# Patient Record
Sex: MALE | Race: WHITE | NOT HISPANIC OR LATINO | Employment: OTHER | URBAN - METROPOLITAN AREA
[De-identification: names, ages, dates, MRNs, and addresses within clinical notes are randomized per-mention and may not be internally consistent; named-entity substitution may affect disease eponyms.]

---

## 2017-01-05 ENCOUNTER — TRANSCRIBE ORDERS (OUTPATIENT)
Dept: ADMINISTRATIVE | Facility: HOSPITAL | Age: 62
End: 2017-01-05

## 2017-01-05 ENCOUNTER — ALLSCRIPTS OFFICE VISIT (OUTPATIENT)
Dept: OTHER | Facility: OTHER | Age: 62
End: 2017-01-05

## 2017-01-05 DIAGNOSIS — J44.9 OBSTRUCTIVE CHRONIC BRONCHITIS WITHOUT EXACERBATION (HCC): Primary | ICD-10-CM

## 2017-01-17 ENCOUNTER — ALLSCRIPTS OFFICE VISIT (OUTPATIENT)
Dept: OTHER | Facility: OTHER | Age: 62
End: 2017-01-17

## 2017-02-01 ENCOUNTER — ALLSCRIPTS OFFICE VISIT (OUTPATIENT)
Dept: OTHER | Facility: OTHER | Age: 62
End: 2017-02-01

## 2017-02-02 ENCOUNTER — ALLSCRIPTS OFFICE VISIT (OUTPATIENT)
Dept: OTHER | Facility: OTHER | Age: 62
End: 2017-02-02

## 2017-02-02 DIAGNOSIS — R73.01 IMPAIRED FASTING GLUCOSE: ICD-10-CM

## 2017-02-02 DIAGNOSIS — Z12.5 ENCOUNTER FOR SCREENING FOR MALIGNANT NEOPLASM OF PROSTATE: ICD-10-CM

## 2017-02-02 DIAGNOSIS — J44.1 CHRONIC OBSTRUCTIVE PULMONARY DISEASE WITH ACUTE EXACERBATION (HCC): ICD-10-CM

## 2017-02-02 DIAGNOSIS — E55.9 VITAMIN D DEFICIENCY: ICD-10-CM

## 2017-02-02 DIAGNOSIS — M81.0 AGE-RELATED OSTEOPOROSIS WITHOUT CURRENT PATHOLOGICAL FRACTURE: ICD-10-CM

## 2017-03-08 ENCOUNTER — HOSPITAL ENCOUNTER (OUTPATIENT)
Dept: RADIOLOGY | Facility: HOSPITAL | Age: 62
Discharge: HOME | End: 2017-03-08
Attending: INTERNAL MEDICINE
Payer: COMMERCIAL

## 2017-03-08 DIAGNOSIS — J44.9 OBSTRUCTIVE CHRONIC BRONCHITIS WITHOUT EXACERBATION (HCC): ICD-10-CM

## 2017-03-08 DIAGNOSIS — J44.9 CHRONIC OBSTRUCTIVE PULMONARY DISEASE (HCC): ICD-10-CM

## 2017-05-03 ENCOUNTER — ALLSCRIPTS OFFICE VISIT (OUTPATIENT)
Dept: OTHER | Facility: OTHER | Age: 62
End: 2017-05-03

## 2017-08-15 ENCOUNTER — ALLSCRIPTS OFFICE VISIT (OUTPATIENT)
Dept: OTHER | Facility: OTHER | Age: 62
End: 2017-08-15

## 2017-08-15 ENCOUNTER — TRANSCRIBE ORDERS (OUTPATIENT)
Dept: ADMINISTRATIVE | Facility: HOSPITAL | Age: 62
End: 2017-08-15

## 2017-08-15 DIAGNOSIS — I42.0 CONGESTIVE CARDIOMYOPATHY (HCC): Primary | ICD-10-CM

## 2017-08-15 DIAGNOSIS — I50.32 CHRONIC DIASTOLIC HEART FAILURE (HCC): ICD-10-CM

## 2017-08-21 ENCOUNTER — APPOINTMENT (OUTPATIENT)
Dept: LAB | Facility: HOSPITAL | Age: 62
End: 2017-08-21
Payer: COMMERCIAL

## 2017-08-21 ENCOUNTER — TRANSCRIBE ORDERS (OUTPATIENT)
Dept: LAB | Facility: HOSPITAL | Age: 62
End: 2017-08-21

## 2017-08-21 DIAGNOSIS — E78.00 PURE HYPERCHOLESTEROLEMIA: ICD-10-CM

## 2017-08-21 DIAGNOSIS — E78.00 PURE HYPERCHOLESTEROLEMIA: Primary | ICD-10-CM

## 2017-08-21 DIAGNOSIS — J44.1 CHRONIC OBSTRUCTIVE PULMONARY DISEASE WITH ACUTE EXACERBATION (HCC): ICD-10-CM

## 2017-08-21 DIAGNOSIS — Z12.5 ENCOUNTER FOR SCREENING FOR MALIGNANT NEOPLASM OF PROSTATE: ICD-10-CM

## 2017-08-21 DIAGNOSIS — E55.9 VITAMIN D DEFICIENCY: ICD-10-CM

## 2017-08-21 DIAGNOSIS — R73.01 IMPAIRED FASTING GLUCOSE: ICD-10-CM

## 2017-08-21 LAB
25(OH)D3 SERPL-MCNC: 16 NG/ML (ref 30–100)
ALBUMIN SERPL BCP-MCNC: 3.7 G/DL (ref 3.5–5)
ALP SERPL-CCNC: 85 U/L (ref 46–116)
ALT SERPL W P-5'-P-CCNC: 17 U/L (ref 12–78)
ANION GAP SERPL CALCULATED.3IONS-SCNC: 7 MMOL/L (ref 4–13)
AST SERPL W P-5'-P-CCNC: 16 U/L (ref 5–45)
BILIRUB SERPL-MCNC: 0.5 MG/DL (ref 0.2–1)
BUN SERPL-MCNC: 17 MG/DL (ref 5–25)
CALCIUM SERPL-MCNC: 8.8 MG/DL (ref 8.3–10.1)
CHLORIDE SERPL-SCNC: 105 MMOL/L (ref 100–108)
CHOLEST SERPL-MCNC: 197 MG/DL (ref 50–200)
CO2 SERPL-SCNC: 29 MMOL/L (ref 21–32)
CREAT SERPL-MCNC: 1.09 MG/DL (ref 0.6–1.3)
ERYTHROCYTE [DISTWIDTH] IN BLOOD BY AUTOMATED COUNT: 13.7 % (ref 11.6–15.1)
EST. AVERAGE GLUCOSE BLD GHB EST-MCNC: 108 MG/DL
GFR SERPL CREATININE-BSD FRML MDRD: 73 ML/MIN/1.73SQ M
GLUCOSE P FAST SERPL-MCNC: 96 MG/DL (ref 65–99)
HBA1C MFR BLD: 5.4 % (ref 4.2–6.3)
HCT VFR BLD AUTO: 42 % (ref 36.5–49.3)
HDLC SERPL-MCNC: 43 MG/DL (ref 40–60)
HGB BLD-MCNC: 14.1 G/DL (ref 12–17)
LDLC SERPL CALC-MCNC: 138 MG/DL (ref 0–100)
MCH RBC QN AUTO: 32.9 PG (ref 26.8–34.3)
MCHC RBC AUTO-ENTMCNC: 33.6 G/DL (ref 31.4–37.4)
MCV RBC AUTO: 98 FL (ref 82–98)
PLATELET # BLD AUTO: 235 THOUSANDS/UL (ref 149–390)
PMV BLD AUTO: 9.9 FL (ref 8.9–12.7)
POTASSIUM SERPL-SCNC: 4.2 MMOL/L (ref 3.5–5.3)
PROT SERPL-MCNC: 7.1 G/DL (ref 6.4–8.2)
PSA SERPL-MCNC: 1.8 NG/ML (ref 0–4)
RBC # BLD AUTO: 4.29 MILLION/UL (ref 3.88–5.62)
SODIUM SERPL-SCNC: 141 MMOL/L (ref 136–145)
TRIGL SERPL-MCNC: 80 MG/DL
WBC # BLD AUTO: 6.87 THOUSAND/UL (ref 4.31–10.16)

## 2017-08-21 PROCEDURE — 80061 LIPID PANEL: CPT

## 2017-08-21 PROCEDURE — 80053 COMPREHEN METABOLIC PANEL: CPT

## 2017-08-21 PROCEDURE — 83036 HEMOGLOBIN GLYCOSYLATED A1C: CPT

## 2017-08-21 PROCEDURE — G0103 PSA SCREENING: HCPCS

## 2017-08-21 PROCEDURE — 36415 COLL VENOUS BLD VENIPUNCTURE: CPT

## 2017-08-21 PROCEDURE — 85027 COMPLETE CBC AUTOMATED: CPT

## 2017-08-21 PROCEDURE — 82306 VITAMIN D 25 HYDROXY: CPT

## 2017-08-22 ENCOUNTER — HOSPITAL ENCOUNTER (OUTPATIENT)
Dept: NON INVASIVE DIAGNOSTICS | Facility: CLINIC | Age: 62
Discharge: HOME | End: 2017-08-22
Payer: COMMERCIAL

## 2017-08-22 ENCOUNTER — ALLSCRIPTS OFFICE VISIT (OUTPATIENT)
Dept: OTHER | Facility: OTHER | Age: 62
End: 2017-08-22

## 2017-08-22 DIAGNOSIS — I42.0 CONGESTIVE CARDIOMYOPATHY (HCC): ICD-10-CM

## 2017-08-22 DIAGNOSIS — E78.00 PURE HYPERCHOLESTEROLEMIA: ICD-10-CM

## 2017-08-22 DIAGNOSIS — I50.32 CHRONIC DIASTOLIC HEART FAILURE (HCC): ICD-10-CM

## 2017-08-22 PROCEDURE — 93306 TTE W/DOPPLER COMPLETE: CPT

## 2017-09-11 ENCOUNTER — ALLSCRIPTS OFFICE VISIT (OUTPATIENT)
Dept: OTHER | Facility: OTHER | Age: 62
End: 2017-09-11

## 2017-09-15 ENCOUNTER — GENERIC CONVERSION - ENCOUNTER (OUTPATIENT)
Dept: OTHER | Facility: OTHER | Age: 62
End: 2017-09-15

## 2017-10-26 NOTE — PROGRESS NOTES
Assessment  1  Chronic obstructive pulmonary disease (496) (J44 9)   2  Obstructive sleep apnea (327 23) (G47 33)    Plan  Chronic obstructive pulmonary disease    · Ventolin  (90 Base) MCG/ACT Inhalation Aerosol Solution; Inhale two  puffs every 4 hours as needed   Rx By: Pilar Mcconnell; Dispense: 30 Days ; #:2 X 18 GM Inhaler; Refill: 11; For: Chronic obstructive pulmonary disease; VANESSA = N; [facility]: [facility]  · Pulse Ox Exercise - POC; Status:Complete;   Done: 00NDV1526   Perform: In Office; Due:11Sep2018; Ordered; For:Chronic obstructive pulmonary disease; Ordered By:Malia Luque;   · Fluzone High-Dose 0 5 ML Intramuscular Suspension Prefilled Syringe;  INJECT 0 5  ML Intramuscular; To Be Done: [id number]   For: Chronic obstructive pulmonary disease; Ordered By:Malia Luque; Effective Date:11Sep2017    Results/Data  Results Free Text Form [facility]:   Results   Other The patient performed a 6 minute walk in the office today  Starting pulse 75 with also 97% on room air  He walked a total of 351 m in 6 minutes without stopping  At completion pulse was 104 and oxygen saturation was 92% on room air  Discussion/Summary  Discussion Summary: At this point I do not believe the patient would benefit from the addition of Zithromax as he does not have recurrent exacerbations  I think his shortness of breath is slightly worse than his pulmonary function tests because of his underlying cardiomyopathy  He does appear to be euvolemic on today's exam  He should remain on Breo, Spiriva and prednisone  He does not qualify for supplemental oxygen  will give him a flu shot todayencouraged him to remain as active as possible  will see him back in 6 months or sooner if pulmonary complications arise  Goals and Barriers: The patient has the current Goals: Improved exercise tolerance  The patent has the current Barriers: None  Patient's Capacity to Self-Care: Patient is able to Self-Care     Medication SE Review and Pt Understands Tx: The treatment plan was reviewed with the patient/guardian  The patient/guardian understands and agrees with the treatment plan      Active Problems  1  Atherosclerotic heart disease of native coronary artery without angina pectoris (414 01)   (I25 10)   2  Cardiomyopathy, dilated (425 4) (I42 0)   3  Chronic obstructive pulmonary disease (496) (J44 9)   4  Chronic systolic congestive heart failure (428 22,428 0) (I50 22)   5  Coronary artery disease (414 00) (I25 10)   6  Gastroesophageal reflux disease (530 81) (K21 9)   7  Hypercholesterolemia (272 0) (E78 00)   8  Impaired fasting glucose (790 21) (R73 01)   9  Left bundle-branch block (426 3) (I44 7)   10  Mood disorder (296 90) (F39)   11  Obstructive sleep apnea (327 23) (G47 33)   12  Osteoarthritis (715 90) (M19 90)   13  Osteoporosis (733 00) (M81 0)   14  Urinary frequency (788 41) (R35 0)   15  Vitamin D deficiency (268 9) (E55 9)    Chief Complaint  Chief Complaint Chronic Condition [facility]: Patient is here today for follow up of chronic conditions described in HPI  History of Present Illness  HPI: Overall the patient states there is no significant change in his symptoms since her last visit  He continues to have shortness of breath with exertion  He had to stop attempting to push the lawnmower yesterday because of the shortness of breath  He denies any associated chest pain or wheezing  Every once in a while he'll have a cough productive of phlegm  He denies any lower extremity edema  He remains on Spiriva once daily, Breo once daily, prednisone 20 mg daily and a Ventolin rescue inhaler  Past Medical History  1  History of Cardiomyopathy (425 4) (I42 9)   2  History of COPD exacerbation (491 21) (J44 1)   3  History of acute bronchitis (V12 69) (Z87 09)   4  History of chest pain (V13 89) (Z87 898)   5  History of left bundle branch block (V12 59) (Z86 79)   6  History of multiple pulmonary nodules (V12 69) (Z87 898)   7  History of nicotine dependence (V15 82) (Z87 891)   8  History of Hypoxia (799 02) (R09 02)   9  History of Nonspecific abnormal findings on radiological and examination of lung field   (793 19) (R91 8)   10  History of PPD screening test (V74 1) (Z11 1)   11  History of Pulmonary nodule seen on imaging study (793 11) (R91 1)    Surgical History  1  History of Cardiac Cath Procedure Summary   2  Denied: History of Surgery  Surgical History Reviewed: The surgical history was reviewed and updated today  Family History  Mother    1  Family history of Diabetes Mellitus (V18 0)  Family History Reviewed: The family history was reviewed and updated today  Social History   · Daily Coffee Consumption - Eight Or More Cups A Day   · Former smoker (Q52 02) (R53 844)   · He smokes one pack per day of cigarettes for 37 years  Since July 2010 he has been      down to 5 cigarettes a day  Quit smoking last year 2012   is around second hand smoke   · Marital History - Currently    · Rarely consumes alcohol (V49 89) (Z78 9)  Social History Reviewed: The social history was reviewed and updated today  The social history was reviewed and is unchanged  Current Meds   1  Albuterol Sulfate (2 5 MG/3ML) 0 083% Inhalation Nebulization Solution; USE 1 UNIT   DOSE IN NEBULIZER 4 TIMES DAILY; Therapy: 84YVY1603 to (Evaluate:09Jun2017)  Requested for: 62DWB8071; Last   Rx:14Jun2016 Ordered   2  Aspirin 81 MG TABS; TAKE 1 TABLET DAILY; Therapy: 92FBH0480 to (Evaluate:10Jan2015) Recorded   3  Breo Ellipta 200-25 MCG/INH Inhalation Aerosol Powder Breath Activated; INHALE 1   PUFFS Daily; Therapy: 45CLS4912 to (Evaluate:08Ylb4683)  Requested for: 18QGT4070; Last   Rx:05Jan2017 Ordered   4  Pantoprazole Sodium 40 MG Oral Tablet Delayed Release; TAKE 1 TABLET Daily 30   minutes before dinner; Therapy: 02KAU0038 to (Evaluate:68Bht7582)  Requested for: 61VZA6762; Last   Rx:05Jan2017 Ordered   5   PredniSONE 10 MG Oral Tablet; take 2 tablet daily; Therapy: 88IEY5508 to (Evaluate:78Jnh8486)  Requested for: 41Dly2381; Last   Rx:57Kzd6366 Ordered   6  Rosuvastatin Calcium 10 MG Oral Tablet; take 1 tablet every day; Therapy: 34Wzk4588 to (Evaluate:79Iex5726)  Requested for: 35Hgs4824; Last   Rx:58Mdi1409 Ordered   7  Spiriva Respimat 2 5 MCG/ACT Inhalation Aerosol Solution; INHALE 2 PUFFS ONCE   DAILY; Therapy: 39Lol7865 to (Evaluate:60Qut4136)  Requested for: 25CFO0424; Last   Rx:05Jan2017 Ordered   8  Ventolin  (90 Base) MCG/ACT Inhalation Aerosol Solution; Inhale two puffs every   4 hours as needed; Therapy: 87CXT9644 to (Evaluate:25Jun2018)  Requested for: 72CDG4320; Last   Rx:30Jun2017 Ordered   9  Vitamin D (Ergocalciferol) 51354 UNIT Oral Capsule; take one capsule twice weekly for 6   weeks; Therapy: 16Pvc3220 to ([address])  Requested for: 15Cvo8080; Last   Rx:71Ssk9294 Ordered   10  Vitamin D CAPS; Therapy: ([place]) to Recorded  Medication List Reviewed: The medication list was reviewed and updated today  Allergies  1  No Known Drug Allergies    Vitals  Vital Signs    Recorded: 11Sep2017 09:49AM   Temperature 98 2 F   Heart Rate 78   Respiration 14   Systolic 767   Diastolic 76   Height 5 ft 2 in   Weight 142 lb    BMI Calculated 25 97   BSA Calculated 1 65   O2 Saturation 96, RA     Physical Exam    Constitutional   General appearance: No acute distress, well appearing and well nourished  Ears, Nose, Mouth, and Throat   Nasal mucosa, septum, and turbinates: Normal without edema or erythema  Lips, teeth, and gums: Normal, good dentition  Oropharynx: Normal with no erythema, edema, exudate or lesions  Neck   Neck: Supple, symmetric, trachea midline, no masses  Jugular veins: Normal     Pulmonary   Chest: Normal     Respiratory effort: No increased work of breathing or signs of respiratory distress      Auscultation of lungs: Abnormal  -- Decreased breath sounds bilateral without wheeze  Cardiovascular   Auscultation of heart: Normal rate and rhythm, normal S1 and S2, no murmurs  Examination of extremities for edema and/or varicosities: Normal     Abdomen   Abdomen: Soft, non-tender  Lymphatic   Palpation of lymph nodes in neck: No lymphadenopathy  Musculoskeletal   Gait and station: Normal     Digits and nails: Normal without clubbing or cyanosis  Neurologic   Mental Status: Normal  Not confused, no evidence of dementia, good comprehension, good concentration  Skin   Skin and subcutaneous tissue: Limited exam shows no rash      Psychiatric   Orientation to person, place and time: Normal     Mood and affect: Normal        Future Appointments    Date/Time Provider Specialty Site   02/22/2018 10:45 AM Alex Pompa DO Internal Medicine [facility] INTERNAL MED     Signatures   Electronically signed by : Jonathan Goodman DO; Sep 11 2017 10:48AM EST                       (Author)    Electronically signed by : Jonathan Goodman DO; Sep 11 2017 10:49AM EST                       (Author)

## 2018-01-11 NOTE — PROGRESS NOTES
Chief Complaint  Patient visit today for PPD left forearm 1 ml lot #[id number] exp 12/5/17      Active Problems    1  Atherosclerotic heart disease of native coronary artery without angina pectoris (414 01)   (I25 10)   2  Cardiomyopathy (425 4) (I42 9)   3  Chronic obstructive pulmonary disease (496) (J44 9)   4  Chronic respiratory failure (518 83) (J96 10)   5  Chronic systolic congestive heart failure (428 22,428 0) (I50 22)   6  Colonoscopy (Fiberoptic) Screening   7  Dilated cardiomyopathy (425 4) (I42 0)   8  Encounter for prostate cancer screening (V76 44) (Z12 5)   9  Encounter for screening for osteoporosis (V82 81) (Z13 820)   10  Hypercholesterolemia (272 0) (E78 0)   11  Impaired fasting glucose (790 21) (R73 01)   12  Left bundle branch block (426 3) (I44 7)   13  Mood disorder (296 90) (F39)   14  Multiple lung nodules (793 19) (R91 8)   15  Need for immunization against influenza (V04 81) (Z23)   16  Obstructive sleep apnea (327 23) (G47 33)   17  Osteoarthritis (715 90) (M19 90)   18  Osteoporosis (733 00) (M81 0)   19  PPD screening test (V74 1) (Z11 1)   20  Urinary frequency (788 41) (R35 0)   21  Vitamin D deficiency (268 9) (E55 9)    Current Meds   1  Aspirin 81 MG Oral Tablet; TAKE 1 TABLET DAILY; Therapy: 63VBV3124 to (Evaluate:10Jan2015) Recorded   2  Azithromycin 250 MG Oral Tablet; TAKE 1 TABLET DAILY AS DIRECTED; Therapy: 72TQD7474 to (Evaluate:14Jun2016)  Requested for: 70XND5777; Last   Rx:51Dtt8408 Ordered   3  Breo Ellipta 100-25 MCG/INH Inhalation Aerosol Powder Breath Activated; USE 1   INHALATION ONCE DAILY; Therapy: 09IEH9588 to ([employer])  Requested for: 62HKO2130; Last   Rx:94Jfm7564 Ordered   4  PredniSONE 10 MG Oral Tablet; take 2 tablet daily; Therapy: 02LKU2107 to (Evaluate:15Ebg1721)  Requested for: 70Xia7379; Last   Rx:70Gqu6949 Ordered   5  Simvastatin 20 MG Oral Tablet; take 2 tablet daily;    Therapy: 20ZQS3418 to (Evaluate:52Xfr7425)  Requested for: 00TVI8995; Last   Rx:41Aev0326 Ordered   6  Spiriva Respimat 2 5 MCG/ACT Inhalation Aerosol Solution; INHALE 2 PUFFS ONCE   DAILY; Therapy: 11Aug2015 to (Evaluate:74Lwg4427)  Requested for: 11Aug2015; Last   Rx:11Aug2015 Ordered   7  Symbicort 160-4 5 MCG/ACT Inhalation Aerosol; INHALE 2 PUFFS TWICE DAILY  RINSE MOUTH AFTER USE; Therapy: 34PGS4247 to (Evaluate:02Bxn6755)  Requested for: 33Cdq5875; Last   Rx:22Pzi6598 Ordered   8  Ventolin  (90 Base) MCG/ACT Inhalation Aerosol Solution; Inhale two puffs every   4 hours as needed; Therapy: 01CFH3018 to (Evaluate:32Zrr3149)  Requested for: 10Gow7961; Last   Rx:15Htg8587 Ordered   9  Vitamin D (Ergocalciferol) 77339 UNIT Oral Capsule; TAKE 1 CAPSULE Weekly; Therapy: (Recorded:34Xkm5992) to Recorded    Allergies    1  No Known Drug Allergies    Assessment    1   PPD screening test (V74 1) (Z11 1)    Plan  PPD screening test    · PPD    Future Appointments    Date/Time Provider Specialty Site   02/18/2016 08:00 AM Benjamín Lopez DO Internal Medicine [facility] INTERNAL MED     Signatures   Electronically signed by : Nigel Madden DO; Jan 12 2016  9:25AM EST

## 2018-01-12 VITALS
OXYGEN SATURATION: 97 % | WEIGHT: 138.5 LBS | TEMPERATURE: 97.8 F | HEIGHT: 62 IN | RESPIRATION RATE: 16 BRPM | BODY MASS INDEX: 25.49 KG/M2 | DIASTOLIC BLOOD PRESSURE: 64 MMHG | SYSTOLIC BLOOD PRESSURE: 102 MMHG | HEART RATE: 88 BPM

## 2018-01-12 VITALS
RESPIRATION RATE: 16 BRPM | HEIGHT: 62 IN | TEMPERATURE: 98.3 F | DIASTOLIC BLOOD PRESSURE: 70 MMHG | BODY MASS INDEX: 25.95 KG/M2 | WEIGHT: 141 LBS | OXYGEN SATURATION: 92 % | HEART RATE: 85 BPM | SYSTOLIC BLOOD PRESSURE: 110 MMHG

## 2018-01-12 VITALS
DIASTOLIC BLOOD PRESSURE: 60 MMHG | RESPIRATION RATE: 18 BRPM | TEMPERATURE: 97.5 F | BODY MASS INDEX: 24.84 KG/M2 | WEIGHT: 135 LBS | OXYGEN SATURATION: 91 % | SYSTOLIC BLOOD PRESSURE: 112 MMHG | HEART RATE: 87 BPM | HEIGHT: 62 IN

## 2018-01-13 VITALS
SYSTOLIC BLOOD PRESSURE: 104 MMHG | HEIGHT: 62 IN | RESPIRATION RATE: 16 BRPM | BODY MASS INDEX: 25.98 KG/M2 | DIASTOLIC BLOOD PRESSURE: 60 MMHG | WEIGHT: 141.19 LBS | HEART RATE: 72 BPM

## 2018-01-13 VITALS
TEMPERATURE: 98.2 F | HEART RATE: 78 BPM | OXYGEN SATURATION: 96 % | RESPIRATION RATE: 14 BRPM | SYSTOLIC BLOOD PRESSURE: 108 MMHG | BODY MASS INDEX: 26.13 KG/M2 | HEIGHT: 62 IN | DIASTOLIC BLOOD PRESSURE: 76 MMHG | WEIGHT: 142 LBS

## 2018-01-13 VITALS
OXYGEN SATURATION: 94 % | DIASTOLIC BLOOD PRESSURE: 60 MMHG | WEIGHT: 135.13 LBS | SYSTOLIC BLOOD PRESSURE: 110 MMHG | BODY MASS INDEX: 24.87 KG/M2 | TEMPERATURE: 97.3 F | RESPIRATION RATE: 16 BRPM | HEART RATE: 83 BPM | HEIGHT: 62 IN

## 2018-01-14 VITALS
SYSTOLIC BLOOD PRESSURE: 110 MMHG | WEIGHT: 133 LBS | HEIGHT: 62 IN | BODY MASS INDEX: 24.48 KG/M2 | OXYGEN SATURATION: 91 % | HEART RATE: 81 BPM | DIASTOLIC BLOOD PRESSURE: 64 MMHG

## 2018-01-16 NOTE — PROGRESS NOTES
Chief Complaint  Patient seen in office for PPD for work  Scheduled to return in 3 days to be read  Active Problems    1  Atherosclerotic heart disease of native coronary artery without angina pectoris (414 01)   (I25 10)   2  Cardiomyopathy (425 4) (I42 9)   3  Chronic obstructive pulmonary disease (496) (J44 9)   4  Chronic systolic congestive heart failure (428 22,428 0) (I50 22)   5  Colonoscopy (Fiberoptic) Screening   6  Coronary artery disease (414 00) (I25 10)   7  Dilated cardiomyopathy (425 4) (I42 0)   8  Encounter for prostate cancer screening (V76 44) (Z12 5)   9  Encounter for screening for osteoporosis (V82 81) (Z13 820)   10  Gastroesophageal reflux disease (530 81) (K21 9)   11  Hypercholesterolemia (272 0) (E78 00)   12  Impaired fasting glucose (790 21) (R73 01)   13  Left bundle-branch block (426 3) (I44 7)   14  Mood disorder (296 90) (F39)   15  Need for immunization against influenza (V04 81) (Z23)   16  Obstructive sleep apnea (327 23) (G47 33)   17  Osteoarthritis (715 90) (M19 90)   18  Osteoporosis (733 00) (M81 0)   19  PPD screening test (V74 1) (Z11 1)   20  Urinary frequency (788 41) (R35 0)   21  Vitamin D deficiency (268 9) (E55 9)    Current Meds   1  Albuterol Sulfate (2 5 MG/3ML) 0 083% Inhalation Nebulization Solution; USE 1 UNIT   DOSE IN NEBULIZER 4 TIMES DAILY; Therapy: 35GNN9193 to (Evaluate:09Jun2017)  Requested for: 23GRE3718; Last   Rx:14Jun2016 Ordered   2  Aspirin 81 MG TABS; TAKE 1 TABLET DAILY; Therapy: 57QAQ3521 to (Evaluate:10Jan2015) Recorded   3  Breo Ellipta 200-25 MCG/INH Inhalation Aerosol Powder Breath Activated; INHALE 1   PUFFS Daily; Therapy: 69RMV2736 to (Evaluate:68Nrw4872)  Requested for: 58FAK1541; Last   Rx:05Jan2017 Ordered   4  Pantoprazole Sodium 40 MG Oral Tablet Delayed Release; TAKE 1 TABLET Daily 30   minutes before dinner; Therapy: 85UPM8272 to (Evaluate:90Hkq3292)  Requested for: 85GYB3091; Last   Rx:05Jan2017 Ordered   5  PredniSONE 10 MG Oral Tablet; take 2 tablet daily; Therapy: 87IEA2933 to (Evaluate:49Euc2059)  Requested for: 29Iba7712; Last   Rx:30Pjo3594 Ordered   6  Simvastatin 20 MG Oral Tablet; take 2 tablet daily; Therapy: 12JNU4344 to (Evaluate:63Tsf0163)  Requested for: 16MEA9956; Last   Rx:04Hgg3308 Ordered   7  Spiriva Respimat 2 5 MCG/ACT Inhalation Aerosol Solution; INHALE 2 PUFFS ONCE   DAILY; Therapy: 76Qbm8170 to (Evaluate:07Svv4662)  Requested for: 80SAP1728; Last   Rx:05Jan2017 Ordered   8  Ventolin  (90 Base) MCG/ACT Inhalation Aerosol Solution; Inhale two puffs every   4 hours as needed; Therapy: 10OMH0979 to (Evaluate:32Tgz0568)  Requested for: 00Fkk5733; Last   Rx:62Nyf7795 Ordered    Allergies    1  No Known Drug Allergies    Assessment    1  PPD screening test (V74 1) (Z11 1)    Plan  PPD screening test    · Tubersol 5 UNIT/0 1ML Intradermal Solution    Future Appointments    Date/Time Provider Specialty Site   02/01/2017 04:40 PM KRIS Medina   Cardiology [facility] CARDIOLOGY [place]   02/02/2017 11:15 AM Mae Spann DO Internal Medicine [facility] INTERNAL MED   05/03/2017 09:20 AM Leodan Moreno DO Pulmonary Medicine [place] [address]     Signatures   Electronically signed by : Magaly Angelo DO; Jan 17 2017 10:16AM EST

## 2018-02-07 ENCOUNTER — OFFICE VISIT (OUTPATIENT)
Dept: CARDIOLOGY CLINIC | Facility: CLINIC | Age: 63
End: 2018-02-07
Payer: COMMERCIAL

## 2018-02-07 VITALS — HEART RATE: 80 BPM | SYSTOLIC BLOOD PRESSURE: 130 MMHG | DIASTOLIC BLOOD PRESSURE: 70 MMHG | OXYGEN SATURATION: 95 %

## 2018-02-07 DIAGNOSIS — J96.11 CHRONIC RESPIRATORY FAILURE WITH HYPOXIA (HCC): ICD-10-CM

## 2018-02-07 DIAGNOSIS — I50.22 CHRONIC SYSTOLIC CONGESTIVE HEART FAILURE (HCC): ICD-10-CM

## 2018-02-07 DIAGNOSIS — I25.10 CORONARY ARTERY DISEASE INVOLVING NATIVE CORONARY ARTERY OF NATIVE HEART WITHOUT ANGINA PECTORIS: ICD-10-CM

## 2018-02-07 DIAGNOSIS — J44.9 CHRONIC OBSTRUCTIVE PULMONARY DISEASE, UNSPECIFIED COPD TYPE (HCC): ICD-10-CM

## 2018-02-07 DIAGNOSIS — I44.7 LEFT BUNDLE-BRANCH BLOCK: ICD-10-CM

## 2018-02-07 DIAGNOSIS — E78.00 HYPERCHOLESTEROLEMIA: ICD-10-CM

## 2018-02-07 DIAGNOSIS — G47.33 OBSTRUCTIVE SLEEP APNEA: ICD-10-CM

## 2018-02-07 DIAGNOSIS — I42.0 CARDIOMYOPATHY, DILATED (HCC): Primary | ICD-10-CM

## 2018-02-07 PROBLEM — K21.9 GASTROESOPHAGEAL REFLUX DISEASE: Status: ACTIVE | Noted: 2017-01-05

## 2018-02-07 PROBLEM — J96.10 CHRONIC RESPIRATORY FAILURE (HCC): Status: ACTIVE | Noted: 2018-02-07

## 2018-02-07 PROCEDURE — 99214 OFFICE O/P EST MOD 30 MIN: CPT | Performed by: INTERNAL MEDICINE

## 2018-02-07 RX ORDER — PREDNISONE 10 MG/1
2 TABLET ORAL DAILY
COMMUNITY
Start: 2012-02-11 | End: 2021-04-09

## 2018-02-07 RX ORDER — ROSUVASTATIN CALCIUM 10 MG/1
1 TABLET, COATED ORAL DAILY
COMMUNITY
Start: 2017-08-22 | End: 2018-02-26 | Stop reason: SDUPTHER

## 2018-02-07 RX ORDER — ALBUTEROL SULFATE 2.5 MG/3ML
1 SOLUTION RESPIRATORY (INHALATION) 4 TIMES DAILY
COMMUNITY
Start: 2012-07-18 | End: 2018-10-19 | Stop reason: SDUPTHER

## 2018-02-07 RX ORDER — PANTOPRAZOLE SODIUM 40 MG/1
TABLET, DELAYED RELEASE ORAL DAILY
COMMUNITY
Start: 2017-01-05 | End: 2021-05-24

## 2018-02-07 RX ORDER — FLUTICASONE FUROATE AND VILANTEROL 200; 25 UG/1; UG/1
1 POWDER RESPIRATORY (INHALATION) DAILY
COMMUNITY
Start: 2016-10-12 | End: 2018-03-15

## 2018-02-07 NOTE — PATIENT INSTRUCTIONS
Low-Sodium Diet   AMBULATORY CARE:   A low-sodium diet  limits foods that are high in sodium (salt)  You will need to follow a low-sodium diet if you have high blood pressure, kidney disease, or heart failure  You may also need to follow this diet if you have a condition that is causing your body to retain (hold) extra fluid  You may need to limit the amount of sodium you eat to 1,500 mg  Ask your healthcare provider how much sodium you can have each day  How to use food labels to choose foods that are low in sodium:  Read food labels to find the amount of sodium they contain  The amount of sodium is listed in milligrams (mg)  The % Daily Value (DV) column tells you how much of your daily needs are met by 1 serving of the food for each nutrient listed  Choose foods that have less than 5% of the DV of sodium  These foods are considered low in sodium  Foods that have 20% or more of the DV of sodium are considered high in sodium  Some food labels may also list any of the following terms that tell you about the sodium content in the food:  · Sodium-free:  Less than 5 mg in each serving    · Very low sodium:  35 mg of sodium or less in each serving    · Low sodium:  140 mg of sodium or less in each serving    · Reduced sodium: At least 25% less sodium in each serving than the regular type    · Light in sodium:  50% less sodium in each serving    · Unsalted or no added salt:  No extra salt is added during processing (the food may still contain sodium)  Foods to avoid:  Salty foods are high in sodium   You should avoid the following:  · Processed foods:      ¨ Mixes for cornbread, biscuits, cake, and pudding     ¨ Instant foods, such as potatoes, cereals, noodles, and rice     ¨ Packaged foods, such as bread stuffing, rice and pasta mixes, snack dip mixes, and macaroni and cheese     ¨ Canned foods, such as canned vegetables, soups, broths, sauces, and vegetable or tomato juice    ¨ Snack foods, such as salted chips, popcorn, pretzels, pork rinds, salted crackers, and salted nuts    ¨ Frozen foods, such as dinners, entrees, vegetables with sauces, and breaded meats    ¨ Sauerkraut, pickled vegetables, and other foods prepared in brine    · Meats and cheeses:      ¨ Smoked or cured meat, such as corned beef, jean, ham, hot dogs, and sausage    ¨ Canned meats or spreads, such as potted meats, sardines, anchovies, and imitation seafood    ¨ Deli or lunch meats, such as bologna, ham, turkey, and roast beef    ¨ Processed cheese, such as American cheese and cheese spreads    · Condiments, sauces, and seasonings:      ¨ Salt (¼ teaspoon of salt contains 575 mg of sodium)    ¨ Seasonings made with salt, such as garlic salt, celery salt, onion salt, and seasoned salt    ¨ Regular soy sauce, barbecue sauce, teriyaki sauce, steak sauce, Worcestershire sauce, and most flavored vinegars    ¨ Canned gravy and mixes     ¨ Regular condiments, such as mustard, ketchup, and salad dressings    ¨ Pickles and olives    ¨ Meat tenderizers and monosodium glutamate (MSG)  Foods to include:  Read the food label to find the exact amount of sodium in each serving  · Bread and cereal:  Try to choose breads with less than 80 mg of sodium per serving  ¨ Bread, roll, mario, tortilla, or unsalted crackers  ¨ Ready-to-eat cereals with less than 5% DV of sodium (examples include shredded wheat and puffed rice)    ¨ Pasta    · Vegetables and fruits:      ¨ Unsalted fresh, frozen, or canned vegetables    ¨ Fresh, frozen, or canned fruits    ¨ Fruit juice    · Dairy:  One serving has about 150 mg of sodium  ¨ Milk, all types    ¨ Yogurt    ¨ Hard cheese, such as cheddar, Swiss, [employer], or mozzarella    · Meat and other protein foods:  Some raw meats may have added sodium       ¨ Plain meats, fish, and poultry     ¨ Eggs    · Other foods:      ¨ Homemade pudding    ¨ Unsalted nuts, popcorn, or pretzels    ¨ Unsalted butter or margarine  Ways to decrease sodium:   · Add spices and herbs to foods instead of salt during cooking  Use salt-free seasonings to add flavor to foods  Examples include onion powder, garlic powder, basil, goodrich powder, paprika, and parsley  Try lemon or lime juice or vinegar to give foods a tart flavor  Use hot peppers, pepper, or cayenne pepper to add a spicy flavor to foods  · Do not keep a salt shaker at your kitchen table  This may help keep you from adding salt to food at the table  It may take time to get used to enjoying the natural flavor of food instead of adding salt  Talk to your healthcare provider before you use salt substitutes  Some salt substitutes have a high amount of potassium and need to be avoided if you have kidney disease  · Choose low-sodium foods at restaurants  Meals from restaurants are often high in sodium  Some restaurants have nutrition information on the menu that tells you the amount of sodium in their foods  If possible, ask for your food to be prepared with less, or no salt  · Shop for unsalted or low-sodium foods and snacks at the grocery store  Examples include unsalted or low-sodium broths, soups, and canned vegetables  Choose fresh or frozen vegetables instead  Choose unsalted nuts or seeds or fresh fruits or vegetables as snacks  Read food labels and choose salt-free, very low-sodium, or low-sodium foods  © 2017 [address] [employer] Information is for End User's use only and may not be sold, redistributed or otherwise used for commercial purposes  All illustrations and images included in CareNotes® are the copyrighted property of [employer]  or [employer]avan[employer]  The above information is an  only  It is not intended as medical advice for individual conditions or treatments  Talk to your doctor, nurse or pharmacist before following any medical regimen to see if it is safe and effective for you

## 2018-02-07 NOTE — PROGRESS NOTES
Cardiology Follow Up    Rosamaria Waters   4/30/1954  [id number]  [address] [facility]  [address] [place] [address] [address]    1  Cardiomyopathy, dilated ([facility] )     2  Chronic systolic congestive heart failure ([facility] )     3  Chronic obstructive pulmonary disease, unspecified COPD type ([facility] )     4  Chronic respiratory failure with hypoxia (HCC)     5  Coronary artery disease involving native coronary artery of native heart without angina pectoris     6  Left bundle-branch block     7  Hypercholesterolemia     8  Obstructive sleep apnea         Interval History:     Edward Mccoy returns for a 6 month followup given his history of a dilated cardiomyopathy and chronic systolic congestive heart failure, and chronic left bundle branch block  Over the few years, I've been seeing Edward Mccoy closely given his significant shortness of breath, along with his ejection fraction hovering around 40%  He has had, however, significant lung disease, and follows with Dr Jane Khalil of pulmonary  Most of his symptoms appeared to be pulmonary related, but given his cardiomyopathy and the symptoms we entertained the idea of a biventricular ICD, understanding that it may not provide any clinical benefit  I repeated an echocardiogram and it revealed an ejection fraction more in the 40-45% range and therefore, he did not qualify for this  He follows with pulmonary closely who has been titrating steroid therapy for his lung disease  He is on minimal medications from a cardiac standpoint  His cardiomyopathy medications he did not tolerate, as he became quite hypotensive  His last few echocardiograms have demonstrated ejection fraction of around 35 - 40%  Edward Mccoy overall feels about the same  He continues to have shortness of breath, but this has not changed  He denies any chest pain or any symptoms suggestive of angina  He denies any orthopnea or PND   He denies any changes in weight or any increasing lower extremity edema  He denies palpitations, or any syncopal events  From a volume standpoint he has always been compensated  He does ramirez with intermittent gastroesophageal reflux  Patient Active Problem List   Diagnosis    Coronary artery disease    Cardiomyopathy, dilated ([place] [address] )    Chronic obstructive pulmonary disease (HCC)    Chronic systolic congestive heart failure (HCC)    Left bundle-branch block    Obstructive sleep apnea    Hypercholesterolemia    Gastroesophageal reflux disease    Chronic respiratory failure ([facility])     Past Medical History:   Diagnosis Date    COPD (chronic obstructive pulmonary disease) ([facility])     Emphysema of lung ([place] [address] )     Smoker      Social History     Social History    Marital status: /Civil Union     Spouse name: N/A    Number of children: N/A    Years of education: N/A     Occupational History    Not on file  Social History Main Topics    Smoking status: Former Smoker    Smokeless tobacco: Former User    Alcohol use Not on file    Drug use: Unknown    Sexual activity: Not on file     Other Topics Concern    Not on file     Social History Narrative    No narrative on file      No family history on file  No past surgical history on file      Current Outpatient Prescriptions:     albuterol (2 5 mg/3 mL) 0 083 % nebulizer solution, Inhale 1 each 4 (four) times a day, Disp: , Rfl:     aspirin 81 MG tablet, Take 1 tablet by mouth daily, Disp: , Rfl:     fluticasone furoate-vilanterol (BREO ELLIPTA) 200-25 MCG/INH inhaler, Inhale 1 puff daily, Disp: , Rfl:     pantoprazole (PROTONIX) 40 mg tablet, Take by mouth Daily, Disp: , Rfl:     predniSONE 10 mg tablet, Take 2 tablets by mouth daily, Disp: , Rfl:     rosuvastatin (CRESTOR) 10 MG tablet, Take 1 tablet by mouth daily, Disp: , Rfl:     Tiotropium Bromide Monohydrate (SPIRIVA RESPIMAT) 2 5 MCG/ACT AERS, Inhale 2 puffs daily, Disp: , Rfl:   No Known Allergies    Labs:  Lab Results   Component Value Date     08/21/2017     08/17/2015    K 4 2 08/21/2017    K 3 9 08/17/2015     08/21/2017     (H) 08/17/2015    CO2 29 08/21/2017    CO2 30 08/17/2015    BUN 17 08/21/2017    BUN 13 08/17/2015    CREATININE 1 09 08/21/2017    CREATININE 1 04 08/17/2015    GLUCOSE 81 08/15/2016    GLUCOSE 90 08/17/2015    CALCIUM 8 8 08/21/2017    CALCIUM 8 8 08/17/2015     Imaging:    ECHO (8/17):    SUMMARY     LEFT VENTRICLE:  Systolic function was moderately reduced  Ejection fraction was estimated to be 35 %  There was moderate diffuse hypokinesis with regional variations  Doppler parameters were consistent with abnormal left ventricular relaxation (grade 1 diastolic dysfunction)      VENTRICULAR SEPTUM:  There was dyssynergic motion  These changes are consistent with LBBB  Review of Systems:  Review of Systems   Constitutional: Positive for fatigue  HENT: Negative  Eyes: Negative  Respiratory: Positive for shortness of breath  Cardiovascular: Negative  Gastrointestinal: Negative  Musculoskeletal: Negative  Skin: Negative  Allergic/Immunologic: Negative  Neurological: Negative  Hematological: Negative  Psychiatric/Behavioral: Negative  All other systems reviewed and are negative  Physical Exam:  Physical Exam   Constitutional: He is oriented to person, place, and time  He appears well-developed and well-nourished  HENT:   Head: Normocephalic and atraumatic  Eyes: EOM are normal  Pupils are equal, round, and reactive to light  Right eye exhibits no discharge  Left eye exhibits no discharge  No scleral icterus  Neck: Normal range of motion  Neck supple  No JVD present  No thyromegaly present  Cardiovascular: Normal rate, regular rhythm, S1 normal, S2 normal, intact distal pulses and normal pulses  No extrasystoles are present  Exam reveals distant heart sounds   Exam reveals no gallop and no friction rub     No murmur heard  Pulmonary/Chest: Effort normal  No respiratory distress  He has decreased breath sounds  He has no wheezes  He has no rales  Abdominal: Soft  Bowel sounds are normal  He exhibits no distension  There is no tenderness  Musculoskeletal: Normal range of motion  He exhibits no edema, tenderness or deformity  Neurological: He is alert and oriented to person, place, and time  No cranial nerve deficit  Skin: Skin is warm and dry  No rash noted  Psychiatric: He has a normal mood and affect  Judgment and thought content normal    Nursing note and vitals reviewed  Discussion/Summary:    1  Chronic respiratory failure -  This appears to be more pulmonary related, but at least has a combination of both COPD and systolic CHF  He  Is currently not requiring oxygen, but does wear CPAP at night for sleep apnea  He will continue to follow with Pulmonary closely  2    Chronic systolic CHF -  This is secondary to a dilated cardiomyopathy and a left bundle branch block  Ejection fractions appear to range between 35 and 40%  We have had the discussion of a biventricular ICD, which I do feel would provide little if any benefit  Also his ejection fractions have been over 35% on a few occasions  No changes were made to his medical therapy  He should continue to watch his sodium intake and weight, for any signs of volume overload  We will see him back in 6 months  3  CAD -   Nonobstructive by cardiac catheterization  Continue medical management  Patient on simvastatin for dyslipidemia

## 2018-02-19 ENCOUNTER — APPOINTMENT (OUTPATIENT)
Dept: LAB | Facility: HOSPITAL | Age: 63
End: 2018-02-19
Payer: COMMERCIAL

## 2018-02-19 DIAGNOSIS — E78.00 PURE HYPERCHOLESTEROLEMIA: ICD-10-CM

## 2018-02-19 LAB
CHOLEST SERPL-MCNC: 122 MG/DL (ref 50–200)
HDLC SERPL-MCNC: 44 MG/DL (ref 40–60)
LDLC SERPL CALC-MCNC: 64 MG/DL (ref 0–100)
TRIGL SERPL-MCNC: 72 MG/DL

## 2018-02-19 PROCEDURE — 80061 LIPID PANEL: CPT

## 2018-02-19 PROCEDURE — 36415 COLL VENOUS BLD VENIPUNCTURE: CPT

## 2018-02-22 ENCOUNTER — OFFICE VISIT (OUTPATIENT)
Dept: INTERNAL MEDICINE CLINIC | Facility: CLINIC | Age: 63
End: 2018-02-22
Payer: COMMERCIAL

## 2018-02-22 VITALS
OXYGEN SATURATION: 90 % | TEMPERATURE: 96.1 F | BODY MASS INDEX: 23.99 KG/M2 | HEART RATE: 79 BPM | SYSTOLIC BLOOD PRESSURE: 110 MMHG | DIASTOLIC BLOOD PRESSURE: 70 MMHG | WEIGHT: 144 LBS | RESPIRATION RATE: 16 BRPM | HEIGHT: 65 IN

## 2018-02-22 DIAGNOSIS — K21.9 GASTROESOPHAGEAL REFLUX DISEASE WITHOUT ESOPHAGITIS: ICD-10-CM

## 2018-02-22 DIAGNOSIS — G47.33 OBSTRUCTIVE SLEEP APNEA: ICD-10-CM

## 2018-02-22 DIAGNOSIS — J44.9 CHRONIC OBSTRUCTIVE PULMONARY DISEASE, UNSPECIFIED COPD TYPE (HCC): Primary | ICD-10-CM

## 2018-02-22 DIAGNOSIS — E78.00 HYPERCHOLESTEROLEMIA: ICD-10-CM

## 2018-02-22 PROCEDURE — 99214 OFFICE O/P EST MOD 30 MIN: CPT | Performed by: INTERNAL MEDICINE

## 2018-02-22 RX ORDER — ALBUTEROL SULFATE 90 UG/1
AEROSOL, METERED RESPIRATORY (INHALATION)
COMMUNITY
Start: 2013-02-13 | End: 2018-03-15

## 2018-02-22 RX ORDER — MULTIVIT-MIN/IRON/FOLIC ACID/K 18-600-40
CAPSULE ORAL
Status: ON HOLD | COMMUNITY
End: 2020-01-03

## 2018-02-22 NOTE — ASSESSMENT & PLAN NOTE
Advised avoiding ulcergenic foods  [facility], can also use H2B for breakthrough symptoms and instructed to use pantoprazole for short courses daily then stop

## 2018-02-22 NOTE — ASSESSMENT & PLAN NOTE
Limited by physical activity  He is steroid dependent on prednisone 10mg daily  Continue on Ventolin, BREO and Spiriva  He is up to date with influenza vaccine this season

## 2018-02-22 NOTE — PROGRESS NOTES
Assessment/Plan:    Hypercholesterolemia  Stable on rosuvastatin 10mg daily  Monitor    Obstructive sleep apnea  Compliant with CPAP  Chronic obstructive pulmonary disease (HCC)  Limited by physical activity  He is steroid dependent on prednisone 10mg daily  Continue on Ventolin, BREO and Spiriva  He is up to date with influenza vaccine this season  Gastroesophageal reflux disease  Advised avoiding ulcergenic foods  [facility], can also use H2B for breakthrough symptoms and instructed to use pantoprazole for short courses daily then stop  1  Chronic obstructive pulmonary disease, unspecified COPD type ([address] )     2  Hypercholesterolemia     3  Obstructive sleep apnea     4  Gastroesophageal reflux disease without esophagitis           Subjective:      Patient ID: Russ Wu  is a 66 y o  male  Hyperlipidemia   This is a chronic problem  The current episode started more than 1 year ago  The problem is controlled  Associated symptoms include shortness of breath  Current antihyperlipidemic treatment includes diet change and statins (has made dietary modifications recently)  The current treatment provides significant improvement of lipids  67yo male with COPD, vitamin D deficiency, CAD, sys CHF and dilated CM and osteoporosis here for for follow up care  Gets heartburn occasionally, has been taking protonix for several years when he remembers but otherwise takes [place]  Reports COPD is controlled as long as he does not do anything too active  He remains on prednisone 10mg daily  Denies cough  Reports compliance with CPAP, denies morning HA though occasionally get daytime sleepiness      The following portions of the patient's history were reviewed and updated as appropriate: allergies, current medications, past family history, past medical history, past social history, past surgical history and problem list     Current Outpatient Prescriptions:     albuterol (2 5 mg/3 mL) 0 083 % nebulizer solution, Inhale 1 each 4 (four) times a day, Disp: , Rfl:     albuterol (VENTOLIN HFA) 90 mcg/act inhaler, Inhale, Disp: , Rfl:     aspirin 81 MG tablet, Take 1 tablet by mouth daily, Disp: , Rfl:     Cholecalciferol (VITAMIN D) 2000 units CAPS, Take by mouth, Disp: , Rfl:     fluticasone furoate-vilanterol (BREO ELLIPTA) 200-25 MCG/INH inhaler, Inhale 1 puff daily, Disp: , Rfl:     pantoprazole (PROTONIX) 40 mg tablet, Take by mouth Daily, Disp: , Rfl:     predniSONE 10 mg tablet, Take 2 tablets by mouth daily, Disp: , Rfl:     rosuvastatin (CRESTOR) 10 MG tablet, Take 1 tablet by mouth daily, Disp: , Rfl:     Tiotropium Bromide Monohydrate (SPIRIVA RESPIMAT) 2 5 MCG/ACT AERS, Inhale 2 puffs daily, Disp: , Rfl:     Review of Systems   Constitutional: Positive for activity change  HENT: Negative  Respiratory: Positive for shortness of breath  Cardiovascular: Negative  Gastrointestinal:        Heartburn   Musculoskeletal:        Calf spasms   Neurological: Negative  Psychiatric/Behavioral: Negative  Objective:    /70 (BP Location: Left arm, Patient Position: Sitting, Cuff Size: Standard)   Pulse 79   Temp (!) 96 1 °F (35 6 °C)   Resp 16   Ht 5' 5" (1 651 m)   Wt 65 3 kg (144 lb)   SpO2 90%   BMI 23 96 kg/m²      Physical Exam   Constitutional: He is oriented to person, place, and time  No distress  cachectic   HENT:   Mouth/Throat: No oropharyngeal exudate  Poor dentition   Eyes:   Wears glasses   Neck: Neck supple  Cardiovascular: Normal rate, regular rhythm, normal heart sounds and intact distal pulses  Pulmonary/Chest: Effort normal    Occasional rhonchi   Lymphadenopathy:     He has no cervical adenopathy  Neurological: He is oriented to person, place, and time  Psychiatric: He has a normal mood and affect  Vitals reviewed        Recent Results (from the past 168 hour(s))   Lipid Panel with Direct LDL reflex    Collection Time: 02/19/18  8:07 AM Result Value Ref Range    Cholesterol 122 50 - 200 mg/dL    Triglycerides 72 <=150 mg/dL    HDL, Direct 44 40 - 60 mg/dL    LDL Calculated 64 0 - 100 mg/dL

## 2018-02-26 DIAGNOSIS — E78.2 MIXED HYPERLIPIDEMIA: Primary | ICD-10-CM

## 2018-02-26 RX ORDER — ROSUVASTATIN CALCIUM 10 MG/1
TABLET, COATED ORAL
Qty: 90 TABLET | Refills: 1 | Status: SHIPPED | OUTPATIENT
Start: 2018-02-26 | End: 2018-03-06 | Stop reason: SDUPTHER

## 2018-03-06 DIAGNOSIS — E78.2 MIXED HYPERLIPIDEMIA: ICD-10-CM

## 2018-03-06 RX ORDER — ROSUVASTATIN CALCIUM 10 MG/1
TABLET, COATED ORAL
Qty: 90 TABLET | Refills: 1 | Status: SHIPPED | OUTPATIENT
Start: 2018-03-06 | End: 2020-02-05

## 2018-03-15 ENCOUNTER — OFFICE VISIT (OUTPATIENT)
Dept: PULMONOLOGY | Facility: CLINIC | Age: 63
End: 2018-03-15
Payer: MEDICARE

## 2018-03-15 VITALS
BODY MASS INDEX: 26.91 KG/M2 | DIASTOLIC BLOOD PRESSURE: 76 MMHG | TEMPERATURE: 98.4 F | OXYGEN SATURATION: 93 % | HEART RATE: 71 BPM | HEIGHT: 62 IN | SYSTOLIC BLOOD PRESSURE: 124 MMHG | WEIGHT: 146.2 LBS

## 2018-03-15 DIAGNOSIS — J96.11 CHRONIC RESPIRATORY FAILURE WITH HYPOXIA (HCC): ICD-10-CM

## 2018-03-15 DIAGNOSIS — G47.33 OBSTRUCTIVE SLEEP APNEA: ICD-10-CM

## 2018-03-15 DIAGNOSIS — J44.9 CHRONIC OBSTRUCTIVE PULMONARY DISEASE, UNSPECIFIED COPD TYPE (HCC): Primary | ICD-10-CM

## 2018-03-15 PROCEDURE — 99213 OFFICE O/P EST LOW 20 MIN: CPT | Performed by: INTERNAL MEDICINE

## 2018-03-15 RX ORDER — ALBUTEROL SULFATE 90 UG/1
2 AEROSOL, METERED RESPIRATORY (INHALATION) EVERY 6 HOURS PRN
Qty: 1 INHALER | Refills: 11 | Status: SHIPPED | OUTPATIENT
Start: 2018-03-15 | End: 2019-08-27

## 2018-03-15 NOTE — ASSESSMENT & PLAN NOTE
Overall the patient remains stable  His chronic dyspnea is combination of his severe COPD/emphysema as well as some underlying heart disease  He does appear euvolemic today  His insurance is not covering Breo and Ventolin as it previously was  I have switched him to [facility] , ProAir p r n  based off of insurance web site/formulary  I provided him with a coupon for [facility] to help with cost   After he has transition to these new inhalers for 1-2 weeks, I have asked him to decrease his prednisone from 20 mg a day to 15 mg a day  He will continue on albuterol nebulizer 4 times daily  He is unable to do pulmonary rehabilitation because of cost     He is due for lung cancer screening CT which we ordered today

## 2018-03-15 NOTE — PROGRESS NOTES
Assessment:    Chronic obstructive pulmonary disease (HCC)  Overall the patient remains stable  His chronic dyspnea is combination of his severe COPD/emphysema as well as some underlying heart disease  He does appear euvolemic today  His insurance is not covering Breo and Ventolin as it previously was  I have switched him to [employer] [facility] [employer] [facility] , ProAir p r n  based off of insurance web site/formulary  I provided him with a coupon for [employer] to help with cost   After he has transition to these new inhalers for 1-2 weeks, I have asked him to decrease his prednisone from 20 mg a day to 15 mg a day  He will continue on albuterol nebulizer 4 times daily  He is unable to do pulmonary rehabilitation because of cost     He is due for lung cancer screening CT which we ordered today  Obstructive sleep apnea  He will continue to use CPAP on a nightly basis      Plan:    Diagnoses and all orders for this visit:    Chronic obstructive pulmonary disease, unspecified COPD type ([facility] [address] )  -     CT lung screening program; Future  -     Mometasone Furo-Formoterol Fum (DULERA) 200-5 MCG/ACT AERO; Inhale 2 puffs 2 (two) times a day  -     albuterol (PROAIR HFA) 90 mcg/act inhaler; Inhale 2 puffs every 6 (six) hours as needed for wheezing    Obstructive sleep apnea    Chronic respiratory failure with hypoxia (HCC)        Follow-up:   4 monthsFollow-up:      HPI:  Overall the patient is about the same  STill with dyspnea on almost all exertion  He denies any associated cough, wheezing or chest pain  He was taking Breo once daily - the price is too high now because his insurance will no longer cover this medication  He is also having similar issues with Ventolin  He remains on prednisone 20 mg daily, albuterol nebulizer q i d , Spiriva once daily        Review of Systems    The following portions of the patient's history were reviewed and updated as appropriate: allergies, current medications, past family history, past medical history, past social history, past surgical history and problem list     VITALS:  Vitals:    03/15/18 1135   BP: 124/76   Pulse: 71   Temp: 98 4 °F (36 9 °C)   TempSrc: Tympanic   SpO2: 93%   Weight: 66 3 kg (146 lb 3 2 oz)   Height: 5' 2" (1 575 m)       Physical Exam  General:  Patient is awake, alert, non-toxic and in no acute respiratory distress  Neck: No JVD  CV:  Regular, +S1 and S2, No murmurs, gallops or rubs appreciated  Lungs: Decreased breath sounds bilateral without wheeze, rales or rhonchi  Abdomen: Soft, +BS, Non-tender, non-distended  Extremities: No clubbing, cyanosis or edema  Neuro: No focal deficits        Diagnostic Testing:    CBC:  Lab Results   Component Value Date    WBC 6 87 08/21/2017    HGB 14 1 08/21/2017    HCT 42 0 08/21/2017    MCV 98 08/21/2017     08/21/2017         BMP:   Lab Results   Component Value Date     08/21/2017    K 4 2 08/21/2017     08/21/2017    CO2 29 08/21/2017    ANIONGAP 7 08/21/2017    BUN 17 08/21/2017    CREATININE 1 09 08/21/2017    GLUCOSE 81 08/15/2016    GLUF 96 08/21/2017    CALCIUM 8 8 08/21/2017    AST 16 08/21/2017    ALT 17 08/21/2017    ALKPHOS 85 08/21/2017    PROT 7 1 08/21/2017    BILITOT 0 50 08/21/2017    EGFR 73 08/21/2017         Imaging studies:  I have personally reviewed pertinent films in PACS  [facility] CT March 2017 IMPRESSION:     Emphysema with 2 right-sided lung nodules that have been previously characterized as benign  No suspicious nodule        Suhail Bridgette, DO

## 2018-04-26 ENCOUNTER — HOSPITAL ENCOUNTER (OUTPATIENT)
Dept: RADIOLOGY | Facility: HOSPITAL | Age: 63
Discharge: HOME | End: 2018-04-26
Attending: INTERNAL MEDICINE
Payer: COMMERCIAL

## 2018-04-26 DIAGNOSIS — J44.9 CHRONIC OBSTRUCTIVE PULMONARY DISEASE, UNSPECIFIED COPD TYPE (HCC): ICD-10-CM

## 2018-05-03 ENCOUNTER — TELEPHONE (OUTPATIENT)
Dept: PULMONOLOGY | Facility: CLINIC | Age: 63
End: 2018-05-03

## 2018-06-20 ENCOUNTER — OFFICE VISIT (OUTPATIENT)
Dept: PULMONOLOGY | Facility: CLINIC | Age: 63
End: 2018-06-20
Payer: COMMERCIAL

## 2018-06-20 VITALS
HEART RATE: 73 BPM | SYSTOLIC BLOOD PRESSURE: 112 MMHG | WEIGHT: 141 LBS | OXYGEN SATURATION: 95 % | HEIGHT: 67 IN | BODY MASS INDEX: 22.13 KG/M2 | DIASTOLIC BLOOD PRESSURE: 60 MMHG | TEMPERATURE: 97.1 F

## 2018-06-20 DIAGNOSIS — J44.9 CHRONIC OBSTRUCTIVE PULMONARY DISEASE, UNSPECIFIED COPD TYPE (HCC): ICD-10-CM

## 2018-06-20 DIAGNOSIS — R91.1 INCIDENTAL LUNG NODULE, > 3MM AND < 8MM: Primary | ICD-10-CM

## 2018-06-20 DIAGNOSIS — J44.9 COPD, VERY SEVERE (HCC): ICD-10-CM

## 2018-06-20 DIAGNOSIS — G47.33 OBSTRUCTIVE SLEEP APNEA: ICD-10-CM

## 2018-06-20 PROCEDURE — 99214 OFFICE O/P EST MOD 30 MIN: CPT | Performed by: INTERNAL MEDICINE

## 2018-06-20 RX ORDER — AZITHROMYCIN 250 MG/1
250 TABLET, FILM COATED ORAL EVERY 24 HOURS
Qty: 30 TABLET | Refills: 11 | Status: SHIPPED | OUTPATIENT
Start: 2018-06-20 | End: 2018-08-23 | Stop reason: ALTCHOICE

## 2018-06-20 NOTE — PROGRESS NOTES
Assessment:    7mm Left Lower Lobe Nodule  The patient has a new 7 mm lung nodule on lung cancer screening CT from almost 2 months ago  We discussed that repeat imaging at 3 months is the most acceptable form of follow-up as the lesion is borderline for PET scanning  If there is any increase in lesion size, we will have him presented at chest tumor Board  I believe that the severity of his lung disease would likely preclude him from resection however he would be a candidate for SBRT  He will obtain CT scan of the chest next month  Very severe COPD with emphysema ([address] )  At this point the patient is quite stable from a respiratory standpoint despite being quite symptomatic  His dyspnea is multifactorial including COPD and underlying heart disease  He will remain on Dulera twice daily, albuterol nebs and MDI 4-5 times daily as well as prednisone 20 mg daily  Unfortunately there is a fair amount of cost to these inhalers and he is asking if we can change any medications to help  He will start ipratropium nebulizers with albuterol 4 times daily  When using ipratropium stop Spiriva  If ipratropium nebulizers are more expensive than Spiriva but then do not fill the prescription and remain on Spiriva 2 sprays once daily  He does not require supplemental oxygen at this time  He is unable to do pulmonary rehabilitation because of cost     Obstructive sleep apnea  He will continue to use CPAP on a nightly basis  Plan:    Diagnoses and all orders for this visit:    7mm RUL lung nodule  -     CT chest wo contrast; Future    Chronic obstructive pulmonary disease, unspecified COPD type (HCC)  -     azithromycin (ZITHROMAX) 250 mg tablet; Take 1 tablet (250 mg total) by mouth every 24 hours for 360 days  -     ipratropium (ATROVENT) 0 02 % nebulizer solution;  Take 1 vial (0 5 mg total) by nebulization 4 (four) times a day    Obstructive sleep apnea    COPD, very severe ([facility] [address] )        Follow-up:  6 weeks      HPI:  The patient reports feeling tired today    Increased dyspnea this week because of the humidity  Occasional coughing at night  He has some wheezing  Occasionally he has some chest pain on the left side    Still taking Spiriva, Dulera, Albuterol nebs QID, albuterol MDI prn (4-5 times per day), prednisone 20mg daily    He has limited a lot of his activities because of shortness of Breath  For example he no longer cuts the grass  Review of Systems   Constitutional: Negative for appetite change and unexpected weight change  Respiratory: Positive for cough, shortness of breath and wheezing  Cardiovascular: Positive for chest pain  Negative for leg swelling  Gastrointestinal: Negative for nausea         The following portions of the patient's history were reviewed and updated as appropriate: allergies, current medications, past family history, past medical history, past social history, past surgical history and problem list     VITALS:  Vitals:    06/20/18 0926   BP: 112/60   BP Location: Left arm   Patient Position: Sitting   Pulse: 73   Temp: (!) 97 1 °F (36 2 °C)   TempSrc: Tympanic   SpO2: 95%   Weight: 64 kg (141 lb)   Height: 5' 7" (1 702 m)       Physical Exam  General:  Patient is awake, alert, non-toxic and in no acute respiratory distress  Neck: No JVD  CV:  Regular, +S1 and S2, No murmurs, gallops or rubs appreciated  Lungs:  Decreased breath sounds bilateral without wheeze, rales or rhonchi  Abdomen: Soft, +BS, Non-tender, non-distended  Extremities: No clubbing, cyanosis or edema  Neuro: No focal deficits        Diagnostic Testing:    PFT August 2013: FEV1 0 86 (27% predicted), residual volume 23% predicted, DLCO 47% predicted    CBC:  Lab Results   Component Value Date    WBC 6 87 08/21/2017    HGB 14 1 08/21/2017    HCT 42 0 08/21/2017    MCV 98 08/21/2017     08/21/2017         BMP:   Lab Results   Component Value Date     08/21/2017    K 4 2 08/21/2017     08/21/2017    CO2 29 08/21/2017    ANIONGAP 7 08/21/2017    BUN 17 08/21/2017    CREATININE 1 09 08/21/2017    GLUCOSE 81 08/15/2016    GLUF 96 08/21/2017    CALCIUM 8 8 08/21/2017    AST 16 08/21/2017    ALT 17 08/21/2017    ALKPHOS 85 08/21/2017    PROT 7 1 08/21/2017    BILITOT 0 50 08/21/2017    EGFR 73 08/21/2017         Imaging studies:  I have personally reviewed pertinent films in PACS  April 2018:  CT chest, lung cancer screening  Emphysema, new 7 mm left lower lobe nodule      Jeremy Marley DO

## 2018-06-20 NOTE — ASSESSMENT & PLAN NOTE
The patient has a new 7 mm lung nodule on lung cancer screening CT from almost 2 months ago  We discussed that repeat imaging at 3 months is the most acceptable form of follow-up as the lesion is borderline for PET scanning  If there is any increase in lesion size, we will have him presented at chest tumor Board  I believe that the severity of his lung disease would likely preclude him from resection however he would be a candidate for SBRT  He will obtain CT scan of the chest next month

## 2018-06-20 NOTE — PATIENT INSTRUCTIONS
Start ipratropium nebulizers with albuterol 4 times daily  When using ipratropium stop Spiriva  If ipratropium nebulizers are more expensive than Spiriva but then do not fill the prescription and remain on Spiriva 2 sprays once daily        Continue Dulera twice daily     Obtain CT scan in July

## 2018-06-20 NOTE — ASSESSMENT & PLAN NOTE
At this point the patient is quite stable from a respiratory standpoint despite being quite symptomatic  His dyspnea is multifactorial including COPD and underlying heart disease  He will remain on Dulera twice daily, albuterol nebs and MDI 4-5 times daily as well as prednisone 20 mg daily  Unfortunately there is a fair amount of cost to these inhalers and he is asking if we can change any medications to help  He will start ipratropium nebulizers with albuterol 4 times daily  When using ipratropium stop Spiriva  If ipratropium nebulizers are more expensive than Spiriva but then do not fill the prescription and remain on Spiriva 2 sprays once daily  He does not require supplemental oxygen at this time    He is unable to do pulmonary rehabilitation because of cost

## 2018-08-06 ENCOUNTER — HOSPITAL ENCOUNTER (OUTPATIENT)
Dept: RADIOLOGY | Facility: HOSPITAL | Age: 63
Discharge: HOME | End: 2018-08-06
Attending: INTERNAL MEDICINE
Payer: COMMERCIAL

## 2018-08-06 DIAGNOSIS — R91.1 INCIDENTAL LUNG NODULE, > 3MM AND < 8MM: ICD-10-CM

## 2018-08-06 PROCEDURE — 71250 CT THORAX DX C-: CPT

## 2018-08-08 ENCOUNTER — OFFICE VISIT (OUTPATIENT)
Dept: PULMONOLOGY | Facility: CLINIC | Age: 63
End: 2018-08-08
Payer: COMMERCIAL

## 2018-08-08 VITALS
HEART RATE: 87 BPM | OXYGEN SATURATION: 89 % | HEIGHT: 62 IN | DIASTOLIC BLOOD PRESSURE: 60 MMHG | SYSTOLIC BLOOD PRESSURE: 118 MMHG | BODY MASS INDEX: 25.51 KG/M2 | TEMPERATURE: 98.5 F | RESPIRATION RATE: 18 BRPM | WEIGHT: 138.6 LBS

## 2018-08-08 DIAGNOSIS — J44.9 COPD, VERY SEVERE (HCC): ICD-10-CM

## 2018-08-08 DIAGNOSIS — R91.1 INCIDENTAL LUNG NODULE, > 3MM AND < 8MM: Primary | ICD-10-CM

## 2018-08-08 DIAGNOSIS — G47.33 OBSTRUCTIVE SLEEP APNEA: ICD-10-CM

## 2018-08-08 PROCEDURE — 99214 OFFICE O/P EST MOD 30 MIN: CPT | Performed by: INTERNAL MEDICINE

## 2018-08-08 NOTE — PROGRESS NOTES
Assessment:    Very severe COPD with emphysema ([address] )  He will continue on current inhaler regimen with daily prednisone and scheduled albuterol nebulizer treatments  Continue to increase activity as tolerated    Obstructive sleep apnea  Continue CPAP on a nightly basis  If he has problems with ongoing fatigue, will run compliance report and assess AHI  7mm Left Lower Lobe Nodule  Fortunately on repeat imaging 2 days ago, there is resolution of 7 mm pulmonary nodule  I see no further new nodules on this imaging either  Therefore no scheduled follow-up needed other than ongoing lung cancer screening CT which would be due in August of 2019  Plan:    Diagnoses and all orders for this visit:    7mm Left Lower Lobe Nodule    Very severe COPD with emphysema ([address] )    Obstructive sleep apnea        Follow-up:  6 months      HPI:  Overall the patient remains stable from a pulmonary standpoint  He continues with ongoing dyspnea on exertion particularly on hot humid days  He denies any significant cough, wheezing or chest pain  He denies any significant lower extremity edema  He has had some increased fatigue this summer  He is using his CPAP on a nightly basis without issues  He feels that he is sleeping well  His wife notes that he has had some weight loss over the past couple of months  She does feel that he is eating appropriately      Review of Systems    The following portions of the patient's history were reviewed and updated as appropriate: allergies, current medications, past family history, past medical history, past social history, past surgical history and problem list     VITALS:  Vitals:    08/08/18 1259   BP: 118/60   BP Location: Left arm   Patient Position: Sitting   Cuff Size: Adult   Pulse: 87   Resp: 18   Temp: 98 5 °F (36 9 °C)   TempSrc: Tympanic   SpO2: (!) 89%   Weight: 62 9 kg (138 lb 9 6 oz)   Height: 5' 2" (1 575 m)       Physical Exam  General:  Patient is awake, alert, non-toxic and in no acute respiratory distress  Neck: No JVD  CV:  Regular, +S1 and S2, No murmurs, gallops or rubs appreciated  Lungs:  Decreased breath sounds in all lung fields without wheeze, rales or rhonchi  Abdomen: Soft, +BS, Non-tender, non-distended  Extremities: No clubbing, cyanosis or edema  Neuro: No focal deficits        Diagnostic Testing:      CBC:  Lab Results   Component Value Date    WBC 6 87 08/21/2017    HGB 14 1 08/21/2017    HCT 42 0 08/21/2017    MCV 98 08/21/2017     08/21/2017         BMP:   Lab Results   Component Value Date     08/21/2017    K 4 2 08/21/2017     08/21/2017    CO2 29 08/21/2017    ANIONGAP 7 08/21/2017    BUN 17 08/21/2017    CREATININE 1 09 08/21/2017    GLUCOSE 81 08/15/2016    GLUF 96 08/21/2017    CALCIUM 8 8 08/21/2017    AST 16 08/21/2017    ALT 17 08/21/2017    ALKPHOS 85 08/21/2017    PROT 7 1 08/21/2017    BILITOT 0 50 08/21/2017    EGFR 73 08/21/2017         Imaging studies:  I have personally reviewed pertinent films in PACS  CT chest 08/06/2018:  Resolution of some 7 mm left lower lobe pulmonary nodule  Emphysema  No new nodules visualized        Dian Nearing, DO

## 2018-08-09 NOTE — ASSESSMENT & PLAN NOTE
Continue CPAP on a nightly basis  If he has problems with ongoing fatigue, will run compliance report and assess AHI

## 2018-08-09 NOTE — ASSESSMENT & PLAN NOTE
He will continue on current inhaler regimen with daily prednisone and scheduled albuterol nebulizer treatments  Continue to increase activity as tolerated

## 2018-08-09 NOTE — ASSESSMENT & PLAN NOTE
Fortunately on repeat imaging 2 days ago, there is resolution of 7 mm pulmonary nodule  I see no further new nodules on this imaging either  Therefore no scheduled follow-up needed other than ongoing lung cancer screening CT which would be due in August of 2019

## 2018-08-22 NOTE — PROGRESS NOTES
Assessment/Plan:    1  Routine adult health maintenance     2  Vitamin D deficiency  Vitamin D 25 hydroxy   3  Need for hepatitis C screening test  Hepatitis C antibody   4  Screening for prostate cancer  PSA, Total Screen   5  Localized osteoporosis without current pathological fracture  Vitamin D 25 hydroxy    DXA bone density spine hip and pelvis   6  Hypercholesterolemia  Lipid panel   7  Coronary artery disease involving native coronary artery of native heart without angina pectoris  Comprehensive metabolic panel    CBC    TSH, 3rd generation with Free T4 reflex        Subjective:      Patient ID: Smooth Roque  is a 66 y o  male  HPI  63yo male with HLD, KEVIN, COPD, GERD, CAD, systolic CHF, dilated CM, vitamin D def and osteoporosis here for yearly physical     Last dental visit >6months ago  Last eye visit 2 years ago, wears glasses    Immunization History   Administered Date(s) Administered    Influenza Quadrivalent Preservative Free 3 years and older IM 10/14/2014    Influenza Quadrivalent Preservative Free ID 12/18/2015, 10/12/2016    Influenza Split High Dose Preservative Free IM 09/11/2017    Influenza TIV (IM) 04/30/1954    Pneumococcal Polysaccharide PPV23 12/01/2013    Td (adult), adsorbed 01/01/2012    Tuberculin Skin Test-PPD Intradermal 01/11/2016, 01/17/2017   discussed shingrix vaccine, pt to obtain when available    Lifestyle:    Diet avoiding fast foods, takes nutritional supplements              Physical activity does housework, very seldom yardwork    reports that he drinks alcohol  rare    reports that he quit smoking about 6 years ago  He has a 92 50 pack-year smoking history  He has quit using smokeless tobacco     reports that he does not use drugs  Reproductive:     has no sexual activity history on file  Erectile dysfunction No    Cancer Screenings:   Testicular n/a   Last PSA 8/21/17   Colonoscopy 11/14/14 followed by Dr Elma Flores, next due 2024      DXA 2014    The following portions of the patient's history were reviewed and updated as appropriate: allergies, current medications, past family history, past medical history, past social history, past surgical history and problem list     Current Outpatient Prescriptions:     albuterol (2 5 mg/3 mL) 0 083 % nebulizer solution, Inhale 1 each 4 (four) times a day, Disp: , Rfl:     albuterol (PROAIR HFA) 90 mcg/act inhaler, Inhale 2 puffs every 6 (six) hours as needed for wheezing, Disp: 1 Inhaler, Rfl: 11    aspirin 81 MG tablet, Take 1 tablet by mouth daily, Disp: , Rfl:     Cholecalciferol (VITAMIN D) 2000 units CAPS, Take by mouth, Disp: , Rfl:     ipratropium (ATROVENT) 0 02 % nebulizer solution, Take 1 vial (0 5 mg total) by nebulization 4 (four) times a day, Disp: 120 vial, Rfl: 11    Mometasone Furo-Formoterol Fum (DULERA) 200-5 MCG/ACT AERO, Inhale 2 puffs 2 (two) times a day, Disp: 1 Inhaler, Rfl: 11    pantoprazole (PROTONIX) 40 mg tablet, Take by mouth Daily, Disp: , Rfl:     predniSONE 10 mg tablet, Take 2 tablets by mouth daily, Disp: , Rfl:     rosuvastatin (CRESTOR) 10 MG tablet, TAKE 1 TABLET EVERY DAY, Disp: 90 tablet, Rfl: 1    Tiotropium Bromide Monohydrate (SPIRIVA RESPIMAT) 2 5 MCG/ACT AERS, Inhale 2 puffs daily, Disp: , Rfl:     Review of Systems   Constitutional: Positive for unexpected weight change  Negative for activity change, appetite change and fatigue  HENT: Negative  Respiratory: Negative for cough and wheezing  SHARIF   Cardiovascular: Negative  Gastrointestinal: Negative  Genitourinary: Negative  Neurological: Positive for headaches  Negative for dizziness  Objective:    /70 (BP Location: Left arm, Patient Position: Sitting)   Pulse 84   Temp (!) 97 4 °F (36 3 °C)   Resp 16   Ht 5' 2" (1 575 m)   Wt 62 9 kg (138 lb 9 6 oz)   SpO2 91%   BMI 25 35 kg/m²      Physical Exam   Constitutional: He is oriented to person, place, and time  No distress  cachectic   HENT:   Right Ear: External ear normal    Mouth/Throat: No oropharyngeal exudate  L ear cerumen  Poor dentition   Eyes: Conjunctivae and EOM are normal    Wears glasses   Neck: Neck supple  No thyromegaly present  Cardiovascular: Normal rate, regular rhythm, normal heart sounds and intact distal pulses  Pulmonary/Chest: Effort normal    Occasional rhonchi, decreased BS   Abdominal: Soft  Bowel sounds are normal  He exhibits no distension  There is no tenderness  Lymphadenopathy:     He has no cervical adenopathy  Neurological: He is alert and oriented to person, place, and time  Psychiatric: He has a normal mood and affect  His behavior is normal    Vitals reviewed        PHQ-9 Depression Screening    PHQ-9:    Frequency of the following problems over the past two weeks:       Little interest or pleasure in doing things:  0 - not at all  Feeling down, depressed, or hopeless:  0 - not at all  PHQ-2 Score:  0

## 2018-08-23 ENCOUNTER — OFFICE VISIT (OUTPATIENT)
Dept: INTERNAL MEDICINE CLINIC | Facility: CLINIC | Age: 63
End: 2018-08-23
Payer: MEDICARE

## 2018-08-23 VITALS
TEMPERATURE: 97.4 F | RESPIRATION RATE: 16 BRPM | BODY MASS INDEX: 25.51 KG/M2 | HEART RATE: 84 BPM | OXYGEN SATURATION: 91 % | HEIGHT: 62 IN | DIASTOLIC BLOOD PRESSURE: 70 MMHG | WEIGHT: 138.6 LBS | SYSTOLIC BLOOD PRESSURE: 110 MMHG

## 2018-08-23 DIAGNOSIS — Z11.59 NEED FOR HEPATITIS C SCREENING TEST: ICD-10-CM

## 2018-08-23 DIAGNOSIS — E78.00 HYPERCHOLESTEROLEMIA: ICD-10-CM

## 2018-08-23 DIAGNOSIS — E55.9 VITAMIN D DEFICIENCY: ICD-10-CM

## 2018-08-23 DIAGNOSIS — Z12.5 SCREENING FOR PROSTATE CANCER: ICD-10-CM

## 2018-08-23 DIAGNOSIS — I25.10 CORONARY ARTERY DISEASE INVOLVING NATIVE CORONARY ARTERY OF NATIVE HEART WITHOUT ANGINA PECTORIS: ICD-10-CM

## 2018-08-23 DIAGNOSIS — M81.6 LOCALIZED OSTEOPOROSIS WITHOUT CURRENT PATHOLOGICAL FRACTURE: ICD-10-CM

## 2018-08-23 DIAGNOSIS — Z00.00 ROUTINE ADULT HEALTH MAINTENANCE: Primary | ICD-10-CM

## 2018-08-23 PROCEDURE — 99396 PREV VISIT EST AGE 40-64: CPT | Performed by: INTERNAL MEDICINE

## 2018-10-19 DIAGNOSIS — J44.9 CHRONIC OBSTRUCTIVE PULMONARY DISEASE, UNSPECIFIED COPD TYPE (HCC): ICD-10-CM

## 2018-10-19 RX ORDER — ALBUTEROL SULFATE 2.5 MG/3ML
2.5 SOLUTION RESPIRATORY (INHALATION) 4 TIMES DAILY
Qty: 120 VIAL | Refills: 5 | Status: SHIPPED | OUTPATIENT
Start: 2018-10-19 | End: 2020-02-03 | Stop reason: SDUPTHER

## 2018-12-23 DIAGNOSIS — J44.9 CHRONIC OBSTRUCTIVE PULMONARY DISEASE, UNSPECIFIED COPD TYPE (HCC): ICD-10-CM

## 2018-12-24 RX ORDER — MOMETASONE FUROATE AND FORMOTEROL FUMARATE DIHYDRATE 200; 5 UG/1; UG/1
2 AEROSOL RESPIRATORY (INHALATION)
Qty: 13 INHALER | Refills: 9 | Status: SHIPPED | OUTPATIENT
Start: 2018-12-24 | End: 2019-03-26

## 2019-02-11 ENCOUNTER — HOSPITAL ENCOUNTER (OUTPATIENT)
Dept: RADIOLOGY | Age: 64
Discharge: HOME | End: 2019-02-11
Payer: COMMERCIAL

## 2019-02-11 ENCOUNTER — APPOINTMENT (OUTPATIENT)
Dept: LAB | Age: 64
End: 2019-02-11
Payer: COMMERCIAL

## 2019-02-11 ENCOUNTER — TRANSCRIBE ORDERS (OUTPATIENT)
Dept: ADMINISTRATIVE | Age: 64
End: 2019-02-11

## 2019-02-11 DIAGNOSIS — I25.10 CORONARY ARTERY DISEASE INVOLVING NATIVE CORONARY ARTERY OF NATIVE HEART WITHOUT ANGINA PECTORIS: ICD-10-CM

## 2019-02-11 DIAGNOSIS — E55.9 VITAMIN D DEFICIENCY: ICD-10-CM

## 2019-02-11 DIAGNOSIS — M81.6 LOCALIZED OSTEOPOROSIS WITHOUT CURRENT PATHOLOGICAL FRACTURE: ICD-10-CM

## 2019-02-11 DIAGNOSIS — E78.00 HYPERCHOLESTEROLEMIA: ICD-10-CM

## 2019-02-11 LAB
25(OH)D3 SERPL-MCNC: 22.3 NG/ML (ref 30–100)
ALBUMIN SERPL BCP-MCNC: 4.1 G/DL (ref 3.5–5)
ALP SERPL-CCNC: 78 U/L (ref 46–116)
ALT SERPL W P-5'-P-CCNC: 22 U/L (ref 12–78)
ANION GAP SERPL CALCULATED.3IONS-SCNC: 4 MMOL/L (ref 4–13)
AST SERPL W P-5'-P-CCNC: 20 U/L (ref 5–45)
BILIRUB SERPL-MCNC: 0.6 MG/DL (ref 0.2–1)
BUN SERPL-MCNC: 14 MG/DL (ref 5–25)
CALCIUM SERPL-MCNC: 8.6 MG/DL (ref 8.3–10.1)
CHLORIDE SERPL-SCNC: 107 MMOL/L (ref 100–108)
CHOLEST SERPL-MCNC: 120 MG/DL (ref 50–200)
CO2 SERPL-SCNC: 28 MMOL/L (ref 21–32)
CREAT SERPL-MCNC: 1.09 MG/DL (ref 0.6–1.3)
ERYTHROCYTE [DISTWIDTH] IN BLOOD BY AUTOMATED COUNT: 12.6 % (ref 11.6–15.1)
GFR SERPL CREATININE-BSD FRML MDRD: 73 ML/MIN/1.73SQ M
GLUCOSE P FAST SERPL-MCNC: 81 MG/DL (ref 65–99)
HCT VFR BLD AUTO: 40.7 % (ref 36.5–49.3)
HDLC SERPL-MCNC: 32 MG/DL (ref 40–60)
HGB BLD-MCNC: 12.9 G/DL (ref 12–17)
LDLC SERPL CALC-MCNC: 75 MG/DL (ref 0–100)
MCH RBC QN AUTO: 32 PG (ref 26.8–34.3)
MCHC RBC AUTO-ENTMCNC: 31.7 G/DL (ref 31.4–37.4)
MCV RBC AUTO: 101 FL (ref 82–98)
NONHDLC SERPL-MCNC: 88 MG/DL
PLATELET # BLD AUTO: 233 THOUSANDS/UL (ref 149–390)
PMV BLD AUTO: 10.2 FL (ref 8.9–12.7)
POTASSIUM SERPL-SCNC: 4.2 MMOL/L (ref 3.5–5.3)
PROT SERPL-MCNC: 7.2 G/DL (ref 6.4–8.2)
RBC # BLD AUTO: 4.03 MILLION/UL (ref 3.88–5.62)
SODIUM SERPL-SCNC: 139 MMOL/L (ref 136–145)
TRIGL SERPL-MCNC: 65 MG/DL
TSH SERPL DL<=0.05 MIU/L-ACNC: 1.97 UIU/ML (ref 0.36–3.74)
WBC # BLD AUTO: 6.92 THOUSAND/UL (ref 4.31–10.16)

## 2019-02-11 PROCEDURE — 80061 LIPID PANEL: CPT

## 2019-02-11 PROCEDURE — 85027 COMPLETE CBC AUTOMATED: CPT

## 2019-02-11 PROCEDURE — 80053 COMPREHEN METABOLIC PANEL: CPT

## 2019-02-11 PROCEDURE — 84443 ASSAY THYROID STIM HORMONE: CPT

## 2019-02-11 PROCEDURE — 82306 VITAMIN D 25 HYDROXY: CPT

## 2019-02-11 PROCEDURE — 36415 COLL VENOUS BLD VENIPUNCTURE: CPT

## 2019-02-11 PROCEDURE — 77080 DXA BONE DENSITY AXIAL: CPT

## 2019-02-17 NOTE — PROGRESS NOTES
Assessment/Plan:    Very severe COPD with emphysema ([address] )  Exacerbated by cold weather  Overall, breathing is stable  Continue on nebs q i d , Dulera and prednisone 20 mg daily  He is followed by Pulmonary  Obstructive sleep apnea  Patient is compliant with CPAP  Osteoporosis  Left femoral neck with T-score of -2 6 with underlying vitamin-D insufficiency, previously deficient  He has severe COPD on chronic steroids  Discussed treatment with bisphosphonates and patient is agreeable to starting alendronate 70 milligrams once weekly with vitamin-D supplementation  Side effects reviewed and discussed  Plan to repeat DEXA 2/2021  Hypercholesterolemia  With CAD  Lipid panel is stable on rosuvastatin 10 mg daily  Vitamin D deficiency  Current weight insufficient despite vitamin-D 2000 units daily  Was previously deficient  Recommend starting vitamin-D 90102 units once weekly to keep his levels within normal limits  Other insomnia  He has difficulty falling asleep and maintaining sleep  Recommend melatonin titrate up to 10 mg at bedtime  If not effective, then will consider trazodone  1  Localized osteoporosis without current pathological fracture  ergocalciferol (VITAMIN D2) 50,000 units    alendronate (FOSAMAX) 70 mg tablet   2  Obstructive sleep apnea     3  Very severe COPD with emphysema ([address] )     4  Hypercholesterolemia     5  Vitamin D deficiency  ergocalciferol (VITAMIN D2) 50,000 units   6  Other insomnia         Subjective:      Patient ID: Carlus Dancer  is a 66 y o  male  65yo male with vitamin D def, osteoporosis, HLD, CAD, COPD, KEVIN, reflux, systolic CHF, dilation CM here for follow up care  He feels fatigued, he is awake frequent overnight  Takes him 1-2 hrs to fall asleep then wakes up several hours later  He has not taken anything to help him sleep  Reports he is compliant CPAP mask  Hyperlipidemia   This is a chronic problem   The current episode started more than 1 year ago  The problem is controlled  Recent lipid tests were reviewed and are normal  Associated symptoms include shortness of breath  Current antihyperlipidemic treatment includes statins  Reports COPD is the same, has to work on his own pace  Cold weather triggers SOB  He uses nebs QID, dulera and prednisone  He had surveillance DXA 2/2019 that showed LS T-score1 2, left total hip T-score -1 7 and left femoral neck T-score-2 6  He is otherwise inactive due to severity of his lung disease      The following portions of the patient's history were reviewed and updated as appropriate: allergies, current medications, past family history, past medical history, past social history, past surgical history and problem list     Current Outpatient Medications:     albuterol (2 5 mg/3 mL) 0 083 % nebulizer solution, Take 1 vial (2 5 mg total) by nebulization 4 (four) times a day, Disp: 120 vial, Rfl: 5    albuterol (PROAIR HFA) 90 mcg/act inhaler, Inhale 2 puffs every 6 (six) hours as needed for wheezing, Disp: 1 Inhaler, Rfl: 11    aspirin 81 MG tablet, Take 1 tablet by mouth daily, Disp: , Rfl:     Cholecalciferol (VITAMIN D) 2000 units CAPS, Take by mouth, Disp: , Rfl:     DULERA 200-5 MCG/ACT inhaler, INHALE 2 PUFFS 2 (TWO) TIMES A DAY, Disp: 13 Inhaler, Rfl: 9    ipratropium (ATROVENT) 0 02 % nebulizer solution, Take 1 vial (0 5 mg total) by nebulization 4 (four) times a day, Disp: 120 vial, Rfl: 5    pantoprazole (PROTONIX) 40 mg tablet, Take by mouth Daily, Disp: , Rfl:     predniSONE 10 mg tablet, Take 2 tablets by mouth daily, Disp: , Rfl:     rosuvastatin (CRESTOR) 10 MG tablet, TAKE 1 TABLET EVERY DAY, Disp: 90 tablet, Rfl: 1    Tiotropium Bromide Monohydrate (SPIRIVA RESPIMAT) 2 5 MCG/ACT AERS, Inhale 2 puffs daily, Disp: , Rfl:     alendronate (FOSAMAX) 70 mg tablet, Take 1 tablet (70 mg total) by mouth every 7 days, Disp: 12 tablet, Rfl: 3    ergocalciferol (VITAMIN D2) 50,000 units, Take 1 capsule (50,000 Units total) by mouth every 28 days, Disp: 3 capsule, Rfl: 3    Review of Systems   Constitutional: Positive for fatigue  HENT: Negative  Respiratory: Positive for shortness of breath and wheezing  SHARIF   Cardiovascular: Negative  Gastrointestinal: Negative  Musculoskeletal: Negative  Neurological: Negative  Psychiatric/Behavioral: Positive for sleep disturbance  Objective:    /70   Pulse 91   Temp 98 2 °F (36 8 °C)   Ht 5' 2" (1 575 m)   Wt 61 7 kg (136 lb)   SpO2 94%   BMI 24 87 kg/m²      Physical Exam   Constitutional: He is oriented to person, place, and time  HENT:   Mouth/Throat: Oropharynx is clear and moist    Eyes:   Wears glasses   Neck: Neck supple  Cardiovascular: Normal rate, regular rhythm and normal heart sounds  Pulmonary/Chest: Effort normal    Faint wheezing   Neurological: He is alert and oriented to person, place, and time  Psychiatric: He has a normal mood and affect  His behavior is normal    Vitals reviewed        Recent Results (from the past 504 hour(s))   Vitamin D 25 hydroxy    Collection Time: 02/11/19  9:36 AM   Result Value Ref Range    Vit D, 25-Hydroxy 22 3 (L) 30 0 - 100 0 ng/mL   Comprehensive metabolic panel    Collection Time: 02/11/19  9:36 AM   Result Value Ref Range    Sodium 139 136 - 145 mmol/L    Potassium 4 2 3 5 - 5 3 mmol/L    Chloride 107 100 - 108 mmol/L    CO2 28 21 - 32 mmol/L    ANION GAP 4 4 - 13 mmol/L    BUN 14 5 - 25 mg/dL    Creatinine 1 09 0 60 - 1 30 mg/dL    Glucose, Fasting 81 65 - 99 mg/dL    Calcium 8 6 8 3 - 10 1 mg/dL    AST 20 5 - 45 U/L    ALT 22 12 - 78 U/L    Alkaline Phosphatase 78 46 - 116 U/L    Total Protein 7 2 6 4 - 8 2 g/dL    Albumin 4 1 3 5 - 5 0 g/dL    Total Bilirubin 0 60 0 20 - 1 00 mg/dL    eGFR 73 ml/min/1 73sq m   CBC    Collection Time: 02/11/19  9:36 AM   Result Value Ref Range    WBC 6 92 4 31 - 10 16 Thousand/uL    RBC 4 03 3 88 - 5 62 Million/uL    Hemoglobin 12 9 12 0 - 17 0 g/dL    Hematocrit 40 7 36 5 - 49 3 %     (H) 82 - 98 fL    MCH 32 0 26 8 - 34 3 pg    MCHC 31 7 31 4 - 37 4 g/dL    RDW 12 6 11 6 - 15 1 %    Platelets 914 306 - 856 Thousands/uL    MPV 10 2 8 9 - 12 7 fL   Lipid panel    Collection Time: 02/11/19  9:36 AM   Result Value Ref Range    Cholesterol 120 50 - 200 mg/dL    Triglycerides 65 <=150 mg/dL    HDL, Direct 32 (L) 40 - 60 mg/dL    LDL Calculated 75 0 - 100 mg/dL    Non-HDL-Chol (CHOL-HDL) 88 mg/dl   TSH, 3rd generation with Free T4 reflex    Collection Time: 02/11/19  9:36 AM   Result Value Ref Range    TSH 3RD GENERATON 1 970 0 358 - 3 740 uIU/mL

## 2019-02-18 ENCOUNTER — OFFICE VISIT (OUTPATIENT)
Dept: INTERNAL MEDICINE CLINIC | Facility: CLINIC | Age: 64
End: 2019-02-18
Payer: COMMERCIAL

## 2019-02-18 VITALS
HEIGHT: 62 IN | TEMPERATURE: 98.2 F | BODY MASS INDEX: 25.03 KG/M2 | WEIGHT: 136 LBS | OXYGEN SATURATION: 94 % | SYSTOLIC BLOOD PRESSURE: 110 MMHG | HEART RATE: 91 BPM | DIASTOLIC BLOOD PRESSURE: 70 MMHG

## 2019-02-18 DIAGNOSIS — E78.2 MIXED HYPERLIPIDEMIA: ICD-10-CM

## 2019-02-18 DIAGNOSIS — G47.09 OTHER INSOMNIA: ICD-10-CM

## 2019-02-18 DIAGNOSIS — E55.9 VITAMIN D DEFICIENCY: ICD-10-CM

## 2019-02-18 DIAGNOSIS — E78.00 HYPERCHOLESTEROLEMIA: ICD-10-CM

## 2019-02-18 DIAGNOSIS — M81.6 LOCALIZED OSTEOPOROSIS WITHOUT CURRENT PATHOLOGICAL FRACTURE: Primary | ICD-10-CM

## 2019-02-18 DIAGNOSIS — G47.33 OBSTRUCTIVE SLEEP APNEA: ICD-10-CM

## 2019-02-18 DIAGNOSIS — J44.9 COPD, VERY SEVERE (HCC): ICD-10-CM

## 2019-02-18 PROCEDURE — 99214 OFFICE O/P EST MOD 30 MIN: CPT | Performed by: INTERNAL MEDICINE

## 2019-02-18 RX ORDER — ALENDRONATE SODIUM 70 MG/1
70 TABLET ORAL
Qty: 12 TABLET | Refills: 3 | Status: SHIPPED | OUTPATIENT
Start: 2019-02-18 | End: 2020-01-14

## 2019-02-18 RX ORDER — ERGOCALCIFEROL 1.25 MG/1
50000 CAPSULE ORAL
Qty: 3 CAPSULE | Refills: 3 | Status: SHIPPED | OUTPATIENT
Start: 2019-02-18 | End: 2019-03-06 | Stop reason: SDUPTHER

## 2019-02-18 NOTE — ASSESSMENT & PLAN NOTE
Left femoral neck with T-score of -2 6 with underlying vitamin-D insufficiency, previously deficient  He has severe COPD on chronic steroids  Discussed treatment with bisphosphonates and patient is agreeable to starting alendronate 70 milligrams once weekly with vitamin-D supplementation  Side effects reviewed and discussed  Plan to repeat DEXA 2/2021

## 2019-02-18 NOTE — ASSESSMENT & PLAN NOTE
Current weight insufficient despite vitamin-D 2000 units daily  Was previously deficient  Recommend starting vitamin-D 48729 units once weekly to keep his levels within normal limits

## 2019-02-18 NOTE — ASSESSMENT & PLAN NOTE
He has difficulty falling asleep and maintaining sleep  Recommend melatonin titrate up to 10 mg at bedtime  If not effective, then will consider trazodone

## 2019-02-18 NOTE — ASSESSMENT & PLAN NOTE
Exacerbated by cold weather  Overall, breathing is stable  Continue on nebs q i d , Dulera and prednisone 20 mg daily  He is followed by Pulmonary

## 2019-02-19 RX ORDER — ROSUVASTATIN CALCIUM 10 MG/1
TABLET, COATED ORAL
Qty: 90 TABLET | Refills: 1 | Status: SHIPPED | OUTPATIENT
Start: 2019-02-19 | End: 2019-03-06 | Stop reason: SDUPTHER

## 2019-03-06 ENCOUNTER — OFFICE VISIT (OUTPATIENT)
Dept: CARDIOLOGY CLINIC | Facility: CLINIC | Age: 64
End: 2019-03-06
Payer: COMMERCIAL

## 2019-03-06 VITALS
DIASTOLIC BLOOD PRESSURE: 58 MMHG | SYSTOLIC BLOOD PRESSURE: 100 MMHG | HEART RATE: 83 BPM | HEIGHT: 62 IN | BODY MASS INDEX: 25.58 KG/M2 | WEIGHT: 139 LBS

## 2019-03-06 DIAGNOSIS — I50.22 CHRONIC SYSTOLIC CONGESTIVE HEART FAILURE (HCC): ICD-10-CM

## 2019-03-06 DIAGNOSIS — I25.10 CORONARY ARTERY DISEASE INVOLVING NATIVE CORONARY ARTERY OF NATIVE HEART WITHOUT ANGINA PECTORIS: ICD-10-CM

## 2019-03-06 DIAGNOSIS — I42.0 CARDIOMYOPATHY, DILATED (HCC): Primary | ICD-10-CM

## 2019-03-06 DIAGNOSIS — I44.7 LEFT BUNDLE-BRANCH BLOCK: ICD-10-CM

## 2019-03-06 DIAGNOSIS — G47.33 OBSTRUCTIVE SLEEP APNEA: ICD-10-CM

## 2019-03-06 DIAGNOSIS — J44.9 COPD, VERY SEVERE (HCC): ICD-10-CM

## 2019-03-06 DIAGNOSIS — E78.00 HYPERCHOLESTEROLEMIA: ICD-10-CM

## 2019-03-06 PROCEDURE — 99214 OFFICE O/P EST MOD 30 MIN: CPT | Performed by: INTERNAL MEDICINE

## 2019-03-06 PROCEDURE — 93000 ELECTROCARDIOGRAM COMPLETE: CPT | Performed by: INTERNAL MEDICINE

## 2019-03-06 NOTE — PROGRESS NOTES
Cardiology Follow Up    Saad Staton   1955  [id number]  [address] [facility]  [address] [place] [address] [address]    1  Cardiomyopathy, dilated ([facility] )     2  Chronic systolic congestive heart failure ([facility] )     3  Coronary artery disease involving native coronary artery of native heart without angina pectoris  POCT ECG   4  Left bundle-branch block     5  Obstructive sleep apnea     6  Very severe COPD with emphysema ([facility] )     7  Hypercholesterolemia         Interval History:     Kenna Dong returns for followup given his history of a dilated cardiomyopathy and chronic systolic congestive heart failure, at least partially associated with a chronic left bundle branch block  Over the few years his ejection fraction has been hovering around 40%  He has had, however, significant lung disease, and follows with Dr Ghazal Ireland of pulmonary  Most of his symptoms appeared to be pulmonary related, but given his cardiomyopathy and the symptoms we entertained the idea of a biventricular ICD  He declined, but I also felt it would provide little clinical benefit  He follows with pulmonary closely who has been titrating steroid therapy for his lung disease  He is on minimal medications from a cardiac standpoint  His cardiomyopathy medications he did not tolerate, as he became quite hypotensive  His last few echocardiograms have demonstrated ejection fraction of around 35 - 40%  Kenna Dong overall feels about the same  He continues to have shortness of breath, but this has not changed  He denies any chest pain or any symptoms suggestive of angina  He denies any orthopnea or PND  He denies any changes in weight or any increasing lower extremity edema  He denies palpitations, or any syncopal events  From a volume standpoint he has always been compensated        Patient Active Problem List   Diagnosis    Coronary artery disease    Cardiomyopathy, dilated ([facility] )    Very severe COPD with emphysema ([facility] )    Chronic systolic congestive heart failure (HCC)    Left bundle-branch block    Obstructive sleep apnea    Hypercholesterolemia    Gastroesophageal reflux disease    Impaired fasting glucose    Osteoarthritis    Osteoporosis    Increased frequency of urination    Vitamin D deficiency    Mood disorder (HCC)    7mm Left Lower Lobe Nodule    Other insomnia     Past Medical History:   Diagnosis Date    Cardiomyopathy ([facility] )     Chest pain     COPD (chronic obstructive pulmonary disease) (HCC)     Emphysema of lung (HCC)     Hypoxia     nocturnal    Left bundle branch block     Multiple pulmonary nodules     last assessed: 10/12/16    Smoker      Social History     Socioeconomic History    Marital status: /Civil Union     Spouse name: Not on file    Number of children: Not on file    Years of education: Not on file    Highest education level: Not on file   Occupational History    Not on file   Social Needs    Financial resource strain: Not on file    Food insecurity:     Worry: Not on file     Inability: Not on file    Transportation needs:     Medical: Not on file     Non-medical: Not on file   Tobacco Use    Smoking status: Former Smoker     Packs/day: 2 50     Years: 37 00     Pack years: 92 50     Last attempt to quit:      Years since quittin 1    Smokeless tobacco: Former User    Tobacco comment: 1 ppd for 37 years, 2010 down to 5 cigs a day, is around second hand smoke   Substance and Sexual Activity    Alcohol use: Yes     Comment: rarely    Drug use: No    Sexual activity: Not on file   Lifestyle    Physical activity:     Days per week: Not on file     Minutes per session: Not on file    Stress: Not on file   Relationships    Social connections:     Talks on phone: Not on file     Gets together: Not on file     Attends Nondenominational service: Not on file     Active member of club or organization: Not on file     Attends meetings of clubs or organizations: Not on file     Relationship status: Not on file    Intimate partner violence:     Fear of current or ex partner: Not on file     Emotionally abused: Not on file     Physically abused: Not on file     Forced sexual activity: Not on file   Other Topics Concern    Not on file   Social History Narrative    Daily coffee consumption: 8 or more cups a day          Family History   Problem Relation Age of Onset    Diabetes Mother      Past Surgical History:   Procedure Laterality Date    CARDIAC CATHETERIZATION         Current Outpatient Medications:     albuterol (2 5 mg/3 mL) 0 083 % nebulizer solution, Take 1 vial (2 5 mg total) by nebulization 4 (four) times a day, Disp: 120 vial, Rfl: 5    albuterol (PROAIR HFA) 90 mcg/act inhaler, Inhale 2 puffs every 6 (six) hours as needed for wheezing, Disp: 1 Inhaler, Rfl: 11    alendronate (FOSAMAX) 70 mg tablet, Take 1 tablet (70 mg total) by mouth every 7 days, Disp: 12 tablet, Rfl: 3    aspirin 81 MG tablet, Take 1 tablet by mouth daily, Disp: , Rfl:     Cholecalciferol (VITAMIN D) 2000 units CAPS, Take by mouth, Disp: , Rfl:     ipratropium (ATROVENT) 0 02 % nebulizer solution, Take 1 vial (0 5 mg total) by nebulization 4 (four) times a day, Disp: 120 vial, Rfl: 5    pantoprazole (PROTONIX) 40 mg tablet, Take by mouth Daily, Disp: , Rfl:     predniSONE 10 mg tablet, Take 2 tablets by mouth daily, Disp: , Rfl:     rosuvastatin (CRESTOR) 10 MG tablet, TAKE 1 TABLET EVERY DAY, Disp: 90 tablet, Rfl: 1    Tiotropium Bromide Monohydrate (SPIRIVA RESPIMAT) 2 5 MCG/ACT AERS, Inhale 2 puffs daily, Disp: , Rfl:     DULERA 200-5 MCG/ACT inhaler, INHALE 2 PUFFS 2 (TWO) TIMES A DAY (Patient not taking: Reported on 3/6/2019), Disp: 13 Inhaler, Rfl: 9  No Known Allergies    Labs:  Lab Results   Component Value Date     08/17/2015    K 4 2 02/11/2019    K 3 9 08/17/2015     02/11/2019  (H) 08/17/2015    CO2 28 02/11/2019    CO2 30 08/17/2015    BUN 14 02/11/2019    BUN 13 08/17/2015    CREATININE 1 09 02/11/2019    CREATININE 1 04 08/17/2015    GLUCOSE 90 08/17/2015    CALCIUM 8 6 02/11/2019    CALCIUM 8 8 08/17/2015     Imaging:  ECG today reveals sinus rhythm with a left bundle branch block  ECHO (8/17):  LEFT VENTRICLE:  Systolic function was moderately reduced  Ejection fraction was estimated to be 35 %  There was moderate diffuse hypokinesis with regional variations  Doppler parameters were consistent with abnormal left ventricular relaxation (grade 1 diastolic dysfunction)      VENTRICULAR SEPTUM:  There was dyssynergic motion  These changes are consistent with LBBB  Review of Systems:  Review of Systems   Constitutional: Positive for fatigue  HENT: Negative  Eyes: Negative  Respiratory: Positive for shortness of breath  Cardiovascular: Negative  Gastrointestinal: Negative  Musculoskeletal: Negative  Skin: Negative  Allergic/Immunologic: Negative  Neurological: Negative  Hematological: Negative  Psychiatric/Behavioral: Negative  All other systems reviewed and are negative  Physical Exam:  Physical Exam   Constitutional: He is oriented to person, place, and time  He appears well-developed and well-nourished  HENT:   Head: Normocephalic and atraumatic  Eyes: Pupils are equal, round, and reactive to light  EOM are normal  Right eye exhibits no discharge  Left eye exhibits no discharge  No scleral icterus  Neck: Normal range of motion  Neck supple  No JVD present  No thyromegaly present  Cardiovascular: Normal rate, regular rhythm, S1 normal, S2 normal, intact distal pulses and normal pulses  No extrasystoles are present  Exam reveals distant heart sounds  Exam reveals no gallop and no friction rub  No murmur heard  Pulmonary/Chest: Effort normal  No respiratory distress  He has decreased breath sounds  He has no wheezes   He has no rales  Abdominal: Soft  Bowel sounds are normal  He exhibits no distension  There is no tenderness  Musculoskeletal: Normal range of motion  He exhibits no edema, tenderness or deformity  Neurological: He is alert and oriented to person, place, and time  No cranial nerve deficit  Skin: Skin is warm and dry  No rash noted  Psychiatric: He has a normal mood and affect  Judgment and thought content normal    Nursing note and vitals reviewed  Discussion/Summary:    1  Chronic respiratory failure - This appears to be more pulmonary related, but at least has a combination of both COPD and systolic CHF  He  Is currently not requiring oxygen, but does wear CPAP at night for sleep apnea  He will continue to follow with Pulmonary closely  2   Chronic systolic CHF - This is secondary to a dilated cardiomyopathy and a left bundle branch block  Ejection fractions appear to range between 35 and 40%  We have had the discussion of a biventricular ICD, which I do feel would provide little if any benefit  Also his ejection fractions have been over 35% on a few occasions  No changes were made to his medical therapy  He should continue to watch his sodium intake and weight, for any signs of volume overload  We will see him back in 6 months  3   Dilated cardiomyopathy - Ejection fraction around 35-40%  He has been unable to tolerate goal-directed medical therapy due to hypotension  After our next visit we will check an updated Echo  4   CAD - Nonobstructive by cardiac catheterization  Continue medical management  Patient on simvastatin for dyslipidemia  Counseling / Coordination of Care  Total office time spent today 25 minutes  Greater than 50% of total time was spent with the patient and / or family counseling and / or coordination of care

## 2019-03-06 NOTE — PATIENT INSTRUCTIONS
Low-Sodium Diet   AMBULATORY CARE:   A low-sodium diet  limits foods that are high in sodium (salt)  You will need to follow a low-sodium diet if you have high blood pressure, kidney disease, or heart failure  You may also need to follow this diet if you have a condition that is causing your body to retain (hold) extra fluid  You may need to limit the amount of sodium you eat to 1,500 mg  Ask your healthcare provider how much sodium you can have each day  How to use food labels to choose foods that are low in sodium:  Read food labels to find the amount of sodium they contain  The amount of sodium is listed in milligrams (mg)  The % Daily Value (DV) column tells you how much of your daily needs are met by 1 serving of the food for each nutrient listed  Choose foods that have less than 5% of the DV of sodium  These foods are considered low in sodium  Foods that have 20% or more of the DV of sodium are considered high in sodium  Some food labels may also list any of the following terms that tell you about the sodium content in the food:  · Sodium-free:  Less than 5 mg in each serving    · Very low sodium:  35 mg of sodium or less in each serving    · Low sodium:  140 mg of sodium or less in each serving    · Reduced sodium: At least 25% less sodium in each serving than the regular type    · Light in sodium:  50% less sodium in each serving    · Unsalted or no added salt:  No extra salt is added during processing (the food may still contain sodium)  Foods to avoid:  Salty foods are high in sodium   You should avoid the following:  · Processed foods:      ¨ Mixes for cornbread, biscuits, cake, and pudding     ¨ Instant foods, such as potatoes, cereals, noodles, and rice     ¨ Packaged foods, such as bread stuffing, rice and pasta mixes, snack dip mixes, and macaroni and cheese     ¨ Canned foods, such as canned vegetables, soups, broths, sauces, and vegetable or tomato juice    ¨ Snack foods, such as salted chips, popcorn, pretzels, pork rinds, salted crackers, and salted nuts    ¨ Frozen foods, such as dinners, entrees, vegetables with sauces, and breaded meats    ¨ Sauerkraut, pickled vegetables, and other foods prepared in brine    · Meats and cheeses:      ¨ Smoked or cured meat, such as corned beef, jean, ham, hot dogs, and sausage    ¨ Canned meats or spreads, such as potted meats, sardines, anchovies, and imitation seafood    ¨ Deli or lunch meats, such as bologna, ham, turkey, and roast beef    ¨ Processed cheese, such as American cheese and cheese spreads    · Condiments, sauces, and seasonings:      ¨ Salt (¼ teaspoon of salt contains 575 mg of sodium)    ¨ Seasonings made with salt, such as garlic salt, celery salt, onion salt, and seasoned salt    ¨ Regular soy sauce, barbecue sauce, teriyaki sauce, steak sauce, Worcestershire sauce, and most flavored vinegars    ¨ Canned gravy and mixes     ¨ Regular condiments, such as mustard, ketchup, and salad dressings    ¨ Pickles and olives    ¨ Meat tenderizers and monosodium glutamate (MSG)  Foods to include:  Read the food label to find the exact amount of sodium in each serving  · Bread and cereal:  Try to choose breads with less than 80 mg of sodium per serving  ¨ Bread, roll, mario, tortilla, or unsalted crackers  ¨ Ready-to-eat cereals with less than 5% DV of sodium (examples include shredded wheat and puffed rice)    ¨ Pasta    · Vegetables and fruits:      ¨ Unsalted fresh, frozen, or canned vegetables    ¨ Fresh, frozen, or canned fruits    ¨ Fruit juice    · Dairy:  One serving has about 150 mg of sodium  ¨ Milk, all types    ¨ Yogurt    ¨ Hard cheese, such as cheddar, Swiss, [employer], or mozzarella    · Meat and other protein foods:  Some raw meats may have added sodium       ¨ Plain meats, fish, and poultry     ¨ Eggs    · Other foods:      ¨ Homemade pudding    ¨ Unsalted nuts, popcorn, or pretzels    ¨ Unsalted butter or margarine  Ways to decrease sodium:   · Add spices and herbs to foods instead of salt during cooking  Use salt-free seasonings to add flavor to foods  Examples include onion powder, garlic powder, basil, goodrich powder, paprika, and parsley  Try lemon or lime juice or vinegar to give foods a tart flavor  Use hot peppers, pepper, or cayenne pepper to add a spicy flavor to foods  · Do not keep a salt shaker at your kitchen table  This may help keep you from adding salt to food at the table  It may take time to get used to enjoying the natural flavor of food instead of adding salt  Talk to your healthcare provider before you use salt substitutes  Some salt substitutes have a high amount of potassium and need to be avoided if you have kidney disease  · Choose low-sodium foods at restaurants  Meals from restaurants are often high in sodium  Some restaurants have nutrition information on the menu that tells you the amount of sodium in their foods  If possible, ask for your food to be prepared with less, or no salt  · Shop for unsalted or low-sodium foods and snacks at the grocery store  Examples include unsalted or low-sodium broths, soups, and canned vegetables  Choose fresh or frozen vegetables instead  Choose unsalted nuts or seeds or fresh fruits or vegetables as snacks  Read food labels and choose salt-free, very low-sodium, or low-sodium foods  © 2017 [address] [employer] Information is for End User's use only and may not be sold, redistributed or otherwise used for commercial purposes  All illustrations and images included in CareNotes® are the copyrighted property of [employer]  or [employer]  The above information is an  only  It is not intended as medical advice for individual conditions or treatments  Talk to your doctor, nurse or pharmacist before following any medical regimen to see if it is safe and effective for you

## 2019-03-26 ENCOUNTER — OFFICE VISIT (OUTPATIENT)
Dept: PULMONOLOGY | Facility: CLINIC | Age: 64
End: 2019-03-26
Payer: COMMERCIAL

## 2019-03-26 VITALS
SYSTOLIC BLOOD PRESSURE: 118 MMHG | BODY MASS INDEX: 24.99 KG/M2 | DIASTOLIC BLOOD PRESSURE: 68 MMHG | WEIGHT: 135.8 LBS | TEMPERATURE: 97.9 F | HEIGHT: 62 IN | OXYGEN SATURATION: 96 % | HEART RATE: 80 BPM

## 2019-03-26 DIAGNOSIS — J44.9 COPD, VERY SEVERE (HCC): Primary | ICD-10-CM

## 2019-03-26 DIAGNOSIS — G47.33 OBSTRUCTIVE SLEEP APNEA: ICD-10-CM

## 2019-03-26 DIAGNOSIS — R91.1 INCIDENTAL LUNG NODULE, > 3MM AND < 8MM: ICD-10-CM

## 2019-03-26 PROCEDURE — 99213 OFFICE O/P EST LOW 20 MIN: CPT | Performed by: INTERNAL MEDICINE

## 2019-03-26 NOTE — ASSESSMENT & PLAN NOTE
Overall the patient remains stable  He continues to have significant respiratory symptoms  I think the noise that he is making is increased wheezing because he is not on any chronic daily inhalers other than DuoNeb  He remains on prednisone 20 milligrams daily  Medications:   Most importantly, we need to get him back on a chronic daily inhaler  I have given him samples of Trelegy today, and coupon to get trilogy 1 puff once daily  He will otherwise remain on DuoNeb 4 times daily  Pulmonary rehab:   He is unable to complete pulmonary rehab because of the cost   He is staying active at home as best he can  Immunizations:         Flu:  He did not get a flu shot this year      PPV23:  Up-to-date   Lung cancer screening CT:   Due August 2019   Other testing:   None needed

## 2019-03-26 NOTE — PROGRESS NOTES
Assessment:    Very severe COPD with emphysema [facility]  Overall the patient remains stable  He continues to have significant respiratory symptoms  I think the noise that he is making is increased wheezing because he is not on any chronic daily inhalers other than DuoNeb  He remains on prednisone 20 milligrams daily  Medications:   Most importantly, we need to get him back on a chronic daily inhaler  I have given him samples of Trelegy today, and coupon to get trilogy 1 puff once daily  He will otherwise remain on DuoNeb 4 times daily  Pulmonary rehab:   He is unable to complete pulmonary rehab because of the cost   He is staying active at home as best he can  Immunizations:         Flu:  He did not get a flu shot this year  PPV23:  Up-to-date   Lung cancer screening CT:   Due August 2019   Other testing:   None needed      Obstructive sleep apnea  He will continue on CPAP on a nightly basis      Plan:    Diagnoses and all orders for this visit:    Very severe COPD with emphysema ([facility]  -     fluticasone-umeclidinium-vilanterol (TRELEGY ELLIPTA) 100-62 5-25 MCG/INH inhaler; Inhale 1 puff daily Rinse mouth after use    -     CT lung screening program; Future    Obstructive sleep apnea    7mm Left Lower Lobe Nodule        Follow-up:  6 months      HPI:  He continues to get frustrated because he can't do as much as he wants to do    He started having a "gurgling" or rattling when he gets shorts of breath with exertion  No cough or phelgm production  No chest pain  No LE swelling  He lost 11 pounds over the past year    He is not taking Dulera or spiriva because of the cost  He is only taking albuterol/ipratoprium  He has been off for 2 months    Uses Cpap at night      Review of Systems    The following portions of the patient's history were reviewed and updated as appropriate: allergies, current medications, past family history, past medical history, past social history, past surgical history and problem list     VITALS:  Vitals:    03/26/19 0916   BP: 118/68   BP Location: Left arm   Patient Position: Sitting   Pulse: 80   Temp: 97 9 °F (36 6 °C)   TempSrc: Tympanic   SpO2: 96%   Weight: 61 6 kg (135 lb 12 8 oz)   Height: 5' 2" (1 575 m)       Physical Exam  General:  Patient is awake, alert, non-toxic and in no acute respiratory distress  CV:  Regular, +S1 and S2, No murmurs, gallops or rubs appreciated  Lungs:  Decreased breath sounds in all lung fields with scattered expiratory wheeze, no rales or rhonchi appreciated  Abdomen: Soft, +BS, Non-tender, non-distended  Extremities: No clubbing, cyanosis or edema  Neuro: No focal deficits        Diagnostic Testing:    CBC:  Lab Results   Component Value Date    WBC 6 92 02/11/2019    HGB 12 9 02/11/2019    HCT 40 7 02/11/2019     (H) 02/11/2019     02/11/2019         BMP:   Lab Results   Component Value Date     08/17/2015    K 4 2 02/11/2019     02/11/2019    CO2 28 02/11/2019    ANIONGAP 6 08/17/2015    BUN 14 02/11/2019    CREATININE 1 09 02/11/2019    GLUCOSE 90 08/17/2015    GLUF 81 02/11/2019    CALCIUM 8 6 02/11/2019    AST 20 02/11/2019    ALT 22 02/11/2019    ALKPHOS 78 02/11/2019    PROT 6 8 08/17/2015    BILITOT 0 60 08/17/2015    EGFR 73 02/11/2019       Cliff Campoverde,

## 2019-03-27 DIAGNOSIS — J44.9 CHRONIC OBSTRUCTIVE PULMONARY DISEASE, UNSPECIFIED COPD TYPE (HCC): ICD-10-CM

## 2019-05-14 ENCOUNTER — OFFICE VISIT (OUTPATIENT)
Dept: PULMONOLOGY | Facility: CLINIC | Age: 64
End: 2019-05-14
Payer: COMMERCIAL

## 2019-05-14 ENCOUNTER — DOCUMENTATION (OUTPATIENT)
Dept: PULMONOLOGY | Facility: CLINIC | Age: 64
End: 2019-05-14

## 2019-05-14 VITALS
TEMPERATURE: 97.8 F | BODY MASS INDEX: 21.16 KG/M2 | HEIGHT: 67 IN | WEIGHT: 134.8 LBS | RESPIRATION RATE: 16 BRPM | HEART RATE: 77 BPM | OXYGEN SATURATION: 98 % | DIASTOLIC BLOOD PRESSURE: 60 MMHG | SYSTOLIC BLOOD PRESSURE: 100 MMHG

## 2019-05-14 DIAGNOSIS — J44.9 CHRONIC OBSTRUCTIVE PULMONARY DISEASE, UNSPECIFIED COPD TYPE (HCC): Primary | ICD-10-CM

## 2019-05-14 DIAGNOSIS — R91.1 INCIDENTAL LUNG NODULE, > 3MM AND < 8MM: ICD-10-CM

## 2019-05-14 DIAGNOSIS — J44.9 COPD, VERY SEVERE (HCC): ICD-10-CM

## 2019-05-14 DIAGNOSIS — G47.33 OBSTRUCTIVE SLEEP APNEA: ICD-10-CM

## 2019-05-14 PROCEDURE — 94618 PULMONARY STRESS TESTING: CPT | Performed by: PHYSICIAN ASSISTANT

## 2019-05-14 PROCEDURE — 94010 BREATHING CAPACITY TEST: CPT | Performed by: PHYSICIAN ASSISTANT

## 2019-05-14 PROCEDURE — 99213 OFFICE O/P EST LOW 20 MIN: CPT | Performed by: PHYSICIAN ASSISTANT

## 2019-05-15 DIAGNOSIS — J44.9 COPD, VERY SEVERE (HCC): Primary | ICD-10-CM

## 2019-05-23 ENCOUNTER — OFFICE VISIT (OUTPATIENT)
Dept: INTERNAL MEDICINE CLINIC | Facility: CLINIC | Age: 64
End: 2019-05-23
Payer: COMMERCIAL

## 2019-05-23 VITALS
HEART RATE: 72 BPM | BODY MASS INDEX: 20.97 KG/M2 | TEMPERATURE: 98.1 F | WEIGHT: 133.6 LBS | OXYGEN SATURATION: 92 % | DIASTOLIC BLOOD PRESSURE: 68 MMHG | RESPIRATION RATE: 16 BRPM | SYSTOLIC BLOOD PRESSURE: 112 MMHG | HEIGHT: 67 IN

## 2019-05-23 DIAGNOSIS — E55.9 VITAMIN D DEFICIENCY: ICD-10-CM

## 2019-05-23 DIAGNOSIS — J44.9 COPD, VERY SEVERE (HCC): Primary | ICD-10-CM

## 2019-05-23 DIAGNOSIS — D75.89 MACROCYTOSIS WITHOUT ANEMIA: ICD-10-CM

## 2019-05-23 DIAGNOSIS — E78.00 HYPERCHOLESTEROLEMIA: ICD-10-CM

## 2019-05-23 PROCEDURE — 99214 OFFICE O/P EST MOD 30 MIN: CPT | Performed by: INTERNAL MEDICINE

## 2019-05-23 PROCEDURE — 3008F BODY MASS INDEX DOCD: CPT | Performed by: INTERNAL MEDICINE

## 2019-05-23 PROCEDURE — 1036F TOBACCO NON-USER: CPT | Performed by: INTERNAL MEDICINE

## 2019-08-01 ENCOUNTER — HOSPITAL ENCOUNTER (OUTPATIENT)
Dept: RADIOLOGY | Facility: HOSPITAL | Age: 64
Discharge: HOME | End: 2019-08-01
Attending: INTERNAL MEDICINE
Payer: COMMERCIAL

## 2019-08-01 ENCOUNTER — TRANSCRIBE ORDERS (OUTPATIENT)
Dept: RADIOLOGY | Facility: HOSPITAL | Age: 64
End: 2019-08-01

## 2019-08-01 DIAGNOSIS — J44.9 COPD, VERY SEVERE (HCC): ICD-10-CM

## 2019-08-01 PROCEDURE — 93922 UPR/L XTREMITY ART 2 LEVELS: CPT

## 2019-08-05 ENCOUNTER — APPOINTMENT (OUTPATIENT)
Dept: LAB | Age: 64
End: 2019-08-05
Payer: COMMERCIAL

## 2019-08-05 DIAGNOSIS — Z11.59 NEED FOR HEPATITIS C SCREENING TEST: ICD-10-CM

## 2019-08-05 DIAGNOSIS — Z12.5 SCREENING FOR PROSTATE CANCER: ICD-10-CM

## 2019-08-05 DIAGNOSIS — D75.89 MACROCYTOSIS WITHOUT ANEMIA: ICD-10-CM

## 2019-08-05 LAB
BASOPHILS # BLD AUTO: 0.07 THOUSANDS/ΜL (ref 0–0.1)
BASOPHILS NFR BLD AUTO: 1 % (ref 0–1)
EOSINOPHIL # BLD AUTO: 0.23 THOUSAND/ΜL (ref 0–0.61)
EOSINOPHIL NFR BLD AUTO: 4 % (ref 0–6)
ERYTHROCYTE [DISTWIDTH] IN BLOOD BY AUTOMATED COUNT: 12.8 % (ref 11.6–15.1)
HCT VFR BLD AUTO: 44.8 % (ref 36.5–49.3)
HCV AB SER QL: NORMAL
HGB BLD-MCNC: 14 G/DL (ref 12–17)
IMM GRANULOCYTES # BLD AUTO: 0.01 THOUSAND/UL (ref 0–0.2)
IMM GRANULOCYTES NFR BLD AUTO: 0 % (ref 0–2)
LYMPHOCYTES # BLD AUTO: 1.45 THOUSANDS/ΜL (ref 0.6–4.47)
LYMPHOCYTES NFR BLD AUTO: 27 % (ref 14–44)
MCH RBC QN AUTO: 31.5 PG (ref 26.8–34.3)
MCHC RBC AUTO-ENTMCNC: 31.3 G/DL (ref 31.4–37.4)
MCV RBC AUTO: 101 FL (ref 82–98)
MONOCYTES # BLD AUTO: 0.69 THOUSAND/ΜL (ref 0.17–1.22)
MONOCYTES NFR BLD AUTO: 13 % (ref 4–12)
NEUTROPHILS # BLD AUTO: 2.93 THOUSANDS/ΜL (ref 1.85–7.62)
NEUTS SEG NFR BLD AUTO: 55 % (ref 43–75)
NRBC BLD AUTO-RTO: 0 /100 WBCS
PLATELET # BLD AUTO: 241 THOUSANDS/UL (ref 149–390)
PMV BLD AUTO: 10.2 FL (ref 8.9–12.7)
PSA SERPL-MCNC: 1.6 NG/ML (ref 0–4)
RBC # BLD AUTO: 4.45 MILLION/UL (ref 3.88–5.62)
WBC # BLD AUTO: 5.38 THOUSAND/UL (ref 4.31–10.16)

## 2019-08-05 PROCEDURE — 36415 COLL VENOUS BLD VENIPUNCTURE: CPT

## 2019-08-05 PROCEDURE — 86803 HEPATITIS C AB TEST: CPT

## 2019-08-05 PROCEDURE — 85025 COMPLETE CBC W/AUTO DIFF WBC: CPT

## 2019-08-05 PROCEDURE — G0103 PSA SCREENING: HCPCS

## 2019-08-11 DIAGNOSIS — E78.2 MIXED HYPERLIPIDEMIA: ICD-10-CM

## 2019-08-12 RX ORDER — ROSUVASTATIN CALCIUM 10 MG/1
TABLET, COATED ORAL
Qty: 90 TABLET | Refills: 1 | Status: SHIPPED | OUTPATIENT
Start: 2019-08-12 | End: 2019-08-27 | Stop reason: SDUPTHER

## 2019-08-21 DIAGNOSIS — J44.9 CHRONIC OBSTRUCTIVE PULMONARY DISEASE, UNSPECIFIED COPD TYPE (HCC): ICD-10-CM

## 2019-08-27 ENCOUNTER — OFFICE VISIT (OUTPATIENT)
Dept: PULMONOLOGY | Facility: CLINIC | Age: 64
End: 2019-08-27
Payer: COMMERCIAL

## 2019-08-27 VITALS
BODY MASS INDEX: 24.11 KG/M2 | OXYGEN SATURATION: 94 % | HEIGHT: 62 IN | DIASTOLIC BLOOD PRESSURE: 70 MMHG | WEIGHT: 131 LBS | SYSTOLIC BLOOD PRESSURE: 100 MMHG | HEART RATE: 73 BPM | TEMPERATURE: 97.3 F

## 2019-08-27 DIAGNOSIS — J44.9 STAGE 3 SEVERE COPD BY GOLD CLASSIFICATION (HCC): ICD-10-CM

## 2019-08-27 DIAGNOSIS — J44.9 COPD, VERY SEVERE (HCC): Primary | ICD-10-CM

## 2019-08-27 DIAGNOSIS — G47.33 OBSTRUCTIVE SLEEP APNEA: ICD-10-CM

## 2019-08-27 PROCEDURE — 99214 OFFICE O/P EST MOD 30 MIN: CPT | Performed by: INTERNAL MEDICINE

## 2019-08-27 RX ORDER — ALBUTEROL SULFATE 90 UG/1
2 AEROSOL, METERED RESPIRATORY (INHALATION) EVERY 6 HOURS PRN
Qty: 1 INHALER | Refills: 6 | Status: SHIPPED | OUTPATIENT
Start: 2019-08-27 | End: 2020-02-25

## 2019-08-27 RX ORDER — AZITHROMYCIN 250 MG/1
250 TABLET, FILM COATED ORAL EVERY 24 HOURS
Qty: 30 TABLET | Refills: 6 | Status: ON HOLD | OUTPATIENT
Start: 2019-08-27 | End: 2020-01-03

## 2019-08-27 NOTE — ASSESSMENT & PLAN NOTE
He has a new 2 mm pulmonary nodule on lung cancer screening CT  Because of his extensive smoking history, he needs a repeat CT in 12 months  I ordered this today for CT August 2020

## 2019-08-27 NOTE — PROGRESS NOTES
Assessment:    Stage 3 severe COPD by GOLD classification ([address] )  The patient continues to remain quite symptomatic from a pulmonary standpoint  I think it is the fact that he has severe obstructive lung disease as well as some cardiac impairment that worsens his symptoms  I encouraged him to continue aerobic activity as tolerated-he is unable to do pulmonary rehab because of cost of copay  Will remain on Trelegy, DuoNeb  I have asked him to restart Zithromax 250 mg daily  I am hoping to try and decrease prednisone  I asked him to go from 20 mg a day to 15 mg a day for the next 2 weeks  If he continues to do well, then he should decrease to 10 mg a day  While I do not believe he is currently a lung transplant candidate, I do believe he would benefit from an evaluation from our transplant team to discussed options  Obstructive sleep apnea  Unfortunately when he started on supplemental oxygen, he stopped his CPAP  I think that is what is contributing to his fatigue  I have asked him to go back on the CPAP mask but to add oxygen to the CPAP with 3 L nasal cannula  Incidental lung nodule, less than or equal to 3mm  He has a new 2 mm pulmonary nodule on lung cancer screening CT  Because of his extensive smoking history, he needs a repeat CT in 12 months  I ordered this today for CT August 2020  Plan:    Diagnoses and all orders for this visit:    Very severe COPD with emphysema ([address] )  -     albuterol (VENTOLIN HFA) 90 mcg/act inhaler; Inhale 2 puffs every 6 (six) hours as needed for wheezing  -     azithromycin (ZITHROMAX) 250 mg tablet; Take 1 tablet (250 mg total) by mouth every 24 hours for 210 days  -     CT chest wo contrast; Future    Obstructive sleep apnea    Stage 3 severe COPD by GOLD classification ([address] )        Follow-up:  6 months      HPI:  Increased day time fatigue over the past several months  He is wanting to take a nap throughout the day    He was started on supplemental oxygen in May, and at that time  CPAP because he thought he needed to be wearing the oxygen  He is also using the oxygen when he is active - he has an oximeter with goal to keep saturations greater than 90%  He would desaturate even on the oxygen particularly on hot humid days this summer  He continues to have significant dyspnea on exertion  He has a mild chronic cough  No chest pain or wheezing  He remains on Trelegy 1 puff daily, Spiriva daily, prednisone 20 mg daily, DuoNeb 4 times daily  Review of Systems   Constitutional: Positive for unexpected weight change  Negative for activity change  Respiratory: Positive for shortness of breath  Negative for wheezing  Cardiovascular: Negative for chest pain and leg swelling  Musculoskeletal: Negative for arthralgias and gait problem  Skin: Negative for rash  The following portions of the patient's history were reviewed and updated as appropriate: allergies, current medications, past family history, past medical history, past social history, past surgical history and problem list     VITALS:  Vitals:    08/27/19 1251   BP: 100/70   BP Location: Left arm   Patient Position: Sitting   Pulse: 73   Temp: (!) 97 3 °F (36 3 °C)   TempSrc: Tympanic   SpO2: 94%   Weight: 59 4 kg (131 lb)   Height: 5' 2" (1 575 m)       Physical Exam  General:  Patient is awake, alert, non-toxic and in no acute respiratory distress  Neck: No JVD  CV:  Regular, +S1 and S2, No murmurs, gallops or rubs appreciated  Lungs:  Decreased breath sounds in all lung fields with scattered expiratory wheezes  Abdomen: Soft, +BS, Non-tender, non-distended  Extremities: No clubbing, cyanosis or edema  Neuro: No focal deficits        Diagnostic Testing:    Office Spirometry Results:     May 2019:  FEV1 31% predicted  In 2013 was 27% predicted      CBC:  Lab Results   Component Value Date    WBC 5 38 08/05/2019    HGB 14 0 08/05/2019    HCT 44 8 08/05/2019     (H) 08/05/2019  08/05/2019         BMP:   Lab Results   Component Value Date     08/17/2015    K 4 2 02/11/2019     02/11/2019    CO2 28 02/11/2019    ANIONGAP 6 08/17/2015    BUN 14 02/11/2019    CREATININE 1 09 02/11/2019    GLUCOSE 90 08/17/2015    GLUF 81 02/11/2019    CALCIUM 8 6 02/11/2019    AST 20 02/11/2019    ALT 22 02/11/2019    ALKPHOS 78 02/11/2019    PROT 6 8 08/17/2015    BILITOT 0 60 08/17/2015    EGFR 73 02/11/2019         Imaging studies:  I have personally reviewed pertinent films in PACS  CT August 2019:  Emphysema, incidental 2 mm pulmonary nodule      Sean Garcia, DO

## 2019-08-27 NOTE — PATIENT INSTRUCTIONS
- take prednisone 1 and half tablets daily (15mg) for 2 weeks, then decrease to 1 tablet (10mg) daily    -start Zithromax 1 tablet daily

## 2019-08-27 NOTE — ASSESSMENT & PLAN NOTE
The patient continues to remain quite symptomatic from a pulmonary standpoint  I think it is the fact that he has severe obstructive lung disease as well as some cardiac impairment that worsens his symptoms  I encouraged him to continue aerobic activity as tolerated-he is unable to do pulmonary rehab because of cost of copay  Will remain on Trelegy, DuoNeb  I have asked him to restart Zithromax 250 mg daily  I am hoping to try and decrease prednisone  I asked him to go from 20 mg a day to 15 mg a day for the next 2 weeks  If he continues to do well, then he should decrease to 10 mg a day  While I do not believe he is currently a lung transplant candidate, I do believe he would benefit from an evaluation from our transplant team to discussed options

## 2019-08-27 NOTE — ASSESSMENT & PLAN NOTE
Unfortunately when he started on supplemental oxygen, he stopped his CPAP  I think that is what is contributing to his fatigue  I have asked him to go back on the CPAP mask but to add oxygen to the CPAP with 3 L nasal cannula

## 2019-08-29 ENCOUNTER — OFFICE VISIT (OUTPATIENT)
Dept: INTERNAL MEDICINE CLINIC | Facility: CLINIC | Age: 64
End: 2019-08-29
Payer: COMMERCIAL

## 2019-08-29 VITALS
HEART RATE: 86 BPM | BODY MASS INDEX: 24.14 KG/M2 | TEMPERATURE: 97.8 F | RESPIRATION RATE: 16 BRPM | SYSTOLIC BLOOD PRESSURE: 100 MMHG | DIASTOLIC BLOOD PRESSURE: 70 MMHG | WEIGHT: 131.2 LBS | HEIGHT: 62 IN | OXYGEN SATURATION: 91 %

## 2019-08-29 DIAGNOSIS — D75.89 MACROCYTOSIS WITHOUT ANEMIA: ICD-10-CM

## 2019-08-29 DIAGNOSIS — R79.89 ABNORMAL CBC: ICD-10-CM

## 2019-08-29 DIAGNOSIS — R63.4 WEIGHT LOSS: Primary | ICD-10-CM

## 2019-08-29 PROCEDURE — 99214 OFFICE O/P EST MOD 30 MIN: CPT | Performed by: INTERNAL MEDICINE

## 2019-08-29 NOTE — ASSESSMENT & PLAN NOTE
Elevated relative monocytes with normal absolute counts, will repeat CBD    Macrocytosis without anemia

## 2019-08-29 NOTE — PROGRESS NOTES
Assessment/Plan:    Problem List Items Addressed This Visit        Other    Macrocytosis without anemia     Mild, persistent  Check vitamin B12/folate, SPEP/UPEP  Relevant Orders    CBC and differential    Protein electrophoresis, serum    Protein electrophoresis, urine    Vitamin B12    Folate    Abnormal CBC     Elevated relative monocytes with normal absolute counts, will repeat CBD  Macrocytosis without anemia          Relevant Orders    CBC and differential    CT abdomen pelvis w wo contrast    Vitamin B12    Folate    Weight loss - Primary     13pound weight loss over 1 5yrs despite good appetite  Has severe COPD that may be the cause of his cachexia and weight loss  Had recent CT chest showing new 2mm nodule that is now on surveillance  Will obtain CT a/p for additional information, r/o mass  He is up to date with his cancer screening for his age group, PSA normal and colonoscopy next due 2024  TSH WNL  CBC abnormal and will work up  Relevant Orders    CBC and differential    CT abdomen pelvis w wo contrast    Basic metabolic panel          Subjective:      Patient ID: Michele Ramsey  is a 60 y o  male  HPI  22-year-old male with CAD, HLD, vitamin-D deficiency, macrocytosis KEVIN, COPD CHF osteoporosis with vitamin-D deficiency, CM for evaluation of weight loss  His weight in 2016 was noted to be 138pounds, remained stable in 2017 and in 2018 was 144pounds  In 2019 his weight decreased to 136 and over the past few months have continued to do so and is currently at 131 pounds  His appetite is robust and remains physically active doing housework and yardwork  He constantly feels fatigued over the past several years despite adequate sleep overnight  Reports he has been missing the adapter that attaches to his CPAP mask over the past several months but is compliant with supplemental oxygen      The following portions of the patient's history were reviewed and updated as appropriate: allergies, current medications, past family history, past medical history, past social history, past surgical history and problem list     Current Outpatient Medications:     albuterol (2 5 mg/3 mL) 0 083 % nebulizer solution, Take 1 vial (2 5 mg total) by nebulization 4 (four) times a day, Disp: 120 vial, Rfl: 5    albuterol (VENTOLIN HFA) 90 mcg/act inhaler, Inhale 2 puffs every 6 (six) hours as needed for wheezing, Disp: 1 Inhaler, Rfl: 6    alendronate (FOSAMAX) 70 mg tablet, Take 1 tablet (70 mg total) by mouth every 7 days, Disp: 12 tablet, Rfl: 3    aspirin 81 MG tablet, Take 1 tablet by mouth daily, Disp: , Rfl:     azithromycin (ZITHROMAX) 250 mg tablet, Take 1 tablet (250 mg total) by mouth every 24 hours for 210 days, Disp: 30 tablet, Rfl: 6    Cholecalciferol (VITAMIN D) 2000 units CAPS, Take by mouth, Disp: , Rfl:     fluticasone-umeclidinium-vilanterol (TRELEGY ELLIPTA) 100-62 5-25 MCG/INH inhaler, Inhale 1 puff daily Rinse mouth after use , Disp: 1 Inhaler, Rfl: 11    ipratropium (ATROVENT) 0 02 % nebulizer solution, Take 1 vial (0 5 mg total) by nebulization 4 (four) times a day, Disp: 120 vial, Rfl: 5    pantoprazole (PROTONIX) 40 mg tablet, Take by mouth Daily, Disp: , Rfl:     predniSONE 10 mg tablet, Take 2 tablets by mouth daily, Disp: , Rfl:     rosuvastatin (CRESTOR) 10 MG tablet, TAKE 1 TABLET EVERY DAY, Disp: 90 tablet, Rfl: 1    Review of Systems   Constitutional: Positive for fatigue and unexpected weight change  Negative for appetite change  Respiratory:        SHARIF   Cardiovascular: Negative for chest pain, palpitations and leg swelling  Gastrointestinal: Negative  Genitourinary: Negative  Neurological: Negative for dizziness, numbness and headaches  Psychiatric/Behavioral: Negative for sleep disturbance         Objective:    /70 (BP Location: Left arm, Patient Position: Sitting)   Pulse 86   Temp 97 8 °F (36 6 °C)   Resp 16   Ht 5' 2" (1 575 m)   Wt 59 5 kg (131 lb 3 2 oz)   SpO2 91%   BMI 24 00 kg/m²      Physical Exam   Constitutional: He is oriented to person, place, and time  Vital signs are normal  He appears cachectic  No distress  HENT:   Right Ear: External ear normal    Left Ear: External ear normal    Nose: Nose normal    Mouth/Throat: Oropharynx is clear and moist  No oropharyngeal exudate  Poor dentition   Eyes:   Wears glasses   Neck: No thyromegaly present  Cardiovascular: Normal rate, regular rhythm, normal heart sounds and intact distal pulses  Pulmonary/Chest: He has wheezes  He has rhonchi  Abdominal: Soft  Bowel sounds are normal  He exhibits no distension  There is no tenderness  Musculoskeletal:   Thoracic kyphosis   Lymphadenopathy:     He has no cervical adenopathy  Neurological: He is alert and oriented to person, place, and time  Psychiatric: His mood appears anxious  Vitals reviewed        Recent Results (from the past 840 hour(s))   Hepatitis C antibody    Collection Time: 08/05/19  8:38 AM   Result Value Ref Range    Hepatitis C Ab Non-reactive Non-reactive   PSA, Total Screen    Collection Time: 08/05/19  8:38 AM   Result Value Ref Range    PSA 1 6 0 0 - 4 0 ng/mL   CBC and differential    Collection Time: 08/05/19  8:38 AM   Result Value Ref Range    WBC 5 38 4 31 - 10 16 Thousand/uL    RBC 4 45 3 88 - 5 62 Million/uL    Hemoglobin 14 0 12 0 - 17 0 g/dL    Hematocrit 44 8 36 5 - 49 3 %     (H) 82 - 98 fL    MCH 31 5 26 8 - 34 3 pg    MCHC 31 3 (L) 31 4 - 37 4 g/dL    RDW 12 8 11 6 - 15 1 %    MPV 10 2 8 9 - 12 7 fL    Platelets 918 511 - 039 Thousands/uL    nRBC 0 /100 WBCs    Neutrophils Relative 55 43 - 75 %    Immat GRANS % 0 0 - 2 %    Lymphocytes Relative 27 14 - 44 %    Monocytes Relative 13 (H) 4 - 12 %    Eosinophils Relative 4 0 - 6 %    Basophils Relative 1 0 - 1 %    Neutrophils Absolute 2 93 1 85 - 7 62 Thousands/µL    Immature Grans Absolute 0 01 0 00 - 0 20 Thousand/uL    Lymphocytes Absolute 1 45 0 60 - 4 47 Thousands/µL    Monocytes Absolute 0 69 0 17 - 1 22 Thousand/µL    Eosinophils Absolute 0 23 0 00 - 0 61 Thousand/µL    Basophils Absolute 0 07 0 00 - 0 10 Thousands/µL

## 2019-09-03 ENCOUNTER — APPOINTMENT (OUTPATIENT)
Dept: LAB | Facility: HOSPITAL | Age: 64
End: 2019-09-03
Payer: COMMERCIAL

## 2019-09-03 DIAGNOSIS — R79.89 ABNORMAL CBC: ICD-10-CM

## 2019-09-03 DIAGNOSIS — R63.4 WEIGHT LOSS: ICD-10-CM

## 2019-09-03 DIAGNOSIS — D75.89 MACROCYTOSIS WITHOUT ANEMIA: ICD-10-CM

## 2019-09-03 LAB
ANION GAP SERPL CALCULATED.3IONS-SCNC: 4 MMOL/L (ref 4–13)
BASOPHILS # BLD AUTO: 0.06 THOUSANDS/ΜL (ref 0–0.1)
BASOPHILS NFR BLD AUTO: 1 % (ref 0–1)
BUN SERPL-MCNC: 15 MG/DL (ref 5–25)
CALCIUM SERPL-MCNC: 9.3 MG/DL (ref 8.3–10.1)
CHLORIDE SERPL-SCNC: 106 MMOL/L (ref 100–108)
CO2 SERPL-SCNC: 29 MMOL/L (ref 21–32)
CREAT SERPL-MCNC: 1.09 MG/DL (ref 0.6–1.3)
EOSINOPHIL # BLD AUTO: 0.14 THOUSAND/ΜL (ref 0–0.61)
EOSINOPHIL NFR BLD AUTO: 2 % (ref 0–6)
ERYTHROCYTE [DISTWIDTH] IN BLOOD BY AUTOMATED COUNT: 12.9 % (ref 11.6–15.1)
FOLATE SERPL-MCNC: 19.6 NG/ML (ref 3.1–17.5)
GFR SERPL CREATININE-BSD FRML MDRD: 72 ML/MIN/1.73SQ M
GLUCOSE P FAST SERPL-MCNC: 82 MG/DL (ref 65–99)
HCT VFR BLD AUTO: 40.6 % (ref 36.5–49.3)
HGB BLD-MCNC: 12.7 G/DL (ref 12–17)
IMM GRANULOCYTES # BLD AUTO: 0.01 THOUSAND/UL (ref 0–0.2)
IMM GRANULOCYTES NFR BLD AUTO: 0 % (ref 0–2)
LYMPHOCYTES # BLD AUTO: 1.87 THOUSANDS/ΜL (ref 0.6–4.47)
LYMPHOCYTES NFR BLD AUTO: 29 % (ref 14–44)
MCH RBC QN AUTO: 31.7 PG (ref 26.8–34.3)
MCHC RBC AUTO-ENTMCNC: 31.3 G/DL (ref 31.4–37.4)
MCV RBC AUTO: 101 FL (ref 82–98)
MONOCYTES # BLD AUTO: 0.72 THOUSAND/ΜL (ref 0.17–1.22)
MONOCYTES NFR BLD AUTO: 11 % (ref 4–12)
NEUTROPHILS # BLD AUTO: 3.66 THOUSANDS/ΜL (ref 1.85–7.62)
NEUTS SEG NFR BLD AUTO: 57 % (ref 43–75)
NRBC BLD AUTO-RTO: 0 /100 WBCS
PLATELET # BLD AUTO: 209 THOUSANDS/UL (ref 149–390)
PMV BLD AUTO: 9.8 FL (ref 8.9–12.7)
POTASSIUM SERPL-SCNC: 4.3 MMOL/L (ref 3.5–5.3)
RBC # BLD AUTO: 4.01 MILLION/UL (ref 3.88–5.62)
SODIUM SERPL-SCNC: 139 MMOL/L (ref 136–145)
VIT B12 SERPL-MCNC: 358 PG/ML (ref 100–900)
WBC # BLD AUTO: 6.46 THOUSAND/UL (ref 4.31–10.16)

## 2019-09-03 PROCEDURE — 82607 VITAMIN B-12: CPT

## 2019-09-03 PROCEDURE — 84165 PROTEIN E-PHORESIS SERUM: CPT | Performed by: PATHOLOGY

## 2019-09-03 PROCEDURE — 36415 COLL VENOUS BLD VENIPUNCTURE: CPT

## 2019-09-03 PROCEDURE — 84166 PROTEIN E-PHORESIS/URINE/CSF: CPT

## 2019-09-03 PROCEDURE — 80048 BASIC METABOLIC PNL TOTAL CA: CPT

## 2019-09-03 PROCEDURE — 84166 PROTEIN E-PHORESIS/URINE/CSF: CPT | Performed by: PATHOLOGY

## 2019-09-03 PROCEDURE — 84165 PROTEIN E-PHORESIS SERUM: CPT

## 2019-09-03 PROCEDURE — 85025 COMPLETE CBC W/AUTO DIFF WBC: CPT

## 2019-09-03 PROCEDURE — 82746 ASSAY OF FOLIC ACID SERUM: CPT

## 2019-09-04 ENCOUNTER — HOSPITAL ENCOUNTER (OUTPATIENT)
Dept: RADIOLOGY | Age: 64
Discharge: HOME | End: 2019-09-04
Payer: COMMERCIAL

## 2019-09-04 DIAGNOSIS — R63.4 WEIGHT LOSS: ICD-10-CM

## 2019-09-04 DIAGNOSIS — R79.89 ABNORMAL CBC: ICD-10-CM

## 2019-09-04 PROCEDURE — 74177 CT ABD & PELVIS W/CONTRAST: CPT

## 2019-09-04 RX ADMIN — IOHEXOL 100 ML: 350 INJECTION, SOLUTION INTRAVENOUS at 10:58

## 2019-09-05 LAB
ALBUMIN SERPL ELPH-MCNC: 4.49 G/DL (ref 3.5–5)
ALBUMIN SERPL ELPH-MCNC: 63.3 % (ref 52–65)
ALBUMIN UR ELPH-MCNC: 100 %
ALPHA1 GLOB MFR UR ELPH: 0 %
ALPHA1 GLOB SERPL ELPH-MCNC: 0.33 G/DL (ref 0.1–0.4)
ALPHA1 GLOB SERPL ELPH-MCNC: 4.6 % (ref 2.5–5)
ALPHA2 GLOB MFR UR ELPH: 0 %
ALPHA2 GLOB SERPL ELPH-MCNC: 0.7 G/DL (ref 0.4–1.2)
ALPHA2 GLOB SERPL ELPH-MCNC: 9.8 % (ref 7–13)
B-GLOBULIN MFR UR ELPH: 0 %
BETA GLOB ABNORMAL SERPL ELPH-MCNC: 0.45 G/DL (ref 0.4–0.8)
BETA1 GLOB SERPL ELPH-MCNC: 6.4 % (ref 5–13)
BETA2 GLOB SERPL ELPH-MCNC: 4.6 % (ref 2–8)
BETA2+GAMMA GLOB SERPL ELPH-MCNC: 0.33 G/DL (ref 0.2–0.5)
GAMMA GLOB ABNORMAL SERPL ELPH-MCNC: 0.8 G/DL (ref 0.5–1.6)
GAMMA GLOB MFR UR ELPH: 0 %
GAMMA GLOB SERPL ELPH-MCNC: 11.3 % (ref 12–22)
IGG/ALB SER: 1.72 {RATIO} (ref 1.1–1.8)
PROT PATTERN SERPL ELPH-IMP: ABNORMAL
PROT PATTERN UR ELPH-IMP: NORMAL
PROT SERPL-MCNC: 7.1 G/DL (ref 6.4–8.2)
PROT UR-MCNC: 6 MG/DL

## 2019-09-11 DIAGNOSIS — N32.89 BLADDER MASS: Primary | ICD-10-CM

## 2019-09-19 ENCOUNTER — OFFICE VISIT (OUTPATIENT)
Dept: PULMONOLOGY | Facility: CLINIC | Age: 64
End: 2019-09-19
Payer: COMMERCIAL

## 2019-09-19 VITALS
HEART RATE: 80 BPM | HEIGHT: 62 IN | DIASTOLIC BLOOD PRESSURE: 80 MMHG | BODY MASS INDEX: 24.03 KG/M2 | TEMPERATURE: 97.5 F | OXYGEN SATURATION: 94 % | WEIGHT: 130.6 LBS | SYSTOLIC BLOOD PRESSURE: 112 MMHG

## 2019-09-19 DIAGNOSIS — Z01.818 ENCOUNTER FOR PRE-TRANSPLANT EVALUATION FOR LUNG TRANSPLANT: Primary | ICD-10-CM

## 2019-09-19 PROCEDURE — 99214 OFFICE O/P EST MOD 30 MIN: CPT | Performed by: INTERNAL MEDICINE

## 2019-09-19 NOTE — LETTER
September 19, 2019     Tanja Wynne DO  [address]  [address], [address] [facility],[address]    Patient: Luis Abreu  YOB: 1955   Date of Visit: 9/19/2019       Dear Dr Alden Cruz: Thank you for referring Samantha Gaffney to me for evaluation  Below are my notes for this consultation  If you have questions, please do not hesitate to call me  I look forward to following your patient along with you  Sincerely,        Patricia Machuca MD        CC: DO Patricia King MD  9/19/2019 11:37 AM  Sign at close encounter  Lung transplant evaluation  Luis Abreu  60 y o  male MRN: [id number]  9/19/2019      Assessment and plan:  Luis Abreu  has gold stage C/D COPD  He is on maximal therapy with trilogy, nebulizers 4 times per day, azithromycin, prednisone 10 mg per day, nocturnal oxygen and as needed during the day  We discussed today the option of lung transplant  I explained to the patient lengthy the process, listing, surgery and postop management of patients with lung transplant  I explained the risk which includes but not limited to high morbidity and mortality in the evaluation procedures before the transplant, especially during procedures that require anesthesia such as colonoscopy, mortality risk during the surgery and short and long-term postop complications with 5 year survival approximately 60% in patients after lung transplant  I explained the immunosuppressive state after transplant due to usually triple therapy with immunomodulators medications  He understood and not interested at this point to continue with lung transplant evaluation  Also discussed the option of endobronchial valves and the patient is also not interested to continue with this  I told him that if he gets sicker in the future these options are available for him and will be happy to see him if clinically indicated      Regarding his COPD, I offered pulmonary rehab but is not interested at this point   I also told him that he should try to decrease the prednisone dose as much as possible due to the side effects  He told me that his pulmonologist, Dr Kaleigh Caraballo, is trying to decrease the dose as much as possible without causing flares of the COPD  He will continue follow-up with Dr Kaleigh Caraballo for his COPD  Bren Bautista MD/PhD,  [facility]      Return if symptoms worsen or fail to improve  History of Present Illness  This is 60y o  year old male with previous medical history of C/D COPD, congestive systolic heart failure and heavy smoking history  For his COPD takes trilogy once daily, nebulizers 4 times per day, oxygen at night, and during the day as needed  He is not using oxygen at rest   He is reporting shortness of breath in mild physical activity such as mowing the yd  He smoked 2-3 packs per day and stopped approximately 12 years ago  He can walk less than a block but needs to stop due to acute exacerbation of COPD  He has multiple flares per year but without hospitalizations  He takes prednisone chronic 10 mg per day  She came today to talk about advanced options for COPD including lung transplant and endobronchial valves  Social history:   Smoked 2-3 packs per day for 40 years  Stop smoking 12 years ago  Does not drink alcohol  Occupational history:   Unemployed  Review of Systems   Constitutional: Negative  HENT: Negative  Eyes: Negative  Respiratory: Positive for cough, chest tightness and shortness of breath  Cardiovascular: Negative  Gastrointestinal: Negative  Endocrine: Negative  Genitourinary: Negative  Musculoskeletal: Negative  Skin: Negative  Allergic/Immunologic: Negative  Neurological: Negative  Hematological: Negative  Psychiatric/Behavioral: Negative          Historical Information   Past Medical History:   Diagnosis Date    Cardiomyopathy [facility])     Chest pain     COPD (chronic obstructive pulmonary disease) (HCC)     Emphysema of lung (HCC)     Hypoxia     nocturnal    Left bundle branch block     Multiple pulmonary nodules     last assessed: 10/12/16    Smoker      Past Surgical History:   Procedure Laterality Date    CARDIAC CATHETERIZATION       Family History   Problem Relation Age of Onset    Diabetes Mother        Meds/Allergies     Current Outpatient Medications:     albuterol (2 5 mg/3 mL) 0 083 % nebulizer solution, Take 1 vial (2 5 mg total) by nebulization 4 (four) times a day, Disp: 120 vial, Rfl: 5    albuterol (VENTOLIN HFA) 90 mcg/act inhaler, Inhale 2 puffs every 6 (six) hours as needed for wheezing, Disp: 1 Inhaler, Rfl: 6    alendronate (FOSAMAX) 70 mg tablet, Take 1 tablet (70 mg total) by mouth every 7 days, Disp: 12 tablet, Rfl: 3    aspirin 81 MG tablet, Take 1 tablet by mouth daily, Disp: , Rfl:     azithromycin (ZITHROMAX) 250 mg tablet, Take 1 tablet (250 mg total) by mouth every 24 hours for 210 days, Disp: 30 tablet, Rfl: 6    Cholecalciferol (VITAMIN D) 2000 units CAPS, Take by mouth, Disp: , Rfl:     fluticasone-umeclidinium-vilanterol (TRELEGY ELLIPTA) 100-62 5-25 MCG/INH inhaler, Inhale 1 puff daily Rinse mouth after use , Disp: 1 Inhaler, Rfl: 11    ipratropium (ATROVENT) 0 02 % nebulizer solution, Take 1 vial (0 5 mg total) by nebulization 4 (four) times a day, Disp: 120 vial, Rfl: 5    pantoprazole (PROTONIX) 40 mg tablet, Take by mouth Daily, Disp: , Rfl:     predniSONE 10 mg tablet, Take 2 tablets by mouth daily, Disp: , Rfl:     rosuvastatin (CRESTOR) 10 MG tablet, TAKE 1 TABLET EVERY DAY, Disp: 90 tablet, Rfl: 1  No Known Allergies    Vitals: Blood pressure 112/80, pulse 80, temperature 97 5 °F (36 4 °C), temperature source Tympanic, height 5' 2" (1 575 m), weight 59 2 kg (130 lb 9 6 oz), SpO2 94 %  Body mass index is 23 89 kg/m²   Oxygen Therapy  SpO2: 94 %  Oxygen Therapy: None (Room air)    Physical Exam  Physical Exam Constitutional: He is oriented to person, place, and time  He appears well-developed and well-nourished  No distress  HENT:   Head: Normocephalic and atraumatic  Eyes: Pupils are equal, round, and reactive to light  EOM are normal    Neck: Normal range of motion  Neck supple  No JVD present  Cardiovascular: Normal rate, regular rhythm and normal heart sounds  Exam reveals no friction rub  No murmur heard  Pulmonary/Chest: Effort normal  No stridor  No respiratory distress  He has no wheezes  He has rales  Barrel chest   Abdominal: Soft  He exhibits no distension  Musculoskeletal: Normal range of motion  He exhibits no edema or deformity  Neurological: He is alert and oriented to person, place, and time  Skin: Skin is warm  He is not diaphoretic  Psychiatric: He has a normal mood and affect  Labs: I have personally reviewed pertinent lab results  Lab Results   Component Value Date    WBC 6 46 09/03/2019    HGB 12 7 09/03/2019    HCT 40 6 09/03/2019     (H) 09/03/2019     09/03/2019     Lab Results   Component Value Date    GLUCOSE 90 08/17/2015    CALCIUM 9 3 09/03/2019     08/17/2015    K 4 3 09/03/2019    CO2 29 09/03/2019     09/03/2019    BUN 15 09/03/2019    CREATININE 1 09 09/03/2019     No results found for: IGE  Lab Results   Component Value Date    ALT 22 02/11/2019    AST 20 02/11/2019    ALKPHOS 78 02/11/2019    BILITOT 0 60 08/17/2015       Imaging and other studies:   I have personally reviewed pertinent imaging studies in PACS  6MW test 05/2019  Patient perform 6 minutes walk study  Prem Beltran had started heart rate of 100 with SpO2 of 93%   He ambulated 126 meters and SpO2 fell to 87% with heart rate of 102   Patient was placed on 3 liters/minute nasal cannula and his oxygen saturations improved to 97%   Patient was able to ambulate 441 meters   Final heart rate of 104 beats per minute, and SPO2 97% on 3 LPM NC      Echo 08/2017  LEFT VENTRICLE: Size was normal  Systolic function was moderately reduced  Ejection fraction was estimated to be 35 %  There was moderate diffuse hypokinesis with regional variations  Wall thickness was normal  DOPPLER: There was an increased relative contribution of atrial contraction to ventricular filling  Doppler parameters were consistent with abnormal left ventricular relaxation (grade 1 diastolic dysfunction)  VENTRICULAR SEPTUM: There was dyssynergic motion  These changes are consistent with LBBB  RIGHT VENTRICLE: The size was normal  Systolic function was normal  Wall thickness was normal   LEFT ATRIUM: Size was normal   RIGHT ATRIUM: Size was normal   MITRAL VALVE: Valve structure was normal  There was normal leaflet separation  DOPPLER: The transmitral velocity was within the normal range  There was no evidence for stenosis  There was trace regurgitation  AORTIC VALVE: The valve was trileaflet  Leaflets exhibited normal thickness and normal cuspal separation  DOPPLER: Transaortic velocity was within the normal range  There was no evidence for stenosis  There was trace regurgitation  TRICUSPID VALVE: The valve structure was normal  There was normal leaflet separation  DOPPLER: The transtricuspid velocity was within the normal range  There was no evidence for stenosis  There was trace regurgitation  Pulmonary artery  systolic pressure was within the normal range  Estimated peak PA pressure was 20 mmHg  PULMONIC VALVE: Leaflets exhibited normal thickness, no calcification, and normal cuspal separation  DOPPLER: The transpulmonic velocity was within the normal range  There was trace regurgitation  PERICARDIUM: There was no pericardial effusion  The pericardium was normal in appearance  AORTA: The root exhibited normal size      SYSTEMIC VEINS: IVC: The inferior vena cava was normal in size   Respirophasic changes were normal

## 2019-09-19 NOTE — PROGRESS NOTES
Lung transplant evaluation  Donna Guido  60 y o  male MRN: [id number]  9/19/2019      Assessment and plan:  Donna Guido  has gold stage C/D COPD  He is on maximal therapy with trilogy, nebulizers 4 times per day, azithromycin, prednisone 10 mg per day, nocturnal oxygen and as needed during the day  We discussed today the option of lung transplant  I explained to the patient lengthy the process, listing, surgery and postop management of patients with lung transplant  I explained the risk which includes but not limited to high morbidity and mortality in the evaluation procedures before the transplant, especially during procedures that require anesthesia such as colonoscopy, mortality risk during the surgery and short and long-term postop complications with 5 year survival approximately 60% in patients after lung transplant  I explained the immunosuppressive state after transplant due to usually triple therapy with immunomodulators medications  He understood and not interested at this point to continue with lung transplant evaluation  Also discussed the option of endobronchial valves and the patient is also not interested to continue with this  I told him that if he gets sicker in the future these options are available for him and will be happy to see him if clinically indicated  Regarding his COPD, I offered pulmonary rehab but is not interested at this point  I also told him that he should try to decrease the prednisone dose as much as possible due to the side effects  He told me that his pulmonologist, Dr Yajaira Medellin, is trying to decrease the dose as much as possible without causing flares of the COPD  He will continue follow-up with Dr Yajaira Medellin for his COPD  Yue eNss MD/PhD,  [facility]      Return if symptoms worsen or fail to improve      History of Present Illness  This is 60y o  year old male with previous medical history of C/D COPD, congestive systolic heart failure and heavy smoking history  For his COPD takes trilogy once daily, nebulizers 4 times per day, oxygen at night, and during the day as needed  He is not using oxygen at rest   He is reporting shortness of breath in mild physical activity such as mowing the yd  He smoked 2-3 packs per day and stopped approximately 12 years ago  He can walk less than a block but needs to stop due to acute exacerbation of COPD  He has multiple flares per year but without hospitalizations  He takes prednisone chronic 10 mg per day  She came today to talk about advanced options for COPD including lung transplant and endobronchial valves  Social history:   Smoked 2-3 packs per day for 40 years  Stop smoking 12 years ago  Does not drink alcohol  Occupational history:   Unemployed  Review of Systems   Constitutional: Negative  HENT: Negative  Eyes: Negative  Respiratory: Positive for cough, chest tightness and shortness of breath  Cardiovascular: Negative  Gastrointestinal: Negative  Endocrine: Negative  Genitourinary: Negative  Musculoskeletal: Negative  Skin: Negative  Allergic/Immunologic: Negative  Neurological: Negative  Hematological: Negative  Psychiatric/Behavioral: Negative          Historical Information   Past Medical History:   Diagnosis Date    Cardiomyopathy [facility])     Chest pain     COPD (chronic obstructive pulmonary disease) (HCC)     Emphysema of lung (HCC)     Hypoxia     nocturnal    Left bundle branch block     Multiple pulmonary nodules     last assessed: 10/12/16    Smoker      Past Surgical History:   Procedure Laterality Date    CARDIAC CATHETERIZATION       Family History   Problem Relation Age of Onset    Diabetes Mother        Meds/Allergies     Current Outpatient Medications:     albuterol (2 5 mg/3 mL) 0 083 % nebulizer solution, Take 1 vial (2 5 mg total) by nebulization 4 (four) times a day, Disp: 120 vial, Rfl: 5   albuterol (VENTOLIN HFA) 90 mcg/act inhaler, Inhale 2 puffs every 6 (six) hours as needed for wheezing, Disp: 1 Inhaler, Rfl: 6    alendronate (FOSAMAX) 70 mg tablet, Take 1 tablet (70 mg total) by mouth every 7 days, Disp: 12 tablet, Rfl: 3    aspirin 81 MG tablet, Take 1 tablet by mouth daily, Disp: , Rfl:     azithromycin (ZITHROMAX) 250 mg tablet, Take 1 tablet (250 mg total) by mouth every 24 hours for 210 days, Disp: 30 tablet, Rfl: 6    Cholecalciferol (VITAMIN D) 2000 units CAPS, Take by mouth, Disp: , Rfl:     fluticasone-umeclidinium-vilanterol (TRELEGY ELLIPTA) 100-62 5-25 MCG/INH inhaler, Inhale 1 puff daily Rinse mouth after use , Disp: 1 Inhaler, Rfl: 11    ipratropium (ATROVENT) 0 02 % nebulizer solution, Take 1 vial (0 5 mg total) by nebulization 4 (four) times a day, Disp: 120 vial, Rfl: 5    pantoprazole (PROTONIX) 40 mg tablet, Take by mouth Daily, Disp: , Rfl:     predniSONE 10 mg tablet, Take 2 tablets by mouth daily, Disp: , Rfl:     rosuvastatin (CRESTOR) 10 MG tablet, TAKE 1 TABLET EVERY DAY, Disp: 90 tablet, Rfl: 1  No Known Allergies    Vitals: Blood pressure 112/80, pulse 80, temperature 97 5 °F (36 4 °C), temperature source Tympanic, height 5' 2" (1 575 m), weight 59 2 kg (130 lb 9 6 oz), SpO2 94 %  Body mass index is 23 89 kg/m²  Oxygen Therapy  SpO2: 94 %  Oxygen Therapy: None (Room air)    Physical Exam  Physical Exam   Constitutional: He is oriented to person, place, and time  He appears well-developed and well-nourished  No distress  HENT:   Head: Normocephalic and atraumatic  Eyes: Pupils are equal, round, and reactive to light  EOM are normal    Neck: Normal range of motion  Neck supple  No JVD present  Cardiovascular: Normal rate, regular rhythm and normal heart sounds  Exam reveals no friction rub  No murmur heard  Pulmonary/Chest: Effort normal  No stridor  No respiratory distress  He has no wheezes  He has rales  Barrel chest   Abdominal: Soft   He exhibits no distension  Musculoskeletal: Normal range of motion  He exhibits no edema or deformity  Neurological: He is alert and oriented to person, place, and time  Skin: Skin is warm  He is not diaphoretic  Psychiatric: He has a normal mood and affect  Labs: I have personally reviewed pertinent lab results  Lab Results   Component Value Date    WBC 6 46 09/03/2019    HGB 12 7 09/03/2019    HCT 40 6 09/03/2019     (H) 09/03/2019     09/03/2019     Lab Results   Component Value Date    GLUCOSE 90 08/17/2015    CALCIUM 9 3 09/03/2019     08/17/2015    K 4 3 09/03/2019    CO2 29 09/03/2019     09/03/2019    BUN 15 09/03/2019    CREATININE 1 09 09/03/2019     No results found for: IGE  Lab Results   Component Value Date    ALT 22 02/11/2019    AST 20 02/11/2019    ALKPHOS 78 02/11/2019    BILITOT 0 60 08/17/2015       Imaging and other studies:   I have personally reviewed pertinent imaging studies in PACS  6MW test 05/2019  Patient perform 6 minutes walk study  Crow Tucker had started heart rate of 100 with SpO2 of 93%   He ambulated 126 meters and SpO2 fell to 87% with heart rate of 102   Patient was placed on 3 liters/minute nasal cannula and his oxygen saturations improved to 97%   Patient was able to ambulate 441 meters   Final heart rate of 104 beats per minute, and SPO2 97% on 3 LPM NC  Echo 08/2017  LEFT VENTRICLE: Size was normal  Systolic function was moderately reduced  Ejection fraction was estimated to be 35 %  There was moderate diffuse hypokinesis with regional variations  Wall thickness was normal  DOPPLER: There was an increased relative contribution of atrial contraction to ventricular filling  Doppler parameters were consistent with abnormal left ventricular relaxation (grade 1 diastolic dysfunction)  VENTRICULAR SEPTUM: There was dyssynergic motion  These changes are consistent with LBBB    RIGHT VENTRICLE: The size was normal  Systolic function was normal  Wall thickness was normal   LEFT ATRIUM: Size was normal   RIGHT ATRIUM: Size was normal   MITRAL VALVE: Valve structure was normal  There was normal leaflet separation  DOPPLER: The transmitral velocity was within the normal range  There was no evidence for stenosis  There was trace regurgitation  AORTIC VALVE: The valve was trileaflet  Leaflets exhibited normal thickness and normal cuspal separation  DOPPLER: Transaortic velocity was within the normal range  There was no evidence for stenosis  There was trace regurgitation  TRICUSPID VALVE: The valve structure was normal  There was normal leaflet separation  DOPPLER: The transtricuspid velocity was within the normal range  There was no evidence for stenosis  There was trace regurgitation  Pulmonary artery  systolic pressure was within the normal range  Estimated peak PA pressure was 20 mmHg  PULMONIC VALVE: Leaflets exhibited normal thickness, no calcification, and normal cuspal separation  DOPPLER: The transpulmonic velocity was within the normal range  There was trace regurgitation  PERICARDIUM: There was no pericardial effusion  The pericardium was normal in appearance  AORTA: The root exhibited normal size      SYSTEMIC VEINS: IVC: The inferior vena cava was normal in size   Respirophasic changes were normal

## 2019-09-24 ENCOUNTER — TELEPHONE (OUTPATIENT)
Dept: UROLOGY | Facility: MEDICAL CENTER | Age: 64
End: 2019-09-24

## 2019-09-24 NOTE — TELEPHONE ENCOUNTER
Spoke with patient and scheduled for tomorrow with Dr Costello Minus  There was a cancellation  Gave patient the office address

## 2019-09-24 NOTE — TELEPHONE ENCOUNTER
Please Triage - Renal Cysts -CT  New Patient - Thien    What is the reason for the patients appointment? KIDNEYS/URETERS:  No hydronephrosis  One or more sharply circumscribed subcentimeter renal hypodensities are noted  These lesions are too small to accurately characterize, but are statistically most likely to represent benign cortical renal cyst(s)  According to the guidelines published in   the [facility] Paper of the ACR Incidental Findings Committee (Radiology 2010), no further workup of these lesions is recommended  Do we accept the patient's insurance or is the patient Self-Pay? Medicare   [employer]   ID# [id number]   Group # [id number]     Has the patient had any previous urologist(s)? No     Has the patients records been requested? Records available in Epic     Has the patient had any outside testing done? CT results in Epic under imaging     What is the patients location preference for an office visit? [place]     Does the patient have a personal history of cancer?   No     Patient can be reached at [phone]

## 2019-09-24 NOTE — TELEPHONE ENCOUNTER
Renal Cysts are non urgent  Please schedule patient first available based on location preference, ok to schedule with advanced practitioner

## 2019-09-25 ENCOUNTER — CONSULT (OUTPATIENT)
Dept: UROLOGY | Facility: AMBULATORY SURGERY CENTER | Age: 64
End: 2019-09-25
Payer: COMMERCIAL

## 2019-09-25 VITALS
BODY MASS INDEX: 23.7 KG/M2 | HEIGHT: 62 IN | DIASTOLIC BLOOD PRESSURE: 70 MMHG | HEART RATE: 74 BPM | WEIGHT: 128.8 LBS | SYSTOLIC BLOOD PRESSURE: 104 MMHG

## 2019-09-25 DIAGNOSIS — N32.89 BLADDER MASS: Primary | ICD-10-CM

## 2019-09-25 PROCEDURE — 99204 OFFICE O/P NEW MOD 45 MIN: CPT | Performed by: UROLOGY

## 2019-09-25 NOTE — PROGRESS NOTES
9/25/2019    Felicia Griggs Jr   1955  [id number]        Assessment  Abnormal papillary lesion of the bladder    Plan  We discussed the findings and his history  He is certainly at risk for urothelial malignancy  We reviewed his CT scan images in detail in the office today  Small area on the floor is concerning for malignancy  I cannot rule this out without direct visualization with cystoscopy  The papillary area in the central portion of the bladder neck is most likely a median lobe of the prostate  This also can be confirmed with direct visualization  He is somewhat reluctant, but agrees to proceed  Will set up office cystoscopy  All questions answered to his satisfaction  History of Present Illness  Delia Pate is a 60 y o  male referred for evaluation of abnormal CT finding  Patient has severe COPD as well as congestive heart failure due to extensive smoking history  He had reported recent weight loss and was sent for CT scan for further evaluation  He denies any urinary symptoms  He has never had weak urinary stream, difficulty emptying, urgency, frequency  He denies any prior urinary tract infection or kidney stones  He has not had any hematuria  CT scan reveals a 10 mm papillary area on the floor the bladder as well as a 14 mm papillary area that emanates from the central portion of the bladder neck and prostate  PSA 1 6      AUA SYMPTOM SCORE      Most Recent Value   AUA SYMPTOM SCORE   How often have you had a sensation of not emptying your bladder completely after you finished urinating? 0   How often have you had to urinate again less than two hours after you finished urinating? 1   How often have you found you stopped and started again several times when you urinate?  0   How often have you found it difficult to postpone urination? 0   How often have you had a weak urinary stream?  0   How often have you had to push or strain to begin urination?   0   How many times did you most typically get up to urinate from the time you went to bed at night until the time you got up in the morning? 4   Quality of Life: If you were to spend the rest of your life with your urinary condition just the way it is now, how would you feel about that?  2   AUA SYMPTOM SCORE  5          Review of Systems  Review of Systems   Constitutional: Negative  HENT: Negative  Respiratory: Negative  Cardiovascular: Negative  Gastrointestinal: Negative  Genitourinary:        As per HPI   Musculoskeletal: Negative  Skin: Negative  Neurological: Negative  Hematological: Negative            Past Medical History  Past Medical History:   Diagnosis Date    [facility]omyopathy [facility])     Chest pain     [facility])     Emphysema of lung (HCC)     Hypoxia     nocturnal    Left bundle branch block     Multiple pulmonary nodules     last assessed: 10/12/16    Smoker        Past Social History  Past Surgical History:   Procedure Laterality Date    CARDIAC CATHETERIZATION         Past Family History  Family History   Problem Relation Age of Onset    Diabetes Mother        Past Social history  Social History     Socioeconomic History    Marital status: /Civil Union     Spouse name: Not on file    Number of children: Not on file    Years of education: Not on file    Highest education level: Not on file   Occupational History    Not on file   Social Needs    Financial resource strain: Not on file    Food insecurity:     Worry: Not on file     Inability: Not on file    Transportation needs:     Medical: Not on file     Non-medical: Not on file   Tobacco Use    Smoking status: Former Smoker     Packs/day: 2 50     Years: 37 00     Pack years: 92 50     Types: Cigarettes     Start date:      Last attempt to quit: 2012     Years since quittin 7    Smokeless tobacco: Former User    Tobacco comment: 1 ppd for 37 years, 2010 down to 5 cigs a day, is around second hand smoke   Substance and Sexual Activity    Alcohol use: Not Currently     Comment: rarely    Drug use: No    Sexual activity: Not on file   Lifestyle    Physical activity:     Days per week: Not on file     Minutes per session: Not on file    Stress: Not on file   Relationships    Social connections:     Talks on phone: Not on file     Gets together: Not on file     Attends [employer] service: Not on file     Active member of club or organization: Not on file     Attends meetings of clubs or organizations: Not on file     Relationship status: Not on file    Intimate partner violence:     Fear of current or ex partner: Not on file     Emotionally abused: Not on file     Physically abused: Not on file     Forced sexual activity: Not on file   Other Topics Concern    Not on file   Social History Narrative    Daily coffee consumption: 8 or more cups a day         Social History     Tobacco Use   Smoking Status Former Smoker    Packs/day: 2 50    Years: 37 00    Pack years: 92 50    Types: Cigarettes    Start date:     Last attempt to quit:     Years since quittin 7   Smokeless Tobacco Former User   Tobacco Comment    1 ppd for 37 years,  down to 5 cigs a day, is around second hand smoke       Current Medications  Current Outpatient Medications   Medication Sig Dispense Refill    albuterol (2 5 mg/3 mL) 0 083 % nebulizer solution Take 1 vial (2 5 mg total) by nebulization 4 (four) times a day 120 vial 5    albuterol (VENTOLIN HFA) 90 mcg/act inhaler Inhale 2 puffs every 6 (six) hours as needed for wheezing 1 Inhaler 6    alendronate (FOSAMAX) 70 mg tablet Take 1 tablet (70 mg total) by mouth every 7 days 12 tablet 3    aspirin 81 MG tablet Take 1 tablet by mouth daily      Cholecalciferol (VITAMIN D) 2000 units CAPS Take by mouth      fluticasone-umeclidinium-vilanterol (TRELEGY ELLIPTA) 100-62 5-25 MCG/INH inhaler Inhale 1 puff daily Rinse mouth after use   1 Inhaler 11    ipratropium (ATROVENT) 0 02 % nebulizer solution Take 1 vial (0 5 mg total) by nebulization 4 (four) times a day 120 vial 5    pantoprazole (PROTONIX) 40 mg tablet Take by mouth Daily      predniSONE 10 mg tablet Take 2 tablets by mouth daily      rosuvastatin (CRESTOR) 10 MG tablet TAKE 1 TABLET EVERY DAY 90 tablet 1    azithromycin (ZITHROMAX) 250 mg tablet Take 1 tablet (250 mg total) by mouth every 24 hours for 210 days (Patient not taking: Reported on 9/25/2019) 30 tablet 6     No current facility-administered medications for this visit  Allergies  No Known Allergies    Past Medical History, Social History, Family History, medications and allergies were reviewed  Vitals  Vitals:    09/25/19 1518   BP: 104/70   BP Location: Left arm   Patient Position: Sitting   Cuff Size: Adult   Pulse: 74   Weight: 58 4 kg (128 lb 12 8 oz)   Height: 5' 2" (1 575 m)       Physical Exam  Physical Exam   Constitutional: He is oriented to person, place, and time  He appears well-developed and well-nourished  Cardiovascular: Normal rate  Pulmonary/Chest: Effort normal    Abdominal: Soft  Genitourinary:   Genitourinary Comments: No CVA tenderness  Patient declined prostate examination  Musculoskeletal: Normal range of motion  Neurological: He is alert and oriented to person, place, and time  Skin: Skin is warm, dry and intact  Psychiatric: He has a normal mood and affect  Vitals reviewed          Results  Lab Results   Component Value Date    PSA 1 6 08/05/2019    PSA 1 8 08/21/2017    PSA 2 3 08/15/2016     Lab Results   Component Value Date    GLUCOSE 90 08/17/2015    CALCIUM 9 3 09/03/2019     08/17/2015    K 4 3 09/03/2019    CO2 29 09/03/2019     09/03/2019    BUN 15 09/03/2019    CREATININE 1 09 09/03/2019     Lab Results   Component Value Date    WBC 6 46 09/03/2019    HGB 12 7 09/03/2019    HCT 40 6 09/03/2019     (H) 09/03/2019     09/03/2019

## 2019-11-07 ENCOUNTER — OFFICE VISIT (OUTPATIENT)
Dept: CARDIOLOGY CLINIC | Facility: CLINIC | Age: 64
End: 2019-11-07
Payer: COMMERCIAL

## 2019-11-07 VITALS
WEIGHT: 133 LBS | HEIGHT: 62 IN | SYSTOLIC BLOOD PRESSURE: 100 MMHG | BODY MASS INDEX: 24.48 KG/M2 | DIASTOLIC BLOOD PRESSURE: 62 MMHG | OXYGEN SATURATION: 94 % | HEART RATE: 86 BPM

## 2019-11-07 DIAGNOSIS — I42.0 CARDIOMYOPATHY, DILATED (HCC): Primary | ICD-10-CM

## 2019-11-07 DIAGNOSIS — E78.00 HYPERCHOLESTEROLEMIA: ICD-10-CM

## 2019-11-07 DIAGNOSIS — I25.10 CORONARY ARTERY DISEASE INVOLVING NATIVE CORONARY ARTERY OF NATIVE HEART WITHOUT ANGINA PECTORIS: ICD-10-CM

## 2019-11-07 DIAGNOSIS — R00.2 PALPITATIONS: ICD-10-CM

## 2019-11-07 DIAGNOSIS — I44.7 LEFT BUNDLE-BRANCH BLOCK: ICD-10-CM

## 2019-11-07 DIAGNOSIS — I50.22 CHRONIC SYSTOLIC CONGESTIVE HEART FAILURE (HCC): ICD-10-CM

## 2019-11-07 PROCEDURE — 99214 OFFICE O/P EST MOD 30 MIN: CPT | Performed by: INTERNAL MEDICINE

## 2019-11-07 NOTE — PROGRESS NOTES
Cardiology Follow Up    Silvia Sykes   1955  [id number]  [address] [facility]  [address] [place] [address] [address]    1  Cardiomyopathy, dilated ([address] )  Echo complete with contrast if indicated   2  Chronic systolic congestive heart failure (HCC)  Echo complete with contrast if indicated   3  Left bundle-branch block     4  Coronary artery disease involving native coronary artery of native heart without angina pectoris     5  Palpitations  Holter monitor - 24 hour   6  Hypercholesterolemia         Interval History:     Lawrence Luther returns for followup given his history of a dilated cardiomyopathy and chronic systolic congestive heart failure, at least partially associated with a chronic left bundle branch block  Over the few years his ejection fraction has been hovering around 40%  He has had, however, significant lung disease, and follows with Dr Dariana Nolan of pulmonary  Most of his symptoms appeared to be pulmonary related, but given his cardiomyopathy and the symptoms we entertained the idea of a biventricular ICD  He declined, but I also felt it would provide little clinical benefit  He follows with pulmonary closely who has been titrating steroid therapy for his lung disease  He is on minimal medications from a cardiac standpoint  His cardiomyopathy medications he did not tolerate, as he became quite hypotensive  His last few echocardiograms have demonstrated ejection fraction of around 35 - 40%  Lawrence Luther overall feels about the same  He continues to have shortness of breath, but this has not changed  He denies any chest pain or any symptoms suggestive of angina  He denies any orthopnea or PND  He denies any changes in weight or any increasing lower extremity edema  Lawrence Luther has noticed some intermittent palpitations, particularly later in the day when at rest or lying down    He states it is an intermittent flutter, and this seems to be describing symptomatic PVCs or PACs        Patient Active Problem List   Diagnosis    Coronary artery disease    Cardiomyopathy, dilated ([place] [address] )    Stage 3 severe COPD by GOLD classification ([place] [address] )    Chronic systolic congestive heart failure ([place] [address] )    Left bundle-branch block    Obstructive sleep apnea    Hypercholesterolemia    Gastroesophageal reflux disease    Impaired fasting glucose    Osteoarthritis    Osteoporosis    Vitamin D deficiency    Mood disorder ([place] [address] )    Incidental lung nodule, less than or equal to 3mm    Other insomnia    Macrocytosis without anemia    Abnormal CBC    Weight loss    Bladder mass     Past Medical History:   Diagnosis Date    Cardiomyopathy ([place] [address] )     Chest pain     COPD (chronic obstructive pulmonary disease) ([facility])     Emphysema of lung ([facility])     Hypoxia     nocturnal    Left bundle branch block     Multiple pulmonary nodules     last assessed: 10/12/16    Smoker      Social History     Socioeconomic History    Marital status: /Civil Union     Spouse name: Not on file    Number of children: Not on file    Years of education: Not on file    Highest education level: Not on file   Occupational History    Not on file   Social Needs    Financial resource strain: Not on file    Food insecurity:     Worry: Not on file     Inability: Not on file    Transportation needs:     Medical: Not on file     Non-medical: Not on file   Tobacco Use    Smoking status: Former Smoker     Packs/day: 2 50     Years: 37 00     Pack years: 92 50     Types: Cigarettes     Start date:      Last attempt to quit:      Years since quittin 8    Smokeless tobacco: Former User    Tobacco comment: 1 ppd for 37 years, 2010 down to 5 cigs a day, is around second hand smoke   Substance and Sexual Activity    Alcohol use: Not Currently     Comment: rarely    Drug use: No    Sexual activity: Not on file   Lifestyle    Physical activity:     Days per week: Not on file     Minutes per session: Not on file    Stress: Not on file   Relationships    Social connections:     Talks on phone: Not on file     Gets together: Not on file     Attends [employer] service: Not on file     Active member of club or organization: Not on file     Attends meetings of clubs or organizations: Not on file     Relationship status: Not on file    Intimate partner violence:     Fear of current or ex partner: Not on file     Emotionally abused: Not on file     Physically abused: Not on file     Forced sexual activity: Not on file   Other Topics Concern    Not on file   Social History Narrative    Daily coffee consumption: 8 or more cups a day          Family History   Problem Relation Age of Onset    Diabetes Mother      Past Surgical History:   Procedure Laterality Date    CARDIAC CATHETERIZATION         Current Outpatient Medications:     albuterol (2 5 mg/3 mL) 0 083 % nebulizer solution, Take 1 vial (2 5 mg total) by nebulization 4 (four) times a day, Disp: 120 vial, Rfl: 5    albuterol (VENTOLIN HFA) 90 mcg/act inhaler, Inhale 2 puffs every 6 (six) hours as needed for wheezing, Disp: 1 Inhaler, Rfl: 6    alendronate (FOSAMAX) 70 mg tablet, Take 1 tablet (70 mg total) by mouth every 7 days, Disp: 12 tablet, Rfl: 3    aspirin 81 MG tablet, Take 1 tablet by mouth daily, Disp: , Rfl:     Cholecalciferol (VITAMIN D) 2000 units CAPS, Take by mouth, Disp: , Rfl:     fluticasone-umeclidinium-vilanterol (TRELEGY ELLIPTA) 100-62 5-25 MCG/INH inhaler, Inhale 1 puff daily Rinse mouth after use , Disp: 1 Inhaler, Rfl: 11    ipratropium (ATROVENT) 0 02 % nebulizer solution, Take 1 vial (0 5 mg total) by nebulization 4 (four) times a day, Disp: 120 vial, Rfl: 5    pantoprazole (PROTONIX) 40 mg tablet, Take by mouth Daily, Disp: , Rfl:     predniSONE 10 mg tablet, Take 2 tablets by mouth daily, Disp: , Rfl:     rosuvastatin (CRESTOR) 10 MG tablet, TAKE 1 TABLET EVERY DAY, Disp: 90 tablet, Rfl: 1    azithromycin (ZITHROMAX) 250 mg tablet, Take 1 tablet (250 mg total) by mouth every 24 hours for 210 days (Patient not taking: Reported on 9/25/2019), Disp: 30 tablet, Rfl: 6  No Known Allergies    Labs:  Lab Results   Component Value Date     08/17/2015    K 4 3 09/03/2019    K 3 9 08/17/2015     09/03/2019     (H) 08/17/2015    CO2 29 09/03/2019    CO2 30 08/17/2015    BUN 15 09/03/2019    BUN 13 08/17/2015    CREATININE 1 09 09/03/2019    CREATININE 1 04 08/17/2015    GLUCOSE 90 08/17/2015    CALCIUM 9 3 09/03/2019    CALCIUM 8 8 08/17/2015     Imaging:  ECG today reveals sinus rhythm with a left bundle branch block  ECHO (8/17):  LEFT VENTRICLE:  Systolic function was moderately reduced  Ejection fraction was estimated to be 35 %  There was moderate diffuse hypokinesis with regional variations  Doppler parameters were consistent with abnormal left ventricular relaxation (grade 1 diastolic dysfunction)      VENTRICULAR SEPTUM:  There was dyssynergic motion  These changes are consistent with LBBB  Review of Systems:  Review of Systems   Constitutional: Positive for fatigue  HENT: Negative  Eyes: Negative  Respiratory: Positive for shortness of breath  Cardiovascular: Positive for palpitations  Gastrointestinal: Negative  Musculoskeletal: Negative  Skin: Negative  Allergic/Immunologic: Negative  Neurological: Negative  Hematological: Negative  Psychiatric/Behavioral: Negative  All other systems reviewed and are negative  Vitals:    11/07/19 1226 11/07/19 1230   BP: 100/62    BP Location: Right arm    Patient Position: Sitting    Cuff Size: Standard    Pulse: 86    SpO2: (!) 89% 94%   Weight: 60 3 kg (133 lb)    Height: 5' 2" (1 575 m)      Physical Exam:  Physical Exam   Constitutional: He is oriented to person, place, and time  He appears well-developed and well-nourished  HENT:   Head: Normocephalic and atraumatic  Eyes: Pupils are equal, round, and reactive to light  EOM are normal  Right eye exhibits no discharge  Left eye exhibits no discharge  No scleral icterus  Neck: Normal range of motion  Neck supple  No JVD present  No thyromegaly present  Cardiovascular: Normal rate, regular rhythm, S1 normal, S2 normal, intact distal pulses and normal pulses  No extrasystoles are present  Exam reveals distant heart sounds  Exam reveals no gallop and no friction rub  No murmur heard  Pulmonary/Chest: Effort normal  No respiratory distress  He has decreased breath sounds  He has no wheezes  He has no rales  Abdominal: Soft  Bowel sounds are normal  He exhibits no distension  There is no tenderness  Musculoskeletal: Normal range of motion  He exhibits no edema, tenderness or deformity  Neurological: He is alert and oriented to person, place, and time  No cranial nerve deficit  Skin: Skin is warm and dry  No rash noted  Psychiatric: He has a normal mood and affect  Judgment and thought content normal    Nursing note and vitals reviewed  Discussion/Summary:    1  Chronic respiratory failure - This appears to be more pulmonary related, but at least has a combination of both COPD and systolic CHF  Orterri Cole was started on home oxygen since I last saw him, and he does wear CPAP at night for sleep apnea  He will continue to follow with Pulmonary closely  2   Chronic systolic CHF - This is secondary to a dilated cardiomyopathy and a left bundle branch block  Ejection fractions appear to range between 35 and 40%  We have had the discussion of a biventricular ICD, which I do feel would provide little if any benefit  Also his ejection fractions have been over 35% on a few occasions  No changes were made to his medical therapy  He should continue to watch his sodium intake and weight, for any signs of volume overload  We did order an updated echocardiogram today  We will see him back in 6 months      3   Dilated cardiomyopathy - Ejection fraction around 35-40%  He has been unable to tolerate goal-directed medical therapy due to hypotension  We did order an updated echocardiogram as stated  4  CAD - Nonobstructive by cardiac catheterization  Continue medical management  Patient is on simvastatin for dyslipidemia  5   Palpitations - Daysi Car appears to be describing symptomatic PVCs or PACs  He gets some on a regular basis, specifically later in the day with lying down and at rest   We also ordered a 24 hour Holter monitor  Counseling / Coordination of Care  Total office time spent today 25 minutes  Greater than 50% of total time was spent with the patient and / or family counseling and / or coordination of care

## 2019-11-07 NOTE — PATIENT INSTRUCTIONS
Low-Sodium Diet   AMBULATORY CARE:   A low-sodium diet  limits foods that are high in sodium (salt)  You will need to follow a low-sodium diet if you have high blood pressure, kidney disease, or heart failure  You may also need to follow this diet if you have a condition that is causing your body to retain (hold) extra fluid  You may need to limit the amount of sodium you eat to 1,500 mg  Ask your healthcare provider how much sodium you can have each day  How to use food labels to choose foods that are low in sodium:  Read food labels to find the amount of sodium they contain  The amount of sodium is listed in milligrams (mg)  The % Daily Value (DV) column tells you how much of your daily needs are met by 1 serving of the food for each nutrient listed  Choose foods that have less than 5% of the DV of sodium  These foods are considered low in sodium  Foods that have 20% or more of the DV of sodium are considered high in sodium  Some food labels may also list any of the following terms that tell you about the sodium content in the food:  · Sodium-free:  Less than 5 mg in each serving    · Very low sodium:  35 mg of sodium or less in each serving    · Low sodium:  140 mg of sodium or less in each serving    · Reduced sodium: At least 25% less sodium in each serving than the regular type    · Light in sodium:  50% less sodium in each serving    · Unsalted or no added salt:  No extra salt is added during processing (the food may still contain sodium)  Foods to avoid:  Salty foods are high in sodium   You should avoid the following:  · Processed foods:      ¨ Mixes for cornbread, biscuits, cake, and pudding     ¨ Instant foods, such as potatoes, cereals, noodles, and rice     ¨ Packaged foods, such as bread stuffing, rice and pasta mixes, snack dip mixes, and macaroni and cheese     ¨ Canned foods, such as canned vegetables, soups, broths, sauces, and vegetable or tomato juice    ¨ Snack foods, such as salted chips, popcorn, pretzels, pork rinds, salted crackers, and salted nuts    ¨ Frozen foods, such as dinners, entrees, vegetables with sauces, and breaded meats    ¨ Sauerkraut, pickled vegetables, and other foods prepared in brine    · Meats and cheeses:      ¨ Smoked or cured meat, such as corned beef, jean, ham, hot dogs, and sausage    ¨ Canned meats or spreads, such as potted meats, sardines, anchovies, and imitation seafood    ¨ Deli or lunch meats, such as bologna, ham, turkey, and roast beef    ¨ Processed cheese, such as American cheese and cheese spreads    · Condiments, sauces, and seasonings:      ¨ Salt (¼ teaspoon of salt contains 575 mg of sodium)    ¨ Seasonings made with salt, such as garlic salt, celery salt, onion salt, and seasoned salt    ¨ Regular soy sauce, barbecue sauce, teriyaki sauce, steak sauce, Worcestershire sauce, and most flavored vinegars    ¨ Canned gravy and mixes     ¨ Regular condiments, such as mustard, ketchup, and salad dressings    ¨ Pickles and olives    ¨ Meat tenderizers and monosodium glutamate (MSG)  Foods to include:  Read the food label to find the exact amount of sodium in each serving  · Bread and cereal:  Try to choose breads with less than 80 mg of sodium per serving  ¨ Bread, roll, mario, tortilla, or unsalted crackers  ¨ Ready-to-eat cereals with less than 5% DV of sodium (examples include shredded wheat and puffed rice)    ¨ Pasta    · Vegetables and fruits:      ¨ Unsalted fresh, frozen, or canned vegetables    ¨ Fresh, frozen, or canned fruits    ¨ Fruit juice    · Dairy:  One serving has about 150 mg of sodium  ¨ Milk, all types    ¨ Yogurt    ¨ Hard cheese, such as cheddar, Swiss, [employer], or mozzarella    · Meat and other protein foods:  Some raw meats may have added sodium       ¨ Plain meats, fish, and poultry     ¨ Eggs    · Other foods:      ¨ Homemade pudding    ¨ Unsalted nuts, popcorn, or pretzels    ¨ Unsalted butter or margarine  Ways to decrease sodium:   · Add spices and herbs to foods instead of salt during cooking  Use salt-free seasonings to add flavor to foods  Examples include onion powder, garlic powder, basil, goodrich powder, paprika, and parsley  Try lemon or lime juice or vinegar to give foods a tart flavor  Use hot peppers, pepper, or cayenne pepper to add a spicy flavor to foods  · Do not keep a salt shaker at your kitchen table  This may help keep you from adding salt to food at the table  It may take time to get used to enjoying the natural flavor of food instead of adding salt  Talk to your healthcare provider before you use salt substitutes  Some salt substitutes have a high amount of potassium and need to be avoided if you have kidney disease  · Choose low-sodium foods at restaurants  Meals from restaurants are often high in sodium  Some restaurants have nutrition information on the menu that tells you the amount of sodium in their foods  If possible, ask for your food to be prepared with less, or no salt  · Shop for unsalted or low-sodium foods and snacks at the grocery store  Examples include unsalted or low-sodium broths, soups, and canned vegetables  Choose fresh or frozen vegetables instead  Choose unsalted nuts or seeds or fresh fruits or vegetables as snacks  Read food labels and choose salt-free, very low-sodium, or low-sodium foods  © 2017 [address] Information is for End User's use only and may not be sold, redistributed or otherwise used for commercial purposes  All illustrations and images included in CareNotes® are the copyrighted property of [employer]  or [employer]avan[employer]  The above information is an  only  It is not intended as medical advice for individual conditions or treatments  Talk to your doctor, nurse or pharmacist before following any medical regimen to see if it is safe and effective for you

## 2019-11-15 ENCOUNTER — PROCEDURE VISIT (OUTPATIENT)
Dept: UROLOGY | Facility: AMBULATORY SURGERY CENTER | Age: 64
End: 2019-11-15
Payer: COMMERCIAL

## 2019-11-15 ENCOUNTER — TELEPHONE (OUTPATIENT)
Dept: UROLOGY | Facility: AMBULATORY SURGERY CENTER | Age: 64
End: 2019-11-15

## 2019-11-15 VITALS
WEIGHT: 135 LBS | HEART RATE: 85 BPM | BODY MASS INDEX: 24.84 KG/M2 | SYSTOLIC BLOOD PRESSURE: 110 MMHG | HEIGHT: 62 IN | DIASTOLIC BLOOD PRESSURE: 82 MMHG

## 2019-11-15 DIAGNOSIS — IMO0001: ICD-10-CM

## 2019-11-15 DIAGNOSIS — N32.89 BLADDER MASS: Primary | ICD-10-CM

## 2019-11-15 LAB
SL AMB  POCT GLUCOSE, UA: NORMAL
SL AMB LEUKOCYTE ESTERASE,UA: NORMAL
SL AMB POCT BILIRUBIN,UA: NORMAL
SL AMB POCT BLOOD,UA: NORMAL
SL AMB POCT CLARITY,UA: CLEAR
SL AMB POCT COLOR,UA: YELLOW
SL AMB POCT KETONES,UA: NORMAL
SL AMB POCT NITRITE,UA: NORMAL
SL AMB POCT PH,UA: 6
SL AMB POCT SPECIFIC GRAVITY,UA: 1.01
SL AMB POCT URINE PROTEIN: NORMAL
SL AMB POCT UROBILINOGEN: 0.2

## 2019-11-15 PROCEDURE — 88112 CYTOPATH CELL ENHANCE TECH: CPT | Performed by: PATHOLOGY

## 2019-11-15 PROCEDURE — 81002 URINALYSIS NONAUTO W/O SCOPE: CPT | Performed by: UROLOGY

## 2019-11-15 PROCEDURE — 52000 CYSTOURETHROSCOPY: CPT | Performed by: UROLOGY

## 2019-11-15 PROCEDURE — 99214 OFFICE O/P EST MOD 30 MIN: CPT | Performed by: UROLOGY

## 2019-11-15 RX ORDER — CEFAZOLIN SODIUM 1 G/50ML
1000 SOLUTION INTRAVENOUS ONCE
Status: CANCELLED | OUTPATIENT
Start: 2019-11-15 | End: 2019-11-15

## 2019-11-15 RX ORDER — ERGOCALCIFEROL 1.25 MG/1
1.25 CAPSULE ORAL DAILY
Refills: 3 | Status: ON HOLD | COMMUNITY
Start: 2019-11-09 | End: 2020-01-03

## 2019-11-15 NOTE — LETTER
November 15, 2019     Chin Castro DO  [address]  [address] Floor, [address] [facility],6[address]    Patient: aKrsten Wright  YOB: 1955   Date of Visit: 11/15/2019       Dear Dr Stephen Parham: Thank you for referring Kaitlin Blair to me for evaluation  Below are my notes for this consultation  If you have questions, please do not hesitate to call me  I look forward to following your patient along with you  Sincerely,        Aden Jean Baptiste MD        CC: DO Aden Omer MD  11/15/2019  8:41 AM  Sign at close encounter  11/15/2019    Karsten Wright   1955  [id number]        Assessment  Urothelial carcinoma of the bladder    Plan  We discussed findings today  He will require trans urethral resection of bladder tumor in the operating room  Technique, risks, benefits were discussed  We also discussed mitomycin instillation after the procedure to prevent recurrence as recurrence is extremely common  All questions answered to her satisfaction  Consent was obtained for cystoscopy, transurethral resection bladder tumor, mitomycin instillation  Total visit time was 25 minutes of which over 50% was spent on counseling  History of Present Illness  Jim is a 60 y o  male with significant smoking history and severe COPD and congestive heart failure  He had a CT scan performed because of recent weight loss  Papillary lesions of the bladder were identified  He presents today for further evaluation  He denies any significant urinary complaints or symptoms  He has not had hematuria  Cystoscopy  Date/Time: 11/15/2019 8:39 AM  Performed by: Aden Jean Baptiste MD  Authorized by: Aden Jean Baptiste MD     Additional Procedure Details: Flexible cystoscopy note     The patient was prepped and draped in sterile fashion  2% lidocaine jelly was instilled per urethra   The 16 British flexible cystoscope was placed per urethra    Evaluation of the bladder reveals bilateral normal ureteral orifices   There is a 2 cm papillary lesion that appears superficial, posterior to the left ureteral orifice  There is trabeculation as well as a pseudodiverticulum at the dome  There is an intravesical component of the prostate with a median lobe portion  Dustin Fellers scope was removed and the procedure was terminated  The patient tolerated the procedure well  Review of Systems  Review of Systems   Constitutional: Negative  HENT: Negative  Respiratory: Negative  Cardiovascular: Negative  Gastrointestinal: Negative  Genitourinary:        As per HPI   Musculoskeletal: Negative  Skin: Negative  Neurological: Negative  Hematological: Negative            Past Medical History  Past Medical History:   Diagnosis Date    Cardiomyopathy [facility])     Chest pain     [facility]     Emphysema of lung (HCC)     Hypoxia     nocturnal    Left bundle branch block     Multiple pulmonary nodules     last assessed: 10/12/16    Smoker        Past Social History  Past Surgical History:   Procedure Laterality Date    CARDIAC CATHETERIZATION      CYSTOSCOPY         Past Family History  Family History   Problem Relation Age of Onset    Diabetes Mother        Past Social history  Social History     Socioeconomic History    Marital status: /Civil Union     Spouse name: Not on file    Number of children: Not on file    Years of education: Not on file    Highest education level: Not on file   Occupational History    Not on file   Social Needs    Financial resource strain: Not on file    Food insecurity:     Worry: Not on file     Inability: Not on file    Transportation needs:     Medical: Not on file     Non-medical: Not on file   Tobacco Use    Smoking status: Former Smoker     Packs/day: 2 50     Years: 37 00     Pack years: 92 50     Types: Cigarettes     Start date:      Last attempt to quit:      Years since quittin 8  Smokeless tobacco: Former User    Tobacco comment: 1 ppd for 37 years,  down to 5 cigs a day, is around second hand smoke   Substance and Sexual Activity    Alcohol use: Not Currently     Comment: rarely    Drug use: No    Sexual activity: Not on file   Lifestyle    Physical activity:     Days per week: Not on file     Minutes per session: Not on file    Stress: Not on file   Relationships    Social connections:     Talks on phone: Not on file     Gets together: Not on file     Attends Confucianism service: Not on file     Active member of club or organization: Not on file     Attends meetings of clubs or organizations: Not on file     Relationship status: Not on file    Intimate partner violence:     Fear of current or ex partner: Not on file     Emotionally abused: Not on file     Physically abused: Not on file     Forced sexual activity: Not on file   Other Topics Concern    Not on file   Social History Narrative    Daily coffee consumption: 8 or more cups a day         Social History     Tobacco Use   Smoking Status Former Smoker    Packs/day: 2 50    Years: 37 00    Pack years: 92 50    Types: Cigarettes    Start date:     Last attempt to quit:     Years since quittin 8   Smokeless Tobacco Former User   Tobacco Comment    1 ppd for 37 years,  down to 5 cigs a day, is around second hand smoke       Current Medications  Current Outpatient Medications   Medication Sig Dispense Refill    albuterol (2 5 mg/3 mL) 0 083 % nebulizer solution Take 1 vial (2 5 mg total) by nebulization 4 (four) times a day 120 vial 5    albuterol (VENTOLIN HFA) 90 mcg/act inhaler Inhale 2 puffs every 6 (six) hours as needed for wheezing 1 Inhaler 6    alendronate (FOSAMAX) 70 mg tablet Take 1 tablet (70 mg total) by mouth every 7 days 12 tablet 3    aspirin 81 MG tablet Take 1 tablet by mouth daily      azithromycin (ZITHROMAX) 250 mg tablet Take 1 tablet (250 mg total) by mouth every 24 hours for 210 days 30 tablet 6    Cholecalciferol (VITAMIN D) 2000 units CAPS Take by mouth      ergocalciferol (VITAMIN D2) 50,000 units Take 1 25 Units by mouth daily  3    fluticasone-umeclidinium-vilanterol (TRELEGY ELLIPTA) 100-62 5-25 MCG/INH inhaler Inhale 1 puff daily Rinse mouth after use  1 Inhaler 11    ipratropium (ATROVENT) 0 02 % nebulizer solution Take 1 vial (0 5 mg total) by nebulization 4 (four) times a day 120 vial 5    pantoprazole (PROTONIX) 40 mg tablet Take by mouth Daily      predniSONE 10 mg tablet Take 2 tablets by mouth daily      rosuvastatin (CRESTOR) 10 MG tablet TAKE 1 TABLET EVERY DAY 90 tablet 1     No current facility-administered medications for this visit  Allergies  No Known Allergies    Past Medical History, Social History, Family History, medications and allergies were reviewed  Vitals  Vitals:    11/15/19 0749   BP: 110/82   BP Location: Left arm   Patient Position: Sitting   Cuff Size: Adult   Pulse: 85   Weight: 61 2 kg (135 lb)   Height: 5' 2" (1 575 m)       Physical Exam  Physical Exam   Constitutional: He is oriented to person, place, and time  He appears well-developed and well-nourished  Cardiovascular: Normal rate and regular rhythm  Pulmonary/Chest: Effort normal and breath sounds normal    Abdominal: Soft  Genitourinary:   Genitourinary Comments: Meatal stenosis   Musculoskeletal: Normal range of motion  Neurological: He is alert and oriented to person, place, and time  Skin: Skin is warm, dry and intact  Psychiatric: He has a normal mood and affect  Vitals reviewed          Results  Lab Results   Component Value Date    PSA 1 6 08/05/2019    PSA 1 8 08/21/2017    PSA 2 3 08/15/2016     Lab Results   Component Value Date    GLUCOSE 90 08/17/2015    CALCIUM 9 3 09/03/2019     08/17/2015    K 4 3 09/03/2019    CO2 29 09/03/2019     09/03/2019    BUN 15 09/03/2019    CREATININE 1 09 09/03/2019     Lab Results   Component Value Date    WBC 6 46 09/03/2019    HGB 12 7 09/03/2019    HCT 40 6 09/03/2019     (H) 09/03/2019     09/03/2019

## 2019-11-15 NOTE — TELEPHONE ENCOUNTER
Spoke with Patient's wife Jez Nina (on CC) will look for an alternative date due to Patient unavailable for date given  Looking @ possibly 1/3/20  I will discuss with Dr Amy Roberts to see if date is within reasonable limit  Wife is aware I will call back next week once I have checked with   She agreed to plan going forward

## 2019-11-15 NOTE — PROGRESS NOTES
11/15/2019    Gela Carter Jr   1955  [id number]        Assessment  Urothelial carcinoma of the bladder    Plan  We discussed findings today  He will require trans urethral resection of bladder tumor in the operating room  Technique, risks, benefits were discussed  We also discussed mitomycin instillation after the procedure to prevent recurrence as recurrence is extremely common  All questions answered to her satisfaction  Consent was obtained for cystoscopy, transurethral resection bladder tumor, mitomycin instillation  He will require clearance for this elective procedure  Total visit time was 25 minutes of which over 50% was spent on counseling  History of Present Illness  Jami Guadalupe is a 60 y o  male with significant smoking history and severe COPD and congestive heart failure  He had a CT scan performed because of recent weight loss  Papillary lesions of the bladder were identified  He presents today for further evaluation  He denies any significant urinary complaints or symptoms  He has not had hematuria  Cystoscopy  Date/Time: 11/15/2019 8:39 AM  Performed by: Kimberly Blankenship MD  Authorized by: Kimberly Blankenship MD     Additional Procedure Details: Flexible cystoscopy note     The patient was prepped and draped in sterile fashion  2% lidocaine jelly was instilled per urethra   The 16 [place] flexible cystoscope was placed per urethra  Evaluation of the bladder reveals bilateral normal ureteral orifices   There is a 2 cm papillary lesion that appears superficial, posterior to the left ureteral orifice  There is trabeculation as well as a pseudodiverticulum at the dome  There is an intravesical component of the prostate with a median lobe portion  Linell Big Indian scope was removed and the procedure was terminated  The patient tolerated the procedure well  Review of Systems  Review of Systems   Constitutional: Negative  HENT: Negative  Respiratory: Negative      Cardiovascular: Negative  Gastrointestinal: Negative  Genitourinary:        As per HPI   Musculoskeletal: Negative  Skin: Negative  Neurological: Negative  Hematological: Negative            Past Medical History  Past Medical History:   Diagnosis Date    Cardiomyopathy [facility])     Chest pain     COPD [facility]ch[facility] disease) ([facility])     Emphysema of lung ([facility])     Hypoxia     nocturnal    Left bundle branch block     Multiple pulmonary nodules     last assessed: 10/12/16    Smoker        Past Social History  Past Surgical History:   Procedure Laterality Date    CARDIAC CATHETERIZATION      CYSTOSCOPY         Past Family History  Family History   Problem Relation Age of Onset    Diabetes Mother        Past Social history  Social History     Socioeconomic History    Marital status: /Civil Union     Spouse name: Not on file    Number of children: Not on file    Years of education: Not on file    Highest education level: Not on file   Occupational History    Not on file   Social Needs    Financial resource strain: Not on file    Food insecurity:     Worry: Not on file     Inability: Not on file    Transportation needs:     Medical: Not on file     Non-medical: Not on file   Tobacco Use    Smoking status: Former Smoker     Packs/day: 2 50     Years: 37 00     Pack years: 92 50     Types: Cigarettes     Start date:      Last attempt to quit:      Years since quittin 8    Smokeless tobacco: Former User    Tobacco comment: 1 ppd for 37 years, 2010 down to 5 cigs a day, is around second hand smoke   Substance and Sexual Activity    Alcohol use: Not Currently     Comment: rarely    Drug use: No    Sexual activity: Not on file   Lifestyle    Physical activity:     Days per week: Not on file     Minutes per session: Not on file    Stress: Not on file   Relationships    Social connections:     Talks on phone: Not on file     Gets together: Not on file     Attends Islam service: Not on file     Active member of club or organization: Not on file     Attends meetings of clubs or organizations: Not on file     Relationship status: Not on file    Intimate partner violence:     Fear of current or ex partner: Not on file     Emotionally abused: Not on file     Physically abused: Not on file     Forced sexual activity: Not on file   Other Topics Concern    Not on file   Social History Narrative    Daily coffee consumption: 8 or more cups a day         Social History     Tobacco Use   Smoking Status Former Smoker    Packs/day: 2 50    Years: 37 00    Pack years: 92 50    Types: Cigarettes    Start date:     Last attempt to quit: 2012    Years since quittin 8   Smokeless Tobacco Former User   Tobacco Comment    1 ppd for 37 years, 2010 down to 5 cigs a day, is around second hand smoke       Current Medications  Current Outpatient Medications   Medication Sig Dispense Refill    albuterol (2 5 mg/3 mL) 0 083 % nebulizer solution Take 1 vial (2 5 mg total) by nebulization 4 (four) times a day 120 vial 5    albuterol (VENTOLIN HFA) 90 mcg/act inhaler Inhale 2 puffs every 6 (six) hours as needed for wheezing 1 Inhaler 6    alendronate (FOSAMAX) 70 mg tablet Take 1 tablet (70 mg total) by mouth every 7 days 12 tablet 3    aspirin 81 MG tablet Take 1 tablet by mouth daily      azithromycin (ZITHROMAX) 250 mg tablet Take 1 tablet (250 mg total) by mouth every 24 hours for 210 days 30 tablet 6    Cholecalciferol (VITAMIN D) 2000 units CAPS Take by mouth      ergocalciferol (VITAMIN D2) 50,000 units Take 1 25 Units by mouth daily  3    fluticasone-umeclidinium-vilanterol (TRELEGY ELLIPTA) 100-62 5-25 MCG/INH inhaler Inhale 1 puff daily Rinse mouth after use   1 Inhaler 11    ipratropium (ATROVENT) 0 02 % nebulizer solution Take 1 vial (0 5 mg total) by nebulization 4 (four) times a day 120 vial 5    pantoprazole (PROTONIX) 40 mg tablet Take by mouth Daily      predniSONE 10 mg tablet Take 2 tablets by mouth daily      rosuvastatin (CRESTOR) 10 MG tablet TAKE 1 TABLET EVERY DAY 90 tablet 1     No current facility-administered medications for this visit  Allergies  No Known Allergies    Past Medical History, Social History, Family History, medications and allergies were reviewed  Vitals  Vitals:    11/15/19 0749   BP: 110/82   BP Location: Left arm   Patient Position: Sitting   Cuff Size: Adult   Pulse: 85   Weight: 61 2 kg (135 lb)   Height: 5' 2" (1 575 m)       Physical Exam  Physical Exam   Constitutional: He is oriented to person, place, and time  He appears well-developed and well-nourished  Cardiovascular: Normal rate and regular rhythm  Pulmonary/Chest: Effort normal and breath sounds normal    Abdominal: Soft  Genitourinary:   Genitourinary Comments: Meatal stenosis   Musculoskeletal: Normal range of motion  Neurological: He is alert and oriented to person, place, and time  Skin: Skin is warm, dry and intact  Psychiatric: He has a normal mood and affect  Vitals reviewed          Results  Lab Results   Component Value Date    PSA 1 6 08/05/2019    PSA 1 8 08/21/2017    PSA 2 3 08/15/2016     Lab Results   Component Value Date    GLUCOSE 90 08/17/2015    CALCIUM 9 3 09/03/2019     08/17/2015    K 4 3 09/03/2019    CO2 29 09/03/2019     09/03/2019    BUN 15 09/03/2019    CREATININE 1 09 09/03/2019     Lab Results   Component Value Date    WBC 6 46 09/03/2019    HGB 12 7 09/03/2019    HCT 40 6 09/03/2019     (H) 09/03/2019     09/03/2019

## 2019-11-19 ENCOUNTER — TELEPHONE (OUTPATIENT)
Dept: UROLOGY | Facility: AMBULATORY SURGERY CENTER | Age: 64
End: 2019-11-19

## 2019-11-19 ENCOUNTER — TELEPHONE (OUTPATIENT)
Dept: PULMONOLOGY | Facility: CLINIC | Age: 64
End: 2019-11-19

## 2019-11-19 NOTE — TELEPHONE ENCOUNTER
Established patient needing pre-op clearance  Surgery: TRANSURETHRAL RESECTION OF BLADDER TUMOR   Surgery date: 1/3/2020  Last seen by us: 8/27/19  On oxygen: Yes    Would you like to clear patient via a phone call from last visit or would like us to schedule them with you or an AP?

## 2019-11-19 NOTE — TELEPHONE ENCOUNTER
Confirmed TURBT with MITOMYCIN 1/3/20 with Dr Tawana Garrison @ [id number]  Instructions and testimg needed discussed  Patient aware I am waiting to hear from Pulmonary and Cardiology for PRE-OP Clearance office visits  I will send paperwork after those office visits have been obtained  Patient agreeable with plan  POST OP made

## 2019-11-19 NOTE — TELEPHONE ENCOUNTER
Patient seen by Dr Bere Novoa (last seen 11/7/19) scheduled for TURBT with Mitomycin 1/3/20 with Dr Marcel Sharif   Requesting Cardiac clearance or risk assessment  Please schedule if office visit will be needed  Thank you!

## 2019-11-19 NOTE — TELEPHONE ENCOUNTER
Dr Rufus Anthony,  Echo and holter scheduled for 12/23/19  Do you prefer to wait for these results, or is it ok to complete a risk assessment now?

## 2019-11-20 NOTE — PROGRESS NOTES
Assessment/Plan:    Problem List Items Addressed This Visit        Respiratory    Stage 3 severe COPD by GOLD classification ([address] )     Symptomatic with activity though encouraged to do light activity  Continue on trelegy, duoneb, ventolin prn  Due for influenza vaccine today  He is followed by Pulm  Relevant Orders    CBC and differential    Obstructive sleep apnea     Pt reports compliance to CPAP with oxygen            Cardiovascular and Mediastinum    Cardiomyopathy, dilated ([address] )     Being evaluated for arrhythmia with echo due to complaint of a chest "jolt" in the evenings  Other    Hypercholesterolemia    Relevant Orders    Lipid panel    TSH, 3rd generation with Free T4 reflex    Comprehensive metabolic panel    Vitamin D deficiency    Relevant Orders    Vitamin D 25 hydroxy    Macrocytosis without anemia     SPEP/UPEP, vitamin B12 and folate acceptable  Will continue to monitor         Relevant Orders    CBC and differential    Bladder mass - Primary     Found on CT for w/u of weight loss  He is a former smoker  Anticipates TURBT early next year  Other Visit Diagnoses     Medicare annual wellness visit, subsequent        Screening for HIV (human immunodeficiency virus)        Relevant Orders    Human Immunodeficiency Virus 1/2 Antigen / Antibody ( Fourth Generation) with Reflex Testing    Encounter for immunization        Relevant Orders    influenza vaccine, 3688-3534, quadrivalent, recombinant, PF, 0 5 mL, for patients 18 yr+ (FLUBLOK) (Completed)          Subjective:      Patient ID: Karin Nash  is a 60 y o  male  HPI  67yo male with COPD, CHF, CAD, CM, osteoporosis with vitamin D def, HLD, KEVIN, macrocytosis and new diagnosis of urothelial carcinoma of the bladder here for follow up care  He is scheduled for TURBT with mitomycin instillation due to bladder cancer  Denies hematuria, has nocturia  He is a former smoker      Reports COPD is stable as long as he takes his inhalers  If it is too cold or too hot gets increased SOB  He has used his ventolin sometimes and therefore he has not overexerted himself  He is scheduled for holter due to complaint of a "jolt" in his chest  in the evening that is transient    He is compliant with use of CPAP    The following portions of the patient's history were reviewed and updated as appropriate: allergies, current medications, past family history, past medical history, past social history, past surgical history and problem list       Current Outpatient Medications:     albuterol (2 5 mg/3 mL) 0 083 % nebulizer solution, Take 1 vial (2 5 mg total) by nebulization 4 (four) times a day, Disp: 120 vial, Rfl: 5    albuterol (VENTOLIN HFA) 90 mcg/act inhaler, Inhale 2 puffs every 6 (six) hours as needed for wheezing, Disp: 1 Inhaler, Rfl: 6    alendronate (FOSAMAX) 70 mg tablet, Take 1 tablet (70 mg total) by mouth every 7 days, Disp: 12 tablet, Rfl: 3    aspirin 81 MG tablet, Take 1 tablet by mouth daily, Disp: , Rfl:     azithromycin (ZITHROMAX) 250 mg tablet, Take 1 tablet (250 mg total) by mouth every 24 hours for 210 days, Disp: 30 tablet, Rfl: 6    ergocalciferol (VITAMIN D2) 50,000 units, Take 1 25 Units by mouth daily, Disp: , Rfl: 3    fluticasone-umeclidinium-vilanterol (TRELEGY ELLIPTA) 100-62 5-25 MCG/INH inhaler, Inhale 1 puff daily Rinse mouth after use , Disp: 1 Inhaler, Rfl: 11    ipratropium (ATROVENT) 0 02 % nebulizer solution, Take 1 vial (0 5 mg total) by nebulization 4 (four) times a day, Disp: 120 vial, Rfl: 5    pantoprazole (PROTONIX) 40 mg tablet, Take by mouth Daily, Disp: , Rfl:     predniSONE 10 mg tablet, Take 2 tablets by mouth daily, Disp: , Rfl:     rosuvastatin (CRESTOR) 10 MG tablet, TAKE 1 TABLET EVERY DAY, Disp: 90 tablet, Rfl: 1    Cholecalciferol (VITAMIN D) 2000 units CAPS, Take by mouth, Disp: , Rfl:     Review of Systems   Constitutional: Negative for fever         +gradual weight gain   HENT: Negative  Respiratory: Positive for wheezing  Negative for cough  SHARIF   Cardiovascular: Positive for palpitations  Negative for chest pain and leg swelling  Gastrointestinal: Negative  Genitourinary: Positive for frequency  Negative for hematuria  Neurological: Negative for dizziness and headaches  Psychiatric/Behavioral: Negative  Objective:    /80 (BP Location: Left arm, Patient Position: Sitting)   Pulse 81   Temp 97 7 °F (36 5 °C)   Resp 16   Ht 5' 4" (1 626 m)   Wt 61 4 kg (135 lb 6 4 oz)   SpO2 91%   BMI 23 24 kg/m²      Physical Exam   Constitutional: He is oriented to person, place, and time  No distress  Smells of smoke   HENT:   Right Ear: External ear normal    Left Ear: External ear normal    Nose: Nose normal    Mouth/Throat: Oropharynx is clear and moist  No oropharyngeal exudate  Neck: Neck supple  Cardiovascular: Normal rate, regular rhythm and normal heart sounds  Decreased pedal pulses   Pulmonary/Chest: Effort normal  No stridor  No respiratory distress  Decreased BS, occasional faint rhonchi   Abdominal: Soft  Bowel sounds are normal  He exhibits no distension  There is no tenderness  Neurological: He is alert and oriented to person, place, and time  Skin: Skin is dry  Psychiatric: He has a normal mood and affect  His behavior is normal    Vitals reviewed

## 2019-11-21 ENCOUNTER — OFFICE VISIT (OUTPATIENT)
Dept: INTERNAL MEDICINE CLINIC | Facility: CLINIC | Age: 64
End: 2019-11-21
Payer: COMMERCIAL

## 2019-11-21 VITALS
SYSTOLIC BLOOD PRESSURE: 122 MMHG | TEMPERATURE: 97.7 F | BODY MASS INDEX: 23.12 KG/M2 | OXYGEN SATURATION: 91 % | HEART RATE: 81 BPM | HEIGHT: 64 IN | WEIGHT: 135.4 LBS | RESPIRATION RATE: 16 BRPM | DIASTOLIC BLOOD PRESSURE: 80 MMHG

## 2019-11-21 DIAGNOSIS — Z11.4 SCREENING FOR HIV (HUMAN IMMUNODEFICIENCY VIRUS): ICD-10-CM

## 2019-11-21 DIAGNOSIS — Z00.00 MEDICARE ANNUAL WELLNESS VISIT, SUBSEQUENT: ICD-10-CM

## 2019-11-21 DIAGNOSIS — E55.9 VITAMIN D DEFICIENCY: ICD-10-CM

## 2019-11-21 DIAGNOSIS — E78.00 HYPERCHOLESTEROLEMIA: ICD-10-CM

## 2019-11-21 DIAGNOSIS — D75.89 MACROCYTOSIS WITHOUT ANEMIA: ICD-10-CM

## 2019-11-21 DIAGNOSIS — G47.33 OBSTRUCTIVE SLEEP APNEA: ICD-10-CM

## 2019-11-21 DIAGNOSIS — Z23 ENCOUNTER FOR IMMUNIZATION: ICD-10-CM

## 2019-11-21 DIAGNOSIS — N32.89 BLADDER MASS: Primary | ICD-10-CM

## 2019-11-21 DIAGNOSIS — I42.0 CARDIOMYOPATHY, DILATED (HCC): ICD-10-CM

## 2019-11-21 DIAGNOSIS — J44.9 STAGE 3 SEVERE COPD BY GOLD CLASSIFICATION (HCC): ICD-10-CM

## 2019-11-21 PROCEDURE — 99214 OFFICE O/P EST MOD 30 MIN: CPT | Performed by: INTERNAL MEDICINE

## 2019-11-21 PROCEDURE — G0008 ADMIN INFLUENZA VIRUS VAC: HCPCS

## 2019-11-21 PROCEDURE — G0439 PPPS, SUBSEQ VISIT: HCPCS | Performed by: INTERNAL MEDICINE

## 2019-11-21 PROCEDURE — 90682 RIV4 VACC RECOMBINANT DNA IM: CPT

## 2019-11-21 NOTE — ASSESSMENT & PLAN NOTE
Symptomatic with activity though encouraged to do light activity  Continue on trelegy, duoneb, ventolin prn  Due for influenza vaccine today  He is followed by Pulm

## 2019-11-21 NOTE — PROGRESS NOTES
Assessment and Plan:     Problem List Items Addressed This Visit     None      Visit Diagnoses     Screening for HIV (human immunodeficiency virus)    -  Primary    Relevant Orders    Human Immunodeficiency Virus 1/2 Antigen / Antibody ( Fourth Generation) with Reflex Testing    Medicare annual wellness visit, subsequent               Preventive health issues were discussed with patient, and age appropriate screening tests were ordered as noted in patient's After Visit Summary  Personalized health advice and appropriate referrals for health education or preventive services given if needed, as noted in patient's After Visit Summary       History of Present Illness:     Patient presents for Medicare Annual Wellness visit    Patient Care Team:  Jonel Galloway DO as PCP - General  DO Uyen Olivo MD Ebony Moder, MD (Urology)     Problem List:     Patient Active Problem List   Diagnosis    Coronary artery disease    Cardiomyopathy, dilated ([facility] )    Stage 3 severe COPD by GOLD classification ([facility] )    Chronic systolic congestive heart failure ([facility] )    Left bundle-branch block    Obstructive sleep apnea    Hypercholesterolemia    Gastroesophageal reflux disease    Impaired fasting glucose    Osteoarthritis    Osteoporosis    Vitamin D deficiency    Mood disorder ([facility] )    Incidental lung nodule, less than or equal to 3mm    Other insomnia    Macrocytosis without anemia    Abnormal CBC    Weight loss    Bladder mass      Past Medical and Surgical History:     Past Medical History:   Diagnosis Date    Cardiomyopathy [facility])     Chest pain     COPD (chronic obstructive pulmonary disease) ([facility] )     Emphysema of lung ([address] )     Hypoxia     nocturnal    Left bundle branch block     Multiple pulmonary nodules     last assessed: 10/12/16    Smoker      Past Surgical History:   Procedure Laterality Date    CARDIAC CATHETERIZATION      CYSTOSCOPY        Family History:     Family History   Problem Relation Age of Onset    Diabetes Mother       Social History:     Social History     Socioeconomic History    Marital status: /Civil Union     Spouse name: None    Number of children: None    Years of education: None    Highest education level: None   Occupational History    None   Social Needs    Financial resource strain: None    Food insecurity:     Worry: None     Inability: None    Transportation needs:     Medical: None     Non-medical: None   Tobacco Use    Smoking status: Former Smoker     Packs/day: 2 50     Years: 37 00     Pack years: 92 50     Types: Cigarettes     Start date:      Last attempt to quit:      Years since quittin 8    Smokeless tobacco: Former User    Tobacco comment: 1 ppd for 37 years, 2010 down to 5 cigs a day, is around second hand smoke   Substance and Sexual Activity    Alcohol use: Not Currently     Comment: rarely    Drug use: No    Sexual activity: None   Lifestyle    Physical activity:     Days per week: None     Minutes per session: None    Stress: None   Relationships    Social connections:     Talks on phone: None     Gets together: None     Attends Che[employer] service: None     Active member of club or organization: None     Attends meetings of clubs or organizations: None     Relationship status: None    Intimate partner violence:     Fear of current or ex partner: None     Emotionally abused: None     Physically abused: None     Forced sexual activity: None   Other Topics Concern    None   Social History Narrative    Daily coffee consumption: 8 or more cups a day           Medications and Allergies:     Current Outpatient Medications   Medication Sig Dispense Refill    albuterol (2 5 mg/3 mL) 0 083 % nebulizer solution Take 1 vial (2 5 mg total) by nebulization 4 (four) times a day 120 vial 5    albuterol (VENTOLIN HFA) 90 mcg/act inhaler Inhale 2 puffs every 6 (six) hours as needed for wheezing 1 Inhaler 6  alendronate (FOSAMAX) 70 mg tablet Take 1 tablet (70 mg total) by mouth every 7 days 12 tablet 3    aspirin 81 MG tablet Take 1 tablet by mouth daily      azithromycin (ZITHROMAX) 250 mg tablet Take 1 tablet (250 mg total) by mouth every 24 hours for 210 days 30 tablet 6    ergocalciferol (VITAMIN D2) 50,000 units Take 1 25 Units by mouth daily  3    fluticasone-umeclidinium-vilanterol (TRELEGY ELLIPTA) 100-62 5-25 MCG/INH inhaler Inhale 1 puff daily Rinse mouth after use  1 Inhaler 11    ipratropium (ATROVENT) 0 02 % nebulizer solution Take 1 vial (0 5 mg total) by nebulization 4 (four) times a day 120 vial 5    pantoprazole (PROTONIX) 40 mg tablet Take by mouth Daily      predniSONE 10 mg tablet Take 2 tablets by mouth daily      rosuvastatin (CRESTOR) 10 MG tablet TAKE 1 TABLET EVERY DAY 90 tablet 1    Cholecalciferol (VITAMIN D) 2000 units CAPS Take by mouth       No current facility-administered medications for this visit        No Known Allergies   Immunizations:     Immunization History   Administered Date(s) Administered    Influenza Quadrivalent Preservative Free 3 years and older IM 10/14/2014    Influenza Quadrivalent Preservative Free ID 12/18/2015, 10/12/2016    Influenza Split High Dose Preservative Free IM 09/11/2017    Influenza TIV (IM) 04[birth year]1956    Pneumococcal Polysaccharide PPV23 12/01/2013    Td (adult), adsorbed 01/01/2012    Tuberculin Skin Test-PPD Intradermal 01/11/2016, 01/17/2017      Health Maintenance:         Topic Date Due    CRC Screening: Colonoscopy  11/10/2024    Hepatitis C Screening  Completed         Topic Date Due    DTaP,Tdap,and Td Vaccines (1 - Tdap) 04/30/1968    Pneumococcal Vaccine: Pediatrics (0 to 5 Years) and At-Risk Patients (6 to 59 Years) (2 of 3 - PCV13) 12/01/2014    Influenza Vaccine  07/01/2019      Medicare Health Risk Assessment:     /80 (BP Location: Left arm, Patient Position: Sitting)   Pulse 81   Temp 97 7 °F (36 5 °C) Resp 16   Ht 5' 4" (1 626 m)   Wt 61 4 kg (135 lb 6 4 oz)   SpO2 91%   BMI 23 24 kg/m²      Diego Bermeo is here for his Subsequent Wellness visit  Last Medicare Wellness visit information reviewed, patient interviewed and updates made to the record today  Health Risk Assessment:   Patient rates overall health as good  Patient feels that their physical health rating is same  Eyesight was rated as same  Hearing was rated as same  Patient feels that their emotional and mental health rating is same  Pain experienced in the last 7 days has been none  Patient states that he has experienced weight loss or gain in last 6 months  Depression Screening:   PHQ-2 Score: 0      Fall Risk Screening: In the past year, patient has experienced: no history of falling in past year      Home Safety:  Patient does not have trouble with stairs inside or outside of their home  Patient has working smoke alarms and has working carbon monoxide detector  Home safety hazards include: none  Nutrition:   Current diet is No Added Salt, Low Cholesterol and Low Saturated Fat  Medications:   Patient is not currently taking any over-the-counter supplements  Patient is able to manage medications  Activities of Daily Living (ADLs)/Instrumental Activities of Daily Living (IADLs):   Walk and transfer into and out of bed and chair?: Yes  Dress and groom yourself?: Yes    Bathe or shower yourself?: Yes    Feed yourself? Yes  Do your laundry/housekeeping?: Yes  Manage your money, pay your bills and track your expenses?: Yes  Make your own meals?: Yes    Do your own shopping?: Yes    Durable Medical Equipment Suppliers  [employer] [place]     Previous Hospitalizations:   Any hospitalizations or ED visits within the last 12 months?: No      Advance Care Planning:   Living will: No    Durable POA for healthcare:  Yes    Advanced directive: No    Advanced directive counseling given: Yes      Cognitive Screening:   Provider or family/friend/caregiver concerned regarding cognition?: No    PREVENTIVE SCREENINGS      Cardiovascular Screening:    General: Screening Not Indicated and History Lipid Disorder      Diabetes Screening:     General: Screening Current      Colorectal Cancer Screening:     General: Screening Current      Prostate Cancer Screening:    General: Screening Current      Osteoporosis Screening:    General: Screening Not Indicated and History Osteoporosis      Abdominal Aortic Aneurysm (AAA) Screening:    Risk factors include: tobacco use        Lung Cancer Screening:     General: Screening Current      Hepatitis C Screening:    General: Screening Current    Other Counseling Topics:   Car/seat belt/driving safety, sunscreen and calcium and vitamin D intake and regular weightbearing exercise   Immunizations discussed      Chin Castro DO

## 2019-11-21 NOTE — PATIENT INSTRUCTIONS
Medicare Preventive Visit Patient Instructions  Thank you for completing your Welcome to Medicare Visit or Medicare Annual Wellness Visit today  Your next wellness visit will be due in one year (11/21/2020)  The screening/preventive services that you may require over the next 5-10 years are detailed below  Some tests may not apply to you based off risk factors and/or age  Screening tests ordered at today's visit but not completed yet may show as past due  Also, please note that scanned in results may not display below  Preventive Screenings:  Service Recommendations Previous Testing/Comments   Colorectal Cancer Screening  · Colonoscopy    · Fecal Occult Blood Test (FOBT)/Fecal Immunochemical Test (FIT)  · Fecal DNA/Cologuard Test  · Flexible Sigmoidoscopy Age: 54-65 years old   Colonoscopy: every 10 years (May be performed more frequently if at higher risk)  OR  FOBT/FIT: every 1 year  OR  Cologuard: every 3 years  OR  Sigmoidoscopy: every 5 years  Screening may be recommended earlier than age 48 if at higher risk for colorectal cancer  Also, an individualized decision between you and your healthcare provider will decide whether screening between the ages of 74-80 would be appropriate   Colonoscopy: 11/10/2014  FOBT/FIT: Not on file  Cologuard: Not on file  Sigmoidoscopy: Not on file    Screening Current     Prostate Cancer Screening Individualized decision between patient and health care provider in men between ages of 53-78   Medicare will cover every 12 months beginning on the day after your 50th birthday PSA: 1 6 ng/mL     Screening Current     Hepatitis C Screening Once for adults born between 1945 and 1965  More frequently in patients at high risk for Hepatitis C Hep C Antibody: 08/05/2019    Screening Current   Diabetes Screening 1-2 times per year if you're at risk for diabetes or have pre-diabetes Fasting glucose: 82 mg/dL   A1C: 5 4 %    Screening Current   Cholesterol Screening Once every 5 years if you don't have a lipid disorder  May order more often based on risk factors  Lipid panel: 02/11/2019    Screening Not Indicated  History Lipid Disorder      Other Preventive Screenings Covered by Medicare:  1  Abdominal Aortic Aneurysm (AAA) Screening: covered once if your at risk  You're considered to be at risk if you have a family history of AAA or a male between the age of 73-68 who smoking at least 100 cigarettes in your lifetime  2  Lung Cancer Screening: covers low dose CT scan once per year if you meet all of the following conditions: (1) Age 50-69; (2) No signs or symptoms of lung cancer; (3) Current smoker or have quit smoking within the last 15 years; (4) You have a tobacco smoking history of at least 30 pack years (packs per day x number of years you smoked); (5) You get a written order from a healthcare provider  3  Glaucoma Screening: covered annually if you're considered high risk: (1) You have diabetes OR (2) Family history of glaucoma OR (3)  aged 48 and older OR (3)  American aged 72 and older  3  Osteoporosis Screening: covered every 2 years if you meet one of the following conditions: (1) Have a vertebral abnormality; (2) On glucocorticoid therapy for more than 3 months; (3) Have primary hyperparathyroidism; (4) On osteoporosis medications and need to assess response to drug therapy  5  HIV Screening: covered annually if you're between the age of 12-76  Also covered annually if you are younger than 13 and older than 72 with risk factors for HIV infection  For pregnant patients, it is covered up to 3 times per pregnancy      Immunizations:  Immunization Recommendations   Influenza Vaccine Annual influenza vaccination during flu season is recommended for all persons aged >= 6 months who do not have contraindications   Pneumococcal Vaccine (Prevnar and Pneumovax)  * Prevnar = PCV13  * Pneumovax = PPSV23 Adults 25-60 years old: 1-3 doses may be recommended based on certain risk factors  Adults 72 years old: Prevnar (PCV13) vaccine recommended followed by Pneumovax (PPSV23) vaccine  If already received PPSV23 since turning 65, then PCV13 recommended at least one year after PPSV23 dose  Hepatitis B Vaccine 3 dose series if at intermediate or high risk (ex: diabetes, end stage renal disease, liver disease)   Tetanus (Td) Vaccine - COST NOT COVERED BY MEDICARE PART B Following completion of primary series, a booster dose should be given every 10 years to maintain immunity against tetanus  Td may also be given as tetanus wound prophylaxis  Tdap Vaccine - COST NOT COVERED BY MEDICARE PART B Recommended at least once for all adults  For pregnant patients, recommended with each pregnancy  Shingles Vaccine (Shingrix) - COST NOT COVERED BY MEDICARE PART B  2 shot series recommended in those aged 48 and above     Health Maintenance Due:      Topic Date Due    CRC Screening: Colonoscopy  11/10/2024    Hepatitis C Screening  Completed     Immunizations Due:      Topic Date Due    DTaP,Tdap,and Td Vaccines (1 - Tdap) 04/30/1968    Pneumococcal Vaccine: Pediatrics (0 to 5 Years) and At-Risk Patients (6 to 59 Years) (2 of 3 - PCV13) 12/01/2014    Influenza Vaccine  07/01/2019     Advance Directives   What are advance directives? Advance directives are legal documents that state your wishes and plans for medical care  These plans are made ahead of time in case you lose your ability to make decisions for yourself  Advance directives can apply to any medical decision, such as the treatments you want, and if you want to donate organs  What are the types of advance directives? There are many types of advance directives, and each state has rules about how to use them  You may choose a combination of any of the following:  · Living will: This is a written record of the treatment you want   You can also choose which treatments you do not want, which to limit, and which to stop at a certain time  This includes surgery, medicine, IV fluid, and tube feedings  · Durable power of  for healthcare [place] [facility]): This is a written record that states who you want to make healthcare choices for you when you are unable to make them for yourself  This person, called a proxy, is usually a family member or a friend  You may choose more than 1 proxy  · Do not resuscitate (DNR) order:  A DNR order is used in case your heart stops beating or you stop breathing  It is a request not to have certain forms of treatment, such as CPR  A DNR order may be included in other types of advance directives  · Medical directive: This covers the care that you want if you are in a coma, near death, or unable to make decisions for yourself  You can list the treatments you want for each condition  Treatment may include pain medicine, surgery, blood transfusions, dialysis, IV or tube feedings, and a ventilator (breathing machine)  · Values history: This document has questions about your views, beliefs, and how you feel and think about life  This information can help others choose the care that you would choose  Why are advance directives important? An advance directive helps you control your care  Although spoken wishes may be used, it is better to have your wishes written down  Spoken wishes can be misunderstood, or not followed  Treatments may be given even if you do not want them  An advance directive may make it easier for your family to make difficult choices about your care  © Copyright [employer] 2018 Information is for End User's use only and may not be sold, redistributed or otherwise used for commercial purposes  All illustrations and images included in CareNotes® are the copyrighted property of [employer]  or [facility] Preventive Visit Patient Instructions  Thank you for completing your Welcome to Medicare Visit or Medicare Annual Wellness Visit today   Your next wellness visit will be due in one year (11/21/2020)  The screening/preventive services that you may require over the next 5-10 years are detailed below  Some tests may not apply to you based off risk factors and/or age  Screening tests ordered at today's visit but not completed yet may show as past due  Also, please note that scanned in results may not display below  Preventive Screenings:  Service Recommendations Previous Testing/Comments   Colorectal Cancer Screening  · Colonoscopy    · Fecal Occult Blood Test (FOBT)/Fecal Immunochemical Test (FIT)  · Fecal DNA/Cologuard Test  · Flexible Sigmoidoscopy Age: 54-65 years old   Colonoscopy: every 10 years (May be performed more frequently if at higher risk)  OR  FOBT/FIT: every 1 year  OR  Cologuard: every 3 years  OR  Sigmoidoscopy: every 5 years  Screening may be recommended earlier than age 48 if at higher risk for colorectal cancer  Also, an individualized decision between you and your healthcare provider will decide whether screening between the ages of 74-80 would be appropriate  Colonoscopy: 11/10/2014  FOBT/FIT: Not on file  Cologuard: Not on file  Sigmoidoscopy: Not on file    Screening Current     Prostate Cancer Screening Individualized decision between patient and health care provider in men between ages of 53-78   Medicare will cover every 12 months beginning on the day after your 50th birthday PSA: 1 6 ng/mL     Screening Current     Hepatitis C Screening Once for adults born between 1945 and 1965  More frequently in patients at high risk for Hepatitis C Hep C Antibody: 08/05/2019    Screening Current   Diabetes Screening 1-2 times per year if you're at risk for diabetes or have pre-diabetes Fasting glucose: 82 mg/dL   A1C: 5 4 %    Screening Current   Cholesterol Screening Once every 5 years if you don't have a lipid disorder  May order more often based on risk factors   Lipid panel: 02/11/2019    Screening Not Indicated  History Lipid Disorder      Other Preventive Screenings Covered by Medicare:  6  Abdominal Aortic Aneurysm (AAA) Screening: covered once if your at risk  You're considered to be at risk if you have a family history of AAA or a male between the age of 73-68 who smoking at least 100 cigarettes in your lifetime  7  Lung Cancer Screening: covers low dose CT scan once per year if you meet all of the following conditions: (1) Age 50-69; (2) No signs or symptoms of lung cancer; (3) Current smoker or have quit smoking within the last 15 years; (4) You have a tobacco smoking history of at least 30 pack years (packs per day x number of years you smoked); (5) You get a written order from a healthcare provider  8  Glaucoma Screening: covered annually if you're considered high risk: (1) You have diabetes OR (2) Family history of glaucoma OR (3)  aged 48 and older OR (3)  American aged 72 and older  5  Osteoporosis Screening: covered every 2 years if you meet one of the following conditions: (1) Have a vertebral abnormality; (2) On glucocorticoid therapy for more than 3 months; (3) Have primary hyperparathyroidism; (4) On osteoporosis medications and need to assess response to drug therapy  10  HIV Screening: covered annually if you're between the age of 12-76  Also covered annually if you are younger than 13 and older than 72 with risk factors for HIV infection  For pregnant patients, it is covered up to 3 times per pregnancy  Immunizations:  Immunization Recommendations   Influenza Vaccine Annual influenza vaccination during flu season is recommended for all persons aged >= 6 months who do not have contraindications   Pneumococcal Vaccine (Prevnar and Pneumovax)  * Prevnar = PCV13  * Pneumovax = PPSV23 Adults 25-60 years old: 1-3 doses may be recommended based on certain risk factors  Adults 72 years old: Prevnar (PCV13) vaccine recommended followed by Pneumovax (PPSV23) vaccine   If already received PPSV23 since turning 65, then PCV13 recommended at least one year after PPSV23 dose  Hepatitis B Vaccine 3 dose series if at intermediate or high risk (ex: diabetes, end stage renal disease, liver disease)   Tetanus (Td) Vaccine - COST NOT COVERED BY MEDICARE PART B Following completion of primary series, a booster dose should be given every 10 years to maintain immunity against tetanus  Td may also be given as tetanus wound prophylaxis  Tdap Vaccine - COST NOT COVERED BY MEDICARE PART B Recommended at least once for all adults  For pregnant patients, recommended with each pregnancy  Shingles Vaccine (Shingrix) - COST NOT COVERED BY MEDICARE PART B  2 shot series recommended in those aged 48 and above     Health Maintenance Due:      Topic Date Due    CRC Screening: Colonoscopy  11/10/2024    Hepatitis C Screening  Completed     Immunizations Due:      Topic Date Due    DTaP,Tdap,and Td Vaccines (1 - Tdap) 04/30/1968    Pneumococcal Vaccine: Pediatrics (0 to 5 Years) and At-Risk Patients (6 to 59 Years) (2 of 3 - PCV13) 12/01/2014    Influenza Vaccine  07/01/2019     Advance Directives   What are advance directives? Advance directives are legal documents that state your wishes and plans for medical care  These plans are made ahead of time in case you lose your ability to make decisions for yourself  Advance directives can apply to any medical decision, such as the treatments you want, and if you want to donate organs  What are the types of advance directives? There are many types of advance directives, and each state has rules about how to use them  You may choose a combination of any of the following:  · Living will: This is a written record of the treatment you want  You can also choose which treatments you do not want, which to limit, and which to stop at a certain time  This includes surgery, medicine, IV fluid, and tube feedings  · Durable power of  for healthcare [place] [facility]):   This is a written record that states who you want to make healthcare choices for you when you are unable to make them for yourself  This person, called a proxy, is usually a family member or a friend  You may choose more than 1 proxy  · Do not resuscitate (DNR) order:  A DNR order is used in case your heart stops beating or you stop breathing  It is a request not to have certain forms of treatment, such as CPR  A DNR order may be included in other types of advance directives  · Medical directive: This covers the care that you want if you are in a coma, near death, or unable to make decisions for yourself  You can list the treatments you want for each condition  Treatment may include pain medicine, surgery, blood transfusions, dialysis, IV or tube feedings, and a ventilator (breathing machine)  · Values history: This document has questions about your views, beliefs, and how you feel and think about life  This information can help others choose the care that you would choose  Why are advance directives important? An advance directive helps you control your care  Although spoken wishes may be used, it is better to have your wishes written down  Spoken wishes can be misunderstood, or not followed  Treatments may be given even if you do not want them  An advance directive may make it easier for your family to make difficult choices about your care  © Copyright [employer] 2018 Information is for End User's use only and may not be sold, redistributed or otherwise used for commercial purposes   All illustrations and images included in CareNotes® are the copyrighted property of [employer]  or [address] [place] [address] [employer]

## 2019-12-10 NOTE — H&P (VIEW-ONLY)
Pulmonary Follow Up Note   Estefani Chaidez  60 y o  male MRN: [id number]  12/12/2019      Assessment:  Mr Leopold Bilberry presents to the office today for preoperative clearance for a trans urethral resection of a bladder tumor and instillation of mitomycin  Patient does have severe stage III COPD and chronically needs oxygen with ambulation and uses CPAP for hours asleep  He is optimized from pulmonary standpoint on inhalers, nebulizers, daily azithromycin, and daily prednisone  Based on their risk stratifications noted below he is at low risk for possible pulmonary perioperative or postoperative complications  I discussed this with the patient and his wife today  Given that the patient needs to pursue removal of this tumor I have cleared him from pulmonary perspective to pursue this procedure  They do understand the possible and potential pulmonary risks/complications  All other questions were answered today  He should continue with his current regimen    He will need follow-up with Dr Kevin Bhandari in 3 months    They know to call our office with any questions or concerns and follow up sooner if needed  Obstructive sleep apnea  1  Continue CPAP for hours asleep with oxygen titrated in line  2  Follow-up in 3 months for compliance check    Stage 3 severe COPD by GOLD classification ([address] )  3  Continue Trelegy Inhaler   4  Continue albuterol and Atrovent nebulizers q i d   5  Continue azithromycin q day  6  Continue prednisone 10 mg p o  Q day  7  Continue oxygen at 2 liters/minute with ambulation  8  Continue p r n  Ventolin inhaler  9  Follow-up with Dr Deric Cormier in 3 months    Incidental lung nodule, less than or equal to 3mm  10  Repeat CT scan of the chest in August of 2020     6 minutes walk test:  Patient underwent a 6 minutes walk test   At rest his heart rate was 96 beats per minute with SpO2 of 94%  Ambulated approximately 189 eager is a  His SpO2 fell to 86%    He required 2 liters/minute to maintain adequate oxygen saturations to complete the test   Patient ambulated a total of 462 meters  His ending heart rate was 102 beats per minute and he was 93% SpO2 on 2 liters/minute nasal cannula  Plan:    Diagnoses and all orders for this visit:    Chronic obstructive pulmonary disease, unspecified COPD type ([facility] )  -     POCT 6 minute walk  -     POCT spirometry    Stage 3 severe COPD by GOLD classification ([facility] )    Incidental lung nodule, less than or equal to 3mm    Obstructive sleep apnea        No follow-ups on file  History of Present Illness   HPI:  Dacia Shaffer  is a 60 y o  male who presents to the office for preop clearance  Patient has a pulmonary past medical history significant for very severe COPD, KEVIN, chronic hypoxic respiratory failure, and pulmonary nodules  Patient is scheduled for a transurethral bladder resection of a bladder tumor  He presents to the office today for preoperative clearance  Patient typically follows with Dr Luque in the pulmonary office for his severe COPD  He was recently evaluated by our lung transplant team and he deferred any further surgical management for his underlying lung disease  He is still having exertional dyspnea  He reports that minimal movement causes him to become SOB and he has to use the neblizer  He does then have relief  He denies any current cough, wheezing, or sputum production above baseline  He denies any fevers or chills  He denies any sick contacts  From pulmonary standpoint he is at his baseline  He is taking his inhalers and nebulizers as prescribed  At baseline patient takes trelegy, DuoNeb q i d , and Proventil p r n  He takes prednisone 10 mg p  o  q day, he takes azithromycin q day  ARISCAT preoperative pulmonary risk index:  Patient scored 11 points  This puts him at low risk of about 7 7% pulmonary complication rate  Mario Edwards postoperative respiratory failure risk calculator:  3 2%    This puts him in the low risk of respiratory failure  [facility] respiratory failure index:  Score of 24 putting him in class 3 with 4 2% respiratory failure risk  Patient is clear from pulmonary perspective to proceed with his urological procedure scheduled for January  Review of Systems   Constitutional: Negative for activity change, appetite change, chills, diaphoresis, fatigue, fever and unexpected weight change  HENT: Negative for congestion, hearing loss, mouth sores, nosebleeds, postnasal drip, rhinorrhea, sinus pressure and trouble swallowing  Respiratory: Positive for cough, chest tightness, shortness of breath and wheezing  Negative for apnea, choking and stridor  Cardiovascular: Negative for chest pain, palpitations and leg swelling  Gastrointestinal: Negative for abdominal distention, abdominal pain, constipation, diarrhea, nausea and vomiting  Genitourinary: Negative for difficulty urinating  Musculoskeletal: Negative for arthralgias  Skin: Negative for rash  Neurological: Negative for dizziness  Hematological: Negative for adenopathy  Psychiatric/Behavioral: Negative for agitation  All other systems reviewed and are negative        Historical Information   Past Medical History:   Diagnosis Date    Cardiomyopathy [facility])     Chest pain     [facility]     Emphysema of lung (HCC)     Hypoxia     nocturnal    Left bundle branch block     Multiple pulmonary nodules     last assessed: 10/12/16    Smoker      Past Surgical History:   Procedure Laterality Date    CARDIAC CATHETERIZATION      CYSTOSCOPY       Family History   Problem Relation Age of Onset    Diabetes Mother          Meds/Allergies     Current Outpatient Medications:     albuterol (2 5 mg/3 mL) 0 083 % nebulizer solution, Take 1 vial (2 5 mg total) by nebulization 4 (four) times a day, Disp: 120 vial, Rfl: 5    albuterol (VENTOLIN HFA) 90 mcg/act inhaler, Inhale 2 puffs every 6 (six) hours as needed for wheezing, Disp: 1 Inhaler, Rfl: 6    alendronate (FOSAMAX) 70 mg tablet, Take 1 tablet (70 mg total) by mouth every 7 days, Disp: 12 tablet, Rfl: 3    aspirin 81 MG tablet, Take 1 tablet by mouth daily, Disp: , Rfl:     azithromycin (ZITHROMAX) 250 mg tablet, Take 1 tablet (250 mg total) by mouth every 24 hours for 210 days, Disp: 30 tablet, Rfl: 6    Cholecalciferol (VITAMIN D) 2000 units CAPS, Take by mouth, Disp: , Rfl:     ergocalciferol (VITAMIN D2) 50,000 units, Take 1 25 Units by mouth daily, Disp: , Rfl: 3    fluticasone-umeclidinium-vilanterol (TRELEGY ELLIPTA) 100-62 5-25 MCG/INH inhaler, Inhale 1 puff daily Rinse mouth after use , Disp: 1 Inhaler, Rfl: 11    ipratropium (ATROVENT) 0 02 % nebulizer solution, Take 1 vial (0 5 mg total) by nebulization 4 (four) times a day, Disp: 120 vial, Rfl: 5    pantoprazole (PROTONIX) 40 mg tablet, Take by mouth Daily, Disp: , Rfl:     predniSONE 10 mg tablet, Take 2 tablets by mouth daily, Disp: , Rfl:     rosuvastatin (CRESTOR) 10 MG tablet, TAKE 1 TABLET EVERY DAY, Disp: 90 tablet, Rfl: 1  No Known Allergies    Vitals: Blood pressure 118/70, pulse 88, temperature (!) 97 4 °F (36 3 °C), resp  rate 14, height 5' 2" (1 575 m), weight 60 4 kg (133 lb 3 2 oz), SpO2 94 %  Body mass index is 24 36 kg/m²  Physical Exam  Physical Exam   Constitutional: He is oriented to person, place, and time  No distress  Thin frail cachectic male  HENT:   Head: Normocephalic and atraumatic  Nose: Nose normal    Mouth/Throat: Oropharynx is clear and moist  No oropharyngeal exudate  Eyes: Pupils are equal, round, and reactive to light  EOM are normal  No scleral icterus  Neck: Normal range of motion  Neck supple  No JVD present  No tracheal deviation present  No thyromegaly present  Cardiovascular: Normal rate and regular rhythm  Exam reveals no gallop and no friction rub  No murmur heard    Pulmonary/Chest: Effort normal  No respiratory distress  He exhibits no tenderness  Breath sounds decreased throughout all lung fields without any wheezes, rales, rhonchi  Abdominal: Soft  Bowel sounds are normal  He exhibits no distension  There is no tenderness  Musculoskeletal: He exhibits no edema  Lymphadenopathy:     He has no cervical adenopathy  Neurological: He is alert and oriented to person, place, and time  Skin: Skin is warm and dry  Capillary refill takes less than 2 seconds  He is not diaphoretic  Psychiatric: He has a normal mood and affect  Nursing note and vitals reviewed  Labs: I have personally reviewed pertinent lab results  , ABG: No results found for: PHART, DPA6EVM, PO2ART, QTQ1RYU, E5KDWWPN, BEART, SOURCE, BNP: No results found for: BNP, CBC: No results found for: WBC, HGB, HCT, MCV, PLT, ADJUSTEDWBC, MCH, MCHC, RDW, MPV, NRBC, CMP: No results found for: SODIUM, K, CL, CO2, ANIONGAP, BUN, CREATININE, GLUCOSE, CALCIUM, AST, ALT, ALKPHOS, PROT, BILITOT, EGFR, PT/INR: No results found for: PT, INR, Troponin: No results found for: TROPONINI  Lab Results   Component Value Date    WBC 6 46 09/03/2019    HGB 12 7 09/03/2019    HCT 40 6 09/03/2019     (H) 09/03/2019     09/03/2019     Lab Results   Component Value Date    GLUCOSE 90 08/17/2015    CALCIUM 9 3 09/03/2019     08/17/2015    K 4 3 09/03/2019    CO2 29 09/03/2019     09/03/2019    BUN 15 09/03/2019    CREATININE 1 09 09/03/2019     No results found for: IGE  Lab Results   Component Value Date    ALT 22 02/11/2019    AST 20 02/11/2019    ALKPHOS 78 02/11/2019    BILITOT 0 60 08/17/2015         Imaging and other studies: I have personally reviewed pertinent reports  CT scan from 08/01/2019  FINDINGS:     LUNGS:  Moderate centrilobular emphysema  New 2 mm nodule in the left lower lobe (series 3, image 68)  Increased conspicuity of a tiny cluster of sub-2 mm nodules in the anterior left upper lobe (series 3, image 57)    3 mm calcified granuloma in the   right lower lobe  Scattered areas of subsegmental atelectasis and parenchymal scarring  Stable ill-defined groundglass area in the anterior right upper lobe likely from scarring  There is no tracheal or endobronchial lesion      PLEURA:  Unremarkable      HEART/GREAT VESSELS:  Unremarkable for patient's age      MEDIASTINUM AND JES:  Unremarkable      CHEST WALL AND LOWER NECK:   Unremarkable      VISUALIZED STRUCTURES IN THE UPPER ABDOMEN:  Unremarkable      OSSEOUS STRUCTURES:  No acute fracture or destructive osseous lesion  Chronic compression deformity of T8      IMPRESSION:     New 2 mm nodule in the left lower lobe      Lung-RADS:  Lung-RADS2, benign appearance or behavior  Continue annual screening with LDCT in 12 months      Emphysema  Pulmonary function testing:   Spirometry Results:  May 2019:  FEV1 31% predicted  In 2013 was 27% predicted  In office spirometry performed today 12/12/2019:  FEV1 predicted 44%  EKG, Pathology, and Other Studies: I have personally reviewed pertinent reports  6 minutes walk test  Patient underwent a 6 minutes walk test   At rest his heart rate was 96 beats per minute with SpO2 of 94%  Ambulated approximately 189 eager is a  His SpO2 fell to 86%  He required 2 liters/minute to maintain adequate oxygen saturations to complete the test   Patient ambulated a total of 462 meters  His ending heart rate was 102 beats per minute and he was 93% SpO2 on 2 liters/minute nasal cannula        Christine Ayoub PA-C

## 2019-12-10 NOTE — PROGRESS NOTES
Pulmonary Follow Up Note   Jessica Son  60 y o  male MRN: [id number]  12/12/2019      Assessment:  Mr Анна Grace presents to the office today for preoperative clearance for a trans urethral resection of a bladder tumor and instillation of mitomycin  Patient does have severe stage III COPD and chronically needs oxygen with ambulation and uses CPAP for hours asleep  He is optimized from pulmonary standpoint on inhalers, nebulizers, daily azithromycin, and daily prednisone  Based on their risk stratifications noted below he is at low risk for possible pulmonary perioperative or postoperative complications  I discussed this with the patient and his wife today  Given that the patient needs to pursue removal of this tumor I have cleared him from pulmonary perspective to pursue this procedure  They do understand the possible and potential pulmonary risks/complications  All other questions were answered today  He should continue with his current regimen    He will need follow-up with Dr Duyen Booker in 3 months    They know to call our office with any questions or concerns and follow up sooner if needed  Obstructive sleep apnea  1  Continue CPAP for hours asleep with oxygen titrated in line  2  Follow-up in 3 months for compliance check    Stage 3 severe COPD by GOLD classification ([address] )  3  Continue Trelegy Inhaler   4  Continue albuterol and Atrovent nebulizers q i d   5  Continue azithromycin q day  6  Continue prednisone 10 mg p o  Q day  7  Continue oxygen at 2 liters/minute with ambulation  8  Continue p r n  Ventolin inhaler  9  Follow-up with Dr Jeffery Medina in 3 months    Incidental lung nodule, less than or equal to 3mm  10  Repeat CT scan of the chest in August of 2020     6 minutes walk test:  Patient underwent a 6 minutes walk test   At rest his heart rate was 96 beats per minute with SpO2 of 94%  Ambulated approximately 189 eager is a  His SpO2 fell to 86%    He required 2 liters/minute to maintain adequate oxygen saturations to complete the test   Patient ambulated a total of 462 meters  His ending heart rate was 102 beats per minute and he was 93% SpO2 on 2 liters/minute nasal cannula  Plan:    Diagnoses and all orders for this visit:    Chronic obstructive pulmonary disease, unspecified COPD type ([facility] )  -     POCT 6 minute walk  -     POCT spirometry    Stage 3 severe COPD by GOLD classification ([facility] )    Incidental lung nodule, less than or equal to 3mm    Obstructive sleep apnea        No follow-ups on file  History of Present Illness   HPI:  Sabino Aguilar  is a 60 y o  male who presents to the office for preop clearance  Patient has a pulmonary past medical history significant for very severe COPD, KEVIN, chronic hypoxic respiratory failure, and pulmonary nodules  Patient is scheduled for a transurethral bladder resection of a bladder tumor  He presents to the office today for preoperative clearance  Patient typically follows with Dr Luque in the pulmonary office for his severe COPD  He was recently evaluated by our lung transplant team and he deferred any further surgical management for his underlying lung disease  He is still having exertional dyspnea  He reports that minimal movement causes him to become SOB and he has to use the neblizer  He does then have relief  He denies any current cough, wheezing, or sputum production above baseline  He denies any fevers or chills  He denies any sick contacts  From pulmonary standpoint he is at his baseline  He is taking his inhalers and nebulizers as prescribed  At baseline patient takes trelegy, DuoNeb q i d , and Proventil p r n  He takes prednisone 10 mg p  o  q day, he takes azithromycin q day  ARISCAT preoperative pulmonary risk index:  Patient scored 11 points  This puts him at low risk of about 9 3% pulmonary complication rate  Earney Smoke postoperative respiratory failure risk calculator:  3 2%    This puts him in the low risk of respiratory failure  [facility] respiratory failure index:  Score of 24 putting him in class 3 with 4 2% respiratory failure risk  Patient is clear from pulmonary perspective to proceed with his urological procedure scheduled for January  Review of Systems   Constitutional: Negative for activity change, appetite change, chills, diaphoresis, fatigue, fever and unexpected weight change  HENT: Negative for congestion, hearing loss, mouth sores, nosebleeds, postnasal drip, rhinorrhea, sinus pressure and trouble swallowing  Respiratory: Positive for cough, chest tightness, shortness of breath and wheezing  Negative for apnea, choking and stridor  Cardiovascular: Negative for chest pain, palpitations and leg swelling  Gastrointestinal: Negative for abdominal distention, abdominal pain, constipation, diarrhea, nausea and vomiting  Genitourinary: Negative for difficulty urinating  Musculoskeletal: Negative for arthralgias  Skin: Negative for rash  Neurological: Negative for dizziness  Hematological: Negative for adenopathy  Psychiatric/Behavioral: Negative for agitation  All other systems reviewed and are negative        Historical Information   Past Medical History:   Diagnosis Date    [facility]omyopathy [facility])     Chest pain     [facility])     Emphysema of lung (HCC)     Hypoxia     nocturnal    Left bundle branch block     Multiple pulmonary nodules     last assessed: 10/12/16    Smoker      Past Surgical History:   Procedure Laterality Date    CARDIAC CATHETERIZATION      CYSTOSCOPY       Family History   Problem Relation Age of Onset    Diabetes Mother          Meds/Allergies     Current Outpatient Medications:     albuterol (2 5 mg/3 mL) 0 083 % nebulizer solution, Take 1 vial (2 5 mg total) by nebulization 4 (four) times a day, Disp: 120 vial, Rfl: 5    albuterol (VENTOLIN HFA) 90 mcg/act inhaler, Inhale 2 puffs every 6 (six) hours as needed for wheezing, Disp: 1 Inhaler, Rfl: 6    alendronate (FOSAMAX) 70 mg tablet, Take 1 tablet (70 mg total) by mouth every 7 days, Disp: 12 tablet, Rfl: 3    aspirin 81 MG tablet, Take 1 tablet by mouth daily, Disp: , Rfl:     azithromycin (ZITHROMAX) 250 mg tablet, Take 1 tablet (250 mg total) by mouth every 24 hours for 210 days, Disp: 30 tablet, Rfl: 6    Cholecalciferol (VITAMIN D) 2000 units CAPS, Take by mouth, Disp: , Rfl:     ergocalciferol (VITAMIN D2) 50,000 units, Take 1 25 Units by mouth daily, Disp: , Rfl: 3    fluticasone-umeclidinium-vilanterol (TRELEGY ELLIPTA) 100-62 5-25 MCG/INH inhaler, Inhale 1 puff daily Rinse mouth after use , Disp: 1 Inhaler, Rfl: 11    ipratropium (ATROVENT) 0 02 % nebulizer solution, Take 1 vial (0 5 mg total) by nebulization 4 (four) times a day, Disp: 120 vial, Rfl: 5    pantoprazole (PROTONIX) 40 mg tablet, Take by mouth Daily, Disp: , Rfl:     predniSONE 10 mg tablet, Take 2 tablets by mouth daily, Disp: , Rfl:     rosuvastatin (CRESTOR) 10 MG tablet, TAKE 1 TABLET EVERY DAY, Disp: 90 tablet, Rfl: 1  No Known Allergies    Vitals: Blood pressure 118/70, pulse 88, temperature (!) 97 4 °F (36 3 °C), resp  rate 14, height 5' 2" (1 575 m), weight 60 4 kg (133 lb 3 2 oz), SpO2 94 %  Body mass index is 24 36 kg/m²  Physical Exam  Physical Exam   Constitutional: He is oriented to person, place, and time  No distress  Thin frail cachectic male  HENT:   Head: Normocephalic and atraumatic  Nose: Nose normal    Mouth/Throat: Oropharynx is clear and moist  No oropharyngeal exudate  Eyes: Pupils are equal, round, and reactive to light  EOM are normal  No scleral icterus  Neck: Normal range of motion  Neck supple  No JVD present  No tracheal deviation present  No thyromegaly present  Cardiovascular: Normal rate and regular rhythm  Exam reveals no gallop and no friction rub  No murmur heard    Pulmonary/Chest: Effort normal  No respiratory distress  He exhibits no tenderness  Breath sounds decreased throughout all lung fields without any wheezes, rales, rhonchi  Abdominal: Soft  Bowel sounds are normal  He exhibits no distension  There is no tenderness  Musculoskeletal: He exhibits no edema  Lymphadenopathy:     He has no cervical adenopathy  Neurological: He is alert and oriented to person, place, and time  Skin: Skin is warm and dry  Capillary refill takes less than 2 seconds  He is not diaphoretic  Psychiatric: He has a normal mood and affect  Nursing note and vitals reviewed  Labs: I have personally reviewed pertinent lab results  , ABG: No results found for: PHART, UKT3NMO, PO2ART, FWV5YMD, U9UVKPEP, BEART, SOURCE, BNP: No results found for: BNP, CBC: No results found for: WBC, HGB, HCT, MCV, PLT, ADJUSTEDWBC, MCH, MCHC, RDW, MPV, NRBC, CMP: No results found for: SODIUM, K, CL, CO2, ANIONGAP, BUN, CREATININE, GLUCOSE, CALCIUM, AST, ALT, ALKPHOS, PROT, BILITOT, EGFR, PT/INR: No results found for: PT, INR, Troponin: No results found for: TROPONINI  Lab Results   Component Value Date    WBC 6 46 09/03/2019    HGB 12 7 09/03/2019    HCT 40 6 09/03/2019     (H) 09/03/2019     09/03/2019     Lab Results   Component Value Date    GLUCOSE 90 08/17/2015    CALCIUM 9 3 09/03/2019     08/17/2015    K 4 3 09/03/2019    CO2 29 09/03/2019     09/03/2019    BUN 15 09/03/2019    CREATININE 1 09 09/03/2019     No results found for: IGE  Lab Results   Component Value Date    ALT 22 02/11/2019    AST 20 02/11/2019    ALKPHOS 78 02/11/2019    BILITOT 0 60 08/17/2015         Imaging and other studies: I have personally reviewed pertinent reports  CT scan from 08/01/2019  FINDINGS:     LUNGS:  Moderate centrilobular emphysema  New 2 mm nodule in the left lower lobe (series 3, image 68)  Increased conspicuity of a tiny cluster of sub-2 mm nodules in the anterior left upper lobe (series 3, image 57)    3 mm calcified granuloma in the   right lower lobe  Scattered areas of subsegmental atelectasis and parenchymal scarring  Stable ill-defined groundglass area in the anterior right upper lobe likely from scarring  There is no tracheal or endobronchial lesion      PLEURA:  Unremarkable      HEART/GREAT VESSELS:  Unremarkable for patient's age      MEDIASTINUM AND JES:  Unremarkable      CHEST WALL AND LOWER NECK:   Unremarkable      VISUALIZED STRUCTURES IN THE UPPER ABDOMEN:  Unremarkable      OSSEOUS STRUCTURES:  No acute fracture or destructive osseous lesion  Chronic compression deformity of T8      IMPRESSION:     New 2 mm nodule in the left lower lobe      Lung-RADS:  Lung-RADS2, benign appearance or behavior  Continue annual screening with LDCT in 12 months      Emphysema  Pulmonary function testing:   Spirometry Results:  May 2019:  FEV1 31% predicted  In 2013 was 27% predicted  In office spirometry performed today 12/12/2019:  FEV1 predicted 44%  EKG, Pathology, and Other Studies: I have personally reviewed pertinent reports  6 minutes walk test  Patient underwent a 6 minutes walk test   At rest his heart rate was 96 beats per minute with SpO2 of 94%  Ambulated approximately 189 eager is a  His SpO2 fell to 86%  He required 2 liters/minute to maintain adequate oxygen saturations to complete the test   Patient ambulated a total of 462 meters  His ending heart rate was 102 beats per minute and he was 93% SpO2 on 2 liters/minute nasal cannula        Travis Tarango PA-C

## 2019-12-12 ENCOUNTER — OFFICE VISIT (OUTPATIENT)
Dept: PULMONOLOGY | Facility: CLINIC | Age: 64
End: 2019-12-12
Payer: COMMERCIAL

## 2019-12-12 VITALS
TEMPERATURE: 97.4 F | DIASTOLIC BLOOD PRESSURE: 70 MMHG | HEART RATE: 88 BPM | HEIGHT: 62 IN | RESPIRATION RATE: 14 BRPM | BODY MASS INDEX: 24.51 KG/M2 | OXYGEN SATURATION: 94 % | SYSTOLIC BLOOD PRESSURE: 118 MMHG | WEIGHT: 133.2 LBS

## 2019-12-12 DIAGNOSIS — R91.1 INCIDENTAL LUNG NODULE, LESS THAN OR EQUAL TO 3MM: ICD-10-CM

## 2019-12-12 DIAGNOSIS — J44.9 STAGE 3 SEVERE COPD BY GOLD CLASSIFICATION (HCC): ICD-10-CM

## 2019-12-12 DIAGNOSIS — G47.33 OBSTRUCTIVE SLEEP APNEA: ICD-10-CM

## 2019-12-12 DIAGNOSIS — J44.9 CHRONIC OBSTRUCTIVE PULMONARY DISEASE, UNSPECIFIED COPD TYPE (HCC): Primary | ICD-10-CM

## 2019-12-12 PROCEDURE — 94618 PULMONARY STRESS TESTING: CPT | Performed by: PHYSICIAN ASSISTANT

## 2019-12-12 PROCEDURE — 94010 BREATHING CAPACITY TEST: CPT | Performed by: PHYSICIAN ASSISTANT

## 2019-12-12 PROCEDURE — 99213 OFFICE O/P EST LOW 20 MIN: CPT | Performed by: PHYSICIAN ASSISTANT

## 2019-12-12 NOTE — ASSESSMENT & PLAN NOTE
1  Continue Trelegy Inhaler   2  Continue albuterol and Atrovent nebulizers q i d   3  Continue azithromycin q day  4  Continue prednisone 10 mg p o  Q day  5  Continue oxygen at 2 liters/minute with ambulation  6  Continue p r n  Ventolin inhaler  7   Follow-up with Dr Sandoval Needs in 3 months

## 2019-12-12 NOTE — PATIENT INSTRUCTIONS
1  Continue your pulmonary regimen as previously prescribed  We have made no changes    2  Follow-up with Dr Luque in the pulmonary office in 3 months

## 2019-12-12 NOTE — ASSESSMENT & PLAN NOTE
1  Continue CPAP for hours asleep with oxygen titrated in line    2  Follow-up in 3 months for compliance check

## 2019-12-16 ENCOUNTER — APPOINTMENT (OUTPATIENT)
Dept: LAB | Facility: HOSPITAL | Age: 64
End: 2019-12-16
Attending: UROLOGY
Payer: COMMERCIAL

## 2019-12-16 DIAGNOSIS — N32.89 BLADDER MASS: ICD-10-CM

## 2019-12-16 DIAGNOSIS — Z11.4 SCREENING FOR HIV (HUMAN IMMUNODEFICIENCY VIRUS): Primary | ICD-10-CM

## 2019-12-16 LAB
ANION GAP SERPL CALCULATED.3IONS-SCNC: 6 MMOL/L (ref 4–13)
BASOPHILS # BLD AUTO: 0.06 THOUSANDS/ΜL (ref 0–0.1)
BASOPHILS NFR BLD AUTO: 1 % (ref 0–1)
BUN SERPL-MCNC: 23 MG/DL (ref 5–25)
CALCIUM SERPL-MCNC: 9.5 MG/DL (ref 8.3–10.1)
CHLORIDE SERPL-SCNC: 105 MMOL/L (ref 100–108)
CO2 SERPL-SCNC: 29 MMOL/L (ref 21–32)
CREAT SERPL-MCNC: 1.16 MG/DL (ref 0.6–1.3)
EOSINOPHIL # BLD AUTO: 0.19 THOUSAND/ΜL (ref 0–0.61)
EOSINOPHIL NFR BLD AUTO: 3 % (ref 0–6)
ERYTHROCYTE [DISTWIDTH] IN BLOOD BY AUTOMATED COUNT: 12.8 % (ref 11.6–15.1)
GFR SERPL CREATININE-BSD FRML MDRD: 67 ML/MIN/1.73SQ M
GLUCOSE P FAST SERPL-MCNC: 72 MG/DL (ref 65–99)
HCT VFR BLD AUTO: 42.4 % (ref 36.5–49.3)
HGB BLD-MCNC: 13.6 G/DL (ref 12–17)
IMM GRANULOCYTES # BLD AUTO: 0.01 THOUSAND/UL (ref 0–0.2)
IMM GRANULOCYTES NFR BLD AUTO: 0 % (ref 0–2)
LYMPHOCYTES # BLD AUTO: 1.84 THOUSANDS/ΜL (ref 0.6–4.47)
LYMPHOCYTES NFR BLD AUTO: 30 % (ref 14–44)
MCH RBC QN AUTO: 32.5 PG (ref 26.8–34.3)
MCHC RBC AUTO-ENTMCNC: 32.1 G/DL (ref 31.4–37.4)
MCV RBC AUTO: 101 FL (ref 82–98)
MONOCYTES # BLD AUTO: 0.6 THOUSAND/ΜL (ref 0.17–1.22)
MONOCYTES NFR BLD AUTO: 10 % (ref 4–12)
NEUTROPHILS # BLD AUTO: 3.44 THOUSANDS/ΜL (ref 1.85–7.62)
NEUTS SEG NFR BLD AUTO: 56 % (ref 43–75)
NRBC BLD AUTO-RTO: 0 /100 WBCS
PLATELET # BLD AUTO: 223 THOUSANDS/UL (ref 149–390)
PMV BLD AUTO: 9.9 FL (ref 8.9–12.7)
POTASSIUM SERPL-SCNC: 4.2 MMOL/L (ref 3.5–5.3)
RBC # BLD AUTO: 4.19 MILLION/UL (ref 3.88–5.62)
SODIUM SERPL-SCNC: 140 MMOL/L (ref 136–145)
WBC # BLD AUTO: 6.14 THOUSAND/UL (ref 4.31–10.16)

## 2019-12-16 PROCEDURE — 87389 HIV-1 AG W/HIV-1&-2 AB AG IA: CPT

## 2019-12-16 PROCEDURE — 36415 COLL VENOUS BLD VENIPUNCTURE: CPT

## 2019-12-16 PROCEDURE — 85025 COMPLETE CBC W/AUTO DIFF WBC: CPT

## 2019-12-16 PROCEDURE — 80048 BASIC METABOLIC PNL TOTAL CA: CPT

## 2019-12-16 PROCEDURE — 87086 URINE CULTURE/COLONY COUNT: CPT

## 2019-12-17 ENCOUNTER — TRANSCRIBE ORDERS (OUTPATIENT)
Dept: LAB | Facility: HOSPITAL | Age: 64
End: 2019-12-17

## 2019-12-17 LAB
BACTERIA UR CULT: NORMAL
HIV 1+2 AB+HIV1 P24 AG SERPL QL IA: NORMAL

## 2019-12-23 ENCOUNTER — HOSPITAL ENCOUNTER (OUTPATIENT)
Dept: NON INVASIVE DIAGNOSTICS | Facility: CLINIC | Age: 64
Discharge: HOME | End: 2019-12-23
Payer: COMMERCIAL

## 2019-12-23 DIAGNOSIS — R00.2 PALPITATIONS: ICD-10-CM

## 2019-12-23 DIAGNOSIS — I42.0 CARDIOMYOPATHY, DILATED (HCC): ICD-10-CM

## 2019-12-23 DIAGNOSIS — I50.22 CHRONIC SYSTOLIC CONGESTIVE HEART FAILURE (HCC): ICD-10-CM

## 2019-12-23 PROCEDURE — 93308 TTE F-UP OR LMTD: CPT | Performed by: INTERNAL MEDICINE

## 2019-12-23 PROCEDURE — 93321 DOPPLER ECHO F-UP/LMTD STD: CPT | Performed by: INTERNAL MEDICINE

## 2019-12-23 PROCEDURE — 93226 XTRNL ECG REC<48 HR SCAN A/R: CPT

## 2019-12-23 PROCEDURE — 93225 XTRNL ECG REC<48 HRS REC: CPT

## 2019-12-23 PROCEDURE — 93306 TTE W/DOPPLER COMPLETE: CPT

## 2019-12-23 PROCEDURE — 93325 DOPPLER ECHO COLOR FLOW MAPG: CPT | Performed by: INTERNAL MEDICINE

## 2019-12-26 ENCOUNTER — TELEPHONE (OUTPATIENT)
Dept: CARDIOLOGY CLINIC | Facility: CLINIC | Age: 64
End: 2019-12-26

## 2019-12-26 NOTE — TELEPHONE ENCOUNTER
Call received from [facility]  Please addend OV for clearance  Specifying you reviewed echo and holter results  Pt was cleared for surgery scheduled prior to having echo and holter done   Pt scheduled for TURBT on 1/3/20

## 2019-12-27 PROCEDURE — 93227 XTRNL ECG REC<48 HR R&I: CPT | Performed by: INTERNAL MEDICINE

## 2019-12-30 ENCOUNTER — ANESTHESIA EVENT (OUTPATIENT)
Dept: PERIOP | Facility: HOSPITAL | Age: 64
End: 2019-12-30
Payer: COMMERCIAL

## 2019-12-30 ENCOUNTER — TELEPHONE (OUTPATIENT)
Dept: UROLOGY | Facility: MEDICAL CENTER | Age: 64
End: 2019-12-30

## 2019-12-30 NOTE — TELEPHONE ENCOUNTER
I returned Mellisa's phone call and informed her that Gela Roblero from the [facility] put a call into Dr Sigrid Encinas office to confirm that he reviewed pt 's Holter monitor and Echo results on Friday 12/26/19  The note still has not been updated so we are putting another note in for the office  Jennynnamdi Lewis stated that she was also going to put a call into the office as well to try and help move this along since her husbands surgery in on 1/3/20  She will call me back when she hears back from the office

## 2019-12-30 NOTE — TELEPHONE ENCOUNTER
PAT calling, they need update post Echo and Holter for CC for TURBT to be done on 1/3/20  You did clear him on 11/20 /19, but since he had the testing they need addendum to the [address] [place] [address] note or CC letter          Please advise

## 2019-12-30 NOTE — TELEPHONE ENCOUNTER
His echocardiogram and Holter look fine  His echoes actually showing that his ejection fraction has improved up to the low-normal range  However, I do not understand why I would need to alter my preoperative risk assessment  This testing was not dependent on that and I already filled out    Thank you

## 2019-12-30 NOTE — TELEPHONE ENCOUNTER
Patient of Dr Miley Fine seen in the [place] office  Patient wife called to check on status of test results for clearances  Please advise

## 2020-01-02 NOTE — ANESTHESIA PREPROCEDURE EVALUATION
Review of Systems/Medical History  Patient summary reviewed  Chart reviewed      Cardiovascular  Negative cardio ROS Exercise tolerance (METS): <4,  CAD , CHF ,    Pulmonary  Negative pulmonary ROS COPD moderate- medication dependent , Sleep apnea CPAP,        GI/Hepatic  Negative GI/hepatic ROS   GERD ,        Negative  ROS        Endo/Other  Negative endo/other ROS      GYN  Negative gynecology ROS          Hematology  Negative hematology ROS      Musculoskeletal  Negative musculoskeletal ROS        Neurology  Negative neurology ROS      Psychology   Negative psychology ROS              Physical Exam    Airway    Mallampati score: III  TM Distance: >3 FB  Neck ROM: full     Dental   No notable dental hx     Cardiovascular  Comment: Negative ROS, Rhythm: regular, Rate: normal, Cardiovascular exam normal    Pulmonary  Pulmonary exam normal Wheezes,     Other Findings  Very poor dentition with multiple missing teeth  Remaining teeth very discolored with exposed roots  Anesthesia Plan  ASA Score- 3     Anesthesia Type- spinal and general with ASA Monitors  Additional Monitors:   Airway Plan: ETT  Comment: Plan to maintain native airway with EtCO2 monitoring under spinal anesthesia, general anesthesia with OETT discussed as backup        Plan Factors- Patient instructed to abstain from smoking on day of procedure  Patient did not smoke on day of surgery  Induction-     Postoperative Plan- Plan for postoperative opioid use  Informed Consent- Anesthetic plan and risks discussed with patient  I personally reviewed this patient with the CRNA  Discussed and agreed on the Anesthesia Plan with the CRNA  Sanjana Guallpa

## 2020-01-03 ENCOUNTER — HOSPITAL ENCOUNTER (OUTPATIENT)
Facility: HOSPITAL | Age: 65
Setting detail: OUTPATIENT SURGERY
Discharge: HOME | End: 2020-01-03
Attending: UROLOGY | Admitting: UROLOGY
Payer: COMMERCIAL

## 2020-01-03 ENCOUNTER — TELEPHONE (OUTPATIENT)
Dept: UROLOGY | Facility: AMBULATORY SURGERY CENTER | Age: 65
End: 2020-01-03

## 2020-01-03 ENCOUNTER — ANESTHESIA (OUTPATIENT)
Dept: PERIOP | Facility: HOSPITAL | Age: 65
End: 2020-01-03
Payer: COMMERCIAL

## 2020-01-03 VITALS
TEMPERATURE: 97.6 F | SYSTOLIC BLOOD PRESSURE: 118 MMHG | BODY MASS INDEX: 24.84 KG/M2 | HEIGHT: 62 IN | OXYGEN SATURATION: 95 % | RESPIRATION RATE: 16 BRPM | DIASTOLIC BLOOD PRESSURE: 73 MMHG | HEART RATE: 80 BPM | WEIGHT: 135 LBS

## 2020-01-03 DIAGNOSIS — N32.89 BLADDER MASS: Primary | ICD-10-CM

## 2020-01-03 PROCEDURE — 88307 TISSUE EXAM BY PATHOLOGIST: CPT | Performed by: PATHOLOGY

## 2020-01-03 PROCEDURE — 52234 CYSTOSCOPY AND TREATMENT: CPT | Performed by: UROLOGY

## 2020-01-03 RX ORDER — PROPOFOL 10 MG/ML
INJECTION, EMULSION INTRAVENOUS CONTINUOUS PRN
Status: DISCONTINUED | OUTPATIENT
Start: 2020-01-03 | End: 2020-01-03 | Stop reason: SURG

## 2020-01-03 RX ORDER — MEPERIDINE HYDROCHLORIDE 25 MG/ML
12.5 INJECTION INTRAMUSCULAR; INTRAVENOUS; SUBCUTANEOUS AS NEEDED
Status: DISCONTINUED | OUTPATIENT
Start: 2020-01-03 | End: 2020-01-03 | Stop reason: HOSPADM

## 2020-01-03 RX ORDER — PROMETHAZINE HYDROCHLORIDE 25 MG/ML
12.5 INJECTION, SOLUTION INTRAMUSCULAR; INTRAVENOUS ONCE AS NEEDED
Status: DISCONTINUED | OUTPATIENT
Start: 2020-01-03 | End: 2020-01-03 | Stop reason: HOSPADM

## 2020-01-03 RX ORDER — FENTANYL CITRATE/PF 50 MCG/ML
25 SYRINGE (ML) INJECTION
Status: DISCONTINUED | OUTPATIENT
Start: 2020-01-03 | End: 2020-01-03 | Stop reason: HOSPADM

## 2020-01-03 RX ORDER — HYDROCODONE BITARTRATE AND ACETAMINOPHEN 5; 325 MG/1; MG/1
1 TABLET ORAL EVERY 4 HOURS PRN
Qty: 5 TABLET | Refills: 0 | Status: SHIPPED | OUTPATIENT
Start: 2020-01-03 | End: 2020-01-05

## 2020-01-03 RX ORDER — ONDANSETRON 2 MG/ML
4 INJECTION INTRAMUSCULAR; INTRAVENOUS ONCE AS NEEDED
Status: DISCONTINUED | OUTPATIENT
Start: 2020-01-03 | End: 2020-01-03 | Stop reason: HOSPADM

## 2020-01-03 RX ORDER — SODIUM CHLORIDE, SODIUM LACTATE, POTASSIUM CHLORIDE, CALCIUM CHLORIDE 600; 310; 30; 20 MG/100ML; MG/100ML; MG/100ML; MG/100ML
50 INJECTION, SOLUTION INTRAVENOUS CONTINUOUS
Status: DISCONTINUED | OUTPATIENT
Start: 2020-01-03 | End: 2020-01-03 | Stop reason: HOSPADM

## 2020-01-03 RX ORDER — PHENYLEPHRINE HYDROCHLORIDE 10 MG/ML
0.05 INJECTION INTRAVENOUS
Status: DISCONTINUED | OUTPATIENT
Start: 2020-01-03 | End: 2020-01-03 | Stop reason: HOSPADM

## 2020-01-03 RX ORDER — PROMETHAZINE HYDROCHLORIDE 25 MG/ML
12.5 INJECTION, SOLUTION INTRAMUSCULAR; INTRAVENOUS ONCE AS NEEDED
Status: DISCONTINUED | OUTPATIENT
Start: 2020-01-03 | End: 2020-01-03

## 2020-01-03 RX ORDER — CEFAZOLIN SODIUM 1 G/3ML
INJECTION, POWDER, FOR SOLUTION INTRAMUSCULAR; INTRAVENOUS AS NEEDED
Status: DISCONTINUED | OUTPATIENT
Start: 2020-01-03 | End: 2020-01-03 | Stop reason: SURG

## 2020-01-03 RX ORDER — ALBUTEROL SULFATE 2.5 MG/3ML
2.5 SOLUTION RESPIRATORY (INHALATION) ONCE
Status: COMPLETED | OUTPATIENT
Start: 2020-01-03 | End: 2020-01-03

## 2020-01-03 RX ORDER — BUPIVACAINE HYDROCHLORIDE 7.5 MG/ML
INJECTION, SOLUTION INTRASPINAL AS NEEDED
Status: DISCONTINUED | OUTPATIENT
Start: 2020-01-03 | End: 2020-01-03 | Stop reason: SURG

## 2020-01-03 RX ORDER — LIDOCAINE HYDROCHLORIDE 20 MG/ML
INJECTION, SOLUTION EPIDURAL; INFILTRATION; INTRACAUDAL; PERINEURAL AS NEEDED
Status: DISCONTINUED | OUTPATIENT
Start: 2020-01-03 | End: 2020-01-03 | Stop reason: SURG

## 2020-01-03 RX ORDER — LABETALOL 20 MG/4 ML (5 MG/ML) INTRAVENOUS SYRINGE
5
Status: DISCONTINUED | OUTPATIENT
Start: 2020-01-03 | End: 2020-01-03 | Stop reason: HOSPADM

## 2020-01-03 RX ORDER — CEFAZOLIN SODIUM 1 G/50ML
1000 SOLUTION INTRAVENOUS ONCE
Status: DISCONTINUED | OUTPATIENT
Start: 2020-01-03 | End: 2020-01-03 | Stop reason: HOSPADM

## 2020-01-03 RX ORDER — ALBUTEROL SULFATE 2.5 MG/3ML
2.5 SOLUTION RESPIRATORY (INHALATION) ONCE AS NEEDED
Status: DISCONTINUED | OUTPATIENT
Start: 2020-01-03 | End: 2020-01-03 | Stop reason: HOSPADM

## 2020-01-03 RX ORDER — SODIUM CHLORIDE FOR INHALATION 0.9 %
VIAL, NEBULIZER (ML) INHALATION
Status: DISCONTINUED
Start: 2020-01-03 | End: 2020-01-03 | Stop reason: HOSPADM

## 2020-01-03 RX ORDER — MAGNESIUM HYDROXIDE 1200 MG/15ML
LIQUID ORAL AS NEEDED
Status: DISCONTINUED | OUTPATIENT
Start: 2020-01-03 | End: 2020-01-03 | Stop reason: HOSPADM

## 2020-01-03 RX ORDER — SODIUM CHLORIDE, SODIUM LACTATE, POTASSIUM CHLORIDE, CALCIUM CHLORIDE 600; 310; 30; 20 MG/100ML; MG/100ML; MG/100ML; MG/100ML
125 INJECTION, SOLUTION INTRAVENOUS CONTINUOUS
Status: DISCONTINUED | OUTPATIENT
Start: 2020-01-03 | End: 2020-01-03 | Stop reason: HOSPADM

## 2020-01-03 RX ORDER — CEPHALEXIN 500 MG/1
500 CAPSULE ORAL EVERY 12 HOURS SCHEDULED
Qty: 6 CAPSULE | Refills: 0 | Status: SHIPPED | OUTPATIENT
Start: 2020-01-03 | End: 2020-01-06

## 2020-01-03 RX ORDER — HYDROCODONE BITARTRATE AND ACETAMINOPHEN 5; 325 MG/1; MG/1
1 TABLET ORAL EVERY 6 HOURS PRN
Status: DISCONTINUED | OUTPATIENT
Start: 2020-01-03 | End: 2020-01-03 | Stop reason: HOSPADM

## 2020-01-03 RX ORDER — HYDROMORPHONE HCL/PF 1 MG/ML
0.5 SYRINGE (ML) INJECTION
Status: DISCONTINUED | OUTPATIENT
Start: 2020-01-03 | End: 2020-01-03 | Stop reason: HOSPADM

## 2020-01-03 RX ORDER — LIDOCAINE HYDROCHLORIDE 10 MG/ML
0.5 INJECTION, SOLUTION EPIDURAL; INFILTRATION; INTRACAUDAL; PERINEURAL ONCE AS NEEDED
Status: COMPLETED | OUTPATIENT
Start: 2020-01-03 | End: 2020-01-03

## 2020-01-03 RX ORDER — LIDOCAINE HYDROCHLORIDE 10 MG/ML
0.5 INJECTION, SOLUTION EPIDURAL; INFILTRATION; INTRACAUDAL; PERINEURAL ONCE AS NEEDED
Status: DISCONTINUED | OUTPATIENT
Start: 2020-01-03 | End: 2020-01-03 | Stop reason: HOSPADM

## 2020-01-03 RX ORDER — MIDAZOLAM HYDROCHLORIDE 2 MG/2ML
INJECTION, SOLUTION INTRAMUSCULAR; INTRAVENOUS AS NEEDED
Status: DISCONTINUED | OUTPATIENT
Start: 2020-01-03 | End: 2020-01-03 | Stop reason: SURG

## 2020-01-03 RX ADMIN — HYDROCODONE BITARTRATE AND ACETAMINOPHEN 1 TABLET: 5; 325 TABLET ORAL at 11:57

## 2020-01-03 RX ADMIN — CEFAZOLIN 1000 MG: 1 INJECTION, POWDER, FOR SOLUTION INTRAVENOUS at 09:44

## 2020-01-03 RX ADMIN — LIDOCAINE HYDROCHLORIDE 4 ML: 20 INJECTION, SOLUTION EPIDURAL; INFILTRATION; INTRACAUDAL; PERINEURAL at 09:42

## 2020-01-03 RX ADMIN — SODIUM CHLORIDE, SODIUM LACTATE, POTASSIUM CHLORIDE, AND CALCIUM CHLORIDE: .6; .31; .03; .02 INJECTION, SOLUTION INTRAVENOUS at 09:32

## 2020-01-03 RX ADMIN — PHENYLEPHRINE HYDROCHLORIDE 0.05 MG: 10 INJECTION INTRAVENOUS at 10:34

## 2020-01-03 RX ADMIN — ALBUTEROL SULFATE 2.5 MG: 2.5 SOLUTION RESPIRATORY (INHALATION) at 09:06

## 2020-01-03 RX ADMIN — MIDAZOLAM 2 MG: 1 INJECTION INTRAMUSCULAR; INTRAVENOUS at 09:30

## 2020-01-03 RX ADMIN — LIDOCAINE HYDROCHLORIDE 0.5 ML: 10 INJECTION, SOLUTION EPIDURAL; INFILTRATION; INTRACAUDAL; PERINEURAL at 07:58

## 2020-01-03 RX ADMIN — PHENYLEPHRINE HYDROCHLORIDE 0.05 MG: 10 INJECTION INTRAVENOUS at 10:48

## 2020-01-03 RX ADMIN — SODIUM CHLORIDE, SODIUM LACTATE, POTASSIUM CHLORIDE, AND CALCIUM CHLORIDE 125 ML/HR: .6; .31; .03; .02 INJECTION, SOLUTION INTRAVENOUS at 07:58

## 2020-01-03 RX ADMIN — BUPIVACAINE HYDROCHLORIDE IN DEXTROSE 1.4 ML: 7.5 INJECTION, SOLUTION SUBARACHNOID at 09:42

## 2020-01-03 RX ADMIN — PHENYLEPHRINE HYDROCHLORIDE 100 MCG: 10 INJECTION INTRAVENOUS at 10:22

## 2020-01-03 RX ADMIN — PROPOFOL 20 MCG/KG/MIN: 10 INJECTION, EMULSION INTRAVENOUS at 09:51

## 2020-01-03 RX ADMIN — PHENYLEPHRINE HYDROCHLORIDE 40 MCG/MIN: 10 INJECTION INTRAVENOUS at 09:45

## 2020-01-03 RX ADMIN — SODIUM CHLORIDE, SODIUM LACTATE, POTASSIUM CHLORIDE, AND CALCIUM CHLORIDE: .6; .31; .03; .02 INJECTION, SOLUTION INTRAVENOUS at 10:12

## 2020-01-03 NOTE — OP NOTE
OPERATIVE REPORT  PATIENT NAME: Sabino Aguilar  :  1955  MRN: [id number]  Pt Location: [facility] 01    SURGERY DATE: 1/3/2020    Surgeon(s) and Role:     * Ana Rosa Chisholm MD - Primary    Preop Diagnosis:  Bladder mass [N32 89]    Post-Op Diagnosis Codes: * Bladder mass [N32 89]    Procedure(s) (LRB):  TRANSURETHRAL RESECTION OF BLADDER TUMOR (TURBT) (N/A)  INSTILLATION MITOMYCIN (N/A)   URETHRAL DILATION    Specimen(s):  ID Type Source Tests Collected by Time Destination   1 : bladder tumor Tissue Urinary Bladder TISSUE EXAM Ana Rosa Chisholm MD 1/3/2020 1001        Estimated Blood Loss:   Minimal    Drains:  Urethral Catheter Double-lumen 18 Fr  (Active)   Number of days: 0       Anesthesia Type:   General    Operative Indications:  Bladder mass [N32 89]      Operative Findings:  Bladder tumor left floor, 1 cm, completely resected  Mitomycin 40 mg instilled  Complications:   None    Procedure and Technique:  The patient was identified, brought to the operating room  He was administered a spinal anesthetic and then placed on the table in supine position  After confirming adequate anesthesia, he is placed in the dorsal lithotomy position and prepped and draped in the usual sterile fashion  A formal time-out was performed  The [address] [place] rigid cystoscope would not pass due to meatal stricture  Serial dilation was performed from [address] to [address]  The cystoscope was then placed and the tumor confirmed as previously identified  There was no evidence of additional synchronous lesions  He does have a median lobe of the prostate noted  The scope was removed and further dilation to [address] was necessary  At this point the resectoscope was placed  Transurethral resection of the bladder tumor was performed to completion  There was no perforation noted  All specimen was retrieved and confirmed  Hemostasis was achieved and confirmed with electrocautery      At this point an 25 [facility] Stanley catheter was placed  There was mild bleeding from the meatus at the site of dilation however no bleeding from the catheter  The bladder was further irrigated and completely clear  At this point mitomycin 40 mg/40 mL was instilled through the Stanley catheter and the catheter was plugged  The patient tolerated this procedure well and transferred to the recovery room awake alert and stable condition     I was present for the entire procedure    Patient Disposition:  PACU     SIGNATURE: Aden Jean Baptiste MD  DATE: January 3, 2020  TIME: 10:22 AM

## 2020-01-03 NOTE — TELEPHONE ENCOUNTER
Post Op Note    Silvia Sykes  is a 60 y o  male s/pTRANSURETHRAL RESECTION OF BLADDER TUMOR (TURBT) (N/A)  INSTILLATION MITOMYCIN URETHRAL DILATION  performed 1/3/2020  Silvia Sykes  is a patient of Dr Penny Diggs and is seen at the [place] office  Will call patient Monday 1/6/2020 to check on patient   Patient is scheduled for post op 1/15/2020

## 2020-01-03 NOTE — INTERVAL H&P NOTE
H&P reviewed  After examining the patient I find no changes in the patients condition since the H&P had been written  Vitals:    01/03/20 0711   BP: 103/62   Pulse: 88   Resp: 16   Temp: (!) 97 2 °F (36 2 °C)   SpO2: 95%     See also my note from 11/15/2019  Will plan to proceed with TURBT and mitomycin instillation as long as no perforation of the bladder wall

## 2020-01-03 NOTE — ANESTHESIA POSTPROCEDURE EVALUATION
Post-Op Assessment Note    CV Status:  Stable  Pain Score: 0    Pain management: adequate     Mental Status:  Alert   Hydration Status:  Stable   PONV Controlled:  None   Airway Patency:  Patent   Post Op Vitals Reviewed: Yes      Staff: CRNA           BP   108/61   Temp 98 3 °F (36 8 °C) (01/03/20 1020)    Pulse 68 (01/03/20 1020)   Resp 18 (01/03/20 1020)    SpO2 97 % (01/03/20 1020)

## 2020-01-03 NOTE — ANESTHESIA PROCEDURE NOTES
Spinal Block    Patient location during procedure: OR  Start time: 1/3/2020 9:42 AM  Reason for block: procedure for pain and at surgeon's request  Preanesthetic Checklist  Completed: patient identified, site marked, surgical consent, pre-op evaluation, timeout performed, IV checked, risks and benefits discussed and monitors and equipment checked  Spinal Block  Patient position: sitting  Prep: ChloraPrep  Patient monitoring: cardiac monitor and frequent blood pressure checks  Approach: midline  Location: L3-4  Injection technique: single-shot  Needle  Needle type: pencil-tip   Needle gauge: 25 G  Needle length: 10 cm  Assessment  Sensory level: T4  Injection Assessment:  negative aspiration for heme, no paresthesia on injection and positive aspiration for clear CSF    Post-procedure:  adhesive bandage applied, pressure dressing applied, secured with tape, site cleaned and sterile dressing applied

## 2020-01-06 NOTE — TELEPHONE ENCOUNTER
Post Op Note    Rosa Naik  is a 60 y o  male s/pTRANSURETHRAL RESECTION OF BLADDER TUMOR (TURBT) (N/A)  INSTILLATION MITOMYCIN URETHRAL DILATION  performed 1/3/2020  Rosa Naik  is a patient of Dr Codi Francis and is seen at the [place] office  How would you rate your pain on a scale from 1 to 10, 10 being the worst pain ever? 2  Patient states he continues to have some burning with urination  Advised patient to continue to hydrate well with water to flush his system after Mitomycin  Have you had a fever? No  Have your bowel movements been regular? Yes  Do you have any difficulty urinating? No    Do you have any other questions or concerns that I can address at this time? No      Confirmed post op appointment and encouraged patient to call office in meantime with any concerns

## 2020-01-09 DIAGNOSIS — J44.9 CHRONIC OBSTRUCTIVE PULMONARY DISEASE, UNSPECIFIED COPD TYPE (HCC): ICD-10-CM

## 2020-01-14 DIAGNOSIS — M81.6 LOCALIZED OSTEOPOROSIS WITHOUT CURRENT PATHOLOGICAL FRACTURE: ICD-10-CM

## 2020-01-14 RX ORDER — ALENDRONATE SODIUM 70 MG/1
TABLET ORAL
Qty: 12 TABLET | Refills: 1 | Status: SHIPPED | OUTPATIENT
Start: 2020-01-14

## 2020-01-15 ENCOUNTER — OFFICE VISIT (OUTPATIENT)
Dept: UROLOGY | Facility: AMBULATORY SURGERY CENTER | Age: 65
End: 2020-01-15
Payer: COMMERCIAL

## 2020-01-15 ENCOUNTER — TELEPHONE (OUTPATIENT)
Dept: UROLOGY | Facility: AMBULATORY SURGERY CENTER | Age: 65
End: 2020-01-15

## 2020-01-15 VITALS
HEART RATE: 75 BPM | WEIGHT: 131 LBS | BODY MASS INDEX: 24.11 KG/M2 | HEIGHT: 62 IN | DIASTOLIC BLOOD PRESSURE: 70 MMHG | SYSTOLIC BLOOD PRESSURE: 122 MMHG

## 2020-01-15 DIAGNOSIS — C67.0 MALIGNANT NEOPLASM OF TRIGONE OF URINARY BLADDER (HCC): Primary | ICD-10-CM

## 2020-01-15 PROCEDURE — 99213 OFFICE O/P EST LOW 20 MIN: CPT | Performed by: UROLOGY

## 2020-01-15 NOTE — PROGRESS NOTES
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    Plan  Reviewed the pathology  Fortunately, this reveals low-grade, superficial urothelial carcinoma the bladder  I explained that there is still a relatively high rate of recurrence despite mitomycin therapy and resection  Additionally, recurrence may be more aggressive and potentially even invasive  Adherence to the surveillance protocol was stressed  They understand the follow-up cystoscopy in 3 months is appropriate, and every 3 months for at least 1 year  Without increase the interval   Will also recommend CT scan imaging within 1 year as well and in the future  All questions answered to their satisfaction and they agree with the plan  Total visit time was 15 minutes of which over 50% was spent on counseling  History of Present Illness  Olga Nelson is a 60 y o  male recently status post TURBT for 1 cm urothelial carcinoma the bladder  He received mitomycin postoperatively  He complained of burning with urination as well as hematuria for about 2 days afterwards  Now he is doing well without complaint  We reviewed the pathology report together in the office today  Review of Systems  Review of Systems   Constitutional: Negative  HENT: Negative  Respiratory: Negative  Cardiovascular: Negative  Gastrointestinal: Negative  Genitourinary:        As per HPI   Musculoskeletal: Negative  Skin: Negative  Neurological: Negative  Hematological: Negative            Past Medical History  Past Medical History:   Diagnosis Date    Bladder mass     Cardiomyopathy [facility])     Chest pain     COPD (chronic obstructive pulmonary disease) (HCC)     Emphysema of lung (HCC)     Hypoxia     nocturnal    Left bundle branch block     Multiple pulmonary nodules     last assessed: 10/12/16    Smoker        Past Social History  Past Surgical History:   Procedure Laterality Date    CARDIAC CATHETERIZATION      CYSTOSCOPY      AL CYSTOURETHROSCOPY,FULGUR <0 5 CM LESN N/A 1/3/2020    Procedure: TRANSURETHRAL RESECTION OF BLADDER TUMOR (TURBT);   Surgeon: Pelon Mascorro MD;  Location: [facility];  Service: Urology    AL INSTILL ANTICANCER AGENT IN BLADDER N/A 1/3/2020    Procedure: Kaleb April;  Surgeon: Pelon Mascorro MD;  Location: [facility];  Service: Urology       Past Family History  Family History   Problem Relation Age of Onset    Diabetes Mother        Past Social history  Social History     Socioeconomic History    Marital status: /Civil Union     Spouse name: Not on file    Number of children: Not on file    Years of education: Not on file    Highest education level: Not on file   Occupational History    Not on file   Social Needs    Financial resource strain: Not on file    Food insecurity:     Worry: Not on file     Inability: Not on file    Transportation needs:     Medical: Not on file     Non-medical: Not on file   Tobacco Use    Smoking status: Former Smoker     Packs/day: 2 50     Years: 37 00     Pack years: 92 50     Types: Cigarettes     Start date:      Last attempt to quit:      Years since quittin 0    Smokeless tobacco: Former User    Tobacco comment: 1 ppd for 37 years, 2010 down to 5 cigs a day, is around second hand smoke   Substance and Sexual Activity    Alcohol use: Not Currently     Comment: rarely    Drug use: No    Sexual activity: Not on file   Lifestyle    Physical activity:     Days per week: Not on file     Minutes per session: Not on file    Stress: Not on file   Relationships    Social connections:     Talks on phone: Not on file     Gets together: Not on file     Attends Bahai service: Not on file     Active member of club or organization: Not on file     Attends meetings of clubs or organizations: Not on file     Relationship status: Not on file    Intimate partner violence: Fear of current or ex partner: Not on file     Emotionally abused: Not on file     Physically abused: Not on file     Forced sexual activity: Not on file   Other Topics Concern    Not on file   Social History Narrative    Daily coffee consumption: 8 or more cups a day         Social History     Tobacco Use   Smoking Status Former Smoker    Packs/day: 2 50    Years: 37 00    Pack years: 92 50    Types: Cigarettes    Start date:     Last attempt to quit: 2012    Years since quittin 0   Smokeless Tobacco Former User   Tobacco Comment    1 ppd for 37 years, 2010 down to 5 cigs a day, is around second hand smoke       Current Medications  Current Outpatient Medications   Medication Sig Dispense Refill    albuterol (2 5 mg/3 mL) 0 083 % nebulizer solution Take 1 vial (2 5 mg total) by nebulization 4 (four) times a day 120 vial 5    albuterol (VENTOLIN HFA) 90 mcg/act inhaler Inhale 2 puffs every 6 (six) hours as needed for wheezing 1 Inhaler 6    alendronate (FOSAMAX) 70 mg tablet TAKE 1 TABLET BY MOUTH ONE TIME PER WEEK 12 tablet 1    aspirin 81 MG tablet Take 1 tablet by mouth daily      fluticasone-umeclidinium-vilanterol (TRELEGY ELLIPTA) 100-62 5-25 MCG/INH inhaler Inhale 1 puff daily Rinse mouth after use  1 Inhaler 11    ipratropium (ATROVENT) 0 02 % nebulizer solution TAKE 1 VIAL (0 5 MG TOTAL) BY NEBULIZATION 4 (FOUR) TIMES A  5 mL 0    pantoprazole (PROTONIX) 40 mg tablet Take by mouth Daily      predniSONE 10 mg tablet Take 2 tablets by mouth daily      rosuvastatin (CRESTOR) 10 MG tablet TAKE 1 TABLET EVERY DAY 90 tablet 1     No current facility-administered medications for this visit  Allergies  No Known Allergies    Past Medical History, Social History, Family History, medications and allergies were reviewed      Vitals  Vitals:    01/15/20 1128   BP: 122/70   Pulse: 75   Weight: 59 4 kg (131 lb)   Height: 5' 2" (1 575 m)       Physical Exam  Physical Exam Constitutional: He is oriented to person, place, and time  He appears well-developed and well-nourished  Cardiovascular: Normal rate  Pulmonary/Chest: Effort normal    Abdominal: Soft  Musculoskeletal: Normal range of motion  Neurological: He is alert and oriented to person, place, and time  Skin: Skin is warm, dry and intact  Psychiatric: He has a normal mood and affect  Vitals reviewed          Results  Lab Results   Component Value Date    PSA 1 6 08/05/2019    PSA 1 8 08/21/2017    PSA 2 3 08/15/2016     Lab Results   Component Value Date    GLUCOSE 90 08/17/2015    CALCIUM 9 5 12/16/2019     08/17/2015    K 4 2 12/16/2019    CO2 29 12/16/2019     12/16/2019    BUN 23 12/16/2019    CREATININE 1 16 12/16/2019     Lab Results   Component Value Date    WBC 6 14 12/16/2019    HGB 13 6 12/16/2019    HCT 42 4 12/16/2019     (H) 12/16/2019     12/16/2019

## 2020-02-03 ENCOUNTER — OFFICE VISIT (OUTPATIENT)
Dept: PULMONOLOGY | Facility: CLINIC | Age: 65
End: 2020-02-03
Payer: COMMERCIAL

## 2020-02-03 VITALS
SYSTOLIC BLOOD PRESSURE: 110 MMHG | OXYGEN SATURATION: 93 % | WEIGHT: 126 LBS | HEART RATE: 103 BPM | BODY MASS INDEX: 23.19 KG/M2 | TEMPERATURE: 98.3 F | HEIGHT: 62 IN | DIASTOLIC BLOOD PRESSURE: 60 MMHG

## 2020-02-03 DIAGNOSIS — F17.201 TOBACCO USE DISORDER, MODERATE, IN SUSTAINED REMISSION, DEPENDENCE: Primary | ICD-10-CM

## 2020-02-03 DIAGNOSIS — J44.9 CHRONIC OBSTRUCTIVE PULMONARY DISEASE, UNSPECIFIED COPD TYPE (HCC): ICD-10-CM

## 2020-02-03 DIAGNOSIS — J44.9 STAGE 3 SEVERE COPD BY GOLD CLASSIFICATION (HCC): ICD-10-CM

## 2020-02-03 DIAGNOSIS — M81.6 LOCALIZED OSTEOPOROSIS WITHOUT CURRENT PATHOLOGICAL FRACTURE: ICD-10-CM

## 2020-02-03 DIAGNOSIS — E55.9 VITAMIN D DEFICIENCY: ICD-10-CM

## 2020-02-03 DIAGNOSIS — G47.33 OBSTRUCTIVE SLEEP APNEA: ICD-10-CM

## 2020-02-03 PROCEDURE — 99213 OFFICE O/P EST LOW 20 MIN: CPT | Performed by: INTERNAL MEDICINE

## 2020-02-03 PROCEDURE — 1036F TOBACCO NON-USER: CPT | Performed by: INTERNAL MEDICINE

## 2020-02-03 PROCEDURE — 3008F BODY MASS INDEX DOCD: CPT | Performed by: INTERNAL MEDICINE

## 2020-02-03 RX ORDER — ALBUTEROL SULFATE 90 UG/1
2 AEROSOL, METERED RESPIRATORY (INHALATION) EVERY 6 HOURS PRN
Qty: 1 INHALER | Refills: 0 | Status: SHIPPED | OUTPATIENT
Start: 2020-02-03 | End: 2021-02-25 | Stop reason: SDUPTHER

## 2020-02-03 RX ORDER — ALBUTEROL SULFATE 2.5 MG/3ML
2.5 SOLUTION RESPIRATORY (INHALATION) 4 TIMES DAILY
Qty: 120 VIAL | Refills: 5 | Status: SHIPPED | OUTPATIENT
Start: 2020-02-03 | End: 2020-07-16

## 2020-02-03 RX ORDER — AZITHROMYCIN 250 MG/1
250 TABLET, FILM COATED ORAL EVERY 24 HOURS
Qty: 30 TABLET | Refills: 6 | Status: SHIPPED | OUTPATIENT
Start: 2020-02-03 | End: 2020-03-04

## 2020-02-03 RX ORDER — ERGOCALCIFEROL 1.25 MG/1
50000 CAPSULE ORAL
Qty: 3 CAPSULE | Refills: 0 | Status: SHIPPED | OUTPATIENT
Start: 2020-02-03 | End: 2020-04-20

## 2020-02-03 NOTE — PROGRESS NOTES
Assessment:    Obstructive sleep apnea  Will continue on CPAP with supplemental oxygen every night while sleeping  He seems to be doing well with this, no issues,   Continue current settings    Stage 3 severe COPD by GOLD classification ([address] )   He remains stable from a respiratory standpoint  He was evaluated by transplant and we all agree that at this time he is not appropriate for lung transplant  He is not interested in evaluating for endobronchial valves  I think mostly right now he is concentrating on treatment of urologic issues  Medications:   -continue Trelegy 1 puff once daily-rinse mouth out after use  -nebulize albuterol and ipratropium 4 times daily  -continue Zithromax daily    -continue prednisone 10 mg daily   Pulmonary rehab:     Unable to afford because of co-pay issues   Immunizations:     Up-to-date on influenza and pneumonia vaccines   Lung cancer screening CT:    I ordered for August 2020   Other testing:    Not needed at this time        Plan:    Diagnoses and all orders for this visit:    Tobacco use disorder, moderate, in sustained remission, dependence  -     CT lung screening program; Future    Chronic obstructive pulmonary disease, unspecified COPD type (HCC)  -     albuterol (2 5 mg/3 mL) 0 083 % nebulizer solution; Take 1 vial (2 5 mg total) by nebulization 4 (four) times a day  -     ipratropium (ATROVENT) 0 02 % nebulizer solution; Take 1 vial (0 5 mg total) by nebulization 4 (four) times a day  -     azithromycin (ZITHROMAX) 250 mg tablet; Take 1 tablet (250 mg total) by mouth every 24 hours  -     albuterol (VENTOLIN HFA) 90 mcg/act inhaler;  Inhale 2 puffs every 6 (six) hours as needed for wheezing    Stage 3 severe COPD by GOLD classification ([address] )    Obstructive sleep apnea        Follow-up:  6 months      HPI:  He is s/p TURBT in early [place]  No post-operative pulmonary complications    No change in respiratory symptoms - chronic dyspnea on exertion, cough, wheeze and chest tightness  He remains on Trelegy on puff once daily  He is doing ipatroprium nebulizer 4 times daily  He lost his rescue inhaler last night  He is taking prednisone 10mg daily - no change in symptoms with decrease  Taking zithromax daily    No pulmonary rehab because of copay/financial issues    He continues to use CPAP at night      Review of Systems    The following portions of the patient's history were reviewed and updated as appropriate: allergies, current medications, past family history, past medical history, past social history, past surgical history and problem list     VITALS:  Vitals:    02/03/20 0816   BP: 110/60   BP Location: Left arm   Patient Position: Sitting   Pulse: 103   Temp: 98 3 °F (36 8 °C)   TempSrc: Tympanic   SpO2: 93%   Weight: 57 2 kg (126 lb)   Height: 5' 2" (1 575 m)       Physical Exam  General:  Patient is awake, alert, non-toxic and in no acute respiratory distress  Neck: No JVD  CV:  Regular, +S1 and S2, No murmurs, gallops or rubs appreciated  Lungs:  Decreased breath sounds in all lung fields with diffuse expiratory wheeze bilateral  Abdomen: Soft, +BS, Non-tender, non-distended  Extremities: No clubbing, cyanosis or edema  Neuro: No focal deficits        Diagnostic Testing:    CBC:  Lab Results   Component Value Date    WBC 6 14 12/16/2019    HGB 13 6 12/16/2019    HCT 42 4 12/16/2019     (H) 12/16/2019     12/16/2019         BMP:   Lab Results   Component Value Date     08/17/2015    K 4 2 12/16/2019     12/16/2019    CO2 29 12/16/2019    ANIONGAP 6 08/17/2015    BUN 23 12/16/2019    CREATININE 1 16 12/16/2019    GLUCOSE 90 08/17/2015    GLUF 72 12/16/2019    CALCIUM 9 5 12/16/2019    AST 20 02/11/2019    ALT 22 02/11/2019    ALKPHOS 78 02/11/2019    PROT 6 8 08/17/2015    BILITOT 0 60 08/17/2015    EGFR 67 12/16/2019     Kaden HooderDO

## 2020-02-03 NOTE — ASSESSMENT & PLAN NOTE
He remains stable from a respiratory standpoint  He was evaluated by transplant and we all agree that at this time he is not appropriate for lung transplant  He is not interested in evaluating for endobronchial valves  I think mostly right now he is concentrating on treatment of urologic issues       Medications:   -continue Trelegy 1 puff once daily-rinse mouth out after use  -nebulize albuterol and ipratropium 4 times daily  -continue Zithromax daily    -continue prednisone 10 mg daily   Pulmonary rehab:     Unable to afford because of co-pay issues   Immunizations:     Up-to-date on influenza and pneumonia vaccines   Lung cancer screening CT:    I ordered for August 2020   Other testing:    Not needed at this time

## 2020-02-03 NOTE — PATIENT INSTRUCTIONS
-continue Trelegy 1 puff once daily-rinse mouth out after use  -nebulize albuterol and ipratropium 4 times daily  -continue Zithromax daily    -continue prednisone 10 mg daily

## 2020-02-03 NOTE — ASSESSMENT & PLAN NOTE
Will continue on CPAP with supplemental oxygen every night while sleeping    He seems to be doing well with this, no issues,   Continue current settings

## 2020-02-05 DIAGNOSIS — E78.2 MIXED HYPERLIPIDEMIA: ICD-10-CM

## 2020-02-05 RX ORDER — ROSUVASTATIN CALCIUM 10 MG/1
TABLET, COATED ORAL
Qty: 90 TABLET | Refills: 1 | Status: SHIPPED | OUTPATIENT
Start: 2020-02-05 | End: 2020-07-27

## 2020-02-25 DIAGNOSIS — J44.9 COPD, VERY SEVERE (HCC): ICD-10-CM

## 2020-02-25 RX ORDER — ALBUTEROL SULFATE 90 UG/1
AEROSOL, METERED RESPIRATORY (INHALATION)
Qty: 6.7 INHALER | Refills: 6 | Status: SHIPPED | OUTPATIENT
Start: 2020-02-25 | End: 2020-11-23

## 2020-04-06 ENCOUNTER — TELEPHONE (OUTPATIENT)
Dept: UROLOGY | Facility: CLINIC | Age: 65
End: 2020-04-06

## 2020-04-20 DIAGNOSIS — E55.9 VITAMIN D DEFICIENCY: ICD-10-CM

## 2020-04-20 DIAGNOSIS — M81.6 LOCALIZED OSTEOPOROSIS WITHOUT CURRENT PATHOLOGICAL FRACTURE: ICD-10-CM

## 2020-04-20 RX ORDER — ERGOCALCIFEROL 1.25 MG/1
50000 CAPSULE ORAL
Qty: 3 CAPSULE | Refills: 1 | Status: SHIPPED | OUTPATIENT
Start: 2020-04-20 | End: 2020-09-30

## 2020-05-05 ENCOUNTER — TELEMEDICINE (OUTPATIENT)
Dept: CARDIOLOGY CLINIC | Facility: CLINIC | Age: 65
End: 2020-05-05
Payer: COMMERCIAL

## 2020-05-05 DIAGNOSIS — I44.7 LEFT BUNDLE-BRANCH BLOCK: ICD-10-CM

## 2020-05-05 DIAGNOSIS — E78.00 HYPERCHOLESTEROLEMIA: ICD-10-CM

## 2020-05-05 DIAGNOSIS — G47.33 OBSTRUCTIVE SLEEP APNEA: ICD-10-CM

## 2020-05-05 DIAGNOSIS — J44.9 STAGE 3 SEVERE COPD BY GOLD CLASSIFICATION (HCC): ICD-10-CM

## 2020-05-05 DIAGNOSIS — I25.10 CORONARY ARTERY DISEASE INVOLVING NATIVE CORONARY ARTERY OF NATIVE HEART WITHOUT ANGINA PECTORIS: ICD-10-CM

## 2020-05-05 DIAGNOSIS — I50.22 CHRONIC SYSTOLIC CONGESTIVE HEART FAILURE (HCC): ICD-10-CM

## 2020-05-05 DIAGNOSIS — I42.0 CARDIOMYOPATHY, DILATED (HCC): Primary | ICD-10-CM

## 2020-05-05 PROCEDURE — 99214 OFFICE O/P EST MOD 30 MIN: CPT | Performed by: INTERNAL MEDICINE

## 2020-05-18 ENCOUNTER — TRANSCRIBE ORDERS (OUTPATIENT)
Dept: ADMINISTRATIVE | Age: 65
End: 2020-05-18

## 2020-05-18 ENCOUNTER — APPOINTMENT (OUTPATIENT)
Dept: LAB | Age: 65
End: 2020-05-18
Payer: COMMERCIAL

## 2020-05-18 DIAGNOSIS — E55.9 VITAMIN D DEFICIENCY: ICD-10-CM

## 2020-05-18 DIAGNOSIS — E78.00 HYPERCHOLESTEROLEMIA: ICD-10-CM

## 2020-05-18 DIAGNOSIS — D75.89 MACROCYTOSIS WITHOUT ANEMIA: ICD-10-CM

## 2020-05-18 DIAGNOSIS — J44.9 STAGE 3 SEVERE COPD BY GOLD CLASSIFICATION (HCC): ICD-10-CM

## 2020-05-18 DIAGNOSIS — Z11.4 SCREENING FOR HUMAN IMMUNODEFICIENCY VIRUS: Primary | ICD-10-CM

## 2020-05-18 DIAGNOSIS — Z11.4 SCREENING FOR HUMAN IMMUNODEFICIENCY VIRUS: ICD-10-CM

## 2020-05-18 LAB
25(OH)D3 SERPL-MCNC: 82.5 NG/ML (ref 30–100)
ALBUMIN SERPL BCP-MCNC: 4.1 G/DL (ref 3.5–5)
ALP SERPL-CCNC: 64 U/L (ref 46–116)
ALT SERPL W P-5'-P-CCNC: 24 U/L (ref 12–78)
ANION GAP SERPL CALCULATED.3IONS-SCNC: 3 MMOL/L (ref 4–13)
AST SERPL W P-5'-P-CCNC: 18 U/L (ref 5–45)
BASOPHILS # BLD AUTO: 0.08 THOUSANDS/ΜL (ref 0–0.1)
BASOPHILS NFR BLD AUTO: 1 % (ref 0–1)
BILIRUB SERPL-MCNC: 0.69 MG/DL (ref 0.2–1)
BUN SERPL-MCNC: 19 MG/DL (ref 5–25)
CALCIUM SERPL-MCNC: 9.3 MG/DL (ref 8.3–10.1)
CHLORIDE SERPL-SCNC: 104 MMOL/L (ref 100–108)
CHOLEST SERPL-MCNC: 132 MG/DL (ref 50–200)
CO2 SERPL-SCNC: 30 MMOL/L (ref 21–32)
CREAT SERPL-MCNC: 1.18 MG/DL (ref 0.6–1.3)
EOSINOPHIL # BLD AUTO: 0.24 THOUSAND/ΜL (ref 0–0.61)
EOSINOPHIL NFR BLD AUTO: 3 % (ref 0–6)
ERYTHROCYTE [DISTWIDTH] IN BLOOD BY AUTOMATED COUNT: 12.3 % (ref 11.6–15.1)
GFR SERPL CREATININE-BSD FRML MDRD: 65 ML/MIN/1.73SQ M
GLUCOSE P FAST SERPL-MCNC: 82 MG/DL (ref 65–99)
HCT VFR BLD AUTO: 45.2 % (ref 36.5–49.3)
HDLC SERPL-MCNC: 55 MG/DL
HGB BLD-MCNC: 14.1 G/DL (ref 12–17)
IMM GRANULOCYTES # BLD AUTO: 0.02 THOUSAND/UL (ref 0–0.2)
IMM GRANULOCYTES NFR BLD AUTO: 0 % (ref 0–2)
LDLC SERPL CALC-MCNC: 64 MG/DL (ref 0–100)
LYMPHOCYTES # BLD AUTO: 2.13 THOUSANDS/ΜL (ref 0.6–4.47)
LYMPHOCYTES NFR BLD AUTO: 29 % (ref 14–44)
MCH RBC QN AUTO: 31.5 PG (ref 26.8–34.3)
MCHC RBC AUTO-ENTMCNC: 31.2 G/DL (ref 31.4–37.4)
MCV RBC AUTO: 101 FL (ref 82–98)
MONOCYTES # BLD AUTO: 0.79 THOUSAND/ΜL (ref 0.17–1.22)
MONOCYTES NFR BLD AUTO: 11 % (ref 4–12)
NEUTROPHILS # BLD AUTO: 4.08 THOUSANDS/ΜL (ref 1.85–7.62)
NEUTS SEG NFR BLD AUTO: 56 % (ref 43–75)
NONHDLC SERPL-MCNC: 77 MG/DL
NRBC BLD AUTO-RTO: 0 /100 WBCS
PLATELET # BLD AUTO: 218 THOUSANDS/UL (ref 149–390)
PMV BLD AUTO: 9.8 FL (ref 8.9–12.7)
POTASSIUM SERPL-SCNC: 4.4 MMOL/L (ref 3.5–5.3)
PROT SERPL-MCNC: 7.5 G/DL (ref 6.4–8.2)
RBC # BLD AUTO: 4.47 MILLION/UL (ref 3.88–5.62)
SODIUM SERPL-SCNC: 137 MMOL/L (ref 136–145)
TRIGL SERPL-MCNC: 67 MG/DL
TSH SERPL DL<=0.05 MIU/L-ACNC: 3.11 UIU/ML (ref 0.36–3.74)
WBC # BLD AUTO: 7.34 THOUSAND/UL (ref 4.31–10.16)

## 2020-05-18 PROCEDURE — 84443 ASSAY THYROID STIM HORMONE: CPT

## 2020-05-18 PROCEDURE — 80061 LIPID PANEL: CPT

## 2020-05-18 PROCEDURE — 82306 VITAMIN D 25 HYDROXY: CPT

## 2020-05-18 PROCEDURE — 85025 COMPLETE CBC W/AUTO DIFF WBC: CPT

## 2020-05-18 PROCEDURE — 87389 HIV-1 AG W/HIV-1&-2 AB AG IA: CPT

## 2020-05-18 PROCEDURE — 80053 COMPREHEN METABOLIC PANEL: CPT

## 2020-05-18 PROCEDURE — 36415 COLL VENOUS BLD VENIPUNCTURE: CPT

## 2020-05-19 LAB — HIV 1+2 AB+HIV1 P24 AG SERPL QL IA: NORMAL

## 2020-05-28 ENCOUNTER — OFFICE VISIT (OUTPATIENT)
Dept: INTERNAL MEDICINE CLINIC | Facility: CLINIC | Age: 65
End: 2020-05-28
Payer: COMMERCIAL

## 2020-05-28 VITALS
BODY MASS INDEX: 24.48 KG/M2 | TEMPERATURE: 97.4 F | DIASTOLIC BLOOD PRESSURE: 80 MMHG | SYSTOLIC BLOOD PRESSURE: 140 MMHG | HEIGHT: 62 IN | WEIGHT: 133 LBS | HEART RATE: 81 BPM | OXYGEN SATURATION: 90 %

## 2020-05-28 DIAGNOSIS — G47.9 SLEEP DISORDER: ICD-10-CM

## 2020-05-28 DIAGNOSIS — I50.22 CHRONIC SYSTOLIC CONGESTIVE HEART FAILURE (HCC): ICD-10-CM

## 2020-05-28 DIAGNOSIS — Z00.00 MEDICARE ANNUAL WELLNESS VISIT, INITIAL: Primary | ICD-10-CM

## 2020-05-28 DIAGNOSIS — D75.89 MACROCYTOSIS WITHOUT ANEMIA: ICD-10-CM

## 2020-05-28 PROCEDURE — 3008F BODY MASS INDEX DOCD: CPT | Performed by: INTERNAL MEDICINE

## 2020-05-28 PROCEDURE — 1036F TOBACCO NON-USER: CPT | Performed by: INTERNAL MEDICINE

## 2020-05-28 PROCEDURE — G0438 PPPS, INITIAL VISIT: HCPCS | Performed by: INTERNAL MEDICINE

## 2020-07-15 DIAGNOSIS — J44.9 CHRONIC OBSTRUCTIVE PULMONARY DISEASE, UNSPECIFIED COPD TYPE (HCC): ICD-10-CM

## 2020-07-16 RX ORDER — ALBUTEROL SULFATE 2.5 MG/3ML
2.5 SOLUTION RESPIRATORY (INHALATION) 4 TIMES DAILY
Qty: 375 ML | Refills: 5 | Status: SHIPPED | OUTPATIENT
Start: 2020-07-16 | End: 2021-02-08 | Stop reason: SDUPTHER

## 2020-07-26 DIAGNOSIS — E78.2 MIXED HYPERLIPIDEMIA: ICD-10-CM

## 2020-07-27 RX ORDER — ROSUVASTATIN CALCIUM 10 MG/1
TABLET, COATED ORAL
Qty: 90 TABLET | Refills: 1 | Status: SHIPPED | OUTPATIENT
Start: 2020-07-27 | End: 2021-01-22

## 2020-08-03 ENCOUNTER — TRANSCRIBE ORDERS (OUTPATIENT)
Dept: RADIOLOGY | Facility: HOSPITAL | Age: 65
End: 2020-08-03

## 2020-08-03 ENCOUNTER — HOSPITAL ENCOUNTER (OUTPATIENT)
Dept: RADIOLOGY | Facility: HOSPITAL | Age: 65
Discharge: HOME | End: 2020-08-03
Attending: INTERNAL MEDICINE
Payer: COMMERCIAL

## 2020-08-03 DIAGNOSIS — F17.201 TOBACCO USE DISORDER, MODERATE, IN SUSTAINED REMISSION, DEPENDENCE: ICD-10-CM

## 2020-08-03 PROCEDURE — G0297 LDCT FOR LUNG CA SCREEN: HCPCS

## 2020-08-10 DIAGNOSIS — J44.9 CHRONIC OBSTRUCTIVE PULMONARY DISEASE, UNSPECIFIED COPD TYPE (HCC): ICD-10-CM

## 2020-08-31 ENCOUNTER — OFFICE VISIT (OUTPATIENT)
Dept: PULMONOLOGY | Facility: CLINIC | Age: 65
End: 2020-08-31
Payer: COMMERCIAL

## 2020-08-31 VITALS
RESPIRATION RATE: 16 BRPM | WEIGHT: 130 LBS | OXYGEN SATURATION: 97 % | BODY MASS INDEX: 23.78 KG/M2 | DIASTOLIC BLOOD PRESSURE: 80 MMHG | SYSTOLIC BLOOD PRESSURE: 122 MMHG | TEMPERATURE: 96.7 F

## 2020-08-31 DIAGNOSIS — J44.9 CHRONIC OBSTRUCTIVE PULMONARY DISEASE, UNSPECIFIED COPD TYPE (HCC): Primary | ICD-10-CM

## 2020-08-31 DIAGNOSIS — G47.33 OBSTRUCTIVE SLEEP APNEA: ICD-10-CM

## 2020-08-31 DIAGNOSIS — J44.9 STAGE 3 SEVERE COPD BY GOLD CLASSIFICATION (HCC): ICD-10-CM

## 2020-08-31 DIAGNOSIS — G47.9 SLEEP DISORDER: ICD-10-CM

## 2020-08-31 PROCEDURE — G0008 ADMIN INFLUENZA VIRUS VAC: HCPCS

## 2020-08-31 PROCEDURE — 90662 IIV NO PRSV INCREASED AG IM: CPT

## 2020-08-31 PROCEDURE — 99214 OFFICE O/P EST MOD 30 MIN: CPT | Performed by: INTERNAL MEDICINE

## 2020-08-31 PROCEDURE — 1036F TOBACCO NON-USER: CPT | Performed by: INTERNAL MEDICINE

## 2020-08-31 NOTE — LETTER
August 31, 2020     Dena Wu DO  [address]  [address], [address] [facility],6Th [address]    Patient: Adelaide Younger  YOB: 1955   Date of Visit: 8/31/2020       Dear Dr Ken Bahena: Thank you for referring Claire Cervantes to me for evaluation  Below are my notes for this consultation  If you have questions, please do not hesitate to call me  I look forward to following your patient along with you  Sincerely,        Mariela Martin MD        CC: No Recipients  Mariela Martin MD  8/31/2020  9:42 AM  Signed  Pulmonary Follow Up Note   Adelaide Younger  63 y o  male MRN: [id number]  8/31/2020      Assessment:    Obstructive sleep apnea  Using the CPAP pressure if tolerated, we think it helps is not sure of the problems with his daytime sleepiness, but overall believes it is making his breathing better    Sleep disorder  A detailed above with obstructive sleep apnea      Plan:    Diagnoses and all orders for this visit:    Chronic obstructive pulmonary disease, unspecified COPD type ([facility] )  -     Complete PFT without post bronchodilator; Future  -     Six minute walk; Future  -     Flu Vaccine High Dose Split Preservative Free IM    Stage 3 severe COPD by GOLD classification ([facility] )    Obstructive sleep apnea    Sleep disorder    Will order new full pulmonary function test, 6 minutes walk test  Patient will have high-dose flu shot today in the clinic    Return in about 6 months (around 2/28/2021)      History of Present Illness   HPI:  Adelaide Younger  is a 63 y o  male who has past medical history of COPD, obstructive sleep apnea, hypertension, has been following up with Dr Luque, he also is on long-term oxygen therapy (on ambulation, and with sleep) he presents today for follow-up and no change in his respiratory symptoms, chronic dyspnea on exertion, cough, wheeze, and chest tightness, he has been adherent to the inhalers, he has been using his DuoNeb nebulizer every 6 hours, he remains on Trelegy, and he is on chronic steroid therapy he takes prednisone 10 mg daily comment medially and using his CPAP every night   Review of Systems    Historical Information   Past Medical History:   Diagnosis Date    Bladder mass     Cardiomyopathy [facility])     Chest pain     COPD (chronic obstructive pulmonary disease) (HCC)     Emphysema of lung (HCC)     Hypoxia     nocturnal    Left bundle branch block     Multiple pulmonary nodules     last assessed: 10/12/16    Smoker      Past Surgical History:   Procedure Laterality Date    CARDIAC CATHETERIZATION      CYSTOSCOPY      MA CYSTOURETHROSCOPY,FULGUR <0 5 CM LESN N/A 1/3/2020    Procedure: TRANSURETHRAL RESECTION OF BLADDER TUMOR (TURBT);   Surgeon: Nadege Mercdao MD;  [facility];  Service: Urology    MA INSTILL ANTICANCER AGENT IN BLADDER N/A 1/3/2020    Procedure: INSTILLATION MITOMYCIN;  Surgeon: Nadege Mercado MD;  [facility];  Service: Urology     Family History   Problem Relation Age of Onset    Diabetes Mother          Meds/Allergies     Current Outpatient Medications:     albuterol (2 5 mg/3 mL) 0 083 % nebulizer solution, TAKE 1 VIAL (2 5 MG TOTAL) BY NEBULIZATION 4 (FOUR) TIMES A DAY, Disp: 375 mL, Rfl: 5    albuterol (PROVENTIL HFA,VENTOLIN HFA) 90 mcg/act inhaler, TAKE 2 PUFFS BY MOUTH EVERY 6 HOURS AS NEEDED FOR WHEEZE, Disp: 6 7 Inhaler, Rfl: 6    albuterol (VENTOLIN HFA) 90 mcg/act inhaler, Inhale 2 puffs every 6 (six) hours as needed for wheezing, Disp: 1 Inhaler, Rfl: 0    alendronate (FOSAMAX) 70 mg tablet, TAKE 1 TABLET BY MOUTH ONE TIME PER WEEK, Disp: 12 tablet, Rfl: 1    aspirin 81 MG tablet, Take 1 tablet by mouth daily, Disp: , Rfl:     ergocalciferol (VITAMIN D2) 50,000 units, TAKE 1 CAPSULE (50,000 UNITS TOTAL) BY MOUTH EVERY 28 DAYS, Disp: 3 capsule, Rfl: 1    fluticasone-umeclidinium-vilanterol (TRELEGY ELLIPTA) 100-62 5-25 MCG/INH inhaler, Inhale 1 puff daily Rinse mouth after use , Disp: 1 Inhaler, Rfl: 11    ipratropium (ATROVENT) 0 02 % nebulizer solution, Take 1 vial (0 5 mg total) by nebulization 4 (four) times a day, Disp: 120 vial, Rfl: 6    pantoprazole (PROTONIX) 40 mg tablet, Take by mouth Daily, Disp: , Rfl:     predniSONE 10 mg tablet, Take 2 tablets by mouth daily, Disp: , Rfl:     rosuvastatin (CRESTOR) 10 MG tablet, TAKE 1 TABLET BY MOUTH EVERY DAY, Disp: 90 tablet, Rfl: 1  No Known Allergies    Vitals: Blood pressure 122/80, temperature (!) 96 7 °F (35 9 °C), resp  rate 16, weight 59 kg (130 lb), SpO2 97 %  Body mass index is 23 78 kg/m²  Oxygen Therapy  SpO2: 97 %  Oxygen Therapy: Supplemental oxygen  O2 Delivery Method: Nasal cannula  O2 Flow Rate (L/min): 3 L/min      Physical Exam  Physical Exam    Labs: I have personally reviewed pertinent lab results  , ABG: No results found for: PHART, ESJ5TPR, PO2ART, BPC3JLS, N1ENQXEY, BEART, SOURCE, BNP: No results found for: BNP, CBC: No results found for: WBC, HGB, HCT, MCV, PLT, ADJUSTEDWBC, MCH, MCHC, RDW, MPV, NRBC, CMP: No results found for: SODIUM, K, CL, CO2, ANIONGAP, BUN, CREATININE, GLUCOSE, CALCIUM, AST, ALT, ALKPHOS, PROT, BILITOT, EGFR, PT/INR: No results found for: PT, INR  Lab Results   Component Value Date    WBC 7 34 05/18/2020    HGB 14 1 05/18/2020    HCT 45 2 05/18/2020     (H) 05/18/2020     05/18/2020     Lab Results   Component Value Date    GLUCOSE 90 08/17/2015    CALCIUM 9 3 05/18/2020     08/17/2015    K 4 4 05/18/2020    CO2 30 05/18/2020     05/18/2020    BUN 19 05/18/2020    CREATININE 1 18 05/18/2020     No results found for: IGE  Lab Results   Component Value Date    ALT 24 05/18/2020    AST 18 05/18/2020    ALKPHOS 64 05/18/2020    BILITOT 0 60 08/17/2015         Imaging and other studies: I have personally reviewed pertinent films in PACS  CT 8/3/2020  LUNGS:  There has been interval resolution of the nodular densities in the left upper and left lower lobes seen on the most recent study  3 mm right upper lobe nodule medially on image 3/35, and 5 mm right lower lobe nodule laterally on image 3/68   remains stable since 3/8/2017, therefore benign  No new nodules or consolidation  Moderate emphysematous changes again identified  There is no tracheal or endobronchial lesion  Pulmonary function testing:           Renuka Bella MD  [facility] [place][note 2] [facility]       Portions of the record may have been created with voice recognition software  Occasional wrong word or "sound a like" substitutions may have occurred due to the inherent limitations of voice recognition software  Read the chart carefully and recognize, using context, where substitutions have occurred

## 2020-08-31 NOTE — PROGRESS NOTES
Pulmonary Follow Up Note   Carlus Dancer  63 y o  male MRN: [id number]  8/31/2020      Assessment:    Obstructive sleep apnea  Using the CPAP pressure if tolerated, we think it helps is not sure of the problems with his daytime sleepiness, but overall believes it is making his breathing better    Sleep disorder  A detailed above with obstructive sleep apnea      Plan:    Diagnoses and all orders for this visit:    Chronic obstructive pulmonary disease, unspecified COPD type ([place] [address] )  -     Complete PFT without post bronchodilator; Future  -     Six minute walk; Future  -     Flu Vaccine High Dose Split Preservative Free IM    Stage 3 severe COPD by GOLD classification ([place] [address] )    Obstructive sleep apnea    Sleep disorder    Will order new full pulmonary function test, 6 minutes walk test  Patient will have high-dose flu shot today in the clinic    Return in about 6 months (around 2/28/2021)  History of Present Illness   HPI:  Carlus Dancer  is a 63 y o  male who has past medical history of COPD, obstructive sleep apnea, hypertension, has been following up with Dr Luque, he also is on long-term oxygen therapy (on ambulation, and with sleep) he presents today for follow-up and no change in his respiratory symptoms, chronic dyspnea on exertion, cough, wheeze, and chest tightness, he has been adherent to the inhalers, he has been using his DuoNeb nebulizer every 6 hours, he remains on Trelegy, and he is on chronic steroid therapy he takes prednisone 10 mg daily comment medially and using his CPAP every night   Review of Systems   Constitutional: Negative for chills, diaphoresis, fatigue and fever  HENT: Negative for congestion, postnasal drip, rhinorrhea, sinus pressure and sore throat  Eyes: Negative for redness and visual disturbance  Respiratory: Positive for cough, chest tightness and shortness of breath  Negative for wheezing and stridor  Cardiovascular: Negative for chest pain and leg swelling  Gastrointestinal: Negative for abdominal distention, abdominal pain, diarrhea and vomiting  Endocrine: Negative for polydipsia and polyphagia  Genitourinary: Negative for dysuria and hematuria  Musculoskeletal: Negative for back pain and myalgias  Skin: Negative for pallor and rash  Neurological: Negative for dizziness, weakness and headaches  Psychiatric/Behavioral: Negative for sleep disturbance  The patient is not nervous/anxious  Historical Information   Past Medical History:   Diagnosis Date    Bladder mass     Cardiomyopathy [facility])     Chest pain     COPD (chronic obstructive pulmonary disease) (HCC)     Emphysema of lung (HCC)     Hypoxia     nocturnal    Left bundle branch block     Multiple pulmonary nodules     last assessed: 10/12/16    Smoker      Past Surgical History:   Procedure Laterality Date    CARDIAC CATHETERIZATION      CYSTOSCOPY      NV CYSTOURETHROSCOPY,FULGUR <0 5 CM LESN N/A 1/3/2020    Procedure: TRANSURETHRAL RESECTION OF BLADDER TUMOR (TURBT);   Surgeon: Ion Novak MD;  [facility];  Service: Urology    NV INSTILL ANTICANCER AGENT IN BLADDER N/A 1/3/2020    Procedure: INSTILLATION MITOMYCIN;  Surgeon: Ion Novak MD;  [facility];  Service: Urology     Family History   Problem Relation Age of Onset    Diabetes Mother          Meds/Allergies     Current Outpatient Medications:     albuterol (2 5 mg/3 mL) 0 083 % nebulizer solution, TAKE 1 VIAL (2 5 MG TOTAL) BY NEBULIZATION 4 (FOUR) TIMES A DAY, Disp: 375 mL, Rfl: 5    albuterol (PROVENTIL HFA,VENTOLIN HFA) 90 mcg/act inhaler, TAKE 2 PUFFS BY MOUTH EVERY 6 HOURS AS NEEDED FOR WHEEZE, Disp: 6 7 Inhaler, Rfl: 6    albuterol (VENTOLIN HFA) 90 mcg/act inhaler, Inhale 2 puffs every 6 (six) hours as needed for wheezing, Disp: 1 Inhaler, Rfl: 0    alendronate (FOSAMAX) 70 mg tablet, TAKE 1 TABLET BY MOUTH ONE TIME PER WEEK, Disp: 12 tablet, Rfl: 1    aspirin 81 MG tablet, Take 1 tablet by mouth daily, Disp: , Rfl:     ergocalciferol (VITAMIN D2) 50,000 units, TAKE 1 CAPSULE (50,000 UNITS TOTAL) BY MOUTH EVERY 28 DAYS, Disp: 3 capsule, Rfl: 1    fluticasone-umeclidinium-vilanterol (TRELEGY ELLIPTA) 100-62 5-25 MCG/INH inhaler, Inhale 1 puff daily Rinse mouth after use , Disp: 1 Inhaler, Rfl: 11    ipratropium (ATROVENT) 0 02 % nebulizer solution, Take 1 vial (0 5 mg total) by nebulization 4 (four) times a day, Disp: 120 vial, Rfl: 6    pantoprazole (PROTONIX) 40 mg tablet, Take by mouth Daily, Disp: , Rfl:     predniSONE 10 mg tablet, Take 2 tablets by mouth daily, Disp: , Rfl:     rosuvastatin (CRESTOR) 10 MG tablet, TAKE 1 TABLET BY MOUTH EVERY DAY, Disp: 90 tablet, Rfl: 1  No Known Allergies    Vitals: Blood pressure 122/80, temperature (!) 96 7 °F (35 9 °C), resp  rate 16, weight 59 kg (130 lb), SpO2 97 %  Body mass index is 23 78 kg/m²  Oxygen Therapy  SpO2: 97 %  Oxygen Therapy: Supplemental oxygen  O2 Delivery Method: Nasal cannula  O2 Flow Rate (L/min): 3 L/min      Physical Exam  Physical Exam  Constitutional:       Appearance: Normal appearance  HENT:      Head: Normocephalic and atraumatic  Nose: No congestion or rhinorrhea  Mouth/Throat:      Mouth: Mucous membranes are moist       Pharynx: Oropharynx is clear  Eyes:      General: No scleral icterus  Right eye: No discharge  Left eye: No discharge  Pupils: Pupils are equal, round, and reactive to light  Neck:      Musculoskeletal: No neck rigidity  Cardiovascular:      Rate and Rhythm: Normal rate and regular rhythm  Pulses: Normal pulses  Heart sounds: Normal heart sounds  No murmur  No gallop  Pulmonary:      Effort: Pulmonary effort is normal  No respiratory distress  Breath sounds: No wheezing, rhonchi or rales  Comments: Bilateral diminished, equal air entry no wheeze no crackles  Abdominal:      General: Abdomen is flat   Bowel sounds are normal  There is no distension  Palpations: Abdomen is soft  Tenderness: There is no abdominal tenderness  Musculoskeletal:         General: No swelling, tenderness or deformity  Skin:     General: Skin is warm and dry  Neurological:      General: No focal deficit present  Mental Status: He is alert and oriented to person, place, and time  Mental status is at baseline  Psychiatric:         Mood and Affect: Mood normal          Behavior: Behavior normal          Thought Content: Thought content normal          Judgment: Judgment normal          Labs: I have personally reviewed pertinent lab results  , ABG: No results found for: PHART, [id number], PO2ART, VCH2KQV, E0CTFSQB, BEART, SOURCE, BNP: No results found for: BNP, CBC: No results found for: WBC, HGB, HCT, MCV, PLT, ADJUSTEDWBC, MCH, MCHC, RDW, MPV, NRBC, CMP: No results found for: SODIUM, K, CL, CO2, ANIONGAP, BUN, CREATININE, GLUCOSE, CALCIUM, AST, ALT, ALKPHOS, PROT, BILITOT, EGFR, PT/INR: No results found for: PT, INR  Lab Results   Component Value Date    WBC 7 34 05/18/2020    HGB 14 1 05/18/2020    HCT 45 2 05/18/2020     (H) 05/18/2020     05/18/2020     Lab Results   Component Value Date    GLUCOSE 90 08/17/2015    CALCIUM 9 3 05/18/2020     08/17/2015    K 4 4 05/18/2020    CO2 30 05/18/2020     05/18/2020    BUN 19 05/18/2020    CREATININE 1 18 05/18/2020     No results found for: IGE  Lab Results   Component Value Date    ALT 24 05/18/2020    AST 18 05/18/2020    ALKPHOS 64 05/18/2020    BILITOT 0 60 08/17/2015         Imaging and other studies: I have personally reviewed pertinent films in PACS  CT 8/3/2020  LUNGS:  There has been interval resolution of the nodular densities in the left upper and left lower lobes seen on the most recent study  3 mm right upper lobe nodule medially on image 3/35, and 5 mm right lower lobe nodule laterally on image 3/68   remains stable since 3/8/2017, therefore benign    No new nodules or consolidation  Moderate emphysematous changes again identified  There is no tracheal or endobronchial lesion  Pulmonary function testing:           Heather Carr MD  [address] [facility]       Portions of the record may have been created with voice recognition software  Occasional wrong word or "sound a like" substitutions may have occurred due to the inherent limitations of voice recognition software  Read the chart carefully and recognize, using context, where substitutions have occurred

## 2020-08-31 NOTE — PATIENT INSTRUCTIONS
Patient education: Chronic obstructive pulmonary disease (COPD) (Beyond the Basics)   Author:  Nikki Valdez MD, MS  Section :  Tahir Walker MD, MS  [place] :  Santana Zarate MD  Contributor Disclosures  All topics are updated as new evidence becomes available and our peer review process is complete  Literature review current through: Jul 2020  This topic last updated: Oct 03, 2019  COPD OVERVIEW  Chronic obstructive pulmonary disease (often called "COPD") is a condition in which the airways in the lungs become inflamed and narrowed and the air sacs become damaged  Smoking cigarettes is the most common cause of COPD  As the lungs become more damaged over time, it becomes increasingly difficult to breathe  When the damage is extensive, it may also become harder for the lungs to get enough oxygen into the blood and to get rid of excess carbon dioxide  These changes all lead to shortness of breath and other symptoms  The term COPD also includes chronic bronchitis (inflammation of the bronchial tubes, which causes a persistent cough) and emphysema (damage of the air sacs)  This article discusses the causes, symptoms, and diagnosis of COPD  The treatment of COPD is discussed separately  (See "Patient education: Chronic obstructive pulmonary disease (COPD) treatments (Beyond the Basics)" )  COPD CAUSES  To understand how COPD develops, it is important to understand how the lungs work  Normally, air that we breathe passes from the nose and mouth through the airways to the tiny air sacs of the lung, called "alveoli " In the air sacs, oxygen passes through the walls of air sacs into the bloodstream (figure 1)  Carbon dioxide passes in the reverse direction, out of the bloodstream and back into the alveoli, and is then eliminated by breathing out  COPD is caused by inhaling irritating gases and particles over many years   The most common cause is tobacco smoking, although passive exposure to smoke (ie, secondhand smoke) or breathing in other fumes (eg, indoor unvented fires, air pollution) can also contribute  Genetic susceptibility also likely plays a role, meaning that some people are more likely to suffer lung damage when exposed to irritants such as cigarette smoke  Over time, inflammation in the airways becomes chronic, and scarring of the airways and lung tissue develops (figure 2)  This lung damage makes it more difficult to breathe in and out and makes it harder for oxygen and carbon dioxide to pass across the walls of the air sacs  Reasons for airflow blockage -- Any disease that interferes with airflow out of the lungs can lead to COPD  Most people with COPD have chronic bronchitis and emphysema, and some also have asthma  Chronic bronchitis -- Chronic bronchitis is the term used to describe people who have a chronic (long-term) cough that produces sputum, which is a result of bronchial inflammation  This condition is frequently seen in people who smoke cigarettes  Chronic bronchitis can scar the airways and reduce airflow  Emphysema -- Emphysema is the term used to describe damage to the air sacs in the lung  This damage can also restrict airflow  Asthma -- Asthma is also a chronic inflammatory disorder of the airways  The triggers for this inflammation include exposure to inhaled allergens, respiratory irritants, and viral infections  The inflammation leads to intermittent episodes of wheezing, breathlessness, chest tightness, and coughing, particularly at night or in the early morning  (See "Patient education: Asthma treatment in adolescents and adults (Beyond the Basics)"  )  For people with asthma, treatment is usually successful in reversing inflammation and airway narrowing  In a minority of people with asthma, the chronic inflammation permanently restricts airflow  When this airway narrowing cannot be completely reversed with treatment, the person is said to have COPD    COPD RISK FACTORS  Smoking cigarettes significantly increases the risk of developing COPD  The majority ([de-identified] percent) of people who develop COPD have a history of smoking  Other risk factors for COPD include an abnormal sensitivity and exaggerated response to inhaled substances (called "airway responsiveness") and environmental exposure (eg, to secondhand smoke, dust or organic materials in the workplace, or air pollution)  COPD can run in families  Genetic risk factors for COPD include severe deficiency of alpha-1 antitrypsin, a protein that protects the lungs  (See 'Testing for alpha-1 antitrypsin deficiency' below )  COPD SYMPTOMS  At first, COPD usually causes no symptoms or only mild symptoms  As the disease progresses, symptoms usually worsen  The most common symptoms include:  ? Coughing and spitting up phlegm (mucus)  ? Wheezing (a whistling or squeaking noise as you breathe)  ? Shortness of breath, at first with activity and eventually (as the disease progresses) at rest  ?Fatigue  Although symptoms get worse gradually as COPD progresses, some people also experience "exacerbations " This is when symptoms flare up more than usual, and may require additional treatment for a few days or weeks  (See "Patient education: Chronic obstructive pulmonary disease (COPD) treatments (Beyond the Basics)" )  COPD DIAGNOSIS  If your healthcare provider suspects that you might have COPD based on your symptoms and history, he or she can order breathing tests, called pulmonary function tests  Pulmonary function tests (PFTs) -- The test used to diagnose COPD is a type of a pulmonary (lung) function test called spirometry  It assesses how well your lungs are working  During spirometry, you take a deep breath in and then blow out as hard and as fast as you can into a tube connected to a machine called a spirometer  The spirometer measures how fast and how much air you can blow out of your lungs   People with COPD blow air out more slowly because of inflammation and narrowing of the airways  If the measurement is abnormal, the next step is to repeat the test after you use a bronchodilator  This is a medication you breathe in through an inhaler; it helps to widen the airways  In people with asthma, the test measurements usually return to normal after using a bronchodilator, but in people with COPD, the test measurements may only partially improve  In some cases, your doctor might have you repeat the test at another time to confirm the diagnosis  Then, over time, spirometry is repeated regularly to monitor your COPD and evaluate how well treatments are working  In some cases, a health care provider will recommend additional pulmonary function tests  This might be done to check for other lung diseases that may be causing your symptoms, or to guide treatment of COPD in specific cases  Additional tests may include:  ?Measurements of lung volume - This usually involves sitting in a special adam and breathing through a mouthpiece  The test measures how much air your lungs can hold  People with more advanced COPD may have an increase in lung volume  This is because it is hard to exhale all the air when the airways are narrowed  Also, COPD can cause a type of lung scarring that traps excess air in the lungs  ? Diffusing capacity test - This test uses a machine to measure the ability of the lungs to transfer gases like oxygen  This machine usually uses a very small amount of carbon monoxide, which is not enough to cause harm, but which reflects how oxygen is absorbed  In people with emphysema, the ability to transfer gases is typically reduced  ?Pulse oximetry - This test measures the amount of oxygen in the blood using a device called a finger oximeter, which clips onto the finger  Oximetry is typically measured at rest, and can also be checked during exercise, such as while you are walking on level ground or climbing stairs   When the oxygen saturation is 88 percent or below at rest, supplemental oxygen is usually prescribed  ? Arterial blood gas - An arterial blood gas sample may be obtained to determine if you have problems clearing carbon dioxide from the blood  Arterial blood gas is usually obtained by taking a blood sample from the artery in your wrist   ? Exercise tests - There are several types of exercise tests  The simplest is to determine how far you can walk in 6 minutes  In this test, your pulse, difficulty breathing, and blood oximetry are usually measured in addition to the distance walked  Other tests involve monitoring your lung and heart function during more strenuous exercise on a bicycle or treadmill, but these are done less commonly  Testing for alpha-1 antitrypsin deficiency -- Alpha-1 antitrypsin deficiency is a genetic disorder which causes 2 to 3 percent of the cases of emphysema in the [place]  All adults who have symptoms of COPD should get blood tests to check for alpha-1 antitrypsin deficiency  Imaging tests -- In certain cases, a health care provider might order an imaging test such as a chest X-ray or computed tomography (CT) scan  In general, a chest X-ray is not needed to diagnose COPD  However, a chest X-ray may be done to exclude other lung diseases  A CT scan can detect the presence of emphysema  In people with advanced disease, this test may be done to help guide treatment  A special type of CT scan can be used to screen for lung cancer in people who are at increased risk  COPD TREATMENT  The first and most important part of any treatment plan for COPD is to stop smoking, if you smoke  This is true regardless of how advanced your disease is  Stopping smoking can help slow progression of COPD, no matter how long you have had it  Quitting smoking can be challenging, but your health care provider can help  (See "Patient education: Quitting smoking (Beyond the Basics)"  )  The treatment of COPD is discussed in detail separately  (See "Patient education: Chronic obstructive pulmonary disease (COPD) treatments (Beyond the Basics)"  )  WHERE TO GET MORE INFORMATION  Your healthcare provider is the best source of information for questions and concerns related to your medical problem  This article will be updated as needed on our web site ([email or web address])  Related topics for patients, as well as selected articles written for healthcare professionals, are also available  Some of the most relevant are listed below  Patient level information -- [employer] offers two types of patient education materials  The Basics -- The Basics patient education pieces answer the four or five key questions a patient might have about a given condition  These articles are best for patients who want a general overview and who prefer short, easy-to-read materials    Patient education: Chronic obstructive pulmonary disease (COPD) (The Basics)  Patient education: Shortness of breath (dyspnea) (The Basics)  Patient education: Pneumothorax (collapsed lung) (The Basics)  Patient education: Cough in adults (The Basics)  Patient education: Chronic bronchitis (The Basics)  Patient education: Atelectasis (The Basics)  Patient education: Chronic pulmonary aspergillosis (The Basics)  Patient education: Breathing tests (The Basics)  Patient education: Medicines for chronic obstructive pulmonary disease (COPD) (The Basics)  Patient education: Lung transplant (The Basics)

## 2020-08-31 NOTE — ASSESSMENT & PLAN NOTE
Using the CPAP pressure if tolerated, we think it helps is not sure of the problems with his daytime sleepiness, but overall believes it is making his breathing better

## 2020-09-16 ENCOUNTER — HOSPITAL ENCOUNTER (OUTPATIENT)
Dept: PULMONOLOGY | Facility: HOSPITAL | Age: 65
Discharge: HOME | End: 2020-09-16
Attending: INTERNAL MEDICINE
Payer: COMMERCIAL

## 2020-09-16 DIAGNOSIS — J44.9 CHRONIC OBSTRUCTIVE PULMONARY DISEASE, UNSPECIFIED COPD TYPE (HCC): ICD-10-CM

## 2020-09-16 PROCEDURE — 94060 EVALUATION OF WHEEZING: CPT

## 2020-09-16 PROCEDURE — 94060 EVALUATION OF WHEEZING: CPT | Performed by: INTERNAL MEDICINE

## 2020-09-16 PROCEDURE — 94761 N-INVAS EAR/PLS OXIMETRY MLT: CPT

## 2020-09-16 PROCEDURE — 94729 DIFFUSING CAPACITY: CPT

## 2020-09-16 PROCEDURE — 94726 PLETHYSMOGRAPHY LUNG VOLUMES: CPT | Performed by: INTERNAL MEDICINE

## 2020-09-16 PROCEDURE — 94726 PLETHYSMOGRAPHY LUNG VOLUMES: CPT

## 2020-09-16 PROCEDURE — 94729 DIFFUSING CAPACITY: CPT | Performed by: INTERNAL MEDICINE

## 2020-09-16 PROCEDURE — 94618 PULMONARY STRESS TESTING: CPT | Performed by: INTERNAL MEDICINE

## 2020-09-30 DIAGNOSIS — M81.6 LOCALIZED OSTEOPOROSIS WITHOUT CURRENT PATHOLOGICAL FRACTURE: ICD-10-CM

## 2020-09-30 DIAGNOSIS — E55.9 VITAMIN D DEFICIENCY: ICD-10-CM

## 2020-09-30 RX ORDER — ERGOCALCIFEROL 1.25 MG/1
50000 CAPSULE ORAL
Qty: 3 CAPSULE | Refills: 1 | Status: SHIPPED | OUTPATIENT
Start: 2020-09-30 | End: 2021-03-15

## 2020-11-11 ENCOUNTER — OFFICE VISIT (OUTPATIENT)
Dept: CARDIOLOGY CLINIC | Facility: CLINIC | Age: 65
End: 2020-11-11
Payer: COMMERCIAL

## 2020-11-11 VITALS
HEIGHT: 62 IN | DIASTOLIC BLOOD PRESSURE: 64 MMHG | SYSTOLIC BLOOD PRESSURE: 112 MMHG | TEMPERATURE: 98.4 F | BODY MASS INDEX: 23.61 KG/M2 | HEART RATE: 96 BPM | WEIGHT: 128.3 LBS

## 2020-11-11 DIAGNOSIS — I44.7 LEFT BUNDLE-BRANCH BLOCK: ICD-10-CM

## 2020-11-11 DIAGNOSIS — E78.00 HYPERCHOLESTEROLEMIA: ICD-10-CM

## 2020-11-11 DIAGNOSIS — I42.0 CARDIOMYOPATHY, DILATED (HCC): Primary | ICD-10-CM

## 2020-11-11 DIAGNOSIS — G47.33 OBSTRUCTIVE SLEEP APNEA: ICD-10-CM

## 2020-11-11 DIAGNOSIS — I50.22 CHRONIC SYSTOLIC CONGESTIVE HEART FAILURE (HCC): ICD-10-CM

## 2020-11-11 DIAGNOSIS — I25.10 CORONARY ARTERY DISEASE INVOLVING NATIVE CORONARY ARTERY OF NATIVE HEART WITHOUT ANGINA PECTORIS: ICD-10-CM

## 2020-11-11 PROCEDURE — 1036F TOBACCO NON-USER: CPT | Performed by: INTERNAL MEDICINE

## 2020-11-11 PROCEDURE — 99214 OFFICE O/P EST MOD 30 MIN: CPT | Performed by: INTERNAL MEDICINE

## 2020-11-11 PROCEDURE — 93000 ELECTROCARDIOGRAM COMPLETE: CPT | Performed by: INTERNAL MEDICINE

## 2020-11-12 ENCOUNTER — TELEPHONE (OUTPATIENT)
Dept: PULMONOLOGY | Facility: CLINIC | Age: 65
End: 2020-11-12

## 2020-11-12 DIAGNOSIS — J44.9 CHRONIC OBSTRUCTIVE PULMONARY DISEASE, UNSPECIFIED COPD TYPE (HCC): Primary | ICD-10-CM

## 2020-11-12 RX ORDER — PREDNISONE 10 MG/1
10 TABLET ORAL DAILY
Qty: 5 TABLET | Refills: 0 | Status: SHIPPED | OUTPATIENT
Start: 2020-11-12 | End: 2021-02-25 | Stop reason: SDUPTHER

## 2020-11-12 RX ORDER — HYDROCODONE POLISTIREX AND CHLORPHENIRAMINE POLISTIREX 10; 8 MG/5ML; MG/5ML
5 SUSPENSION, EXTENDED RELEASE ORAL EVERY 12 HOURS PRN
Qty: 120 ML | Refills: 0 | Status: SHIPPED | OUTPATIENT
Start: 2020-11-12

## 2020-11-13 ENCOUNTER — TRANSCRIBE ORDERS (OUTPATIENT)
Dept: RADIOLOGY | Facility: HOSPITAL | Age: 65
End: 2020-11-13

## 2020-11-13 ENCOUNTER — HOSPITAL ENCOUNTER (OUTPATIENT)
Dept: RADIOLOGY | Facility: HOSPITAL | Age: 65
Discharge: HOME | End: 2020-11-13
Payer: COMMERCIAL

## 2020-11-13 DIAGNOSIS — J44.9 CHRONIC OBSTRUCTIVE PULMONARY DISEASE, UNSPECIFIED COPD TYPE (HCC): ICD-10-CM

## 2020-11-13 PROCEDURE — 71046 X-RAY EXAM CHEST 2 VIEWS: CPT

## 2020-11-15 ENCOUNTER — TELEPHONE (OUTPATIENT)
Dept: PULMONOLOGY | Facility: HOSPITAL | Age: 65
End: 2020-11-15

## 2020-11-19 ENCOUNTER — OFFICE VISIT (OUTPATIENT)
Dept: PULMONOLOGY | Facility: HOSPITAL | Age: 65
End: 2020-11-19
Payer: MEDICARE

## 2020-11-19 VITALS
OXYGEN SATURATION: 82 % | HEIGHT: 62 IN | WEIGHT: 128 LBS | SYSTOLIC BLOOD PRESSURE: 100 MMHG | HEART RATE: 94 BPM | TEMPERATURE: 97.8 F | DIASTOLIC BLOOD PRESSURE: 70 MMHG | BODY MASS INDEX: 23.55 KG/M2

## 2020-11-19 DIAGNOSIS — G47.33 OBSTRUCTIVE SLEEP APNEA: ICD-10-CM

## 2020-11-19 DIAGNOSIS — J44.1 CHRONIC OBSTRUCTIVE PULMONARY DISEASE WITH ACUTE EXACERBATION (HCC): Primary | ICD-10-CM

## 2020-11-19 DIAGNOSIS — J96.11 CHRONIC HYPOXEMIC RESPIRATORY FAILURE (HCC): ICD-10-CM

## 2020-11-19 PROCEDURE — 99214 OFFICE O/P EST MOD 30 MIN: CPT | Performed by: INTERNAL MEDICINE

## 2020-11-19 RX ORDER — AZITHROMYCIN 250 MG/1
TABLET, FILM COATED ORAL
Qty: 6 TABLET | Refills: 0 | Status: SHIPPED | OUTPATIENT
Start: 2020-11-19 | End: 2020-11-23

## 2020-11-19 RX ORDER — PREDNISONE 20 MG/1
40 TABLET ORAL DAILY
Qty: 10 TABLET | Refills: 0 | Status: SHIPPED | OUTPATIENT
Start: 2020-11-19 | End: 2021-04-09

## 2020-11-20 ENCOUNTER — LAB (OUTPATIENT)
Dept: LAB | Age: 65
End: 2020-11-20
Payer: COMMERCIAL

## 2020-11-20 DIAGNOSIS — D75.89 MACROCYTOSIS WITHOUT ANEMIA: ICD-10-CM

## 2020-11-20 DIAGNOSIS — I50.22 CHRONIC SYSTOLIC CONGESTIVE HEART FAILURE (HCC): ICD-10-CM

## 2020-11-20 LAB
ALBUMIN SERPL BCP-MCNC: 4 G/DL (ref 3.5–5)
ALP SERPL-CCNC: 74 U/L (ref 46–116)
ALT SERPL W P-5'-P-CCNC: 27 U/L (ref 12–78)
ANION GAP SERPL CALCULATED.3IONS-SCNC: 1 MMOL/L (ref 4–13)
AST SERPL W P-5'-P-CCNC: 19 U/L (ref 5–45)
BILIRUB SERPL-MCNC: 0.55 MG/DL (ref 0.2–1)
BUN SERPL-MCNC: 18 MG/DL (ref 5–25)
CALCIUM SERPL-MCNC: 9.3 MG/DL (ref 8.3–10.1)
CHLORIDE SERPL-SCNC: 106 MMOL/L (ref 100–108)
CO2 SERPL-SCNC: 33 MMOL/L (ref 21–32)
CREAT SERPL-MCNC: 1.03 MG/DL (ref 0.6–1.3)
ERYTHROCYTE [DISTWIDTH] IN BLOOD BY AUTOMATED COUNT: 12.4 % (ref 11.6–15.1)
GFR SERPL CREATININE-BSD FRML MDRD: 77 ML/MIN/1.73SQ M
GLUCOSE P FAST SERPL-MCNC: 95 MG/DL (ref 65–99)
HCT VFR BLD AUTO: 45.8 % (ref 36.5–49.3)
HGB BLD-MCNC: 14.3 G/DL (ref 12–17)
MCH RBC QN AUTO: 31.8 PG (ref 26.8–34.3)
MCHC RBC AUTO-ENTMCNC: 31.2 G/DL (ref 31.4–37.4)
MCV RBC AUTO: 102 FL (ref 82–98)
PLATELET # BLD AUTO: 246 THOUSANDS/UL (ref 149–390)
PMV BLD AUTO: 9.7 FL (ref 8.9–12.7)
POTASSIUM SERPL-SCNC: 4.6 MMOL/L (ref 3.5–5.3)
PROT SERPL-MCNC: 7.2 G/DL (ref 6.4–8.2)
RBC # BLD AUTO: 4.49 MILLION/UL (ref 3.88–5.62)
SODIUM SERPL-SCNC: 140 MMOL/L (ref 136–145)
WBC # BLD AUTO: 4.95 THOUSAND/UL (ref 4.31–10.16)

## 2020-11-20 PROCEDURE — 80053 COMPREHEN METABOLIC PANEL: CPT

## 2020-11-20 PROCEDURE — 85027 COMPLETE CBC AUTOMATED: CPT

## 2020-11-20 PROCEDURE — 36415 COLL VENOUS BLD VENIPUNCTURE: CPT

## 2020-11-23 ENCOUNTER — OFFICE VISIT (OUTPATIENT)
Dept: INTERNAL MEDICINE CLINIC | Facility: CLINIC | Age: 65
End: 2020-11-23
Payer: COMMERCIAL

## 2020-11-23 VITALS
TEMPERATURE: 97.4 F | HEIGHT: 62 IN | BODY MASS INDEX: 25.06 KG/M2 | OXYGEN SATURATION: 97 % | WEIGHT: 136.2 LBS | HEART RATE: 92 BPM | SYSTOLIC BLOOD PRESSURE: 122 MMHG | RESPIRATION RATE: 16 BRPM | DIASTOLIC BLOOD PRESSURE: 68 MMHG

## 2020-11-23 DIAGNOSIS — E55.9 VITAMIN D DEFICIENCY: ICD-10-CM

## 2020-11-23 DIAGNOSIS — J44.1 CHRONIC OBSTRUCTIVE PULMONARY DISEASE WITH ACUTE EXACERBATION (HCC): Primary | ICD-10-CM

## 2020-11-23 DIAGNOSIS — D75.89 MACROCYTOSIS WITHOUT ANEMIA: ICD-10-CM

## 2020-11-23 DIAGNOSIS — C67.0 MALIGNANT NEOPLASM OF TRIGONE OF URINARY BLADDER (HCC): ICD-10-CM

## 2020-11-23 DIAGNOSIS — E78.00 HYPERCHOLESTEROLEMIA: ICD-10-CM

## 2020-11-23 DIAGNOSIS — G47.33 OBSTRUCTIVE SLEEP APNEA: ICD-10-CM

## 2020-11-23 DIAGNOSIS — J96.11 CHRONIC HYPOXEMIC RESPIRATORY FAILURE (HCC): ICD-10-CM

## 2020-11-23 PROCEDURE — 99214 OFFICE O/P EST MOD 30 MIN: CPT | Performed by: INTERNAL MEDICINE

## 2020-11-23 PROCEDURE — 3008F BODY MASS INDEX DOCD: CPT | Performed by: INTERNAL MEDICINE

## 2021-01-22 DIAGNOSIS — E78.2 MIXED HYPERLIPIDEMIA: ICD-10-CM

## 2021-01-22 RX ORDER — ROSUVASTATIN CALCIUM 10 MG/1
TABLET, COATED ORAL
Qty: 90 TABLET | Refills: 1 | Status: SHIPPED | OUTPATIENT
Start: 2021-01-22 | End: 2021-07-19

## 2021-02-08 DIAGNOSIS — J44.9 CHRONIC OBSTRUCTIVE PULMONARY DISEASE, UNSPECIFIED COPD TYPE (HCC): ICD-10-CM

## 2021-02-08 RX ORDER — ALBUTEROL SULFATE 2.5 MG/3ML
2.5 SOLUTION RESPIRATORY (INHALATION) 4 TIMES DAILY
Qty: 360 ML | Refills: 5 | Status: SHIPPED | OUTPATIENT
Start: 2021-02-08 | End: 2021-08-10

## 2021-02-17 ENCOUNTER — PROCEDURE VISIT (OUTPATIENT)
Dept: UROLOGY | Facility: AMBULATORY SURGERY CENTER | Age: 66
End: 2021-02-17
Payer: COMMERCIAL

## 2021-02-17 VITALS
SYSTOLIC BLOOD PRESSURE: 116 MMHG | HEIGHT: 62 IN | DIASTOLIC BLOOD PRESSURE: 74 MMHG | WEIGHT: 123 LBS | HEART RATE: 76 BPM | BODY MASS INDEX: 22.63 KG/M2

## 2021-02-17 DIAGNOSIS — N13.8 BENIGN PROSTATIC HYPERPLASIA WITH URINARY OBSTRUCTION: ICD-10-CM

## 2021-02-17 DIAGNOSIS — N40.1 BENIGN PROSTATIC HYPERPLASIA WITH URINARY OBSTRUCTION: ICD-10-CM

## 2021-02-17 DIAGNOSIS — C67.0 MALIGNANT NEOPLASM OF TRIGONE OF URINARY BLADDER (HCC): Primary | ICD-10-CM

## 2021-02-17 LAB
SL AMB  POCT GLUCOSE, UA: ABNORMAL
SL AMB LEUKOCYTE ESTERASE,UA: ABNORMAL
SL AMB POCT BILIRUBIN,UA: ABNORMAL
SL AMB POCT BLOOD,UA: + 2
SL AMB POCT CLARITY,UA: CLEAR
SL AMB POCT COLOR,UA: YELLOW
SL AMB POCT KETONES,UA: ABNORMAL
SL AMB POCT NITRITE,UA: ABNORMAL
SL AMB POCT PH,UA: 6.5
SL AMB POCT SPECIFIC GRAVITY,UA: 1
SL AMB POCT URINE PROTEIN: ABNORMAL
SL AMB POCT UROBILINOGEN: ABNORMAL

## 2021-02-17 PROCEDURE — 52000 CYSTOURETHROSCOPY: CPT | Performed by: UROLOGY

## 2021-02-17 PROCEDURE — 88112 CYTOPATH CELL ENHANCE TECH: CPT | Performed by: PATHOLOGY

## 2021-02-17 PROCEDURE — 81002 URINALYSIS NONAUTO W/O SCOPE: CPT | Performed by: UROLOGY

## 2021-02-17 PROCEDURE — 99214 OFFICE O/P EST MOD 30 MIN: CPT | Performed by: UROLOGY

## 2021-02-17 RX ORDER — CEFAZOLIN SODIUM 1 G/50ML
1000 SOLUTION INTRAVENOUS ONCE
Status: CANCELLED | OUTPATIENT
Start: 2021-02-17 | End: 2021-02-17

## 2021-02-17 NOTE — LETTER
February 17, 2021     Milena Fraser DO  [address]  [address], [address] [facility],[address]    Patient: Estefani Chaidez  YOB: 1954   Date of Visit: 2/17/2021       Dear Dr Stanton Due: Thank you for referring Benson Torres to me for evaluation  Below are my notes for this consultation  If you have questions, please do not hesitate to call me  I look forward to following your patient along with you  Sincerely,        Chely Lewis MD        CC: No Recipients  Chely Lewis MD  2/17/2021  2:02 PM  Sign when Signing Visit  2/17/2021    Estefani Chaidez   4/30/1954  [id number]        Assessment  Urothelial carcinoma of the bladder   Bladder outlet obstruction    Plan    We discussed findings on cystoscopy today  He is clearly retaining urine  His bladder was full on entry after voiding  We also discussed the abnormality noted near the left ureteral orifice  This requires biopsy and fulguration  Additionally, we discussed TURP for his outlet obstruction  After much reluctant, he seems to agree to proceed  We discussed the technique, risks, benefits in detail  He and his wife understand  Recommend TURP, bladder biopsy, fulguration  Consent was obtained for this procedure  Total visit time was 25 minutes of which over 50% was spent on counseling  History of Present Illness  Silva Sampson is a 61 y o  male  With history of noninvasive low-grade urothelial carcinoma of the bladder  He returns today for follow-up cystoscopy  His only complaint is urinary frequency and urgency, as well as nocturia  He does drink coffee at night  Cystoscopy     Date/Time 2/17/2021 1:59 PM     Performed by  Chely Lewis MD     Authorized by Chely Lewis MD          Procedure Details:  Procedure type: cystoscopy    Additional Procedure Details:   Patient was prepped with Betadine  2% lidocaine jelly was instilled per urethra  The 16 Khmer flexible cystoscope was placed    There is no urethral abnormality  Prostate is noted to be quite short, however there is a median lobe component  This is invaginated into the bladder and produces near-complete obstruction  This is best seen on retroflexion of the scope  Bladder is full on entry, even though patient just voided  There was also significant trabeculation and cellule formation within the bladder throughout  Of note, there is small area adjacent to the left ureteral orifice postero-medially of abnormal looking tissue, likely recurrent superficial carcinoma  AUA SYMPTOM SCORE      Most Recent Value   AUA SYMPTOM SCORE   How often have you had a sensation of not emptying your bladder completely after you finished urinating? 3   How often have you had to urinate again less than two hours after you finished urinating? 3   How often have you found you stopped and started again several times when you urinate?  0   How often have you found it difficult to postpone urination? 3   How often have you had a weak urinary stream?  0   How often have you had to push or strain to begin urination? 0   How many times did you most typically get up to urinate from the time you went to bed at night until the time you got up in the morning? 5   Quality of Life: If you were to spend the rest of your life with your urinary condition just the way it is now, how would you feel about that?  5   AUA SYMPTOM SCORE  14          Review of Systems  Review of Systems   Constitutional: Negative  HENT: Negative  Respiratory: Negative  Cardiovascular: Negative  Gastrointestinal: Negative  Genitourinary:        As per HPI   Musculoskeletal: Negative  Skin: Negative  Neurological: Negative  Hematological: Negative            Past Medical History  Past Medical History:   Diagnosis Date    Bladder mass     [facility]omyopathy [facility])     Chest pain     COPD (chronic obstructive pulmonary disease) (HCC)     Emphysema of lung (HCC)     Hypoxia nocturnal    Left bundle branch block     Multiple pulmonary nodules     last assessed: 10/12/16    Smoker        Past Social History  Past Surgical History:   Procedure Laterality Date    CARDIAC CATHETERIZATION      CYSTOSCOPY      CYSTOSCOPY  2021    NH CYSTOURETHROSCOPY,FULGUR <0 5 CM LESN N/A 1/3/2020    Procedure: TRANSURETHRAL RESECTION OF BLADDER TUMOR (TURBT);   Surgeon: Nedra Friend MD;  [facility]: [facility];  Service: Urology    NH INSTILL ANTICANCER AGENT IN BLADDER N/A 1/3/2020    Procedure: Jaden Otero;  Surgeon: Nedra Friend MD;  [facility];  Service: Urology       Past Family History  Family History   Problem Relation Age of Onset    Diabetes Mother        Past Social history  Social History     Socioeconomic History    Marital status: /Civil Union     Spouse name: Not on file    Number of children: Not on file    Years of education: Not on file    Highest education level: Not on file   Occupational History    Not on file   Social Needs    Financial resource strain: Not on file    Food insecurity     Worry: Not on file     Inability: Not on file   [employer] needs     Medical: Not on file     Non-medical: Not on file   Tobacco Use    Smoking status: Former Smoker     Packs/day: 2 50     Years: 37 00     Pack years: 92 50     Types: Cigarettes     Start date:      Quit date:      Years since quittin 1    Smokeless tobacco: Former User    Tobacco comment: 1 ppd for 37 years, 2010 down to 5 cigs a day, is around second hand smoke   Substance and Sexual Activity    Alcohol use: Not Currently     Comment: rarely    Drug use: No    Sexual activity: Not on file   Lifestyle    Physical activity     Days per week: Not on file     Minutes per session: Not on file    Stress: Not on file   Relationships    Social connections     Talks on phone: Not on file     Gets together: Not on file     Attends Faith service: Not on file     Active member of club or organization: Not on file     Attends meetings of clubs or organizations: Not on file     Relationship status: Not on file    Intimate partner violence     Fear of current or ex partner: Not on file     Emotionally abused: Not on file     Physically abused: Not on file     Forced sexual activity: Not on file   Other Topics Concern    Not on file   Social History Narrative    Daily coffee consumption: 8 or more cups a day         Social History     Tobacco Use   Smoking Status Former Smoker    Packs/day: 2 50    Years: 37 00    Pack years: 92 50    Types: Cigarettes    Start date:     Quit date:     Years since quittin 1   Smokeless Tobacco Former User   Tobacco Comment    1 ppd for 37 years, 2010 down to 5 cigs a day, is around second hand smoke       Current Medications  Current Outpatient Medications   Medication Sig Dispense Refill    albuterol (2 5 mg/3 mL) 0 083 % nebulizer solution Take 1 vial (2 5 mg total) by nebulization 4 (four) times a day 360 mL 5    albuterol (VENTOLIN HFA) 90 mcg/act inhaler Inhale 2 puffs every 6 (six) hours as needed for wheezing 1 Inhaler 0    alendronate (FOSAMAX) 70 mg tablet TAKE 1 TABLET BY MOUTH ONE TIME PER WEEK 12 tablet 1    aspirin 81 MG tablet Take 1 tablet by mouth daily      ergocalciferol (VITAMIN D2) 50,000 units TAKE 1 CAPSULE (50,000 UNITS TOTAL) BY MOUTH EVERY 28 DAYS 3 capsule 1    fluticasone-umeclidinium-vilanterol (TRELEGY ELLIPTA) 100-62 5-25 MCG/INH inhaler Inhale 1 puff daily Rinse mouth after use   1 Inhaler 11    guaiFENesin (ROBITUSSIN) 100 MG/5ML oral liquid Take 200 mg by mouth 3 (three) times a day as needed for cough      hydrocodone-chlorpheniramine polistirex (TUSSIONEX) 10-8 mg/5 mL ER suspension Take 5 mL by mouth every 12 (twelve) hours as needed for coughMax Daily Amount: 10 mL 120 mL 0    ipratropium (ATROVENT) 0 02 % nebulizer solution Take 1 vial (0 5 mg total) by nebulization 4 (four) times a day 120 vial 6    pantoprazole (PROTONIX) 40 mg tablet Take by mouth Daily      predniSONE 10 mg tablet Take 2 tablets by mouth daily      predniSONE 20 mg tablet Take 2 tablets (40 mg total) by mouth daily 10 tablet 0    rosuvastatin (CRESTOR) 10 MG tablet TAKE 1 TABLET BY MOUTH EVERY DAY 90 tablet 1    predniSONE 10 mg tablet Take 1 tablet (10 mg total) by mouth daily (Patient not taking: Reported on 11/23/2020) 5 tablet 0     No current facility-administered medications for this visit  Allergies  No Known Allergies    Past Medical History, Social History, Family History, medications and allergies were reviewed  Vitals  Vitals:    02/17/21 1330   BP: 116/74   BP Location: Left arm   Patient Position: Sitting   Cuff Size: Adult   Pulse: 76   Weight: 55 8 kg (123 lb)   Height: 5' 2" (1 575 m)       Physical Exam  Physical Exam  Vitals signs reviewed  Constitutional:       Appearance: He is well-developed  HENT:      Head: Normocephalic and atraumatic  Eyes:      Conjunctiva/sclera: Conjunctivae normal    Cardiovascular:      Rate and Rhythm: Normal rate and regular rhythm  Pulmonary:      Effort: Pulmonary effort is normal       Breath sounds: Normal breath sounds  Abdominal:      Palpations: Abdomen is soft  Genitourinary:     Penis: Normal     Musculoskeletal: Normal range of motion  Skin:     General: Skin is warm and dry  Neurological:      Mental Status: He is alert and oriented to person, place, and time     Psychiatric:         Mood and Affect: Mood normal            Results  Lab Results   Component Value Date    PSA 1 6 08/05/2019    PSA 1 8 08/21/2017    PSA 2 3 08/15/2016     Lab Results   Component Value Date    GLUCOSE 90 08/17/2015    CALCIUM 9 3 11/20/2020     08/17/2015    K 4 6 11/20/2020    CO2 33 (H) 11/20/2020     11/20/2020    BUN 18 11/20/2020    CREATININE 1 03 11/20/2020     Lab Results   Component Value Date    WBC 4 95 11/20/2020    HGB 14 3 11/20/2020    HCT 45 8 11/20/2020     (H) 11/20/2020     11/20/2020

## 2021-02-17 NOTE — PROGRESS NOTES
2/17/2021    [address] [place] [address]   4/30/1954  [id number]        Assessment  Urothelial carcinoma of the bladder   Bladder outlet obstruction    Plan    We discussed findings on cystoscopy today  He is clearly retaining urine  His bladder was full on entry after voiding  We also discussed the abnormality noted near the left ureteral orifice  This requires biopsy and fulguration  Additionally, we discussed TURP for his outlet obstruction  After much reluctant, he seems to agree to proceed  We discussed the technique, risks, benefits in detail  He and his wife understand  Recommend TURP, bladder biopsy, fulguration  Consent was obtained for this procedure  Total visit time was 25 minutes of which over 50% was spent on counseling  History of Present Illness  José Miguel Jewell is a 61 y o  male  With history of noninvasive low-grade urothelial carcinoma of the bladder  He returns today for follow-up cystoscopy  His only complaint is urinary frequency and urgency, as well as nocturia  He does drink coffee at night  Cystoscopy     Date/Time 2/17/2021 1:59 PM     Performed by  Deedee Magaña MD     Authorized by Deedee Magaña MD          Procedure Details:  Procedure type: cystoscopy    Additional Procedure Details:   Patient was prepped with Betadine  2% lidocaine jelly was instilled per urethra  The 16 Frisian flexible cystoscope was placed  There is no urethral abnormality  Prostate is noted to be quite short, however there is a median lobe component  This is invaginated into the bladder and produces near-complete obstruction  This is best seen on retroflexion of the scope  Bladder is full on entry, even though patient just voided  There was also significant trabeculation and cellule formation within the bladder throughout  Of note, there is small area adjacent to the left ureteral orifice postero-medially of abnormal looking tissue, likely recurrent superficial carcinoma            AUA SYMPTOM SCORE      Most Recent Value   AUA SYMPTOM SCORE   How often have you had a sensation of not emptying your bladder completely after you finished urinating? 3   How often have you had to urinate again less than two hours after you finished urinating? 3   How often have you found you stopped and started again several times when you urinate?  0   How often have you found it difficult to postpone urination? 3   How often have you had a weak urinary stream?  0   How often have you had to push or strain to begin urination? 0   How many times did you most typically get up to urinate from the time you went to bed at night until the time you got up in the morning? 5   Quality of Life: If you were to spend the rest of your life with your urinary condition just the way it is now, how would you feel about that?  5   AUA SYMPTOM SCORE  14          Review of Systems  Review of Systems   Constitutional: Negative  HENT: Negative  Respiratory: Negative  Cardiovascular: Negative  Gastrointestinal: Negative  Genitourinary:        As per HPI   Musculoskeletal: Negative  Skin: Negative  Neurological: Negative  Hematological: Negative  Past Medical History  Past Medical History:   Diagnosis Date    Bladder mass     Cardiomyopathy [facility])     Chest pain     COPD (chronic obstructive pulmonary disease) (HCC)     Emphysema of lung (HCC)     Hypoxia     nocturnal    Left bundle branch block     Multiple pulmonary nodules     last assessed: 10/12/16    Smoker        Past Social History  Past Surgical History:   Procedure Laterality Date    CARDIAC CATHETERIZATION      CYSTOSCOPY      CYSTOSCOPY  02/17/2021    NM CYSTOURETHROSCOPY,FULGUR <0 5 CM LESN N/A 1/3/2020    Procedure: TRANSURETHRAL RESECTION OF BLADDER TUMOR (TURBT);   Surgeon: Marie Floyd MD;  [facility];  Service: Urology    NM INSTILL ANTICANCER AGENT IN BLADDER N/A 1/3/2020    Procedure: INSTILLATION MITOMYCIN; Surgeon: Nilay Yang MD;  [facility];  Service: Urology       Past Family History  Family History   Problem Relation Age of Onset    Diabetes Mother        Past Social history  Social History     Socioeconomic History    Marital status: /Civil Union     Spouse name: Not on file    Number of children: Not on file    Years of education: Not on file    Highest education level: Not on file   Occupational History    Not on file   Social Needs    Financial resource strain: Not on file    Food insecurity     Worry: Not on file     Inability: Not on file   [employer] needs     Medical: Not on file     Non-medical: Not on file   Tobacco Use    Smoking status: Former Smoker     Packs/day: 2 50     Years: 37 00     Pack years: 92 50     Types: Cigarettes     Start date:      Quit date:      Years since quittin 1    Smokeless tobacco: Former User    Tobacco comment: 1 ppd for 37 years, 2010 down to 5 cigs a day, is around second hand smoke   Substance and Sexual Activity    Alcohol use: Not Currently     Comment: rarely    Drug use: No    Sexual activity: Not on file   Lifestyle    Physical activity     Days per week: Not on file     Minutes per session: Not on file    Stress: Not on file   Relationships    Social connections     Talks on phone: Not on file     Gets together: Not on file     Attends Sabianist service: Not on file     Active member of club or organization: Not on file     Attends meetings of clubs or organizations: Not on file     Relationship status: Not on file    Intimate partner violence     Fear of current or ex partner: Not on file     Emotionally abused: Not on file     Physically abused: Not on file     Forced sexual activity: Not on file   Other Topics Concern    Not on file   Social History Narrative    Daily coffee consumption: 8 or more cups a day         Social History     Tobacco Use   Smoking Status Former Smoker    Packs/day: 2 50    Years: 37 00    Pack years: 92 50    Types: Cigarettes    Start date: 5    Quit date: 2012    Years since quittin 1   Smokeless Tobacco Former User   Tobacco Comment    1 ppd for 37 years,  down to 5 cigs a day, is around second hand smoke       Current Medications  Current Outpatient Medications   Medication Sig Dispense Refill    albuterol (2 5 mg/3 mL) 0 083 % nebulizer solution Take 1 vial (2 5 mg total) by nebulization 4 (four) times a day 360 mL 5    albuterol (VENTOLIN HFA) 90 mcg/act inhaler Inhale 2 puffs every 6 (six) hours as needed for wheezing 1 Inhaler 0    alendronate (FOSAMAX) 70 mg tablet TAKE 1 TABLET BY MOUTH ONE TIME PER WEEK 12 tablet 1    aspirin 81 MG tablet Take 1 tablet by mouth daily      ergocalciferol (VITAMIN D2) 50,000 units TAKE 1 CAPSULE (50,000 UNITS TOTAL) BY MOUTH EVERY 28 DAYS 3 capsule 1    fluticasone-umeclidinium-vilanterol (TRELEGY ELLIPTA) 100-62 5-25 MCG/INH inhaler Inhale 1 puff daily Rinse mouth after use  1 Inhaler 11    guaiFENesin (ROBITUSSIN) 100 MG/5ML oral liquid Take 200 mg by mouth 3 (three) times a day as needed for cough      hydrocodone-chlorpheniramine polistirex (TUSSIONEX) 10-8 mg/5 mL ER suspension Take 5 mL by mouth every 12 (twelve) hours as needed for coughMax Daily Amount: 10 mL 120 mL 0    ipratropium (ATROVENT) 0 02 % nebulizer solution Take 1 vial (0 5 mg total) by nebulization 4 (four) times a day 120 vial 6    pantoprazole (PROTONIX) 40 mg tablet Take by mouth Daily      predniSONE 10 mg tablet Take 2 tablets by mouth daily      predniSONE 20 mg tablet Take 2 tablets (40 mg total) by mouth daily 10 tablet 0    rosuvastatin (CRESTOR) 10 MG tablet TAKE 1 TABLET BY MOUTH EVERY DAY 90 tablet 1    predniSONE 10 mg tablet Take 1 tablet (10 mg total) by mouth daily (Patient not taking: Reported on 2020) 5 tablet 0     No current facility-administered medications for this visit          Allergies  No Known Allergies    Past Medical History, Social History, Family History, medications and allergies were reviewed  Vitals  Vitals:    02/17/21 1330   BP: 116/74   BP Location: Left arm   Patient Position: Sitting   Cuff Size: Adult   Pulse: 76   Weight: 55 8 kg (123 lb)   Height: 5' 2" (1 575 m)       Physical Exam  Physical Exam  Vitals signs reviewed  Constitutional:       Appearance: He is well-developed  HENT:      Head: Normocephalic and atraumatic  Eyes:      Conjunctiva/sclera: Conjunctivae normal    Cardiovascular:      Rate and Rhythm: Normal rate and regular rhythm  Pulmonary:      Effort: Pulmonary effort is normal       Breath sounds: Normal breath sounds  Abdominal:      Palpations: Abdomen is soft  Genitourinary:     Penis: Normal     Musculoskeletal: Normal range of motion  Skin:     General: Skin is warm and dry  Neurological:      Mental Status: He is alert and oriented to person, place, and time     Psychiatric:         Mood and Affect: Mood normal            Results  Lab Results   Component Value Date    PSA 1 6 08/05/2019    PSA 1 8 08/21/2017    PSA 2 3 08/15/2016     Lab Results   Component Value Date    GLUCOSE 90 08/17/2015    CALCIUM 9 3 11/20/2020     08/17/2015    K 4 6 11/20/2020    CO2 33 (H) 11/20/2020     11/20/2020    BUN 18 11/20/2020    CREATININE 1 03 11/20/2020     Lab Results   Component Value Date    WBC 4 95 11/20/2020    HGB 14 3 11/20/2020    HCT 45 8 11/20/2020     (H) 11/20/2020     11/20/2020

## 2021-02-22 ENCOUNTER — TELEPHONE (OUTPATIENT)
Dept: UROLOGY | Facility: AMBULATORY SURGERY CENTER | Age: 66
End: 2021-02-22

## 2021-02-22 ENCOUNTER — PREP FOR PROCEDURE (OUTPATIENT)
Dept: UROLOGY | Facility: AMBULATORY SURGERY CENTER | Age: 66
End: 2021-02-22

## 2021-02-22 DIAGNOSIS — N40.1 BENIGN PROSTATIC HYPERPLASIA WITH URINARY OBSTRUCTION: ICD-10-CM

## 2021-02-22 DIAGNOSIS — N13.8 BENIGN PROSTATIC HYPERPLASIA WITH URINARY OBSTRUCTION: ICD-10-CM

## 2021-02-22 DIAGNOSIS — C67.0 MALIGNANT NEOPLASM OF TRIGONE OF URINARY BLADDER (HCC): Primary | ICD-10-CM

## 2021-02-22 DIAGNOSIS — J44.1 CHRONIC OBSTRUCTIVE PULMONARY DISEASE WITH ACUTE EXACERBATION (HCC): ICD-10-CM

## 2021-02-22 DIAGNOSIS — R39.89 SUSPECTED UTI: ICD-10-CM

## 2021-02-22 DIAGNOSIS — Z01.818 PRE-OPERATIVE CLEARANCE: ICD-10-CM

## 2021-02-22 DIAGNOSIS — Z11.59 SCREENING FOR VIRAL DISEASE: ICD-10-CM

## 2021-02-22 NOTE — TELEPHONE ENCOUNTER
I called pt to discuss scheduling sx with Dr Miley Fine  There was no answer so I did leave a voicemail asking pt to call me back to discuss

## 2021-02-22 NOTE — TELEPHONE ENCOUNTER
Pt  Returned my call and I was able to speak with him  I offered to schedule him for his cysto/TURP/bladder bx at the [facility] on 3/11/21 with Dr, Guss Galeazzi  Pt declined that date and stated that he only want to have this done at the [facility]  I did explain that the next available time I have with Dr Canchola at the [facility] is not until 4/13/21  Pt stated that 4/13 will work for him, I verbally went over all of pt 's pre op instructions and prep information with him  Pt is aware that he will need pre op testing done and most likely a clearance prior to surgery  Per pt 's request I will check with Dr Guss Galeazzi to confirm what clearance is needed and then get back to them  Pt also requested that I mail him a copy of his surgical packet and instructed them to call me with any questions or concerns regarding this procedure

## 2021-02-23 ENCOUNTER — TELEPHONE (OUTPATIENT)
Dept: PULMONOLOGY | Facility: CLINIC | Age: 66
End: 2021-02-23

## 2021-02-23 NOTE — TELEPHONE ENCOUNTER
Established patient needing pre-op clearance  Surgery: TRANSURETHRAL RESECTION OF PROSTATE (TURP) BLADDER BIOPSY, FULGURATION (N/A Abdomen)       CYSTOSCOPY WITH BIOPSIES (N/A Bladder)  Surgery date: 4/13/21  Last seen by us: 11/19/20  On oxygen: Yes  Kind of Sedation: General  Length of surgery: 75 minutes  Surgeon: Dr Lawson Horne  Office contact: Betty Nicholas  Form to complete: No, Document in chart    Patient is already scheduled for 2/25

## 2021-02-23 NOTE — TELEPHONE ENCOUNTER
I spoke with pt this afternoon and confirmed that Dr Irina Bang would like him to have a pulmonary clearance prior to sx on 4/13/21 along with a cardiac clearance  Pt verbalized understanding, I also went over pt 's pre op instructions and prep information with him again  Pt requested that I mail him a copy of his surgical packet

## 2021-02-25 ENCOUNTER — OFFICE VISIT (OUTPATIENT)
Dept: PULMONOLOGY | Facility: CLINIC | Age: 66
End: 2021-02-25
Payer: COMMERCIAL

## 2021-02-25 VITALS
SYSTOLIC BLOOD PRESSURE: 110 MMHG | TEMPERATURE: 98.7 F | WEIGHT: 126 LBS | DIASTOLIC BLOOD PRESSURE: 70 MMHG | BODY MASS INDEX: 23.19 KG/M2 | HEIGHT: 62 IN | HEART RATE: 91 BPM | OXYGEN SATURATION: 91 %

## 2021-02-25 DIAGNOSIS — J44.9 OSA AND COPD OVERLAP SYNDROME (HCC): ICD-10-CM

## 2021-02-25 DIAGNOSIS — G47.33 OSA AND COPD OVERLAP SYNDROME (HCC): ICD-10-CM

## 2021-02-25 DIAGNOSIS — Z01.818 PREOPERATIVE CLEARANCE: ICD-10-CM

## 2021-02-25 DIAGNOSIS — J96.11 CHRONIC HYPOXEMIC RESPIRATORY FAILURE (HCC): ICD-10-CM

## 2021-02-25 DIAGNOSIS — J44.9 CHRONIC OBSTRUCTIVE PULMONARY DISEASE, UNSPECIFIED COPD TYPE (HCC): Primary | ICD-10-CM

## 2021-02-25 DIAGNOSIS — Z23 ENCOUNTER FOR IMMUNIZATION: ICD-10-CM

## 2021-02-25 PROCEDURE — 99215 OFFICE O/P EST HI 40 MIN: CPT | Performed by: INTERNAL MEDICINE

## 2021-02-25 PROCEDURE — 1036F TOBACCO NON-USER: CPT | Performed by: INTERNAL MEDICINE

## 2021-02-25 PROCEDURE — 3008F BODY MASS INDEX DOCD: CPT | Performed by: INTERNAL MEDICINE

## 2021-02-25 PROCEDURE — 90670 PCV13 VACCINE IM: CPT

## 2021-02-25 PROCEDURE — G0009 ADMIN PNEUMOCOCCAL VACCINE: HCPCS

## 2021-02-25 RX ORDER — ALBUTEROL SULFATE 90 UG/1
2 AEROSOL, METERED RESPIRATORY (INHALATION) EVERY 6 HOURS PRN
Qty: 1 INHALER | Refills: 11 | Status: SHIPPED | OUTPATIENT
Start: 2021-02-25 | End: 2021-05-20 | Stop reason: SDUPTHER

## 2021-02-25 RX ORDER — PREDNISONE 10 MG/1
10 TABLET ORAL DAILY
Qty: 90 TABLET | Refills: 3 | Status: SHIPPED | OUTPATIENT
Start: 2021-02-25 | End: 2021-09-22 | Stop reason: SDUPTHER

## 2021-02-25 NOTE — ASSESSMENT & PLAN NOTE
· PFT August 2013: FEV1 0 86 (27% predicted), residual volume 23% predicted, DLCO 47% predicted  · LCS CT April 2018: Stable emphysema  New 7 mm ill-defined nodule left lower lobe  · Unable to do pulmonary rehab because of cost  · 8/31/2020:  No change in symptoms, mild to moderate dyspnea on exertion, uses long-term oxygen therapy upon ambulation, and sleep    · He is adherent to the use Trelegy every day, DuoNeb nebulizer q 6 hours and daily prednisone 10 mg

## 2021-02-25 NOTE — PATIENT INSTRUCTIONS
Prednisone (By mouth)   Prednisone (PRED-ni-sone)  Treats many diseases and conditions, especially problems related to inflammation  This medicine is a corticosteroid  Brand Name(s): Hong, predniSONE Intensol   There may be other brand names for this medicine  When This Medicine Should Not Be Used: This medicine is not right for everyone  Do not use if you had an allergic reaction to prednisone or if you are pregnant  How to Use This Medicine:   Liquid, Tablet, Delayed Release Tablet  · Take your medicine as directed  Your dose may need to be changed several times to find what works best for you  · It is best to take this medicine with food or milk  · Swallow the delayed-release tablet whole  Do not crush, break, or chew it  · Measure the oral liquid medicine with a marked measuring spoon, oral syringe, or medicine cup  · Missed dose: Take a dose as soon as you remember  If it is almost time for your next dose, wait until then and take a regular dose  Do not take extra medicine to make up for a missed dose  · Store the medicine in a closed container at room temperature, away from heat, moisture, and direct light  Do not freeze the oral liquid  Drugs and Foods to Avoid:   Ask your doctor or pharmacist before using any other medicine, including over-the-counter medicines, vitamins, and herbal products  · Tell your doctor if you use any of the following:  ? Aminoglutethimide, amphotericin B, carbamazepine, cholestyramine, cyclosporine, digoxin, isoniazid, ketoconazole, phenobarbital, phenytoin, or rifampin  ? Blood thinner, such as warfarin  ? NSAID pain or arthritis medicine, such as aspirin, diclofenac, ibuprofen, naproxen, celecoxib  ? Diuretic (water pill)  ? Diabetes medicine  ? Macrolide antibiotic, such as azithromycin, clarithromycin, erythromycin  ? Estrogen, including birth control pills or hormone replacement therapy  · This medicine may interfere with vaccines   Ask your doctor before you get a flu shot or any other vaccines  Warnings While Using This Medicine:   · It is not safe to take this medicine during pregnancy  It could harm an unborn baby  Tell your doctor right away if you become pregnant  · Tell your doctor if you are breastfeeding or if you have kidney problems, heart failure, high blood pressure, a recent heart attack, diabetes, glaucoma, osteoporosis, or thyroid problems  Tell your doctor about any infection you have  Also tell your doctor if you have had mental or emotional problems (such as depression) or stomach or bowel problems (such as an ulcer or diverticulitis)  · This medicine may cause the following problems:  ? Mood or behavior changes  ? Higher blood pressure, retaining water, changes in salt or potassium levels in your body  ? Cataracts or glaucoma (with long-term use)  ? Weak bones or osteoporosis (with long-term use)  ? Slow growth in children (with long-term use)  ? Muscle problems (with high doses, especially if you have myasthenia gravis or similar nerve and muscle problems)  · Do not stop using this medicine suddenly  Your doctor will need to slowly decrease your dose before you stop it completely  · This medicine could cause you to get infections more easily  Tell your doctor right away if you are exposed to chicken pox, measles, or other serious infection  Tell your doctor if you had a serious infection in the past, such as tuberculosis or herpes  · Tell your doctor about any extra stress or anxiety in your life  Your dose might need to be changed for a short time  · Tell any doctor or dentist who treats you that you are using this medicine  This medicine may affect certain medical test results  · Keep all medicine out of the reach of children  Never share your medicine with anyone    Possible Side Effects While Using This Medicine:   Call your doctor right away if you notice any of these side effects:  · Allergic reaction: Itching or hives, swelling in your face or hands, swelling or tingling in your mouth or throat, chest tightness, trouble breathing  · Dark freckles, skin color changes, coldness, weakness, tiredness, nausea, vomiting, weight loss  · Depression, unusual thoughts, feelings, or behaviors, trouble sleeping  · Fever, chills, cough, sore throat, and body aches  · Muscle pain or weakness  · Rapid weight gain, swelling in your hands, ankles, or feet  · Severe stomach pain, nausea, vomiting, or red or black stools  · Skin changes or growths  · Trouble seeing, eye pain, headache  If you notice these less serious side effects, talk with your doctor:   · Increased appetite  · Round, puffy face  · Weight gain around your neck, upper back, breast, face, or waist  If you notice other side effects that you think are caused by this medicine, tell your doctor  Call your doctor for medical advice about side effects  You may report side effects to FDA at 9-794-FDA-2123  © Copyright [address] Information is for End User's use only and may not be sold, redistributed or otherwise used for commercial purposes  The above information is an  only  It is not intended as medical advice for individual conditions or treatments  Talk to your doctor, nurse or pharmacist before following any medical regimen to see if it is safe and effective for you  COPD, Ambulatory Care   GENERAL INFORMATION:   COPD (chronic obstructive pulmonary disease)  is a lung disease that makes it hard for you to breathe  COPD is usually a result of lung damage caused by years of irritation and inflammation  COPD limits air flow in your lungs  Smoking, pollution, genetics, or a history of lung infections can increase your risk for COPD     Common symptoms include the following:   · Shortness of breath     · A dry cough     · Coughing fits that bring up mucus from your lungs     · Wheezing and chest tightness  Seek immediate care for the following symptoms:   · Confusion, dizziness, or lightheadedness    · Red, swollen, warm arm or leg    · Shortness of breath or chest pain    · Coughing up blood  Treatment for COPD  may include medicines to help decrease swelling and inflammation in your lungs  Medicines may also help open your airways or treat and infection  You may need pulmonary rehabilitation to help you manage your symptoms and improve your quality of life  You may need extra oxygen to help you breathe easier  Manage COPD and prevent an exacerbation:   · Do not smoke, and avoid others who smoke  If you smoke, it is never too late to quit  You may have fewer exacerbations  Ask for information about medicines and support programs that can help you quit  · Avoid triggers that make your symptoms worse  Cold weather and sudden temperature changes can trigger an exacerbation  Fumes from cars and chemicals, air pollution, and perfume can also increase your symptoms  · Use pursed-lip breathing when you feel short of breath  Take a deep breath in through your nose  Slowly breathe out through your mouth with your lips pursed for twice as long as you inhaled  You can also practice this breathing pattern while you bend, lift, climb stairs, or exercise  Pursed-lip breathing slows down your breathing and helps move more air in and out of your lungs  · Exercise for at least 20 minutes each day  Exercise can help increase your energy and decrease shortness of breath  Ask about the best exercise plan for you  · Prevent infections that can be dangerous when you have COPD  Get a flu vaccine every year as soon as it becomes available  Ask if you should also get other vaccines, such as those given to prevent pneumonia and tetanus  Avoid people who are sick, and wash your hands often  Follow up with your healthcare provider as directed:  Write down your questions so you remember to ask them during your visits  CARE AGREEMENT:   You have the right to help plan your care   Learn about your health condition and how it may be treated  Discuss treatment options with your caregivers to decide what care you want to receive  You always have the right to refuse treatment  The above information is an  only  It is not intended as medical advice for individual conditions or treatments  Talk to your doctor, nurse or pharmacist before following any medical regimen to see if it is safe and effective for you  © 2014 [address] is for End User's use only and may not be sold, redistributed or otherwise used for commercial purposes  All illustrations and images included in CareNotes® are the copyrighted property of A D A [facility]  or Saravanan Crawford

## 2021-02-25 NOTE — ASSESSMENT & PLAN NOTE
·  Patient is scheduled to have transurethral prostate resection in April with general anesthesia  ·  the patient's overall considered high risk for perioperative pulmonary and respiratory complications including but not limited to vent dependent respiratory failure,  Pneumonia, atelectasis and pneumothorax  ·  to mitigate the risk for the perioperative complications recommend aggressive pulmonary toilet with bronchodilator therapy nebulizers perioperatively, use BiPAP as needed, minimize general anesthesia, DVT prophylaxis, a early ambulation, and incentive spirometer

## 2021-02-25 NOTE — ASSESSMENT & PLAN NOTE
·  Patient would qualify for Prevnar 13 today based on his chronic respiratory failure and end-stage lung disease

## 2021-02-25 NOTE — ASSESSMENT & PLAN NOTE
·  Using CPAP adherent using it every night encouraged him and counseled for the importance of adherence to CPAP

## 2021-02-25 NOTE — PROGRESS NOTES
Pulmonary Follow Up Note   Luis Paris  61 y o  male MRN: [id number]  2/25/2021      Assessment and Plan:    1  Chronic obstructive pulmonary disease, unspecified COPD type [facility])  Assessment & Plan:  · PFT August 2013: FEV1 0 86 (27% predicted), residual volume 23% predicted, DLCO 47% predicted  · LCS CT April 2018: Stable emphysema  New 7 mm ill-defined nodule left lower lobe  · Unable to do pulmonary rehab because of cost  · 8/31/2020:  No change in symptoms, mild to moderate dyspnea on exertion, uses long-term oxygen therapy upon ambulation, and sleep  · He is adherent to the use Trelegy every day, DuoNeb nebulizer q 6 hours and daily prednisone 10 mg      Orders:  -     albuterol (Ventolin HFA) 90 mcg/act inhaler; Inhale 2 puffs every 6 (six) hours as needed for wheezing  -     ipratropium (ATROVENT) 0 02 % nebulizer solution; Take 1 vial (0 5 mg total) by nebulization 4 (four) times a day  -     predniSONE 10 mg tablet; Take 1 tablet (10 mg total) by mouth daily  -     PNEUMOCOCCAL CONJUGATE VACCINE 13-VALENT GREATER THAN 6 MONTHS    2  Encounter for immunization   Assessment & Plan:  ·  Patient would qualify for Prevnar 13 today based on his chronic respiratory failure and end-stage lung disease    Orders:  -     PNEUMOCOCCAL CONJUGATE VACCINE 13-VALENT GREATER THAN 6 MONTHS    3  KEVIN and COPD overlap syndrome (HCC)  Assessment & Plan:  ·  Using CPAP adherent using it every night encouraged him and counseled for the importance of adherence to CPAP      4  Chronic hypoxemic respiratory failure (HCC)  Assessment & Plan:  ·  On long-term oxygen therapy 2 liters/minute 24/7      5   Preoperative clearance  Assessment & Plan:  ·  Patient is scheduled to have transurethral prostate resection in April with general anesthesia  ·  the patient's overall considered high risk for perioperative pulmonary and respiratory complications including but not limited to vent dependent respiratory failure,  Pneumonia, atelectasis and pneumothorax  ·  to mitigate the risk for the perioperative complications recommend aggressive pulmonary toilet with bronchodilator therapy nebulizers perioperatively, use BiPAP as needed, minimize general anesthesia, DVT prophylaxis, a early ambulation, and incentive spirometer          Return in about 1 year (around 2/25/2022)  History of Present Illness   HPI:  Rosa Naik  is a 61 y o  male who  Has complicated past medical history and end-stage lung disease as detailed below here today for medication refills and follow-up visit and requiring a preoperative clearance for transurethral prostate resection in April of 2021  Patient has no change of symptoms he is still dependent on his nebulizer 4 to 5 times a day he is adherent using his Trelegy inhaler daily and he wears CPAP every night  He has chronic daily cough with no new sputum production or hemoptysis  Review of Systems   All other systems reviewed and are negative  Historical Information   Past Medical History:   Diagnosis Date    Bladder mass     Cardiomyopathy [facility])     Chest pain     COPD (chronic obstructive pulmonary disease) (HCC)     Emphysema of lung (HCC)     Hypoxia     nocturnal    Left bundle branch block     Multiple pulmonary nodules     last assessed: 10/12/16    Smoker      Past Surgical History:   Procedure Laterality Date    CARDIAC CATHETERIZATION      CYSTOSCOPY      CYSTOSCOPY  02/17/2021    WI CYSTOURETHROSCOPY,FULGUR <0 5 CM LESN N/A 1/3/2020    Procedure: TRANSURETHRAL RESECTION OF BLADDER TUMOR (TURBT);   Surgeon: Kathrine Marshall MD;  [facility];  Service: Urology    WI INSTILL ANTICANCER AGENT IN BLADDER N/A 1/3/2020    Procedure: Dali Harper;  Surgeon: Kathrine Marshall MD;  [facility];  Service: Urology     Family History   Problem Relation Age of Onset    Diabetes Mother          Meds/Allergies     Current Outpatient Medications:     albuterol (2 5 mg/3 mL) 0 083 % nebulizer solution, Take 1 vial (2 5 mg total) by nebulization 4 (four) times a day, Disp: 360 mL, Rfl: 5    albuterol (Ventolin HFA) 90 mcg/act inhaler, Inhale 2 puffs every 6 (six) hours as needed for wheezing, Disp: 1 Inhaler, Rfl: 11    alendronate (FOSAMAX) 70 mg tablet, TAKE 1 TABLET BY MOUTH ONE TIME PER WEEK, Disp: 12 tablet, Rfl: 1    aspirin 81 MG tablet, Take 1 tablet by mouth daily, Disp: , Rfl:     ergocalciferol (VITAMIN D2) 50,000 units, TAKE 1 CAPSULE (50,000 UNITS TOTAL) BY MOUTH EVERY 28 DAYS, Disp: 3 capsule, Rfl: 1    fluticasone-umeclidinium-vilanterol (TRELEGY ELLIPTA) 100-62 5-25 MCG/INH inhaler, Inhale 1 puff daily Rinse mouth after use , Disp: 1 Inhaler, Rfl: 11    guaiFENesin (ROBITUSSIN) 100 MG/5ML oral liquid, Take 200 mg by mouth 3 (three) times a day as needed for cough, Disp: , Rfl:     hydrocodone-chlorpheniramine polistirex (TUSSIONEX) 10-8 mg/5 mL ER suspension, Take 5 mL by mouth every 12 (twelve) hours as needed for coughMax Daily Amount: 10 mL, Disp: 120 mL, Rfl: 0    ipratropium (ATROVENT) 0 02 % nebulizer solution, Take 1 vial (0 5 mg total) by nebulization 4 (four) times a day, Disp: 120 vial, Rfl: 6    pantoprazole (PROTONIX) 40 mg tablet, Take by mouth Daily, Disp: , Rfl:     predniSONE 10 mg tablet, Take 2 tablets by mouth daily, Disp: , Rfl:     rosuvastatin (CRESTOR) 10 MG tablet, TAKE 1 TABLET BY MOUTH EVERY DAY, Disp: 90 tablet, Rfl: 1    predniSONE 10 mg tablet, Take 1 tablet (10 mg total) by mouth daily, Disp: 90 tablet, Rfl: 3    predniSONE 20 mg tablet, Take 2 tablets (40 mg total) by mouth daily (Patient not taking: Reported on 2/25/2021), Disp: 10 tablet, Rfl: 0  No Known Allergies    Vitals: Blood pressure 110/70, pulse 91, temperature 98 7 °F (37 1 °C), temperature source Tympanic, height 5' 2" (1 575 m), weight 57 2 kg (126 lb), SpO2 91 %  Body mass index is 23 05 kg/m²   Oxygen Therapy  SpO2: 91 %  Oxygen Therapy: Supplemental oxygen  O2 Delivery Method: Nasal cannula  O2 Flow Rate (L/min): 2 L/min      Physical Exam  Physical Exam  Constitutional:       General: He is not in acute distress  Appearance: Normal appearance  He is not toxic-appearing or diaphoretic  HENT:      Head: Normocephalic and atraumatic  Nose: Nose normal  No congestion or rhinorrhea  Mouth/Throat:      Mouth: Mucous membranes are moist       Pharynx: Oropharynx is clear  No oropharyngeal exudate or posterior oropharyngeal erythema  Eyes:      General: No scleral icterus  Right eye: No discharge  Left eye: No discharge  Extraocular Movements: Extraocular movements intact  Conjunctiva/sclera: Conjunctivae normal       Pupils: Pupils are equal, round, and reactive to light  Neck:      Musculoskeletal: No neck rigidity  Cardiovascular:      Rate and Rhythm: Normal rate and regular rhythm  Pulses: Normal pulses  Heart sounds: Normal heart sounds  No murmur  No gallop  Pulmonary:      Effort: Pulmonary effort is normal  No respiratory distress  Breath sounds: No rhonchi or rales  Comments: Bilateral diminished air entry diffuse crackles and occasional wheezes  Abdominal:      General: Abdomen is flat  Bowel sounds are normal  There is no distension  Palpations: Abdomen is soft  Tenderness: There is no abdominal tenderness  Musculoskeletal:         General: No swelling, tenderness or deformity  Skin:     General: Skin is warm and dry  Coloration: Skin is not jaundiced or pale  Findings: No bruising or erythema  Neurological:      General: No focal deficit present  Mental Status: He is alert and oriented to person, place, and time  Mental status is at baseline  Cranial Nerves: No cranial nerve deficit  Motor: No weakness  Psychiatric:         Mood and Affect: Mood normal          Behavior: Behavior normal          Thought Content:  Thought content normal  Judgment: Judgment normal          Labs: I have personally reviewed pertinent lab results  , ABG: No results found for: PHART, CCH8ZWZ, PO2ART, DHR6VDY, Q1UQMWJJ, BEART, SOURCE, BNP: No results found for: BNP, CBC: No results found for: WBC, HGB, HCT, MCV, PLT, ADJUSTEDWBC, MCH, MCHC, RDW, MPV, NRBC, CMP: No results found for: SODIUM, K, CL, CO2, ANIONGAP, BUN, CREATININE, GLUCOSE, CALCIUM, AST, ALT, ALKPHOS, PROT, BILITOT, EGFR, PT/INR: No results found for: PT, INR, Troponin: No results found for: TROPONINI  Lab Results   Component Value Date    WBC 4 95 11/20/2020    HGB 14 3 11/20/2020    HCT 45 8 11/20/2020     (H) 11/20/2020     11/20/2020     Lab Results   Component Value Date    GLUCOSE 90 08/17/2015    CALCIUM 9 3 11/20/2020     08/17/2015    K 4 6 11/20/2020    CO2 33 (H) 11/20/2020     11/20/2020    BUN 18 11/20/2020    CREATININE 1 03 11/20/2020     No results found for: IGE  Lab Results   Component Value Date    ALT 27 11/20/2020    AST 19 11/20/2020    ALKPHOS 74 11/20/2020    BILITOT 0 60 08/17/2015         Imaging and other studies: I have personally reviewed pertinent films in PACS  CT chest 8/2020  FINDINGS:     LUNGS:  There has been interval resolution of the nodular densities in the left upper and left lower lobes seen on the most recent study  3 mm right upper lobe nodule medially on image 3/35, and 5 mm right lower lobe nodule laterally on image 3/68   remains stable since 3/8/2017, therefore benign  No new nodules or consolidation  Moderate emphysematous changes again identified    There is no tracheal or endobronchial lesion      PLEURA:  Unremarkable      HEART/GREAT VESSELS:  Unremarkable for patient's age      MEDIASTINUM AND JES:  Unremarkable      CHEST WALL AND LOWER NECK:   Unremarkable      VISUALIZED STRUCTURES IN THE UPPER ABDOMEN:  Unremarkable      OSSEOUS STRUCTURES:  No acute fracture or destructive osseous lesion      IMPRESSION:     No nodules and/or definitely benign nodules  Moderate emphysema  Pulmonary function testing 8/2020:   Results:  FEV1/FVC Ratio: 30 %  Forced Vital Capacity: 2 00 L    59 % predicted  FEV1: 0 60 L     22 % predicted        After administration of bronchodilator   FVC: 2 43 L, 72 % predicted, 21 % change  FEV1: 0 66 L, 25 % predicted, 11 % change     Lung volumes by body plethysmography:   Total Lung Capacity 124 % predicted   Residual volume 243 % predicted     DLCO corrected for patients hemoglobin level: 45 %     Interpretation:     · Very severe obstructive airflow defect on spirometry     · Significant improvement in airflow or forced vital capacity in response to the administration of bronchodilator per ATS standards      · Air trapping and hyperinflation as indicated by the lung volumes     · Moderate decrease in diffusion capacity       EKG, Pathology, and Other Studies: I have personally reviewed pertinent reports  TTE 12/2019    SUMMARY:  lv in 50% range; very limitedstudy  poor penetration  no significant vaive issues  lbbb with septal bounce     LEFT VENTRICLE:  There were no regional wall motion abnormalities      VENTRICULAR SEPTUM:  There was dyssynergic motion  These changes are consistent with a conduction abnormality or paced rhythm      HISTORY: PRIOR HISTORY: Hyperlipidemia, CAD, LBBB, Cardiomyopathy, CHF, Former Smoker, COPD, GERD     PROCEDURE: The study was performed in the [address] [facility] [place]  This was a routine study  The transthoracic approach was used  The study included complete 2D imaging, M-mode, complete spectral Doppler, and color Doppler  The  heart rate was 76 bpm, at the start of the study  Images were obtained from the parasternal, apical, subcostal, and suprasternal notch acoustic windows  Echocardiographic views were limited due to lung interference   This was a technically  difficult study      LEFT VENTRICLE: Size was normal  Ejection fraction was estimated in the range of 45 % to 55 % to be 50 %  There were no regional wall motion abnormalities  DOPPLER: The study was not technically sufficient to allow evaluation of LV  diastolic function      VENTRICULAR SEPTUM: There was dyssynergic motion  These changes are consistent with a conduction abnormality or paced rhythm      RIGHT VENTRICLE: The size was normal  Systolic function was normal  Wall thickness was normal      LEFT ATRIUM: Size was normal      RIGHT ATRIUM: Size was normal      MITRAL VALVE: Not well visualized      TRICUSPID VALVE: Not well visualized      PULMONIC VALVE: Not well visualized      PERICARDIUM: There was no pericardial effusion  The pericardium was normal in appearance      AORTA: The root exhibited normal size      SYSTEMIC VEINS: IVC: Not assessed      SYSTEM MEASUREMENT TABLES       Michael Boswell MD  [facility]       Portions of the record may have been created with voice recognition software  Occasional wrong word or "sound a like" substitutions may have occurred due to the inherent limitations of voice recognition software  Read the chart carefully and recognize, using context, where substitutions have occurred

## 2021-03-15 DIAGNOSIS — E55.9 VITAMIN D DEFICIENCY: ICD-10-CM

## 2021-03-15 DIAGNOSIS — M81.6 LOCALIZED OSTEOPOROSIS WITHOUT CURRENT PATHOLOGICAL FRACTURE: ICD-10-CM

## 2021-03-15 RX ORDER — ERGOCALCIFEROL 1.25 MG/1
50000 CAPSULE ORAL
Qty: 3 CAPSULE | Refills: 1 | Status: SHIPPED | OUTPATIENT
Start: 2021-03-15 | End: 2021-08-30

## 2021-03-29 ENCOUNTER — APPOINTMENT (OUTPATIENT)
Dept: LAB | Age: 66
End: 2021-03-29
Payer: COMMERCIAL

## 2021-03-29 ENCOUNTER — LAB REQUISITION (OUTPATIENT)
Dept: LAB | Facility: HOSPITAL | Age: 66
End: 2021-03-29
Payer: COMMERCIAL

## 2021-03-29 DIAGNOSIS — N40.1 BENIGN PROSTATIC HYPERPLASIA WITH URINARY OBSTRUCTION: ICD-10-CM

## 2021-03-29 DIAGNOSIS — Z11.59 SCREENING FOR VIRAL DISEASE: ICD-10-CM

## 2021-03-29 DIAGNOSIS — R39.89 SUSPECTED UTI: ICD-10-CM

## 2021-03-29 DIAGNOSIS — C67.0 MALIGNANT NEOPLASM OF TRIGONE OF URINARY BLADDER (HCC): ICD-10-CM

## 2021-03-29 DIAGNOSIS — N13.8 OTHER OBSTRUCTIVE AND REFLUX UROPATHY: ICD-10-CM

## 2021-03-29 DIAGNOSIS — N40.1 BENIGN PROSTATIC HYPERPLASIA WITH LOWER URINARY TRACT SYMPTOMS: ICD-10-CM

## 2021-03-29 DIAGNOSIS — J44.1 CHRONIC OBSTRUCTIVE PULMONARY DISEASE WITH ACUTE EXACERBATION (HCC): ICD-10-CM

## 2021-03-29 DIAGNOSIS — Z01.818 PRE-OPERATIVE CLEARANCE: ICD-10-CM

## 2021-03-29 DIAGNOSIS — N13.8 BENIGN PROSTATIC HYPERPLASIA WITH URINARY OBSTRUCTION: ICD-10-CM

## 2021-03-29 LAB
ABO GROUP BLD: NORMAL
ANION GAP SERPL CALCULATED.3IONS-SCNC: 5 MMOL/L (ref 4–13)
BASOPHILS # BLD AUTO: 0.03 THOUSANDS/ΜL (ref 0–0.1)
BASOPHILS NFR BLD AUTO: 0 % (ref 0–1)
BLD GP AB SCN SERPL QL: NEGATIVE
BUN SERPL-MCNC: 13 MG/DL (ref 5–25)
CALCIUM SERPL-MCNC: 9.1 MG/DL (ref 8.3–10.1)
CHLORIDE SERPL-SCNC: 98 MMOL/L (ref 100–108)
CO2 SERPL-SCNC: 30 MMOL/L (ref 21–32)
CREAT SERPL-MCNC: 1.02 MG/DL (ref 0.6–1.3)
EOSINOPHIL # BLD AUTO: 0.07 THOUSAND/ΜL (ref 0–0.61)
EOSINOPHIL NFR BLD AUTO: 1 % (ref 0–6)
ERYTHROCYTE [DISTWIDTH] IN BLOOD BY AUTOMATED COUNT: 12.9 % (ref 11.6–15.1)
GFR SERPL CREATININE-BSD FRML MDRD: 77 ML/MIN/1.73SQ M
GLUCOSE P FAST SERPL-MCNC: 95 MG/DL (ref 65–99)
HCT VFR BLD AUTO: 42.1 % (ref 36.5–49.3)
HGB BLD-MCNC: 13.6 G/DL (ref 12–17)
IMM GRANULOCYTES # BLD AUTO: 0.03 THOUSAND/UL (ref 0–0.2)
IMM GRANULOCYTES NFR BLD AUTO: 0 % (ref 0–2)
LYMPHOCYTES # BLD AUTO: 0.91 THOUSANDS/ΜL (ref 0.6–4.47)
LYMPHOCYTES NFR BLD AUTO: 10 % (ref 14–44)
MCH RBC QN AUTO: 32.3 PG (ref 26.8–34.3)
MCHC RBC AUTO-ENTMCNC: 32.3 G/DL (ref 31.4–37.4)
MCV RBC AUTO: 100 FL (ref 82–98)
MONOCYTES # BLD AUTO: 0.45 THOUSAND/ΜL (ref 0.17–1.22)
MONOCYTES NFR BLD AUTO: 5 % (ref 4–12)
NEUTROPHILS # BLD AUTO: 7.59 THOUSANDS/ΜL (ref 1.85–7.62)
NEUTS SEG NFR BLD AUTO: 84 % (ref 43–75)
NRBC BLD AUTO-RTO: 0 /100 WBCS
PLATELET # BLD AUTO: 224 THOUSANDS/UL (ref 149–390)
PMV BLD AUTO: 10 FL (ref 8.9–12.7)
POTASSIUM SERPL-SCNC: 4.1 MMOL/L (ref 3.5–5.3)
RBC # BLD AUTO: 4.21 MILLION/UL (ref 3.88–5.62)
RH BLD: POSITIVE
SODIUM SERPL-SCNC: 133 MMOL/L (ref 136–145)
SPECIMEN EXPIRATION DATE: NORMAL
WBC # BLD AUTO: 9.08 THOUSAND/UL (ref 4.31–10.16)

## 2021-03-29 PROCEDURE — 86850 RBC ANTIBODY SCREEN: CPT | Performed by: UROLOGY

## 2021-03-29 PROCEDURE — 87086 URINE CULTURE/COLONY COUNT: CPT

## 2021-03-29 PROCEDURE — 85025 COMPLETE CBC W/AUTO DIFF WBC: CPT

## 2021-03-29 PROCEDURE — 36415 COLL VENOUS BLD VENIPUNCTURE: CPT

## 2021-03-29 PROCEDURE — 86900 BLOOD TYPING SEROLOGIC ABO: CPT | Performed by: UROLOGY

## 2021-03-29 PROCEDURE — 80048 BASIC METABOLIC PNL TOTAL CA: CPT

## 2021-03-29 PROCEDURE — 86901 BLOOD TYPING SEROLOGIC RH(D): CPT | Performed by: UROLOGY

## 2021-03-30 LAB — BACTERIA UR CULT: NORMAL

## 2021-04-05 ENCOUNTER — OFFICE VISIT (OUTPATIENT)
Dept: UROLOGY | Facility: AMBULATORY SURGERY CENTER | Age: 66
End: 2021-04-05
Payer: COMMERCIAL

## 2021-04-05 VITALS
HEIGHT: 62 IN | SYSTOLIC BLOOD PRESSURE: 110 MMHG | WEIGHT: 125.4 LBS | BODY MASS INDEX: 23.08 KG/M2 | DIASTOLIC BLOOD PRESSURE: 58 MMHG | HEART RATE: 92 BPM

## 2021-04-05 DIAGNOSIS — Z01.818 PREOP EXAMINATION: ICD-10-CM

## 2021-04-05 DIAGNOSIS — C67.0 MALIGNANT NEOPLASM OF TRIGONE OF URINARY BLADDER (HCC): Primary | ICD-10-CM

## 2021-04-05 LAB
BACTERIA UR QL AUTO: NORMAL /HPF
BILIRUB UR QL STRIP: NEGATIVE
CLARITY UR: CLEAR
COLOR UR: YELLOW
GLUCOSE UR STRIP-MCNC: NEGATIVE MG/DL
HGB UR QL STRIP.AUTO: NEGATIVE
HYALINE CASTS #/AREA URNS LPF: NORMAL /LPF
KETONES UR STRIP-MCNC: NEGATIVE MG/DL
LEUKOCYTE ESTERASE UR QL STRIP: NEGATIVE
NITRITE UR QL STRIP: NEGATIVE
NON-SQ EPI CELLS URNS QL MICRO: NORMAL /HPF
PH UR STRIP.AUTO: 6.5 [PH]
PROT UR STRIP-MCNC: NEGATIVE MG/DL
RBC #/AREA URNS AUTO: NORMAL /HPF
SL AMB  POCT GLUCOSE, UA: NORMAL
SL AMB LEUKOCYTE ESTERASE,UA: NORMAL
SL AMB POCT BILIRUBIN,UA: NORMAL
SL AMB POCT BLOOD,UA: NORMAL
SL AMB POCT CLARITY,UA: CLEAR
SL AMB POCT COLOR,UA: YELLOW
SL AMB POCT KETONES,UA: NORMAL
SL AMB POCT NITRITE,UA: NORMAL
SL AMB POCT PH,UA: 6
SL AMB POCT SPECIFIC GRAVITY,UA: 1.01
SL AMB POCT URINE PROTEIN: NORMAL
SL AMB POCT UROBILINOGEN: 0.2
SP GR UR STRIP.AUTO: 1.01 (ref 1–1.03)
UROBILINOGEN UR QL STRIP.AUTO: 0.2 E.U./DL
WBC #/AREA URNS AUTO: NORMAL /HPF

## 2021-04-05 PROCEDURE — 1036F TOBACCO NON-USER: CPT | Performed by: NURSE PRACTITIONER

## 2021-04-05 PROCEDURE — 81002 URINALYSIS NONAUTO W/O SCOPE: CPT | Performed by: NURSE PRACTITIONER

## 2021-04-05 PROCEDURE — 81001 URINALYSIS AUTO W/SCOPE: CPT | Performed by: NURSE PRACTITIONER

## 2021-04-05 PROCEDURE — 99214 OFFICE O/P EST MOD 30 MIN: CPT | Performed by: NURSE PRACTITIONER

## 2021-04-05 NOTE — PROGRESS NOTES
Pre-op visit  4/5/2021      Chief Complaint   Patient presents with    Follow-up     PRE-OP     Assessment and Plan     61 y o  male managed by Dr Jamia Hawk    1  Urothelial carcinoma of bladder  ·  status post cystoscopy 02/17/2021 with abnormality noted urine left ureteral orifice  ·  urinalysis in office unremarkable  ·  Urine culture and blood pending  · COVID testing pending    History and physical was performed for the patients upcoming   Transurethral resection of prostate scheduled for  04/13/2021 with Dr Jamia Hawk  All questions and concerns regarding surgery have been addressed and answered  Will proceed with surgery as planned  Preoperative clearance note from Pulmonology (Dr Katherine Hudson)  Assessment & Plan:  ·  Patient is scheduled to have transurethral prostate resection in April with general anesthesia  ·  the patient's overall considered high risk for perioperative pulmonary and respiratory complications including but not limited to vent dependent respiratory failure,  Pneumonia, atelectasis and pneumothorax  ·  to mitigate the risk for the perioperative complications recommend aggressive pulmonary toilet with bronchodilator therapy nebulizers perioperatively, use BiPAP as needed, minimize general anesthesia, DVT prophylaxis, a early ambulation, and incentive spirometer    History of Present Illness  Hilda Lorenzo  is a 61 y o  male here for history and physical prior to their upcoming  Transurethral resection of prostate for  Benign prostatic hyperplasia , he is also scheduled for fulguration of bladder mass, bladder biopsy scheduled 04/13/2021  The patient has had no overall changes in their health since their last visit and denies any prior complications with anesthesia  Patient with a history of noninvasive low-grade urothelial carcinoma of bladder status post cystoscopy 02/17/2021 with prostate enlargement with a median lobe component    Prostate is noted to invaginating into the bladder and produces near complete obstruction  He was also noted to have a small area adjacent to the left ureteral orifice postero-medially of abnormal looking tissue, likely recurrent superficial carcinoma  Pulmonary clearance as above  Review of Systems   Constitutional: Negative for chills and fever  Respiratory: Negative for cough and shortness of breath  Cardiovascular: Negative for chest pain  Gastrointestinal: Negative for abdominal distention, abdominal pain, blood in stool, nausea and vomiting  Genitourinary: Positive for difficulty urinating  Negative for dysuria, enuresis, flank pain, frequency, hematuria and urgency  Skin: Negative for rash  Past Medical History  Past Medical History:   Diagnosis Date    Bladder mass     Cardiomyopathy [facility])     Chest pain     COPD (chronic obstructive pulmonary disease) (HCC)     Emphysema of lung (HCC)     Hypoxia     nocturnal    Left bundle branch block     Multiple pulmonary nodules     last assessed: 10/12/16    Smoker        Past Social History  Past Surgical History:   Procedure Laterality Date    CARDIAC CATHETERIZATION      CYSTOSCOPY      CYSTOSCOPY  02/17/2021    WI CYSTOURETHROSCOPY,FULGUR <0 5 CM LESN N/A 1/3/2020    Procedure: TRANSURETHRAL RESECTION OF BLADDER TUMOR (TURBT);   Surgeon: Lex Vilchis MD;  [facility];  Service: Urology    WI INSTILL ANTICANCER AGENT IN BLADDER N/A 1/3/2020    Procedure: Yoon Lamas;  Surgeon: Lex Vilchis MD;  [facility];  Service: Urology       Past Family History  Family History   Problem Relation Age of Onset    Diabetes Mother        Past Social history  Social History     Socioeconomic History    Marital status: /Civil Union     Spouse name: Not on file    Number of children: Not on file    Years of education: Not on file    Highest education level: Not on file   Occupational History    Not on file   Social Needs    Financial resource strain: Not on file    Food insecurity     Worry: Not on file     Inability: Not on file    Transportation needs     Medical: Not on file     Non-medical: Not on file   Tobacco Use    Smoking status: Former Smoker     Packs/day: 2 50     Years: 37 00     Pack years: 92 50     Types: Cigarettes     Start date: 5     Quit date: 2012     Years since quittin 2    Smokeless tobacco: Former User    Tobacco comment: 1 ppd for 37 years, 2010 down to 5 cigs a day, is around second hand smoke   Substance and Sexual Activity    Alcohol use: Not Currently     Comment: rarely    Drug use: No    Sexual activity: Not on file   Lifestyle    Physical activity     Days per week: Not on file     Minutes per session: Not on file    Stress: Not on file   Relationships    Social connections     Talks on phone: Not on file     Gets together: Not on file     Attends [employer] service: Not on file     Active member of club or organization: Not on file     Attends meetings of clubs or organizations: Not on file     Relationship status: Not on file    Intimate partner violence     Fear of current or ex partner: Not on file     Emotionally abused: Not on file     Physically abused: Not on file     Forced sexual activity: Not on file   Other Topics Concern    Not on file   Social History Narrative    Daily coffee consumption: 8 or more cups a day           Current Medications  Current Outpatient Medications   Medication Sig Dispense Refill    albuterol (2 5 mg/3 mL) 0 083 % nebulizer solution Take 1 vial (2 5 mg total) by nebulization 4 (four) times a day 360 mL 5    albuterol (Ventolin HFA) 90 mcg/act inhaler Inhale 2 puffs every 6 (six) hours as needed for wheezing 1 Inhaler 11    alendronate (FOSAMAX) 70 mg tablet TAKE 1 TABLET BY MOUTH ONE TIME PER WEEK 12 tablet 1    aspirin 81 MG tablet Take 1 tablet by mouth daily      ergocalciferol (VITAMIN D2) 50,000 units TAKE 1 CAPSULE (50,000 UNITS TOTAL) BY MOUTH EVERY 28 DAYS 3 capsule 1    fluticasone-umeclidinium-vilanterol (TRELEGY ELLIPTA) 100-62 5-25 MCG/INH inhaler Inhale 1 puff daily Rinse mouth after use  1 Inhaler 11    guaiFENesin (ROBITUSSIN) 100 MG/5ML oral liquid Take 200 mg by mouth 3 (three) times a day as needed for cough      ipratropium (ATROVENT) 0 02 % nebulizer solution Take 1 vial (0 5 mg total) by nebulization 4 (four) times a day 120 vial 6    pantoprazole (PROTONIX) 40 mg tablet Take by mouth Daily      predniSONE 10 mg tablet Take 2 tablets by mouth daily      predniSONE 10 mg tablet Take 1 tablet (10 mg total) by mouth daily 90 tablet 3    predniSONE 20 mg tablet Take 2 tablets (40 mg total) by mouth daily 10 tablet 0    rosuvastatin (CRESTOR) 10 MG tablet TAKE 1 TABLET BY MOUTH EVERY DAY 90 tablet 1    hydrocodone-chlorpheniramine polistirex (TUSSIONEX) 10-8 mg/5 mL ER suspension Take 5 mL by mouth every 12 (twelve) hours as needed for coughMax Daily Amount: 10 mL (Patient not taking: Reported on 4/5/2021) 120 mL 0     No current facility-administered medications for this visit  Allergies  No Known Allergies      Past Medical History, Social History, Family History, medications and allergies were reviewed        Vitals  Vitals:    04/05/21 1117   BP: 110/58   BP Location: Left arm   Patient Position: Sitting   Cuff Size: Adult   Pulse: 92   Weight: 56 9 kg (125 lb 6 4 oz)   Height: 5' 2" (1 575 m)       Physical Exam  /58 (BP Location: Left arm, Patient Position: Sitting, Cuff Size: Adult)   Pulse 92   Ht 5' 2" (1 575 m)   Wt 56 9 kg (125 lb 6 4 oz)   BMI 22 94 kg/m²   General appearance: alert and oriented, in no acute distress  Head: Normocephalic, without obvious abnormality, atraumatic  Lungs:   Wheezing auscultated throughout all posterior field  Heart: regular rate and rhythm, S1, S2 normal, no murmur, click, rub or gallop  Abdomen:  soft, nontender  Extremities:  no edema noted  Skin:  warm, dry and intact  Neurologic: Grossly normal      DELIA Feliciano

## 2021-04-05 NOTE — H&P
Pre-op visit  4/5/2021      Chief Complaint   Patient presents with    Follow-up     PRE-OP     Assessment and Plan     61 y o  male managed by Dr Saniya Bynum    1  Urothelial carcinoma of bladder  ·  status post cystoscopy 02/17/2021 with abnormality noted urine left ureteral orifice  ·  urinalysis in office unremarkable  ·  Urine culture and blood pending  · COVID testing pending    History and physical was performed for the patients upcoming   Transurethral resection of prostate scheduled for  04/13/2021 with Dr Saniya Bynum  All questions and concerns regarding surgery have been addressed and answered  Will proceed with surgery as planned  Preoperative clearance note from Pulmonology (Dr Dayna Farmer)  Assessment & Plan:  ·  Patient is scheduled to have transurethral prostate resection in April with general anesthesia  ·  the patient's overall considered high risk for perioperative pulmonary and respiratory complications including but not limited to vent dependent respiratory failure,  Pneumonia, atelectasis and pneumothorax  ·  to mitigate the risk for the perioperative complications recommend aggressive pulmonary toilet with bronchodilator therapy nebulizers perioperatively, use BiPAP as needed, minimize general anesthesia, DVT prophylaxis, a early ambulation, and incentive spirometer    History of Present Illness  River Beltran  is a 61 y o  male here for history and physical prior to their upcoming  Transurethral resection of prostate for  Benign prostatic hyperplasia , he is also scheduled for fulguration of bladder mass, bladder biopsy scheduled 04/13/2021  The patient has had no overall changes in their health since their last visit and denies any prior complications with anesthesia  Patient with a history of noninvasive low-grade urothelial carcinoma of bladder status post cystoscopy 02/17/2021 with prostate enlargement with a median lobe component    Prostate is noted to invaginating into the bladder and produces near complete obstruction  He was also noted to have a small area adjacent to the left ureteral orifice postero-medially of abnormal looking tissue, likely recurrent superficial carcinoma  Pulmonary clearance as above  Review of Systems   Constitutional: Negative for chills and fever  Respiratory: Negative for cough and shortness of breath  Cardiovascular: Negative for chest pain  Gastrointestinal: Negative for abdominal distention, abdominal pain, blood in stool, nausea and vomiting  Genitourinary: Positive for difficulty urinating  Negative for dysuria, enuresis, flank pain, frequency, hematuria and urgency  Skin: Negative for rash  Past Medical History  Past Medical History:   Diagnosis Date    Bladder mass     Cardiomyopathy [facility])     Chest pain     COPD (chronic obstructive pulmonary disease) (HCC)     Emphysema of lung (HCC)     Hypoxia     nocturnal    Left bundle branch block     Multiple pulmonary nodules     last assessed: 10/12/16    Smoker        Past Social History  Past Surgical History:   Procedure Laterality Date    CARDIAC CATHETERIZATION      CYSTOSCOPY      CYSTOSCOPY  02/17/2021    TX CYSTOURETHROSCOPY,FULGUR <0 5 CM LESN N/A 1/3/2020    Procedure: TRANSURETHRAL RESECTION OF BLADDER TUMOR (TURBT);   Surgeon: Tonie Pedraza MD;  [facility];  Service: Urology    TX INSTILL ANTICANCER AGENT IN BLADDER N/A 1/3/2020    Procedure: Niya Yeboah;  Surgeon: Tonie Pedraza MD;  [facility];  Service: Urology       Past Family History  Family History   Problem Relation Age of Onset    Diabetes Mother        Past Social history  Social History     Socioeconomic History    Marital status: /Civil Union     Spouse name: Not on file    Number of children: Not on file    Years of education: Not on file    Highest education level: Not on file   Occupational History    Not on file   Social Needs    Financial resource strain: Not on file    Food insecurity     Worry: Not on file     Inability: Not on file    Transportation needs     Medical: Not on file     Non-medical: Not on file   Tobacco Use    Smoking status: Former Smoker     Packs/day: 2 50     Years: 37 00     Pack years: 92 50     Types: Cigarettes     Start date: 5     Quit date: 2012     Years since quittin 2    Smokeless tobacco: Former User    Tobacco comment: 1 ppd for 37 years, 2010 down to 5 cigs a day, is around second hand smoke   Substance and Sexual Activity    Alcohol use: Not Currently     Comment: rarely    Drug use: No    Sexual activity: Not on file   Lifestyle    Physical activity     Days per week: Not on file     Minutes per session: Not on file    Stress: Not on file   Relationships    Social connections     Talks on phone: Not on file     Gets together: Not on file     Attends [employer] service: Not on file     Active member of club or organization: Not on file     Attends meetings of clubs or organizations: Not on file     Relationship status: Not on file    Intimate partner violence     Fear of current or ex partner: Not on file     Emotionally abused: Not on file     Physically abused: Not on file     Forced sexual activity: Not on file   Other Topics Concern    Not on file   Social History Narrative    Daily coffee consumption: 8 or more cups a day           Current Medications  Current Outpatient Medications   Medication Sig Dispense Refill    albuterol (2 5 mg/3 mL) 0 083 % nebulizer solution Take 1 vial (2 5 mg total) by nebulization 4 (four) times a day 360 mL 5    albuterol (Ventolin HFA) 90 mcg/act inhaler Inhale 2 puffs every 6 (six) hours as needed for wheezing 1 Inhaler 11    alendronate (FOSAMAX) 70 mg tablet TAKE 1 TABLET BY MOUTH ONE TIME PER WEEK 12 tablet 1    aspirin 81 MG tablet Take 1 tablet by mouth daily      ergocalciferol (VITAMIN D2) 50,000 units TAKE 1 CAPSULE (50,000 UNITS TOTAL) BY MOUTH EVERY 28 DAYS 3 capsule 1    fluticasone-umeclidinium-vilanterol (TRELEGY ELLIPTA) 100-62 5-25 MCG/INH inhaler Inhale 1 puff daily Rinse mouth after use  1 Inhaler 11    guaiFENesin (ROBITUSSIN) 100 MG/5ML oral liquid Take 200 mg by mouth 3 (three) times a day as needed for cough      ipratropium (ATROVENT) 0 02 % nebulizer solution Take 1 vial (0 5 mg total) by nebulization 4 (four) times a day 120 vial 6    pantoprazole (PROTONIX) 40 mg tablet Take by mouth Daily      predniSONE 10 mg tablet Take 2 tablets by mouth daily      predniSONE 10 mg tablet Take 1 tablet (10 mg total) by mouth daily 90 tablet 3    predniSONE 20 mg tablet Take 2 tablets (40 mg total) by mouth daily 10 tablet 0    rosuvastatin (CRESTOR) 10 MG tablet TAKE 1 TABLET BY MOUTH EVERY DAY 90 tablet 1    hydrocodone-chlorpheniramine polistirex (TUSSIONEX) 10-8 mg/5 mL ER suspension Take 5 mL by mouth every 12 (twelve) hours as needed for coughMax Daily Amount: 10 mL (Patient not taking: Reported on 4/5/2021) 120 mL 0     No current facility-administered medications for this visit  Allergies  No Known Allergies      Past Medical History, Social History, Family History, medications and allergies were reviewed        Vitals  Vitals:    04/05/21 1117   BP: 110/58   BP Location: Left arm   Patient Position: Sitting   Cuff Size: Adult   Pulse: 92   Weight: 56 9 kg (125 lb 6 4 oz)   Height: 5' 2" (1 575 m)       Physical Exam  /58 (BP Location: Left arm, Patient Position: Sitting, Cuff Size: Adult)   Pulse 92   Ht 5' 2" (1 575 m)   Wt 56 9 kg (125 lb 6 4 oz)   BMI 22 94 kg/m²   General appearance: alert and oriented, in no acute distress  Head: Normocephalic, without obvious abnormality, atraumatic  Lungs:   Wheezing auscultated throughout all posterior field  Heart: regular rate and rhythm, S1, S2 normal, no murmur, click, rub or gallop  Abdomen:  soft, nontender  Extremities:  no edema noted  Skin:  warm, dry and intact  Neurologic: Grossly normal      Balinda Holstein, CRNP

## 2021-04-05 NOTE — H&P (VIEW-ONLY)
Pre-op visit  4/5/2021      Chief Complaint   Patient presents with    Follow-up     PRE-OP     Assessment and Plan     61 y o  male managed by Dr Zaida Singer    1  Urothelial carcinoma of bladder  ·  status post cystoscopy 02/17/2021 with abnormality noted urine left ureteral orifice  ·  urinalysis in office unremarkable  ·  Urine culture and blood pending  · COVID testing pending    History and physical was performed for the patients upcoming   Transurethral resection of prostate scheduled for  04/13/2021 with Dr Zaida Singer  All questions and concerns regarding surgery have been addressed and answered  Will proceed with surgery as planned  Preoperative clearance note from Pulmonology (Dr Edgard Pinto)  Assessment & Plan:  ·  Patient is scheduled to have transurethral prostate resection in April with general anesthesia  ·  the patient's overall considered high risk for perioperative pulmonary and respiratory complications including but not limited to vent dependent respiratory failure,  Pneumonia, atelectasis and pneumothorax  ·  to mitigate the risk for the perioperative complications recommend aggressive pulmonary toilet with bronchodilator therapy nebulizers perioperatively, use BiPAP as needed, minimize general anesthesia, DVT prophylaxis, a early ambulation, and incentive spirometer    History of Present Illness  Maylin Ramachandran  is a 61 y o  male here for history and physical prior to their upcoming  Transurethral resection of prostate for  Benign prostatic hyperplasia , he is also scheduled for fulguration of bladder mass, bladder biopsy scheduled 04/13/2021  The patient has had no overall changes in their health since their last visit and denies any prior complications with anesthesia  Patient with a history of noninvasive low-grade urothelial carcinoma of bladder status post cystoscopy 02/17/2021 with prostate enlargement with a median lobe component    Prostate is noted to invaginating into the bladder and produces near complete obstruction  He was also noted to have a small area adjacent to the left ureteral orifice postero-medially of abnormal looking tissue, likely recurrent superficial carcinoma  Pulmonary clearance as above  Review of Systems   Constitutional: Negative for chills and fever  Respiratory: Negative for cough and shortness of breath  Cardiovascular: Negative for chest pain  Gastrointestinal: Negative for abdominal distention, abdominal pain, blood in stool, nausea and vomiting  Genitourinary: Positive for difficulty urinating  Negative for dysuria, enuresis, flank pain, frequency, hematuria and urgency  Skin: Negative for rash  Past Medical History  Past Medical History:   Diagnosis Date    Bladder mass     Cardiomyopathy [facility])     Chest pain     COPD (chronic obstructive pulmonary disease) (HCC)     Emphysema of lung (HCC)     Hypoxia     nocturnal    Left bundle branch block     Multiple pulmonary nodules     last assessed: 10/12/16    Smoker        Past Social History  Past Surgical History:   Procedure Laterality Date    CARDIAC CATHETERIZATION      CYSTOSCOPY      CYSTOSCOPY  02/17/2021    ID CYSTOURETHROSCOPY,FULGUR <0 5 CM LESN N/A 1/3/2020    Procedure: TRANSURETHRAL RESECTION OF BLADDER TUMOR (TURBT);   Surgeon: Malinda Boston MD;  [facility];  Service: Urology    ID INSTILL ANTICANCER AGENT IN BLADDER N/A 1/3/2020    Procedure: Christine Schaller;  Surgeon: Malinda Boston MD;  [facility];  Service: Urology       Past Family History  Family History   Problem Relation Age of Onset    Diabetes Mother        Past Social history  Social History     Socioeconomic History    Marital status: /Civil Union     Spouse name: Not on file    Number of children: Not on file    Years of education: Not on file    Highest education level: Not on file   Occupational History    Not on file   Social Needs    Financial resource strain: Not on file    Food insecurity     Worry: Not on file     Inability: Not on file    Transportation needs     Medical: Not on file     Non-medical: Not on file   Tobacco Use    Smoking status: Former Smoker     Packs/day: 2 50     Years: 37 00     Pack years: 92 50     Types: Cigarettes     Start date: 5     Quit date: 2012     Years since quittin 2    Smokeless tobacco: Former User    Tobacco comment: 1 ppd for 37 years, 2010 down to 5 cigs a day, is around second hand smoke   Substance and Sexual Activity    Alcohol use: Not Currently     Comment: rarely    Drug use: No    Sexual activity: Not on file   Lifestyle    Physical activity     Days per week: Not on file     Minutes per session: Not on file    Stress: Not on file   Relationships    Social connections     Talks on phone: Not on file     Gets together: Not on file     Attends [employer] service: Not on file     Active member of club or organization: Not on file     Attends meetings of clubs or organizations: Not on file     Relationship status: Not on file    Intimate partner violence     Fear of current or ex partner: Not on file     Emotionally abused: Not on file     Physically abused: Not on file     Forced sexual activity: Not on file   Other Topics Concern    Not on file   Social History Narrative    Daily coffee consumption: 8 or more cups a day           Current Medications  Current Outpatient Medications   Medication Sig Dispense Refill    albuterol (2 5 mg/3 mL) 0 083 % nebulizer solution Take 1 vial (2 5 mg total) by nebulization 4 (four) times a day 360 mL 5    albuterol (Ventolin HFA) 90 mcg/act inhaler Inhale 2 puffs every 6 (six) hours as needed for wheezing 1 Inhaler 11    alendronate (FOSAMAX) 70 mg tablet TAKE 1 TABLET BY MOUTH ONE TIME PER WEEK 12 tablet 1    aspirin 81 MG tablet Take 1 tablet by mouth daily      ergocalciferol (VITAMIN D2) 50,000 units TAKE 1 CAPSULE (50,000 UNITS TOTAL) BY MOUTH EVERY 28 DAYS 3 capsule 1    fluticasone-umeclidinium-vilanterol (TRELEGY ELLIPTA) 100-62 5-25 MCG/INH inhaler Inhale 1 puff daily Rinse mouth after use  1 Inhaler 11    guaiFENesin (ROBITUSSIN) 100 MG/5ML oral liquid Take 200 mg by mouth 3 (three) times a day as needed for cough      ipratropium (ATROVENT) 0 02 % nebulizer solution Take 1 vial (0 5 mg total) by nebulization 4 (four) times a day 120 vial 6    pantoprazole (PROTONIX) 40 mg tablet Take by mouth Daily      predniSONE 10 mg tablet Take 2 tablets by mouth daily      predniSONE 10 mg tablet Take 1 tablet (10 mg total) by mouth daily 90 tablet 3    predniSONE 20 mg tablet Take 2 tablets (40 mg total) by mouth daily 10 tablet 0    rosuvastatin (CRESTOR) 10 MG tablet TAKE 1 TABLET BY MOUTH EVERY DAY 90 tablet 1    hydrocodone-chlorpheniramine polistirex (TUSSIONEX) 10-8 mg/5 mL ER suspension Take 5 mL by mouth every 12 (twelve) hours as needed for coughMax Daily Amount: 10 mL (Patient not taking: Reported on 4/5/2021) 120 mL 0     No current facility-administered medications for this visit  Allergies  No Known Allergies      Past Medical History, Social History, Family History, medications and allergies were reviewed        Vitals  Vitals:    04/05/21 1117   BP: 110/58   BP Location: Left arm   Patient Position: Sitting   Cuff Size: Adult   Pulse: 92   Weight: 56 9 kg (125 lb 6 4 oz)   Height: 5' 2" (1 575 m)       Physical Exam  /58 (BP Location: Left arm, Patient Position: Sitting, Cuff Size: Adult)   Pulse 92   Ht 5' 2" (1 575 m)   Wt 56 9 kg (125 lb 6 4 oz)   BMI 22 94 kg/m²   General appearance: alert and oriented, in no acute distress  Head: Normocephalic, without obvious abnormality, atraumatic  Lungs:   Wheezing auscultated throughout all posterior field  Heart: regular rate and rhythm, S1, S2 normal, no murmur, click, rub or gallop  Abdomen:  soft, nontender  Extremities:  no edema noted  Skin:  warm, dry and intact  Neurologic: Grossly normal      Kobe Subha, ALNP

## 2021-04-06 ENCOUNTER — APPOINTMENT (OUTPATIENT)
Dept: LAB | Facility: HOSPITAL | Age: 66
End: 2021-04-06
Payer: COMMERCIAL

## 2021-04-06 ENCOUNTER — TELEPHONE (OUTPATIENT)
Dept: URGENT CARE | Facility: CLINIC | Age: 66
End: 2021-04-06

## 2021-04-06 LAB — SARS-COV-2 RNA RESP QL NAA+PROBE: NEGATIVE

## 2021-04-06 PROCEDURE — U0003 INFECTIOUS AGENT DETECTION BY NUCLEIC ACID (DNA OR RNA); SEVERE ACUTE RESPIRATORY SYNDROME CORONAVIRUS 2 (SARS-COV-2) (CORONAVIRUS DISEASE [COVID-19]), AMPLIFIED PROBE TECHNIQUE, MAKING USE OF HIGH THROUGHPUT TECHNOLOGIES AS DESCRIBED BY CMS-2020-01-R: HCPCS

## 2021-04-06 PROCEDURE — U0005 INFEC AGEN DETEC AMPLI PROBE: HCPCS

## 2021-04-07 ENCOUNTER — ANESTHESIA EVENT (OUTPATIENT)
Dept: PERIOP | Facility: HOSPITAL | Age: 66
End: 2021-04-07
Payer: COMMERCIAL

## 2021-04-09 NOTE — PRE-PROCEDURE INSTRUCTIONS
Pre-Surgery Instructions:   Medication Instructions    albuterol (2 5 mg/3 mL) 0 083 % nebulizer solution use morning day of surgery    albuterol (Ventolin HFA) 90 mcg/act inhaler PRN    alendronate (FOSAMAX) 70 mg tablet don't take morning day of surgery    aspirin 81 MG tablet Patient was instructed by Physician and understands   ergocalciferol (VITAMIN D2) 50,000 units stop pre op    fluticasone-umeclidinium-vilanterol (TRELEGY ELLIPTA) 100-62 5-25 MCG/INH inhaler use morning day of surgery    guaiFENesin (ROBITUSSIN) 100 MG/5ML oral liquid PRN    hydrocodone-chlorpheniramine polistirex (TUSSIONEX) 10-8 mg/5 mL ER suspension Patient was instructed to contact Physician for medication instruction   ipratropium (ATROVENT) 0 02 % nebulizer solution use morning day of surgery    pantoprazole (PROTONIX) 40 mg tablet take morning day of surgery with sips of water    predniSONE 10 mg tablet take morning day of surgery with sips of water    rosuvastatin (CRESTOR) 10 MG tablet take morning day of surgery with sips of water   Have you had / have a sore throat?  have you had / have a cough less than 1 week? Have you had / have a fever greater than 100 0 - 100  4? Are you experiencing any shortness of breath? Review with patient via phone medications and showering instructions  Advised don't take NSAID's ok tylenol products  Advised ASC call with surgery schedule time, nothing eat or drink after midnight except meds with sips of water  Verbalized understanding  ASA Med Class: Aspirin  Should be discontinued at least one week prior to planned operation, unless specifically stated otherwise by surgical service  Your Surgeon may have patient stop taking aspirin up to a week before surgery if having intracranial, middle ear, posterior eye, spine surgery or prostate surgery  [Patients taking aspirin for coronary stents should be reviewed by an anesthesiologist in the optimization clinic    Please do not discontinue aspirin in patients with coronary stents unless given specific permission to do so by the cardiologist who prescribed medication ]   If your surgeon approves please continue to take this medication on your normal schedule  You may take this medication on the morning of your surgery with a sip of water  Inhalational Med Class  Continue to take these inhaler medications on your normal schedule up to and including the day of surgery  Statin Med Class  Continue to take this medication on your normal schedule  If this is an oral medication and you take it in the morning, then you may take this medicine with a sip of water  Steroids Med Class  Continue to take this medication on your normal schedule  If this is an oral medication and you take it in the morning, then you may take this medicine with a sip of water  Vitamin Med Class  You may continue to take any vitamin that your surgeon has prescribed to you up to the day before surgery  If your surgeon has not specifically prescribed this vitamin or instructed you to continue then stop taking 7 days prior to surgery

## 2021-04-13 ENCOUNTER — HOSPITAL ENCOUNTER (OUTPATIENT)
Facility: HOSPITAL | Age: 66
Setting detail: OUTPATIENT SURGERY
Discharge: HOME | End: 2021-04-14
Attending: UROLOGY | Admitting: UROLOGY
Payer: COMMERCIAL

## 2021-04-13 ENCOUNTER — ANESTHESIA (OUTPATIENT)
Dept: PERIOP | Facility: HOSPITAL | Age: 66
End: 2021-04-13
Payer: COMMERCIAL

## 2021-04-13 DIAGNOSIS — N40.1 BENIGN PROSTATIC HYPERPLASIA WITH URINARY OBSTRUCTION: ICD-10-CM

## 2021-04-13 DIAGNOSIS — N40.1 BPH WITH OBSTRUCTION/LOWER URINARY TRACT SYMPTOMS: Primary | ICD-10-CM

## 2021-04-13 DIAGNOSIS — N13.8 BENIGN PROSTATIC HYPERPLASIA WITH URINARY OBSTRUCTION: ICD-10-CM

## 2021-04-13 DIAGNOSIS — N13.8 BPH WITH OBSTRUCTION/LOWER URINARY TRACT SYMPTOMS: Primary | ICD-10-CM

## 2021-04-13 LAB
ABO GROUP BLD: NORMAL
ANION GAP SERPL CALCULATED.3IONS-SCNC: 1 MMOL/L (ref 4–13)
BUN SERPL-MCNC: 14 MG/DL (ref 5–25)
CALCIUM SERPL-MCNC: 8.8 MG/DL (ref 8.3–10.1)
CHLORIDE SERPL-SCNC: 100 MMOL/L (ref 100–108)
CO2 SERPL-SCNC: 33 MMOL/L (ref 21–32)
CREAT SERPL-MCNC: 1.03 MG/DL (ref 0.6–1.3)
ERYTHROCYTE [DISTWIDTH] IN BLOOD BY AUTOMATED COUNT: 13.1 % (ref 11.6–15.1)
GFR SERPL CREATININE-BSD FRML MDRD: 76 ML/MIN/1.73SQ M
GLUCOSE P FAST SERPL-MCNC: 84 MG/DL (ref 65–99)
GLUCOSE SERPL-MCNC: 84 MG/DL (ref 65–140)
HCT VFR BLD AUTO: 39.7 % (ref 36.5–49.3)
HGB BLD-MCNC: 12.7 G/DL (ref 12–17)
MCH RBC QN AUTO: 31.8 PG (ref 26.8–34.3)
MCHC RBC AUTO-ENTMCNC: 32 G/DL (ref 31.4–37.4)
MCV RBC AUTO: 99 FL (ref 82–98)
PLATELET # BLD AUTO: 206 THOUSANDS/UL (ref 149–390)
PMV BLD AUTO: 9 FL (ref 8.9–12.7)
POTASSIUM SERPL-SCNC: 4.9 MMOL/L (ref 3.5–5.3)
RBC # BLD AUTO: 4 MILLION/UL (ref 3.88–5.62)
RH BLD: POSITIVE
SODIUM SERPL-SCNC: 134 MMOL/L (ref 136–145)
WBC # BLD AUTO: 6.89 THOUSAND/UL (ref 4.31–10.16)

## 2021-04-13 PROCEDURE — 88342 IMHCHEM/IMCYTCHM 1ST ANTB: CPT | Performed by: PATHOLOGY

## 2021-04-13 PROCEDURE — 52204 CYSTOSCOPY W/BIOPSY(S): CPT | Performed by: UROLOGY

## 2021-04-13 PROCEDURE — 94664 DEMO&/EVAL PT USE INHALER: CPT

## 2021-04-13 PROCEDURE — 52601 PROSTATECTOMY (TURP): CPT | Performed by: UROLOGY

## 2021-04-13 PROCEDURE — 94760 N-INVAS EAR/PLS OXIMETRY 1: CPT

## 2021-04-13 PROCEDURE — 88305 TISSUE EXAM BY PATHOLOGIST: CPT | Performed by: PATHOLOGY

## 2021-04-13 PROCEDURE — 88344 IMHCHEM/IMCYTCHM EA MLT ANTB: CPT | Performed by: PATHOLOGY

## 2021-04-13 PROCEDURE — 80048 BASIC METABOLIC PNL TOTAL CA: CPT | Performed by: UROLOGY

## 2021-04-13 PROCEDURE — 85027 COMPLETE CBC AUTOMATED: CPT | Performed by: UROLOGY

## 2021-04-13 PROCEDURE — 94640 AIRWAY INHALATION TREATMENT: CPT

## 2021-04-13 RX ORDER — MAGNESIUM HYDROXIDE 1200 MG/15ML
LIQUID ORAL AS NEEDED
Status: DISCONTINUED | OUTPATIENT
Start: 2021-04-13 | End: 2021-04-13 | Stop reason: HOSPADM

## 2021-04-13 RX ORDER — PANTOPRAZOLE SODIUM 40 MG/1
40 TABLET, DELAYED RELEASE ORAL
Status: DISCONTINUED | OUTPATIENT
Start: 2021-04-14 | End: 2021-04-14 | Stop reason: HOSPADM

## 2021-04-13 RX ORDER — HYDROMORPHONE HCL/PF 1 MG/ML
0.5 SYRINGE (ML) INJECTION EVERY 2 HOUR PRN
Status: DISCONTINUED | OUTPATIENT
Start: 2021-04-13 | End: 2021-04-14 | Stop reason: HOSPADM

## 2021-04-13 RX ORDER — ONDANSETRON 2 MG/ML
4 INJECTION INTRAMUSCULAR; INTRAVENOUS EVERY 6 HOURS PRN
Status: DISCONTINUED | OUTPATIENT
Start: 2021-04-13 | End: 2021-04-14 | Stop reason: HOSPADM

## 2021-04-13 RX ORDER — ALBUTEROL SULFATE 90 UG/1
2 AEROSOL, METERED RESPIRATORY (INHALATION) EVERY 4 HOURS PRN
Status: DISCONTINUED | OUTPATIENT
Start: 2021-04-13 | End: 2021-04-13 | Stop reason: HOSPADM

## 2021-04-13 RX ORDER — PREDNISONE 20 MG/1
20 TABLET ORAL DAILY
Status: DISCONTINUED | OUTPATIENT
Start: 2021-04-13 | End: 2021-04-14 | Stop reason: HOSPADM

## 2021-04-13 RX ORDER — ALBUTEROL SULFATE 2.5 MG/3ML
2.5 SOLUTION RESPIRATORY (INHALATION) 4 TIMES DAILY
Status: DISCONTINUED | OUTPATIENT
Start: 2021-04-13 | End: 2021-04-13

## 2021-04-13 RX ORDER — MAGNESIUM HYDROXIDE 1200 MG/15ML
3000 LIQUID ORAL CONTINUOUS
Status: DISCONTINUED | OUTPATIENT
Start: 2021-04-13 | End: 2021-04-14 | Stop reason: HOSPADM

## 2021-04-13 RX ORDER — LEVALBUTEROL 1.25 MG/.5ML
1.25 SOLUTION, CONCENTRATE RESPIRATORY (INHALATION)
Status: DISCONTINUED | OUTPATIENT
Start: 2021-04-13 | End: 2021-04-14 | Stop reason: HOSPADM

## 2021-04-13 RX ORDER — HEPARIN SODIUM 5000 [USP'U]/ML
5000 INJECTION, SOLUTION INTRAVENOUS; SUBCUTANEOUS EVERY 12 HOURS SCHEDULED
Status: DISCONTINUED | OUTPATIENT
Start: 2021-04-13 | End: 2021-04-14 | Stop reason: HOSPADM

## 2021-04-13 RX ORDER — OXYBUTYNIN CHLORIDE 5 MG/1
5 TABLET ORAL 2 TIMES DAILY
Status: DISCONTINUED | OUTPATIENT
Start: 2021-04-13 | End: 2021-04-14 | Stop reason: HOSPADM

## 2021-04-13 RX ORDER — ONDANSETRON 2 MG/ML
INJECTION INTRAMUSCULAR; INTRAVENOUS AS NEEDED
Status: DISCONTINUED | OUTPATIENT
Start: 2021-04-13 | End: 2021-04-13

## 2021-04-13 RX ORDER — ALBUTEROL SULFATE 90 UG/1
2 AEROSOL, METERED RESPIRATORY (INHALATION) EVERY 6 HOURS PRN
Status: DISCONTINUED | OUTPATIENT
Start: 2021-04-13 | End: 2021-04-14 | Stop reason: HOSPADM

## 2021-04-13 RX ORDER — PROPOFOL 10 MG/ML
INJECTION, EMULSION INTRAVENOUS AS NEEDED
Status: DISCONTINUED | OUTPATIENT
Start: 2021-04-13 | End: 2021-04-13

## 2021-04-13 RX ORDER — CEFAZOLIN SODIUM 1 G/50ML
1000 SOLUTION INTRAVENOUS ONCE
Status: DISCONTINUED | OUTPATIENT
Start: 2021-04-13 | End: 2021-04-13 | Stop reason: HOSPADM

## 2021-04-13 RX ORDER — CALCIUM CARBONATE 200(500)MG
1000 TABLET,CHEWABLE ORAL DAILY PRN
Status: DISCONTINUED | OUTPATIENT
Start: 2021-04-13 | End: 2021-04-14 | Stop reason: HOSPADM

## 2021-04-13 RX ORDER — BACITRACIN, NEOMYCIN, POLYMYXIN B 400; 3.5; 5 [USP'U]/G; MG/G; [USP'U]/G
1 OINTMENT TOPICAL 2 TIMES DAILY
Status: DISCONTINUED | OUTPATIENT
Start: 2021-04-13 | End: 2021-04-14 | Stop reason: HOSPADM

## 2021-04-13 RX ORDER — DOCUSATE SODIUM 100 MG/1
100 CAPSULE, LIQUID FILLED ORAL 2 TIMES DAILY
Status: DISCONTINUED | OUTPATIENT
Start: 2021-04-13 | End: 2021-04-14 | Stop reason: HOSPADM

## 2021-04-13 RX ORDER — LIDOCAINE HYDROCHLORIDE 10 MG/ML
0.5 INJECTION, SOLUTION EPIDURAL; INFILTRATION; INTRACAUDAL; PERINEURAL ONCE AS NEEDED
Status: COMPLETED | OUTPATIENT
Start: 2021-04-13 | End: 2021-04-13

## 2021-04-13 RX ORDER — FINASTERIDE 5 MG/1
5 TABLET, FILM COATED ORAL DAILY
Status: DISCONTINUED | OUTPATIENT
Start: 2021-04-13 | End: 2021-04-14 | Stop reason: HOSPADM

## 2021-04-13 RX ORDER — MIDAZOLAM HYDROCHLORIDE 2 MG/2ML
INJECTION, SOLUTION INTRAMUSCULAR; INTRAVENOUS AS NEEDED
Status: DISCONTINUED | OUTPATIENT
Start: 2021-04-13 | End: 2021-04-13

## 2021-04-13 RX ORDER — ATROPA BELLADONNA AND OPIUM 16.2; 3 MG/1; MG/1
30 SUPPOSITORY RECTAL EVERY 8 HOURS PRN
Status: DISCONTINUED | OUTPATIENT
Start: 2021-04-13 | End: 2021-04-14 | Stop reason: HOSPADM

## 2021-04-13 RX ORDER — ONDANSETRON 2 MG/ML
4 INJECTION INTRAMUSCULAR; INTRAVENOUS ONCE AS NEEDED
Status: DISCONTINUED | OUTPATIENT
Start: 2021-04-13 | End: 2021-04-13 | Stop reason: HOSPADM

## 2021-04-13 RX ORDER — FENTANYL CITRATE/PF 50 MCG/ML
50 SYRINGE (ML) INJECTION
Status: DISCONTINUED | OUTPATIENT
Start: 2021-04-13 | End: 2021-04-13 | Stop reason: HOSPADM

## 2021-04-13 RX ORDER — SODIUM CHLORIDE, SODIUM LACTATE, POTASSIUM CHLORIDE, CALCIUM CHLORIDE 600; 310; 30; 20 MG/100ML; MG/100ML; MG/100ML; MG/100ML
50 INJECTION, SOLUTION INTRAVENOUS CONTINUOUS
Status: DISCONTINUED | OUTPATIENT
Start: 2021-04-13 | End: 2021-04-13

## 2021-04-13 RX ORDER — HYDROCODONE BITARTRATE AND ACETAMINOPHEN 5; 325 MG/1; MG/1
1 TABLET ORAL EVERY 4 HOURS PRN
Status: DISCONTINUED | OUTPATIENT
Start: 2021-04-13 | End: 2021-04-14 | Stop reason: HOSPADM

## 2021-04-13 RX ORDER — GLYCINE 1.5 G/100ML
SOLUTION IRRIGATION AS NEEDED
Status: DISCONTINUED | OUTPATIENT
Start: 2021-04-13 | End: 2021-04-13 | Stop reason: HOSPADM

## 2021-04-13 RX ORDER — CEFAZOLIN SODIUM 1 G/50ML
SOLUTION INTRAVENOUS AS NEEDED
Status: DISCONTINUED | OUTPATIENT
Start: 2021-04-13 | End: 2021-04-13

## 2021-04-13 RX ADMIN — PROPOFOL 20 MG: 10 INJECTION, EMULSION INTRAVENOUS at 10:58

## 2021-04-13 RX ADMIN — LEVALBUTEROL HYDROCHLORIDE 1.25 MG: 1.25 SOLUTION, CONCENTRATE RESPIRATORY (INHALATION) at 20:11

## 2021-04-13 RX ADMIN — LIDOCAINE HYDROCHLORIDE 0.5 ML: 10 INJECTION, SOLUTION EPIDURAL; INFILTRATION; INTRACAUDAL; PERINEURAL at 09:57

## 2021-04-13 RX ADMIN — SODIUM CHLORIDE, SODIUM LACTATE, POTASSIUM CHLORIDE, AND CALCIUM CHLORIDE 50 ML/HR: .6; .31; .03; .02 INJECTION, SOLUTION INTRAVENOUS at 09:57

## 2021-04-13 RX ADMIN — IPRATROPIUM BROMIDE 0.5 MG: 0.5 SOLUTION RESPIRATORY (INHALATION) at 20:11

## 2021-04-13 RX ADMIN — ONDANSETRON 4 MG: 2 INJECTION INTRAMUSCULAR; INTRAVENOUS at 11:05

## 2021-04-13 RX ADMIN — MIDAZOLAM 2 MG: 1 INJECTION INTRAMUSCULAR; INTRAVENOUS at 10:21

## 2021-04-13 RX ADMIN — DOCUSATE SODIUM 100 MG: 100 CAPSULE, LIQUID FILLED ORAL at 17:25

## 2021-04-13 RX ADMIN — HEPARIN SODIUM 5000 UNITS: 5000 INJECTION INTRAVENOUS; SUBCUTANEOUS at 20:30

## 2021-04-13 RX ADMIN — FINASTERIDE 5 MG: 5 TABLET, FILM COATED ORAL at 17:26

## 2021-04-13 RX ADMIN — PREDNISONE 20 MG: 20 TABLET ORAL at 17:25

## 2021-04-13 RX ADMIN — CEFAZOLIN SODIUM 1000 MG: 1 SOLUTION INTRAVENOUS at 10:30

## 2021-04-13 RX ADMIN — IPRATROPIUM BROMIDE 0.5 MG: 0.5 SOLUTION RESPIRATORY (INHALATION) at 12:46

## 2021-04-13 RX ADMIN — PROPOFOL 30 MG: 10 INJECTION, EMULSION INTRAVENOUS at 10:33

## 2021-04-13 RX ADMIN — OXYBUTYNIN CHLORIDE 5 MG: 5 TABLET ORAL at 17:26

## 2021-04-13 NOTE — INTERVAL H&P NOTE
H&P reviewed  After examining the patient I find no changes in the patients condition since the H&P had been written  Vitals:    04/13/21 0942   BP: 102/66   Pulse: 95   Resp: 20   Temp: 99 3 °F (37 4 °C)   SpO2: 97%     Will proceed with planned bladder biopsy and TURP  Procedure reviewed and all questions answered  Will plan observation overnight and home with tyler catheter

## 2021-04-13 NOTE — ANESTHESIA POSTPROCEDURE EVALUATION
Post-Op Assessment Note    CV Status:  Stable  Pain Score: 0    Pain management: adequate     Mental Status:  Alert and awake   Hydration Status:  Euvolemic   PONV Controlled:  Controlled   Airway Patency:  Patent      Post Op Vitals Reviewed: Yes      Staff: CRNA         No complications documented      BP   91/64   Temp   97 7   Pulse  80   Resp   12   SpO2   99

## 2021-04-13 NOTE — RESPIRATORY THERAPY NOTE
RT Protocol Note  Aria Fails  61 y o  male MRN: [id number]  Unit/Bed#: [facility] 833-01 Encounter: [id number]    Assessment    Principal Problem:    BPH with obstruction/lower urinary tract symptoms      Home Pulmonary Medications:  UDN QID  Home Devices/Therapy: Home O2    Past Medical History:   Diagnosis Date    Arthritis     Bladder mass     Cardiomyopathy ([address] )     Chest pain     [facility]     CPAP (continuous positive airway pressure) dependence     [facility]physema of lung ([facility])     Hypoxia     nocturnal    Left bundle branch block     Multiple pulmonary nodules     last assessed: 10/12/16    Pneumonia     Sleep apnea     Smoker      Social History     Socioeconomic History    Marital status: /Civil Union     Spouse name: None    Number of children: None    Years of education: None    Highest education level: None   Occupational History    None   Social Needs    Financial resource strain: None    Food insecurity     Worry: None     Inability: None    Transportation needs     Medical: None     Non-medical: None   Tobacco Use    Smoking status: Former Smoker     Packs/day: 2 50     Years: 37 00     Pack years: 92 50     Types: Cigarettes     Start date:      Quit date:      Years since quittin 2    Smokeless tobacco: Former User    Tobacco comment: 1 ppd for 37 years, 2010 down to 5 cigs a day, is around second hand smoke   Substance and Sexual Activity    Alcohol use: Not Currently     Comment: rarely    Drug use: No    Sexual activity: Not Currently     Partners: Female   Lifestyle    Physical activity     Days per week: None     Minutes per session: None    Stress: None   Relationships    Social connections     Talks on phone: None     Gets together: None     Attends Buddhism service: None     Active member of club or organization: None     Attends meetings of clubs or organizations: None     Relationship status: None    Intimate partner violence     Fear of current or ex partner: None     Emotionally abused: None     Physically abused: None     Forced sexual activity: None   Other Topics Concern    None   Social History Narrative    Daily coffee consumption: 8 or more cups a day           Subjective         Objective    Physical Exam:   Assessment Type: Assess only  Bilateral Breath Sounds: Diminished, Coarse  O2 Device: (P) 3L    Vitals:  Blood pressure 110/67, pulse 85, temperature 97 6 °F (36 4 °C), resp  rate 20, height 5' 2" (1 575 m), weight 56 7 kg (125 lb), SpO2 96 %  Imaging and other studies: I have personally reviewed pertinent reports  O2 Device: (P) 3L     Plan    Respiratory Plan: Home Bronchodilator Patient pathway        Resp Comments: (P) Pt  s/p cystoscopy  Pt  with hx of COPD  Pt states he uses duoneb QID at home  Pt states he also wears O2  Currently in no resp  distress   Will order UDN tID with xopenex/atrovent

## 2021-04-13 NOTE — OP NOTE
OPERATIVE REPORT  PATIENT NAME: Adelaide Younger  :  1954  MRN: [id number]  Pt Location: [facility] 01    SURGERY DATE: 2021    Surgeon(s) and Role:     Liban Orellana MD - Primary    Preop Diagnosis:  Benign prostatic hyperplasia with urinary obstruction [N40 1, N13 8]    Post-Op Diagnosis Codes: * Benign prostatic hyperplasia with urinary obstruction [N40 1, N13 8]    Procedure(s) (LRB):  TRANSURETHRAL RESECTION OF PROSTATE (TURP) BLADDER BIOPSY, FULGURATION (N/A)  CYSTOSCOPY WITH BIOPSIES (N/A)    Specimen(s):  ID Type Source Tests Collected by Time Destination   1 : bladder biopsy Tissue Urinary Bladder TISSUE EXAM Devante Bermudez MD 2021 1041    2 : prostate chips Tissue Prostate TISSUE EXAM Devante Bermudez MD 2021 1042        Estimated Blood Loss:   Minimal    Drains:  Urethral Catheter Three way 24 Fr  (Active)   Number of days: 0       Anesthesia Type:   General    Operative Indications:  Benign prostatic hyperplasia with urinary obstruction [N40 1, N13 8]  Abnormal left humaira ureteral bladder lesion    Operative Findings:  Abnormal left humaira ureteral bladder lesion  No synchronous lesions  Bladder outlet obstruction    Complications:   None    Procedure and Technique:  The patient was identified, brought to the operating room, and placed on the table in supine position  After induction of general anesthesia, the patient was placed in dorsal lithotomy position and prepped and draped in the usual sterile fashion  A complete formal timeout was performed  The 25 [place] rigid cystoscope was placed per urethra and cystoscopy was performed  Mild trabeculation noted  Both ureteral orifices in normal position  Adjacent to the left ureter, there is abnormal mucosal change, potentially consistent with malignancy or inflammation  The cold cup biopsy forceps were placed and this entire area was resected  The resectoscope was then placed after calibrating the urethra    I carefully cauterized the area of biopsy with the loop cautery  Transurethral resection of the prostate was performed using the loop electrocautery from bladder neck to verumontanum  The prostate was not overly enlarged, however there was a significant median lobe component that obstructed the bladder outlet almost completely  First the median lobe was taken down  Secondly the lateral lobes were taken down to the level of the circular fibers  Finally apical tissue and anterior tissue were resected  All chips were irrigated out with the Ellik evacuators  Hemostasis was achieved with electrocautery  At the conclusion a 24 Western Peg three-way catheter was placed with continuous bladder irrigation  Mild traction was applied  The efflux ran clear  The patient tolerated the procedure well and was transferred to the recovery room awake alert and in stable condition         I was present for the entire procedure    Patient Disposition:  PACU     SIGNATURE: Julee Peabody, MD  DATE: April 13, 2021  TIME: 11:36 AM

## 2021-04-13 NOTE — ANESTHESIA PREPROCEDURE EVALUATION
Procedure:  TRANSURETHRAL RESECTION OF PROSTATE (TURP) BLADDER BIOPSY, FULGURATION (N/A Abdomen)  CYSTOSCOPY WITH BIOPSIES (N/A Bladder)    Relevant Problems   CARDIO   (+) Chronic systolic congestive heart failure (HCC)   (+) Coronary artery disease   (+) Hypercholesterolemia   (+) Left bundle-branch block      GI/HEPATIC   (+) Gastroesophageal reflux disease      MUSCULOSKELETAL   (+) Osteoarthritis      PULMONARY   (+) Chronic hypoxemic respiratory failure (HCC)   (+) Chronic obstructive pulmonary disease (HCC)   (+) KEVIN and COPD overlap syndrome (HCC)        Physical Exam    Airway    Mallampati score: II  TM Distance: >3 FB  Neck ROM: full     Dental   lower dentures and upper dentures,     Cardiovascular  Rhythm: regular, Rate: normal, Cardiovascular exam normal    Pulmonary  Decreased breath sounds, No wheezes,     Other Findings        Anesthesia Plan  ASA Score- 3     Anesthesia Type- spinal with ASA Monitors  Additional Monitors:   Airway Plan: NTT  Plan Factors-Exercise tolerance (METS): <4 METS  Chart reviewed  EKG reviewed  Imaging results reviewed  Existing labs reviewed  Patient summary reviewed  Patient did not smoke on day of surgery  Induction-     Postoperative Plan-     Informed Consent- Anesthetic plan and risks discussed with patient and spouse  I personally reviewed this patient with the CRNA  Discussed and agreed on the Anesthesia Plan with the CRNA  Karrie Nissen

## 2021-04-13 NOTE — ANESTHESIA PROCEDURE NOTES
Spinal Block    Patient location during procedure: OR  Start time: 4/13/2021 10:28 AM  Reason for block: primary anesthetic  Staffing  Performed: anesthesiologist   Preanesthetic Checklist  Completed: patient identified, site marked, surgical consent, pre-op evaluation, timeout performed, IV checked, risks and benefits discussed and monitors and equipment checked  Spinal Block  Patient position: sitting  Prep: ChloraPrep  Patient monitoring: cardiac monitor and frequent blood pressure checks  Approach: midline  Location: L4-5  Injection technique: single-shot  Needle  Needle type: pencil-tip   Needle gauge: 25 G  Needle length: 10 cm  Assessment  Sensory level: T4  Injection Assessment:  negative aspiration for heme, no paresthesia on injection and positive aspiration for clear CSF    Post-procedure:  adhesive bandage applied, pressure dressing applied, secured with tape, site cleaned and sterile dressing applied

## 2021-04-13 NOTE — PLAN OF CARE
Problem: PAIN - ADULT  Goal: Verbalizes/displays adequate comfort level or baseline comfort level  Description: Interventions:  - Encourage patient to monitor pain and request assistance  - Assess pain using appropriate pain scale  - Administer analgesics based on type and severity of pain and evaluate response  - Implement non-pharmacological measures as appropriate and evaluate response  - Consider cultural and social influences on pain and pain management  - Notify physician/advanced practitioner if interventions unsuccessful or patient reports new pain  Outcome: Progressing     Problem: INFECTION - ADULT  Goal: Absence or prevention of progression during hospitalization  Description: INTERVENTIONS:  - Assess and monitor for signs and symptoms of infection  - Monitor lab/diagnostic results  - Monitor all insertion sites, i e  indwelling lines, tubes, and drains  - Monitor endotracheal if appropriate and nasal secretions for changes in amount and color  - [place] appropriate cooling/warming therapies per order  - Administer medications as ordered  - Instruct and encourage patient and family to use good hand hygiene technique  - Identify and instruct in appropriate isolation precautions for identified infection/condition  Outcome: Progressing  Goal: Absence of fever/infection during neutropenic period  Description: INTERVENTIONS:  - Monitor WBC    Outcome: Progressing     Problem: SAFETY ADULT  Goal: Patient will remain free of falls  Description: INTERVENTIONS:  - Assess patient frequently for physical needs  -  Identify cognitive and physical deficits and behaviors that affect risk of falls    -  [place] fall precautions as indicated by assessment   - Educate patient/family on patient safety including physical limitations  - Instruct patient to call for assistance with activity based on assessment  - Modify environment to reduce risk of injury  - Consider OT/PT consult to assist with strengthening/mobility  Outcome: Progressing  Goal: Maintain or return to baseline ADL function  Description: INTERVENTIONS:  -  Assess patient's ability to carry out ADLs; assess patient's baseline for ADL function and identify physical deficits which impact ability to perform ADLs (bathing, care of mouth/teeth, toileting, grooming, dressing, etc )  - Assess/evaluate cause of self-care deficits   - Assess range of motion  - Assess patient's mobility; develop plan if impaired  - Assess patient's need for assistive devices and provide as appropriate  - Encourage maximum independence but intervene and supervise when necessary  - Involve family in performance of ADLs  - Assess for home care needs following discharge   - Consider OT consult to assist with ADL evaluation and planning for discharge  - Provide patient education as appropriate  Outcome: Progressing  Goal: Maintain or return mobility status to optimal level  Description: INTERVENTIONS:  - Assess patient's baseline mobility status (ambulation, transfers, stairs, etc )    - Identify cognitive and physical deficits and behaviors that affect mobility  - Identify mobility aids required to assist with transfers and/or ambulation (gait belt, sit-to-stand, lift, walker, cane, etc )  - [place] fall precautions as indicated by assessment  - Record patient progress and toleration of activity level on Mobility SBAR; progress patient to next Phase/Stage  - Instruct patient to call for assistance with activity based on assessment  - Consider rehabilitation consult to assist with strengthening/weightbearing, etc   Outcome: Progressing     Problem: DISCHARGE PLANNING  Goal: Discharge to home or other facility with appropriate resources  Description: INTERVENTIONS:  - Identify barriers to discharge w/patient and caregiver  - Arrange for needed discharge resources and transportation as appropriate  - Identify discharge learning needs (meds, wound care, etc )  - Arrange for interpretive services to assist at discharge as needed  - Refer to Case Management Department for coordinating discharge planning if the patient needs post-hospital services based on physician/advanced practitioner order or complex needs related to functional status, cognitive ability, or social support system  Outcome: Progressing     Problem: Knowledge Deficit  Goal: Patient/family/caregiver demonstrates understanding of disease process, treatment plan, medications, and discharge instructions  Description: Complete learning assessment and assess knowledge base    Interventions:  - Provide teaching at level of understanding  - Provide teaching via preferred learning methods  Outcome: Progressing

## 2021-04-14 ENCOUNTER — TELEPHONE (OUTPATIENT)
Dept: OTHER | Facility: HOSPITAL | Age: 66
End: 2021-04-14

## 2021-04-14 ENCOUNTER — TELEPHONE (OUTPATIENT)
Dept: UROLOGY | Facility: CLINIC | Age: 66
End: 2021-04-14

## 2021-04-14 VITALS
HEART RATE: 78 BPM | WEIGHT: 125 LBS | BODY MASS INDEX: 23 KG/M2 | OXYGEN SATURATION: 97 % | HEIGHT: 62 IN | DIASTOLIC BLOOD PRESSURE: 63 MMHG | RESPIRATION RATE: 16 BRPM | SYSTOLIC BLOOD PRESSURE: 106 MMHG | TEMPERATURE: 97.8 F

## 2021-04-14 LAB
ANION GAP SERPL CALCULATED.3IONS-SCNC: 5 MMOL/L (ref 4–13)
BUN SERPL-MCNC: 15 MG/DL (ref 5–25)
CALCIUM SERPL-MCNC: 9 MG/DL (ref 8.3–10.1)
CHLORIDE SERPL-SCNC: 103 MMOL/L (ref 100–108)
CO2 SERPL-SCNC: 29 MMOL/L (ref 21–32)
CREAT SERPL-MCNC: 0.81 MG/DL (ref 0.6–1.3)
ERYTHROCYTE [DISTWIDTH] IN BLOOD BY AUTOMATED COUNT: 12.9 % (ref 11.6–15.1)
GFR SERPL CREATININE-BSD FRML MDRD: 94 ML/MIN/1.73SQ M
GLUCOSE P FAST SERPL-MCNC: 104 MG/DL (ref 65–99)
GLUCOSE SERPL-MCNC: 104 MG/DL (ref 65–140)
HCT VFR BLD AUTO: 41 % (ref 36.5–49.3)
HGB BLD-MCNC: 12.9 G/DL (ref 12–17)
MCH RBC QN AUTO: 31.4 PG (ref 26.8–34.3)
MCHC RBC AUTO-ENTMCNC: 31.5 G/DL (ref 31.4–37.4)
MCV RBC AUTO: 100 FL (ref 82–98)
PLATELET # BLD AUTO: 225 THOUSANDS/UL (ref 149–390)
PMV BLD AUTO: 9.6 FL (ref 8.9–12.7)
POTASSIUM SERPL-SCNC: 4.5 MMOL/L (ref 3.5–5.3)
RBC # BLD AUTO: 4.11 MILLION/UL (ref 3.88–5.62)
SODIUM SERPL-SCNC: 137 MMOL/L (ref 136–145)
WBC # BLD AUTO: 8.69 THOUSAND/UL (ref 4.31–10.16)

## 2021-04-14 PROCEDURE — 85027 COMPLETE CBC AUTOMATED: CPT | Performed by: UROLOGY

## 2021-04-14 PROCEDURE — 94640 AIRWAY INHALATION TREATMENT: CPT

## 2021-04-14 PROCEDURE — 99024 POSTOP FOLLOW-UP VISIT: CPT | Performed by: PHYSICIAN ASSISTANT

## 2021-04-14 PROCEDURE — 80048 BASIC METABOLIC PNL TOTAL CA: CPT | Performed by: UROLOGY

## 2021-04-14 PROCEDURE — NC001 PR NO CHARGE: Performed by: PHYSICIAN ASSISTANT

## 2021-04-14 PROCEDURE — 94760 N-INVAS EAR/PLS OXIMETRY 1: CPT

## 2021-04-14 RX ORDER — DOCUSATE SODIUM 100 MG/1
100 CAPSULE, LIQUID FILLED ORAL 2 TIMES DAILY
Qty: 14 CAPSULE | Refills: 0 | Status: SHIPPED | OUTPATIENT
Start: 2021-04-14 | End: 2022-04-11

## 2021-04-14 RX ORDER — FINASTERIDE 5 MG/1
5 TABLET, FILM COATED ORAL DAILY
Qty: 7 TABLET | Refills: 0 | Status: SHIPPED | OUTPATIENT
Start: 2021-04-14 | End: 2022-03-27

## 2021-04-14 RX ORDER — HYDROCODONE BITARTRATE AND ACETAMINOPHEN 5; 325 MG/1; MG/1
1 TABLET ORAL EVERY 6 HOURS PRN
Qty: 10 TABLET | Refills: 0 | Status: SHIPPED | OUTPATIENT
Start: 2021-04-14 | End: 2021-04-21

## 2021-04-14 RX ORDER — OXYBUTYNIN CHLORIDE 5 MG/1
5 TABLET ORAL 2 TIMES DAILY
Qty: 8 TABLET | Refills: 0 | Status: SHIPPED | OUTPATIENT
Start: 2021-04-14 | End: 2022-01-13

## 2021-04-14 RX ORDER — BACITRACIN, NEOMYCIN, POLYMYXIN B 400; 3.5; 5 [USP'U]/G; MG/G; [USP'U]/G
OINTMENT TOPICAL 2 TIMES DAILY
Qty: 15 G | Refills: 0 | Status: SHIPPED | OUTPATIENT
Start: 2021-04-14 | End: 2022-01-13

## 2021-04-14 RX ADMIN — HEPARIN SODIUM 5000 UNITS: 5000 INJECTION INTRAVENOUS; SUBCUTANEOUS at 09:25

## 2021-04-14 RX ADMIN — BACITRACIN ZINC NEOMYCIN SULFATE POLYMYXIN B SULFATE 1 LARGE APPLICATION: 400; 3.5; 5 OINTMENT TOPICAL at 09:26

## 2021-04-14 RX ADMIN — LEVALBUTEROL HYDROCHLORIDE 1.25 MG: 1.25 SOLUTION, CONCENTRATE RESPIRATORY (INHALATION) at 07:10

## 2021-04-14 RX ADMIN — DOCUSATE SODIUM 100 MG: 100 CAPSULE, LIQUID FILLED ORAL at 09:25

## 2021-04-14 RX ADMIN — PREDNISONE 20 MG: 20 TABLET ORAL at 09:25

## 2021-04-14 RX ADMIN — LEVALBUTEROL HYDROCHLORIDE 1.25 MG: 1.25 SOLUTION, CONCENTRATE RESPIRATORY (INHALATION) at 13:37

## 2021-04-14 RX ADMIN — IPRATROPIUM BROMIDE 0.5 MG: 0.5 SOLUTION RESPIRATORY (INHALATION) at 07:11

## 2021-04-14 RX ADMIN — IPRATROPIUM BROMIDE 0.5 MG: 0.5 SOLUTION RESPIRATORY (INHALATION) at 13:37

## 2021-04-14 RX ADMIN — FINASTERIDE 5 MG: 5 TABLET, FILM COATED ORAL at 09:25

## 2021-04-14 RX ADMIN — OXYBUTYNIN CHLORIDE 5 MG: 5 TABLET ORAL at 09:25

## 2021-04-14 RX ADMIN — PANTOPRAZOLE SODIUM 40 MG: 40 TABLET, DELAYED RELEASE ORAL at 05:10

## 2021-04-14 NOTE — ANESTHESIA POSTPROCEDURE EVALUATION
Post-Op Assessment Note    CV Status:  Stable  Pain Score: 0    Pain management: adequate     Mental Status:  Alert and awake   PONV Controlled:  None      Post Op Vitals Reviewed: Yes      Staff: Anesthesiologist         No complications documented      BP      Temp      Pulse     Resp      SpO2

## 2021-04-14 NOTE — PLAN OF CARE
Problem: PAIN - ADULT  Goal: Verbalizes/displays adequate comfort level or baseline comfort level  Description: Interventions:  - Encourage patient to monitor pain and request assistance  - Assess pain using appropriate pain scale  - Administer analgesics based on type and severity of pain and evaluate response  - Implement non-pharmacological measures as appropriate and evaluate response  - Consider cultural and social influences on pain and pain management  - Notify physician/advanced practitioner if interventions unsuccessful or patient reports new pain  Outcome: Progressing     Problem: INFECTION - ADULT  Goal: Absence or prevention of progression during hospitalization  Description: INTERVENTIONS:  - Assess and monitor for signs and symptoms of infection  - Monitor lab/diagnostic results  - Monitor all insertion sites, i e  indwelling lines, tubes, and drains  - Monitor endotracheal if appropriate and nasal secretions for changes in amount and color  - [place] appropriate cooling/warming therapies per order  - Administer medications as ordered  - Instruct and encourage patient and family to use good hand hygiene technique  - Identify and instruct in appropriate isolation precautions for identified infection/condition  Outcome: Progressing  Goal: Absence of fever/infection during neutropenic period  Description: INTERVENTIONS:  - Monitor WBC    Outcome: Progressing     Problem: SAFETY ADULT  Goal: Patient will remain free of falls  Description: INTERVENTIONS:  - Assess patient frequently for physical needs  -  Identify cognitive and physical deficits and behaviors that affect risk of falls    -  [place] fall precautions as indicated by assessment   - Educate patient/family on patient safety including physical limitations  - Instruct patient to call for assistance with activity based on assessment  - Modify environment to reduce risk of injury  - Consider OT/PT consult to assist with strengthening/mobility  Outcome: Progressing  Goal: Maintain or return to baseline ADL function  Description: INTERVENTIONS:  -  Assess patient's ability to carry out ADLs; assess patient's baseline for ADL function and identify physical deficits which impact ability to perform ADLs (bathing, care of mouth/teeth, toileting, grooming, dressing, etc )  - Assess/evaluate cause of self-care deficits   - Assess range of motion  - Assess patient's mobility; develop plan if impaired  - Assess patient's need for assistive devices and provide as appropriate  - Encourage maximum independence but intervene and supervise when necessary  - Involve family in performance of ADLs  - Assess for home care needs following discharge   - Consider OT consult to assist with ADL evaluation and planning for discharge  - Provide patient education as appropriate  Outcome: Progressing  Goal: Maintain or return mobility status to optimal level  Description: INTERVENTIONS:  - Assess patient's baseline mobility status (ambulation, transfers, stairs, etc )    - Identify cognitive and physical deficits and behaviors that affect mobility  - Identify mobility aids required to assist with transfers and/or ambulation (gait belt, sit-to-stand, lift, walker, cane, etc )  - [place] fall precautions as indicated by assessment  - Record patient progress and toleration of activity level on Mobility SBAR; progress patient to next Phase/Stage  - Instruct patient to call for assistance with activity based on assessment  - Consider rehabilitation consult to assist with strengthening/weightbearing, etc   Outcome: Progressing     Problem: DISCHARGE PLANNING  Goal: Discharge to home or other facility with appropriate resources  Description: INTERVENTIONS:  - Identify barriers to discharge w/patient and caregiver  - Arrange for needed discharge resources and transportation as appropriate  - Identify discharge learning needs (meds, wound care, etc )  - Arrange for interpretive services to assist at discharge as needed  - Refer to Case Management Department for coordinating discharge planning if the patient needs post-hospital services based on physician/advanced practitioner order or complex needs related to functional status, cognitive ability, or social support system  Outcome: Progressing     Problem: Knowledge Deficit  Goal: Patient/family/caregiver demonstrates understanding of disease process, treatment plan, medications, and discharge instructions  Description: Complete learning assessment and assess knowledge base  Interventions:  - Provide teaching at level of understanding  - Provide teaching via preferred learning methods  Outcome: Progressing     Problem: Potential for Falls  Goal: Patient will remain free of falls  Description: INTERVENTIONS:  - Assess patient frequently for physical needs  -  Identify cognitive and physical deficits and behaviors that affect risk of falls    -  [place] fall precautions as indicated by assessment   - Educate patient/family on patient safety including physical limitations  - Instruct patient to call for assistance with activity based on assessment  - Modify environment to reduce risk of injury  - Consider OT/PT consult to assist with strengthening/mobility  Outcome: Progressing

## 2021-04-14 NOTE — PROGRESS NOTES
Progress Note - Urology  Olga Washington  61 y o  male MRN: [id number]  Unit/Bed#: [facility] 833-01 Encounter: [id number]    Assessment & Plan:    Benign prostatic hypertrophy:  -postop day 1 transurethral resection of the prostate progressing well  -patient's urine is currently clear to light pink color on CBI  CBI was clamped will re-evaluate this evening for discharge   -patient's vital signs and labs are stable  -patient currently on baseline O2 nasal cannula oxygen at 3 L   -hemoglobin is 12 9  -creatinine 0 81  -no postoperative leukocytosis  -task sent to office to schedule for voiding trial outpatient on Friday, patient to follow-up with pathology in 2 weeks with MD      Subjective/Objective   Chief Complaint: none    Subjective:   Patient currently reporting that he is doing well  He is tolerating a soft diet, denies any nausea, vomiting, fevers, chills, flank pain  Patient denies any current pain he reports his pain is relatively well controlled if he has any  Patient currently on 0 2 oxygen he reports that he is usually on 3 L at home  His wife to call his portable oxygen and will be bringing it with her when she comes to pick him up for discharge  Objective:     Blood pressure 106/63, pulse 78, temperature 97 8 °F (36 6 °C), temperature source Oral, resp  rate 16, height 5' 2" (1 575 m), weight 56 7 kg (125 lb), SpO2 90 %  ,Body mass index is 22 86 kg/m²  Intake/Output Summary (Last 24 hours) at 4/14/2021 1154  Last data filed at 4/14/2021 0930  Gross per 24 hour   Intake 700 ml   Output 35082 ml   Net -42973 ml       Invasive Devices     Peripheral Intravenous Line            Peripheral IV 04/13/21 Left Antecubital 1 day          Drain            Continuous Bladder Irrigation 1 day    Urethral Catheter Three way 24 Fr  1 day                Physical Exam  Constitutional:       General: He is not in acute distress  Appearance: Normal appearance  He is normal weight   He is not ill-appearing or toxic-appearing  HENT:      Head: Normocephalic and atraumatic  Right Ear: External ear normal       Left Ear: External ear normal       Nose: Nose normal    Eyes:      General: No scleral icterus  Conjunctiva/sclera: Conjunctivae normal    Neck:      Musculoskeletal: Normal range of motion  Cardiovascular:      Rate and Rhythm: Normal rate and regular rhythm  Pulses: Normal pulses  Heart sounds: Normal heart sounds  No murmur  No friction rub  No gallop  Pulmonary:      Effort: Pulmonary effort is normal  No respiratory distress  Breath sounds: No wheezing, rhonchi or rales  Abdominal:      General: Bowel sounds are normal  There is no distension  Tenderness: There is no abdominal tenderness  There is no right CVA tenderness or left CVA tenderness  Genitourinary:     Comments: Urethral Stanley catheter attached to CBI draining clear yellow to light pink urine  Musculoskeletal: Normal range of motion  Skin:     General: Skin is warm and dry  Neurological:      General: No focal deficit present  Mental Status: He is alert and oriented to person, place, and time  Psychiatric:         Mood and Affect: Mood normal          Behavior: Behavior normal          Thought Content: Thought content normal          Judgment: Judgment normal        Lab, Imaging and other studies:I have personally reviewed pertinent lab results      Lab Results   Component Value Date    WBC 8 69 04/14/2021    HGB 12 9 04/14/2021    HCT 41 0 04/14/2021     (H) 04/14/2021     04/14/2021     Lab Results   Component Value Date    SODIUM 137 04/14/2021    K 4 5 04/14/2021     04/14/2021    CO2 29 04/14/2021    BUN 15 04/14/2021    CREATININE 0 81 04/14/2021    GLUC 104 04/14/2021    CALCIUM 9 0 04/14/2021       VTE Pharmacologic Prophylaxis: Heparin  VTE Mechanical Prophylaxis: sequential compression device     Carey Kraft PA-C

## 2021-04-14 NOTE — TELEPHONE ENCOUNTER
Post Op Note    Dony Rodriguez  is a 61 y o  male s/pTRANSURETHRAL RESECTION OF PROSTATE (TURP) BLADDER BIOPSY, FULGURATION CYSTOSCOPY WITH BIOPSIES   performed 4/13/2021  Dony Rodriguez  is a patient of Dr Roni Harman and is seen at the [facility] office

## 2021-04-14 NOTE — DISCHARGE SUMMARY
Discharge Summary - Rodriguez Gastelum  61 y o  male MRN: [id number]    [facility] Encounter: [id number]    Admission Date: 04/13/2021    Admitting Diagnosis: Benign prostatic hyperplasia with urinary obstruction [N40 1, N13 8]    HPI:  Patient is a 53-year-old male with a past urologic history of benign prostatic hypertrophy with lower urinary tract symptoms  Patient underwent evaluation for surgical intervention given patient's urinary symptoms which were bothersome  Patient underwent cystoscopy and transrectal ultrasound which revealed a enlarged prostate with a median lobe component along with invagination into the bladder and produces near to complete obstruction  Patient was cleared by pulmonology for surgical intervention  Patient plan for transurethral resection of the prostate but Dr Chivo Webb on 04/13/2021  Procedures Performed: No orders of the defined types were placed in this encounter  Summary of Hospital Course:   Patient presented to the hospital on 04/13/2021 and was admitted to preop holding  Patient underwent trans urethral resection of the prostate bladder biopsy and fulguration  Postoperatively patient was stable and he remained  overnight for observation  Postop day 1 patient was tolerating a diet, mild pain, urine was clear on CBI and clamped  Patient re-evaluated later in the afternoon patient was continuing to tolerate advancement of diet, urine continued to be clear, pain well tolerated and patient ambulating with no desaturation in his baseline O2 stat  Patient was then deemed stable from a urologic standpoint for discharge  Significant Findings, Care, Treatment and Services Provided:  Abnormal left care ureteral bladder lesion, bladder outlet obstruction      Complications:  None    Discharge Diagnosis:  Benign prostatic hypertrophy    Resolved Problems  Date Reviewed: 4/13/2021    None          Condition at Discharge: stable         Discharge instructions/Information to patient and family:   See after visit summary for information provided to patient and family  Provisions for Follow-Up Care:  See after visit summary for information related to follow-up care and any pertinent home health orders  PCP: Bernice Robbins DO    Disposition: Home    Planned Readmission: No      Discharge Statement   I spent 20 minutes discharging the patient  This time was spent on the day of discharge  I had direct contact with the patient on the day of discharge  Additional documentation is required if more than 30 minutes were spent on discharge  Discharge Medications:  See after visit summary for reconciled discharge medications provided to patient and family         Demetrius Cordova PA-C

## 2021-04-14 NOTE — TELEPHONE ENCOUNTER
Patient may have Stanley catheter removed at nurse's visit on Friday an additional appointment with pathology may be performed in 2 weeks  Discussed with Dr Donaldson Record this a m      Thank you

## 2021-04-14 NOTE — DISCHARGE INSTR - AVS FIRST PAGE
Oskar Rothman discharged with a couple of medications  This was sent to the [facility] on [address]  These medications include Colace, Proscar, Norco, Neosporin and Ditropan  Britton Shires is a narcotic and is for pain  Colace as a stool softener and used to help you have bowel movements with narcotics  Proscar as a medication to help decrease bleeding from the prostate  Neosporin is to be used as needed for catheter irritation at the tip of the penis  And Ditropan is used as needed for spasms while Stanley catheter is in place  If you have any additional questions or concerns please feel free to call our office  The number is attached to this discharge  Our office will call you to schedule your voiding trial Friday morning      Thank you,  Gisela Reyna PA-C

## 2021-04-14 NOTE — TELEPHONE ENCOUNTER
Post Op Note     Zahida Arthur  is a 61 y o  male s/pTRANSURETHRAL RESECTION OF PROSTATE (TURP) BLADDER BIOPSY, FULGURATION CYSTOSCOPY WITH BIOPSIES   performed 4/13/2021  Zahida Arthur  is a patient of Dr Ramon Doe and is seen at the [place] office  Patient remains inpatient at this time  Will monitor for discharge and follow up at that time  Patient is scheduled for tyler removal 4/16 at [place] office and post op appointment with Dr Ramon Doe on 4/28

## 2021-04-16 ENCOUNTER — PROCEDURE VISIT (OUTPATIENT)
Dept: UROLOGY | Facility: AMBULATORY SURGERY CENTER | Age: 66
End: 2021-04-16
Payer: COMMERCIAL

## 2021-04-16 VITALS
DIASTOLIC BLOOD PRESSURE: 62 MMHG | SYSTOLIC BLOOD PRESSURE: 110 MMHG | BODY MASS INDEX: 23 KG/M2 | WEIGHT: 125 LBS | HEART RATE: 76 BPM | HEIGHT: 62 IN

## 2021-04-16 DIAGNOSIS — N32.89 BLADDER MASS: Primary | ICD-10-CM

## 2021-04-16 LAB — POST-VOID RESIDUAL VOLUME, ML POC: 254 ML

## 2021-04-16 PROCEDURE — 51798 US URINE CAPACITY MEASURE: CPT | Performed by: UROLOGY

## 2021-04-16 PROCEDURE — 99024 POSTOP FOLLOW-UP VISIT: CPT | Performed by: UROLOGY

## 2021-04-16 NOTE — PROGRESS NOTES
4/16/2021    Rosamaria Waters   4/30/1954  [id number]    Diagnosis  Chief Complaint     Post-op          Patient presents for void trial s/p TURP managed by Dr Bartolome Linares      Procedure Tyler removal/voiding trial    Tyler catheter removed after deflation of an intact balloon  Patient tolerated well  Encouraged patient to hydrate well and return this afternoon for post void residual  He knows he may return early if uncomfortable and unable to urinate  Patient agrees to this plan  Patient returned this afternoon  Patient states able to void  Patient voided 200 ml while in office  Bladder ultrasound performed and PVR measured 254 ml  Had patient attempt to void again, he voided approximately 50 mL, bladder scan for 199 mL  Advised Dr Jose L Herr of results, per MD ok for patient to go home without tyler  Encouraged increased water intake and avoiding bladder irritants  ER precautions reviewed       Recent Results (from the past 1 hour(s))   POCT Measure PVR    Collection Time: 04/16/21  3:01 PM   Result Value Ref Range    POST-VOID RESIDUAL VOLUME, ML  mL         Vitals:    04/16/21 0911   BP: 110/62   BP Location: Right arm   Patient Position: Sitting   Cuff Size: Adult   Pulse: 76   Weight: 56 7 kg (125 lb)   Height: 5' 2" (1 575 m)           Gissel Valentino RN

## 2021-04-28 ENCOUNTER — OFFICE VISIT (OUTPATIENT)
Dept: UROLOGY | Facility: AMBULATORY SURGERY CENTER | Age: 66
End: 2021-04-28
Payer: COMMERCIAL

## 2021-04-28 VITALS
WEIGHT: 121 LBS | HEART RATE: 63 BPM | DIASTOLIC BLOOD PRESSURE: 70 MMHG | SYSTOLIC BLOOD PRESSURE: 122 MMHG | HEIGHT: 62 IN | BODY MASS INDEX: 22.26 KG/M2

## 2021-04-28 DIAGNOSIS — C61 PROSTATE CANCER (HCC): Primary | ICD-10-CM

## 2021-04-28 DIAGNOSIS — C67.0 MALIGNANT NEOPLASM OF TRIGONE OF URINARY BLADDER (HCC): ICD-10-CM

## 2021-04-28 LAB — POST-VOID RESIDUAL VOLUME, ML POC: 0 ML

## 2021-04-28 PROCEDURE — 99214 OFFICE O/P EST MOD 30 MIN: CPT | Performed by: UROLOGY

## 2021-04-28 PROCEDURE — 3008F BODY MASS INDEX DOCD: CPT | Performed by: NURSE PRACTITIONER

## 2021-04-28 PROCEDURE — 51798 US URINE CAPACITY MEASURE: CPT | Performed by: UROLOGY

## 2021-04-28 PROCEDURE — 1124F ACP DISCUSS-NO DSCNMKR DOCD: CPT | Performed by: UROLOGY

## 2021-04-28 NOTE — PROGRESS NOTES
4/28/2021    Isak Fernandez Jr   4/30/1954  [id number]        Assessment    Urothelial carcinoma of the bladder   T1a small volume prostate cancer status post TURP    Plan    We reviewed the pathology results  Bladder biopsy revealed hyperplasia / inflammation rather than malignancy  Review of the prostate specimen did reveal small volume Minh 6 prostate cancer  We had discussion today regarding grading staging of prostate cancer and recommendation for active surveillance based on this finding  His most recent PSA is more than 3year-old  Will recommend another PSA however would wait 3 months to allow for resolution of any residual inflammation from TURP  We discussed the importance of routine PSA as well as digital prostate examination  As long as PSA remains low, I recommend checking every 6 months initially  If there is any concern, would then proceed with standard prostate biopsy, but otherwise may opt to proceed with surveillance alone  He will also require periodic cystoscopy, would start in the 6 to 12 month range  All questions answered and they agree with the plan          History of Present Illness  Chela Rapp is a 61 y o  male  With the above history  He was found on recent cystoscopy to have abnormality concerning for malignancy on the bladder mucosa as well as obstruction due to median lobe  He is recently status post bladder biopsy and TURP  Returns today to discuss pathology  He reports excellent urinary stream and significant improvement in symptoms  He no longer has nocturia  No hematuria  Bladder scan PVR 0 mL  Review of Systems  Review of Systems   Constitutional: Negative  HENT: Negative  Respiratory: Negative  Cardiovascular: Negative  Gastrointestinal: Negative  Genitourinary:        As per HPI   Musculoskeletal: Negative  Skin: Negative  Neurological: Negative  Hematological: Negative            Past Medical History  Past Medical History: Diagnosis Date    Arthritis     Bladder mass     Cardiomyopathy [facility])     Chest pain     COPD (chronic obstructive pulmonary disease) (HCC)     CPAP (continuous positive airway pressure) dependence     Emphysema of lung (HCC)     Hypoxia     nocturnal    Left bundle branch block     Multiple pulmonary nodules     last assessed: 10/12/16    Pneumonia     Sleep apnea     Smoker        Past Social History  Past Surgical History:   Procedure Laterality Date    COLONOSCOPY      CYSTOSCOPY      CYSTOSCOPY  02/17/2021    ND CYSTOURETHROSCOPY,BIOPSY N/A 4/13/2021    Procedure: CYSTOSCOPY WITH BIOPSIES;  Surgeon: Floyd David MD;  [facility];  Service: Urology    ND CYSTOURETHROSCOPY,FULGUR <0 5 CM LESN N/A 1/3/2020    Procedure: TRANSURETHRAL RESECTION OF BLADDER TUMOR (TURBT);   Surgeon: Floyd David MD;  [facility];  Service: Urology    ND INSTILL ANTICANCER AGENT IN BLADDER N/A 1/3/2020    Procedure: [place];  Surgeon: Floyd David MD;  [facility];  Service: Urology    ND TRANSURETHRAL ELEC-SURG PROSTATECTOM N/A 4/13/2021    Procedure: TRANSURETHRAL RESECTION OF PROSTATE (TURP) BLADDER BIOPSY, FULGURATION;  Surgeon: Floyd David MD;  [facility];  Service: Urology       Past Family History  Family History   Problem Relation Age of Onset    Diabetes Mother        Past Social history  Social History     Socioeconomic History    Marital status: /Civil Union     Spouse name: Not on file    Number of children: Not on file    Years of education: Not on file    Highest education level: Not on file   Occupational History    Not on file   Social Needs    Financial resource strain: Not on file    Food insecurity     Worry: Not on file     Inability: Not on file   [employer] needs     Medical: Not on file     Non-medical: Not on file   Tobacco Use    Smoking status: Former Smoker     Packs/day: 2 50     Years: 37 00 Pack years: 92 50     Types: Cigarettes     Start date: 5     Quit date:      Years since quittin 3    Smokeless tobacco: Former User    Tobacco comment: 1 ppd for 37 years,  down to 5 cigs a day, is around second hand smoke   Substance and Sexual Activity    Alcohol use: Not Currently     Comment: rarely    Drug use: No    Sexual activity: Not Currently     Partners: Female   Lifestyle    Physical activity     Days per week: Not on file     Minutes per session: Not on file    Stress: Not on file   Relationships    Social connections     Talks on phone: Not on file     Gets together: Not on file     Attends [employer] service: Not on file     Active member of club or organization: Not on file     Attends meetings of clubs or organizations: Not on file     Relationship status: Not on file    Intimate partner violence     Fear of current or ex partner: Not on file     Emotionally abused: Not on file     Physically abused: Not on file     Forced sexual activity: Not on file   Other Topics Concern    Not on file   Social History Narrative    Daily coffee consumption: 8 or more cups a day         Social History     Tobacco Use   Smoking Status Former Smoker    Packs/day: 2 50    Years: 37 00    Pack years: 92 50    Types: Cigarettes    Start date: 1970    Quit date:     Years since quittin 3   Smokeless Tobacco Former User   Tobacco Comment    1 ppd for 37 years,  down to 5 cigs a day, is around second hand smoke       Current Medications  Current Outpatient Medications   Medication Sig Dispense Refill    albuterol (2 5 mg/3 mL) 0 083 % nebulizer solution Take 1 vial (2 5 mg total) by nebulization 4 (four) times a day 360 mL 5    albuterol (Ventolin HFA) 90 mcg/act inhaler Inhale 2 puffs every 6 (six) hours as needed for wheezing 1 Inhaler 11    alendronate (FOSAMAX) 70 mg tablet TAKE 1 TABLET BY MOUTH ONE TIME PER WEEK 12 tablet 1    aspirin 81 MG tablet Take 1 tablet by mouth daily      ergocalciferol (VITAMIN D2) 50,000 units TAKE 1 CAPSULE (50,000 UNITS TOTAL) BY MOUTH EVERY 28 DAYS 3 capsule 1    fluticasone-umeclidinium-vilanterol (TRELEGY ELLIPTA) 100-62 5-25 MCG/INH inhaler Inhale 1 puff daily Rinse mouth after use  1 Inhaler 11    guaiFENesin (ROBITUSSIN) 100 MG/5ML oral liquid Take 200 mg by mouth 3 (three) times a day as needed for cough      hydrocodone-chlorpheniramine polistirex (TUSSIONEX) 10-8 mg/5 mL ER suspension Take 5 mL by mouth every 12 (twelve) hours as needed for coughMax Daily Amount: 10 mL 120 mL 0    ipratropium (ATROVENT) 0 02 % nebulizer solution Take 1 vial (0 5 mg total) by nebulization 4 (four) times a day 120 vial 6    pantoprazole (PROTONIX) 40 mg tablet Take by mouth Daily      predniSONE 10 mg tablet Take 1 tablet (10 mg total) by mouth daily (Patient taking differently: Take 20 mg by mouth daily ) 90 tablet 3    rosuvastatin (CRESTOR) 10 MG tablet TAKE 1 TABLET BY MOUTH EVERY DAY 90 tablet 1    docusate sodium (COLACE) 100 mg capsule Take 1 capsule (100 mg total) by mouth 2 (two) times a day for 7 days 14 capsule 0    finasteride (PROSCAR) 5 mg tablet Take 1 tablet (5 mg total) by mouth daily for 7 days 7 tablet 0    neomycin-bacitracin-polymyxin b (NEOSPORIN) ointment Apply topically 2 (two) times a day for 5 days Apply topically twice daily for 5 days to the tip of the penis or catheter as needed for catheter irritation  15 g 0    oxybutynin (DITROPAN) 5 mg tablet Take 1 tablet (5 mg total) by mouth 2 (two) times a day for 4 days 8 tablet 0     No current facility-administered medications for this visit  Allergies  No Known Allergies    Past Medical History, Social History, Family History, medications and allergies were reviewed  Vitals  Vitals:    04/28/21 1556   BP: 122/70   Pulse: 63   Weight: 54 9 kg (121 lb)   Height: 5' 2" (1 575 m)       Physical Exam  Physical Exam  Vitals signs reviewed     Constitutional: Appearance: He is well-developed  HENT:      Head: Normocephalic and atraumatic  Eyes:      Conjunctiva/sclera: Conjunctivae normal    Cardiovascular:      Rate and Rhythm: Normal rate  Pulmonary:      Effort: Pulmonary effort is normal    Skin:     General: Skin is warm and dry  Neurological:      Mental Status: He is alert and oriented to person, place, and time             Results  Lab Results   Component Value Date    PSA 1 6 08/05/2019    PSA 1 8 08/21/2017    PSA 2 3 08/15/2016     Lab Results   Component Value Date    GLUCOSE 90 08/17/2015    CALCIUM 9 0 04/14/2021     08/17/2015    K 4 5 04/14/2021    CO2 29 04/14/2021     04/14/2021    BUN 15 04/14/2021    CREATININE 0 81 04/14/2021     Lab Results   Component Value Date    WBC 8 69 04/14/2021    HGB 12 9 04/14/2021    HCT 41 0 04/14/2021     (H) 04/14/2021     04/14/2021

## 2021-05-20 ENCOUNTER — OFFICE VISIT (OUTPATIENT)
Dept: PULMONOLOGY | Facility: CLINIC | Age: 66
End: 2021-05-20
Payer: COMMERCIAL

## 2021-05-20 VITALS
HEART RATE: 96 BPM | WEIGHT: 120.8 LBS | SYSTOLIC BLOOD PRESSURE: 108 MMHG | TEMPERATURE: 98.2 F | DIASTOLIC BLOOD PRESSURE: 68 MMHG | BODY MASS INDEX: 22.23 KG/M2 | HEIGHT: 62 IN | OXYGEN SATURATION: 95 %

## 2021-05-20 DIAGNOSIS — F17.211 CIGARETTE NICOTINE DEPENDENCE IN REMISSION: ICD-10-CM

## 2021-05-20 DIAGNOSIS — K21.9 GASTROESOPHAGEAL REFLUX DISEASE WITHOUT ESOPHAGITIS: Primary | ICD-10-CM

## 2021-05-20 DIAGNOSIS — J96.11 CHRONIC HYPOXEMIC RESPIRATORY FAILURE (HCC): ICD-10-CM

## 2021-05-20 DIAGNOSIS — J44.9 OSA AND COPD OVERLAP SYNDROME (HCC): ICD-10-CM

## 2021-05-20 DIAGNOSIS — J44.9 CHRONIC OBSTRUCTIVE PULMONARY DISEASE, UNSPECIFIED COPD TYPE (HCC): ICD-10-CM

## 2021-05-20 DIAGNOSIS — G47.33 OSA AND COPD OVERLAP SYNDROME (HCC): ICD-10-CM

## 2021-05-20 PROBLEM — Z23 ENCOUNTER FOR IMMUNIZATION: Status: RESOLVED | Noted: 2021-02-25 | Resolved: 2021-05-20

## 2021-05-20 PROBLEM — Z01.818 PREOPERATIVE CLEARANCE: Status: RESOLVED | Noted: 2021-02-25 | Resolved: 2021-05-20

## 2021-05-20 PROBLEM — G47.9 SLEEP DISORDER: Status: RESOLVED | Noted: 2020-05-28 | Resolved: 2021-05-20

## 2021-05-20 PROBLEM — R91.1 INCIDENTAL LUNG NODULE, LESS THAN OR EQUAL TO 3MM: Status: RESOLVED | Noted: 2018-06-20 | Resolved: 2021-05-20

## 2021-05-20 PROCEDURE — 99214 OFFICE O/P EST MOD 30 MIN: CPT | Performed by: INTERNAL MEDICINE

## 2021-05-20 RX ORDER — ALBUTEROL SULFATE 90 UG/1
2 AEROSOL, METERED RESPIRATORY (INHALATION) EVERY 6 HOURS PRN
Qty: 8.5 G | Refills: 11 | Status: ON HOLD | OUTPATIENT
Start: 2021-05-20 | End: 2022-03-31 | Stop reason: SDUPTHER

## 2021-05-20 NOTE — ASSESSMENT & PLAN NOTE
We briefly discussed endobronchial valves on this visit  They will think about it, at this time will hold on any procedures as he has been through so many procedures recently  He will continue Trelegy 1 puff daily and rinse mouth out after use  Continue DuoNeb 4 times daily  I refilled ProAir rescue inhaler today  He is up-to-date on influenza and pneumonia vaccine  We discussed about COVID vaccine, and I recommend that he receive this  He will think about it, and call back if he would like to schedule  He will be due for lung cancer screening CT August 2021

## 2021-05-20 NOTE — ASSESSMENT & PLAN NOTE
Has been having some increased dyspepsia as well as nocturnal cough and dyspnea  I am concerned this is related to reflux  Will start by taking 2 times prior to bedtime    If this helps, he will talk with Dr Sahra Lozada on Monday about starting a PPI

## 2021-05-20 NOTE — ASSESSMENT & PLAN NOTE
Continue oxygen 2-3 L nasal cannula at rest and with exertion    Goal oxygen saturation greater than 88%

## 2021-05-20 NOTE — PROGRESS NOTES
Assessment:    Gastroesophageal reflux disease  Has been having some increased dyspepsia as well as nocturnal cough and dyspnea  I am concerned this is related to reflux  Will start by taking 2 times prior to bedtime  If this helps, he will talk with Dr Kaleigh Sorensen on Monday about starting a PPI    Chronic obstructive pulmonary disease ([facility] [address] )  We briefly discussed endobronchial valves on this visit  They will think about it, at this time will hold on any procedures as he has been through so many procedures recently  He will continue Trelegy 1 puff daily and rinse mouth out after use  Continue DuoNeb 4 times daily  I refilled ProAir rescue inhaler today  He is up-to-date on influenza and pneumonia vaccine  We discussed about COVID vaccine, and I recommend that he receive this  He will think about it, and call back if he would like to schedule  He will be due for lung cancer screening CT August 2021  KEVIN and COPD overlap syndrome (HCC)  Continue CPAP on a nightly basis    Chronic hypoxemic respiratory failure (HCC)  Continue oxygen 2-3 L nasal cannula at rest and with exertion  Goal oxygen saturation greater than 88%      Plan:    Diagnoses and all orders for this visit:    Gastroesophageal reflux disease without esophagitis    Chronic obstructive pulmonary disease, unspecified COPD type (HCC)  -     albuterol (Ventolin HFA) 90 mcg/act inhaler; Inhale 2 puffs every 6 (six) hours as needed for wheezing    KEVIN and COPD overlap syndrome (HCC)    Chronic hypoxemic respiratory failure (HCC)    Cigarette nicotine dependence in remission  -     CT lung screening program; Future        Follow-up: 3-4 months      HPI:  He has had 2 recent surgeries - bladder inflammation/hyperplasia and grade 1 prostate cancer  Not on any systemic chemotherapy      He continues with significant dyspnea on exertion  No significant cough, wheeze or chest pain during the day  Wakes up at night with dyspnea and cough  Using CPAP at night  Has had some increased dyspepsia    He remains on oxygen at 2-3 LNC    He hasn't gotten the covid vaccine      Review of Systems   Constitutional: Positive for activity change and fatigue  Respiratory: Positive for shortness of breath  Negative for cough and wheezing  Cardiovascular: Negative for chest pain and leg swelling  Gastrointestinal: Negative for nausea and vomiting  Genitourinary: Negative for difficulty urinating  Musculoskeletal: Negative for gait problem         The following portions of the patient's history were reviewed and updated as appropriate: allergies, current medications, past family history, past medical history, past social history, past surgical history and problem list     VITALS:  Vitals:    05/20/21 0929 05/20/21 0932   BP: 108/68    BP Location: Left arm    Patient Position: Sitting    Cuff Size: Standard    Pulse: 96    Temp: 98 2 °F (36 8 °C)    TempSrc: Tympanic    SpO2: 95% 95%   Weight: 54 8 kg (120 lb 12 8 oz)    Height: 5' 2" (1 575 m)        Physical Exam  General:  Patient is awake, alert, non-toxic and in no acute respiratory distress  Neck: No JVD  CV:  Regular, +S1 and S2, No murmurs, gallops or rubs appreciated  Lungs: Greatly diminished breath sounds bilateral without wheeze  Abdomen: Soft, +BS, Non-tender, non-distended  Extremities: No clubbing, cyanosis or edema  Neuro: No focal deficits        Diagnostic Testing:    PFTs: 8/2020 - very severe COPD with air trapping and diffusion impairment  Results:  FEV1/FVC Ratio: 30 %  Forced Vital Capacity: 2 00 L    59 % predicted  FEV1: 0 60 L     22 % predicted        After administration of bronchodilator   FVC: 2 43 L, 72 % predicted, 21 % change  FEV1: 0 66 L, 25 % predicted, 11 % change     Lung volumes by body plethysmography:   Total Lung Capacity 124 % predicted   Residual volume 243 % predicted     DLCO corrected for patients hemoglobin level: 45 %    CBC:  Lab Results   Component Value Date    WBC 8 69 04/14/2021    HGB 12 9 04/14/2021    HCT 41 0 04/14/2021     (H) 04/14/2021     04/14/2021         BMP:   Lab Results   Component Value Date     08/17/2015    K 4 5 04/14/2021     04/14/2021    CO2 29 04/14/2021    ANIONGAP 6 08/17/2015    BUN 15 04/14/2021    CREATININE 0 81 04/14/2021    GLUCOSE 90 08/17/2015    GLUF 104 (H) 04/14/2021    CALCIUM 9 0 04/14/2021    AST 19 11/20/2020    ALT 27 11/20/2020    ALKPHOS 74 11/20/2020    PROT 6 8 08/17/2015    BILITOT 0 60 08/17/2015    EGFR 94 04/14/2021         Joseph Phelps DO

## 2021-05-24 ENCOUNTER — OFFICE VISIT (OUTPATIENT)
Dept: INTERNAL MEDICINE CLINIC | Facility: CLINIC | Age: 66
End: 2021-05-24
Payer: COMMERCIAL

## 2021-05-24 VITALS
SYSTOLIC BLOOD PRESSURE: 122 MMHG | TEMPERATURE: 97.7 F | HEIGHT: 62 IN | BODY MASS INDEX: 23.37 KG/M2 | DIASTOLIC BLOOD PRESSURE: 74 MMHG | OXYGEN SATURATION: 95 % | WEIGHT: 127 LBS | HEART RATE: 96 BPM

## 2021-05-24 DIAGNOSIS — K21.9 GASTROESOPHAGEAL REFLUX DISEASE WITHOUT ESOPHAGITIS: Primary | ICD-10-CM

## 2021-05-24 DIAGNOSIS — N13.8 BPH WITH OBSTRUCTION/LOWER URINARY TRACT SYMPTOMS: ICD-10-CM

## 2021-05-24 DIAGNOSIS — J43.9 PULMONARY EMPHYSEMA, UNSPECIFIED EMPHYSEMA TYPE (HCC): ICD-10-CM

## 2021-05-24 DIAGNOSIS — J96.11 CHRONIC HYPOXEMIC RESPIRATORY FAILURE (HCC): ICD-10-CM

## 2021-05-24 DIAGNOSIS — N40.1 BPH WITH OBSTRUCTION/LOWER URINARY TRACT SYMPTOMS: ICD-10-CM

## 2021-05-24 DIAGNOSIS — C61 PROSTATE CANCER (HCC): ICD-10-CM

## 2021-05-24 PROBLEM — C67.0 MALIGNANT NEOPLASM OF TRIGONE OF URINARY BLADDER (HCC): Status: RESOLVED | Noted: 2019-09-11 | Resolved: 2021-05-24

## 2021-05-24 PROCEDURE — 3725F SCREEN DEPRESSION PERFORMED: CPT | Performed by: INTERNAL MEDICINE

## 2021-05-24 PROCEDURE — 1036F TOBACCO NON-USER: CPT | Performed by: INTERNAL MEDICINE

## 2021-05-24 PROCEDURE — 99214 OFFICE O/P EST MOD 30 MIN: CPT | Performed by: INTERNAL MEDICINE

## 2021-05-24 PROCEDURE — 3008F BODY MASS INDEX DOCD: CPT | Performed by: INTERNAL MEDICINE

## 2021-05-24 RX ORDER — OMEPRAZOLE 20 MG/1
20 CAPSULE, DELAYED RELEASE ORAL DAILY
Qty: 30 CAPSULE | Refills: 0 | Status: SHIPPED | OUTPATIENT
Start: 2021-05-24 | End: 2021-06-16

## 2021-05-24 NOTE — ASSESSMENT & PLAN NOTE
-continue on Trelegy, duonebs q6h, oxygen, prednisone and ventolin prn  -agreeable to getting his COVID vaccination  Advised yearly influenza    -continue follow up with Pulm

## 2021-05-24 NOTE — PROGRESS NOTES
Assessment/Plan:    Problem List Items Addressed This Visit        Digestive    Gastroesophageal reflux disease - Primary     -dyspepsia x 1 week without change in diet, actually has been eliminating acidic and fatty foods  He is on chronic prednisone  -trial of omeprazole x 2 weeks then use prn  Can also continue TUMS for breakthrough  -call if not improved         Relevant Medications    omeprazole (PriLOSEC) 20 mg delayed release capsule       Respiratory    Chronic obstructive pulmonary disease ([facility] [address] )     -continue on Trelegy, duonebs q6h, oxygen, prednisone and ventolin prn  -agreeable to getting his COVID vaccination  Advised yearly influenza  -continue follow up with Pulm         Chronic hypoxemic respiratory failure (HCC)     -continuous 02 at 2-3L via NC            Genitourinary    BPH with obstruction/lower urinary tract symptoms     -s/p TURP  -urine flow has improved         Prostate cancer ([facility] [address] )     -s/p TURP  -cysto q6mos per Urology               Subjective:      Patient ID: Hilda Lorenzo  is a 61 y o  male  HPI  58yo male with COPD, KEVIN, HLD, vitamin D def, macrocytosis without anemia, CM, pulm nodules, LBBB, osteoporosis, BPH and prostate cancer here for follow up care  He has not gone for labs  He is consuming boost   Weight is fluctuating  He has been doing a lot of burping over the past week  Has been taking TUMS which has helped a little bit  He does not chew gum and does not eat fast   He denies heartburn, dysphagia, abdominal pain, n/v/d, melena  He consumes a lot of salad, pasta and admits he has stopped eating a lot of junk food  Had TURP 4/2021 for urinary obstruction  Pathology showed prostate cancer  He denies hematuria    He feels that his breathing is getting worse  He is on continuous oxygen at 2-3L    He has occasional wheezing and has nocturnal nonproductive cough    The following portions of the patient's history were reviewed and updated as appropriate: allergies, current medications, past family history, past medical history, past social history, past surgical history and problem list       Current Outpatient Medications:     albuterol (2 5 mg/3 mL) 0 083 % nebulizer solution, Take 1 vial (2 5 mg total) by nebulization 4 (four) times a day, Disp: 360 mL, Rfl: 5    albuterol (Ventolin HFA) 90 mcg/act inhaler, Inhale 2 puffs every 6 (six) hours as needed for wheezing, Disp: 8 5 g, Rfl: 11    alendronate (FOSAMAX) 70 mg tablet, TAKE 1 TABLET BY MOUTH ONE TIME PER WEEK, Disp: 12 tablet, Rfl: 1    aspirin 81 MG tablet, Take 1 tablet by mouth daily, Disp: , Rfl:     ergocalciferol (VITAMIN D2) 50,000 units, TAKE 1 CAPSULE (50,000 UNITS TOTAL) BY MOUTH EVERY 28 DAYS, Disp: 3 capsule, Rfl: 1    fluticasone-umeclidinium-vilanterol (TRELEGY ELLIPTA) 100-62 5-25 MCG/INH inhaler, Inhale 1 puff daily Rinse mouth after use , Disp: 1 Inhaler, Rfl: 11    guaiFENesin (ROBITUSSIN) 100 MG/5ML oral liquid, Take 200 mg by mouth 3 (three) times a day as needed for cough, Disp: , Rfl:     hydrocodone-chlorpheniramine polistirex (TUSSIONEX) 10-8 mg/5 mL ER suspension, Take 5 mL by mouth every 12 (twelve) hours as needed for coughMax Daily Amount: 10 mL, Disp: 120 mL, Rfl: 0    ipratropium (ATROVENT) 0 02 % nebulizer solution, Take 1 vial (0 5 mg total) by nebulization 4 (four) times a day, Disp: 120 vial, Rfl: 6    predniSONE 10 mg tablet, Take 1 tablet (10 mg total) by mouth daily (Patient taking differently: Take 20 mg by mouth daily ), Disp: 90 tablet, Rfl: 3    rosuvastatin (CRESTOR) 10 MG tablet, TAKE 1 TABLET BY MOUTH EVERY DAY, Disp: 90 tablet, Rfl: 1    docusate sodium (COLACE) 100 mg capsule, Take 1 capsule (100 mg total) by mouth 2 (two) times a day for 7 days, Disp: 14 capsule, Rfl: 0    finasteride (PROSCAR) 5 mg tablet, Take 1 tablet (5 mg total) by mouth daily for 7 days, Disp: 7 tablet, Rfl: 0    neomycin-bacitracin-polymyxin b (NEOSPORIN) ointment, Apply topically 2 (two) times a day for 5 days Apply topically twice daily for 5 days to the tip of the penis or catheter as needed for catheter irritation  , Disp: 15 g, Rfl: 0    omeprazole (PriLOSEC) 20 mg delayed release capsule, Take 1 capsule (20 mg total) by mouth daily, Disp: 30 capsule, Rfl: 0    oxybutynin (DITROPAN) 5 mg tablet, Take 1 tablet (5 mg total) by mouth 2 (two) times a day for 4 days, Disp: 8 tablet, Rfl: 0    Review of Systems   Constitutional: Positive for appetite change and unexpected weight change (fluctuates)  Negative for activity change and fatigue  HENT: Negative for dental problem, postnasal drip, rhinorrhea and trouble swallowing  Respiratory: Positive for cough, chest tightness, shortness of breath and wheezing  Cardiovascular: Negative for chest pain, palpitations and leg swelling  Gastrointestinal: Negative for abdominal pain, constipation, diarrhea, nausea and vomiting  Burping   Genitourinary: Negative for decreased urine volume, difficulty urinating, dysuria and hematuria  Neurological: Negative for dizziness and headaches  Objective:    /74   Pulse 96   Temp 97 7 °F (36 5 °C) (Skin)   Ht 5' 2" (1 575 m)   Wt 57 6 kg (127 lb)   SpO2 95%   BMI 23 23 kg/m²      Physical Exam  Vitals signs reviewed  Constitutional:       Appearance: He is ill-appearing  He is not diaphoretic  HENT:      Nose:      Comments: Wears nasal cannula  Neck:      Musculoskeletal: Neck supple  Cardiovascular:      Rate and Rhythm: Regular rhythm  Tachycardia present  Pulses: Normal pulses  Heart sounds: Normal heart sounds  Pulmonary:      Effort: No respiratory distress  Breath sounds: No wheezing, rhonchi or rales  Abdominal:      General: Abdomen is flat  Bowel sounds are normal  There is no distension  Tenderness: There is no abdominal tenderness  Musculoskeletal:      Right lower leg: No edema  Left lower leg: No edema  Lymphadenopathy:      Cervical: No cervical adenopathy  Skin:     General: Skin is dry  Coloration: Skin is not jaundiced or pale  Neurological:      General: No focal deficit present  Mental Status: He is alert and oriented to person, place, and time  Psychiatric:         Attention and Perception: Attention normal          Mood and Affect: Mood normal          Speech: Speech normal          Behavior: Behavior is cooperative

## 2021-05-24 NOTE — ASSESSMENT & PLAN NOTE
-dyspepsia x 1 week without change in diet, actually has been eliminating acidic and fatty foods  He is on chronic prednisone  -trial of omeprazole x 2 weeks then use prn    Can also continue TUMS for breakthrough  -call if not improved

## 2021-05-25 ENCOUNTER — IMMUNIZATIONS (OUTPATIENT)
Dept: FAMILY MEDICINE CLINIC | Facility: HOSPITAL | Age: 66
End: 2021-05-25

## 2021-05-25 DIAGNOSIS — Z23 ENCOUNTER FOR IMMUNIZATION: Primary | ICD-10-CM

## 2021-05-25 PROCEDURE — 91300 SARS-COV-2 / COVID-19 MRNA VACCINE (PFIZER-BIONTECH) 30 MCG: CPT

## 2021-05-25 PROCEDURE — 0001A SARS-COV-2 / COVID-19 MRNA VACCINE (PFIZER-BIONTECH) 30 MCG: CPT

## 2021-06-15 ENCOUNTER — IMMUNIZATIONS (OUTPATIENT)
Dept: FAMILY MEDICINE CLINIC | Facility: HOSPITAL | Age: 66
End: 2021-06-15

## 2021-06-15 DIAGNOSIS — Z23 ENCOUNTER FOR IMMUNIZATION: Primary | ICD-10-CM

## 2021-06-15 PROCEDURE — 91300 SARS-COV-2 / COVID-19 MRNA VACCINE (PFIZER-BIONTECH) 30 MCG: CPT

## 2021-06-15 PROCEDURE — 0002A SARS-COV-2 / COVID-19 MRNA VACCINE (PFIZER-BIONTECH) 30 MCG: CPT

## 2021-07-17 DIAGNOSIS — E78.2 MIXED HYPERLIPIDEMIA: ICD-10-CM

## 2021-07-19 RX ORDER — ROSUVASTATIN CALCIUM 10 MG/1
TABLET, COATED ORAL
Qty: 90 TABLET | Refills: 1 | Status: SHIPPED | OUTPATIENT
Start: 2021-07-19 | End: 2022-01-07

## 2021-08-10 DIAGNOSIS — J44.9 CHRONIC OBSTRUCTIVE PULMONARY DISEASE, UNSPECIFIED COPD TYPE (HCC): ICD-10-CM

## 2021-08-10 RX ORDER — ALBUTEROL SULFATE 2.5 MG/3ML
2.5 SOLUTION RESPIRATORY (INHALATION) 4 TIMES DAILY
Qty: 360 ML | Refills: 5 | Status: SHIPPED | OUTPATIENT
Start: 2021-08-10 | End: 2021-09-22 | Stop reason: SDUPTHER

## 2021-08-12 ENCOUNTER — HOSPITAL ENCOUNTER (OUTPATIENT)
Dept: RADIOLOGY | Facility: HOSPITAL | Age: 66
Discharge: HOME | End: 2021-08-12
Attending: INTERNAL MEDICINE
Payer: COMMERCIAL

## 2021-08-12 DIAGNOSIS — F17.211 CIGARETTE NICOTINE DEPENDENCE IN REMISSION: ICD-10-CM

## 2021-08-12 PROCEDURE — 71271 CT THORAX LUNG CANCER SCR C-: CPT

## 2021-08-19 ENCOUNTER — APPOINTMENT (OUTPATIENT)
Dept: LAB | Age: 66
End: 2021-08-19
Payer: COMMERCIAL

## 2021-08-19 DIAGNOSIS — E78.00 HYPERCHOLESTEROLEMIA: ICD-10-CM

## 2021-08-19 DIAGNOSIS — E55.9 VITAMIN D DEFICIENCY: ICD-10-CM

## 2021-08-19 DIAGNOSIS — C61 PROSTATE CANCER (HCC): ICD-10-CM

## 2021-08-19 LAB
25(OH)D3 SERPL-MCNC: 49.2 NG/ML (ref 30–100)
ALBUMIN SERPL BCP-MCNC: 3.8 G/DL (ref 3.5–5)
ALP SERPL-CCNC: 76 U/L (ref 46–116)
ALT SERPL W P-5'-P-CCNC: 23 U/L (ref 12–78)
ANION GAP SERPL CALCULATED.3IONS-SCNC: 6 MMOL/L (ref 4–13)
AST SERPL W P-5'-P-CCNC: 24 U/L (ref 5–45)
BILIRUB SERPL-MCNC: 0.74 MG/DL (ref 0.2–1)
BUN SERPL-MCNC: 7 MG/DL (ref 5–25)
CALCIUM SERPL-MCNC: 9 MG/DL (ref 8.3–10.1)
CHLORIDE SERPL-SCNC: 97 MMOL/L (ref 100–108)
CHOLEST SERPL-MCNC: 145 MG/DL (ref 50–200)
CO2 SERPL-SCNC: 28 MMOL/L (ref 21–32)
CREAT SERPL-MCNC: 1.03 MG/DL (ref 0.6–1.3)
GFR SERPL CREATININE-BSD FRML MDRD: 76 ML/MIN/1.73SQ M
GLUCOSE P FAST SERPL-MCNC: 88 MG/DL (ref 65–99)
HDLC SERPL-MCNC: 53 MG/DL
LDLC SERPL CALC-MCNC: 75 MG/DL (ref 0–100)
NONHDLC SERPL-MCNC: 92 MG/DL
POTASSIUM SERPL-SCNC: 4.4 MMOL/L (ref 3.5–5.3)
PROT SERPL-MCNC: 7.4 G/DL (ref 6.4–8.2)
PSA SERPL-MCNC: 1 NG/ML (ref 0–4)
SODIUM SERPL-SCNC: 131 MMOL/L (ref 136–145)
TRIGL SERPL-MCNC: 86 MG/DL

## 2021-08-19 PROCEDURE — 84153 ASSAY OF PSA TOTAL: CPT

## 2021-08-19 PROCEDURE — 80061 LIPID PANEL: CPT

## 2021-08-19 PROCEDURE — 82306 VITAMIN D 25 HYDROXY: CPT

## 2021-08-19 PROCEDURE — 80053 COMPREHEN METABOLIC PANEL: CPT

## 2021-08-19 PROCEDURE — 36415 COLL VENOUS BLD VENIPUNCTURE: CPT

## 2021-08-28 DIAGNOSIS — M81.6 LOCALIZED OSTEOPOROSIS WITHOUT CURRENT PATHOLOGICAL FRACTURE: ICD-10-CM

## 2021-08-28 DIAGNOSIS — E55.9 VITAMIN D DEFICIENCY: ICD-10-CM

## 2021-08-30 ENCOUNTER — TELEPHONE (OUTPATIENT)
Dept: UROLOGY | Facility: MEDICAL CENTER | Age: 66
End: 2021-08-30

## 2021-08-30 RX ORDER — ERGOCALCIFEROL 1.25 MG/1
50000 CAPSULE ORAL
Qty: 3 CAPSULE | Refills: 1 | Status: SHIPPED | OUTPATIENT
Start: 2021-08-30 | End: 2022-02-08

## 2021-08-30 NOTE — TELEPHONE ENCOUNTER
Patient of Dr Kary Sahni in [place]    Patient called stating his appointment was rescheduled for 09/01 but patient is not able to make this appointment  Please review and see how soon patient can be seen  No appointments available within the appropriate time frame

## 2021-08-31 NOTE — TELEPHONE ENCOUNTER
Please advise  Patient was seen by Dr Deny Holly 4/28 s/p TURP  He was to follow up in 3 months with PSA which is 1 0 - which is in system however his note also states periodic Cysto  6-12 months    He was canceled twice in the last month    Would it be appropriate to scheduled for Cysto in October instead of just a follow up

## 2021-09-01 NOTE — TELEPHONE ENCOUNTER
Please notify patient that he is scheduled for a Cysto with Dr Howard Lopez on 10/26 @ 11:00 for 6 month full up surbveilance

## 2021-09-07 DIAGNOSIS — K21.9 GASTROESOPHAGEAL REFLUX DISEASE WITHOUT ESOPHAGITIS: ICD-10-CM

## 2021-09-07 RX ORDER — OMEPRAZOLE 20 MG/1
CAPSULE, DELAYED RELEASE ORAL
Qty: 90 CAPSULE | Refills: 0 | Status: SHIPPED | OUTPATIENT
Start: 2021-09-07 | End: 2021-11-30

## 2021-09-13 DIAGNOSIS — J44.9 CHRONIC OBSTRUCTIVE PULMONARY DISEASE, UNSPECIFIED COPD TYPE (HCC): ICD-10-CM

## 2021-09-22 ENCOUNTER — OFFICE VISIT (OUTPATIENT)
Dept: PULMONOLOGY | Facility: CLINIC | Age: 66
End: 2021-09-22
Payer: COMMERCIAL

## 2021-09-22 VITALS
SYSTOLIC BLOOD PRESSURE: 130 MMHG | HEIGHT: 62 IN | WEIGHT: 127 LBS | HEART RATE: 94 BPM | BODY MASS INDEX: 23.37 KG/M2 | TEMPERATURE: 98.4 F | OXYGEN SATURATION: 94 % | DIASTOLIC BLOOD PRESSURE: 80 MMHG

## 2021-09-22 DIAGNOSIS — J44.9 OSA AND COPD OVERLAP SYNDROME (HCC): Primary | ICD-10-CM

## 2021-09-22 DIAGNOSIS — J96.11 CHRONIC HYPOXEMIC RESPIRATORY FAILURE (HCC): ICD-10-CM

## 2021-09-22 DIAGNOSIS — J44.9 CHRONIC OBSTRUCTIVE PULMONARY DISEASE, UNSPECIFIED COPD TYPE (HCC): ICD-10-CM

## 2021-09-22 DIAGNOSIS — G47.33 OSA AND COPD OVERLAP SYNDROME (HCC): Primary | ICD-10-CM

## 2021-09-22 PROCEDURE — 99214 OFFICE O/P EST MOD 30 MIN: CPT | Performed by: INTERNAL MEDICINE

## 2021-09-22 RX ORDER — ALBUTEROL SULFATE 2.5 MG/3ML
2.5 SOLUTION RESPIRATORY (INHALATION) 4 TIMES DAILY
Qty: 360 ML | Refills: 5 | Status: ON HOLD | OUTPATIENT
Start: 2021-09-22 | End: 2022-03-31 | Stop reason: SDUPTHER

## 2021-09-22 RX ORDER — PREDNISONE 10 MG/1
TABLET ORAL
Qty: 90 TABLET | Refills: 3 | Status: SHIPPED | OUTPATIENT
Start: 2021-09-22 | End: 2022-02-02

## 2021-09-22 NOTE — PROGRESS NOTES
Assessment:    Chronic obstructive pulmonary disease ([facility] )  We talked about the severity of his lung disease, and that I think he may be having a mild exacerbation  Will do prednisone 40 mg daily for 4 days, 30 mg daily for 4 days then go back to prednisone 20 mg daily  He will continue Trelegy daily with albuterol nebulizer treatments 4 times daily  He will continue to remain as active as possible  Will review his case with Dr Eric Novak to discuss endobronchial valves, however with his multiple comorbidities I do not believe he is a great candidate for anesthesia  Chronic hypoxemic respiratory failure ([place] [address] )  He continues to use supplemental oxygen 2-3 L nasal cannula 24/7    KEVIN and COPD overlap syndrome ([place] [address] )  Unfortunately the patient's CPAP machine broke 2 months ago  He has been using oxygen to sleep at night  Will order new machine  He is up to date on covid, pneumonia and flu vaccines    Plan:    Diagnoses and all orders for this visit:    KEVIN and COPD overlap syndrome ([place] [address] )    Chronic obstructive pulmonary disease, unspecified COPD type ([place] [address] )  -     predniSONE 10 mg tablet; Take 4 tablets daily for 4 days, then 3 tablets daily for 4 days, then go back to 2 tablets daily    Chronic hypoxemic respiratory failure (HCC)        Follow-up:  3-4 months      HPI:  He feels like 1 year ago when he did PFTs he started with worsening dyspnea and coughing  His wife notices some increased wheezing at the dinner table as well as today  They report that at night he sometimes wake up short of breath, coughing  He is taking prednisone 20mg daily    He uses Trelegy daily  Albuterol 4 times daily  He is under ongoing surveillance with urology for 2 spots in the bladder, and 1 spot on the prostate  He is scheduled to see them next month  Review of Systems   Constitutional: Positive for activity change  Negative for fever and unexpected weight change  HENT: Positive for congestion and postnasal drip  Respiratory: Positive for cough, shortness of breath and wheezing  Cardiovascular: Positive for chest pain  Gastrointestinal: Negative for nausea and vomiting  Genitourinary: Negative for difficulty urinating and hematuria  Musculoskeletal: Positive for arthralgias  Negative for gait problem  Psychiatric/Behavioral: Positive for sleep disturbance         The following portions of the patient's history were reviewed and updated as appropriate: allergies, current medications, past family history, past medical history, past social history, past surgical history and problem list     VITALS:  Vitals:    09/22/21 0832   BP: 130/80   BP Location: Left arm   Patient Position: Sitting   Pulse: 94   Temp: 98 4 °F (36 9 °C)   SpO2: 94%   Weight: 57 6 kg (127 lb)   Height: 5' 2" (1 575 m)       Physical Exam  General:  Patient is awake, alert, non-toxic and in no acute respiratory distress  Neck: No JVD  CV:  Regular, +S1 and S2, No murmurs, gallops or rubs appreciated  Lungs:  Diminished breath sounds in all lung fields with end expiratory wheeze bilateral  Abdomen: Soft, +BS, Non-tender, non-distended  Extremities: No clubbing, cyanosis or edema  Neuro: No focal deficits        Diagnostic Testing:    PFTs: 9/16/2020  Results:  FEV1/FVC Ratio: 30 %  Forced Vital Capacity: 2 00 L    59 % predicted  FEV1: 0 60 L     22 % predicted        After administration of bronchodilator   FVC: 2 43 L, 72 % predicted, 21 % change  FEV1: 0 66 L, 25 % predicted, 11 % change     Lung volumes by body plethysmography:   Total Lung Capacity 124 % predicted   Residual volume 243 % predicted     DLCO corrected for patients hemoglobin level: 45 %    CBC:  Lab Results   Component Value Date    WBC 8 69 04/14/2021    HGB 12 9 04/14/2021    HCT 41 0 04/14/2021     (H) 04/14/2021     04/14/2021         BMP:   Lab Results   Component Value Date     08/17/2015    K 4 4 08/19/2021    CL 97 (L) 08/19/2021    CO2 28 08/19/2021 ANIONGAP 6 08/17/2015    BUN 7 08/19/2021    CREATININE 1 03 08/19/2021    GLUCOSE 90 08/17/2015    GLUF 88 08/19/2021    CALCIUM 9 0 08/19/2021    AST 24 08/19/2021    ALT 23 08/19/2021    ALKPHOS 76 08/19/2021    PROT 6 8 08/17/2015    BILITOT 0 60 08/17/2015    EGFR 76 08/19/2021         Imaging studies:  I have personally reviewed pertinent films in PACS  CT chest 8/12/21  IMPRESSION:     1   7 mm slight focal patchy density in the left lower lobe posteriorly  This is more nodular from prior exams where there were minimal linear densities  Question inflammatory given the appearance but this can be reassessed on follow up  2   New 3 mm left lower lobe nodule          Ruthy Zaragoza, DO

## 2021-09-22 NOTE — ASSESSMENT & PLAN NOTE
We talked about the severity of his lung disease, and that I think he may be having a mild exacerbation  Will do prednisone 40 mg daily for 4 days, 30 mg daily for 4 days then go back to prednisone 20 mg daily  He will continue Trelegy daily with albuterol nebulizer treatments 4 times daily  He will continue to remain as active as possible  Will review his case with Dr Susie Mccullough to discuss endobronchial valves, however with his multiple comorbidities I do not believe he is a great candidate for anesthesia

## 2021-09-22 NOTE — ASSESSMENT & PLAN NOTE
Unfortunately the patient's CPAP machine broke 2 months ago  He has been using oxygen to sleep at night  Will order new machine

## 2021-10-25 ENCOUNTER — TELEPHONE (OUTPATIENT)
Dept: PULMONOLOGY | Facility: CLINIC | Age: 66
End: 2021-10-25

## 2021-10-25 DIAGNOSIS — J44.1 COPD WITH ACUTE EXACERBATION (HCC): Primary | ICD-10-CM

## 2021-10-25 PROCEDURE — U0003 INFECTIOUS AGENT DETECTION BY NUCLEIC ACID (DNA OR RNA); SEVERE ACUTE RESPIRATORY SYNDROME CORONAVIRUS 2 (SARS-COV-2) (CORONAVIRUS DISEASE [COVID-19]), AMPLIFIED PROBE TECHNIQUE, MAKING USE OF HIGH THROUGHPUT TECHNOLOGIES AS DESCRIBED BY CMS-2020-01-R: HCPCS | Performed by: INTERNAL MEDICINE

## 2021-10-25 PROCEDURE — U0005 INFEC AGEN DETEC AMPLI PROBE: HCPCS | Performed by: INTERNAL MEDICINE

## 2021-10-25 RX ORDER — AZITHROMYCIN 250 MG/1
TABLET, FILM COATED ORAL
Qty: 6 TABLET | Refills: 0 | Status: SHIPPED | OUTPATIENT
Start: 2021-10-25 | End: 2021-10-29

## 2021-10-26 ENCOUNTER — PROCEDURE VISIT (OUTPATIENT)
Dept: UROLOGY | Facility: AMBULATORY SURGERY CENTER | Age: 66
End: 2021-10-26
Payer: COMMERCIAL

## 2021-10-26 VITALS
BODY MASS INDEX: 24.84 KG/M2 | HEIGHT: 62 IN | DIASTOLIC BLOOD PRESSURE: 68 MMHG | SYSTOLIC BLOOD PRESSURE: 132 MMHG | WEIGHT: 135 LBS | HEART RATE: 80 BPM

## 2021-10-26 DIAGNOSIS — C61 PROSTATE CANCER (HCC): Primary | ICD-10-CM

## 2021-10-26 LAB
SL AMB  POCT GLUCOSE, UA: NORMAL
SL AMB LEUKOCYTE ESTERASE,UA: NORMAL
SL AMB POCT BILIRUBIN,UA: NORMAL
SL AMB POCT BLOOD,UA: NORMAL
SL AMB POCT CLARITY,UA: CLEAR
SL AMB POCT COLOR,UA: YELLOW
SL AMB POCT KETONES,UA: NORMAL
SL AMB POCT NITRITE,UA: NORMAL
SL AMB POCT PH,UA: 5
SL AMB POCT SPECIFIC GRAVITY,UA: 1.01
SL AMB POCT URINE PROTEIN: NORMAL
SL AMB POCT UROBILINOGEN: NORMAL

## 2021-10-26 PROCEDURE — 81002 URINALYSIS NONAUTO W/O SCOPE: CPT | Performed by: UROLOGY

## 2021-10-27 ENCOUNTER — HOSPITAL ENCOUNTER (OUTPATIENT)
Dept: RADIOLOGY | Facility: HOSPITAL | Age: 66
Discharge: HOME | End: 2021-10-27
Attending: INTERNAL MEDICINE
Payer: COMMERCIAL

## 2021-10-27 DIAGNOSIS — J44.1 COPD WITH ACUTE EXACERBATION (HCC): ICD-10-CM

## 2021-10-27 PROCEDURE — 71046 X-RAY EXAM CHEST 2 VIEWS: CPT

## 2021-11-01 ENCOUNTER — TELEPHONE (OUTPATIENT)
Dept: PULMONOLOGY | Facility: CLINIC | Age: 66
End: 2021-11-01

## 2021-11-04 ENCOUNTER — TELEPHONE (OUTPATIENT)
Dept: PULMONOLOGY | Facility: CLINIC | Age: 66
End: 2021-11-04

## 2021-11-29 ENCOUNTER — DOCUMENTATION (OUTPATIENT)
Dept: INTERNAL MEDICINE CLINIC | Facility: CLINIC | Age: 66
End: 2021-11-29

## 2021-11-30 DIAGNOSIS — K21.9 GASTROESOPHAGEAL REFLUX DISEASE WITHOUT ESOPHAGITIS: ICD-10-CM

## 2021-11-30 RX ORDER — OMEPRAZOLE 20 MG/1
CAPSULE, DELAYED RELEASE ORAL
Qty: 90 CAPSULE | Refills: 0 | Status: SHIPPED | OUTPATIENT
Start: 2021-11-30 | End: 2022-02-22

## 2021-12-08 ENCOUNTER — OFFICE VISIT (OUTPATIENT)
Dept: CARDIOLOGY CLINIC | Facility: CLINIC | Age: 66
End: 2021-12-08
Payer: MEDICARE

## 2021-12-08 VITALS
DIASTOLIC BLOOD PRESSURE: 76 MMHG | OXYGEN SATURATION: 96 % | HEART RATE: 84 BPM | WEIGHT: 129 LBS | BODY MASS INDEX: 23.74 KG/M2 | SYSTOLIC BLOOD PRESSURE: 132 MMHG | HEIGHT: 62 IN

## 2021-12-08 DIAGNOSIS — I25.10 CORONARY ARTERY DISEASE INVOLVING NATIVE CORONARY ARTERY OF NATIVE HEART WITHOUT ANGINA PECTORIS: ICD-10-CM

## 2021-12-08 DIAGNOSIS — G47.33 OSA AND COPD OVERLAP SYNDROME (HCC): ICD-10-CM

## 2021-12-08 DIAGNOSIS — J44.9 OSA AND COPD OVERLAP SYNDROME (HCC): ICD-10-CM

## 2021-12-08 DIAGNOSIS — I44.7 LEFT BUNDLE-BRANCH BLOCK: ICD-10-CM

## 2021-12-08 DIAGNOSIS — I50.22 CHRONIC SYSTOLIC CONGESTIVE HEART FAILURE (HCC): ICD-10-CM

## 2021-12-08 DIAGNOSIS — J96.11 CHRONIC HYPOXEMIC RESPIRATORY FAILURE (HCC): ICD-10-CM

## 2021-12-08 DIAGNOSIS — I42.0 CARDIOMYOPATHY, DILATED (HCC): Primary | ICD-10-CM

## 2021-12-08 PROCEDURE — 99214 OFFICE O/P EST MOD 30 MIN: CPT | Performed by: INTERNAL MEDICINE

## 2021-12-08 RX ORDER — METOPROLOL SUCCINATE 25 MG/1
25 TABLET, EXTENDED RELEASE ORAL DAILY
Qty: 90 TABLET | Refills: 3 | Status: ON HOLD | OUTPATIENT
Start: 2021-12-08 | End: 2022-03-31 | Stop reason: SDUPTHER

## 2021-12-16 ENCOUNTER — PROCEDURE VISIT (OUTPATIENT)
Dept: UROLOGY | Facility: AMBULATORY SURGERY CENTER | Age: 66
End: 2021-12-16
Payer: COMMERCIAL

## 2021-12-16 VITALS
DIASTOLIC BLOOD PRESSURE: 72 MMHG | WEIGHT: 129 LBS | HEIGHT: 62 IN | BODY MASS INDEX: 23.74 KG/M2 | SYSTOLIC BLOOD PRESSURE: 130 MMHG | HEART RATE: 110 BPM

## 2021-12-16 DIAGNOSIS — C67.0 MALIGNANT NEOPLASM OF TRIGONE OF URINARY BLADDER (HCC): Primary | ICD-10-CM

## 2021-12-16 DIAGNOSIS — C61 PROSTATE CANCER (HCC): ICD-10-CM

## 2021-12-16 DIAGNOSIS — N13.8 BENIGN PROSTATIC HYPERPLASIA WITH URINARY OBSTRUCTION: ICD-10-CM

## 2021-12-16 DIAGNOSIS — N40.1 BENIGN PROSTATIC HYPERPLASIA WITH URINARY OBSTRUCTION: ICD-10-CM

## 2021-12-16 LAB
SL AMB  POCT GLUCOSE, UA: NORMAL
SL AMB LEUKOCYTE ESTERASE,UA: NORMAL
SL AMB POCT BILIRUBIN,UA: NORMAL
SL AMB POCT BLOOD,UA: NORMAL
SL AMB POCT CLARITY,UA: CLEAR
SL AMB POCT COLOR,UA: YELLOW
SL AMB POCT KETONES,UA: NORMAL
SL AMB POCT NITRITE,UA: NORMAL
SL AMB POCT PH,UA: 5
SL AMB POCT SPECIFIC GRAVITY,UA: 1.01
SL AMB POCT URINE PROTEIN: NORMAL
SL AMB POCT UROBILINOGEN: 0.2

## 2021-12-16 PROCEDURE — 52000 CYSTOURETHROSCOPY: CPT | Performed by: UROLOGY

## 2021-12-16 PROCEDURE — 81002 URINALYSIS NONAUTO W/O SCOPE: CPT | Performed by: UROLOGY

## 2022-01-07 DIAGNOSIS — E78.2 MIXED HYPERLIPIDEMIA: ICD-10-CM

## 2022-01-07 RX ORDER — ROSUVASTATIN CALCIUM 10 MG/1
TABLET, COATED ORAL
Qty: 90 TABLET | Refills: 1 | Status: SHIPPED | OUTPATIENT
Start: 2022-01-07 | End: 2022-07-02

## 2022-01-13 ENCOUNTER — OFFICE VISIT (OUTPATIENT)
Dept: PULMONOLOGY | Facility: CLINIC | Age: 67
End: 2022-01-13
Payer: COMMERCIAL

## 2022-01-13 VITALS
HEIGHT: 62 IN | HEART RATE: 104 BPM | SYSTOLIC BLOOD PRESSURE: 128 MMHG | OXYGEN SATURATION: 94 % | BODY MASS INDEX: 22.71 KG/M2 | WEIGHT: 123.4 LBS | DIASTOLIC BLOOD PRESSURE: 90 MMHG | TEMPERATURE: 97.5 F

## 2022-01-13 DIAGNOSIS — J44.9 CHRONIC OBSTRUCTIVE PULMONARY DISEASE, UNSPECIFIED COPD TYPE (HCC): ICD-10-CM

## 2022-01-13 DIAGNOSIS — J44.9 OSA AND COPD OVERLAP SYNDROME (HCC): ICD-10-CM

## 2022-01-13 DIAGNOSIS — G47.33 OSA AND COPD OVERLAP SYNDROME (HCC): ICD-10-CM

## 2022-01-13 DIAGNOSIS — R93.89 ABNORMAL CT OF THE CHEST: ICD-10-CM

## 2022-01-13 DIAGNOSIS — I42.0 CARDIOMYOPATHY, DILATED (HCC): ICD-10-CM

## 2022-01-13 DIAGNOSIS — J96.11 CHRONIC HYPOXEMIC RESPIRATORY FAILURE (HCC): Primary | ICD-10-CM

## 2022-01-13 PROCEDURE — 3008F BODY MASS INDEX DOCD: CPT | Performed by: INTERNAL MEDICINE

## 2022-01-13 PROCEDURE — 1036F TOBACCO NON-USER: CPT | Performed by: INTERNAL MEDICINE

## 2022-01-13 PROCEDURE — 99215 OFFICE O/P EST HI 40 MIN: CPT | Performed by: INTERNAL MEDICINE

## 2022-01-13 NOTE — ASSESSMENT & PLAN NOTE
While he appears euvolemic, and the majority of his symptoms are related to severity of underlying lung disease, I am sure that there is a component of heart disease that contributes in that he cannot compensate as well for his lung disease  He is currently being managed by Dr Nneka Vinson

## 2022-01-13 NOTE — PROGRESS NOTES
Assessment:    Chronic obstructive pulmonary disease ([facility] )  Overall he remains stable  He has end-stage lung disease  I think his increase in nocturnal symptoms is related to the fact that he has not been using a CPAP machine since it is broken  We will work with Bernice Tong to have this replaced asap  Otherwise he will continue on prednisone 20 mg daily, Trelegy inhalation daily DuoNebs q i d  He is up-to-date on pneumonia vaccines  He needs a flu shot and a COVID booster  I have asked him to prioritize the COVID booster today, he can come get a flu shot within 2 weeks  Chronic hypoxemic respiratory failure ([facility] )  He continues on supplemental oxygen to maintain sats greater than 88%  Cardiomyopathy, dilated ([facility] )  While he appears euvolemic, and the majority of his symptoms are related to severity of underlying lung disease, I am sure that there is a component of heart disease that contributes in that he cannot compensate as well for his lung disease  He is currently being managed by Dr Joceline Lockhart  Abnormal CT of the chest  Over the past several years he has had waxing and waning pulmonary nodules  On most recent lung cancer screening CT he had a 7 mm patchy left lower lobe density, unlikely malignant  We discussed the risks and benefits of 6 months versus 12 month repeat imaging  Based off the number of scans he has had, the likelihood that this is malignant, will defer imaging to 12 months  KEVIN and COPD overlap syndrome ([facility] )  He has been without his CPAP machine  Will attempt to find previous sleep study, and reorder CPAP  Plan:    Diagnoses and all orders for this visit:    Chronic hypoxemic respiratory failure ([facility] )    Chronic obstructive pulmonary disease, unspecified COPD type ([facility] )    Cardiomyopathy, dilated ([facility] )    Abnormal CT of the chest    KEVIN and COPD overlap syndrome ([facility] )        Follow-up:  3-4 months      HPI:  He reports that he is not doing well   He has had some days that have been really bad  Despite us treating his exacerbation in November he has not returned to his baseline  His wife continues to report a "growling sound" while he is sleeping  He is unable to lie flat at night because he feels like he has an elephant on his chest  He is wearing his oxygen  His CPAP machine had broken over 6 months ago and he hasn't received a replacement yet  He continues with significant dyspnea with all activity  He has a cough at night  Some phlegm production  No hemoptysis  He is down to prednisone 20mg daily       Review of Systems   Constitutional: Positive for activity change  Negative for fever  HENT: Positive for congestion  Respiratory: Positive for cough and shortness of breath  Cardiovascular: Positive for chest pain  Gastrointestinal: Negative for nausea and vomiting  Musculoskeletal: Negative for gait problem  Skin: Negative for rash  Psychiatric/Behavioral: Positive for dysphoric mood  All other systems reviewed and are negative        The following portions of the patient's history were reviewed and updated as appropriate: allergies, current medications, past family history, past medical history, past social history, past surgical history and problem list     VITALS:  Vitals:    01/13/22 0955   BP: 128/90   BP Location: Left arm   Patient Position: Sitting   Pulse: 104   Temp: 97 5 °F (36 4 °C)   SpO2: 94%   Weight: 56 kg (123 lb 6 4 oz)   Height: 5' 2" (1 575 m)       Physical Exam  General:  Patient is awake, non-toxic and in no acute respiratory distress  Neck: No JVD  CV:  Regular, +S1 and S2, No murmurs, gallops or rubs appreciated  Lungs: Diminished breath sounds bilateral with few scattered wheezes  Abdomen: Soft, +BS, Non-tender, non-distended  Extremities: No clubbing, cyanosis or edema  Neuro: No focal deficits        Diagnostic Testing:      CBC:  Lab Results   Component Value Date    WBC 8 69 04/14/2021    HGB 12 9 04/14/2021    HCT 41 0 04/14/2021     (H) 04/14/2021     04/14/2021         BMP:   Lab Results   Component Value Date     08/17/2015    K 4 4 08/19/2021    CL 97 (L) 08/19/2021    CO2 28 08/19/2021    ANIONGAP 6 08/17/2015    BUN 7 08/19/2021    CREATININE 1 03 08/19/2021    GLUCOSE 90 08/17/2015    GLUF 88 08/19/2021    CALCIUM 9 0 08/19/2021    AST 24 08/19/2021    ALT 23 08/19/2021    ALKPHOS 76 08/19/2021    PROT 6 8 08/17/2015    BILITOT 0 60 08/17/2015    EGFR 76 08/19/2021         Imaging studies:  I have personally reviewed pertinent films in PACS  CT Chest Aug 2021: emphysema, new 7mm patchy LLL density, New 3mm LLL nodule      Geoff Back DO

## 2022-01-13 NOTE — ASSESSMENT & PLAN NOTE
Over the past several years he has had waxing and waning pulmonary nodules  On most recent lung cancer screening CT he had a 7 mm patchy left lower lobe density, unlikely malignant  We discussed the risks and benefits of 6 months versus 12 month repeat imaging  Based off the number of scans he has had, the likelihood that this is malignant, will defer imaging to 12 months

## 2022-01-13 NOTE — ASSESSMENT & PLAN NOTE
Overall he remains stable  He has end-stage lung disease  I think his increase in nocturnal symptoms is related to the fact that he has not been using a CPAP machine since it is broken  We will work with [facility] to have this replaced asap  Otherwise he will continue on prednisone 20 mg daily, Trelegy inhalation daily DuoNebs q i d  He is up-to-date on pneumonia vaccines  He needs a flu shot and a COVID booster  I have asked him to prioritize the COVID booster today, he can come get a flu shot within 2 weeks

## 2022-01-15 ENCOUNTER — IMMUNIZATIONS (OUTPATIENT)
Dept: FAMILY MEDICINE CLINIC | Facility: HOSPITAL | Age: 67
End: 2022-01-15

## 2022-01-15 DIAGNOSIS — Z23 ENCOUNTER FOR IMMUNIZATION: Primary | ICD-10-CM

## 2022-01-15 PROCEDURE — 91300 COVID-19 PFIZER VACC 0.3 ML: CPT

## 2022-01-15 PROCEDURE — 0001A COVID-19 PFIZER VACC 0.3 ML: CPT

## 2022-01-29 DIAGNOSIS — J44.9 CHRONIC OBSTRUCTIVE PULMONARY DISEASE, UNSPECIFIED COPD TYPE (HCC): ICD-10-CM

## 2022-02-01 ENCOUNTER — IMMUNIZATIONS (OUTPATIENT)
Dept: PULMONOLOGY | Facility: CLINIC | Age: 67
End: 2022-02-01
Payer: COMMERCIAL

## 2022-02-01 DIAGNOSIS — Z23 NEED FOR 23-POLYVALENT PNEUMOCOCCAL POLYSACCHARIDE VACCINE: Primary | ICD-10-CM

## 2022-02-01 PROCEDURE — 90673 RIV3 VACCINE NO PRESERV IM: CPT

## 2022-02-01 PROCEDURE — 90471 IMMUNIZATION ADMIN: CPT

## 2022-02-02 RX ORDER — PREDNISONE 10 MG/1
TABLET ORAL
Qty: 90 TABLET | Refills: 3 | Status: SHIPPED | OUTPATIENT
Start: 2022-02-02 | End: 2022-03-31 | Stop reason: HOSPADM

## 2022-02-08 DIAGNOSIS — M81.6 LOCALIZED OSTEOPOROSIS WITHOUT CURRENT PATHOLOGICAL FRACTURE: ICD-10-CM

## 2022-02-08 DIAGNOSIS — E55.9 VITAMIN D DEFICIENCY: ICD-10-CM

## 2022-02-08 RX ORDER — ERGOCALCIFEROL 1.25 MG/1
50000 CAPSULE ORAL
Qty: 3 CAPSULE | Refills: 1 | Status: SHIPPED | OUTPATIENT
Start: 2022-02-08 | End: 2022-07-02

## 2022-02-21 ENCOUNTER — TELEPHONE (OUTPATIENT)
Dept: PULMONOLOGY | Facility: CLINIC | Age: 67
End: 2022-02-21

## 2022-02-21 NOTE — TELEPHONE ENCOUNTER
Called wife and she notified me that [facility] has not contacted them in regards to his POC not working correctly  I asked her what was going on with it and she said that it reads "O2 blocked"  I called [facility] and spoke with Erika Pascaling  She informed me that they will be reaching out to the patient  I called the patient's wife again and told her if they do not hear from [facility] and a few hours to call me again I will reach out again

## 2022-02-22 DIAGNOSIS — K21.9 GASTROESOPHAGEAL REFLUX DISEASE WITHOUT ESOPHAGITIS: ICD-10-CM

## 2022-02-22 RX ORDER — OMEPRAZOLE 20 MG/1
CAPSULE, DELAYED RELEASE ORAL
Qty: 90 CAPSULE | Refills: 0 | Status: SHIPPED | OUTPATIENT
Start: 2022-02-22 | End: 2022-05-19

## 2022-03-22 ENCOUNTER — TELEPHONE (OUTPATIENT)
Dept: PULMONOLOGY | Facility: CLINIC | Age: 67
End: 2022-03-22

## 2022-03-22 DIAGNOSIS — J96.11 CHRONIC HYPOXEMIC RESPIRATORY FAILURE (HCC): Primary | ICD-10-CM

## 2022-03-22 DIAGNOSIS — J43.9 PULMONARY EMPHYSEMA, UNSPECIFIED EMPHYSEMA TYPE (HCC): ICD-10-CM

## 2022-03-22 RX ORDER — AZITHROMYCIN 250 MG/1
TABLET, FILM COATED ORAL
Qty: 6 TABLET | Refills: 0 | Status: SHIPPED | OUTPATIENT
Start: 2022-03-22 | End: 2022-03-31 | Stop reason: HOSPADM

## 2022-03-22 NOTE — TELEPHONE ENCOUNTER
Can you please let patient know  Will order Z-Pardeep- please make sure patient is taking his nebulizer, if mucus continues we can try saline nebulized

## 2022-03-22 NOTE — TELEPHONE ENCOUNTER
Called and spoke with Bernice Salgado  He said 2 days ago he started coughing up green mucous  He said he is having a hard time getting the mucous up so it is hard for him to breathe  He has some chest tightness, wheezing and SOB on exertion  Denies fevers and chills  He is using his nebulizer  He is on prednisone 20 mg daily  He asked if he can take Mucinex and I advised him he can  Please advise if you want to prescribe anything else

## 2022-03-22 NOTE — TELEPHONE ENCOUNTER
Patients wife called, Yari Wright has been coughing up a lot of green and brown phlegm  She isn't sure what to do for him   Please advise

## 2022-03-27 ENCOUNTER — HOSPITAL ENCOUNTER (INPATIENT)
Facility: HOSPITAL | Age: 67
LOS: 4 days | Discharge: HOME HEALTH | DRG: 189 | End: 2022-03-31
Attending: EMERGENCY MEDICINE | Admitting: INTERNAL MEDICINE
Payer: COMMERCIAL

## 2022-03-27 ENCOUNTER — APPOINTMENT (EMERGENCY)
Dept: RADIOLOGY | Facility: HOSPITAL | Age: 67
DRG: 189 | End: 2022-03-27
Payer: COMMERCIAL

## 2022-03-27 DIAGNOSIS — I42.0 CARDIOMYOPATHY, DILATED (HCC): ICD-10-CM

## 2022-03-27 DIAGNOSIS — J44.1 COPD WITH ACUTE EXACERBATION (HCC): ICD-10-CM

## 2022-03-27 DIAGNOSIS — J96.01 ACUTE RESPIRATORY FAILURE WITH HYPOXIA AND HYPERCAPNIA (HCC): Primary | ICD-10-CM

## 2022-03-27 DIAGNOSIS — D75.89 MACROCYTOSIS WITHOUT ANEMIA: ICD-10-CM

## 2022-03-27 DIAGNOSIS — J44.9 COPD, VERY SEVERE (HCC): ICD-10-CM

## 2022-03-27 DIAGNOSIS — I50.22 CHRONIC SYSTOLIC CONGESTIVE HEART FAILURE (HCC): ICD-10-CM

## 2022-03-27 DIAGNOSIS — J44.9 CHRONIC OBSTRUCTIVE PULMONARY DISEASE, UNSPECIFIED COPD TYPE (HCC): ICD-10-CM

## 2022-03-27 DIAGNOSIS — J96.02 ACUTE RESPIRATORY FAILURE WITH HYPOXIA AND HYPERCAPNIA (HCC): Primary | ICD-10-CM

## 2022-03-27 PROBLEM — N40.1 BPH WITH OBSTRUCTION/LOWER URINARY TRACT SYMPTOMS: Chronic | Status: ACTIVE | Noted: 2021-04-13

## 2022-03-27 PROBLEM — C61 PROSTATE CANCER (HCC): Chronic | Status: ACTIVE | Noted: 2021-05-24

## 2022-03-27 PROBLEM — R79.89 ABNORMAL CBC: Status: RESOLVED | Noted: 2019-08-29 | Resolved: 2022-03-27

## 2022-03-27 PROBLEM — Z87.891 FORMER TOBACCO USE: Chronic | Status: ACTIVE | Noted: 2022-03-27

## 2022-03-27 PROBLEM — N13.8 BPH WITH OBSTRUCTION/LOWER URINARY TRACT SYMPTOMS: Chronic | Status: ACTIVE | Noted: 2021-04-13

## 2022-03-27 PROBLEM — K21.9 GASTROESOPHAGEAL REFLUX DISEASE: Chronic | Status: ACTIVE | Noted: 2017-01-05

## 2022-03-27 PROBLEM — J96.22 ACUTE ON CHRONIC RESPIRATORY FAILURE WITH HYPOXIA AND HYPERCAPNIA (HCC): Status: ACTIVE | Noted: 2022-03-27

## 2022-03-27 PROBLEM — J96.21 ACUTE ON CHRONIC RESPIRATORY FAILURE WITH HYPOXIA AND HYPERCAPNIA (HCC): Status: ACTIVE | Noted: 2022-03-27

## 2022-03-27 PROBLEM — R91.8 PULMONARY NODULES/LESIONS, MULTIPLE: Status: ACTIVE | Noted: 2022-03-27

## 2022-03-27 PROBLEM — J96.11 CHRONIC HYPOXEMIC RESPIRATORY FAILURE (HCC): Chronic | Status: ACTIVE | Noted: 2020-11-19

## 2022-03-27 LAB
2HR DELTA HS TROPONIN: -5 NG/L
4HR DELTA HS TROPONIN: -9 NG/L
ALBUMIN SERPL BCP-MCNC: 3.9 G/DL (ref 3.5–5)
ALP SERPL-CCNC: 87 U/L (ref 46–116)
ALT SERPL W P-5'-P-CCNC: 27 U/L (ref 12–78)
ANION GAP SERPL CALCULATED.3IONS-SCNC: 2 MMOL/L (ref 4–13)
APTT PPP: 25 SECONDS (ref 23–37)
AST SERPL W P-5'-P-CCNC: 16 U/L (ref 5–45)
ATRIAL RATE: 101 BPM
BASE EX.OXY STD BLDV CALC-SCNC: 38.5 % (ref 60–80)
BASE EXCESS BLDV CALC-SCNC: 5.1 MMOL/L
BASOPHILS # BLD AUTO: 0.04 THOUSANDS/ΜL (ref 0–0.1)
BASOPHILS NFR BLD AUTO: 0 % (ref 0–1)
BILIRUB SERPL-MCNC: 0.93 MG/DL (ref 0.2–1)
BUN SERPL-MCNC: 19 MG/DL (ref 5–25)
CALCIUM SERPL-MCNC: 9.7 MG/DL (ref 8.3–10.1)
CARDIAC TROPONIN I PNL SERPL HS: 30 NG/L
CARDIAC TROPONIN I PNL SERPL HS: 34 NG/L
CARDIAC TROPONIN I PNL SERPL HS: 39 NG/L
CHLORIDE SERPL-SCNC: 98 MMOL/L (ref 100–108)
CO2 SERPL-SCNC: 36 MMOL/L (ref 21–32)
CREAT SERPL-MCNC: 1.05 MG/DL (ref 0.6–1.3)
EOSINOPHIL # BLD AUTO: 0.13 THOUSAND/ΜL (ref 0–0.61)
EOSINOPHIL NFR BLD AUTO: 1 % (ref 0–6)
ERYTHROCYTE [DISTWIDTH] IN BLOOD BY AUTOMATED COUNT: 13.1 % (ref 11.6–15.1)
FLUAV RNA RESP QL NAA+PROBE: NEGATIVE
FLUBV RNA RESP QL NAA+PROBE: NEGATIVE
GFR SERPL CREATININE-BSD FRML MDRD: 74 ML/MIN/1.73SQ M
GLUCOSE SERPL-MCNC: 147 MG/DL (ref 65–140)
HCO3 BLDV-SCNC: 36.1 MMOL/L (ref 24–30)
HCT VFR BLD AUTO: 48.7 % (ref 36.5–49.3)
HGB BLD-MCNC: 14.8 G/DL (ref 12–17)
IMM GRANULOCYTES # BLD AUTO: 0.05 THOUSAND/UL (ref 0–0.2)
IMM GRANULOCYTES NFR BLD AUTO: 0 % (ref 0–2)
INR PPP: 0.95 (ref 0.84–1.19)
LACTATE SERPL-SCNC: 2 MMOL/L (ref 0.5–2)
LACTATE SERPL-SCNC: 2 MMOL/L (ref 0.5–2)
LYMPHOCYTES # BLD AUTO: 1.93 THOUSANDS/ΜL (ref 0.6–4.47)
LYMPHOCYTES NFR BLD AUTO: 17 % (ref 14–44)
MCH RBC QN AUTO: 31.2 PG (ref 26.8–34.3)
MCHC RBC AUTO-ENTMCNC: 30.4 G/DL (ref 31.4–37.4)
MCV RBC AUTO: 103 FL (ref 82–98)
MONOCYTES # BLD AUTO: 1.24 THOUSAND/ΜL (ref 0.17–1.22)
MONOCYTES NFR BLD AUTO: 11 % (ref 4–12)
NEUTROPHILS # BLD AUTO: 8.17 THOUSANDS/ΜL (ref 1.85–7.62)
NEUTS SEG NFR BLD AUTO: 71 % (ref 43–75)
NRBC BLD AUTO-RTO: 0 /100 WBCS
O2 CT BLDV-SCNC: 8.4 ML/DL
P AXIS: 88 DEGREES
PCO2 BLDV: 85.5 MM HG (ref 42–50)
PH BLDV: 7.24 [PH] (ref 7.3–7.4)
PLATELET # BLD AUTO: 264 THOUSANDS/UL (ref 149–390)
PMV BLD AUTO: 9.9 FL (ref 8.9–12.7)
PO2 BLDV: 25 MM HG (ref 35–45)
POTASSIUM SERPL-SCNC: 4.3 MMOL/L (ref 3.5–5.3)
PR INTERVAL: 148 MS
PROCALCITONIN SERPL-MCNC: 0.05 NG/ML
PROT SERPL-MCNC: 7.5 G/DL (ref 6.4–8.2)
PROTHROMBIN TIME: 12.3 SECONDS (ref 11.6–14.5)
QRS AXIS: 82 DEGREES
QRSD INTERVAL: 114 MS
QT INTERVAL: 338 MS
QTC INTERVAL: 438 MS
RBC # BLD AUTO: 4.75 MILLION/UL (ref 3.88–5.62)
RSV RNA RESP QL NAA+PROBE: NEGATIVE
SARS-COV-2 RNA RESP QL NAA+PROBE: NEGATIVE
SODIUM SERPL-SCNC: 136 MMOL/L (ref 136–145)
T WAVE AXIS: 36 DEGREES
VENTRICULAR RATE: 101 BPM
WBC # BLD AUTO: 11.56 THOUSAND/UL (ref 4.31–10.16)

## 2022-03-27 PROCEDURE — 99285 EMERGENCY DEPT VISIT HI MDM: CPT | Performed by: EMERGENCY MEDICINE

## 2022-03-27 PROCEDURE — 84145 PROCALCITONIN (PCT): CPT | Performed by: EMERGENCY MEDICINE

## 2022-03-27 PROCEDURE — 84484 ASSAY OF TROPONIN QUANT: CPT | Performed by: EMERGENCY MEDICINE

## 2022-03-27 PROCEDURE — 96375 TX/PRO/DX INJ NEW DRUG ADDON: CPT

## 2022-03-27 PROCEDURE — 82805 BLOOD GASES W/O2 SATURATION: CPT | Performed by: EMERGENCY MEDICINE

## 2022-03-27 PROCEDURE — 36415 COLL VENOUS BLD VENIPUNCTURE: CPT | Performed by: EMERGENCY MEDICINE

## 2022-03-27 PROCEDURE — 99291 CRITICAL CARE FIRST HOUR: CPT | Performed by: INTERNAL MEDICINE

## 2022-03-27 PROCEDURE — 80053 COMPREHEN METABOLIC PANEL: CPT | Performed by: EMERGENCY MEDICINE

## 2022-03-27 PROCEDURE — 83605 ASSAY OF LACTIC ACID: CPT | Performed by: EMERGENCY MEDICINE

## 2022-03-27 PROCEDURE — 93010 ELECTROCARDIOGRAM REPORT: CPT | Performed by: INTERNAL MEDICINE

## 2022-03-27 PROCEDURE — 94760 N-INVAS EAR/PLS OXIMETRY 1: CPT

## 2022-03-27 PROCEDURE — 94640 AIRWAY INHALATION TREATMENT: CPT

## 2022-03-27 PROCEDURE — 87040 BLOOD CULTURE FOR BACTERIA: CPT | Performed by: EMERGENCY MEDICINE

## 2022-03-27 PROCEDURE — 99285 EMERGENCY DEPT VISIT HI MDM: CPT

## 2022-03-27 PROCEDURE — 93005 ELECTROCARDIOGRAM TRACING: CPT

## 2022-03-27 PROCEDURE — 85730 THROMBOPLASTIN TIME PARTIAL: CPT | Performed by: EMERGENCY MEDICINE

## 2022-03-27 PROCEDURE — 96365 THER/PROPH/DIAG IV INF INIT: CPT

## 2022-03-27 PROCEDURE — 85610 PROTHROMBIN TIME: CPT | Performed by: EMERGENCY MEDICINE

## 2022-03-27 PROCEDURE — 71045 X-RAY EXAM CHEST 1 VIEW: CPT

## 2022-03-27 PROCEDURE — 94002 VENT MGMT INPAT INIT DAY: CPT

## 2022-03-27 PROCEDURE — 85025 COMPLETE CBC W/AUTO DIFF WBC: CPT | Performed by: EMERGENCY MEDICINE

## 2022-03-27 PROCEDURE — 0241U HB NFCT DS VIR RESP RNA 4 TRGT: CPT | Performed by: EMERGENCY MEDICINE

## 2022-03-27 RX ORDER — ASPIRIN 81 MG/1
81 TABLET, CHEWABLE ORAL ONCE
Status: COMPLETED | OUTPATIENT
Start: 2022-03-27 | End: 2022-03-27

## 2022-03-27 RX ORDER — METHYLPREDNISOLONE SODIUM SUCCINATE 125 MG/2ML
125 INJECTION, POWDER, LYOPHILIZED, FOR SOLUTION INTRAMUSCULAR; INTRAVENOUS ONCE
Status: COMPLETED | OUTPATIENT
Start: 2022-03-27 | End: 2022-03-27

## 2022-03-27 RX ORDER — AZITHROMYCIN 500 MG/1
500 TABLET, FILM COATED ORAL EVERY 24 HOURS
Status: DISCONTINUED | OUTPATIENT
Start: 2022-03-27 | End: 2022-03-29

## 2022-03-27 RX ORDER — METOPROLOL SUCCINATE 25 MG/1
25 TABLET, EXTENDED RELEASE ORAL DAILY
Status: DISCONTINUED | OUTPATIENT
Start: 2022-03-27 | End: 2022-03-31 | Stop reason: HOSPADM

## 2022-03-27 RX ORDER — ERGOCALCIFEROL 1.25 MG/1
50000 CAPSULE ORAL
Status: DISCONTINUED | OUTPATIENT
Start: 2022-03-28 | End: 2022-03-31 | Stop reason: HOSPADM

## 2022-03-27 RX ORDER — BUDESONIDE 0.5 MG/2ML
0.5 INHALANT ORAL
Status: DISCONTINUED | OUTPATIENT
Start: 2022-03-27 | End: 2022-03-31 | Stop reason: HOSPADM

## 2022-03-27 RX ORDER — LEVALBUTEROL 1.25 MG/.5ML
1.25 SOLUTION, CONCENTRATE RESPIRATORY (INHALATION)
Status: DISCONTINUED | OUTPATIENT
Start: 2022-03-27 | End: 2022-03-30 | Stop reason: ALTCHOICE

## 2022-03-27 RX ORDER — SODIUM CHLORIDE FOR INHALATION 0.9 %
3 VIAL, NEBULIZER (ML) INHALATION ONCE
Status: COMPLETED | OUTPATIENT
Start: 2022-03-27 | End: 2022-03-27

## 2022-03-27 RX ORDER — PRAVASTATIN SODIUM 80 MG/1
80 TABLET ORAL
Status: DISCONTINUED | OUTPATIENT
Start: 2022-03-27 | End: 2022-03-31 | Stop reason: HOSPADM

## 2022-03-27 RX ORDER — METHYLPREDNISOLONE SODIUM SUCCINATE 40 MG/ML
40 INJECTION, POWDER, LYOPHILIZED, FOR SOLUTION INTRAMUSCULAR; INTRAVENOUS EVERY 6 HOURS SCHEDULED
Status: DISCONTINUED | OUTPATIENT
Start: 2022-03-27 | End: 2022-03-28

## 2022-03-27 RX ORDER — PANTOPRAZOLE SODIUM 20 MG/1
20 TABLET, DELAYED RELEASE ORAL
Status: DISCONTINUED | OUTPATIENT
Start: 2022-03-27 | End: 2022-03-31 | Stop reason: HOSPADM

## 2022-03-27 RX ORDER — LEVALBUTEROL 1.25 MG/.5ML
1.25 SOLUTION, CONCENTRATE RESPIRATORY (INHALATION)
Status: DISCONTINUED | OUTPATIENT
Start: 2022-03-27 | End: 2022-03-27

## 2022-03-27 RX ADMIN — ISODIUM CHLORIDE 3 ML: 0.03 SOLUTION RESPIRATORY (INHALATION) at 07:47

## 2022-03-27 RX ADMIN — AZITHROMYCIN 500 MG: 500 TABLET, FILM COATED ORAL at 14:42

## 2022-03-27 RX ADMIN — CEFTRIAXONE SODIUM 1000 MG: 10 INJECTION, POWDER, FOR SOLUTION INTRAVENOUS at 08:00

## 2022-03-27 RX ADMIN — METHYLPREDNISOLONE SODIUM SUCCINATE 40 MG: 40 INJECTION, POWDER, FOR SOLUTION INTRAMUSCULAR; INTRAVENOUS at 17:37

## 2022-03-27 RX ADMIN — IPRATROPIUM BROMIDE 1 MG: 0.5 SOLUTION RESPIRATORY (INHALATION) at 07:47

## 2022-03-27 RX ADMIN — BUDESONIDE 0.5 MG: 0.5 INHALANT ORAL at 20:23

## 2022-03-27 RX ADMIN — BUDESONIDE 0.5 MG: 0.5 INHALANT ORAL at 14:20

## 2022-03-27 RX ADMIN — PRAVASTATIN SODIUM 80 MG: 20 TABLET ORAL at 14:42

## 2022-03-27 RX ADMIN — LEVALBUTEROL 1.25 MG: 1.25 SOLUTION, CONCENTRATE RESPIRATORY (INHALATION) at 20:23

## 2022-03-27 RX ADMIN — ENOXAPARIN SODIUM 40 MG: 40 INJECTION SUBCUTANEOUS at 13:36

## 2022-03-27 RX ADMIN — ASPIRIN 81 MG CHEWABLE TABLET 81 MG: 81 TABLET CHEWABLE at 14:42

## 2022-03-27 RX ADMIN — ALBUTEROL SULFATE 10 MG: 2.5 SOLUTION RESPIRATORY (INHALATION) at 07:47

## 2022-03-27 RX ADMIN — IPRATROPIUM BROMIDE 0.5 MG: 0.5 SOLUTION RESPIRATORY (INHALATION) at 20:23

## 2022-03-27 RX ADMIN — LEVALBUTEROL 1.25 MG: 1.25 SOLUTION, CONCENTRATE RESPIRATORY (INHALATION) at 14:20

## 2022-03-27 RX ADMIN — PANTOPRAZOLE SODIUM 20 MG: 20 TABLET, DELAYED RELEASE ORAL at 14:42

## 2022-03-27 RX ADMIN — METHYLPREDNISOLONE SODIUM SUCCINATE 40 MG: 40 INJECTION, POWDER, FOR SOLUTION INTRAMUSCULAR; INTRAVENOUS at 13:36

## 2022-03-27 RX ADMIN — IPRATROPIUM BROMIDE 0.5 MG: 0.5 SOLUTION RESPIRATORY (INHALATION) at 14:20

## 2022-03-27 RX ADMIN — METHYLPREDNISOLONE SODIUM SUCCINATE 125 MG: 125 INJECTION, POWDER, FOR SOLUTION INTRAMUSCULAR; INTRAVENOUS at 07:48

## 2022-03-27 NOTE — ED ATTENDING ATTESTATION
3/27/2022  I, Guanako Tavares DO, saw and evaluated the patient  I have discussed the patient with the resident/non-physician practitioner and agree with the resident's/non-physician practitioner's findings, Plan of Care, and MDM as documented in the resident's/non-physician practitioner's note, except where noted  All available labs and Radiology studies were reviewed  I was present for key portions of any procedure(s) performed by the resident/non-physician practitioner and I was immediately available to provide assistance  At this point I agree with the current assessment done in the Emergency Department  I have conducted an independent evaluation of this patient a history and physical is as follows:    61 yom with worsening productive cough and SOB, hypoxic in 70s on arrival  No improvement w 5 day course of z-tamia from pulm office  Past Medical History:   Diagnosis Date    Arthritis     Bladder mass     Cardiomyopathy [facility])     Chest pain     [facility]     CPAP (continuous positive airway pressure) dependence     Emphysema of lung (HCC)     Hypoxia     nocturnal    Left bundle branch block     Multiple pulmonary nodules     last assessed: 10/12/16    Pneumonia     Sleep apnea     Sleep apnea, obstructive     Smoker      /76   Pulse (!) 114   Resp (!) 32   SpO2 97%   Mild resp distress, A&Ox4, tachy/regular, diffuse wheeze w limited airflow, NROM    cardiopulm eval, supplemental O2, bipap, bcx, antibiotics, admit         ED Course         Critical Care Time  CriticalCare Time  Performed by: Guanako Tavares DO  Authorized by: Guanako Tavares DO     Critical care provider statement:     Critical care time (minutes):  42    Critical care time was exclusive of:  Separately billable procedures and treating other patients and teaching time    Critical care was necessary to treat or prevent imminent or life-threatening deterioration of the following conditions:  Respiratory failure    Critical care was time spent personally by me on the following activities:  Obtaining history from patient or surrogate, development of treatment plan with patient or surrogate, evaluation of patient's response to treatment, examination of patient, review of old charts, re-evaluation of patient's condition, ordering and review of radiographic studies, ordering and review of laboratory studies and ordering and performing treatments and interventions

## 2022-03-27 NOTE — ED PROVIDER NOTES
Final Diagnosis:  1  Acute respiratory failure with hypoxia and hypercapnia (HCC)    2  COPD with acute exacerbation ([facility] [address] )    3  Chronic systolic congestive heart failure ([facility] [address] )    4  Macrocytosis without anemia      ED Course as of 03/27/22 1110   Sun Mar 27, 2022   0732 Starting patient on bipap given his respiratory distress despite oxygen therapy and also giving a Jeralene Alaina   8217 Procedure Note: EKG  Date/Time: 03/27/22 7:37 AM   Interpreted by: Michelle Booker  Indications / Diagnosis: SOB  ECG reviewed by me, the ED Provider: yes   The EKG demonstrates:  Rhythm: sinus tachycardia   Intervals: normal intervals  Axis: normal axis  QRS/Blocks: incomplete LBBB  ST Changes: No acute ST Changes given LBBB morphology, no STD/CARLO        6153 Patient on reassessment looks more comfortable on BiPAP, does have some inspiratory and expiratory wheezes that are heard on auscultation  0810 WBC(!): 11 56   0810 Pulse(!): 114  Meets SIRS criteria, started on ceftriaxone for presumed pneumonia   0852 MICU to evaluate for SD   0919 Patient remains resting comfortably   0923 Procalcitonin: 0 05  Negative, pointing less to infection     Chief Complaint   Patient presents with    Decreased Oxygen Level     pulm hx, wears 3LPM at home arrived not on O2  75%RA  currently 93% on 6LPM  arrived with c/o SOB and increased WOB since yesterday  AOx3    Shortness of Breath       ASSESSMENT + PLAN:   - Nursing note reviewed      1  SOB  -patient with respiratory distress, initially hypoxic placed on 6 L, remained tachypneic with increased work work of breathing, starting albuterol, steroids, ceftriaxone given presumed pneumonia and will place patient on BiPAP to temporize breathing  -VBG to assess CO2, cardiac workup  -patient also to have blood cultures, septic workup given failure of azithromycin, likely needs IV antibiotics  -negative procalcitonin, white blood cell count may be from prednisone use for the past 4-5 days, patient does appear to be much more comfortable  -believe that this is more likely a COPD exacerbation than severe sepsis secondary to pneumonia      Procedures       Final Dispo   Patient was reassessed  Patient was updated with information regarding the tests/imaging done today  I answered all of the patient's and/or family's questions  Patient and/or family vocalizes understanding  After discussion and given the test results, the patient will be admitted to the hospital  Patient and/or family are agreeable to the plan   They will be admitted to [facility] with ICU who will further manage their care in the hospital       Medications   enoxaparin (LOVENOX) subcutaneous injection 40 mg (has no administration in time range)   aspirin chewable tablet 81 mg (has no administration in time range)   ergocalciferol (VITAMIN D2) capsule 50,000 Units (has no administration in time range)   metoprolol succinate (TOPROL-XL) 24 hr tablet 25 mg (has no administration in time range)   pantoprazole (PROTONIX) EC tablet 20 mg (has no administration in time range)   pravastatin (PRAVACHOL) tablet 80 mg (has no administration in time range)   levalbuterol (XOPENEX) inhalation solution 1 25 mg (has no administration in time range)   ipratropium (ATROVENT) 0 02 % inhalation solution 0 5 mg (has no administration in time range)   methylPREDNISolone sodium succinate (Solu-MEDROL) injection 40 mg (has no administration in time range)   azithromycin (ZITHROMAX) tablet 500 mg (has no administration in time range)   budesonide (PULMICORT) inhalation solution 0 5 mg (has no administration in time range)   albuterol inhalation solution 10 mg (10 mg Nebulization Given by Other 3/27/22 0747)     And   ipratropium (ATROVENT) 0 02 % inhalation solution 1 mg (1 mg Nebulization Given by Other 3/27/22 0747)     And   sodium chloride 0 9 % inhalation solution 3 mL (3 mL Nebulization Given by Other 3/27/22 0747)   methylPREDNISolone sodium succinate (Solu-MEDROL) injection 125 mg (125 mg Intravenous Given 3/27/22 0773)   ceftriaxone (ROCEPHIN) 1 g/50 mL in dextrose IVPB (0 mg Intravenous Stopped 3/27/22 0500)     Time reflects when diagnosis was documented in both MDM as applicable and the Disposition within this note     Time User Action Codes Description Comment    3/27/2022  9:32 AM Jose Calhoun Add [J96 01,  J96 02] Acute respiratory failure with hypoxia and hypercapnia ([facility] [address] )     3/27/2022  9:32 AM Kaushik Johnson Add [J44 1] COPD with acute exacerbation ([facility] [address] )     3/27/2022  9:32 AM Jose Calhoun Add [M70 26] Chronic systolic congestive heart failure ([facility] )     3/27/2022  9:32 AM Jose Calhoun Add [D75 89] Macrocytosis without anemia       ED Disposition     ED Disposition Condition Date/Time Comment    Admit Stable Sun Mar 27, 2022 10:55 AM Case was discussed with ICU and the patient's admission status was agreed to be Admission Status: inpatient status to the service of Dr Ni Bradford  Follow-up Information    None       Patient's Medications   Discharge Prescriptions    No medications on file     No discharge procedures on file  Prior to Admission Medications   Prescriptions Last Dose Informant Patient Reported? Taking?    albuterol (2 5 mg/3 mL) 0 083 % nebulizer solution  Self No No   Sig: Take 3 mL (2 5 mg total) by nebulization 4 (four) times a day   albuterol (Ventolin HFA) 90 mcg/act inhaler  Self No No   Sig: Inhale 2 puffs every 6 (six) hours as needed for wheezing   alendronate (FOSAMAX) 70 mg tablet  Self No No   Sig: TAKE 1 TABLET BY MOUTH ONE TIME PER WEEK   aspirin 81 MG tablet  Self Yes No   Sig: Take 1 tablet by mouth daily   azithromycin (ZITHROMAX) 250 mg tablet   No No   Sig: Take 2 tablets today then 1 tablet daily x 4 days   docusate sodium (COLACE) 100 mg capsule  Self No No   Sig: Take 1 capsule (100 mg total) by mouth 2 (two) times a day for 7 days   ergocalciferol (VITAMIN D2) 50,000 units   No No   Sig: TAKE 1 CAPSULE (50,000 UNITS TOTAL) BY MOUTH EVERY 28 DAYS   finasteride (PROSCAR) 5 mg tablet  Self No No   Sig: Take 1 tablet (5 mg total) by mouth daily for 7 days   fluticasone-umeclidinium-vilanterol (TRELEGY ELLIPTA) 100-62 5-25 MCG/INH inhaler  Self No No   Sig: Inhale 1 puff daily Rinse mouth after use  guaiFENesin (ROBITUSSIN) 100 MG/5ML oral liquid  Self Yes No   Sig: Take 200 mg by mouth 3 (three) times a day as needed for cough   hydrocodone-chlorpheniramine polistirex (TUSSIONEX) 10-8 mg/5 mL ER suspension  Self No No   Sig: Take 5 mL by mouth every 12 (twelve) hours as needed for coughMax Daily Amount: 10 mL   ipratropium (ATROVENT) 0 02 % nebulizer solution  Self No No   Sig: Take 2 5 mL (0 5 mg total) by nebulization 4 (four) times a day   metoprolol succinate (TOPROL-XL) 25 mg 24 hr tablet  Self No No   Sig: Take 1 tablet (25 mg total) by mouth daily   neomycin-bacitracin-polymyxin b (NEOSPORIN) ointment  Self No No   Sig: Apply topically 2 (two) times a day for 5 days Apply topically twice daily for 5 days to the tip of the penis or catheter as needed for catheter irritation  omeprazole (PriLOSEC) 20 mg delayed release capsule   No No   Sig: TAKE 1 CAPSULE BY MOUTH EVERY DAY   oxybutynin (DITROPAN) 5 mg tablet  Self No No   Sig: Take 1 tablet (5 mg total) by mouth 2 (two) times a day for 4 days   predniSONE 10 mg tablet   No No   Sig: TAKE 4 TABLETS DAILY FOR 4 DAYS, THEN 3 TABLETS DAILY FOR 4 DAYS, THEN GO BACK TO 2 TABLETS DAILY   rosuvastatin (CRESTOR) 10 MG tablet  Self No No   Sig: TAKE 1 TABLET BY MOUTH EVERY DAY      Facility-Administered Medications: None       History of Present Illness: This is a 61 y o  male PMH significant for cardiomyopathy, emphysema on 3 L at home coming in today with complaint of shortness of breath      Onset:  Acute  Location:  No associated chest pain  Duration:  A week, worse yesterday and today  Radiation: None  Associated Symptoms:  Associated cough that is productive, no fevers  Severity:  Moderate to severe  Attempted Treatments:  Inhalers, azithromycin the past 4-5 days, p o  Steroids for 4-5 days without relief, CPAP at home (night time)  Historical Elements:  Patient has known emphysema, is not a nonsmoker at this time  Also history of cardiomyopathy    Relevant Social History  His wife smokes in the house    - No language barrier    - History obtained from patient and chart   - Reviewed and documented relevant past medical/family/social history  - There are no limitations to the history obtained  - Previous charting was reviewed  Some data reviewed included below for ease of access whether or not it is relevant to this patient encounter  Past Medical, Past Surgical History:    has a past medical history of Arthritis, Bladder mass, Cardiomyopathy ([address] ), Chest pain, COPD (chronic obstructive pulmonary disease) ([address] ), CPAP (continuous positive airway pressure) dependence, Emphysema of lung ([address] ), Hypoxia, Left bundle branch block, Multiple pulmonary nodules, Pneumonia, Sleep apnea, Sleep apnea, obstructive, and Smoker  has a past surgical history that includes pr cystourethroscopy,fulgur <0 5 cm lesn (N/A, 1/3/2020); pr instill anticancer agent in bladder (N/A, 1/3/2020); Cystoscopy; Cystoscopy (02/17/2021); Colonoscopy; pr transurethral elec-surg prostatectom (N/A, 4/13/2021); and pr cystourethroscopy,biopsy (N/A, 4/13/2021)       Allergies:   No Known Allergies    Social and Family History:     Social History     Substance and Sexual Activity   Alcohol Use Not Currently    Comment: rarely     Social History     Tobacco Use   Smoking Status Former Smoker    Packs/day: 2 50    Years: 37 00    Pack years: 92 50    Types: Cigarettes    Start date: 1970    Quit date: 2012    Years since quitting: 10 2   Smokeless Tobacco Former User   Tobacco Comment    1 ppd for 37 years, 2010 down to 5 cigs a day, is around second hand smoke     Social History     Substance and Sexual Activity   Drug Use No Review of Systems:   Review of Systems   Constitutional: Negative for chills, diaphoresis and fever  HENT: Negative  Eyes: Negative  Negative for visual disturbance  Respiratory: Positive for cough, chest tightness and shortness of breath  Cardiovascular: Negative  Negative for chest pain  Gastrointestinal: Negative  Negative for abdominal pain, nausea and vomiting  Endocrine: Negative  Genitourinary: Negative  Musculoskeletal: Negative  Negative for myalgias  Skin: Negative  Negative for rash  Allergic/Immunologic: Negative  Neurological: Negative  Negative for weakness, light-headedness, numbness and headaches  Hematological: Negative  Psychiatric/Behavioral: Negative  All other systems reviewed and are negative  Physical Examination     Vitals:    03/27/22 0900 03/27/22 0930 03/27/22 1000 03/27/22 1030   BP: 109/62 111/67 111/69 105/64   BP Location: Right arm Right arm Right arm Right arm   Pulse: 82 88 82 78   Resp: (!) 32 (!) 32 (!) 34 (!) 34   Temp:       SpO2: 100% 99% 99% 99%     Vitals reviewed by me  Physical Exam  Vitals and nursing note reviewed  Constitutional:       General: He is in acute distress  Comments: Thin, patient in respiratory distress     HENT:      Head: Normocephalic and atraumatic  Eyes:      General: No scleral icterus  Cardiovascular:      Rate and Rhythm: Regular rhythm  Tachycardia present  Pulmonary:      Effort: Tachypnea and respiratory distress present  Comments: Poor air movement  Abdominal:      General: There is no distension  Palpations: Abdomen is soft  Tenderness: There is no abdominal tenderness  Musculoskeletal:         General: Normal range of motion  Cervical back: Normal range of motion  Right lower leg: No edema  Left lower leg: No edema  Skin:     General: Skin is warm and dry  Neurological:      General: No focal deficit present        Mental Status: He is oriented to person, place, and time  Comments: Grossly neuro intact; Able to move all extremities   Psychiatric:      Comments: Anxious            Risk Stratification Tools                ED Course as of 03/27/22 1110   Sun Mar 27, 2022   0732 Starting patient on bipap given his respiratory distress despite oxygen therapy and also giving a Mauro Kin   9894 Procedure Note: EKG  Date/Time: 03/27/22 7:37 AM   Interpreted by: Edgard Rick  Indications / Diagnosis: SOB  ECG reviewed by me, the ED Provider: yes   The EKG demonstrates:  Rhythm: sinus tachycardia   Intervals: normal intervals  Axis: normal axis  QRS/Blocks: incomplete LBBB  ST Changes: No acute ST Changes given LBBB morphology, no STD/CARLO        5133 Patient on reassessment looks more comfortable on BiPAP, does have some inspiratory and expiratory wheezes that are heard on auscultation  0810 WBC(!): 11 56   0810 Pulse(!): 114  Meets SIRS criteria, started on ceftriaxone for presumed pneumonia   0852 MICU to evaluate for SD   0919 Patient remains resting comfortably   0923 Procalcitonin: 0 05  Negative, pointing less to infection     XR chest 1 view portable   ED Interpretation   Abnormal   COPD with Interstitial disease, possible whole left sided pneumonia      Final Result      COPD without acute acute cardiopulmonary disease                    Workstation performed: [id number]           Orders Placed This Encounter   Procedures    Critical Care    COVID/FLU/RSV    Blood culture #1    Blood culture #2    XR chest 1 view portable    CBC and differential    Comprehensive metabolic panel    Blood gas, venous    HS Troponin 0hr (reflex protocol)    Lactic acid    Procalcitonin    Protime-INR    APTT    HS Troponin I 2hr    HS Troponin I 4hr    CBC and differential    Comprehensive metabolic panel    Magnesium    Phosphorus    Procalcitonin    Platelet count    Blood gas, arterial    Diet NPO    Continuous cardiac monitoring    Continuous pulse oximetry    Nasal cannula oxygen    Cardio-Pulmonary Monitoring (Critical Care & Step Down Only)    Vital Signs    Up with assistance    Turn patient    Daily weights    I/O    CAM (ICU) Assessment    Nursing dysphagia assessment    Neuro checks    Insert peripheral IV    Maintain IV access    Apply SCD or Foot pumps    Level 1-Full Code: all life saving measures are indicated    Consult to Case Management    Apply BIPAP    ECG 12 lead    ECG 12 lead    Inpatient Admission    Fall precautions       Labs:     Labs Reviewed   CBC AND DIFFERENTIAL - Abnormal       Result Value Ref Range Status    WBC 11 56 (*) 4 31 - 10 16 Thousand/uL Final    RBC 4 75  3 88 - 5 62 Million/uL Final    Hemoglobin 14 8  12 0 - 17 0 g/dL Final    Hematocrit 48 7  36 5 - 49 3 % Final     (*) 82 - 98 fL Final    MCH 31 2  26 8 - 34 3 pg Final    MCHC 30 4 (*) 31 4 - 37 4 g/dL Final    RDW 13 1  11 6 - 15 1 % Final    MPV 9 9  8 9 - 12 7 fL Final    Platelets 718  928 - 390 Thousands/uL Final    nRBC 0  /100 WBCs Final    Neutrophils Relative 71  43 - 75 % Final    Immat GRANS % 0  0 - 2 % Final    Lymphocytes Relative 17  14 - 44 % Final    Monocytes Relative 11  4 - 12 % Final    Eosinophils Relative 1  0 - 6 % Final    Basophils Relative 0  0 - 1 % Final    Neutrophils Absolute 8 17 (*) 1 85 - 7 62 Thousands/µL Final    Immature Grans Absolute 0 05  0 00 - 0 20 Thousand/uL Final    Lymphocytes Absolute 1 93  0 60 - 4 47 Thousands/µL Final    Monocytes Absolute 1 24 (*) 0 17 - 1 22 Thousand/µL Final    Eosinophils Absolute 0 13  0 00 - 0 61 Thousand/µL Final    Basophils Absolute 0 04  0 00 - 0 10 Thousands/µL Final   COMPREHENSIVE METABOLIC PANEL - Abnormal    Sodium 136  136 - 145 mmol/L Final    Potassium 4 3  3 5 - 5 3 mmol/L Final    Chloride 98 (*) 100 - 108 mmol/L Final    CO2 36 (*) 21 - 32 mmol/L Final    ANION GAP 2 (*) 4 - 13 mmol/L Final    BUN 19  5 - 25 mg/dL Final    Creatinine 1 05  0 60 - 1 30 mg/dL Final    Comment: Standardized to IDMS reference method    Glucose 147 (*) 65 - 140 mg/dL Final    Comment: If the patient is fasting, the ADA then defines impaired fasting glucose as > 100 mg/dL and diabetes as > or equal to 123 mg/dL  Specimen collection should occur prior to Sulfasalazine administration due to the potential for falsely depressed results  Specimen collection should occur prior to Sulfapyridine administration due to the potential for falsely elevated results  Calcium 9 7  8 3 - 10 1 mg/dL Final    AST 16  5 - 45 U/L Final    Comment: Specimen collection should occur prior to Sulfasalazine administration due to the potential for falsely depressed results  ALT 27  12 - 78 U/L Final    Comment: Specimen collection should occur prior to Sulfasalazine and/or Sulfapyridine administration due to the potential for falsely depressed results  Alkaline Phosphatase 87  46 - 116 U/L Final    Total Protein 7 5  6 4 - 8 2 g/dL Final    Albumin 3 9  3 5 - 5 0 g/dL Final    Total Bilirubin 0 93  0 20 - 1 00 mg/dL Final    Comment: Use of this assay is not recommended for patients undergoing treatment with eltrombopag due to the potential for falsely elevated results      eGFR 74  ml/min/1 73sq m Final    Narrative:     [place] guidelines for Chronic Kidney Disease (CKD):     Stage 1 with normal or high GFR (GFR > 90 mL/min/1 73 square meters)    Stage 2 Mild CKD (GFR = 60-89 mL/min/1 73 square meters)    Stage 3A Moderate CKD (GFR = 45-59 mL/min/1 73 square meters)    Stage 3B Moderate CKD (GFR = 30-44 mL/min/1 73 square meters)    Stage 4 Severe CKD (GFR = 15-29 mL/min/1 73 square meters)    Stage 5 End Stage CKD (GFR <15 mL/min/1 73 square meters)  Note: GFR calculation is accurate only with a steady state creatinine   BLOOD GAS, VENOUS - Abnormal    pH, Oscar 7 243 (*) 7 300 - 7 400 Final    pCO2, Oscar 85 5 (*) 42 0 - 50 0 mm Hg Final    pO2, Oscar 25 0 (*) 35 0 - 45 0 mm Hg Final    HCO3, Oscar 36 1 (*) 24 - 30 mmol/L Final    Base Excess, Oscar 5 1  mmol/L Final    O2 Content, Oscar 8 4  ml/dL Final    O2 HGB, VENOUS 38 5 (*) 60 0 - 80 0 % Final   COVID19, INFLUENZA A/B, RSV PCR, [facility] - Normal    SARS-CoV-2 Negative  Negative Final    Comment:      INFLUENZA A PCR Negative  Negative Final    Comment:      INFLUENZA B PCR Negative  Negative Final    Comment:      RSV PCR Negative  Negative Final    Comment:      Narrative:     FOR PEDIATRIC PATIENTS - copy/paste COVID Guidelines URL to browser: [email or web address]  [email or web address]    SARS-CoV-2 assay is a Nucleic Acid Amplification assay intended for the  qualitative detection of nucleic acid from SARS-CoV-2 in nasopharyngeal  swabs  Results are for the presumptive identification of SARS-CoV-2 RNA  Positive results are indicative of infection with SARS-CoV-2, the virus  causing COVID-19, but do not rule out bacterial infection or co-infection  with other viruses  Laboratories within the [place] and its  territories are required to report all positive results to the appropriate  public health authorities  Negative results do not preclude SARS-CoV-2  infection and should not be used as the sole basis for treatment or other  patient management decisions  Negative results must be combined with  clinical observations, patient history, and epidemiological information  This test has not been FDA cleared or approved  This test has been authorized by FDA under an Emergency Use Authorization  (EUA)  This test is only authorized for the duration of time the  declaration that circumstances exist justifying the authorization of the  emergency use of an in vitro diagnostic tests for detection of SARS-CoV-2  virus and/or diagnosis of COVID-19 infection under section 564(b)(1) of  the Act, 21 U  S C  814VTW-4(R)(0), unless the authorization is terminated  or revoked sooner   The test has been validated but independent review by FDA  and CLIA is pending  Test performed using [employer] GeneXpert: This RT-PCR assay targets N2,  a region unique to SARS-CoV-2  A conserved region in the E-gene was chosen  for pan-Sarbecovirus detection which includes SARS-CoV-2  HS TROPONIN I 0HR - Normal    hs TnI 0hr 39  "Refer to ACS Flowchart"- see link ng/L Final    Comment:                                              Initial (time 0) result  If >=50 ng/L, Myocardial injury suggested ;  Type of myocardial injury and treatment strategy  to be determined  If 5-49 ng/L, a delta result at 2 hours and or 4 hours will be needed to further evaluate  If <4 ng/L, and chest pain has been >3 hours since onset, patient may qualify for discharge based on the HEART score in the ED  If <5 ng/L and <3hours since onset of chest pain, a delta result at 2 hours will be needed to further evaluate  HS Troponin 99th Percentile URL of a Health Population=12 ng/L with a 95% Confidence Interval of 8-18 ng/L  Second Troponin (time 2 hours)  If calculated delta >= 20 ng/L,  Myocardial injury suggested ; Type of myocardial injury and treatment strategy to be determined  If 5-49 ng/L and the calculated delta is 5-19 ng/L, consult medical service for evaluation  Continue evaluation for ischemia on ecg and other possible etiology and repeat hs troponin at 4 hours  If delta is <5 ng/L at 2 hours, consider discharge based on risk stratification via the HEART score (if in ED), or MARK risk score in IP/Observation  HS Troponin 99th Percentile URL of a Health Population=12 ng/L with a 95% Confidence Interval of 8-18 ng/L  LACTIC ACID, PLASMA - Normal    LACTIC ACID 2 0  0 5 - 2 0 mmol/L Final    Narrative:     Result may be elevated if tourniquet was used during collection     PROCALCITONIN TEST - Normal    Procalcitonin 0 05  <=0 25 ng/ml Final    Comment: Suspected Lower Respiratory Tract Infection (LRTI):  - LESS than or EQUAL to 0 25 ng/mL:   low likelihood for bacterial LRTI; antibiotics DISCOURAGED   - GREATER than 0 25 ng/mL:   increased likelihood for bacterial LRTI; antibiotics ENCOURAGED  Suspected Sepsis:  - Strongly consider initiating antibiotics in ALL UNSTABLE patients  - LESS than or EQUAL to 0 5 ng/mL:   low likelihood for bacterial sepsis; antibiotics DISCOURAGED   - GREATER than 0 5 ng/mL:   increased likelihood for bacterial sepsis; antibiotics ENCOURAGED   - GREATER than 2 ng/mL:   high risk for severe sepsis / septic shock; antibiotics strongly ENCOURAGED  Decisions on antibiotic use should not be based solely on Procalcitonin (PCT) levels  If PCT is low but uncertainty exists with stopping antibiotics, repeat PCT in 6-24 hours to confirm the low level  If antibiotics are administered (regardless if initial PCT was high or low), repeat PCT every 1-2 days to consider early antibiotic cessation (when GREATER than 80% decrease from the peak OR when PCT drops below designated cutoffs, whichever comes first), so long as the infection is NOT one that typically requires prolonged treatment durations (e g , bone/joint infections, endocarditis, Staph  aureus bacteremia)      Situations of FALSE-POSITIVE Procalcitonin values:  1) Newborns < 67 hours old  2) Massive stress from severe trauma / burns, major surgery, acute pancreatitis, cardiogenic / hemorrhagic shock, sickle cell crisis, or other organ perfusion abnormalities  3) Malaria and some Candidal infections  4) Treatment with agents that stimulate cytokines (e g , OKT3, anti-lymphocyte globulins, alemtuzumab, IL-2, granulocyte transfusion [NOT GCSFs])  5) Chronic renal disease causes elevated baseline levels (consider GREATER than 0 75 ng/mL as an abnormal cut-off); initiating HD/CRRT may cause transient decreases  6) Paraneoplastic syndromes from medullary thyroid or SCLC, some forms of vasculitis, and acute afwsf-pn-cyne disease    Situations of FALSE-NEGATIVE Procalcitonin values:  1) Too early in clinical course for PCT to have reached its peak (may repeat in 6-24 hours to confirm low level)  2) Localized infection WITHOUT systemic (SIRS / sepsis) response (e g , an abscess, osteomyelitis, cystitis)  3) Mycobacteria (e g , Tuberculosis, MAC)  4) Cystic fibrosis exacerbations     PROTIME-INR - Normal    Protime 12 3  11 6 - 14 5 seconds Final    INR 0 95  0 84 - 1 19 Final   APTT - Normal    PTT 25  23 - 37 seconds Final    Comment: Therapeutic Heparin Range =  60-90 seconds   HS TROPONIN I 2HR - Normal    hs TnI 2hr 34  "Refer to ACS Flowchart"- see link ng/L Final    Comment:                                              Initial (time 0) result  If >=50 ng/L, Myocardial injury suggested ;  Type of myocardial injury and treatment strategy  to be determined  If 5-49 ng/L, a delta result at 2 hours and or 4 hours will be needed to further evaluate  If <4 ng/L, and chest pain has been >3 hours since onset, patient may qualify for discharge based on the HEART score in the ED  If <5 ng/L and <3hours since onset of chest pain, a delta result at 2 hours will be needed to further evaluate  HS Troponin 99th Percentile URL of a Health Population=12 ng/L with a 95% Confidence Interval of 8-18 ng/L  Second Troponin (time 2 hours)  If calculated delta >= 20 ng/L,  Myocardial injury suggested ; Type of myocardial injury and treatment strategy to be determined  If 5-49 ng/L and the calculated delta is 5-19 ng/L, consult medical service for evaluation  Continue evaluation for ischemia on ecg and other possible etiology and repeat hs troponin at 4 hours  If delta is <5 ng/L at 2 hours, consider discharge based on risk stratification via the HEART score (if in ED), or MARK risk score in IP/Observation  HS Troponin 99th Percentile URL of a Health Population=12 ng/L with a 95% Confidence Interval of 8-18 ng/L      Delta 2hr hsTnI -5  <20 ng/L Final   BLOOD CULTURE    Blood Culture Received in Microbiology Lab  Culture in Progress  Preliminary   BLOOD CULTURE    Blood Culture Received in Microbiology Lab  Culture in Progress  Preliminary   HS TROPONIN I 4HR   PLATELET COUNT   BLOOD GAS, ARTERIAL       Imaging:   No results found  Final Diagnosis:  1  Acute respiratory failure with hypoxia and hypercapnia (HCC)    2  COPD with acute exacerbation ([facility] )    3  Chronic systolic congestive heart failure ([facility] )    4  Macrocytosis without anemia        Code Status: Level 1 - Full Code    Portions of the record may have been created with voice recognition software  Occasional wrong word or "sound a like" substitutions may have occurred due to the inherent limitations of voice recognition software  Read the chart carefully and recognize, using context, where substitutions have occurred      Electronically signed by:  Keo Finney, PGY 3, MD Braulio Skinner MD  03/27/22 9021

## 2022-03-27 NOTE — SEPSIS NOTE
Believe that this is more likely COPD exacerbation than sepsis 2/2 pneumonia given no consolidation and normal procalcitonin  Elevated WBC may be 2/2 to steroid use the previous couple days  Sepsis Note   Rodrigo Adams  61 y o  male MRN: [id number]  Unit/Bed#: ED 24 Encounter: [id number]       qSOFA     [address] [facility] [address] Name 03/27/22 0800 03/27/22 0732 03/27/22 0722          Altered mental status GCS < 15 -- 0 --      Respiratory Rate > / =22 1 -- 1      Systolic BP < / =426 0 -- 0      Q Sofa Score 1 1 1                 Initial Sepsis Screening     Row Name 03/27/22 4250                Is the patient's history suggestive of a new or worsening infection? Yes (Proceed)  LIFESCAPE        Suspected source of infection pneumonia  LIFESCAPE        Are two or more of the following signs & symptoms of infection both present and new to the patient? Yes (Proceed)  -JH        Indicate SIRS criteria Tachycardia > 90 bpm;Tachypnea > 20 resp per min;Leukocytosis (WBC > 33614 IJL)  LIFESCAPE        If the answer is yes to both questions, suspicion of sepsis is present --        If severe sepsis is present AND tissue hypoperfusion perists in the hour after fluid resuscitation or lactate > 4, the patient meets criteria for SEPTIC SHOCK --        Are any of the following organ dysfunction criteria present within 6 hours of suspected infection and SIRS criteria that are NOT considered to be chronic conditions?  Yes  LIFESCAPE        Organ dysfunction Acute respiratory failure (new need for invasive or non-invasive mechanical ventilation)  -JH        Date of presentation of severe sepsis 03/27/22  LIFESCAPE        Time of presentation of severe sepsis 0923  LIFESCAPE        Tissue hypoperfusion persists in the hour after crystalloid fluid administration, evidenced, by either: --        Was hypotension present within one hour of the conclusion of crystalloid fluid administration? --        Date of presentation of septic shock --        Time of presentation of septic shock -- User Key  (r) = Recorded By, (t) = Taken By, (c) = Cosigned By    [address] Name Provider Type    Daphne Delatorre MD Physician

## 2022-03-27 NOTE — PLAN OF CARE
Problem: PAIN - ADULT  Goal: Verbalizes/displays adequate comfort level or baseline comfort level  Description: Interventions:  - Encourage patient to monitor pain and request assistance  - Assess pain using appropriate pain scale  - Administer analgesics based on type and severity of pain and evaluate response  - Implement non-pharmacological measures as appropriate and evaluate response  - Consider cultural and social influences on pain and pain management  - Notify physician/advanced practitioner if interventions unsuccessful or patient reports new pain  Outcome: Progressing     Problem: INFECTION - ADULT  Goal: Absence or prevention of progression during hospitalization  Description: INTERVENTIONS:  - Assess and monitor for signs and symptoms of infection  - Monitor lab/diagnostic results  - Monitor all insertion sites, i e  indwelling lines, tubes, and drains  - Monitor endotracheal if appropriate and nasal secretions for changes in amount and color  - [place] appropriate cooling/warming therapies per order  - Administer medications as ordered  - Instruct and encourage patient and family to use good hand hygiene technique  - Identify and instruct in appropriate isolation precautions for identified infection/condition  Outcome: Progressing  Goal: Absence of fever/infection during neutropenic period  Description: INTERVENTIONS:  - Monitor WBC    Outcome: Progressing     Problem: SAFETY ADULT  Goal: Patient will remain free of falls  Description: INTERVENTIONS:  - Educate patient/family on patient safety including physical limitations  - Instruct patient to call for assistance with activity   - Consult OT/PT to assist with strengthening/mobility   - Keep Call bell within reach  - Keep bed low and locked with side rails adjusted as appropriate  - Keep care items and personal belongings within reach  - Initiate and maintain comfort rounds  - Make Fall Risk Sign visible to staff  - Offer Toileting every  Hours, in advance of need  - Initiate/Maintain alarm  - Obtain necessary fall risk management equipment:   - Apply yellow socks and bracelet for high fall risk patients  - Consider moving patient to room near nurses station  Outcome: Progressing  Goal: Maintain or return to baseline ADL function  Description: INTERVENTIONS:  -  Assess patient's ability to carry out ADLs; assess patient's baseline for ADL function and identify physical deficits which impact ability to perform ADLs (bathing, care of mouth/teeth, toileting, grooming, dressing, etc )  - Assess/evaluate cause of self-care deficits   - Assess range of motion  - Assess patient's mobility; develop plan if impaired  - Assess patient's need for assistive devices and provide as appropriate  - Encourage maximum independence but intervene and supervise when necessary  - Involve family in performance of ADLs  - Assess for home care needs following discharge   - Consider OT consult to assist with ADL evaluation and planning for discharge  - Provide patient education as appropriate  Outcome: Progressing  Goal: Maintains/Returns to pre admission functional level  Description: INTERVENTIONS:  - Perform BMAT or MOVE assessment daily    - Set and communicate daily mobility goal to care team and patient/family/caregiver  - Collaborate with rehabilitation services on mobility goals if consulted  - Perform Range of Motion 3 times a day  - Reposition patient every 2 hours    - Dangle patient 3 times a day  - Stand patient 2 times a day  - Ambulate patient 3 times a day  - Out of bed to chair 3 times a day   - Out of bed for meals 3 times a day  - Out of bed for toileting  - Record patient progress and toleration of activity level   Outcome: Progressing     Problem: DISCHARGE PLANNING  Goal: Discharge to home or other facility with appropriate resources  Description: INTERVENTIONS:  - Identify barriers to discharge w/patient and caregiver  - Arrange for needed discharge resources and transportation as appropriate  - Identify discharge learning needs (meds, wound care, etc )  - Arrange for interpretive services to assist at discharge as needed  - Refer to Case Management Department for coordinating discharge planning if the patient needs post-hospital services based on physician/advanced practitioner order or complex needs related to functional status, cognitive ability, or social support system  Outcome: Progressing     Problem: Knowledge Deficit  Goal: Patient/family/caregiver demonstrates understanding of disease process, treatment plan, medications, and discharge instructions  Description: Complete learning assessment and assess knowledge base    Interventions:  - Provide teaching at level of understanding  - Provide teaching via preferred learning methods  Outcome: Progressing     Problem: RESPIRATORY - ADULT  Goal: Achieves optimal ventilation and oxygenation  Description: INTERVENTIONS:  - Assess for changes in respiratory status  - Assess for changes in mentation and behavior  - Position to facilitate oxygenation and minimize respiratory effort  - Oxygen administered by appropriate delivery if ordered  - Initiate smoking cessation education as indicated  - Encourage broncho-pulmonary hygiene including cough, deep breathe, Incentive Spirometry  - Assess the need for suctioning and aspirate as needed  - Assess and instruct to report SOB or any respiratory difficulty  - Respiratory Therapy support as indicated  Outcome: Progressing

## 2022-03-27 NOTE — H&P
H&P Exam - [address]  61 y o  male MRN: [id number]  Unit/Bed#: ED 24 Encounter: [id number]      -------------------------------------------------------------------------------------------------------------  Chief Complaint: SOB    History of Present Illness   HX and PE limited by: Patient lethargy, respiratory distress    Ilda Vance  is a 61 y o  male who presents with past medical history of chronic hypoxic respiratory failure (on 3-4 L supplemental oxygen at home), severe COPD (FEV1 0 86, 27% predicted), pulmonary nodules noted on CT in August of 2021, heart failure reduced ejection fraction (EF 45-50%), KEVIN, hyperlipidemia, GERD,  Fasting glucose, osteoarthritis, osteoporosis, vitamin-D deficiency, 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, a history of prostate cancer presented to [address] [place] [address] on 03/27/2022 with chief complaint of shortness of breath  History is obtained mostly from his the patient's wife who was at bedside  Patient was arousable but was tired and did not feel a speaking  She states over the past week or so patient has been experiencing increasing shortness of breath, wheezing and sputum production  States that he called his outpatient pulmonologist Dr Luis Fernando Walker, who's PA prescribed him short course of azithromycin starting on 3/27  She states despite use of his nebulizer, steroids and azithromycin he progressively continued to deteriorate  States that he woke this morning was feeling significantly short of breath with increased work of breathing  He informed his wife that he needed to come to the emergency department which greatly concerned her and brought him to the emergency department  In the emergency department he was found to be significant respiratory distress and saturating 75% on room air  He is placed on 6 L nasal cannula with improvement in his oxygen saturation of 93%    He remained tachypneic with increased work of breathing and was started on BiPAP therapy with albuterol, steroids and ceftriaxone  Blood work was obtained which revealed pH 7 24, pCO2 of 85 5, serum bicarbonate of 36, negative troponins, lactic acid 2 0, procalcitonin 0 05, WBC count 11 56, negative influenza A/B, negative RSV, negative SARS-CoV-2  He was afebrile, tachycardic to 114 on admission, tachypneic to 34, on admission normotensive, saturating near 100% on BiPAP  Critical Care has been asked to evaluate regarding acute on chronic respiratory failure due to hypoxia and hypercapnia requiring continuous BiPAP therapy in the setting of acute exacerbation of COPD  History obtained from wife at bedside  -------------------------------------------------------------------------------------------------------------  Assessment:    Acute on chronic respiratory failure with hypercapnia and hypoxia  Acute exacerbation of COPD  KEVIN and COPD overlap not on therapy due to device malfunction  Pulmonary nodules, multiple  Former tobacco use  Chronic heart failure with preserved ejection fraction  Cardiomyopathy  Coronary artery disease  Hyperlipidemia  GERD  Osteoarthritis  Osteoporosis  Vitamin-D deficiency  History of prostate cancer        Plan:    Neuro:   Patient was lethargic on examination  He was able to be woken and answer questions but he preferred to rest and keep his eyes closed  Likely was having significant difficulties with respiration for quite some time and is exhausted from his work of breathing  He seems to be calm and comfortable and reportedly AO x3 in the ED  The this point not think that he is acutely encephalopathic  There is a high risk for delirium and we will add delirium precautions  CV:   Hemodynamically appropriate this time  Tachycardia is likely secondary to his increased work of breathing and respiratory failure  Will resume outpatient regimen  Does follow with Cardiology as an outpatient    Will resume his antiplatelet and statin regimen  He does not have a history of hypertension and blood pressures have been the low side of normal   He is only on metoprolol succinate 25 mg       Pulm:  He has severe COPD with acute on chronic hypercapnic and hypoxic respiratory failure  He was previously diagnosed with KEVIN and was placed on CPAP  However his CPAP device had malfunctioned he has been off of it for the better part of the last 2 months  Therefore, he likely has acute on chronic hypercapnic hypoxic respiratory failure due to his how untreated KEVIN and chronic severe COPD  He has appropriately been initiated on BiPAP therapy  Will increase the settings to 16 IPAP/ 5 EPAP with 50% FiO2  Will repeat an ABG  Firs acute COPD exacerbation mm she him on around the clock bronchodilators  He was started on Xopenex and Atrovent q 6 hours  Additionally will add budesonide b i d  As he is on Trelegy at home  Will additionally add IV Solu-Medrol 40 mg Q 8 hours  Will additionally continue him on 500 mg of azithromycin q day for 3 days for his acute COPD exacerbation  Will attempt to wean off BiPAP later in the day  Can be placed on high-flow nasal cannula or mid flow nasal cannula  Goal SpO2 of 88-92%  GI:   He takes omeprazole 20 mg at home  Will start him on 20 mg of pantoprazole while admitted  Otherwise no active issues  :   Past history of prostate cancer  The wife did tell me that he does have a history of bladder cancer    I believe she was mistaken  No document history of bladder cancer  Otherwise there are no active issues  Continue to monitor ins and outs and urine output  F/E/N:   Given his chronic systolic heart failure will judiciously use IV fluids  Monitor his electrolytes and supplement as needed  Goal potassium greater than 4, phosphorus greater than 3, magnesium greater than 2  Will make him NPO well placed on continues BiPAP  Will attempt to wean down BiPAP later and resume diet at that time  Heme/Onc:   Will initiate DVT prophylaxis Lovenox 40 mg subcutaneously  Past history of prostate cancer noted  Pulmonary nodules noted as well  Looks like in discussion with his outpatient pulmonologist it was recommended that he receive a repeat CT scan in 1 year from his last low-dose CT scan which was within in August 2021  At this point not see a clear indication for repeating a chest CT scan and will hold off and defer to outpatient pulmonology  Endo:   Appears he has history of vitamin-D deficiency and is on vitamin-D supplementation  Will resume that  He also has a history of osteoporosis likely secondary to chronic steroid use from frequent COPD exacerbations  We will hold his alendronate while he is acutely ill  Consider resuming once he is no longer critically ill  Monitor blood sugars with a goal blood glucose between 140-180  ID:   Procalcitonin less than 0 05  No obvious consolidation on chest x-ray image  There is an area of some consolidation in the right lower lung field however in review of past imaging it appears that this is been there for quite some time  Doubt the acutely has a pneumonia  He does have an elevation in his white blood cell count likely this is due to chronic steroid use  He is afebrile and has had minimal signs of symptoms of an acute infection  For now will monitor off antibiotics  He has been placed on azithromycin to treat any tracheobronchitis and may be associated with his COPD exacerbation  Repeat procalcitonin tomorrow morning  MSK/Skin:   Bed rest on continues BiPAP  Once able to be tolerated off BiPAP we will advance activity as tolerated  He will require pressure ulcer prophylaxis with frequent repositioning      Disposition: Admit to Stepdown Level 1  Code Status: Level 1 - Full Code  --------------------------------------------------------------------------------------------------------------  Review of Systems   Unable to perform ROS: Acuity of condition   Constitutional: Negative for chills and fever  HENT: Negative for postnasal drip, rhinorrhea and sore throat  Eyes: Negative for visual disturbance  Respiratory: Positive for cough, chest tightness, shortness of breath and wheezing  Cardiovascular: Negative for chest pain and palpitations  Gastrointestinal: Negative for abdominal pain, diarrhea, nausea and vomiting  Genitourinary: Negative for flank pain, frequency and urgency  Musculoskeletal: Negative for arthralgias, back pain and myalgias  Neurological: Negative for dizziness, seizures, syncope, light-headedness, numbness and headaches  Psychiatric/Behavioral: Positive for confusion  Review of systems was unable to be performed secondary to patient lethargy and respiratory distress    Physical Exam  Constitutional:       General: He is not in acute distress  Appearance: He is ill-appearing  He is not toxic-appearing  Interventions: He is not intubated  HENT:      Head: Normocephalic and atraumatic  Mouth/Throat:      Mouth: Mucous membranes are moist       Pharynx: Oropharynx is clear  Neck:      Vascular: No JVD  Trachea: No tracheal deviation  Cardiovascular:      Rate and Rhythm: Normal rate and regular rhythm  Heart sounds: No murmur heard  No friction rub  No gallop  Pulmonary:      Effort: No tachypnea, accessory muscle usage or respiratory distress  He is not intubated  Breath sounds: Decreased breath sounds and wheezing present  No rhonchi or rales  Chest:      Chest wall: No tenderness  Abdominal:      Palpations: Abdomen is soft  There is no hepatomegaly  Tenderness: There is no abdominal tenderness  There is no guarding  Musculoskeletal:      Cervical back: Neck supple  Right lower leg: No tenderness  No edema  Left lower leg: No tenderness  No edema  Skin:     General: Skin is warm and dry        Capillary Refill: Capillary refill takes less than 2 seconds  Neurological:      General: No focal deficit present  Mental Status: He is disoriented  Motor: No weakness  --------------------------------------------------------------------------------------------------------------  Vitals:   Vitals:    03/27/22 0900 03/27/22 0930 03/27/22 1000 03/27/22 1030   BP: 109/62 111/67 111/69 105/64   BP Location: Right arm Right arm Right arm Right arm   Pulse: 82 88 82 78   Resp: (!) 32 (!) 32 (!) 34 (!) 34   Temp:       SpO2: 100% 99% 99% 99%     Temp  Min: 97 5 °F (36 4 °C)  Max: 97 5 °F (36 4 °C)        There is no height or weight on file to calculate BMI  N/A    Laboratory and Diagnostics:  Results from last 7 days   Lab Units 03/27/22  0733   WBC Thousand/uL 11 56*   HEMOGLOBIN g/dL 14 8   HEMATOCRIT % 48 7   PLATELETS Thousands/uL 264   NEUTROS PCT % 71   MONOS PCT % 11     Results from last 7 days   Lab Units 03/27/22  0733   SODIUM mmol/L 136   POTASSIUM mmol/L 4 3   CHLORIDE mmol/L 98*   CO2 mmol/L 36*   ANION GAP mmol/L 2*   BUN mg/dL 19   CREATININE mg/dL 1 05   CALCIUM mg/dL 9 7   GLUCOSE RANDOM mg/dL 147*   ALT U/L 27   AST U/L 16   ALK PHOS U/L 87   ALBUMIN g/dL 3 9   TOTAL BILIRUBIN mg/dL 0 93          Results from last 7 days   Lab Units 03/27/22  0825   INR  0 95   PTT seconds 25          Results from last 7 days   Lab Units 03/27/22  0733   LACTIC ACID mmol/L 2 0     ABG:    VBG:  Results from last 7 days   Lab Units 03/27/22  0733   PH JHONATHAN  7 243*   PCO2 JHONATHAN mm Hg 85 5*   PO2 JHONATHAN mm Hg 25 0*   HCO3 JHONATHAN mmol/L 36 1*   BASE EXC JHONATHAN mmol/L 5 1     Results from last 7 days   Lab Units 03/27/22  0733   PROCALCITONIN ng/ml 0 05       Micro:        EKG: sinus tachycardia  Imaging: I have personally reviewed pertinent reports     and I have personally reviewed pertinent films in PACS    Historical Information   Past Medical History:   Diagnosis Date    Arthritis     Bladder mass     Cardiomyopathy [facility])     Chest pain     COPD (chronic obstructive pulmonary disease) (HCC)     CPAP (continuous positive airway pressure) dependence     Emphysema of lung (HCC)     Hypoxia     nocturnal    Left bundle branch block     Multiple pulmonary nodules     last assessed: 10/12/16    Pneumonia     Sleep apnea     Sleep apnea, obstructive     Smoker      Past Surgical History:   Procedure Laterality Date    COLONOSCOPY      CYSTOSCOPY      CYSTOSCOPY  02/17/2021    PA CYSTOURETHROSCOPY,BIOPSY N/A 4/13/2021    Procedure: CYSTOSCOPY WITH BIOPSIES;  Surgeon: Horace Madrigal MD;  [facility];  Service: Urology    PA CYSTOURETHROSCOPY,FULGUR <0 5 CM LESN N/A 1/3/2020    Procedure: TRANSURETHRAL RESECTION OF BLADDER TUMOR (TURBT);   Surgeon: Horace Madrigal MD;  [facility];  Service: Urology    PA INSTILL ANTICANCER AGENT IN BLADDER N/A 1/3/2020    Procedure: Kathrene Copper;  Surgeon: Horace Madrigal MD;  [facility];  Service: Urology    PA TRANSURETHRAL ELEC-SURG PROSTATECTOM N/A 4/13/2021    Procedure: TRANSURETHRAL RESECTION OF PROSTATE (TURP) BLADDER BIOPSY, FULGURATION;  Surgeon: Horace Madrigal MD;  [facility];  Service: Urology     Social History   Social History     Substance and Sexual Activity   Alcohol Use Not Currently    Comment: rarely     Social History     Substance and Sexual Activity   Drug Use No     Social History     Tobacco Use   Smoking Status Former Smoker    Packs/day: 2 50    Years: 37 00    Pack years: 92 50    Types: Cigarettes    Start date: 1970    Quit date: 2012    Years since quitting: 10 2   Smokeless Tobacco Former [place]    1 ppd for 37 years, 2010 down to 5 cigs a day, is around second hand smoke       Family History:   Family History   Problem Relation Age of Onset    Diabetes Mother      I have reviewed this patient's family history and commented on sigificant items within the HPI      Medications:  Current Facility-Administered Medications   Medication Dose Route Frequency    aspirin chewable tablet 81 mg  81 mg Oral Once    azithromycin (ZITHROMAX) tablet 500 mg  500 mg Oral Q24H    budesonide (PULMICORT) inhalation solution 0 5 mg  0 5 mg Nebulization Q12H    enoxaparin (LOVENOX) subcutaneous injection 40 mg  40 mg Subcutaneous Daily    ergocalciferol (VITAMIN D2) capsule 50,000 Units  50,000 Units Oral Q28 Days    ipratropium (ATROVENT) 0 02 % inhalation solution 0 5 mg  0 5 mg Nebulization 4x Daily    levalbuterol (XOPENEX) inhalation solution 1 25 mg  1 25 mg Nebulization 4x Daily    methylPREDNISolone sodium succinate (Solu-MEDROL) injection 40 mg  40 mg Intravenous Q6H [address]    metoprolol succinate (TOPROL-XL) 24 hr tablet 25 mg  25 mg Oral Daily    pantoprazole (PROTONIX) EC tablet 20 mg  20 mg Oral Early Morning    pravastatin (PRAVACHOL) tablet 80 mg  80 mg Oral Daily With Dinner     Home medications:  Prior to Admission Medications   Prescriptions Last Dose Informant Patient Reported? Taking?    albuterol (2 5 mg/3 mL) 0 083 % nebulizer solution  Self No No   Sig: Take 3 mL (2 5 mg total) by nebulization 4 (four) times a day   albuterol (Ventolin HFA) 90 mcg/act inhaler  Self No No   Sig: Inhale 2 puffs every 6 (six) hours as needed for wheezing   alendronate (FOSAMAX) 70 mg tablet  Self No No   Sig: TAKE 1 TABLET BY MOUTH ONE TIME PER WEEK   aspirin 81 MG tablet  Self Yes No   Sig: Take 1 tablet by mouth daily   azithromycin (ZITHROMAX) 250 mg tablet   No No   Sig: Take 2 tablets today then 1 tablet daily x 4 days   docusate sodium (COLACE) 100 mg capsule  Self No No   Sig: Take 1 capsule (100 mg total) by mouth 2 (two) times a day for 7 days   ergocalciferol (VITAMIN D2) 50,000 units   No No   Sig: TAKE 1 CAPSULE (50,000 UNITS TOTAL) BY MOUTH EVERY 28 DAYS   finasteride (PROSCAR) 5 mg tablet  Self No No   Sig: Take 1 tablet (5 mg total) by mouth daily for 7 days   fluticasone-umeclidinium-vilanterol (TRELEGY ELLIPTA) 100-62 5-25 MCG/INH inhaler  Self No No   Sig: Inhale 1 puff daily Rinse mouth after use  guaiFENesin (ROBITUSSIN) 100 MG/5ML oral liquid  Self Yes No   Sig: Take 200 mg by mouth 3 (three) times a day as needed for cough   hydrocodone-chlorpheniramine polistirex (TUSSIONEX) 10-8 mg/5 mL ER suspension  Self No No   Sig: Take 5 mL by mouth every 12 (twelve) hours as needed for coughMax Daily Amount: 10 mL   ipratropium (ATROVENT) 0 02 % nebulizer solution  Self No No   Sig: Take 2 5 mL (0 5 mg total) by nebulization 4 (four) times a day   metoprolol succinate (TOPROL-XL) 25 mg 24 hr tablet  Self No No   Sig: Take 1 tablet (25 mg total) by mouth daily   neomycin-bacitracin-polymyxin b (NEOSPORIN) ointment  Self No No   Sig: Apply topically 2 (two) times a day for 5 days Apply topically twice daily for 5 days to the tip of the penis or catheter as needed for catheter irritation     omeprazole (PriLOSEC) 20 mg delayed release capsule   No No   Sig: TAKE 1 CAPSULE BY MOUTH EVERY DAY   oxybutynin (DITROPAN) 5 mg tablet  Self No No   Sig: Take 1 tablet (5 mg total) by mouth 2 (two) times a day for 4 days   predniSONE 10 mg tablet   No No   Sig: TAKE 4 TABLETS DAILY FOR 4 DAYS, THEN 3 TABLETS DAILY FOR 4 DAYS, THEN GO BACK TO 2 TABLETS DAILY   rosuvastatin (CRESTOR) 10 MG tablet  Self No No   Sig: TAKE 1 TABLET BY MOUTH EVERY DAY      Facility-Administered Medications: None     Allergies:  No Known Allergies  ------------------------------------------------------------------------------------------------------------  Advance Directive and Living Will:      Power of :    POLST:    ------------------------------------------------------------------------------------------------------------  Anticipated Length of Stay is > 2 midnights    Care Time Delivered:   Upon my evaluation, this patient had a high probability of imminent or life-threatening deterioration due to Acute hypoxic and hypercapnic respiratory failure, which required my direct attention, intervention, and personal management  I have personally provided Sixty-three minutes (10:18 to 11:28 ) of critical care time, exclusive of procedures, teaching, family meetings, and any prior time recorded by providers other than myself  Nadeem Baker DO        Portions of the record may have been created with voice recognition software  Occasional wrong word or "sound a like" substitutions may have occurred due to the inherent limitations of voice recognition software    Read the chart carefully and recognize, using context, where substitutions have occurred

## 2022-03-28 LAB
ALBUMIN SERPL BCP-MCNC: 2.9 G/DL (ref 3.5–5)
ALP SERPL-CCNC: 66 U/L (ref 46–116)
ALT SERPL W P-5'-P-CCNC: 21 U/L (ref 12–78)
ANION GAP SERPL CALCULATED.3IONS-SCNC: -1 MMOL/L (ref 4–13)
ARTERIAL PATENCY WRIST A: YES
AST SERPL W P-5'-P-CCNC: 12 U/L (ref 5–45)
BACTERIA UR QL AUTO: ABNORMAL /HPF
BASE EXCESS BLDA CALC-SCNC: 9 MMOL/L
BASOPHILS # BLD AUTO: 0.01 THOUSANDS/ΜL (ref 0–0.1)
BASOPHILS NFR BLD AUTO: 0 % (ref 0–1)
BILIRUB SERPL-MCNC: 0.7 MG/DL (ref 0.2–1)
BILIRUB UR QL STRIP: NEGATIVE
BUN SERPL-MCNC: 26 MG/DL (ref 5–25)
CALCIUM ALBUM COR SERPL-MCNC: 9.6 MG/DL (ref 8.3–10.1)
CALCIUM SERPL-MCNC: 8.7 MG/DL (ref 8.3–10.1)
CHLORIDE SERPL-SCNC: 99 MMOL/L (ref 100–108)
CLARITY UR: CLEAR
CO2 SERPL-SCNC: 37 MMOL/L (ref 21–32)
COLOR UR: ABNORMAL
CREAT SERPL-MCNC: 0.84 MG/DL (ref 0.6–1.3)
EOSINOPHIL # BLD AUTO: 0 THOUSAND/ΜL (ref 0–0.61)
EOSINOPHIL NFR BLD AUTO: 0 % (ref 0–6)
ERYTHROCYTE [DISTWIDTH] IN BLOOD BY AUTOMATED COUNT: 12.7 % (ref 11.6–15.1)
GFR SERPL CREATININE-BSD FRML MDRD: 92 ML/MIN/1.73SQ M
GLUCOSE SERPL-MCNC: 169 MG/DL (ref 65–140)
GLUCOSE UR STRIP-MCNC: ABNORMAL MG/DL
HCO3 BLDA-SCNC: 35.7 MMOL/L (ref 22–28)
HCT VFR BLD AUTO: 39 % (ref 36.5–49.3)
HGB BLD-MCNC: 12.3 G/DL (ref 12–17)
HGB UR QL STRIP.AUTO: NEGATIVE
IMM GRANULOCYTES # BLD AUTO: 0.03 THOUSAND/UL (ref 0–0.2)
IMM GRANULOCYTES NFR BLD AUTO: 0 % (ref 0–2)
IPAP: 12
KETONES UR STRIP-MCNC: NEGATIVE MG/DL
LEUKOCYTE ESTERASE UR QL STRIP: NEGATIVE
LYMPHOCYTES # BLD AUTO: 0.5 THOUSANDS/ΜL (ref 0.6–4.47)
LYMPHOCYTES NFR BLD AUTO: 6 % (ref 14–44)
MAGNESIUM SERPL-MCNC: 2.2 MG/DL (ref 1.6–2.6)
MCH RBC QN AUTO: 31.5 PG (ref 26.8–34.3)
MCHC RBC AUTO-ENTMCNC: 31.5 G/DL (ref 31.4–37.4)
MCV RBC AUTO: 100 FL (ref 82–98)
MONOCYTES # BLD AUTO: 0.35 THOUSAND/ΜL (ref 0.17–1.22)
MONOCYTES NFR BLD AUTO: 4 % (ref 4–12)
NEUTROPHILS # BLD AUTO: 7.99 THOUSANDS/ΜL (ref 1.85–7.62)
NEUTS SEG NFR BLD AUTO: 90 % (ref 43–75)
NITRITE UR QL STRIP: NEGATIVE
NON VENT- BIPAP: ABNORMAL
NON-SQ EPI CELLS URNS QL MICRO: ABNORMAL /HPF
NRBC BLD AUTO-RTO: 0 /100 WBCS
O2 CT BLDA-SCNC: 17.8 ML/DL (ref 16–23)
OXYHGB MFR BLDA: 97.3 % (ref 94–97)
PCO2 BLDA: 58.7 MM HG (ref 36–44)
PEEP MAX SETTING VENT: 5 CM[H2O]
PH BLDA: 7.4 [PH] (ref 7.35–7.45)
PH UR STRIP.AUTO: 6 [PH]
PHOSPHATE SERPL-MCNC: 4.2 MG/DL (ref 2.3–4.1)
PLATELET # BLD AUTO: 195 THOUSANDS/UL (ref 149–390)
PMV BLD AUTO: 9.8 FL (ref 8.9–12.7)
PO2 BLDA: 111.6 MM HG (ref 75–129)
POTASSIUM SERPL-SCNC: 4.2 MMOL/L (ref 3.5–5.3)
PROCALCITONIN SERPL-MCNC: <0.05 NG/ML
PROT SERPL-MCNC: 5.9 G/DL (ref 6.4–8.2)
PROT UR STRIP-MCNC: NEGATIVE MG/DL
RBC # BLD AUTO: 3.91 MILLION/UL (ref 3.88–5.62)
RBC #/AREA URNS AUTO: ABNORMAL /HPF
SODIUM SERPL-SCNC: 135 MMOL/L (ref 136–145)
SP GR UR STRIP.AUTO: 1.02 (ref 1–1.03)
SPECIMEN SOURCE: ABNORMAL
UROBILINOGEN UR STRIP-ACNC: <2 MG/DL
VENT BIPAP FIO2: 35 %
WBC # BLD AUTO: 8.88 THOUSAND/UL (ref 4.31–10.16)
WBC #/AREA URNS AUTO: ABNORMAL /HPF

## 2022-03-28 PROCEDURE — 84100 ASSAY OF PHOSPHORUS: CPT | Performed by: STUDENT IN AN ORGANIZED HEALTH CARE EDUCATION/TRAINING PROGRAM

## 2022-03-28 PROCEDURE — NC001 PR NO CHARGE: Performed by: INTERNAL MEDICINE

## 2022-03-28 PROCEDURE — 84145 PROCALCITONIN (PCT): CPT | Performed by: STUDENT IN AN ORGANIZED HEALTH CARE EDUCATION/TRAINING PROGRAM

## 2022-03-28 PROCEDURE — 94760 N-INVAS EAR/PLS OXIMETRY 1: CPT

## 2022-03-28 PROCEDURE — 94640 AIRWAY INHALATION TREATMENT: CPT

## 2022-03-28 PROCEDURE — 85025 COMPLETE CBC W/AUTO DIFF WBC: CPT | Performed by: STUDENT IN AN ORGANIZED HEALTH CARE EDUCATION/TRAINING PROGRAM

## 2022-03-28 PROCEDURE — 82805 BLOOD GASES W/O2 SATURATION: CPT | Performed by: STUDENT IN AN ORGANIZED HEALTH CARE EDUCATION/TRAINING PROGRAM

## 2022-03-28 PROCEDURE — 81001 URINALYSIS AUTO W/SCOPE: CPT | Performed by: EMERGENCY MEDICINE

## 2022-03-28 PROCEDURE — 80053 COMPREHEN METABOLIC PANEL: CPT | Performed by: STUDENT IN AN ORGANIZED HEALTH CARE EDUCATION/TRAINING PROGRAM

## 2022-03-28 PROCEDURE — 36600 WITHDRAWAL OF ARTERIAL BLOOD: CPT

## 2022-03-28 PROCEDURE — 94660 CPAP INITIATION&MGMT: CPT

## 2022-03-28 PROCEDURE — 83735 ASSAY OF MAGNESIUM: CPT | Performed by: STUDENT IN AN ORGANIZED HEALTH CARE EDUCATION/TRAINING PROGRAM

## 2022-03-28 PROCEDURE — 99222 1ST HOSP IP/OBS MODERATE 55: CPT | Performed by: PHYSICIAN ASSISTANT

## 2022-03-28 PROCEDURE — 99233 SBSQ HOSP IP/OBS HIGH 50: CPT | Performed by: INTERNAL MEDICINE

## 2022-03-28 RX ORDER — METHYLPREDNISOLONE SODIUM SUCCINATE 40 MG/ML
40 INJECTION, POWDER, LYOPHILIZED, FOR SOLUTION INTRAMUSCULAR; INTRAVENOUS EVERY 12 HOURS SCHEDULED
Status: DISCONTINUED | OUTPATIENT
Start: 2022-03-28 | End: 2022-03-30

## 2022-03-28 RX ADMIN — IPRATROPIUM BROMIDE 0.5 MG: 0.5 SOLUTION RESPIRATORY (INHALATION) at 14:28

## 2022-03-28 RX ADMIN — METHYLPREDNISOLONE SODIUM SUCCINATE 40 MG: 40 INJECTION, POWDER, FOR SOLUTION INTRAMUSCULAR; INTRAVENOUS at 00:24

## 2022-03-28 RX ADMIN — METHYLPREDNISOLONE SODIUM SUCCINATE 40 MG: 40 INJECTION, POWDER, FOR SOLUTION INTRAMUSCULAR; INTRAVENOUS at 05:14

## 2022-03-28 RX ADMIN — METHYLPREDNISOLONE SODIUM SUCCINATE 40 MG: 40 INJECTION, POWDER, FOR SOLUTION INTRAMUSCULAR; INTRAVENOUS at 21:32

## 2022-03-28 RX ADMIN — IPRATROPIUM BROMIDE 0.5 MG: 0.5 SOLUTION RESPIRATORY (INHALATION) at 20:12

## 2022-03-28 RX ADMIN — ENOXAPARIN SODIUM 40 MG: 40 INJECTION SUBCUTANEOUS at 09:02

## 2022-03-28 RX ADMIN — LEVALBUTEROL 1.25 MG: 1.25 SOLUTION, CONCENTRATE RESPIRATORY (INHALATION) at 20:12

## 2022-03-28 RX ADMIN — PRAVASTATIN SODIUM 80 MG: 20 TABLET ORAL at 18:42

## 2022-03-28 RX ADMIN — LEVALBUTEROL 1.25 MG: 1.25 SOLUTION, CONCENTRATE RESPIRATORY (INHALATION) at 14:28

## 2022-03-28 RX ADMIN — PANTOPRAZOLE SODIUM 20 MG: 20 TABLET, DELAYED RELEASE ORAL at 05:14

## 2022-03-28 RX ADMIN — IPRATROPIUM BROMIDE 0.5 MG: 0.5 SOLUTION RESPIRATORY (INHALATION) at 08:01

## 2022-03-28 RX ADMIN — BUDESONIDE 0.5 MG: 0.5 INHALANT ORAL at 20:12

## 2022-03-28 RX ADMIN — LEVALBUTEROL 1.25 MG: 1.25 SOLUTION, CONCENTRATE RESPIRATORY (INHALATION) at 08:01

## 2022-03-28 RX ADMIN — BUDESONIDE 0.5 MG: 0.5 INHALANT ORAL at 08:01

## 2022-03-28 RX ADMIN — ERGOCALCIFEROL 50000 UNITS: 1.25 CAPSULE ORAL at 09:02

## 2022-03-28 RX ADMIN — AZITHROMYCIN 500 MG: 500 TABLET, FILM COATED ORAL at 12:46

## 2022-03-28 NOTE — PROGRESS NOTES
Daily Progress Note - Critical Care   Georgina Tejedai  61 y o  male MRN: [id number]  Unit/Bed#: [facility] 207-01 Encounter: [id number]        ----------------------------------------------------------------------------------------  HPI/24hr events: 62 yo M with PMH of severe COPD, chronic hypoxemic and hypercapnic respiratory failure ( on O2 at home) , HFrEF, pulmonary nodules, KEVIN, GERD who presented with worsening SOB and AECOPD with acute on chronic hypoxemia and hypercapnia  In the ED, significant respiratory distress saturating at 75% on RA  Started on BiPAP due to continued tachypnea and increased WOB on nasal cannula  Overnight, patient maintained saturation around %, alternating on BiPAP and full face mask  Afebrile, no tachycardia or tachypnea  ABG noted ph of 7 402 with pCO2 58 7 and HCO3 of 35 7  I/O +240 within last 24 hours  ---------------------------------------------------------------------------------------  SUBJECTIVE  Patient seen and examined at bedside  Does not complain of SOB or chest pain  Reports feeling tired due to lack of sleep  Otherwise, denies fatigue due to WOB  Review of Systems   Constitutional: Negative for activity change and fatigue  HENT: Negative for congestion  Respiratory: Negative for cough and shortness of breath  Cardiovascular: Negative for chest pain  Musculoskeletal: Negative for arthralgias  Neurological: Negative for speech difficulty and weakness  Review of systems was reviewed and negative unless stated above in HPI/24-hour events   ---------------------------------------------------------------------------------------  Assessment and Plan:    Neuro:  - Lethargic due to hypercapnia  - Continue BiPAP  - On delirium precautions      CV:    Diagnosis: HFrEF  - EF 45-50%  - Secondary to dilated cardiomyopathy and LBBB  Plan:  - Continue home Metoprolol 25 mg daily    - Watch sodium intake and signs of volume overload     Diagnosis: History of CAD - nonobstructive by cardiac catheterization  - Continue high intensity statin and ASA  -       Pulm:   Diagnosis: Acute on chronic hypoxemic and hypercapnic respiratory failure, in the setting of COPD with acute exacerbation  - Started initially on BiPAP given respiratory distress despite O2 therapy  - Requires O2 ( to maintain sat > 88%) and CPAP at home    Plan:  - continue to trend ABG  - Continue Solumedrol IV TID  - Continue Xopenex and Atrovent  - IV Azithromycin daily for 5 days (day 2/5)    · Diagnosis: SIRS with presumed PNM  - Elevated WBC (reactive due to outpatient steroid use? and tachycardia noted while in the ED  Negative procalcitonin and normal lactic acid  - given IV Ceftriaxone once with concerns of PNM  - Switched to IV Azithromycin due to COPD exacerbation  - CBC on 3/28 shows normalized WBC level  Tachycardia and tachypnea resolved  - BC x2 pending  GI:   - No cute concerns  - Continue Protonix 20 mg daily      :   - no acute concerns  - No signs of ARAM  - Continue to monitor BUN/Cr   - Monitor I/O      F/E/N:   - F: not on mIVF  - E: replete as needed  - N:CV diet, fluid and sodium restriction      Heme/Onc:   - no acute issues  - Continue to monitor WBC trend and fever curve  - On Lovenox for DVT ppx      Endo:   - no acute concerns  - BG goal 140-180      ID:   - Negative Flu/Covid/RSV panel  - see Pulm assessment and plan  - On Azithromycin      MSK/Skin:   - no acute issues  - Continue frequent turning and up with assistance      Disposition: Continue Critical Care   Code Status: Level 1 - Full Code  ---------------------------------------------------------------------------------------  ICU CORE MEASURES    Prophylaxis   VTE Pharmacologic Prophylaxis: Enoxaparin (Lovenox)  VTE Mechanical Prophylaxis: sequential compression device  Stress Ulcer Prophylaxis: Pantoprazole PO    ABCDE Protocol (if indicated)  Plan to perform spontaneous awakening trial today?  Not applicable  Plan to perform spontaneous breathing trial today? Not applicable  Obvious barriers to extubation? Not applicable  CAM-ICU: per nursing    Invasive Devices Review  Invasive Devices  Report    Peripheral Intravenous Line            Peripheral IV 03/27/22 Left Antecubital 1 day    Peripheral IV 03/27/22 Right Antecubital 1 day              Can any invasive devices be discontinued today? Not applicable  ---------------------------------------------------------------------------------------  OBJECTIVE    Physical Exam  Constitutional:       Appearance: Normal appearance  HENT:      Head: Normocephalic and atraumatic  Mouth/Throat:      Mouth: Mucous membranes are moist    Eyes:      General: No scleral icterus  Right eye: No discharge  Left eye: No discharge  Extraocular Movements: Extraocular movements intact  Cardiovascular:      Rate and Rhythm: Normal rate and regular rhythm  Pulses: Normal pulses  Heart sounds: No murmur heard  Pulmonary:      Effort: Pulmonary effort is normal  No respiratory distress  Breath sounds: Wheezing present  Comments: Inspiratory and expiratory wheezes  On Nasal cannula  No nasal flare noted  No diminished breath sounds  Abdominal:      General: Bowel sounds are normal       Palpations: Abdomen is soft  Tenderness: There is no abdominal tenderness  There is no guarding  Musculoskeletal:         General: Normal range of motion  Right lower leg: No edema  Left lower leg: No edema  Neurological:      General: No focal deficit present  Mental Status: He is alert and oriented to person, place, and time  Comments: AAO x 3  No focal deficits  Speech fluent  Moves all extremities spontaneously  Psychiatric:         Mood and Affect: Mood normal          Behavior: Behavior normal          Thought Content:  Thought content normal          Judgment: Judgment normal          Vitals   Vitals: 22 0500 22 0520 22 0600 22 0803   BP:  97/72     BP Location:       Pulse:  80     Resp:       Temp: 97 7 °F (36 5 °C)      TempSrc: Oral      SpO2:  98%  97%   Weight:   55 5 kg (122 lb 5 7 oz)    Height:         Temp (24hrs), Av 9 °F (36 6 °C), Min:97 5 °F (36 4 °C), Max:98 4 °F (36 9 °C)  Current: Temperature: 97 7 °F (36 5 °C)  HR: 80  BP: 92/72  RR: 19  SpO2: 98%    Respiratory:  SpO2: SpO2: 97 %       Invasive/non-invasive ventilation settings   Respiratory  Report   Lab Data (Last 4 hours)       0452            pH, Arterial       7 402             pCO2, Arterial       58 7             pO2, Arterial       111 6             HCO3, Arterial       35 7             Base Excess, Arterial       9 0                  O2/Vent Data        0319   Most Recent        Non-Invasive Ventilation Mode BiPAP                    Height and Weights   Height: 5' 2" (157 5 cm)  IBW (Ideal Body Weight): 54 6 kg  Body mass index is 22 38 kg/m²  Weight (last 2 days)     Date/Time Weight    22 0600 55 5 (122 36)    22 1138 55 5 (122 36)    22 1022 55 5 (122 36)          Intake and Output  I/O        0701   0700  0701   0700    P  O   840    Total Intake(mL/kg)  840 (15 1)    Urine (mL/kg/hr)  600    Stool  0    Total Output  600    Net  +240          Unmeasured Stool Occurrence  1 x            Nutrition       Diet Orders   (From admission, onward)             Start     Ordered    22 1420  Diet Cardiovascular; Cardiac; Fluid Restriction 2000 ML, Fluid Restriction 1800 ML, Sodium 2 GM  Diet effective now        References:    Nutrtion Support Algorithm Enteral vs  Parenteral   Question Answer Comment   Diet Type Cardiovascular    Cardiac Cardiac    Other Restriction(s): Fluid Restriction 2000 ML    Other Restriction(s): Fluid Restriction 1800 ML    Other Restriction(s): Sodium 2 GM    RD to adjust diet per protocol?  No        22 1420 Laboratory and Diagnostics:  Results from last 7 days   Lab Units 03/28/22  0606 03/27/22  0733   WBC Thousand/uL 8 88 11 56*   HEMOGLOBIN g/dL 12 3 14 8   HEMATOCRIT % 39 0 48 7   PLATELETS Thousands/uL 195 264   NEUTROS PCT %  --  71   MONOS PCT %  --  11     Results from last 7 days   Lab Units 03/28/22  0527 03/27/22  0733   SODIUM mmol/L 135* 136   POTASSIUM mmol/L 4 2 4 3   CHLORIDE mmol/L 99* 98*   CO2 mmol/L 37* 36*   ANION GAP mmol/L -1* 2*   BUN mg/dL 26* 19   CREATININE mg/dL 0 84 1 05   CALCIUM mg/dL 8 7 9 7   GLUCOSE RANDOM mg/dL 169* 147*   ALT U/L 21 27   AST U/L 12 16   ALK PHOS U/L 66 87   ALBUMIN g/dL 2 9* 3 9   TOTAL BILIRUBIN mg/dL 0 70 0 93     Results from last 7 days   Lab Units 03/28/22  0527   MAGNESIUM mg/dL 2 2   PHOSPHORUS mg/dL 4 2*      Results from last 7 days   Lab Units 03/27/22  0825   INR  0 95   PTT seconds 25          Results from last 7 days   Lab Units 03/27/22  2246 03/27/22  0733   LACTIC ACID mmol/L 2 0 2 0     ABG:  Results from last 7 days   Lab Units 03/28/22  0452   PH ART  7 402   PCO2 ART mm Hg 58 7*   PO2 ART mm Hg 111 6   HCO3 ART mmol/L 35 7*   BASE EXC ART mmol/L 9 0   ABG SOURCE  Radial, Right     VBG:  Results from last 7 days   Lab Units 03/28/22  0452 03/27/22  0733   PH JHONATHAN   --  7 243*   PCO2 JHONATHAN mm Hg  --  85 5*   PO2 JHONATHAN mm Hg  --  25 0*   HCO3 JHONATHAN mmol/L  --  36 1*   BASE EXC JHONATHAN mmol/L  --  5 1   ABG SOURCE  Radial, Right  --      Results from last 7 days   Lab Units 03/28/22  0527 03/27/22  0733   PROCALCITONIN ng/ml <0 05 0 05       Micro  Results from last 7 days   Lab Units 03/27/22  0733   BLOOD CULTURE  Received in Microbiology Lab  Culture in Progress  Received in Microbiology Lab  Culture in Progress  EKG: On 3/27:  Sinus tachycardia  Incomplete left bundle branch block  Imaging:  I have personally reviewed pertinent reports        Active Medications  Scheduled Meds:  Current Facility-Administered Medications   Medication Dose Route Frequency Provider Last Rate    azithromycin  500 mg Oral Q24H Venus Fass, DO      budesonide  0 5 mg Nebulization Q12H Venus Fass, DO      enoxaparin  40 mg Subcutaneous Daily Venus Fass, DO      ergocalciferol  50,000 Units Oral Q28 Days Venus Fass, DO      ipratropium  0 5 mg Nebulization TID Richard June MD      levalbuterol  1 25 mg Nebulization TID Richard June MD      methylPREDNISolone sodium succinate  40 mg Intravenous Q6H [facility] Venus Fass, DO      metoprolol succinate  25 mg Oral Daily Venus Fass, DO      pantoprazole  20 mg Oral Early Morning Venus Fass, DO      pravastatin  80 mg Oral Daily With FunnelFire, DO       Continuous Infusions:     PRN Meds:        Allergies   No Known Allergies    Advance Directive and Living Will:      Power of :    POLST:      Counseling / Coordination of Care  Total Critical Care time spent 30 minutes excluding procedures, teaching and family updates  Taty Norman MD      Portions of the record may have been created with voice recognition software  Occasional wrong word or "sound a like" substitutions may have occurred due to the inherent limitations of voice recognition software    Read the chart carefully and recognize, using context, where substitutions have occurred

## 2022-03-28 NOTE — PROGRESS NOTES
[address] [facility]  Transfer Note - Sabino Aguilar  1955, 61 y o  male MRN: [id number]  [facility]: [id number]  Primary Care Provider: Gael Miller DO   Date and time admitted to hospital: 3/27/2022  7:14 AM    * [facility] on chronic [facility] with [facility]  Assessment & Plan  - Sat of 75 % on admission; placed on 6L via NC, but still presenting tachycardia and tachypnea  Placed on BiPAP  - Admitted to SD1,   - Currently saturating at 99% on 3 L via NC, with recommendations to use BiPAP HS  Pulmonary nodules/lesions, multiple  Assessment & Plan  - On most recent lung cancer screening CT he had a 7 mm patchy left lower lobe density, unlikely malignant    - Planned scan in 12 months, as outpatient  Acute exacerbation of chronic obstructive pulmonary disease (COPD) ([facility] )  Assessment & Plan  - Patient improved signs and symptoms of exacerbation; no tachycardia or tachypnea present this morning   - ABG this morning noted ph of 7 402 with pCO2 58 7 and HCO3 of 35 7    - Continue Solumedrol IV BID for one or two more days  - Continue Xopenex and Atrovent  - PO Azithromycin daily for 5 days (day 2/5)  - With concerns of SIRS in the setting of PNM, Ceftriaxone was given once while in the ED and BC x 2 drawn (results pending)  Chronic hypoxemic respiratory failure (HCC)  Assessment & Plan  - On supplemental oxygen to maintain sats greater than 88%         Impaired fasting glucose  Assessment & Plan  - Not on pharmacologic therapy  - Maintain BG at goal (140-180)    Gastroesophageal reflux disease  Assessment & Plan  - Continue Protonix 20 mg    Hypercholesterolemia  Assessment & Plan  - Continue high-intensity statin    KEVIN and COPD overlap syndrome ([facility] )  Assessment & Plan  - Follows up with Pulmonary as outpatient   - Not using CPAP at home, although recommended as per Pulmonology (Dr Chun Level)  - Baseline: 3 L O2 via NC  - Continue BiPAP HS and O2 supplementation  Left bundle-branch block  Assessment & Plan  - stable  - Pt follows up as outpatient with Cardiology    Chronic systolic congestive heart failure [facility])  Assessment & Plan  Wt Readings from Last 3 Encounters:   03/28/22 55 5 kg (122 lb 5 7 oz)   01/13/22 56 kg (123 lb 6 4 oz)   12/16/21 58 5 kg (129 lb)       - HFrEF  - EF 45-50%  - Secondary to dilated cardiomyopathy and LBBB  - Continue home Metoprolol 25 mg daily   - Not on acute exacerbation  - Watch sodium intake and signs of volume overload      Chronic obstructive pulmonary disease (HCC)  Assessment & Plan  - End-stage lung disease   - Home meds: prednisone 20 mg daily, Trelegy inhalation daily DuoNebs q i d   - He is up-to-date on pneumonia vaccines  - Placed consult for palliative care  Cardiomyopathy, dilated ([facility] )  Assessment & Plan  - Continue management as per HFrEF assessment and plan    Coronary artery disease  Assessment & Plan  - Hx of CAD- nonobstructive by cardiac catheterization  - Continue high intensity statin and ASA          Subjective/Objective     Subjective:    60 yo M with PMH of severe COPD, chronic hypoxemic and hypercapnic respiratory failure ( on O2 at home) , HFrEF, pulmonary nodules, KEVIN, GERD who presented with worsening SOB and AECOPD with acute on chronic hypoxemia and hypercapnia  In the ED, significant respiratory distress saturating at 75% on RA  Started on BiPAP due to continued tachypnea and increased WOB on nasal cannula  With concerns of SIRS (WBC elevation, tachycardia and tachypnea) in the setting of possible PNM, received 1 dose of Ceftriaxone and had blood culture drawn, which are still pending  While in [facility], patient was started in acute COPD exacerbation treatment with Xopenex, Atrovent, IV solumedrol and PO Azithromycin, in addition to BiPAP at bedtime   On the next day, patient had improvement of respiratory parameters, as evidenced by ABG and clinical symptoms, and was downgraded to Med Surg to continue proper level of care  Since patient has end-stage COPD, a palliative consult was p        Objective:  Vitals: Blood pressure 111/71, pulse 90, temperature 97 8 °F (36 6 °C), resp  rate 20, height 5' 2" (1 575 m), weight 55 5 kg (122 lb 5 7 oz), SpO2 95 %  ,Body mass index is 22 38 kg/m²  Intake/Output Summary (Last 24 hours) at 3/28/2022 1417  Last data filed at 3/28/2022 1201  Gross per 24 hour   Intake 1200 ml   Output 1275 ml   Net -75 ml       Invasive Devices  Report    Peripheral Intravenous Line            Peripheral IV 03/27/22 Left Antecubital 1 day    Peripheral IV 03/27/22 Right Antecubital 1 day                Physical Exam: General appearance: alert and cooperative  Head: Normocephalic, without obvious abnormality, atraumatic  Lungs: wheezes  Chest wall: no tenderness  Heart: regular rate and rhythm, S1, S2 normal, no murmur, click, rub or gallop  Abdomen: soft, non-tender; bowel sounds normal; no masses,  no organomegaly  Extremities: extremities normal, warm and well-perfused; no cyanosis, clubbing, or edema and no edema, redness or tenderness in the calves or thighs  Neurologic: Alert and oriented X 3, normal strength and tone  Normal symmetric reflexes  Normal coordination and gait  Mental status: Alert, oriented, thought content appropriate  Cranial nerves: normal    Lab, Imaging and other studies: I have personally reviewed pertinent reports      VTE Pharmacologic Prophylaxis: Enoxaparin (Lovenox)  VTE Mechanical Prophylaxis: sequential compression device

## 2022-03-28 NOTE — ASSESSMENT & PLAN NOTE
- Patient improved signs and symptoms of exacerbation; no tachycardia or tachypnea present this morning   - ABG this morning noted ph of 7 402 with pCO2 58 7 and HCO3 of 35 7    - Continue Solumedrol IV BID for one or two more days  - Continue Xopenex and Atrovent  - PO Azithromycin daily for 5 days (day 2/5)  - With concerns of SIRS in the setting of PNM, Ceftriaxone was given once while in the ED and BC x 2 drawn (results pending)

## 2022-03-28 NOTE — ASSESSMENT & PLAN NOTE
Wt Readings from Last 3 Encounters:   03/28/22 55 5 kg (122 lb 5 7 oz)   01/13/22 56 kg (123 lb 6 4 oz)   12/16/21 58 5 kg (129 lb)       - HFrEF  - EF 45-50%  - Secondary to dilated cardiomyopathy and LBBB    - Continue home Metoprolol 25 mg daily   - Not on acute exacerbation  - Watch sodium intake and signs of volume overload

## 2022-03-28 NOTE — CONSULTS
Consultation - Palliative and Supportive Care   Farooq Pour  61 y o  male [id number]    Patient Active Problem List   Diagnosis    Coronary artery disease    Cardiomyopathy, dilated ([facility] )    Chronic obstructive pulmonary disease (HCC)    Chronic systolic congestive heart failure (HCC)    Left bundle-branch block    KEVIN and COPD overlap syndrome (HCC)    Hypercholesterolemia    Gastroesophageal reflux disease    Impaired fasting glucose    Osteoarthritis    Osteoporosis    Vitamin D deficiency    Mood disorder (HCC)    Other insomnia    Macrocytosis without anemia    Weight loss    Chronic hypoxemic respiratory failure (HCC)    BPH with obstruction/lower urinary tract symptoms    Prostate cancer (HCC)    Abnormal CT of the chest    Acute exacerbation of chronic obstructive pulmonary disease (COPD) ([facility] )    Acute on chronic respiratory failure with hypoxia and hypercapnia (HCC)    Pulmonary nodules/lesions, multiple    Former tobacco use     Active issues specifically addressed today include:   Goals of care  COPD  HFrEF    Plan:  1  Symptom management -    - consider low dose oxyIR or morphine for increased WOB  Will discuss with pulmonology    2  Goals - level 3 DNR   - Ongoing medical management with limits of DNR/DNI   - Patient wants to "be around for his family" as long as possible, but would not want any form of resuscitation     - He would love to receive a lung transplant if a candidate but is realistic that this is unlikely  - Patient agreeable to ACP with his wife     Code Status: DNR - Level 3   Decisional apparatus:  Patient is competent on my exam today  If competence is lost, patient's substitute decision maker would default to spouse by [place]  Advance Directive / Living Will / POLST:  None, but interested in completing    3  Social support   - Time spent providing supportive listening   - Patient supported by his wife   He also has a son and daughter (12 and 17)     I have reviewed the patient's controlled substance dispensing history in the Prescription Drug Monitoring Program in compliance with the [facility] regulations before prescribing any controlled substances  We appreciate the invitation to be involved in this patient's care  We will continue to follow  Please do not hesitate to reach our on call provider through our clinic answering service at  should you have acute symptom control concerns  April D DELIA Arvizu  Palliative and Supportive Care  Clinic/Answering Service: [phone]  You can find me on [employer]! IDENTIFICATION:  Inpatient consult to Palliative Care  Consult performed by: Malu Gotti PA-C  Consult ordered by: Destinee Viclhis MD        Physician Requesting Consult: Duyen Montenegro DO  Reason for Consult / Principal Problem: goals of care   Hx and PE limited by:  n/a      HISTORY OF PRESENT ILLNESS:       Luis Paris  is a 61 y o  male with chronic hypoxic respiratory failure in the setting of severe COPD, KEVIN, GERD, Heart failure with reduced EF, t1 a prostate cancer, low grade bladder cancer who presented to [facility] on 3/28 with shortness of breath, wheezing and sputum production  He was hypoxic in the ED to 75%  He was placed on BIPAP and admitted to [facility]  He has since improved and has transitioned to 3 L nasal cannula  He is followed closely by Dr Martha Carmen of pulmonary and Dr Christine Leigh of cardiology  Previously, he remained fairly stable despite having end-stage lung disease  He utilizes BIPAP at night at home and is oxygen dependent during the day  The patient's last hospitalization was in April 2021  He lives independently with his wife Esther Dang  Review of Systems   Constitutional: Positive for decreased appetite  Cardiovascular: Positive for dyspnea on exertion  Respiratory: Positive for shortness of breath      Musculoskeletal:        Denies pain Gastrointestinal: Negative for nausea  Neurological: Negative for difficulty with concentration  Psychiatric/Behavioral: The patient has insomnia  The patient is not nervous/anxious  All other systems reviewed and are negative  Past Medical History:   Diagnosis Date    Arthritis     Bladder mass     Cardiomyopathy [facility])     Chest pain     COPD (chronic obstructive pulmonary disease) (HCC)     CPAP (continuous positive airway pressure) dependence     Emphysema of lung (HCC)     Hypoxia     nocturnal    Left bundle branch block     Multiple pulmonary nodules     last assessed: 10/12/16    Pneumonia     Sleep apnea     Sleep apnea, obstructive     Smoker      Past Surgical History:   Procedure Laterality Date    COLONOSCOPY      CYSTOSCOPY      CYSTOSCOPY  02/17/2021    MS CYSTOURETHROSCOPY,BIOPSY N/A 4/13/2021    Procedure: CYSTOSCOPY WITH BIOPSIES;  Surgeon: Mary Pennington MD;  [facility];  Service: Urology    MS CYSTOURETHROSCOPY,FULGUR <0 5 CM LESN N/A 1/3/2020    Procedure: TRANSURETHRAL RESECTION OF BLADDER TUMOR (TURBT);   Surgeon: Mary Pennington MD;  [facility];  Service: Urology    MS INSTILL ANTICANCER AGENT IN BLADDER N/A 1/3/2020    Procedure: Bartolo De La Torre;  Surgeon: Mary Pennington MD;  [facility];  Service: Urology    MS TRANSURETHRAL ELEC-SURG PROSTATECTOM N/A 4/13/2021    Procedure: TRANSURETHRAL RESECTION OF PROSTATE (TURP) BLADDER BIOPSY, FULGURATION;  Surgeon: Mary Pennington MD;  [facility];  Service: Urology     Social History     Socioeconomic History    Marital status: /Civil Union     Spouse name: Not on file    Number of children: Not on file    Years of education: Not on file    Highest education level: Not on file   Occupational History    Not on file   Tobacco Use    Smoking status: Former Smoker     Packs/day: 2 50     Years: 37 00     Pack years: 92 50     Types: Cigarettes     Start date: 5     Quit date: 2012     Years since quitting: 10 2    Smokeless tobacco: Former User    Tobacco comment: 1 ppd for 37 years, 2010 down to 5 cigs a day, is around second hand smoke   Vaping Use    Vaping Use: Never used   Substance and Sexual Activity    Alcohol use: Not Currently     Comment: rarely    Drug use: No    Sexual activity: Not Currently     Partners: Female   Other Topics Concern    Not on file   Social History Narrative    Daily coffee consumption: 8 or more cups a day         Social Determinants of Health     Financial Resource Strain: Not on file   Food Insecurity: Not on file   Transportation Needs: Not on file   Physical Activity: Not on file   Stress: Not on file   Social Connections: Not on file   Intimate Partner Violence: Not on file   Housing Stability: Not on file     Family History   Problem Relation Age of Onset    Diabetes Mother        MEDICATIONS / ALLERGIES:    current meds:   Current Facility-Administered Medications   Medication Dose Route Frequency    azithromycin (ZITHROMAX) tablet 500 mg  500 mg Oral Q24H    budesonide (PULMICORT) inhalation solution 0 5 mg  0 5 mg Nebulization Q12H    enoxaparin (LOVENOX) subcutaneous injection 40 mg  40 mg Subcutaneous Daily    ergocalciferol (VITAMIN D2) capsule 50,000 Units  50,000 Units Oral Q28 Days    ipratropium (ATROVENT) 0 02 % inhalation solution 0 5 mg  0 5 mg Nebulization TID    levalbuterol (XOPENEX) inhalation solution 1 25 mg  1 25 mg Nebulization TID    methylPREDNISolone sodium succinate (Solu-MEDROL) injection 40 mg  40 mg Intravenous Q12H [facility]    metoprolol succinate (TOPROL-XL) 24 hr tablet 25 mg  25 mg Oral Daily    pantoprazole (PROTONIX) EC tablet 20 mg  20 mg Oral Early Morning    pravastatin (PRAVACHOL) tablet 80 mg  80 mg Oral Daily With Dinner       No Known Allergies    OBJECTIVE:    Physical Exam  Physical Exam  Constitutional:       General: He is not in acute distress  Appearance: He is not ill-appearing  HENT:      Head: Normocephalic and atraumatic  Eyes:      Conjunctiva/sclera: Conjunctivae normal    Cardiovascular:      Rate and Rhythm: Normal rate  Pulmonary:      Effort: Pulmonary effort is normal  No respiratory distress  Comments: supplenental O2  Abdominal:      General: There is no distension  Tenderness: There is no guarding  Musculoskeletal:         General: No swelling  Skin:     General: Skin is warm and dry  Neurological:      Mental Status: He is alert  Psychiatric:         Mood and Affect: Mood normal          Behavior: Behavior normal          Thought Content: Thought content normal          Judgment: Judgment normal          Lab Results:   CBC:   Lab Results   Component Value Date    WBC 8 88 03/28/2022    HGB 12 3 03/28/2022    HCT 39 0 03/28/2022     (H) 03/28/2022     03/28/2022    MCH 31 5 03/28/2022    MCHC 31 5 03/28/2022    RDW 12 7 03/28/2022    MPV 9 8 03/28/2022    NRBC 0 03/28/2022   , CMP:   Lab Results   Component Value Date    SODIUM 135 (L) 03/28/2022    K 4 2 03/28/2022    CL 99 (L) 03/28/2022    CO2 37 (H) 03/28/2022    BUN 26 (H) 03/28/2022    CREATININE 0 84 03/28/2022    CALCIUM 8 7 03/28/2022    AST 12 03/28/2022    ALT 21 03/28/2022    ALKPHOS 66 03/28/2022    EGFR 92 03/28/2022   , BMP:  Lab Results   Component Value Date    SODIUM 135 (L) 03/28/2022    K 4 2 03/28/2022    CL 99 (L) 03/28/2022    CO2 37 (H) 03/28/2022    BUN 26 (H) 03/28/2022    CREATININE 0 84 03/28/2022    GLUC 169 (H) 03/28/2022    CALCIUM 8 7 03/28/2022    AGAP -1 (L) 03/28/2022    EGFR 92 03/28/2022     Imaging Studies: reviewed  EKG, Pathology, and Other Studies: reviewed     Counseling / Coordination of Care    Total floor / unit time spent today 45+ minutes  Greater than 50% of total time was spent with the patient and / or family counseling and / or coordination of care   A description of the counseling / coordination of care: discussing Roberta Tirado coordinating with RN

## 2022-03-28 NOTE — ASSESSMENT & PLAN NOTE
- On most recent lung cancer screening CT he had a 7 mm patchy left lower lobe density, unlikely malignant    - Planned scan in 12 months, as outpatient

## 2022-03-28 NOTE — ASSESSMENT & PLAN NOTE
- Sat of 75 % on admission; placed on 6L via NC, but still presenting tachycardia and tachypnea  Placed on BiPAP  - Admitted to SD1,   - Currently saturating at 99% on 3 L via NC, with recommendations to use BiPAP HS

## 2022-03-28 NOTE — ASSESSMENT & PLAN NOTE
- Follows up with Pulmonary as outpatient   - Not using CPAP at home, although recommended as per Pulmonology (Dr Jim Malone)  - Baseline: 3 L O2 via NC  - Continue BiPAP HS and O2 supplementation

## 2022-03-28 NOTE — SEPSIS NOTE
Sepsis Note   Orin Bragg  61 y o  male MRN: [id number]  Unit/Bed#: [facility] 207-01 Encounter: [id number]  SIRS due to COPD exacerbation  Already on azithromycin  qSOFA     Row Name 03/27/22 2100 03/27/22 2023 03/27/22 2000 03/27/22 1736 03/27/22 1600    Altered mental status GCS < 15 -- 0 -- -- 0    Respiratory Rate > / =22 1 -- -- -- --    Systolic BP < / =610 -- -- 0 1 1    Q Sofa Score 1 -- -- -- --    Row Name 03/27/22 1446 03/27/22 1150 03/27/22 1138 03/27/22 1030 03/27/22 1000    Altered mental status GCS < 15 -- 0 -- -- --    Respiratory Rate > / =22 -- -- 1 1 1    Systolic BP < / =624 0 -- 0 0 0    Q Sofa Score 1 1 1 1 1    Row Name 03/27/22 0930 03/27/22 0900 03/27/22 0800 03/27/22 0732 03/27/22 0722    Altered mental status GCS < 15 -- -- -- 0 --    Respiratory Rate > / =22 1 1 1 -- 1    Systolic BP < / =005 0 0 0 -- 0    Q Sofa Score 1 1 1 1 1               Initial Sepsis Screening     Row Name 03/27/22 2153 03/27/22 0923             Is the patient's history suggestive of a new or worsening infection? No  -KM Yes (Proceed)  -JH       Suspected source of infection -- pneumonia  LIFESCAPE       Are two or more of the following signs & symptoms of infection both present and new to the patient? -- Yes (Proceed)  LIFESCAPE       Indicate SIRS criteria Leukocytosis (WBC > 55923 IJL); Tachycardia > 90 bpm;Tachypnea > 20 resp per min  -KM Tachycardia > 90 bpm;Tachypnea > 20 resp per min;Leukocytosis (WBC > 08207 IJL)  LIFESCAPE       If the answer is yes to both questions, suspicion of sepsis is present -- --       If severe sepsis is present AND tissue hypoperfusion perists in the hour after fluid resuscitation or lactate > 4, the patient meets criteria for SEPTIC SHOCK -- --       Are any of the following organ dysfunction criteria present within 6 hours of suspected infection and SIRS criteria that are NOT considered to be chronic conditions? -- Yes  LIFESCAPE       Organ dysfunction -- Acute respiratory failure (new need for invasive or non-invasive mechanical ventilation)  -JH       Date of presentation of severe sepsis -- 03/27/22  LIFESCAPE       Time of presentation of severe sepsis -- 0923  LIFESCAPE       Tissue hypoperfusion persists in the hour after crystalloid fluid administration, evidenced, by either: -- --       Was hypotension present within one hour of the conclusion of crystalloid fluid administration? -- --       Date of presentation of septic shock -- --       Time of presentation of septic shock -- --             User Key  (r) = Recorded By, (t) = Taken By, (c) = Cosigned By    Initials Name Provider Type    Tyler Hernandez MD Physician    One Michael Sinha DO Physician

## 2022-03-28 NOTE — ASSESSMENT & PLAN NOTE
- End-stage lung disease   - Home meds: prednisone 20 mg daily, Trelegy inhalation daily DuoNebs q i d   - He is up-to-date on pneumonia vaccines  - Placed consult for palliative care

## 2022-03-29 PROBLEM — G93.41 ACUTE METABOLIC ENCEPHALOPATHY: Status: ACTIVE | Noted: 2022-03-29

## 2022-03-29 PROCEDURE — 94640 AIRWAY INHALATION TREATMENT: CPT

## 2022-03-29 PROCEDURE — 94760 N-INVAS EAR/PLS OXIMETRY 1: CPT

## 2022-03-29 PROCEDURE — 99233 SBSQ HOSP IP/OBS HIGH 50: CPT | Performed by: STUDENT IN AN ORGANIZED HEALTH CARE EDUCATION/TRAINING PROGRAM

## 2022-03-29 PROCEDURE — 94660 CPAP INITIATION&MGMT: CPT

## 2022-03-29 PROCEDURE — 99232 SBSQ HOSP IP/OBS MODERATE 35: CPT | Performed by: PHYSICIAN ASSISTANT

## 2022-03-29 RX ORDER — AZITHROMYCIN 500 MG/1
500 TABLET, FILM COATED ORAL EVERY 24 HOURS
Status: COMPLETED | OUTPATIENT
Start: 2022-03-29 | End: 2022-03-29

## 2022-03-29 RX ADMIN — METHYLPREDNISOLONE SODIUM SUCCINATE 40 MG: 40 INJECTION, POWDER, FOR SOLUTION INTRAMUSCULAR; INTRAVENOUS at 08:53

## 2022-03-29 RX ADMIN — METHYLPREDNISOLONE SODIUM SUCCINATE 40 MG: 40 INJECTION, POWDER, FOR SOLUTION INTRAMUSCULAR; INTRAVENOUS at 21:38

## 2022-03-29 RX ADMIN — IPRATROPIUM BROMIDE 0.5 MG: 0.5 SOLUTION RESPIRATORY (INHALATION) at 07:11

## 2022-03-29 RX ADMIN — IPRATROPIUM BROMIDE 0.5 MG: 0.5 SOLUTION RESPIRATORY (INHALATION) at 13:55

## 2022-03-29 RX ADMIN — BUDESONIDE 0.5 MG: 0.5 INHALANT ORAL at 19:30

## 2022-03-29 RX ADMIN — BUDESONIDE 0.5 MG: 0.5 INHALANT ORAL at 07:11

## 2022-03-29 RX ADMIN — PRAVASTATIN SODIUM 80 MG: 20 TABLET ORAL at 17:39

## 2022-03-29 RX ADMIN — LEVALBUTEROL 1.25 MG: 1.25 SOLUTION, CONCENTRATE RESPIRATORY (INHALATION) at 07:11

## 2022-03-29 RX ADMIN — PANTOPRAZOLE SODIUM 20 MG: 20 TABLET, DELAYED RELEASE ORAL at 05:28

## 2022-03-29 RX ADMIN — LEVALBUTEROL 1.25 MG: 1.25 SOLUTION, CONCENTRATE RESPIRATORY (INHALATION) at 19:30

## 2022-03-29 RX ADMIN — ENOXAPARIN SODIUM 40 MG: 40 INJECTION SUBCUTANEOUS at 08:58

## 2022-03-29 RX ADMIN — LEVALBUTEROL 1.25 MG: 1.25 SOLUTION, CONCENTRATE RESPIRATORY (INHALATION) at 13:55

## 2022-03-29 RX ADMIN — AZITHROMYCIN 500 MG: 500 TABLET, FILM COATED ORAL at 08:56

## 2022-03-29 RX ADMIN — IPRATROPIUM BROMIDE 0.5 MG: 0.5 SOLUTION RESPIRATORY (INHALATION) at 19:30

## 2022-03-29 NOTE — CASE MANAGEMENT
Case Management Assessment & Discharge Planning Note    Patient name Violet Sanabria    Location CW2 213/CW2 213-02 MRN [id number]  : 1955 Date 3/29/2022       Current Admission Date: 3/27/2022  Current Admission Diagnosis:Acute on chronic respiratory failure with hypoxia and hypercapnia [facility])   Patient Active Problem List    Diagnosis Date Noted    Acute exacerbation of chronic obstructive pulmonary disease (COPD) ([facility] ) 2022    Acute on chronic respiratory failure with hypoxia and hypercapnia ([facility] ) 2022    Pulmonary nodules/lesions, multiple 2022    Former tobacco use 2022    Abnormal CT of the chest 2022    Prostate cancer ([place] [address] ) 2021    BPH with obstruction/lower urinary tract symptoms 2021    Chronic hypoxemic respiratory failure ([place] [address] ) 2020    Weight loss 2019    Macrocytosis without anemia 2019    Other insomnia 2019    Gastroesophageal reflux disease 2017    Coronary artery disease 2016    Mood disorder ([facility] ) 2015    Impaired fasting glucose 2015    Osteoporosis 10/14/2014    Vitamin D deficiency 10/14/2014    Cardiomyopathy, dilated ([place] [address] ) 2014    Hypercholesterolemia 2014    Chronic systolic congestive heart failure ([facility] ) 2013    Left bundle-branch block 2013    Osteoarthritis 2013    KEVIN and COPD overlap syndrome ([place] [address] ) 2013    Chronic obstructive pulmonary disease ([place] [address] ) 2012      LOS (days): 2  Geometric Mean LOS (GMLOS) (days): 3 60  Days to GMLOS:1 6     OBJECTIVE:    Risk of Unplanned Readmission Score: 15      Current admission status: Inpatient     Preferred Pharmacy:   [id number]  [facility] [address]  Phone: [phone] Fax: [phone]    Primary Care Provider: Gregor Her DO    Primary Insurance: MEDICARE  Secondary Insurance: BLUE CROSS    ASSESSMENT:  [address] Proxies     [facility] Representative - Spouse   Primary Phone: [phone] [facility]  Home Phone: [phone]               Advance Directives  Does patient have a [address]?: No  Was patient offered paperwork?: Yes (Declined)  Does patient currently have a Health Care decision maker?: Yes, please see Health Care Proxy section  Does patient have Advance Directives?: No  Was patient offered paperwork?: Yes  Primary Contact: Spouse Guy Rollins     Patient Information  Admitted from[de-identified] Home  Mental Status: Alert  During Assessment patient was accompanied by: Spouse,Daughter,Son  Assessment information provided by[de-identified] Patient  Primary Caregiver: Self  Support Systems: Spouse/significant [address] of Residence: [address] do you live in?: [place], [address] [place] [address] entry access options   Select all that apply : Stairs  Number of steps to enter home : 3  Do the steps have railings?: Yes  Type of Current Residence: [place]  In the last 12 months, was there a time when you were not able to pay the mortgage or rent on time?: No  In the last 12 months, was there a time when you did not have a steady place to sleep or slept in a shelter (including now)?: No  Homeless/housing insecurity resource given?: N/A  Living Arrangements: Lives w/ Son,Lives w/ Daughter,Lives w/ Spouse/significant other  Is patient a ?: No    Activities of Daily Living Prior to Admission  Functional Status: Independent  Completes ADLs independently?: Yes  Ambulates independently?: Yes  Does patient use assisted devices?: Yes  Assisted Devices (DME) used: Walker,Straight Cane,Portable Oxygen concentrator,Portable Oxygen tanks (Pt states he is currently in the process of obtaining a CPap )  DME Company Name (respiratory supplies): [employer]  O2 Rate(s): 3 L/min  Does patient currently own DME?: Yes  What DME does the patient currently own?: Walker,Straight Cane,Portable Oxygen concentrator,Portable Oxygen tanks  Does patient have a history of Outpatient Therapy (PT/OT)?: Yes  Does the patient have a history of Short-Term Rehab?: No  Does patient have a history of HHC?: No  Does patient currently have [facility]?: No    Patient Information Continued  Income Source: Pension/longterm  Does patient have prescription coverage?: Yes  Within the past 12 months, you worried that your food would run out before you got the money to buy more : Never true  Within the past 12 months, the food you bought just didnt last and you didnt have money to get more : Never true  Food insecurity resource given?: N/A  Does patient receive dialysis treatments?: No  Does patient have a history of substance abuse?: No  Does patient have a history of Mental Health Diagnosis?: No    Means of Transportation  Means of Transport to Appts[de-identified] Drives Self  In the past 12 months, has lack of transportation kept you from medical appointments or from getting medications?: No  In the past 12 months, has lack of transportation kept you from meetings, work, or from getting things needed for daily living?: No  Was application for public transport provided?: N/A    DISCHARGE DETAILS:    Discharge planning discussed with[de-identified] Patient at bedside  Freedom of Choice: Yes     CM contacted family/caregiver?: No- see comments (Declined)  Were Treatment Team discharge recommendations reviewed with patient/caregiver?: Yes  Did patient/caregiver verbalize understanding of patient care needs?: Yes  Were patient/caregiver advised of the risks associated with not following Treatment Team discharge recommendations?: Yes    Contacts  Patient Contacts: Michelle Barbosa  Contact Method:  In Person  Reason/Outcome: Discharge [address]         Is the patient interested in [facility] at discharge?: No    DME Referral Provided  Referral made for DME?: No    Treatment Team Recommendation: Home  Discharge Destination Plan[de-identified] Home  Transport at Discharge : Family (Spouse will transport )

## 2022-03-29 NOTE — ASSESSMENT & PLAN NOTE
· Admitted for COPD exacerbation  · Continue IV methylprednisolone 40mg Q12H for today   Anticipate transitioning to PO prednisone tomorrow  · Completed 3 day course of PO azithromcyin  · Continue nebs  · Pulmonology following  · Palliative care was consulted for goals of care discussion

## 2022-03-29 NOTE — ASSESSMENT & PLAN NOTE
· On most recent lung cancer screening CT he had a 7 mm patchy left lower lobe density, unlikely malignant  · Planned scan in 12 months, as outpatient

## 2022-03-29 NOTE — ASSESSMENT & PLAN NOTE
· Presented with worsening SOB and lethargy for one week prior to admission  Did not have improvement at home with nebs, steroids, azithromycin  · At baseline, on 3-4L NC  · On admission, required bipap  Admitted to critical care  · COVID/flu/RSV negative  · Started on nebs, IV steroids, ceftriaxone and azithromycin    · Now off standing bipap, still using QHS  · Currently on 3L NC

## 2022-03-29 NOTE — UTILIZATION REVIEW
Initial Clinical Review    Admission: Date/Time/Statement:   Admission Orders (From admission, onward)     Ordered        03/27/22 1021  Inpatient Admission  Once                      Orders Placed This Encounter   Procedures    Inpatient Admission     Standing Status:   Standing     Number of Occurrences:   1     Order Specific Question:   Level of Care     Answer:   Level 1 Stepdown [13]     Order Specific Question:   Estimated length of stay     Answer:   More than 2 Midnights     Order Specific Question:   Certification     Answer:   I certify that inpatient services are medically necessary for this patient for a duration of greater than two midnights  See H&P and MD Progress Notes for additional information about the patient's course of treatment  ED Arrival Information     Expected Arrival Acuity    - 3/27/2022 07:12 Emergent         Means of arrival Escorted by Service Admission type    [facility] Emergency         Arrival complaint    Shortness of Breath        Chief Complaint   Patient presents with    Decreased Oxygen Level     pulm hx, wears 3LPM at home arrived not on O2  75%RA  currently 93% on 6LPM  arrived with c/o SOB and increased WOB since yesterday  AOx3    Shortness of Breath       Initial Presentation: 61 y o  male , presented to the ED @ [facility] from home via walk in  Admitted as Inpatient due to Acute on Chronic respiratory failure due to hypoxia and hypercapnia requiting continuous BiPAP therapy in the setting of acute exacerbation of COPD  PMH of severe COPD, chronic hypoxemic and hypercapnic respiratory failure ( on O2 at home) , HFrEF, pulmonary nodules, KEVIN, GERD  Date: 03/27/2022  States that he woke this morning was feeling significantly short of breath with increased work of breathing  In the emergency department he was found to be significant respiratory distress and saturating 75% on room air    He is placed on 6 L nasal cannula with improvement in his oxygen saturation of 93%  He remained tachypneic with increased work of breathing and was started on BiPAP therapy with albuterol, steroids and ceftriaxone  Blood work was obtained which revealed pH 7 24, pCO2 of 85 5, serum bicarbonate of 36, negative troponins, lactic acid 2 0, procalcitonin 0 05, WBC count 11 56, negative influenza A/B, negative RSV, negative SARS-CoV-2  He was afebrile, tachycardic to 114 on admission, tachypneic to 34, on admission normotensive, saturating near 100% on BiPAP    Overnight, patient maintained saturation around %, alternating on BiPAP and full face mask  Afebrile, no tachycardia or tachypnea  ABG noted ph of 7 402 with pCO2 58 7 and HCO3 of 35 7  I/O +240 within last 24 hours  Day 2: 03/28/2022  While in ICCU, patient was started in acute COPD exacerbation treatment with Xopenex, Atrovent, IV solumedrol and PO Azithromycin, in addition to BiPAP at bedtime  03/29/2022    Continue Nebs, IV steroids, Ceftriaxone and azithromycin  Currently O2 @ 3-4 L, BiPAP HS  VTE Pharmacologic Prophylaxis:   Moderate Risk (Score 3-4) - Pharmacological DVT Prophylaxis Ordered: enoxaparin (Lovenox)      ED Triage Vitals   Temperature Pulse Respirations Blood Pressure SpO2   03/27/22 0732 03/27/22 0722 03/27/22 0722 03/27/22 0722 03/27/22 0722   97 5 °F (36 4 °C) (!) 114 (!) 32 152/76 97 %      Temp Source Heart Rate Source Patient Position - Orthostatic VS BP Location FiO2 (%)   03/27/22 1138 03/27/22 0800 03/27/22 0800 03/27/22 0800 --   Oral Monitor Lying Right arm       Pain Score       03/27/22 0732       No Pain          Wt Readings from Last 1 Encounters:   03/29/22 57 3 kg (126 lb 5 oz)     Additional Vital Signs:   Date/Time Temp Pulse Resp BP MAP (mmHg) SpO2 Calculated FIO2 (%) - Nasal Cannula Nasal Cannula O2 Flow Rate (L/min) O2 Device O2 Interface Device Patient Position - Orthostatic VS   03/29/22 15:30:56 98 1 °F (36 7 °C) 86 14 104/60 75 96 % -- -- -- -- --   03/29/22 08:57:47 -- 85 -- 104/60 75 94 % -- -- -- -- --   03/29/22 0856 -- 85 -- 104/60 -- -- -- -- -- -- --   03/29/22 08:24:27 -- 85 18 103/60 74 96 % -- -- -- -- --   03/29/22 0713 -- -- -- -- -- 94 % 32 3 L/min Nasal cannula -- --   03/29/22 0300 -- -- -- -- -- 96 % -- -- -- Full face mask --   03/28/22 2345 -- -- -- -- -- 95 % -- -- -- Full face mask --   03/28/22 23:15:56 98 6 °F (37 °C) 88 18 107/68 81 95 % -- -- -- -- --   03/28/22 22:27:25 99 1 °F (37 3 °C) 84 19 105/68 80 95 % -- -- -- -- --   03/28/22 1428 -- -- -- -- -- 92 % 32 3 L/min Nasal cannula -- --   03/28/22 1300 --  90 -- 111/71 91 95 % -- -- -- -- --   03/28/22 1200 97 8 °F (36 6 °C) -- -- -- -- -- -- -- -- -- --   03/28/22 1000 -- -- -- -- -- 90 % -- -- -- -- --   03/28/22 0816 97 7 °F (36 5 °C) 82 20 101/67 81 99 % 32 3 L/min Nasal cannula -- --   03/28/22 0803 -- -- -- -- -- 97 % -- -- -- -- --   03/28/22 0520 -- 80 -- 97/72 84 98 % -- -- -- -- --   03/28/22 0500 97 7 °F (36 5 °C) -- -- -- -- -- -- -- -- -- --   03/28/22 0319 -- -- -- -- -- 98 % -- -- -- Full face mask --   03/28/22 0030 -- 70 19 105/72 83 99 % -- -- BiPAP -- Lying   03/27/22 2301 97 9 °F (36 6 °C) -- -- -- -- -- -- -- -- -- --   03/27/22 2300 -- 70 -- 87/60 Abnormal  67 98 % -- -- -- -- --   03/27/22 2234 -- -- -- -- -- 100 % -- -- -- Full face mask --   03/27/22 2100 -- -- 24 Abnormal  -- -- -- -- -- -- -- --   03/27/22 2023 97 5 °F (36 4 °C) -- -- -- -- 99 % -- -- -- -- --   03/27/22 2000 -- 92 -- 110/72 95 98 % -- -- -- -- --   03/27/22 1736 -- 78 -- 93/60 70 99 % -- -- -- -- --   03/27/22 1600 98 4 °F (36 9 °C) 84 -- 89/50 Abnormal  67 97 % 32 3 L/min Nasal cannula -- --   03/27/22 1446 -- 72 -- 101/60 73 98 % -- -- -- -- --   03/27/22 1208 -- -- -- -- -- 99 % -- -- -- Full face mask --   03/27/22 1200 -- 78 -- -- -- -- -- -- -- -- --   03/27/22 1138 98 1 °F (36 7 °C) 88 22 101/60 71 98 % 32 3 L/min Nasal cannula -- Lying   03/27/22 1030 -- 78 34 Abnormal  105/64 78 99 % -- -- None (Room air) -- Lying   03/27/22 1000 -- 82 34 Abnormal  111/69 85 99 % -- -- BiPAP -- --   03/27/22 0930 -- 88 32 Abnormal  111/67 84 99 % -- -- BiPAP -- Lying   03/27/22 0900 -- 82 32 Abnormal  109/62 81 100 % -- -- BiPAP -- Lying   03/27/22 0815 -- -- -- -- -- 97 % -- -- -- Full face mask --   03/27/22 0800 -- 96 32 Abnormal  113/72 87 100 % -- -- BiPAP -- Lying   03/27/22 0732 97 5 °F (36 4 °C) -- -- -- -- -- -- -- Nasal cannula -- --   03/27/22 0722 -- 114 Abnormal  32 Abnormal  152/76 -- 97 % 44 6 L/min  Nasal cannula -- --     Date and Time Eye Opening Best Verbal Response Best Motor Response [place] Coma Scale Score   03/28/22 1200 4 5 6 15   03/28/22 0800 4 5 6 15   03/28/22 0030 4 5 6 15   03/27/22 2023 4 5 6 15   03/27/22 1600 4 5 6 15   03/27/22 1150 4 5 6 15   03/27/22 0732 4 5 6 15         Pertinent Labs/Diagnostic Test Results:   XR chest 1 view portable   ED Interpretation by Thuy Barron MD (03/27 0237)   Abnormal   COPD with Interstitial disease, possible whole left sided pneumonia   Final Result by Lidya Lunsford MD (03/27 1102)   COPD without acute acute cardiopulmonary disease          Results from last 7 days   Lab Units 03/27/22  0733   SARS-COV-2  Negative     Results from last 7 days   Lab Units 03/28/22  0606 03/27/22  0733   WBC Thousand/uL 8 88 11 56*   HEMOGLOBIN g/dL 12 3 14 8   HEMATOCRIT % 39 0 48 7   PLATELETS Thousands/uL 195 264   NEUTROS ABS Thousands/µL 7 99* 8 17*     Results from last 7 days   Lab Units 03/28/22  0527 03/27/22  0733   SODIUM mmol/L 135* 136   POTASSIUM mmol/L 4 2 4 3   CHLORIDE mmol/L 99* 98*   CO2 mmol/L 37* 36*   ANION GAP mmol/L -1* 2*   BUN mg/dL 26* 19   CREATININE mg/dL 0 84 1 05   EGFR ml/min/1 73sq m 92 74   CALCIUM mg/dL 8 7 9 7   MAGNESIUM mg/dL 2 2  --    PHOSPHORUS mg/dL 4 2*  --      Results from last 7 days   Lab Units 03/28/22  0527 03/27/22  0733   AST U/L 12 16   ALT U/L 21 27   ALK PHOS U/L 66 87   TOTAL PROTEIN g/dL 5 9* 7 5   ALBUMIN g/dL 2 9* 3 9   TOTAL BILIRUBIN mg/dL 0 70 0 93     Results from last 7 days   Lab Units 03/28/22  0527 03/27/22  0733   GLUCOSE RANDOM mg/dL 169* 147*      Results from last 7 days   Lab Units 03/28/22  0452   PH ART  7 402   PCO2 ART mm Hg 58 7*   PO2 ART mm Hg 111 6   HCO3 ART mmol/L 35 7*   BASE EXC ART mmol/L 9 0   O2 CONTENT ART mL/dL 17 8   O2 HGB, ARTERIAL % 97 3*   ABG SOURCE  Radial, Right     Results from last 7 days   Lab Units 03/27/22  0733   PH JHONATHAN  7 243*   PCO2 JHONATHAN mm Hg 85 5*   PO2 JHONATHAN mm Hg 25 0*   HCO3 JHONATHAN mmol/L 36 1*   BASE EXC JHONATHAN mmol/L 5 1   O2 CONTENT JHONATHAN ml/dL 8 4   O2 HGB, VENOUS % 38 5*     Results from last 7 days   Lab Units 03/27/22  1337 03/27/22  0930 03/27/22  0733   HS TNI 0HR ng/L  --   --  39   HS TNI 2HR ng/L  --  34  --    HSTNI D2 ng/L  --  -5  --    HS TNI 4HR ng/L 30  --   --    HSTNI D4 ng/L -9  --   --      Results from last 7 days   Lab Units 03/27/22  0825   PROTIME seconds 12 3   INR  0 95   PTT seconds 25     Results from last 7 days   Lab Units 03/28/22  0527 03/27/22  0733   PROCALCITONIN ng/ml <0 05 0 05     Results from last 7 days   Lab Units 03/27/22  2246 03/27/22  0733   LACTIC ACID mmol/L 2 0 2 0     Results from last 7 days   Lab Units 03/28/22  0146   CLARITY UA  Clear   COLOR UA  Light Yellow   SPEC GRAV UA  1 017   PH UA  6 0   GLUCOSE UA mg/dl 200 (1/5%)*   KETONES UA mg/dl Negative   BLOOD UA  Negative   PROTEIN UA mg/dl Negative   NITRITE UA  Negative   BILIRUBIN UA  Negative   UROBILINOGEN UA (BE) mg/dl <2 0   LEUKOCYTES UA  Negative   WBC UA /hpf None Seen   RBC UA /hpf None Seen   BACTERIA UA /hpf None Seen   EPITHELIAL CELLS WET PREP /hpf Occasional     Results from last 7 days   Lab Units 03/27/22  0733   INFLUENZA A PCR  Negative   INFLUENZA B PCR  Negative   RSV PCR  Negative     Results from last 7 days   Lab Units 03/27/22  0733   BLOOD CULTURE  No Growth at 48 hrs  No Growth at 48 hrs       ED Treatment:   Medication Administration from 03/27/2022 0712 to 03/27/2022 1124       Date/Time Order Dose Route Action     03/27/2022 0747 albuterol inhalation solution 10 mg 10 mg Nebulization Given by Other     03/27/2022 0747 ipratropium (ATROVENT) 0 02 % inhalation solution 1 mg 1 mg Nebulization Given by Other     03/27/2022 0747 sodium chloride 0 9 % inhalation solution 3 mL 3 mL Nebulization Given by Other     03/27/2022 0748 methylPREDNISolone sodium succinate (Solu-MEDROL) injection 125 mg 125 mg Intravenous Given     03/27/2022 0800 ceftriaxone (ROCEPHIN) 1 g/50 mL in dextrose IVPB 1,000 mg Intravenous New Bag        Past Medical History:   Diagnosis Date    Arthritis     Bladder mass     Cardiomyopathy ([facility] )     Chest pain     [facility]     CPAP (continuous positive airway pressure) dependence     Emphysema of lung ([facility]     Hypoxia     nocturnal    Left bundle branch block     Multiple pulmonary nodules     last assessed: 10/12/16    Pneumonia     Sleep apnea     Sleep apnea, obstructive     Smoker      Present on Admission:   KEVIN and COPD overlap syndrome ([facility] )   Coronary artery disease   Cardiomyopathy, dilated ([facility]   Chronic obstructive pulmonary disease ([facility]   Chronic systolic congestive heart failure ([facility]   Left bundle-branch block   Hypercholesterolemia   Gastroesophageal reflux disease   Impaired fasting glucose   Chronic hypoxemic respiratory failure ([facility]   Acute exacerbation of chronic obstructive pulmonary disease (COPD) ([facility]   Acute on chronic respiratory failure with hypoxia and hypercapnia ([facility])   Pulmonary nodules/lesions, multiple      Admitting Diagnosis: Shortness of breath [R06 02]  Macrocytosis without anemia [J84 66]  Chronic systolic congestive heart failure ([facility]) [I50 22]  COPD with acute exacerbation ([facility]) [J44 1]  Acute respiratory failure with hypoxia and hypercapnia ([facility]) [J96 01, J96 02]  Age/Sex: 61 y o  male  Admission Orders:  Dysphagia assessment > CV Diet with fld restriction 2000ml  Fall precautions  Up with assistance  Turn patient [id number]  Daily weight / I&O  Neuro checks q8h  Dg SCDs    Scheduled Medications:  budesonide, 0 5 mg, Nebulization, Q12H  enoxaparin, 40 mg, Subcutaneous, Daily  ergocalciferol, 50,000 Units, Oral, Q28 Days  ipratropium, 0 5 mg, Nebulization, TID  levalbuterol, 1 25 mg, Nebulization, TID  methylPREDNISolone sodium succinate, 40 mg, Intravenous, Q12H GABE  metoprolol succinate, 25 mg, Oral, Daily  pantoprazole, 20 mg, Oral, Early Morning  pravastatin, 80 mg, Oral, Daily With Dinner      Continuous IV Infusions:     PRN Meds:       IP CONSULT TO CASE MANAGEMENT  IP CONSULT TO PALLIATIVE CARE    Network Utilization Review Department  ATTENTION: Please call with any questions or concerns to [phone] and carefully listen to the prompts so that you are directed to the right person  All voicemails are confidential   Sheela Corey all requests for admission clinical reviews, approved or denied determinations and any other requests to dedicated fax number below belonging to the campus where the patient is receiving treatment   List of dedicated fax numbers for the Facilities:  [address] DENIALS (Administrative/Medical Necessity) [phone]   [address] (Maternity/NICU/Pediatrics) [phone] [address] [place] [address] [address] [place] [address] [address] [address]   [address] [phone]   [facility] [place] [address] [phone]

## 2022-03-29 NOTE — PROGRESS NOTES
[facility]  Progress Note - Vernida Leaks  1955, 61 y o  male MRN: [id number]  Unit/Bed#: CW2 213-02 Encounter: [id number]  Primary Care Provider: rGegor Her DO   Date and time admitted to hospital: 3/27/2022  7:14 AM    * Acute on chronic respiratory failure with hypoxia and hypercapnia ([facility] )  Assessment & Plan  · Presented with worsening SOB and lethargy for one week prior to admission  Did not have improvement at home with nebs, steroids, azithromycin  · At baseline, on 3-4L NC  · On admission, required bipap  Admitted to critical care  · COVID/flu/RSV negative  · Started on nebs, IV steroids, ceftriaxone and azithromycin  · Now off standing bipap, still using QHS  · Currently on 3L NC    Acute exacerbation of chronic obstructive pulmonary disease (COPD) ([facility] )  Assessment & Plan  · Admitted for COPD exacerbation  · Continue IV methylprednisolone 40mg Q12H for today  Anticipate transitioning to PO prednisone tomorrow  · Completed 3 day course of PO azithromcyin  · Continue nebs  · Pulmonology following  · Palliative care was consulted for goals of care discussion    Acute metabolic encephalopathy  Assessment & Plan  · Lethargy 2/2 hypercapnia, present on admission  · Now resolved  Mental status at baseline  Pulmonary nodules/lesions, multiple  Assessment & Plan  · On most recent lung cancer screening CT he had a 7 mm patchy left lower lobe density, unlikely malignant  · Planned scan in 12 months, as outpatient  KEVIN and COPD overlap syndrome ([facility] )  Assessment & Plan  · Supposed to be on CPAP QHS but does not have machine at home due to recall    Chronic systolic congestive heart failure [facility])  Assessment & Plan  Wt Readings from Last 3 Encounters:   03/29/22 57 3 kg (126 lb 5 oz)   01/13/22 56 kg (123 lb 6 4 oz)   12/16/21 58 5 kg (129 lb)       · EF 45-50%  · Secondary to dilated cardiomyopathy and LBBB  · Continue home Metoprolol 25 mg daily    · Not in acute exacerbation    Left bundle-branch block  Assessment & Plan  · Outpatient cardiology follow up    Hypercholesterolemia  Assessment & Plan  · Continue statin    Gastroesophageal reflux disease  Assessment & Plan  · Continue PPI    Impaired fasting glucose  Assessment & Plan  · Not on medications  · Continue to monitor, especially while on steroids    Chronic hypoxemic respiratory failure (HCC)  Assessment & Plan  · On 3-4L NC at baseline        VTE Pharmacologic Prophylaxis:   Moderate Risk (Score 3-4) - Pharmacological DVT Prophylaxis Ordered: enoxaparin (Lovenox)  Patient Centered Rounds: I performed bedside rounds with nursing staff today  Discussions with Specialists or Other Care Team Provider: none    Education and Discussions with Family / Patient: Updated  (wife) at bedside  Time Spent for Care: 45 minutes  More than 50% of total time spent on counseling and coordination of care as described above  Current Length of Stay: 2 day(s)  Current Patient Status: Inpatient   Certification Statement: The patient will continue to require additional inpatient hospital stay due to COPD exacerbation  Discharge Plan: Anticipate discharge tomorrow to home  Code Status: Level 3 - DNAR and DNI    Subjective:   States that he's feeling improved  Was able to walk to the bathroom without any shortness of breath  Thinks he's back to his baseline  Objective:     Vitals:   Temp (24hrs), Av 9 °F (37 2 °C), Min:98 6 °F (37 °C), Max:99 1 °F (37 3 °C)    Temp:  [98 6 °F (37 °C)-99 1 °F (37 3 °C)] 98 6 °F (37 °C)  HR:  [84-88] 85  Resp:  [18-19] 18  BP: (103-107)/(60-68) 104/60  SpO2:  [92 %-96 %] 94 %  Body mass index is 23 1 kg/m²  Input and Output Summary (last 24 hours):      Intake/Output Summary (Last 24 hours) at 3/29/2022 1425  Last data filed at 3/29/2022 0824  Gross per 24 hour   Intake 660 ml   Output 1350 ml   Net -690 ml       Physical Exam:   Physical Exam  Vitals and nursing note reviewed  Constitutional:       General: He is not in acute distress  Appearance: Normal appearance  Comments: On NC  HENT:      Head: Normocephalic  Mouth/Throat:      Mouth: Mucous membranes are moist    Eyes:      Extraocular Movements: Extraocular movements intact  Pupils: Pupils are equal, round, and reactive to light  Cardiovascular:      Rate and Rhythm: Normal rate and regular rhythm  Pulses: Normal pulses  Heart sounds: No murmur heard  Pulmonary:      Effort: Pulmonary effort is normal  No respiratory distress  Breath sounds: No wheezing  Comments: Poor air movement  Abdominal:      General: Bowel sounds are normal       Palpations: Abdomen is soft  Tenderness: There is no abdominal tenderness  Musculoskeletal:      Right lower leg: No edema  Left lower leg: No edema  Skin:     General: Skin is warm and dry  Neurological:      General: No focal deficit present  Mental Status: He is alert and oriented to person, place, and time  Mental status is at baseline     Psychiatric:         Mood and Affect: Mood normal          Behavior: Behavior normal           Additional Data:     Labs:  Results from last 7 days   Lab Units 03/28/22  0606   WBC Thousand/uL 8 88   HEMOGLOBIN g/dL 12 3   HEMATOCRIT % 39 0   PLATELETS Thousands/uL 195   NEUTROS PCT % 90*   LYMPHS PCT % 6*   MONOS PCT % 4   EOS PCT % 0     Results from last 7 days   Lab Units 03/28/22  0527   SODIUM mmol/L 135*   POTASSIUM mmol/L 4 2   CHLORIDE mmol/L 99*   CO2 mmol/L 37*   BUN mg/dL 26*   CREATININE mg/dL 0 84   ANION GAP mmol/L -1*   CALCIUM mg/dL 8 7   ALBUMIN g/dL 2 9*   TOTAL BILIRUBIN mg/dL 0 70   ALK PHOS U/L 66   ALT U/L 21   AST U/L 12   GLUCOSE RANDOM mg/dL 169*     Results from last 7 days   Lab Units 03/27/22  0825   INR  0 95             Results from last 7 days   Lab Units 03/28/22  0527 03/27/22  2246 03/27/22  0733   LACTIC ACID mmol/L  --  2 0 2 0 PROCALCITONIN ng/ml <0 05  --  0 05       Lines/Drains:  Invasive Devices  Report    Peripheral Intravenous Line            Peripheral IV 03/27/22 Left Antecubital 2 days    Peripheral IV 03/27/22 Right Antecubital 2 days                      Imaging: Reviewed radiology reports from this admission including: chest xray    Recent Cultures (last 7 days):   Results from last 7 days   Lab Units 03/27/22  0733   BLOOD CULTURE  No Growth at 48 hrs  No Growth at 48 hrs  Last 24 Hours Medication List:   Current Facility-Administered Medications   Medication Dose Route Frequency Provider Last Rate    budesonide  0 5 mg Nebulization Q12H Gabriela de Tobie Cockayne, MD      enoxaparin  40 mg Subcutaneous Daily Gabriela de Tobie Cockayne, MD      ergocalciferol  50,000 Units Oral Q28 Days Brady Christiansen MD      ipratropium  0 5 mg Nebulization TID Brady Christiansen MD      levalbuterol  1 25 mg Nebulization TID Brady Christiansen MD      methylPREDNISolone sodium succinate  40 mg Intravenous Q12H [facility] Tobie Cockayne, MD      metoprolol succinate  25 mg Oral Daily Gabriela de Tobie Cockayne, MD      pantoprazole  20 mg Oral Early Morning Gabriela de Tobie Cockayne, MD      pravastatin  80 mg Oral Daily With Layo Yost MD          Today, Patient Was Seen By: Paradise Jay MD    **Please Note: This note may have been constructed using a voice recognition system  **

## 2022-03-29 NOTE — ASSESSMENT & PLAN NOTE
Wt Readings from Last 3 Encounters:   03/29/22 57 3 kg (126 lb 5 oz)   01/13/22 56 kg (123 lb 6 4 oz)   12/16/21 58 5 kg (129 lb)       · EF 45-50%  · Secondary to dilated cardiomyopathy and LBBB  · Continue home Metoprolol 25 mg daily    · Not in acute exacerbation

## 2022-03-29 NOTE — SOCIAL WORK
LSW joined [facility]) to discuss advanced care planning documents    Wife is very busy today and requests that LSW return on Wednesday at 9:30 AM

## 2022-03-29 NOTE — PROGRESS NOTES
Progress note - Palliative and Supportive Care   Sabino Aguilar  61 y o  male [id number]    Patient Active Problem List   Diagnosis    Coronary artery disease    Cardiomyopathy, dilated ([facility] [address] )    Chronic obstructive pulmonary disease (HCC)    Chronic systolic congestive heart failure (HCC)    Left bundle-branch block    KEVIN and COPD overlap syndrome (HCC)    Hypercholesterolemia    Gastroesophageal reflux disease    Impaired fasting glucose    Osteoarthritis    Osteoporosis    Vitamin D deficiency    Mood disorder (HCC)    Other insomnia    Macrocytosis without anemia    Weight loss    Chronic hypoxemic respiratory failure (HCC)    BPH with obstruction/lower urinary tract symptoms    Prostate cancer (HCC)    Abnormal CT of the chest    Acute exacerbation of chronic obstructive pulmonary disease (COPD) ([facility] [address] )    Acute on chronic respiratory failure with hypoxia and hypercapnia (HCC)    Pulmonary nodules/lesions, multiple    Former tobacco use    Acute metabolic encephalopathy     Active issues specifically addressed today include:   Goals of care  COPD  HFrEF  dyspnea    Plan:  1  Symptom management -    - opioids and benzodiazepines deferred at this time as patient's work of breathing is improved both at rest and with ambulation   - could consider addition of low dose benzodiazepine or sleep aid HS if persistent poor tolerance of BiPAP/CPAP HS  2  Goals - level 3 DNR   - Ongoing medical optimization with goal to "stay around for his family as long as possible" with limit of DNR/DNI    - BRADFORD Gates to assist patient with 5 wishes  Patient and his wife ask to complete this tomorrow morning rather than today  Code Status: DNR - Level 3   Decisional apparatus:  Patient is competent on my exam today  If competence is lost, patient's substitute decision maker would default to spouse [place]  Advance Directive / Living Will / POLST:  5 wishes to be completed tomorrow    3  Social support   - Patient is supported by his spouse, Dung Khalil  They have 2 children (16 and 17)  - Patient enjoys talking about his dog  Interval history:       No events overnight  Patient was transferred out of [facility]  Struggled with sleep on BiPAP last night, but reports it was better with adjustments of the settings  Otherwise feels his breathing is better both at rest and on ambulation  No other complaints, is eager for medical stability and discharge  MEDICATIONS / ALLERGIES:     all current active meds have been reviewed    No Known Allergies    OBJECTIVE:    Physical Exam  Physical Exam  HENT:      Head: Normocephalic and atraumatic  Eyes:      Conjunctiva/sclera: Conjunctivae normal    Cardiovascular:      Rate and Rhythm: Normal rate  Pulmonary:      Effort: Pulmonary effort is normal  No respiratory distress  Comments: Supplemental O2  Abdominal:      General: There is no distension  Tenderness: There is no guarding  Skin:     General: Skin is warm and dry  Neurological:      General: No focal deficit present  Mental Status: He is alert  Mental status is at baseline  Psychiatric:         Mood and Affect: Mood normal          Behavior: Behavior normal          Thought Content: Thought content normal          Judgment: Judgment normal          Lab Results: I have personally reviewed pertinent labs  , CBC: No results found for: WBC, HGB, HCT, MCV, PLT, ADJUSTEDWBC, MCH, MCHC, RDW, MPV, NRBC, CMP: No results found for: SODIUM, K, CL, CO2, ANIONGAP, BUN, CREATININE, GLUCOSE, CALCIUM, AST, ALT, ALKPHOS, PROT, BILITOT, EGFR  Imaging Studies: reviewed pertinent studies  EKG, Pathology, and Other Studies: reviewed pertinent studies    Counseling / Coordination of Care    Total floor / unit time spent today 25+ minutes  Greater than 50% of total time was spent with the patient and / or family counseling and / or coordination of care   A description of the counseling / coordination of care: time spent assessing patient, communicating with RN, discussing ACP

## 2022-03-30 LAB
ANION GAP SERPL CALCULATED.3IONS-SCNC: 0 MMOL/L (ref 4–13)
BASOPHILS # BLD AUTO: 0 THOUSANDS/ΜL (ref 0–0.1)
BASOPHILS NFR BLD AUTO: 0 % (ref 0–1)
BUN SERPL-MCNC: 23 MG/DL (ref 5–25)
CALCIUM SERPL-MCNC: 8.9 MG/DL (ref 8.3–10.1)
CHLORIDE SERPL-SCNC: 100 MMOL/L (ref 100–108)
CO2 SERPL-SCNC: 35 MMOL/L (ref 21–32)
CREAT SERPL-MCNC: 0.83 MG/DL (ref 0.6–1.3)
EOSINOPHIL # BLD AUTO: 0 THOUSAND/ΜL (ref 0–0.61)
EOSINOPHIL NFR BLD AUTO: 0 % (ref 0–6)
ERYTHROCYTE [DISTWIDTH] IN BLOOD BY AUTOMATED COUNT: 12.8 % (ref 11.6–15.1)
GFR SERPL CREATININE-BSD FRML MDRD: 92 ML/MIN/1.73SQ M
GLUCOSE SERPL-MCNC: 184 MG/DL (ref 65–140)
HCT VFR BLD AUTO: 38.4 % (ref 36.5–49.3)
HGB BLD-MCNC: 12.3 G/DL (ref 12–17)
IMM GRANULOCYTES # BLD AUTO: 0.03 THOUSAND/UL (ref 0–0.2)
IMM GRANULOCYTES NFR BLD AUTO: 0 % (ref 0–2)
LYMPHOCYTES # BLD AUTO: 0.34 THOUSANDS/ΜL (ref 0.6–4.47)
LYMPHOCYTES NFR BLD AUTO: 5 % (ref 14–44)
MAGNESIUM SERPL-MCNC: 2.5 MG/DL (ref 1.6–2.6)
MCH RBC QN AUTO: 32 PG (ref 26.8–34.3)
MCHC RBC AUTO-ENTMCNC: 32 G/DL (ref 31.4–37.4)
MCV RBC AUTO: 100 FL (ref 82–98)
MONOCYTES # BLD AUTO: 0.48 THOUSAND/ΜL (ref 0.17–1.22)
MONOCYTES NFR BLD AUTO: 6 % (ref 4–12)
NEUTROPHILS # BLD AUTO: 6.72 THOUSANDS/ΜL (ref 1.85–7.62)
NEUTS SEG NFR BLD AUTO: 89 % (ref 43–75)
NRBC BLD AUTO-RTO: 0 /100 WBCS
PLATELET # BLD AUTO: 193 THOUSANDS/UL (ref 149–390)
PMV BLD AUTO: 9.9 FL (ref 8.9–12.7)
POTASSIUM SERPL-SCNC: 4.2 MMOL/L (ref 3.5–5.3)
RBC # BLD AUTO: 3.84 MILLION/UL (ref 3.88–5.62)
SODIUM SERPL-SCNC: 135 MMOL/L (ref 136–145)
WBC # BLD AUTO: 7.57 THOUSAND/UL (ref 4.31–10.16)

## 2022-03-30 PROCEDURE — 85025 COMPLETE CBC W/AUTO DIFF WBC: CPT | Performed by: STUDENT IN AN ORGANIZED HEALTH CARE EDUCATION/TRAINING PROGRAM

## 2022-03-30 PROCEDURE — 99232 SBSQ HOSP IP/OBS MODERATE 35: CPT

## 2022-03-30 PROCEDURE — 94760 N-INVAS EAR/PLS OXIMETRY 1: CPT

## 2022-03-30 PROCEDURE — 80048 BASIC METABOLIC PNL TOTAL CA: CPT | Performed by: STUDENT IN AN ORGANIZED HEALTH CARE EDUCATION/TRAINING PROGRAM

## 2022-03-30 PROCEDURE — 83735 ASSAY OF MAGNESIUM: CPT | Performed by: STUDENT IN AN ORGANIZED HEALTH CARE EDUCATION/TRAINING PROGRAM

## 2022-03-30 PROCEDURE — 94660 CPAP INITIATION&MGMT: CPT

## 2022-03-30 PROCEDURE — 94640 AIRWAY INHALATION TREATMENT: CPT

## 2022-03-30 PROCEDURE — 99233 SBSQ HOSP IP/OBS HIGH 50: CPT | Performed by: STUDENT IN AN ORGANIZED HEALTH CARE EDUCATION/TRAINING PROGRAM

## 2022-03-30 RX ORDER — ACETAMINOPHEN 325 MG/1
650 TABLET ORAL EVERY 6 HOURS PRN
Status: DISCONTINUED | OUTPATIENT
Start: 2022-03-30 | End: 2022-03-31 | Stop reason: HOSPADM

## 2022-03-30 RX ORDER — PREDNISONE 20 MG/1
40 TABLET ORAL DAILY
Status: DISCONTINUED | OUTPATIENT
Start: 2022-03-30 | End: 2022-03-31 | Stop reason: HOSPADM

## 2022-03-30 RX ORDER — LEVALBUTEROL INHALATION SOLUTION 1.25 MG/3ML
1.25 SOLUTION RESPIRATORY (INHALATION)
Status: DISCONTINUED | OUTPATIENT
Start: 2022-03-30 | End: 2022-03-31 | Stop reason: HOSPADM

## 2022-03-30 RX ORDER — IPRATROPIUM BROMIDE AND ALBUTEROL SULFATE 2.5; .5 MG/3ML; MG/3ML
3 SOLUTION RESPIRATORY (INHALATION) EVERY 6 HOURS PRN
Status: DISCONTINUED | OUTPATIENT
Start: 2022-03-30 | End: 2022-03-31 | Stop reason: HOSPADM

## 2022-03-30 RX ADMIN — IPRATROPIUM BROMIDE 0.5 MG: 0.5 SOLUTION RESPIRATORY (INHALATION) at 07:49

## 2022-03-30 RX ADMIN — BUDESONIDE 0.5 MG: 0.5 INHALANT ORAL at 07:49

## 2022-03-30 RX ADMIN — IPRATROPIUM BROMIDE AND ALBUTEROL SULFATE 3 ML: 2.5; .5 SOLUTION RESPIRATORY (INHALATION) at 20:19

## 2022-03-30 RX ADMIN — PRAVASTATIN SODIUM 80 MG: 20 TABLET ORAL at 17:06

## 2022-03-30 RX ADMIN — IPRATROPIUM BROMIDE 0.5 MG: 0.5 SOLUTION RESPIRATORY (INHALATION) at 15:29

## 2022-03-30 RX ADMIN — BUDESONIDE 0.5 MG: 0.5 INHALANT ORAL at 20:19

## 2022-03-30 RX ADMIN — LEVALBUTEROL 1.25 MG: 1.25 SOLUTION, CONCENTRATE RESPIRATORY (INHALATION) at 07:49

## 2022-03-30 RX ADMIN — ENOXAPARIN SODIUM 40 MG: 40 INJECTION SUBCUTANEOUS at 11:36

## 2022-03-30 RX ADMIN — PREDNISONE 40 MG: 20 TABLET ORAL at 11:36

## 2022-03-30 RX ADMIN — PANTOPRAZOLE SODIUM 20 MG: 20 TABLET, DELAYED RELEASE ORAL at 05:50

## 2022-03-30 RX ADMIN — LEVALBUTEROL HYDROCHLORIDE 1.25 MG: 1.25 SOLUTION RESPIRATORY (INHALATION) at 15:29

## 2022-03-30 RX ADMIN — IPRATROPIUM BROMIDE 0.5 MG: 0.5 SOLUTION RESPIRATORY (INHALATION) at 20:19

## 2022-03-30 NOTE — SOCIAL WORK
LSW joined Dr Naheed Rowan, Lyndon Massey (DELIA), and Hiren Cleaning ([facility]I) to discuss patient's current medical status, next steps prior to discharge, and follow-up following hospitalization        Patient and wife are agreeable to outpatient Palliative follow-up with Practitioner and     Patient and wife request not to complete 5 wishes at this time

## 2022-03-30 NOTE — PROGRESS NOTES
Progress note - Palliative and Supportive Care   Karsten Wright  61 y o  male [id number]    Patient Active Problem List   Diagnosis    Coronary artery disease    Cardiomyopathy, dilated ([facility] [address] )    Chronic obstructive pulmonary disease (HCC)    Chronic systolic congestive heart failure (HCC)    Left bundle-branch block    KEVIN and COPD overlap syndrome (HCC)    Hypercholesterolemia    Gastroesophageal reflux disease    Impaired fasting glucose    Osteoarthritis    Osteoporosis    Vitamin D deficiency    Mood disorder (HCC)    Other insomnia    Macrocytosis without anemia    Weight loss    Chronic hypoxemic respiratory failure (HCC)    BPH with obstruction/lower urinary tract symptoms    Prostate cancer (HCC)    Abnormal CT of the chest    Acute exacerbation of chronic obstructive pulmonary disease (COPD) ([facility] [address] )    Acute on chronic respiratory failure with hypoxia and hypercapnia (HCC)    Pulmonary nodules/lesions, multiple    Former tobacco use    Acute metabolic encephalopathy     Active issues specifically addressed today include:   COPD  Goals of Care  HFrEF  Dyspnea    Plan:  1  Symptom management -  Continue to defer benzodiazepines due to sleep study pending tonight  2  Goals -     Pt anxious to go home and return to former activities  He is distressed by having to be in bed and unable to walk around at will  Plan for d/c 3/31 after sleep study, PT/OT evaluations and VNA referral    Plan outpatient f/u with Palliative Care  Plan to continue to offer assistance with 5 Wishes  Code Status: DNAR/DNI - Level 3   Decisional apparatus:  Patient is competent on my exam today  If competence is lost, patient's substitute decision maker would default to wife Dung Khalil [place] 169  Advance Directive / Living Will / POLST:  Attempted to to 5 Wishes with [facility]  was deferred again due to more immediate concern of pt and wife for more information       Interval history:  Pt and wife seen at bedside with plan to do 5 Wishes, however, pt is frustrated about not receiving information about his medical situation  He is intent on returning home ASAP and welcomes PT/OT for strengthening and increasing function  Meeting convened with Attending, pt, wife, CM and [facility]  Plan for above goals were discussed with Olga Calzada and wife Arthur Ferrara  They both verbalize understanding and agreement  Patient hopes to not return to the hospital         MEDICATIONS / ALLERGIES:     all current active meds have been reviewed    No Known Allergies    OBJECTIVE:    Physical Exam  Physical Exam  Constitutional:       General: He is not in acute distress  Appearance: He is normal weight  HENT:      Head: Normocephalic  Mouth/Throat:      Mouth: Mucous membranes are moist    Eyes:      Extraocular Movements: Extraocular movements intact  Cardiovascular:      Rate and Rhythm: Normal rate and regular rhythm  Pulmonary:      Comments: O2 via NC   Abdominal:      Palpations: Abdomen is soft  Musculoskeletal:         General: Normal range of motion  Cervical back: Normal range of motion  Right lower leg: No edema  Left lower leg: No edema  Skin:     General: Skin is warm  Neurological:      General: No focal deficit present  Mental Status: He is alert and oriented to person, place, and time  Psychiatric:         Mood and Affect: Mood normal          Behavior: Behavior normal          Lab Results:   I have personally reviewed pertinent labs  , CBC:   Lab Results   Component Value Date    WBC 7 57 03/30/2022    HGB 12 3 03/30/2022    HCT 38 4 03/30/2022     (H) 03/30/2022     03/30/2022    MCH 32 0 03/30/2022    MCHC 32 0 03/30/2022    RDW 12 8 03/30/2022    MPV 9 9 03/30/2022    NRBC 0 03/30/2022   , BMP:  Lab Results   Component Value Date    SODIUM 135 (L) 03/30/2022    K 4 2 03/30/2022     03/30/2022    CO2 35 (H) 03/30/2022    BUN 23 03/30/2022    CREATININE 0 83 03/30/2022    GLUC 184 (H) 03/30/2022    CALCIUM 8 9 03/30/2022    AGAP 0 (L) 03/30/2022    EGFR 92 03/30/2022     Imaging Studies: I have reviewed all pertinent imaging  EKG, Pathology, and Other Studies: No new studies  Counseling / Coordination of Care    Total floor / unit time spent today 25  minutes  Greater than 50% of total time was spent with the patient and / or family counseling and / or coordination of care  A description of the counseling / coordination of care: Chart reviewed,  provided medical updates, determined goals of care, discussed palliative care and symptom management, determined competency and POA/HCA, determined social/family support, provided psychosocial support  Reviewed with attending, SW and CM      Matthew Weinberg, MSN, [address] [place] [address]

## 2022-03-30 NOTE — PROGRESS NOTES
[facility]  Progress Note - Vernida Leaks  1955, 61 y o  male MRN: [id number]  Unit/Bed#: CW2 213-02 Encounter: [id number]  Primary Care Provider: Gregor Her DO   Date and time admitted to hospital: 3/27/2022  7:14 AM    * Acute on chronic respiratory failure with hypoxia and hypercapnia ([place] [address] )  Assessment & Plan  · Presented with worsening SOB and lethargy for one week prior to admission  Did not have improvement at home with nebs, steroids, azithromycin  · At baseline, on 3-4L NC  · On admission, required bipap  Admitted to critical care  · COVID/flu/RSV negative  · Started on nebs, IV steroids, ceftriaxone and azithromycin  · Now off standing bipap, still using QHS  · Currently on 3L NC    Acute exacerbation of chronic obstructive pulmonary disease (COPD) ([place] [address] )  Assessment & Plan  · Admitted for COPD exacerbation  Known severe COPD  · S/p IV methylprednisolone 40mg Q12H for today  Start PO prednisone 40mg daily, may need prolonged taper at discharge  · Completed 3 day course of PO azithromcyin  · Continue nebs standing and PRN  · Palliative care was consulted for goals of care discussion, will plan to follow up with patient as an outpatient   · PT/OT consulted  · Overnight pulse oximetry test for bipap qualification with ABG in the AM    Acute metabolic encephalopathy  Assessment & Plan  · Lethargy 2/2 hypercapnia, present on admission  · Now resolved  Mental status at baseline  Pulmonary nodules/lesions, multiple  Assessment & Plan  · On most recent lung cancer screening CT he had a 7 mm patchy left lower lobe density, unlikely malignant  · Planned scan in 12 months, as outpatient  KEVIN and COPD overlap syndrome ([place] [address] )  Assessment & Plan  · Supposed to be on CPAP QHS but does not have machine at home due to recall  · Currently on Bipap QHS  · Plan for overnight pulse ox study and AM ABG to see if qualifies for home bipap  Case management aware  Chronic systolic congestive heart failure [facility])  Assessment & Plan  Wt Readings from Last 3 Encounters:   03/30/22 58 6 kg (129 lb 4 oz)   01/13/22 56 kg (123 lb 6 4 oz)   12/16/21 58 5 kg (129 lb)       · EF 45-50%  · Secondary to dilated cardiomyopathy and LBBB  · Continue home Metoprolol 25 mg daily  · Not in acute exacerbation  · Will remove dietary fluid restriction    Left bundle-branch block  Assessment & Plan  · Outpatient cardiology follow up    Hypercholesterolemia  Assessment & Plan  · Continue statin    Gastroesophageal reflux disease  Assessment & Plan  · Continue PPI    Impaired fasting glucose  Assessment & Plan  · Not on medications  · Continue to monitor, especially while on steroids    Chronic hypoxemic respiratory failure (HCC)  Assessment & Plan  · On 3-4L NC at baseline        VTE Pharmacologic Prophylaxis:   Moderate Risk (Score 3-4) - Pharmacological DVT Prophylaxis Ordered: enoxaparin (Lovenox)  Patient Centered Rounds: I performed bedside rounds with nursing staff today  Discussions with Specialists or Other Care Team Provider: palliative care, case management    Education and Discussions with Family / Patient: Updated  (wife) at bedside  Time Spent for Care: 45 minutes  More than 50% of total time spent on counseling and coordination of care as described above  Current Length of Stay: 3 day(s)  Current Patient Status: Inpatient   Certification Statement: The patient will continue to require additional inpatient hospital stay due to COPD management  Discharge Plan: Anticipate discharge tomorrow to home with home services  Code Status: Level 3 - DNAR and DNI    Subjective:   States that he feels he is at his baseline and asking when he can go home  Met with patient and his wife at bedside with palliative care team (Niki Gerber, and DavidCathy) and case management Baldwin Closs) to discuss plan of care   Plan for overnight sleep study tonight in order to get bipap ordered, as well as PT/OT evaluations  Hope for discharge home tomorrow  Discussed that palliative care will continue to follow up with patient as an outpatient  Objective:     Vitals:   Temp (24hrs), Av 9 °F (36 6 °C), Min:97 8 °F (36 6 °C), Max:98 1 °F (36 7 °C)    Temp:  [97 8 °F (36 6 °C)-98 1 °F (36 7 °C)] 97 9 °F (36 6 °C)  HR:  [77-86] 85  Resp:  [14-18] 18  BP: (102-109)/(60-71) 102/64  SpO2:  [96 %-98 %] 98 %  Body mass index is 23 64 kg/m²  Input and Output Summary (last 24 hours): Intake/Output Summary (Last 24 hours) at 3/30/2022 1402  Last data filed at 3/30/2022 0916  Gross per 24 hour   Intake 120 ml   Output 1400 ml   Net -1280 ml       Physical Exam:   Physical Exam  Vitals and nursing note reviewed  Constitutional:       General: He is not in acute distress  Appearance: Normal appearance  Comments: Thin elderly man  HENT:      Head: Normocephalic  Mouth/Throat:      Mouth: Mucous membranes are moist    Eyes:      Extraocular Movements: Extraocular movements intact  Pupils: Pupils are equal, round, and reactive to light  Cardiovascular:      Rate and Rhythm: Normal rate and regular rhythm  Pulses: Normal pulses  Heart sounds: No murmur heard  Pulmonary:      Effort: Pulmonary effort is normal       Breath sounds: Wheezing (Mild inspiratory wheezes) present  Abdominal:      General: Bowel sounds are normal       Palpations: Abdomen is soft  Tenderness: There is no abdominal tenderness  Musculoskeletal:      Right lower leg: No edema  Left lower leg: No edema  Skin:     General: Skin is warm and dry  Neurological:      General: No focal deficit present  Mental Status: He is alert and oriented to person, place, and time  Mental status is at baseline     Psychiatric:         Mood and Affect: Mood normal          Behavior: Behavior normal           Additional Data:     Labs:  Results from last 7 days   Lab Units 22  0562 WBC Thousand/uL 7 57   HEMOGLOBIN g/dL 12 3   HEMATOCRIT % 38 4   PLATELETS Thousands/uL 193   NEUTROS PCT % 89*   LYMPHS PCT % 5*   MONOS PCT % 6   EOS PCT % 0     Results from last 7 days   Lab Units 03/30/22  0511 03/28/22  0527 03/28/22  0527   SODIUM mmol/L 135*   < > 135*   POTASSIUM mmol/L 4 2   < > 4 2   CHLORIDE mmol/L 100   < > 99*   CO2 mmol/L 35*   < > 37*   BUN mg/dL 23   < > 26*   CREATININE mg/dL 0 83   < > 0 84   ANION GAP mmol/L 0*   < > -1*   CALCIUM mg/dL 8 9   < > 8 7   ALBUMIN g/dL  --   --  2 9*   TOTAL BILIRUBIN mg/dL  --   --  0 70   ALK PHOS U/L  --   --  66   ALT U/L  --   --  21   AST U/L  --   --  12   GLUCOSE RANDOM mg/dL 184*   < > 169*    < > = values in this interval not displayed  Results from last 7 days   Lab Units 03/27/22  0825   INR  0 95             Results from last 7 days   Lab Units 03/28/22  0527 03/27/22  2246 03/27/22  0733   LACTIC ACID mmol/L  --  2 0 2 0   PROCALCITONIN ng/ml <0 05  --  0 05       Lines/Drains:  Invasive Devices  Report    Peripheral Intravenous Line            Peripheral IV 03/27/22 Left Antecubital 3 days    Peripheral IV 03/27/22 Right Antecubital 3 days                      Imaging: No pertinent imaging reviewed  Recent Cultures (last 7 days):   Results from last 7 days   Lab Units 03/27/22  0733   BLOOD CULTURE  No Growth at 72 hrs  No Growth at 72 hrs         Last 24 Hours Medication List:   Current Facility-Administered Medications   Medication Dose Route Frequency Provider Last Rate    acetaminophen  650 mg Oral Q6H PRN Cornell Melendez MD      budesonide  0 5 mg Nebulization Q12H Acacia Reid MD      enoxaparin  40 mg Subcutaneous Daily Bg Drew MD      ergocalciferol  50,000 Units Oral Q28 Days Bg Drew MD      ipratropium  0 5 mg Nebulization TID Bg Drew MD      ipratropium-albuterol  3 mL Nebulization Q6H PRN Cornell Melendez MD  levalbuterol  1 25 mg Nebulization TID Brant Key MD      metoprolol succinate  25 mg Oral Daily Acacia Acuna MD      pantoprazole  20 mg Oral Early Morning Acacia Acuna MD      pravastatin  80 mg Oral Daily With [address]  May Capellan MD      predniSONE  40 mg Oral Daily Maritza Pineda MD          Today, Patient Was Seen By: Maritza Pineda MD    **Please Note: This note may have been constructed using a voice recognition system  **

## 2022-03-30 NOTE — ASSESSMENT & PLAN NOTE
· Supposed to be on CPAP QHS but does not have machine at home due to recall  · Currently on Bipap QHS  · Plan for overnight pulse ox study and AM ABG to see if qualifies for home bipap  Case management aware

## 2022-03-30 NOTE — ASSESSMENT & PLAN NOTE
· Admitted for COPD exacerbation  Known severe COPD  · S/p IV methylprednisolone 40mg Q12H for today   Start PO prednisone 40mg daily, may need prolonged taper at discharge  · Completed 3 day course of PO azithromcyin  · Continue nebs standing and PRN  · Palliative care was consulted for goals of care discussion, will plan to follow up with patient as an outpatient   · PT/OT consulted  · Overnight pulse oximetry test for bipap qualification with ABG in the AM

## 2022-03-30 NOTE — ASSESSMENT & PLAN NOTE
Wt Readings from Last 3 Encounters:   03/30/22 58 6 kg (129 lb 4 oz)   01/13/22 56 kg (123 lb 6 4 oz)   12/16/21 58 5 kg (129 lb)       · EF 45-50%  · Secondary to dilated cardiomyopathy and LBBB  · Continue home Metoprolol 25 mg daily    · Not in acute exacerbation  · Will remove dietary fluid restriction

## 2022-03-30 NOTE — PROGRESS NOTES
03/30/22 1121   Team Meeting   Meeting Type Tx Team Meeting   Initial Conference Date 03/30/22   Team Members Present   Team Members Present Physician;;; Other (Discipline and Name)   Physician Team Member Dr Yakov Vivas (Hospitalist)   Care Management Team Member Jordan Hooker [facility] (Inpatient )   Social Work Team Member Jerri Granda [facility] ([address])   Other (Discipline and Name) Batsheva Regalado MSN, NP-C (Palliative Care)   Patient/Family Present   Patient Present Yes   Patient's Family Present Yes   Family Relationship Spouse   Spouse Kane Armenta   OTHER   Team Meeting - Additional Comments Team meeting held to discuss patient's medical status and review discharge plan  Palliative team members were present to provide support and advocacy, and to also encourage OP follow-up for ongoing treatment team interventions  Per provider, pt is medically stable for discharge pending BiPap arrangements and PT/OT evaluations  Prior to discharge, pt must have an overnight pulse oximetry study to determine BiPap needs and place order  CM will anticipate d/c to home tomorrow  Patient and spouse in agreement  CM will update [facility] on discharge date, as well as any PT/OT needs pending evaluations

## 2022-03-31 VITALS
OXYGEN SATURATION: 96 % | WEIGHT: 129.06 LBS | SYSTOLIC BLOOD PRESSURE: 128 MMHG | RESPIRATION RATE: 19 BRPM | HEIGHT: 62 IN | HEART RATE: 94 BPM | BODY MASS INDEX: 23.75 KG/M2 | DIASTOLIC BLOOD PRESSURE: 79 MMHG | TEMPERATURE: 97.9 F

## 2022-03-31 LAB
ARTERIAL PATENCY WRIST A: NO
BASE EXCESS BLDA CALC-SCNC: 7.6 MMOL/L
DME PARACHUTE DELIVERY DATE ACTUAL: NORMAL
DME PARACHUTE DELIVERY DATE EXPECTED: NORMAL
DME PARACHUTE DELIVERY DATE REQUESTED: NORMAL
DME PARACHUTE ITEM DESCRIPTION: NORMAL
DME PARACHUTE ORDER STATUS: NORMAL
DME PARACHUTE SUPPLIER NAME: NORMAL
DME PARACHUTE SUPPLIER PHONE: NORMAL
HCO3 BLDA-SCNC: 33.4 MMOL/L (ref 22–28)
NASAL CANNULA: 3
O2 CT BLDA-SCNC: 17.7 ML/DL (ref 16–23)
OXYHGB MFR BLDA: 97.5 % (ref 94–97)
PCO2 BLDA: 52.5 MM HG (ref 36–44)
PH BLDA: 7.42 [PH] (ref 7.35–7.45)
PO2 BLDA: 118.9 MM HG (ref 75–129)
SPECIMEN SOURCE: ABNORMAL

## 2022-03-31 PROCEDURE — 97166 OT EVAL MOD COMPLEX 45 MIN: CPT

## 2022-03-31 PROCEDURE — 97162 PT EVAL MOD COMPLEX 30 MIN: CPT

## 2022-03-31 PROCEDURE — 82805 BLOOD GASES W/O2 SATURATION: CPT | Performed by: STUDENT IN AN ORGANIZED HEALTH CARE EDUCATION/TRAINING PROGRAM

## 2022-03-31 PROCEDURE — 94760 N-INVAS EAR/PLS OXIMETRY 1: CPT

## 2022-03-31 PROCEDURE — 94762 N-INVAS EAR/PLS OXIMTRY CONT: CPT

## 2022-03-31 PROCEDURE — 94640 AIRWAY INHALATION TREATMENT: CPT

## 2022-03-31 PROCEDURE — 36600 WITHDRAWAL OF ARTERIAL BLOOD: CPT

## 2022-03-31 PROCEDURE — 99239 HOSP IP/OBS DSCHRG MGMT >30: CPT | Performed by: STUDENT IN AN ORGANIZED HEALTH CARE EDUCATION/TRAINING PROGRAM

## 2022-03-31 RX ORDER — ASPIRIN 81 MG/1
81 TABLET, CHEWABLE ORAL DAILY
Qty: 30 TABLET | Refills: 0 | Status: SHIPPED | OUTPATIENT
Start: 2022-04-01 | End: 2023-04-01

## 2022-03-31 RX ORDER — ALBUTEROL SULFATE 90 UG/1
2 AEROSOL, METERED RESPIRATORY (INHALATION) EVERY 6 HOURS PRN
Qty: 8.5 G | Refills: 0 | Status: SHIPPED | OUTPATIENT
Start: 2022-03-31 | End: 2022-06-01

## 2022-03-31 RX ORDER — ALBUTEROL SULFATE 2.5 MG/3ML
2.5 SOLUTION RESPIRATORY (INHALATION) 4 TIMES DAILY
Qty: 360 ML | Refills: 0 | Status: SHIPPED | OUTPATIENT
Start: 2022-03-31 | End: 2022-04-06

## 2022-03-31 RX ORDER — METOPROLOL SUCCINATE 25 MG/1
25 TABLET, EXTENDED RELEASE ORAL DAILY
Qty: 30 TABLET | Refills: 0 | Status: SHIPPED | OUTPATIENT
Start: 2022-03-31 | End: 2023-03-31

## 2022-03-31 RX ORDER — PREDNISONE 10 MG/1
TABLET ORAL
Qty: 22 TABLET | Refills: 0 | Status: SHIPPED | OUTPATIENT
Start: 2022-04-01 | End: 2022-04-06

## 2022-03-31 RX ADMIN — IPRATROPIUM BROMIDE AND ALBUTEROL SULFATE 3 ML: 2.5; .5 SOLUTION RESPIRATORY (INHALATION) at 18:20

## 2022-03-31 RX ADMIN — ENOXAPARIN SODIUM 40 MG: 40 INJECTION SUBCUTANEOUS at 09:12

## 2022-03-31 RX ADMIN — BUDESONIDE 0.5 MG: 0.5 INHALANT ORAL at 08:27

## 2022-03-31 RX ADMIN — LEVALBUTEROL HYDROCHLORIDE 1.25 MG: 1.25 SOLUTION RESPIRATORY (INHALATION) at 14:54

## 2022-03-31 RX ADMIN — PREDNISONE 40 MG: 20 TABLET ORAL at 09:12

## 2022-03-31 RX ADMIN — IPRATROPIUM BROMIDE 0.5 MG: 0.5 SOLUTION RESPIRATORY (INHALATION) at 08:27

## 2022-03-31 RX ADMIN — LEVALBUTEROL HYDROCHLORIDE 1.25 MG: 1.25 SOLUTION RESPIRATORY (INHALATION) at 08:27

## 2022-03-31 RX ADMIN — METOPROLOL SUCCINATE 25 MG: 25 TABLET, FILM COATED, EXTENDED RELEASE ORAL at 09:12

## 2022-03-31 RX ADMIN — PRAVASTATIN SODIUM 80 MG: 20 TABLET ORAL at 17:51

## 2022-03-31 RX ADMIN — PANTOPRAZOLE SODIUM 20 MG: 20 TABLET, DELAYED RELEASE ORAL at 05:08

## 2022-03-31 RX ADMIN — IPRATROPIUM BROMIDE 0.5 MG: 0.5 SOLUTION RESPIRATORY (INHALATION) at 14:54

## 2022-03-31 NOTE — ASSESSMENT & PLAN NOTE
Wt Readings from Last 3 Encounters:   03/31/22 58 5 kg (129 lb 1 oz)   01/13/22 56 kg (123 lb 6 4 oz)   12/16/21 58 5 kg (129 lb)       · EF 45-50%  · Secondary to dilated cardiomyopathy and LBBB  · Continue home Metoprolol 25 mg daily    · Not in acute exacerbation

## 2022-03-31 NOTE — DISCHARGE INSTRUCTIONS
You were admitted for a COPD exacerbation  You will be discharged on a prolonged steroid taper  You will need to follow up with your pulmonologist and with palliative care as an outpatient  You have been setup for a bipap at night for home  COPD (Chronic Obstructive Pulmonary Disease)   WHAT YOU NEED TO KNOW:   COPD (chronic obstructive pulmonary disease) can get worse quickly  Your healthcare providers will help you create a care plan to use at home  The plan will give directions on how to prevent or manage shortness of breath  Your family members or anyone who cares for you will also get directions to help you  DISCHARGE INSTRUCTIONS:   Call your local emergency number (911 in the [address] [facility]) if:   · You feel lightheaded, short of breath, and have chest pain  Seek care immediately if:   · You cough up blood  · You are confused, dizzy, or feel faint  · Your arm or leg feels warm, tender, and painful  It may look swollen and red  Call your doctor if:   · You have increased shortness of breath  · You need more medicine than usual to control your symptoms  · You are coughing or wheezing more than usual     · You are coughing up more mucus, or it has a new color or odor  · You gain more than 3 pounds in a week  · You have a fever, a runny or stuffy nose, and a sore throat, or other cold or flu symptoms  · Your skin, lips, or nails start to turn blue  · You have swelling in your legs or ankles  · You are very tired or weak for more than a day  · You notice changes in your mood, or changes in your ability to think or concentrate  · You have questions or concerns about your condition or care  Medicines:   · Short-acting bronchodilators  may be called rescue inhalers or relievers  They relieve sudden, severe symptoms and start to work right away  · Long-acting bronchodilators  may be called controllers or maintenance medicine   This medicine helps open the airway over time, and is used to decrease and prevent breathing problems  Long-acting bronchodilators should not be used to treat sudden, severe symptoms, such as trouble breathing  · Antibiotics may be given for up to 5 days to treat a bacterial infection during an exacerbation  · Take your medicine as directed  Contact your healthcare provider if you think your medicine is not helping or if you have side effects  Tell him or her if you are allergic to any medicine  Keep a list of the medicines, vitamins, and herbs you take  Include the amounts, and when and why you take them  Bring the list or the pill bottles to follow-up visits  Carry your medicine list with you in case of an emergency  Help make breathing easier:   · Use pursed-lip breathing any time you feel short of breath  Take a deep breath in through your nose  Slowly breathe out through your mouth with your lips pursed  Try to take 2 times as long to breathe out as to breathe in  This helps you get rid of as much air from your lungs as possible  You can also practice this breathing pattern while you bend, lift, climb stairs, or exercise  It slows down your breathing and helps move more air in and out of your lungs  · Avoid anything that makes your symptoms worse  Stay out of high altitudes and places with high humidity  Stay inside, or cover your mouth and nose with a scarf when you are outside in cold weather  Stay inside on days when air pollution or pollen counts are high  Do not use aerosol sprays such as deodorant, bug spray, and hairspray  · Exercise as directed  Your healthcare provider may recommend at least 20 minutes of exercise each day to help increase your energy and decrease shortness of breath  Talk to your provider about the best exercise plan for you  Manage COPD and help prevent exacerbations:  COPD is a serious condition that gets worse over time  A COPD exacerbation means your symptoms suddenly get worse   It is important to prevent exacerbations  An exacerbation can cause more lung damage  COPD cannot be cured, but you can take action to feel better and prevent exacerbations:  · Do not smoke  Nicotine and other chemicals in cigarettes and cigars can cause lung damage and make your COPD worse  Ask your healthcare provider for information if you currently smoke and need help to quit  E-cigarettes or smokeless tobacco still contain nicotine  Talk to your healthcare provider before you use these products  · Avoid secondhand smoke  This is smoke another person exhales  Even if you have never smoked or have quit, it is important to avoid secondhand smoke  This smoke can also cause lung damage or trigger an exacerbation  · Go to pulmonary rehabilitation (rehab) if directed  Rehab is a program run by specialists who help you learn to manage COPD  Examples include a pulmonologist (lung specialist), dietitian, or exercise therapist  The specialists will help you make a plan to avoid triggers that cause an exacerbation  · Take your medicines as directed  Refill your medicines before you are out so that you do not miss a dose  Ask your healthcare provider if you have any questions on how to take your medicines  · Protect yourself from germs  Germs can get into your lungs and cause an infection  An infection in your lungs can create more mucus and make it harder to breathe  An infection can also create swelling in your airway and prevent air from getting in  You can decrease your risk for infection by doing the following:         ? Wash your hands often with soap and water  Carry germ-killing gel with you  You can use the gel to clean your hands when soap and water are not available  ? Do not touch your eyes, nose, or mouth unless you have washed your hands first     ? Always cover your mouth when you cough  Cough into a tissue or your shirtsleeve so you do not spread germs from your hands      ? Try to avoid people who have a cold or the flu  If you are sick, stay away from others as much as possible  ? Ask about vaccines you may need  Influenza (the flu), pneumonia, and COVID-19 can become life-threatening for a person who has COPD  Get a yearly flu vaccine as soon as recommended, usually in September or October  The pneumonia vaccine may be given every 5 years, or as directed  COVID-19 vaccines are available in shots given in 1 or 2 doses  Your healthcare provider can tell you if you should also get other vaccines, and when to get them  · Drink liquids as directed  You may need to drink more liquid than usual  Liquid will help to keep your air passages moist and help you cough up mucus  Ask how much liquid to drink each day and which liquids are best for you  Follow up with your doctor as directed: You may need more tests  Your doctor may refer you to a specialist, depending on your needs  Some specialist services may be available through your pulmonary rehab program  Write down your questions so you remember to ask them during your visits  © Copyright [employer] 2022 Information is for End User's use only and may not be sold, redistributed or otherwise used for commercial purposes  All illustrations and images included in CareNotes® are the copyrighted property of A D A M , Inc  or [place] [employer]   The above information is an  only  It is not intended as medical advice for individual conditions or treatments  Talk to your doctor, nurse or pharmacist before following any medical regimen to see if it is safe and effective for you

## 2022-03-31 NOTE — PHYSICAL THERAPY NOTE
Physical Therapy Evaluation     Patient's Name: Farooq Miranda      Admitting Diagnosis  Shortness of breath [R06 02]  Macrocytosis without anemia [U39 66]  Chronic systolic congestive heart failure (HCC) [I50 22]  COPD with acute exacerbation (HCC) [J44 1]  Acute respiratory failure with hypoxia and hypercapnia (HCC) [J96 01, J96 02]    Problem List  Patient Active Problem List   Diagnosis    Coronary artery disease    [facility] dilated [facility] )    Chronic obstructive pulmonary disease (HCC)    Chronic systolic congestive heart failure (HCC)    Left bundle-branch block    KEVIN and COPD overlap syndrome (HCC)    Hypercholesterolemia    Gastroesophageal reflux disease    Impaired fasting glucose    Osteoarthritis    Osteoporosis    Vitamin D deficiency    Mood disorder (HCC)    Other insomnia    Macrocytosis without anemia    Weight loss    Chronic hypoxemic respiratory failure (HCC)    BPH with obstruction/lower urinary tract symptoms    Prostate cancer ([facility] )    Abnormal CT of the chest    Acute exacerbation of chronic obstructive pulmonary disease (COPD) ([facility] )    Acute on chronic respiratory failure with hypoxia and hypercapnia (HCC)    Pulmonary nodules/lesions, multiple    Former tobacco use    Acute metabolic encephalopathy       Past Medical History  Past Medical History:   Diagnosis Date    Arthritis     Bladder mass     Cardiomyopathy ([address] )     Chest pain     COPD (chronic obstructive pulmonary disease) (HCC)     CPAP (continuous positive airway pressure) dependence     Emphysema of lung (HCC)     Hypoxia     nocturnal    Left bundle branch block     Multiple pulmonary nodules     last assessed: 10/12/16    Pneumonia     Sleep apnea     Sleep apnea, obstructive     Smoker        Past Surgical History  Past Surgical History:   Procedure Laterality Date    COLONOSCOPY      CYSTOSCOPY      CYSTOSCOPY  02/17/2021    [place] CYSTOURETHROSCOPY,BIOPSY N/A 4/13/2021 Procedure: CYSTOSCOPY WITH BIOPSIES;  Surgeon: Horace Madrigal MD;  [facility];  Service: Urology    [place] CYSTOURETHROSCOPY,FULGUR <0 5 CM LESN N/A 1/3/2020    Procedure: TRANSURETHRAL RESECTION OF BLADDER TUMOR (TURBT); Surgeon: Horace Madrigal MD;  [facility];  Service: Urology    W[place] INSTILL ANTICANCER AGENT IN BLADDER N/A 1/3/2020    Procedure: Kathrene Copper;  Surgeon: Horace Madrigal MD;  [facility];  Service: Urology    [place] TRANSURETHRAL ELEC-SURG PROSTATECTOM N/A 4/13/2021    Procedure: TRANSURETHRAL RESECTION OF PROSTATE (TURP) BLADDER BIOPSY, FULGURATION;  Surgeon: Horace Madrigal MD;  [facility];  Service: Urology            03/31/22 0905   PT Last Visit   PT Visit Date 03/31/22   Note Type   Note type Evaluation   Pain Assessment   Pain Assessment Tool 0-10   Pain Score No Pain   Restrictions/Precautions   Weight Bearing Precautions Per Order No   Other Precautions O2  (3 L O2)   Home Living   Type of Home House  (3 CARLO )   Home Layout One level; Able to live on main level with bedroom/bathroom   Bathroom Shower/Tub Tub/shower unit   Bathroom Toilet Standard   Home Equipment Walker;Cane  (portable O2; reports not using AD at baseline )   Additional Comments reports sleeping on couch in living room; reports driving PTA    Prior Function   Level of [place] Independent with ADLs and functional mobility   Lives With Spouse;Son;Daughter   Receives Help From Family   ADL Assistance Independent   IADLs Independent   Falls in the last 6 months 0   Vocational Retired   General   Family/Caregiver Present No   Cognition   Overall Cognitive Status WFL   Arousal/Participation Alert   Attention Within functional limits   Orientation Level Oriented X4   Memory Within functional limits   Following Commands Follows all commands and directions without difficulty   Comments pt pleasant and cooeprative t/o therapy   RLE Assessment   RLE Assessment WFL  (5/5 grossly)   LLE Assessment   LLE Assessment WFL  (5/5 grossly)   Light Touch   RLE Light Touch Grossly intact   LLE Light Touch Grossly intact   Bed Mobility   Additional Comments was sitting EOB upon arrival, returned to sitting EOB at end of therapy    Transfers   Sit to Stand 7  Independent   Stand to Sit 7  Independent   Additional Comments no AD used for transfers    Ambulation/Elevation   Gait pattern Foward flexed; Short stride  (no arm swing )   Gait Assistance 5  Supervision   Additional items   (O2 management )   Assistive Device None   Distance 200 ft    Stair Management Assistance   (pt declined stair trial, reports no quesitons/ concerns )   Balance   Static Sitting Normal   Dynamic Sitting Good   Static Standing Fair +   Dynamic Standing Fair +   Ambulatory Fair   Activity Tolerance   Activity Tolerance Patient tolerated treatment well   Medical Staff Made Aware co-eval with OT due to medical complexity    Nurse Made Aware RN cleared pt to participate in therapy session   Assessment   Prognosis Good   Assessment Pt seen for moderate complexity PT evaluation to assess functional status and discharge needs  Pt with active PT eval/treat orders as well as up with assistance orders  Pt is a 60 y/o male admitted on 3/27/2022 for SOB and acute exacerbation of COPD  Pt's active comorbidities include CAD, cardiomyopathy, CHF, LBBB, GERD, acute metabolic encephalopathy, and pulmonary nodules/lesions  Pt has a past medical history of Arthritis, Bladder mass, Cardiomyopathy ([address] ), Chest pain, COPD (chronic obstructive pulmonary disease) (HCC), CPAP (continuous positive airway pressure) dependence, Emphysema of lung ([address] ), Hypoxia, Left bundle branch block, Multiple pulmonary nodules, Pneumonia, Sleep apnea, Sleep apnea, obstructive, and Smoker  Pt resides with spouse, daughter, and son in [address] [place] [address] with 3 CARLO  PTA, pt was independent with ADLS/IADLS   Upon evaluation, pt was able to perform transfers independently, and ambulation with supervision for assistance with O2 tank  Pt is performing near/at baseline mobility levels, and reports no questions or concerns regarding d/c  Pt was educated on energy conservation techniques for at home, which he verbalized understanding  Given the results of the evaluation, pt with no acute needs for PT at this time  PT to sign off and recommends home with no rehab needs  Please re-consult if needed  The patient's AM-PAC Basic Mobility Inpatient Short Form Raw Score is 23  A Raw score of greater than 16 suggests the patient may benefit from discharge to home  Please also refer to the recommendation of the Physical Therapist for safe discharge planning  Goals   Patient Goals to go home    Plan   Treatment/Interventions Endurance training;Patient/family training; Compensatory technique education;Gait training;Functional transfer training;Equipment eval/education;Spoke to nursing;OT   Recommendation   PT Discharge Recommendation No rehabilitation needs   AM-PAC Basic Mobility Inpatient   Turning in Bed Without Bedrails 4   Lying on Back to Sitting on Edge of Flat Bed 4   Moving Bed to Chair 4   Standing Up From Chair 4   Walk in Room 4   Climb 3-5 Stairs 3   Basic Mobility Inpatient Raw Score 23   Basic Mobility Standardized Score 50 88   Highest Level Of Mobility   JH-HLM Goal 7: Walk 25 feet or more   JH-HLM Highest Level of Mobility 8: Walk 250 feet ot more   JH-HLM Goal Achieved Yes   End of Consult   Patient Position at End of Consult Seated edge of bed; All needs within reach       [facility], [facility]

## 2022-03-31 NOTE — ASSESSMENT & PLAN NOTE
· Admitted for COPD exacerbation  Known severe COPD  Follows with Dr Bolivar Styles as an outpatient  · S/p IV methylprednisolone 40mg Q12H  Started PO prednisone 40mg daily  Discharged on 10 day prednisone taper  · Completed 3 day course of PO azithromcyin  · Continue nebs standing and PRN  · Palliative care was consulted for goals of care discussion, will plan to follow up with patient as an outpatient   · PT/OT consulted  · Overnight pulse oximetry test for bipap qualification with ABG in the AM completed 3/30-3/31   Bipap setup by case management

## 2022-03-31 NOTE — CASE MANAGEMENT
Case Management Assessment & Discharge Planning Note    Patient name Silvia Holding    Location 2 213/2 213-02 MRN [id number]  : 1955 Date 3/31/2022       Current Admission Date: 3/27/2022  Current Admission Diagnosis:Acute on chronic respiratory failure with hypoxia and hypercapnia [facility])   Patient Active Problem List    Diagnosis Date Noted    Acute metabolic encephalopathy     Acute exacerbation of chronic obstructive pulmonary disease (COPD) ([facility] ) 2022    Acute on chronic respiratory failure with hypoxia and hypercapnia ([facility] ) 2022    Pulmonary nodules/lesions, multiple 2022    Former tobacco use 2022    Abnormal CT of the chest 2022    Prostate cancer ([facility] ) 2021    BPH with obstruction/lower urinary tract symptoms 2021    Chronic hypoxemic respiratory failure ([facility] ) 2020    Weight loss 2019    Macrocytosis without anemia 2019    Other insomnia 2019    Gastroesophageal reflux disease 2017    Coronary artery disease 2016    Mood disorder ([facility] ) 2015    Impaired fasting glucose 2015    Osteoporosis 10/14/2014    Vitamin D deficiency 10/14/2014    Cardiomyopathy, dilated ([facility] ) 2014    Hypercholesterolemia 2014    Chronic systolic congestive heart failure ([facility] ) 2013    Left bundle-branch block 2013    Osteoarthritis 2013    KEVIN and COPD overlap syndrome ([facility] ) 2013    Chronic obstructive pulmonary disease ([facility] ) 2012      LOS (days): 4  Geometric Mean LOS (GMLOS) (days): 3 60  Days to GMLOS:-0 6     OBJECTIVE:    Risk of Unplanned Readmission Score: 13      Current admission status: Inpatient     Preferred Pharmacy:   [facility]/pharmacy [id number]  [facility] [address]  Phone: [phone] Fax: [phone]    Primary Care Provider: Anthonette Kanner, DO    Primary Insurance: BLUE CROSS  Secondary Insurance: MEDICARE    ASSESSMENT:  [address] [facility], [address] [facility] Representative - Spouse   Primary Phone: [phone] (Mobile)  Home Phone: [phone]                DISCHARGE DETAILS:    Discharge planning discussed with[de-identified] Patient at bedside     [facility]         Is the patient interested in [facility] Carson at discharge?: Yes  [address] requested[de-identified] Nursing (BiPap Management)  [facility] Name[de-identified] [facility] Provider[de-identified] PCP  Home Health Services Needed[de-identified] Heart Failure Management  Homebound Criteria Met[de-identified] Uses an Assist Device (i e  cane, walker, etc)  Supporting Clincal Findings[de-identified] Fatigues Easliy in Short Distances    DME Referral Provided  Referral made for DME?: Yes  DME referral completed for the following items[de-identified] BiPAP  DME Supplier Name[de-identified] [employer]    Other Referral/Resources/Interventions Provided:  Interventions: DME  Referral Comments: BiPap order placed, per provider's request  All information (overnight pulse ox, morning ABG) provided and used to place the order appropriately  [facility] accepted for BiPap management post-discharge -- Patient and spouse in agreement  BiPap currently pending delivery      Treatment Team Recommendation: Home  Discharge Destination Plan[de-identified] Home

## 2022-03-31 NOTE — OCCUPATIONAL THERAPY NOTE
Occupational Therapy Evaluation      Karsten Wright     3/31/2022    Principal Problem:    Acute on chronic respiratory failure with hypoxia and hypercapnia (HCC)  Active Problems:    Coronary artery disease    Cardiomyopathy, dilated (HCC)    Chronic obstructive pulmonary disease (HCC)    Chronic systolic congestive heart failure (HCC)    Left bundle-branch block    KEVIN and COPD overlap syndrome (HCC)    Hypercholesterolemia    Gastroesophageal reflux disease    Impaired fasting glucose    Chronic hypoxemic respiratory failure (HCC)    Acute exacerbation of chronic obstructive pulmonary disease (COPD) ([facility] )    Pulmonary nodules/lesions, multiple    Acute metabolic encephalopathy      Past Medical History:   Diagnosis Date    Arthritis     Bladder mass     Cardiomyopathy ([facility] )     Chest pain     [facility]     CPAP (continuous positive airway pressure) dependence     Emphysema of lung (HCC)     Hypoxia     nocturnal    Left bundle branch block     Multiple pulmonary nodules     last assessed: 10/12/16    Pneumonia     Sleep apnea     Sleep apnea, obstructive     Smoker        Past Surgical History:   Procedure Laterality Date    COLONOSCOPY      CYSTOSCOPY      CYSTOSCOPY  02/17/2021    NM CYSTOURETHROSCOPY,BIOPSY N/A 4/13/2021    Procedure: CYSTOSCOPY WITH BIOPSIES;  Surgeon: Aden Jean Baptiste MD;  [facility];  Service: Urology    NM CYSTOURETHROSCOPY,FULGUR <0 5 CM LESN N/A 1/3/2020    Procedure: TRANSURETHRAL RESECTION OF BLADDER TUMOR (TURBT);   Surgeon: Aden Jean Baptiste MD;  [facility];  Service: Urology    NM INSTILL ANTICANCER AGENT IN BLADDER N/A 1/3/2020    Procedure: INSTILLATION MITOMYCIN;  Surgeon: Aden Jean Baptiste MD;  [facility];  Service: Urology    NM TRANSURETHRAL ELEC-SURG PROSTATECTOM N/A 4/13/2021    Procedure: TRANSURETHRAL RESECTION OF PROSTATE (TURP) BLADDER BIOPSY, FULGURATION;  Surgeon: Jacklynn Kawasaki Guss Galeazzi, MD;  [facility];  Service: Urology        03/31/22 0906   OT Last Visit   OT Visit Date 03/31/22   Note Type   Note type Evaluation   Restrictions/Precautions   Weight Bearing Precautions Per Order No   Other Precautions Multiple lines;O2   Pain Assessment   Pain Assessment Tool 0-10   Pain Score No Pain   Home Living   Type of [address] One level  (3 CARLO)   Bathroom Shower/Tub Tub/shower unit   Additional Comments pt reports sleeping on the couch in the family room   Prior Function   Level of [place] Independent with ADLs and functional mobility   Lives With Spouse;Son;Daughter  (ages 15,12)   ADL Assistance Independent   IADLs Independent   Falls in the last 6 months 0   Comments pt reports increased difficulties with home management such as yardwork etc   Lifestyle   Autonomy + , fully independent w self care   Reciprocal Relationships supportive family   Intrinsic Gratification his labrador dog Marcela, jarocho whitehead   Psychosocial   Psychosocial (WDL) WDL   Subjective   Subjective "I find myself so SOB when I shower"   ADL   Eating Assistance 7  Independent   Grooming Assistance 7  Independent   UB Bathing Assistance 7  [address] 7  Independent   Additional Comments increased time needed due to SOB/SHARIF   Bed Mobility   Supine to Sit 7  Independent   Sit to Supine 7  Independent   Transfers   Sit to Stand 7  Independent   Stand to Sit 7  Independent   Stand pivot 7  Independent   Functional Mobility   Functional Mobility 5  Supervision   Additional Comments pt tries to rush things slightly   Balance   Static Sitting Normal   Dynamic Sitting Good   Static Standing Fair +   Dynamic Standing Fair +   Activity Tolerance   Activity Tolerance Patient tolerated treatment well  (minimally SOB w mild activity   Sats 96-97% on 3L O2) Medical Staff Made Aware seen as co-eval with PT due to medical complexity   Nurse Made Aware OK to see   RUE Assessment   RUE Assessment WFL   LUE Assessment   LUE Assessment WFL   Hand Function   Gross Motor Coordination Functional   Fine Motor Coordination Functional   Sensation   Light Touch No apparent deficits   Vision - Complex Assessment   Tracking Able to track stimulus in all quads without difficulty   Acuity Able to read clock/calendar on wall without difficulty   Additional Comments no visual difficulties reported   Cognition   Overall Cognitive Status [facility]   Arousal/Participation Alert; Cooperative   Attention Within functional limits   Orientation Level Oriented X4   Memory Within functional limits   Following Commands Follows all commands and directions without difficulty   Comments pleasant and cooperative   Assessment   Limitation Decreased endurance;Decreased high-level ADLs   Assessment Pt is a 61 y o  male seen for OT evaluation s/p admit to [facility] on 3/27/2022 w/ Acute on chronic respiratory failure with hypoxia and hypercapnia ([facility] [address] )  Pt with acute metabolic encephalopathy on admission, now resolved  Comorbidities affecting pt's functional performance at time of assessment include: COPD and CAD, pulmonary nodules, mood disorder, prostate CA, OA, osteoporosis, GERD, on home O2 at 3 L NC, cardiomyopathy  Personal factors affecting pt at time of IE include:steps to enter environment and difficulty performing IADLS   Prior to admission, pt was living at home in a one story home with his wife and 2 teenage children  Pt reports being independent w self care, mobility, driving etc, admits to increased difficulty due to SOB/SHARIF  Upon evaluation: Pt demonstrates ability to complete self care on his own from sitting at EOB, functional mobility in his room, no loss of balance noted  Pt  Was educated on ECT/self pacing skills with good understanding   Educated on pursed lip breathing, "smell the flowers, blow the candle" breathing as well as positional changes (raising arms, leaning onto table top etc in order to expand chest)  Pt also educated on use of DME such as shower seat at home  Pt very receptive to education provided  Based on findings from OT evaluation and functional performance deficits, pt has been identified as a moderate complexity evaluation  The patient's raw score on the AM-PAC Daily Activity inpatient short form is 24, standardized score is 57 54, greater than 39 4  Patients at this level are likely to benefit from discharge to home  From OT standpoint, recommendation at time of d/c would be home w family support  No ongoing OT needs identified, dc OT at this time  Goals   Patient Goals to go home   Plan   OT Frequency Eval only   Recommendation   OT Discharge Recommendation No rehabilitation needs   Equipment Recommended Shower/Tub stool (no back) ($)   OT - OK to Discharge Yes   Additional Comments  discussed recommendation of a shower chair   pt very receptive and said he'd look into it on his own   AM-PAC Daily Activity Inpatient   Lower Body Dressing 4   Bathing 4   Toileting 4   Upper Body Dressing 4   Grooming 4   Eating 4   Daily Activity Raw Score 24   Daily Activity Standardized Score (Calc for Raw Score >=11) 57 54

## 2022-03-31 NOTE — ASSESSMENT & PLAN NOTE
· Supposed to be on CPAP QHS but does not have machine at home due to recall  · Currently on Bipap QHS  · Overnight pulse ox study and AM ABG completed on 3/30/22  · Home Bipap QHS setup prior to discharge

## 2022-03-31 NOTE — DISCHARGE SUMMARY
[address] [facility]  Discharge- Michelle Mak  1955, 61 y o  male MRN: [id number]  Unit/Bed#: [address] 213-02 Encounter: [id number]  Primary Care Provider: Jonel Galloway DO   Date and time admitted to hospital: 3/27/2022  7:14 AM    * Acute on chronic respiratory failure with hypoxia and hypercapnia ([place] [address] )  Assessment & Plan  · Presented with worsening SOB and lethargy for one week prior to admission  Did not have improvement at home with nebs, steroids, azithromycin  · At baseline, on 3-4L NC  · On admission, required bipap  Admitted to critical care  · COVID/flu/RSV negative  · Started on nebs, IV steroids, ceftriaxone and azithromycin  · Now off standing bipap, still using QHS  · Currently on 3L NC    Acute exacerbation of chronic obstructive pulmonary disease (COPD) ([place] [address] )  Assessment & Plan  · Admitted for COPD exacerbation  Known severe COPD  Follows with Dr Magda León as an outpatient  · S/p IV methylprednisolone 40mg Q12H  Started PO prednisone 40mg daily  Discharged on 10 day prednisone taper  · Completed 3 day course of PO azithromcyin  · Continue nebs standing and PRN  · Palliative care was consulted for goals of care discussion, will plan to follow up with patient as an outpatient   · PT/OT consulted  · Overnight pulse oximetry test for bipap qualification with ABG in the AM completed 3/30-3/31  Bipap setup by case management    Acute metabolic encephalopathy  Assessment & Plan  · Lethargy 2/2 hypercapnia, present on admission  · Now resolved  Mental status at baseline  Pulmonary nodules/lesions, multiple  Assessment & Plan  · On most recent lung cancer screening CT he had a 7 mm patchy left lower lobe density, unlikely malignant  · Planned scan in 12 months, as outpatient      KEVIN and COPD overlap syndrome ([place] [address] )  Assessment & Plan  · Supposed to be on CPAP QHS but does not have machine at home due to recall  · Currently on Bipap QHS  · Overnight pulse ox study and AM ABG completed on 3/30/22  · Home Bipap QHS setup prior to discharge    Chronic systolic congestive heart failure [facility])  Assessment & Plan  Wt Readings from Last 3 Encounters:   03/31/22 58 5 kg (129 lb 1 oz)   01/13/22 56 kg (123 lb 6 4 oz)   12/16/21 58 5 kg (129 lb)       · EF 45-50%  · Secondary to dilated cardiomyopathy and LBBB  · Continue home Metoprolol 25 mg daily  · Not in acute exacerbation    Left bundle-branch block  Assessment & Plan  · Outpatient cardiology follow up    Hypercholesterolemia  Assessment & Plan  · Continue statin    Gastroesophageal reflux disease  Assessment & Plan  · Continue PPI    Impaired fasting glucose  Assessment & Plan  · Not on medications  · Continue to monitor, especially while on steroids    Chronic hypoxemic respiratory failure [facility])  Assessment & Plan  · On 3-4L NC at baseline    Medical Problems             Resolved Problems  Date Reviewed: 3/31/2022    None              Discharging Physician / Practitioner: Nalini Teresa MD  PCP: Yasmani Alicia DO  Admission Date:   Admission Orders (From admission, onward)     Ordered        03/27/22 1021  Inpatient Admission  Once                      Discharge Date: 03/31/22    Consultations During Hospital Stay:  · Critical care  · Palliative care    Procedures Performed:   · None     Significant Findings / Test Results:   · COPD exacerbation  · Pulmonary nodules    Incidental Findings:   · None     Test Results Pending at Discharge (will require follow up):    · None     Outpatient Tests Requested:  · Formal sleep study for bipap mask fitting    Complications:  None     Reason for Admission: shortness of breath    Hospital Course:   Navarro Tsang  is a 61 y o  male patient with PMHx chronic hypoxic respiratory failure (on 3-4L NC at baseline), severe COPD (FEV1 0 86, 27% predicted), known pulmonary nodules, HFmEF (EF 45-50%), KEVIN, HLD, GERD, BPH, who originally presented to the hospital on 3/27/2022 due to worsening SOB over 1 week  Despite use of home nebs, steroids, and azithromycin from his outpt pulmonologist, he continued to deteriorate  Had increased work of breathing and lethargy  On admission, his pOx was 75%; he required bipap  Was admitted to critical care  Blood work was obtained which revealed pH 7 24, pCO2 of 85 5, serum bicarbonate of 36, negative troponins, lactic acid 2 0, procalcitonin 0 05, WBC count 11 56, negative influenza A/B, negative RSV, negative SARS-CoV-2  He was afebrile, tachycardic to 114 on admission, tachypneic to 34, on admission normotensive, saturating near 100% on BiPAP  He was started on IV steroids and nebs  His respiratory status slowly improved and was able to be weaned off bipap  He returned to his baseline 3L NC  He continued to require bipap QHS and overnight pulse ox study done for home bipap approval  His was transitioned from IV to PO steroids and will continue a prolonged taper on discharge  He will need to follow up with his outpt pulmonologist Dr Jim Malone  Palliative care was consulted this admission to review goals of care given patient's severe COPD  Palliative care will continue to follow the patient as an outpatient  Please see above list of diagnoses and related plan for additional information  Condition at Discharge: fair    Discharge Day Visit / Exam:   Subjective:  States that he feels he is at his baseline and wants to go home  Vitals: Blood Pressure: 128/79 (03/31/22 0912)  Pulse: 94 (03/31/22 0912)  Temperature: 97 9 °F (36 6 °C) (03/31/22 0640)  Temp Source: Oral (03/30/22 0916)  Respirations: 19 (03/31/22 0640)  Height: 5' 2" (157 5 cm) (03/27/22 1138)  Weight - Scale: 58 5 kg (129 lb 1 oz) (03/31/22 0546)  SpO2: 95 % (03/31/22 1455)  Exam:   Physical Exam  Vitals and nursing note reviewed  Constitutional:       General: He is not in acute distress  Appearance: Normal appearance  Comments: Cachectic male  Nasal cannula in place      HENT: Head: Normocephalic  Mouth/Throat:      Mouth: Mucous membranes are moist    Eyes:      Extraocular Movements: Extraocular movements intact  Pupils: Pupils are equal, round, and reactive to light  Cardiovascular:      Rate and Rhythm: Normal rate and regular rhythm  Pulses: Normal pulses  Heart sounds: No murmur heard  Pulmonary:      Effort: Pulmonary effort is normal       Comments: Trace wheeze  Abdominal:      General: Bowel sounds are normal       Palpations: Abdomen is soft  Tenderness: There is no abdominal tenderness  Musculoskeletal:      Right lower leg: No edema  Left lower leg: No edema  Skin:     General: Skin is warm and dry  Neurological:      General: No focal deficit present  Mental Status: He is alert and oriented to person, place, and time  Mental status is at baseline  Psychiatric:         Mood and Affect: Mood normal          Behavior: Behavior normal           Discussion with Family: Updated  (wife) at bedside  Discharge instructions/Information to patient and family:   See after visit summary for information provided to patient and family  Provisions for Follow-Up Care:  See after visit summary for information related to follow-up care and any pertinent home health orders  Disposition:   Home with VNA Services (Reminder: Complete face to face encounter)    Planned Readmission: No     Discharge Statement:  I spent 50 minutes discharging the patient  This time was spent on the day of discharge  I had direct contact with the patient on the day of discharge  Greater than 50% of the total time was spent examining patient, answering all patient questions, arranging and discussing plan of care with patient as well as directly providing post-discharge instructions  Additional time then spent on discharge activities      Discharge Medications:  See after visit summary for reconciled discharge medications provided to patient and/or family        **Please Note: This note may have been constructed using a voice recognition system**

## 2022-04-01 ENCOUNTER — TELEPHONE (OUTPATIENT)
Dept: PALLIATIVE MEDICINE | Facility: CLINIC | Age: 67
End: 2022-04-01

## 2022-04-01 LAB
BACTERIA BLD CULT: NORMAL
BACTERIA BLD CULT: NORMAL

## 2022-04-01 NOTE — UTILIZATION REVIEW
Notification of Discharge   This is a Notification of Discharge from our facility [address]  Please be advised that this patient has been discharge from our facility  Below you will find the admission and discharge date and time including the patients disposition  UTILIZATION REVIEW CONTACT:  [facility]  Utilization   Network Utilization Review Department  Phone: [phone] x carefully listen to the prompts  All voicemails are confidential   Email: [email or web address]     PHYSICIAN ADVISORY SERVICES:  FOR [id number] REVIEW - MEDICAL NECESSITY DENIAL  Phone: [phone]  Fax: [phone]  Email: [email or web address]     PRESENTATION DATE: 3/27/2022  7:14 AM  OBERVATION ADMISSION DATE:   INPATIENT ADMISSION DATE: 3/27/22 10:21 AM   DISCHARGE DATE: 3/31/2022  6:54 PM  DIS[facility]ITION[facility] [address] INFORMATION:  Send all requests for admission clinical reviews, approved or denied determinations and any other requests to dedicated fax number below belonging to the campus where the patient is receiving treatment   List of dedicated fax numbers:  [address] DENIALS (Administrative/Medical Necessity) [phone]   [address] [place] [address] (Maternity/NICU/Pediatrics) [phone]   Ulysses Mossthal [phone]   [address] [facility] [phone]   [address] [place] [address] [phone]   [address] [facility] [address] [place] [address],[address]Th Floor [address] [phone]   [facility]  [phone]   [address] [facility], [facility]  [address] [facility] [address] [facility] [phone]

## 2022-04-01 NOTE — TELEPHONE ENCOUNTER
This patient is in our work que under the hospital follow up and  appt  Needed  I called left a message to call office to schedule appt's

## 2022-04-04 ENCOUNTER — TRANSITIONAL CARE MANAGEMENT (OUTPATIENT)
Dept: INTERNAL MEDICINE CLINIC | Facility: CLINIC | Age: 67
End: 2022-04-04

## 2022-04-06 ENCOUNTER — OFFICE VISIT (OUTPATIENT)
Dept: PULMONOLOGY | Facility: CLINIC | Age: 67
End: 2022-04-06
Payer: COMMERCIAL

## 2022-04-06 ENCOUNTER — TELEPHONE (OUTPATIENT)
Dept: PULMONOLOGY | Facility: CLINIC | Age: 67
End: 2022-04-06

## 2022-04-06 VITALS
WEIGHT: 119.6 LBS | HEART RATE: 78 BPM | OXYGEN SATURATION: 95 % | BODY MASS INDEX: 22.01 KG/M2 | DIASTOLIC BLOOD PRESSURE: 70 MMHG | TEMPERATURE: 98.3 F | SYSTOLIC BLOOD PRESSURE: 90 MMHG | HEIGHT: 62 IN

## 2022-04-06 DIAGNOSIS — J44.9 OSA AND COPD OVERLAP SYNDROME (HCC): Chronic | ICD-10-CM

## 2022-04-06 DIAGNOSIS — J96.11 CHRONIC HYPOXEMIC RESPIRATORY FAILURE (HCC): Chronic | ICD-10-CM

## 2022-04-06 DIAGNOSIS — J43.9 PULMONARY EMPHYSEMA, UNSPECIFIED EMPHYSEMA TYPE (HCC): Primary | Chronic | ICD-10-CM

## 2022-04-06 DIAGNOSIS — J44.9 COPD, VERY SEVERE (HCC): ICD-10-CM

## 2022-04-06 DIAGNOSIS — G47.33 OSA AND COPD OVERLAP SYNDROME (HCC): Chronic | ICD-10-CM

## 2022-04-06 PROBLEM — J96.21 ACUTE ON CHRONIC RESPIRATORY FAILURE WITH HYPOXIA AND HYPERCAPNIA (HCC): Status: RESOLVED | Noted: 2022-03-27 | Resolved: 2022-04-06

## 2022-04-06 PROBLEM — J44.1 ACUTE EXACERBATION OF CHRONIC OBSTRUCTIVE PULMONARY DISEASE (COPD) (HCC): Status: RESOLVED | Noted: 2022-03-27 | Resolved: 2022-04-06

## 2022-04-06 PROBLEM — J96.22 ACUTE ON CHRONIC RESPIRATORY FAILURE WITH HYPOXIA AND HYPERCAPNIA (HCC): Status: RESOLVED | Noted: 2022-03-27 | Resolved: 2022-04-06

## 2022-04-06 LAB
DME PARACHUTE DELIVERY DATE REQUESTED: NORMAL
DME PARACHUTE DELIVERY NOTE: NORMAL
DME PARACHUTE ITEM DESCRIPTION: NORMAL
DME PARACHUTE ORDER STATUS: NORMAL
DME PARACHUTE SUPPLIER NAME: NORMAL
DME PARACHUTE SUPPLIER PHONE: NORMAL

## 2022-04-06 PROCEDURE — 99214 OFFICE O/P EST MOD 30 MIN: CPT | Performed by: INTERNAL MEDICINE

## 2022-04-06 PROCEDURE — 1036F TOBACCO NON-USER: CPT | Performed by: INTERNAL MEDICINE

## 2022-04-06 RX ORDER — PREDNISONE 10 MG/1
20 TABLET ORAL DAILY
Qty: 60 TABLET | Refills: 5 | Status: SHIPPED | OUTPATIENT
Start: 2022-04-06

## 2022-04-06 RX ORDER — IPRATROPIUM BROMIDE AND ALBUTEROL SULFATE 2.5; .5 MG/3ML; MG/3ML
3 SOLUTION RESPIRATORY (INHALATION) 4 TIMES DAILY
Qty: 360 ML | Refills: 3 | Status: SHIPPED | OUTPATIENT
Start: 2022-04-06 | End: 2022-05-19 | Stop reason: SDUPTHER

## 2022-04-06 NOTE — ASSESSMENT & PLAN NOTE
He was changed from CPAP to BiPAP during most recent hospitalization  He went from a CPAP of 8 to a BiPAP of 12/5  He feels like he is not getting enough air  I placed a pressure change to 12/8 to see if this resolves  If it does not, will reach out for transition from BiPAP to trilogy noninvasive ventilator

## 2022-04-06 NOTE — ASSESSMENT & PLAN NOTE
His he has had pretty significant exacerbation last month, and prior was already quite symptomatic  He started with palliative care in the hospital, and I strongly recommended continued follow-up to him and his wife  His medication regimen should include prednisone 20 mg daily without further taper, Trelegy 1 puff once daily, DuoNeb nebulizer q 6 hours  We were able to provide him with some samples and coupons for Trelegy  Continue with visiting nurse  Will have ongoing conversations about pulmonary rehab    We discussed transplant, a [place] valve as next possible treatment options  In the past he did not want to undergo evaluation for transplant because he is concerned about procedures and was told in the past with his heart disease he may not be a candidate  When we discussed possibility of is a for valves, he did not want any further PFTs to be completed, therefore likely not a candidate      Will re-discuss at our next office visit

## 2022-04-06 NOTE — PATIENT INSTRUCTIONS
Your pulmonary medication should include:    Trelegy 1 puff once daily  DuoNeb nebulizer 4 times a day (every 6 hours) -   Stop Individual albuterol/ipratropium   prednisone 20 mg daily

## 2022-04-06 NOTE — PROGRESS NOTES
Assessment:    Chronic obstructive pulmonary disease ([facility] )   His he has had pretty significant exacerbation last month, and prior was already quite symptomatic  He started with palliative care in the hospital, and I strongly recommended continued follow-up to him and his wife  His medication regimen should include prednisone 20 mg daily without further taper, Trelegy 1 puff once daily, DuoNeb nebulizer q 6 hours  We were able to provide him with some samples and coupons for Trelegy  Continue with visiting nurse  Will have ongoing conversations about pulmonary rehab    We discussed transplant, a [place] valve as next possible treatment options  In the past he did not want to undergo evaluation for transplant because he is concerned about procedures and was told in the past with his heart disease he may not be a candidate  When we discussed possibility of is a for valves, he did not want any further PFTs to be completed, therefore likely not a candidate  Will re-discuss at our next office visit    KEVIN and COPD overlap syndrome ([facility] )    He was changed from CPAP to BiPAP during most recent hospitalization  He went from a CPAP of 8 to a BiPAP of 12/5  He feels like he is not getting enough air  I placed a pressure change to 12/8 to see if this resolves  If it does not, will reach out for transition from BiPAP to trilogy noninvasive ventilator  Chronic hypoxemic respiratory failure (HCC)   He will continue supplemental oxygen 3-4 L to keep sats greater than 88%      Plan:    Diagnoses and all orders for this visit:    Pulmonary emphysema, unspecified emphysema type ([facility] )    Very severe COPD with emphysema ([place] [address] )  -     fluticasone-umeclidinium-vilanterol ([place] Morning) 100-62 5-25 MCG/INH inhaler; Inhale 1 puff daily Rinse mouth after use  -     predniSONE 10 mg tablet; Take 2 tablets (20 mg total) by mouth daily  -     ipratropium-albuterol (DUO-NEB) 0 5-2 5 mg/3 mL nebulizer solution;  Take 3 mL by nebulization 4 (four) times a day  -     Nebulizer Supplies  -     Nebulizer    Chronic hypoxemic respiratory failure (HCC)    KEVIN and COPD overlap syndrome (HCC)  -     PAP DME Pressure Change  -     PAP DME Resupply/Reorder        Follow-up:  Next month      HPI:  He was admitted to the hospital last month for COPD exacerbation  He was on a prednisone taper - now down to 20mg daily   he continues with severe dyspnea on exertion  He has chest tightness and wheezing  He denies any significant cough or fever  He was unable to afford Trelegy  Inhaler  He is currently using nebulizer only  He is in the middle of his prednisone taper currently at 20 mg daily  He was told to go to 10 mg daily, but was at lowest 20 mg prior to hospitalization  He was discharged home on Bipap at night  He can't sleep with it on because he feels more suffocated  Remains on oxygen at 3-4L - goal to keep oxygen sats >88% (monitors at home with activity)    Review of Systems   Constitutional: Positive for activity change and fatigue  Negative for fever  Respiratory: Positive for cough, shortness of breath and wheezing  Cardiovascular: Negative for chest pain and leg swelling  Musculoskeletal: Negative for gait problem  Psychiatric/Behavioral: Positive for sleep disturbance         The following portions of the patient's history were reviewed and updated as appropriate: allergies, current medications, past family history, past medical history, past social history, past surgical history and problem list     VITALS:  Vitals:    04/06/22 1020 04/06/22 1024   BP: 90/70    BP Location: Left arm    Patient Position: Sitting    Cuff Size: Standard    Pulse: 78    Temp: 98 3 °F (36 8 °C)    SpO2:  95%   Weight: 54 3 kg (119 lb 9 6 oz)    Height: 5' 2" (1 575 m)        Physical Exam  General:  Patient is awake, alert,  Chronically ill-appearing with mild conversational dyspnea  Neck: No JVD  CV:  Regular, +S1 and S2, No murmurs, gallops or rubs appreciated  Lungs:   Greatly diminished breath sounds in all lung fields without wheeze, rales or rhonchi  Abdomen: Soft, +BS, Non-tender, non-distended  Extremities: No clubbing, cyanosis or edema  Neuro: No focal deficits        Diagnostic Testing:    CBC:  Lab Results   Component Value Date    WBC 7 57 03/30/2022    HGB 12 3 03/30/2022    HCT 38 4 03/30/2022     (H) 03/30/2022     03/30/2022         BMP:   Lab Results   Component Value Date     08/17/2015    K 4 2 03/30/2022     03/30/2022    CO2 35 (H) 03/30/2022    ANIONGAP 6 08/17/2015    BUN 23 03/30/2022    CREATININE 0 83 03/30/2022    GLUCOSE 90 08/17/2015    GLUF 88 08/19/2021    CALCIUM 8 9 03/30/2022    CORRECTEDCA 9 6 03/28/2022    AST 12 03/28/2022    ALT 21 03/28/2022    ALKPHOS 66 03/28/2022    PROT 6 8 08/17/2015    BILITOT 0 60 08/17/2015    EGFR 92 03/30/2022         Imaging studies:  I have personally reviewed pertinent films in PACS   March 2022-hyperinflation, no focal infiltrate      Stan Yang DO

## 2022-04-06 NOTE — TELEPHONE ENCOUNTER
I called all three numbers and patient did not answer either phones  I did leave a message on Jasmyn Montgomeryradha' cell number to give me a call back regarding the Trelegy

## 2022-04-07 ENCOUNTER — OFFICE VISIT (OUTPATIENT)
Dept: INTERNAL MEDICINE CLINIC | Facility: CLINIC | Age: 67
End: 2022-04-07
Payer: MEDICARE

## 2022-04-07 VITALS
WEIGHT: 122 LBS | TEMPERATURE: 97.4 F | HEART RATE: 72 BPM | SYSTOLIC BLOOD PRESSURE: 130 MMHG | DIASTOLIC BLOOD PRESSURE: 72 MMHG | BODY MASS INDEX: 22.45 KG/M2 | OXYGEN SATURATION: 98 % | HEIGHT: 62 IN

## 2022-04-07 DIAGNOSIS — N13.8 BPH WITH OBSTRUCTION/LOWER URINARY TRACT SYMPTOMS: Chronic | ICD-10-CM

## 2022-04-07 DIAGNOSIS — J96.11 CHRONIC HYPOXEMIC RESPIRATORY FAILURE (HCC): Chronic | ICD-10-CM

## 2022-04-07 DIAGNOSIS — J43.9 PULMONARY EMPHYSEMA, UNSPECIFIED EMPHYSEMA TYPE (HCC): Primary | Chronic | ICD-10-CM

## 2022-04-07 DIAGNOSIS — G89.29 CHRONIC MIDLINE LOW BACK PAIN WITHOUT SCIATICA: ICD-10-CM

## 2022-04-07 DIAGNOSIS — M54.50 CHRONIC MIDLINE LOW BACK PAIN WITHOUT SCIATICA: ICD-10-CM

## 2022-04-07 DIAGNOSIS — N40.1 BPH WITH OBSTRUCTION/LOWER URINARY TRACT SYMPTOMS: Chronic | ICD-10-CM

## 2022-04-07 DIAGNOSIS — C61 PROSTATE CANCER (HCC): Chronic | ICD-10-CM

## 2022-04-07 PROBLEM — R63.4 WEIGHT LOSS: Status: RESOLVED | Noted: 2019-08-29 | Resolved: 2022-04-07

## 2022-04-07 PROCEDURE — 99495 TRANSJ CARE MGMT MOD F2F 14D: CPT | Performed by: INTERNAL MEDICINE

## 2022-04-07 PROCEDURE — 3008F BODY MASS INDEX DOCD: CPT | Performed by: INTERNAL MEDICINE

## 2022-04-07 NOTE — ASSESSMENT & PLAN NOTE
-severe COPD on chronic oxygen    -had recent exacerbation requiring hospitalization    Now on BIPAP qhs with plan to change to full face mask for better tolerance   -baseline prednisone increased from 10mg to 20mg daily  -on Trelegy and nebs  -initiated Palliative Care  -follow up with Pulm

## 2022-04-08 NOTE — TELEPHONE ENCOUNTER
Patient LVM asking to speak with Malgorzata Daley regarding [place] and his inhaler  Please advise   [phone]

## 2022-04-08 NOTE — TELEPHONE ENCOUNTER
Spoke with Carmela Archibald, she stated that she just needed confirmation to see if it was ok for her to activate the Trelegy discount card  I explained to her yes, and if there is an issue that it doesn't go through, the pharmacy will let them know  She is calling me back on Monday to see if they were able to use the Trelegy coupon instead of having to pay $300

## 2022-04-11 PROBLEM — G93.41 ACUTE METABOLIC ENCEPHALOPATHY: Status: RESOLVED | Noted: 2022-03-29 | Resolved: 2022-04-11

## 2022-04-12 ENCOUNTER — TELEPHONE (OUTPATIENT)
Dept: PALLIATIVE MEDICINE | Facility: HOSPITAL | Age: 67
End: 2022-04-12

## 2022-04-12 NOTE — TELEPHONE ENCOUNTER
As per Dr Patel Tong request, MSW called pts wife, Eric Lerner, to offer support  MSW received her VM  I left a detailed message and requested a return call  Our office number was provided

## 2022-04-14 NOTE — TELEPHONE ENCOUNTER
Aury Renteria called to let [facility] and Dr Felisa Haile know that everything is ok right now and they appreciate all you do    Also if needed they will call

## 2022-05-19 ENCOUNTER — OFFICE VISIT (OUTPATIENT)
Dept: PULMONOLOGY | Facility: CLINIC | Age: 67
End: 2022-05-19
Payer: COMMERCIAL

## 2022-05-19 VITALS
DIASTOLIC BLOOD PRESSURE: 50 MMHG | BODY MASS INDEX: 21.75 KG/M2 | WEIGHT: 118.2 LBS | OXYGEN SATURATION: 98 % | HEIGHT: 62 IN | SYSTOLIC BLOOD PRESSURE: 100 MMHG | HEART RATE: 78 BPM | TEMPERATURE: 97 F

## 2022-05-19 DIAGNOSIS — J44.9 OSA AND COPD OVERLAP SYNDROME (HCC): Chronic | ICD-10-CM

## 2022-05-19 DIAGNOSIS — K21.9 GASTROESOPHAGEAL REFLUX DISEASE WITHOUT ESOPHAGITIS: ICD-10-CM

## 2022-05-19 DIAGNOSIS — G47.33 OSA AND COPD OVERLAP SYNDROME (HCC): Chronic | ICD-10-CM

## 2022-05-19 DIAGNOSIS — R91.8 PULMONARY NODULES/LESIONS, MULTIPLE: ICD-10-CM

## 2022-05-19 DIAGNOSIS — J96.11 CHRONIC HYPOXEMIC RESPIRATORY FAILURE (HCC): Chronic | ICD-10-CM

## 2022-05-19 DIAGNOSIS — J44.9 COPD, VERY SEVERE (HCC): ICD-10-CM

## 2022-05-19 DIAGNOSIS — J43.9 PULMONARY EMPHYSEMA, UNSPECIFIED EMPHYSEMA TYPE (HCC): Primary | Chronic | ICD-10-CM

## 2022-05-19 PROCEDURE — 1036F TOBACCO NON-USER: CPT | Performed by: INTERNAL MEDICINE

## 2022-05-19 PROCEDURE — 3008F BODY MASS INDEX DOCD: CPT | Performed by: INTERNAL MEDICINE

## 2022-05-19 PROCEDURE — 99215 OFFICE O/P EST HI 40 MIN: CPT | Performed by: INTERNAL MEDICINE

## 2022-05-19 PROCEDURE — 94640 AIRWAY INHALATION TREATMENT: CPT

## 2022-05-19 RX ORDER — FLUTICASONE FUROATE, UMECLIDINIUM BROMIDE AND VILANTEROL TRIFENATATE 200; 62.5; 25 UG/1; UG/1; UG/1
1 POWDER RESPIRATORY (INHALATION) DAILY
Qty: 60 BLISTER | Refills: 11 | Status: SHIPPED | OUTPATIENT
Start: 2022-05-19 | End: 2022-05-19 | Stop reason: SDUPTHER

## 2022-05-19 RX ORDER — IPRATROPIUM BROMIDE AND ALBUTEROL SULFATE 2.5; .5 MG/3ML; MG/3ML
3 SOLUTION RESPIRATORY (INHALATION)
Status: DISCONTINUED | OUTPATIENT
Start: 2022-05-19 | End: 2022-05-19

## 2022-05-19 RX ORDER — FLUTICASONE FUROATE, UMECLIDINIUM BROMIDE AND VILANTEROL TRIFENATATE 200; 62.5; 25 UG/1; UG/1; UG/1
1 POWDER RESPIRATORY (INHALATION) DAILY
Qty: 60 BLISTER | Refills: 11 | Status: SHIPPED | OUTPATIENT
Start: 2022-05-19 | End: 2023-05-14

## 2022-05-19 RX ORDER — ALBUTEROL SULFATE 2.5 MG/3ML
2.5 SOLUTION RESPIRATORY (INHALATION) ONCE
Status: COMPLETED | OUTPATIENT
Start: 2022-05-19 | End: 2022-05-19

## 2022-05-19 RX ORDER — IPRATROPIUM BROMIDE AND ALBUTEROL SULFATE 2.5; .5 MG/3ML; MG/3ML
3 SOLUTION RESPIRATORY (INHALATION) 4 TIMES DAILY
Qty: 360 ML | Refills: 3 | Status: SHIPPED | OUTPATIENT
Start: 2022-05-19 | End: 2022-08-09 | Stop reason: SDUPTHER

## 2022-05-19 RX ORDER — OMEPRAZOLE 20 MG/1
CAPSULE, DELAYED RELEASE ORAL
Qty: 90 CAPSULE | Refills: 0 | Status: SHIPPED | OUTPATIENT
Start: 2022-05-19 | End: 2022-07-02

## 2022-05-19 RX ADMIN — ALBUTEROL SULFATE 2.5 MG: 2.5 SOLUTION RESPIRATORY (INHALATION) at 13:22

## 2022-05-19 NOTE — PROGRESS NOTES
Assessment:    Chronic hypoxemic respiratory failure (HCC)  The patient will remain on oxygen 3 L nasal cannula to maintain sats greater than 88%    Chronic obstructive pulmonary disease ([facility] )  Overall the patient remains with significant pulmonary symptoms despite maximal therapy  I recommended an evaluation by transplant or at least endobronchial valve therapy  At this point he is not interested, they will talk about it and get back to me if he changes his mind  He is currently out of nebulizer solution because he used more than 4 day over the past several weeks  He is concerned he will not be able to make it to the end of May without any nebulizer treatments  I reordered/prescribed additional nebulizer treatments  We will give him an albuterol treatment now to help until he is able to get more for at home  He will remain on Trelegy 1 puff once daily, prednisone 20 mg daily  We discussed that I agree that palliative care would be beneficial,however he can't afford the large copay  KEVIN and COPD overlap syndrome ([place] [address] )  Plan to continue with nocturnal BiPAP  Currently 12/8 seems to be most appropriate settings  Plan:    Diagnoses and all orders for this visit:    Pulmonary emphysema, unspecified emphysema type ([place] [address] )  -     Discontinue: fluticasone-umeclidinium-vilanterol (Trelegy Ellipta) 200-62 5-25 MCG/INH AEPB inhaler; Inhale 1 puff  in the morning  Rinse mouth after use     -     Discontinue: ipratropium-albuterol (DUO-NEB) 0 5-2 5 mg/3 mL inhalation solution 3 mL  -     fluticasone-umeclidinium-vilanterol (Trelegy Ellipta) 200-62 5-25 MCG/INH AEPB inhaler; Inhale 1 puff  in the morning  Rinse mouth after use       KEVIN and COPD overlap syndrome (HCC)  -     albuterol inhalation solution 2 5 mg    Chronic hypoxemic respiratory failure (HCC)    Pulmonary nodules/lesions, multiple    Very severe COPD with emphysema ([place] [address] )  -     ipratropium-albuterol (DUO-NEB) 0 5-2 5 mg/3 mL nebulizer solution;  Take 3 mL by nebulization in the morning and 3 mL at noon and 3 mL in the evening and 3 mL before bedtime  Follow-up:  4 months      HPI:  Increased cough at night - even with Bipap  Feels like new Bipap pressure is better  He sleeps sitting up in order to be able to breathe  Still struggling with some mask issues - he has leaks with movement    Still on oxygen at 3L  Taking Trelegy once daily, Prednisone 20mg daily  Unfortunately he is currently out of nebulizer treatments because he was requiring them more than 4 times a day  He states that even though he space them out every 6 hours, in between he still needs something to help his breathing  He is frustrated at the fact that he can not do anything active without having significant pulmonary symptoms  He feels like he has never completely gone back to baseline after his recent COPD exacerbation  His Trelegy prescription increased in cost, he has a large bill from the hospital and post hospitalization care  He worries about them being able to afford medications  His wife states that he got a lot of talking with Dr Bethany Ryan (palliative care, but is unable to have ongoing copays  Review of Systems   Constitutional: Positive for activity change and fatigue  HENT: Positive for congestion and voice change  Respiratory: Positive for cough, shortness of breath and wheezing  Cardiovascular: Negative for chest pain and leg swelling  Gastrointestinal: Negative for nausea  Musculoskeletal: Negative for gait problem  Skin: Negative for rash  Psychiatric/Behavioral: Positive for sleep disturbance  Angry about current condition   All other systems reviewed and are negative        The following portions of the patient's history were reviewed and updated as appropriate: allergies, current medications, past family history, past medical history, past social history, past surgical history and problem list     Javi Daughters:  Vitals:    05/19/22 6169 05/19/22 0927   BP: 100/50    BP Location: Right arm    Patient Position: Sitting    Cuff Size: Standard    Pulse: 78    Temp: (!) 97 °F (36 1 °C)    TempSrc: Tympanic    SpO2: 98% 98%   Weight: 53 6 kg (118 lb 3 2 oz)    Height: 5' 2" (1 575 m)        Physical Exam  General:  Patient is awake, alert, non-toxic and in no acute respiratory distress  Neck: No JVD  CV:  Regular, +S1 and S2, No murmurs, gallops or rubs appreciated  Lungs: Greatly diminished breath sounds in all lung fields - no wheeze  Abdomen: Soft, +BS, Non-tender, non-distended  Extremities: No clubbing, cyanosis or edema  Neuro: No focal deficits        Diagnostic Testing:    CBC:  Lab Results   Component Value Date    WBC 7 57 03/30/2022    HGB 12 3 03/30/2022    HCT 38 4 03/30/2022     (H) 03/30/2022     03/30/2022         BMP:   Lab Results   Component Value Date     08/17/2015    K 4 2 03/30/2022     03/30/2022    CO2 35 (H) 03/30/2022    ANIONGAP 6 08/17/2015    BUN 23 03/30/2022    CREATININE 0 83 03/30/2022    GLUCOSE 90 08/17/2015    GLUF 88 08/19/2021    CALCIUM 8 9 03/30/2022    CORRECTEDCA 9 6 03/28/2022    AST 12 03/28/2022    ALT 21 03/28/2022    ALKPHOS 66 03/28/2022    PROT 6 8 08/17/2015    BILITOT 0 60 08/17/2015    EGFR 92 03/30/2022     Yoselin Meza DO

## 2022-05-19 NOTE — ASSESSMENT & PLAN NOTE
Overall the patient remains with significant pulmonary symptoms despite maximal therapy  I recommended an evaluation by transplant or at least endobronchial valve therapy  At this point he is not interested, they will talk about it and get back to me if he changes his mind  He is currently out of nebulizer solution because he used more than 4 day over the past several weeks  He is concerned he will not be able to make it to the end of May without any nebulizer treatments  I reordered/prescribed additional nebulizer treatments  We will give him an albuterol treatment now to help until he is able to get more for at home  He will remain on Trelegy 1 puff once daily, prednisone 20 mg daily  We discussed that I agree that palliative care would be beneficial,however he can't afford the large copay

## 2022-05-28 DIAGNOSIS — J44.9 CHRONIC OBSTRUCTIVE PULMONARY DISEASE, UNSPECIFIED COPD TYPE (HCC): ICD-10-CM

## 2022-06-01 RX ORDER — ALBUTEROL SULFATE 90 UG/1
AEROSOL, METERED RESPIRATORY (INHALATION)
Qty: 8.5 G | Refills: 11 | Status: SHIPPED | OUTPATIENT
Start: 2022-06-01

## 2022-06-03 ENCOUNTER — TELEPHONE (OUTPATIENT)
Dept: PULMONOLOGY | Facility: CLINIC | Age: 67
End: 2022-06-03

## 2022-06-03 NOTE — TELEPHONE ENCOUNTER
Pt's wife, Irena Liner re: Tam[facility]alma Ward was supposed to call her about [facility] and setting up a CT scan for him    Please advise Karrie John): [phone]

## 2022-06-03 NOTE — TELEPHONE ENCOUNTER
I reached out to Dr Luis Fernando Walker because I do not see a order for CT in the pt' chart  I sent Dr Luis Fernando Walker a tiger text message and she said she is a work event now and will contact the pt next week   I called the pt and left him a VM informing him

## 2022-06-06 DIAGNOSIS — R91.8 PULMONARY NODULES/LESIONS, MULTIPLE: Primary | ICD-10-CM

## 2022-06-13 ENCOUNTER — HOSPITAL ENCOUNTER (OUTPATIENT)
Dept: CT IMAGING | Facility: HOSPITAL | Age: 67
Discharge: HOME | End: 2022-06-13
Attending: INTERNAL MEDICINE
Payer: COMMERCIAL

## 2022-06-13 DIAGNOSIS — R91.8 PULMONARY NODULES/LESIONS, MULTIPLE: ICD-10-CM

## 2022-06-13 PROCEDURE — G1004 CDSM NDSC: HCPCS

## 2022-06-13 PROCEDURE — 71250 CT THORAX DX C-: CPT

## 2022-06-17 DIAGNOSIS — J43.9 PULMONARY EMPHYSEMA, UNSPECIFIED EMPHYSEMA TYPE (HCC): Primary | ICD-10-CM

## 2022-06-17 DIAGNOSIS — R91.8 PULMONARY NODULES/LESIONS, MULTIPLE: ICD-10-CM

## 2022-06-17 RX ORDER — GUAIFENESIN 600 MG
1200 TABLET, EXTENDED RELEASE 12 HR ORAL EVERY 12 HOURS SCHEDULED
Qty: 120 TABLET | Refills: 3 | Status: SHIPPED | OUTPATIENT
Start: 2022-06-17

## 2022-06-17 NOTE — PROGRESS NOTES
I called and spoke to the patient about the results of his CT scan  We discussed that over a 1 year time  He had some increased size of an area of clusters/cystic lesions  While this may be malignancy, it is still too small for biopsy or other testing  It would not be appropriate for PET scanning as there is no nodular component  I told him that we just need to do close follow-up  As it was a year between scans previously, I think 3 months is too soon for imaging and therefore we agreed at a 6 month CT scan  He has also had a significant amount of increased mucus which is causing him to cough and have increased respiratory symptoms  I asked him to start taking Mucinex 1200 mg twice daily to see if that will help

## 2022-07-02 DIAGNOSIS — M81.6 LOCALIZED OSTEOPOROSIS WITHOUT CURRENT PATHOLOGICAL FRACTURE: ICD-10-CM

## 2022-07-02 DIAGNOSIS — E78.2 MIXED HYPERLIPIDEMIA: ICD-10-CM

## 2022-07-02 DIAGNOSIS — E55.9 VITAMIN D DEFICIENCY: ICD-10-CM

## 2022-07-02 DIAGNOSIS — K21.9 GASTROESOPHAGEAL REFLUX DISEASE WITHOUT ESOPHAGITIS: ICD-10-CM

## 2022-07-02 RX ORDER — OMEPRAZOLE 20 MG/1
CAPSULE, DELAYED RELEASE ORAL
Qty: 90 CAPSULE | Refills: 0 | Status: ON HOLD | OUTPATIENT
Start: 2022-07-02 | End: 2022-08-26 | Stop reason: CLARIF

## 2022-07-02 RX ORDER — ROSUVASTATIN CALCIUM 10 MG/1
TABLET, COATED ORAL
Qty: 90 TABLET | Refills: 1 | Status: ON HOLD | OUTPATIENT
Start: 2022-07-02 | End: 2022-08-26 | Stop reason: CLARIF

## 2022-07-02 RX ORDER — ERGOCALCIFEROL 1.25 MG/1
50000 CAPSULE ORAL
Qty: 3 CAPSULE | Refills: 1 | Status: SHIPPED | OUTPATIENT
Start: 2022-07-02

## 2022-07-13 ENCOUNTER — TELEPHONE (OUTPATIENT)
Dept: PULMONOLOGY | Facility: CLINIC | Age: 67
End: 2022-07-13

## 2022-07-13 NOTE — TELEPHONE ENCOUNTER
Patient's wife calling stating a biopsy was supposed to be set up but she hasn't heard anything   Please advise

## 2022-07-13 NOTE — TELEPHONE ENCOUNTER
Spoke with patients wife and informed a biopsy at the moment will not be performed  Per Dr Cynthia Bowen last conversation a repeat ct scan will be done in 6 months  Provided her with central scheduling number to schedule for December

## 2022-07-14 ENCOUNTER — TELEPHONE (OUTPATIENT)
Dept: PULMONOLOGY | Facility: CLINIC | Age: 67
End: 2022-07-14

## 2022-07-14 NOTE — TELEPHONE ENCOUNTER
LM for patient to give a call back to set up a follow up Appt with Dr Rita Patel in [facility] or [place]

## 2022-08-05 ENCOUNTER — RA CDI HCC (OUTPATIENT)
Dept: OTHER | Facility: HOSPITAL | Age: 67
End: 2022-08-05

## 2022-08-05 NOTE — PROGRESS NOTES
[facility]  coding opportunities       Chart reviewed, no opportunity found: CHART REVIEWED, NO OPPORTUNITY FOUND       No HTN    Patients Insurance     Medicare Insurance: [employer]

## 2022-08-09 ENCOUNTER — TELEPHONE (OUTPATIENT)
Dept: PULMONOLOGY | Facility: CLINIC | Age: 67
End: 2022-08-09

## 2022-08-09 DIAGNOSIS — J44.9 COPD, VERY SEVERE (HCC): ICD-10-CM

## 2022-08-09 RX ORDER — IPRATROPIUM BROMIDE AND ALBUTEROL SULFATE 2.5; .5 MG/3ML; MG/3ML
3 SOLUTION RESPIRATORY (INHALATION) 4 TIMES DAILY
Qty: 360 ML | Refills: 4 | Status: SHIPPED | OUTPATIENT
Start: 2022-08-09 | End: 2023-01-06

## 2022-08-26 ENCOUNTER — APPOINTMENT (INPATIENT)
Dept: NON INVASIVE DIAGNOSTICS | Facility: HOSPITAL | Age: 67
End: 2022-08-26
Payer: COMMERCIAL

## 2022-08-26 ENCOUNTER — OFFICE VISIT (OUTPATIENT)
Dept: PULMONOLOGY | Facility: CLINIC | Age: 67
End: 2022-08-26
Payer: MEDICARE

## 2022-08-26 ENCOUNTER — HOSPITAL ENCOUNTER (INPATIENT)
Facility: HOSPITAL | Age: 67
LOS: 3 days | Discharge: HOME | End: 2022-08-29
Attending: GENERAL PRACTICE | Admitting: INTERNAL MEDICINE
Payer: COMMERCIAL

## 2022-08-26 ENCOUNTER — APPOINTMENT (OUTPATIENT)
Dept: RADIOLOGY | Facility: HOSPITAL | Age: 67
End: 2022-08-26
Payer: COMMERCIAL

## 2022-08-26 VITALS
OXYGEN SATURATION: 90 % | SYSTOLIC BLOOD PRESSURE: 110 MMHG | DIASTOLIC BLOOD PRESSURE: 58 MMHG | RESPIRATION RATE: 20 BRPM | WEIGHT: 118 LBS | HEIGHT: 62 IN | BODY MASS INDEX: 21.71 KG/M2 | TEMPERATURE: 97.5 F | HEART RATE: 130 BPM

## 2022-08-26 DIAGNOSIS — J44.9 OSA AND COPD OVERLAP SYNDROME (HCC): Chronic | ICD-10-CM

## 2022-08-26 DIAGNOSIS — I42.0 CARDIOMYOPATHY, DILATED (HCC): Chronic | ICD-10-CM

## 2022-08-26 DIAGNOSIS — J44.9 COPD, VERY SEVERE (HCC): ICD-10-CM

## 2022-08-26 DIAGNOSIS — J96.11 CHRONIC HYPOXEMIC RESPIRATORY FAILURE (HCC): Chronic | ICD-10-CM

## 2022-08-26 DIAGNOSIS — R91.8 PULMONARY NODULES/LESIONS, MULTIPLE: ICD-10-CM

## 2022-08-26 DIAGNOSIS — G47.09 OTHER INSOMNIA: ICD-10-CM

## 2022-08-26 DIAGNOSIS — G47.33 OSA AND COPD OVERLAP SYNDROME (HCC): Chronic | ICD-10-CM

## 2022-08-26 DIAGNOSIS — J44.1 ACUTE EXACERBATION OF CHRONIC OBSTRUCTIVE PULMONARY DISEASE (COPD) (HCC): ICD-10-CM

## 2022-08-26 DIAGNOSIS — J43.9 PULMONARY EMPHYSEMA, UNSPECIFIED EMPHYSEMA TYPE (HCC): Primary | Chronic | ICD-10-CM

## 2022-08-26 DIAGNOSIS — J43.9 PULMONARY EMPHYSEMA, UNSPECIFIED EMPHYSEMA TYPE (HCC): Chronic | ICD-10-CM

## 2022-08-26 DIAGNOSIS — J44.1 ACUTE EXACERBATION OF CHRONIC OBSTRUCTIVE PULMONARY DISEASE (COPD) (HCC): Primary | ICD-10-CM

## 2022-08-26 DIAGNOSIS — I50.23 ACUTE ON CHRONIC SYSTOLIC (CONGESTIVE) HEART FAILURE (HCC): ICD-10-CM

## 2022-08-26 PROBLEM — R93.89 ABNORMAL CT OF THE CHEST: Status: RESOLVED | Noted: 2022-01-13 | Resolved: 2022-08-26

## 2022-08-26 LAB
4HR DELTA HS TROPONIN: 7 NG/L
ALBUMIN SERPL BCP-MCNC: 3.8 G/DL (ref 3.5–5)
ALP SERPL-CCNC: 94 U/L (ref 34–104)
ALT SERPL W P-5'-P-CCNC: 18 U/L (ref 7–52)
ANION GAP SERPL CALCULATED.3IONS-SCNC: 4 MMOL/L (ref 4–13)
AORTIC ROOT: 3.7 CM
APICAL FOUR CHAMBER EJECTION FRACTION: 35 %
ARTERIAL PATENCY WRIST A: YES
ASCENDING AORTA: 3.2 CM
AST SERPL W P-5'-P-CCNC: 14 U/L (ref 13–39)
AV LVOT PEAK GRADIENT: 2 MMHG
AV PEAK GRADIENT: 5 MMHG
BASE EXCESS BLDA CALC-SCNC: 7.2 MMOL/L
BASOPHILS # BLD MANUAL: 0 THOUSAND/UL (ref 0–0.1)
BASOPHILS NFR MAR MANUAL: 0 % (ref 0–1)
BILIRUB SERPL-MCNC: 0.64 MG/DL (ref 0.2–1)
BODY TEMPERATURE: 37 DEGREES FEHRENHEIT
BUN SERPL-MCNC: 12 MG/DL (ref 5–25)
CALCIUM SERPL-MCNC: 8.5 MG/DL (ref 8.4–10.2)
CARDIAC TROPONIN I PNL SERPL HS: 34 NG/L
CARDIAC TROPONIN I PNL SERPL HS: 41 NG/L
CHLORIDE SERPL-SCNC: 101 MMOL/L (ref 96–108)
CO2 SERPL-SCNC: 32 MMOL/L (ref 21–32)
CREAT SERPL-MCNC: 0.72 MG/DL (ref 0.6–1.3)
E WAVE DECELERATION TIME: 215 MS
EOSINOPHIL # BLD MANUAL: 0 THOUSAND/UL (ref 0–0.4)
EOSINOPHIL NFR BLD MANUAL: 0 % (ref 0–6)
ERYTHROCYTE [DISTWIDTH] IN BLOOD BY AUTOMATED COUNT: 12.6 % (ref 11.6–15.1)
FLUAV RNA RESP QL NAA+PROBE: NEGATIVE
FLUBV RNA RESP QL NAA+PROBE: NEGATIVE
FRACTIONAL SHORTENING: 18 (ref 28–44)
GFR SERPL CREATININE-BSD FRML MDRD: 97 ML/MIN/1.73SQ M
GLUCOSE SERPL-MCNC: 137 MG/DL (ref 65–140)
HCO3 BLDA-SCNC: 34 MMOL/L (ref 22–28)
HCT VFR BLD AUTO: 40.2 % (ref 36.5–49.3)
HGB BLD-MCNC: 12.5 G/DL (ref 12–17)
HYPERCHROMIA BLD QL SMEAR: PRESENT
INTERVENTRICULAR SEPTUM IN DIASTOLE (PARASTERNAL SHORT AXIS VIEW): 0.7 CM
INTERVENTRICULAR SEPTUM: 0.7 CM (ref 0.6–1.1)
LAAS-AP2: 14.4 CM2
LAAS-AP4: 14.8 CM2
LEFT ATRIUM SIZE: 3.3 CM
LEFT INTERNAL DIMENSION IN SYSTOLE: 4.5 CM (ref 2.1–4)
LEFT VENTRICLE DIASTOLIC VOLUME (MOD BIPLANE): 107 ML
LEFT VENTRICLE SYSTOLIC VOLUME (MOD BIPLANE): 65 ML
LEFT VENTRICULAR INTERNAL DIMENSION IN DIASTOLE: 5.5 CM (ref 3.5–6)
LEFT VENTRICULAR POSTERIOR WALL IN END DIASTOLE: 0.7 CM
LEFT VENTRICULAR STROKE VOLUME: 54 ML
LV EF: 40 %
LVSV (TEICH): 54 ML
LYMPHOCYTES # BLD AUTO: 0.09 THOUSAND/UL (ref 0.6–4.47)
LYMPHOCYTES # BLD AUTO: 1 % (ref 14–44)
MACROCYTES BLD QL AUTO: PRESENT
MCH RBC QN AUTO: 31.4 PG (ref 26.8–34.3)
MCHC RBC AUTO-ENTMCNC: 31.1 G/DL (ref 31.4–37.4)
MCV RBC AUTO: 101 FL (ref 82–98)
MONOCYTES # BLD AUTO: 0.26 THOUSAND/UL (ref 0–1.22)
MONOCYTES NFR BLD: 3 % (ref 4–12)
MV E'TISSUE VEL-SEP: 4 CM/S
MV PEAK A VEL: 0.86 M/S
MV PEAK E VEL: 72 CM/S
MV STENOSIS PRESSURE HALF TIME: 62 MS
MV VALVE AREA P 1/2 METHOD: 3.55
NASAL CANNULA: 3
NEUTROPHILS # BLD MANUAL: 8.21 THOUSAND/UL (ref 1.85–7.62)
NEUTS BAND NFR BLD MANUAL: 3 % (ref 0–8)
NEUTS SEG NFR BLD AUTO: 93 % (ref 43–75)
NT-PROBNP SERPL-MCNC: 1685 PG/ML
O2 CT BLDA-SCNC: 17.8 ML/DL (ref 16–23)
OXYHGB MFR BLDA: 92.9 % (ref 94–97)
PCO2 BLDA: 57.5 MM HG (ref 36–44)
PH BLDA: 7.39 [PH] (ref 7.35–7.45)
PLATELET # BLD AUTO: 234 THOUSANDS/UL (ref 149–390)
PLATELET BLD QL SMEAR: ADEQUATE
PMV BLD AUTO: 9.2 FL (ref 8.9–12.7)
PO2 BLDA: 85.1 MM HG (ref 75–129)
POTASSIUM SERPL-SCNC: 4.3 MMOL/L (ref 3.5–5.3)
PROCALCITONIN SERPL-MCNC: <0.05 NG/ML
PROT SERPL-MCNC: 5.9 G/DL (ref 6.4–8.4)
RA PRESSURE ESTIMATED: 8 MMHG
RBC # BLD AUTO: 3.98 MILLION/UL (ref 3.88–5.62)
RBC MORPH BLD: PRESENT
RIGHT ATRIUM AREA SYSTOLE A4C: 14.6 CM2
RIGHT VENTRICLE ID DIMENSION: 4.4 CM
RSV RNA RESP QL NAA+PROBE: NEGATIVE
RV PSP: 40 MMHG
SARS-COV-2 RNA RESP QL NAA+PROBE: NEGATIVE
SL CV LEFT ATRIUM LENGTH A2C: 4.9 CM
SL CV LV EF: 40
SL CV PED ECHO LEFT VENTRICLE DIASTOLIC VOLUME (MOD BIPLANE) 2D: 148 ML
SL CV PED ECHO LEFT VENTRICLE SYSTOLIC VOLUME (MOD BIPLANE) 2D: 95 ML
SODIUM SERPL-SCNC: 137 MMOL/L (ref 135–147)
SPECIMEN SOURCE: ABNORMAL
TR MAX PG: 32 MMHG
TR PEAK VELOCITY: 2.8 M/S
TRICUSPID VALVE PEAK REGURGITATION VELOCITY: 2.83 M/S
WBC # BLD AUTO: 8.55 THOUSAND/UL (ref 4.31–10.16)

## 2022-08-26 PROCEDURE — 94760 N-INVAS EAR/PLS OXIMETRY 1: CPT

## 2022-08-26 PROCEDURE — 94640 AIRWAY INHALATION TREATMENT: CPT

## 2022-08-26 PROCEDURE — 99223 1ST HOSP IP/OBS HIGH 75: CPT | Performed by: INTERNAL MEDICINE

## 2022-08-26 PROCEDURE — 93306 TTE W/DOPPLER COMPLETE: CPT | Performed by: INTERNAL MEDICINE

## 2022-08-26 PROCEDURE — 85007 BL SMEAR W/DIFF WBC COUNT: CPT | Performed by: INTERNAL MEDICINE

## 2022-08-26 PROCEDURE — 99222 1ST HOSP IP/OBS MODERATE 55: CPT | Performed by: INTERNAL MEDICINE

## 2022-08-26 PROCEDURE — 82805 BLOOD GASES W/O2 SATURATION: CPT | Performed by: INTERNAL MEDICINE

## 2022-08-26 PROCEDURE — 84484 ASSAY OF TROPONIN QUANT: CPT | Performed by: INTERNAL MEDICINE

## 2022-08-26 PROCEDURE — 94664 DEMO&/EVAL PT USE INHALER: CPT

## 2022-08-26 PROCEDURE — 94660 CPAP INITIATION&MGMT: CPT

## 2022-08-26 PROCEDURE — 99255 IP/OBS CONSLTJ NEW/EST HI 80: CPT | Performed by: INTERNAL MEDICINE

## 2022-08-26 PROCEDURE — 0241U HB NFCT DS VIR RESP RNA 4 TRGT: CPT | Performed by: INTERNAL MEDICINE

## 2022-08-26 PROCEDURE — 84145 PROCALCITONIN (PCT): CPT | Performed by: INTERNAL MEDICINE

## 2022-08-26 PROCEDURE — 99215 OFFICE O/P EST HI 40 MIN: CPT | Performed by: INTERNAL MEDICINE

## 2022-08-26 PROCEDURE — 83880 ASSAY OF NATRIURETIC PEPTIDE: CPT | Performed by: INTERNAL MEDICINE

## 2022-08-26 PROCEDURE — 71046 X-RAY EXAM CHEST 2 VIEWS: CPT

## 2022-08-26 PROCEDURE — 85027 COMPLETE CBC AUTOMATED: CPT | Performed by: INTERNAL MEDICINE

## 2022-08-26 PROCEDURE — 36600 WITHDRAWAL OF ARTERIAL BLOOD: CPT

## 2022-08-26 PROCEDURE — 93306 TTE W/DOPPLER COMPLETE: CPT

## 2022-08-26 PROCEDURE — 80053 COMPREHEN METABOLIC PANEL: CPT | Performed by: INTERNAL MEDICINE

## 2022-08-26 RX ORDER — ROSUVASTATIN CALCIUM 10 MG/1
10 TABLET, COATED ORAL DAILY
COMMUNITY
End: 2022-09-27 | Stop reason: SDUPTHER

## 2022-08-26 RX ORDER — IPRATROPIUM BROMIDE AND ALBUTEROL SULFATE 2.5; .5 MG/3ML; MG/3ML
3 SOLUTION RESPIRATORY (INHALATION) 4 TIMES DAILY
Status: DISCONTINUED | OUTPATIENT
Start: 2022-08-26 | End: 2022-08-26

## 2022-08-26 RX ORDER — METHYLPREDNISOLONE SODIUM SUCCINATE 40 MG/ML
40 INJECTION, POWDER, LYOPHILIZED, FOR SOLUTION INTRAMUSCULAR; INTRAVENOUS EVERY 12 HOURS SCHEDULED
Status: DISCONTINUED | OUTPATIENT
Start: 2022-08-26 | End: 2022-08-26

## 2022-08-26 RX ORDER — GUAIFENESIN 600 MG/1
1200 TABLET, EXTENDED RELEASE ORAL EVERY 12 HOURS SCHEDULED
Status: DISCONTINUED | OUTPATIENT
Start: 2022-08-26 | End: 2022-08-29 | Stop reason: HOSPADM

## 2022-08-26 RX ORDER — ALBUTEROL SULFATE 90 UG/1
2 AEROSOL, METERED RESPIRATORY (INHALATION) EVERY 4 HOURS PRN
Status: DISCONTINUED | OUTPATIENT
Start: 2022-08-26 | End: 2022-08-29 | Stop reason: HOSPADM

## 2022-08-26 RX ORDER — METHYLPREDNISOLONE SODIUM SUCCINATE 40 MG/ML
40 INJECTION, POWDER, LYOPHILIZED, FOR SOLUTION INTRAMUSCULAR; INTRAVENOUS EVERY 8 HOURS SCHEDULED
Status: DISCONTINUED | OUTPATIENT
Start: 2022-08-26 | End: 2022-08-28

## 2022-08-26 RX ORDER — AZITHROMYCIN 250 MG/1
250 TABLET, FILM COATED ORAL EVERY 24 HOURS
Status: DISCONTINUED | OUTPATIENT
Start: 2022-08-26 | End: 2022-08-29 | Stop reason: HOSPADM

## 2022-08-26 RX ORDER — LEVALBUTEROL 1.25 MG/.5ML
1.25 SOLUTION, CONCENTRATE RESPIRATORY (INHALATION)
Status: DISCONTINUED | OUTPATIENT
Start: 2022-08-26 | End: 2022-08-29 | Stop reason: HOSPADM

## 2022-08-26 RX ORDER — AZITHROMYCIN 250 MG/1
500 TABLET, FILM COATED ORAL EVERY 24 HOURS
COMMUNITY
End: 2022-08-29

## 2022-08-26 RX ORDER — ASPIRIN 81 MG/1
81 TABLET, CHEWABLE ORAL DAILY
Status: DISCONTINUED | OUTPATIENT
Start: 2022-08-26 | End: 2022-08-29 | Stop reason: HOSPADM

## 2022-08-26 RX ORDER — PRAVASTATIN SODIUM 80 MG/1
80 TABLET ORAL
Status: DISCONTINUED | OUTPATIENT
Start: 2022-08-26 | End: 2022-08-29 | Stop reason: HOSPADM

## 2022-08-26 RX ORDER — AZITHROMYCIN 250 MG/1
250 TABLET, FILM COATED ORAL EVERY 24 HOURS
Qty: 30 TABLET | Refills: 6 | Status: ON HOLD | OUTPATIENT
Start: 2022-08-26 | End: 2022-08-26 | Stop reason: CLARIF

## 2022-08-26 RX ORDER — PREDNISONE 20 MG/1
20 TABLET ORAL DAILY
Status: DISCONTINUED | OUTPATIENT
Start: 2022-08-26 | End: 2022-08-26

## 2022-08-26 RX ORDER — ENOXAPARIN SODIUM 100 MG/ML
40 INJECTION SUBCUTANEOUS DAILY
Status: DISCONTINUED | OUTPATIENT
Start: 2022-08-26 | End: 2022-08-29 | Stop reason: HOSPADM

## 2022-08-26 RX ORDER — FUROSEMIDE 10 MG/ML
40 INJECTION INTRAMUSCULAR; INTRAVENOUS 2 TIMES DAILY
Status: DISCONTINUED | OUTPATIENT
Start: 2022-08-26 | End: 2022-08-28

## 2022-08-26 RX ADMIN — GUAIFENESIN 1200 MG: 600 TABLET ORAL at 21:29

## 2022-08-26 RX ADMIN — ENOXAPARIN SODIUM 40 MG: 40 INJECTION SUBCUTANEOUS at 13:17

## 2022-08-26 RX ADMIN — ASPIRIN 81 MG CHEWABLE TABLET 81 MG: 81 TABLET CHEWABLE at 13:18

## 2022-08-26 RX ADMIN — IPRATROPIUM BROMIDE 0.5 MG: 0.5 SOLUTION RESPIRATORY (INHALATION) at 15:21

## 2022-08-26 RX ADMIN — IPRATROPIUM BROMIDE 0.5 MG: 0.5 SOLUTION RESPIRATORY (INHALATION) at 21:11

## 2022-08-26 RX ADMIN — FUROSEMIDE 40 MG: 10 INJECTION, SOLUTION INTRAMUSCULAR; INTRAVENOUS at 13:18

## 2022-08-26 RX ADMIN — GUAIFENESIN 1200 MG: 600 TABLET ORAL at 13:18

## 2022-08-26 RX ADMIN — FUROSEMIDE 40 MG: 10 INJECTION, SOLUTION INTRAMUSCULAR; INTRAVENOUS at 17:21

## 2022-08-26 RX ADMIN — METHYLPREDNISOLONE SODIUM SUCCINATE 40 MG: 40 INJECTION, POWDER, FOR SOLUTION INTRAMUSCULAR; INTRAVENOUS at 21:29

## 2022-08-26 RX ADMIN — AZITHROMYCIN MONOHYDRATE 250 MG: 250 TABLET ORAL at 13:18

## 2022-08-26 RX ADMIN — PRAVASTATIN SODIUM 80 MG: 80 TABLET ORAL at 17:20

## 2022-08-26 RX ADMIN — LEVALBUTEROL 1.25 MG: 1.25 SOLUTION, CONCENTRATE RESPIRATORY (INHALATION) at 21:11

## 2022-08-26 RX ADMIN — LEVALBUTEROL 1.25 MG: 1.25 SOLUTION, CONCENTRATE RESPIRATORY (INHALATION) at 15:24

## 2022-08-26 NOTE — PROGRESS NOTES
Assessment:    Pulmonary nodules/lesions, multiple  His CT scan has had waxing and waning pulmonary nodules, 1 cluster of nodules in the left lower lobe had showed some slight progression on repeat imaging  We had a long discussion about risks and benefits of recurrent imaging verses monitoring growth closely  We decided that a CT scan at 6 months was most appropriate  Scan has been ordered for December 2022  Chronic hypoxemic respiratory failure (HCC)  Baseline oxygen 3 L nasal cannula  Unfortunately today with simply walking from the waiting room into our office on his portable device, he desaturated to 75%  We now have him on continuous flow at 3 L at rest he is 90%  KEVIN and COPD overlap syndrome ([address] )  Continue nocturnal BiPAP at settings 12/8  Cardiomyopathy, dilated ([address] )  He has new bilateral lower extremity edema  I am not sure if this was related to pulmonary hypertension, or worsening heart failure  He has previously not had swelling  He needs to be admitted for acute workup and most likely IV Lasix  Chronic obstructive pulmonary disease ([address] )  Today he presents with respiratory distress, diffuse wheezing, increased mucous and bilateral lower extremity pitting edema  He appears to be having a COPD exacerbation - unsure of triggered by acute respiratory infection or acute heart failure  He needs to be admitted to the hospital being that he desaturated to 75% on his home 3 L oxygen  He needs echocardiogram, chest x-ray and workup for etiology of COPD exacerbation  He will need IV steroids, IV Lasix  Upon discharge would start Zithromax 250 mg daily to prevent recurrent exacerbations  Usual home regimen is Trelegy daily with DuoNeb q 6 hours    Based off current presentation and severity of illness, I discussed with PAC/Dr Nita Medina - direct admission to [facility]      Plan:    Diagnoses and all orders for this visit:    Pulmonary emphysema, unspecified emphysema type ([address] )  - azithromycin (ZITHROMAX) 250 mg tablet; Take 1 tablet (250 mg total) by mouth every 24 hours    Chronic hypoxemic respiratory failure (HCC)    KEVIN and COPD overlap syndrome (HCC)    Pulmonary nodules/lesions, multiple    Cardiomyopathy, dilated ([facility] )    Acute exacerbation of chronic obstructive pulmonary disease (COPD) ([facility] )  -     Transfer to other facility        Follow-up:  4-6 weeks after hospitalization      HPI:  73-year-old male with very severe COPD presents for routine follow-up  He uses 3 L oxygen around the clock  He monitors his oxygen level and nose goal is to maintain greater than 88% sats  He uses Trelegy 1 puff once daily, and prednisone 20 mg daily  He is feeling poorly  Increased mucous - thick and difficult expectorate  He has a really hard time breathing, made worse when he can't cough  Denies fever but has some chills  He has noticed increasing swelling of his legs - which is new  Review of Systems   Constitutional: Positive for activity change and chills  Negative for fever  HENT: Positive for congestion  Respiratory: Positive for cough, shortness of breath and wheezing  Cardiovascular: Positive for leg swelling  Musculoskeletal: Positive for back pain  Skin: Negative for rash  Psychiatric/Behavioral: Positive for sleep disturbance         The following portions of the patient's history were reviewed and updated as appropriate: allergies, current medications, past family history, past medical history, past social history, past surgical history and problem list     VITALS:  Vitals:    08/26/22 0841 08/26/22 0842   BP: 110/58    BP Location: Left arm    Patient Position: Sitting    Cuff Size: Adult    Pulse: (!) 130    Resp: 20    Temp: 97 5 °F (36 4 °C)    TempSrc: Tympanic    SpO2:  90%   Weight: 53 5 kg (118 lb)    Height: 5' 2" (1 575 m)        Physical Exam  General:  Patient is awake, in mild respiratory distress with tachypnea  Neck: No JVD  CV:  Regular, +S1 and S2, No murmurs, gallops or rubs appreciated  Lungs: Diminished breath sounds with diffuse expiratory wheezing  Abdomen: Soft, +BS, Non-tender, non-distended  Extremities: B/L LE pitting edema  Neuro: No focal deficits      Diagnostic Testing:    CBC:  Lab Results   Component Value Date    WBC 7 57 03/30/2022    HGB 12 3 03/30/2022    HCT 38 4 03/30/2022     (H) 03/30/2022     03/30/2022         BMP:   Lab Results   Component Value Date     08/17/2015    K 4 2 03/30/2022     03/30/2022    CO2 35 (H) 03/30/2022    ANIONGAP 6 08/17/2015    BUN 23 03/30/2022    CREATININE 0 83 03/30/2022    GLUCOSE 90 08/17/2015    GLUF 88 08/19/2021    CALCIUM 8 9 03/30/2022    CORRECTEDCA 9 6 03/28/2022    AST 12 03/28/2022    ALT 21 03/28/2022    ALKPHOS 66 03/28/2022    PROT 6 8 08/17/2015    BILITOT 0 60 08/17/2015    EGFR 92 03/30/2022         Imaging studies:  I have personally reviewed pertinent reports     and I have personally reviewed pertinent films in PACS  CT chest 06/13/2022:  Emphysema, patchy nodular infiltrates, compression deformities of T10, T7 and T8      Lawrence Bowers DO

## 2022-08-26 NOTE — ASSESSMENT & PLAN NOTE
His CT scan has had waxing and waning pulmonary nodules, 1 cluster of nodules in the left lower lobe had showed some slight progression on repeat imaging  We had a long discussion about risks and benefits of recurrent imaging verses monitoring growth closely  We decided that a CT scan at 6 months was most appropriate  Scan has been ordered for December 2022

## 2022-08-26 NOTE — ASSESSMENT & PLAN NOTE
Today he presents with respiratory distress, diffuse wheezing, increased mucous and bilateral lower extremity pitting edema  He appears to be having a COPD exacerbation - unsure of triggered by acute respiratory infection or acute heart failure  He needs to be admitted to the hospital being that he desaturated to 75% on his home 3 L oxygen  He needs echocardiogram, chest x-ray and workup for etiology of COPD exacerbation  He will need IV steroids, IV Lasix  Upon discharge would start Zithromax 250 mg daily to prevent recurrent exacerbations    Usual home regimen is Trelegy daily with DuoNeb q 6 hours

## 2022-08-26 NOTE — ASSESSMENT & PLAN NOTE
· POA, reported with hypoxia, wheezing, respiratory distress and increased mucus production  Currently on 3 L at rest with sats 90%  Decreased to 75% with exertion in the office  · Chest x-ray suggests emphysema with no evidence of pneumonia  continue IV steroids 40 mg t i d   · Per pulmonology office notes, he is on azithromycin 250mg daily chronically for pulmonary emphysema  Inpatient pulmonary support appreciated  · Procalcitonin negative, sputum culture and Gram stain pending, COVID/flu/RSV PCR negative  · Continue respiratory protocol, nebs q i d    · Was on Trelegy in the past but patient states no longer on it due to cost

## 2022-08-26 NOTE — CONSULTS
Pulmonary Consultation   Saad Staton  74 y o  male MRN: [id number]  Unit/Bed#: S -74 Encounter: [id number]      Reason for consultation:  Acute COPD exacerbation    Requesting physician: Levon Teresa MD    Impressions/Recommendations:  1  Acute on chronic obstructive pulmonary disease   - patient with significant wheezing and accessory muscle use on exam, reports increased cough and sputum production   - continue DuoNebs 3 times daily   - continue daily Zithromax   - continue Solu-Medrol 40 mg q 8 hours   - continue BiPAP at night with home settings 12/8   - currently on home O2 of 3 L nasal cannula   - ordered ABG, consider day time BiPAP based on results    2  Acute on chronic systolic heart failure   - patient was significant volume overload on exam, 3+ pitting edema FDC up tibia, JVD and hepatic jugular reflex present   - continue with Lasix 40 mg IV b i d    - echo from 2019 with an EF of 45-55% and unable to determine diastolic function   - 1/64 Echo with EF 40%, abnormal diastolic dysfunction, right ventricular systolic pressure 40 mmHg   - chest x-ray with vascular congestion on my read   - consulted cardiology    3  Volume overload   - patient denies any changes in diet   - likely secondary to acute on chronic systolic heart failure    History of Present Illness   HPI:  Saad Staton  is a 74 y o  male seen in consult for acute COPD exacerbation  Patient states he went to his pulmonologist's office today  He was noted to have increased edema in his legs which is new for him  He was also noted to desat while walking from the waiting room to the examination room  His pulmonologist sent him to the hospital to be admitted for COPD exacerbation and volume overload  He does endorse a cough with clear sputum, but feels as though there is a lot of phlegm stuck in his chest/throat  Denies fevers or chills  Reports he has never noticed swelling in his legs before, nor has his wife    Endorses orthopnea and paroxysmal nocturnal dyspnea  Patient states he does feel very wheezy and feels as though he is having a flare up  Patient states he does not smoke  States he quit smoking a couple years ago  Reports smoking for around 30 years 1-2 packs per day  States he wears 3 L nasal cannula at home  He uses DuoNebs 4 times daily as with albuterol inhaler as needed  He also faithfully wears his BiPAP every night with settings of 12/8  Review of systems:  12 point review of systems was completed and was otherwise negative except as listed in HPI  Historical Information   Past Medical History:   Diagnosis Date    Acute metabolic encephalopathy 3/34/0794    Arthritis     Bladder mass     Cardiomyopathy ([facility] [address] )     Chest pain     [facility]     CPAP (continuous positive airway pressure) dependence     Emphysema of lung ([facility])     Hypoxia     nocturnal    Left bundle branch block     Multiple pulmonary nodules     last assessed: 10/12/16    Pneumonia     Sleep apnea     Sleep apnea, obstructive     Smoker     Weight loss 8/29/2019     Past Surgical History:   Procedure Laterality Date    COLONOSCOPY      CYSTOSCOPY      CYSTOSCOPY  02/17/2021    DC CYSTOURETHROSCOPY,BIOPSY N/A 4/13/2021    Procedure: CYSTOSCOPY WITH BIOPSIES;  Surgeon: Elyse Gramajo MD;  [facility];  Service: Urology    DC CYSTOURETHROSCOPY,FULGUR <0 5 CM LESN N/A 1/3/2020    Procedure: TRANSURETHRAL RESECTION OF BLADDER TUMOR (TURBT);   Surgeon: Elyse Gramajo MD;  [facility];  Service: Urology    DC INSTILL ANTICANCER AGENT IN BLADDER N/A 1/3/2020    Procedure: [place];  Surgeon: Elyse Gramajo MD;  [facility];  Service: Urology    DC TRANSURETHRAL ELEC-SURG PROSTATECTOM N/A 4/13/2021    Procedure: TRANSURETHRAL RESECTION OF PROSTATE (TURP) BLADDER BIOPSY, FULGURATION;  Surgeon: Elyse Gramajo MD;  [facility];  Service: Urology     Family History   Problem Relation Age of Onset    Diabetes Mother        Occupational history:  Retired     Tobacco history:  60 pack year history    Meds/Allergies   Current Facility-Administered Medications   Medication Dose Route Frequency    albuterol (PROVENTIL HFA,VENTOLIN HFA) inhaler 2 puff  2 puff Inhalation Q4H PRN    aspirin chewable tablet 81 mg  81 mg Oral Daily    azithromycin (ZITHROMAX) tablet 250 mg  250 mg Oral Q24H    enoxaparin (LOVENOX) subcutaneous injection 40 mg  40 mg Subcutaneous Daily    furosemide (LASIX) injection 40 mg  40 mg Intravenous BID    guaiFENesin (MUCINEX) 12 hr tablet 1,200 mg  1,200 mg Oral Q12H [address]    ipratropium (ATROVENT) 0 02 % inhalation solution 0 5 mg  0 5 mg Nebulization TID    levalbuterol (XOPENEX) inhalation solution 1 25 mg  1 25 mg Nebulization TID    methylPREDNISolone sodium succinate (Solu-MEDROL) injection 40 mg  40 mg Intravenous Q8H [address]    pravastatin (PRAVACHOL) tablet 80 mg  80 mg Oral Daily With Dinner     Medications Prior to Admission   Medication    azithromycin (ZITHROMAX) 250 mg tablet    rosuvastatin (CRESTOR) 10 MG tablet    albuterol (PROVENTIL HFA,VENTOLIN HFA) 90 mcg/act inhaler    aspirin 81 mg chewable tablet    ergocalciferol (VITAMIN D2) 50,000 units    guaiFENesin (MUCINEX) 600 mg 12 hr tablet    ipratropium-albuterol (DUO-NEB) 0 5-2 5 mg/3 mL nebulizer solution    predniSONE 10 mg tablet     No Known Allergies    Vitals: Blood pressure 128/74, pulse (!) 108, temperature 97 9 °F (36 6 °C), resp  rate 18, height 5' 2" (1 575 m), weight 53 5 kg (118 lb), SpO2 95 %  , Body mass index is 21 58 kg/m²      No intake or output data in the 24 hours ending 08/26/22 1501    Physical exam:    General Appearance:    Alert, cooperative, positive conversational dyspnea positive accessory muscle use       Head/eyes:    Normocephalic, without obvious abnormality, atraumatic,         PERRL, extraocular muscles intact, no scleral icterus    Nose:   Nares normal, septum midline, mucosa normal, no drainage    or sinus tenderness   Throat:   Moist mucous membranes,    Neck:   Supple, trachea midline, no adenopathy; no carotid    bruit positive JVD   Lungs:     Rhonchi and wheezes throughout both lungs, accessory muscle use   Chest Wall:    No tenderness or deformity    Heart:    Regular rate and rhythm, S1 and S2 normal, no murmur, rub   or gallop   Abdomen:     Soft, non-tender, bowel sounds active all four quadrants,     no masses, no organomegaly   Extremities:   Extremities normal, atraumatic, no cyanosis 3+ edema   Skin:   Warm, dry, turgor normal, no rashes or lesions   Lymph nodes:   Cervical and supraclavicular nodes normal   Neurologic:   CNII-XII intact, normal strength, non-focal         Labs:   CBC:   Lab Results   Component Value Date    WBC 8 55 2022    HGB 12 5 2022    HCT 40 2 2022     (H) 2022     2022    MCH 31 4 2022    MCHC 31 1 (L) 2022    RDW 12 6 2022    MPV 9 2 2022   , CMP:   Lab Results   Component Value Date    SODIUM 137 2022    K 4 3 2022     2022    CO2 32 2022    BUN 12 2022    CREATININE 0 72 2022    CALCIUM 8 5 2022    AST 14 2022    ALT 18 2022    ALKPHOS 94 2022    EGFR 97 2022       Imaging and other studies: I have personally reviewed pertinent reports  Pulmonary function testin2020  FEV1/FVC 30%  FVC  2 0L 59% -- 2 43L 72%  FEV1 0 6L 22% -- 0 66L 25%  %  %  DLCO 45%    EKG, Pathology, and Other Studies: I have personally reviewed pertinent reports        Code Status: Level 1 - Full Code      Justin Hopper DO

## 2022-08-26 NOTE — ASSESSMENT & PLAN NOTE
Baseline oxygen 3 L nasal cannula  Unfortunately today with simply walking from the waiting room into our office on his portable device, he desaturated to 75%  We now have him on continuous flow at 3 L at rest he is 90%

## 2022-08-26 NOTE — ASSESSMENT & PLAN NOTE
Wt Readings from Last 3 Encounters:   08/26/22 53 5 kg (118 lb)   05/19/22 53 6 kg (118 lb 3 2 oz)   04/11/22 57 kg (125 lb 10 6 oz)     · POA, has history of chronic systolic CHF, not currently on diuretics  · Presented with progressively worsening shortness of breath, orthopnea, PND and bilateral lower extremity pitting edema  Denies chest pain  · Per patient was taken off metoprolol by his cardiologist in the past   History of LBBB  · Last echocardiogram on file was in December of 2019 which showed EF 45-55%  Diastolic function was unable to be evaluated    Will repeat echocardiogram this admission  · Start on Lasix 40 mg IV b i d , daily weights, I's and O's, 2 g salt diet  · Check EKG  · Follows outpatient with Dr Reece Castellon

## 2022-08-26 NOTE — H&P
[address]  1955, 74 y o  male MRN: [id number]  Unit/Bed#: S -59 Encounter: [id number]  Primary Care Provider: Lory Arnold DO   Date and time admitted to hospital: 8/26/2022 10:18 AM    * Acute on chronic respiratory failure with hypoxia [facility])  Assessment & Plan  · POA, sent in from his pulmonologist office after he presented respiratory distress diffuse wheezing increased mucus production extremity pitting edema  · Reported with desats to 75% with exertion while on his home rate of 3 L nasal O2  · Also admits to 3 months history progressively worsening shortness of breath with associated orthopnea and PND  · At the time of admission, he was on 3 L with sats 90%  Suspected multifactorial due to COPD and CHF exacerbation  · Admitted to Med surg, started on IV steroids for COPD exacerbation as well as IV Lasix for CHF exacerbation   · Respiratory protocol, nebs q i d , check chest x-ray Pulmonology consult  · Initial labs ordered, CBC with diff, CMP, BNP, TSH    Acute systolic congestive heart failure (HCC)  Assessment & Plan  Wt Readings from Last 3 Encounters:   08/26/22 53 5 kg (118 lb)   05/19/22 53 6 kg (118 lb 3 2 oz)   04/11/22 57 kg (125 lb 10 6 oz)     · POA, has history of chronic systolic CHF, not currently on diuretics  · Presented with progressively worsening shortness of breath, orthopnea, PND and bilateral lower extremity pitting edema  Denies chest pain  · Per patient was taken off metoprolol by his cardiologist in the past   History of LBBB  · Last echocardiogram on file was in December of 2019 which showed EF 45-55%  Diastolic function was unable to be evaluated    Will repeat echocardiogram this admission  · Start on Lasix 40 mg IV b i d , daily weights, I's and O's, 2 g salt diet  · Check EKG  · Follows outpatient with Dr Flor Mcdermott    Acute exacerbation of chronic obstructive pulmonary disease (COPD) ([facility] )  Assessment & Plan  · POA, reported with hypoxia, wheezing, respiratory distress and increased mucus production  · Currently on 3 L at rest with sats 90%  Decreased to 75% with exertion in the office  · Chest x-ray ordered  Started on IV steroids 40 mg t i d   · Per pulmonology office notes, he is on azithromycin 250mg daily chronically for pulmonary emphysema  · Check procalcitonin, sputum culture and Gram stain, COVID/flu/RSV PCR  · Continue respiratory protocol, nebs q i d  · Was on Trelegy in the past but patient states no longer on it due to cost    Pulmonary nodules/lesions, multiple  Assessment & Plan  · Follows outpatient with Dr Janay Azar  Her last CT of the chest was on 6/13/22  · Scheduled for repeat CT chest in 6 months      VTE Prophylaxis: Enoxaparin (Lovenox)  / sequential compression device     Code Status:  Full code    Anticipated Length of Stay:  Patient will be admitted on an Inpatient basis with an anticipated length of stay of  > 2 midnights  Justification for Hospital Stay:  Acute on chronic respiratory failure/CHF    Chief Complaint:     Worsening shortness of breath and lower extremity swelling    History of Present Illness:  Zahida Arthur  is a 74 y o  male who presents with progressively worsening shortness of breath over the past 3 months  Patient was seen at his pulmonologist office this morning and reported with significant desaturations to 75% on his baseline 3 L nasal cannula O2 after walking from the waiting room into the office  The patient reports associated orthopnea and PND  He denies any chest pain  No fever or chills  He also reports cough with increased mucus production which she has been having trouble bringing up  He was reported with new lower extremity edema  Patient's spouse reported weight loss of unknown amount    He denies any tobacco use although on evaluation patient did smell of tobacco    Review of Systems   Constitutional: Positive for activity change, chills and unexpected weight change  HENT: Positive for congestion  Respiratory: Positive for cough, shortness of breath and wheezing  Cardiovascular: Positive for leg swelling  Negative for chest pain and palpitations  Gastrointestinal: Negative  Genitourinary: Negative  Musculoskeletal: Negative  Neurological: Negative  Psychiatric/Behavioral: Negative  All other systems reviewed and are negative  Past Medical History:   Diagnosis Date    Acute metabolic encephalopathy 2/34/5914    Arthritis     Bladder mass     Cardiomyopathy ([address] )     Chest pain     [facility])     CPAP (continuous positive airway pressure) dependence     Emphysema of lung (HCC)     Hypoxia     nocturnal    Left bundle branch block     Multiple pulmonary nodules     last assessed: 10/12/16    Pneumonia     Sleep apnea     Sleep apnea, obstructive     Smoker     Weight loss 8/29/2019       Past Surgical History:   Procedure Laterality Date    COLONOSCOPY      CYSTOSCOPY      CYSTOSCOPY  02/17/2021    OH CYSTOURETHROSCOPY,BIOPSY N/A 4/13/2021    Procedure: CYSTOSCOPY WITH BIOPSIES;  Surgeon: Nadege Mercado MD;  [facility];  Service: Urology    OH CYSTOURETHROSCOPY,FULGUR <0 5 CM LESN N/A 1/3/2020    Procedure: TRANSURETHRAL RESECTION OF BLADDER TUMOR (TURBT);   Surgeon: Nadege Mercado MD;  [facility];  Service: Urology    OH INSTILL ANTICANCER AGENT IN BLADDER N/A 1/3/2020    Procedure: Daryle Moan;  Surgeon: Nadege Mercado MD;  [facility];  Service: Urology    OH TRANSURETHRAL ELEC-SURG PROSTATECTOM N/A 4/13/2021    Procedure: TRANSURETHRAL RESECTION OF PROSTATE (TURP) BLADDER BIOPSY, FULGURATION;  Surgeon: Nadege Mercado MD;  [facility];  Service: Urology       Family History:  Family History   Problem Relation Age of Onset    Diabetes Mother        Home Meds:  all medications and allergies reviewed    Allergies: No Known Allergies      Marital Status: /Civil Union     Social History     Substance and Sexual Activity   Alcohol Use Not Currently    Comment: rarely     Social History     Tobacco Use   Smoking Status Former Smoker    Packs/day: 2 50    Years: 37 00    Pack years: 92 50    Types: Cigarettes    Start date: 1970    Quit date: 2012    Years since quitting: 10 6   Smokeless Tobacco Former User   Tobacco Comment    1 ppd for 37 years, 2010 down to 5 cigs a day, is around second hand smoke     Social History     Substance and Sexual Activity   Drug Use No           Physical Exam:   Vitals:   Blood Pressure: 111/75 (08/26/22 1027)  Pulse: (!) 110 (08/26/22 1027)  Temperature: 98 2 °F (36 8 °C) (08/26/22 1027)  Respirations: 18 (08/26/22 1027)  SpO2: 96 % (08/26/22 1027)    Physical Exam  Constitutional:       General: He is not in acute distress  Appearance: He is ill-appearing  He is not diaphoretic  Comments: Chronically ill-appearing   HENT:      Head: Atraumatic  Mouth/Throat:      Mouth: Mucous membranes are moist    Eyes:      General: No scleral icterus  Right eye: No discharge  Left eye: No discharge  Cardiovascular:      Rate and Rhythm: Regular rhythm  Tachycardia present  Pulmonary:      Effort: Prolonged expiration and respiratory distress present  No tachypnea or accessory muscle usage  Breath sounds: Examination of the right-upper field reveals decreased breath sounds  Examination of the left-upper field reveals decreased breath sounds  Examination of the right-middle field reveals decreased breath sounds and wheezing  Examination of the left-middle field reveals decreased breath sounds and wheezing  Examination of the right-lower field reveals decreased breath sounds and wheezing  Examination of the left-lower field reveals decreased breath sounds and wheezing  Decreased breath sounds and wheezing present  No rales     Abdominal:      General: There is no distension  Palpations: Abdomen is soft  There is no mass  Tenderness: There is no abdominal tenderness  Musculoskeletal:      Cervical back: Neck supple  Right lower le+ Edema present  Left lower le+ Edema present  Neurological:      General: No focal deficit present  Mental Status: He is alert and oriented to person, place, and time  Mental status is at baseline  Lab Results: CBC, CMP, BNP, TSH pending    Imaging: Chest x-ray pending    EKG, Pathology, and Other Studies Reviewed on Admission:   · EKG pending    ** Please Note: Dragon 360 Dictation voice to text software may have been used in the creation of this document   **

## 2022-08-26 NOTE — ASSESSMENT & PLAN NOTE
· POA, sent in from his pulmonologist office after he presented respiratory distress diffuse wheezing increased mucus production extremity pitting edema  · Reported with desats to 75% with exertion while on his home rate of 3 L nasal O2  · Also admits to 3 months history progressively worsening shortness of breath with associated orthopnea and PND  · At the time of admission, he was on 3 L with sats 90%    Suspected multifactorial due to COPD and CHF exacerbation  · Admitted to Med surg, started on IV steroids for COPD exacerbation as well as IV Lasix for CHF exacerbation   · Respiratory protocol, nebs q i d , check chest x-ray Pulmonology consult  · Initial labs ordered, CBC with diff, CMP, BNP, TSH

## 2022-08-26 NOTE — ASSESSMENT & PLAN NOTE
He has new bilateral lower extremity edema  I am not sure if this was related to pulmonary hypertension, or worsening heart failure  He has previously not had swelling  He needs to be admitted for acute workup and most likely IV Lasix

## 2022-08-26 NOTE — ASSESSMENT & PLAN NOTE
· Follows outpatient with Dr Blanca Villa    Her last CT of the chest was on 6/13/22  · Scheduled for repeat CT chest in 6 months

## 2022-08-26 NOTE — CONSULTS
Consultation - Cardiology Team One  Gilda Dugan  74 y o  male MRN: [id number]  Unit/Bed#: S -26 Encounter: [id number]    Inpatient consult to Cardiology  Consult performed by: DELIA Ellis  Consult ordered by: Zander Aviles DO      Physician Requesting Consult: Delmy Mendez DO  Reason for Consult / Principal Problem:  Acute CHF exacerbation    Assessment:  1  Chronic hypoxic respiratory failure: In the setting of COPD exacerbation and volume overload  Maintained on 3 L NC chronically  2  Chronic HFrEF:  Presents with new lower extremity edema  CXR revealed emphysema with no acute finding  · EF 40% with RWMA and no significant valvular abnormality on echocardiogram today  EF was 45-50% on echocardiogram in 12/2019  Patient started on IV Lasix 40 mg BID  · Home diuretic regimen:  None  · Dry weight:  118 lb  · Weight on admission:  118 lb per standing scale  3  Acute COPD exacerbation:  IV steroids initiated with management per Pulmonary  4  Nonobstructive CAD  5  LBBB     Plan/Recommendations:  · Will check BNP  · Continue lasix 40 mg IV BID for leg edema  · Daily standing weights  · Strict I/Os  ______________________________________________________  CC:  Shortness of breath, lower extremity edema and increased sputum production    History of Present Illness   HPI: Gilda Dugan  is a 74y o  year old male who has chronic hypoxic respiratory failure on chronic 3L NC, chronic HFpEF, dilated cardiomyopathy EF 45-50%, nonobstructive CAD by cardiac catheterization, severe COPD  who follows with cardiologist Dr Ace Class  He was seen in the pulmonary office by Dr Dyana Venegas today and was feeling very poorly with increased mucus and shortness of breath  He also had new lower extremity edema    His found to be in respiratory distress with diffuse wheezing and pitting edema with concerns of COPD acute exacerbation and was referred for admission to [facility] for IV steroids and IV Lasix  On arrival to the hospital patient was tachycardic in the 1 teens with oxygen saturation 96%  CXR revealed emphysema with no acute abnormality  Echocardiogram revealed EF of 40% from previously 45-50% in 2019  Labs revealed stable CMP, HS troponin negative x1, negative procalcitonin, stable CBC  Respiratory panel was negative for influenza/RSV/COVID-19  Solu-Medrol for and IV Lasix 40 mg BID was initiated  Cardiology has been consulted for further evaluation and management of CHF  Upon examination, patient does not look well  He is sitting in a tripod position  Notes that he has been feeling short of breath on and off for "years " States that his pulmonologist today was concerned about his leg swelling and sent him to the ER for further evaluation  Notes that the leg swelling occurred over the last 1-2 days  He has never experienced leg swelling in the past  Patient complains of chronic orthopnea and has been sleeping upright for as long as he can remember  He wears 3L NC chronically and states that he has not had to increase his requirements in recent days at home  He does weigh himself at home  States a dry weight of 128-130lb, although he has been weighing in at 118 lb recently  He denies excessive salt or fluid intake  Denies any recent increase of mucous, although it is hard to clear his mucous at times  He denies any chest pain or pressure, dizziness, lightheadedness, or syncope  He denies any recent viral illness, fever, or diaphoresis  He does complain of chills  He follows with Dr Jossie Hopper outpatient  Home medication regimen includes aspirin 81 mg daily and rosuvastatin 10 mg daily  Echocardiogram 08/26/2022:  EF 40% with moderate global hypokinesis and regional variation, abnormal diastolic function, dilated RV with normal function, mild MR, mild TR  When compared to the echocardiogram from 12/2019 EF was 45-50%      EKG reviewed personally:  No EKG this admission    Telemetry reviewed personally: ST, HR 90s, no significant events    Review of Systems   Constitutional: Positive for chills  Negative for diaphoresis, fever, malaise/fatigue and night sweats  HENT: Negative  Eyes: Negative  Cardiovascular: Positive for dyspnea on exertion, leg swelling and orthopnea  Negative for chest pain, near-syncope, palpitations and syncope  Respiratory: Positive for shortness of breath, sputum production and wheezing  Negative for cough, hemoptysis and snoring  Endocrine: Negative  Hematologic/Lymphatic: Negative  Skin: Negative  Musculoskeletal: Negative  Gastrointestinal: Negative for bloating, abdominal pain and nausea  Genitourinary: Negative  Neurological: Negative  Psychiatric/Behavioral: Negative  Historical Information   Past Medical History:   Diagnosis Date    Acute metabolic encephalopathy 0/31/7079    Arthritis     Bladder mass     Cardiomyopathy ([facility] [address] )     Chest pain     [facility]     CPAP (continuous positive airway pressure) dependence     Emphysema of lung ([facility])     Hypoxia     nocturnal    Left bundle branch block     Multiple pulmonary nodules     last assessed: 10/12/16    Pneumonia     Sleep apnea     Sleep apnea, obstructive     Smoker     Weight loss 8/29/2019     Past Surgical History:   Procedure Laterality Date    COLONOSCOPY      CYSTOSCOPY      CYSTOSCOPY  02/17/2021    NM CYSTOURETHROSCOPY,BIOPSY N/A 4/13/2021    Procedure: CYSTOSCOPY WITH BIOPSIES;  Surgeon: Malinda Boston MD;  [facility];  Service: Urology    NM CYSTOURETHROSCOPY,FULGUR <0 5 CM LESN N/A 1/3/2020    Procedure: TRANSURETHRAL RESECTION OF BLADDER TUMOR (TURBT);   Surgeon: Malinda Boston MD;  [facility];  Service: Urology    NM INSTILL ANTICANCER AGENT IN BLADDER N/A 1/3/2020    Procedure: Christine Burt;  Surgeon: Malinda Boston MD;  [facility];  Service: Urology    DE TRANSURETHRAL ELEC-SURG PROSTATECTOM N/A 4/13/2021    Procedure: TRANSURETHRAL RESECTION OF PROSTATE (TURP) BLADDER BIOPSY, FULGURATION;  Surgeon: John Wilkerson MD;  [facility];  Service: Urology     Social History     Substance and Sexual Activity   Alcohol Use Not Currently    Comment: rarely     Social History     Substance and Sexual Activity   Drug Use No     Social History     Tobacco Use   Smoking Status Former Smoker    Packs/day: 2 50    Years: 37 00    Pack years: 92 50    Types: Cigarettes    Start date: 1970    Quit date: 2012    Years since quitting: 10 6   Smokeless Tobacco Former User   Tobacco Comment    1 ppd for 37 years, 2010 down to 5 cigs a day, is around second hand smoke     Family History:   Family History   Problem Relation Age of Onset    Diabetes Mother        Meds/Allergies   all current active meds have been reviewed, current meds:   Current Facility-Administered Medications   Medication Dose Route Frequency    albuterol (PROVENTIL HFA,VENTOLIN HFA) inhaler 2 puff  2 puff Inhalation Q4H PRN    aspirin chewable tablet 81 mg  81 mg Oral Daily    azithromycin (ZITHROMAX) tablet 250 mg  250 mg Oral Q24H    enoxaparin (LOVENOX) subcutaneous injection 40 mg  40 mg Subcutaneous Daily    furosemide (LASIX) injection 40 mg  40 mg Intravenous BID    guaiFENesin (MUCINEX) 12 hr tablet 1,200 mg  1,200 mg Oral Q12H GABE    ipratropium (ATROVENT) 0 02 % inhalation solution 0 5 mg  0 5 mg Nebulization TID    levalbuterol (XOPENEX) inhalation solution 1 25 mg  1 25 mg Nebulization TID    methylPREDNISolone sodium succinate (Solu-MEDROL) injection 40 mg  40 mg Intravenous Q8H [address]    pravastatin (PRAVACHOL) tablet 80 mg  80 mg Oral Daily With Dinner    and PTA meds:   Prior to Admission Medications   Prescriptions Last Dose Informant Patient Reported? Taking?    albuterol (PROVENTIL HFA,VENTOLIN HFA) 90 mcg/act inhaler  Self No No   Sig: TAKE 2 PUFFS BY MOUTH EVERY 6 HOURS AS NEEDED FOR WHEEZE OR FOR SHORTNESS OF BREATH   aspirin 81 mg chewable tablet  Self No No   Sig: Chew 1 tablet (81 mg total) daily   azithromycin (ZITHROMAX) 250 mg tablet   Yes Yes   Sig: Take 500 mg by mouth every 24 hours COPD maintainance   ergocalciferol (VITAMIN D2) 50,000 units  Self No No   Sig: TAKE 1 CAPSULE (50,000 UNITS TOTAL) BY MOUTH EVERY 28 DAYS   guaiFENesin (MUCINEX) 600 mg 12 hr tablet  Self No No   Sig: Take 2 tablets (1,200 mg total) by mouth every 12 (twelve) hours   ipratropium-albuterol (DUO-NEB) 0 5-2 5 mg/3 mL nebulizer solution  Self No No   Sig: Take 3 mL by nebulization 4 (four) times a day   predniSONE 10 mg tablet  Self No No   Sig: Take 2 tablets (20 mg total) by mouth daily   rosuvastatin (CRESTOR) 10 MG tablet   Yes Yes   Sig: Take 10 mg by mouth daily      Facility-Administered Medications: None        No Known Allergies    Objective   Vitals: Blood pressure 128/74, pulse (!) 108, temperature 97 9 °F (36 6 °C), resp  rate 18, height 5' 2" (1 575 m), weight 53 5 kg (118 lb), SpO2 95 %  ,     Body mass index is 21 58 kg/m²  ,     Systolic (27AYH), IYU:196 , Min:110 , TWA:321     Diastolic (79RDX), POF:58, Min:58, Max:75    Wt Readings from Last 3 Encounters:   08/26/22 53 5 kg (118 lb)   08/26/22 53 5 kg (118 lb)   05/19/22 53 6 kg (118 lb 3 2 oz)      Lab Results   Component Value Date    CREATININE 0 72 08/26/2022    CREATININE 0 83 03/30/2022    CREATININE 0 84 03/28/2022     No intake or output data in the 24 hours ending 08/26/22 1541  Weight (last 2 days)     Date/Time Weight    08/26/22 1315 53 5 (118)    08/26/22 1059 53 5 (118)        Invasive Devices  Report    Peripheral Intravenous Line  Duration           Peripheral IV 08/26/22 Right Antecubital <1 day              Physical Exam  Constitutional:       General: He is in acute distress  Appearance: He is ill-appearing  He is not diaphoretic        Comments: Frail   HENT:      Head: Normocephalic and atraumatic  Nose: Nose normal       Mouth/Throat:      Mouth: Mucous membranes are moist       Pharynx: Oropharynx is clear  Eyes:      Pupils: Pupils are equal, round, and reactive to light  Neck:      Comments: No JVD  Cardiovascular:      Rate and Rhythm: Regular rhythm  Tachycardia present  Pulses: Normal pulses  Heart sounds: Normal heart sounds  Pulmonary:      Breath sounds: Wheezing present  Comments: Tachypneic, on baseline 3L NC  Abdominal:      General: Bowel sounds are normal       Palpations: Abdomen is soft  Musculoskeletal:         General: Normal range of motion  Cervical back: Normal range of motion  Right lower leg: Edema present  Left lower leg: Edema present  Comments: +2 B/L LE edema   Skin:     General: Skin is warm and dry  Capillary Refill: Capillary refill takes less than 2 seconds  Neurological:      General: No focal deficit present  Mental Status: He is alert and oriented to person, place, and time     Psychiatric:         Mood and Affect: Mood normal        LABORATORY RESULTS:      CBC with diff:   Results from last 7 days   Lab Units 08/26/22  1230   WBC Thousand/uL 8 55   HEMOGLOBIN g/dL 12 5   HEMATOCRIT % 40 2   MCV fL 101*   PLATELETS Thousands/uL 234   MCH pg 31 4   MCHC g/dL 31 1*   RDW % 12 6   MPV fL 9 2       CMP:  Results from last 7 days   Lab Units 08/26/22  1230   POTASSIUM mmol/L 4 3   CHLORIDE mmol/L 101   CO2 mmol/L 32   BUN mg/dL 12   CREATININE mg/dL 0 72   CALCIUM mg/dL 8 5   AST U/L 14   ALT U/L 18   ALK PHOS U/L 94   EGFR ml/min/1 73sq m 97       BMP:  Results from last 7 days   Lab Units 08/26/22  1230   POTASSIUM mmol/L 4 3   CHLORIDE mmol/L 101   CO2 mmol/L 32   BUN mg/dL 12   CREATININE mg/dL 0 72   CALCIUM mg/dL 8 5          No results found for: NTBNP    Lipid Profile:   Lab Results   Component Value Date    CHOL 166 08/17/2015    CHOL 174 02/19/2015    CHOL 153 10/12/2014     Lab Results   Component Value Date    HDL 53 2021    HDL 55 2020    HDL 32 (L) 2019     Lab Results   Component Value Date    LDLCALC 75 2021    LDLCALC 64 2020    LDLCALC 75 2019     Lab Results   Component Value Date    TRIG 86 2021    TRIG 67 2020    TRIG 65 2019         Cardiac testing:   Results for orders placed during the hospital encounter of 19    Echo complete with contrast if indicated    Narrative  [address]  [address] [place] [address]  [phone]    Transthoracic Echocardiogram  2D, M-mode, Doppler, and Color Doppler    Study date:  23-Dec-2019    Patient: Ramon Mitchell  MR number: [id number]  Account number: [de-identified]  : 1955  Age: 60 years  Gender: Male  Status: Outpatient  Location: [address] [place] [address]  Height: 62 in  Weight: 132 7 lb  BP: 122/ 80 mmHg    Indications: Cardiomyopathy, Heart Failure    Diagnoses: I42 9 - Cardiomyopathy, unspecified, I50 9 - Heart failure, unspecified    Sonographer:  Jocelyn Sage RDCS  Primary Physician:  Nataliia Pickering DO  Referring Physician:  Ana Cristina Sarabia MD  Group:  [facility]  Interpreting Physician:  Justin Mckeon MD    SUMMARY    SUMMARY:  lv in 50% range; very limitedstudy  poor penetration  no significant vaive issues  lbbb with septal bounce    LEFT VENTRICLE:  There were no regional wall motion abnormalities  VENTRICULAR SEPTUM:  There was dyssynergic motion  These changes are consistent with a conduction abnormality or paced rhythm  HISTORY: PRIOR HISTORY: Hyperlipidemia, CAD, LBBB, Cardiomyopathy, CHF, Former Smoker, COPD, GERD    PROCEDURE: The study was performed in the [address]  This was a routine study  The transthoracic approach was used  The study included complete 2D imaging, M-mode, complete spectral Doppler, and color Doppler   The  heart rate was 76 bpm, at the start of the study  Images were obtained from the parasternal, apical, subcostal, and suprasternal notch acoustic windows  Echocardiographic views were limited due to lung interference  This was a technically  difficult study  LEFT VENTRICLE: Size was normal  Ejection fraction was estimated in the range of 45 % to 55 % to be 50 %  There were no regional wall motion abnormalities  DOPPLER: The study was not technically sufficient to allow evaluation of LV  diastolic function  VENTRICULAR SEPTUM: There was dyssynergic motion  These changes are consistent with a conduction abnormality or paced rhythm  RIGHT VENTRICLE: The size was normal  Systolic function was normal  Wall thickness was normal     LEFT ATRIUM: Size was normal     RIGHT ATRIUM: Size was normal     MITRAL VALVE: Not well visualized  TRICUSPID VALVE: Not well visualized  PULMONIC VALVE: Not well visualized  PERICARDIUM: There was no pericardial effusion  The pericardium was normal in appearance  AORTA: The root exhibited normal size  SYSTEMIC VEINS: IVC: Not assessed      SYSTEM MEASUREMENT TABLES    2D  %FS: 21 44 %  Ao Diam: 3 01 cm  EDV(Teich): 114 61 ml  EF Biplane: 46 59 %  EF(Cube): 51 51 %  EF(Teich): 43 34 %  ESV(Cube): 58 22 ml  ESV(Teich): 64 94 ml  IVSd: 1 03 cm  LA Area: 15 57 cm2  LA Diam: 2 95 cm  LVEDV MOD A2C: 105 95 ml  LVEDV MOD A4C: 80 23 ml  LVEDV MOD BP: 92 89 ml  LVEF MOD A2C: 47 66 %  LVEF MOD A4C: 48 6 %  LVESV MOD A2C: 55 45 ml  LVESV MOD A4C: 41 24 ml  LVESV MOD BP: 49 61 ml  LVIDd: 4 93 cm  LVIDs: 3 88 cm  LVLd A2C: 7 46 cm  LVLd A4C: 7 59 cm  LVLs A2C: 6 12 cm  LVLs A4C: 5 57 cm  LVPWd: 0 94 cm  RA Area: 14 54 cm2  RV Diam : 3 71 cm  SI(Cube): 38 42 ml/m2  SI(Teich): 30 85 ml/m2  SV MOD A2C: 50 5 ml  SV MOD A4C: 38 99 ml  SV(Cube): 61 85 ml  SV(Teich): 49 67 ml    MM  TAPSE: 1 28 cm    PW  E': 0 06 m/s  E/E': 7 78  MV A Gaurang: 0 66 m/s  MV Dec Gordon: 1 75 m/s2  MV DecT: 280 57 ms  MV E Gaurang: 0 49 m/s  MV E/A Ratio: 0 75    [facility] Accredited Echocardiography Laboratory    Prepared and electronically signed by    Mjogan Chris MD  Signed 23-Dec-2019 13:40:12    No results found for this or any previous visit  No valid procedures specified  No results found for this or any previous visit  Imaging: I have personally reviewed pertinent reports  XR chest pa & lateral    Result Date: 8/26/2022  Narrative: CHEST INDICATION:   Shortness of breath  71-year-old male  Recent CT of the chest demonstrated emphysema and several small nodules  COMPARISON:  March 27, 2022  EXAM PERFORMED/VIEWS:  XR CHEST PA & LATERAL  The frontal view was performed utilizing dual energy radiographic technique  FINDINGS: Cardiomediastinal silhouette appears unremarkable  Lungs clear and hyperinflated  No evidence of pleural effusion or pneumothorax  Diffuse demineralization of the imaged portions of the skeleton  Compression deformities of multiple mid thoracic vertebrae with resultant accentuated kyphosis  Bones otherwise unremarkable  Impression: Emphysema  No evidence of acute abnormality in the chest  Workstation performed: [id number]     Echo complete    Result Date: 8/26/2022  Narrative: Roselia Marte  Left Ventricle: Left ventricular cavity size is normal  Wall thickness is mildly increased  The left ventricular ejection fraction is 40%  Systolic function is moderately reduced  There is moderate global hypokinesis with regional variation  Diastolic function is abnormal    Right Ventricle: Right ventricular cavity size is mildly dilated  Systolic function is normal    Mitral Valve: There is mild regurgitation    Tricuspid Valve: There is mild regurgitation  The estimated right ventricular systolic pressure is 23 44 mmHg  Counseling / Coordination of Care  Total floor / unit time spent today 45 minutes    Greater than 50% of total time was spent with the patient and / or family counseling and / or coordination of care  A description of the counseling / coordination of care: Review of history, current assessment, development of a plan  Code Status: Level 1 - Full Code    ** Please Note: Dragon 360 Dictation voice to text software may have been used in the creation of this document   **

## 2022-08-27 PROBLEM — E44.1 MILD PROTEIN-CALORIE MALNUTRITION (HCC): Status: ACTIVE | Noted: 2022-08-27

## 2022-08-27 LAB
ANION GAP SERPL CALCULATED.3IONS-SCNC: 9 MMOL/L (ref 4–13)
BUN SERPL-MCNC: 18 MG/DL (ref 5–25)
CALCIUM SERPL-MCNC: 8.9 MG/DL (ref 8.4–10.2)
CHLORIDE SERPL-SCNC: 95 MMOL/L (ref 96–108)
CO2 SERPL-SCNC: 35 MMOL/L (ref 21–32)
CREAT SERPL-MCNC: 0.87 MG/DL (ref 0.6–1.3)
GFR SERPL CREATININE-BSD FRML MDRD: 90 ML/MIN/1.73SQ M
GLUCOSE SERPL-MCNC: 157 MG/DL (ref 65–140)
POTASSIUM SERPL-SCNC: 4.5 MMOL/L (ref 3.5–5.3)
SODIUM SERPL-SCNC: 139 MMOL/L (ref 135–147)
TSH SERPL DL<=0.05 MIU/L-ACNC: 0.59 UIU/ML (ref 0.45–4.5)

## 2022-08-27 PROCEDURE — 99232 SBSQ HOSP IP/OBS MODERATE 35: CPT | Performed by: INTERNAL MEDICINE

## 2022-08-27 PROCEDURE — 80048 BASIC METABOLIC PNL TOTAL CA: CPT | Performed by: INTERNAL MEDICINE

## 2022-08-27 PROCEDURE — 84443 ASSAY THYROID STIM HORMONE: CPT | Performed by: INTERNAL MEDICINE

## 2022-08-27 PROCEDURE — 94640 AIRWAY INHALATION TREATMENT: CPT

## 2022-08-27 PROCEDURE — 94760 N-INVAS EAR/PLS OXIMETRY 1: CPT

## 2022-08-27 RX ORDER — ACETYLCYSTEINE 200 MG/ML
3 SOLUTION ORAL; RESPIRATORY (INHALATION) 3 TIMES DAILY
Status: DISCONTINUED | OUTPATIENT
Start: 2022-08-27 | End: 2022-08-28

## 2022-08-27 RX ADMIN — IPRATROPIUM BROMIDE 0.5 MG: 0.5 SOLUTION RESPIRATORY (INHALATION) at 07:06

## 2022-08-27 RX ADMIN — FUROSEMIDE 40 MG: 10 INJECTION, SOLUTION INTRAMUSCULAR; INTRAVENOUS at 16:17

## 2022-08-27 RX ADMIN — GUAIFENESIN 1200 MG: 600 TABLET ORAL at 21:08

## 2022-08-27 RX ADMIN — METHYLPREDNISOLONE SODIUM SUCCINATE 40 MG: 40 INJECTION, POWDER, FOR SOLUTION INTRAMUSCULAR; INTRAVENOUS at 13:50

## 2022-08-27 RX ADMIN — ENOXAPARIN SODIUM 40 MG: 40 INJECTION SUBCUTANEOUS at 08:00

## 2022-08-27 RX ADMIN — METHYLPREDNISOLONE SODIUM SUCCINATE 40 MG: 40 INJECTION, POWDER, FOR SOLUTION INTRAMUSCULAR; INTRAVENOUS at 21:08

## 2022-08-27 RX ADMIN — ALBUTEROL SULFATE 2 PUFF: 90 AEROSOL, METERED RESPIRATORY (INHALATION) at 05:30

## 2022-08-27 RX ADMIN — ASPIRIN 81 MG CHEWABLE TABLET 81 MG: 81 TABLET CHEWABLE at 07:58

## 2022-08-27 RX ADMIN — IPRATROPIUM BROMIDE 0.5 MG: 0.5 SOLUTION RESPIRATORY (INHALATION) at 19:29

## 2022-08-27 RX ADMIN — LEVALBUTEROL 1.25 MG: 1.25 SOLUTION, CONCENTRATE RESPIRATORY (INHALATION) at 07:06

## 2022-08-27 RX ADMIN — GUAIFENESIN 1200 MG: 600 TABLET ORAL at 07:58

## 2022-08-27 RX ADMIN — FUROSEMIDE 40 MG: 10 INJECTION, SOLUTION INTRAMUSCULAR; INTRAVENOUS at 08:00

## 2022-08-27 RX ADMIN — METHYLPREDNISOLONE SODIUM SUCCINATE 40 MG: 40 INJECTION, POWDER, FOR SOLUTION INTRAMUSCULAR; INTRAVENOUS at 05:26

## 2022-08-27 RX ADMIN — IPRATROPIUM BROMIDE 0.5 MG: 0.5 SOLUTION RESPIRATORY (INHALATION) at 13:02

## 2022-08-27 RX ADMIN — ACETYLCYSTEINE 600 MG: 200 SOLUTION ORAL; RESPIRATORY (INHALATION) at 19:29

## 2022-08-27 RX ADMIN — LEVALBUTEROL 1.25 MG: 1.25 SOLUTION, CONCENTRATE RESPIRATORY (INHALATION) at 19:29

## 2022-08-27 RX ADMIN — PRAVASTATIN SODIUM 80 MG: 80 TABLET ORAL at 16:17

## 2022-08-27 RX ADMIN — LEVALBUTEROL 1.25 MG: 1.25 SOLUTION, CONCENTRATE RESPIRATORY (INHALATION) at 13:02

## 2022-08-27 RX ADMIN — AZITHROMYCIN MONOHYDRATE 250 MG: 250 TABLET ORAL at 11:03

## 2022-08-27 NOTE — ASSESSMENT & PLAN NOTE
Wt Readings from Last 3 Encounters:   08/27/22 53 3 kg (117 lb 8 oz)   08/26/22 53 5 kg (118 lb)   05/19/22 53 6 kg (118 lb 3 2 oz)     · POA, has history of chronic systolic CHF, not currently on diuretics  Presented with progressively worsening shortness of breath, orthopnea, PND and bilateral lower extremity pitting edema  Denies chest pain and trop delta was 7  · Per patient was taken off metoprolol by his cardiologist in the past   History of LBBB  · Last echocardiogram on file was in December of 2019 which showed EF 45-55%  Diastolic function was unable to be evaluated  Repeat echo pending    Cardiology consult appreciated  · Start on Lasix 40 mg IV b i d , daily weights, I's and O's, 2 g salt diet  · Follows outpatient with Dr Richa Frank

## 2022-08-27 NOTE — PROGRESS NOTES
[facility]  Progress Note - Dony Rodriguez  1955, 74 y o  male MRN: [id number]  Unit/Bed#: S -84 Encounter: [id number]  Primary Care Provider: Nilesh Stern DO   Date and time admitted to hospital: 8/26/2022 10:18 AM    Acute exacerbation of chronic obstructive pulmonary disease (COPD) ([address] )  Assessment & Plan  · POA, reported with hypoxia, wheezing, respiratory distress and increased mucus production  Currently on 3 L at rest with sats 90%  Decreased to 75% with exertion in the office  · Chest x-ray suggests emphysema with no evidence of pneumonia  continue IV steroids 40 mg t i d   · Per pulmonology office notes, he is on azithromycin 250mg daily chronically for pulmonary emphysema  Inpatient pulmonary support appreciated  · Procalcitonin negative, sputum culture and Gram stain pending, COVID/flu/RSV PCR negative  · Continue respiratory protocol, nebs q i d  · Was on Trelegy in the past but patient states no longer on it due to cost    Acute systolic congestive heart failure [facility])  Assessment & Plan  Wt Readings from Last 3 Encounters:   08/27/22 53 3 kg (117 lb 8 oz)   08/26/22 53 5 kg (118 lb)   05/19/22 53 6 kg (118 lb 3 2 oz)     · POA, has history of chronic systolic CHF, not currently on diuretics  Presented with progressively worsening shortness of breath, orthopnea, PND and bilateral lower extremity pitting edema  Denies chest pain and trop delta was 7  · Per patient was taken off metoprolol by his cardiologist in the past   History of LBBB  · Last echocardiogram on file was in December of 2019 which showed EF 45-55%  Diastolic function was unable to be evaluated  Repeat echo pending    Cardiology consult appreciated  · Start on Lasix 40 mg IV b i d , daily weights, I's and O's, 2 g salt diet  · Follows outpatient with Dr Paris Becker    Mild protein-calorie malnutrition [facility])  Assessment & Plan  Malnutrition Findings:   Adult Malnutrition type: Acute illness (in the setting of chronic illness)  Adult Degree of Malnutrition: Malnutrition of mild degree  Malnutrition Characteristics: Fat loss, Muscle loss                  360 Statement: Mild malnutrition related to catabolic illness as evidenced by lower BMI, loss of fat and muscle extremities, clavicle  Currently treated with nutrition consult, nutriiton education  BMI Findings: Body mass index is 21 49 kg/m²  Pulmonary nodules/lesions, multiple  Assessment & Plan  · Follows outpatient with Dr Anthony Olivares  Her last CT of the chest was on 6/13/22  · Scheduled for repeat CT chest in 6 months    * Acute on chronic respiratory failure with hypoxia (HCC)  Assessment & Plan  · POA, sent in from his pulmonologist office after he presented respiratory distress diffuse wheezing increased mucus production extremity pitting edema  · Reported with desats to 75% with exertion while on his home rate of 3 L nasal O2  · Also admits to 3 months history progressively worsening shortness of breath with associated orthopnea and PND  · At the time of admission, he was on 3 L with sats 90%  Suspected multifactorial due to COPD and CHF exacerbation  · Admitted to Med surg, started on IV steroids for COPD exacerbation as well as IV Lasix for CHF exacerbation   · Respiratory protocol, nebs q i d , check chest x-ray Pulmonology consult            VTE Pharmacologic Prophylaxis: VTE Score: 4 Moderate Risk (Score 3-4) - Pharmacological DVT Prophylaxis Ordered: enoxaparin (Lovenox)  Patient Centered Rounds: Nursing was not present for rounds  Discussions with Specialists or Other Care Team Provider:  Pulmonary and cardiology notes reviewed    Education and Discussions with Family / Patient: Updated  (wife) at bedside  His wife Bella Agudelo was updated    Time Spent for Care: 30 minutes  More than 50% of total time spent on counseling and coordination of care as described above      Current Length of Stay: 1 day(s)  Current Patient Status: Inpatient   Certification Statement: The patient will continue to require additional inpatient hospital stay due to Acute hypoxic respiratory failure  Discharge Plan: Anticipate discharge in 48-72 hrs to home  Code Status: Level 1 - Full Code    Subjective:   He reports feeling better  He notes he still remains short of breath  He denies any chest pain  His wife points out that he had increased edema yesterday and that they look improved today and commenting on his ankles  He denies any abdominal pain  He does report a productive cough  He denies any fevers or chills  He notes he is urinating frequently  Objective:     Vitals:   Temp (24hrs), Av °F (36 7 °C), Min:97 5 °F (36 4 °C), Max:98 2 °F (36 8 °C)    Temp:  [97 5 °F (36 4 °C)-98 2 °F (36 8 °C)] 98 2 °F (36 8 °C)  HR:  [] 109  Resp:  [16-20] 16  BP: (106-128)/(58-75) 106/73  SpO2:  [90 %-99 %] 93 %  Body mass index is 21 49 kg/m²  Input and Output Summary (last 24 hours): Intake/Output Summary (Last 24 hours) at 2022 0826  Last data filed at 2022 7696  Gross per 24 hour   Intake 360 ml   Output 2325 ml   Net -1965 ml       Physical Exam:   Physical Exam  Vitals and nursing note reviewed  Constitutional:       Appearance: Normal appearance  He is not ill-appearing or diaphoretic  HENT:      Head: Normocephalic  Nose: Nose normal       Comments: He is on 3 L nasal cannula     Mouth/Throat:      Mouth: Mucous membranes are moist       Pharynx: No oropharyngeal exudate  Eyes:      General: No scleral icterus  Conjunctiva/sclera: Conjunctivae normal    Cardiovascular:      Rate and Rhythm: Normal rate and regular rhythm  Pulmonary:      Effort: Pulmonary effort is normal  No respiratory distress  Breath sounds: Wheezing present  Abdominal:      General: Bowel sounds are normal  There is no distension  Palpations: Abdomen is soft  Tenderness: There is no abdominal tenderness  Musculoskeletal:      Cervical back: Normal range of motion and neck supple  No rigidity  Right lower leg: Edema present  Left lower leg: Edema present  Skin:     General: Skin is warm  Coloration: Skin is jaundiced  Neurological:      Mental Status: He is alert and oriented to person, place, and time  Psychiatric:         Mood and Affect: Mood normal          Behavior: Behavior normal          Additional Data:     Labs:  Results from last 7 days   Lab Units 08/26/22  1230   WBC Thousand/uL 8 55   HEMOGLOBIN g/dL 12 5   HEMATOCRIT % 40 2   PLATELETS Thousands/uL 234   BANDS PCT % 3   LYMPHO PCT % 1*   MONO PCT % 3*   EOS PCT % 0     Results from last 7 days   Lab Units 08/26/22  1230   SODIUM mmol/L 137   POTASSIUM mmol/L 4 3   CHLORIDE mmol/L 101   CO2 mmol/L 32   BUN mg/dL 12   CREATININE mg/dL 0 72   ANION GAP mmol/L 4   CALCIUM mg/dL 8 5   ALBUMIN g/dL 3 8   TOTAL BILIRUBIN mg/dL 0 64   ALK PHOS U/L 94   ALT U/L 18   AST U/L 14   GLUCOSE RANDOM mg/dL 137                 Results from last 7 days   Lab Units 08/26/22  1230   PROCALCITONIN ng/ml <0 05       Lines/Drains:  Invasive Devices  Report    Peripheral Intravenous Line  Duration           Peripheral IV 08/26/22 Right Antecubital <1 day                  Telemetry:  Telemetry Orders (From admission, onward)             48 Hour Telemetry Monitoring  Continuous x 48 hours        References:    Telemetry Guidelines   Question:  Reason for 48 Hour Telemetry  Answer:  Acute Decompensated CHF (continuous diuretic infusion or total diuretic dose > 200 mg daily, associated electrolyte derangement, ionotropic drip, history of ventricular arrhythmia, or new EF <35%)                 Telemetry Reviewed: Normal Sinus Rhythm  Indication for Continued Telemetry Use: Acute CHF on >200 mg lasix/day or equivalent dose or with new reduced EF                Imaging: Personally reviewed the following imaging: chest xray    Recent Cultures (last 7 days): Last 24 Hours Medication List:   Current Facility-Administered Medications   Medication Dose Route Frequency Provider Last Rate    albuterol  2 puff Inhalation Q4H PRN Angélica Borden MD      aspirin  81 mg Oral Daily Laila Sherman MD      azithromycin  250 mg Oral Q24H Laila Sherman MD      enoxaparin  40 mg Subcutaneous Daily Laila Sherman MD      furosemide  40 mg Intravenous BID Angélica Borden MD      guaiFENesin  1,200 mg Oral Q12H [address] Laila Sherman MD      ipratropium  0 5 mg Nebulization TID Angélica Borden MD      levalbuterol  1 25 mg Nebulization TID Angélica Borden MD      methylPREDNISolone sodium succinate  40 mg Intravenous Q8H Dwight Acevdeo MD      pravastatin  80 mg Oral Daily With Karla Jennings MD          Today, Patient Was Seen By: Keshia Templeton MD    **Please Note: This note may have been constructed using a voice recognition system  **

## 2022-08-27 NOTE — PLAN OF CARE
Problem: Nutrition/Hydration-ADULT  Goal: Nutrient/Hydration intake appropriate for improving, restoring or maintaining nutritional needs  Description: Monitor and assess patient's nutrition/hydration status for malnutrition  Collaborate with interdisciplinary team and initiate plan and interventions as ordered  Monitor patient's weight and dietary intake as ordered or per policy  Utilize nutrition screening tool and intervene as necessary  Determine patient's food preferences and provide high-protein, high-caloric foods as appropriate       INTERVENTIONS:  - Monitor oral intake, urinary output, labs, and treatment plans  - Assess nutrition and hydration status and recommend course of action  - Evaluate amount of meals eaten  - Assist patient with eating if necessary   - Allow adequate time for meals  - Recommend/ encourage appropriate diets, oral nutritional supplements, and vitamin/mineral supplements  - Order, calculate, and assess calorie counts as needed  - Recommend, monitor, and adjust tube feedings and TPN/PPN based on assessed needs  - Assess need for intravenous fluids  - Provide specific nutrition/hydration education as appropriate  - Include patient/family/caregiver in decisions related to nutrition  Outcome: Progressing     Problem: PAIN - ADULT  Goal: Verbalizes/displays adequate comfort level or baseline comfort level  Description: Interventions:  - Encourage patient to monitor pain and request assistance  - Assess pain using appropriate pain scale  - Administer analgesics based on type and severity of pain and evaluate response  - Implement non-pharmacological measures as appropriate and evaluate response  - Consider cultural and social influences on pain and pain management  - Notify physician/advanced practitioner if interventions unsuccessful or patient reports new pain  Outcome: Progressing     Problem: INFECTION - ADULT  Goal: Absence or prevention of progression during hospitalization  Description: INTERVENTIONS:  - Assess and monitor for signs and symptoms of infection  - Monitor lab/diagnostic results  - Monitor all insertion sites, i e  indwelling lines, tubes, and drains  - Monitor endotracheal if appropriate and nasal secretions for changes in amount and color  - [place] appropriate cooling/warming therapies per order  - Administer medications as ordered  - Instruct and encourage patient and family to use good hand hygiene technique  - Identify and instruct in appropriate isolation precautions for identified infection/condition  Outcome: Progressing  Goal: Absence of fever/infection during neutropenic period  Description: INTERVENTIONS:  - Monitor WBC    Outcome: Progressing     Problem: SAFETY ADULT  Goal: Patient will remain free of falls  Description: INTERVENTIONS:  - Educate patient/family on patient safety including physical limitations  - Instruct patient to call for assistance with activity   - Consult OT/PT to assist with strengthening/mobility   - Keep Call bell within reach  - Keep bed low and locked with side rails adjusted as appropriate  - Keep care items and personal belongings within reach  - Initiate and maintain comfort rounds  - Make Fall Risk Sign visible to staff  - Offer Toileting every  Hours, in advance of need  - Initiate/Maintain alarm  - Obtain necessary fall risk management equipment:   - Apply yellow socks and bracelet for high fall risk patients  - Consider moving patient to room near nurses station  Outcome: Progressing  Goal: Maintain or return to baseline ADL function  Description: INTERVENTIONS:  -  Assess patient's ability to carry out ADLs; assess patient's baseline for ADL function and identify physical deficits which impact ability to perform ADLs (bathing, care of mouth/teeth, toileting, grooming, dressing, etc )  - Assess/evaluate cause of self-care deficits   - Assess range of motion  - Assess patient's mobility; develop plan if impaired  - Assess patient's need for assistive devices and provide as appropriate  - Encourage maximum independence but intervene and supervise when necessary  - Involve family in performance of ADLs  - Assess for home care needs following discharge   - Consider OT consult to assist with ADL evaluation and planning for discharge  - Provide patient education as appropriate  Outcome: Progressing  Goal: Maintains/Returns to pre admission functional level  Description: INTERVENTIONS:  - Perform BMAT or MOVE assessment daily    - Set and communicate daily mobility goal to care team and patient/family/caregiver  - Collaborate with rehabilitation services on mobility goals if consulted  - Perform Range of Motion  times a day  - Reposition patient every  hours    - Dangle patient  times a day  - Stand patient  times a day  - Ambulate patient  times a day  - Out of bed to chair  times a day   - Out of bed for meals  times a day  - Out of bed for toileting  - Record patient progress and toleration of activity level   Outcome: Progressing     Problem: DISCHARGE PLANNING  Goal: Discharge to home or other facility with appropriate resources  Description: INTERVENTIONS:  - Identify barriers to discharge w/patient and caregiver  - Arrange for needed discharge resources and transportation as appropriate  - Identify discharge learning needs (meds, wound care, etc )  - Arrange for interpretive services to assist at discharge as needed  - Refer to Case Management Department for coordinating discharge planning if the patient needs post-hospital services based on physician/advanced practitioner order or complex needs related to functional status, cognitive ability, or social support system  Outcome: Progressing     Problem: Knowledge Deficit  Goal: Patient/family/caregiver demonstrates understanding of disease process, treatment plan, medications, and discharge instructions  Description: Complete learning assessment and assess knowledge base    Interventions:  - Provide teaching at level of understanding  - Provide teaching via preferred learning methods  Outcome: Progressing     Problem: Potential for Falls  Goal: Patient will remain free of falls  Description: INTERVENTIONS:  - Educate patient/family on patient safety including physical limitations  - Instruct patient to call for assistance with activity   - Consult OT/PT to assist with strengthening/mobility   - Keep Call bell within reach  - Keep bed low and locked with side rails adjusted as appropriate  - Keep care items and personal belongings within reach  - Initiate and maintain comfort rounds  - Make Fall Risk Sign visible to staff  - Offer Toileting every Hours, in advance of need  - Initiate/Maintain Alarm  - Obtain necessary fall risk management equipment:   - Apply yellow socks and bracelet for high fall risk patients  - Consider moving patient to room near nurses station  Outcome: Progressing

## 2022-08-27 NOTE — ASSESSMENT & PLAN NOTE
· Follows outpatient with Dr Enrique Vega    Her last CT of the chest was on 6/13/22  · Scheduled for repeat CT chest in 6 months

## 2022-08-27 NOTE — RESPIRATORY THERAPY NOTE
RT Protocol Note  Elena Masters  74 y o  male MRN: [id number]  Unit/Bed#: S -01 Encounter: [id number]    Assessment    Principal Problem:    Acute on chronic respiratory failure with hypoxia ([facility] )  Active Problems:    Acute systolic congestive heart failure (HCC)    Acute exacerbation of chronic obstructive pulmonary disease (COPD) ([facility] )    Pulmonary nodules/lesions, multiple    Mild protein-calorie malnutrition (HCC)      Home Pulmonary Medications:  Duo-neb nebulizer  Albuterol MDI  Home Devices/Therapy: Home O2, BiPAP/CPAP    Past Medical History:   Diagnosis Date    Acute metabolic encephalopathy 4/31/6205    Arthritis     Bladder mass     Cardiomyopathy (HCC)     Chest pain     COPD (chronic obstructive pulmonary disease) ([facility])     CPAP (continuous positive airway pressure) dependence     Emphysema of lung ([facility])     Hypoxia     nocturnal    Left bundle branch block     Multiple pulmonary nodules     last assessed: 10/12/16    Pneumonia     Sleep apnea     Sleep apnea, obstructive     Smoker     Weight loss 8/29/2019     Social History     Socioeconomic History    Marital status: /Civil Union     Spouse name: None    Number of children: None    Years of education: None    Highest education level: None   Occupational History    None   Tobacco Use    Smoking status: Former Smoker     Packs/day: 2 50     Years: 37 00     Pack years: 92 50     Types: Cigarettes     Start date: 1970     Quit date: 2012     Years since quitting: 10 6    Smokeless tobacco: Former User    Tobacco comment: 1 ppd for 37 years, 2010 down to 5 cigs a day, is around second hand smoke   Vaping Use    Vaping Use: Never used   Substance and Sexual Activity    Alcohol use: Not Currently     Comment: rarely    Drug use: No    Sexual activity: Not Currently     Partners: Female   Other Topics Concern    None   Social History Narrative    Daily coffee consumption: 8 or more cups a day         Social Determinants of Health Financial Resource Strain: Not on file   Food Insecurity: No Food Insecurity    Worried About [address] in the Last Year: Never true    Ran Out of Food in the Last Year: Never true   Transportation Needs: No Transportation Needs    Lack of Transportation (Medical): No    Lack of Transportation (Non-Medical): No   Physical Activity: Not on file   Stress: Not on file   Social Connections: Not on file   Intimate Partner Violence: Not on file   Housing Stability: Unknown    Unable to Pay for Housing in the Last Year: No    Number of Places Lived in the Last Year: Not on file    Unstable Housing in the Last Year: No       Subjective         Objective    Physical Exam:   Assessment Type: Pre-treatment  General Appearance: Alert, Awake  Respiratory Pattern: Normal  Chest Assessment: Chest expansion symmetrical  Bilateral Breath Sounds: Diminished, Expiratory wheezes  Cough: None    Vitals:  Blood pressure 98/63, pulse 84, temperature 98 °F (36 7 °C), resp  rate 18, height 5' 2" (1 575 m), weight 53 3 kg (117 lb 8 oz), SpO2 95 %  Results from last 7 days   Lab Units 08/26/22  1630   PH ART  7 390   PCO2 ART mm Hg 57 5*   PO2 ART mm Hg 85 1   HCO3 ART mmol/L 34 0*   BASE EXC ART mmol/L 7 2   O2 CONTENT ART mL/dL 17 8   O2 HGB, ARTERIAL % 92 9*   ABG SOURCE  Radial, Left   CANELO TEST  Yes       Imaging and other studies: I have personally reviewed pertinent reports              Plan       Airway Clearance Plan: Discontinue Protocol     Resp Comments: 3L NC

## 2022-08-27 NOTE — ASSESSMENT & PLAN NOTE
Malnutrition Findings:   Adult Malnutrition type: Acute illness (in the setting of chronic illness)  Adult Degree of Malnutrition: Malnutrition of mild degree  Malnutrition Characteristics: Fat loss, Muscle loss                  360 Statement: Mild malnutrition related to catabolic illness as evidenced by lower BMI, loss of fat and muscle extremities, clavicle  Currently treated with nutrition consult, nutriiton education  BMI Findings: Body mass index is 21 49 kg/m²

## 2022-08-27 NOTE — ASSESSMENT & PLAN NOTE
· POA, sent in from his pulmonologist office after he presented respiratory distress diffuse wheezing increased mucus production extremity pitting edema  · Reported with desats to 75% with exertion while on his home rate of 3 L nasal O2  · Also admits to 3 months history progressively worsening shortness of breath with associated orthopnea and PND  · At the time of admission, he was on 3 L with sats 90%    Suspected multifactorial due to COPD and CHF exacerbation  · Admitted to Med surg, started on IV steroids for COPD exacerbation as well as IV Lasix for CHF exacerbation   · Respiratory protocol, nebs q i d , check chest x-ray Pulmonology consult

## 2022-08-27 NOTE — PROGRESS NOTES
Cardiology Progress Note - Sergio Becker  74 y o  male MRN: [id number]    Unit/Bed#: S -01 Encounter: [id number]        Hospital Problems:  Principal Problem:    Acute on chronic respiratory failure with hypoxia ([facility] )  Active Problems:    Acute systolic congestive heart failure (HCC)    Acute exacerbation of chronic obstructive pulmonary disease (COPD) ([facility] )    Pulmonary nodules/lesions, multiple    Mild protein-calorie malnutrition (HCC)      Assessment/Recommendations:  Acute on chronic hypoxic respiratory failure related to a combination of COPD and heart failure exacerbation  Chronic heart failure with reduced EF, mild exacerbation, good response to IV diuretics  Edema resolved  Nonobstructive CAD:  No angina  Nonischemic left bundle branch block    Recommendations:  Continue IV diuretics for another day  Check BMP  Continue bronchodilators and steroids  Plan of care discussed with patient and family at bedside  Subjective:     Patient seen and examined  Family at bedside  Overnight events reviewed  He had a better night  Cough and wheezing have improved  He has no orthopnea  He has no residual swelling  He has diuresed fairly well  Review of Systems   Review of ten system was conducted and was negative except for findings stated  Reports wheezing  Reports generalized weakness  Still reports dyspnea  Reports cough          Current Facility-Administered Medications:     albuterol (PROVENTIL HFA,VENTOLIN HFA) inhaler 2 puff, 2 puff, Inhalation, Q4H PRN, Roderick Brown MD, 2 puff at 08/27/22 0530    aspirin chewable tablet 81 mg, 81 mg, Oral, Daily, Laila Islas MD, 81 mg at 08/27/22 0758    azithromycin (ZITHROMAX) tablet 250 mg, 250 mg, Oral, Q24H, Laila Islas MD, 250 mg at 08/26/22 1318    enoxaparin (LOVENOX) subcutaneous injection 40 mg, 40 mg, Subcutaneous, Daily, Laila Islas MD, 40 mg at 08/27/22 0800    furosemide (LASIX) injection 40 mg, 40 mg, Intravenous, BID, Laila Mendez MD, 40 mg at 08/27/22 0800    guaiFENesin (MUCINEX) 12 hr tablet 1,200 mg, 1,200 mg, Oral, Q12H [address], Laila Mendez MD, 1,200 mg at 08/27/22 0758    ipratropium (ATROVENT) 0 02 % inhalation solution 0 5 mg, 0 5 mg, Nebulization, TID, Maritza Soler MD, 0 5 mg at 08/27/22 0706    levalbuterol (XOPENEX) inhalation solution 1 25 mg, 1 25 mg, Nebulization, TID, Maritza Soler MD, 1 25 mg at 08/27/22 0706    methylPREDNISolone sodium succinate (Solu-MEDROL) injection 40 mg, 40 mg, Intravenous, Q8H [address], Laila Mendez MD, 40 mg at 08/27/22 0526    pravastatin (PRAVACHOL) tablet 80 mg, 80 mg, Oral, Daily With eLena Camacho MD, 80 mg at 08/26/22 1720     Objective:     Vitals:   Blood pressure 106/73, pulse (!) 109, temperature 98 2 °F (36 8 °C), resp  rate 16, height 5' 2" (1 575 m), weight 53 3 kg (117 lb 8 oz), SpO2 93 %  Body mass index is 21 49 kg/m²  Orthostatic Blood Pressures    Flowsheet Row Most Recent Value   Blood Pressure 106/73 filed at 08/27/2022 6598         Systolic (88UNK), EYJ:544 , Min:106 , HLA:963     Diastolic (30PRE), EEA:98, Min:67, Max:75      Intake/Output Summary (Last 24 hours) at 8/27/2022 0942  Last data filed at 8/27/2022 0647  Gross per 24 hour   Intake 360 ml   Output 2325 ml   Net -1965 ml     Weight (last 2 days)     Date/Time Weight    08/27/22 0600 53 3 (117 5)    08/26/22 1315 53 5 (118)    08/26/22 1059 53 5 (118)            Physical Exam  Constitutional:       General: He is not in acute distress  HENT:      Nose: No congestion  Mouth/Throat:      Mouth: Mucous membranes are dry  Eyes:      General: No scleral icterus  Cardiovascular:      Heart sounds: Heart sounds are distant  Comments: Trace LE edema  Pulmonary:      Effort: Tachypnea present  Breath sounds: Decreased air movement present  Decreased breath sounds and wheezing present     Abdominal: Tenderness: There is no abdominal tenderness  Musculoskeletal:         General: No deformity  Skin:     General: Skin is warm  Neurological:      Mental Status: He is alert  Mental status is at baseline  Psychiatric:         Mood and Affect: Mood normal            Labs & Results:        Results from last 7 days   Lab Units 08/26/22  1230   WBC Thousand/uL 8 55   HEMOGLOBIN g/dL 12 5   HEMATOCRIT % 40 2   PLATELETS Thousands/uL 234         Results from last 7 days   Lab Units 08/26/22  1230   POTASSIUM mmol/L 4 3   CHLORIDE mmol/L 101   CO2 mmol/L 32   BUN mg/dL 12   CREATININE mg/dL 0 72   CALCIUM mg/dL 8 5   ALK PHOS U/L 94   ALT U/L 18   AST U/L 14             Recent Labs     08/26/22  1230   NTBNP 1,685*          Imaging Studies:     XR chest pa & lateral    Result Date: 8/26/2022  Narrative: CHEST INDICATION:   Shortness of breath  80-year-old male  Recent CT of the chest demonstrated emphysema and several small nodules  COMPARISON:  March 27, 2022  EXAM PERFORMED/VIEWS:  XR CHEST PA & LATERAL  The frontal view was performed utilizing dual energy radiographic technique  FINDINGS: Cardiomediastinal silhouette appears unremarkable  Lungs clear and hyperinflated  No evidence of pleural effusion or pneumothorax  Diffuse demineralization of the imaged portions of the skeleton  Compression deformities of multiple mid thoracic vertebrae with resultant accentuated kyphosis  Bones otherwise unremarkable  Impression: Emphysema  No evidence of acute abnormality in the chest  Workstation performed: [id number]     Echo complete    Result Date: 8/26/2022  Narrative: Lily Morris  Left Ventricle: Left ventricular cavity size is normal  Wall thickness is mildly increased  The left ventricular ejection fraction is 40%  Systolic function is moderately reduced  There is moderate global hypokinesis with regional variation  Diastolic function is abnormal    Right Ventricle: Right ventricular cavity size is mildly dilated  Systolic function is normal    Mitral Valve: There is mild regurgitation    Tricuspid Valve: There is mild regurgitation  The estimated right ventricular systolic pressure is 48 04 mmHg  Available cardiology studies, imaging and lab results independently reviewed today  This note was completed in part utilizing m-modal fluency direct voice recognition software  Grammatical errors, random word insertion, spelling mistakes, occasional wrong word or "sound-alike" substitutions and incomplete sentences may be an occasional consequence of the system secondary to software limitations, ambient noise and hardware issues  At the time of dictation, efforts were made to edit, clarify and /or correct errors  Please read the chart carefully and recognize, using context, where substitutions have occurred  If you have any questions or concerns about the context, text or information contained within the body of this dictation, please contact myself, the provider, for further clarification

## 2022-08-27 NOTE — PROGRESS NOTES
Progress Note - Pulmonary   Stewart Seip  74 y o  male MRN: [id number]  Unit/Bed#: S -01 Encounter: [id number]    Assessment & Plan:  COPD with acute exacerbation  Acute on chronic systolic congestive heart failure  Chronic hypoxia and hypercapnia  Pulmonary nodules    · Patient currently stable on his baseline 3 L by nasal cannula  Reinforced the need for BiPAP compliance with him and his wife at bedside  He does have a machine at home  · Continue Solu-Medrol 40 mg q 8 hours, DuoNebs, azithromycin  · Would discharge patient on his usual regimen Trelegy 1 puff daily with DuoNebs q 6 hours, and albuterol inhaler as needed  · Diuretics per primary/cardiology  · Close outpatient pulmonary follow-up    Subjective:   Patient says his breathing is better today than it was yesterday  He would like to home soon  Objective:     Vitals: Blood pressure 106/73, pulse (!) 109, temperature 98 2 °F (36 8 °C), resp  rate 16, height 5' 2" (1 575 m), weight 53 3 kg (117 lb 8 oz), SpO2 93 %  ,Body mass index is 21 49 kg/m²  Intake/Output Summary (Last 24 hours) at 8/27/2022 1143  Last data filed at 8/27/2022 1104  Gross per 24 hour   Intake 840 ml   Output 3275 ml   Net -2435 ml       Invasive Devices  Report    Peripheral Intravenous Line  Duration           Peripheral IV 08/26/22 Right Antecubital <1 day                Physical Exam:   General appearance: Alert and oriented, in no acute distress  Head: Normocephalic, without obvious abnormality, atraumatic  Eyes: EOMI  No discharge bilaterally  No scleral icterus  Neck: Supple, symmetrical, trachea midline  Lungs:  Decreased air movement with bilateral expiratory wheezing  Heart: Regular rate and rhythm, S1, S2 normal, no murmur  Abdomen:  No appreciable distension or tenderness  Extremities:  Lower extremity edema present  Skin: Warm and dry  Neurologic: No acute focal deficits are noted    Labs: I have personally reviewed pertinent lab results    Imaging and other studies: I have personally reviewed pertinent reports

## 2022-08-28 PROCEDURE — 99232 SBSQ HOSP IP/OBS MODERATE 35: CPT | Performed by: INTERNAL MEDICINE

## 2022-08-28 PROCEDURE — 94760 N-INVAS EAR/PLS OXIMETRY 1: CPT

## 2022-08-28 PROCEDURE — 94640 AIRWAY INHALATION TREATMENT: CPT

## 2022-08-28 RX ORDER — METHYLPREDNISOLONE SODIUM SUCCINATE 40 MG/ML
40 INJECTION, POWDER, LYOPHILIZED, FOR SOLUTION INTRAMUSCULAR; INTRAVENOUS EVERY 12 HOURS SCHEDULED
Status: DISCONTINUED | OUTPATIENT
Start: 2022-08-28 | End: 2022-08-29

## 2022-08-28 RX ORDER — ACETYLCYSTEINE 200 MG/ML
3 SOLUTION ORAL; RESPIRATORY (INHALATION) 3 TIMES DAILY
Status: DISCONTINUED | OUTPATIENT
Start: 2022-08-28 | End: 2022-08-29 | Stop reason: HOSPADM

## 2022-08-28 RX ORDER — FUROSEMIDE 40 MG/1
40 TABLET ORAL DAILY
Status: DISCONTINUED | OUTPATIENT
Start: 2022-08-29 | End: 2022-08-29 | Stop reason: HOSPADM

## 2022-08-28 RX ORDER — BUDESONIDE 0.5 MG/2ML
0.5 INHALANT ORAL
Status: DISCONTINUED | OUTPATIENT
Start: 2022-08-28 | End: 2022-08-29 | Stop reason: HOSPADM

## 2022-08-28 RX ADMIN — LEVALBUTEROL 1.25 MG: 1.25 SOLUTION, CONCENTRATE RESPIRATORY (INHALATION) at 20:05

## 2022-08-28 RX ADMIN — BUDESONIDE 0.5 MG: 0.5 INHALANT ORAL at 13:13

## 2022-08-28 RX ADMIN — IPRATROPIUM BROMIDE 0.5 MG: 0.5 SOLUTION RESPIRATORY (INHALATION) at 20:05

## 2022-08-28 RX ADMIN — METHYLPREDNISOLONE SODIUM SUCCINATE 40 MG: 40 INJECTION, POWDER, FOR SOLUTION INTRAMUSCULAR; INTRAVENOUS at 20:43

## 2022-08-28 RX ADMIN — AZITHROMYCIN MONOHYDRATE 250 MG: 250 TABLET ORAL at 10:50

## 2022-08-28 RX ADMIN — METHYLPREDNISOLONE SODIUM SUCCINATE 40 MG: 40 INJECTION, POWDER, FOR SOLUTION INTRAMUSCULAR; INTRAVENOUS at 05:53

## 2022-08-28 RX ADMIN — ASPIRIN 81 MG CHEWABLE TABLET 81 MG: 81 TABLET CHEWABLE at 08:14

## 2022-08-28 RX ADMIN — ACETYLCYSTEINE 600 MG: 200 SOLUTION ORAL; RESPIRATORY (INHALATION) at 13:13

## 2022-08-28 RX ADMIN — ACETYLCYSTEINE 600 MG: 200 SOLUTION ORAL; RESPIRATORY (INHALATION) at 07:09

## 2022-08-28 RX ADMIN — IPRATROPIUM BROMIDE 0.5 MG: 0.5 SOLUTION RESPIRATORY (INHALATION) at 07:09

## 2022-08-28 RX ADMIN — PRAVASTATIN SODIUM 80 MG: 80 TABLET ORAL at 16:00

## 2022-08-28 RX ADMIN — LEVALBUTEROL 1.25 MG: 1.25 SOLUTION, CONCENTRATE RESPIRATORY (INHALATION) at 07:09

## 2022-08-28 RX ADMIN — ACETYLCYSTEINE 600 MG: 200 SOLUTION ORAL; RESPIRATORY (INHALATION) at 20:05

## 2022-08-28 RX ADMIN — IPRATROPIUM BROMIDE 0.5 MG: 0.5 SOLUTION RESPIRATORY (INHALATION) at 13:13

## 2022-08-28 RX ADMIN — ENOXAPARIN SODIUM 40 MG: 40 INJECTION SUBCUTANEOUS at 08:14

## 2022-08-28 RX ADMIN — GUAIFENESIN 1200 MG: 600 TABLET ORAL at 08:14

## 2022-08-28 RX ADMIN — BUDESONIDE 0.5 MG: 0.5 INHALANT ORAL at 20:05

## 2022-08-28 RX ADMIN — GUAIFENESIN 1200 MG: 600 TABLET ORAL at 20:43

## 2022-08-28 RX ADMIN — LEVALBUTEROL 1.25 MG: 1.25 SOLUTION, CONCENTRATE RESPIRATORY (INHALATION) at 13:13

## 2022-08-28 RX ADMIN — FUROSEMIDE 40 MG: 10 INJECTION, SOLUTION INTRAMUSCULAR; INTRAVENOUS at 08:14

## 2022-08-28 NOTE — PROGRESS NOTES
[facility]  Progress Note - Abril Wynne  1955, 74 y o  male MRN: [id number]  Unit/Bed#: S -25 Encounter: [id number]  Primary Care Provider: Alana Juares DO   Date and time admitted to hospital: 8/26/2022 10:18 AM    Acute exacerbation of chronic obstructive pulmonary disease (COPD) ([facility] )  Assessment & Plan  · POA, reported with hypoxia, wheezing, respiratory distress and increased mucus production  Currently on 3 L at rest with sats 90%  Decreased to 75% with exertion in the office  · Chest x-ray suggests emphysema with no evidence of pneumonia  continue IV steroids 40 mg twice daily today  Still appears very tight so not ready for oral transition  · Per pulmonology office notes, he is on azithromycin 250mg daily chronically for pulmonary emphysema  Inpatient pulmonary support appreciated  · Procalcitonin negative, sputum culture and Gram stain pending, COVID/flu/RSV PCR negative  · Continue respiratory protocol, nebs q i d  · Was on Trelegy in the past but patient states no longer on it due to cost    Acute systolic congestive heart failure [facility])  Assessment & Plan  Wt Readings from Last 3 Encounters:   08/27/22 53 3 kg (117 lb 8 oz)   08/26/22 53 5 kg (118 lb)   05/19/22 53 6 kg (118 lb 3 2 oz)     · POA, has history of chronic systolic CHF, not currently on diuretics  Presented with progressively worsening shortness of breath, orthopnea, PND and bilateral lower extremity pitting edema  Denies chest pain and trop delta was 7  · Per patient was taken off metoprolol by his cardiologist in the past   History of LBBB  · Last echocardiogram on file was in December of 2019 which showed EF 45-55%  Diastolic function was unable to be evaluated  Repeat echo showed ejection fraction 40%  Of note his heart rate was in the low 100s wears his echocardiogram in 2019 had a heart rate in the 70s  Under clear if this is the cause for the decreased ejection fraction  Cardiology consult appreciated  · Transition to oral Lasix today, diuresing well daily weights, I's and O's, 2 g salt diet  · Follows outpatient with Dr Tamera Leblanc    Mild protein-calorie malnutrition [facility])  Assessment & Plan  Malnutrition Findings:   Adult Malnutrition type: Acute illness (in the setting of chronic illness)  Adult Degree of Malnutrition: Malnutrition of mild degree  Malnutrition Characteristics: Fat loss, Muscle loss                  360 Statement: Mild malnutrition related to catabolic illness as evidenced by lower BMI, loss of fat and muscle extremities, clavicle  Currently treated with nutrition consult, nutriiton education  BMI Findings: Body mass index is 21 49 kg/m²  Pulmonary nodules/lesions, multiple  Assessment & Plan  · Follows outpatient with Dr Michal Barthel  Her last CT of the chest was on 6/13/22  · Scheduled for repeat CT chest in 6 months    * Acute on chronic respiratory failure with hypoxia (HCC)  Assessment & Plan  · POA, sent in from his pulmonologist office after he presented respiratory distress diffuse wheezing increased mucus production extremity pitting edema  · Reported with desats to 75% with exertion while on his home rate of 3 L nasal O2  · Also admits to 3 months history progressively worsening shortness of breath with associated orthopnea and PND  · At the time of admission, he was on 3 L with sats 90%  Suspected multifactorial due to COPD and CHF exacerbation  · Admitted to Med surg, started on IV steroids for COPD exacerbation as well as IV Lasix for CHF exacerbation   · Respiratory protocol, nebs q i d  · Improving          VTE Pharmacologic Prophylaxis: VTE Score: 4 Moderate Risk (Score 3-4) - Pharmacological DVT Prophylaxis Ordered: enoxaparin (Lovenox)      Patient Centered Rounds: Nursing was not present for my examination  Discussions with Specialists or Other Care Team Provider:  Discussed with Pulmonary and Cardiology    Education and Discussions with Family / Patient: Updated  (wife) at bedside  Time Spent for Care: 30 minutes  More than 50% of total time spent on counseling and coordination of care as described above  Current Length of Stay: 2 day(s)  Current Patient Status: Inpatient   Certification Statement: The patient will continue to require additional inpatient hospital stay due to Acute hypoxic respiratory failure, COPD and heart failure exacerbation  Discharge Plan: Anticipate discharge tomorrow to home  Code Status: Level 1 - Full Code    Subjective:   He reports improving shortness of breath though does feel still labored on a short trip to the bathroom  He feels his lower extremity edema has improved and is almost back to normal   He denies any chest pain  He denies any abdominal pain  He is urinating frequently  He is disappointed that he cannot go home today  He denies any fevers or chills  His wife is at the bedside during my visit    Objective:     Vitals:   Temp (24hrs), Av 2 °F (36 8 °C), Min:98 °F (36 7 °C), Max:98 3 °F (36 8 °C)    Temp:  [98 °F (36 7 °C)-98 3 °F (36 8 °C)] 98 2 °F (36 8 °C)  HR:  [83-96] 96  Resp:  [18] 18  BP: ()/(63-69) 122/69  SpO2:  [95 %-98 %] 96 %  Body mass index is 21 49 kg/m²  Input and Output Summary (last 24 hours): Intake/Output Summary (Last 24 hours) at 2022 1155  Last data filed at 2022 1051  Gross per 24 hour   Intake 476 ml   Output 2725 ml   Net -2249 ml       Physical Exam:   Physical Exam  Vitals and nursing note reviewed  Constitutional:       Appearance: Normal appearance  He is not ill-appearing or diaphoretic  HENT:      Head: Normocephalic  Nose: Nose normal  No congestion  Mouth/Throat:      Mouth: Mucous membranes are moist       Pharynx: No oropharyngeal exudate  Eyes:      General: No scleral icterus  Conjunctiva/sclera: Conjunctivae normal    Pulmonary:      Effort: Respiratory distress present        Breath sounds: Wheezing present  Comments: Mild respiratory distress on walking back to the bathroom  Abdominal:      General: Bowel sounds are normal  There is no distension  Palpations: Abdomen is soft  Tenderness: There is no abdominal tenderness  Musculoskeletal:      Cervical back: Neck supple  No rigidity  Skin:     General: Skin is warm  Coloration: Skin is not jaundiced  Neurological:      Mental Status: He is alert and oriented to person, place, and time  Psychiatric:         Mood and Affect: Mood normal          Behavior: Behavior normal           Additional Data:     Labs:  Results from last 7 days   Lab Units 08/26/22  1230   WBC Thousand/uL 8 55   HEMOGLOBIN g/dL 12 5   HEMATOCRIT % 40 2   PLATELETS Thousands/uL 234   BANDS PCT % 3   LYMPHO PCT % 1*   MONO PCT % 3*   EOS PCT % 0     Results from last 7 days   Lab Units 08/27/22  0440 08/26/22  1230   SODIUM mmol/L 139 137   POTASSIUM mmol/L 4 5 4 3   CHLORIDE mmol/L 95* 101   CO2 mmol/L 35* 32   BUN mg/dL 18 12   CREATININE mg/dL 0 87 0 72   ANION GAP mmol/L 9 4   CALCIUM mg/dL 8 9 8 5   ALBUMIN g/dL  --  3 8   TOTAL BILIRUBIN mg/dL  --  0 64   ALK PHOS U/L  --  94   ALT U/L  --  18   AST U/L  --  14   GLUCOSE RANDOM mg/dL 157* 137                 Results from last 7 days   Lab Units 08/26/22  1230   PROCALCITONIN ng/ml <0 05       Lines/Drains:  Invasive Devices  Report    Peripheral Intravenous Line  Duration           Peripheral IV 08/26/22 Right Antecubital 1 day                      Imaging: No pertinent imaging reviewed      Recent Cultures (last 7 days):         Last 24 Hours Medication List:   Current Facility-Administered Medications   Medication Dose Route Frequency Provider Last Rate    acetylcysteine  3 mL Nebulization TID Milton Ashley MD      albuterol  2 puff Inhalation Q4H PRN Ray Chisholm MD      aspirin  81 mg Oral Daily Laila Mueller MD      azithromycin  250 mg Oral Q24H Chad Shabazz MD      budesonide  0 5 mg Nebulization Q12H Matthew Mccrary MD      enoxaparin  40 mg Subcutaneous Daily MD Neelam Hannon ON 8/29/2022] furosemide  40 mg Oral Daily Aby Pena MD      guaiFENesin  1,200 mg Oral Q12H [address] Laila Lanier MD      ipratropium  0 5 mg Nebulization TID Chad Shabazz MD      levalbuterol  1 25 mg Nebulization TID Chad Shabazz MD      methylPREDNISolone sodium succinate  40 mg Intravenous Q12H [address] [facility]      pravastatin  80 mg Oral Daily With Jay Jay Mccrary MD          Today, Patient Was Seen By: Kriss León MD    **Please Note: This note may have been constructed using a voice recognition system  **

## 2022-08-28 NOTE — ASSESSMENT & PLAN NOTE
· POA, sent in from his pulmonologist office after he presented respiratory distress diffuse wheezing increased mucus production extremity pitting edema  · Reported with desats to 75% with exertion while on his home rate of 3 L nasal O2  · Also admits to 3 months history progressively worsening shortness of breath with associated orthopnea and PND  · At the time of admission, he was on 3 L with sats 90%  Suspected multifactorial due to COPD and CHF exacerbation  · Admitted to Med surg, started on IV steroids for COPD exacerbation as well as IV Lasix for CHF exacerbation   · Respiratory protocol, nebs q i d    · Improving

## 2022-08-28 NOTE — ASSESSMENT & PLAN NOTE
Wt Readings from Last 3 Encounters:   08/27/22 53 3 kg (117 lb 8 oz)   08/26/22 53 5 kg (118 lb)   05/19/22 53 6 kg (118 lb 3 2 oz)     · POA, has history of chronic systolic CHF, not currently on diuretics  Presented with progressively worsening shortness of breath, orthopnea, PND and bilateral lower extremity pitting edema  Denies chest pain and trop delta was 7  · Per patient was taken off metoprolol by his cardiologist in the past   History of LBBB  · Last echocardiogram on file was in December of 2019 which showed EF 45-55%  Diastolic function was unable to be evaluated  Repeat echo showed ejection fraction 40%  Of note his heart rate was in the low 100s wears his echocardiogram in 2019 had a heart rate in the 70s  Under clear if this is the cause for the decreased ejection fraction    Cardiology consult appreciated  · Transition to oral Lasix today, diuresing well daily weights, I's and O's, 2 g salt diet  · Follows outpatient with Dr Nneka Vinson

## 2022-08-28 NOTE — ASSESSMENT & PLAN NOTE
· Follows outpatient with Dr Marilou Luo    Her last CT of the chest was on 6/13/22  · Scheduled for repeat CT chest in 6 months

## 2022-08-28 NOTE — PROGRESS NOTES
Cardiology Progress Note - Zahida Arthur  74 y o  male MRN: [id number]    Unit/Bed#: S -01 Encounter: [id number]        Hospital Problems:  Principal Problem:    Acute on chronic respiratory failure with hypoxia ([facility] )  Active Problems:    Acute systolic congestive heart failure (HCC)    Acute exacerbation of chronic obstructive pulmonary disease (COPD) ([facility] )    Pulmonary nodules/lesions, multiple    Mild protein-calorie malnutrition (HCC)      Assessment:  Acute on chronic hypoxic respiratory failure related to a combination of COPD and heart failure exacerbation: diuresed very well  No dyspnea at rest   Chronic heart failure with reduced EF, mild exacerbation, still diuresing well  Nonobstructive CAD:  No angina  Nonischemic left bundle branch block  Non ischemic CMP    Recommendations:  Switch to oral lasix- continue this at discharge  Echo reviewed with family  Follow up with Dr Jorgito Hubbard at discharge  Subjective:     Patient seen and examined with family at bedside  Overnight events reviewed  Significant negative fluid balance noted  Creatinine is still stable  Clinically improved  Previously was not on diuretics  Review of Systems   Constitutional: Positive for fatigue  HENT: Negative  Respiratory: Positive for cough and wheezing  Negative for shortness of breath  Cardiovascular: Negative for chest pain  Gastrointestinal: Negative  Endocrine: Positive for polyuria  Genitourinary: Negative  Musculoskeletal: Positive for arthralgias  Neurological: Negative  Hematological: Negative  Review of ten system was conducted and was negative except for findings stated          Current Facility-Administered Medications:     acetylcysteine (MUCOMYST) 200 mg/mL inhalation solution 600 mg, 3 mL, Nebulization, TID, Ana Carrion MD, 600 mg at 08/28/22 0709    albuterol (PROVENTIL HFA,VENTOLIN HFA) inhaler 2 puff, 2 puff, Inhalation, Q4H PRN, Laila Montez, MD, 2 puff at 08/27/22 0530    aspirin chewable tablet 81 mg, 81 mg, Oral, Daily, Ulises Rodriguez MD, 81 mg at 08/28/22 0814    azithromycin (ZITHROMAX) tablet 250 mg, 250 mg, Oral, Q24H, Laila Scott MD, 250 mg at 08/27/22 1103    enoxaparin (LOVENOX) subcutaneous injection 40 mg, 40 mg, Subcutaneous, Daily, Ulises Rodriguez MD, 40 mg at 08/28/22 0814    furosemide (LASIX) injection 40 mg, 40 mg, Intravenous, BID, Ulises Rodriguez MD, 40 mg at 08/28/22 0814    guaiFENesin (MUCINEX) 12 hr tablet 1,200 mg, 1,200 mg, Oral, Q12H [facility], Laila Scott MD, 1,200 mg at 08/28/22 0814    ipratropium (ATROVENT) 0 02 % inhalation solution 0 5 mg, 0 5 mg, Nebulization, TID, Ulises Rodriguez MD, 0 5 mg at 08/28/22 0709    levalbuterol (XOPENEX) inhalation solution 1 25 mg, 1 25 mg, Nebulization, TID, Ulises Rodriguez MD, 1 25 mg at 08/28/22 0709    methylPREDNISolone sodium succinate (Solu-MEDROL) injection 40 mg, 40 mg, Intravenous, Q8H [facility], Laila Scott MD, 40 mg at 08/28/22 0553    pravastatin (PRAVACHOL) tablet 80 mg, 80 mg, Oral, Daily With Maria Elena Osborn MD, 80 mg at 08/27/22 1617     Objective:     Vitals:   Blood pressure 122/69, pulse 96, temperature 98 2 °F (36 8 °C), resp  rate 18, height 5' 2" (1 575 m), weight 53 3 kg (117 lb 8 oz), SpO2 96 %  Body mass index is 21 49 kg/m²    Orthostatic Blood Pressures    Flowsheet Row Most Recent Value   Blood Pressure 122/69 filed at 08/28/2022 1193         Systolic (79MMJ), UGQ:212 , Min:98 , FPZ:352     Diastolic (01SMT), JZN:93, Min:63, Max:69      Intake/Output Summary (Last 24 hours) at 8/28/2022 0826  Last data filed at 8/28/2022 0816  Gross per 24 hour   Intake 476 ml   Output 2775 ml   Net -2299 ml     Weight (last 2 days)     Date/Time Weight    08/27/22 0600 53 3 (117 5)    08/26/22 1315 53 5 (118)    08/26/22 1059 53 5 (118)            Physical Exam  Constitutional:       General: He is not in acute distress  HENT:      Mouth/Throat:      Mouth: Mucous membranes are dry  Eyes:      General: No scleral icterus  Cardiovascular:      Rate and Rhythm: Normal rate and regular rhythm  Heart sounds: Heart sounds are distant  No murmur heard  Pulmonary:      Breath sounds: Decreased air movement present  Wheezing present  No rhonchi  Abdominal:      Tenderness: There is no abdominal tenderness  Musculoskeletal:      Right lower leg: No edema  Left lower leg: No edema  Neurological:      Mental Status: Mental status is at baseline  Psychiatric:         Mood and Affect: Mood normal            Labs & Results:        Results from last 7 days   Lab Units 08/26/22  1230   WBC Thousand/uL 8 55   HEMOGLOBIN g/dL 12 5   HEMATOCRIT % 40 2   PLATELETS Thousands/uL 234         Results from last 7 days   Lab Units 08/27/22  0440 08/26/22  1230   POTASSIUM mmol/L 4 5 4 3   CHLORIDE mmol/L 95* 101   CO2 mmol/L 35* 32   BUN mg/dL 18 12   CREATININE mg/dL 0 87 0 72   CALCIUM mg/dL 8 9 8 5   ALK PHOS U/L  --  94   ALT U/L  --  18   AST U/L  --  14             Recent Labs     08/26/22  1230   NTBNP 1,685*          Imaging Studies:     XR chest pa & lateral    Result Date: 8/26/2022  Narrative: CHEST INDICATION:   Shortness of breath  21-year-old male  Recent CT of the chest demonstrated emphysema and several small nodules  COMPARISON:  March 27, 2022  EXAM PERFORMED/VIEWS:  XR CHEST PA & LATERAL  The frontal view was performed utilizing dual energy radiographic technique  FINDINGS: Cardiomediastinal silhouette appears unremarkable  Lungs clear and hyperinflated  No evidence of pleural effusion or pneumothorax  Diffuse demineralization of the imaged portions of the skeleton  Compression deformities of multiple mid thoracic vertebrae with resultant accentuated kyphosis  Bones otherwise unremarkable  Impression: Emphysema    No evidence of acute abnormality in the chest  Workstation performed: [id number]     Echo complete    Result Date: 8/26/2022  Narrative:   Left Ventricle: Left ventricular cavity size is normal  Wall thickness is mildly increased  The left ventricular ejection fraction is 40%  Systolic function is moderately reduced  There is moderate global hypokinesis with regional variation  Diastolic function is abnormal    Right Ventricle: Right ventricular cavity size is mildly dilated  Systolic function is normal    Mitral Valve: There is mild regurgitation    Tricuspid Valve: There is mild regurgitation  The estimated right ventricular systolic pressure is 92 98 mmHg  Available cardiology studies, imaging and lab results independently reviewed today  This note was completed in part utilizing [employer] direct voice recognition software  Grammatical errors, random word insertion, spelling mistakes, occasional wrong word or "sound-alike" substitutions and incomplete sentences may be an occasional consequence of the system secondary to software limitations, ambient noise and hardware issues  At the time of dictation, efforts were made to edit, clarify and /or correct errors  Please read the chart carefully and recognize, using context, where substitutions have occurred  If you have any questions or concerns about the context, text or information contained within the body of this dictation, please contact myself, the provider, for further clarification

## 2022-08-28 NOTE — PLAN OF CARE
Problem: Nutrition/Hydration-ADULT  Goal: Nutrient/Hydration intake appropriate for improving, restoring or maintaining nutritional needs  Description: Monitor and assess patient's nutrition/hydration status for malnutrition  Collaborate with interdisciplinary team and initiate plan and interventions as ordered  Monitor patient's weight and dietary intake as ordered or per policy  Utilize nutrition screening tool and intervene as necessary  Determine patient's food preferences and provide high-protein, high-caloric foods as appropriate       INTERVENTIONS:  - Monitor oral intake, urinary output, labs, and treatment plans  - Assess nutrition and hydration status and recommend course of action  - Evaluate amount of meals eaten  - Assist patient with eating if necessary   - Allow adequate time for meals  - Recommend/ encourage appropriate diets, oral nutritional supplements, and vitamin/mineral supplements  - Order, calculate, and assess calorie counts as needed  - Recommend, monitor, and adjust tube feedings and TPN/PPN based on assessed needs  - Assess need for intravenous fluids  - Provide specific nutrition/hydration education as appropriate  - Include patient/family/caregiver in decisions related to nutrition  Outcome: Progressing     Problem: PAIN - ADULT  Goal: Verbalizes/displays adequate comfort level or baseline comfort level  Description: Interventions:  - Encourage patient to monitor pain and request assistance  - Assess pain using appropriate pain scale  - Administer analgesics based on type and severity of pain and evaluate response  - Implement non-pharmacological measures as appropriate and evaluate response  - Consider cultural and social influences on pain and pain management  - Notify physician/advanced practitioner if interventions unsuccessful or patient reports new pain  Outcome: Progressing     Problem: INFECTION - ADULT  Goal: Absence or prevention of progression during hospitalization  Description: INTERVENTIONS:  - Assess and monitor for signs and symptoms of infection  - Monitor lab/diagnostic results  - Monitor all insertion sites, i e  indwelling lines, tubes, and drains  - Monitor endotracheal if appropriate and nasal secretions for changes in amount and color  - [place] appropriate cooling/warming therapies per order  - Administer medications as ordered  - Instruct and encourage patient and family to use good hand hygiene technique  - Identify and instruct in appropriate isolation precautions for identified infection/condition  Outcome: Progressing  Goal: Absence of fever/infection during neutropenic period  Description: INTERVENTIONS:  - Monitor WBC    Outcome: Progressing     Problem: SAFETY ADULT  Goal: Patient will remain free of falls  Description: INTERVENTIONS:  - Educate patient/family on patient safety including physical limitations  - Instruct patient to call for assistance with activity   - Consult OT/PT to assist with strengthening/mobility   - Keep Call bell within reach  - Keep bed low and locked with side rails adjusted as appropriate  - Keep care items and personal belongings within reach  - Initiate and maintain comfort rounds  - Make Fall Risk Sign visible to staff  - Offer Toileting every 2 Hours, in advance of need  - Obtain necessary fall risk management equipment  - Apply yellow socks and bracelet for high fall risk patients  - Consider moving patient to room near nurses station  Outcome: Progressing  Goal: Maintain or return to baseline ADL function  Description: INTERVENTIONS:  -  Assess patient's ability to carry out ADLs; assess patient's baseline for ADL function and identify physical deficits which impact ability to perform ADLs (bathing, care of mouth/teeth, toileting, grooming, dressing, etc )  - Assess/evaluate cause of self-care deficits   - Assess range of motion  - Assess patient's mobility; develop plan if impaired  - Assess patient's need for assistive devices and provide as appropriate  - Encourage maximum independence but intervene and supervise when necessary  - Involve family in performance of ADLs  - Assess for home care needs following discharge   - Consider OT consult to assist with ADL evaluation and planning for discharge  - Provide patient education as appropriate  Outcome: Progressing  Goal: Maintains/Returns to pre admission functional level  Description: INTERVENTIONS:  - Perform BMAT or MOVE assessment daily    - Set and communicate daily mobility goal to care team and patient/family/caregiver  - Collaborate with rehabilitation services on mobility goals if consulted  - Perform Range of Motion 3 times a day  - Reposition patient every 2 hours    - Dangle patient 3 times a day  - Stand patient 3 times a day  - Ambulate patient 3 times a day  - Out of bed to chair 3 times a day   - Out of bed for meals 3 times a day  - Out of bed for toileting  - Record patient progress and toleration of activity level   Outcome: Progressing     Problem: DISCHARGE PLANNING  Goal: Discharge to home or other facility with appropriate resources  Description: INTERVENTIONS:  - Identify barriers to discharge w/patient and caregiver  - Arrange for needed discharge resources and transportation as appropriate  - Identify discharge learning needs (meds, wound care, etc )  - Arrange for interpretive services to assist at discharge as needed  - Refer to Case Management Department for coordinating discharge planning if the patient needs post-hospital services based on physician/advanced practitioner order or complex needs related to functional status, cognitive ability, or social support system  Outcome: Progressing     Problem: Knowledge Deficit  Goal: Patient/family/caregiver demonstrates understanding of disease process, treatment plan, medications, and discharge instructions  Description: Complete learning assessment and assess knowledge base   Interventions:  - Provide teaching at level of understanding  - Provide teaching via preferred learning methods  Outcome: Progressing     Problem: Potential for Falls  Goal: Patient will remain free of falls  Description: INTERVENTIONS:  - Educate patient/family on patient safety including physical limitations  - Instruct patient to call for assistance with activity   - Consult OT/PT to assist with strengthening/mobility   - Keep Call bell within reach  - Keep bed low and locked with side rails adjusted as appropriate  - Keep care items and personal belongings within reach  - Initiate and maintain comfort rounds  - Make Fall Risk Sign visible to staff  - Obtain necessary fall risk management equipment  - Apply yellow socks and bracelet for high fall risk patients  - Consider moving patient to room near nurses station  Outcome: Progressing

## 2022-08-28 NOTE — UTILIZATION REVIEW
Initial Clinical Review    Admission: Date/Time/Statement:   Admission Orders (From admission, onward)     Ordered        08/26/22 1036  Inpatient Admission  Once                      Orders Placed This Encounter   Procedures    Inpatient Admission     Standing Status:   Standing     Number of Occurrences:   1     Order Specific Question:   Level of Care     Answer:   Med Surg [16]     Order Specific Question:   Estimated length of stay     Answer:   More than 2 Midnights     Order Specific Question:   Certification     Answer:   I certify that inpatient services are medically necessary for this patient for a duration of greater than two midnights  See H&P and MD Progress Notes for additional information about the patient's course of treatment  Initial Presentation: 74 y o  male , presented to  [facility], direct Admission from Office of Pulmonology  Admitted as Inpatient due to  Acute on Chronic Respiratory Failure with Hypoxia  Date: 08/26/2022  Reports progressively worsening SOB Associated with cough, and lower extremity edema, over the past 3 months  Patient was seen at his pulmonologist office this morning and reported with significant desaturations to 75% on his baseline 3 L nasal cannula O2 after walking from the waiting room into the office  The patient reports associated orthopnea and PND  Started on IV steroids for COPD  Exacerbation as well as IV Lasix for CHF exacerbation  Respiratory protocol, nebs q i d , Check chest x-ray  Pulmonology consult  Initial labs ordered, CBC with diff, CMP, BNP, TSH  Start on Lasix 40 mg IV b i d , daily weights, I's and O's, 2 g salt diet  Check EKG  Obtain ECHO       08/26/2022  Consult Pulmonary:  1   Acute on chronic obstructive pulmonary disease              - patient with significant wheezing and accessory muscle use on exam, reports increased cough and sputum production              - continue DuoNebs 3 times daily              - continue daily Zithromax              - continue Solu-Medrol 40 mg q 8 hours              - continue BiPAP at night with home settings 12/8              - currently on home O2 of 3 L nasal cannula              - ordered ABG, consider day time BiPAP based on results   2  Acute on chronic systolic heart failure              - patient was significant volume overload on exam, 3+ pitting edema snf up tibia, JVD and hepatic jugular reflex present              - continue with Lasix 40 mg IV b i d               - echo from 2019 with an EF of 45-55% and unable to determine diastolic function              - 8/26 Echo with EF 40%, abnormal diastolic dysfunction, right ventricular systolic pressure 40 mmHg              - chest x-ray with vascular congestion on my read              - consulted cardiology   3  Volume overload              - patient denies any changes in diet              - likely secondary to acute on chronic systolic heart failure    08/26/2022  Consult Cardio:  Acute on chronic hypoxic respiratory failure related to COPD exacerbation and some component of volume overload  Chronic heart failure with reduced EF, now with ongoing lower extremity edema, stable weight  Last echo with EF 40%  Prior EF was about 45-50%  Nonobstructive CAD:  No angina  Left bundle branch block:  Nonischemic     Recommendations:  Intravenous diuretics have been begun due to concerns of volume overload  Closely monitor renal function  Discussed leg elevation with the patient when feasible  Monitor intake and outputs and hopefully transition to oral diuretics in a couple of days  Day 2: 08/27/2022   Still remains SOB  Chest x-ray suggests emphysema with no evidence of pneumonia  continue IV steroids 40 mg t i d  Continue IV lasix  I&O  Monitor on telemetry  Continue cardio-Pulmonary monitoring          ED Triage Vitals   Temperature Pulse Respirations Blood Pressure SpO2   08/26/22 1027 08/26/22 1027 08/26/22 1027 08/26/22 1027 08/26/22 1027   98 2 °F (36 8 °C) (!) 110 18 111/75 96 %      Temp src Heart Rate Source Patient Position - Orthostatic VS BP Location FiO2 (%)   -- -- -- -- --             Pain Score       08/26/22 1100       No Pain          Wt Readings from Last 1 Encounters:   08/27/22 53 3 kg (117 lb 8 oz)     Additional Vital Signs:   Date/Time Temp Pulse Resp BP MAP (mmHg) SpO2 O2 Flow Rate (L/min) O2 Device O2 Interface Device   08/28/22 14:29:28 -- 88 18 126/76 -- 96 % -- -- --   08/28/22 07:35:28 98 2 °F (36 8 °C) 96 18 122/69 87 96 % -- -- --   08/28/22 0710 -- -- -- -- -- 97 % -- -- --   08/27/22 2322 -- -- -- -- -- -- 3 L/min Nasal cannula --   08/27/22 22:06:05 98 3 °F (36 8 °C) 84 18 108/65 79 97 % -- -- --   08/27/22 2003 -- 85 18 -- -- 96 % -- -- --   08/27/22 1929 -- 84 18 -- -- 95 % -- -- --   08/27/22 14:21:49 98 °F (36 7 °C) 83 -- 98/63 75 98 % -- -- --   08/27/22 08:11:06 98 2 °F (36 8 °C) 109 Abnormal  16 106/73 84 93 % -- -- --   08/27/22 0706 -- -- -- -- -- 99 % -- -- --   08/26/22 21:58:37 98 °F (36 7 °C) 86 16 127/67 -- 99 % -- -- --   08/26/22 2153 -- -- -- -- -- 92 % -- -- Full face mask   08/26/22 2113 -- -- -- -- -- 97 % -- -- --   08/26/22 2000 -- -- -- -- -- -- 3 L/min Nasal cannula --   08/26/22 1524 -- -- -- -- -- 95 % -- -- --   08/26/22 14:28:52 97 9 °F (36 6 °C) 108 Abnormal  18 128/74 92 95 % -- -- --   08/26/22 1315 -- 104 -- 111/75 -- -- -- -- --   08/26/22 1100 -- -- -- -- -- -- 3 L/min Nasal cannula --           Pertinent Labs/Diagnostic Test Results:   XR chest pa & lateral   Final Result by Brian Riggs MD (08/26 1526)      Emphysema    No evidence of acute abnormality in the chest         Results from last 7 days   Lab Units 08/26/22  1144   SARS-COV-2  Negative     Results from last 7 days   Lab Units 08/26/22  1230   WBC Thousand/uL 8 55   HEMOGLOBIN g/dL 12 5   HEMATOCRIT % 40 2   PLATELETS Thousands/uL 234   BANDS PCT % 3     Results from last 7 days   Lab Units 08/27/22  0440 08/26/22  1230   SODIUM mmol/L 139 137   POTASSIUM mmol/L 4 5 4 3   CHLORIDE mmol/L 95* 101   CO2 mmol/L 35* 32   ANION GAP mmol/L 9 4   BUN mg/dL 18 12   CREATININE mg/dL 0 87 0 72   EGFR ml/min/1 73sq m 90 97   CALCIUM mg/dL 8 9 8 5     Results from last 7 days   Lab Units 08/26/22  1230   AST U/L 14   ALT U/L 18   ALK PHOS U/L 94   TOTAL PROTEIN g/dL 5 9*   ALBUMIN g/dL 3 8   TOTAL BILIRUBIN mg/dL 0 64     Results from last 7 days   Lab Units 08/27/22  0440 08/26/22  1230   GLUCOSE RANDOM mg/dL 157* 137      Results from last 7 days   Lab Units 08/26/22  1630   PH ART  7 390   PCO2 ART mm Hg 57 5*   PO2 ART mm Hg 85 1   HCO3 ART mmol/L 34 0*   BASE EXC ART mmol/L 7 2   O2 CONTENT ART mL/dL 17 8   O2 HGB, ARTERIAL % 92 9*   ABG SOURCE  Radial, Left     Results from last 7 days   Lab Units 08/26/22  1846 08/26/22  1230   HS TNI 0HR ng/L  --  34   HS TNI 4HR ng/L 41  --    HSTNI D4 ng/L 7  --      Results from last 7 days   Lab Units 08/27/22  0440   TSH 3RD GENERATON uIU/mL 0 595     Results from last 7 days   Lab Units 08/26/22  1230   PROCALCITONIN ng/ml <0 05     Results from last 7 days   Lab Units 08/26/22  1230   NT-PRO BNP pg/mL 1,685*     Results from last 7 days   Lab Units 08/26/22  1144   INFLUENZA A PCR  Negative   INFLUENZA B PCR  Negative   RSV PCR  Negative     Past Medical History:   Diagnosis Date    Acute metabolic encephalopathy 4/76/2120    Arthritis     Bladder mass     Cardiomyopathy (HCC)     Chest pain     COPD (chronic obstructive pulmonary disease) (HCC)     CPAP (continuous positive airway pressure) dependence     Emphysema of lung (HCC)     Hypoxia     nocturnal    Left bundle branch block     Multiple pulmonary nodules     last assessed: 10/12/16    Pneumonia     Sleep apnea     Sleep apnea, obstructive     Smoker     Weight loss 8/29/2019     Present on Admission:   Pulmonary nodules/lesions, multiple   Acute systolic congestive heart failure ([address] )   Acute exacerbation of chronic obstructive pulmonary disease (COPD) (HCC)   Acute on chronic respiratory failure with hypoxia ([facility])      Admitting Diagnosis: Pulmonary emphysema, unspecified emphysema type ([facility] )  Age/Sex: 74 y o  male  Admission Orders:  CV diet with fld restriction 1500ml  Up as tolerated  Daily weight  / Strict I&O  BiPAP Rate 14, 12/8  FiO2 40%  Contact & Airborne Isolation    Scheduled Medications:  acetylcysteine, 3 mL, Nebulization, TID  aspirin, 81 mg, Oral, Daily  azithromycin, 250 mg, Oral, Q24H  budesonide, 0 5 mg, Nebulization, Q12H  enoxaparin, 40 mg, Subcutaneous, Daily  [START ON 8/29/2022] furosemide, 40 mg, Oral, Daily  guaiFENesin, 1,200 mg, Oral, Q12H GABE  ipratropium, 0 5 mg, Nebulization, TID  levalbuterol, 1 25 mg, Nebulization, TID  methylPREDNISolone sodium succinate, 40 mg, Intravenous, Q12H GABE  pravastatin, 80 mg, Oral, Daily With Dinner      Continuous IV Infusions:     PRN Meds:  albuterol, 2 puff, Inhalation, Q4H PRN        IP CONSULT TO NUTRITION SERVICES  IP CONSULT TO PULMONOLOGY  IP CONSULT TO CARDIOLOGY    Network Utilization Review Department  ATTENTION: Please call with any questions or concerns to [phone] and carefully listen to the prompts so that you are directed to the right person  All voicemails are confidential   Sudie Crigler all requests for admission clinical reviews, approved or denied determinations and any other requests to dedicated fax number below belonging to the campus where the patient is receiving treatment   List of dedicated fax numbers for the Facilities:  [address] DENIALS (Administrative/Medical Necessity) [phone]   [address] (Maternity/NICU/Pediatrics) [phone]   [address] [place] [address] 4341 [facility] Mackenzie Jones [phone]   501 [facility] [id number] [place] [address] Edward Ruth Perez [address] [phone]   [place] [address] [phone]

## 2022-08-28 NOTE — PROGRESS NOTES
Progress Note - Pulmonary   River Beltran  74 y o  male MRN: [id number]  Unit/Bed#: S -37 Encounter: [id number]    Assessment & Plan:  COPD with acute exacerbation  Acute on chronic systolic congestive heart failure  Chronic hypoxia and hypercapnia  Pulmonary nodules    · Patient stable on his baseline 3 L by nasal cannula  Reinforce the need for BiPAP compliance with him and his wife  He does have a machine at home and recently received a new mask and is agreeable to use  · Will decrease IV Solu-Medrol to 40 mg q 12 hours  · Continue DuoNebs & Mucomyst  · Azithromycin  · Would discharge patient on his usual regimen of Trelegy 1 puff daily with DuoNebs q 6 hours and albuterol inhaler as needed   · Diuretics per primary team/Cardiology  · Close outpatient pulmonary follow-up  · Discussed with SLIM    Subjective:   Patient feels his breathing is better and wants to go home  He is getting anxious  Objective:     Vitals: Blood pressure 122/69, pulse 96, temperature 98 2 °F (36 8 °C), resp  rate 18, height 5' 2" (1 575 m), weight 53 3 kg (117 lb 8 oz), SpO2 96 %  ,Body mass index is 21 49 kg/m²  Intake/Output Summary (Last 24 hours) at 8/28/2022 1107  Last data filed at 8/28/2022 1051  Gross per 24 hour   Intake 476 ml   Output 2725 ml   Net -2249 ml       Invasive Devices  Report    Peripheral Intravenous Line  Duration           Peripheral IV 08/26/22 Right Antecubital 1 day                Physical Exam:   General appearance: Alert and oriented, in no acute distress  Head: Normocephalic, without obvious abnormality, atraumatic  Eyes: EOMI  No discharge bilaterally  No scleral icterus     Neck: Supple, symmetrical, trachea midline  Lungs:  Diminished air entry bilaterally with expiratory wheezing  Heart: Regular rate and rhythm, S1, S2 normal, no murmur  Abdomen:  No appreciable distension or tenderness  Extremities:  Improving lower extremity edema  Skin: Warm and dry  Neurologic: No acute focal deficits are noted  Psych: agitated      Labs: I have personally reviewed pertinent lab results  Imaging and other studies: I have personally reviewed pertinent reports

## 2022-08-28 NOTE — PLAN OF CARE
Problem: Nutrition/Hydration-ADULT  Goal: Nutrient/Hydration intake appropriate for improving, restoring or maintaining nutritional needs  Description: Monitor and assess patient's nutrition/hydration status for malnutrition  Collaborate with interdisciplinary team and initiate plan and interventions as ordered  Monitor patient's weight and dietary intake as ordered or per policy  Utilize nutrition screening tool and intervene as necessary  Determine patient's food preferences and provide high-protein, high-caloric foods as appropriate       INTERVENTIONS:  - Monitor oral intake, urinary output, labs, and treatment plans  - Assess nutrition and hydration status and recommend course of action  - Evaluate amount of meals eaten  - Assist patient with eating if necessary   - Allow adequate time for meals  - Recommend/ encourage appropriate diets, oral nutritional supplements, and vitamin/mineral supplements  - Order, calculate, and assess calorie counts as needed  - Recommend, monitor, and adjust tube feedings and TPN/PPN based on assessed needs  - Assess need for intravenous fluids  - Provide specific nutrition/hydration education as appropriate  - Include patient/family/caregiver in decisions related to nutrition  Outcome: Progressing     Problem: PAIN - ADULT  Goal: Verbalizes/displays adequate comfort level or baseline comfort level  Description: Interventions:  - Encourage patient to monitor pain and request assistance  - Assess pain using appropriate pain scale  - Administer analgesics based on type and severity of pain and evaluate response  - Implement non-pharmacological measures as appropriate and evaluate response  - Consider cultural and social influences on pain and pain management  - Notify physician/advanced practitioner if interventions unsuccessful or patient reports new pain  Outcome: Progressing     Problem: INFECTION - ADULT  Goal: Absence or prevention of progression during hospitalization  Description: INTERVENTIONS:  - Assess and monitor for signs and symptoms of infection  - Monitor lab/diagnostic results  - Monitor all insertion sites, i e  indwelling lines, tubes, and drains  - Monitor endotracheal if appropriate and nasal secretions for changes in amount and color  - [place] appropriate cooling/warming therapies per order  - Administer medications as ordered  - Instruct and encourage patient and family to use good hand hygiene technique  - Identify and instruct in appropriate isolation precautions for identified infection/condition  Outcome: Progressing  Goal: Absence of fever/infection during neutropenic period  Description: INTERVENTIONS:  - Monitor WBC    Outcome: Progressing     Problem: SAFETY ADULT  Goal: Patient will remain free of falls  Description: INTERVENTIONS:  - Educate patient/family on patient safety including physical limitations  - Instruct patient to call for assistance with activity   - Consult OT/PT to assist with strengthening/mobility   - Keep Call bell within reach  - Keep bed low and locked with side rails adjusted as appropriate  - Keep care items and personal belongings within reach  - Initiate and maintain comfort rounds  - Make Fall Risk Sign visible to staff  - Offer Toileting every  Hours, in advance of need  - Initiate/Maintain alarm  - Obtain necessary fall risk management equipment:   - Apply yellow socks and bracelet for high fall risk patients  - Consider moving patient to room near nurses station  Outcome: Progressing  Goal: Maintain or return to baseline ADL function  Description: INTERVENTIONS:  -  Assess patient's ability to carry out ADLs; assess patient's baseline for ADL function and identify physical deficits which impact ability to perform ADLs (bathing, care of mouth/teeth, toileting, grooming, dressing, etc )  - Assess/evaluate cause of self-care deficits   - Assess range of motion  - Assess patient's mobility; develop plan if impaired  - Assess patient's need for assistive devices and provide as appropriate  - Encourage maximum independence but intervene and supervise when necessary  - Involve family in performance of ADLs  - Assess for home care needs following discharge   - Consider OT consult to assist with ADL evaluation and planning for discharge  - Provide patient education as appropriate  Outcome: Progressing  Goal: Maintains/Returns to pre admission functional level  Description: INTERVENTIONS:  - Perform BMAT or MOVE assessment daily    - Set and communicate daily mobility goal to care team and patient/family/caregiver  - Collaborate with rehabilitation services on mobility goals if consulted  - Perform Range of Motion  times a day  - Reposition patient every  hours    - Dangle patient  times a day  - Stand patient  times a day  - Ambulate patient  times a day  - Out of bed to chair  times a day   - Out of bed for meals  times a day  - Out of bed for toileting  - Record patient progress and toleration of activity level   Outcome: Progressing     Problem: DISCHARGE PLANNING  Goal: Discharge to home or other facility with appropriate resources  Description: INTERVENTIONS:  - Identify barriers to discharge w/patient and caregiver  - Arrange for needed discharge resources and transportation as appropriate  - Identify discharge learning needs (meds, wound care, etc )  - Arrange for interpretive services to assist at discharge as needed  - Refer to Case Management Department for coordinating discharge planning if the patient needs post-hospital services based on physician/advanced practitioner order or complex needs related to functional status, cognitive ability, or social support system  Outcome: Progressing     Problem: Knowledge Deficit  Goal: Patient/family/caregiver demonstrates understanding of disease process, treatment plan, medications, and discharge instructions  Description: Complete learning assessment and assess knowledge base    Interventions:  - Provide teaching at level of understanding  - Provide teaching via preferred learning methods  Outcome: Progressing     Problem: Potential for Falls  Goal: Patient will remain free of falls  Description: INTERVENTIONS:  - Educate patient/family on patient safety including physical limitations  - Instruct patient to call for assistance with activity   - Consult OT/PT to assist with strengthening/mobility   - Keep Call bell within reach  - Keep bed low and locked with side rails adjusted as appropriate  - Keep care items and personal belongings within reach  - Initiate and maintain comfort rounds  - Make Fall Risk Sign visible to staff  - Offer Toileting every  Hours, in advance of need  - Initiate/Maintain alarm  - Obtain necessary fall risk management equipment:   - Apply yellow socks and bracelet for high fall risk patients  - Consider moving patient to room near nurses station  Outcome: Progressing

## 2022-08-28 NOTE — ASSESSMENT & PLAN NOTE
· POA, reported with hypoxia, wheezing, respiratory distress and increased mucus production  Currently on 3 L at rest with sats 90%  Decreased to 75% with exertion in the office  · Chest x-ray suggests emphysema with no evidence of pneumonia  continue IV steroids 40 mg twice daily today  Still appears very tight so not ready for oral transition  · Per pulmonology office notes, he is on azithromycin 250mg daily chronically for pulmonary emphysema  Inpatient pulmonary support appreciated  · Procalcitonin negative, sputum culture and Gram stain pending, COVID/flu/RSV PCR negative  · Continue respiratory protocol, nebs q i d    · Was on Trelegy in the past but patient states no longer on it due to cost

## 2022-08-29 VITALS
HEIGHT: 62 IN | SYSTOLIC BLOOD PRESSURE: 113 MMHG | HEART RATE: 80 BPM | OXYGEN SATURATION: 99 % | RESPIRATION RATE: 18 BRPM | WEIGHT: 109.1 LBS | TEMPERATURE: 97.8 F | DIASTOLIC BLOOD PRESSURE: 68 MMHG | BODY MASS INDEX: 20.08 KG/M2

## 2022-08-29 PROCEDURE — 99232 SBSQ HOSP IP/OBS MODERATE 35: CPT | Performed by: INTERNAL MEDICINE

## 2022-08-29 PROCEDURE — 94640 AIRWAY INHALATION TREATMENT: CPT

## 2022-08-29 PROCEDURE — 94003 VENT MGMT INPAT SUBQ DAY: CPT

## 2022-08-29 PROCEDURE — 99239 HOSP IP/OBS DSCHRG MGMT >30: CPT | Performed by: PHYSICIAN ASSISTANT

## 2022-08-29 PROCEDURE — 94760 N-INVAS EAR/PLS OXIMETRY 1: CPT

## 2022-08-29 RX ORDER — MOMETASONE FUROATE AND FORMOTEROL FUMARATE DIHYDRATE 200; 5 UG/1; UG/1
2 AEROSOL RESPIRATORY (INHALATION) 2 TIMES DAILY
Qty: 13 G | Refills: 0 | Status: SHIPPED | OUTPATIENT
Start: 2022-08-29 | End: 2022-08-29

## 2022-08-29 RX ORDER — PREDNISONE 10 MG/1
10 TABLET ORAL DAILY
Qty: 60 TABLET | Refills: 0 | Status: SHIPPED | OUTPATIENT
Start: 2022-09-04 | End: 2022-09-14 | Stop reason: SDUPTHER

## 2022-08-29 RX ORDER — FUROSEMIDE 40 MG/1
40 TABLET ORAL DAILY
Qty: 30 TABLET | Refills: 0 | Status: SHIPPED | OUTPATIENT
Start: 2022-08-30 | End: 2022-09-14 | Stop reason: SDUPTHER

## 2022-08-29 RX ORDER — PREDNISONE 20 MG/1
40 TABLET ORAL DAILY
Status: DISCONTINUED | OUTPATIENT
Start: 2022-08-30 | End: 2022-08-29 | Stop reason: HOSPADM

## 2022-08-29 RX ORDER — BUDESONIDE AND FORMOTEROL FUMARATE DIHYDRATE 160; 4.5 UG/1; UG/1
2 AEROSOL RESPIRATORY (INHALATION) 2 TIMES DAILY
Qty: 10.2 G | Refills: 0 | Status: SHIPPED | OUTPATIENT
Start: 2022-08-29 | End: 2022-08-29

## 2022-08-29 RX ORDER — PREDNISONE 20 MG/1
40 TABLET ORAL DAILY
Qty: 10 TABLET | Refills: 0 | Status: SHIPPED | OUTPATIENT
Start: 2022-08-30 | End: 2022-09-04

## 2022-08-29 RX ORDER — AZITHROMYCIN 250 MG/1
250 TABLET, FILM COATED ORAL EVERY 24 HOURS
Qty: 30 TABLET | Refills: 0 | Status: SHIPPED | OUTPATIENT
Start: 2022-08-30 | End: 2022-09-29

## 2022-08-29 RX ORDER — GUAIFENESIN 1200 MG/1
1200 TABLET, EXTENDED RELEASE ORAL EVERY 12 HOURS SCHEDULED
Refills: 0
Start: 2022-08-29 | End: 2022-09-27

## 2022-08-29 RX ADMIN — GUAIFENESIN 1200 MG: 600 TABLET ORAL at 08:31

## 2022-08-29 RX ADMIN — IPRATROPIUM BROMIDE 0.5 MG: 0.5 SOLUTION RESPIRATORY (INHALATION) at 13:26

## 2022-08-29 RX ADMIN — BUDESONIDE 0.5 MG: 0.5 INHALANT ORAL at 07:38

## 2022-08-29 RX ADMIN — AZITHROMYCIN MONOHYDRATE 250 MG: 250 TABLET ORAL at 10:42

## 2022-08-29 RX ADMIN — PRAVASTATIN SODIUM 80 MG: 80 TABLET ORAL at 16:15

## 2022-08-29 RX ADMIN — METHYLPREDNISOLONE SODIUM SUCCINATE 40 MG: 40 INJECTION, POWDER, FOR SOLUTION INTRAMUSCULAR; INTRAVENOUS at 08:31

## 2022-08-29 RX ADMIN — ASPIRIN 81 MG CHEWABLE TABLET 81 MG: 81 TABLET CHEWABLE at 08:31

## 2022-08-29 RX ADMIN — IPRATROPIUM BROMIDE 0.5 MG: 0.5 SOLUTION RESPIRATORY (INHALATION) at 07:38

## 2022-08-29 RX ADMIN — ACETYLCYSTEINE 600 MG: 200 SOLUTION ORAL; RESPIRATORY (INHALATION) at 13:26

## 2022-08-29 RX ADMIN — FUROSEMIDE 40 MG: 40 TABLET ORAL at 08:31

## 2022-08-29 RX ADMIN — LEVALBUTEROL 1.25 MG: 1.25 SOLUTION, CONCENTRATE RESPIRATORY (INHALATION) at 13:26

## 2022-08-29 RX ADMIN — ENOXAPARIN SODIUM 40 MG: 40 INJECTION SUBCUTANEOUS at 08:31

## 2022-08-29 RX ADMIN — LEVALBUTEROL 1.25 MG: 1.25 SOLUTION, CONCENTRATE RESPIRATORY (INHALATION) at 07:38

## 2022-08-29 RX ADMIN — ACETYLCYSTEINE 600 MG: 200 SOLUTION ORAL; RESPIRATORY (INHALATION) at 07:38

## 2022-08-29 NOTE — ASSESSMENT & PLAN NOTE
Malnutrition Findings:   Adult Malnutrition type: Acute illness (in the setting of chronic illness)  Adult Degree of Malnutrition: Malnutrition of mild degree  Malnutrition Characteristics: Fat loss, Muscle loss                  360 Statement: Mild malnutrition related to catabolic illness as evidenced by lower BMI, loss of fat and muscle extremities, clavicle  Currently treated with nutrition consult, nutriiton education  BMI Findings: Body mass index is 19 95 kg/m²

## 2022-08-29 NOTE — ASSESSMENT & PLAN NOTE
· POA, sent in from his pulmonologist office after he presented with respiratory distress, diffuse wheezing increased mucus production, extremity pitting edema  · Reported with desats to 75% with exertion while on his home rate of 3 L nasal O2  · Also admits to 3 months history progressively worsening shortness of breath with associated orthopnea and PND  · At the time of admission, he was on 3 L with sats 90%  Suspected multifactorial due to COPD and CHF exacerbation  Currently on 4L  · Placed on IV steroids for COPD exacerbation as well as IV Lasix for CHF exacerbation   Now transitioned to PO

## 2022-08-29 NOTE — UTILIZATION REVIEW
Initial Clinical Review     Admission: Date/Time/Statement:       Admission Orders (From admission, onward)              Ordered          08/26/22 1036   Inpatient Admission  Once                                Orders Placed This Encounter   Procedures    Inpatient Admission       Standing Status:   Standing       Number of Occurrences:   1       Order Specific Question:   Level of Care       Answer:   Med Surg [16]       Order Specific Question:   Estimated length of stay       Answer:   More than 2 Midnights       Order Specific Question:   Certification       Answer:   I certify that inpatient services are medically necessary for this patient for a duration of greater than two midnights  See H&P and MD Progress Notes for additional information about the patient's course of treatment       Initial Presentation: 74 y o  male , presented to  [facility], direct Admission from Office of Pulmonology  Admitted as Inpatient due to  Acute on Chronic Respiratory Failure with Hypoxia        Date: 08/26/2022  Reports progressively worsening SOB Associated with cough, and lower extremity edema, over the past 3 months  Patient was seen at his pulmonologist office this morning and reported with significant desaturations to 75% on his baseline 3 L nasal cannula O2 after walking from the waiting room into the office   The patient reports associated orthopnea and PND  Started on IV steroids for COPD  Exacerbation as well as IV Lasix for CHF exacerbation  Respiratory protocol, nebs q i d , Check chest x-ray    Pulmonology consult  Initial labs ordered, CBC with diff, CMP, BNP, TSH  Start on Lasix 40 mg IV b i d , daily weights, I's and O's, 2 g salt diet  Check EKG    Obtain ECHO        08/26/2022  Consult Pulmonary:  1  Acute on chronic obstructive pulmonary disease              - patient with significant wheezing and accessory muscle use on exam, reports increased cough and sputum production              - continue DuoNebs 3 times daily              - continue daily Zithromax              - continue Solu-Medrol 40 mg q 8 hours              - continue BiPAP at night with home settings 12/8              - currently on home O2 of 3 L nasal cannula              - ordered ABG, consider day time BiPAP based on results   2  Acute on chronic systolic heart failure              - patient was significant volume overload on exam, 3+ pitting edema [facility] up tibia, JVD and hepatic jugular reflex present              - continue with Lasix 40 mg IV b i d               - [place] from 2019 with an EF of 45-55% and unable to determine diastolic function              - 7/55 Echo with EF 40%, abnormal diastolic dysfunction, right ventricular systolic pressure 40 mmHg              - chest x-ray with vascular congestion on my read              - consulted cardiology   3  Volume overload              - patient denies any changes in diet              - likely secondary to acute on chronic systolic heart failure     08/26/2022  Consult Cardio:  Acute on chronic hypoxic respiratory failure related to COPD exacerbation and some component of volume overload  Chronic heart failure with reduced EF, now with ongoing lower extremity edema, stable weight   Last echo with EF 40%   Prior EF was about 45-50%   Nonobstructive CAD:  No angina  Left bundle branch block:  Nonischemic     Recommendations:  Intravenous diuretics have been begun due to concerns of volume overload  Closely monitor renal function  Discussed leg elevation with the patient when feasible  Monitor intake and outputs and hopefully transition to oral diuretics in a couple of days       Day 2: 08/27/2022   Still remains SOB  Chest x-ray suggests emphysema with no evidence of pneumonia  continue IV steroids 40 mg t i d  Continue IV lasix  I&O  Monitor on telemetry    Continue cardio-Pulmonary monitoring                  ED Triage Vitals   Temperature Pulse Respirations Blood Pressure SpO2 08/26/22 1027 08/26/22 1027 08/26/22 1027 08/26/22 1027 08/26/22 1027   98 2 °F (36 8 °C) (!) 110 18 111/75 96 %       Temp src Heart Rate Source Patient Position - Orthostatic VS BP Location FiO2 (%)   -- -- -- -- --                   Pain Score           08/26/22 1100           No Pain                  Wt Readings from Last 1 Encounters:   08/27/22 53 3 kg (117 lb 8 oz)      Additional Vital Signs:   Date/Time Temp Pulse Resp BP MAP (mmHg) SpO2 O2 Flow Rate (L/min) O2 Device O2 Interface Device   08/28/22 14:29:28 -- 88 18 126/76 -- 96 % -- -- --   08/28/22 07:35:28 98 2 °F (36 8 °C) 96 18 122/69 87 96 % -- -- --   08/28/22 0710 -- -- -- -- -- 97 % -- -- --   08/27/22 2322 -- -- -- -- -- -- 3 L/min Nasal cannula --   08/27/22 22:06:05 98 3 °F (36 8 °C) 84 18 108/65 79 97 % -- -- --   08/27/22 2003 -- 85 18 -- -- 96 % -- -- --   08/27/22 1929 -- 84 18 -- -- 95 % -- -- --   08/27/22 14:21:49 98 °F (36 7 °C) 83 -- 98/63 75 98 % -- -- --   08/27/22 08:11:06 98 2 °F (36 8 °C) 109 Abnormal  16 106/73 84 93 % -- -- --   08/27/22 0706 -- -- -- -- -- 99 % -- -- --   08/26/22 21:58:37 98 °F (36 7 °C) 86 16 127/67 -- 99 % -- -- --   08/26/22 2153 -- -- -- -- -- 92 % -- -- Full face mask   08/26/22 2113 -- -- -- -- -- 97 % -- -- --   08/26/22 2000 -- -- -- -- -- -- 3 L/min Nasal cannula --   08/26/22 1524 -- -- -- -- -- 95 % -- -- --   08/26/22 14:28:52 97 9 °F (36 6 °C) 108 Abnormal  18 128/74 92 95 % -- -- --   08/26/22 1315 -- 104 -- 111/75 -- -- -- -- --   08/26/22 1100 -- -- -- -- -- -- 3 L/min Nasal cannula --               Pertinent Labs/Diagnostic Test Results:   XR chest pa & lateral   Final Result by Jen Isbell MD (08/26 1526)       Emphysema    No evidence of acute abnormality in the chest                Results from last 7 days   Lab Units 08/26/22  1144   SARS-COV-2   Negative           Results from last 7 days   Lab Units 08/26/22  1230   WBC Thousand/uL 8 55   HEMOGLOBIN g/dL 12 5   HEMATOCRIT % 40 2   PLATELETS Thousands/uL 234   BANDS PCT % 3            Results from last 7 days   Lab Units 08/27/22  0440 08/26/22  1230   SODIUM mmol/L 139 137   POTASSIUM mmol/L 4 5 4 3   CHLORIDE mmol/L 95* 101   CO2 mmol/L 35* 32   ANION GAP mmol/L 9 4   BUN mg/dL 18 12   CREATININE mg/dL 0 87 0 72   EGFR ml/min/1 73sq m 90 97   CALCIUM mg/dL 8 9 8 5           Results from last 7 days   Lab Units 08/26/22  1230   AST U/L 14   ALT U/L 18   ALK PHOS U/L 94   TOTAL PROTEIN g/dL 5 9*   ALBUMIN g/dL 3 8   TOTAL BILIRUBIN mg/dL 0 64            Results from last 7 days   Lab Units 08/27/22  0440 08/26/22  1230   GLUCOSE RANDOM mg/dL 157* 137           Results from last 7 days   Lab Units 08/26/22  1630   PH ART   7 390   PCO2 ART mm Hg 57 5*   PO2 ART mm Hg 85 1   HCO3 ART mmol/L 34 0*   BASE EXC ART mmol/L 7 2   O2 CONTENT ART mL/dL 17 8   O2 HGB, ARTERIAL % 92 9*   ABG SOURCE   Radial, Left            Results from last 7 days   Lab Units 08/26/22  1846 08/26/22  1230   HS TNI 0HR ng/L  --  34   HS TNI 4HR ng/L 41  --    HSTNI D4 ng/L 7  --            Results from last 7 days   Lab Units 08/27/22  0440   TSH 3RD GENERATON uIU/mL 0 595           Results from last 7 days   Lab Units 08/26/22  1230   PROCALCITONIN ng/ml <0 05           Results from last 7 days   Lab Units 08/26/22  1230   NT-PRO BNP pg/mL 1,685*           Results from last 7 days   Lab Units 08/26/22  1144   INFLUENZA A PCR   Negative   INFLUENZA B PCR   Negative   RSV PCR   Negative      Medical History        Past Medical History:   Diagnosis Date    Acute metabolic encephalopathy 0/59/5487    Arthritis      Bladder mass      Cardiomyopathy [facility])      Chest pain      COPD (chronic obstructive pulmonary disease) (HCC)      CPAP (continuous positive airway pressure) dependence      Emphysema of lung (HCC)      Hypoxia       nocturnal    Left bundle branch block      Multiple pulmonary nodules       last assessed: 10/12/16    Pneumonia      Sleep apnea      Sleep apnea, obstructive      Smoker      Weight loss 8/29/2019         Present on Admission:   Pulmonary nodules/lesions, multiple   Acute systolic congestive heart failure (HCC)   Acute exacerbation of chronic obstructive pulmonary disease (COPD) (HCC)   Acute on chronic respiratory failure with hypoxia (HCC)        Admitting Diagnosis: Pulmonary emphysema, unspecified emphysema type (HCC)  Age/Sex: 74 y o  male  Admission Orders:  CV diet with fld restriction 1500ml  Up as tolerated  Daily weight  / Strict I&O  BiPAP Rate 14, 12/8  FiO2 40%  Contact & Airborne Isolation     Scheduled Medications:  acetylcysteine, 3 mL, Nebulization, TID  aspirin, 81 mg, Oral, Daily  azithromycin, 250 mg, Oral, Q24H  budesonide, 0 5 mg, Nebulization, Q12H  enoxaparin, 40 mg, Subcutaneous, Daily  [START ON 8/29/2022] furosemide, 40 mg, Oral, Daily  guaiFENesin, 1,200 mg, Oral, Q12H GABE  ipratropium, 0 5 mg, Nebulization, TID  levalbuterol, 1 25 mg, Nebulization, TID  methylPREDNISolone sodium succinate, 40 mg, Intravenous, Q12H GABE  pravastatin, 80 mg, Oral, Daily With Dinner        Continuous IV Infusions:  PRN Meds:  albuterol, 2 puff, Inhalation, Q4H PRN           IP CONSULT TO NUTRITION SERVICES  IP CONSULT TO PULMONOLOGY  IP CONSULT TO CARDIOLOGY     Network Utilization Review Department  ATTENTION: Please call with any questions or concerns to [phone] and carefully listen to the prompts so that you are directed to the right person  All voicemails are confidential   Mina Herman all requests for admission clinical reviews, approved or denied determinations and any other requests to dedicated fax number below belonging to the campus where the patient is receiving treatment   List of dedicated fax numbers for the Facilities:  [address] DENIALS (Administrative/Medical Necessity) [phone]   [address] (Maternity/NICU/Pediatrics) [phone]   [address] [place] [address] [facility] 40 125 [facility]  [phone]   [facility] 50 [address] [address] [place] [address] [address] Perez [address] [place] [address] [phone]            Note completed by Remedios Alvarado RN 8/28/22 @5926

## 2022-08-29 NOTE — ASSESSMENT & PLAN NOTE
· Follows outpatient with Dr Tuan Paez    CT of the chest was on 6/13/22  · Scheduled for repeat CT chest in 6 months

## 2022-08-29 NOTE — DISCHARGE INSTR - AVS FIRST PAGE
Dear Abril Wynne ,     It was our pleasure to care for you here at [facility]  It is our hope that we were always able to exceed the expected standards for your care during your stay  You were hospitalized due to copd and heart failure exacerbation  You were cared for on the 4th floor by Essence Storm PA-C under the service of Denise Navarrete DO with the [facility] who covers for your primary care physician (PCP), Alana Juares DO, while you were hospitalized  If you have any questions or concerns related to this hospitalization, you may contact us at [phone]  For follow up as well as any medication refills, we recommend that you follow up with your primary care physician  A registered nurse will reach out to you by phone within a few days after your discharge to answer any additional questions that you may have after going home  However, at this time we provide for you here, the most important instructions / recommendations at discharge:     Notable Medication Adjustments -   Lasix 40 mg daily  Prednisone 40 mg daily x 5 days then resume prior dose of 10 mg daily  Trelegy 1 puff daily  DuoNeb as needed  Zithromax 250 mg daily  Testing Required after Discharge -   Family doctor to check potassium/kidney labs (bmp) 1 week  Important follow up information -   Cardiology  Family doctor  pulmonology  Other Instructions -   Low salt diet  Check your weight every day  Please review this entire after visit summary as additional general instructions including medication list, appointments, activity, diet, any pertinent wound care, and other additional recommendations from your care team that may be provided for you        Sincerely,     Essence Storm PA-C

## 2022-08-29 NOTE — RESPIRATORY THERAPY NOTE
RT Protocol Note  Hilda Lorenzo  74 y o  male MRN: [id number]  Unit/Bed#: S -01 Encounter: [id number]    Assessment    Principal Problem:    Acute on chronic respiratory failure with hypoxia ([facility] )  Active Problems:    Acute systolic congestive heart failure (HCC)    Acute exacerbation of chronic obstructive pulmonary disease (COPD) ([facility] )    Pulmonary nodules/lesions, multiple    Mild protein-calorie malnutrition (HCC)      Home Pulmonary Medications:    Home Devices/Therapy: Home O2, BiPAP/CPAP    Past Medical History:   Diagnosis Date    Acute metabolic encephalopathy 4/51/6439    Arthritis     Bladder mass     Cardiomyopathy (HCC)     Chest pain     COPD (chronic obstructive pulmonary disease) ([facility])     CPAP (continuous positive airway pressure) dependence     Emphysema of lung ([facility])     Hypoxia     nocturnal    Left bundle branch block     Multiple pulmonary nodules     last assessed: 10/12/16    Pneumonia     Sleep apnea     Sleep apnea, obstructive     Smoker     Weight loss 8/29/2019     Social History     Socioeconomic History    Marital status: /Civil Union     Spouse name: None    Number of children: None    Years of education: None    Highest education level: None   Occupational History    None   Tobacco Use    Smoking status: Former Smoker     Packs/day: 2 50     Years: 37 00     Pack years: 92 50     Types: Cigarettes     Start date: 1970     Quit date: 2012     Years since quitting: 10 6    Smokeless tobacco: Former User    Tobacco comment: 1 ppd for 37 years, 2010 down to 5 cigs a day, is around second hand smoke   Vaping Use    Vaping Use: Never used   Substance and Sexual Activity    Alcohol use: Not Currently     Comment: rarely    Drug use: No    Sexual activity: Not Currently     Partners: Female   Other Topics Concern    None   Social History Narrative    Daily coffee consumption: 8 or more cups a day         Social Determinants of Health     Financial Resource Strain: Not on file   Food Insecurity: No Food Insecurity    Worried About [employer] in the Last Year: Never true    Ran Out of Food in the Last Year: Never true   Transportation Needs: No Transportation Needs    Lack of Transportation (Medical): No    Lack of Transportation (Non-Medical): No   Physical Activity: Not on file   Stress: Not on file   Social Connections: Not on file   Intimate Partner Violence: Not on file   Housing Stability: Unknown    Unable to Pay for Housing in the Last Year: No    Number of Places Lived in the Last Year: Not on file    Unstable Housing in the Last Year: No       Subjective         Objective    Physical Exam:        Vitals:  Blood pressure 115/65, pulse 90, temperature 98 1 °F (36 7 °C), temperature source Axillary, resp  rate 18, height 5' 2" (1 575 m), weight 53 3 kg (117 lb 8 oz), SpO2 100 %  Results from last 7 days   Lab Units 08/26/22  1630   PH ART  7 390   PCO2 ART mm Hg 57 5*   PO2 ART mm Hg 85 1   HCO3 ART mmol/L 34 0*   BASE EXC ART mmol/L 7 2   O2 CONTENT ART mL/dL 17 8   O2 HGB, ARTERIAL % 92 9*   ABG SOURCE  Radial, Left   CANELO TEST  Yes       Imaging and other studies: I have personally reviewed pertinent reports  Plan       Airway Clearance Plan: Discontinue Protocol     Resp Comments: 3L NC     Continue therapy as ordered

## 2022-08-29 NOTE — PROGRESS NOTES
General Cardiology   Progress Note -  [facility]   Olga Washington  74 y o  male MRN: [id number]    Unit/Bed#: S -01 Encounter: [id number]    Assessment/ Plan:     1  Acute on chronic hypoxic resp failure: combination of COPD exacerbation and CHF; on home O2 at baseline  2  Acute congestive heart failure: mild exacerbation; responded well to IV diuresis; is euvolemic on exam today  Was started on oral lasix 40mg daily; not on diuretics prior to admission  Weight today 109 lb  Creatinine:  0 87  Okay from cardiac standpoint for discharge home today  Would continue Lasix 40 mg daily at discharge  Reviewed low-sodium diet, daily weights and signs and symptoms of volume overload with patient  Follow-up made at [facility] office on 9/14  3  COPD: uses home O2; pulmonary following for acute exacerbation  Currently getting IV steroids, nebs and antibiotics  3  Non-obstructive CAD:  On prior cardiac catheterization  4  LBBB: chronic  5  Non-ischemic cardiomyopathy: LVEF 40%  Repeat echo showing EF of 40%; consistent with range in past     Subjective: pt seen for follow up; no events overnight  Pt reports feeling much better today; feels like his breathing is close to his baseline  No chest pain or palpitations  Review of Systems   Constitutional: Negative for decreased appetite and fever  Cardiovascular: Positive for dyspnea on exertion  Negative for chest pain, leg swelling, orthopnea, palpitations and syncope  Respiratory: Positive for sputum production  Negative for cough and wheezing  Gastrointestinal: Negative for nausea and vomiting  Genitourinary: Negative for dysuria  Neurological: Negative for dizziness and light-headedness  Psychiatric/Behavioral: Negative for altered mental status  All other systems reviewed and are negative  Objective:   Vitals: Blood pressure 115/65, pulse 90, temperature 98 1 °F (36 7 °C), temperature source Axillary, resp   rate 18, height 5' 2" (1 575 m), weight 49 5 kg (109 lb 1 6 oz), SpO2 92 %  ,     Body mass index is 19 95 kg/m²  ,     Systolic (39QDG), CKN:750 , Min:100 , VLO:636     Diastolic (62ZSM), PQK:63, Min:65, Max:68      Intake/Output Summary (Last 24 hours) at 8/29/2022 1440  Last data filed at 8/29/2022 1044  Gross per 24 hour   Intake 680 ml   Output 1225 ml   Net -545 ml     Weight (last 2 days)     Date/Time Weight    08/29/22 1029 49 5 (109 1)    08/27/22 0600 53 3 (117 5)        Telemetry Review: No telemetry    Physical Exam  Vitals reviewed  Constitutional:       General: He is not in acute distress  Appearance: Normal appearance  Comments: Pt sitting up in bed in NAD; alert and cooperative with exam   HENT:      Head: Normocephalic  Mouth/Throat:      Mouth: Mucous membranes are moist    Cardiovascular:      Rate and Rhythm: Normal rate and regular rhythm  Heart sounds: S1 normal and S2 normal  No murmur heard  Pulmonary:      Breath sounds: Wheezing present  No rhonchi  Comments: On O2 via NC  Abdominal:      General: Abdomen is flat  Musculoskeletal:      Right lower leg: No edema  Left lower leg: No edema  Skin:     General: Skin is warm  Neurological:      Mental Status: He is alert and oriented to person, place, and time     Psychiatric:         Mood and Affect: Mood normal        LABORATORY RESULTS      CBC with diff:   Results from last 7 days   Lab Units 08/26/22  1230   WBC Thousand/uL 8 55   HEMOGLOBIN g/dL 12 5   HEMATOCRIT % 40 2   MCV fL 101*   PLATELETS Thousands/uL 234   MCH pg 31 4   MCHC g/dL 31 1*   RDW % 12 6   MPV fL 9 2     CMP:  Results from last 7 days   Lab Units 08/27/22  0440 08/26/22  1230   POTASSIUM mmol/L 4 5 4 3   CHLORIDE mmol/L 95* 101   CO2 mmol/L 35* 32   BUN mg/dL 18 12   CREATININE mg/dL 0 87 0 72   CALCIUM mg/dL 8 9 8 5   AST U/L  --  14   ALT U/L  --  18   ALK PHOS U/L  --  94   EGFR ml/min/1 73sq m 90 97     BMP:  Results from last 7 days   Lab Units 08/27/22 0440 22  1230   POTASSIUM mmol/L 4 5 4 3   CHLORIDE mmol/L 95* 101   CO2 mmol/L 35* 32   BUN mg/dL 18 12   CREATININE mg/dL 0 87 0 72   CALCIUM mg/dL 8 9 8 5     Lab Results   Component Value Date    NTBNP 1,685 (H) 2022      Results from last 7 days   Lab Units 22  0440   TSH 3RD GENERATON uIU/mL 0 595           Lipid Profile:   Lab Results   Component Value Date    CHOL 166 2015    CHOL 174 2015    CHOL 153 10/12/2014     Lab Results   Component Value Date    HDL 53 2021    HDL 55 2020    HDL 32 (L) 2019     Lab Results   Component Value Date    LDLCALC 75 2021    LDLCALC 64 2020    LDLCALC 75 2019     Lab Results   Component Value Date    TRIG 86 2021    TRIG 67 2020    TRIG 65 2019     Cardiac testing:   Results for orders placed during the hospital encounter of 19    Echo complete with contrast if indicated    Narrative  [address]  [address] [place] [address]  [phone]    Transthoracic Echocardiogram  2D, M-mode, Doppler, and Color Doppler    Study date:  23-Dec-2019    Patient: Nava Steiner  MR number: [id number]  Account number: [de-identified]  : 1955  Age: 60 years  Gender: Male  Status: Outpatient  Location: [address] [place] [address] [facility]  Height: 62 in  Weight: 132 7 lb  BP: 122/ 80 mmHg    Indications: Cardiomyopathy, Heart Failure    Diagnoses: I42 9 - Cardiomyopathy, unspecified, I50 9 - Heart failure, unspecified    Sonographer:  Robert Iniguez RDCS  Primary Physician:  Thierry Solis DO  Referring Physician:  Radhika Snow MD  Group:  [facility]  Interpreting Physician:  Mark Pineda MD    SUMMARY    SUMMARY:  lv in 50% range; very limitedstudy  poor penetration  no significant vaive issues  lbbb with septal bounce    LEFT VENTRICLE:  There were no regional wall motion abnormalities  VENTRICULAR SEPTUM:  There was dyssynergic motion  These changes are consistent with a conduction abnormality or paced rhythm  HISTORY: PRIOR HISTORY: Hyperlipidemia, CAD, LBBB, Cardiomyopathy, CHF, Former Smoker, COPD, GERD    PROCEDURE: The study was performed in the [address]  This was a routine study  The transthoracic approach was used  The study included complete 2D imaging, M-mode, complete spectral Doppler, and color Doppler  The  heart rate was 76 bpm, at the start of the study  Images were obtained from the parasternal, apical, subcostal, and suprasternal notch acoustic windows  Echocardiographic views were limited due to lung interference  This was a technically  difficult study  LEFT VENTRICLE: Size was normal  Ejection fraction was estimated in the range of 45 % to 55 % to be 50 %  There were no regional wall motion abnormalities  DOPPLER: The study was not technically sufficient to allow evaluation of LV  diastolic function  VENTRICULAR SEPTUM: There was dyssynergic motion  These changes are consistent with a conduction abnormality or paced rhythm  RIGHT VENTRICLE: The size was normal  Systolic function was normal  Wall thickness was normal     LEFT ATRIUM: Size was normal     RIGHT ATRIUM: Size was normal     MITRAL VALVE: Not well visualized  TRICUSPID VALVE: Not well visualized  PULMONIC VALVE: Not well visualized  PERICARDIUM: There was no pericardial effusion  The pericardium was normal in appearance  AORTA: The root exhibited normal size  SYSTEMIC VEINS: IVC: Not assessed      SYSTEM MEASUREMENT TABLES    2D  %FS: 21 44 %  Ao Diam: 3 01 cm  EDV(Teich): 114 61 ml  EF Biplane: 46 59 %  EF(Cube): 51 51 %  EF(Teich): 43 34 %  ESV(Cube): 58 22 ml  ESV(Teich): 64 94 ml  IVSd: 1 03 cm  LA Area: 15 57 cm2  LA Diam: 2 95 cm  LVEDV MOD A2C: 105 95 ml  LVEDV MOD A4C: 80 23 ml  LVEDV MOD BP: 92 89 ml  LVEF MOD A2C: 47 66 %  LVEF MOD A4C: 48 6 %  LVESV MOD A2C: 55 45 ml  LVESV MOD A4C: 41 24 ml  LVESV MOD BP: 49 61 ml  LVIDd: 4 93 cm  LVIDs: 3 88 cm  LVLd A2C: 7 46 cm  LVLd A4C: 7 59 cm  LVLs A2C: 6 12 cm  LVLs A4C: 5 57 cm  LVPWd: 0 94 cm  RA Area: 14 54 cm2  RV Diam : 3 71 cm  SI(Cube): 38 42 ml/m2  SI(Teich): 30 85 ml/m2  SV MOD A2C: 50 5 ml  SV MOD A4C: 38 99 ml  SV(Cube): 61 85 ml  SV(Teich): 49 67 ml    MM  TAPSE: 1 28 cm    PW  E': 0 06 m/s  E/E': 7 78  MV A Gaurang: 0 66 m/s  MV Dec Tompkins: 1 75 m/s2  MV DecT: 280 57 ms  MV E Gaurang: 0 49 m/s  MV E/A Ratio: 0 75    [employer] Accredited Echocardiography Laboratory    Prepared and electronically signed by    Boyd Valentin MD  Signed 23-Dec-2019 13:40:12    No results found for this or any previous visit  No results found for this or any previous visit  No valid procedures specified  No results found for this or any previous visit      Meds/Allergies   current meds:   Current Facility-Administered Medications   Medication Dose Route Frequency    acetylcysteine (MUCOMYST) 200 mg/mL inhalation solution 600 mg  3 mL Nebulization TID    albuterol (PROVENTIL HFA,VENTOLIN HFA) inhaler 2 puff  2 puff Inhalation Q4H PRN    aspirin chewable tablet 81 mg  81 mg Oral Daily    azithromycin (ZITHROMAX) tablet 250 mg  250 mg Oral Q24H    budesonide (PULMICORT) inhalation solution 0 5 mg  0 5 mg Nebulization Q12H    enoxaparin (LOVENOX) subcutaneous injection 40 mg  40 mg Subcutaneous Daily    furosemide (LASIX) tablet 40 mg  40 mg Oral Daily    guaiFENesin (MUCINEX) 12 hr tablet 1,200 mg  1,200 mg Oral Q12H GABE    ipratropium (ATROVENT) 0 02 % inhalation solution 0 5 mg  0 5 mg Nebulization TID    levalbuterol (XOPENEX) inhalation solution 1 25 mg  1 25 mg Nebulization TID    pravastatin (PRAVACHOL) tablet 80 mg  80 mg Oral Daily With Dinner    [START ON 8/30/2022] predniSONE tablet 40 mg  40 mg Oral Daily     Medications Prior to Admission   Medication    azithromycin (ZITHROMAX) 250 mg tablet    rosuvastatin (CRESTOR) 10 MG tablet    albuterol (PROVENTIL HFA,VENTOLIN HFA) 90 mcg/act inhaler    aspirin 81 mg chewable tablet    ergocalciferol (VITAMIN D2) 50,000 units    guaiFENesin (MUCINEX) 600 mg 12 hr tablet    ipratropium-albuterol (DUO-NEB) 0 5-2 5 mg/3 mL nebulizer solution    predniSONE 10 mg tablet      Assessment:  Principal Problem:    Acute on chronic respiratory failure with hypoxia (HCC)  Active Problems:    Acute systolic congestive heart failure (HCC)    Acute exacerbation of chronic obstructive pulmonary disease (COPD) (HCC)    Pulmonary nodules/lesions, multiple    Mild protein-calorie malnutrition (HCC)    Counseling / Coordination of Care  Total floor / unit time spent today 20 minutes  Greater than 50% of total time was spent with the patient and / or family counseling and / or coordination of care  ** Please Note: Dragon 360 Dictation voice to text software may have been used in the creation of this document   **

## 2022-08-29 NOTE — PROGRESS NOTES
Progress Note - Pulmonary   Dony Rodriguez  74 y o  male MRN: [id number]  Unit/Bed#: S -01 Encounter: [id number]    Assessment:  1  COPD with Acute exacerbation   - Back on baseline oxygen of 3-4 L   - Mucomyst, DuoNebs TID   - Budesonide nebs q12   - Solumedrol 40mg IV BID   - discharge on a total of 5 days 40mg Prednisone then back to home dose of 10mg daily   - Discharge on daily azithromycin 250mg   - Discharge with Trelegy 200mcg 1 puff daily - patient aware of cost of $55/month and ok with it   - Patient stable for discharge from pulmonology view, will sign off    2  Acute on Chronic HFrEF   - 8/26/22 Echo with EF 40%, abnormal diastolic function   - down 5 2L since admission   - management per primary team/cardiology    3  Pulmonary nodules   - Follows with Dr Alysia Hernández for CT chest in December 4  [facility]   - On BiPAP, home settings 12/8    Subjective:   Patient feeling well this morning  Denies any SOB, cough, fever, chills  Reports his legs are no longer swollen as they were on admission  States he feels back at baseline and is ready to go home  Objective:     Vitals: Blood pressure 115/65, pulse 90, temperature 98 1 °F (36 7 °C), temperature source Axillary, resp  rate 18, height 5' 2" (1 575 m), weight 49 5 kg (109 lb 1 6 oz), SpO2 100 %  ,Body mass index is 19 95 kg/m²        Intake/Output Summary (Last 24 hours) at 8/29/2022 1051  Last data filed at 8/29/2022 1044  Gross per 24 hour   Intake 680 ml   Output 1225 ml   Net -545 ml       Invasive Devices  Report    Peripheral Intravenous Line  Duration           Peripheral IV 08/26/22 Right Antecubital 2 days                Physical Exam: /65   Pulse 90   Temp 98 1 °F (36 7 °C) (Axillary)   Resp 18   Ht 5' 2" (1 575 m)   Wt 49 5 kg (109 lb 1 6 oz)   SpO2 100%   BMI 19 95 kg/m²   General appearance: alert and oriented, in no acute distress  Lungs: Wheezes on left  Heart: regular rate and rhythm, S1, S2 normal, no murmur, click, rub or gallop  Abdomen: soft, non-tender; bowel sounds normal; no masses,  no organomegaly  Extremities: extremities normal, warm and well-perfused; no cyanosis, clubbing, or edema     Labs: I have personally reviewed pertinent lab results  Imaging and other studies: I have personally reviewed pertinent reports

## 2022-08-29 NOTE — ASSESSMENT & PLAN NOTE
Wt Readings from Last 3 Encounters:   08/29/22 49 5 kg (109 lb 1 6 oz)   08/26/22 53 5 kg (118 lb)   05/19/22 53 6 kg (118 lb 3 2 oz)     · POA, has history of chronic systolic CHF but was not on diuretics  Presented with progressively worsening shortness of breath, orthopnea, PND and bilateral lower extremity pitting edema  · Per patient was taken off metoprolol by his cardiologist in the past   History of LBBB  · Last echocardiogram on file was in December of 2019 which showed EF 45-55%  Diastolic function was unable to be evaluated  Repeat echo showed ejection fraction 40%    Cardiology consult appreciated  · Transitioned to oral Lasix 40 mg daily to continue at HI, diuresing well daily weights, 2 g salt diet  · Follows outpatient with Dr Agueda Angelo

## 2022-08-29 NOTE — DISCHARGE SUMMARY
[facility]  1955, 74 y o  male MRN: [id number]  Unit/Bed#: S -56 Encounter: [id number]  Primary Care Provider: Yayo Gloria DO   Date and time admitted to hospital: 8/26/2022 10:18 AM    * Acute on chronic respiratory failure with hypoxia [facility])  Assessment & Plan  · POA, sent in from his pulmonologist office after he presented with respiratory distress, diffuse wheezing increased mucus production, extremity pitting edema  · Reported with desats to 75% with exertion while on his home rate of 3 L nasal O2  · Also admits to 3 months history progressively worsening shortness of breath with associated orthopnea and PND  · At the time of admission, he was on 3 L with sats 90%  Suspected multifactorial due to COPD and CHF exacerbation  Currently on 4L  · Placed on IV steroids for COPD exacerbation as well as IV Lasix for CHF exacerbation  Now transitioned to PO          Acute exacerbation of chronic obstructive pulmonary disease (COPD) ([facility] [address] )  Assessment & Plan  · POA, reported with hypoxia, wheezing, respiratory distress and increased mucus production  · Appreciate pulmonology, continue oral Prednisone at discharge and initiate Trelegy  Appreciate case management input for price checking    Acute systolic congestive heart failure (HCC)  Assessment & Plan  Wt Readings from Last 3 Encounters:   08/29/22 49 5 kg (109 lb 1 6 oz)   08/26/22 53 5 kg (118 lb)   05/19/22 53 6 kg (118 lb 3 2 oz)     · POA, has history of chronic systolic CHF but was not on diuretics  Presented with progressively worsening shortness of breath, orthopnea, PND and bilateral lower extremity pitting edema  · Per patient was taken off metoprolol by his cardiologist in the past   History of LBBB  · Last echocardiogram on file was in December of 2019 which showed EF 45-55%  Diastolic function was unable to be evaluated  Repeat echo showed ejection fraction 40%    Cardiology consult appreciated  · Transitioned to oral Lasix 40 mg daily to continue at CA, diuresing well daily weights, 2 g salt diet  · Follows outpatient with Dr Jerod Brown    Pulmonary nodules/lesions, multiple  Assessment & Plan  · Follows outpatient with Dr Hugh De La Rosa  CT of the chest was on 6/13/22  · Scheduled for repeat CT chest in 6 months    Mild protein-calorie malnutrition ([address] )  Assessment & Plan  Malnutrition Findings:   Adult Malnutrition type: Acute illness (in the setting of chronic illness)  Adult Degree of Malnutrition: Malnutrition of mild degree  Malnutrition Characteristics: Fat loss, Muscle loss                  360 Statement: Mild malnutrition related to catabolic illness as evidenced by lower BMI, loss of fat and muscle extremities, clavicle  Currently treated with nutrition consult, nutriiton education  BMI Findings: Body mass index is 19 95 kg/m²  Medical Problems             Resolved Problems  Date Reviewed: 8/29/2022   None               Discharging Physician / Practitioner: Jon Spear PA-C  PCP: Zee Fernandez DO  Admission Date:   Admission Orders (From admission, onward)     Ordered        08/26/22 1036  Inpatient Admission  Once                      Discharge Date: 08/29/22    Consultations During Hospital Stay:  · Pulmonology  · cardiology    Procedures Performed:   · 2d echo  · cxr    Significant Findings / Test Results:   · As above    Incidental Findings:   · none    Test Results Pending at Discharge (will require follow up):   · none     Outpatient Tests Requested:  · bmp    Complications:  none    Reason for Admission: shortness of breath and hypoxia    Hospital Course:   River Beltran  is a 74 y o  male patient who originally presented to the hospital on 8/26/2022 due to shortness of breath, cough, wheezing and leg swelling  He was found to be severely hypoxic  Upon admission he was seen by pulmonology in placed on IV Solu-Medrol for COPD exacerbation  Clinically he improved and was stable to transition to oral prednisone  However, there was also concern for decompensated heart failure  Repeat echocardiogram showed drop in EF to 40%  He was seen by Cardiology and was placed on IV Lasix  He was transition to oral Lasix at discharge  His oxygen saturations have stabilized and he is overall feeling much improved    Please see above list of diagnoses and related plan for additional information  Condition at Discharge: fair    Discharge Day Visit / Exam:   Subjective:  Patient feels better and would like to get home as soon as possible  Reports no leg swelling and improved breathing  He has been getting stir crazy from being stuck int he room and has been walking around  Vitals: Blood Pressure: 115/65 (08/29/22 0801)  Pulse: 90 (08/29/22 0801)  Temperature: 98 1 °F (36 7 °C) (08/28/22 2211)  Temp Source: Axillary (08/28/22 2211)  Respirations: 18 (08/29/22 0801)  Height: 5' 2" (157 5 cm) (08/26/22 1315)  Weight - Scale: 49 5 kg (109 lb 1 6 oz) (08/29/22 1029)  SpO2: 92 % (08/29/22 1328)  Exam:   Physical Exam  Vitals reviewed  Constitutional:       General: He is not in acute distress  Appearance: He is not ill-appearing, toxic-appearing or diaphoretic  Comments: Very thin frail appearing gentleman seen lying comfortably in bed   HENT:      Nose: No congestion or rhinorrhea  Eyes:      General: No scleral icterus  Right eye: No discharge  Left eye: No discharge  Conjunctiva/sclera: Conjunctivae normal    Cardiovascular:      Rate and Rhythm: Normal rate and regular rhythm  Heart sounds: No murmur heard  Pulmonary:      Effort: No respiratory distress  Breath sounds: No stridor  No wheezing or rhonchi  Comments: No dyspnea, tachypnea, cough, wheezing  Oxygen saturation 96% on 4 L  severely decreased breath sounds throughout all lung fields   Abdominal:      General: There is no distension  Tenderness:  There is no guarding  Musculoskeletal:      Right lower leg: No edema  Left lower leg: No edema  Skin:     General: Skin is warm and dry  Coloration: Skin is not jaundiced or pale  Findings: No bruising, erythema, lesion or rash  Neurological:      General: No focal deficit present  Mental Status: He is alert  Psychiatric:         Mood and Affect: Mood normal          Thought Content: Thought content normal           Discussion with Family: updated wife on phone    Discharge instructions/Information to patient and family:   See after visit summary for information provided to patient and family  Provisions for Follow-Up Care:  See after visit summary for information related to follow-up care and any pertinent home health orders  Disposition:   home    Planned Readmission:      Discharge Statement:  I spent 35 minutes discharging the patient  This time was spent on the day of discharge  I had direct contact with the patient on the day of discharge  Greater than 50% of the total time was spent examining patient, answering all patient questions, arranging and discussing plan of care with patient as well as directly providing post-discharge instructions  Additional time then spent on discharge activities  Case discussed with case management and pulmonology    Discharge Medications:  See after visit summary for reconciled discharge medications provided to patient and/or family        **Please Note: This note may have been constructed using a voice recognition system**

## 2022-08-29 NOTE — CASE MANAGEMENT
Case Management Progress Note    Patient name Gerri Belcher  Location S /S -01 MRN [id number]  : 1955 Date 2022       LOS (days): 3  Geometric Mean LOS (GMLOS) (days):   Days to GMLOS:        OBJECTIVE:        Current admission status: Inpatient  Preferred Pharmacy:   [address] [place] [address]  Phone: [phone] Fax: [phone]    Primary Care Provider: Isabell Crowell DO    Primary Insurance: BLUE CROSS  Secondary Insurance: MEDICARE    PROGRESS NOTE:    CM consulted by Pulmonology to check cost of:  Symbicort  Nila Spangler   Scripts sent to [facility] pharmacy  CM contacted [facility]  As per pharmacy:  Symbicort - $80  Dulera - $55  Trelegy - $55    CM notified pt will d/c home on Trelegy  CM provided information from Telegy website for a coupon - provided to pt and reviewed  · Will treat prn

## 2022-08-29 NOTE — ASSESSMENT & PLAN NOTE
· POA, reported with hypoxia, wheezing, respiratory distress and increased mucus production  · Appreciate pulmonology, continue oral Prednisone at discharge and initiate Trelegy    Appreciate case management input for price checking

## 2022-08-29 NOTE — DISCHARGE INSTRUCTIONS
Take your medications as directed, and keep your follow up appointments  Adhere to a heart healthy lifestyle, maintaining a low sodium diet  Daily weight and record    If your weight increases 2-3 lbs in one day, or 5 lbs in 2 days, you are short of breath or have lower extremity swelling, please call Nurse Lanre Norwood at  [place] [address] office  at [phone]

## 2022-08-30 ENCOUNTER — TRANSITIONAL CARE MANAGEMENT (OUTPATIENT)
Dept: INTERNAL MEDICINE CLINIC | Facility: CLINIC | Age: 67
End: 2022-08-30

## 2022-08-30 NOTE — UTILIZATION REVIEW
Notification of Discharge   This is a Notification of Discharge from our facility [address]  Please be advised that this patient has been discharge from our facility  Below you will find the admission and discharge date and time including the patients disposition  UTILIZATION REVIEW CONTACT:  Tuyet Beltran  Utilization   Network Utilization Review Department  Phone: [phone] x carefully listen to the prompts  All voicemails are confidential   Email: [email or web address]     PHYSICIAN ADVISORY SERVICES:  FOR [id number] REVIEW - MEDICAL NECESSITY DENIAL  Phone: [phone]  Fax: [phone]  Email: [email or web address]     PRESENTATION DATE: 8/26/2022 10:18 AM  OBERVATION ADMISSION DATE:   INPATIENT ADMISSION DATE: 8/26/22 10:18 AM   DISCHARGE DATE: 8/29/2022  5:04 PM  DISPOSITION: Home/Self Care Home/Self Care      IMPORTANT INFORMATION:  Send all requests for admission clinical reviews, approved or denied determinations and any other requests to dedicated fax number below belonging to the campus where the patient is receiving treatment   List of dedicated fax numbers:  [address] DENIALS (Administrative/Medical Necessity) [phone]   [address] (Maternity/NICU/Pediatrics) [phone]   Lorilee Baptise [phone]   [address] [facility] [phone]   [address] [place] [address] [phone]   [address] [phone]   [facility]  [phone]   [address] [place] [address], [address]  [address] [facility] [address] [facility] [phone]

## 2022-09-02 ENCOUNTER — OFFICE VISIT (OUTPATIENT)
Dept: INTERNAL MEDICINE CLINIC | Facility: CLINIC | Age: 67
End: 2022-09-02
Payer: MEDICARE

## 2022-09-02 VITALS
OXYGEN SATURATION: 94 % | WEIGHT: 108.2 LBS | HEIGHT: 62 IN | BODY MASS INDEX: 19.91 KG/M2 | TEMPERATURE: 98 F | SYSTOLIC BLOOD PRESSURE: 110 MMHG | HEART RATE: 91 BPM | DIASTOLIC BLOOD PRESSURE: 84 MMHG

## 2022-09-02 DIAGNOSIS — J96.11 CHRONIC HYPOXEMIC RESPIRATORY FAILURE (HCC): ICD-10-CM

## 2022-09-02 DIAGNOSIS — Z23 ENCOUNTER FOR IMMUNIZATION: ICD-10-CM

## 2022-09-02 DIAGNOSIS — I50.21 ACUTE SYSTOLIC CONGESTIVE HEART FAILURE (HCC): ICD-10-CM

## 2022-09-02 DIAGNOSIS — J44.9 OSA AND COPD OVERLAP SYNDROME (HCC): Chronic | ICD-10-CM

## 2022-09-02 DIAGNOSIS — J43.9 PULMONARY EMPHYSEMA, UNSPECIFIED EMPHYSEMA TYPE (HCC): Primary | Chronic | ICD-10-CM

## 2022-09-02 DIAGNOSIS — G47.33 OSA AND COPD OVERLAP SYNDROME (HCC): Chronic | ICD-10-CM

## 2022-09-02 PROCEDURE — 99495 TRANSJ CARE MGMT MOD F2F 14D: CPT | Performed by: INTERNAL MEDICINE

## 2022-09-02 PROCEDURE — G0009 ADMIN PNEUMOCOCCAL VACCINE: HCPCS | Performed by: INTERNAL MEDICINE

## 2022-09-02 PROCEDURE — 1111F DSCHRG MED/CURRENT MED MERGE: CPT | Performed by: INTERNAL MEDICINE

## 2022-09-02 PROCEDURE — 90677 PCV20 VACCINE IM: CPT | Performed by: INTERNAL MEDICINE

## 2022-09-02 NOTE — ASSESSMENT & PLAN NOTE
-reduced LVEF to 40% on updated echo during his hospitalization  -now on furosemide 40mg daily, BMP script provided  -discussed adherence to 2g Na diet    Limit canned tomato soup and chicken noodle soup  -has lost 10pounds since his hospitalization  -has close follow up with Cardio  -previously on BB but was discontinued by Frida

## 2022-09-02 NOTE — ASSESSMENT & PLAN NOTE
-hospitalized for COPD exac    Received IV steroids with transition back to home prednisone 20mg daily  -started on azithromycin 250mg for recurrent infections  -restarted on Trelegy  -on home 3L continuous 02  -prevnar 20 due today

## 2022-09-02 NOTE — PROGRESS NOTES
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)     -reports compliant with bipap 12/8         Chronic hypoxemic respiratory failure (HCC) (Chronic)       Cardiovascular and Mediastinum    Acute systolic congestive heart failure ([facility] [address] )     -reduced LVEF to 40% on updated echo during his hospitalization  -now on furosemide 40mg daily, BMP script provided  -discussed adherence to 2g Na diet  Limit canned tomato soup and chicken noodle soup  -has lost 10pounds since his hospitalization  -has close follow up with Cardio  -previously on BB but was discontinued by Cardio           Relevant Orders    Basic metabolic panel      Other Visit Diagnoses     Encounter for immunization        Relevant Orders    Pneumococcal Conjugate Vaccine 20-valent (Pcv20) (Completed)          Subjective:      Patient ID: Leroy Miller  is a 74 y o  male  HPI  TCM Call     Date and time call was made  8/30/2022  9:52 AM    Hospital care reviewed  Records reviewed    Patient was hospitialized at  [address] [place] [address]    Date of Admission  08/26/22    Date of discharge  08/29/22    Diagnosis  Acute on chronic respiratory failure with hypoxia    Disposition  Home    Were the patients medications reviewed and updated  Yes    Current Symptoms  None    Shortness of breath severity  Mild      TCM Call     Post hospital issues  None    Should patient be enrolled in anticoag monitoring? No    Scheduled for follow up?   Yes    Did you obtain your prescribed medications  Yes    Do you need help managing your prescriptions or medications  No    Is transportation to your appointment needed  No    I have advised the patient to call PCP with any new or worsening symptoms  [address] [employer] members    Support System  None    The type of support provided  None    Do you have social support  Yes, as much as I need    Are you recieving any outpatient services  No    Are you recieving home care services  No    Are you using any community resources  No    Current waiver services  No    Have you fallen in the last 12 months  No    Interperter language line needed  No    Counseling  Patient    Counseling topics  Diagnostic results    Comments  appointment scheduled for 4/7        He was hospitalized at [facility] 8/26-8/29/22 for COPD exac and CHF exac  He had presented with BLE edema and increased respiratory distress despite home oxygen use  He was started on IV steroids and at discharge was kept on home dose of prednisone 20mg daily  He has returned to 3L with occasional increase when he is out in the heat  Sp02 is 90-91% at rest   He is also now using Trelegy and placed on azithromycin  There was for CHF exacerbatioin due to LE edema  Echo showed lowered EF to 40%  He was diuresed and discharged on furosemide 40mg daily  He lost 10pounds since his discharge      The following portions of the patient's history were reviewed and updated as appropriate: allergies, current medications, past family history, past medical history, past social history, past surgical history and problem list       Current Outpatient Medications:     albuterol (PROVENTIL HFA,VENTOLIN HFA) 90 mcg/act inhaler, TAKE 2 PUFFS BY MOUTH EVERY 6 HOURS AS NEEDED FOR WHEEZE OR FOR SHORTNESS OF BREATH, Disp: 8 5 g, Rfl: 11    aspirin 81 mg chewable tablet, Chew 1 tablet (81 mg total) daily, Disp: 30 tablet, Rfl: 0    azithromycin (ZITHROMAX) 250 mg tablet, Take 1 tablet (250 mg total) by mouth every 24 hours, Disp: 30 tablet, Rfl: 0    ergocalciferol (VITAMIN D2) 50,000 units, TAKE 1 CAPSULE (50,000 UNITS TOTAL) BY MOUTH EVERY 28 DAYS, Disp: 3 capsule, Rfl: 1    fluticasone-umeclidinium-vilanterol (Trelegy Ellipta) 200-62 5-25 MCG/INH AEPB inhaler, Inhale 1 puff daily Rinse mouth after use , Disp: 60 blister, Rfl: 0    furosemide (LASIX) 40 mg tablet, Take 1 tablet (40 mg total) by mouth daily, Disp: 30 tablet, Rfl: 0    guaiFENesin 1200 MG TB12, Take 1 tablet (1,200 mg total) by mouth every 12 (twelve) hours, Disp: , Rfl: 0    ipratropium-albuterol (DUO-NEB) 0 5-2 5 mg/3 mL nebulizer solution, Take 3 mL by nebulization 4 (four) times a day, Disp: 360 mL, Rfl: 4    [START ON 9/4/2022] predniSONE 10 mg tablet, Take 1 tablet (10 mg total) by mouth daily, Disp: 60 tablet, Rfl: 0    predniSONE 20 mg tablet, Take 2 tablets (40 mg total) by mouth daily for 5 days, Disp: 10 tablet, Rfl: 0    rosuvastatin (CRESTOR) 10 MG tablet, Take 10 mg by mouth daily, Disp: , Rfl:     Review of Systems   Constitutional: Positive for activity change, appetite change, fatigue and unexpected weight change  Negative for fever  HENT: Negative for congestion and postnasal drip  Respiratory: Positive for apnea, cough, shortness of breath and wheezing  Negative for choking  Cardiovascular: Positive for leg swelling  Negative for chest pain and palpitations  Gastrointestinal: Negative for abdominal pain  Genitourinary: Positive for frequency  Neurological: Negative for dizziness and headaches  Psychiatric/Behavioral: Positive for sleep disturbance  Negative for decreased concentration  Objective:    /84 (BP Location: Left arm, Patient Position: Sitting)   Pulse 91   Temp 98 °F (36 7 °C) (Tympanic)   Ht 5' 2" (1 575 m)   Wt 49 1 kg (108 lb 3 2 oz)   SpO2 94%   BMI 19 79 kg/m²      Physical Exam  Vitals reviewed  Constitutional:       General: He is not in acute distress  Appearance: He is not diaphoretic  HENT:      Head: Normocephalic        Nose:      Comments: Wears nasal cannula     Mouth/Throat:      Mouth: Mucous membranes are moist       Pharynx: Oropharynx is clear  No oropharyngeal exudate or posterior oropharyngeal erythema  Cardiovascular:      Rate and Rhythm: Regular rhythm  Tachycardia present  Pulses: Normal pulses  Heart sounds: Normal heart sounds  Pulmonary:      Breath sounds: No stridor  Wheezing (faint) present  Comments: Decreased BS  Musculoskeletal:      Cervical back: Neck supple  Right lower leg: Edema (1+ pitting edema) present  Left lower leg: Edema (1+ pitting edema) present  Comments: Thoracic kyphosis   Skin:     General: Skin is dry  Neurological:      Mental Status: He is alert and oriented to person, place, and time  Psychiatric:         Attention and Perception: Attention normal          Mood and Affect: Mood is anxious  Speech: Speech normal          Behavior: Behavior is cooperative

## 2022-09-11 ENCOUNTER — APPOINTMENT (OUTPATIENT)
Dept: LAB | Age: 67
End: 2022-09-11
Payer: MEDICARE

## 2022-09-11 DIAGNOSIS — I50.21 ACUTE SYSTOLIC CONGESTIVE HEART FAILURE (HCC): ICD-10-CM

## 2022-09-11 PROCEDURE — 36415 COLL VENOUS BLD VENIPUNCTURE: CPT

## 2022-09-11 PROCEDURE — 80048 BASIC METABOLIC PNL TOTAL CA: CPT

## 2022-09-12 PROBLEM — I50.22 CHRONIC HFREF (HEART FAILURE WITH REDUCED EJECTION FRACTION) (HCC): Status: ACTIVE | Noted: 2022-09-12

## 2022-09-12 LAB
ANION GAP SERPL CALCULATED.3IONS-SCNC: 0 MMOL/L (ref 4–13)
BUN SERPL-MCNC: 12 MG/DL (ref 5–25)
CALCIUM SERPL-MCNC: 8.7 MG/DL (ref 8.3–10.1)
CHLORIDE SERPL-SCNC: 101 MMOL/L (ref 96–108)
CO2 SERPL-SCNC: 34 MMOL/L (ref 21–32)
CREAT SERPL-MCNC: 0.85 MG/DL (ref 0.6–1.3)
GFR SERPL CREATININE-BSD FRML MDRD: 91 ML/MIN/1.73SQ M
GLUCOSE P FAST SERPL-MCNC: 103 MG/DL (ref 65–99)
POTASSIUM SERPL-SCNC: 3.6 MMOL/L (ref 3.5–5.3)
SODIUM SERPL-SCNC: 135 MMOL/L (ref 135–147)

## 2022-09-12 NOTE — PROGRESS NOTES
Cardiology  Heart Failure   Follow Up Office Visit Note -     Cookie Greene    74 y o    male   MRN: [id number]  [address]  [place] [address] [facility] [address] [phone]    PCP: Quentin Hassan DO  Cardiologist : will be Dr Efren Hoskins                Summary of Recommendations  Low-sodium diet, Heart failure education as below  He has a scale  Add losartan 25 mg/d  Add spironolactone 12 5 mg/d  Nonfasting BMP 2 weeks  Follow up will be scheduled with Dr Efren Hoskins 1 mo, new pt Oct 6        Impression/plan  chronic hypoxic respiratory failure multifactorial secondary to acute exacerbation COPD, acute on chronic heart failure reduced ejection fraction  Recent admission for decompensation 8/26-8/29/22  Dilated cardiomyopathy EF 40%, previously 45-50%  Chronic HFrEF  Wt Readings from Last 3 Encounters:   09/14/22 51 7 kg (114 lb)   09/02/22 49 1 kg (108 lb 3 2 oz)   08/29/22 49 5 kg (109 lb 1 6 oz)     --beta-blocker:   none  Limited by BP  --Diuretic:   Lasix 40 mg daily  New  --ACE/ARB/ARNI:  none  Add Losartan 25 mg/d  --MRA:  None  Add spironolactone 12 5 mg daily  --SLGT2I  --ICD:  The patient has declined  --2 g sodium diet, 1800 cc fluid restriction  Daily weights, call weight gain 2-3 lb in 1 day or 5 lb in 5 days  LBBB, chronic  Chronic respiratory failure on chronic O2 -3 L nasal cannula   COPD, recent admission for acute exacerbation  KEVIN treated with CPAP  BP  129/79 on loop diuretic  Hyperlipidemia  On rosuvastatin 10 mg daily  8/19/21 LDL 75 non HDL 92  Former tobacco use  Cardiac testing   Cardiac catheterization 9/2011  Normal coronary arteries   TTE 12/2019  EF 45-50%  Poor study  No significant valve issues  Limited study  Poor penetration  No wall motion abnormalities dyssynergic motion consistent with conduction abnormality   24 Holter monitor 12/19 Sinus rhythm with bundle branch block morphology   Rare isolated PACs and PVCs  There was no evidence of atrial fibrillation or atrial flutter   TTE 08/26/2022:  EF 40% with moderate global hypokinesis and regional variation, abnormal diastolic function, dilated RV with normal function, mild MR, mild TR  PASP 40 mm Hg  HPI:   Zahida Arthur is a 75 yo male with chronic hypoxic respiratory failure on chronic O2, nonobstructive CAD, chronic HFrEF, DCM- EF 45-50%, chronic left bundle branch block severe COPD  He follows with Dr Madeleine Desai  He was last seen in December 2021  He has been chronically dyspneic   Most of his dyspnea has been felt to be pulmonary related  However, given his cardiac status, a Bi V ICD was entertained  The patient declined and his cardiologist did not feel that would be of much benefit  His weight was recorded as 129 lb 12/21  He was felt to be euvolemic  He has been maintained on aspirin 81, rosuvastatin 10 mg daily      Adm 8/26-8/29/22  CC: Worsening shortness of breath, PND, lower extremity edema  Sent in by his pulmonologist for respiratory distress  On exam he had diffuse wheezing, pitting edema  Treated for acute on chronic hypoxic respiratory failure, multifactorial due to AE COPD and acute decompensated HFrEF, not on outpatient diuretics  EKG : None  Echo:  EF 40%, previously 45-50% in 2019  RSV/flu/COVID-19 negative  Followed by Cardiology, Pulmonology  Treated with IV steroids, IV diuretics, IV antibiotics  Discharge weight :  109 lb  Discharge creatinine:0 87  Discharge diuretics:  Lasix 40 mg daily  Not previously on diuretics  Also discharged on a prednisone taper, p o  antibiotics    9/14/22  Hospital follow-up he is accompanied by his wife  He presents in a wheelchair, on his oxygen at 3 L   ROS: Chronic dyspnea he tells me it is no worse than his usual   He is sitting in a tripod position    He does have some lower extremity edema  Reviewed his cardiac history   I suspect he has had significant sodium dietary indiscretion in the past  He is trying to adhere to a salt restricted diet but finding it difficult to give up salt in the foods that he likes  Wt Readings from Last 3 Encounters:   09/14/22 51 7 kg (114 lb)   09/02/22 49 1 kg (108 lb 3 2 oz)   08/29/22 49 5 kg (109 lb 1 6 oz)   On exam he has diminished breath sounds bilaterally  He does have 1+ bilateral lower extremity edema   Blood pressure today in the office 129/over 79  EKG:  Sinus tachycardia 107 beats per minute, similar to prior starting  Today, we focused on the importance of a salt restricted diet and discussed alternative food choices  Last labs after discharge 9/11/22: Creatinine 0 85 BUN 12 potassium 3 6 sodium 135  Will start losartan 25 mg daily and spironolactone 12 5 mg daily   Nonfasting BMP in 2 weeks   Follow-up with a Heart failure specialist in 4-6 weeks        I have spent 40 minutes with Patient and family today in which greater than 50% of this time was spent in counseling/coordination of care regarding Intructions for management, Patient and family education, Importance of tx compliance and Risk factor reductions  Assessment  Diagnoses and all orders for this visit:    Hospital discharge follow-up    Chronic HFrEF (heart failure with reduced ejection fraction) (HCC)  -     POCT ECG  -     losartan (COZAAR) 25 mg tablet; Take 1 tablet (25 mg total) by mouth daily  -     spironolactone (ALDACTONE) 25 mg tablet; Take 0 5 tablets (12 5 mg total) by mouth daily  -     Basic metabolic panel; Future    Chronic hypoxemic respiratory failure (HCC)    Cardiomyopathy, dilated (HCC)    Coronary artery disease involving native coronary artery of native heart without angina pectoris    Left bundle-branch block    Hypercholesterolemia    Former tobacco use    Very severe COPD with emphysema (HCC)  -     predniSONE 10 mg tablet;  Take 2 tablets (20 mg total) by mouth daily    Acute on chronic systolic (congestive) heart failure ([facility] [address] )  - furosemide (LASIX) 40 mg tablet; Take 1 tablet (40 mg total) by mouth daily        Past Medical History:   Diagnosis Date    Acute metabolic encephalopathy 6/34/1841    Arthritis     Bladder mass     Cardiomyopathy (HCC)     Chest pain     COPD [facility]chronic obstructive pulmonary disease) ([facility])     CPAP (continuous positive airway pressure) dependence     Emphysema of lung ([facility])     Hypoxia     nocturnal    Left bundle branch block     Multiple pulmonary nodules     last assessed: 10/12/16    Pneumonia     Sleep apnea     Sleep apnea, obstructive     Smoker     Weight loss 8/29/2019       Review of Systems   Constitutional: Negative for chills  Cardiovascular: Positive for leg swelling  Negative for chest pain, claudication, cyanosis, dyspnea on exertion, irregular heartbeat, near-syncope, orthopnea, palpitations, paroxysmal nocturnal dyspnea and syncope  Respiratory: Positive for shortness of breath  Negative for cough  Gastrointestinal: Negative for heartburn and nausea  Neurological: Negative for dizziness, focal weakness, headaches, light-headedness and weakness  All other systems reviewed and are negative  No Known Allergies        Current Outpatient Medications:     albuterol (PROVENTIL HFA,VENTOLIN HFA) 90 mcg/act inhaler, TAKE 2 PUFFS BY MOUTH EVERY 6 HOURS AS NEEDED FOR WHEEZE OR FOR SHORTNESS OF BREATH, Disp: 8 5 g, Rfl: 11    aspirin 81 mg chewable tablet, Chew 1 tablet (81 mg total) daily, Disp: 30 tablet, Rfl: 0    azithromycin (ZITHROMAX) 250 mg tablet, Take 1 tablet (250 mg total) by mouth every 24 hours, Disp: 30 tablet, Rfl: 0    ergocalciferol (VITAMIN D2) 50,000 units, TAKE 1 CAPSULE (50,000 UNITS TOTAL) BY MOUTH EVERY 28 DAYS, Disp: 3 capsule, Rfl: 1    fluticasone-umeclidinium-vilanterol (Trelegy Ellipta) 200-62 5-25 MCG/INH AEPB inhaler, Inhale 1 puff daily Rinse mouth after use , Disp: 60 blister, Rfl: 0    furosemide (LASIX) 40 mg tablet, Take 1 tablet (40 mg total) by mouth daily, Disp: 30 tablet, Rfl: 3    ipratropium-albuterol (DUO-NEB) 0 5-2 5 mg/3 mL nebulizer solution, Take 3 mL by nebulization 4 (four) times a day, Disp: 360 mL, Rfl: 4    losartan (COZAAR) 25 mg tablet, Take 1 tablet (25 mg total) by mouth daily, Disp: 30 tablet, Rfl: 3    predniSONE 10 mg tablet, Take 2 tablets (20 mg total) by mouth daily, Disp: , Rfl:     spironolactone (ALDACTONE) 25 mg tablet, Take 0 5 tablets (12 5 mg total) by mouth daily, Disp: 15 tablet, Rfl: 3    guaiFENesin 1200 MG TB12, Take 1 tablet (1,200 mg total) by mouth every 12 (twelve) hours, Disp: , Rfl: 0    rosuvastatin (CRESTOR) 10 MG tablet, Take 10 mg by mouth daily, Disp: , Rfl:     Social History     Socioeconomic History    Marital status: /Civil Union     Spouse name: Not on file    Number of children: Not on file    Years of education: Not on file    Highest education level: Not on file   Occupational History    Not on file   Tobacco Use    Smoking status: Former Smoker     Packs/day: 2 50     Years: 37 00     Pack years: 92 50     Types: Cigarettes     Start date: 1970     Quit date: 2012     Years since quitting: 10 7    Smokeless tobacco: Former User    Tobacco comment: 1 ppd for 37 years, 2010 down to 5 cigs a day, is around second hand smoke   Vaping Use    Vaping Use: Never used   Substance and Sexual Activity    Alcohol use: Not Currently     Comment: rarely    Drug use: No    Sexual activity: Not Currently     Partners: Female   Other Topics Concern    Not on file   Social History Narrative    Daily coffee consumption: 8 or more cups a day         Social Determinants of Health     Financial Resource Strain: Not on file   Food Insecurity: No Food Insecurity    Worried About Running Out of Food in the Last Year: Never true    Frida of Food in the Last Year: Never true   Transportation Needs: No Transportation Needs    Lack of Transportation (Medical):  No    Lack of Transportation (Non-Medical): No   Physical Activity: Not on file   Stress: Not on file   Social Connections: Not on file   Intimate Partner Violence: Not on file   Housing Stability: Unknown    Unable to Pay for Housing in the Last Year: No    Number of Places Lived in the Last Year: Not on file    Unstable Housing in the Last Year: No       Family History   Problem Relation Age of Onset    Diabetes Mother        Physical Exam  Vitals and nursing note reviewed  Constitutional:       General: He is not in acute distress  Appearance: He is ill-appearing  Comments: Ill-appearing  Arrived in a wheelchair, on O2   HENT:      Head: Normocephalic and atraumatic  Eyes:      Conjunctiva/sclera: Conjunctivae normal    Cardiovascular:      Rate and Rhythm: Normal rate and regular rhythm  Pulses: Intact distal pulses  Heart sounds: Normal heart sounds  Pulmonary:      Effort: Pulmonary effort is normal       Breath sounds: Normal breath sounds  Abdominal:      General: Bowel sounds are normal       Palpations: Abdomen is soft  Musculoskeletal:         General: Normal range of motion  Cervical back: Normal range of motion and neck supple  Right lower leg: Edema present  Left lower leg: Edema present  Skin:     General: Skin is warm and dry  Neurological:      Mental Status: He is alert and oriented to person, place, and time  Vitals: Blood pressure 129/79, pulse (!) 106, resp  rate (!) 28, height 5' 2" (1 575 m), weight 51 7 kg (114 lb), SpO2 96 %     Wt Readings from Last 3 Encounters:   09/14/22 51 7 kg (114 lb)   09/02/22 49 1 kg (108 lb 3 2 oz)   08/29/22 49 5 kg (109 lb 1 6 oz)         Labs & Results:  Lab Results   Component Value Date    WBC 8 55 08/26/2022    HGB 12 5 08/26/2022    HCT 40 2 08/26/2022     (H) 08/26/2022     08/26/2022     No results found for: BNP  No components found for: CHEM    Results for orders placed during the hospital encounter of 19    Echo complete with contrast if indicated    Narrative  [address]  [facility], [address] [facility]  [phone]    Transthoracic Echocardiogram  2D, M-mode, Doppler, and Color Doppler    Study date:  23-Dec-2019    Patient: Carlie Younger  MR number: [id number]  Account number: [de-identified]  : 1955  Age: 60 years  Gender: Male  Status: Outpatient  Location: [address] [place] [address] [facility] [place]  Height: 62 in  Weight: 132 7 lb  BP: 122/ 80 mmHg    Indications: Cardiomyopathy, Heart Failure    Diagnoses: I42 9 - Cardiomyopathy, unspecified, I50 9 - Heart failure, unspecified    Sonographer:  Rogerio Rinne, RDCS  Primary Physician:  Oscar Arellano DO  Referring Physician:  Yaniv Harris MD  Group:  [facility]  Interpreting Physician:  Clari Ly MD    SUMMARY    SUMMARY:  lv in 50% range; very limitedstudy  poor penetration  no significant vaive issues  lbbb with septal bounce    LEFT VENTRICLE:  There were no regional wall motion abnormalities  VENTRICULAR SEPTUM:  There was dyssynergic motion  These changes are consistent with a conduction abnormality or paced rhythm  HISTORY: PRIOR HISTORY: Hyperlipidemia, CAD, LBBB, Cardiomyopathy, CHF, Former Smoker, COPD, GERD    PROCEDURE: The study was performed in the [address]  This was a routine study  The transthoracic approach was used  The study included complete 2D imaging, M-mode, complete spectral Doppler, and color Doppler  The  heart rate was 76 bpm, at the start of the study  Images were obtained from the parasternal, apical, subcostal, and suprasternal notch acoustic windows  Echocardiographic views were limited due to lung interference  This was a technically  difficult study  LEFT VENTRICLE: Size was normal  Ejection fraction was estimated in the range of 45 % to 55 % to be 50 %  There were no regional wall motion abnormalities   DOPPLER: The study was not technically sufficient to allow evaluation of LV  diastolic function  VENTRICULAR SEPTUM: There was dyssynergic motion  These changes are consistent with a conduction abnormality or paced rhythm  RIGHT VENTRICLE: The size was normal  Systolic function was normal  Wall thickness was normal     LEFT ATRIUM: Size was normal     RIGHT ATRIUM: Size was normal     MITRAL VALVE: Not well visualized  TRICUSPID VALVE: Not well visualized  PULMONIC VALVE: Not well visualized  PERICARDIUM: There was no pericardial effusion  The pericardium was normal in appearance  AORTA: The root exhibited normal size  SYSTEMIC VEINS: IVC: Not assessed  SYSTEM MEASUREMENT TABLES    2D  %FS: 21 44 %  Ao Diam: 3 01 cm  EDV(Teich): 114 61 ml  EF Biplane: 46 59 %  EF(Cube): 51 51 %  EF(Teich): 43 34 %  ESV(Cube): 58 22 ml  ESV(Teich): 64 94 ml  IVSd: 1 03 cm  LA Area: 15 57 cm2  LA Diam: 2 95 cm  LVEDV MOD A2C: 105 95 ml  LVEDV MOD A4C: 80 23 ml  LVEDV MOD BP: 92 89 ml  LVEF MOD A2C: 47 66 %  LVEF MOD A4C: 48 6 %  LVESV MOD A2C: 55 45 ml  LVESV MOD A4C: 41 24 ml  LVESV MOD BP: 49 61 ml  LVIDd: 4 93 cm  LVIDs: 3 88 cm  LVLd A2C: 7 46 cm  LVLd A4C: 7 59 cm  LVLs A2C: 6 12 cm  LVLs A4C: 5 57 cm  LVPWd: 0 94 cm  RA Area: 14 54 cm2  RV Diam : 3 71 cm  SI(Cube): 38 42 ml/m2  SI(Teich): 30 85 ml/m2  SV MOD A2C: 50 5 ml  SV MOD A4C: 38 99 ml  SV(Cube): 61 85 ml  SV(Teich): 49 67 ml    MM  TAPSE: 1 28 cm    PW  E': 0 06 m/s  E/E': 7 78  MV A Gaurang: 0 66 m/s  MV Dec [place]: 1 75 m/s2  MV DecT: 280 57 ms  MV E Gaurang: 0 49 m/s  MV E/A Ratio: 0 75    [employer] Accredited Echocardiography Laboratory    Prepared and electronically signed by    Pooja Galvan MD  Signed 23-Dec-2019 13:40:12    No results found for this or any previous visit  This note was completed in part utilizing m-modal fluency direct voice recognition software     Grammatical errors, random word insertion, spelling mistakes, and incomplete sentences may be an occasional consequence of the system secondary to software limitations, ambient noise and hardware issues  At the time of dictation, efforts were made to edit, clarify and /or correct errors  Please read the chart carefully and recognize, using context, where substitutions have occurred    If you have any questions or concerns about the context, text or information contained within the body of this dictation, please contact myself, the provider, for further clarification

## 2022-09-14 ENCOUNTER — OFFICE VISIT (OUTPATIENT)
Dept: CARDIOLOGY CLINIC | Facility: CLINIC | Age: 67
End: 2022-09-14
Payer: MEDICARE

## 2022-09-14 VITALS
HEART RATE: 106 BPM | HEIGHT: 62 IN | DIASTOLIC BLOOD PRESSURE: 79 MMHG | RESPIRATION RATE: 28 BRPM | SYSTOLIC BLOOD PRESSURE: 129 MMHG | BODY MASS INDEX: 20.98 KG/M2 | WEIGHT: 114 LBS | OXYGEN SATURATION: 96 %

## 2022-09-14 DIAGNOSIS — Z87.891 FORMER TOBACCO USE: Chronic | ICD-10-CM

## 2022-09-14 DIAGNOSIS — I25.10 CORONARY ARTERY DISEASE INVOLVING NATIVE CORONARY ARTERY OF NATIVE HEART WITHOUT ANGINA PECTORIS: Chronic | ICD-10-CM

## 2022-09-14 DIAGNOSIS — E78.00 HYPERCHOLESTEROLEMIA: Chronic | ICD-10-CM

## 2022-09-14 DIAGNOSIS — J44.9 COPD, VERY SEVERE (HCC): ICD-10-CM

## 2022-09-14 DIAGNOSIS — J96.11 CHRONIC HYPOXEMIC RESPIRATORY FAILURE (HCC): Chronic | ICD-10-CM

## 2022-09-14 DIAGNOSIS — I50.22 CHRONIC HFREF (HEART FAILURE WITH REDUCED EJECTION FRACTION) (HCC): ICD-10-CM

## 2022-09-14 DIAGNOSIS — I44.7 LEFT BUNDLE-BRANCH BLOCK: Chronic | ICD-10-CM

## 2022-09-14 DIAGNOSIS — I50.23 ACUTE ON CHRONIC SYSTOLIC (CONGESTIVE) HEART FAILURE (HCC): ICD-10-CM

## 2022-09-14 DIAGNOSIS — Z09 HOSPITAL DISCHARGE FOLLOW-UP: Primary | ICD-10-CM

## 2022-09-14 DIAGNOSIS — I42.0 CARDIOMYOPATHY, DILATED (HCC): Chronic | ICD-10-CM

## 2022-09-14 PROCEDURE — 1111F DSCHRG MED/CURRENT MED MERGE: CPT | Performed by: INTERNAL MEDICINE

## 2022-09-14 PROCEDURE — 93000 ELECTROCARDIOGRAM COMPLETE: CPT | Performed by: INTERNAL MEDICINE

## 2022-09-14 PROCEDURE — 99215 OFFICE O/P EST HI 40 MIN: CPT | Performed by: INTERNAL MEDICINE

## 2022-09-14 RX ORDER — LOSARTAN POTASSIUM 25 MG/1
25 TABLET ORAL DAILY
Qty: 30 TABLET | Refills: 3 | Status: SHIPPED | OUTPATIENT
Start: 2022-09-14 | End: 2022-10-06

## 2022-09-14 RX ORDER — SPIRONOLACTONE 25 MG/1
12.5 TABLET ORAL DAILY
Qty: 15 TABLET | Refills: 3 | Status: SHIPPED | OUTPATIENT
Start: 2022-09-14 | End: 2022-10-06

## 2022-09-14 RX ORDER — PREDNISONE 10 MG/1
20 TABLET ORAL DAILY
Start: 2022-09-14 | End: 2022-09-27

## 2022-09-14 RX ORDER — FUROSEMIDE 40 MG/1
40 TABLET ORAL DAILY
Qty: 30 TABLET | Refills: 3 | Status: SHIPPED | OUTPATIENT
Start: 2022-09-14 | End: 2022-10-06

## 2022-09-14 NOTE — LETTER
September 14, 2022     Sanam Warner DO  [address]  [address], [address] [facility],6[address]    Patient: Maylin Ramachandran  YOB: 1955   Date of Visit: 9/14/2022       Dear Dr Mame Whaley: Thank you for referring Cici Foremanchagapito to me for evaluation  Below are my notes for this consultation  If you have questions, please do not hesitate to call me  I look forward to following your patient along with you  Sincerely,        DELIA Adkins        CC: MD Paty Mccall, [address]  9/14/2022 11:55 AM  Sign when Signing Visit  Cardiology  Heart Failure   Follow Up Office Visit Note -     Maylin Ramachandran    74 y o    male   MRN: [id number]  [address] [facility] [address]  [address] [facility],[address]  [place] [id number] [phone]    PCP: Sanam Warner DO  Cardiologist : will be Dr Tammy Sims                Summary of Recommendations  Low-sodium diet, Heart failure education as below  He has a scale  Add losartan 25 mg/d  Add spironolactone 12 5 mg/d  Nonfasting BMP 2 weeks  Follow up will be scheduled with Dr Tammy Sims 1 mo, new pt Oct 6        Impression/plan  chronic hypoxic respiratory failure multifactorial secondary to acute exacerbation COPD, acute on chronic heart failure reduced ejection fraction  Recent admission for decompensation 8/26-8/29/22  Dilated cardiomyopathy EF 40%, previously 45-50%  Chronic HFrEF  Wt Readings from Last 3 Encounters:   09/14/22 51 7 kg (114 lb)   09/02/22 49 1 kg (108 lb 3 2 oz)   08/29/22 49 5 kg (109 lb 1 6 oz)     --beta-blocker:   none  Limited by BP  --Diuretic:   Lasix 40 mg daily  New  --ACE/ARB/ARNI:  none  Add Losartan 25 mg/d  --MRA:  None  Add spironolactone 12 5 mg daily  --SLGT2I  --ICD:  The patient has declined  --2 g sodium diet, 1800 cc fluid restriction  Daily weights, call weight gain 2-3 lb in 1 day or 5 lb in 5 days  LBBB, chronic      Chronic respiratory failure on chronic O2 -3 L nasal cannula   COPD, recent admission for acute exacerbation  KEVIN treated with CPAP  BP  129/79 on loop diuretic  Hyperlipidemia  On rosuvastatin 10 mg daily  8/19/21 LDL 75 non HDL 92  Former tobacco use  Cardiac testing   Cardiac catheterization 9/2011  Normal coronary arteries   TTE 12/2019  EF 45-50%  Poor study  No significant valve issues  Limited study  Poor penetration  No wall motion abnormalities dyssynergic motion consistent with conduction abnormality   24 Holter monitor 12/19 Sinus rhythm with bundle branch block morphology  Rare isolated PACs and PVCs  There was no evidence of atrial fibrillation or atrial flutter   TTE 08/26/2022:  EF 40% with moderate global hypokinesis and regional variation, abnormal diastolic function, dilated RV with normal function, mild MR, mild TR  PASP 40 mm Hg  HPI:   Rodriguez Gastelum is a 75 yo male with chronic hypoxic respiratory failure on chronic O2, nonobstructive CAD, chronic HFrEF, DCM- EF 45-50%, chronic left bundle branch block severe COPD  He follows with Dr Cuate Henson  He was last seen in December 2021  He has been chronically dyspneic   Most of his dyspnea has been felt to be pulmonary related  However, given his cardiac status, a Bi V ICD was entertained  The patient declined and his cardiologist did not feel that would be of much benefit  His weight was recorded as 129 lb 12/21  He was felt to be euvolemic  He has been maintained on aspirin 81, rosuvastatin 10 mg daily      Adm 8/26-8/29/22  CC:   Worsening shortness of breath, PND, lower extremity edema  Sent in by his pulmonologist for respiratory distress  On exam he had diffuse wheezing, pitting edema  Treated for acute on chronic hypoxic respiratory failure, multifactorial due to AE COPD and acute decompensated HFrEF, not on outpatient diuretics  EKG : None  Echo:  EF 40%, previously 45-50% in 2019  RSV/flu/COVID-19 negative  Followed by Cardiology, Pulmonology  Treated with IV steroids, IV diuretics, IV antibiotics  Discharge weight :  109 lb  Discharge creatinine:0 87  Discharge diuretics:  Lasix 40 mg daily  Not previously on diuretics  Also discharged on a prednisone taper, p o  antibiotics    9/14/22  Hospital follow-up he is accompanied by his wife  He presents in a wheelchair, on his oxygen at 3 L   ROS: Chronic dyspnea he tells me it is no worse than his usual   He is sitting in a tripod position  He does have some lower extremity edema  Reviewed his cardiac history   I suspect he has had significant sodium dietary indiscretion in the past  He is trying to adhere to a salt restricted diet but finding it difficult to give up salt in the foods that he likes  Wt Readings from Last 3 Encounters:   09/14/22 51 7 kg (114 lb)   09/02/22 49 1 kg (108 lb 3 2 oz)   08/29/22 49 5 kg (109 lb 1 6 oz)   On exam he has diminished breath sounds bilaterally  He does have 1+ bilateral lower extremity edema   Blood pressure today in the office 129/over 79  EKG:  Sinus tachycardia 107 beats per minute, similar to prior starting  Today, we focused on the importance of a salt restricted diet and discussed alternative food choices  Last labs after discharge 9/11/22: Creatinine 0 85 BUN 12 potassium 3 6 sodium 135  Will start losartan 25 mg daily and spironolactone 12 5 mg daily   Nonfasting BMP in 2 weeks   Follow-up with a Heart failure specialist in 4-6 weeks        I have spent 40 minutes with Patient and family today in which greater than 50% of this time was spent in counseling/coordination of care regarding Intructions for management, Patient and family education, Importance of tx compliance and Risk factor reductions       Assessment  Diagnoses and all orders for this visit:    Hospital discharge follow-up    Chronic HFrEF (heart failure with reduced ejection fraction) ([facility] [address] )    Chronic hypoxemic respiratory failure (HCC)    Cardiomyopathy, dilated (HCC)    Coronary artery disease involving native coronary artery of native heart without angina pectoris    Left bundle-branch block    Hypercholesterolemia    Former tobacco use        Past Medical History:   Diagnosis Date    Acute metabolic encephalopathy 8/85/5324    Arthritis     Bladder mass     Cardiomyopathy ([facility] [address] )     Chest pain     COPD [facility]chronic obstructive pulmonary disease) ([facility]     CPAP (continuous positive airway pressure) dependence     Emphysema of lung ([facility])     Hypoxia     nocturnal    Left bundle branch block     Multiple pulmonary nodules     last assessed: 10/12/16    Pneumonia     Sleep apnea     Sleep apnea, obstructive     Smoker     Weight loss 8/29/2019       Review of Systems   Constitutional: Negative for chills  Cardiovascular: Positive for leg swelling  Negative for chest pain, claudication, cyanosis, dyspnea on exertion, irregular heartbeat, near-syncope, orthopnea, palpitations, paroxysmal nocturnal dyspnea and syncope  Respiratory: Positive for shortness of breath  Negative for cough  Gastrointestinal: Negative for heartburn and nausea  Neurological: Negative for dizziness, focal weakness, headaches, light-headedness and weakness  All other systems reviewed and are negative  No Known Allergies        Current Outpatient Medications:     albuterol (PROVENTIL HFA,VENTOLIN HFA) 90 mcg/act inhaler, TAKE 2 PUFFS BY MOUTH EVERY 6 HOURS AS NEEDED FOR WHEEZE OR FOR SHORTNESS OF BREATH, Disp: 8 5 g, Rfl: 11    aspirin 81 mg chewable tablet, Chew 1 tablet (81 mg total) daily, Disp: 30 tablet, Rfl: 0    azithromycin (ZITHROMAX) 250 mg tablet, Take 1 tablet (250 mg total) by mouth every 24 hours, Disp: 30 tablet, Rfl: 0    ergocalciferol (VITAMIN D2) 50,000 units, TAKE 1 CAPSULE (50,000 UNITS TOTAL) BY MOUTH EVERY 28 DAYS, Disp: 3 capsule, Rfl: 1    fluticasone-umeclidinium-vilanterol (Trelegy Ellipta) 200-62 5-25 MCG/INH AEPB inhaler, Inhale 1 puff daily Rinse mouth after use , Disp: 60 blister, Rfl: 0    furosemide (LASIX) 40 mg tablet, Take 1 tablet (40 mg total) by mouth daily, Disp: 30 tablet, Rfl: 0    guaiFENesin 1200 MG TB12, Take 1 tablet (1,200 mg total) by mouth every 12 (twelve) hours, Disp: , Rfl: 0    ipratropium-albuterol (DUO-NEB) 0 5-2 5 mg/3 mL nebulizer solution, Take 3 mL by nebulization 4 (four) times a day, Disp: 360 mL, Rfl: 4    predniSONE 10 mg tablet, Take 1 tablet (10 mg total) by mouth daily, Disp: 60 tablet, Rfl: 0    rosuvastatin (CRESTOR) 10 MG tablet, Take 10 mg by mouth daily, Disp: , Rfl:     Social History     Socioeconomic History    Marital status: /Civil Union     Spouse name: Not on file    Number of children: Not on file    Years of education: Not on file    Highest education level: Not on file   Occupational History    Not on file   Tobacco Use    Smoking status: Former Smoker     Packs/day: 2 50     Years: 37 00     Pack years: 92 50     Types: Cigarettes     Start date: 1970     Quit date: 2012     Years since quitting: 10 7    Smokeless tobacco: Former User    Tobacco comment: 1 ppd for 37 years, 2010 down to 5 cigs a day, is around second hand smoke   Vaping Use    Vaping Use: Never used   Substance and Sexual Activity    Alcohol use: Not Currently     Comment: rarely    Drug use: No    Sexual activity: Not Currently     Partners: Female   Other Topics Concern    Not on file   Social History Narrative    Daily coffee consumption: 8 or more cups a day         Social Determinants of Health     Financial Resource Strain: Not on file   Food Insecurity: No Food Insecurity    Worried About Running Out of Food in the Last Year: Never true    Frida of Food in the Last Year: Never true   Transportation Needs: No Transportation Needs    Lack of Transportation (Medical): No    Lack of Transportation (Non-Medical):  No   Physical Activity: Not on file   Stress: Not on file   Social Connections: Not on file   Intimate Partner Violence: Not on file   Housing Stability: Unknown    Unable to Pay for Housing in the Last Year: No    Number of Places Lived in the Last Year: Not on file    Unstable Housing in the Last Year: No       Family History   Problem Relation Age of Onset    Diabetes Mother        Physical Exam  Vitals and nursing note reviewed  Constitutional:       General: He is not in acute distress  Appearance: He is ill-appearing  Comments: Ill-appearing  Arrived in a wheelchair, on O2   HENT:      Head: Normocephalic and atraumatic  Eyes:      Conjunctiva/sclera: Conjunctivae normal    Cardiovascular:      Rate and Rhythm: Normal rate and regular rhythm  Pulses: Intact distal pulses  Heart sounds: Normal heart sounds  Pulmonary:      Effort: Pulmonary effort is normal       Breath sounds: Normal breath sounds  Abdominal:      General: Bowel sounds are normal       Palpations: Abdomen is soft  Musculoskeletal:         General: Normal range of motion  Cervical back: Normal range of motion and neck supple  Skin:     General: Skin is warm and dry  Neurological:      Mental Status: He is alert and oriented to person, place, and time  Vitals: There were no vitals taken for this visit     Wt Readings from Last 3 Encounters:   09/02/22 49 1 kg (108 lb 3 2 oz)   08/29/22 49 5 kg (109 lb 1 6 oz)   08/26/22 53 5 kg (118 lb)         Labs & Results:  Lab Results   Component Value Date    WBC 8 55 08/26/2022    HGB 12 5 08/26/2022    HCT 40 2 08/26/2022     (H) 08/26/2022     08/26/2022     No results found for: BNP  No components found for: CHEM    Results for orders placed during the hospital encounter of 12/23/19    Echo complete with contrast if indicated    Narrative  [facility] [address] [place] [address]  [phone]    Transthoracic Echocardiogram  2D, M-mode, Doppler, and Color Doppler    Study date:  23-Dec-2019    Patient: Jannet Moser  MR number: [id number]  Account number: [de-identified]  : 1955  Age: 60 years  Gender: Male  Status: Outpatient  Location: [address] [place] [address] [facility]  Height: 62 in  Weight: 132 7 lb  BP: 122/ 80 mmHg    Indications: Cardiomyopathy, Heart Failure    Diagnoses: I42 9 - Cardiomyopathy, unspecified, I50 9 - Heart failure, unspecified    Sonographer:  Anil John RDCS  Primary Physician:  Patricia Albarran DO  Referring Physician:  Hernandez Lauren MD  Group:  [facility]  Interpreting Physician:  Katherine Litten , MD    SUMMARY    SUMMARY:  lv in 50% range; very limitedstudy  poor penetration  no significant vaive issues  lbbb with septal bounce    LEFT VENTRICLE:  There were no regional wall motion abnormalities  VENTRICULAR SEPTUM:  There was dyssynergic motion  These changes are consistent with a conduction abnormality or paced rhythm  HISTORY: PRIOR HISTORY: Hyperlipidemia, CAD, LBBB, Cardiomyopathy, CHF, Former Smoker, COPD, GERD    PROCEDURE: The study was performed in the [address] [facility]  This was a routine study  The transthoracic approach was used  The study included complete 2D imaging, M-mode, complete spectral Doppler, and color Doppler  The  heart rate was 76 bpm, at the start of the study  Images were obtained from the parasternal, apical, subcostal, and suprasternal notch acoustic windows  Echocardiographic views were limited due to lung interference  This was a technically  difficult study  LEFT VENTRICLE: Size was normal  Ejection fraction was estimated in the range of 45 % to 55 % to be 50 %  There were no regional wall motion abnormalities  DOPPLER: The study was not technically sufficient to allow evaluation of LV  diastolic function  VENTRICULAR SEPTUM: There was dyssynergic motion  These changes are consistent with a conduction abnormality or paced rhythm      RIGHT VENTRICLE: The size was normal  Systolic function was normal  Wall thickness was normal     LEFT ATRIUM: Size was normal     RIGHT ATRIUM: Size was normal     MITRAL VALVE: Not well visualized  TRICUSPID VALVE: Not well visualized  PULMONIC VALVE: Not well visualized  PERICARDIUM: There was no pericardial effusion  The pericardium was normal in appearance  AORTA: The root exhibited normal size  SYSTEMIC VEINS: IVC: Not assessed  SYSTEM MEASUREMENT TABLES    2D  %FS: 21 44 %  Ao Diam: 3 01 cm  EDV(Teich): 114 61 ml  EF Biplane: 46 59 %  EF(Cube): 51 51 %  EF(Teich): 43 34 %  ESV(Cube): 58 22 ml  ESV(Teich): 64 94 ml  IVSd: 1 03 cm  LA Area: 15 57 cm2  LA Diam: 2 95 cm  LVEDV MOD A2C: 105 95 ml  LVEDV MOD A4C: 80 23 ml  LVEDV MOD BP: 92 89 ml  LVEF MOD A2C: 47 66 %  LVEF MOD A4C: 48 6 %  LVESV MOD A2C: 55 45 ml  LVESV MOD A4C: 41 24 ml  LVESV MOD BP: 49 61 ml  LVIDd: 4 93 cm  LVIDs: 3 88 cm  LVLd A2C: 7 46 cm  LVLd A4C: 7 59 cm  LVLs A2C: 6 12 cm  LVLs A4C: 5 57 cm  LVPWd: 0 94 cm  RA Area: 14 54 cm2  RV Diam : 3 71 cm  SI(Cube): 38 42 ml/m2  SI(Teich): 30 85 ml/m2  SV MOD A2C: 50 5 ml  SV MOD A4C: 38 99 ml  SV(Cube): 61 85 ml  SV(Teich): 49 67 ml    MM  TAPSE: 1 28 cm    PW  E': 0 06 m/s  E/E': 7 78  MV A Gaurang: 0 66 m/s  MV Dec Caribou: 1 75 m/s2  MV DecT: 280 57 ms  MV E Gaurang: 0 49 m/s  MV E/A Ratio: 0 75    [employer] Accredited Echocardiography Laboratory    Prepared and electronically signed by    Agnes Liu MD  Signed 23-Dec-2019 13:40:12    No results found for this or any previous visit  This note was completed in part utilizing [employer] fluency direct voice recognition software  Grammatical errors, random word insertion, spelling mistakes, and incomplete sentences may be an occasional consequence of the system secondary to software limitations, ambient noise and hardware issues  At the time of dictation, efforts were made to edit, clarify and /or correct errors    Please read the chart carefully and recognize, using context, where substitutions have occurred    If you have any questions or concerns about the context, text or information contained within the body of this dictation, please contact myself, the provider, for further clarification

## 2022-09-14 NOTE — PATIENT INSTRUCTIONS

## 2022-09-14 NOTE — LETTER
September 14, 2022     Peyton Gaxiola, [address] [id number]    Patient: Rosamaria Waters  YOB: 1955   Date of Visit: 9/14/2022       Dear Dr Farnk Valencia: Thank you for referring Vela Query to me for evaluation  Below are my notes for this consultation  If you have questions, please do not hesitate to call me  I look forward to following your patient along with you  Sincerely,        DELIA Willams        CC: No Recipients  DELIA Willams  9/14/2022 11:55 AM  Sign when Signing Visit  Cardiology  Heart Failure   Follow Up Office Visit Note -     Rosamaria Waters    74 y o    male   MRN: [id number]  [address]  [address] [facility],[address]  [place] [id number] [place] [id number] [phone]    PCP: Karyle Brightly, DO  Cardiologist : will be Dr Syed Dia                Summary of Recommendations  Low-sodium diet, Heart failure education as below  He has a scale  Add losartan 25 mg/d  Add spironolactone 12 5 mg/d  Nonfasting BMP 2 weeks  Follow up will be scheduled with Dr Syed Dia 1 mo, new pt Oct 6        Impression/plan  chronic hypoxic respiratory failure multifactorial secondary to acute exacerbation COPD, acute on chronic heart failure reduced ejection fraction  Recent admission for decompensation 8/26-8/29/22  Dilated cardiomyopathy EF 40%, previously 45-50%  Chronic HFrEF  Wt Readings from Last 3 Encounters:   09/14/22 51 7 kg (114 lb)   09/02/22 49 1 kg (108 lb 3 2 oz)   08/29/22 49 5 kg (109 lb 1 6 oz)     --beta-blocker:   none  Limited by BP  --Diuretic:   Lasix 40 mg daily  New  --ACE/ARB/ARNI:  none  Add Losartan 25 mg/d  --MRA:  None  Add spironolactone 12 5 mg daily  --SLGT2I  --ICD:  The patient has declined  --2 g sodium diet, 1800 cc fluid restriction  Daily weights, call weight gain 2-3 lb in 1 day or 5 lb in 5 days  LBBB, chronic      Chronic respiratory failure on chronic O2 -3 L nasal cannula   COPD, recent admission for acute exacerbation  KEVIN treated with CPAP  BP  129/79 on loop diuretic  Hyperlipidemia  On rosuvastatin 10 mg daily  8/19/21 LDL 75 non HDL 92  Former tobacco use  Cardiac testing   Cardiac catheterization 9/2011  Normal coronary arteries   TTE 12/2019  EF 45-50%  Poor study  No significant valve issues  Limited study  Poor penetration  No wall motion abnormalities dyssynergic motion consistent with conduction abnormality   24 Holter monitor 12/19 Sinus rhythm with bundle branch block morphology  Rare isolated PACs and PVCs  There was no evidence of atrial fibrillation or atrial flutter   TTE 08/26/2022:  EF 40% with moderate global hypokinesis and regional variation, abnormal diastolic function, dilated RV with normal function, mild MR, mild TR  PASP 40 mm Hg  HPI:   Pardeep Welsh is a 73 yo male with chronic hypoxic respiratory failure on chronic O2, nonobstructive CAD, chronic HFrEF, DCM- EF 45-50%, chronic left bundle branch block severe COPD  He follows with Dr Vada Brunner  He was last seen in December 2021  He has been chronically dyspneic   Most of his dyspnea has been felt to be pulmonary related  However, given his cardiac status, a Bi V ICD was entertained  The patient declined and his cardiologist did not feel that would be of much benefit  His weight was recorded as 129 lb 12/21  He was felt to be euvolemic  He has been maintained on aspirin 81, rosuvastatin 10 mg daily      Adm 8/26-8/29/22  CC:   Worsening shortness of breath, PND, lower extremity edema  Sent in by his pulmonologist for respiratory distress  On exam he had diffuse wheezing, pitting edema  Treated for acute on chronic hypoxic respiratory failure, multifactorial due to AE COPD and acute decompensated HFrEF, not on outpatient diuretics  EKG : None  Echo:  EF 40%, previously 45-50% in 2019  RSV/flu/COVID-19 negative  Followed by Cardiology, Pulmonology  Treated with IV steroids, IV diuretics, IV antibiotics  Discharge weight :  109 lb  Discharge creatinine:0 87  Discharge diuretics:  Lasix 40 mg daily  Not previously on diuretics  Also discharged on a prednisone taper, p o  antibiotics    9/14/22  Hospital follow-up he is accompanied by his wife  He presents in a wheelchair, on his oxygen at 3 L   ROS: Chronic dyspnea he tells me it is no worse than his usual   He is sitting in a tripod position  He does have some lower extremity edema  Reviewed his cardiac history   I suspect he has had significant sodium dietary indiscretion in the past  He is trying to adhere to a salt restricted diet but finding it difficult to give up salt in the foods that he likes  Wt Readings from Last 3 Encounters:   09/14/22 51 7 kg (114 lb)   09/02/22 49 1 kg (108 lb 3 2 oz)   08/29/22 49 5 kg (109 lb 1 6 oz)   On exam he has diminished breath sounds bilaterally  He does have 1+ bilateral lower extremity edema   Blood pressure today in the office 129/over 79  EKG:  Sinus tachycardia 107 beats per minute, similar to prior starting  Today, we focused on the importance of a salt restricted diet and discussed alternative food choices  Last labs after discharge 9/11/22: Creatinine 0 85 BUN 12 potassium 3 6 sodium 135  Will start losartan 25 mg daily and spironolactone 12 5 mg daily   Nonfasting BMP in 2 weeks   Follow-up with a Heart failure specialist in 4-6 weeks        I have spent 40 minutes with Patient and family today in which greater than 50% of this time was spent in counseling/coordination of care regarding Intructions for management, Patient and family education, Importance of tx compliance and Risk factor reductions       Assessment  Diagnoses and all orders for this visit:    Hospital discharge follow-up    Chronic HFrEF (heart failure with reduced ejection fraction) ([facility] )    Chronic hypoxemic respiratory failure (HCC)    Cardiomyopathy, dilated (HCC)    Coronary artery disease involving native coronary artery of native heart without angina pectoris    Left bundle-branch block    Hypercholesterolemia    Former tobacco use        Past Medical History:   Diagnosis Date    Acute metabolic encephalopathy 4/93/8856    Arthritis     Bladder mass     Cardiomyopathy ([facility] )     Chest pain     COPD [facility]ch[facility] obstructive pulmonary disease) ([facility])     CPAP (continuous positive airway pressure) dependence     Emphysema of lung ([facility])     Hypoxia     nocturnal    Left bundle branch block     Multiple pulmonary nodules     last assessed: 10/12/16    Pneumonia     Sleep apnea     Sleep apnea, obstructive     Smoker     Weight loss 8/29/2019       Review of Systems   Constitutional: Negative for chills  Cardiovascular: Positive for leg swelling  Negative for chest pain, claudication, cyanosis, dyspnea on exertion, irregular heartbeat, near-syncope, orthopnea, palpitations, paroxysmal nocturnal dyspnea and syncope  Respiratory: Positive for shortness of breath  Negative for cough  Gastrointestinal: Negative for heartburn and nausea  Neurological: Negative for dizziness, focal weakness, headaches, light-headedness and weakness  All other systems reviewed and are negative  No Known Allergies        Current Outpatient Medications:     albuterol (PROVENTIL HFA,VENTOLIN HFA) 90 mcg/act inhaler, TAKE 2 PUFFS BY MOUTH EVERY 6 HOURS AS NEEDED FOR WHEEZE OR FOR SHORTNESS OF BREATH, Disp: 8 5 g, Rfl: 11    aspirin 81 mg chewable tablet, Chew 1 tablet (81 mg total) daily, Disp: 30 tablet, Rfl: 0    azithromycin (ZITHROMAX) 250 mg tablet, Take 1 tablet (250 mg total) by mouth every 24 hours, Disp: 30 tablet, Rfl: 0    ergocalciferol (VITAMIN D2) 50,000 units, TAKE 1 CAPSULE (50,000 UNITS TOTAL) BY MOUTH EVERY 28 DAYS, Disp: 3 capsule, Rfl: 1    fluticasone-umeclidinium-vilanterol (Trelegy Ellipta) 200-62 5-25 MCG/INH AEPB inhaler, Inhale 1 puff daily Rinse mouth after use , Disp: 60 blister, Rfl: 0    furosemide (LASIX) 40 mg tablet, Take 1 tablet (40 mg total) by mouth daily, Disp: 30 tablet, Rfl: 0    guaiFENesin 1200 MG TB12, Take 1 tablet (1,200 mg total) by mouth every 12 (twelve) hours, Disp: , Rfl: 0    ipratropium-albuterol (DUO-NEB) 0 5-2 5 mg/3 mL nebulizer solution, Take 3 mL by nebulization 4 (four) times a day, Disp: 360 mL, Rfl: 4    predniSONE 10 mg tablet, Take 1 tablet (10 mg total) by mouth daily, Disp: 60 tablet, Rfl: 0    rosuvastatin (CRESTOR) 10 MG tablet, Take 10 mg by mouth daily, Disp: , Rfl:     Social History     Socioeconomic History    Marital status: /Civil Union     Spouse name: Not on file    Number of children: Not on file    Years of education: Not on file    Highest education level: Not on file   Occupational History    Not on file   Tobacco Use    Smoking status: Former Smoker     Packs/day: 2 50     Years: 37 00     Pack years: 92 50     Types: Cigarettes     Start date: 1970     Quit date: 2012     Years since quitting: 10 7    Smokeless tobacco: Former User    Tobacco comment: 1 ppd for 37 years, 2010 down to 5 cigs a day, is around second hand smoke   Vaping Use    Vaping Use: Never used   Substance and Sexual Activity    Alcohol use: Not Currently     Comment: rarely    Drug use: No    Sexual activity: Not Currently     Partners: Female   Other Topics Concern    Not on file   Social History Narrative    Daily coffee consumption: 8 or more cups a day         Social Determinants of Health     Financial Resource Strain: Not on file   Food Insecurity: No Food Insecurity    Worried About Running Out of Food in the Last Year: Never true    Frida of Food in the Last Year: Never true   Transportation Needs: No Transportation Needs    Lack of Transportation (Medical): No    Lack of Transportation (Non-Medical):  No   Physical Activity: Not on file   Stress: Not on file   Social Connections: Not on file   Intimate Partner Violence: Not on file   Housing Stability: Unknown    Unable to Pay for Housing in the Last Year: No    Number of Places Lived in the Last Year: Not on file    Unstable Housing in the Last Year: No       Family History   Problem Relation Age of Onset    Diabetes Mother        Physical Exam  Vitals and nursing note reviewed  Constitutional:       General: He is not in acute distress  Appearance: He is ill-appearing  Comments: Ill-appearing  Arrived in a wheelchair, on O2   HENT:      Head: Normocephalic and atraumatic  Eyes:      Conjunctiva/sclera: Conjunctivae normal    Cardiovascular:      Rate and Rhythm: Normal rate and regular rhythm  Pulses: Intact distal pulses  Heart sounds: Normal heart sounds  Pulmonary:      Effort: Pulmonary effort is normal       Breath sounds: Normal breath sounds  Abdominal:      General: Bowel sounds are normal       Palpations: Abdomen is soft  Musculoskeletal:         General: Normal range of motion  Cervical back: Normal range of motion and neck supple  Skin:     General: Skin is warm and dry  Neurological:      Mental Status: He is alert and oriented to person, place, and time  Vitals: There were no vitals taken for this visit     Wt Readings from Last 3 Encounters:   09/02/22 49 1 kg (108 lb 3 2 oz)   08/29/22 49 5 kg (109 lb 1 6 oz)   08/26/22 53 5 kg (118 lb)         Labs & Results:  Lab Results   Component Value Date    WBC 8 55 08/26/2022    HGB 12 5 08/26/2022    HCT 40 2 08/26/2022     (H) 08/26/2022     08/26/2022     No results found for: BNP  No components found for: CHEM    Results for orders placed during the hospital encounter of 12/23/19    Echo complete with contrast if indicated    Narrative  [facility] [address] [place] [address]  [phone]    Transthoracic Echocardiogram  2D, M-mode, Doppler, and Color Doppler    Study date:  23-Dec-2019    Patient: Luisito Mathis  MR number: [id number]  Account number: [de-identified]  : 1955  Age: 60 years  Gender: Male  Status: Outpatient  Location: [address] [place] [address] [facility] [place]  Height: 62 in  Weight: 132 7 lb  BP: 122/ 80 mmHg    Indications: Cardiomyopathy, Heart Failure    Diagnoses: I42 9 - Cardiomyopathy, unspecified, I50 9 - Heart failure, unspecified    Sonographer:  Rogerio Rinne, RDCS  Primary Physician:  Oscar Arellano DO  Referring Physician:  Yaniv Harris MD  [facility]  [facility]  Interpreting Physician:  Clari Ly MD    SUMMARY    SUMMARY:  lv in 50% range; very limitedstudy  poor penetration  no significant vaive issues  lbbb with septal bounce    LEFT VENTRICLE:  There were no regional wall motion abnormalities  VENTRICULAR SEPTUM:  There was dyssynergic motion  These changes are consistent with a conduction abnormality or paced rhythm  HISTORY: PRIOR HISTORY: Hyperlipidemia, CAD, LBBB, Cardiomyopathy, CHF, Former Smoker, COPD, GERD    PROCEDURE: The study was performed in the [address]  This was a routine study  The transthoracic approach was used  The study included complete 2D imaging, M-mode, complete spectral Doppler, and color Doppler  The  heart rate was 76 bpm, at the start of the study  Images were obtained from the parasternal, apical, subcostal, and suprasternal notch acoustic windows  Echocardiographic views were limited due to lung interference  This was a technically  difficult study  LEFT VENTRICLE: Size was normal  Ejection fraction was estimated in the range of 45 % to 55 % to be 50 %  There were no regional wall motion abnormalities  DOPPLER: The study was not technically sufficient to allow evaluation of LV  diastolic function  VENTRICULAR SEPTUM: There was dyssynergic motion  These changes are consistent with a conduction abnormality or paced rhythm      RIGHT VENTRICLE: The size was normal  Systolic function was normal  Wall thickness was normal     LEFT ATRIUM: Size was normal     RIGHT ATRIUM: Size was normal     MITRAL VALVE: Not well visualized  TRICUSPID VALVE: Not well visualized  PULMONIC VALVE: Not well visualized  PERICARDIUM: There was no pericardial effusion  The pericardium was normal in appearance  AORTA: The root exhibited normal size  SYSTEMIC VEINS: IVC: Not assessed  SYSTEM MEASUREMENT TABLES    2D  %FS: 21 44 %  Ao Diam: 3 01 cm  EDV(Teich): 114 61 ml  EF Biplane: 46 59 %  EF(Cube): 51 51 %  EF(Teich): 43 34 %  ESV(Cube): 58 22 ml  ESV(Teich): 64 94 ml  IVSd: 1 03 cm  LA Area: 15 57 cm2  LA Diam: 2 95 cm  LVEDV MOD A2C: 105 95 ml  LVEDV MOD A4C: 80 23 ml  LVEDV MOD BP: 92 89 ml  LVEF MOD A2C: 47 66 %  LVEF MOD A4C: 48 6 %  LVESV MOD A2C: 55 45 ml  LVESV MOD A4C: 41 24 ml  LVESV MOD BP: 49 61 ml  LVIDd: 4 93 cm  LVIDs: 3 88 cm  LVLd A2C: 7 46 cm  LVLd A4C: 7 59 cm  LVLs A2C: 6 12 cm  LVLs A4C: 5 57 cm  LVPWd: 0 94 cm  RA Area: 14 54 cm2  RV Diam : 3 71 cm  SI(Cube): 38 42 ml/m2  SI(Teich): 30 85 ml/m2  SV MOD A2C: 50 5 ml  SV MOD A4C: 38 99 ml  SV(Cube): 61 85 ml  SV(Teich): 49 67 ml    MM  TAPSE: 1 28 cm    PW  E': 0 06 m/s  E/E': 7 78  MV A Gaurang: 0 66 m/s  MV Dec Chambers: 1 75 m/s2  MV DecT: 280 57 ms  MV E Gaurang: 0 49 m/s  MV E/A Ratio: 0 75    [employer] Accredited Echocardiography Laboratory    Prepared and electronically signed by    Cleopatra Jennings MD  Signed 23-Dec-2019 13:40:12    No results found for this or any previous visit  This note was completed in part utilizing [employer] fluency direct voice recognition software  Grammatical errors, random word insertion, spelling mistakes, and incomplete sentences may be an occasional consequence of the system secondary to software limitations, ambient noise and hardware issues  At the time of dictation, efforts were made to edit, clarify and /or correct errors    Please read the chart carefully and recognize, using context, where substitutions have occurred    If you have any questions or concerns about the context, text or information contained within the body of this dictation, please contact myself, the provider, for further clarification

## 2022-09-27 ENCOUNTER — OFFICE VISIT (OUTPATIENT)
Dept: PULMONOLOGY | Facility: CLINIC | Age: 67
End: 2022-09-27
Payer: COMMERCIAL

## 2022-09-27 ENCOUNTER — APPOINTMENT (OUTPATIENT)
Dept: LAB | Facility: CLINIC | Age: 67
End: 2022-09-27
Payer: MEDICARE

## 2022-09-27 VITALS
BODY MASS INDEX: 19.69 KG/M2 | HEIGHT: 62 IN | HEART RATE: 111 BPM | OXYGEN SATURATION: 94 % | WEIGHT: 107 LBS | SYSTOLIC BLOOD PRESSURE: 126 MMHG | TEMPERATURE: 97.2 F | RESPIRATION RATE: 20 BRPM | DIASTOLIC BLOOD PRESSURE: 64 MMHG

## 2022-09-27 DIAGNOSIS — G47.33 OSA AND COPD OVERLAP SYNDROME (HCC): Chronic | ICD-10-CM

## 2022-09-27 DIAGNOSIS — J43.9 PULMONARY EMPHYSEMA, UNSPECIFIED EMPHYSEMA TYPE (HCC): Chronic | ICD-10-CM

## 2022-09-27 DIAGNOSIS — Z23 NEEDS FLU SHOT: ICD-10-CM

## 2022-09-27 DIAGNOSIS — I50.22 CHRONIC HFREF (HEART FAILURE WITH REDUCED EJECTION FRACTION) (HCC): ICD-10-CM

## 2022-09-27 DIAGNOSIS — J44.9 COPD, VERY SEVERE (HCC): ICD-10-CM

## 2022-09-27 DIAGNOSIS — R91.8 PULMONARY NODULES/LESIONS, MULTIPLE: ICD-10-CM

## 2022-09-27 DIAGNOSIS — J44.9 OSA AND COPD OVERLAP SYNDROME (HCC): Chronic | ICD-10-CM

## 2022-09-27 DIAGNOSIS — J96.11 CHRONIC HYPOXEMIC RESPIRATORY FAILURE (HCC): Primary | Chronic | ICD-10-CM

## 2022-09-27 PROBLEM — J44.1 ACUTE EXACERBATION OF CHRONIC OBSTRUCTIVE PULMONARY DISEASE (COPD) (HCC): Status: RESOLVED | Noted: 2022-03-27 | Resolved: 2022-09-27

## 2022-09-27 PROBLEM — J96.21 ACUTE ON CHRONIC RESPIRATORY FAILURE WITH HYPOXIA (HCC): Status: RESOLVED | Noted: 2022-03-27 | Resolved: 2022-09-27

## 2022-09-27 LAB
ANION GAP SERPL CALCULATED.3IONS-SCNC: 6 MMOL/L (ref 4–13)
BUN SERPL-MCNC: 9 MG/DL (ref 5–25)
CALCIUM SERPL-MCNC: 9.5 MG/DL (ref 8.3–10.1)
CHLORIDE SERPL-SCNC: 93 MMOL/L (ref 96–108)
CO2 SERPL-SCNC: 35 MMOL/L (ref 21–32)
CREAT SERPL-MCNC: 0.85 MG/DL (ref 0.6–1.3)
GFR SERPL CREATININE-BSD FRML MDRD: 91 ML/MIN/1.73SQ M
GLUCOSE P FAST SERPL-MCNC: 100 MG/DL (ref 65–99)
POTASSIUM SERPL-SCNC: 4.2 MMOL/L (ref 3.5–5.3)
SODIUM SERPL-SCNC: 134 MMOL/L (ref 135–147)

## 2022-09-27 PROCEDURE — 99214 OFFICE O/P EST MOD 30 MIN: CPT | Performed by: INTERNAL MEDICINE

## 2022-09-27 PROCEDURE — 90662 IIV NO PRSV INCREASED AG IM: CPT

## 2022-09-27 PROCEDURE — 80048 BASIC METABOLIC PNL TOTAL CA: CPT

## 2022-09-27 PROCEDURE — 90471 IMMUNIZATION ADMIN: CPT

## 2022-09-27 PROCEDURE — 36415 COLL VENOUS BLD VENIPUNCTURE: CPT

## 2022-09-27 RX ORDER — ROSUVASTATIN CALCIUM 10 MG/1
10 TABLET, COATED ORAL DAILY
Qty: 30 TABLET | Refills: 6 | Status: SHIPPED | OUTPATIENT
Start: 2022-09-27 | End: 2022-10-19

## 2022-09-27 RX ORDER — GUAIFENESIN 600 MG/1
1200 TABLET, EXTENDED RELEASE ORAL EVERY 12 HOURS SCHEDULED
Qty: 60 TABLET | Refills: 6 | Status: SHIPPED | OUTPATIENT
Start: 2022-09-27

## 2022-09-27 RX ORDER — PREDNISONE 1 MG/1
TABLET ORAL
Qty: 150 TABLET | Refills: 6 | Status: SHIPPED | OUTPATIENT
Start: 2022-09-27

## 2022-09-27 NOTE — ASSESSMENT & PLAN NOTE
Wt Readings from Last 3 Encounters:   09/27/22 48 5 kg (107 lb)   09/14/22 51 7 kg (114 lb)   09/02/22 49 1 kg (108 lb 3 2 oz)       He has not been taking spironolactone or losartan because he was concerned that he should only be following what Dr Crissy Gonzalez recommended  I have sent Dr Crissy Gonzalez a message to reach out to the patient to talk about medications as well as follow-up appointment

## 2022-09-27 NOTE — PROGRESS NOTES
Assessment:    Chronic hypoxemic respiratory failure (HCC)  He will continue on 3 L nasal cannula oxygen  Goal is always to remain greater than 88%  Chronic obstructive pulmonary disease (HCC)  In the hospital his wife states he was able to decrease the prednisone to 10 mg daily  With goal of trying to wean prednisone to the lowest dose possible, we will decrease to 15 mg daily  If he has increased symptoms he will go back to 20 mg daily  He will maintain on Trelegy once daily with DuoNeb 3-4 times daily  He is agreeable to get flu shot today  KEVIN and COPD overlap syndrome ([facility] )  He will continue on BiPAP 12/8 on a nightly basis  Chronic HFrEF (heart failure with reduced ejection fraction) ([facility])  Wt Readings from Last 3 Encounters:   09/27/22 48 5 kg (107 lb)   09/14/22 51 7 kg (114 lb)   09/02/22 49 1 kg (108 lb 3 2 oz)       He has not been taking spironolactone or losartan because he was concerned that he should only be following what Dr Reece Castellon recommended  I have sent Dr eRece Castellon a message to reach out to the patient to talk about medications as well as follow-up appointment  Plan:    Diagnoses and all orders for this visit:    Chronic hypoxemic respiratory failure ([facility] )    Pulmonary emphysema, unspecified emphysema type ([facility])  -     guaiFENesin (MUCINEX) 600 mg 12 hr tablet; Take 2 tablets (1,200 mg total) by mouth every 12 (twelve) hours  -     rosuvastatin (CRESTOR) 10 MG tablet; Take 1 tablet (10 mg total) by mouth daily  -     predniSONE 5 mg tablet;  Take 3-4 tablets daily  -     influenza vaccine, high-dose, PF 0 7 mL (FLUZONE HIGH-DOSE)    KEVIN and COPD overlap syndrome (HCC)    Chronic HFrEF (heart failure with reduced ejection fraction) ([facility])    Pulmonary nodules/lesions, multiple    Very severe COPD with emphysema ([email or web address]    Needs flu shot  -     influenza vaccine, high-dose, PF 0 7 mL (FLUZONE HIGH-DOSE)        Follow-up: 3-4 months      HPI:  Was hospitalized 9/26-8/29 for heart failure/COPD  LE swelling has improved with diuretics   Increased cough - thick phlegm that is hard to expectorate    Still using bipap at night  On oxygen 3L daily with BiPAP at night  Dyspnea with minimal exertion  No chest pain or wheezing  He remains on Trelegy once daily with DuoNeb 3-4 times daily  He was seen in follow-up by Cardiology, and was recommended to use losartan and spironolactone  He did not start these medications because of concern for side effects  Review of Systems   Constitutional: Positive for activity change  HENT: Positive for congestion  Respiratory: Positive for cough and shortness of breath  Cardiovascular: Positive for leg swelling  Negative for chest pain  Gastrointestinal: Negative for abdominal distention  Musculoskeletal: Positive for arthralgias  Negative for gait problem  Psychiatric/Behavioral: Negative for sleep disturbance         The following portions of the patient's history were reviewed and updated as appropriate: allergies, current medications, past family history, past medical history, past social history, past surgical history and problem list     VITALS:  Vitals:    09/27/22 0925 09/27/22 0926   BP: 126/64    BP Location: Left arm    Patient Position: Sitting    Cuff Size: Adult    Pulse: (!) 111    Resp: 20    Temp: (!) 97 2 °F (36 2 °C)    TempSrc: Tympanic    SpO2:  94%   Weight: 48 5 kg (107 lb)    Height: 5' 2" (1 575 m)        Physical Exam  General:  Patient is awake, alert, non-toxic and in no acute respiratory distress  Neck: No JVD  CV:  Regular, +S1 and S2, No murmurs, gallops or rubs appreciated  Lungs: Greatly diminished breath sounds   Abdomen: Soft, +BS, Non-tender, non-distended  Extremities: No clubbing, cyanosis or edema  Neuro: No focal deficits        Diagnostic Testing:    CBC:  Lab Results   Component Value Date    WBC 8 55 08/26/2022    HGB 12 5 08/26/2022    HCT 40 2 08/26/2022     (H) 08/26/2022     08/26/2022         BMP:   Lab Results   Component Value Date     08/17/2015    K 4 2 09/27/2022    CL 93 (L) 09/27/2022    CO2 35 (H) 09/27/2022    ANIONGAP 6 08/17/2015    BUN 9 09/27/2022    CREATININE 0 85 09/27/2022    GLUCOSE 90 08/17/2015    GLUF 100 (H) 09/27/2022    CALCIUM 9 5 09/27/2022    CORRECTEDCA 9 6 03/28/2022    AST 14 08/26/2022    ALT 18 08/26/2022    ALKPHOS 94 08/26/2022    PROT 6 8 08/17/2015    BILITOT 0 60 08/17/2015    EGFR 91 09/27/2022       Imaging studies:  I have personally reviewed pertinent films in PACS  CXR 8/26 emphysema - no focal infiltrate      Regina Aldana, DO

## 2022-09-27 NOTE — ASSESSMENT & PLAN NOTE
In the hospital his wife states he was able to decrease the prednisone to 10 mg daily  With goal of trying to wean prednisone to the lowest dose possible, we will decrease to 15 mg daily  If he has increased symptoms he will go back to 20 mg daily  He will maintain on Trelegy once daily with DuoNeb 3-4 times daily  He is agreeable to get flu shot today

## 2022-10-04 ENCOUNTER — TELEPHONE (OUTPATIENT)
Dept: PULMONOLOGY | Facility: CLINIC | Age: 67
End: 2022-10-04

## 2022-10-04 NOTE — TELEPHONE ENCOUNTER
Patients wife is calling, she got Henry Garibay scheduled with the Cardiology office but it is with Jared Stanley  She is wondering if this is okay? She never heard anything from Dr Funez and was told to contact us if she didn't  She wants to know if she should keep this appointment on 10/6 with Jared Stanley   Please advise

## 2022-10-04 NOTE — TELEPHONE ENCOUNTER
Dr Delbra Eisenmenger, can we let patient know this cardiologist is acceptable or which would like us to try for Dr Zara Rocha

## 2022-10-05 NOTE — PROGRESS NOTES
Heart Failure Clinic Note    Vlad Grade  74 y o  male   MRN: [id number]  Encounter: [id number]        Assessment / Plan:  Patient Active Problem List    Diagnosis Date Noted    Chronic HFrEF (heart failure with reduced ejection fraction) ([place] [address] ) 09/12/2022    Mild protein-calorie malnutrition ([facility] ) 08/27/2022    Pulmonary nodules/lesions, multiple 03/27/2022    Former tobacco use 03/27/2022    Prostate cancer ([place] [address] ) 05/24/2021    BPH with obstruction/lower urinary tract symptoms 04/13/2021    Chronic hypoxemic respiratory failure ([place] [address] ) 11/19/2020    Macrocytosis without anemia 05/23/2019    Other insomnia 02/18/2019    Gastroesophageal reflux disease 01/05/2017    Coronary artery disease 02/25/2016    Mood disorder ([place] [address] ) 08/18/2015    Impaired fasting glucose 02/19/2015    Osteoporosis 10/14/2014    Vitamin D deficiency 10/14/2014    Cardiomyopathy, dilated ([place] [address] ) 09/26/2014    Hypercholesterolemia 95/44/7551    Acute systolic congestive heart failure ([facility] ) 08/03/2013    Left bundle-branch block 08/03/2013    Osteoarthritis 08/03/2013    KEVIN and COPD overlap syndrome ([place] [address] ) 07/29/2013    Chronic obstructive pulmonary disease ([place] [address] ) 07/18/2012     #  Non-ischemic Cardiomyopathy  Etiology:  Possible dysynchrony from the chronic LBBB  Despite QRS only being 120ms, the echo shows significant dysynchrony  Family screening (if appropriate): kids are adopted  Cardiac studies with imaging personally reviewed by me:  EKG 9-14-22  Sinus, LBBB, QRS 120ms     Holter or event monitor 2019 -  Sinus with LBBB  Rare PAC, PVC's  Echo 2015 - EF 35%  2017 - EF 35%  2019- EF 45-50%  8/2022 -  EF 40% with dysynchrony in setting of LBBB  LV not dilated  Mildly dilated RV with normal RV function  Mild MR   IVC not dilated       ELVIE      Cardiac MRI      Stress testing      Coronary CTA or LHC [facility] 2011 - no obstructive CAD     RHC      CPET        #  Chronic Systolic and diastololic Heart Failure  Class and Stage:  NYHA class: III   (pulm disease significantly contributing to SOB sx's)  ACC/AHA stage: C    Volume Management:  3 lbs less than last cardiology visit  Euvolemic on exam   BP a bit lower today which is limiting ability to start GDMT  Lasix 40mg daily (new RX after 8/2022 admit)  Trial reducing to every other day  K+ supplementation: has not been requiring  Recent labs reviewed, K 4 2, Cr 0 85  Neurohormonal Blockade / GDMT:  Beta blocker:   Has been limited by BP in past   Also possible concern with COPD  ACEi/ARB/ARNI:  Losartan RX'd by cardiology NP last visit  Never started  BP low today so will hold off on starting this  Aldosterone antagonist:  Robert Milligan by cardiology NP last visit  Never started  Again, BP low today so will hold off for now  SGLT2i:   Would like to avoid expensive meds  Other pharmacotherapy (isordil/hydral, digoxin, ivabradine, vericiguat, omecamtiv):  Not at this time  Sudden cardiac death risk reduction:  ICD candidacy:  Currently not indicated based on EF  Noted that patient has LBBB with QRS around 120ms  Other heart failure considerations:  Mitral clip candidate: No  IV iron considerations: No  Palliative care appropriateness:  No  Need to consider advanced therapies: Not a candidate with significant lung disease    # Non-obstructive CAD  Has coronary calcium on CT  On aspirin and statin    # HLD  crestor  Last lipids 8/2021  LDL 75  # COPD  Former smoker  Significant disease  On home O2  Follows closely with pulm  Hx of COPD exacerbation  Also contributing to SOB    # KEVIN  On BiPAP qhs    # Follows with Dr John Nurse of general cardiology  Needs to continue follow up with Dr Anju Castellon  Will need to get a follow up scheduled  Today's Plan Summary:  See above assessment/plan for full details of today's plan  Briefly,  Weight slightly down  BP a bit lower than last visit  No BP room to start any GDMT     He may be on the hypovolemic side with lasix started after recent hospitalization  Have patient reduce lasix from daily to QOD  If BP better next visit, cautiously add GDMT at that time  Reason For Visit / Chief Complaint:  Referred by Radha Arita NP (general cardiology) for a new patient visit for heart failure consultation  HPI:   Callie Munguia  is a 74 y o   male with history as noted in the problem list and further detailed in the above assessment and plan  Admit 8/2022 for ADHF and COPD exacerbation  EF was 40% (down from prior of 45-50% although further back EF had been 35% for years)  He was treated for COPD and HF   DC'd on lasix (new RX; had not previously been on diuretic)  Seen by Radha Arita NP on 9-14-22  This was a hosp f/u visit  Chronic SOb back to baseline  Started losartan and jose david at that visit  Did not start the ARB or MRA because he wanted to be sure Dr Soo Cheng is ok with it  And also was concerned about side effects  Not checking BP  But checking weights which are mostly stable  SOB is present but at baseline  No CP  No palpitations or syncope  No edema  Has orthopnea, sleeps in chair with CPAP  Does not measure fluid intake  "I don't use salt"  Does not get takeout food  Past Medical History:   Diagnosis Date    Acute metabolic encephalopathy 6/70/2502    Arthritis     Bladder mass     Cardiomyopathy (HCC)     Chest pain     COPD (chronic obstructive pulmonary disease) (HCC)     CPAP (continuous positive airway pressure) dependence     Emphysema of lung (HCC)     Hypoxia     nocturnal    Left bundle branch block     Multiple pulmonary nodules     last assessed: 10/12/16    Pneumonia     Sleep apnea     Sleep apnea, obstructive     Smoker     Weight loss 8/29/2019       Review of Systems   Constitutional: Negative for chills and fever  HENT: Negative for nosebleeds  Gastrointestinal: Negative for abdominal distention and blood in stool     Neurological: Negative for dizziness and syncope  Psychiatric/Behavioral: Negative for confusion  14-point ROS completed and negative except as stated above and/or in the HPI  No Known Allergies    Current Outpatient Medications   Medication Instructions    albuterol (PROVENTIL HFA,VENTOLIN HFA) 90 mcg/act inhaler TAKE 2 PUFFS BY MOUTH EVERY 6 HOURS AS NEEDED FOR WHEEZE OR FOR SHORTNESS OF BREATH    aspirin 81 mg, Oral, Daily    ergocalciferol (VITAMIN D2) 50,000 Units, Oral, Every 28 days    fluticasone-umeclidinium-vilanterol (Trelegy Ellipta) 200-62 5-25 MCG/INH AEPB inhaler 1 puff, Inhalation, Daily, Rinse mouth after use   furosemide (LASIX) 40 mg, Oral, Every other day    guaiFENesin (MUCINEX) 1,200 mg, Oral, Every 12 hours scheduled    ipratropium-albuterol (DUO-NEB) 0 5-2 5 mg/3 mL nebulizer solution 3 mL, Nebulization, 4 times daily    predniSONE 5 mg tablet Take 3-4 tablets daily    rosuvastatin (CRESTOR) 10 mg, Oral, Daily       Social History     Socioeconomic History    Marital status: /Civil Union     Spouse name: Not on file    Number of children: Not on file    Years of education: Not on file    Highest education level: Not on file   Occupational History    Not on file   Tobacco Use    Smoking status: Former Smoker     Packs/day: 2 50     Years: 42 00     Pack years: 105 00     Types: Cigarettes     Start date: 1970     Quit date: 2012     Years since quitting: 10 7    Smokeless tobacco: Former User    Tobacco comment: 1 ppd for 37 years, 2010 down to 5 cigs a day, is around second hand smoke   Vaping Use    Vaping Use: Never used   Substance and Sexual Activity    Alcohol use: Not Currently     Comment: rarely    Drug use: No    Sexual activity: Not Currently     Partners: Female   Other Topics Concern    Not on file   Social History Narrative    Daily coffee consumption: 8 or more cups a day        Used to work in [employer]  On disability       Social Determinants of Health     Financial Resource Strain: Not on file   Food Insecurity: No Food Insecurity    Worried About Running Out of Food in the Last Year: Never true    Ran Out of Food in the Last Year: Never true   Transportation Needs: No Transportation Needs    Lack of Transportation (Medical): No    Lack of Transportation (Non-Medical): No   Physical Activity: Not on file   Stress: Not on file   Social Connections: Not on file   Intimate Partner Violence: Not on file   Housing Stability: Unknown    Unable to Pay for Housing in the Last Year: No    Number of Places Lived in the Last Year: Not on file    Unstable Housing in the Last Year: No       Family History   Problem Relation Age of Onset    Diabetes Mother        Physical Exam:  Blood pressure 90/58, pulse 91, resp  rate 18, height 5' 2" (1 575 m), weight 50 3 kg (111 lb), SpO2 91 %  Body mass index is 20 3 kg/m²  Wt Readings from Last 3 Encounters:   10/06/22 50 3 kg (111 lb)   09/27/22 48 5 kg (107 lb)   09/14/22 51 7 kg (114 lb)     Physical Exam  Vitals reviewed  Constitutional:       General: He is not in acute distress  Appearance: He is not toxic-appearing  Comments: In wheelchair  On home O2 nasal canula  HENT:      Head: Normocephalic and atraumatic  Eyes:      General: No scleral icterus  Conjunctiva/sclera: Conjunctivae normal    Neck:      Vascular: No carotid bruit  Comments: JVP is not elevated  Cardiovascular:      Rate and Rhythm: Normal rate and regular rhythm  Heart sounds: No murmur heard  Comments: Extremely distant heart sounds  Pulmonary:      Comments: Very poor air movement bilaterally  Prolonted expiratory phase  Faint crackles at bases  Abdominal:      General: There is no distension  Palpations: Abdomen is soft  Tenderness: There is no abdominal tenderness  There is no guarding  Musculoskeletal:      Right lower leg: No edema  Left lower leg: No edema     Skin:     Coloration: Skin is not jaundiced or pale  Findings: No erythema  Neurological:      Mental Status: He is alert  Mental status is at baseline  Psychiatric:         Mood and Affect: Mood normal          Behavior: Behavior normal          Labs & Results:  Lab Results   Component Value Date    SODIUM 134 (L) 09/27/2022    K 4 2 09/27/2022    CL 93 (L) 09/27/2022    CO2 35 (H) 09/27/2022    BUN 9 09/27/2022    CREATININE 0 85 09/27/2022    GLUC 157 (H) 08/27/2022    CALCIUM 9 5 09/27/2022     Lab Results   Component Value Date    NTBNP 1,685 (H) 08/26/2022        Thank you for the opportunity to participate in the care of this patient      Carina Montejo MD, [facility]  Advanced Heart Failure and Transplant Cardiologist  Director of [facility] Jerilyn Mendoza

## 2022-10-06 ENCOUNTER — OFFICE VISIT (OUTPATIENT)
Dept: CARDIOLOGY CLINIC | Facility: CLINIC | Age: 67
End: 2022-10-06
Payer: MEDICARE

## 2022-10-06 VITALS
SYSTOLIC BLOOD PRESSURE: 90 MMHG | BODY MASS INDEX: 20.43 KG/M2 | OXYGEN SATURATION: 91 % | HEIGHT: 62 IN | RESPIRATION RATE: 18 BRPM | WEIGHT: 111 LBS | DIASTOLIC BLOOD PRESSURE: 58 MMHG | HEART RATE: 91 BPM

## 2022-10-06 DIAGNOSIS — E86.1 HYPOTENSION DUE TO HYPOVOLEMIA: ICD-10-CM

## 2022-10-06 DIAGNOSIS — I25.10 CORONARY ARTERY DISEASE INVOLVING NATIVE CORONARY ARTERY OF NATIVE HEART WITHOUT ANGINA PECTORIS: ICD-10-CM

## 2022-10-06 DIAGNOSIS — I95.89 HYPOTENSION DUE TO HYPOVOLEMIA: ICD-10-CM

## 2022-10-06 DIAGNOSIS — I50.22 CHRONIC SYSTOLIC HEART FAILURE (HCC): Primary | ICD-10-CM

## 2022-10-06 DIAGNOSIS — I50.23 ACUTE ON CHRONIC SYSTOLIC (CONGESTIVE) HEART FAILURE (HCC): ICD-10-CM

## 2022-10-06 PROCEDURE — 99204 OFFICE O/P NEW MOD 45 MIN: CPT | Performed by: INTERNAL MEDICINE

## 2022-10-06 PROCEDURE — 99214 OFFICE O/P EST MOD 30 MIN: CPT | Performed by: INTERNAL MEDICINE

## 2022-10-06 RX ORDER — FUROSEMIDE 40 MG/1
40 TABLET ORAL EVERY OTHER DAY
Qty: 30 TABLET | Refills: 3
Start: 2022-10-06 | End: 2022-10-19 | Stop reason: SDUPTHER

## 2022-10-06 NOTE — Clinical Note
Sejal Meneses, MIKE had referred this patient to me after she saw him for a hospital f/u (after admit for COPD and CHF exacerbation)  I'll see him a few times from the Heart Failure perspective to optimize diuretic dose and see if any GDMT is possible (has low BP) -- and also at the same time get him back to f/u with you as well    Thanks, Marlon Kaur

## 2022-10-06 NOTE — PATIENT INSTRUCTIONS
Try reducing lasix (furosemide) from every day -- down to every OTHER day  Continue to follow your weights daily  Start checking home BP's and bring the measurements to the next visit for review

## 2022-10-19 DIAGNOSIS — J43.9 PULMONARY EMPHYSEMA, UNSPECIFIED EMPHYSEMA TYPE (HCC): Chronic | ICD-10-CM

## 2022-10-19 DIAGNOSIS — I50.23 ACUTE ON CHRONIC SYSTOLIC (CONGESTIVE) HEART FAILURE (HCC): ICD-10-CM

## 2022-10-19 RX ORDER — ROSUVASTATIN CALCIUM 10 MG/1
TABLET, COATED ORAL
Qty: 90 TABLET | Refills: 3 | Status: SHIPPED | OUTPATIENT
Start: 2022-10-19

## 2022-10-19 RX ORDER — FUROSEMIDE 40 MG/1
40 TABLET ORAL EVERY OTHER DAY
Qty: 30 TABLET | Refills: 3 | Status: SHIPPED | OUTPATIENT
Start: 2022-10-19

## 2022-11-01 NOTE — PROGRESS NOTES
Heart Failure Clinic Note    Laine Nearing  74 y o  male   MRN: [id number]  Encounter: [id number]        Assessment / Plan:  Patient Active Problem List    Diagnosis Date Noted   • Chronic HFrEF (heart failure with reduced ejection fraction) ([place] [address] ) 09/12/2022   • Mild protein-calorie malnutrition ([place] [address] ) 08/27/2022   • Pulmonary nodules/lesions, multiple 03/27/2022   • Former tobacco use 03/27/2022   • Prostate cancer ([place] [address] ) 05/24/2021   • BPH with obstruction/lower urinary tract symptoms 04/13/2021   • Chronic hypoxemic respiratory failure ([place] [address] ) 11/19/2020   • Macrocytosis without anemia 05/23/2019   • Other insomnia 02/18/2019   • Gastroesophageal reflux disease 01/05/2017   • Coronary artery disease 02/25/2016   • Mood disorder ([place] [address] ) 08/18/2015   • Impaired fasting glucose 02/19/2015   • Osteoporosis 10/14/2014   • Vitamin D deficiency 10/14/2014   • Cardiomyopathy, dilated ([place] [address] ) 09/26/2014   • Hypercholesterolemia 36/48/9443   • Acute systolic congestive heart failure ([place] [address] ) 08/03/2013   • Left bundle-branch block 08/03/2013   • Osteoarthritis 08/03/2013   • KEVIN and COPD overlap syndrome ([place] [address] ) 07/29/2013   • Chronic obstructive pulmonary disease ([place] [address] ) 07/18/2012     #  Non-ischemic Cardiomyopathy  Etiology:  Possible dysynchrony from the chronic LBBB  Despite QRS only being 120ms, the echo shows significant dysynchrony  Family screening (if appropriate): kids are adopted  Cardiac studies with imaging personally reviewed by me:  EKG 9-14-22  Sinus, LBBB, QRS 120ms     Holter or event monitor 2019 -  Sinus with LBBB  Rare PAC, PVC's  Echo 2015 - EF 35%  2017 - EF 35%  2019- EF 45-50%  8/2022 -  EF 40% with dysynchrony in setting of LBBB  LV not dilated  Mildly dilated RV with normal RV function  Mild MR   IVC not dilated       ELVIE      Cardiac MRI      Stress testing      Coronary CTA or LHC [facility] 2011 - no obstructive CAD     RHC      CPET        #  Chronic Systolic and diastololic Heart Failure  Class and Stage:  NYHA class: III  (pulm disease significantly contributing to SOB sx's)  ACC/AHA stage: C    Volume Management:  Lasix 40mg QOD (reduced from daily last visit)  Euvolemic on exam today  K+ supplementation: has not been requiring potassium  Neurohormonal Blockade / GDMT:  Beta blocker:   Has been limited by BP in past   Also possible concern with COPD  ACEi/ARB/ARNI:  Losartan RX'd by cardiology NP last visit  Never started  He is really hesitant to start this  We will RX a BP cuff and check home BP's first and discuss starting next visit  Aldosterone antagonist:  None  SGLT2i:   Would like to avoid expensive meds  Other pharmacotherapy (isordil/hydral, digoxin, ivabradine, vericiguat, omecamtiv):  Not at this time  Sudden cardiac death risk reduction:  ICD candidacy:  Currently not indicated based on EF  Noted that patient has LBBB with QRS around 120ms  Other heart failure considerations:  Mitral clip candidate: No  IV iron considerations: No  Palliative care appropriateness:  No  Need to consider advanced therapies: Not a candidate with significant lung disease    # Non-obstructive CAD  Has coronary calcium on CT  On aspirin and statin    # HLD  crestor  Last lipids 8/2021  LDL 75  # COPD  Former smoker  Significant disease  On home O2  Follows closely with pulm  Hx of COPD exacerbation  Also contributing to SOB    # KEVIN  On BiPAP qhs    # Follows with Dr Estefany Pascal of general cardiology  Needs to continue follow up with Dr Linda Venegas  Will need to get a follow up scheduled  Today's Plan Summary:  See above assessment/plan for full details of today's plan  Briefly,    Lasix reduced last visit (concerned about developing hypovolemia)  Weight stable on lower dose of lasix and euvolemic on exam today  We spent a lot of time discussing the benefits of GDMT in setting of LV dysfunction  He is very hesitant to start a low dose ARB     We agreed to RX a home BP cuff and record daily blood pressures  Bring data to next visit to decide if we can start low dose ARB  Reason For Visit / Chief Complaint:  Follow up    HPI:   Gil Kwok  is a 74 y o   male with history as noted in the problem list and further detailed in the above assessment and plan  Initial 10-6-22:  Admit 8/2022 for ADHF and COPD exacerbation  EF was 40% (down from prior of 45-50% although further back EF had been 35% for years)  He was treated for COPD and HF   DC'd on lasix (new RX; had not previously been on diuretic)  Seen by Denice Greenwood NP on 9-14-22  This was a hosp f/u visit  Chronic SOb back to baseline  Started losartan and jose david at that visit  Did not start the ARB or MRA because he wanted to be sure Dr Rene Islas is ok with it  And also was concerned about side effects  Not checking BP  But checking weights which are mostly stable  SOB is present but at baseline  Has orthopnea, sleeps in chair with CPAP  Does not measure fluid intake  "I don't use salt"  At that visit - no BP room to start GDMT  Felt possibly on the hypovolemic side with recently being started on lasix  Rec reducing lasix to QOD  Interim:  No labs or imaging  Today, reports breathing is at baseline  Always has SOB on exertion  We reduced lasix to QOD last visit and reports tolerated that change  No edema  No CP  No syncope           Past Medical History:   Diagnosis Date   • Acute metabolic encephalopathy 7/59/0677   • Arthritis    • Bladder mass    • Cardiomyopathy [facility])    • Chest pain    • COPD (chronic obstructive pulmonary disease) ([facility])    • CPAP (continuous positive airway pressure) dependence    • Emphysema of lung ([facility])    • Hypoxia     nocturnal   • Left bundle branch block    • Multiple pulmonary nodules     last assessed: 10/12/16   • Pneumonia    • Sleep apnea    • Sleep apnea, obstructive    • Smoker    • Weight loss 8/29/2019         No Known Allergies    Current Outpatient Medications   Medication Instructions   • albuterol (PROVENTIL HFA,VENTOLIN HFA) 90 mcg/act inhaler TAKE 2 PUFFS BY MOUTH EVERY 6 HOURS AS NEEDED FOR WHEEZE OR FOR SHORTNESS OF BREATH   • aspirin 81 mg, Oral, Daily   • ergocalciferol (VITAMIN D2) 50,000 Units, Oral, Every 28 days   • fluticasone-umeclidinium-vilanterol (Trelegy Ellipta) 200-62 5-25 MCG/INH AEPB inhaler 1 puff, Inhalation, Daily, Rinse mouth after use  • furosemide (LASIX) 40 mg, Oral, Every other day   • guaiFENesin (MUCINEX) 1,200 mg, Oral, Every 12 hours scheduled   • ipratropium-albuterol (DUO-NEB) 0 5-2 5 mg/3 mL nebulizer solution 3 mL, Nebulization, 4 times daily   • predniSONE 5 mg tablet Take 3-4 tablets daily   • rosuvastatin (CRESTOR) 10 MG tablet TAKE 1 TABLET BY MOUTH EVERY DAY       Social History     Socioeconomic History   • Marital status: /Civil Union     Spouse name: Not on file   • Number of children: Not on file   • Years of education: Not on file   • Highest education level: Not on file   Occupational History   • Not on file   Tobacco Use   • Smoking status: Former Smoker     Packs/day: 2 50     Years: 42 00     Pack years: 105 00     Types: Cigarettes     Start date: 5     Quit date: 2012     Years since quitting: 10 8   • Smokeless tobacco: Former User   • Tobacco comment: 1 ppd for 37 years, 2010 down to 5 cigs a day, is around second hand smoke   Vaping Use   • Vaping Use: Never used   Substance and Sexual Activity   • Alcohol use: Not Currently     Comment: rarely   • Drug use: No   • Sexual activity: Not Currently     Partners: Female   Other Topics Concern   • Not on file   Social History Narrative    Daily coffee consumption: 8 or more cups a day        Used to work in [employer]  On disability       Social Determinants of Health     Financial Resource Strain: Not on file   Food Insecurity: No Food Insecurity   • Worried About [address] in the Last Year: Never true   • Ran Out of Food in the Last Year: Never true   Transportation Needs: No Transportation Needs   • Lack of Transportation (Medical): No   • Lack of Transportation (Non-Medical): No   Physical Activity: Not on file   Stress: Not on file   Social Connections: Not on file   Intimate Partner Violence: Not on file   Housing Stability: Unknown   • Unable to Pay for Housing in the Last Year: No   • Number of Places Lived in the Last Year: Not on file   • Unstable Housing in the Last Year: No       Family History   Problem Relation Age of Onset   • Diabetes Mother        Physical Exam:  Blood pressure 100/58, pulse 69, height 5' 2" (1 575 m), weight 49 8 kg (109 lb 12 8 oz), SpO2 97 %  Body mass index is 20 08 kg/m²  Wt Readings from Last 3 Encounters:   11/02/22 49 8 kg (109 lb 12 8 oz)   10/06/22 50 3 kg (111 lb)   09/27/22 48 5 kg (107 lb)     Physical Exam  Vitals reviewed  Constitutional:       General: He is not in acute distress  Appearance: He is not toxic-appearing  Comments: In wheelchair  On home O2 nasal canula  HENT:      Head: Normocephalic and atraumatic  Eyes:      General: No scleral icterus  Conjunctiva/sclera: Conjunctivae normal    Neck:      Comments: Reviewed upright and at 45 degrees, JVP not elevated  Cardiovascular:      Rate and Rhythm: Normal rate and regular rhythm  Heart sounds: No murmur heard  Comments: distant heart sounds  Pulmonary:      Comments: poor air movement bilateral lung fields  Prolonged expiratory phase  Faint crackles at bases  No wheezes  Abdominal:      General: There is no distension  Palpations: Abdomen is soft  Tenderness: There is no abdominal tenderness  There is no guarding  Musculoskeletal:      Right lower leg: No edema  Left lower leg: No edema  Skin:     Coloration: Skin is not jaundiced or pale  Findings: No erythema  Neurological:      Mental Status: He is alert  Mental status is at baseline     Psychiatric:         Mood and Affect: Mood normal          Behavior: Behavior normal          Labs & Results:  Lab Results   Component Value Date    SODIUM 134 (L) 09/27/2022    K 4 2 09/27/2022    CL 93 (L) 09/27/2022    CO2 35 (H) 09/27/2022    BUN 9 09/27/2022    CREATININE 0 85 09/27/2022    GLUC 157 (H) 08/27/2022    CALCIUM 9 5 09/27/2022     Lab Results   Component Value Date    NTBNP 1,685 (H) 08/26/2022        Thank you for the opportunity to participate in the care of this patient      Natalie Nolasco MD, [facility]  Advanced Heart Failure and Transplant Cardiologist  Director of [facility] Jerilyn Mendoza

## 2022-11-02 ENCOUNTER — OFFICE VISIT (OUTPATIENT)
Dept: CARDIOLOGY CLINIC | Facility: CLINIC | Age: 67
End: 2022-11-02

## 2022-11-02 ENCOUNTER — TELEPHONE (OUTPATIENT)
Dept: CARDIOLOGY CLINIC | Facility: CLINIC | Age: 67
End: 2022-11-02

## 2022-11-02 VITALS
BODY MASS INDEX: 20.2 KG/M2 | DIASTOLIC BLOOD PRESSURE: 58 MMHG | OXYGEN SATURATION: 97 % | HEIGHT: 62 IN | WEIGHT: 109.8 LBS | SYSTOLIC BLOOD PRESSURE: 100 MMHG | HEART RATE: 69 BPM

## 2022-11-02 DIAGNOSIS — I25.10 CORONARY ARTERY DISEASE INVOLVING NATIVE CORONARY ARTERY OF NATIVE HEART WITHOUT ANGINA PECTORIS: ICD-10-CM

## 2022-11-02 DIAGNOSIS — E86.1 HYPOTENSION DUE TO HYPOVOLEMIA: ICD-10-CM

## 2022-11-02 DIAGNOSIS — I50.22 CHRONIC SYSTOLIC HEART FAILURE (HCC): Primary | ICD-10-CM

## 2022-11-02 DIAGNOSIS — I95.89 HYPOTENSION DUE TO HYPOVOLEMIA: ICD-10-CM

## 2022-11-02 LAB
DME PARACHUTE DELIVERY DATE REQUESTED: NORMAL
DME PARACHUTE ITEM DESCRIPTION: NORMAL
DME PARACHUTE ORDER STATUS: NORMAL
DME PARACHUTE SUPPLIER NAME: NORMAL
DME PARACHUTE SUPPLIER PHONE: NORMAL

## 2022-11-11 DIAGNOSIS — I50.23 ACUTE ON CHRONIC SYSTOLIC (CONGESTIVE) HEART FAILURE (HCC): ICD-10-CM

## 2022-11-11 RX ORDER — FUROSEMIDE 40 MG/1
TABLET ORAL
Qty: 45 TABLET | Refills: 3 | Status: SHIPPED | OUTPATIENT
Start: 2022-11-11

## 2022-11-15 DIAGNOSIS — J44.9 COPD, VERY SEVERE (HCC): ICD-10-CM

## 2022-11-15 NOTE — TELEPHONE ENCOUNTER
Pt has called in requesting a refill for the Trelegy inhaler   Please advise     [id number]

## 2022-11-22 DIAGNOSIS — J44.9 COPD, VERY SEVERE (HCC): ICD-10-CM

## 2022-11-22 RX ORDER — IPRATROPIUM BROMIDE AND ALBUTEROL SULFATE 2.5; .5 MG/3ML; MG/3ML
3 SOLUTION RESPIRATORY (INHALATION) 4 TIMES DAILY
Qty: 360 ML | Refills: 5 | Status: SHIPPED | OUTPATIENT
Start: 2022-11-22

## 2022-12-06 ENCOUNTER — TELEPHONE (OUTPATIENT)
Dept: CARDIOLOGY CLINIC | Facility: CLINIC | Age: 67
End: 2022-12-06

## 2022-12-06 NOTE — TELEPHONE ENCOUNTER
Spoke with pt, Wt is stable at 106 lbs  107/48,  94  Has foot edema that started last night  He takes lasix QOD  Did take lasix 40 mg this am, urinating plenty  Advised to get compression socks to help with the circulation  Denies any other CHF symptoms  Had chicken for dinner last not and is following the low na and fluid restriction  Will call pt tomorrow to see if the lasix worked  Verbally understood

## 2022-12-06 NOTE — TELEPHONE ENCOUNTER
Wife called, pt having foot edema  Spoke with wife , but she could not answer my questions  Called pt , no answer, LMOM to call me back on [phone]

## 2022-12-07 NOTE — TELEPHONE ENCOUNTER
Spoke with pt, foot edema has improved  He will wear compression socks  He will call if edema gets worse of does not continue to improve  Will call with wt gain

## 2022-12-09 NOTE — PROGRESS NOTES
Heart Failure Clinic Note    Enrique Kan  74 y o  male   MRN: [id number]  Encounter: [id number]        Assessment / Plan:  Patient Active Problem List    Diagnosis Date Noted   • Chronic HFrEF (heart failure with reduced ejection fraction) ([place] [address] ) 09/12/2022   • Mild protein-calorie malnutrition ([place] [address] ) 08/27/2022   • Pulmonary nodules/lesions, multiple 03/27/2022   • Former tobacco use 03/27/2022   • Prostate cancer ([place] [address] ) 05/24/2021   • BPH with obstruction/lower urinary tract symptoms 04/13/2021   • Chronic hypoxemic respiratory failure ([place] [address] ) 11/19/2020   • Macrocytosis without anemia 05/23/2019   • Other insomnia 02/18/2019   • Gastroesophageal reflux disease 01/05/2017   • Coronary artery disease 02/25/2016   • Mood disorder ([place] [address] ) 08/18/2015   • Impaired fasting glucose 02/19/2015   • Osteoporosis 10/14/2014   • Vitamin D deficiency 10/14/2014   • Cardiomyopathy, dilated ([place] [address] ) 09/26/2014   • Hypercholesterolemia 55/65/1449   • Acute systolic congestive heart failure ([place] [address] ) 08/03/2013   • Left bundle-branch block 08/03/2013   • Osteoarthritis 08/03/2013   • KEVIN and COPD overlap syndrome ([place] [address] ) 07/29/2013   • Chronic obstructive pulmonary disease ([place] [address] ) 07/18/2012     #  Non-ischemic Cardiomyopathy  • Etiology:  o Possible dysynchrony from the chronic LBBB  Despite QRS only being 120ms, the echo shows significant dysynchrony  o Family screening (if appropriate): kids are adopted  #  Chronic Systolic and diastololic Heart Failure  • Class and Stage:  o NYHA class: III  (pulm disease significantly contributing to SOB sx's)  o ACC/AHA stage: C    • Volume Management:  o Currently on Lasix 40mg QOD   o Weight is trending down  He is not eating well  I got labs after the visit and this showed rising BUN, rising Cr, elevated bicarb  This all suggests hypovolemia      o Reduce lasix to PRN  o Work on better nutrition and hydration  o Repeat BMP 1 week    o Above plan discussed with his wife by phone    • Neurohormonal Blockade / GDMT:  o Beta blocker:   Has been limited by BP in past   Also possible concern with COPD   o ACEi/ARB/ARNI:  Holding off with soft BP and ARAM  o Aldosterone antagonist:  None  o SGLT2i:   Would like to avoid expensive meds  o Other pharmacotherapy:   Not at this time  • Sudden cardiac death risk reduction:  o ICD candidacy:  Currently not indicated based on EF  o Noted that patient has LBBB with QRS around 120ms  • Other heart failure considerations:  o Mitral clip candidate: No  o IV iron considerations: No  o Palliative care appropriateness:   I started to bring this up today  His wife was tearful during the conversation  He stated he was not ready to have a palliative care consult    o Need to consider advanced therapies: Not a candidate with significant lung disease    # Non-obstructive CAD  • Has coronary calcium on CT  • On aspirin and statin    # HLD  • crestor  • Last lipids 8/2021  LDL 75  # COPD  • Former smoker  • Significant disease  On home O2  Follows closely with pulm  • Hx of COPD exacerbation  • Also contributing significantly to his SOB    # KEVIN  • On BiPAP qhs    # Follows with Dr Hong Zapata of general cardiology  • Needs to continue follow up with Dr Waldemar Barry  We will make sure he has an appointment  Today's Plan Summary:  See above assessment/plan for full details of today's plan  Briefly,    Labs today showed ARAM  Weight trending down  Recommend stopping scheduled lasix, moving it to PRN  Also recommend better po intake and hydration  Repeat BMP 1 week  He has an appointment with Dr Waldemar Barry in January  I'll see him back from the HF perspective in February  Reason For Visit / Chief Complaint:  Follow up heart failure    HPI:   Ulysses Ayala  is a 74 y o   male with history as noted in the problem list and further detailed in the above assessment and plan      Initial 10-6-22:  Admit 8/2022 for ADHF and COPD exacerbation  EF was 40% (down from prior of 45-50% although further back EF had been 35% for years)  He was treated for COPD and HF   DC'd on lasix (new RX; had not previously been on diuretic)  Seen by Jesus Singh NP on 9-14-22  This was a hosp f/u visit  Chronic SOb back to baseline  Not checking BP  But checking weights which are mostly stable  SOB is present but at baseline  Has orthopnea, sleeps in chair with CPAP  Does not measure fluid intake  "I don't use salt"  At that visit - no BP room to start GDMT  Felt possibly on the hypovolemic side with recently being started on lasix  Rec reducing lasix to QOD  Interim:   At the last visit his weight was stable on the lower dose of Lasix and he was euvolemic on exam   We discussed the importance of GDMT, however, the patient was very hesitant to start low-dose ARB  We finally agreed to start checking home blood pressures and bring data to next visit to decide if we can start low-dose ARB  A few weeks ago, he called with foot edema  He was instructed to try compression stockings and the next day reported it had improved  Today,  Brings in home BP measurements  They fluctuate a lot  Some systolic BP as low as upper 80s  Others readings where systolic BP up to 131O  Reports he is loosing weight  Not eating much  His wife is tearful when talking about this  Thinks he might have a URI right now  No chest pain  No syncope  No current LE edema  Cardiac studies with imaging personally reviewed by me:  EKG • 9-14-22  Sinus, LBBB, QRS 120ms     Holter or event monitor • 2019 -  Sinus with LBBB  Rare PAC, PVC's  Echo • 2015 - EF 35%  • 2017 - EF 35%  • 2019- EF 45-50%  • 8/2022 -  EF 40% with dysynchrony in setting of LBBB  LV not dilated  Mildly dilated RV with normal RV function  Mild MR   IVC not dilated       ELVIE •      Cardiac MRI •      Stress testing •      Coronary CTA or LHC • LHC 2011 - no obstructive CAD     [facility] •      CPET •              Past Medical History:   Diagnosis Date   • Acute metabolic encephalopathy 5/26/3877   • Arthritis    • Bladder mass    • Cardiomyopathy [facility])    • Chest pain    • COPD (chronic obstructive pulmonary disease) ([facility])    • CPAP (continuous positive airway pressure) dependence    • Emphysema of lung ([facility])    • Hypoxia     nocturnal   • Left bundle branch block    • Multiple pulmonary nodules     last assessed: 10/12/16   • Pneumonia    • Sleep apnea    • Sleep apnea, obstructive    • Smoker    • Weight loss 8/29/2019         No Known Allergies    Current Outpatient Medications   Medication Instructions   • albuterol (PROVENTIL HFA,VENTOLIN HFA) 90 mcg/act inhaler TAKE 2 PUFFS BY MOUTH EVERY 6 HOURS AS NEEDED FOR WHEEZE OR FOR SHORTNESS OF BREATH   • aspirin 81 mg, Oral, Daily   • ergocalciferol (VITAMIN D2) 50,000 Units, Oral, Every 28 days   • fluticasone-umeclidinium-vilanterol 200-62 5-25 mcg/actuation AEPB inhaler 1 puff, Inhalation, Daily, Rinse mouth after use     • furosemide (LASIX) 20 mg, Oral, Every other day   • guaiFENesin (MUCINEX) 1,200 mg, Oral, Every 12 hours scheduled   • ipratropium-albuterol (DUO-NEB) 0 5-2 5 mg/3 mL nebulizer solution 3 mL, Nebulization, 4 times daily   • predniSONE 5 mg tablet Take 3-4 tablets daily   • rosuvastatin (CRESTOR) 10 MG tablet TAKE 1 TABLET BY MOUTH EVERY DAY       Social History     Socioeconomic History   • Marital status: /Civil Union     Spouse name: Not on file   • Number of children: Not on file   • Years of education: Not on file   • Highest education level: Not on file   Occupational History   • Not on file   Tobacco Use   • Smoking status: Former     Packs/day: 2 50     Years: 42 00     Pack years: 105 00     Types: Cigarettes     Start date: 5     Quit date: 2012     Years since quitting: 10 9   • Smokeless tobacco: Former   • Tobacco comments:     1 ppd for 37 years, 2010 down to 5 cigs a day, is around second hand smoke Vaping Use   • Vaping Use: Never used   Substance and Sexual Activity   • Alcohol use: Not Currently     Comment: rarely   • Drug use: No   • Sexual activity: Not Currently     Partners: Female   Other Topics Concern   • Not on file   Social History Narrative    Daily coffee consumption: 8 or more cups a day        Used to work in [employer]  On disability  Social Determinants of Health     Financial Resource Strain: Not on file   Food Insecurity: No Food Insecurity   • Worried About [employer] [employer] in the Last Year: Never true   • Ran Out of Food in the Last Year: Never true   Transportation Needs: No Transportation Needs   • Lack of Transportation (Medical): No   • Lack of Transportation (Non-Medical): No   Physical Activity: Not on file   Stress: Not on file   Social Connections: Not on file   Intimate Partner Violence: Not on file   Housing Stability: Unknown   • Unable to Pay for Housing in the Last Year: No   • Number of Places Lived in the Last Year: Not on file   • Unstable Housing in the Last Year: No       Family History   Problem Relation Age of Onset   • Diabetes Mother        Physical Exam:  Blood pressure 104/60, pulse 94, height 5' 2" (1 575 m), weight 44 3 kg (97 lb 9 6 oz), SpO2 (!) 85 %  Body mass index is 17 85 kg/m²  Wt Readings from Last 3 Encounters:   12/19/22 44 3 kg (97 lb 9 6 oz)   11/02/22 49 8 kg (109 lb 12 8 oz)   10/06/22 50 3 kg (111 lb)     Physical Exam  Vitals reviewed  Constitutional:       General: He is not in acute distress  Appearance: He is ill-appearing  Comments: On home O2 nasal canula  HENT:      Head: Normocephalic and atraumatic  Eyes:      General: No scleral icterus  Conjunctiva/sclera: Conjunctivae normal    Neck:      Comments: Reviewed upright and at 45 degrees, JVP not elevated  Cardiovascular:      Rate and Rhythm: Normal rate and regular rhythm  Heart sounds: No murmur heard       Comments: distant heart sounds  Pulmonary:      Comments: poor air movement bilateral lung fields  Prolonged expiratory phase  Faint crackles at bases  occasional wheezes  Abdominal:      General: There is no distension  Palpations: Abdomen is soft  Tenderness: There is no abdominal tenderness  There is no guarding  Musculoskeletal:      Right lower leg: No edema  Left lower leg: No edema  Skin:     Coloration: Skin is not jaundiced or pale  Findings: No erythema  Neurological:      Mental Status: He is alert  Mental status is at baseline  Psychiatric:         Mood and Affect: Mood normal          Behavior: Behavior normal          Labs & Results:  Lab Results   Component Value Date    SODIUM 134 (L) 09/27/2022    K 4 2 09/27/2022    CL 93 (L) 09/27/2022    CO2 35 (H) 09/27/2022    BUN 9 09/27/2022    CREATININE 0 85 09/27/2022    GLUC 157 (H) 08/27/2022    CALCIUM 9 5 09/27/2022     Lab Results   Component Value Date    NTBNP 1,685 (H) 08/26/2022        Thank you for the opportunity to participate in the care of this patient      Geovanna Leung MD, [facility]  Advanced Heart Failure and Transplant Cardiologist  Director of [facility] Jerilyn Mendoza

## 2022-12-11 DIAGNOSIS — M81.6 LOCALIZED OSTEOPOROSIS WITHOUT CURRENT PATHOLOGICAL FRACTURE: ICD-10-CM

## 2022-12-11 DIAGNOSIS — E55.9 VITAMIN D DEFICIENCY: ICD-10-CM

## 2022-12-11 RX ORDER — ERGOCALCIFEROL 1.25 MG/1
50000 CAPSULE ORAL
Qty: 3 CAPSULE | Refills: 1 | Status: SHIPPED | OUTPATIENT
Start: 2022-12-11

## 2022-12-19 ENCOUNTER — OFFICE VISIT (OUTPATIENT)
Dept: CARDIOLOGY CLINIC | Facility: CLINIC | Age: 67
End: 2022-12-19

## 2022-12-19 ENCOUNTER — APPOINTMENT (OUTPATIENT)
Dept: LAB | Facility: CLINIC | Age: 67
End: 2022-12-19

## 2022-12-19 VITALS
WEIGHT: 97.6 LBS | SYSTOLIC BLOOD PRESSURE: 104 MMHG | DIASTOLIC BLOOD PRESSURE: 60 MMHG | BODY MASS INDEX: 17.96 KG/M2 | HEART RATE: 94 BPM | OXYGEN SATURATION: 85 % | HEIGHT: 62 IN

## 2022-12-19 DIAGNOSIS — N17.9 AKI (ACUTE KIDNEY INJURY) (HCC): ICD-10-CM

## 2022-12-19 DIAGNOSIS — I50.23 ACUTE ON CHRONIC SYSTOLIC (CONGESTIVE) HEART FAILURE (HCC): ICD-10-CM

## 2022-12-19 DIAGNOSIS — C61 PROSTATE CANCER (HCC): ICD-10-CM

## 2022-12-19 DIAGNOSIS — J44.9 COPD, VERY SEVERE (HCC): Primary | ICD-10-CM

## 2022-12-19 DIAGNOSIS — E78.00 HYPERCHOLESTEROLEMIA: ICD-10-CM

## 2022-12-19 LAB
ANION GAP SERPL CALCULATED.3IONS-SCNC: 5 MMOL/L (ref 4–13)
BUN SERPL-MCNC: 39 MG/DL (ref 5–25)
CALCIUM SERPL-MCNC: 9.5 MG/DL (ref 8.3–10.1)
CHLORIDE SERPL-SCNC: 88 MMOL/L (ref 96–108)
CO2 SERPL-SCNC: 41 MMOL/L (ref 21–32)
CREAT SERPL-MCNC: 1.25 MG/DL (ref 0.6–1.3)
GFR SERPL CREATININE-BSD FRML MDRD: 60 ML/MIN/1.73SQ M
GLUCOSE P FAST SERPL-MCNC: 178 MG/DL (ref 65–99)
POTASSIUM SERPL-SCNC: 3.5 MMOL/L (ref 3.5–5.3)
SODIUM SERPL-SCNC: 134 MMOL/L (ref 135–147)

## 2022-12-19 RX ORDER — FUROSEMIDE 20 MG/1
20 TABLET ORAL DAILY PRN
Qty: 15 TABLET | Refills: 3
Start: 2022-12-19

## 2022-12-19 RX ORDER — FUROSEMIDE 20 MG/1
20 TABLET ORAL EVERY OTHER DAY
Qty: 15 TABLET | Refills: 3 | Status: SHIPPED | OUTPATIENT
Start: 2022-12-19 | End: 2022-12-19

## 2022-12-19 NOTE — PATIENT INSTRUCTIONS
Currently you are taking lasix 40mg every other day  Recommend REDUCE this  Start doing lasix 20mg every other day  Get your blood checked

## 2022-12-21 ENCOUNTER — HOSPITAL ENCOUNTER (OUTPATIENT)
Dept: RADIOLOGY | Facility: HOSPITAL | Age: 67
Discharge: HOME | End: 2022-12-21
Attending: INTERNAL MEDICINE

## 2022-12-21 DIAGNOSIS — R91.8 PULMONARY NODULES/LESIONS, MULTIPLE: ICD-10-CM

## 2022-12-21 LAB — PSA SERPL-MCNC: 0.7 NG/ML (ref 0–4)

## 2022-12-26 ENCOUNTER — LAB (OUTPATIENT)
Dept: LAB | Facility: CLINIC | Age: 67
End: 2022-12-26

## 2022-12-26 DIAGNOSIS — N17.9 AKI (ACUTE KIDNEY INJURY) (HCC): ICD-10-CM

## 2022-12-26 LAB
ANION GAP SERPL CALCULATED.3IONS-SCNC: 7 MMOL/L (ref 4–13)
BUN SERPL-MCNC: 19 MG/DL (ref 5–25)
CALCIUM SERPL-MCNC: 8.9 MG/DL (ref 8.3–10.1)
CHLORIDE SERPL-SCNC: 91 MMOL/L (ref 96–108)
CO2 SERPL-SCNC: 34 MMOL/L (ref 21–32)
CREAT SERPL-MCNC: 0.94 MG/DL (ref 0.6–1.3)
GFR SERPL CREATININE-BSD FRML MDRD: 84 ML/MIN/1.73SQ M
GLUCOSE P FAST SERPL-MCNC: 215 MG/DL (ref 65–99)
POTASSIUM SERPL-SCNC: 4.8 MMOL/L (ref 3.5–5.3)
SODIUM SERPL-SCNC: 132 MMOL/L (ref 135–147)

## 2022-12-27 NOTE — RESULT ENCOUNTER NOTE
BMP reviewed:  this was to f/u on mild ARAM  Cr has improved  [place] clinical:  Please call the patient to let them know I reviewed his labs from yesterday and kidney function is improved      Thanks,  Sulema Lopes

## 2023-01-10 ENCOUNTER — TELEPHONE (OUTPATIENT)
Dept: PULMONOLOGY | Facility: CLINIC | Age: 68
End: 2023-01-10

## 2023-01-10 DIAGNOSIS — J44.9 COPD, VERY SEVERE (HCC): ICD-10-CM

## 2023-01-17 ENCOUNTER — OFFICE VISIT (OUTPATIENT)
Dept: PULMONOLOGY | Facility: CLINIC | Age: 68
End: 2023-01-17

## 2023-01-17 ENCOUNTER — DOCUMENTATION (OUTPATIENT)
Dept: PULMONOLOGY | Facility: CLINIC | Age: 68
End: 2023-01-17

## 2023-01-17 VITALS
BODY MASS INDEX: 19.51 KG/M2 | RESPIRATION RATE: 18 BRPM | DIASTOLIC BLOOD PRESSURE: 64 MMHG | WEIGHT: 106 LBS | SYSTOLIC BLOOD PRESSURE: 108 MMHG | TEMPERATURE: 97 F | HEART RATE: 91 BPM | HEIGHT: 62 IN | OXYGEN SATURATION: 92 %

## 2023-01-17 DIAGNOSIS — J43.9 PULMONARY EMPHYSEMA, UNSPECIFIED EMPHYSEMA TYPE (HCC): Primary | Chronic | ICD-10-CM

## 2023-01-17 DIAGNOSIS — G47.33 OSA AND COPD OVERLAP SYNDROME (HCC): Chronic | ICD-10-CM

## 2023-01-17 DIAGNOSIS — J96.11 CHRONIC HYPOXEMIC RESPIRATORY FAILURE (HCC): Chronic | ICD-10-CM

## 2023-01-17 DIAGNOSIS — Z87.891 FORMER TOBACCO USE: Chronic | ICD-10-CM

## 2023-01-17 DIAGNOSIS — R91.8 PULMONARY NODULES/LESIONS, MULTIPLE: ICD-10-CM

## 2023-01-17 DIAGNOSIS — J44.9 OSA AND COPD OVERLAP SYNDROME (HCC): Chronic | ICD-10-CM

## 2023-01-17 RX ORDER — BUDESONIDE 0.5 MG/2ML
0.5 INHALANT ORAL 2 TIMES DAILY
Qty: 120 ML | Refills: 11 | Status: SHIPPED | OUTPATIENT
Start: 2023-01-17 | End: 2023-01-19 | Stop reason: SDUPTHER

## 2023-01-17 RX ORDER — FORMOTEROL FUMARATE 20 UG/2ML
20 SOLUTION RESPIRATORY (INHALATION) 2 TIMES DAILY
Qty: 120 ML | Refills: 11 | Status: SHIPPED | OUTPATIENT
Start: 2023-01-17 | End: 2023-01-19 | Stop reason: SDUPTHER

## 2023-01-17 NOTE — ASSESSMENT & PLAN NOTE
Most recent imaging shows stable pulmonary nodules  With his current physical health, I would hold off on any further imaging at this time

## 2023-01-17 NOTE — ASSESSMENT & PLAN NOTE
He has continued to have significant decline in his performance status  Today he appears fatigued, winded with simply sitting in a wheelchair  We will change Trelegy to an all nebulized regimen including Pulmicort/Perforomist twice daily, DuoNeb 4 times daily  He will maintain on prednisone 5 mg daily  He has completed pulmonary rehabilitation in the past without significant improvement  We have a long discussion today in which I discussed that he would be appropriate for home hospice  He does not feel that he is at that point, however states he would like to die at home  We briefly discussed palliative care, and the use of low-dose morphine to help with air hunger, at this time he is not interested  I provided him and his wife with a copy of 5 wishes booklet, will discuss at next office visit  In the meantime I have encouraged his wife to reach out to [address] team to see if there is any potential with their Medicare as well as [employer] to get back with palliative care without a significant co-pay

## 2023-01-17 NOTE — ASSESSMENT & PLAN NOTE
He has not been using his BiPAP as consistently as he was previously  At this point due to his poor compliance, they have expressed that he will need to pay for his machine  I have asked them to reach out to [facility] and explained that he will work on improved compliance  He does not feel that it gives him enough air, and at this point in his illness he would be appropriate for a trilogy machine  He would need a repeat ABG  He is not sure he wants to change machines, he will think about this and get back to me

## 2023-01-17 NOTE — PATIENT INSTRUCTIONS
Stop Trelegy    Nebulizer regimen:    -Pulmicort/budesonide twice daily AM/PM  -Formoterol/perforomist twice daily AM/PM   (You can mix above 2 medications in one nebulizer)  -Ipratropium/Albuterol 4 times daily    Morning: Ipratropium/Albuterol followed by Pulmicort/budesonide and Formoterol/perforomist mixed together    Daytime - Ipratropium/Albuterol 1-2 times spread out    Evening - Ipratropium/Albuterol followed by Pulmicort/budesonide and Formoterol/perforomist mixed together

## 2023-01-17 NOTE — PROGRESS NOTES
Assessment:    Chronic hypoxemic respiratory failure (HCC)  Continue supplemental oxygen to maintain sats greater than 88%  Currently using 3 L nasal cannula  Chronic obstructive pulmonary disease (HCC)  He has continued to have significant decline in his performance status  Today he appears fatigued, winded with simply sitting in a wheelchair  We will change Trelegy to an all nebulized regimen including Pulmicort/Perforomist twice daily, DuoNeb 4 times daily  He will maintain on prednisone 5 mg daily  He has completed pulmonary rehabilitation in the past without significant improvement  We have a long discussion today in which I discussed that he would be appropriate for home hospice  He does not feel that he is at that point, however states he would like to die at home  We briefly discussed palliative care, and the use of low-dose morphine to help with air hunger, at this time he is not interested  I provided him and his wife with a copy of 5 wishes booklet, will discuss at next office visit  In the meantime I have encouraged his wife to reach out to [address] [place] [address] team to see if there is any potential with their Medicare as well as [employer] to get back with palliative care without a significant co-pay  KEVIN and COPD overlap syndrome ([facility] [address] )  He has not been using his BiPAP as consistently as he was previously  At this point due to his poor compliance, they have expressed that he will need to pay for his machine  I have asked them to reach out to [facility] and explained that he will work on improved compliance  He does not feel that it gives him enough air, and at this point in his illness he would be appropriate for a trilogy machine  He would need a repeat ABG  He is not sure he wants to change machines, he will think about this and get back to me  Pulmonary nodules/lesions, multiple  Most recent imaging shows stable pulmonary nodules    With his current physical health, I would hold off on any further imaging at this time  Plan:    Diagnoses and all orders for this visit:    Pulmonary emphysema, unspecified emphysema type ([place] [address] )  -     budesonide (Pulmicort) 0 5 mg/2 mL nebulizer solution; Take 2 mL (0 5 mg total) by nebulization 2 (two) times a day Rinse mouth after use  -     formoterol (PERFOROMIST) 20 MCG/2ML nebulizer solution; Take 2 mL (20 mcg total) by nebulization 2 (two) times a day  -     Nebulizer Supplies    Chronic hypoxemic respiratory failure (HCC)    KEVIN and COPD overlap syndrome ([facility] )    Former tobacco use    Pulmonary nodules/lesions, multiple    Other orders  -     Nebulizer Set Disposable Package + 2 more items      Counseling / Coordination of Care  Time spent for care:   45 Minutes - More than 50% of the time spent on counseling and coordination of care as described above      Follow-up: 2-3 months      HPI:  Overall the patient has noticed a significant decline in his health over the past couple of months  His wife is present and states that he is sleeping a lot more, and has had an increase in his symptoms  He has severe shortness of breath with any movement  He does have minimal cough  He has only been using his BIpap about 4 hours per night - but is using it every night  He tells me that he wakes up at night to pee, it is such a hassle for him to take off the equipment, and then placed the equipment back on that he usually does not place it back on  Due to financial constraints, he has been unable to follow with palliative care because of co-pay  He is frustrated that Trelegy cost $300 a month and he is unable to afford it after this month  He is frustrated that he is feeling so poorly, feels like he cannot get better, but cannot afford some interventions per above   " I would not wish this on my worst enemy  This is a nightmare "      Review of Systems   Constitutional: Positive for activity change and fatigue     Respiratory: Positive for shortness of breath  Cardiovascular: Positive for leg swelling  Musculoskeletal: Positive for back pain and gait problem  Psychiatric/Behavioral: Positive for sleep disturbance  The following portions of the patient's history were reviewed and updated as appropriate: allergies, current medications, past family history, past medical history, past social history, past surgical history and problem list     VITALS:  Vitals:    01/17/23 0841 01/17/23 0844   BP: 108/64    BP Location: Left arm    Patient Position: Sitting    Cuff Size: Adult    Pulse: 91    Resp: 18    Temp: (!) 97 °F (36 1 °C)    TempSrc: Temporal    SpO2: 92% 92%   Weight: 48 1 kg (106 lb)    Height: 5' 2" (1 575 m)        Physical Exam  General:  Patient is awake, chronically ill-appearing with resting tachypnea  Neck: No JVD  CV:  Regular, +S1 and S2, No murmurs, gallops or rubs appreciated  Lungs: Greatly diminished breath sounds in all lung fields without wheeze, rales or rhonchi  Abdomen: Soft, +BS, Non-tender, non-distended  Extremities: No clubbing, cyanosis or edema  Neuro: No focal deficits        Diagnostic Testing:    CBC:  Lab Results   Component Value Date    WBC 8 55 08/26/2022    HGB 12 5 08/26/2022    HCT 40 2 08/26/2022     (H) 08/26/2022     08/26/2022         BMP:   Lab Results   Component Value Date     08/17/2015    K 4 8 12/26/2022    CL 91 (L) 12/26/2022    CO2 34 (H) 12/26/2022    ANIONGAP 6 08/17/2015    BUN 19 12/26/2022    CREATININE 0 94 12/26/2022    GLUCOSE 90 08/17/2015    GLUF 215 (H) 12/26/2022    CALCIUM 8 9 12/26/2022    CORRECTEDCA 9 6 03/28/2022    AST 14 08/26/2022    ALT 18 08/26/2022    ALKPHOS 94 08/26/2022    PROT 6 8 08/17/2015    BILITOT 0 60 08/17/2015    EGFR 84 12/26/2022         Imaging studies:  CT chest 12/21/22  I have personally reviewed pertinent films in PACS  IMPRESSION:     1  Noncalcified nodules in the right upper and lower lobe without significant change    2  Mild interval improvement of the ill-defined area of nodularity/reticulation in the left lower lobe as compared to the prior study  3   Centrilobular and paraseptal emphysema  4   Possible worsening fibrotic changes in the central aspect of the right middle lobe with bronchiectasis  5   Increased loss of height of multiple thoracic vertebral bodies primarily involving the T6-T8 vertebral bodies  If there is pain and/or tenderness in this region, consider further assessment with MRI        Taty Arnett, DO

## 2023-01-18 ENCOUNTER — TELEPHONE (OUTPATIENT)
Dept: PULMONOLOGY | Facility: CLINIC | Age: 68
End: 2023-01-18

## 2023-01-18 NOTE — TELEPHONE ENCOUNTER
Pts wife has called in stating she was advised to call in and leave a message for  to return their call if any issues with receiving the nebulizer machine. Pt states they are having an issue with a cpap bill that is not allowing them to retrieve the nebulizer machine. Requesting assistance.  Please advise

## 2023-01-19 DIAGNOSIS — J43.9 PULMONARY EMPHYSEMA, UNSPECIFIED EMPHYSEMA TYPE (HCC): Chronic | ICD-10-CM

## 2023-01-19 RX ORDER — FORMOTEROL FUMARATE 20 UG/2ML
20 SOLUTION RESPIRATORY (INHALATION) 2 TIMES DAILY
Qty: 120 ML | Refills: 5 | Status: SHIPPED | OUTPATIENT
Start: 2023-01-19 | End: 2023-01-20

## 2023-01-19 RX ORDER — BUDESONIDE 0.5 MG/2ML
0.5 INHALANT ORAL 2 TIMES DAILY
Qty: 120 ML | Refills: 5 | Status: SHIPPED | OUTPATIENT
Start: 2023-01-19

## 2023-01-19 NOTE — TELEPHONE ENCOUNTER
I called and spoke to the pt's wife. They stated that [facility]. is refusing to give them his nebulizer and supplies since he owes money for his CPAP machine. I have sent a request for Araceli Brooks to sign off on the nebulizer medication.  I also faxed over the order for the nebulizer to his local pharmacy to see if he can get it from them

## 2023-01-20 ENCOUNTER — TELEPHONE (OUTPATIENT)
Dept: PALLIATIVE MEDICINE | Facility: CLINIC | Age: 68
End: 2023-01-20

## 2023-01-20 RX ORDER — FORMOTEROL FUMARATE 20 UG/2ML
20 SOLUTION RESPIRATORY (INHALATION) 2 TIMES DAILY
Qty: 120 ML | Refills: 5 | Status: SHIPPED | OUTPATIENT
Start: 2023-01-20 | End: 2023-02-19

## 2023-01-23 DIAGNOSIS — Z78.9 NEED FOR FOLLOW-UP BY SOCIAL WORKER: Primary | ICD-10-CM

## 2023-01-23 NOTE — TELEPHONE ENCOUNTER
Spoke with [employer] @ [address] is using his machine but he is not using it for the full 4 hours, every night is different on usage, some nights are 2 hours, 3 1/2 hours , but never over the 4 hours nightly required to be in compliance. Madelin will reach out to the [address] [place] [address] if there is anything we can do to try to help keep his machine. Otherwise, he will need to start the process all over again. Madelin will also look into if he qualifies,  or what we need to do to qualify him for a Trilogy machine.

## 2023-01-25 NOTE — TELEPHONE ENCOUNTER
I will forward you the qualifications for a Trilogy machine. Patient will need to be contacted about having to return his bipap. Dr. London Ruiz is out of the office at this time, we will see how she wants to pursue this issue when she comes back.

## 2023-02-02 DIAGNOSIS — Z59.9 NEED FOR FINANCIAL SUPPORT: ICD-10-CM

## 2023-02-02 DIAGNOSIS — Z74.8 ASSISTANCE NEEDED WITH TRANSPORTATION: Primary | ICD-10-CM

## 2023-02-02 SDOH — ECONOMIC STABILITY - INCOME SECURITY: PROBLEM RELATED TO HOUSING AND ECONOMIC CIRCUMSTANCES, UNSPECIFIED: Z59.9

## 2023-02-03 ENCOUNTER — PATIENT OUTREACH (OUTPATIENT)
Dept: INTERNAL MEDICINE CLINIC | Facility: CLINIC | Age: 68
End: 2023-02-03

## 2023-02-03 NOTE — PROGRESS NOTES
OP SAMICM received referral message from MOHSEN Berry r/t f/u by  for financial assistance  Chart review complete  Per chart review, pt has difficulty affording co-pay costs  Per in-basket thread, pt and wife needing resources due to his cancer diagnosis and may be needing at home hospice services  At this time, pt reports not ready for hospice services  OP MELISSA will reach out to pt  OP MELISSA called pt introducing self, role and reason for calling  Pt at this time interested in getting financial assistance for co-pays for his appts  OP MELISSA provided pt with number for financial assistance office  OP MELISSA explained to pt how financial assistance could assist him and pt will reach out with any other needs  Pt declined needing any other assistance aside from needing help paying for co-pays

## 2023-02-12 ENCOUNTER — APPOINTMENT (EMERGENCY)
Dept: RADIOLOGY | Facility: HOSPITAL | Age: 68
End: 2023-02-12

## 2023-02-12 ENCOUNTER — APPOINTMENT (INPATIENT)
Dept: NON INVASIVE DIAGNOSTICS | Facility: HOSPITAL | Age: 68
End: 2023-02-12

## 2023-02-12 ENCOUNTER — APPOINTMENT (INPATIENT)
Dept: RADIOLOGY | Facility: HOSPITAL | Age: 68
End: 2023-02-12

## 2023-02-12 ENCOUNTER — HOSPITAL ENCOUNTER (INPATIENT)
Facility: HOSPITAL | Age: 68
LOS: 16 days | End: 2023-02-28
Attending: EMERGENCY MEDICINE | Admitting: ANESTHESIOLOGY

## 2023-02-12 DIAGNOSIS — D64.9 ANEMIA: ICD-10-CM

## 2023-02-12 DIAGNOSIS — I50.23 ACUTE ON CHRONIC HFREF (HEART FAILURE WITH REDUCED EJECTION FRACTION) (HCC): ICD-10-CM

## 2023-02-12 DIAGNOSIS — I50.23 ACUTE ON CHRONIC SYSTOLIC (CONGESTIVE) HEART FAILURE (HCC): ICD-10-CM

## 2023-02-12 DIAGNOSIS — J96.00 ACUTE RESPIRATORY FAILURE (HCC): ICD-10-CM

## 2023-02-12 DIAGNOSIS — K59.00 CONSTIPATION: ICD-10-CM

## 2023-02-12 DIAGNOSIS — J44.9 COPD (CHRONIC OBSTRUCTIVE PULMONARY DISEASE) (HCC): ICD-10-CM

## 2023-02-12 DIAGNOSIS — R57.9 SHOCK (HCC): ICD-10-CM

## 2023-02-12 DIAGNOSIS — J43.9 PULMONARY EMPHYSEMA, UNSPECIFIED EMPHYSEMA TYPE (HCC): Chronic | ICD-10-CM

## 2023-02-12 DIAGNOSIS — J96.22 ACUTE ON CHRONIC RESPIRATORY FAILURE WITH HYPOXIA AND HYPERCAPNIA (HCC): Primary | ICD-10-CM

## 2023-02-12 DIAGNOSIS — I50.9 CHF (CONGESTIVE HEART FAILURE) (HCC): ICD-10-CM

## 2023-02-12 DIAGNOSIS — R33.9 URINARY RETENTION: ICD-10-CM

## 2023-02-12 DIAGNOSIS — N13.8 BPH WITH OBSTRUCTION/LOWER URINARY TRACT SYMPTOMS: Chronic | ICD-10-CM

## 2023-02-12 DIAGNOSIS — N40.1 BPH WITH OBSTRUCTION/LOWER URINARY TRACT SYMPTOMS: Chronic | ICD-10-CM

## 2023-02-12 DIAGNOSIS — J96.21 ACUTE ON CHRONIC RESPIRATORY FAILURE WITH HYPOXIA AND HYPERCAPNIA (HCC): Primary | ICD-10-CM

## 2023-02-12 PROBLEM — I50.22 CHRONIC HFREF (HEART FAILURE WITH REDUCED EJECTION FRACTION) (HCC): Chronic | Status: ACTIVE | Noted: 2022-09-12

## 2023-02-12 LAB
2HR DELTA HS TROPONIN: 10 NG/L
4HR DELTA HS TROPONIN: 11 NG/L
ALBUMIN SERPL BCP-MCNC: 2.9 G/DL (ref 3.5–5)
ALP SERPL-CCNC: 64 U/L (ref 46–116)
ALT SERPL W P-5'-P-CCNC: 24 U/L (ref 12–78)
ANION GAP SERPL CALCULATED.3IONS-SCNC: 4 MMOL/L (ref 4–13)
APICAL FOUR CHAMBER EJECTION FRACTION: 23 %
APTT PPP: 27 SECONDS (ref 23–37)
AST SERPL W P-5'-P-CCNC: 39 U/L (ref 5–45)
ATRIAL RATE: 84 BPM
B PARAP IS1001 DNA NPH QL NAA+NON-PROBE: NOT DETECTED
B PERT.PT PRMT NPH QL NAA+NON-PROBE: NOT DETECTED
BASE EX.OXY STD BLDV CALC-SCNC: 72.4 % (ref 60–80)
BASE EX.OXY STD BLDV CALC-SCNC: 78.2 % (ref 60–80)
BASE EXCESS BLDA CALC-SCNC: 13.6 MMOL/L
BASE EXCESS BLDA CALC-SCNC: 9.3 MMOL/L
BASE EXCESS BLDV CALC-SCNC: 15.7 MMOL/L
BASE EXCESS BLDV CALC-SCNC: 16.1 MMOL/L
BASOPHILS # BLD AUTO: 0.01 THOUSANDS/ÂΜL (ref 0–0.1)
BASOPHILS NFR BLD AUTO: 0 % (ref 0–1)
BILIRUB SERPL-MCNC: 0.51 MG/DL (ref 0.2–1)
BUN SERPL-MCNC: 20 MG/DL (ref 5–25)
C PNEUM DNA NPH QL NAA+NON-PROBE: NOT DETECTED
CALCIUM ALBUM COR SERPL-MCNC: 9.1 MG/DL (ref 8.3–10.1)
CALCIUM SERPL-MCNC: 8.2 MG/DL (ref 8.3–10.1)
CARDIAC TROPONIN I PNL SERPL HS: 42 NG/L
CARDIAC TROPONIN I PNL SERPL HS: 52 NG/L
CARDIAC TROPONIN I PNL SERPL HS: 53 NG/L
CHLORIDE SERPL-SCNC: 86 MMOL/L (ref 96–108)
CHLORIDE UR-SCNC: <10 MMOL/L
CO2 SERPL-SCNC: 39 MMOL/L (ref 21–32)
CREAT SERPL-MCNC: 0.56 MG/DL (ref 0.6–1.3)
CREAT UR-MCNC: 114 MG/DL
EOSINOPHIL # BLD AUTO: 0.01 THOUSAND/ÂΜL (ref 0–0.61)
EOSINOPHIL NFR BLD AUTO: 0 % (ref 0–6)
ERYTHROCYTE [DISTWIDTH] IN BLOOD BY AUTOMATED COUNT: 12.2 % (ref 11.6–15.1)
FERRITIN SERPL-MCNC: 209 NG/ML (ref 8–388)
FLUAV RNA NPH QL NAA+NON-PROBE: NOT DETECTED
FLUBV RNA NPH QL NAA+NON-PROBE: NOT DETECTED
GFR SERPL CREATININE-BSD FRML MDRD: 108 ML/MIN/1.73SQ M
GLUCOSE SERPL-MCNC: 100 MG/DL (ref 65–140)
GLUCOSE SERPL-MCNC: 114 MG/DL (ref 65–140)
HADV DNA NPH QL NAA+NON-PROBE: NOT DETECTED
HCO3 BLDA-SCNC: 40.1 MMOL/L (ref 22–28)
HCO3 BLDA-SCNC: 42.4 MMOL/L (ref 22–28)
HCO3 BLDV-SCNC: 41.8 MMOL/L (ref 24–30)
HCO3 BLDV-SCNC: 42.3 MMOL/L (ref 24–30)
HCOV 229E RNA NPH QL NAA+NON-PROBE: NOT DETECTED
HCOV HKU1 RNA NPH QL NAA+NON-PROBE: NOT DETECTED
HCOV NL63 RNA NPH QL NAA+NON-PROBE: NOT DETECTED
HCOV OC43 RNA NPH QL NAA+NON-PROBE: NOT DETECTED
HCT VFR BLD AUTO: 28.5 % (ref 36.5–49.3)
HGB BLD-MCNC: 8.9 G/DL (ref 12–17)
HMPV RNA NPH QL NAA+NON-PROBE: NOT DETECTED
HOROWITZ INDEX BLDA+IHG-RTO: 40 MM[HG]
HPIV1 RNA NPH QL NAA+NON-PROBE: NOT DETECTED
HPIV2 RNA NPH QL NAA+NON-PROBE: NOT DETECTED
HPIV3 RNA NPH QL NAA+NON-PROBE: NOT DETECTED
HPIV4 RNA NPH QL NAA+NON-PROBE: NOT DETECTED
IMM GRANULOCYTES # BLD AUTO: 0.03 THOUSAND/UL (ref 0–0.2)
IMM GRANULOCYTES NFR BLD AUTO: 0 % (ref 0–2)
INR PPP: 0.93 (ref 0.84–1.19)
IRON SATN MFR SERPL: 12 % (ref 20–50)
IRON SERPL-MCNC: 32 UG/DL (ref 65–175)
L PNEUMO1 AG UR QL IA.RAPID: NEGATIVE
LAAS-AP4: 21.5 CM2
LACTATE SERPL-SCNC: 1.2 MMOL/L (ref 0.5–2)
LDH SERPL-CCNC: 226 U/L (ref 81–234)
LEFT VENTRICLE DIASTOLIC VOLUME (MOD BIPLANE): 222 ML
LEFT VENTRICLE SYSTOLIC VOLUME (MOD BIPLANE): 161 ML
LYMPHOCYTES # BLD AUTO: 0.71 THOUSANDS/ÂΜL (ref 0.6–4.47)
LYMPHOCYTES NFR BLD AUTO: 10 % (ref 14–44)
LYMPHOCYTES NFR BLD: 1 % (ref 14–44)
M PNEUMO DNA NPH QL NAA+NON-PROBE: NOT DETECTED
MCH RBC QN AUTO: 31.8 PG (ref 26.8–34.3)
MCHC RBC AUTO-ENTMCNC: 31.2 G/DL (ref 31.4–37.4)
MCV RBC AUTO: 102 FL (ref 82–98)
MONOCYTES # BLD AUTO: 1.21 THOUSAND/ÂΜL (ref 0.17–1.22)
MONOCYTES NFR BLD AUTO: 17 % (ref 4–12)
MONOCYTES NFR BLD AUTO: 5 % (ref 4–12)
NEUTROPHILS # BLD AUTO: 5.27 THOUSANDS/ÂΜL (ref 1.85–7.62)
NEUTS SEG NFR BLD AUTO: 73 % (ref 43–75)
NEUTS SEG NFR BLD AUTO: 94 % (ref 45–77)
NRBC BLD AUTO-RTO: 0 /100 WBCS
NT-PROBNP SERPL-MCNC: 6786 PG/ML
O2 CT BLDA-SCNC: 14.1 ML/DL (ref 16–23)
O2 CT BLDA-SCNC: 15.8 ML/DL (ref 16–23)
O2 CT BLDV-SCNC: 10 ML/DL
O2 CT BLDV-SCNC: 10.8 ML/DL
OSMOLALITY UR/SERPL-RTO: 276 MMOL/KG (ref 282–298)
OSMOLALITY UR: 502 MMOL/KG
OXYHGB MFR BLDA: 91.3 % (ref 94–97)
OXYHGB MFR BLDA: 97.6 % (ref 94–97)
P AXIS: 74 DEGREES
PCO2 BLDA: 103.1 MM HG (ref 36–44)
PCO2 BLDA: 81.9 MM HG (ref 36–44)
PCO2 BLDV: 60.2 MM HG (ref 42–50)
PCO2 BLDV: 61.7 MM HG (ref 42–50)
PEEP RESPIRATORY: 6 CM[H2O]
PH BLDA: 7.21 [PH] (ref 7.35–7.45)
PH BLDA: 7.33 [PH] (ref 7.35–7.45)
PH BLDV: 7.45 [PH] (ref 7.3–7.4)
PH BLDV: 7.46 [PH] (ref 7.3–7.4)
PLATELET # BLD AUTO: 204 THOUSANDS/UL (ref 149–390)
PLATELET # BLD AUTO: 220 THOUSANDS/UL (ref 149–390)
PLATELET BLD QL SMEAR: ADEQUATE
PMV BLD AUTO: 9.7 FL (ref 8.9–12.7)
PMV BLD AUTO: 9.8 FL (ref 8.9–12.7)
PO2 BLDA: 412 MM HG (ref 75–129)
PO2 BLDA: 70 MM HG (ref 75–129)
PO2 BLDV: 37.6 MM HG (ref 35–45)
PO2 BLDV: 41.5 MM HG (ref 35–45)
POIKILOCYTOSIS BLD QL SMEAR: PRESENT
POTASSIUM SERPL-SCNC: 4 MMOL/L (ref 3.5–5.3)
PR INTERVAL: 146 MS
PROCALCITONIN SERPL-MCNC: 0.05 NG/ML
PROT SERPL-MCNC: 5.3 G/DL (ref 6.4–8.4)
PROTHROMBIN TIME: 12.6 SECONDS (ref 11.6–14.5)
QRS AXIS: 85 DEGREES
QRSD INTERVAL: 132 MS
QT INTERVAL: 378 MS
QTC INTERVAL: 446 MS
RA PRESSURE ESTIMATED: 15 MMHG
RBC # BLD AUTO: 2.8 MILLION/UL (ref 3.88–5.62)
RETICS # AUTO: ABNORMAL 10*3/UL (ref 14356–105094)
RETICS # CALC: 1.92 % (ref 0.37–1.87)
RIGHT ATRIUM AREA SYSTOLE A4C: 17.3 CM2
RIGHT VENTRICLE ID DIMENSION: 4.7 CM
RSV RNA NPH QL NAA+NON-PROBE: NOT DETECTED
RV PSP: 38 MMHG
RV+EV RNA NPH QL NAA+NON-PROBE: NOT DETECTED
S PNEUM AG UR QL: NEGATIVE
SARS-COV-2 RNA NPH QL NAA+NON-PROBE: NOT DETECTED
SIMV VENT INSPIRED AIR FIO2: 40
SIMV VENT PEEP: 6
SIMV VENT TIDAL VOLUME: 350
SIMV VENT: ABNORMAL
SL CV LV EF: 20
SODIUM 24H UR-SCNC: <5 MOL/L
SODIUM SERPL-SCNC: 129 MMOL/L (ref 135–147)
SPECIMEN SOURCE: ABNORMAL
SPECIMEN SOURCE: ABNORMAL
T WAVE AXIS: -45 DEGREES
TIBC SERPL-MCNC: 269 UG/DL (ref 250–450)
TOTAL CELLS COUNTED SPEC: 100
TR MAX PG: 23 MMHG
TR PEAK VELOCITY: 2.4 M/S
TRICUSPID ANNULAR PLANE SYSTOLIC EXCURSION: 2.3 CM
TRICUSPID VALVE PEAK REGURGITATION VELOCITY: 2.4 M/S
VENT AC: 20
VENT- AC: AC
VENTRICULAR RATE: 84 BPM
VT SETTING VENT: 350 ML
WBC # BLD AUTO: 7.24 THOUSAND/UL (ref 4.31–10.16)

## 2023-02-12 PROCEDURE — 4A133B1 MONITORING OF ARTERIAL PRESSURE, PERIPHERAL, PERCUTANEOUS APPROACH: ICD-10-PCS | Performed by: EMERGENCY MEDICINE

## 2023-02-12 PROCEDURE — 5A1945Z RESPIRATORY VENTILATION, 24-96 CONSECUTIVE HOURS: ICD-10-PCS | Performed by: EMERGENCY MEDICINE

## 2023-02-12 PROCEDURE — 02HV33Z INSERTION OF INFUSION DEVICE INTO SUPERIOR VENA CAVA, PERCUTANEOUS APPROACH: ICD-10-PCS | Performed by: INTERNAL MEDICINE

## 2023-02-12 PROCEDURE — 4A133J1 MONITORING OF ARTERIAL PULSE, PERIPHERAL, PERCUTANEOUS APPROACH: ICD-10-PCS | Performed by: EMERGENCY MEDICINE

## 2023-02-12 PROCEDURE — 03HY32Z INSERTION OF MONITORING DEVICE INTO UPPER ARTERY, PERCUTANEOUS APPROACH: ICD-10-PCS | Performed by: EMERGENCY MEDICINE

## 2023-02-12 RX ORDER — FORMOTEROL FUMARATE 20 UG/2ML
20 SOLUTION RESPIRATORY (INHALATION)
Status: DISCONTINUED | OUTPATIENT
Start: 2023-02-12 | End: 2023-02-28 | Stop reason: HOSPADM

## 2023-02-12 RX ORDER — MIDAZOLAM HYDROCHLORIDE 1 MG/ML
1 INJECTION INTRAMUSCULAR; INTRAVENOUS ONCE
Status: COMPLETED | OUTPATIENT
Start: 2023-02-12 | End: 2023-02-12

## 2023-02-12 RX ORDER — FENTANYL CITRATE 50 UG/ML
50 INJECTION, SOLUTION INTRAMUSCULAR; INTRAVENOUS
Status: DISCONTINUED | OUTPATIENT
Start: 2023-02-12 | End: 2023-02-18

## 2023-02-12 RX ORDER — KETAMINE HYDROCHLORIDE 100 MG/ML
1 INJECTION, SOLUTION INTRAMUSCULAR; INTRAVENOUS ONCE
Status: COMPLETED | OUTPATIENT
Start: 2023-02-12 | End: 2023-02-12

## 2023-02-12 RX ORDER — METHYLPREDNISOLONE SODIUM SUCCINATE 40 MG/ML
40 INJECTION, POWDER, LYOPHILIZED, FOR SOLUTION INTRAMUSCULAR; INTRAVENOUS EVERY 8 HOURS SCHEDULED
Status: DISCONTINUED | OUTPATIENT
Start: 2023-02-12 | End: 2023-02-18

## 2023-02-12 RX ORDER — FENTANYL CITRATE 50 UG/ML
1 INJECTION, SOLUTION INTRAMUSCULAR; INTRAVENOUS ONCE
Status: COMPLETED | OUTPATIENT
Start: 2023-02-12 | End: 2023-02-12

## 2023-02-12 RX ORDER — KETAMINE HYDROCHLORIDE 50 MG/ML
100 INJECTION, SOLUTION, CONCENTRATE INTRAMUSCULAR; INTRAVENOUS ONCE
Status: COMPLETED | OUTPATIENT
Start: 2023-02-12 | End: 2023-02-12

## 2023-02-12 RX ORDER — ENOXAPARIN SODIUM 100 MG/ML
40 INJECTION SUBCUTANEOUS DAILY
Status: DISCONTINUED | OUTPATIENT
Start: 2023-02-12 | End: 2023-02-28 | Stop reason: HOSPADM

## 2023-02-12 RX ORDER — ALBUMIN (HUMAN) 12.5 G/50ML
25 SOLUTION INTRAVENOUS ONCE
Status: COMPLETED | OUTPATIENT
Start: 2023-02-12 | End: 2023-02-12

## 2023-02-12 RX ORDER — GUAIFENESIN 600 MG/1
1200 TABLET, EXTENDED RELEASE ORAL EVERY 12 HOURS SCHEDULED
Status: DISCONTINUED | OUTPATIENT
Start: 2023-02-12 | End: 2023-02-12

## 2023-02-12 RX ORDER — BUDESONIDE 0.5 MG/2ML
0.5 INHALANT ORAL
Status: DISCONTINUED | OUTPATIENT
Start: 2023-02-12 | End: 2023-02-28 | Stop reason: HOSPADM

## 2023-02-12 RX ORDER — ASPIRIN 81 MG/1
81 TABLET, CHEWABLE ORAL DAILY
Status: DISCONTINUED | OUTPATIENT
Start: 2023-02-12 | End: 2023-02-28 | Stop reason: HOSPADM

## 2023-02-12 RX ORDER — FENTANYL CITRATE 50 UG/ML
50 INJECTION, SOLUTION INTRAMUSCULAR; INTRAVENOUS ONCE
Status: DISCONTINUED | OUTPATIENT
Start: 2023-02-12 | End: 2023-02-18

## 2023-02-12 RX ORDER — AZITHROMYCIN 500 MG/1
500 TABLET, FILM COATED ORAL EVERY 24 HOURS
Status: DISCONTINUED | OUTPATIENT
Start: 2023-02-13 | End: 2023-02-12

## 2023-02-12 RX ORDER — EPINEPHRINE 1 MG/ML
INJECTION, SOLUTION, CONCENTRATE INTRAVENOUS
Status: DISPENSED
Start: 2023-02-12 | End: 2023-02-12

## 2023-02-12 RX ORDER — FENTANYL CITRATE 50 UG/ML
100 INJECTION, SOLUTION INTRAMUSCULAR; INTRAVENOUS ONCE
Status: COMPLETED | OUTPATIENT
Start: 2023-02-12 | End: 2023-02-12

## 2023-02-12 RX ORDER — PROPOFOL 10 MG/ML
5-50 INJECTION, EMULSION INTRAVENOUS
Status: DISCONTINUED | OUTPATIENT
Start: 2023-02-12 | End: 2023-02-16

## 2023-02-12 RX ORDER — CHLORHEXIDINE GLUCONATE 0.12 MG/ML
15 RINSE ORAL EVERY 12 HOURS SCHEDULED
Status: DISCONTINUED | OUTPATIENT
Start: 2023-02-12 | End: 2023-02-12

## 2023-02-12 RX ORDER — MIDAZOLAM HYDROCHLORIDE 2 MG/2ML
2 INJECTION, SOLUTION INTRAMUSCULAR; INTRAVENOUS ONCE
Status: COMPLETED | OUTPATIENT
Start: 2023-02-12 | End: 2023-02-12

## 2023-02-12 RX ORDER — AZITHROMYCIN 250 MG/1
500 TABLET, FILM COATED ORAL EVERY 24 HOURS
Status: COMPLETED | OUTPATIENT
Start: 2023-02-13 | End: 2023-02-14

## 2023-02-12 RX ORDER — LEVALBUTEROL 1.25 MG/.5ML
1.25 SOLUTION, CONCENTRATE RESPIRATORY (INHALATION)
Status: DISCONTINUED | OUTPATIENT
Start: 2023-02-12 | End: 2023-02-28 | Stop reason: HOSPADM

## 2023-02-12 RX ORDER — TERBUTALINE SULFATE 1 MG/ML
1 INJECTION, SOLUTION SUBCUTANEOUS ONCE
Status: COMPLETED | OUTPATIENT
Start: 2023-02-12 | End: 2023-02-12

## 2023-02-12 RX ORDER — ERGOCALCIFEROL 1.25 MG/1
50000 CAPSULE ORAL
Status: DISCONTINUED | OUTPATIENT
Start: 2023-02-12 | End: 2023-02-28 | Stop reason: HOSPADM

## 2023-02-12 RX ORDER — METHYLPREDNISOLONE SOD SUCC 125 MG
1 VIAL (EA) INJECTION ONCE
Status: COMPLETED | OUTPATIENT
Start: 2023-02-12 | End: 2023-02-12

## 2023-02-12 RX ORDER — SODIUM CHLORIDE FOR INHALATION 0.9 %
3 VIAL, NEBULIZER (ML) INHALATION ONCE
Status: COMPLETED | OUTPATIENT
Start: 2023-02-12 | End: 2023-02-12

## 2023-02-12 RX ORDER — MIDAZOLAM HYDROCHLORIDE 2 MG/2ML
INJECTION, SOLUTION INTRAMUSCULAR; INTRAVENOUS
Status: DISPENSED
Start: 2023-02-12 | End: 2023-02-13

## 2023-02-12 RX ORDER — EPINEPHRINE 0.1 MG/ML
1 SYRINGE (ML) INJECTION ONCE
Status: COMPLETED | OUTPATIENT
Start: 2023-02-12 | End: 2023-02-12

## 2023-02-12 RX ORDER — FUROSEMIDE 10 MG/ML
40 INJECTION INTRAMUSCULAR; INTRAVENOUS ONCE
Status: COMPLETED | OUTPATIENT
Start: 2023-02-12 | End: 2023-02-12

## 2023-02-12 RX ORDER — ERYTHROMYCIN 5 MG/G
0.5 OINTMENT OPHTHALMIC ONCE
Status: DISCONTINUED | OUTPATIENT
Start: 2023-02-12 | End: 2023-02-12

## 2023-02-12 RX ORDER — LEVALBUTEROL 1.25 MG/.5ML
1.25 SOLUTION, CONCENTRATE RESPIRATORY (INHALATION)
Status: DISCONTINUED | OUTPATIENT
Start: 2023-02-12 | End: 2023-02-12

## 2023-02-12 RX ORDER — SODIUM CHLORIDE FOR INHALATION 0.9 %
3 VIAL, NEBULIZER (ML) INHALATION
Status: DISCONTINUED | OUTPATIENT
Start: 2023-02-12 | End: 2023-02-12

## 2023-02-12 RX ORDER — MAGNESIUM SULFATE HEPTAHYDRATE 40 MG/ML
1 INJECTION, SOLUTION INTRAVENOUS ONCE
Status: COMPLETED | OUTPATIENT
Start: 2023-02-12 | End: 2023-02-12

## 2023-02-12 RX ORDER — PRAVASTATIN SODIUM 20 MG
80 TABLET ORAL
Status: DISCONTINUED | OUTPATIENT
Start: 2023-02-12 | End: 2023-02-28 | Stop reason: HOSPADM

## 2023-02-12 RX ORDER — CHLORHEXIDINE GLUCONATE 0.12 MG/ML
15 RINSE ORAL EVERY 12 HOURS SCHEDULED
Status: DISCONTINUED | OUTPATIENT
Start: 2023-02-12 | End: 2023-02-28 | Stop reason: HOSPADM

## 2023-02-12 RX ADMIN — METHYLPREDNISOLONE SODIUM SUCCINATE 40 MG: 40 INJECTION, POWDER, FOR SOLUTION INTRAMUSCULAR; INTRAVENOUS at 08:25

## 2023-02-12 RX ADMIN — MIDAZOLAM 2 MG: 1 INJECTION INTRAMUSCULAR; INTRAVENOUS at 16:00

## 2023-02-12 RX ADMIN — BUDESONIDE 0.5 MG: 0.5 INHALANT ORAL at 08:29

## 2023-02-12 RX ADMIN — FORMOTEROL FUMARATE DIHYDRATE 20 MCG: 20 SOLUTION RESPIRATORY (INHALATION) at 20:12

## 2023-02-12 RX ADMIN — FENTANYL CITRATE 50 MCG: 50 INJECTION INTRAMUSCULAR; INTRAVENOUS at 15:59

## 2023-02-12 RX ADMIN — CEFTRIAXONE SODIUM 2000 MG: 10 INJECTION, POWDER, FOR SOLUTION INTRAVENOUS at 06:40

## 2023-02-12 RX ADMIN — IPRATROPIUM BROMIDE 0.5 MG: 0.5 SOLUTION RESPIRATORY (INHALATION) at 13:12

## 2023-02-12 RX ADMIN — METHYLPREDNISOLONE SODIUM SUCCINATE 40 MG: 40 INJECTION, POWDER, FOR SOLUTION INTRAMUSCULAR; INTRAVENOUS at 14:14

## 2023-02-12 RX ADMIN — AZITHROMYCIN 500 MG: 500 INJECTION, POWDER, LYOPHILIZED, FOR SOLUTION INTRAVENOUS at 08:30

## 2023-02-12 RX ADMIN — ENOXAPARIN SODIUM 40 MG: 40 INJECTION SUBCUTANEOUS at 08:30

## 2023-02-12 RX ADMIN — PROPOFOL 20 MCG/KG/MIN: 10 INJECTION, EMULSION INTRAVENOUS at 05:16

## 2023-02-12 RX ADMIN — PRAVASTATIN SODIUM 80 MG: 20 TABLET ORAL at 16:24

## 2023-02-12 RX ADMIN — METHYLPREDNISOLONE SODIUM SUCCINATE 40 MG: 40 INJECTION, POWDER, FOR SOLUTION INTRAMUSCULAR; INTRAVENOUS at 21:16

## 2023-02-12 RX ADMIN — CHLORHEXIDINE GLUCONATE 0.12% ORAL RINSE 15 ML: 1.2 LIQUID ORAL at 08:25

## 2023-02-12 RX ADMIN — IPRATROPIUM BROMIDE 1 MG: 0.5 SOLUTION RESPIRATORY (INHALATION) at 06:32

## 2023-02-12 RX ADMIN — IPRATROPIUM BROMIDE 0.5 MG: 0.5 SOLUTION RESPIRATORY (INHALATION) at 08:29

## 2023-02-12 RX ADMIN — IPRATROPIUM BROMIDE 0.5 MG: 0.5 SOLUTION RESPIRATORY (INHALATION) at 20:13

## 2023-02-12 RX ADMIN — ASPIRIN 81 MG CHEWABLE TABLET 81 MG: 81 TABLET CHEWABLE at 08:30

## 2023-02-12 RX ADMIN — ALBUMIN (HUMAN) 25 G: 0.25 INJECTION, SOLUTION INTRAVENOUS at 17:14

## 2023-02-12 RX ADMIN — BUDESONIDE 0.5 MG: 0.5 INHALANT ORAL at 20:13

## 2023-02-12 RX ADMIN — ALBUTEROL SULFATE 10 MG: 2.5 SOLUTION RESPIRATORY (INHALATION) at 06:00

## 2023-02-12 RX ADMIN — FUROSEMIDE 40 MG: 10 INJECTION, SOLUTION INTRAMUSCULAR; INTRAVENOUS at 08:26

## 2023-02-12 RX ADMIN — PROPOFOL 20 MCG/KG/MIN: 10 INJECTION, EMULSION INTRAVENOUS at 17:21

## 2023-02-12 RX ADMIN — CHLORHEXIDINE GLUCONATE 0.12% ORAL RINSE 15 ML: 1.2 LIQUID ORAL at 20:56

## 2023-02-12 RX ADMIN — ISODIUM CHLORIDE 3 ML: 0.03 SOLUTION RESPIRATORY (INHALATION) at 06:00

## 2023-02-12 RX ADMIN — PROPOFOL 20 MCG/KG/MIN: 10 INJECTION, EMULSION INTRAVENOUS at 08:17

## 2023-02-12 RX ADMIN — NOREPINEPHRINE BITARTRATE 10 MCG/MIN: 1 INJECTION, SOLUTION, CONCENTRATE INTRAVENOUS at 17:21

## 2023-02-12 RX ADMIN — Medication 20 MG: at 08:30

## 2023-02-12 RX ADMIN — PERFLUTREN 0.8 ML/MIN: 6.52 INJECTION, SUSPENSION INTRAVENOUS at 09:20

## 2023-02-12 RX ADMIN — FENTANYL CITRATE 100 MCG: 50 INJECTION INTRAMUSCULAR; INTRAVENOUS at 05:16

## 2023-02-12 RX ADMIN — LEVALBUTEROL HYDROCHLORIDE 1.25 MG: 1.25 SOLUTION, CONCENTRATE RESPIRATORY (INHALATION) at 20:13

## 2023-02-12 RX ADMIN — NOREPINEPHRINE BITARTRATE 5 MCG/MIN: 1 INJECTION, SOLUTION, CONCENTRATE INTRAVENOUS at 08:13

## 2023-02-12 RX ADMIN — EPINEPHRINE 5 MCG/MIN: 1 INJECTION INTRAMUSCULAR; INTRAVENOUS; SUBCUTANEOUS at 08:14

## 2023-02-12 RX ADMIN — LEVALBUTEROL 1.25 MG: 1.25 SOLUTION, CONCENTRATE RESPIRATORY (INHALATION) at 08:29

## 2023-02-12 RX ADMIN — KETAMINE HYDROCHLORIDE 100 MG: 50 INJECTION, SOLUTION INTRAMUSCULAR; INTRAVENOUS at 05:15

## 2023-02-12 RX ADMIN — LEVALBUTEROL HYDROCHLORIDE 1.25 MG: 1.25 SOLUTION, CONCENTRATE RESPIRATORY (INHALATION) at 13:12

## 2023-02-12 NOTE — H&P
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%), heart failure with reduced ejection fraction (EF 40%), pulmonary nodules, protein-calorie malnutrition, hyperlipidemia, gastroesophageal reflux, impaired fasting glucose, osteoarthritis, osteoporosis, vitamin D deficiency, chronic hypoxemic and hypercapnic respiratory failure, benign prostate hyperplasia, prostate cancer, and former tobacco (105 PYH; quit 2012) abuse presents to [facility] due to shortness of breath  History obtained from ED documentation, chart review, and patient's spouse due to acute clinical situation  Per above the patient's symptoms have been starting over the past 1-2 days  He had been more drowsy than his baseline per wife  Falling asleep in his wheelchair  He was complaining of shortness of breath  The day prior to arrival he was hallucinating stating "something about a blue light" overnight he was in severe respiratory distress and was unable to speak  Wife became concerned and called 911  EMS arrival found the patient in severe respiratory distress and he was subsequently intubated in the field  Wife denies recent sick contacts, fever at home, no coughing, no sputum production  Wheezing at home  Short of breath  History obtained from spouse, chart review and unobtainable from patient due to respiratory failure and intubation/sedation   -------------------------------------------------------------------------------------------------------------  Assessment:    1   Acute on chronic respiratory failure with hypoxemic and hypercapnia  2  Distributive shock likely secondary to sedation  3  Ventilator Dependence  4  Acute exacerbation of COPD  5  Very severe COPD  6  Acute decompensated heart failure with reduced ejection fraction  7  Pulmonary hypertension likely group 2 and group 3 disease  8  Hyponatremia  9  KEVIN and COPD overlap syndrome  10  Protein-calorie Malnutrition  11  Macrocytic Anemia   12  Pulmonary nodules  13  Former smoker  15  GERD  15  BPH  16  Prostate cancer  17  Coronary arterial disease  18  Dilated Cardiomyopathy  19  Left-bundle branch block  20  Osteoporosis  21  Osteoarthritis    Plan:    Neuro:   · Sedated on propofol with fentanyl bolus  RASS goal 0 to -1  CPOT>2  · Minimally interacting  Wean sedatives and reassess mental status  CV:   · Hypotensive earlier  Likely following induction and loss of sympathetic drive  · Briefly on epinephrine  This has been weaned off and replaced with norepinephrine  · Shock likely secondary to sedation  Wean sedation and monitor response in BP   · MAP goal >65  · Continue with ASCVD prophylaxis with ASA and statin  · BNP elevated as well  Last echo reported with elevated RSVP  Will repeat echo and look for progressive heart failure and possible right heart failure  · Will give one time dose of 40 mg IV lasix  Monitor UOP  May have decompensated heart failure but feel COPD exacerbation is main  of current symptoms at the moment      Pulm:  · Appears to be in acute exacerbation  · Will start aggressive bronchodilators  Xopenx/Atrovent QID, perforomist/pulmicort BID  · Solu-medrol 40 mg TID  · Continue ventilatory support  Hypercapnic initially  Repeat ABG following intubation shows chronic hypercapnia but improved pH  Current ventilator setting 20/350/40/6  Saturation goal >88%  · He is end-stage COPD  Discussion without patient pulmonologist and was informed of hospice, morphine and end of life goals   From notation he was angry about this discussion but stated he did not want to die in the hospital  However, given recent events there is significant concern for respiratory failure that will lead to difficulty weaning and decision of tracheostomy place vs compassionate extubation if that were to come about  Wife states she is unsure what his wishes would have been  Due to the nature of his disease and co-morbidities palliative care has been consulted for support and further deliniation of goals of care  Previously had been DNR/DNI per documentation    GI:   · At home he has a diagnosis of GERD  However, given concern for prolonged intubation will initiate stress ulcer prophylaxis  · As vasopressor requirements decrease will consider initiation of diet and cessation of stress ulcer prophylaxis      :   · No active issues  · Stanley placement for accurate measurements of I&Os while attempting diuresis  Consider removal once cessation of attempted diuresis  F/E/N:   · NPO for now  Can consider initiation of tube feeding once vasopressor requirement improve  · Monitor electrolytes  Goal K>4, Phos>3, Mag>2  · No additional IVF given concern for decompensated heart failrue      Heme/Onc:   · Pulmonary nodules noted  Serial monitoring  · Documented history of prostate cancer  Nothing to do for it now  · DVT ppx  Endo:   · Monitor BG while on steroids  Can initiate insulin subq if needed  · Goal -180      ID:   · No signs of infection  Given dose of antibiotics in ED  Hold further     · Follow culture data  · Can continue azithromycin for 3 days for COPD exacerbation      MSK/Skin:   · Pressure ulcer ppx  · UOOB and early mobilization    Disposition: Admit to Critical Care   Code Status: Level 1 - Full Code  --------------------------------------------------------------------------------------------------------------  Review of Systems   Unable to perform ROS: Intubated       Review of systems was unable to be performed secondary to respiratory distress, intubation, mechanical ventilation    Physical Exam  Vitals and nursing note reviewed  Constitutional:       General: He is not in acute distress  Appearance: He is cachectic  He is ill-appearing and toxic-appearing  Interventions: He is sedated and intubated  HENT:      Head: Normocephalic and atraumatic  Right Ear: External ear normal       Left Ear: External ear normal       Nose: Nose normal       Mouth/Throat:      Mouth: Mucous membranes are moist       Pharynx: Oropharynx is clear  Eyes:      General: No scleral icterus  Conjunctiva/sclera: Conjunctivae normal    Cardiovascular:      Rate and Rhythm: Normal rate and regular rhythm  Pulses: Normal pulses  Heart sounds: Normal heart sounds  No murmur heard  No friction rub  No gallop  Pulmonary:      Effort: Respiratory distress present  No tachypnea or accessory muscle usage  He is intubated  Breath sounds: Normal air entry  No decreased air movement  Wheezing present  No decreased breath sounds, rhonchi or rales  Abdominal:      General: Abdomen is flat  Bowel sounds are normal       Palpations: Abdomen is soft  Musculoskeletal:         General: No swelling or tenderness  Cervical back: Neck supple  No tenderness  Skin:     General: Skin is warm and dry  Neurological:      Mental Status: He is disoriented  --------------------------------------------------------------------------------------------------------------  Vitals:   Vitals:    02/12/23 0637 02/12/23 0645 02/12/23 0651 02/12/23 0732   BP: (!) 120/49 (!) 110/42 (!) 112/49    Pulse: 82 88 82    Resp: 17 17 17    Temp:       TempSrc:       SpO2: 100% 99% 100% 100%   Weight:         Temp  Min: 95 °F (35 °C)  Max: 95 °F (35 °C)        Body mass index is 24 84 kg/m²    N/A    Laboratory and Diagnostics:  Results from last 7 days   Lab Units 02/12/23  0529   WBC Thousand/uL 7 24   HEMOGLOBIN g/dL 8 9*   HEMATOCRIT % 28 5* PLATELETS Thousands/uL 204   NEUTROS PCT % 73   MONOS PCT % 17*     Results from last 7 days   Lab Units 02/12/23  0529   SODIUM mmol/L 129*   POTASSIUM mmol/L 4 0   CHLORIDE mmol/L 86*   CO2 mmol/L 39*   ANION GAP mmol/L 4   BUN mg/dL 20   CREATININE mg/dL 0 56*   CALCIUM mg/dL 8 2*   GLUCOSE RANDOM mg/dL 114   ALT U/L 24   AST U/L 39   ALK PHOS U/L 64   ALBUMIN g/dL 2 9*   TOTAL BILIRUBIN mg/dL 0 51          Results from last 7 days   Lab Units 02/12/23  0529   INR  0 93   PTT seconds 27          Results from last 7 days   Lab Units 02/12/23  0529   LACTIC ACID mmol/L 1 2     ABG:  Results from last 7 days   Lab Units 02/12/23  0549   PH ART  7 208*   PCO2 ART mm Hg 103 1*   PO2 ART mm Hg 412 0*   HCO3 ART mmol/L 40 1*   BASE EXC ART mmol/L 9 3   ABG SOURCE  Line, Arterial     VBG:  Results from last 7 days   Lab Units 02/12/23  0549   ABG SOURCE  Line, Arterial     Results from last 7 days   Lab Units 02/12/23  0529   PROCALCITONIN ng/ml 0 05       Micro:        EKG: LBBB, PVC, NSR  Imaging: I have personally reviewed pertinent reports        Historical Information   Past Medical History:   Diagnosis Date   • Acute metabolic encephalopathy 3/24/9106   • Arthritis    • Bladder mass    • Cardiomyopathy [facility])    • Chest pain    • COPD (chronic obstructive pulmonary disease) (HCC)    • CPAP (continuous positive airway pressure) dependence    • Emphysema of lung (HCC)    • Hypoxia     nocturnal   • Left bundle branch block    • Multiple pulmonary nodules     last assessed: 10/12/16   • Pneumonia    • Sleep apnea    • Sleep apnea, obstructive    • Smoker    • Weight loss 8/29/2019     Past Surgical History:   Procedure Laterality Date   • COLONOSCOPY     • CYSTOSCOPY     • CYSTOSCOPY  02/17/2021   • LA BLADDER INSTILLATION ANTICARCINOGENIC AGENT N/A 1/3/2020    Procedure: INSTILLATION MITOMYCIN;  Surgeon: Sanjana Jones MD;  [facility]: [facility];  Service: Urology   • LA CYSTO W/REMOVAL OF LESIONS SMALL N/A 1/3/2020    Procedure: TRANSURETHRAL RESECTION OF BLADDER TUMOR (TURBT);   Surgeon: Bea Pallas, MD;  [facility];  Service: Urology   • ME CYSTOURETHROSCOPY WITH BIOPSY N/A 2021    Procedure: Ruth Light;  Surgeon: Bea Pallas, MD;  [facility];  Service: Urology   • ME TRURL ELECTROSURG [address] PROSTATE BLEED COMPLETE N/A 2021    Procedure: TRANSURETHRAL RESECTION OF PROSTATE (TURP) BLADDER BIOPSY, FULGURATION;  Surgeon: Bea Pallas, MD;  [facility];  Service: Urology     Social History   Social History     Substance and Sexual Activity   Alcohol Use Not Currently    Comment: rarely     Social History     Substance and Sexual Activity   Drug Use No     Social History     Tobacco Use   Smoking Status Former   • Packs/day: 2 50   • Years: 42 00   • Pack years: 105 00   • Types: Cigarettes   • Start date:    • Quit date:    • Years since quittin 1   Smokeless Tobacco Former   Tobacco Comments    1 ppd for 37 years, 2010 down to 5 cigs a day, is around second hand smoke     Exercise History: limited  Family History:   Family History   Problem Relation Age of Onset   • Diabetes Mother      I have reviewed this patient's family history and commented on sigificant items within the HPI      Medications:  Current Facility-Administered Medications   Medication Dose Route Frequency   • aspirin chewable tablet 81 mg  81 mg Oral Daily   • azithromycin (ZITHROMAX) 500 mg in sodium chloride 0 9 % 250 mL IVPB  500 mg Intravenous Once   • [START ON 2023] azithromycin (ZITHROMAX) tablet 500 mg  500 mg Oral Q24H   • budesonide (PULMICORT) inhalation solution 0 5 mg  0 5 mg Nebulization Q12H   • chlorhexidine (PERIDEX) 0 12 % oral rinse 15 mL  15 mL Mouth/Throat Q12H [address]   • enoxaparin (LOVENOX) subcutaneous injection 40 mg  40 mg Subcutaneous Daily   • EPINEPHrine 3000 mcg (STANDARD CONCENTRATION) IV in sodium chloride 0 9% 250 mL  1-10 mcg/min Intravenous Titrated   • EPINEPHrine PF (ADRENALIN) 1 mg/mL injection **ADS Override Pull**       • ergocalciferol (VITAMIN D2) capsule 50,000 Units  50,000 Units Oral Q28 Days   • fentanyl citrate (PF) 100 MCG/2ML 50 mcg  50 mcg Intravenous Q1H PRN   • formoterol (PERFOROMIST) nebulizer solution 20 mcg  20 mcg Nebulization Q12H   • furosemide (LASIX) injection 40 mg  40 mg Intravenous Once   • levalbuterol (XOPENEX) inhalation solution 1 25 mg  1 25 mg Nebulization 4x Daily    And   • ipratropium (ATROVENT) 0 02 % inhalation solution 0 5 mg  0 5 mg Nebulization 4x Daily    And   • sodium chloride 0 9 % inhalation solution 3 mL  3 mL Nebulization 4x Daily   • methylPREDNISolone sodium succinate (Solu-MEDROL) injection 40 mg  40 mg Intravenous Q8H [facility]   • norepinephrine (LEVOPHED) 4 mg (STANDARD CONCENTRATION) IV in sodium chloride 0 9% 250 mL  1-30 mcg/min Intravenous Titrated   • omeprazole (PRILOSEC) suspension 2 mg/mL  20 mg Oral Daily   • pravastatin (PRAVACHOL) tablet 80 mg  80 mg Oral Daily With Dinner   • propofol (DIPRIVAN) 1000 mg in 100 mL infusion (premix)  5-50 mcg/kg/min Intravenous Titrated     Home medications:  Prior to Admission Medications   Prescriptions Last Dose Informant Patient Reported? Taking? albuterol (PROVENTIL HFA,VENTOLIN HFA) 90 mcg/act inhaler   No No   Sig: TAKE 2 PUFFS BY MOUTH EVERY 6 HOURS AS NEEDED FOR WHEEZE OR FOR SHORTNESS OF BREATH   aspirin 81 mg chewable tablet   No No   Sig: Chew 1 tablet (81 mg total) daily   budesonide (Pulmicort) 0 5 mg/2 mL nebulizer solution   No No   Sig: Take 2 mL (0 5 mg total) by nebulization 2 (two) times a day Rinse mouth after use     ergocalciferol (VITAMIN D2) 50,000 units   No No   Sig: TAKE 1 CAPSULE (50,000 UNITS TOTAL) BY MOUTH EVERY 28 DAYS   formoterol (PERFOROMIST) 20 MCG/2ML nebulizer solution   No No   Sig: TAKE 2 ML (20 MCG TOTAL) BY NEBULIZATION 2 (TWO) TIMES A DAY   furosemide (LASIX) 20 mg tablet   No No   Sig: Take 1 tablet (20 mg total) by mouth daily as needed (edema and weight gain)   guaiFENesin (MUCINEX) 600 mg 12 hr tablet   No No   Sig: Take 2 tablets (1,200 mg total) by mouth every 12 (twelve) hours   ipratropium-albuterol (DUO-NEB) 0 5-2 5 mg/3 mL nebulizer solution   No No   Sig: Take 3 mL by nebulization 4 (four) times a day   predniSONE 5 mg tablet   No No   Sig: Take 3-4 tablets daily   rosuvastatin (CRESTOR) 10 MG tablet   No No   Sig: TAKE 1 TABLET BY MOUTH EVERY DAY      Facility-Administered Medications: None     Allergies:  No Known Allergies  ------------------------------------------------------------------------------------------------------------  Advance Directive and Living Will:      Power of :    POLST:    ------------------------------------------------------------------------------------------------------------  Anticipated Length of Stay is > 2 6 [facility],         Portions of the record may have been created with voice recognition software  Occasional wrong word or "sound a like" substitutions may have occurred due to the inherent limitations of voice recognition software    Read the chart carefully and recognize, using context, where substitutions have occurred

## 2023-02-12 NOTE — RESPIRATORY THERAPY NOTE
Resp    02/12/23 0836   Respiratory Protocol   Protocol Initiated? Yes   Protocol Selection Respiratory   Language Barrier? Yes   Medical & Social History Reviewed? Yes   Diagnostic Studies Reviewed? Yes   Physical Assessment Performed? Yes   Respiratory Plan Vent/NIV/HFNC   Respiratory Assessment   Assessment Type Assess only   General Appearance Sedated   Respiratory Pattern Assisted   Chest Assessment Chest expansion symmetrical   Bilateral Breath Sounds Diminished   Resp Comments Pt received in micu w/ coronary artery disease  Pt with hx of COPD  Pt currently on 18/350/40/6 pt tolerating current settings well  no issues noted at this time   UDNs given

## 2023-02-12 NOTE — CONSULTS
Consultation - [address]  74 y o  male MRN: [id number]  Unit/Bed#: MICU 10 Encounter: [id number]      Assessment/Plan   Patient Active Problem List   Diagnosis   • Coronary artery disease   • Cardiomyopathy, dilated ([facility] )   • Chronic obstructive pulmonary disease (HCC)   • Acute systolic congestive heart failure (HCC)   • Left bundle-branch block   • KEVIN and COPD overlap syndrome (HCC)   • Hypercholesterolemia   • Gastroesophageal reflux disease   • Impaired fasting glucose   • Osteoarthritis   • Osteoporosis   • Vitamin D deficiency   • Mood disorder (HCC)   • Other insomnia   • Macrocytosis without anemia   • Chronic hypoxemic respiratory failure (HCC)   • BPH with obstruction/lower urinary tract symptoms   • Prostate cancer ([facility] )   • Pulmonary nodules/lesions, multiple   • Former tobacco use   • Mild protein-calorie malnutrition (HCC)   • Chronic HFrEF (heart failure with reduced ejection fraction) ([facility] )     Active issues specifically addressed today include:   - acute on chronic hypoxemic respiratory failure, intubated [02/12/2023]   - end-stage COPD   - HFrEF [EF 40%, TTE 08/26/2022]   - goals of care support   - palliative care encounter    Plan:  #symptom management   - per CCM team    #goals of care   - previously met with Palliative and Supportive Care during hospitalization 03/2022   - recent discussion with OP Pulmonology regarding home hospice   - no family at bedside, left VM for spouse   - [facility] will continue to reach out to engage for further supportive cares    #psychosocial support   - emotional support provided   - Clifton Medina [phone]      We appreciate the opportunity to participate in this patient's care  We will continue to follow  Please do not hesitate to contact our on-call provider through our clinic answering service at [phone] should you have acute symptom control concerns        Controlled Substance Review    [place] or [place]  reviewed: No red flags were identified; safe to proceed with prescription  [place] PDMP Review       Value Time User    PDMP Reviewed  Yes 2/12/2023  7:53 AM Bere Falcon MD          History of Present Illness   Physician Requesting Consult: Dony Arcos MD  Reason for Consult / Principal Problem: goals of care support  Hx and PE limited by: intubation/sedation  HPI: Luis Alberto Lares  is a 74y o  year old male with a PMH of chronic hypoxemic respiratory failure on 3L home O2 in the setting of end-stage COPD, HFrEF [EF 40%] who presented in respiratory distress after being intubated in the field by EMS  Transferred to ICU for escalation of care with [facility] consulted for further goals of care support  Per EMS review,  present for support during ED evaluation/initial management  Unable to obtain history from patient given intubation/sedation  No family at bedside          Inpatient consult to Palliative Care  Consult performed by: Bere Falcon MD  Consult ordered by: Cally Price DO          Review of Systems   Unable to perform ROS: Intubated       Historical Information   Past Medical History:   Diagnosis Date   • Acute metabolic encephalopathy 2/64/9629   • Arthritis    • Bladder mass    • Cardiomyopathy [facility])    • Chest pain    • COPD (chronic obstructive pulmonary disease) (HCC)    • CPAP (continuous positive airway pressure) dependence    • Emphysema of lung (HCC)    • Hypoxia     nocturnal   • Left bundle branch block    • Multiple pulmonary nodules     last assessed: 10/12/16   • Pneumonia    • Sleep apnea    • Sleep apnea, obstructive    • Smoker    • Weight loss 8/29/2019     Past Surgical History:   Procedure Laterality Date   • COLONOSCOPY     • CYSTOSCOPY     • CYSTOSCOPY  02/17/2021   • CT BLADDER INSTILLATION ANTICARCINOGENIC AGENT N/A 1/3/2020    Procedure: INSTILLATION MITOMYCIN;  Surgeon: Nicholas Carmen MD;  [facility];  Service: Urology   • CT CYSTO W/REMOVAL OF LESIONS SMALL N/A 1/3/2020    Procedure: TRANSURETHRAL RESECTION OF BLADDER TUMOR (TURBT); Surgeon: Dianna Day MD;  [facility];  Service: Urology   • PA CYSTOURETHROSCOPY WITH BIOPSY N/A 2021    Procedure: Elver Needle;  Surgeon: Dianna Day MD;  [facility];  Service: Urology   • PA TRURL ELECTROSURG [facility] PROSTATE BLEED COMPLETE N/A 2021    Procedure: TRANSURETHRAL RESECTION OF PROSTATE (TURP) BLADDER BIOPSY, FULGURATION;  Surgeon: Dianna Day MD;  [facility];  Service: Urology     E-Cigarette/Vaping   • E-Cigarette Use Never User      E-Cigarette/Vaping Substances   • Nicotine No    • THC No    • CBD No    • Flavoring No    • Other No    • Unknown No      Social History     Socioeconomic History   • Marital status: /Civil Union     Spouse name: Not on file   • Number of children: Not on file   • Years of education: Not on file   • Highest education level: Not on file   Occupational History   • Not on file   Tobacco Use   • Smoking status: Former     Packs/day: 2 50     Years: 42 00     Pack years: 105 00     Types: Cigarettes     Start date: 5     Quit date:      Years since quittin 1   • Smokeless tobacco: Former   • Tobacco comments:     1 ppd for 37 years, 2010 down to 5 cigs a day, is around second hand smoke   Vaping Use   • Vaping Use: Never used   Substance and Sexual Activity   • Alcohol use: Not Currently     Comment: rarely   • Drug use: No   • Sexual activity: Not Currently     Partners: Female   Other Topics Concern   • Not on file   Social History Narrative    Daily coffee consumption: 8 or more cups a day        Used to work in [employer]  On disability       Social Determinants of Health     Financial Resource Strain: Not on file   Food Insecurity: No Food Insecurity   • Worried About Running Out of Food in the Last Year: Never true   • Ran Out of Food in the Last Year: Never true   Transportation Needs: No Transportation Needs   • Lack of Transportation (Medical): No   • Lack of Transportation (Non-Medical):  No   Physical Activity: Not on file   Stress: Not on file   Social Connections: Not on file   Intimate Partner Violence: Not on file   Housing Stability: Unknown   • Unable to Pay for Housing in the Last Year: No   • Number of Places Lived in the Last Year: Not on file   • Unstable Housing in the Last Year: No     Family History   Problem Relation Age of Onset   • Diabetes Mother        Meds/Allergies   current meds:   Current Facility-Administered Medications   Medication Dose Route Frequency   • aspirin chewable tablet 81 mg  81 mg Oral Daily   • azithromycin (ZITHROMAX) 500 mg in sodium chloride 0 9 % 250 mL IVPB  500 mg Intravenous Once   • [START ON 2/13/2023] azithromycin (ZITHROMAX) tablet 500 mg  500 mg Oral Q24H   • budesonide (PULMICORT) inhalation solution 0 5 mg  0 5 mg Nebulization Q12H   • chlorhexidine (PERIDEX) 0 12 % oral rinse 15 mL  15 mL Mouth/Throat Q12H [facility]   • enoxaparin (LOVENOX) subcutaneous injection 40 mg  40 mg Subcutaneous Daily   • EPINEPHrine 3000 mcg (STANDARD CONCENTRATION) IV in sodium chloride 0 9% 250 mL  1-10 mcg/min Intravenous Titrated   • EPINEPHrine PF (ADRENALIN) 1 mg/mL injection **ADS Override Pull**       • ergocalciferol (VITAMIN D2) capsule 50,000 Units  50,000 Units Oral Q28 Days   • fentanyl citrate (PF) 100 MCG/2ML 50 mcg  50 mcg Intravenous Q1H PRN   • formoterol (PERFOROMIST) nebulizer solution 20 mcg  20 mcg Nebulization Q12H   • furosemide (LASIX) injection 40 mg  40 mg Intravenous Once   • levalbuterol (XOPENEX) inhalation solution 1 25 mg  1 25 mg Nebulization 4x Daily    And   • ipratropium (ATROVENT) 0 02 % inhalation solution 0 5 mg  0 5 mg Nebulization 4x Daily    And   • sodium chloride 0 9 % inhalation solution 3 mL  3 mL Nebulization 4x Daily   • methylPREDNISolone sodium succinate (Solu-MEDROL) injection 40 mg  40 mg Intravenous Q8H [facility]   • norepinephrine (LEVOPHED) 4 mg (STANDARD CONCENTRATION) IV in sodium chloride 0 9% 250 mL  1-30 mcg/min Intravenous Titrated   • omeprazole (PRILOSEC) suspension 2 mg/mL  20 mg Oral Daily   • pravastatin (PRAVACHOL) tablet 80 mg  80 mg Oral Daily With Dinner   • propofol (DIPRIVAN) 1000 mg in 100 mL infusion (premix)  5-50 mcg/kg/min Intravenous Titrated         No Known Allergies    Objective     Physical Exam  Vitals and nursing note reviewed  Constitutional:       Appearance: He is not diaphoretic  Interventions: He is intubated  Comments: Critically ill appearing  BMI 24 8   HENT:      Head: Normocephalic and atraumatic  Right Ear: External ear normal       Left Ear: External ear normal    Eyes:      Comments: Eyes remained closed throughout examination   Cardiovascular:      Rate and Rhythm: Normal rate  Pulmonary:      Effort: He is intubated  Skin:     Coloration: Skin is pale  Neurological:      Comments: Sedated   Psychiatric:      Comments: Unable to assess         Lab Results:   I have personally reviewed pertinent labs  , CBC:   Lab Results   Component Value Date    WBC 7 24 02/12/2023    HGB 8 9 (L) 02/12/2023    HCT 28 5 (L) 02/12/2023     (H) 02/12/2023     02/12/2023    MCH 31 8 02/12/2023    MCHC 31 2 (L) 02/12/2023    RDW 12 2 02/12/2023    MPV 9 7 02/12/2023    NRBC 0 02/12/2023   , CMP:   Lab Results   Component Value Date    SODIUM 129 (L) 02/12/2023    K 4 0 02/12/2023    CL 86 (L) 02/12/2023    CO2 39 (H) 02/12/2023    BUN 20 02/12/2023    CREATININE 0 56 (L) 02/12/2023    CALCIUM 8 2 (L) 02/12/2023    AST 39 02/12/2023    ALT 24 02/12/2023    ALKPHOS 64 02/12/2023    EGFR 108 02/12/2023   , PT/PTT:  Lab Results   Component Value Date    PTT 27 02/12/2023     Imaging Studies: I have personally reviewed pertinent reports  EKG, Pathology, and Other Studies: I have personally reviewed pertinent reports        Code Status: Level 1 - Full Code  Advance Directive and Living Will:   Not on File, not previously completed  Power of :   n/a  POLST:   n/a    Counseling / Coordination of Care  Total floor / unit time spent today 25 minutes  Greater than 50% of total time was spent with the patient and / or family counseling and / or coordination of care  A description of the counseling / coordination of care: provided medical updates, discussed palliative care, determined competency, determined goals of care, determined POA, determined social/family support, discussed plans of care, discussed symptom management, provided psychosocial support  PDMP Reviewed   Coordinated plan of care with primary team

## 2023-02-12 NOTE — PROCEDURES
Central Line    Date/Time: 2/12/2023 5:05 PM  Performed by: Olvin Aviles MD  Authorized by: Olvin Aviles MD     Patient location:  ICU  Other Assisting Provider: Yes (comment) Carmelita Dumont    Consent:     Consent obtained:  Verbal    Consent given by:  Spouse    Risks discussed:  Arterial puncture, incorrect placement, pneumothorax, infection and bleeding    Alternatives discussed:  No treatment and delayed treatment  Universal protocol:     Patient identity confirmed:  Arm band  Pre-procedure details:     Hand hygiene: Hand hygiene performed prior to insertion      Sterile barrier technique: All elements of maximal sterile technique followed      Skin preparation:  2% chlorhexidine    Skin preparation agent: Skin preparation agent completely dried prior to procedure    Sedation:     Sedation type: Anxiolysis (2mg versed, 50mcg fentanyl)  Procedure details:     Location:  Right internal jugular    Vessel type: vein      Laterality:  Right    Approach: percutaneous technique used      Catheter type:  Triple lumen 16cm    Landmarks identified: yes      Ultrasound guidance: yes      Ultrasound image availability:  Still images obtained    Sterile ultrasound techniques: Sterile gel and sterile probe covers were used      Number of attempts:  1    Successful placement: yes    Post-procedure details:     Post-procedure:  Dressing applied and line sutured    Assessment:  Blood return through all ports and free fluid flow    Patient tolerance of procedure:   Tolerated well, no immediate complications 96

## 2023-02-12 NOTE — ED ATTENDING ATTESTATION
Final Diagnoses:     1  Acute on chronic respiratory failure with hypoxia and hypercapnia (HCC)    2  Acute respiratory failure ([facility] [address] )    3  COPD (chronic obstructive pulmonary disease) ([facility] [address] )    4  CHF (congestive heart failure) ([facility] [address] )    5  [facility])      ED Course as of 02/13/23 2201   Sun Feb 12, 2023   0705 pCO2, Arterial(!!): 103 1       I, Jamila Hester MD, saw and evaluated the patient  All available labs and X-rays were ordered by me or the resident and have been reviewed by myself  I discussed the patient with the resident / non-physician and agree with the resident's / non-physician practitioner's findings and plan as documented in the resident's / non-physician practicitioner's note, except where noted  At this point, I agree with the current assessment done in the ED  I was present during key portions of all procedures performed unless otherwise stated  Nursing Triage:     Chief Complaint   Patient presents with   • Respiratory Distress     From home  Sick for a few days per wife, was hallucinating yesterday  Gasping upom EMS arrival, unable to speak  Intubated in field       HPI:   This is a 74 y o  male presenting for evaluation of COPD vs  CHF  For a couple days the patient has been having hallucinations  Today (this morning) it seemed like he was extremely SOB even more than previously, so EMS called  His O2 saturation upon arrival was well <50%  With NRB it improved to 70%  He was intubated in the field with ketamine  Received steroids (solumedrol 125), magnesium (2g), terbutiline (0 5?), fluids (1 1L prehospital)  He was intubated with success  Initial pCO2 was   Patient then brought in  Seen immediately on arrival     PE:  - Airway intubated  - Breathsounds bilaterally present, wheezing, poor air movement, very tight  - C weak bilateral 1+ radials  - D GCS3  - E feels warm to the touch    Airway confirmed with capnography, saturating 100%    Access achieved (x3)     Patient had POCUS  - Images collected / performed by me  - EF ? HFrEF; global hypokinesis, dilated LV cavity  - TAPSE appeared normal    - Hepatic vein appears dilated but only forward flow, no police siren / retrograde flow  - no obvious effusions  - B-lines noted LEFT anterior > RIGHT  Also normal lung sliding anteriorly  Patient was hypotensive in the field (75/50) and so received EPI bolus (40mcg)  EPI drip continued here, initially 5mcg/min  Fluids initially continued until POCUS done, then held given reduced EF  Ketamine given 100mg for calming patient  Very difficult to bag  With OG tube insertion, 100-200mL of mucous output came out  Requirements of EPI drip decreased (15 ? 10 ? 5) despite increasing dose of propofol  CXR reviewed, no obvious PTX though decreased markings on the RIGHT (though all the way to periphery)  A-line placed  Resident went to update family  Resident placed A-line under my supervision using ultrasound guidance  It correlated with BP cuff  Will start empiric abx  Viral testing  Sepsis workup    I think this is likely both CHF (legs are very swollen) and COPD, though likely more COPD disease going on  Awaiting ABG    ASSESSMENT + PLAN:   As above     Vitals:    02/13/23 1600 02/13/23 1700 02/13/23 1800 02/13/23 1943   BP: 120/72 120/78 117/80    BP Location: Right arm Right arm     Pulse: 90 90 90    Resp: 20 20     Temp: 99 7 °F (37 6 °C) 100 °F (37 8 °C) 100 °F (37 8 °C)    TempSrc: Probe Probe     SpO2: 98% 99% 99% 98%   Weight:       Height:         - There are no obvious limitations to social determinants of care  - Nursing note reviewed  - Vitals reviewed     - Orders placed by myself and/or advanced practitioner / resident     - Previous chart was reviewed  - No language barrier    - History obtained from EMS crew   - There are limitations to the history obtained: intubated patient    Critical Care Time:   - Critical care time: 60 minutes  - Critical care time was exclusive of seperately bilable procedures and treating other patients as well as teaching time  - Critical care was necessary to treat or prevent imminent or life-threatening deterioration of the following condition: severe dyspnea   - Critical care time was spent personally by me on the following activities as well as the above as per the ED course and rest of chart: blood draw for specimens, obtaining history from patient / surrogate, developement of a treatment plan, discussions with consultants, evaluation of patient's response to the treatment, examination of the patient, ordering/performing treatements and interventions, re-evaluation of the patient's condition, review of old charts, ordering/reviewing laboratory studies and ordering/reviewing of radiographic studies    Past Medical:    has a past medical history of Acute metabolic encephalopathy (3/68/6067), Arthritis, Bladder mass, Cardiomyopathy ([facility] [address] ), Chest pain, COPD (chronic obstructive pulmonary disease) ([facility] [address] ), CPAP (continuous positive airway pressure) dependence, Emphysema of lung ([facility] [address] ), Hypoxia, Left bundle branch block, Multiple pulmonary nodules, Pneumonia, Sleep apnea, Sleep apnea, obstructive, Smoker, and Weight loss (2019)  Past Surgical:    has a past surgical history that includes pr cysto w/removal of lesions small (N/A, 1/3/2020); pr bladder instillation anticarcinogenic agent (N/A, 1/3/2020); Cystoscopy; Cystoscopy (2021); Colonoscopy; pr trurl electrosurg rescj prostate bleed complete (N/A, 2021); and pr cystourethroscopy with biopsy (N/A, 2021)      Social:     Social History     Substance and Sexual Activity   Alcohol Use Not Currently    Comment: rarely     Social History     Tobacco Use   Smoking Status Former   • Packs/day: 2 50   • Years: 42 00   • Pack years: 105 00   • Types: Cigarettes   • Start date:    • Quit date:    • Years since quittin  Smokeless Tobacco Former   Tobacco Comments    1 ppd for 37 years,  down to 5 cigs a day, is around second hand smoke     Social History     Substance and Sexual Activity   Drug Use No       Echo:   Results for orders placed during the hospital encounter of 19    Echo complete with contrast if indicated    Narrative  [address]  [phone]    Transthoracic Echocardiogram  2D, M-mode, Doppler, and Color Doppler    Study date:  23-Dec-2019    Patient: Kirk Hoang  MR number: [id number]  Account number: [de-identified]  : 1955  Age: 60 years  Gender: Male  Status: Outpatient  Location: [address] [place] [address] [facility] [place]  Height: 62 in  Weight: 132 7 lb  BP: 122/ 80 mmHg    Indications: Cardiomyopathy, Heart Failure    Diagnoses: I42 9 - Cardiomyopathy, unspecified, I50 9 - Heart failure, unspecified    Sonographer:  Mamie Pepe RDCS  Primary Physician:  Bogdan Huntley DO  Referring Physician:  Lito Sousa MD  Group:  [facility]  Interpreting Physician:  Evelia Plummer MD    SUMMARY    SUMMARY:  lv in 50% range; very limitedstudy  poor penetration  no significant vaive issues  lbbb with septal bounce    LEFT VENTRICLE:  There were no regional wall motion abnormalities  VENTRICULAR SEPTUM:  There was dyssynergic motion  These changes are consistent with a conduction abnormality or paced rhythm  HISTORY: PRIOR HISTORY: Hyperlipidemia, CAD, LBBB, Cardiomyopathy, CHF, Former Smoker, COPD, GERD    PROCEDURE: The study was performed in the [address]  This was a routine study  The transthoracic approach was used  The study included complete 2D imaging, M-mode, complete spectral Doppler, and color Doppler  The  heart rate was 76 bpm, at the start of the study  Images were obtained from the parasternal, apical, subcostal, and suprasternal notch acoustic windows   Echocardiographic views were limited due to lung interference  This was a technically  difficult study  LEFT VENTRICLE: Size was normal  Ejection fraction was estimated in the range of 45 % to 55 % to be 50 %  There were no regional wall motion abnormalities  DOPPLER: The study was not technically sufficient to allow evaluation of LV  diastolic function  VENTRICULAR SEPTUM: There was dyssynergic motion  These changes are consistent with a conduction abnormality or paced rhythm  RIGHT VENTRICLE: The size was normal  Systolic function was normal  Wall thickness was normal     LEFT ATRIUM: Size was normal     RIGHT ATRIUM: Size was normal     MITRAL VALVE: Not well visualized  TRICUSPID VALVE: Not well visualized  PULMONIC VALVE: Not well visualized  PERICARDIUM: There was no pericardial effusion  The pericardium was normal in appearance  AORTA: The root exhibited normal size  SYSTEMIC VEINS: IVC: Not assessed      SYSTEM MEASUREMENT TABLES    2D  %FS: 21 44 %  Ao Diam: 3 01 cm  EDV(Teich): 114 61 ml  EF Biplane: 46 59 %  EF(Cube): 51 51 %  EF(Teich): 43 34 %  ESV(Cube): 58 22 ml  ESV(Teich): 64 94 ml  IVSd: 1 03 cm  LA Area: 15 57 cm2  LA Diam: 2 95 cm  LVEDV MOD A2C: 105 95 ml  LVEDV MOD A4C: 80 23 ml  LVEDV MOD BP: 92 89 ml  LVEF MOD A2C: 47 66 %  LVEF MOD A4C: 48 6 %  LVESV MOD A2C: 55 45 ml  LVESV MOD A4C: 41 24 ml  LVESV MOD BP: 49 61 ml  LVIDd: 4 93 cm  LVIDs: 3 88 cm  LVLd A2C: 7 46 cm  LVLd A4C: 7 59 cm  LVLs A2C: 6 12 cm  LVLs A4C: 5 57 cm  LVPWd: 0 94 cm  RA Area: 14 54 cm2  RV Diam : 3 71 cm  SI(Cube): 38 42 ml/m2  SI(Teich): 30 85 ml/m2  SV MOD A2C: 50 5 ml  SV MOD A4C: 38 99 ml  SV(Cube): 61 85 ml  SV(Teich): 49 67 ml    MM  TAPSE: 1 28 cm    PW  E': 0 06 m/s  E/E': 7 78  MV A Gaurang: 0 66 m/s  MV Dec Sharkey: 1 75 m/s2  MV DecT: 280 57 ms  MV E Gaurang: 0 49 m/s  MV E/A Ratio: 0 75    [employer] Accredited Echocardiography Laboratory    Prepared and electronically signed by    Fortunato Ramos , MD  Signed 23-Dec-2019 13:40:12    No results found for this or any previous visit  Cath:    No results found for this or any previous visit        Code Status: Level 1 - Full Code  Advance Directive and Living Will:      Power of :    POLST:    Medications   EPINEPHrine PF (ADRENALIN) 1 mg/mL injection **ADS Override Pull** (  Canceled Entry 2/12/23 6702)   propofol (DIPRIVAN) 1000 mg in 100 mL infusion (premix) (25 mcg/kg/min × 61 6 kg Intravenous New Bag 2/13/23 1724)   norepinephrine (LEVOPHED) 4 mg (STANDARD CONCENTRATION) IV in sodium chloride 0 9% 250 mL (8 mcg/min Intravenous New Bag 2/13/23 1735)   omeprazole (PRILOSEC) suspension 2 mg/mL (20 mg Oral Given 2/13/23 0801)   enoxaparin (LOVENOX) subcutaneous injection 40 mg (40 mg Subcutaneous Given 2/13/23 0801)   chlorhexidine (PERIDEX) 0 12 % oral rinse 15 mL (15 mL Mouth/Throat Given 2/13/23 0801)   fentanyl citrate (PF) 100 MCG/2ML 50 mcg (50 mcg Intravenous Given 2/13/23 1949)   aspirin chewable tablet 81 mg (81 mg Oral Given 2/13/23 0801)   ergocalciferol (VITAMIN D2) capsule 50,000 Units (50,000 Units Oral Not Given 2/12/23 0951)   pravastatin (PRAVACHOL) tablet 80 mg (80 mg Oral Given 2/13/23 1608)   formoterol (PERFOROMIST) nebulizer solution 20 mcg (20 mcg Nebulization Given 2/13/23 1941)   budesonide (PULMICORT) inhalation solution 0 5 mg (0 5 mg Nebulization Given 2/13/23 1941)   methylPREDNISolone sodium succinate (Solu-MEDROL) injection 40 mg (40 mg Intravenous Given 2/13/23 1525)   azithromycin (ZITHROMAX) tablet 500 mg (500 mg Oral Given 2/13/23 0801)   levalbuterol (XOPENEX) inhalation solution 1 25 mg (1 25 mg Nebulization Given 2/13/23 1941)     And   ipratropium (ATROVENT) 0 02 % inhalation solution 0 5 mg (0 5 mg Nebulization Given 2/13/23 1941)   cefTRIAXone (ROCEPHIN) 1,000 mg in dextrose 5 % 50 mL IVPB (1,000 mg Intravenous New Bag 2/13/23 8510)   fentanyl citrate (PF) 100 MCG/2ML 50 mcg ( Intravenous Canceled Entry 2/12/23 1624)   midazolam (VERSED) 2 mg/2 mL injection **ADS Override Pull** (  Canceled Entry 2/12/23 1624)   dexmedeTOMIDine (Precedex) 400 mcg in sodium chloride 0 9% 100 mL (0 5 mcg/kg/hr × 54 2 kg Intravenous Rate/Dose Change 2/13/23 1320)   vasopressin (PITRESSIN) 20 Units in sodium chloride 0 9 % 100 mL infusion (0 04 Units/min Intravenous New Bag 2/13/23 1834)   fentanyl citrate (PF) (FOR EMS ONLY) 100 mcg/2 mL injection 100 mcg (0 mcg Does not apply Given to EMS 2/12/23 0524)   ketamine (FOR EMS ONLY) (KETALAR) 100 mg/mL 500 mg (0 mg Does not apply Given to EMS 2/12/23 0523)   magnesium sulfate (FOR EMS ONLY) 2 g/50 mL IVPB (premix) 2 g (0 g Does not apply Given to EMS 2/12/23 0522)   methylPREDNISolone sodium succinate (FOR EMS ONLY) (Solu-MEDROL) 125 MG injection 125 mg (0 mg Does not apply Given to EMS 2/12/23 0522)   midazolam (FOR EMS ONLY) (VERSED) 2 mg/2 mL injection 2 mg (0 mg Does not apply Given to EMS 2/12/23 0524)   terbutaline (FOR EMS ONLY) (BRETHINE) injection 1 mg (0 mg Does not apply Given to EMS 2/12/23 0522)   EPINEPHrine (FOR EMS ONLY) (ADRENALIN) injection 1 mg (0 mg Does not apply Given to EMS 2/12/23 0522)   albuterol inhalation solution 10 mg (10 mg Nebulization Given by Other 2/12/23 0600)     And   ipratropium (ATROVENT) 0 02 % inhalation solution 1 mg (1 mg Nebulization Given by Other 2/12/23 8405)     And   sodium chloride 0 9 % inhalation solution 3 mL (3 mL Nebulization Given by Other 2/12/23 0600)   cefTRIAXone (ROCEPHIN) 2,000 mg in dextrose 5 % 50 mL IVPB (0 mg Intravenous Stopped 2/12/23 0700)   azithromycin (ZITHROMAX) 500 mg in sodium chloride 0 9 % 250 mL IVPB (0 mg Intravenous Stopped 2/12/23 1030)   fentanyl citrate (PF) 100 MCG/2ML 100 mcg (100 mcg Intravenous Given by Other 2/12/23 1364)   ketamine (KETALAR) 100 mg (100 mg Intravenous Given by Other 2/12/23 2480)   furosemide (LASIX) injection 40 mg (40 mg Intravenous Given 2/12/23 4645)   perflutren lipid microsphere (DEFINITY) injection (0 8 mL/min Intravenous Given 2/12/23 0920)   albumin human (FLEXBUMIN) 25 % injection 25 g (0 g Intravenous Stopped 2/12/23 1800)   midazolam (VERSED) injection 2 mg (2 mg Intravenous Given 2/12/23 1600)   albumin human (FLEXBUMIN) 5 % injection 25 g (0 g Intravenous Stopped 2/13/23 1832)     XR chest portable ICU   Final Result   Tubes and lines as above without pneumothorax  Recommend advancement of nasogastric tube  No acute cardiopulmonary disease  [place] [facility] [place]  Workstation performed: [id number]         XR chest portable   Final Result      1  Persistent high positioning of the endotracheal tube  Advancement by at least 4 cm advised  2   Tip of right internal jugular central venous catheter projects over the lower SVC  No pneumothorax  3   Persistent pulmonary vascular congestion  [place] [facility]  Workstation performed: [id number]         CT head wo contrast   Final Result      No acute intracranial abnormality  Workstation performed: [id number]         XR chest 1 view portable   ED Interpretation   Increased cephalization on LEFT > RIGHT  ITubated       Final Result      Moderate pulmonary venous congestion  ET tube 6 cm above the ghada  Workstation performed: [id number]           Orders Placed This Encounter   Procedures   • Central Line   • Blood culture #1   • Blood culture #2   • Respiratory Panel 2  1(RP2)with COVID19   • Sputum culture and Gram stain   • Legionella antigen, urine   • Strep Pneumoniae, Urine   • Urine culture   • XR chest 1 view portable   • CT head wo contrast   • XR chest portable   • XR chest portable ICU   • APTT   • Protime-INR   • CBC and differential   • Comprehensive metabolic panel   • Lactic acid   • Procalcitonin   • UA w Reflex to Microscopic w Reflex to Culture   • Blood gas, arterial   • HS Troponin 0hr (reflex protocol)   • NT-BNP PRO-[facility] campuses only   • HS Troponin I 2hr   • HS Troponin I 4hr   • CBC and differential   • Comprehensive metabolic panel   • Magnesium   • Phosphorus   • Calcium, ionized   • Procalcitonin   • Blood gas, arterial   • Platelet count   • Sodium, urine, random   • Chloride, urine, random   • Creatinine, urine, random   • Osmolality-"If this is regarding a toxic alcohol, STOP  Test is not routinely indicated  Please consult medical  on call for further guidance "   • Osmolality, urine   • Blood gas, arterial   • Peripheral Smear   • Lactate dehydrogenase   • Haptoglobin   • Retic Count   • Iron Saturation %   • Ferritin   • Blood gas, venous   • Blood gas, venous   • Urine Microscopic   • CBC and differential   • Comprehensive metabolic panel   • Magnesium   • Phosphorus   • Calcium, ionized   • Blood gas, venous   • Blood gas, arterial   • Diet Enteral/Parenteral; Tube Feeding No Oral Diet; Vital AF 1 2; Cyclic; 50; 22 hours; 273;  Water; Every 6 hours   • Insert and maintain peripheral IV x 2 (18 gauge or >)   • Continuous cardiac monitoring   • Apply warming blanket   • Cardio-Pulmonary Monitoring (Critical Care & Step Down Only)   • Vital Signs   • Up with assistance   • Nasal Antiseptic Swab   • Turn patient   • Daily weights   • I/O   • CAM (ICU) Assessment   • Nursing dysphagia assessment   • Neuro checks   • Insert peripheral IV   • Maintain IV access   • Apply SCD or Foot pumps   • ET tube suction   • Early Mobilization   • Elevate HOB   • Orogastric tube insertion   • Oral care   • Suction deep laryngeal   • Maintain suction equipment   • Change oral suction equipment   • Assess sedation level - goal not achieved   • Assess sedation level - goal achieved   • Notify provider   • Daily Awakening Trial   • Document reasons for exclusion   • Care Document reasons for failure   • Insert urinary catheter   • Titrate O2 (oxygen) to keep saturation at   • Nursing Communication Please advance NG tube 2-4 cm into the stomach Thank you   • Level 1-Full Code: all life saving measures are indicated   • Consult to Case Management   • Inpatient consult to Palliative Care   • Inpatient Consult to Nutrition Services   • Contact and Airborne isolation status   • Respiratory Protocol   • Weaning and Extubation Protocol   • Mechanical ventilation   • Endotracheal Tube Position Adjustment   • ECG 12 lead   • ECG 12 lead   • ECG 12 lead   • Echo follow up/limited w/ contrast if indicated   • Insert arterial line   • Inpatient Admission   • Restraints non-violent   • Fall precautions     Labs Reviewed   CBC AND DIFFERENTIAL - Abnormal       Result Value Ref Range Status    WBC 7 24  4 31 - 10 16 Thousand/uL Final    RBC 2 80 (*) 3 88 - 5 62 Million/uL Final    Hemoglobin 8 9 (*) 12 0 - 17 0 g/dL Final    Hematocrit 28 5 (*) 36 5 - 49 3 % Final     (*) 82 - 98 fL Final    MCH 31 8  26 8 - 34 3 pg Final    MCHC 31 2 (*) 31 4 - 37 4 g/dL Final    RDW 12 2  11 6 - 15 1 % Final    MPV 9 7  8 9 - 12 7 fL Final    Platelets 538  484 - 390 Thousands/uL Final    nRBC 0  /100 WBCs Final    Neutrophils Relative 73  43 - 75 % Final    Immat GRANS % 0  0 - 2 % Final    Lymphocytes Relative 10 (*) 14 - 44 % Final    Monocytes Relative 17 (*) 4 - 12 % Final    Eosinophils Relative 0  0 - 6 % Final    Basophils Relative 0  0 - 1 % Final    Neutrophils Absolute 5 27  1 85 - 7 62 Thousands/µL Final    Immature Grans Absolute 0 03  0 00 - 0 20 Thousand/uL Final    Lymphocytes Absolute 0 71  0 60 - 4 47 Thousands/µL Final    Monocytes Absolute 1 21  0 17 - 1 22 Thousand/µL Final    Eosinophils Absolute 0 01  0 00 - 0 61 Thousand/µL Final    Basophils Absolute 0 01  0 00 - 0 10 Thousands/µL Final   COMPREHENSIVE METABOLIC PANEL - Abnormal    Sodium 129 (*) 135 - 147 mmol/L Final    Potassium 4 0  3 5 - 5 3 mmol/L Final    Chloride 86 (*) 96 - 108 mmol/L Final    CO2 39 (*) 21 - 32 mmol/L Final    ANION GAP 4  4 - 13 mmol/L Final    BUN 20  5 - 25 mg/dL Final    Creatinine 0 56 (*) 0 60 - 1 30 mg/dL Final    Comment: Standardized to IDMS reference method    Glucose 114  65 - 140 mg/dL Final    Comment: If the patient is fasting, the ADA then defines impaired fasting glucose as > 100 mg/dL and diabetes as > or equal to 123 mg/dL  Specimen collection should occur prior to Sulfasalazine administration due to the potential for falsely depressed results  Specimen collection should occur prior to Sulfapyridine administration due to the potential for falsely elevated results  Calcium 8 2 (*) 8 3 - 10 1 mg/dL Final    Corrected Calcium 9 1  8 3 - 10 1 mg/dL Final    AST 39  5 - 45 U/L Final    Comment: Specimen collection should occur prior to Sulfasalazine administration due to the potential for falsely depressed results  ALT 24  12 - 78 U/L Final    Comment: Specimen collection should occur prior to Sulfasalazine and/or Sulfapyridine administration due to the potential for falsely depressed results  Alkaline Phosphatase 64  46 - 116 U/L Final    Total Protein 5 3 (*) 6 4 - 8 4 g/dL Final    Albumin 2 9 (*) 3 5 - 5 0 g/dL Final    Total Bilirubin 0 51  0 20 - 1 00 mg/dL Final    Comment: Use of this assay is not recommended for patients undergoing treatment with eltrombopag due to the potential for falsely elevated results      eGFR 108  ml/min/1 73sq m Final    Narrative:     [place] guidelines for Chronic Kidney Disease (CKD):   •  Stage 1 with normal or high GFR (GFR > 90 mL/min/1 73 square meters)  •  Stage 2 Mild CKD (GFR = 60-89 mL/min/1 73 square meters)  •  Stage 3A Moderate CKD (GFR = 45-59 mL/min/1 73 square meters)  •  Stage 3B Moderate CKD (GFR = 30-44 mL/min/1 73 square meters)  •  Stage 4 Severe CKD (GFR = 15-29 mL/min/1 73 square meters)  •  Stage 5 End Stage CKD (GFR <15 mL/min/1 73 square meters)  Note: GFR calculation is accurate only with a steady state creatinine   BLOOD GAS, ARTERIAL - Abnormal    pH, Arterial 7 208 (*) 7 350 - 7 450 Final    pCO2, Arterial 103 1 (*) 36 0 - 44 0 mm Hg Final    pO2, Arterial 412 0 (*) 75 0 - 129 0 mm Hg Final    HCO3, Arterial 40 1 (*) 22 0 - 28 0 mmol/L Final    Base Excess, Arterial 9 3  mmol/L Final    O2 Content, Arterial 15 8 (*) 16 0 - 23 0 mL/dL Final    O2 HGB,Arterial  97 6 (*) 94 0 - 97 0 % Final    SOURCE Line, Arterial   Final   NT-BNP PRO-[facility],[place] C[facility]USES ONLY - Abnormal    NT-proBNP 6,786 (*) <125 pg/mL Final   APTT - Normal    PTT 27  23 - 37 seconds Final    Comment: Therapeutic Heparin Range =  60-90 seconds   PROTIME-INR - Normal    Protime 12 6  11 6 - 14 5 seconds Final    INR 0 93  0 84 - 1 19 Final   LACTIC ACID, PLASMA - Normal    LACTIC ACID 1 2  0 5 - 2 0 mmol/L Final    Narrative:     Result may be elevated if tourniquet was used during collection  HS TROPONIN I 0HR - Normal    hs TnI 0hr 42  "Refer to ACS Flowchart"- see link ng/L Final    Comment:                                              Initial (time 0) result  If >=50 ng/L, Myocardial injury suggested ;  Type of myocardial injury and treatment strategy  to be determined  If 5-49 ng/L, a delta result at 2 hours and or 4 hours will be needed to further evaluate  If <4 ng/L, and chest pain has been >3 hours since onset, patient may qualify for discharge based on the HEART score in the ED  If <5 ng/L and <3hours since onset of chest pain, a delta result at 2 hours will be needed to further evaluate  HS Troponin 99th Percentile URL of a Health Population=12 ng/L with a 95% Confidence Interval of 8-18 ng/L  Second Troponin (time 2 hours)  If calculated delta >= 20 ng/L,  Myocardial injury suggested ; Type of myocardial injury and treatment strategy to be determined  If 5-49 ng/L and the calculated delta is 5-19 ng/L, consult medical service for evaluation    Continue evaluation for ischemia on ecg and other possible etiology and repeat hs troponin at 4 hours  If delta is <5 ng/L at 2 hours, consider discharge based on risk stratification via the HEART score (if in ED), or MARK risk score in IP/Observation  HS Troponin 99th Percentile URL of a Health Population=12 ng/L with a 95% Confidence Interval of 8-18 ng/L  POCT GLUCOSE - Normal    POC Glucose 100  65 - 140 mg/dl Final     Time reflects when diagnosis was documented in both MDM as applicable and the Disposition within this note     Time User Action Codes Description Comment    2/12/2023  6:31 AM Daryl Maize Add [J96 21,  J96 22] Acute on chronic respiratory failure with hypoxia and hypercapnia ([place] [address] )     2/12/2023  6:31 AM Johnathon Bloom Add [J96 00] Acute respiratory failure ([place] [address] )     2/12/2023  6:32 AM Johnathon Bloom Add [J44 9] COPD (chronic obstructive pulmonary disease) ([place] [address] )     2/12/2023  6:32 AM Johnathon Bloom Add [I50 9] CHF (congestive heart failure) ([place] [address] )     2/12/2023  5:06 PM Alberto Quach Add [R57 9] [facility])       ED Disposition     ED Disposition   Admit    Condition   Stable    Date/Time   Sun Feb 12, 2023  6:31 AM    Comment   Case was discussed with Dr Sherren Commodore and the patient's admission status was agreed to be Admission Status: inpatient status to the service of Dr Sherren Commodore   Follow-up Information    None       Current Discharge Medication List      CONTINUE these medications which have NOT CHANGED    Details   albuterol (PROVENTIL HFA,VENTOLIN HFA) 90 mcg/act inhaler TAKE 2 PUFFS BY MOUTH EVERY 6 HOURS AS NEEDED FOR WHEEZE OR FOR SHORTNESS OF BREATH  Qty: 8 5 g, Refills: 11    Comments: DX Code Needed      Associated Diagnoses: Chronic obstructive pulmonary disease, unspecified COPD type ([place] [address] )      aspirin 81 mg chewable tablet Chew 1 tablet (81 mg total) daily  Qty: 30 tablet, Refills: 0    Associated Diagnoses: Acute respiratory failure with hypoxia and hypercapnia (HCC); COPD with acute exacerbation (HCC)      budesonide (Pulmicort) 0 5 mg/2 mL nebulizer solution Take 2 mL (0 5 mg total) by nebulization 2 (two) times a day Rinse mouth after use  Qty: 120 mL, Refills: 5    Associated Diagnoses: Pulmonary emphysema, unspecified emphysema type (HCC)      ergocalciferol (VITAMIN D2) 50,000 units TAKE 1 CAPSULE (50,000 UNITS TOTAL) BY MOUTH EVERY 28 DAYS  Qty: 3 capsule, Refills: 1    Associated Diagnoses: Localized osteoporosis without current pathological fracture; Vitamin D deficiency      formoterol (PERFOROMIST) 20 MCG/2ML nebulizer solution TAKE 2 ML (20 MCG TOTAL) BY NEBULIZATION 2 (TWO) TIMES A DAY  Qty: 120 mL, Refills: 5    Associated Diagnoses: Pulmonary emphysema, unspecified emphysema type (HCC)      furosemide (LASIX) 20 mg tablet Take 1 tablet (20 mg total) by mouth daily as needed (edema and weight gain)  Qty: 15 tablet, Refills: 3    Associated Diagnoses: Acute on chronic systolic (congestive) heart failure (HCC)      guaiFENesin (MUCINEX) 600 mg 12 hr tablet Take 2 tablets (1,200 mg total) by mouth every 12 (twelve) hours  Qty: 60 tablet, Refills: 6    Associated Diagnoses: Pulmonary emphysema, unspecified emphysema type (HCC)      ipratropium-albuterol (DUO-NEB) 0 5-2 5 mg/3 mL nebulizer solution Take 3 mL by nebulization 4 (four) times a day  Qty: 360 mL, Refills: 5    Associated Diagnoses: COPD, very severe (HCC)      predniSONE 5 mg tablet Take 3-4 tablets daily  Qty: 150 tablet, Refills: 6    Associated Diagnoses: Pulmonary emphysema, unspecified emphysema type (HCC)      rosuvastatin (CRESTOR) 10 MG tablet TAKE 1 TABLET BY MOUTH EVERY DAY  Qty: 90 tablet, Refills: 3    Associated Diagnoses: Pulmonary emphysema, unspecified emphysema type (HCC)           No discharge procedures on file  Prior to Admission Medications   Prescriptions Last Dose Informant Patient Reported? Taking?    albuterol (PROVENTIL HFA,VENTOLIN HFA) 90 mcg/act inhaler   No No   Sig: TAKE 2 PUFFS BY MOUTH EVERY 6 HOURS AS NEEDED FOR WHEEZE OR FOR SHORTNESS OF BREATH   aspirin 81 mg chewable tablet   No No   Sig: Chew 1 tablet (81 mg total) daily   budesonide (Pulmicort) 0 5 mg/2 mL nebulizer solution   No No   Sig: Take 2 mL (0 5 mg total) by nebulization 2 (two) times a day Rinse mouth after use  ergocalciferol (VITAMIN D2) 50,000 units   No No   Sig: TAKE 1 CAPSULE (50,000 UNITS TOTAL) BY MOUTH EVERY 28 DAYS   formoterol (PERFOROMIST) 20 MCG/2ML nebulizer solution   No No   Sig: TAKE 2 ML (20 MCG TOTAL) BY NEBULIZATION 2 (TWO) TIMES A DAY   furosemide (LASIX) 20 mg tablet   No No   Sig: Take 1 tablet (20 mg total) by mouth daily as needed (edema and weight gain)   guaiFENesin (MUCINEX) 600 mg 12 hr tablet   No No   Sig: Take 2 tablets (1,200 mg total) by mouth every 12 (twelve) hours   ipratropium-albuterol (DUO-NEB) 0 5-2 5 mg/3 mL nebulizer solution   No No   Sig: Take 3 mL by nebulization 4 (four) times a day   predniSONE 5 mg tablet   No No   Sig: Take 3-4 tablets daily   rosuvastatin (CRESTOR) 10 MG tablet   No No   Sig: TAKE 1 TABLET BY MOUTH EVERY DAY      Facility-Administered Medications: None                        Portions of the record may have been created with voice recognition software  Occasional wrong word or "sound a like" substitutions may have occurred due to the inherent limitations of voice recognition software  Read the chart carefully and recognize, using context, where substitutions have occurred      Electronically signed by:  Deanna Diaz

## 2023-02-12 NOTE — ED NOTES
Epi gtt titrated to 1 [facility] Kristy Bel, [facility]  02/12/23 017

## 2023-02-12 NOTE — PLAN OF CARE
Problem: SAFETY,RESTRAINT: NV/NON-SELF DESTRUCTIVE BEHAVIOR  Goal: Remains free of harm/injury (restraint for non violent/non self-detsructive behavior)  Description: INTERVENTIONS:  - Instruct patient/family regarding restraint use   - Assess and monitor physiologic and psychological status   - Provide interventions and comfort measures to meet assessed patient needs   - Identify and implement measures to help patient regain control  - Assess readiness for release of restraint   Outcome: Progressing  Goal: Returns to optimal restraint-free functioning  Description: INTERVENTIONS:  - Assess the patient's behavior and symptoms that indicate continued need for restraint  - Identify and implement measures to help patient regain control  - Assess readiness for release of restraint   Outcome: Progressing     Problem: PAIN - ADULT  Goal: Verbalizes/displays adequate comfort level or baseline comfort level  Description: Interventions:  - Encourage patient to monitor pain and request assistance  - Assess pain using appropriate pain scale  - Administer analgesics based on type and severity of pain and evaluate response  - Implement non-pharmacological measures as appropriate and evaluate response  - Consider cultural and social influences on pain and pain management  - Notify physician/advanced practitioner if interventions unsuccessful or patient reports new pain  Outcome: Progressing     Problem: Knowledge Deficit  Goal: Patient/family/caregiver demonstrates understanding of disease process, treatment plan, medications, and discharge instructions  Description: Complete learning assessment and assess knowledge base    Interventions:  - Provide teaching at level of understanding  - Provide teaching via preferred learning methods  Outcome: Progressing

## 2023-02-12 NOTE — RESPIRATORY THERAPY NOTE
RT Ventilator Management Note  Sana Mccrary  74 y o  male MRN: [id number]  Unit/Bed#: [facility] 10 Encounter: [id number]       02/12/23 0732   Respiratory Assessment   Resp Comments Pt and vent now in MICU 10  Vent check post transport completed  Vent alarms on and functional   #7 ET tube 25 at lips  Report given to Cynthia Talley RRT MICU Therapist    O2 Device C 3 Vent   Vent Information   Vent ID [id number]   Vent type Jauregui C3   Jauregui C3/G5 Vent Mode P-CMV/PCV+   $ Pulse Oximetry Spot Check Charge Completed   SpO2 100 %   P-CMV/PCV+ Settings   Resp Rate (BPM) 18 BPM   Pressure Control/Pinsp (cmH20) 24 cmH20   Insp Time (S) 1 sec   FiO2 (%) 50 %   PEEP (cmH2O) (S)  8 cmH2O   Heater Temp 95 °F (35 °C)   P-CMV/PCV+ Actuals   Resp Rate (BPM) 18 BPM   VT (mL) 442 mL   MV 7 8   Peak Pressure (cmH2O) 34 cmH2O   I:E Ratio (Obs) 1:2 3   Heater Temperature (Obs) 93 2 °F (34 °C)   P-CMV/PCV+ Alarms    High Peak Pressure (cmH2O) 45 cmH2O   Low Pressure (cmH2O) 5 cm H2O   High Resp Rate (BPM) 40 BPM   Low Resp Rate (BPM) 5 BPM   High MV (L/min) 20 L/min   Low MV (L/min) 2 L/min   High Spont VTE (mL) 1000 mL   Low Spont VTE (mL) 150 mL   ETT  Oral 7 mm   Placement Date: 02/12/23   Previously placed by?: EMS  Type: Oral  Tube Size: 7 mm  Secured at (cm): 23   Secured at (cm) 25   Measured from Lips   Secured Location Right   Secured by Commercial tube ibarra         Daily Screen    No data found in the last 10 encounters  Physical Exam:   Assessment Type: Assess only  General Appearance: Sedated  Respiratory Pattern: Assisted  Bilateral Breath Sounds: Expiratory wheezes  Suction: Trach, Oral  O2 Device: C 3 Vent      Resp Comments: Pt and vent now in MICU 10  Vent check post transport completed  Vent alarms on and functional   #7 ET tube 25 at lips    Report given to Cynthia Talley, [facility] MICU Therapist

## 2023-02-12 NOTE — ED PROVIDER NOTES
History  Chief Complaint   Patient presents with   • Respiratory Distress     From home  Sick for a few days per wife, was hallucinating yesterday  Gasping upom EMS arrival, unable to speak  Intubated in field     Patient is 28-year-old male presenting from home for respiratory distress, COPD versus CHF exacerbation  Past medical history significant for COPD, CHF (most recent echo shows EF of 40%), KEVIN  Per EMS, patient's wife stated that patient was hallucinating yesterday, began gasping for air and had difficulty speaking  Eventually was unable to speak/breathe, EMS services were called, and on arrival patient was intubated in the field by EMS  Patient had low O2 saturations less than 50% on arrival and improved to 70% SPO2 on nonrebreather  Patient received multiple medications in the field which included Solu-Medrol, magnesium, terbutaline and fluids  On arrival, patient was intubated bilateral breath sounds with wheezing noted  GCS 3 weak 1+ radial pulses  Prior to arrival patient was hypotensive into the 31L systolic and received epi bolus  Patient placed on epi drip when arrived  Patient was received fluids until echo of patient's heart showed decreased EF  Fluids stopped at that time  Patient received multiple boluses of ketamine for sedation  Prior to Admission Medications   Prescriptions Last Dose Informant Patient Reported? Taking? albuterol (PROVENTIL HFA,VENTOLIN HFA) 90 mcg/act inhaler   No No   Sig: TAKE 2 PUFFS BY MOUTH EVERY 6 HOURS AS NEEDED FOR WHEEZE OR FOR SHORTNESS OF BREATH   aspirin 81 mg chewable tablet   No No   Sig: Chew 1 tablet (81 mg total) daily   budesonide (Pulmicort) 0 5 mg/2 mL nebulizer solution   No No   Sig: Take 2 mL (0 5 mg total) by nebulization 2 (two) times a day Rinse mouth after use     ergocalciferol (VITAMIN D2) 50,000 units   No No   Sig: TAKE 1 CAPSULE (50,000 UNITS TOTAL) BY MOUTH EVERY 28 DAYS   formoterol (PERFOROMIST) 20 MCG/2ML nebulizer solution   No No   Sig: TAKE 2 ML (20 MCG TOTAL) BY NEBULIZATION 2 (TWO) TIMES A DAY   furosemide (LASIX) 20 mg tablet   No No   Sig: Take 1 tablet (20 mg total) by mouth daily as needed (edema and weight gain)   guaiFENesin (MUCINEX) 600 mg 12 hr tablet   No No   Sig: Take 2 tablets (1,200 mg total) by mouth every 12 (twelve) hours   ipratropium-albuterol (DUO-NEB) 0 5-2 5 mg/3 mL nebulizer solution   No No   Sig: Take 3 mL by nebulization 4 (four) times a day   predniSONE 5 mg tablet   No No   Sig: Take 3-4 tablets daily   rosuvastatin (CRESTOR) 10 MG tablet   No No   Sig: TAKE 1 TABLET BY MOUTH EVERY DAY      Facility-Administered Medications: None       Past Medical History:   Diagnosis Date   • Acute metabolic encephalopathy 4/60/2104   • Arthritis    • Bladder mass    • Cardiomyopathy [facility])    • Chest pain    • COPD (chronic obstructive pulmonary disease) ([facility])    • CPAP (continuous positive airway pressure) dependence    • Emphysema of lung (HCC)    • Hypoxia     nocturnal   • Left bundle branch block    • Multiple pulmonary nodules     last assessed: 10/12/16   • Pneumonia    • Sleep apnea    • Sleep apnea, obstructive    • Smoker    • Weight loss 8/29/2019       Past Surgical History:   Procedure Laterality Date   • COLONOSCOPY     • CYSTOSCOPY     • CYSTOSCOPY  02/17/2021   • NJ BLADDER INSTILLATION ANTICARCINOGENIC AGENT N/A 1/3/2020    Procedure: INSTILLATION MITOMYCIN;  Surgeon: Maryann Wiseman MD;  Location: [facility];  Service: Urology   • NJ CYSTO W/REMOVAL OF LESIONS SMALL N/A 1/3/2020    Procedure: TRANSURETHRAL RESECTION OF BLADDER TUMOR (TURBT);   Surgeon: Maryann Wiseman MD;  [facility];  Service: Urology   • NJ CYSTOURETHROSCOPY WITH BIOPSY N/A 4/13/2021    Procedure: Marga Guo;  Surgeon: Maryann Wiseman MD;  [facility];  Service: Urology   • NJ TRURL ELECTROSURG RESCJ PROSTATE BLEED COMPLETE N/A 4/13/2021    Procedure: TRANSURETHRAL RESECTION OF PROSTATE (TURP) BLADDER BIOPSY, FULGURATION;  Surgeon: Bea Pallas, MD;  Location[facility];  Service: Urology       Family History   Problem Relation Age of Onset   • Diabetes Mother      I have reviewed and agree with the history as documented  E-Cigarette/Vaping   • E-Cigarette Use Never User      E-Cigarette/Vaping Substances   • Nicotine No    • THC No    • CBD No    • Flavoring No    • Other No    • Unknown No      Social History     Tobacco Use   • Smoking status: Former     Packs/day: 2 50     Years: 42 00     Pack years: 105 00     Types: Cigarettes     Start date: 5     Quit date:      Years since quittin 1   • Smokeless tobacco: Former   • Tobacco comments:     1 ppd for 37 years, 2010 down to 5 cigs a day, is around second hand smoke   Vaping Use   • Vaping Use: Never used   Substance Use Topics   • Alcohol use: Not Currently     Comment: rarely   • Drug use: No        Review of Systems   Unable to perform ROS: Intubated       Physical Exam  ED Triage Vitals   Temperature Pulse Respirations Blood Pressure SpO2   23 0613 23 0511 23 0525 23 0511 23 0510   (!) 95 °F (35 °C) 80 (!) 11 90/51 100 %      Temp Source Heart Rate Source Patient Position - Orthostatic VS BP Location FiO2 (%)   23 0613 23 0900 23 1100 23 1100 23 0510   Rectal Monitor Lying Right arm 50      Pain Score       23 0516       Med Not Given for Pain - for MAR use only             Orthostatic Vital Signs  Vitals:    23 1900 23 2000 23 2100 23 2200   BP: 117/68 113/67 117/71 110/66   Pulse: 88 92 96 92   Patient Position - Orthostatic VS:           Physical Exam  Vitals and nursing note reviewed  Constitutional:       General: He is in acute distress  Comments: Patient is intubated  HENT:      Head: Normocephalic and atraumatic        Right Ear: Tympanic membrane, ear canal and external ear normal       Left Ear: Tympanic membrane, ear canal and external ear normal       Nose: Nose normal       Mouth/Throat:      Mouth: Mucous membranes are moist       Comments: Patient intubated  Eyes:      Conjunctiva/sclera: Conjunctivae normal       Pupils: Pupils are equal, round, and reactive to light  Cardiovascular:      Comments: 1+ radial pulses noted on arrival   Pulmonary:      Breath sounds: Wheezing present  Comments: Bilateral breath sounds with respiratory wheezes  Abdominal:      General: Abdomen is flat  Musculoskeletal:      Right lower leg: Edema present  Left lower leg: Edema present  Comments: Patient has bilateral lower extremity pitting edema 2+  Skin:     General: Skin is warm  Capillary Refill: Capillary refill takes 2 to 3 seconds  Neurological:      Comments: Patient sedated intubated           ED Medications  Medications   EPINEPHrine PF (ADRENALIN) 1 mg/mL injection **ADS Override Pull** (  Canceled Entry 2/12/23 6538)   propofol (DIPRIVAN) 1000 mg in 100 mL infusion (premix) (20 mcg/kg/min × 61 6 kg Intravenous New Bag 2/12/23 1721)   norepinephrine (LEVOPHED) 4 mg (STANDARD CONCENTRATION) IV in sodium chloride 0 9% 250 mL (8 mcg/min Intravenous Rate/Dose Change 2/12/23 2111)   omeprazole (PRILOSEC) suspension 2 mg/mL (20 mg Oral Given 2/12/23 0830)   enoxaparin (LOVENOX) subcutaneous injection 40 mg (40 mg Subcutaneous Given 2/12/23 0830)   chlorhexidine (PERIDEX) 0 12 % oral rinse 15 mL (15 mL Mouth/Throat Given 2/12/23 2056)   fentanyl citrate (PF) 100 MCG/2ML 50 mcg (50 mcg Intravenous Given 2/12/23 1559)   aspirin chewable tablet 81 mg (81 mg Oral Given 2/12/23 0830)   ergocalciferol (VITAMIN D2) capsule 50,000 Units (50,000 Units Oral Not Given 2/12/23 0951)   pravastatin (PRAVACHOL) tablet 80 mg (80 mg Oral Given 2/12/23 1624)   formoterol (PERFOROMIST) nebulizer solution 20 mcg (20 mcg Nebulization Given 2/12/23 2012)   budesonide (PULMICORT) inhalation solution 0 5 mg (0 5 mg Nebulization Given 2/12/23 2013)   methylPREDNISolone sodium succinate (Solu-MEDROL) injection 40 mg (40 mg Intravenous Given 2/12/23 2116)   azithromycin (ZITHROMAX) tablet 500 mg (has no administration in time range)   levalbuterol (XOPENEX) inhalation solution 1 25 mg (1 25 mg Nebulization Given 2/12/23 2013)     And   ipratropium (ATROVENT) 0 02 % inhalation solution 0 5 mg (0 5 mg Nebulization Given 2/12/23 2013)   cefTRIAXone (ROCEPHIN) 1,000 mg in dextrose 5 % 50 mL IVPB (has no administration in time range)   fentanyl citrate (PF) 100 MCG/2ML 50 mcg ( Intravenous Canceled Entry 2/12/23 1624)   midazolam (VERSED) 2 mg/2 mL injection **ADS Override Pull** (  Canceled Entry 2/12/23 1624)   fentanyl citrate (PF) (FOR EMS ONLY) 100 mcg/2 mL injection 100 mcg (0 mcg Does not apply Given to EMS 2/12/23 0524)   ketamine (FOR EMS ONLY) (KETALAR) 100 mg/mL 500 mg (0 mg Does not apply Given to EMS 2/12/23 0523)   magnesium sulfate (FOR EMS ONLY) 2 g/50 mL IVPB (premix) 2 g (0 g Does not apply Given to EMS 2/12/23 0522)   methylPREDNISolone sodium succinate (FOR EMS ONLY) (Solu-MEDROL) 125 MG injection 125 mg (0 mg Does not apply Given to EMS 2/12/23 0522)   midazolam (FOR EMS ONLY) (VERSED) 2 mg/2 mL injection 2 mg (0 mg Does not apply Given to EMS 2/12/23 0524)   terbutaline (FOR EMS ONLY) (BRETHINE) injection 1 mg (0 mg Does not apply Given to EMS 2/12/23 0522)   EPINEPHrine (FOR EMS ONLY) (ADRENALIN) injection 1 mg (0 mg Does not apply Given to EMS 2/12/23 0522)   albuterol inhalation solution 10 mg (10 mg Nebulization Given by Other 2/12/23 0600)     And   ipratropium (ATROVENT) 0 02 % inhalation solution 1 mg (1 mg Nebulization Given by Other 2/12/23 5987)     And   sodium chloride 0 9 % inhalation solution 3 mL (3 mL Nebulization Given by Other 2/12/23 0600)   cefTRIAXone (ROCEPHIN) 2,000 mg in dextrose 5 % 50 mL IVPB (0 mg Intravenous Stopped 2/12/23 0700)   azithromycin (ZITHROMAX) 500 mg in sodium chloride 0 9 % 250 mL IVPB (0 mg Intravenous Stopped 2/12/23 1030)   fentanyl citrate (PF) 100 MCG/2ML 100 mcg (100 mcg Intravenous Given by Other 2/12/23 0516)   ketamine (KETALAR) 100 mg (100 mg Intravenous Given by Other 2/12/23 0515)   furosemide (LASIX) injection 40 mg (40 mg Intravenous Given 2/12/23 0826)   perflutren lipid microsphere (DEFINITY) injection (0 8 mL/min Intravenous Given 2/12/23 0920)   albumin human (FLEXBUMIN) 25 % injection 25 g (25 g Intravenous New Bag 2/12/23 1714)   midazolam (VERSED) injection 2 mg (2 mg Intravenous Given 2/12/23 1600)       Diagnostic Studies  Results Reviewed     Procedure Component Value Units Date/Time    Strep Pneumoniae, Urine [[id number]]  (Normal) Collected: 02/12/23 0803    Lab Status: Final result Specimen: Urine, Catheter Updated: 02/12/23 1525     Strep pneumoniae antigen, urine Negative    Legionella antigen, urine [[id number]]  (Normal) Collected: 02/12/23 0803    Lab Status: Final result Specimen: Urine, Catheter Updated: 02/12/23 1524     Legionella Urinary Antigen Negative    Osmolality-"If this is regarding a toxic alcohol, STOP  Test is not routinely indicated  Please consult medical  on call for further guidance " [[id number]]  (Abnormal) Collected: 02/12/23 0533    Lab Status: Final result Specimen: Blood from Arm, Right Updated: 02/12/23 1508     Osmolality Serum 276 mmol/KG     Blood culture #1 [[id number]] Collected: 02/12/23 0529    Lab Status: Preliminary result Specimen: Blood from Arm, Left Updated: 02/12/23 1501     Blood Culture Received in Microbiology Lab  Culture in Progress  Blood culture #2 [[id number]] Collected: 02/12/23 0529    Lab Status: Preliminary result Specimen: Blood from Arm, Right Updated: 02/12/23 1501     Blood Culture Received in Microbiology Lab  Culture in Progress      HS Troponin I 4hr [[id number]]  (Abnormal) Collected: 02/12/23 0944    Lab Status: Final result Specimen: Blood from Line, Arterial Updated: 02/12/23 1033 hs TnI 4hr 53 ng/L      Delta 4hr hsTnI 11 ng/L     Lactate dehydrogenase [[id number]]  (Normal) Collected: 02/12/23 0529    Lab Status: Final result Specimen: Blood from Arm, Right Updated: 02/12/23 0945      U/L     Iron Saturation % [[id number]]  (Abnormal) Collected: 02/12/23 0529    Lab Status: Final result Specimen: Blood from Arm, Right Updated: 02/12/23 0945     Iron Saturation 12 %      TIBC 269 ug/dL      Iron 32 ug/dL     Ferritin [[id number]]  (Normal) Collected: 02/12/23 0529    Lab Status: Final result Specimen: Blood from Arm, Right Updated: 02/12/23 0945     Ferritin 209 ng/mL     Sputum culture and Gram stain [[id number]] Collected: 02/12/23 0834    Lab Status: In process Specimen: Induced Sputum Updated: 02/12/23 0846    HS Troponin I 2hr [[id number]]  (Abnormal) Collected: 02/12/23 0759    Lab Status: Final result Specimen: Blood from Line, Arterial Updated: 02/12/23 0837     hs TnI 2hr 52 ng/L      Delta 2hr hsTnI 10 ng/L     Respiratory Panel 2  1(RP2)with COVID19 [[id number]]  (Normal) Collected: 02/12/23 0627    Lab Status: Final result Specimen: Nasopharyngeal Swab Updated: 02/12/23 0827     Adenovirus Not Detected     Bordetella parapertussis Not Detected     Bordetella pertussis Not Detected     Chlamydia pneumoniae Not Detected     SARS-CoV-2 Not Detected     Coronavirus 229E Not Detected     Coronavirus HKU1 Not Detected     Coronavirus NL63 Not Detected     Coronavirus OC43 Not Detected     Human Metapneumovirus Not Detected     Rhino/Enterovirus Not Detected     Influenza A Not Detected     Influenza B Not Detected     Mycoplasma pneumoniae Not Detected     Parainfluenza 1 Not Detected     Parainfluenza 2 Not Detected     Parainfluenza 3 Not Detected     Parainfluenza 4 Not Detected     Respiratory Syncytial Virus Not Detected    Sodium, urine, random [[id number]] Collected: 02/12/23 0803    Lab Status: Final result Specimen: Urine, Catheter Updated: 02/12/23 0826     Sodium, Ur <5 Creatinine, urine, random [[id number]] Collected: 02/12/23 0803    Lab Status: Final result Specimen: Urine, Catheter Updated: 02/12/23 0826     Creatinine, Ur 114 0 mg/dL     Chloride, urine, random [[id number]] Collected: 02/12/23 0803    Lab Status: Final result Specimen: Urine, Catheter Updated: 02/12/23 0826     Chloride, Ur <10 mmol/L     Osmolality, urine [[id number]]  (Normal) Collected: 02/12/23 0802    Lab Status: Final result Specimen: Urine, Catheter Updated: 02/12/23 0816     Osmolality, Ur 502 mmol/KG     Platelet count [[id number]]  (Normal) Collected: 02/12/23 0759    Lab Status: Final result Specimen: Blood from Line, Arterial Updated: 02/12/23 0813     Platelets 923 Thousands/uL      MPV 9 8 fL     Procalcitonin [[id number]]  (Normal) Collected: 02/12/23 0529    Lab Status: Final result Specimen: Blood from Arm, Right Updated: 02/12/23 0736     Procalcitonin 0 05 ng/ml     NT-BNP PRO-BE,[facility],[place] campuses only [[id number]]  (Abnormal) Collected: 02/12/23 0529    Lab Status: Final result Specimen: Blood from Arm, Right Updated: 02/12/23 0629     NT-proBNP 6,786 pg/mL     Blood gas, arterial [[id number]]  (Abnormal) Collected: 02/12/23 0549    Lab Status: Final result Specimen: Blood, Arterial from Line, Arterial Updated: 02/12/23 0621     pH, Arterial 7 208     pCO2, Arterial 103 1 mm Hg      pO2, Arterial 412 0 mm Hg      HCO3, Arterial 40 1 mmol/L      Base Excess, Arterial 9 3 mmol/L      O2 Content, Arterial 15 8 mL/dL      O2 HGB,Arterial  97 6 %      SOURCE Line, Arterial    Comprehensive metabolic panel [[id number]]  (Abnormal) Collected: 02/12/23 0529    Lab Status: Final result Specimen: Blood from Arm, Right Updated: 02/12/23 2112     Sodium 129 mmol/L      Potassium 4 0 mmol/L      Chloride 86 mmol/L      CO2 39 mmol/L      ANION GAP 4 mmol/L      BUN 20 mg/dL      Creatinine 0 56 mg/dL      Glucose 114 mg/dL      Calcium 8 2 mg/dL      Corrected Calcium 9 1 mg/dL      AST 39 U/L      ALT 24 U/L      Alkaline Phosphatase 64 U/L      Total Protein 5 3 g/dL      Albumin 2 9 g/dL      Total Bilirubin 0 51 mg/dL      eGFR 108 ml/min/1 73sq m     Narrative:      [place] guidelines for Chronic Kidney Disease (CKD):   •  Stage 1 with normal or high GFR (GFR > 90 mL/min/1 73 square meters)  •  Stage 2 Mild CKD (GFR = 60-89 mL/min/1 73 square meters)  •  Stage 3A Moderate CKD (GFR = 45-59 mL/min/1 73 square meters)  •  Stage 3B Moderate CKD (GFR = 30-44 mL/min/1 73 square meters)  •  Stage 4 Severe CKD (GFR = 15-29 mL/min/1 73 square meters)  •  Stage 5 End Stage CKD (GFR <15 mL/min/1 73 square meters)  Note: GFR calculation is accurate only with a steady state creatinine    HS Troponin 0hr (reflex protocol) [[id number]]  (Normal) Collected: 02/12/23 0533    Lab Status: Final result Specimen: Blood from Arm, Right Updated: 02/12/23 0619     hs TnI 0hr 42 ng/L     Lactic acid [[id number]]  (Normal) Collected: 02/12/23 0529    Lab Status: Final result Specimen: Blood from Arm, Right Updated: 02/12/23 6235     LACTIC ACID 1 2 mmol/L     Narrative:      Result may be elevated if tourniquet was used during collection      Fingerstick Glucose (POCT) [[id number]]  (Normal) Collected: 02/12/23 0529    Lab Status: Final result Updated: 02/12/23 0612     POC Glucose 100 mg/dl     APTT [[id number]]  (Normal) Collected: 02/12/23 0529    Lab Status: Final result Specimen: Blood from Arm, Right Updated: 02/12/23 0559     PTT 27 seconds     Protime-INR [[id number]]  (Normal) Collected: 02/12/23 0529    Lab Status: Final result Specimen: Blood from Arm, Right Updated: 02/12/23 0559     Protime 12 6 seconds      INR 0 93    CBC and differential [[id number]]  (Abnormal) Collected: 02/12/23 0529    Lab Status: Final result Specimen: Blood from Arm, Right Updated: 02/12/23 0546     WBC 7 24 Thousand/uL      RBC 2 80 Million/uL      Hemoglobin 8 9 g/dL      Hematocrit 28 5 %       fL      MCH 31 8 pg MCHC 31 2 g/dL      RDW 12 2 %      MPV 9 7 fL      Platelets 773 Thousands/uL      nRBC 0 /100 WBCs      Neutrophils Relative 73 %      Immat GRANS % 0 %      Lymphocytes Relative 10 %      Monocytes Relative 17 %      Eosinophils Relative 0 %      Basophils Relative 0 %      Neutrophils Absolute 5 27 Thousands/µL      Immature Grans Absolute 0 03 Thousand/uL      Lymphocytes Absolute 0 71 Thousands/µL      Monocytes Absolute 1 21 Thousand/µL      Eosinophils Absolute 0 01 Thousand/µL      Basophils Absolute 0 01 Thousands/µL     UA w Reflex to Microscopic w Reflex to Culture [[id number]] Collected: 02/12/23 0544    Lab Status: No result Specimen: Urine, Indwelling Stanley Catheter                  CT head wo contrast   Final Result by Karyn Goncalves MD (02/12 2130)      No acute intracranial abnormality  Workstation performed: [id number]         XR chest 1 view portable   ED Interpretation by Deon Hardy MD (02/12 6009)   Increased cephalization on LEFT > RIGHT  ITubated       Final Result by Lee Ann Villagran MD (02/12 4038)      Moderate pulmonary venous congestion  ET tube 6 cm above the ghada  Workstation performed: [id number]         XR chest portable    (Results Pending)         Procedures  Arterial Line    Date/Time: 2/12/2023 5:56 AM  Performed by: Griselda Wheeler MD  Authorized by: Griselda Wheeler MD     Patient location:  ED  Procedure performed by consultant: Dr Kan Amos  Consent:     Consent obtained:  Emergent situation  Universal protocol:     Patient identity confirmed:  Arm band  Indications:     Indications: hemodynamic monitoring, multiple ABGs, continuous blood pressure monitoring and frequent labs / infusion    Pre-procedure details:     Skin preparation:  Chlorhexidine    Preparation: Patient was prepped and draped in sterile fashion    Sedation:     Sedation type: Patient sedated and intubated    Anesthesia (see MAR for exact dosages): Anesthesia method:  None  Procedure details:     Location / Tip of Catheter:  Radial    Laterality:  Left    Kelby's test performed: no      Placement technique:  Ultrasound guided    Ultrasound image availability:  Not saved    Sterile ultrasound techniques: Sterile gel and sterile probe covers were used      Number of attempts:  1    Successful placement: yes      Transducer: waveform confirmed    Post-procedure details:     Post-procedure:  Sterile dressing applied and sutured    CMS:  Normal    Patient tolerance of procedure: Tolerated well, no immediate complications          ED Course                                       Medical Decision Making  Patient 60-year-old male presenting for respiratory distress  DDx: COPD exacerbation, CHF exacerbation, aspiration/pneumonia, sepsis, ACS  Patient arrived intubated via EMS services after receiving Solu-Medrol, terbutaline, nebulizers, ketamine, epi in the field  Patient placed on epi drip, propofol for sedation, boluses of ketamine given as well, nebulizers started immediately on arrival   Full septic work-up ordered, cardiac work-up, chest x-ray, fluids stopped as concern for COPD versus CHF exacerbation following echo that showed decreased EF/hypokinesis  Empiric antibiotics ordered with ceftriaxone and azithromycin  Critical care immediately consulted, respiratory at bedside  Plan admit to ICU for further eval and management  Acute respiratory failure [facility]): acute illness or injury  CHF (congestive heart failure) ([facility] ): acute illness or injury  COPD (chronic obstructive pulmonary disease) ([facility] ): acute illness or injury  Amount and/or Complexity of Data Reviewed  Labs: ordered  Radiology: ordered and independent interpretation performed  Risk  Prescription drug management  Decision regarding hospitalization              Disposition  Final diagnoses:   Acute respiratory failure ([facility] )   COPD (chronic obstructive pulmonary disease) (HCC)   CHF (congestive heart failure) ([place] [address] )     Time reflects when diagnosis was documented in both MDM as applicable and the Disposition within this note     Time User Action Codes Description Comment    2/12/2023  6:31 AM Ana Foremancks Add [J96 21,  J96 22] Acute on chronic respiratory failure with hypoxia and hypercapnia ([place] [address] )     2/12/2023  6:31 AM Diaz Distel Add [J96 00] Acute respiratory failure ([place] [address] )     2/12/2023  6:32 AM Diaz Distel Add [J44 9] COPD (chronic obstructive pulmonary disease) ([place] [address] )     2/12/2023  6:32 AM Diaz Distel Add [I50 9] CHF (congestive heart failure) ([place] [address] )     2/12/2023  5:06 PM Sonja Redder Add [R57 9] [facility])       ED Disposition     ED Disposition   Admit    Condition   Stable    Date/Time   Sun Feb 12, 2023  6:31 AM    Comment   Case was discussed with Dr Fred Garcia and the patient's admission status was agreed to be Admission Status: inpatient status to the service of Dr Fred Garcia   Follow-up Information    None         Current Discharge Medication List      CONTINUE these medications which have NOT CHANGED    Details   albuterol (PROVENTIL HFA,VENTOLIN HFA) 90 mcg/act inhaler TAKE 2 PUFFS BY MOUTH EVERY 6 HOURS AS NEEDED FOR WHEEZE OR FOR SHORTNESS OF BREATH  Qty: 8 5 g, Refills: 11    Comments: DX Code Needed    Associated Diagnoses: Chronic obstructive pulmonary disease, unspecified COPD type (HCC)      aspirin 81 mg chewable tablet Chew 1 tablet (81 mg total) daily  Qty: 30 tablet, Refills: 0    Associated Diagnoses: Acute respiratory failure with hypoxia and hypercapnia (HCC); COPD with acute exacerbation (HCC)      budesonide (Pulmicort) 0 5 mg/2 mL nebulizer solution Take 2 mL (0 5 mg total) by nebulization 2 (two) times a day Rinse mouth after use    Qty: 120 mL, Refills: 5    Associated Diagnoses: Pulmonary emphysema, unspecified emphysema type (HCC)      ergocalciferol (VITAMIN D2) 50,000 units TAKE 1 CAPSULE (50,000 UNITS TOTAL) BY MOUTH EVERY 28 DAYS  Qty: 3 capsule, Refills: 1    Associated Diagnoses: Localized osteoporosis without current pathological fracture; Vitamin D deficiency      formoterol (PERFOROMIST) 20 MCG/2ML nebulizer solution TAKE 2 ML (20 MCG TOTAL) BY NEBULIZATION 2 (TWO) TIMES A DAY  Qty: 120 mL, Refills: 5    Associated Diagnoses: Pulmonary emphysema, unspecified emphysema type (HCC)      furosemide (LASIX) 20 mg tablet Take 1 tablet (20 mg total) by mouth daily as needed (edema and weight gain)  Qty: 15 tablet, Refills: 3    Associated Diagnoses: Acute on chronic systolic (congestive) heart failure (HCC)      guaiFENesin (MUCINEX) 600 mg 12 hr tablet Take 2 tablets (1,200 mg total) by mouth every 12 (twelve) hours  Qty: 60 tablet, Refills: 6    Associated Diagnoses: Pulmonary emphysema, unspecified emphysema type (HCC)      ipratropium-albuterol (DUO-NEB) 0 5-2 5 mg/3 mL nebulizer solution Take 3 mL by nebulization 4 (four) times a day  Qty: 360 mL, Refills: 5    Associated Diagnoses: COPD, very severe (HCC)      predniSONE 5 mg tablet Take 3-4 tablets daily  Qty: 150 tablet, Refills: 6    Associated Diagnoses: Pulmonary emphysema, unspecified emphysema type (HCC)      rosuvastatin (CRESTOR) 10 MG tablet TAKE 1 TABLET BY MOUTH EVERY DAY  Qty: 90 tablet, Refills: 3    Associated Diagnoses: Pulmonary emphysema, unspecified emphysema type (HCC)           No discharge procedures on file  PDMP Review       Value Time User    PDMP Reviewed  Yes 2/12/2023 10:49 AM Frances Perez MD           ED Provider  Attending physically available and evaluated Tayo Olivia SIFUENTES managed the patient along with the ED Attending      Electronically Signed by         Lalito Titus MD  02/12/23 8215

## 2023-02-12 NOTE — PROGRESS NOTES
Pastoral Care Progress Note    2023  Patient: Guanako Cuba  : 1955  Admission Date & Time: 2023 0507  MRN: [id number] [facility]: [id number]           23 0700   Clinical Encounter Type   Visited With Patient and family together;Patient not available   Routine Visit Introduction   Continue Visiting Yes   Crisis Visit ED   Referral From Nurse   Referral To Christiano Boston was referred to pt and pt's wife via TT  When I got down to the ED I knocked on the pt's door and introduced myself  The pt was intubated and getting care from the medical staff  The pt's wife, Kameron Brunson, was standing bedside and visibly upset  I stood next to her and held her hand and asked her what had been going on  She told me that everything happened unexpectedly and expressed great fear for her 's current condition  She told me that they had been together for over 15 years and was very scared about how his health had been deteriorating over the past week  I affirmed her fears and held space for her hopes for her 's condition  I got the pt's wife a cup of coffee as she had told me she hadn't slept since 6AM yesterday ()  I prayed with her per request and offered her words of comfort and encouragement  Medical staff notified her that her  would be moved to ICU within the next few hours  I assured her that someone from [facility] would be around to check back in with her once her  had been moved to a new room  I told her not to hesitate to ask the nurses for a  if she feels she could benefit from our services again  No further needs expressed at this time  Chaplains still remain available                  Chaplaincy Interventions Utilized:   Empowerment: Clarified, confirmed, or reviewed information from treatment team , Encouraged focus on present, Provided anticipatory guidance, Provided anxiety containment, and Provided grief counseling    Exploration: Explored hope, Explored emotional needs & resources, Explored relational needs & resources, Explored spiritual needs & resources, Facilitated expression of regret, Facilitated life review, and Facilitated story telling    Collaboration: Consulted with interdisciplinary team and Encouraged adherence to treatment plan    Relationship Building: Cultivated a relationship of care and support, Listened empathically, Hospitality, and Provided silent and supportive presence    Ritual: Provided prayer      Chaplaincy Outcomes Achieved:  Debriefed/defused experience, Emotional resources utilized, Expressed gratitude, Expressed intermediate hope, Identified meaningful connections, Identified priorities, Progressed toward balance of responsibility & trust , Progressed toward focus on present, Relational resources utilized, Spiritual resources utilized, Tearfully processed emotions, and Verbally processed emotions      Spiritual Coping Strategies Utilized:   Connectedness, Spiritual practices, Spiritual comfort, Spiritual empowerment, Surrender, Positive spiritual reframing, and Spiritual struggle

## 2023-02-13 ENCOUNTER — APPOINTMENT (INPATIENT)
Dept: RADIOLOGY | Facility: HOSPITAL | Age: 68
End: 2023-02-13

## 2023-02-13 LAB
ALBUMIN SERPL BCP-MCNC: 2.9 G/DL (ref 3.5–5)
ALP SERPL-CCNC: 53 U/L (ref 46–116)
ALT SERPL W P-5'-P-CCNC: 22 U/L (ref 12–78)
ANION GAP SERPL CALCULATED.3IONS-SCNC: 6 MMOL/L (ref 4–13)
AST SERPL W P-5'-P-CCNC: 44 U/L (ref 5–45)
BACTERIA UR QL AUTO: ABNORMAL /HPF
BASE EX.OXY STD BLDV CALC-SCNC: 72.1 % (ref 60–80)
BASE EXCESS BLDA CALC-SCNC: 15.1 MMOL/L
BASE EXCESS BLDA CALC-SCNC: 17.3 MMOL/L
BASE EXCESS BLDV CALC-SCNC: 14.1 MMOL/L
BASOPHILS # BLD AUTO: 0 THOUSANDS/ÂΜL (ref 0–0.1)
BASOPHILS NFR BLD AUTO: 0 % (ref 0–1)
BILIRUB SERPL-MCNC: 0.63 MG/DL (ref 0.2–1)
BILIRUB UR QL STRIP: NEGATIVE
BUN SERPL-MCNC: 20 MG/DL (ref 5–25)
CA-I BLD-SCNC: 1.02 MMOL/L (ref 1.12–1.32)
CALCIUM ALBUM COR SERPL-MCNC: 9.1 MG/DL (ref 8.3–10.1)
CALCIUM SERPL-MCNC: 8.2 MG/DL (ref 8.3–10.1)
CHLORIDE SERPL-SCNC: 85 MMOL/L (ref 96–108)
CLARITY UR: ABNORMAL
CO2 SERPL-SCNC: 38 MMOL/L (ref 21–32)
COLOR UR: ABNORMAL
CREAT SERPL-MCNC: 0.51 MG/DL (ref 0.6–1.3)
EOSINOPHIL # BLD AUTO: 0.01 THOUSAND/ÂΜL (ref 0–0.61)
EOSINOPHIL NFR BLD AUTO: 0 % (ref 0–6)
ERYTHROCYTE [DISTWIDTH] IN BLOOD BY AUTOMATED COUNT: 12.5 % (ref 11.6–15.1)
GFR SERPL CREATININE-BSD FRML MDRD: 112 ML/MIN/1.73SQ M
GLUCOSE SERPL-MCNC: 139 MG/DL (ref 65–140)
GLUCOSE UR STRIP-MCNC: NEGATIVE MG/DL
HCO3 BLDA-SCNC: 40.6 MMOL/L (ref 22–28)
HCO3 BLDA-SCNC: 42.2 MMOL/L (ref 22–28)
HCO3 BLDV-SCNC: 39.8 MMOL/L (ref 24–30)
HCT VFR BLD AUTO: 25.7 % (ref 36.5–49.3)
HGB BLD-MCNC: 8.1 G/DL (ref 12–17)
HGB UR QL STRIP.AUTO: ABNORMAL
HOROWITZ INDEX BLDA+IHG-RTO: 40 MM[HG]
IMM GRANULOCYTES # BLD AUTO: 0.03 THOUSAND/UL (ref 0–0.2)
IMM GRANULOCYTES NFR BLD AUTO: 0 % (ref 0–2)
KETONES UR STRIP-MCNC: NEGATIVE MG/DL
LEUKOCYTE ESTERASE UR QL STRIP: ABNORMAL
LYMPHOCYTES # BLD AUTO: 0.3 THOUSANDS/ÂΜL (ref 0.6–4.47)
LYMPHOCYTES NFR BLD AUTO: 3 % (ref 14–44)
MAGNESIUM SERPL-MCNC: 2 MG/DL (ref 1.6–2.6)
MCH RBC QN AUTO: 31 PG (ref 26.8–34.3)
MCHC RBC AUTO-ENTMCNC: 31.5 G/DL (ref 31.4–37.4)
MCV RBC AUTO: 99 FL (ref 82–98)
MONOCYTES # BLD AUTO: 1.14 THOUSAND/ÂΜL (ref 0.17–1.22)
MONOCYTES NFR BLD AUTO: 10 % (ref 4–12)
NEUTROPHILS # BLD AUTO: 9.43 THOUSANDS/ÂΜL (ref 1.85–7.62)
NEUTS SEG NFR BLD AUTO: 87 % (ref 43–75)
NITRITE UR QL STRIP: NEGATIVE
NON-SQ EPI CELLS URNS QL MICRO: ABNORMAL /HPF
NRBC BLD AUTO-RTO: 0 /100 WBCS
O2 CT BLDA-SCNC: 12.6 ML/DL (ref 16–23)
O2 CT BLDA-SCNC: 12.9 ML/DL (ref 16–23)
O2 CT BLDV-SCNC: 9.6 ML/DL
OXYHGB MFR BLDA: 95.4 % (ref 94–97)
OXYHGB MFR BLDA: 97 % (ref 94–97)
PCO2 BLDA: 52.6 MM HG (ref 36–44)
PCO2 BLDA: 57 MM HG (ref 36–44)
PCO2 BLDV: 57.5 MM HG (ref 42–50)
PEEP RESPIRATORY: 6 CM[H2O]
PH BLDA: 7.47 [PH] (ref 7.35–7.45)
PH BLDA: 7.52 [PH] (ref 7.35–7.45)
PH BLDV: 7.46 [PH] (ref 7.3–7.4)
PH UR STRIP.AUTO: 6 [PH]
PHOSPHATE SERPL-MCNC: 3.1 MG/DL (ref 2.3–4.1)
PLATELET # BLD AUTO: 208 THOUSANDS/UL (ref 149–390)
PMV BLD AUTO: 10.4 FL (ref 8.9–12.7)
PO2 BLDA: 100 MM HG (ref 75–129)
PO2 BLDA: 79.8 MM HG (ref 75–129)
PO2 BLDV: 39.7 MM HG (ref 35–45)
POTASSIUM SERPL-SCNC: 3.5 MMOL/L (ref 3.5–5.3)
PROCALCITONIN SERPL-MCNC: 0.87 NG/ML
PROT SERPL-MCNC: 5.2 G/DL (ref 6.4–8.4)
PROT UR STRIP-MCNC: ABNORMAL MG/DL
RBC # BLD AUTO: 2.61 MILLION/UL (ref 3.88–5.62)
RBC #/AREA URNS AUTO: ABNORMAL /HPF
SIMV VENT INSPIRED AIR FIO2: 40
SIMV VENT PEEP: 6
SIMV VENT TIDAL VOLUME: 350
SIMV VENT: ABNORMAL
SODIUM SERPL-SCNC: 129 MMOL/L (ref 135–147)
SP GR UR STRIP.AUTO: 1.02 (ref 1–1.03)
SPECIMEN SOURCE: ABNORMAL
SPECIMEN SOURCE: ABNORMAL
UROBILINOGEN UR STRIP-ACNC: <2 MG/DL
VENT AC: 16
VENT- AC: AC
VT SETTING VENT: 350 ML
WBC # BLD AUTO: 10.91 THOUSAND/UL (ref 4.31–10.16)
WBC #/AREA URNS AUTO: ABNORMAL /HPF

## 2023-02-13 RX ORDER — ALBUMIN, HUMAN INJ 5% 5 %
25 SOLUTION INTRAVENOUS ONCE
Status: COMPLETED | OUTPATIENT
Start: 2023-02-13 | End: 2023-02-13

## 2023-02-13 RX ORDER — FUROSEMIDE 10 MG/ML
20 INJECTION INTRAMUSCULAR; INTRAVENOUS ONCE
Status: DISCONTINUED | OUTPATIENT
Start: 2023-02-13 | End: 2023-02-13

## 2023-02-13 RX ORDER — DEXMEDETOMIDINE HYDROCHLORIDE 4 UG/ML
.1-.7 INJECTION, SOLUTION INTRAVENOUS
Status: DISCONTINUED | OUTPATIENT
Start: 2023-02-13 | End: 2023-02-16

## 2023-02-13 RX ADMIN — METHYLPREDNISOLONE SODIUM SUCCINATE 40 MG: 40 INJECTION, POWDER, FOR SOLUTION INTRAMUSCULAR; INTRAVENOUS at 15:25

## 2023-02-13 RX ADMIN — NOREPINEPHRINE BITARTRATE 10 MCG/MIN: 1 INJECTION, SOLUTION, CONCENTRATE INTRAVENOUS at 09:49

## 2023-02-13 RX ADMIN — ALBUMIN (HUMAN) 25 G: 12.5 INJECTION, SOLUTION INTRAVENOUS at 17:38

## 2023-02-13 RX ADMIN — PROPOFOL 30 MCG/KG/MIN: 10 INJECTION, EMULSION INTRAVENOUS at 10:15

## 2023-02-13 RX ADMIN — FORMOTEROL FUMARATE DIHYDRATE 20 MCG: 20 SOLUTION RESPIRATORY (INHALATION) at 08:08

## 2023-02-13 RX ADMIN — LEVALBUTEROL HYDROCHLORIDE 1.25 MG: 1.25 SOLUTION, CONCENTRATE RESPIRATORY (INHALATION) at 13:55

## 2023-02-13 RX ADMIN — BUDESONIDE 0.5 MG: 0.5 INHALANT ORAL at 19:41

## 2023-02-13 RX ADMIN — BUDESONIDE 0.5 MG: 0.5 INHALANT ORAL at 08:08

## 2023-02-13 RX ADMIN — NOREPINEPHRINE BITARTRATE 8 MCG/MIN: 1 INJECTION, SOLUTION, CONCENTRATE INTRAVENOUS at 17:35

## 2023-02-13 RX ADMIN — ASPIRIN 81 MG CHEWABLE TABLET 81 MG: 81 TABLET CHEWABLE at 08:01

## 2023-02-13 RX ADMIN — METHYLPREDNISOLONE SODIUM SUCCINATE 40 MG: 40 INJECTION, POWDER, FOR SOLUTION INTRAMUSCULAR; INTRAVENOUS at 05:00

## 2023-02-13 RX ADMIN — PRAVASTATIN SODIUM 80 MG: 20 TABLET ORAL at 16:08

## 2023-02-13 RX ADMIN — FENTANYL CITRATE 50 MCG: 50 INJECTION INTRAMUSCULAR; INTRAVENOUS at 07:39

## 2023-02-13 RX ADMIN — VASOPRESSIN 0.04 UNITS/MIN: 20 INJECTION INTRAVENOUS at 18:34

## 2023-02-13 RX ADMIN — LEVALBUTEROL HYDROCHLORIDE 1.25 MG: 1.25 SOLUTION, CONCENTRATE RESPIRATORY (INHALATION) at 08:08

## 2023-02-13 RX ADMIN — CEFTRIAXONE 1000 MG: 1 INJECTION, POWDER, FOR SOLUTION INTRAMUSCULAR; INTRAVENOUS at 07:10

## 2023-02-13 RX ADMIN — AZITHROMYCIN MONOHYDRATE 500 MG: 250 TABLET ORAL at 08:01

## 2023-02-13 RX ADMIN — LEVALBUTEROL HYDROCHLORIDE 1.25 MG: 1.25 SOLUTION, CONCENTRATE RESPIRATORY (INHALATION) at 19:41

## 2023-02-13 RX ADMIN — NOREPINEPHRINE BITARTRATE 8 MCG/MIN: 1 INJECTION, SOLUTION, CONCENTRATE INTRAVENOUS at 01:35

## 2023-02-13 RX ADMIN — METHYLPREDNISOLONE SODIUM SUCCINATE 40 MG: 40 INJECTION, POWDER, FOR SOLUTION INTRAMUSCULAR; INTRAVENOUS at 22:18

## 2023-02-13 RX ADMIN — PROPOFOL 20 MCG/KG/MIN: 10 INJECTION, EMULSION INTRAVENOUS at 02:32

## 2023-02-13 RX ADMIN — CHLORHEXIDINE GLUCONATE 0.12% ORAL RINSE 15 ML: 1.2 LIQUID ORAL at 22:12

## 2023-02-13 RX ADMIN — FENTANYL CITRATE 50 MCG: 50 INJECTION INTRAMUSCULAR; INTRAVENOUS at 19:49

## 2023-02-13 RX ADMIN — PROPOFOL 25 MCG/KG/MIN: 10 INJECTION, EMULSION INTRAVENOUS at 17:24

## 2023-02-13 RX ADMIN — IPRATROPIUM BROMIDE 0.5 MG: 0.5 SOLUTION RESPIRATORY (INHALATION) at 08:08

## 2023-02-13 RX ADMIN — FENTANYL CITRATE 50 MCG: 50 INJECTION INTRAMUSCULAR; INTRAVENOUS at 04:49

## 2023-02-13 RX ADMIN — ENOXAPARIN SODIUM 40 MG: 40 INJECTION SUBCUTANEOUS at 08:01

## 2023-02-13 RX ADMIN — IPRATROPIUM BROMIDE 0.5 MG: 0.5 SOLUTION RESPIRATORY (INHALATION) at 19:41

## 2023-02-13 RX ADMIN — CHLORHEXIDINE GLUCONATE 0.12% ORAL RINSE 15 ML: 1.2 LIQUID ORAL at 08:01

## 2023-02-13 RX ADMIN — FENTANYL CITRATE 50 MCG: 50 INJECTION INTRAMUSCULAR; INTRAVENOUS at 22:12

## 2023-02-13 RX ADMIN — Medication 20 MG: at 08:01

## 2023-02-13 RX ADMIN — IPRATROPIUM BROMIDE 0.5 MG: 0.5 SOLUTION RESPIRATORY (INHALATION) at 13:55

## 2023-02-13 RX ADMIN — VASOPRESSIN 0.03 UNITS/MIN: 20 INJECTION INTRAVENOUS at 12:25

## 2023-02-13 RX ADMIN — DEXMEDETOMIDINE HYDROCHLORIDE 0.1 MCG/KG/HR: 4 INJECTION, SOLUTION INTRAVENOUS at 12:07

## 2023-02-13 RX ADMIN — FORMOTEROL FUMARATE DIHYDRATE 20 MCG: 20 SOLUTION RESPIRATORY (INHALATION) at 19:41

## 2023-02-13 NOTE — PROGRESS NOTES
Progress note - Palliative and Supportive Care   Najma Mcneil  74 y o  male [id number]    Patient Active Problem List   Diagnosis   • Coronary artery disease   • Cardiomyopathy, dilated (HCC)   • Chronic obstructive pulmonary disease (HCC)   • Acute systolic congestive heart failure (HCC)   • Left bundle-branch block   • KEVIN and COPD overlap syndrome (HCC)   • Hypercholesterolemia   • Gastroesophageal reflux disease   • Impaired fasting glucose   • Osteoarthritis   • Osteoporosis   • Vitamin D deficiency   • Mood disorder (HCC)   • Other insomnia   • Macrocytosis without anemia   • Chronic hypoxemic respiratory failure (HCC)   • BPH with obstruction/lower urinary tract symptoms   • Prostate cancer ([facility] )   • Pulmonary nodules/lesions, multiple   • Former tobacco use   • Mild protein-calorie malnutrition (HCC)   • Chronic HFrEF (heart failure with reduced ejection fraction) ([facility] )     Active issues specifically addressed today include:   -acute on chronic hypoxemic respiratory failure, intubated [02/12/2023]  -end-stage COPD  -HFrEF [EF 40%, TTE 08/26/2022]  -goals of care support  -palliative care encounter    Plan:  1  Symptom management   -per primary team    2  Goals    Code Status: FULL - Level 1   Decisional apparatus:  Patient is not competent on my exam today  If competence is lost, patient's substitute decision maker would default to spouse [place]  Advance Directive / Living Will / POLST:  None on file  3  Psychosocial   • Emotional support provided   • Patient supported by spouse, Kat Burns ( 15 yrs), two adopted children (son-lives [facility], daughter-lives in [place]), niece Eulogio Ta who lives in [place]     Interval history:  Patient admitted yesterday (2/12/23) for acute on chronic hypoxemic respiratory failure, intubated in the field prior to arrival to the hospital and now in the ICU   In the field, patient was found to be hypoxic in the 50s and hypotensive, started on epi gtt in the ED and eventually weaned off  ABG upon arrival to ED 7 21/103  Patient remains intubated and sedated - concern for possible anoxic brain injury  Overnight, patient was able to follow simple commands while propofol was held temporarily, but he became increasingly agitated and increased sedation was thus required  Patient also now on norepi gtt, hypotension thought to be 2/2 to sedation  Per review of prior records, patient had started discussion of hospice w/ outpatient pulmonologist Dr Kermit Love on 1/17/23 due to end-stage COPD with worsening fatigue and decline at that point  He did not feel he was appropriate for hospice but did state he would like to die at home  Patient's wife Iva Llanes at bedside this morning  She is praying and hoping for the best, states multiple times "Saurabh Chelo is a fighter"  She reflects on the last few months being very difficult for Stephanie Martin, not only in increasing symptoms of his COPD, but he also unexpectedly lost his sister in December, whom he was very close to  She notes he was depressed and struggling with her loss greatly  She shared that Stephanie Martin enjoyed fishing and going out to dinner, but has been unable to do those things due to his COPD  Iva Llanes notes that getting Stephanie Martin out of the house has been very difficult due to his SOB and fatigue  Iva Llanes finds her support with their two children and Natalio's niece, Jared Young  She hopes that Coilaholden Young will be able to travel from [place] possibly this week to visit herself and Stephanie Martin in the hospital  Iva Llanes also found talking with  Shellie Vera helpful  She questions why "bad things happen to good people"  She reflected on both their anger over losing good people in their lives over the last few years  Iva Llanes noted that her and Samirarodney Martin never really talked about advanced care planning, though he was given a copy of the 5 wishes in January  Iva Llanes said Stephanie Martin didn't want to review it and wasn't ready for that   She knows that he did tell her that he does not like being in the hospital and that he would want to die at home  Hugo Kang shared that she has been having a hard time sleeping at night because she is used to hearing gamigos oxygen running, and finds the silence very difficult for her  MEDICATIONS / ALLERGIES:     all current active meds have been reviewed    No Known Allergies    OBJECTIVE:    Physical Exam  Physical Exam  Vitals and nursing note reviewed  Constitutional:       General: He is not in acute distress  Appearance: He is underweight  He is ill-appearing  Interventions: He is sedated and intubated  HENT:      Head: Normocephalic  Cardiovascular:      Rate and Rhythm: Normal rate  Pulmonary:      Effort: No respiratory distress  He is intubated  Comments: Intubated  Abdominal:      General: There is no distension  Musculoskeletal:      Cervical back: Neck supple  Skin:     Coloration: Skin is pale  Neurological:      Comments: sedated         Lab Results:   I have personally reviewed pertinent labs  , CBC:   Lab Results   Component Value Date    WBC 10 91 (H) 02/13/2023    HGB 8 1 (L) 02/13/2023    HCT 25 7 (L) 02/13/2023    MCV 99 (H) 02/13/2023     02/13/2023    MCH 31 0 02/13/2023    MCHC 31 5 02/13/2023    RDW 12 5 02/13/2023    MPV 10 4 02/13/2023    NRBC 0 02/13/2023   , CMP:   Lab Results   Component Value Date    SODIUM 129 (L) 02/13/2023    K 3 5 02/13/2023    CL 85 (L) 02/13/2023    CO2 38 (H) 02/13/2023    BUN 20 02/13/2023    CREATININE 0 51 (L) 02/13/2023    CALCIUM 8 2 (L) 02/13/2023    AST 44 02/13/2023    ALT 22 02/13/2023    ALKPHOS 53 02/13/2023    EGFR 112 02/13/2023     Imaging Studies:   CXR 2/12/23  IMPRESSION:     1   Persistent high positioning of the endotracheal tube  Advancement by at least 4 cm advised  2   Tip of right internal jugular central venous catheter projects over the lower SVC  No pneumothorax  3   Persistent pulmonary vascular congestion      14 [facility] 2/12/23  IMPRESSION:     No acute intracranial abnormality  EKG, Pathology, and Other Studies: n/a    Counseling / Coordination of Care    Total floor / unit time spent today 60 minutes  Greater than 50% of total time was spent with the patient and / or family counseling and / or coordination of care  A description of the counseling / coordination of care: psychosocial support, chart review, imaging review, lab review, goals of care, supportive listening and anticipatory guidance

## 2023-02-13 NOTE — RESPIRATORY THERAPY NOTE
RT Ventilator Management Note  Juan Jose Posey  74 y o  male MRN: [id number]  Unit/Bed#: [facility] Encounter: [id number]      Daily Screen         2/12/2023  0830 2/13/2023  0814          Patient safety screen outcome[de-identified] Failed Failed (P)       Not Ready for Weaning due to[de-identified] Underline problem not resolved Underline problem not resolved (P)                 Physical Exam:   Assessment Type: (P) Assess only  General Appearance: (P) Sedated  Respiratory Pattern: (P) Assisted  Chest Assessment: (P) Chest expansion symmetrical  Bilateral Breath Sounds: (P) Diminished  Cough: (P) Productive  Suction: (P) ET Tube      Resp Comments: (P) pt continues on sCMV no changes at this time will continue to monitor

## 2023-02-13 NOTE — PLAN OF CARE
Problem: SAFETY,RESTRAINT: NV/NON-SELF DESTRUCTIVE BEHAVIOR  Goal: Remains free of harm/injury (restraint for non violent/non self-detsructive behavior)  Description: INTERVENTIONS:  - Instruct patient/family regarding restraint use   - Assess and monitor physiologic and psychological status   - Provide interventions and comfort measures to meet assessed patient needs   - Identify and implement measures to help patient regain control  - Assess readiness for release of restraint   Outcome: Progressing  Goal: Returns to optimal restraint-free functioning  Description: INTERVENTIONS:  - Assess the patient's behavior and symptoms that indicate continued need for restraint  - Identify and implement measures to help patient regain control  - Assess readiness for release of restraint   Outcome: Progressing     Problem: MOBILITY - ADULT  Goal: Maintain or return to baseline ADL function  Description: INTERVENTIONS:  -  Assess patient's ability to carry out ADLs; assess patient's baseline for ADL function and identify physical deficits which impact ability to perform ADLs (bathing, care of mouth/teeth, toileting, grooming, dressing, etc )  - Assess/evaluate cause of self-care deficits   - Assess range of motion  - Assess patient's mobility; develop plan if impaired  - Assess patient's need for assistive devices and provide as appropriate  - Encourage maximum independence but intervene and supervise when necessary  - Involve family in performance of ADLs  - Assess for home care needs following discharge   - Consider OT consult to assist with ADL evaluation and planning for discharge  - Provide patient education as appropriate  Outcome: Progressing  Goal: Maintains/Returns to pre admission functional level  Description: INTERVENTIONS:  - Perform BMAT or MOVE assessment daily    - Set and communicate daily mobility goal to care team and patient/family/caregiver     - Collaborate with rehabilitation services on mobility goals if consulted  - Out of bed for toileting  - Record patient progress and toleration of activity level   Outcome: Progressing     Problem: PAIN - ADULT  Goal: Verbalizes/displays adequate comfort level or baseline comfort level  Description: Interventions:  - Encourage patient to monitor pain and request assistance  - Assess pain using appropriate pain scale  - Administer analgesics based on type and severity of pain and evaluate response  - Implement non-pharmacological measures as appropriate and evaluate response  - Consider cultural and social influences on pain and pain management  - Notify physician/advanced practitioner if interventions unsuccessful or patient reports new pain  Outcome: Progressing     Problem: INFECTION - ADULT  Goal: Absence or prevention of progression during hospitalization  Description: INTERVENTIONS:  - Assess and monitor for signs and symptoms of infection  - Monitor lab/diagnostic results  - Monitor all insertion sites, i e  indwelling lines, tubes, and drains  - Monitor endotracheal if appropriate and nasal secretions for changes in amount and color  - [place] appropriate cooling/warming therapies per order  - Administer medications as ordered  - Instruct and encourage patient and family to use good hand hygiene technique  - Identify and instruct in appropriate isolation precautions for identified infection/condition  Outcome: Progressing  Goal: Absence of fever/infection during neutropenic period  Description: INTERVENTIONS:  - Monitor WBC    Outcome: Progressing     Problem: SAFETY ADULT  Goal: Maintain or return to baseline ADL function  Description: INTERVENTIONS:  -  Assess patient's ability to carry out ADLs; assess patient's baseline for ADL function and identify physical deficits which impact ability to perform ADLs (bathing, care of mouth/teeth, toileting, grooming, dressing, etc )  - Assess/evaluate cause of self-care deficits   - Assess range of motion  - Assess patient's mobility; develop plan if impaired  - Assess patient's need for assistive devices and provide as appropriate  - Encourage maximum independence but intervene and supervise when necessary  - Involve family in performance of ADLs  - Assess for home care needs following discharge   - Consider OT consult to assist with ADL evaluation and planning for discharge  - Provide patient education as appropriate  Outcome: Progressing  Goal: Maintains/Returns to pre admission functional level  Description: INTERVENTIONS:  - Perform BMAT or MOVE assessment daily    - Set and communicate daily mobility goal to care team and patient/family/caregiver     - Collaborate with rehabilitation services on mobility goals if consulted  - Out of bed for toileting  - Record patient progress and toleration of activity level   Outcome: Progressing  Goal: Patient will remain free of falls  Description: INTERVENTIONS:  - Educate patient/family on patient safety including physical limitations  - Instruct patient to call for assistance with activity   - Consult OT/PT to assist with strengthening/mobility   - Keep Call bell within reach  - Keep bed low and locked with side rails adjusted as appropriate  - Keep care items and personal belongings within reach  - Initiate and maintain comfort rounds  - Make Fall Risk Sign visible to staff  - Apply yellow socks and bracelet for high fall risk patients  - Consider moving patient to room near nurses station  Outcome: Progressing     Problem: DISCHARGE PLANNING  Goal: Discharge to home or other facility with appropriate resources  Description: INTERVENTIONS:  - Identify barriers to discharge w/patient and caregiver  - Arrange for needed discharge resources and transportation as appropriate  - Identify discharge learning needs (meds, wound care, etc )  - Arrange for interpretive services to assist at discharge as needed  - Refer to Case Management Department for coordinating discharge planning if the patient needs post-hospital services based on physician/advanced practitioner order or complex needs related to functional status, cognitive ability, or social support system  Outcome: Progressing     Problem: Knowledge Deficit  Goal: Patient/family/caregiver demonstrates understanding of disease process, treatment plan, medications, and discharge instructions  Description: Complete learning assessment and assess knowledge base    Interventions:  - Provide teaching at level of understanding  - Provide teaching via preferred learning methods  Outcome: Progressing     Problem: Prexisting or High Potential for Compromised Skin Integrity  Goal: Skin integrity is maintained or improved  Description: INTERVENTIONS:  - Identify patients at risk for skin breakdown  - Assess and monitor skin integrity  - Assess and monitor nutrition and hydration status  - Monitor labs   - Assess for incontinence   - Turn and reposition patient  - Assist with mobility/ambulation  - Relieve pressure over bony prominences  - Avoid friction and shearing  - Provide appropriate hygiene as needed including keeping skin clean and dry  - Evaluate need for skin moisturizer/barrier cream  - Collaborate with interdisciplinary team   - Patient/family teaching  - Consider wound care consult   Outcome: Progressing

## 2023-02-13 NOTE — CASE MANAGEMENT
Case Management Assessment & Discharge Planning Note    Patient name Annel Steiner    Location MICU 10/MICU 10 MRN [id number]  : 1955 Date 2023       Current Admission Date: 2023  Current Admission Diagnosis:Chronic obstructive pulmonary disease [facility])   Patient Active Problem List    Diagnosis Date Noted   • Chronic HFrEF (heart failure with reduced ejection fraction) ([facility] ) 2022   • Mild protein-calorie malnutrition ([facility] ) 2022   • Pulmonary nodules/lesions, multiple 2022   • Former tobacco use 2022   • Prostate cancer ([place] [address] ) 2021   • BPH with obstruction/lower urinary tract symptoms 2021   • Chronic hypoxemic respiratory failure ([facility] ) 2020   • Macrocytosis without anemia 2019   • Other insomnia 2019   • Gastroesophageal reflux disease 2017   • Coronary artery disease 2016   • Mood disorder ([facility] ) 2015   • Impaired fasting glucose 2015   • Osteoporosis 10/14/2014   • Vitamin D deficiency 10/14/2014   • Cardiomyopathy, dilated ([facility] ) 2014   • Hypercholesterolemia    • Acute systolic congestive heart failure ([facility] ) 2013   • Left bundle-branch block 2013   • Osteoarthritis 2013   • KEVIN and COPD overlap syndrome ([facility] ) 2013   • Chronic obstructive pulmonary disease ([facility] ) 2012      LOS (days): 1  Geometric Mean LOS (GMLOS) (days):   Days to GMLOS:     OBJECTIVE:    Risk of Unplanned Readmission Score: 23 25         Current admission status: Inpatient       [facility]erred Pharmacy:   [id number]  [facility] [id number]  Phone: [phone] Fax: [phone]    Primary Care Provider: Pati Litten, DO    Primary Insurance: BLUE CROSS  Secondary Insurance: MEDICARE    ASSESSMENT:  [address] [facility], [address] [facility]   Primary Phone: [phone] (Mobile)  Home Phone: [phone]                         Readmission Root Cause  30 Day Readmission: No    Patient Information  Admitted from[de-identified] Home  Mental Status: Sedated, Intubated  During Assessment patient was accompanied by: Not accompanied during assessment  Assessment information provided by[de-identified] Spouse  Primary Caregiver: Self  Support Systems: Spouse/significant other  [place] of Residence: [address] [place] [address] do you live in?: [employer] entry access options   Select all that apply : Stairs  Number of steps to enter home : 3 (plus landing)  Type of Current Residence: [place]  In the last 12 months, was there a time when you did not have a steady place to sleep or slept in a shelter (including now)?: No  Homeless/housing insecurity resource given?: N/A  Living Arrangements: Lives w/ Spouse/significant other  Is patient a ?: No    Activities of Daily Living Prior to Admission  Functional Status: Independent  Completes ADLs independently?: Yes  Ambulates independently?: Yes  Does patient use assisted devices?: Yes  Assisted Devices (DME) used: Straight Cane, Home Oxygen concentrator, Portable Oxygen concentrator  DME Company Name (respiratory supplies): [employer]  O2 Rate(s): 3L  Does patient currently own DME?: Yes  What DME does the patient currently own?: Straight Cane, Home Oxygen concentrator, Portable Oxygen concentrator  Does patient have a history of Outpatient Therapy (PT/OT)?: No  Does the patient have a history of Short-Term Rehab?: No  Does patient have a history of HHC?: Yes ([facility] (brief))  Does patient currently have [address]?: No         Patient Information Continued  Income Source: Pension/USP  Does patient have prescription coverage?: Yes  Within the past 12 months, you worried that your food would run out before you got the money to buy more : Never true  Within the past 12 months, the food you bought just didn't last and you didn't have money to get more : Never true  Food insecurity resource given?: N/A  Does patient receive dialysis treatments?: No         Means of Transportation  In the past 12 months, has lack of transportation kept you from medical appointments or from getting medications?: No  In the past 12 months, has lack of transportation kept you from meetings, work, or from getting things needed for daily living?: No  Was application for public transport provided?: N/A        DISCHARGE DETAILS:    Discharge planning discussed with[de-identified] patient's wife Romeo Montenegro via phone  Freedom of Choice: Yes     CM contacted family/caregiver?: Yes  Were Treatment Team discharge recommendations reviewed with patient/caregiver?: Yes  Did patient/caregiver verbalize understanding of patient care needs?: Yes  Were patient/caregiver advised of the risks associated with not following Treatment Team discharge recommendations?: Yes    Contacts  Patient Contacts: Rodney Mackenzie, wife  Relationship to Patient[de-identified] Family  Contact Method: Phone  Phone Number: [phone]  Reason/Outcome: Continuity of Care, Emergency Contact, Discharge Planning                        Treatment Team Recommendation: Other (TBD)  Discharge Destination Plan[de-identified] Other (TBD -- awaiting recommendations)  Transport at Discharge : Other (Comment) (TBD)                Patient/caregiver received discharge checklist   Content reviewed  Patient/caregiver encouraged to participate in discharge plan of care prior to discharge home  CM reviewed d/c planning process including the following: identifying help at home, patient preference for d/c planning needs, Discharge Lounge, [facility] Meds to Bed program, availability of treatment team to discuss questions or concerns patient and/or family may have regarding understanding medications and recognizing signs and symptoms once discharged  CM also encouraged patient to follow up with all recommended appointments after discharge   Patient advised of importance for patient and family to participate in managing patient’s medical well being  Additional Comments: Introduced self and role to patient's wife Iva Llanes, via phone  Patient has been independent, but according to wife has "had a hard time walking around"  Has used a cane, wife stated she was looking into getting patient a wheelchair  Current with Steven, on 3LO2 continuously  CM will continue to follow for d/c needs

## 2023-02-13 NOTE — PROGRESS NOTES
Daily Progress Note - Critical Care   Lauren Villegas  74 y o  male MRN: [id number]  Unit/Bed#: MICU 10 Encounter: [id number]        ----------------------------------------------------------------------------------------  HPI/24hr events: HX and PE limited by: Respiratory distress, intubated, sedated  Lauren Villegas  is a 74 y o  male who presents with has a past medical history of end-stage COPD (FEV1 22%), heart failure with reduced ejection fraction (EF 40%), pulmonary nodules, protein-calorie malnutrition, hyperlipidemia, gastroesophageal reflux, impaired fasting glucose, osteoarthritis, osteoporosis, vitamin D deficiency, chronic hypoxemic and hypercapnic respiratory failure, benign prostate hyperplasia, prostate cancer, and former tobacco (105 PYH; quit 2012) abuse presents to [facility] due to shortness of breath       History obtained from ED documentation, chart review, and patient's spouse due to acute clinical situation  Per above the patient's symptoms have been starting over the past 1-2 days  He had been more drowsy than his baseline per wife  Falling asleep in his wheelchair  He was complaining of shortness of breath  The day prior to arrival he was hallucinating stating "something about a blue light" overnight he was in severe respiratory distress and was unable to speak  Wife became concerned and called 911  EMS arrival found the patient in severe respiratory distress and he was subsequently intubated in the field  Wife denies recent sick contacts, fever at home, no coughing, no sputum production  Wheezing at home  Short of breath       History obtained from spouse, chart review and unobtainable from patient due to respiratory failure and intubation/sedation     ---------------------------------------------------------------------------------------  SUBJECTIVE  Remains sedated   Overnight, patient able to follow simple commands while propofol was held but became increasingly agitated, requiring increased sedation  Review of Systems  Review of systems was unable to be performed secondary to intubated and sedated  ---------------------------------------------------------------------------------------  Assessment and Plan:  Plan:     Neuro:   · Sedated on propofol with fentanyl bolus  RASS goal 0 to -1  CPOT>2  · Minimally interacting on sedation  · Add precedex, and reassess mental status, plan for SBT tomorrow       CV:   · Hypotensive earlier  Likely following induction and loss of sympathetic drive  · Shock likely secondary to sedation vs distributive etiology in s/o possible PNA vs cardiogenic in setting of elevated BNP, elevated RSVP concerning for R heart failure likely 2/2 end stage COPD  · S/P one-time dose 40 mg IV lasix on 2/12  Monitor UOP   · Briefly on epinephrine, now weaned off, now on levo with propofol for sedation  · Hold further diuretics given he is on norepinephrine  · MAP goal >65  · Obtain VBG to further evaluate for cardiogenic versus septic shock   · Add vasopressin gtt for increased vascular tone  · Continue with ASCVD prophylaxis with ASA and statin       Pulm:  · Clinically appears to be in acute COPD exacerbation  · Continue aggressive bronchodilators-- Xopenx/Atrovent QID, perforomist/pulmicort BID  · Continue Solu-medrol 40 mg TID  · Continue ventilatory support  Repeat ABG showing chronic hypercapnia but improved pH  · Ventilator settings: 20/350/6/40, with goal sats 88%-92%  · Goals of care discussions with wife ongoing- wife states she is unsure of patient's vission re code status; previously, patient's outpatient pulmonologist discussed hospice care with patient     · Rate of care consulted, appreciate recommendations      GI:   · Known hx of GERD  · Continue stress ulcer prophylaxis in setting of concern for prolonged intubation  · As vasopressor requirements decrease will consider initiation of diet and cessation of stress ulcer prophylaxis        : · No active issues  · Continue tyler placement for accurate measurements of I&Os while attempting diuresis        F/E/N:   · Start tube feeding   · Monitor electrolytes; goal K>4, Phos>3, Mag>2  · Avoid additional IVF d/t  decompensated heart failrue       Heme/Onc:   · Pulmonary nodules noted  Serial monitoring  · Documented history of prostate cancer  · Continue DVT ppx        Endo:   · Monitor BG while on steroids  Can initiate insulin subq if needed  · Goal -180        ID:   · Meets SIRS criteria on admission, procalcitonin elevated  · Continue ceftriaxone and 3-day course of azithromycin  · Trend CBC and fever curve     MSK/Skin:   · Pressure ulcer ppx  · UOOB and early mobilization        Patient appropriate for transfer out of the ICU today?: No  Disposition: Admit to Critical Care   Code Status: Level 1 - Full Code  ---------------------------------------------------------------------------------------  ICU CORE MEASURES    Prophylaxis   VTE Pharmacologic Prophylaxis: Enoxaparin (Lovenox)  VTE Mechanical Prophylaxis: sequential compression device  Stress Ulcer Prophylaxis: Omeprazole PO    ABCDE Protocol (if indicated)  Plan to perform spontaneous awakening trial today? No  Plan to perform spontaneous breathing trial today? No  Obvious barriers to extubation?  Yes  CAM-ICU: Positive    Invasive Devices Review  Invasive Devices     Central Venous Catheter Line  Duration           CVC Central Lines 02/12/23 Triple 16cm <1 day          Peripheral Intravenous Line  Duration           Peripheral IV 02/12/23 Distal;Right;Ventral (anterior) Forearm 1 day    Peripheral IV 02/12/23 Left Antecubital 1 day    Peripheral IV 02/12/23 Left Forearm 1 day          Arterial Line  Duration           Arterial Line 02/12/23 Left Radial 1 day          Drain  Duration           NG/OG/Enteral Tube Orogastric 18 Fr Center mouth 1 day    Urethral Catheter <1 day          Airway  Duration           ETT  Oral 7 mm 1 day Can any invasive devices be discontinued today? No  ---------------------------------------------------------------------------------------  OBJECTIVE    Vitals   Vitals:    23 0500 23 0542 23 0600 23 0700   BP: 102/57  118/63 113/67   Pulse: 86  82 102   Resp:   (!) 23    Temp: 99 7 °F (37 6 °C)  99 3 °F (37 4 °C)    TempSrc:       SpO2: 94%  98%    Weight:  54 2 kg (119 lb 7 8 oz)     Height:         Temp (24hrs), Av 7 °F (37 6 °C), Min:97 2 °F (36 2 °C), Max:100 8 °F (38 2 °C)  Current: Temperature: 99 3 °F (37 4 °C)  HR: 86  BP: 113/67  RR: 23  SpO2: 98%    Respiratory:  SpO2 Device: O2 Device: Ventilator       Invasive/non-invasive ventilation settings   Respiratory    Lab Data (Last 4 hours)       0428            pH, Arterial       7 522             pCO2, Arterial       52 6             pO2, Arterial       79 8             HCO3, Arterial       42 2             Base Excess, Arterial       17 3                  O2/Vent Data     None                Physical Exam  Constitutional:       Appearance: He is ill-appearing  Comments: Intubated and sedated   HENT:      Head: Normocephalic and atraumatic  Mouth/Throat:      Mouth: Mucous membranes are moist    Cardiovascular:      Heart sounds: Normal heart sounds  Pulmonary:      Breath sounds: Normal breath sounds  No wheezing or rhonchi  Abdominal:      General: Bowel sounds are normal  There is no distension  Palpations: Abdomen is soft  Musculoskeletal:      Right lower leg: No edema  Left lower leg: No edema            Laboratory and Diagnostics:  Results from last 7 days   Lab Units 23  0429 23  0759 23  0529   WBC Thousand/uL 10 91*  --  7 24   HEMOGLOBIN g/dL 8 1*  --  8 9*   HEMATOCRIT % 25 7*  --  28 5*   PLATELETS Thousands/uL 208 220 204   NEUTROS PCT % 87*  --  73   MONOS PCT % 10  --  17*     Results from last 7 days   Lab Units 23  0429 23  0529   SODIUM mmol/L 129* 129*   POTASSIUM mmol/L 3 5 4 0   CHLORIDE mmol/L 85* 86*   CO2 mmol/L 38* 39*   ANION GAP mmol/L 6 4   BUN mg/dL 20 20   CREATININE mg/dL 0 51* 0 56*   CALCIUM mg/dL 8 2* 8 2*   GLUCOSE RANDOM mg/dL 139 114   ALT U/L 22 24   AST U/L 44 39   ALK PHOS U/L 53 64   ALBUMIN g/dL 2 9* 2 9*   TOTAL BILIRUBIN mg/dL 0 63 0 51     Results from last 7 days   Lab Units 02/13/23  0429   MAGNESIUM mg/dL 2 0   PHOSPHORUS mg/dL 3 1      Results from last 7 days   Lab Units 02/12/23  0529   INR  0 93   PTT seconds 27          Results from last 7 days   Lab Units 02/12/23  0529   LACTIC ACID mmol/L 1 2     ABG:  Results from last 7 days   Lab Units 02/13/23  0428   PH ART  7 522*   PCO2 ART mm Hg 52 6*   PO2 ART mm Hg 79 8   HCO3 ART mmol/L 42 2*   BASE EXC ART mmol/L 17 3   ABG SOURCE  Line, Arterial     VBG:  Results from last 7 days   Lab Units 02/13/23  0428 02/12/23  1937   PH JHONATHAN   --  7 454*   PCO2 JHONATHAN mm Hg  --  61 7*   PO2 JHONATHAN mm Hg  --  37 6   HCO3 JHONATHAN mmol/L  --  42 3*   BASE EXC JHONATHAN mmol/L  --  16 1   ABG SOURCE  Line, Arterial  --      Results from last 7 days   Lab Units 02/13/23  0429 02/12/23  0529   PROCALCITONIN ng/ml 0 87* 0 05       Micro  Results from last 7 days   Lab Units 02/12/23  0834 02/12/23  0803 02/12/23  0529   BLOOD CULTURE   --   --  Received in Microbiology Lab  Culture in Progress  Received in Microbiology Lab  Culture in Progress  GRAM STAIN RESULT  2+ Gram positive cocci in pairs*  2+ Gram negative rods*  Rare Polys*  --   --    LEGIONELLA URINARY ANTIGEN   --  Negative  --    STREP PNEUMONIAE ANTIGEN, URINE   --  Negative  --        EKG: NSR, PACs, PVCs rate 84  Imaging:   CT head wo contrast   Final Result by Gaetano Perez MD (02/12 1241)      No acute intracranial abnormality  Workstation performed: [id number]         XR chest 1 view portable   ED Interpretation by Albaro Reilly MD (02/12 0996)   Increased cephalization on LEFT > RIGHT    ITubated Final Result by Wanda Neves MD (02/12 0720)      Moderate pulmonary venous congestion  ET tube 6 cm above the ghada  Workstation performed: [id number]         XR chest portable    (Results Pending)      I have personally reviewed pertinent reports  Intake and Output  I/O       02/11 0701 02/12 0700 02/12 0701 02/13 0700 02/13 0701 02/14 0700    I V  (mL/kg)  727 6 (13 4)     IV Piggyback  475     Total Intake(mL/kg)  1202 6 (22 2)     Urine (mL/kg/hr)  2135 (1 6)     Emesis/NG output  50     Stool  0     Total Output  2185     Net  -982  4            Unmeasured Stool Occurrence  0 x         UOP: 2135 ml/hr     Height and Weights   Height: 5' (152 4 cm)  IBW (Ideal Body Weight): 50 kg  Body mass index is 23 34 kg/m²  Weight (last 2 days)     Date/Time Weight    02/13/23 0542 54 2 (119 49)    02/12/23 0900 61 6 (135 8)    02/12/23 0513 61 6 (135 8)            Nutrition       Diet Orders   (From admission, onward)             Start     Ordered    02/12/23 0629  Diet NPO  Diet effective now        References:    Nutrtion Support Algorithm Enteral vs  Parenteral   Question Answer Comment   Diet Type NPO    RD to adjust diet per protocol?  Yes        02/12/23 0631                  Active Medications  Scheduled Meds:  Current Facility-Administered Medications   Medication Dose Route Frequency Provider Last Rate   • aspirin  81 mg Oral Daily Mary Francisco, DO     • azithromycin  500 mg Oral Q24H Mary Francisco, DO     • budesonide  0 5 mg Nebulization Q12H Mary Singh, DO     • cefTRIAXone  1,000 mg Intravenous Q24H Kwadwo Rg MD 1,000 mg (02/13/23 0710)   • chlorhexidine  15 mL Mouth/Throat Q12H [address] Mary Francisco, DO     • enoxaparin  40 mg Subcutaneous Daily Mary Francisco, DO     • ergocalciferol  50,000 Units Oral Q28 Days Mary Francisco, DO     • fentanyl citrate (PF)  50 mcg Intravenous Q1H PRN Mary Francisco, DO     • fentanyl citrate (PF)  50 mcg Intravenous Once Ishan Tipton MD     • formoterol  20 mcg Nebulization Q12H Jessica Randolph, DO     • levalbuterol  1 25 mg Nebulization TID Ishan Tipton MD      And   • ipratropium  0 5 mg Nebulization TID Ishan Tipton MD     • methylPREDNISolone sodium succinate  40 mg Intravenous [facility] Jessica Alu, DO     • norepinephrine  1-30 mcg/min Intravenous Titrated Jessica Randolph DO 8 mcg/min (02/13/23 0135)   • omeprazole (PRILOSEC) suspension 2 mg/mL  20 mg Oral Daily Jessica Alu, DO     • pravastatin  80 mg Oral Daily With Mary Acuna, DO     • propofol  5-50 mcg/kg/min Intravenous Titrated Denis Farah MD 30 mcg/kg/min (02/13/23 8609)     Continuous Infusions:  norepinephrine, 1-30 mcg/min, Last Rate: 8 mcg/min (02/13/23 0135)  propofol, 5-50 mcg/kg/min, Last Rate: 30 mcg/kg/min (02/13/23 0712)      PRN Meds:   fentanyl citrate (PF), 50 mcg, Q1H PRN        Allergies   No Known Allergies  ---------------------------------------------------------------------------------------  Advance Directive and Living Will:      Power of :    POLST:    ---------------------------------------------------------------------------------------  Care Time Delivered:   Upon my evaluation, this patient had a high probability of imminent or life-threatening deterioration due to COPD exacerbation requirng mechanical ventillation, which required my direct attention, intervention, and personal management  I have personally provided 30 minute of critical care time, exclusive of procedures, teaching, family meetings, and any prior time recorded by providers other than myself  Nikolai Peters DO      Portions of the record may have been created with voice recognition software  Occasional wrong word or "sound a like" substitutions may have occurred due to the inherent limitations of voice recognition software    Read the chart carefully and recognize, using context, where substitutions have occurred

## 2023-02-13 NOTE — PLAN OF CARE
Problem: SAFETY,RESTRAINT: NV/NON-SELF DESTRUCTIVE BEHAVIOR  Goal: Remains free of harm/injury (restraint for non violent/non self-detsructive behavior)  Description: INTERVENTIONS:  - Instruct patient/family regarding restraint use   - Assess and monitor physiologic and psychological status   - Provide interventions and comfort measures to meet assessed patient needs   - Identify and implement measures to help patient regain control  - Assess readiness for release of restraint   Outcome: Progressing     Problem: MOBILITY - ADULT  Goal: Maintain or return to baseline ADL function  Description: INTERVENTIONS:  -  Assess patient's ability to carry out ADLs; assess patient's baseline for ADL function and identify physical deficits which impact ability to perform ADLs (bathing, care of mouth/teeth, toileting, grooming, dressing, etc )  - Assess/evaluate cause of self-care deficits   - Assess range of motion  - Assess patient's mobility; develop plan if impaired  - Assess patient's need for assistive devices and provide as appropriate  - Encourage maximum independence but intervene and supervise when necessary  - Involve family in performance of ADLs  - Assess for home care needs following discharge   - Consider OT consult to assist with ADL evaluation and planning for discharge  - Provide patient education as appropriate  Outcome: Progressing     Problem: PAIN - ADULT  Goal: Verbalizes/displays adequate comfort level or baseline comfort level  Description: Interventions:  - Encourage patient to monitor pain and request assistance  - Assess pain using appropriate pain scale  - Administer analgesics based on type and severity of pain and evaluate response  - Implement non-pharmacological measures as appropriate and evaluate response  - Consider cultural and social influences on pain and pain management  - Notify physician/advanced practitioner if interventions unsuccessful or patient reports new pain  Outcome: Progressing     Problem: INFECTION - ADULT  Goal: Absence or prevention of progression during hospitalization  Description: INTERVENTIONS:  - Assess and monitor for signs and symptoms of infection  - Monitor lab/diagnostic results  - Monitor all insertion sites, i e  indwelling lines, tubes, and drains  - Monitor endotracheal if appropriate and nasal secretions for changes in amount and color  - [place] appropriate cooling/warming therapies per order  - Administer medications as ordered  - Instruct and encourage patient and family to use good hand hygiene technique  - Identify and instruct in appropriate isolation precautions for identified infection/condition  Outcome: Progressing  Goal: Absence of fever/infection during neutropenic period  Description: INTERVENTIONS:  - Monitor WBC    Outcome: Progressing     Problem: SAFETY ADULT  Goal: Maintain or return to baseline ADL function  Description: INTERVENTIONS:  -  Assess patient's ability to carry out ADLs; assess patient's baseline for ADL function and identify physical deficits which impact ability to perform ADLs (bathing, care of mouth/teeth, toileting, grooming, dressing, etc )  - Assess/evaluate cause of self-care deficits   - Assess range of motion  - Assess patient's mobility; develop plan if impaired  - Assess patient's need for assistive devices and provide as appropriate  - Encourage maximum independence but intervene and supervise when necessary  - Involve family in performance of ADLs  - Assess for home care needs following discharge   - Consider OT consult to assist with ADL evaluation and planning for discharge  - Provide patient education as appropriate  Outcome: Progressing  Goal: Patient will remain free of falls  Description: INTERVENTIONS:  - Educate patient/family on patient safety including physical limitations  - Instruct patient to call for assistance with activity   - Consult OT/PT to assist with strengthening/mobility   - Keep Call bell within reach  - Keep bed low and locked with side rails adjusted as appropriate  - Keep care items and personal belongings within reach  - Initiate and maintain comfort rounds  - Make Fall Risk Sign visible to staff  - Initiate/Maintain bed alarm  - Apply yellow socks and bracelet for high fall risk patients  - Consider moving patient to room near nurses station  Outcome: Progressing     Problem: DISCHARGE PLANNING  Goal: Discharge to home or other facility with appropriate resources  Description: INTERVENTIONS:  - Identify barriers to discharge w/patient and caregiver  - Arrange for needed discharge resources and transportation as appropriate  - Identify discharge learning needs (meds, wound care, etc )  - Arrange for interpretive services to assist at discharge as needed  - Refer to Case Management Department for coordinating discharge planning if the patient needs post-hospital services based on physician/advanced practitioner order or complex needs related to functional status, cognitive ability, or social support system  Outcome: Progressing     Problem: Knowledge Deficit  Goal: Patient/family/caregiver demonstrates understanding of disease process, treatment plan, medications, and discharge instructions  Description: Complete learning assessment and assess knowledge base    Interventions:  - Provide teaching at level of understanding  - Provide teaching via preferred learning methods  Outcome: Progressing     Problem: Prexisting or High Potential for Compromised Skin Integrity  Goal: Skin integrity is maintained or improved  Description: INTERVENTIONS:  - Identify patients at risk for skin breakdown  - Assess and monitor skin integrity  - Assess and monitor nutrition and hydration status  - Monitor labs   - Assess for incontinence   - Turn and reposition patient  - Assist with mobility/ambulation  - Relieve pressure over bony prominences  - Avoid friction and shearing  - Provide appropriate hygiene as needed including keeping skin clean and dry  - Evaluate need for skin moisturizer/barrier cream  - Collaborate with interdisciplinary team   - Patient/family teaching  - Consider wound care consult   Outcome: Progressing

## 2023-02-13 NOTE — PROGRESS NOTES
Pastoral Care Progress Note    2023  Patient: Tori Machucaite  : 1955  Admission Date & Time: 2023 0507  MRN: [id number] [facility]: [id number]           23 1500   Clinical Encounter Type   Visited With Patient;Patient not available   Routine Visit Follow-up   Continue Visiting Yes   Referral From Physician   Referral To [address] reached out to me via TT to follow-up with the pt's wife after her 's admission and transfer to MICU  I entered the room and the pt was present but his wife was not  I offered silent prayer and supportive presence and then left  I will follow-up tomorrow and try to touch base with pt's wife, Radha Baer  Chaplains still remain available

## 2023-02-13 NOTE — UTILIZATION REVIEW
Initial Clinical Review    Admission: Date/Time/Statement:   Admission Orders (From admission, onward)     Ordered        02/12/23 0631  Inpatient Admission  Once                      Orders Placed This Encounter   Procedures   • Inpatient Admission     Standing Status:   Standing     Number of Occurrences:   1     Order Specific Question:   Level of Care     Answer:   Critical Care [15]     Order Specific Question:   Estimated length of stay     Answer:   More than 2 Midnights     Order Specific Question:   Certification     Answer:   I certify that inpatient services are medically necessary for this patient for a duration of greater than two midnights  See H&P and MD Progress Notes for additional information about the patient's course of treatment  ED Arrival Information     Expected   -    Arrival   2/12/2023 05:07    Acuity   Immediate            Means of arrival   Ambulance    Escorted by   [facility]    Service   Hospitalist    Admission type   Emergency            Arrival complaint   Respiratory Failure           Chief Complaint   Patient presents with   • Respiratory Distress     From home  Sick for a few days per wife, was hallucinating yesterday  Gasping upom EMS arrival, unable to speak  Intubated in field       Initial Presentation: 74 y o  male with PMH of COPD, HFrEF 40%, pulmonary nodules, HLD, GERD, osteoarthritis, chronic hypoxemic and hypercapnic respiratory failure, prostate ca presented to te ED form home via EMS w/ SOB x 1-2 days  Per wife, he had been drowst than baseline  Falling asllep in his wheelchair  C/o SOB  Having hallucinations yesterday and severe resp distress , unable to speak overnight  Wife called 911  EMS found pt in severe resp failure, intubated in the field  Received multiple medications in the field which included Solu-Medrol, magnesium, terbutaline and fluids  Received Epi bolus d/y hypotension w/ SBP into 70s    Pt arrived in the ED intubated w/ b/l breath sounds w/ wheezing noted  GCS 3, weak +1 radial pulses  Minimally interacting  Placed on Epi gtt  Echo showed decreased EF  Given Ketamine boluses for sedation  Admit as observation level of care for as Inpatient for Acute on chronic hypoxic and hypercapnic respiratory failure, End stage COPD in exacerbation, Acute encephalopathy, pulmonary edema:   Intubated, cont MV support- 20/350/40/6  Gwenette Carrier Sedated on propofol with fentanyl bolus  RASS goal 0 to -1  Wean sedatives and reassess mental status  Epi was weaned off, replaced w/ norepi  MAP goal>65  Cont ASA, statin  Rpt echo  Will give 1 dose IV Lasix  Mon UOP  Will start aggressive bronchodilators  Xopenx/Atrovent QID, perforomist/pulmicort BID  Solu-medrol 40 mg TID  PPI ppx  Palliative Care consult for [address]  NPO  Mon electrolytes  Cont Azithromycin  Date: 02/13   Day 2:   Critical Care Notes: Pt remains intubated, on MV, sedated  Overnight, he was able to follow commands while propofol was held but became increasingly agitated, requiring increased sedation  Add precedex, and reassess mental status, plan for SBT tomorrow  Hold further diuretics given he is on norepinephrine  Map goal >65  Obtain VBG  Cont ASCVD ppx  Xopenx/Atrovent QID, perforomist/pulmicort  Continue Solu-medrol  PPI ppx  Start TF  Mon electrolytes  Procal elevated, cont ceftriaxone, azithromycin  Trend fever curve and WBC       ED Triage Vitals   Temperature Pulse Respirations Blood Pressure SpO2   02/12/23 0613 02/12/23 0511 02/12/23 0525 02/12/23 0511 02/12/23 0510   (!) 95 °F (35 °C) 80 (!) 11 90/51 100 %      Temp Source Heart Rate Source Patient Position - Orthostatic VS BP Location FiO2 (%)   02/12/23 0613 02/12/23 0900 02/12/23 1100 02/12/23 1100 02/12/23 0510   Rectal Monitor Lying Right arm 50      Pain Score       02/12/23 0516       Med Not Given for Pain - for MAR use only          Wt Readings from Last 1 Encounters:   02/13/23 54 2 kg (119 lb 7 8 oz)     Additional Vital Signs: Date/Time Temp Pulse Resp BP MAP (mmHg) Arterial Line BP MAP SpO2 FiO2 (%) O2 Device Patient Position - Orthostatic VS   02/13/23 1400 99 3 °F (37 4 °C) 94 20 118/69 84 120/60 82 mmHg 99 % -- Ventilator Lying   02/13/23 1300 99 °F (37 2 °C) 90 20 114/72 87 126/60 84 mmHg 97 % -- -- Lying   02/13/23 1200 99 °F (37 2 °C) 86 20 104/63 77 120/52 72 mmHg 98 % -- Ventilator Lying   02/13/23 1100 98 6 °F (37 °C) 90 20 102/67 81 118/48 70 mmHg 97 % -- Ventilator Lying   02/13/23 1000 99 °F (37 2 °C) 88 20 111/64 79 124/50 72 mmHg 97 % -- Ventilator Lying   02/13/23 0900 99 °F (37 2 °C) 88 20 103/63 76 110/46 66 mmHg 98 % -- Ventilator Lying   02/13/23 0800 99 3 °F (37 4 °C) 80 20 103/61 75 110/46 64 mmHg Abnormal  98 % -- Ventilator Lying   02/13/23 0739 99 7 °F (37 6 °C) 102 -- -- -- 120/54 76 mmHg 96 % -- Ventilator --   02/13/23 0700 -- 102 -- 113/67 89 118/54 76 mmHg -- -- -- --   02/13/23 0600 99 3 °F (37 4 °C) 82 23 Abnormal  118/63 77 124/54 74 mmHg 98 % -- -- --   02/13/23 0500 99 7 °F (37 6 °C) 86 -- 102/57 74 124/56 76 mmHg 94 % -- -- --   02/13/23 0400 99 7 °F (37 6 °C) 90 22 115/70 85 120/48 70 mmHg 97 % -- -- --   02/13/23 0325 -- -- -- -- -- -- -- 97 % -- -- --   02/13/23 0300 99 7 °F (37 6 °C) 94 -- 114/71 86 122/52 76 mmHg 98 % -- -- --   02/13/23 0200 99 7 °F (37 6 °C) 104 -- 108/68 80 114/50 72 mmHg 97 % -- -- --   02/13/23 0100 100 °F (37 8 °C) 92 19 115/72 85 116/52 74 mmHg 97 % -- -- --   02/13/23 0011 -- -- -- -- -- -- -- 98 % -- -- --   02/13/23 0000 100 °F (37 8 °C) 90 19 115/69 86 122/52 74 mmHg 98 % -- -- --   02/12/23 2300 -- 92 -- 119/71 86 128/56 80 mmHg 98 % -- -- --   02/12/23 2200 99 7 °F (37 6 °C) 92 -- 110/66 78 126/54 76 mmHg 97 % -- -- --   02/12/23 2100 97 2 °F (36 2 °C) Abnormal  96 -- 117/71 85 128/56 78 mmHg 99 % -- -- --   02/12/23 2013 -- -- -- -- -- -- -- 98 % -- -- --   02/12/23 2000 99 7 °F (37 6 °C) 92 18 113/67 83 116/54 76 mmHg 98 % -- -- --   02/12/23 1900 99 3 °F (37 4 °C) 88 -- 117/68 90 122/56 78 mmHg 98 % -- -- --   02/12/23 1800 100 8 °F (38 2 °C) Abnormal   88  -- 112/70 85 116/54 74 mmHg 96 % -- Ventilator Lying   Temp: Simultaneous filing  User may not have seen previous data  at 02/12/23 1800   Pulse: Simultaneous filing  User may not have seen previous data   at 02/12/23 1800   02/12/23 1700 100 4 °F (38 °C) 92 -- 104/59 73 98/50 68 mmHg 96 % -- Ventilator Lying   02/12/23 1600 100 8 °F (38 2 °C) Abnormal  100 -- 116/73 91 108/52 70 mmHg 96 % -- Ventilator Lying   02/12/23 1500 100 4 °F (38 °C) 94 -- 110/67 82 104/50 68 mmHg 97 % -- Ventilator --   02/12/23 1407 100 °F (37 8 °C) 96 -- 113/66 76 106/48 68 mmHg 97 % -- -- --   02/12/23 1400 100 °F (37 8 °C) 94 -- 113/66 76 108/50 68 mmHg 98 % -- Ventilator --   02/12/23 1312 99 7 °F (37 6 °C) 98 18 102/60 78 110/52 72 mmHg 95 % -- -- --   02/12/23 1300 99 7 °F (37 6 °C) 88 18 102/60 78 114/52 72 mmHg 97 % -- Ventilator Lying   02/12/23 1200 99 7 °F (37 6 °C) 92 18 115/64 87 122/54 76 mmHg 97 % -- Ventilator Lying   02/12/23 1100 98 6 °F (37 °C) 92 -- 107/63 81 118/56 76 mmHg 98 % -- Ventilator Lying   02/12/23 1000 -- 94 -- 99/60 73 116/58 78 mmHg 97 % -- Ventilator --   02/12/23 0900 -- 88 -- 97/67 77 110/54 74 mmHg 95 % -- Ventilator --   02/12/23 0830 -- -- -- -- -- -- -- 91 % -- -- --   02/12/23 0800 -- 88 -- 109/58 76 124/54 76 mmHg 96 % -- -- --   02/12/23 0732 -- -- -- -- -- -- -- 100 % -- -- --   02/12/23 0700 -- 86 17 -- -- 118/48 68 mmHg 100 % -- -- --   02/12/23 0651 -- 82 17 112/49 Abnormal  69 -- -- 100 % -- -- --   02/12/23 0645 -- 88 17 110/42 Abnormal  64 Abnormal  -- -- 99 % -- -- --   02/12/23 0637 -- 82 17 120/49 Abnormal  70 -- -- 100 % -- Ventilator --   02/12/23 0623 -- 80 17 114/67 67 -- -- 100 % -- -- --   02/12/23 0613 95 °F (35 °C) Abnormal  -- -- -- -- -- -- -- -- -- --   02/12/23 0550 -- 82 18 120/46 Abnormal  68 -- -- 100 % -- -- --   02/12/23 0544 -- 80 17 136/94 109 -- -- 100 % -- -- --   02/12/23 0541 -- 82 18 131/74 96 -- -- -- -- Ventilator --   02/12/23 0538 -- 84 18 131/66 93 -- -- 100 % -- -- --   02/12/23 0535 -- 82 13 132/74 95 -- -- 100 % -- -- --   02/12/23 0532 -- 72 11 Abnormal  114/64 83 -- -- 100 % -- -- --   02/12/23 0529 -- 72 11 Abnormal  83/57 Abnormal  66 -- -- 100 % -- -- --   02/12/23 0526 -- 78 17 61/43 Abnormal  49 Abnormal  -- -- 100 % -- -- --   02/12/23 0525 -- 74 11 Abnormal  61/43 Abnormal  48 Abnormal  -- -- 100 % -- -- --   02/12/23 0520 -- 78 -- 70/44 Abnormal  51 Abnormal  -- -- 100 % -- -- --   02/12/23 0519 -- 74 -- 71/48 Abnormal  56 Abnormal  -- -- 100 % -- -- --   02/12/23 0515 -- 82 -- 75/51 Abnormal  58 Abnormal  -- -- 100 % -- -- --   02/12/23 0514 -- 80 -- 95/60 72 -- -- 100 % -- -- --   02/12/23 0513 -- 80 -- -- -- -- -- 100 % -- -- --   02/12/23 0512 -- 82 -- -- -- -- -- 91 % -- -- --   02/12/23 0511 -- 80 -- 90/51 64 Abnormal  -- -- 97 % -- -- --       Pertinent Labs/Diagnostic Test Results:   XR chest portable ICU   Final Result by Andi Hutton MD (02/13 4200)   Tubes and lines as above without pneumothorax  Recommend advancement of nasogastric tube  No acute cardiopulmonary disease  The study was marked in [facility] for immediate notification  Workstation performed: [id number]         XR chest portable   Final Result by Stan Leon MD (34/78 2653)      1  Persistent high positioning of the endotracheal tube  Advancement by at least 4 cm advised  2   Tip of right internal jugular central venous catheter projects over the lower SVC  No pneumothorax  3   Persistent pulmonary vascular congestion  The study was marked in [facility] for immediate notification  Workstation performed: [id number]         CT head wo contrast   Final Result by Sammie Mathis MD (02/12 1241)      No acute intracranial abnormality                    Workstation performed: [id number]         XR chest 1 view portable   [facility] Zah[facility] Agiuar MD (02/12 2115)   Increased cephalization on LEFT > RIGHT  ITubated       Final Result by Andressa Blackburn MD (02/12 0720)      Moderate pulmonary venous congestion  ET tube 6 cm above the ghada  Workstation performed: [id number]           12/12   EKG result:Sinus rhythm with Premature atrial complexes and Premature ventricular complexes or Fusion complexes  Non-specific intra-ventricular conduction block  Cannot rule out Anteroseptal infarct , age undetermined  T wave abnormality, consider inferior ischemia    ECHO:  Left Ventricle: Left ventricular cavity size is moderately dilated  Wall thickness is normal  The left ventricular ejection fraction is 20-25%  Systolic function is severely reduced  There is severe global hypokinesis with regional variation  Diastolic function is mildly abnormal, consistent with grade I (abnormal) relaxation  •  IVS: There is abnormal septal motion consistent with left bundle branch block  •  Right Ventricle: Right ventricular cavity size is mildly dilated  •  Left Atrium: The atrium is mildly dilated  •  Mitral Valve: There is annular calcification  There is mild regurgitation  •  Tricuspid Valve: There is mild regurgitation    •  Pulmonary Artery: The pulmonary artery systolic pressure is mildly increased          Results from last 7 days   Lab Units 02/12/23 0627   SARS-COV-2  Not Detected     Results from last 7 days   Lab Units 02/13/23 0429 02/12/23 0759 02/12/23  0529   WBC Thousand/uL 10 91*  --  7 24   HEMOGLOBIN g/dL 8 1*  --  8 9*   HEMATOCRIT % 25 7*  --  28 5*   PLATELETS Thousands/uL 208 220 204   NEUTROS ABS Thousands/µL 9 43*  --  5 27     Results from last 7 days   Lab Units 02/12/23  0759   RETIC CT ABS  57,500   RETIC CT PCT % 1 92*     Results from last 7 days   Lab Units 02/13/23 0429 02/12/23  0529   SODIUM mmol/L 129* 129*   POTASSIUM mmol/L 3 5 4 0   CHLORIDE mmol/L 85* 86*   CO2 mmol/L 38* 39*   ANION GAP mmol/L 6 4   BUN mg/dL 20 20   CREATININE mg/dL 0 51* 0 56*   EGFR ml/min/1 73sq m 112 108   CALCIUM mg/dL 8 2* 8 2*   CALCIUM, IONIZED mmol/L 1 02*  --    MAGNESIUM mg/dL 2 0  --    PHOSPHORUS mg/dL 3 1  --      Results from last 7 days   Lab Units 02/13/23  0429 02/12/23  0529   AST U/L 44 39   ALT U/L 22 24   ALK PHOS U/L 53 64   TOTAL PROTEIN g/dL 5 2* 5 3*   ALBUMIN g/dL 2 9* 2 9*   TOTAL BILIRUBIN mg/dL 0 63 0 51     Results from last 7 days   Lab Units 02/12/23  0529   POC GLUCOSE mg/dl 100     Results from last 7 days   Lab Units 02/13/23  0429 02/12/23  0529   GLUCOSE RANDOM mg/dL 139 114     Results from last 7 days   Lab Units 02/12/23  0533   OSMOLALITY, SERUM mmol/*         No results found for: BETA-HYDROXYBUTYRATE   Results from last 7 days   Lab Units 02/13/23  1304 02/13/23  0428 02/12/23  0818   PH ART  7 471* 7 522* 7 332*   PCO2 ART mm Hg 57 0* 52 6* 81 9*   PO2 ART mm Hg 100 0 79 8 70 0*   HCO3 ART mmol/L 40 6* 42 2* 42 4*   BASE EXC ART mmol/L 15 1 17 3 13 6   O2 CONTENT ART mL/dL 12 9* 12 6* 14 1*   O2 HGB, ARTERIAL % 97 0 95 4 91 3*   ABG SOURCE  Line, Arterial Line, Arterial Line, Arterial     Results from last 7 days   Lab Units 02/13/23  1204 02/12/23  1937 02/12/23  1809   PH JHONATHAN  7 458* 7 454* 7 459*   PCO2 JHONATHAN mm Hg 57 5* 61 7* 60 2*   PO2 JHONATHAN mm Hg 39 7 37 6 41 5   HCO3 JHONATHAN mmol/L 39 8* 42 3* 41 8*   BASE EXC JHONATHAN mmol/L 14 1 16 1 15 7   O2 CONTENT JHONATHAN ml/dL 9 6 10 0 10 8   O2 HGB, VENOUS % 72 1 72 4 78 2             Results from last 7 days   Lab Units 02/12/23  0944 02/12/23  0759 02/12/23  0533   HS TNI 0HR ng/L  --   --  42   HS TNI 2HR ng/L  --  52*  --    HSTNI D2 ng/L  --  10  --    HS TNI 4HR ng/L 53*  --   --    HSTNI D4 ng/L 11  --   --          Results from last 7 days   Lab Units 02/12/23  0529   PROTIME seconds 12 6   INR  0 93   PTT seconds 27         Results from last 7 days   Lab Units 02/13/23  0429 02/12/23  0529   PROCALCITONIN ng/ml 0 87* 0 05     Results from last 7 days   Lab Units 02/12/23  0529   LACTIC ACID mmol/L 1 2             Results from last 7 days   Lab Units 02/12/23  0529   NT-PRO BNP pg/mL 6,786*     Results from last 7 days   Lab Units 02/12/23  0529   FERRITIN ng/mL 209                     Results from last 7 days   Lab Units 02/12/23  0802 02/12/23  0533   OSMOLALITY, SERUM mmol/KG  --  276*   OSMO UR mmol/  --      Results from last 7 days   Lab Units 02/13/23  0859 02/12/23  0803   CLARITY UA  Extra Turbid  --    COLOR UA  Light Orange  --    SPEC GRAV UA  1 021  --    PH UA  6 0  --    GLUCOSE UA mg/dl Negative  --    KETONES UA mg/dl Negative  --    BLOOD UA  Moderate*  --    PROTEIN UA mg/dl 50 (1+)*  --    NITRITE UA  Negative  --    BILIRUBIN UA  Negative  --    UROBILINOGEN UA (BE) mg/dl <2 0  --    LEUKOCYTES UA  Large*  --    WBC UA /hpf 20-30*  --    RBC UA /hpf 4-10*  --    BACTERIA UA /hpf None Seen  --    EPITHELIAL CELLS WET PREP /hpf None Seen  --    SODIUM UR   --  <5   CREATININE UR mg/dL  --  114 0     Results from last 7 days   Lab Units 02/12/23  0803 02/12/23 0627   STREP PNEUMONIAE ANTIGEN, URINE  Negative  --    LEGIONELLA URINARY ANTIGEN  Negative  --    RESPIRATORY SYNCYTIAL VIRUS   --  Not Detected     Results from last 7 days   Lab Units 02/12/23 0627   ADENOVIRUS  Not Detected   BORDETELLA PARAPERTUSSIS  Not Detected   BORDETELLA PERTUSSIS  Not Detected   CHLAMYDIA PNEUMONIAE  Not Detected   CORONAVIRUS 229E  Not Detected   CORONAVIRUS HKU1  Not Detected   CORONAVIRUS NL63  Not Detected   CORONAVIRUS OC43  Not Detected   METAPNEUMOVIRUS  Not Detected   RHINOVIRUS  Not Detected   MYCOPLASMA PNEUMONIAE  Not Detected   PARAINFLUENZA 1  Not Detected   PARAINFLUENZA 2  Not Detected   PARAINFLUENZA 3  Not Detected   PARAINFLUENZA 4  Not Detected                         Results from last 7 days   Lab Units 02/12/23  0834 02/12/23  0529   BLOOD CULTURE   --  No Growth at 24 hrs  No Growth at 24 hrs     SPUTUM CULTURE Culture too young- will reincubate  --    GRAM STAIN RESULT  2+ Gram positive cocci in pairs*  2+ Gram negative rods*  Rare Polys*  --      Results from last 7 days   Lab Units 02/12/23  0759   TOTAL COUNTED  100           ED Treatment:   Medication Administration from 02/12/2023 0506 to 02/12/2023 7868       Date/Time Order Dose Route Action     02/12/2023 0524 EST fentanyl citrate (PF) (FOR EMS ONLY) 100 mcg/2 mL injection 100 mcg 0 mcg Does not apply Given to EMS     02/12/2023 0523 EST ketamine (FOR EMS ONLY) (KETALAR) 100 mg/mL 500 mg 0 mg Does not apply Given to EMS     02/12/2023 0522 EST magnesium sulfate (FOR EMS ONLY) 2 g/50 mL IVPB (premix) 2 g 0 g Does not apply Given to EMS     02/12/2023 0522 EST methylPREDNISolone sodium succinate (FOR EMS ONLY) (Solu-MEDROL) 125 MG injection 125 mg 0 mg Does not apply Given to EMS     02/12/2023 0524 EST midazolam (FOR EMS ONLY) (VERSED) 2 mg/2 mL injection 2 mg 0 mg Does not apply Given to EMS     02/12/2023 0522 EST terbutaline (FOR EMS ONLY) (BRETHINE) injection 1 mg 0 mg Does not apply Given to EMS     02/12/2023 0522 EST EPINEPHrine (FOR EMS ONLY) (ADRENALIN) injection 1 mg 0 mg Does not apply Given to EMS     02/12/2023 0600 EST albuterol inhalation solution 10 mg 10 mg Nebulization Given by Other     02/12/2023 0382 EST ipratropium (ATROVENT) 0 02 % inhalation solution 1 mg 1 mg Nebulization Given by Other     02/12/2023 0600 EST sodium chloride 0 9 % inhalation solution 3 mL 3 mL Nebulization Given by Other     02/12/2023 0700 EST cefTRIAXone (ROCEPHIN) 2,000 mg in dextrose 5 % 50 mL IVPB 0 mg Intravenous Stopped     02/12/2023 0640 EST cefTRIAXone (ROCEPHIN) 2,000 mg in dextrose 5 % 50 mL IVPB 2,000 mg Intravenous New Bag     02/12/2023 0516 EST fentanyl citrate (PF) 100 MCG/2ML 100 mcg 100 mcg Intravenous Given by Other     02/12/2023 0516 EST propofol (DIPRIVAN) 1000 mg in 100 mL infusion (premix) 20 mcg/kg/min Intravenous New Bag     02/12/2023 0515 EST ketamine (KETALAR) 100 mg 100 mg Intravenous Given by Other        Past Medical History:   Diagnosis Date   • Acute metabolic encephalopathy 2/12/2892   • Arthritis    • Bladder mass    • Cardiomyopathy [facility])    • Chest pain    • COPD (chronic obstructive pulmonary disease) ([facility])    • CPAP (continuous positive airway pressure) dependence    • Emphysema of lung (Formerly [facility])    • Hypoxia     nocturnal   • Left bundle branch block    • Multiple pulmonary nodules     last assessed: 10/12/16   • Pneumonia    • Sleep apnea    • Sleep apnea, obstructive    • Smoker    • Weight loss 8/29/2019     Present on Admission:  • Acute systolic congestive heart failure ([facility])  • Chronic HFrEF (heart failure with reduced ejection fraction) (Formerly [facility])  • Cardiomyopathy, dilated ([facility]ly [facility])  • Coronary artery disease  • Chronic obstructive pulmonary disease (Formerly [facility])  • KEVIN and COPD overlap syndrome ([facility]ly [facility])  • Left bundle-branch block  • Hypercholesterolemia  • Gastroesophageal reflux disease  • Chronic hypoxemic respiratory failure (Formerly [facility])      Admitting Diagnosis: Acute respiratory failure ([facility]ly [facility]) [J96 00]  COPD (chronic obstructive pulmonary disease) (Formerly [facility]) [J44 9]  Respiratory failure ([facility]ly [facility]) [J96 90]  CHF (congestive heart failure) (Formerly [facility]) [I50 9]  Acute on chronic respiratory failure with hypoxia and hypercapnia (Formerly [facility]) [J96 21, J96 22]  Age/Sex: 74 y o  male  Admission Orders:  SCDS  Cardio-Pulmonary Monitoring, Neuro Checks, Nursing dysphagia assesment, I/O, Daily weights, Vital signs  Restraints non violent    Scheduled Medications:  aspirin, 81 mg, Oral, Daily  azithromycin, 500 mg, Oral, Q24H  budesonide, 0 5 mg, Nebulization, Q12H  cefTRIAXone, 1,000 mg, Intravenous, Q24H  chlorhexidine, 15 mL, Mouth/Throat, Q12H GABE  enoxaparin, 40 mg, Subcutaneous, Daily  ergocalciferol, 50,000 Units, Oral, Q28 Days  fentanyl citrate (PF), 50 mcg, Intravenous, Once  formoterol, 20 mcg, Nebulization, Q12H  levalbuterol, 1 25 mg, Nebulization, TID   And  ipratropium, 0 5 mg, Nebulization, TID  methylPREDNISolone sodium succinate, 40 mg, Intravenous, Q8H GABE  omeprazole (PRILOSEC) suspension 2 mg/mL, 20 mg, Oral, Daily  pravastatin, 80 mg, Oral, Daily With Dinner      Continuous IV Infusions:  dexmedetomidine, 0 1-0 7 mcg/kg/hr, Intravenous, Titrated  norepinephrine, 1-30 mcg/min, Intravenous, Titrated  propofol, 5-50 mcg/kg/min, Intravenous, Titrated  vasopressin, 0 04 Units/min, Intravenous, Continuous  EPINEPHrine 3000 mcg (STANDARD CONCENTRATION) IV in sodium chloride 0 9% 250 mL  Rate: 5-50 mL/hr Dose: 1-10 mcg/min  Freq: Titrated Route: IV  Last Dose: Stopped (02/12/23 0815)  Start: 02/12/23 0630 End: 02/12/23 0912    PRN Meds:  fentanyl citrate (PF), 50 mcg, Intravenous, Q1H PRN 02/12 x 1, 02/13 x 2        IP CONSULT TO CASE MANAGEMENT  IP CONSULT TO PALLIATIVE CARE  IP CONSULT TO NUTRITION SERVICES    Network Utilization Review Department  ATTENTION: Please call with any questions or concerns to [phone] and carefully listen to the prompts so that you are directed to the right person  All voicemails are confidential   German Kilpatrick all requests for admission clinical reviews, approved or denied determinations and any other requests to dedicated fax number below belonging to the campus where the patient is receiving treatment   List of dedicated fax numbers for the Facilities:  [address] DENIALS (Administrative/Medical Necessity) [phone]   [address] [place] [address] (Maternity/NICU/Pediatrics) [phone]   [address] [phone]   [facility] [phone]   [facility] [phone]   [address] [address] [address] [place] [place] [id number] [place] [address] [address] [facility] [phone]   [address] [phone]

## 2023-02-13 NOTE — CONSULTS
Nutrition Recommendations:    RD has protocol so updated TF order with recommendations:   Vital 1 2 AF with goal rate of 50mL/hr x22 hrs (hold TF for 1 hour before and 1 hour after omeprazole administration to avoid drug-nutrient interaction)  Start at 10mL/hr and advance by 10mL/hr q4 hrs as tolerated to goal    At goal, provides 1320 kcals (1563 total kcals with current propofol), 82g protein, 892mL free water     Continue 100mL free water flushes 6 hrs per provider (in addition to TF, would provide 1292mL total free water)

## 2023-02-14 LAB
ALBUMIN SERPL BCP-MCNC: 3.2 G/DL (ref 3.5–5)
ALP SERPL-CCNC: 44 U/L (ref 46–116)
ALT SERPL W P-5'-P-CCNC: 23 U/L (ref 12–78)
ANION GAP SERPL CALCULATED.3IONS-SCNC: 6 MMOL/L (ref 4–13)
AST SERPL W P-5'-P-CCNC: 38 U/L (ref 5–45)
ATRIAL RATE: 71 BPM
BACTERIA SPT RESP CULT: ABNORMAL
BACTERIA UR CULT: NORMAL
BASOPHILS # BLD AUTO: 0 THOUSANDS/ÂΜL (ref 0–0.1)
BASOPHILS NFR BLD AUTO: 0 % (ref 0–1)
BILIRUB SERPL-MCNC: 0.53 MG/DL (ref 0.2–1)
BUN SERPL-MCNC: 31 MG/DL (ref 5–25)
CA-I BLD-SCNC: 1.06 MMOL/L (ref 1.12–1.32)
CALCIUM ALBUM COR SERPL-MCNC: 8.8 MG/DL (ref 8.3–10.1)
CALCIUM SERPL-MCNC: 8.2 MG/DL (ref 8.3–10.1)
CHLORIDE SERPL-SCNC: 88 MMOL/L (ref 96–108)
CO2 SERPL-SCNC: 38 MMOL/L (ref 21–32)
CREAT SERPL-MCNC: 0.51 MG/DL (ref 0.6–1.3)
EOSINOPHIL # BLD AUTO: 0 THOUSAND/ÂΜL (ref 0–0.61)
EOSINOPHIL NFR BLD AUTO: 0 % (ref 0–6)
ERYTHROCYTE [DISTWIDTH] IN BLOOD BY AUTOMATED COUNT: 12.7 % (ref 11.6–15.1)
EST. AVERAGE GLUCOSE BLD GHB EST-MCNC: 108 MG/DL
FERRITIN SERPL-MCNC: 201 NG/ML (ref 8–388)
GFR SERPL CREATININE-BSD FRML MDRD: 112 ML/MIN/1.73SQ M
GLUCOSE SERPL-MCNC: 191 MG/DL (ref 65–140)
GLUCOSE SERPL-MCNC: 204 MG/DL (ref 65–140)
GLUCOSE SERPL-MCNC: 263 MG/DL (ref 65–140)
GRAM STN SPEC: ABNORMAL
HAPTOGLOB SERPL-MCNC: 106 MG/DL (ref 32–363)
HBA1C MFR BLD: 5.4 %
HCT VFR BLD AUTO: 23 % (ref 36.5–49.3)
HGB BLD-MCNC: 7.4 G/DL (ref 12–17)
IMM GRANULOCYTES # BLD AUTO: 0.03 THOUSAND/UL (ref 0–0.2)
IMM GRANULOCYTES NFR BLD AUTO: 0 % (ref 0–2)
IRON SATN MFR SERPL: 14 % (ref 20–50)
IRON SERPL-MCNC: 35 UG/DL (ref 65–175)
LYMPHOCYTES # BLD AUTO: 0.19 THOUSANDS/ÂΜL (ref 0.6–4.47)
LYMPHOCYTES NFR BLD AUTO: 3 % (ref 14–44)
MAGNESIUM SERPL-MCNC: 2.2 MG/DL (ref 1.6–2.6)
MCH RBC QN AUTO: 31.6 PG (ref 26.8–34.3)
MCHC RBC AUTO-ENTMCNC: 32.2 G/DL (ref 31.4–37.4)
MCV RBC AUTO: 98 FL (ref 82–98)
MONOCYTES # BLD AUTO: 0.41 THOUSAND/ÂΜL (ref 0.17–1.22)
MONOCYTES NFR BLD AUTO: 6 % (ref 4–12)
NEUTROPHILS # BLD AUTO: 6.88 THOUSANDS/ÂΜL (ref 1.85–7.62)
NEUTS SEG NFR BLD AUTO: 91 % (ref 43–75)
NRBC BLD AUTO-RTO: 0 /100 WBCS
P AXIS: 87 DEGREES
PHOSPHATE SERPL-MCNC: 3.7 MG/DL (ref 2.3–4.1)
PLATELET # BLD AUTO: 179 THOUSANDS/UL (ref 149–390)
PMV BLD AUTO: 10 FL (ref 8.9–12.7)
POTASSIUM SERPL-SCNC: 3.7 MMOL/L (ref 3.5–5.3)
PR INTERVAL: 150 MS
PROT SERPL-MCNC: 5.3 G/DL (ref 6.4–8.4)
QRS AXIS: 84 DEGREES
QRSD INTERVAL: 133 MS
QT INTERVAL: 400 MS
QTC INTERVAL: 435 MS
RBC # BLD AUTO: 2.34 MILLION/UL (ref 3.88–5.62)
SODIUM SERPL-SCNC: 132 MMOL/L (ref 135–147)
T WAVE AXIS: 93 DEGREES
TIBC SERPL-MCNC: 242 UG/DL (ref 250–450)
VENTRICULAR RATE: 71 BPM
WBC # BLD AUTO: 7.51 THOUSAND/UL (ref 4.31–10.16)

## 2023-02-14 RX ORDER — FUROSEMIDE 10 MG/ML
20 INJECTION INTRAMUSCULAR; INTRAVENOUS ONCE
Status: COMPLETED | OUTPATIENT
Start: 2023-02-14 | End: 2023-02-14

## 2023-02-14 RX ORDER — CALCIUM GLUCONATE 20 MG/ML
2 INJECTION, SOLUTION INTRAVENOUS ONCE
Status: COMPLETED | OUTPATIENT
Start: 2023-02-14 | End: 2023-02-14

## 2023-02-14 RX ORDER — POLYETHYLENE GLYCOL 3350 17 G/17G
17 POWDER, FOR SOLUTION ORAL DAILY PRN
Status: DISCONTINUED | OUTPATIENT
Start: 2023-02-14 | End: 2023-02-28 | Stop reason: HOSPADM

## 2023-02-14 RX ORDER — INSULIN LISPRO 100 [IU]/ML
1-5 INJECTION, SOLUTION INTRAVENOUS; SUBCUTANEOUS EVERY 6 HOURS SCHEDULED
Status: DISCONTINUED | OUTPATIENT
Start: 2023-02-14 | End: 2023-02-15

## 2023-02-14 RX ORDER — AMOXICILLIN 250 MG
1 CAPSULE ORAL
Status: DISCONTINUED | OUTPATIENT
Start: 2023-02-14 | End: 2023-02-28 | Stop reason: HOSPADM

## 2023-02-14 RX ADMIN — NOREPINEPHRINE BITARTRATE 3 MCG/MIN: 1 INJECTION, SOLUTION, CONCENTRATE INTRAVENOUS at 12:38

## 2023-02-14 RX ADMIN — INSULIN LISPRO 1 UNITS: 100 INJECTION, SOLUTION INTRAVENOUS; SUBCUTANEOUS at 13:16

## 2023-02-14 RX ADMIN — DEXMEDETOMIDINE HYDROCHLORIDE 0.5 MCG/KG/HR: 4 INJECTION, SOLUTION INTRAVENOUS at 00:18

## 2023-02-14 RX ADMIN — METHYLPREDNISOLONE SODIUM SUCCINATE 40 MG: 40 INJECTION, POWDER, FOR SOLUTION INTRAMUSCULAR; INTRAVENOUS at 21:26

## 2023-02-14 RX ADMIN — CHLORHEXIDINE GLUCONATE 0.12% ORAL RINSE 15 ML: 1.2 LIQUID ORAL at 08:49

## 2023-02-14 RX ADMIN — DEXMEDETOMIDINE HYDROCHLORIDE 0.5 MCG/KG/HR: 4 INJECTION, SOLUTION INTRAVENOUS at 13:44

## 2023-02-14 RX ADMIN — AZITHROMYCIN MONOHYDRATE 500 MG: 250 TABLET ORAL at 08:08

## 2023-02-14 RX ADMIN — FUROSEMIDE 20 MG: 10 INJECTION, SOLUTION INTRAMUSCULAR; INTRAVENOUS at 12:36

## 2023-02-14 RX ADMIN — LEVALBUTEROL HYDROCHLORIDE 1.25 MG: 1.25 SOLUTION, CONCENTRATE RESPIRATORY (INHALATION) at 07:47

## 2023-02-14 RX ADMIN — FENTANYL CITRATE 50 MCG: 50 INJECTION INTRAMUSCULAR; INTRAVENOUS at 03:21

## 2023-02-14 RX ADMIN — VASOPRESSIN 0.04 UNITS/MIN: 20 INJECTION INTRAVENOUS at 19:11

## 2023-02-14 RX ADMIN — METHYLPREDNISOLONE SODIUM SUCCINATE 40 MG: 40 INJECTION, POWDER, FOR SOLUTION INTRAMUSCULAR; INTRAVENOUS at 05:07

## 2023-02-14 RX ADMIN — IPRATROPIUM BROMIDE 0.5 MG: 0.5 SOLUTION RESPIRATORY (INHALATION) at 07:46

## 2023-02-14 RX ADMIN — BUDESONIDE 0.5 MG: 0.5 INHALANT ORAL at 20:11

## 2023-02-14 RX ADMIN — BUDESONIDE 0.5 MG: 0.5 INHALANT ORAL at 07:47

## 2023-02-14 RX ADMIN — PROPOFOL 25 MCG/KG/MIN: 10 INJECTION, EMULSION INTRAVENOUS at 03:21

## 2023-02-14 RX ADMIN — INSULIN LISPRO 2 UNITS: 100 INJECTION, SOLUTION INTRAVENOUS; SUBCUTANEOUS at 19:09

## 2023-02-14 RX ADMIN — FORMOTEROL FUMARATE DIHYDRATE 20 MCG: 20 SOLUTION RESPIRATORY (INHALATION) at 07:47

## 2023-02-14 RX ADMIN — LEVALBUTEROL HYDROCHLORIDE 1.25 MG: 1.25 SOLUTION, CONCENTRATE RESPIRATORY (INHALATION) at 13:12

## 2023-02-14 RX ADMIN — FORMOTEROL FUMARATE DIHYDRATE 20 MCG: 20 SOLUTION RESPIRATORY (INHALATION) at 20:11

## 2023-02-14 RX ADMIN — VASOPRESSIN 0.04 UNITS/MIN: 20 INJECTION INTRAVENOUS at 04:20

## 2023-02-14 RX ADMIN — LEVALBUTEROL HYDROCHLORIDE 1.25 MG: 1.25 SOLUTION, CONCENTRATE RESPIRATORY (INHALATION) at 20:11

## 2023-02-14 RX ADMIN — FENTANYL CITRATE 50 MCG: 50 INJECTION INTRAMUSCULAR; INTRAVENOUS at 05:12

## 2023-02-14 RX ADMIN — PRAVASTATIN SODIUM 80 MG: 20 TABLET ORAL at 16:48

## 2023-02-14 RX ADMIN — Medication 20 MG: at 08:49

## 2023-02-14 RX ADMIN — SENNOSIDES AND DOCUSATE SODIUM 1 TABLET: 8.6; 5 TABLET ORAL at 21:26

## 2023-02-14 RX ADMIN — ASPIRIN 81 MG CHEWABLE TABLET 81 MG: 81 TABLET CHEWABLE at 08:49

## 2023-02-14 RX ADMIN — IPRATROPIUM BROMIDE 0.5 MG: 0.5 SOLUTION RESPIRATORY (INHALATION) at 13:12

## 2023-02-14 RX ADMIN — CALCIUM GLUCONATE 2 G: 20 INJECTION, SOLUTION INTRAVENOUS at 12:37

## 2023-02-14 RX ADMIN — IPRATROPIUM BROMIDE 0.5 MG: 0.5 SOLUTION RESPIRATORY (INHALATION) at 20:11

## 2023-02-14 RX ADMIN — ENOXAPARIN SODIUM 40 MG: 40 INJECTION SUBCUTANEOUS at 08:49

## 2023-02-14 RX ADMIN — CHLORHEXIDINE GLUCONATE 0.12% ORAL RINSE 15 ML: 1.2 LIQUID ORAL at 21:26

## 2023-02-14 RX ADMIN — CEFTRIAXONE 1000 MG: 1 INJECTION, POWDER, FOR SOLUTION INTRAMUSCULAR; INTRAVENOUS at 07:17

## 2023-02-14 RX ADMIN — METHYLPREDNISOLONE SODIUM SUCCINATE 40 MG: 40 INJECTION, POWDER, FOR SOLUTION INTRAMUSCULAR; INTRAVENOUS at 13:43

## 2023-02-14 NOTE — PROGRESS NOTES
Progress note - Palliative and Supportive Care   Елена Flores  74 y o  male [id number]    Patient Active Problem List   Diagnosis   • Coronary artery disease   • Cardiomyopathy, dilated (HCC)   • Chronic obstructive pulmonary disease (HCC)   • Acute systolic congestive heart failure (HCC)   • Left bundle-branch block   • KEVIN and COPD overlap syndrome (HCC)   • Hypercholesterolemia   • Gastroesophageal reflux disease   • Impaired fasting glucose   • Osteoarthritis   • Osteoporosis   • Vitamin D deficiency   • Mood disorder (HCC)   • Other insomnia   • Macrocytosis without anemia   • Chronic hypoxemic respiratory failure (HCC)   • BPH with obstruction/lower urinary tract symptoms   • Prostate cancer ([facility] )   • Pulmonary nodules/lesions, multiple   • Former tobacco use   • Mild protein-calorie malnutrition (HCC)   • Chronic HFrEF (heart failure with reduced ejection fraction) ([facility] )     Active issues specifically addressed today include:   -acute on chronic hypoxemic respiratory failure, intubated [02/12/2023]  -end-stage COPD  -HFrEF [EF 40%, TTE 08/26/2022]  -goals of care support  -palliative care encounter    Plan:  1  Symptom management   -per primary team    2  Goals    Code Status: FULL - Level 1   Decisional apparatus:  Patient is not competent on my exam today  If competence is lost, patient's substitute decision maker would default to spouse [place]  Advance Directive / Living Will / POLST:  None  3  Psychosocial   • Supportive listening provided  • Normalized experience of patient/family  • Provided anxiety containment  • Patient supported by spouse, Danie Clifford ( 15 yrs), two adopted children (son-lives [facility], daughter-lives in [facility]), ninichole Warner who lives in [place]     Interval history:  No acute events overnight  Patient remains intubated and sedated  MICU team added added additional sedation (precedex) and pressors (vasopressin) to regimen yesterday   Patient more awake and following commands  Per MICU team, attempt to extubate planned for today  Roya Bustillo at bedside, she is hopeful he gets extubated today and is hoping for the best      MEDICATIONS / ALLERGIES:     all current active meds have been reviewed    No Known Allergies    OBJECTIVE:    Physical Exam  Physical Exam  Vitals and nursing note reviewed  Constitutional:       General: He is not in acute distress  Appearance: He is cachectic  He is ill-appearing  Interventions: He is sedated and intubated  Cardiovascular:      Rate and Rhythm: Normal rate  Pulmonary:      Effort: No respiratory distress  He is intubated  Comments: Intubated and sedated  Abdominal:      General: There is no distension  Musculoskeletal:      Cervical back: Neck supple  Skin:     Coloration: Skin is pale  Neurological:      Comments: Sedated, intermittently opening eyes         Lab Results:   I have personally reviewed pertinent labs  , CBC:   Lab Results   Component Value Date    WBC 7 51 02/14/2023    HGB 7 4 (L) 02/14/2023    HCT 23 0 (L) 02/14/2023    MCV 98 02/14/2023     02/14/2023    MCH 31 6 02/14/2023    MCHC 32 2 02/14/2023    RDW 12 7 02/14/2023    MPV 10 0 02/14/2023    NRBC 0 02/14/2023   , CMP:   Lab Results   Component Value Date    SODIUM 132 (L) 02/14/2023    K 3 7 02/14/2023    CL 88 (L) 02/14/2023    CO2 38 (H) 02/14/2023    BUN 31 (H) 02/14/2023    CREATININE 0 51 (L) 02/14/2023    CALCIUM 8 2 (L) 02/14/2023    AST 38 02/14/2023    ALT 23 02/14/2023    ALKPHOS 44 (L) 02/14/2023    EGFR 112 02/14/2023     Imaging Studies:   CXR 2/13/23  IMPRESSION:  Tubes and lines as above without pneumothorax  Recommend advancement of nasogastric tube  No acute cardiopulmonary disease      The study was marked in EPIC for immediate notification  EKG, Pathology, and Other Studies: N/A     Counseling / Coordination of Care    Total floor / unit time spent today 45 minutes   Greater than 50% of total time was spent with the patient and / or family counseling and / or coordination of care  A description of the counseling / coordination of care: psychosocial support, chart review, imaging review, lab review, goals of care, supportive listening and anticipatory guidance

## 2023-02-14 NOTE — RESPIRATORY THERAPY NOTE
RT Ventilator Management Note  Smooth Fernandez  74 y o  male MRN: [id number]  Unit/Bed#: [facility] Encounter: [id number]      Daily Screen         2/12/2023 0830 2/13/2023  0814          Patient safety screen outcome[de-identified] Failed Failed      Not Ready for Weaning due to[de-identified] Underline problem not resolved Underline problem not resolved                Physical Exam:   Assessment Type: (P) Assess only  General Appearance: (P) Sedated  Respiratory Pattern: (P) Assisted  Chest Assessment: (P) Chest expansion symmetrical  Bilateral Breath Sounds: (P) Diminished  R Breath Sounds: Inspiratory wheezes, Diminished  L Breath Sounds: Inspiratory wheezes, Diminished  Cough: (P) Productive  Suction: (P) ET Tube  O2 Device: (P) vent      Resp Comments: (P) Pt stable and comfortable on current vent settings, no changes made at this time

## 2023-02-14 NOTE — PROGRESS NOTES
Daily Progress Note - Critical Care   Kevin Huynh  74 y o  male MRN: [id number]  Unit/Bed#: MICU 10 Encounter: [id number]        ----------------------------------------------------------------------------------------  HPI/24hr events: HX and PE limited by: Respiratory distress, intubated, sedated  Kevin Huynh  is a 74 y o  male who presents with has a past medical history of end-stage COPD (FEV1 22%), heart failure with reduced ejection fraction (EF 40%), pulmonary nodules, protein-calorie malnutrition, hyperlipidemia, gastroesophageal reflux, impaired fasting glucose, osteoarthritis, osteoporosis, vitamin D deficiency, chronic hypoxemic and hypercapnic respiratory failure, benign prostate hyperplasia, prostate cancer, and former tobacco (105 PYH; quit 2012) abuse presents to [facility] due to shortness of breath       History obtained from ED documentation, chart review, and patient's spouse due to acute clinical situation  Per above the patient's symptoms have been starting over the past 1-2 days  He had been more drowsy than his baseline per wife  Falling asleep in his wheelchair  He was complaining of shortness of breath  The day prior to arrival he was hallucinating stating "something about a blue light" overnight he was in severe respiratory distress and was unable to speak  Wife became concerned and called 911  EMS arrival found the patient in severe respiratory distress and he was subsequently intubated in the field  Wife denies recent sick contacts, fever at home, no coughing, no sputum production  Wheezing at home  Short of breath       History obtained from spouse, chart review and unobtainable from patient due to respiratory failure and intubation/sedation     ---------------------------------------------------------------------------------------  SUBJECTIVE  Remains sedated  Following simple commands, able to open eyes and move extremities on command  No acute overnight events   Noted to have decreased UOP of 20about 20cc over 4 hours yesterday, given albumin yesterday  Review of Systems  Review of systems was unable to be performed secondary to intubated and sedated  ---------------------------------------------------------------------------------------  Assessment and Plan:    #Endstage COPD, FEV1 22%, in acute exacerbation  #Acute on chronic HFrEF 40%  #Acute encephalopathy  #Hyponatremia  #Macrocytic anemia  #Pulmonary nodules    Plan:     Neuro:   · Sedated on propofol with fentanyl bolus  RASS goal 0 to -1  CPOT>2  · Minimally interacting on sedation  · Propofol decreased to 20mcg from 25mcg, on precedex   5 for sedation  · Will hold Propofol, reassess mental status, and plan for SBT today     CV:   · Hypotensive initially following induction and loss of sympathetic drive  · Shock likely secondary to distributive etiology in s/o possible PNA; SvO2 76, unlikely cardiogenic  · S/P one-time dose 40 mg IV lasix on 2/12  Monitor UOP   · Briefly on epinephrine, now weaned off  · now on levo 1mcg at 3 8cc/hr with propofol 20mcg at 7 4cc/hr for sedation  · Continue vasopressin gtt to augment levo for increased vascular tone  · MAP goal >65  · Will wean off levo  · Initiate IV lasix 20  · Continue with ASCVD prophylaxis with ASA and statin     Pulm:  · Clinically appears to be in acute COPD exacerbation  · Continue aggressive bronchodilators-- Xopenx/Atrovent QID, perforomist/pulmicort BID  · Continue Solu-medrol 40 mg TID  · Remains on ventilatory support  Repeat ABG showing chronic hypercapnia but improved pH  · Ventilator settings: AC/VC, seat at 20, patient rate 20, 350, 6PEEP, 40% FiO2, with goal sats 88%-92%  · Will hold Propofol, reassess mental status, and plan for SBT today  · Goals of care discussions with wife ongoing- wife states she is unsure of patient's vission re code status; previously, patient's outpatient pulmonologist discussed hospice care with patient     · Palliative care following, appreciate recommendations      GI:   · Known hx of GERD  · Continue stress ulcer prophylaxis in setting of concern for prolonged intubation  · Continue TF     :   · No active issues  · Continue tyler placement for accurate measurements of I&Os while attempting diuresis        F/E/N:   · Diagnosis: Hyponatremia  · Na <135, serum osm low at 276, patient hypervolemic on exam, urine sodium low <5, patient hypervolemic on exam, concerning hypervolemic hyponatremia  · Etiology likely HF exacerbation vs hypoalbuminemia   · S/P albumin 5% 25g yesterday, which improved sodium to 132 this AM, vs  129 on prior  · IV lasix 20mg for diuresis   · Continue tube feeding   · Monitor electrolytes; goal K>4, Phos>3, Mag>2  · Replete calcium with calcium gluconate 2g   · Avoid additional IVF d/t  decompensated heart failrue       Heme/Onc:   · Diagnosis: Pulmonary nodules  · Stable on imaging  · Serial monitoring outpatient  · Diagnosis: Documented history of prostate cancer  · Diagnosis: Anemia  · Hgb continues downward trend, now at 7 4 vs 8 1 from prior   No overt signs of GI bleed  · Obtain iron panel  · Obtain heme occult  · Continue DVT ppx        Endo:   · Fasting BS elevated at 204   · Likely in setting of steroid treatment  · Will start SSI for improved BS control, goal 140-180  · Obtain Hgb A1c        ID:   · Meets SIRS criteria on admission, procalcitonin elevated  · Continue ceftriaxone and 3-day course of azithromycin  · Trend CBC and fever curve     MSK/Skin:   · Pressure ulcer ppx  · UOOB and early mobilization        Patient appropriate for transfer out of the ICU today?: No  Disposition: Admit to Critical Care   Code Status: Level 1 - Full Code  ---------------------------------------------------------------------------------------  ICU CORE MEASURES    Prophylaxis   VTE Pharmacologic Prophylaxis: Enoxaparin (Lovenox)  VTE Mechanical Prophylaxis: sequential compression device  Stress Ulcer Prophylaxis: Omeprazole PO    ABCDE Protocol (if indicated)  Plan to perform spontaneous awakening trial today? No  Plan to perform spontaneous breathing trial today? No  Obvious barriers to extubation? Yes  CAM-ICU: Positive    Invasive Devices Review  Invasive Devices     Central Venous Catheter Line  Duration           CVC Central Lines 23 Triple 16cm 1 day          Peripheral Intravenous Line  Duration           Peripheral IV 23 Distal;Right;Ventral (anterior) Forearm 2 days    Peripheral IV 23 Left Antecubital 2 days    Peripheral IV 23 Left Forearm 2 days          Arterial Line  Duration           Arterial Line 23 Left Radial 2 days          Drain  Duration           NG/OG/Enteral Tube Orogastric 18 Fr Center mouth 2 days    Urethral Catheter 1 day          Airway  Duration           ETT  Oral 7 mm 2 days              Can any invasive devices be discontinued today? No  ---------------------------------------------------------------------------------------  OBJECTIVE    Vitals   Vitals:    23 0300 23 0400 23 0500 23 0600   BP: 117/76 111/71 101/61 111/67   Pulse: 72 64 66 64   Resp:  20     Temp: 98 2 °F (36 8 °C) 97 5 °F (36 4 °C) (!) 97 2 °F (36 2 °C) 97 5 °F (36 4 °C)   TempSrc:  Bladder     SpO2: 99% 100% 97% 99%   Weight:    56 4 kg (124 lb 5 4 oz)   Height:         Temp (24hrs), Av 8 °F (37 1 °C), Min:97 2 °F (36 2 °C), Max:100 °F (37 8 °C)  Current: Temperature: 97 5 °F (36 4 °C)  HR: 86  BP: 113/67  RR: 23  SpO2: 98%    Respiratory:  SpO2 Device: O2 Device: Ventilator       Invasive/non-invasive ventilation settings   Respiratory    Lab Data (Last 4 hours)    None         O2/Vent Data (Last 4 hours)    None                Physical Exam  Constitutional:       Appearance: He is ill-appearing  Comments: Intubated and sedated  Cachectic    HENT:      Head: Normocephalic and atraumatic        Mouth/Throat:      Mouth: Mucous membranes are moist  Cardiovascular:      Heart sounds: Normal heart sounds  Pulmonary:      Breath sounds: Normal breath sounds  No wheezing or rhonchi  Abdominal:      General: Bowel sounds are normal  There is no distension  Palpations: Abdomen is soft  Musculoskeletal:      Right lower leg: No edema  Left lower leg: No edema  Skin:     General: Skin is warm            Laboratory and Diagnostics:  Results from last 7 days   Lab Units 02/14/23  0517 02/13/23  0429 02/12/23  0759 02/12/23  0529   WBC Thousand/uL 7 51 10 91*  --  7 24   HEMOGLOBIN g/dL 7 4* 8 1*  --  8 9*   HEMATOCRIT % 23 0* 25 7*  --  28 5*   PLATELETS Thousands/uL 179 208 220 204   NEUTROS PCT % 91* 87*  --  73   MONOS PCT % 6 10  --  17*     Results from last 7 days   Lab Units 02/14/23  0517 02/13/23  0429 02/12/23  0529   SODIUM mmol/L 132* 129* 129*   POTASSIUM mmol/L 3 7 3 5 4 0   CHLORIDE mmol/L 88* 85* 86*   CO2 mmol/L 38* 38* 39*   ANION GAP mmol/L 6 6 4   BUN mg/dL 31* 20 20   CREATININE mg/dL 0 51* 0 51* 0 56*   CALCIUM mg/dL 8 2* 8 2* 8 2*   GLUCOSE RANDOM mg/dL 204* 139 114   ALT U/L 23 22 24   AST U/L 38 44 39   ALK PHOS U/L 44* 53 64   ALBUMIN g/dL 3 2* 2 9* 2 9*   TOTAL BILIRUBIN mg/dL 0 53 0 63 0 51     Results from last 7 days   Lab Units 02/14/23  0517 02/13/23  0429   MAGNESIUM mg/dL 2 2 2 0   PHOSPHORUS mg/dL 3 7 3 1      Results from last 7 days   Lab Units 02/12/23  0529   INR  0 93   PTT seconds 27          Results from last 7 days   Lab Units 02/12/23  0529   LACTIC ACID mmol/L 1 2     ABG:  Results from last 7 days   Lab Units 02/13/23  1304   PH ART  7 471*   PCO2 ART mm Hg 57 0*   PO2 ART mm Hg 100 0   HCO3 ART mmol/L 40 6*   BASE EXC ART mmol/L 15 1   ABG SOURCE  Line, Arterial     VBG:  Results from last 7 days   Lab Units 02/13/23  1304 02/13/23  1204   PH JHONATHAN   --  7 458*   PCO2 JHONATHAN mm Hg  --  57 5*   PO2 JHONATHAN mm Hg  --  39 7   HCO3 JHONATHAN mmol/L  --  39 8*   BASE EXC JHONATHAN mmol/L  --  14 1   ABG SOURCE  Line, Arterial  -- Results from last 7 days   Lab Units 02/13/23  0429 02/12/23  0529   PROCALCITONIN ng/ml 0 87* 0 05       Micro  Results from last 7 days   Lab Units 02/12/23  0834 02/12/23  0803 02/12/23  0529   BLOOD CULTURE   --   --  No Growth at 24 hrs  No Growth at 24 hrs  SPUTUM CULTURE  Culture too young- will reincubate  --   --    GRAM STAIN RESULT  2+ Gram positive cocci in pairs*  2+ Gram negative rods*  Rare Polys*  --   --    LEGIONELLA URINARY ANTIGEN   --  Negative  --    STREP PNEUMONIAE ANTIGEN, URINE   --  Negative  --        EKG: NSR, PACs, PVCs rate 84  Imaging:   XR chest portable ICU   Final Result by Isac Barba MD (02/13 1223)   Tubes and lines as above without pneumothorax  Recommend advancement of nasogastric tube  No acute cardiopulmonary disease  The study was marked in [facility] for immediate notification  Workstation performed: [id number]         XR chest portable   Final Result by Almita Becerra MD (84/10 5457)      1  Persistent high positioning of the endotracheal tube  Advancement by at least 4 cm advised  2   Tip of right internal jugular central venous catheter projects over the lower SVC  No pneumothorax  3   Persistent pulmonary vascular congestion  The study was marked in [facility] for immediate notification  Workstation performed: [id number]         CT head wo contrast   Final Result by Thiago Eller MD (02/12 1241)      No acute intracranial abnormality  Workstation performed: [id number]         XR chest 1 view portable   ED Interpretation by Ana María Quinn MD (02/12 3815)   Increased cephalization on LEFT > RIGHT  ITubated       Final Result by Rody Agosto MD (02/12 0720)      Moderate pulmonary venous congestion  ET tube 6 cm above the ghada  Workstation performed: [id number]            I have personally reviewed pertinent reports        Intake and Output  I/O       02/11 0701  02/12 0700 02/12 0701 02/13 0700 02/13 0701 02/14 0700    I V  (mL/kg)  727 6 (13 4)     IV Piggyback  475     Total Intake(mL/kg)  1202 6 (22 2)     Urine (mL/kg/hr)  2135 (1 6)     Emesis/NG output  50     Stool  0     Total Output  2185     Net  -982  4            Unmeasured Stool Occurrence  0 x         UOP: 2135 ml/hr     Height and Weights   Height: 5' (152 4 cm)  IBW (Ideal Body Weight): 50 kg  Body mass index is 24 28 kg/m²  Weight (last 2 days)     Date/Time Weight    02/14/23 0600 56 4 (124 34)    02/13/23 0542 54 2 (119 49)    02/12/23 0900 61 6 (135 8)    02/12/23 0513 61 6 (135 8)            Nutrition       Diet Orders   (From admission, onward)             Start     Ordered    02/13/23 1507  Diet Enteral/Parenteral; Tube Feeding No Oral Diet; Vital AF 1 2; Cyclic; 50; 22 hours; 374; Water; Every 6 hours  Diet effective now        Comments: Advance by 10mL/hr every 4 hours to goal  Hold TF for 1 hour before and 1 hour after omeprazole administration to avoid drug-nutrient interaction  References:    Nutrtion Support Algorithm Enteral vs  Parenteral   Question Answer Comment   Diet Type Enteral/Parenteral    Enteral/Parenteral Tube Feeding No Oral Diet    Tube Feeding Formula: Vital AF 1 2    Bolus/Cyclic/Continuous Cyclic    Tube Feeding Cyclic Rate (mL/hr): 50    Tube Feeding Cyclic: Administer over: 22 hours    Tube Feeding flush (mL): 100    Water Flush type: Water    Water flush frequency: Every 6 hours    RD to adjust diet per protocol?  Yes        02/13/23 1508                  Active Medications  Scheduled Meds:  Current Facility-Administered Medications   Medication Dose Route Frequency Provider Last Rate   • aspirin  81 mg Oral Daily Mary Singh DO     • azithromycin  500 mg Oral Q24H Mary Singh DO     • budesonide  0 5 mg Nebulization Q12H Mary Singh DO     • cefTRIAXone  1,000 mg Intravenous Q24H Kwadwo Rg MD 1,000 mg (02/14/23 0062)   • chlorhexidine  15 mL Mouth/Throat Q12H [address] Idalia [place], DO     • dexmedetomidine  0 1-0 7 mcg/kg/hr Intravenous Titrated Idetino Mcdaniels, DO 0 5 mcg/kg/hr (02/14/23 0018)   • enoxaparin  40 mg Subcutaneous Daily Idalia Jonas, DO     • ergocalciferol  50,000 Units Oral Q28 Days Idalia Jonas, DO     • fentanyl citrate (PF)  50 mcg Intravenous Q1H PRN Idalia [place], DO     • fentanyl citrate (PF)  50 mcg Intravenous Once Montse Zhong MD     • formoterol  20 mcg Nebulization Q12H Idalia Jonas, DO     • levalbuterol  1 25 mg Nebulization TID Montse Zhong MD      And   • ipratropium  0 5 mg Nebulization TID Montse Zhong MD     • methylPREDNISolone sodium succinate  40 mg Intravenous [facility] Idalia [place], DO     • norepinephrine  1-30 mcg/min Intravenous Titrated Idalia Jonas, DO 1 mcg/min (02/14/23 0520)   • omeprazole (PRILOSEC) suspension 2 mg/mL  20 mg Oral Daily Idalia [place], DO     • pravastatin  80 mg Oral Daily With Andrew Ríos DO     • propofol  5-50 mcg/kg/min Intravenous Titrated Zev De Los Santos MD 20 mcg/kg/min (02/14/23 0723)   • vasopressin  0 04 Units/min Intravenous Continuous Kamron Ye, DO 0 04 Units/min (02/14/23 0420)     Continuous Infusions:  dexmedetomidine, 0 1-0 7 mcg/kg/hr, Last Rate: 0 5 mcg/kg/hr (02/14/23 0018)  norepinephrine, 1-30 mcg/min, Last Rate: 1 mcg/min (02/14/23 0520)  propofol, 5-50 mcg/kg/min, Last Rate: 20 mcg/kg/min (02/14/23 0723)  vasopressin, 0 04 Units/min, Last Rate: 0 04 Units/min (02/14/23 0420)      PRN Meds:   fentanyl citrate (PF), 50 mcg, Q1H PRN        Allergies   No Known Allergies  ---------------------------------------------------------------------------------------  Advance Directive and Living Will:      Power of :    POLST:    ---------------------------------------------------------------------------------------  Care Time Delivered:   Upon my evaluation, this patient had a high probability of imminent or life-threatening deterioration due to COPD exacerbation requirng mechanical ventillation, which required my direct attention, intervention, and personal management  I have personally provided 30 minute of critical care time, exclusive of procedures, teaching, family meetings, and any prior time recorded by providers other than myself  Onita Hence, DO      Portions of the record may have been created with voice recognition software  Occasional wrong word or "sound a like" substitutions may have occurred due to the inherent limitations of voice recognition software    Read the chart carefully and recognize, using context, where substitutions have occurred

## 2023-02-14 NOTE — PROGRESS NOTES
Pastoral Care Progress Note    2023  Patient: Yoanna Tejada  : 1955  Admission Date & Time: 2023 0507  MRN: [id number] [facility]: [id number]         23 1400   Clinical Encounter Type   Visited With Patient and family together   Routine Visit Follow-up   Referral From Physician   Referral To [address] [place] [address] followed-up with pt and provided silent presence, support and prayer while pt was intubated and not alert  I have been trying to follow-up with the pt's wife, Donna Brody, but unfortunately keep missing her  I left her some supportive resources that I gathered on one of the chairs in her 's room  I will follow-up tomorrow  Chaplains still remain available

## 2023-02-14 NOTE — RESPIRATORY THERAPY NOTE
RT Ventilator Management Note  Maicol Chen  74 y o  male MRN: [id number]  Unit/Bed#: [facility] 10 Encounter: [id number]      Daily Screen         2/12/2023  0830 2/13/2023  0814          Patient safety screen outcome[de-identified] Failed Failed      Not Ready for Weaning due to[de-identified] Underline problem not resolved Underline problem not resolved                Physical Exam:   Assessment Type: (P) During-treatment  General Appearance: (P) Sedated  Respiratory Pattern: (P) Assisted  Chest Assessment: (P) Chest expansion symmetrical  Bilateral Breath Sounds: (P) Diminished  R Breath Sounds: Inspiratory wheezes, Diminished  L Breath Sounds: Inspiratory wheezes, Diminished  Cough: (P) Productive  Suction: (P) ET Tube  O2 Device: (P) vent      Resp Comments: (P) Pt doing well on current vent settings, breathing tx given through aerogen, no changes made to the vent at this time

## 2023-02-15 ENCOUNTER — APPOINTMENT (INPATIENT)
Dept: RADIOLOGY | Facility: HOSPITAL | Age: 68
End: 2023-02-15

## 2023-02-15 LAB
ALBUMIN SERPL BCP-MCNC: 3 G/DL (ref 3.5–5)
ALP SERPL-CCNC: 49 U/L (ref 46–116)
ALT SERPL W P-5'-P-CCNC: 24 U/L (ref 12–78)
ANION GAP SERPL CALCULATED.3IONS-SCNC: 2 MMOL/L (ref 4–13)
AST SERPL W P-5'-P-CCNC: 32 U/L (ref 5–45)
BASOPHILS # BLD AUTO: 0 THOUSANDS/ÂΜL (ref 0–0.1)
BASOPHILS NFR BLD AUTO: 0 % (ref 0–1)
BILIRUB SERPL-MCNC: 0.48 MG/DL (ref 0.2–1)
BUN SERPL-MCNC: 38 MG/DL (ref 5–25)
CA-I BLD-SCNC: 1.07 MMOL/L (ref 1.12–1.32)
CALCIUM ALBUM COR SERPL-MCNC: 9 MG/DL (ref 8.3–10.1)
CALCIUM SERPL-MCNC: 8.2 MG/DL (ref 8.3–10.1)
CHLORIDE SERPL-SCNC: 88 MMOL/L (ref 96–108)
CO2 SERPL-SCNC: 40 MMOL/L (ref 21–32)
CREAT SERPL-MCNC: 0.45 MG/DL (ref 0.6–1.3)
EOSINOPHIL # BLD AUTO: 0 THOUSAND/ÂΜL (ref 0–0.61)
EOSINOPHIL NFR BLD AUTO: 0 % (ref 0–6)
ERYTHROCYTE [DISTWIDTH] IN BLOOD BY AUTOMATED COUNT: 12.8 % (ref 11.6–15.1)
GFR SERPL CREATININE-BSD FRML MDRD: 118 ML/MIN/1.73SQ M
GLUCOSE SERPL-MCNC: 136 MG/DL (ref 65–140)
GLUCOSE SERPL-MCNC: 245 MG/DL (ref 65–140)
GLUCOSE SERPL-MCNC: 247 MG/DL (ref 65–140)
GLUCOSE SERPL-MCNC: 266 MG/DL (ref 65–140)
GLUCOSE SERPL-MCNC: 276 MG/DL (ref 65–140)
GLUCOSE SERPL-MCNC: 284 MG/DL (ref 65–140)
HCT VFR BLD AUTO: 22.5 % (ref 36.5–49.3)
HGB BLD-MCNC: 7.2 G/DL (ref 12–17)
IMM GRANULOCYTES # BLD AUTO: 0.04 THOUSAND/UL (ref 0–0.2)
IMM GRANULOCYTES NFR BLD AUTO: 0 % (ref 0–2)
LYMPHOCYTES # BLD AUTO: 0.17 THOUSANDS/ÂΜL (ref 0.6–4.47)
LYMPHOCYTES NFR BLD AUTO: 2 % (ref 14–44)
MAGNESIUM SERPL-MCNC: 2.2 MG/DL (ref 1.6–2.6)
MCH RBC QN AUTO: 31.7 PG (ref 26.8–34.3)
MCHC RBC AUTO-ENTMCNC: 32 G/DL (ref 31.4–37.4)
MCV RBC AUTO: 99 FL (ref 82–98)
MONOCYTES # BLD AUTO: 0.88 THOUSAND/ÂΜL (ref 0.17–1.22)
MONOCYTES NFR BLD AUTO: 9 % (ref 4–12)
NEUTROPHILS # BLD AUTO: 8.88 THOUSANDS/ÂΜL (ref 1.85–7.62)
NEUTS SEG NFR BLD AUTO: 89 % (ref 43–75)
NRBC BLD AUTO-RTO: 0 /100 WBCS
PHOSPHATE SERPL-MCNC: 2.5 MG/DL (ref 2.3–4.1)
PLATELET # BLD AUTO: 179 THOUSANDS/UL (ref 149–390)
PMV BLD AUTO: 10.1 FL (ref 8.9–12.7)
POTASSIUM SERPL-SCNC: 3.5 MMOL/L (ref 3.5–5.3)
PROT SERPL-MCNC: 5.2 G/DL (ref 6.4–8.4)
RBC # BLD AUTO: 2.27 MILLION/UL (ref 3.88–5.62)
SODIUM SERPL-SCNC: 130 MMOL/L (ref 135–147)
WBC # BLD AUTO: 9.97 THOUSAND/UL (ref 4.31–10.16)

## 2023-02-15 RX ORDER — CALCIUM GLUCONATE 20 MG/ML
1 INJECTION, SOLUTION INTRAVENOUS ONCE
Status: COMPLETED | OUTPATIENT
Start: 2023-02-15 | End: 2023-02-15

## 2023-02-15 RX ORDER — ALBUTEROL SULFATE 2.5 MG/3ML
10 SOLUTION RESPIRATORY (INHALATION) ONCE
Status: COMPLETED | OUTPATIENT
Start: 2023-02-15 | End: 2023-02-15

## 2023-02-15 RX ORDER — FUROSEMIDE 10 MG/ML
20 INJECTION INTRAMUSCULAR; INTRAVENOUS
Status: DISCONTINUED | OUTPATIENT
Start: 2023-02-15 | End: 2023-02-17

## 2023-02-15 RX ORDER — INSULIN LISPRO 100 [IU]/ML
2-12 INJECTION, SOLUTION INTRAVENOUS; SUBCUTANEOUS EVERY 6 HOURS SCHEDULED
Status: DISCONTINUED | OUTPATIENT
Start: 2023-02-15 | End: 2023-02-17

## 2023-02-15 RX ORDER — POTASSIUM CHLORIDE 20MEQ/15ML
40 LIQUID (ML) ORAL ONCE
Status: COMPLETED | OUTPATIENT
Start: 2023-02-15 | End: 2023-02-15

## 2023-02-15 RX ORDER — FERROUS SULFATE 325(65) MG
325 TABLET ORAL EVERY OTHER DAY
Status: DISCONTINUED | OUTPATIENT
Start: 2023-02-15 | End: 2023-02-28 | Stop reason: HOSPADM

## 2023-02-15 RX ADMIN — CEFTRIAXONE 1000 MG: 1 INJECTION, POWDER, FOR SOLUTION INTRAMUSCULAR; INTRAVENOUS at 06:25

## 2023-02-15 RX ADMIN — BUDESONIDE 0.5 MG: 0.5 INHALANT ORAL at 19:46

## 2023-02-15 RX ADMIN — ALBUTEROL SULFATE 10 MG: 2.5 SOLUTION RESPIRATORY (INHALATION) at 10:14

## 2023-02-15 RX ADMIN — CHLORHEXIDINE GLUCONATE 0.12% ORAL RINSE 15 ML: 1.2 LIQUID ORAL at 08:54

## 2023-02-15 RX ADMIN — METHYLPREDNISOLONE SODIUM SUCCINATE 40 MG: 40 INJECTION, POWDER, FOR SOLUTION INTRAMUSCULAR; INTRAVENOUS at 13:40

## 2023-02-15 RX ADMIN — IPRATROPIUM BROMIDE 0.5 MG: 0.5 SOLUTION RESPIRATORY (INHALATION) at 07:54

## 2023-02-15 RX ADMIN — INSULIN LISPRO 4 UNITS: 100 INJECTION, SOLUTION INTRAVENOUS; SUBCUTANEOUS at 11:54

## 2023-02-15 RX ADMIN — METHYLPREDNISOLONE SODIUM SUCCINATE 40 MG: 40 INJECTION, POWDER, FOR SOLUTION INTRAMUSCULAR; INTRAVENOUS at 22:05

## 2023-02-15 RX ADMIN — INSULIN LISPRO 4 UNITS: 100 INJECTION, SOLUTION INTRAVENOUS; SUBCUTANEOUS at 18:18

## 2023-02-15 RX ADMIN — DEXMEDETOMIDINE HYDROCHLORIDE 0.5 MCG/KG/HR: 4 INJECTION, SOLUTION INTRAVENOUS at 04:43

## 2023-02-15 RX ADMIN — INSULIN LISPRO 3 UNITS: 100 INJECTION, SOLUTION INTRAVENOUS; SUBCUTANEOUS at 05:26

## 2023-02-15 RX ADMIN — IPRATROPIUM BROMIDE 0.5 MG: 0.5 SOLUTION RESPIRATORY (INHALATION) at 19:46

## 2023-02-15 RX ADMIN — Medication 20 MG: at 08:54

## 2023-02-15 RX ADMIN — LEVALBUTEROL HYDROCHLORIDE 1.25 MG: 1.25 SOLUTION, CONCENTRATE RESPIRATORY (INHALATION) at 19:46

## 2023-02-15 RX ADMIN — FORMOTEROL FUMARATE DIHYDRATE 20 MCG: 20 SOLUTION RESPIRATORY (INHALATION) at 19:46

## 2023-02-15 RX ADMIN — CHLORHEXIDINE GLUCONATE 0.12% ORAL RINSE 15 ML: 1.2 LIQUID ORAL at 22:05

## 2023-02-15 RX ADMIN — INSULIN LISPRO 2 UNITS: 100 INJECTION, SOLUTION INTRAVENOUS; SUBCUTANEOUS at 00:40

## 2023-02-15 RX ADMIN — LEVALBUTEROL HYDROCHLORIDE 1.25 MG: 1.25 SOLUTION, CONCENTRATE RESPIRATORY (INHALATION) at 07:54

## 2023-02-15 RX ADMIN — FORMOTEROL FUMARATE DIHYDRATE 20 MCG: 20 SOLUTION RESPIRATORY (INHALATION) at 07:54

## 2023-02-15 RX ADMIN — VASOPRESSIN 0.04 UNITS/MIN: 20 INJECTION INTRAVENOUS at 13:40

## 2023-02-15 RX ADMIN — FENTANYL CITRATE 50 MCG: 50 INJECTION INTRAMUSCULAR; INTRAVENOUS at 04:46

## 2023-02-15 RX ADMIN — ENOXAPARIN SODIUM 40 MG: 40 INJECTION SUBCUTANEOUS at 08:54

## 2023-02-15 RX ADMIN — FERROUS SULFATE TAB 325 MG (65 MG ELEMENTAL FE) 325 MG: 325 (65 FE) TAB at 10:19

## 2023-02-15 RX ADMIN — FUROSEMIDE 20 MG: 10 INJECTION, SOLUTION INTRAMUSCULAR; INTRAVENOUS at 16:29

## 2023-02-15 RX ADMIN — VASOPRESSIN 0.04 UNITS/MIN: 20 INJECTION INTRAVENOUS at 04:45

## 2023-02-15 RX ADMIN — METHYLPREDNISOLONE SODIUM SUCCINATE 40 MG: 40 INJECTION, POWDER, FOR SOLUTION INTRAMUSCULAR; INTRAVENOUS at 05:26

## 2023-02-15 RX ADMIN — POTASSIUM CHLORIDE 40 MEQ: 1.5 SOLUTION ORAL at 10:19

## 2023-02-15 RX ADMIN — CALCIUM GLUCONATE 1 G: 20 INJECTION, SOLUTION INTRAVENOUS at 08:54

## 2023-02-15 RX ADMIN — IPRATROPIUM BROMIDE 0.5 MG: 0.5 SOLUTION RESPIRATORY (INHALATION) at 13:40

## 2023-02-15 RX ADMIN — LEVALBUTEROL HYDROCHLORIDE 1.25 MG: 1.25 SOLUTION, CONCENTRATE RESPIRATORY (INHALATION) at 13:40

## 2023-02-15 RX ADMIN — ASPIRIN 81 MG CHEWABLE TABLET 81 MG: 81 TABLET CHEWABLE at 08:54

## 2023-02-15 RX ADMIN — BUDESONIDE 0.5 MG: 0.5 INHALANT ORAL at 07:54

## 2023-02-15 RX ADMIN — FENTANYL CITRATE 50 MCG: 50 INJECTION INTRAMUSCULAR; INTRAVENOUS at 09:33

## 2023-02-15 NOTE — ACP (ADVANCE CARE PLANNING)
Record of Family Meeting - Palliative and [place]  74 y o  male [id number]      Recommendations and Plan:  Patient to remain full cares until extubation  After extubation plan will be to provide best supportive care without reintubation (level 3 code status)  Should he deteriorate to the point of needing reintubation patient/spouse are agreeable to proceeding with comfort measures  Otherwise all cares requested to that point  Ultimate goal would for Ayse Rivera to die at home and we discussed hospice services today  Wife is supported by  Shaka Block  Offered to engage with patient's children as well  PSC to follow  Time of Meeting: 10:00  Meeting Location: patient room  Participants: [address] [place] [address], spouse, patient  Ian Villaseñor MD     Patient Participation: limited  Nodding y/n  Unable to write or point  Patient Support System: family     Advanced Directive of POLST available: none on file    Topics of Discussion:  Reviewed patient's medical course including previous hospitalizations  Discussed patient's current difficulty liberating from ventilator  Other Content of Meeting:  Extensive time in support of spouse and attempting to allow Ayse Rivera to participate despite being intubated  Discussed code status and different post-extubation treatment options, including hospice cares  Time Involved in Meetin minutes, beginning at approximately  10 and ending at approximately 11:00  Ian Villaseñor MD   Palliative and Supportive Care  Clinic/Answering Service: [phone]  You can find me on Tracie!

## 2023-02-15 NOTE — QUICK NOTE
Explained all new results and on-going treatment plan to the patient's wife and son with  at bedside  All questions and concerns were answered and addressed appropriately         ----------------------------------------------------  Andreina Chen DO, MS, PGY-1  Internal Medicine Residency   [address] [place] [address]

## 2023-02-15 NOTE — PLAN OF CARE
Problem: SAFETY,RESTRAINT: NV/NON-SELF DESTRUCTIVE BEHAVIOR  Goal: Remains free of harm/injury (restraint for non violent/non self-detsructive behavior)  Description: INTERVENTIONS:  - Instruct patient/family regarding restraint use   - Assess and monitor physiologic and psychological status   - Provide interventions and comfort measures to meet assessed patient needs   - Identify and implement measures to help patient regain control  - Assess readiness for release of restraint   Outcome: Progressing  Goal: Returns to optimal restraint-free functioning  Description: INTERVENTIONS:  - Assess the patient's behavior and symptoms that indicate continued need for restraint  - Identify and implement measures to help patient regain control  - Assess readiness for release of restraint   Outcome: Progressing     Problem: MOBILITY - ADULT  Goal: Maintain or return to baseline ADL function  Description: INTERVENTIONS:  -  Assess patient's ability to carry out ADLs; assess patient's baseline for ADL function and identify physical deficits which impact ability to perform ADLs (bathing, care of mouth/teeth, toileting, grooming, dressing, etc )  - Assess/evaluate cause of self-care deficits   - Assess range of motion  - Assess patient's mobility; develop plan if impaired  - Assess patient's need for assistive devices and provide as appropriate  - Encourage maximum independence but intervene and supervise when necessary  - Involve family in performance of ADLs  - Assess for home care needs following discharge   - Consider OT consult to assist with ADL evaluation and planning for discharge  - Provide patient education as appropriate  Outcome: Progressing  Goal: Maintains/Returns to pre admission functional level  Description: INTERVENTIONS:  - Perform BMAT or MOVE assessment daily    - Set and communicate daily mobility goal to care team and patient/family/caregiver     - Collaborate with rehabilitation services on mobility goals if consulted  - Perform Range of Motion 3 times a day  - Reposition patient every 2 hours    - Record patient progress and toleration of activity level   Outcome: Progressing     Problem: PAIN - ADULT  Goal: Verbalizes/displays adequate comfort level or baseline comfort level  Description: Interventions:  - Encourage patient to monitor pain and request assistance  - Assess pain using appropriate pain scale  - Administer analgesics based on type and severity of pain and evaluate response  - Implement non-pharmacological measures as appropriate and evaluate response  - Consider cultural and social influences on pain and pain management  - Notify physician/advanced practitioner if interventions unsuccessful or patient reports new pain  Outcome: Progressing     Problem: INFECTION - ADULT  Goal: Absence or prevention of progression during hospitalization  Description: INTERVENTIONS:  - Assess and monitor for signs and symptoms of infection  - Monitor lab/diagnostic results  - Monitor all insertion sites, i e  indwelling lines, tubes, and drains  - Monitor endotracheal if appropriate and nasal secretions for changes in amount and color  - Snyder appropriate cooling/warming therapies per order  - Administer medications as ordered  - Instruct and encourage patient and family to use good hand hygiene technique  - Identify and instruct in appropriate isolation precautions for identified infection/condition  Outcome: Progressing  Goal: Absence of fever/infection during neutropenic period  Description: INTERVENTIONS:  - Monitor WBC    Outcome: Progressing     Problem: SAFETY ADULT  Goal: Maintain or return to baseline ADL function  Description: INTERVENTIONS:  -  Assess patient's ability to carry out ADLs; assess patient's baseline for ADL function and identify physical deficits which impact ability to perform ADLs (bathing, care of mouth/teeth, toileting, grooming, dressing, etc )  - Assess/evaluate cause of self-care deficits   - Assess range of motion  - Assess patient's mobility; develop plan if impaired  - Assess patient's need for assistive devices and provide as appropriate  - Encourage maximum independence but intervene and supervise when necessary  - Involve family in performance of ADLs  - Assess for home care needs following discharge   - Consider OT consult to assist with ADL evaluation and planning for discharge  - Provide patient education as appropriate  Outcome: Progressing  Goal: Maintains/Returns to pre admission functional level  Description: INTERVENTIONS:  - Perform BMAT or MOVE assessment daily    - Set and communicate daily mobility goal to care team and patient/family/caregiver  - Collaborate with rehabilitation services on mobility goals if consulted  - Perform Range of Motion 2 times a day  - Reposition patient every 2 hours    - Record patient progress and toleration of activity level   Outcome: Progressing  Goal: Patient will remain free of falls  Description: INTERVENTIONS:  - Educate patient/family on patient safety including physical limitations  - Instruct patient to call for assistance with activity   - Consult OT/PT to assist with strengthening/mobility   - Keep Call bell within reach  - Keep bed low and locked with side rails adjusted as appropriate  - Keep care items and personal belongings within reach  - Initiate and maintain comfort rounds  - Make Fall Risk Sign visible to staff  - Offer Toileting every 2 Hours, in advance of need  - Initiate/Maintain bed alarm  - Apply yellow socks and bracelet for high fall risk patients  - Consider moving patient to room near nurses station  Outcome: Progressing     Problem: DISCHARGE PLANNING  Goal: Discharge to home or other facility with appropriate resources  Description: INTERVENTIONS:  - Identify barriers to discharge w/patient and caregiver  - Arrange for needed discharge resources and transportation as appropriate  - Identify discharge learning needs (meds, wound care, etc )  - Arrange for interpretive services to assist at discharge as needed  - Refer to Case Management Department for coordinating discharge planning if the patient needs post-hospital services based on physician/advanced practitioner order or complex needs related to functional status, cognitive ability, or social support system  Outcome: Progressing     Problem: Knowledge Deficit  Goal: Patient/family/caregiver demonstrates understanding of disease process, treatment plan, medications, and discharge instructions  Description: Complete learning assessment and assess knowledge base  Interventions:  - Provide teaching at level of understanding  - Provide teaching via preferred learning methods  Outcome: Progressing     Problem: Prexisting or High Potential for Compromised Skin Integrity  Goal: Skin integrity is maintained or improved  Description: INTERVENTIONS:  - Identify patients at risk for skin breakdown  - Assess and monitor skin integrity  - Assess and monitor nutrition and hydration status  - Monitor labs   - Assess for incontinence   - Turn and reposition patient  - Assist with mobility/ambulation  - Relieve pressure over bony prominences  - Avoid friction and shearing  - Provide appropriate hygiene as needed including keeping skin clean and dry  - Evaluate need for skin moisturizer/barrier cream  - Collaborate with interdisciplinary team   - Patient/family teaching  - Consider wound care consult   Outcome: Progressing     Problem: Nutrition/Hydration-ADULT  Goal: Nutrient/Hydration intake appropriate for improving, restoring or maintaining nutritional needs  Description: Monitor and assess patient's nutrition/hydration status for malnutrition  Collaborate with interdisciplinary team and initiate plan and interventions as ordered  Monitor patient's weight and dietary intake as ordered or per policy  Utilize nutrition screening tool and intervene as necessary   Determine patient's food preferences and provide high-protein, high-caloric foods as appropriate       INTERVENTIONS:  - Monitor oral intake, urinary output, labs, and treatment plans  - Assess nutrition and hydration status and recommend course of action  - Evaluate amount of meals eaten  - Assist patient with eating if necessary   - Allow adequate time for meals  - Recommend/ encourage appropriate diets, oral nutritional supplements, and vitamin/mineral supplements  - Order, calculate, and assess calorie counts as needed  - Recommend, monitor, and adjust tube feedings and TPN/PPN based on assessed needs  - Assess need for intravenous fluids  - Provide specific nutrition/hydration education as appropriate  - Include patient/family/caregiver in decisions related to nutrition  Outcome: Progressing

## 2023-02-15 NOTE — RESPIRATORY THERAPY NOTE
RT Ventilator Management Note  Ulysses Ayala  74 y o  male MRN: [id number]  Unit/Bed#: [facility] 10 Encounter: [id number]      Daily Screen         2/14/2023  0752 2/15/2023  0754          Patient safety screen outcome[de-identified] Failed Failed      Not Ready for Weaning due to[de-identified] Underline problem not resolved Underline problem not resolved                Physical Exam:   Assessment Type: Assess only  Respiratory Pattern: Assisted  Chest Assessment: Chest expansion symmetrical  Bilateral Breath Sounds: Diminished  Cough: Productive  O2 Device: C3      Resp Comments: pt suctioned orally and down ett  cuff deflated and pt extubated at this time  pt placed on 6lpm humidified nc o2 no stridor noted

## 2023-02-15 NOTE — PROGRESS NOTES
Progress note - Palliative and Supportive Care   Jared Browning  74 y o  male [id number]    Patient Active Problem List   Diagnosis   • Coronary artery disease   • Cardiomyopathy, dilated ([facility] )   • Chronic obstructive pulmonary disease (HCC)   • Acute systolic congestive heart failure (HCC)   • Left bundle-branch block   • KEVIN and COPD overlap syndrome ([facility])   • Hypercholesterolemia   • Gastroesophageal reflux disease   • Impaired fasting glucose   • Osteoarthritis   • Osteoporosis   • Vitamin D deficiency   • Mood disorder ([facility])   • Other insomnia   • Macrocytosis without anemia   • Chronic hypoxemic respiratory failure (HCC)   • BPH with obstruction/lower urinary tract symptoms   • Prostate cancer ([facility] )   • Pulmonary nodules/lesions, multiple   • Former tobacco use   • Mild protein-calorie malnutrition (HCC)   • Chronic HFrEF (heart failure with reduced ejection fraction) ([facility] )     Active issues specifically addressed today include:   Acute on chronic respiratory failure  Goals of care  Palliative Care patient    Plan:  1  Symptom management -    - per ICU team    2  Goals - see ACP note  -         Interval history:       Patient alert and answering with y/n head shakes  Unable to write  Denies pain  Breathing tube uncomfortable but he is tolerating at this time  Family mtg as documented       MEDICATIONS / ALLERGIES:     all current active meds have been reviewed and current meds:   Current Facility-Administered Medications   Medication Dose Route Frequency   • aspirin chewable tablet 81 mg  81 mg Oral Daily   • budesonide (PULMICORT) inhalation solution 0 5 mg  0 5 mg Nebulization Q12H   • cefTRIAXone (ROCEPHIN) 1,000 mg in dextrose 5 % 50 mL IVPB  1,000 mg Intravenous Q24H   • chlorhexidine (PERIDEX) 0 12 % oral rinse 15 mL  15 mL Mouth/Throat Q12H [facility]   • dexmedeTOMIDine (Precedex) 400 mcg in sodium chloride 0 9% 100 mL  0 1-0 7 mcg/kg/hr Intravenous Titrated   • enoxaparin (LOVENOX) subcutaneous injection 40 mg  40 mg Subcutaneous Daily   • ergocalciferol (VITAMIN D2) capsule 50,000 Units  50,000 Units Oral Q28 Days   • fentanyl citrate (PF) 100 MCG/2ML 50 mcg  50 mcg Intravenous Q1H PRN   • fentanyl citrate (PF) 100 MCG/2ML 50 mcg  50 mcg Intravenous Once   • ferrous sulfate tablet 325 mg  325 mg Oral Every Other Day   • formoterol (PERFOROMIST) nebulizer solution 20 mcg  20 mcg Nebulization Q12H   • furosemide (LASIX) injection 20 mg  20 mg Intravenous BID (diuretic)   • insulin lispro (HumaLOG) 100 units/mL subcutaneous injection 2-12 Units  2-12 Units Subcutaneous Q6H [address]   • levalbuterol (XOPENEX) inhalation solution 1 25 mg  1 25 mg Nebulization TID    And   • ipratropium (ATROVENT) 0 02 % inhalation solution 0 5 mg  0 5 mg Nebulization TID   • methylPREDNISolone sodium succinate (Solu-MEDROL) injection 40 mg  40 mg Intravenous Q8H [address]   • norepinephrine (LEVOPHED) 4 mg (STANDARD CONCENTRATION) IV in sodium chloride 0 9% 250 mL  1-30 mcg/min Intravenous Titrated   • omeprazole (PRILOSEC) suspension 2 mg/mL  20 mg Oral Daily   • polyethylene glycol (MIRALAX) packet 17 g  17 g Oral Daily PRN   • pravastatin (PRAVACHOL) tablet 80 mg  80 mg Oral Daily With Dinner   • propofol (DIPRIVAN) 1000 mg in 100 mL infusion (premix)  5-50 mcg/kg/min Intravenous Titrated   • senna-docusate sodium (SENOKOT S) 8 6-50 mg per tablet 1 tablet  1 tablet Oral HS   • vasopressin (PITRESSIN) 20 Units in sodium chloride 0 9 % 100 mL infusion  0 04 Units/min Intravenous Continuous       No Known Allergies    OBJECTIVE:    Physical Exam  Physical Exam  Vitals and nursing note reviewed  Constitutional:       General: He is not in acute distress  Appearance: He is ill-appearing  He is not toxic-appearing  HENT:      Head: Atraumatic  Right Ear: External ear normal       Left Ear: External ear normal       Mouth/Throat:      Mouth: Mucous membranes are dry        Comments: ETT in place  Eyes:      General:         Right eye: No discharge  Left eye: No discharge  Cardiovascular:      Rate and Rhythm: Normal rate  Pulmonary:      Comments: Vented  Abdominal:      General: There is no distension  Palpations: Abdomen is soft  Skin:     General: Skin is warm and dry  Neurological:      Mental Status: He is alert  Comments: Patient is awake and alert, answers y/n questions  Unable to write  Appears to have reasonable comprehension though difficult to assess  Psychiatric:      Comments: Appropriately emotional during our conversation         Lab Results:   I have personally reviewed pertinent labs  , CBC:   Lab Results   Component Value Date    WBC 9 97 02/15/2023    HGB 7 2 (L) 02/15/2023    HCT 22 5 (L) 02/15/2023    MCV 99 (H) 02/15/2023     02/15/2023    MCH 31 7 02/15/2023    MCHC 32 0 02/15/2023    RDW 12 8 02/15/2023    MPV 10 1 02/15/2023    NRBC 0 02/15/2023   , CMP:   Lab Results   Component Value Date    SODIUM 130 (L) 02/15/2023    K 3 5 02/15/2023    CL 88 (L) 02/15/2023    CO2 40 (H) 02/15/2023    BUN 38 (H) 02/15/2023    CREATININE 0 45 (L) 02/15/2023    CALCIUM 8 2 (L) 02/15/2023    AST 32 02/15/2023    ALT 24 02/15/2023    ALKPHOS 49 02/15/2023    EGFR 118 02/15/2023   , BMP:  Lab Results   Component Value Date    SODIUM 130 (L) 02/15/2023    K 3 5 02/15/2023    CL 88 (L) 02/15/2023    CO2 40 (H) 02/15/2023    BUN 38 (H) 02/15/2023    CREATININE 0 45 (L) 02/15/2023    GLUC 276 (H) 02/15/2023    CALCIUM 8 2 (L) 02/15/2023    AGAP 2 (L) 02/15/2023    EGFR 118 02/15/2023       Counseling / Coordination of Care    Total floor / unit time spent today 60+ minutes  Greater than 50% of total time was spent with the patient and / or family counseling and / or coordination of care   A description of the counseling / coordination of care: goals of care, support, code status determination

## 2023-02-15 NOTE — PROGRESS NOTES
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%), heart failure with reduced ejection fraction (EF 40%), pulmonary nodules, protein-calorie malnutrition, hyperlipidemia, gastroesophageal reflux, impaired fasting glucose, osteoarthritis, osteoporosis, vitamin D deficiency, chronic hypoxemic and hypercapnic respiratory failure, benign prostate hyperplasia, prostate cancer, and former tobacco (105 PYH; quit 2012) abuse presents to [facility] due to shortness of breath       History obtained from ED documentation, chart review, and patient's spouse due to acute clinical situation  Per above the patient's symptoms have been starting over the past 1-2 days  He had been more drowsy than his baseline per wife  Falling asleep in his wheelchair  He was complaining of shortness of breath  The day prior to arrival he was hallucinating stating "something about a blue light" overnight he was in severe respiratory distress and was unable to speak  Wife became concerned and called 911  EMS arrival found the patient in severe respiratory distress and he was subsequently intubated in the field  Wife denies recent sick contacts, fever at home, no coughing, no sputum production  Wheezing at home  Short of breath       History obtained from spouse, chart review and unobtainable from patient due to respiratory failure and intubation/sedation     ---------------------------------------------------------------------------------------  SUBJECTIVE  Patient seen and examined  No acute overnight events  Remains sedated but alert but calm   Following simple commands, able to open eyes, moves extremities on command  Review of Systems  Review of systems was unable to be performed secondary to intubated and sedated  ---------------------------------------------------------------------------------------  Assessment and Plan:    #Endstage COPD, FEV1 22%, in acute exacerbation  #Acute on chronic HFrEF 40%  #Acute encephalopathy  #Hyponatremia  #Macrocytic anemia  #Pulmonary nodules    Plan:     Neuro:   · RASS goal 0 to -1  CPOT>2  · Minimally interacting on sedation  · Propofol held 2/14  · On precedex   5 for sedation  · Will reassess mental status, and plan for possible SBT today     CV:   · Diagnosis: HFrEF  · EF 40% PTA on lasix 20mg qd at home  · EF 20-25% on TTE 2/12 with abnormal septal motion also suggesting LBBB, RVSP 38, mildly dilated RV  · Hypotensive initially on admission following induction and loss of sympathetic drive  · Shock likely secondary to distributive etiology in s/o possible PNA; SvO2 76, unlikely cardiogenic  · S/P one-time dose 40 mg IV lasix on 2/12  · Briefly on epinephrine initially, now weaned off  · Remains on levo 1mcg at 3 8cc/hr augmented with vasopressin  04, with precedex   5 for sedation  · MAP goal >65  · Plan to wean off levo today  · S/P IV lasix 20mg 2/14, plan to transition to home dosed IV lasix 10mg today  · Continue with ASCVD prophylaxis with ASA and statin     Pulm:  · Diagnosis: Acute on chronic hypoxic respiratory failure   · Diagnosis: End stage COPD (FEV1 22%), in acute exacerbation  · 3L O2 at home  · Continue aggressive bronchodilators-- scheduled Xopenx/Atrovent QID, perforomist/pulmicort BID  · Continue Solu-medrol 40 mg TID  · Remains on ventilatory support  Repeat ABG showing chronic hypercapnia but improved pH     · Ventilator settings: AC/VC, seat at 20, patient rate 20, 350, 6PEEP, 40% FiO2, with goal sats 88%-92%  · Will reassess mental status, and plan for SBT today  · Goals of care discussions with wife ongoing- wife states she is unsure of patient's wishes re code status; previously, patient's outpatient pulmonologist discussed hospice care with patient  · Palliative care following, appreciate recommendations; patient deemed not competent at this time      GI:   · Known hx of GERD  · Continue stress ulcer prophylaxis in setting of concern for prolonged intubation  · Continue TF to goal     :   · No active issues  · Continue tyler placement for accurate measurements of I&Os while attempting diuresis        F/E/N:   · Diagnosis: Hyponatremia  · Na <135, serum osm low at 276, patient hypervolemic on exam, urine sodium low <5, patient hypervolemic on exam, concerning hypervolemic hyponatremia  · Etiology likely HF exacerbation vs hypoalbuminemia   · S/P albumin 5% 25g yesterday, which improved sodium to 132 this AM, vs  129 on prior  · S/P IV lasix 20mg for diuresis, plan to transition to home dosed diuretics todauy  · Continue tube feeding to goal  · Monitor electrolytes; goal K>4, Phos>3, Mag>2  · Replete calcium with calcium gluconate 2g   · Avoid additional IVF d/t decompensated heart failrue       Heme/Onc:   · Diagnosis: Pulmonary nodules  · Stable on imaging  · Serial monitoring outpatient  · Diagnosis: Documented history of prostate cancer  · Diagnosis: Anemia  · Hgb continues downward trend, now at 7 2 vs 8 1 from prior   No overt signs of GI bleed  · Iron panel suggesting iron deficiency and anemia of chronic disease  · Retic count pending  · Heme occult pending  · Continue DVT ppx        Endo:   · Fasting BS remains elevated 276  · Likely in setting of steroid treatment  · A1c 5 4  · On SSI for improved BS control  · Advance SSI algorithm for Goal 140-180       ID:   · Meets SIRS criteria on admission, procalcitonin elevated on admission  · S/P 3-day course of azithromycin   · Continue ceftriaxone  · Trend CBC and fever curve     MSK/Skin:   · Pressure ulcer ppx  · UOOB and early mobilization        Patient appropriate for transfer out of the ICU today?: No  Disposition: Admit to Critical Care   Code Status: Level 1 - Full Code  ---------------------------------------------------------------------------------------  ICU CORE MEASURES    Prophylaxis   VTE Pharmacologic Prophylaxis: Enoxaparin (Lovenox)  VTE Mechanical Prophylaxis: sequential compression device  Stress Ulcer Prophylaxis: Omeprazole PO    ABCDE Protocol (if indicated)  Plan to perform spontaneous awakening trial today? No  Plan to perform spontaneous breathing trial today? No  Obvious barriers to extubation? Yes  CAM-ICU: Positive    Invasive Devices Review  Invasive Devices     Central Venous Catheter Line  Duration           CVC Central Lines 23 Triple 16cm 2 days          Peripheral Intravenous Line  Duration           Peripheral IV 23 Distal;Right;Ventral (anterior) Forearm 3 days    Peripheral IV 23 Left Antecubital 3 days    Peripheral IV 23 Left Forearm 3 days          Arterial Line  Duration           Arterial Line 23 Left Radial 3 days          Drain  Duration           NG/OG/Enteral Tube Orogastric 18 Fr Center mouth 3 days    Urethral Catheter 2 days          Airway  Duration           ETT  Oral 7 mm 3 days              Can any invasive devices be discontinued today?  No  ---------------------------------------------------------------------------------------  OBJECTIVE    Vitals   Vitals:    02/15/23 0400 02/15/23 0500 02/15/23 0532 02/15/23 0600   BP: 99/65 98/66  94/62   Pulse: 82 74  72   Resp:   17   Temp: 99 7 °F (37 6 °C) 99 7 °F (37 6 °C)  99 7 °F (37 6 °C)   TempSrc: Bladder      SpO2: 99% 99%  100%   Weight:   59 8 kg (131 lb 13 4 oz)    Height:         Temp (24hrs), Av 8 °F (37 1 °C), Min:97 5 °F (36 4 °C), Max:100 °F (37 8 °C)  Current: Temperature: 99 7 °F (37 6 °C)  HR: 86  BP: 113/67  RR: 23  SpO2: 98%    Respiratory:  SpO2 Device: O2 Device: Ventilator       Invasive/non-invasive ventilation settings Respiratory    Lab Data (Last 4 hours)    None         O2/Vent Data (Last 4 hours)    None                Physical Exam  Constitutional:       Appearance: He is ill-appearing  Comments: Intubated and sedated  Cachectic    HENT:      Head: Normocephalic and atraumatic  Mouth/Throat:      Mouth: Mucous membranes are moist    Cardiovascular:      Heart sounds: Normal heart sounds  Pulmonary:      Breath sounds: Normal breath sounds  No wheezing or rhonchi  Abdominal:      General: Bowel sounds are normal  There is no distension  Palpations: Abdomen is soft  Musculoskeletal:      Right lower leg: No edema  Left lower leg: No edema  Skin:     General: Skin is warm            Laboratory and Diagnostics:  Results from last 7 days   Lab Units 02/15/23  0507 02/14/23  0517 02/13/23  0429 02/12/23  0759 02/12/23  0529   WBC Thousand/uL 9 97 7 51 10 91*  --  7 24   HEMOGLOBIN g/dL 7 2* 7 4* 8 1*  --  8 9*   HEMATOCRIT % 22 5* 23 0* 25 7*  --  28 5*   PLATELETS Thousands/uL 179 179 208 220 204   NEUTROS PCT % 89* 91* 87*  --  73   MONOS PCT % 9 6 10  --  17*     Results from last 7 days   Lab Units 02/15/23  0507 02/14/23  0517 02/13/23  0429 02/12/23  0529   SODIUM mmol/L 130* 132* 129* 129*   POTASSIUM mmol/L 3 5 3 7 3 5 4 0   CHLORIDE mmol/L 88* 88* 85* 86*   CO2 mmol/L 40* 38* 38* 39*   ANION GAP mmol/L 2* 6 6 4   BUN mg/dL 38* 31* 20 20   CREATININE mg/dL 0 45* 0 51* 0 51* 0 56*   CALCIUM mg/dL 8 2* 8 2* 8 2* 8 2*   GLUCOSE RANDOM mg/dL 276* 204* 139 114   ALT U/L 24 23 22 24   AST U/L 32 38 44 39   ALK PHOS U/L 49 44* 53 64   ALBUMIN g/dL 3 0* 3 2* 2 9* 2 9*   TOTAL BILIRUBIN mg/dL 0 48 0 53 0 63 0 51     Results from last 7 days   Lab Units 02/15/23  0507 02/14/23  0517 02/13/23  0429   MAGNESIUM mg/dL 2 2 2 2 2 0   PHOSPHORUS mg/dL 2 5 3 7 3 1      Results from last 7 days   Lab Units 02/12/23  0529   INR  0 93   PTT seconds 27          Results from last 7 days   Lab Units 02/12/23  0529 LACTIC ACID mmol/L 1 2     ABG:  Results from last 7 days   Lab Units 02/13/23  1304   PH ART  7 471*   PCO2 ART mm Hg 57 0*   PO2 ART mm Hg 100 0   HCO3 ART mmol/L 40 6*   BASE EXC ART mmol/L 15 1   ABG SOURCE  Line, Arterial     VBG:  Results from last 7 days   Lab Units 02/13/23  1304 02/13/23  1204   PH JHONATHAN   --  7 458*   PCO2 JHONATHAN mm Hg  --  57 5*   PO2 JHONATHAN mm Hg  --  39 7   HCO3 JHONATHAN mmol/L  --  39 8*   BASE EXC JHONATHAN mmol/L  --  14 1   ABG SOURCE  Line, Arterial  --      Results from last 7 days   Lab Units 02/13/23  0429 02/12/23  0529   PROCALCITONIN ng/ml 0 87* 0 05       Micro  Results from last 7 days   Lab Units 02/13/23  0859 02/12/23  0834 02/12/23  0803 02/12/23  0529   BLOOD CULTURE   --   --   --  No Growth at 48 hrs  No Growth at 48 hrs  SPUTUM CULTURE   --  3+ Growth of  --   --    GRAM STAIN RESULT   --  2+ Gram positive cocci in pairs*  2+ Gram negative rods*  Rare Polys*  --   --    URINE CULTURE  <10,000 cfu/ml  --   --   --    LEGIONELLA URINARY ANTIGEN   --   --  Negative  --    STREP PNEUMONIAE ANTIGEN, URINE   --   --  Negative  --        EKG: NSR, PACs, PVCs rate 84  Imaging:   XR chest portable ICU   Final Result by Billey Cowden, MD (02/13 1223)   Tubes and lines as above without pneumothorax  Recommend advancement of nasogastric tube  No acute cardiopulmonary disease  The study was marked in [facility] for immediate notification  Workstation performed: [id number]         XR chest portable   Final Result by Star Yo MD (24/31 5372)      1  Persistent high positioning of the endotracheal tube  Advancement by at least 4 cm advised  2   Tip of right internal jugular central venous catheter projects over the lower SVC  No pneumothorax  3   Persistent pulmonary vascular congestion  The study was marked in [facility] for immediate notification              Workstation performed: [id number]         CT head wo contrast   Final Result by Katelyn Washburn MD (02/12 1241)      No acute intracranial abnormality  Workstation performed: [id number]         XR chest 1 view portable   ED Interpretation by Cathi Jamison MD (02/12 9601)   Increased cephalization on LEFT > RIGHT  ITubated       Final Result by Mariela Maria MD (02/12 0720)      Moderate pulmonary venous congestion  ET tube 6 cm above the ghada  Workstation performed: [id number]            I have personally reviewed pertinent reports  Intake and Output  I/O       02/11 0701 02/12 0700 02/12 0701 02/13 0700 02/13 0701 02/14 0700    I V  (mL/kg)  727 6 (13 4)     IV Piggyback  475     Total Intake(mL/kg)  1202 6 (22 2)     Urine (mL/kg/hr)  2135 (1 6)     Emesis/NG output  50     Stool  0     Total Output  2185     Net  -982  4            Unmeasured Stool Occurrence  0 x         UOP: 2135 ml/hr     Height and Weights   Height: 5' (152 4 cm)  IBW (Ideal Body Weight): 50 kg  Body mass index is 25 75 kg/m²  Weight (last 2 days)     Date/Time Weight    02/15/23 0532 59 8 (131 84)    02/14/23 0600 56 4 (124 34)    02/13/23 0542 54 2 (119 49)            Nutrition       Diet Orders   (From admission, onward)             Start     Ordered    02/13/23 1507  Diet Enteral/Parenteral; Tube Feeding No Oral Diet; Vital AF 1 2; Cyclic; 50; 22 hours; 264; Water; Every 6 hours  Diet effective now        Comments: Advance by 10mL/hr every 4 hours to goal  Hold TF for 1 hour before and 1 hour after omeprazole administration to avoid drug-nutrient interaction  References:    Nutrtion Support Algorithm Enteral vs  Parenteral   Question Answer Comment   Diet Type Enteral/Parenteral    Enteral/Parenteral Tube Feeding No Oral Diet    Tube Feeding Formula: Vital AF 1 2    Bolus/Cyclic/Continuous Cyclic    Tube Feeding Cyclic Rate (mL/hr): 50    Tube Feeding Cyclic: Administer over: 22 hours    Tube Feeding flush (mL): 100    Water Flush type:  Water    Water flush frequency: Every 6 hours    RD to adjust diet per protocol?  Yes        02/13/23 1508                  Active Medications  Scheduled Meds:  Current Facility-Administered Medications   Medication Dose Route Frequency Provider Last Rate   • aspirin  81 mg Oral Daily Tammy Grow, DO     • budesonide  0 5 mg Nebulization Q12H Tammy Grow, DO     • cefTRIAXone  1,000 mg Intravenous Q24H Brinda Fish MD 1,000 mg (02/15/23 3108)   • chlorhexidine  15 mL Mouth/Throat Q12H [facility] Tammy Grow, DO     • dexmedetomidine  0 1-0 7 mcg/kg/hr Intravenous Titrated Lyndon Bunting, DO 0 5 mcg/kg/hr (02/15/23 1833)   • enoxaparin  40 mg Subcutaneous Daily Tammy Grow, DO     • ergocalciferol  50,000 Units Oral Q28 Days Tammy Grow, DO     • fentanyl citrate (PF)  50 mcg Intravenous Q1H PRN Tammy Grow, DO     • fentanyl citrate (PF)  50 mcg Intravenous Once Brinda Fish MD     • formoterol  20 mcg Nebulization Q12H Tammy Grow, DO     • insulin lispro  1-5 Units Subcutaneous Q6H GABE Lyndon Simon, DO     • levalbuterol  1 25 mg Nebulization TID Brinda Fish MD      And   • ipratropium  0 5 mg Nebulization TID Brinda Fish MD     • methylPREDNISolone sodium succinate  40 mg Intravenous [facility] Tammy Grow, DO     • norepinephrine  1-30 mcg/min Intravenous Titrated Tammy Grow, DO 1 mcg/min (02/14/23 1657)   • omeprazole (PRILOSEC) suspension 2 mg/mL  20 mg Oral Daily Tammy Grow, DO     • polyethylene glycol  17 g Oral Daily PRN Scott Lopez DO     • pravastatin  80 mg Oral Daily With Dinner Tammy Grow, DO     • propofol  5-50 mcg/kg/min Intravenous Titrated Wan German MD Stopped (02/14/23 1158)   • senna-docusate sodium  1 tablet Oral HS Scott Lopez DO     • vasopressin  0 04 Units/min Intravenous Continuous Kamron P Ye, DO 0 04 Units/min (02/15/23 2345)     Continuous Infusions:  dexmedetomidine, 0 1-0 7 mcg/kg/hr, Last Rate: 0 5 mcg/kg/hr (02/15/23 7573)  norepinephrine, 1-30 mcg/min, Last Rate: 1 mcg/min (02/14/23 0497)  propofol, 5-50 mcg/kg/min, Last Rate: Stopped (02/14/23 1158)  vasopressin, 0 04 Units/min, Last Rate: 0 04 Units/min (02/15/23 8905)      PRN Meds:   fentanyl citrate (PF), 50 mcg, Q1H PRN  polyethylene glycol, 17 g, Daily PRN        Allergies   No Known Allergies  ---------------------------------------------------------------------------------------  Advance Directive and Living Will:      Power of :    POLST:    ---------------------------------------------------------------------------------------  Care Time Delivered:   Upon my evaluation, this patient had a high probability of imminent or life-threatening deterioration due to COPD exacerbation requirng mechanical ventillation, which required my direct attention, intervention, and personal management  I have personally provided 30 minute of critical care time, exclusive of procedures, teaching, family meetings, and any prior time recorded by providers other than myself  Tam Jones DO      Portions of the record may have been created with voice recognition software  Occasional wrong word or "sound a like" substitutions may have occurred due to the inherent limitations of voice recognition software    Read the chart carefully and recognize, using context, where substitutions have occurred

## 2023-02-16 LAB
ALBUMIN SERPL BCP-MCNC: 3.1 G/DL (ref 3.5–5)
ALP SERPL-CCNC: 44 U/L (ref 46–116)
ALT SERPL W P-5'-P-CCNC: 29 U/L (ref 12–78)
ANION GAP SERPL CALCULATED.3IONS-SCNC: 1 MMOL/L (ref 4–13)
AST SERPL W P-5'-P-CCNC: 35 U/L (ref 5–45)
BASE EXCESS BLDA CALC-SCNC: 12.8 MMOL/L
BASOPHILS # BLD AUTO: 0 THOUSANDS/ÂΜL (ref 0–0.1)
BASOPHILS NFR BLD AUTO: 0 % (ref 0–1)
BILIRUB SERPL-MCNC: 0.59 MG/DL (ref 0.2–1)
BUN SERPL-MCNC: 30 MG/DL (ref 5–25)
CA-I BLD-SCNC: 1.09 MMOL/L (ref 1.12–1.32)
CALCIUM ALBUM COR SERPL-MCNC: 9.4 MG/DL (ref 8.3–10.1)
CALCIUM SERPL-MCNC: 8.7 MG/DL (ref 8.3–10.1)
CHLORIDE SERPL-SCNC: 92 MMOL/L (ref 96–108)
CO2 SERPL-SCNC: 42 MMOL/L (ref 21–32)
CREAT SERPL-MCNC: 0.37 MG/DL (ref 0.6–1.3)
EOSINOPHIL # BLD AUTO: 0 THOUSAND/ÂΜL (ref 0–0.61)
EOSINOPHIL NFR BLD AUTO: 0 % (ref 0–6)
ERYTHROCYTE [DISTWIDTH] IN BLOOD BY AUTOMATED COUNT: 13 % (ref 11.6–15.1)
GFR SERPL CREATININE-BSD FRML MDRD: 128 ML/MIN/1.73SQ M
GLUCOSE SERPL-MCNC: 136 MG/DL (ref 65–140)
GLUCOSE SERPL-MCNC: 141 MG/DL (ref 65–140)
GLUCOSE SERPL-MCNC: 171 MG/DL (ref 65–140)
GLUCOSE SERPL-MCNC: 185 MG/DL (ref 65–140)
HCO3 BLDA-SCNC: 39.4 MMOL/L (ref 22–28)
HCT VFR BLD AUTO: 23.5 % (ref 36.5–49.3)
HGB BLD-MCNC: 7.3 G/DL (ref 12–17)
IMM GRANULOCYTES # BLD AUTO: 0.06 THOUSAND/UL (ref 0–0.2)
IMM GRANULOCYTES NFR BLD AUTO: 1 % (ref 0–2)
IPAP: 12
LYMPHOCYTES # BLD AUTO: 0.17 THOUSANDS/ÂΜL (ref 0.6–4.47)
LYMPHOCYTES NFR BLD AUTO: 2 % (ref 14–44)
MAGNESIUM SERPL-MCNC: 2.2 MG/DL (ref 1.6–2.6)
MCH RBC QN AUTO: 31.7 PG (ref 26.8–34.3)
MCHC RBC AUTO-ENTMCNC: 31.1 G/DL (ref 31.4–37.4)
MCV RBC AUTO: 102 FL (ref 82–98)
MONOCYTES # BLD AUTO: 0.65 THOUSAND/ÂΜL (ref 0.17–1.22)
MONOCYTES NFR BLD AUTO: 6 % (ref 4–12)
NEUTROPHILS # BLD AUTO: 9.68 THOUSANDS/ÂΜL (ref 1.85–7.62)
NEUTS SEG NFR BLD AUTO: 91 % (ref 43–75)
NON VENT- BIPAP: ABNORMAL
NRBC BLD AUTO-RTO: 0 /100 WBCS
O2 CT BLDA-SCNC: 11.7 ML/DL (ref 16–23)
OXYHGB MFR BLDA: 97.7 % (ref 94–97)
PCO2 BLDA: 65.8 MM HG (ref 36–44)
PEEP MAX SETTING VENT: 6 CM[H2O]
PH BLDA: 7.39 [PH] (ref 7.35–7.45)
PHOSPHATE SERPL-MCNC: 2.8 MG/DL (ref 2.3–4.1)
PLATELET # BLD AUTO: 185 THOUSANDS/UL (ref 149–390)
PMV BLD AUTO: 10.1 FL (ref 8.9–12.7)
PO2 BLDA: 136 MM HG (ref 75–129)
POTASSIUM SERPL-SCNC: 3.8 MMOL/L (ref 3.5–5.3)
PROT SERPL-MCNC: 5.4 G/DL (ref 6.4–8.4)
RBC # BLD AUTO: 2.3 MILLION/UL (ref 3.88–5.62)
RETICS # AUTO: ABNORMAL 10*3/UL (ref 14356–105094)
RETICS # CALC: 2.64 % (ref 0.37–1.87)
SODIUM SERPL-SCNC: 135 MMOL/L (ref 135–147)
SPECIMEN SOURCE: ABNORMAL
VENT BIPAP FIO2: 40 %
WBC # BLD AUTO: 10.56 THOUSAND/UL (ref 4.31–10.16)

## 2023-02-16 RX ORDER — ESCITALOPRAM OXALATE 5 MG/1
5 TABLET ORAL DAILY
Status: DISCONTINUED | OUTPATIENT
Start: 2023-02-16 | End: 2023-02-28 | Stop reason: HOSPADM

## 2023-02-16 RX ORDER — ALBUTEROL SULFATE 2.5 MG/3ML
SOLUTION RESPIRATORY (INHALATION)
Status: COMPLETED
Start: 2023-02-16 | End: 2023-02-16

## 2023-02-16 RX ORDER — TAMSULOSIN HYDROCHLORIDE 0.4 MG/1
0.4 CAPSULE ORAL
Status: DISCONTINUED | OUTPATIENT
Start: 2023-02-16 | End: 2023-02-28 | Stop reason: HOSPADM

## 2023-02-16 RX ORDER — ALBUTEROL SULFATE 2.5 MG/3ML
2.5 SOLUTION RESPIRATORY (INHALATION) ONCE
Status: COMPLETED | OUTPATIENT
Start: 2023-02-16 | End: 2023-02-16

## 2023-02-16 RX ORDER — CALCIUM GLUCONATE 20 MG/ML
2 INJECTION, SOLUTION INTRAVENOUS ONCE
Status: COMPLETED | OUTPATIENT
Start: 2023-02-16 | End: 2023-02-16

## 2023-02-16 RX ADMIN — PRAVASTATIN SODIUM 80 MG: 20 TABLET ORAL at 16:08

## 2023-02-16 RX ADMIN — CALCIUM GLUCONATE 2 G: 20 INJECTION, SOLUTION INTRAVENOUS at 07:52

## 2023-02-16 RX ADMIN — ASPIRIN 81 MG CHEWABLE TABLET 81 MG: 81 TABLET CHEWABLE at 08:29

## 2023-02-16 RX ADMIN — FORMOTEROL FUMARATE DIHYDRATE 20 MCG: 20 SOLUTION RESPIRATORY (INHALATION) at 07:32

## 2023-02-16 RX ADMIN — LEVALBUTEROL HYDROCHLORIDE 1.25 MG: 1.25 SOLUTION, CONCENTRATE RESPIRATORY (INHALATION) at 13:38

## 2023-02-16 RX ADMIN — METHYLPREDNISOLONE SODIUM SUCCINATE 40 MG: 40 INJECTION, POWDER, FOR SOLUTION INTRAMUSCULAR; INTRAVENOUS at 16:08

## 2023-02-16 RX ADMIN — METHYLPREDNISOLONE SODIUM SUCCINATE 40 MG: 40 INJECTION, POWDER, FOR SOLUTION INTRAMUSCULAR; INTRAVENOUS at 22:18

## 2023-02-16 RX ADMIN — ALBUTEROL SULFATE 2.5 MG: 2.5 SOLUTION RESPIRATORY (INHALATION) at 08:44

## 2023-02-16 RX ADMIN — LEVALBUTEROL HYDROCHLORIDE 1.25 MG: 1.25 SOLUTION, CONCENTRATE RESPIRATORY (INHALATION) at 19:36

## 2023-02-16 RX ADMIN — ENOXAPARIN SODIUM 40 MG: 40 INJECTION SUBCUTANEOUS at 08:29

## 2023-02-16 RX ADMIN — FUROSEMIDE 20 MG: 10 INJECTION, SOLUTION INTRAMUSCULAR; INTRAVENOUS at 16:08

## 2023-02-16 RX ADMIN — FUROSEMIDE 20 MG: 10 INJECTION, SOLUTION INTRAMUSCULAR; INTRAVENOUS at 07:53

## 2023-02-16 RX ADMIN — ESCITALOPRAM OXALATE 5 MG: 5 TABLET, FILM COATED ORAL at 12:00

## 2023-02-16 RX ADMIN — ALBUTEROL SULFATE 7.5 MG: 2.5 SOLUTION RESPIRATORY (INHALATION) at 09:24

## 2023-02-16 RX ADMIN — ALBUTEROL SULFATE 7.5 MG: 2.5 SOLUTION RESPIRATORY (INHALATION) at 09:11

## 2023-02-16 RX ADMIN — BUDESONIDE 0.5 MG: 0.5 INHALANT ORAL at 19:36

## 2023-02-16 RX ADMIN — IPRATROPIUM BROMIDE 0.5 MG: 0.5 SOLUTION RESPIRATORY (INHALATION) at 07:30

## 2023-02-16 RX ADMIN — LEVALBUTEROL HYDROCHLORIDE 1.25 MG: 1.25 SOLUTION, CONCENTRATE RESPIRATORY (INHALATION) at 07:30

## 2023-02-16 RX ADMIN — TAMSULOSIN HYDROCHLORIDE 0.4 MG: 0.4 CAPSULE ORAL at 16:08

## 2023-02-16 RX ADMIN — IPRATROPIUM BROMIDE 0.5 MG: 0.5 SOLUTION RESPIRATORY (INHALATION) at 19:36

## 2023-02-16 RX ADMIN — Medication 20 MG: at 08:29

## 2023-02-16 RX ADMIN — CHLORHEXIDINE GLUCONATE 0.12% ORAL RINSE 15 ML: 1.2 LIQUID ORAL at 08:29

## 2023-02-16 RX ADMIN — METHYLPREDNISOLONE SODIUM SUCCINATE 40 MG: 40 INJECTION, POWDER, FOR SOLUTION INTRAMUSCULAR; INTRAVENOUS at 05:08

## 2023-02-16 RX ADMIN — FORMOTEROL FUMARATE DIHYDRATE 20 MCG: 20 SOLUTION RESPIRATORY (INHALATION) at 19:36

## 2023-02-16 RX ADMIN — IPRATROPIUM BROMIDE 0.5 MG: 0.5 SOLUTION RESPIRATORY (INHALATION) at 13:38

## 2023-02-16 RX ADMIN — INSULIN LISPRO 2 UNITS: 100 INJECTION, SOLUTION INTRAVENOUS; SUBCUTANEOUS at 12:20

## 2023-02-16 RX ADMIN — CEFTRIAXONE 1000 MG: 1 INJECTION, POWDER, FOR SOLUTION INTRAMUSCULAR; INTRAVENOUS at 07:08

## 2023-02-16 RX ADMIN — BUDESONIDE 0.5 MG: 0.5 INHALANT ORAL at 07:30

## 2023-02-16 RX ADMIN — CHLORHEXIDINE GLUCONATE 0.12% ORAL RINSE 15 ML: 1.2 LIQUID ORAL at 21:00

## 2023-02-16 RX ADMIN — INSULIN LISPRO 2 UNITS: 100 INJECTION, SOLUTION INTRAVENOUS; SUBCUTANEOUS at 18:21

## 2023-02-16 NOTE — PROGRESS NOTES
Pastoral Care Progress Note    2023  Patient: Chacorta Rai  : 1955  Admission Date & Time: 2023 0507  MRN: [id number] [facility]: [id number]         23 1000   Clinical Encounter Type   Visited With Patient and family together   Routine Visit Follow-up   Denominational Encounters   Denominational Needs Prayer   Patient Spiritual Encounters   Suffering Severity 2   Fear Level 2   Family Spiritual Encounters   Family Coping Fearful;Open/discussion; Sadness;Depression; Anxiety; Denial;Accepting  (Accepting yet still hoping "for the best")   Family Normalization 4   Family Participation in Care 5   Family Support During Treatment 5   Caregiver-Patient Relationship 5    followed-up with the pt and pt's family  When I entered the room the pt's wife Crystal Jaime and his niece Stoney Buchanan were in the room and at bedside  Jim was awake and alert and able to participate in our discussion  I introduced myself to the pt's niece and we talked about her drive in and her current feelings of exhaustion  She became emotional as she started to verbally process and recount her uncle's current situation  I also talked and provided emotional support for the pt's wife, Crystal Jaime  When I asked her how she was doing, she at first replied "I'm okay " Then Jim joined in the conversation and encouraged her to tell me "how she really is feeling " Crystal Jaime then became emotional and told me that even though she is trying her best to be accepting of her 's situation, that she is still not ready to "let him go " And that "he cant leave yet " I held her hand and let her process and speak her emotions aloud  We talked about her immense feelings of fear surrounding the situation and I affirmed her and validated her emotions  We talked more about how it is okay to be in the present moment during these difficult situation and hold hope - regardless of how that may look or how impossible it may seem   But in addition to this, it is also crucial to be aware of fear/anger/denial that may surface so that we can process these feelings and allow them to be released  Then we can appropriately allow a vulnerable and honest space to open up to place our "new version" of hope in  Delfina Woodard thanked me for my words and our conversation  She also told me that the resources I left for her the other day provided comfort for her  I then prayed with her and Janeth Marshall and Karena Reed, asking for patience, understanding, opal, courage, and transparency  I prayed for strength and courage and I thanked Janeth Marshall for being "a good fighter" (per his wife & niece) and his family for their continuous support  I asked if there were any other needs that I could fulfill at this time and none were expressed  I told Delfina Woodard that I would keep following-up  Chaplains still remain available                 Chaplaincy Interventions Utilized:   Empowerment: Encouraged focus on present, Encouraged self-care, Facilitated group experience, Normalized experience of patient/family, Provided anticipatory guidance, Provided anxiety containment, and Provided grief counseling    Exploration: Explored hope, Explored emotional needs & resources, Explored relational needs & resources, Explored spiritual needs & resources, Facilitated life review, Facilitated story telling, and Identified, evaluated & reinforced appropriate coping strategies    Collaboration: Advocated for patient/family    Relationship Building: Cultivated a relationship of care and support, Listened empathically, Hospitality, and Provided silent and supportive presence    Ritual: Provided prayer    Chaplaincy Outcomes Achieved:  Catharsis, Emotional resources utilized, Expressed gratitude, Expressed humor, Expressed intermediate hope, Expressed ultimate hope, Identified meaningful connections, Improved communication, Progressed toward adherence/compliance, Progressed toward balance of responsibility & trust , Progressed toward focus on present, Progressed toward meaning, Progressed toward purpose, Progressed toward understanding, Relational resources utilized, Spiritual resources utilized, Tearfully processed emotions, and Verbally processed emotions    Spiritual Coping Strategies Utilized:   Connectedness, Spiritual practices, Spiritual comfort, Spiritual empowerment, Spiritual gratitude, Spiritual meaning, Spiritual conflict, and Spiritual struggle

## 2023-02-16 NOTE — PROGRESS NOTES
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%), heart failure with reduced ejection fraction (EF 40%), pulmonary nodules, protein-calorie malnutrition, hyperlipidemia, gastroesophageal reflux, impaired fasting glucose, osteoarthritis, osteoporosis, vitamin D deficiency, chronic hypoxemic and hypercapnic respiratory failure, benign prostate hyperplasia, prostate cancer, and former tobacco (105 PYH; quit 2012) abuse presents to [facility] due to shortness of breath  History obtained from ED documentation, chart review, and patient's spouse due to acute clinical situation  Per above the patient's symptoms have been starting over the past 1-2 days  He had been more drowsy than his baseline per wife  Falling asleep in his wheelchair  He was complaining of shortness of breath  The day prior to arrival he was hallucinating stating "something about a blue light" overnight he was in severe respiratory distress and was unable to speak  Wife became concerned and called 911  EMS arrival found the patient in severe respiratory distress and he was subsequently intubated in the field  Wife denies recent sick contacts, fever at home, no coughing, no sputum production  Wheezing at home  Short of breath  24h events: Extubated 2/15  Now off vaso and levo  Off sedation  Tolerated home 3L O2 s/p intubation  Uses cPAP at home, hence placed on BiPAP overnight at 12/6      ---------------------------------------------------------------------------------------  SUBJECTIVE  Patient seen and examined  No acute overnight events   Endorses increasing SOB on 5L O2 NC after being off BiPAP this AM  Denies chest pain  No other concerns noted  Review of Systems  Review of systems was unable to be performed secondary to intubated and sedated  ---------------------------------------------------------------------------------------  Assessment and Plan:    #Endstage COPD, FEV1 22%, in acute exacerbation  #Acute on chronic HFrEF 40%  #Hyponatremia  #Anemia  #Pulmonary nodules    Plan:     Neuro:   · RASS 0; goal 0 to -1  CPOT>2  · No longer on sedation  · Successful extubation 2/15  · Delirium precautions, OOB to chair as able     CV:   · Diagnosis: HFrEF  · EF 40% PTA on lasix 20mg qd at home  · EF 20-25% on TTE 2/12 with abnormal septal motion also suggesting LBBB, RVSP 38, mildly dilated RV  · Hypotensive initially on admission following induction and loss of sympathetic drive  Initially on Epi, was weaned off and transitioned to levo and vasopressin   Shock likely secondary to distributive etiology in s/o possible PNA; SvO2 76, unlikely cardiogenic  · Off Levo and vasopressin 2/15  · S/P one-time dose 40 mg IV lasix on 2/12 --> IV lasix 20mg BID  · Plan to transition to home dosed PO lasix 20mg qd today  · MAP goal >65   · Continue with ASCVD prophylaxis with ASA and statin     Pulm:  · Diagnosis: Acute on chronic hypoxic respiratory failure   · Diagnosis: End stage COPD (FEV1 22%), in acute exacerbation  · 3L O2 at home  · Intubated 2/12, extubated 2/15  · Continue aggressive bronchodilators-- scheduled Xopenx/Atrovent QID, perforomist/pulmicort BID  · Continue Solu-medrol 40 mg TID  · Pulmonary hygiene- use of incentive spirometry, OOB to chair as able  · Palliative care following, appreciate recommendations  · Goals of care discussed with patient and wife at bedside, to remain level 3 DNR/DNI after the patient     GI:   · Known hx of GERD  · Continue stress ulcer prophylaxis in setting of concern for prolonged intubation     :   · No active issues  · Jaden phillips for accurate measurements of I&Os while attempting diuresis  · Plan for possible oley removal today        F/E/N:   · Diagnosis: Hyponatremia, improving  · Na <135 initially, serum osm low at 276, patient hypervolemic on exam, urine sodium low <5, patient hypervolemic on exam, concerning hypervolemic hyponatremia  · Etiology likely HF exacerbation vs hypoalbuminemia   · S/P albumin 5% 25g 2/14, which improved sodium to 132  · Suspect lack of improvement likely due to free water flushes from tube feeds  · Hold free water flushes at this time and continue diuresis for further improvement in serum sodium  · Off TF, can plan for oral diet, pending bedside swallow  · Monitor electrolytes; goal K>4, Phos>3, Mag>2  · Replete calcium with calcium gluconate 2g   · Avoid additional IVF d/t decompensated heart failrue       Heme/Onc:   · Diagnosis: Pulmonary nodules  · Stable on imaging  · Serial monitoring outpatient  · Diagnosis: Documented history of prostate cancer  · Diagnosis: Anemia  · Hgb continues downward trend, though stable in 7's  No overt signs of GI bleed  · Iron panel suggesting mixed iron deficiency vs  anemia of chronic disease  · Retic index   74 suggesting hypoproliferation  · Heme occult pending  · Continue DVT ppx        Endo:   · Fasting  this AM, at goal  · A1c 5 4  · On SSI for BS control  · BS Goal 140-180       ID:   · Meets SIRS criteria on admission, procalcitonin elevated on admission  · S/P 3-day course of azithromycin   · Continue ceftriaxone 1g, day 5  · Trend CBC and fever curve     MSK/Skin:   · Pressure ulcer ppx  · UOOB and early mobilization        Patient appropriate for transfer out of the ICU today?: No  Disposition: Admit to Critical Care   Code Status: Level 3 - DNAR and DNI  ---------------------------------------------------------------------------------------  ICU CORE MEASURES    Prophylaxis   VTE Pharmacologic Prophylaxis: Enoxaparin (Lovenox)  VTE Mechanical Prophylaxis: sequential compression device  Stress Ulcer Prophylaxis: Omeprazole PO    ABCDE Protocol (if indicated)  Plan to perform spontaneous awakening trial today? No  Plan to perform spontaneous breathing trial today? No  Obvious barriers to extubation? Yes  CAM-ICU: Positive    Invasive Devices Review  Invasive Devices     Central Venous Catheter Line  Duration           CVC Central Lines 23 Triple 16cm 3 days          Peripheral Intravenous Line  Duration           Peripheral IV 23 Distal;Right;Ventral (anterior) Forearm 4 days    Peripheral IV 23 Left Antecubital 4 days    Peripheral IV 23 Left Forearm 4 days          Arterial Line  Duration           Arterial Line 23 Left Radial 4 days          Drain  Duration           Urethral Catheter 3 days              Can any invasive devices be discontinued today? No  ---------------------------------------------------------------------------------------  OBJECTIVE    Vitals   Vitals:    23 0601 23 0700 23 0702 23 0730   BP:   96/61    Pulse: 90 86 90    Resp: 20 (!) 23 20    Temp: 99 °F (37 2 °C) 99 3 °F (37 4 °C) 99 3 °F (37 4 °C)    TempSrc:       SpO2: 99% 99% 99% 99%   Weight:       Height:         Temp (24hrs), Av 8 °F (37 1 °C), Min:98 2 °F (36 8 °C), Max:99 3 °F (37 4 °C)  Current: Temperature: 99 3 °F (37 4 °C)  HR: 86  BP: 113/67  RR: 23  SpO2: 98%    Respiratory:  SpO2 Device: O2 Device: BiPAP  Nasal Cannula O2 Flow Rate (L/min): 4 L/min    Invasive/non-invasive ventilation settings   Respiratory    Lab Data (Last 4 hours)    None         O2/Vent Data (Last 4 hours)    None                Physical Exam  Constitutional:       Appearance: He is ill-appearing  Comments: Intubated and sedated  Cachectic    HENT:      Head: Normocephalic and atraumatic  Mouth/Throat:      Mouth: Mucous membranes are moist    Cardiovascular:      Heart sounds: Normal heart sounds  Pulmonary:      Breath sounds: Wheezing (diffuze, L>R) present  No rhonchi  Abdominal:      General: Bowel sounds are normal  There is no distension  Palpations: Abdomen is soft  Musculoskeletal:      Right lower leg: No edema  Left lower leg: No edema  Skin:     General: Skin is warm            Laboratory and Diagnostics:  Results from last 7 days   Lab Units 02/16/23  0447 02/15/23  0507 02/14/23  0517 02/13/23  0429 02/12/23  0759 02/12/23  0529   WBC Thousand/uL 10 56* 9 97 7 51 10 91*  --  7 24   HEMOGLOBIN g/dL 7 3* 7 2* 7 4* 8 1*  --  8 9*   HEMATOCRIT % 23 5* 22 5* 23 0* 25 7*  --  28 5*   PLATELETS Thousands/uL 185 179 179 208 220 204   NEUTROS PCT %  --  89* 91* 87*  --  73   MONOS PCT %  --  9 6 10  --  17*     Results from last 7 days   Lab Units 02/16/23  0447 02/15/23  0507 02/14/23  0517 02/13/23  0429 02/12/23  0529   SODIUM mmol/L 135 130* 132* 129* 129*   POTASSIUM mmol/L 3 8 3 5 3 7 3 5 4 0   CHLORIDE mmol/L 92* 88* 88* 85* 86*   CO2 mmol/L 42* 40* 38* 38* 39*   ANION GAP mmol/L 1* 2* 6 6 4   BUN mg/dL 30* 38* 31* 20 20   CREATININE mg/dL 0 37* 0 45* 0 51* 0 51* 0 56*   CALCIUM mg/dL 8 7 8 2* 8 2* 8 2* 8 2*   GLUCOSE RANDOM mg/dL 141* 276* 204* 139 114   ALT U/L 29 24 23 22 24   AST U/L 35 32 38 44 39   ALK PHOS U/L 44* 49 44* 53 64   ALBUMIN g/dL 3 1* 3 0* 3 2* 2 9* 2 9*   TOTAL BILIRUBIN mg/dL 0 59 0 48 0 53 0 63 0 51     Results from last 7 days   Lab Units 02/16/23  0447 02/15/23  0507 02/14/23  0517 02/13/23  0429   MAGNESIUM mg/dL 2 2 2 2 2 2 2 0   PHOSPHORUS mg/dL 2 8 2 5 3 7 3 1      Results from last 7 days   Lab Units 02/12/23  0529   INR  0 93   PTT seconds 27          Results from last 7 days   Lab Units 02/12/23  0529   LACTIC ACID mmol/L 1 2     ABG:  Results from last 7 days   Lab Units 02/13/23  1304   PH ART  7 471*   PCO2 ART mm Hg 57 0*   PO2 ART mm Hg 100 0   HCO3 ART mmol/L 40 6*   BASE EXC ART mmol/L 15 1   ABG SOURCE  Line, Arterial     VBG:  Results from last 7 days   Lab Units 02/13/23  1304 02/13/23  1204   PH JHONATHAN   -- 7 458*   PCO2 JHONATHAN mm Hg  --  57 5*   PO2 JHONATHAN mm Hg  --  39 7   HCO3 JHONATHAN mmol/L  --  39 8*   BASE EXC JHONATHAN mmol/L  --  14 1   ABG SOURCE  Line, Arterial  --      Results from last 7 days   Lab Units 02/13/23  0429 02/12/23  0529   PROCALCITONIN ng/ml 0 87* 0 05       Micro  Results from last 7 days   Lab Units 02/13/23  0859 02/12/23  0834 02/12/23  0803 02/12/23  0529   BLOOD CULTURE   --   --   --  No Growth at 72 hrs  No Growth at 72 hrs  SPUTUM CULTURE   --  3+ Growth of  --   --    GRAM STAIN RESULT   --  2+ Gram positive cocci in pairs*  2+ Gram negative rods*  Rare Polys*  --   --    URINE CULTURE  <10,000 cfu/ml  --   --   --    LEGIONELLA URINARY ANTIGEN   --   --  Negative  --    STREP PNEUMONIAE ANTIGEN, URINE   --   --  Negative  --        EKG: NSR, PACs, PVCs rate 84  Imaging:   XR chest portable ICU   Final Result by Sangeeta Thomson MD (02/15 1446)      Endotracheal tube projects 2-3 cm above the ghada  Stable moderate pulmonary vascular congestion  Workstation performed: [id number]         XR chest portable ICU   Final Result by Armadn Banks MD (02/13 1223)   Tubes and lines as above without pneumothorax  Recommend advancement of nasogastric tube  No acute cardiopulmonary disease  The study was marked in [facility] for immediate notification  Workstation performed: [id number]         XR chest portable   Final Result by Elizabeth Krause MD (25/32 7920)      1  Persistent high positioning of the endotracheal tube  Advancement by at least 4 cm advised  2   Tip of right internal jugular central venous catheter projects over the lower SVC  No pneumothorax  3   Persistent pulmonary vascular congestion  The study was marked in [facility] for immediate notification  Workstation performed: [id number]         CT head wo contrast   Final Result by Adriel Thomas MD (02/12 1241)      No acute intracranial abnormality                    Workstation performed: [id number]         XR chest 1 view portable   ED Interpretation by Nisreen Stoner MD (02/12 7468)   Increased cephalization on LEFT > RIGHT  ITubated       Final Result by Milton Trujillo MD (02/12 0720)      Moderate pulmonary venous congestion  ET tube 6 cm above the ghada  Workstation performed: [id number]            I have personally reviewed pertinent reports  Intake and Output  I/O       02/11 0701 02/12 0700 02/12 0701 02/13 0700 02/13 0701 02/14 0700    I V  (mL/kg)  727 6 (13 4)     IV Piggyback  475     Total Intake(mL/kg)  1202 6 (22 2)     Urine (mL/kg/hr)  2135 (1 6)     Emesis/NG output  50     Stool  0     Total Output  2185     Net  -982  4            Unmeasured Stool Occurrence  0 x         UOP: 2135 ml/hr     Height and Weights   Height: 5' (152 4 cm)  IBW (Ideal Body Weight): 50 kg  Body mass index is 25 75 kg/m²  Weight (last 2 days)     Date/Time Weight    02/15/23 0532 59 8 (131 84)    02/14/23 0600 56 4 (124 34)            Nutrition       Diet Orders   (From admission, onward)             Start     Ordered    02/15/23 1000  Diet Enteral/Parenteral; Tube Feeding No Oral Diet; Vital AF 1 2; Cyclic; 50; 22 hours  Diet effective now        Comments: Advance by 10mL/hr every 4 hours to goal  Hold TF for 1 hour before and 1 hour after omeprazole administration to avoid drug-nutrient interaction  References:    Nutrtion Support Algorithm Enteral vs  Parenteral   Question Answer Comment   Diet Type Enteral/Parenteral    Enteral/Parenteral Tube Feeding No Oral Diet    Tube Feeding Formula: Vital AF 1 2    Bolus/Cyclic/Continuous Cyclic    Tube Feeding Cyclic Rate (mL/hr): 50    Tube Feeding Cyclic: Administer over: 22 hours    RD to adjust diet per protocol?  Yes        02/15/23 1002                  Active Medications  Scheduled Meds:  Current Facility-Administered Medications   Medication Dose Route Frequency Provider Last Rate   • aspirin  81 mg Oral Daily Kathryn Lowe, DO     • budesonide  0 5 mg Nebulization Q12H Kathryn Lowe, DO     • calcium gluconate  2 g Intravenous Once Justine Montoya DO 2 g (02/16/23 0752)   • cefTRIAXone  1,000 mg Intravenous Q24H Rickey Vaz MD 1,000 mg (02/16/23 0708)   • chlorhexidine  15 mL Mouth/Throat Q12H [address] Kathryn Lowe, DO     • dexmedetomidine  0 1-0 7 mcg/kg/hr Intravenous Titrated Aubree Jassi, DO Stopped (02/15/23 1633)   • enoxaparin  40 mg Subcutaneous Daily Kathryn Lowe, DO     • ergocalciferol  50,000 Units Oral Q28 Days Kathryn Lowe, DO     • fentanyl citrate (PF)  50 mcg Intravenous Q1H PRN Kathryn Lowe, DO     • fentanyl citrate (PF)  50 mcg Intravenous Once Rickey Vaz MD     • ferrous sulfate  325 mg Oral Every Other Day Alondra Alamo MD     • formoterol  20 mcg Nebulization Q12H Kathryn Lowe, DO     • furosemide  20 mg Intravenous BID (diuretic) Alondra Alamo MD     • insulin lispro  2-12 Units Subcutaneous Q6H [address] Alondra Alamo MD     • levalbuterol  1 25 mg Nebulization TID Rickey Vaz MD      And   • ipratropium  0 5 mg Nebulization TID Rickey Vaz MD     • methylPREDNISolone sodium succinate  40 mg Intravenous [facility] Kathryn Lowe, DO     • norepinephrine  1-30 mcg/min Intravenous Titrated Kathryn Lowe, DO Stopped (02/15/23 1633)   • omeprazole (PRILOSEC) suspension 2 mg/mL  20 mg Oral Daily Kathryn Lowe, DO     • polyethylene glycol  17 g Oral Daily PRN Daril Negus, DO     • pravastatin  80 mg Oral Daily With Dinner Kathryn Lowe, DO     • propofol  5-50 mcg/kg/min Intravenous Titrated Mery Campos MD Stopped (02/14/23 1158)   • senna-docusate sodium  1 tablet Oral HS Daril Negus, DO     • vasopressin  0 04 Units/min Intravenous Continuous Aubree Jassi, DO Stopped (02/15/23 2220)     Continuous Infusions:  dexmedetomidine, 0 1-0 7 mcg/kg/hr, Last Rate: Stopped (02/15/23 6823)  norepinephrine, 1-30 mcg/min, Last Rate: Stopped (02/15/23 1633)  propofol, 5-50 mcg/kg/min, Last Rate: Stopped (02/14/23 1158)  vasopressin, 0 04 Units/min, Last Rate: Stopped (02/15/23 2220)      PRN Meds:   fentanyl citrate (PF), 50 mcg, Q1H PRN  polyethylene glycol, 17 g, Daily PRN        Allergies   No Known Allergies  ---------------------------------------------------------------------------------------  Advance Directive and Living Will:      Power of :    POLST:    ---------------------------------------------------------------------------------------  Care Time Delivered:   Upon my evaluation, this patient had a high probability of imminent or life-threatening deterioration due to COPD exacerbation requirng mechanical ventillation, which required my direct attention, intervention, and personal management  I have personally provided 30 minute of critical care time, exclusive of procedures, teaching, family meetings, and any prior time recorded by providers other than myself  Abad Carvajal DO      Portions of the record may have been created with voice recognition software  Occasional wrong word or "sound a like" substitutions may have occurred due to the inherent limitations of voice recognition software    Read the chart carefully and recognize, using context, where substitutions have occurred

## 2023-02-16 NOTE — SOCIAL WORK
MSW stopped by the pt room and his wife and son were in the room along with his niece, Inder Vergara  The pt reportedly has done well since he was extubated last evening  The pt was verbal and joking with his family about food  The pt wife was relaxed at times and remains cautiously optimistic about the pt  The pt wife shared information about how she and the pt met when she was 15years old  She spoke about the pt being a hardworking man  The pt wife was less tearful to day and states she did eat yesterday  The pt was encouraged to eat more then once today  MSW stressed the importance of her keeping up her strength  MSW also encouraged the pt wife to celebrate the current situation  MSW spoke with the nurse and he states the pt will be able to come off the BiPap machine  He states the pt was doing well on 6 Liters of O2  If the pt continues doing well he may be able to leave the MICU  Palliative will continue following     I have spent 35 minutes with Patient /Family today in which greater than 50% of this time was spent in counseling/coordination of care regarding ongoing emotional support      Palliative  will follow-up as requested by patient, family, and primary team   Please contact with any specific requests

## 2023-02-16 NOTE — PLAN OF CARE
Problem: SAFETY,RESTRAINT: NV/NON-SELF DESTRUCTIVE BEHAVIOR  Goal: Remains free of harm/injury (restraint for non violent/non self-detsructive behavior)  Description: INTERVENTIONS:  - Instruct patient/family regarding restraint use   - Assess and monitor physiologic and psychological status   - Provide interventions and comfort measures to meet assessed patient needs   - Identify and implement measures to help patient regain control  - Assess readiness for release of restraint   Outcome: Progressing  Goal: Returns to optimal restraint-free functioning  Description: INTERVENTIONS:  - Assess the patient's behavior and symptoms that indicate continued need for restraint  - Identify and implement measures to help patient regain control  - Assess readiness for release of restraint   Outcome: Progressing     Problem: MOBILITY - ADULT  Goal: Maintain or return to baseline ADL function  Description: INTERVENTIONS:  -  Assess patient's ability to carry out ADLs; assess patient's baseline for ADL function and identify physical deficits which impact ability to perform ADLs (bathing, care of mouth/teeth, toileting, grooming, dressing, etc )  - Assess/evaluate cause of self-care deficits   - Assess range of motion  - Assess patient's mobility; develop plan if impaired  - Assess patient's need for assistive devices and provide as appropriate  - Encourage maximum independence but intervene and supervise when necessary  - Involve family in performance of ADLs  - Assess for home care needs following discharge   - Consider OT consult to assist with ADL evaluation and planning for discharge  - Provide patient education as appropriate  Outcome: Progressing  Goal: Maintains/Returns to pre admission functional level  Description: INTERVENTIONS:  - Perform BMAT or MOVE assessment daily    - Set and communicate daily mobility goal to care team and patient/family/caregiver     - Collaborate with rehabilitation services on mobility goals if consulted  - Perform Range of Motion 4 times a day  - Reposition patient every 2 hours    - Record patient progress and toleration of activity level   Outcome: Progressing     Problem: PAIN - ADULT  Goal: Verbalizes/displays adequate comfort level or baseline comfort level  Description: Interventions:  - Encourage patient to monitor pain and request assistance  - Assess pain using appropriate pain scale  - Administer analgesics based on type and severity of pain and evaluate response  - Implement non-pharmacological measures as appropriate and evaluate response  - Consider cultural and social influences on pain and pain management  - Notify physician/advanced practitioner if interventions unsuccessful or patient reports new pain  Outcome: Progressing     Problem: INFECTION - ADULT  Goal: Absence or prevention of progression during hospitalization  Description: INTERVENTIONS:  - Assess and monitor for signs and symptoms of infection  - Monitor lab/diagnostic results  - Monitor all insertion sites, i e  indwelling lines, tubes, and drains  - Monitor endotracheal if appropriate and nasal secretions for changes in amount and color  - [place] appropriate cooling/warming therapies per order  - Administer medications as ordered  - Instruct and encourage patient and family to use good hand hygiene technique  - Identify and instruct in appropriate isolation precautions for identified infection/condition  Outcome: Progressing  Goal: Absence of fever/infection during neutropenic period  Description: INTERVENTIONS:  - Monitor WBC    Outcome: Progressing     Problem: SAFETY ADULT  Goal: Maintain or return to baseline ADL function  Description: INTERVENTIONS:  -  Assess patient's ability to carry out ADLs; assess patient's baseline for ADL function and identify physical deficits which impact ability to perform ADLs (bathing, care of mouth/teeth, toileting, grooming, dressing, etc )  - Assess/evaluate cause of self-care deficits   - Assess range of motion  - Assess patient's mobility; develop plan if impaired  - Assess patient's need for assistive devices and provide as appropriate  - Encourage maximum independence but intervene and supervise when necessary  - Involve family in performance of ADLs  - Assess for home care needs following discharge   - Consider OT consult to assist with ADL evaluation and planning for discharge  - Provide patient education as appropriate  Outcome: Progressing  Goal: Maintains/Returns to pre admission functional level  Description: INTERVENTIONS:  - Perform BMAT or MOVE assessment daily    - Set and communicate daily mobility goal to care team and patient/family/caregiver  - Collaborate with rehabilitation services on mobility goals if consulted  - Perform Range of Motion 4 times a day  - Reposition patient every 2 hours    - Record patient progress and toleration of activity level   Outcome: Progressing  Goal: Patient will remain free of falls  Description: INTERVENTIONS:  - Educate patient/family on patient safety including physical limitations  - Instruct patient to call for assistance with activity   - Consult OT/PT to assist with strengthening/mobility   - Keep Call bell within reach  - Keep bed low and locked with side rails adjusted as appropriate  - Keep care items and personal belongings within reach  - Initiate and maintain comfort rounds  - Make Fall Risk Sign visible to staff  - Offer Toileting every 4 Hours, in advance of need  - Initiate/Maintain bed alarm  - Apply yellow socks and bracelet for high fall risk patients  - Consider moving patient to room near nurses station  Outcome: Progressing     Problem: DISCHARGE PLANNING  Goal: Discharge to home or other facility with appropriate resources  Description: INTERVENTIONS:  - Identify barriers to discharge w/patient and caregiver  - Arrange for needed discharge resources and transportation as appropriate  - Identify discharge learning needs (meds, wound care, etc )  - Arrange for interpretive services to assist at discharge as needed  - Refer to Case Management Department for coordinating discharge planning if the patient needs post-hospital services based on physician/advanced practitioner order or complex needs related to functional status, cognitive ability, or social support system  Outcome: Progressing     Problem: Knowledge Deficit  Goal: Patient/family/caregiver demonstrates understanding of disease process, treatment plan, medications, and discharge instructions  Description: Complete learning assessment and assess knowledge base  Interventions:  - Provide teaching at level of understanding  - Provide teaching via preferred learning methods  Outcome: Progressing     Problem: Prexisting or High Potential for Compromised Skin Integrity  Goal: Skin integrity is maintained or improved  Description: INTERVENTIONS:  - Identify patients at risk for skin breakdown  - Assess and monitor skin integrity  - Assess and monitor nutrition and hydration status  - Monitor labs   - Assess for incontinence   - Turn and reposition patient  - Assist with mobility/ambulation  - Relieve pressure over bony prominences  - Avoid friction and shearing  - Provide appropriate hygiene as needed including keeping skin clean and dry  - Evaluate need for skin moisturizer/barrier cream  - Collaborate with interdisciplinary team   - Patient/family teaching  - Consider wound care consult   Outcome: Progressing     Problem: Nutrition/Hydration-ADULT  Goal: Nutrient/Hydration intake appropriate for improving, restoring or maintaining nutritional needs  Description: Monitor and assess patient's nutrition/hydration status for malnutrition  Collaborate with interdisciplinary team and initiate plan and interventions as ordered  Monitor patient's weight and dietary intake as ordered or per policy  Utilize nutrition screening tool and intervene as necessary   Determine patient's food preferences and provide high-protein, high-caloric foods as appropriate       INTERVENTIONS:  - Monitor oral intake, urinary output, labs, and treatment plans  - Assess nutrition and hydration status and recommend course of action  - Evaluate amount of meals eaten  - Assist patient with eating if necessary   - Allow adequate time for meals  - Recommend/ encourage appropriate diets, oral nutritional supplements, and vitamin/mineral supplements  - Order, calculate, and assess calorie counts as needed  - Recommend, monitor, and adjust tube feedings and TPN/PPN based on assessed needs  - Assess need for intravenous fluids  - Provide specific nutrition/hydration education as appropriate  - Include patient/family/caregiver in decisions related to nutrition  Outcome: Progressing

## 2023-02-16 NOTE — QUICK NOTE
Explained all new results and on-going treatment plan to the patient's family at bedside  All questions and concerns were answered and addressed appropriately          ----------------------------------------------------  Celia Burris DO, MS, PGY-1  Internal Medicine Residency   [address] [place] [address]

## 2023-02-17 PROBLEM — E46 PROTEIN-CALORIE MALNUTRITION (HCC): Status: ACTIVE | Noted: 2022-08-27

## 2023-02-17 PROBLEM — E87.1 HYPONATREMIA: Status: RESOLVED | Noted: 2023-02-17 | Resolved: 2023-02-17

## 2023-02-17 PROBLEM — I50.23 ACUTE ON CHRONIC HFREF (HEART FAILURE WITH REDUCED EJECTION FRACTION) (HCC): Status: ACTIVE | Noted: 2022-09-12

## 2023-02-17 PROBLEM — J96.22 ACUTE ON CHRONIC RESPIRATORY FAILURE WITH HYPERCAPNIA (HCC): Status: ACTIVE | Noted: 2020-11-19

## 2023-02-17 PROBLEM — E87.1 HYPONATREMIA: Status: ACTIVE | Noted: 2023-02-17

## 2023-02-17 PROBLEM — R65.10 SIRS (SYSTEMIC INFLAMMATORY RESPONSE SYNDROME) (HCC): Status: ACTIVE | Noted: 2023-02-17

## 2023-02-17 PROBLEM — D64.9 ANEMIA: Status: ACTIVE | Noted: 2023-02-17

## 2023-02-17 LAB
ANION GAP SERPL CALCULATED.3IONS-SCNC: 0 MMOL/L (ref 4–13)
ATRIAL RATE: 115 BPM
BACTERIA BLD CULT: NORMAL
BACTERIA BLD CULT: NORMAL
BUN SERPL-MCNC: 26 MG/DL (ref 5–25)
CA-I BLD-SCNC: 1.1 MMOL/L (ref 1.12–1.32)
CALCIUM SERPL-MCNC: 8.5 MG/DL (ref 8.3–10.1)
CHLORIDE SERPL-SCNC: 91 MMOL/L (ref 96–108)
CO2 SERPL-SCNC: 44 MMOL/L (ref 21–32)
CREAT SERPL-MCNC: 0.37 MG/DL (ref 0.6–1.3)
ERYTHROCYTE [DISTWIDTH] IN BLOOD BY AUTOMATED COUNT: 12.7 % (ref 11.6–15.1)
GFR SERPL CREATININE-BSD FRML MDRD: 128 ML/MIN/1.73SQ M
GLUCOSE SERPL-MCNC: 132 MG/DL (ref 65–140)
GLUCOSE SERPL-MCNC: 136 MG/DL (ref 65–140)
GLUCOSE SERPL-MCNC: 148 MG/DL (ref 65–140)
GLUCOSE SERPL-MCNC: 152 MG/DL (ref 65–140)
GLUCOSE SERPL-MCNC: 167 MG/DL (ref 65–140)
GLUCOSE SERPL-MCNC: 193 MG/DL (ref 65–140)
GLUCOSE SERPL-MCNC: 292 MG/DL (ref 65–140)
HCT VFR BLD AUTO: 23.6 % (ref 36.5–49.3)
HGB BLD-MCNC: 7.2 G/DL (ref 12–17)
MAGNESIUM SERPL-MCNC: 2.1 MG/DL (ref 1.6–2.6)
MCH RBC QN AUTO: 31.4 PG (ref 26.8–34.3)
MCHC RBC AUTO-ENTMCNC: 30.5 G/DL (ref 31.4–37.4)
MCV RBC AUTO: 103 FL (ref 82–98)
P AXIS: 69 DEGREES
PHOSPHATE SERPL-MCNC: 3 MG/DL (ref 2.3–4.1)
PLATELET # BLD AUTO: 188 THOUSANDS/UL (ref 149–390)
PMV BLD AUTO: 9.6 FL (ref 8.9–12.7)
POTASSIUM SERPL-SCNC: 3.7 MMOL/L (ref 3.5–5.3)
PR INTERVAL: 158 MS
PROCALCITONIN SERPL-MCNC: 0.09 NG/ML
QRS AXIS: 42 DEGREES
QRSD INTERVAL: 121 MS
QT INTERVAL: 313 MS
QTC INTERVAL: 433 MS
RBC # BLD AUTO: 2.29 MILLION/UL (ref 3.88–5.62)
SODIUM SERPL-SCNC: 135 MMOL/L (ref 135–147)
T WAVE AXIS: 203 DEGREES
VENTRICULAR RATE: 115 BPM
WBC # BLD AUTO: 8.41 THOUSAND/UL (ref 4.31–10.16)

## 2023-02-17 RX ORDER — INSULIN LISPRO 100 [IU]/ML
1-5 INJECTION, SOLUTION INTRAVENOUS; SUBCUTANEOUS
Status: DISCONTINUED | OUTPATIENT
Start: 2023-02-18 | End: 2023-02-28 | Stop reason: HOSPADM

## 2023-02-17 RX ORDER — INSULIN LISPRO 100 [IU]/ML
1-5 INJECTION, SOLUTION INTRAVENOUS; SUBCUTANEOUS
Status: DISCONTINUED | OUTPATIENT
Start: 2023-02-17 | End: 2023-02-28 | Stop reason: HOSPADM

## 2023-02-17 RX ORDER — FUROSEMIDE 20 MG/1
20 TABLET ORAL DAILY
Status: DISCONTINUED | OUTPATIENT
Start: 2023-02-19 | End: 2023-02-27

## 2023-02-17 RX ORDER — ECHINACEA PURPUREA EXTRACT 125 MG
1 TABLET ORAL
Status: DISCONTINUED | OUTPATIENT
Start: 2023-02-17 | End: 2023-02-17 | Stop reason: SDUPTHER

## 2023-02-17 RX ORDER — INSULIN LISPRO 100 [IU]/ML
2-12 INJECTION, SOLUTION INTRAVENOUS; SUBCUTANEOUS EVERY 6 HOURS SCHEDULED
Status: DISCONTINUED | OUTPATIENT
Start: 2023-02-17 | End: 2023-02-17

## 2023-02-17 RX ORDER — ECHINACEA PURPUREA EXTRACT 125 MG
2 TABLET ORAL
Status: DISCONTINUED | OUTPATIENT
Start: 2023-02-17 | End: 2023-02-28 | Stop reason: HOSPADM

## 2023-02-17 RX ORDER — WATER 1000 ML/1000ML
INJECTION, SOLUTION INTRAVENOUS
Status: COMPLETED
Start: 2023-02-17 | End: 2023-02-17

## 2023-02-17 RX ORDER — ACETAZOLAMIDE 500 MG/5ML
500 INJECTION, POWDER, LYOPHILIZED, FOR SOLUTION INTRAVENOUS EVERY 12 HOURS SCHEDULED
Status: DISCONTINUED | OUTPATIENT
Start: 2023-02-17 | End: 2023-02-18

## 2023-02-17 RX ADMIN — PRAVASTATIN SODIUM 80 MG: 20 TABLET ORAL at 15:53

## 2023-02-17 RX ADMIN — FUROSEMIDE 20 MG: 10 INJECTION, SOLUTION INTRAMUSCULAR; INTRAVENOUS at 07:34

## 2023-02-17 RX ADMIN — BUDESONIDE 0.5 MG: 0.5 INHALANT ORAL at 07:46

## 2023-02-17 RX ADMIN — INSULIN LISPRO 1 UNITS: 100 INJECTION, SOLUTION INTRAVENOUS; SUBCUTANEOUS at 23:26

## 2023-02-17 RX ADMIN — BUDESONIDE 0.5 MG: 0.5 INHALANT ORAL at 20:27

## 2023-02-17 RX ADMIN — ASPIRIN 81 MG CHEWABLE TABLET 81 MG: 81 TABLET CHEWABLE at 08:34

## 2023-02-17 RX ADMIN — FORMOTEROL FUMARATE DIHYDRATE 20 MCG: 20 SOLUTION RESPIRATORY (INHALATION) at 07:46

## 2023-02-17 RX ADMIN — LEVALBUTEROL HYDROCHLORIDE 1.25 MG: 1.25 SOLUTION, CONCENTRATE RESPIRATORY (INHALATION) at 20:27

## 2023-02-17 RX ADMIN — CEFTRIAXONE 1000 MG: 1 INJECTION, POWDER, FOR SOLUTION INTRAMUSCULAR; INTRAVENOUS at 07:06

## 2023-02-17 RX ADMIN — CHLORHEXIDINE GLUCONATE 0.12% ORAL RINSE 15 ML: 1.2 LIQUID ORAL at 08:34

## 2023-02-17 RX ADMIN — IPRATROPIUM BROMIDE 0.5 MG: 0.5 SOLUTION RESPIRATORY (INHALATION) at 07:46

## 2023-02-17 RX ADMIN — TAMSULOSIN HYDROCHLORIDE 0.4 MG: 0.4 CAPSULE ORAL at 15:53

## 2023-02-17 RX ADMIN — DICLOFENAC SODIUM 2 G: 10 GEL TOPICAL at 15:53

## 2023-02-17 RX ADMIN — METHYLPREDNISOLONE SODIUM SUCCINATE 40 MG: 40 INJECTION, POWDER, FOR SOLUTION INTRAMUSCULAR; INTRAVENOUS at 15:53

## 2023-02-17 RX ADMIN — METHYLPREDNISOLONE SODIUM SUCCINATE 40 MG: 40 INJECTION, POWDER, FOR SOLUTION INTRAMUSCULAR; INTRAVENOUS at 21:21

## 2023-02-17 RX ADMIN — IPRATROPIUM BROMIDE 0.5 MG: 0.5 SOLUTION RESPIRATORY (INHALATION) at 20:26

## 2023-02-17 RX ADMIN — DICLOFENAC SODIUM 2 G: 10 GEL TOPICAL at 21:21

## 2023-02-17 RX ADMIN — INSULIN LISPRO 2 UNITS: 100 INJECTION, SOLUTION INTRAVENOUS; SUBCUTANEOUS at 00:04

## 2023-02-17 RX ADMIN — FERROUS SULFATE TAB 325 MG (65 MG ELEMENTAL FE) 325 MG: 325 (65 FE) TAB at 08:34

## 2023-02-17 RX ADMIN — METHYLPREDNISOLONE SODIUM SUCCINATE 40 MG: 40 INJECTION, POWDER, FOR SOLUTION INTRAMUSCULAR; INTRAVENOUS at 06:19

## 2023-02-17 RX ADMIN — ESCITALOPRAM OXALATE 5 MG: 5 TABLET, FILM COATED ORAL at 08:35

## 2023-02-17 RX ADMIN — IPRATROPIUM BROMIDE 0.5 MG: 0.5 SOLUTION RESPIRATORY (INHALATION) at 13:32

## 2023-02-17 RX ADMIN — CHLORHEXIDINE GLUCONATE 0.12% ORAL RINSE 15 ML: 1.2 LIQUID ORAL at 21:34

## 2023-02-17 RX ADMIN — FORMOTEROL FUMARATE DIHYDRATE 20 MCG: 20 SOLUTION RESPIRATORY (INHALATION) at 20:26

## 2023-02-17 RX ADMIN — ACETAZOLAMIDE SODIUM 500 MG: 500 INJECTION, POWDER, LYOPHILIZED, FOR SOLUTION INTRAVENOUS at 21:22

## 2023-02-17 RX ADMIN — LEVALBUTEROL HYDROCHLORIDE 1.25 MG: 1.25 SOLUTION, CONCENTRATE RESPIRATORY (INHALATION) at 13:32

## 2023-02-17 RX ADMIN — INSULIN LISPRO 6 UNITS: 100 INJECTION, SOLUTION INTRAVENOUS; SUBCUTANEOUS at 11:50

## 2023-02-17 RX ADMIN — DICLOFENAC SODIUM 2 G: 10 GEL TOPICAL at 17:23

## 2023-02-17 RX ADMIN — LEVALBUTEROL HYDROCHLORIDE 1.25 MG: 1.25 SOLUTION, CONCENTRATE RESPIRATORY (INHALATION) at 07:46

## 2023-02-17 RX ADMIN — Medication 20 MG: at 08:34

## 2023-02-17 RX ADMIN — ACETAZOLAMIDE SODIUM 500 MG: 500 INJECTION, POWDER, LYOPHILIZED, FOR SOLUTION INTRAVENOUS at 11:02

## 2023-02-17 RX ADMIN — WATER 10 ML: 1 INJECTION INTRAMUSCULAR; INTRAVENOUS; SUBCUTANEOUS at 21:21

## 2023-02-17 NOTE — CASE MANAGEMENT
Case Management Discharge Planning Note    Patient name Jean Daigle MICU 10/MICU 10 MRN [id number]  : 1955 Date 2023       Current Admission Date: 2023  Current Admission Diagnosis:Chronic obstructive pulmonary disease [facility])   Patient Active Problem List    Diagnosis Date Noted   • Chronic HFrEF (heart failure with reduced ejection fraction) ([place] [address] ) 2022   • Mild protein-calorie malnutrition ([place] [address] ) 2022   • Pulmonary nodules/lesions, multiple 2022   • Former tobacco use 2022   • Prostate cancer ([place] [address] ) 2021   • BPH with obstruction/lower urinary tract symptoms 2021   • Chronic hypoxemic respiratory failure ([place] [address] ) 2020   • Macrocytosis without anemia 2019   • Other insomnia 2019   • Gastroesophageal reflux disease 2017   • Coronary artery disease 2016   • Mood disorder ([place] [address] ) 2015   • Impaired fasting glucose 2015   • Osteoporosis 10/14/2014   • Vitamin D deficiency 10/14/2014   • Cardiomyopathy, dilated ([place] [address] ) 2014   • Hypercholesterolemia    • Acute systolic congestive heart failure ([place] [address] ) 2013   • Left bundle-branch block 2013   • Osteoarthritis 2013   • KEVIN and COPD overlap syndrome ([place] [address] ) 2013   • Chronic obstructive pulmonary disease ([place] [address] ) 2012      LOS (days): 5  Geometric Mean LOS (GMLOS) (days): 5 00  Days to GMLOS:-0 2     OBJECTIVE:  Risk of Unplanned Readmission Score: 25 27         Current admission status: Inpatient   Preferred Pharmacy:   [id number]  [facility] [address]  Phone: [phone] Fax: [phone]    Primary Care Provider: [facility]    Primary Insurance: BLUE CROSS  Secondary Insurance: MEDICARE    DISCHARGE DETAILS:               Additional Comments: Patient not medically stable for discharge at this time    Awaiting PT/OT evaluations for recommendation for appropriate level of care, CM will follow and make referrals as needed

## 2023-02-17 NOTE — PROGRESS NOTES
Code Status: Level 3 - DNAR and DNI  POA:    POLST:      Reason for ICU admission: Acute on chronic hypercapnic respiratory failure 2/2 acute exacerbation of end-stage COPD (FEV1 22%)    Active problems:   Principal Problem:    Chronic obstructive pulmonary disease (HCC)  Active Problems:    Acute on chronic respiratory failure with hypercapnia (HCC)    Acute on chronic HFrEF (heart failure with reduced ejection fraction) ([facility])    KEVIN and COPD overlap syndrome ([facility])    Left bundle-branch block    Nonobstructive atherosclerosis of coronary artery    Gastroesophageal reflux disease    Protein-calorie malnutrition ([facility])    SIRS (systemic inflammatory response syndrome) ([facility] [address] )    Anemia  Resolved Problems:    * No resolved hospital problems  *    #Acute on chronic hypoxic respiratory failure, on 3L  #End stage COPD (FEV1 22%), in acute exacerbation  #KEVIN on BiPAP  · Follows with pulm as an outpatient  · Last FEV1 22%, consistent with very-severe COPD  · Home regiment:  · All nebs- Duo-Neb/Pulmicort/Perforomist  · Albuterol rescue inhaler  · Daily prednisone 5mg  · Multiple prior exacerbations requiring admission  · Intubated 2/12, extubated 2/15  · Intermittently requiring MF NC   Currently on 5-6L O2   · Wean O2 as able  · Maintain O2 sats between 88-92%  · Continue bronchodilators-- scheduled Xopenx/Atrovent QID, perforomist/pulmicort BID  · Continue Solu-medrol 40 mg TID  · Aggressive pulmonary hygiene- use of incentive spirometry, VEST/airway clearance, OOB to chair as able  · Palliative consulted, appreciate recommendations  · Goals of care discussed with patient with wife at bedside, patient to remain level 3 DNR/DNI   · Continue BiPAP 12/6/40% qHs for hx of KEVIN     #HFrEF; in acute exacerbation on admission, now euvolemic  · NYHA class: III  / ACC/AHA stage: C; in setting of very severe COPD (FEV1 22%)  · Follows with Dr Bhaskar Holbrook as an outpatient  · EF 40% PTA on lasix 20mg qd at home  · TTE on admission showing EF 20-25% with abnormal septal motion in setting of chronic LBBB, RVSP 38, mildly dilated RV  · Clinical presentation on arrival consistent with biventricular heart failure in setting of AECOPD  · Diuretics:  · S/P one-time dose 40 mg IV lasix on 2/12 --> IV lasix 20mg BID with good urine output  · Diuretics being held in setting of contraction alkalosis and to prevent further compensatory respiratory acidosis    · Initiated Diamox 500mg BID given elevated bicarb on diuresis   · Plan to transition to home dosed PO lasix 20mg qd 2/19  · Neurohormonal Blockade / GDMT:  · Beta blocker: lmited d/t soft BP, also concern with end-stage COPD  · ACEi/ARB/ARNI: can initiate if BP within appropriate paramaters  · Aldosterone antagonist:  jnone  · SGLT2i: cannot afford expensive meds  · Sudden cardiac death risk reduction:  · ICD/Life Vest candidacy: can be a candidate if repeat TTE shows EF<35%  · Of note, patient has known hx of LBBB with QRS around 120ms  · Cardiac diet, sodium restriction <2g, fluid restriction <1 5L  · Daily I&Os  · Consider repeat TTE in future to assess for improvement in systolic function  · MAP goal >65     #Nonobstructive CAD  · Coronary calcification seen on prior CT  · Continue with ASCVD prophylaxis with ASA and statin     #Nonischemic Cardiomayopathy, known history  #Chronic LBBB  · Chronic LBBB likely contributing to nonischemic cardiomyopathy  · Follows cardiology as an outpatient    #Anemia  · Hgb continues downward trend, though stable in 7's  No overt signs of GI bleed  · Iron panel suggesting mixed iron deficiency vs  anemia of chronic disease  · Retic index   74 suggesting hypoproliferation  · Heme occult pending  · Continue ferrous sulfate supplementation     #Hyponatremia, resolved  ? Na <135 initially, serum osm low at 276, patient hypervolemic on exam, urine sodium low <5, patient hypervolemic on exam, concerning hypervolemic hyponatremia  ?  Etiology likely HF exacerbation vs hypoalbuminemia   ? S/P albumin 5% 25g 2/14, which improved sodium to 132  ? Suspect lack of improvement likely due to free water flushes from tube feeds  ? Na normalized after free water flushes held with IV diuresis    #Pulmonary nodules  · Stable on prior imaging  · Continue outpatient monitoring  #GERD  · Known hx  · Continue PPI  #Hx of prostate cancer  · Documented history    Consultants:   Pulmonary     History of Present Illness:   73 yo M w/ hx of  chronic hypercapnic respiratory failure on 3L O2 2/2 end-stage COPD (FEV1 22%, %, DLCO 45%), HFrEF 30-40%, heavy tobacco use hx in remission, pulmonary nodules stable on imaging, pulmonary cachexia, HLD on statin, GERD, who is presented to [facility] after having  dyspnea, drowsiness for 2 days  Hx primarily provided by wife  On day of arrival, wife noted patient to be in a drowsy state and was noted to be hallucinating, prompting her to call EMS  Patient was intubated in the field by EMS  Summary of clinical course:   72yo M admitted for acute on chronic hypercapnic respiratory failure 2/2 end-stage COPD (FEV1 48%) complicated by acute on chronic biventricular HFrEF (20-25 on TTE on admission with signs of volume overload) initialy requiring intubation on the field 2/2 CO2 narcosis/inability to maintain airway  ABG upon arrival to ED 7 21/103  He was placed on an epi gtt in the ED which was weaned off  In the ICU, he was not responsive to stimuli, nonpurposeful myoclonic-like movements of mouth and hands  Intubated 2/12, extubated 2/15  Procalc elevated on admission, putum Cx on admission grew mixed respiratory verónica, gram positive cocci in pairs, and gram neg rods  Was initiated on 3d course azithromycin, completed, and 7d course IV CTX (on day 6/7)  Required aggressive diuresis with IV lasix with good urine o/p  Upon extubation, placed on 5-6L O2 with saturations 88-90%   Continued on scheduled nebs/bronchdilators with aggressive pul hygiene including IS and vest therapy, and BiPAP 12/6/40% qHS  Remains HDS  Clinically stable to be transferred to [facility] for further care and management  Recent or scheduled procedures:   Intubated 2/12  Extubated 2/15    Outstanding/pending diagnostics:   None    Cultures:   · Urine Cx 2/13 <10,000 cfu/ml with mixed contaminants x3  · Sputum Cx 2/12 with 3+ mixed respiratory verónica, 2+ gram positive cocci in pairs, 2+ gram neg rods  · U legionella negative  · U strep pneumo negative   · Blood Cx x2 without growth        Mobilization Plan:   PT/OT, OOB to chair, aggressive pulmonary hygiene     Nutrition Plan:   Cardiac diet, <2g Na, <1 5L fluids  Ensure supplements with meals  Consider nutrition consult due to pulmonary cachexia    Invasive Devices Review  Invasive Devices     Peripheral Intravenous Line  Duration           Peripheral IV 02/17/23 Left;Ventral (anterior) Forearm <1 day    Peripheral IV 02/17/23 Right;Ventral (anterior) Forearm <1 day                Rationale for remaining devices: labs    VTE Pharmacologic Prophylaxis: Enoxaparin (Lovenox)  VTE Mechanical Prophylaxis: sequential compression device    Discharge Plan:   Patient should be ready for discharge to home pending clinical stability    Initial Physical Therapy Recommendations: as applicable  Initial Occupational Therapy Recommendations: as applicate  Initial /Plan: dispo planning    Home medications that are not reordered and reason why:  Home inhalers switched to nebs in patient      Spoke with Dr Lalit Gonzales MD  regarding transfer  Please contact critical care via [employer] with any questions or concerns  Portions of the record may have been created with voice recognition software  Occasional wrong word or "sound a like" substitutions may have occurred due to the inherent limitations of voice recognition software    Read the chart carefully and recognize, using context, where substitutions have occurred        ----------------------------------------------------  Ulises Piña DO, MS, PGY-1  Internal Medicine Residency   [address] [facility], [address]

## 2023-02-17 NOTE — PROGRESS NOTES
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%), heart failure with reduced ejection fraction (EF 40%), pulmonary nodules, protein-calorie malnutrition, hyperlipidemia, gastroesophageal reflux, impaired fasting glucose, osteoarthritis, osteoporosis, vitamin D deficiency, chronic hypoxemic and hypercapnic respiratory failure, benign prostate hyperplasia, prostate cancer, and former tobacco (105 PYH; quit 2012) abuse presents to [facility] due to shortness of breath  History obtained from ED documentation, chart review, and patient's spouse due to acute clinical situation  Per above the patient's symptoms have been starting over the past 1-2 days  He had been more drowsy than his baseline per wife  Falling asleep in his wheelchair  He was complaining of shortness of breath  The day prior to arrival he was hallucinating stating "something about a blue light" overnight he was in severe respiratory distress and was unable to speak  Wife became concerned and called 911  EMS arrival found the patient in severe respiratory distress and he was subsequently intubated in the field  Wife denies recent sick contacts, fever at home, no coughing, no sputum production  Wheezing at home  Short of breath  24h events: Desatted on 5L 2/16 AM, required albuterol nebs, subsequently placed on BiPAP 12/6/40%, continued aggressive pulmonary hygiene incluing VEST TID, which alleviated his symptoms  Was off BiPAP at 1600 and placed back on 5L O2 NC  No acute events overnights, placed on BiPAP overnight (12/6/40%); patient uses CPAP at home qHS  ---------------------------------------------------------------------------------------  SUBJECTIVE  Patient seen and examined  No acute overnight events  States SOB and productive cough improved with scheduled VEST therapy and bronchodilators/nebs  Currently on BiPAP  Denies CP  Tolerating PO  Review of Systems  Review of systems was unable to be performed secondary to intubated and sedated  ---------------------------------------------------------------------------------------  Assessment and Plan:    #Endstage COPD, FEV1 22%, in acute exacerbation  #Acute on chronic HFrEF 40%  #Anemia  #Pulmonary nodules    Plan:     Neuro:   · RASS 0; goal 0 to -1  CPOT>2  · No longer on sedation  · Successful extubation 2/15  · Delirium precautions, OOB to chair as able     CV:   · Diagnosis: HFrEF  · NYHA class: III  / ACC/AHA stage: C; in setting of very severe COPD (FEV1 22%)  · Follows with Dr Gloria Rosales as an outpatient  · EF 40% PTA on lasix 20mg qd at home  · TTE on admission showing EF 20-25% with abnormal septal motion in setting of chronic LBBB, RVSP 38, mildly dilated RV  · Clinical presentation on arrival consistent with biventricular heart failure in setting of AECOPD  · Diuretics:  · S/P one-time dose 40 mg IV lasix on 2/12 --> IV lasix 20mg BID with good urine output  · Diuretics being held in setting of contraction alkalosis and to prevent further compensatory respiratory acidosis    · Initiated Diamox 500mg BID given elevated bicarb on diuresis   · Plan to transition to home dosed PO lasix 20mg qd 2/19  · Neurohormonal Blockade / GDMT:  · Beta blocker: lmited d/t soft BP, also concern with end-stage COPD    · ACEi/ARB/ARNI: can initiate if BP within appropriate paramaters  · Aldosterone antagonist:  jnone  · SGLT2i: cannot afford expensive meds  · Sudden cardiac death risk reduction:  · ICD/Life Vest candidacy: can be a candidate if repeat TTE shows EF<35%  · Of note, patient has known hx of LBBB with QRS around 120ms  · Cardiac diet, sodium restriction <2g, fluid restriction <1 5L  · Daily I&Os  · Consider repeat TTE in future to assess for improvement in systolic function  · MAP goal >65   · Diagnosis: Nonobstructive CAD  · Coronary calcification seen on prior CT  · Continue with ASCVD prophylaxis with ASA and statin   · Diagnosis: Nonischemic Cardiomayopathy, known history  · Diagnosis: Chronic LBBB  · Chronic LBBB likely contributing to nonischemic cardiomyopathy  · Follows cardiology as an outpatient     Pulm:  · Diagnosis: Acute on chronic hypoxic respiratory failure   · Diagnosis: End stage COPD (FEV1 22%), in acute exacerbation  · Follows with pulm as an outpatient  · Last FEV1 22%, consistent with very-severe COPD  · Home regiment:  · 3L O2 at home  · All nebs- Duo-Neb/Pulmicort/Perforomist  · Albuterol rescue inhaler  · Daily prednisone 5mg  · Multiple prior exacerbations requiring admission  · Intubated 2/12, extubated 2/15  · Intermittently requiring MF NC   Currently on 5-6L O2   · Wean O2 as able  · Maintain O2 sats between 88-92%  · Continue bronchodilators-- scheduled Xopenx/Atrovent QID, perforomist/pulmicort BID  · Continue Solu-medrol 40 mg TID  · Aggressive pulmonary hygiene- use of incentive spirometry, VEST/airway clearance, OOB to chair as able  · Palliative consulted, appreciate recommendations  · Goals of care discussed with patient with wife at bedside, patient to remain level 3 DNR/DNI   · Diagnosis: KEVIN and COPD overlap  · Known hx, uses BiPAP at home  · Continue BiPAP 12/6/40% qHs  GI:   · Diagnosis: GERD, known history  · Continue stress ulcer prophylaxis in setting of concern for prolonged intubation     :   · No active issues  · Tyler placemed for accurate measurements of I&Os while attempting diuresis  · Plan for possible tyler removal today      F/E/N:   · Avoid additional IVF d/t decompensated heart failure  · Diagnosis: Hyponatremia, resolved  · Na <135 initially, serum osm low at 276, patient hypervolemic on exam, urine sodium low <5, patient hypervolemic on exam, concerning hypervolemic hyponatremia  · Etiology likely HF exacerbation vs hypoalbuminemia   · S/P albumin 5% 25g 2/14, which improved sodium to 132  · Suspect lack of improvement likely due to free water flushes from tube feeds  · Na normalized after free water flushes held and was continued on diuresis  · Monitor electrolytes; goal K>4, Phos>3, Mag>2  · Cardiac diet; sodium restriction <2g, fluid restriction <1 5L    Heme/Onc:   · Diagnosis: Pulmonary nodules  · Stable on imaging  · Serial monitoring outpatient  · Diagnosis: Documented history of prostate cancer  · Diagnosis: Anemia  · Hgb continues downward trend, though stable in 7's  No overt signs of GI bleed  · Iron panel suggesting mixed iron deficiency vs  anemia of chronic disease  · Retic index   74 suggesting hypoproliferation  · Heme occult pending  · Continue ferrous sulfate supplementation   · Continue DVT ppx     Endo:   · Fasting  this AM, receiving 2U subq SSI, at goal  · A1c 5 4  · On SSI for BS control  · BS Goal 140-180    ID:   · Meets SIRS criteria on admission 2/12, procalcitonin elevated on admission  · Procalc now normalized 2/16  · S/P 3-day course of azithromycin   · Continue ceftriaxone 1g, day 6/7  · Trend CBC and fever curve     MSK/Skin:   · Pressure ulcer ppx  · UOOB and early mobilization        Patient appropriate for transfer out of the ICU today?: No  Disposition: Admit to Critical Care   Code Status: Level 3 - DNAR and DNI  ---------------------------------------------------------------------------------------  ICU CORE MEASURES    Prophylaxis   VTE Pharmacologic Prophylaxis: Enoxaparin (Lovenox)  VTE Mechanical Prophylaxis: sequential compression device  Stress Ulcer Prophylaxis: Omeprazole PO    ABCDE Protocol (if indicated)  Plan to perform spontaneous awakening trial today? No  Plan to perform spontaneous breathing trial today? No  Obvious barriers to extubation? Yes  CAM-ICU: Positive    Invasive Devices Review  Invasive Devices     Central Venous Catheter Line  Duration           CVC Central Lines 23 Triple 16cm 4 days          Peripheral Intravenous Line  Duration           Peripheral IV 23 Distal;Right;Ventral (anterior) Forearm 5 days    Peripheral IV 23 Left Antecubital 5 days    Peripheral IV 23 Right;Ventral (anterior) Forearm <1 day          Drain  Duration           Urethral Catheter 4 days              Can any invasive devices be discontinued today? No  ---------------------------------------------------------------------------------------  OBJECTIVE    Vitals   Vitals:    23 0100 23 0130 23 0200 23 0600   BP: 96/61 97/61 95/60    BP Location:       Pulse: 86 86 84    Resp: 20 15 15    Temp:       TempSrc:       SpO2: 100% 100% 100%    Weight:    58 1 kg (128 lb 1 4 oz)   Height:         Temp (24hrs), Av °F (37 2 °C), Min:97 8 °F (36 6 °C), Max:99 3 °F (37 4 °C)  Current: Temperature: 97 8 °F (36 6 °C)  HR: 86  BP: 113/67  RR: 23  SpO2: 98%    Respiratory:  SpO2 Device: O2 Device: BiPAP  Nasal Cannula O2 Flow Rate (L/min): 15 L/min    Invasive/non-invasive ventilation settings   Respiratory    Lab Data (Last 4 hours)    None         O2/Vent Data (Last 4 hours)       0441          Non-Invasive Ventilation Mode BiPAP                   Physical Exam  Constitutional:       General: He is not in acute distress  Appearance: He is ill-appearing  Comments: Cachectic    HENT:      Head: Normocephalic and atraumatic  Mouth/Throat:      Mouth: Mucous membranes are moist    Cardiovascular:      Rate and Rhythm: Normal rate  Heart sounds: Normal heart sounds  No murmur heard  Pulmonary:      Breath sounds: Wheezing (minimal) present  No rhonchi  Comments: On BiPAP /40% FiO2  Abdominal:      General: Bowel sounds are normal  There is no distension  Palpations: Abdomen is soft  Tenderness: There is no abdominal tenderness  Musculoskeletal:      Right lower leg: Edema (minimal pedal) present  Left lower leg: Edema (minimal pedal) present  Skin:     General: Skin is warm  Neurological:      Mental Status: He is oriented to person, place, and time            Laboratory and Diagnostics:  Results from last 7 days   Lab Units 02/17/23  0436 02/16/23 0447 02/15/23  0507 02/14/23  0517 02/13/23  0429 02/12/23  0759 02/12/23  0529   WBC Thousand/uL 8 41 10 56* 9 97 7 51 10 91*  --  7 24   HEMOGLOBIN g/dL 7 2* 7 3* 7 2* 7 4* 8 1*  --  8 9*   HEMATOCRIT % 23 6* 23 5* 22 5* 23 0* 25 7*  --  28 5*   PLATELETS Thousands/uL 188 185 179 179 208 220 204   NEUTROS PCT %  --  91* 89* 91* 87*  --  73   MONOS PCT %  --  6 9 6 10  --  17*     Results from last 7 days   Lab Units 02/17/23  0436 02/16/23  0447 02/15/23  0507 02/14/23  0517 02/13/23  0429 02/12/23  0529   SODIUM mmol/L 135 135 130* 132* 129* 129*   POTASSIUM mmol/L 3 7 3 8 3 5 3 7 3 5 4 0   CHLORIDE mmol/L 91* 92* 88* 88* 85* 86*   CO2 mmol/L 44* 42* 40* 38* 38* 39*   ANION GAP mmol/L 0* 1* 2* 6 6 4   BUN mg/dL 26* 30* 38* 31* 20 20   CREATININE mg/dL 0 37* 0 37* 0 45* 0 51* 0 51* 0 56*   CALCIUM mg/dL 8 5 8 7 8 2* 8 2* 8 2* 8 2*   GLUCOSE RANDOM mg/dL 132 141* 276* 204* 139 114   ALT U/L  --  29 24 23 22 24   AST U/L  --  35 32 38 44 39   ALK PHOS U/L  --  44* 49 44* 53 64   ALBUMIN g/dL  --  3 1* 3 0* 3 2* 2 9* 2 9*   TOTAL BILIRUBIN mg/dL  --  0 59 0 48 0 53 0 63 0 51     Results from last 7 days   Lab Units 02/17/23 0436 02/16/23  0447 02/15/23  0507 02/14/23  0517 02/13/23  0429   MAGNESIUM mg/dL 2 1 2 2 2 2 2 2 2 0   PHOSPHORUS mg/dL 3 0 2 8 2 5 3 7 3 1      Results from last 7 days   Lab Units 02/12/23  0529   INR  0 93   PTT seconds 27          Results from last 7 days   Lab Units 02/12/23  0529   LACTIC ACID mmol/L 1 2     ABG:  Results from last 7 days   Lab Units 02/16/23  1014   PH ART 7 395   PCO2 ART mm Hg 65 8*   PO2 ART mm Hg 136 0*   HCO3 ART mmol/L 39 4*   BASE EXC ART mmol/L 12 8   ABG SOURCE  Line, Arterial     VBG:  Results from last 7 days   Lab Units 02/16/23  1014 02/13/23  1304 02/13/23  1204   PH JHONATHAN   --   --  7 458*   PCO2 JHONATHAN mm Hg  --   --  57 5*   PO2 JHONATHAN mm Hg  --   --  39 7   HCO3 JHONATHAN mmol/L  --   --  39 8*   BASE EXC JHONATHAN mmol/L  --   --  14 1   ABG SOURCE  Line, Arterial   < >  --     < > = values in this interval not displayed  Results from last 7 days   Lab Units 02/17/23  0436 02/13/23  0429 02/12/23  0529   PROCALCITONIN ng/ml 0 09 0 87* 0 05       Micro  Results from last 7 days   Lab Units 02/13/23  0859 02/12/23  0834 02/12/23  0803 02/12/23  0529   BLOOD CULTURE   --   --   --  No Growth After 4 Days  No Growth After 4 Days  SPUTUM CULTURE   --  3+ Growth of  --   --    GRAM STAIN RESULT   --  2+ Gram positive cocci in pairs*  2+ Gram negative rods*  Rare Polys*  --   --    URINE CULTURE  <10,000 cfu/ml  --   --   --    LEGIONELLA URINARY ANTIGEN   --   --  Negative  --    STREP PNEUMONIAE ANTIGEN, URINE   --   --  Negative  --        EKG: NSR, T-wave inversions in inferior leads, LBBB, unchanged from prior ECGs    Imaging:   XR chest portable ICU   Final Result by Randy Florez MD (02/15 1446)      Endotracheal tube projects 2-3 cm above the ghada  Stable moderate pulmonary vascular congestion  Workstation performed: [id number]         XR chest portable ICU   Final Result by Kermit Goncalves MD (02/13 1223)   Tubes and lines as above without pneumothorax  Recommend advancement of nasogastric tube  No acute cardiopulmonary disease  The study was marked in [facility] for immediate notification  Workstation performed: [id number]         XR chest portable   Final Result by Iggy Fatima MD (71/79 [id number])      1  Persistent high positioning of the endotracheal tube  Advancement by at least 4 cm advised     2   Tip of right internal jugular central venous catheter projects over the lower SVC  No pneumothorax  3   Persistent pulmonary vascular congestion  The study was marked in [facility] for immediate notification  Workstation performed: [id number]         CT head wo contrast   Final Result by Rachael Rollins MD (02/12 1241)      No acute intracranial abnormality  Workstation performed: [id number]         XR chest 1 view portable   ED Interpretation by Chepe Dunbar MD (02/12 1538)   Increased cephalization on LEFT > RIGHT  ITubated       Final Result by Aminta Levy MD (02/12 0717)      Moderate pulmonary venous congestion  ET tube 6 cm above the ghada  Workstation performed: [id number]            I have personally reviewed pertinent reports  Intake and Output  I/O       02/11 0701 02/12 0700 02/12 0701 02/13 0700 02/13 0701 02/14 0700    I V  (mL/kg)  727 6 (13 4)     IV Piggyback  475     Total Intake(mL/kg)  1202 6 (22 2)     Urine (mL/kg/hr)  2135 (1 6)     Emesis/NG output  50     Stool  0     Total Output  2185     Net  -982  4            Unmeasured Stool Occurrence  0 x         UOP: 2135 ml/hr     Height and Weights   Height: 5' (152 4 cm)  IBW (Ideal Body Weight): 50 kg  Body mass index is 25 02 kg/m²  Weight (last 2 days)     Date/Time Weight    02/17/23 0600 58 1 (128 09)    02/15/23 0532 59 8 (131 84)            Nutrition       Diet Orders   (From admission, onward)             Start     Ordered    02/16/23 1241  Diet Cardiovascular; Cardiac; Sodium 2 GM, Fluid Restriction 1800 ML  Diet effective now        References:    Nutrtion Support Algorithm Enteral vs  Parenteral   Question Answer Comment   Diet Type Cardiovascular    Cardiac Cardiac    Other Restriction(s): Sodium 2 GM    Other Restriction(s): Fluid Restriction 1800 ML    RD to adjust diet per protocol?  Yes        02/16/23 1242                  Active Medications  Scheduled Meds:  Current Facility-Administered Medications   Medication Dose Route Frequency Provider Last Rate   • aspirin  81 mg Oral Daily Wilder Aviles DO     • budesonide  0 5 mg Nebulization Q12H Wilder Aviles DO     • cefTRIAXone  1,000 mg Intravenous Q24H Sultana Meza MD 1,000 mg (02/16/23 0708)   • chlorhexidine  15 mL Mouth/Throat Q12H [address] Wilder Aviles DO     • enoxaparin  40 mg Subcutaneous Daily Wilder Aviles DO     • ergocalciferol  50,000 Units Oral Q28 Days Wilder Aviles DO     • escitalopram  5 mg Oral Daily Evon Mccormick DO     • fentanyl citrate (PF)  50 mcg Intravenous Q1H PRN Wilder Aviles DO     • fentanyl citrate (PF)  50 mcg Intravenous Once Sultana Meza MD     • ferrous sulfate  325 mg Oral Every Other Day Mega Galo MD     • formoterol  20 mcg Nebulization Q12H Wilder Aviles DO     • furosemide  20 mg Intravenous BID (diuretic) Mega Galo MD     • insulin lispro  2-12 Units Subcutaneous Q6H [address] [place], [place]     • levalbuterol  1 25 mg Nebulization TID Sultana Meza MD      And   • ipratropium  0 5 mg Nebulization TID Sultana Meza MD     • methylPREDNISolone sodium succinate  40 mg Intravenous [facility] Wilder Aviles DO     • omeprazole (PRILOSEC) suspension 2 mg/mL  20 mg Oral Daily Wilder Aviles DO     • polyethylene glycol  17 g Oral Daily PRN Brie Dang DO     • pravastatin  80 mg Oral Daily With Jerilyn Almeida DO     • senna-docusate sodium  1 tablet Oral HS Brie Dang, DO     • tamsulosin  0 4 mg Oral Daily With Dinner Evon Mccomrick DO       Continuous Infusions:     PRN Meds:   fentanyl citrate (PF), 50 mcg, Q1H PRN  polyethylene glycol, 17 g, Daily PRN        Allergies   No Known Allergies  ---------------------------------------------------------------------------------------  Advance Directive and Living Will:      Power of :    POLST: ---------------------------------------------------------------------------------------  Care Time Delivered:   Upon my evaluation, this patient had a high probability of imminent or life-threatening deterioration due to COPD exacerbation requirng mechanical ventillation, which required my direct attention, intervention, and personal management  I have personally provided 30 minute of critical care time, exclusive of procedures, teaching, family meetings, and any prior time recorded by providers other than myself  Ulises Piña DO      Portions of the record may have been created with voice recognition software  Occasional wrong word or "sound a like" substitutions may have occurred due to the inherent limitations of voice recognition software    Read the chart carefully and recognize, using context, where substitutions have occurred

## 2023-02-18 LAB
ANION GAP SERPL CALCULATED.3IONS-SCNC: 1 MMOL/L (ref 4–13)
BUN SERPL-MCNC: 28 MG/DL (ref 5–25)
CALCIUM SERPL-MCNC: 8.8 MG/DL (ref 8.3–10.1)
CHLORIDE SERPL-SCNC: 93 MMOL/L (ref 96–108)
CO2 SERPL-SCNC: 39 MMOL/L (ref 21–32)
CREAT SERPL-MCNC: 0.58 MG/DL (ref 0.6–1.3)
ERYTHROCYTE [DISTWIDTH] IN BLOOD BY AUTOMATED COUNT: 12.6 % (ref 11.6–15.1)
GFR SERPL CREATININE-BSD FRML MDRD: 106 ML/MIN/1.73SQ M
GLUCOSE SERPL-MCNC: 113 MG/DL (ref 65–140)
GLUCOSE SERPL-MCNC: 131 MG/DL (ref 65–140)
GLUCOSE SERPL-MCNC: 133 MG/DL (ref 65–140)
GLUCOSE SERPL-MCNC: 135 MG/DL (ref 65–140)
GLUCOSE SERPL-MCNC: 272 MG/DL (ref 65–140)
HCT VFR BLD AUTO: 27 % (ref 36.5–49.3)
HGB BLD-MCNC: 8 G/DL (ref 12–17)
MCH RBC QN AUTO: 30.8 PG (ref 26.8–34.3)
MCHC RBC AUTO-ENTMCNC: 29.6 G/DL (ref 31.4–37.4)
MCV RBC AUTO: 104 FL (ref 82–98)
PLATELET # BLD AUTO: 212 THOUSANDS/UL (ref 149–390)
PMV BLD AUTO: 10.5 FL (ref 8.9–12.7)
POTASSIUM SERPL-SCNC: 4 MMOL/L (ref 3.5–5.3)
RBC # BLD AUTO: 2.6 MILLION/UL (ref 3.88–5.62)
SODIUM SERPL-SCNC: 133 MMOL/L (ref 135–147)
WBC # BLD AUTO: 9.67 THOUSAND/UL (ref 4.31–10.16)

## 2023-02-18 RX ORDER — METOPROLOL SUCCINATE 25 MG/1
25 TABLET, EXTENDED RELEASE ORAL DAILY
Status: DISCONTINUED | OUTPATIENT
Start: 2023-02-18 | End: 2023-02-20

## 2023-02-18 RX ORDER — METHYLPREDNISOLONE SODIUM SUCCINATE 40 MG/ML
40 INJECTION, POWDER, LYOPHILIZED, FOR SOLUTION INTRAMUSCULAR; INTRAVENOUS EVERY 12 HOURS SCHEDULED
Status: COMPLETED | OUTPATIENT
Start: 2023-02-18 | End: 2023-02-20

## 2023-02-18 RX ADMIN — METHYLPREDNISOLONE SODIUM SUCCINATE 40 MG: 40 INJECTION, POWDER, FOR SOLUTION INTRAMUSCULAR; INTRAVENOUS at 05:54

## 2023-02-18 RX ADMIN — DICLOFENAC SODIUM 2 G: 10 GEL TOPICAL at 12:15

## 2023-02-18 RX ADMIN — METHYLPREDNISOLONE SODIUM SUCCINATE 40 MG: 40 INJECTION, POWDER, FOR SOLUTION INTRAMUSCULAR; INTRAVENOUS at 21:01

## 2023-02-18 RX ADMIN — CEFTRIAXONE 1000 MG: 1 INJECTION, POWDER, FOR SOLUTION INTRAMUSCULAR; INTRAVENOUS at 08:00

## 2023-02-18 RX ADMIN — DICLOFENAC SODIUM 2 G: 10 GEL TOPICAL at 21:01

## 2023-02-18 RX ADMIN — LEVALBUTEROL HYDROCHLORIDE 1.25 MG: 1.25 SOLUTION, CONCENTRATE RESPIRATORY (INHALATION) at 19:47

## 2023-02-18 RX ADMIN — LEVALBUTEROL HYDROCHLORIDE 1.25 MG: 1.25 SOLUTION, CONCENTRATE RESPIRATORY (INHALATION) at 08:25

## 2023-02-18 RX ADMIN — TAMSULOSIN HYDROCHLORIDE 0.4 MG: 0.4 CAPSULE ORAL at 17:07

## 2023-02-18 RX ADMIN — ACETAZOLAMIDE SODIUM 500 MG: 500 INJECTION, POWDER, LYOPHILIZED, FOR SOLUTION INTRAVENOUS at 08:00

## 2023-02-18 RX ADMIN — METOPROLOL SUCCINATE 25 MG: 25 TABLET, EXTENDED RELEASE ORAL at 12:23

## 2023-02-18 RX ADMIN — LEVALBUTEROL HYDROCHLORIDE 1.25 MG: 1.25 SOLUTION, CONCENTRATE RESPIRATORY (INHALATION) at 14:25

## 2023-02-18 RX ADMIN — Medication 20 MG: at 08:00

## 2023-02-18 RX ADMIN — DICLOFENAC SODIUM 2 G: 10 GEL TOPICAL at 08:02

## 2023-02-18 RX ADMIN — FORMOTEROL FUMARATE DIHYDRATE 20 MCG: 20 SOLUTION RESPIRATORY (INHALATION) at 08:25

## 2023-02-18 RX ADMIN — INSULIN LISPRO 3 UNITS: 100 INJECTION, SOLUTION INTRAVENOUS; SUBCUTANEOUS at 11:41

## 2023-02-18 RX ADMIN — SENNOSIDES AND DOCUSATE SODIUM 1 TABLET: 8.6; 5 TABLET ORAL at 21:01

## 2023-02-18 RX ADMIN — PRAVASTATIN SODIUM 80 MG: 20 TABLET ORAL at 17:07

## 2023-02-18 RX ADMIN — FORMOTEROL FUMARATE DIHYDRATE 20 MCG: 20 SOLUTION RESPIRATORY (INHALATION) at 19:47

## 2023-02-18 RX ADMIN — CHLORHEXIDINE GLUCONATE 0.12% ORAL RINSE 15 ML: 1.2 LIQUID ORAL at 21:01

## 2023-02-18 RX ADMIN — ASPIRIN 81 MG CHEWABLE TABLET 81 MG: 81 TABLET CHEWABLE at 08:01

## 2023-02-18 RX ADMIN — ENOXAPARIN SODIUM 40 MG: 40 INJECTION SUBCUTANEOUS at 08:01

## 2023-02-18 RX ADMIN — IPRATROPIUM BROMIDE 0.5 MG: 0.5 SOLUTION RESPIRATORY (INHALATION) at 08:25

## 2023-02-18 RX ADMIN — IPRATROPIUM BROMIDE 0.5 MG: 0.5 SOLUTION RESPIRATORY (INHALATION) at 14:25

## 2023-02-18 RX ADMIN — BUDESONIDE 0.5 MG: 0.5 INHALANT ORAL at 19:47

## 2023-02-18 RX ADMIN — IPRATROPIUM BROMIDE 0.5 MG: 0.5 SOLUTION RESPIRATORY (INHALATION) at 19:47

## 2023-02-18 RX ADMIN — BUDESONIDE 0.5 MG: 0.5 INHALANT ORAL at 08:25

## 2023-02-18 NOTE — PLAN OF CARE
Problem: SAFETY,RESTRAINT: NV/NON-SELF DESTRUCTIVE BEHAVIOR  Goal: Remains free of harm/injury (restraint for non violent/non self-detsructive behavior)  Description: INTERVENTIONS:  - Instruct patient/family regarding restraint use   - Assess and monitor physiologic and psychological status   - Provide interventions and comfort measures to meet assessed patient needs   - Identify and implement measures to help patient regain control  - Assess readiness for release of restraint   Outcome: Progressing  Goal: Returns to optimal restraint-free functioning  Description: INTERVENTIONS:  - Assess the patient's behavior and symptoms that indicate continued need for restraint  - Identify and implement measures to help patient regain control  - Assess readiness for release of restraint   Outcome: Progressing     Problem: MOBILITY - ADULT  Goal: Maintain or return to baseline ADL function  Description: INTERVENTIONS:  -  Assess patient's ability to carry out ADLs; assess patient's baseline for ADL function and identify physical deficits which impact ability to perform ADLs (bathing, care of mouth/teeth, toileting, grooming, dressing, etc )  - Assess/evaluate cause of self-care deficits   - Assess range of motion  - Assess patient's mobility; develop plan if impaired  - Assess patient's need for assistive devices and provide as appropriate  - Encourage maximum independence but intervene and supervise when necessary  - Involve family in performance of ADLs  - Assess for home care needs following discharge   - Consider OT consult to assist with ADL evaluation and planning for discharge  - Provide patient education as appropriate  Outcome: Progressing  Goal: Maintains/Returns to pre admission functional level  Description: INTERVENTIONS:  - Perform BMAT or MOVE assessment daily    - Set and communicate daily mobility goal to care team and patient/family/caregiver     - Collaborate with rehabilitation services on mobility goals if consulted  - Out of bed for toileting  - Record patient progress and toleration of activity level   Outcome: Progressing     Problem: PAIN - ADULT  Goal: Verbalizes/displays adequate comfort level or baseline comfort level  Description: Interventions:  - Encourage patient to monitor pain and request assistance  - Assess pain using appropriate pain scale  - Administer analgesics based on type and severity of pain and evaluate response  - Implement non-pharmacological measures as appropriate and evaluate response  - Consider cultural and social influences on pain and pain management  - Notify physician/advanced practitioner if interventions unsuccessful or patient reports new pain  Outcome: Progressing     Problem: INFECTION - ADULT  Goal: Absence or prevention of progression during hospitalization  Description: INTERVENTIONS:  - Assess and monitor for signs and symptoms of infection  - Monitor lab/diagnostic results  - Monitor all insertion sites, i e  indwelling lines, tubes, and drains  - Monitor endotracheal if appropriate and nasal secretions for changes in amount and color  - [place] appropriate cooling/warming therapies per order  - Administer medications as ordered  - Instruct and encourage patient and family to use good hand hygiene technique  - Identify and instruct in appropriate isolation precautions for identified infection/condition  Outcome: Progressing  Goal: Absence of fever/infection during neutropenic period  Description: INTERVENTIONS:  - Monitor WBC    Outcome: Progressing     Problem: SAFETY ADULT  Goal: Maintain or return to baseline ADL function  Description: INTERVENTIONS:  -  Assess patient's ability to carry out ADLs; assess patient's baseline for ADL function and identify physical deficits which impact ability to perform ADLs (bathing, care of mouth/teeth, toileting, grooming, dressing, etc )  - Assess/evaluate cause of self-care deficits   - Assess range of motion  - Assess patient's mobility; develop plan if impaired  - Assess patient's need for assistive devices and provide as appropriate  - Encourage maximum independence but intervene and supervise when necessary  - Involve family in performance of ADLs  - Assess for home care needs following discharge   - Consider OT consult to assist with ADL evaluation and planning for discharge  - Provide patient education as appropriate  Outcome: Progressing  Goal: Maintains/Returns to pre admission functional level  Description: INTERVENTIONS:  - Perform BMAT or MOVE assessment daily    - Set and communicate daily mobility goal to care team and patient/family/caregiver     - Collaborate with rehabilitation services on mobility goals if consulted  - Out of bed for toileting  - Record patient progress and toleration of activity level   Outcome: Progressing  Goal: Patient will remain free of falls  Description: INTERVENTIONS:  - Educate patient/family on patient safety including physical limitations  - Instruct patient to call for assistance with activity   - Consult OT/PT to assist with strengthening/mobility   - Keep Call bell within reach  - Keep bed low and locked with side rails adjusted as appropriate  - Keep care items and personal belongings within reach  - Initiate and maintain comfort rounds  - Make Fall Risk Sign visible to staff  - Apply yellow socks and bracelet for high fall risk patients  - Consider moving patient to room near nurses station  Outcome: Progressing     Problem: DISCHARGE PLANNING  Goal: Discharge to home or other facility with appropriate resources  Description: INTERVENTIONS:  - Identify barriers to discharge w/patient and caregiver  - Arrange for needed discharge resources and transportation as appropriate  - Identify discharge learning needs (meds, wound care, etc )  - Arrange for interpretive services to assist at discharge as needed  - Refer to Case Management Department for coordinating discharge planning if the patient needs post-hospital services based on physician/advanced practitioner order or complex needs related to functional status, cognitive ability, or social support system  Outcome: Progressing     Problem: Knowledge Deficit  Goal: Patient/family/caregiver demonstrates understanding of disease process, treatment plan, medications, and discharge instructions  Description: Complete learning assessment and assess knowledge base  Interventions:  - Provide teaching at level of understanding  - Provide teaching via preferred learning methods  Outcome: Progressing     Problem: Prexisting or High Potential for Compromised Skin Integrity  Goal: Skin integrity is maintained or improved  Description: INTERVENTIONS:  - Identify patients at risk for skin breakdown  - Assess and monitor skin integrity  - Assess and monitor nutrition and hydration status  - Monitor labs   - Assess for incontinence   - Turn and reposition patient  - Assist with mobility/ambulation  - Relieve pressure over bony prominences  - Avoid friction and shearing  - Provide appropriate hygiene as needed including keeping skin clean and dry  - Evaluate need for skin moisturizer/barrier cream  - Collaborate with interdisciplinary team   - Patient/family teaching  - Consider wound care consult   Outcome: Progressing     Problem: Nutrition/Hydration-ADULT  Goal: Nutrient/Hydration intake appropriate for improving, restoring or maintaining nutritional needs  Description: Monitor and assess patient's nutrition/hydration status for malnutrition  Collaborate with interdisciplinary team and initiate plan and interventions as ordered  Monitor patient's weight and dietary intake as ordered or per policy  Utilize nutrition screening tool and intervene as necessary  Determine patient's food preferences and provide high-protein, high-caloric foods as appropriate       INTERVENTIONS:  - Monitor oral intake, urinary output, labs, and treatment plans  - Assess nutrition and hydration status and recommend course of action  - Evaluate amount of meals eaten  - Assist patient with eating if necessary   - Allow adequate time for meals  - Recommend/ encourage appropriate diets, oral nutritional supplements, and vitamin/mineral supplements  - Order, calculate, and assess calorie counts as needed  - Recommend, monitor, and adjust tube feedings and TPN/PPN based on assessed needs  - Assess need for intravenous fluids  - Provide specific nutrition/hydration education as appropriate  - Include patient/family/caregiver in decisions related to nutrition  Outcome: Progressing

## 2023-02-18 NOTE — PROGRESS NOTES
Spiritual Care Progress Note    2023  Patient: Braeden Lopez  : 1955  Admission Date & Time: 2023 0507  MRN: [id number] [facility]: [id number]      [facility] visited with pt from a referral  Pt was resting comfortably in the chair with his wife, Karrie Easley, and son, Huber Ferreira, sitting at the beside  , pt, and family briefly chatted about how the pt was currently feeling  Pt seems to be in better spirits and feeling better on the whole  Pt was able to participate as was Karrie Easley and Huber Kidney  Pt and family seemed to "be ok"   provided an empathetic and listening ear   offered support and support was declined at this time   offered to hold pt and family in prayer and family expressed gratitude  Chaplains remain available                 Chaplaincy Interventions Utilized:   Empowerment: Clarified, confirmed, or reviewed information from treatment team  and Provided chaplaincy education    Exploration: Explored emotional needs & resources, Explored relational needs & resources, and Explored spiritual needs & resources    Collaboration: Facilitated respect for spiritual/cultural practice during hospitalization    Relationship Building: Cultivated a relationship of care and support    Ritual: Celebrated with patient/family    Chaplaincy Outcomes Achieved:  Declined  support    Spiritual Coping Strategies Utilized:   Connectedness and Spiritual comfort       [id number]   Clinical Encounter Type   Visited With Patient and family together   Routine Visit Follow-up   Referral From    Referral To    Patient Spiritual Encounters   Suffering Severity 3

## 2023-02-18 NOTE — PROGRESS NOTES
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%), heart failure with reduced ejection fraction of 40%, multiple pulmonary nodules, protein calorie malnutrition, hyperlipidemia, GERD, prediabetes, osteoarthritis, osteoporosis, vitamin D deficiency, chronic hypoxemic and hypercapnic respiratory failure, BPH, prostate cancer, and former tobacco abuse (105 pack years, quit in 2012) that initially presented to [facility] on 2/12 for shortness of breath  It has been worsening over the past 2 days prior to presentation, also becoming more drowsy than baseline and falling asleep in his wheelchair  Patiently, he had been hallucinating  At that point, his wife called 46, and when EMS arrived he was intubated in the field  No recent illnesses  Since admission, the patient was found to have elevated Pro-Jorge Alberto and started on 3-day course of azithromycin and 7-day course of ceftriaxone for community-acquired pneumonia  In addition, he required aggressive diuresis with IV Lasix with good urine output  He was able to be weaned off the ventilator and extubated on 2/15  Maintained on BiPAP overnight, along with 5 to 6 L of oxygen during the day entheses baseline 3 L of oxygen at home)  He has also been receiving scheduled nebulizers with aggressive pulmonary hygiene  Yesterday, the patient was signed out to the general medical floors, but has remained in the MICU due to bed holds  No acute events overnight  Patient remained stable for transfer  He tolerated BiPAP well      BiPAP @ 12/6/40%  Has 2 peripheral lines    ---------------------------------------------------------------------------------------  SUBJECTIVE  Patient was seen and examined this morning  No complaints, other than he would like to get off BiPAP  Review of systems negative  Review of Systems   Constitutional: Negative for chills and fatigue  HENT: Negative for congestion, sinus pressure, sinus pain and sneezing  Eyes: Negative for pain  Respiratory: Negative for cough and shortness of breath  Gastrointestinal: Negative for abdominal distention, abdominal pain and diarrhea  Genitourinary: Negative for dysuria  Musculoskeletal: Negative for back pain  Skin: Negative for rash  Neurological: Negative for dizziness and headaches  Psychiatric/Behavioral: Negative for agitation and confusion  Review of systems was reviewed and negative unless stated above in HPI/24-hour events   ---------------------------------------------------------------------------------------  Assessment and Plan:    #Endstage COPD, FEV1 22%, in acute exacerbation  #Acute on chronic HFrEF 40%  #Anemia  #Pulmonary nodules     Plan:     Neuro:   • RASS 0; goal 0 to -1  CPOT>2  • No longer on sedation  • Successful extubation 2/15  • Delirium precautions, OOB to chair as able     CV:   • Diagnosis: HFrEF  • NYHA class: III  / ACC/AHA stage: C; in setting of very severe COPD (FEV1 22%)  • Follows with Dr Jose Ramon Nelson as an outpatient  • EF 40% PTA on lasix 20mg qd at home  • TTE on admission showing EF 20-25% with abnormal septal motion in setting of chronic LBBB, RVSP 38, mildly dilated RV  • Clinical presentation on arrival consistent with biventricular heart failure in setting of AECOPD  • Diuretics:  ? S/P one-time dose 40 mg IV lasix on 2/12 --> IV lasix 20mg BID with good urine output  ? Diuretics being held in setting of contraction alkalosis and to prevent further compensatory respiratory acidosis    ? Initiated Diamox 500mg BID given elevated bicarb on diuresis   ?  Plan to transition to home dosed PO lasix 20mg qd 2/19  • Neurohormonal Blockade / GDMT:  - Beta blocker: lmited d/t soft BP, also concern with end-stage COPD   - ACEi/ARB/ARNI: can initiate if BP within appropriate paramaters  - Aldosterone antagonist:  jnone  - SGLT2i: cannot afford expensive meds  • Sudden cardiac death risk reduction:  ? ICD/Life Vest candidacy: can be a candidate if repeat TTE shows EF<35%  ? Of note, patient has known hx of LBBB with QRS around 120ms  • Cardiac diet, sodium restriction <2g, fluid restriction <1 5L  • Daily I&Os  • Consider repeat TTE in future to assess for improvement in systolic function  • MAP goal >65   • Diagnosis: Nonobstructive CAD  ? Coronary calcification seen on prior CT  ? Continue with ASCVD prophylaxis with ASA and statin   • Diagnosis: Nonischemic Cardiomayopathy, known history  • Diagnosis: Chronic LBBB  ? Chronic LBBB likely contributing to nonischemic cardiomyopathy  ? Clarissa josefina LeeDamir cardiology as an outpatient     Pulm:  • Diagnosis: Acute on chronic hypoxic respiratory failure   • Diagnosis: End stage COPD (FEV1 22%), in acute exacerbation  • Follows with pulm as an outpatient  • Last FEV1 22%, consistent with very-severe COPD  • Home regiment:  • 3L O2 at home  • All nebs- Duo-Neb/Pulmicort/Perforomist  • Albuterol rescue inhaler  • Daily prednisone 5mg  • Multiple prior exacerbations requiring admission  • Intubated 2/12, extubated 2/15  • Intermittently requiring MF NC  Currently on 5-6L O2   • Wean O2 as able  • Maintain O2 sats between 88-92%  • Continue bronchodilators-- scheduled Xopenx/Atrovent QID, perforomist/pulmicort BID  • Continue Solu-medrol 40 mg TID  • Aggressive pulmonary hygiene- use of incentive spirometry, VEST/airway clearance, OOB to chair as able  • Palliative consulted, appreciate recommendations  ? Goals of care discussed with patient with wife at bedside, patient to remain level 3 DNR/DNI   • Diagnosis: KEVIN and COPD overlap  ? Known hx, uses BiPAP at home  ?  Continue BiPAP 12/6/40% qHs  GI:   • Diagnosis: GERD, known history  • Continue stress ulcer prophylaxis in setting of concern for prolonged intubation     :   • No active issues  • Tyler placemed for accurate measurements of I&Os while attempting diuresis  • Plan for possible tyler removal today      F/E/N:   • Avoid additional IVF d/t decompensated heart failure  • Diagnosis: Hyponatremia, resolved  ? Na <135 initially, serum osm low at 276, patient hypervolemic on exam, urine sodium low <5, patient hypervolemic on exam, concerning hypervolemic hyponatremia  ? Etiology likely HF exacerbation vs hypoalbuminemia   ? S/P albumin 5% 25g 2/14, which improved sodium to 132  ? Suspect lack of improvement likely due to free water flushes from tube feeds  ? Na normalized after free water flushes held and was continued on diuresis  • Monitor electrolytes; goal K>4, Phos>3, Mag>2  • Cardiac diet; sodium restriction <2g, fluid restriction <1 5L     Heme/Onc:   • Diagnosis: Pulmonary nodules  ? Stable on imaging  ? Serial monitoring outpatient  • Diagnosis: Documented history of prostate cancer  • Diagnosis: Anemia  ? Hgb continues downward trend, though stable in 7's  No overt signs of GI bleed  ? Iron panel suggesting mixed iron deficiency vs  anemia of chronic disease  ? Retic index   74 suggesting hypoproliferation  ? Heme occult pending  ? Continue ferrous sulfate supplementation   • Continue DVT ppx     Endo:   • Fasting  this AM, receiving 2U subq SSI, at goal  • A1c 5 4  • On SSI for BS control  • BS Goal 140-180     ID:   • Meets SIRS criteria on admission 2/12, procalcitonin elevated on admission  • Procalc now normalized 2/16  • S/P 3-day course of azithromycin   • Continue ceftriaxone 1g, day 6/7  • Trend CBC and fever curve     MSK/Skin:   • Pressure ulcer ppx  • UOOB and early mobilization     Patient appropriate for transfer out of the ICU today?: Patient does not meet criteria for ICU Follow-up Clinic; referral has not been made     Disposition: Transfer to Med-Surg   Code Status: Level 3 - DNAR and DNI  ---------------------------------------------------------------------------------------  ICU CORE MEASURES    Prophylaxis   VTE Pharmacologic Prophylaxis: Enoxaparin (Lovenox)  VTE Mechanical Prophylaxis: sequential compression device  Stress Ulcer Prophylaxis: Omeprazole PO    ABCDE Protocol (if indicated)  Plan to perform spontaneous awakening trial today? Not applicable  Plan to perform spontaneous breathing trial today? Not applicable  Obvious barriers to extubation? Not applicable  CAM-ICU: Negative    Invasive Devices Review  Invasive Devices     Peripheral Intravenous Line  Duration           Peripheral IV 23 Right;Ventral (anterior) Forearm 1 day    Peripheral IV 23 Left;Ventral (anterior) Forearm <1 day              Can any invasive devices be discontinued today? No  ---------------------------------------------------------------------------------------  OBJECTIVE    Vitals   Vitals:    23 2115 23 2200 23 2300 23 0415   BP:    95/65   BP Location:       Pulse:  84 80 72   Resp:  19 19 18   Temp:    98 2 °F (36 8 °C)   TempSrc:    Axillary   SpO2: 92% 100% 100% 100%   Weight:       Height:         Temp (24hrs), Av 2 °F (36 8 °C), Min:98 1 °F (36 7 °C), Max:98 2 °F (36 8 °C)  Current: Temperature: 98 2 °F (36 8 °C)  HR: 72  BP: 95/65  RR: 18  SpO2: 96%    Respiratory:  SpO2: SpO2: 96 %, SpO2 Activity: SpO2 Activity: At Rest, SpO2 Device: O2 Device: Mid flow nasal cannula, Capnography:    Nasal Cannula O2 Flow Rate (L/min): 7 L/min    Invasive/non-invasive ventilation settings   Respiratory    Lab Data (Last 4 hours)    None         O2/Vent Data (Last 4 hours)    None                Physical Exam  Vitals reviewed  HENT:      Head: Normocephalic and atraumatic  Right Ear: External ear normal       Left Ear: External ear normal       Nose: Nose normal       Mouth/Throat:      Mouth: Mucous membranes are moist       Pharynx: Oropharynx is clear        Comments: BiPAP  Eyes:      Extraocular Movements: Extraocular movements intact  Pupils: Pupils are equal, round, and reactive to light  Cardiovascular:      Rate and Rhythm: Normal rate  Pulses: Normal pulses  Pulmonary:      Breath sounds: Rhonchi present  Comments: Decreased breath sounds throughout  Abdominal:      General: Abdomen is flat  Bowel sounds are normal  There is no distension  Tenderness: There is no abdominal tenderness  Musculoskeletal:      Cervical back: Normal range of motion  Right lower leg: No edema  Left lower leg: No edema  Skin:     General: Skin is warm  Capillary Refill: Capillary refill takes less than 2 seconds  Neurological:      General: No focal deficit present  Mental Status: He is alert and oriented to person, place, and time     Psychiatric:         Mood and Affect: Mood normal          Behavior: Behavior normal              Laboratory and Diagnostics:  Results from last 7 days   Lab Units 02/18/23  0551 02/17/23  0436 02/16/23  0447 02/15/23  0507 02/14/23  0517 02/13/23  0429 02/12/23  0759 02/12/23  0529   WBC Thousand/uL 9 67 8 41 10 56* 9 97 7 51 10 91*  --  7 24   HEMOGLOBIN g/dL 8 0* 7 2* 7 3* 7 2* 7 4* 8 1*  --  8 9*   HEMATOCRIT % 27 0* 23 6* 23 5* 22 5* 23 0* 25 7*  --  28 5*   PLATELETS Thousands/uL 212 188 185 179 179 208 220 204   NEUTROS PCT %  --   --  91* 89* 91* 87*  --  73   MONOS PCT %  --   --  6 9 6 10  --  17*     Results from last 7 days   Lab Units 02/18/23  0551 02/17/23  0436 02/16/23  0447 02/15/23  0507 02/14/23  0517 02/13/23  0429 02/12/23  0529   SODIUM mmol/L 133* 135 135 130* 132* 129* 129*   POTASSIUM mmol/L 4 0 3 7 3 8 3 5 3 7 3 5 4 0   CHLORIDE mmol/L 93* 91* 92* 88* 88* 85* 86*   CO2 mmol/L 39* 44* 42* 40* 38* 38* 39*   ANION GAP mmol/L 1* 0* 1* 2* 6 6 4   BUN mg/dL 28* 26* 30* 38* 31* 20 20   CREATININE mg/dL 0 58* 0 37* 0 37* 0 45* 0 51* 0 51* 0 56*   CALCIUM mg/dL 8 8 8 5 8 7 8 2* 8 2* 8 2* 8 2* GLUCOSE RANDOM mg/dL 135 132 141* 276* 204* 139 114   ALT U/L  --   --  29 24 23 22 24   AST U/L  --   --  35 32 38 44 39   ALK PHOS U/L  --   --  44* 49 44* 53 64   ALBUMIN g/dL  --   --  3 1* 3 0* 3 2* 2 9* 2 9*   TOTAL BILIRUBIN mg/dL  --   --  0 59 0 48 0 53 0 63 0 51     Results from last 7 days   Lab Units 02/17/23  0436 02/16/23  0447 02/15/23  0507 02/14/23  0517 02/13/23  0429   MAGNESIUM mg/dL 2 1 2 2 2 2 2 2 2 0   PHOSPHORUS mg/dL 3 0 2 8 2 5 3 7 3 1      Results from last 7 days   Lab Units 02/12/23  0529   INR  0 93   PTT seconds 27          Results from last 7 days   Lab Units 02/12/23  0529   LACTIC ACID mmol/L 1 2     ABG:  Results from last 7 days   Lab Units 02/16/23  1014   PH ART  7 395   PCO2 ART mm Hg 65 8*   PO2 ART mm Hg 136 0*   HCO3 ART mmol/L 39 4*   BASE EXC ART mmol/L 12 8   ABG SOURCE  Line, Arterial     VBG:  Results from last 7 days   Lab Units 02/16/23  1014 02/13/23  1304 02/13/23  1204   PH JHONATHAN   --   --  7 458*   PCO2 JHONATHAN mm Hg  --   --  57 5*   PO2 JHONATHAN mm Hg  --   --  39 7   HCO3 JHONATHAN mmol/L  --   --  39 8*   BASE EXC JHONATHAN mmol/L  --   --  14 1   ABG SOURCE  Line, Arterial   < >  --     < > = values in this interval not displayed  Results from last 7 days   Lab Units 02/17/23  0436 02/13/23  0429 02/12/23  0529   PROCALCITONIN ng/ml 0 09 0 87* 0 05       Micro  Results from last 7 days   Lab Units 02/13/23  0859 02/12/23  0834 02/12/23  0803 02/12/23  0529   BLOOD CULTURE   --   --   --  No Growth After 5 Days  No Growth After 5 Days  SPUTUM CULTURE   --  3+ Growth of  --   --    GRAM STAIN RESULT   --  2+ Gram positive cocci in pairs*  2+ Gram negative rods*  Rare Polys*  --   --    URINE CULTURE  <10,000 cfu/ml  --   --   --    LEGIONELLA URINARY ANTIGEN   --   --  Negative  --    STREP PNEUMONIAE ANTIGEN, URINE   --   --  Negative  --        EKG: NSR  Imaging: I have personally reviewed pertinent reports        Intake and Output  I/O       02/16 0701 02/17 0700 02/17 0701  02/18 0700 02/18 0701  02/19 0700    I V  (mL/kg) 50 (0 9)      NG/GT       IV Piggyback 100      Feedings       Total Intake(mL/kg) 150 (2 6)      Urine (mL/kg/hr) 2885 (2 1) 1550 (1 1)     Emesis/NG output       Stool 0      Total Output 2885 1550     Net -2735 -1550            Unmeasured Stool Occurrence 1 x          UOP: 1 1 ml/kg/hr     Height and Weights   Height: 5' (152 4 cm)  IBW (Ideal Body Weight): 50 kg  Body mass index is 25 02 kg/m²  Weight (last 2 days)     Date/Time Weight    02/17/23 0600 58 1 (128 09)            Nutrition       Diet Orders   (From admission, onward)             Start     Ordered    02/17/23 1044  Dietary nutrition supplements  Once        Question Answer Comment   Select Supplement: Ensure Max - Milk Chocolate    Frequency Breakfast, Lunch, Dinner        02/17/23 1043    02/16/23 1241  Diet Cardiovascular; Cardiac; Sodium 2 GM, Fluid Restriction 1800 ML  Diet effective now        References:    Nutrtion Support Algorithm Enteral vs  Parenteral   Question Answer Comment   Diet Type Cardiovascular    Cardiac Cardiac    Other Restriction(s): Sodium 2 GM    Other Restriction(s): Fluid Restriction 1800 ML    RD to adjust diet per protocol?  Yes        02/16/23 1242                    Active Medications  Scheduled Meds:  Current Facility-Administered Medications   Medication Dose Route Frequency Provider Last Rate   • acetaZOLAMIDE  500 mg Intravenous Q12H [address] Kamron Ye, DO     • aspirin  81 mg Oral Daily Jessica Randolph, DO     • budesonide  0 5 mg Nebulization Q12H Jessica Randolph, DO     • cefTRIAXone  1,000 mg Intravenous Q24H Essence Ye DO 1,000 mg (02/18/23 0800)   • chlorhexidine  15 mL Mouth/Throat Q12H [address] Jessica Alu, DO     • Diclofenac Sodium  2 g Topical 4x Daily Laly Hotelgin, DO     • enoxaparin  40 mg Subcutaneous Daily Jessica Alu, DO     • ergocalciferol  50,000 Units Oral Q28 Days Jessica Randolph, DO     • escitalopram 5 mg Oral Daily Valeta Ahsand, DO     • fentanyl citrate (PF)  50 mcg Intravenous Q1H PRN Alyssagabriela Pritchard, DO     • fentanyl citrate (PF)  50 mcg Intravenous Once Tracie Rush MD     • ferrous sulfate  325 mg Oral Every Other Day Maya Ceron MD     • formoterol  20 mcg Nebulization Q12H Alyssa Bame, DO     • [START ON 2/19/2023] furosemide  20 mg Oral Daily Valeta Brod, DO     • insulin lispro  1-5 Units Subcutaneous TID AC Jg Orf, DO     • insulin lispro  1-5 Units Subcutaneous HS Jg Orf, DO     • levalbuterol  1 25 mg Nebulization TID Tracie Rush MD      And   • ipratropium  0 5 mg Nebulization TID Tracie Rush MD     • methylPREDNISolone sodium succinate  40 mg Intravenous [facility] Alyssa Bame, DO     • omeprazole (PRILOSEC) suspension 2 mg/mL  20 mg Oral Daily Alyssa Maximilianoe, DO     • polyethylene glycol  17 g Oral Daily PRN Jg Orf, DO     • pravastatin  80 mg Oral Daily With Jerilyn [address], DO     • senna-docusate sodium  1 tablet Oral HS Jg Orf, DO     • sodium chloride  2 spray Each Nare Q1H PRN Kandis Batres, DO     • tamsulosin  0 4 mg Oral Daily With [address] [facility] [address] [facility], DO       Continuous Infusions:     PRN Meds:   fentanyl citrate (PF), 50 mcg, Q1H PRN  polyethylene glycol, 17 g, Daily PRN  sodium chloride, 2 spray, Q1H PRN        Allergies   No Known Allergies  ---------------------------------------------------------------------------------------  Advance Directive and Living Will:      Power of :    POLST:    ---------------------------------------------------------------------------------------  Care Time Delivered:   [facility],       Portions of the record may have been created with voice recognition software  Occasional wrong word or "sound a like" substitutions may have occurred due to the inherent limitations of voice recognition software    Read the chart carefully and recognize, using context, where substitutions have occurred

## 2023-02-18 NOTE — CONSULTS
Patient has been seen in MICU   Please see daily pulmonary progress notes starting 2/19/2023 once he is transferred out of MICU    Herrera Bryant DO

## 2023-02-19 PROBLEM — R65.10 SIRS (SYSTEMIC INFLAMMATORY RESPONSE SYNDROME) (HCC): Status: RESOLVED | Noted: 2023-02-17 | Resolved: 2023-02-19

## 2023-02-19 LAB
ANION GAP SERPL CALCULATED.3IONS-SCNC: 1 MMOL/L (ref 4–13)
ATRIAL RATE: 99 BPM
BUN SERPL-MCNC: 28 MG/DL (ref 5–25)
CALCIUM SERPL-MCNC: 9 MG/DL (ref 8.3–10.1)
CHLORIDE SERPL-SCNC: 94 MMOL/L (ref 96–108)
CO2 SERPL-SCNC: 39 MMOL/L (ref 21–32)
CREAT SERPL-MCNC: 0.53 MG/DL (ref 0.6–1.3)
ERYTHROCYTE [DISTWIDTH] IN BLOOD BY AUTOMATED COUNT: 12.6 % (ref 11.6–15.1)
GFR SERPL CREATININE-BSD FRML MDRD: 111 ML/MIN/1.73SQ M
GLUCOSE SERPL-MCNC: 145 MG/DL (ref 65–140)
GLUCOSE SERPL-MCNC: 158 MG/DL (ref 65–140)
GLUCOSE SERPL-MCNC: 169 MG/DL (ref 65–140)
GLUCOSE SERPL-MCNC: 171 MG/DL (ref 65–140)
GLUCOSE SERPL-MCNC: 204 MG/DL (ref 65–140)
HCT VFR BLD AUTO: 28.8 % (ref 36.5–49.3)
HGB BLD-MCNC: 8.4 G/DL (ref 12–17)
MCH RBC QN AUTO: 31 PG (ref 26.8–34.3)
MCHC RBC AUTO-ENTMCNC: 29.2 G/DL (ref 31.4–37.4)
MCV RBC AUTO: 106 FL (ref 82–98)
P AXIS: 74 DEGREES
PLATELET # BLD AUTO: 239 THOUSANDS/UL (ref 149–390)
PMV BLD AUTO: 9.8 FL (ref 8.9–12.7)
POTASSIUM SERPL-SCNC: 4.4 MMOL/L (ref 3.5–5.3)
PR INTERVAL: 158 MS
QRS AXIS: 53 DEGREES
QRSD INTERVAL: 129 MS
QT INTERVAL: 350 MS
QTC INTERVAL: 450 MS
RBC # BLD AUTO: 2.71 MILLION/UL (ref 3.88–5.62)
SODIUM SERPL-SCNC: 134 MMOL/L (ref 135–147)
T WAVE AXIS: 176 DEGREES
VENTRICULAR RATE: 99 BPM
WBC # BLD AUTO: 12.34 THOUSAND/UL (ref 4.31–10.16)

## 2023-02-19 RX ADMIN — INSULIN LISPRO 1 UNITS: 100 INJECTION, SOLUTION INTRAVENOUS; SUBCUTANEOUS at 11:40

## 2023-02-19 RX ADMIN — CHLORHEXIDINE GLUCONATE 0.12% ORAL RINSE 15 ML: 1.2 LIQUID ORAL at 21:55

## 2023-02-19 RX ADMIN — TAMSULOSIN HYDROCHLORIDE 0.4 MG: 0.4 CAPSULE ORAL at 17:44

## 2023-02-19 RX ADMIN — IPRATROPIUM BROMIDE 0.5 MG: 0.5 SOLUTION RESPIRATORY (INHALATION) at 13:48

## 2023-02-19 RX ADMIN — BUDESONIDE 0.5 MG: 0.5 INHALANT ORAL at 07:13

## 2023-02-19 RX ADMIN — ESCITALOPRAM OXALATE 5 MG: 5 TABLET, FILM COATED ORAL at 08:38

## 2023-02-19 RX ADMIN — FERROUS SULFATE TAB 325 MG (65 MG ELEMENTAL FE) 325 MG: 325 (65 FE) TAB at 08:38

## 2023-02-19 RX ADMIN — FUROSEMIDE 20 MG: 20 TABLET ORAL at 08:38

## 2023-02-19 RX ADMIN — BUDESONIDE 0.5 MG: 0.5 INHALANT ORAL at 19:10

## 2023-02-19 RX ADMIN — FORMOTEROL FUMARATE DIHYDRATE 20 MCG: 20 SOLUTION RESPIRATORY (INHALATION) at 07:13

## 2023-02-19 RX ADMIN — LEVALBUTEROL HYDROCHLORIDE 1.25 MG: 1.25 SOLUTION, CONCENTRATE RESPIRATORY (INHALATION) at 13:48

## 2023-02-19 RX ADMIN — INSULIN LISPRO 1 UNITS: 100 INJECTION, SOLUTION INTRAVENOUS; SUBCUTANEOUS at 07:18

## 2023-02-19 RX ADMIN — METHYLPREDNISOLONE SODIUM SUCCINATE 40 MG: 40 INJECTION, POWDER, FOR SOLUTION INTRAMUSCULAR; INTRAVENOUS at 08:38

## 2023-02-19 RX ADMIN — LEVALBUTEROL HYDROCHLORIDE 1.25 MG: 1.25 SOLUTION, CONCENTRATE RESPIRATORY (INHALATION) at 19:10

## 2023-02-19 RX ADMIN — ASPIRIN 81 MG CHEWABLE TABLET 81 MG: 81 TABLET CHEWABLE at 08:37

## 2023-02-19 RX ADMIN — INSULIN LISPRO 1 UNITS: 100 INJECTION, SOLUTION INTRAVENOUS; SUBCUTANEOUS at 21:55

## 2023-02-19 RX ADMIN — PRAVASTATIN SODIUM 80 MG: 20 TABLET ORAL at 17:44

## 2023-02-19 RX ADMIN — METHYLPREDNISOLONE SODIUM SUCCINATE 40 MG: 40 INJECTION, POWDER, FOR SOLUTION INTRAMUSCULAR; INTRAVENOUS at 21:55

## 2023-02-19 RX ADMIN — IPRATROPIUM BROMIDE 0.5 MG: 0.5 SOLUTION RESPIRATORY (INHALATION) at 07:13

## 2023-02-19 RX ADMIN — ENOXAPARIN SODIUM 40 MG: 40 INJECTION SUBCUTANEOUS at 08:38

## 2023-02-19 RX ADMIN — Medication 20 MG: at 08:38

## 2023-02-19 RX ADMIN — CHLORHEXIDINE GLUCONATE 0.12% ORAL RINSE 15 ML: 1.2 LIQUID ORAL at 08:37

## 2023-02-19 RX ADMIN — FORMOTEROL FUMARATE DIHYDRATE 20 MCG: 20 SOLUTION RESPIRATORY (INHALATION) at 19:10

## 2023-02-19 RX ADMIN — IPRATROPIUM BROMIDE 0.5 MG: 0.5 SOLUTION RESPIRATORY (INHALATION) at 19:10

## 2023-02-19 RX ADMIN — LEVALBUTEROL HYDROCHLORIDE 1.25 MG: 1.25 SOLUTION, CONCENTRATE RESPIRATORY (INHALATION) at 07:13

## 2023-02-19 RX ADMIN — DICLOFENAC SODIUM 2 G: 10 GEL TOPICAL at 08:51

## 2023-02-19 NOTE — PLAN OF CARE
Problem: PHYSICAL THERAPY ADULT  Goal: Performs mobility at highest level of function for planned discharge setting  See evaluation for individualized goals  Description: Treatment/Interventions: Functional transfer training, LE strengthening/ROM, Elevations, Therapeutic exercise, Endurance training, Equipment eval/education, Bed mobility, Gait training, Spoke to nursing, Family, Patient/family training  Equipment Recommended: Dorothy Smith (at this time)       See flowsheet documentation for full assessment, interventions and recommendations  Note: Prognosis: Fair  Problem List: Decreased strength, Decreased endurance, Impaired balance, Decreased mobility, Decreased cognition, Decreased safety awareness, Impaired hearing  Assessment: Pt is 74 y o  male admitted with hx of SOB and Dx of Acute/chronic hypoxic/hypercarbic respiratory failure, Severe COPD with exacerbation  Pt 's comorbidities affecting POC include: Acute metabolic encephalopathy, Arthritis, Cardiomyopathy ([facility] [address] ), COPD (chronic obstructive pulmonary disease) ([facility]), CPAP (continuous positive airway pressure) dependence, Left bundle branch block, Sleep apnea, and Smoker and personal factors of: CARLO  Pt's clinical presentation is currently  unstable/unpredictable which is evident in ongoing telem monitoring, abn lab values, and inability to progress further w/ mobilization due to fatigue requiring mod (A) for transfers and amb w/ rw  Pt presents w/ overall weakness, incl decreased LE strength, decreased functional endurance and activity tolerance, impaired balance, gait deviations and fall risk  Will cont to follow pt in PT for progressive mobilization to address above functional deficits and to max level of (I), endurance, and safety  Currently, overall observed mobility status is mod below premorbid level (also per family), recommend  rehab upon D/C  Will cont to follow until then    Barriers to Discharge: Inaccessible home environment     PT Discharge Recommendation: Post acute rehabilitation services    See flowsheet documentation for full assessment

## 2023-02-19 NOTE — PROGRESS NOTES
Progress Note - Pulmonary   Leartis Quicksburg  74 y o  male MRN: [id number]  Unit/Bed#: [facility] 428-01 Encounter: [id number]    Assessment:  Acute on Chronic hypoxic and hypercapniec respiratory failure  Severe COPD with exacerbation  Pulmonary cachexia  Heart failure reduced ejection fraction    Plan:  Patient seems to be back to his euvolemic state, agree with home Lasix dose  From a COPD standpoint patient seems to be improving we will continue IV Solu-Medrol today consider de-escalating dose over the week continue all nebulized solution with budesonide Perforomist Xopenex and Atrovent  Patient needs social support and physical therapy need to assure that he has a trilogy machine at home as his BiPAP was removed    Chief Complaint:   I feel better    Subjective:   Patient feels better looks better at the bedside less wheezing and shortness of breath  He expresses remorse and does not want to go back on the ventilator, he feels his ICU stay extended suffering and does not want invasive measures    Objective:     Vitals: Blood pressure 96/55, pulse 75, temperature 98 5 °F (36 9 °C), temperature source Oral, resp  rate 14, height 5' (1 524 m), weight 52 kg (114 lb 10 2 oz), SpO2 98 %  ,Body mass index is 22 39 kg/m²        Intake/Output Summary (Last 24 hours) at 2/19/2023 0754  Last data filed at 2/19/2023 0401  Gross per 24 hour   Intake --   Output 1025 ml   Net -1025 ml       Invasive Devices     Peripheral Intravenous Line  Duration           Peripheral IV 02/17/23 Right;Ventral (anterior) Forearm 2 days    Peripheral IV 02/17/23 Left;Ventral (anterior) Forearm 1 day                Physical Exam: BP 96/55 (BP Location: Left arm)   Pulse 75   Temp 98 5 °F (36 9 °C) (Oral)   Resp 14   Ht 5' (1 524 m)   Wt 52 kg (114 lb 10 2 oz)   SpO2 98%   BMI 22 39 kg/m²   General appearance: alert and oriented, in no acute distress  Neck: no adenopathy, no carotid bruit, no JVD, supple, symmetrical, trachea midline and thyroid not enlarged, symmetric, no tenderness/mass/nodules  Lungs: clear to auscultation bilaterally  Heart: regular rate and rhythm, S1, S2 normal, no murmur, click, rub or gallop  Abdomen: soft, non-tender; bowel sounds normal; no masses,  no organomegaly  Extremities: extremities normal, warm and well-perfused; no cyanosis, clubbing, or edema  Lymph nodes: Cervical, supraclavicular, and axillary nodes normal   Neurologic: Grossly normal     Labs:   BNP: No results found for: BNP, CBC:   Lab Results   Component Value Date    WBC 12 34 (H) 02/19/2023    HGB 8 4 (L) 02/19/2023    HCT 28 8 (L) 02/19/2023     (H) 02/19/2023     02/19/2023    MCH 31 0 02/19/2023    MCHC 29 2 (L) 02/19/2023    RDW 12 6 02/19/2023    MPV 9 8 02/19/2023   , CMP:   Lab Results   Component Value Date    SODIUM 134 (L) 02/19/2023    K 4 4 02/19/2023    CL 94 (L) 02/19/2023    CO2 39 (H) 02/19/2023    BUN 28 (H) 02/19/2023    CREATININE 0 53 (L) 02/19/2023    CALCIUM 9 0 02/19/2023    EGFR 111 02/19/2023     Imaging and other studies: I have personally reviewed pertinent films in PACS

## 2023-02-19 NOTE — PROGRESS NOTES
[address] [facility]  Progress Note - [place]  1955, 74 y o  male MRN: [id number]  Unit/Bed#: [facility] 428-01 Encounter: [id number]  Primary Care Provider: Radha Reyes DO   Date and time admitted to hospital: 2/12/2023  5:07 AM    * Chronic obstructive pulmonary disease ([address] )  Assessment & Plan  · Follows with pulm as an outpatient  · Last FEV1 22%, consistent with very-severe COPD  · Home regiment:  · 3L O2 at home  · All nebs- Duo-Neb/Pulmicort/Perforomist  · Albuterol rescue inhaler  · Daily prednisone 5mg  · Multiple prior exacerbations requiring admission  · Intubated 2/12, extubated 2/15    · Currently requiring 3 to 4 L supplemental O2  · Wean O2 as able  · Maintain O2 sats between 88-92%  · Continue bronchodilators-- scheduled Xopenx/Atrovent QID, perforomist/pulmicort BID  · Continue Solu-medrol 40 mg TID; will need to wean over the next week or so  · Aggressive pulmonary hygiene- use of incentive spirometry, VEST/airway clearance, OOB to chair as able  · Palliative consulted, appreciate recommendations  · Goals of care discussed with patient with wife at bedside, patient to remain level 3 DNR/DNI       Acute on chronic HFrEF (heart failure with reduced ejection fraction) (HCC)  Assessment & Plan  · NYHA class: III  / ACC/AHA stage: C; in setting of very severe COPD (FEV1 22%)  · Follows with Dr Haleigh Goncalves as an outpatient  · EF 40% PTA on lasix 20mg qd at home  · TTE on admission showing EF 20-25% with abnormal septal motion in setting of chronic LBBB, RVSP 38, mildly dilated RV  · Clinical presentation on arrival consistent with biventricular heart failure in setting of AECOPD    · Cont po lasix 40mg daily  · Goal-directed medical therapy: Beta-blocker concern due to blood pressure and end-stage COPD    · Sudden cardiac death risk reduction:  · ICD/Life Vest candidacy: can be a candidate if repeat TTE shows EF<35%  · Of note, patient has known hx of LBBB with QRS around 120ms  · Cardiac diet, sodium restriction <2g, fluid restriction <1 5L  · Daily I&Os  · Consider repeat TTE in future to assess for improvement in systolic function  · MAP goal >65     Acute on chronic respiratory failure with hypercapnia (HCC)  Assessment & Plan  · Chronically on 3 L at home  · Currently weaned down to 3 to 4 L supplemental O2  · Continue with treatment plan as above      Anemia  Assessment & Plan  · Hgb low but stable in 7's  No overt signs of GI bleed  · Iron panel suggesting mixed iron deficiency with likely anemia of chronic disease contribution  · Retic index   74 suggesting hypoproliferation  · Continue ferrous sulfate supplementation   · Trend CBC, transfuse if Hgb drops below 7  · On DVT ppx     Protein-calorie malnutrition ([address] )  Assessment & Plan  · Patient noted to be cachectic/ill-appearing  · 2/2 catabolic illness/COPD   · Continue Ensure supplementations with daily meals      Gastroesophageal reflux disease  Assessment & Plan  · Continue PPI    KEVIN and COPD overlap syndrome (HCC)  Assessment & Plan  · Uses BiPAP at home  · Continue BiPAP 12/6/40% qHs    Left bundle-branch block  Assessment & Plan  · Known history  · Possibly contributing to non-ischemic cardiomyopathy    Nonobstructive atherosclerosis of coronary artery  Assessment & Plan  · Coronary calcification seen on prior CT   · Follows with Dr Janeen Mercedes as an outpatient  · Continue with ASCVD prophylaxis with ASA and statin      SIRS (systemic inflammatory response syndrome) (HCC)-resolved as of 2/19/2023  Assessment & Plan  · Meet SIRS criteria on admission  · S/P 3-day course of azithromycin   · Continue ceftriaxone day 6/7; procalcitonin elevated on admission, now normalized  · Trend CBC and fever curve        VTE Pharmacologic Prophylaxis: VTE Score: 3 Moderate Risk (Score 3-4) - Pharmacological DVT Prophylaxis Ordered: enoxaparin (Lovenox)      Patient Centered Rounds: I performed bedside rounds with nursing staff today   Discussions with Specialists or Other Care Team Provider: CE     Education and Discussions with Family / Patient: Updated  (wife) at bedside  Total Time Spent on Date of Encounter in care of patient: 35 minutes This time was spent on one or more of the following: performing physical exam; counseling and coordination of care; obtaining or reviewing history; documenting in the medical record; reviewing/ordering tests, medications or procedures; communicating with other healthcare professionals and discussing with patient's family/caregivers  Current Length of Stay: 7 day(s)  Current Patient Status: Inpatient   Certification Statement: The patient will continue to require additional inpatient hospital stay due to Respiratory failure, generalized weakness, discharge planning, IV steroids  Discharge Plan: Anticipate discharge in >72 hrs to rehab facility  Code Status: Level 3 - DNAR and DNI    Subjective:   Patient seen and examined  Following up for multiple issues as above  Overall feeling better though still deconditioned and weak  Can only walk a few steps with PT this morning  Fortunately stable on 3 L  No obvious respiratory distress  Objective:     Vitals:   Temp (24hrs), Av °F (36 7 °C), Min:97 3 °F (36 3 °C), Max:98 5 °F (36 9 °C)    Temp:  [97 3 °F (36 3 °C)-98 5 °F (36 9 °C)] 97 3 °F (36 3 °C)  HR:  [75-96] 88  Resp:  [14-22] 20  BP: ()/(53-60) 100/55  SpO2:  [94 %-100 %] 100 %  Body mass index is 22 39 kg/m²  Input and Output Summary (last 24 hours): Intake/Output Summary (Last 24 hours) at 2023 1458  Last data filed at 2023 1145  Gross per 24 hour   Intake --   Output 1350 ml   Net -1350 ml       PHYSICAL EXAM:    Vitals signs reviewed  Constitutional  awake and cooperative  Chronically ill-appearing  Cachectic  Head/Neck   Normocephalic  Atraumatic  HEENT   No scleral icterus  EOMI  Heart   Regular rate and rhythm  No murmers  Lungs  clear bilaterally with very faint end expiratory wheezing  Decreased air movement globally  Prolonged expiration  Abdomen   Soft  Nontender  Nondistended  Skin   Skin color normal  No rashes  Extremities   No deformities  No peripheral edema  Neuro   Alert and oriented  No new deficits  Psych   Mood stable  Affect normal          Additional Data:     Labs:  Results from last 7 days   Lab Units 02/19/23 0436 02/17/23 0436 02/16/23 0447   WBC Thousand/uL 12 34*   < > 10 56*   HEMOGLOBIN g/dL 8 4*   < > 7 3*   HEMATOCRIT % 28 8*   < > 23 5*   PLATELETS Thousands/uL 239   < > 185   NEUTROS PCT %  --   --  91*   LYMPHS PCT %  --   --  2*   MONOS PCT %  --   --  6   EOS PCT %  --   --  0    < > = values in this interval not displayed  Results from last 7 days   Lab Units 02/19/23 0436 02/17/23 0436 02/16/23 0447   SODIUM mmol/L 134*   < > 135   POTASSIUM mmol/L 4 4   < > 3 8   CHLORIDE mmol/L 94*   < > 92*   CO2 mmol/L 39*   < > 42*   BUN mg/dL 28*   < > 30*   CREATININE mg/dL 0 53*   < > 0 37*   ANION GAP mmol/L 1*   < > 1*   CALCIUM mg/dL 9 0   < > 8 7   ALBUMIN g/dL  --   --  3 1*   TOTAL BILIRUBIN mg/dL  --   --  0 59   ALK PHOS U/L  --   --  44*   ALT U/L  --   --  29   AST U/L  --   --  35   GLUCOSE RANDOM mg/dL 158*   < > 141*    < > = values in this interval not displayed           Results from last 7 days   Lab Units 02/19/23  1119 02/19/23  0717 02/18/23  2033 02/18/23  1604 02/18/23  1049 02/18/23  0713 02/17/23  2212 02/17/23  1554 02/17/23  1149 02/17/23  0613 02/17/23  0211 02/16/23  2359   POC GLUCOSE mg/dl 171* 169* 113 131 272* 133 193* 148* 292* 136 152* 167*     Results from last 7 days   Lab Units 02/14/23  0517   HEMOGLOBIN A1C % 5 4     Results from last 7 days   Lab Units 02/17/23 0436 02/13/23  0429   PROCALCITONIN ng/ml 0 09 0 87*       Lines/Drains:  Invasive Devices     Peripheral Intravenous Line  Duration           Peripheral IV 02/17/23 Left;Ventral (anterior) Forearm 2 days    Peripheral IV 02/17/23 Right;Ventral (anterior) Forearm 2 days                      Imaging: No pertinent imaging reviewed      Recent Cultures (last 7 days):   Results from last 7 days   Lab Units 02/13/23  0859   URINE CULTURE  <10,000 cfu/ml       Last 24 Hours Medication List:   Current Facility-Administered Medications   Medication Dose Route Frequency Provider Last Rate   • aspirin  81 mg Oral Daily Kamron Ye, DO     • budesonide  0 5 mg Nebulization Q12H Luis Felix DO     • chlorhexidine  15 mL Mouth/Throat Q12H [address] Kamron Kaplan, DO     • Diclofenac Sodium  2 g Topical 4x Daily Luis Felix DO     • enoxaparin  40 mg Subcutaneous Daily Luis Felix DO     • ergocalciferol  50,000 Units Oral Q28 Days Luis Felix, DO     • escitalopram  5 mg Oral Daily Luis Felix, DO     • ferrous sulfate  325 mg Oral Every Other Day Luis Felix, DO     • formoterol  20 mcg Nebulization Q12H Luis Felix DO     • furosemide  20 mg Oral Daily Luis Felix DO     • insulin lispro  1-5 Units Subcutaneous TID AC Lali Spencer DO     • insulin lispro  1-5 Units Subcutaneous HS Lali Spencer DO     • levalbuterol  1 25 mg Nebulization TID Luis Felix DO      And   • ipratropium  0 5 mg Nebulization TID Luis Felix DO     • methylPREDNISolone sodium succinate  40 mg Intravenous Q12H [address] Lali Spencer DO     • metoprolol succinate  25 mg Oral Daily Lali Spencer DO     • omeprazole (PRILOSEC) suspension 2 mg/mL  20 mg Oral Daily Kamron Ye, DO     • polyethylene glycol  17 g Oral Daily PRN Luis Felix DO     • pravastatin  80 mg Oral Daily With [address] [facility] Ye, DO     • senna-docusate sodium  1 tablet Oral HS Luis Felix, DO     • sodium chloride  2 spray Each Nare Q1H PRN Lali Spencer DO     • tamsulosin  0 4 mg Oral Daily With [address], DO          Today, Patient Was Seen By: Lali Boyce Johanna, DO    **Please Note: This note may have been constructed using a voice recognition system  **

## 2023-02-19 NOTE — PHYSICAL THERAPY NOTE
Physical Therapy Evaluation     Patient's Name: Smooth Fernandez      Admitting Diagnosis  Acute respiratory failure (HCC) [J96 00]  COPD (chronic obstructive pulmonary disease) ([facility]) [J44 9]  Respiratory failure (HCC) [J96 90]  CHF (congestive heart failure) ([facility] ) [I50 9]  Acute on chronic respiratory failure with hypoxia and hypercapnia ([facility]) [J96 21, J96 22]    Problem List  Patient Active Problem List   Diagnosis    Nonobstructive atherosclerosis of coronary artery    Nonischemic cardiomyopathy (HCC)    Chronic obstructive pulmonary disease (HCC)    Left bundle-branch block    KEVIN and COPD overlap syndrome (HCC)    Gastroesophageal reflux disease    Impaired fasting glucose    Osteoarthritis    Osteoporosis    Vitamin D deficiency    Mood disorder ([place] [address] )    Other insomnia    Macrocytosis without anemia    Acute on chronic respiratory failure with hypercapnia ([facility])    BPH with obstruction/lower urinary tract symptoms    Prostate cancer ([facility] )    Pulmonary nodules/lesions, multiple    Protein-calorie malnutrition ([place] [address] )    Acute on chronic HFrEF (heart failure with reduced ejection fraction) ([facility])    Anemia    SIRS (systemic inflammatory response syndrome) ([place] [address] )       Past Medical History  Past Medical History:   Diagnosis Date    Acute metabolic encephalopathy 3/13/8425    Arthritis     Bladder mass     Cardiomyopathy (HCC)     Chest pain     COPD (chronic obstructive pulmonary disease) (HCC)     CPAP (continuous positive airway pressure) dependence     Emphysema of lung (HCC)     Hypoxia     nocturnal    Left bundle branch block     Multiple pulmonary nodules     last assessed: 10/12/16    Pneumonia     Sleep apnea     Sleep apnea, obstructive     Smoker     Weight loss 8/29/2019       Past Surgical History  Past Surgical History:   Procedure Laterality Date    COLONOSCOPY      CYSTOSCOPY      CYSTOSCOPY  02/17/2021    VT BLADDER INSTILLATION ANTICARCINOGENIC AGENT N/A 1/3/2020    Procedure: INSTILLATION MITOMYCIN; Surgeon: Gayathri Estrada MD;  [facility]: [facility];  Service: Urology    [place] CYSTO W/REMOVAL OF LESIONS SMALL N/A 1/3/2020    Procedure: TRANSURETHRAL RESECTION OF BLADDER TUMOR (TURBT); Surgeon: Gayathri Estrada MD;  [facility];  Service: Urology    WV CYSTOURETHROSCOPY WITH BIOPSY N/A 4/13/2021    Procedure: Juan M Jazzmine;  Surgeon: Gayathri Estrada MD;  [facility];  Service: Urology    [place] TRURL ELECTROSURG [address] COMPLETE N/A 4/13/2021    Procedure: TRANSURETHRAL RESECTION OF PROSTATE (TURP) BLADDER BIOPSY, FULGURATION;  Surgeon: Gayathri Estrada MD;  [facility];  Service: Urology          02/19/23 0958   PT Last Visit   PT Visit Date 02/19/23   Note Type   Note type Evaluation   Pain Assessment   Pain Assessment Tool 0-10   Pain Score No Pain   Restrictions/Precautions   Braces or Orthoses   (denies)   Other Precautions Cognitive;Telemetry;O2;Fall Risk;Hard of hearing   Home Living   Type of [address] One level  (3 CARLO w/ hand rail)   Prior Function   Level of [place] Independent with functional mobility  (amb w/o AD; on suppl O2 at home)   Lives With Spouse; Leonard Morgan [facility]   General   Additional Pertinent History cleared for assessment (spoke to nsg)   Family/Caregiver Present Yes   Cognition   Overall Cognitive Status [facility]   Arousal/Participation Alert   Orientation Level Oriented to person;Oriented to place;Oriented to situation   Memory Decreased recall of recent events;Decreased recall of precautions   Following Commands Follows one step commands with increased time or repetition  ([place])   Subjective   Subjective Alert; in the chair; agreeable to try mobilization   RUE Assessment   RUE Assessment WFL  (AROM)   LUE Assessment   LUE Assessment WFL  (AROM)   RLE Assessment   RLE Assessment WFL  (AROM)   Strength RLE   RLE Overall Strength   (fair + (grossly))   LLE Assessment   LLE Assessment WFL  (AROM)   Strength LLE   LLE Overall Strength   (fair + (grossly))   Transfers   Sit to Stand 3  Moderate assistance   Additional items Assist x 1; Increased time required;Verbal cues  (2 trials)   Stand to Sit 3  Moderate assistance   Additional items Assist x 1; Increased time required;Verbal cues  (2 trials)   Ambulation/Elevation   Gait pattern Excessively slow; Short stride; Foward flexed; Shuffling; Inconsistent jonathan   Gait Assistance 3  Moderate assist   Additional items Assist x 1;Verbal cues; Tactile cues  (chair follow)   Assistive Device Rolling walker   Distance 8 ft total distance; limited by fatigue; chair ride back to bedside   Balance   Static Sitting Fair   Dynamic Sitting Fair -   Static Standing Poor +   Dynamic Standing Poor   Ambulatory Poor   Activity Tolerance   Activity Tolerance Patient limited by fatigue   Nurse Made Aware spoke to Ale Taveras RN   Assessment   Prognosis Fair   Problem List Decreased strength;Decreased endurance; Impaired balance;Decreased mobility; Decreased cognition;Decreased safety awareness; Impaired hearing   Assessment Pt is 74 y o  male admitted with hx of SOB and Dx of Acute/chronic hypoxic/hypercarbic respiratory failure, Severe COPD with exacerbation  Pt 's comorbidities affecting POC include: Acute metabolic encephalopathy, Arthritis, Cardiomyopathy ([address] ), COPD (chronic obstructive pulmonary disease) (HCC), CPAP (continuous positive airway pressure) dependence, Left bundle branch block, Sleep apnea, and Smoker and personal factors of: CARLO  Pt's clinical presentation is currently  unstable/unpredictable which is evident in ongoing telem monitoring, abn lab values, and inability to progress further w/ mobilization due to fatigue requiring mod (A) for transfers and amb w/ rw  Pt presents w/ overall weakness, incl decreased LE strength, decreased functional endurance and activity tolerance, impaired balance, gait deviations and fall risk   Will cont to follow pt in PT for progressive mobilization to address above functional deficits and to max level of (I), endurance, and safety  Currently, overall observed mobility status is mod below premorbid level (also per family), recommend  rehab upon D/C  Will cont to follow until then  Barriers to Discharge Inaccessible home environment   Goals   Patient Goals to feel better   STG Expiration Date 03/01/23   Short Term Goal #1 7-10 days  Pt will amb 150 ft w/ rw <--> SPC, mod (I) in order to facilitate safe return to premorbid environment and to initiate return to community amb status  Pt will negotiate 3 steps w/ hand rail and SPC PRN, (S)x1 in order to assure safe navigation in and out of the premorbid living environment  Pt will achieve (I) level w/ bed mob in order to facilitate safety with OOB and back to bed transitions in own living environment  Pt will perform transfers w/ mod (I) to assure (I) and safety w/ functional mobility/transitions w/ all aspects of mobility/locomotion  Pt will participate in LE therex and balance activities to max progression w/ mobility skills  PT Treatment Day 0   Plan   Treatment/Interventions Functional transfer training;LE strengthening/ROM; Elevations; Therapeutic exercise; Endurance training;Equipment eval/education; Bed mobility;Gait training;Spoke to nursing;Family; Patient/family training   PT Frequency 3-5x/wk   Recommendation   PT Discharge Recommendation Post acute rehabilitation services   Equipment Recommended Walker  (at this time)   Charlie 8 in Flat Bed Without Bedrails 3   Lying on Back to Sitting on Edge of Flat Bed Without Bedrails 2   Moving Bed to Chair 2   Standing Up From Chair Using Arms 2   Walk in Room 2   Climb 3-5 Stairs With Railing 2   Basic Mobility Inpatient Raw Score 13   Basic Mobility Standardized Score 33 99   Highest Level Of Mobility   -HLM Goal 4: Move to chair/commode   -HLM Achieved 4: Move to chair/commode   Modified Rosio Scale   Modified Rosio Scale 4   End of Consult   Patient Position at End of Consult Bedside chair; All needs within reach         [facility], PT

## 2023-02-20 LAB
ANION GAP SERPL CALCULATED.3IONS-SCNC: 3 MMOL/L (ref 4–13)
BUN SERPL-MCNC: 29 MG/DL (ref 5–25)
CALCIUM SERPL-MCNC: 8.6 MG/DL (ref 8.3–10.1)
CHLORIDE SERPL-SCNC: 93 MMOL/L (ref 96–108)
CO2 SERPL-SCNC: 38 MMOL/L (ref 21–32)
CREAT SERPL-MCNC: 0.34 MG/DL (ref 0.6–1.3)
ERYTHROCYTE [DISTWIDTH] IN BLOOD BY AUTOMATED COUNT: 12.6 % (ref 11.6–15.1)
GFR SERPL CREATININE-BSD FRML MDRD: 133 ML/MIN/1.73SQ M
GLUCOSE SERPL-MCNC: 134 MG/DL (ref 65–140)
GLUCOSE SERPL-MCNC: 149 MG/DL (ref 65–140)
GLUCOSE SERPL-MCNC: 153 MG/DL (ref 65–140)
GLUCOSE SERPL-MCNC: 155 MG/DL (ref 65–140)
GLUCOSE SERPL-MCNC: 313 MG/DL (ref 65–140)
HCT VFR BLD AUTO: 25.9 % (ref 36.5–49.3)
HGB BLD-MCNC: 8.1 G/DL (ref 12–17)
MCH RBC QN AUTO: 31.6 PG (ref 26.8–34.3)
MCHC RBC AUTO-ENTMCNC: 31.3 G/DL (ref 31.4–37.4)
MCV RBC AUTO: 101 FL (ref 82–98)
PLATELET # BLD AUTO: 233 THOUSANDS/UL (ref 149–390)
PMV BLD AUTO: 9.8 FL (ref 8.9–12.7)
POTASSIUM SERPL-SCNC: 3.9 MMOL/L (ref 3.5–5.3)
RBC # BLD AUTO: 2.56 MILLION/UL (ref 3.88–5.62)
SODIUM SERPL-SCNC: 134 MMOL/L (ref 135–147)
WBC # BLD AUTO: 8.6 THOUSAND/UL (ref 4.31–10.16)

## 2023-02-20 RX ORDER — LOSARTAN POTASSIUM 25 MG/1
12.5 TABLET ORAL DAILY
Status: DISCONTINUED | OUTPATIENT
Start: 2023-02-20 | End: 2023-02-28 | Stop reason: HOSPADM

## 2023-02-20 RX ORDER — PREDNISONE 20 MG/1
40 TABLET ORAL DAILY
Status: COMPLETED | OUTPATIENT
Start: 2023-02-21 | End: 2023-02-23

## 2023-02-20 RX ORDER — PREDNISONE 20 MG/1
20 TABLET ORAL DAILY
Status: DISCONTINUED | OUTPATIENT
Start: 2023-02-27 | End: 2023-02-28 | Stop reason: HOSPADM

## 2023-02-20 RX ORDER — PREDNISONE 10 MG/1
10 TABLET ORAL DAILY
Status: DISCONTINUED | OUTPATIENT
Start: 2023-03-02 | End: 2023-02-28 | Stop reason: HOSPADM

## 2023-02-20 RX ADMIN — BUDESONIDE 0.5 MG: 0.5 INHALANT ORAL at 19:29

## 2023-02-20 RX ADMIN — DICLOFENAC SODIUM 2 G: 10 GEL TOPICAL at 17:43

## 2023-02-20 RX ADMIN — DICLOFENAC SODIUM 2 G: 10 GEL TOPICAL at 08:53

## 2023-02-20 RX ADMIN — ESCITALOPRAM OXALATE 5 MG: 5 TABLET, FILM COATED ORAL at 08:50

## 2023-02-20 RX ADMIN — TAMSULOSIN HYDROCHLORIDE 0.4 MG: 0.4 CAPSULE ORAL at 15:41

## 2023-02-20 RX ADMIN — LEVALBUTEROL HYDROCHLORIDE 1.25 MG: 1.25 SOLUTION, CONCENTRATE RESPIRATORY (INHALATION) at 13:21

## 2023-02-20 RX ADMIN — INSULIN LISPRO 1 UNITS: 100 INJECTION, SOLUTION INTRAVENOUS; SUBCUTANEOUS at 06:35

## 2023-02-20 RX ADMIN — INSULIN LISPRO 1 UNITS: 100 INJECTION, SOLUTION INTRAVENOUS; SUBCUTANEOUS at 16:22

## 2023-02-20 RX ADMIN — PRAVASTATIN SODIUM 80 MG: 20 TABLET ORAL at 15:41

## 2023-02-20 RX ADMIN — LEVALBUTEROL HYDROCHLORIDE 1.25 MG: 1.25 SOLUTION, CONCENTRATE RESPIRATORY (INHALATION) at 07:19

## 2023-02-20 RX ADMIN — BUDESONIDE 0.5 MG: 0.5 INHALANT ORAL at 07:19

## 2023-02-20 RX ADMIN — IPRATROPIUM BROMIDE 0.5 MG: 0.5 SOLUTION RESPIRATORY (INHALATION) at 07:19

## 2023-02-20 RX ADMIN — FORMOTEROL FUMARATE DIHYDRATE 20 MCG: 20 SOLUTION RESPIRATORY (INHALATION) at 07:32

## 2023-02-20 RX ADMIN — IPRATROPIUM BROMIDE 0.5 MG: 0.5 SOLUTION RESPIRATORY (INHALATION) at 13:22

## 2023-02-20 RX ADMIN — ASPIRIN 81 MG CHEWABLE TABLET 81 MG: 81 TABLET CHEWABLE at 08:50

## 2023-02-20 RX ADMIN — LOSARTAN POTASSIUM 12.5 MG: 25 TABLET, FILM COATED ORAL at 16:24

## 2023-02-20 RX ADMIN — METHYLPREDNISOLONE SODIUM SUCCINATE 40 MG: 40 INJECTION, POWDER, FOR SOLUTION INTRAMUSCULAR; INTRAVENOUS at 08:50

## 2023-02-20 RX ADMIN — IPRATROPIUM BROMIDE 0.5 MG: 0.5 SOLUTION RESPIRATORY (INHALATION) at 19:29

## 2023-02-20 RX ADMIN — CHLORHEXIDINE GLUCONATE 0.12% ORAL RINSE 15 ML: 1.2 LIQUID ORAL at 20:40

## 2023-02-20 RX ADMIN — ENOXAPARIN SODIUM 40 MG: 40 INJECTION SUBCUTANEOUS at 08:50

## 2023-02-20 RX ADMIN — LEVALBUTEROL HYDROCHLORIDE 1.25 MG: 1.25 SOLUTION, CONCENTRATE RESPIRATORY (INHALATION) at 19:29

## 2023-02-20 RX ADMIN — INSULIN LISPRO 3 UNITS: 100 INJECTION, SOLUTION INTRAVENOUS; SUBCUTANEOUS at 11:16

## 2023-02-20 RX ADMIN — METHYLPREDNISOLONE SODIUM SUCCINATE 40 MG: 40 INJECTION, POWDER, FOR SOLUTION INTRAMUSCULAR; INTRAVENOUS at 20:40

## 2023-02-20 RX ADMIN — FORMOTEROL FUMARATE DIHYDRATE 20 MCG: 20 SOLUTION RESPIRATORY (INHALATION) at 19:29

## 2023-02-20 RX ADMIN — Medication 20 MG: at 08:50

## 2023-02-20 RX ADMIN — CHLORHEXIDINE GLUCONATE 0.12% ORAL RINSE 15 ML: 1.2 LIQUID ORAL at 08:50

## 2023-02-20 NOTE — CASE MANAGEMENT
Case Management Discharge Planning Note    Patient name Tayo Bloom    Location [facility] 428-01 MRN [id number]  : 1955 Date 2023       Current Admission Date: 2023  Current Admission Diagnosis:Chronic obstructive pulmonary disease [facility])   Patient Active Problem List    Diagnosis Date Noted   • Anemia 2023   • Acute on chronic HFrEF (heart failure with reduced ejection fraction) ([facility] ) 2022   • Protein-calorie malnutrition ([facility] ) 2022   • Pulmonary nodules/lesions, multiple 2022   • Prostate cancer ([facility] ) 2021   • BPH with obstruction/lower urinary tract symptoms 2021   • Acute on chronic respiratory failure with hypercapnia ([facility] ) 2020   • Macrocytosis without anemia 2019   • Other insomnia 2019   • Gastroesophageal reflux disease 2017   • Nonobstructive atherosclerosis of coronary artery 2016   • Mood disorder ([facility] ) 2015   • Impaired fasting glucose 2015   • Osteoporosis 10/14/2014   • Vitamin D deficiency 10/14/2014   • Nonischemic cardiomyopathy ([facility] ) 2014   • Left bundle-branch block 2013   • Osteoarthritis 2013   • KEVIN and COPD overlap syndrome ([facility] ) 2013   • Chronic obstructive pulmonary disease ([facility] ) 2012      LOS (days): 8  Geometric Mean LOS (GMLOS) (days): 5 00  Days to GMLOS:-3 2     OBJECTIVE:  Risk of Unplanned Readmission Score: 26         Current admission status: Inpatient   [facility] Pharmacy:   [id number] [address]  [facility] [address]  Phone: [phone] Fax: [phone]    Primary Care Provider: Janak Locke DO    Primary Insurance: BLUE CROSS  Secondary Insurance: MEDICARE    DISCHARGE DETAILS:                                          Other Referral/Resources/Interventions Provided:  Interventions: Short Term Rehab  Referral Comments: Pt is recommended for rehab upon medical stability -- Pt and spouse in agreement with same, but would prefer acute rehab if pt qualifies  CM placed blanket referrals (SNF and IRF) this AM -- CM will follow for responses and present choices to pt/family accordingly           Treatment Team Recommendation: Short Term Rehab  Discharge Destination Plan[de-identified] Short Term Rehab

## 2023-02-20 NOTE — PROGRESS NOTES
PULMONOLOGY PROGRESS NOTE     Name: Annel Steiner  Age & Sex: 74 y o  male   MRN: [id number]  Unit/Bed#: [facility] 428-01   Encounter: [id number]    PATIENT INFORMATION     Name: Annel Steiner  Age & Sex: 74 y o  male   MRN: [id number]  Hospital Stay Days: 8    ASSESSMENT/PLAN     Assessment:   1  Acute on chronic respiratory failure with hypoxemic and hypercapnia; resolving  2  CAP; resolving  3  Acute exacerbation of COPD; resolving  4  Very severe COPD; end-stage  5  Acute decompensated heart failure with reduced ejection fraction; resolving  6  Pulmonary hypertension likely group 2 and group 3 disease  7  KEVIN and COPD overlap syndrome  8  Pulmonary nodules  9  Former smoker    Plan:  • COPD/CHF exacerbation with CAP requiring ICU admission and mechanical ventilation  • Completed course of antibiotics  • Wheezing is back to baseline  Will continue BID solu-medrol through today  Transition to oral prednisone starting tomorrow 40 mg x 3 days, 30 x 3, 20 x3, 10 x3  • Continue bronchodilators: formoterol, budesonide, levalbuterol, and ipratropium  Discharge back to home regimen: formoterol, budesonide, albuterol, and ipratropium  • Net ~ -7 L for admission  Appears euvolemic  Further diuresis per primary team    • GDMT for heart failure per primary team    • Continue with BPAP at night  Reportedly has trilogy at home  Will need to ensure this  Will need to ensure has some NIV at home  • Continue to supplement O2 to maintain an SpO2 of >88%  • All other care per primary team      SUBJECTIVE     Patient seen and examined  No acute events overnight  Feels somewhat improved though feels weak      OBJECTIVE     Vitals:    23 0235 23 0700 23 0730 23 0734   BP:    100/55   BP Location:       Pulse:    75   Resp:    17   Temp:    (!) 97 4 °F (36 3 °C)   TempSrc:    Oral   SpO2: 100%  98% 100%   Weight:  51 6 kg (113 lb 12 1 oz)     Height:          Temperature:   Temp (24hrs), Av 4 °F (36 3 °C), Min:97 3 °F (36 3 °C), Max:97 4 °F (36 3 °C)    Temperature: (!) 97 4 °F (36 3 °C)  Intake & Output:  I/O       02/18 0701  02/19 0700 02/19 0701  02/20 0700 02/20 0701 02/21 0700    Urine (mL/kg/hr) 1025 (0 8) 1375 (1 1)     Total Output 1025 1375     Net -1025 -1375            Unmeasured Urine Occurrence 1 x          Weights:   IBW (Ideal Body Weight): 50 kg    Body mass index is 22 22 kg/m²  Weight (last 2 days)     Date/Time Weight    02/20/23 0700 51 6 (113 76)    02/19/23 0600 52 (114 64)        Physical Exam  Vitals and nursing note reviewed  Constitutional:       General: He is not in acute distress  Appearance: Normal appearance  He is well-developed and normal weight  He is ill-appearing  He is not toxic-appearing  Interventions: He is not intubated  HENT:      Head: Normocephalic and atraumatic  Right Ear: External ear normal       Left Ear: External ear normal       Nose: Nose normal       Mouth/Throat:      Mouth: Mucous membranes are moist       Pharynx: Oropharynx is clear  Eyes:      General: No scleral icterus  Conjunctiva/sclera: Conjunctivae normal    Cardiovascular:      Rate and Rhythm: Normal rate and regular rhythm  Pulses: Normal pulses  Heart sounds: Normal heart sounds  No murmur heard  No friction rub  No gallop  Pulmonary:      Effort: Pulmonary effort is normal  No tachypnea, accessory muscle usage or respiratory distress  He is not intubated  Breath sounds: Decreased air movement present  Decreased breath sounds and wheezing present  No rhonchi or rales  Abdominal:      General: Abdomen is flat  Bowel sounds are normal       Palpations: Abdomen is soft  Musculoskeletal:         General: No swelling or tenderness  Cervical back: Neck supple  No tenderness  Skin:     General: Skin is warm and dry  Neurological:      General: No focal deficit present        Mental Status: He is alert and oriented to person, place, and time  Mental status is at baseline  LABORATORY DATA     Labs: I have personally reviewed pertinent reports  Results from last 7 days   Lab Units 02/20/23 0442 02/19/23 0436 02/18/23  0551 02/17/23 0436 02/16/23 0447 02/15/23  0507 02/14/23  0517   WBC Thousand/uL 8 60 12 34* 9 67   < > 10 56* 9 97 7 51   HEMOGLOBIN g/dL 8 1* 8 4* 8 0*   < > 7 3* 7 2* 7 4*   HEMATOCRIT % 25 9* 28 8* 27 0*   < > 23 5* 22 5* 23 0*   PLATELETS Thousands/uL 233 239 212   < > 185 179 179   NEUTROS PCT %  --   --   --   --  91* 89* 91*   MONOS PCT %  --   --   --   --  6 9 6    < > = values in this interval not displayed  Results from last 7 days   Lab Units 02/20/23 0442 02/19/23 0436 02/18/23  0551 02/17/23 0436 02/16/23 0447 02/15/23  0507 02/14/23  0517   POTASSIUM mmol/L 3 9 4 4 4 0   < > 3 8 3 5 3 7   CHLORIDE mmol/L 93* 94* 93*   < > 92* 88* 88*   CO2 mmol/L 38* 39* 39*   < > 42* 40* 38*   BUN mg/dL 29* 28* 28*   < > 30* 38* 31*   CREATININE mg/dL 0 34* 0 53* 0 58*   < > 0 37* 0 45* 0 51*   CALCIUM mg/dL 8 6 9 0 8 8   < > 8 7 8 2* 8 2*   ALK PHOS U/L  --   --   --   --  44* 49 44*   ALT U/L  --   --   --   --  29 24 23   AST U/L  --   --   --   --  35 32 38    < > = values in this interval not displayed  Results from last 7 days   Lab Units 02/17/23  0436 02/16/23  0447 02/15/23  0507   MAGNESIUM mg/dL 2 1 2 2 2 2     Results from last 7 days   Lab Units 02/17/23 0436 02/16/23 0447 02/15/23  0507   PHOSPHORUS mg/dL 3 0 2 8 2 5                      ABG:   Results from last 7 days   Lab Units 02/16/23  1014   PH ART  7 395   PCO2 ART mm Hg 65 8*   PO2 ART mm Hg 136 0*   HCO3 ART mmol/L 39 4*   BASE EXC ART mmol/L 12 8   ABG SOURCE  Line, Arterial       Micro:   Results from last 7 days   Lab Units 02/13/23  0859   URINE CULTURE  <10,000 cfu/ml         IMAGING & DIAGNOSTIC TESTING     Radiology Results: I have personally reviewed pertinent reports    XR chest portable    Result Date: 2/13/2023  Impression: 1   Persistent high positioning of the endotracheal tube  Advancement by at least 4 cm advised  2   Tip of right internal jugular central venous catheter projects over the lower SVC  No pneumothorax  3   Persistent pulmonary vascular congestion  The study was marked in [facility] for immediate notification  Workstation performed: [id number]     XR chest 1 view portable    Result Date: 2/12/2023  Impression: Moderate pulmonary venous congestion  ET tube 6 cm above the ghada  Workstation performed: [id number]     CT head wo contrast    Result Date: 2/12/2023  Impression: No acute intracranial abnormality  Workstation performed: [id number]     XR chest portable ICU    Result Date: 2/15/2023  Impression: Endotracheal tube projects 2-3 cm above the ghada  Stable moderate pulmonary vascular congestion  Workstation performed: [id number]     XR chest portable ICU    Result Date: 2/13/2023  Impression: Tubes and lines as above without pneumothorax  Recommend advancement of nasogastric tube  No acute cardiopulmonary disease  The study was marked in [facility] for immediate notification  Workstation performed: [id number]     Other Diagnostic Testing: I have personally reviewed pertinent reports      ACTIVE MEDICATIONS     Current Facility-Administered Medications   Medication Dose Route Frequency   • aspirin chewable tablet 81 mg  81 mg Oral Daily   • budesonide (PULMICORT) inhalation solution 0 5 mg  0 5 mg Nebulization Q12H   • chlorhexidine (PERIDEX) 0 12 % oral rinse 15 mL  15 mL Mouth/Throat Q12H GABE   • Diclofenac Sodium (VOLTAREN) 1 % topical gel 2 g  2 g Topical 4x Daily   • enoxaparin (LOVENOX) subcutaneous injection 40 mg  40 mg Subcutaneous Daily   • ergocalciferol (VITAMIN D2) capsule 50,000 Units  50,000 Units Oral Q28 Days   • escitalopram (LEXAPRO) tablet 5 mg  5 mg Oral Daily   • ferrous sulfate tablet 325 mg  325 mg Oral Every Other Day   • formoterol (PERFOROMIST) nebulizer solution 20 mcg  20 mcg Nebulization Q12H • furosemide (LASIX) tablet 20 mg  20 mg Oral Daily   • insulin lispro (HumaLOG) 100 units/mL subcutaneous injection 1-5 Units  1-5 Units Subcutaneous TID AC   • insulin lispro (HumaLOG) 100 units/mL subcutaneous injection 1-5 Units  1-5 Units Subcutaneous HS   • levalbuterol (XOPENEX) inhalation solution 1 25 mg  1 25 mg Nebulization TID    And   • ipratropium (ATROVENT) 0 02 % inhalation solution 0 5 mg  0 5 mg Nebulization TID   • methylPREDNISolone sodium succinate (Solu-MEDROL) injection 40 mg  40 mg Intravenous Q12H GABE   • metoprolol succinate (TOPROL-XL) 24 hr tablet 25 mg  25 mg Oral Daily   • omeprazole (PRILOSEC) suspension 2 mg/mL  20 mg Oral Daily   • polyethylene glycol (MIRALAX) packet 17 g  17 g Oral Daily PRN   • pravastatin (PRAVACHOL) tablet 80 mg  80 mg Oral Daily With Dinner   • senna-docusate sodium (SENOKOT S) 8 6-50 mg per tablet 1 tablet  1 tablet Oral HS   • sodium chloride (OCEAN) 0 65 % nasal spray 2 spray  2 spray Each Nare Q1H PRN   • tamsulosin (FLOMAX) capsule 0 4 mg  0 4 mg Oral Daily With Dinner         Disclaimer: Portions of the record may have been created with voice recognition software  Occasional wrong word or "sound a like" substitutions may have occurred due to the inherent limitations of voice recognition software  Careful consideration should be taken to recognize, using context, where substitutions have occurred      Kathryn Lowe DO   Pulmonary and Critical Care Fellow, PGY-V  [facility]

## 2023-02-20 NOTE — OCCUPATIONAL THERAPY NOTE
Occupational Therapy Evaluation     Patient Name: Sana Mccrary  Today's Date: 2/20/2023  Problem List  Principal Problem:    Chronic obstructive pulmonary disease ([facility] [address] )  Active Problems:    Nonobstructive atherosclerosis of coronary artery    KEVIN and COPD overlap syndrome (HCC)    Gastroesophageal reflux disease    Acute on chronic respiratory failure with hypercapnia (HCC)    Protein-calorie malnutrition (HCC)    Acute on chronic HFrEF (heart failure with reduced ejection fraction) ([facility] [address] )    Anemia    Past Medical History  Past Medical History:   Diagnosis Date    Acute metabolic encephalopathy 9/80/1320    Arthritis     Bladder mass     Cardiomyopathy (HCC)     Chest pain     COPD (chronic obstructive pulmonary disease) (HCC)     CPAP (continuous positive airway pressure) dependence     Emphysema of lung (HCC)     Hypoxia     nocturnal    Left bundle branch block     Multiple pulmonary nodules     last assessed: 10/12/16    Pneumonia     Sleep apnea     Sleep apnea, obstructive     Smoker     Weight loss 8/29/2019     Past Surgical History  Past Surgical History:   Procedure Laterality Date    COLONOSCOPY      CYSTOSCOPY      CYSTOSCOPY  02/17/2021    CT BLADDER INSTILLATION ANTICARCINOGENIC AGENT N/A 1/3/2020    Procedure: INSTILLATION MITOMYCIN;  Surgeon: Iwona Torres MD;  [facility];  Service: Urology    CT CYSTO W/REMOVAL OF LESIONS SMALL N/A 1/3/2020    Procedure: TRANSURETHRAL RESECTION OF BLADDER TUMOR (TURBT);   Surgeon: Iwona Torres MD;  [facility];  Service: Urology    CT CYSTOURETHROSCOPY WITH BIOPSY N/A 4/13/2021    Procedure: CYSTOSCOPY WITH BIOPSIES;  Surgeon: Iwona Torres MD;  [facility];  Service: Urology    CT UR[address] N/A 4/13/2021    Procedure: TRANSURETHRAL RESECTION OF PROSTATE (TURP) BLADDER BIOPSY, FULGURATION;  Surgeon: Iwona Torres MD;  [facility]: [facility];  Service: Urology             02/20/23 [facility] Visit   OT Visit Date 02/20/23   Note Type   Note type Evaluation   Pain Assessment   Pain Assessment Tool 0-10   Pain Score No Pain   Restrictions/Precautions   Weight Bearing Precautions Per Order No   Other Precautions Chair Alarm; Bed Alarm;Telemetry;O2;Fall Risk  (4L of O2)   Home Living   Type of [address] [place] [address] One level;Stairs to enter with rails; Performs ADLs on one level  (3STE)   Bathroom Shower/Tub Tub/shower unit   Bathroom Toilet Standard   Bathroom Equipment Grab bars in shower  (planning on installing grab bars)   [address] Walker;Cane  (does not use at baseline)   Additional Comments Pt lives in a [facility] with 3STE and does not use DME at baseline  Prior Function   Level of [place] Independent with functional mobility; Independent with ADLs; Independent with IADLS   Lives With Spouse; Son   [address] 70 Help From Family   IADLs Independent with meal prep; Independent with medication management; Independent with driving   Falls in the last 6 months 1 to 4  (1 fall)   Vocational Retired   Lifestyle   Autonomy Pt reports being I in [facility], IADLs, and does not use DME at baseline  (+)   Reciprocal Relationships Pt lives with his wife and son who can A if needed  Service to Others Pt is retired  Intrinsic Gratification Pt enjoys putting together tiny models     General   Family/Caregiver Present Yes   Subjective   Subjective "I am not in any pain right now when I am not moving"   ADL   Where Assessed Chair   Eating Assistance 5  Supervision/Setup   Grooming Assistance 5  Supervision/Setup   UB Bathing Assistance 4  Minimal Assistance   LB Bathing Assistance 3  Moderate Assistance   [address] 4  [address] 3  Moderate [address]  4  Minimal Assistance   Functional Assistance 3  Moderate Assistance   Bed Mobility   Additional Comments unable to assess, pt was OOB in chair upon arrival  Pt was left sitting in chair with all necessary items within reach and chair alarm on  Transfers   Sit to Stand 4  Minimal assistance   Additional items Assist x 1; Increased time required   Stand to Sit 4  Minimal assistance   Additional items Assist x 1; Increased time required   Additional Comments transfers with RW   Functional Mobility   Functional Mobility 3  Moderate assistance   Additional Comments Pt was able to demonstrate household mobility with 2 seated rest breaks and mod Ax1 with RW  Additional items Rolling walker   Balance   Static Sitting Fair +   Dynamic Sitting Fair   Static Standing Fair -   Dynamic Standing Poor +   Ambulatory Poor   Activity Tolerance   Activity Tolerance Patient limited by fatigue   Medical Staff Made Aware Restorative therapist Sofía   Nurse Made Aware RN made aware   RUE Assessment   RUE Assessment WFL   LUE Assessment   LUE Assessment WFL   Hand Function   Gross Motor Coordination Functional   Fine Motor Coordination Functional   Psychosocial   Psychosocial (WDL) WDL   Cognition   Overall Cognitive Status WFL   Arousal/Participation Responsive; Alert;Arousable; Cooperative   Attention Attends with cues to redirect   Orientation Level Oriented X4   Memory Decreased recall of recent events;Decreased recall of precautions   Following Commands Follows one step commands with increased time or repetition   Comments Pt was pleasant and cooperative t/o session  Assessment   Limitation Decreased ADL status; Decreased UE ROM; Decreased UE strength;Decreased cognition;Decreased self-care trans;Decreased high-level ADLs   Prognosis Fair   Assessment Pt is 74 y o  male admitted to [facility] on 2/12/2023 w/ chronic obstructive pulmonary disease   Pt  has a past medical history of Acute metabolic encephalopathy (8/69/2343), Arthritis, Bladder mass, Cardiomyopathy ([facility] [facility] ), Chest pain, COPD (chronic obstructive pulmonary disease) ([facility] [address] ), CPAP (continuous positive airway pressure) dependence, Emphysema of lung ([facility] [address] ), Hypoxia, Left bundle branch block, Multiple pulmonary nodules, Pneumonia, Sleep apnea, Sleep apnea, obstructive, Smoker, and Weight loss (2019)    Pt with active OT orders and activity orders  Pt resides in a one story Home, with 3 CARLO  Pt lives with spouse and children; who can A if needed  Pt was I w/ ADLs, IADLs,(+) drove  Pt is currently functioning at S for eating and grooming, min A for UB ADLs, and mod A for LB ADLs  Pt requires min Ax1 for transfers and mod Ax1 for functional mobility  Pt is limited 2* pain, endurance, activity tolerance, functional mobility, functional standing tolerance and decreased I w/ ADLS/IADLS  The Areas of Occupational Performance Areas to address w/ pt include: eating, grooming, bathing/shower, toilet hygiene, dressing, health maintenance and functional mobility  Based off of an OT evaulation, assessment, and performance functioning, pt is identified as a high complexity  The patient's raw score on the AM-PAC Daily Activity Inpatient Short Form is 17  A raw score of less than 19 suggests the patient may benefit from discharge to post-acute rehabilitation services  Please refer to the recommendation of the Occupational Therapist for safe discharge planning  OT recommendation for d/c includes post acute rehab  Pt would benefit from continued acute OT services for  3-5x/week to  w/in 10-14 days:  to address functional goals  Goals   Patient Goals to get better   LTG Time Frame 10-14   Long Term Goal see goals below   Plan   Treatment Interventions ADL retraining;UE strengthening/ROM; Endurance training;Patient/family training;Equipment evaluation/education; Compensatory technique education;Continued evaluation;Cardiac education; Activityengagement   Goal Expiration Date 23   OT Frequency 3-5x/wk   Recommendation   OT Discharge Recommendation Post acute rehabilitation services   AM-PAC Daily Activity Inpatient   Lower Body Dressing 2   Bathing 2   Toileting 2   Upper Body Dressing 3   Grooming 4   Eating 4   Daily Activity Raw Score 17   Daily Activity Standardized Score (Calc for Raw Score >=11) 37 26   AM-PAC Applied Cognition Inpatient   Following a Speech/Presentation 4   Understanding Ordinary Conversation 4   Taking Medications 4   Remembering Where Things Are Placed or Put Away 4   Remembering List of 4-5 Errands 3   Taking Care of Complicated Tasks 3   Applied Cognition Raw Score 22   Applied Cognition Standardized Score 47 83   End of Consult   Education Provided Yes;Family or social support of family present for education by provider   Patient Position at End of Consult Bedside chair;Bed/Chair alarm activated; All needs within reach   Nurse Communication Nurse aware of consult     Pt w/ mod I will complete daily grooming tasks w/ adaptive equipment as needed  Pt will increase standing tolerance to 10 mins w/ mod I w/ adaptive equipment as needed  Pt will complete functional transfers w/ mod I on and off all surfaces w/ equipment as needed  Pt will complete functional mobility w/ mod I on and off all surfaces w/ equipment as needed  Pt will complete tolieting w/ mod I while demonstrating safety techniques w/ DME  Pt will complete feeding tasks w/ mod I using adaptive devices  Pt will demonstrate G safety awareness w/ mod I during OT session  Pt will demonstrate LE body dressing w/ mod I w/ adaptive equipment as needed  Pt will demonstrate UE body dressing w/ mod I w/ adaptive equipment as needed  Pt will increase activity tolerance to G during a 30 min OT tx session  Pt will demonstrate bed mobility skills w/ mod I  Pt will complete IADLs tasks w/ mod I  Pt will participate in a formal/functional cognitive assessment as needed to assist with safe discharge planning       Keturah Renteria OTR/L

## 2023-02-20 NOTE — PLAN OF CARE
Problem: OCCUPATIONAL THERAPY ADULT  Goal: Performs self-care activities at highest level of function for planned discharge setting  See evaluation for individualized goals  Description: Treatment Interventions: ADL retraining, UE strengthening/ROM, Endurance training, Patient/family training, Equipment evaluation/education, Compensatory technique education, Continued evaluation, Cardiac education, Activityengagement          See flowsheet documentation for full assessment, interventions and recommendations  Note: Limitation: Decreased ADL status, Decreased UE ROM, Decreased UE strength, Decreased cognition, Decreased self-care trans, Decreased high-level ADLs  Prognosis: Fair  Assessment: Pt is 74 y o  male admitted to [facility] on 2/12/2023 w/ chronic obstructive pulmonary disease  Pt  has a past medical history of Acute metabolic encephalopathy (1/91/1894), Arthritis, Bladder mass, Cardiomyopathy ([facility] [address] ), Chest pain, COPD (chronic obstructive pulmonary disease) ([facility]), CPAP (continuous positive airway pressure) dependence, Emphysema of lung ([facility] [address] ), Hypoxia, Left bundle branch block, Multiple pulmonary nodules, Pneumonia, Sleep apnea, Sleep apnea, obstructive, Smoker, and Weight loss (8/29/2019)    Pt with active OT orders and activity orders  Pt resides in a one story Home, with 3 CARLO  Pt lives with spouse and children; who can A if needed  Pt was I w/ ADLs, IADLs,(+) drove  Pt is currently functioning at S for eating and grooming, min A for UB ADLs, and mod A for LB ADLs  Pt requires min Ax1 for transfers and mod Ax1 for functional mobility  Pt is limited 2* pain, endurance, activity tolerance, functional mobility, functional standing tolerance and decreased I w/ ADLS/IADLS  The Areas of Occupational Performance Areas to address w/ pt include: eating, grooming, bathing/shower, toilet hygiene, dressing, health maintenance and functional mobility   Based off of an OT evaulation, assessment, and performance functioning, pt is identified as a high complexity  The patient's raw score on the AM-PAC Daily Activity Inpatient Short Form is 17  A raw score of less than 19 suggests the patient may benefit from discharge to post-acute rehabilitation services  Please refer to the recommendation of the Occupational Therapist for safe discharge planning  OT recommendation for d/c includes post acute rehab  Pt would benefit from continued acute OT services for  3-5x/week to  w/in 10-14 days:  to address functional goals       OT Discharge Recommendation: Post acute rehabilitation services

## 2023-02-20 NOTE — PROGRESS NOTES
[address] [facility]  Progress Note - Marilu Crouch  1955, 74 y o  male MRN: [id number]  Unit/Bed#: [facility] 428-01 Encounter: [id number]  Primary Care Provider: Devon Valladares DO   Date and time admitted to hospital: 2/12/2023  5:07 AM    Anemia  Assessment & Plan  · Hgb low but stable in 7's  No overt signs of GI bleed  · Iron panel suggesting mixed iron deficiency with likely anemia of chronic disease contribution  · Retic index   74 suggesting hypoproliferation  · Continue ferrous sulfate supplementation   · Trend CBC, transfuse if Hgb drops below 7  · On DVT ppx     Acute on chronic HFrEF (heart failure with reduced ejection fraction) (HCC)  Assessment & Plan  · NYHA class: III  / ACC/AHA stage: C; in setting of very severe COPD (FEV1 22%)  · Follows with Dr Divya Lopez as an outpatient  · EF 40% PTA on lasix 20mg qd at home  · TTE on admission showing EF 20-25% with abnormal septal motion in setting of chronic LBBB, RVSP 38, mildly dilated RV  · Clinical presentation on arrival consistent with biventricular heart failure in setting of AECOPD    · Cont po lasix 40mg daily  · Goal-directed medical therapy: Beta-blocker concern due to blood pressure and end-stage COPD  · Sudden cardiac death risk reduction:  · ICD/Life Vest candidacy: can be a candidate if repeat TTE shows EF<35%  · Of note, patient has known hx of LBBB with QRS around 120ms  · Cardiac diet, sodium restriction <2g, fluid restriction <1 5L  · Daily I&Os  · Consider repeat TTE in future to assess for improvement in systolic function  · MAP goal >65   · Consulted heart failure service given newly decreased ejection fraction, appreciate recs    Protein-calorie malnutrition ([facility] [address] )  Assessment & Plan  · Patient noted to be cachectic/ill-appearing    · 2/2 catabolic illness/COPD   · Continue Ensure supplementations with daily meals      Acute on chronic respiratory failure with hypercapnia (HCC)  Assessment & Plan  · Chronically on 3 L at home  · Currently weaned down to 3 to 4 L supplemental O2  · Continue with treatment plan as above      Gastroesophageal reflux disease  Assessment & Plan  · Continue PPI    KEVIN and COPD overlap syndrome (HCC)  Assessment & Plan  · Uses BiPAP at home  · Continue BiPAP 12/6/40% qHs    Nonobstructive atherosclerosis of coronary artery  Assessment & Plan  · Coronary calcification seen on prior CT   · Follows with Dr Nikhil Khan as an outpatient  · Continue with ASCVD prophylaxis with ASA and statin      * Chronic obstructive pulmonary disease ([facility] [address] )  Assessment & Plan  · Follows with pulm as an outpatient  · Last FEV1 22%, consistent with very-severe COPD  · Home regiment:  · 3L O2 at home  · All nebs- Duo-Neb/Pulmicort/Perforomist  · Albuterol rescue inhaler  · Prednisone 5 daily  · Multiple prior exacerbations requiring admission  · Intubated 2/12, extubated 2/15    · Currently requiring 3 to 4 L supplemental O2  · Wean O2 as able  · Maintain O2 sats between 88-92%  · Continue bronchodilators-- scheduled Xopenx/Atrovent QID, perforomist/pulmicort BID  · Continue Solu-medrol 40 mg TID; will start prednisone for 40 mg daily tomorrow with taper  · Aggressive pulmonary hygiene- use of incentive spirometry, VEST/airway clearance, OOB to chair as able  · Palliative consulted, appreciate recommendations  · Goals of care discussed with patient with wife at bedside, patient to remain level 3 DNR/DNI             VTE Pharmacologic Prophylaxis: VTE Score: 3 Moderate Risk (Score 3-4) - Pharmacological DVT Prophylaxis Ordered: enoxaparin (Lovenox)  Patient Centered Rounds: I performed bedside rounds with nursing staff today  Discussions with Specialists or Other Care Team Provider: Pulmonology    Education and Discussions with Family / Patient: Updated  () at bedside      Total Time Spent on Date of Encounter in care of patient: 35 minutes This time was spent on one or more of the following: performing physical exam; counseling and coordination of care; obtaining or reviewing history; documenting in the medical record; reviewing/ordering tests, medications or procedures; communicating with other healthcare professionals and discussing with patient's family/caregivers  Current Length of Stay: 8 day(s)  Current Patient Status: Inpatient   Certification Statement: The patient will continue to require additional inpatient hospital stay due to Heart failure and COPD exacerbation  Discharge Plan: Anticipate discharge in 48 hrs to rehab facility  Code Status: Level 3 - DNAR and DNI    Subjective:   Patient reports feeling comfortable this morning  Denies chest pain, shortness of breath, fever, chills, or any other symptoms  Patient reports that has been walking around with physical therapy  Patient reports that he has an upcoming appointment with heart failure/cardiology but he will be able to make it given hospitalization  I explained to him that we will most probably get heart failure on board given his newly decreased ejection fraction  Wife and son in the room  All questions and concerns addressed  Objective:     Vitals:   Temp (24hrs), Av 9 °F (36 6 °C), Min:97 4 °F (36 3 °C), Max:98 3 °F (36 8 °C)    Temp:  [97 4 °F (36 3 °C)-98 3 °F (36 8 °C)] 98 3 °F (36 8 °C)  HR:  [] 87  Resp:  [17] 17  BP: ()/(55-56) 95/55  SpO2:  [94 %-100 %] 97 %  Body mass index is 22 22 kg/m²  Input and Output Summary (last 24 hours): Intake/Output Summary (Last 24 hours) at 2023 1610  Last data filed at 2023 1118  Gross per 24 hour   Intake --   Output 1050 ml   Net -1050 ml       Physical Exam:   Physical Exam  Vitals and nursing note reviewed  Constitutional:       General: He is not in acute distress  Appearance: He is well-developed  He is cachectic  He is ill-appearing  HENT:      Head: Normocephalic and atraumatic     Eyes:      Conjunctiva/sclera: Conjunctivae normal    Cardiovascular:      Rate and Rhythm: Normal rate and regular rhythm  Heart sounds: No murmur heard  Pulmonary:      Effort: Pulmonary effort is normal  No respiratory distress  Breath sounds: Decreased breath sounds present  Abdominal:      Palpations: Abdomen is soft  Tenderness: There is no abdominal tenderness  Musculoskeletal:         General: No swelling  Cervical back: Neck supple  Right lower leg: No edema  Left lower leg: No edema  Skin:     General: Skin is warm and dry  Capillary Refill: Capillary refill takes less than 2 seconds  Neurological:      Mental Status: He is alert  Psychiatric:         Mood and Affect: Mood normal          Additional Data:     Labs:  Results from last 7 days   Lab Units 02/20/23 0442 02/17/23 0436 02/16/23 0447   WBC Thousand/uL 8 60   < > 10 56*   HEMOGLOBIN g/dL 8 1*   < > 7 3*   HEMATOCRIT % 25 9*   < > 23 5*   PLATELETS Thousands/uL 233   < > 185   NEUTROS PCT %  --   --  91*   LYMPHS PCT %  --   --  2*   MONOS PCT %  --   --  6   EOS PCT %  --   --  0    < > = values in this interval not displayed  Results from last 7 days   Lab Units 02/20/23 0442 02/17/23 0436 02/16/23 0447   SODIUM mmol/L 134*   < > 135   POTASSIUM mmol/L 3 9   < > 3 8   CHLORIDE mmol/L 93*   < > 92*   CO2 mmol/L 38*   < > 42*   BUN mg/dL 29*   < > 30*   CREATININE mg/dL 0 34*   < > 0 37*   ANION GAP mmol/L 3*   < > 1*   CALCIUM mg/dL 8 6   < > 8 7   ALBUMIN g/dL  --   --  3 1*   TOTAL BILIRUBIN mg/dL  --   --  0 59   ALK PHOS U/L  --   --  44*   ALT U/L  --   --  29   AST U/L  --   --  35   GLUCOSE RANDOM mg/dL 149*   < > 141*    < > = values in this interval not displayed           Results from last 7 days   Lab Units 02/20/23  1553 02/20/23  1028 02/20/23  5486 02/19/23 2056 02/19/23  1639 02/19/23  1119 02/19/23  0717 02/18/23  2033 02/18/23  1604 02/18/23  1049 02/18/23  0713 02/17/23  2212   POC GLUCOSE mg/dl 155* 313* 153* 204* 145* 171* 169* 113 131 272* 133 193*     Results from last 7 days   Lab Units 02/14/23  0517   HEMOGLOBIN A1C % 5 4     Results from last 7 days   Lab Units 02/17/23  0436   PROCALCITONIN ng/ml 0 09       Lines/Drains:  Invasive Devices     Peripheral Intravenous Line  Duration           Peripheral IV 02/17/23 Left;Ventral (anterior) Forearm 3 days    Peripheral IV 02/17/23 Right;Ventral (anterior) Forearm 3 days                      Imaging: No pertinent imaging reviewed      Recent Cultures (last 7 days):         Last 24 Hours Medication List:   Current Facility-Administered Medications   Medication Dose Route Frequency Provider Last Rate   • aspirin  81 mg Oral Daily Kamron Ye, DO     • budesonide  0 5 mg Nebulization Q12H Meeta Ben, DO     • chlorhexidine  15 mL Mouth/Throat Q12H [address] Kamron Cadena, DO     • Diclofenac Sodium  2 g Topical 4x Daily Meeta Ben, DO     • enoxaparin  40 mg Subcutaneous Daily Meeta Ben, DO     • ergocalciferol  50,000 Units Oral Q28 Days Meeta Ben, DO     • escitalopram  5 mg Oral Daily Meeta Ben, DO     • ferrous sulfate  325 mg Oral Every Other Day Meeta Ben, DO     • formoterol  20 mcg Nebulization Q12H Meeta Ben, DO     • furosemide  20 mg Oral Daily Meeta Ben, DO     • insulin lispro  1-5 Units Subcutaneous TID [facility] [place] Gillian Spearing Starsinic, DO     • insulin lispro  1-5 Units Subcutaneous HS Gillian Spearing Starsinic, DO     • levalbuterol  1 25 mg Nebulization TID Meeta Ben, DO      And   • ipratropium  0 5 mg Nebulization TID Meeta Ben, DO     • methylPREDNISolone sodium succinate  40 mg Intravenous Q12H [address] Joe Ulloa, DO     • metoprolol succinate  25 mg Oral Daily Gillian Spearing Heatherinic, DO     • omeprazole (PRILOSEC) suspension 2 mg/mL  20 mg Oral Daily Meeta Ben, DO     • polyethylene glycol  17 g Oral Daily PRN Meeta Ben, DO     • pravastatin  80 mg Oral Daily With [address], DO     • [START ON 2/21/2023] predniSONE  40 mg Oral Daily Karen Nyhan, DO      Followed by   • [START ON 2/24/2023] predniSONE  30 mg Oral Daily Karen Nyhan, DO      Followed by   • [START ON 2/27/2023] predniSONE  20 mg Oral Daily Karen Nyhan, DO      Followed by   • [START ON 3/2/2023] predniSONE  10 mg Oral Daily Karen Nyhan, DO     • senna-docusate sodium  1 tablet Oral HS Good Acosta DO     • sodium chloride  2 spray Each Nare Q1H PRN Maykel Spencer DO     • tamsulosin  0 4 mg Oral Daily With [address], DO          Today, Patient Was Seen By: Sultana Moser MD    **Please Note: This note may have been constructed using a voice recognition system  **

## 2023-02-20 NOTE — CONSULTS
Advanced Heart Failure / Pulmonary Hypertension Service Consultation    Mikhail Pompa  74 y o  male  MRN: [id number]  Unit/Bed#: [facility] 428-01; Encounter: [id number]    Assessment:  Principal Problem:    Chronic obstructive pulmonary disease ([address] )  Active Problems:    Nonobstructive atherosclerosis of coronary artery    KEVIN and COPD overlap syndrome (HCC)    Gastroesophageal reflux disease    Acute on chronic respiratory failure with hypercapnia ([facility] [address] )    Protein-calorie malnutrition ([facility] )    Acute on chronic HFrEF (heart failure with reduced ejection fraction) ([facility])    Anemia      HPI:   Mikhail Pompa  is a 47-year-old male who presented to [facility] on 2/12/2023 with an acute COPD exacerbation requiring intubation  Extubated 2/15/23  PMH includes nonischemic cardiomyopathy, chronic systolic and diastolic heart failure, nonobstructive CAD, hyperlipidemia, severe COPD, KEVIN  Was previously admitted in 8/2022 for acute decompensated heart failure and COPD exacerbation  LVEF 40% at that time, down from prior of 45 to 50%, though previously EF had been 35% for years  Started on Lasix at that time  Has been euvolemic, though blood pressures have made it difficult to start/titrate up on GDMT  Recently saw Dr Neha Connolly in the office in December, at which time his Lasix was shifted to as needed and adequate oral intake and hydration were encouraged  He was reportedly short of breath for 2 days prior to presentation, with hallucinations  His wife called 46 and EMS intubated him in the field  He was found to have an elevated procalcitonin on admission and started on azithromycin and ceftriaxone  He was given IV Lasix with good urine output  He was weaned off the ventilator and extubated on 2/15  Maintained on BiPAP overnight and has been receiving scheduled nebulizers with aggressive pulmonary hygiene  TTE on admission with LVEF 20 to 25%, down from 40% in 8/2022       He is also followed by pulmonology, who recommended consideration for home hospice for severe COPD in January  He was seen by palliative care while inpatient here, and patient and family elected to make code status level 3 DNR/DNI  Pulmonology has been following him while inpatient, and has been managing steroids, bronchodilators, and nebulizer therapy  Patient seen and examined  Resting in armchair at bedside  Appears comfortable  Reports generalized fatigue and weakness from deconditioning  Denies chest pain, palpitations, lower extremity swelling  Does report occasional pedal edema  Reports that he was taking Lasix every other day prior to admission  Denies any acute events or illnesses that he can remember prior to presentation  Heart failure service was consulted for "acute drop in EF " Patient follows with Dr Keyla Gutierrez and Dr Jasmine Holly for outpatient cardiology  Objective: Intake/ Output: 0/1375  Weight: 113 lb    Today's Plan:  • Appreciate palliative care input  Recommend ongoing goals of care discussion  • Would not be a LifeVest candidate at this time, particularly given DNR/DNI status  • Discontinue metoprolol  Start losartan 12 5 mg  If tolerated, may be able to add MRA tomorrow  • Recommend repeating limited TTE, potentially prior to discharge  • Euvolemic on current diuretic dose  • Keep K>4, Mg>2   • Daily weights, strict I/Os, daily BMP  Plan:  Acute on Chronic HFrEF; LVEF 20-25%; NYHA III; ACC/AHA Stage C   Etiology: Nonischemic cardiomyopathy  Possible dyssynchrony from chronic LBBB  Despite QRS only being 120 ms, echo shows significant dyssynchrony  Recent drop in EF likely related to stress myopathy with acute exacerbation of COPD requiring intubation  TTE 2/12/2023: LVEF 20-25%  Severe global hypokinesis with regional variation  Grade 1 DD  Abnormal septal motion consistent with LBBB  RV cavity mildly dilated, normal systolic function  Mild MR, mild TR   RVSP 38    Dilated IVC   TTE 8/26/2022: LVEF 40%  RV cavity mildly dilated  Systolic function normal   Mild MR, TR  Normal IVC  Neurohormonal Blockade:  --Beta Blocker: metoprolol succinate 25 mg QD (stop at this time)  --ARNi / ACEi / ARB: losartan 12 5 mg QD  --Aldosterone Antagonist: no  --SGLT2 Inhibitor: no  --Home Diuretic: Lasix PRN  --Inpatient Diuretic: Lasix 20 mg QD    Sudden Cardiac Death Risk Reduction:  --ICD: LVEF newly reduced to 20-25%    Electrical Resynchronization:  --Candidacy for BiV device: QRSd 120ms, chronic LBBB with dyssynchrony on echocardiogram     Advanced Therapies (if appropriate): Not appropriate at this time, particularly considering advanced lung disease  Nonobstructive CAD  Has coronary calcium on CT  On aspirin and statin  Hyperlipidemia  Continue statin  LDL 75 8/2021  COPD (end stage)  Former smoker; 105 pack years, quit in 2012  Severe disease, follows closely with pulm  On home oxygen, 3-4L  Hospitalized with acute COPD exacerbation requiring intubation, now extubated  Management per pulm  KEVIN   On BiPAP nightly        Past Medical History:   Diagnosis Date   • Acute metabolic encephalopathy 3/78/2134   • Arthritis    • Bladder mass    • Cardiomyopathy [facility])    • Chest pain    • COPD (chronic obstructive pulmonary disease) ([facility])    • CPAP (continuous positive airway pressure) dependence    • Emphysema of lung (HCC)    • Hypoxia     nocturnal   • Left bundle branch block    • Multiple pulmonary nodules     last assessed: 10/12/16   • Pneumonia    • Sleep apnea    • Sleep apnea, obstructive    • Smoker    • Weight loss 8/29/2019       Review of Systems   Constitutional: Positive for activity change and fatigue  Negative for chills and fever  HENT: Negative for ear pain and sore throat  Eyes: Negative for pain and visual disturbance  Respiratory: Positive for shortness of breath  Negative for cough  Cardiovascular: Negative for chest pain and palpitations  Gastrointestinal: Negative for abdominal pain and vomiting  Genitourinary: Negative for dysuria and hematuria  Musculoskeletal: Negative for arthralgias and back pain  Skin: Negative for color change and rash  Neurological: Negative for seizures and syncope  All other systems reviewed and are negative  14-point ROS completed and negative except as stated above and/or in the HPI          Current Facility-Administered Medications:   •  aspirin chewable tablet 81 mg, 81 mg, Oral, Daily, Kamron P Ye, DO, 81 mg at 02/20/23 0850  •  budesonide (PULMICORT) inhalation solution 0 5 mg, 0 5 mg, Nebulization, Q12H, Kamron P Ye, DO, 0 5 mg at 02/20/23 0719  •  chlorhexidine (PERIDEX) 0 12 % oral rinse 15 mL, 15 mL, Mouth/Throat, Q12H [facility], Kamron P Ye, DO, 15 mL at 02/20/23 4462  •  Diclofenac Sodium (VOLTAREN) 1 % topical gel 2 g, 2 g, Topical, 4x Daily, Kamron P Ye, DO, 2 g at 02/20/23 4215  •  enoxaparin (LOVENOX) subcutaneous injection 40 mg, 40 mg, Subcutaneous, Daily, Kamron P Ye, DO, 40 mg at 02/20/23 9166  •  ergocalciferol (VITAMIN D2) capsule 50,000 Units, 50,000 Units, Oral, Q28 Days, Kamron P Ye, DO  •  escitalopram (LEXAPRO) tablet 5 mg, 5 mg, Oral, Daily, Kamron P Ye, DO, 5 mg at 02/20/23 9500  •  ferrous sulfate tablet 325 mg, 325 mg, Oral, Every Other Day, Tessa Smoker, DO, 325 mg at 02/19/23 2160  •  formoterol (PERFOROMIST) nebulizer solution 20 mcg, 20 mcg, Nebulization, Q12H, Kamron P Ye, DO, 20 mcg at 02/20/23 8930  •  furosemide (LASIX) tablet 20 mg, 20 mg, Oral, Daily, Kamron P Ye, DO, 20 mg at 02/19/23 2028  •  insulin lispro (HumaLOG) 100 units/mL subcutaneous injection 1-5 Units, 1-5 Units, Subcutaneous, TID AC, 3 Units at 02/20/23 1116 **AND** Fingerstick Glucose (POCT), , , TID AC, Selin Spencer DO  •  insulin lispro (HumaLOG) 100 units/mL subcutaneous injection 1-5 Units, 1-5 Units, Subcutaneous, HS, Selin Spencer DO, 1 Units at 02/19/23 2155  •  levalbuterol (XOPENEX) inhalation solution 1 25 mg, 1 25 mg, Nebulization, TID, 1 25 mg at 02/20/23 1321 **AND** ipratropium (ATROVENT) 0 02 % inhalation solution 0 5 mg, 0 5 mg, Nebulization, TID, 0 5 mg at 02/20/23 1322 **AND** [DISCONTINUED] sodium chloride 0 9 % inhalation solution 3 mL, 3 mL, Nebulization, 4x Daily, Karen Nyhan, DO  •  methylPREDNISolone sodium succinate (Solu-MEDROL) injection 40 mg, 40 mg, Intravenous, Q12H [facility], Karen Nyhan, DO, 40 mg at 02/20/23 8057  •  metoprolol succinate (TOPROL-XL) 24 hr tablet 25 mg, 25 mg, Oral, Daily, Maykel Spencer DO, 25 mg at 02/18/23 1223  •  omeprazole (PRILOSEC) suspension 2 mg/mL, 20 mg, Oral, Daily, Kamron Ye DO, 20 mg at 02/20/23 0191  •  polyethylene glycol (MIRALAX) packet 17 g, 17 g, Oral, Daily PRN, Good Acosta DO  •  pravastatin (PRAVACHOL) tablet 80 mg, 80 mg, Oral, Daily With Dinner, Good Acosta DO, 80 mg at 02/19/23 1744  •  [START ON 2/21/2023] predniSONE tablet 40 mg, 40 mg, Oral, Daily **FOLLOWED BY** [START ON 2/24/2023] predniSONE tablet 30 mg, 30 mg, Oral, Daily **FOLLOWED BY** [START ON 2/27/2023] predniSONE tablet 20 mg, 20 mg, Oral, Daily **FOLLOWED BY** [START ON 3/2/2023] predniSONE tablet 10 mg, 10 mg, Oral, Daily, Karen Nyhan, DO  •  senna-docusate sodium (SENOKOT S) 8 6-50 mg per tablet 1 tablet, 1 tablet, Oral, HS, Kamron Ye DO, 1 tablet at 02/18/23 2101  •  sodium chloride (OCEAN) 0 65 % nasal spray 2 spray, 2 spray, Each Nare, Q1H PRN, Maykel Spencer DO  •  tamsulosin (FLOMAX) capsule 0 4 mg, 0 4 mg, Oral, Daily With Dinner, Good Acosta DO, 0 4 mg at 02/19/23 1744    No Known Allergies  Social History     Socioeconomic History   • Marital status: /Civil Union     Spouse name: Not on file   • Number of children: Not on file   • Years of education: Not on file   • Highest education level: Not on file   Occupational History   • Not on file   Tobacco Use • Smoking status: Former     Packs/day: 2 50     Years: 42 00     Pack years: 105 00     Types: Cigarettes     Start date: 5     Quit date:      Years since quittin 1   • Smokeless tobacco: Former   • Tobacco comments:     1 ppd for 37 years, 2010 down to 5 cigs a day, is around second hand smoke   Vaping Use   • Vaping Use: Never used   Substance and Sexual Activity   • Alcohol use: Not Currently     Comment: rarely   • Drug use: No   • Sexual activity: Not Currently     Partners: Female   Other Topics Concern   • Not on file   Social History Narrative    Daily coffee consumption: 8 or more cups a day        Used to work in [employer]  On disability  Social Determinants of Health     Financial Resource Strain: Not on file   Food Insecurity: No Food Insecurity   • Worried About [place] in the Last Year: Never true   • Ran Out of Food in the Last Year: Never true   Transportation Needs: No Transportation Needs   • Lack of Transportation (Medical): No   • Lack of Transportation (Non-Medical): No   Physical Activity: Not on file   Stress: Not on file   Social Connections: Not on file   Intimate Partner Violence: Not on file   Housing Stability: Unknown   • Unable to Pay for Housing in the Last Year: No   • Number of Places Lived in the Last Year: Not on file   • Unstable Housing in the Last Year: No     Family History   Problem Relation Age of Onset   • Diabetes Mother        Vitals:  Blood pressure 95/55, pulse 87, temperature 98 3 °F (36 8 °C), resp  rate 17, height 5' (1 524 m), weight 51 6 kg (113 lb 12 1 oz), SpO2 97 %  Body mass index is 22 22 kg/m²      I/O last 3 completed shifts:  In: -   Out: 5 [Urine:2225]    Wt Readings from Last 10 Encounters:   23 51 6 kg (113 lb 12 1 oz)   23 48 1 kg (106 lb)   22 44 3 kg (97 lb 9 6 oz)   22 49 8 kg (109 lb 12 8 oz)   10/06/22 50 3 kg (111 lb)   22 48 5 kg (107 lb)   22 51 7 kg (114 lb) 09/02/22 49 1 kg (108 lb 3 2 oz)   08/29/22 49 5 kg (109 lb 1 6 oz)   08/26/22 53 5 kg (118 lb)       Vitals:    02/20/23 0734 02/20/23 1152 02/20/23 1322 02/20/23 1503   BP: 100/55 105/56  95/55   BP Location:       Pulse: 75 101  87   Resp: 17   17   Temp: (!) 97 4 °F (36 3 °C)   98 3 °F (36 8 °C)   TempSrc: Oral      SpO2: 100% 96% 94% 97%   Weight:       Height:           Physical Exam  Constitutional:       Appearance: Normal appearance  HENT:      Head: Normocephalic  Nose: Nose normal       Mouth/Throat:      Mouth: Mucous membranes are moist    Eyes:      Conjunctiva/sclera: Conjunctivae normal    Cardiovascular:      Rate and Rhythm: Normal rate and regular rhythm  Comments: 1+ pitting pedal edema BL   Pulmonary:      Effort: Pulmonary effort is normal       Breath sounds: Decreased air movement present  Wheezing present  Musculoskeletal:      Cervical back: Neck supple  Skin:     General: Skin is dry  Neurological:      General: No focal deficit present  Mental Status: He is alert and oriented to person, place, and time     Psychiatric:         Mood and Affect: Mood normal          Behavior: Behavior normal          Labs & Results:      Results from last 7 days   Lab Units 02/20/23 0442 02/19/23  0436 02/18/23  0551   WBC Thousand/uL 8 60 12 34* 9 67   HEMOGLOBIN g/dL 8 1* 8 4* 8 0*   HEMATOCRIT % 25 9* 28 8* 27 0*   PLATELETS Thousands/uL 233 239 212         Results from last 7 days   Lab Units 02/20/23  0442 02/19/23  0436 02/18/23  0551 02/17/23  0436 02/16/23  0447 02/15/23  0507 02/14/23  0517   POTASSIUM mmol/L 3 9 4 4 4 0   < > 3 8 3 5 3 7   CHLORIDE mmol/L 93* 94* 93*   < > 92* 88* 88*   CO2 mmol/L 38* 39* 39*   < > 42* 40* 38*   BUN mg/dL 29* 28* 28*   < > 30* 38* 31*   CREATININE mg/dL 0 34* 0 53* 0 58*   < > 0 37* 0 45* 0 51*   CALCIUM mg/dL 8 6 9 0 8 8   < > 8 7 8 2* 8 2*   ALK PHOS U/L  --   --   --   --  44* 49 44*   ALT U/L  --   --   --   --  29 24 23   AST U/L  -- --   --   --  35 32 38    < > = values in this interval not displayed  Thank you for the opportunity to participate in the care of this patient      Yelitza López PA-C  Advanced Heart Failure  [address]

## 2023-02-20 NOTE — PLAN OF CARE
Problem: MOBILITY - ADULT  Goal: Maintain or return to baseline ADL function  Description: INTERVENTIONS:  -  Assess patient's ability to carry out ADLs; assess patient's baseline for ADL function and identify physical deficits which impact ability to perform ADLs (bathing, care of mouth/teeth, toileting, grooming, dressing, etc )  - Assess/evaluate cause of self-care deficits   - Assess range of motion  - Assess patient's mobility; develop plan if impaired  - Assess patient's need for assistive devices and provide as appropriate  - Encourage maximum independence but intervene and supervise when necessary  - Involve family in performance of ADLs  - Assess for home care needs following discharge   - Consider OT consult to assist with ADL evaluation and planning for discharge  - Provide patient education as appropriate  Outcome: Progressing     Problem: SAFETY ADULT  Goal: Maintain or return to baseline ADL function  Description: INTERVENTIONS:  -  Assess patient's ability to carry out ADLs; assess patient's baseline for ADL function and identify physical deficits which impact ability to perform ADLs (bathing, care of mouth/teeth, toileting, grooming, dressing, etc )  - Assess/evaluate cause of self-care deficits   - Assess range of motion  - Assess patient's mobility; develop plan if impaired  - Assess patient's need for assistive devices and provide as appropriate  - Encourage maximum independence but intervene and supervise when necessary  - Involve family in performance of ADLs  - Assess for home care needs following discharge   - Consider OT consult to assist with ADL evaluation and planning for discharge  - Provide patient education as appropriate  Outcome: Progressing     Problem: Prexisting or High Potential for Compromised Skin Integrity  Goal: Skin integrity is maintained or improved  Description: INTERVENTIONS:  - Identify patients at risk for skin breakdown  - Assess and monitor skin integrity  - Assess and monitor nutrition and hydration status  - Monitor labs   - Assess for incontinence   - Turn and reposition patient  - Assist with mobility/ambulation  - Relieve pressure over bony prominences  - Avoid friction and shearing  - Provide appropriate hygiene as needed including keeping skin clean and dry  - Evaluate need for skin moisturizer/barrier cream  - Collaborate with interdisciplinary team   - Patient/family teaching  - Consider wound care consult   Outcome: Progressing

## 2023-02-21 ENCOUNTER — APPOINTMENT (INPATIENT)
Dept: NON INVASIVE DIAGNOSTICS | Facility: HOSPITAL | Age: 68
End: 2023-02-21

## 2023-02-21 PROBLEM — R53.81 PHYSICAL DECONDITIONING: Status: ACTIVE | Noted: 2023-02-21

## 2023-02-21 LAB
ANION GAP SERPL CALCULATED.3IONS-SCNC: 3 MMOL/L (ref 4–13)
AORTIC ROOT: 3.6 CM
APICAL FOUR CHAMBER EJECTION FRACTION: 30 %
BUN SERPL-MCNC: 30 MG/DL (ref 5–25)
CALCIUM SERPL-MCNC: 8.6 MG/DL (ref 8.3–10.1)
CHLORIDE SERPL-SCNC: 91 MMOL/L (ref 96–108)
CO2 SERPL-SCNC: 37 MMOL/L (ref 21–32)
CREAT SERPL-MCNC: 0.43 MG/DL (ref 0.6–1.3)
FRACTIONAL SHORTENING: 14 % (ref 28–44)
GFR SERPL CREATININE-BSD FRML MDRD: 120 ML/MIN/1.73SQ M
GLUCOSE SERPL-MCNC: 110 MG/DL (ref 65–140)
GLUCOSE SERPL-MCNC: 120 MG/DL (ref 65–140)
GLUCOSE SERPL-MCNC: 133 MG/DL (ref 65–140)
GLUCOSE SERPL-MCNC: 146 MG/DL (ref 65–140)
GLUCOSE SERPL-MCNC: 202 MG/DL (ref 65–140)
HEMOCCULT STL QL: NEGATIVE
HEMOCCULT STL QL: NORMAL
HEMOCCULT STL QL: NORMAL
INTERVENTRICULAR SEPTUM IN DIASTOLE (PARASTERNAL SHORT AXIS VIEW): 0.8 CM
INTERVENTRICULAR SEPTUM: 0.8 CM (ref 0.6–1.1)
LEFT ATRIUM SIZE: 3.4 CM
LEFT INTERNAL DIMENSION IN SYSTOLE: 5.7 CM (ref 2.1–4)
LEFT VENTRICLE DIASTOLIC VOLUME (MOD BIPLANE): 218 ML
LEFT VENTRICLE SYSTOLIC VOLUME (MOD BIPLANE): 123 ML
LEFT VENTRICULAR INTERNAL DIMENSION IN DIASTOLE: 6.6 CM (ref 3.5–6)
LEFT VENTRICULAR POSTERIOR WALL IN END DIASTOLE: 0.7 CM
LEFT VENTRICULAR STROKE VOLUME: 63 ML
LV EF: 44 %
LVSV (TEICH): 63 ML
POTASSIUM SERPL-SCNC: 4 MMOL/L (ref 3.5–5.3)
RA PRESSURE ESTIMATED: 5 MMHG
RV PSP: 32 MMHG
SL CV LV EF: 30
SL CV PED ECHO LEFT VENTRICLE DIASTOLIC VOLUME (MOD BIPLANE) 2D: 226 ML
SL CV PED ECHO LEFT VENTRICLE SYSTOLIC VOLUME (MOD BIPLANE) 2D: 163 ML
SODIUM SERPL-SCNC: 131 MMOL/L (ref 135–147)
TR MAX PG: 27 MMHG
TR PEAK VELOCITY: 2.6 M/S
TRICUSPID ANNULAR PLANE SYSTOLIC EXCURSION: 2.8 CM
TRICUSPID VALVE PEAK REGURGITATION VELOCITY: 2.58 M/S

## 2023-02-21 RX ORDER — PREDNISONE 1 MG/1
5 TABLET ORAL DAILY
Status: DISCONTINUED | OUTPATIENT
Start: 2023-03-05 | End: 2023-02-28 | Stop reason: HOSPADM

## 2023-02-21 RX ADMIN — ENOXAPARIN SODIUM 40 MG: 40 INJECTION SUBCUTANEOUS at 08:46

## 2023-02-21 RX ADMIN — ESCITALOPRAM OXALATE 5 MG: 5 TABLET, FILM COATED ORAL at 08:47

## 2023-02-21 RX ADMIN — IPRATROPIUM BROMIDE 0.5 MG: 0.5 SOLUTION RESPIRATORY (INHALATION) at 14:14

## 2023-02-21 RX ADMIN — PRAVASTATIN SODIUM 80 MG: 20 TABLET ORAL at 16:55

## 2023-02-21 RX ADMIN — BUDESONIDE 0.5 MG: 0.5 INHALANT ORAL at 07:49

## 2023-02-21 RX ADMIN — LEVALBUTEROL HYDROCHLORIDE 1.25 MG: 1.25 SOLUTION, CONCENTRATE RESPIRATORY (INHALATION) at 14:14

## 2023-02-21 RX ADMIN — CHLORHEXIDINE GLUCONATE 0.12% ORAL RINSE 15 ML: 1.2 LIQUID ORAL at 08:46

## 2023-02-21 RX ADMIN — BUDESONIDE 0.5 MG: 0.5 INHALANT ORAL at 20:42

## 2023-02-21 RX ADMIN — LEVALBUTEROL HYDROCHLORIDE 1.25 MG: 1.25 SOLUTION, CONCENTRATE RESPIRATORY (INHALATION) at 07:49

## 2023-02-21 RX ADMIN — ASPIRIN 81 MG CHEWABLE TABLET 81 MG: 81 TABLET CHEWABLE at 08:47

## 2023-02-21 RX ADMIN — FORMOTEROL FUMARATE DIHYDRATE 20 MCG: 20 SOLUTION RESPIRATORY (INHALATION) at 20:42

## 2023-02-21 RX ADMIN — SENNOSIDES AND DOCUSATE SODIUM 1 TABLET: 8.6; 5 TABLET ORAL at 21:59

## 2023-02-21 RX ADMIN — FERROUS SULFATE TAB 325 MG (65 MG ELEMENTAL FE) 325 MG: 325 (65 FE) TAB at 08:47

## 2023-02-21 RX ADMIN — PREDNISONE 40 MG: 20 TABLET ORAL at 08:47

## 2023-02-21 RX ADMIN — LEVALBUTEROL HYDROCHLORIDE 1.25 MG: 1.25 SOLUTION, CONCENTRATE RESPIRATORY (INHALATION) at 20:42

## 2023-02-21 RX ADMIN — LOSARTAN POTASSIUM 12.5 MG: 25 TABLET, FILM COATED ORAL at 08:47

## 2023-02-21 RX ADMIN — TAMSULOSIN HYDROCHLORIDE 0.4 MG: 0.4 CAPSULE ORAL at 16:55

## 2023-02-21 RX ADMIN — DICLOFENAC SODIUM 2 G: 10 GEL TOPICAL at 22:02

## 2023-02-21 RX ADMIN — IPRATROPIUM BROMIDE 0.5 MG: 0.5 SOLUTION RESPIRATORY (INHALATION) at 20:42

## 2023-02-21 RX ADMIN — FUROSEMIDE 20 MG: 20 TABLET ORAL at 08:47

## 2023-02-21 RX ADMIN — FORMOTEROL FUMARATE DIHYDRATE 20 MCG: 20 SOLUTION RESPIRATORY (INHALATION) at 07:49

## 2023-02-21 RX ADMIN — IPRATROPIUM BROMIDE 0.5 MG: 0.5 SOLUTION RESPIRATORY (INHALATION) at 07:49

## 2023-02-21 RX ADMIN — Medication 20 MG: at 09:00

## 2023-02-21 RX ADMIN — INSULIN LISPRO 1 UNITS: 100 INJECTION, SOLUTION INTRAVENOUS; SUBCUTANEOUS at 16:55

## 2023-02-21 RX ADMIN — CHLORHEXIDINE GLUCONATE 0.12% ORAL RINSE 15 ML: 1.2 LIQUID ORAL at 21:59

## 2023-02-21 NOTE — PROGRESS NOTES
[facility]  Progress Note - Darian Scruggs  1955, 74 y o  male MRN: [id number]  Unit/Bed#: [facility] 428-01 Encounter: [id number]  Primary Care Provider: Crissy Ness DO   Date and time admitted to hospital: 2/12/2023  5:07 AM    * Chronic obstructive pulmonary disease ([address] )  Assessment & Plan  · Follows with pulm as an outpatient  · Last FEV1 22%, consistent with very-severe COPD  · Home regiment:  · 3L O2 at home  · All nebs- Duo-Neb/Pulmicort/Perforomist  · Albuterol rescue inhaler  · Prednisone 5 daily  · Multiple prior exacerbations requiring admission  · Intubated 2/12, extubated 2/15    · Currently requiring 3 to 4 L supplemental O2  · Wean O2 as able  · Maintain O2 sats between 88-92%  · Continue bronchodilators-- scheduled Xopenx/Atrovent QID, perforomist/pulmicort BID  · Continue Solu-medrol 40 mg TID; will start prednisone for 40 mg daily tomorrow with taper  · Aggressive pulmonary hygiene- use of incentive spirometry, VEST/airway clearance, OOB to chair as able  · Palliative consulted, appreciate recommendations  · Goals of care discussed with patient with wife at bedside, patient to remain level 3 DNR/DNI       Acute on chronic HFrEF (heart failure with reduced ejection fraction) (HCC)  Assessment & Plan  · NYHA class: III  / ACC/AHA stage: C; in setting of very severe COPD (FEV1 22%)  · Follows with Dr Avis Schneider as an outpatient  · EF 40% PTA on lasix 20mg qd at home  · TTE on admission showing EF 20-25% with abnormal septal motion in setting of chronic LBBB, RVSP 38, mildly dilated RV  · Clinical presentation on arrival consistent with biventricular heart failure in setting of AECOPD    · Cont po lasix 40mg daily  · Goal-directed medical therapy: Beta-blocker concern due to blood pressure and end-stage COPD    · Sudden cardiac death risk reduction:  · Not candidate for ICD/Life Vest candidacy due to DNR/DNI  · Of note, patient has known hx of LBBB with QRS around 120ms  · Cardiac diet, sodium restriction <2g, fluid restriction <1 5L  · Daily I&Os  · MAP goal >65   · Consulted heart failure service given newly decreased ejection fraction, appreciate recs  -Discontinued metoprolol, started losartan 12 5 mg  -Plan to start MRA today  -Echocardiogram ordered to assess for improvement in systolic function    Physical deconditioning  Assessment & Plan  Evaluated by PT/OT with recommendation for postacute rehab  Case management working on rehab options with family  Anemia  Assessment & Plan  · Hgb low but stable in 7's  No overt signs of GI bleed  · Iron panel suggesting mixed iron deficiency with likely anemia of chronic disease contribution  · Retic index   74 suggesting hypoproliferation  · Continue ferrous sulfate supplementation   · Trend CBC, transfuse if Hgb drops below 7  · On DVT ppx     Protein-calorie malnutrition ([address] )  Assessment & Plan  · Patient noted to be cachectic/ill-appearing  · 2/2 catabolic illness/COPD   · Continue Ensure supplementations with daily meals      Acute on chronic respiratory failure with hypercapnia (HCC)  Assessment & Plan  · Chronically on 3 L at home  · Currently weaned down to 3 to 4 L supplemental O2  · Continue with treatment plan as above      Gastroesophageal reflux disease  Assessment & Plan  · Continue PPI    KEVIN and COPD overlap syndrome (HCC)  Assessment & Plan  · Uses BiPAP at home  · Continue BiPAP 12/6/40% qHs    Nonobstructive atherosclerosis of coronary artery  Assessment & Plan  · Coronary calcification seen on prior CT   · Follows with Dr Tam Andrade as an outpatient  · Continue with ASCVD prophylaxis with ASA and statin          VTE Pharmacologic Prophylaxis: VTE Score: 3 Moderate Risk (Score 3-4) - Pharmacological DVT Prophylaxis Ordered: enoxaparin (Lovenox)  Patient Centered Rounds: I performed bedside rounds with nursing staff today    Discussions with Specialists or Other Care Team Provider: Heart failure, pulmonology    Education and Discussions with Family / Patient: Updated  (wife) at bedside  Total Time Spent on Date of Encounter in care of patient: 35 minutes This time was spent on one or more of the following: performing physical exam; counseling and coordination of care; obtaining or reviewing history; documenting in the medical record; reviewing/ordering tests, medications or procedures; communicating with other healthcare professionals and discussing with patient's family/caregivers  Current Length of Stay: 9 day(s)  Current Patient Status: Inpatient   Certification Statement: The patient will continue to require additional inpatient hospital stay due to Optimization of medical treatment, pending placement  Discharge Plan: Anticipate discharge in 48 hrs to rehab facility  Code Status: Level 3 - DNAR and DNI    Subjective:   Patient reports doing well this morning  Denies chest pain, shortness of breath, fever, chills  Reports numbness and tingling sensation on the tip of the fingers which is new since admitted to the hospital   Wife at bedside  All updates given, questions and concerns addressed  Objective:     Vitals:   Temp (24hrs), Av 2 °F (36 8 °C), Min:97 8 °F (36 6 °C), Max:98 4 °F (36 9 °C)    Temp:  [97 8 °F (36 6 °C)-98 4 °F (36 9 °C)] 97 8 °F (36 6 °C)  HR:  [] 70  Resp:  [16-17] 16  BP: ()/(55-63) 105/62  SpO2:  [88 %-98 %] 95 %  Body mass index is 19 93 kg/m²  Input and Output Summary (last 24 hours): Intake/Output Summary (Last 24 hours) at 2023 [facility]  Last data filed at 2023 0900  Gross per 24 hour   Intake 510 ml   Output 1475 ml   Net -965 ml       Physical Exam:   Physical Exam  Vitals and nursing note reviewed  Constitutional:       General: He is not in acute distress  Appearance: He is well-developed  He is cachectic  He is ill-appearing  HENT:      Head: Normocephalic and atraumatic     Eyes:      Conjunctiva/sclera: Conjunctivae normal    Cardiovascular:      Rate and Rhythm: Normal rate and regular rhythm  Heart sounds: No murmur heard  Pulmonary:      Effort: Pulmonary effort is normal  No respiratory distress  Breath sounds: Normal breath sounds  Comments: On 3 L nasal cannula  Abdominal:      Palpations: Abdomen is soft  Tenderness: There is no abdominal tenderness  Musculoskeletal:         General: No swelling  Cervical back: Neck supple  Right lower leg: No edema  Left lower leg: No edema  Skin:     General: Skin is warm and dry  Capillary Refill: Capillary refill takes less than 2 seconds  Neurological:      Mental Status: He is alert  Psychiatric:         Mood and Affect: Mood normal          Additional Data:     Labs:  Results from last 7 days   Lab Units 02/20/23  0442 02/17/23  0436 02/16/23 0447   WBC Thousand/uL 8 60   < > 10 56*   HEMOGLOBIN g/dL 8 1*   < > 7 3*   HEMATOCRIT % 25 9*   < > 23 5*   PLATELETS Thousands/uL 233   < > 185   NEUTROS PCT %  --   --  91*   LYMPHS PCT %  --   --  2*   MONOS PCT %  --   --  6   EOS PCT %  --   --  0    < > = values in this interval not displayed  Results from last 7 days   Lab Units 02/21/23  0940 02/17/23 0436 02/16/23 0447   SODIUM mmol/L 131*   < > 135   POTASSIUM mmol/L 4 0   < > 3 8   CHLORIDE mmol/L 91*   < > 92*   CO2 mmol/L 37*   < > 42*   BUN mg/dL 30*   < > 30*   CREATININE mg/dL 0 43*   < > 0 37*   ANION GAP mmol/L 3*   < > 1*   CALCIUM mg/dL 8 6   < > 8 7   ALBUMIN g/dL  --   --  3 1*   TOTAL BILIRUBIN mg/dL  --   --  0 59   ALK PHOS U/L  --   --  44*   ALT U/L  --   --  29   AST U/L  --   --  35   GLUCOSE RANDOM mg/dL 120   < > 141*    < > = values in this interval not displayed           Results from last 7 days   Lab Units 02/21/23  0545 02/20/23  2043 02/20/23  1553 02/20/23  1028 02/20/23  5944 02/19/23 2056 02/19/23  1639 02/19/23  1119 02/19/23  0717 02/18/23  2033 02/18/23  1604 02/18/23  1049   POC GLUCOSE mg/dl 146* 134 155* 313* 153* 204* 145* 171* 169* 113 131 272*         Results from last 7 days   Lab Units 02/17/23  0436   PROCALCITONIN ng/ml 0 09       Lines/Drains:  Invasive Devices     Peripheral Intravenous Line  Duration           Peripheral IV 02/21/23 Left;Proximal;Ventral (anterior) Forearm <1 day                      Imaging: No pertinent imaging reviewed      Recent Cultures (last 7 days):         Last 24 Hours Medication List:   Current Facility-Administered Medications   Medication Dose Route Frequency Provider Last Rate   • aspirin  81 mg Oral Daily Kamron Ye, DO     • budesonide  0 5 mg Nebulization Q12H Everlene Smaller, DO     • chlorhexidine  15 mL Mouth/Throat Q12H [address] Kamron Corcoran, DO     • Diclofenac Sodium  2 g Topical 4x Daily Everlene Smaller, DO     • enoxaparin  40 mg Subcutaneous Daily Everlene Smaller, DO     • ergocalciferol  50,000 Units Oral Q28 Days Everlene Smaller, DO     • escitalopram  5 mg Oral Daily Everlene Smaller, DO     • ferrous sulfate  325 mg Oral Every Other Day Everlene Smaller, DO     • formoterol  20 mcg Nebulization Q12H Everlene Smaller, DO     • furosemide  20 mg Oral Daily Everlene Smaller, DO     • insulin lispro  1-5 Units Subcutaneous TID [facility], [facility]     • insulin lispro  1-5 Units Subcutaneous HS [facility], DO     • levalbuterol  1 25 mg Nebulization TID Everlene Smaller, DO      And   • ipratropium  0 5 mg Nebulization TID Everlene Smaller, DO     • losartan  12 5 mg Oral Daily Odalys Flores PA-C     • omeprazole (PRILOSEC) suspension 2 mg/mL  20 mg Oral Daily Everlene Smaller, DO     • polyethylene glycol  17 g Oral Daily PRN Everlene Smaller, DO     • pravastatin  80 mg Oral Daily With [address] [facility] Cassi, DO     • predniSONE  40 mg Oral Daily Jaqui Mcguire, DO      Followed by   • [START ON 2/24/2023] predniSONE  30 mg Oral Daily Jaqui Mcguire DO      Followed by   • [START ON 2/27/2023] predniSONE  20 mg Oral Daily Emiliano Samson DO      Followed by   • [START ON 3/2/2023] predniSONE  10 mg Oral Daily Emiliano Samson DO     • [START ON 3/5/2023] predniSONE  5 mg Oral Daily Emiliano Samson DO     • senna-docusate sodium  1 tablet Oral HS Anmol Diaz, DO     • sodium chloride  2 spray Each Nare Q1H PRN Whitney Spencer, DO     • tamsulosin  0 4 mg Oral Daily With [address], DO          Today, Patient Was Seen By: Leeann Moser MD    **Please Note: This note may have been constructed using a voice recognition system  **

## 2023-02-21 NOTE — CASE MANAGEMENT
Case Management Discharge Planning Note    Patient name Ava Marie    Location [facility] MRN [id number]  : 1955 Date 2023       Current Admission Date: 2023  Current Admission Diagnosis:Chronic obstructive pulmonary disease [facility])   Patient Active Problem List    Diagnosis Date Noted   • Physical deconditioning 2023   • Anemia 2023   • Acute on chronic HFrEF (heart failure with reduced ejection fraction) ([facility] ) 2022   • Protein-calorie malnutrition ([facility] ) 2022   • Pulmonary nodules/lesions, multiple 2022   • Prostate cancer ([facility] ) 2021   • BPH with obstruction/lower urinary tract symptoms 2021   • Acute on chronic respiratory failure with hypercapnia ([facility] ) 2020   • Macrocytosis without anemia 2019   • Other insomnia 2019   • Gastroesophageal reflux disease 2017   • Nonobstructive atherosclerosis of coronary artery 2016   • Mood disorder ([facility] ) 2015   • Impaired fasting glucose 2015   • Osteoporosis 10/14/2014   • Vitamin D deficiency 10/14/2014   • Nonischemic cardiomyopathy ([facility] ) 2014   • Left bundle-branch block 2013   • Osteoarthritis 2013   • KEVIN and COPD overlap syndrome ([facility] ) 2013   • Chronic obstructive pulmonary disease ([facility] ) 2012      LOS (days): 9  Geometric Mean LOS (GMLOS) (days): 5 00  Days to GMLOS:-4 4     OBJECTIVE:  Risk of Unplanned Readmission Score: 26 34         Current admission status: Inpatient   Preferred Pharmacy:   [id number]  [facility] [address]  Phone: [phone] Fax: [phone]    Primary Care Provider: Edu Sevilla DO    Primary Insurance: BLUE CROSS  Secondary Insurance: MEDICARE    DISCHARGE DETAILS:    Discharge planning discussed with[de-identified] Patient and spouse at bedside  Freedom of Choice: Yes  Comments - Freedom of Choice: List of accepting SNF's given at bedside (as generated via [employer]) -- [facility] is unble to accept pt for admission, therefore information for [facility] also given at bedside  Spouse states she will consider [facility] vs  local [facility] SNF's ([facility]) -- CM will follow up to inquire about final choice  Treatment Team Recommendation: Short Term Rehab, Acute Rehab  Discharge Destination Plan[de-identified] Short Term Rehab, Acute Rehab                                IMM Given (Date):: 02/21/23  IMM Given to[de-identified] Family        Addendum 4pm:     Spoke with pt/spouse -- They would like to reserve the bed at [facility]  Representative Lupe Overton will get started on the auth  [facility] is b/u if authorization does not approve -- Representative Ema (admissions) advised of same and will follow

## 2023-02-21 NOTE — PROGRESS NOTES
PULMONOLOGY PROGRESS NOTE     Name: Derek Lindsay  Age & Sex: 74 y o  male   MRN: [id number]  Unit/Bed#: [facility] 428-01   Encounter: [id number]    PATIENT INFORMATION     Name: Derek Lindsay  Age & Sex: 74 y o  male   MRN: [id number]  Hospital Stay Days: 9    ASSESSMENT/PLAN     Assessment:   1  Acute on chronic respiratory failure with hypoxemic and hypercapnia; resolving  2  CAP; resolving  3  Acute exacerbation of COPD; resolving  4  Very severe COPD; end-stage  5  Acute decompensated heart failure with reduced ejection fraction; resolving  6  Pulmonary hypertension likely group 2 and group 3 disease  7  KEVIN and COPD overlap syndrome  8  Pulmonary nodules  9  Former smoker    Plan:  • COPD/CHF exacerbation with CAP requiring ICU admission and mechanical ventilation  • Completed course of antibiotics  • Wheezing is back to baseline  Transitioned to oral prednisone starting 40 mg x 3 days, 30 x 3, 20 x3, 10 x3  Then resume home 5 mg prednisone daily  • Continue bronchodilators: formoterol, budesonide, levalbuterol, and ipratropium  Discharge back to home regimen: formoterol, budesonide, albuterol, and ipratropium  • Net ~ -7 5 L for admission  Appears euvolemic  Further diuresis per primary team    • GDMT for heart failure per primary team    • Continue with BPAP at night  Reportedly has trilogy at home  Will need to ensure this  Will need to ensure has some NIV at home  • Continue to supplement O2 to maintain an SpO2 of >88%  • All other care per primary team      SUBJECTIVE     Patient seen and examined  No acute events overnight  Feels short of breath but at baseline  Feels weak       OBJECTIVE     Vitals:    23 0245 23 0312 23 0732 23 0751   BP:  105/62     BP Location:  Right arm     Pulse:  70     Resp:  16     Temp:  97 8 °F (36 6 °C)     TempSrc:  Axillary     SpO2: 95% 97%  95%   Weight:   46 3 kg (102 lb 1 2 oz)    Height:          Temperature:   Temp (24hrs), Av 2 °F (36 8 °C), Min:97 8 °F (36 6 °C), Max:98 4 °F (36 9 °C)    Temperature: 97 8 °F (36 6 °C)  Intake & Output:  I/O       02/18 0701  02/19 0700 02/19 0701  02/20 0700 02/20 0701 02/21 0700    Urine (mL/kg/hr) 1025 (0 8) 1375 (1 1)     Total Output 1025 1375     Net -1025 -1375            Unmeasured Urine Occurrence 1 x          Weights:   IBW (Ideal Body Weight): 50 kg    Body mass index is 19 93 kg/m²  Weight (last 2 days)     Date/Time Weight    02/21/23 0732 46 3 (102 07)    02/20/23 0700 51 6 (113 76)    02/19/23 0600 52 (114 64)        Physical Exam  Vitals and nursing note reviewed  Constitutional:       General: He is not in acute distress  Appearance: Normal appearance  He is well-developed and normal weight  He is ill-appearing  He is not toxic-appearing  Interventions: He is not intubated  HENT:      Head: Normocephalic and atraumatic  Right Ear: External ear normal       Left Ear: External ear normal       Nose: Nose normal       Mouth/Throat:      Mouth: Mucous membranes are moist       Pharynx: Oropharynx is clear  Eyes:      General: No scleral icterus  Conjunctiva/sclera: Conjunctivae normal    Cardiovascular:      Rate and Rhythm: Normal rate and regular rhythm  Pulses: Normal pulses  Heart sounds: Normal heart sounds  No murmur heard  No friction rub  No gallop  Pulmonary:      Effort: Pulmonary effort is normal  No tachypnea, accessory muscle usage or respiratory distress  He is not intubated  Breath sounds: Normal air entry  No decreased air movement  Decreased breath sounds and wheezing present  No rhonchi or rales  Abdominal:      General: Abdomen is flat  Bowel sounds are normal       Palpations: Abdomen is soft  Musculoskeletal:         General: No swelling or tenderness  Cervical back: Neck supple  No tenderness  Skin:     General: Skin is warm and dry  Neurological:      General: No focal deficit present        Mental Status: He is alert and oriented to person, place, and time  Mental status is at baseline  LABORATORY DATA     Labs: I have personally reviewed pertinent reports  Results from last 7 days   Lab Units 02/20/23 0442 02/19/23 0436 02/18/23  0551 02/17/23 0436 02/16/23 0447 02/15/23  0507   WBC Thousand/uL 8 60 12 34* 9 67   < > 10 56* 9 97   HEMOGLOBIN g/dL 8 1* 8 4* 8 0*   < > 7 3* 7 2*   HEMATOCRIT % 25 9* 28 8* 27 0*   < > 23 5* 22 5*   PLATELETS Thousands/uL 233 239 212   < > 185 179   NEUTROS PCT %  --   --   --   --  91* 89*   MONOS PCT %  --   --   --   --  6 9    < > = values in this interval not displayed  Results from last 7 days   Lab Units 02/20/23 0442 02/19/23 0436 02/18/23  0551 02/17/23  0436 02/16/23 0447 02/15/23  0507   POTASSIUM mmol/L 3 9 4 4 4 0   < > 3 8 3 5   CHLORIDE mmol/L 93* 94* 93*   < > 92* 88*   CO2 mmol/L 38* 39* 39*   < > 42* 40*   BUN mg/dL 29* 28* 28*   < > 30* 38*   CREATININE mg/dL 0 34* 0 53* 0 58*   < > 0 37* 0 45*   CALCIUM mg/dL 8 6 9 0 8 8   < > 8 7 8 2*   ALK PHOS U/L  --   --   --   --  44* 49   ALT U/L  --   --   --   --  29 24   AST U/L  --   --   --   --  35 32    < > = values in this interval not displayed  Results from last 7 days   Lab Units 02/17/23 0436 02/16/23 0447 02/15/23  0507   MAGNESIUM mg/dL 2 1 2 2 2 2     Results from last 7 days   Lab Units 02/17/23 0436 02/16/23 0447 02/15/23  0507   PHOSPHORUS mg/dL 3 0 2 8 2 5                      ABG:   Results from last 7 days   Lab Units 02/16/23  1014   PH ART  7 395   PCO2 ART mm Hg 65 8*   PO2 ART mm Hg 136 0*   HCO3 ART mmol/L 39 4*   BASE EXC ART mmol/L 12 8   ABG SOURCE  Line, Arterial       Micro:           IMAGING & DIAGNOSTIC TESTING     Radiology Results: I have personally reviewed pertinent reports  XR chest portable    Result Date: 2/13/2023  Impression: 1  Persistent high positioning of the endotracheal tube  Advancement by at least 4 cm advised   2   Tip of right internal jugular central venous catheter projects over the lower SVC  No pneumothorax  3   Persistent pulmonary vascular congestion  The study was marked in [facility] for immediate notification  Workstation performed: [id number]     XR chest 1 view portable    Result Date: 2/12/2023  Impression: Moderate pulmonary venous congestion  ET tube 6 cm above the ghada  Workstation performed: [id number]     CT head wo contrast    Result Date: 2/12/2023  Impression: No acute intracranial abnormality  Workstation performed: [id number]     XR chest portable ICU    Result Date: 2/15/2023  Impression: Endotracheal tube projects 2-3 cm above the ghada  Stable moderate pulmonary vascular congestion  Workstation performed: [id number]     XR chest portable ICU    Result Date: 2/13/2023  Impression: Tubes and lines as above without pneumothorax  Recommend advancement of nasogastric tube  No acute cardiopulmonary disease  The study was marked in [facility] for immediate notification  Workstation performed: [id number]     Other Diagnostic Testing: I have personally reviewed pertinent reports      ACTIVE MEDICATIONS     Current Facility-Administered Medications   Medication Dose Route Frequency   • aspirin chewable tablet 81 mg  81 mg Oral Daily   • budesonide (PULMICORT) inhalation solution 0 5 mg  0 5 mg Nebulization Q12H   • chlorhexidine (PERIDEX) 0 12 % oral rinse 15 mL  15 mL Mouth/Throat Q12H GABE   • Diclofenac Sodium (VOLTAREN) 1 % topical gel 2 g  2 g Topical 4x Daily   • enoxaparin (LOVENOX) subcutaneous injection 40 mg  40 mg Subcutaneous Daily   • ergocalciferol (VITAMIN D2) capsule 50,000 Units  50,000 Units Oral Q28 Days   • escitalopram (LEXAPRO) tablet 5 mg  5 mg Oral Daily   • ferrous sulfate tablet 325 mg  325 mg Oral Every Other Day   • formoterol (PERFOROMIST) nebulizer solution 20 mcg  20 mcg Nebulization Q12H   • furosemide (LASIX) tablet 20 mg  20 mg Oral Daily   • insulin lispro (HumaLOG) 100 units/mL subcutaneous injection 1-5 Units 1-5 Units Subcutaneous TID AC   • insulin lispro (HumaLOG) 100 units/mL subcutaneous injection 1-5 Units  1-5 Units Subcutaneous HS   • levalbuterol (XOPENEX) inhalation solution 1 25 mg  1 25 mg Nebulization TID    And   • ipratropium (ATROVENT) 0 02 % inhalation solution 0 5 mg  0 5 mg Nebulization TID   • losartan (COZAAR) tablet 12 5 mg  12 5 mg Oral Daily   • omeprazole (PRILOSEC) suspension 2 mg/mL  20 mg Oral Daily   • polyethylene glycol (MIRALAX) packet 17 g  17 g Oral Daily PRN   • pravastatin (PRAVACHOL) tablet 80 mg  80 mg Oral Daily With Dinner   • predniSONE tablet 40 mg  40 mg Oral Daily    Followed by   • [START ON 2/24/2023] predniSONE tablet 30 mg  30 mg Oral Daily    Followed by   • [START ON 2/27/2023] predniSONE tablet 20 mg  20 mg Oral Daily    Followed by   • [START ON 3/2/2023] predniSONE tablet 10 mg  10 mg Oral Daily   • senna-docusate sodium (SENOKOT S) 8 6-50 mg per tablet 1 tablet  1 tablet Oral HS   • sodium chloride (OCEAN) 0 65 % nasal spray 2 spray  2 spray Each Nare Q1H PRN   • tamsulosin (FLOMAX) capsule 0 4 mg  0 4 mg Oral Daily With Dinner         Disclaimer: Portions of the record may have been created with voice recognition software  Occasional wrong word or "sound a like" substitutions may have occurred due to the inherent limitations of voice recognition software  Careful consideration should be taken to recognize, using context, where substitutions have occurred      Karissa Fajardo DO   Pulmonary and Critical Care Fellow, PGY-V  [facility]

## 2023-02-21 NOTE — PLAN OF CARE
Problem: PHYSICAL THERAPY ADULT  Goal: Performs mobility at highest level of function for planned discharge setting  See evaluation for individualized goals  Description: Treatment/Interventions: Functional transfer training, LE strengthening/ROM, Elevations, Therapeutic exercise, Endurance training, Equipment eval/education, Bed mobility, Gait training, Spoke to nursing, Family, Patient/family training  Equipment Recommended: Daisy Johnston (at this time)       See flowsheet documentation for full assessment, interventions and recommendations  Outcome: Progressing  Note: Prognosis: Fair  Problem List: Decreased strength, Decreased endurance, Impaired balance, Decreased mobility, Decreased safety awareness, Impaired hearing  Assessment: Pt was able to progress w/ functional mobility ambulating further distance w/ less (A) required; pt still demonstrates overall weakness and decreased functional endurance w/ extended rest periods required b/in all bouts of activity; overall, cont to recommend rehab upon D/C when medically cleared; will follow  Barriers to Discharge: Inaccessible home environment     PT Discharge Recommendation: Post acute rehabilitation services    See flowsheet documentation for full assessment

## 2023-02-21 NOTE — PHYSICAL THERAPY NOTE
PHYSICAL THERAPY NOTE          Patient Name: Smooth Fernandez  Today's Date: 2/21/2023 02/21/23 1105   PT Last Visit   PT Visit Date 02/21/23   Note Type   Note Type Treatment   Pain Assessment   Pain Assessment Tool 0-10   Pain Score No Pain   Restrictions/Precautions   Other Precautions Telemetry;O2;Hard of hearing   General   Chart Reviewed Yes   Additional Pertinent History cleared for Tx session by cindi   Response to Previous Treatment Patient with no complaints from previous session  Family/Caregiver Present Yes   Cognition   Overall Cognitive Status WFL   Arousal/Participation Alert; Cooperative   Attention Attends with cues to redirect   Orientation Level Oriented to person;Oriented to place;Oriented to situation   Memory Decreased recall of precautions;Decreased recall of recent events   Following Commands Follows one step commands with increased time or repetition   Subjective   Subjective Alert; in the chair; agreeable to perform therex and mobilize   Transfers   Sit to Stand 4  Minimal assistance   Additional items Assist x 1; Increased time required;Verbal cues  (4 trials)   Stand to Sit 4  Minimal assistance   Additional items Assist x 1; Increased time required;Verbal cues  (4 trials)   Ambulation/Elevation   Gait pattern Excessively slow; Short stride; Inconsistent jonathan   Gait Assistance 4  Minimal assist   Additional items Assist x 1;Verbal cues; Tactile cues   Assistive Device Rolling walker   Distance 35 ft; limited by fatigue; decline in O2 sat noted to 86-87 % (on 3 L); chair ride back to bedside   Balance   Static Sitting Fair +   Dynamic Sitting Fair   Static Standing Fair -   Dynamic Standing Poor +   Ambulatory Poor +   Activity Tolerance   Activity Tolerance Patient limited by fatigue;Treatment limited secondary to medical complications (Comment)   Nurse Made Aware spoke to JYOTI Ramirez   Exercises   Knee AROM Long Arc Quad Sitting;10 reps;15 reps;AROM; Bilateral  (15 reps (R), 10 reps (L))   Ankle Pumps Sitting;15 reps;AROM; Bilateral   Marching Sitting;5 reps;AROM; Bilateral  (2 sets)   Balance training  Standing balance/toleance training x 1 min for humaira care (after residual stool noted on the pad upon initial standing)   Assessment   Prognosis Fair   Problem List Decreased strength;Decreased endurance; Impaired balance;Decreased mobility; Decreased safety awareness; Impaired hearing   Assessment Pt was able to progress w/ functional mobility ambulating further distance w/ less (A) required; pt still demonstrates overall weakness and decreased functional endurance w/ extended rest periods required b/in all bouts of activity; overall, cont to recommend rehab upon D/C when medically cleared; will follow   Barriers to Discharge Inaccessible home environment   Goals   Patient Goals to breath better   STG Expiration Date 03/01/23   PT Treatment Day 1   Plan   Treatment/Interventions Functional transfer training;LE strengthening/ROM; Elevations; Therapeutic exercise; Endurance training;Bed mobility;Gait training;Spoke to nursing;Spoke to case management; Family   Progress Progressing toward goals   PT Frequency 3-5x/wk   Recommendation   PT Discharge Recommendation Post acute rehabilitation services   Equipment Recommended Walker   AM-PAC Basic Mobility Inpatient   Turning in Flat Bed Without Bedrails 3   Lying on Back to Sitting on Edge of Flat Bed Without Bedrails 3   Moving Bed to Chair 3   Standing Up From Chair Using Arms 3   Walk in Room 3   Climb 3-5 Stairs With Railing 2   Basic Mobility Inpatient Raw Score 17   Basic Mobility Standardized Score 39 67   Highest Level Of Mobility   JH-HLM Goal 5: Stand one or more mins   JH-HLM Achieved 7: Walk 25 feet or more   Education   Education Provided Mobility training;Assistive device   Patient Reinforcement needed   End of Consult   Patient Position at End of Consult Bedside chair; All needs within reach     [facility]

## 2023-02-21 NOTE — ARC ADMISSION
Received referral on patient for possible ARC placement  Upon review of patient’s case with [facility] physician, patient deemed not ARC appropriate at this time as this patient will most likely not be able to tolerate aggressive therapy  CM has been updated

## 2023-02-21 NOTE — UTILIZATION REVIEW
Continued Stay Review    Date: 02/21/2023                         Current Patient Class: Inpatient  Current Level of Care: Med/Surg    HPI:67 y o  male initially admitted on 02/12/2023    Assessment/Plan: Acute Resp[iratory Failure / COPD  Currently requiring O2 @ 3-4 L via NC, wean as able  Continue duonebs, pulmicort, perforomist   IV solumedrol  Aggressive pulmonary hygiene  Continue to optimize medical treatment        Vital Signs: /62   Pulse 77   Temp 97 8 °F (36 6 °C) (Axillary)   Resp 16   Ht 5' (1 524 m)   Wt 46 3 kg (102 lb 1 2 oz)   SpO2 98%   BMI 19 93 kg/m²     Pertinent Labs/Diagnostic Results:     Results from last 7 days   Lab Units 02/20/23  0442 02/19/23  0436 02/18/23  0551 02/17/23  0436 02/16/23  0447 02/15/23  0507   WBC Thousand/uL 8 60 12 34* 9 67 8 41 10 56* 9 97   HEMOGLOBIN g/dL 8 1* 8 4* 8 0* 7 2* 7 3* 7 2*   HEMATOCRIT % 25 9* 28 8* 27 0* 23 6* 23 5* 22 5*   PLATELETS Thousands/uL 233 239 212 188 185 179   NEUTROS ABS Thousands/µL  --   --   --   --  9 68* 8 88*     Results from last 7 days   Lab Units 02/16/23 0447   RETIC CT ABS  60,700   RETIC CT PCT % 2 64*     Results from last 7 days   Lab Units 02/21/23  0940 02/20/23  0442 02/19/23  0436 02/18/23  0551 02/17/23  0436 02/16/23  0447 02/15/23  0507   SODIUM mmol/L 131* 134* 134* 133* 135 135 130*   POTASSIUM mmol/L 4 0 3 9 4 4 4 0 3 7 3 8 3 5   CHLORIDE mmol/L 91* 93* 94* 93* 91* 92* 88*   CO2 mmol/L 37* 38* 39* 39* 44* 42* 40*   ANION GAP mmol/L 3* 3* 1* 1* 0* 1* 2*   BUN mg/dL 30* 29* 28* 28* 26* 30* 38*   CREATININE mg/dL 0 43* 0 34* 0 53* 0 58* 0 37* 0 37* 0 45*   EGFR ml/min/1 73sq m 120 133 111 106 128 128 118   CALCIUM mg/dL 8 6 8 6 9 0 8 8 8 5 8 7 8 2*   CALCIUM, IONIZED mmol/L  --   --   --   --  1 10* 1 09* 1 07*   MAGNESIUM mg/dL  --   --   --   --  2 1 2 2 2 2   PHOSPHORUS mg/dL  --   --   --   --  3 0 2 8 2 5     Results from last 7 days   Lab Units 02/16/23  0447 02/15/23  0507   AST U/L 35 32   ALT U/L 29 24   ALK PHOS U/L 44* 49   TOTAL PROTEIN g/dL 5 4* 5 2*   ALBUMIN g/dL 3 1* 3 0*   TOTAL BILIRUBIN mg/dL 0 59 0 48     Results from last 7 days   Lab Units 02/21/23  1036 02/21/23  0545 02/20/23  2043 02/20/23  1553 02/20/23  1028 02/20/23  0632 02/19/23  2056 02/19/23  1639 02/19/23  1119 02/19/23  0717 02/18/23  2033 02/18/23  1604   POC GLUCOSE mg/dl 110 146* 134 155* 313* 153* 204* 145* 171* 169* 113 131     Results from last 7 days   Lab Units 02/21/23  0940 02/20/23  0442 02/19/23  0436 02/18/23  0551 02/17/23  0436 02/16/23  0447 02/15/23  0507   GLUCOSE RANDOM mg/dL 120 149* 158* 135 132 141* 276*      Results from last 7 days   Lab Units 02/16/23  1014   PH ART  7 395   PCO2 ART mm Hg 65 8*   PO2 ART mm Hg 136 0*   HCO3 ART mmol/L 39 4*   BASE EXC ART mmol/L 12 8   O2 CONTENT ART mL/dL 11 7*   O2 HGB, ARTERIAL % 97 7*   ABG SOURCE  Line, Arterial     Results from last 7 days   Lab Units 02/17/23  0436   PROCALCITONIN ng/ml 0 09     Medications:   Scheduled Medications:  aspirin, 81 mg, Oral, Daily  budesonide, 0 5 mg, Nebulization, Q12H  chlorhexidine, 15 mL, Mouth/Throat, Q12H GABE  Diclofenac Sodium, 2 g, Topical, 4x Daily  enoxaparin, 40 mg, Subcutaneous, Daily  ergocalciferol, 50,000 Units, Oral, Q28 Days  escitalopram, 5 mg, Oral, Daily  ferrous sulfate, 325 mg, Oral, Every Other Day  formoterol, 20 mcg, Nebulization, Q12H  furosemide, 20 mg, Oral, Daily  insulin lispro, 1-5 Units, Subcutaneous, TID AC  insulin lispro, 1-5 Units, Subcutaneous, HS  levalbuterol, 1 25 mg, Nebulization, TID   And  ipratropium, 0 5 mg, Nebulization, TID  losartan, 12 5 mg, Oral, Daily  omeprazole (PRILOSEC) suspension 2 mg/mL, 20 mg, Oral, Daily  pravastatin, 80 mg, Oral, Daily With Dinner  predniSONE, 40 mg, Oral, Daily   Followed by  Gabo Curiel ON 2/24/2023] predniSONE, 30 mg, Oral, Daily   Followed by  Gabo Curiel ON 2/27/2023] predniSONE, 20 mg, Oral, Daily   Followed by  Gabo Curiel ON 3/2/2023] predniSONE, 10 mg, Oral, Daily  [START ON 3/5/2023] predniSONE, 5 mg, Oral, Daily  senna-docusate sodium, 1 tablet, Oral, HS  tamsulosin, 0 4 mg, Oral, Daily With Dinner      Continuous IV Infusions:     PRN Meds:  polyethylene glycol, 17 g, Oral, Daily PRN  sodium chloride, 2 spray, Each Nare, Q1H PRN        Discharge Plan: TBD    Network Utilization Review Department  ATTENTION: Please call with any questions or concerns to [phone] and carefully listen to the prompts so that you are directed to the right person  All voicemails are confidential   Neldon Brain all requests for admission clinical reviews, approved or denied determinations and any other requests to dedicated fax number below belonging to the campus where the patient is receiving treatment   List of dedicated fax numbers for the Facilities:  [address] DENIALS (Administrative/Medical Necessity) [phone]   [address] (Maternity/NICU/Pediatrics) [phone]   [address] [phone]   [facility] [address] [phone]   [address] [address] Medical [place] [place] [address] Elan Cesar [facility] [phone]   [address] 815 [facility] [phone]

## 2023-02-21 NOTE — PROGRESS NOTES
Advanced Heart Failure/Pulmonary Hypertension - Attending Note - Ava Marie  74 y o  male MRN: [id number]      Please see complete HF note documented by the resident/fellow/AP; this is attending attestation  Assessment:  74 y o  male Hx per chart p/w SOB, severe COPD exacerbation, intubated in the field, now stepped down from ICU      Follows Dr Janeen Mercedes general cardiology  Has seen Dr Josh Salmon for HF  COPD, severe, (FEV1 22%, %, DLCO 45%), recommended for home hospice by pulm 1/2023; former smoker and possible chemical exposures at work  NICM, abnl EF x years, remote 35%>45%>now 25% while acutely ill in ICU, stage C, NYHA III  Has LBBB,   Soft BP as outpt  Palliative care discussions initiated as outpt  CAC on CT scan  HLD  KEVIN on bipap, noncompliant and machine may have been returned  Weight loss  Cachexia, BMI 19, frail  Bladder cancer      Reviewed all pertinent labs/imaging/data including:    Temp:  [97 8 °F (36 6 °C)-98 4 °F (36 9 °C)] 97 8 °F (36 6 °C)  HR:  [70-96] 77  Resp:  [16-17] 16  BP: ()/(55-63) 102/62     Weight (last 2 days)     Date/Time Weight    02/21/23 0732 46 3 (102 07)    02/20/23 0700 51 6 (113 76)    02/19/23 0600 52 (114 64)           Intake/Output Summary (Last 24 hours) at 2/21/2023 1323  Last data filed at 2/21/2023 1240  Gross per 24 hour   Intake 732 ml   Output 1325 ml   Net -593 ml       Drips:        Plan:  Warm, euvolemic, cachectic  Mild tachypnea at rest    gdmt below  Limited by bronchospastic lung dz, soft BP, GOC    Repeat echo before DC    Fu palliative recs    Fu pulm and primary team recs for  Lung dz    Neurohormonal Blockade/GDMT:  --Beta-Blocker: avoid 2/2 wheezing  --ACEi, ARB, ARNi: losartan 12 5 qd  --MRA: future maybe  --SGLT2i:  future maybe  --Hydralazine/nitrates: no    --Diuretic: lasix to 20 daily    Other HF pharmaco-invasive therapy (if applicable):  PPM,  ICD , CRT (if applicable): has LBBB ; no plan for invasive Tx now  Interrogation:  Advanced Therapies (if applicable): --Inotrope:  --LVAD/Transplant Candidacy: not a candidiate    Studies:  I have reviewed all pertinent patient data/labs/imaging where available, including but not limited to:    ECG  Echo  Stress  Cath  Thank you for the opportunity to participate in the care of this patient      Neha Sethi MD  Attending Physician  Advanced Heart Failure and Transplant Cardiology  [address] [place] [address]

## 2023-02-22 PROBLEM — R71.8 ELEVATED MCV: Status: ACTIVE | Noted: 2023-02-22

## 2023-02-22 LAB
ANION GAP SERPL CALCULATED.3IONS-SCNC: 5 MMOL/L (ref 4–13)
BUN SERPL-MCNC: 26 MG/DL (ref 5–25)
CALCIUM SERPL-MCNC: 8.5 MG/DL (ref 8.3–10.1)
CHLORIDE SERPL-SCNC: 89 MMOL/L (ref 96–108)
CO2 SERPL-SCNC: 37 MMOL/L (ref 21–32)
CREAT SERPL-MCNC: 0.37 MG/DL (ref 0.6–1.3)
FLUAV RNA RESP QL NAA+PROBE: NEGATIVE
FLUBV RNA RESP QL NAA+PROBE: NEGATIVE
FOLATE SERPL-MCNC: 9.8 NG/ML (ref 3.1–17.5)
GFR SERPL CREATININE-BSD FRML MDRD: 128 ML/MIN/1.73SQ M
GLUCOSE SERPL-MCNC: 102 MG/DL (ref 65–140)
GLUCOSE SERPL-MCNC: 122 MG/DL (ref 65–140)
GLUCOSE SERPL-MCNC: 135 MG/DL (ref 65–140)
GLUCOSE SERPL-MCNC: 163 MG/DL (ref 65–140)
GLUCOSE SERPL-MCNC: 94 MG/DL (ref 65–140)
POTASSIUM SERPL-SCNC: 3.3 MMOL/L (ref 3.5–5.3)
RSV RNA RESP QL NAA+PROBE: NEGATIVE
SARS-COV-2 RNA RESP QL NAA+PROBE: POSITIVE
SODIUM SERPL-SCNC: 131 MMOL/L (ref 135–147)
VIT B12 SERPL-MCNC: 528 PG/ML (ref 100–900)

## 2023-02-22 RX ORDER — POTASSIUM CHLORIDE 20 MEQ/1
40 TABLET, EXTENDED RELEASE ORAL ONCE
Status: COMPLETED | OUTPATIENT
Start: 2023-02-22 | End: 2023-02-22

## 2023-02-22 RX ORDER — ACETAMINOPHEN 325 MG/1
975 TABLET ORAL EVERY 8 HOURS PRN
Status: DISCONTINUED | OUTPATIENT
Start: 2023-02-22 | End: 2023-02-28 | Stop reason: HOSPADM

## 2023-02-22 RX ADMIN — IPRATROPIUM BROMIDE 0.5 MG: 0.5 SOLUTION RESPIRATORY (INHALATION) at 07:24

## 2023-02-22 RX ADMIN — PRAVASTATIN SODIUM 80 MG: 20 TABLET ORAL at 18:37

## 2023-02-22 RX ADMIN — ENOXAPARIN SODIUM 40 MG: 40 INJECTION SUBCUTANEOUS at 09:18

## 2023-02-22 RX ADMIN — DICLOFENAC SODIUM 2 G: 10 GEL TOPICAL at 12:01

## 2023-02-22 RX ADMIN — DICLOFENAC SODIUM 2 G: 10 GEL TOPICAL at 22:34

## 2023-02-22 RX ADMIN — TAMSULOSIN HYDROCHLORIDE 0.4 MG: 0.4 CAPSULE ORAL at 18:36

## 2023-02-22 RX ADMIN — LEVALBUTEROL HYDROCHLORIDE 1.25 MG: 1.25 SOLUTION, CONCENTRATE RESPIRATORY (INHALATION) at 15:08

## 2023-02-22 RX ADMIN — LEVALBUTEROL HYDROCHLORIDE 1.25 MG: 1.25 SOLUTION, CONCENTRATE RESPIRATORY (INHALATION) at 07:24

## 2023-02-22 RX ADMIN — IPRATROPIUM BROMIDE 0.5 MG: 0.5 SOLUTION RESPIRATORY (INHALATION) at 20:13

## 2023-02-22 RX ADMIN — FORMOTEROL FUMARATE DIHYDRATE 20 MCG: 20 SOLUTION RESPIRATORY (INHALATION) at 20:13

## 2023-02-22 RX ADMIN — CHLORHEXIDINE GLUCONATE 0.12% ORAL RINSE 15 ML: 1.2 LIQUID ORAL at 22:17

## 2023-02-22 RX ADMIN — POTASSIUM CHLORIDE 40 MEQ: 1500 TABLET, EXTENDED RELEASE ORAL at 09:18

## 2023-02-22 RX ADMIN — PREDNISONE 40 MG: 20 TABLET ORAL at 09:18

## 2023-02-22 RX ADMIN — ESCITALOPRAM OXALATE 5 MG: 5 TABLET, FILM COATED ORAL at 09:18

## 2023-02-22 RX ADMIN — CYANOCOBALAMIN TAB 500 MCG 1000 MCG: 500 TAB at 12:01

## 2023-02-22 RX ADMIN — IPRATROPIUM BROMIDE 0.5 MG: 0.5 SOLUTION RESPIRATORY (INHALATION) at 15:08

## 2023-02-22 RX ADMIN — LOSARTAN POTASSIUM 12.5 MG: 25 TABLET, FILM COATED ORAL at 09:20

## 2023-02-22 RX ADMIN — BUDESONIDE 0.5 MG: 0.5 INHALANT ORAL at 07:24

## 2023-02-22 RX ADMIN — FUROSEMIDE 20 MG: 20 TABLET ORAL at 09:18

## 2023-02-22 RX ADMIN — LEVALBUTEROL HYDROCHLORIDE 1.25 MG: 1.25 SOLUTION, CONCENTRATE RESPIRATORY (INHALATION) at 20:13

## 2023-02-22 RX ADMIN — DICLOFENAC SODIUM 2 G: 10 GEL TOPICAL at 09:25

## 2023-02-22 RX ADMIN — CHLORHEXIDINE GLUCONATE 0.12% ORAL RINSE 15 ML: 1.2 LIQUID ORAL at 09:18

## 2023-02-22 RX ADMIN — ACETAMINOPHEN 975 MG: 325 TABLET ORAL at 09:38

## 2023-02-22 RX ADMIN — FORMOTEROL FUMARATE DIHYDRATE 20 MCG: 20 SOLUTION RESPIRATORY (INHALATION) at 07:25

## 2023-02-22 RX ADMIN — Medication 20 MG: at 09:18

## 2023-02-22 RX ADMIN — DICLOFENAC SODIUM 2 G: 10 GEL TOPICAL at 18:36

## 2023-02-22 RX ADMIN — INSULIN LISPRO 1 UNITS: 100 INJECTION, SOLUTION INTRAVENOUS; SUBCUTANEOUS at 12:02

## 2023-02-22 RX ADMIN — ASPIRIN 81 MG CHEWABLE TABLET 81 MG: 81 TABLET CHEWABLE at 09:18

## 2023-02-22 RX ADMIN — SENNOSIDES AND DOCUSATE SODIUM 1 TABLET: 8.6; 5 TABLET ORAL at 22:17

## 2023-02-22 RX ADMIN — BUDESONIDE 0.5 MG: 0.5 INHALANT ORAL at 20:13

## 2023-02-22 NOTE — PROGRESS NOTES
[facility]  Progress Note - Anastacia Townsendty  1955, 74 y o  male MRN: [id number]  Unit/Bed#: [facility] 428-01 Encounter: [id number]  Primary Care Provider: Jasson Pate DO   Date and time admitted to hospital: 2/12/2023  5:07 AM    * Chronic obstructive pulmonary disease ([address] )  Assessment & Plan  · Follows with pulm as an outpatient  · Last FEV1 22%, consistent with very-severe COPD  · Home regiment:  · 3L O2 at home  · All nebs- Duo-Neb/Pulmicort/Perforomist  · Albuterol rescue inhaler  · Prednisone 5 daily  · Multiple prior exacerbations requiring admission  · Intubated 2/12, extubated 2/15    · Currently requiring 3 to 4 L supplemental O2  · Wean O2 as able  · Maintain O2 sats between 88-92%  · Continue bronchodilators-- scheduled Xopenx/Atrovent QID, perforomist/pulmicort BID  · Status post Solu-medrol 40 mg TID; switched to prednisone for 40 mg daily with taper  · Aggressive pulmonary hygiene- use of incentive spirometry, VEST/airway clearance, OOB to chair as able  · Palliative consulted, appreciate recommendations  · Goals of care discussed with patient with wife at bedside, patient to remain level 3 DNR/DNI       Acute on chronic HFrEF (heart failure with reduced ejection fraction) (HCC)  Assessment & Plan  · NYHA class: III  / ACC/AHA stage: C; in setting of very severe COPD (FEV1 22%)  · Follows with Dr Juliano Yan as an outpatient  · EF 40% PTA on lasix 20mg qd at home  · TTE on admission showing EF 20-25% with abnormal septal motion in setting of chronic LBBB, RVSP 38, mildly dilated RV  · Clinical presentation on arrival consistent with biventricular heart failure in setting of AECOPD  · Repeated TTE on 2/21 to reassess EF showed EF 30%      · Continue PO Lasix 40mg daily  · Goal-directed medical therapy  · Sudden cardiac death risk reduction:  · Not candidate for ICD/Life Vest candidacy due to DNR/DNI  · Of note, patient has known hx of LBBB with QRS around 120ms   · Cardiac diet, sodium restriction <2g, fluid restriction <1 5L  · Daily I&Os  · MAP goal >65   · Consulted heart failure service given newly decreased ejection fraction, appreciate recs  -Discontinued metoprolol due to wheezing  -Continue losartan 12 5 mg  -Plan to start MRA in the future  -Outpatient cardiology/heart failure follow-up    Physical deconditioning  Assessment & Plan  Evaluated by PT/OT with recommendation for postacute rehab  Case management working on rehab options with family  Anemia  Assessment & Plan  · Hgb low but stable in 7's  No overt signs of GI bleed  · Iron panel suggesting mixed iron deficiency with likely anemia of chronic disease contribution  · Retic index   74 suggesting hypoproliferation  · Elevated MCV  · Continue ferrous sulfate supplementation   · Trend CBC, transfuse if Hgb drops below 7  · Given patient has signs of numbness/tingling on the tip of the fingers, will start patient on vitamin B12 supplementation  · On DVT ppx     Protein-calorie malnutrition ([address] )  Assessment & Plan  · Patient noted to be cachectic/ill-appearing  · 2/2 catabolic illness/COPD   · Continue Ensure supplementations with daily meals      Acute on chronic respiratory failure with hypercapnia (HCC)  Assessment & Plan  · Chronically on 3 L at home  · Currently weaned down to 3 to 4 L supplemental O2  · Continue with treatment plan as above      Gastroesophageal reflux disease  Assessment & Plan  · Continue PPI    KEVIN and COPD overlap syndrome (HCC)  Assessment & Plan  · Uses BiPAP at home  · Continue BiPAP 12/6/40% qHs    Nonobstructive atherosclerosis of coronary artery  Assessment & Plan  · Coronary calcification seen on prior CT   · Follows with Dr Elida Hagan as an outpatient  · Continue with ASCVD prophylaxis with ASA and statin          VTE Pharmacologic Prophylaxis: VTE Score: 3 Moderate Risk (Score 3-4) - Pharmacological DVT Prophylaxis Ordered: enoxaparin (Lovenox)      Patient Centered Rounds: I performed bedside rounds with nursing staff today  Education and Discussions with Family / Patient: Updated  (wife) at bedside  Total Time Spent on Date of Encounter in care of patient: 35 minutes This time was spent on one or more of the following: performing physical exam; counseling and coordination of care; obtaining or reviewing history; documenting in the medical record; reviewing/ordering tests, medications or procedures; communicating with other healthcare professionals and discussing with patient's family/caregivers  Current Length of Stay: 10 day(s)  Current Patient Status: Inpatient   Certification Statement: The patient will continue to require additional inpatient hospital stay due to pending placement  Discharge Plan: Anticipate discharge in 24-48 hrs to rehab facility  Code Status: Level 3 - DNAR and DNI    Subjective:   Patient reports doing well today with no complaints  Pain, shortness of breath, fever, chills  However, patient reports numbness at the tips of her fingers which has been new during this hospitalization  Mild discomfort of left hip  Objective:     Vitals:   Temp (24hrs), Av 9 °F (36 6 °C), Min:97 6 °F (36 4 °C), Max:98 1 °F (36 7 °C)    Temp:  [97 6 °F (36 4 °C)-98 1 °F (36 7 °C)] 97 6 °F (36 4 °C)  HR:  [74-86] 86  BP: ()/(49-86) 97/52  SpO2:  [88 %-100 %] 98 %  Body mass index is 22 kg/m²  Input and Output Summary (last 24 hours): Intake/Output Summary (Last 24 hours) at 2023 1130  Last data filed at 2023 0847  Gross per 24 hour   Intake 702 ml   Output 850 ml   Net -148 ml       Physical Exam:   Physical Exam  Vitals and nursing note reviewed  Constitutional:       General: He is not in acute distress  Appearance: He is well-developed  He is cachectic  He is ill-appearing  Interventions: Nasal cannula in place  HENT:      Head: Normocephalic and atraumatic     Eyes:      Conjunctiva/sclera: Conjunctivae normal    Cardiovascular:      Rate and Rhythm: Normal rate and regular rhythm  Heart sounds: No murmur heard  Pulmonary:      Effort: Pulmonary effort is normal  No respiratory distress  Breath sounds: Normal breath sounds  Abdominal:      Palpations: Abdomen is soft  Tenderness: There is no abdominal tenderness  Musculoskeletal:         General: No swelling  Cervical back: Neck supple  Skin:     General: Skin is warm and dry  Capillary Refill: Capillary refill takes less than 2 seconds  Findings: Bruising (Left thoracic and mid back area) present  Neurological:      Mental Status: He is alert  Psychiatric:         Mood and Affect: Mood normal          Additional Data:     Labs:  Results from last 7 days   Lab Units 02/20/23 0442 02/17/23 0436 02/16/23 0447   WBC Thousand/uL 8 60   < > 10 56*   HEMOGLOBIN g/dL 8 1*   < > 7 3*   HEMATOCRIT % 25 9*   < > 23 5*   PLATELETS Thousands/uL 233   < > 185   NEUTROS PCT %  --   --  91*   LYMPHS PCT %  --   --  2*   MONOS PCT %  --   --  6   EOS PCT %  --   --  0    < > = values in this interval not displayed  Results from last 7 days   Lab Units 02/22/23 0451 02/17/23 0436 02/16/23 0447   SODIUM mmol/L 131*   < > 135   POTASSIUM mmol/L 3 3*   < > 3 8   CHLORIDE mmol/L 89*   < > 92*   CO2 mmol/L 37*   < > 42*   BUN mg/dL 26*   < > 30*   CREATININE mg/dL 0 37*   < > 0 37*   ANION GAP mmol/L 5   < > 1*   CALCIUM mg/dL 8 5   < > 8 7   ALBUMIN g/dL  --   --  3 1*   TOTAL BILIRUBIN mg/dL  --   --  0 59   ALK PHOS U/L  --   --  44*   ALT U/L  --   --  29   AST U/L  --   --  35   GLUCOSE RANDOM mg/dL 94   < > 141*    < > = values in this interval not displayed           Results from last 7 days   Lab Units 02/22/23  0541 02/21/23  2040 02/21/23  1600 02/21/23  1036 02/21/23  0545 02/20/23  2043 02/20/23  1553 02/20/23  1028 02/20/23  0632 02/19/23  2056 02/19/23  1639 02/19/23  1119   POC GLUCOSE mg/dl 102 133 202* 110 146* 134 155* 313* 153* 204* 145* 171*         Results from last 7 days   Lab Units 02/17/23  0436   PROCALCITONIN ng/ml 0 09       Lines/Drains:  Invasive Devices     Peripheral Intravenous Line  Duration           Peripheral IV 02/21/23 Left;Proximal;Ventral (anterior) Forearm 1 day                      Imaging: No pertinent imaging reviewed      Recent Cultures (last 7 days):         Last 24 Hours Medication List:   Current Facility-Administered Medications   Medication Dose Route Frequency Provider Last Rate   • acetaminophen  975 mg Oral Q8H PRN Sushant Hilliard, DO     • aspirin  81 mg Oral Daily Kamron Ye, DO     • budesonide  0 5 mg Nebulization Q12H Everlene Smaller, DO     • chlorhexidine  15 mL Mouth/Throat Q12H [facility] Ye, DO     • vitamin B-12  1,000 mcg Oral Daily Lazara Moser MD     • Diclofenac Sodium  2 g Topical 4x Daily Everlene Smaller, DO     • enoxaparin  40 mg Subcutaneous Daily Everlene Smaller, DO     • ergocalciferol  50,000 Units Oral Q28 Days Everlene Smaller, DO     • escitalopram  5 mg Oral Daily Everlene Smaller, DO     • ferrous sulfate  325 mg Oral Every Other Day Everlene Smaller, DO     • formoterol  20 mcg Nebulization Q12H Everlene Smaller, DO     • furosemide  20 mg Oral Daily Everlene Smaller, DO     • insulin lispro  1-5 Units Subcutaneous TID [facility], [facility]     • insulin lispro  1-5 Units Subcutaneous [facility], DO     • levalbuterol  1 25 mg Nebulization TID Everlene Smaller, DO      And   • ipratropium  0 5 mg Nebulization TID Everlene Smaller, DO     • losartan  12 5 mg Oral Daily Odalys Flores PA-C     • omeprazole (PRILOSEC) suspension 2 mg/mL  20 mg Oral Daily Everlene Smaller, DO     • polyethylene glycol  17 g Oral Daily PRN Everlene Smaller, DO     • pravastatin  80 mg Oral Daily With [address] [facility] [address] Ye, DO     • predniSONE  40 mg Oral Daily Jaqui Mcguire, DO      Followed by   • [START ON 2/24/2023] predniSONE  30 mg Oral Daily Sarah Freeman, DO      Followed by   • [START ON 2/27/2023] predniSONE  20 mg Oral Daily Sarah Freeman, DO      Followed by   • [START ON 3/2/2023] predniSONE  10 mg Oral Daily Sarah Freeman, DO     • [START ON 3/5/2023] predniSONE  5 mg Oral Daily Sarah Freeman, DO     • senna-docusate sodium  1 tablet Oral HS Bhupinder Paci, DO     • sodium chloride  2 spray Each Nare Q1H PRN Jean Spencer, DO     • tamsulosin  0 4 mg Oral Daily With [address] [employer], DO          Today, Patient Was Seen By: Rashaad Moser MD    **Please Note: This note may have been constructed using a voice recognition system  **

## 2023-02-22 NOTE — QUICK NOTE
2/22/2023 3:19 PM -  Karen Padilla Jr 's chart and case were reviewed by Alonso Rain MD   Mode of review included electronic chart check  Patient scheduled for discharge though, delayed due to COVID + on routine screening  Goals of care/code status clarified  [facility] will sign off at this time though please do not hesitate to reach out to our team should patient's clinical status deteriorate  For urgent issues or any questions/concerns, please notify on-call provider via [facility]  You may also call our answering service 24/7 at   Alonso Rain MD  Palliative and Supportive Care  Clinic/Answering Service: [phone]  You can find me on [employer]!

## 2023-02-22 NOTE — CASE MANAGEMENT
Case Management Discharge Planning Note    Patient name Jean Daigle PPHP 428/PPHP 428-01 MRN [id number]  : 1955 Date 2023       Current Admission Date: 2023  Current Admission Diagnosis:Chronic obstructive pulmonary disease [facility])   Patient Active Problem List    Diagnosis Date Noted   • Physical deconditioning 2023   • Anemia 2023   • Acute on chronic HFrEF (heart failure with reduced ejection fraction) ([email or web address] ) 2022   • Protein-calorie malnutrition ([email or web address] ) 2022   • Pulmonary nodules/lesions, multiple 2022   • Prostate cancer ([facility] ) 2021   • BPH with obstruction/lower urinary tract symptoms 2021   • Acute on chronic respiratory failure with hypercapnia ([facility] ) 2020   • Macrocytosis without anemia 2019   • Other insomnia 2019   • Gastroesophageal reflux disease 2017   • Nonobstructive atherosclerosis of coronary artery 2016   • Mood disorder ([email or web address] ) 2015   • Impaired fasting glucose 2015   • Osteoporosis 10/14/2014   • Vitamin D deficiency 10/14/2014   • Nonischemic cardiomyopathy ([facility] ) 2014   • Left bundle-branch block 2013   • Osteoarthritis 2013   • KEVIN and COPD overlap syndrome ([facility] ) 2013   • Chronic obstructive pulmonary disease ([facility] ) 2012      LOS (days): 10  Geometric Mean LOS (GMLOS) (days): 5 00  Days to GMLOS:-5 3     OBJECTIVE:  Risk of Unplanned Readmission Score: 27 17         Current admission status: Inpatient   [facility] Pharmacy:   [id number]  [place] [address]  Phone: [phone] Fax: [phone]    Primary Care Provider: Paddy Valdez DO    Primary Insurance: BLUE CROSS  Secondary Insurance: MEDICARE    DISCHARGE DETAILS:    Discharge planning discussed with[de-identified] Patient and spouse at bedside                                     Other Referral/Resources/Interventions Provided:  Interventions: Acute Rehab  Referral Comments: Authorization has been received for pt's transfer to [facility] today -- Unfortunately, pt tested positive before his scheduled 3pm transport  As such [facility] admissions representative Shirleysarah Rader reports that their doctor would like [facility] to evaluate pt to determine whether his increased O2 demand (3 -> 4 L/min) is related to Covid, or any other increase in breathing issues  [facility] confirmed they would consult pulmonary for their input -- CM will cancel transport and re-schedule once confirmation is received that pt may admit tomorrow, 2/23/2023 (pending outcome of pulm's findings and [facility]'s approval)  Spouse updated           Treatment Team Recommendation: Acute Rehab  Discharge Destination Plan[de-identified] Acute Rehab

## 2023-02-23 ENCOUNTER — APPOINTMENT (INPATIENT)
Dept: RADIOLOGY | Facility: HOSPITAL | Age: 68
End: 2023-02-23

## 2023-02-23 PROBLEM — U07.1 COVID-19 VIRUS INFECTION: Status: ACTIVE | Noted: 2023-02-23

## 2023-02-23 LAB
ANION GAP SERPL CALCULATED.3IONS-SCNC: 5 MMOL/L (ref 4–13)
BASOPHILS # BLD AUTO: 0.01 THOUSANDS/ÂΜL (ref 0–0.1)
BASOPHILS NFR BLD AUTO: 0 % (ref 0–1)
BUN SERPL-MCNC: 14 MG/DL (ref 5–25)
CALCIUM SERPL-MCNC: 8.7 MG/DL (ref 8.3–10.1)
CHLORIDE SERPL-SCNC: 90 MMOL/L (ref 96–108)
CO2 SERPL-SCNC: 33 MMOL/L (ref 21–32)
CREAT SERPL-MCNC: 0.44 MG/DL (ref 0.6–1.3)
EOSINOPHIL # BLD AUTO: 0.04 THOUSAND/ÂΜL (ref 0–0.61)
EOSINOPHIL NFR BLD AUTO: 0 % (ref 0–6)
ERYTHROCYTE [DISTWIDTH] IN BLOOD BY AUTOMATED COUNT: 13 % (ref 11.6–15.1)
GFR SERPL CREATININE-BSD FRML MDRD: 119 ML/MIN/1.73SQ M
GLUCOSE SERPL-MCNC: 106 MG/DL (ref 65–140)
GLUCOSE SERPL-MCNC: 108 MG/DL (ref 65–140)
GLUCOSE SERPL-MCNC: 211 MG/DL (ref 65–140)
GLUCOSE SERPL-MCNC: 241 MG/DL (ref 65–140)
GLUCOSE SERPL-MCNC: 91 MG/DL (ref 65–140)
HCT VFR BLD AUTO: 31.7 % (ref 36.5–49.3)
HGB BLD-MCNC: 9.9 G/DL (ref 12–17)
IMM GRANULOCYTES # BLD AUTO: 0.04 THOUSAND/UL (ref 0–0.2)
IMM GRANULOCYTES NFR BLD AUTO: 0 % (ref 0–2)
LYMPHOCYTES # BLD AUTO: 0.69 THOUSANDS/ÂΜL (ref 0.6–4.47)
LYMPHOCYTES NFR BLD AUTO: 7 % (ref 14–44)
MCH RBC QN AUTO: 31 PG (ref 26.8–34.3)
MCHC RBC AUTO-ENTMCNC: 31.2 G/DL (ref 31.4–37.4)
MCV RBC AUTO: 99 FL (ref 82–98)
MONOCYTES # BLD AUTO: 1.41 THOUSAND/ÂΜL (ref 0.17–1.22)
MONOCYTES NFR BLD AUTO: 14 % (ref 4–12)
NEUTROPHILS # BLD AUTO: 7.94 THOUSANDS/ÂΜL (ref 1.85–7.62)
NEUTS SEG NFR BLD AUTO: 79 % (ref 43–75)
NRBC BLD AUTO-RTO: 0 /100 WBCS
PLATELET # BLD AUTO: 281 THOUSANDS/UL (ref 149–390)
PMV BLD AUTO: 9.8 FL (ref 8.9–12.7)
POTASSIUM SERPL-SCNC: 4 MMOL/L (ref 3.5–5.3)
RBC # BLD AUTO: 3.19 MILLION/UL (ref 3.88–5.62)
SODIUM SERPL-SCNC: 128 MMOL/L (ref 135–147)
WBC # BLD AUTO: 10.13 THOUSAND/UL (ref 4.31–10.16)

## 2023-02-23 RX ORDER — GUAIFENESIN 600 MG/1
600 TABLET, EXTENDED RELEASE ORAL EVERY 12 HOURS SCHEDULED
Status: DISCONTINUED | OUTPATIENT
Start: 2023-02-23 | End: 2023-02-28 | Stop reason: HOSPADM

## 2023-02-23 RX ORDER — ALBUTEROL SULFATE 90 UG/1
2 AEROSOL, METERED RESPIRATORY (INHALATION) EVERY 4 HOURS PRN
Status: DISCONTINUED | OUTPATIENT
Start: 2023-02-23 | End: 2023-02-28 | Stop reason: HOSPADM

## 2023-02-23 RX ORDER — AZITHROMYCIN 500 MG/1
500 TABLET, FILM COATED ORAL EVERY 24 HOURS
Status: DISCONTINUED | OUTPATIENT
Start: 2023-02-23 | End: 2023-02-24

## 2023-02-23 RX ADMIN — GUAIFENESIN 600 MG: 600 TABLET, EXTENDED RELEASE ORAL at 22:06

## 2023-02-23 RX ADMIN — AZITHROMYCIN MONOHYDRATE 500 MG: 250 TABLET ORAL at 18:11

## 2023-02-23 RX ADMIN — LEVALBUTEROL HYDROCHLORIDE 1.25 MG: 1.25 SOLUTION, CONCENTRATE RESPIRATORY (INHALATION) at 06:35

## 2023-02-23 RX ADMIN — CHLORHEXIDINE GLUCONATE 0.12% ORAL RINSE 15 ML: 1.2 LIQUID ORAL at 11:23

## 2023-02-23 RX ADMIN — CHLORHEXIDINE GLUCONATE 0.12% ORAL RINSE 15 ML: 1.2 LIQUID ORAL at 22:06

## 2023-02-23 RX ADMIN — FUROSEMIDE 20 MG: 20 TABLET ORAL at 11:25

## 2023-02-23 RX ADMIN — INSULIN LISPRO 2 UNITS: 100 INJECTION, SOLUTION INTRAVENOUS; SUBCUTANEOUS at 18:11

## 2023-02-23 RX ADMIN — INSULIN LISPRO 1 UNITS: 100 INJECTION, SOLUTION INTRAVENOUS; SUBCUTANEOUS at 22:06

## 2023-02-23 RX ADMIN — BUDESONIDE 0.5 MG: 0.5 INHALANT ORAL at 06:35

## 2023-02-23 RX ADMIN — BUDESONIDE 0.5 MG: 0.5 INHALANT ORAL at 20:21

## 2023-02-23 RX ADMIN — PRAVASTATIN SODIUM 80 MG: 20 TABLET ORAL at 18:10

## 2023-02-23 RX ADMIN — LEVALBUTEROL HYDROCHLORIDE 1.25 MG: 1.25 SOLUTION, CONCENTRATE RESPIRATORY (INHALATION) at 20:21

## 2023-02-23 RX ADMIN — IPRATROPIUM BROMIDE 0.5 MG: 0.5 SOLUTION RESPIRATORY (INHALATION) at 06:35

## 2023-02-23 RX ADMIN — DICLOFENAC SODIUM 2 G: 10 GEL TOPICAL at 22:06

## 2023-02-23 RX ADMIN — DICLOFENAC SODIUM 2 G: 10 GEL TOPICAL at 11:24

## 2023-02-23 RX ADMIN — TAMSULOSIN HYDROCHLORIDE 0.4 MG: 0.4 CAPSULE ORAL at 18:11

## 2023-02-23 RX ADMIN — SENNOSIDES AND DOCUSATE SODIUM 1 TABLET: 8.6; 5 TABLET ORAL at 22:06

## 2023-02-23 RX ADMIN — Medication 20 MG: at 11:25

## 2023-02-23 RX ADMIN — DICLOFENAC SODIUM 2 G: 10 GEL TOPICAL at 18:11

## 2023-02-23 RX ADMIN — POLYETHYLENE GLYCOL 3350 17 G: 17 POWDER, FOR SOLUTION ORAL at 11:26

## 2023-02-23 RX ADMIN — ALBUTEROL SULFATE 2 PUFF: 90 AEROSOL, METERED RESPIRATORY (INHALATION) at 17:11

## 2023-02-23 RX ADMIN — LEVALBUTEROL HYDROCHLORIDE 1.25 MG: 1.25 SOLUTION, CONCENTRATE RESPIRATORY (INHALATION) at 13:08

## 2023-02-23 RX ADMIN — GUAIFENESIN 600 MG: 600 TABLET, EXTENDED RELEASE ORAL at 11:27

## 2023-02-23 RX ADMIN — ENOXAPARIN SODIUM 40 MG: 40 INJECTION SUBCUTANEOUS at 11:21

## 2023-02-23 RX ADMIN — CYANOCOBALAMIN TAB 500 MCG 1000 MCG: 500 TAB at 11:24

## 2023-02-23 RX ADMIN — FERROUS SULFATE TAB 325 MG (65 MG ELEMENTAL FE) 325 MG: 325 (65 FE) TAB at 11:21

## 2023-02-23 RX ADMIN — IPRATROPIUM BROMIDE 0.5 MG: 0.5 SOLUTION RESPIRATORY (INHALATION) at 20:21

## 2023-02-23 RX ADMIN — IPRATROPIUM BROMIDE 0.5 MG: 0.5 SOLUTION RESPIRATORY (INHALATION) at 13:08

## 2023-02-23 RX ADMIN — LOSARTAN POTASSIUM 12.5 MG: 25 TABLET, FILM COATED ORAL at 11:26

## 2023-02-23 RX ADMIN — PREDNISONE 40 MG: 20 TABLET ORAL at 11:25

## 2023-02-23 RX ADMIN — ASPIRIN 81 MG CHEWABLE TABLET 81 MG: 81 TABLET CHEWABLE at 11:23

## 2023-02-23 RX ADMIN — FORMOTEROL FUMARATE DIHYDRATE 20 MCG: 20 SOLUTION RESPIRATORY (INHALATION) at 06:35

## 2023-02-23 RX ADMIN — ESCITALOPRAM OXALATE 5 MG: 5 TABLET, FILM COATED ORAL at 11:25

## 2023-02-23 NOTE — OCCUPATIONAL THERAPY NOTE
Occupational Therapy Progress Note     Patient Name: Pauline Arnold  Today's Date: 2/23/2023  Problem List  Principal Problem:    Chronic obstructive pulmonary disease ([address] )  Active Problems:    Nonobstructive atherosclerosis of coronary artery    KEVIN and COPD overlap syndrome (HCC)    Gastroesophageal reflux disease    Acute on chronic respiratory failure with hypercapnia (HCC)    Protein-calorie malnutrition (HCC)    Acute on chronic HFrEF (heart failure with reduced ejection fraction) (HCC)    Anemia    Physical deconditioning              02/23/23 1127   OT Last Visit   OT Visit Date 02/23/23   Note Type   Note Type Treatment   Pain Assessment   Pain Assessment Tool FLACC   Pain Rating: FLACC (Rest) - Face 0   Pain Rating: FLACC (Rest) - Legs 0   Pain Rating: FLACC (Rest) - Activity 0   Pain Rating: FLACC (Rest) - Cry 0   Pain Rating: FLACC (Rest) - Consolability 1   Score: FLACC (Rest) 1   Pain Rating: FLACC (Activity) - Face 0   Pain Rating: FLACC (Activity) - Legs 0   Pain Rating: FLACC (Activity) - Activity 1   Pain Rating: FLACC (Activity) - Cry 1   Pain Rating: FLACC (Activity) - Consolability 1   Score: FLACC (Activity) 3   Restrictions/Precautions   Weight Bearing Precautions Per Order No   Other Precautions Contact/isolation; Chair Alarm; Bed Alarm;Telemetry;O2;Fall Risk;Pain  (3 5 L of O2)   Lifestyle   Autonomy Pt reports being I in ADLS, IADLs, and does not use DME at baseline  (+)   Reciprocal Relationships Pt lives with his wife and son who can A if needed  Service to Others Pt is retired  Intrinsic Gratification Pt enjoys putting together tiny models  ADL   Where Assessed Chair   Grooming Assistance 4  Minimal Assistance   Grooming Deficit Setup; Wash/dry hands; Wash/dry face; Teeth care;Brushing hair   Grooming Comments deferred walking into bathroom due to decreased endurance   Toileting Assistance  3  Moderate Assistance   Toileting Deficit Perineal hygiene   Functional Standing Tolerance   Time ~ 3 min   Activity static standing   Comments Pt was able to tolerate ~ 3 mins of standing tolerance to take weight and for hygiene care  Bed Mobility   Supine to Sit 4  Minimal assistance   Additional items Assist x 1; Increased time required   Sit to Supine Unable to assess   Additional Comments Pt was supine in bed upon arrival  pt was left sitting in chair at the end of the session with all necessary items within reach and chair alarm on  Transfers   Sit to Stand 4  Minimal assistance   Additional items Assist x 1; Increased time required   Stand to Sit 4  Minimal assistance   Additional items Assist x 1; Increased time required   Additional Comments b/l HHA   Functional Mobility   Functional Mobility 3  Moderate assistance   Additional Comments Ax1  Pt was able to demonstrate a couple steps from bed to chair with HHA  Additional items Hand hold assistance   Subjective   Subjective "i feel like I cannot catch my breath"   Cognition   Overall Cognitive Status WFL   Arousal/Participation Alert; Cooperative   Attention Attends with cues to redirect   Orientation Level Oriented X4   Memory Decreased recall of precautions;Decreased recall of recent events   Following Commands Follows one step commands with increased time or repetition   Comments Pt was pleasant and cooperative t/o session  Pt was educated on energy conservation  Activity Tolerance   Activity Tolerance Patient limited by fatigue   Medical Staff Made Aware RN made aware   Assessment   Assessment Pt was seen on 2/23/2023 for skilled OT session  OT session was focused on ADLs, functional mobility, functional transfers, static/dynamic balance, safety awareness, education, increased activity tolerance, and energy conservation  Pt was agreeable to OT session  Pt was able to demonstrate transfers and bed mobility with min Ax1  Pt was able to tolerate static standing for ~ 3 mins   Pt was able to take a couple steps from bed to chair with mod Ax1, HHA  Pt was able to complete grooming tasks with min A  Pt is limited 2* pain, endurance, activity tolerance, functional mobility, functional standing tolerance and decreased I w/ ADLS/IADLS  The following Occupational Performance Areas to address include: eating, grooming, bathing/shower, toilet hygiene, dressing, health maintenance and functional mobility  Pt will continue to benefit from skilled OT services 3-5x a wk to address functional goals  The patient's raw score on the AM-PAC Daily Activity Inpatient Short Form is 17  A raw score of less than 19 suggests the patient may benefit from discharge to post-acute rehabilitation services  Please refer to the recommendation of the Occupational Therapist for safe discharge planning  OT recommendations for d/c include post acute rehab  Plan   Treatment Interventions ADL retraining;Functional transfer training; Endurance training;Patient/family training;Equipment evaluation/education; Compensatory technique education; Energy conservation; Activityengagement   Goal Expiration Date 03/06/23   OT Treatment Day 1   OT Frequency 3-5x/wk   Recommendation   OT Discharge Recommendation Post acute rehabilitation services   AM-[facility] Daily Activity Inpatient   Lower Body Dressing 2   Bathing 2   Toileting 2   Upper Body Dressing 3   Grooming 4   Eating 4   Daily Activity Raw Score 17   Daily Activity Standardized Score (Calc for Raw Score >=11) 37 26   AM-PAC Applied Cognition Inpatient   Following a Speech/Presentation 4   Understanding Ordinary Conversation 4   Taking Medications 4   Remembering Where Things Are Placed or Put Away 4   Remembering List of 4-5 Errands 3   Taking Care of Complicated Tasks 3   Applied Cognition Raw Score 22   Applied Cognition Standardized Score 47 83   End of Consult   Education Provided Yes;Family or social support of family present for education by provider   Patient Position at End of Consult Bedside chair;Bed/Chair alarm activated; All needs within reach   Nurse Communication Nurse aware of consult     Keara Fonseca, OTR/L

## 2023-02-23 NOTE — CASE MANAGEMENT
Case Management Discharge Planning Note    Patient name Warner Elias  [facility] MRN [id number]  : 1955 Date 2023       Current Admission Date: 2023  Current Admission Diagnosis:Chronic obstructive pulmonary disease [facility])   Patient Active Problem List    Diagnosis Date Noted   • Physical deconditioning 2023   • Anemia 2023   • Acute on chronic HFrEF (heart failure with reduced ejection fraction) ([facility] ) 2022   • [facility]calorie [facility] ) 2022   • Pulmonary nodules/lesions, multiple 2022   • Prostate cancer ([facility] ) 2021   • BPH with obstruction/lower urinary tract symptoms 2021   • Acute on chronic respiratory failure with hypercapnia ([facility] ) 2020   • Macrocytosis without anemia 2019   • Other insomnia 2019   • Gastroesophageal reflux disease 2017   • Nonobstructive atherosclerosis of coronary artery 2016   • Mood disorder ([facility] ) 2015   • Impaired fasting glucose 2015   • Osteoporosis 10/14/2014   • Vitamin D deficiency 10/14/2014   • Nonischemic cardiomyopathy ([facility] ) 2014   • Left bundle-branch block 2013   • Osteoarthritis 2013   • KEVIN and COPD overlap syndrome ([facility] ) 2013   • Chronic obstructive pulmonary disease ([facility] ) 2012      LOS (days): 11  Geometric Mean LOS (GMLOS) (days): 5 00  Days to GMLOS:-6 3     OBJECTIVE:  Risk of Unplanned Readmission Score: 25 64         Current admission status: Inpatient   [facility]acy[facility]   [id number]  [facility] [address]  Phone: [phone] Fax: [phone]    Primary Care Provider: Indy Davis DO    Primary Insurance: BLUE CROSS  Secondary Insurance: MEDICARE    DISCHARGE DETAILS:                      Pt requires ongoing hospitalization for con't observation, per provider   Pt may be medically stable for d/c tomorrow -- Chacorta Gutierrez @ [facility] advised of same  She will follow for possible admission tomorrow  CM will follow and arrange transportation accordingly

## 2023-02-23 NOTE — CASE MANAGEMENT
Case Management Discharge Planning Note    Patient name Anastacia Daigle [facility] 419/[facility] 419-01 MRN [id number]  : 1955 Date 2023       Current Admission Date: 2023  Current Admission Diagnosis:Chronic obstructive pulmonary disease [facility])   Patient Active Problem List    Diagnosis Date Noted   • COVID-19 virus infection 2023   • Physical deconditioning 2023   • Anemia 2023   • [facility] with [facility] 2022   • Protein-calorie malnutrition [facility] ) 2022   • Pulmonary nodules/lesions, multiple 2022   • Prostate cancer ([facility] ) 2021   • BPH with obstruction/lower urinary tract symptoms 2021   • Acute on chronic respiratory failure with [facility]capnia [facility] ) 2020   • Macrocytosis without anemia 2019   • Other insomnia 2019   • Gastroesophageal reflux disease 2017   • Nonobstructive atherosclerosis of coronary artery 2016   • [facility] 2015   • Impaired fasting glucose 2015   • Osteoporosis 10/14/2014   • Vitamin D deficiency 10/14/2014   • Nonischemic cardiomyopathy ([facility] ) 2014   • Left bundle-branch block 2013   • Osteoarthritis 2013   • KEVIN and COPD overlap syndrome ([facility] ) 2013   • Chronic obstructive pulmonary disease ([facility] ) 2012      LOS (days): 11  Geometric Mean LOS (GMLOS) (days): 5 00  Days to GMLOS:-6 4     OBJECTIVE:  Risk of Unplanned Readmission Score: 25 28         Current admission status: Inpatient   [facility]erred Pharmacy[facility]   [id number]  [facility] [address]  Phone: [phone] Fax: [phone]    Primary Care Provider: Jasson Pate DO    Primary Insurance: BLUE CROSS  Secondary Insurance: MEDICARE    DISCHARGE DETAILS:                                                                                        IMM Given (Date):: 23 (Copy given and reviewed verbally;  Pt is covid positive )  IMM Given to[de-identified] Patient

## 2023-02-23 NOTE — PROGRESS NOTES
[facility]  Progress Note - Tang Quintero  1955, 74 y o  male MRN: [id number]  Unit/Bed#: [facility] 419-01 Encounter: [id number]  Primary Care Provider: Camille Ram DO   Date and time admitted to hospital: 2/12/2023  5:07 AM    * Chronic obstructive pulmonary disease ([address] )  Assessment & Plan  · Follows with pulm as an outpatient  · Last FEV1 22%, consistent with very-severe COPD  · Home regimen:  · 3L O2 at home  · All nebs- Duo-Neb/Pulmicort/Perforomist  · Albuterol rescue inhaler  · Prednisone 5 daily  · Multiple prior exacerbations requiring admission  · Intubated 2/12, extubated 2/15    · Currently requiring 3 to 4 L supplemental O2  · Wean O2 as able  · Maintain O2 sats between 88-92%  · Continue bronchodilators-- scheduled Xopenx/Atrovent QID, perforomist/pulmicort BID  · Status post Solu-medrol 40 mg TID; switched to prednisone for 40 mg daily with taper  · Aggressive pulmonary hygiene- use of incentive spirometry, VEST/airway clearance, OOB to chair as able  · Palliative consulted, appreciate recommendations  · Goals of care discussed with patient with wife at bedside, patient to remain level 3 DNR/DNI       Acute on chronic HFrEF (heart failure with reduced ejection fraction) (HCC)  Assessment & Plan  · NYHA class: III  / ACC/AHA stage: C; in setting of very severe COPD (FEV1 22%)  · Follows with Dr José Miguel Moreau as an outpatient  · EF 40% PTA on lasix 20mg qd at home  · TTE on admission showing EF 20-25% with abnormal septal motion in setting of chronic LBBB, RVSP 38, mildly dilated RV  · Clinical presentation on arrival consistent with biventricular heart failure in setting of AECOPD  · Repeated TTE on 2/21 to reassess EF showed EF 30%      · Continue PO Lasix 40mg daily  · Goal-directed medical therapy  · Sudden cardiac death risk reduction:  · Not candidate for ICD/Life Vest candidacy due to DNR/DNI  · Of note, patient has known hx of LBBB with QRS around 120ms   · Cardiac diet, sodium restriction <2g, fluid restriction <1 5L  · Daily I&Os  · MAP goal >65   · Consulted heart failure service given newly decreased ejection fraction, appreciate recs  -Discontinued metoprolol due to wheezing  -Continue losartan 12 5 mg  -Plan to start MRA in the future  -Outpatient cardiology/heart failure follow-up    COVID-19 virus infection  Assessment & Plan  Patient tested positive for COVID on 2/26/2023  Patient with baseline oxygen requirements at 3 L nasal cannula  · Monitor WBC and fever curve  · Continue oxygen segmentation to maintain SPO2 >88%  · Mucinex ordered  · Chest x-ray ordered today, pending official results  · We will start azithromycin x 5 days today  · Procal AM    Physical deconditioning  Assessment & Plan  Evaluated by PT/OT with recommendation for postacute rehab  Case management working on rehab options with family  Anemia  Assessment & Plan  · Hgb low but stable in 7's  No overt signs of GI bleed  · Iron panel suggesting mixed iron deficiency with likely anemia of chronic disease contribution  · Retic index   74 suggesting hypoproliferation  · Elevated MCV  · Continue ferrous sulfate supplementation   · Trend CBC, transfuse if Hgb drops below 7  · Given patient has signs of numbness/tingling on the tip of the fingers, will start patient on vitamin B12 supplementation  · On DVT ppx     Protein-calorie malnutrition ([address] )  Assessment & Plan  · Patient noted to be cachectic/ill-appearing    · 2/2 catabolic illness/COPD   · Continue Ensure supplementations with daily meals      Acute on chronic respiratory failure with hypercapnia (HCC)  Assessment & Plan  · Chronically on 3 L at home  · Currently weaned down to 3 to 4 L supplemental O2  · Continue with treatment plan as above      Gastroesophageal reflux disease  Assessment & Plan  · Continue PPI    KEVIN and COPD overlap syndrome (HCC)  Assessment & Plan  · Uses BiPAP at home  · Continue BiPAP 12/6/40% qHs    Nonischemic cardiomyopathy [facility])  Assessment & Plan  Speech box speech box    Nonobstructive atherosclerosis of coronary artery  Assessment & Plan  · Coronary calcification seen on prior CT   · Follows with Dr Elida Hagan as an outpatient  · Continue with ASCVD prophylaxis with ASA and statin      Hyponatremia-resolved as of 2023  Assessment & Plan  Sodium 128 today  Continue to monitor  If worsening, will place for further work-up  VTE Pharmacologic Prophylaxis: VTE Score: 3 Moderate Risk (Score 3-4) - Pharmacological DVT Prophylaxis Ordered: enoxaparin (Lovenox)  Patient Centered Rounds: I performed bedside rounds with nursing staff today  Education and Discussions with Family / Patient: Updated  (wife) at bedside  Total Time Spent on Date of Encounter in care of patient: 35 minutes This time was spent on one or more of the following: performing physical exam; counseling and coordination of care; obtaining or reviewing history; documenting in the medical record; reviewing/ordering tests, medications or procedures; communicating with other healthcare professionals and discussing with patient's family/caregivers  Current Length of Stay: 11 day(s)  Current Patient Status: Inpatient   Certification Statement: The patient will continue to require additional inpatient hospital stay due to acute on chronic respiratory failure  Discharge Plan: Anticipate discharge in 24-48 hrs to rehab facility  Code Status: Level 3 - DNAR and DNI    Subjective:   Patient seen and evaluated at bedside while PT on the room  Patient endorses worsening of respiratory status  However, patient on baseline oxygen saturating well  Patient reports a new cough, no sputum  Denies fever, chills, chest pain       Objective:     Vitals:   Temp (24hrs), Av 8 °F (36 6 °C), Min:97 2 °F (36 2 °C), Max:98 7 °F (37 1 °C)    Temp:  [97 2 °F (36 2 °C)-98 7 °F (37 1 °C)] 98 7 °F (37 1 °C)  HR:  Carmela Stanford 84  Resp:  [20] 20  BP: ()/(51-54) 100/54  SpO2:  [96 %-100 %] 99 %  Body mass index is 21 36 kg/m²  Input and Output Summary (last 24 hours): Intake/Output Summary (Last 24 hours) at 2/23/2023 1520  Last data filed at 2/23/2023 1258  Gross per 24 hour   Intake 300 ml   Output 900 ml   Net -600 ml       Physical Exam:   Physical Exam  Vitals and nursing note reviewed  Constitutional:       General: He is not in acute distress  Appearance: He is well-developed  He is cachectic  He is ill-appearing  HENT:      Head: Normocephalic and atraumatic  Eyes:      Conjunctiva/sclera: Conjunctivae normal    Cardiovascular:      Rate and Rhythm: Normal rate and regular rhythm  Heart sounds: No murmur heard  Pulmonary:      Effort: Respiratory distress present  Breath sounds: Wheezing and rales present  Abdominal:      Palpations: Abdomen is soft  Tenderness: There is no abdominal tenderness  Musculoskeletal:         General: No swelling  Cervical back: Neck supple  Skin:     General: Skin is warm and dry  Capillary Refill: Capillary refill takes less than 2 seconds  Neurological:      Mental Status: He is alert     Psychiatric:         Mood and Affect: Mood normal          Additional Data:     Labs:  Results from last 7 days   Lab Units 02/23/23  1154   WBC Thousand/uL 10 13   HEMOGLOBIN g/dL 9 9*   HEMATOCRIT % 31 7*   PLATELETS Thousands/uL 281   NEUTROS PCT % 79*   LYMPHS PCT % 7*   MONOS PCT % 14*   EOS PCT % 0     Results from last 7 days   Lab Units 02/23/23  1154   SODIUM mmol/L 128*   POTASSIUM mmol/L 4 0   CHLORIDE mmol/L 90*   CO2 mmol/L 33*   BUN mg/dL 14   CREATININE mg/dL 0 44*   ANION GAP mmol/L 5   CALCIUM mg/dL 8 7   GLUCOSE RANDOM mg/dL 106         Results from last 7 days   Lab Units 02/23/23  1131 02/23/23  0647 02/22/23  2216 02/22/23  1603 02/22/23  1157 02/22/23  0541 02/21/23  2040 02/21/23  1600 02/21/23  1036 02/21/23  0545 02/20/23 2043 02/20/23  1553   POC GLUCOSE mg/dl 108 91 135 122 163* 102 133 202* 110 146* 134 155*         Results from last 7 days   Lab Units 02/17/23  0436   PROCALCITONIN ng/ml 0 09       Lines/Drains:  Invasive Devices     Peripheral Intravenous Line  Duration           Peripheral IV 02/21/23 Left;Proximal;Ventral (anterior) Forearm 2 days                      Imaging: Reviewed radiology reports from this admission including: chest xray    Recent Cultures (last 7 days):         Last 24 Hours Medication List:   Current Facility-Administered Medications   Medication Dose Route Frequency Provider Last Rate   • acetaminophen  975 mg Oral Q8H PRN Sushant Arminda, DO     • albuterol  2 puff Inhalation Q4H PRN Sushant Hilliard, DO     • aspirin  81 mg Oral Daily Karmon Ye, DO     • azithromycin  500 mg Oral Q24H Sushant Hilliard, DO     • budesonide  0 5 mg Nebulization Q12H Radha Oyster, DO     • chlorhexidine  15 mL Mouth/Throat Q12H [facility] Kamron Ye, DO     • vitamin B-12  1,000 mcg Oral Daily Cain Moser MD     • Diclofenac Sodium  2 g Topical 4x Daily Kamron Ye, DO     • enoxaparin  40 mg Subcutaneous Daily Radha Oyster, DO     • ergocalciferol  50,000 Units Oral Q28 Days Radha Oyster, DO     • escitalopram  5 mg Oral Daily Radha Oyster, DO     • ferrous sulfate  325 mg Oral Every Other Day Radha Oyster, DO     • formoterol  20 mcg Nebulization Q12H Radha Oyster, DO     • furosemide  20 mg Oral Daily Radha Oyster, DO     • guaiFENesin  600 mg Oral Q12H [facility] Cain Moser MD     • insulin lispro  1-5 Units Subcutaneous TID AC Francis Nerier Johanna, DO     • insulin lispro  1-5 Units Subcutaneous HS Francis Nerier Johanna, DO     • levalbuterol  1 25 mg Nebulization TID Radha Oyster, DO      And   • ipratropium  0 5 mg Nebulization TID Radha Oyster, DO     • losartan  12 5 mg Oral Daily Jennifer Dang PA-C     • omeprazole (PRILOSEC) suspension 2 mg/mL  20 mg Oral Daily Radha Oyster, DO     • polyethylene glycol  17 g Oral Daily PRN Radha Oyster, DO     • pravastatin  80 mg Oral Daily With Ramakrishna Zuleta DO     • [START ON 2/24/2023] predniSONE  30 mg Oral Daily Sadiq Sales, DO      Followed by   • [START ON 2/27/2023] predniSONE  20 mg Oral Daily Sadiq Sales, DO      Followed by   • [START ON 3/2/2023] predniSONE  10 mg Oral Daily Sadiq Sales, DO     • [START ON 3/5/2023] predniSONE  5 mg Oral Daily Sadiq Sales, DO     • senna-docusate sodium  1 tablet Oral HS Radha Oyster, DO     • sodium chloride  2 spray Each Nare Q1H PRN Francis Canelo Spencer, DO     • tamsulosin  0 4 mg Oral Daily With Ramakrishna Zuleta DO          Today, Patient Was Seen By: Cain Moser MD    **Please Note: This note may have been constructed using a voice recognition system  **

## 2023-02-23 NOTE — PLAN OF CARE
Problem: OCCUPATIONAL THERAPY ADULT  Goal: Performs self-care activities at highest level of function for planned discharge setting  See evaluation for individualized goals  Description: Treatment Interventions: ADL retraining, UE strengthening/ROM, Endurance training, Patient/family training, Equipment evaluation/education, Compensatory technique education, Continued evaluation, Cardiac education, Activityengagement          See flowsheet documentation for full assessment, interventions and recommendations  Note: Limitation: Decreased ADL status, Decreased UE ROM, Decreased UE strength, Decreased cognition, Decreased self-care trans, Decreased high-level ADLs  Prognosis: Fair  Assessment: Pt was seen on 2/23/2023 for skilled OT session  OT session was focused on ADLs, functional mobility, functional transfers, static/dynamic balance, safety awareness, education, increased activity tolerance, and energy conservation  Pt was agreeable to OT session  Pt was able to demonstrate transfers and bed mobility with min Ax1  Pt was able to tolerate static standing for ~ 3 mins  Pt was able to take a couple steps from bed to chair with mod Ax1, HHA  Pt was able to complete grooming tasks with min A  Pt is limited 2* pain, endurance, activity tolerance, functional mobility, functional standing tolerance and decreased I w/ ADLS/IADLS  The following Occupational Performance Areas to address include: eating, grooming, bathing/shower, toilet hygiene, dressing, health maintenance and functional mobility  Pt will continue to benefit from skilled OT services 3-5x a wk to address functional goals  The patient's raw score on the AM-PAC Daily Activity Inpatient Short Form is 17  A raw score of less than 19 suggests the patient may benefit from discharge to post-acute rehabilitation services  Please refer to the recommendation of the Occupational Therapist for safe discharge planning    OT recommendations for d/c include post acute rehab      OT Discharge Recommendation: Post acute rehabilitation services

## 2023-02-24 LAB
ANION GAP SERPL CALCULATED.3IONS-SCNC: 2 MMOL/L (ref 4–13)
BASOPHILS # BLD AUTO: 0 THOUSANDS/ÂΜL (ref 0–0.1)
BASOPHILS NFR BLD AUTO: 0 % (ref 0–1)
BUN SERPL-MCNC: 13 MG/DL (ref 5–25)
CALCIUM SERPL-MCNC: 8.4 MG/DL (ref 8.3–10.1)
CHLORIDE SERPL-SCNC: 93 MMOL/L (ref 96–108)
CO2 SERPL-SCNC: 33 MMOL/L (ref 21–32)
CREAT SERPL-MCNC: 0.32 MG/DL (ref 0.6–1.3)
EOSINOPHIL # BLD AUTO: 0.03 THOUSAND/ÂΜL (ref 0–0.61)
EOSINOPHIL NFR BLD AUTO: 0 % (ref 0–6)
ERYTHROCYTE [DISTWIDTH] IN BLOOD BY AUTOMATED COUNT: 13 % (ref 11.6–15.1)
GFR SERPL CREATININE-BSD FRML MDRD: 136 ML/MIN/1.73SQ M
GLUCOSE SERPL-MCNC: 116 MG/DL (ref 65–140)
GLUCOSE SERPL-MCNC: 195 MG/DL (ref 65–140)
GLUCOSE SERPL-MCNC: 212 MG/DL (ref 65–140)
GLUCOSE SERPL-MCNC: 85 MG/DL (ref 65–140)
GLUCOSE SERPL-MCNC: 90 MG/DL (ref 65–140)
HCT VFR BLD AUTO: 26.2 % (ref 36.5–49.3)
HGB BLD-MCNC: 8.7 G/DL (ref 12–17)
IMM GRANULOCYTES # BLD AUTO: 0.04 THOUSAND/UL (ref 0–0.2)
IMM GRANULOCYTES NFR BLD AUTO: 1 % (ref 0–2)
LYMPHOCYTES # BLD AUTO: 0.59 THOUSANDS/ÂΜL (ref 0.6–4.47)
LYMPHOCYTES NFR BLD AUTO: 7 % (ref 14–44)
MCH RBC QN AUTO: 32.3 PG (ref 26.8–34.3)
MCHC RBC AUTO-ENTMCNC: 33.2 G/DL (ref 31.4–37.4)
MCV RBC AUTO: 97 FL (ref 82–98)
MONOCYTES # BLD AUTO: 1.16 THOUSAND/ÂΜL (ref 0.17–1.22)
MONOCYTES NFR BLD AUTO: 14 % (ref 4–12)
NEUTROPHILS # BLD AUTO: 6.59 THOUSANDS/ÂΜL (ref 1.85–7.62)
NEUTS SEG NFR BLD AUTO: 78 % (ref 43–75)
NRBC BLD AUTO-RTO: 0 /100 WBCS
PLATELET # BLD AUTO: 237 THOUSANDS/UL (ref 149–390)
PMV BLD AUTO: 9.6 FL (ref 8.9–12.7)
POTASSIUM SERPL-SCNC: 3.8 MMOL/L (ref 3.5–5.3)
PROCALCITONIN SERPL-MCNC: 0.05 NG/ML
RBC # BLD AUTO: 2.69 MILLION/UL (ref 3.88–5.62)
SODIUM SERPL-SCNC: 128 MMOL/L (ref 135–147)
WBC # BLD AUTO: 8.41 THOUSAND/UL (ref 4.31–10.16)

## 2023-02-24 PROCEDURE — XW033E5 INTRODUCTION OF REMDESIVIR ANTI-INFECTIVE INTO PERIPHERAL VEIN, PERCUTANEOUS APPROACH, NEW TECHNOLOGY GROUP 5: ICD-10-PCS | Performed by: INTERNAL MEDICINE

## 2023-02-24 RX ORDER — FUROSEMIDE 10 MG/ML
20 INJECTION INTRAMUSCULAR; INTRAVENOUS ONCE
Status: COMPLETED | OUTPATIENT
Start: 2023-02-24 | End: 2023-02-24

## 2023-02-24 RX ORDER — ALBUMIN, HUMAN INJ 5% 5 %
12.5 SOLUTION INTRAVENOUS ONCE
Status: COMPLETED | OUTPATIENT
Start: 2023-02-24 | End: 2023-02-25

## 2023-02-24 RX ADMIN — ASPIRIN 81 MG CHEWABLE TABLET 81 MG: 81 TABLET CHEWABLE at 10:19

## 2023-02-24 RX ADMIN — LEVALBUTEROL HYDROCHLORIDE 1.25 MG: 1.25 SOLUTION, CONCENTRATE RESPIRATORY (INHALATION) at 21:59

## 2023-02-24 RX ADMIN — DICLOFENAC SODIUM 2 G: 10 GEL TOPICAL at 14:58

## 2023-02-24 RX ADMIN — GUAIFENESIN 600 MG: 600 TABLET, EXTENDED RELEASE ORAL at 22:01

## 2023-02-24 RX ADMIN — LOSARTAN POTASSIUM 12.5 MG: 25 TABLET, FILM COATED ORAL at 10:19

## 2023-02-24 RX ADMIN — CHLORHEXIDINE GLUCONATE 0.12% ORAL RINSE 15 ML: 1.2 LIQUID ORAL at 22:01

## 2023-02-24 RX ADMIN — INSULIN LISPRO 1 UNITS: 100 INJECTION, SOLUTION INTRAVENOUS; SUBCUTANEOUS at 22:02

## 2023-02-24 RX ADMIN — ENOXAPARIN SODIUM 40 MG: 40 INJECTION SUBCUTANEOUS at 10:20

## 2023-02-24 RX ADMIN — PREDNISONE 30 MG: 20 TABLET ORAL at 10:20

## 2023-02-24 RX ADMIN — CHLORHEXIDINE GLUCONATE 0.12% ORAL RINSE 15 ML: 1.2 LIQUID ORAL at 10:20

## 2023-02-24 RX ADMIN — DICLOFENAC SODIUM 2 G: 10 GEL TOPICAL at 10:41

## 2023-02-24 RX ADMIN — IPRATROPIUM BROMIDE 0.5 MG: 0.5 SOLUTION RESPIRATORY (INHALATION) at 08:59

## 2023-02-24 RX ADMIN — LEVALBUTEROL HYDROCHLORIDE 1.25 MG: 1.25 SOLUTION, CONCENTRATE RESPIRATORY (INHALATION) at 14:37

## 2023-02-24 RX ADMIN — ESCITALOPRAM OXALATE 5 MG: 5 TABLET, FILM COATED ORAL at 10:19

## 2023-02-24 RX ADMIN — LEVALBUTEROL HYDROCHLORIDE 1.25 MG: 1.25 SOLUTION, CONCENTRATE RESPIRATORY (INHALATION) at 08:59

## 2023-02-24 RX ADMIN — BUDESONIDE 0.5 MG: 0.5 INHALANT ORAL at 21:59

## 2023-02-24 RX ADMIN — TAMSULOSIN HYDROCHLORIDE 0.4 MG: 0.4 CAPSULE ORAL at 17:52

## 2023-02-24 RX ADMIN — IPRATROPIUM BROMIDE 0.5 MG: 0.5 SOLUTION RESPIRATORY (INHALATION) at 21:59

## 2023-02-24 RX ADMIN — GUAIFENESIN 600 MG: 600 TABLET, EXTENDED RELEASE ORAL at 10:20

## 2023-02-24 RX ADMIN — SENNOSIDES AND DOCUSATE SODIUM 1 TABLET: 8.6; 5 TABLET ORAL at 22:01

## 2023-02-24 RX ADMIN — REMDESIVIR 200 MG: 100 INJECTION, POWDER, LYOPHILIZED, FOR SOLUTION INTRAVENOUS at 22:05

## 2023-02-24 RX ADMIN — IPRATROPIUM BROMIDE 0.5 MG: 0.5 SOLUTION RESPIRATORY (INHALATION) at 14:37

## 2023-02-24 RX ADMIN — AZITHROMYCIN MONOHYDRATE 500 MG: 250 TABLET ORAL at 14:58

## 2023-02-24 RX ADMIN — DICLOFENAC SODIUM 2 G: 10 GEL TOPICAL at 22:02

## 2023-02-24 RX ADMIN — PRAVASTATIN SODIUM 80 MG: 20 TABLET ORAL at 17:52

## 2023-02-24 RX ADMIN — DICLOFENAC SODIUM 2 G: 10 GEL TOPICAL at 17:53

## 2023-02-24 RX ADMIN — FORMOTEROL FUMARATE DIHYDRATE 20 MCG: 20 SOLUTION RESPIRATORY (INHALATION) at 21:59

## 2023-02-24 RX ADMIN — Medication 20 MG: at 10:20

## 2023-02-24 RX ADMIN — BUDESONIDE 0.5 MG: 0.5 INHALANT ORAL at 08:59

## 2023-02-24 RX ADMIN — ALBUMIN (HUMAN) 12.5 G: 12.5 INJECTION, SOLUTION INTRAVENOUS at 19:30

## 2023-02-24 RX ADMIN — FUROSEMIDE 20 MG: 20 TABLET ORAL at 10:20

## 2023-02-24 RX ADMIN — CYANOCOBALAMIN TAB 500 MCG 1000 MCG: 500 TAB at 10:19

## 2023-02-24 RX ADMIN — FUROSEMIDE 20 MG: 10 INJECTION, SOLUTION INTRAMUSCULAR; INTRAVENOUS at 14:59

## 2023-02-24 NOTE — PROGRESS NOTES
[facility]  Progress Note - Anastacia Guardado  1955, 74 y o  male MRN: [id number]  Unit/Bed#: -01 Encounter: [id number]  Primary Care Provider: Jasson Pate DO   Date and time admitted to hospital: 2/12/2023  5:07 AM    * Chronic obstructive pulmonary disease ([facility] [address] )  Assessment & Plan  · Follows with Pulm as an outpatient  · Last FEV1 22%, consistent with very-severe COPD  · Home regimen:  · 3L O2 at home  · All nebs- Duo-Neb/Pulmicort/Perforomist  · Albuterol rescue inhaler  · Prednisone 5 daily  · Multiple prior exacerbations requiring admission  · Intubated 2/12, extubated 2/15  · Currently requiring 4 L supplemental O2  · Wean O2 as able  · Maintain O2 sats between 88-92%  · Continue bronchodilators - scheduled Xopenx/Atrovent TID, perforomist/pulmicort BID  · Status post Solu-medrol 40 mg TID; switched to PO taper  · Aggressive pulmonary hygiene- use of incentive spirometry, VEST/airway clearance, OOB to chair as able  · Palliative consulted, appreciate recommendations  · Goals of care discussed prior with patient with wife at bedside, patient to remain level 3 DNR/DNI       Acute on chronic respiratory failure with hypercapnia ([facility] )  Assessment & Plan  · Chronically on 3 L at home  · Currently on 4 L supplemental O2  · Continue with treatment plan as above      COVID-19 virus infection  Assessment & Plan  · Patient tested positive for COVID on 2/22/2023  Patient on 4L nasal cannula  · Monitor WBC and fever curve  · Continue oxygen segmentation to maintain SPO2 >88%  · Pulm re-evaluated, I discussed with them today - they recommended to continue current steroid taper, consider antiviral (remdesivir ordered x 3 days per protocol given patient's risk factors: age, chronic heart and lung disease), no need for escalated anti-inflammatory per Pulm  · CXR revealed new bibasilar opacity, L>R  · Procal negative  Abx discontinued per Pulm recs       Acute on chronic HFrEF (heart failure with reduced ejection fraction) ([facility])  Assessment & Plan  · NYHA class: III  / ACC/AHA stage: C; in setting of very severe COPD (FEV1 22%)  · Follows with Dr Yasmeen Mckay as an outpatient  · EF 40% PTA on lasix 20mg qd at home  · TTE on admission showing EF 20-25% with abnormal septal motion in setting of chronic LBBB, RVSP 38, mildly dilated RV  · Clinical presentation on arrival consistent with biventricular heart failure in setting of AECOPD  · Repeated TTE on 2/21 to reassess EF showed EF 30%  · Continue PO Lasix 20mg daily  · Goal-directed medical therapy  · Sudden cardiac death risk reduction:  · Not candidate for ICD/Life Vest candidacy due to DNR/DNI  · Of note, patient has known hx of LBBB with QRS around 120ms  · Cardiac diet, sodium restriction <2g, fluid restriction <1 5L  · Daily I&Os  · MAP goal >65   · Consulted heart failure service given newly decreased ejection fraction, appreciate recs  -Discontinued metoprolol due to wheezing  -Continue losartan 12 5 mg  -Plan to start MRA in the future  -Outpatient cardiology/heart failure follow-up    KEVIN and COPD overlap syndrome (HCC)  Assessment & Plan  · Uses BiPAP at home  · Continue BiPAP 12/6/40% qHs    Nonobstructive atherosclerosis of coronary artery  Assessment & Plan  · Coronary calcification seen on prior CT   · Follows with Dr Yasmeen Mckay as an outpatient  · Continue with ASCVD prophylaxis with ASA and statin      Physical deconditioning  Assessment & Plan  · Evaluated by PT/OT with recommendation for postacute rehab  · Case management following, plan for [place]     Anemia  Assessment & Plan  · Hgb low but stable in 7's  No overt signs of GI bleed  · Iron panel suggesting mixed iron deficiency with likely anemia of chronic disease contribution  · Retic index   74 suggesting hypoproliferation  · Elevated MCV  · Continue ferrous sulfate supplementation   · Trend CBC, transfuse if Hgb drops below 7  · Given patient has signs of numbness/tingling on the tip of the fingers, was started on vitamin B12 supplementation  · On DVT ppx     Gastroesophageal reflux disease  Assessment & Plan  · Continue PPI        VTE Pharmacologic Prophylaxis: VTE Score: 3 Moderate Risk (Score 3-4) - Pharmacological DVT Prophylaxis Ordered: enoxaparin (Lovenox)  Patient Centered Rounds: I performed bedside rounds with nursing staff today  Hans Ba  Discussions with Specialists or Other Care Team Provider: , Pulm Fellow, Heart Failure AP, SLIM attending Dr Tc Sampson     Education and Discussions with Family / Patient: Updated  (wife) at bedside  Total Time Spent on Date of Encounter in care of patient: 45 minutes This time was spent on one or more of the following: performing physical exam; counseling and coordination of care; obtaining or reviewing history; documenting in the medical record; reviewing/ordering tests, medications or procedures; communicating with other healthcare professionals and discussing with patient's family/caregivers  Current Length of Stay: 12 day(s)  Current Patient Status: Inpatient   Certification Statement: The patient will continue to require additional inpatient hospital stay due to COVID+, tachypnaea, Pulm re-eval  Discharge Plan: Anticipate discharge in 24-48 hrs to rehab facility  Code Status: Level 3 - DNAR and DNI    Subjective:   Mr Christiano Dejesus reports feeling more SOB  He reports feeling fatigued    Objective:     Vitals:   Temp (24hrs), Av 7 °F (36 5 °C), Min:97 4 °F (36 3 °C), Max:98 °F (36 7 °C)    Temp:  [97 4 °F (36 3 °C)-98 °F (36 7 °C)] 98 °F (36 7 °C)  HR:  [71-83] 83  Resp:  [17] 17  BP: ()/(52-54) 96/52  SpO2:  [90 %-98 %] 97 %  Body mass index is 20 54 kg/m²  Input and Output Summary (last 24 hours):      Intake/Output Summary (Last 24 hours) at 2023 1635  Last data filed at 2023 1001  Gross per 24 hour   Intake --   Output 925 ml   Net -925 ml       Physical Exam:   Physical Exam  Vitals reviewed  Constitutional:       Appearance: He is ill-appearing  Comments: Patient seen sitting in bed, wife at bedside   Cardiovascular:      Rate and Rhythm: Normal rate and regular rhythm  Comments: Distant hear sounds  Pulmonary:      Breath sounds: Wheezing present  Comments: Tachpneic, coarse breath sounds  4L NC O2  Abdominal:      General: Bowel sounds are normal       Palpations: Abdomen is soft  Tenderness: There is no abdominal tenderness  Musculoskeletal:      Right lower leg: No edema  Left lower leg: No edema  Skin:     General: Skin is warm  Neurological:      Mental Status: He is alert and oriented to person, place, and time     Psychiatric:         Mood and Affect: Mood normal          Behavior: Behavior normal           Additional Data:     Labs:  Results from last 7 days   Lab Units 02/24/23  0904   WBC Thousand/uL 8 41   HEMOGLOBIN g/dL 8 7*   HEMATOCRIT % 26 2*   PLATELETS Thousands/uL 237   NEUTROS PCT % 78*   LYMPHS PCT % 7*   MONOS PCT % 14*   EOS PCT % 0     Results from last 7 days   Lab Units 02/24/23  0600   SODIUM mmol/L 128*   POTASSIUM mmol/L 3 8   CHLORIDE mmol/L 93*   CO2 mmol/L 33*   BUN mg/dL 13   CREATININE mg/dL 0 32*   ANION GAP mmol/L 2*   CALCIUM mg/dL 8 4   GLUCOSE RANDOM mg/dL 85         Results from last 7 days   Lab Units 02/24/23  1240 02/24/23  0556 02/23/23 2010 02/23/23  1807 02/23/23  1131 02/23/23  0647 02/22/23  2216 02/22/23  1603 02/22/23  1157 02/22/23  0541 02/21/23  2040 02/21/23  1600   POC GLUCOSE mg/dl 116 90 211* 241* 108 91 135 122 163* 102 133 202*         Results from last 7 days   Lab Units 02/24/23  0729   PROCALCITONIN ng/ml 0 05       Lines/Drains:  Invasive Devices     Peripheral Intravenous Line  Duration           Peripheral IV 02/21/23 Left;Proximal;Ventral (anterior) Forearm 3 days                      Imaging: Reviewed radiology reports from this admission including: chest xray    Recent Cultures (last 7 days):         Last 24 Hours Medication List:   Current Facility-Administered Medications   Medication Dose Route Frequency Provider Last Rate   • acetaminophen  975 mg Oral Q8H PRN Sushant Banai, DO     • albuterol  2 puff Inhalation Q4H PRN Sushant Banai, DO     • aspirin  81 mg Oral Daily Kamron P Ye, DO     • budesonide  0 5 mg Nebulization Q12H Tolu Levels, DO     • chlorhexidine  15 mL Mouth/Throat Q12H [address] Ye, DO     • vitamin B-12  1,000 mcg Oral Daily Sarah Moser MD     • Diclofenac Sodium  2 g Topical 4x Daily Tolu Levels, DO     • enoxaparin  40 mg Subcutaneous Daily Tolu Levels, DO     • ergocalciferol  50,000 Units Oral Q28 Days Tolu Levels, DO     • escitalopram  5 mg Oral Daily Tolu Levels, DO     • ferrous sulfate  325 mg Oral Every Other Day Tolu Levels, DO     • formoterol  20 mcg Nebulization Q12H Tolu Levels, DO     • furosemide  20 mg Oral Daily Tolu Levels, DO     • guaiFENesin  600 mg Oral Q12H [address] Sarah Moser MD     • insulin lispro  1-5 Units Subcutaneous TID AC Trybritt Spencer, DO     • insulin lispro  1-5 Units Subcutaneous HS Trybritt Spencer, DO     • levalbuterol  1 25 mg Nebulization TID Tolu Levels, DO      And   • ipratropium  0 5 mg Nebulization TID Tolu Levels, DO     • losartan  12 5 mg Oral Daily Yelitza López PA-C     • omeprazole (PRILOSEC) suspension 2 mg/mL  20 mg Oral Daily Kamron P Ye, DO     • polyethylene glycol  17 g Oral Daily PRN Tolu Levels, DO     • pravastatin  80 mg Oral Daily With [address] [facility] [address] Ye, DO     • predniSONE  30 mg Oral Daily Eliezer Juarez, DO      Followed by   • [START ON 2/27/2023] predniSONE  20 mg Oral Daily Eliezer Juarez DO      Followed by   • [START ON 3/2/2023] predniSONE  10 mg Oral Daily Eliezer Juarez DO     • [START ON 3/5/2023] predniSONE  5 mg Oral Daily Eliezer Juarez DO • remdesivir  200 mg Intravenous Q24H Darrell Wilde PA-C      Followed by   • [START ON 2/25/2023] remdesivir  100 mg Intravenous Q24H Darrell Wilde PA-C     • senna-docusate sodium  1 tablet Oral HS Frieda Polanco, DO     • sodium chloride  2 spray Each Nare Q1H PRN Andrew Spencer, DO     • tamsulosin  0 4 mg Oral Daily With [address], DO          Today, Patient Was Seen By: Darrell Wilde PA-C    **Please Note: This note may have been constructed using a voice recognition system  **

## 2023-02-24 NOTE — CASE MANAGEMENT
Case Management Discharge Planning Note    Patient name Darian Scruggs    Location /-01 MRN [id number]  : 1955 Date 2023       Current Admission Date: 2023  Current Admission Diagnosis:Chronic obstructive pulmonary disease [facility])   Patient Active Problem List    Diagnosis Date Noted   • COVID-19 virus infection 2023   • Physical deconditioning 2023   • Anemia 2023   • Acute on chronic HFrEF (heart failure with reduced ejection fraction) [facility] ) 2022   • Protein-calorie malnutrition ([facility] ) 2022   • Pulmonary nodules/lesions, multiple 2022   • Prostate cancer ([facility] ) 2021   • BPH with obstruction/lower urinary tract symptoms 2021   • Acute on chronic respiratory failure with hypercapnia ([facility] ) 2020   • Macrocytosis without anemia 2019   • Other insomnia 2019   • Gastroesophageal reflux disease 2017   • Nonobstructive atherosclerosis of coronary artery 2016   • Mood disorder ([facility] ) 2015   • Impaired fasting glucose 2015   • Osteoporosis 10/14/2014   • Vitamin D deficiency 10/14/2014   • Nonischemic cardiomyopathy ([facility] ) 2014   • Left bundle-branch block 2013   • Osteoarthritis 2013   • KEVIN and COPD overlap syndrome ([facility] ) 2013   • Chronic obstructive pulmonary disease ([facility] ) 2012      LOS (days): 12  Geometric Mean LOS (GMLOS) (days): 5 00  Days to GMLOS:-7 2     OBJECTIVE:  Risk of Unplanned Readmission Score: 25 5         Current admission status: Inpatient   Preferred Pharmacy:   [facility]/pharm[facility] #[id number]  [facility] [address]  Phone: [phone] Fax: [phone]    Primary Care Provider: Crissy Ness DO    Primary Insurance: BLUE CROSS  Secondary Insurance: MEDICARE    DISCHARGE DETAILS:            Other Referral/Resources/Interventions Provided:  Interventions: Acute Rehab  Referral Comments: Received TT from Katia Carlos S  regarding dc to [facility] today  Reached out to MARLEY Neely  at [facility] and beds/admissions today are limited and she needs to know as soon as possible if pt is to be dc today so she can accomodate  TT [facility] provider [facility] and made aware

## 2023-02-24 NOTE — PROGRESS NOTES
PULMONOLOGY PROGRESS NOTE     Name: Ava Marie  Age & Sex: 74 y o  male   MRN: [id number]  Unit/Bed#: -01   Encounter: [id number]    PATIENT INFORMATION     Name: Ava Marie  Age & Sex: 74 y o  male   MRN: [id number]  Hospital Stay Days: 12    ASSESSMENT/PLAN     Assessment:   1  Acute on chronic respiratory failure with hypoxemic and hypercapnia; resolving  2  CAP; resolving  3  Acute exacerbation of COPD; resolving  4  Very severe COPD; end-stage  5  Acute decompensated heart failure with reduced ejection fraction; resolving  6  Pulmonary hypertension likely group 2 and group 3 disease  7  KEVIN and COPD overlap syndrome  8  Pulmonary nodules  9  Former smoker    Plan:  • COPD/CHF exacerbation with CAP requiring ICU admission and mechanical ventilation  Now complicated by COVID  • Patient's oxygen level is unchanged from home baseline  While the COVID is present it is likely not making significant impact in his overall health  Would follow mild COVID treatment pathway per [facility] protocol  No further indication of escalation of steroids  Could consider initiation of antiviral per protocol  No need for escalated anti-inflammatory  Can discontinue antibiotics  • Would not switch to inhaler regimen  He was on an all nebulized regimen at home per outpatient pulmonologist due to difficulty with inhalers previously  Would not attempt to switch to inhalers during this acute COVID infection  • Completed course of antibiotics  • Transitioned to oral prednisone taper, continue  Resume home 5 mg prednisone daily and end of taper  • Continue bronchodilators: formoterol, budesonide, levalbuterol, and ipratropium  Discharge back to home regimen: formoterol, budesonide, albuterol, and ipratropium  • Net ~ 10 L for admission  Further diuresis per primary team and cardio  GDMT for heart failure per primary team    • Continue with BPAP at night  Reportedly has trilogy at home  Will need to ensure this   Will need to ensure has some NIV at home  • Continue to supplement O2 to maintain an SpO2 of >88%  • All other care per primary team      SUBJECTIVE     Patient seen and examined  No acute events overnight  States he currently feels short of breath  He states has been ongoing for several months    OBJECTIVE     Vitals:    23 0559 23 0700 23 0902 23 1036   BP:  100/54  96/52   BP Location:       Pulse:  71  83   Resp:  17     Temp:  98 °F (36 7 °C)     TempSrc:       SpO2:   90% 97%   Weight: 47 7 kg (105 lb 2 6 oz)      Height:          Temperature:   Temp (24hrs), Av 6 °F (36 4 °C), Min:97 2 °F (36 2 °C), Max:98 °F (36 7 °C)    Temperature: 98 °F (36 7 °C)  Intake & Output:  I/O        0701   0700  0701   0700  0701   0700    Urine (mL/kg/hr) 1025 (0 8) 1375 (1 1)     Total Output 1025 1375     Net -1025 -1375            Unmeasured Urine Occurrence 1 x          Weights:   IBW (Ideal Body Weight): 50 kg    Body mass index is 20 54 kg/m²  Weight (last 2 days)     Date/Time Weight    23 0559 47 7 (105 16)    23 1100 49 6 (109 35)    23 0600 51 1 (112 66)        Physical Exam  Vitals and nursing note reviewed  Constitutional:       General: He is not in acute distress  Appearance: Normal appearance  He is well-developed and normal weight  He is ill-appearing  He is not toxic-appearing  Interventions: He is not intubated  HENT:      Head: Normocephalic and atraumatic  Right Ear: External ear normal       Left Ear: External ear normal       Nose: Nose normal       Mouth/Throat:      Mouth: Mucous membranes are moist       Pharynx: Oropharynx is clear  Eyes:      General: No scleral icterus  Conjunctiva/sclera: Conjunctivae normal    Cardiovascular:      Rate and Rhythm: Normal rate and regular rhythm  Pulses: Normal pulses  Heart sounds: Normal heart sounds  No murmur heard  No friction rub  No gallop  Pulmonary:      Effort: Tachypnea and respiratory distress present  No accessory muscle usage  He is not intubated  Breath sounds: Normal breath sounds and air entry  No decreased air movement  No decreased breath sounds, wheezing, rhonchi or rales  Abdominal:      General: Abdomen is flat  Bowel sounds are normal       Palpations: Abdomen is soft  Musculoskeletal:         General: No swelling or tenderness  Cervical back: Neck supple  No tenderness  Skin:     General: Skin is warm and dry  Neurological:      General: No focal deficit present  Mental Status: He is alert and oriented to person, place, and time  Mental status is at baseline  LABORATORY DATA     Labs: I have personally reviewed pertinent reports  Results from last 7 days   Lab Units 02/24/23  0904 02/23/23  1154 02/20/23  0442   WBC Thousand/uL 8 41 10 13 8 60   HEMOGLOBIN g/dL 8 7* 9 9* 8 1*   HEMATOCRIT % 26 2* 31 7* 25 9*   PLATELETS Thousands/uL 237 281 233   NEUTROS PCT % 78* 79*  --    MONOS PCT % 14* 14*  --       Results from last 7 days   Lab Units 02/24/23  0600 02/23/23  1154 02/22/23  0451   POTASSIUM mmol/L 3 8 4 0 3 3*   CHLORIDE mmol/L 93* 90* 89*   CO2 mmol/L 33* 33* 37*   BUN mg/dL 13 14 26*   CREATININE mg/dL 0 32* 0 44* 0 37*   CALCIUM mg/dL 8 4 8 7 8 5                              ABG:         Micro:           IMAGING & DIAGNOSTIC TESTING     Radiology Results: I have personally reviewed pertinent reports  XR chest portable    Result Date: 2/13/2023  Impression: 1  Persistent high positioning of the endotracheal tube  Advancement by at least 4 cm advised  2   Tip of right internal jugular central venous catheter projects over the lower SVC  No pneumothorax  3   Persistent pulmonary vascular congestion  The study was marked in [facility] for immediate notification   Workstation performed: [id number]     XR chest 1 view portable    Result Date: 2/12/2023  Impression: Moderate pulmonary venous congestion  ET tube 6 cm above the ghada  Workstation performed: [id number]     CT head wo contrast    Result Date: 2/12/2023  Impression: No acute intracranial abnormality  Workstation performed: [id number]     XR chest portable ICU    Result Date: 2/15/2023  Impression: Endotracheal tube projects 2-3 cm above the ghada  Stable moderate pulmonary vascular congestion  Workstation performed: [id number]     XR chest portable ICU    Result Date: 2/13/2023  Impression: Tubes and lines as above without pneumothorax  Recommend advancement of nasogastric tube  No acute cardiopulmonary disease  The study was marked in [facility] for immediate notification  Workstation performed: [id number]     Other Diagnostic Testing: I have personally reviewed pertinent reports      ACTIVE MEDICATIONS     Current Facility-Administered Medications   Medication Dose Route Frequency   • acetaminophen (TYLENOL) tablet 975 mg  975 mg Oral Q8H PRN   • albuterol (PROVENTIL HFA,VENTOLIN HFA) inhaler 2 puff  2 puff Inhalation Q4H PRN   • aspirin chewable tablet 81 mg  81 mg Oral Daily   • azithromycin (ZITHROMAX) tablet 500 mg  500 mg Oral Q24H   • budesonide (PULMICORT) inhalation solution 0 5 mg  0 5 mg Nebulization Q12H   • chlorhexidine (PERIDEX) 0 12 % oral rinse 15 mL  15 mL Mouth/Throat Q12H GABE   • cyanocobalamin (VITAMIN B-12) tablet 1,000 mcg  1,000 mcg Oral Daily   • Diclofenac Sodium (VOLTAREN) 1 % topical gel 2 g  2 g Topical 4x Daily   • enoxaparin (LOVENOX) subcutaneous injection 40 mg  40 mg Subcutaneous Daily   • ergocalciferol (VITAMIN D2) capsule 50,000 Units  50,000 Units Oral Q28 Days   • escitalopram (LEXAPRO) tablet 5 mg  5 mg Oral Daily   • ferrous sulfate tablet 325 mg  325 mg Oral Every Other Day   • formoterol (PERFOROMIST) nebulizer solution 20 mcg  20 mcg Nebulization Q12H   • furosemide (LASIX) tablet 20 mg  20 mg Oral Daily   • guaiFENesin (MUCINEX) 12 hr tablet 600 mg  600 mg Oral Q12H GABE   • insulin lispro (HumaLOG) 100 units/mL subcutaneous injection 1-5 Units  1-5 Units Subcutaneous TID AC   • insulin lispro (HumaLOG) 100 units/mL subcutaneous injection 1-5 Units  1-5 Units Subcutaneous HS   • levalbuterol (XOPENEX) inhalation solution 1 25 mg  1 25 mg Nebulization TID    And   • ipratropium (ATROVENT) 0 02 % inhalation solution 0 5 mg  0 5 mg Nebulization TID   • losartan (COZAAR) tablet 12 5 mg  12 5 mg Oral Daily   • omeprazole (PRILOSEC) suspension 2 mg/mL  20 mg Oral Daily   • polyethylene glycol (MIRALAX) packet 17 g  17 g Oral Daily PRN   • pravastatin (PRAVACHOL) tablet 80 mg  80 mg Oral Daily With Dinner   • predniSONE tablet 30 mg  30 mg Oral Daily    Followed by   • [START ON 2/27/2023] predniSONE tablet 20 mg  20 mg Oral Daily    Followed by   • [START ON 3/2/2023] predniSONE tablet 10 mg  10 mg Oral Daily   • [START ON 3/5/2023] predniSONE tablet 5 mg  5 mg Oral Daily   • senna-docusate sodium (SENOKOT S) 8 6-50 mg per tablet 1 tablet  1 tablet Oral HS   • sodium chloride (OCEAN) 0 65 % nasal spray 2 spray  2 spray Each Nare Q1H PRN   • tamsulosin (FLOMAX) capsule 0 4 mg  0 4 mg Oral Daily With Dinner         Disclaimer: Portions of the record may have been created with voice recognition software  Occasional wrong word or "sound a like" substitutions may have occurred due to the inherent limitations of voice recognition software  Careful consideration should be taken to recognize, using context, where substitutions have occurred      Kathryn Lowe DO   Pulmonary and Critical Care Fellow, PGY-V  [facility]

## 2023-02-24 NOTE — CASE MANAGEMENT
Case Management Discharge Planning Note    Patient name Ava Marie    Location /-01 MRN [id number]  : 1955 Date 2023       Current Admission Date: 2023  Current Admission Diagnosis:Chronic obstructive pulmonary disease [facility])   Patient Active Problem List    Diagnosis Date Noted   • COVID-19 virus infection 2023   • Physical deconditioning 2023   • Anemia 2023   • Acute on chronic HFrEF (heart failure with reduced ejection fraction) [facility] ) 2022   • [facility]calorie malnutrition ([facility] 2022   • Pulmonary nodules/lesions, multiple 2022   • Prostate cancer ([facility] ) 2021   • BPH with obstruction/lower urinary tract symptoms 2021   • Acute on chronic respiratory failure with hypercapnia ([facility] ) 2020   • Macrocytosis without anemia 2019   • Other insomnia 2019   • Gastroesophageal reflux disease 2017   • Nonobstructive atherosclerosis of coronary artery 2016   • Mood disorder ([facility] ) 2015   • Impaired fasting glucose 2015   • Osteoporosis 10/14/2014   • Vitamin D deficiency 10/14/2014   • Nonischemic cardiomyopathy ([facility] ) 2014   • Left bundle-branch block 2013   • Osteoarthritis 2013   • KEVIN and COPD overlap syndrome ([facility] ) 2013   • Chronic obstructive pulmonary disease ([facility] ) 2012      LOS (days): 12  Geometric Mean LOS (GMLOS) (days): 5 00  Days to GMLOS:-7 5     OBJECTIVE:  Risk of Unplanned Readmission Score: 25 61         Current admission status: Inpatient   Preferred Pharmacy:   [id number]  [facility] [place] [address]  Phone: [phone] Fax: [phone]    Primary Care Provider: Marcelina Salazar DO    Primary Insurance: BLUE CROSS  Secondary Insurance: MEDICARE    DISCHARGE DETAILS:      Other Referral/Resources/Interventions Provided:  Interventions: Acute Rehab  Referral Comments: if pt is to be dc over weekend, CM covering is to contact Ana Manning with [facility] on her cell # [phone]  CM handoff updated with this information as well for weekend CM

## 2023-02-24 NOTE — CASE MANAGEMENT
Case Management Discharge Planning Note    Patient name Smooth Fernandez    Location /-01 MRN [id number]  : 1955 Date 2023       Current Admission Date: 2023  Current Admission Diagnosis:Chronic obstructive pulmonary disease [facility])   Patient Active Problem List    Diagnosis Date Noted   • COVID-19 virus infection 2023   • Physical deconditioning 2023   • Anemia 2023   • Acute on chronic HFrEF (heart failure with reduced ejection fraction) ([facility] ) 2022   • Protein-calorie malnutrition ([facility] ) 2022   • Pulmonary nodules/lesions, multiple 2022   • Prostate cancer ([facility] ) 2021   • BPH with obstruction/lower urinary tract symptoms 2021   • Acute on chronic respiratory failure with hypercapnia ([facility] ) 2020   • Macrocytosis without anemia 2019   • Other insomnia 2019   • Gastroesophageal reflux disease 2017   • Nonobstructive atherosclerosis of coronary artery 2016   • Mood disorder ([facility] ) 2015   • Impaired fasting glucose 2015   • Osteoporosis 10/14/2014   • Vitamin D deficiency 10/14/2014   • Nonischemic cardiomyopathy ([facility] ) 2014   • Left bundle-branch block 2013   • Osteoarthritis 2013   • KEVIN and COPD overlap syndrome ([facility] ) 2013   • Chronic obstructive pulmonary disease ([facility] ) 2012      LOS (days): 12  Geometric Mean LOS (GMLOS) (days): 5 00  Days to GMLOS:-7 2     OBJECTIVE:  Risk of Unplanned Readmission Score: 25 5         Current admission status: Inpatient   Preferred Pharmacy:   [id number]  [facility] [place] [address]  Phone: [phone] Fax: [phone]    Primary Care Provider: Susanne Villaseñor DO    Primary Insurance: BLUE CROSS  Secondary Insurance: MEDICARE    DISCHARGE DETAILS:    Other Referral/Resources/Interventions Provided:  Interventions: Acute Rehab  Referral Comments: per [facility] provider [facility], pt is not medically cleared for dc today  Notified Yeimy Sandoval at [facility] via [employer] and called admissions office

## 2023-02-24 NOTE — PROGRESS NOTES
Advanced Heart Failure / Pulmonary Hypertension Service Progress Note    Kiya Lyon  74 y o  male   MRN: [id number]  Unit/Bed#: -Parish; Encounter: [id number]    Assessment:  Principal Problem:    Chronic obstructive pulmonary disease ([facility] [address] )  Active Problems:    Nonobstructive atherosclerosis of coronary artery    KEVIN and COPD overlap syndrome (HCC)    Gastroesophageal reflux disease    Acute on chronic respiratory failure with hypercapnia ([facility] [address] )    Protein-calorie malnutrition ([facility] )    Acute on chronic HFrEF (heart failure with reduced ejection fraction) ([facility] )    Anemia    Physical deconditioning    COVID-19 virus infection    Kiya Lyon  is a 79-year-old male who presented to [facility] on 2/12/2023 with an acute COPD exacerbation requiring intubation  Extubated 2/15/23  PMH includes nonischemic cardiomyopathy, chronic systolic and diastolic heart failure, nonobstructive CAD, hyperlipidemia, severe COPD, KEVIN  Was previously admitted in 8/2022 for acute decompensated heart failure and COPD exacerbation  LVEF 40% at that time, down from prior of 45 to 50%, though previously EF had been 35% for years  Started on Lasix at that time  Has been euvolemic, though blood pressures have made it difficult to start/titrate up on GDMT  Recently saw Dr Barby Mariano in the office in December, at which time his Lasix was shifted to as needed and adequate oral intake and hydration were encouraged      He was reportedly short of breath for 2 days prior to presentation, with hallucinations  His wife called 46 and EMS intubated him in the field  He was found to have an elevated procalcitonin on admission and started on azithromycin and ceftriaxone  He was given IV Lasix with good urine output  He was weaned off the ventilator and extubated on 2/15  Maintained on BiPAP overnight and has been receiving scheduled nebulizers with aggressive pulmonary hygiene   TTE on admission with LVEF 20 to 25%, down from 40% in 8/2022       He is also followed by pulmonology, who recommended consideration for home hospice for severe COPD in January  He was seen by palliative care while inpatient here, and patient and family elected to make code status level 3 DNR/DNI  Pulmonology has been following him while inpatient, and has been managing steroids, bronchodilators, and nebulizer therapy  Subjective:   Patient seen and examined  No significant events overnight  Recently tested positive for COVID on 2/22  Reporting significant SOB and fatigue, worse with moving around  Denies leg swelling or abdominal distension  Objective: Intake/ Output: 300/1125 (-825)  Weight: 105 lb (bed scale)  Telemetry: not on tele  Today's Plan:  • Appreciate palliative care input  Recommend ongoing goals of care discussions  • Not a LifeVest candidate at this time, particularly given DNR/DNI status  • Positive for COVID-19 on 2/22  CXR yesterday with likely pneumonia and small effusions  • Dyspnea more likely secondary to chronic severe lung disease and concomitant COVID infection  No evidence of volume overload on exam   • Continue losartan 12 5 mg    • Euvolemic on current diuretic dose  • Keep K>4, Mg>2   • Daily weights, strict I/Os, daily BMP      Plan:  Acute on Chronic HFrEF; LVEF 20-25%; NYHA III; ACC/AHA Stage C              Etiology: Nonischemic cardiomyopathy  Possible dyssynchrony from chronic LBBB  Despite QRS only being 120 ms, echo shows significant dyssynchrony  Recent drop in EF likely related to stress myopathy with acute exacerbation of COPD requiring intubation      TTE 2/21/23: LVEF 30%  LVIDd 6 6cm  severe global hypokinesis  Grade I DD  RV cavity size and systolic function normal  Mild TR  TTE 2/12/2023: LVEF 20-25%  Severe global hypokinesis with regional variation  Grade 1 DD  Abnormal septal motion consistent with LBBB  RV cavity mildly dilated, normal systolic function  Mild MR, mild TR   RVSP 38  Dilated IVC  TTE 8/26/2022: LVEF 40%  RV cavity mildly dilated  Systolic function normal   Mild MR, TR  Normal IVC      Neurohormonal Blockade:  --Beta Blocker: no  --ARNi / ACEi / ARB: losartan 12 5 mg QD  --Aldosterone Antagonist: future maybe   --SGLT2 Inhibitor: no  --Home Diuretic: Lasix PRN  --Inpatient Diuretic: Lasix 20 mg QD     Sudden Cardiac Death Risk Reduction:  --ICD: LVEF newly reduced to 20-25%     Electrical Resynchronization:  --Candidacy for BiV device: QRSd 120ms, chronic LBBB with dyssynchrony on echocardiogram      Advanced Therapies (if appropriate): Not appropriate at this time, particularly considering advanced lung disease        COVID-19  Tested positive on 2/22/23  CXR 2/23/23:  CHEST     INDICATION:   New cough and increased wheezing  Patient has confirmed COVID-19   Positive 2/22/2023  COMPARISON:  CXR 2/15/2023 and chest CT 12/21/2022  EXAM PERFORMED/VIEWS:  LT CHEST PORTABLE       FINDINGS:     Cardiomediastinal silhouette appears unremarkable  Decreased vascularity due to emphysema   Moderate bibasilar opacity, greater on the left, with loss of the left diaphragm   Small effusions   No pneumothorax  Osseous structures appear within normal limits for patient age  Impression:       New bibasilar opacity, greater on the left, with loss of the diaphragm, likely pneumonia, with small effusions  Emphysema  Nonobstructive CAD  Has coronary calcium on CT  On aspirin and statin  Hyperlipidemia  Continue statin  LDL 75 8/2021  COPD (end stage)  Former smoker; 105 pack years, quit in 2012  Severe disease, follows closely with pulm  On home oxygen, 3-4L  Hospitalized with acute COPD exacerbation requiring intubation, now extubated  Management per pulm  KEVIN   On BiPAP nightly    Vitals:   Blood pressure 96/52, pulse 83, temperature 98 °F (36 7 °C), resp  rate 17, height 5' (1 524 m), weight 47 7 kg (105 lb 2 6 oz), SpO2 97 %      Body mass index is 20 54 kg/m²  I/O last 3 completed shifts: In: 300 [P O :300]  Out: 1525 [Urine:1525]    Wt Readings from Last 10 Encounters:   02/24/23 47 7 kg (105 lb 2 6 oz)   01/17/23 48 1 kg (106 lb)   12/19/22 44 3 kg (97 lb 9 6 oz)   11/02/22 49 8 kg (109 lb 12 8 oz)   10/06/22 50 3 kg (111 lb)   09/27/22 48 5 kg (107 lb)   09/14/22 51 7 kg (114 lb)   09/02/22 49 1 kg (108 lb 3 2 oz)   08/29/22 49 5 kg (109 lb 1 6 oz)   08/26/22 53 5 kg (118 lb)     Vitals:    02/24/23 0559 02/24/23 0700 02/24/23 0902 02/24/23 1036   BP:  100/54  96/52   BP Location:       Pulse:  71  83   Resp:  17     Temp:  98 °F (36 7 °C)     TempSrc:       SpO2:   90% 97%   Weight: 47 7 kg (105 lb 2 6 oz)      Height:           Physical Exam  Constitutional:       Appearance: He is ill-appearing  HENT:      Head: Normocephalic  Nose: Nose normal       Mouth/Throat:      Mouth: Mucous membranes are moist    Eyes:      Conjunctiva/sclera: Conjunctivae normal    Neck:      Vascular: No JVD  Cardiovascular:      Rate and Rhythm: Normal rate and regular rhythm  Pulmonary:      Effort: Pulmonary effort is normal       Breath sounds: Decreased breath sounds and wheezing present  Abdominal:      General: There is no distension  Palpations: Abdomen is soft  Tenderness: There is no abdominal tenderness  Musculoskeletal:      Cervical back: Neck supple  Right lower leg: No edema  Left lower leg: No edema  Skin:     General: Skin is warm and dry  Neurological:      General: No focal deficit present  Mental Status: He is alert and oriented to person, place, and time     Psychiatric:         Mood and Affect: Mood normal          Behavior: Behavior normal              Current Facility-Administered Medications:   •  acetaminophen (TYLENOL) tablet 975 mg, 975 mg, Oral, Q8H PRN, Sushant Banai, DO, 975 mg at 02/22/23 6378  •  albuterol (PROVENTIL HFA,VENTOLIN HFA) inhaler 2 puff, 2 puff, Inhalation, Q4H PRN, Sushant Banai, DO, 2 puff at 02/23/23 1711  •  aspirin chewable tablet 81 mg, 81 mg, Oral, Daily, Kamron P Ye, DO, 81 mg at 02/24/23 1019  •  azithromycin (ZITHROMAX) tablet 500 mg, 500 mg, Oral, Q24H, Sushant Banai, DO, 500 mg at 02/23/23 1811  •  budesonide (PULMICORT) inhalation solution 0 5 mg, 0 5 mg, Nebulization, Q12H, Kamron P Ye, DO, 0 5 mg at 02/24/23 6500  •  chlorhexidine (PERIDEX) 0 12 % oral rinse 15 mL, 15 mL, Mouth/Throat, Q12H [facility], Kamron P Ye, DO, 15 mL at 02/24/23 1020  •  cyanocobalamin (VITAMIN B-12) tablet 1,000 mcg, 1,000 mcg, Oral, Daily, Lalo Moser MD, 0,967 mcg at 02/24/23 1019  •  Diclofenac Sodium (VOLTAREN) 1 % topical gel 2 g, 2 g, Topical, 4x Daily, Kamron P Ye, DO, 2 g at 02/24/23 1041  •  enoxaparin (LOVENOX) subcutaneous injection 40 mg, 40 mg, Subcutaneous, Daily, Kamron P Ye, DO, 40 mg at 02/24/23 1020  •  ergocalciferol (VITAMIN D2) capsule 50,000 Units, 50,000 Units, Oral, Q28 Days, Kamron P Ye, DO  •  escitalopram (LEXAPRO) tablet 5 mg, 5 mg, Oral, Daily, Kamron P Ye, DO, 5 mg at 02/24/23 1019  •  ferrous sulfate tablet 325 mg, 325 mg, Oral, Every Other Day, Valeta Brod, DO, 325 mg at 02/23/23 1121  •  formoterol (PERFOROMIST) nebulizer solution 20 mcg, 20 mcg, Nebulization, Q12H, Valeta Brod, DO, 20 mcg at 02/23/23 4460  •  furosemide (LASIX) tablet 20 mg, 20 mg, Oral, Daily, Kamron P Ye, DO, 20 mg at 02/24/23 1020  •  guaiFENesin (MUCINEX) 12 hr tablet 600 mg, 600 mg, Oral, Q12H [facility], Lalo Vance Moser MD, 022 mg at 02/24/23 1020  •  insulin lispro (HumaLOG) 100 units/mL subcutaneous injection 1-5 Units, 1-5 Units, Subcutaneous, TID AC, 2 Units at 02/23/23 1811 **AND** Fingerstick Glucose (POCT), , , TID AC, Mireya Spencer DO  •  insulin lispro (HumaLOG) 100 units/mL subcutaneous injection 1-5 Units, 1-5 Units, Subcutaneous, HS, Mireya Spencer DO, 1 Units at 02/23/23 2206  •  levalbuterol (XOPENEX) inhalation solution 1 25 mg, 1 25 mg, Nebulization, TID, 1 25 mg at 02/24/23 0859 **AND** ipratropium (ATROVENT) 0 02 % inhalation solution 0 5 mg, 0 5 mg, Nebulization, TID, 0 5 mg at 02/24/23 0859 **AND** [DISCONTINUED] sodium chloride 0 9 % inhalation solution 3 mL, 3 mL, Nebulization, 4x Daily, Elisabethonette Alpers, DO  •  losartan (COZAAR) tablet 12 5 mg, 12 5 mg, Oral, Daily, Curt Primes, PA-C, 12 5 mg at 02/24/23 1019  •  omeprazole (PRILOSEC) suspension 2 mg/mL, 20 mg, Oral, Daily, Kamron Ye DO, 20 mg at 02/24/23 1020  •  polyethylene glycol (MIRALAX) packet 17 g, 17 g, Oral, Daily PRN, Justin Ceron DO, 17 g at 02/23/23 1126  •  pravastatin (PRAVACHOL) tablet 80 mg, 80 mg, Oral, Daily With Dinner, Justin Ceron DO, 80 mg at 02/23/23 1810  •  [COMPLETED] predniSONE tablet 40 mg, 40 mg, Oral, Daily, 40 mg at 02/23/23 1125 **FOLLOWED BY** predniSONE tablet 30 mg, 30 mg, Oral, Daily, 30 mg at 02/24/23 1020 **FOLLOWED BY** [START ON 2/27/2023] predniSONE tablet 20 mg, 20 mg, Oral, Daily **FOLLOWED BY** [START ON 3/2/2023] predniSONE tablet 10 mg, 10 mg, Oral, Daily, Elisabeth Jose Mariaers, DO  •  [START ON 3/5/2023] predniSONE tablet 5 mg, 5 mg, Oral, Daily, Elisabeth Jose Mariaers, DO  •  senna-docusate sodium (SENOKOT S) 8 6-50 mg per tablet 1 tablet, 1 tablet, Oral, HS, Kamron Ye DO, 1 tablet at 02/23/23 2206  •  sodium chloride (OCEAN) 0 65 % nasal spray 2 spray, 2 spray, Each Nare, Q1H PRN, Derrick Specner, DO  •  tamsulosin (FLOMAX) capsule 0 4 mg, 0 4 mg, Oral, Daily With Kyle Garza DO, 0 4 mg at 02/23/23 1811    Labs & Results:      Results from last 7 days   Lab Units 02/24/23  0904 02/23/23  1154 02/20/23  0442   WBC Thousand/uL 8 41 10 13 8 60   HEMOGLOBIN g/dL 8 7* 9 9* 8 1*   HEMATOCRIT % 26 2* 31 7* 25 9*   PLATELETS Thousands/uL 237 281 233         Results from last 7 days   Lab Units 02/24/23  0600 02/23/23  1154 02/22/23  0451   POTASSIUM mmol/L 3 8 4 0 3 3*   CHLORIDE mmol/L 93* 90* 89*   CO2 mmol/L 33* 33* 37*   BUN mg/dL 13 14 26*   CREATININE mg/dL 0 32* 0 44* 0 37*   CALCIUM mg/dL 8 4 8 7 8 5             Thank you for the opportunity to participate in the care of this patient      Ayesha Cervantes PA-C  Advanced Heart Failure  [address] [place] [address]

## 2023-02-25 LAB
ANION GAP SERPL CALCULATED.3IONS-SCNC: 6 MMOL/L (ref 4–13)
BUN SERPL-MCNC: 14 MG/DL (ref 5–25)
CALCIUM SERPL-MCNC: 8.4 MG/DL (ref 8.3–10.1)
CHLORIDE SERPL-SCNC: 91 MMOL/L (ref 96–108)
CO2 SERPL-SCNC: 32 MMOL/L (ref 21–32)
CREAT SERPL-MCNC: 0.4 MG/DL (ref 0.6–1.3)
ERYTHROCYTE [DISTWIDTH] IN BLOOD BY AUTOMATED COUNT: 13.1 % (ref 11.6–15.1)
GFR SERPL CREATININE-BSD FRML MDRD: 124 ML/MIN/1.73SQ M
GLUCOSE SERPL-MCNC: 195 MG/DL (ref 65–140)
GLUCOSE SERPL-MCNC: 211 MG/DL (ref 65–140)
GLUCOSE SERPL-MCNC: 85 MG/DL (ref 65–140)
GLUCOSE SERPL-MCNC: 85 MG/DL (ref 65–140)
HCT VFR BLD AUTO: 24.3 % (ref 36.5–49.3)
HGB BLD-MCNC: 8 G/DL (ref 12–17)
MCH RBC QN AUTO: 31.9 PG (ref 26.8–34.3)
MCHC RBC AUTO-ENTMCNC: 32.9 G/DL (ref 31.4–37.4)
MCV RBC AUTO: 97 FL (ref 82–98)
PLATELET # BLD AUTO: 213 THOUSANDS/UL (ref 149–390)
PMV BLD AUTO: 10 FL (ref 8.9–12.7)
POTASSIUM SERPL-SCNC: 3.2 MMOL/L (ref 3.5–5.3)
PROCALCITONIN SERPL-MCNC: 0.05 NG/ML
RBC # BLD AUTO: 2.51 MILLION/UL (ref 3.88–5.62)
SODIUM SERPL-SCNC: 129 MMOL/L (ref 135–147)
WBC # BLD AUTO: 7.14 THOUSAND/UL (ref 4.31–10.16)

## 2023-02-25 RX ORDER — POTASSIUM CHLORIDE 20 MEQ/1
40 TABLET, EXTENDED RELEASE ORAL ONCE
Status: COMPLETED | OUTPATIENT
Start: 2023-02-25 | End: 2023-02-25

## 2023-02-25 RX ADMIN — Medication 20 MG: at 09:25

## 2023-02-25 RX ADMIN — DICLOFENAC SODIUM 2 G: 10 GEL TOPICAL at 12:02

## 2023-02-25 RX ADMIN — LEVALBUTEROL HYDROCHLORIDE 1.25 MG: 1.25 SOLUTION, CONCENTRATE RESPIRATORY (INHALATION) at 19:06

## 2023-02-25 RX ADMIN — PRAVASTATIN SODIUM 80 MG: 20 TABLET ORAL at 17:03

## 2023-02-25 RX ADMIN — IPRATROPIUM BROMIDE 0.5 MG: 0.5 SOLUTION RESPIRATORY (INHALATION) at 19:06

## 2023-02-25 RX ADMIN — BUDESONIDE 0.5 MG: 0.5 INHALANT ORAL at 09:02

## 2023-02-25 RX ADMIN — GUAIFENESIN 600 MG: 600 TABLET, EXTENDED RELEASE ORAL at 22:21

## 2023-02-25 RX ADMIN — SENNOSIDES AND DOCUSATE SODIUM 1 TABLET: 8.6; 5 TABLET ORAL at 22:30

## 2023-02-25 RX ADMIN — LEVALBUTEROL HYDROCHLORIDE 1.25 MG: 1.25 SOLUTION, CONCENTRATE RESPIRATORY (INHALATION) at 09:02

## 2023-02-25 RX ADMIN — INSULIN LISPRO 1 UNITS: 100 INJECTION, SOLUTION INTRAVENOUS; SUBCUTANEOUS at 22:27

## 2023-02-25 RX ADMIN — ASPIRIN 81 MG CHEWABLE TABLET 81 MG: 81 TABLET CHEWABLE at 09:26

## 2023-02-25 RX ADMIN — INSULIN LISPRO 1 UNITS: 100 INJECTION, SOLUTION INTRAVENOUS; SUBCUTANEOUS at 17:22

## 2023-02-25 RX ADMIN — FORMOTEROL FUMARATE DIHYDRATE 20 MCG: 20 SOLUTION RESPIRATORY (INHALATION) at 19:06

## 2023-02-25 RX ADMIN — FORMOTEROL FUMARATE DIHYDRATE 20 MCG: 20 SOLUTION RESPIRATORY (INHALATION) at 09:02

## 2023-02-25 RX ADMIN — REMDESIVIR 100 MG: 100 INJECTION, POWDER, LYOPHILIZED, FOR SOLUTION INTRAVENOUS at 17:04

## 2023-02-25 RX ADMIN — LOSARTAN POTASSIUM 12.5 MG: 25 TABLET, FILM COATED ORAL at 09:26

## 2023-02-25 RX ADMIN — GUAIFENESIN 600 MG: 600 TABLET, EXTENDED RELEASE ORAL at 09:30

## 2023-02-25 RX ADMIN — CYANOCOBALAMIN TAB 500 MCG 1000 MCG: 500 TAB at 09:27

## 2023-02-25 RX ADMIN — IPRATROPIUM BROMIDE 0.5 MG: 0.5 SOLUTION RESPIRATORY (INHALATION) at 14:43

## 2023-02-25 RX ADMIN — ENOXAPARIN SODIUM 40 MG: 40 INJECTION SUBCUTANEOUS at 09:27

## 2023-02-25 RX ADMIN — TAMSULOSIN HYDROCHLORIDE 0.4 MG: 0.4 CAPSULE ORAL at 17:04

## 2023-02-25 RX ADMIN — CHLORHEXIDINE GLUCONATE 0.12% ORAL RINSE 15 ML: 1.2 LIQUID ORAL at 22:21

## 2023-02-25 RX ADMIN — INSULIN LISPRO 2 UNITS: 100 INJECTION, SOLUTION INTRAVENOUS; SUBCUTANEOUS at 12:37

## 2023-02-25 RX ADMIN — POTASSIUM CHLORIDE 40 MEQ: 1500 TABLET, EXTENDED RELEASE ORAL at 14:57

## 2023-02-25 RX ADMIN — DICLOFENAC SODIUM 2 G: 10 GEL TOPICAL at 09:28

## 2023-02-25 RX ADMIN — ESCITALOPRAM OXALATE 5 MG: 5 TABLET, FILM COATED ORAL at 13:12

## 2023-02-25 RX ADMIN — BUDESONIDE 0.5 MG: 0.5 INHALANT ORAL at 19:06

## 2023-02-25 RX ADMIN — LEVALBUTEROL HYDROCHLORIDE 1.25 MG: 1.25 SOLUTION, CONCENTRATE RESPIRATORY (INHALATION) at 14:42

## 2023-02-25 RX ADMIN — CHLORHEXIDINE GLUCONATE 0.12% ORAL RINSE 15 ML: 1.2 LIQUID ORAL at 09:27

## 2023-02-25 RX ADMIN — PREDNISONE 30 MG: 20 TABLET ORAL at 09:25

## 2023-02-25 RX ADMIN — DICLOFENAC SODIUM 2 G: 10 GEL TOPICAL at 22:16

## 2023-02-25 RX ADMIN — DICLOFENAC SODIUM 2 G: 10 GEL TOPICAL at 17:22

## 2023-02-25 RX ADMIN — IPRATROPIUM BROMIDE 0.5 MG: 0.5 SOLUTION RESPIRATORY (INHALATION) at 09:02

## 2023-02-25 RX ADMIN — FERROUS SULFATE TAB 325 MG (65 MG ELEMENTAL FE) 325 MG: 325 (65 FE) TAB at 09:26

## 2023-02-25 NOTE — RESPIRATORY THERAPY NOTE
02/23/23 2300   Respiratory Assessment   Resp Comments pt covid positive, will need hepa filter in room or to be moved to negative pressure in order to be placed on bipap  RN aware

## 2023-02-25 NOTE — PROGRESS NOTES
[facility]  Progress Note - Mayo Blake  1955, 74 y o  male MRN: [id number]  Unit/Bed#: -01 Encounter: [id number]  Primary Care Provider: Reny Agee DO   Date and time admitted to hospital: 2/12/2023  5:07 AM    * Chronic obstructive pulmonary disease ([address] )  Assessment & Plan  · Follows with Pulm as an outpatient  · Last FEV1 22%, consistent with very-severe COPD  · Home regimen:  · 3L O2 at home  · All nebs- Duo-Neb/Pulmicort/Perforomist  · Albuterol rescue inhaler  · Prednisone 5 daily  · Multiple prior exacerbations requiring admission  · Intubated 2/12, extubated 2/15  · Currently requiring 4 L supplemental O2  · Wean O2 as able  · Maintain O2 sats between 88-92%  · Continue bronchodilators - scheduled Xopenx/Atrovent TID, perforomist/pulmicort BID  · Status post Solu-medrol 40 mg TID; now continue PO taper as per Pulm recs  · Aggressive pulmonary hygiene- use of incentive spirometry, VEST/airway clearance, OOB to chair as able  · Palliative consulted, appreciate recommendations  · Goals of care discussed prior with patient with wife at bedside, patient to remain level 3 DNR/DNI       Acute on chronic respiratory failure with hypercapnia (HCC)  Assessment & Plan  · Chronically on 3 L at home  · Currently on 4 L supplemental O2  · Continue with treatment plan as above      COVID-19 virus infection  Assessment & Plan  · Patient tested positive for COVID on 2/22/2023  Patient on 4L nasal cannula  · Monitor WBC and fever curve  · Continue oxygen segmentation to maintain SPO2 >88%  · Pulm re-evaluated- they recommended to continue current steroid taper, consider antiviral (started remdesivir x 3 days per protocol given patient's risk factors: age, chronic heart and lung disease), no need for escalated anti-inflammatory per Pulm  · CXR revealed new bibasilar opacity, L>R  · Procal negative x2  Abx discontinued per Pulm recs       Acute on chronic HFrEF (heart failure with reduced ejection fraction) (HCC)  Assessment & Plan  · NYHA class: III  / ACC/AHA stage: C; in setting of very severe COPD (FEV1 22%)  · Follows with Dr Janeen Mercedes as an outpatient  · EF 40% PTA on lasix 20mg qd at home  · TTE on admission showing EF 20-25% with abnormal septal motion in setting of chronic LBBB, RVSP 38, mildly dilated RV  · Clinical presentation on arrival consistent with biventricular heart failure in setting of AECOPD  · Repeated TTE on 2/21 to reassess EF showed EF 30%  · Continue PO Lasix 20mg daily  · Goal-directed medical therapy  · Sudden cardiac death risk reduction:  · Not candidate for ICD/Life Vest candidacy due to DNR/DNI  · Of note, patient has known hx of LBBB with QRS around 120ms  · Cardiac diet, sodium restriction <2g, fluid restriction <1 5L  · Daily I&Os  · MAP goal >65   · Consulted heart failure service given newly decreased ejection fraction, appreciate recs  -Discontinued metoprolol due to wheezing  -Continue losartan 12 5 mg  -Plan to start MRA in the future  -Outpatient cardiology/heart failure follow-up    KEVIN and COPD overlap syndrome (HCC)  Assessment & Plan  · Uses BiPAP at home  · Continue BiPAP 12/6/40% qHs    Nonobstructive atherosclerosis of coronary artery  Assessment & Plan  · Coronary calcification seen on prior CT   · Follows with Dr Janeen Mercedes as an outpatient  · Continue with ASCVD prophylaxis with ASA and statin      Physical deconditioning  Assessment & Plan  · Evaluated by PT/OT with recommendation for postacute rehab  · Case management following, plan for [facility]     Anemia  Assessment & Plan  · No overt signs of GI bleed  · Iron panel suggesting mixed iron deficiency with likely anemia of chronic disease contribution  · Retic index   74 suggesting hypoproliferation  · Elevated MCV  · Continue ferrous sulfate supplementation   · Trend CBC, transfuse if Hgb drops below 7  · Given patient has signs of numbness/tingling on the tip of the fingers, was started on vitamin B12 supplementation  · On DVT ppx     Gastroesophageal reflux disease  Assessment & Plan  · Continue PPI         VTE Pharmacologic Prophylaxis: VTE Score: 3 Moderate Risk (Score 3-4) - Pharmacological DVT Prophylaxis Ordered: enoxaparin (Lovenox)  Patient Centered Rounds: I performed bedside rounds with nursing staff today  Discussions with Specialists or Other Care Team Provider:      Education and Discussions with Family / Patient: Updated  (wife) via phone  Total Time Spent on Date of Encounter in care of patient: 35 minutes This time was spent on one or more of the following: performing physical exam; counseling and coordination of care; obtaining or reviewing history; documenting in the medical record; reviewing/ordering tests, medications or procedures; communicating with other healthcare professionals and discussing with patient's family/caregivers  Current Length of Stay: 13 day(s)  Current Patient Status: Inpatient   Certification Statement: The patient will continue to require additional inpatient hospital stay due to remdesivir, rehab  Discharge Plan: Anticipate discharge in 48 hrs to rehab facility  Code Status: Level 3 - DNAR and DNI    Subjective:   Mr Louise Pedraza reports his breathing feels about the same today  Denies CP    Objective:     Vitals:   Temp (24hrs), Av 7 °F (36 5 °C), Min:97 6 °F (36 4 °C), Max:97 8 °F (36 6 °C)    Temp:  [97 6 °F (36 4 °C)-97 8 °F (36 6 °C)] 97 6 °F (36 4 °C)  HR:  [73-86] 80  Resp:  [16] 16  BP: ()/(45-52) 104/52  SpO2:  [96 %-99 %] 98 %  Body mass index is 20 24 kg/m²  Input and Output Summary (last 24 hours): Intake/Output Summary (Last 24 hours) at 2023 1532  Last data filed at 2023 1504  Gross per 24 hour   Intake 942 ml   Output --   Net 942 ml       Physical Exam:   Physical Exam  Vitals reviewed     Constitutional:       Comments: Patient seen sitting in bedside chair, chronically ill appearing but NAD   Cardiovascular:      Rate and Rhythm: Normal rate and regular rhythm  Pulmonary:      Effort: No respiratory distress  Breath sounds: No wheezing  Comments: Not tachypneic today  Breathing more comfortably than yesterday  Decreased breath sounds  98% on 4L  Abdominal:      General: Bowel sounds are normal       Palpations: Abdomen is soft  Tenderness: There is no abdominal tenderness  Musculoskeletal:      Right lower leg: No edema  Left lower leg: No edema  Skin:     General: Skin is warm  Neurological:      Mental Status: He is alert and oriented to person, place, and time     Psychiatric:         Mood and Affect: Mood normal          Behavior: Behavior normal          Additional Data:     Labs:  Results from last 7 days   Lab Units 02/25/23  0518 02/24/23  0904   WBC Thousand/uL 7 14 8 41   HEMOGLOBIN g/dL 8 0* 8 7*   HEMATOCRIT % 24 3* 26 2*   PLATELETS Thousands/uL 213 237   NEUTROS PCT %  --  78*   LYMPHS PCT %  --  7*   MONOS PCT %  --  14*   EOS PCT %  --  0     Results from last 7 days   Lab Units 02/25/23  0518   SODIUM mmol/L 129*   POTASSIUM mmol/L 3 2*   CHLORIDE mmol/L 91*   CO2 mmol/L 32   BUN mg/dL 14   CREATININE mg/dL 0 40*   ANION GAP mmol/L 6   CALCIUM mg/dL 8 4   GLUCOSE RANDOM mg/dL 85         Results from last 7 days   Lab Units 02/25/23  0515 02/24/23  2050 02/24/23  1715 02/24/23  1240 02/24/23  0556 02/23/23  2010 02/23/23  1807 02/23/23  1131 02/23/23  0647 02/22/23  2216 02/22/23  1603 02/22/23  1157   POC GLUCOSE mg/dl 85 212* 195* 116 90 211* 241* 108 91 135 122 163*         Results from last 7 days   Lab Units 02/25/23  0518 02/24/23  0729   PROCALCITONIN ng/ml 0 05 0 05       Lines/Drains:  Invasive Devices     Peripheral Intravenous Line  Duration           Peripheral IV 02/21/23 Left;Proximal;Ventral (anterior) Forearm 4 days                      Imaging: Reviewed radiology reports from this admission including: chest xray    Recent Cultures (last 7 days):         Last 24 Hours Medication List:   Current Facility-Administered Medications   Medication Dose Route Frequency Provider Last Rate   • acetaminophen  975 mg Oral Q8H PRN Sushant Banai, DO     • albuterol  2 puff Inhalation Q4H PRN Sushant Banai, DO     • aspirin  81 mg Oral Daily Kamron P Ye, DO     • budesonide  0 5 mg Nebulization Q12H Everlene Smaller, DO     • chlorhexidine  15 mL Mouth/Throat Q12H [address] [facility] Ye, DO     • vitamin B-12  1,000 mcg Oral Daily Lazara Moser MD     • Diclofenac Sodium  2 g Topical 4x Daily Everlene Smaller, DO     • enoxaparin  40 mg Subcutaneous Daily Everlene Smaller, DO     • ergocalciferol  50,000 Units Oral Q28 Days Everlene Smaller, DO     • escitalopram  5 mg Oral Daily Everlene Smaller, DO     • ferrous sulfate  325 mg Oral Every Other Day Everlene Smaller, DO     • formoterol  20 mcg Nebulization Q12H Everlene Smaller, DO     • furosemide  20 mg Oral Daily Everlene Smaller, DO     • guaiFENesin  600 mg Oral Q12H [facility] Lazara Moser MD     • insulin lispro  1-5 Units Subcutaneous TID AC Andrew Jonas Spencer, DO     • insulin lispro  1-5 Units Subcutaneous HS Andrew Jonas Spencer, DO     • levalbuterol  1 25 mg Nebulization TID Everlene Smaller, DO      And   • ipratropium  0 5 mg Nebulization TID Everlene Smaller, DO     • losartan  12 5 mg Oral Daily Odalys Flores PA-C     • omeprazole (PRILOSEC) suspension 2 mg/mL  20 mg Oral Daily Kamron P Ye, DO     • polyethylene glycol  17 g Oral Daily PRN Everlene Smaller, DO     • pravastatin  80 mg Oral Daily With [address] [facility] Ye, DO     • predniSONE  30 mg Oral Daily Pervis Maynor, DO      Followed by   • [START ON 2/27/2023] predniSONE  20 mg Oral Daily Pervis Maynor, DO      Followed by   • [START ON 3/2/2023] predniSONE  10 mg Oral Daily Pervis Mayonr, DO     • [START ON 3/5/2023] predniSONE  5 mg Oral Daily Pervis Maynor, DO • remdesivir  100 mg Intravenous Q24H Silvia Esteban PA-C     • senna-docusate sodium  1 tablet Oral HS Silver Laura, DO     • sodium chloride  2 spray Each Nare Q1H PRN Gisel Mari Spencer, DO     • tamsulosin  0 4 mg Oral Daily With [address], DO          Today, Patient Was Seen By: Silvia Esteban PA-C    **Please Note: This note may have been constructed using a voice recognition system  **

## 2023-02-26 LAB
ANION GAP SERPL CALCULATED.3IONS-SCNC: 3 MMOL/L (ref 4–13)
BUN SERPL-MCNC: 14 MG/DL (ref 5–25)
CALCIUM SERPL-MCNC: 8.3 MG/DL (ref 8.3–10.1)
CHLORIDE SERPL-SCNC: 94 MMOL/L (ref 96–108)
CO2 SERPL-SCNC: 32 MMOL/L (ref 21–32)
CREAT SERPL-MCNC: 0.36 MG/DL (ref 0.6–1.3)
ERYTHROCYTE [DISTWIDTH] IN BLOOD BY AUTOMATED COUNT: 13.2 % (ref 11.6–15.1)
GFR SERPL CREATININE-BSD FRML MDRD: 130 ML/MIN/1.73SQ M
GLUCOSE SERPL-MCNC: 195 MG/DL (ref 65–140)
GLUCOSE SERPL-MCNC: 247 MG/DL (ref 65–140)
GLUCOSE SERPL-MCNC: 284 MG/DL (ref 65–140)
GLUCOSE SERPL-MCNC: 82 MG/DL (ref 65–140)
GLUCOSE SERPL-MCNC: 99 MG/DL (ref 65–140)
HCT VFR BLD AUTO: 24.9 % (ref 36.5–49.3)
HGB BLD-MCNC: 7.8 G/DL (ref 12–17)
MCH RBC QN AUTO: 31.3 PG (ref 26.8–34.3)
MCHC RBC AUTO-ENTMCNC: 31.3 G/DL (ref 31.4–37.4)
MCV RBC AUTO: 100 FL (ref 82–98)
PLATELET # BLD AUTO: 201 THOUSANDS/UL (ref 149–390)
PMV BLD AUTO: 9.6 FL (ref 8.9–12.7)
POTASSIUM SERPL-SCNC: 4.1 MMOL/L (ref 3.5–5.3)
RBC # BLD AUTO: 2.49 MILLION/UL (ref 3.88–5.62)
SODIUM SERPL-SCNC: 129 MMOL/L (ref 135–147)
WBC # BLD AUTO: 7.96 THOUSAND/UL (ref 4.31–10.16)

## 2023-02-26 RX ADMIN — Medication 20 MG: at 09:43

## 2023-02-26 RX ADMIN — CHLORHEXIDINE GLUCONATE 0.12% ORAL RINSE 15 ML: 1.2 LIQUID ORAL at 09:43

## 2023-02-26 RX ADMIN — INSULIN LISPRO 2 UNITS: 100 INJECTION, SOLUTION INTRAVENOUS; SUBCUTANEOUS at 22:17

## 2023-02-26 RX ADMIN — REMDESIVIR 100 MG: 100 INJECTION, POWDER, LYOPHILIZED, FOR SOLUTION INTRAVENOUS at 17:30

## 2023-02-26 RX ADMIN — BUDESONIDE 0.5 MG: 0.5 INHALANT ORAL at 19:54

## 2023-02-26 RX ADMIN — CYANOCOBALAMIN TAB 500 MCG 1000 MCG: 500 TAB at 09:42

## 2023-02-26 RX ADMIN — CHLORHEXIDINE GLUCONATE 0.12% ORAL RINSE 15 ML: 1.2 LIQUID ORAL at 22:16

## 2023-02-26 RX ADMIN — PRAVASTATIN SODIUM 80 MG: 20 TABLET ORAL at 17:30

## 2023-02-26 RX ADMIN — LEVALBUTEROL HYDROCHLORIDE 1.25 MG: 1.25 SOLUTION, CONCENTRATE RESPIRATORY (INHALATION) at 19:54

## 2023-02-26 RX ADMIN — IPRATROPIUM BROMIDE 0.5 MG: 0.5 SOLUTION RESPIRATORY (INHALATION) at 08:50

## 2023-02-26 RX ADMIN — DICLOFENAC SODIUM 2 G: 10 GEL TOPICAL at 09:44

## 2023-02-26 RX ADMIN — DICLOFENAC SODIUM 2 G: 10 GEL TOPICAL at 17:31

## 2023-02-26 RX ADMIN — ESCITALOPRAM OXALATE 5 MG: 5 TABLET, FILM COATED ORAL at 09:43

## 2023-02-26 RX ADMIN — ASPIRIN 81 MG CHEWABLE TABLET 81 MG: 81 TABLET CHEWABLE at 09:42

## 2023-02-26 RX ADMIN — IPRATROPIUM BROMIDE 0.5 MG: 0.5 SOLUTION RESPIRATORY (INHALATION) at 19:54

## 2023-02-26 RX ADMIN — ENOXAPARIN SODIUM 40 MG: 40 INJECTION SUBCUTANEOUS at 09:43

## 2023-02-26 RX ADMIN — GUAIFENESIN 600 MG: 600 TABLET, EXTENDED RELEASE ORAL at 09:42

## 2023-02-26 RX ADMIN — INSULIN LISPRO 2 UNITS: 100 INJECTION, SOLUTION INTRAVENOUS; SUBCUTANEOUS at 12:34

## 2023-02-26 RX ADMIN — FORMOTEROL FUMARATE DIHYDRATE 20 MCG: 20 SOLUTION RESPIRATORY (INHALATION) at 08:50

## 2023-02-26 RX ADMIN — DICLOFENAC SODIUM 2 G: 10 GEL TOPICAL at 22:18

## 2023-02-26 RX ADMIN — DICLOFENAC SODIUM 2 G: 10 GEL TOPICAL at 12:34

## 2023-02-26 RX ADMIN — TAMSULOSIN HYDROCHLORIDE 0.4 MG: 0.4 CAPSULE ORAL at 17:30

## 2023-02-26 RX ADMIN — GUAIFENESIN 600 MG: 600 TABLET, EXTENDED RELEASE ORAL at 22:16

## 2023-02-26 RX ADMIN — SENNOSIDES AND DOCUSATE SODIUM 1 TABLET: 8.6; 5 TABLET ORAL at 22:16

## 2023-02-26 RX ADMIN — IPRATROPIUM BROMIDE 0.5 MG: 0.5 SOLUTION RESPIRATORY (INHALATION) at 14:26

## 2023-02-26 RX ADMIN — FORMOTEROL FUMARATE DIHYDRATE 20 MCG: 20 SOLUTION RESPIRATORY (INHALATION) at 19:54

## 2023-02-26 RX ADMIN — INSULIN LISPRO 3 UNITS: 100 INJECTION, SOLUTION INTRAVENOUS; SUBCUTANEOUS at 17:35

## 2023-02-26 RX ADMIN — LOSARTAN POTASSIUM 12.5 MG: 25 TABLET, FILM COATED ORAL at 09:42

## 2023-02-26 RX ADMIN — LEVALBUTEROL HYDROCHLORIDE 1.25 MG: 1.25 SOLUTION, CONCENTRATE RESPIRATORY (INHALATION) at 08:50

## 2023-02-26 RX ADMIN — PREDNISONE 30 MG: 20 TABLET ORAL at 09:42

## 2023-02-26 RX ADMIN — BUDESONIDE 0.5 MG: 0.5 INHALANT ORAL at 08:50

## 2023-02-26 RX ADMIN — LEVALBUTEROL HYDROCHLORIDE 1.25 MG: 1.25 SOLUTION, CONCENTRATE RESPIRATORY (INHALATION) at 14:26

## 2023-02-26 NOTE — PROGRESS NOTES
[facility]  Progress Note - Valora Care  1955, 74 y o  male MRN: [id number]  Unit/Bed#: -01 Encounter: [id number]  Primary Care Provider: Laine Kay DO   Date and time admitted to hospital: 2/12/2023  5:07 AM    * Chronic obstructive pulmonary disease ([address] )  Assessment & Plan  · Follows with Pulm as an outpatient  · Last FEV1 22%, consistent with very-severe COPD  · Home regimen:  · 3L O2 at home  · All nebs- Duo-Neb/Pulmicort/Perforomist  · Albuterol rescue inhaler  · Prednisone 5 daily  · Multiple prior exacerbations requiring admission  · Intubated 2/12, extubated 2/15  · Currently requiring 4 L supplemental O2  · Wean O2 as able  · Maintain O2 sats between 88-92%  · Continue bronchodilators - scheduled Xopenx/Atrovent TID, perforomist/pulmicort BID  · Status post Solu-medrol 40 mg TID; now continue PO taper as per Pulm recs  · Aggressive pulmonary hygiene- use of incentive spirometry, VEST/airway clearance, OOB to chair as able  · Palliative consulted, appreciate recommendations  · Goals of care discussed prior with patient with wife at bedside, patient to remain level 3 DNR/DNI       Acute on chronic respiratory failure with hypercapnia (HCC)  Assessment & Plan  · Chronically on 3 L at home  · Currently on baseline 3 L supplemental O2  · Continue with treatment plan as above      COVID-19 virus infection  Assessment & Plan  · Patient tested positive for COVID on 2/22/2023  Patient on 3L nasal cannula  · Monitor WBC and fever curve  · Continue oxygen segmentation to maintain SPO2 >88%  · Pulm re-evaluated- they recommended to continue current steroid taper, consider antiviral (started remdesivir x 3 days (today is day #3) per protocol given patient's risk factors: age, chronic heart and lung disease), no need for escalated anti-inflammatory per Pulm  · CXR revealed new bibasilar opacity, L>R  · Procal negative x2  Abx discontinued per Pulm recs  Acute on chronic HFrEF (heart failure with reduced ejection fraction) (HCC)  Assessment & Plan  · NYHA class: III  / ACC/AHA stage: C; in setting of very severe COPD (FEV1 22%)  · Follows with Dr Divya Lopez as an outpatient  · EF 40% PTA on lasix 20mg qd at home  · TTE on admission showing EF 20-25% with abnormal septal motion in setting of chronic LBBB, RVSP 38, mildly dilated RV  · Clinical presentation on arrival consistent with biventricular heart failure in setting of AECOPD  · Repeated TTE on 2/21 to reassess EF showed EF 30%  · Continue PO Lasix 20mg daily  · Goal-directed medical therapy  · Sudden cardiac death risk reduction:  · Not candidate for ICD/Life Vest candidacy due to DNR/DNI  · Of note, patient has known hx of LBBB with QRS around 120ms  · Cardiac diet, sodium restriction <2g, fluid restriction <1 5L  · Daily I&Os  · MAP goal >65   · Consulted heart failure service given newly decreased ejection fraction, appreciate recs  -Discontinued metoprolol due to wheezing  -Continue losartan 12 5 mg  -Plan to start MRA in the future  -Outpatient cardiology/heart failure follow-up    KEVIN and COPD overlap syndrome (HCC)  Assessment & Plan  · Uses BiPAP at home  · Continue BiPAP 12/6/40% qHs    Nonobstructive atherosclerosis of coronary artery  Assessment & Plan  · Coronary calcification seen on prior CT   · Follows with Dr Divya Lopez as an outpatient  · Continue with ASCVD prophylaxis with ASA and statin      Physical deconditioning  Assessment & Plan  · Evaluated by PT/OT with recommendation for postacute rehab  · Case management following, plan for [facility] [place]     Anemia  Assessment & Plan  · No overt signs of GI bleed  · Iron panel suggesting mixed iron deficiency with likely anemia of chronic disease contribution  · Retic index   74 suggesting hypoproliferation  · Elevated MCV  · Continue ferrous sulfate supplementation   · Trend CBC, transfuse if Hgb drops below 7  · Given patient has signs of numbness/tingling on the tip of the fingers, was started on vitamin B12 supplementation  · On DVT ppx   · CBC in AM    Gastroesophageal reflux disease  Assessment & Plan  · Continue PPI    Protein-calorie malnutrition ([address] )  Assessment & Plan  · Patient noted to be cachectic/ill-appearing  · 2/2 catabolic illness/COPD   · Continue Ensure supplementations with daily meals        VTE Pharmacologic Prophylaxis: VTE Score: 3 Moderate Risk (Score 3-4) - Pharmacological DVT Prophylaxis Ordered: enoxaparin (Lovenox)  Patient Centered Rounds: I performed bedside rounds with nursing staff today  [address] with Specialists or Other Care Team Provider:     Education and Discussions with Family / Patient: updated the patient's wife outside the patient's room when she came to visit   Total Time Spent on Date of Encounter in care of patient: 35 minutes This time was spent on one or more of the following: performing physical exam; counseling and coordination of care; obtaining or reviewing history; documenting in the medical record; reviewing/ordering tests, medications or procedures; communicating with other healthcare professionals and discussing with patient's family/caregivers  Current Length of Stay: 14 day(s)  Current Patient Status: Inpatient   Certification Statement: The patient will continue to require additional inpatient hospital stay due to remdesivir, rehab, monitoring symptoms and hgb  Discharge Plan: Anticipate discharge tomorrow to rehab facility  Code Status: Level 3 - DNAR and DNI    Subjective:   Mr Raquel Humphries reports that his breathing feels the same today  Denies CP    Objective:     Vitals:   Temp (24hrs), Av 6 °F (36 4 °C), Min:97 5 °F (36 4 °C), Max:97 6 °F (36 4 °C)    Temp:  [97 5 °F (36 4 °C)-97 6 °F (36 4 °C)] 97 6 °F (36 4 °C)  HR:  [80-83] 83  Resp:  [16-18] 16  BP: ()/(52-64) 96/56  SpO2:  [95 %-100 %] 95 %  Body mass index is 20 24 kg/m²       Input and Output Summary (last 24 hours): Intake/Output Summary (Last 24 hours) at 2/26/2023 1445  Last data filed at 2/26/2023 0901  Gross per 24 hour   Intake 1296 ml   Output 825 ml   Net 471 ml       Physical Exam:   Physical Exam  Vitals reviewed  Constitutional:       Comments: Patient seen sitting in bed, NAD, chronically ill appearing   Cardiovascular:      Rate and Rhythm: Normal rate and regular rhythm  Pulmonary:      Effort: Pulmonary effort is normal       Breath sounds: Wheezing present  Comments: 3L NC O2, 99%  Course breath sounds  Abdominal:      General: Bowel sounds are normal       Palpations: Abdomen is soft  Tenderness: There is no abdominal tenderness  Musculoskeletal:      Right lower leg: No edema  Left lower leg: No edema  Skin:     General: Skin is warm  Neurological:      Mental Status: He is alert and oriented to person, place, and time  Psychiatric:         Mood and Affect: Mood normal          Behavior: Behavior normal          Additional Data:     Labs:  Results from last 7 days   Lab Units 02/26/23  0540 02/25/23  0518 02/24/23  0904   WBC Thousand/uL 7 96   < > 8 41   HEMOGLOBIN g/dL 7 8*   < > 8 7*   HEMATOCRIT % 24 9*   < > 26 2*   PLATELETS Thousands/uL 201   < > 237   NEUTROS PCT %  --   --  78*   LYMPHS PCT %  --   --  7*   MONOS PCT %  --   --  14*   EOS PCT %  --   --  0    < > = values in this interval not displayed       Results from last 7 days   Lab Units 02/26/23  0540   SODIUM mmol/L 129*   POTASSIUM mmol/L 4 1   CHLORIDE mmol/L 94*   CO2 mmol/L 32   BUN mg/dL 14   CREATININE mg/dL 0 36*   ANION GAP mmol/L 3*   CALCIUM mg/dL 8 3   GLUCOSE RANDOM mg/dL 82         Results from last 7 days   Lab Units 02/26/23  1210 02/26/23  0608 02/25/23  2223 02/25/23  1720 02/25/23  1236 02/25/23  0515 02/24/23  2050 02/24/23  1715 02/24/23  1240 02/24/23  0556 02/23/23 2010 02/23/23  1807   POC GLUCOSE mg/dl 247* 99 195* 195* 211* 85 212* 195* 116 90 211* 241*         Results from last 7 days   Lab Units 02/25/23  0518 02/24/23  0729   PROCALCITONIN ng/ml 0 05 0 05       Lines/Drains:  Invasive Devices     Peripheral Intravenous Line  Duration           Peripheral IV 02/25/23 Left;Ventral (anterior) Forearm <1 day                      Imaging: No pertinent imaging reviewed      Recent Cultures (last 7 days):         Last 24 Hours Medication List:   Current Facility-Administered Medications   Medication Dose Route Frequency Provider Last Rate   • acetaminophen  975 mg Oral Q8H PRN Sushant Hilliard, DO     • albuterol  2 puff Inhalation Q4H PRN Sushant Hilliard, DO     • aspirin  81 mg Oral Daily Kamronjulienne Ye, DO     • budesonide  0 5 mg Nebulization Q12H Gerhardt Sella, DO     • chlorhexidine  15 mL Mouth/Throat Q12H [facility] Cassi, DO     • vitamin B-12  1,000 mcg Oral Daily Shan Moser MD     • Diclofenac Sodium  2 g Topical 4x Daily Gerhardt Sella, DO     • enoxaparin  40 mg Subcutaneous Daily Gerhardt Sella, DO     • ergocalciferol  50,000 Units Oral Q28 Days Gerhardt Sella, DO     • escitalopram  5 mg Oral Daily Gerhardt Sella, DO     • ferrous sulfate  325 mg Oral Every Other Day Gerhardt Sella, DO     • formoterol  20 mcg Nebulization Q12H Gerhardt Sella, DO     • furosemide  20 mg Oral Daily Gerhardt Sella, DO     • guaiFENesin  600 mg Oral Q12H [facility] Shan Moser MD     • insulin lispro  1-5 Units Subcutaneous TID [facility] Mojgan Spencer, DO     • insulin lispro  1-5 Units Subcutaneous HS Mojgan Spencer, DO     • levalbuterol  1 25 mg Nebulization TID Gerhardt Sella, DO      And   • ipratropium  0 5 mg Nebulization TID Gerhardt Sella, DO     • losartan  12 5 mg Oral Daily Jean-Paul Gavin PA-C     • omeprazole (PRILOSEC) suspension 2 mg/mL  20 mg Oral Daily Kamronjulienne Ye, DO     • polyethylene glycol  17 g Oral Daily PRN Gerhardt Sella, DO     • pravastatin  80 mg Oral Daily With Ramakrishna Zuleta DO     • [START ON 2/27/2023] predniSONE  20 mg Oral Daily Claretha Tipton, DO      Followed by   • [START ON 3/2/2023] predniSONE  10 mg Oral Daily Claretha Tipton, DO     • [START ON 3/5/2023] predniSONE  5 mg Oral Daily Claretha Tipton, DO     • remdesivir  100 mg Intravenous Q24H Luda Diaz PA-C     • senna-docusate sodium  1 tablet Oral HS Misty Randall, DO     • sodium chloride  2 spray Each Nare Q1H PRN Cirilo Spencer, DO     • tamsulosin  0 4 mg Oral Daily With [employer], DO          Today, Patient Was Seen By: Luda Diaz PA-C    **Please Note: This note may have been constructed using a voice recognition system  **

## 2023-02-27 ENCOUNTER — APPOINTMENT (INPATIENT)
Dept: NON INVASIVE DIAGNOSTICS | Facility: HOSPITAL | Age: 68
End: 2023-02-27

## 2023-02-27 LAB
ANION GAP SERPL CALCULATED.3IONS-SCNC: 4 MMOL/L (ref 4–13)
BUN SERPL-MCNC: 17 MG/DL (ref 5–25)
CALCIUM SERPL-MCNC: 8.2 MG/DL (ref 8.3–10.1)
CHLORIDE SERPL-SCNC: 91 MMOL/L (ref 96–108)
CO2 SERPL-SCNC: 33 MMOL/L (ref 21–32)
CREAT SERPL-MCNC: 0.37 MG/DL (ref 0.6–1.3)
ERYTHROCYTE [DISTWIDTH] IN BLOOD BY AUTOMATED COUNT: 13.2 % (ref 11.6–15.1)
GFR SERPL CREATININE-BSD FRML MDRD: 128 ML/MIN/1.73SQ M
GLUCOSE SERPL-MCNC: 104 MG/DL (ref 65–140)
GLUCOSE SERPL-MCNC: 106 MG/DL (ref 65–140)
GLUCOSE SERPL-MCNC: 166 MG/DL (ref 65–140)
GLUCOSE SERPL-MCNC: 211 MG/DL (ref 65–140)
GLUCOSE SERPL-MCNC: 73 MG/DL (ref 65–140)
HCT VFR BLD AUTO: 24.1 % (ref 36.5–49.3)
HGB BLD-MCNC: 7.8 G/DL (ref 12–17)
MCH RBC QN AUTO: 32 PG (ref 26.8–34.3)
MCHC RBC AUTO-ENTMCNC: 32.4 G/DL (ref 31.4–37.4)
MCV RBC AUTO: 99 FL (ref 82–98)
PLATELET # BLD AUTO: 197 THOUSANDS/UL (ref 149–390)
PMV BLD AUTO: 9.8 FL (ref 8.9–12.7)
POTASSIUM SERPL-SCNC: 4 MMOL/L (ref 3.5–5.3)
RBC # BLD AUTO: 2.44 MILLION/UL (ref 3.88–5.62)
SODIUM SERPL-SCNC: 128 MMOL/L (ref 135–147)
WBC # BLD AUTO: 11.87 THOUSAND/UL (ref 4.31–10.16)

## 2023-02-27 RX ORDER — FUROSEMIDE 10 MG/ML
20 INJECTION INTRAMUSCULAR; INTRAVENOUS ONCE
Status: COMPLETED | OUTPATIENT
Start: 2023-02-27 | End: 2023-02-27

## 2023-02-27 RX ORDER — FUROSEMIDE 20 MG/1
20 TABLET ORAL DAILY
Status: DISCONTINUED | OUTPATIENT
Start: 2023-02-27 | End: 2023-02-27

## 2023-02-27 RX ADMIN — FORMOTEROL FUMARATE DIHYDRATE 20 MCG: 20 SOLUTION RESPIRATORY (INHALATION) at 07:13

## 2023-02-27 RX ADMIN — IPRATROPIUM BROMIDE 0.5 MG: 0.5 SOLUTION RESPIRATORY (INHALATION) at 13:27

## 2023-02-27 RX ADMIN — LEVALBUTEROL HYDROCHLORIDE 1.25 MG: 1.25 SOLUTION, CONCENTRATE RESPIRATORY (INHALATION) at 21:00

## 2023-02-27 RX ADMIN — BUDESONIDE 0.5 MG: 0.5 INHALANT ORAL at 07:13

## 2023-02-27 RX ADMIN — FUROSEMIDE 20 MG: 10 INJECTION, SOLUTION INTRAMUSCULAR; INTRAVENOUS at 12:15

## 2023-02-27 RX ADMIN — GUAIFENESIN 600 MG: 600 TABLET, EXTENDED RELEASE ORAL at 09:41

## 2023-02-27 RX ADMIN — PREDNISONE 20 MG: 20 TABLET ORAL at 09:40

## 2023-02-27 RX ADMIN — LOSARTAN POTASSIUM 12.5 MG: 25 TABLET, FILM COATED ORAL at 09:41

## 2023-02-27 RX ADMIN — TAMSULOSIN HYDROCHLORIDE 0.4 MG: 0.4 CAPSULE ORAL at 17:38

## 2023-02-27 RX ADMIN — LEVALBUTEROL HYDROCHLORIDE 1.25 MG: 1.25 SOLUTION, CONCENTRATE RESPIRATORY (INHALATION) at 07:13

## 2023-02-27 RX ADMIN — IPRATROPIUM BROMIDE 0.5 MG: 0.5 SOLUTION RESPIRATORY (INHALATION) at 21:00

## 2023-02-27 RX ADMIN — INSULIN LISPRO 1 UNITS: 100 INJECTION, SOLUTION INTRAVENOUS; SUBCUTANEOUS at 17:50

## 2023-02-27 RX ADMIN — ENOXAPARIN SODIUM 40 MG: 40 INJECTION SUBCUTANEOUS at 09:51

## 2023-02-27 RX ADMIN — BUDESONIDE 0.5 MG: 0.5 INHALANT ORAL at 21:00

## 2023-02-27 RX ADMIN — IPRATROPIUM BROMIDE 0.5 MG: 0.5 SOLUTION RESPIRATORY (INHALATION) at 07:13

## 2023-02-27 RX ADMIN — GUAIFENESIN 600 MG: 600 TABLET, EXTENDED RELEASE ORAL at 22:21

## 2023-02-27 RX ADMIN — DICLOFENAC SODIUM 2 G: 10 GEL TOPICAL at 17:38

## 2023-02-27 RX ADMIN — PRAVASTATIN SODIUM 80 MG: 20 TABLET ORAL at 17:38

## 2023-02-27 RX ADMIN — ESCITALOPRAM OXALATE 5 MG: 5 TABLET, FILM COATED ORAL at 09:43

## 2023-02-27 RX ADMIN — DICLOFENAC SODIUM 2 G: 10 GEL TOPICAL at 22:20

## 2023-02-27 RX ADMIN — SENNOSIDES AND DOCUSATE SODIUM 1 TABLET: 8.6; 5 TABLET ORAL at 22:21

## 2023-02-27 RX ADMIN — INSULIN LISPRO 1 UNITS: 100 INJECTION, SOLUTION INTRAVENOUS; SUBCUTANEOUS at 22:20

## 2023-02-27 RX ADMIN — CHLORHEXIDINE GLUCONATE 0.12% ORAL RINSE 15 ML: 1.2 LIQUID ORAL at 22:22

## 2023-02-27 RX ADMIN — LEVALBUTEROL HYDROCHLORIDE 1.25 MG: 1.25 SOLUTION, CONCENTRATE RESPIRATORY (INHALATION) at 13:27

## 2023-02-27 RX ADMIN — Medication 20 MG: at 09:40

## 2023-02-27 RX ADMIN — ASPIRIN 81 MG CHEWABLE TABLET 81 MG: 81 TABLET CHEWABLE at 09:40

## 2023-02-27 RX ADMIN — DICLOFENAC SODIUM 2 G: 10 GEL TOPICAL at 12:15

## 2023-02-27 RX ADMIN — FERROUS SULFATE TAB 325 MG (65 MG ELEMENTAL FE) 325 MG: 325 (65 FE) TAB at 09:40

## 2023-02-27 RX ADMIN — FUROSEMIDE 20 MG: 20 TABLET ORAL at 09:40

## 2023-02-27 RX ADMIN — CYANOCOBALAMIN TAB 500 MCG 1000 MCG: 500 TAB at 09:40

## 2023-02-27 RX ADMIN — FORMOTEROL FUMARATE DIHYDRATE 20 MCG: 20 SOLUTION RESPIRATORY (INHALATION) at 21:00

## 2023-02-27 RX ADMIN — DICLOFENAC SODIUM 2 G: 10 GEL TOPICAL at 09:43

## 2023-02-27 RX ADMIN — CHLORHEXIDINE GLUCONATE 0.12% ORAL RINSE 15 ML: 1.2 LIQUID ORAL at 09:40

## 2023-02-27 NOTE — PHYSICAL THERAPY NOTE
Physical Therapy Progress Note     02/27/23 1110   PT Last Visit   PT Visit Date 02/27/23   Note Type   Note Type Treatment   Pain Assessment   Pain Assessment Tool 0-10   Pain Score No Pain   Restrictions/Precautions   Other Precautions Airborne/isolation; Fall Risk;O2  (3L O2 NC )   Subjective   Subjective The patient notes that he gets dyspnic easily with activity  He is eager to get better, and he expressed frustration with his limited progress to date  Bed Mobility   Supine to Sit 5  Supervision   Additional items Increased time required   Sit to Supine 5  Supervision   Additional items Increased time required   Transfers   Sit to Stand 4  Minimal assistance   Additional items Assist x 1; Increased time required   Stand to Sit 4  Minimal assistance   Additional items Assist x 1; Increased time required   Ambulation/Elevation   Gait pattern Excessively slow; Step to;Short stride; Inconsistent jonathan;Decreased foot clearance   Gait Assistance 4  Minimal assist   Additional items Assist x 1   Assistive Device Other (Comment)  (Hand-hold assist )   Distance 5 feet x 2  Balance   Static Sitting Good   Dynamic Sitting Fair +   Static Standing Fair   Ambulatory Poor +   Activity Tolerance   Activity Tolerance Patient tolerated treatment well; Other (Comment)  (Dyspnea )   Nurse Elizabeth Campa RN  Assessment   Prognosis Good   Problem List Decreased endurance; Impaired balance;Decreased mobility   Assessment The patient continues to remain limited due to dyspnea  This becomes exacerbated with 90 seconds in stance, and he requires seated rests in order to recover  After three minutes this does improve enough to mobilize again  Full resolution requires 10 minutes  He was able to take a few steps with onset of dyspnea  He was provided a commode to utilize instead of the bed pan, but unfortunately no walker was available for his room   He demonstrates improvement in strength and balance, but he does become mildly anxious with his dyspnea  He expresses frustration with his continued situation as he is eager to get 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  The patient demonstrates the ability to regain his independence in a short amount of time pending improvement in his dyspena  Barriers to Discharge Inaccessible home environment   Goals   Patient Goals To breathe more easily  STG Expiration Date 03/01/23   PT Treatment Day 2   Plan   Treatment/Interventions Functional transfer training;LE strengthening/ROM; Therapeutic exercise; Endurance training;Patient/family training;Bed mobility;Gait training;Elevations   Progress Progressing toward goals   PT Frequency 3-5x/wk   Recommendation   PT Discharge Recommendation Post acute rehabilitation services   Equipment Recommended [facility] Recommended Wheeled walker   AM-PAC Basic Mobility Inpatient   Turning in Flat Bed Without Bedrails 4   Lying on Back to Sitting on Edge of Flat Bed Without Bedrails 3   Moving Bed to Chair 3   Standing Up From Chair Using Arms 3   Walk in Room 3   Climb 3-5 Stairs With Railing 2   Basic Mobility Inpatient Raw Score 18   Basic Mobility Standardized Score 41 05   Highest Level Of Mobility   JH-HLM Goal 6: Walk 10 steps or more   JH-HLM Achieved 6: Walk 10 steps or more         An AM-PAC Basic Mobility raw score less than 16 suggests the patient may benefit from discharge to post-acute rehab services      José Miguel Rivera, PTA

## 2023-02-27 NOTE — PROGRESS NOTES
[facility]  Progress Note - Enrique Kan  1955, 74 y o  male MRN: [id number]  Unit/Bed#: -01 Encounter: [id number]  Primary Care Provider: Cristian Wang DO   Date and time admitted to hospital: 2/12/2023  5:07 AM    * Chronic obstructive pulmonary disease ([address] )  Assessment & Plan  · Follows with Pulm as an outpatient  · Last FEV1 22%, consistent with very-severe COPD  · Home regimen:  · 3L O2 at home  · All nebs- Duo-Neb/Pulmicort/Perforomist  · Albuterol rescue inhaler  · Prednisone 5 daily  · Multiple prior exacerbations requiring admission  · Intubated 2/12, extubated 2/15  · Currently requiring 3 L supplemental O2  · Wean O2 as able  · Maintain O2 sats between 88-92%  · Continue bronchodilators - scheduled Xopenx/Atrovent TID, perforomist/pulmicort BID  · Status post Solu-medrol 40 mg TID; now continue PO taper as per Pulm recs  · Aggressive pulmonary hygiene- use of incentive spirometry, VEST/airway clearance, OOB to chair as able  · Palliative consulted, appreciate recommendations  · Goals of care discussed prior with patient with wife at bedside, patient to remain level 3 DNR/DNI       Acute on chronic respiratory failure with hypercapnia (HCC)  Assessment & Plan  · Chronically on 3 L at home  · Currently on baseline 3 L supplemental O2  · Continue with treatment plan as above      COVID-19 virus infection  Assessment & Plan  · Patient tested positive for COVID on 2/22/2023  Patient on 3L nasal cannula  · Monitor WBC and fever curve  · Continue oxygen segmentation to maintain SPO2 >88%  · Pulm re-evaluated- they recommended to continue current steroid taper, consider antiviral (s/p remdesivir x 3 days per protocol given patient's risk factors: age, chronic heart and lung disease), no need for escalated anti-inflammatory per Pulm  · CXR revealed new bibasilar opacity, L>R  · Procal negative x2  Abx discontinued per Pulm recs       Acute on chronic HFrEF (heart failure with reduced ejection fraction) ([facility])  Assessment & Plan  · NYHA class: III  / ACC/AHA stage: C; in setting of very severe COPD (FEV1 22%)  · Follows with Dr Maribell Perez as an outpatient  · EF 40% PTA on lasix 20mg qd at home  · TTE on admission showing EF 20-25% with abnormal septal motion in setting of chronic LBBB, RVSP 38, mildly dilated RV  · Clinical presentation on arrival consistent with biventricular heart failure in setting of AECOPD  · Repeated TTE on 2/21 to reassess EF showed EF 30%  · Heart Failure Team giving dose of IV Lasix today  · Goal-directed medical therapy  · Sudden cardiac death risk reduction:  · Not candidate for ICD/Life Vest candidacy due to DNR/DNI  · Of note, patient has known hx of LBBB with QRS around 120ms  · Cardiac diet, sodium restriction <2g, fluid restriction <1 5L  · Daily I&Os  · MAP goal >65   · Consulted heart failure service given newly decreased ejection fraction, appreciate recs  -Discontinued metoprolol due to wheezing  -Continue losartan 12 5 mg  -Plan to start MRA in the future  -Outpatient cardiology/heart failure follow-up    KEVIN and COPD overlap syndrome (HCC)  Assessment & Plan  · Uses BiPAP at home  · Continue BiPAP 12/6/40% qHs    Nonobstructive atherosclerosis of coronary artery  Assessment & Plan  · Coronary calcification seen on prior CT   · Follows with Dr Maribell Perez as an outpatient  · Continue with ASCVD prophylaxis with ASA and statin      Physical deconditioning  Assessment & Plan  · Evaluated by PT/OT with recommendation for postacute rehab  · Case management following, plan for [facility] [place]     Anemia  Assessment & Plan  · No overt signs of GI bleed  · Iron panel suggesting mixed iron deficiency with likely anemia of chronic disease contribution  · Retic index   74 suggesting hypoproliferation  · Elevated MCV  · Continue ferrous sulfate supplementation   · Trend CBC, transfuse if Hgb drops below 7  · Given patient has signs of numbness/tingling on the tip of the fingers, was started on vitamin B12 supplementation  · On DVT ppx   · CBC in AM    Gastroesophageal reflux disease  Assessment & Plan  · Continue PPI    Protein-calorie malnutrition ([address] )  Assessment & Plan  · Patient noted to be cachectic/ill-appearing  · 2/2 catabolic illness/COPD   · Continue Ensure supplementations with daily meals          VTE Pharmacologic Prophylaxis: VTE Score: 3 Moderate Risk (Score 3-4) - Pharmacological DVT Prophylaxis Ordered: enoxaparin (Lovenox)  Patient Centered Rounds: I performed bedside rounds with nursing staff today  Discussions with Specialists or Other Care Team Provider:  and Heart Failure AP    Education and Discussions with Family / Patient: Updated  (wife) at bedside  Total Time Spent on Date of Encounter in care of patient: 35 minutes This time was spent on one or more of the following: performing physical exam; counseling and coordination of care; obtaining or reviewing history; documenting in the medical record; reviewing/ordering tests, medications or procedures; communicating with other healthcare professionals and discussing with patient's family/caregivers  Current Length of Stay: 15 day(s)  Current Patient Status: Inpatient   Certification Statement: The patient will continue to require additional inpatient hospital stay due to IV diuresis  Discharge Plan: Anticipate discharge in 24-48 hrs to rehab facility  pending Heart Failure clearance    Code Status: Level 3 - DNAR and DNI    Subjective:   Mr Dolly Infante reports his breathing feels about the same today  Objective:     Vitals:   Temp (24hrs), Av 8 °F (36 6 °C), Min:97 7 °F (36 5 °C), Max:97 8 °F (36 6 °C)    Temp:  [97 7 °F (36 5 °C)-97 8 °F (36 6 °C)] 97 8 °F (36 6 °C)  HR:  [76-87] 83  Resp:  [16-21] 21  BP: ()/(46-63) 95/46  SpO2:  [95 %-100 %] 99 %  Body mass index is 20 67 kg/m²       Input and Output Summary (last 24 hours): Intake/Output Summary (Last 24 hours) at 2/27/2023 1212  Last data filed at 2/27/2023 1052  Gross per 24 hour   Intake 360 ml   Output 400 ml   Net -40 ml       Physical Exam:   Physical Exam  Vitals reviewed  Constitutional:       Comments: Patient seen sitting in bed, wife and OT present, chronically ill appearing   Cardiovascular:      Rate and Rhythm: Normal rate and regular rhythm  Pulmonary:      Comments: Tachypneic, course breath sounds, 3L NC O2 98%  Abdominal:      General: Bowel sounds are normal       Palpations: Abdomen is soft  Tenderness: There is no abdominal tenderness  Musculoskeletal:      Right lower leg: No edema  Left lower leg: No edema  Skin:     General: Skin is warm and dry  Neurological:      Mental Status: He is alert and oriented to person, place, and time  Psychiatric:         Mood and Affect: Mood normal          Behavior: Behavior normal          Additional Data:     Labs:  Results from last 7 days   Lab Units 02/27/23  0434 02/25/23  0518 02/24/23  0904   WBC Thousand/uL 11 87*   < > 8 41   HEMOGLOBIN g/dL 7 8*   < > 8 7*   HEMATOCRIT % 24 1*   < > 26 2*   PLATELETS Thousands/uL 197   < > 237   NEUTROS PCT %  --   --  78*   LYMPHS PCT %  --   --  7*   MONOS PCT %  --   --  14*   EOS PCT %  --   --  0    < > = values in this interval not displayed       Results from last 7 days   Lab Units 02/27/23  0434   SODIUM mmol/L 128*   POTASSIUM mmol/L 4 0   CHLORIDE mmol/L 91*   CO2 mmol/L 33*   BUN mg/dL 17   CREATININE mg/dL 0 37*   ANION GAP mmol/L 4   CALCIUM mg/dL 8 2*   GLUCOSE RANDOM mg/dL 73         Results from last 7 days   Lab Units 02/27/23  0607 02/26/23  1734 02/26/23  1210 02/26/23  0608 02/25/23  2223 02/25/23  1720 02/25/23  1236 02/25/23  0515 02/24/23  2050 02/24/23  1715 02/24/23  1240 02/24/23  0556   POC GLUCOSE mg/dl 106 284* 247* 99 195* 195* 211* 85 212* 195* 116 90         Results from last 7 days   Lab Units 02/25/23  0518 02/24/23  6860 PROCALCITONIN ng/ml 0 05 0 05       Lines/Drains:  Invasive Devices     Peripheral Intravenous Line  Duration           Peripheral IV 02/25/23 Left;Ventral (anterior) Forearm 1 day                      Imaging: No pertinent imaging reviewed      Recent Cultures (last 7 days):         Last 24 Hours Medication List:   Current Facility-Administered Medications   Medication Dose Route Frequency Provider Last Rate   • acetaminophen  975 mg Oral Q8H PRN Sushant Hilliard, DO     • albuterol  2 puff Inhalation Q4H PRN Sushant Hilliard, DO     • aspirin  81 mg Oral Daily Kamron Ye, DO     • budesonide  0 5 mg Nebulization Q12H Elizabeth Maria, DO     • chlorhexidine  15 mL Mouth/Throat Q12H [address], DO     • vitamin B-12  1,000 mcg Oral Daily Vargas Moser MD     • Diclofenac Sodium  2 g Topical 4x Daily Elizabeth Maria, DO     • enoxaparin  40 mg Subcutaneous Daily Elizabeth Maria, DO     • ergocalciferol  50,000 Units Oral Q28 Days Elizabeth Maria, DO     • escitalopram  5 mg Oral Daily Elizabeth Maria, DO     • ferrous sulfate  325 mg Oral Every Other Day Elizabeth Maria, DO     • formoterol  20 mcg Nebulization Q12H Elizabeth Maria, DO     • furosemide  20 mg Intravenous Once [employer], CRNP     • guaiFENesin  600 mg Oral Q12H [address] Vargas Moser MD     • insulin lispro  1-5 Units Subcutaneous TID AC Lillian Spencer, DO     • insulin lispro  1-5 Units Subcutaneous HS Lillian Spencer, DO     • levalbuterol  1 25 mg Nebulization TID Elizabeth Maria DO      And   • ipratropium  0 5 mg Nebulization TID Elizabeth Maria, DO     • losartan  12 5 mg Oral Daily Alonso Mccormack PA-C     • omeprazole (PRILOSEC) suspension 2 mg/mL  20 mg Oral Daily Kamron Ye, DO     • polyethylene glycol  17 g Oral Daily PRN Elizabeth Maria DO     • pravastatin  80 mg Oral Daily With Ramakrishna Zuleta DO     • predniSONE  20 mg Oral Daily Serjohn paul Andrade, DO      Followed by   • [START ON 3/2/2023] predniSONE  10 mg Oral Daily Di Yuan DO     • [START ON 3/5/2023] predniSONE  5 mg Oral Daily Di Bolden, DO     • senna-docusate sodium  1 tablet Oral HS Mena Hughes,      • sodium chloride  2 spray Each Nare Q1H PRN Shobha Spencer, DO     • tamsulosin  0 4 mg Oral Daily With [address], DO          Today, Patient Was Seen By: Wilfredo Norman PA-C    **Please Note: This note may have been constructed using a voice recognition system  **

## 2023-02-27 NOTE — PROGRESS NOTES
PULMONOLOGY PROGRESS NOTE     Name: Mikhail Pompa  Age & Sex: 74 y o  male   MRN: [id number]  Unit/Bed#: -01   Encounter: [id number]    PATIENT INFORMATION     Name: Mikhail Pompa  Age & Sex: 74 y o  male   MRN: [id number]  Hospital Stay Days: 15    ASSESSMENT/PLAN     1  Acute on chronic respiratory failure with hypoxia and hypercapnia  - Improving  - Chronic 3L at baseline, currently on 3-4  - Secondary to COPD and CHF  - Intubated on admission 2/12/23 and extubated 2/15  - Does not have PAP therapy at home    2  End-stage COPD with acute exacerbation  - Frequent exacerbations  - FEV1 22%  - On all nebulized regimen: Perforomist, budesonide, atrovent, albuterol both here and at home    3  CAP   - Resolved  - Completed course of CTX and azithro    4  HFrEF with acute decompensation  - NICM, nonobstructive CAD  - Improved with diuresis  - EF 20-25% on echo 2/12/23 with G1DD and global hypokinesis, RV dilation, mildly increased est PASP  - Repeat echo 2/21/23 shows dilated LV, EF 30%, global hypokinesis H1OC, RV systolic function normal    5  Positive SARS-CoV-2 test  - 2/22/23  - Does not appear to be contributing to hypoxia  - Vaccinated with Pfizer x3 with last dose 6/15/21    5  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    6  KEVIN  - Reported, no sleep study available  - Utilizing BiPAP while inpatient QHS    7  Pulmonary nodules  - stable 5mm RLL nodule and 3mm RUL nodule    8  Former smoker    Plan:  - Should attempt to qualify him for home BiPAP on the basis of COPD with chronic hypercapnic respiratory failure and night time hypoxia  Please order a morning ABG as well as an overnight pulse oximetry study once he appears to be within 24-48 hours of discharge  - Continue to mild COVID treatment pathway  - Continue nebulizer regimen here and on discharge  - Continue prednisone taper  Down to 20mg daily today x3 days, followed by 10mg daily for 3 days   When taper is completed, resume chronic 5mg prednisone daily  - Diuresis with heart failure service input  - Monitor glucose levels and adjust insulin regimen as necessary  - Titrate supplemental O2 to maintain sats > 88%      SUBJECTIVE     Patient seen and examined  No acute events overnight  He continues to experience significant dyspnea when he exerts himself, but feels better at rest  Denies any significant chest pain or palpitations  Cough is nonproductive  ROS otherwise normal      OBJECTIVE     Vitals:    23 1436 23 1456 23 1956 23 2231   BP:  96/63  100/60   BP Location:  Right arm     Pulse:  83  87   Resp:  16     Temp:  97 7 °F (36 5 °C)  97 8 °F (36 6 °C)   TempSrc:  Oral     SpO2: 95% 97% 97% 98%   Weight:       Height:          Temperature:   Temp (24hrs), Av 7 °F (36 5 °C), Min:97 6 °F (36 4 °C), Max:97 8 °F (36 6 °C)    Temperature: 97 8 °F (36 6 °C)  Intake & Output:  I/O        0701   0700  0701   0700    P  O  1242 354    Total Intake(mL/kg) 1242 (26 4) 354 (7 5)    Urine (mL/kg/hr) 675 (0 6) 350 (0 3)    Total Output 675 350    Net +567 +4              Weights:   IBW (Ideal Body Weight): 50 kg    Body mass index is 20 24 kg/m²  Weight (last 2 days)     Date/Time Weight    23 0552 47 (103 62)        Physical Exam  Constitutional:       Appearance: He is ill-appearing  He is not toxic-appearing  HENT:      Head: Normocephalic and atraumatic  Eyes:      General: No scleral icterus  Conjunctiva/sclera: Conjunctivae normal    Cardiovascular:      Rate and Rhythm: Normal rate and regular rhythm  Pulses: Normal pulses  Heart sounds: Normal heart sounds  Pulmonary:      Breath sounds: Wheezing present  No rhonchi or rales  Abdominal:      Palpations: Abdomen is soft  Tenderness: There is no abdominal tenderness  There is no guarding or rebound  Musculoskeletal:         General: No swelling  Right lower leg: No edema  Left lower leg: No edema  Skin:     General: Skin is warm and dry  Neurological:      General: No focal deficit present  Mental Status: He is alert and oriented to person, place, and time  LABORATORY DATA     Labs: I have personally reviewed pertinent reports  Results from last 7 days   Lab Units 02/26/23  0540 02/25/23  0518 02/24/23  0904 02/23/23  1154   WBC Thousand/uL 7 96 7 14 8 41 10 13   HEMOGLOBIN g/dL 7 8* 8 0* 8 7* 9 9*   HEMATOCRIT % 24 9* 24 3* 26 2* 31 7*   PLATELETS Thousands/uL 201 213 237 281   NEUTROS PCT %  --   --  78* 79*   MONOS PCT %  --   --  14* 14*      Results from last 7 days   Lab Units 02/26/23  0540 02/25/23  0518 02/24/23  0600   POTASSIUM mmol/L 4 1 3 2* 3 8   CHLORIDE mmol/L 94* 91* 93*   CO2 mmol/L 32 32 33*   BUN mg/dL 14 14 13   CREATININE mg/dL 0 36* 0 40* 0 32*   CALCIUM mg/dL 8 3 8 4 8 4           IMAGING & DIAGNOSTIC TESTING     Radiology Results: I have personally reviewed pertinent reports  XR chest portable    Result Date: 2/13/2023  Impression: 1  Persistent high positioning of the endotracheal tube  Advancement by at least 4 cm advised  2   Tip of right internal jugular central venous catheter projects over the lower SVC  No pneumothorax  3   Persistent pulmonary vascular congestion  The study was marked in [facility] for immediate notification  Workstation performed: [id number]     XR chest 1 view portable    Result Date: 2/12/2023  Impression: Moderate pulmonary venous congestion  ET tube 6 cm above the ghada  Workstation performed: [id number]     CT head wo contrast    Result Date: 2/12/2023  Impression: No acute intracranial abnormality  Workstation performed: [id number]     XR chest portable ICU    Result Date: 2/15/2023  Impression: Endotracheal tube projects 2-3 cm above the ghada  Stable moderate pulmonary vascular congestion  Workstation performed: [id number]     XR chest portable ICU    Result Date: 2/13/2023  Impression: Tubes and lines as above without pneumothorax  Recommend advancement of nasogastric tube  No acute cardiopulmonary disease  The study was marked in [facility] for immediate notification  Workstation performed: [id number]     Other Diagnostic Testing: I have personally reviewed pertinent reports      ACTIVE MEDICATIONS     Current Facility-Administered Medications   Medication Dose Route Frequency   • acetaminophen (TYLENOL) tablet 975 mg  975 mg Oral Q8H PRN   • albuterol (PROVENTIL HFA,VENTOLIN HFA) inhaler 2 puff  2 puff Inhalation Q4H PRN   • aspirin chewable tablet 81 mg  81 mg Oral Daily   • budesonide (PULMICORT) inhalation solution 0 5 mg  0 5 mg Nebulization Q12H   • chlorhexidine (PERIDEX) 0 12 % oral rinse 15 mL  15 mL Mouth/Throat Q12H GABE   • cyanocobalamin (VITAMIN B-12) tablet 1,000 mcg  1,000 mcg Oral Daily   • Diclofenac Sodium (VOLTAREN) 1 % topical gel 2 g  2 g Topical 4x Daily   • enoxaparin (LOVENOX) subcutaneous injection 40 mg  40 mg Subcutaneous Daily   • ergocalciferol (VITAMIN D2) capsule 50,000 Units  50,000 Units Oral Q28 Days   • escitalopram (LEXAPRO) tablet 5 mg  5 mg Oral Daily   • ferrous sulfate tablet 325 mg  325 mg Oral Every Other Day   • formoterol (PERFOROMIST) nebulizer solution 20 mcg  20 mcg Nebulization Q12H   • furosemide (LASIX) tablet 20 mg  20 mg Oral Daily   • guaiFENesin (MUCINEX) 12 hr tablet 600 mg  600 mg Oral Q12H GABE   • insulin lispro (HumaLOG) 100 units/mL subcutaneous injection 1-5 Units  1-5 Units Subcutaneous TID AC   • insulin lispro (HumaLOG) 100 units/mL subcutaneous injection 1-5 Units  1-5 Units Subcutaneous HS   • levalbuterol (XOPENEX) inhalation solution 1 25 mg  1 25 mg Nebulization TID    And   • ipratropium (ATROVENT) 0 02 % inhalation solution 0 5 mg  0 5 mg Nebulization TID   • losartan (COZAAR) tablet 12 5 mg  12 5 mg Oral Daily   • omeprazole (PRILOSEC) suspension 2 mg/mL  20 mg Oral Daily   • polyethylene glycol (MIRALAX) packet 17 g  17 g Oral Daily PRN   • pravastatin (PRAVACHOL) tablet 80 mg 80 mg Oral Daily With Dinner   • predniSONE tablet 20 mg  20 mg Oral Daily    Followed by   • [START ON 3/2/2023] predniSONE tablet 10 mg  10 mg Oral Daily   • [START ON 3/5/2023] predniSONE tablet 5 mg  5 mg Oral Daily   • senna-docusate sodium (SENOKOT S) 8 6-50 mg per tablet 1 tablet  1 tablet Oral HS   • sodium chloride (OCEAN) 0 65 % nasal spray 2 spray  2 spray Each Nare Q1H PRN   • tamsulosin (FLOMAX) capsule 0 4 mg  0 4 mg Oral Daily With Dinner       VTE Pharmacologic Prophylaxis: Enoxaparin (Lovenox)  VTE Mechanical Prophylaxis: sequential compression device      Disclaimer: Portions of the record may have been created with voice recognition software  Occasional wrong word or "sound a like" substitutions may have occurred due to the inherent limitations of voice recognition software  Careful consideration should be taken to recognize, using context, where substitutions have occurred      Antony Bose DO  Pulmonary/Critical Care Fellowship PGY-V  [facility]

## 2023-02-27 NOTE — PLAN OF CARE
Problem: PHYSICAL THERAPY ADULT  Goal: Performs mobility at highest level of function for planned discharge setting  See evaluation for individualized goals  Description: Treatment/Interventions: Functional transfer training, LE strengthening/ROM, Elevations, Therapeutic exercise, Endurance training, Equipment eval/education, Bed mobility, Gait training, Spoke to nursing, Family, Patient/family training  Equipment Recommended: [address] (at this time)       See flowsheet documentation for full assessment, interventions and recommendations  Outcome: Progressing  Note: Prognosis: Good  Problem List: Decreased endurance, Impaired balance, Decreased mobility  Assessment: The patient continues to remain limited due to dyspnea  This becomes exacerbated with 90 seconds in stance, and he requires seated rests in order to recover  After three minutes this does improve enough to mobilize again  Full resolution requires 10 minutes  He was able to take a few steps with onset of dyspnea  He was provided a commode to utilize instead of the bed pan, but unfortunately no walker was available for his room  He demonstrates improvement in strength and balance, but he does become mildly anxious with his dyspnea  He expresses frustration with his continued situation as he is eager to get 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

## 2023-02-27 NOTE — CASE MANAGEMENT
Case Management Progress Note    Patient name Tori Favorite  Location /-01 MRN [id number]  : 1955 Date 2023       LOS (days): 15  Geometric Mean LOS (GMLOS) (days): 5 00  Days to GMLOS:-10 4        OBJECTIVE:        Current admission status: Inpatient  Preferred Pharmacy:   [facility]/pharmacy #[id number]  [facility] [address]  Phone: [phone] Fax: [phone]    Primary Care Provider: Nani Serrano DO    Primary Insurance: BLUE CROSS  Secondary Insurance: MEDICARE    PROGRESS NOTE:    Per rounding, cardiology requesting to keep patient one more day due to need for IV LASIX  Uyen Wade provided with updated PT/OT notes for authorization Uyen Wade requesting that at D/C patient goes with his face mask and tubing from the BIPAP machine  Sticky note left, CM to follow-up at D/C  CM to follow for expected D/C tomorrow      BRADFORD Mann, ACSW, ACM, [facility]  23 4:12 PM

## 2023-02-27 NOTE — OCCUPATIONAL THERAPY NOTE
Occupational Therapy Treatment Note      Jared Browning     2/27/2023    Principal Problem:    Chronic obstructive pulmonary disease (HCC)  Active Problems:    Nonobstructive atherosclerosis of coronary artery    KEVIN and COPD overlap syndrome (HCC)    Gastroesophageal reflux disease    Acute on chronic respiratory failure with hypercapnia (HCC)    Protein-calorie malnutrition (HCC)    Acute on chronic HFrEF (heart failure with reduced ejection fraction) (HCC)    Anemia    Physical deconditioning    COVID-19 virus infection      Past Medical History:   Diagnosis Date    Acute metabolic encephalopathy 9/65/1613    Arthritis     Bladder mass     Cardiomyopathy (HCC)     Chest pain     COPD (chronic obstructive pulmonary disease) (HCC)     CPAP (continuous positive airway pressure) dependence     Emphysema of lung (HCC)     Hypoxia     nocturnal    Left bundle branch block     Multiple pulmonary nodules     last assessed: 10/12/16    Pneumonia     Sleep apnea     Sleep apnea, obstructive     Smoker     Weight loss 8/29/2019       Past Surgical History:   Procedure Laterality Date    COLONOSCOPY      CYSTOSCOPY      CYSTOSCOPY  02/17/2021    SC BLADDER INSTILLATION ANTICARCINOGENIC AGENT N/A 1/3/2020    Procedure: INSTILLATION MITOMYCIN;  Surgeon: Herson Bledsoe MD;  [facility];  Service: Urology    SC CYSTO W/REMOVAL OF LESIONS SMALL N/A 1/3/2020    Procedure: TRANSURETHRAL RESECTION OF BLADDER TUMOR (TURBT);   Surgeon: Herson Bledsoe MD;  [facility];  Service: Urology    SC CYSTOURETHROSCOPY WITH BIOPSY N/A 4/13/2021    Procedure: Franky Lama;  Surgeon: Herson Bledsoe MD;  [facility];  Service: Urology    SC TRURL ELECTROSURG [address] [place] [address] N/A 4/13/2021    Procedure: TRANSURETHRAL RESECTION OF PROSTATE (TURP) BLADDER BIOPSY, FULGURATION;  Surgeon: Herson Bledsoe MD;  [facility];  Service: Urology       02/27/23 1150   OT Last Visit   OT Visit Date 02/27/23   Note Type   Note Type Treatment for Insurance Auth   Pain Assessment   Pain Assessment Tool 0-10   Pain Score No Pain   Restrictions/Precautions   Weight Bearing Precautions Per Order No   Braces or Orthoses   (denies)   Other Precautions Droplet precautions; Airborne/isolation;Contact/isolation   Lifestyle   Autonomy Pt reports being I in ADLS, IADLs, and does not use DME at baseline  (+)   Reciprocal Relationships Pt lives with his wife and son who can A if needed  Service to Others Pt is retired  Intrinsic Gratification Pt enjoys putting together tiny models  ADL   Where Assessed Edge of bed   Eating Assistance 6  Modified independent   Eating Deficit Increased time to complete   Grooming Assistance 5  Supervision/Setup   Grooming Deficit Increased time to complete;Supervision/safety;Setup   UB Bathing Assistance 4  Minimal Assistance   UB Bathing Deficit Increased time to complete;Supervision/safety;Verbal cueing;Steadying;Setup   LB Bathing Assistance 3  Moderate Assistance   LB Bathing Deficit Increased time to complete;Supervision/safety;Verbal cueing;Steadying;Setup   UB Dressing Assistance 4  Minimal Assistance   UB Dressing Deficit Increased time to complete;Supervision/safety;Verbal cueing;Steadying;Setup   LB Dressing Assistance 3  Moderate Assistance   LB Dressing Deficit Supervision/safety; Increased time to complete;Verbal cueing;Steadying;Setup   Toileting Assistance  4  Minimal Assistance   Toileting Deficit Supervison/safety; Increased time to complete;Verbal cueing;Steadying;Setup   Bed Mobility   Rolling R 5  Supervision   Additional items Assist x 1; Increased time required; Bedrails   Rolling L 5  Supervision   Additional items Assist x 1; Increased time required; Bedrails   Supine to Sit 5  Supervision   Additional items Assist x 1; Increased time required; Bedrails   Sit to Supine 5  Supervision   Additional items Assist x 1; Increased time required; Bedrails Transfers   Sit to Stand 4  Minimal assistance   Additional items Assist x 1; Increased time required;Verbal cues; Bedrails   Stand to Sit 4  Minimal assistance   Additional items Assist x 1; Increased time required;Verbal cues; Bedrails   Functional Mobility   Functional Mobility 4  Minimal assistance   Additional items Hand hold assistance   Cognition   Overall Cognitive Status WFL   Arousal/Participation Alert; Cooperative   Attention Attends with cues to redirect   Orientation Level Oriented X4   Memory Decreased recall of precautions;Decreased recall of recent events   Following Commands Follows one step commands with increased time or repetition   Comments Pt was agreeable to OT session  Activity Tolerance   Activity Tolerance Patient limited by fatigue   Medical Staff Made Aware Yes spoke with pt's RN who stated pt was appropriate for OT and made aware of outcomes  Assessment   Assessment Pt participated in skilled OT session today addressing the following interventions ADL retraining with proper body mechanics, Patient / Family Education, Transfer Training, Safety Awareness, Fall Prevention, Activity tolerance training, Standing tolerance training and Sitting/ standing balance task to increase EOB/ OOB olivia performance tolerance  Upon arrival, OT completed 2 pt identifiers  Pt agreeable to OT treatment session, upon arrival patient was found alert, responsive and in no apparent distress  Pt completed bed mobility with CGA/min A  Pt completed sit to stand with min A/CGA  Pt completed ADLS at bedside  Pt demonstrated ability to/was able to complete UB ADLS with min A and LB ADLS with mod A  Pt completed side stepping functional mobility with min A and handheld A  Pt seemed to have some level of learned helplessness and required constant encouragement to participate   Pt requiring VCs and A throughout and edu in energy conservation, work simplification, pursed lipped breathing, safety and increasing independence with ADL performance  Pt continues to be functioning below baseline level, occupational performance remains limited secondary to factors listed above and increased risk for falls and injury  From OT standpoint, recommendation at time of d/c would be post acute rehab  Pt to benefit from continued Occupational Therapy treatment while in the hospital to address deficits as defined above and maximize level of functional independence with ADLs and functional mobility  Plan   Treatment Interventions ADL retraining;Functional transfer training; Endurance training;Patient/family training; Compensatory technique education;Continued evaluation;Cardiac education; Energy conservation; Activityengagement   Goal Expiration Date 03/06/23   OT Treatment Day 2   OT Frequency 3-5x/wk   Recommendation   OT Discharge Recommendation Post acute rehabilitation services   AM-PAC Daily Activity Inpatient   Lower Body Dressing 2   Bathing 2   Toileting 2   Upper Body Dressing 3   Grooming 4   Eating 4   Daily Activity Raw Score 17   Daily Activity Standardized Score (Calc for Raw Score >=11) 37 26   AM-PAC Applied Cognition Inpatient   Following a Speech/Presentation 4   Understanding Ordinary Conversation 4   Taking Medications 4   Remembering Where Things Are Placed or Put Away 4   Remembering List of 4-5 Errands 3   Taking Care of Complicated Tasks 3   Applied Cognition Raw Score 22   Applied Cognition Standardized Score 47 83     Lesia Pena MS OTR/L

## 2023-02-27 NOTE — CASE MANAGEMENT
Case Management Progress Note    Patient name Sana Mccrary  Location /-01 MRN [id number]  : 1955 Date 2023       LOS (days): 15  Geometric Mean LOS (GMLOS) (days): 5 00  Days to GMLOS:-10 2        OBJECTIVE:        Current admission status: Inpatient  Preferred Pharmacy:   [facility]/pharmacy #[id number]  [facility] [address]  Phone: [phone] Fax: [phone]    Primary Care Provider: Javon Mcneil DO    Primary Insurance: BLUE CROSS  Secondary Insurance: MEDICARE    PROGRESS NOTE:    Per message from Delaney Moralez with [facility], insurance reviewer requesting updated PT/OT notes for patient  Delaney Moralez stating she is able to take patient today pending those updates  PT/OT made aware of need for updates via TT  SLIM made aware via TT  CM to follow for possible D/C to [facility] today      Lucienne Gitelman, BR[place], ACSW, ACM, CCM  23 11:01 AM

## 2023-02-27 NOTE — PROGRESS NOTES
Heart Failure/ Pulmonary Hypertension Progress Note - Paris Jhaveri  74 y o  male MRN: [id number]    Unit/Bed#: -01 Encounter: [id number]      Assessment:    Principal Problem:    Chronic obstructive pulmonary disease (HCC)  Active Problems:    Nonobstructive atherosclerosis of coronary artery    KEVIN and COPD overlap syndrome (HCC)    Gastroesophageal reflux disease    Acute on chronic respiratory failure with hypercapnia (HCC)    Protein-calorie malnutrition (HCC)    Acute on chronic HFrEF (heart failure with reduced ejection fraction) (HCC)    Anemia    Physical deconditioning    COVID-19 virus infection      Subjective:   Patient seen and examined  No significant events overnight  No diuretic x 2 days due to hold parameters    Objective: Intake/ Output: 354/550/-196  Weight: bed scale  Tele: NA    Plan:  Acute on Chronic HFrEF; LVEF 20-25%; NYHA III; ACC/AHA Stage C  Etiology: Nonischemic cardiomyopathy   Possible dyssynchrony from chronic LBBB   Despite QRS only being 120 ms, echo shows significant dyssynchrony  Recent drop in EF likely related to stress myopathy with acute exacerbation of COPD requiring intubation  Tachypneic on exam today with elevated JVP  Will give one time dose of Lasix 20 mg IV today  Assess response in AM       TTE 2/21/23: LVEF 30%  LVIDd 6 6cm  severe global hypokinesis  Grade I DD  RV cavity size and systolic function normal  Mild TR  TTE 2/12/2023: LVEF 20-25%   Severe global hypokinesis with regional variation   Grade 1 DD   Abnormal septal motion consistent with LBBB   RV cavity mildly dilated, normal systolic function   Mild MR, mild TR   RVSP 38   Dilated IVC    TTE 8/26/2022: LVEF 40%   RV cavity mildly dilated   Systolic function normal   Mild MR, TR   Normal IVC      Neurohormonal Blockade:  --Beta Blocker: none 2/2 c/f bronchoconstriction  --ARNi / ACEi / ARB: losartan 12 5 mg QD  --Aldosterone Antagonist: future maybe   --SGLT2 Inhibitor: no  --Home Diuretic: Lasix PRN  --Inpatient Diuretic: Lasix 20 mg QD, IV PRN     Sudden Cardiac Death Risk Reduction:  --ICD: LVEF newly reduced to 20-25%     Electrical Resynchronization:  --Candidacy for BiV device: QRSd 120ms, chronic LBBB with dyssynchrony on echocardiogram      Advanced Therapies (if appropriate): Not appropriate at this time, particularly considering advanced lung disease  Chronic hyponatremia  COVID-19  Tested positive on 2/22/23  Nonobstructive CAD  Has coronary calcium on CT  On aspirin and statin  Hyperlipidemia  Continue statin  LDL 75 8/2021  COPD (end stage)  Former smoker; 105 pack years, quit in 2012  Severe disease, follows closely with pulm   On home oxygen, 3-4L  Hospitalized with acute COPD exacerbation requiring intubation, now extubated  Management per pulm  KEVIN   On BiPAP nightly      Review of Systems   All other systems reviewed and are negative  [employer] (day, reason): Stanley catheter (day, reason):    Vitals: Blood pressure 93/53, pulse 76, temperature 97 8 °F (36 6 °C), resp  rate 21, height 5' (1 524 m), weight 48 kg (105 lb 13 1 oz), SpO2 98 %  , Body mass index is 20 67 kg/m² , I/O last 3 completed shifts: In: 148 [P O :834]  Out: 1125 [Urine:1125]  No intake/output data recorded  Wt Readings from Last 3 Encounters:   02/27/23 48 kg (105 lb 13 1 oz)   01/17/23 48 1 kg (106 lb)   12/19/22 44 3 kg (97 lb 9 6 oz)       Intake/Output Summary (Last 24 hours) at 2/27/2023 0903  Last data filed at 2/27/2023 0436  Gross per 24 hour   Intake --   Output 400 ml   Net -400 ml     I/O last 3 completed shifts: In: 550 [P O :834]  Out: 1780 [Urine:1125]          Physical Exam:  Vitals:    02/27/23 0325 02/27/23 0545 02/27/23 0713 02/27/23 0737   BP:   93/53    BP Location:       Pulse:   76    Resp:   21    Temp:       TempSrc:       SpO2: 98%  100% 98%   Weight:  48 kg (105 lb 13 1 oz)     Height:           GEN: Miguel Donis  is ill appearing, tachypneic     HEENT: pupils equal, round, and reactive to light; extraocular muscles intact  NECK: supple, no carotid bruits   HEART: regular rhythm, normal S1 and S2, no murmurs, clicks, gallops or rubs, JVP is up  LUNGS: clear to auscultation bilaterally; no wheezes, rales, or rhonchi   ABDOMEN: normal bowel sounds, soft, no tenderness, no distention  EXTREMITIES: peripheral pulses normal; no clubbing, cyanosis, or edema  NEURO: no focal findings   SKIN: normal without suspicious lesions on exposed skin      Current Facility-Administered Medications:   •  acetaminophen (TYLENOL) tablet 975 mg, 975 mg, Oral, Q8H PRN, Sushant Banai, DO, 975 mg at 02/22/23 0303  •  albuterol (PROVENTIL HFA,VENTOLIN HFA) inhaler 2 puff, 2 puff, Inhalation, Q4H PRN, Sushant Banai, DO, 2 puff at 02/23/23 1711  •  aspirin chewable tablet 81 mg, 81 mg, Oral, Daily, Kamron P Ye, DO, 81 mg at 02/26/23 7224  •  budesonide (PULMICORT) inhalation solution 0 5 mg, 0 5 mg, Nebulization, Q12H, Kamron P Ye, DO, 0 5 mg at 02/27/23 9278  •  chlorhexidine (PERIDEX) 0 12 % oral rinse 15 mL, 15 mL, Mouth/Throat, Q12H [address], Kamron P Ye, DO, 15 mL at 02/26/23 2216  •  cyanocobalamin (VITAMIN B-12) tablet 1,000 mcg, 1,000 mcg, Oral, Daily, Debby Moser MD, 7,976 mcg at 02/26/23 3807  •  Diclofenac Sodium (VOLTAREN) 1 % topical gel 2 g, 2 g, Topical, 4x Daily, Kamron P Ye, DO, 2 g at 02/26/23 2218  •  enoxaparin (LOVENOX) subcutaneous injection 40 mg, 40 mg, Subcutaneous, Daily, Kamron P Ye, DO, 40 mg at 02/26/23 7962  •  ergocalciferol (VITAMIN D2) capsule 50,000 Units, 50,000 Units, Oral, Q28 Days, Kamron P Ye, DO  •  escitalopram (LEXAPRO) tablet 5 mg, 5 mg, Oral, Daily, Kamron Ye DO, 5 mg at 02/26/23 1831  •  ferrous sulfate tablet 325 mg, 325 mg, Oral, Every Other Day, Alice Dumont DO, 325 mg at 02/25/23 6843  •  formoterol (PERFOROMIST) nebulizer solution 20 mcg, 20 mcg, Nebulization, Q12H, Kamron Ye DO, 20 mcg at 02/27/23 6656  •  furosemide (LASIX) tablet 20 mg, 20 mg, Oral, Daily, Kamronjulienne Ye DO, 20 mg at 02/24/23 1020  •  guaiFENesin (MUCINEX) 12 hr tablet 600 mg, 600 mg, Oral, Q12H [address], Inder Natalia Moser MD, 401 mg at 02/26/23 2216  •  insulin lispro (HumaLOG) 100 units/mL subcutaneous injection 1-5 Units, 1-5 Units, Subcutaneous, TID AC, 3 Units at 02/26/23 1735 **AND** Fingerstick Glucose (POCT), , , TID AC, Gideon Spencer, DO  •  insulin lispro (HumaLOG) 100 units/mL subcutaneous injection 1-5 Units, 1-5 Units, Subcutaneous, HS, Gideon Spencer DO, 2 Units at 02/26/23 2217  •  levalbuterol (XOPENEX) inhalation solution 1 25 mg, 1 25 mg, Nebulization, TID, 1 25 mg at 02/27/23 0713 **AND** ipratropium (ATROVENT) 0 02 % inhalation solution 0 5 mg, 0 5 mg, Nebulization, TID, 0 5 mg at 02/27/23 5180 **AND** [DISCONTINUED] sodium chloride 0 9 % inhalation solution 3 mL, 3 mL, Nebulization, 4x Daily, Macie Bustillo DO  •  losartan (COZAAR) tablet 12 5 mg, 12 5 mg, Oral, Daily, Yohan Maki PA-C, 12 5 mg at 02/26/23 3002  •  omeprazole (PRILOSEC) suspension 2 mg/mL, 20 mg, Oral, Daily, Kamron Ye DO, 20 mg at 02/26/23 0416  •  polyethylene glycol (MIRALAX) packet 17 g, 17 g, Oral, Daily PRN, Addis hCeng DO, 17 g at 02/23/23 1126  •  pravastatin (PRAVACHOL) tablet 80 mg, 80 mg, Oral, Daily With Dinner, Addis Cheng DO, 80 mg at 02/26/23 1730  •  [COMPLETED] predniSONE tablet 40 mg, 40 mg, Oral, Daily, 40 mg at 02/23/23 1125 **FOLLOWED BY** [COMPLETED] predniSONE tablet 30 mg, 30 mg, Oral, Daily, 30 mg at 02/26/23 0942 **FOLLOWED BY** predniSONE tablet 20 mg, 20 mg, Oral, Daily **FOLLOWED BY** [START ON 3/2/2023] predniSONE tablet 10 mg, 10 mg, Oral, Daily, Macie Bustillo DO  •  [START ON 3/5/2023] predniSONE tablet 5 mg, 5 mg, Oral, Daily, Macie Bustillo DO  •  senna-docusate sodium (SENOKOT S) 8 6-50 mg per tablet 1 tablet, 1 tablet, Oral, HS, Kamron Ye DO, 1 tablet at 02/26/23 2216  •  sodium chloride (OCEAN) 0 65 % nasal spray 2 spray, 2 spray, Each Nare, Q1H PRN, Torsten Spencer, DO  •  tamsulosin (FLOMAX) capsule 0 4 mg, 0 4 mg, Oral, Daily With Amarillo Axon, DO, 0 4 mg at 02/26/23 1730      Labs & Results:        Results from last 7 days   Lab Units 02/27/23  0434 02/26/23  0540 02/25/23  0518   WBC Thousand/uL 11 87* 7 96 7 14   HEMOGLOBIN g/dL 7 8* 7 8* 8 0*   HEMATOCRIT % 24 1* 24 9* 24 3*   PLATELETS Thousands/uL 197 201 213         Results from last 7 days   Lab Units 02/27/23  0434 02/26/23  0540 02/25/23  0518   POTASSIUM mmol/L 4 0 4 1 3 2*   CHLORIDE mmol/L 91* 94* 91*   CO2 mmol/L 33* 32 32   BUN mg/dL 17 14 14   CREATININE mg/dL 0 37* 0 36* 0 40*   CALCIUM mg/dL 8 2* 8 3 8 4           Counseling / Coordination of Care  Total floor / unit time spent today 40 minutes  Greater than 50% of total time was spent with the patient and / or family counseling and / or coordination of care  A description of the counseling / coordination of care: 20  Thank you for the opportunity to participate in the care of this patient  Bren Meraz

## 2023-02-27 NOTE — PLAN OF CARE
Problem: OCCUPATIONAL THERAPY ADULT  Goal: Performs self-care activities at highest level of function for planned discharge setting  See evaluation for individualized goals  Description: Treatment Interventions: ADL retraining, UE strengthening/ROM, Endurance training, Patient/family training, Equipment evaluation/education, Compensatory technique education, Continued evaluation, Cardiac education, Activityengagement          See flowsheet documentation for full assessment, interventions and recommendations  Note: Limitation: Decreased ADL status, Decreased UE ROM, Decreased UE strength, Decreased cognition, Decreased self-care trans, Decreased high-level ADLs  Prognosis: Fair  Assessment: Pt participated in skilled OT session today addressing the following interventions ADL retraining with proper body mechanics, Patient / Family Education, Transfer Training, Safety Awareness, Fall Prevention, Activity tolerance training, Standing tolerance training and Sitting/ standing balance task to increase EOB/ OOB olivia performance tolerance  Upon arrival, OT completed 2 pt identifiers  Pt agreeable to OT treatment session, upon arrival patient was found alert, responsive and in no apparent distress  Pt completed bed mobility with CGA/min A  Pt completed sit to stand with min A/CGA  Pt completed ADLS at bedside  Pt demonstrated ability to/was able to complete UB ADLS with min A and LB ADLS with mod A  Pt completed side stepping functional mobility with min A and handheld A  Pt seemed to have some level of learned helplessness and required constant encouragement to participate  Pt requiring VCs and A throughout and edu in energy conservation, work simplification, pursed lipped breathing, safety and increasing independence with ADL performance  Pt continues to be functioning below baseline level, occupational performance remains limited secondary to factors listed above and increased risk for falls and injury   From OT standpoint, recommendation at time of d/c would be post acute rehab  Pt to benefit from continued Occupational Therapy treatment while in the hospital to address deficits as defined above and maximize level of functional independence with ADLs and functional mobility       OT Discharge Recommendation: Post acute rehabilitation services

## 2023-02-28 VITALS
HEART RATE: 82 BPM | SYSTOLIC BLOOD PRESSURE: 102 MMHG | TEMPERATURE: 97.6 F | BODY MASS INDEX: 19.39 KG/M2 | OXYGEN SATURATION: 97 % | RESPIRATION RATE: 17 BRPM | HEIGHT: 60 IN | DIASTOLIC BLOOD PRESSURE: 59 MMHG | WEIGHT: 98.77 LBS

## 2023-02-28 LAB
ANION GAP SERPL CALCULATED.3IONS-SCNC: 3 MMOL/L (ref 4–13)
ARTERIAL PATENCY WRIST A: YES
BASE EXCESS BLDA CALC-SCNC: 6.5 MMOL/L
BUN SERPL-MCNC: 18 MG/DL (ref 5–25)
CALCIUM SERPL-MCNC: 8.2 MG/DL (ref 8.3–10.1)
CHLORIDE SERPL-SCNC: 91 MMOL/L (ref 96–108)
CO2 SERPL-SCNC: 35 MMOL/L (ref 21–32)
CREAT SERPL-MCNC: 0.39 MG/DL (ref 0.6–1.3)
ERYTHROCYTE [DISTWIDTH] IN BLOOD BY AUTOMATED COUNT: 13.2 % (ref 11.6–15.1)
GFR SERPL CREATININE-BSD FRML MDRD: 125 ML/MIN/1.73SQ M
GLUCOSE SERPL-MCNC: 186 MG/DL (ref 65–140)
GLUCOSE SERPL-MCNC: 65 MG/DL (ref 65–140)
GLUCOSE SERPL-MCNC: 92 MG/DL (ref 65–140)
HCO3 BLDA-SCNC: 31.4 MMOL/L (ref 22–28)
HCT VFR BLD AUTO: 23.7 % (ref 36.5–49.3)
HGB BLD-MCNC: 7.7 G/DL (ref 12–17)
MCH RBC QN AUTO: 32.2 PG (ref 26.8–34.3)
MCHC RBC AUTO-ENTMCNC: 32.5 G/DL (ref 31.4–37.4)
MCV RBC AUTO: 99 FL (ref 82–98)
NASAL CANNULA: 2
O2 CT BLDA-SCNC: 11.8 ML/DL (ref 16–23)
OXYHGB MFR BLDA: 96.3 % (ref 94–97)
PCO2 BLDA: 47.8 MM HG (ref 36–44)
PH BLDA: 7.44 [PH] (ref 7.35–7.45)
PLATELET # BLD AUTO: 177 THOUSANDS/UL (ref 149–390)
PMV BLD AUTO: 9.9 FL (ref 8.9–12.7)
PO2 BLDA: 101.9 MM HG (ref 75–129)
POTASSIUM SERPL-SCNC: 3.5 MMOL/L (ref 3.5–5.3)
RBC # BLD AUTO: 2.39 MILLION/UL (ref 3.88–5.62)
SODIUM SERPL-SCNC: 129 MMOL/L (ref 135–147)
SPECIMEN SOURCE: ABNORMAL
WBC # BLD AUTO: 8.04 THOUSAND/UL (ref 4.31–10.16)

## 2023-02-28 RX ORDER — FERROUS SULFATE 325(65) MG
325 TABLET ORAL EVERY OTHER DAY
Refills: 0
Start: 2023-03-01

## 2023-02-28 RX ORDER — ACETAMINOPHEN 325 MG/1
975 TABLET ORAL EVERY 8 HOURS PRN
Refills: 0
Start: 2023-02-28

## 2023-02-28 RX ORDER — AMOXICILLIN 250 MG
1 CAPSULE ORAL
Refills: 0
Start: 2023-02-28

## 2023-02-28 RX ORDER — PREDNISONE 10 MG/1
10 TABLET ORAL DAILY
Qty: 3 TABLET | Refills: 0
Start: 2023-03-02 | End: 2023-03-05

## 2023-02-28 RX ORDER — ESCITALOPRAM OXALATE 5 MG/1
5 TABLET ORAL DAILY
Refills: 0
Start: 2023-03-01

## 2023-02-28 RX ORDER — FUROSEMIDE 20 MG/1
20 TABLET ORAL DAILY
Status: DISCONTINUED | OUTPATIENT
Start: 2023-02-28 | End: 2023-02-28 | Stop reason: HOSPADM

## 2023-02-28 RX ORDER — LOSARTAN POTASSIUM 25 MG/1
12.5 TABLET ORAL DAILY
Refills: 0
Start: 2023-03-01

## 2023-02-28 RX ORDER — LEVALBUTEROL 1.25 MG/.5ML
1.25 SOLUTION, CONCENTRATE RESPIRATORY (INHALATION)
Refills: 0
Start: 2023-02-28

## 2023-02-28 RX ORDER — PREDNISONE 20 MG/1
20 TABLET ORAL DAILY
Qty: 1 TABLET | Refills: 0
Start: 2023-03-01 | End: 2023-03-02

## 2023-02-28 RX ORDER — FORMOTEROL FUMARATE 20 UG/2ML
20 SOLUTION RESPIRATORY (INHALATION) 2 TIMES DAILY
Qty: 120 ML | Refills: 0
Start: 2023-02-28 | End: 2023-03-30

## 2023-02-28 RX ORDER — TAMSULOSIN HYDROCHLORIDE 0.4 MG/1
0.4 CAPSULE ORAL
Refills: 0
Start: 2023-02-28

## 2023-02-28 RX ORDER — PREDNISONE 1 MG/1
5 TABLET ORAL DAILY
Refills: 0
Start: 2023-03-05

## 2023-02-28 RX ORDER — FUROSEMIDE 20 MG/1
20 TABLET ORAL DAILY
Qty: 15 TABLET | Refills: 0
Start: 2023-02-28 | End: 2023-03-01 | Stop reason: SDUPTHER

## 2023-02-28 RX ADMIN — FUROSEMIDE 20 MG: 20 TABLET ORAL at 09:22

## 2023-02-28 RX ADMIN — FORMOTEROL FUMARATE DIHYDRATE 20 MCG: 20 SOLUTION RESPIRATORY (INHALATION) at 07:01

## 2023-02-28 RX ADMIN — CHLORHEXIDINE GLUCONATE 0.12% ORAL RINSE 15 ML: 1.2 LIQUID ORAL at 08:39

## 2023-02-28 RX ADMIN — LOSARTAN POTASSIUM 12.5 MG: 25 TABLET, FILM COATED ORAL at 08:40

## 2023-02-28 RX ADMIN — BUDESONIDE 0.5 MG: 0.5 INHALANT ORAL at 07:02

## 2023-02-28 RX ADMIN — GUAIFENESIN 600 MG: 600 TABLET, EXTENDED RELEASE ORAL at 08:40

## 2023-02-28 RX ADMIN — CYANOCOBALAMIN TAB 500 MCG 1000 MCG: 500 TAB at 08:39

## 2023-02-28 RX ADMIN — ESCITALOPRAM OXALATE 5 MG: 5 TABLET, FILM COATED ORAL at 08:41

## 2023-02-28 RX ADMIN — IPRATROPIUM BROMIDE 0.5 MG: 0.5 SOLUTION RESPIRATORY (INHALATION) at 07:02

## 2023-02-28 RX ADMIN — PREDNISONE 20 MG: 20 TABLET ORAL at 08:39

## 2023-02-28 RX ADMIN — ASPIRIN 81 MG CHEWABLE TABLET 81 MG: 81 TABLET CHEWABLE at 08:39

## 2023-02-28 RX ADMIN — POLYETHYLENE GLYCOL 3350 17 G: 17 POWDER, FOR SOLUTION ORAL at 09:58

## 2023-02-28 RX ADMIN — ENOXAPARIN SODIUM 40 MG: 40 INJECTION SUBCUTANEOUS at 08:39

## 2023-02-28 RX ADMIN — Medication 20 MG: at 08:39

## 2023-02-28 RX ADMIN — DICLOFENAC SODIUM 2 G: 10 GEL TOPICAL at 08:41

## 2023-02-28 RX ADMIN — LEVALBUTEROL HYDROCHLORIDE 1.25 MG: 1.25 SOLUTION, CONCENTRATE RESPIRATORY (INHALATION) at 07:02

## 2023-02-28 NOTE — PROGRESS NOTES
Heart Failure/ Pulmonary Hypertension Progress Note - Ava Marie  74 y o  male MRN: [id number]    Unit/Bed#: -01 Encounter: [id number]      Assessment:    Principal Problem:    Chronic obstructive pulmonary disease (HCC)  Active Problems:    Nonobstructive atherosclerosis of coronary artery    KEVIN and COPD overlap syndrome (HCC)    Gastroesophageal reflux disease    Acute on chronic respiratory failure with hypercapnia (HCC)    Protein-calorie malnutrition (HCC)    Acute on chronic HFrEF (heart failure with reduced ejection fraction) (HCC)    Anemia    Physical deconditioning    COVID-19 virus infection      Subjective:   Patient seen and examined  No significant events overnight  Good response to IV diuretic yesterday  For D/C today  Objective: Intake/ Output: 360/1700/-1 3 L  Weight:   Tele: NA    Plan:  Acute on Chronic HFrEF; LVEF 20-25%; NYHA III; ACC/AHA Stage C  Etiology: Nonischemic cardiomyopathy   Possible dyssynchrony from chronic LBBB   Despite QRS only being 120 ms, echo shows significant dyssynchrony  Recent drop in EF likely related to stress myopathy with acute exacerbation of COPD requiring intubation  Good response to IV diuretic yesterday  Place back on oral regimen with plan for discharge today to [facility]       TTE 2/21/23: LVEF 30%  LVIDd 6 6cm  severe global hypokinesis  Grade I DD  RV cavity size and systolic function normal  Mild TR    TTE 2/12/2023: LVEF 20-25%   Severe global hypokinesis with regional variation   Grade 1 DD   Abnormal septal motion consistent with LBBB   RV cavity mildly dilated, normal systolic function   Mild MR, mild TR   RVSP 38   Dilated IVC    TTE 8/26/2022: LVEF 40%   RV cavity mildly dilated   Systolic function normal   Mild MR, TR   Normal IVC      Neurohormonal Blockade:  --Beta Blocker: none 2/2 c/f bronchoconstriction  --ARNi / ACEi / ARB: losartan 12 5 mg QD  --Aldosterone Antagonist: future maybe   --SGLT2 Inhibitor: no  --Home Diuretic: Lasix 20 mg daily  --Inpatient Diuretic: Lasix 20 mg QD, IV PRN     Sudden Cardiac Death Risk Reduction:  --ICD: LVEF newly reduced to 20-25%     Electrical Resynchronization:  --Candidacy for BiV device: QRSd 120ms, chronic LBBB with dyssynchrony on echocardiogram      Advanced Therapies (if appropriate): Not appropriate at this time, particularly considering advanced lung disease      Chronic hyponatremia  COVID-19  Tested positive on 2/22/23  Nonobstructive CAD  Has coronary calcium on CT  On aspirin and statin  Hyperlipidemia  Continue statin  LDL 75 8/2021  COPD (end stage)  Former smoker; 105 pack years, quit in 2012  Severe disease, follows closely with pulm   On home oxygen, 3-4L  Hospitalized with acute COPD exacerbation requiring intubation, now extubated  Management per pulm  KEVIN   On BiPAP nightly     Review of Systems   All other systems reviewed and are negative  [employer] (day, reason): Stanley catheter (day, reason):    Vitals: Blood pressure 102/59, pulse 82, temperature 97 6 °F (36 4 °C), resp  rate 17, height 5' (1 524 m), weight 44 8 kg (98 lb 12 3 oz), SpO2 97 %  , Body mass index is 19 29 kg/m² , I/O last 3 completed shifts: In: 360 [P O :360]  Out: 1900 [Urine:1900]  No intake/output data recorded  Wt Readings from Last 3 Encounters:   02/28/23 44 8 kg (98 lb 12 3 oz)   01/17/23 48 1 kg (106 lb)   12/19/22 44 3 kg (97 lb 9 6 oz)       Intake/Output Summary (Last 24 hours) at 2/28/2023 0904  Last data filed at 2/28/2023 0501  Gross per 24 hour   Intake 360 ml   Output 1700 ml   Net -1340 ml     I/O last 3 completed shifts: In: 360 [P O :360]  Out: 1900 [Urine:1900]    No significant arrhythmias seen on telemetry review         Physical Exam:  Vitals:    02/28/23 0500 02/28/23 0600 02/28/23 0704 02/28/23 0715   BP: 96/52   102/59   BP Location:       Pulse:       Resp:    17   Temp:    97 6 °F (36 4 °C)   TempSrc:       SpO2:   97%    Weight:  44 8 kg (98 lb 12 3 oz)     Height: GEN: Radha Lopez   appears well, alert and oriented x 3, pleasant and cooperative   HEENT: pupils equal, round, and reactive to light; extraocular muscles intact  NECK: supple, no carotid bruits   HEART: regular rhythm, normal S1 and S2, no murmurs, clicks, gallops or rubs, JVP is    LUNGS: clear to auscultation bilaterally; no wheezes, rales, or rhonchi   ABDOMEN: normal bowel sounds, soft, no tenderness, no distention  EXTREMITIES: peripheral pulses normal; no clubbing, cyanosis, or edema  NEURO: no focal findings   SKIN: normal without suspicious lesions on exposed skin      Current Facility-Administered Medications:   •  acetaminophen (TYLENOL) tablet 975 mg, 975 mg, Oral, Q8H PRN, Sushant Banai, DO, 975 mg at 02/22/23 9316  •  albuterol (PROVENTIL HFA,VENTOLIN HFA) inhaler 2 puff, 2 puff, Inhalation, Q4H PRN, Sushant Banai, DO, 2 puff at 02/23/23 1711  •  aspirin chewable tablet 81 mg, 81 mg, Oral, Daily, Kamron P Ye, DO, 81 mg at 02/28/23 8340  •  budesonide (PULMICORT) inhalation solution 0 5 mg, 0 5 mg, Nebulization, Q12H, Kamron P Ye, DO, 0 5 mg at 02/28/23 0702  •  chlorhexidine (PERIDEX) 0 12 % oral rinse 15 mL, 15 mL, Mouth/Throat, Q12H [address], Kamron P Ye, DO, 15 mL at 02/28/23 8944  •  cyanocobalamin (VITAMIN B-12) tablet 1,000 mcg, 1,000 mcg, Oral, Daily, Sana Moser MD, 6,234 mcg at 02/28/23 9141  •  Diclofenac Sodium (VOLTAREN) 1 % topical gel 2 g, 2 g, Topical, 4x Daily, Kamron P Ye, DO, 2 g at 02/28/23 0138  •  enoxaparin (LOVENOX) subcutaneous injection 40 mg, 40 mg, Subcutaneous, Daily, Kamron P Ye, DO, 40 mg at 02/28/23 9743  •  ergocalciferol (VITAMIN D2) capsule 50,000 Units, 50,000 Units, Oral, Q28 Days, Kamron Ye DO  •  escitalopram (LEXAPRO) tablet 5 mg, 5 mg, Oral, Daily, Kamron Ye DO, 5 mg at 02/28/23 7756  •  ferrous sulfate tablet 325 mg, 325 mg, Oral, Every Other Day, Charlane Hodgkin, DO, 325 mg at 02/27/23 0940  •  formoterol (PERFOROMIST) nebulizer solution 20 mcg, 20 mcg, Nebulization, Q12H, Kamron P Ey, DO, 20 mcg at 02/28/23 0701  •  guaiFENesin (MUCINEX) 12 hr tablet 600 mg, 600 mg, Oral, Q12H [facility] Bibi Moser MD, 133 mg at 02/28/23 0840  •  insulin lispro (HumaLOG) 100 units/mL subcutaneous injection 1-5 Units, 1-5 Units, Subcutaneous, TID AC, 1 Units at 02/27/23 1750 **AND** Fingerstick Glucose (POCT), , , TID AC, Cali Gray Starsinic, DO  •  insulin lispro (HumaLOG) 100 units/mL subcutaneous injection 1-5 Units, 1-5 Units, Subcutaneous, HS, Cali Osei Starsinic, DO, 1 Units at 02/27/23 2220  •  levalbuterol (XOPENEX) inhalation solution 1 25 mg, 1 25 mg, Nebulization, TID, 1 25 mg at 02/28/23 0702 **AND** ipratropium (ATROVENT) 0 02 % inhalation solution 0 5 mg, 0 5 mg, Nebulization, TID, 0 5 mg at 02/28/23 0702 **AND** [DISCONTINUED] sodium chloride 0 9 % inhalation solution 3 mL, 3 mL, Nebulization, 4x Daily, Lurena Lips, DO  •  losartan (COZAAR) tablet 12 5 mg, 12 5 mg, Oral, Daily, Linda Arvizu PA-C, 12 5 mg at 02/28/23 0840  •  omeprazole (PRILOSEC) suspension 2 mg/mL, 20 mg, Oral, Daily, Kamron Ye DO, 20 mg at 02/28/23 5665  •  polyethylene glycol (MIRALAX) packet 17 g, 17 g, Oral, Daily PRN, Isabel Walton DO, 17 g at 02/23/23 1126  •  pravastatin (PRAVACHOL) tablet 80 mg, 80 mg, Oral, Daily With Dinner, Isabel Walton DO, 80 mg at 02/27/23 1738  •  [COMPLETED] predniSONE tablet 40 mg, 40 mg, Oral, Daily, 40 mg at 02/23/23 1125 **FOLLOWED BY** [COMPLETED] predniSONE tablet 30 mg, 30 mg, Oral, Daily, 30 mg at 02/26/23 0942 **FOLLOWED BY** predniSONE tablet 20 mg, 20 mg, Oral, Daily, 20 mg at 02/28/23 0839 **FOLLOWED BY** [START ON 3/2/2023] predniSONE tablet 10 mg, 10 mg, Oral, Daily, Madison Epperson, DO  •  [START ON 3/5/2023] predniSONE tablet 5 mg, 5 mg, Oral, Daily, Madison Epperson, DO  •  senna-docusate sodium (SENOKOT S) 8 6-50 mg per tablet 1 tablet, 1 tablet, Oral, HS, Charlane Hodgkin, DO, 1 tablet at 02/27/23 2221  •  sodium chloride (OCEAN) 0 65 % nasal spray 2 spray, 2 spray, Each Nare, Q1H PRN, Alvaro Spencer DO  •  tamsulosin (FLOMAX) capsule 0 4 mg, 0 4 mg, Oral, Daily With Hannah Arrington DO, 0 4 mg at 02/27/23 1738      Labs & Results:        Results from last 7 days   Lab Units 02/28/23  0359 02/27/23  0434 02/26/23  0540   WBC Thousand/uL 8 04 11 87* 7 96   HEMOGLOBIN g/dL 7 7* 7 8* 7 8*   HEMATOCRIT % 23 7* 24 1* 24 9*   PLATELETS Thousands/uL 177 197 201         Results from last 7 days   Lab Units 02/28/23  0359 02/27/23  0434 02/26/23  0540   POTASSIUM mmol/L 3 5 4 0 4 1   CHLORIDE mmol/L 91* 91* 94*   CO2 mmol/L 35* 33* 32   BUN mg/dL 18 17 14   CREATININE mg/dL 0 39* 0 37* 0 36*   CALCIUM mg/dL 8 2* 8 2* 8 3           Counseling / Coordination of Care  Total floor / unit time spent today 20 minutes  Greater than 50% of total time was spent with the patient and / or family counseling and / or coordination of care  A description of the counseling / coordination of care: 20  Thank you for the opportunity to participate in the care of this patient  Bren Mejia

## 2023-02-28 NOTE — DISCHARGE SUMMARY
[address] [facility]  Discharge- [facility]  1955, 74 y o  male MRN: [id number]  Unit/Bed#: -01 Encounter: [id number]  Primary Care Provider: Laine Kay DO   Date and time admitted to hospital: 2/12/2023  5:07 AM    * Chronic obstructive pulmonary disease ([address] )  Assessment & Plan  · Follows with Pulm as an outpatient  · Last FEV1 22%, consistent with very-severe COPD  · Home regimen:  · 3L O2 at home  · All nebs- Duo-Neb/Pulmicort/Perforomist  · Albuterol rescue inhaler  · Prednisone 5 daily  · Multiple prior exacerbations requiring admission  · Intubated 2/12, extubated 2/15    · Continue bronchodilators - scheduled Xopenx/Atrovent TID, perforomist/pulmicort BID on discharge  · Continue p o  prednisone taper on discharge  · Aggressive pulmonary hygiene- use of incentive spirometry, VEST/airway clearance, OOB to chair as able  · Palliative consulted, appreciate recommendations  · Goals of care discussed prior with patient with wife at bedside, patient to remain level 3 DNR/DNI   · Discharged to [facility] acute rehab for ongoing care        Acute on chronic HFrEF (heart failure with reduced ejection fraction) ([facility])  Assessment & Plan  · NYHA class: III  / ACC/AHA stage: C; in setting of very severe COPD (FEV1 22%)  · Follows with Dr Micky Lemos as an outpatient  · EF 40% PTA on lasix 20mg qd at home  · TTE on admission showing EF 20-25% with abnormal septal motion in setting of chronic LBBB, RVSP 38, mildly dilated RV  · Clinical presentation on arrival consistent with biventricular heart failure in setting of AECOPD  · Repeated TTE on 2/21 to reassess EF showed EF 30%  · Goal-directed medical therapy on losartan; not on BB given severe end stage COPD  · Sudden cardiac death risk reduction:  · Not candidate for ICD/Life Vest candidacy due to DNR/DNI  · Of note, patient has known hx of LBBB with QRS around 120ms    · Cardiac diet, sodium restriction <2g, fluid restriction <1 5L    Acute on chronic respiratory failure with hypercapnia (HCC)  Assessment & Plan  · Chronically on 3 L at home  · Currently on baseline 3 L supplemental O2  · Continue with treatment plan as above      COVID-19 virus infection  Assessment & Plan  · Patient tested positive for COVID on 2/22/2023  Patient on 3L nasal cannula  · Monitor WBC and fever curve  · Continue oxygen segmentation to maintain SPO2 >88%  · Pulm re-evaluated- they recommended to continue current steroid taper, consider antiviral (s/p remdesivir x 3 days per protocol given patient's risk factors: age, chronic heart and lung disease), no need for escalated anti-inflammatory per Pulm  Physical deconditioning  Assessment & Plan  · Evaluated by PT/OT with recommendation for postacute rehab  · Case management following, plan for [facility]     Anemia  Assessment & Plan  · Continue B12 and iron supplementation on discharge    Protein-calorie malnutrition [facility])  Assessment & Plan  · Patient noted to be cachectic/ill-appearing    · 2/2 catabolic illness/COPD   · Continue Ensure supplementations with daily meals      Gastroesophageal reflux disease  Assessment & Plan  · Continue PPI    KEVIN and COPD overlap syndrome (HCC)  Assessment & Plan  · Uses BiPAP at home  · Continue BiPAP 12/6/40% qHs    Nonobstructive atherosclerosis of coronary artery  Assessment & Plan  · Coronary calcification seen on prior CT   · Follows with Dr Porsche Delatorre as an outpatient  · Continue with ASCVD prophylaxis with ASA and statin        Medical Problems     Resolved Problems  Date Reviewed: 2/28/2023          Resolved    SIRS (systemic inflammatory response syndrome) ([facility] [address] ) 2/19/2023     Resolved by  Jordy Toribio DO    Overview Deleted 2/17/2023  2:35 PM by Faizan Peña DO            Hyponatremia 2/17/2023     Resolved by  Faizan Peña DO        Discharging Physician / Practitioner: Jordy Toribio DO  PCP: Benjy Galvan DO  Admission Date:   Admission Orders (From admission, onward)     Ordered        02/12/23 0631  Inpatient Admission  Once                      Discharge Date: 02/28/23    Consultations During Hospital Stay:  · Critical care  · Pulmonology  · Heart failure  · Palliative care    Procedures Performed:   · Intubation/extubation  · Central line insertion    Significant Findings / Test Results:   · Severe end-stage COPD  · Acute on chronic heart failure with reduced ejection fraction  · COVID infection identified 2/23    Test Results Pending at Discharge (will require follow up): · None     Outpatient Tests Requested:  · None    Complications:  None    Reason for Admission: Acute on chronic respiratory failure    Hospital Course:   Marilu Crouch  is a 74 y o  male patient who originally presented to the hospital on 2/12/2023 due to worsening respiratory status  He was initially admitted to the ICU due to the severity of his respiratory distress and intubated  He remained under their care intubated for several days until he could be extubated  He was treated for acute on chronic hypoxic respiratory failure secondary to COPD exacerbation with belies breathing treatments and IV steroids  He was also seen by the heart failure team secondary to acute on chronic heart failure with reduced ejection fraction, but responded well to diuresis  For the course of his hospital stay he gradually improved with the treatment as above and was weaned to his baseline 3 L of supplemental oxygen  He was seen by palliative care given the end-stage nature of both his COPD and heart failure  He is a DNR/DNI but willing to continue with active medical management  His hospitalization was complicated by positive COVID test on 2/23, despite a negative test on admission  While he had no respiratory compromise in the setting of this new positive COVID test, he was treated empirically with IV remdesivir given his high risk status    He remained stable at his baseline O2 requirements for several days prior to discharge  He will be discharged to [facility] for ongoing care  All questions and concerns were addressed  Please see above list of diagnoses and related plan for additional information  Condition at Discharge: stable    Discharge Day Visit / Exam:   Subjective: Patient seen and examined on the day of discharge  No obvious respiratory distress  Feels that his breathing is at stable  Comfortable on 2 to 3 L nasal cannula O2  Vitals: Blood Pressure: 102/59 (02/28/23 0715)  Pulse: 82 (02/28/23 0018)  Temperature: 97 6 °F (36 4 °C) (02/28/23 0715)  Temp Source: Oral (02/27/23 1520)  Respirations: 17 (02/28/23 0715)  Height: 5' (152 4 cm) (02/21/23 1400)  Weight - Scale: 44 8 kg (98 lb 12 3 oz) (02/28/23 0600)  SpO2: 97 % (02/28/23 0839)  PHYSICAL EXAM:    Vitals signs reviewed  Constitutional   Awake and cooperative  NAD  Cachectic appearing  Chronically ill-appearing  Head/Neck   Normocephalic  Atraumatic  HEENT   No scleral icterus  EOMI  Heart   Regular rate and rhythm  No murmers  Lungs  ribs easily palpable  Cachectic  Prolonged expiration without overt wheezing or rhonchi  Adequate air movement  Respirations unlabored  Abdomen   Soft  Nontender  Nondistended  Skin   Skin color normal  No rashes  Extremities   No deformities  No peripheral edema  Neuro   Alert and oriented  No new deficits  Psych   Mood stable  Affect normal          Discussion with Family: Attempted to update  (wife) via phone  Left voicemail  Discharge instructions/Information to patient and family:   See after visit summary for information provided to patient and family  Provisions for Follow-Up Care:  See after visit summary for information related to follow-up care and any pertinent home health orders         Disposition:   Acute Rehab at [facility]    Planned Readmission: NO     Discharge Statement:  I spent 40 minutes discharging the patient  This time was spent on the day of discharge  I had direct contact with the patient on the day of discharge  Greater than 50% of the total time was spent examining patient, answering all patient questions, arranging and discussing plan of care with patient as well as directly providing post-discharge instructions  Additional time then spent on discharge activities  Discharge Medications:  See after visit summary for reconciled discharge medications provided to patient and/or family        **Please Note: This note may have been constructed using a voice recognition system**

## 2023-02-28 NOTE — PLAN OF CARE
Problem: MOBILITY - ADULT  Goal: Maintain or return to baseline ADL function  Description: INTERVENTIONS:  -  Assess patient's ability to carry out ADLs; assess patient's baseline for ADL function and identify physical deficits which impact ability to perform ADLs (bathing, care of mouth/teeth, toileting, grooming, dressing, etc )  - Assess/evaluate cause of self-care deficits   - Assess range of motion  - Assess patient's mobility; develop plan if impaired  - Assess patient's need for assistive devices and provide as appropriate  - Encourage maximum independence but intervene and supervise when necessary  - Involve family in performance of ADLs  - Assess for home care needs following discharge   - Consider OT consult to assist with ADL evaluation and planning for discharge  - Provide patient education as appropriate  Outcome: Progressing  Goal: Maintains/Returns to pre admission functional level  Description: INTERVENTIONS:  - Perform BMAT or MOVE assessment daily    - Set and communicate daily mobility goal to care team and patient/family/caregiver  - Collaborate with rehabilitation services on mobility goals if consulted  - Perform Range of Motion 3 times a day  - Reposition patient every 2 hours    - Dangle patient 3 times a day  - Stand patient 3 times a day  - Ambulate patient 3 times a day  - Out of bed to chair 3 times a day   - Out of bed for meals 3 times a day  - Out of bed for toileting  - Record patient progress and toleration of activity level   Outcome: Progressing     Problem: DISCHARGE PLANNING  Goal: Discharge to home or other facility with appropriate resources  Description: INTERVENTIONS:  - Identify barriers to discharge w/patient and caregiver  - Arrange for needed discharge resources and transportation as appropriate  - Identify discharge learning needs (meds, wound care, etc )  - Arrange for interpretive services to assist at discharge as needed  - Refer to Case Management Department for coordinating discharge planning if the patient needs post-hospital services based on physician/advanced practitioner order or complex needs related to functional status, cognitive ability, or social support system  Outcome: Progressing

## 2023-02-28 NOTE — PROGRESS NOTES
PULMONOLOGY PROGRESS NOTE     Name: Abimbola Navarro  Age & Sex: 74 y o  male   MRN: [id number]  Unit/Bed#: -01   Encounter: [id number]    PATIENT INFORMATION     Name: Abimbola Navarro  Age & Sex: 74 y o  male   MRN: [id number]  Hospital Stay Days: 16    ASSESSMENT/PLAN     1  Acute on chronic respiratory failure with hypoxia and hypercapnia  - Improving  - Chronic 3L at baseline, currently at baseline  - Secondary to COPD and CHF  - Intubated on admission 2/12/23 and extubated 2/15  - Does not have PAP therapy at home and unfortunately does not qualify for discharge with BiPAP as his morning ABG CO2 was 47 8 today     2  End-stage COPD with acute exacerbation  - Frequent exacerbations  - FEV1 22%  - On all nebulized regimen: Perforomist, budesonide, atrovent, albuterol both here and at home     3  CAP   - Resolved  - Completed course of CTX and azithro     4  HFrEF with acute decompensation  - NICM, nonobstructive CAD  - Improved with diuresis  - EF 20-25% on echo 2/12/23 with G1DD and global hypokinesis, RV dilation, mildly increased est PASP  - Repeat echo 2/21/23 shows dilated LV, EF 30%, global hypokinesis F1ZI, RV systolic function normal     5  Positive SARS-CoV-2 test  - 2/22/23  - Does not appear to be contributing to respiratory failure  - Vaccinated with Pfizer x3 with last dose 6/15/21     5  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     6  KEVIN  - Reported, no sleep study available  - Utilizing BiPAP while inpatient QHS      7  Pulmonary nodules  - Stable 5mm RLL nodule and 3mm RUL nodule     8  Former smoker    5  Macrocytic anemia  - Significantly anemic from his recent baseline last year with hgb ~12-13   - Hgb 7 7 today, relatively stable over this hospitalization  - B12 and folate levels normal  - Iron panel suggesting anemia of chronic disease, maybe with some iron deficiency component       Plan:  - Did not qualify for discharge with home BiPAP   Okay from a pulmonary standpoint for discharge with supplemental O2   - Will need an outpatient in-lab sleep study given his comorbidities  - Continue to mild COVID treatment pathway  - Continue nebulizer regimen here and on discharge  - Continue prednisone taper  20mg daily x3 days, followed by 10mg daily for 3 days  When taper is completed, resume chronic 5mg prednisone daily  - Diuresis with heart failure service input  - Monitor glucose levels and adjust insulin regimen as necessary  - Titrate supplemental O2 to maintain sats > 88%    Pulmonary plan appears to be set  Will sign off at this point, but please do not hesitate to call with questions or concerns  SUBJECTIVE     Patient seen and examined  No acute events overnight  He endorses dyspnea with any exertion, but feels better at rest  He denies fevers or generalized illness, but did state he was a little cold  He does have a cough without significant sputum production  ROS otherwise normal     OBJECTIVE     Vitals:    23 0500 23 0600 23 0704 23 0715   BP: 96/52   102/59   BP Location:       Pulse:       Resp:    17   Temp:    97 6 °F (36 4 °C)   TempSrc:       SpO2:   97%    Weight:  44 8 kg (98 lb 12 3 oz)     Height:          Temperature:   Temp (24hrs), Av 8 °F (36 6 °C), Min:97 6 °F (36 4 °C), Max:98 2 °F (36 8 °C)    Temperature: 97 6 °F (36 4 °C)  Intake & Output:  I/O        0701   0700  0701   07 0701   0700    P  O  354 360     Total Intake(mL/kg) 354 (7 4) 360 (8)     Urine (mL/kg/hr) 550 (0 5) 1700 (1 6)     Total Output 550 1700     Net -196 -1340                Weights:   IBW (Ideal Body Weight): 50 kg    Body mass index is 19 29 kg/m²  Weight (last 2 days)     Date/Time Weight    23 0600 44 8 (98 77)    23 0545 48 (105 82)        Physical Exam  Constitutional:       General: He is not in acute distress  Appearance: Normal appearance  He is ill-appearing     Eyes:      General: No scleral icterus  Conjunctiva/sclera: Conjunctivae normal    Cardiovascular:      Rate and Rhythm: Normal rate and regular rhythm  Heart sounds: No murmur heard  No friction rub  No gallop  Pulmonary:      Breath sounds: No wheezing, rhonchi or rales  Abdominal:      Palpations: Abdomen is soft  Tenderness: There is no abdominal tenderness  Skin:     General: Skin is warm and dry  Neurological:      General: No focal deficit present  Mental Status: He is alert  He is disoriented  LABORATORY DATA     Labs: I have personally reviewed pertinent reports  Results from last 7 days   Lab Units 02/28/23  0359 02/27/23  0434 02/26/23  0540 02/25/23  0518 02/24/23  0904 02/23/23  1154   WBC Thousand/uL 8 04 11 87* 7 96   < > 8 41 10 13   HEMOGLOBIN g/dL 7 7* 7 8* 7 8*   < > 8 7* 9 9*   HEMATOCRIT % 23 7* 24 1* 24 9*   < > 26 2* 31 7*   PLATELETS Thousands/uL 177 197 201   < > 237 281   NEUTROS PCT %  --   --   --   --  78* 79*   MONOS PCT %  --   --   --   --  14* 14*    < > = values in this interval not displayed  Results from last 7 days   Lab Units 02/28/23  0359 02/27/23  0434 02/26/23  0540   POTASSIUM mmol/L 3 5 4 0 4 1   CHLORIDE mmol/L 91* 91* 94*   CO2 mmol/L 35* 33* 32   BUN mg/dL 18 17 14   CREATININE mg/dL 0 39* 0 37* 0 36*   CALCIUM mg/dL 8 2* 8 2* 8 3       ABG:   Results from last 7 days   Lab Units 02/28/23  0633   PH ART  7 436   PCO2 ART mm Hg 47 8*   PO2 ART mm Hg 101 9   HCO3 ART mmol/L 31 4*   BASE EXC ART mmol/L 6 5   ABG SOURCE  Radial, Right       IMAGING & DIAGNOSTIC TESTING     Radiology Results: I have personally reviewed pertinent reports  XR chest portable    Result Date: 2/13/2023  Impression: 1  Persistent high positioning of the endotracheal tube  Advancement by at least 4 cm advised  2   Tip of right internal jugular central venous catheter projects over the lower SVC  No pneumothorax  3   Persistent pulmonary vascular congestion   The study was marked in EPIC for immediate notification  Workstation performed: [id number]     XR chest 1 view portable    Result Date: 2/12/2023  Impression: Moderate pulmonary venous congestion  ET tube 6 cm above the ghada  Workstation performed: [id number]     CT head wo contrast    Result Date: 2/12/2023  Impression: No acute intracranial abnormality  Workstation performed: [id number]     XR chest portable ICU    Result Date: 2/15/2023  Impression: Endotracheal tube projects 2-3 cm above the ghada  Stable moderate pulmonary vascular congestion  Workstation performed: [id number]     XR chest portable ICU    Result Date: 2/13/2023  Impression: Tubes and lines as above without pneumothorax  Recommend advancement of nasogastric tube  No acute cardiopulmonary disease  The study was marked in [facility] for immediate notification  Workstation performed: [id number]     Other Diagnostic Testing: I have personally reviewed pertinent reports      ACTIVE MEDICATIONS     Current Facility-Administered Medications   Medication Dose Route Frequency   • acetaminophen (TYLENOL) tablet 975 mg  975 mg Oral Q8H PRN   • albuterol (PROVENTIL HFA,VENTOLIN HFA) inhaler 2 puff  2 puff Inhalation Q4H PRN   • aspirin chewable tablet 81 mg  81 mg Oral Daily   • budesonide (PULMICORT) inhalation solution 0 5 mg  0 5 mg Nebulization Q12H   • chlorhexidine (PERIDEX) 0 12 % oral rinse 15 mL  15 mL Mouth/Throat Q12H GABE   • cyanocobalamin (VITAMIN B-12) tablet 1,000 mcg  1,000 mcg Oral Daily   • Diclofenac Sodium (VOLTAREN) 1 % topical gel 2 g  2 g Topical 4x Daily   • enoxaparin (LOVENOX) subcutaneous injection 40 mg  40 mg Subcutaneous Daily   • ergocalciferol (VITAMIN D2) capsule 50,000 Units  50,000 Units Oral Q28 Days   • escitalopram (LEXAPRO) tablet 5 mg  5 mg Oral Daily   • ferrous sulfate tablet 325 mg  325 mg Oral Every Other Day   • formoterol (PERFOROMIST) nebulizer solution 20 mcg  20 mcg Nebulization Q12H   • guaiFENesin (MUCINEX) 12 hr tablet 600 mg  600 mg Oral Q12H [address]   • insulin lispro (HumaLOG) 100 units/mL subcutaneous injection 1-5 Units  1-5 Units Subcutaneous TID AC   • insulin lispro (HumaLOG) 100 units/mL subcutaneous injection 1-5 Units  1-5 Units Subcutaneous HS   • levalbuterol (XOPENEX) inhalation solution 1 25 mg  1 25 mg Nebulization TID    And   • ipratropium (ATROVENT) 0 02 % inhalation solution 0 5 mg  0 5 mg Nebulization TID   • losartan (COZAAR) tablet 12 5 mg  12 5 mg Oral Daily   • omeprazole (PRILOSEC) suspension 2 mg/mL  20 mg Oral Daily   • polyethylene glycol (MIRALAX) packet 17 g  17 g Oral Daily PRN   • pravastatin (PRAVACHOL) tablet 80 mg  80 mg Oral Daily With Dinner   • predniSONE tablet 20 mg  20 mg Oral Daily    Followed by   • [START ON 3/2/2023] predniSONE tablet 10 mg  10 mg Oral Daily   • [START ON 3/5/2023] predniSONE tablet 5 mg  5 mg Oral Daily   • senna-docusate sodium (SENOKOT S) 8 6-50 mg per tablet 1 tablet  1 tablet Oral HS   • sodium chloride (OCEAN) 0 65 % nasal spray 2 spray  2 spray Each Nare Q1H PRN   • tamsulosin (FLOMAX) capsule 0 4 mg  0 4 mg Oral Daily With Dinner       VTE Pharmacologic Prophylaxis: Enoxaparin (Lovenox)  VTE Mechanical Prophylaxis: sequential compression device      Disclaimer: Portions of the record may have been created with voice recognition software  Occasional wrong word or "sound a like" substitutions may have occurred due to the inherent limitations of voice recognition software  Careful consideration should be taken to recognize, using context, where substitutions have occurred      Kelley Salmeron DO  Pulmonary/Critical Care Fellowship PGY-V  [facility]

## 2023-02-28 NOTE — PLAN OF CARE
Problem: SAFETY,RESTRAINT: NV/NON-SELF DESTRUCTIVE BEHAVIOR  Goal: Remains free of harm/injury (restraint for non violent/non self-detsructive behavior)  Description: INTERVENTIONS:  - Instruct patient/family regarding restraint use   - Assess and monitor physiologic and psychological status   - Provide interventions and comfort measures to meet assessed patient needs   - Identify and implement measures to help patient regain control  - Assess readiness for release of restraint   Outcome: Progressing  Goal: Returns to optimal restraint-free functioning  Description: INTERVENTIONS:  - Assess the patient's behavior and symptoms that indicate continued need for restraint  - Identify and implement measures to help patient regain control  - Assess readiness for release of restraint   Outcome: Progressing     Problem: MOBILITY - ADULT  Goal: Maintain or return to baseline ADL function  Description: INTERVENTIONS:  -  Assess patient's ability to carry out ADLs; assess patient's baseline for ADL function and identify physical deficits which impact ability to perform ADLs (bathing, care of mouth/teeth, toileting, grooming, dressing, etc )  - Assess/evaluate cause of self-care deficits   - Assess range of motion  - Assess patient's mobility; develop plan if impaired  - Assess patient's need for assistive devices and provide as appropriate  - Encourage maximum independence but intervene and supervise when necessary  - Involve family in performance of ADLs  - Assess for home care needs following discharge   - Consider OT consult to assist with ADL evaluation and planning for discharge  - Provide patient education as appropriate  Outcome: Progressing  Goal: Maintains/Returns to pre admission functional level  Description: INTERVENTIONS:  - Perform BMAT or MOVE assessment daily    - Set and communicate daily mobility goal to care team and patient/family/caregiver     - Collaborate with rehabilitation services on mobility goals if consulted  - Perform Range of Motion  times a day  - Reposition patient every  hours    - Dangle patient  times a day  - Stand patient  times a day  - Ambulate patient  times a day  - Out of bed to chair  times a day   - Out of bed for meals  times a day  - Out of bed for toileting  - Record patient progress and toleration of activity level   Outcome: Progressing     Problem: PAIN - ADULT  Goal: Verbalizes/displays adequate comfort level or baseline comfort level  Description: Interventions:  - Encourage patient to monitor pain and request assistance  - Assess pain using appropriate pain scale  - Administer analgesics based on type and severity of pain and evaluate response  - Implement non-pharmacological measures as appropriate and evaluate response  - Consider cultural and social influences on pain and pain management  - Notify physician/advanced practitioner if interventions unsuccessful or patient reports new pain  Outcome: Progressing     Problem: INFECTION - ADULT  Goal: Absence or prevention of progression during hospitalization  Description: INTERVENTIONS:  - Assess and monitor for signs and symptoms of infection  - Monitor lab/diagnostic results  - Monitor all insertion sites, i e  indwelling lines, tubes, and drains  - Monitor endotracheal if appropriate and nasal secretions for changes in amount and color  - Steinauer appropriate cooling/warming therapies per order  - Administer medications as ordered  - Instruct and encourage patient and family to use good hand hygiene technique  - Identify and instruct in appropriate isolation precautions for identified infection/condition  Outcome: Progressing  Goal: Absence of fever/infection during neutropenic period  Description: INTERVENTIONS:  - Monitor WBC    Outcome: Progressing     Problem: SAFETY ADULT  Goal: Maintain or return to baseline ADL function  Description: INTERVENTIONS:  -  Assess patient's ability to carry out ADLs; assess patient's baseline for ADL function and identify physical deficits which impact ability to perform ADLs (bathing, care of mouth/teeth, toileting, grooming, dressing, etc )  - Assess/evaluate cause of self-care deficits   - Assess range of motion  - Assess patient's mobility; develop plan if impaired  - Assess patient's need for assistive devices and provide as appropriate  - Encourage maximum independence but intervene and supervise when necessary  - Involve family in performance of ADLs  - Assess for home care needs following discharge   - Consider OT consult to assist with ADL evaluation and planning for discharge  - Provide patient education as appropriate  Outcome: Progressing  Goal: Maintains/Returns to pre admission functional level  Description: INTERVENTIONS:  - Perform BMAT or MOVE assessment daily    - Set and communicate daily mobility goal to care team and patient/family/caregiver  - Collaborate with rehabilitation services on mobility goals if consulted  - Perform Range of Motion  times a day  - Reposition patient every  hours    - Dangle patient  times a day  - Stand patient  times a day  - Ambulate patient  times a day  - Out of bed to chair  times a day   - Out of bed for meals  times a day  - Out of bed for toileting  - Record patient progress and toleration of activity level   Outcome: Progressing  Goal: Patient will remain free of falls  Description: INTERVENTIONS:  - Educate patient/family on patient safety including physical limitations  - Instruct patient to call for assistance with activity   - Consult OT/PT to assist with strengthening/mobility   - Keep Call bell within reach  - Keep bed low and locked with side rails adjusted as appropriate  - Keep care items and personal belongings within reach  - Initiate and maintain comfort rounds  - Make Fall Risk Sign visible to staff  - Offer Toileting every  Hours, in advance of need  - Initiate/Maintain alarm  - Obtain necessary fall risk management equipment:   - Apply yellow socks and bracelet for high fall risk patients  - Consider moving patient to room near nurses station  Outcome: Progressing     Problem: DISCHARGE PLANNING  Goal: Discharge to home or other facility with appropriate resources  Description: INTERVENTIONS:  - Identify barriers to discharge w/patient and caregiver  - Arrange for needed discharge resources and transportation as appropriate  - Identify discharge learning needs (meds, wound care, etc )  - Arrange for interpretive services to assist at discharge as needed  - Refer to Case Management Department for coordinating discharge planning if the patient needs post-hospital services based on physician/advanced practitioner order or complex needs related to functional status, cognitive ability, or social support system  Outcome: Progressing     Problem: Knowledge Deficit  Goal: Patient/family/caregiver demonstrates understanding of disease process, treatment plan, medications, and discharge instructions  Description: Complete learning assessment and assess knowledge base    Interventions:  - Provide teaching at level of understanding  - Provide teaching via preferred learning methods  Outcome: Progressing     Problem: Prexisting or High Potential for Compromised Skin Integrity  Goal: Skin integrity is maintained or improved  Description: INTERVENTIONS:  - Identify patients at risk for skin breakdown  - Assess and monitor skin integrity  - Assess and monitor nutrition and hydration status  - Monitor labs   - Assess for incontinence   - Turn and reposition patient  - Assist with mobility/ambulation  - Relieve pressure over bony prominences  - Avoid friction and shearing  - Provide appropriate hygiene as needed including keeping skin clean and dry  - Evaluate need for skin moisturizer/barrier cream  - Collaborate with interdisciplinary team   - Patient/family teaching  - Consider wound care consult   Outcome: Progressing     Problem: Nutrition/Hydration-ADULT  Goal: Nutrient/Hydration intake appropriate for improving, restoring or maintaining nutritional needs  Description: Monitor and assess patient's nutrition/hydration status for malnutrition  Collaborate with interdisciplinary team and initiate plan and interventions as ordered  Monitor patient's weight and dietary intake as ordered or per policy  Utilize nutrition screening tool and intervene as necessary  Determine patient's food preferences and provide high-protein, high-caloric foods as appropriate       INTERVENTIONS:  - Monitor oral intake, urinary output, labs, and treatment plans  - Assess nutrition and hydration status and recommend course of action  - Evaluate amount of meals eaten  - Assist patient with eating if necessary   - Allow adequate time for meals  - Recommend/ encourage appropriate diets, oral nutritional supplements, and vitamin/mineral supplements  - Order, calculate, and assess calorie counts as needed  - Recommend, monitor, and adjust tube feedings and TPN/PPN based on assessed needs  - Assess need for intravenous fluids  - Provide specific nutrition/hydration education as appropriate  - Include patient/family/caregiver in decisions related to nutrition  Outcome: Progressing     Problem: RESPIRATORY - ADULT  Goal: Achieves optimal ventilation and oxygenation  Description: INTERVENTIONS:  - Assess for changes in respiratory status  - Assess for changes in mentation and behavior  - Position to facilitate oxygenation and minimize respiratory effort  - Oxygen administered by appropriate delivery if ordered  - Initiate smoking cessation education as indicated  - Encourage broncho-pulmonary hygiene including cough, deep breathe, Incentive Spirometry  - Assess the need for suctioning and aspirate as needed  - Assess and instruct to report SOB or any respiratory difficulty  - Respiratory Therapy support as indicated  Outcome: Progressing

## 2023-02-28 NOTE — CASE MANAGEMENT
Case Management Discharge Planning Note    Patient name Chacorta Rai    Location /-01 MRN [id number]  : 1955 Date 2023       Current Admission Date: 2023  Current Admission Diagnosis:Chronic obstructive pulmonary disease [facility])   Patient Active Problem List    Diagnosis Date Noted   • COVID-19 virus infection 2023   • Physical deconditioning 2023   • Anemia 2023   • Acute on chronic HFrEF (heart failure with reduced ejection fraction) ([facility] ) 2022   • Protein-calorie malnutrition ([facility] ) 2022   • Pulmonary nodules/lesions, multiple 2022   • Prostate cancer ([place] [address] ) 2021   • BPH with obstruction/lower urinary tract symptoms 2021   • Acute on chronic respiratory failure with hypercapnia ([facility] ) 2020   • Macrocytosis without anemia 2019   • Other insomnia 2019   • Gastroesophageal reflux disease 2017   • Nonobstructive atherosclerosis of coronary artery 2016   • Mood disorder ([facility] ) 2015   • Impaired fasting glucose 2015   • Osteoporosis 10/14/2014   • Vitamin D deficiency 10/14/2014   • Nonischemic cardiomyopathy ([facility] ) 2014   • Left bundle-branch block 2013   • Osteoarthritis 2013   • KEVIN and COPD overlap syndrome ([place] [address] ) 2013   • Chronic obstructive pulmonary disease ([facility] ) 2012      LOS (days): 16  Geometric Mean LOS (GMLOS) (days): 5 00  Days to GMLOS:-11 2     OBJECTIVE:  Risk of Unplanned Readmission Score: 29 45         Current admission status: Inpatient   Preferred Pharmacy:   [id number]  [facility] [place] [address]  Phone: [phone] Fax: [phone]    Primary Care Provider: Benjy Galvan DO    Primary Insurance: BLUE CROSS  Secondary Insurance: MEDICARE    DISCHARGE DETAILS:    Discharge planning discussed with[de-identified] Hemanth Rubio & Yennifer John     Comments - [place] of Choice: CE notified all the above mentioned individuals of the Pt's d/c to [facility] today  CM made a roundtrip referral for a bls transport  Contacts  Patient Contacts: Carlos Naval  Relationship to Patient[de-identified] Family  Contact Method: Phone  Phone Number: [phone]  Reason/Outcome: Discharge [address]         Is the patient interested in [facility] at discharge?: No    DME Referral Provided  Referral made for DME?: No            Transport at Discharge : [facility]     Number/Name of Dispatcher: Sabas Lewis [id number]  Transported by [employer] and Unit #):  SLELUIS M  ETA of Transport (Date): 02/28/23  ETA of Transport (Time): 1300        IMM Given (Date):: 02/28/23  IMM Given to[de-identified] Patient (Review with PT and spouse via phone due to his COVID status)

## 2023-03-01 DIAGNOSIS — I50.23 ACUTE ON CHRONIC SYSTOLIC (CONGESTIVE) HEART FAILURE (HCC): ICD-10-CM

## 2023-03-01 RX ORDER — FUROSEMIDE 20 MG/1
TABLET ORAL
Qty: 90 TABLET | Refills: 0 | Status: SHIPPED | OUTPATIENT
Start: 2023-03-01

## 2023-03-01 NOTE — UTILIZATION REVIEW
NOTIFICATION OF ADMISSION DISCHARGE   This is a Notification of Discharge from [address]  Please be advised that this patient has been discharge from our facility  Below you will find the admission and discharge date and time including the patient’s disposition  UTILIZATION REVIEW CONTACT:  Gary Rowan  Utilization   Network Utilization Review Department  Phone: [phone] x carefully listen to the prompts  All voicemails are confidential   Email: [email or web address]     ADMISSION INFORMATION  PRESENTATION DATE: 2/12/2023  5:07 AM  OBERVATION ADMISSION DATE:  INPATIENT ADMISSION DATE: 2/12/23  6:31 AM   DISCHARGE DATE: 2/28/2023  1:25 PM   DISPOSITION:Non [facility] Acute Rehab    IMPORTANT INFORMATION:  Send all requests for admission clinical reviews, approved or denied determinations and any other requests to dedicated fax number below belonging to the campus where the patient is receiving treatment   List of dedicated fax numbers:  [address] DENIALS (Administrative/Medical Necessity) [phone]   [address] (Maternity/NICU/Pediatrics) [phone]   [facility] [phone]   [address] [phone]   [address] [phone]   220 [facility] [phone]   90 [facility] [phone]   [facility] 119 [phone]   [facility]  [phone] 4058 [facility] [phone]   412 [facility] 850 [address] [facility] [phone]

## 2023-03-07 ENCOUNTER — TRANSITIONAL CARE MANAGEMENT (OUTPATIENT)
Dept: INTERNAL MEDICINE CLINIC | Facility: CLINIC | Age: 68
End: 2023-03-07

## 2023-03-10 ENCOUNTER — HOME HEALTH ADMISSION (OUTPATIENT)
Dept: HOME HEALTH SERVICES | Facility: HOME HEALTHCARE | Age: 68
End: 2023-03-10

## 2023-03-10 ENCOUNTER — TRANSCRIBE ORDERS (OUTPATIENT)
Dept: HOME HEALTH SERVICES | Facility: HOME HEALTHCARE | Age: 68
End: 2023-03-10

## 2023-03-10 DIAGNOSIS — J96.22 ACUTE ON CHRONIC RESPIRATORY FAILURE WITH HYPERCAPNIA (HCC): Primary | ICD-10-CM

## 2023-03-14 ENCOUNTER — HOME CARE VISIT (OUTPATIENT)
Dept: HOME HEALTH SERVICES | Facility: HOME HEALTHCARE | Age: 68
End: 2023-03-14

## 2023-03-14 NOTE — CASE COMMUNICATION
SN called patient on 3/14 spoke to spouse and patient about [facility] plan for 3/15/23 per patient he doesnt feel need for any [facility] services  SN called pcp office left message and attached note as well to notify of refusal of [facility] HH   Please end date episode   TY

## 2023-03-17 ENCOUNTER — RA CDI HCC (OUTPATIENT)
Dept: OTHER | Facility: HOSPITAL | Age: 68
End: 2023-03-17

## 2023-03-17 ENCOUNTER — TRANSITIONAL CARE MANAGEMENT (OUTPATIENT)
Dept: INTERNAL MEDICINE CLINIC | Facility: CLINIC | Age: 68
End: 2023-03-17

## 2023-03-17 NOTE — PROGRESS NOTES
[facility]  coding opportunities       Chart reviewed, no opportunity found: CHART REVIEWED, NO OPPORTUNITY FOUND        Patients Insurance        Commercial Insurance: [address] [place] [address]

## 2023-03-23 ENCOUNTER — OFFICE VISIT (OUTPATIENT)
Dept: INTERNAL MEDICINE CLINIC | Facility: CLINIC | Age: 68
End: 2023-03-23

## 2023-03-23 VITALS
WEIGHT: 101 LBS | SYSTOLIC BLOOD PRESSURE: 100 MMHG | HEIGHT: 60 IN | DIASTOLIC BLOOD PRESSURE: 70 MMHG | HEART RATE: 75 BPM | RESPIRATION RATE: 16 BRPM | BODY MASS INDEX: 19.83 KG/M2 | OXYGEN SATURATION: 100 % | TEMPERATURE: 96.7 F

## 2023-03-23 DIAGNOSIS — E87.1 HYPONATREMIA: ICD-10-CM

## 2023-03-23 DIAGNOSIS — N13.8 BPH WITH OBSTRUCTION/LOWER URINARY TRACT SYMPTOMS: Chronic | ICD-10-CM

## 2023-03-23 DIAGNOSIS — G47.09 OTHER INSOMNIA: ICD-10-CM

## 2023-03-23 DIAGNOSIS — J96.22 ACUTE ON CHRONIC RESPIRATORY FAILURE WITH HYPERCAPNIA (HCC): ICD-10-CM

## 2023-03-23 DIAGNOSIS — I50.23 ACUTE ON CHRONIC HFREF (HEART FAILURE WITH REDUCED EJECTION FRACTION) (HCC): Primary | ICD-10-CM

## 2023-03-23 DIAGNOSIS — S31.000D WOUND OF SACRAL REGION, SUBSEQUENT ENCOUNTER: ICD-10-CM

## 2023-03-23 DIAGNOSIS — N40.1 BPH WITH OBSTRUCTION/LOWER URINARY TRACT SYMPTOMS: Chronic | ICD-10-CM

## 2023-03-23 NOTE — PROGRESS NOTES
Assessment/Plan:    Problem List Items Addressed This Visit        Respiratory    Acute on chronic respiratory failure with hypercapnia (HCC)     -secondary to COPD exac, complicated by COVID  -returned to baseline 3L 02 continuously  -follow up with Pulm            Cardiovascular and Mediastinum    Acute on chronic HFrEF (heart failure with reduced ejection fraction) (HCC) - Primary     -furosemide reduced to 10mg daily due to hyponatremia at rehab  -has trace BLE edema  -reinforced low sodium diet and fluid restriction  -monitor BMP              Genitourinary    BPH with obstruction/lower urinary tract symptoms (Chronic)     -s/p TURP  -due to low BP, will hold off on flomax            Other    Other insomnia     -difficulty falling asleep  -melatonin restarted at rehab, 5mg qhs        Other Visit Diagnoses     Wound of sacral region, subsequent encounter        -noted during stay at rehab  -nearly healed  -cont topical agent    Hyponatremia        -secondary to volume depletion  -now on lowered dose of furosemide 10mg daily  -needs updated BMP, slip in chart          Subjective:      Patient ID: Carlus Dancer  is a 74 y o  male  HPI  TCM Call     Date and time call was made  3/17/2023 12:33 PM    Hospital care reviewed  Records reviewed    Patient was hospitialized at  Other (comment)    Comment  [facility]  rehab    Date of Admission  02/28/23    Date of discharge  03/14/23    Diagnosis  gait    Disposition  Home    Were the patients medications reviewed and updated  Yes    Current Symptoms  Fatigue    Shortness of breath severity  Mild    Fatigue severity  Mild      TCM Call     Post hospital issues  None    Should patient be enrolled in anticoag monitoring? No    Scheduled for follow up?   No    Did you obtain your prescribed medications  Yes    Do you need help managing your prescriptions or medications  No    Is transportation to your appointment needed  No    I have advised the patient to call PCP with any new or worsening symptoms  bruce grover    Living Arrangements  Spouse or Significiant other    Support System  None    The type of support provided  None    Do you have social support  Yes, as much as I need    Are you recieving any outpatient services  No    Are you recieving home care services  No  patient refused    Are you using any community resources  No    Current waiver services  No    Have you fallen in the last 12 months  No    Interperter language line needed  No    Counseling  Family    Counseling topics  Diagnostic results    Comments  appointment scheduled for 4/7        HE was hospitalized [facility] 2/12-2/28/23 for COPD exacerbation as well as CHF exacerbation  He was diuresed and supplemented with oxygen  His hospitalization was complicated by COVID and treated with IV remdesivir  He was discharged to [facility] for rehab until 3/14/2023  Furosemide was decreased to 10mg daily due to hyponatremia  He was started on melatonin 5mg qhs due to difficulty falling asleep  He developed a sacral wound and applying ointment on it  Blood pressures have been labile, sometimes 88/50 and feels ok  Breathing is the same as usual, he does not over exert himself  He declined [facility] [place] upon return home      The following portions of the patient's history were reviewed and updated as appropriate: allergies, current medications, past family history, past medical history, past social history, past surgical history and problem list       Current Outpatient Medications:   •  acetaminophen (TYLENOL) 325 mg tablet, Take 3 tablets (975 mg total) by mouth every 8 (eight) hours as needed for mild pain or moderate pain, Disp: , Rfl: 0  •  albuterol (PROVENTIL HFA,VENTOLIN HFA) 90 mcg/act inhaler, TAKE 2 PUFFS BY MOUTH EVERY 6 HOURS AS NEEDED FOR WHEEZE OR FOR SHORTNESS OF BREATH, Disp: 8 5 g, Rfl: 11  •  aspirin 81 mg chewable tablet, Chew 1 tablet (81 mg total) daily, Disp: 30 tablet, Rfl: 0  • budesonide (Pulmicort) 0 5 mg/2 mL nebulizer solution, Take 2 mL (0 5 mg total) by nebulization 2 (two) times a day Rinse mouth after use , Disp: 120 mL, Rfl: 5  •  cyanocobalamin (VITAMIN B-12) 1000 MCG tablet, Take 1 tablet (1,000 mcg total) by mouth daily Do not start before March 1, 2023 , Disp: , Rfl: 0  •  ergocalciferol (VITAMIN D2) 50,000 units, TAKE 1 CAPSULE (50,000 UNITS TOTAL) BY MOUTH EVERY 28 DAYS, Disp: 3 capsule, Rfl: 1  •  escitalopram (LEXAPRO) 5 mg tablet, Take 1 tablet (5 mg total) by mouth daily Do not start before March 1, 2023 , Disp: , Rfl: 0  •  ferrous sulfate 325 (65 Fe) mg tablet, Take 1 tablet (325 mg total) by mouth every other day Do not start before March 1, 2023 , Disp: , Rfl: 0  •  formoterol (PERFOROMIST) 20 MCG/2ML nebulizer solution, Take 2 mL (20 mcg total) by nebulization 2 (two) times a day, Disp: 120 mL, Rfl: 0  •  furosemide (LASIX) 20 mg tablet, One tablet daily as needed, Disp: 90 tablet, Rfl: 0  •  guaiFENesin (MUCINEX) 600 mg 12 hr tablet, Take 2 tablets (1,200 mg total) by mouth every 12 (twelve) hours, Disp: 60 tablet, Rfl: 6  •  ipratropium (ATROVENT) 0 02 % nebulizer solution, Take 2 5 mL (0 5 mg total) by nebulization 3 (three) times a day, Disp: , Rfl: 0  •  levalbuterol (XOPENEX) 1 25 mg/0 5 mL nebulizer solution, Take 0 5 mL (1 25 mg total) by nebulization 3 (three) times a day, Disp: , Rfl: 0  •  losartan (COZAAR) 25 mg tablet, Take 0 5 tablets (12 5 mg total) by mouth daily Do not start before March 1, 2023 , Disp: , Rfl: 0  •  Melatonin 5 MG CAPS, Take 5 mg by mouth, Disp: , Rfl:   •  predniSONE 5 mg tablet, Take 1 tablet (5 mg total) by mouth daily Do not start before March 5, 2023 , Disp: , Rfl: 0  •  rosuvastatin (CRESTOR) 10 MG tablet, TAKE 1 TABLET BY MOUTH EVERY DAY, Disp: 90 tablet, Rfl: 3  •  senna-docusate sodium (SENOKOT S) 8 6-50 mg per tablet, Take 1 tablet by mouth daily at bedtime, Disp: , Rfl: 0  •  tamsulosin (FLOMAX) 0 4 mg, Take 1 capsule (0 4 mg total) by mouth daily with dinner, Disp: , Rfl: 0  •  ERROR: CANNOT USE RATIO BASED PRESCRIPTION MIXTURE NAMING FOR A NON-MIXTURE, Take 10 mL (20 mg total) by mouth daily Do not start before March 1, 2023 , Disp: , Rfl: 0    Review of Systems   Constitutional: Positive for activity change  HENT: Negative for congestion, postnasal drip and rhinorrhea  Respiratory: Positive for cough, shortness of breath and wheezing  Cardiovascular: Negative for chest pain, palpitations and leg swelling  Genitourinary: Negative for difficulty urinating  Skin: Positive for wound  Neurological: Positive for headaches  Negative for dizziness  Objective:    /70   Pulse 75   Temp (!) 96 7 °F (35 9 °C)   Resp 16   Ht 5' (1 524 m)   Wt 45 8 kg (101 lb)   SpO2 100%   BMI 19 73 kg/m²      Physical Exam  Vitals reviewed  HENT:      Nose:      Comments: Wears NC  Cardiovascular:      Rate and Rhythm: Normal rate and regular rhythm  Pulses: Normal pulses  Heart sounds: Normal heart sounds  Comments: Distant heart sounds  Pulmonary:      Effort: Pulmonary effort is normal  No respiratory distress  Breath sounds: Normal breath sounds  No wheezing  Comments: Decreased BS  Musculoskeletal:      Right lower leg: Edema (trace) present  Left lower leg: Edema (trace) present  Skin:     Comments: Healing wound at coccyx, nontender   Neurological:      Mental Status: He is alert and oriented to person, place, and time

## 2023-03-23 NOTE — ASSESSMENT & PLAN NOTE
-secondary to COPD exac, complicated by COVID  -returned to baseline 3L 02 continuously  -follow up with Pulm

## 2023-03-23 NOTE — ASSESSMENT & PLAN NOTE
-furosemide reduced to 10mg daily due to hyponatremia at rehab  -has trace BLE edema  -reinforced low sodium diet and fluid restriction  -monitor BMP

## 2023-03-24 DIAGNOSIS — J44.9 CHRONIC OBSTRUCTIVE PULMONARY DISEASE, UNSPECIFIED COPD TYPE (HCC): ICD-10-CM

## 2023-03-24 RX ORDER — ALBUTEROL SULFATE 90 UG/1
2 AEROSOL, METERED RESPIRATORY (INHALATION) EVERY 6 HOURS PRN
Qty: 8.5 G | Refills: 1 | Status: SHIPPED | OUTPATIENT
Start: 2023-03-24 | End: 2023-03-30 | Stop reason: SDUPTHER

## 2023-03-24 NOTE — PROGRESS NOTES
Heart Failure Outpatient Progress Note - Jordan Monday  74 y o  male MRN: [id number]    @ Encounter: [id number]      Assessment/Plan:    Patient Active Problem List    Diagnosis Date Noted   • COVID-19 virus infection 02/23/2023   • Physical deconditioning 02/21/2023   • Anemia 02/17/2023   • Acute on chronic HFrEF (heart failure with reduced ejection fraction) ([facility] ) 09/12/2022   • Protein-calorie malnutrition ([facility] ) 08/27/2022   • Pulmonary nodules/lesions, multiple 03/27/2022   • Prostate cancer ([place] [address] ) 05/24/2021   • BPH with obstruction/lower urinary tract symptoms 04/13/2021   • Acute on chronic respiratory failure with hypercapnia ([place] [address] ) 11/19/2020   • Macrocytosis without anemia 05/23/2019   • Other insomnia 02/18/2019   • Gastroesophageal reflux disease 01/05/2017   • Nonobstructive atherosclerosis of coronary artery 02/25/2016   • Mood disorder ([place] [address] ) 08/18/2015   • Impaired fasting glucose 02/19/2015   • Osteoporosis 10/14/2014   • Vitamin D deficiency 10/14/2014   • Nonischemic cardiomyopathy ([place] [address] ) 09/26/2014   • Left bundle-branch block 08/03/2013   • Osteoarthritis 08/03/2013   • KEVIN and COPD overlap syndrome ([place] [address] ) 07/29/2013   • Chronic obstructive pulmonary disease ([facility] ) 07/18/2012     Plan:  Chronic HFrEF; LVEF 20-25%; Crystal Sumner; ACC/AHA Stage C Etiology: Nonischemic cardiomyopathy   Possible dyssynchrony from chronic LBBB   Despite QRS only being 120 ms, echo shows significant dyssynchrony  Recent drop in EF likely related to stress myopathy with acute exacerbation of COPD requiring intubation  Mild volume overload on exam today  Will increase Lasix to BID x 3 days  Check post hospital BMP  Follow up via telephone by end of the week  Weight at discharge 98 lbs, today 102 lbs       TTE 2/21/23: LVEF 30%  LVIDd 6 6cm  severe global hypokinesis  Grade I DD   RV cavity size and systolic function normal  Mild TR    TTE 2/12/2023: LVEF 20-25%   Severe global hypokinesis with regional variation   Grade 1 DD   Abnormal septal motion consistent with LBBB   RV cavity mildly dilated, normal systolic function   Mild MR, mild TR   RVSP 38   Dilated IVC  TTE 8/26/2022: LVEF 40%   RV cavity mildly dilated   Systolic function normal   Mild MR, TR   Normal IVC      Neurohormonal Blockade:  --Beta Blocker: none 2/2 c/f bronchoconstriction  --ARNi / ACEi / ARB: losartan 12 5 mg QD  --Aldosterone Antagonist: future maybe   --SGLT2 Inhibitor: no  --Home Diuretic: Lasix 20 mg daily, increase to BID x 3 days         Sudden Cardiac Death Risk Reduction:  --ICD: LVEF newly reduced to 20-25%     Electrical Resynchronization:  --Candidacy for BiV device: QRSd 120ms, chronic LBBB with dyssynchrony on echocardiogram      Advanced Therapies (if appropriate): Not appropriate at this time, particularly considering advanced lung disease      Chronic hyponatremia  COVID-19  Tested positive on 2/22/23  Nonobstructive CAD  Has coronary calcium on CT  On aspirin and statin  Hyperlipidemia  Continue statin  LDL 75 8/2021  COPD (end stage)  Evaluated previously in [facility]  Not a candidate for transplant at this time  Former smoker; 105 pack years, quit in 2012  Severe disease, follows closely with pulm   On home oxygen, 3-4L  Hospitalized with acute COPD exacerbation requiring intubation, now extubated  Management per pulm  KEVIN   On BiPAP nightly  GOC  Introduced to palliative care team this past admit  Discussed again today  Offered outpatient follow up  Will think about it and discuss further with wife  HPI:  Eshan Cruz   is a 74year-old male who presented to [facility] on 2/12/2023 with an acute COPD exacerbation requiring intubation  Extubated 2/15/23   PMH includes nonischemic cardiomyopathy, chronic systolic and diastolic heart failure, nonobstructive CAD, hyperlipidemia, severe COPD, KVEIN   Was previously admitted in 8/2022 for acute decompensated heart failure and COPD exacerbation    LVEF 40% at that time, down from prior of 45 to 50%, though previously EF had been 35% for years   Started on Lasix at that time  Malik Andre been euvolemic, though blood pressures have made it difficult to start/titrate up on GDMT   Recently saw Dr Lupe King in the office in December, at which time his Lasix was shifted to as needed and adequate oral intake and hydration were encouraged      He was reportedly short of breath for 2 days prior to presentation, with hallucinations   His wife called 911 and EMS intubated him in the field  Darvin Kunz was found to have an elevated procalcitonin on admission and started on azithromycin and ceftriaxone   He was given IV Lasix with good urine output   He was weaned off the ventilator and extubated on 2/15   Maintained on BiPAP overnight and has been receiving scheduled nebulizers with aggressive pulmonary hygiene  TTE on admission with LVEF 20 to 25%, down from 40% in 8/2022       He is also followed by pulmonology, who recommended consideration for home hospice for severe Dave Ranks was seen by palliative care while inpatient here, and patient and family elected to make code status level 3 DNR/DNI   Pulmonology has been following him while inpatient, and has been managing steroids, bronchodilators, and nebulizer therapy  Tested positive for COVID on 2/22  Managed by primary team and treated with Remdesivir  Given PRN diuretics by our team  Was discharged to [address] [place] [address] for [facility]     3/7/23  Presents for post hospital follow up with his wife  Just discharged from [facility]  Feeling at his baseline  Gets SOB with minimal exertion  O2 sats in the low 80's on 3-4 L NC,  on initial assessment today  Has complaints that his feet are red and swollen today  Wants to keep fighting  Dreading another hospital admission       Past Medical History:   Diagnosis Date   • Acute metabolic encephalopathy 4/05/7044   • Arthritis    • Bladder mass    • Cardiomyopathy [facility])    • Chest pain    • COPD (chronic obstructive pulmonary disease) ([facility] )    • CPAP (continuous positive airway pressure) dependence    • Emphysema of lung (HCC)    • Hypoxia     nocturnal   • Left bundle branch block    • Multiple pulmonary nodules     last assessed: 10/12/16   • Pneumonia    • Sleep apnea    • Sleep apnea, obstructive    • Smoker    • Weight loss 8/29/2019       12 point ROS negative other than that stated in HPI    No Known Allergies        Current Outpatient Medications:   •  acetaminophen (TYLENOL) 325 mg tablet, Take 3 tablets (975 mg total) by mouth every 8 (eight) hours as needed for mild pain or moderate pain, Disp: , Rfl: 0  •  albuterol (PROVENTIL HFA,VENTOLIN HFA) 90 mcg/act inhaler, Inhale 2 puffs every 6 (six) hours as needed for wheezing, Disp: 8 5 g, Rfl: 1  •  aspirin 81 mg chewable tablet, Chew 1 tablet (81 mg total) daily, Disp: 30 tablet, Rfl: 0  •  budesonide (Pulmicort) 0 5 mg/2 mL nebulizer solution, Take 2 mL (0 5 mg total) by nebulization 2 (two) times a day Rinse mouth after use , Disp: 120 mL, Rfl: 5  •  cyanocobalamin (VITAMIN B-12) 1000 MCG tablet, Take 1 tablet (1,000 mcg total) by mouth daily Do not start before March 1, 2023 , Disp: , Rfl: 0  •  ergocalciferol (VITAMIN D2) 50,000 units, TAKE 1 CAPSULE (50,000 UNITS TOTAL) BY MOUTH EVERY 28 DAYS, Disp: 3 capsule, Rfl: 1  •  ERROR: CANNOT USE RATIO BASED PRESCRIPTION MIXTURE NAMING FOR A NON-MIXTURE, Take 10 mL (20 mg total) by mouth daily Do not start before March 1, 2023 , Disp: , Rfl: 0  •  escitalopram (LEXAPRO) 5 mg tablet, Take 1 tablet (5 mg total) by mouth daily Do not start before March 1, 2023 , Disp: , Rfl: 0  •  ferrous sulfate 325 (65 Fe) mg tablet, Take 1 tablet (325 mg total) by mouth every other day Do not start before March 1, 2023 , Disp: , Rfl: 0  •  formoterol (PERFOROMIST) 20 MCG/2ML nebulizer solution, Take 2 mL (20 mcg total) by nebulization 2 (two) times a day, Disp: 120 mL, Rfl: 0  •  furosemide (LASIX) 20 mg tablet, One tablet daily as needed, Disp: 90 tablet, Rfl: 0  •  guaiFENesin (MUCINEX) 600 mg 12 hr tablet, Take 2 tablets (1,200 mg total) by mouth every 12 (twelve) hours, Disp: 60 tablet, Rfl: 6  •  ipratropium (ATROVENT) 0 02 % nebulizer solution, Take 2 5 mL (0 5 mg total) by nebulization 3 (three) times a day, Disp: , Rfl: 0  •  levalbuterol (XOPENEX) 1 25 mg/0 5 mL nebulizer solution, Take 0 5 mL (1 25 mg total) by nebulization 3 (three) times a day, Disp: , Rfl: 0  •  losartan (COZAAR) 25 mg tablet, Take 0 5 tablets (12 5 mg total) by mouth daily Do not start before 2023 , Disp: , Rfl: 0  •  Melatonin 5 MG CAPS, Take 5 mg by mouth, Disp: , Rfl:   •  predniSONE 5 mg tablet, Take 1 tablet (5 mg total) by mouth daily Do not start before 2023 , Disp: , Rfl: 0  •  rosuvastatin (CRESTOR) 10 MG tablet, TAKE 1 TABLET BY MOUTH EVERY DAY, Disp: 90 tablet, Rfl: 3  •  senna-docusate sodium (SENOKOT S) 8 6-50 mg per tablet, Take 1 tablet by mouth daily at bedtime, Disp: , Rfl: 0  •  tamsulosin (FLOMAX) 0 4 mg, Take 1 capsule (0 4 mg total) by mouth daily with dinner, Disp: , Rfl: 0    Social History     Socioeconomic History   • Marital status: /Civil Union     Spouse name: Not on file   • Number of children: Not on file   • Years of education: Not on file   • Highest education level: Not on file   Occupational History   • Not on file   Tobacco Use   • Smoking status: Former     Packs/day: 2 50     Years: 42 00     Pack years: 105 00     Types: Cigarettes     Start date: 5     Quit date:      Years since quittin 2   • Smokeless tobacco: Former   • Tobacco comments:     1 ppd for 37 years, 2010 down to 5 cigs a day, is around second hand smoke   Vaping Use   • Vaping Use: Never used   Substance and Sexual Activity   • Alcohol use: Not Currently     Comment: rarely   • Drug use: No   • Sexual activity: Not Currently     Partners: Female   Other Topics Concern   • Not on file   Social History Narrative    Daily coffee consumption: 8 or more cups a day        Used to work in [employer]  On disability  Social Determinants of Health     Financial Resource Strain: Not on file   Food Insecurity: No Food Insecurity   • Worried About [employer] in the Last Year: Never true   • Ran Out of Food in the Last Year: Never true   Transportation Needs: No Transportation Needs   • Lack of Transportation (Medical): No   • Lack of Transportation (Non-Medical):  No   Physical Activity: Not on file   Stress: Not on file   Social Connections: Not on file   Intimate Partner Violence: Not on file   Housing Stability: Unknown   • Unable to Pay for Housing in the Last Year: No   • Number of Places Lived in the Last Year: Not on file   • Unstable Housing in the Last Year: No       Family History   Problem Relation Age of Onset   • Diabetes Mother        Physical Exam:    Vitals: /60 (BP Location: Right arm, Patient Position: Sitting, Cuff Size: Standard)   Pulse 71   Wt 46 3 kg (102 lb)   SpO2 (!) 81%   BMI 19 92 kg/m²     Wt Readings from Last 3 Encounters:   03/23/23 45 8 kg (101 lb)   02/28/23 44 8 kg (98 lb 12 3 oz)   01/17/23 48 1 kg (106 lb)           GEN: Valeri Smith  is ill appearing, frail  HEENT: pupils equal, round, and reactive to light; extraocular muscles intact  NECK: supple, no carotid bruits   HEART: regular rhythm, normal S1 and S2, no murmurs, clicks, gallops or rubs, JVP is  Slightly up  LUNGS:severely diminished  ABDOMEN: normal bowel sounds, soft, no tenderness, no distention  EXTREMITIES: peripheral pulses normal; no clubbing, cyanosis, +1 BLLE edema  NEURO: no focal findings   SKIN: normal without suspicious lesions on exposed skin    Labs & Results:    Chemistry        Component Value Date/Time     08/17/2015 0756    K 3 5 02/28/2023 0359    K 3 9 08/17/2015 0756    CL 91 (L) 02/28/2023 0359     (H) 08/17/2015 0756    CO2 35 (H) 02/28/2023 0359    CO2 30 08/17/2015 0756    BUN 18 02/28/2023 0359    BUN 13 08/17/2015 0756    CREATININE 0 39 (L) 02/28/2023 0359    CREATININE 1 04 08/17/2015 0756        Component Value Date/Time    CALCIUM 8 2 (L) 02/28/2023 0359    CALCIUM 8 8 08/17/2015 0756    ALKPHOS 44 (L) 02/16/2023 0447    ALKPHOS 75 08/17/2015 0756    AST 35 02/16/2023 0447    AST 16 08/17/2015 0756    ALT 29 02/16/2023 0447    ALT 23 08/17/2015 0756    BILITOT 0 60 08/17/2015 0756        Lab Results   Component Value Date    WBC 8 04 02/28/2023    HGB 7 7 (L) 02/28/2023    HCT 23 7 (L) 02/28/2023    MCV 99 (H) 02/28/2023     02/28/2023     Lab Results   Component Value Date    NTBNP 6,786 (H) 02/12/2023      Lab Results   Component Value Date    LDLCALC 75 08/19/2021       EKG personally reviewed by DELIA Saravia  No results found for this visit on 03/27/23  Counseling / Coordination of Care  Total floor / unit time spent today 40 minutes  Greater than 50% of total time was spent with the patient and / or family counseling and / or coordination of care  A description of the counseling / coordination of care: 20  Thank you for the opportunity to participate in the care of this patient      Elver Montoya

## 2023-03-27 ENCOUNTER — OFFICE VISIT (OUTPATIENT)
Dept: CARDIOLOGY CLINIC | Facility: CLINIC | Age: 68
End: 2023-03-27

## 2023-03-27 VITALS
HEART RATE: 71 BPM | DIASTOLIC BLOOD PRESSURE: 60 MMHG | BODY MASS INDEX: 19.92 KG/M2 | WEIGHT: 102 LBS | SYSTOLIC BLOOD PRESSURE: 100 MMHG | OXYGEN SATURATION: 81 %

## 2023-03-27 DIAGNOSIS — I50.23 ACUTE ON CHRONIC HFREF (HEART FAILURE WITH REDUCED EJECTION FRACTION) (HCC): ICD-10-CM

## 2023-03-27 DIAGNOSIS — I50.23 ACUTE ON CHRONIC SYSTOLIC (CONGESTIVE) HEART FAILURE (HCC): ICD-10-CM

## 2023-03-27 DIAGNOSIS — J43.9 PULMONARY EMPHYSEMA, UNSPECIFIED EMPHYSEMA TYPE (HCC): Chronic | ICD-10-CM

## 2023-03-27 RX ORDER — ROSUVASTATIN CALCIUM 10 MG/1
10 TABLET, COATED ORAL DAILY
Qty: 90 TABLET | Refills: 3 | Status: SHIPPED | OUTPATIENT
Start: 2023-03-27

## 2023-03-27 RX ORDER — CHOLECALCIFEROL (VITAMIN D3) 125 MCG
1 CAPSULE ORAL
COMMUNITY
Start: 2023-03-13

## 2023-03-27 RX ORDER — LOSARTAN POTASSIUM 25 MG/1
12.5 TABLET ORAL DAILY
Qty: 30 TABLET | Refills: 2 | Status: SHIPPED | OUTPATIENT
Start: 2023-03-27

## 2023-03-27 RX ORDER — FUROSEMIDE 40 MG/1
TABLET ORAL
COMMUNITY
Start: 2023-03-01 | End: 2023-03-27

## 2023-03-27 RX ORDER — FUROSEMIDE 20 MG/1
20 TABLET ORAL DAILY
Qty: 90 TABLET | Refills: 0 | Status: SHIPPED | OUTPATIENT
Start: 2023-03-27 | End: 2023-03-31

## 2023-03-27 RX ORDER — IPRATROPIUM BROMIDE AND ALBUTEROL SULFATE 2.5; .5 MG/3ML; MG/3ML
3 SOLUTION RESPIRATORY (INHALATION) 3 TIMES DAILY
COMMUNITY
Start: 2023-03-13

## 2023-03-27 RX ORDER — DOCUSATE SODIUM 100 MG/1
100 CAPSULE, LIQUID FILLED ORAL 2 TIMES DAILY
COMMUNITY
Start: 2023-03-13

## 2023-03-27 NOTE — PATIENT INSTRUCTIONS
Weight yourself daily  If you gain 3 lbs in one day or 5 lbs in one week, please call the office at [phone] and ask for a nurse or the heart failure nurse  Keep your sodium intake to <2 grams, (2000 mg) per day, and fluids <2 Liters (2000 ml) per day  This is around 6-7, 8 oz glasses of fluid per day    Increase Lasix to 20 mg twice daily for 3 days  Get lab work this week

## 2023-03-30 ENCOUNTER — OFFICE VISIT (OUTPATIENT)
Dept: PULMONOLOGY | Facility: CLINIC | Age: 68
End: 2023-03-30

## 2023-03-30 ENCOUNTER — APPOINTMENT (OUTPATIENT)
Dept: LAB | Facility: CLINIC | Age: 68
End: 2023-03-30

## 2023-03-30 VITALS
OXYGEN SATURATION: 94 % | DIASTOLIC BLOOD PRESSURE: 58 MMHG | SYSTOLIC BLOOD PRESSURE: 100 MMHG | HEART RATE: 100 BPM | TEMPERATURE: 97 F | RESPIRATION RATE: 18 BRPM

## 2023-03-30 DIAGNOSIS — I50.23 ACUTE ON CHRONIC SYSTOLIC (CONGESTIVE) HEART FAILURE (HCC): ICD-10-CM

## 2023-03-30 DIAGNOSIS — J44.9 OSA AND COPD OVERLAP SYNDROME (HCC): Primary | Chronic | ICD-10-CM

## 2023-03-30 DIAGNOSIS — G47.33 OSA AND COPD OVERLAP SYNDROME (HCC): Primary | Chronic | ICD-10-CM

## 2023-03-30 DIAGNOSIS — J44.9 CHRONIC OBSTRUCTIVE PULMONARY DISEASE, UNSPECIFIED COPD TYPE (HCC): ICD-10-CM

## 2023-03-30 DIAGNOSIS — R91.8 PULMONARY NODULES/LESIONS, MULTIPLE: ICD-10-CM

## 2023-03-30 DIAGNOSIS — J96.22 ACUTE ON CHRONIC RESPIRATORY FAILURE WITH HYPERCAPNIA (HCC): ICD-10-CM

## 2023-03-30 DIAGNOSIS — I50.23 ACUTE ON CHRONIC HFREF (HEART FAILURE WITH REDUCED EJECTION FRACTION) (HCC): ICD-10-CM

## 2023-03-30 DIAGNOSIS — J96.21 ACUTE ON CHRONIC RESPIRATORY FAILURE WITH HYPOXIA AND HYPERCAPNIA (HCC): ICD-10-CM

## 2023-03-30 DIAGNOSIS — J96.22 ACUTE ON CHRONIC RESPIRATORY FAILURE WITH HYPOXIA AND HYPERCAPNIA (HCC): ICD-10-CM

## 2023-03-30 PROBLEM — R73.9 HYPERGLYCEMIA: Status: ACTIVE | Noted: 2023-03-30

## 2023-03-30 LAB
ANION GAP SERPL CALCULATED.3IONS-SCNC: 1 MMOL/L (ref 4–13)
BUN SERPL-MCNC: 8 MG/DL (ref 5–25)
CALCIUM SERPL-MCNC: 9.1 MG/DL (ref 8.3–10.1)
CHLORIDE SERPL-SCNC: 95 MMOL/L (ref 96–108)
CO2 SERPL-SCNC: 39 MMOL/L (ref 21–32)
CREAT SERPL-MCNC: 0.67 MG/DL (ref 0.6–1.3)
GFR SERPL CREATININE-BSD FRML MDRD: 100 ML/MIN/1.73SQ M
GLUCOSE P FAST SERPL-MCNC: 124 MG/DL (ref 65–99)
NT-PROBNP SERPL-MCNC: 1511 PG/ML
POTASSIUM SERPL-SCNC: 3.5 MMOL/L (ref 3.5–5.3)
SODIUM SERPL-SCNC: 135 MMOL/L (ref 135–147)

## 2023-03-30 RX ORDER — ALBUTEROL SULFATE 90 UG/1
2 AEROSOL, METERED RESPIRATORY (INHALATION) EVERY 6 HOURS PRN
Qty: 8.5 G | Refills: 11 | Status: SHIPPED | OUTPATIENT
Start: 2023-03-30

## 2023-03-30 NOTE — ASSESSMENT & PLAN NOTE
Wt Readings from Last 3 Encounters:   03/27/23 46 3 kg (102 lb)   03/23/23 45 8 kg (101 lb)   02/28/23 44 8 kg (98 lb 12 3 oz)     Patient follows with Dr Gary Kearns outpatient  He recently had a follow-up appointment and labs were ordered  He will complete his BMP and a BNP after today's appointment  He has mild swelling of his lower extremities and was told to increase his Lasix for 3 days which he is doing  Patient will follow-up with his cardiologist regarding lab results

## 2023-03-30 NOTE — ASSESSMENT & PLAN NOTE
Patient is maintained on 3 L nasal cannula  He has a pulse oximeter at home and is encouraged to keep his saturations greater than 88%

## 2023-03-30 NOTE — ASSESSMENT & PLAN NOTE
His most recent CT of chest completed in December 2022 shows stable pulmonary nodules  Can repeat CT scan in December, or sooner if Dr Gabriella Vivas prefers

## 2023-03-30 NOTE — PROGRESS NOTES
Pulmonary Follow Up Note   Bienvenido Parikh  74 y o  male MRN: [id number]  3/30/2023      Assessment/Plan:     Chronic obstructive pulmonary disease ([facility] )  Patient's COPD is very severe  He has an FEV1 of 22% predicted  He is currently maintained on budesonide/formoterol twice daily and DuoNeb 3 times daily  He is also maintained on prednisone 5 mg daily  His most recent exacerbation was on 2/12 in which she was intubated for 3 days  He was sent to [facility] rehab for 2 weeks after his discharge from the hospital and has since been home for about 2 weeks and has been doing well  Patient does not wish to pursue palliative care for his very severe COPD at this time  KEVIN and COPD overlap syndrome ([facility] )  Has been trying to be compliant with his BiPAP but states that he tosses and turns so much when he sleeps, it is hard for the mask to stay on  He uses an AirCurve 10 VAuto, with an IPAP of 12 cmH2O and EPAP of 8 cmH2O  From 2/28/2023 through 3/29/2023, patient has used his BiPAP 13 out of 30 days, which is only 43% compliance  He has only used his BiPAP for greater than 4 hours 9 of those days, which is 30% compliance  His average usage on days used was 5 hours and 39 minutes  His median leak is 0  And his AHI is 0 5  Unfortunately due to cost, patient is unable to afford trilogy at this time  He also states that since he has been noncompliant with his BiPAP he now has to pay for this out-of-pocket  Acute on chronic respiratory failure with hypercapnia (HCC)  Patient is maintained on 3 L nasal cannula  He has a pulse oximeter at home and is encouraged to keep his saturations greater than 88%  Pulmonary nodules/lesions, multiple  His most recent CT of chest completed in December 2022 shows stable pulmonary nodules  Can repeat CT scan in December, or sooner if Dr Jaciel Mcmillan prefers      Acute on chronic HFrEF (heart failure with reduced ejection fraction) (HCC)  Wt Readings from Last 3 Encounters:   03/27/23 46 3 kg (102 lb)   03/23/23 45 8 kg (101 lb)   02/28/23 44 8 kg (98 lb 12 3 oz)     Patient follows with Dr Josefina Betancourt outpatient  He recently had a follow-up appointment and labs were ordered  He will complete his BMP and a BNP after today's appointment  He has mild swelling of his lower extremities and was told to increase his Lasix for 3 days which he is doing  Patient will follow-up with his cardiologist regarding lab results  Hyperglycemia  The patient states that he had high blood sugars while he was hospitalized  He was placed on insulin while in the hospital   This is most likely secondary to chronic prednisone use  He has a BMP that he will be completing after today's visit  I instructed him to please call his PCP tomorrow regarding results and make a follow-up appointment with her if his glucose is high  He does not currently use insulin outpatient  Visit orders:    Problem List Items Addressed This Visit        Respiratory    Chronic obstructive pulmonary disease ([facility] ) (Chronic)     Patient's COPD is very severe  He has an FEV1 of 22% predicted  He is currently maintained on budesonide/formoterol twice daily and DuoNeb 3 times daily  He is also maintained on prednisone 5 mg daily  His most recent exacerbation was on 2/12 in which she was intubated for 3 days  He was sent to [facility] rehab for 2 weeks after his discharge from the hospital and has since been home for about 2 weeks and has been doing well  Patient does not wish to pursue palliative care for his very severe COPD at this time  Relevant Medications    ipratropium-albuterol (DUO-NEB) 0 5-2 5 mg/3 mL nebulizer solution    albuterol (PROVENTIL HFA,VENTOLIN HFA) 90 mcg/act inhaler    KEVIN and COPD overlap syndrome (HCC) - Primary (Chronic)     Has been trying to be compliant with his BiPAP but states that he tosses and turns so much when he sleeps, it is hard for the mask to stay on    He uses an AirCurve 10 VAuto, with an IPAP of 12 cmH2O and EPAP of 8 cmH2O  From 2/28/2023 through 3/29/2023, patient has used his BiPAP 13 out of 30 days, which is only 43% compliance  He has only used his BiPAP for greater than 4 hours 9 of those days, which is 30% compliance  His average usage on days used was 5 hours and 39 minutes  His median leak is 0  And his AHI is 0 5  Unfortunately due to cost, patient is unable to afford trilogy at this time  He also states that since he has been noncompliant with his BiPAP he now has to pay for this out-of-pocket  Relevant Medications    ipratropium-albuterol (DUO-NEB) 0 5-2 5 mg/3 mL nebulizer solution    albuterol (PROVENTIL HFA,VENTOLIN HFA) 90 mcg/act inhaler    Acute on chronic respiratory failure with hypercapnia (HCC)     Patient is maintained on 3 L nasal cannula  He has a pulse oximeter at home and is encouraged to keep his saturations greater than 88%  Cardiovascular and Mediastinum    Acute on chronic HFrEF (heart failure with reduced ejection fraction) ([facility] )     Wt Readings from Last 3 Encounters:   03/27/23 46 3 kg (102 lb)   03/23/23 45 8 kg (101 lb)   02/28/23 44 8 kg (98 lb 12 3 oz)     Patient follows with Dr Aye Valero outpatient  He recently had a follow-up appointment and labs were ordered  He will complete his BMP and a BNP after today's appointment  He has mild swelling of his lower extremities and was told to increase his Lasix for 3 days which he is doing  Patient will follow-up with his cardiologist regarding lab results  Other    Pulmonary nodules/lesions, multiple     His most recent CT of chest completed in December 2022 shows stable pulmonary nodules  Can repeat CT scan in December, or sooner if Dr Matt Andrade prefers  Return in about 2 months (around 5/30/2023), or if symptoms worsen or fail to improve      History of Present Illness   HPI:  Victor Hugo Garcia  is a 74 y o  male who presents to the office today with his wife for hospital follow up  He was hospitalized from 2/12/23-2/28/23 for acute on chronic respiratory failure secondary to COPD and CHF  He was intubated on 2/12 and extubated on 2/15  At that time, a goals of care discussion was made and the patient was made a level 3 DNR/DNI  Patient was discharged to [facility] where he spent two weeks  He has since been home for about 2 weeks and has been doing well  He has been using his budesonide/formoterol twice daily and duo-nebs throughout the day  He is maintained on 3L nasal cannula  He takes 5 mg of prednisone daily  He has been trying to be more compliant with his BiPAP but state's that he tosses and turns so much, it is hard for him to keep his mask on  Due to noncompliance with his BiPAP, he now needs to pay for it out of pocket  While Mor wishes to remain a DNR/DNI, he is not interested in palliative care at this time  He does not want to start taking morphine  His wife is aware of his wishes  Review of Systems   Constitutional: Negative for activity change, chills, fatigue and fever  HENT: Positive for postnasal drip  Negative for congestion and sore throat  Respiratory: Positive for cough and shortness of breath  Negative for chest tightness and wheezing  Cardiovascular: Positive for leg swelling  Negative for chest pain and palpitations  Genitourinary: Negative for difficulty urinating  All other systems reviewed and are negative        Medical, Family and Social history reviewed and updated as appropriate    Historical Information   Past Medical History:   Diagnosis Date   • Acute metabolic encephalopathy 1/65/5613   • Arthritis    • Bladder mass    • Cardiomyopathy [facility])    • Chest pain    • COPD (chronic obstructive pulmonary disease) ([facility])    • CPAP (continuous positive airway pressure) dependence    • Emphysema of lung ([facility])    • Hypoxia     nocturnal   • Left bundle branch block    • Multiple pulmonary nodules     last assessed: 10/12/16   • Pneumonia    • Sleep apnea    • Sleep apnea, obstructive    • Smoker    • Weight loss 2019     Past Surgical History:   Procedure Laterality Date   • COLONOSCOPY     • CYSTOSCOPY     • CYSTOSCOPY  2021   • TX BLADDER INSTILLATION ANTICARCINOGENIC AGENT N/A 1/3/2020    Procedure: INSTILLATION MITOMYCIN;  Surgeon: Andrew Rangel MD;  Location: [facility];  Service: Urology   • TX CYSTO W/REMOVAL OF LESIONS SMALL N/A 1/3/2020    Procedure: TRANSURETHRAL RESECTION OF BLADDER TUMOR (TURBT);   Surgeon: Andrew Rangel MD;  Location: [facility];  Service: Urology   • TX CYSTOURETHROSCOPY WITH BIOPSY N/A 2021    Procedure: Fordyce Richards;  Surgeon: Andrew Rangel MD;  [facility];  Service: Urology   • TX TRURL ELECTROSURG [facility] PROSTATE BLEED COMPLETE N/A 2021    Procedure: TRANSURETHRAL RESECTION OF PROSTATE (TURP) BLADDER BIOPSY, FULGURATION;  Surgeon: Andrew Rangel MD;  Location: [facility];  Service: Urology     Family History   Problem Relation Age of Onset   • Diabetes Mother        Social History     Tobacco Use   Smoking Status Former   • Packs/day: 2 50   • Years: 42 00   • Pack years: 105 00   • Types: Cigarettes   • Start date:    • Quit date:    • Years since quittin 2   Smokeless Tobacco Former   Tobacco Comments    1 ppd for 37 years,  down to 5 cigs a day, is around second hand smoke         Meds/Allergies     Current Outpatient Medications:   •  acetaminophen (TYLENOL) 325 mg tablet, Take 3 tablets (975 mg total) by mouth every 8 (eight) hours as needed for mild pain or moderate pain, Disp: , Rfl: 0  •  albuterol (PROVENTIL HFA,VENTOLIN HFA) 90 mcg/act inhaler, Inhale 2 puffs every 6 (six) hours as needed for wheezing, Disp: 8 5 g, Rfl: 11  •  aspirin 81 mg chewable tablet, Chew 1 tablet (81 mg total) daily, Disp: 30 tablet, Rfl: 0  •  budesonide (Pulmicort) 0 5 mg/2 mL nebulizer solution, Take 2 mL (0 5 mg total) by nebulization 2 (two) times a day Rinse mouth after use , Disp: 120 mL, Rfl: 5  •  cyanocobalamin (VITAMIN B-12) 1000 MCG tablet, Take 1 tablet (1,000 mcg total) by mouth daily Do not start before March 1, 2023 , Disp: , Rfl: 0  •  Diclofenac Sodium (VOLTAREN) 1 %, APPLY 2 GRAMS 4 TIMES A DAY, Disp: , Rfl:   •  docusate sodium (COLACE) 100 mg capsule, Take 100 mg by mouth 2 (two) times a day, Disp: , Rfl:   •  escitalopram (LEXAPRO) 5 mg tablet, Take 1 tablet (5 mg total) by mouth daily Do not start before March 1, 2023 , Disp: , Rfl: 0  •  formoterol (PERFOROMIST) 20 MCG/2ML nebulizer solution, Take 2 mL (20 mcg total) by nebulization 2 (two) times a day, Disp: 120 mL, Rfl: 0  •  furosemide (LASIX) 20 mg tablet, Take 1 tablet (20 mg total) by mouth daily, Disp: 90 tablet, Rfl: 0  •  guaiFENesin (MUCINEX) 600 mg 12 hr tablet, Take 2 tablets (1,200 mg total) by mouth every 12 (twelve) hours, Disp: 60 tablet, Rfl: 6  •  ipratropium (ATROVENT) 0 02 % nebulizer solution, Take 2 5 mL (0 5 mg total) by nebulization 3 (three) times a day, Disp: , Rfl: 0  •  ipratropium-albuterol (DUO-NEB) 0 5-2 5 mg/3 mL nebulizer solution, Take 3 mL by nebulization 3 (three) times a day, Disp: , Rfl:   •  levalbuterol (XOPENEX) 1 25 mg/0 5 mL nebulizer solution, Take 0 5 mL (1 25 mg total) by nebulization 3 (three) times a day, Disp: , Rfl: 0  •  losartan (COZAAR) 25 mg tablet, Take 0 5 tablets (12 5 mg total) by mouth daily, Disp: 30 tablet, Rfl: 2  •  Melatonin 5 MG CAPS, Take 5 mg by mouth, Disp: , Rfl:   •  Melatonin 5 MG TABS, Take 1 tablet by mouth daily at bedtime, Disp: , Rfl:   •  predniSONE 5 mg tablet, Take 1 tablet (5 mg total) by mouth daily Do not start before March 5, 2023 , Disp: , Rfl: 0  •  rosuvastatin (CRESTOR) 10 MG tablet, Take 1 tablet (10 mg total) by mouth daily, Disp: 90 tablet, Rfl: 3  •  senna-docusate sodium (SENOKOT S) 8 6-50 mg per tablet, Take 1 tablet by mouth daily at bedtime, Disp: , Rfl: 0  •  tamsulosin (FLOMAX) 0 4 mg, Take 1 capsule (0 4 mg total) by mouth daily with dinner, Disp: , Rfl: 0  •  ergocalciferol (VITAMIN D2) 50,000 units, TAKE 1 CAPSULE (50,000 UNITS TOTAL) BY MOUTH EVERY 28 DAYS (Patient not taking: Reported on 3/30/2023), Disp: 3 capsule, Rfl: 1  •  ferrous sulfate 325 (65 Fe) mg tablet, Take 1 tablet (325 mg total) by mouth every other day Do not start before March 1, 2023  (Patient not taking: Reported on 3/30/2023), Disp: , Rfl: 0  No Known Allergies    Vitals: Blood pressure 100/58, pulse 100, temperature (!) 97 °F (36 1 °C), temperature source Tympanic, resp  rate 18, SpO2 94 %  There is no height or weight on file to calculate BMI  Oxygen Therapy  SpO2: 94 %  Oxygen Therapy: Supplemental oxygen  O2 Delivery Method: Nasal cannula  O2 Flow Rate (L/min): 3 L/min      Physical Exam  Vitals reviewed  Constitutional:       General: He is not in acute distress  Appearance: He is underweight  He is ill-appearing  He is not toxic-appearing  Interventions: Nasal cannula in place  HENT:      Head: Normocephalic  Nose: Nose normal       Mouth/Throat:      Pharynx: Oropharynx is clear  Eyes:      Conjunctiva/sclera: Conjunctivae normal    Cardiovascular:      Rate and Rhythm: Normal rate and regular rhythm  Heart sounds: Normal heart sounds  Pulmonary:      Effort: Tachypnea present  No accessory muscle usage, respiratory distress or retractions  Breath sounds: No stridor or decreased air movement  Wheezing (expiratory, scattered) present  No decreased breath sounds, rhonchi or rales  Chest:      Chest wall: No tenderness  Abdominal:      Palpations: Abdomen is soft  Musculoskeletal:         General: Normal range of motion  Cervical back: Normal range of motion  Right lower leg: Edema (1+) present  Left lower leg: Edema (1+) present  Skin:     General: Skin is warm and dry  Coloration: Skin is pale     Neurological: General: No focal deficit present  Mental Status: He is alert and oriented to person, place, and time  Psychiatric:         Mood and Affect: Mood normal          Behavior: Behavior normal          Labs: I have personally reviewed pertinent lab results  Lab Results   Component Value Date    WBC 8 04 02/28/2023    HGB 7 7 (L) 02/28/2023    HCT 23 7 (L) 02/28/2023    MCV 99 (H) 02/28/2023     02/28/2023     Lab Results   Component Value Date    GLUCOSE 90 08/17/2015    CALCIUM 8 2 (L) 02/28/2023     08/17/2015    K 3 5 02/28/2023    CO2 35 (H) 02/28/2023    CL 91 (L) 02/28/2023    BUN 18 02/28/2023    CREATININE 0 39 (L) 02/28/2023     No results found for: IGE  Lab Results   Component Value Date    ALT 29 02/16/2023    AST 35 02/16/2023    ALKPHOS 44 (L) 02/16/2023    BILITOT 0 60 08/17/2015       Imaging and other studies: I have personally reviewed pertinent reports  and I have personally reviewed pertinent films in PACS     2/23/23 CXR: New bibasilar opacity, greater on left, with loss of the diaphragm, with small effusions  Emphysema  Pulmonary function testing:  Performed 9/16/20 and personally interpreted  FEV1/FVC ratio 30%   FEV1 22% predicted  FVC 59% predicted  Significant improvement in airflow in response to bronchodilators   % predicted   % predicted  DLCO corrected for hemoglobin 45 % predicted  Very severe obstructive airflow defect  Air trapping and hyperinflation  Moderate decrease in diffusion capacity  Other Studies: I have personally reviewed pertinent reports  and I have personally reviewed pertinent films in PACS  2/21/23 Echo: LVEF 30%  Systolic function is severely reduced  Severe global hypokinesis  Diastolic function mildly abnormal, consistent with grade I abnormal relaxation

## 2023-03-30 NOTE — ASSESSMENT & PLAN NOTE
Has been trying to be compliant with his BiPAP but states that he tosses and turns so much when he sleeps, it is hard for the mask to stay on  He uses an AirCurve 10 VAuto, with an IPAP of 12 cmH2O and EPAP of 8 cmH2O  From 2/28/2023 through 3/29/2023, patient has used his BiPAP 13 out of 30 days, which is only 43% compliance  He has only used his BiPAP for greater than 4 hours 9 of those days, which is 30% compliance  His average usage on days used was 5 hours and 39 minutes  His median leak is 0  And his AHI is 0 5  Unfortunately due to cost, patient is unable to afford trilogy at this time  He also states that since he has been noncompliant with his BiPAP he now has to pay for this out-of-pocket

## 2023-03-30 NOTE — ASSESSMENT & PLAN NOTE
The patient states that he had high blood sugars while he was hospitalized  He was placed on insulin while in the hospital   This is most likely secondary to chronic prednisone use  He has a BMP that he will be completing after today's visit  I instructed him to please call his PCP tomorrow regarding results and make a follow-up appointment with her if his glucose is high  He does not currently use insulin outpatient

## 2023-03-30 NOTE — ASSESSMENT & PLAN NOTE
Patient's COPD is very severe  He has an FEV1 of 22% predicted  He is currently maintained on budesonide/formoterol twice daily and DuoNeb 3 times daily  He is also maintained on prednisone 5 mg daily  His most recent exacerbation was on 2/12 in which she was intubated for 3 days  He was sent to [facility] rehab for 2 weeks after his discharge from the hospital and has since been home for about 2 weeks and has been doing well  Patient does not wish to pursue palliative care for his very severe COPD at this time

## 2023-03-31 ENCOUNTER — TELEPHONE (OUTPATIENT)
Dept: CARDIOLOGY CLINIC | Facility: CLINIC | Age: 68
End: 2023-03-31

## 2023-03-31 DIAGNOSIS — I50.23 ACUTE ON CHRONIC SYSTOLIC (CONGESTIVE) HEART FAILURE (HCC): ICD-10-CM

## 2023-03-31 RX ORDER — FUROSEMIDE 20 MG/1
20 TABLET ORAL DAILY
Qty: 90 TABLET | Refills: 0 | Status: SHIPPED | OUTPATIENT
Start: 2023-03-31

## 2023-03-31 NOTE — TELEPHONE ENCOUNTER
Spoke with pt  His wt today is 101  6  he thinks he lost a little  Bp 107/74, hr 101 O2 sat at doctor's yesterday 97 %, Bp 100/58 hr 100   He saw pulmonary yesterday and states breathing is unchanged  His feet have a little edema still  He does elevate them, but is having a hard time getting compression socks on  They ordered some kind of tube stocking that you can cut to size  It was suggested by pulmonologist     Denies any other symptoms of CHF  Eating low salt diet and following fluid restriction  Was taking Lasix 20 mg bid for 3 days  Regular dose is 20 mg qd  Should he go back to qd lasix?       Please advise

## 2023-03-31 NOTE — TELEPHONE ENCOUNTER
Let's have him continue on the 20 mg once daily for now  He can take a PRN dose of 20 for any rapid weight gain or signs of worsening fluid retention       Thanks   Joshua Ayala

## 2023-03-31 NOTE — TELEPHONE ENCOUNTER
DELIA Hayden RN Rose,     Can you please check in on Elease Maribelgllissette  Had him go up on his Lasix for a few days  Feet were edematous and red at our visit this week  Need to see if I should keep him on the BID or go back to daily  Can let him know labs are stable   Thanks

## 2023-04-13 PROBLEM — J96.12 CHRONIC RESPIRATORY FAILURE WITH HYPOXIA AND HYPERCAPNIA (HCC): Status: ACTIVE | Noted: 2020-11-19

## 2023-04-28 ENCOUNTER — TELEPHONE (OUTPATIENT)
Dept: PULMONOLOGY | Facility: CLINIC | Age: 68
End: 2023-04-28

## 2023-04-28 ENCOUNTER — OFFICE VISIT (OUTPATIENT)
Dept: INTERNAL MEDICINE CLINIC | Facility: CLINIC | Age: 68
End: 2023-04-28

## 2023-04-28 VITALS
DIASTOLIC BLOOD PRESSURE: 60 MMHG | RESPIRATION RATE: 20 BRPM | HEART RATE: 101 BPM | OXYGEN SATURATION: 92 % | SYSTOLIC BLOOD PRESSURE: 100 MMHG

## 2023-04-28 DIAGNOSIS — J44.1 COPD EXACERBATION (HCC): Primary | ICD-10-CM

## 2023-04-28 DIAGNOSIS — I50.22 CHRONIC HFREF (HEART FAILURE WITH REDUCED EJECTION FRACTION) (HCC): ICD-10-CM

## 2023-04-28 DIAGNOSIS — J96.12 CHRONIC RESPIRATORY FAILURE WITH HYPOXIA AND HYPERCAPNIA (HCC): ICD-10-CM

## 2023-04-28 DIAGNOSIS — J96.11 CHRONIC RESPIRATORY FAILURE WITH HYPOXIA AND HYPERCAPNIA (HCC): ICD-10-CM

## 2023-04-28 PROBLEM — U07.1 COVID-19 VIRUS INFECTION: Status: RESOLVED | Noted: 2023-02-23 | Resolved: 2023-04-28

## 2023-04-28 RX ORDER — AZITHROMYCIN 250 MG/1
TABLET, FILM COATED ORAL
Qty: 6 TABLET | Refills: 0 | Status: ON HOLD | OUTPATIENT
Start: 2023-04-28 | End: 2023-05-02

## 2023-04-28 NOTE — TELEPHONE ENCOUNTER
Patients wife called, asking to get Jenni Huber in for sooner follow up appointment per his PCP  He had an appointment today with his PCP and was having a COPD exacerbation  He denied going to the ER and was prescribed an antibiotic and increased prednisone  I scheduled him in at the [facility] on Friday May 5th with Evelia Walters for the time being  She wanted to see if this was all ok by Dr Luque and if there was any advice on changing this regimen at this time   Please advise

## 2023-04-28 NOTE — PROGRESS NOTES
"Assessment/Plan:    Problem List Items Addressed This Visit        Respiratory    Chronic respiratory failure with hypoxia and hypercapnia (HCC)    Relevant Medications    azithromycin (ZITHROMAX) 250 mg tablet    COPD exacerbation ([facility] ) - Primary     -patient presents with increased cough with sputum production and increased oxygen need  -he is refusing to go to ED  -start zpak, mucinex and increase prednisone to 20mg daily x5 then 15mg x 5d then 10mg x5d and return to base of 5mg daily  -continue duonebx QID and inh  -advised to follow up with Pulmonary sooner         Relevant Medications    azithromycin (ZITHROMAX) 250 mg tablet       Cardiovascular and Mediastinum    Chronic HFrEF (heart failure with reduced ejection fraction) ([facility] )     -patient recently switched to torsemide 20mg daily with persistent BLE edema  -advised ED eval as COPD exac may be due to HF as well  PT refuses  Subjective:      Patient ID: Bala Kebede  is a 74 y o  male  HPI  72yo male presents with his wife  He feels SOB, has a lot more cough and has been taking robitussin  Began several days ago  He increased oxygen use from 3L to 4L to sp02 at 89-93%  He has increased duonebs to QID  He remain son prednisone 5mg daily  Home BP have been \"low\"  Denies sick contacts      The following portions of the patient's history were reviewed and updated as appropriate: allergies, current medications, past family history, past medical history, past social history, past surgical history and problem list       Current Outpatient Medications:   •  azithromycin (ZITHROMAX) 250 mg tablet, Take 2 tablets today then 1 tablet daily x 4 days, Disp: 6 tablet, Rfl: 0  •  acetaminophen (TYLENOL) 325 mg tablet, Take 3 tablets (975 mg total) by mouth every 8 (eight) hours as needed for mild pain or moderate pain, Disp: , Rfl: 0  •  albuterol (PROVENTIL HFA,VENTOLIN HFA) 90 mcg/act inhaler, Inhale 2 puffs every 6 (six) hours as needed for " wheezing, Disp: 8 5 g, Rfl: 11  •  aspirin 81 mg chewable tablet, Chew 1 tablet (81 mg total) daily, Disp: 30 tablet, Rfl: 0  •  budesonide (Pulmicort) 0 5 mg/2 mL nebulizer solution, Take 2 mL (0 5 mg total) by nebulization 2 (two) times a day Rinse mouth after use , Disp: 120 mL, Rfl: 5  •  cyanocobalamin (VITAMIN B-12) 1000 MCG tablet, Take 1 tablet (1,000 mcg total) by mouth daily Do not start before March 1, 2023 , Disp: , Rfl: 0  •  Diclofenac Sodium (VOLTAREN) 1 %, APPLY 2 GRAMS 4 TIMES A DAY, Disp: , Rfl:   •  docusate sodium (COLACE) 100 mg capsule, Take 100 mg by mouth 2 (two) times a day, Disp: , Rfl:   •  ergocalciferol (VITAMIN D2) 50,000 units, TAKE 1 CAPSULE (50,000 UNITS TOTAL) BY MOUTH EVERY 28 DAYS (Patient not taking: Reported on 3/30/2023), Disp: 3 capsule, Rfl: 1  •  escitalopram (LEXAPRO) 5 mg tablet, Take 1 tablet (5 mg total) by mouth daily Do not start before March 1, 2023 , Disp: , Rfl: 0  •  ferrous sulfate 325 (65 Fe) mg tablet, Take 1 tablet (325 mg total) by mouth every other day Do not start before March 1, 2023   (Patient not taking: Reported on 3/30/2023), Disp: , Rfl: 0  •  guaiFENesin (MUCINEX) 600 mg 12 hr tablet, Take 2 tablets (1,200 mg total) by mouth every 12 (twelve) hours, Disp: 60 tablet, Rfl: 6  •  ipratropium (ATROVENT) 0 02 % nebulizer solution, Take 2 5 mL (0 5 mg total) by nebulization 3 (three) times a day, Disp: 225 mL, Rfl: 5  •  ipratropium-albuterol (DUO-NEB) 0 5-2 5 mg/3 mL nebulizer solution, Take 3 mL by nebulization 3 (three) times a day, Disp: , Rfl:   •  levalbuterol (XOPENEX) 1 25 mg/0 5 mL nebulizer solution, Take 0 5 mL (1 25 mg total) by nebulization 3 (three) times a day, Disp: , Rfl: 0  •  losartan (COZAAR) 25 mg tablet, Take 0 5 tablets (12 5 mg total) by mouth daily, Disp: 30 tablet, Rfl: 2  •  Melatonin 5 MG CAPS, Take 5 mg by mouth, Disp: , Rfl:   •  Melatonin 5 MG TABS, Take 1 tablet by mouth daily at bedtime (Patient not taking: Reported on 4/13/2023), Disp: , Rfl:   •  predniSONE 5 mg tablet, Take 1 tablet (5 mg total) by mouth daily Do not start before March 5, 2023 , Disp: , Rfl: 0  •  rosuvastatin (CRESTOR) 10 MG tablet, Take 1 tablet (10 mg total) by mouth daily, Disp: 90 tablet, Rfl: 3  •  senna-docusate sodium (SENOKOT S) 8 6-50 mg per tablet, Take 1 tablet by mouth daily at bedtime, Disp: , Rfl: 0  •  tamsulosin (FLOMAX) 0 4 mg, Take 1 capsule (0 4 mg total) by mouth daily with dinner, Disp: , Rfl: 0  •  torsemide (DEMADEX) 20 mg tablet, Take 1 tablet (20 mg total) by mouth daily, Disp: 90 tablet, Rfl: 3    Review of Systems   Constitutional: Positive for activity change  Negative for fever  Respiratory: Positive for cough, shortness of breath and wheezing  Cardiovascular: Positive for leg swelling  Negative for chest pain and palpitations  Neurological: Positive for headaches  Negative for dizziness  Objective:    /60   Pulse 101   Resp 20   SpO2 92% Comment: 3L continuous     Physical Exam  Vitals reviewed  Constitutional:       General: He is not in acute distress  HENT:      Head: Normocephalic  Nose:      Comments: Wears nasal cannula  Cardiovascular:      Rate and Rhythm: Regular rhythm  Tachycardia present  Pulses: Normal pulses  Heart sounds: Normal heart sounds  Pulmonary:      Breath sounds: Wheezing (faint), rhonchi and rales present  Comments: tachypneic  Decreased BS  Chest:      Chest wall: No tenderness  Musculoskeletal:      Cervical back: Neck supple  Right lower leg: Edema present  Left lower leg: Edema present  Lymphadenopathy:      Cervical: No cervical adenopathy  Neurological:      Mental Status: He is alert and oriented to person, place, and time     Psychiatric:         Behavior: Behavior normal

## 2023-04-28 NOTE — ASSESSMENT & PLAN NOTE
-patient presents with increased cough with sputum production and increased oxygen need  -he is refusing to go to ED  -start zpak, mucinex and increase prednisone to 20mg daily x5 then 15mg x 5d then 10mg x5d and return to base of 5mg daily  -continue duonebx QID and inh  -advised to follow up with Pulmonary sooner

## 2023-04-28 NOTE — ASSESSMENT & PLAN NOTE
-patient recently switched to torsemide 20mg daily with persistent BLE edema  -advised ED eval as COPD exac may be due to HF as well  PT refuses

## 2023-04-28 NOTE — PROGRESS NOTES
Assessment and Plan:     Problem List Items Addressed This Visit    None       Preventive health issues were discussed with patient, and age appropriate screening tests were ordered as noted in patient's After Visit Summary  Personalized health advice and appropriate referrals for health education or preventive services given if needed, as noted in patient's After Visit Summary       History of Present Illness:     Patient presents for a Medicare Wellness Visit    HPI   Patient Care Team:  Cristóbal Castro DO as PCP - General  Cristóbal Castro DO as PCP - PCP-[facility]  DO Jose Luis Stinson MD Maretta Goring, MD (Urology)  [facility] (Optometry)  Wilder Lerner MD (Pulmonary Disease)     Review of Systems:     Review of Systems     Problem List:     Patient Active Problem List   Diagnosis   • Nonobstructive atherosclerosis of coronary artery   • Nonischemic cardiomyopathy [facility])   • Chronic obstructive pulmonary disease (HCC)   • Left bundle-branch block   • KEVIN and COPD overlap syndrome (HCC)   • Gastroesophageal reflux disease   • Impaired fasting glucose   • Osteoarthritis   • Osteoporosis   • Vitamin D deficiency   • Mood disorder (HCC)   • Other insomnia   • Macrocytosis without anemia   • Chronic respiratory failure with hypoxia and hypercapnia ([facility])   • BPH with obstruction/lower urinary tract symptoms   • Prostate cancer ([facility] )   • Pulmonary nodules/lesions, multiple   • Protein-calorie malnutrition ([facility] )   • Chronic HFrEF (heart failure with reduced ejection fraction) ([facility] )   • Anemia   • Physical deconditioning   • COVID-19 virus infection   • Hyperglycemia      Past Medical and Surgical History:     Past Medical History:   Diagnosis Date   • Acute metabolic encephalopathy 5/59/8192   • Arthritis    • Bladder mass    • Cardiomyopathy [facility])    • Chest pain    • COPD (chronic obstructive pulmonary disease) ([facility] )    • CPAP (continuous positive airway pressure) dependence    • Emphysema of lung (HCC)    • Hypoxia     nocturnal   • Left bundle branch block    • Multiple pulmonary nodules     last assessed: 10/12/16   • Pneumonia    • Sleep apnea    • Sleep apnea, obstructive    • Smoker    • Weight loss 2019     Past Surgical History:   Procedure Laterality Date   • COLONOSCOPY     • CYSTOSCOPY     • CYSTOSCOPY  2021   • SC BLADDER INSTILLATION ANTICARCINOGENIC AGENT N/A 1/3/2020    Procedure: INSTILLATION MITOMYCIN;  Surgeon: Kevon Michelle MD;  [facility];  Service: Urology   • SC CYSTO W/REMOVAL OF LESIONS SMALL N/A 1/3/2020    Procedure: TRANSURETHRAL RESECTION OF BLADDER TUMOR (TURBT);   Surgeon: eKvon Michelle MD;  [facility];  Service: Urology   • SC CYSTOURETHROSCOPY WITH BIOPSY N/A 2021    Procedure: Corby Barone;  Surgeon: Kevon Michelle MD;  [facility];  Service: Urology   • [place] [id number] COMPLETE N/A 2021    Procedure: TRANSURETHRAL RESECTION OF PROSTATE (TURP) BLADDER BIOPSY, FULGURATION;  Surgeon: Kevon Michelle MD;  [facility];  Service: Urology      Family History:     Family History   Problem Relation Age of Onset   • Diabetes Mother       Social History:     Social History     Socioeconomic History   • Marital status: /Civil Union     Spouse name: Not on file   • Number of children: Not on file   • Years of education: Not on file   • Highest education level: Not on file   Occupational History   • Not on file   Tobacco Use   • Smoking status: Former     Packs/day: 2 50     Years: 42 00     Pack years: 105 00     Types: Cigarettes     Start date: 5     Quit date:      Years since quittin 3   • Smokeless tobacco: Former   • Tobacco comments:     1 ppd for 37 years, 2010 down to 5 cigs a day, is around second hand smoke   Vaping Use   • Vaping Use: Never used   Substance and Sexual Activity   • Alcohol use: Not Currently     Comment: rarely   • Drug use: No   • Sexual activity: Not Currently     Partners: Female   Other Topics Concern   • Not on file   Social History Narrative    Daily coffee consumption: 8 or more cups a day        Used to work in [employer]  On disability  Social Determinants of Health     Financial Resource Strain: Not on file   Food Insecurity: No Food Insecurity   • Worried About [address] in the Last Year: Never true   • Ran Out of Food in the Last Year: Never true   Transportation Needs: No Transportation Needs   • Lack of Transportation (Medical): No   • Lack of Transportation (Non-Medical): No   Physical Activity: Not on file   Stress: Not on file   Social Connections: Not on file   Intimate Partner Violence: Not on file   Housing Stability: Unknown   • Unable to Pay for Housing in the Last Year: Not on file   • Number of Places Lived in the Last Year: Not on file   • Unstable Housing in the Last Year: No      Medications and Allergies:     Current Outpatient Medications   Medication Sig Dispense Refill   • acetaminophen (TYLENOL) 325 mg tablet Take 3 tablets (975 mg total) by mouth every 8 (eight) hours as needed for mild pain or moderate pain  0   • albuterol (PROVENTIL HFA,VENTOLIN HFA) 90 mcg/act inhaler Inhale 2 puffs every 6 (six) hours as needed for wheezing 8 5 g 11   • aspirin 81 mg chewable tablet Chew 1 tablet (81 mg total) daily 30 tablet 0   • budesonide (Pulmicort) 0 5 mg/2 mL nebulizer solution Take 2 mL (0 5 mg total) by nebulization 2 (two) times a day Rinse mouth after use   120 mL 5   • cyanocobalamin (VITAMIN B-12) 1000 MCG tablet Take 1 tablet (1,000 mcg total) by mouth daily Do not start before March 1, 2023   0   • Diclofenac Sodium (VOLTAREN) 1 % APPLY 2 GRAMS 4 TIMES A DAY     • docusate sodium (COLACE) 100 mg capsule Take 100 mg by mouth 2 (two) times a day     • ergocalciferol (VITAMIN D2) 50,000 units TAKE 1 CAPSULE (50,000 UNITS TOTAL) BY MOUTH EVERY 28 DAYS (Patient not taking: Reported on 3/30/2023) 3 capsule 1   • escitalopram (LEXAPRO) 5 mg tablet Take 1 tablet (5 mg total) by mouth daily Do not start before March 1, 2023   0   • ferrous sulfate 325 (65 Fe) mg tablet Take 1 tablet (325 mg total) by mouth every other day Do not start before March 1, 2023  (Patient not taking: Reported on 3/30/2023)  0   • guaiFENesin (MUCINEX) 600 mg 12 hr tablet Take 2 tablets (1,200 mg total) by mouth every 12 (twelve) hours 60 tablet 6   • ipratropium (ATROVENT) 0 02 % nebulizer solution Take 2 5 mL (0 5 mg total) by nebulization 3 (three) times a day 225 mL 5   • ipratropium-albuterol (DUO-NEB) 0 5-2 5 mg/3 mL nebulizer solution Take 3 mL by nebulization 3 (three) times a day     • levalbuterol (XOPENEX) 1 25 mg/0 5 mL nebulizer solution Take 0 5 mL (1 25 mg total) by nebulization 3 (three) times a day  0   • losartan (COZAAR) 25 mg tablet Take 0 5 tablets (12 5 mg total) by mouth daily 30 tablet 2   • Melatonin 5 MG CAPS Take 5 mg by mouth     • Melatonin 5 MG TABS Take 1 tablet by mouth daily at bedtime (Patient not taking: Reported on 4/13/2023)     • predniSONE 5 mg tablet Take 1 tablet (5 mg total) by mouth daily Do not start before March 5, 2023   0   • rosuvastatin (CRESTOR) 10 MG tablet Take 1 tablet (10 mg total) by mouth daily 90 tablet 3   • senna-docusate sodium (SENOKOT S) 8 6-50 mg per tablet Take 1 tablet by mouth daily at bedtime  0   • tamsulosin (FLOMAX) 0 4 mg Take 1 capsule (0 4 mg total) by mouth daily with dinner  0   • torsemide (DEMADEX) 20 mg tablet Take 1 tablet (20 mg total) by mouth daily 90 tablet 3     No current facility-administered medications for this visit       No Known Allergies   Immunizations:     Immunization History   Administered Date(s) Administered   • COVID-19 PFIZER VACCINE 0 3 ML IM 05/25/2021, 06/15/2021, 01/15/2022   • INFLUENZA 09/27/2022   • Influenza Quadrivalent Preservative Free 3 years and older IM 10/14/2014   • Influenza Quadrivalent Preservative Free ID 12/18/2015, 10/12/2016   • Influenza Recombinant Preservative Free Im 02/01/2022   • Influenza Split High Dose Preservative Free IM 09/11/2017   • Influenza, high dose seasonal 0 7 mL 08/31/2020, 09/27/2022   • Influenza, recombinant, quadrivalent,injectable, preservative free 11/21/2019   • Influenza, seasonal, injectable 04/30/1954   • Pneumococcal Conjugate 13-Valent 02/25/2021   • Pneumococcal Conjugate Vaccine 20-valent (Pcv20), Polysace 09/02/2022   • Pneumococcal Polysaccharide PPV23 12/01/2013   • Td (adult), adsorbed 01/01/2012   • Tuberculin Skin Test-PPD Intradermal 01/11/2016, 01/17/2017      Health Maintenance:         Topic Date Due   • Lung Cancer Screening  12/21/2023   • Colorectal Cancer Screening  11/10/2024   • HIV Screening  Completed   • Hepatitis C Screening  Completed         Topic Date Due   • DTaP,Tdap,and Td Vaccines (1 - Tdap) 01/02/2012   • COVID-19 Vaccine (4 - Booster for Pfizer series) 03/12/2022      Medicare Screening Tests and Risk Assessments:         PREVENTIVE SCREENINGS      Cardiovascular Screening:    General: Screening Not Indicated and History Lipid Disorder      Diabetes Screening:     General: Screening Current      Colorectal Cancer Screening:     General: Screening Current      Prostate Cancer Screening:    General: History Prostate Cancer      Osteoporosis Screening:    General: Screening Not Indicated and History Osteoporosis      Abdominal Aortic Aneurysm (AAA) Screening:    Risk factors include: age between 73-69 yo and tobacco use        Lung Cancer Screening:     General: Screening Current      Hepatitis C Screening:    General: Screening Current    No results found  Physical Exam:     There were no vitals taken for this visit      Physical Exam     Viv Arcos DO

## 2023-04-28 NOTE — TELEPHONE ENCOUNTER
I called and spoke with the pt's wife  They were requesting a sooner appt   Pt is scheduled for Monday 5/1 with Dr Sosa Pleasant

## 2023-04-30 ENCOUNTER — APPOINTMENT (EMERGENCY)
Dept: RADIOLOGY | Facility: HOSPITAL | Age: 68
End: 2023-04-30

## 2023-04-30 ENCOUNTER — HOSPITAL ENCOUNTER (INPATIENT)
Facility: HOSPITAL | Age: 68
LOS: 10 days | Discharge: HOME HEALTH | End: 2023-05-10
Attending: EMERGENCY MEDICINE | Admitting: EMERGENCY MEDICINE

## 2023-04-30 DIAGNOSIS — D64.9 ANEMIA: ICD-10-CM

## 2023-04-30 DIAGNOSIS — J43.9 PULMONARY EMPHYSEMA, UNSPECIFIED EMPHYSEMA TYPE (HCC): Chronic | ICD-10-CM

## 2023-04-30 DIAGNOSIS — J96.22 ACUTE ON CHRONIC RESPIRATORY FAILURE WITH HYPOXIA AND HYPERCAPNIA (HCC): Primary | ICD-10-CM

## 2023-04-30 DIAGNOSIS — J96.11 CHRONIC RESPIRATORY FAILURE WITH HYPOXIA AND HYPERCAPNIA (HCC): ICD-10-CM

## 2023-04-30 DIAGNOSIS — J96.12 CHRONIC RESPIRATORY FAILURE WITH HYPOXIA AND HYPERCAPNIA (HCC): ICD-10-CM

## 2023-04-30 DIAGNOSIS — J44.1 COPD EXACERBATION (HCC): ICD-10-CM

## 2023-04-30 DIAGNOSIS — J96.21 ACUTE ON CHRONIC RESPIRATORY FAILURE WITH HYPOXIA AND HYPERCAPNIA (HCC): Primary | ICD-10-CM

## 2023-04-30 DIAGNOSIS — I50.23 ACUTE ON CHRONIC SYSTOLIC CHF (CONGESTIVE HEART FAILURE) (HCC): ICD-10-CM

## 2023-04-30 LAB
2HR DELTA HS TROPONIN: -3 NG/L
ALBUMIN SERPL BCP-MCNC: 3.3 G/DL (ref 3.5–5)
ALP SERPL-CCNC: 68 U/L (ref 46–116)
ALT SERPL W P-5'-P-CCNC: 23 U/L (ref 12–78)
ANION GAP SERPL CALCULATED.3IONS-SCNC: 1 MMOL/L (ref 4–13)
AST SERPL W P-5'-P-CCNC: 35 U/L (ref 5–45)
BACTERIA UR QL AUTO: ABNORMAL /HPF
BASE EX.OXY STD BLDV CALC-SCNC: 75 % (ref 60–80)
BASE EX.OXY STD BLDV CALC-SCNC: 86.4 % (ref 60–80)
BASE EXCESS BLDV CALC-SCNC: 13.4 MMOL/L
BASE EXCESS BLDV CALC-SCNC: 15.8 MMOL/L
BASOPHILS # BLD AUTO: 0.02 THOUSANDS/ÂΜL (ref 0–0.1)
BASOPHILS NFR BLD AUTO: 0 % (ref 0–1)
BILIRUB SERPL-MCNC: 0.44 MG/DL (ref 0.2–1)
BILIRUB UR QL STRIP: NEGATIVE
BUN SERPL-MCNC: 37 MG/DL (ref 5–25)
BUN SERPL-MCNC: 37 MG/DL (ref 5–25)
CALCIUM ALBUM COR SERPL-MCNC: 10.3 MG/DL (ref 8.3–10.1)
CALCIUM SERPL-MCNC: 9.5 MG/DL (ref 8.3–10.1)
CALCIUM SERPL-MCNC: 9.7 MG/DL (ref 8.3–10.1)
CARDIAC TROPONIN I PNL SERPL HS: 31 NG/L
CARDIAC TROPONIN I PNL SERPL HS: 34 NG/L
CHLORIDE SERPL-SCNC: 86 MMOL/L (ref 96–108)
CHLORIDE SERPL-SCNC: 86 MMOL/L (ref 96–108)
CLARITY UR: ABNORMAL
CO2 SERPL-SCNC: 42 MMOL/L (ref 21–32)
CO2 SERPL-SCNC: >45 MMOL/L (ref 21–32)
COLOR UR: YELLOW
CREAT SERPL-MCNC: 0.72 MG/DL (ref 0.6–1.3)
CREAT SERPL-MCNC: 0.91 MG/DL (ref 0.6–1.3)
EOSINOPHIL # BLD AUTO: 0.01 THOUSAND/ÂΜL (ref 0–0.61)
EOSINOPHIL NFR BLD AUTO: 0 % (ref 0–6)
ERYTHROCYTE [DISTWIDTH] IN BLOOD BY AUTOMATED COUNT: 12 % (ref 11.6–15.1)
GFR SERPL CREATININE-BSD FRML MDRD: 87 ML/MIN/1.73SQ M
GFR SERPL CREATININE-BSD FRML MDRD: 97 ML/MIN/1.73SQ M
GLUCOSE SERPL-MCNC: 122 MG/DL (ref 65–140)
GLUCOSE SERPL-MCNC: 152 MG/DL (ref 65–140)
GLUCOSE UR STRIP-MCNC: NEGATIVE MG/DL
HCO3 BLDV-SCNC: 41 MMOL/L (ref 24–30)
HCO3 BLDV-SCNC: 45.8 MMOL/L (ref 24–30)
HCT VFR BLD AUTO: 39.5 % (ref 36.5–49.3)
HGB BLD-MCNC: 12.1 G/DL (ref 12–17)
HGB UR QL STRIP.AUTO: ABNORMAL
IMM GRANULOCYTES # BLD AUTO: 0.05 THOUSAND/UL (ref 0–0.2)
IMM GRANULOCYTES NFR BLD AUTO: 1 % (ref 0–2)
KETONES UR STRIP-MCNC: NEGATIVE MG/DL
LEUKOCYTE ESTERASE UR QL STRIP: ABNORMAL
LYMPHOCYTES # BLD AUTO: 1.15 THOUSANDS/ÂΜL (ref 0.6–4.47)
LYMPHOCYTES NFR BLD AUTO: 16 % (ref 14–44)
MCH RBC QN AUTO: 31.7 PG (ref 26.8–34.3)
MCHC RBC AUTO-ENTMCNC: 30.6 G/DL (ref 31.4–37.4)
MCV RBC AUTO: 103 FL (ref 82–98)
MONOCYTES # BLD AUTO: 0.97 THOUSAND/ÂΜL (ref 0.17–1.22)
MONOCYTES NFR BLD AUTO: 14 % (ref 4–12)
NEUTROPHILS # BLD AUTO: 4.96 THOUSANDS/ÂΜL (ref 1.85–7.62)
NEUTS SEG NFR BLD AUTO: 69 % (ref 43–75)
NITRITE UR QL STRIP: NEGATIVE
NON-SQ EPI CELLS URNS QL MICRO: ABNORMAL /HPF
NRBC BLD AUTO-RTO: 0 /100 WBCS
NT-PROBNP SERPL-MCNC: ABNORMAL PG/ML
O2 CT BLDV-SCNC: 12.6 ML/DL
O2 CT BLDV-SCNC: 15.4 ML/DL
OSMOLALITY UR/SERPL-RTO: 292 MMOL/KG (ref 282–298)
OSMOLALITY UR: 577 MMOL/KG
PCO2 BLDV: 67.1 MM HG (ref 42–50)
PCO2 BLDV: 90.9 MM HG (ref 42–50)
PH BLDV: 7.32 [PH] (ref 7.3–7.4)
PH BLDV: 7.4 [PH] (ref 7.3–7.4)
PH UR STRIP.AUTO: 6.5 [PH]
PLATELET # BLD AUTO: 269 THOUSANDS/UL (ref 149–390)
PMV BLD AUTO: 9.5 FL (ref 8.9–12.7)
PO2 BLDV: 47.4 MM HG (ref 35–45)
PO2 BLDV: 72.3 MM HG (ref 35–45)
POTASSIUM SERPL-SCNC: 4.3 MMOL/L (ref 3.5–5.3)
POTASSIUM SERPL-SCNC: 4.7 MMOL/L (ref 3.5–5.3)
PROCALCITONIN SERPL-MCNC: 0.1 NG/ML
PROT SERPL-MCNC: 6.6 G/DL (ref 6.4–8.4)
PROT UR STRIP-MCNC: ABNORMAL MG/DL
RBC # BLD AUTO: 3.82 MILLION/UL (ref 3.88–5.62)
RBC #/AREA URNS AUTO: ABNORMAL /HPF
SODIUM SERPL-SCNC: 129 MMOL/L (ref 135–147)
SODIUM SERPL-SCNC: 132 MMOL/L (ref 135–147)
SP GR UR STRIP.AUTO: 1.02 (ref 1–1.03)
UROBILINOGEN UR STRIP-ACNC: 2 MG/DL
WBC # BLD AUTO: 7.16 THOUSAND/UL (ref 4.31–10.16)
WBC #/AREA URNS AUTO: ABNORMAL /HPF

## 2023-04-30 RX ORDER — ALBUTEROL SULFATE 2.5 MG/3ML
SOLUTION RESPIRATORY (INHALATION)
Status: COMPLETED
Start: 2023-04-30 | End: 2023-04-30

## 2023-04-30 RX ORDER — LEVALBUTEROL 1.25 MG/.5ML
1.25 SOLUTION, CONCENTRATE RESPIRATORY (INHALATION)
Status: DISCONTINUED | OUTPATIENT
Start: 2023-04-30 | End: 2023-05-02 | Stop reason: SDUPTHER

## 2023-04-30 RX ORDER — SODIUM CHLORIDE FOR INHALATION 0.9 %
3 VIAL, NEBULIZER (ML) INHALATION ONCE
Status: COMPLETED | OUTPATIENT
Start: 2023-04-30 | End: 2023-04-30

## 2023-04-30 RX ORDER — FORMOTEROL FUMARATE 20 UG/2ML
20 SOLUTION RESPIRATORY (INHALATION)
Status: DISCONTINUED | OUTPATIENT
Start: 2023-04-30 | End: 2023-05-10 | Stop reason: HOSPADM

## 2023-04-30 RX ORDER — ENOXAPARIN SODIUM 100 MG/ML
40 INJECTION SUBCUTANEOUS DAILY
Status: DISCONTINUED | OUTPATIENT
Start: 2023-05-01 | End: 2023-05-10 | Stop reason: HOSPADM

## 2023-04-30 RX ORDER — METHYLPREDNISOLONE SODIUM SUCCINATE 125 MG/2ML
125 INJECTION, POWDER, LYOPHILIZED, FOR SOLUTION INTRAMUSCULAR; INTRAVENOUS ONCE
Status: COMPLETED | OUTPATIENT
Start: 2023-04-30 | End: 2023-04-30

## 2023-04-30 RX ORDER — BUDESONIDE 0.5 MG/2ML
0.5 INHALANT ORAL
Status: DISCONTINUED | OUTPATIENT
Start: 2023-04-30 | End: 2023-05-10 | Stop reason: HOSPADM

## 2023-04-30 RX ORDER — ALBUTEROL SULFATE 2.5 MG/3ML
2.5 SOLUTION RESPIRATORY (INHALATION) ONCE
Status: COMPLETED | OUTPATIENT
Start: 2023-04-30 | End: 2023-04-30

## 2023-04-30 RX ORDER — METHYLPREDNISOLONE SODIUM SUCCINATE 40 MG/ML
40 INJECTION, POWDER, LYOPHILIZED, FOR SOLUTION INTRAMUSCULAR; INTRAVENOUS EVERY 8 HOURS SCHEDULED
Status: DISCONTINUED | OUTPATIENT
Start: 2023-05-01 | End: 2023-05-02

## 2023-04-30 RX ORDER — CHLORHEXIDINE GLUCONATE 0.12 MG/ML
15 RINSE ORAL EVERY 12 HOURS SCHEDULED
Status: DISCONTINUED | OUTPATIENT
Start: 2023-04-30 | End: 2023-05-02

## 2023-04-30 RX ORDER — NITROGLYCERIN 20 MG/100ML
5-200 INJECTION INTRAVENOUS
Status: DISCONTINUED | OUTPATIENT
Start: 2023-04-30 | End: 2023-04-30

## 2023-04-30 RX ADMIN — SODIUM CHLORIDE 500 ML: 0.9 INJECTION, SOLUTION INTRAVENOUS at 22:22

## 2023-04-30 RX ADMIN — ALBUTEROL SULFATE 2.5 MG: 2.5 SOLUTION RESPIRATORY (INHALATION) at 17:53

## 2023-04-30 RX ADMIN — AZITHROMYCIN MONOHYDRATE 500 MG: 500 INJECTION, POWDER, LYOPHILIZED, FOR SOLUTION INTRAVENOUS at 21:51

## 2023-04-30 RX ADMIN — IPRATROPIUM BROMIDE 1 MG: 0.5 SOLUTION RESPIRATORY (INHALATION) at 18:27

## 2023-04-30 RX ADMIN — LEVALBUTEROL HYDROCHLORIDE 1.25 MG: 1.25 SOLUTION, CONCENTRATE RESPIRATORY (INHALATION) at 19:14

## 2023-04-30 RX ADMIN — IPRATROPIUM BROMIDE 0.5 MG: 0.5 SOLUTION RESPIRATORY (INHALATION) at 17:53

## 2023-04-30 RX ADMIN — ALBUTEROL SULFATE 2.5 MG: 2.5 SOLUTION RESPIRATORY (INHALATION) at 17:59

## 2023-04-30 RX ADMIN — NITROGLYCERIN 20 MCG/MIN: 20 INJECTION INTRAVENOUS at 18:44

## 2023-04-30 RX ADMIN — ALBUTEROL SULFATE 10 MG: 0.83 SOLUTION RESPIRATORY (INHALATION) at 18:26

## 2023-04-30 RX ADMIN — METHYLPREDNISOLONE SODIUM SUCCINATE 125 MG: 125 INJECTION, POWDER, FOR SOLUTION INTRAMUSCULAR; INTRAVENOUS at 18:36

## 2023-04-30 RX ADMIN — IPRATROPIUM BROMIDE 0.5 MG: 0.5 SOLUTION RESPIRATORY (INHALATION) at 19:14

## 2023-04-30 NOTE — RESPIRATORY THERAPY NOTE
RT Protocol Note  Osbaldo Liu  79 y o  male MRN: [id number]  Unit/Bed#: ED 14 Encounter: [id number]    Assessment    Active Problems:    * No active hospital problems  *      Home Pulmonary Medications:  duoneb       Past Medical History:   Diagnosis Date    Acute metabolic encephalopathy     Arthritis     Bladder mass     Cardiomyopathy (HCC)     Chest pain     COPD (chronic obstructive pulmonary disease) (HCC)     COVID-19 virus infection 2023    CPAP (continuous positive airway pressure) dependence     Emphysema of lung (HCC)     Hypoxia     nocturnal    Left bundle branch block     Multiple pulmonary nodules     last assessed: 10/12/16    Pneumonia     Sleep apnea     Sleep apnea, obstructive     Smoker     Weight loss 2019     Social History     Socioeconomic History    Marital status: /Civil Union     Spouse name: None    Number of children: None    Years of education: None    Highest education level: None   Occupational History    None   Tobacco Use    Smoking status: Former     Packs/day: 2 50     Years: 42 00     Pack years: 105 00     Types: Cigarettes     Start date:      Quit date:      Years since quittin 3    Smokeless tobacco: Former    Tobacco comments:     1 ppd for 37 years, 2010 down to 5 cigs a day, is around second hand smoke   Vaping Use    Vaping Use: Never used   Substance and Sexual Activity    Alcohol use: Not Currently     Comment: rarely    Drug use: No    Sexual activity: Not Currently     Partners: Female   Other Topics Concern    None   Social History Narrative    Daily coffee consumption: 8 or more cups a day        Used to work in [employer]  On disability       Social Determinants of Health     Financial Resource Strain: Not on file   Food Insecurity: No Food Insecurity    Worried About [address] in the Last Year: Never true    Ran Out of Food in the Last Year: Never true   Transportation Needs: No Transportation Needs Lack of Transportation (Medical): No    Lack of Transportation (Non-Medical): No   Physical Activity: Not on file   Stress: Not on file   Social Connections: Not on file   Intimate Partner Violence: Not on file   Housing Stability: Unknown    Unable to Pay for Housing in the Last Year: Not on file    Number of Places Lived in the Last Year: Not on file    Unstable Housing in the Last Year: No       Subjective         Objective    Physical Exam:   Assessment Type: Assess only  General Appearance: Lethargic  Respiratory Pattern: Labored, Dyspnea at rest  Chest Assessment: Chest expansion symmetrical  Bilateral Breath Sounds: Diminished, Expiratory wheezes  O2 Device: bipap    Vitals:  Blood pressure 111/62, pulse 103, resp  rate (!) 28, SpO2 97 %  Imaging and other studies: I have personally reviewed pertinent reports  O2 Device: bipap     Plan             Resp Comments: pt came into ED with resp distress copd exacerbation/CHF , pt was on nebulized tx, pt was put on bipap 12/6, 40%, bs are diminished with expiratory wheeze  pt takes home meds duoneb will order tx Q6 per resp protocol

## 2023-04-30 NOTE — ED ATTENDING ATTESTATION
4/30/2023  IWes DO, saw and evaluated the patient  I have discussed the patient with the resident/non-physician practitioner and agree with the resident's/non-physician practitioner's findings, Plan of Care, and MDM as documented in the resident's/non-physician practitioner's note, except where noted  All available labs and Radiology studies were reviewed  I was present for key portions of any procedure(s) performed by the resident/non-physician practitioner and I was immediately available to provide assistance  At this point I agree with the current assessment done in the Emergency Department  I have conducted an independent evaluation of this patient a history and physical is as follows:    Patient is a 53-year-old male with history of COPD, which is O2 and steroid-dependent, CHF, nonischemic cardiomyopathy, accompanied by his wife  3 4 days ago patient been having some increased cough, increased dyspnea compared with his baseline  Seen by PCP 2 days ago, increased his prednisone and started on Z-Pardeep  Today shortness of breath got worse  He has noticed a couple days of increasing pedal edema as well  Cough is occasionally productive of yellowish sputum  No chest pain, no palpitations  Wife indicates he is at his baseline mental status and no known sick contacts  Patient denies any prolonged travel history, no recent long travel, no immobilizations, or hospitalizations  General:  Patient appears ill  Head:  Atraumatic  Eyes:  Conjunctiva pink  ENT:  Mucous membranes are moist  Neck:  Supple  Cardiac:  S1-S2, without murmurs  Lungs: Bilateral wheeze, bilateral rhonchi  Abdomen:  Soft, nontender, normal bowel sounds, no CVA tenderness, no tympany, no rigidity, no guarding  Extremities: No calf asymmetry, bilateral trace pitting pedal edema  Neurologic:  Awake, fluent speech, normal comprehension  AAOx3     Skin:  Pink warm and dry  Psychiatric:  Alert, pleasant, cooperative            ED Course  ED Course as of 04/30/23 1803   Jourdan Velasquez Apr 30, 2023   1801 ECG interpreted by me, sinus rhythm, rate of 103, left bundle branch block, no acute change from February 17, 2023     Patient presented in worsening shortness of breath, increased respiratory effort, placed on noninvasive BiPAP ventilation    Chest x-ray interpreted me shows slight pulmonary congestion, previous chest x-ray in February 23, 2023 has a right basilar infiltrate which is not present today  Patient's initial CODE STATUS was DNR/DNI from February 2023 however he had decline in his condition enough that he did not seem to appreciate or understand the conversations regarding advanced directives  Wife indicated that she believes he may want to be a full code  Reassess several times, BiPAP settings increased  Patient became more somnolent  CO2 increased  Patient still protecting his airway, no indication for immediate intubation  Patient seen and evaluated by critical care, accepted to their service  DIAGNOSIS:  Acute respiratory failure, acute CHF exacerbation, acute COPD exacerbation    MEDICAL DECISION MAKING CODING    The differential diagnosis before testing included (but is not limited to) acute COPD exacerbation, acute CHF exacerbation, acute pneumothorax, acute viral syndrome, acute pneumonia and acute coronary syndrome which is a medical condition that poses a threat to life/function  Chronic conditions affecting care: As per HPI    COLLECTION AND INTERPRETATION OF DATA  Additional history obtained from: Wife  I reviewed prior external notes, including February 17, 2023 ECG, which showed chronic left bundle branch block      I ordered each unique test  Tests reviewed personally by me:  ECG: See my ED course  Labs: See above  Imaging: I independently reviewed the chest x-ray as noted above    Discussion with other providers: Dr Mark Gutierrez:  Patient admitted      Social Determinants of Health:  Presentation to ED outside of business hours or on night shift        Critical Care Time  Procedures  42 minutes critical care time    Please see separate procedure note for details

## 2023-04-30 NOTE — ED PROVIDER NOTES
"History  Chief Complaint   Patient presents with   • Shortness of Breath     For past 2 days - on 3L NC chronically     Patient is a 28-year-old male with a past medical history of COPD, chronic systolic CHF, and chronic hypoxic respiratory failure on 3 L via nasal cannula at baseline who was brought in by his family for evaluation with shortness of breath  Per patient's wife, he had been having increased shortness of breath and a cough for the past 2 days  Per chart review, patient was seen by his PCP on 4/28, and was started on a Z-Pardeep, Mucinex, and increased dose of steroids at that time  Patient then became acutely worse today, and finally agreed to come into the emergency department  Patient has not had any known fevers or chills  He states that his chest currently feels tight and he feels as though he is having trouble breathing  He also feels as though there is \"junk\" in his lungs  Prior to Admission Medications   Prescriptions Last Dose Informant Patient Reported? Taking? Diclofenac Sodium (VOLTAREN) 1 %   Yes No   Sig: APPLY 2 GRAMS 4 TIMES A DAY   Melatonin 5 MG CAPS   Yes No   Sig: Take 5 mg by mouth   Melatonin 5 MG TABS   Yes No   Sig: Take 1 tablet by mouth daily at bedtime   Patient not taking: Reported on 4/13/2023   acetaminophen (TYLENOL) 325 mg tablet   No No   Sig: Take 3 tablets (975 mg total) by mouth every 8 (eight) hours as needed for mild pain or moderate pain   albuterol (PROVENTIL HFA,VENTOLIN HFA) 90 mcg/act inhaler   No No   Sig: Inhale 2 puffs every 6 (six) hours as needed for wheezing   aspirin 81 mg chewable tablet   No No   Sig: Chew 1 tablet (81 mg total) daily   azithromycin (ZITHROMAX) 250 mg tablet   No No   Sig: Take 2 tablets today then 1 tablet daily x 4 days   budesonide (Pulmicort) 0 5 mg/2 mL nebulizer solution   No No   Sig: Take 2 mL (0 5 mg total) by nebulization 2 (two) times a day Rinse mouth after use     cyanocobalamin (VITAMIN B-12) 1000 MCG tablet   " No No   Sig: Take 1 tablet (1,000 mcg total) by mouth daily Do not start before March 1, 2023  docusate sodium (COLACE) 100 mg capsule   Yes No   Sig: Take 100 mg by mouth 2 (two) times a day   ergocalciferol (VITAMIN D2) 50,000 units   No No   Sig: TAKE 1 CAPSULE (50,000 UNITS TOTAL) BY MOUTH EVERY 28 DAYS   Patient not taking: Reported on 3/30/2023   escitalopram (LEXAPRO) 5 mg tablet   No No   Sig: Take 1 tablet (5 mg total) by mouth daily Do not start before March 1, 2023  ferrous sulfate 325 (65 Fe) mg tablet   No No   Sig: Take 1 tablet (325 mg total) by mouth every other day Do not start before March 1, 2023  Patient not taking: Reported on 3/30/2023   formoterol (PERFOROMIST) 20 MCG/2ML nebulizer solution   Yes No   Sig: TAKE 2 ML (20 MCG TOTAL) BY NEBULIZATION 2 (TWO) TIMES A DAY   guaiFENesin (MUCINEX) 600 mg 12 hr tablet   No No   Sig: Take 2 tablets (1,200 mg total) by mouth every 12 (twelve) hours   ipratropium (ATROVENT) 0 02 % nebulizer solution   No No   Sig: Take 2 5 mL (0 5 mg total) by nebulization 3 (three) times a day   ipratropium-albuterol (DUO-NEB) 0 5-2 5 mg/3 mL nebulizer solution   Yes No   Sig: Take 3 mL by nebulization 3 (three) times a day   levalbuterol (XOPENEX) 1 25 mg/0 5 mL nebulizer solution   No No   Sig: Take 0 5 mL (1 25 mg total) by nebulization 3 (three) times a day   losartan (COZAAR) 25 mg tablet   No No   Sig: Take 0 5 tablets (12 5 mg total) by mouth daily   predniSONE 5 mg tablet   No No   Sig: Take 1 tablet (5 mg total) by mouth daily Do not start before March 5, 2023     rosuvastatin (CRESTOR) 10 MG tablet   No No   Sig: Take 1 tablet (10 mg total) by mouth daily   senna-docusate sodium (SENOKOT S) 8 6-50 mg per tablet   No No   Sig: Take 1 tablet by mouth daily at bedtime   tamsulosin (FLOMAX) 0 4 mg   No No   Sig: Take 1 capsule (0 4 mg total) by mouth daily with dinner   torsemide (DEMADEX) 20 mg tablet   No No   Sig: Take 1 tablet (20 mg total) by mouth daily Facility-Administered Medications: None       Past Medical History:   Diagnosis Date   • Acute metabolic encephalopathy 8/41/2409   • Arthritis    • Bladder mass    • Cardiomyopathy [facility])    • Chest pain    • COPD (chronic obstructive pulmonary disease) (HCC)    • COVID-19 virus infection 2/23/2023   • CPAP (continuous positive airway pressure) dependence    • Emphysema of lung (HCC)    • Hypoxia     nocturnal   • Left bundle branch block    • Multiple pulmonary nodules     last assessed: 10/12/16   • Pneumonia    • Sleep apnea    • Sleep apnea, obstructive    • Smoker    • Weight loss 8/29/2019       Past Surgical History:   Procedure Laterality Date   • COLONOSCOPY     • CYSTOSCOPY     • CYSTOSCOPY  02/17/2021   • AK BLADDER INSTILLATION ANTICARCINOGENIC AGENT N/A 1/3/2020    Procedure: INSTILLATION MITOMYCIN;  Surgeon: Iban Evans MD;  [facility];  Service: Urology   • AK CYSTO W/REMOVAL OF LESIONS SMALL N/A 1/3/2020    Procedure: TRANSURETHRAL RESECTION OF BLADDER TUMOR (TURBT); Surgeon: Iban Evans MD;  [facility];  Service: Urology   • AK CYSTOURETHROSCOPY WITH BIOPSY N/A 4/13/2021    Procedure: Tolu Maldonado;  Surgeon: Iban Evans MD;  [facility];  Service: Urology   • AK TRURL ELECTROSURG RESCJ PROSTATE BLEED COMPLETE N/A 4/13/2021    Procedure: TRANSURETHRAL RESECTION OF PROSTATE (TURP) BLADDER BIOPSY, FULGURATION;  Surgeon: Iban Evans MD;  [facility];  Service: Urology       Family History   Problem Relation Age of Onset   • Diabetes Mother      I have reviewed and agree with the history as documented      E-Cigarette/Vaping   • E-Cigarette Use Never User      E-Cigarette/Vaping Substances   • Nicotine No    • THC No    • CBD No    • Flavoring No    • Other No    • Unknown No      Social History     Tobacco Use   • Smoking status: Former     Packs/day: 2 50     Years: 42 00     Pack years: 105 00     Types: Cigarettes Start date: 5     Quit date:      Years since quittin 3   • Smokeless tobacco: Former   • Tobacco comments:     1 ppd for 37 years, 2010 down to 5 cigs a day, is around second hand smoke   Vaping Use   • Vaping Use: Never used   Substance Use Topics   • Alcohol use: Not Currently     Comment: rarely   • Drug use: No        Review of Systems   Constitutional: Negative for fever  Respiratory: Positive for cough, chest tightness and shortness of breath  Cardiovascular: Positive for leg swelling  All other systems reviewed and are negative  Physical Exam  ED Triage Vitals   Temp Pulse Respirations Blood Pressure SpO2   -- 23 1744 23 1745 23 1744 23 1804    83 (!) 25 144/84 99 %      Temp src Heart Rate Source Patient Position - Orthostatic VS BP Location FiO2 (%)   -- 23 1804 23 1804 23 180 --    Monitor Sitting Right arm       Pain Score       23 185       No Pain             Orthostatic Vital Signs  Vitals:    23 1804 23 1856 23 1900 23 1915   BP: 111/62 (!) 89/59 93/60 103/66   Pulse: 103 98 98 98   Patient Position - Orthostatic VS: Sitting Lying Lying Lying       Physical Exam  Vitals and nursing note reviewed  Constitutional:       General: He is awake  He is in acute distress  Appearance: He is ill-appearing  HENT:      Head: Normocephalic and atraumatic  Eyes:      General: Vision grossly intact  Gaze aligned appropriately  Cardiovascular:      Rate and Rhythm: Regular rhythm  Tachycardia present  Heart sounds: Normal heart sounds  Pulmonary:      Effort: Tachypnea, accessory muscle usage and respiratory distress present  Breath sounds: Wheezing and rales present  Comments: Breath sounds equal bilaterally  Abdominal:      General: There is no distension  Musculoskeletal:      Cervical back: Full passive range of motion without pain and neck supple        Right lower le+ Pitting Edema present  Left lower le+ Pitting Edema present  Skin:     General: Skin is warm and dry  Comments: Cool distal extremities   Neurological:      General: No focal deficit present  Mental Status: He is alert and oriented to person, place, and time           ED Medications  Medications   levalbuterol (XOPENEX) inhalation solution 1 25 mg (1 25 mg Nebulization Given 23)   ipratropium (ATROVENT) 0 02 % inhalation solution 0 5 mg (0 5 mg Nebulization Given 23)   nitroGLYcerin (TRIDIL) 50 mg in 250 mL infusion (premix) (18 333 mcg/min Intravenous Rate/Dose Change 23)   ipratropium (ATROVENT) 0 02 % inhalation solution **ADS Override Pull** (0 5 mg  Given 23)   albuterol inhalation solution 2 5 mg (2 5 mg Nebulization Given 23)   albuterol inhalation solution 2 5 mg (2 5 mg Nebulization Given 23)   methylPREDNISolone sodium succinate (Solu-MEDROL) injection 125 mg (125 mg Intravenous Given 23)   albuterol (2 5 mg/3 mL) 0 083 % inhalation solution **ADS Override Pull** (10 mg  Given 23)   ipratropium (ATROVENT) 0 02 % inhalation solution **ADS Override Pull** (1 mg  Given 23)   albuterol inhalation solution 10 mg (0 mg Nebulization Override Pull 23)     And   ipratropium (ATROVENT) 0 02 % inhalation solution 1 mg (0 mg Nebulization Override Pull 23)     And   sodium chloride 0 9 % inhalation solution 3 mL (0 mL Nebulization Override Pull 23)       Diagnostic Studies  Results Reviewed     Procedure Component Value Units Date/Time    Procalcitonin [[id number]]  (Normal) Collected: 23    Lab Status: Final result Specimen: Blood from Arm, Right Updated: 23     Procalcitonin 0 10 ng/ml     Blood gas, venous [[id number]]  (Abnormal) Collected: 23    Lab Status: Final result Specimen: Blood from Arm, Right Updated: 23 1840     pH, Oscar 7 404     pCO2, Oscar 67 1 mm Hg      pO2, Oscar 72 3 mm Hg      HCO3, Oscar 41 0 mmol/L      Base Excess, Oscar 13 4 mmol/L      O2 Content, Oscar 15 4 ml/dL      O2 HGB, VENOUS 86 4 %     HS Troponin 0hr (reflex protocol) [[id number]]  (Normal) Collected: 04/30/23 1804    Lab Status: Final result Specimen: Blood from Arm, Right Updated: 04/30/23 1838     hs TnI 0hr 34 ng/L     HS Troponin I 2hr [[id number]]     Lab Status: No result Specimen: Blood     Comprehensive metabolic panel [[id number]]  (Abnormal) Collected: 04/30/23 1804    Lab Status: Final result Specimen: Blood from Arm, Right Updated: 04/30/23 1838     Sodium 129 mmol/L      Potassium 4 7 mmol/L      Chloride 86 mmol/L      CO2 42 mmol/L      ANION GAP 1 mmol/L      BUN 37 mg/dL      Creatinine 0 91 mg/dL      Glucose 152 mg/dL      Calcium 9 7 mg/dL      Corrected Calcium 10 3 mg/dL      AST 35 U/L      ALT 23 U/L      Alkaline Phosphatase 68 U/L      Total Protein 6 6 g/dL      Albumin 3 3 g/dL      Total Bilirubin 0 44 mg/dL      eGFR 87 ml/min/1 73sq m     Narrative:      [place] guidelines for Chronic Kidney Disease (CKD):   •  Stage 1 with normal or high GFR (GFR > 90 mL/min/1 73 square meters)  •  Stage 2 Mild CKD (GFR = 60-89 mL/min/1 73 square meters)  •  Stage 3A Moderate CKD (GFR = 45-59 mL/min/1 73 square meters)  •  Stage 3B Moderate CKD (GFR = 30-44 mL/min/1 73 square meters)  •  Stage 4 Severe CKD (GFR = 15-29 mL/min/1 73 square meters)  •  Stage 5 End Stage CKD (GFR <15 mL/min/1 73 square meters)  Note: GFR calculation is accurate only with a steady state creatinine    NT-BNP [facility] only [[id number]]  (Abnormal) Collected: 04/30/23 1804    Lab Status: Final result Specimen: Blood from Arm, Right Updated: 04/30/23 1838     NT-proBNP 17,569 pg/mL     CBC and differential [[id number]]  (Abnormal) Collected: 04/30/23 1804    Lab Status: Final result Specimen: Blood from Arm, Right Updated: 04/30/23 1813     WBC 7 16 Thousand/uL RBC 3 82 Million/uL      Hemoglobin 12 1 g/dL      Hematocrit 39 5 %       fL      MCH 31 7 pg      MCHC 30 6 g/dL      RDW 12 0 %      MPV 9 5 fL      Platelets 948 Thousands/uL      nRBC 0 /100 WBCs      Neutrophils Relative 69 %      Immat GRANS % 1 %      Lymphocytes Relative 16 %      Monocytes Relative 14 %      Eosinophils Relative 0 %      Basophils Relative 0 %      Neutrophils Absolute 4 96 Thousands/µL      Immature Grans Absolute 0 05 Thousand/uL      Lymphocytes Absolute 1 15 Thousands/µL      Monocytes Absolute 0 97 Thousand/µL      Eosinophils Absolute 0 01 Thousand/µL      Basophils Absolute 0 02 Thousands/µL                  XR chest 1 view portable    (Results Pending)         Procedures  ECG 12 Lead Documentation Only    Date/Time: 4/30/2023 6:06 PM  Performed by: Robb Sharma DO  Authorized by: Robb Sharma DO     Indications / Diagnosis:  Hypoxia, chest tightness  ECG reviewed by me, the ED Provider: yes    Patient location:  ED  Previous ECG:     Previous ECG:  Compared to current    Similarity:  No change  Interpretation:     Interpretation: abnormal    Quality:     Tracing quality:  Limited by artifact  Rate:     ECG rate:  103    ECG rate assessment: tachycardic    Rhythm:     Rhythm: sinus tachycardia    Ectopy:     Ectopy: PAC    QRS:     QRS axis:  Right    QRS intervals: Wide  Conduction:     Conduction: abnormal      Abnormal conduction: complete LBBB    ST segments:     ST segments:  Non-specific  T waves:     T waves: non-specific            ED Course  ED Course as of 04/30/23 2043   Sun Apr 30, 2023   1809 SpO2: 99 %  Was 70% on home 3L  Transitioned to NRB with improvement  BIPAP ordered for increased work of breathing  1840 NT-proBNP(!): 17,569   1841 pCO2, Oscar(!): 67 1   1841 pH, Oscar(!): 7 404   1921 On reevaluation, patient resting comfortably on BIPAP  Respiratory rate improved from 40's to 30's  Patient tired but is arousable   Nitro drip at a rate of 15 with stable blood pressures  1943 BIPAP changed from 14/6 to 15/5 with improvement in tidal volumes from 250's to 400  Resp rate still high in the 30's-40's  Called PACS for critical care vs SD1 admission at this time  2025 pCO2, Oscar(!): 90 9                                       Medical Decision Making  79-year-old male brought in by family for evaluation with worsening shortness of breath  On exam, patient in acute respiratory distress with tachypnea and accessory muscle usage  Initial pulse ox was 70% on patient's home O2 of 3 L via nasal cannula  Patient was immediately placed on a nonrebreather at 15 L/min with improvement in oxygen saturations to the high 90s  Given his increased work of breathing, respiratory was called to place patient on BiPAP  Patient with wheezing and Rales bilaterally, equal lung sounds bilaterally  Given the potential for an aspect of COPD exacerbation, patient was ordered steroids and nebulizer treatments  Given the concern for possible fluid overload, patient was also started on a nitroglycerin drip  Differential diagnosis includes, but is not limited to, COPD exacerbation, acute on chronic CHF, pneumonia, ACS, or other acute cardiopulmonary abnormalities  Patient evaluated with CBC, CMP, BNP, troponin, EKG, chest x-ray, and procalcitonin level  Will check VBG to evaluate for hypercarbic respiratory failure  Patient's labs significant for a proBNP level of 17,500  Procalcitonin level negative, and CBC unremarkable  Patient noted to be hyponatremic, but per chart review he has a history of similar lab values  Patient's VBG did reveal elevated carbon dioxide level, but pH is alkalotic so patient is likely chronically hypercarbic with metabolic compensation  EKG relatively unchanged from previous, and troponin mildly elevated although this is likely secondary to demand    Chest x-ray with vascular congestion, without signs of pleural effusions, pneumothorax, or pneumonia  Procalcitonin level also negative which supports against the diagnosis of pneumonia  While in the emergency department, patient became less responsive to stimuli  Repeat VBG was obtained which showed worsening hypercarbia, although this may be delayed in comparison to actual clinical status  Patient's respiratory rate decreased secondary to decreased level responsiveness, so after speaking with critical care attending the patient's rate on the BiPAP was increased  Spoke with patient's wife regarding patient's code status at this time  Patient's wife would like him to be a full code for the time being  Critical care team was made aware  Given that patient has a high probability of requiring endotracheal intubation or further airway management, patient was admitted to the medical ICU for further evaluation and treatment  Acute on chronic respiratory failure with hypoxia and hypercapnia ([facility] ): acute illness or injury  Acute on chronic systolic CHF (congestive heart failure) ([facility] ): acute illness or injury  COPD exacerbation ([facility] ): acute illness or injury  Amount and/or Complexity of Data Reviewed  External Data Reviewed: notes  Labs: ordered  Decision-making details documented in ED Course  Radiology: ordered  ECG/medicine tests: ordered and independent interpretation performed  Risk  Prescription drug management  Decision regarding hospitalization              Disposition  Final diagnoses:   COPD exacerbation ([facility] )   Acute on chronic respiratory failure with hypoxia and hypercapnia (HCC)   Acute on chronic systolic CHF (congestive heart failure) ([facility] )     Time reflects when diagnosis was documented in both MDM as applicable and the Disposition within this note     Time User Action Codes Description Comment    4/30/2023  7:44 PM Abel Lan [J44 1] COPD exacerbation ([facility] )     4/30/2023  7:45 PM Abel Lan [I50 22] Chronic HFrEF (heart failure with reduced ejection fraction) ([facility] )     4/30/2023  7:45 PM Sandra Mari Add [J96 21,  J96 22] Acute on chronic respiratory failure with hypoxia and hypercapnia (HCC)     4/30/2023  7:45 PM Sandra Guerra Modify [J44 1] COPD exacerbation ([facility] )     4/30/2023  7:45 PM Ulises Vidal Sat [E74 43,  J96 22] Acute on chronic respiratory failure with hypoxia and hypercapnia ([facility] )     4/30/2023  7:45 PM Garon Decant [Q12 76,  J96 22] Acute on chronic respiratory failure with hypoxia and hypercapnia (HCC)     4/30/2023  7:45 PM Nubia Vidal Remove [I50 22] Chronic HFrEF (heart failure with reduced ejection fraction) ([facility] )     4/30/2023  7:46 PM Sandra Guerra Add [I50 23] Acute on chronic systolic CHF (congestive heart failure) [facility])       ED Disposition     ED Disposition   Admit    Condition   Stable    Date/Time   Sun Apr 30, 2023  8:43 PM    Comment   Case was discussed with Critical Care and the patient's admission status was agreed to be Admission Status: inpatient status to the service of Dr Wanda Smith  Follow-up Information    None         Patient's Medications   Discharge Prescriptions    No medications on file     No discharge procedures on file  PDMP Review       Value Time User    PDMP Reviewed  Yes 2/12/2023 10:49 AM Jocelynn Chaney MD           ED Provider  Attending physically available and evaluated Felix Corcoran I managed the patient along with the ED Attending      Electronically Signed by         Maurice Be DO  04/30/23 8693

## 2023-04-30 NOTE — RESPIRATORY THERAPY NOTE
Resp care    04/30/23 8234   Respiratory Assessment   Assessment Type Assess only   General Appearance Lethargic   Respiratory Pattern Labored;Dyspnea at rest   Chest Assessment Chest expansion symmetrical   Bilateral Breath Sounds Diminished; Expiratory wheezes   Resp Comments pt came into ED with resp distress copd exacerbation/CHF , pt was on nebulized tx, pt was put on bipap 12/6, 40%, bs are diminished with expiratory wheeze  pt takes home medRevTraxb will order tx Q6 per resp protocol     O2 Device bipap   Non-Invasive Information   O2 Interface Device Face mask   Non-Invasive Ventilation Mode BiPAP   $ Continous NIV Initial   SpO2 97 %   $ Pulse Oximetry Spot Check Charge Completed   Non-Invasive Settings   IPAP (cm) 12 cm   EPAP (cm) 6 cm   Rate (Set) 12   FiO2 (%) 40   Rise Time 2   Inspiratory Time (Set) 0 9   Temperature (Set) 31   Non-Invasive Readings   Skin Intervention Skin intact   Total Rate 35   Vt (mL) (Mech) 130   MV (Mech) 5 5   Peak Pressure (Obs) 15   I/E Ratio (Obs) 1/4   Heater Temperature (Obs) 31   Leak (lpm) 22   Non-Invasive Alarms   Insp Pressure High (cm H20) 30   Insp Pressure Low (cm H20) 5   MV High (L/min) 3   MV Low (L/min) 100   Vt High (mL) 1500   Vt Low (mL) 100   High Resp Rate (BPM) 40 BPM   Low Resp Rate (BPM) 8 BPM   Apnea Interval (sec) 20   Maintenance   Water bag changed Yes

## 2023-05-01 ENCOUNTER — APPOINTMENT (INPATIENT)
Dept: NON INVASIVE DIAGNOSTICS | Facility: HOSPITAL | Age: 68
End: 2023-05-01

## 2023-05-01 LAB
AORTIC ROOT: 3.6 CM
AORTIC VALVE MEAN VELOCITY: 6.5 M/S
APICAL FOUR CHAMBER EJECTION FRACTION: 22 %
ARTERIAL PATENCY WRIST A: YES
ATRIAL RATE: 103 BPM
ATRIAL RATE: 96 BPM
AV AREA BY CONTINUOUS VTI: 2.2 CM2
AV AREA PEAK VELOCITY: 3 CM2
AV LVOT MEAN GRADIENT: 2 MMHG
AV LVOT PEAK GRADIENT: 3 MMHG
AV MEAN GRADIENT: 2 MMHG
AV PEAK GRADIENT: 3 MMHG
AV VALVE AREA: 2.2 CM2
AV VELOCITY RATIO: 0.96
B PARAP IS1001 DNA NPH QL NAA+NON-PROBE: NOT DETECTED
B PERT.PT PRMT NPH QL NAA+NON-PROBE: NOT DETECTED
BASE EX.OXY STD BLDV CALC-SCNC: 43 % (ref 60–80)
BASE EX.OXY STD BLDV CALC-SCNC: 47.5 % (ref 60–80)
BASE EXCESS BLDA CALC-SCNC: 13.6 MMOL/L
BASE EXCESS BLDA CALC-SCNC: 15.5 MMOL/L
BASE EXCESS BLDA CALC-SCNC: 16.4 MMOL/L
BASE EXCESS BLDV CALC-SCNC: 20.8 MMOL/L
BASE EXCESS BLDV CALC-SCNC: 22.8 MMOL/L
BASOPHILS # BLD AUTO: 0 THOUSANDS/ÂΜL (ref 0–0.1)
BASOPHILS NFR BLD AUTO: 0 % (ref 0–1)
BODY TEMPERATURE: 98.8 DEGREES FEHRENHEIT
BUN SERPL-MCNC: 34 MG/DL (ref 5–25)
C PNEUM DNA NPH QL NAA+NON-PROBE: NOT DETECTED
CALCIUM SERPL-MCNC: 9.4 MG/DL (ref 8.3–10.1)
CHLORIDE SERPL-SCNC: 87 MMOL/L (ref 96–108)
CO2 SERPL-SCNC: >45 MMOL/L (ref 21–32)
CREAT SERPL-MCNC: 0.79 MG/DL (ref 0.6–1.3)
DOP CALC AO PEAK VEL: 0.91 M/S
DOP CALC AO VTI: 16.15 CM
DOP CALC LVOT AREA: 3.14 CM2
DOP CALC LVOT DIAMETER: 2 CM
DOP CALC LVOT PEAK VEL VTI: 11.32 CM
DOP CALC LVOT PEAK VEL: 0.87 M/S
DOP CALC LVOT STROKE INDEX: 26.1 ML/M2
DOP CALC LVOT STROKE VOLUME: 35.54
EOSINOPHIL # BLD AUTO: 0 THOUSAND/ÂΜL (ref 0–0.61)
EOSINOPHIL NFR BLD AUTO: 0 % (ref 0–6)
ERYTHROCYTE [DISTWIDTH] IN BLOOD BY AUTOMATED COUNT: 12.1 % (ref 11.6–15.1)
FLUAV RNA NPH QL NAA+NON-PROBE: NOT DETECTED
FLUBV RNA NPH QL NAA+NON-PROBE: NOT DETECTED
FRACTIONAL SHORTENING: 9 (ref 28–44)
GFR SERPL CREATININE-BSD FRML MDRD: 93 ML/MIN/1.73SQ M
GLUCOSE SERPL-MCNC: 145 MG/DL (ref 65–140)
HADV DNA NPH QL NAA+NON-PROBE: NOT DETECTED
HCO3 BLDA-SCNC: 43.5 MMOL/L (ref 22–28)
HCO3 BLDA-SCNC: 44.2 MMOL/L (ref 22–28)
HCO3 BLDA-SCNC: 45.6 MMOL/L (ref 22–28)
HCO3 BLDV-SCNC: 53.5 MMOL/L (ref 24–30)
HCO3 BLDV-SCNC: 54.9 MMOL/L (ref 24–30)
HCOV 229E RNA NPH QL NAA+NON-PROBE: NOT DETECTED
HCOV HKU1 RNA NPH QL NAA+NON-PROBE: NOT DETECTED
HCOV NL63 RNA NPH QL NAA+NON-PROBE: NOT DETECTED
HCOV OC43 RNA NPH QL NAA+NON-PROBE: NOT DETECTED
HCT VFR BLD AUTO: 38.4 % (ref 36.5–49.3)
HGB BLD-MCNC: 11.3 G/DL (ref 12–17)
HMPV RNA NPH QL NAA+NON-PROBE: NOT DETECTED
HPIV1 RNA NPH QL NAA+NON-PROBE: NOT DETECTED
HPIV2 RNA NPH QL NAA+NON-PROBE: NOT DETECTED
HPIV3 RNA NPH QL NAA+NON-PROBE: NOT DETECTED
HPIV4 RNA NPH QL NAA+NON-PROBE: NOT DETECTED
IMM GRANULOCYTES # BLD AUTO: 0.02 THOUSAND/UL (ref 0–0.2)
IMM GRANULOCYTES NFR BLD AUTO: 1 % (ref 0–2)
INTERVENTRICULAR SEPTUM IN DIASTOLE (PARASTERNAL SHORT AXIS VIEW): 0.8 CM
INTERVENTRICULAR SEPTUM: 0.8 CM (ref 0.6–1.1)
IPAP: 15
IPAP: 16
IPAP: 18
L PNEUMO1 AG UR QL IA.RAPID: NEGATIVE
LAAS-AP2: 14.8 CM2
LAAS-AP4: 16.6 CM2
LEFT ATRIUM AREA SYSTOLE SINGLE PLANE A4C: 15.5 CM2
LEFT ATRIUM SIZE: 3.1 CM
LEFT INTERNAL DIMENSION IN SYSTOLE: 4.9 CM (ref 2.1–4)
LEFT VENTRICLE DIASTOLIC VOLUME (MOD BIPLANE): 99 ML
LEFT VENTRICLE SYSTOLIC VOLUME (MOD BIPLANE): 78 ML
LEFT VENTRICULAR INTERNAL DIMENSION IN DIASTOLE: 5.4 CM (ref 3.5–6)
LEFT VENTRICULAR POSTERIOR WALL IN END DIASTOLE: 0.8 CM
LEFT VENTRICULAR STROKE VOLUME: 28 ML
LV EF: 21 %
LVSV (TEICH): 28 ML
LYMPHOCYTES # BLD AUTO: 0.26 THOUSANDS/ÂΜL (ref 0.6–4.47)
LYMPHOCYTES NFR BLD AUTO: 9 % (ref 14–44)
M PNEUMO DNA NPH QL NAA+NON-PROBE: NOT DETECTED
MCH RBC QN AUTO: 30.7 PG (ref 26.8–34.3)
MCHC RBC AUTO-ENTMCNC: 29.4 G/DL (ref 31.4–37.4)
MCV RBC AUTO: 104 FL (ref 82–98)
MONOCYTES # BLD AUTO: 0.03 THOUSAND/ÂΜL (ref 0.17–1.22)
MONOCYTES NFR BLD AUTO: 1 % (ref 4–12)
NEUTROPHILS # BLD AUTO: 2.63 THOUSANDS/ÂΜL (ref 1.85–7.62)
NEUTS SEG NFR BLD AUTO: 89 % (ref 43–75)
NON VENT- BIPAP: ABNORMAL
NRBC BLD AUTO-RTO: 0 /100 WBCS
O2 CT BLDA-SCNC: 15 ML/DL (ref 16–23)
O2 CT BLDA-SCNC: 15.1 ML/DL (ref 16–23)
O2 CT BLDA-SCNC: 15.4 ML/DL (ref 16–23)
O2 CT BLDV-SCNC: 7.1 ML/DL
O2 CT BLDV-SCNC: 8.3 ML/DL
OTHER FIO2: ABNORMAL %
OXYHGB MFR BLDA: 90.6 % (ref 94–97)
OXYHGB MFR BLDA: 90.9 % (ref 94–97)
OXYHGB MFR BLDA: 92.7 % (ref 94–97)
P AXIS: 80 DEGREES
P AXIS: 83 DEGREES
PCO2 BLD: 122 MM HG (ref 42–50)
PCO2 BLDA: 73.3 MM HG (ref 36–44)
PCO2 BLDA: 84.7 MM HG (ref 36–44)
PCO2 BLDA: 95.9 MM HG (ref 36–44)
PCO2 BLDV: 118.2 MM HG (ref 42–50)
PCO2 BLDV: 121.5 MM HG (ref 42–50)
PEEP MAX SETTING VENT: 5 CM[H2O]
PH BLD: 7.26 [PH] (ref 7.3–7.4)
PH BLDA: 7.28 [PH] (ref 7.35–7.45)
PH BLDA: 7.35 [PH] (ref 7.35–7.45)
PH BLDA: 7.39 [PH] (ref 7.35–7.45)
PH BLDV: 7.26 [PH] (ref 7.3–7.4)
PH BLDV: 7.29 [PH] (ref 7.3–7.4)
PLATELET # BLD AUTO: 223 THOUSANDS/UL (ref 149–390)
PMV BLD AUTO: 9.2 FL (ref 8.9–12.7)
PO2 BLDA: 75.6 MM HG (ref 75–129)
PO2 BLDA: 77.2 MM HG (ref 75–129)
PO2 BLDA: 87 MM HG (ref 75–129)
PO2 BLDV: 27.4 MM HG (ref 35–45)
PO2 BLDV: 30 MM HG (ref 35–45)
PO2 VENOUS TEMP CORRECTED: 30.2 MM HG (ref 35–45)
POTASSIUM SERPL-SCNC: 4.6 MMOL/L (ref 3.5–5.3)
PR INTERVAL: 150 MS
PR INTERVAL: 150 MS
QRS AXIS: 104 DEGREES
QRS AXIS: 99 DEGREES
QRSD INTERVAL: 125 MS
QRSD INTERVAL: 126 MS
QT INTERVAL: 330 MS
QT INTERVAL: 363 MS
QTC INTERVAL: 432 MS
QTC INTERVAL: 459 MS
RA PRESSURE ESTIMATED: 8 MMHG
RBC # BLD AUTO: 3.68 MILLION/UL (ref 3.88–5.62)
RIGHT ATRIUM AREA SYSTOLE A4C: 12 CM2
RIGHT VENTRICLE ID DIMENSION: 3.7 CM
RSV RNA NPH QL NAA+NON-PROBE: NOT DETECTED
RV PSP: 30 MMHG
RV+EV RNA NPH QL NAA+NON-PROBE: DETECTED
S PNEUM AG UR QL: NEGATIVE
SARS-COV-2 RNA NPH QL NAA+NON-PROBE: NOT DETECTED
SL CV LEFT ATRIUM LENGTH A2C: 4 CM
SL CV LV EF: 20
SL CV PED ECHO LEFT VENTRICLE DIASTOLIC VOLUME (MOD BIPLANE) 2D: 144 ML
SL CV PED ECHO LEFT VENTRICLE SYSTOLIC VOLUME (MOD BIPLANE) 2D: 115 ML
SODIUM 24H UR-SCNC: <5 MOL/L
SODIUM SERPL-SCNC: 132 MMOL/L (ref 135–147)
SPECIMEN SOURCE: ABNORMAL
T WAVE AXIS: -67 DEGREES
T WAVE AXIS: 9 DEGREES
TR MAX PG: 22 MMHG
TR PEAK VELOCITY: 2.4 M/S
TRICUSPID ANNULAR PLANE SYSTOLIC EXCURSION: 1.1 CM
TRICUSPID VALVE PEAK REGURGITATION VELOCITY: 2.37 M/S
VENT BIPAP FIO2: 40 %
VENTRICULAR RATE: 103 BPM
VENTRICULAR RATE: 96 BPM
WBC # BLD AUTO: 2.94 THOUSAND/UL (ref 4.31–10.16)

## 2023-05-01 RX ORDER — TAMSULOSIN HYDROCHLORIDE 0.4 MG/1
0.4 CAPSULE ORAL
Status: DISCONTINUED | OUTPATIENT
Start: 2023-05-01 | End: 2023-05-10 | Stop reason: HOSPADM

## 2023-05-01 RX ORDER — GUAIFENESIN 600 MG/1
1200 TABLET, EXTENDED RELEASE ORAL EVERY 12 HOURS SCHEDULED
Status: DISCONTINUED | OUTPATIENT
Start: 2023-05-01 | End: 2023-05-10 | Stop reason: HOSPADM

## 2023-05-01 RX ORDER — LANOLIN ALCOHOL/MO/W.PET/CERES
3 CREAM (GRAM) TOPICAL
Status: DISCONTINUED | OUTPATIENT
Start: 2023-05-01 | End: 2023-05-10 | Stop reason: HOSPADM

## 2023-05-01 RX ORDER — ERGOCALCIFEROL 1.25 MG/1
50000 CAPSULE ORAL
Status: DISCONTINUED | OUTPATIENT
Start: 2023-05-01 | End: 2023-05-10 | Stop reason: HOSPADM

## 2023-05-01 RX ORDER — PRAVASTATIN SODIUM 80 MG/1
80 TABLET ORAL
Status: DISCONTINUED | OUTPATIENT
Start: 2023-05-01 | End: 2023-05-10 | Stop reason: HOSPADM

## 2023-05-01 RX ORDER — TAMSULOSIN HYDROCHLORIDE 0.4 MG/1
0.4 CAPSULE ORAL
Status: DISCONTINUED | OUTPATIENT
Start: 2023-05-01 | End: 2023-05-01

## 2023-05-01 RX ORDER — CHOLECALCIFEROL (VITAMIN D3) 125 MCG
1 CAPSULE ORAL
Status: DISCONTINUED | OUTPATIENT
Start: 2023-05-01 | End: 2023-05-01

## 2023-05-01 RX ORDER — ASPIRIN 81 MG/1
81 TABLET, CHEWABLE ORAL DAILY
Status: DISCONTINUED | OUTPATIENT
Start: 2023-05-01 | End: 2023-05-10 | Stop reason: HOSPADM

## 2023-05-01 RX ADMIN — PRAVASTATIN SODIUM 80 MG: 80 TABLET ORAL at 18:23

## 2023-05-01 RX ADMIN — AZITHROMYCIN MONOHYDRATE 500 MG: 500 INJECTION, POWDER, LYOPHILIZED, FOR SOLUTION INTRAVENOUS at 22:54

## 2023-05-01 RX ADMIN — GUAIFENESIN 1200 MG: 600 TABLET, EXTENDED RELEASE ORAL at 22:35

## 2023-05-01 RX ADMIN — ERGOCALCIFEROL 50000 UNITS: 1.25 CAPSULE, LIQUID FILLED ORAL at 18:23

## 2023-05-01 RX ADMIN — METHYLPREDNISOLONE SODIUM SUCCINATE 40 MG: 40 INJECTION, POWDER, FOR SOLUTION INTRAMUSCULAR; INTRAVENOUS at 01:15

## 2023-05-01 RX ADMIN — TAMSULOSIN HYDROCHLORIDE 0.4 MG: 0.4 CAPSULE ORAL at 18:23

## 2023-05-01 RX ADMIN — IPRATROPIUM BROMIDE 0.5 MG: 0.5 SOLUTION RESPIRATORY (INHALATION) at 07:42

## 2023-05-01 RX ADMIN — IPRATROPIUM BROMIDE 0.5 MG: 0.5 SOLUTION RESPIRATORY (INHALATION) at 19:42

## 2023-05-01 RX ADMIN — CEFTRIAXONE SODIUM 1000 MG: 10 INJECTION, POWDER, FOR SOLUTION INTRAVENOUS at 00:41

## 2023-05-01 RX ADMIN — LEVALBUTEROL HYDROCHLORIDE 1.25 MG: 1.25 SOLUTION, CONCENTRATE RESPIRATORY (INHALATION) at 07:41

## 2023-05-01 RX ADMIN — ENOXAPARIN SODIUM 40 MG: 40 INJECTION SUBCUTANEOUS at 09:06

## 2023-05-01 RX ADMIN — IPRATROPIUM BROMIDE 0.5 MG: 0.5 SOLUTION RESPIRATORY (INHALATION) at 02:38

## 2023-05-01 RX ADMIN — BUDESONIDE 0.5 MG: 0.5 INHALANT ORAL at 07:41

## 2023-05-01 RX ADMIN — LEVALBUTEROL HYDROCHLORIDE 1.25 MG: 1.25 SOLUTION, CONCENTRATE RESPIRATORY (INHALATION) at 02:38

## 2023-05-01 RX ADMIN — METHYLPREDNISOLONE SODIUM SUCCINATE 40 MG: 40 INJECTION, POWDER, FOR SOLUTION INTRAMUSCULAR; INTRAVENOUS at 14:48

## 2023-05-01 RX ADMIN — ASPIRIN 81 MG CHEWABLE TABLET 81 MG: 81 TABLET CHEWABLE at 12:32

## 2023-05-01 RX ADMIN — METHYLPREDNISOLONE SODIUM SUCCINATE 40 MG: 40 INJECTION, POWDER, FOR SOLUTION INTRAMUSCULAR; INTRAVENOUS at 22:36

## 2023-05-01 RX ADMIN — IPRATROPIUM BROMIDE 0.5 MG: 0.5 SOLUTION RESPIRATORY (INHALATION) at 13:45

## 2023-05-01 RX ADMIN — CYANOCOBALAMIN TAB 500 MCG 1000 MCG: 500 TAB at 12:32

## 2023-05-01 RX ADMIN — LEVALBUTEROL HYDROCHLORIDE 1.25 MG: 1.25 SOLUTION, CONCENTRATE RESPIRATORY (INHALATION) at 19:41

## 2023-05-01 RX ADMIN — MELATONIN 3 MG: at 22:35

## 2023-05-01 RX ADMIN — LEVALBUTEROL HYDROCHLORIDE 1.25 MG: 1.25 SOLUTION, CONCENTRATE RESPIRATORY (INHALATION) at 13:45

## 2023-05-01 RX ADMIN — CHLORHEXIDINE GLUCONATE 0.12% ORAL RINSE 15 ML: 1.2 LIQUID ORAL at 09:06

## 2023-05-01 RX ADMIN — BUDESONIDE 0.5 MG: 0.5 INHALANT ORAL at 19:41

## 2023-05-01 RX ADMIN — FORMOTEROL FUMARATE DIHYDRATE 20 MCG: 20 SOLUTION RESPIRATORY (INHALATION) at 19:41

## 2023-05-01 RX ADMIN — CHLORHEXIDINE GLUCONATE 0.12% ORAL RINSE 15 ML: 1.2 LIQUID ORAL at 22:35

## 2023-05-01 NOTE — SOCIAL WORK
"LSW returned to patient's bedside to provide emotional support to patient and wife  Wife is very tearful regarding the current medical situation and his increased oxygen needs  Patient identifies that the reason for his current hospitalization is \"being unable to breath  \"  Wife states that they have had conversations regarding medical interventions and the limit that he has set in the past is under no circumstances does he want to be a resident in a nursing facility  Wife's goal is getting him to return to home  Patient and wife have two adopted children ages 23 and 16  Wife is concerned about the son's lack of understanding of patient's significant medical status       I have spent 45 minutes with Patient and family today in which greater than 50% of this time was spent in counseling/coordination of care     Palliative  will follow-up as requested by patient, family, and primary team   Please contact with any specific requests        "

## 2023-05-01 NOTE — CASE MANAGEMENT
Case Management Assessment & Discharge Planning Note    Patient name Osbaldo Liu    Location MICU 13/MICU 13 MRN [id number]  : 1955 Date 2023       Current Admission Date: 2023  Current Admission Diagnosis:COPD exacerbation [facility])   Patient Active Problem List    Diagnosis Date Noted   • COPD exacerbation ([place] [address] ) 2023   • Hyperglycemia 2023   • Physical deconditioning 2023   • Anemia 2023   • Chronic HFrEF (heart failure with reduced ejection fraction) ([place] [address] ) 2022   • Protein-calorie malnutrition ([place] [address] ) 2022   • Pulmonary nodules/lesions, multiple 2022   • Prostate cancer ([place] [address] ) 2021   • BPH with obstruction/lower urinary tract symptoms 2021   • Chronic respiratory failure with hypoxia and hypercapnia ([place] [address] ) 2020   • Macrocytosis without anemia 2019   • Other insomnia 2019   • Gastroesophageal reflux disease 2017   • Nonobstructive atherosclerosis of coronary artery 2016   • Mood disorder ([place] [address] ) 2015   • Impaired fasting glucose 2015   • Osteoporosis 10/14/2014   • Vitamin D deficiency 10/14/2014   • Nonischemic cardiomyopathy ([place] [address] ) 2014   • Left bundle-branch block 2013   • Osteoarthritis 2013   • KEVIN and COPD overlap syndrome ([place] [address] ) 2013   • Chronic obstructive pulmonary disease ([place] [address] ) 2012      LOS (days): 1  Geometric Mean LOS (GMLOS) (days):   Days to GMLOS:     OBJECTIVE:    Risk of Unplanned Readmission Score: 26 76         Current admission status: Inpatient       Preferred Pharmacy:   [id number]  [address]  Phone: [phone] Fax: [phone]    Primary Care Provider: Ekta Lewis DO    Primary Insurance: BLUE CROSS  Secondary Insurance: MEDICARE    ASSESSMENT:  [address] [facility], [address] [facility]   Primary Phone: [phone] [facility]  Home Phone: [phone]                         Readmission Root Cause  30 Day Readmission: No    Patient Information  Admitted from[de-identified] Home  Mental Status: Other (Comment), Alert (currently sleeping on BIPAP)  During Assessment patient was accompanied by: Spouse  Assessment information provided by[de-identified] Spouse  Primary Caregiver: Family  Caregiver's Name[de-identified] Kailee oNlasco, wife  Caregiver's Relationship to Patient[de-identified] Family Member  Caregiver's Telephone Number[de-identified] [phone]  Support Systems: Spouse/significant other  [place] of Residence: [address] [place] [address] do you live in?: Triplify entry access options   Select all that apply : Stairs  Number of steps to enter home : 3  Type of Current Residence: [place]  In the last 12 months, was there a time when you were not able to pay the mortgage or rent on time?: No  In the last 12 months, was there a time when you did not have a steady place to sleep or slept in a shelter (including now)?: No  Homeless/housing insecurity resource given?: N/A  Living Arrangements: Lives w/ Spouse/significant other  Is patient a ?: No    Activities of Daily Living Prior to Admission  Functional Status: Assistance  Completes ADLs independently?: No  Level of ADL dependence: Assistance (requires assistance with bathing/dressing)  Ambulates independently?: Yes  Does patient use assisted devices?: Yes  Assisted Devices (DME) used: Allyne Salvadorean, Wheelchair, Portable Oxygen concentrator, Home Oxygen concentrator, Shower Chair, Other (Comment) (raised toilet seat)  DME Company Name (respiratory supplies): [employer]  O2 Rate(s): 3L  Does patient currently own DME?: Yes  What DME does the patient currently own?: Portable Oxygen concentrator, Home Oxygen concentrator, Walker, Shower Chair, Wheelchair, Other (Comment) (raised toilet seat)  Does patient have a history of Outpatient Therapy (PT/OT)?: Yes  Does the patient have a history of Short-Term Rehab?: Yes [facility], February 2023)  Does patient have a history of HHC?: Yes ([facility] (briefly))  Does patient currently have [facility]?: No         Patient Information Continued  Income Source: Pension/skilled nursing  Does patient have prescription coverage?: Yes  Within the past 12 months, you worried that your food would run out before you got the money to buy more : Never true  Within the past 12 months, the food you bought just didn't last and you didn't have money to get more : Never true  Food insecurity resource given?: N/A  Does patient receive dialysis treatments?: No  Does patient have a history of substance abuse?: No  Does patient have a history of Mental Health Diagnosis?: No         Means of Transportation  Means of Transport to Appts[de-identified] Family transport  In the past 12 months, has lack of transportation kept you from medical appointments or from getting medications?: No  In the past 12 months, has lack of transportation kept you from meetings, work, or from getting things needed for daily living?: No  Was application for public transport provided?: N/A        DISCHARGE DETAILS:    Discharge planning discussed with[de-identified] patient's wife Jaci Walter at bedside  Freedom of Choice: Yes     CM contacted family/caregiver?: Yes  Were Treatment Team discharge recommendations reviewed with patient/caregiver?: Yes  Did patient/caregiver verbalize understanding of patient care needs?: Yes  Were patient/caregiver advised of the risks associated with not following Treatment Team discharge recommendations?: Yes    Contacts  Patient Contacts: Nisha Lugo, wife  Relationship to Patient[de-identified] Family  Contact Method: Phone  Phone Number: [phone]  Reason/Outcome: Continuity of Care, Emergency Contact, Discharge Planning                        Treatment Team Recommendation: Other (TBD)  Discharge Destination Plan[de-identified] Other (TBD -- awaiting recommendations)  Transport at Discharge : Other (Comment) (TBD)                   Patient/caregiver received discharge checklist   Content reviewed  Patient/caregiver encouraged to participate in discharge plan of care prior to discharge home  CM reviewed d/c planning process including the following: identifying help at home, patient preference for d/c planning needs, Discharge Lounge, [facility] Meds to Bed program, availability of treatment team to discuss questions or concerns patient and/or family may have regarding understanding medications and recognizing signs and symptoms once discharged  CM also encouraged patient to follow up with all recommended appointments after discharge  Patient advised of importance for patient and family to participate in managing patient’s medical well being  Additional Comments: Introduced self and role to wife Irina Arzate at bedside, patient sleeping/on BIPAP  Patient requires assistance with ADLs at home, ambulates with a walker, uses wheelchair and home O2 provided by Meg Gonzalez  Patient did have a recent stay at [facility] for rehab  Palliative care consulted  CM will continue to follow for d/c needs and place referrals as appropriate

## 2023-05-01 NOTE — UTILIZATION REVIEW
Initial Clinical Review    Admission: Date/Time/Statement:   Admission Orders (From admission, onward)     Ordered        04/30/23 2030  Inpatient Admission  Once                      Orders Placed This Encounter   Procedures   • Inpatient Admission     Standing Status:   Standing     Number of Occurrences:   1     Order Specific Question:   Level of Care     Answer:   Critical Care [15]     Order Specific Question:   Estimated length of stay     Answer:   More than 2 Midnights     Order Specific Question:   Certification     Answer:   I certify that inpatient services are medically necessary for this patient for a duration of greater than two midnights  See H&P and MD Progress Notes for additional information about the patient's course of treatment  ED Arrival Information     Expected   -    Arrival   4/30/2023 17:39    Acuity   Emergent            Means of arrival   Wheelchair    Escorted by   Family Member    Service   Hospitalist    Admission type   Emergency            Arrival complaint   shortness of breath           Chief Complaint   Patient presents with   • Shortness of Breath     For past 2 days - on 3L NC chronically       Initial Presentation: 79 y o  male with PMH of COPD and HF with EF 30% presented to the ED from home w/ SOB  Pt w/ increasing SOB for the past 2 days  He was seen by OP pulmonology last Friday and was told to inc his steroids and begin on Azithromycin  He has been taking the azithro but has only been continuing his normal 5mg steroid dose daily  He became increasingly SOB and less responsive today and was taken to the ED by family  In the ED, T 96 2, RR 24-41  BNP elevated, CXR showed hyperinflated lungs consistent with COPD, no obvious opacification/pneumonia process  Some increased cephalization on the left lung appreciated  Placed on BIPAP d/t hypoxia, treated for COPD, initiated nitro gtt due to concern for heart failure exacerbation   On exam, Not opening eyes, not following commands, not responding to noxious stimuli  Ill appearing, BIPAP in place, tachycardia, tachypnea, decreased air movement, Rhonchi, +2 pitting b/l LE edema  CO2 increasing on repeat VBGs  Admitted as Inpatient for acute hypoxic resp failure, COPD w/ acute exacerbation, HF w/ acute exacerbation, encephalopathy, metabolic alkalosis, hyponatremia  Plan: Increase BIPAP settings  Serial bld gases  Consider intubation if not improving/unable to protect airway  Rpt echo  Diuresis prn  Wean Nitro  Cont Xopenex/Atrovent nebs q6  Cont home pulmicort and formeterol  Cont Solu Medrol  Cont Azithromycin  Palliative consult  BMP q8h  Avoid fast Na correction  Urine osms and Na pending  NPO  Date: 05/01  Day 2:   Critical Care Notes: Pt's trop and p[rocal neg  WBC 2 94  Na improving  UA with WBC and bacteria, vial panel + for Rhino/enterovirus  He is hypotensive and tachycardic  Nitro dc'd  Cont BIPAP, wean as jose enrique  Cont nebs, solu medrol, azithromycin  Cont to mon BMP  NPO  PE: pt somnolent, ill appearing, BIPAP in place, tachycardia, +1 b/l LE edema present  Palliative Care Consult: GOC:  Patient this time would like to remain a full code but does understand that this likely will not lead to an outcome where he will be able to return home if he would require intubation  Hospice was discussed        ED Triage Vitals   Temperature Pulse Respirations Blood Pressure SpO2   04/30/23 2026 04/30/23 1744 04/30/23 1745 04/30/23 1744 04/30/23 1804   (!) 96 2 °F (35 7 °C) 83 (!) 25 144/84 99 %      Temp Source Heart Rate Source Patient Position - Orthostatic VS BP Location FiO2 (%)   04/30/23 2026 04/30/23 1804 04/30/23 1804 04/30/23 1804 --   Tympanic Monitor Sitting Right arm       Pain Score       04/30/23 1856       No Pain          Wt Readings from Last 1 Encounters:   05/01/23 47 6 kg (105 lb)     Additional Vital Signs:   Date/Time Temp Pulse Resp BP MAP (mmHg) SpO2 O2 Device O2 Interface Device Patient Position - Orthostatic VS   05/01/23 1200 -- 90 29 Abnormal  88/58 Abnormal  66 97 % -- -- --   05/01/23 1100 -- 92 31 Abnormal  93/64 72 97 % -- -- --   05/01/23 1000 -- 95 26 Abnormal  93/59 70 97 % -- -- --   05/01/23 0900 98 7 °F (37 1 °C) 88 25 Abnormal  90/61 69 97 % -- -- --   05/01/23 0800 -- 92 27 Abnormal  95/59 69 98 % -- -- --   05/01/23 0741 -- -- -- -- -- -- -- Face mask --   05/01/23 0600 -- 90 26 Abnormal  93/59 67 99 % -- -- --   05/01/23 0500 -- 88 26 Abnormal  93/58 67 99 % -- -- --   05/01/23 0403 -- -- -- -- -- 98 % -- Face mask --   05/01/23 0400 98 8 °F (37 1 °C) 94 27 Abnormal  121/76 95 98 % BiPAP -- Lying   05/01/23 0300 -- 82 16 81/55 Abnormal  62 Abnormal  98 % -- -- --   05/01/23 0238 -- -- -- -- -- 97 % -- -- --   05/01/23 0200 -- 88 27 Abnormal  95/59 69 99 % -- -- --   05/01/23 0100 -- 92 27 Abnormal  104/68 79 98 % -- -- --   05/01/23 0000 98 9 °F (37 2 °C) 98 38 Abnormal  105/68 79 98 % BiPAP -- Lying   04/30/23 2300 -- 96 34 Abnormal  83/62 Abnormal  68 97 % -- -- --   04/30/23 2245 -- 96 36 Abnormal  80/58 Abnormal  65 97 % -- -- --   04/30/23 2230 -- 98 39 Abnormal  91/62 72 97 % -- -- --   04/30/23 2215 -- 100 41 Abnormal  97/65 79 97 % -- -- --   04/30/23 2145 -- -- -- -- -- 97 % -- Face mask --   04/30/23 2137 -- 94 -- 106/67 84 97 % -- -- --   04/30/23 2115 99 5 °F (37 5 °C) 104 -- 110/71 83 97 % BiPAP -- Lying   04/30/23 2100 -- -- -- 113/75 88 -- -- -- --   04/30/23 2045 -- 88 35 Abnormal  101/62 77 97 % -- -- --   04/30/23 2026 96 2 °F (35 7 °C) Abnormal  -- -- -- -- -- -- -- --   04/30/23 2005 -- 86 35 Abnormal  92/57 69 98 % BiPAP -- Lying   04/30/23 1945 -- 92 28 Abnormal  96/58 72 98 % BiPAP -- Lying   04/30/23 1918 -- -- -- -- -- -- -- Face mask --   04/30/23 1915 -- 98 41 Abnormal  103/66 79 96 % BiPAP -- Lying   04/30/23 1900 -- 98 24 Abnormal  93/60 72 97 % BiPAP  -- Lying   04/30/23 1856 -- 98 28 Abnormal  89/59 Abnormal  -- 98 % BiPAP  -- Lying   04/30/23 1808 -- -- -- -- -- 97 % -- Face mask --   04/30/23 1804 -- 103 28 Abnormal  111/62 -- 99 % BiPAP  -- Sitting   04/30/23 1745 -- -- 25 Abnormal  -- -- -- -- -- --       Pertinent Labs/Diagnostic Test Results:   XR chest 1 view portable   Final Result by Stanley Lechuga MD (05/01 0941)      No acute cardiopulmonary disease                    Workstation performed: [id number]           Date and Time Eye Opening Best Verbal Response Best Motor Response Tessy Coma Scale Score   05/01/23 0800 4 5 6 15   05/01/23 0600 3 5 6 14   05/01/23 0400 3 5 6 14   05/01/23 0200 3 5 6 14   05/01/23 0000 4 5 6 15   04/30/23 2200 4 5 6 15   04/30/23 1756 4 5 6 15       Results from last 7 days   Lab Units 05/01/23  0027   SARS-COV-2  Not Detected     Results from last 7 days   Lab Units 05/01/23  0424 04/30/23  1804   WBC Thousand/uL 2 94* 7 16   HEMOGLOBIN g/dL 11 3* 12 1   HEMATOCRIT % 38 4 39 5   PLATELETS Thousands/uL 223 269   NEUTROS ABS Thousands/µL 2 63 4 96         Results from last 7 days   Lab Units 05/01/23 0430 04/30/23 2241 04/30/23  1804   SODIUM mmol/L 132* 132* 129*   POTASSIUM mmol/L 4 6 4 3 4 7   CHLORIDE mmol/L 87* 86* 86*   CO2 mmol/L >45* >45* 42*   ANION GAP mmol/L  --   --  1*   BUN mg/dL 34* 37* 37*   CREATININE mg/dL 0 79 0 72 0 91   EGFR ml/min/1 73sq m 93 97 87   CALCIUM mg/dL 9 4 9 5 9 7     Results from last 7 days   Lab Units 04/30/23  1804   AST U/L 35   ALT U/L 23   ALK PHOS U/L 68   TOTAL PROTEIN g/dL 6 6   ALBUMIN g/dL 3 3*   TOTAL BILIRUBIN mg/dL 0 44         Results from last 7 days   Lab Units 05/01/23  0430 04/30/23 2241 04/30/23  1804   GLUCOSE RANDOM mg/dL 145* 122 152*     Results from last 7 days   Lab Units 04/30/23 2006   OSMOLALITY, SERUM mmol/         Results from last 7 days   Lab Units 05/01/23  1007 05/01/23  0728 04/30/23  2340   PH ART  7 391 7 349* 7 281*   PCO2 ART mm Hg 73 3* 84 7* 95 9*   PO2 ART mm Hg 77 2 75 6 87 0   HCO3 ART mmol/L 43 5* 45 6* 44 2*   BASE EXC ART mmol/L 15 5 16 4 13 6   O2 CONTENT ART mL/dL 15 1* 15 0* 15 4*   O2 HGB, ARTERIAL % 92 7* 90 9* 90 6*   ABG SOURCE  Brachial, Right Brachial, Right Radial, Left     Results from last 7 days   Lab Units 05/01/23  0550 05/01/23  0424 04/30/23 2006   PH JHONATHAN  7 285* 7 262* 7 320   PCO2 JHONATHAN mm Hg 118 2* 121 5* 90 9*   PO2 JHONATHAN mm Hg 27 4* 30 0* 47 4*   HCO3 JHONATHAN mmol/L 54 9* 53 5* 45 8*   BASE EXC JHONATHAN mmol/L 22 8 20 8 15 8   O2 CONTENT JHONATHAN ml/dL 7 1 8 3 12 6   O2 HGB, VENOUS % 43 0* 47 5* 75 0             Results from last 7 days   Lab Units 04/30/23 2006 04/30/23  1804   HS TNI 0HR ng/L  --  34   HS TNI 2HR ng/L 31  --    HSTNI D2 ng/L -3  --                  Results from last 7 days   Lab Units 04/30/23  1804   PROCALCITONIN ng/ml 0 10                 Results from last 7 days   Lab Units 04/30/23  1804   NT-PRO BNP pg/mL 17,569*                         Results from last 7 days   Lab Units 04/30/23  2233 04/30/23 2006   OSMOLALITY, SERUM mmol/KG  --  292   OSMO UR mmol/  --      Results from last 7 days   Lab Units 04/30/23 2234   CLARITY UA  Turbid   COLOR UA  Yellow   SPEC GRAV UA  1 019   PH UA  6 5   GLUCOSE UA mg/dl Negative   KETONES UA mg/dl Negative   BLOOD UA  Small*   PROTEIN UA mg/dl 30 (1+)*   NITRITE UA  Negative   BILIRUBIN UA  Negative   UROBILINOGEN UA (BE) mg/dl 2 0*   LEUKOCYTES UA  Large*   WBC UA /hpf Innumerable*   RBC UA /hpf 1-2   BACTERIA UA /hpf Innumerable*   EPITHELIAL CELLS WET PREP /hpf Occasional     Results from last 7 days   Lab Units 05/01/23 0027 04/30/23 2233   STREP PNEUMONIAE ANTIGEN, URINE   --  Negative   LEGIONELLA URINARY ANTIGEN   --  Negative   RESPIRATORY SYNCYTIAL VIRUS  Not Detected  --      Results from last 7 days   Lab Units 05/01/23 0027   ADENOVIRUS  Not Detected   BORDETELLA PARAPERTUSSIS  Not Detected   BORDETELLA PERTUSSIS  Not Detected   CHLAMYDIA PNEUMONIAE  Not Detected   CORONAVIRUS 229E  Not Detected   CORONAVIRUS HKU1  Not Detected   CORONAVIRUS NL63  Not Detected CORONAVIRUS OC43  Not Detected   METAPNEUMOVIRUS  Not Detected   RHINOVIRUS  Detected*   MYCOPLASMA PNEUMONIAE  Not Detected   PARAINFLUENZA 1  Not Detected   PARAINFLUENZA 2  Not Detected   PARAINFLUENZA 3  Not Detected   PARAINFLUENZA 4  Not Detected                         Results from last 7 days   Lab Units 05/01/23  0038 05/01/23  0030   BLOOD CULTURE  Received in Microbiology Lab  Culture in Progress  Received in Microbiology Lab  Culture in Progress                 ED Treatment:   Medication Administration from 04/30/2023 1739 to 04/30/2023 2111       Date/Time Order Dose Route Action     04/30/2023 1753 EDT ipratropium (ATROVENT) 0 02 % inhalation solution **ADS Override Pull** 0 5 mg  Given     04/30/2023 1753 EDT albuterol inhalation solution 2 5 mg 2 5 mg Nebulization Given     04/30/2023 1759 EDT albuterol inhalation solution 2 5 mg 2 5 mg Nebulization Given     04/30/2023 1836 EDT methylPREDNISolone sodium succinate (Solu-MEDROL) injection 125 mg 125 mg Intravenous Given     04/30/2023 1914 EDT levalbuterol (Lestine Hanly) inhalation solution 1 25 mg 1 25 mg Nebulization Given     04/30/2023 1914 EDT ipratropium (ATROVENT) 0 02 % inhalation solution 0 5 mg 0 5 mg Nebulization Given     04/30/2023 1826 EDT albuterol (2 5 mg/3 mL) 0 083 % inhalation solution **ADS Override Pull** 10 mg  Given     04/30/2023 1827 EDT ipratropium (ATROVENT) 0 02 % inhalation solution **ADS Override Pull** 1 mg  Given     04/30/2023 1919 EDT nitroGLYcerin (TRIDIL) 50 mg in 250 mL infusion (premix) 18 333 mcg/min Intravenous Rate/Dose Change     04/30/2023 1903 EDT nitroGLYcerin (TRIDIL) 50 mg in 250 mL infusion (premix) 15 mcg/min Intravenous Rate/Dose Change     04/30/2023 1844 EDT nitroGLYcerin (TRIDIL) 50 mg in 250 mL infusion (premix) 20 mcg/min Intravenous New Bag     04/30/2023 1832 EDT albuterol inhalation solution 10 mg 0 mg Nebulization Override Pull     04/30/2023 8145 EDT ipratropium (ATROVENT) 0 02 % inhalation solution 1 mg 0 mg Nebulization Override Pull     04/30/2023 1832 EDT sodium chloride 0 9 % inhalation solution 3 mL 0 mL Nebulization Override Pull        Past Medical History:   Diagnosis Date   • Acute metabolic encephalopathy 5/94/5818   • Arthritis    • Bladder mass    • Cardiomyopathy [facility])    • Chest pain    • COPD (chronic obstructive pulmonary disease) ([facility])    • COVID-19 virus infection 2/23/2023   • CPAP (continuous positive airway pressure) dependence    • Emphysema of lung ([facility])    • Hypoxia     nocturnal   • Left bundle branch block    • Multiple pulmonary nodules     last assessed: 10/12/16   • Pneumonia    • Sleep apnea    • Sleep apnea, obstructive    • Smoker    • Weight loss 8/29/2019     Present on Admission:  • BPH with obstruction/lower urinary tract symptoms  • COPD exacerbation ([facility])  • Chronic respiratory failure with hypoxia and hypercapnia ([facility])      Admitting Diagnosis: Shortness of breath [R06 02]  COPD exacerbation ([facility]) [J44 1]  Acute on chronic systolic CHF (congestive heart failure) ([facility]) [I50 23]  Acute on chronic respiratory failure with hypoxia and hypercapnia ([facility]) [E67 57, J96 22]  Age/Sex: 79 y o  male  Admission Orders:  SCD  Cardio-Pulmonary Monitoring, Neuro Checks, Nursing dysphagia assesment, I/O, Daily weights, Vital signs    Scheduled Medications:  aspirin, 81 mg, Oral, Daily  azithromycin, 500 mg, Intravenous, Q24H  budesonide, 0 5 mg, Nebulization, Q12H  chlorhexidine, 15 mL, Mouth/Throat, Q12H GABE  cyanocobalamin, 1,000 mcg, Oral, Daily  enoxaparin, 40 mg, Subcutaneous, Daily  ergocalciferol, 50,000 Units, Oral, Q28 Days  formoterol, 20 mcg, Nebulization, Q12H  ipratropium, 0 5 mg, Nebulization, Q6H  levalbuterol, 1 25 mg, Nebulization, Q6H  melatonin, 3 mg, Oral, HS  methylPREDNISolone sodium succinate, 40 mg, Intravenous, Q8H GABE  pravastatin, 80 mg, Oral, Daily With Dinner  tamsulosin, 0 4 mg, Oral, Daily With Dinner  sodium chloride 0 9 % bolus 500 mL IV once    Continuous IV Infusions:  nitroGLYcerin (TRIDIL) 50 mg in 250 mL infusion (premix)  Rate: 1 5-60 mL/hr Dose: 5-200 mcg/min  Freq: Titrated Route: IV  Last Dose: Stopped (04/30/23 2219)  Start: 04/30/23 1830 End: 04/30/23 2216     PRN Meds:       IP CONSULT TO CASE MANAGEMENT  IP CONSULT TO PALLIATIVE CARE    Network Utilization Review Department  ATTENTION: Please call with any questions or concerns to [phone] and carefully listen to the prompts so that you are directed to the right person  All voicemails are confidential   Lillie Montano all requests for admission clinical reviews, approved or denied determinations and any other requests to dedicated fax number below belonging to the campus where the patient is receiving treatment   List of dedicated fax numbers for the Facilities:  [address] DENIALS (Administrative/Medical Necessity) [phone]   [address] (Maternity/NICU/Pediatrics) [phone]   [address] Nicole [address] [phone]   [facility]  [phone]   [address] [place] [address] [address] [address] [facility] 28 [phone]   [address] [facility] 134 815 [facility] [phone]

## 2023-05-01 NOTE — PLAN OF CARE
Problem: MOBILITY - ADULT  Goal: Maintain or return to baseline ADL function  Description: INTERVENTIONS:  -  Assess patient's ability to carry out ADLs; assess patient's baseline for ADL function and identify physical deficits which impact ability to perform ADLs (bathing, care of mouth/teeth, toileting, grooming, dressing, etc )  - Assess/evaluate cause of self-care deficits   - Assess range of motion  - Assess patient's mobility; develop plan if impaired  - Assess patient's need for assistive devices and provide as appropriate  - Encourage maximum independence but intervene and supervise when necessary  - Involve family in performance of ADLs  - Assess for home care needs following discharge   - Consider OT consult to assist with ADL evaluation and planning for discharge  - Provide patient education as appropriate  Outcome: Progressing  Goal: Maintains/Returns to pre admission functional level  Description: INTERVENTIONS:  - Perform BMAT or MOVE assessment daily    - Set and communicate daily mobility goal to care team and patient/family/caregiver  - Collaborate with rehabilitation services on mobility goals if consulted  - Perform Range of Motion 5 times a day  - Reposition patient every 2 hours    - Dangle patient  times a day  - Stand patient  times a day  - Ambulate patient  times a day  - Out of bed to chair  times a day   - Out of bed for meals times a day  - Out of bed for toileting  - Record patient progress and toleration of activity level   Outcome: Progressing     Problem: Prexisting or High Potential for Compromised Skin Integrity  Goal: Skin integrity is maintained or improved  Description: INTERVENTIONS:  - Identify patients at risk for skin breakdown  - Assess and monitor skin integrity  - Assess and monitor nutrition and hydration status  - Monitor labs   - Assess for incontinence   - Turn and reposition patient  - Assist with mobility/ambulation  - Relieve pressure over bony prominences  - Avoid friction and shearing  - Provide appropriate hygiene as needed including keeping skin clean and dry  - Evaluate need for skin moisturizer/barrier cream  - Collaborate with interdisciplinary team   - Patient/family teaching  - Consider wound care consult   Outcome: Progressing

## 2023-05-01 NOTE — ED PROCEDURE NOTE
PROCEDURE  CriticalCare Time    Date/Time: 4/30/2023 8:52 PM  Performed by: Judge Kacie DO  Authorized by:  Judge Kacie DO     Critical care provider statement:     Critical care time (minutes):  42    Critical care time was exclusive of:  Separately billable procedures and treating other patients and teaching time    Critical care was necessary to treat or prevent imminent or life-threatening deterioration of the following conditions:  Respiratory failure    Critical care was time spent personally by me on the following activities:  Blood draw for specimens, obtaining history from patient or surrogate, development of treatment plan with patient or surrogate, discussions with primary provider, evaluation of patient's response to treatment, examination of patient, review of old charts, re-evaluation of patient's condition, ordering and review of radiographic studies, ordering and review of laboratory studies, ordering and performing treatments and interventions and ventilator management    I assumed direction of critical care for this patient from another provider in my specialty: no           Judge Kacie DO  04/30/23 2053

## 2023-05-01 NOTE — CONSULTS
Consultation - Palliative and Supportive Care   Burak Gotti  79 y o  male [id number]    Patient Active Problem List   Diagnosis   • Nonobstructive atherosclerosis of coronary artery   • Nonischemic cardiomyopathy [facility])   • Chronic obstructive pulmonary disease (HCC)   • Left bundle-branch block   • KEVIN and COPD overlap syndrome (HCC)   • Gastroesophageal reflux disease   • Impaired fasting glucose   • Osteoarthritis   • Osteoporosis   • Vitamin D deficiency   • Mood disorder (HCC)   • Other insomnia   • Macrocytosis without anemia   • Chronic respiratory failure with hypoxia and hypercapnia ([facility])   • BPH with obstruction/lower urinary tract symptoms   • Prostate cancer ([address] )   • Pulmonary nodules/lesions, multiple   • Protein-calorie malnutrition (HCC)   • Chronic HFrEF (heart failure with reduced ejection fraction) ([facility])   • Anemia   • Physical deconditioning   • Hyperglycemia   • COPD exacerbation ([facility])     Active issues specifically addressed today include:  -Acute on chronic hypercapnic and hypoxic respiratory failure  -End-stage COPD  -Heart failure with reduced EF  -Goals of care  -Palliative care patient    Plan:  1  Symptom management -will defer to critical care for management  May benefit from a low-dose benzodiazepine for anxiety related to air hunger  2  Goals -evolving  Patient this time would like to remain a full code but does understand that this likely will not lead to an outcome where he will be able to return home if he would require intubation  Did discuss hospice services as well as this has been introduced in the past   Spouse is very overwhelmed but appreciative of support and is very understanding of his poor prognosis at this time  She agrees to ongoing palliative care follow-up for ongoing support during critical illness  3  Psychosocial support -time spent providing supportive listening    All questions and concerns were addressed to patient's and patient's spouse's satisfaction  Of note they have 2 children their daughter is 25 and their son is 16        4  PSC [id number] will continue to follow       Code Status: Full- Level 1   Decisional apparatus:  Patient is marginally competent on my exam today  If competence is lost, patient's substitute decision maker would default to spouse by [place]  Advance Directive / Living Will / POLST: None on file     I have reviewed the patient's controlled substance dispensing history in the Prescription Drug Monitoring Program in compliance with the [facility] regulations before prescribing any controlled substances  We appreciate the invitation to be involved in this patient's care  We will follow  Please do not hesitate to reach our on call provider through our clinic answering service at  should you have acute symptom control concerns  Anna Lyon DO  Palliative and Supportive Care  Clinic/Answering Service: [phone]  You can find me on [employer]! IDENTIFICATION:  Inpatient consult to Palliative Care  Consult performed by: Anna Lyon DO  Consult ordered by: Arlo Ganser, MD        Physician Requesting Consult: Jian Camejo MD  Reason for Consult / Principal Problem: Goals of care  Hx and PE limited by: Patient on BiPAP    HISTORY OF PRESENT ILLNESS:       Daniel Griffin  is a 79 y o  male who presented on April 30 for worsening shortness of breath  Patient is known to palliative care as he was seen in prior hospitalization earlier this year for similar event  Patient has very severe/end-stage COPD and heart failure with reduced EF with most recent echocardiogram showing an EF of 30%  Patient was found to be hypercapnic and lethargic and was placed on BiPAP  He was admitted to the medical ICU for further management    During hospitalization in February he required intubation and family meeting at that time had determined to optimize to extubation and make level 3 CODE STATUS  Hospice was also reviewed at that time  Palliative care was consulted upon this readmission for ongoing goals of care in setting of end-stage illness  Patient seen today with his spouse, Marnie Holley and daughter at bedside  They are familiar with palliative care services from prior hospitalizations  She really states prior to this hospitalization he had been doing quite well and was doing the exercises he had learned at rehab  They are understanding that he has very severe COPD and that there are limited interventions at this time that can be offered except for best supportive care  Patient would agree to short-term mechanical ventilation but did express concern that there would be a high likelihood he would not be able to be liberated from vent  At this time he does express anxiety as he feels like he cannot get a deep breath then  Also, he is hopeful for reprieve from the BiPAP to get a drink of water  Time spent with them providing supportive listening and answering all of their questions and concerns  We did offer hospice as an alternative and spouse and patient are both aware that this is an option for him when and if he is ready  We will continue to follow  Review of Systems   Unable to perform ROS: other   Limited due to BiPAP  The patient does complain of difficulty getting a deep breath then and anxiety      Past Medical History:   Diagnosis Date   • Acute metabolic encephalopathy 2/03/8010   • Arthritis    • Bladder mass    • Cardiomyopathy [facility])    • Chest pain    • COPD (chronic obstructive pulmonary disease) ([facility])    • COVID-19 virus infection 2/23/2023   • CPAP (continuous positive airway pressure) dependence    • Emphysema of lung (HCC)    • Hypoxia     nocturnal   • Left bundle branch block    • Multiple pulmonary nodules     last assessed: 10/12/16   • Pneumonia    • Sleep apnea    • Sleep apnea, obstructive    • Smoker    • Weight loss 8/29/2019     Past Surgical History: Procedure Laterality Date   • COLONOSCOPY     • CYSTOSCOPY     • CYSTOSCOPY  2021   • HI BLADDER INSTILLATION ANTICARCINOGENIC AGENT N/A 1/3/2020    Procedure: INSTILLATION MITOMYCIN;  Surgeon: Gladys Laurent MD;  [facility];  Service: Urology   • HI CYSTO W/REMOVAL OF LESIONS SMALL N/A 1/3/2020    Procedure: TRANSURETHRAL RESECTION OF BLADDER TUMOR (TURBT); Surgeon: Gladys Laurent MD;  [facility];  Service: Urology   • HI CYSTOURETHROSCOPY WITH BIOPSY N/A 2021    Procedure: Colette Dragon;  Surgeon: Gladys Laurent MD;  [facility];  Service: Urology   • HI TRURL ELECTROSURG [address] COMPLETE N/A 2021    Procedure: TRANSURETHRAL RESECTION OF PROSTATE (TURP) BLADDER BIOPSY, FULGURATION;  Surgeon: Gladys Laurent MD;  [facility];  Service: Urology     Social History     Socioeconomic History   • Marital status: /Civil Union     Spouse name: Not on file   • Number of children: Not on file   • Years of education: Not on file   • Highest education level: Not on file   Occupational History   • Not on file   Tobacco Use   • Smoking status: Former     Packs/day: 2 50     Years: 42 00     Pack years: 105 00     Types: Cigarettes     Start date:      Quit date:      Years since quittin 3   • Smokeless tobacco: Former   • Tobacco comments:     1 ppd for 37 years, 2010 down to 5 cigs a day, is around second hand smoke   Vaping Use   • Vaping Use: Never used   Substance and Sexual Activity   • Alcohol use: Not Currently     Comment: rarely   • Drug use: No   • Sexual activity: Not Currently     Partners: Female   Other Topics Concern   • Not on file   Social History Narrative    Daily coffee consumption: 8 or more cups a day        Used to work in [employer]  On disability       Social Determinants of Health     Financial Resource Strain: Not on file   Food Insecurity: No Food Insecurity   • Worried About Running Out of Food in the Last Year: Never true   • Ran Out of Food in the Last Year: Never true   Transportation Needs: No Transportation Needs   • Lack of Transportation (Medical): No   • Lack of Transportation (Non-Medical): No   Physical Activity: Not on file   Stress: Not on file   Social Connections: Not on file   Intimate Partner Violence: Not on file   Housing Stability: Unknown   • Unable to Pay for Housing in the Last Year: Not on file   • Number of Places Lived in the Last Year: Not on file   • Unstable Housing in the Last Year: No     Family History   Problem Relation Age of Onset   • Diabetes Mother        MEDICATIONS / ALLERGIES:    all current active meds have been reviewed and current meds:   Current Facility-Administered Medications   Medication Dose Route Frequency   • azithromycin (ZITHROMAX) 500 mg in sodium chloride 0 9 % 250 mL IVPB  500 mg Intravenous Q24H   • budesonide (PULMICORT) inhalation solution 0 5 mg  0 5 mg Nebulization Q12H   • chlorhexidine (PERIDEX) 0 12 % oral rinse 15 mL  15 mL Mouth/Throat Q12H [address]   • enoxaparin (LOVENOX) subcutaneous injection 40 mg  40 mg Subcutaneous Daily   • formoterol (PERFOROMIST) nebulizer solution 20 mcg  20 mcg Nebulization Q12H   • ipratropium (ATROVENT) 0 02 % inhalation solution 0 5 mg  0 5 mg Nebulization Q6H   • levalbuterol (XOPENEX) inhalation solution 1 25 mg  1 25 mg Nebulization Q6H   • methylPREDNISolone sodium succinate (Solu-MEDROL) injection 40 mg  40 mg Intravenous Q8H [address]       No Known Allergies    OBJECTIVE:    Physical Exam  Physical Exam  Vitals and nursing note reviewed  Constitutional:       General: He is not in acute distress  Appearance: He is ill-appearing  HENT:      Head: Normocephalic and atraumatic  Right Ear: External ear normal       Left Ear: External ear normal       Nose: Nose normal    Eyes:      General: No scleral icterus  Right eye: No discharge  Left eye: No discharge     Cardiovascular: " Rate and Rhythm: Normal rate and regular rhythm  Pulmonary:      Effort: Pulmonary effort is normal  No respiratory distress  Breath sounds: Wheezing present  Comments: Barrel chest  Abdominal:      General: Bowel sounds are normal  There is no distension  Palpations: Abdomen is soft  Musculoskeletal:      Comments: Clubbing of fingers   Skin:     General: Skin is warm and dry  Coloration: Skin is pale  Neurological:      Mental Status: He is alert and oriented to person, place, and time  Lab Results:   I have personally reviewed pertinent labs  , CBC:   Lab Results   Component Value Date    WBC 2 94 (L) 05/01/2023    HGB 11 3 (L) 05/01/2023    HCT 38 4 05/01/2023     (H) 05/01/2023     05/01/2023    MCH 30 7 05/01/2023    MCHC 29 4 (L) 05/01/2023    RDW 12 1 05/01/2023    MPV 9 2 05/01/2023    NRBC 0 05/01/2023   , CMP:   Lab Results   Component Value Date    SODIUM 132 (L) 05/01/2023    K 4 6 05/01/2023    CL 87 (L) 05/01/2023    CO2 >45 (HH) 05/01/2023    BUN 34 (H) 05/01/2023    CREATININE 0 79 05/01/2023    CALCIUM 9 4 05/01/2023    AST 35 04/30/2023    ALT 23 04/30/2023    ALKPHOS 68 04/30/2023    EGFR 93 05/01/2023   , PT/PTT:No results found for: PT, PTT  Imaging Studies: Reviewed on PACS  EKG, Pathology, and Other Studies: Reviewed    Counseling / Coordination of Care    Total floor / unit time spent today 55+ minutes  Greater than 50% of total time was spent with the patient and / or family counseling and / or coordination of care  A description of the counseling / coordination of care: Chart review, medication review, discussion with bedside RN, discussion with critical care team, goals of care, supportive listening  Portions of this document may have been created using dictation software and as such some \"sound alike\" terms may have been generated by the system  Do not hesitate to contact me with any questions or clarifications      "

## 2023-05-01 NOTE — QUICK NOTE
Patient Name: Raúl Kwon  Patient MRN: [id number]   Date: 05/01/23   Time: 12:18 PM   Unit/Bed: MICU 13    Family/Relationship: Lory Sewell (Significant other)    Location: Bedside    Content of meeting: updated patient's family at bedside about current treatment plan  Patient and his wife has decided to upgrade his code status from level III to level I  Family does not want to pursue hospice or comfort level care at this time as patient is able to perform his ADLs independently at home       Family understanding of patient's condition: Satisfactory    Comments: None    Time spent: 10 minutes    [email or web address]

## 2023-05-01 NOTE — RESPIRATORY THERAPY NOTE
RT Protocol Note  Juma Nichols  79 y o  male MRN: [id number]  Unit/Bed#: MICU 13 Encounter: [id number]    Assessment    Active Problems:    * No active hospital problems  *      Home Pulmonary Medications:  Albuteral PRN        Past Medical History:   Diagnosis Date    Acute metabolic encephalopathy 8088    Arthritis     Bladder mass     Cardiomyopathy (HCC)     Chest pain     COPD (chronic obstructive pulmonary disease) (HCC)     COVID-19 virus infection 2023    CPAP (continuous positive airway pressure) dependence     Emphysema of lung (HCC)     Hypoxia     nocturnal    Left bundle branch block     Multiple pulmonary nodules     last assessed: 10/12/16    Pneumonia     Sleep apnea     Sleep apnea, obstructive     Smoker     Weight loss 2019     Social History     Socioeconomic History    Marital status: /Civil Union     Spouse name: None    Number of children: None    Years of education: None    Highest education level: None   Occupational History    None   Tobacco Use    Smoking status: Former     Packs/day: 2 50     Years: 42 00     Pack years: 105 00     Types: Cigarettes     Start date:      Quit date:      Years since quittin 3    Smokeless tobacco: Former    Tobacco comments:     1 ppd for 37 years, 2010 down to 5 cigs a day, is around second hand smoke   Vaping Use    Vaping Use: Never used   Substance and Sexual Activity    Alcohol use: Not Currently     Comment: rarely    Drug use: No    Sexual activity: Not Currently     Partners: Female   Other Topics Concern    None   Social History Narrative    Daily coffee consumption: 8 or more cups a day        Used to work in [employer]  On disability       Social Determinants of Health     Financial Resource Strain: Not on file   Food Insecurity: No Food Insecurity    Worried About [address] [employer] in the Last Year: Never true    Ran Out of Food in the Last Year: Never true   Transportation Needs: No Transportation Needs    Lack of Transportation (Medical): No    Lack of Transportation (Non-Medical): No   Physical Activity: Not on file   Stress: Not on file   Social Connections: Not on file   Intimate Partner Violence: Not on file   Housing Stability: Unknown    Unable to Pay for Housing in the Last Year: Not on file    Number of Places Lived in the Last Year: Not on file    Unstable Housing in the Last Year: No       Subjective         Objective    Physical Exam:   Assessment Type: (P) Assess only  General Appearance: (P) Lethargic  Respiratory Pattern: (P) Labored, Dyspnea at rest  Chest Assessment: (P) Chest expansion symmetrical  Bilateral Breath Sounds: (P) Diminished, Expiratory wheezes  Cough: (P) None  O2 Device: bipap    Vitals:  Blood pressure 106/67, pulse 94, temperature 99 5 °F (37 5 °C), temperature source Rectal, resp  rate (!) 35, weight 48 2 kg (106 lb 4 2 oz), SpO2 97 %  Imaging and other studies: I have personally reviewed pertinent reports  O2 Device: bipap     Plan    Respiratory Plan: (P) Vent/NIV/HFNC        Resp Comments: (P) Pt has hx of COPD, Pt placed on Bipap  Pt stable and tolerating well

## 2023-05-01 NOTE — PROGRESS NOTES
Daily Progress Note - Critical Care   Beka Holley  79 y o  male MRN: [id number]  Unit/Bed#: MICU 13 Encounter: [id number]        ----------------------------------------------------------------------------------------  HPI/24hr events: Beka Holley  is a 79 y o  with PMH of COPD and HF with EF 30% who presented om 4/30 with increasing SOB x2 days with decrease PO intake  Pt follows closely with pulm and was seen by them in the office on Friday  Pt was told to increase his steroids and begin on azithromycin  Pt has been taking the azithro but has only been continuing his normal 5mg steroid dose daily  Pt was reportedly in the hospital for a similar event in February of this kim      Pt was treated as COPD exacerbation in the ED with solumedrol and duo-nebs  He was also started on nitro gtt due to concern for HF exacerbation with elevated BNP (17,000) and vascular congestion on CXR  Pt was placed on BiPAP due to hypoxia and inevitable admitted to critical care for further management  Troponin and procal were negative, CMP significant for hyponatremia (129), strep pneumonia and legionella negative, UA with WBC and bacteria, vial panel + for Rhino/enterovirus      ---------------------------------------------------------------------------------------  SUBJECTIVE  Patient on BiPAP  Review of Systems  Review of systems was unable to be performed secondary to patient on BiPAP  ---------------------------------------------------------------------------------------  Assessment and Plan:  1  Acute hypoxic respiratory failure  2  COPD with acute exacerbation  3  Acute on chronic systolic heart failure with an EF of 30%  4  Hyponatremia with hypochloremia  5  Metabolic alkalosis  6  Encephalopathy-hypoxia and metabolic etiology  7  KEVIN-CPAP at bedtime  8  BPH    Neuro:   • Diagnosis: Acute encephalopathy  ?  Plan:   - Suspected to be respiratory in origin  - CO2 increasing on repeat VBGs  - Increased BiPAP setting  - Serial blood gases  - Consider intubation if not improving or unable to protect airway  • Diagnosis: Hx of mood disorder  ? Plan: Continue home Lexapro when able to tolerate PO        CV:   • Diagnosis: Heart failure with acute exacerbation  ? Plan:  - BNP significantly elevated upon arrival  - Most recent echo with EF 30%  - Repeat echo while inpt  - Diuresis PRN  - Wean nitro gtt  • Diagnosis: Hx of HTN  ? Plan: Hold anti-hypertensives  • Diagnosis: Hx of HLD  ? Plan: Continue home statin when able to tolerate PO        Pulm:  • Diagnosis: COPD with acute exacerbation  • GOLD D  • PFTs 2020: FEV1/FVC: 30%, FVC: 2L, 59% predicted, FEV1: 0 6L 22% predicted, %, %, DLCO 45%  ? Plan:   - Continue Xopenex/Atrovent nebs q6  - Continue home pulmicort and formeterol  - Solumedrol q8  - Continue BiPAP  - Continue azithromycin (started outpt 4/28)  • Diagnosis: Acute on chronic respiratory failure  ? Plan:   - Chronically on 3L NC at home  - Likely multifactorial including COPD and HF  - RP2 panel ordered to evaluate for viral illness as trigger  - Continue BiPAP  - Wean supplemental oxygen as tolerated  - Goal sat >88%  - Palliative consult placed  - Goals of care discussion with family as recurrent issue        GI:   No active issues     :   No active issues     F/E/N:    • Fluids  ?  Plan: No maintenance  • Electrolytes:  ? Plan:   - Trend and replete as indicated  - Hyponatremia  - q8 BMP  - Avoid fast correction  - Urine osms and Na pending  • Nutrition:  ? Plan: NPO while on BiPAP        Heme/Onc:   No active issues     Endo:   No active issues     ID:   No active issues     MSK/Skin:   No active issues      Patient appropriate for transfer out of the ICU today?: No  Disposition: Continue Critical Care   Code Status: Level 1 - Full Code  ---------------------------------------------------------------------------------------  ICU CORE MEASURES    Prophylaxis   VTE Pharmacologic Prophylaxis: Enoxaparin (Lovenox)  VTE Mechanical Prophylaxis: sequential compression device  Stress Ulcer Prophylaxis: Prophylaxis Not Indicated     Invasive Devices Review  Invasive Devices     Peripheral Intravenous Line  Duration           Peripheral IV 23 Distal;Left;Upper;Ventral (anterior) Arm <1 day    Peripheral IV 23 Right Antecubital <1 day              Can any invasive devices be discontinued today? No  ---------------------------------------------------------------------------------------  OBJECTIVE    Vitals   Vitals:    23 0403 23 0500 23 0551 23 0600   BP:  93/58  93/59   BP Location:       Pulse:  88  90   Resp:  (!) 26  (!) 26   Temp:       TempSrc:       SpO2: 98% 99%  99%   Weight:   47 7 kg (105 lb 2 6 oz)    Height:         Temp (24hrs), Av 4 °F (36 9 °C), Min:96 2 °F (35 7 °C), Max:99 5 °F (37 5 °C)  Current: Temperature: 98 8 °F (37 1 °C)  HR: 80-98  BP: /70  RR: 14  SpO2: 95    Respiratory:  SpO2: SpO2: 99 %, SpO2 Activity: SpO2 Activity: At Rest, SpO2 Device: O2 Device: BiPAP       Invasive/non-invasive ventilation settings   Respiratory    Lab Data (Last 4 hours)    None         O2/Vent Data (Last 4 hours)      403          Non-Invasive Ventilation Mode BiPAP                   Physical Exam  Constitutional:       Comments: Chronically ill appearing, somnolent, thin appearing   HENT:      Head: Normocephalic and atraumatic  Mouth/Throat:      Comments: BiPAP in place  Eyes:      Extraocular Movements: Extraocular movements intact  Conjunctiva/sclera: Conjunctivae normal    Cardiovascular:      Rate and Rhythm: Regular rhythm  Tachycardia present  Pulmonary:      Comments: BiPAP  Abdominal:      Palpations: Abdomen is soft  Musculoskeletal:      Right lower leg: Edema present  Left lower leg: Edema present  Comments: 1+ BLE edema   Skin:     General: Skin is warm and dry        Capillary Refill: Capillary refill takes less than 2 seconds  Laboratory and Diagnostics:  Results from last 7 days   Lab Units 05/01/23  0424 04/30/23  1804   WBC Thousand/uL 2 94* 7 16   HEMOGLOBIN g/dL 11 3* 12 1   HEMATOCRIT % 38 4 39 5   PLATELETS Thousands/uL 223 269   NEUTROS PCT % 89* 69   MONOS PCT % 1* 14*     Results from last 7 days   Lab Units 05/01/23  0430 04/30/23  2241 04/30/23  1804   SODIUM mmol/L 132* 132* 129*   POTASSIUM mmol/L 4 6 4 3 4 7   CHLORIDE mmol/L 87* 86* 86*   CO2 mmol/L >45* >45* 42*   ANION GAP mmol/L  --   --  1*   BUN mg/dL 34* 37* 37*   CREATININE mg/dL 0 79 0 72 0 91   CALCIUM mg/dL 9 4 9 5 9 7   GLUCOSE RANDOM mg/dL 145* 122 152*   ALT U/L  --   --  23   AST U/L  --   --  35   ALK PHOS U/L  --   --  68   ALBUMIN g/dL  --   --  3 3*   TOTAL BILIRUBIN mg/dL  --   --  0 44                       ABG:  Results from last 7 days   Lab Units 04/30/23  2340   PH ART  7 281*   PCO2 ART mm Hg 95 9*   PO2 ART mm Hg 87 0   HCO3 ART mmol/L 44 2*   BASE EXC ART mmol/L 13 6   ABG SOURCE  Radial, Left     VBG:  Results from last 7 days   Lab Units 05/01/23  0550 05/01/23  0424 04/30/23  2340   PH JHONATHAN  7 285*   < >  --    PCO2 JHONATHAN mm Hg 118 2*   < >  --    PO2 JHONATHAN mm Hg 27 4*   < >  --    HCO3 JHONATHAN mmol/L 54 9*   < >  --    BASE EXC JHONATHAN mmol/L 22 8   < >  --    ABG SOURCE   --   --  Radial, Left    < > = values in this interval not displayed  Results from last 7 days   Lab Units 04/30/23  1804   PROCALCITONIN ng/ml 0 10       Micro  Results from last 7 days   Lab Units 04/30/23  2233   LEGIONELLA URINARY ANTIGEN  Negative   STREP PNEUMONIAE ANTIGEN, URINE  Negative       EKG: reviewed  Imaging:  I have personally reviewed pertinent reports     and I have personally reviewed pertinent films in PACS    Intake and Output  I/O       04/29 0701 04/30 0700 04/30 0701  05/01 0700    I V  (mL/kg)  77 7 (1 6)    IV Piggyback  800    Total Intake(mL/kg)  877 7 (18 4)    Urine (mL/kg/hr)  90    Total Output  90    Net  +787 7          Unmeasured "Urine Occurrence  2 x        UOP: 90 ml/hr     Height and Weights   Height: 5' (152 4 cm)  IBW (Ideal Body Weight): 50 kg  Body mass index is 20 54 kg/m²  Weight (last 2 days)     Date/Time Weight    05/01/23 0551 47 7 (105 16)    04/30/23 2031 48 2 (106 26)            Nutrition       Diet Orders   (From admission, onward)             Start     Ordered    04/30/23 2029  Diet NPO  Diet effective now        References:    Nutrtion Support Algorithm Enteral vs  Parenteral   Question Answer Comment   Diet Type NPO    RD to adjust diet per protocol? Yes        04/30/23 2030              Active Medications  Scheduled Meds:  Current Facility-Administered Medications   Medication Dose Route Frequency Provider Last Rate   • azithromycin  500 mg Intravenous Q24H Marleen Crews MD Stopped (04/30/23 0596)   • budesonide  0 5 mg Nebulization Q12H Marleen Crews MD     • chlorhexidine  15 mL Mouth/Throat Q12H [address] Marleen Crews MD     • enoxaparin  40 mg Subcutaneous Daily Marleen Crews MD     • formoterol  20 mcg Nebulization Q12H Marleen Crews MD     • ipratropium  0 5 mg Nebulization Q6H Ivin Art, DO     • levalbuterol  1 25 mg Nebulization Q6H Ivin Art, DO     • methylPREDNISolone sodium succinate  40 mg Intravenous [facility] Marleen Crews MD       Continuous Infusions:     PRN Meds:        Allergies   No Known Allergies  ---------------------------------------------------------------------------------------  Advance Directive and Living Will:      Power of :    POLST:    ---------------------------------------------------------------------------------------  Care Time Delivered:   No Critical Care time spent     [facility], DO      Portions of the record may have been created with voice recognition software  Occasional wrong word or \"sound a like\" substitutions may have occurred due to the inherent limitations of voice recognition software    Read the chart " carefully and recognize, using context, where substitutions have occurred

## 2023-05-01 NOTE — H&P
[address] [place] [address]  H&P: Critical Care  Name: Saqib Shah  79 y o  male I MRN: [id number]  Unit/Bed#: MICU 15 I Date of Admission: 4/30/2023   Date of Service: 4/30/2023 I Hospital Day: 0      Assessment/Plan   Neuro:   · Diagnosis: Acute metabolic encephalopathy  · Plan:   · Suspected to be respiratory in origin  · CO2 increasing on repeat VBGs  · Increased BiPAP setting  · Serial blood gases  · Consider intubation if not improving or unable to protect airway  · Diagnosis: Hx of mood disorder  · Plan: Continue home Lexapro when able to tolerate PO      CV:   · Diagnosis: Heart failure with acute exacerbation  · Plan:  · BNP significantly elevated upon arrival  · Most recent echo with EF 30%  · Repeat echo while inpt  · Diuresis PRN  · Wean nitro gtt  · Diagnosis: Hx of HTN  · Plan: Hold anti-hypertensives  · Diagnosis: Hx of HLD  · Plan: Continue home statin when able to tolerate PO      Pulm:  · Diagnosis: COPD with acute exacerbation  · Plan:   · Continue Xopenex/Atrovent nebs q6  · Continue home pulmicort and formeterol  · Solumedrol q8  · Continue BiPAP  · Continue azithromycin (started outpt 4/28)  · Diagnosis: Acute on chronic respiratory failure  · Plan:   · Chronically on 3L NC at home  · Likely multifactorial including COPD and HF  · RP2 panel ordered to evaluate for viral illness as trigger  · Continue BiPAP  · Wean supplemental oxygen as tolerated  · Goal sat >88%  · Palliative consult placed  · Goals of care discussion with family as recurrent issue      GI:   No active issues    :   No active issues    F/E/N:    · Fluids  · Plan: No maintenance  · Electrolytes:  · Plan:   · Trend and replete as indicated  · Hyponatremia  · q8 BMP  · Avoid fast correction  · Urine osms and Na pending  · Nutrition:  · Plan: NPO while on BiPAP      Heme/Onc:   No active issues    Endo:   No active issues    ID:   No active issues    MSK/Skin:   No active issues    Disposition: Critical care       History of Present Illness     HPI: Saqib Shah  is a 79 y o  with PMH of COPD and HF with EF 30% who presents with SOB  Patient unable to provide history and history therefore provided by wife  She reports that pt has been having some increasing SOB particularly over the past two days  Pt follows closely with pulm and was seen by them in the office on Friday  Pt was told to increase his steroids and begin on azithromycin  Pt has been taking the azithro but has only been continuing his normal 5mg steroid dose daily  Pt has not had any recent illness including fevers  He has been eating and drinking normally as well as watching his sodium intake  Wife reports that earlier today he was alert and oriented and as the day went on he became increasingly short of breath and less responsive  Pt was reportedly in the hospital for a similar event in February of this kim  Pt was treated as COPD exacerbation in the ED with solumedrol and duo-nebs  He was also started on nitro gtt due to concern for HF exacerbation with elevated BNP and vascular congestion on CXR  Pt was placed on BiPAP due to hypoxia and inevitable admitted to critical care for further management  History obtained from spouse, chart review and unobtainable from patient due to mental status  Review of Systems   Respiratory: Positive for cough and shortness of breath  Unable to obtain remainder of ROS due to pt mental status       Historical Information   Past Medical History:  5/76/2369: Acute metabolic encephalopathy  No date: Arthritis  No date: Bladder mass  No date: Cardiomyopathy [facility])  No date: Chest pain  No date: COPD (chronic obstructive pulmonary disease) ([facility] [address] )  2/23/2023: COVID-19 virus infection  No date: CPAP (continuous positive airway pressure) dependence  No date: Emphysema of lung (HCC)  No date: Hypoxia      Comment:  nocturnal  No date: Left bundle branch block  No date: Multiple pulmonary nodules      Comment:  last assessed: 10/12/16  No date: Pneumonia  No date: Sleep apnea  No date: Sleep apnea, obstructive  No date: Smoker  8/29/2019: Weight loss Past Surgical History:  No date: COLONOSCOPY  No date: CYSTOSCOPY  02/17/2021: CYSTOSCOPY  1/3/2020: OR BLADDER INSTILLATION ANTICARCINOGENIC AGENT; N/A      Comment:  Procedure: INSTILLATION MITOMYCIN;  Surgeon: Elida Desai MD;  Location: [facility];  Service: Urology  1/3/2020: OR CYSTO W/REMOVAL OF LESIONS SMALL; N/A      Comment:  Procedure: TRANSURETHRAL RESECTION OF BLADDER TUMOR                (TURBT); Surgeon: Felecia Mcdonough MD;  Location: [facility]                MAIN OR;  Service: Urology  4/13/2021: OR CYSTOURETHROSCOPY WITH BIOPSY; N/A      Comment:  Procedure: CYSTOSCOPY WITH BIOPSIES;  Surgeon: Elida Desai MD;  [facility];  Service: Urology  4/13/2021: Mildredbarrington Minormann ELECTROSURG RESCJ PROSTATE BLEED COMPLETE; N/A      Comment:  Procedure: TRANSURETHRAL RESECTION OF PROSTATE (TURP)                BLADDER BIOPSY, FULGURATION;  Surgeon: Felecia Mcdonough MD;  Location: [facility];  Service: Urology   Current Outpatient Medications   Medication Instructions   • acetaminophen (TYLENOL) 975 mg, Oral, Every 8 hours PRN   • albuterol (PROVENTIL HFA,VENTOLIN HFA) 90 mcg/act inhaler 2 puffs, Inhalation, Every 6 hours PRN   • aspirin 81 mg, Oral, Daily   • azithromycin (ZITHROMAX) 250 mg tablet Take 2 tablets today then 1 tablet daily x 4 days   • budesonide (PULMICORT) 0 5 mg, Nebulization, 2 times daily, Rinse mouth after use     • cyanocobalamin (VITAMIN B-12) 1,000 mcg, Oral, Daily   • Diclofenac Sodium (VOLTAREN) 1 % APPLY 2 GRAMS 4 TIMES A DAY   • docusate sodium (COLACE) 100 mg, Oral, 2 times daily   • ergocalciferol (VITAMIN D2) 50,000 Units, Oral, Every 28 days   • escitalopram (LEXAPRO) 5 mg, Oral, Daily   • ferrous sulfate 325 mg, Oral, Every other day   • formoterol (PERFOROMIST) 20 MCG/2ML nebulizer solution TAKE 2 ML (20 MCG TOTAL) BY NEBULIZATION 2 (TWO) TIMES A DAY   • guaiFENesin (MUCINEX) 1,200 mg, Oral, Every 12 hours scheduled   • ipratropium (ATROVENT) 0 5 mg, Nebulization, 3 times daily (RESP)   • ipratropium-albuterol (DUO-NEB) 0 5-2 5 mg/3 mL nebulizer solution 3 mL, Nebulization, 3 times daily   • levalbuterol (XOPENEX) 1 25 mg, Nebulization, 3 times daily (RESP)   • losartan (COZAAR) 12 5 mg, Oral, Daily   • Melatonin 5 MG TABS 1 tablet, Daily at bedtime   • Melatonin 5 mg, Oral   • predniSONE 5 mg, Oral, Daily   • rosuvastatin (CRESTOR) 10 mg, Oral, Daily   • senna-docusate sodium (SENOKOT S) 8 6-50 mg per tablet 1 tablet, Oral, Daily at bedtime   • tamsulosin (FLOMAX) 0 4 mg, Oral, Daily with dinner   • torsemide (DEMADEX) 20 mg, Oral, Daily    No Known Allergies   Social History     Tobacco Use   • Smoking status: Former     Packs/day: 2 50     Years: 42 00     Pack years: 105 00     Types: Cigarettes     Start date: 5     Quit date:      Years since quittin 3   • Smokeless tobacco: Former   • Tobacco comments:     1 ppd for 37 years, 2010 down to 5 cigs a day, is around second hand smoke   Vaping Use   • Vaping Use: Never used   Substance Use Topics   • Alcohol use: Not Currently     Comment: rarely   • Drug use: No    Family History   Problem Relation Age of Onset   • Diabetes Mother           Objective                            Vitals I/O      Most Recent Min/Max in 24hrs   Temp (!) 96 2 °F (35 7 °C) Temp  Min: 96 2 °F (35 7 °C)  Max: 96 2 °F (35 7 °C)   Pulse 88 Pulse  Min: 83  Max: 103   Resp (!) 35 Resp  Min: 24  Max: 41   /62 BP  Min: 89/59  Max: 144/84   O2 Sat 97 % SpO2  Min: 96 %  Max: 99 %    No intake or output data in the 24 hours ending 233      Diet NPO     Invasive Monitoring Physical exam    Physical Exam  Vitals reviewed  Constitutional:       Appearance: He is ill-appearing  HENT:      Head: Normocephalic and atraumatic        Right Ear: External ear normal       Left Ear: External ear normal       Nose: Nose normal       Mouth/Throat:      Comments: BiPAP in place  Eyes:      Pupils: Pupils are equal, round, and reactive to light  Comments: Unable to assess EOM   Cardiovascular:      Rate and Rhythm: Regular rhythm  Tachycardia present  Heart sounds: Normal heart sounds  Pulmonary:      Effort: Tachypnea present  Breath sounds: Decreased air movement (diffusely) present  Rhonchi (diffusely) present  Abdominal:      General: There is no distension  Palpations: Abdomen is soft  Tenderness: There is no abdominal tenderness  Musculoskeletal:         General: Normal range of motion  Cervical back: Neck supple  Right lower leg: Edema (2+ pitting; symmetric) present  Left lower leg: Edema (2+ pitting; symmetric) present  Skin:     General: Skin is warm and dry  Capillary Refill: Capillary refill takes less than 2 seconds  Neurological:      Comments: Not opening eyes, not following commands, not responding to noxious stimuli   Psychiatric:      Comments: Unable to assess              Diagnostic Studies      EKG: Reviewed   Imaging:  I have personally reviewed pertinent reports         Medications:  Scheduled PRN   azithromycin, 500 mg, Q24H  budesonide, 0 5 mg, Q12H  chlorhexidine, 15 mL, Q12H [facility]  [START ON 5/1/2023] enoxaparin, 40 mg, Daily  formoterol, 20 mcg, Q12H  ipratropium, 0 5 mg, Q6H  levalbuterol, 1 25 mg, Q6H  [START ON 5/1/2023] methylPREDNISolone sodium succinate, 40 mg, Q8H GABE          Continuous    nitroGLYcerin, 5-200 mcg/min, Last Rate: 18 333 mcg/min (04/30/23 1919)         Labs:    CBC    Recent Labs     04/30/23  1804   WBC 7 16   HGB 12 1   HCT 39 5        BMP    Recent Labs     04/30/23  1804   SODIUM 129*   K 4 7   CL 86*   CO2 42*   AGAP 1*   BUN 37*   CREATININE 0 91   CALCIUM 9 7       Coags    No recent results     Additional Electrolytes  No recent results       Blood Gas    No recent results  Recent Labs     04/30/23 2006   PHVEN 7 320   KFR3ZMS 90 9*   PO2VEN 47 4*   UJL1RPM 45 8*   BEVEN 15 8    LFTs  Recent Labs     04/30/23  1804   ALT 23   AST 35   ALKPHOS 68   ALB 3 3*   TBILI 0 44       Infectious  Recent Labs     04/30/23  1804   PROCALCITONI 0 10     Glucose  Recent Labs     04/30/23  1804   GLUC 152*             Bonnie Velez MD

## 2023-05-01 NOTE — UTILIZATION REVIEW
NOTIFICATION OF INPATIENT ADMISSION   AUTHORIZATION REQUEST   SERVICING FACILITY:   [facility]  Address: [address] [place] [address], [address] [place] [address]  Tax ID: [id number]  NPI: [id number] ATTENDING PROVIDER:  Attending Name and NPI#: Layton Mckeon Md [[id number]]  Address: [address] [place] [address]  Phone: [phone]   ADMISSION INFORMATION:  [facility] of [facility]patient [address] [facility]  [address]  Place of Service Code: 21  Inpatient Admission Date/Time: 4/30/23  8:30 PM  Discharge Date/Time: No discharge date for patient encounter  Admitting Diagnosis Code/Description:  Shortness of breath [R06 02]  COPD exacerbation (HCC) [J44 1]  Acute on chronic systolic CHF (congestive heart failure) ([facility] [address] ) [I50 23]  Acute on chronic respiratory failure with hypoxia and hypercapnia ([facility] [address] ) [R52 12, J96 22]     UTILIZATION REVIEW CONTACT:  Danika Fay Utilization   Network Utilization Review Department  Phone: [phone]  Fax: [phone]  Email: [email or web address]  Contact for approvals/pending authorizations, clinical reviews, and discharge  PHYSICIAN ADVISORY SERVICES:  Medical Necessity Denial & [facility]rq[facility] Review  Phone: [phone]  Fax: [phone]  Email: [email or web address]

## 2023-05-02 PROBLEM — E78.5 HLD (HYPERLIPIDEMIA): Status: ACTIVE | Noted: 2023-05-02

## 2023-05-02 LAB
ALBUMIN SERPL BCP-MCNC: 2.8 G/DL (ref 3.5–5)
ALP SERPL-CCNC: 51 U/L (ref 46–116)
ALT SERPL W P-5'-P-CCNC: 20 U/L (ref 12–78)
AST SERPL W P-5'-P-CCNC: 25 U/L (ref 5–45)
BASE EX.OXY STD BLDV CALC-SCNC: 85.4 % (ref 60–80)
BASE EXCESS BLDV CALC-SCNC: 13.8 MMOL/L
BASOPHILS # BLD AUTO: 0 THOUSANDS/ÂΜL (ref 0–0.1)
BASOPHILS NFR BLD AUTO: 0 % (ref 0–1)
BILIRUB SERPL-MCNC: 0.49 MG/DL (ref 0.2–1)
BUN SERPL-MCNC: 33 MG/DL (ref 5–25)
CALCIUM ALBUM COR SERPL-MCNC: 10 MG/DL (ref 8.3–10.1)
CALCIUM SERPL-MCNC: 9 MG/DL (ref 8.3–10.1)
CHLORIDE SERPL-SCNC: 86 MMOL/L (ref 96–108)
CO2 SERPL-SCNC: >45 MMOL/L (ref 21–32)
CREAT SERPL-MCNC: 0.67 MG/DL (ref 0.6–1.3)
EOSINOPHIL # BLD AUTO: 0 THOUSAND/ÂΜL (ref 0–0.61)
EOSINOPHIL NFR BLD AUTO: 0 % (ref 0–6)
ERYTHROCYTE [DISTWIDTH] IN BLOOD BY AUTOMATED COUNT: 12.1 % (ref 11.6–15.1)
GFR SERPL CREATININE-BSD FRML MDRD: 100 ML/MIN/1.73SQ M
GLUCOSE SERPL-MCNC: 155 MG/DL (ref 65–140)
HCO3 BLDV-SCNC: 40.3 MMOL/L (ref 24–30)
HCT VFR BLD AUTO: 31 % (ref 36.5–49.3)
HGB BLD-MCNC: 9.9 G/DL (ref 12–17)
IMM GRANULOCYTES # BLD AUTO: 0.01 THOUSAND/UL (ref 0–0.2)
IMM GRANULOCYTES NFR BLD AUTO: 0 % (ref 0–2)
IPAP: 18
LYMPHOCYTES # BLD AUTO: 0.31 THOUSANDS/ÂΜL (ref 0.6–4.47)
LYMPHOCYTES NFR BLD AUTO: 8 % (ref 14–44)
MCH RBC QN AUTO: 31.6 PG (ref 26.8–34.3)
MCHC RBC AUTO-ENTMCNC: 31.9 G/DL (ref 31.4–37.4)
MCV RBC AUTO: 99 FL (ref 82–98)
MONOCYTES # BLD AUTO: 0.23 THOUSAND/ÂΜL (ref 0.17–1.22)
MONOCYTES NFR BLD AUTO: 6 % (ref 4–12)
NEUTROPHILS # BLD AUTO: 3.58 THOUSANDS/ÂΜL (ref 1.85–7.62)
NEUTS SEG NFR BLD AUTO: 86 % (ref 43–75)
NON VENT- BIPAP: ABNORMAL
NRBC BLD AUTO-RTO: 0 /100 WBCS
O2 CT BLDV-SCNC: 13.2 ML/DL
PCO2 BLDV: 61.7 MM HG (ref 42–50)
PEEP MAX SETTING VENT: 5 CM[H2O]
PH BLDV: 7.43 [PH] (ref 7.3–7.4)
PLATELET # BLD AUTO: 231 THOUSANDS/UL (ref 149–390)
PMV BLD AUTO: 9.4 FL (ref 8.9–12.7)
PO2 BLDV: 52 MM HG (ref 35–45)
POTASSIUM SERPL-SCNC: 4.3 MMOL/L (ref 3.5–5.3)
PROCALCITONIN SERPL-MCNC: 0.05 NG/ML
PROT SERPL-MCNC: 5.5 G/DL (ref 6.4–8.4)
RBC # BLD AUTO: 3.13 MILLION/UL (ref 3.88–5.62)
SODIUM SERPL-SCNC: 130 MMOL/L (ref 135–147)
VENT BIPAP FIO2: 40 %
WBC # BLD AUTO: 4.13 THOUSAND/UL (ref 4.31–10.16)

## 2023-05-02 RX ORDER — METHYLPREDNISOLONE SODIUM SUCCINATE 40 MG/ML
40 INJECTION, POWDER, LYOPHILIZED, FOR SOLUTION INTRAMUSCULAR; INTRAVENOUS 2 TIMES DAILY
Status: DISCONTINUED | OUTPATIENT
Start: 2023-05-02 | End: 2023-05-03

## 2023-05-02 RX ORDER — AZITHROMYCIN 250 MG/1
250 TABLET, FILM COATED ORAL EVERY 24 HOURS
Status: DISCONTINUED | OUTPATIENT
Start: 2023-05-03 | End: 2023-05-10 | Stop reason: HOSPADM

## 2023-05-02 RX ORDER — LEVALBUTEROL INHALATION SOLUTION 1.25 MG/3ML
1.25 SOLUTION RESPIRATORY (INHALATION)
Status: DISCONTINUED | OUTPATIENT
Start: 2023-05-02 | End: 2023-05-10 | Stop reason: HOSPADM

## 2023-05-02 RX ADMIN — IPRATROPIUM BROMIDE 0.5 MG: 0.5 SOLUTION RESPIRATORY (INHALATION) at 00:45

## 2023-05-02 RX ADMIN — LEVALBUTEROL HYDROCHLORIDE 1.25 MG: 1.25 SOLUTION, CONCENTRATE RESPIRATORY (INHALATION) at 07:33

## 2023-05-02 RX ADMIN — LEVALBUTEROL HYDROCHLORIDE 1.25 MG: 1.25 SOLUTION RESPIRATORY (INHALATION) at 14:07

## 2023-05-02 RX ADMIN — LEVALBUTEROL HYDROCHLORIDE 1.25 MG: 1.25 SOLUTION, CONCENTRATE RESPIRATORY (INHALATION) at 00:45

## 2023-05-02 RX ADMIN — IPRATROPIUM BROMIDE 0.5 MG: 0.5 SOLUTION RESPIRATORY (INHALATION) at 14:07

## 2023-05-02 RX ADMIN — MELATONIN 3 MG: at 21:23

## 2023-05-02 RX ADMIN — LEVALBUTEROL HYDROCHLORIDE 1.25 MG: 1.25 SOLUTION RESPIRATORY (INHALATION) at 19:13

## 2023-05-02 RX ADMIN — CYANOCOBALAMIN TAB 500 MCG 1000 MCG: 500 TAB at 08:12

## 2023-05-02 RX ADMIN — BUDESONIDE 0.5 MG: 0.5 INHALANT ORAL at 07:33

## 2023-05-02 RX ADMIN — AZITHROMYCIN MONOHYDRATE 500 MG: 500 INJECTION, POWDER, LYOPHILIZED, FOR SOLUTION INTRAVENOUS at 21:23

## 2023-05-02 RX ADMIN — IPRATROPIUM BROMIDE 0.5 MG: 0.5 SOLUTION RESPIRATORY (INHALATION) at 07:33

## 2023-05-02 RX ADMIN — PRAVASTATIN SODIUM 80 MG: 80 TABLET ORAL at 16:53

## 2023-05-02 RX ADMIN — BUDESONIDE 0.5 MG: 0.5 INHALANT ORAL at 19:13

## 2023-05-02 RX ADMIN — ENOXAPARIN SODIUM 40 MG: 40 INJECTION SUBCUTANEOUS at 08:12

## 2023-05-02 RX ADMIN — METHYLPREDNISOLONE SODIUM SUCCINATE 40 MG: 40 INJECTION, POWDER, FOR SOLUTION INTRAMUSCULAR; INTRAVENOUS at 05:26

## 2023-05-02 RX ADMIN — GUAIFENESIN 1200 MG: 600 TABLET, EXTENDED RELEASE ORAL at 21:23

## 2023-05-02 RX ADMIN — TAMSULOSIN HYDROCHLORIDE 0.4 MG: 0.4 CAPSULE ORAL at 16:53

## 2023-05-02 RX ADMIN — FORMOTEROL FUMARATE DIHYDRATE 20 MCG: 20 SOLUTION RESPIRATORY (INHALATION) at 19:13

## 2023-05-02 RX ADMIN — METHYLPREDNISOLONE SODIUM SUCCINATE 40 MG: 40 INJECTION, POWDER, FOR SOLUTION INTRAMUSCULAR; INTRAVENOUS at 21:23

## 2023-05-02 RX ADMIN — IPRATROPIUM BROMIDE 0.5 MG: 0.5 SOLUTION RESPIRATORY (INHALATION) at 19:13

## 2023-05-02 RX ADMIN — CHLORHEXIDINE GLUCONATE 0.12% ORAL RINSE 15 ML: 1.2 LIQUID ORAL at 08:12

## 2023-05-02 RX ADMIN — GUAIFENESIN 1200 MG: 600 TABLET, EXTENDED RELEASE ORAL at 08:12

## 2023-05-02 RX ADMIN — ASPIRIN 81 MG CHEWABLE TABLET 81 MG: 81 TABLET CHEWABLE at 08:12

## 2023-05-02 NOTE — RESPIRATORY THERAPY NOTE
RT Protocol Note  Rhina Carrel  79 y o  male MRN: [id number]  Unit/Bed#: MICU 13 Encounter: [id number]    Assessment    Principal Problem:    COPD exacerbation ([place] [address] )  Active Problems:    Chronic respiratory failure with hypoxia and hypercapnia (HCC)    BPH with obstruction/lower urinary tract symptoms      Home Pulmonary Medications:  N/A       Past Medical History:   Diagnosis Date    Acute metabolic encephalopathy     Arthritis     Bladder mass     Cardiomyopathy (HCC)     Chest pain     COPD (chronic obstructive pulmonary disease) ([place] [address] )     COVID-19 virus infection 2023    CPAP (continuous positive airway pressure) dependence     Emphysema of lung (HCC)     Hypoxia     nocturnal    Left bundle branch block     Multiple pulmonary nodules     last assessed: 10/12/16    Pneumonia     Sleep apnea     Sleep apnea, obstructive     Smoker     Weight loss 2019     Social History     Socioeconomic History    Marital status: /Civil Union     Spouse name: None    Number of children: None    Years of education: None    Highest education level: None   Occupational History    None   Tobacco Use    Smoking status: Former     Packs/day: 2 50     Years: 42 00     Pack years: 105 00     Types: Cigarettes     Start date:      Quit date:      Years since quittin 3    Smokeless tobacco: Former    Tobacco comments:     1 ppd for 37 years, 2010 down to 5 cigs a day, is around second hand smoke   Vaping Use    Vaping Use: Never used   Substance and Sexual Activity    Alcohol use: Not Currently     Comment: rarely    Drug use: No    Sexual activity: Not Currently     Partners: Female   Other Topics Concern    None   Social History Narrative    Daily coffee consumption: 8 or more cups a day        Used to work in [employer]  On disability       Social Determinants of Health     Financial Resource Strain: Not on file   Food Insecurity: No Food Insecurity    Worried About Running Out of Food in the Last Year: Never true    [address] [facility] [address] in the Last Year: Never true   Transportation Needs: No Transportation Needs    Lack of Transportation (Medical): No    Lack of Transportation (Non-Medical): No   Physical Activity: Not on file   Stress: Not on file   Social Connections: Not on file   Intimate Partner Violence: Not on file   Housing Stability: Unknown    Unable to Pay for Housing in the Last Year: No    Number of Places Lived in the Last Year: Not on file    Unstable Housing in the Last Year: No       Subjective         Objective    Physical Exam:   Assessment Type: Assess only  General Appearance: Awake  Respiratory Pattern: Dyspnea with exertion  Chest Assessment: Chest expansion symmetrical  Bilateral Breath Sounds: Diminished, Coarse, Inspiratory wheezes, Expiratory wheezes  Cough: Non-productive, Moist, Congested  O2 Device: HFNC    Vitals:  Blood pressure 96/58, pulse 96, temperature 98 5 °F (36 9 °C), temperature source Oral, resp  rate (!) 26, height 5' (1 524 m), weight 47 6 kg (105 lb), SpO2 98 %  Results from last 7 days   Lab Units 05/01/23  1007   PH ART  7 391   PCO2 ART mm Hg 73 3*   PO2 ART mm Hg 77 2   HCO3 ART mmol/L 43 5*   BASE EXC ART mmol/L 15 5   O2 CONTENT ART mL/dL 15 1*   O2 HGB, ARTERIAL % 92 7*   ABG SOURCE  Brachial, Right   CANELO TEST  Yes       Imaging and other studies: I have personally reviewed pertinent reports  and I have personally reviewed pertinent films in PACS    O2 Device: HFNC     Plan    Respiratory Plan: Vent/NIV/HFNC  Airway Clearance Plan: Percussive Vest, Flutter     Resp Comments: Pt  evaluated at bedside per Airway Clearance protocol  Pt  Admitted with COPD exacerbation and remains on HFNC at this time  Pt  has moist/congested non productive cough at this time  Pt  ordered Flutter Valve and CPT via vest at this time by Pulmonary  Will proceed with Pulmonary's orders per Airway Clearance protocol

## 2023-05-02 NOTE — ASSESSMENT & PLAN NOTE
Malnutrition Findings:                                 BMI Findings: Body mass index is 21 01 kg/m²       Continue Cardiac Diet

## 2023-05-02 NOTE — ASSESSMENT & PLAN NOTE
Recent Labs     05/01/23  0424 05/01/23  0550 05/02/23  0453   PHVEN 7 262* 7 285* 7 433*   [id number] 121 5* 118 2* 61 7*   PO2VEN 30 0* 27 4* 52 0*   [id number] 53 5* 54 9* 40 3*     See A/P under COPD exacerbation

## 2023-05-02 NOTE — ASSESSMENT & PLAN NOTE
Wt Readings from Last 3 Encounters:   05/02/23 48 8 kg (107 lb 9 4 oz)   04/13/23 48 5 kg (107 lb)   03/27/23 46 3 kg (102 lb)     · Left Ventricle: Left ventricular cavity size is mildly dilated  Wall thickness is normal  The left ventricular ejection fraction is 20%  Systolic function is severely reduced  There is severe global hypokinesis with regional variation  Plan:  restart home torsemide  Add diamox 25 mg qd to help excrete bicarb as per critical care  We will follow-up with pulmonary  Continue lopressor 12 5mg bid  Add GDMT as tolerated  Cardiology following; ? AICD candidate

## 2023-05-02 NOTE — ASSESSMENT & PLAN NOTE
PFTs 2020: FEV1/FVC: 30%, FVC: 2L, 59% predicted, FEV1: 0 6L 22% predicted, %, %, DLCO 45%    Continue Xopenex/Atrovent nebs q6  Continue home pulmicort and formeterol  Solumedrol q12; continue taper and d/c home prednisone 5 mg qd  Continue BiPAP qhs  Continue azithromycin 500 mg x3 days (until 5/3/23); then back to 250 mg 2-3x per week  BiPAP qhs  Wean O2 to home 3L  SPO2>88%  Pulm consult

## 2023-05-02 NOTE — PROGRESS NOTES
Progress note - Palliative and Supportive Care   Beka Holley  79 y o  male [id number]    Patient Active Problem List   Diagnosis   • Nonobstructive atherosclerosis of coronary artery   • Nonischemic cardiomyopathy (HCC)   • Chronic obstructive pulmonary disease (HCC)   • Left bundle-branch block   • KEVIN and COPD overlap syndrome (HCC)   • Gastroesophageal reflux disease   • Impaired fasting glucose   • Osteoarthritis   • Osteoporosis   • Vitamin D deficiency   • Mood disorder (HCC)   • Other insomnia   • Macrocytosis without anemia   • Chronic respiratory failure with hypoxia and hypercapnia (HCC)   • BPH with obstruction/lower urinary tract symptoms   • Prostate cancer ([facility] [address] )   • Pulmonary nodules/lesions, multiple   • Protein-calorie malnutrition (HCC)   • Chronic HFrEF (heart failure with reduced ejection fraction) ([facility])   • Anemia   • Physical deconditioning   • Hyperglycemia   • COPD exacerbation ([facility])     Active issues specifically addressed today include:  -Acute on chronic hypercapnic and hypoxic respiratory failure  -End-stage COPD  -Goals of care  -Hospice options  -Palliative care patient    Plan:  1  Symptom management - will defer to medical ICU team for management  2  Goals - Patient would like to go home and be with the family  He does not want to go to any facility  Ongoing discussion regarding the hospice options  Patient's spouse is willing to explore further hospice options and how she can take him home  Code Status: Full - Level 1   Decisional apparatus:  Patient seems competent on my exam today and he expresses his wish to go home  If competence is lost, patient's substitute decision maker would default to spouse by [place]  Advance Directive / Living Will / POLST:  None on file    Interval history:       Patient reports doing the same as yesterday  No acute events overnight  He says he always has SOB and it is not worsening   He ate dinner and breakfast this AM  He denies any chest pain, nausea/vomiting, fevers/chills  He had medium loose stool x1 on 5/1/2023  Currently, for COPD, he is on BiPAP and continue Xopenex/atrovent nebs Q6hrs, Budesonide and Formoterol Q12hrs, Solumedrol 40mg IV BID and Azithromycin 500mg until 5/3/2023 then 250mg Qd  MEDICATIONS / ALLERGIES:     current meds:   Current Facility-Administered Medications   Medication Dose Route Frequency   • aspirin chewable tablet 81 mg  81 mg Oral Daily   • azithromycin (ZITHROMAX) 500 mg in sodium chloride 0 9 % 250 mL IVPB  500 mg Intravenous Q24H   • budesonide (PULMICORT) inhalation solution 0 5 mg  0 5 mg Nebulization Q12H   • chlorhexidine (PERIDEX) 0 12 % oral rinse 15 mL  15 mL Mouth/Throat Q12H GABE   • cyanocobalamin (VITAMIN B-12) tablet 1,000 mcg  1,000 mcg Oral Daily   • enoxaparin (LOVENOX) subcutaneous injection 40 mg  40 mg Subcutaneous Daily   • ergocalciferol (VITAMIN D2) capsule 50,000 Units  50,000 Units Oral Q28 Days   • formoterol (PERFOROMIST) nebulizer solution 20 mcg  20 mcg Nebulization Q12H   • guaiFENesin (MUCINEX) 12 hr tablet 1,200 mg  1,200 mg Oral Q12H GABE   • ipratropium (ATROVENT) 0 02 % inhalation solution 0 5 mg  0 5 mg Nebulization Q6H   • levalbuterol (XOPENEX) inhalation solution 1 25 mg  1 25 mg Nebulization Q6H   • melatonin tablet 3 mg  3 mg Oral HS   • methylPREDNISolone sodium succinate (Solu-MEDROL) injection 40 mg  40 mg Intravenous BID   • pravastatin (PRAVACHOL) tablet 80 mg  80 mg Oral Daily With Dinner   • tamsulosin (FLOMAX) capsule 0 4 mg  0 4 mg Oral Daily With Dinner       No Known Allergies    OBJECTIVE:    Physical Exam  Physical Exam  Constitutional:       Appearance: He is ill-appearing  HENT:      Head: Normocephalic  Mouth/Throat:      Pharynx: Oropharynx is clear  No oropharyngeal exudate  Eyes:      Extraocular Movements: Extraocular movements intact  Cardiovascular:      Rate and Rhythm: Normal rate and regular rhythm     Pulmonary:      Effort: "Tachypnea present  Breath sounds: Wheezing present  Chest:      Chest wall: Deformity present  Comments: Barrel chest  Abdominal:      Palpations: Abdomen is soft  Tenderness: There is no abdominal tenderness  Musculoskeletal:         General: Normal range of motion  Comments: Clubbed fingers bilaterally   Skin:     Coloration: Skin is pale  Neurological:      Mental Status: He is oriented to person, place, and time  Lab Results:   I have personally reviewed pertinent labs  , CBC:   Lab Results   Component Value Date    WBC 4 13 (L) 05/02/2023    HGB 9 9 (L) 05/02/2023    HCT 31 0 (L) 05/02/2023    MCV 99 (H) 05/02/2023     05/02/2023    MCH 31 6 05/02/2023    MCHC 31 9 05/02/2023    RDW 12 1 05/02/2023    MPV 9 4 05/02/2023    NRBC 0 05/02/2023   , CMP:  Lab Results   Component Value Date    SODIUM 130 (L) 05/02/2023    K 4 3 05/02/2023    CL 86 (L) 05/02/2023    CO2 >45 (HH) 05/02/2023    BUN 33 (H) 05/02/2023    CREATININE 0 67 05/02/2023    CALCIUM 9 0 05/02/2023    AST 25 05/02/2023    ALT 20 05/02/2023    ALKPHOS 51 05/02/2023    EGFR 100 05/02/2023   , PT/PTT:No results found for: PT, PTT  Imaging Studies: Reviewed  EKG, Pathology, and Other Studies: Reviewed    Counseling / Coordination of Care    Total floor / unit time spent today 45 minutes  Greater than 50% of total time was spent with the patient and / or family counseling and / or coordination of care  A description of the counseling / coordination of care: Chart review, medication review, discussion with Palliative team, goals of care discussion with patient and spouse, and supportive listening  Portions of this document may have been created using dictation software and as such some \"sound alike\" terms may have been generated by the system  Do not hesitate to contact me with any questions or clarifications    "

## 2023-05-02 NOTE — PROGRESS NOTES
INTERNAL MEDICINE RESIDENCY TRANSFER ACCEPTANCE NOTE     Name: Pop Minor  Age & Sex: 79 y o  male   MRN: [id number]  Unit/Bed#: MICU 13   Encounter: [id number]  Hospital Stay Days: 2    Accepting team: SLIM  Code Status: Level 1 - Full Code  Disposition: Patient requires Level 2 Step Down     ASSESSMENT/PLAN     Principal Problem:    COPD exacerbation ([place] [address] )  Active Problems:    Vitamin D deficiency    Mood disorder ([place] [address] )    Chronic respiratory failure with hypoxia and hypercapnia (HCC)    BPH with obstruction/lower urinary tract symptoms    Protein-calorie malnutrition ([place] [address] )    Chronic HFrEF (heart failure with reduced ejection fraction) ([facility])    HLD (hyperlipidemia)      HLD (hyperlipidemia)  Assessment & Plan  Continue home pravastatin 80 mg    Chronic HFrEF (heart failure with reduced ejection fraction) ([place] [address] )  Assessment & Plan  Wt Readings from Last 3 Encounters:   05/02/23 48 8 kg (107 lb 9 4 oz)   04/13/23 48 5 kg (107 lb)   03/27/23 46 3 kg (102 lb)     · Left Ventricle: Left ventricular cavity size is mildly dilated  Wall thickness is normal  The left ventricular ejection fraction is 20%  Systolic function is severely reduced  There is severe global hypokinesis with regional variation  Plan:  Holding home torsemide due to soft Bps  If restart home torsemide, add diamox 25 mg qd to help excrete bicarb  Continue lopressor 12 5mg bid  Add GDMT as tolerated  Cardiology following; ? AICD candidate        Protein-calorie malnutrition ([place] [address] )  Assessment & Plan  Malnutrition Findings:                                 BMI Findings: Body mass index is 21 01 kg/m²       Continue Cardiac Diet      BPH with obstruction/lower urinary tract symptoms  Assessment & Plan  Continue home flomax    Chronic respiratory failure with hypoxia and hypercapnia [facility])  Assessment & Plan  Recent Labs     05/01/23  0424 05/01/23  0550 05/02/23  0453   PHVEN 7 262* 7 285* 7 433*   [id number] 121 5* 118 2* 61 7*   PO2VEN 30 0* 27 4* 52 0*   [id number] 53 5* 54 9* 40 3*     See A/P under COPD exacerbation    Mood disorder ([address] )  Assessment & Plan  Continue home Lexapro     Vitamin D deficiency  Assessment & Plan  Continue home D2    * COPD exacerbation (HCC)  Assessment & Plan  PFTs 2020: FEV1/FVC: 30%, FVC: 2L, 59% predicted, FEV1: 0 6L 22% predicted, %, %, DLCO 45%    Continue Xopenex/Atrovent nebs q6  Continue home pulmicort and formeterol  Solumedrol q12  Continue BiPAP  Continue azithromycin 500 mg x3 days (until 5/3/23); then back to 250 mg qd  BiPAP qhs  Wean O2 to home 3L  SPO2>88%  Pulm consult        VTE Pharmacologic Prophylaxis: Enoxaparin (Lovenox)  VTE Mechanical Prophylaxis: sequential compression device    HOSPITAL COURSE     José Manuel Hu Jr  is a 79 y o  with PMH of COPD and HF with EF 30% who presented om 4/30 with increasing SOB x2 days with decrease PO intake  Pt follows closely with pulm and was seen by them in the office on Friday  Pt was told to increase his steroids and begin on azithromycin  Pt has been taking the azithro but has only been continuing his normal 5mg steroid dose daily  Pt was reportedly in the hospital for a similar event in February of this kim      Pt was treated as COPD exacerbation in the ED with solumedrol and duo-nebs  He was also started on nitro gtt due to concern for HF exacerbation with elevated BNP (17,000)  Pt was placed on BiPAP due to hypoxia and inevitable admitted to critical care for further management  Troponin and procal were negative, CMP significant for hyponatremia (129) which improved after NS 500mL boluse, strep pneumonia and legionella negative, UA with WBC and bacteria, vial panel + for Rhino/enterovirus  Pt was transitioned off the BiPAP to HFNC then to NC  Please refer to today's progress note for further information       OBJECTIVE     Vitals:    05/02/23 0733 05/02/23 0800 05/02/23 0900 05/02/23 1000   BP:  97/61 96/62 99/60   BP Location:  Right arm Pulse:  104 104 102   Resp:  (!) 24 (!) 31 (!) 23   Temp:  98 3 °F (36 8 °C)     TempSrc:  Oral     SpO2: 95% 97% 97% 98%   Weight:       Height:         I/O last 24 hours: In: 65 [P O :340; I V :30; IV Piggyback:250]  Out: 901 [Urine:901]    Constitutional:       Comments: Chronically ill appearing, somnolent, thin appearing   HENT:      Head: Normocephalic and atraumatic  Mouth/Throat:      Comments: BiPAP in place  Eyes:      Extraocular Movements: Extraocular movements intact  Conjunctiva/sclera: Conjunctivae normal    Cardiovascular:      Rate and Rhythm: Regular rhythm  Tachycardia present  Pulmonary:      Comments: BiPAP  Abdominal:      Palpations: Abdomen is soft  Musculoskeletal:      Right lower leg: Edema present  Left lower leg: Edema present  Comments: 1+ BLE edema   Skin:     General: Skin is warm and dry  Capillary Refill: Capillary refill takes less than 2 seconds  LABORATORY DATA     Labs: I have personally reviewed pertinent reports  Results from last 7 days   Lab Units 05/02/23  0453 05/01/23  0424 04/30/23  1804   WBC Thousand/uL 4 13* 2 94* 7 16   HEMOGLOBIN g/dL 9 9* 11 3* 12 1   HEMATOCRIT % 31 0* 38 4 39 5   PLATELETS Thousands/uL 231 223 269   NEUTROS PCT % 86* 89* 69   MONOS PCT % 6 1* 14*      Results from last 7 days   Lab Units 05/02/23  0453 05/01/23  0430 04/30/23  2241 04/30/23  1804   POTASSIUM mmol/L 4 3 4 6 4 3 4 7   CHLORIDE mmol/L 86* 87* 86* 86*   CO2 mmol/L >45* >45* >45* 42*   BUN mg/dL 33* 34* 37* 37*   CREATININE mg/dL 0 67 0 79 0 72 0 91   CALCIUM mg/dL 9 0 9 4 9 5 9 7   ALK PHOS U/L 51  --   --  68   ALT U/L 20  --   --  23   AST U/L 25  --   --  35                          Micro:  Lab Results   Component Value Date    BLOODCX No Growth at 24 hrs  05/01/2023    BLOODCX No Growth at 24 hrs  05/01/2023    BLOODCX No Growth After 5 Days  02/12/2023    BLOODCX No Growth After 5 Days  02/12/2023    URINECX Culture results to follow  "04/30/2023    URINECX <10,000 cfu/ml 02/13/2023    URINECX No Growth <1000 cfu/mL 03/29/2021    SPUTUMCULTUR 3+ Growth of 02/12/2023     IMAGING & DIAGNOSTIC TESTING     Imaging: I have personally reviewed pertinent reports  XR chest 1 view portable    Result Date: 5/1/2023  Impression: No acute cardiopulmonary disease  Workstation performed: [id number]     EKG, Pathology, and Other Studies: I have personally reviewed pertinent reports  ALLERGIES   No Known Allergies  MEDICATIONS     Current Facility-Administered Medications   Medication Dose Route Frequency Provider Last Rate   • aspirin  81 mg Oral Daily [facility],      • azithromycin  500 mg Intravenous Q24H Natanael Gomez MD Stopped (05/02/23 0000)   • [START ON 5/3/2023] azithromycin  250 mg Oral Q24H Elsy Navarro DO     • budesonide  0 5 mg Nebulization Q12H Natanael Gomez MD     • chlorhexidine  15 mL Mouth/Throat Q12H [facility] Natanael Gomez MD     • cyanocobalamin  1,000 mcg Oral Daily [facility], DO     • enoxaparin  40 mg Subcutaneous Daily Natanael Gomez MD     • ergocalciferol  50,000 Units Oral Q28 Days [facility] DO     • formoterol  20 mcg Nebulization Q12H Natanael Gomez MD     • guaiFENesin  1,200 mg Oral Q12H [facility] Milana Goode MD     • ipratropium  0 5 mg Nebulization Q6H Joceline Bruce DO     • levalbuterol  1 25 mg Nebulization Q6H Joceline Bruce DO     • melatonin  3 mg Oral HS Elsy Navarro DO     • methylPREDNISolone sodium succinate  40 mg Intravenous BID Delmar Benjamin MD     • pravastatin  80 mg Oral Daily With [address], DO     • tamsulosin  0 4 mg Oral Daily With [address], DO               Portions of the record may have been created with voice recognition software    Occasional wrong word or \"sound a like\" substitutions may have occurred due to the inherent limitations of voice recognition " software    Read the chart carefully and recognize, using context, where substitutions have occurred     ==  Higinio Mathews, [address] [facility]  Internal Medicine Residency PGY-1

## 2023-05-02 NOTE — PROGRESS NOTES
Daily Progress Note - Critical Care   Daniel Griffin  79 y o  male MRN: [id number]  Unit/Bed#: MICU 13 Encounter: [id number]        ----------------------------------------------------------------------------------------  HPI/24hr events: Tawny Rivera Jr  is a 79 y o  with PMH of COPD and HF with EF 30% who presented om 4/30 with increasing SOB x2 days with decrease PO intake  Pt follows closely with pulm and was seen by them in the office on Friday  Pt was told to increase his steroids and begin on azithromycin  Pt has been taking the azithro but has only been continuing his normal 5mg steroid dose daily  Pt was reportedly in the hospital for a similar event in February of this kim      Pt was treated as COPD exacerbation in the ED with solumedrol and duo-nebs  He was also started on nitro gtt due to concern for HF exacerbation with elevated BNP (17,000) and vascular congestion on CXR  Pt was placed on BiPAP due to hypoxia and inevitable admitted to critical care for further management  Troponin and procal were negative, CMP significant for hyponatremia (129), strep pneumonia and legionella negative, UA with WBC and bacteria, vial panel + for Rhino/enterovirus      ---------------------------------------------------------------------------------------  SUBJECTIVE  No overnight events  Patient feels like his SOB is improving and is back to his baseline  He denies any chest pain, abdominal pain, nausea, or vomiting  Increased appetite last night and this morning  No BM yesterday  Review of Systems  Review of systems was reviewed and negative unless stated above in HPI/24-hour events   ---------------------------------------------------------------------------------------  Assessment and Plan:    1  Acute hypoxic respiratory failure  2  COPD with acute exacerbation  3  Acute on chronic systolic heart failure with an EF of 30%  4  Hyponatremia with hypochloremia  5  Metabolic alkalosis  6   Encephalopathy-hypoxia and metabolic etiology  7  KEVIN-CPAP at bedtime  8  BPH     Neuro:   • Diagnosis: Acute encephalopathy, resolved  ? Plan:   - Suspected to be respiratory in origin  - CO2 increasing on repeat VBGs  • Diagnosis: Hx of mood disorder  ? Plan: Continue home Lexapro when able to tolerate PO        CV:   • Diagnosis: Heart failure with acute exacerbation  • Repeat Echo: EF of 20%  ? Plan:  - BNP significantly elevated upon arrival, monitor I/Os, appears euvolemic  • Diagnosis: Hx of HTN  ? Plan: Hold anti-hypertensives  • Diagnosis: Hx of HLD  ? Plan: Continue home statin        Pulm:  • Diagnosis: COPD with acute exacerbation  ? GOLD D  ? PFTs 2020: FEV1/FVC: 30%, FVC: 2L, 59% predicted, FEV1: 0 6L 22% predicted, %, %, DLCO 45%  ? Plan:   - Continue Xopenex/Atrovent nebs q6  - Continue home pulmicort and formeterol  - Solumedrol q8  - Continue BiPAP  - Continue azithromycin 500 mg x3 days (until 5/3/23); then back to 250 mg qd  • Diagnosis: Acute on chronic respiratory failure  ? Plan:   - Chronically on 3L NC at home  - Likely multifactorial including COPD and HF  - RP2 panel ordered to evaluate for viral illness as trigger  - Continue BiPAP hs, HFNC  - Wean supplemental oxygen as tolerated  - Goal sat >88%  - Palliative consult placed  - Goals of care discussion with family as recurrent issue        GI:   No active issues     :   No active issues     F/E/N:    • Fluids  ?  Plan: No maintenance  • Electrolytes:  ? Plan:   - Trend and replete as indicated  - Hyponatremia  - q8 BMP  - Avoid fast correction  - Urine osms and Na pending  • Nutrition:  ? Plan: NPO while on BiPAP        Heme/Onc:   No active issues     Endo:   No active issues     ID:   No active issues     MSK/Skin:   No active issues    Patient appropriate for transfer out of the ICU today?: No  Disposition: Continue Critical Care   Code Status: Level 1 - Full Code  ---------------------------------------------------------------------------------------  ICU CORE MEASURES    Prophylaxis   VTE Pharmacologic Prophylaxis: Enoxaparin (Lovenox)  VTE Mechanical Prophylaxis: sequential compression device  Stress Ulcer Prophylaxis: Prophylaxis Not Indicated       Invasive Devices Review  Invasive Devices     Peripheral Intravenous Line  Duration           Peripheral IV 23 Distal;Left;Upper;Ventral (anterior) Arm 1 day    Peripheral IV 23 Right Antecubital 1 day          Drain  Duration           External Urinary Catheter Small <1 day              Can any invasive devices be discontinued today? Not applicable  ---------------------------------------------------------------------------------------  OBJECTIVE    Vitals   Vitals:    23 0500 23 0530 23 0534 23 0600   BP: 96/67   97/62   BP Location:       Pulse: 86   88   Resp: 22   (!) 24   Temp:       TempSrc:       SpO2: 97%  97% 97%   Weight:  48 8 kg (107 lb 9 4 oz)     Height:         Temp (24hrs), Av 5 °F (36 9 °C), Min:98 1 °F (36 7 °C), Max:98 7 °F (37 1 °C)  Current: Temperature: 98 5 °F (36 9 °C)  HR: 80-90  BP: /60  RR: 20  SpO2: 95    Respiratory:  SpO2: SpO2: 97 %, SpO2 Activity: SpO2 Activity: At Rest, SpO2 Device: O2 Device: BiPAP       Invasive/non-invasive ventilation settings   Respiratory    Lab Data (Last 4 hours)    None         O2/Vent Data (Last 4 hours)      4  0534        Non-Invasive Ventilation Mode BiPAP HFNC (High flow)                  Physical Exam  Constitutional:       Comments: Cachectic, NAD   HENT:      Head: Normocephalic and atraumatic  Mouth/Throat:      Mouth: Mucous membranes are moist    Eyes:      Extraocular Movements: Extraocular movements intact  Conjunctiva/sclera: Conjunctivae normal       Pupils: Pupils are equal, round, and reactive to light  Cardiovascular:      Rate and Rhythm: Regular rhythm   Tachycardia present  Pulses: Normal pulses  Heart sounds: No murmur heard  Pulmonary:      Effort: No respiratory distress  Breath sounds: Wheezing present  Comments: Diminished breath sounds bilaterally, end expiratory wheezes  Abdominal:      General: Bowel sounds are normal  There is no distension  Palpations: Abdomen is soft  Tenderness: There is no abdominal tenderness  Musculoskeletal:      Right lower leg: Edema present  Left lower leg: Edema present  Comments: 1+ BLE edema   Skin:     General: Skin is warm and dry  Capillary Refill: Capillary refill takes less than 2 seconds  Neurological:      General: No focal deficit present  Mental Status: He is oriented to person, place, and time  Mental status is at baseline  Psychiatric:         Mood and Affect: Mood normal          Behavior: Behavior normal          Thought Content:  Thought content normal          Judgment: Judgment normal        Laboratory and Diagnostics:  Results from last 7 days   Lab Units 05/02/23  0453 05/01/23  0424 04/30/23  1804   WBC Thousand/uL 4 13* 2 94* 7 16   HEMOGLOBIN g/dL 9 9* 11 3* 12 1   HEMATOCRIT % 31 0* 38 4 39 5   PLATELETS Thousands/uL 231 223 269   NEUTROS PCT % 86* 89* 69   MONOS PCT % 6 1* 14*     Results from last 7 days   Lab Units 05/02/23  0453 05/01/23  0430 04/30/23  2241 04/30/23  1804   SODIUM mmol/L 130* 132* 132* 129*   POTASSIUM mmol/L 4 3 4 6 4 3 4 7   CHLORIDE mmol/L 86* 87* 86* 86*   CO2 mmol/L >45* >45* >45* 42*   ANION GAP mmol/L  --   --   --  1*   BUN mg/dL 33* 34* 37* 37*   CREATININE mg/dL 0 67 0 79 0 72 0 91   CALCIUM mg/dL 9 0 9 4 9 5 9 7   GLUCOSE RANDOM mg/dL 155* 145* 122 152*   ALT U/L 20  --   --  23   AST U/L 25  --   --  35   ALK PHOS U/L 51  --   --  68   ALBUMIN g/dL 2 8*  --   --  3 3*   TOTAL BILIRUBIN mg/dL 0 49  --   --  0 44                       ABG:  Results from last 7 days   Lab Units 05/01/23  1007   PH ART  7 391   PCO2 ART mm Hg 73 3* PO2 ART mm Hg 77 2   HCO3 ART mmol/L 43 5*   BASE EXC ART mmol/L 15 5   ABG SOURCE  Brachial, Right     VBG:  Results from last 7 days   Lab Units 05/02/23  0453 05/01/23  1007   PH JHONATHAN  7 433*  --    PCO2 JHONATHAN mm Hg 61 7*  --    PO2 JHONATHAN mm Hg 52 0*  --    HCO3 JHONATHAN mmol/L 40 3*  --    BASE EXC JHONATHAN mmol/L 13 8  --    ABG SOURCE   --  Brachial, Right     Results from last 7 days   Lab Units 04/30/23  1804   PROCALCITONIN ng/ml 0 10       Micro  Results from last 7 days   Lab Units 05/01/23  0038 05/01/23  0030 04/30/23  2233   BLOOD CULTURE  Received in Microbiology Lab  Culture in Progress  Received in Microbiology Lab  Culture in Progress  --    LEGIONELLA URINARY ANTIGEN   --   --  Negative   STREP PNEUMONIAE ANTIGEN, URINE   --   --  Negative       EKG: n/a  Imaging:  I have personally reviewed pertinent reports  and I have personally reviewed pertinent films in PACS    Intake and Output  I/O       04/30 0701  05/01 0700 05/01 0701  05/02 0700    P  O   100    I V  (mL/kg) 77 7 (1 6) 30 (0 6)    IV Piggyback 800 250    Total Intake(mL/kg) 877 7 (18 4) 380 (7 8)    Urine (mL/kg/hr) 90 751 (0 6)    Stool  0    Total Output 90 751    Net +787 7 -371          Unmeasured Urine Occurrence 2 x 175 x    Unmeasured Stool Occurrence  1 x        UOP: 751 ml/hr     Height and Weights   Height: 5' (152 4 cm)  IBW (Ideal Body Weight): 50 kg  Body mass index is 21 01 kg/m²  Weight (last 2 days)     Date/Time Weight    05/02/23 0530 48 8 (107 58)    05/01/23 1000 47 6 (105)    05/01/23 0551 47 7 (105 16)    04/30/23 2031 48 2 (106 26)            Nutrition       Diet Orders   (From admission, onward)             Start     Ordered    05/01/23 1141  Diet Cardiovascular; Sodium 2 GM  Diet effective now        References:    Nutrtion Support Algorithm Enteral vs  Parenteral   Question Answer Comment   Diet Type Cardiovascular    Cardiac Sodium 2 GM    RD to adjust diet per protocol?  Yes        05/01/23 1141              Active "Medications  Scheduled Meds:  Current Facility-Administered Medications   Medication Dose Route Frequency Provider Last Rate   • aspirin  81 mg [facility]     • azithromycin  500 mg Intravenous Q24H Cnidy Luciano MD Stopped (23 0000)   • budesonide  0 5 mg Nebulization Q12H Cindy Luciano MD     • chlorhexidine  15 mL Mouth/Throat Q12H [address] Cindy Luciano MD     • cyanocobalamin  1,000 mcg Oral Daily [facility] sia DO     • enoxaparin  40 mg Subcutaneous Daily Cindy Luciano MD     • ergocalciferol  50,000 Units Oral Q28 Days [facility] sia, DO     • formoterol  20 mcg Nebulization Q12H Cindy Luciano MD     • guaiFENesin  1,200 mg Oral Q12H [address] Shannan Lopez MD     • ipratropium  0 5 mg Nebulization Q6H Иван Wheeler DO     • levalbuterol  1 25 mg Nebulization Q6H Иван Wheeler DO     • melatonin  3 mg Oral HS Elsy Navarro DO     • methylPREDNISolone sodium succinate  40 mg Intravenous [facility] Cindy Luciano MD     • pravastatin  80 mg Oral Daily [employer] DO Melanie     • tamsulosin  0 4 mg Oral Daily With [employer] DO Melanie       Continuous Infusions:     PRN Meds:        Allergies   No Known Allergies  ---------------------------------------------------------------------------------------  Advance Directive and Living Will:      Power of :    POLST:    ---------------------------------------------------------------------------------------  Care Time Delivered:   No Critical Care time spent     [facility], DO      Portions of the record may have been created with voice recognition software  Occasional wrong word or \"sound a like\" substitutions may have occurred due to the inherent limitations of voice recognition software    Read the chart carefully and recognize, using context, where substitutions have occurred    "

## 2023-05-03 LAB
ANION GAP SERPL CALCULATED.3IONS-SCNC: 2 MMOL/L (ref 4–13)
BASOPHILS # BLD AUTO: 0 THOUSANDS/ÂΜL (ref 0–0.1)
BASOPHILS NFR BLD AUTO: 0 % (ref 0–1)
BUN SERPL-MCNC: 22 MG/DL (ref 5–25)
CALCIUM SERPL-MCNC: 8.6 MG/DL (ref 8.3–10.1)
CHLORIDE SERPL-SCNC: 89 MMOL/L (ref 96–108)
CO2 SERPL-SCNC: 39 MMOL/L (ref 21–32)
CREAT SERPL-MCNC: 0.55 MG/DL (ref 0.6–1.3)
EOSINOPHIL # BLD AUTO: 0 THOUSAND/ÂΜL (ref 0–0.61)
EOSINOPHIL NFR BLD AUTO: 0 % (ref 0–6)
ERYTHROCYTE [DISTWIDTH] IN BLOOD BY AUTOMATED COUNT: 12.1 % (ref 11.6–15.1)
GFR SERPL CREATININE-BSD FRML MDRD: 108 ML/MIN/1.73SQ M
GLUCOSE SERPL-MCNC: 201 MG/DL (ref 65–140)
HCT VFR BLD AUTO: 31 % (ref 36.5–49.3)
HGB BLD-MCNC: 9.8 G/DL (ref 12–17)
IMM GRANULOCYTES # BLD AUTO: 0.04 THOUSAND/UL (ref 0–0.2)
IMM GRANULOCYTES NFR BLD AUTO: 1 % (ref 0–2)
LYMPHOCYTES # BLD AUTO: 0.32 THOUSANDS/ÂΜL (ref 0.6–4.47)
LYMPHOCYTES NFR BLD AUTO: 5 % (ref 14–44)
MCH RBC QN AUTO: 31.4 PG (ref 26.8–34.3)
MCHC RBC AUTO-ENTMCNC: 31.6 G/DL (ref 31.4–37.4)
MCV RBC AUTO: 99 FL (ref 82–98)
MONOCYTES # BLD AUTO: 0.36 THOUSAND/ÂΜL (ref 0.17–1.22)
MONOCYTES NFR BLD AUTO: 5 % (ref 4–12)
NEUTROPHILS # BLD AUTO: 5.98 THOUSANDS/ÂΜL (ref 1.85–7.62)
NEUTS SEG NFR BLD AUTO: 89 % (ref 43–75)
NRBC BLD AUTO-RTO: 0 /100 WBCS
PLATELET # BLD AUTO: 224 THOUSANDS/UL (ref 149–390)
PMV BLD AUTO: 9.2 FL (ref 8.9–12.7)
POTASSIUM SERPL-SCNC: 4.2 MMOL/L (ref 3.5–5.3)
RBC # BLD AUTO: 3.12 MILLION/UL (ref 3.88–5.62)
SODIUM SERPL-SCNC: 130 MMOL/L (ref 135–147)
WBC # BLD AUTO: 6.7 THOUSAND/UL (ref 4.31–10.16)

## 2023-05-03 RX ORDER — PREDNISONE 20 MG/1
20 TABLET ORAL DAILY
Status: DISCONTINUED | OUTPATIENT
Start: 2023-05-10 | End: 2023-05-06

## 2023-05-03 RX ORDER — PREDNISONE 20 MG/1
40 TABLET ORAL DAILY
Status: COMPLETED | OUTPATIENT
Start: 2023-05-04 | End: 2023-05-06

## 2023-05-03 RX ORDER — ACETAZOLAMIDE 500 MG/5ML
250 INJECTION, POWDER, LYOPHILIZED, FOR SOLUTION INTRAVENOUS DAILY
Status: COMPLETED | OUTPATIENT
Start: 2023-05-03 | End: 2023-05-06

## 2023-05-03 RX ORDER — PREDNISONE 10 MG/1
10 TABLET ORAL DAILY
Status: DISCONTINUED | OUTPATIENT
Start: 2023-05-13 | End: 2023-05-06

## 2023-05-03 RX ORDER — TORSEMIDE 20 MG/1
20 TABLET ORAL DAILY
Status: DISCONTINUED | OUTPATIENT
Start: 2023-05-03 | End: 2023-05-10 | Stop reason: HOSPADM

## 2023-05-03 RX ORDER — PREDNISONE 5 MG/1
5 TABLET ORAL DAILY
Status: DISCONTINUED | OUTPATIENT
Start: 2023-05-16 | End: 2023-05-06

## 2023-05-03 RX ORDER — METHYLPREDNISOLONE SODIUM SUCCINATE 40 MG/ML
40 INJECTION, POWDER, LYOPHILIZED, FOR SOLUTION INTRAMUSCULAR; INTRAVENOUS DAILY
Status: DISCONTINUED | OUTPATIENT
Start: 2023-05-04 | End: 2023-05-03

## 2023-05-03 RX ADMIN — GUAIFENESIN 1200 MG: 600 TABLET, EXTENDED RELEASE ORAL at 08:13

## 2023-05-03 RX ADMIN — LEVALBUTEROL HYDROCHLORIDE 1.25 MG: 1.25 SOLUTION RESPIRATORY (INHALATION) at 13:57

## 2023-05-03 RX ADMIN — FORMOTEROL FUMARATE DIHYDRATE 20 MCG: 20 SOLUTION RESPIRATORY (INHALATION) at 08:00

## 2023-05-03 RX ADMIN — IPRATROPIUM BROMIDE 0.5 MG: 0.5 SOLUTION RESPIRATORY (INHALATION) at 08:00

## 2023-05-03 RX ADMIN — IPRATROPIUM BROMIDE 0.5 MG: 0.5 SOLUTION RESPIRATORY (INHALATION) at 00:16

## 2023-05-03 RX ADMIN — TORSEMIDE 20 MG: 20 TABLET ORAL at 12:44

## 2023-05-03 RX ADMIN — LEVALBUTEROL HYDROCHLORIDE 1.25 MG: 1.25 SOLUTION RESPIRATORY (INHALATION) at 21:20

## 2023-05-03 RX ADMIN — ASPIRIN 81 MG CHEWABLE TABLET 81 MG: 81 TABLET CHEWABLE at 08:13

## 2023-05-03 RX ADMIN — TAMSULOSIN HYDROCHLORIDE 0.4 MG: 0.4 CAPSULE ORAL at 16:45

## 2023-05-03 RX ADMIN — METHYLPREDNISOLONE SODIUM SUCCINATE 40 MG: 40 INJECTION, POWDER, FOR SOLUTION INTRAMUSCULAR; INTRAVENOUS at 08:13

## 2023-05-03 RX ADMIN — ACETAZOLAMIDE SODIUM 250 MG: 500 INJECTION, POWDER, LYOPHILIZED, FOR SOLUTION INTRAVENOUS at 12:44

## 2023-05-03 RX ADMIN — ENOXAPARIN SODIUM 40 MG: 40 INJECTION SUBCUTANEOUS at 08:13

## 2023-05-03 RX ADMIN — MELATONIN 3 MG: at 21:18

## 2023-05-03 RX ADMIN — IPRATROPIUM BROMIDE 0.5 MG: 0.5 SOLUTION RESPIRATORY (INHALATION) at 21:20

## 2023-05-03 RX ADMIN — BUDESONIDE 0.5 MG: 0.5 INHALANT ORAL at 08:00

## 2023-05-03 RX ADMIN — BUDESONIDE 0.5 MG: 0.5 INHALANT ORAL at 21:20

## 2023-05-03 RX ADMIN — LEVALBUTEROL HYDROCHLORIDE 1.25 MG: 1.25 SOLUTION RESPIRATORY (INHALATION) at 08:00

## 2023-05-03 RX ADMIN — IPRATROPIUM BROMIDE 0.5 MG: 0.5 SOLUTION RESPIRATORY (INHALATION) at 13:57

## 2023-05-03 RX ADMIN — LEVALBUTEROL HYDROCHLORIDE 1.25 MG: 1.25 SOLUTION RESPIRATORY (INHALATION) at 00:16

## 2023-05-03 RX ADMIN — CYANOCOBALAMIN TAB 500 MCG 1000 MCG: 500 TAB at 08:13

## 2023-05-03 RX ADMIN — AZITHROMYCIN MONOHYDRATE 250 MG: 250 TABLET ORAL at 20:17

## 2023-05-03 RX ADMIN — GUAIFENESIN 1200 MG: 600 TABLET, EXTENDED RELEASE ORAL at 20:17

## 2023-05-03 NOTE — PROGRESS NOTES
Progress note - Palliative and Supportive Care   Cyndy Andres  79 y o  male [id number]    Patient Active Problem List   Diagnosis   • Nonobstructive atherosclerosis of coronary artery   • Nonischemic cardiomyopathy (HCC)   • Chronic obstructive pulmonary disease (HCC)   • Left bundle-branch block   • KEVIN and COPD overlap syndrome (HCC)   • Gastroesophageal reflux disease   • Impaired fasting glucose   • Osteoarthritis   • Osteoporosis   • Vitamin D deficiency   • Mood disorder (HCC)   • Other insomnia   • Macrocytosis without anemia   • Chronic respiratory failure with hypoxia and hypercapnia (HCC)   • BPH with obstruction/lower urinary tract symptoms   • Prostate cancer ([facility] [address] )   • Pulmonary nodules/lesions, multiple   • Protein-calorie malnutrition (HCC)   • Chronic HFrEF (heart failure with reduced ejection fraction) ([facility])   • Anemia   • Physical deconditioning   • Hyperglycemia   • COPD exacerbation ([facility])   • HLD (hyperlipidemia)     Active issues specifically addressed today include:  -Acute on chronic hypercapnic and hypoxic respiratory failure  -End-stage COPD  -Goals of care  -Palliative care patient    Plan:  1  Symptom management - will defer to medical ICU team for management  2  Goals - Had a long discussion with patient and spouse yesterday regarding goals of care, hospice cares and risks/benefits of aggressive interventions  Patient does not wish to undergo significant life-prolonging care but current care plan is acceptable to prolong his time with his spouse and family  Awaiting the input from patient's primary pulmonologist Dr Cherie Ortiz  Possible downgrade  3  Psychosocial support: let the patient and spouse know that Palliative team will be supportive along the way in their decision making  Code Status: Full - Level 1   Decisional apparatus:  Patient seems competent on my exam today and he expresses his wish to go home   If competence is lost, patient's substitute decision maker would default to spouse by [place]  Advance Directive / Living Will / POLST:  None on file    Interval history:       Patient reports feeling better today  No acute events overnight  No acute SOB  He is alert and is finishing his breakfast      MEDICATIONS / ALLERGIES:     current meds:   Current Facility-Administered Medications   Medication Dose Route Frequency   • acetaZOLAMIDE (DIAMOX) injection 250 mg  250 mg Intravenous Daily   • aspirin chewable tablet 81 mg  81 mg Oral Daily   • azithromycin (ZITHROMAX) tablet 250 mg  250 mg Oral Q24H   • budesonide (PULMICORT) inhalation solution 0 5 mg  0 5 mg Nebulization Q12H   • cyanocobalamin (VITAMIN B-12) tablet 1,000 mcg  1,000 mcg Oral Daily   • enoxaparin (LOVENOX) subcutaneous injection 40 mg  40 mg Subcutaneous Daily   • ergocalciferol (VITAMIN D2) capsule 50,000 Units  50,000 Units Oral Q28 Days   • formoterol (PERFOROMIST) nebulizer solution 20 mcg  20 mcg Nebulization Q12H   • guaiFENesin (MUCINEX) 12 hr tablet 1,200 mg  1,200 mg Oral Q12H GABE   • ipratropium (ATROVENT) 0 02 % inhalation solution 0 5 mg  0 5 mg Nebulization Q6H   • levalbuterol (XOPENEX) inhalation solution 1 25 mg  1 25 mg Nebulization Q6H   • melatonin tablet 3 mg  3 mg Oral HS   • [START ON 5/4/2023] methylPREDNISolone sodium succinate (Solu-MEDROL) injection 40 mg  40 mg Intravenous Daily   • pravastatin (PRAVACHOL) tablet 80 mg  80 mg Oral Daily With D[employer]   • tamsulosin (FLOMAX) capsule 0 4 mg  0 4 mg Oral Daily With Dinner   • torsemide (DEMADEX) tablet 20 mg  20 mg Oral Daily       No Known Allergies    OBJECTIVE:    Physical Exam  Physical Exam  Constitutional:       Appearance: He is ill-appearing  HENT:      Head: Normocephalic  Mouth/Throat:      Pharynx: Oropharynx is clear  No oropharyngeal exudate  Eyes:      Extraocular Movements: Extraocular movements intact  Cardiovascular:      Rate and Rhythm: Normal rate and regular rhythm     Pulmonary:      Effort: Tachypnea "present  Breath sounds: Wheezing present  Chest:      Chest wall: Deformity present  Comments: Barrel chest  Abdominal:      Palpations: Abdomen is soft  Tenderness: There is no abdominal tenderness  Musculoskeletal:         General: Normal range of motion  Comments: Clubbed fingers bilaterally   Skin:     Coloration: Skin is pale  Neurological:      Mental Status: He is alert and oriented to person, place, and time  Lab Results:   I have personally reviewed pertinent labs  , CBC:   Lab Results   Component Value Date    WBC 6 70 05/03/2023    HGB 9 8 (L) 05/03/2023    HCT 31 0 (L) 05/03/2023    MCV 99 (H) 05/03/2023     05/03/2023    MCH 31 4 05/03/2023    MCHC 31 6 05/03/2023    RDW 12 1 05/03/2023    MPV 9 2 05/03/2023    NRBC 0 05/03/2023   , CMP:  Lab Results   Component Value Date    SODIUM 130 (L) 05/03/2023    K 4 2 05/03/2023    CL 89 (L) 05/03/2023    CO2 39 (H) 05/03/2023    BUN 22 05/03/2023    CREATININE 0 55 (L) 05/03/2023    CALCIUM 8 6 05/03/2023    EGFR 108 05/03/2023   , PT/PTT:No results found for: PT, PTT  Imaging Studies: Reviewed  EKG, Pathology, and Other Studies: Reviewed    Counseling / Coordination of Care    Total floor / unit time spent today 35 minutes  Greater than 50% of total time was spent with the patient and / or family counseling and / or coordination of care  A description of the counseling / coordination of care: Chart review, medication review, discussion with Palliative team, goals of care discussion with patient and spouse, and supportive listening  Portions of this document may have been created using dictation software and as such some \"sound alike\" terms may have been generated by the system  Do not hesitate to contact me with any questions or clarifications    "

## 2023-05-03 NOTE — PROGRESS NOTES
Daily Progress Note - Critical Care   Purnima Doll  79 y o  male MRN: [id number]  Unit/Bed#: MICU 13 Encounter: [id number]        ----------------------------------------------------------------------------------------  HPI/24hr events: Ciro Multani Jr  is a 79 y o  with PMH of COPD and HF with EF 30% who presented om 4/30 with increasing SOB x2 days with decrease PO intake  Pt follows closely with pulm and was seen by them in the office on Friday  Pt was told to increase his steroids and begin on azithromycin  Pt has been taking the azithro but has only been continuing his normal 5mg steroid dose daily  Pt was reportedly in the hospital for a similar event in February of this kim      Pt was treated as COPD exacerbation in the ED with solumedrol and duo-nebs  He was also started on nitro gtt due to concern for HF exacerbation with elevated BNP (17,000) and vascular congestion on CXR  Pt was placed on BiPAP due to hypoxia and inevitable admitted to critical care for further management  Troponin and procal were negative, CMP significant for hyponatremia (129), strep pneumonia and legionella negative, UA with WBC and bacteria, vial panel + for Rhino/enterovirus      ---------------------------------------------------------------------------------------  SUBJECTIVE  No overnight events  Patient feels his SOB is back to baseline  Tolerated BiPAP overnight  No BM yesterday but notes increased appetite  He denies chest pain, nausea, or vomiting  Review of Systems  Review of systems was reviewed and negative unless stated above in HPI/24-hour events   ---------------------------------------------------------------------------------------  Assessment and Plan:    Assessment and Plan:     1  Acute hypoxic respiratory failure  2  COPD with acute exacerbation  3  Acute on chronic systolic heart failure with an EF of 30%  4  Hyponatremia with hypochloremia  5  Metabolic alkalosis  6   Encephalopathy-hypoxia and metabolic etiology  7  KEVIN-CPAP at bedtime  8  BPH     Neuro:   • Diagnosis: Acute encephalopathy, resolved  ? Plan:   - Suspected to be respiratory in origin  - CO2 increasing on repeat VBGs  • Diagnosis: Hx of mood disorder  ? Plan: Continue home Lexapro when able to tolerate PO        CV:   • Diagnosis: Heart failure with acute exacerbation  ? Repeat Echo: EF of 20%  ? Plan:  - BNP significantly elevated upon arrival, monitor I/Os, appears euvolemic  - Consider cardiology consult for AICD  - If torsemide is restarted, consider diamox qd to help excrete bicarb  • Diagnosis: Hx of HTN  ? Plan: Hold anti-hypertensives  • Diagnosis: Hx of HLD  ? Plan: Continue home statin        Pulm:  • Diagnosis: COPD with acute exacerbation  ? GOLD D  ? PFTs 2020: FEV1/FVC: 30%, FVC: 2L, 59% predicted, FEV1: 0 6L 22% predicted, %, %, DLCO 45%  ? Plan:   - Continue Xopenex/Atrovent nebs q6  - Continue home pulmicort and formeterol  - Solumedrol q8  - Continue BiPAP  - Continue azithromycin 500 mg x3 days (until 5/3/23); then back to 250 mg qd  • Diagnosis: Acute on chronic respiratory failure  ? Plan:   - Chronically on 3L NC at home  - Likely multifactorial including COPD and HF  - RP2 panel ordered to evaluate for viral illness as trigger  - Continue BiPAP hs, HFNC  - Wean supplemental oxygen as tolerated  - Goal sat >88%  - Palliative consult placed  - Goals of care discussion with family as recurrent issue        GI:   No active issues     :   No active issues     F/E/N:    • Fluids  ?  Plan: No maintenance  • Electrolytes:  ? Plan:   - Trend and replete as indicated  - Hyponatremia  - q8 BMP  - Avoid fast correction  - Urine osms and Na pending  • Nutrition:  ? Plan: NPO while on BiPAP        Heme/Onc:   No active issues     Endo:   No active issues     ID:        MSK/Skin:   No active issues     Patient appropriate for transfer out of the ICU today?: Patient does not meet criteria for referral to the ICU Follow-Up Clinic; referral has not been made  Disposition: Transfer to Stepdown Level 2  Code Status: Level 1 - Full Code  ---------------------------------------------------------------------------------------  ICU CORE MEASURES    Prophylaxis   VTE Pharmacologic Prophylaxis: Enoxaparin (Lovenox)  VTE Mechanical Prophylaxis: sequential compression device  Stress Ulcer Prophylaxis: Prophylaxis Not Indicated       Invasive Devices Review  Invasive Devices     Peripheral Intravenous Line  Duration           Peripheral IV 23 Right Antecubital 2 days    Peripheral IV 23 Left;Ventral (anterior) Forearm <1 day              Can any invasive devices be discontinued today? Not applicable  ---------------------------------------------------------------------------------------  OBJECTIVE    Vitals   Vitals:    23 0300 23 0306 23 0500 23 0600   BP: 105/69  96/66    Pulse: 86  74    Resp: 22  21    Temp:   97 6 °F (36 4 °C)    TempSrc:   Axillary    SpO2: 97% 98% 98%    Weight:    49 4 kg (108 lb 14 5 oz)   Height:         Temp (24hrs), Av 9 °F (36 6 °C), Min:97 6 °F (36 4 °C), Max:98 3 °F (36 8 °C)  Current: Temperature: 97 6 °F (36 4 °C)  HR:   BP: /60  RR: 15  SpO2: 98    Respiratory:  SpO2: SpO2: 98 %, SpO2 Activity: SpO2 Activity: At Rest, SpO2 Device: O2 Device: Mid flow nasal cannula       Invasive/non-invasive ventilation settings   Respiratory    Lab Data (Last 4 hours)    None         O2/Vent Data (Last 4 hours)    None                Physical Exam  Constitutional:       General: He is not in acute distress  Appearance: He is ill-appearing  Comments: Cachectic    HENT:      Head: Normocephalic and atraumatic  Eyes:      Extraocular Movements: Extraocular movements intact  Conjunctiva/sclera: Conjunctivae normal       Pupils: Pupils are equal, round, and reactive to light  Cardiovascular:      Rate and Rhythm: Normal rate and regular rhythm        Pulses: Normal pulses  Pulmonary:      Effort: Pulmonary effort is normal  No respiratory distress  Comments: Diminished breath sounds b/l; end expiratory wheezes throughout lung field  Abdominal:      General: There is no distension  Palpations: Abdomen is soft  Tenderness: There is no abdominal tenderness  Musculoskeletal:      Comments: Trace BLE edema   Skin:     General: Skin is warm and dry  Neurological:      General: No focal deficit present  Mental Status: He is oriented to person, place, and time  Mental status is at baseline  Psychiatric:         Mood and Affect: Mood normal          Behavior: Behavior normal          Thought Content:  Thought content normal          Judgment: Judgment normal        Laboratory and Diagnostics:  Results from last 7 days   Lab Units 05/03/23  0520 05/02/23 0453 05/01/23  0424 04/30/23  1804   WBC Thousand/uL 6 70 4 13* 2 94* 7 16   HEMOGLOBIN g/dL 9 8* 9 9* 11 3* 12 1   HEMATOCRIT % 31 0* 31 0* 38 4 39 5   PLATELETS Thousands/uL 224 231 223 269   NEUTROS PCT % 89* 86* 89* 69   MONOS PCT % 5 6 1* 14*     Results from last 7 days   Lab Units 05/03/23  0520 05/02/23 0453 05/01/23  0430 04/30/23  2241 04/30/23  1804   SODIUM mmol/L 130* 130* 132* 132* 129*   POTASSIUM mmol/L 4 2 4 3 4 6 4 3 4 7   CHLORIDE mmol/L 89* 86* 87* 86* 86*   CO2 mmol/L 39* >45* >45* >45* 42*   ANION GAP mmol/L 2*  --   --   --  1*   BUN mg/dL 22 33* 34* 37* 37*   CREATININE mg/dL 0 55* 0 67 0 79 0 72 0 91   CALCIUM mg/dL 8 6 9 0 9 4 9 5 9 7   GLUCOSE RANDOM mg/dL 201* 155* 145* 122 152*   ALT U/L  --  20  --   --  23   AST U/L  --  25  --   --  35   ALK PHOS U/L  --  51  --   --  68   ALBUMIN g/dL  --  2 8*  --   --  3 3*   TOTAL BILIRUBIN mg/dL  --  0 49  --   --  0 44                       ABG:  Results from last 7 days   Lab Units 05/01/23  1007   PH ART  7 391   PCO2 ART mm Hg 73 3*   PO2 ART mm Hg 77 2   HCO3 ART mmol/L 43 5*   BASE EXC ART mmol/L 15 5   ABG SOURCE  Brachial, Right     VBG:  Results from last 7 days   Lab Units 05/02/23  0453 05/01/23  1007   PH JHONATHAN  7 433*  --    PCO2 JHONATHAN mm Hg 61 7*  --    PO2 JHONATHAN mm Hg 52 0*  --    HCO3 JHONATHAN mmol/L 40 3*  --    BASE EXC JHONATHAN mmol/L 13 8  --    ABG SOURCE   --  Brachial, Right     Results from last 7 days   Lab Units 05/02/23  0453 04/30/23  1804   PROCALCITONIN ng/ml 0 05 0 10       Micro  Results from last 7 days   Lab Units 05/01/23  0038 05/01/23  0030 04/30/23  2234 04/30/23  2233   BLOOD CULTURE  No Growth at 48 hrs  No Growth at 48 hrs   --   --    URINE CULTURE   --   --  Culture results to follow  --    LEGIONELLA URINARY ANTIGEN   --   --   --  Negative   STREP PNEUMONIAE ANTIGEN, URINE   --   --   --  Negative       EKG: n/a  Imaging:  I have personally reviewed pertinent films in PACS    Intake and Output  I/O       05/01 0701 05/02 0700 05/02 0701 05/03 0700 05/03 0701  05/04 0700    P  O  100 240     I V  (mL/kg) 30 (0 6)      IV Piggyback 250 250     Total Intake(mL/kg) 380 (7 8) 490 (9 9)     Urine (mL/kg/hr) 751 (0 6) 600 (0 5)     Stool 0 0     Total Output 751 600     Net -371 -110            Unmeasured Urine Occurrence 175 x 2 x     Unmeasured Stool Occurrence 1 x 1 x         UOP: 600 ml/hr     Height and Weights   Height: 5' (152 4 cm)  IBW (Ideal Body Weight): 50 kg  Body mass index is 21 27 kg/m²  Weight (last 2 days)     Date/Time Weight    05/03/23 0600 49 4 (108 91)    05/02/23 0530 48 8 (107 58)    05/01/23 1000 47 6 (105)    05/01/23 0551 47 7 (105 16)            Nutrition       Diet Orders   (From admission, onward)             Start     Ordered    05/01/23 1141  Diet Cardiovascular; Sodium 2 GM  Diet effective now        References:    Nutrtion Support Algorithm Enteral vs  Parenteral   Question Answer Comment   Diet Type Cardiovascular    Cardiac Sodium 2 GM    RD to adjust diet per protocol?  Yes        05/01/23 1141                Active Medications  Scheduled Meds:  Current Facility-Administered "Medications   Medication Dose Route Frequency Provider Last Rate   • aspirin  81 mg Oral Daily [facility], DO     • azithromycin  250 mg Oral Q24H [facility], DO     • budesonide  0 5 mg Nebulization Q12H [facility], DO     • cyanocobalamin  1,000 mcg Oral Daily [facility], DO     • enoxaparin  40 mg Subcutaneous Daily [facility], DO     • ergocalciferol  50,000 Units Oral Q28 Days [facility], DO     • formoterol  20 mcg Nebulization Q12H [facility], DO     • guaiFENesin  1,200 mg Oral Q12H [address] [facility], DO     • ipratropium  0 5 mg Nebulization Q6H [facility], DO     • levalbuterol  1 25 mg Nebulization Q6H [facility], DO     • melatonin  3 mg Oral HS [facility], DO     • methylPREDNISolone sodium succinate  40 mg Intravenous BID [facility], DO     • pravastatin  80 mg Oral Daily With [facility]lim[facility]ren , DO     • tamsulosin  0 4 mg Oral Daily With [employer] [facility], DO       Continuous Infusions:     PRN Meds:        Allergies   No Known Allergies  ---------------------------------------------------------------------------------------  Advance Directive and Living Will:      Power of :    POLST:    ---------------------------------------------------------------------------------------  Care Time Delivered:   No Critical Care time spent     [facility], DO      Portions of the record may have been created with voice recognition software  Occasional wrong word or \"sound a like\" substitutions may have occurred due to the inherent limitations of voice recognition software    Read the chart carefully and recognize, using context, where substitutions have occurred    "

## 2023-05-04 LAB
ANION GAP SERPL CALCULATED.3IONS-SCNC: 0 MMOL/L (ref 4–13)
BACTERIA UR CULT: ABNORMAL
BACTERIA UR CULT: ABNORMAL
BASOPHILS # BLD AUTO: 0 THOUSANDS/ÂΜL (ref 0–0.1)
BASOPHILS NFR BLD AUTO: 0 % (ref 0–1)
BUN SERPL-MCNC: 19 MG/DL (ref 5–25)
CALCIUM SERPL-MCNC: 8.6 MG/DL (ref 8.3–10.1)
CHLORIDE SERPL-SCNC: 90 MMOL/L (ref 96–108)
CO2 SERPL-SCNC: 43 MMOL/L (ref 21–32)
CREAT SERPL-MCNC: 0.56 MG/DL (ref 0.6–1.3)
EOSINOPHIL # BLD AUTO: 0.01 THOUSAND/ÂΜL (ref 0–0.61)
EOSINOPHIL NFR BLD AUTO: 0 % (ref 0–6)
ERYTHROCYTE [DISTWIDTH] IN BLOOD BY AUTOMATED COUNT: 11.9 % (ref 11.6–15.1)
GFR SERPL CREATININE-BSD FRML MDRD: 107 ML/MIN/1.73SQ M
GLUCOSE SERPL-MCNC: 77 MG/DL (ref 65–140)
HCT VFR BLD AUTO: 32.8 % (ref 36.5–49.3)
HGB BLD-MCNC: 10 G/DL (ref 12–17)
IMM GRANULOCYTES # BLD AUTO: 0.03 THOUSAND/UL (ref 0–0.2)
IMM GRANULOCYTES NFR BLD AUTO: 1 % (ref 0–2)
LYMPHOCYTES # BLD AUTO: 0.92 THOUSANDS/ÂΜL (ref 0.6–4.47)
LYMPHOCYTES NFR BLD AUTO: 14 % (ref 14–44)
MCH RBC QN AUTO: 31 PG (ref 26.8–34.3)
MCHC RBC AUTO-ENTMCNC: 30.5 G/DL (ref 31.4–37.4)
MCV RBC AUTO: 102 FL (ref 82–98)
MONOCYTES # BLD AUTO: 0.87 THOUSAND/ÂΜL (ref 0.17–1.22)
MONOCYTES NFR BLD AUTO: 13 % (ref 4–12)
NEUTROPHILS # BLD AUTO: 4.71 THOUSANDS/ÂΜL (ref 1.85–7.62)
NEUTS SEG NFR BLD AUTO: 72 % (ref 43–75)
NRBC BLD AUTO-RTO: 0 /100 WBCS
PLATELET # BLD AUTO: 235 THOUSANDS/UL (ref 149–390)
PMV BLD AUTO: 9.7 FL (ref 8.9–12.7)
POTASSIUM SERPL-SCNC: 3.6 MMOL/L (ref 3.5–5.3)
RBC # BLD AUTO: 3.23 MILLION/UL (ref 3.88–5.62)
SODIUM SERPL-SCNC: 133 MMOL/L (ref 135–147)
WBC # BLD AUTO: 6.54 THOUSAND/UL (ref 4.31–10.16)

## 2023-05-04 RX ORDER — WATER 1000 ML/1000ML
INJECTION, SOLUTION INTRAVENOUS
Status: COMPLETED
Start: 2023-05-04 | End: 2023-05-04

## 2023-05-04 RX ADMIN — ENOXAPARIN SODIUM 40 MG: 40 INJECTION SUBCUTANEOUS at 08:47

## 2023-05-04 RX ADMIN — AZITHROMYCIN MONOHYDRATE 250 MG: 250 TABLET ORAL at 20:59

## 2023-05-04 RX ADMIN — LEVALBUTEROL HYDROCHLORIDE 1.25 MG: 1.25 SOLUTION RESPIRATORY (INHALATION) at 20:00

## 2023-05-04 RX ADMIN — GUAIFENESIN 1200 MG: 600 TABLET, EXTENDED RELEASE ORAL at 08:47

## 2023-05-04 RX ADMIN — IPRATROPIUM BROMIDE 0.5 MG: 0.5 SOLUTION RESPIRATORY (INHALATION) at 07:16

## 2023-05-04 RX ADMIN — CYANOCOBALAMIN TAB 500 MCG 1000 MCG: 500 TAB at 08:48

## 2023-05-04 RX ADMIN — TAMSULOSIN HYDROCHLORIDE 0.4 MG: 0.4 CAPSULE ORAL at 16:29

## 2023-05-04 RX ADMIN — FORMOTEROL FUMARATE DIHYDRATE 20 MCG: 20 SOLUTION RESPIRATORY (INHALATION) at 20:00

## 2023-05-04 RX ADMIN — IPRATROPIUM BROMIDE 0.5 MG: 0.5 SOLUTION RESPIRATORY (INHALATION) at 20:00

## 2023-05-04 RX ADMIN — WATER: 1 INJECTION INTRAMUSCULAR; INTRAVENOUS; SUBCUTANEOUS at 09:53

## 2023-05-04 RX ADMIN — LEVALBUTEROL HYDROCHLORIDE 1.25 MG: 1.25 SOLUTION RESPIRATORY (INHALATION) at 07:16

## 2023-05-04 RX ADMIN — MELATONIN 3 MG: at 20:59

## 2023-05-04 RX ADMIN — ACETAZOLAMIDE SODIUM 250 MG: 500 INJECTION, POWDER, LYOPHILIZED, FOR SOLUTION INTRAVENOUS at 09:01

## 2023-05-04 RX ADMIN — PRAVASTATIN SODIUM 80 MG: 80 TABLET ORAL at 16:29

## 2023-05-04 RX ADMIN — BUDESONIDE 0.5 MG: 0.5 INHALANT ORAL at 07:16

## 2023-05-04 RX ADMIN — LEVALBUTEROL HYDROCHLORIDE 1.25 MG: 1.25 SOLUTION RESPIRATORY (INHALATION) at 13:11

## 2023-05-04 RX ADMIN — LEVALBUTEROL HYDROCHLORIDE 1.25 MG: 1.25 SOLUTION RESPIRATORY (INHALATION) at 01:21

## 2023-05-04 RX ADMIN — IPRATROPIUM BROMIDE 0.5 MG: 0.5 SOLUTION RESPIRATORY (INHALATION) at 13:11

## 2023-05-04 RX ADMIN — IPRATROPIUM BROMIDE 0.5 MG: 0.5 SOLUTION RESPIRATORY (INHALATION) at 01:21

## 2023-05-04 RX ADMIN — PREDNISONE 40 MG: 20 TABLET ORAL at 08:47

## 2023-05-04 RX ADMIN — BUDESONIDE 0.5 MG: 0.5 INHALANT ORAL at 20:00

## 2023-05-04 RX ADMIN — ASPIRIN 81 MG CHEWABLE TABLET 81 MG: 81 TABLET CHEWABLE at 08:48

## 2023-05-04 RX ADMIN — GUAIFENESIN 1200 MG: 600 TABLET, EXTENDED RELEASE ORAL at 20:59

## 2023-05-04 NOTE — NURSING NOTE
Patient safely ambulated from bed to stretcher with assist x1 and tolerated well  All belongings accounted for  Transport personnel transporting him to Room 805  Called spouse Janel Sousa to inform her of patients transfer

## 2023-05-04 NOTE — PROGRESS NOTES
PULMONOLOGY PROGRESS NOTE     Name: Ani Boudreaux  Age & Sex: 79 y o  male   MRN: [id number]  Unit/Bed#: [facility] 805-01   Encounter: [id number]    PATIENT INFORMATION     Name: Ani Boudreaux  Age & Sex: 79 y o  male   MRN: [id number]  Hospital Stay Days: 4    ASSESSMENT/PLAN     Assessment:   1  Acute hypoxic respiratory failure  · ABG (2023)-pH 7 281, PCO2 95 9, PO2 87, HCO3 44 2  · ABG (2023)-pH 7 391, PCO2 73 3, PO2 77 2, HCO3 43 5  · VBG (2023)-pH 7 433, PCO2 61 7, PO2 52, HCO3 40 3  2  COPD with acute exacerbation  · PFTs 2020: FEV1/FVC: 30%, FVC: 2L, 59% predicted, FEV1: 0 6L 22% predicted, %, %, DLCO 45%  3  Acute on chronic systolic heart failure  · Echo (2023)-EF 20% with severe global hypokinesis  4  KEVIN-BiPAP nightly   5  BPH-tamsulosin  6  HLD-pravastatin    Plan:  • Patient saturating at 97% on 4 L-continue to titrate to baseline, goal SPO2 greater than 90%  • Prednisone 40 taper  • Continue home Pulmicort and formoterol  • Continue Xopenex/Atrovent  • Continue azithromycin 250 daily  • Goals of care discussions ongoing with palliative  • Continue BiPAP nightly      SUBJECTIVE     Patient seen and examined  No acute events overnight  States he is getting back to his baseline shortness of breath  OBJECTIVE     Vitals:    23 2331 23 0122 23 0720 23 0804   BP: 105/60   95/50   Pulse: 81   83   Resp: 14   15   Temp: 97 5 °F (36 4 °C)   98 1 °F (36 7 °C)   TempSrc:       SpO2: 100% 100% 97% 95%   Weight:       Height:          Temperature:   Temp (24hrs), Av 9 °F (36 6 °C), Min:97 5 °F (36 4 °C), Max:98 1 °F (36 7 °C)    Temperature: 98 1 °F (36 7 °C)  Intake & Output:  I/O         0700    P  O  240      I V  (mL/kg)       IV Piggyback 250      Total Intake(mL/kg) 490 (9 9)      Urine (mL/kg/hr) 600 (0 5) 1670 (1 4)     Stool 0      Total Output 600 1670     Net -110 -1670 Unmeasured Urine Occurrence 2 x 1 x     Unmeasured Stool Occurrence 1 x          Weights:   IBW (Ideal Body Weight): 50 kg    Body mass index is 21 27 kg/m²  Weight (last 2 days)     Date/Time Weight    05/03/23 0600 49 4 (108 91)    05/02/23 0530 48 8 (107 58)        Physical Exam  Vitals reviewed  Constitutional:       Appearance: He is ill-appearing  HENT:      Mouth/Throat:      Mouth: Mucous membranes are moist       Pharynx: Oropharynx is clear  Cardiovascular:      Rate and Rhythm: Normal rate and regular rhythm  Pulmonary:      Comments: B/l diminished breath sounds  Abdominal:      General: Abdomen is flat  There is no distension  Tenderness: There is no abdominal tenderness  Musculoskeletal:      Right lower leg: No edema  Left lower leg: No edema  Neurological:      Mental Status: He is oriented to person, place, and time  Psychiatric:         Mood and Affect: Mood normal          Behavior: Behavior normal            LABORATORY DATA     Labs: I have personally reviewed pertinent reports  Results from last 7 days   Lab Units 05/04/23 0443 05/03/23  0520 05/02/23  0453   WBC Thousand/uL 6 54 6 70 4 13*   HEMOGLOBIN g/dL 10 0* 9 8* 9 9*   HEMATOCRIT % 32 8* 31 0* 31 0*   PLATELETS Thousands/uL 235 224 231   NEUTROS PCT % 72 89* 86*   MONOS PCT % 13* 5 6      Results from last 7 days   Lab Units 05/04/23  0443 05/03/23  0520 05/02/23  0453 04/30/23  2241 04/30/23  1804   POTASSIUM mmol/L 3 6 4 2 4 3   < > 4 7   CHLORIDE mmol/L 90* 89* 86*   < > 86*   CO2 mmol/L 43* 39* >45*   < > 42*   BUN mg/dL 19 22 33*   < > 37*   CREATININE mg/dL 0 56* 0 55* 0 67   < > 0 91   CALCIUM mg/dL 8 6 8 6 9 0   < > 9 7   ALK PHOS U/L  --   --  51  --  68   ALT U/L  --   --  20  --  23   AST U/L  --   --  25  --  35    < > = values in this interval not displayed                                ABG:   Results from last 7 days   Lab Units 05/01/23  1007   PH ART  7 391   PCO2 ART mm Hg 73 3*   PO2 ART mm Hg 77 2   HCO3 ART mmol/L 43 5*   BASE EXC ART mmol/L 15 5   ABG SOURCE  Brachial, Right       Micro:   Results from last 7 days   Lab Units 05/01/23  0038 05/01/23  0030 04/30/23 2234 04/30/23 2233   BLOOD CULTURE  No Growth at 72 hrs  No Growth at 72 hrs   --   --    URINE CULTURE   --   --  50,000-59,000 cfu/ml Klebsiella oxytoca*  10,000-19,000 cfu/ml  --    LEGIONELLA URINARY ANTIGEN   --   --   --  Negative   STREP PNEUMONIAE ANTIGEN, URINE   --   --   --  Negative         IMAGING & DIAGNOSTIC TESTING     Radiology Results: I have personally reviewed pertinent reports  XR chest 1 view portable    Result Date: 5/1/2023  Impression: No acute cardiopulmonary disease  Workstation performed: [id number]     Other Diagnostic Testing: I have personally reviewed pertinent reports      ACTIVE MEDICATIONS     Current Facility-Administered Medications   Medication Dose Route Frequency   • acetaZOLAMIDE (DIAMOX) injection 250 mg  250 mg Intravenous Daily   • aspirin chewable tablet 81 mg  81 mg Oral Daily   • azithromycin (ZITHROMAX) tablet 250 mg  250 mg Oral Q24H   • budesonide (PULMICORT) inhalation solution 0 5 mg  0 5 mg Nebulization Q12H   • cyanocobalamin (VITAMIN B-12) tablet 1,000 mcg  1,000 mcg Oral Daily   • enoxaparin (LOVENOX) subcutaneous injection 40 mg  40 mg Subcutaneous Daily   • ergocalciferol (VITAMIN D2) capsule 50,000 Units  50,000 Units Oral Q28 Days   • formoterol (PERFOROMIST) nebulizer solution 20 mcg  20 mcg Nebulization Q12H   • guaiFENesin (MUCINEX) 12 hr tablet 1,200 mg  1,200 mg Oral Q12H GABE   • ipratropium (ATROVENT) 0 02 % inhalation solution 0 5 mg  0 5 mg Nebulization Q6H   • levalbuterol (XOPENEX) inhalation solution 1 25 mg  1 25 mg Nebulization Q6H   • melatonin tablet 3 mg  3 mg Oral HS   • pravastatin (PRAVACHOL) tablet 80 mg  80 mg Oral Daily With Dinner   • predniSONE tablet 40 mg  40 mg Oral Daily    Followed by   • [START ON 5/7/2023] predniSONE tablet 30 mg  30 mg Oral "Daily    Followed by   • [START ON 5/10/2023] predniSONE tablet 20 mg  20 mg Oral Daily    Followed by   • [START ON 5/13/2023] predniSONE tablet 10 mg  10 mg Oral Daily    Followed by   • [START ON 5/16/2023] predniSONE tablet 5 mg  5 mg Oral Daily   • tamsulosin (FLOMAX) capsule 0 4 mg  0 4 mg Oral Daily With Dinner   • torsemide (DEMADEX) tablet 20 mg  20 mg Oral Daily       VTE Pharmacologic Prophylaxis: Enoxaparin (Lovenox)  VTE Mechanical Prophylaxis: sequential compression device      Disclaimer: Portions of the record may have been created with voice recognition software  Occasional wrong word or \"sound a like\" substitutions may have occurred due to the inherent limitations of voice recognition software  Careful consideration should be taken to recognize, using context, where substitutions have occurred      Esther Singleton MD   Internal medicine residency PGY-1  [address]     "

## 2023-05-04 NOTE — PROGRESS NOTES
[facility] [facility]  Progress Note  Name: Fuad Cruz  MRN: [id number]  Unit/Bed#: PPHP 805-01 I Date of Admission: 4/30/2023   Date of Service: 5/4/2023 I Hospital Day: 4    Assessment/Plan   * COPD exacerbation ([place] [address] )  Assessment & Plan  PFTs 2020: FEV1/FVC: 30%, FVC: 2L, 59% predicted, FEV1: 0 6L 22% predicted, %, %, DLCO 45%    Continue Xopenex/Atrovent nebs q6  Continue home pulmicort and formeterol  Solumedrol q12; continue taper and d/c home prednisone 5 mg qd  Continue BiPAP qhs  Continue azithromycin 500 mg x3 days (until 5/3/23); then back to 250 mg 2-3x per week  BiPAP qhs  Wean O2 to home 3L  SPO2>88%  Pulm consult    HLD (hyperlipidemia)  Assessment & Plan  Continue home pravastatin 80 mg    Chronic HFrEF (heart failure with reduced ejection fraction) ([facility])  Assessment & Plan  Wt Readings from Last 3 Encounters:   05/02/23 48 8 kg (107 lb 9 4 oz)   04/13/23 48 5 kg (107 lb)   03/27/23 46 3 kg (102 lb)     · Left Ventricle: Left ventricular cavity size is mildly dilated  Wall thickness is normal  The left ventricular ejection fraction is 20%  Systolic function is severely reduced  There is severe global hypokinesis with regional variation  Plan:  restart home torsemide  Add diamox 25 mg qd to help excrete bicarb as per critical care  We will follow-up with pulmonary  Continue lopressor 12 5mg bid  Add GDMT as tolerated  Cardiology following; ? AICD candidate        Protein-calorie malnutrition ([place] [address] )  Assessment & Plan  Malnutrition Findings:                                 BMI Findings: Body mass index is 21 01 kg/m²       Continue Cardiac Diet      BPH with obstruction/lower urinary tract symptoms  Assessment & Plan  Continue home flomax    Chronic respiratory failure with hypoxia and hypercapnia [facility])  Assessment & Plan  Recent Labs     05/01/23  0424 05/01/23  0550 05/02/23  0453   PHVEN 7 262* 7 285* 7 433*   [id number] 121 5* 118 2* 61 7* PO2VEN 30 0* 27 4* 52 0*   [id number] 53 5* 54 9* 40 3*     See A/P under COPD exacerbation    Mood disorder ([address] )  Assessment & Plan  Continue home Lexapro     Vitamin D deficiency  Assessment & Plan  Continue home D2       Ongoing goals of care discussion    VTE Pharmacologic Prophylaxis:   Pharmacologic: Enoxaparin (Lovenox)  Mechanical VTE Prophylaxis in Place: No    Patient Centered Rounds: I have performed bedside rounds with nursing staff today  Time Spent for Care: 15 minutes  More than 50% of total time spent on counseling and coordination of care as described above  Current Length of Stay: 4 day(s)    Current Patient Status: Inpatient     Code Status: Level 1 - Full Code      Subjective:   nad    Objective:     Vitals:   Temp (24hrs), Av 9 °F (36 6 °C), Min:97 5 °F (36 4 °C), Max:98 1 °F (36 7 °C)    Temp:  [97 5 °F (36 4 °C)-98 1 °F (36 7 °C)] 98 1 °F (36 7 °C)  HR:  [] 83  Resp:  [14-35] 15  BP: ()/(50-76) 97/60  SpO2:  [88 %-100 %] 93 %  Body mass index is 21 27 kg/m²  Input and Output Summary (last 24 hours): Intake/Output Summary (Last 24 hours) at 2023 0856  Last data filed at 5/3/2023 2143  Gross per 24 hour   Intake --   Output 1670 ml   Net -1670 ml       Physical Exam:     Physical Exam  Constitutional:       Appearance: Normal appearance  Cardiovascular:      Rate and Rhythm: Normal rate and regular rhythm  Heart sounds: No murmur heard  Pulmonary:      Effort: Pulmonary effort is normal       Breath sounds: Normal breath sounds  Abdominal:      General: Abdomen is flat  Palpations: Abdomen is soft  Musculoskeletal:         General: No swelling  Normal range of motion  Skin:     General: Skin is warm and dry  Neurological:      General: No focal deficit present  Mental Status: He is alert and oriented to person, place, and time     Psychiatric:         Mood and Affect: Mood normal          Additional Data:     Labs:    Results from last 7 days   Lab Units 05/04/23  0443   WBC Thousand/uL 6 54   HEMOGLOBIN g/dL 10 0*   HEMATOCRIT % 32 8*   PLATELETS Thousands/uL 235   NEUTROS PCT % 72   LYMPHS PCT % 14   MONOS PCT % 13*   EOS PCT % 0     Results from last 7 days   Lab Units 05/04/23  0443 05/03/23  0520 05/02/23  0453   POTASSIUM mmol/L 3 6   < > 4 3   CHLORIDE mmol/L 90*   < > 86*   CO2 mmol/L 43*   < > >45*   BUN mg/dL 19   < > 33*   CREATININE mg/dL 0 56*   < > 0 67   CALCIUM mg/dL 8 6   < > 9 0   ALK PHOS U/L  --   --  51   ALT U/L  --   --  20   AST U/L  --   --  25    < > = values in this interval not displayed  * I Have Reviewed All Lab Data Listed Above  * Additional Pertinent Lab Tests Reviewed: All Labs Within Last 24 Hours Reviewed        Recent Cultures (last 7 days):     Results from last 7 days   Lab Units 05/01/23  0038 05/01/23  0030 04/30/23 2234 04/30/23 2233   BLOOD CULTURE  No Growth at 72 hrs   No Growth at 72 hrs   --   --    URINE CULTURE   --   --  50,000-59,000 cfu/ml Klebsiella oxytoca*  10,000-19,000 cfu/ml  --    LEGIONELLA URINARY ANTIGEN   --   --   --  Negative       Last 24 Hours Medication List:   Current Facility-Administered Medications   Medication Dose Route Frequency Provider Last Rate   • acetaZOLAMIDE  250 mg Intravenous Daily Koko Bobo PA-C     • aspirin  81 mg Oral Daily Koko Bobo PA-C     • azithromycin  250 mg Oral Q24H Koko Bobo PA-C     • budesonide  0 5 mg Nebulization Q12H Koko Bobo PA-C     • cyanocobalamin  1,000 mcg Oral Daily Koko Bobo PA-C     • enoxaparin  40 mg Subcutaneous Daily Koko Bobo PA-C     • ergocalciferol  50,000 Units Oral Q28 Days Koko Bobo PA-C     • formoterol  20 mcg Nebulization Q12H Koko Bobo PA-C     • guaiFENesin  1,200 mg Oral Q12H [address] Aden Velez PA-C     • ipratropium  0 5 mg Nebulization Q6H Aden Velez PA-C     • levalbuterol  1 25 mg Nebulization Q6H Janeth Saleh PA-C     • melatonin  3 mg Oral HS Janeth Saleh PA-C     • pravastatin  80 mg Oral Daily With GIGI Mckeon     • predniSONE  40 mg Oral Daily Janeth Saleh PA-C      Followed by   • [START ON 5/7/2023] predniSONE  30 mg Oral Daily Janeth Saleh PA-C      Followed by   • [START ON 5/10/2023] predniSONE  20 mg Oral Daily Janeth Saleh PA-C      Followed by   • [START ON 5/13/2023] predniSONE  10 mg Oral Daily Janeth Saleh PA-C      Followed by   • [START ON 5/16/2023] predniSONE  5 mg Oral Daily Janeth Saleh PA-C     • sterile water          • tamsulosin  0 4 mg Oral Daily With GIGI Mckeon     • torsemide  20 mg Oral Daily Janeth Saleh PA-C          Today, Patient Was Seen By: Krish Zaragoza DO    ** Please Note: Dictation voice to text software may have been used in the creation of this document   **

## 2023-05-04 NOTE — ACP (ADVANCE CARE PLANNING)
"Advanced Care Planning Progress Note    Serious Illness Conversation    1  What is your understanding now of where you are with your illness? Prognostic Understanding: appropriate understanding of prognosis  Patient and his wife reports that they have had extensive discussions regarding his prognosis  They appear to be having difficulty coming to a conclusion on how to proceed with his care  They understand that his condition is worsening with each exacerbation and that there are limited options for treatment of his end-stage COPD and heart disease     2  How much information about what is likely to be ahead with your illness would you like to have? Information: patient wants to be informed of big picture, but not details  Patient appears frustrated when discussing his prognosis  He notes \"I have no thoughts on this\"  Wife at bedside acknowledges concern regarding potential complications  3  What did you (clinician) communicate to the patient? Prognostic Communication: Uncertain - It can be difficult to predict what will happen with your illness  I hope you will continue to live well for a long time but I’m worried that you could get sick quickly, and I think it is important to prepare for that possibility  Discussed that there is a high risk for him to be rehospitalized/treated in ICU setting  There is also a high risk that he may probably be on a ventilator for his breathing in addition to being institution dependent     4  If your health situation worsens, what are your most important goals? Patient reports that he \"has no thoughts on this\"  He currently feels frustrated about his current condition  He has been having ongoing discussion with his wife who is at bedside  5  What are the biggest fears and worries about the future and your health? Fears/Worries: loss of control     6  What abilities are so critical to your life that you cannot imagine living without them?   Unacceptable Function: " not being myself     7  What gives you strength as you think about the future with your illness? Again patient declares that he has no thoughts on this issue     8  If you become sicker, how much are you willing to go through for the possibility of gaining more time? Be in the hospital: Yes Have a feeding tube: Yes   Be in the ICU: Yes Live in a nursing home: No   Be on a ventilator: Yes    Be on dialysis: Yes Undergo aggressive test and/or procedures: Yes   Reports that that he would not want to live in a facility  However, he would want aggressive treatment in the hospital including ICU care at this particular juncture  Wife and patient report that they will discuss further goals of care including CODE STATUS  We will revisit this tomorrow  9  How much does your proxy and family know about your priorities and wishes? Discussion Discussion: extensive discussion with family about goals and wishes  Wife is part of the discussion at bedside        How does this plan sound to you? I will do everything I can to help you through this    Patient verbalized understanding of the plan     I have spent 35 minutes speaking with my patient on advanced care planning today or during this visit     Advanced directives  Five Wishes: Patient does not have Five Wishes- would not like information         Imani Liu, DO

## 2023-05-05 DIAGNOSIS — I50.23 ACUTE ON CHRONIC HFREF (HEART FAILURE WITH REDUCED EJECTION FRACTION) (HCC): ICD-10-CM

## 2023-05-05 PROBLEM — Z71.89 GOALS OF CARE, COUNSELING/DISCUSSION: Status: ACTIVE | Noted: 2021-02-25

## 2023-05-05 LAB
ANION GAP SERPL CALCULATED.3IONS-SCNC: -1 MMOL/L (ref 4–13)
BASOPHILS # BLD AUTO: 0 THOUSANDS/ÂΜL (ref 0–0.1)
BASOPHILS NFR BLD AUTO: 0 % (ref 0–1)
BUN SERPL-MCNC: 19 MG/DL (ref 5–25)
CALCIUM SERPL-MCNC: 8.9 MG/DL (ref 8.3–10.1)
CHLORIDE SERPL-SCNC: 93 MMOL/L (ref 96–108)
CO2 SERPL-SCNC: 40 MMOL/L (ref 21–32)
CREAT SERPL-MCNC: 0.54 MG/DL (ref 0.6–1.3)
EOSINOPHIL # BLD AUTO: 0 THOUSAND/ÂΜL (ref 0–0.61)
EOSINOPHIL NFR BLD AUTO: 0 % (ref 0–6)
ERYTHROCYTE [DISTWIDTH] IN BLOOD BY AUTOMATED COUNT: 12 % (ref 11.6–15.1)
GFR SERPL CREATININE-BSD FRML MDRD: 109 ML/MIN/1.73SQ M
GLUCOSE SERPL-MCNC: 91 MG/DL (ref 65–140)
HCT VFR BLD AUTO: 29.9 % (ref 36.5–49.3)
HGB BLD-MCNC: 9.5 G/DL (ref 12–17)
IMM GRANULOCYTES # BLD AUTO: 0.04 THOUSAND/UL (ref 0–0.2)
IMM GRANULOCYTES NFR BLD AUTO: 1 % (ref 0–2)
LYMPHOCYTES # BLD AUTO: 0.52 THOUSANDS/ÂΜL (ref 0.6–4.47)
LYMPHOCYTES NFR BLD AUTO: 7 % (ref 14–44)
MCH RBC QN AUTO: 31.7 PG (ref 26.8–34.3)
MCHC RBC AUTO-ENTMCNC: 31.8 G/DL (ref 31.4–37.4)
MCV RBC AUTO: 100 FL (ref 82–98)
MONOCYTES # BLD AUTO: 0.65 THOUSAND/ÂΜL (ref 0.17–1.22)
MONOCYTES NFR BLD AUTO: 9 % (ref 4–12)
NEUTROPHILS # BLD AUTO: 5.85 THOUSANDS/ÂΜL (ref 1.85–7.62)
NEUTS SEG NFR BLD AUTO: 83 % (ref 43–75)
NRBC BLD AUTO-RTO: 0 /100 WBCS
PLATELET # BLD AUTO: 213 THOUSANDS/UL (ref 149–390)
PMV BLD AUTO: 9.1 FL (ref 8.9–12.7)
POTASSIUM SERPL-SCNC: 3.5 MMOL/L (ref 3.5–5.3)
RBC # BLD AUTO: 3 MILLION/UL (ref 3.88–5.62)
SODIUM SERPL-SCNC: 132 MMOL/L (ref 135–147)
WBC # BLD AUTO: 7.06 THOUSAND/UL (ref 4.31–10.16)

## 2023-05-05 RX ORDER — LOSARTAN POTASSIUM 25 MG/1
TABLET ORAL
Qty: 45 TABLET | Refills: 2 | Status: SHIPPED | OUTPATIENT
Start: 2023-05-05

## 2023-05-05 RX ORDER — WATER 1000 ML/1000ML
INJECTION, SOLUTION INTRAVENOUS
Status: COMPLETED
Start: 2023-05-05 | End: 2023-05-05

## 2023-05-05 RX ORDER — LEVALBUTEROL INHALATION SOLUTION 1.25 MG/3ML
1.25 SOLUTION RESPIRATORY (INHALATION) EVERY 6 HOURS PRN
Status: DISCONTINUED | OUTPATIENT
Start: 2023-05-05 | End: 2023-05-10 | Stop reason: HOSPADM

## 2023-05-05 RX ADMIN — MELATONIN 3 MG: at 21:24

## 2023-05-05 RX ADMIN — IPRATROPIUM BROMIDE 0.5 MG: 0.5 SOLUTION RESPIRATORY (INHALATION) at 02:47

## 2023-05-05 RX ADMIN — LEVALBUTEROL HYDROCHLORIDE 1.25 MG: 1.25 SOLUTION RESPIRATORY (INHALATION) at 18:23

## 2023-05-05 RX ADMIN — IPRATROPIUM BROMIDE 0.5 MG: 0.5 SOLUTION RESPIRATORY (INHALATION) at 19:52

## 2023-05-05 RX ADMIN — WATER: 1 INJECTION INTRAMUSCULAR; INTRAVENOUS; SUBCUTANEOUS at 08:52

## 2023-05-05 RX ADMIN — TAMSULOSIN HYDROCHLORIDE 0.4 MG: 0.4 CAPSULE ORAL at 17:49

## 2023-05-05 RX ADMIN — GUAIFENESIN 1200 MG: 600 TABLET, EXTENDED RELEASE ORAL at 21:24

## 2023-05-05 RX ADMIN — BUDESONIDE 0.5 MG: 0.5 INHALANT ORAL at 07:19

## 2023-05-05 RX ADMIN — CYANOCOBALAMIN TAB 500 MCG 1000 MCG: 500 TAB at 08:51

## 2023-05-05 RX ADMIN — ASPIRIN 81 MG CHEWABLE TABLET 81 MG: 81 TABLET CHEWABLE at 08:51

## 2023-05-05 RX ADMIN — LEVALBUTEROL HYDROCHLORIDE 1.25 MG: 1.25 SOLUTION RESPIRATORY (INHALATION) at 07:19

## 2023-05-05 RX ADMIN — LEVALBUTEROL HYDROCHLORIDE 1.25 MG: 1.25 SOLUTION RESPIRATORY (INHALATION) at 13:53

## 2023-05-05 RX ADMIN — LEVALBUTEROL HYDROCHLORIDE 1.25 MG: 1.25 SOLUTION RESPIRATORY (INHALATION) at 19:52

## 2023-05-05 RX ADMIN — ACETAZOLAMIDE SODIUM 250 MG: 500 INJECTION, POWDER, LYOPHILIZED, FOR SOLUTION INTRAVENOUS at 08:50

## 2023-05-05 RX ADMIN — LEVALBUTEROL HYDROCHLORIDE 1.25 MG: 1.25 SOLUTION RESPIRATORY (INHALATION) at 02:47

## 2023-05-05 RX ADMIN — PREDNISONE 40 MG: 20 TABLET ORAL at 08:51

## 2023-05-05 RX ADMIN — FORMOTEROL FUMARATE DIHYDRATE 20 MCG: 20 SOLUTION RESPIRATORY (INHALATION) at 07:19

## 2023-05-05 RX ADMIN — BUDESONIDE 0.5 MG: 0.5 INHALANT ORAL at 19:52

## 2023-05-05 RX ADMIN — FORMOTEROL FUMARATE DIHYDRATE 20 MCG: 20 SOLUTION RESPIRATORY (INHALATION) at 19:52

## 2023-05-05 RX ADMIN — PRAVASTATIN SODIUM 80 MG: 80 TABLET ORAL at 17:49

## 2023-05-05 RX ADMIN — AZITHROMYCIN MONOHYDRATE 250 MG: 250 TABLET ORAL at 21:24

## 2023-05-05 RX ADMIN — IPRATROPIUM BROMIDE 0.5 MG: 0.5 SOLUTION RESPIRATORY (INHALATION) at 07:19

## 2023-05-05 RX ADMIN — GUAIFENESIN 1200 MG: 600 TABLET, EXTENDED RELEASE ORAL at 08:50

## 2023-05-05 RX ADMIN — IPRATROPIUM BROMIDE 0.5 MG: 0.5 SOLUTION RESPIRATORY (INHALATION) at 13:53

## 2023-05-05 RX ADMIN — ENOXAPARIN SODIUM 40 MG: 40 INJECTION SUBCUTANEOUS at 08:50

## 2023-05-05 NOTE — PROGRESS NOTES
[facility]  Progress Note  Name: Lidya Hinson  MRN: [id number]  Unit/Bed#: PPHP 805-01 I Date of Admission: 4/30/2023   Date of Service: 5/5/2023 I Hospital Day: 5    Assessment/Plan   * COPD exacerbation ([facility] )  Assessment & Plan  PFTs 2020: FEV1/FVC: 30%, FVC: 2L, 59% predicted, FEV1: 0 6L 22% predicted, %, %, DLCO 45%    Continue Xopenex/Atrovent nebs q6  Continue home pulmicort and formeterol  Solumedrol q12; continue taper and d/c home prednisone 5 mg qd  Continue BiPAP qhs  Continue azithromycin 500 mg x3 days (until 5/3/23); then back to 250 mg 2-3x per week  BiPAP qhs  Wean O2 to home 3L  SPO2>88%  Pulm consult noted  We will follow-up with family regarding ongoing goals of care    HLD (hyperlipidemia)  Assessment & Plan  Continue home pravastatin 80 mg    Chronic HFrEF (heart failure with reduced ejection fraction) ([facility])  Assessment & Plan  Wt Readings from Last 3 Encounters:   05/05/23 44 9 kg (99 lb)   04/13/23 48 5 kg (107 lb)   03/27/23 46 3 kg (102 lb)     · Left Ventricle: Left ventricular cavity size is mildly dilated  Wall thickness is normal  The left ventricular ejection fraction is 20%  Systolic function is severely reduced  There is severe global hypokinesis with regional variation  Plan:  restart home torsemide  Add diamox 25 mg qd to help excrete bicarb as per critical care  We will follow-up with pulmonary  Continue lopressor 12 5mg bid  Add GDMT as tolerated  Cardiology following; ? AICD candidate        Protein-calorie malnutrition ([place] [address] )  Assessment & Plan  Malnutrition Findings:   Adult Malnutrition type: Chronic illness  Adult Degree of Malnutrition: Malnutrition of moderate degree  Malnutrition Characteristics: Muscle loss, Inadequate energy, Fluid accumulation                  360 Statement: Malnutrition of moderate degree in kari cointext of chronic illness related to SOB causing decreased intake as evidenced by severe depletion of muscle at clavicles, and mild depeption at [employer]  Treated with nutritional supplements    BMI Findings: Body mass index is 19 33 kg/m²  Continue Cardiac Diet      BPH with obstruction/lower urinary tract symptoms  Assessment & Plan  Continue home flomax    Goals of care, counseling/discussion  Assessment & Plan  Patient is a level 3 DNR  For now he wishes to pursue aggressive medical management which would include pulmonary toilet bronchodilator therapy steroids BiPAP oxygen therapy and hospitalizations  Discussed with patient and his primary pulmonologist at bedside Dr Berta Olson  We will try to facilitate outpatient follow-up with palliative care  Wife asking for case management support for finances  Will discuss with case management  Chronic respiratory failure with hypoxia and hypercapnia ([facility])  Assessment & Plan  No results for input(s): Idalia Mccarty in the last 72 hours  See A/P under COPD exacerbation    Mood disorder ([facility] )  Assessment & Plan  Continue home Lexapro          VTE Pharmacologic Prophylaxis:   Pharmacologic: Enoxaparin (Lovenox)  Mechanical VTE Prophylaxis in Place: No    Patient Centered Rounds: I have performed bedside rounds with nursing staff today  Time Spent for Care: 15 minutes  More than 50% of total time spent on counseling and coordination of care as described above  Current Length of Stay: 5 day(s)    Current Patient Status: Inpatient       Code Status: Level 3 - DNAR and DNI      Subjective:   No acute distress    Objective:     Vitals:   Temp (24hrs), Av 6 °F (36 4 °C), Min:97 6 °F (36 4 °C), Max:97 6 °F (36 4 °C)    Temp:  [97 6 °F (36 4 °C)] 97 6 °F (36 4 °C)  HR:  [80-95] 88  Resp:  [14-18] 18  BP: ()/(54-61) 94/54  SpO2:  [92 %-98 %] 94 %  Body mass index is 19 33 kg/m²  Input and Output Summary (last 24 hours):        Intake/Output Summary (Last 24 hours) at 2023 1542  Last data filed at 2023 1253  Gross per 24 hour   Intake 380 ml   Output 1345 ml   Net -965 ml       Physical Exam:     Physical Exam    Additional Data:     Labs:    Results from last 7 days   Lab Units 05/05/23  0503   WBC Thousand/uL 7 06   HEMOGLOBIN g/dL 9 5*   HEMATOCRIT % 29 9*   PLATELETS Thousands/uL 213   NEUTROS PCT % 83*   LYMPHS PCT % 7*   MONOS PCT % 9   EOS PCT % 0     Results from last 7 days   Lab Units 05/05/23  0503 05/03/23  0520 05/02/23  0453   POTASSIUM mmol/L 3 5   < > 4 3   CHLORIDE mmol/L 93*   < > 86*   CO2 mmol/L 40*   < > >45*   BUN mg/dL 19   < > 33*   CREATININE mg/dL 0 54*   < > 0 67   CALCIUM mg/dL 8 9   < > 9 0   ALK PHOS U/L  --   --  51   ALT U/L  --   --  20   AST U/L  --   --  25    < > = values in this interval not displayed  * I Have Reviewed All Lab Data Listed Above  * Additional Pertinent Lab Tests Reviewed: All Labs Within Last 24 Hours Reviewed      Recent Cultures (last 7 days):     Results from last 7 days   Lab Units 05/01/23  0038 05/01/23  0030 04/30/23  2234 04/30/23  2233   BLOOD CULTURE  No Growth After 4 Days  No Growth After 4 Days    --   --    URINE CULTURE   --   --  50,000-59,000 cfu/ml Klebsiella oxytoca*  10,000-19,000 cfu/ml  --    LEGIONELLA URINARY ANTIGEN   --   --   --  Negative       Last 24 Hours Medication List:   Current Facility-Administered Medications   Medication Dose Route Frequency Provider Last Rate   • acetaZOLAMIDE  250 mg Intravenous Daily Naomi GIGI Izaguirre     • aspirin  81 mg Oral Daily Naomi GIGI Izaguirre     • azithromycin  250 mg Oral Q24H Naomi GIGI Izaguirre     • budesonide  0 5 mg Nebulization Q12H Naomi GIGI Izaguirre     • cyanocobalamin  1,000 mcg Oral Daily Naomi GIGI Izaguirre     • enoxaparin  40 mg Subcutaneous Daily Naomi Zina PAKRISTYN     • ergocalciferol  50,000 Units Oral Q28 Days Naomi GIGI Izaguirre     • formoterol  20 mcg Nebulization Q12H Naomi GIGI Izaguirre     • guaiFENesin  1,200 mg Oral Q12H [facility] Balbina Erickson PA-C     • ipratropium  0 5 mg Nebulization Q6H Balbina Erickson PA-C     • levalbuterol  1 25 mg Nebulization Q6H Balbina Erickson PA-C     • melatonin  3 mg Oral HS Balbina Erickson PA-C     • pravastatin  80 mg Oral Daily With GIGI Mckeon     • predniSONE  40 mg Oral Daily Balbina Erickson PA-C      Followed by   • [START ON 5/7/2023] predniSONE  30 mg Oral Daily Balbina Erickson PA-C      Followed by   • [START ON 5/10/2023] predniSONE  20 mg Oral Daily Balbina Erickson PA-C      Followed by   • [START ON 5/13/2023] predniSONE  10 mg Oral Daily Balbina Erickson PA-C      Followed by   • [START ON 5/16/2023] predniSONE  5 mg Oral Daily Balbina Erickson PA-C     • tamsulosin  0 4 mg Oral Daily With GIGI Mckeon     • torsemide  20 mg Oral Daily Balbina Erickson PA-C          Today, Patient Was Seen By: Herminia Pemberton DO    ** Please Note: Dictation voice to text software may have been used in the creation of this document   **

## 2023-05-05 NOTE — ASSESSMENT & PLAN NOTE
No results for input(s): Rayma Brittle, Gaile Keeler in the last 72 hours    See A/P under COPD exacerbation

## 2023-05-05 NOTE — ASSESSMENT & PLAN NOTE
Wt Readings from Last 3 Encounters:   05/05/23 44 9 kg (99 lb)   04/13/23 48 5 kg (107 lb)   03/27/23 46 3 kg (102 lb)     · Left Ventricle: Left ventricular cavity size is mildly dilated  Wall thickness is normal  The left ventricular ejection fraction is 20%  Systolic function is severely reduced  There is severe global hypokinesis with regional variation  Plan:  restart home torsemide  Add diamox 25 mg qd to help excrete bicarb as per critical care  We will follow-up with pulmonary  Continue lopressor 12 5mg bid  Add GDMT as tolerated  Cardiology following; ? AICD candidate

## 2023-05-05 NOTE — DISCHARGE INSTR - OTHER ORDERS
Skin Care Plan:  1-Cleanse sacro-buttocks with soap and water  Apply Sacral Allevyn Foam Dressing to B/l Sacro-Buttocks  Nicolás with T for Treatment and change every other day or PRN  2-Turn/reposition q2h or when medically stable for pressure re-distribution on skin   3-Elevate heels to offload pressure  4-Moisturize skin daily with skin nourishing cream  5-Ehob cushion in chair when out of bed  6-Preventative Hydraguard to bilateral heels BID and PRN

## 2023-05-05 NOTE — ASSESSMENT & PLAN NOTE
Patient is a level 3 DNR  For now he wishes to pursue aggressive medical management which would include pulmonary toilet bronchodilator therapy steroids BiPAP oxygen therapy and hospitalizations  Discussed with patient and his primary pulmonologist at bedside Dr Donato Shepherd  We will try to facilitate outpatient follow-up with palliative care  Wife asking for case management support for finances  Will discuss with case management

## 2023-05-05 NOTE — ASSESSMENT & PLAN NOTE
Malnutrition Findings:   Adult Malnutrition type: Chronic illness  Adult Degree of Malnutrition: Malnutrition of moderate degree  Malnutrition Characteristics: Muscle loss, Inadequate energy, Fluid accumulation                  360 Statement: Malnutrition of moderate degree in kari cointext of chronic illness related to SOB causing decreased intake as evidenced by severe depletion of muscle at clavicles, and mild depeption at [employer]  Treated with nutritional supplements    BMI Findings: Body mass index is 19 33 kg/m²       Continue Cardiac Diet

## 2023-05-05 NOTE — PLAN OF CARE
Problem: MOBILITY - ADULT  Goal: Maintain or return to baseline ADL function  Description: INTERVENTIONS:  -  Assess patient's ability to carry out ADLs; assess patient's baseline for ADL function and identify physical deficits which impact ability to perform ADLs (bathing, care of mouth/teeth, toileting, grooming, dressing, etc )  - Assess/evaluate cause of self-care deficits   - Assess range of motion  - Assess patient's mobility; develop plan if impaired  - Assess patient's need for assistive devices and provide as appropriate  - Encourage maximum independence but intervene and supervise when necessary  - Involve family in performance of ADLs  - Assess for home care needs following discharge   - Consider OT consult to assist with ADL evaluation and planning for discharge  - Provide patient education as appropriate  Outcome: Progressing  Goal: Maintains/Returns to pre admission functional level  Description: INTERVENTIONS:  - Perform BMAT or MOVE assessment daily    - Set and communicate daily mobility goal to care team and patient/family/caregiver     - Collaborate with rehabilitation services on mobility goals if consulted  - Patient out of bed for bathroom  - Limited by severe dyspnea  - Allowing turning repositioning with pillows  - Out of bed for toileting  - Record patient progress and toleration of activity level   Outcome: Progressing     Problem: Prexisting or High Potential for Compromised Skin Integrity  Goal: Skin integrity is maintained or improved  Description: INTERVENTIONS:  - Identify patients at risk for skin breakdown  - Assess and monitor skin integrity  - Assess and monitor nutrition and hydration status  - Monitor labs   - Assess for incontinence   - Turn and reposition patient  - Assist with mobility/ambulation  - Relieve pressure over bony prominences  - Avoid friction and shearing  - Provide appropriate hygiene as needed including keeping skin clean and dry  - Evaluate need for skin moisturizer/barrier cream  - Collaborate with interdisciplinary team   - Patient/family teaching  - Consider wound care consult   Outcome: Progressing     Problem: Nutrition/Hydration-ADULT  Goal: Nutrient/Hydration intake appropriate for improving, restoring or maintaining nutritional needs  Description: Monitor and assess patient's nutrition/hydration status for malnutrition  Collaborate with interdisciplinary team and initiate plan and interventions as ordered  Monitor patient's weight and dietary intake as ordered or per policy  Utilize nutrition screening tool and intervene as necessary  Determine patient's food preferences and provide high-protein, high-caloric foods as appropriate       INTERVENTIONS:  - Monitor oral intake, urinary output, labs, and treatment plans  - Assess nutrition and hydration status and recommend course of action  - Evaluate amount of meals eaten  - Assist patient with eating if necessary   - Allow adequate time for meals  - Recommend/ encourage appropriate diets, oral nutritional supplements, and vitamin/mineral supplements  - Order, calculate, and assess calorie counts as needed  - Recommend, monitor, and adjust tube feedings and TPN/PPN based on assessed needs  - Assess need for intravenous fluids  - Provide specific nutrition/hydration education as appropriate  - Include patient/family/caregiver in decisions related to nutrition  Outcome: Progressing

## 2023-05-05 NOTE — PROGRESS NOTES
"Progress Note - Pulmonary   Praful Cunningham  79 y o  male MRN: [id number]  Unit/Bed#: [facility] 805-01 Encounter: [id number]      Assessment  1  Acute/chronic hypoxic and hypercarbic respiratory failure  2  Severe End Stage COPD - GOLD Stage IV RISK D with acute exacerbation  3  Acute rhinovirus infection  4  Acute/chronic systolic CHF  5  Pulmonary cachexia and deconditioning     Recommendations  • Titrate O2 to keep SpO2 88-94%, continue nocturnal BiPAP  • Continue azithro for now  • Slow prednisone wean as listed - 10mg every week until at baseline 5mg daily  • Continue nebulized only bronchodilator regimen  • Trend serum bicarb, continue diamox therapy until bicarb <40  • 5/4/2023 - Discussed advanced directives with patient and his wife along with Dr Hogan Speaker - we reviewed the continued progression of his end stage diseases (COPD and CHF)  We discussed likelihood he will continue to have exacerbations of these requiring critical care life support and possibly mechanical ventilation  We discussed in event of cardiac arrest CPR would likely create more debilitating issues (rib fx, PTX) and likely make vent liberation impossible and further IMV episodes would also increase likelihood of inability to liberate from vent  We discussed further aggressive measures vs allowing for natural death  When asked his thoughts on these issues he reported \"I don't have any thoughts on it\"  I encouraged his wife and him continue to discuss these end of life issues  • 5/5/2023 - Dr Dilshad Davidson and I discussed his goals of care with Mr Jocelyne Wilkinson  He reported he did not desire CPR or invasive mechanical ventilation and that he is \"tired of fighting\" and would want to be comfortable should he have further clinical decline  Dr Sabas Mancia then discussed this with his wife and agreement to adjust to Level III DNAR as he would not want resuscitation efforts that would preclude him from going home    Further plans with HPM and potential " transition to hospice ongoing  Subjective:   79year old man with severe end stage COPD (FEV1 22% with severe hyperinflation), chronic hypoxic and hypercarbic respiratory failure on BiPAP and 3L/min NC, chronic systolic CHF, pulmonary cachexia presented for increased dyspnea and WOB  Required BiPAP support and ICU admission, found to be rhinovirus (+)  Also with worsened EF to 20% previously 30%     24hrs - feels slightly better today, has not been out of bed, scant sputum production, no pleurisy, no fevers       Objective:     Vitals: Blood pressure 94/54, pulse 88, temperature 97 6 °F (36 4 °C), resp  rate 18, height 5' (1 524 m), weight 44 9 kg (99 lb), SpO2 94 %  , 3LNC, Body mass index is 19 33 kg/m²  Intake/Output Summary (Last 24 hours) at 5/5/2023 1543  Last data filed at 5/5/2023 1253  Gross per 24 hour   Intake 380 ml   Output 1345 ml   Net -965 ml         Physical Exam  Gen: thin frail man in bed, awake, alert, oriented x 3, no acute distress but with chronic pursed lip breathing and mild tachypnea  HEENT: Mucous membranes moist, no oral lesions, no thrush, temporal wasting  NECK: No accessory muscle use, JVP not elevated, no accessory muscle use  Cardiac: Regular, single S1, single S2, no murmurs, no rubs, no gallops  Lungs: diminished BS with scattered expiratory wheeze b/l with prolonged expiratory phase, barrel chested  Abdomen: normoactive bowel sounds, soft nontender, nondistended, no rebound or rigidity, no guarding  Extremities: no cyanosis, no clubbing, no edema, poor muscle mass    Labs: I have personally reviewed pertinent lab results    Laboratory and Diagnostics  Results from last 7 days   Lab Units 05/05/23  0503 05/04/23  0443 05/03/23  0520 05/02/23  0453 05/01/23  0424 04/30/23  1804   WBC Thousand/uL 7 06 6 54 6 70 4 13* 2 94* 7 16   HEMOGLOBIN g/dL 9 5* 10 0* 9 8* 9 9* 11 3* 12 1   HEMATOCRIT % 29 9* 32 8* 31 0* 31 0* 38 4 39 5   PLATELETS Thousands/uL 213 235 224 231 223 269 NEUTROS PCT % 83* 72 89* 86* 89* 69   MONOS PCT % 9 13* 5 6 1* 14*     Results from last 7 days   Lab Units 05/05/23  0503 05/04/23  0443 05/03/23  0520 05/02/23  0453 05/01/23  0430 04/30/23  2241 04/30/23  1804   SODIUM mmol/L 132* 133* 130* 130* 132* 132* 129*   POTASSIUM mmol/L 3 5 3 6 4 2 4 3 4 6 4 3 4 7   CHLORIDE mmol/L 93* 90* 89* 86* 87* 86* 86*   CO2 mmol/L 40* 43* 39* >45* >45* >45* 42*   ANION GAP mmol/L -1* 0* 2*  --   --   --  1*   BUN mg/dL 19 19 22 33* 34* 37* 37*   CREATININE mg/dL 0 54* 0 56* 0 55* 0 67 0 79 0 72 0 91   CALCIUM mg/dL 8 9 8 6 8 6 9 0 9 4 9 5 9 7   GLUCOSE RANDOM mg/dL 91 77 201* 155* 145* 122 152*   ALT U/L  --   --   --  20  --   --  23   AST U/L  --   --   --  25  --   --  35   ALK PHOS U/L  --   --   --  51  --   --  68   ALBUMIN g/dL  --   --   --  2 8*  --   --  3 3*   TOTAL BILIRUBIN mg/dL  --   --   --  0 49  --   --  0 44                                       Results from last 7 days   Lab Units 05/02/23 0453 04/30/23  1804   PROCALCITONIN ng/ml 0 05 0 10       ABG:   Results from last 7 days   Lab Units 05/01/23  1007   PH ART  7 391   PCO2 ART mm Hg 73 3*   PO2 ART mm Hg 77 2   HCO3 ART mmol/L 43 5*   BASE EXC ART mmol/L 15 5   ABG SOURCE  Brachial, Right       MICRO  RP2 (+) rhinovirus  Strep/legionella ag neg  Bld Cx NGTD     Radiographs personally reviewed  CXR 4/30 - hyperinflation, increased vascular congestion, no dense infiltrates, no PTX      Diego Maxwell DO, [facility]

## 2023-05-05 NOTE — ACP (ADVANCE CARE PLANNING)
"Advanced Care Planning Progress Note    Serious Illness Conversation    1  What is your understanding now of where you are with your illness? Prognostic Understanding: appropriate understanding of prognosis  Patient and his wife reports that they have had extensive discussions regarding his prognosis  They appear to be having difficulty coming to a conclusion on how to proceed with his care  They understand that his condition is worsening with each exacerbation and that there are limited options for treatment of his end-stage COPD and heart disease     2  How much information about what is likely to be ahead with your illness would you like to have? Information: patient wants to be informed of big picture, but not details  Patient reports that he wishes to go home  He reports he that he is \"tired\"  He reports that he has been frustrated by his long-term illness  3  What did you (clinician) communicate to the patient? Prognostic Communication: Uncertain - It can be difficult to predict what will happen with your illness  I hope you will continue to live well for a long time but I’m worried that you could get sick quickly, and I think it is important to prepare for that possibility  Discussed that there is a high risk for him to be rehospitalized/treated in ICU setting  There is also a high risk that he may probably be on a ventilator for his breathing in addition to being institution dependent  He is agreeable to focus on aggressive medical management including BiPAP but will have limits to his care as a level 3 DNR  4  If your health situation worsens, what are your most important goals? Goals: be at home  At this particular juncture and patient would prefer to have limits to his care that would include him being a level 3 DNR  He does not wish to have aggressive medical management with the pulmonary service including oxygen BiPAP steroids bronchodilator therapy    However if he were to decline, " he would prefer to be kept comfortable at that particular juncture  For now he will be listed as a level 3 DNR as above  Plan will be to return home once respiratory status has been optimized  He we will continue aggressive medical treatment which would include rehospitalizations  5  What are the biggest fears and worries about the future and your health? Fears/Worries: loss of control     6  What abilities are so critical to your life that you cannot imagine living without them? Unacceptable Function: not being myself     7  What gives you strength as you think about the future with your illness? 8  If you become sicker, how much are you willing to go through for the possibility of gaining more time? Be in the hospital: Yes Have a feeding tube: no   Be in the ICU: no Live in a nursing home: No   Be on a ventilator:no    Be on dialysis:no Undergo aggressive test and/or procedures: Yes   Reports that that he would not want to live in a facility  In follow-up, patient would like to be a level 3 DNR  9  How much does your proxy and family know about your priorities and wishes? Discussion Discussion: extensive discussion with family about goals and wishes  Wife is part of the discussion at bedside        How does this plan sound to you? I will do everything I can to help you through this    Patient verbalized understanding of the plan     I have spent a 35 minutes speaking with my patient on advanced care planning today or during this visit     Advanced directives  Five Wishes: Patient does not have Five Wishes- would not like information         Imani Tang, DO

## 2023-05-05 NOTE — ASSESSMENT & PLAN NOTE
PFTs 2020: FEV1/FVC: 30%, FVC: 2L, 59% predicted, FEV1: 0 6L 22% predicted, %, %, DLCO 45%    Continue Xopenex/Atrovent nebs q6  Continue home pulmicort and formeterol  Solumedrol q12; continue taper and d/c home prednisone 5 mg qd  Continue BiPAP qhs  Continue azithromycin 500 mg x3 days (until 5/3/23); then back to 250 mg 2-3x per week  BiPAP qhs  Wean O2 to home 3L  SPO2>88%  Pulm consult noted    We will follow-up with family regarding ongoing goals of care

## 2023-05-05 NOTE — PLAN OF CARE
Problem: MOBILITY - ADULT  Goal: Maintain or return to baseline ADL function  Description: INTERVENTIONS:  -  Assess patient's ability to carry out ADLs; assess patient's baseline for ADL function and identify physical deficits which impact ability to perform ADLs (bathing, care of mouth/teeth, toileting, grooming, dressing, etc )  - Assess/evaluate cause of self-care deficits   - Assess range of motion  - Assess patient's mobility; develop plan if impaired  - Assess patient's need for assistive devices and provide as appropriate  - Encourage maximum independence but intervene and supervise when necessary  - Involve family in performance of ADLs  - Assess for home care needs following discharge   - Consider OT consult to assist with ADL evaluation and planning for discharge  - Provide patient education as appropriate  Outcome: Progressing  Goal: Maintains/Returns to pre admission functional level  Description: INTERVENTIONS:  - Perform BMAT or MOVE assessment daily    - Set and communicate daily mobility goal to care team and patient/family/caregiver  - Collaborate with rehabilitation services on mobility goals if consulted  - Perform Range of Motion 3 times a day  - Reposition patient every 2 hours    - Dangle patient 3 times a day  - Stand patient 3 times a day  - Ambulate patient 3 times a day  - Out of bed to chair 3 times a day   - Out of bed for meals 3 times a day  - Out of bed for toileting  - Record patient progress and toleration of activity level   Outcome: Progressing     Problem: Prexisting or High Potential for Compromised Skin Integrity  Goal: Skin integrity is maintained or improved  Description: INTERVENTIONS:  - Identify patients at risk for skin breakdown  - Assess and monitor skin integrity  - Assess and monitor nutrition and hydration status  - Monitor labs   - Assess for incontinence   - Turn and reposition patient  - Assist with mobility/ambulation  - Relieve pressure over bony prominences  - Avoid friction and shearing  - Provide appropriate hygiene as needed including keeping skin clean and dry  - Evaluate need for skin moisturizer/barrier cream  - Collaborate with interdisciplinary team   - Patient/family teaching  - Consider wound care consult   Outcome: Progressing     Problem: Nutrition/Hydration-ADULT  Goal: Nutrient/Hydration intake appropriate for improving, restoring or maintaining nutritional needs  Description: Monitor and assess patient's nutrition/hydration status for malnutrition  Collaborate with interdisciplinary team and initiate plan and interventions as ordered  Monitor patient's weight and dietary intake as ordered or per policy  Utilize nutrition screening tool and intervene as necessary  Determine patient's food preferences and provide high-protein, high-caloric foods as appropriate       INTERVENTIONS:  - Monitor oral intake, urinary output, labs, and treatment plans  - Assess nutrition and hydration status and recommend course of action  - Evaluate amount of meals eaten  - Assist patient with eating if necessary   - Allow adequate time for meals  - Recommend/ encourage appropriate diets, oral nutritional supplements, and vitamin/mineral supplements  - Order, calculate, and assess calorie counts as needed  - Recommend, monitor, and adjust tube feedings and TPN/PPN based on assessed needs  - Assess need for intravenous fluids  - Provide specific nutrition/hydration education as appropriate  - Include patient/family/caregiver in decisions related to nutrition  Outcome: Progressing

## 2023-05-05 NOTE — WOUND OSTOMY CARE
Consult Note - Wound   Mickiel Octavio  79 y o  male MRN: [id number]  Unit/Bed#: [facility] 805-01 Encounter: [id number]        History and Present Illness:  Patient is a 80 yo male that was admitted to [facility] for treatment of COPD exacerbation  Patient has a PMH of COPD and HF with EF 30%  Patient is a moderate assist for turning and repositioning  Patient is incontinent at times of bowel and bladder  Patient is bony and frail on appearance  On assessment, patient is lying on regular mattress  Wound Care was consulted for sacrum wound  Assessment Findings:   B/L heels are dry intact and hernán with no skin loss or wounds present  Recommend preventative Hydraguard Cream and proper offloading/ repositioning  1  HA Right Lower Buttocks Stage 2 Pressure Injury: Wound bed is circular in shape  Patient stated that his buttocks began to feel sore after sitting on commode for extended period of time  Partial thickness skin loss  Wound bed is non-blanchable erythema  Dilcia-wound is hyperpigmented and blanches  No drainage noted  Due to bony sacral prominence, will recommend allevyn foam to area  No induration, fluctuance, odor, warmth/temperature differences, redness, or purulence noted to the above noted wounds and skin areas assessed  New dressings applied per orders listed below  Patient tolerated well- no s/s of non-verbal pain or discomfort observed during the encounter  Bedside nurse aware of plan of care  See flow sheets for more detailed assessment findings  Orders listed below and wound care will continue to follow, call or tiger text with questions  Skin Care Plan:  1-Cleanse sacro-buttocks with soap and water  Apply Sacral Allevyn Foam Dressing to B/l Sacro-Buttocks  Nicolás with T for Treatment and change every other day or PRN  2-Turn/reposition q2h or when medically stable for pressure re-distribution on skin   3-Elevate heels to offload pressure    4-Moisturize skin daily with skin nourishing cream  5-Ehob cushion in chair when out of bed  6-Preventative Hydraguard to bilateral heels BID and PRN  Wounds:  Wound 05/04/23 Pressure Injury Buttocks Lower;Right (Active)   Wound Image   05/05/23 0841   Wound Description Non-blanchable erythema 05/05/23 0841   Pressure Injury Stage 2 05/05/23 0841   Dilcia-wound Assessment Hyperpigmented 05/05/23 0841   Wound Length (cm) 0 6 cm 05/05/23 0841   Wound Width (cm) 0 6 cm 05/05/23 0841   Wound Depth (cm) 0 1 cm 05/05/23 0841   Wound Surface Area (cm^2) 0 36 cm^2 05/05/23 0841   Wound Volume (cm^3) 0 036 cm^3 05/05/23 0841   Calculated Wound Volume (cm^3) 0 04 cm^3 05/05/23 0841   Drainage Amount None 05/05/23 0841   Non-staged Wound Description Partial thickness 05/05/23 0841   Treatments Cleansed;Site care 05/05/23 0841   Dressing Foam, Silicon (eg  Allevyn, etc) 05/05/23 0841   Dressing Changed New 05/05/23 0841   Patient Tolerance Tolerated well 05/05/23 0841   Dressing Status Clean; Intact;Dry 05/05/23 1533               Jos Park RN, BSN

## 2023-05-06 ENCOUNTER — APPOINTMENT (INPATIENT)
Dept: RADIOLOGY | Facility: HOSPITAL | Age: 68
End: 2023-05-06

## 2023-05-06 LAB
BACTERIA BLD CULT: NORMAL
BACTERIA BLD CULT: NORMAL

## 2023-05-06 RX ORDER — FUROSEMIDE 10 MG/ML
20 INJECTION INTRAMUSCULAR; INTRAVENOUS ONCE
Status: COMPLETED | OUTPATIENT
Start: 2023-05-06 | End: 2023-05-06

## 2023-05-06 RX ORDER — METHYLPREDNISOLONE SODIUM SUCCINATE 40 MG/ML
40 INJECTION, POWDER, LYOPHILIZED, FOR SOLUTION INTRAMUSCULAR; INTRAVENOUS EVERY 8 HOURS SCHEDULED
Status: DISCONTINUED | OUTPATIENT
Start: 2023-05-06 | End: 2023-05-08

## 2023-05-06 RX ADMIN — FORMOTEROL FUMARATE DIHYDRATE 20 MCG: 20 SOLUTION RESPIRATORY (INHALATION) at 07:27

## 2023-05-06 RX ADMIN — IPRATROPIUM BROMIDE 0.5 MG: 0.5 SOLUTION RESPIRATORY (INHALATION) at 13:21

## 2023-05-06 RX ADMIN — LEVALBUTEROL HYDROCHLORIDE 1.25 MG: 1.25 SOLUTION RESPIRATORY (INHALATION) at 13:21

## 2023-05-06 RX ADMIN — PRAVASTATIN SODIUM 80 MG: 80 TABLET ORAL at 18:03

## 2023-05-06 RX ADMIN — BUDESONIDE 0.5 MG: 0.5 INHALANT ORAL at 07:26

## 2023-05-06 RX ADMIN — METHYLPREDNISOLONE SODIUM SUCCINATE 40 MG: 40 INJECTION, POWDER, FOR SOLUTION INTRAMUSCULAR; INTRAVENOUS at 21:23

## 2023-05-06 RX ADMIN — METHYLPREDNISOLONE SODIUM SUCCINATE 40 MG: 40 INJECTION, POWDER, FOR SOLUTION INTRAMUSCULAR; INTRAVENOUS at 13:54

## 2023-05-06 RX ADMIN — FORMOTEROL FUMARATE DIHYDRATE 20 MCG: 20 SOLUTION RESPIRATORY (INHALATION) at 20:01

## 2023-05-06 RX ADMIN — LEVALBUTEROL HYDROCHLORIDE 1.25 MG: 1.25 SOLUTION RESPIRATORY (INHALATION) at 20:01

## 2023-05-06 RX ADMIN — GUAIFENESIN 1200 MG: 600 TABLET, EXTENDED RELEASE ORAL at 21:23

## 2023-05-06 RX ADMIN — CYANOCOBALAMIN TAB 500 MCG 1000 MCG: 500 TAB at 08:16

## 2023-05-06 RX ADMIN — BUDESONIDE 0.5 MG: 0.5 INHALANT ORAL at 20:01

## 2023-05-06 RX ADMIN — LEVALBUTEROL HYDROCHLORIDE 1.25 MG: 1.25 SOLUTION RESPIRATORY (INHALATION) at 07:26

## 2023-05-06 RX ADMIN — AZITHROMYCIN MONOHYDRATE 250 MG: 250 TABLET ORAL at 21:23

## 2023-05-06 RX ADMIN — GUAIFENESIN 1200 MG: 600 TABLET, EXTENDED RELEASE ORAL at 08:16

## 2023-05-06 RX ADMIN — FUROSEMIDE 20 MG: 10 INJECTION, SOLUTION INTRAMUSCULAR; INTRAVENOUS at 12:30

## 2023-05-06 RX ADMIN — MELATONIN 3 MG: at 21:23

## 2023-05-06 RX ADMIN — ASPIRIN 81 MG CHEWABLE TABLET 81 MG: 81 TABLET CHEWABLE at 08:15

## 2023-05-06 RX ADMIN — IPRATROPIUM BROMIDE 0.5 MG: 0.5 SOLUTION RESPIRATORY (INHALATION) at 07:27

## 2023-05-06 RX ADMIN — LEVALBUTEROL HYDROCHLORIDE 1.25 MG: 1.25 SOLUTION RESPIRATORY (INHALATION) at 02:18

## 2023-05-06 RX ADMIN — PREDNISONE 40 MG: 20 TABLET ORAL at 08:16

## 2023-05-06 RX ADMIN — IPRATROPIUM BROMIDE 0.5 MG: 0.5 SOLUTION RESPIRATORY (INHALATION) at 02:18

## 2023-05-06 RX ADMIN — ACETAZOLAMIDE SODIUM 250 MG: 500 INJECTION, POWDER, LYOPHILIZED, FOR SOLUTION INTRAVENOUS at 08:24

## 2023-05-06 RX ADMIN — IPRATROPIUM BROMIDE 0.5 MG: 0.5 SOLUTION RESPIRATORY (INHALATION) at 20:01

## 2023-05-06 RX ADMIN — TAMSULOSIN HYDROCHLORIDE 0.4 MG: 0.4 CAPSULE ORAL at 18:03

## 2023-05-06 RX ADMIN — ENOXAPARIN SODIUM 40 MG: 40 INJECTION SUBCUTANEOUS at 08:15

## 2023-05-06 NOTE — ASSESSMENT & PLAN NOTE
Malnutrition Findings:   Adult Malnutrition type: Chronic illness  Adult Degree of Malnutrition: Malnutrition of moderate degree  Malnutrition Characteristics: Muscle loss, Inadequate energy, Fluid accumulation                  360 Statement: Malnutrition of moderate degree in kari cointext of chronic illness related to SOB causing decreased intake as evidenced by severe depletion of muscle at clavicles, and mild depeption at [employer]  Treated with nutritional supplements    BMI Findings: Body mass index is 19 33 kg/m²       Continue Cardiac Diet

## 2023-05-06 NOTE — ASSESSMENT & PLAN NOTE
Wt Readings from Last 3 Encounters:   05/05/23 44 9 kg (99 lb)   04/13/23 48 5 kg (107 lb)   03/27/23 46 3 kg (102 lb)     · Left Ventricle: Left ventricular cavity size is mildly dilated  Wall thickness is normal  The left ventricular ejection fraction is 20%  Systolic function is severely reduced  There is severe global hypokinesis with regional variation  Plan:  restart home torsemide   diamox 25 mg qd to help excrete bicarb as per critical care  We will follow-up with pulmonary  Continue lopressor 12 5mg bid  Add GDMT as tolerated  Cardiology following; ? AICD candidate

## 2023-05-06 NOTE — PLAN OF CARE
Problem: MOBILITY - ADULT  Goal: Maintain or return to baseline ADL function  Description: INTERVENTIONS:  -  Assess patient's ability to carry out ADLs; assess patient's baseline for ADL function and identify physical deficits which impact ability to perform ADLs (bathing, care of mouth/teeth, toileting, grooming, dressing, etc )  - Assess/evaluate cause of self-care deficits   - Assess range of motion  - Assess patient's mobility; develop plan if impaired  - Assess patient's need for assistive devices and provide as appropriate  - Encourage maximum independence but intervene and supervise when necessary  - Involve family in performance of ADLs  - Assess for home care needs following discharge   - Consider OT consult to assist with ADL evaluation and planning for discharge  - Provide patient education as appropriate  Outcome: Progressing  Goal: Maintains/Returns to pre admission functional level  Description: INTERVENTIONS:  - Perform BMAT or MOVE assessment daily    - Set and communicate daily mobility goal to care team and patient/family/caregiver     - Collaborate with rehabilitation services on mobility goals if consulted  - Out of bed for toileting  - Record patient progress and toleration of activity level   Outcome: Progressing     Problem: Prexisting or High Potential for Compromised Skin Integrity  Goal: Skin integrity is maintained or improved  Description: INTERVENTIONS:  - Identify patients at risk for skin breakdown  - Assess and monitor skin integrity  - Assess and monitor nutrition and hydration status  - Monitor labs   - Assess for incontinence   - Turn and reposition patient  - Assist with mobility/ambulation  - Relieve pressure over bony prominences  - Avoid friction and shearing  - Provide appropriate hygiene as needed including keeping skin clean and dry  - Evaluate need for skin moisturizer/barrier cream  - Collaborate with interdisciplinary team   - Patient/family teaching  - Consider wound care consult   Outcome: Progressing     Problem: Nutrition/Hydration-ADULT  Goal: Nutrient/Hydration intake appropriate for improving, restoring or maintaining nutritional needs  Description: Monitor and assess patient's nutrition/hydration status for malnutrition  Collaborate with interdisciplinary team and initiate plan and interventions as ordered  Monitor patient's weight and dietary intake as ordered or per policy  Utilize nutrition screening tool and intervene as necessary  Determine patient's food preferences and provide high-protein, high-caloric foods as appropriate       INTERVENTIONS:  - Monitor oral intake, urinary output, labs, and treatment plans  - Assess nutrition and hydration status and recommend course of action  - Evaluate amount of meals eaten  - Assist patient with eating if necessary   - Allow adequate time for meals  - Recommend/ encourage appropriate diets, oral nutritional supplements, and vitamin/mineral supplements  - Order, calculate, and assess calorie counts as needed  - Recommend, monitor, and adjust tube feedings and TPN/PPN based on assessed needs  - Assess need for intravenous fluids  - Provide specific nutrition/hydration education as appropriate  - Include patient/family/caregiver in decisions related to nutrition  Outcome: Progressing     Problem: PAIN - ADULT  Goal: Verbalizes/displays adequate comfort level or baseline comfort level  Description: Interventions:  - Encourage patient to monitor pain and request assistance  - Assess pain using appropriate pain scale  - Administer analgesics based on type and severity of pain and evaluate response  - Implement non-pharmacological measures as appropriate and evaluate response  - Consider cultural and social influences on pain and pain management  - Notify physician/advanced practitioner if interventions unsuccessful or patient reports new pain  Outcome: Progressing     Problem: INFECTION - ADULT  Goal: Absence or prevention of progression during hospitalization  Description: INTERVENTIONS:  - Assess and monitor for signs and symptoms of infection  - Monitor lab/diagnostic results  - Monitor all insertion sites, i e  indwelling lines, tubes, and drains  - Monitor endotracheal if appropriate and nasal secretions for changes in amount and color  - [place] appropriate cooling/warming therapies per order  - Administer medications as ordered  - Instruct and encourage patient and family to use good hand hygiene technique  - Identify and instruct in appropriate isolation precautions for identified infection/condition  Outcome: Progressing  Goal: Absence of fever/infection during neutropenic period  Description: INTERVENTIONS:  - Monitor WBC    Outcome: Progressing     Problem: SAFETY ADULT  Goal: Maintain or return to baseline ADL function  Description: INTERVENTIONS:  -  Assess patient's ability to carry out ADLs; assess patient's baseline for ADL function and identify physical deficits which impact ability to perform ADLs (bathing, care of mouth/teeth, toileting, grooming, dressing, etc )  - Assess/evaluate cause of self-care deficits   - Assess range of motion  - Assess patient's mobility; develop plan if impaired  - Assess patient's need for assistive devices and provide as appropriate  - Encourage maximum independence but intervene and supervise when necessary  - Involve family in performance of ADLs  - Assess for home care needs following discharge   - Consider OT consult to assist with ADL evaluation and planning for discharge  - Provide patient education as appropriate  Outcome: Progressing  Goal: Maintains/Returns to pre admission functional level  Description: INTERVENTIONS:  - Perform BMAT or MOVE assessment daily    - Set and communicate daily mobility goal to care team and patient/family/caregiver     - Collaborate with rehabilitation services on mobility goals if consulted  - Out of bed for toileting  - Record patient progress and toleration of activity level   Outcome: Progressing  Goal: Patient will remain free of falls  Description: INTERVENTIONS:  -  Assess patient's ability to carry out ADLs; assess patient's baseline for ADL function and identify physical deficits which impact ability to perform ADLs (bathing, care of mouth/teeth, toileting, grooming, dressing, etc )  - Assess/evaluate cause of self-care deficits   - Assess range of motion  - Assess patient's mobility; develop plan if impaired  - Assess patient's need for assistive devices and provide as appropriate  - Encourage maximum independence but intervene and supervise when necessary  - Involve family in performance of ADLs  - Assess for home care needs following discharge   - Consider OT consult to assist with ADL evaluation and planning for discharge  - Provide patient education as appropriate  Outcome: Progressing     Problem: DISCHARGE PLANNING  Goal: Discharge to home or other facility with appropriate resources  Description: INTERVENTIONS:  - Identify barriers to discharge w/patient and caregiver  - Arrange for needed discharge resources and transportation as appropriate  - Identify discharge learning needs (meds, wound care, etc )  - Arrange for interpretive services to assist at discharge as needed  - Refer to Case Management Department for coordinating discharge planning if the patient needs post-hospital services based on physician/advanced practitioner order or complex needs related to functional status, cognitive ability, or social support system  Outcome: Progressing     Problem: Knowledge Deficit  Goal: Patient/family/caregiver demonstrates understanding of disease process, treatment plan, medications, and discharge instructions  Description: Complete learning assessment and assess knowledge base    Interventions:  - Provide teaching at level of understanding  - Provide teaching via preferred learning methods  Outcome: Progressing

## 2023-05-06 NOTE — ASSESSMENT & PLAN NOTE
No results for input(s): Kendrick Garcia, Shayna Pulliam in the last 72 hours    See A/P under COPD exacerbation

## 2023-05-06 NOTE — PLAN OF CARE
Problem: MOBILITY - ADULT  Goal: Maintain or return to baseline ADL function  Description: INTERVENTIONS:  -  Assess patient's ability to carry out ADLs; assess patient's baseline for ADL function and identify physical deficits which impact ability to perform ADLs (bathing, care of mouth/teeth, toileting, grooming, dressing, etc )  - Assess/evaluate cause of self-care deficits   - Assess range of motion  - Assess patient's mobility; develop plan if impaired  - Assess patient's need for assistive devices and provide as appropriate  - Encourage maximum independence but intervene and supervise when necessary  - Involve family in performance of ADLs  - Assess for home care needs following discharge   - Consider OT consult to assist with ADL evaluation and planning for discharge  - Provide patient education as appropriate  Outcome: Progressing  Goal: Maintains/Returns to pre admission functional level  Description: INTERVENTIONS:  - Perform BMAT or MOVE assessment daily    - Set and communicate daily mobility goal to care team and patient/family/caregiver     - Collaborate with rehabilitation services on mobility goals if consulted  Problem: Prexisting or High Potential for Compromised Skin Integrity  Goal: Skin integrity is maintained or improved  Description: INTERVENTIONS:  - Identify patients at risk for skin breakdown  - Assess and monitor skin integrity  - Assess and monitor nutrition and hydration status  - Monitor labs   - Assess for incontinence   - Turn and reposition patient  - Assist with mobility/ambulation  - Relieve pressure over bony prominences  - Avoid friction and shearing  - Provide appropriate hygiene as needed including keeping skin clean and dry  - Evaluate need for skin moisturizer/barrier cream  - Collaborate with interdisciplinary team   - Patient/family teaching  - Consider wound care consult   Outcome: Progressing     - Out of bed for toileting  - Record patient progress and toleration of activity level   Outcome: Progressing

## 2023-05-06 NOTE — ASSESSMENT & PLAN NOTE
Patient is a level 3 DNR  For now he wishes to pursue aggressive medical management which would include pulmonary toilet bronchodilator therapy steroids BiPAP oxygen therapy and hospitalizations  Discussed with patient and his primary pulmonologist at bedside Dr Radha Conrad  We will try to facilitate outpatient follow-up with palliative care  Wife asking for case management support for finances  Will discuss with case management

## 2023-05-06 NOTE — PROGRESS NOTES
Pulmonary Progress Note  Cyndy Andres  79 y o  male MRN: [id number]  Unit/Bed#: [facility] 805-01 Encounter: [id number]      Assessment  Acute on chronic hypoxic and hypercarbic respiratory failure  Severe End Stage COPD - GOLD Stage IV with acute exacerbation  Acute rhinovirus infection  Acute/on chronic systolic CHF  Pulmonary cachexia and deconditioning      Recommendations:   Unfortunately having another exacerbation  - agree with increasing steroids to methylpred 40mg q8hr for now  - agree with additional diuresis  - will follow CXR  - temporary BiPAP support for an hour or two to rest respiratory muscles  - continue diamox until bicarb<40  - c/w bronchodilators - pulmicort/perforomist BID, xopenex/atrovent TID  - goals of care discussion 5/4 and 5/5 with Dr Alvin Espitia of [facility], Dr Cherie Ortiz and Hans of pulmonary, patient is level III DNR/DNI  Appropriate goals of care considering end stage heart and lung disease with deteriorating functional status  - for respiratory distress refractory to medical therapy and BiPAP, we should consider IV morphine or IV ativan to relieve suffering from respiratory distress in this patient who has expressed yesterday that he is interested in focusing on his quality of life and comfort  Chief Complaint: I'm having a hard time breathing    Subjective:  Short of breath around noon  Ate 30-40% of lunch  Increased work of breathing, using accessory muscles    Objective:  Vitals: Vitals personally reviewed  Vitals:    05/05/23 2300 05/06/23 0218 05/06/23 0727 05/06/23 0742   BP:    100/55   Pulse:    85   Resp:    18   Temp:       TempSrc:       SpO2: 92% 95% 93% 100%   Weight:       Height:          Body mass index is 19 33 kg/m²      Intake/Output Summary (Last 24 hours) at 5/6/2023 1244  Last data filed at 5/5/2023 1253  Gross per 24 hour   Intake 200 ml   Output --   Net 200 ml     Invasive Devices     Peripheral Intravenous Line  Duration           Peripheral IV 05/06/23 Right;Ventral (anterior) Forearm <1 day          Drain  Duration           External Urinary Catheter Small <1 day                Physical Exam  General:  Patient is sitting in bed, awake, thin and frail appearing  HEET: head is normocephalic, atraumatic   Eyes EOMI, no scleral icterus  In respiratory distress  Neck: Supple, with full range of motion, using accessory muscles  CV:  Regular rhythm, +S1 S2  Lungs: End expiratory coarse airway sounds, +wheezing  Abdomen: Soft, non-tender, non-distended  Extremities: Capillary refill is normal     Neuro: No focal motor/sensory deficits     Skin: Warm, No rashes or ulcerations appreciable    Labs: I have personally reviewed pertinent lab results    Laboratory and Diagnostics  Results from last 7 days   Lab Units 05/05/23  0503 05/04/23 0443 05/03/23  0520 05/02/23  0453 05/01/23  0424 04/30/23  1804   WBC Thousand/uL 7 06 6 54 6 70 4 13* 2 94* 7 16   HEMOGLOBIN g/dL 9 5* 10 0* 9 8* 9 9* 11 3* 12 1   HEMATOCRIT % 29 9* 32 8* 31 0* 31 0* 38 4 39 5   PLATELETS Thousands/uL 213 235 224 231 223 269   NEUTROS PCT % 83* 72 89* 86* 89* 69   MONOS PCT % 9 13* 5 6 1* 14*     Results from last 7 days   Lab Units 05/05/23  0503 05/04/23 0443 05/03/23  0520 05/02/23  0453 05/01/23  0430 04/30/23  2241 04/30/23  1804   SODIUM mmol/L 132* 133* 130* 130* 132* 132* 129*   POTASSIUM mmol/L 3 5 3 6 4 2 4 3 4 6 4 3 4 7   CHLORIDE mmol/L 93* 90* 89* 86* 87* 86* 86*   CO2 mmol/L 40* 43* 39* >45* >45* >45* 42*   ANION GAP mmol/L -1* 0* 2*  --   --   --  1*   BUN mg/dL 19 19 22 33* 34* 37* 37*   CREATININE mg/dL 0 54* 0 56* 0 55* 0 67 0 79 0 72 0 91   CALCIUM mg/dL 8 9 8 6 8 6 9 0 9 4 9 5 9 7   GLUCOSE RANDOM mg/dL 91 77 201* 155* 145* 122 152*   ALT U/L  --   --   --  20  --   --  23   AST U/L  --   --   --  25  --   --  35   ALK PHOS U/L  --   --   --  51  --   --  68   ALBUMIN g/dL  --   --   --  2 8*  --   --  3 3*   TOTAL BILIRUBIN mg/dL  --   --   --  0 49  --   --  0 44 "                      Results from last 7 days   Lab Units 05/02/23  0453 04/30/23  1804   PROCALCITONIN ng/ml 0 05 0 10           ABG:   Results from last 7 days   Lab Units 05/01/23  1007   PH ART  7 391   PCO2 ART mm Hg 73 3*   PO2 ART mm Hg 77 2   HCO3 ART mmol/L 43 5*   BASE EXC ART mmol/L 15 5   ABG SOURCE  Brachial, Right       Imaging and other studies: I have personally reviewed pertinent films in PACS  4/30/23 -bilateral interstitial infiltrates, some cephalization, no obvious new consolidation compared to prior radiographs    Echocardiogram:   5/1/23  •  Left Ventricle: Left ventricular cavity size is mildly dilated  Wall thickness is normal  The left ventricular ejection fraction is 20%  Systolic function is severely reduced  There is severe global hypokinesis with regional variation  •  IVS: There is abnormal septal motion consistent with left bundle branch block  There is diastolic flattening of the interventricular septum consistent with right ventricle volume overload  •  Right Ventricle: Right ventricular cavity size is normal  Systolic function is normal   •  Aorta: The aortic root is mildly dilated  The aortic root is 3 60 cm  •  Prior TTE study available for comparison  Prior study date: 2/21/2023  Changes noted when compared to prior study  Changes include: Compared to previous TTE, LVEF has now reduced to 20%  Code Status: Level 3 - DNAR and DNI      Gucci Kaplan MD  Pulmonary, Critical Care and Sleep Medicine  [facility]     Portions of the record may have been created with voice recognition software  Occasional wrong word or \"sound a like\" substitutions may have occurred due to the inherent limitations of voice recognition software  Please read the chart carefully and recognize, using context, where substitutions have occurred       "

## 2023-05-06 NOTE — PROGRESS NOTES
[facility]  Progress Note  Name: Iban Torres  MRN: [id number]  Unit/Bed#: [facility] 805-01 I Date of Admission: 4/30/2023   Date of Service: 5/6/2023 I Hospital Day: 6    Assessment/Plan   * COPD exacerbation ([facility] )  Assessment & Plan  PFTs 2020: FEV1/FVC: 30%, FVC: 2L, 59% predicted, FEV1: 0 6L 22% predicted, %, %, DLCO 45%    Continue Xopenex/Atrovent nebs q6  Continue home pulmicort and formeterol  Solumedrol q12; continue taper and d/c home prednisone 5 mg qd  Trial bipap now for inc WOB  Trial of steroids  Continue azithromycin 500 mg x3 days (until 5/3/23); then back to 250 mg 2-3x per week  BiPAP qhs  Wean O2 to home 3L  SPO2>88%  Pulm consult noted  Level 3 dnr    HLD (hyperlipidemia)  Assessment & Plan  Continue home pravastatin 80 mg    Chronic HFrEF (heart failure with reduced ejection fraction) ([facility])  Assessment & Plan  Wt Readings from Last 3 Encounters:   05/05/23 44 9 kg (99 lb)   04/13/23 48 5 kg (107 lb)   03/27/23 46 3 kg (102 lb)     · Left Ventricle: Left ventricular cavity size is mildly dilated  Wall thickness is normal  The left ventricular ejection fraction is 20%  Systolic function is severely reduced  There is severe global hypokinesis with regional variation  Plan:  restart home torsemide   diamox 25 mg qd to help excrete bicarb as per critical care  We will follow-up with pulmonary  Continue lopressor 12 5mg bid  Add GDMT as tolerated  Cardiology following; ? AICD candidate        Protein-calorie malnutrition ([place] [id number] )  Assessment & Plan  Malnutrition Findings:   Adult Malnutrition type: Chronic illness  Adult Degree of Malnutrition: Malnutrition of moderate degree  Malnutrition Characteristics: Muscle loss, Inadequate energy, Fluid accumulation                  360 Statement: Malnutrition of moderate degree in kari cointext of chronic illness related to SOB causing decreased intake as evidenced by severe depletion of muscle at clavicles, and mild depeption at [employer]  Treated with nutritional supplements    BMI Findings: Body mass index is 19 33 kg/m²  Continue Cardiac Diet      BPH with obstruction/lower urinary tract symptoms  Assessment & Plan  Continue home flomax    Goals of care, counseling/discussion  Assessment & Plan  Patient is a level 3 DNR  For now he wishes to pursue aggressive medical management which would include pulmonary toilet bronchodilator therapy steroids BiPAP oxygen therapy and hospitalizations  Discussed with patient and his primary pulmonologist at bedside Dr Ajay Bowen  We will try to facilitate outpatient follow-up with palliative care  Wife asking for case management support for finances  Will discuss with case management  Chronic respiratory failure with hypoxia and hypercapnia (HCC)  Assessment & Plan  No results for input(s): Jody Donis in the last 72 hours  See A/P under COPD exacerbation    Mood disorder ([facility] )  Assessment & Plan  Continue home Lexapro     Vitamin D deficiency  Assessment & Plan  Continue home D2             VTE Pharmacologic Prophylaxis:   Pharmacologic: Enoxaparin (Lovenox)  Mechanical VTE Prophylaxis in Place: No    Patient Centered Rounds: I have performed bedside rounds with nursing staff today  Time Spent for Care: 15 minutes  More than 50% of total time spent on counseling and coordination of care as described above  Current Length of Stay: 6 day(s)    Current Patient Status: Inpatient       Code Status: Level 3 - DNAR and DNI      Subjective:   Patient on bipap  Feeling better    Objective:     Vitals:   No data recorded  HR:  [85] 85  Resp:  [18] 18  BP: (100)/(55) 100/55  SpO2:  [92 %-100 %] 100 %  Body mass index is 19 33 kg/m²  Input and Output Summary (last 24 hours):        Intake/Output Summary (Last 24 hours) at 5/6/2023 0826  Last data filed at 5/5/2023 1255  Gross per 24 hour   Intake 200 ml   Output 300 ml   Net -100 ml       Physical Exam:     Physical Exam  HENT:      Head: Normocephalic and atraumatic  Cardiovascular:      Rate and Rhythm: Normal rate and regular rhythm  Pulmonary:      Breath sounds: Wheezing present  Neurological:      Mental Status: He is alert  Additional Data:     Labs:    Results from last 7 days   Lab Units 05/05/23  0503   WBC Thousand/uL 7 06   HEMOGLOBIN g/dL 9 5*   HEMATOCRIT % 29 9*   PLATELETS Thousands/uL 213   NEUTROS PCT % 83*   LYMPHS PCT % 7*   MONOS PCT % 9   EOS PCT % 0     Results from last 7 days   Lab Units 05/05/23  0503 05/03/23  0520 05/02/23  0453   POTASSIUM mmol/L 3 5   < > 4 3   CHLORIDE mmol/L 93*   < > 86*   CO2 mmol/L 40*   < > >45*   BUN mg/dL 19   < > 33*   CREATININE mg/dL 0 54*   < > 0 67   CALCIUM mg/dL 8 9   < > 9 0   ALK PHOS U/L  --   --  51   ALT U/L  --   --  20   AST U/L  --   --  25    < > = values in this interval not displayed  * I Have Reviewed All Lab Data Listed Above  * Additional Pertinent Lab Tests Reviewed: All Labs Within Last 24 Hours Reviewed      Recent Cultures (last 7 days):     Results from last 7 days   Lab Units 05/01/23  0038 05/01/23  0030 04/30/23 2234 04/30/23 2233   BLOOD CULTURE  No Growth After 5 Days  No Growth After 5 Days    --   --    URINE CULTURE   --   --  50,000-59,000 cfu/ml Klebsiella oxytoca*  10,000-19,000 cfu/ml  --    LEGIONELLA URINARY ANTIGEN   --   --   --  Negative       Last 24 Hours Medication List:   Current Facility-Administered Medications   Medication Dose Route Frequency Provider Last Rate   • aspirin  81 mg Oral Daily Cachorro Gamboa PA-C     • azithromycin  250 mg Oral Q24H Cachorro Gamboa PA-C     • budesonide  0 5 mg Nebulization Q12H Cachorro Gamboa PA-C     • cyanocobalamin  1,000 mcg Oral Daily Cachorro Gamboa PA-C     • enoxaparin  40 mg Subcutaneous Daily Cachorro Gamboa PA-C     • ergocalciferol  50,000 Units Oral Q28 Days Cachorro Gamboa GIGI     • formoterol  20 mcg Nebulization Q12H Janeth Saleh PA-C     • guaiFENesin  1,200 mg Oral Q12H [address] Janeth Saleh PA-C     • ipratropium  0 5 mg Nebulization Q6H Janeth Saleh PA-C     • levalbuterol  1 25 mg Nebulization Q6H Janeth Saleh PA-C     • levalbuterol  1 25 mg Nebulization Q6H PRN Imani Roberson DO     • melatonin  3 mg Oral HS Janeth Saleh PA-C     • pravastatin  80 mg Oral Daily With GIGI Mckeon     • [START ON 5/7/2023] predniSONE  30 mg Oral Daily Janeth Saleh PA-C      Followed by   • [START ON 5/10/2023] predniSONE  20 mg Oral Daily Janeth Saleh PA-C      Followed by   • [START ON 5/13/2023] predniSONE  10 mg Oral Daily Janeth Saleh PA-C      Followed by   • [START ON 5/16/2023] predniSONE  5 mg Oral Daily Janeth Saleh PA-C     • tamsulosin  0 4 mg Oral Daily With GIGI Mckeon     • torsemide  20 mg Oral Daily Janeth Saleh PA-C          Today, Patient Was Seen By: Krish Zaragoza DO    ** Please Note: Dictation voice to text software may have been used in the creation of this document   **

## 2023-05-07 RX ADMIN — PRAVASTATIN SODIUM 80 MG: 80 TABLET ORAL at 17:06

## 2023-05-07 RX ADMIN — LEVALBUTEROL HYDROCHLORIDE 1.25 MG: 1.25 SOLUTION RESPIRATORY (INHALATION) at 07:33

## 2023-05-07 RX ADMIN — AZITHROMYCIN MONOHYDRATE 250 MG: 250 TABLET ORAL at 21:22

## 2023-05-07 RX ADMIN — FORMOTEROL FUMARATE DIHYDRATE 20 MCG: 20 SOLUTION RESPIRATORY (INHALATION) at 07:33

## 2023-05-07 RX ADMIN — GUAIFENESIN 1200 MG: 600 TABLET, EXTENDED RELEASE ORAL at 21:22

## 2023-05-07 RX ADMIN — METHYLPREDNISOLONE SODIUM SUCCINATE 40 MG: 40 INJECTION, POWDER, FOR SOLUTION INTRAMUSCULAR; INTRAVENOUS at 21:22

## 2023-05-07 RX ADMIN — IPRATROPIUM BROMIDE 0.5 MG: 0.5 SOLUTION RESPIRATORY (INHALATION) at 19:47

## 2023-05-07 RX ADMIN — LEVALBUTEROL HYDROCHLORIDE 1.25 MG: 1.25 SOLUTION RESPIRATORY (INHALATION) at 02:50

## 2023-05-07 RX ADMIN — METHYLPREDNISOLONE SODIUM SUCCINATE 40 MG: 40 INJECTION, POWDER, FOR SOLUTION INTRAMUSCULAR; INTRAVENOUS at 13:41

## 2023-05-07 RX ADMIN — LEVALBUTEROL HYDROCHLORIDE 1.25 MG: 1.25 SOLUTION RESPIRATORY (INHALATION) at 13:34

## 2023-05-07 RX ADMIN — BUDESONIDE 0.5 MG: 0.5 INHALANT ORAL at 19:47

## 2023-05-07 RX ADMIN — TAMSULOSIN HYDROCHLORIDE 0.4 MG: 0.4 CAPSULE ORAL at 17:06

## 2023-05-07 RX ADMIN — CYANOCOBALAMIN TAB 500 MCG 1000 MCG: 500 TAB at 08:06

## 2023-05-07 RX ADMIN — MELATONIN 3 MG: at 21:22

## 2023-05-07 RX ADMIN — ENOXAPARIN SODIUM 40 MG: 40 INJECTION SUBCUTANEOUS at 08:06

## 2023-05-07 RX ADMIN — IPRATROPIUM BROMIDE 0.5 MG: 0.5 SOLUTION RESPIRATORY (INHALATION) at 07:33

## 2023-05-07 RX ADMIN — IPRATROPIUM BROMIDE 0.5 MG: 0.5 SOLUTION RESPIRATORY (INHALATION) at 13:34

## 2023-05-07 RX ADMIN — LEVALBUTEROL HYDROCHLORIDE 1.25 MG: 1.25 SOLUTION RESPIRATORY (INHALATION) at 19:48

## 2023-05-07 RX ADMIN — IPRATROPIUM BROMIDE 0.5 MG: 0.5 SOLUTION RESPIRATORY (INHALATION) at 02:49

## 2023-05-07 RX ADMIN — ASPIRIN 81 MG CHEWABLE TABLET 81 MG: 81 TABLET CHEWABLE at 08:06

## 2023-05-07 RX ADMIN — FORMOTEROL FUMARATE DIHYDRATE 20 MCG: 20 SOLUTION RESPIRATORY (INHALATION) at 19:47

## 2023-05-07 RX ADMIN — METHYLPREDNISOLONE SODIUM SUCCINATE 40 MG: 40 INJECTION, POWDER, FOR SOLUTION INTRAMUSCULAR; INTRAVENOUS at 05:21

## 2023-05-07 RX ADMIN — GUAIFENESIN 1200 MG: 600 TABLET, EXTENDED RELEASE ORAL at 08:06

## 2023-05-07 RX ADMIN — BUDESONIDE 0.5 MG: 0.5 INHALANT ORAL at 07:33

## 2023-05-07 NOTE — ASSESSMENT & PLAN NOTE
Malnutrition Findings:   Adult Malnutrition type: Chronic illness  Adult Degree of Malnutrition: Malnutrition of moderate degree  Malnutrition Characteristics: Muscle loss, Inadequate energy, Fluid accumulation                  360 Statement: Malnutrition of moderate degree in kari cointext of chronic illness related to SOB causing decreased intake as evidenced by severe depletion of muscle at clavicles, and mild depeption at [employer]  Treated with nutritional supplements    BMI Findings: Body mass index is 19 33 kg/m²       Continue Cardiac Diet

## 2023-05-07 NOTE — PLAN OF CARE
Problem: Nutrition/Hydration-ADULT  Goal: Nutrient/Hydration intake appropriate for improving, restoring or maintaining nutritional needs  Description: Monitor and assess patient's nutrition/hydration status for malnutrition  Collaborate with interdisciplinary team and initiate plan and interventions as ordered  Monitor patient's weight and dietary intake as ordered or per policy  Utilize nutrition screening tool and intervene as necessary  Determine patient's food preferences and provide high-protein, high-caloric foods as appropriate  INTERVENTIONS:  - Monitor oral intake, urinary output, labs, and treatment plans  - Assess nutrition and hydration status and recommend course of action  - Evaluate amount of meals eaten  - Assist patient with eating if necessary   - Allow adequate time for meals  - Recommend/ encourage appropriate diets, oral nutritional supplements, and vitamin/mineral supplements  - Order, calculate, and assess calorie counts as needed  - Recommend, monitor, and adjust tube feedings and TPN/PPN based on assessed needs  - Assess need for intravenous fluids  - Provide specific nutrition/hydration education as appropriate  - Include patient/family/caregiver in decisions related to nutrition  Outcome: Progressing     Problem: Nutrition/Hydration-ADULT  Goal: Nutrient/Hydration intake appropriate for improving, restoring or maintaining nutritional needs  Description: Monitor and assess patient's nutrition/hydration status for malnutrition  Collaborate with interdisciplinary team and initiate plan and interventions as ordered  Monitor patient's weight and dietary intake as ordered or per policy  Utilize nutrition screening tool and intervene as necessary  Determine patient's food preferences and provide high-protein, high-caloric foods as appropriate       INTERVENTIONS:  - Monitor oral intake, urinary output, labs, and treatment plans  - Assess nutrition and hydration status and recommend course of action  - Evaluate amount of meals eaten  - Assist patient with eating if necessary   - Allow adequate time for meals  - Recommend/ encourage appropriate diets, oral nutritional supplements, and vitamin/mineral supplements  - Order, calculate, and assess calorie counts as needed  - Recommend, monitor, and adjust tube feedings and TPN/PPN based on assessed needs  - Assess need for intravenous fluids  - Provide specific nutrition/hydration education as appropriate  - Include patient/family/caregiver in decisions related to nutrition  Outcome: Progressing     Problem: PAIN - ADULT  Goal: Verbalizes/displays adequate comfort level or baseline comfort level  Description: Interventions:  - Encourage patient to monitor pain and request assistance  - Assess pain using appropriate pain scale  - Administer analgesics based on type and severity of pain and evaluate response  - Implement non-pharmacological measures as appropriate and evaluate response  - Consider cultural and social influences on pain and pain management  - Notify physician/advanced practitioner if interventions unsuccessful or patient reports new pain  Outcome: Progressing     Problem: INFECTION - ADULT  Goal: Absence or prevention of progression during hospitalization  Description: INTERVENTIONS:  - Assess and monitor for signs and symptoms of infection  - Monitor lab/diagnostic results  - Monitor all insertion sites, i e  indwelling lines, tubes, and drains  - Monitor endotracheal if appropriate and nasal secretions for changes in amount and color  - [place] appropriate cooling/warming therapies per order  - Administer medications as ordered  - Instruct and encourage patient and family to use good hand hygiene technique  - Identify and instruct in appropriate isolation precautions for identified infection/condition  Outcome: Progressing  Goal: Absence of fever/infection during neutropenic period  Description: INTERVENTIONS:  - Monitor WBC    Outcome: Progressing

## 2023-05-07 NOTE — PROGRESS NOTES
[facility]  Progress Note  Name: Rosalee Primrose  MRN: [id number]  Unit/Bed#: [facility]PHP 805-01 I Date of Admission: 4/30/2023   Date of Service: 5/7/2023 I Hospital Day: 7    Assessment/Plan   * COPD exacerbation ([facility] )  Assessment & Plan  PFTs 2020: FEV1/FVC: 30%, FVC: 2L, 59% predicted, FEV1: 0 6L 22% predicted, %, %, DLCO 45%    Continue Xopenex/Atrovent nebs q6  Continue home pulmicort and formeterol  Solumedrol q12; continue taper and d/c home prednisone 5 mg qd  Continue BiPAP qhs  Continue azithromycin 500 mg x3 days (until 5/3/23); then back to 250 mg 2-3x per week  BiPAP qhs  Wean O2 to home 3L  SPO2>88%  Pulm consult noted  We will follow-up with family regarding ongoing goals of care    HLD (hyperlipidemia)  Assessment & Plan  Continue home pravastatin 80 mg    Chronic HFrEF (heart failure with reduced ejection fraction) (HCC)  Assessment & Plan  Wt Readings from Last 3 Encounters:   05/05/23 44 9 kg (99 lb)   04/13/23 48 5 kg (107 lb)   03/27/23 46 3 kg (102 lb)     · Left Ventricle: Left ventricular cavity size is mildly dilated  Wall thickness is normal  The left ventricular ejection fraction is 20%  Systolic function is severely reduced  There is severe global hypokinesis with regional variation  Plan:  restart home torsemide   diamox 25 mg qd to help excrete bicarb as per critical care  We will follow-up with pulmonary  Continue lopressor 12 5mg bid  Add GDMT as tolerated  Cardiology following; ? AICD candidate        Protein-calorie malnutrition ([place] [address] )  Assessment & Plan  Malnutrition Findings:   Adult Malnutrition type: Chronic illness  Adult Degree of Malnutrition: Malnutrition of moderate degree  Malnutrition Characteristics: Muscle loss, Inadequate energy, Fluid accumulation                  360 Statement: Malnutrition of moderate degree in kari cointext of chronic illness related to SOB causing decreased intake as evidenced by severe depletion of muscle at clavicles, and mild depeption at [place]  Treated with nutritional supplements    BMI Findings: Body mass index is 19 33 kg/m²  Continue Cardiac Diet      BPH with obstruction/lower urinary tract symptoms  Assessment & Plan  Continue home flomax    Goals of care, counseling/discussion  Assessment & Plan  Patient is a level 3 DNR  For now he wishes to pursue aggressive medical management which would include pulmonary toilet bronchodilator therapy steroids BiPAP oxygen therapy and hospitalizations  Discussed with patient and his primary pulmonologist at bedside Dr Sabas Mancia  We will try to facilitate outpatient follow-up with palliative care  Wife asking for case management support for finances  Will discuss with case management  Chronic respiratory failure with hypoxia and hypercapnia (HCC)  Assessment & Plan  No results for input(s): Ky Rizo in the last 72 hours  See A/P under COPD exacerbation    Mood disorder ([facility] [address] )  Assessment & Plan  Continue home Lexapro     Vitamin D deficiency  Assessment & Plan  Continue home D2             VTE Pharmacologic Prophylaxis:   Pharmacologic: Enoxaparin (Lovenox)  Mechanical VTE Prophylaxis in Place: No    Patient Centered Rounds: I have performed bedside rounds with nursing staff today  Time Spent for Care: 15 minutes  More than 50% of total time spent on counseling and coordination of care as described above  Current Length of Stay: 7 day(s)    Current Patient Status: Inpatient       Code Status: Level 3 - DNAR and DNI      Subjective:   nad    Objective:     Vitals:   Temp (24hrs), Av 8 °F (37 1 °C), Min:98 4 °F (36 9 °C), Max:99 2 °F (37 3 °C)    Temp:  [98 4 °F (36 9 °C)-99 2 °F (37 3 °C)] 98 7 °F (37 1 °C)  HR:  [75-95] 89  Resp:  [16-18] 18  BP: (100-119)/(56-57) 108/57  SpO2:  [90 %-100 %] 98 %  Body mass index is 19 33 kg/m²  Input and Output Summary (last 24 hours):        Intake/Output Summary (Last 24 hours) at 5/7/2023 0853  Last data filed at 5/6/2023 1802  Gross per 24 hour   Intake 120 ml   Output 1300 ml   Net -1180 ml       Physical Exam:     Physical Exam  Constitutional:       Appearance: Normal appearance  HENT:      Head: Normocephalic and atraumatic  Cardiovascular:      Rate and Rhythm: Normal rate and regular rhythm  Pulmonary:      Effort: Pulmonary effort is normal       Breath sounds: Normal breath sounds  Skin:     General: Skin is warm and dry  Neurological:      General: No focal deficit present  Mental Status: He is alert and oriented to person, place, and time  Psychiatric:         Mood and Affect: Mood normal          Additional Data:     Labs:    Results from last 7 days   Lab Units 05/05/23  0503   WBC Thousand/uL 7 06   HEMOGLOBIN g/dL 9 5*   HEMATOCRIT % 29 9*   PLATELETS Thousands/uL 213   NEUTROS PCT % 83*   LYMPHS PCT % 7*   MONOS PCT % 9   EOS PCT % 0     Results from last 7 days   Lab Units 05/05/23  0503 05/03/23  0520 05/02/23  0453   POTASSIUM mmol/L 3 5   < > 4 3   CHLORIDE mmol/L 93*   < > 86*   CO2 mmol/L 40*   < > >45*   BUN mg/dL 19   < > 33*   CREATININE mg/dL 0 54*   < > 0 67   CALCIUM mg/dL 8 9   < > 9 0   ALK PHOS U/L  --   --  51   ALT U/L  --   --  20   AST U/L  --   --  25    < > = values in this interval not displayed  * I Have Reviewed All Lab Data Listed Above  * Additional Pertinent Lab Tests Reviewed: All Labs Within Last 24 Hours Reviewed      Recent Cultures (last 7 days):     Results from last 7 days   Lab Units 05/01/23  0038 05/01/23  0030 04/30/23 2234 04/30/23 2233   BLOOD CULTURE  No Growth After 5 Days  No Growth After 5 Days    --   --    URINE CULTURE   --   --  50,000-59,000 cfu/ml Klebsiella oxytoca*  10,000-19,000 cfu/ml  --    LEGIONELLA URINARY ANTIGEN   --   --   --  Negative       Last 24 Hours Medication List:   Current Facility-Administered Medications   Medication Dose Route Frequency Provider Last Rate • aspirin  81 mg Oral Daily Ellen Son PA-C     • azithromycin  250 mg Oral Q24H Ellen Son PA-C     • budesonide  0 5 mg Nebulization Q12H Ellen Son PA-C     • cyanocobalamin  1,000 mcg Oral Daily Ellen Son PA-C     • enoxaparin  40 mg Subcutaneous Daily Ellen Son PA-C     • ergocalciferol  50,000 Units Oral Q28 Days Ellen Son PA-C     • formoterol  20 mcg Nebulization Q12H Ellen Son PA-C     • guaiFENesin  1,200 mg Oral Q12H [address] Ellen Son PA-C     • ipratropium  0 5 mg Nebulization Q6H Ellen Son PA-C     • levalbuterol  1 25 mg Nebulization Q6H Ellen Son PA-C     • levalbuterol  1 25 mg Nebulization Q6H PRN Hetul Roberson, DO     • melatonin  3 mg Oral HS Ellen Son PA-C     • methylPREDNISolone sodium succinate  40 mg Intravenous Q8H [address] Hetul Roberson, DO     • pravastatin  80 mg Oral Daily With GIGI Mckeon     • tamsulosin  0 4 mg Oral Daily With GIGI Mckeon     • torsemide  20 mg Oral Daily Ellen Son PA-C          Today, Patient Was Seen By: Noah Hargrove DO    ** Please Note: Dictation voice to text software may have been used in the creation of this document   **

## 2023-05-08 RX ORDER — METHYLPREDNISOLONE SODIUM SUCCINATE 40 MG/ML
40 INJECTION, POWDER, LYOPHILIZED, FOR SOLUTION INTRAMUSCULAR; INTRAVENOUS DAILY
Status: DISCONTINUED | OUTPATIENT
Start: 2023-05-09 | End: 2023-05-09

## 2023-05-08 RX ADMIN — FORMOTEROL FUMARATE DIHYDRATE 20 MCG: 20 SOLUTION RESPIRATORY (INHALATION) at 07:17

## 2023-05-08 RX ADMIN — MELATONIN 3 MG: at 21:29

## 2023-05-08 RX ADMIN — IPRATROPIUM BROMIDE 0.5 MG: 0.5 SOLUTION RESPIRATORY (INHALATION) at 19:11

## 2023-05-08 RX ADMIN — BUDESONIDE 0.5 MG: 0.5 INHALANT ORAL at 07:17

## 2023-05-08 RX ADMIN — LEVALBUTEROL HYDROCHLORIDE 1.25 MG: 1.25 SOLUTION RESPIRATORY (INHALATION) at 07:17

## 2023-05-08 RX ADMIN — IPRATROPIUM BROMIDE 0.5 MG: 0.5 SOLUTION RESPIRATORY (INHALATION) at 03:04

## 2023-05-08 RX ADMIN — IPRATROPIUM BROMIDE 0.5 MG: 0.5 SOLUTION RESPIRATORY (INHALATION) at 07:17

## 2023-05-08 RX ADMIN — FORMOTEROL FUMARATE DIHYDRATE 20 MCG: 20 SOLUTION RESPIRATORY (INHALATION) at 19:11

## 2023-05-08 RX ADMIN — PRAVASTATIN SODIUM 80 MG: 80 TABLET ORAL at 16:21

## 2023-05-08 RX ADMIN — CYANOCOBALAMIN TAB 500 MCG 1000 MCG: 500 TAB at 08:08

## 2023-05-08 RX ADMIN — GUAIFENESIN 1200 MG: 600 TABLET, EXTENDED RELEASE ORAL at 21:29

## 2023-05-08 RX ADMIN — LEVALBUTEROL HYDROCHLORIDE 1.25 MG: 1.25 SOLUTION RESPIRATORY (INHALATION) at 19:11

## 2023-05-08 RX ADMIN — BUDESONIDE 0.5 MG: 0.5 INHALANT ORAL at 19:11

## 2023-05-08 RX ADMIN — TAMSULOSIN HYDROCHLORIDE 0.4 MG: 0.4 CAPSULE ORAL at 16:21

## 2023-05-08 RX ADMIN — ENOXAPARIN SODIUM 40 MG: 40 INJECTION SUBCUTANEOUS at 08:08

## 2023-05-08 RX ADMIN — IPRATROPIUM BROMIDE 0.5 MG: 0.5 SOLUTION RESPIRATORY (INHALATION) at 13:25

## 2023-05-08 RX ADMIN — AZITHROMYCIN MONOHYDRATE 250 MG: 250 TABLET ORAL at 21:29

## 2023-05-08 RX ADMIN — LEVALBUTEROL HYDROCHLORIDE 1.25 MG: 1.25 SOLUTION RESPIRATORY (INHALATION) at 13:25

## 2023-05-08 RX ADMIN — LEVALBUTEROL HYDROCHLORIDE 1.25 MG: 1.25 SOLUTION RESPIRATORY (INHALATION) at 03:04

## 2023-05-08 RX ADMIN — ASPIRIN 81 MG CHEWABLE TABLET 81 MG: 81 TABLET CHEWABLE at 08:08

## 2023-05-08 RX ADMIN — GUAIFENESIN 1200 MG: 600 TABLET, EXTENDED RELEASE ORAL at 08:08

## 2023-05-08 RX ADMIN — METHYLPREDNISOLONE SODIUM SUCCINATE 40 MG: 40 INJECTION, POWDER, FOR SOLUTION INTRAMUSCULAR; INTRAVENOUS at 05:15

## 2023-05-08 NOTE — PROGRESS NOTES
"[facility]  Progress Note  Name: Kandis Sheets  MRN: [id number]  Unit/Bed#: [facility]PHP 805-01 I Date of Admission: 4/30/2023   Date of Service: 5/8/2023 I Hospital Day: 8    Assessment/Plan   * COPD exacerbation ([address] )  Assessment & Plan  PFTs 2020: FEV1/FVC: 30%, FVC: 2L, 59% predicted, FEV1: 0 6L 22% predicted, %, %, DLCO 45%    Continue Xopenex/Atrovent nebs q6  Continue home pulmicort and formeterol  Do Solu-Medrol to daily dosing  Will eventually need to be transition to oral prednisone down to a goal dose of  5 mg qd  Continue BiPAP qhs and as needed  S/post Zithromax  BiPAP qhs  Wean O2 to home 3L  SPO2>88%  Pulm consult noted  Level 3 DNR  Patient would still want aggressive medical management including steroids and hospitalization oxygen pulmonary toilet and BiPAP  However, he would not want to be intubated or have CPR  Long-term goal would be to spend as much time at home as possible  Patient reports to me that he wishes to go home and if he gets sick again he would come back \"1 more time\"  However he would not want aggressive escalation of care including ICU level care outside of pulmonary toilet and medications  Cindy Hester HLD (hyperlipidemia)  Assessment & Plan  Continue home pravastatin 80 mg    Chronic HFrEF (heart failure with reduced ejection fraction) (HCC)  Assessment & Plan  Wt Readings from Last 3 Encounters:   05/05/23 44 9 kg (99 lb)   04/13/23 48 5 kg (107 lb)   03/27/23 46 3 kg (102 lb)     · Left Ventricle: Left ventricular cavity size is mildly dilated  Wall thickness is normal  The left ventricular ejection fraction is 20%  Systolic function is severely reduced  There is severe global hypokinesis with regional variation  Plan:  restart home torsemide   diamox 25 mg qd to help excrete bicarb as per critical care  We will follow-up with pulmonary    Continue lopressor 12 5mg bid  Add GDMT as tolerated  We will defer on " ICD        Protein-calorie malnutrition ([facility] [address] )  Assessment & Plan  Malnutrition Findings:   Adult Malnutrition type: Chronic illness  Adult Degree of Malnutrition: Malnutrition of moderate degree  Malnutrition Characteristics: Muscle loss, Inadequate energy, Fluid accumulation                  360 Statement: Malnutrition of moderate degree in kari cointext of chronic illness related to SOB causing decreased intake as evidenced by severe depletion of muscle at clavicles, and mild depeption at [place]  Treated with nutritional supplements    BMI Findings: Body mass index is 19 33 kg/m²  Continue Cardiac Diet      BPH with obstruction/lower urinary tract symptoms  Assessment & Plan  Continue home flomax    Goals of care, counseling/discussion  Assessment & Plan  Patient is a level 3 DNR  For now he wishes to pursue aggressive medical management which would include pulmonary toilet bronchodilator therapy steroids BiPAP oxygen therapy and hospitalizations  Discussed with patient and his primary pulmonologist at bedside Dr Silvana Kaplan  We will try to facilitate outpatient follow-up with palliative care  Wife asking for case management support for finances  Case management aware  Wife is preoccupied about eventual need for  cost/expenses  Discussed case with palliative care  They will continue to follow  Plan will be to follow as outpatient for continued symptom management  Family aware of poor prognosis  They understand that if he were to return home he would likely be readmitted within the next 2 to 3 weeks  Chronic respiratory failure with hypoxia and hypercapnia (HCC)  Assessment & Plan  Taper steroids  Level 3 DNR  Continue with supportive care  Long-term prognosis poor  Family is aware of poor prognosis  Ideally would like to optimize as much time at home as possible  Family not ready for hospice at this particular juncture  However, if he were to decline he would be a level 3 DNR  "Family however would still bring him to the hospital at least \"1 more time\"  We will continue to have palliative care engage  Mood disorder ([address] )  Assessment & Plan  Continue home Lexapro     Vitamin D deficiency  Assessment & Plan  Continue home D2             VTE Pharmacologic Prophylaxis:   Pharmacologic: Enoxaparin (Lovenox)  Mechanical VTE Prophylaxis in Place: No    Patient Centered Rounds: I have performed bedside rounds with nursing staff today  Time Spent for Care: 15 minutes  More than 50% of total time spent on counseling and coordination of care as described above  Current Length of Stay: 8 day(s)    Current Patient Status: Inpatient   Certification Statement: The patient will continue to require additional inpatient hospital stay due to Need to monitor symptoms      Code Status: Level 3 - DNAR and DNI      Subjective:   No acute distress    Objective:     Vitals:   Temp (24hrs), Av 8 °F (37 1 °C), Min:98 2 °F (36 8 °C), Max:99 1 °F (37 3 °C)    Temp:  [98 2 °F (36 8 °C)-99 1 °F (37 3 °C)] 98 2 °F (36 8 °C)  HR:  [87-99] 87  Resp:  [12-20] 14  BP: (101-112)/(56-67) 101/56  SpO2:  [95 %-98 %] 96 %  Body mass index is 19 33 kg/m²  Input and Output Summary (last 24 hours): Intake/Output Summary (Last 24 hours) at 2023 0903  Last data filed at 2023 0519  Gross per 24 hour   Intake 120 ml   Output 1550 ml   Net -1430 ml       Physical Exam:     Physical Exam  Constitutional:       Appearance: Normal appearance  Eyes:      Pupils: Pupils are equal, round, and reactive to light  Cardiovascular:      Rate and Rhythm: Normal rate and regular rhythm  Pulmonary:      Breath sounds: Wheezing and rhonchi present  Abdominal:      General: Abdomen is flat  Musculoskeletal:         General: Normal range of motion  Skin:     General: Skin is warm and dry  Neurological:      General: No focal deficit present        Mental Status: He is alert and oriented to person, place, " and time  Psychiatric:         Mood and Affect: Mood normal          Additional Data:     Labs:    Results from last 7 days   Lab Units 05/05/23  0503   WBC Thousand/uL 7 06   HEMOGLOBIN g/dL 9 5*   HEMATOCRIT % 29 9*   PLATELETS Thousands/uL 213   NEUTROS PCT % 83*   LYMPHS PCT % 7*   MONOS PCT % 9   EOS PCT % 0     Results from last 7 days   Lab Units 05/05/23  0503 05/03/23  0520 05/02/23  0453   POTASSIUM mmol/L 3 5   < > 4 3   CHLORIDE mmol/L 93*   < > 86*   CO2 mmol/L 40*   < > >45*   BUN mg/dL 19   < > 33*   CREATININE mg/dL 0 54*   < > 0 67   CALCIUM mg/dL 8 9   < > 9 0   ALK PHOS U/L  --   --  51   ALT U/L  --   --  20   AST U/L  --   --  25    < > = values in this interval not displayed  * I Have Reviewed All Lab Data Listed Above  * Additional Pertinent Lab Tests Reviewed:  All Labs Within Last 24 Hours Reviewed      Recent Cultures (last 7 days):           Last 24 Hours Medication List:   Current Facility-Administered Medications   Medication Dose Route Frequency Provider Last Rate   • aspirin  81 mg Oral Daily Naomi GIGI Izaguirre     • azithromycin  250 mg Oral Q24H Naomi GIGI Izaguirre     • budesonide  0 5 mg Nebulization Q12H Naomi GIGI Izaguirre     • cyanocobalamin  1,000 mcg Oral Daily Naomi GIGI Izaguirre     • enoxaparin  40 mg Subcutaneous Daily Naomi DANIEL IzaguirreC     • ergocalciferol  50,000 Units Oral Q28 Days Naomi GIGI Izaguirre     • formoterol  20 mcg Nebulization Q12H Naomi GIGI Izaguirre     • guaiFENesin  1,200 mg Oral Q12H [address] Naomi GIGI Izaguirre     • ipratropium  0 5 mg Nebulization Q6H Naomi GIGI Izaguirre     • levalbuterol  1 25 mg Nebulization Q6H Naomi DANIEL IzaguirreC     • levalbuterol  1 25 mg Nebulization Q6H PRN Hetul Roberson, DO     • melatonin  3 mg Oral HS Aden Velez PA-C     • methylPREDNISolone sodium succinate  40 mg Intravenous Q8H [address] Hetul Roberson, DO     • pravastatin  80 mg Oral Daily With GIGI Mckeon     • tamsulosin  0 4 mg Oral Daily With GIGI Mckeon     • torsemide  20 mg Oral Daily Natividad Mendoza PA-C          Today, Patient Was Seen By: Blanca Mon DO    ** Please Note: Dictation voice to text software may have been used in the creation of this document   **

## 2023-05-08 NOTE — RESPIRATORY THERAPY NOTE
Resp care   05/08/23 0718   Inhalation Therapy Tx   $ Inhalation Therapy Performed Yes   $ Pulse Oximetry Spot Check Charge Completed   SpO2 96 %   Pre-Treatment Pulse 76   Pre-Treatment Respirations 20   Duration 15   Breath Sounds Pre-Treatment Bilateral Diminished;Clear   Resp Comments pt found on 3L nc, bs are diminished, udn tx given   will continue to monitor per resp protocol

## 2023-05-08 NOTE — RESTORATIVE TECHNICIAN NOTE
Restorative Technician Note      Patient Name: Ani Boudreaux  Restorative Tech Visit Date: 05/08/23  Note Type: Mobility  Patient Position Upon Consult: Supine  Activity Performed: Ambulated; TAOMQNI; Stood  Assistive Device: Standard walker; Other (Comment) (chair follow to increase distance; 3L in room; 4-6L O2 while ambulating)  Education Provided: Yes  Patient Position at End of Consult: Bedside chair;  All needs within reach    Fransico GUNTERT, Restorative Technician

## 2023-05-08 NOTE — PLAN OF CARE
Problem: Nutrition/Hydration-ADULT  Goal: Nutrient/Hydration intake appropriate for improving, restoring or maintaining nutritional needs  Description: Monitor and assess patient's nutrition/hydration status for malnutrition  Collaborate with interdisciplinary team and initiate plan and interventions as ordered  Monitor patient's weight and dietary intake as ordered or per policy  Utilize nutrition screening tool and intervene as necessary  Determine patient's food preferences and provide high-protein, high-caloric foods as appropriate       INTERVENTIONS:  - Monitor oral intake, urinary output, labs, and treatment plans  - Assess nutrition and hydration status and recommend course of action  - Evaluate amount of meals eaten  - Assist patient with eating if necessary   - Allow adequate time for meals  - Recommend/ encourage appropriate diets, oral nutritional supplements, and vitamin/mineral supplements  - Order, calculate, and assess calorie counts as needed  - Recommend, monitor, and adjust tube feedings and TPN/PPN based on assessed needs  - Assess need for intravenous fluids  - Provide specific nutrition/hydration education as appropriate  - Include patient/family/caregiver in decisions related to nutrition  Outcome: Progressing     Problem: PAIN - ADULT  Goal: Verbalizes/displays adequate comfort level or baseline comfort level  Description: Interventions:  - Encourage patient to monitor pain and request assistance  - Assess pain using appropriate pain scale  - Administer analgesics based on type and severity of pain and evaluate response  - Implement non-pharmacological measures as appropriate and evaluate response  - Consider cultural and social influences on pain and pain management  - Notify physician/advanced practitioner if interventions unsuccessful or patient reports new pain  Outcome: Progressing     Problem: INFECTION - ADULT  Goal: Absence or prevention of progression during hospitalization  Description: INTERVENTIONS:  - Assess and monitor for signs and symptoms of infection  - Monitor lab/diagnostic results  - Monitor all insertion sites, i e  indwelling lines, tubes, and drains  - Monitor endotracheal if appropriate and nasal secretions for changes in amount and color  - [place] appropriate cooling/warming therapies per order  - Administer medications as ordered  - Instruct and encourage patient and family to use good hand hygiene technique  - Identify and instruct in appropriate isolation precautions for identified infection/condition  Outcome: Progressing  Goal: Absence of fever/infection during neutropenic period  Description: INTERVENTIONS:  - Monitor WBC    Outcome: Progressing     Problem: DISCHARGE PLANNING  Goal: Discharge to home or other facility with appropriate resources  Description: INTERVENTIONS:  - Identify barriers to discharge w/patient and caregiver  - Arrange for needed discharge resources and transportation as appropriate  - Identify discharge learning needs (meds, wound care, etc )  - Arrange for interpretive services to assist at discharge as needed  - Refer to Case Management Department for coordinating discharge planning if the patient needs post-hospital services based on physician/advanced practitioner order or complex needs related to functional status, cognitive ability, or social support system  Outcome: Progressing     Problem: Knowledge Deficit  Goal: Patient/family/caregiver demonstrates understanding of disease process, treatment plan, medications, and discharge instructions  Description: Complete learning assessment and assess knowledge base    Interventions:  - Provide teaching at level of understanding  - Provide teaching via preferred learning methods  Outcome: Progressing

## 2023-05-08 NOTE — PROGRESS NOTES
Progress Note - Pulmonary   Chrissy Galvan  79 y o  male MRN: [id number]  Unit/Bed#: [facility] 805-01 Encounter: [id number]      Assessment:  Acute on chronic hypoxic and hypercarbic respiratory failure  Severe End Stage COPD - GOLD Stage IV with acute exacerbation  Acute rhinovirus infection, resolved  Acute on chronic systolic CHF  Pulmonary cachexia and deconditioning      Plan:  · Developed an exacerbation over the weekend, however currently improved and without wheezing, can deescalate steroids  · Continue BIPAP QHS and prn for increased work of breathing  · Continue bronchodilators   · Continue azithromycin  · Encourage OOB to chair and ambulation     Subjective:   Patient reports wanting to go home  He states his breathing is at baseline  Denies fevers, chest pain, SOB  Objective:       Vitals: Blood pressure 101/56, pulse 87, temperature 98 2 °F (36 8 °C), resp  rate 14, height 5' (1 524 m), weight 50 5 kg (111 lb 4 8 oz), SpO2 96 %  , 3LNC, Body mass index is 21 74 kg/m²  Intake/Output Summary (Last 24 hours) at 5/8/2023 1519  Last data filed at 5/8/2023 1200  Gross per 24 hour   Intake 238 ml   Output 1550 ml   Net -1312 ml         Physical Exam  Physical Exam  Vitals and nursing note reviewed  Constitutional:       Comments: Chronically ill appearing, cachetic   HENT:      Head: Normocephalic and atraumatic  Right Ear: External ear normal       Left Ear: External ear normal       Nose: Nose normal       Mouth/Throat:      Mouth: Mucous membranes are moist       Pharynx: Oropharynx is clear  Eyes:      Conjunctiva/sclera: Conjunctivae normal    Cardiovascular:      Rate and Rhythm: Normal rate and regular rhythm  Pulses: Normal pulses  Heart sounds: Normal heart sounds  Pulmonary:      Effort: Pulmonary effort is normal       Comments: Diminished breath sounds  Abdominal:      General: Abdomen is flat  Bowel sounds are normal       Palpations: Abdomen is soft     Musculoskeletal: General: No swelling or tenderness  Cervical back: Neck supple  No muscular tenderness  Skin:     General: Skin is warm and dry  Capillary Refill: Capillary refill takes less than 2 seconds  Neurological:      Mental Status: He is alert and oriented to person, place, and time  Mental status is at baseline  Psychiatric:         Mood and Affect: Mood normal          Behavior: Behavior normal          Thought Content: Thought content normal          Judgment: Judgment normal          Labs: I have personally reviewed pertinent lab results    Laboratory and Diagnostics  Results from last 7 days   Lab Units 05/05/23  0503 05/04/23 0443 05/03/23  0520 05/02/23  0453   WBC Thousand/uL 7 06 6 54 6 70 4 13*   HEMOGLOBIN g/dL 9 5* 10 0* 9 8* 9 9*   HEMATOCRIT % 29 9* 32 8* 31 0* 31 0*   PLATELETS Thousands/uL 213 235 224 231   NEUTROS PCT % 83* 72 89* 86*   MONOS PCT % 9 13* 5 6     Results from last 7 days   Lab Units 05/05/23  0503 05/04/23 0443 05/03/23  0520 05/02/23  0453   SODIUM mmol/L 132* 133* 130* 130*   POTASSIUM mmol/L 3 5 3 6 4 2 4 3   CHLORIDE mmol/L 93* 90* 89* 86*   CO2 mmol/L 40* 43* 39* >45*   ANION GAP mmol/L -1* 0* 2*  --    BUN mg/dL 19 19 22 33*   CREATININE mg/dL 0 54* 0 56* 0 55* 0 67   CALCIUM mg/dL 8 9 8 6 8 6 9 0   GLUCOSE RANDOM mg/dL 91 77 201* 155*   ALT U/L  --   --   --  20   AST U/L  --   --   --  25   ALK PHOS U/L  --   --   --  51   ALBUMIN g/dL  --   --   --  2 8*   TOTAL BILIRUBIN mg/dL  --   --   --  0 49                                       Results from last 7 days   Lab Units 05/02/23  0453   PROCALCITONIN ng/ml 0 05       ABG:       Microbiology:  Rhinovirus +    Imaging and other studies: I have personally reviewed pertinent films in PACS  CXR 5/6/23: lungs are hyperinflated    Elaina Ward MD  Pulmonary/Critical Care Fellow PGY-IV  [facility]      Disclaimer: Portions of the record may have been created with voice "recognition software  Occasional wrong word or \"sound a like\" substitutions may have occurred due to the inherent limitations of voice recognition software  Careful consideration should be taken to recognize, using context, where substitutions have occurred      "

## 2023-05-08 NOTE — ASSESSMENT & PLAN NOTE
Malnutrition Findings:   Adult Malnutrition type: Chronic illness  Adult Degree of Malnutrition: Malnutrition of moderate degree  Malnutrition Characteristics: Muscle loss, Inadequate energy, Fluid accumulation                  360 Statement: Malnutrition of moderate degree in kari cointext of chronic illness related to SOB causing decreased intake as evidenced by severe depletion of muscle at clavicles, and mild depeption at Yarsanism  Treated with nutritional supplements    BMI Findings: Body mass index is 19 33 kg/m²       Continue Cardiac Diet

## 2023-05-08 NOTE — ASSESSMENT & PLAN NOTE
Patient is a level 3 DNR  For now he wishes to pursue aggressive medical management which would include pulmonary toilet bronchodilator therapy steroids BiPAP oxygen therapy and hospitalizations  Discussed with patient and his primary pulmonologist at bedside Dr Johnathan Lagunas  We will try to facilitate outpatient follow-up with palliative care  Wife asking for case management support for finances  Will discuss with case management

## 2023-05-08 NOTE — PROGRESS NOTES
Progress note - Palliative and Supportive Care   Raúl Kwon  79 y o  male [id number]    Patient Active Problem List   Diagnosis   • Nonobstructive atherosclerosis of coronary artery   • Nonischemic cardiomyopathy (HCC)   • Chronic obstructive pulmonary disease (HCC)   • Left bundle-branch block   • KEVIN and COPD overlap syndrome ([facility])   • Gastroesophageal reflux disease   • Impaired fasting glucose   • Osteoarthritis   • Osteoporosis   • Vitamin D deficiency   • Mood disorder (HCC)   • Other insomnia   • Macrocytosis without anemia   • Chronic respiratory failure with hypoxia and hypercapnia ([facility])   • Goals of care, counseling/discussion   • BPH with obstruction/lower urinary tract symptoms   • Prostate cancer ([facility] )   • Pulmonary nodules/lesions, multiple   • Protein-calorie malnutrition ([facility])   • Chronic HFrEF (heart failure with reduced ejection fraction) ([facility])   • Anemia   • Physical deconditioning   • Hyperglycemia   • COPD exacerbation ([facility])   • HLD (hyperlipidemia)     Active issues specifically addressed today include:   -Acute on chronic hypercapnic and hypoxic respiratory failure  -End-stage COPD  -Goals of care  -Palliative care patient       Plan:  1  Symptom management -    - No changes today  May consider low-dose opioid therapy when recovered to baseline respiratory status  2  Goals - Level 3 code status but agreeable to rehospitalizations  [facility] will sign off at this time but will arrange outpatient follow up  Discussed with primary and CM team   Please do not hesitate to reach out with questions or concerns, or should patient's clinical status deteriorate  Interval history:       Patient seen Mehul Goetz in chair  On NC  Still endorses SOB but overall markedly improved since admission  Discussed outpatient follow up with pt and spouse       MEDICATIONS / ALLERGIES:     all current active meds have been reviewed and current meds:   Current Facility-Administered Medications   Medication Dose Route Frequency   • aspirin chewable tablet 81 mg  81 mg Oral Daily   • azithromycin (ZITHROMAX) tablet 250 mg  250 mg Oral Q24H   • budesonide (PULMICORT) inhalation solution 0 5 mg  0 5 mg Nebulization Q12H   • cyanocobalamin (VITAMIN B-12) tablet 1,000 mcg  1,000 mcg Oral Daily   • enoxaparin (LOVENOX) subcutaneous injection 40 mg  40 mg Subcutaneous Daily   • ergocalciferol (VITAMIN D2) capsule 50,000 Units  50,000 Units Oral Q28 Days   • formoterol (PERFOROMIST) nebulizer solution 20 mcg  20 mcg Nebulization Q12H   • guaiFENesin (MUCINEX) 12 hr tablet 1,200 mg  1,200 mg Oral Q12H GABE   • ipratropium (ATROVENT) 0 02 % inhalation solution 0 5 mg  0 5 mg Nebulization Q6H   • levalbuterol (XOPENEX) inhalation solution 1 25 mg  1 25 mg Nebulization Q6H   • levalbuterol (XOPENEX) inhalation solution 1 25 mg  1 25 mg Nebulization Q6H PRN   • melatonin tablet 3 mg  3 mg Oral HS   • methylPREDNISolone sodium succinate (Solu-MEDROL) injection 40 mg  40 mg Intravenous Q8H [facility]   • pravastatin (PRAVACHOL) tablet 80 mg  80 mg Oral Daily With Dinner   • tamsulosin (FLOMAX) capsule 0 4 mg  0 4 mg Oral Daily With Dinner   • torsemide (DEMADEX) tablet 20 mg  20 mg Oral Daily       No Known Allergies    OBJECTIVE:    Physical Exam  Physical Exam  Vitals and nursing note reviewed  Constitutional:       General: He is not in acute distress  Appearance: He is ill-appearing  He is not toxic-appearing  HENT:      Head: Atraumatic  Right Ear: External ear normal       Left Ear: External ear normal       Nose:      Comments: O2 via NC  Eyes:      General:         Right eye: No discharge  Left eye: No discharge  Cardiovascular:      Rate and Rhythm: Normal rate  Pulmonary:      Comments: Overall calm, though borderline tachypneic  Musculoskeletal:         General: Deformity (diffuse muscle wasting) present  Skin:     General: Skin is warm and dry  Neurological:      General: No focal deficit present  Mental Status: He is alert  Mental status is at baseline  Psychiatric:         Mood and Affect: Mood normal          Lab Results: I have personally reviewed pertinent labs  , CBC: No results found for: WBC, HGB, HCT, MCV, PLT, ADJUSTEDWBC, MCH, MCHC, RDW, MPV, NRBC, CMP: No results found for: SODIUM, K, CL, CO2, ANIONGAP, BUN, CREATININE, GLUCOSE, CALCIUM, AST, ALT, ALKPHOS, PROT, BILITOT, EGFR      Counseling / Coordination of Care    Total floor / unit time spent today 20+ minutes  Greater than 50% of total time was spent with the patient and / or family counseling and / or coordination of care  A description of the counseling / coordination of care: patient assessment, med discussions, follow up discussion, confirmation of goals

## 2023-05-08 NOTE — CASE MANAGEMENT
Case Management Discharge Planning Note    Patient name Amy Vinson    Location [facility]05/[facility] 805-01 MRN [id number]  : 1955 Date 2023       Current Admission Date: 2023  Current Admission Diagnosis:COPD exacerbation [facility])   Patient Active Problem List    Diagnosis Date Noted   • HLD (hyperlipidemia) 2023   • COPD exacerbation ([facility] ) 2023   • Hyperglycemia 2023   • Physical deconditioning 2023   • Anemia 2023   • Chronic HFrEF (heart failure with reduced ejection fraction) ([facility] ) 2022   • Protein-calorie malnutrition ([place] [address] ) 2022   • Pulmonary nodules/lesions, multiple 2022   • Prostate cancer ([place] [address] ) 2021   • BPH with obstruction/lower urinary tract symptoms 2021   • Goals of care, counseling/discussion 2021   • Chronic respiratory failure with hypoxia and hypercapnia ([facility] ) 2020   • Macrocytosis without anemia 2019   • Other insomnia 2019   • Gastroesophageal reflux disease 2017   • Nonobstructive atherosclerosis of coronary artery 2016   • Mood disorder ([place] [address] ) 2015   • Impaired fasting glucose 2015   • Osteoporosis 10/14/2014   • Vitamin D deficiency 10/14/2014   • Nonischemic cardiomyopathy ([place] [address] ) 2014   • Left bundle-branch block 2013   • Osteoarthritis 2013   • KEVIN and COPD overlap syndrome ([facility] ) 2013   • Chronic obstructive pulmonary disease ([facility] ) 2012      LOS (days): 8  Geometric Mean LOS (GMLOS) (days): 3 90  Days to GMLOS:-3 7     OBJECTIVE:  Risk of Unplanned Readmission Score: 25 94         Current admission status: Inpatient   Preferred Pharmacy:   [id number]  [facility] [place] [address]  Phone: [phone] Fax: [phone]    Primary Care Provider: Jillian Cleveland DO    Primary Insurance: BLUE CROSS  Secondary Insurance: MEDICARE    DISCHARGE DETAILS:    Provided  expense assistance documents to wife                                                    Treatment Team Recommendation: Home  Discharge Destination Plan[de-identified] Home                                         Additional Comments: Provided wife Christina Alcantara Assistance documents CM had available

## 2023-05-09 ENCOUNTER — HOME HEALTH ADMISSION (OUTPATIENT)
Dept: HOME HEALTH SERVICES | Facility: HOME HEALTHCARE | Age: 68
End: 2023-05-09

## 2023-05-09 PROBLEM — E44.0 MODERATE PROTEIN-CALORIE MALNUTRITION (HCC): Status: ACTIVE | Noted: 2023-05-09

## 2023-05-09 RX ORDER — PREDNISONE 20 MG/1
40 TABLET ORAL DAILY
Status: DISCONTINUED | OUTPATIENT
Start: 2023-05-09 | End: 2023-05-09

## 2023-05-09 RX ORDER — PREDNISONE 20 MG/1
20 TABLET ORAL DAILY
Status: DISCONTINUED | OUTPATIENT
Start: 2023-05-16 | End: 2023-05-10 | Stop reason: HOSPADM

## 2023-05-09 RX ORDER — PREDNISONE 20 MG/1
40 TABLET ORAL DAILY
Status: DISCONTINUED | OUTPATIENT
Start: 2023-05-10 | End: 2023-05-10 | Stop reason: HOSPADM

## 2023-05-09 RX ORDER — PREDNISONE 10 MG/1
10 TABLET ORAL DAILY
Status: DISCONTINUED | OUTPATIENT
Start: 2023-05-19 | End: 2023-05-10 | Stop reason: HOSPADM

## 2023-05-09 RX ORDER — PREDNISONE 10 MG/1
10 TABLET ORAL DAILY
Status: DISCONTINUED | OUTPATIENT
Start: 2023-05-18 | End: 2023-05-09

## 2023-05-09 RX ORDER — PREDNISONE 20 MG/1
20 TABLET ORAL DAILY
Status: DISCONTINUED | OUTPATIENT
Start: 2023-05-15 | End: 2023-05-09

## 2023-05-09 RX ADMIN — MELATONIN 3 MG: at 21:04

## 2023-05-09 RX ADMIN — AZITHROMYCIN MONOHYDRATE 250 MG: 250 TABLET ORAL at 21:04

## 2023-05-09 RX ADMIN — GUAIFENESIN 1200 MG: 600 TABLET, EXTENDED RELEASE ORAL at 21:03

## 2023-05-09 RX ADMIN — TAMSULOSIN HYDROCHLORIDE 0.4 MG: 0.4 CAPSULE ORAL at 16:43

## 2023-05-09 RX ADMIN — IPRATROPIUM BROMIDE 0.5 MG: 0.5 SOLUTION RESPIRATORY (INHALATION) at 13:57

## 2023-05-09 RX ADMIN — IPRATROPIUM BROMIDE 0.5 MG: 0.5 SOLUTION RESPIRATORY (INHALATION) at 02:20

## 2023-05-09 RX ADMIN — BUDESONIDE 0.5 MG: 0.5 INHALANT ORAL at 19:08

## 2023-05-09 RX ADMIN — LEVALBUTEROL HYDROCHLORIDE 1.25 MG: 1.25 SOLUTION RESPIRATORY (INHALATION) at 19:08

## 2023-05-09 RX ADMIN — ASPIRIN 81 MG CHEWABLE TABLET 81 MG: 81 TABLET CHEWABLE at 08:16

## 2023-05-09 RX ADMIN — IPRATROPIUM BROMIDE 0.5 MG: 0.5 SOLUTION RESPIRATORY (INHALATION) at 19:08

## 2023-05-09 RX ADMIN — FORMOTEROL FUMARATE DIHYDRATE 20 MCG: 20 SOLUTION RESPIRATORY (INHALATION) at 07:31

## 2023-05-09 RX ADMIN — IPRATROPIUM BROMIDE 0.5 MG: 0.5 SOLUTION RESPIRATORY (INHALATION) at 07:08

## 2023-05-09 RX ADMIN — LEVALBUTEROL HYDROCHLORIDE 1.25 MG: 1.25 SOLUTION RESPIRATORY (INHALATION) at 13:57

## 2023-05-09 RX ADMIN — BUDESONIDE 0.5 MG: 0.5 INHALANT ORAL at 07:08

## 2023-05-09 RX ADMIN — FORMOTEROL FUMARATE DIHYDRATE 20 MCG: 20 SOLUTION RESPIRATORY (INHALATION) at 19:08

## 2023-05-09 RX ADMIN — PRAVASTATIN SODIUM 80 MG: 80 TABLET ORAL at 16:43

## 2023-05-09 RX ADMIN — LEVALBUTEROL HYDROCHLORIDE 1.25 MG: 1.25 SOLUTION RESPIRATORY (INHALATION) at 07:08

## 2023-05-09 RX ADMIN — ENOXAPARIN SODIUM 40 MG: 40 INJECTION SUBCUTANEOUS at 08:16

## 2023-05-09 RX ADMIN — CYANOCOBALAMIN TAB 500 MCG 1000 MCG: 500 TAB at 08:16

## 2023-05-09 RX ADMIN — GUAIFENESIN 1200 MG: 600 TABLET, EXTENDED RELEASE ORAL at 08:16

## 2023-05-09 RX ADMIN — METHYLPREDNISOLONE SODIUM SUCCINATE 40 MG: 40 INJECTION, POWDER, FOR SOLUTION INTRAMUSCULAR; INTRAVENOUS at 08:16

## 2023-05-09 RX ADMIN — LEVALBUTEROL HYDROCHLORIDE 1.25 MG: 1.25 SOLUTION RESPIRATORY (INHALATION) at 02:20

## 2023-05-09 NOTE — OCCUPATIONAL THERAPY NOTE
Occupational Therapy Evaluation     Patient Name: Juma Nichols  Today's Date: 5/9/2023  Problem List  Principal Problem:    COPD exacerbation ([facility] )  Active Problems:    Vitamin D deficiency    Mood disorder ([facility] )    Chronic respiratory failure with hypoxia and hypercapnia ([facility])    Goals of care, counseling/discussion    BPH with obstruction/lower urinary tract symptoms    Protein-calorie malnutrition (HCC)    Chronic HFrEF (heart failure with reduced ejection fraction) ([facility])    HLD (hyperlipidemia)    Moderate protein-calorie malnutrition ([facility] )    Past Medical History  Past Medical History:   Diagnosis Date   • Acute metabolic encephalopathy 6/57/1321   • Arthritis    • Bladder mass    • Cardiomyopathy [facility])    • Chest pain    • COPD (chronic obstructive pulmonary disease) ([facility])    • COVID-19 virus infection 2/23/2023   • CPAP (continuous positive airway pressure) dependence    • Emphysema of lung (HCC)    • Hypoxia     nocturnal   • Left bundle branch block    • Multiple pulmonary nodules     last assessed: 10/12/16   • Pneumonia    • Sleep apnea    • Sleep apnea, obstructive    • Smoker    • Weight loss 8/29/2019     Past Surgical History  Past Surgical History:   Procedure Laterality Date   • COLONOSCOPY     • CYSTOSCOPY     • CYSTOSCOPY  02/17/2021   • CO BLADDER INSTILLATION ANTICARCINOGENIC AGENT N/A 1/3/2020    Procedure: INSTILLATION MITOMYCIN;  Surgeon: Carltios Olea MD;  [facility];  Service: Urology   • CO CYSTO W/REMOVAL OF LESIONS SMALL N/A 1/3/2020    Procedure: TRANSURETHRAL RESECTION OF BLADDER TUMOR (TURBT);   Surgeon: Carlitos Olea MD;  [facility];  Service: Urology   • CO CYSTOURETHROSCOPY WITH BIOPSY N/A 4/13/2021    Procedure: Reinier Edward;  Surgeon: Carlitos Olea MD;  [facility];  Service: Urology   • CO TRURL ELECTROSURG RESCJ PROSTATE BLEED COMPLETE N/A 4/13/2021    Procedure: TRANSURETHRAL RESECTION OF PROSTATE (TURP) BLADDER "BIOPSY, FULGURATION;  Surgeon: Yessi Pickering MD;  [facility];  Service: Urology           05/09/23 0854   OT Last Visit   OT Visit Date 05/09/23   Note Type   Note type Evaluation   Pain Assessment   Pain Score No Pain   Restrictions/Precautions   Weight Bearing Precautions Per Order No   Other Precautions Cognitive; Chair Alarm;Multiple lines;Telemetry;O2;Fall Risk   Home Living   Type of [address] One level;Performs ADLs on one level;Stairs to enter with rails   Bathroom Shower/Tub Tub/shower unit   Bathroom Toilet Standard   Bathroom Equipment Grab bars in shower   Bathroom Accessibility Accessible   Home Equipment Walker;Cane;Wheelchair-manual   Additional Comments Pt lives in one level home with 3 CARLO, has above listed DME reports using RW PTA  Has home O2 reports 3-4 L at baseline   Prior Function   Level of [place] Independent with ADLs; Independent with functional mobility; Needs assistance with IADLS   Lives With Spouse; Son   [address] Help From Family   IADLs Independent with driving;Family/Friend/Other provides meals; Independent with medication management   Falls in the last 6 months 1 to 4   Vocational Retired   Lifestyle   Autonomy I with ADLS and funcitonal mobilty, has assist from wife with [place] reports +    Reciprocal Relationships supportive wife and 17 yo son however reports wife works FT and son is a FT student   Service to Others retired   Intrinsic Gratification Enjoys building models of [employer]ee[employer]   Subjective   Subjective \"I didn't know\"   ADL   Where [address] 5  [address] [facility] 4  [address] 2  Maximal Assistance   [address] 4  [address] 2  Maximal [address]  2  Maximal Assistance   Bed Mobility   Supine to Sit 5  Supervision   Additional items HOB elevated; Increased time required   Additional " Comments OOB at end of session   Transfers   Sit to Stand 5  Supervision   Additional items Assist x 1   Stand to Sit 5  Supervision   Additional items Assist x 1   Stand pivot 4  Minimal assistance   Additional items Assist x 1   Toilet transfer 4  Minimal assistance   Additional items Assist x 1   Additional Comments RW, increased time TC and VC for safety   Functional Mobility   Functional Mobility 4  Minimal assistance   Additional Comments increased time TC and VC   Additional items Rolling walker   Balance   Static Sitting Fair +   Dynamic Sitting Fair   Static Standing Fair -   Dynamic Standing Poor +   Ambulatory Poor +   Activity Tolerance   Activity Tolerance Patient limited by fatigue   Medical Staff Made Aware DPT, NSG aware   Nurse Made Aware yes   RUE Assessment   RUE Assessment WFL   LUE Assessment   LUE Assessment WFL   Psychosocial   Psychosocial (WDL) WDL   Cognition   Overall Cognitive Status Impaired   Arousal/Participation Alert; Responsive; Cooperative   Attention Attends with cues to redirect   Orientation Level Oriented X4   Memory Decreased recall of precautions;Decreased recall of recent events   Following Commands Follows one step commands with increased time or repetition   Comments Overall pleasant and cooperative, decreased insight and safety awareness noted  Pt incontinent of bowel and unaware of being soiled upon getting OOB   Assessment   Limitation Decreased ADL status; Decreased UE strength;Decreased Safe judgement during ADL;Decreased cognition;Decreased endurance;Decreased self-care trans;Decreased high-level ADLs   Prognosis Good   Assessment Pt is a 79 y o  male seen for OT evaluation s/p admit to [facility] on 4/30/2023 w/ COPD exacerbation ([facility] )    Comorbidities affecting pt's functional performance at time of assessment include:Acute metabolic encephalopathy, Arthritis, Bladder mass, Cardiomyopathy ([address] ), Chest pain, COPD (chronic obstructive pulmonary disease) ([facility] ), COVID-19 virus infection, CPAP (continuous positive airway pressure) dependence, Emphysema of lung ([address] ), Hypoxia, Left bundle branch block, Multiple pulmonary nodules, Pneumonia, Sleep apnea, Sleep apnea, obstructive, Smoker, and Weight loss  Personal factors affecting pt at time of IE include:steps to enter environment, limited home support, difficulty performing ADLS, difficulty performing IADLS , limited insight into deficits, flat affect, decreased initiation and engagement  and health management   Prior to admission, pt was I with ADLS and IADLS  Upon evaluation: Pt presents supine and is agreeable to OTIE  Pt on 3 L O2 via NC with the following vital signs: 94-86% at rest and with activity  Pt requires overall S- Min A for transfers and mobility and S- max A for self cares 2* the following deficits impacting occupational performance: weakness, decreased strength, decreased balance, decreased tolerance, impaired attention, impaired initiation, impaired sequencing, impaired problem solving, decreased safety awareness and decreased coping skills  Pt resting in chair at end of session with all needs in reach, alarm on, all lines in place and SCD's on  Pt to benefit from continued skilled OT tx while in the hospital to address deficits as defined above and maximize level of functional independence w ADL's and functional mobility  Occupational Performance areas to address include: grooming, bathing/shower, toilet hygiene, dressing, health maintenance, functional mobility, community mobility, clothing management and social participation  The patient's raw score on the AM-PAC Daily Activity inpatient short form is 17    , standardized score is 37 26  , greater than 39 4  Patients at this level are likely to benefit from discharge to home  Please refer to the recommendation of the Occupational Therapist for safe discharge planning     Goals   Patient Goals To go home   Plan   Treatment Interventions ADL retraining;UE strengthening/ROM; Endurance training;Functional transfer training;Cognitive reorientation;Patient/family training; Compensatory technique education;Continued evaluation; Energy conservation; Activityengagement   Goal Expiration Date 05/23/23   OT Treatment Day 1   OT Frequency 2-3x/wk   Recommendation   OT Discharge Recommendation Home with home health rehabilitation  (and increased supervision and support)   AM-PAC Daily Activity Inpatient   Lower Body Dressing 2   Bathing 2   Toileting 2   Upper Body Dressing 3   Grooming 4   Eating 4   Daily Activity Raw Score 17   Daily Activity Standardized Score (Calc for Raw Score >=11) 37 26   AM-PAC Applied Cognition Inpatient   Following a Speech/Presentation 3   Understanding Ordinary Conversation 4   Taking Medications 3   Remembering Where Things Are Placed or Put Away 3   Remembering List of 4-5 Errands 2   Taking Care of Complicated Tasks 2   Applied Cognition Raw Score 17   Applied Cognition Standardized Score 36 52   Additional Treatment Session   Start Time 0830   End Time 3073   Additional Treatment Day 1   Pt was seen for additional tx session this date focusing on carryover of techniques on self cares and functional transfers  Pt continues to require increased assist for self cares- unaware of bowel incontinence overall MAx A required for hygiene  Pt would benefit from continued OT tx to improve overall functional abilities  Will continue to follow with above listed POC       OT goals to be addressed in the next 14 days:    Pt will increase activity tolerance to G for 30 min txment sessions    Pt will complete UB/LB self care w/ mod I using adaptive device and DME as needed    Pt will complete toileting w/ mod I w/ G hygiene/thoroughness using DME as needed    Pt will improve functional transfers to Mod I on/off all surfaces using DME as needed w/ G balance/safety     Pt will improve functional mobility during ADL/IADL/leisure tasks to Mod I using DME as needed w/ G balance/safety     Pt will demonstrate G carryover of pt/caregiver education and training as appropriate  Pt will demonstrate 100% carryover of energy conservation techniques t/o functional I/ADL/leisure tasks w/o cues s/p skilled education    Pt will independently identify and utilize 2-3 coping strategies to increase positive affect and promote overall well-being      Pt will engage in ongoing cognitive assessment w/ G participation to assist w/ safe d/c planning/recommendations

## 2023-05-09 NOTE — CASE MANAGEMENT
Case Management Discharge Planning Note    Patient name Daniel Griffin    Location [facility] 805/[facility] 805-01 MRN [id number]  : 1955 Date 2023       Current Admission Date: 2023  Current Admission Diagnosis:COPD exacerbation [facility])   Patient Active Problem List    Diagnosis Date Noted   • Moderate protein-calorie malnutrition ([facility] ) 2023   • HLD (hyperlipidemia) 2023   • COPD exacerbation ([facility] ) 2023   • Hyperglycemia 2023   • Physical deconditioning 2023   • Anemia 2023   • Chronic HFrEF (heart failure with reduced ejection fraction) ([facility] ) 2022   • Protein-calorie malnutrition ([facility] ) 2022   • Pulmonary nodules/lesions, multiple 2022   • Prostate cancer ([place] [address] ) 2021   • BPH with obstruction/lower urinary tract symptoms 2021   • Goals of care, counseling/discussion 2021   • Chronic respiratory failure with hypoxia and hypercapnia ([facility] ) 2020   • Macrocytosis without anemia 2019   • Other insomnia 2019   • Gastroesophageal reflux disease 2017   • Nonobstructive atherosclerosis of coronary artery 2016   • Mood disorder ([facility] ) 2015   • Impaired fasting glucose 2015   • Osteoporosis 10/14/2014   • Vitamin D deficiency 10/14/2014   • Nonischemic cardiomyopathy ([facility] ) 2014   • Left bundle-branch block 2013   • Osteoarthritis 2013   • KEVIN and COPD overlap syndrome ([facility] ) 2013   • Chronic obstructive pulmonary disease ([facility] ) 2012      LOS (days): 9  Geometric Mean LOS (GMLOS) (days): 3 90  Days to GMLOS:-4 9     OBJECTIVE:  Risk of Unplanned Readmission Score: 27 13         Current admission status: Inpatient   Preferred Pharmacy:   [id number] [address] [place] [id number]  Phone: [phone] Fax: [phone]    Primary Care Provider: Dk Mcconnell DO    Primary Insurance: BLUE CROSS  Secondary Insurance: D/w son- labs stable; weight stable- kane saucedo MEDICARE    DISCHARGE DETAILS:    Discharge planning discussed with[de-identified] pt and wife at bedside  Freedom of Choice: Yes  Comments - Freedom of Choice: cm disucced hhc rec  CM contacted family/caregiver?: No- see comments  Were Treatment Team discharge recommendations reviewed with patient/caregiver?: Yes  Did patient/caregiver verbalize understanding of patient care needs?: Yes  Were patient/caregiver advised of the risks associated with not following Treatment Team discharge recommendations?: Yes  Contacts  Reason/Outcome: Discharge [address]         Is the patient interested in [place] [address] at discharge?: Yes  [address] requested[de-identified] Physical Therapy, Nursing, [address] [place] [address] Name[de-identified] [facility] Provider[de-identified] PCP  Home Health Services Needed[de-identified] Evaluate Functional Status and Safety  Homebound Criteria Met[de-identified] Requires the Assistance of Another Person for Safe Ambulation or to Leave the Home  Supporting Clincal Findings[de-identified] Fatigues Easliy in Short Distances  Treatment Team Recommendation: Home with [facility]  Discharge Destination Plan[de-identified] Home with [facility] discussed OT supervision recommendation  Per pt he does not want anyone in the home nor does he need it  Aren Evans stated that she is at home by 2pm and they live in a ranch home  Pt uses a walker, and able to get around on his own  Pt is also able to use home oxygen on his own  Pt and Aren Evans (wife) agreeable to home PT/OT  Wife is requesting nurse visits to check vitals  [facility] able to accept

## 2023-05-09 NOTE — PROGRESS NOTES
Progress Note - Pulmonary   Saqib Shah  79 y o  male MRN: [id number]  Unit/Bed#: [facility] 805-01 Encounter: [id number]      Assessment:  Acute on chronic hypoxic and hypercarbic respiratory failure  Severe End Stage COPD - GOLD Stage IV with acute exacerbation  Acute rhinovirus infection, resolved  Acute on chronic systolic CHF  Pulmonary cachexia and deconditioning        Plan:  • Improved this morning from this weekend  • Continue with pred taper starting tomorrow and to continue until on home prednisone  • Continue BIPAP QHS and prn for increased work of breathing  • Continue bronchodilators   • Continue azithromycin  • Encourage OOB to chair and ambulation   • Can discharge home tomorrow from pulm perspective      Subjective:   Patient reports wanting to go home  He denies increased SOB, fevers, chills  Objective:       Vitals: Blood pressure 107/57, pulse 86, temperature 98 4 °F (36 9 °C), resp  rate 18, height 5' (1 524 m), weight 50 5 kg (111 lb 4 8 oz), SpO2 94 %  , 3LNC, Body mass index is 21 74 kg/m²  Intake/Output Summary (Last 24 hours) at 5/9/2023 1454  Last data filed at 5/9/2023 1300  Gross per 24 hour   Intake 896 ml   Output 800 ml   Net 96 ml         Physical Exam  Physical Exam  Vitals and nursing note reviewed  Constitutional:       Comments: Chronically ill appearing, cachetic   HENT:      Head: Normocephalic and atraumatic  Right Ear: External ear normal       Left Ear: External ear normal       Nose: Nose normal       Mouth/Throat:      Mouth: Mucous membranes are moist       Pharynx: Oropharynx is clear  Eyes:      Conjunctiva/sclera: Conjunctivae normal    Cardiovascular:      Rate and Rhythm: Normal rate and regular rhythm  Pulses: Normal pulses  Heart sounds: Normal heart sounds  Pulmonary:      Effort: Pulmonary effort is normal       Comments: Diminished breath sounds throughout  Abdominal:      General: Abdomen is flat   Bowel sounds are normal  "Palpations: Abdomen is soft  Musculoskeletal:         General: No swelling or tenderness  Cervical back: Neck supple  No muscular tenderness  Skin:     General: Skin is warm and dry  Capillary Refill: Capillary refill takes less than 2 seconds  Neurological:      Mental Status: He is alert and oriented to person, place, and time  Mental status is at baseline  Psychiatric:         Mood and Affect: Mood normal          Behavior: Behavior normal          Thought Content: Thought content normal          Judgment: Judgment normal          Labs: I have personally reviewed pertinent lab results  Laboratory and Diagnostics  Results from last 7 days   Lab Units 05/05/23  0503 05/04/23  0443 05/03/23  0520   WBC Thousand/uL 7 06 6 54 6 70   HEMOGLOBIN g/dL 9 5* 10 0* 9 8*   HEMATOCRIT % 29 9* 32 8* 31 0*   PLATELETS Thousands/uL 213 235 224   NEUTROS PCT % 83* 72 89*   MONOS PCT % 9 13* 5     Results from last 7 days   Lab Units 05/05/23  0503 05/04/23  0443 05/03/23  0520   SODIUM mmol/L 132* 133* 130*   POTASSIUM mmol/L 3 5 3 6 4 2   CHLORIDE mmol/L 93* 90* 89*   CO2 mmol/L 40* 43* 39*   ANION GAP mmol/L -1* 0* 2*   BUN mg/dL 19 19 22   CREATININE mg/dL 0 54* 0 56* 0 55*   CALCIUM mg/dL 8 9 8 6 8 6   GLUCOSE RANDOM mg/dL 91 77 201*                                             ABG:       Microbiology:  Rhinovirus +     Imaging and other studies: I have personally reviewed pertinent films in PACS  CXR 5/6/23: lungs are hyperinflated      Darío Nguyen MD  Pulmonary/Critical Care Fellow PGY-IV  [facility]      Disclaimer: Portions of the record may have been created with voice recognition software  Occasional wrong word or \"sound a like\" substitutions may have occurred due to the inherent limitations of voice recognition software  Careful consideration should be taken to recognize, using context, where substitutions have occurred      "

## 2023-05-09 NOTE — ASSESSMENT & PLAN NOTE
· Patient is a level 3 DNR  · For now he wishes to pursue aggressive medical management which would include pulmonary toilet bronchodilator therapy steroids BiPAP oxygen therapy and hospitalizations  Discussed with patient and his primary pulmonologist at bedside Dr Jorge Singh  We will try to facilitate outpatient follow-up with palliative care  Wife asking for case management support for finances  Will discuss with case management

## 2023-05-09 NOTE — ASSESSMENT & PLAN NOTE
Wt Readings from Last 3 Encounters:   05/08/23 50 5 kg (111 lb 4 8 oz)   04/13/23 48 5 kg (107 lb)   03/27/23 46 3 kg (102 lb)     · Left Ventricle: Left ventricular cavity size is mildly dilated  Wall thickness is normal  The left ventricular ejection fraction is 20%  Systolic function is severely reduced  There is severe global hypokinesis with regional variation  Plan:  · Cont home torsemide  · Continue lopressor 12 5mg bid  · Add GDMT as tolerated  · Cardiology following; ? AICD candidate

## 2023-05-09 NOTE — PHYSICAL THERAPY NOTE
Physical Therapy Evaluation     Patient's Name: Jay Lo      Admitting Diagnosis  Shortness of breath [R06 02]  COPD exacerbation (HCC) [J44 1]  Acute on chronic systolic CHF (congestive heart failure) (HCC) [I50 23]  Acute on chronic respiratory failure with hypoxia and hypercapnia (HCC) [J96 21, J96 22]    Problem List  Patient Active Problem List   Diagnosis    Nonobstructive atherosclerosis of coronary artery    Nonischemic cardiomyopathy (HCC)    Chronic obstructive pulmonary disease (HCC)    Left bundle-branch block    KEVIN and COPD overlap syndrome (HCC)    Gastroesophageal reflux disease    Impaired fasting glucose    Osteoarthritis    Osteoporosis    Vitamin D deficiency    Mood disorder (HCC)    Other insomnia    Macrocytosis without anemia    Chronic respiratory failure with hypoxia and hypercapnia (HCC)    Goals of care, counseling/discussion    BPH with obstruction/lower urinary tract symptoms    Prostate cancer ([facility] )    Pulmonary nodules/lesions, multiple    Protein-calorie malnutrition (HCC)    Chronic HFrEF (heart failure with reduced ejection fraction) (HCC)    Anemia    Physical deconditioning    Hyperglycemia    COPD exacerbation (HCC)    HLD (hyperlipidemia)    Moderate protein-calorie malnutrition ([facility] )       Past Medical History  Past Medical History:   Diagnosis Date    Acute metabolic encephalopathy 2/44/2481    Arthritis     Bladder mass     Cardiomyopathy (HCC)     Chest pain     COPD (chronic obstructive pulmonary disease) ([facility] )     COVID-19 virus infection 2/23/2023    CPAP (continuous positive airway pressure) dependence     Emphysema of lung (HCC)     Hypoxia     nocturnal    Left bundle branch block     Multiple pulmonary nodules     last assessed: 10/12/16    Pneumonia     Sleep apnea     Sleep apnea, obstructive     Smoker     Weight loss 8/29/2019       Past Surgical History  Past Surgical History:   Procedure Laterality Date    COLONOSCOPY      CYSTOSCOPY      CYSTOSCOPY 02/17/2021    CA BLADDER INSTILLATION ANTICARCINOGENIC AGENT N/A 1/3/2020    Procedure: INSTILLATION MITOMYCIN;  Surgeon: Felecia Mcdonough MD;  [facility];  Service: Urology    CA CYSTO W/REMOVAL OF LESIONS SMALL N/A 1/3/2020    Procedure: TRANSURETHRAL RESECTION OF BLADDER TUMOR (TURBT); Surgeon: Fleecia Mcdonough MD;  [facility];  Service: Urology    CA CYSTOURETHROSCOPY WITH BIOPSY N/A 4/13/2021    Procedure: Sudeep Gooding;  Surgeon: Felecia Mcdonough MD;  [facility];  Service: Urology    CA TRURL ELECTROSURG [facility] N/A 4/13/2021    Procedure: TRANSURETHRAL RESECTION OF PROSTATE (TURP) BLADDER BIOPSY, FULGURATION;  Surgeon: Felecia Mcdonough MD;  [facility];  Service: Urology          05/09/23 0853   PT Last Visit   PT Visit Date 05/09/23   Note Type   Note type Evaluation   Education   Education Provided Yes   End of Consult   Patient Position at End of Consult Bedside chair; All needs within reach;Bed/Chair alarm activated   Pain Assessment   Pain Assessment Tool 0-10   Pain Score No Pain   Restrictions/Precautions   Weight Bearing Precautions Per Order No   Other Precautions Cognitive; Chair Alarm; Bed Alarm;O2;Fall Risk   Home Living   Type of [address] [place] [address] One level;Stairs to enter with rails  (3 CARLO)   Bathroom Shower/Tub Tub/shower unit   Bathroom Toilet Standard   Bathroom Equipment Grab bars in shower   Home Equipment Walker;Cane;Wheelchair-manual  (Home O2 3L-4 L)   Additional Comments Pt reports living in 1 STH , 3 CARLO   Prior Function   Level of [place] Independent with ADLs; Independent with functional mobility; Needs assistance with IADLS   Lives With Spouse; Son   Danny Help From Family   IADLs Independent with driving;Family/Friend/Other provides meals; Independent with medication management   Falls in the last 6 months 1 to 4   Vocational Retired   Comments Pt lives with spouse and son, spouse works during the day  Cognition   Arousal/Participation Responsive   Orientation Level Oriented X4   Following Commands Follows one step commands with increased time or repetition   Comments Pt alert and pleasant   Pt noted to be incontinent of bowel, unaware regarding situation and required assistance  RLE Assessment   RLE Assessment WFL   LLE Assessment   LLE Assessment WFL   Bed Mobility   Supine to Sit 5  Supervision   Additional items HOB elevated; Bedrails; Increased time required   Sit to Supine Unable to assess   Additional Comments Pt noted to be in bed upon arrival  Pt able to sit EOB with supervision  Transfers   Sit to Stand 5  Supervision   Additional items Assist x 1; Increased time required;Verbal cues   Stand to Sit 5  Supervision   Additional items Assist x 1; Increased time required;Verbal cues   Additional Comments with RW  Pt required VCs for safety   Ambulation/Elevation   Gait pattern Foward flexed; Excessively slow; Short stride   Gait Assistance 5  Supervision   Additional items Assist x 1;Verbal cues; Tactile cues   Assistive Device Rolling walker   Distance 12' in room   Ambulation/Elevation Additional Comments Pt ambulates in room, requiring VCs and TCs for safety  O2 sats on 3L O2 with activity to 86%, required to be increased to 4L to recover to 92% during activity  Balance   Static Sitting Fair +   Dynamic Sitting Fair   Static Standing Fair -   Dynamic Standing Poor +   Ambulatory Poor +   Endurance Deficit   Endurance Deficit Yes   Activity Tolerance   Activity Tolerance Patient limited by fatigue   Medical Staff Made Aware Co-eval with OT 2* medical complexity and multiple co morbidities   Nurse Made Aware RN cleared pt for therapy   Assessment   Prognosis Fair   Problem List Decreased endurance;Decreased mobility   Assessment Pt is a 79 y o  male seen for PT evaluation s/p admit to [facility] on 4/30/2023  Pt was admitted with a primary dx of: COPD exacerbation, Malnutrition    PT now consulted for assessment of mobility and d/c needs  Pts current comorbidities affecting treatment include: Chronic respiratory failure with hypoxia, BPH, Mood disorder, HLD, OA, Osteoporosis  Pts current clinical presentation is Unstable/ Unpredictable (high complexity) due to Ongoing medical management for primary dx, Increased reliance on more restrictive AD compared to baseline, Decreased activity tolerance compared to baseline, Fall risk, Cog status  Prior to admission, pt was Ind for mobility and ADLs, states ambulates household distances and has w/c for community mobility  Upon evaluation, pt currently is requiring S for bed mobility; for STS transfers; and for ambulation in room w/ RW, limited 2* endurance deficits ,SHARIF  Pt requires VC and TC during session and demonstrates insight deficits, noted to be incontinent of bowel and unaware of situation   Pt will need supervision for safety post d/c  Pt presents at PT eval functioning below baseline and currently w/ overall mobility deficits 2* to: decreased endurance, decreased activity tolerance compared to baseline, decreased functional mobility tolerance compared to baseline, fall risk, SOB upon exertion  Pt currently at a fall risk 2* to impairments listed above  Pt will continue to benefit from skilled acute PT interventions to address stated impairments; to maximize functional mobility; for ongoing pt/ family training; and DME needs  At conclusion of PT session chair alarm engaged, all needs in reach, RN notified of session findings/recommendations and pt returned back in recliner chair with phone and call bell within reach  Pt denies any further questions at this time  The patient's AM-PAC Basic Mobility Inpatient Short Form Raw Score is 17  A Raw score of greater than 16 suggests the patient may benefit from discharge to home  Please also refer to the recommendation of the Physical Therapist for safe discharge planning   Recommend HHPT with increased caregiver support with 24/7 supervision upon hospital D/C  Barriers to Discharge Decreased caregiver support   Goals   Patient Goals to go home   STG Expiration Date 05/23/23   Short Term Goal #1 STG 1  Pt will be able to perform bed mobility tasks with mOD iND in order to improve overall functional mobility and assist in safe d/c  STG 2  Pt with sit EOB for at least 25 minutes at iND level in order to strengthen abdominal musculature and assist in future transfers/ ambulation  STG 3  Pt will be able to perform functional transfer with Mod Ind in order to improve overall functional mobility and assist in safe d/c  STG 4  Pt will be able to ambulate at least 50  feet with least restrictive device with S A in order to improve overall functional mobility and assist in safe d/c  STG 5  Pt will improve sitting/standing static/dynamic balance 1/2 grade in order to improve functional mobility and assist in safe d/c  STG 6  Pt will improve LE strength by 1/2 grade in order to improve functional mobility and assist in safe d/c  STG 7   Pt will be able to negotiate at least 3 stairs with least restrictive device with S A in order to improve overall functional mobility and assist in safe d/c    PT Treatment Day 0   Plan   Treatment/Interventions Spoke to nursing;OT;Elevations;Gait training;Patient/family training   PT Frequency 2-3x/wk   Recommendation   PT Discharge Recommendation   (HHPT with increased caregiver support, will need supervision post d c for safety)   Equipment Recommended Walker   AM-PAC Basic Mobility Inpatient   Turning in Flat Bed Without Bedrails 3   Lying on Back to Sitting on Edge of Flat Bed Without Bedrails 3   Moving Bed to Chair 3   Standing Up From Chair Using Arms 3   Walk in Room 3   Climb 3-5 Stairs With Railing 2   Basic Mobility Inpatient Raw Score 17   Basic Mobility Standardized Score 39 67   Highest Level Of Mobility   JH-HLM Goal 5: Stand one or more mins   JH-HLM Achieved 6: Walk 10 steps or more   Modified Joes Scale   Modified Rosio Scale 4   Additional Treatment Session   Start Time 9674   End Time 0128   Treatment Assessment Pt seen for addidtional treatment session  Pt required assistance for ambulating from bathroom  Able to complete with assist X 1 using RW, mobility limited 2* endurance deficits  Pt required assistance with hygiene care and ADLs, able to maintain standing ~ 2 minuts to complete pericare with OT, PT focused on standing balance+ endurance training to promote improving functional mobility  Post d/c rec is HHPT with increased caregiver support , will need supervision post d/c for safety           Marlee Rush, PT DPT

## 2023-05-09 NOTE — PLAN OF CARE
Problem: Nutrition/Hydration-ADULT  Goal: Nutrient/Hydration intake appropriate for improving, restoring or maintaining nutritional needs  Description: Monitor and assess patient's nutrition/hydration status for malnutrition  Collaborate with interdisciplinary team and initiate plan and interventions as ordered  Monitor patient's weight and dietary intake as ordered or per policy  Utilize nutrition screening tool and intervene as necessary  Determine patient's food preferences and provide high-protein, high-caloric foods as appropriate       INTERVENTIONS:  - Monitor oral intake, urinary output, labs, and treatment plans  - Assess nutrition and hydration status and recommend course of action  - Evaluate amount of meals eaten  - Assist patient with eating if necessary   - Allow adequate time for meals  - Recommend/ encourage appropriate diets, oral nutritional supplements, and vitamin/mineral supplements  - Order, calculate, and assess calorie counts as needed  - Recommend, monitor, and adjust tube feedings and TPN/PPN based on assessed needs  - Assess need for intravenous fluids  - Provide specific nutrition/hydration education as appropriate  - Include patient/family/caregiver in decisions related to nutrition  Outcome: Progressing     Problem: PAIN - ADULT  Goal: Verbalizes/displays adequate comfort level or baseline comfort level  Description: Interventions:  - Encourage patient to monitor pain and request assistance  - Assess pain using appropriate pain scale  - Administer analgesics based on type and severity of pain and evaluate response  - Implement non-pharmacological measures as appropriate and evaluate response  - Consider cultural and social influences on pain and pain management  - Notify physician/advanced practitioner if interventions unsuccessful or patient reports new pain  Outcome: Progressing     Problem: INFECTION - ADULT  Goal: Absence or prevention of progression during hospitalization  Description: INTERVENTIONS:  - Assess and monitor for signs and symptoms of infection  - Monitor lab/diagnostic results  - Monitor all insertion sites, i e  indwelling lines, tubes, and drains  - Monitor endotracheal if appropriate and nasal secretions for changes in amount and color  - [place] appropriate cooling/warming therapies per order  - Administer medications as ordered  - Instruct and encourage patient and family to use good hand hygiene technique  - Identify and instruct in appropriate isolation precautions for identified infection/condition  Outcome: Progressing  Goal: Absence of fever/infection during neutropenic period  Description: INTERVENTIONS:  - Monitor WBC    Outcome: Progressing     Problem: DISCHARGE PLANNING  Goal: Discharge to home or other facility with appropriate resources  Description: INTERVENTIONS:  - Identify barriers to discharge w/patient and caregiver  - Arrange for needed discharge resources and transportation as appropriate  - Identify discharge learning needs (meds, wound care, etc )  - Arrange for interpretive services to assist at discharge as needed  - Refer to Case Management Department for coordinating discharge planning if the patient needs post-hospital services based on physician/advanced practitioner order or complex needs related to functional status, cognitive ability, or social support system  Outcome: Progressing     Problem: Knowledge Deficit  Goal: Patient/family/caregiver demonstrates understanding of disease process, treatment plan, medications, and discharge instructions  Description: Complete learning assessment and assess knowledge base    Interventions:  - Provide teaching at level of understanding  - Provide teaching via preferred learning methods  Outcome: Progressing

## 2023-05-09 NOTE — ASSESSMENT & PLAN NOTE
· PFTs 2020: FEV1/FVC: 30%, FVC: 2L, 59% predicted, FEV1: 0 6L 22% predicted, %, %, DLCO 45%    · Continue Xopenex/Atrovent nebs q6  · Continue home pulmicort and formeterol  · Transition from IV steroids to prednisone taper today  · Continue BiPAP qhs  · Azithromycin 250 mg 2-3x per week  · Anticipate transition home tmw 5/10 outpatient pulmonology and palliative care follow-up

## 2023-05-09 NOTE — PROGRESS NOTES
[facility]  Progress Note  Name: Sangita Daly  MRN: [id number]  Unit/Bed#: [facility]PHP 805-01 I Date of Admission: 4/30/2023   Date of Service: 5/9/2023 I Hospital Day: 9    Assessment/Plan   * COPD exacerbation ([facility] )  Assessment & Plan  · PFTs 2020: FEV1/FVC: 30%, FVC: 2L, 59% predicted, FEV1: 0 6L 22% predicted, %, %, DLCO 45%    · Continue Xopenex/Atrovent nebs q6  · Continue home pulmicort and formeterol  · Transition from IV steroids to prednisone taper today  · Continue BiPAP qhs  · Azithromycin 250 mg 2-3x per week  · Anticipate transition home tmw 5/10 outpatient pulmonology and palliative care follow-up  Chronic HFrEF (heart failure with reduced ejection fraction) ([facility])  Assessment & Plan  Wt Readings from Last 3 Encounters:   05/08/23 50 5 kg (111 lb 4 8 oz)   04/13/23 48 5 kg (107 lb)   03/27/23 46 3 kg (102 lb)     · Left Ventricle: Left ventricular cavity size is mildly dilated  Wall thickness is normal  The left ventricular ejection fraction is 20%  Systolic function is severely reduced  There is severe global hypokinesis with regional variation  Plan:  · Cont home torsemide  · Continue lopressor 12 5mg bid  · Add GDMT as tolerated  · Cardiology following; ? AICD candidate        Chronic respiratory failure with hypoxia and hypercapnia ([facility])  Assessment & Plan  No results for input(s): Elroy Leung in the last 72 hours    See A/P under COPD exacerbation    Moderate protein-calorie malnutrition ([facility] )  Assessment & Plan  Malnutrition Findings:   Adult Malnutrition type: Chronic illness  Adult Degree of Malnutrition: Malnutrition of moderate degree  Malnutrition Characteristics: Muscle loss, Inadequate energy, Fluid accumulation                  360 Statement: Malnutrition of moderate degree in kari cointext of chronic illness related to SOB causing decreased intake as evidenced by severe depletion of muscle at clavicles, and mild depeption at [place]  Treated with nutritional supplements    BMI Findings: Body mass index is 21 74 kg/m²  Protein-calorie malnutrition ([facility] )  Assessment & Plan  Malnutrition Findings:   Adult Malnutrition type: Chronic illness  Adult Degree of Malnutrition: Malnutrition of moderate degree  Malnutrition Characteristics: Muscle loss, Inadequate energy, Fluid accumulation                  360 Statement: Malnutrition of moderate degree in [facility] cointext of chronic illness related to SOB causing decreased intake as evidenced by severe depletion of muscle at clavicles, and mild depeption at [place]  Treated with nutritional supplements    BMI Findings: Body mass index is 21 74 kg/m²  Continue Cardiac Diet      BPH with obstruction/lower urinary tract symptoms  Assessment & Plan  · Continue home flomax    Goals of care, counseling/discussion  Assessment & Plan  · Patient is a level 3 DNR  · For now he wishes to pursue aggressive medical management which would include pulmonary toilet bronchodilator therapy steroids BiPAP oxygen therapy and hospitalizations  Discussed with patient and his primary pulmonologist at bedside Dr Arturo Lamas  We will try to facilitate outpatient follow-up with palliative care  Wife asking for case management support for finances  Will discuss with case management  Mood disorder ([facility] )  Assessment & Plan  · Continue home Lexapro     Vitamin D deficiency  Assessment & Plan  · Continue home D2        VTE Pharmacologic Prophylaxis: VTE Score: 7 High Risk (Score >/= 5) - Pharmacological DVT Prophylaxis Ordered: enoxaparin (Lovenox)  Sequential Compression Devices Ordered  Patient Centered Rounds: I performed bedside rounds with nursing staff today  Discussions with Specialists or Other Care Team Provider: Steven  Pulmonology  Education and Discussions with Family / Patient: Updated  (wife and son) at bedside      Total Time Spent on Date of Encounter in care of patient: 45 minutes This time was spent on one or more of the following: performing physical exam; counseling and coordination of care; obtaining or reviewing history; documenting in the medical record; reviewing/ordering tests, medications or procedures; communicating with other healthcare professionals and discussing with patient's family/caregivers  Current Length of Stay: 9 day(s)  Current Patient Status: Inpatient   Certification Statement: The patient will continue to require additional inpatient hospital stay due to respiratory failure  Discharge Plan: Anticipate discharge tomorrow to home with home services  Code Status: Level 3 - DNAR and DNI    Subjective:   Patient seen and examined  Really just wants to go home  Respiratory status appears stable on current oxygen regimen  No other new complaints  Objective:     Vitals:   Temp (24hrs), Av 6 °F (37 °C), Min:98 4 °F (36 9 °C), Max:98 7 °F (37 1 °C)    Temp:  [98 4 °F (36 9 °C)-98 7 °F (37 1 °C)] 98 4 °F (36 9 °C)  HR:  [83-86] 86  Resp:  [18] 18  BP: ()/(50-57) 107/57  SpO2:  [94 %-99 %] 94 %  Body mass index is 21 74 kg/m²  Input and Output Summary (last 24 hours): Intake/Output Summary (Last 24 hours) at 2023 1546  Last data filed at 2023 1300  Gross per 24 hour   Intake 896 ml   Output 800 ml   Net 96 ml       PHYSICAL EXAM:    Vitals signs reviewed  Constitutional   Awake and cooperative  NAD  Chronically ill-appearing  Cachectic  Head/Neck   Normocephalic  Atraumatic  HEENT   No scleral icterus  EOMI  Heart   Regular rate and rhythm  No murmers  Lungs  prolonged expiration  Decreased movement throughout  Respirations mildly labored  No wheezing or rhonchi  Abdomen   Soft  Nontender  Nondistended  Skin   Skin color normal  No rashes  Extremities   No deformities  No peripheral edema  Neuro   Alert and oriented  No new deficits  Psych   Mood stable   Affect normal          Additional Data:     Labs:  Results from last 7 days   Lab Units 05/05/23  0503   WBC Thousand/uL 7 06   HEMOGLOBIN g/dL 9 5*   HEMATOCRIT % 29 9*   PLATELETS Thousands/uL 213   NEUTROS PCT % 83*   LYMPHS PCT % 7*   MONOS PCT % 9   EOS PCT % 0     Results from last 7 days   Lab Units 05/05/23  0503   SODIUM mmol/L 132*   POTASSIUM mmol/L 3 5   CHLORIDE mmol/L 93*   CO2 mmol/L 40*   BUN mg/dL 19   CREATININE mg/dL 0 54*   ANION GAP mmol/L -1*   CALCIUM mg/dL 8 9   GLUCOSE RANDOM mg/dL 91                       Lines/Drains:  Invasive Devices     Peripheral Intravenous Line  Duration           Peripheral IV 05/06/23 Right;Ventral (anterior) Forearm 3 days          Drain  Duration           External Urinary Catheter Small 3 days                      Imaging: No pertinent imaging reviewed      Recent Cultures (last 7 days):         Last 24 Hours Medication List:   Current Facility-Administered Medications   Medication Dose Route Frequency Provider Last Rate   • aspirin  81 mg Oral Daily Donaldo Connor PA-C     • azithromycin  250 mg Oral Q24H Ronansusan Connor PA-C     • budesonide  0 5 mg Nebulization Q12H Ronansusan Connor PA-C     • cyanocobalamin  1,000 mcg Oral Daily Ronanrysusan Connor PA-C     • enoxaparin  40 mg Subcutaneous Daily Ronanrysusan Connor PA-C     • ergocalciferol  50,000 Units Oral Q28 Days Donaldo Connor PA-C     • formoterol  20 mcg Nebulization Q12H Ronanrysusan Connor PA-C     • guaiFENesin  1,200 mg Oral Q12H [address] Ronanrysusan Connor PA-C     • ipratropium  0 5 mg Nebulization Q6H Ronanrysusan Connor PA-C     • levalbuterol  1 25 mg Nebulization Q6H Sherrysusan Connor PA-C     • levalbuterol  1 25 mg Nebulization Q6H PRN Imani Roberson DO     • melatonin  3 mg Oral HS Aden Velez PA-C     • pravastatin  80 mg Oral Daily With GIGI Mckeon     • [START ON 5/10/2023] predniSONE  40 mg Oral Daily William Bauer MD      Followed by   • [START ON 5/13/2023] predniSONE  30 mg Oral Daily Antoine Perez MD      Followed by   • [START ON 5/16/2023] predniSONE  20 mg Oral Daily Antoine Perez MD      Followed by   • [START ON 5/19/2023] predniSONE  10 mg Oral Daily Antoine Perez MD     • tamsulosin  0 4 mg Oral Daily With Dinner Antonio Pelaez PA-C     • torsemide  20 mg Oral Daily Antonio Pelaez PA-C          Today, Patient Was Seen By: Alycia Spencer DO    **Please Note: This note may have been constructed using a voice recognition system  **

## 2023-05-09 NOTE — ASSESSMENT & PLAN NOTE
No results for input(s): Juan Chacon, Alvaro Ruiz in the last 72 hours    See A/P under COPD exacerbation

## 2023-05-09 NOTE — PLAN OF CARE
Problem: OCCUPATIONAL THERAPY ADULT  Goal: Performs self-care activities at highest level of function for planned discharge setting  See evaluation for individualized goals  Description: Treatment Interventions: ADL retraining, UE strengthening/ROM, Endurance training, Functional transfer training, Cognitive reorientation, Patient/family training, Compensatory technique education, Continued evaluation, Energy conservation, Activityengagement          See flowsheet documentation for full assessment, interventions and recommendations  Note: Limitation: Decreased ADL status, Decreased UE strength, Decreased Safe judgement during ADL, Decreased cognition, Decreased endurance, Decreased self-care trans, Decreased high-level ADLs  Prognosis: Good  Assessment: Pt is a 79 y o  male seen for OT evaluation s/p admit to [facility] on 4/30/2023 w/ COPD exacerbation ([facility] )  Comorbidities affecting pt's functional performance at time of assessment include:Acute metabolic encephalopathy, Arthritis, Bladder mass, Cardiomyopathy (HCC), Chest pain, COPD (chronic obstructive pulmonary disease) ([facility] ), COVID-19 virus infection, CPAP (continuous positive airway pressure) dependence, Emphysema of lung ([facility] ), Hypoxia, Left bundle branch block, Multiple pulmonary nodules, Pneumonia, Sleep apnea, Sleep apnea, obstructive, Smoker, and Weight loss  Personal factors affecting pt at time of IE include:steps to enter environment, limited home support, difficulty performing ADLS, difficulty performing IADLS , limited insight into deficits, flat affect, decreased initiation and engagement  and health management   Prior to admission, pt was I with ADLS and IADLS  Upon evaluation: Pt presents supine and is agreeable to OTIE  Pt on 3 L O2 via NC with the following vital signs: 94-86% at rest and with activity   Pt requires overall S- Min A for transfers and mobility and S- max A for self cares 2* the following deficits impacting occupational performance: weakness, decreased strength, decreased balance, decreased tolerance, impaired attention, impaired initiation, impaired sequencing, impaired problem solving, decreased safety awareness and decreased coping skills  Pt resting in chair at end of session with all needs in reach, alarm on, all lines in place and SCD's on  Pt to benefit from continued skilled OT tx while in the hospital to address deficits as defined above and maximize level of functional independence w ADL's and functional mobility  Occupational Performance areas to address include: grooming, bathing/shower, toilet hygiene, dressing, health maintenance, functional mobility, community mobility, clothing management and social participation  The patient's raw score on the AM-PAC Daily Activity inpatient short form is 17    , standardized score is 37 26  , greater than 39 4  Patients at this level are likely to benefit from discharge to home  Please refer to the recommendation of the Occupational Therapist for safe discharge planning       OT Discharge Recommendation: Home with home health rehabilitation (and increased supervision and support)

## 2023-05-09 NOTE — PLAN OF CARE
Problem: PHYSICAL THERAPY ADULT  Goal: Performs mobility at highest level of function for planned discharge setting  See evaluation for individualized goals  Description: Treatment/Interventions: Spoke to nursing, OT, Elevations, Gait training, Patient/family training  Equipment Recommended: Naz Murphy       See flowsheet documentation for full assessment, interventions and recommendations  Note: Prognosis: Fair  Problem List: Decreased endurance, Decreased mobility  Assessment: Pt is a 79 y o  male seen for PT evaluation s/p admit to [facility] on 4/30/2023  Pt was admitted with a primary dx of: COPD exacerbation, Malnutrition  PT now consulted for assessment of mobility and d/c needs  Pts current comorbidities affecting treatment include: Chronic respiratory failure with hypoxia, BPH, Mood disorder, HLD, OA, Osteoporosis  Pts current clinical presentation is Unstable/ Unpredictable (high complexity) due to Ongoing medical management for primary dx, Increased reliance on more restrictive AD compared to baseline, Decreased activity tolerance compared to baseline, Fall risk, Cog status  Prior to admission, pt was Ind for mobility and ADLs, states ambulates household distances and has w/c for community mobility  Upon evaluation, pt currently is requiring S for bed mobility; for STS transfers; and for ambulation in room w/ RW, limited 2* endurance deficits ,SHARIF  Pt requires VC and TC during session and demonstrates insight deficits, noted to be incontinent of bowel and unaware of situation   Pt will need supervision for safety post d/c  Pt presents at PT eval functioning below baseline and currently w/ overall mobility deficits 2* to: decreased endurance, decreased activity tolerance compared to baseline, decreased functional mobility tolerance compared to baseline, fall risk, SOB upon exertion  Pt currently at a fall risk 2* to impairments listed above    Pt will continue to benefit from skilled acute PT interventions to address stated impairments; to maximize functional mobility; for ongoing pt/ family training; and DME needs  At conclusion of PT session chair alarm engaged, all needs in reach, RN notified of session findings/recommendations and pt returned back in recliner chair with phone and call bell within reach  Pt denies any further questions at this time  The patient's AM-PAC Basic Mobility Inpatient Short Form Raw Score is 17  A Raw score of greater than 16 suggests the patient may benefit from discharge to home  Please also refer to the recommendation of the Physical Therapist for safe discharge planning  Recommend HHPT with increased caregiver support with 24/7 supervision upon hospital D/C  Barriers to Discharge: Decreased caregiver support     PT Discharge Recommendation:  (HHPT with increased caregiver support, will need supervision post d c for safety)    See flowsheet documentation for full assessment

## 2023-05-09 NOTE — ASSESSMENT & PLAN NOTE
Malnutrition Findings:   Adult Malnutrition type: Chronic illness  Adult Degree of Malnutrition: Malnutrition of moderate degree  Malnutrition Characteristics: Muscle loss, Inadequate energy, Fluid accumulation                  360 Statement: Malnutrition of moderate degree in kari cointext of chronic illness related to SOB causing decreased intake as evidenced by severe depletion of muscle at clavicles, and mild depeption at [place]  Treated with nutritional supplements    BMI Findings: Body mass index is 21 74 kg/m²       Continue Cardiac Diet

## 2023-05-09 NOTE — ASSESSMENT & PLAN NOTE
Malnutrition Findings:   Adult Malnutrition type: Chronic illness  Adult Degree of Malnutrition: Malnutrition of moderate degree  Malnutrition Characteristics: Muscle loss, Inadequate energy, Fluid accumulation                  360 Statement: Malnutrition of moderate degree in kari cointext of chronic illness related to SOB causing decreased intake as evidenced by severe depletion of muscle at clavicles, and mild depeption at [place]  Treated with nutritional supplements    BMI Findings: Body mass index is 21 74 kg/m²

## 2023-05-10 VITALS
BODY MASS INDEX: 22.7 KG/M2 | RESPIRATION RATE: 22 BRPM | WEIGHT: 115.6 LBS | OXYGEN SATURATION: 96 % | TEMPERATURE: 97.7 F | HEART RATE: 99 BPM | SYSTOLIC BLOOD PRESSURE: 112 MMHG | HEIGHT: 60 IN | DIASTOLIC BLOOD PRESSURE: 57 MMHG

## 2023-05-10 DIAGNOSIS — J43.9 PULMONARY EMPHYSEMA, UNSPECIFIED EMPHYSEMA TYPE (HCC): Chronic | ICD-10-CM

## 2023-05-10 RX ORDER — PREDNISONE 10 MG/1
TABLET ORAL
Qty: 26 TABLET | Refills: 0 | Status: SHIPPED | OUTPATIENT
Start: 2023-05-10 | End: 2023-05-21

## 2023-05-10 RX ORDER — BUDESONIDE 0.5 MG/2ML
INHALANT ORAL
Qty: 360 ML | Refills: 2 | Status: SHIPPED | OUTPATIENT
Start: 2023-05-10

## 2023-05-10 RX ORDER — AZITHROMYCIN 250 MG/1
250 TABLET, FILM COATED ORAL 3 TIMES WEEKLY
Qty: 12 TABLET | Refills: 0 | Status: SHIPPED | OUTPATIENT
Start: 2023-05-10 | End: 2023-06-09

## 2023-05-10 RX ADMIN — IPRATROPIUM BROMIDE 0.5 MG: 0.5 SOLUTION RESPIRATORY (INHALATION) at 01:38

## 2023-05-10 RX ADMIN — BUDESONIDE 0.5 MG: 0.5 INHALANT ORAL at 07:28

## 2023-05-10 RX ADMIN — FORMOTEROL FUMARATE DIHYDRATE 20 MCG: 20 SOLUTION RESPIRATORY (INHALATION) at 07:30

## 2023-05-10 RX ADMIN — LEVALBUTEROL HYDROCHLORIDE 1.25 MG: 1.25 SOLUTION RESPIRATORY (INHALATION) at 07:28

## 2023-05-10 RX ADMIN — CYANOCOBALAMIN TAB 500 MCG 1000 MCG: 500 TAB at 08:49

## 2023-05-10 RX ADMIN — TORSEMIDE 20 MG: 20 TABLET ORAL at 08:49

## 2023-05-10 RX ADMIN — ASPIRIN 81 MG CHEWABLE TABLET 81 MG: 81 TABLET CHEWABLE at 08:49

## 2023-05-10 RX ADMIN — IPRATROPIUM BROMIDE 0.5 MG: 0.5 SOLUTION RESPIRATORY (INHALATION) at 07:28

## 2023-05-10 RX ADMIN — PREDNISONE 40 MG: 20 TABLET ORAL at 08:50

## 2023-05-10 RX ADMIN — GUAIFENESIN 1200 MG: 600 TABLET, EXTENDED RELEASE ORAL at 08:49

## 2023-05-10 RX ADMIN — ENOXAPARIN SODIUM 40 MG: 40 INJECTION SUBCUTANEOUS at 08:49

## 2023-05-10 RX ADMIN — LEVALBUTEROL HYDROCHLORIDE 1.25 MG: 1.25 SOLUTION RESPIRATORY (INHALATION) at 01:37

## 2023-05-10 NOTE — PLAN OF CARE
Problem: MOBILITY - ADULT  Goal: Maintain or return to baseline ADL function  Description: INTERVENTIONS:  -  Assess patient's ability to carry out ADLs; assess patient's baseline for ADL function and identify physical deficits which impact ability to perform ADLs (bathing, care of mouth/teeth, toileting, grooming, dressing, etc )  - Assess/evaluate cause of self-care deficits   - Assess range of motion  - Assess patient's mobility; develop plan if impaired  - Assess patient's need for assistive devices and provide as appropriate  - Encourage maximum independence but intervene and supervise when necessary  - Involve family in performance of ADLs  - Assess for home care needs following discharge   - Consider OT consult to assist with ADL evaluation and planning for discharge  - Provide patient education as appropriate  Outcome: Progressing  Goal: Maintains/Returns to pre admission functional level  Description: INTERVENTIONS:  - Perform BMAT or MOVE assessment daily    - Set and communicate daily mobility goal to care team and patient/family/caregiver  - Collaborate with rehabilitation services on mobility goals if consulted  - Reposition patient every 2 hours    - Stand patient 2 times a day  - Ambulate patient 2 times a day  - Out of bed to chair 2 times a day   - Out of bed for meals 2 times a day  - Out of bed for toileting  - Record patient progress and toleration of activity level   Outcome: Progressing     Problem: Prexisting or High Potential for Compromised Skin Integrity  Goal: Skin integrity is maintained or improved  Description: INTERVENTIONS:  - Identify patients at risk for skin breakdown  - Assess and monitor skin integrity  - Assess and monitor nutrition and hydration status  - Monitor labs   - Assess for incontinence   - Turn and reposition patient  - Assist with mobility/ambulation  - Relieve pressure over bony prominences  - Avoid friction and shearing  - Provide appropriate hygiene as needed including keeping skin clean and dry  - Evaluate need for skin moisturizer/barrier cream  - Collaborate with interdisciplinary team   - Patient/family teaching  - Consider wound care consult   Outcome: Progressing     Problem: Nutrition/Hydration-ADULT  Goal: Nutrient/Hydration intake appropriate for improving, restoring or maintaining nutritional needs  Description: Monitor and assess patient's nutrition/hydration status for malnutrition  Collaborate with interdisciplinary team and initiate plan and interventions as ordered  Monitor patient's weight and dietary intake as ordered or per policy  Utilize nutrition screening tool and intervene as necessary  Determine patient's food preferences and provide high-protein, high-caloric foods as appropriate       INTERVENTIONS:  - Monitor oral intake, urinary output, labs, and treatment plans  - Assess nutrition and hydration status and recommend course of action  - Evaluate amount of meals eaten  - Assist patient with eating if necessary   - Allow adequate time for meals  - Recommend/ encourage appropriate diets, oral nutritional supplements, and vitamin/mineral supplements  - Order, calculate, and assess calorie counts as needed  - Recommend, monitor, and adjust tube feedings and TPN/PPN based on assessed needs  - Assess need for intravenous fluids  - Provide specific nutrition/hydration education as appropriate  - Include patient/family/caregiver in decisions related to nutrition  Outcome: Progressing     Problem: PAIN - ADULT  Goal: Verbalizes/displays adequate comfort level or baseline comfort level  Description: Interventions:  - Encourage patient to monitor pain and request assistance  - Assess pain using appropriate pain scale  - Administer analgesics based on type and severity of pain and evaluate response  - Implement non-pharmacological measures as appropriate and evaluate response  - Consider cultural and social influences on pain and pain management  - Notify physician/advanced practitioner if interventions unsuccessful or patient reports new pain  Outcome: Progressing     Problem: INFECTION - ADULT  Goal: Absence or prevention of progression during hospitalization  Description: INTERVENTIONS:  - Assess and monitor for signs and symptoms of infection  - Monitor lab/diagnostic results  - Monitor all insertion sites, i e  indwelling lines, tubes, and drains  - Monitor endotracheal if appropriate and nasal secretions for changes in amount and color  - [place] appropriate cooling/warming therapies per order  - Administer medications as ordered  - Instruct and encourage patient and family to use good hand hygiene technique  - Identify and instruct in appropriate isolation precautions for identified infection/condition  Outcome: Progressing  Goal: Absence of fever/infection during neutropenic period  Description: INTERVENTIONS:  - Monitor WBC    Outcome: Progressing     Problem: SAFETY ADULT  Goal: Maintain or return to baseline ADL function  Description: INTERVENTIONS:  -  Assess patient's ability to carry out ADLs; assess patient's baseline for ADL function and identify physical deficits which impact ability to perform ADLs (bathing, care of mouth/teeth, toileting, grooming, dressing, etc )  - Assess/evaluate cause of self-care deficits   - Assess range of motion  - Assess patient's mobility; develop plan if impaired  - Assess patient's need for assistive devices and provide as appropriate  - Encourage maximum independence but intervene and supervise when necessary  - Involve family in performance of ADLs  - Assess for home care needs following discharge   - Consider OT consult to assist with ADL evaluation and planning for discharge  - Provide patient education as appropriate  Outcome: Progressing  Goal: Maintains/Returns to pre admission functional level  Description: INTERVENTIONS:  - Perform BMAT or MOVE assessment daily    - Set and communicate daily mobility goal to care team and patient/family/caregiver  - Collaborate with rehabilitation services on mobility goals if consulted  - Reposition patient every 2 hours  - Stand patient 2 times a day  - Ambulate patient 2 times a day  - Out of bed to chair 2 times a day   - Out of bed for meals 2 times a day  - Out of bed for toileting  - Record patient progress and toleration of activity level   Outcome: Progressing  Goal: Patient will remain free of falls  Description: INTERVENTIONS:  - Educate patient/family on patient safety including physical limitations  - Instruct patient to call for assistance with activity   - Consult OT/PT to assist with strengthening/mobility   - Keep Call bell within reach  - Keep bed low and locked with side rails adjusted as appropriate  - Keep care items and personal belongings within reach  - Initiate and maintain comfort rounds  - Make Fall Risk Sign visible to staff  Problem: Knowledge Deficit  Goal: Patient/family/caregiver demonstrates understanding of disease process, treatment plan, medications, and discharge instructions  Description: Complete learning assessment and assess knowledge base    Interventions:  - Provide teaching at level of understanding  - Provide teaching via preferred learning methods  Outcome: Progressing     - Apply yellow socks and bracelet for high fall risk patients  - Consider moving patient to room near nurses station  Outcome: Progressing     Problem: DISCHARGE PLANNING  Goal: Discharge to home or other facility with appropriate resources  Description: INTERVENTIONS:  - Identify barriers to discharge w/patient and caregiver  - Arrange for needed discharge resources and transportation as appropriate  - Identify discharge learning needs (meds, wound care, etc )  - Arrange for interpretive services to assist at discharge as needed  - Refer to Case Management Department for coordinating discharge planning if the patient needs post-hospital services based on physician/advanced practitioner order or complex needs related to functional status, cognitive ability, or social support system  Outcome: Progressing

## 2023-05-10 NOTE — ASSESSMENT & PLAN NOTE
Malnutrition Findings:   Adult Malnutrition type: Chronic illness  Adult Degree of Malnutrition: Malnutrition of moderate degree  Malnutrition Characteristics: Muscle loss, Inadequate energy, Fluid accumulation                  360 Statement: Malnutrition of moderate degree in kari cointext of chronic illness related to SOB causing decreased intake as evidenced by severe depletion of muscle at clavicles, and mild depeption at [facility]  Treated with nutritional supplements    BMI Findings: Body mass index is 22 58 kg/m²

## 2023-05-10 NOTE — ASSESSMENT & PLAN NOTE
· Patient is a level 3 DNR  · For now he wishes to pursue aggressive medical management which would include pulmonary toilet bronchodilator therapy steroids BiPAP oxygen therapy and hospitalizations  Discussed with patient and his primary pulmonologist at bedside Dr Toni Hatch  We will try to facilitate outpatient follow-up with palliative care  Wife asking for case management support for finances  Will discuss with case management

## 2023-05-10 NOTE — ASSESSMENT & PLAN NOTE
· PFTs 2020: FEV1/FVC: 30%, FVC: 2L, 59% predicted, FEV1: 0 6L 22% predicted, %, %, DLCO 45%  · Newly required ICU stay due to the severity of his respiratory failure  · Eventually transitioned to the floor on scheduled nebulized breathing treatments and transition to prednisone    · Continue home nebulized breathing treatments on discharge  · Follows closely with Dr Berta Olson at home  · Continue prednisone taper on discharge  · Continue azithromycin 250 mg 3 times weekly  · Continue BiPAP qhs  · Discharge home  Patient was confirmed DNR/DNI during this hospitalization  Plan is to discharge home and when patient has another exacerbation family preferences that he returns to the hospital where they will likely transition to comfort care and palliative measures  · Advised patient and family to follow-up closely with their pulmonologist shortly after discharge

## 2023-05-10 NOTE — ASSESSMENT & PLAN NOTE
Wt Readings from Last 3 Encounters:   05/10/23 52 4 kg (115 lb 9 6 oz)   04/13/23 48 5 kg (107 lb)   03/27/23 46 3 kg (102 lb)     · Left Ventricle: Left ventricular cavity size is mildly dilated  Wall thickness is normal  The left ventricular ejection fraction is 20%  Systolic function is severely reduced  There is severe global hypokinesis with regional variation  Plan:  · Cont home torsemide  · Continue lopressor 12 5mg bid  · Add GDMT as tolerated  · Cardiology following; ? AICD candidate

## 2023-05-10 NOTE — ASSESSMENT & PLAN NOTE
Malnutrition Findings:   Adult Malnutrition type: Chronic illness  Adult Degree of Malnutrition: Malnutrition of moderate degree  Malnutrition Characteristics: Muscle loss, Inadequate energy, Fluid accumulation                  360 Statement: Malnutrition of moderate degree in kari cointext of chronic illness related to SOB causing decreased intake as evidenced by severe depletion of muscle at clavicles, and mild depeption at [employer]  Treated with nutritional supplements    BMI Findings: Body mass index is 22 58 kg/m²       Continue Cardiac Diet

## 2023-05-10 NOTE — PROGRESS NOTES
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 with acute exacerbation  Acute rhinovirus infection, resolved  Acute on chronic systolic CHF  Pulmonary cachexia and deconditioning        Plan:  • Back to baseline this morning   • Started on pred taper, to taper until on home prednisone dose  • Continue BIPAP QHS and prn for increased work of breathing  • Continue bronchodilators   • Continue azithromycin  • Encourage OOB to chair and ambulation   • Can discharge home from pulm perspective      Subjective:   Patient states he feels at his baseline  He would like to go home  He denies SOB, chest pain, lightheadedness  Objective:       Vitals: Blood pressure 101/56, pulse 96, temperature 99 1 °F (37 3 °C), resp  rate 22, height 5' (1 524 m), weight 50 5 kg (111 lb 4 8 oz), SpO2 99 %  , 3LNC, Body mass index is 21 74 kg/m²  Intake/Output Summary (Last 24 hours) at 5/10/2023 0716  Last data filed at 5/10/2023 0501  Gross per 24 hour   Intake 776 ml   Output 1850 ml   Net -1074 ml         Physical Exam  Physical Exam  Vitals and nursing note reviewed  Constitutional:       Comments: Chronically ill-appearing, cachectic   HENT:      Head: Normocephalic and atraumatic  Right Ear: External ear normal       Left Ear: External ear normal       Nose: Nose normal       Mouth/Throat:      Mouth: Mucous membranes are moist       Pharynx: Oropharynx is clear  Eyes:      Conjunctiva/sclera: Conjunctivae normal    Cardiovascular:      Rate and Rhythm: Normal rate and regular rhythm  Pulses: Normal pulses  Heart sounds: Normal heart sounds  Pulmonary:      Effort: Pulmonary effort is normal       Comments: Diminished breath sounds  Abdominal:      General: Abdomen is flat   Bowel sounds are normal       Palpations: Abdomen "is soft  Musculoskeletal:         General: No swelling or tenderness  Cervical back: Neck supple  No muscular tenderness  Skin:     General: Skin is warm and dry  Capillary Refill: Capillary refill takes less than 2 seconds  Neurological:      Mental Status: He is alert and oriented to person, place, and time  Mental status is at baseline  Psychiatric:         Mood and Affect: Mood normal          Behavior: Behavior normal          Thought Content: Thought content normal          Judgment: Judgment normal          Labs: I have personally reviewed pertinent lab results  Laboratory and Diagnostics  Results from last 7 days   Lab Units 05/05/23  0503 05/04/23  0443   WBC Thousand/uL 7 06 6 54   HEMOGLOBIN g/dL 9 5* 10 0*   HEMATOCRIT % 29 9* 32 8*   PLATELETS Thousands/uL 213 235   NEUTROS PCT % 83* 72   MONOS PCT % 9 13*     Results from last 7 days   Lab Units 05/05/23  0503 05/04/23  0443   SODIUM mmol/L 132* 133*   POTASSIUM mmol/L 3 5 3 6   CHLORIDE mmol/L 93* 90*   CO2 mmol/L 40* 43*   ANION GAP mmol/L -1* 0*   BUN mg/dL 19 19   CREATININE mg/dL 0 54* 0 56*   CALCIUM mg/dL 8 9 8 6   GLUCOSE RANDOM mg/dL 91 77                                             ABG:       Microbiology:  Rhinovirus +     Imaging and other studies: I have personally reviewed pertinent films in PACS  CXR 5/6/23: lungs are hyperinflated      Maryjane Stern MD  Pulmonary/Critical Care Fellow PGY-IV  [facility]      Disclaimer: Portions of the record may have been created with voice recognition software  Occasional wrong word or \"sound a like\" substitutions may have occurred due to the inherent limitations of voice recognition software  Careful consideration should be taken to recognize, using context, where substitutions have occurred      "

## 2023-05-10 NOTE — DISCHARGE SUMMARY
[facility]  Discharge- Michael Molina  1955, 79 y o  male MRN: [id number]  Unit/Bed#: [facility] 805-01 Encounter: [id number]  Primary Care Provider: Pedro Demarco DO   Date and time admitted to hospital: 4/30/2023  5:42 PM    * COPD exacerbation ([address] )  Assessment & Plan  · PFTs 2020: FEV1/FVC: 30%, FVC: 2L, 59% predicted, FEV1: 0 6L 22% predicted, %, %, DLCO 45%  · Newly required ICU stay due to the severity of his respiratory failure  · Eventually transitioned to the floor on scheduled nebulized breathing treatments and transition to prednisone    · Continue home nebulized breathing treatments on discharge  · Follows closely with Dr Radha Conrad at home  · Continue prednisone taper on discharge  · Continue azithromycin 250 mg 3 times weekly  · Continue BiPAP qhs  · Discharge home  Patient was confirmed DNR/DNI during this hospitalization  Plan is to discharge home and when patient has another exacerbation family preferences that he returns to the hospital where they will likely transition to comfort care and palliative measures  · Advised patient and family to follow-up closely with their pulmonologist shortly after discharge  Chronic HFrEF (heart failure with reduced ejection fraction) (HCC)  Assessment & Plan  Wt Readings from Last 3 Encounters:   05/10/23 52 4 kg (115 lb 9 6 oz)   04/13/23 48 5 kg (107 lb)   03/27/23 46 3 kg (102 lb)     · Left Ventricle: Left ventricular cavity size is mildly dilated  Wall thickness is normal  The left ventricular ejection fraction is 20%  Systolic function is severely reduced  There is severe global hypokinesis with regional variation  Plan:  · Cont home torsemide  · Continue lopressor 12 5mg bid  · Add GDMT as tolerated  · Cardiology following; ? AICD candidate        Chronic respiratory failure with hypoxia and hypercapnia (HCC)  Assessment & Plan  No results for input(s): Tirso Leon in the last 72 hours  See A/P under COPD exacerbation    Moderate protein-calorie malnutrition ([facility] )  Assessment & Plan  Malnutrition Findings:   Adult Malnutrition type: Chronic illness  Adult Degree of Malnutrition: Malnutrition of moderate degree  Malnutrition Characteristics: Muscle loss, Inadequate energy, Fluid accumulation                  360 Statement: Malnutrition of moderate degree in kari cointext of chronic illness related to SOB causing decreased intake as evidenced by severe depletion of muscle at clavicles, and mild depeption at [employer]  Treated with nutritional supplements    BMI Findings: Body mass index is 22 58 kg/m²  Protein-calorie malnutrition ([facility] )  Assessment & Plan  Malnutrition Findings:   Adult Malnutrition type: Chronic illness  Adult Degree of Malnutrition: Malnutrition of moderate degree  Malnutrition Characteristics: Muscle loss, Inadequate energy, Fluid accumulation                  360 Statement: Malnutrition of moderate degree in [facility] cointext of chronic illness related to SOB causing decreased intake as evidenced by severe depletion of muscle at clavicles, and mild depeption at [employer]  Treated with nutritional supplements    BMI Findings: Body mass index is 22 58 kg/m²  Continue Cardiac Diet      BPH with obstruction/lower urinary tract symptoms  Assessment & Plan  · Continue home flomax    Goals of care, counseling/discussion  Assessment & Plan  · Patient is a level 3 DNR  · For now he wishes to pursue aggressive medical management which would include pulmonary toilet bronchodilator therapy steroids BiPAP oxygen therapy and hospitalizations  Discussed with patient and his primary pulmonologist at bedside Dr Matteo Flor  We will try to facilitate outpatient follow-up with palliative care  Wife asking for case management support for finances  Will discuss with case management      Mood disorder ([facility] )  Assessment & Plan  · Continue home Lexapro     Vitamin D deficiency  Assessment & Plan  · Continue home D2      Discharging Physician / Practitioner: Yamileth Caceres DO  PCP: Jeanette Rapp DO  Admission Date:   Admission Orders (From admission, onward)     Ordered        04/30/23 2030  Inpatient Admission  Once                      Discharge Date: 05/10/23    Consultations During Hospital Stay:  · Critical care  · Pulmonology  · Palliative care    Procedures Performed:   · None    Significant Findings / Test Results:   · Severe end-stage COPD    Incidental Findings:   · none     Test Results Pending at Discharge (will require follow up):   · none     Outpatient Tests Requested:  · none    Complications:  none    Reason for Admission: Acute respiratory failure with hypoxia and hypercapnia    Hospital Course:   Erma Hartmann  is a 79 y o  male patient who originally presented to the hospital on 4/30/2023 due to shortness of breath and respiratory failure  Patient has known longstanding end-stage COPD  He follows closely with Dr Mich Gastelum of pulmonology as an outpatient  Unfortunately he has had numerous exacerbations and hospitalizations over the last several months  He was admitted initially to the ICU due to the severity of his respiratory failure  He was treated with IV steroids, scheduled nebulized breathing treatments, and increased supplemental oxygen  He also required BiPAP  Ultimately he was stabilized and transition to the floor  Goals of care were discussed at length with the patient and his wife  He is currently a DNR/DNI  He hopes to be able to maintain the status quo at home for as long as he can however he understands his terminal prognosis  We will continue on a prednisone taper and his nebulized regimen at home  I advised close outpatient follow-up with pulmonology and palliative care    Per patient and his wife, plan with next exacerbation is to return to the hospital, but to transition to palliative care and comfort measures at that time   Otherwise all questions and concerns were addressed  Patient was very anxious and eager to return home  Please see above list of diagnoses and related plan for additional information  Condition at Discharge: good    Discharge Day Visit / Exam:   Subjective: Patient seen and examined  No events overnight  Stable on home O2 requirements  He is eager to return home  Vitals: Blood Pressure: 112/57 (05/10/23 0744)  Pulse: 99 (05/10/23 0744)  Temperature: 97 7 °F (36 5 °C) (05/10/23 0744)  Temp Source: Oral (05/07/23 0714)  Respirations: 22 (05/09/23 2211)  Height: 5' (152 4 cm) (05/01/23 1000)  Weight - Scale: 52 4 kg (115 lb 9 6 oz) (05/10/23 0600)  SpO2: 96 % (05/10/23 0744)    PHYSICAL EXAM:    Vitals signs reviewed  Constitutional   Awake and cooperative  NAD  Cachectic and chronically ill-appearing  Head/Neck   Normocephalic  Atraumatic  HEENT   No scleral icterus  EOMI  Heart   Regular rate and rhythm  No murmers  Lungs  prolonged expiration without wheezing or rhonchi  Decreased air movement globally  Respirations unlabored  Abdomen   Soft  Nontender  Nondistended  Skin   Skin color normal  No rashes  Extremities   No deformities  No peripheral edema  Neuro   Alert and oriented  No new deficits  Psych   Mood stable  Affect normal        Discussion with Family: Updated  (wife) at bedside  Discharge instructions/Information to patient and family:   See after visit summary for information provided to patient and family  Provisions for Follow-Up Care:  See after visit summary for information related to follow-up care and any pertinent home health orders  Disposition:   Home with VNA Services (Reminder: Complete face to face encounter)    Planned Readmission: NO     Discharge Statement:  I spent 45 minutes discharging the patient  This time was spent on the day of discharge  I had direct contact with the patient on the day of discharge   Greater than 50% of the total time was spent examining patient, answering all patient questions, arranging and discussing plan of care with patient as well as directly providing post-discharge instructions  Additional time then spent on discharge activities  Discharge Medications:  See after visit summary for reconciled discharge medications provided to patient and/or family        **Please Note: This note may have been constructed using a voice recognition system**

## 2023-05-11 ENCOUNTER — HOME CARE VISIT (OUTPATIENT)
Dept: HOME HEALTH SERVICES | Facility: HOME HEALTHCARE | Age: 68
End: 2023-05-11

## 2023-05-11 ENCOUNTER — TRANSITIONAL CARE MANAGEMENT (OUTPATIENT)
Dept: INTERNAL MEDICINE CLINIC | Facility: CLINIC | Age: 68
End: 2023-05-11

## 2023-05-11 NOTE — UTILIZATION REVIEW
NOTIFICATION OF ADMISSION DISCHARGE   This is a Notification of Discharge from [facility]  Please be advised that this patient has been discharge from our facility  Below you will find the admission and discharge date and time including the patient’s disposition  UTILIZATION REVIEW CONTACT:  Quyen Riley  Utilization   Network Utilization Review Department  Phone: [phone] x carefully listen to the prompts  All voicemails are confidential   Email: [email or web address]     ADMISSION INFORMATION  PRESENTATION DATE: 4/30/2023  5:42 PM  OBERVATION ADMISSION DATE:   INPATIENT ADMISSION DATE: 4/30/23  8:30 PM   DISCHARGE DATE: 5/10/2023 10:33 AM   DISPOSITION:Home with Home Health Care    IMPORTANT INFORMATION:  Send all requests for admission clinical reviews, approved or denied determinations and any other requests to dedicated fax number below belonging to the campus where the patient is receiving treatment   List of dedicated fax numbers:  [address] DENIALS (Administrative/Medical Necessity) [phone]   [address] (Maternity/NICU/Pediatrics) [phone]   [facility] [phone]   [facility] 87 [phone]   [address] [phone]   220 [facility] [phone] [facility] [phone]   [facility] 119 [phone]   [facility]  [phone]   [facility] [phone]   [facility] [address] [facility] [phone]

## 2023-05-12 ENCOUNTER — TELEPHONE (OUTPATIENT)
Dept: INTERNAL MEDICINE CLINIC | Facility: CLINIC | Age: 68
End: 2023-05-12

## 2023-05-12 ENCOUNTER — HOME CARE VISIT (OUTPATIENT)
Dept: HOME HEALTH SERVICES | Facility: HOME HEALTHCARE | Age: 68
End: 2023-05-12

## 2023-05-12 ENCOUNTER — TELEPHONE (OUTPATIENT)
Dept: PULMONOLOGY | Facility: CLINIC | Age: 68
End: 2023-05-12

## 2023-05-12 DIAGNOSIS — I50.22 CHRONIC HFREF (HEART FAILURE WITH REDUCED EJECTION FRACTION) (HCC): ICD-10-CM

## 2023-05-12 DIAGNOSIS — J43.9 PULMONARY EMPHYSEMA, UNSPECIFIED EMPHYSEMA TYPE (HCC): Chronic | ICD-10-CM

## 2023-05-12 DIAGNOSIS — J96.12 CHRONIC RESPIRATORY FAILURE WITH HYPOXIA AND HYPERCAPNIA (HCC): Primary | ICD-10-CM

## 2023-05-12 DIAGNOSIS — J96.11 CHRONIC RESPIRATORY FAILURE WITH HYPOXIA AND HYPERCAPNIA (HCC): Primary | ICD-10-CM

## 2023-05-12 NOTE — Clinical Note
Gino Dennis 78 yo male  1955  Pt was recently hospitalized -5/10 for exacerbation of copd, chf with Ed of 20percent and a 9 pound weight gain in one month  Pt also has pcm with alb of 2 8  Pt requires supplemental oxygen and 3 l via nc  Pps 40   Rloc

## 2023-05-12 NOTE — CASE COMMUNICATION
SN arrived to home, no answer via phone or door  SN left VM about calling VNA back  Wife ended up calling SN back and informing patient not wanting Home health services but plans to start working with pallative next week  SN provided information for home hospice services since patient not wanting to go back to hospital per wife  SN called pcp office left message about possible hospice referral for patient since wife was interesting in  it  SN informed CC Janis about above

## 2023-05-12 NOTE — TELEPHONE ENCOUNTER
Patients wife called asking if Dr Cherie Ortiz can give her a call regarding her   She wouldn't give me much information she just said that she needs something straightened out with the hospital  Please advise

## 2023-05-12 NOTE — TELEPHONE ENCOUNTER
Hi, my name is Bony Eng, I'm with [facility] visiting nurses  I'm calling in regards to [facility]  He was scheduled for a start of care for home health, but he wasn't available earlier  And then the family just called me back  He's probably more appropriate for home Hospice due to his wishes of not going back to the hospital and kind of working with palliative and kind of getting comfortable and having a better quality of life  I was curious if the family doctor was willing to write a home Hospice referral so the family and patient can get the support they need at home  The home health referral is going to be discontinued because he's not appropriate for home health  So please send a referral to home Hospice for this patient  Thanks so much  Have any questions? Please call me back, Thanks   Byjosefina

## 2023-05-16 ENCOUNTER — OFFICE VISIT (OUTPATIENT)
Dept: INTERNAL MEDICINE CLINIC | Facility: CLINIC | Age: 68
End: 2023-05-16

## 2023-05-16 VITALS
HEIGHT: 60 IN | WEIGHT: 105.8 LBS | OXYGEN SATURATION: 94 % | DIASTOLIC BLOOD PRESSURE: 58 MMHG | HEART RATE: 81 BPM | RESPIRATION RATE: 19 BRPM | SYSTOLIC BLOOD PRESSURE: 118 MMHG | TEMPERATURE: 98 F | BODY MASS INDEX: 20.77 KG/M2

## 2023-05-16 DIAGNOSIS — J44.1 COPD EXACERBATION (HCC): Primary | ICD-10-CM

## 2023-05-16 DIAGNOSIS — J96.12 CHRONIC RESPIRATORY FAILURE WITH HYPOXIA AND HYPERCAPNIA (HCC): ICD-10-CM

## 2023-05-16 DIAGNOSIS — I50.22 CHRONIC HFREF (HEART FAILURE WITH REDUCED EJECTION FRACTION) (HCC): ICD-10-CM

## 2023-05-16 DIAGNOSIS — J96.11 CHRONIC RESPIRATORY FAILURE WITH HYPOXIA AND HYPERCAPNIA (HCC): ICD-10-CM

## 2023-05-16 NOTE — PROGRESS NOTES
Assessment & Plan     1  COPD exacerbation ([facility])  Assessment & Plan:  -end stage COPD with acute flare, treated with steroid taper, nebs  -pt completing steroid taper down to baseline prednisone  Continue oxygen  -on azithromycin three times weekly  -bipap qhs  -patient and wife agree to full code at this time, prefers to have further discussion after Palliative Care consult at end of week      2  Chronic respiratory failure with hypoxia and hypercapnia ([facility])  Assessment & Plan:  -continuous 3L oxygen, sometimes more with exertion  -on chronic prednisone  -follows closely with pulm      3  Chronic HFrEF (heart failure with reduced ejection fraction) ([facility])  Assessment & Plan:  -persistent BLE edema despite decreased weight, likely due to recent hospitalization  -remains on torsemide 20mg daily as per Cardio               Subjective     Transitional Care Management Review:   Farrukh Dutta  is a 79 y o  male here for TCM follow up  During the TCM phone call patient stated:  TCM Call     Date and time call was made  5/11/2023 10:45 AM    Hospital care reviewed  Records reviewed    Patient was hospitialized at  [address]  rehab    Date of Admission  04/30/23    Date of discharge  05/10/23    Diagnosis  COPD    Disposition  Home    Were the patients medications reviewed and updated  Yes    Current Symptoms  Fatigue    Shortness of breath severity  Mild    Fatigue severity  Mild      TCM Call     Post hospital issues  None    Should patient be enrolled in anticoag monitoring? No    Scheduled for follow up?   Yes  [id number]    Did you obtain your prescribed medications  Yes    Do you need help managing your prescriptions or medications  No    Is transportation to your appointment needed  No    I have advised the patient to call PCP with any new or worsening symptoms  Aruna Rea MA    Living Arrangements  Spouse or Significiant other    Support System  None    The type of support provided  None    Do you have social support  Yes, as much as I need    Are you recieving any outpatient services  No    Are you recieving home care services  No  patient refused    Are you using any community resources  No    Current waiver services  No    Have you fallen in the last 12 months  No    Interperter language line needed  No    Counseling  Family    Counseling topics  Diagnostic results    Comments  appointment scheduled for 4/7        HPI   He is accompanied by his wife  Has difficulty expectorating  He has noticed BP fluctuates  Sp02 at rest upper 80s at 3L  He remains full code  He is scheduled to see Palliative Care later this week  Review of Systems   Constitutional: Positive for activity change  Negative for unexpected weight change  Respiratory: Positive for cough and shortness of breath  Cardiovascular: Positive for leg swelling  Negative for chest pain and palpitations  Gastrointestinal: Negative for diarrhea  Genitourinary: Positive for frequency  Neurological: Negative for dizziness, light-headedness and headaches  Objective     /58 (BP Location: Left arm, Patient Position: Sitting, Cuff Size: Adult)   Pulse 81   Temp 98 °F (36 7 °C) (Tympanic)   Resp 19   Ht 5' (1 524 m)   Wt 48 kg (105 lb 12 8 oz)   SpO2 94%   BMI 20 66 kg/m²      Physical Exam  Vitals reviewed  HENT:      Head: Normocephalic  Nose:      Comments: Wears nasal cannula     Mouth/Throat:      Mouth: Mucous membranes are moist    Cardiovascular:      Rate and Rhythm: Tachycardia present  Rhythm regularly irregular  Pulses: Normal pulses  Heart sounds: Normal heart sounds  Pulmonary:      Comments: Mild conversational dyspnea  Decreased BS but clear  Musculoskeletal:      Right lower leg: Edema present  Left lower leg: Edema present  Comments: Thoracic kyphosis   Neurological:      Mental Status: He is alert and oriented to person, place, and time  Psychiatric:         Mood and Affect: Mood normal        Medications have been reviewed by provider in current encounter    Maggie Post DO

## 2023-05-16 NOTE — PROGRESS NOTES
Advanced Heart Failure / Pulmonary Hypertension Outpatient Visit    Ana Moreno  79 y o  male   MRN: [id number]  Encounter: [id number]    Assessment:  Patient Active Problem List    Diagnosis Date Noted   • Moderate protein-calorie malnutrition ([place] [address] ) 05/09/2023   • HLD (hyperlipidemia) 05/02/2023   • COPD exacerbation ([place] [address] ) 04/28/2023   • Hyperglycemia 03/30/2023   • Physical deconditioning 02/21/2023   • Anemia 02/17/2023   • Chronic HFrEF (heart failure with reduced ejection fraction) ([place] [address] ) 09/12/2022   • Protein-calorie malnutrition ([place] [address] ) 08/27/2022   • Pulmonary nodules/lesions, multiple 03/27/2022   • Prostate cancer ([place] [address] ) 05/24/2021   • BPH with obstruction/lower urinary tract symptoms 04/13/2021   • Goals of care, counseling/discussion 02/25/2021   • Chronic respiratory failure with hypoxia and hypercapnia ([place] [address] ) 11/19/2020   • Macrocytosis without anemia 05/23/2019   • Other insomnia 02/18/2019   • Gastroesophageal reflux disease 01/05/2017   • Nonobstructive atherosclerosis of coronary artery 02/25/2016   • Mood disorder ([place] [address] ) 08/18/2015   • Impaired fasting glucose 02/19/2015   • Osteoporosis 10/14/2014   • Vitamin D deficiency 10/14/2014   • Nonischemic cardiomyopathy ([place] [address] ) 09/26/2014   • Left bundle-branch block 08/03/2013   • Osteoarthritis 08/03/2013   • KEVIN and COPD overlap syndrome ([place] [address] ) 07/29/2013   • Chronic obstructive pulmonary disease ([place] [address] ) 07/18/2012       Today's Plan:  • Reasonable volume status on exam  Continue current torsemide dosing  • Scheduled to see palliative tomorrow  Patient and wife plan to discuss code status/future wishes in more detail at that appointment  • 2 g sodium restriction, 2L fluid restriction, daily weights  • BMP ordered to be done in the next 2 weeks       Plan:  Acute on Chronic HFrEF; LVEF 20%; NYHA III; ACC/AHA Stage C              Etiology: Nonischemic cardiomyopathy   Possible dyssynchrony from chronic LBBB   Despite QRS only being 120 ms, echo shows significant dyssynchrony  Recent drop in EF likely related to stress myopathy with acute exacerbation of COPD  Weighed 115 lb on 5/10, 105 lb on 5/16/23  Today, weighs 104 lb       TTE 5/1/23: LVEF 20%  Severe global hypokinesis with regional variation  RV cavity size normal, systolic function normal  Trace MR, TR    TTE 2/21/23: LVEF 30%  LVIDd 6 6cm  severe global hypokinesis  Grade I DD  RV cavity size and systolic function normal  Mild TR  TTE 2/12/2023: LVEF 20-25%   Severe global hypokinesis with regional variation   Grade 1 DD   Abnormal septal motion consistent with LBBB   RV cavity mildly dilated, normal systolic function   Mild MR, mild TR   RVSP 38   Dilated IVC  TTE 8/26/2022: LVEF 40%   RV cavity mildly dilated   Systolic function normal   Mild MR, TR   Normal IVC      Neurohormonal Blockade:  --Beta Blocker: no  --ARNi / ACEi / ARB: losartan 12 5 mg QD, off   --Aldosterone Antagonist: no   --SGLT2 Inhibitor: no  --Home Diuretic: torsemide 20 mg QD     Sudden Cardiac Death Risk Reduction:  --ICD: LVEF newly reduced to 20-25%  DNR/DNI; meeting with palliative re: future wishes in near future       Electrical Resynchronization:  --Candidacy for BiV device: QRSd 120ms, chronic LBBB with dyssynchrony on echocardiogram      Advanced Therapies (if appropriate): Not appropriate at this time, particularly considering advanced lung disease         COVID-19  Tested positive on 2/22/23    Nonobstructive CAD  Has coronary calcium on CT  On aspirin and statin  Hyperlipidemia  Continue statin  LDL 75 8/2021  COPD (end stage)  Former smoker; 105 pack years, quit in 2012  Severe disease, follows closely with pulm   On home oxygen, 3-4L  Multiple hospitalizations with acute COPD exacerbations   Management per pulm  KEVIN   On BiPAP nightly  GOC  Introduced to palliative care team this past admit  Appointment with them tomorrow           HPI:   Deeanna Buerger Jr  is a 74year-old male who presented to [facility] with an acute COPD exacerbation requiring intubation  Extubated 2/15/23   PMH includes nonischemic cardiomyopathy, chronic systolic and diastolic heart failure, nonobstructive CAD, hyperlipidemia, severe COPD, KEVIN   Was previously admitted in 8/2022 for acute decompensated heart failure and COPD exacerbation   LVEF 40% at that time, down from prior of 45 to 50%, though previously EF had been 35% for years   Started on Lasix at that time  [place] been euvolemic, though blood pressures have made it difficult to start/titrate up on GDMT   Recently saw Dr Arlene Callahan in the office in December, at which time his Lasix was shifted to as needed and adequate oral intake and hydration were encouraged      He was reportedly short of breath for 2 days prior to presentation, with hallucinations   His wife called 911 and EMS intubated him in the field  [facility] Donna was found to have an elevated procalcitonin on admission and started on azithromycin and ceftriaxone   He was given IV Lasix with good urine output   He was weaned off the ventilator and extubated on 2/15   Maintained on BiPAP overnight and has been receiving scheduled nebulizers with aggressive pulmonary hygiene  TTE on admission with LVEF 20 to 25%, down from 40% in 8/2022       He is also followed by pulmonology, who recommended consideration for home hospice for severe Meaghan Hagan was seen by palliative care while inpatient here, and patient and family elected to make code status level 3 DNR/DNI   Pulmonology has been following him as well and has been managing steroids, bronchodilators, and nebulizer therapy  Per TH: 3/7/23  Presents for post hospital follow up with his wife  Just discharged from [address] [place] [address]  Feeling at his baseline  Gets SOB with minimal exertion  O2 sats in the low 80's on 3-4 L NC,  on initial assessment today  Has complaints that his feet are red and swollen today  Wants to keep fighting  Dreading another hospital admission       Diuretics increased after visit with Serafin Gowers (Lasix increased to BID x 3 days )    Recently hospitalized from 4/30-5/10/23 for acute respiratory failure in the setting of COPD  He was admitted initially to the ICU and treated with IV steroids, scheduled nebulized breathing treatments, and increased supplemental oxygen  He also required BiPAP  Ultimately he was stabilized and transition to the floor  Goals of care were discussed at length with the patient and his wife  He is currently a DNR/DNI  Plan per chart review per patient and his wife; plan with next exacerbation is to return to the hospital, but to transition to palliative care and comfort measures at that time  Referred to home hospice as of 5/12/23, wife expressed they would like to speak to palliative on Friday before making a final decision  5/17/23: presents today for follow up  Feels at his baseline, O2 sats mid to high 90s on home 3L NC  Some LE swelling, improves with elevating his legs  Able to complete ADLs and move around the house, SOB not increased from his baseline  Meeting with palliative care tomorrow  Denies chest pain, palpitations, dizziness, syncope  Feels like he's urinating much more with torsemide than he did with Lasix  Weighs himself daily, small fluctuations but overall stable  Past Medical History:   Diagnosis Date   • Acute metabolic encephalopathy 8/36/9137   • Arthritis    • Bladder mass    • Cardiomyopathy [facility])    • Chest pain    • COPD (chronic obstructive pulmonary disease) (HCC)    • COVID-19 virus infection 2/23/2023   • CPAP (continuous positive airway pressure) dependence    • Emphysema of lung (HCC)    • Hypoxia     nocturnal   • Left bundle branch block    • Multiple pulmonary nodules     last assessed: 10/12/16   • Pneumonia    • Sleep apnea    • Sleep apnea, obstructive    • Smoker    • Weight loss 8/29/2019     Review of Systems   Constitutional: Negative for chills, fever and unexpected weight change     HENT: Negative for ear pain and sore throat  Eyes: Negative for pain and visual disturbance  Respiratory: Positive for shortness of breath (chronic, at baseline)  Negative for cough  Cardiovascular: Positive for leg swelling  Negative for chest pain and palpitations  Gastrointestinal: Negative for abdominal pain and vomiting  Genitourinary: Negative for dysuria and hematuria  Musculoskeletal: Negative for arthralgias and back pain  Skin: Negative for color change and rash  Neurological: Negative for seizures and syncope  All other systems reviewed and are negative  14-point ROS completed and negative except as stated above and/or in the HPI        No Known Allergies    Current Outpatient Medications:   •  acetaminophen (TYLENOL) 325 mg tablet, Take 3 tablets (975 mg total) by mouth every 8 (eight) hours as needed for mild pain or moderate pain, Disp: , Rfl: 0  •  albuterol (PROVENTIL HFA,VENTOLIN HFA) 90 mcg/act inhaler, Inhale 2 puffs every 6 (six) hours as needed for wheezing, Disp: 8 5 g, Rfl: 11  •  aspirin 81 mg chewable tablet, Chew 1 tablet (81 mg total) daily, Disp: 30 tablet, Rfl: 0  •  azithromycin (ZITHROMAX) 250 mg tablet, Take 1 tablet (250 mg total) by mouth 3 (three) times a week, Disp: 12 tablet, Rfl: 0  •  budesonide (PULMICORT) 0 5 mg/2 mL nebulizer solution, TAKE 2 ML (0 5 MG TOTAL) BY NEBULIZATION TWICE A DAY RINSE MOUTH AFTER USE, Disp: 360 mL, Rfl: 2  •  cyanocobalamin (VITAMIN B-12) 1000 MCG tablet, Take 1 tablet (1,000 mcg total) by mouth daily, Disp: 30 tablet, Rfl: 0  •  Diclofenac Sodium (VOLTAREN) 1 %, APPLY 2 GRAMS 4 TIMES A DAY, Disp: , Rfl:   •  docusate sodium (COLACE) 100 mg capsule, Take 100 mg by mouth 2 (two) times a day, Disp: , Rfl:   •  ergocalciferol (VITAMIN D2) 50,000 units, TAKE 1 CAPSULE (50,000 UNITS TOTAL) BY MOUTH EVERY 28 DAYS, Disp: 3 capsule, Rfl: 1  •  formoterol (PERFOROMIST) 20 MCG/2ML nebulizer solution, TAKE 2 ML (20 MCG TOTAL) BY NEBULIZATION 2 (TWO) TIMES A DAY, Disp: , Rfl:   •  guaiFENesin (MUCINEX) 600 mg 12 hr tablet, Take 2 tablets (1,200 mg total) by mouth every 12 (twelve) hours, Disp: 60 tablet, Rfl: 6  •  ipratropium (ATROVENT) 0 02 % nebulizer solution, Take 2 5 mL (0 5 mg total) by nebulization 3 (three) times a day, Disp: 225 mL, Rfl: 5  •  ipratropium-albuterol (DUO-NEB) 0 5-2 5 mg/3 mL nebulizer solution, Take 3 mL by nebulization 3 (three) times a day, Disp: , Rfl:   •  levalbuterol (XOPENEX) 1 25 mg/0 5 mL nebulizer solution, Take 0 5 mL (1 25 mg total) by nebulization 3 (three) times a day, Disp: , Rfl: 0  •  Melatonin 5 MG TABS, Take 1 tablet by mouth daily at bedtime, Disp: , Rfl:   •  predniSONE 10 mg tablet, Take 4 tablets (40 mg total) by mouth daily for 2 days, THEN 3 tablets (30 mg total) daily for 3 days, THEN 2 tablets (20 mg total) daily for 3 days, THEN 1 tablet (10 mg total) daily for 3 days  , Disp: 26 tablet, Rfl: 0  •  rosuvastatin (CRESTOR) 10 MG tablet, Take 1 tablet (10 mg total) by mouth daily, Disp: 90 tablet, Rfl: 3  •  senna-docusate sodium (SENOKOT S) 8 6-50 mg per tablet, Take 1 tablet by mouth daily at bedtime, Disp: , Rfl: 0  •  tamsulosin (FLOMAX) 0 4 mg, Take 1 capsule (0 4 mg total) by mouth daily with dinner, Disp: , Rfl: 0  •  torsemide (DEMADEX) 20 mg tablet, Take 1 tablet (20 mg total) by mouth daily, Disp: 90 tablet, Rfl: 3  •  predniSONE 5 mg tablet, Take 1 tablet (5 mg total) by mouth daily Do not start before March 5, 2023   (Patient not taking: Reported on 5/18/2023), Disp: , Rfl: 0    Social History     Socioeconomic History   • Marital status: /Civil Union     Spouse name: Not on file   • Number of children: Not on file   • Years of education: Not on file   • Highest education level: Not on file   Occupational History   • Not on file   Tobacco Use   • Smoking status: Former     Packs/day: 2 50     Years: 42 00     Pack years: 105 00     Types: Cigarettes     Start date: 5     Quit date: 2012 Years since quittin 3   • Smokeless tobacco: Former   • Tobacco comments:     1 ppd for 37 years, 2010 down to 5 cigs a day, is around second hand smoke   Vaping Use   • Vaping Use: Never used   Substance and Sexual Activity   • Alcohol use: Not Currently     Comment: rarely   • Drug use: No   • Sexual activity: Not Currently     Partners: Female   Other Topics Concern   • Not on file   Social History Narrative    Daily coffee consumption: 8 or more cups a day        Used to work in [employer]  On disability  Social Determinants of Health     Financial Resource Strain: Not on file   Food Insecurity: No Food Insecurity   • Worried About [address] in the Last Year: Never true   • Ran Out of Food in the Last Year: Never true   Transportation Needs: No Transportation Needs   • Lack of Transportation (Medical): No   • Lack of Transportation (Non-Medical): No   Physical Activity: Not on file   Stress: Not on file   Social Connections: Not on file   Intimate Partner Violence: Not on file   Housing Stability: Unknown   • Unable to Pay for Housing in the Last Year: No   • Number of Places Lived in the Last Year: Not on file   • Unstable Housing in the Last Year: No     Family History   Problem Relation Age of Onset   • Diabetes Mother        Vitals:  Blood pressure (!) 86/56, pulse 58, height 5' (1 524 m), weight 47 2 kg (104 lb), SpO2 97 %  Body mass index is 20 31 kg/m²    Wt Readings from Last 10 Encounters:   23 47 2 kg (104 lb)   23 48 kg (105 lb 12 8 oz)   05/10/23 52 4 kg (115 lb 9 6 oz)   23 48 5 kg (107 lb)   23 46 3 kg (102 lb)   23 45 8 kg (101 lb)   23 44 8 kg (98 lb 12 3 oz)   23 48 1 kg (106 lb)   22 44 3 kg (97 lb 9 6 oz)   22 49 8 kg (109 lb 12 8 oz)     Vitals:    23 0957   BP: (!) 86/56   BP Location: Right arm   Patient Position: Sitting   Cuff Size: Child   Pulse: 58   SpO2: 97%   Weight: 47 2 kg (104 lb)   Height: 5' (1 524 m)       Physical Exam  Constitutional:       Appearance: Normal appearance  HENT:      Head: Normocephalic  Nose: Nose normal       Mouth/Throat:      Mouth: Mucous membranes are moist    Eyes:      Conjunctiva/sclera: Conjunctivae normal    Neck:      Comments: JVP at clavicle   Cardiovascular:      Rate and Rhythm: Normal rate and regular rhythm  Pulmonary:      Effort: Pulmonary effort is normal       Comments: decreased air movement  Musculoskeletal:      Cervical back: Neck supple  Right lower le+ Pitting Edema present  Left lower le+ Pitting Edema present  Skin:     General: Skin is dry  Neurological:      General: No focal deficit present  Mental Status: He is alert and oriented to person, place, and time  Psychiatric:         Mood and Affect: Mood normal          Behavior: Behavior normal          Labs & Results:  Lab Results   Component Value Date    WBC 7 06 2023    HGB 9 5 (L) 2023    HCT 29 9 (L) 2023     (H) 2023     2023     Lab Results   Component Value Date    SODIUM 132 (L) 2023    K 3 5 2023    CL 93 (L) 2023    CO2 40 (H) 2023    BUN 19 2023    CREATININE 0 54 (L) 2023    GLUC 91 2023    CALCIUM 8 9 2023     Lab Results   Component Value Date    INR 0 93 2023    INR 0 95 2022    PROTIME 12 6 2023    PROTIME 12 3 2022     No results found for: BNP   Lab Results   Component Value Date    NTBNP 17,569 (H) 2023        Counseling / Coordination of Care  Total office time spent today 60 minutes  Greater than 50% of total time was spent with the patient and / or family counseling and / or coordination of care  Thank you for the opportunity to participate in the care of this patient      Jose Luis Yen PA-C

## 2023-05-18 ENCOUNTER — OFFICE VISIT (OUTPATIENT)
Dept: CARDIOLOGY CLINIC | Facility: CLINIC | Age: 68
End: 2023-05-18

## 2023-05-18 VITALS
HEART RATE: 58 BPM | WEIGHT: 104 LBS | SYSTOLIC BLOOD PRESSURE: 86 MMHG | DIASTOLIC BLOOD PRESSURE: 56 MMHG | HEIGHT: 60 IN | OXYGEN SATURATION: 97 % | BODY MASS INDEX: 20.42 KG/M2

## 2023-05-18 DIAGNOSIS — I50.22 CHRONIC HFREF (HEART FAILURE WITH REDUCED EJECTION FRACTION) (HCC): Primary | ICD-10-CM

## 2023-05-18 DIAGNOSIS — Z09 HOSPITAL DISCHARGE FOLLOW-UP: ICD-10-CM

## 2023-05-18 NOTE — ASSESSMENT & PLAN NOTE
-continuous 3L oxygen, sometimes more with exertion  -on chronic prednisone  -follows closely with pulm

## 2023-05-18 NOTE — ASSESSMENT & PLAN NOTE
-persistent BLE edema despite decreased weight, likely due to recent hospitalization  -remains on torsemide 20mg daily as per Cardio

## 2023-05-18 NOTE — PROGRESS NOTES
"Outpatient Follow-Up - Palliative and Supportive Care   Nanda Vaughn  79 y o  male [id number]    Assessment & Plan  Problem List Items Addressed This Visit        Respiratory    Chronic obstructive pulmonary disease (HCC) (Chronic)    Pulmonary nodules/lesions, multiple - Primary       Cardiovascular and Mediastinum    Chronic HFrEF (heart failure with reduced ejection fraction) (HCC)       Genitourinary    Prostate cancer (HCC) (Chronic)       Other    Goals of care, counseling/discussion    Protein-calorie malnutrition ([address] )    Moderate protein-calorie malnutrition ([address] )     Plan:  1) Symptom Management  · Patient declines any additional changes to medication regimen  He does not want additional pill burden  Did discuss use of benzodiazepines for anxiety associated with air hunger  · Acetaminophen 975 mg PO q8 hrs PRN mild-moderate pain  · Volaren gel 2 grams four times daily  · Sennokot-S 8 6-50 mg qHS  · Colace 100 mg PO BID  · Encouraged tobacco cessation and educated about dangers of smoking with home oxygen  Patient and wife express understanding  2) Goals of Care  · Per chart review, patient had been referred to hospital, then decided that he would like to go back to hospital one more time before choosing comfort care/hospice  · At today's visit, he states \"I am not ready to go\" and wishes to return to hospital for medical treatments and follow with specialists  · Discussed hospice and patient does not feel it is the right time for him to consider hospice  · Discussed code status and patient will take time to consider  Will readdress at next visit  · Provided advanced directives for patient to review and complete at next visit  · Goals of care discussions will be ongoing    3) Social Support  · Patient well-supported by his wife Marvin Dean  · Emotional support provided    4) Follow-Up  · Patient will follow-up in 4 weeks or sooner if needed  Please call with any questions or concerns       Medications " "adjusted this encounter:  Requested Prescriptions      No prescriptions requested or ordered in this encounter     No orders of the defined types were placed in this encounter  There are no discontinued medications  Erma Hartmann  was seen today for symptoms and planning cares related to above illnesses  I have reviewed the patient's controlled substance dispensing history in the Prescription Drug Monitoring Program in compliance with the [facility] regulations before prescribing any controlled substances  No refills    They are invited to continue to follow with us  If there are questions or concerns, please contact us through our clinic/answering service 24 hours a day, seven days a week  Cassie Hall PA-C  [facility] Palliative and Supportive Care        Visit Information    Accompanied By: Spouse    Source of History: Self, Medical record, spouse    History Limitations: None     Chief Complaint  No chief complaint on file  History of Present Illness      Erma Hartmann  is a 79 y o  male who presents in follow up of symptoms related to acute on chronic hypercapnic and hypoxic respiratory failure, end-stage COPD, heart failure with reduced EF  He was recently hospitalized from 4/30-5/10 due to acute exacerbation of COPD  He has had numerous exacerbations and hospitalizations over the past several months  During goals of care conversations during this hospitalization, hospice had been introduced and patient hoped to speak with palliative care outpatient prior to making this decision  Pertinent issues include: symptom management, assessment of goals of care, advance care planning    Per chart review, discharge summary from most recent hospitalization:  \"Patient was admitted due to shortness of breath and respiratory failure  Patient has known longstanding end-stage COPD  He follows closely with Dr Mich Gastelum of pulmonology as an outpatient    Unfortunately he has had numerous " "exacerbations and hospitalizations over the last several months  He was admitted initially to the ICU due to the severity of his respiratory failure  He was treated with IV steroids, scheduled nebulized breathing treatments, and increased supplemental oxygen  He also required BiPAP  Ultimately he was stabilized and transition to the floor  Goals of care were discussed at length with the patient and his wife  He is currently a DNR/DNI  He hopes to be able to maintain the status quo at home for as long as he can however he understands his terminal prognosis  We will continue on a prednisone taper and his nebulized regimen at home  I advised close outpatient follow-up with pulmonology and palliative care  Per patient and his wife, plan with next exacerbation is to return to the hospital, but to transition to palliative care and comfort measures at that time  Otherwise all questions and concerns were addressed  Patient was very anxious and eager to return home  \"       Upon my encounter today, patient is clear that he does wish to return to hospital for future hospitalizations  While he dislikes being in the hospital, feels \"I am not ready to go\" and wishes for everything to be done to keep him alive for as long as possible  Discussed alternatives including hospice to avoid future hospitalizations and focus on quality of life rather than quantity  He does not feel it is the right time for hospice  Discussed code status and likelihood of survival, risks vs benefits of CPR and intubation and he will take time to consider and readdress at next visit  He is provided advanced directives to review and will complete at next visit  Provided emotional support to patient and his wife  He states he is comfortable at this time and not dyspneic at rest  Does have dyspnea on exertion and anxiety associated with air hunger  He declines benzodiazepines for this anxiety and does not want additional pill burden   Denies any " other symptoms at this time including pain, nausea, vomiting, constipation, diarrhea  Also counseled on tobacco use and dangers of smoking while using home oxygen  Encouraged cessation  Patient and wife express understanding  Patient will follow up in 1 month or sooner if needed  Please call with any questions or concerns  Past medical, surgical, social, and family histories are reviewed and pertinent updates are made  Review of Systems   All other systems reviewed and are negative  Vital Signs    BP (!) 84/50 (BP Location: Left arm, Patient Position: Sitting, Cuff Size: Standard)   Pulse 76   Temp (!) 97 1 °F (36 2 °C) (Temporal)   Wt 46 8 kg (103 lb 2 8 oz)   SpO2 (!) 87%   BMI 20 15 kg/m²     Physical Exam and Objective Data  Physical Exam  Vitals reviewed  Constitutional:       General: He is not in acute distress  Appearance: Normal appearance  He is ill-appearing  He is not diaphoretic  Comments: Thin and chronically ill appearing   HENT:      Head: Normocephalic and atraumatic  Nose: Nose normal       Mouth/Throat:      Mouth: Mucous membranes are moist    Eyes:      Conjunctiva/sclera: Conjunctivae normal    Cardiovascular:      Rate and Rhythm: Normal rate  Pulses: Normal pulses  Pulmonary:      Effort: Pulmonary effort is normal  No respiratory distress  Abdominal:      General: Abdomen is flat  Palpations: Abdomen is soft  Musculoskeletal:         General: Normal range of motion  Skin:     General: Skin is warm and dry  Neurological:      General: No focal deficit present  Mental Status: He is alert and oriented to person, place, and time     Psychiatric:         Mood and Affect: Mood normal          Behavior: Behavior normal        Radiology and Laboratory:  I personally reviewed and interpreted the following results: 5/5/23 BMP    60 minutes was spent face to face with Tiffany Stanford  with greater than 50% of the time spent in counseling or coordination of care including discussions of etiology of diagnosis, prognosis of diagnosis, diagnostic results, impression, and recommendations, risks and benefits of treatment, instructions for disease self management, treatment instructions, follow up requirements and patient and family counseling/involvement in care  All of the patient's or agent's questions were answered during this discussion

## 2023-05-18 NOTE — ASSESSMENT & PLAN NOTE
-end stage COPD with acute flare, treated with steroid taper, nebs  -pt completing steroid taper down to baseline prednisone    Continue oxygen  -on azithromycin three times weekly  -bipap qhs  -patient and wife agree to full code at this time, prefers to have further discussion after Palliative Care consult at end of week

## 2023-05-18 NOTE — PATIENT INSTRUCTIONS
Continue your current medications  Follow up with palliative care tomorrow at your scheduled appointment  If you notice increased leg swelling, weight gain, or more shortness of breath than usual, please call us  Please weigh yourself every day (after emptying your bladder) and keep a detailed log of weights  Contact the Heart Failure program at [phone] if you gain 3 lbs overnight or 5 lbs in 5-7 days  Limit daily sodium/salt intake to 2000 mg daily to prevent fluid retention  Avoid canned foods, fast food/Chinese food, and processed meats (hot dogs, lunch meat, and sausage etc )  Caution with condiments  Limit fluid intake to 2000 mL or 2 liters (about 60-65 ounces) daily  Avoid electrolyte replacement drinks (such as Gatorade, Pedialyte, Propel, Liquid IV, etc )  Bring complete list of medications and log of daily weights to your follow-up appointment

## 2023-05-19 ENCOUNTER — OFFICE VISIT (OUTPATIENT)
Dept: PALLIATIVE MEDICINE | Facility: CLINIC | Age: 68
End: 2023-05-19

## 2023-05-19 VITALS
HEART RATE: 76 BPM | BODY MASS INDEX: 20.15 KG/M2 | WEIGHT: 103.17 LBS | DIASTOLIC BLOOD PRESSURE: 50 MMHG | OXYGEN SATURATION: 87 % | SYSTOLIC BLOOD PRESSURE: 84 MMHG | TEMPERATURE: 97.1 F

## 2023-05-19 DIAGNOSIS — E46 PROTEIN-CALORIE MALNUTRITION, UNSPECIFIED SEVERITY (HCC): ICD-10-CM

## 2023-05-19 DIAGNOSIS — I50.22 CHRONIC HFREF (HEART FAILURE WITH REDUCED EJECTION FRACTION) (HCC): ICD-10-CM

## 2023-05-19 DIAGNOSIS — C61 PROSTATE CANCER (HCC): Chronic | ICD-10-CM

## 2023-05-19 DIAGNOSIS — J43.9 PULMONARY EMPHYSEMA, UNSPECIFIED EMPHYSEMA TYPE (HCC): Chronic | ICD-10-CM

## 2023-05-19 DIAGNOSIS — Z71.89 GOALS OF CARE, COUNSELING/DISCUSSION: ICD-10-CM

## 2023-05-19 DIAGNOSIS — E44.0 MODERATE PROTEIN-CALORIE MALNUTRITION (HCC): ICD-10-CM

## 2023-05-19 DIAGNOSIS — R91.8 PULMONARY NODULES/LESIONS, MULTIPLE: Primary | ICD-10-CM

## 2023-05-21 DIAGNOSIS — J43.9 PULMONARY EMPHYSEMA, UNSPECIFIED EMPHYSEMA TYPE (HCC): Primary | Chronic | ICD-10-CM

## 2023-05-22 RX ORDER — IPRATROPIUM BROMIDE AND ALBUTEROL SULFATE 2.5; .5 MG/3ML; MG/3ML
SOLUTION RESPIRATORY (INHALATION)
Qty: 270 ML | Refills: 5 | Status: ON HOLD | OUTPATIENT
Start: 2023-05-22

## 2023-05-22 NOTE — TELEPHONE ENCOUNTER
Patients wife is calling, she asked if we can please refill this script for him as he is completely out   Please advise

## 2023-05-31 NOTE — PROGRESS NOTES
Advanced Heart Failure / Pulmonary Hypertension Outpatient Visit    Tommy Slater  79 y o  male   MRN: [id number]  Encounter: [id number]    Assessment:  Patient Active Problem List    Diagnosis Date Noted   • Moderate protein-calorie malnutrition ([place] [id number] ) 05/09/2023   • HLD (hyperlipidemia) 05/02/2023   • COPD exacerbation ([place] [id number] ) 04/28/2023   • Hyperglycemia 03/30/2023   • Physical deconditioning 02/21/2023   • Anemia 02/17/2023   • Chronic HFrEF (heart failure with reduced ejection fraction) ([place] [id number] ) 09/12/2022   • Protein-calorie malnutrition ([place] [address] ) 08/27/2022   • Pulmonary nodules/lesions, multiple 03/27/2022   • Prostate cancer ([place] [address] ) 05/24/2021   • BPH with obstruction/lower urinary tract symptoms 04/13/2021   • Goals of care, counseling/discussion 02/25/2021   • Chronic respiratory failure with hypoxia and hypercapnia ([place] [id number] ) 11/19/2020   • Macrocytosis without anemia 05/23/2019   • Other insomnia 02/18/2019   • Gastroesophageal reflux disease 01/05/2017   • Nonobstructive atherosclerosis of coronary artery 02/25/2016   • Mood disorder ([place] [address] ) 08/18/2015   • Impaired fasting glucose 02/19/2015   • Osteoporosis 10/14/2014   • Vitamin D deficiency 10/14/2014   • Nonischemic cardiomyopathy ([place] [address] ) 09/26/2014   • Left bundle-branch block 08/03/2013   • Osteoarthritis 08/03/2013   • KEVIN and COPD overlap syndrome ([place] [address] ) 07/29/2013   • Chronic obstructive pulmonary disease ([place] [address] ) 07/18/2012       Today's Plan:  • In respiratory distress at today's appointment  Confused, tachypneic  Likely severe COPD exacerbation  Patient is currently full code, confirmed with wife  • EMS called, transported by ambulance to [place] [address]  Plan:  Acute on Chronic HFrEF; LVEF 20%; NYHA III; ACC/AHA Stage C              Etiology: Nonischemic cardiomyopathy   Possible dyssynchrony from chronic LBBB   Despite QRS only being 120 ms, echo shows significant dyssynchrony   Recent drop in EF likely related to stress myopathy with acute exacerbation of COPD  Weighed 115 lb on 5/10, 105 lb on 5/16/23, 104 lb 5/18  Today, weighs 103 lbs      TTE 5/1/23: LVEF 20%  Severe global hypokinesis with regional variation  RV cavity size normal, systolic function normal  Trace MR, TR    TTE 2/21/23: LVEF 30%  LVIDd 6 6cm  severe global hypokinesis  Grade I DD  RV cavity size and systolic function normal  Mild TR  TTE 2/12/2023: LVEF 20-25%   Severe global hypokinesis with regional variation   Grade 1 DD   Abnormal septal motion consistent with LBBB   RV cavity mildly dilated, normal systolic function   Mild MR, mild TR   RVSP 38   Dilated IVC  TTE 8/26/2022: LVEF 40%   RV cavity mildly dilated   Systolic function normal   Mild MR, TR   Normal IVC      Neurohormonal Blockade:  --Beta Blocker: no  --ARNi / ACEi / Taras Smiles 12 5 mg QD, off   --Aldosterone Antagonist: no   --SGLT2 Inhibitor: no  --Home Diuretic: torsemide 20 mg QD     Sudden Cardiac Death Risk Reduction:  --ICD: LVEF newly reduced to 20-25%      Electrical Resynchronization:  --Candidacy for BiV device: QRSd 120ms, chronic LBBB with dyssynchrony on echocardiogram      Advanced Therapies (if appropriate): Not appropriate at this time, particularly considering advanced lung disease         COVID-19  Tested positive on 2/22/23    Nonobstructive CAD  Has coronary calcium on CT  On aspirin and statin  Hyperlipidemia  Continue statin  LDL 75 8/2021  COPD (end stage)  Former smoker; 105 pack years, quit in 2012  Severe disease, follows closely with pulm   On home oxygen, 3-4L  Multiple hospitalizations with acute COPD exacerbations   Management per pulm  KEVIN   On BiPAP nightly  GOC  Introduced to palliative care team this past admit  Has seen them recently outpatient           HPI:   Don Aneudy Jr  is a 74year-old male who presented to [facility] on 2/12/2023 with an acute COPD exacerbation requiring intubation  Extubated 2/15/23   PMH includes nonischemic cardiomyopathy, chronic systolic and diastolic heart failure, nonobstructive CAD, hyperlipidemia, severe COPD, KEVIN   Was previously admitted in 8/2022 for acute decompensated heart failure and COPD exacerbation   LVEF 40% at that time, down from prior of 45 to 50%, though previously EF had been 35% for years   Started on Lasix at that time  Shirley Whyte been euvolemic, though blood pressures have made it difficult to start/titrate up on GDMT   Recently saw Dr Caren Mccarthy in the office in December, at which time his Lasix was shifted to as needed and adequate oral intake and hydration were encouraged      He was reportedly short of breath for 2 days prior to presentation, with hallucinations   His wife called 911 and EMS intubated him in the field  [facility] was found to have an elevated procalcitonin on admission and started on azithromycin and ceftriaxone   He was given IV Lasix with good urine output   He was weaned off the ventilator and extubated on 2/15   Maintained on BiPAP overnight and has been receiving scheduled nebulizers with aggressive pulmonary hygiene  TTE on admission with LVEF 20 to 25%, down from 40% in 8/2022       He is also followed by pulmonology, who recommended consideration for home hospice for severe Catherine Raphael was seen by palliative care while inpatient here, and patient and family elected to make code status level 3 DNR/DNI   Pulmonology has been following him as well and has been managing steroids, bronchodilators, and nebulizer therapy  Per TH: 3/7/23  Presents for post hospital follow up with his wife  Just discharged from [address] [place] [address]  Feeling at his baseline  Gets SOB with minimal exertion  O2 sats in the low 80's on 3-4 L NC,  on initial assessment today  Has complaints that his feet are red and swollen today  Wants to keep fighting  Dreading another hospital admission       Diuretics increased after visit with Graciela Gaffney (Lasix increased to BID x 3 days )    Recently hospitalized from 4/30-5/10/23 for acute respiratory failure in the setting of COPD  He was admitted initially to the ICU and treated with IV steroids, scheduled nebulized breathing treatments, and increased supplemental oxygen  He also required BiPAP  Ultimately he was stabilized and transition to the floor  Goals of care were discussed at length with the patient and his wife  He is currently a DNR/DNI  Plan per chart review per patient and his wife; plan with next exacerbation is to return to the hospital, but to transition to palliative care and comfort measures at that time  Referred to home hospice as of 5/12/23, wife expressed they would like to speak to palliative on Friday before making a final decision  5/17/23: presents today for follow up  Feels at his baseline, O2 sats mid to high 90s on home 3L NC  Some LE swelling, improves with elevating his legs  Able to complete ADLs and move around the house, SOB not increased from his baseline  Meeting with palliative care tomorrow  Denies chest pain, palpitations, dizziness, syncope  Feels like he's urinating much more with torsemide than he did with Lasix  Weighs himself daily, small fluctuations but overall stable  6/1/23: presents today for follow up  In respiratory distress, confused, tachypneic  Wife reports this has been worsening since this morning; no recent trigger, fevers, chills  +LE swelling  Confirmed at today's appointment that patient is currently FULL CODE  EMS called, transported via ambulance to ED, transfer order placed      Past Medical History:   Diagnosis Date   • Acute metabolic encephalopathy 6/04/7786   • Arthritis    • Bladder mass    • Cardiomyopathy [facility])    • Chest pain    • COPD (chronic obstructive pulmonary disease) ([facility])    • COVID-19 virus infection 2/23/2023   • CPAP (continuous positive airway pressure) dependence    • Emphysema of lung ([facility])    • Hypoxia     nocturnal   • Left bundle branch block    • Multiple pulmonary nodules     last assessed: 10/12/16   • Pneumonia    • Sleep apnea    • Sleep apnea, obstructive    • Smoker    • Weight loss 8/29/2019     Review of Systems   Constitutional: Negative for chills and fever  HENT: Negative for ear pain  Eyes: Negative for pain  Respiratory: Positive for shortness of breath and wheezing  Cardiovascular: Positive for leg swelling  Negative for chest pain  Gastrointestinal: Negative for abdominal pain and vomiting  Genitourinary: Negative for dysuria and hematuria  Musculoskeletal: Negative for arthralgias and back pain  Skin: Negative for rash  Neurological: Negative for seizures  All other systems reviewed and are negative  14-point ROS completed and negative except as stated above and/or in the HPI        No Known Allergies    Current Outpatient Medications:   •  acetaminophen (TYLENOL) 325 mg tablet, Take 3 tablets (975 mg total) by mouth every 8 (eight) hours as needed for mild pain or moderate pain, Disp: , Rfl: 0  •  albuterol (PROVENTIL HFA,VENTOLIN HFA) 90 mcg/act inhaler, Inhale 2 puffs every 6 (six) hours as needed for wheezing, Disp: 8 5 g, Rfl: 11  •  azithromycin (ZITHROMAX) 250 mg tablet, Take 1 tablet (250 mg total) by mouth 3 (three) times a week, Disp: 12 tablet, Rfl: 0  •  cyanocobalamin (VITAMIN B-12) 1000 MCG tablet, Take 1 tablet (1,000 mcg total) by mouth daily, Disp: 30 tablet, Rfl: 0  •  Diclofenac Sodium (VOLTAREN) 1 %, APPLY 2 GRAMS 4 TIMES A DAY, Disp: , Rfl:   •  docusate sodium (COLACE) 100 mg capsule, Take 100 mg by mouth 2 (two) times a day, Disp: , Rfl:   •  ergocalciferol (VITAMIN D2) 50,000 units, TAKE 1 CAPSULE (50,000 UNITS TOTAL) BY MOUTH EVERY 28 DAYS, Disp: 3 capsule, Rfl: 1  •  formoterol (PERFOROMIST) 20 MCG/2ML nebulizer solution, TAKE 2 ML (20 MCG TOTAL) BY NEBULIZATION 2 (TWO) TIMES A DAY, Disp: , Rfl:   •  guaiFENesin (MUCINEX) 600 mg 12 hr tablet, Take 2 tablets (1,200 mg total) by mouth every 12 (twelve) hours, Disp: 60 tablet, Rfl: 6  •  ipratropium (ATROVENT) 0 02 % nebulizer solution, Take 2 5 mL (0 5 mg total) by nebulization 3 (three) times a day, Disp: 225 mL, Rfl: 5  •  ipratropium-albuterol (DUO-NEB) 0 5-2 5 mg/3 mL nebulizer solution, USE 1 VIAL VIA NEBULIZER 3 TIMES A DAY, Disp: 270 mL, Rfl: 5  •  levalbuterol (XOPENEX) 1 25 mg/0 5 mL nebulizer solution, Take 0 5 mL (1 25 mg total) by nebulization 3 (three) times a day, Disp: , Rfl: 0  •  Melatonin 5 MG TABS, Take 1 tablet by mouth daily at bedtime, Disp: , Rfl:   •  predniSONE 5 mg tablet, Take 1 tablet (5 mg total) by mouth daily Do not start before 2023 , Disp: , Rfl: 0  •  rosuvastatin (CRESTOR) 10 MG tablet, Take 1 tablet (10 mg total) by mouth daily, Disp: 90 tablet, Rfl: 3  •  tamsulosin (FLOMAX) 0 4 mg, Take 1 capsule (0 4 mg total) by mouth daily with dinner, Disp: , Rfl: 0  •  torsemide (DEMADEX) 20 mg tablet, Take 1 tablet (20 mg total) by mouth daily, Disp: 90 tablet, Rfl: 3  •  aspirin 81 mg chewable tablet, Chew 1 tablet (81 mg total) daily, Disp: 30 tablet, Rfl: 0  •  budesonide (PULMICORT) 0 5 mg/2 mL nebulizer solution, TAKE 2 ML (0 5 MG TOTAL) BY NEBULIZATION TWICE A DAY RINSE MOUTH AFTER USE, Disp: 360 mL, Rfl: 2  •  senna-docusate sodium (SENOKOT S) 8 6-50 mg per tablet, Take 1 tablet by mouth daily at bedtime (Patient not taking: Reported on 2023), Disp: , Rfl: 0    Social History     Socioeconomic History   • Marital status: /Civil Union     Spouse name: Not on file   • Number of children: Not on file   • Years of education: Not on file   • Highest education level: Not on file   Occupational History   • Not on file   Tobacco Use   • Smoking status: Former     Packs/day: 2 50     Years: 42 00     Total pack years: 105 00     Types: Cigarettes     Start date: 5     Quit date: 2012     Years since quittin 4   • Smokeless tobacco: Former   • Tobacco comments:     1 ppd for 37 years,  down to 5 cigs a day, is around second hand smoke   Vaping Use   • Vaping Use: Never used Substance and Sexual Activity   • Alcohol use: Not Currently     Comment: rarely   • Drug use: No   • Sexual activity: Not Currently     Partners: Female   Other Topics Concern   • Not on file   Social History Narrative    Daily coffee consumption: 8 or more cups a day        Used to work in [employer]  On disability  Social Determinants of Health     Financial Resource Strain: Not on file   Food Insecurity: No Food Insecurity (5/1/2023)    Hunger Vital Sign    • Worried About Running Out of Food in the Last Year: Never true    • Ran Out of Food in the Last Year: Never true   Transportation Needs: No Transportation Needs (5/1/2023)    PRAPARE - Transportation    • Lack of Transportation (Medical): No    • Lack of Transportation (Non-Medical): No   Physical Activity: Not on file   Stress: Not on file   Social Connections: Not on file   Intimate Partner Violence: Not on file   Housing Stability: Unknown (5/1/2023)    Housing Stability Vital Sign    • Unable to Pay for Housing in the Last Year: No    • Number of Places Lived in the Last Year: Not on file    • Unstable Housing in the Last Year: No     Family History   Problem Relation Age of Onset   • Diabetes Mother        Vitals:  Blood pressure (!) 86/50, pulse (!) 110, weight 46 7 kg (103 lb)  Body mass index is 20 12 kg/m²  Wt Readings from Last 10 Encounters:   06/01/23 46 7 kg (103 lb)   05/19/23 46 8 kg (103 lb 2 8 oz)   05/18/23 47 2 kg (104 lb)   05/16/23 48 kg (105 lb 12 8 oz)   05/10/23 52 4 kg (115 lb 9 6 oz)   04/13/23 48 5 kg (107 lb)   03/27/23 46 3 kg (102 lb)   03/23/23 45 8 kg (101 lb)   02/28/23 44 8 kg (98 lb 12 3 oz)   01/17/23 48 1 kg (106 lb)     Vitals:    06/01/23 1128   BP: (!) 86/50   BP Location: Left arm   Patient Position: Sitting   Cuff Size: Child   Pulse: (!) 110   Weight: 46 7 kg (103 lb)       Physical Exam  Constitutional:       General: He is in acute distress  Appearance: He is ill-appearing     HENT:      Head: "Normocephalic  Nose: Nose normal       Mouth/Throat:      Mouth: Mucous membranes are moist    Eyes:      Conjunctiva/sclera: Conjunctivae normal    Cardiovascular:      Rate and Rhythm: Normal rate and regular rhythm  Pulmonary:      Effort: Respiratory distress present  Breath sounds: Wheezing present  Comments: decreased air movement  Musculoskeletal:      Cervical back: Neck supple  Right lower le+ Pitting Edema present  Left lower le+ Pitting Edema present  Skin:     General: Skin is dry  Neurological:      General: No focal deficit present  Psychiatric:         Mood and Affect: Mood normal          Labs & Results:  Lab Results   Component Value Date    HCT 29 9 (L) 2023    HGB 9 5 (L) 2023     (H) 2023     2023    WBC 7 06 2023     Lab Results   Component Value Date    BUN 19 2023    CALCIUM 8 9 2023    CL 93 (L) 2023    CO2 40 (H) 2023    CREATININE 0 54 (L) 2023    GLUC 91 2023    K 3 5 2023    SODIUM 132 (L) 2023     Lab Results   Component Value Date    INR 0 93 2023    INR 0 95 2022    PROTIME 12 6 2023    PROTIME 12 3 2022     No results found for: \"BNP\"   Lab Results   Component Value Date    NTBNP 17,569 (H) 2023        Thank you for the opportunity to participate in the care of this patient      Rodman Dance, PA-C   "

## 2023-06-01 ENCOUNTER — OFFICE VISIT (OUTPATIENT)
Dept: CARDIOLOGY CLINIC | Facility: CLINIC | Age: 68
End: 2023-06-01

## 2023-06-01 ENCOUNTER — TELEPHONE (OUTPATIENT)
Dept: UROLOGY | Facility: MEDICAL CENTER | Age: 68
End: 2023-06-01

## 2023-06-01 ENCOUNTER — APPOINTMENT (EMERGENCY)
Dept: RADIOLOGY | Facility: HOSPITAL | Age: 68
DRG: 193 | End: 2023-06-01
Payer: COMMERCIAL

## 2023-06-01 ENCOUNTER — HOSPITAL ENCOUNTER (INPATIENT)
Facility: HOSPITAL | Age: 68
LOS: 11 days | Discharge: HOME HEALTH | DRG: 193 | End: 2023-06-12
Attending: EMERGENCY MEDICINE | Admitting: INTERNAL MEDICINE
Payer: COMMERCIAL

## 2023-06-01 VITALS
SYSTOLIC BLOOD PRESSURE: 86 MMHG | BODY MASS INDEX: 20.12 KG/M2 | WEIGHT: 103 LBS | HEART RATE: 110 BPM | DIASTOLIC BLOOD PRESSURE: 50 MMHG

## 2023-06-01 DIAGNOSIS — I50.22 CHRONIC SYSTOLIC HEART FAILURE (HCC): ICD-10-CM

## 2023-06-01 DIAGNOSIS — M79.673 FOOT PAIN: ICD-10-CM

## 2023-06-01 DIAGNOSIS — I50.20 SYSTOLIC HEART FAILURE, UNSPECIFIED HF CHRONICITY (HCC): ICD-10-CM

## 2023-06-01 DIAGNOSIS — J96.00 ACUTE RESPIRATORY FAILURE, UNSPECIFIED WHETHER WITH HYPOXIA OR HYPERCAPNIA (HCC): Primary | ICD-10-CM

## 2023-06-01 DIAGNOSIS — R06.89 HYPERCARBIA: ICD-10-CM

## 2023-06-01 DIAGNOSIS — J44.1 COPD EXACERBATION (HCC): ICD-10-CM

## 2023-06-01 DIAGNOSIS — J44.9 CHRONIC OBSTRUCTIVE PULMONARY DISEASE, UNSPECIFIED COPD TYPE (HCC): ICD-10-CM

## 2023-06-01 DIAGNOSIS — J44.1 COPD EXACERBATION (HCC): Primary | ICD-10-CM

## 2023-06-01 DIAGNOSIS — E87.1 HYPONATREMIA: ICD-10-CM

## 2023-06-01 PROBLEM — I50.9 ACUTE EXACERBATION OF CONGESTIVE HEART FAILURE (HCC): Status: ACTIVE | Noted: 2023-06-01

## 2023-06-01 LAB
2HR DELTA HS TROPONIN: -1 NG/L
ANION GAP SERPL CALCULATED.3IONS-SCNC: -3 MMOL/L (ref 4–13)
ANION GAP SERPL CALCULATED.3IONS-SCNC: -3 MMOL/L (ref 4–13)
ANION GAP SERPL CALCULATED.3IONS-SCNC: 2 MMOL/L (ref 4–13)
APTT PPP: 26 SECONDS (ref 23–37)
ATRIAL RATE: 109 BPM
BASE EX.OXY STD BLDV CALC-SCNC: 78.7 % (ref 60–80)
BASE EX.OXY STD BLDV CALC-SCNC: 83.4 % (ref 60–80)
BASE EX.OXY STD BLDV CALC-SCNC: 89.6 % (ref 60–80)
BASE EXCESS BLDV CALC-SCNC: 5.1 MMOL/L
BASE EXCESS BLDV CALC-SCNC: 8.1 MMOL/L
BASE EXCESS BLDV CALC-SCNC: 8.9 MMOL/L
BASOPHILS # BLD AUTO: 0.02 THOUSANDS/ÂΜL (ref 0–0.1)
BASOPHILS NFR BLD AUTO: 0 % (ref 0–1)
BNP SERPL-MCNC: 814 PG/ML (ref 0–100)
BUN SERPL-MCNC: 19 MG/DL (ref 5–25)
BUN SERPL-MCNC: 19 MG/DL (ref 5–25)
BUN SERPL-MCNC: 20 MG/DL (ref 5–25)
CALCIUM SERPL-MCNC: 8.4 MG/DL (ref 8.3–10.1)
CALCIUM SERPL-MCNC: 8.8 MG/DL (ref 8.3–10.1)
CALCIUM SERPL-MCNC: 8.9 MG/DL (ref 8.3–10.1)
CARDIAC TROPONIN I PNL SERPL HS: 27 NG/L
CARDIAC TROPONIN I PNL SERPL HS: 28 NG/L
CHLORIDE SERPL-SCNC: 84 MMOL/L (ref 96–108)
CHLORIDE SERPL-SCNC: 85 MMOL/L (ref 96–108)
CHLORIDE SERPL-SCNC: 85 MMOL/L (ref 96–108)
CO2 SERPL-SCNC: 36 MMOL/L (ref 21–32)
CO2 SERPL-SCNC: 40 MMOL/L (ref 21–32)
CO2 SERPL-SCNC: 41 MMOL/L (ref 21–32)
CREAT SERPL-MCNC: 0.67 MG/DL (ref 0.6–1.3)
CREAT SERPL-MCNC: 0.7 MG/DL (ref 0.6–1.3)
CREAT SERPL-MCNC: 0.74 MG/DL (ref 0.6–1.3)
EOSINOPHIL # BLD AUTO: 0 THOUSAND/ÂΜL (ref 0–0.61)
EOSINOPHIL NFR BLD AUTO: 0 % (ref 0–6)
ERYTHROCYTE [DISTWIDTH] IN BLOOD BY AUTOMATED COUNT: 12.3 % (ref 11.6–15.1)
GFR SERPL CREATININE-BSD FRML MDRD: 100 ML/MIN/1.73SQ M
GFR SERPL CREATININE-BSD FRML MDRD: 96 ML/MIN/1.73SQ M
GFR SERPL CREATININE-BSD FRML MDRD: 98 ML/MIN/1.73SQ M
GLUCOSE SERPL-MCNC: 134 MG/DL (ref 65–140)
GLUCOSE SERPL-MCNC: 134 MG/DL (ref 65–140)
GLUCOSE SERPL-MCNC: 143 MG/DL (ref 65–140)
HCO3 BLDV-SCNC: 34.8 MMOL/L (ref 24–30)
HCO3 BLDV-SCNC: 39 MMOL/L (ref 24–30)
HCO3 BLDV-SCNC: 39.3 MMOL/L (ref 24–30)
HCT VFR BLD AUTO: 37.7 % (ref 36.5–49.3)
HGB BLD-MCNC: 11.7 G/DL (ref 12–17)
IMM GRANULOCYTES # BLD AUTO: 0.05 THOUSAND/UL (ref 0–0.2)
IMM GRANULOCYTES NFR BLD AUTO: 1 % (ref 0–2)
INR PPP: 0.84 (ref 0.84–1.19)
LACTATE SERPL-SCNC: 1.7 MMOL/L (ref 0.5–2)
LYMPHOCYTES # BLD AUTO: 0.37 THOUSANDS/ÂΜL (ref 0.6–4.47)
LYMPHOCYTES NFR BLD AUTO: 5 % (ref 14–44)
MCH RBC QN AUTO: 31.5 PG (ref 26.8–34.3)
MCHC RBC AUTO-ENTMCNC: 31 G/DL (ref 31.4–37.4)
MCV RBC AUTO: 102 FL (ref 82–98)
MONOCYTES # BLD AUTO: 0.63 THOUSAND/ÂΜL (ref 0.17–1.22)
MONOCYTES NFR BLD AUTO: 8 % (ref 4–12)
NEUTROPHILS # BLD AUTO: 7.01 THOUSANDS/ÂΜL (ref 1.85–7.62)
NEUTS SEG NFR BLD AUTO: 86 % (ref 43–75)
NRBC BLD AUTO-RTO: 0 /100 WBCS
O2 CT BLDV-SCNC: 14.4 ML/DL
O2 CT BLDV-SCNC: 14.8 ML/DL
O2 CT BLDV-SCNC: 15.7 ML/DL
OSMOLALITY UR/SERPL-RTO: 272 MMOL/KG (ref 282–298)
P AXIS: 90 DEGREES
PCO2 BLDV: 81.1 MM HG (ref 42–50)
PCO2 BLDV: 87 MM HG (ref 42–50)
PCO2 BLDV: 96.4 MM HG (ref 42–50)
PH BLDV: 7.23 [PH] (ref 7.3–7.4)
PH BLDV: 7.25 [PH] (ref 7.3–7.4)
PH BLDV: 7.27 [PH] (ref 7.3–7.4)
PLATELET # BLD AUTO: 173 THOUSANDS/UL (ref 149–390)
PLATELET # BLD AUTO: 178 THOUSANDS/UL (ref 149–390)
PMV BLD AUTO: 9.4 FL (ref 8.9–12.7)
PMV BLD AUTO: 9.4 FL (ref 8.9–12.7)
PO2 BLDV: 52.3 MM HG (ref 35–45)
PO2 BLDV: 55 MM HG (ref 35–45)
PO2 BLDV: 81 MM HG (ref 35–45)
POTASSIUM SERPL-SCNC: 4.5 MMOL/L (ref 3.5–5.3)
POTASSIUM SERPL-SCNC: 4.5 MMOL/L (ref 3.5–5.3)
POTASSIUM SERPL-SCNC: 4.7 MMOL/L (ref 3.5–5.3)
PR INTERVAL: 150 MS
PROCALCITONIN SERPL-MCNC: 0.07 NG/ML
PROTHROMBIN TIME: 11.7 SECONDS (ref 11.6–14.5)
QRS AXIS: 91 DEGREES
QRSD INTERVAL: 122 MS
QT INTERVAL: 316 MS
QTC INTERVAL: 411 MS
RBC # BLD AUTO: 3.71 MILLION/UL (ref 3.88–5.62)
SODIUM SERPL-SCNC: 121 MMOL/L (ref 135–147)
SODIUM SERPL-SCNC: 123 MMOL/L (ref 135–147)
SODIUM SERPL-SCNC: 123 MMOL/L (ref 135–147)
T WAVE AXIS: -88 DEGREES
VENTRICULAR RATE: 102 BPM
WBC # BLD AUTO: 8.08 THOUSAND/UL (ref 4.31–10.16)

## 2023-06-01 PROCEDURE — 85025 COMPLETE CBC W/AUTO DIFF WBC: CPT | Performed by: EMERGENCY MEDICINE

## 2023-06-01 PROCEDURE — 82805 BLOOD GASES W/O2 SATURATION: CPT

## 2023-06-01 PROCEDURE — 93010 ELECTROCARDIOGRAM REPORT: CPT | Performed by: INTERNAL MEDICINE

## 2023-06-01 PROCEDURE — 83935 ASSAY OF URINE OSMOLALITY: CPT | Performed by: STUDENT IN AN ORGANIZED HEALTH CARE EDUCATION/TRAINING PROGRAM

## 2023-06-01 PROCEDURE — 80048 BASIC METABOLIC PNL TOTAL CA: CPT | Performed by: EMERGENCY MEDICINE

## 2023-06-01 PROCEDURE — 94664 DEMO&/EVAL PT USE INHALER: CPT

## 2023-06-01 PROCEDURE — 85049 AUTOMATED PLATELET COUNT: CPT

## 2023-06-01 PROCEDURE — 83880 ASSAY OF NATRIURETIC PEPTIDE: CPT | Performed by: EMERGENCY MEDICINE

## 2023-06-01 PROCEDURE — 99223 1ST HOSP IP/OBS HIGH 75: CPT | Performed by: INTERNAL MEDICINE

## 2023-06-01 PROCEDURE — 84484 ASSAY OF TROPONIN QUANT: CPT | Performed by: EMERGENCY MEDICINE

## 2023-06-01 PROCEDURE — 96365 THER/PROPH/DIAG IV INF INIT: CPT

## 2023-06-01 PROCEDURE — 80048 BASIC METABOLIC PNL TOTAL CA: CPT

## 2023-06-01 PROCEDURE — 93005 ELECTROCARDIOGRAM TRACING: CPT

## 2023-06-01 PROCEDURE — 71045 X-RAY EXAM CHEST 1 VIEW: CPT

## 2023-06-01 PROCEDURE — 87040 BLOOD CULTURE FOR BACTERIA: CPT | Performed by: EMERGENCY MEDICINE

## 2023-06-01 PROCEDURE — 83605 ASSAY OF LACTIC ACID: CPT | Performed by: EMERGENCY MEDICINE

## 2023-06-01 PROCEDURE — 94760 N-INVAS EAR/PLS OXIMETRY 1: CPT

## 2023-06-01 PROCEDURE — 85730 THROMBOPLASTIN TIME PARTIAL: CPT | Performed by: EMERGENCY MEDICINE

## 2023-06-01 PROCEDURE — 84145 PROCALCITONIN (PCT): CPT | Performed by: EMERGENCY MEDICINE

## 2023-06-01 PROCEDURE — 94644 CONT INHLJ TX 1ST HOUR: CPT

## 2023-06-01 PROCEDURE — 83930 ASSAY OF BLOOD OSMOLALITY: CPT | Performed by: STUDENT IN AN ORGANIZED HEALTH CARE EDUCATION/TRAINING PROGRAM

## 2023-06-01 PROCEDURE — 84300 ASSAY OF URINE SODIUM: CPT | Performed by: STUDENT IN AN ORGANIZED HEALTH CARE EDUCATION/TRAINING PROGRAM

## 2023-06-01 PROCEDURE — 94640 AIRWAY INHALATION TREATMENT: CPT

## 2023-06-01 PROCEDURE — 36415 COLL VENOUS BLD VENIPUNCTURE: CPT | Performed by: EMERGENCY MEDICINE

## 2023-06-01 PROCEDURE — 99285 EMERGENCY DEPT VISIT HI MDM: CPT

## 2023-06-01 PROCEDURE — 94002 VENT MGMT INPAT INIT DAY: CPT

## 2023-06-01 PROCEDURE — 82805 BLOOD GASES W/O2 SATURATION: CPT | Performed by: EMERGENCY MEDICINE

## 2023-06-01 PROCEDURE — 85610 PROTHROMBIN TIME: CPT | Performed by: EMERGENCY MEDICINE

## 2023-06-01 RX ORDER — LEVALBUTEROL INHALATION SOLUTION 1.25 MG/3ML
1.25 SOLUTION RESPIRATORY (INHALATION)
Status: DISCONTINUED | OUTPATIENT
Start: 2023-06-01 | End: 2023-06-12 | Stop reason: HOSPADM

## 2023-06-01 RX ORDER — AZITHROMYCIN 250 MG/1
250 TABLET, FILM COATED ORAL 3 TIMES WEEKLY
Status: DISCONTINUED | OUTPATIENT
Start: 2023-06-02 | End: 2023-06-01

## 2023-06-01 RX ORDER — FORMOTEROL FUMARATE 20 UG/2ML
20 SOLUTION RESPIRATORY (INHALATION)
Status: DISCONTINUED | OUTPATIENT
Start: 2023-06-01 | End: 2023-06-12 | Stop reason: HOSPADM

## 2023-06-01 RX ORDER — LANOLIN ALCOHOL/MO/W.PET/CERES
5 CREAM (GRAM) TOPICAL
Status: DISCONTINUED | OUTPATIENT
Start: 2023-06-01 | End: 2023-06-02

## 2023-06-01 RX ORDER — CHLORHEXIDINE GLUCONATE 0.12 MG/ML
15 RINSE ORAL EVERY 12 HOURS SCHEDULED
Status: DISCONTINUED | OUTPATIENT
Start: 2023-06-01 | End: 2023-06-12 | Stop reason: HOSPADM

## 2023-06-01 RX ORDER — ACETAMINOPHEN 325 MG/1
975 TABLET ORAL EVERY 8 HOURS PRN
Status: DISCONTINUED | OUTPATIENT
Start: 2023-06-01 | End: 2023-06-12 | Stop reason: HOSPADM

## 2023-06-01 RX ORDER — FUROSEMIDE 10 MG/ML
40 INJECTION INTRAMUSCULAR; INTRAVENOUS ONCE
Status: COMPLETED | OUTPATIENT
Start: 2023-06-01 | End: 2023-06-01

## 2023-06-01 RX ORDER — FUROSEMIDE 10 MG/ML
20 INJECTION INTRAMUSCULAR; INTRAVENOUS ONCE
Status: DISCONTINUED | OUTPATIENT
Start: 2023-06-01 | End: 2023-06-01

## 2023-06-01 RX ORDER — TORSEMIDE 20 MG/1
20 TABLET ORAL DAILY
Status: DISCONTINUED | OUTPATIENT
Start: 2023-06-02 | End: 2023-06-01

## 2023-06-01 RX ORDER — GUAIFENESIN 600 MG/1
1200 TABLET, EXTENDED RELEASE ORAL EVERY 12 HOURS SCHEDULED
Status: DISCONTINUED | OUTPATIENT
Start: 2023-06-02 | End: 2023-06-12 | Stop reason: HOSPADM

## 2023-06-01 RX ORDER — ENOXAPARIN SODIUM 100 MG/ML
40 INJECTION SUBCUTANEOUS DAILY
Status: DISCONTINUED | OUTPATIENT
Start: 2023-06-02 | End: 2023-06-12 | Stop reason: HOSPADM

## 2023-06-01 RX ORDER — METHYLPREDNISOLONE SOD SUCC 125 MG
1 VIAL (EA) INJECTION ONCE
Status: COMPLETED | OUTPATIENT
Start: 2023-06-01 | End: 2023-06-01

## 2023-06-01 RX ORDER — PREDNISONE 5 MG/1
5 TABLET ORAL DAILY
Status: DISCONTINUED | OUTPATIENT
Start: 2023-06-02 | End: 2023-06-01

## 2023-06-01 RX ORDER — SODIUM CHLORIDE FOR INHALATION 0.9 %
3 VIAL, NEBULIZER (ML) INHALATION ONCE
Status: COMPLETED | OUTPATIENT
Start: 2023-06-01 | End: 2023-06-01

## 2023-06-01 RX ORDER — IPRATROPIUM BROMIDE AND ALBUTEROL SULFATE .5; 3 MG/3ML; MG/3ML
1 SOLUTION RESPIRATORY (INHALATION) ONCE
Status: COMPLETED | OUTPATIENT
Start: 2023-06-01 | End: 2023-06-01

## 2023-06-01 RX ORDER — TORSEMIDE 20 MG/1
20 TABLET ORAL DAILY
Status: DISCONTINUED | OUTPATIENT
Start: 2023-06-03 | End: 2023-06-01

## 2023-06-01 RX ORDER — METHYLPREDNISOLONE SODIUM SUCCINATE 40 MG/ML
40 INJECTION, POWDER, LYOPHILIZED, FOR SOLUTION INTRAMUSCULAR; INTRAVENOUS ONCE
Status: COMPLETED | OUTPATIENT
Start: 2023-06-02 | End: 2023-06-02

## 2023-06-01 RX ORDER — BUDESONIDE 0.5 MG/2ML
0.5 INHALANT ORAL
Status: DISCONTINUED | OUTPATIENT
Start: 2023-06-02 | End: 2023-06-12 | Stop reason: HOSPADM

## 2023-06-01 RX ORDER — ATORVASTATIN CALCIUM 40 MG/1
40 TABLET, FILM COATED ORAL
Status: DISCONTINUED | OUTPATIENT
Start: 2023-06-01 | End: 2023-06-12 | Stop reason: HOSPADM

## 2023-06-01 RX ORDER — ALBUTEROL SULFATE 2.5 MG/3ML
2 SOLUTION RESPIRATORY (INHALATION) ONCE
Status: COMPLETED | OUTPATIENT
Start: 2023-06-01 | End: 2023-06-01

## 2023-06-01 RX ORDER — DOCUSATE SODIUM 100 MG/1
100 CAPSULE, LIQUID FILLED ORAL 2 TIMES DAILY
Status: DISCONTINUED | OUTPATIENT
Start: 2023-06-02 | End: 2023-06-12 | Stop reason: HOSPADM

## 2023-06-01 RX ORDER — TAMSULOSIN HYDROCHLORIDE 0.4 MG/1
0.4 CAPSULE ORAL
Status: DISCONTINUED | OUTPATIENT
Start: 2023-06-02 | End: 2023-06-12 | Stop reason: HOSPADM

## 2023-06-01 RX ADMIN — Medication 3 ML: at 12:18

## 2023-06-01 RX ADMIN — CHLORHEXIDINE GLUCONATE 15 ML: 1.2 SOLUTION ORAL at 22:58

## 2023-06-01 RX ADMIN — IPRATROPIUM BROMIDE 1 MG: 0.5 SOLUTION RESPIRATORY (INHALATION) at 12:18

## 2023-06-01 RX ADMIN — IPRATROPIUM BROMIDE 0.5 MG: 0.5 SOLUTION RESPIRATORY (INHALATION) at 19:13

## 2023-06-01 RX ADMIN — FORMOTEROL FUMARATE DIHYDRATE 20 MCG: 20 SOLUTION RESPIRATORY (INHALATION) at 19:13

## 2023-06-01 RX ADMIN — FUROSEMIDE 40 MG: 10 INJECTION, SOLUTION INTRAMUSCULAR; INTRAVENOUS at 17:17

## 2023-06-01 RX ADMIN — CEFTRIAXONE SODIUM 2000 MG: 10 INJECTION, POWDER, FOR SOLUTION INTRAVENOUS at 14:42

## 2023-06-01 RX ADMIN — SODIUM CHLORIDE 250 ML: 0.9 INJECTION, SOLUTION INTRAVENOUS at 14:43

## 2023-06-01 RX ADMIN — ALBUTEROL SULFATE 10 MG: 2.5 SOLUTION RESPIRATORY (INHALATION) at 12:18

## 2023-06-01 RX ADMIN — LEVALBUTEROL HYDROCHLORIDE 1.25 MG: 1.25 SOLUTION RESPIRATORY (INHALATION) at 19:13

## 2023-06-01 NOTE — SEPSIS NOTE
"  Sepsis Note   Ashlyn Herrera  79 y o  male MRN: [id number]  Unit/Bed#: ED 21 Encounter: [id number]       Initial Sepsis Screening     Row Name 06/01/23 1343 06/01/23 1230             Is the patient's history suggestive of a new or worsening infection? Yes (Proceed)  -AR No  -AR       Suspected source of infection pneumonia  -AR --       Indicate SIRS criteria Tachycardia > 90 bpm;Tachypnea > 20 resp per min  -AR --       Are two or more of the above signs & symptoms of infection both present and new to the patient? Yes (Proceed)  -AR --       Assess for evidence of organ dysfunction: Are any of the below criteria present within 6 hours of suspected infection and SIRS criteria that are NOT considered to be chronic conditions? SBP < 90  -AR --       Date of presentation of septic shock -- --       Time of presentation of septic shock -- --       Fluid Resuscitation: A lesser volume than 30 ml/kg IV fluid will be given  -AR --       The 30 mL/kg fluid bolus was not given to the patient despite having hypotension, a lactate of >= 4 mmol/L, or documentation of septic shock secondary to: Concern for fluid/volume overload;Heart Failure  -AR --       Instead of the 30 ml/kg fluid bolus, the following volume of crystalloid fluid will be ordered: 250 ml  0 - BP improved without treatment  -AR --       Of the following fluid type: NSS  -AR --       Is the patient is persistently hypotensive in the hour after fluid bolus administration? If yes, patient meets criteria for vasopressor use  -- --       Sepsis Note: Click \"NEXT\" below (NOT \"close\") to generate sepsis note based on above information  YES (proceed by clicking \"NEXT\")  -AR --             User Key  (r) = Recorded By, (t) = Taken By, (c) = Cosigned By    [address] Name Provider Type    AR Sandi Luis DO Physician                    There is no height or weight on file to calculate BMI    Wt Readings from Last 1 Encounters:   06/01/23 46 7 kg (103 lb)        Ideal " body weight: 53 5 kg (117 lb 14 oz)

## 2023-06-01 NOTE — H&P
H&P Exam - [address]  79 y o  male MRN: [id number]  Unit/Bed#: MICU 11 Encounter: [id number]      -------------------------------------------------------------------------------------------------------------  Chief Complaint: Respiratory distress    History of Present Illness   HX and PE limited by: Bipap, somnolence  Yvette Gaspar  is a 79 y o  male with PMH of COPD (FEV1 22%) chronically on 2L, KEVIN on CPAP at night, chronic HFrEF (EF 20%), osteoporosis, prostate CA s/p TURP, GERD, anemia, HLD who presents from his Heart Failure office for increasing respiratory distress and confusion since this morning  Patient given 125 mg Solu-medrol, albuterol, and atrovent neb en route  He was immediately placed on BiPAP on arrival  BP fell from 132/77 to 86/53 after 30 minutes  , CXR wet read appears as bilateral congestion   VBG pH 7 228, pCO2 96 4, pO2 52 3, HCO 39 3 --> pH 7 250, pCO2 81 1, pO2 81 0, HCO3 34 8 after ~90 minutes on BiPAP  Patient given 250 cc bolus NS and 1 dose of Rocephin in ED  Patient seen by Palliative Care in the ED and wife confirms patient is Level 1 full code, as was confirmed at his Heart Failure visit this AM  He was admitted and intubated in February for COPD exacerbation  He was extubated after 3 days  Patient and wife seen at the bedside by Critical Care team  Per wife, patient had some dry cough yesterday but was otherwise well  This AM she noted him to have worsening SOB and having difficulty ambulating  He reported feeling hot and she noted him to have some nonspecific shaking, as if he were having chills  He did not complain of any symptoms to her  She feels his legs have become increasingly swollen      History obtained from chart review and wife   -------------------------------------------------------------------------------------------------------------  Assessment and Plan:    Neuro:   • Diagnosis: Acute metabolic metabolic encephalopathy  o 2/2 hypercapnia  o Plan:  - CAM-ICU  - Delirium precautions  - Treatment of underlying metabolic derangement  • Diagnosis: Analgesia  o Plan:  - Tylenol PRN      CV:   • Diagnosis: Acute on chronic HFrEF  o Per outpatient HF note on morning of admission, current diuretic is torsemide 20 mg daily  o Last echo 5/1/23: EF 20%, severe global hypokinesis, RVSP 30 00  o   o Plan:   - IV Lasix 40 mg x1  - Continue torsemide 20 mg qd tomorrow unless requiring additional IV doses  • Diagnosis: Hypotension  - Unclear etiology  - Plan:  • Continue to monitor  • Avoid fluids as patient is hypervolemic  • Diagnosis: Nonobstructive CAD  o Trop 28 --> 27  o Plan:  - Cont ASA, statin  • Diagnosis: HLD  o Plan:  - Atorvastatin 40 mg qd      Pulm:  • Diagnosis: Acute on chronic hypercapnic respiratory failure, chronically on 2L and CPAP at night  o Suspect heart failure exacerbation most likely, also possibly COPD exacerbation and less likely pneumonia  o Plan:  - IV Lasix 40 mg x1 now  - Given 1 dose IV ceftriaxone in ED - monitor off of ABX at this time  - Xopenex, atrovent, pulmicort, budesonide, mucinex  - Continue home azithromycin 250 mg MWF - will consider 3 days consecutive azithromycin 500 mg for COPD exacerbation  - Continue home prednisone 5 mg qd (was given 125 mg solu-medrol en route) - will consider prednisone for COPD exacerbation  - BiPAP - trend blood gas  • Diagnosis: COPD  o Plan:  - Plan as above  • Diagnosis: KEVIN on CPAP      GI:   • Diagnosis: GERD  o Plan:  - Not on PPI/H2RA at home  • Diagnosis: Bowel regimen  o Plan:  - Colace      :   • Diagnosis: Prostate CA s/p TURP  o Plan:  - Continue Flomax  • Diagnosis: Hyponatremia  o Na 121 on admission, acute  o Suspect due to CHF as patient is hypervolemic  o Plan:  - Check sodium studies  - Diurese as above  - Continue to trend      F/E/N:   F: none  E: Goal K>4, Mg>2, Phos>3  N: NPO for continuous BiPAP      Heme/Onc:   • Diagnosis: Normocytic/macrocytic anemia  o Baseline 8-9s  o Plan:  - Continue to trend  - Transfuse Hgb <7      Endo:   • Diagnosis: Osteoporosis  o Plan:  - Was on bisphosphonate in the past, does not appear to be on them now      ID:   • Diagnosis: Low suspicion for pneumonia  o T 99 6, procal 0 07, WBC 8 08, dry cough, no increase in sputum production  o Plan:  - Monitor off ABX  - Repeat procal in AM  - On ppx Azithromycin 250 mg MWF      MSK/Skin:   • Diagnosis: Ambulatory dysfunction  o Chronically uses a walker and sometimes a wheelchair  o Wife reports he needed wheelchair this morning for increased difficulty ambulating  o Plan:  - Frequent repositioning  - PT/OT when appropriate      Disposition: Admit to Critical Care   Code Status: Level 1 - Full Code  --------------------------------------------------------------------------------------------------------------  Review of Systems    Review of systems was unable to be performed secondary to somnolence, BiPAP    Physical Exam  Constitutional:       General: He is in acute distress  Appearance: He is cachectic  He is ill-appearing  Interventions: Face mask in place  HENT:      Mouth/Throat:      Comments: Edentulous  Cardiovascular:      Comments: Heart sounds distant  Pulmonary:      Breath sounds: Examination of the right-lower field reveals wheezing  Examination of the left-lower field reveals wheezing  Wheezing present  Abdominal:      Tenderness: There is abdominal tenderness in the epigastric area  Comments: Tense in upper abdomen   Musculoskeletal:      Right lower le+ Pitting Edema present  Left lower le+ Pitting Edema present  Skin:     General: Skin is dry     Neurological:      Comments: Somnolent  Withdrawing to pain         --------------------------------------------------------------------------------------------------------------  Vitals:   Vitals:    23 1500 23 1515 23 1530 23 1613   BP: 111/71 98/60 92/61    BP Location:       Pulse: 82 82 82    Resp: 22 18 (!) 23    Temp:       TempSrc:       SpO2: 100% 100% 99% 99%     Temp  Min: 99 6 °F (37 6 °C)  Max: 99 6 °F (37 6 °C)        There is no height or weight on file to calculate BMI  N/A    Laboratory and Diagnostics:  Results from last 7 days   Lab Units 06/01/23  1220   EOS PCT % 0   HEMATOCRIT % 37 7   HEMOGLOBIN g/dL 11 7*   MONOS PCT % 8   NEUTROS PCT % 86*   PLATELETS Thousands/uL 178   WBC Thousand/uL 8 08     Results from last 7 days   Lab Units 06/01/23  1220   ANION GAP mmol/L -3*   BUN mg/dL 19   CALCIUM mg/dL 8 9   CHLORIDE mmol/L 84*   CO2 mmol/L 40*   CREATININE mg/dL 0 67   GLUCOSE RANDOM mg/dL 143*   POTASSIUM mmol/L 4 5   SODIUM mmol/L 121*          Results from last 7 days   Lab Units 06/01/23  1245   INR  0 84   PTT seconds 26          Results from last 7 days   Lab Units 06/01/23  1245   LACTIC ACID mmol/L 1 7     ABG:    VBG:  Results from last 7 days   Lab Units 06/01/23  1358   BASE EXC JHONATHAN mmol/L 5 1   HCO3 JHONATHAN mmol/L 34 8*   PCO2 JHONATHAN mm Hg 81 1*   PH JHONATHAN  7 250*   PO2 JHONATHAN mm Hg 81 0*     Results from last 7 days   Lab Units 06/01/23  1220   PROCALCITONIN ng/ml 0 07       Micro:        EKG: Sinus tachy, right atrial enlargement, poor R wave progression, TWI in lateral leads  Imaging: I have personally reviewed pertinent reports        Historical Information   Past Medical History:   Diagnosis Date   • Acute metabolic encephalopathy 1/35/4737   • Arthritis    • Bladder mass    • Cardiomyopathy [facility])    • Chest pain    • COPD (chronic obstructive pulmonary disease) (HCC)    • COVID-19 virus infection 2/23/2023   • CPAP (continuous positive airway pressure) dependence    • Emphysema of lung (HCC)    • Hypoxia     nocturnal   • Left bundle branch block    • Multiple pulmonary nodules     last assessed: 10/12/16   • Pneumonia    • Sleep apnea    • Sleep apnea, obstructive    • Smoker    • Weight loss 8/29/2019     Past Surgical History:   Procedure Laterality Date   • COLONOSCOPY     • CYSTOSCOPY     • CYSTOSCOPY  2021   • VT BLADDER INSTILLATION ANTICARCINOGENIC AGENT N/A 1/3/2020    Procedure: INSTILLATION MITOMYCIN;  Surgeon: Phuc Navarro MD;  Location: [facility];  Service: Urology   • VT CYSTO W/REMOVAL OF LESIONS SMALL N/A 1/3/2020    Procedure: TRANSURETHRAL RESECTION OF BLADDER TUMOR (TURBT);   Surgeon: Phuc Navarro MD;  [facility];  Service: Urology   • VT CYSTOURETHROSCOPY WITH BIOPSY N/A 2021    Procedure: Stanley Gomes;  Surgeon: Phuc Navarro MD;  [facility];  Service: Urology   • VT TRURL ELECTROSURG [facility] PROSTATE BLEED COMPLETE N/A 2021    Procedure: TRANSURETHRAL RESECTION OF PROSTATE (TURP) BLADDER BIOPSY, FULGURATION;  Surgeon: Phuc Navarro MD;  [facility];  Service: Urology     Social History   Social History     Substance and Sexual Activity   Alcohol Use Not Currently    Comment: rarely     Social History     Substance and Sexual Activity   Drug Use No     Social History     Tobacco Use   Smoking Status Former   • Packs/day: 2 50   • Years: 42 00   • Total pack years: 105 00   • Types: Cigarettes   • Start date:    • Quit date:    • Years since quittin 4   Smokeless Tobacco Former   Tobacco Comments    1 ppd for 37 years,  down to 5 cigs a day, is around second hand smoke     Family History:   Family History   Problem Relation Age of Onset   • Diabetes Mother      I have reviewed this patient's family history and commented on sigificant items within the HPI      Medications:  Current Facility-Administered Medications   Medication Dose Route Frequency   • acetaminophen (TYLENOL) tablet 975 mg  975 mg Oral Q8H PRN   • [START ON 2023] aspirin (ECOTRIN LOW STRENGTH) EC tablet 81 mg  81 mg Oral Daily   • atorvastatin (LIPITOR) tablet 40 mg  40 mg Oral Daily With Dinner   • [START ON 2023] azithromycin (ZITHROMAX) tablet 250 mg  250 mg Oral Once per day on Mon Wed Fri   • [START ON 6/2/2023] budesonide (PULMICORT) inhalation solution 0 5 mg  0 5 mg Nebulization Daily   • chlorhexidine (PERIDEX) 0 12 % oral rinse 15 mL  15 mL Mouth/Throat Q12H [address]   • [START ON 6/2/2023] docusate sodium (COLACE) capsule 100 mg  100 mg Oral BID   • [START ON 6/2/2023] enoxaparin (LOVENOX) subcutaneous injection 40 mg  40 mg Subcutaneous Daily   • formoterol (PERFOROMIST) nebulizer solution 20 mcg  20 mcg Nebulization Q12H   • furosemide (LASIX) injection 40 mg  40 mg Intravenous Once   • [START ON 6/2/2023] guaiFENesin (MUCINEX) 12 hr tablet 1,200 mg  1,200 mg Oral Q12H [address]   • ipratropium (ATROVENT) 0 02 % inhalation solution 0 5 mg  0 5 mg Nebulization TID   • levalbuterol (XOPENEX) inhalation solution 1 25 mg  1 25 mg Nebulization TID   • melatonin tablet 4 5 mg  4 5 mg Oral HS   • [START ON 6/2/2023] predniSONE tablet 5 mg  5 mg Oral Daily   • [START ON 6/2/2023] tamsulosin (FLOMAX) capsule 0 4 mg  0 4 mg Oral Daily With Dinner   • [START ON 6/2/2023] torsemide (DEMADEX) tablet 20 mg  20 mg Oral Daily     Home medications:  Prior to Admission Medications   Prescriptions Last Dose Informant Patient Reported? Taking?    Diclofenac Sodium (VOLTAREN) 1 %  Spouse/Significant Other, Self Yes No   Sig: APPLY 2 GRAMS 4 TIMES A DAY   Melatonin 5 MG TABS  Spouse/Significant Other, Self Yes No   Sig: Take 1 tablet by mouth daily at bedtime   acetaminophen (TYLENOL) 325 mg tablet  Spouse/Significant Other, Self No No   Sig: Take 3 tablets (975 mg total) by mouth every 8 (eight) hours as needed for mild pain or moderate pain   albuterol (PROVENTIL HFA,VENTOLIN HFA) 90 mcg/act inhaler  Spouse/Significant Other, Self No No   Sig: Inhale 2 puffs every 6 (six) hours as needed for wheezing   aspirin 81 mg chewable tablet  Self, Spouse/Significant Other No No   Sig: Chew 1 tablet (81 mg total) daily   azithromycin (ZITHROMAX) 250 mg tablet  Self, Spouse/Significant Other No No   Sig: Take 1 tablet (250 mg total) by mouth 3 (three) times a week   budesonide (PULMICORT) 0 5 mg/2 mL nebulizer solution  Self, Spouse/Significant Other No No   Sig: TAKE 2 ML (0 5 MG TOTAL) BY NEBULIZATION TWICE A DAY RINSE MOUTH AFTER USE   cyanocobalamin (VITAMIN B-12) 1000 MCG tablet  Self, Spouse/Significant Other No No   Sig: Take 1 tablet (1,000 mcg total) by mouth daily   docusate sodium (COLACE) 100 mg capsule  Spouse/Significant Other, Self Yes No   Sig: Take 100 mg by mouth 2 (two) times a day   ergocalciferol (VITAMIN D2) 50,000 units  Self, Spouse/Significant Other No No   Sig: TAKE 1 CAPSULE (50,000 UNITS TOTAL) BY MOUTH EVERY 28 DAYS   formoterol (PERFOROMIST) 20 MCG/2ML nebulizer solution  Self, Spouse/Significant Other Yes No   Sig: TAKE 2 ML (20 MCG TOTAL) BY NEBULIZATION 2 (TWO) TIMES A DAY   furosemide (LASIX) 20 mg tablet   Yes No   Sig: Take 10 mg by mouth daily   guaiFENesin (MUCINEX) 600 mg 12 hr tablet  Self, Spouse/Significant Other No No   Sig: Take 2 tablets (1,200 mg total) by mouth every 12 (twelve) hours   ipratropium (ATROVENT) 0 02 % nebulizer solution  Self, Spouse/Significant Other No No   Sig: Take 2 5 mL (0 5 mg total) by nebulization 3 (three) times a day   ipratropium-albuterol (DUO-NEB) 0 5-2 5 mg/3 mL nebulizer solution   No No   Sig: USE 1 VIAL VIA NEBULIZER 3 TIMES A DAY   levalbuterol (XOPENEX) 1 25 mg/0 5 mL nebulizer solution  Spouse/Significant Other, Self No No   Sig: Take 0 5 mL (1 25 mg total) by nebulization 3 (three) times a day   predniSONE 5 mg tablet  Spouse/Significant Other, Self No No   Sig: Take 1 tablet (5 mg total) by mouth daily Do not start before March 5, 2023     rosuvastatin (CRESTOR) 10 MG tablet  Spouse/Significant Other, Self No No   Sig: Take 1 tablet (10 mg total) by mouth daily   senna-docusate sodium (SENOKOT S) 8 6-50 mg per tablet  Spouse/Significant Other, Self No No   Sig: Take 1 tablet by mouth daily at bedtime   Patient not taking: Reported on "6/1/2023   tamsulosin (FLOMAX) 0 4 mg  Spouse/Significant Other, Self No No   Sig: Take 1 capsule (0 4 mg total) by mouth daily with dinner   torsemide (DEMADEX) 20 mg tablet  Self, Spouse/Significant Other No No   Sig: Take 1 tablet (20 mg total) by mouth daily      Facility-Administered Medications: None     Allergies:  No Known Allergies  ------------------------------------------------------------------------------------------------------------  Advance Directive and Living Will:      Power of :    POLST:    ------------------------------------------------------------------------------------------------------------  Anticipated Length of Stay is > 2 midnights    Care Time Delivered:   Upon my evaluation, this patient had a high probability of imminent or life-threatening deterioration due to respiratory failure, which required my direct attention, intervention, and personal management  I have personally provided 45 minutes of critical care time, exclusive of procedures, teaching, family meetings, and any prior time recorded by providers other than myself  Onur Price MD        Portions of the record may have been created with voice recognition software  Occasional wrong word or \"sound a like\" substitutions may have occurred due to the inherent limitations of voice recognition software    Read the chart carefully and recognize, using context, where substitutions have occurred  "

## 2023-06-01 NOTE — ED NOTES
Patient's BP 83/53, MD ([facility]) made aware at this time        Nenita Rodríguez RN  06/01/23 2882

## 2023-06-01 NOTE — ED PROCEDURE NOTE
PROCEDURE  CriticalCare Time    Date/Time: 6/1/2023 3:15 PM    Performed by: Sandi Luis DO  Authorized by:  Sandi Luis DO    Critical care provider statement:     Critical care time (minutes):  55    Critical care time was exclusive of:  Separately billable procedures and treating other patients and teaching time    Critical care was necessary to treat or prevent imminent or life-threatening deterioration of the following conditions:  Respiratory failure    Critical care was time spent personally by me on the following activities:  Blood draw for specimens, obtaining history from patient or surrogate, development of treatment plan with patient or surrogate, discussions with consultants, evaluation of patient's response to treatment, examination of patient, review of old charts, re-evaluation of patient's condition, ordering and review of laboratory studies, ordering and review of radiographic studies and ordering and performing treatments and interventions    I assumed direction of critical care for this patient from another provider in my specialty: no           Sandi Luis DO  06/01/23 1515

## 2023-06-01 NOTE — TELEPHONE ENCOUNTER
Patient of Dr Kye Montiel at [place]    Patient's wife called stating patient is due to see Dr Kye Montiel for cysto  He has been in the hospital a few times and has not been able to schedule it  Please review and see how soon he should be seen   Next cysto with Dr Kye Montiel is not until August      Patient can be reached at [phone]

## 2023-06-01 NOTE — CONSULTS
Consultation - Palliative and Supportive Care   Arleen Downing  79 y o  male [phone]    Patient Active Problem List   Diagnosis   • Nonobstructive atherosclerosis of coronary artery   • Nonischemic cardiomyopathy [facility])   • Chronic obstructive pulmonary disease (HCC)   • Left bundle-branch block   • KEVIN and COPD overlap syndrome ([facility])   • Gastroesophageal reflux disease   • Impaired fasting glucose   • Osteoarthritis   • Osteoporosis   • Vitamin D deficiency   • Mood disorder ([facility])   • Other insomnia   • Macrocytosis without anemia   • Chronic respiratory failure with hypoxia and hypercapnia ([facility])   • Goals of care, counseling/discussion   • BPH with obstruction/lower urinary tract symptoms   • Pro[facility] ([facility]   • Pulmonary nodules/lesions, multiple   • Protein-calorie malnutrition ([facility])   • Chronic HFrEF (heart failure with reduced ejection fraction) ([facility])   • Anemia   • Physical deconditioning   • Hyperglycemia   • COPD exacerbation ([facility])   • HLD (hyperlipidemia)   • Moderate protein-calorie malnutrition ([facility] )     Active issues specifically addressed today include:   COPD exacerbation  Chronic respiratory failure with hypoxia and hypercapnia   Chronic HFrEF   Protein-calorie malnutrition   Nonischemic cardiomyopathy  Goals of care, counseling/discussion      Plan:  1  Symptom management - per MICU    2  Goals - Ongoing medical optimization aimed towards efforts of functional recovery as able, without limits at this time  Introduced option of trial of intubation and consideration of comfort care/hospice if patient shows decline  Goals will continue to evolve and be discussed during hospitalization  Code Status: FULL - Level 1   Decisional apparatus:  Patient is not competent on my exam today  If competence is lost, patient's substitute decision maker would default to spouse and adult child by [place]  Advance Directive / Living Will / POLST:  None on file        I have reviewed the patient's "controlled substance dispensing history in the Prescription Drug Monitoring Program in compliance with the [facility] regulations before prescribing any controlled substances  We appreciate the invitation to be involved in this patient's care  We will continue to follow  Please do not hesitate to reach our on call provider through our clinic answering service at  should you have acute symptom control concerns  Vince Saldivar DO  Palliative and Supportive Care  Clinic/Answering Service: [phone]  You can find me on [employer]! IDENTIFICATION:  Inpatient consult to Palliative Care  Consult performed by: Vince Saldivar DO  Consult ordered by: Payam Lynch PA-C        Physician Requesting Consult: Mamadou Gomez DO  Reason for Consult / Principal Problem: goals of care  Hx and PE limited by: patient on bipap/unable to participate in conversation    HISTORY OF PRESENT ILLNESS:       Tigre Castellanos  is a 79 y o  male who presented to the ED for COPD exacerbation requiring BiPAP  [facility] consulted for goals of care conversation in the setting of respiratory distress, pneumonia, and possibility of requiring intubation imminently  Patient known to [facility] service from prior inpatient hospitalizations and outpatient follow-ups  Patient/family has had ACP meetings with [facility], most recently 5/19/23 as an outpatient, and 2/15/23 while admitted in the MICU and intubated for COPD exacerbation  5/19 patient had stated he is not ready for hospice  Met with patient's spouse, Cris Leo, at bedside in the ED  Cris Leo understandably upset and tearful that patient is back in the hospital  She reports that her and patient were at cardiologist office this morning and code status was discussed, and patient said he wanted to try \"one more intubation\" and remain full code at this appointment  Cris Leo was present for this conversation and confirms   We discussed trial of intubation/critical care " interventions and that [facility] will follow closely if family decides to pursue comfort care/hospice  Mol appreciative of this  She understands that Adama Devlin is very sick and has a poor prognosis overall  Review of Systems   Unable to perform ROS: severe respiratory distress       Past Medical History:   Diagnosis Date   • Acute metabolic encephalopathy 8/06/1075   • Arthritis    • Bladder mass    • Cardiomyopathy [facility])    • Chest pain    • COPD (chronic obstructive pulmonary disease) ([facility])    • COVID-19 virus infection 2/23/2023   • CPAP (continuous positive airway pressure) dependence    • Emphysema of lung (HCC)    • Hypoxia     nocturnal   • Left bundle branch block    • Multiple pulmonary nodules     last assessed: 10/12/16   • Pneumonia    • Sleep apnea    • Sleep apnea, obstructive    • Smoker    • Weight loss 8/29/2019     Past Surgical History:   Procedure Laterality Date   • COLONOSCOPY     • CYSTOSCOPY     • CYSTOSCOPY  02/17/2021   • MT BLADDER INSTILLATION ANTICARCINOGENIC AGENT N/A 1/3/2020    Procedure: INSTILLATION MITOMYCIN;  Surgeon: Paige Farley MD;  [facility];  Service: Urology   • MT CYSTO W/REMOVAL OF LESIONS SMALL N/A 1/3/2020    Procedure: TRANSURETHRAL RESECTION OF BLADDER TUMOR (TURBT);   Surgeon: Paige Farley MD;  [facility];  Service: Urology   • MT CYSTOURETHROSCOPY WITH BIOPSY N/A 4/13/2021    Procedure: Martha Mccollum;  Surgeon: Paige Farley MD;  [facility];  Service: Urology   • MT TRURL ELECTROSURG RESCJ PROSTATE BLEED COMPLETE N/A 4/13/2021    Procedure: TRANSURETHRAL RESECTION OF PROSTATE (TURP) BLADDER BIOPSY, FULGURATION;  Surgeon: Paige Farley MD;  [facility];  Service: Urology     Social History     Socioeconomic History   • Marital status: /Civil Union     Spouse name: Not on file   • Number of children: Not on file   • Years of education: Not on file   • Highest education level: Not on file Occupational History   • Not on file   Tobacco Use   • Smoking status: Former     Packs/day: 2 50     Years: 42 00     Total pack years: 105 00     Types: Cigarettes     Start date: 5     Quit date: 2012     Years since quittin 4   • Smokeless tobacco: Former   • Tobacco comments:     1 ppd for 37 years, 2010 down to 5 cigs a day, is around second hand smoke   Vaping Use   • Vaping Use: Never used   Substance and Sexual Activity   • Alcohol use: Not Currently     Comment: rarely   • Drug use: No   • Sexual activity: Not Currently     Partners: Female   Other Topics Concern   • Not on file   Social History Narrative    Daily coffee consumption: 8 or more cups a day        Used to work in [employer]  On disability  Social Determinants of Health     Financial Resource Strain: Not on file   Food Insecurity: No Food Insecurity (2023)    Hunger Vital Sign    • Worried About Running Out of Food in the Last Year: Never true    • Ran Out of Food in the Last Year: Never true   Transportation Needs: No Transportation Needs (2023)    PRAPARE - Transportation    • Lack of Transportation (Medical): No    • Lack of Transportation (Non-Medical): No   Physical Activity: Not on file   Stress: Not on file   Social Connections: Not on file   Intimate Partner Violence: Not on file   Housing Stability: Unknown (2023)    Housing Stability Vital Sign    • Unable to Pay for Housing in the Last Year: No    • Number of Places Lived in the Last Year: Not on file    • Unstable Housing in the Last Year: No     Family History   Problem Relation Age of Onset   • Diabetes Mother        MEDICATIONS / ALLERGIES:    all current active meds have been reviewed    No Known Allergies    OBJECTIVE:    Physical Exam  Physical Exam  Vitals and nursing note reviewed  Constitutional:       General: He is in acute distress  Appearance: He is cachectic  He is ill-appearing  He is not toxic-appearing or diaphoretic  Comments: On BiPAP   HENT:      Head: Normocephalic and atraumatic  Pulmonary:      Effort: Tachypnea present  Comments: BiPAP  Musculoskeletal:      Cervical back: Neck supple  Right lower leg: No edema  Left lower leg: No edema  Skin:     General: Skin is dry  Neurological:      General: No focal deficit present  Mental Status: He is lethargic  Lab Results: I have personally reviewed pertinent labs  Imaging Studies: n/a  EKG, Pathology, and Other Studies: n/a    Counseling / Coordination of Care    Total floor / unit time spent today 35 minutes  Greater than 50% of total time was spent with the patient and / or family counseling and / or coordination of care  A description of the counseling / coordination of care: chart review, lab review, goals of care counseling/discussion, supportive listening, anticipatory guidance

## 2023-06-01 NOTE — ED PROVIDER NOTES
Emergency Department Note- Amy Bridges  79 y o  male MRN: [id number]    Unit/Bed#: ED 21 Encounter: [id number]        History of Present Illness     Patient is a 45-year-old male with a history of COPD, CHF, tobacco abuse, nonischemic cardiomyopathy, brought in by EMS  Per EMS report as well as review of outpatient cardiology office visit today, the patient's wife noticed that today he woke, seem to be in much more respiratory distress, seeming somewhat confused  He was seen for a regular scheduled office visit, noted to be in significant respiratory distress, full CODE STATUS was confirmed by the provider with the wife, EMS was activated  Blood sugar was 140, noted to be in significant respiratory stress, placed on supplemental oxygen, giving 125 mg Solu-Medrol, albuterol and Atrovent neb  Patient had improvement in his mental status  Right now the patient is a feels short of breath, denies chest pain or palpitations  No sick contacts            REVIEW OF SYSTEMS    Positive for the above    Historical Information   Past Medical History:   Diagnosis Date   • Acute metabolic encephalopathy 3/28/2952   • Arthritis    • Bladder mass    • Cardiomyopathy [facility])    • Chest pain    • COPD (chronic obstructive pulmonary disease) ([facility] [address] )    • COVID-19 virus infection 2/23/2023   • CPAP (continuous positive airway pressure) dependence    • Emphysema of lung (HCC)    • Hypoxia     nocturnal   • Left bundle branch block    • Multiple pulmonary nodules     last assessed: 10/12/16   • Pneumonia    • Sleep apnea    • Sleep apnea, obstructive    • Smoker    • Weight loss 8/29/2019     Past Surgical History:   Procedure Laterality Date   • COLONOSCOPY     • CYSTOSCOPY     • CYSTOSCOPY  02/17/2021   • IL BLADDER INSTILLATION ANTICARCINOGENIC AGENT N/A 1/3/2020    Procedure: INSTILLATION MITOMYCIN;  Surgeon: Ceci Her MD;  Location: [facility];  Service: Urology   • IL CYSTO W/REMOVAL OF LESIONS SMALL N/A 1/3/2020 Procedure: TRANSURETHRAL RESECTION OF BLADDER TUMOR (TURBT); Surgeon: Bernice Ratliff MD;  [facility];  Service: Urology   • GA CYSTOURETHROSCOPY WITH BIOPSY N/A 2021    Procedure: Peder Klinefelter;  Surgeon: Bernice Ratliff MD;  [facility];  Service: Urology   • GA TRURL ELECTROSURG [address] [facility] [address] PROSTATE BLEED COMPLETE N/A 2021    Procedure: TRANSURETHRAL RESECTION OF PROSTATE (TURP) BLADDER BIOPSY, FULGURATION;  Surgeon: Bernice Ratliff MD;  [facility];  Service: Urology     Social History   Social History     Substance and Sexual Activity   Alcohol Use Not Currently    Comment: rarely     Social History     Substance and Sexual Activity   Drug Use No     Social History     Tobacco Use   Smoking Status Former   • Packs/day: 2 50   • Years: 42 00   • Total pack years: 105 00   • Types: Cigarettes   • Start date:    • Quit date:    • Years since quittin 4   Smokeless Tobacco Former   Tobacco Comments    1 ppd for 37 years,  down to 5 cigs a day, is around second hand smoke     Family History:   Family History   Problem Relation Age of Onset   • Diabetes Mother        MEDICATIONS:  No current facility-administered medications on file prior to encounter       Current Outpatient Medications on File Prior to Encounter   Medication Sig Dispense Refill   • acetaminophen (TYLENOL) 325 mg tablet Take 3 tablets (975 mg total) by mouth every 8 (eight) hours as needed for mild pain or moderate pain  0   • albuterol (PROVENTIL HFA,VENTOLIN HFA) 90 mcg/act inhaler Inhale 2 puffs every 6 (six) hours as needed for wheezing 8 5 g 11   • aspirin 81 mg chewable tablet Chew 1 tablet (81 mg total) daily 30 tablet 0   • azithromycin (ZITHROMAX) 250 mg tablet Take 1 tablet (250 mg total) by mouth 3 (three) times a week 12 tablet 0   • budesonide (PULMICORT) 0 5 mg/2 mL nebulizer solution TAKE 2 ML (0 5 MG TOTAL) BY NEBULIZATION TWICE A DAY RINSE MOUTH AFTER  mL 2   • cyanocobalamin (VITAMIN B-12) 1000 MCG tablet Take 1 tablet (1,000 mcg total) by mouth daily 30 tablet 0   • Diclofenac Sodium (VOLTAREN) 1 % APPLY 2 GRAMS 4 TIMES A DAY     • docusate sodium (COLACE) 100 mg capsule Take 100 mg by mouth 2 (two) times a day     • ergocalciferol (VITAMIN D2) 50,000 units TAKE 1 CAPSULE (50,000 UNITS TOTAL) BY MOUTH EVERY 28 DAYS 3 capsule 1   • formoterol (PERFOROMIST) 20 MCG/2ML nebulizer solution TAKE 2 ML (20 MCG TOTAL) BY NEBULIZATION 2 (TWO) TIMES A DAY     • guaiFENesin (MUCINEX) 600 mg 12 hr tablet Take 2 tablets (1,200 mg total) by mouth every 12 (twelve) hours 60 tablet 6   • ipratropium (ATROVENT) 0 02 % nebulizer solution Take 2 5 mL (0 5 mg total) by nebulization 3 (three) times a day 225 mL 5   • ipratropium-albuterol (DUO-NEB) 0 5-2 5 mg/3 mL nebulizer solution USE 1 VIAL VIA NEBULIZER 3 TIMES A  mL 5   • levalbuterol (XOPENEX) 1 25 mg/0 5 mL nebulizer solution Take 0 5 mL (1 25 mg total) by nebulization 3 (three) times a day  0   • Melatonin 5 MG TABS Take 1 tablet by mouth daily at bedtime     • predniSONE 5 mg tablet Take 1 tablet (5 mg total) by mouth daily Do not start before March 5, 2023   0   • rosuvastatin (CRESTOR) 10 MG tablet Take 1 tablet (10 mg total) by mouth daily 90 tablet 3   • senna-docusate sodium (SENOKOT S) 8 6-50 mg per tablet Take 1 tablet by mouth daily at bedtime (Patient not taking: Reported on 6/1/2023)  0   • tamsulosin (FLOMAX) 0 4 mg Take 1 capsule (0 4 mg total) by mouth daily with dinner  0   • torsemide (DEMADEX) 20 mg tablet Take 1 tablet (20 mg total) by mouth daily 90 tablet 3   • [DISCONTINUED] budesonide-formoterol (Symbicort) 160-4 5 mcg/act inhaler Inhale 2 puffs 2 (two) times a day Rinse mouth after use  10 2 g 0   • [DISCONTINUED] mometasone-formoterol (Dulera) 200-5 MCG/ACT inhaler Inhale 2 puffs 2 (two) times a day Rinse mouth after use   13 g 0     ALLERGIES:  No Known Allergies    Vitals:    06/01/23 1300 06/01/23 1330 06/01/23 1342 06/01/23 1445   BP: (!) 89/60 94/60 92/66 109/69   TempSrc:       Pulse: 88 86  86   Resp: 22 17  22   Patient Position - Orthostatic VS:       Temp:           PHYSICAL EXAM    General:  Patient ill-appearing in respiratory distress  Head:  Atraumatic  Eyes:  Conjunctiva pink  ENT:  Mucous membranes are moist  Neck:  Supple  Cardiac:  S1-S2, without murmurs  Lungs: Decreased air movement, diffuse expiratory wheezing, retractions with accessory muscle usage  Abdomen:  Soft, nontender, normal bowel sounds, no CVA tenderness, no tympany, no rigidity, no guarding  Extremities:  Normal range of motion, radial pulses are equal and symmetric bilaterally  He has diffuse bilateral pitting ankle edema and calf edema  No calf asymmetry  Neurologic: Awake but slightly tired, able to answer in 1 or 2 word sentences, oriented to person, place, recent events  Cannot give me a good full history of present illness, being all 4 extremities equally, strength is 5 in the bilateral arms and legs    Skin:  Pink warm and dry, no rash      Labs Reviewed   CBC AND DIFFERENTIAL - Abnormal       Result Value Ref Range Status    WBC 8 08  4 31 - 10 16 Thousand/uL Final    RBC 3 71 (*) 3 88 - 5 62 Million/uL Final    Hemoglobin 11 7 (*) 12 0 - 17 0 g/dL Final    Hematocrit 37 7  36 5 - 49 3 % Final     (*) 82 - 98 fL Final    MCH 31 5  26 8 - 34 3 pg Final    MCHC 31 0 (*) 31 4 - 37 4 g/dL Final    RDW 12 3  11 6 - 15 1 % Final    MPV 9 4  8 9 - 12 7 fL Final    Platelets 739  399 - 390 Thousands/uL Final    nRBC 0  /100 WBCs Final    Neutrophils Relative 86 (*) 43 - 75 % Final    Immat GRANS % 1  0 - 2 % Final    Lymphocytes Relative 5 (*) 14 - 44 % Final    Monocytes Relative 8  4 - 12 % Final    Eosinophils Relative 0  0 - 6 % Final    Basophils Relative 0  0 - 1 % Final    Neutrophils Absolute 7 01  1 85 - 7 62 Thousands/µL Final    Immature Grans Absolute 0 05  0 00 - 0 20 Thousand/uL Final    Lymphocytes Absolute 0 37 (*) 0 60 - 4 47 Thousands/µL Final    Monocytes Absolute 0 63  0 17 - 1 22 Thousand/µL Final    Eosinophils Absolute 0 00  0 00 - 0 61 Thousand/µL Final    Basophils Absolute 0 02  0 00 - 0 10 Thousands/µL Final   BASIC METABOLIC PANEL - Abnormal    Sodium 121 (*) 135 - 147 mmol/L Final    Potassium 4 5  3 5 - 5 3 mmol/L Final    Chloride 84 (*) 96 - 108 mmol/L Final    CO2 40 (*) 21 - 32 mmol/L Final    ANION GAP -3 (*) 4 - 13 mmol/L Final    BUN 19  5 - 25 mg/dL Final    Creatinine 0 67  0 60 - 1 30 mg/dL Final    Comment: Standardized to IDMS reference method    Glucose 143 (*) 65 - 140 mg/dL Final    Comment: Specimen collection should occur prior to Sulfasalazine administration due to the potential for falsely depressed results  Specimen collection should occur prior to Sulfapyridine administration due to the potential for falsely elevated results  If the patient is fasting, the ADA then defines impaired fasting glucose as > 100 mg/dL and diabetes as > or equal to 123 mg/dL      Calcium 8 9  8 3 - 10 1 mg/dL Final    eGFR 100  ml/min/1 73sq m Final    Narrative:     [place] guidelines for Chronic Kidney Disease (CKD):                   •  Stage 1 with normal or high GFR (GFR > 90 mL/min/1 73 square meters)                  •  Stage 2 Mild CKD (GFR = 60-89 mL/min/1 73 square meters)                  •  Stage 3A Moderate CKD (GFR = 45-59 mL/min/1 73 square meters)                  •  Stage 3B Moderate CKD (GFR = 30-44 mL/min/1 73 square meters)                  •  Stage 4 Severe CKD (GFR = 15-29 mL/min/1 73 square meters)                  •  Stage 5 End Stage CKD (GFR <15 mL/min/1 73 square meters)                  Note: GFR calculation is accurate only with a steady state creatinine   B-TYPE NATRIURETIC PEPTIDE (BNP) - Abnormal     (*) 0 - 100 pg/mL Final   BLOOD GAS, VENOUS - Abnormal    pH, Oscar 7 228 (*) 7 300 - 7 400 Final    pCO2, Oscar 96 4 (*) 42 0 - 50 0 mm Hg Final "pO2, Oscar 52 3 (*) 35 0 - 45 0 mm Hg Final    HCO3, Oscar 39 3 (*) 24 - 30 mmol/L Final    Base Excess, Oscar 8 1  mmol/L Final    O2 Content, Oscar 14 4  ml/dL Final    O2 HGB, VENOUS 78 7  60 0 - 80 0 % Final   BLOOD GAS, VENOUS - Abnormal    pH, Oscar 7 250 (*) 7 300 - 7 400 Final    pCO2, Oscar 81 1 (*) 42 0 - 50 0 mm Hg Final    pO2, Oscar 81 0 (*) 35 0 - 45 0 mm Hg Final    HCO3, Oscar 34 8 (*) 24 - 30 mmol/L Final    Base Excess, Oscar 5 1  mmol/L Final    O2 Content, Oscar 15 7  ml/dL Final    O2 HGB, VENOUS 89 6 (*) 60 0 - 80 0 % Final   HS TROPONIN I 0HR - Normal    hs TnI 0hr 28  \"Refer to ACS Flowchart\"- see link ng/L Final    Comment:                                              Initial (time 0) result  If >=50 ng/L, Myocardial injury suggested ;  Type of myocardial injury and treatment strategy  to be determined  If 5-49 ng/L, a delta result at 2 hours and or 4 hours will be needed to further evaluate  If <4 ng/L, and chest pain has been >3 hours since onset, patient may qualify for discharge based on the HEART score in the ED  If <5 ng/L and <3hours since onset of chest pain, a delta result at 2 hours will be needed to further evaluate  HS Troponin 99th Percentile URL of a Health Population=12 ng/L with a 95% Confidence Interval of 8-18 ng/L  Second Troponin (time 2 hours)  If calculated delta >= 20 ng/L,  Myocardial injury suggested ; Type of myocardial injury and treatment strategy to be determined  If 5-49 ng/L and the calculated delta is 5-19 ng/L, consult medical service for evaluation  Continue evaluation for ischemia on ecg and other possible etiology and repeat hs troponin at 4 hours  If delta is <5 ng/L at 2 hours, consider discharge based on risk stratification via the HEART score (if in ED), or MARK risk score in IP/Observation  HS Troponin 99th Percentile URL of a Health Population=12 ng/L with a 95% Confidence Interval of 8-18 ng/L     PROCALCITONIN TEST - Normal    Procalcitonin 0 07  <=0 25 " ng/ml Final    Comment: Suspected Lower Respiratory Tract Infection (LRTI):  - LESS than or EQUAL to 0 25 ng/mL:   low likelihood for bacterial LRTI; antibiotics DISCOURAGED   - GREATER than 0 25 ng/mL:   increased likelihood for bacterial LRTI; antibiotics ENCOURAGED  Suspected Sepsis:  - Strongly consider initiating antibiotics in ALL UNSTABLE patients  - LESS than or EQUAL to 0 5 ng/mL:   low likelihood for bacterial sepsis; antibiotics DISCOURAGED   - GREATER than 0 5 ng/mL:   increased likelihood for bacterial sepsis; antibiotics ENCOURAGED   - GREATER than 2 ng/mL:   high risk for severe sepsis / septic shock; antibiotics strongly ENCOURAGED  Decisions on antibiotic use should not be based solely on Procalcitonin (PCT) levels  If PCT is low but uncertainty exists with stopping antibiotics, repeat PCT in 6-24 hours to confirm the low level  If antibiotics are administered (regardless if initial PCT was high or low), repeat PCT every 1-2 days to consider early antibiotic cessation (when GREATER than 80% decrease from the peak OR when PCT drops below designated cutoffs, whichever comes first), so long as the infection is NOT one that typically requires prolonged treatment durations (e g , bone/joint infections, endocarditis, Staph  aureus bacteremia)      Situations of FALSE-POSITIVE Procalcitonin values:  1) Newborns < 67 hours old  2) Massive stress from severe trauma / burns, major surgery, acute pancreatitis, cardiogenic / hemorrhagic shock, sickle cell crisis, or other organ perfusion abnormalities  3) Malaria and some Candidal infections  4) Treatment with agents that stimulate cytokines (e g , OKT3, anti-lymphocyte globulins, alemtuzumab, IL-2, granulocyte transfusion [NOT GCSFs])  5) Chronic renal disease causes elevated baseline levels (consider GREATER than 0 75 ng/mL as an abnormal cut-off); initiating HD/CRRT may cause transient decreases  6) Paraneoplastic syndromes from medullary thyroid or SCLC, some forms of vasculitis, and acute snnuu-ra-whac disease    Situations of FALSE-NEGATIVE Procalcitonin values:  1) Too early in clinical course for PCT to have reached its peak (may repeat in 6-24 hours to confirm low level)  2) Localized infection WITHOUT systemic (SIRS / sepsis) response (e g , an abscess, osteomyelitis, cystitis)  3) Mycobacteria (e g , Tuberculosis, MAC)  4) Cystic fibrosis exacerbations     LACTIC ACID, PLASMA (W/REFLEX IF RESULT > 2 0) - Normal    LACTIC ACID 1 7  0 5 - 2 0 mmol/L Final    Narrative:     Result may be elevated if tourniquet was used during collection     PROTIME-INR - Normal    Protime 11 7  11 6 - 14 5 seconds Final    INR 0 84  0 84 - 1 19 Final   APTT - Normal    PTT 26  23 - 37 seconds Final    Comment: Therapeutic Heparin Range =  60-90 seconds   BLOOD CULTURE   BLOOD CULTURE   HS TROPONIN I 2HR   HS TROPONIN I 4HR       Medications   albuterol inhalation solution 10 mg (10 mg Nebulization Given by Other 6/1/23 1218)     And   ipratropium (ATROVENT) 0 02 % inhalation solution 1 mg (1 mg Nebulization Given by Other 6/1/23 1218)     And   sodium chloride 0 9 % inhalation solution 3 mL (3 mL Nebulization Given by Other 6/1/23 1218)   albuterol (FOR EMS ONLY) (2 5 mg/3 mL) 0 083 % inhalation solution 5 mg (0 mg Does not apply Given to EMS 6/1/23 1216)   ipratropium-albuterol (FOR EMS ONLY) (DUO-NEB) 0 5-2 5 mg/3 mL inhalation solution 3 mL (0 mL Does not apply Given to EMS 6/1/23 1216)   methylPREDNISolone sodium succinate (FOR EMS ONLY) (Solu-MEDROL) 125 MG injection 125 mg (0 mg Does not apply Given to EMS 6/1/23 1216)   cefTRIAXone (ROCEPHIN) 2,000 mg in dextrose 5 % 50 mL IVPB (2,000 mg Intravenous New Bag 6/1/23 1442)   sodium chloride 0 9 % bolus 250 mL (250 mL Intravenous New Bag 6/1/23 1443)       XR chest 1 view portable   ED Interpretation   Abnormal   Chest x-ray interpreted me shows a new right-sided infiltrate when compared with May 6, 2023          ED Course as of 06/01/23 1513   Thu Jun 01, 2023   1212 Patient immediately evaluated by myself upon arrival, placed on BiPAP given significant work of breathing and respiratory distress   1219 ECG interpreted by me, sinus rhythm, rate of 102, nonspecific intraventricular conduction delay, right axis deviation, some nonspecific T wave abnormalities, no acute change from April 30, 2023   1226 Patient reassessed on BiPAP, respiratory rate of 12, tidal volume between 405 100 mL  Seem to have better respiratory effort, less work of breathing  [id number] 02 Patient reassessed, on BiPAP, respiratory rate of 12, tidal volume 400, less work of breathing  Patient sleepy and somnolent, not opening his eyes spontaneously, will respond to verbal stimuli  Wife at bedside who indicate that the patient seemed to be having much more coughing and more short of breath this morning about 9:30 AM, seemed more fatigued and tired, she was able to convince him to go to the regularly scheduled appointment at 11:00 today  She says that she does not know if he smokes or not but she has not seen him smoking anymore over the last couple weeks  There has been no recent car rides, plane travel, immobilization, or inactivity periods  She said there has been no trauma  Was not a sudden drastic change but got gradually worse with the coughing and increased respiratory distress  She does confirm that he is a full code   1339 Patient's initial presentation was consistent with a COPD exacerbation however on review of the chest x-ray appears the patient may have a right lobe infiltrate  Sepsis alert called for the patient's earlier decreased blood pressure  1339 Patient reassessed, hemodynamically stable, responding to loud verbal stimuli, still staying about the same level of alertness per the wife    Respiratory rate 12, tidal volume between 350 and 400 mL   1345 The 30ml/kg fluid bolus was not given to the patient despite hypotension and/or significantly elevated lactate of = 4 and/or presence of septic shock due to: Heart Failure  The patient will be administered 250 of crystalloid fluid instead since BP improved without treatment  Orders for this have been placed in Epic  The patient may receive additional colloid or crystalloid fluids thereafter based on clinical condition  Corinne Gander, DO     1406 Repeat CO2 improving   1419 Discussed with Dr Stephanie Melara (ICU) - they will be down to see patient   1420 Please note that there was a delay in the recognition of sepsis because the patient initially presented like a COPD exacerbation without symptoms consistent with pneumonia  Pneumonia and the concern for underlying infection was not diagnosed until I interpreted the chest x-ray at 1338 hrs  Assessment/Plan     ED Medical Decision Making:      MEDICAL DECISION MAKING CODING    The differential diagnosis before testing included (but is not limited to) acute COPD exacerbation, acute pneumothorax, and acute severe anemia, which is a medical condition that poses a threat to life/function  Chronic conditions affecting care: As per HPI    COLLECTION AND INTERPRETATION OF DATA  Additional history obtained from: Wife  I reviewed prior external notes, including April 30, 2023 ECG    I ordered each unique test  Tests reviewed personally by me:  ECG: See my ED course  Labs: See above  Imaging: I independently reviewed the chest x-ray as noted above    Discussion with other providers: ICU staff    RISK    Treatment:  Patient admitted    Surgery  -I considered surgery may be necessary prior to completion of the work up but afterwards there is no indication for immediate surgery      Critical care time: 55 minutes    Please see separate procedure note for details    Time reflects when diagnosis was documented in both MDM as applicable and the Disposition within this note     Time User Action Codes Description Comment    6/1/2023  1:46 PM Ray Yogesh Add [J96 00] Acute respiratory failure, unspecified whether with hypoxia or hypercapnia ([facility] [address] )     6/1/2023  1:46 PM Peg Dinh Add [E87 1] Hyponatremia     6/1/2023  2:27 PM Peg Dinh Add [R06 89] Hypercarbia       ED Disposition     ED Disposition   Admit    Condition   Stable    Date/Time   Thu Jun 1, 2023  2:27 PM    Comment   Case was discussed with Dr Maranda Cadet and the patient's admission status was agreed to be Admission Status: inpatient status to the service of Dr Maranda Cadet              Follow-up Information    None         New Prescriptions    No medications on file            Fernando Jose,   06/01/23 0107

## 2023-06-02 ENCOUNTER — HOME HEALTH ADMISSION (OUTPATIENT)
Dept: HOME HEALTH SERVICES | Facility: HOME HEALTHCARE | Age: 68
End: 2023-06-02

## 2023-06-02 LAB
ANION GAP SERPL CALCULATED.3IONS-SCNC: -1 MMOL/L (ref 4–13)
ANION GAP SERPL CALCULATED.3IONS-SCNC: -1 MMOL/L (ref 4–13)
ANION GAP SERPL CALCULATED.3IONS-SCNC: 0 MMOL/L (ref 4–13)
ANION GAP SERPL CALCULATED.3IONS-SCNC: 1 MMOL/L (ref 4–13)
ATRIAL RATE: 98 BPM
BASE EX.OXY STD BLDV CALC-SCNC: 73.5 % (ref 60–80)
BASE EX.OXY STD BLDV CALC-SCNC: 85.9 % (ref 60–80)
BASE EXCESS BLDV CALC-SCNC: 12.8 MMOL/L
BASE EXCESS BLDV CALC-SCNC: 14.5 MMOL/L
BASOPHILS # BLD AUTO: 0 THOUSANDS/ÂΜL (ref 0–0.1)
BASOPHILS NFR BLD AUTO: 0 % (ref 0–1)
BUN SERPL-MCNC: 21 MG/DL (ref 5–25)
BUN SERPL-MCNC: 23 MG/DL (ref 5–25)
BUN SERPL-MCNC: 23 MG/DL (ref 5–25)
BUN SERPL-MCNC: 24 MG/DL (ref 5–25)
CA-I BLD-SCNC: 0.99 MMOL/L (ref 1.12–1.32)
CA-I BLD-SCNC: 1.06 MMOL/L (ref 1.12–1.32)
CALCIUM SERPL-MCNC: 8.4 MG/DL (ref 8.3–10.1)
CALCIUM SERPL-MCNC: 8.5 MG/DL (ref 8.3–10.1)
CALCIUM SERPL-MCNC: 8.7 MG/DL (ref 8.3–10.1)
CALCIUM SERPL-MCNC: 9.1 MG/DL (ref 8.3–10.1)
CHLORIDE SERPL-SCNC: 83 MMOL/L (ref 96–108)
CHLORIDE SERPL-SCNC: 84 MMOL/L (ref 96–108)
CHLORIDE SERPL-SCNC: 84 MMOL/L (ref 96–108)
CHLORIDE SERPL-SCNC: 85 MMOL/L (ref 96–108)
CO2 SERPL-SCNC: 41 MMOL/L (ref 21–32)
CO2 SERPL-SCNC: 42 MMOL/L (ref 21–32)
CREAT SERPL-MCNC: 0.58 MG/DL (ref 0.6–1.3)
CREAT SERPL-MCNC: 0.64 MG/DL (ref 0.6–1.3)
CREAT SERPL-MCNC: 0.65 MG/DL (ref 0.6–1.3)
CREAT SERPL-MCNC: 0.66 MG/DL (ref 0.6–1.3)
EOSINOPHIL # BLD AUTO: 0.01 THOUSAND/ÂΜL (ref 0–0.61)
EOSINOPHIL NFR BLD AUTO: 0 % (ref 0–6)
ERYTHROCYTE [DISTWIDTH] IN BLOOD BY AUTOMATED COUNT: 12.4 % (ref 11.6–15.1)
GFR SERPL CREATININE-BSD FRML MDRD: 100 ML/MIN/1.73SQ M
GFR SERPL CREATININE-BSD FRML MDRD: 101 ML/MIN/1.73SQ M
GFR SERPL CREATININE-BSD FRML MDRD: 102 ML/MIN/1.73SQ M
GFR SERPL CREATININE-BSD FRML MDRD: 106 ML/MIN/1.73SQ M
GLUCOSE SERPL-MCNC: 115 MG/DL (ref 65–140)
GLUCOSE SERPL-MCNC: 127 MG/DL (ref 65–140)
GLUCOSE SERPL-MCNC: 88 MG/DL (ref 65–140)
GLUCOSE SERPL-MCNC: 91 MG/DL (ref 65–140)
HCO3 BLDV-SCNC: 41.5 MMOL/L (ref 24–30)
HCO3 BLDV-SCNC: 43.3 MMOL/L (ref 24–30)
HCT VFR BLD AUTO: 33.5 % (ref 36.5–49.3)
HGB BLD-MCNC: 10.6 G/DL (ref 12–17)
IMM GRANULOCYTES # BLD AUTO: 0.01 THOUSAND/UL (ref 0–0.2)
IMM GRANULOCYTES NFR BLD AUTO: 0 % (ref 0–2)
IPAP: 22
IPAP: 22
LYMPHOCYTES # BLD AUTO: 0.4 THOUSANDS/ÂΜL (ref 0.6–4.47)
LYMPHOCYTES NFR BLD AUTO: 10 % (ref 14–44)
MAGNESIUM SERPL-MCNC: 2 MG/DL (ref 1.6–2.6)
MAGNESIUM SERPL-MCNC: 2 MG/DL (ref 1.6–2.6)
MCH RBC QN AUTO: 31 PG (ref 26.8–34.3)
MCHC RBC AUTO-ENTMCNC: 31.6 G/DL (ref 31.4–37.4)
MCV RBC AUTO: 98 FL (ref 82–98)
MONOCYTES # BLD AUTO: 0.87 THOUSAND/ÂΜL (ref 0.17–1.22)
MONOCYTES NFR BLD AUTO: 22 % (ref 4–12)
NEUTROPHILS # BLD AUTO: 2.67 THOUSANDS/ÂΜL (ref 1.85–7.62)
NEUTS SEG NFR BLD AUTO: 68 % (ref 43–75)
NON VENT- BIPAP: ABNORMAL
NON VENT- BIPAP: ABNORMAL
NRBC BLD AUTO-RTO: 0 /100 WBCS
O2 CT BLDV-SCNC: 12.4 ML/DL
O2 CT BLDV-SCNC: 14.3 ML/DL
OSMOLALITY UR: 375 MMOL/KG
P AXIS: 90 DEGREES
PCO2 BLDV: 77.5 MM HG (ref 42–50)
PCO2 BLDV: 79.1 MM HG (ref 42–50)
PEEP MAX SETTING VENT: 10 CM[H2O]
PEEP MAX SETTING VENT: 10 CM[H2O]
PH BLDV: 7.35 [PH] (ref 7.3–7.4)
PH BLDV: 7.36 [PH] (ref 7.3–7.4)
PHOSPHATE SERPL-MCNC: 3.5 MG/DL (ref 2.3–4.1)
PHOSPHATE SERPL-MCNC: 3.6 MG/DL (ref 2.3–4.1)
PLATELET # BLD AUTO: 155 THOUSANDS/UL (ref 149–390)
PMV BLD AUTO: 9.3 FL (ref 8.9–12.7)
PO2 BLDV: 41.9 MM HG (ref 35–45)
PO2 BLDV: 56.7 MM HG (ref 35–45)
POTASSIUM SERPL-SCNC: 4.2 MMOL/L (ref 3.5–5.3)
POTASSIUM SERPL-SCNC: 4.3 MMOL/L (ref 3.5–5.3)
POTASSIUM SERPL-SCNC: 4.4 MMOL/L (ref 3.5–5.3)
POTASSIUM SERPL-SCNC: 4.4 MMOL/L (ref 3.5–5.3)
PR INTERVAL: 158 MS
PROCALCITONIN SERPL-MCNC: 0.27 NG/ML
QRS AXIS: 86 DEGREES
QRSD INTERVAL: 129 MS
QT INTERVAL: 367 MS
QTC INTERVAL: 469 MS
RBC # BLD AUTO: 3.42 MILLION/UL (ref 3.88–5.62)
SODIUM 24H UR-SCNC: 33 MOL/L
SODIUM SERPL-SCNC: 125 MMOL/L (ref 135–147)
SODIUM SERPL-SCNC: 125 MMOL/L (ref 135–147)
SODIUM SERPL-SCNC: 126 MMOL/L (ref 135–147)
SODIUM SERPL-SCNC: 126 MMOL/L (ref 135–147)
T WAVE AXIS: 260 DEGREES
VENT BIPAP FIO2: 40 %
VENT BIPAP FIO2: 40 %
VENTRICULAR RATE: 98 BPM
WBC # BLD AUTO: 3.96 THOUSAND/UL (ref 4.31–10.16)

## 2023-06-02 PROCEDURE — 94640 AIRWAY INHALATION TREATMENT: CPT

## 2023-06-02 PROCEDURE — 82805 BLOOD GASES W/O2 SATURATION: CPT | Performed by: STUDENT IN AN ORGANIZED HEALTH CARE EDUCATION/TRAINING PROGRAM

## 2023-06-02 PROCEDURE — 92610 EVALUATE SWALLOWING FUNCTION: CPT

## 2023-06-02 PROCEDURE — 84145 PROCALCITONIN (PCT): CPT | Performed by: STUDENT IN AN ORGANIZED HEALTH CARE EDUCATION/TRAINING PROGRAM

## 2023-06-02 PROCEDURE — 94760 N-INVAS EAR/PLS OXIMETRY 1: CPT

## 2023-06-02 PROCEDURE — 94003 VENT MGMT INPAT SUBQ DAY: CPT

## 2023-06-02 PROCEDURE — 85025 COMPLETE CBC W/AUTO DIFF WBC: CPT

## 2023-06-02 PROCEDURE — 82330 ASSAY OF CALCIUM: CPT

## 2023-06-02 PROCEDURE — 84100 ASSAY OF PHOSPHORUS: CPT

## 2023-06-02 PROCEDURE — 80048 BASIC METABOLIC PNL TOTAL CA: CPT

## 2023-06-02 PROCEDURE — 93010 ELECTROCARDIOGRAM REPORT: CPT | Performed by: INTERNAL MEDICINE

## 2023-06-02 PROCEDURE — 93005 ELECTROCARDIOGRAM TRACING: CPT

## 2023-06-02 PROCEDURE — 99291 CRITICAL CARE FIRST HOUR: CPT | Performed by: INTERNAL MEDICINE

## 2023-06-02 PROCEDURE — 94664 DEMO&/EVAL PT USE INHALER: CPT

## 2023-06-02 PROCEDURE — 83735 ASSAY OF MAGNESIUM: CPT

## 2023-06-02 PROCEDURE — 87081 CULTURE SCREEN ONLY: CPT

## 2023-06-02 RX ORDER — CALCIUM GLUCONATE 20 MG/ML
2 INJECTION, SOLUTION INTRAVENOUS ONCE
Status: COMPLETED | OUTPATIENT
Start: 2023-06-02 | End: 2023-06-02

## 2023-06-02 RX ORDER — METHYLPREDNISOLONE SODIUM SUCCINATE 40 MG/ML
40 INJECTION, POWDER, LYOPHILIZED, FOR SOLUTION INTRAMUSCULAR; INTRAVENOUS EVERY 8 HOURS SCHEDULED
Status: DISCONTINUED | OUTPATIENT
Start: 2023-06-02 | End: 2023-06-04

## 2023-06-02 RX ORDER — DOXYCYCLINE HYCLATE 100 MG/1
100 CAPSULE ORAL EVERY 12 HOURS SCHEDULED
Status: COMPLETED | OUTPATIENT
Start: 2023-06-02 | End: 2023-06-11

## 2023-06-02 RX ORDER — FUROSEMIDE 10 MG/ML
40 INJECTION INTRAMUSCULAR; INTRAVENOUS ONCE
Status: COMPLETED | OUTPATIENT
Start: 2023-06-02 | End: 2023-06-02

## 2023-06-02 RX ORDER — LANOLIN ALCOHOL/MO/W.PET/CERES
3 CREAM (GRAM) TOPICAL
Status: DISCONTINUED | OUTPATIENT
Start: 2023-06-02 | End: 2023-06-12 | Stop reason: HOSPADM

## 2023-06-02 RX ADMIN — LEVALBUTEROL HYDROCHLORIDE 1.25 MG: 1.25 SOLUTION RESPIRATORY (INHALATION) at 13:06

## 2023-06-02 RX ADMIN — TAMSULOSIN HYDROCHLORIDE 0.4 MG: 0.4 CAPSULE ORAL at 16:14

## 2023-06-02 RX ADMIN — VANCOMYCIN HYDROCHLORIDE 1250 MG: 10 INJECTION, POWDER, LYOPHILIZED, FOR SOLUTION INTRAVENOUS at 11:10

## 2023-06-02 RX ADMIN — FUROSEMIDE 40 MG: 10 INJECTION, SOLUTION INTRAMUSCULAR; INTRAVENOUS at 11:15

## 2023-06-02 RX ADMIN — DOXYCYCLINE 100 MG: 100 CAPSULE ORAL at 13:35

## 2023-06-02 RX ADMIN — CALCIUM GLUCONATE 2 G: 20 INJECTION, SOLUTION INTRAVENOUS at 15:15

## 2023-06-02 RX ADMIN — MELATONIN 3 MG: at 21:16

## 2023-06-02 RX ADMIN — IPRATROPIUM BROMIDE 0.5 MG: 0.5 SOLUTION RESPIRATORY (INHALATION) at 13:05

## 2023-06-02 RX ADMIN — LEVALBUTEROL HYDROCHLORIDE 1.25 MG: 1.25 SOLUTION RESPIRATORY (INHALATION) at 07:40

## 2023-06-02 RX ADMIN — ENOXAPARIN SODIUM 40 MG: 40 INJECTION SUBCUTANEOUS at 08:41

## 2023-06-02 RX ADMIN — METHYLPREDNISOLONE SODIUM SUCCINATE 40 MG: 40 INJECTION, POWDER, FOR SOLUTION INTRAMUSCULAR; INTRAVENOUS at 14:03

## 2023-06-02 RX ADMIN — VANCOMYCIN HYDROCHLORIDE 750 MG: 750 INJECTION, SOLUTION INTRAVENOUS at 18:26

## 2023-06-02 RX ADMIN — FORMOTEROL FUMARATE DIHYDRATE 20 MCG: 20 SOLUTION RESPIRATORY (INHALATION) at 07:40

## 2023-06-02 RX ADMIN — CHLORHEXIDINE GLUCONATE 15 ML: 1.2 SOLUTION ORAL at 21:14

## 2023-06-02 RX ADMIN — FORMOTEROL FUMARATE DIHYDRATE 20 MCG: 20 SOLUTION RESPIRATORY (INHALATION) at 19:11

## 2023-06-02 RX ADMIN — CEFEPIME 2000 MG: 2 INJECTION, POWDER, FOR SOLUTION INTRAVENOUS at 21:16

## 2023-06-02 RX ADMIN — IPRATROPIUM BROMIDE 0.5 MG: 0.5 SOLUTION RESPIRATORY (INHALATION) at 19:11

## 2023-06-02 RX ADMIN — IPRATROPIUM BROMIDE 0.5 MG: 0.5 SOLUTION RESPIRATORY (INHALATION) at 07:40

## 2023-06-02 RX ADMIN — CEFEPIME 2000 MG: 2 INJECTION, POWDER, FOR SOLUTION INTRAVENOUS at 13:44

## 2023-06-02 RX ADMIN — METHYLPREDNISOLONE SODIUM SUCCINATE 40 MG: 40 INJECTION, POWDER, FOR SOLUTION INTRAMUSCULAR; INTRAVENOUS at 08:41

## 2023-06-02 RX ADMIN — DOCUSATE SODIUM 100 MG: 100 CAPSULE, LIQUID FILLED ORAL at 18:26

## 2023-06-02 RX ADMIN — CALCIUM GLUCONATE 2 G: 20 INJECTION, SOLUTION INTRAVENOUS at 08:42

## 2023-06-02 RX ADMIN — METHYLPREDNISOLONE SODIUM SUCCINATE 40 MG: 40 INJECTION, POWDER, FOR SOLUTION INTRAMUSCULAR; INTRAVENOUS at 21:14

## 2023-06-02 RX ADMIN — ATORVASTATIN CALCIUM 40 MG: 40 TABLET, FILM COATED ORAL at 16:14

## 2023-06-02 RX ADMIN — GUAIFENESIN 1200 MG: 600 TABLET, EXTENDED RELEASE ORAL at 21:14

## 2023-06-02 RX ADMIN — LEVALBUTEROL HYDROCHLORIDE 1.25 MG: 1.25 SOLUTION RESPIRATORY (INHALATION) at 19:11

## 2023-06-02 RX ADMIN — DOXYCYCLINE 100 MG: 100 CAPSULE ORAL at 21:14

## 2023-06-02 RX ADMIN — BUDESONIDE 0.5 MG: 0.5 INHALANT ORAL at 07:40

## 2023-06-02 NOTE — PLAN OF CARE
Problem: MOBILITY - ADULT  Goal: Maintains/Returns to pre admission functional level  Description: INTERVENTIONS:  - Perform BMAT or MOVE assessment daily    - Set and communicate daily mobility goal to care team and patient/family/caregiver     - Collaborate with rehabilitation services on mobility goals if consulted  - Record patient progress and toleration of activity level   Outcome: Progressing    Problem: MOBILITY - ADULT  Goal: Maintain or return to baseline ADL function  Description: INTERVENTIONS:  -  Assess patient's ability to carry out ADLs; assess patient's baseline for ADL function and identify physical deficits which impact ability to perform ADLs (bathing, care of mouth/teeth, toileting, grooming, dressing, etc )  - Assess/evaluate cause of self-care deficits   - Assess range of motion  - Assess patient's mobility; develop plan if impaired  - Assess patient's need for assistive devices and provide as appropriate  - Encourage maximum independence but intervene and supervise when necessary  - Involve family in performance of ADLs  - Assess for home care needs following discharge   - Consider OT consult to assist with ADL evaluation and planning for discharge  - Provide patient education as appropriate  Outcome: Progressing

## 2023-06-02 NOTE — PROGRESS NOTES
Pastoral Care Progress Note    2023  Patient: Ashlyn Herrera  : 1955  Admission Date & Time: 2023 1207  MRN: [id number] CSN: [id number]          Made /spiritual care intro to Pt and asked if there was anything I could do for him  He said, thanks for the intro, but I have no need for you   offered a cheerful word and departed

## 2023-06-02 NOTE — PROGRESS NOTES
Tammy Valenzuela  is a 79 y o  male who is currently ordered Vancomycin IV with management by the Pharmacy Consult service  Relevant clinical data and objective / subjective history reviewed  Vancomycin Assessment:  Indication and Goal AUC/Trough: Pneumonia (goal -600, trough >10), -600, trough >10  Clinical Status: stable but remains critically ill  Micro:   MRSA nares pending    Renal Function:  SCr: 0 58 mg/dL  CrCl: 85 9 mL/min  Renal replacement: Not on dialysis  Days of Therapy: 1  Current Dose: 1250 mg IV x1  Vancomycin Plan:  New Dosin mg IV Q12H  Estimated AUC: 432 mcg*hr/mL  Estimated Trough: 12 6 mcg/mL  Next Level: 6/3 with AM labs  Renal Function Monitoring: Daily BMP and [place] will continue to follow closely for s/sx of nephrotoxicity, infusion reactions and appropriateness of therapy  BMP and CBC will be ordered per protocol  We will continue to follow the patient’s culture results and clinical progress daily      Kinza Le, Pharmacist

## 2023-06-02 NOTE — PROGRESS NOTES
[facility]  Progress Note: Critical Care  Name: Jud Carr  79 y o  male I MRN: [id number]  Unit/Bed#: MICU 6 I Date of Admission: 6/1/2023   Date of Service: 6/2/2023 I Hospital Day: 1    Assessment/Plan   Neuro:   • Diagnosis: Acute metabolic metabolic encephalopathy  ? 2/2 hypercapnia  ? More likely due to CHF, COPD, less likely pneumonia  ? SpO2 100% on BiPAP 22/10 FiO2 40% RR 14 w/ 70-80% leakage  ? Mentation: open eyes, follows commands, answers questions verbally, eagered to get BiPAP off  ? Plan:  ? Trial NC  - CAM-ICU  - Delirium precautions  - Treatment of underlying metabolic derangement  - Continue to monitor mentation on NC  • Diagnosis: Analgesia  ? Plan:  · Tylenol PRN      CV:   • Diagnosis: Acute on chronic HFrEF  ? Per outpatient HF note on morning of admission, current diuretic is torsemide 20 mg daily  ? Last echo 5/1/23: EF 20%, severe global hypokinesis, RVSP 30 00  ?   ? I/Os  ? Intake: 300  ? Output: 615  ? Net: -615  ? Goal net >=-1 L  ? Plan:   - IV Lasix 40 mg x1 given  - Consider another dose IV Lasix 40 mg given low output  - Continue torsemide 20 mg qd tomorrow unless requiring additional IV doses  • Diagnosis: Hypotension  - Unclear etiology  - Plan:  - Continue to monitor  - Avoid fluids as patient is hypervolemic  • Diagnosis: Nonobstructive CAD  ? Trop 28 --> 27  ? Plan:  - Cont ASA, statin  • Diagnosis: HLD  ? Plan:  · Atorvastatin 40 mg qd      Pulm:  • Diagnosis: Acute on chronic hypercapnic respiratory failure, chronically on 2L and CPAP at night  ? Suspect heart failure exacerbation most likely, also possibly COPD exacerbation and less likely pneumonia  ? 6/2 6am VBG:   ? pH: 7 356  ? CO2: 79 1  ? HCO3: 43 3  ? BIPAP overnight, settings 22/10 FiO2 40% RR 14 w/ 70-80% leakage  ?  Plan:  - IV Lasix 40 mg x1 given  - Given 1 dose IV ceftriaxone in ED   - Consider starting home azithromycin 50 mg 3 days   - WBC 3 96  - No fever  - Procal increased to 0 27, up from 0 07 6/1  - Continue Xopenex (levalbuterol), atrovent (ipratropium), Perforomist (formoterol), Pulmicort (budesonide), mucinex  - Start home prednisone 5 mg qd for COPD exacerbation  - Was given 125 mg solu-medrol en route  - BiPAP - trend blood gas  - Trial NC given improved blood gas    • Diagnosis: COPD  ? Plan:  - Plan as above    · Diagnosis: KEVIN on CPAP  · Plan:  · BiPAP at night      GI:   • Diagnosis: GERD  ? Plan:  - Not on PPI/H2RA at home  • Diagnosis: Bowel regimen  ? Plan:  · Colace      :   • Diagnosis: Prostate CA s/p TURP  ? Plan:  - Continue Flomax  • Diagnosis: Hyponatremia  ? Na 121 on admission, acute  ? Suspect due to CHF as patient is hypervolemic  ? Labs 6/1:  ? Hypotonic hyponatremia  ? Serum Na 126, up from 121 on 6/1 12pm  ? No greater increase of 8 in 24 hours, current rate of Na increase is okay  ? Serum osmolality: 272  ? Urine osmolality: 375 (taken after lasix)  ? Urine Na: 33 (taken after lasix)  ? Plan:  - Diurese as above  · Continue to trend Na      F/E/N:   · F- none  · E: K>4, Mg>2, Phos>3  · Ionized Ca low (1 06), gave calcium  · N: NPO for continuous BiPAP, nutrition consult if off BiPAP      Heme/Onc:   • Diagnosis: Normocytic/macrocytic anemia  ? Baseline 8-9s  ? 6/2: 10 6  MCV 98  ? Plan:  - Continue to trend  - Start B12/folate when off BiPAP  · Transfuse Hgb <7    · Diagnosis: Leukopenia  · 6/2 WBC 3 96, down from 8 08 6/1  · Plan  · Continue to monitor       Endo:   • Diagnosis: Osteoporosis  ? Plan:  · Was on bisphosphonate in the past, does not appear to be on them now      ID:   • Diagnosis: Low suspicion for pneumonia  ? 6/1: T 99 6, procal 0 07, WBC 8 08, dry cough, no increase in sputum production  ? 6/2: T 97 4, procal 0 27 (up from 0 07 6/1), WBC 3 96  ? Plan:  - Consider starting abx  - Consider abx if worsen procal, CXR, vital signs      MSK/Skin:   • Diagnosis: Ambulatory dysfunction  ?  Chronically uses a walker and sometimes a wheelchair  ? Wife reports he needed wheelchair this morning for increased difficulty ambulating  ? Plan:  - Frequent repositioning  · PT/OT when appropriate      Disposition: Critical care    ICU Core Measures     A: Assess, Prevent, and Manage Pain · Has pain been assessed? Yes  · Need for changes to pain regimen? No   B: Both SAT/SAT  · N/A   C: Choice of Sedation · RASS Goal: 0 Alert and Calm  · Need for changes to sedation or analgesia regimen? No   D: Delirium · CAM-ICU: Negative   E: Early Mobility  · Plan for early mobility? No   F: Family Engagement · Plan for family engagement today? Yes         Prophylaxis:  VTE VTE covered by:  enoxaparin, Subcutaneous       Stress Ulcer  not ordered        Subjective   HPI/24hr events:   HX and PE limited by: Bipap, somnolence  Patricia Sanabria  is a 79 y o  male with PMH of COPD (FEV1 22%) chronically on 2L, KEVIN on CPAP at night, chronic HFrEF (EF 20%), osteoporosis, prostate CA s/p TURP, GERD, anemia, HLD who presents from his Heart Failure office for increasing respiratory distress and confusion since this morning  Patient given 125 mg Solu-medrol, albuterol, and atrovent neb en route  He was immediately placed on BiPAP on arrival  BP fell from 132/77 to 86/53 after 30 minutes  , CXR wet read appears as bilateral congestion   VBG pH 7 228, pCO2 96 4, pO2 52 3, HCO 39 3 --> pH 7 250, pCO2 81 1, pO2 81 0, HCO3 34 8 after ~90 minutes on BiPAP  Patient given 250 cc bolus NS and 1 dose of Rocephin in ED      Patient seen by Palliative Care in the ED and wife confirms patient is Level 1 full code, as was confirmed at his Heart Failure visit this AM  He was admitted and intubated in February for COPD exacerbation  He was extubated after 3 days      Patient and wife seen at the bedside by Critical Care team  Per wife, patient had some dry cough yesterday but was otherwise well  This AM she noted him to have worsening SOB and having difficulty ambulating   He reported feeling hot and she noted him to have some nonspecific shaking, as if he were having chills  He did not complain of any symptoms to her  She feels his legs have become increasingly swollen      History obtained from chart review and wife  Review of Systems   Constitutional: Negative for chills and fever  HENT: Positive for sore throat  Respiratory: Negative for shortness of breath  Cardiovascular: Negative for chest pain  Gastrointestinal: Negative for abdominal distention and abdominal pain  Genitourinary: Negative for dysuria  Musculoskeletal: Negative for arthralgias  Neurological: Negative for dizziness and headaches  Objective                            Vitals I/O      Most Recent Min/Max in 24hrs   Temp (!) 97 °F (36 1 °C) Temp  Min: 97 °F (36 1 °C)  Max: 99 6 °F (37 6 °C)   Pulse 94 Pulse  Min: 82  Max: 111   Resp 21 Resp  Min: 13  Max: 34   /66 BP  Min: 83/53  Max: 132/77   O2 Sat 100 % SpO2  Min: 98 %  Max: 100 %      Intake/Output Summary (Last 24 hours) at 6/2/2023 0696  Last data filed at 6/2/2023 0200  Gross per 24 hour   Intake 300 ml   Output 450 ml   Net -150 ml         Diet NPO     Invasive Monitoring Physical exam   Lines/Drains/Airways     Active Status     Name Placement date Placement time Site Days    External Urinary Catheter Small 06/01/23  2200  -- less than 1             Physical Exam  HENT:      Head: Normocephalic  Eyes:      Conjunctiva/sclera: Conjunctivae normal    Cardiovascular:      Rate and Rhythm: Normal rate and regular rhythm  Comments: Difficult to hear heart sounds    Pulmonary:      Breath sounds: Wheezing (Expiratory wheezing) present  Comments: On NC trial- difficulty with breathing  Abdominal:      General: Abdomen is flat  Bowel sounds are normal  There is no distension  Palpations: Abdomen is soft  Tenderness: There is no abdominal tenderness  Musculoskeletal:      Cervical back: Neck supple        Right lower leg: Edema present  Left lower leg: Edema present  Comments: Cachectic   Skin:     General: Skin is warm and dry  Capillary Refill: Capillary refill takes less than 2 seconds  Neurological:      General: No focal deficit present  Mental Status: He is alert  Diagnostic Studies    EKG:   Imaging:  I have personally reviewed pertinent reports         Medications:  Scheduled PRN   aspirin, 81 mg, Daily  atorvastatin, 40 mg, Daily With Dinner  budesonide, 0 5 mg, Daily  chlorhexidine, 15 mL, Q12H GABE  docusate sodium, 100 mg, BID  enoxaparin, 40 mg, Daily  formoterol, 20 mcg, Q12H  guaiFENesin, 1,200 mg, Q12H GABE  ipratropium, 0 5 mg, TID  levalbuterol, 1 25 mg, TID  melatonin, 4 5 mg, HS  methylPREDNISolone sodium succinate, 40 mg, Once  tamsulosin, 0 4 mg, Daily With Dinner      acetaminophen, 975 mg, Q8H PRN       Continuous          Labs:    CBC    Recent Labs     06/01/23  1220 06/01/23  1704   HCT 37 7  --    HGB 11 7*  --     173   WBC 8 08  --      BMP    Recent Labs     06/01/23 2016 06/02/23  0001   AGAP 2* -1*   BUN 19 21   CALCIUM 8 4 8 4   CL 85* 84*   CO2 36* 42*   CREATININE 0 70 0 64   K 4 5 4 4   SODIUM 123* 125*       Coags    Recent Labs     06/01/23  1245   INR 0 84   PTT 26        Additional Electrolytes  No recent results       Blood Gas    No recent results  Recent Labs     06/02/23  0001   BEVEN 12 8   VUR7RHF 41 5*   NOE4HGA 77 5*   PHVEN 7 347   PO2VEN 41 9    LFTs  No recent results    Infectious  Recent Labs     06/01/23  1220   PROCALCITONI 0 07     Glucose  Recent Labs     06/01/23  1220 06/01/23  1827 06/01/23 2016 06/02/23  0001   GLUC 143*  [place] [address]   MS4

## 2023-06-02 NOTE — UTILIZATION REVIEW
Initial Clinical Review    Admission: Date/Time/Statement:   Admission Orders (From admission, onward)     Ordered        06/01/23 1539  Inpatient Admission  Once                      Orders Placed This Encounter   Procedures   • Inpatient Admission     Standing Status:   Standing     Number of Occurrences:   1     Order Specific Question:   Level of Care     Answer:   Critical Care [15]     Order Specific Question:   Estimated length of stay     Answer:   More than 2 Midnights     Order Specific Question:   Certification     Answer:   I certify that inpatient services are medically necessary for this patient for a duration of greater than two midnights  See H&P and MD Progress Notes for additional information about the patient's course of treatment  ED Arrival Information     Expected   6/1/2023 11:43    Arrival   6/1/2023 12:07    Acuity   Emergent            Means of arrival   Ambulance    Escorted by   [address] [facility]    Admission type   Emergency            Arrival complaint   copd           Chief Complaint   Patient presents with   • Altered Mental Status     With sob as of 0930  Evaluated by vascular and sent to ED  Hx CHF COPD MI       Initial Presentation: 79 y o  male with PMH of COPD on 2L NC, KEVIN on CPAP @ HS, chronic HFrEF 20%, osteoporosis, prostate CA s/p TURP, GERD, anemia, HLD presented to the ED from OP HF office via EMS w/ increasing resp distress and confusion this am  He was given 125 mg Solu-medrol, albuterol, and atrovent neb en route  Pt was noted by his wife to have worsening SOB and difficulty ambulating this am  Pt reports feeling hot and have some nonspecific shaking, as if having chills, his legs increasing swollen  In the ED, placed on BIPAP  BP fell from 132/77 to 86/53 after 30 minutes  , CXR wet read appears as bilateral congestion    VBG pH 7 228, pCO2 96 4, pO2 52 3, HCO 39 3 --> pH 7 250, pCO2 81 1, pO2 81 0, HCO3 34 8 after ~90 minutes on BiPAP  Given 250 cc bolus NS and 1 dose of Rocephin in ED  On exam, somnolent, w/drawing to pain, distant heart sound, wheezing, abdominal tenderness in the epigastric area, tense upper abdomen, +2 b/l pitting edema  Admitted as Inpatient for acute on chronic HFrEF, acute on chronic hypercapnic resp failure, possible acute exacerbation of COPD, hypotension, acute metabolic encephalopathy  Plan: IV Lasix 40 mg x 1  Continue torsemide 20 mg qd tomorrow unless requiring additional IV doses  Mon off abx for now  Xopenex, atrovent, pulmicort, budesonide, mucinex  Continue home azithromycin 250 mg MWF  Continue home prednisone 5 mg qd  Cont BIPAP, trend blood gas  Tylenol prn  Delirium precautions  Repeat procal in AM     06/01 Palliative Care: Goals - Ongoing medical optimization aimed towards efforts of functional recovery as able, without limits at this time  Date: 06/02  Day 2:   Critical Care Notes: Pt's mentation improving, opens eyes, follows command, answers questions verbally  Expiratory wheezing and b/l LE edema noted on exam  WBC 3 96, procal up to 0 27  Blood gas improved  Na 121 on admission, up to 126 today  Trial NC  Cont to mon mentation on NC  NPO  Low OUP noted, consider another dose of IV Lasix  No greater increase of 8 in 24 hours, current rate of Na increase is okay  Cont to trend Na  home azithromycin 50 mg 3 days  Continue Xopenex (levalbuterol), atrovent (ipratropium), Perforomist (formoterol), Pulmicort (budesonide), mucinex  Start home prednisone 5 mg qd  Consider starting abx       ED Triage Vitals   Temperature Pulse Respirations Blood Pressure SpO2   06/01/23 1239 06/01/23 1212 06/01/23 1212 06/01/23 1212 06/01/23 1212   99 6 °F (37 6 °C) (!) 111 (!) 34 132/77 100 %      Temp Source Heart Rate Source Patient Position - Orthostatic VS BP Location FiO2 (%)   06/01/23 1239 06/01/23 1245 06/01/23 1212 06/01/23 1212 06/01/23 1613   Rectal Monitor Lying Right arm 40      Pain Score 06/01/23 1620       No Pain          Wt Readings from Last 1 Encounters:   06/02/23 48 5 kg (106 lb 14 8 oz)     Additional Vital Signs:   Date/Time Temp Pulse Resp BP MAP (mmHg) SpO2 FiO2 (%) Calculated FIO2 (%) - Nasal Cannula Nasal Cannula O2 Flow Rate (L/min) O2 Device O2 Interface Device Patient Position - Orthostatic VS   06/02/23 1100 -- 102 20 118/73 88 93 % -- -- -- -- -- --   06/02/23 1000 -- 96 22 119/67 83 91 % -- -- -- -- -- --   06/02/23 0936 -- 100 33 Abnormal  111/68 83 92 % -- -- -- -- HFNC prongs --   06/02/23 0900 -- 90 26 Abnormal  104/67 75 97 % -- -- -- -- -- --   06/02/23 0822 -- -- -- -- -- 100 % -- -- -- -- Face mask --   06/02/23 0744 -- -- -- -- -- 95 % -- 28 2 L/min Nasal cannula -- --   06/02/23 0700 -- 90 21 113/73 87 100 % -- -- -- -- -- --   06/02/23 0600 -- 94 24 Abnormal  118/76 90 100 % -- -- -- -- -- --   06/02/23 0500 -- 92 19 109/67 84 99 % -- -- -- -- -- --   06/02/23 0400 97 4 °F (36 3 °C) Abnormal  94 21 112/66 80 100 % -- -- -- BiPAP -- Lying   06/02/23 0300 -- 98 20 115/75 88 99 % -- -- -- -- -- --   06/02/23 0252 -- -- -- -- -- -- -- -- -- -- Face mask --   06/02/23 0200 -- 90 25 Abnormal  107/70 80 100 % -- -- -- -- -- --   06/02/23 0100 -- 86 26 Abnormal  110/72 89 100 % -- -- -- -- -- --   06/02/23 0000 -- 92 25 Abnormal  107/70 82 100 % -- -- -- BiPAP -- Lying   06/01/23 2300 -- 90 26 Abnormal  104/70 87 99 % -- -- -- -- -- --   06/01/23 2253 -- -- -- -- -- -- -- -- -- -- Face mask --   06/01/23 2200 97 °F (36 1 °C) Abnormal  88 16 106/69 80 100 % -- -- -- BiPAP -- Lying   06/01/23 2100 -- 88 13 105/69 82 100 % -- -- -- -- -- --   06/01/23 2000 -- 88 19 93/64 76 99 % -- -- -- -- -- --   06/01/23 1913 -- -- -- -- -- 98 % -- -- -- -- Face mask --   06/01/23 1620 97 4 °F (36 3 °C) Abnormal  86 31 Abnormal  113/70 83 98 % -- -- -- BiPAP -- Lying   06/01/23 1613 -- -- -- -- -- 99 % 40 -- -- BiPAP Face mask --   06/01/23 1530 -- 82 23 Abnormal  92/61 72 99 % -- -- -- BiPAP -- --   06/01/23 1515 -- 82 18 98/60 74 100 % -- -- -- BiPAP -- --   06/01/23 1500 -- 82 22 111/71 87 100 % -- -- -- None (Room air) -- --   06/01/23 1445 -- 86 22 109/69 85 99 % -- -- -- BiPAP -- --   06/01/23 1342 -- -- -- 92/66 -- -- -- -- -- -- -- --   06/01/23 1330 -- 86 17 94/60 72 100 % -- -- -- BiPAP -- --   06/01/23 1300 -- 88 22 89/60 Abnormal  70 99 % -- -- -- None (Room air) -- --   06/01/23 1257 -- -- -- -- -- -- -- -- -- BiPAP -- --   06/01/23 1253 -- 89 -- 83/53 Abnormal  -- -- -- -- -- -- -- --   06/01/23 1245 -- 94 26 Abnormal  86/53 Abnormal  65 100 % -- -- -- BiPAP -- --   06/01/23 1239 99 6 °F (37 6 °C) -- -- -- -- -- -- -- -- -- -- --   06/01/23 1232 -- -- -- -- -- 100 % -- -- -- -- Face mask --       Pertinent Labs/Diagnostic Test Results:   XR chest 1 view portable   ED Interpretation by Jenna López DO (06/01 1338)   Abnormal   Chest x-ray interpreted me shows a new right-sided infiltrate when compared with May 6, 2023      Final Result by Amber Borges MD (06/02 1159)      1  Mild right basilar airspace opacity concerning for pneumonia  2   Background emphysematous changes  Interpretation concordant with preliminary findings from the emergency department                    Workstation performed: [id number]           06/01 EKG result:Sinus tachycardia  Right atrial enlargement  Rightward axis  Poor anterior R wave progression is noted  ST & T wave abnormality, consider inferolateral ischemia    Date and Time Eye Opening Best Verbal Response Best Motor Response Tessy Coma Scale Score   06/02/23 0500 4 5 6 15   06/02/23 0000 3 5 6 14   06/01/23 2100 3 5 6 14   06/01/23 1620 3 5 6 14           Results from last 7 days   Lab Units 06/02/23  0606 06/01/23  1704 06/01/23  1220   HEMATOCRIT % 33 5*  --  37 7   HEMOGLOBIN g/dL 10 6*  --  11 7*   NEUTROS ABS Thousands/µL 2 67  --  7 01   PLATELETS Thousands/uL 155 173 178   WBC Thousand/uL 3 96*  --  8 08 Results from last 7 days   Lab Units 06/02/23  0853 06/02/23  0606 06/02/23  0001 06/01/23 2016 06/01/23  1827   ANION GAP mmol/L 1* 0* -1* 2* -3*   BUN mg/dL 24 23 21 19 20   CALCIUM, IONIZED mmol/L  --  1 06*  --   --   --    CALCIUM mg/dL 8 7 8 5 8 4 8 4 8 8   CHLORIDE mmol/L 83* 84* 84* 85* 85*   CO2 mmol/L 42* 42* 42* 36* 41*   CREATININE mg/dL 0 58* 0 66 0 64 0 70 0 74   EGFR ml/min/1 73sq m 106 100 102 98 96   POTASSIUM mmol/L 4 2 4 3 4 4 4 5 4 7   MAGNESIUM mg/dL  --  2 0  --   --   --    PHOSPHORUS mg/dL  --  3 5  --   --   --    SODIUM mmol/L 126* 126* 125* 123* 123*             Results from last 7 days   Lab Units 06/02/23  0853 06/02/23  0606 06/02/23  0001 06/01/23 2016 06/01/23  1827 06/01/23  1220   GLUCOSE RANDOM mg/dL 91 88 115 134 134 143*     Results from last 7 days   Lab Units 06/01/23  1704   OSMOLALITY, SERUM mmol/*         Results from last 7 days   Lab Units 06/02/23 0606 06/02/23 0001 06/01/23  1704   BASE EXC JHONATHAN mmol/L 14 5 12 8 8 9   HCO3 JHONATHAN mmol/L 43 3* 41 5* 39 0*   O2 CONTENT JHONATHAN ml/dL 14 3 12 4 14 8   O2 HGB, VENOUS % 85 9* 73 5 83 4*   PCO2 JHONATHAN mm Hg 79 1* 77 5* 87 0*   PH JHONATHAN  7 356 7 347 7 269*   PO2 JHONATHAN mm Hg 56 7* 41 9 55 0*             Results from last 7 days   Lab Units 06/01/23  1442 06/01/23  1220   HS TNI 0HR ng/L  --  28   HS TNI 2HR ng/L 27  --    HSTNI D2 ng/L -1  --          Results from last 7 days   Lab Units 06/01/23  1245   INR  0 84   PROTIME seconds 11 7   PTT seconds 26         Results from last 7 days   Lab Units 06/02/23  0606 06/01/23  1220   PROCALCITONIN ng/ml 0 27* 0 07     Results from last 7 days   Lab Units 06/01/23  1245   LACTIC ACID mmol/L 1 7             Results from last 7 days   Lab Units 06/01/23  1220   BNP pg/mL 814*           Results from last 7 days   Lab Units 06/01/23  2259 06/01/23  1704   OSMOLALITY, SERUM mmol/KG  --  272*   OSMO UR mmol/  --      Results from last 7 days   Lab Units 06/01/23  2259   SODIUM UR  33 Results from last 7 days   Lab Units 06/01/23  1245   BLOOD CULTURE  Received in Microbiology Lab  Culture in Progress  Received in Microbiology Lab  Culture in Progress                     ED Treatment:   Medication Administration from 06/01/2023 1143 to 06/01/2023 1614       Date/Time Order Dose Route Action     06/01/2023 1218 EDT albuterol inhalation solution 10 mg 10 mg Nebulization Given by Other     06/01/2023 1218 EDT ipratropium (ATROVENT) 0 02 % inhalation solution 1 mg 1 mg Nebulization Given by Other     06/01/2023 1218 EDT sodium chloride 0 9 % inhalation solution 3 mL 3 mL Nebulization Given by Other     06/01/2023 1216 EDT albuterol (FOR EMS ONLY) (2 5 mg/3 mL) 0 083 % inhalation solution 5 mg 0 mg Does not apply Given to EMS     06/01/2023 1216 EDT ipratropium-albuterol (FOR EMS ONLY) (DUO-NEB) 0 5-2 5 mg/3 mL inhalation solution 3 mL 0 mL Does not apply Given to EMS     06/01/2023 1216 EDT methylPREDNISolone sodium succinate (FOR EMS ONLY) (Solu-MEDROL) 125 MG injection 125 mg 0 mg Does not apply Given to EMS     06/01/2023 1550 EDT cefTRIAXone (ROCEPHIN) 2,000 mg in dextrose 5 % 50 mL IVPB 0 mg Intravenous Stopped     06/01/2023 1442 EDT cefTRIAXone (ROCEPHIN) 2,000 mg in dextrose 5 % 50 mL IVPB 2,000 mg Intravenous New Bag     06/01/2023 1550 EDT sodium chloride 0 9 % bolus 250 mL 0 mL Intravenous Stopped     06/01/2023 1443 EDT sodium chloride 0 9 % bolus 250 mL 250 mL Intravenous New Bag        Past Medical History:   Diagnosis Date   • Acute metabolic encephalopathy 9/32/0127   • Arthritis    • Bladder mass    • Cardiomyopathy [facility])    • Chest pain    • COPD (chronic obstructive pulmonary disease) ([facility] [address] )    • COVID-19 virus infection 2/23/2023   • CPAP (continuous positive airway pressure) dependence    • Emphysema of lung (HCC)    • Hypoxia     nocturnal   • Left bundle branch block    • Multiple pulmonary nodules     last assessed: 10/12/16   • Pneumonia    • Sleep apnea    • Sleep apnea, obstructive    • Smoker    • Weight loss 8/29/2019     Present on Admission:  • Acute exacerbation of congestive heart failure (HCC)  • Chronic obstructive pulmonary disease (HCC)  • KEVIN and COPD overlap syndrome (HCC)  • Osteoporosis  • Anemia      Admitting Diagnosis: Hypercarbia [R06 89]  Hyponatremia [E87 1]  SOB (shortness of breath) [R06 02]  Acute respiratory failure, unspecified whether with hypoxia or hypercapnia ([facility] [address] ) [J96 00]  Age/Sex: 79 y o  male  Admission Orders:  SCD  Cardio-Pulmonary Monitoring, Neuro Checks, Nursing dysphagia screent, I/O, Daily weights, Vital signs    Scheduled Medications:  aspirin, 81 mg, Oral, Daily  atorvastatin, 40 mg, Oral, Daily With Dinner  budesonide, 0 5 mg, Nebulization, Daily  cefepime, 2,000 mg, Intravenous, Q8H  chlorhexidine, 15 mL, Mouth/Throat, Q12H GABE  docusate sodium, 100 mg, Oral, BID  doxycycline hyclate, 100 mg, Oral, Q12H GABE  enoxaparin, 40 mg, Subcutaneous, Daily  formoterol, 20 mcg, Nebulization, Q12H  guaiFENesin, 1,200 mg, Oral, Q12H GABE  ipratropium, 0 5 mg, Nebulization, TID  furosemide (LASIX) injection 40 mg IV once  levalbuterol, 1 25 mg, Nebulization, TID  melatonin, 3 mg, Oral, HS  methylPREDNISolone sodium succinate, 40 mg, Intravenous, Q8H GABE  tamsulosin, 0 4 mg, Oral, Daily With Dinner  vancomycin, 15 mg/kg, Intravenous, Q12H      Continuous IV Infusions: none     PRN Meds:  acetaminophen, 975 mg, Oral, Q8H PRN        IP CONSULT TO PALLIATIVE CARE  IP CONSULT TO CASE MANAGEMENT  IP CONSULT TO PHARMACY    Network Utilization Review Department  ATTENTION: Please call with any questions or concerns to [phone] and carefully listen to the prompts so that you are directed to the right person  All voicemails are confidential   Gayathri Shaw all requests for admission clinical reviews, approved or denied determinations and any other requests to dedicated fax number below belonging to the campus where the patient is receiving treatment   List of dedicated fax numbers for the Facilities:  [address] [place] [address] DENIALS (Administrative/Medical Necessity) [phone]   [address] (Maternity/NICU/Pediatrics) [phone]   [address] [phone]   [address] [phone]   [address] [place] [address] [place] [phone]   [address] Barrett [facility] [address] [facility] [phone]

## 2023-06-02 NOTE — SPEECH THERAPY NOTE
Speech-Language Pathology Bedside Swallow Evaluation    Patient Name: Susanna Hernández  Today's Date: 6/2/2023     Problem List  Principal Problem:    Acute exacerbation of congestive heart failure (HCC)  Active Problems:    Chronic obstructive pulmonary disease (HCC)    KEVIN and COPD overlap syndrome (HCC)    Osteoporosis    Anemia    Past Medical History  Past Medical History:   Diagnosis Date   • Acute metabolic encephalopathy 6/59/9024   • Arthritis    • Bladder mass    • Cardiomyopathy [facility])    • Chest pain    • COPD (chronic obstructive pulmonary disease) (HCC)    • COVID-19 virus infection 2/23/2023   • CPAP (continuous positive airway pressure) dependence    • Emphysema of lung (HCC)    • Hypoxia     nocturnal   • Left bundle branch block    • Multiple pulmonary nodules     last assessed: 10/12/16   • Pneumonia    • Sleep apnea    • Sleep apnea, obstructive    • Smoker    • Weight loss 8/29/2019     Past Surgical History  Past Surgical History:   Procedure Laterality Date   • COLONOSCOPY     • CYSTOSCOPY     • CYSTOSCOPY  02/17/2021   • LA BLADDER INSTILLATION ANTICARCINOGENIC AGENT N/A 1/3/2020    Procedure: INSTILLATION MITOMYCIN;  Surgeon: Lata Forrester MD;  [facility];  Service: Urology   • LA CYSTO W/REMOVAL OF LESIONS SMALL N/A 1/3/2020    Procedure: TRANSURETHRAL RESECTION OF BLADDER TUMOR (TURBT); Surgeon: Lata Forrester MD;  [facility];  Service: Urology   • LA CYSTOURETHROSCOPY WITH BIOPSY N/A 4/13/2021    Procedure: Cesar Repine;  Surgeon: Lata Forrester MD;  [facility];  Service: Urology   • LA TRURL ELECTROSURG RESCJ PROSTATE BLEED COMPLETE N/A 4/13/2021    Procedure: TRANSURETHRAL RESECTION OF PROSTATE (TURP) BLADDER BIOPSY, FULGURATION;  Surgeon: Lata Forrester MD;  [facility];  Service: Urology       Summary  Pt presented with functional appearing oral and pharyngeal stage swallowing skills with materials administered today   Pt is on HFNC, SOB but was able to tolerate thin liquids and solids without s/s aspiration  Pt is edentulous therefore mastication is prolonged but overall functional for solid foods  Pt reported he often chooses to eat softer foods  Risk/s for Aspiration: mild    Recommended Diet: regular diet and thin liquids   Recommended Form of Meds: whole with liquid   Aspiration precautions and swallowing strategies: upright posture, slow rate of feeding and small bites/sips  Other Recommendations: Continue frequent oral care      Current Medical Status per H&P 6/1/23  HX and PE limited by: Bipap, somnolence  Mary Ramirez  is a 79 y o  male with PMH of COPD (FEV1 22%) chronically on 2L, KEVIN on CPAP at night, chronic HFrEF (EF 20%), osteoporosis, prostate CA s/p TURP, GERD, anemia, HLD who presents from his Heart Failure office for increasing respiratory distress and confusion since this morning  Patient given 125 mg Solu-medrol, albuterol, and atrovent neb en route  He was immediately placed on BiPAP on arrival  BP fell from 132/77 to 86/53 after 30 minutes  , CXR wet read appears as bilateral congestion   VBG pH 7 228, pCO2 96 4, pO2 52 3, HCO 39 3 --> pH 7 250, pCO2 81 1, pO2 81 0, HCO3 34 8 after ~90 minutes on BiPAP  Patient given 250 cc bolus NS and 1 dose of Rocephin in ED  Patient seen by Palliative Care in the ED and wife confirms patient is Level 1 full code, as was confirmed at his Heart Failure visit this AM  He was admitted and intubated in February for COPD exacerbation  He was extubated after 3 days  Patient and wife seen at the bedside by Critical Care team  Per wife, patient had some dry cough yesterday but was otherwise well  This AM she noted him to have worsening SOB and having difficulty ambulating  He reported feeling hot and she noted him to have some nonspecific shaking, as if he were having chills  He did not complain of any symptoms to her   She feels his legs have become increasingly swollen  Special Studies:  CXR 6/1/23 IMPRESSION:  1  Mild right basilar airspace opacity concerning for pneumonia  2   Background emphysematous changes      Social/Education/Vocational Hx:  Pt lives with his wife    Swallow Information   Current Symptoms/Concerns: change in respiratory status  Current Diet: NPO   Baseline Diet: regular diet and thin liquids      Baseline Assessment   Behavior/Cognition: alert  Speech/Language Status: able to participate in conversation and able to follow commands  Patient Positioning: upright in bed  Pain Status/Interventions/Response to Interventions: No report of or nonverbal indications of pain  Swallow Mechanism Exam  Facial: symmetrical  Labial: WFL  Lingual: WFL  Velum: symmetrical  Mandible: adequate ROM  Dentition: edentulous  Vocal quality:weak   Volitional Cough: weak   Respiratory Status: on HFNC    Consistencies Assessed and Performance   Consistencies Administered: thin liquids, puree and hard solids    Oral Stage: WFL  Mastication was prolonged but adequate with the materials administered today  Bolus formation and transfer were functional with no significant oral residue noted following liquid wash  No overt s/s reduced oral control  Pharyngeal Stage: WFL  Swallow Mechanics: Swallowing initiation appeared prompt  Laryngeal rise was palpated and judged to be within functional limits  No coughing, throat clearing, change in vocal quality or respiratory status noted today  Esophageal Concerns: Hx GERD      Summary and Recommendations (see above)    Results Reviewed with: patient, RN and MD     Treatment Recommended: No additional ST f/u needed at this time  Please re consult with any new changes or concerns

## 2023-06-02 NOTE — PLAN OF CARE
Problem: MOBILITY - ADULT  Goal: Maintain or return to baseline ADL function  Description: INTERVENTIONS:  -  Assess patient's ability to carry out ADLs; assess patient's baseline for ADL function and identify physical deficits which impact ability to perform ADLs (bathing, care of mouth/teeth, toileting, grooming, dressing, etc )  - Assess/evaluate cause of self-care deficits   - Assess range of motion  - Assess patient's mobility; develop plan if impaired  - Assess patient's need for assistive devices and provide as appropriate  - Encourage maximum independence but intervene and supervise when necessary  - Involve family in performance of ADLs  - Assess for home care needs following discharge   - Consider OT consult to assist with ADL evaluation and planning for discharge  - Provide patient education as appropriate  Outcome: Progressing  Goal: Maintains/Returns to pre admission functional level  Description: INTERVENTIONS:  - Perform BMAT or MOVE assessment daily    - Set and communicate daily mobility goal to care team and patient/family/caregiver  - Collaborate with rehabilitation services on mobility goals if consulted  - Perform Range of Motion 6 times a day  - Reposition patient every 2 hours    - Dangle patient 3 times a day  - Stand patient 3 times a day  - Ambulate patient 3 times a day  - Out of bed to chair 3 times a day   - Out of bed for meals 3 times a day  - Out of bed for toileting  - Record patient progress and toleration of activity level   Outcome: Progressing

## 2023-06-02 NOTE — CASE MANAGEMENT
Case Management Assessment & Discharge Planning Note    Patient name Macey Sevilla    Location MICU 11/MICU 11 MRN [id number]  : 1955 Date 2023       Current Admission Date: 2023  Current Admission Diagnosis:Acute exacerbation of congestive heart failure [facility])   Patient Active Problem List    Diagnosis Date Noted   • [facility] exacerbation [facility] ) 2023   • Moderate protein-calorie malnutrition ([facility] ) 2023   • HLD (hyperlipidemia) 2023   • COPD exacerbation ([place] [address] ) 2023   • Hyperglycemia 2023   • Physical deconditioning 2023   • Anemia 2023   • Chronic HFrEF (heart failure with reduced ejection fraction) ([place] [address] ) 2022   • Protein-calorie malnutrition ([place] [address] ) 2022   • Pulmonary nodules/lesions, multiple 2022   • Prostate cancer ([place] [address] ) 2021   • BPH with obstruction/lower urinary tract symptoms 2021   • Goals of care, counseling/discussion 2021   • Chronic respiratory failure with hypoxia and hypercapnia ([place] [address] ) 2020   • Macrocytosis without anemia 2019   • Other insomnia 2019   • Gastroesophageal reflux disease 2017   • Nonobstructive atherosclerosis of coronary artery 2016   • Mood disorder ([place] [address] ) 2015   • Impaired fasting glucose 2015   • Osteoporosis 10/14/2014   • Vitamin D deficiency 10/14/2014   • Nonischemic cardiomyopathy ([place] [address] ) 2014   • Left bundle-branch block 2013   • Osteoarthritis 2013   • KEVIN and COPD overlap syndrome ([facility] ) 2013   • Chronic obstructive pulmonary disease ([facility] ) 2012      LOS (days): 1  Geometric Mean LOS (GMLOS) (days):   Days to GMLOS:     OBJECTIVE:  PATIENT READMITTED TO HOSPITAL  Risk of Unplanned Readmission Score: 34 58         Current admission status: Inpatient       Preferred Pharmacy:   [facility]/pharm[id number], [place] - [address] [place] [id number]  Phone: [phone] Fax: [phone]    Primary Care Provider: Hi Welch DO    Primary Insurance: BLUE CROSS  Secondary Insurance: MEDICARE    ASSESSMENT:  [address], [address] [facility] Representative - Spouse   Primary Phone: [phone] (Mobile)  Home Phone: [phone]                         Readmission Root Cause  30 Day Readmission: Yes  Who directed you to return to the hospital?: Family  Did you understand whom to contact if you had questions or problems?: Yes  Did you get your prescriptions before you left the hospital?: Yes  Were you able to get your prescriptions filled when you left the hospital?: Yes  Did you take your medications as prescribed?: Yes  Were you able to get to your follow-up appointments?: Yes  During previous admission, was a post-acute recommendation made?: Yes  What post-acute resources were offered?: [facility], Offered, but declined  Patient was readmitted due to: Acute on chronic hypoxic and hypercarbic respiratory failure due to multifocal pneumonia and acute on chronic systolic congestive heart failure, with possible acute exacerbation of COPD  Action Plan: admit to MICU    Patient Information  Admitted from[de-identified] Home  Mental Status: Alert  During Assessment patient was accompanied by: Spouse  Assessment information provided by[de-identified] Spouse  Primary Caregiver: Self  Support Systems: Spouse/significant other  [place] of Residence: [address] [place] [address] do you live in?: [facility] entry access options   Select all that apply : Stairs  Number of steps to enter home : 3  Type of Current Residence: [facility]  In the last 12 months, was there a time when you were not able to pay the mortgage or rent on time?: No  In the last 12 months, was there a time when you did not have a steady place to sleep or slept in a shelter (including now)?: No  Homeless/housing insecurity resource given?: N/A  Living Arrangements: Lives w/ Spouse/significant other  Is patient a ?: No    Activities of Daily Living Prior to Admission  Functional Status: Assistance  Completes ADLs independently?: No  Level of ADL dependence: Assistance  Ambulates independently?: Yes  Does patient use assisted devices?: Yes  Assisted Devices (DME) used:  Wheelchair, Home Oxygen concentrator, Portable Oxygen tanks, Shower Chair, Other (Comment) (raised toilet seat)  DME Company Name (respiratory supplies): [employer]  O2 Rate(s): 3L  Does patient currently own DME?: Yes  What DME does the patient currently own?: Home Oxygen concentrator, Portable Oxygen tanks, Wheelchair, Shower Chair, Other (Comment) (raised toilet seat)  Does the patient have a history of Short-Term Rehab?: Yes ([facility] in February 2023)  Does patient have a history of HHC?: Yes ([facility])  Does patient currently have [facility]?: No (referred last admission but ultimately declined)         Patient Information Continued  Income Source: Pension/[employer]  Does patient have prescription coverage?: Yes  Within the past 12 months, you worried that your food would run out before you got the money to buy more : Never true  Within the past 12 months, the food you bought just didn't last and you didn't have money to get more : Never true  Food insecurity resource given?: N/A  Does patient receive dialysis treatments?: No  Does patient have a history of substance abuse?: No  Does patient have a history of Mental Health Diagnosis?: No         Means of Transportation  In the past 12 months, has lack of transportation kept you from medical appointments or from getting medications?: No  In the past 12 months, has lack of transportation kept you from meetings, work, or from getting things needed for daily living?: No        DISCHARGE DETAILS:    Discharge planning discussed with[de-identified] patient/wife at bedside  Freedom of Choice: Yes     CM contacted family/caregiver?: Yes  Were Treatment Team discharge recommendations reviewed with patient/caregiver?: Yes  Did patient/caregiver verbalize understanding of patient care needs?: Yes  Were patient/caregiver advised of the risks associated with not following Treatment Team discharge recommendations?: Yes    Contacts  Patient Contacts: Diamond Baker, wife  Relationship to Patient[de-identified] Family  Contact Method: Phone, In Person  Phone Number: [phone]  Reason/Outcome: Continuity of Care, Emergency Contact, Discharge Planning                        Treatment Team Recommendation: Other (TBD)  Discharge Destination Plan[de-identified] Other (TBD -- awaiting recommendations)  Transport at Discharge : Family                   Patient/caregiver received discharge checklist   Content reviewed  Patient/caregiver encouraged to participate in discharge plan of care prior to discharge home  CM reviewed d/c planning process including the following: identifying help at home, patient preference for d/c planning needs, Discharge Lounge, [facility] Meds to Bed program, availability of treatment team to discuss questions or concerns patient and/or family may have regarding understanding medications and recognizing signs and symptoms once discharged  CM also encouraged patient to follow up with all recommended appointments after discharge  Patient advised of importance for patient and family to participate in managing patient’s medical well being  Additional Comments: Introduced self and role to patient and wife at bedside  Patient was recently discharged, [facility] was arranged, however patient ultimately declined  [facility] re-referred; CM will continue to follow for d/c needs and place additional referrals as appropriate

## 2023-06-03 LAB
ANION GAP SERPL CALCULATED.3IONS-SCNC: -1 MMOL/L (ref 4–13)
ANION GAP SERPL CALCULATED.3IONS-SCNC: -1 MMOL/L (ref 4–13)
ANION GAP SERPL CALCULATED.3IONS-SCNC: 0 MMOL/L (ref 4–13)
ANION GAP SERPL CALCULATED.3IONS-SCNC: 2 MMOL/L (ref 4–13)
ATRIAL RATE: 87 BPM
BUN SERPL-MCNC: 24 MG/DL (ref 5–25)
BUN SERPL-MCNC: 25 MG/DL (ref 5–25)
CALCIUM SERPL-MCNC: 9 MG/DL (ref 8.3–10.1)
CALCIUM SERPL-MCNC: 9.1 MG/DL (ref 8.3–10.1)
CALCIUM SERPL-MCNC: 9.2 MG/DL (ref 8.3–10.1)
CALCIUM SERPL-MCNC: 9.3 MG/DL (ref 8.3–10.1)
CHLORIDE SERPL-SCNC: 82 MMOL/L (ref 96–108)
CHLORIDE SERPL-SCNC: 83 MMOL/L (ref 96–108)
CHLORIDE SERPL-SCNC: 83 MMOL/L (ref 96–108)
CHLORIDE SERPL-SCNC: 84 MMOL/L (ref 96–108)
CO2 SERPL-SCNC: 39 MMOL/L (ref 21–32)
CO2 SERPL-SCNC: 42 MMOL/L (ref 21–32)
CO2 SERPL-SCNC: 42 MMOL/L (ref 21–32)
CO2 SERPL-SCNC: 43 MMOL/L (ref 21–32)
CREAT SERPL-MCNC: 0.55 MG/DL (ref 0.6–1.3)
CREAT SERPL-MCNC: 0.61 MG/DL (ref 0.6–1.3)
CREAT SERPL-MCNC: 0.63 MG/DL (ref 0.6–1.3)
CREAT SERPL-MCNC: 0.69 MG/DL (ref 0.6–1.3)
ERYTHROCYTE [DISTWIDTH] IN BLOOD BY AUTOMATED COUNT: 12.3 % (ref 11.6–15.1)
GFR SERPL CREATININE-BSD FRML MDRD: 102 ML/MIN/1.73SQ M
GFR SERPL CREATININE-BSD FRML MDRD: 104 ML/MIN/1.73SQ M
GFR SERPL CREATININE-BSD FRML MDRD: 108 ML/MIN/1.73SQ M
GFR SERPL CREATININE-BSD FRML MDRD: 98 ML/MIN/1.73SQ M
GLUCOSE SERPL-MCNC: 132 MG/DL (ref 65–140)
GLUCOSE SERPL-MCNC: 137 MG/DL (ref 65–140)
GLUCOSE SERPL-MCNC: 203 MG/DL (ref 65–140)
GLUCOSE SERPL-MCNC: 222 MG/DL (ref 65–140)
HCT VFR BLD AUTO: 33.9 % (ref 36.5–49.3)
HGB BLD-MCNC: 11.3 G/DL (ref 12–17)
MCH RBC QN AUTO: 31.6 PG (ref 26.8–34.3)
MCHC RBC AUTO-ENTMCNC: 33.3 G/DL (ref 31.4–37.4)
MCV RBC AUTO: 95 FL (ref 82–98)
MRSA NOSE QL CULT: ABNORMAL
P AXIS: 95 DEGREES
PLATELET # BLD AUTO: 178 THOUSANDS/UL (ref 149–390)
PMV BLD AUTO: 9.2 FL (ref 8.9–12.7)
POTASSIUM SERPL-SCNC: 3.5 MMOL/L (ref 3.5–5.3)
POTASSIUM SERPL-SCNC: 3.9 MMOL/L (ref 3.5–5.3)
POTASSIUM SERPL-SCNC: 4 MMOL/L (ref 3.5–5.3)
POTASSIUM SERPL-SCNC: 4.3 MMOL/L (ref 3.5–5.3)
PR INTERVAL: 167 MS
PROCALCITONIN SERPL-MCNC: 0.22 NG/ML
QRS AXIS: 87 DEGREES
QRSD INTERVAL: 125 MS
QT INTERVAL: 379 MS
QTC INTERVAL: 456 MS
RBC # BLD AUTO: 3.58 MILLION/UL (ref 3.88–5.62)
SODIUM SERPL-SCNC: 124 MMOL/L (ref 135–147)
SODIUM SERPL-SCNC: 124 MMOL/L (ref 135–147)
SODIUM SERPL-SCNC: 125 MMOL/L (ref 135–147)
SODIUM SERPL-SCNC: 125 MMOL/L (ref 135–147)
T WAVE AXIS: 257 DEGREES
VANCOMYCIN SERPL-MCNC: 18.7 UG/ML (ref 10–20)
VENTRICULAR RATE: 87 BPM
WBC # BLD AUTO: 3.07 THOUSAND/UL (ref 4.31–10.16)

## 2023-06-03 PROCEDURE — 85027 COMPLETE CBC AUTOMATED: CPT

## 2023-06-03 PROCEDURE — 80202 ASSAY OF VANCOMYCIN: CPT | Performed by: INTERNAL MEDICINE

## 2023-06-03 PROCEDURE — 99233 SBSQ HOSP IP/OBS HIGH 50: CPT | Performed by: INTERNAL MEDICINE

## 2023-06-03 PROCEDURE — 80048 BASIC METABOLIC PNL TOTAL CA: CPT

## 2023-06-03 PROCEDURE — 93010 ELECTROCARDIOGRAM REPORT: CPT | Performed by: INTERNAL MEDICINE

## 2023-06-03 PROCEDURE — 94003 VENT MGMT INPAT SUBQ DAY: CPT

## 2023-06-03 PROCEDURE — 94640 AIRWAY INHALATION TREATMENT: CPT

## 2023-06-03 PROCEDURE — 84145 PROCALCITONIN (PCT): CPT

## 2023-06-03 PROCEDURE — 93005 ELECTROCARDIOGRAM TRACING: CPT

## 2023-06-03 PROCEDURE — 94664 DEMO&/EVAL PT USE INHALER: CPT

## 2023-06-03 PROCEDURE — 94760 N-INVAS EAR/PLS OXIMETRY 1: CPT

## 2023-06-03 RX ORDER — FUROSEMIDE 10 MG/ML
40 INJECTION INTRAMUSCULAR; INTRAVENOUS ONCE
Status: DISCONTINUED | OUTPATIENT
Start: 2023-06-03 | End: 2023-06-03

## 2023-06-03 RX ORDER — POTASSIUM CHLORIDE 20 MEQ/1
20 TABLET, EXTENDED RELEASE ORAL ONCE
Status: COMPLETED | OUTPATIENT
Start: 2023-06-03 | End: 2023-06-03

## 2023-06-03 RX ORDER — TORSEMIDE 20 MG/1
20 TABLET ORAL DAILY
Status: DISCONTINUED | OUTPATIENT
Start: 2023-06-03 | End: 2023-06-12 | Stop reason: HOSPADM

## 2023-06-03 RX ORDER — ECHINACEA PURPUREA EXTRACT 125 MG
1 TABLET ORAL
Status: DISCONTINUED | OUTPATIENT
Start: 2023-06-03 | End: 2023-06-12 | Stop reason: HOSPADM

## 2023-06-03 RX ADMIN — ENOXAPARIN SODIUM 40 MG: 40 INJECTION SUBCUTANEOUS at 09:26

## 2023-06-03 RX ADMIN — DOCUSATE SODIUM 100 MG: 100 CAPSULE, LIQUID FILLED ORAL at 09:26

## 2023-06-03 RX ADMIN — IPRATROPIUM BROMIDE 0.5 MG: 0.5 SOLUTION RESPIRATORY (INHALATION) at 13:53

## 2023-06-03 RX ADMIN — IPRATROPIUM BROMIDE 0.5 MG: 0.5 SOLUTION RESPIRATORY (INHALATION) at 07:15

## 2023-06-03 RX ADMIN — BUDESONIDE 0.5 MG: 0.5 INHALANT ORAL at 07:15

## 2023-06-03 RX ADMIN — VANCOMYCIN HYDROCHLORIDE 750 MG: 750 INJECTION, SOLUTION INTRAVENOUS at 07:00

## 2023-06-03 RX ADMIN — VANCOMYCIN HYDROCHLORIDE 750 MG: 750 INJECTION, SOLUTION INTRAVENOUS at 17:00

## 2023-06-03 RX ADMIN — CEFEPIME 2000 MG: 2 INJECTION, POWDER, FOR SOLUTION INTRAVENOUS at 06:21

## 2023-06-03 RX ADMIN — FORMOTEROL FUMARATE DIHYDRATE 20 MCG: 20 SOLUTION RESPIRATORY (INHALATION) at 20:05

## 2023-06-03 RX ADMIN — LEVALBUTEROL HYDROCHLORIDE 1.25 MG: 1.25 SOLUTION RESPIRATORY (INHALATION) at 13:53

## 2023-06-03 RX ADMIN — TORSEMIDE 20 MG: 20 TABLET ORAL at 12:53

## 2023-06-03 RX ADMIN — ASPIRIN 81 MG: 81 TABLET, COATED ORAL at 09:26

## 2023-06-03 RX ADMIN — GUAIFENESIN 1200 MG: 600 TABLET, EXTENDED RELEASE ORAL at 21:02

## 2023-06-03 RX ADMIN — GUAIFENESIN 1200 MG: 600 TABLET, EXTENDED RELEASE ORAL at 09:26

## 2023-06-03 RX ADMIN — CHLORHEXIDINE GLUCONATE 15 ML: 1.2 SOLUTION ORAL at 09:26

## 2023-06-03 RX ADMIN — SALINE NASAL SPRAY 1 SPRAY: 1.5 SOLUTION NASAL at 22:24

## 2023-06-03 RX ADMIN — CHLORHEXIDINE GLUCONATE 15 ML: 1.2 SOLUTION ORAL at 21:02

## 2023-06-03 RX ADMIN — DOXYCYCLINE 100 MG: 100 CAPSULE ORAL at 21:02

## 2023-06-03 RX ADMIN — DOCUSATE SODIUM 100 MG: 100 CAPSULE, LIQUID FILLED ORAL at 17:00

## 2023-06-03 RX ADMIN — FORMOTEROL FUMARATE DIHYDRATE 20 MCG: 20 SOLUTION RESPIRATORY (INHALATION) at 07:17

## 2023-06-03 RX ADMIN — CEFEPIME 2000 MG: 2 INJECTION, POWDER, FOR SOLUTION INTRAVENOUS at 21:02

## 2023-06-03 RX ADMIN — CEFEPIME 2000 MG: 2 INJECTION, POWDER, FOR SOLUTION INTRAVENOUS at 12:53

## 2023-06-03 RX ADMIN — TAMSULOSIN HYDROCHLORIDE 0.4 MG: 0.4 CAPSULE ORAL at 17:00

## 2023-06-03 RX ADMIN — METHYLPREDNISOLONE SODIUM SUCCINATE 40 MG: 40 INJECTION, POWDER, FOR SOLUTION INTRAMUSCULAR; INTRAVENOUS at 21:02

## 2023-06-03 RX ADMIN — LEVALBUTEROL HYDROCHLORIDE 1.25 MG: 1.25 SOLUTION RESPIRATORY (INHALATION) at 20:05

## 2023-06-03 RX ADMIN — ATORVASTATIN CALCIUM 40 MG: 40 TABLET, FILM COATED ORAL at 17:00

## 2023-06-03 RX ADMIN — POTASSIUM CHLORIDE 20 MEQ: 1500 TABLET, EXTENDED RELEASE ORAL at 06:21

## 2023-06-03 RX ADMIN — IPRATROPIUM BROMIDE 0.5 MG: 0.5 SOLUTION RESPIRATORY (INHALATION) at 20:05

## 2023-06-03 RX ADMIN — METHYLPREDNISOLONE SODIUM SUCCINATE 40 MG: 40 INJECTION, POWDER, FOR SOLUTION INTRAMUSCULAR; INTRAVENOUS at 14:52

## 2023-06-03 RX ADMIN — MELATONIN 3 MG: at 21:02

## 2023-06-03 RX ADMIN — DOXYCYCLINE 100 MG: 100 CAPSULE ORAL at 09:26

## 2023-06-03 RX ADMIN — METHYLPREDNISOLONE SODIUM SUCCINATE 40 MG: 40 INJECTION, POWDER, FOR SOLUTION INTRAMUSCULAR; INTRAVENOUS at 06:21

## 2023-06-03 RX ADMIN — LEVALBUTEROL HYDROCHLORIDE 1.25 MG: 1.25 SOLUTION RESPIRATORY (INHALATION) at 07:15

## 2023-06-03 NOTE — PROGRESS NOTES
[address] [facility]  Progress Note: Critical Care  Name: Jasen Carrasco  79 y o  male I MRN: [id number]  Unit/Bed#: ICCU 205-01 I Date of Admission: 6/1/2023   Date of Service: 6/3/2023 I Hospital Day: 2    Assessment/Plan   Neuro:   • Diagnosis: Acute metabolic metabolic encephalopathy  ? 2/2 hypercapnia  - More likely due to CHF, COPD, less likely pneumonia  - SpO2 100% on BiPAP 22/10 FiO2 40% RR 14 w/ 70-80% leakage  - Mentation: open eyes, follows commands, answers questions verbally, eagered to get BiPAP off  ? Plan:  - HFNC days, BiPAP night  - HFNC: 50 L FiO2 40%  - BiPAP: 22/10, FiO2 30%, RR 14  - Follow mentation  - Order blood gas labs if mentation is worsen  - CAM-ICU  - Delirium precautions  - Treatment of underlying metabolic derangement  • Diagnosis: Analgesia  ? Plan:  · Tylenol PRN      CV:   • Diagnosis: Acute on chronic HFrEF  ? Per outpatient HF note on morning of admission, current diuretic is torsemide 20 mg daily  ? Last echo 5/1/23: EF 20%, severe global hypokinesis, RVSP 30 00  ?   ? I/Os  - Intake: 770  - Output: -2051  - Net: -1281  - Goal net >=-1 L  ? Plan:   - IV Lasix 40 mg x1 given 6/1  - IV Lasix 40 mg x1 given 6/2  · Start home med torsemide 20 mg qd tomorrow unless requiring additional IV doses    • Diagnosis: Hypotension  - Unclear etiology  - Plan:  - Continue to monitor  - Avoid fluids as patient is hypervolemic    • Diagnosis: Nonobstructive CAD  ? Trop 28 --> 27  ? Plan:  - Cont ASA, statin    • Diagnosis: HLD  ? Plan:  - Atorvastatin 40 mg qd      Pulm:  • Diagnosis: Acute on chronic hypercapnic respiratory failure, chronically on 2L and CPAP at night  ? Suspect heart failure exacerbation most likely, also possibly COPD exacerbation and less likely pneumonia  ? 6/2 6am VBG:   - pH: 7 356  - CO2: 79 1  - HCO3: 43 3  ? BIPAP overnight, settings 22/10 FiO2 40% RR 14 w/ 70-80% leakage  ? Given 1 dose IV ceftriaxone in ED   ? Plan:  ?  HFNC during day, BiPAP at night  - HFNC: 50 L FiO2 40%  - BiPAP: 22/10, FiO2 30%, RR 14  - IV Lasix 40 mg x1 given 6/1  - IV Lasix 40 mg x1 given 6/2  - Start home med torsemide 20 mg qd tomorrow unless requiring additional IV doses  · Started vancomycin+cefepime+doxycycline per DRIP score (5)  · Procal downtrending 0 22, down from 0 27 6/2  - Continue Xopenex (levalbuterol), atrovent (ipratropium), Perforomist (formoterol), Pulmicort (budesonide), mucinex  · Solu-Medrol 40 mg TID     • Diagnosis: COPD  ? Plan:  - Plan as above     • Diagnosis: KEVIN on CPAP  ? Plan:  - BiPAP at night         GI:   • Diagnosis: GERD  ? Plan:  - Not on PPI/H2RA at home  • Diagnosis: Bowel regimen  ? Plan:  · Colace      :   • Diagnosis: Prostate CA s/p TURP  ? Plan:  - Continue Flomax  • Diagnosis: Hyponatremia  ? Na 121 on admission, acute  ? Suspect due to CHF as patient is hypervolemic  ? Na stable at 125  ? Plan:  - Diurese as above  · Continue to trend Na      F/E/N:   ? F- none  ? E: K>4, Mg>2, Phos>3  ? Na stable at 125 since 1pm 6/2  ? N: Regular house, sodium restricted 2 GM      Heme/Onc:   • Diagnosis: Normocytic/macrocytic anemia  ? Baseline 8-9s  ? 6/2: 11 3, MCV 95  ? Plan:  - Continue to trend  - Transfuse Hgb <7     • Diagnosis: Leukopenia  ? 6/3 WBC 3 07, down from 3 96 6/2  ? Plan  ? Continue to monitor   ? Continue with abx       Endo:   • Diagnosis: Osteoporosis  ? Plan:  - Was on bisphosphonate in the past, does not appear to be on them now      ID:   • Diagnosis: Low suspicion for pneumonia  ? Procal 0 22 6/3, down from 0 27 6/2  ? Plan:  ? Started vancomycin+cefepime+doxycycline per DRIP score  ? Continue to monitor      MSK/Skin:   • Diagnosis: Ambulatory dysfunction  ? Chronically uses a walker and sometimes a wheelchair  ? Wife reports he needed wheelchair this morning for increased difficulty ambulating  ?  Plan:  - Frequent repositioning  · PT/OT when appropriate      Disposition: Stepdown Level 1    ICU Core Measures     A: Assess, Prevent, and Manage Pain · Has pain been assessed? Yes  · Need for changes to pain regimen? No   B: Both SAT/SAT  · N/A   C: Choice of Sedation · RASS Goal: 0 Alert and Calm  · Need for changes to sedation or analgesia regimen? No   D: Delirium · CAM-ICU: Negative   E: Early Mobility  · Plan for early mobility? NA   F: Family Engagement · Plan for family engagement today? Yes     Antibiotic Review: Awaiting culture results  Prophylaxis:  VTE VTE covered by:  enoxaparin, Subcutaneous, 40 mg at 06/02/23 0841       Stress Ulcer  not ordered        Subjective   HPI/24hr events:     HX and PE limited by: Bipap, somnolence  Feliberto Ahumada Jr  is a 79 y  o  male with PMH of COPD (FEV1 22%) chronically on 2L, KEVIN on CPAP at night, chronic HFrEF (EF 20%), osteoporosis, prostate CA s/p TURP, GERD, anemia, HLD who presents from his Heart Failure office for increasing respiratory distress and confusion since this morning  Patient given 125 mg Solu-medrol, albuterol, and atrovent neb en route  He was immediately placed on BiPAP on arrival  BP fell from 132/77 to 86/53 after 30 minutes  , CXR wet read appears as bilateral congestion   VBG pH 7 228, pCO2 96 4, pO2 52 3, HCO 39 3 --> pH 7 250, pCO2 81 1, pO2 81 0, HCO3 34 8 after ~90 minutes on BiPAP  Patient given 250 cc bolus NS and 1 dose of Rocephin in ED      Patient seen by Palliative Care in the ED and wife confirms patient is Level 1 full code, as was confirmed at his Heart Failure visit this AM  He was admitted and intubated in February for COPD exacerbation  He was extubated after 3 days      Patient and wife seen at the bedside by Critical Care team  Per wife, patient had some dry cough yesterday but was otherwise well  This AM she noted him to have worsening SOB and having difficulty ambulating  He reported feeling hot and she noted him to have some nonspecific shaking, as if he were having chills   He did not complain of any symptoms to her  She feels his legs have become increasingly swollen      History obtained from chart review and wife  24 hour events: no overnight events, reports some increased in breathing on BiPAP overnight and HFNC this morning    Review of Systems   Constitutional: Positive for fever  Negative for chills  HENT: Positive for sore throat  Respiratory: Positive for cough (Dry) and shortness of breath (on both HFNC and BiPAP)  Cardiovascular: Positive for leg swelling  Negative for chest pain  Gastrointestinal: Negative for abdominal distention and abdominal pain  Genitourinary: Negative for dysuria  Musculoskeletal: Negative for arthralgias  Neurological: Negative for dizziness and headaches  Objective                            Vitals I/O      Most Recent Min/Max in 24hrs   Temp (!) 97 °F (36 1 °C) Temp  Min: 97 °F (36 1 °C)  Max: 98 8 °F (37 1 °C)   Pulse 90 Pulse  Min: 80  Max: 120   Resp 22 Resp  Min: 17  Max: 33   /72 BP  Min: 94/64  Max: 133/78   O2 Sat 95 % SpO2  Min: 91 %  Max: 100 %      Intake/Output Summary (Last 24 hours) at 6/3/2023 0634  Last data filed at 6/3/2023 0327  Gross per 24 hour   Intake 770 ml   Output 2051 ml   Net -1281 ml         Diet Regular; Regular House; Sodium 2 GM     Invasive Monitoring Physical exam    Physical Exam  HENT:      Head: Normocephalic and atraumatic  Eyes:      Conjunctiva/sclera: Conjunctivae normal    Cardiovascular:      Rate and Rhythm: Normal rate and regular rhythm  Comments: Difficult hearing heart sounds  Pulmonary:      Breath sounds: Wheezing present  Comments: Expiratory wheezing    Abdominal:      General: Abdomen is flat  Bowel sounds are normal  There is no distension  Palpations: Abdomen is soft  Tenderness: There is no abdominal tenderness  Musculoskeletal:      Cervical back: Neck supple  Right lower leg: Edema present  Left lower leg: Edema present        Comments: 2+   Skin:     General: Skin is warm and dry  Capillary Refill: Capillary refill takes less than 2 seconds  Neurological:      General: No focal deficit present  Mental Status: He is alert  Psychiatric:         Mood and Affect: Mood normal           Diagnostic Studies    EKG:   Imaging:  I have personally reviewed pertinent reports         Medications:  Scheduled PRN   aspirin, 81 mg, Daily  atorvastatin, 40 mg, Daily With Dinner  budesonide, 0 5 mg, Daily  cefepime, 2,000 mg, Q8H  chlorhexidine, 15 mL, Q12H GABE  docusate sodium, 100 mg, BID  doxycycline hyclate, 100 mg, Q12H GABE  enoxaparin, 40 mg, Daily  formoterol, 20 mcg, Q12H  guaiFENesin, 1,200 mg, Q12H GABE  ipratropium, 0 5 mg, TID  levalbuterol, 1 25 mg, TID  melatonin, 3 mg, HS  methylPREDNISolone sodium succinate, 40 mg, Q8H GABE  tamsulosin, 0 4 mg, Daily With Dinner  vancomycin, 15 mg/kg, Q12H      acetaminophen, 975 mg, Q8H PRN       Continuous          Labs:    CBC    Recent Labs     06/02/23  0606 06/03/23  0534   HCT 33 5* 33 9*   HGB 10 6* 11 3*    178   WBC 3 96* 3 07*     BMP    Recent Labs     06/03/23  0057 06/03/23  0534   AGAP -1* -1*   BUN 25 25   CALCIUM 9 2 9 3   CL 84* 83*   CO2 42* 43*   CREATININE 0 55* 0 61   K 3 9 4 0   SODIUM 125* 125*       Coags    Recent Labs     06/01/23  1245   INR 0 84   PTT 26        Additional Electrolytes  Recent Labs     06/02/23  0606 06/02/23  1340 06/02/23  1409   CAIONIZED 1 06*  --  0 99*   MG 2 0 2 0  --    PHOS 3 5 3 6  --           Blood Gas    No recent results  Recent Labs     06/02/23  0606   BEVEN 14 5   YVB7RDT 43 3*   VJE7XCQ 79 1*   PHVEN 7 356   PO2VEN 56 7*    LFTs  No recent results    Infectious  Recent Labs     06/02/23  0606 06/03/23  0534   PROCALCITONI 0 27* 0 22     Glucose  Recent Labs     06/02/23  0853 06/02/23  1340 06/03/23  0057 06/03/23  0534   GLUC 91 127 137 132             Arpit Perez   MS4

## 2023-06-03 NOTE — PROGRESS NOTES
Miriam Govea  is a 79 y o  male who is currently ordered Vancomycin IV with management by the Pharmacy Consult service  Relevant clinical data and objective / subjective history reviewed  Vancomycin Assessment:  Indication and Goal AUC/Trough: Pneumonia (goal -600, trough >10), -600, trough >10  Clinical Status: stable but remains critically ill  Micro:   MRSA nares pending    Renal Function:  SCr: 0 61 mg/dL  CrCl: 64 2 mL/min  Renal replacement: Not on dialysis  Days of Therapy: 2  Current Dose: 750 mg IV q12h    Vancomycin Plan:  New Dosing: CONTINUE 750 mg IV Q12H  Estimated AUC: 595 mcg*hr/mL  Estimated Trough: 18 4 mcg/mL  Next Level: 6/9 with AM labs  Renal Function Monitoring: Daily BMP and UOP    Pharmacy will continue to follow closely for s/sx of nephrotoxicity, infusion reactions and appropriateness of therapy  BMP and CBC will be ordered per protocol  We will continue to follow the patient’s culture results and clinical progress daily

## 2023-06-03 NOTE — PHYSICAL THERAPY NOTE
Physical Therapy Cancellation Note         06/03/23 0833   PT Last Visit   PT Visit Date 06/03/23   Note Type   Note type Evaluation; Cancelled Session   Cancel Reasons Medical status   Additional Comments Bipap vs HFNC- respiratory status  ORDERS FOR PT EVALUATION RECEIVED AND CHART REVIEW COMPLETED  PT NOT APPROPRIATE FOR PARTICIPATION IN PT AT THIS TIME 2* MEDICAL STATUS  WILL CONTINUE TO FOLLOW AND COMPLETE PT EVALUATION AS MEDICAL STATUS DICTATES     Carie Sosa PT

## 2023-06-03 NOTE — PLAN OF CARE
Problem: MOBILITY - ADULT  Goal: Maintain or return to baseline ADL function  Description: INTERVENTIONS:  -  Assess patient's ability to carry out ADLs; assess patient's baseline for ADL function and identify physical deficits which impact ability to perform ADLs (bathing, care of mouth/teeth, toileting, grooming, dressing, etc )  - Assess/evaluate cause of self-care deficits   - Assess range of motion  - Assess patient's mobility; develop plan if impaired  - Assess patient's need for assistive devices and provide as appropriate  - Encourage maximum independence but intervene and supervise when necessary  - Involve family in performance of ADLs  - Assess for home care needs following discharge   - Consider OT consult to assist with ADL evaluation and planning for discharge  - Provide patient education as appropriate  Outcome: Progressing  Goal: Maintains/Returns to pre admission functional level  Description: INTERVENTIONS:  - Perform BMAT or MOVE assessment daily    - Set and communicate daily mobility goal to care team and patient/family/caregiver  - Collaborate with rehabilitation services on mobility goals if consulted  - Perform Range of Motion   times a day  - Reposition patient every   hours    - Dangle patient   times a day  - Stand patient   times a day  - Ambulate patient   times a day  - Out of bed to chair   times a day   - Out of bed for meals       times a day  - Out of bed for toileting  - Record patient progress and toleration of activity level   Outcome: Progressing     Problem: Prexisting or High Potential for Compromised Skin Integrity  Goal: Skin integrity is maintained or improved  Description: INTERVENTIONS:  - Identify patients at risk for skin breakdown  - Assess and monitor skin integrity  - Assess and monitor nutrition and hydration status  - Monitor labs   - Assess for incontinence   - Turn and reposition patient  - Assist with mobility/ambulation  - Relieve pressure over bony prominences  - Avoid friction and shearing  - Provide appropriate hygiene as needed including keeping skin clean and dry  - Evaluate need for skin moisturizer/barrier cream  - Collaborate with interdisciplinary team   - Patient/family teaching  - Consider wound care consult   Outcome: Progressing

## 2023-06-04 LAB
ANION GAP SERPL CALCULATED.3IONS-SCNC: -1 MMOL/L (ref 4–13)
ANION GAP SERPL CALCULATED.3IONS-SCNC: 0 MMOL/L (ref 4–13)
ANION GAP SERPL CALCULATED.3IONS-SCNC: 4 MMOL/L (ref 4–13)
BASOPHILS # BLD AUTO: 0 THOUSANDS/ÂΜL (ref 0–0.1)
BASOPHILS NFR BLD AUTO: 0 % (ref 0–1)
BUN SERPL-MCNC: 23 MG/DL (ref 5–25)
BUN SERPL-MCNC: 24 MG/DL (ref 5–25)
BUN SERPL-MCNC: 26 MG/DL (ref 5–25)
CALCIUM SERPL-MCNC: 8.5 MG/DL (ref 8.3–10.1)
CALCIUM SERPL-MCNC: 8.6 MG/DL (ref 8.3–10.1)
CALCIUM SERPL-MCNC: 8.7 MG/DL (ref 8.3–10.1)
CHLORIDE SERPL-SCNC: 79 MMOL/L (ref 96–108)
CHLORIDE SERPL-SCNC: 81 MMOL/L (ref 96–108)
CHLORIDE SERPL-SCNC: 82 MMOL/L (ref 96–108)
CO2 SERPL-SCNC: 42 MMOL/L (ref 21–32)
CO2 SERPL-SCNC: 42 MMOL/L (ref 21–32)
CO2 SERPL-SCNC: 44 MMOL/L (ref 21–32)
CREAT SERPL-MCNC: 0.67 MG/DL (ref 0.6–1.3)
CREAT SERPL-MCNC: 0.81 MG/DL (ref 0.6–1.3)
CREAT SERPL-MCNC: 0.87 MG/DL (ref 0.6–1.3)
EOSINOPHIL # BLD AUTO: 0 THOUSAND/ÂΜL (ref 0–0.61)
EOSINOPHIL NFR BLD AUTO: 0 % (ref 0–6)
ERYTHROCYTE [DISTWIDTH] IN BLOOD BY AUTOMATED COUNT: 12.3 % (ref 11.6–15.1)
GFR SERPL CREATININE-BSD FRML MDRD: 100 ML/MIN/1.73SQ M
GFR SERPL CREATININE-BSD FRML MDRD: 89 ML/MIN/1.73SQ M
GFR SERPL CREATININE-BSD FRML MDRD: 92 ML/MIN/1.73SQ M
GLUCOSE SERPL-MCNC: 161 MG/DL (ref 65–140)
GLUCOSE SERPL-MCNC: 179 MG/DL (ref 65–140)
GLUCOSE SERPL-MCNC: 241 MG/DL (ref 65–140)
HCT VFR BLD AUTO: 32.8 % (ref 36.5–49.3)
HGB BLD-MCNC: 11 G/DL (ref 12–17)
IMM GRANULOCYTES # BLD AUTO: 0.02 THOUSAND/UL (ref 0–0.2)
IMM GRANULOCYTES NFR BLD AUTO: 0 % (ref 0–2)
LYMPHOCYTES # BLD AUTO: 0.3 THOUSANDS/ÂΜL (ref 0.6–4.47)
LYMPHOCYTES NFR BLD AUTO: 6 % (ref 14–44)
MAGNESIUM SERPL-MCNC: 1.8 MG/DL (ref 1.6–2.6)
MCH RBC QN AUTO: 31.5 PG (ref 26.8–34.3)
MCHC RBC AUTO-ENTMCNC: 33.5 G/DL (ref 31.4–37.4)
MCV RBC AUTO: 94 FL (ref 82–98)
MONOCYTES # BLD AUTO: 0.45 THOUSAND/ÂΜL (ref 0.17–1.22)
MONOCYTES NFR BLD AUTO: 9 % (ref 4–12)
NEUTROPHILS # BLD AUTO: 4.42 THOUSANDS/ÂΜL (ref 1.85–7.62)
NEUTS SEG NFR BLD AUTO: 85 % (ref 43–75)
NRBC BLD AUTO-RTO: 0 /100 WBCS
PHOSPHATE SERPL-MCNC: 3 MG/DL (ref 2.3–4.1)
PLATELET # BLD AUTO: 186 THOUSANDS/UL (ref 149–390)
PMV BLD AUTO: 9.6 FL (ref 8.9–12.7)
POTASSIUM SERPL-SCNC: 3.3 MMOL/L (ref 3.5–5.3)
POTASSIUM SERPL-SCNC: 3.8 MMOL/L (ref 3.5–5.3)
POTASSIUM SERPL-SCNC: 4.2 MMOL/L (ref 3.5–5.3)
RBC # BLD AUTO: 3.49 MILLION/UL (ref 3.88–5.62)
SODIUM SERPL-SCNC: 123 MMOL/L (ref 135–147)
SODIUM SERPL-SCNC: 123 MMOL/L (ref 135–147)
SODIUM SERPL-SCNC: 127 MMOL/L (ref 135–147)
WBC # BLD AUTO: 5.19 THOUSAND/UL (ref 4.31–10.16)

## 2023-06-04 PROCEDURE — 83735 ASSAY OF MAGNESIUM: CPT | Performed by: STUDENT IN AN ORGANIZED HEALTH CARE EDUCATION/TRAINING PROGRAM

## 2023-06-04 PROCEDURE — 94760 N-INVAS EAR/PLS OXIMETRY 1: CPT

## 2023-06-04 PROCEDURE — 80048 BASIC METABOLIC PNL TOTAL CA: CPT

## 2023-06-04 PROCEDURE — 94640 AIRWAY INHALATION TREATMENT: CPT

## 2023-06-04 PROCEDURE — NC001 PR NO CHARGE: Performed by: INTERNAL MEDICINE

## 2023-06-04 PROCEDURE — 85025 COMPLETE CBC W/AUTO DIFF WBC: CPT | Performed by: STUDENT IN AN ORGANIZED HEALTH CARE EDUCATION/TRAINING PROGRAM

## 2023-06-04 PROCEDURE — 84100 ASSAY OF PHOSPHORUS: CPT | Performed by: STUDENT IN AN ORGANIZED HEALTH CARE EDUCATION/TRAINING PROGRAM

## 2023-06-04 PROCEDURE — 94003 VENT MGMT INPAT SUBQ DAY: CPT

## 2023-06-04 PROCEDURE — 99233 SBSQ HOSP IP/OBS HIGH 50: CPT | Performed by: INTERNAL MEDICINE

## 2023-06-04 RX ORDER — METHYLPREDNISOLONE SODIUM SUCCINATE 40 MG/ML
40 INJECTION, POWDER, LYOPHILIZED, FOR SOLUTION INTRAMUSCULAR; INTRAVENOUS EVERY 12 HOURS SCHEDULED
Status: DISCONTINUED | OUTPATIENT
Start: 2023-06-04 | End: 2023-06-08

## 2023-06-04 RX ORDER — POTASSIUM CHLORIDE 20 MEQ/1
40 TABLET, EXTENDED RELEASE ORAL ONCE
Status: COMPLETED | OUTPATIENT
Start: 2023-06-04 | End: 2023-06-04

## 2023-06-04 RX ORDER — VANCOMYCIN HYDROCHLORIDE 500 MG/100ML
500 INJECTION, SOLUTION INTRAVENOUS EVERY 12 HOURS
Status: DISCONTINUED | OUTPATIENT
Start: 2023-06-04 | End: 2023-06-05

## 2023-06-04 RX ADMIN — ENOXAPARIN SODIUM 40 MG: 40 INJECTION SUBCUTANEOUS at 08:48

## 2023-06-04 RX ADMIN — LEVALBUTEROL HYDROCHLORIDE 1.25 MG: 1.25 SOLUTION RESPIRATORY (INHALATION) at 13:13

## 2023-06-04 RX ADMIN — VANCOMYCIN HYDROCHLORIDE 500 MG: 500 INJECTION, SOLUTION INTRAVENOUS at 07:50

## 2023-06-04 RX ADMIN — MELATONIN 3 MG: at 22:04

## 2023-06-04 RX ADMIN — CEFEPIME 2000 MG: 2 INJECTION, POWDER, FOR SOLUTION INTRAVENOUS at 12:41

## 2023-06-04 RX ADMIN — DOCUSATE SODIUM 100 MG: 100 CAPSULE, LIQUID FILLED ORAL at 18:38

## 2023-06-04 RX ADMIN — LEVALBUTEROL HYDROCHLORIDE 1.25 MG: 1.25 SOLUTION RESPIRATORY (INHALATION) at 07:26

## 2023-06-04 RX ADMIN — METHYLPREDNISOLONE SODIUM SUCCINATE 40 MG: 40 INJECTION, POWDER, FOR SOLUTION INTRAMUSCULAR; INTRAVENOUS at 22:03

## 2023-06-04 RX ADMIN — LEVALBUTEROL HYDROCHLORIDE 1.25 MG: 1.25 SOLUTION RESPIRATORY (INHALATION) at 19:48

## 2023-06-04 RX ADMIN — SALINE NASAL SPRAY 1 SPRAY: 1.5 SOLUTION NASAL at 06:54

## 2023-06-04 RX ADMIN — IPRATROPIUM BROMIDE 0.5 MG: 0.5 SOLUTION RESPIRATORY (INHALATION) at 13:13

## 2023-06-04 RX ADMIN — CHLORHEXIDINE GLUCONATE 15 ML: 1.2 SOLUTION ORAL at 22:03

## 2023-06-04 RX ADMIN — CEFEPIME 2000 MG: 2 INJECTION, POWDER, FOR SOLUTION INTRAVENOUS at 22:11

## 2023-06-04 RX ADMIN — GUAIFENESIN 1200 MG: 600 TABLET, EXTENDED RELEASE ORAL at 22:04

## 2023-06-04 RX ADMIN — IPRATROPIUM BROMIDE 0.5 MG: 0.5 SOLUTION RESPIRATORY (INHALATION) at 07:26

## 2023-06-04 RX ADMIN — GUAIFENESIN 1200 MG: 600 TABLET, EXTENDED RELEASE ORAL at 08:49

## 2023-06-04 RX ADMIN — DOCUSATE SODIUM 100 MG: 100 CAPSULE, LIQUID FILLED ORAL at 08:48

## 2023-06-04 RX ADMIN — CHLORHEXIDINE GLUCONATE 15 ML: 1.2 SOLUTION ORAL at 08:48

## 2023-06-04 RX ADMIN — FORMOTEROL FUMARATE DIHYDRATE 20 MCG: 20 SOLUTION RESPIRATORY (INHALATION) at 19:48

## 2023-06-04 RX ADMIN — VANCOMYCIN HYDROCHLORIDE 500 MG: 500 INJECTION, SOLUTION INTRAVENOUS at 19:43

## 2023-06-04 RX ADMIN — CEFEPIME 2000 MG: 2 INJECTION, POWDER, FOR SOLUTION INTRAVENOUS at 06:47

## 2023-06-04 RX ADMIN — POTASSIUM CHLORIDE 40 MEQ: 1500 TABLET, EXTENDED RELEASE ORAL at 08:49

## 2023-06-04 RX ADMIN — ASPIRIN 81 MG: 81 TABLET, COATED ORAL at 08:48

## 2023-06-04 RX ADMIN — IPRATROPIUM BROMIDE 0.5 MG: 0.5 SOLUTION RESPIRATORY (INHALATION) at 19:48

## 2023-06-04 RX ADMIN — DOXYCYCLINE 100 MG: 100 CAPSULE ORAL at 08:48

## 2023-06-04 RX ADMIN — ATORVASTATIN CALCIUM 40 MG: 40 TABLET, FILM COATED ORAL at 16:12

## 2023-06-04 RX ADMIN — BUDESONIDE 0.5 MG: 0.5 INHALANT ORAL at 07:26

## 2023-06-04 RX ADMIN — DOXYCYCLINE 100 MG: 100 CAPSULE ORAL at 22:04

## 2023-06-04 RX ADMIN — TORSEMIDE 20 MG: 20 TABLET ORAL at 08:49

## 2023-06-04 RX ADMIN — FORMOTEROL FUMARATE DIHYDRATE 20 MCG: 20 SOLUTION RESPIRATORY (INHALATION) at 07:26

## 2023-06-04 RX ADMIN — METHYLPREDNISOLONE SODIUM SUCCINATE 40 MG: 40 INJECTION, POWDER, FOR SOLUTION INTRAMUSCULAR; INTRAVENOUS at 06:56

## 2023-06-04 RX ADMIN — TAMSULOSIN HYDROCHLORIDE 0.4 MG: 0.4 CAPSULE ORAL at 16:12

## 2023-06-04 NOTE — ASSESSMENT & PLAN NOTE
· DEXA scan on 2/11/2019 showed osteoporosis  · Not on any medications  Previously on bisphosphonate  · Follow-up outpatient with PCP

## 2023-06-04 NOTE — PROGRESS NOTES
Critical Care Interval Transfer Note:    Please refer to progress note from earlier today for full details  Barriers to discharge:   · Respiratory status requiring BiPAP and high flow     Consults: IP CONSULT TO PALLIATIVE CARE  IP CONSULT TO CASE MANAGEMENT  IP CONSULT TO PHARMACY    Recommended to review admission imaging for incidental findings and document in discharge navigator: Chart reviewed, no known incidental findings noted at this time  Discharge Plan: Anticipate discharge in 48-72 hrs to prior assisted or independent living facility  PT Recommendations: Post acute rehabilitation services  OT Recommendations: Home with home health rehabilitation      Patient seen and evaluated by Critical Care today and deemed to be appropriate for transfer to Stepdown Level 2  Spoke to Dr Jyoti Sorensen from [address] [place] [address] to accept transfer  Critical care can be contacted via [employer] with any questions or concerns

## 2023-06-04 NOTE — ASSESSMENT & PLAN NOTE
· History of chronic anemia  · No active sign of bleeding  · Trend CBC  · B12 at home  Resume as needed  · Transfuse if hemoglobin less than 7

## 2023-06-04 NOTE — ASSESSMENT & PLAN NOTE
· History of severe COPD with multiple admissions for COPD exacerbation  · PFT on 9/16/2020 showed FEV1/FVC 30%, FVC 59% predicted and FEV1 22% predicted  · Presented with respiratory distress requiring BiPAP  · Currently on high flow (FiO2 50%) at daytime and BiPAP at night  · Continue Pulmicort, formoterol, Atrovent, Xopenex  · Continue Solu-Medrol 40 mg every 12 hours  Adjust as appropriate  · Monitor daily vitals    · Pulmonology to follow

## 2023-06-04 NOTE — PROGRESS NOTES
[facility]  Progress Note: Critical Care  Name: Yvette Gaspar  79 y o  male I MRN: [id number]  Unit/Bed#: ICCU 205-01 I Date of Admission: 6/1/2023   Date of Service: 6/4/2023 I Hospital Day: 3    Assessment/Plan   Neuro:   • Diagnosis: Acute metabolic metabolic encephalopathy - resolved   - CAM-ICU  - Delirium precautions  - Treatment of underlying metabolic derangement  • Diagnosis: Analgesia  ? Plan:  ? Tylenol PRN        CV:   • Diagnosis: Acute on chronic HFrEF  ? Per outpatient HF note on morning of admission, current diuretic is torsemide 20 mg daily  ? Last echo 5/1/23: EF 20%, severe global hypokinesis, RVSP 30 00  ?   ? Plan:   - S/p 2 doses of IV Lasix 40 mg  - Continue home torsemide 20 mg daily  - Daily weight and strict ins and outs  • Diagnosis: Nonobstructive CAD  ? Plan:  - Cont ASA, statin     • Diagnosis: HLD  ? Plan:  - Atorvastatin 40 mg qd        Pulm:  • Diagnosis: Acute on chronic hypercapnic respiratory failure, chronically on 2L and CPAP at night  ? Suspect heart failure exacerbation most likely, also possibly COPD exacerbation and less likely pneumonia  ? BIPAP overnight  ? Given 1 dose IV ceftriaxone in ED   ? Plan:  - HFNC during day, BiPAP at night  - HFNC: 50 L FiO2 40%  - BiPAP: 22/10, FiO2 30%, RR 14  - S/p 2 doses of IV Lasix 40 mg   - Continue home torsemide 20 mg daily  • Started vancomycin+cefepime+doxycycline per DRIP score (5) on 6/3  - Continue Xopenex (levalbuterol), atrovent (ipratropium), Perforomist (formoterol), Pulmicort (budesonide), mucinex  • On Solu-Medrol 40 mg TID, wean as appropriate     • Diagnosis: COPD  ? Plan:  - Plan as above     • Diagnosis: KEVIN on CPAP  ? Plan:  - BiPAP at night           GI:   • Diagnosis: GERD  ? Plan:  - Not on PPI/H2RA at home  • Diagnosis: Bowel regimen  ? Plan:  ? Colace        :   • Diagnosis: Prostate CA s/p TURP  ?  Plan:  - Continue Flomax 0 4 mg daily  • Diagnosis: Hyponatremia  ? Na 121 on admission  ? Suspect due to CHF as patient is hypervolemic  ? Na stable at 123  ? Plan:  - Diurese as above  - Continue to trend Na        F/E/N:   ? F- none  ? E: K>4, Mg>2, Phos>3  - Na stable at 125 since 1pm 6/2  ? N: Regular house, sodium restricted 2 GM        Heme/Onc:   • Diagnosis: Anemia  ? Baseline 8-9  No active sign of bleeding  ? Plan:  - Continue to trend  - Transfuse Hgb <7        Endo:   • Diagnosis: Osteoporosis  ? Plan:  - Was on bisphosphonate in the past, does not appear to be on them now        ID:   • Diagnosis: Low suspicion for pneumonia  ? Procal 0 22 6/3, down from 0 27 6/2  ? Plan:  - Started vancomycin+cefepime+doxycycline per DRIP score on 6/3  Consider adjusting as appropriate given down trending procalcitonin and clinical picture  - Monitor daily vitals, WBC and fever curve  MSK/Skin:   • Diagnosis: Ambulatory dysfunction  ? Chronically uses a walker and sometimes a wheelchair  ? Wife reports he needed wheelchair this morning for increased difficulty ambulating  ? Plan:  - Frequent repositioning  ? PT/OT when appropriate    Disposition: Critical care    ICU Core Measures     A: Assess, Prevent, and Manage Pain · Has pain been assessed? Yes  · Need for changes to pain regimen? No   B: Both SAT/SAT  · N/A   C: Choice of Sedation · RASS Goal: 0 Alert and Calm  · Need for changes to sedation or analgesia regimen? No   D: Delirium · CAM-ICU: Negative   E: Early Mobility  · Plan for early mobility? Yes   F: Family Engagement · Plan for family engagement today? No       Antibiotic Review: Continue broad spectrum secondary to severity of illness  Prophylaxis:  VTE VTE covered by:  enoxaparin, Subcutaneous, 40 mg at 06/03/23 0324       Stress Ulcer  not ordered          Subjective   HPI/24hr events: In past 24 hours, patient remained on BiPAP throughout the night and high flow at daytime  No overnight events    Patient seen and examined at bedside  He states other than his breathing he feels okay and does not have any other complaints  Denies any chest pain, fever, chills, cough, abdominal pain, nausea, vomiting  Review of Systems   Constitutional: Negative for chills and fever  HENT: Negative for congestion and sore throat  Respiratory: Positive for shortness of breath  Negative for cough and chest tightness  Cardiovascular: Negative for chest pain and leg swelling  Gastrointestinal: Negative for abdominal pain, nausea and vomiting  Musculoskeletal: Negative for arthralgias  Neurological: Negative for dizziness and light-headedness  All other systems reviewed and are negative  Objective                            Vitals I/O      Most Recent Min/Max in 24hrs   Temp 97 8 °F (36 6 °C) Temp  Min: 97 5 °F (36 4 °C)  Max: 98 1 °F (36 7 °C)   Pulse 86 Pulse  Min: 84  Max: 98   Resp 22 Resp  Min: 22  Max: 24   /76 BP  Min: 100/68  Max: 131/85   O2 Sat 96 % SpO2  Min: 91 %  Max: 97 %      Intake/Output Summary (Last 24 hours) at 6/4/2023 0707  Last data filed at 6/4/2023 1643  Gross per 24 hour   Intake 420 ml   Output 1908 ml   Net -1488 ml         Diet Regular; Regular House     Invasive Monitoring Physical exam    Physical Exam  Vitals and nursing note reviewed  Constitutional:       Appearance: He is normal weight  He is ill-appearing  HENT:      Head: Normocephalic  Cardiovascular:      Rate and Rhythm: Normal rate and regular rhythm  Pulses: Normal pulses  Heart sounds: Normal heart sounds  Pulmonary:      Effort: Respiratory distress present  Abdominal:      General: Bowel sounds are normal       Palpations: Abdomen is soft  Neurological:      General: No focal deficit present  Mental Status: He is alert and oriented to person, place, and time  Diagnostic Studies      EKG:   Imaging:  I have personally reviewed pertinent reports         Medications:  Scheduled PRN   aspirin, 81 mg, Daily  atorvastatin, 40 mg, Daily With Dinner  budesonide, 0 5 mg, Daily  cefepime, 2,000 mg, Q8H  chlorhexidine, 15 mL, Q12H GABE  docusate sodium, 100 mg, BID  doxycycline hyclate, 100 mg, Q12H GABE  enoxaparin, 40 mg, Daily  formoterol, 20 mcg, Q12H  guaiFENesin, 1,200 mg, Q12H GABE  ipratropium, 0 5 mg, TID  levalbuterol, 1 25 mg, TID  melatonin, 3 mg, HS  methylPREDNISolone sodium succinate, 40 mg, Q8H GABE  tamsulosin, 0 4 mg, Daily With Dinner  torsemide, 20 mg, Daily  vancomycin, 15 mg/kg, Q12H      acetaminophen, 975 mg, Q8H PRN  sodium chloride, 1 spray, Q1H PRN       Continuous          Labs:    CBC    Recent Labs     06/03/23  0534 06/04/23  0454   HCT 33 9* 32 8*   HGB 11 3* 11 0*    186   WBC 3 07* 5 19     BMP    Recent Labs     06/03/23  1703 06/04/23  0006   AGAP 0* -1*   BUN 24 26*   CALCIUM 9 1 8 5   CL 82* 82*   CO2 42* 42*   CREATININE 0 63 0 67   K 3 5 3 8   SODIUM 124* 123*       Coags    No recent results     Additional Electrolytes  Recent Labs     06/02/23  1340 06/02/23  1409 06/04/23  0454   CAIONIZED  --  0 99*  --    MG 2 0  --  1 8   PHOS 3 6  --  3 0          Blood Gas    No recent results  No recent results LFTs  No recent results    Infectious  Recent Labs     06/03/23  0534   PROCALCITONI 0 22     Glucose  Recent Labs     06/03/23  0534 06/03/23  1111 06/03/23  1703 06/04/23  0006   GLUC 132 222* 203* 179*               Critical Care Time Delivered: Upon my evaluation, this patient had a high probability of imminent or life-threatening deterioration due to BiPap, which required my direct attention, intervention, and personal management  I have personally provided 30 minutes of critical care time, exclusive of procedures, teaching, family meetings, and any prior time recorded by providers other than myself       Juan Carlos Durham, DO

## 2023-06-04 NOTE — PROGRESS NOTES
Parminder Patton  is a 79 y o  male who is currently ordered Vancomycin IV with management by the Pharmacy Consult service  Relevant clinical data and objective / subjective history reviewed  Vancomycin Assessment:   Indication and Goal AUC/Trough: Pneumonia (goal -600, trough >10), -600, trough >10  Clinical Status: stable but remains critically ill  Micro: pending  4  Renal Function:  SCr: 0 67 mg/dL   CrCl: 60 4 mL/min   Renal replacement: Not on dialysis  5  Days of Therapy: 3  6  Current Dose: 750 mg IV q12h    Vancomycin Plan:   New Dosing: change to 500 mg IV Q12H  Estimated AUC: 425 mcg*hr/mL  Estimated Trough: 13 3 mcg/mL  Next Level: 6/9 with AM labs  Renal Function Monitoring: Daily BMP and [address] will continue to follow closely for s/sx of nephrotoxicity, infusion reactions and appropriateness of therapy  BMP and CBC will be ordered per protocol  We will continue to follow the patient’s culture results and clinical progress daily

## 2023-06-04 NOTE — PHYSICAL THERAPY NOTE
Physical Therapy Cancellation Note               06/04/23 1054   PT Last Visit   PT Visit Date 06/04/23   Note Type   Note type Evaluation   Additional Comments Chart reviewed; PT is not appropriate at this time due to his respiratory status   PT to see when appropriate           Jesus Manuel San PT, DPT Prescription(s) eprescribed to pharmacy.

## 2023-06-04 NOTE — ASSESSMENT & PLAN NOTE
Wt Readings from Last 3 Encounters:   06/04/23 45 9 kg (101 lb 4 8 oz)   06/01/23 46 7 kg (103 lb)   05/19/23 46 8 kg (103 lb 2 8 oz)     · Echo on 5/1/2023 - LVEF 20%, severe global hypokinesis, abnormal mildly septal motion consistent with left bundle branch block and diastolic flattening of interventricular septum consistent with right ventricule volume overload  ·   · S/p 2 doses of IV Lasix 40 mg  · Continue home torsemide 20 mg daily  · Daily weights and strict ins and outs

## 2023-06-05 ENCOUNTER — APPOINTMENT (INPATIENT)
Dept: RADIOLOGY | Facility: HOSPITAL | Age: 68
DRG: 193 | End: 2023-06-05
Payer: COMMERCIAL

## 2023-06-05 PROBLEM — J18.9 PNEUMONIA: Status: ACTIVE | Noted: 2023-06-05

## 2023-06-05 LAB
ANION GAP SERPL CALCULATED.3IONS-SCNC: 1 MMOL/L (ref 4–13)
ANION GAP SERPL CALCULATED.3IONS-SCNC: 1 MMOL/L (ref 4–13)
ANION GAP SERPL CALCULATED.3IONS-SCNC: 2 MMOL/L (ref 4–13)
BASOPHILS # BLD AUTO: 0.01 THOUSANDS/ÂΜL (ref 0–0.1)
BASOPHILS NFR BLD AUTO: 0 % (ref 0–1)
BUN SERPL-MCNC: 23 MG/DL (ref 5–25)
BUN SERPL-MCNC: 24 MG/DL (ref 5–25)
BUN SERPL-MCNC: 25 MG/DL (ref 5–25)
CALCIUM SERPL-MCNC: 8.1 MG/DL (ref 8.3–10.1)
CALCIUM SERPL-MCNC: 8.4 MG/DL (ref 8.3–10.1)
CALCIUM SERPL-MCNC: 8.6 MG/DL (ref 8.3–10.1)
CHLORIDE SERPL-SCNC: 80 MMOL/L (ref 96–108)
CHLORIDE SERPL-SCNC: 82 MMOL/L (ref 96–108)
CHLORIDE SERPL-SCNC: 83 MMOL/L (ref 96–108)
CO2 SERPL-SCNC: 42 MMOL/L (ref 21–32)
CO2 SERPL-SCNC: 42 MMOL/L (ref 21–32)
CO2 SERPL-SCNC: 43 MMOL/L (ref 21–32)
CREAT SERPL-MCNC: 0.74 MG/DL (ref 0.6–1.3)
CREAT SERPL-MCNC: 0.77 MG/DL (ref 0.6–1.3)
CREAT SERPL-MCNC: 0.86 MG/DL (ref 0.6–1.3)
EOSINOPHIL # BLD AUTO: 0 THOUSAND/ÂΜL (ref 0–0.61)
EOSINOPHIL NFR BLD AUTO: 0 % (ref 0–6)
ERYTHROCYTE [DISTWIDTH] IN BLOOD BY AUTOMATED COUNT: 12.4 % (ref 11.6–15.1)
GFR SERPL CREATININE-BSD FRML MDRD: 90 ML/MIN/1.73SQ M
GFR SERPL CREATININE-BSD FRML MDRD: 94 ML/MIN/1.73SQ M
GFR SERPL CREATININE-BSD FRML MDRD: 96 ML/MIN/1.73SQ M
GLUCOSE SERPL-MCNC: 148 MG/DL (ref 65–140)
GLUCOSE SERPL-MCNC: 160 MG/DL (ref 65–140)
GLUCOSE SERPL-MCNC: 180 MG/DL (ref 65–140)
HCT VFR BLD AUTO: 32.5 % (ref 36.5–49.3)
HGB BLD-MCNC: 10.8 G/DL (ref 12–17)
IMM GRANULOCYTES # BLD AUTO: 0.02 THOUSAND/UL (ref 0–0.2)
IMM GRANULOCYTES NFR BLD AUTO: 0 % (ref 0–2)
LYMPHOCYTES # BLD AUTO: 0.35 THOUSANDS/ÂΜL (ref 0.6–4.47)
LYMPHOCYTES NFR BLD AUTO: 4 % (ref 14–44)
MAGNESIUM SERPL-MCNC: 1.6 MG/DL (ref 1.6–2.6)
MCH RBC QN AUTO: 31.6 PG (ref 26.8–34.3)
MCHC RBC AUTO-ENTMCNC: 33.2 G/DL (ref 31.4–37.4)
MCV RBC AUTO: 95 FL (ref 82–98)
MONOCYTES # BLD AUTO: 0.57 THOUSAND/ÂΜL (ref 0.17–1.22)
MONOCYTES NFR BLD AUTO: 7 % (ref 4–12)
NEUTROPHILS # BLD AUTO: 7.2 THOUSANDS/ÂΜL (ref 1.85–7.62)
NEUTS SEG NFR BLD AUTO: 89 % (ref 43–75)
NRBC BLD AUTO-RTO: 0 /100 WBCS
PHOSPHATE SERPL-MCNC: 2.9 MG/DL (ref 2.3–4.1)
PLATELET # BLD AUTO: 175 THOUSANDS/UL (ref 149–390)
PMV BLD AUTO: 9.6 FL (ref 8.9–12.7)
POTASSIUM SERPL-SCNC: 3.8 MMOL/L (ref 3.5–5.3)
POTASSIUM SERPL-SCNC: 3.8 MMOL/L (ref 3.5–5.3)
POTASSIUM SERPL-SCNC: 3.9 MMOL/L (ref 3.5–5.3)
RBC # BLD AUTO: 3.42 MILLION/UL (ref 3.88–5.62)
SODIUM SERPL-SCNC: 125 MMOL/L (ref 135–147)
SODIUM SERPL-SCNC: 125 MMOL/L (ref 135–147)
SODIUM SERPL-SCNC: 126 MMOL/L (ref 135–147)
WBC # BLD AUTO: 8.15 THOUSAND/UL (ref 4.31–10.16)

## 2023-06-05 PROCEDURE — 94760 N-INVAS EAR/PLS OXIMETRY 1: CPT

## 2023-06-05 PROCEDURE — 83735 ASSAY OF MAGNESIUM: CPT | Performed by: STUDENT IN AN ORGANIZED HEALTH CARE EDUCATION/TRAINING PROGRAM

## 2023-06-05 PROCEDURE — 97166 OT EVAL MOD COMPLEX 45 MIN: CPT

## 2023-06-05 PROCEDURE — 94003 VENT MGMT INPAT SUBQ DAY: CPT

## 2023-06-05 PROCEDURE — 99232 SBSQ HOSP IP/OBS MODERATE 35: CPT | Performed by: PHYSICIAN ASSISTANT

## 2023-06-05 PROCEDURE — 94640 AIRWAY INHALATION TREATMENT: CPT

## 2023-06-05 PROCEDURE — 84100 ASSAY OF PHOSPHORUS: CPT | Performed by: STUDENT IN AN ORGANIZED HEALTH CARE EDUCATION/TRAINING PROGRAM

## 2023-06-05 PROCEDURE — 94664 DEMO&/EVAL PT USE INHALER: CPT

## 2023-06-05 PROCEDURE — 80048 BASIC METABOLIC PNL TOTAL CA: CPT

## 2023-06-05 PROCEDURE — 99232 SBSQ HOSP IP/OBS MODERATE 35: CPT | Performed by: INTERNAL MEDICINE

## 2023-06-05 PROCEDURE — 99222 1ST HOSP IP/OBS MODERATE 55: CPT | Performed by: INTERNAL MEDICINE

## 2023-06-05 PROCEDURE — 85025 COMPLETE CBC W/AUTO DIFF WBC: CPT | Performed by: STUDENT IN AN ORGANIZED HEALTH CARE EDUCATION/TRAINING PROGRAM

## 2023-06-05 PROCEDURE — 97163 PT EVAL HIGH COMPLEX 45 MIN: CPT

## 2023-06-05 PROCEDURE — 73630 X-RAY EXAM OF FOOT: CPT

## 2023-06-05 RX ADMIN — METHYLPREDNISOLONE SODIUM SUCCINATE 40 MG: 40 INJECTION, POWDER, FOR SOLUTION INTRAMUSCULAR; INTRAVENOUS at 08:26

## 2023-06-05 RX ADMIN — VANCOMYCIN HYDROCHLORIDE 500 MG: 500 INJECTION, SOLUTION INTRAVENOUS at 08:22

## 2023-06-05 RX ADMIN — MELATONIN 3 MG: at 21:21

## 2023-06-05 RX ADMIN — GUAIFENESIN 1200 MG: 600 TABLET, EXTENDED RELEASE ORAL at 08:26

## 2023-06-05 RX ADMIN — METHYLPREDNISOLONE SODIUM SUCCINATE 40 MG: 40 INJECTION, POWDER, FOR SOLUTION INTRAMUSCULAR; INTRAVENOUS at 21:21

## 2023-06-05 RX ADMIN — ATORVASTATIN CALCIUM 40 MG: 40 TABLET, FILM COATED ORAL at 16:12

## 2023-06-05 RX ADMIN — CHLORHEXIDINE GLUCONATE 15 ML: 1.2 SOLUTION ORAL at 08:26

## 2023-06-05 RX ADMIN — DOXYCYCLINE 100 MG: 100 CAPSULE ORAL at 21:21

## 2023-06-05 RX ADMIN — FORMOTEROL FUMARATE DIHYDRATE 20 MCG: 20 SOLUTION RESPIRATORY (INHALATION) at 20:30

## 2023-06-05 RX ADMIN — LEVALBUTEROL HYDROCHLORIDE 1.25 MG: 1.25 SOLUTION RESPIRATORY (INHALATION) at 20:30

## 2023-06-05 RX ADMIN — IPRATROPIUM BROMIDE 0.5 MG: 0.5 SOLUTION RESPIRATORY (INHALATION) at 14:22

## 2023-06-05 RX ADMIN — CEFEPIME 2000 MG: 2 INJECTION, POWDER, FOR SOLUTION INTRAVENOUS at 05:38

## 2023-06-05 RX ADMIN — IPRATROPIUM BROMIDE 0.5 MG: 0.5 SOLUTION RESPIRATORY (INHALATION) at 07:19

## 2023-06-05 RX ADMIN — ASPIRIN 81 MG: 81 TABLET, COATED ORAL at 08:26

## 2023-06-05 RX ADMIN — LEVALBUTEROL HYDROCHLORIDE 1.25 MG: 1.25 SOLUTION RESPIRATORY (INHALATION) at 07:19

## 2023-06-05 RX ADMIN — DOCUSATE SODIUM 100 MG: 100 CAPSULE, LIQUID FILLED ORAL at 18:46

## 2023-06-05 RX ADMIN — BUDESONIDE 0.5 MG: 0.5 INHALANT ORAL at 07:19

## 2023-06-05 RX ADMIN — IPRATROPIUM BROMIDE 0.5 MG: 0.5 SOLUTION RESPIRATORY (INHALATION) at 20:30

## 2023-06-05 RX ADMIN — LEVALBUTEROL HYDROCHLORIDE 1.25 MG: 1.25 SOLUTION RESPIRATORY (INHALATION) at 14:22

## 2023-06-05 RX ADMIN — CHLORHEXIDINE GLUCONATE 15 ML: 1.2 SOLUTION ORAL at 21:21

## 2023-06-05 RX ADMIN — TORSEMIDE 20 MG: 20 TABLET ORAL at 08:26

## 2023-06-05 RX ADMIN — DOXYCYCLINE 100 MG: 100 CAPSULE ORAL at 08:26

## 2023-06-05 RX ADMIN — TAMSULOSIN HYDROCHLORIDE 0.4 MG: 0.4 CAPSULE ORAL at 16:11

## 2023-06-05 RX ADMIN — DOCUSATE SODIUM 100 MG: 100 CAPSULE, LIQUID FILLED ORAL at 08:26

## 2023-06-05 RX ADMIN — ACETAMINOPHEN 975 MG: 325 TABLET, FILM COATED ORAL at 08:26

## 2023-06-05 RX ADMIN — ENOXAPARIN SODIUM 40 MG: 40 INJECTION SUBCUTANEOUS at 08:26

## 2023-06-05 RX ADMIN — CEFEPIME 2000 MG: 2 INJECTION, POWDER, FOR SOLUTION INTRAVENOUS at 13:38

## 2023-06-05 RX ADMIN — GUAIFENESIN 1200 MG: 600 TABLET, EXTENDED RELEASE ORAL at 21:21

## 2023-06-05 NOTE — PROGRESS NOTES
[facility]  Progress Note  Name: Jeremy Watt  MRN: [id number]  Unit/Bed#: [facility] 205-01 I Date of Admission: 6/1/2023   Date of Service: 6/5/2023 I Hospital Day: 4    Assessment/Plan   * Acute exacerbation of congestive heart failure [facility])  Assessment & Plan  Wt Readings from Last 3 Encounters:   06/04/23 45 9 kg (101 lb 4 8 oz)   06/01/23 46 7 kg (103 lb)   05/19/23 46 8 kg (103 lb 2 8 oz)     · Echo on 5/1/2023 - LVEF 20%, severe global hypokinesis, abnormal mildly septal motion consistent with left bundle branch block and diastolic flattening of interventricular septum consistent with right ventricule volume overload  ·   · S/p 2 doses of IV Lasix 40 mg  · Continue home torsemide 20 mg daily  · Daily weights and strict ins and outs  Pneumonia  Assessment & Plan  · Vancomycin, cefepime and doxy on board for DRIP score > 5  · Continue IV abx  · Pulmonology to assist with length of abx  · Speech therapy consulted - no change in diet recommended  · Blood cultures negative to date  · MRSA culture positive nares    Hyponatremia  Assessment & Plan  · Appears to have chronic hyponatremia  · Currently 126  · Nephrology consulted for assistance    Anemia  Assessment & Plan  · History of chronic anemia  · No active sign of bleeding  · Trend CBC  · B12 at home  Resume as needed  · Transfuse if hemoglobin less than 7  Osteoporosis  Assessment & Plan  · DEXA scan on 2/11/2019 showed osteoporosis  · Not on any medications  Previously on bisphosphonate  · Follow-up outpatient with PCP  KEVIN and COPD overlap syndrome (HCC)  Assessment & Plan  · Continue BiPAP at night    Chronic obstructive pulmonary disease (HCC)  Assessment & Plan  · History of severe COPD with multiple admissions for COPD exacerbation  · PFT on 9/16/2020 showed FEV1/FVC 30%, FVC 59% predicted and FEV1 22% predicted  · Presented with respiratory distress requiring BiPAP    · Currently on high flow (FiO2 50%) at daytime and BiPAP at night  · Continue Pulmicort, formoterol, Atrovent, Xopenex  · Continue Solu-Medrol 40 mg every 12 hours  Adjust as appropriate  · Monitor daily vitals  · Pulmonology to follow           VTE Pharmacologic Prophylaxis:   Moderate Risk (Score 3-4) - Pharmacological DVT Prophylaxis Ordered: enoxaparin (Lovenox)  Patient Centered Rounds: I performed bedside rounds with nursing staff today  Discussions with Specialists or Other Care Team Provider: palliative care, pulmonology, nephrology    Education and Discussions with Family / Patient: Updated  (wife) via phone  Total Time Spent on Date of Encounter in care of patient: 45 minutes This time was spent on one or more of the following: performing physical exam; counseling and coordination of care; obtaining or reviewing history; documenting in the medical record; reviewing/ordering tests, medications or procedures; communicating with other healthcare professionals and discussing with patient's family/caregivers  Current Length of Stay: 4 day(s)  Current Patient Status: Inpatient   Certification Statement: The patient will continue to require additional inpatient hospital stay due to IV abx, hypoxia on high flow, COPD exacerbation, foot pain  Discharge Plan: Anticipate discharge in >72 hrs to discharge location to be determined pending rehab evaluations  Code Status: Level 1 - Full Code    Subjective:   Patient reports still feeling SOB  Having right foot pain - tylenol seems to help somewhat  Denies any chest pains    Objective:     Vitals:   Temp (24hrs), Av °F (36 7 °C), Min:97 7 °F (36 5 °C), Max:98 4 °F (36 9 °C)    Temp:  [97 7 °F (36 5 °C)-98 4 °F (36 9 °C)] 97 7 °F (36 5 °C)  HR:  [76-82] 80  Resp:  [18-24] 24  BP: ()/(60-75) 103/67  SpO2:  [92 %-99 %] 95 %  Body mass index is 19 8 kg/m²  Input and Output Summary (last 24 hours):      Intake/Output Summary (Last 24 hours) at 2023 [address] filed at 6/5/2023 0553  Gross per 24 hour   Intake 120 ml   Output 700 ml   Net -580 ml       Physical Exam:   Physical Exam  Vitals and nursing note reviewed  Constitutional:       Appearance: He is ill-appearing  Cardiovascular:      Rate and Rhythm: Normal rate and regular rhythm  Pulses: Normal pulses  Heart sounds: Normal heart sounds  Pulmonary:      Breath sounds: Wheezing present  Comments: Slight increase effort in breathing  Abdominal:      General: Bowel sounds are normal       Palpations: Abdomen is soft  Tenderness: There is no abdominal tenderness  There is no guarding or rebound  Musculoskeletal:      Comments: Right  to palpation, erythema and swelling at the anterior portion of the foot, no obvious wounds noted   Neurological:      General: No focal deficit present  Mental Status: He is alert  Mental status is at baseline     Psychiatric:         Mood and Affect: Mood normal          Behavior: Behavior normal           Additional Data:     Labs:  Results from last 7 days   Lab Units 06/05/23  0539   EOS PCT % 0   HEMATOCRIT % 32 5*   HEMOGLOBIN g/dL 10 8*   LYMPHS PCT % 4*   MONOS PCT % 7   NEUTROS PCT % 89*   PLATELETS Thousands/uL 175   WBC Thousand/uL 8 15     Results from last 7 days   Lab Units 06/05/23  0820   ANION GAP mmol/L 1*   BUN mg/dL 23   CALCIUM mg/dL 8 6   CHLORIDE mmol/L 83*   CO2 mmol/L 42*   CREATININE mg/dL 0 86   GLUCOSE RANDOM mg/dL 160*   POTASSIUM mmol/L 3 8   SODIUM mmol/L 126*     Results from last 7 days   Lab Units 06/01/23  1245   INR  0 84             Results from last 7 days   Lab Units 06/03/23  0534 06/02/23  0606 06/01/23  1245 06/01/23  1220   LACTIC ACID mmol/L  --   --  1 7  --    PROCALCITONIN ng/ml 0 22 0 27*  --  0 07       Lines/Drains:  Invasive Devices     Peripheral Intravenous Line  Duration           Peripheral IV 06/01/23 Right;Ventral (anterior) Forearm 3 days    Peripheral IV 06/05/23 Left;Proximal;Ventral (anterior) Forearm <1 day          Drain  Duration           External Urinary Catheter Small 3 days                      Imaging: No pertinent imaging reviewed  Recent Cultures (last 7 days):   Results from last 7 days   Lab Units 06/01/23  1245   BLOOD CULTURE  No Growth at 72 hrs  No Growth at 72 hrs  Last 24 Hours Medication List:   Current Facility-Administered Medications   Medication Dose Route Frequency Provider Last Rate   • acetaminophen  975 mg Oral Q8H PRN Dami Mcclure PA-C     • aspirin  81 mg Oral Daily Debby Herr MD     • atorvastatin  40 mg Oral Daily With Dinner Dami Mcclure PA-C     • budesonide  0 5 mg Nebulization Daily Dami Mcclure PA-C     • cefepime  2,000 mg Intravenous Q8H Dami Mcclure PA-C 2,000 mg (06/05/23 6057)   • chlorhexidine  15 mL Mouth/Throat Q12H [address] Dami Mcclure PA-C     • docusate sodium  100 mg Oral BID Dami Mcclure PA-C     • doxycycline hyclate  100 mg Oral Q12H [address] Dami Mcclure PA-C     • enoxaparin  40 mg Subcutaneous Daily Dami Mcclure PA-C     • formoterol  20 mcg Nebulization Q12H Dami Mcclure PA-C     • guaiFENesin  1,200 mg Oral Q12H [address] Dami Mcclure PA-C     • ipratropium  0 5 mg Nebulization TID Dami Mcclure PA-C     • levalbuterol  1 25 mg Nebulization TID Dami Mcclure PA-C     • melatonin  3 mg Oral HS Dami Mcclure PA-C     • methylPREDNISolone sodium succinate  40 mg Intravenous Q12H [address] Ang Bryce Lipscomb DO     • sodium chloride  1 spray Each Nare Q1H PRN Chio Pacheco MD     • tamsulosin  0 4 mg Oral Daily With Dinner Dami Mcclure PA-C     • torsemide  20 mg Oral Daily Olga Gutierrez DO     • vancomycin  500 mg Intravenous Q12H James Sweeney  mg (06/05/23 3382)        Today, Patient Was Seen By: Kelsey Canales PA-C    **Please Note: This note may have been constructed using a voice recognition system  **

## 2023-06-05 NOTE — PROGRESS NOTES
Ellen Lechuga  is a 79 y o  male who is currently ordered Vancomycin IV with management by the Pharmacy Consult service  Relevant clinical data and objective / subjective history reviewed  Vancomycin Assessment:  Indication and Goal AUC/Trough: Pneumonia (goal -600, trough >10), -600, trough >10  Clinical Status: stable  Micro:     Renal Function:  SCr: 0 74  mg/dL  CrCl: 63 8 mL/min  Renal replacement: Not on dialysis  Days of Therapy: 4  Current Dose: 500mg iv q12h  Vancomycin Plan:  New Dosing: continue current dose  Estimated AUC: 457 mcg*hr/mL  Estimated Trough: 14 6 mcg/mL  Next Level: 6/9/23 am labs  Renal Function Monitoring: Daily BMP and [place] will continue to follow closely for s/sx of nephrotoxicity, infusion reactions and appropriateness of therapy  BMP and CBC will be ordered per protocol  We will continue to follow the patient’s culture results and clinical progress daily      Анна Powers, Pharmacist

## 2023-06-05 NOTE — PLAN OF CARE
Problem: Prexisting or High Potential for Compromised Skin Integrity  Goal: Skin integrity is maintained or improved  Description: INTERVENTIONS:  - Identify patients at risk for skin breakdown  - Assess and monitor skin integrity  - Assess and monitor nutrition and hydration status  - Monitor labs   - Assess for incontinence   - Turn and reposition patient  - Assist with mobility/ambulation  - Relieve pressure over bony prominences  - Avoid friction and shearing  - Provide appropriate hygiene as needed including keeping skin clean and dry  - Evaluate need for skin moisturizer/barrier cream  - Collaborate with interdisciplinary team   - Patient/family teaching  - Consider wound care consult   6/5/2023 0055 by Yasmani Barnett  Outcome: Progressing  6/5/2023 0055 by Yasmani Barnett  Outcome: Progressing     Problem: RESPIRATORY - ADULT  Goal: Achieves optimal ventilation and oxygenation  Description: INTERVENTIONS:  - Assess for changes in respiratory status  - Assess for changes in mentation and behavior  - Position to facilitate oxygenation and minimize respiratory effort  - Oxygen administered by appropriate delivery if ordered  - Initiate smoking cessation education as indicated  - Encourage broncho-pulmonary hygiene including cough, deep breathe, Incentive Spirometry  - Assess the need for suctioning and aspirate as needed  - Assess and instruct to report SOB or any respiratory difficulty  - Respiratory Therapy support as indicated  Outcome: Progressing

## 2023-06-05 NOTE — PLAN OF CARE
Problem: MOBILITY - ADULT  Goal: Maintain or return to baseline ADL function  Description: INTERVENTIONS:  -  Assess patient's ability to carry out ADLs; assess patient's baseline for ADL function and identify physical deficits which impact ability to perform ADLs (bathing, care of mouth/teeth, toileting, grooming, dressing, etc )  - Assess/evaluate cause of self-care deficits   - Assess range of motion  - Assess patient's mobility; develop plan if impaired  - Assess patient's need for assistive devices and provide as appropriate  - Encourage maximum independence but intervene and supervise when necessary  - Involve family in performance of ADLs  - Assess for home care needs following discharge   - Consider OT consult to assist with ADL evaluation and planning for discharge  - Provide patient education as appropriate  Outcome: Progressing  Goal: Maintains/Returns to pre admission functional level  Description: INTERVENTIONS:  - Perform BMAT or MOVE assessment daily    - Set and communicate daily mobility goal to care team and patient/family/caregiver  - Collaborate with rehabilitation services on mobility goals if consulted  - Perform Range of Motion   times a day  - Reposition patient every   hours    - Dangle patient   times a day  - Stand patient   times a day  - Ambulate patient   times a day  - Out of bed to chair   times a day   - Out of bed for meals      times a day  - Out of bed for toileting  - Record patient progress and toleration of activity level   Outcome: Progressing     Problem: Prexisting or High Potential for Compromised Skin Integrity  Goal: Skin integrity is maintained or improved  Description: INTERVENTIONS:  - Identify patients at risk for skin breakdown  - Assess and monitor skin integrity  - Assess and monitor nutrition and hydration status  - Monitor labs   - Assess for incontinence   - Turn and reposition patient  - Assist with mobility/ambulation  - Relieve pressure over bony prominences  - Avoid friction and shearing  - Provide appropriate hygiene as needed including keeping skin clean and dry  - Evaluate need for skin moisturizer/barrier cream  - Collaborate with interdisciplinary team   - Patient/family teaching  - Consider wound care consult   Outcome: Progressing     Problem: RESPIRATORY - ADULT  Goal: Achieves optimal ventilation and oxygenation  Description: INTERVENTIONS:  - Assess for changes in respiratory status  - Assess for changes in mentation and behavior  - Position to facilitate oxygenation and minimize respiratory effort  - Oxygen administered by appropriate delivery if ordered  - Initiate smoking cessation education as indicated  - Encourage broncho-pulmonary hygiene including cough, deep breathe, Incentive Spirometry  - Assess the need for suctioning and aspirate as needed  - Assess and instruct to report SOB or any respiratory difficulty  - Respiratory Therapy support as indicated  Outcome: Progressing

## 2023-06-05 NOTE — PROGRESS NOTES
PULMONOLOGY PROGRESS NOTE     Name: Michael Barksdale  Age & Sex: 79 y o  male   MRN: [id number]  Unit/Bed#: [facility] 205-01   Encounter: [id number]    PATIENT INFORMATION     Name: Michael Barksdale  Age & Sex: 79 y o  male   MRN: [id number]  Hospital Stay Days: 4    ASSESSMENT/PLAN     Assessment:   1  Acute on chronic respiratory failure with hypoxia and hypercapnia  2  Acute on chronic heart failure with reduced ejection fraction  3  Acute exacerbation of COPD  4  Very severe COPD, end-stage  5  KEVIN/COPD overlap  6  Concern for pneumonia    Plan:  • Continue supplemental oxygen to maintain SPO2 greater than 88%  Continue to quires low levels of high flow nasal cannula  Could potentially consider switching to mid flow nasal cannula  • We will continue with BiPAP at night due to likely susan mitten KEVIN/COPD with severe risk for hypercapnia and respiratory failure  • Currently he is on bronchodilators with Xopenex/Atrovent as an Perforomist/Pulmicort  He additionally is on Solu-Medrol 40 mg 3 times daily  Consider de-escalation of Solu-Medrol tomorrow to 40 mg twice daily as he was not actively wheezing on examination today  • Continue with aggressive diuresis as likely his heart failure is contributing to his acute presentation  • He was started on broad-spectrum antibiotics on 6/3 due to elevated drip score  Procalcitonin has been grossly unremarkable  MRSA nares culture positive  Currently is on vancomycin/doxycycline/cefepime  Blood cultures are negative for 72 hours  Recommend discontinuation of vancomycin and cefepime  Can continue doxycycline for 5-day course for tracheobronchitis  SUBJECTIVE     Patient seen and examined  No acute events overnight  Reports persistent shortness of breath  States he has been doing this for years  Slightly improved from admission      OBJECTIVE     Vitals:    06/05/23 0505 06/05/23 0553 06/05/23 0740 06/05/23 1120   BP:   103/67 104/66   BP Location:   Left leg Pulse:   80 82   Resp:   (!) 24    Temp:   97 7 °F (36 5 °C) 97 8 °F (36 6 °C)   TempSrc:   Oral Oral   SpO2: 99%  95% 95%   Weight:  46 kg (101 lb 6 4 oz)     Height:          Temperature:   Temp (24hrs), Av °F (36 7 °C), Min:97 7 °F (36 5 °C), Max:98 4 °F (36 9 °C)    Temperature: 97 8 °F (36 6 °C)  Intake & Output:  I/O       / 0701  06/04 0700 06/04 0701  06/05 0700 06/05 0701  06/ 0700    P  O  400 120 180    I V  (mL/kg)       IV Piggyback 130      Total Intake(mL/kg) 530 (11 5) 120 (2 6) 180 (3 9)    Urine (mL/kg/hr) 1908 (1 7) 700 (0 6)     Stool  0     Total Output 1908 700     Net -1378 -580 +180           Unmeasured Urine Occurrence 1 x 3 x     Unmeasured Stool Occurrence  1 x         Weights:   IBW (Ideal Body Weight): 50 kg    Body mass index is 19 8 kg/m²  Weight (last 2 days)     Date/Time Weight    23 0553 46 (101 4)    23 0600 45 9 (101 3)    23 0551 44 8 (98 8)    23 0334 44 9 (98 99)        Physical Exam  Vitals and nursing note reviewed  Constitutional:       General: He is not in acute distress  Appearance: Normal appearance  He is well-developed  He is cachectic  He is ill-appearing  He is not toxic-appearing  Interventions: He is not intubated  HENT:      Head: Normocephalic and atraumatic  Right Ear: External ear normal       Left Ear: External ear normal       Nose: Nose normal       Mouth/Throat:      Mouth: Mucous membranes are moist       Pharynx: Oropharynx is clear  Eyes:      General: No scleral icterus  Conjunctiva/sclera: Conjunctivae normal    Cardiovascular:      Rate and Rhythm: Normal rate and regular rhythm  Pulses: Normal pulses  Heart sounds: Normal heart sounds  No murmur heard  No friction rub  No gallop  Pulmonary:      Effort: Tachypnea and accessory muscle usage present  No respiratory distress  He is not intubated  Breath sounds: Decreased air movement present   Decreased breath sounds present  No wheezing, rhonchi or rales  Abdominal:      General: Abdomen is flat  Bowel sounds are normal       Palpations: Abdomen is soft  Musculoskeletal:         General: No swelling or tenderness  Cervical back: Neck supple  No tenderness  Skin:     General: Skin is warm and dry  Neurological:      General: No focal deficit present  Mental Status: He is alert and oriented to person, place, and time  Mental status is at baseline  LABORATORY DATA     Labs: I have personally reviewed pertinent reports  Results from last 7 days   Lab Units 06/05/23  0539 06/04/23  0454 06/03/23  0534 06/02/23  0606   EOS PCT % 0 0  --  0   HEMATOCRIT % 32 5* 32 8* 33 9* 33 5*   HEMOGLOBIN g/dL 10 8* 11 0* 11 3* 10 6*   MONOS PCT % 7 9  --  22*   NEUTROS PCT % 89* 85*  --  68   PLATELETS Thousands/uL 175 186 178 155   WBC Thousand/uL 8 15 5 19 3 07* 3 96*      Results from last 7 days   Lab Units 06/05/23  0820 06/05/23  0032 06/04/23  1619   BUN mg/dL 23 24 23   CALCIUM mg/dL 8 6 8 4 8 6   CHLORIDE mmol/L 83* 82* 79*   CO2 mmol/L 42* 42* 44*   CREATININE mg/dL 0 86 0 74 0 81   POTASSIUM mmol/L 3 8 3 9 3 3*     Results from last 7 days   Lab Units 06/05/23  0539 06/04/23  0454 06/02/23  1340   MAGNESIUM mg/dL 1 6 1 8 2 0     Results from last 7 days   Lab Units 06/05/23  0539 06/04/23  0454 06/02/23  1340   PHOSPHORUS mg/dL 2 9 3 0 3 6      Results from last 7 days   Lab Units 06/01/23  1245   INR  0 84   PTT seconds 26     Results from last 7 days   Lab Units 06/01/23  1245   LACTIC ACID mmol/L 1 7             ABG:       Micro:   Results from last 7 days   Lab Units 06/02/23  1120 06/01/23  1245   BLOOD CULTURE   --  No Growth at 72 hrs  No Growth at 72 hrs  MRSA CULTURE ONLY  Methicillin Resistant Staphylococcus aureus isolated*  --          IMAGING & DIAGNOSTIC TESTING     Radiology Results: I have personally reviewed pertinent reports    XR chest 1 view portable    Result Date: 6/2/2023  Impression: 1  Mild right basilar airspace opacity concerning for pneumonia  2   Background emphysematous changes  Interpretation concordant with preliminary findings from the emergency department  Workstation performed: [id number]     Other Diagnostic Testing: I have personally reviewed pertinent reports  ACTIVE MEDICATIONS     Current Facility-Administered Medications   Medication Dose Route Frequency   • acetaminophen (TYLENOL) tablet 975 mg  975 mg Oral Q8H PRN   • aspirin (ECOTRIN LOW STRENGTH) EC tablet 81 mg  81 mg Oral Daily   • atorvastatin (LIPITOR) tablet 40 mg  40 mg Oral Daily With Dinner   • budesonide (PULMICORT) inhalation solution 0 5 mg  0 5 mg Nebulization Daily   • cefepime (MAXIPIME) 2 g/50 mL dextrose IVPB  2,000 mg Intravenous Q8H   • chlorhexidine (PERIDEX) 0 12 % oral rinse 15 mL  15 mL Mouth/Throat Q12H GABE   • docusate sodium (COLACE) capsule 100 mg  100 mg Oral BID   • doxycycline hyclate (VIBRAMYCIN) capsule 100 mg  100 mg Oral Q12H [address]   • enoxaparin (LOVENOX) subcutaneous injection 40 mg  40 mg Subcutaneous Daily   • formoterol (PERFOROMIST) nebulizer solution 20 mcg  20 mcg Nebulization Q12H   • guaiFENesin (MUCINEX) 12 hr tablet 1,200 mg  1,200 mg Oral Q12H GABE   • ipratropium (ATROVENT) 0 02 % inhalation solution 0 5 mg  0 5 mg Nebulization TID   • levalbuterol (XOPENEX) inhalation solution 1 25 mg  1 25 mg Nebulization TID   • melatonin tablet 3 mg  3 mg Oral HS   • methylPREDNISolone sodium succinate (Solu-MEDROL) injection 40 mg  40 mg Intravenous Q12H GABE   • sodium chloride (OCEAN) 0 65 % nasal spray 1 spray  1 spray Each Nare Q1H PRN   • tamsulosin (FLOMAX) capsule 0 4 mg  0 4 mg Oral Daily With Dinner   • torsemide (DEMADEX) tablet 20 mg  20 mg Oral Daily   • vancomycin (VANCOCIN) IVPB (premix in dextrose) 500 mg 100 mL  500 mg Intravenous Q12H         Disclaimer: Portions of the record may have been created with voice recognition software   Occasional wrong "word or \"sound a like\" substitutions may have occurred due to the inherent limitations of voice recognition software  Careful consideration should be taken to recognize, using context, where substitutions have occurred      Sadiq Holcomb DO   Pulmonary and Critical Care Fellow, PGY-V  [facility]       "

## 2023-06-05 NOTE — OCCUPATIONAL THERAPY NOTE
Occupational Therapy Evaluation     Patient Name: Kulwinder Fee  Today's Date: 6/5/2023  Problem List  Principal Problem:    Acute exacerbation of congestive heart failure (HCC)  Active Problems:    Chronic obstructive pulmonary disease (HCC)    KEVIN and COPD overlap syndrome (HCC)    Osteoporosis    Anemia    Hyponatremia    Pneumonia    Past Medical History  Past Medical History:   Diagnosis Date    Acute metabolic encephalopathy 5/25/7950    Arthritis     Bladder mass     Cardiomyopathy (HCC)     Chest pain     COPD (chronic obstructive pulmonary disease) ([facility] [address] )     COVID-19 virus infection 2/23/2023    CPAP (continuous positive airway pressure) dependence     Emphysema of lung (HCC)     Hypoxia     nocturnal    Left bundle branch block     Multiple pulmonary nodules     last assessed: 10/12/16    Pneumonia     Sleep apnea     Sleep apnea, obstructive     Smoker     Weight loss 8/29/2019     Past Surgical History  Past Surgical History:   Procedure Laterality Date    COLONOSCOPY      CYSTOSCOPY      CYSTOSCOPY  02/17/2021    [place] BLADDER INSTILLATION ANTICARCINOGENIC AGENT N/A 1/3/2020    Procedure: INSTILLATION MITOMYCIN;  Surgeon: Fito Centeno MD;  [facility];  Service: Urology    GA CYSTO W/REMOVAL OF LESIONS SMALL N/A 1/3/2020    Procedure: TRANSURETHRAL RESECTION OF BLADDER TUMOR (TURBT);   Surgeon: Fito Centeno MD;  [facility];  Service: Urology    GA CYSTOURETHROSCOPY WITH BIOPSY N/A 4/13/2021    Procedure: Irvin Randall;  Surgeon: Fito Centeno MD;  [facility];  Service: Urology    GA URL [place] [address] [address] COMPLETE N/A 4/13/2021    Procedure: TRANSURETHRAL RESECTION OF PROSTATE (TURP) BLADDER BIOPSY, FULGURATION;  Surgeon: Fito Centeno MD;  [facility];  Service: Urology             06/05/23 1257   OT Last Visit   OT Visit Date 06/05/23   Note Type   Note type Evaluation   Pain Assessment   Pain Assessment Tool 0-10 Pain Score 9   Pain Location/Orientation Orientation: Right;Location: Foot   Pain Onset/Description Onset: Ongoing; Descriptor: Aching;Descriptor: Discomfort   Patient's Stated Pain Goal No pain   Hospital Pain Intervention(s) Repositioned; Ambulation/increased activity; Emotional support   Restrictions/Precautions   Weight Bearing Precautions Per Order No   Other Precautions Contact/isolation;Multiple lines;Telemetry;O2;Fall Risk;Pain  (HFNC)   Home Living   Type of [facility] One level; Able to live on main level with bedroom/bathroom; Performs ADLs on one level  (3 CARLO)   Bathroom Shower/Tub Walk-in shower   Bathroom Toilet Raised   Bathroom Equipment Grab bars around toilet; Shower chair   Home Equipment Walker;Cane;Wheelchair-manual   Additional Comments Pt reports living in ranch home w/ 3 CARLO   Prior Function   Level of [place] Independent with ADLs; Independent with functional mobility; Needs assistance with IADLS   Lives With [facility] Help From Family   IADLs Independent with driving; Independent with meal prep; Independent with medication management   Falls in the last 6 months 1 to 4   Vocational Retired   Comments (+)    Lifestyle   Autonomy I w/ ADLS (although recently per EMR has needed some assist; pt reports being I); assist w/ IADLS, Mod I w/ transfers and functional mobility w use of RW PRN   Reciprocal Relationships Lives w/ spouse (who works full time) and son (who is full time student but will be home when done school)   Service to Others Retired   Intrinsic Gratification Likes building model cars   ADL   [address]  [address] [facility] 5  Supervision/Setup   [address]  [address] [facility] 5  Supervision/Setup   LB [address] Deficit Don/doff R sock; Don/doff L sock  (CGA)   Toileting Assistance  4  Minimal Assistance   Functional Assistance 4  Minimal Assistance   Functional Deficit Steadying;Verbal cueing;Supervision/safety; Increased time to complete   Bed Mobility   Supine to Sit 5  Supervision   Additional items HOB elevated;Verbal cues   Sit to Supine 5  Supervision   Additional items Increased time required;Verbal cues   Additional Comments Pt sat EOB w/ Fair sitting balance; O2 sat remained in 90s while seated EOB   Transfers   Sit to Stand 4  Minimal assistance   Additional items Assist x 1; Increased time required;Verbal cues   Stand to Sit 4  Minimal assistance   Additional items Assist x 1; Increased time required;Verbal cues   Additional Comments HHA used   Functional Mobility   Functional Mobility 4  Minimal assistance   Additional Comments Pt took lateral side steps along EOB w/ Min A x1; HHA used  pt had loss of balance in standing  Once pt returned to EOB & supine positioning O2 sat dropped to 82%  RN notified and pt's HFNC turned up  W/ deep breathing techniques pt improved O2 to 90% after 1 -2 min seated rest break  Additional items Hand hold assistance   Balance   Static Sitting Fair   Dynamic Sitting Fair -   Static Standing Poor +   Dynamic Standing Poor +   Ambulatory Poor +   Activity Tolerance   Activity Tolerance Patient limited by fatigue;Patient limited by pain   Medical Staff Made Aware Evette RAE   Nurse Made Aware yes, Toshia Stapleton Assessment   RUE Assessment WFL   LUE Assessment   LUE Assessment WFL   Hand Function   Gross Motor Coordination Functional   Fine Motor Coordination Functional   Cognition   Overall Cognitive Status WFL   Arousal/Participation Responsive; Cooperative   Attention Attends with cues to redirect   Orientation Level Oriented X4   Memory Decreased recall of precautions   Following Commands Follows one step commands without difficulty   Comments Pt is pleasant and cooperative; needs occasional cues for safety/pacing     Assessment   Limitation Decreased ADL status; Decreased endurance;Decreased self-care trans;Decreased high-level ADLs   Prognosis Fair   Assessment Pt is a 78 y/o male seen for OT eval s/p adm to B from his heart failure office for increasing respiratory distress and confusion  Pt is dx'd w/ acute exacerbation of congestive heart failure  Pt  has a past medical history of Acute metabolic encephalopathy (), Arthritis, Bladder mass, Cardiomyopathy ([facility] [address] ), Chest pain, COPD (chronic obstructive pulmonary disease) ([facility] [address] ), COVID-19 virus infection (2023), CPAP (continuous positive airway pressure) dependence, Emphysema of lung (HCC), Hypoxia, Left bundle branch block, Multiple pulmonary nodules, Pneumonia, Sleep apnea, Sleep apnea, obstructive, Smoker, and Weight loss (2019)  Pt with active OT orders and up and OOB as tolerated orders  Pt lives with his spouse and son in 3 SH, 3 CARLO  Pt was I w/ ADLS and has assist for IADLS, drove, & required occasional use of DME PTA  Pt is currently demonstrating the following occupational deficits: S UB ADLS, CGA LB ADLS, S bed mobility, Min A transfers and functional mobility w/ HHA  These deficits that are impacting pt's baseline areas of occupation are a result of the following impairments: pain, endurance, activity tolerance, functional mobility, forward functional reach, balance, functional standing tolerance, unsupportive home environment, decreased I w/ ADLS/IADLS, strength and decreased safety awareness  The following Occupational Performance Areas to address include: bathing/shower, toilet hygiene, dressing, medication management, socialization, health maintenance, functional mobility, community mobility, clothing management and household maintenance  Recommend return home w/ Home OT vs  inpt rehab, pending progress upon D/C   Pt to continue to benefit from acute immediate OT services to address the following goals 2-3x/week to  w/in 10-14 days:   Goals   Patient Goals to breathe better LTG Time Frame 10-14   Long Term Goal #1 see below listed goals   Plan   Treatment Interventions ADL retraining;Functional transfer training;UE strengthening/ROM; Endurance training;Patient/family training;Equipment evaluation/education; Compensatory technique education;Continued evaluation; Energy conservation; Activityengagement   Goal Expiration Date 06/19/23   OT Frequency 2-3x/wk   Recommendation   OT Discharge Recommendation Post acute rehabilitation services  (vs  Home OT; pending progress)   Additional Comments  The patient's raw score on the AM-PAC Daily Activity Inpatient Short Form is 21  A raw score of greater than or equal to 19 suggests the patient may benefit from discharge to home  Please refer to the recommendation of the Occupational Therapist for safe discharge planning   Additional Comments 2 Pt seen as a co-session due to the patient's co-morbidities, clinically unstable presentation, and present impairments which are a regression from the patient's baseline   -[facility] Daily Activity Inpatient   Lower Body Dressing 3   Bathing 3   Toileting 3   Upper Body Dressing 4   Grooming 4   Eating 4   Daily Activity Raw Score 21   Daily Activity Standardized Score (Calc for Raw Score >=11) 44 27   AM-[facility] Applied Cognition Inpatient   Following a Speech/Presentation 4   Understanding Ordinary Conversation 4   Taking Medications 4   Remembering Where Things Are Placed or Put Away 4   Remembering List of 4-5 Errands 4   Taking Care of Complicated Tasks 3   Applied Cognition Raw Score 23   Applied Cognition Standardized Score 53 08   End of Consult   Education Provided Yes   Patient Position at End of Consult Supine; All needs within reach   Nurse Communication Nurse aware of consult     GOALS    1) Pt will improve activity tolerance to G for 30 min txment sessions for increase engagement in functional tasks  2) Pt will complete UB/LB dressing/self care w/ mod I using adaptive device and DME as needed  3) Pt will complete bathing w/ Mod I w/ use of AE and DME as needed  4) Pt will complete toileting w/ mod I w/ G hygiene/thoroughness using DME as needed  5) Pt will improve functional transfers to Mod I on/off all surfaces using DME as needed w/ G balance/safety   6) Pt will improve functional mobility during ADL/IADL/leisure tasks to Mod I using DME as needed w/ G balance/safety   7) Pt will participate in simulated IADL management task to increase independence to Mod I w/ G safety and endurance  8) Pt will be attentive 100% of the time during ongoing cognitive assessment w/ G participation to assist w/ safe d/c planning/recommendations  9) Pt will demonstrate G carryover of pt/caregiver education and training as appropriate w/o cues w/ good tolerance to increase safety during functional tasks  10) Pt will demonstrate 100% carryover of energy conservation techniques t/o functional I/ADL/leisure tasks w/o cues s/p skilled education to increase endurance during functional tasks     Kirby Medina MS, OTR/L

## 2023-06-05 NOTE — CONSULTS
Consultation - Nephrology   Tracy Seth  79 y o  male MRN: [id number]  Unit/Bed#: [facility] 205-01 Encounter: [id number]    ASSESSMENT and PLAN:  Acute on chronic hyponatremia  Etiology: Suspect component of SIADH in the setting of ongoing pulmonary issues with severe COPD exacerbation, emphysema,  pneumonia, as well as decreased solute intake, increased fluid intake  Patient does have a history of prostate cancer consider malignancy as a differential  • Baseline sodium fluctuates 129 135 dating back to December 2022  • Admission sodium 21 on 6/1/2023  • Sodium fluctuating 1 24-1 26 and 6/3/2023  • Current sodium 128 given evaded glucose of 160  Work-up:  · Urine osmolality 375, urine sodium 33, serum osmolality 272 on 6/1/2023  Plan:  · We will check uric acid, TSH, a m  cortisol level am  · Placed on fluid restriction 1 2 L daily  · Consider treatment with Samsca  · Given history of CHF and diuretic need sodium salt tablets may not be a good option  · We will discuss with Dr Lopez  · Check BMP in a m  · Recommend nutritional supplements    Acute exacerbation of CHF  • Cardiology following  • Echocardiogram on 5/1/2023 reveals EF of 20%, severe global hypokinesis  • Previously received IV Lasix x2  • Currently on torsemide 20 mg daily  • Patient examining fairly euvolemic to dry side    Suspected pneumonia  • On IV antibiotics  • Pulmonary following  • Primary team following    Acute on chronic hypoxic respiratory failure in the setting of COPD/KEVIN/CHF and pneumonia  • On high flow currently  • Uses BiPAP at night  • Continues on Solu-Medrol 40 mg IV every 12 hours  • Pulmonary following    Overall recommendations:  • Fluid restriction 1 2 L daily  • Check TSH, uric acid level, a m  cortisol level in a m  • BMP in a m    • Recommend nutritional supplements increase solute intake      Medical records through [address] [place] [address] has been reviewed for this patient encounter    HISTORY OF PRESENT ILLNESS:  Requesting Physician: Enedina Mead DO  Reason for Consult: Hyponatremia    Eugenia Pino  is a 79 y o  male who has PMH of hyponatremia dating back to December 2022, cardiomyopathy EF 87%, systolic CHF, COPD, on chronic 2 L nasal cannula KEVIN on CPAP, 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, emphysema, previous tobacco use, history of prostate cancer s/p TURP, who was admitted from heart failure office worsening respiratory distress on 6/1/2023  Patient currently being treated for acute on chronic systolic CHF with diuretcs, pneumonia on IV ABX, and COPD exacerbation on IV Solu-Medrol  Since admission sodium has been below normal and a renal consultation is requested today for further evaluation and treatment options of hyponatremia  On discussion the patient is unaware of having low sodium  He reports he drinks at least 4 glasses of water, 2 cups of coffee, 4 cans of soda daily  He also reports decreased solute intake  Currently having right foot pain  Milas Sofía     PAST MEDICAL HISTORY:  Past Medical History:   Diagnosis Date   • Acute metabolic encephalopathy 7/36/2461   • Arthritis    • Bladder mass    • Cardiomyopathy [facility])    • Chest pain    • COPD (chronic obstructive pulmonary disease) ([facility] [address] )    • COVID-19 virus infection 2/23/2023   • CPAP (continuous positive airway pressure) dependence    • Emphysema of lung (HCC)    • Hypoxia     nocturnal   • Left bundle branch block    • Multiple pulmonary nodules     last assessed: 10/12/16   • Pneumonia    • Sleep apnea    • Sleep apnea, obstructive    • Smoker    • Weight loss 8/29/2019       PAST SURGICAL HISTORY:  Past Surgical History:   Procedure Laterality Date   • COLONOSCOPY     • CYSTOSCOPY     • CYSTOSCOPY  02/17/2021   • [place] BLADDER INSTILLATION ANTICARCINOGENIC AGENT N/A 1/3/2020    Procedure: INSTILLATION MITOMYCIN;  Surgeon: Danilele White MD;  [facility];  Service: Urology   • [place] CYSTO W/REMOVAL OF LESIONS SMALL N/A 1/3/2020    Procedure: Anne Witt RESECTION OF BLADDER TUMOR (TURBT);   Surgeon: Hayley Calvert MD;  Location[facility];  Service: Urology   • [place] CYSTOURETHROSCOPY WITH BIOPSY N/A 2021    Procedure: Mason Achilles;  Surgeon: Hayley Calvert MD;  Location[facility];  Service: Urology   • NH TRURL ELECTROSURG [facility] PROSTATE BLEED COMPLETE N/A 2021    Procedure: TRANSURETHRAL RESECTION OF PROSTATE (TURP) BLADDER BIOPSY, FULGURATION;  Surgeon: Hayley Calvert MD;  Location: [facility];  Service: Urology       ALLERGIES:  No Known Allergies    SOCIAL HISTORY:  Social History     Substance and Sexual Activity   Alcohol Use Not Currently    Comment: rarely     Social History     Substance and Sexual Activity   Drug Use No     Social History     Tobacco Use   Smoking Status Former   • Packs/day: 2 50   • Years: 42 00   • Total pack years: 105 00   • Types: Cigarettes   • Start date:    • Quit date:    • Years since quittin 4   Smokeless Tobacco Former   Tobacco Comments    1 ppd for 37 years,  down to 5 cigs a day, is around second hand smoke       FAMILY HISTORY:  Family History   Problem Relation Age of Onset   • Diabetes Mother        MEDICATIONS:    Current Facility-Administered Medications:   •  acetaminophen (TYLENOL) tablet 975 mg, 975 mg, Oral, Q8H PRN, Jannette Jean-Baptiste PA-C, 975 mg at 23 5480  •  aspirin (ECOTRIN LOW STRENGTH) EC tablet 81 mg, 81 mg, Oral, Daily, Lianna Reyna MD, 81 mg at 23 5998  •  atorvastatin (LIPITOR) tablet 40 mg, 40 mg, Oral, Daily With Dinner, Jannette Jean-Baptiste PA-C, 40 mg at 23 1612  •  budesonide (PULMICORT) inhalation solution 0 5 mg, 0 5 mg, Nebulization, Daily, Denise Sanabria PA-C, 0 5 mg at 23 0719  •  cefepime (MAXIPIME) 2 g/50 mL dextrose IVPB, 2,000 mg, Intravenous, Q8H, Denise Sanabria PA-C, Last Rate: 100 mL/hr at 23 0538, 2,000 mg at 23 0538  •  chlorhexidine (PERIDEX) 0 12 % oral rinse 15 mL, 15 mL, Mouth/Throat, Q12H [facility], Apsami Ut, PA-C, 15 mL at 06/05/23 0826  •  docusate sodium (COLACE) capsule 100 mg, 100 mg, Oral, BID, Denise Sanabria, PA-C, 100 mg at 06/05/23 9376  •  doxycycline hyclate (VIBRAMYCIN) capsule 100 mg, 100 mg, Oral, Q12H [facility], Denisemilan Sanabria PA-C, 100 mg at 06/05/23 5936  •  enoxaparin (LOVENOX) subcutaneous injection 40 mg, 40 mg, Subcutaneous, Daily, Denisemilan Sanabria, PA-C, 40 mg at 06/05/23 8910  •  formoterol (PERFOROMIST) nebulizer solution 20 mcg, 20 mcg, Nebulization, Q12H, Denise Sanabria PA-C, 20 mcg at 06/04/23 1948  •  guaiFENesin (MUCINEX) 12 hr tablet 1,200 mg, 1,200 mg, Oral, Q12H [facility], Denise Sanabria, PA-C, 1,200 mg at 06/05/23 1655  •  ipratropium (ATROVENT) 0 02 % inhalation solution 0 5 mg, 0 5 mg, Nebulization, TID, [facility], 0 5 mg at 06/05/23 0719  •  levalbuterol (Voncile Keas) inhalation solution 1 25 mg, 1 25 mg, Nebulization, TID, [facility], 1 25 mg at 06/05/23 0719  •  melatonin tablet 3 mg, 3 mg, Oral, HS, Denise Sanabria, PA-C, 3 mg at 06/04/23 2204  •  methylPREDNISolone sodium succinate (Solu-MEDROL) injection 40 mg, 40 mg, Intravenous, Q12H [facility], Juan Carlos Rodriguez DO, 40 mg at 06/05/23 5520  •  sodium chloride (OCEAN) 0 65 % nasal spray 1 spray, 1 spray, Each Nare, Q1H PRN, Essence Madden MD, 1 spray at 06/04/23 0654  •  tamsulosin (FLOMAX) capsule 0 4 mg, 0 4 mg, Oral, Daily With Dinner, Malissa Mae PA-C, 0 4 mg at 06/04/23 1612  •  torsemide (DEMADEX) tablet 20 mg, 20 mg, Oral, Daily, Olga Gutierrez DO, 20 mg at 06/05/23 4920  •  vancomycin (VANCOCIN) IVPB (premix in dextrose) 500 mg 100 mL, 500 mg, Intravenous, Q12H, Jaida Cisneros MD, Last Rate: 200 mL/hr at 06/05/23 0822, 500 mg at 06/05/23 2232      REVIEW OF SYSTEMS:  Patient seen and examined at bedside  Review of Systems   Constitutional: Positive for activity change, appetite change and fatigue  HENT: Negative  Eyes: Negative      Respiratory: Positive for shortness of breath and wheezing  Cardiovascular: Negative  Gastrointestinal: Negative  Endocrine: Negative  Genitourinary: Negative  Musculoskeletal:        Right foot discomfort   Skin: Negative  Neurological: Negative  Hematological: Negative  Psychiatric/Behavioral: Negative  PHYSICAL EXAM:  Current weight: Weight - Scale: 46 kg (101 lb 6 4 oz)  Vitals:    06/05/23 0505 06/05/23 0553 06/05/23 0740 06/05/23 1120   BP:   103/67 104/66   BP Location:   Left leg    Pulse:   80 82   Resp:   (!) 24    Temp:   97 7 °F (36 5 °C) 97 8 °F (36 6 °C)   TempSrc:   Oral Oral   SpO2: 99%  95% 95%   Weight:  46 kg (101 lb 6 4 oz)     Height:           Physical Exam  Vitals and nursing note reviewed  Constitutional:       Appearance: He is ill-appearing  HENT:      Head: Normocephalic and atraumatic  Nose: Nose normal       Mouth/Throat:      Mouth: Mucous membranes are dry  Pharynx: Oropharynx is clear  Eyes:      Extraocular Movements: Extraocular movements intact  Conjunctiva/sclera: Conjunctivae normal    Cardiovascular:      Rate and Rhythm: Normal rate and regular rhythm  Pulses: Normal pulses  Heart sounds: Normal heart sounds  Pulmonary:      Breath sounds: Wheezing present  Comments: Conversational dyspnea  Abdominal:      General: Bowel sounds are normal       Palpations: Abdomen is soft  Musculoskeletal:         General: Swelling present  Cervical back: Normal range of motion  Comments: Trace swelling right anterior portion of foot, erythremia  and tender to touch   Skin:     General: Skin is warm and dry  Neurological:      General: No focal deficit present  Mental Status: He is alert and oriented to person, place, and time     Psychiatric:         Mood and Affect: Mood normal          Behavior: Behavior normal                 Lab Results:   Results from last 7 days   Lab Units 06/05/23  0820 06/05/23  0539 06/05/23  0032 "06/04/23  1619 06/04/23  0929 06/04/23  0454 06/03/23  1111 06/03/23  0534 06/03/23  0057 06/02/23  1340   BUN mg/dL 23  --  24 23   < >  --    < > 25   < > 23   CALCIUM mg/dL 8 6  --  8 4 8 6   < >  --    < > 9 3   < > 9 1   CHLORIDE mmol/L 83*  --  82* 79*   < >  --    < > 83*   < > 85*   CO2 mmol/L 42*  --  42* 44*   < >  --    < > 43*   < > 41*   CREATININE mg/dL 0 86  --  0 74 0 81   < >  --    < > 0 61   < > 0 65   HEMATOCRIT %  --  32 5*  --   --   --  32 8*  --  33 9*  --   --    HEMOGLOBIN g/dL  --  10 8*  --   --   --  11 0*  --  11 3*  --   --    POTASSIUM mmol/L 3 8  --  3 9 3 3*   < >  --    < > 4 0   < > 4 4   MAGNESIUM mg/dL  --  1 6  --   --   --  1 8  --   --   --  2 0   PHOSPHORUS mg/dL  --  2 9  --   --   --  3 0  --   --   --  3 6   PLATELETS Thousands/uL  --  175  --   --   --  186  --  178  --   --    SODIUM mmol/L 126*  --  125* 127*   < >  --    < > 125*   < > 125*   WBC Thousand/uL  --  8 15  --   --   --  5 19  --  3 07*  --   --     < > = values in this interval not displayed  Portions of the record may have been created with voice recognition software  Occasional wrong word or \"sound a like\" substitutions may have occurred due to the inherent limitations of voice recognition software   Read the chart carefully and recognize,   "

## 2023-06-05 NOTE — PHYSICAL THERAPY NOTE
Physical Therapy Evaluation     Patient's Name: Ghazal Segura      Admitting Diagnosis  Hypercarbia [R06 89]  Hyponatremia [E87 1]  SOB (shortness of breath) [R06 02]  Acute respiratory failure, unspecified whether with hypoxia or hypercapnia (HCC) [J96 00]    Problem List  Patient Active Problem List   Diagnosis    Nonobstructive atherosclerosis of coronary artery    Nonischemic cardiomyopathy (HCC)    Chronic obstructive pulmonary disease (HCC)    Left bundle-branch block    KEVIN and COPD overlap syndrome (HCC)    Gastroesophageal reflux disease    Impaired fasting glucose    Osteoarthritis    Osteoporosis    Vitamin D deficiency    Mood disorder ([facility] )    Other insomnia    Macrocytosis without anemia    Chronic respiratory failure with hypoxia and hypercapnia (HCC)    Goals of care, counseling/discussion    BPH with obstruction/lower urinary tract symptoms    Prostate cancer ([facility] )    Pulmonary nodules/lesions, multiple    Protein-calorie malnutrition (HCC)    Chronic HFrEF (heart failure with reduced ejection fraction) (HCC)    Anemia    Hyponatremia    Physical deconditioning    Hyperglycemia    COPD exacerbation (HCC)    HLD (hyperlipidemia)    Moderate protein-calorie malnutrition (HCC)    Acute exacerbation of congestive heart failure ([facility] )    Pneumonia       Past Medical History  Past Medical History:   Diagnosis Date    Acute metabolic encephalopathy 6/85/9847    Arthritis     Bladder mass     Cardiomyopathy (HCC)     Chest pain     COPD (chronic obstructive pulmonary disease) ([facility] )     COVID-19 virus infection 2/23/2023    CPAP (continuous positive airway pressure) dependence     Emphysema of lung (HCC)     Hypoxia     nocturnal    Left bundle branch block     Multiple pulmonary nodules     last assessed: 10/12/16    Pneumonia     Sleep apnea     Sleep apnea, obstructive     Smoker     Weight loss 8/29/2019       Past Surgical History  Past Surgical History:   Procedure Laterality Date    COLONOSCOPY CYSTOSCOPY      CYSTOSCOPY  02/17/2021    DE BLADDER INSTILLATION ANTICARCINOGENIC AGENT N/A 1/3/2020    Procedure: INSTILLATION MITOMYCIN;  Surgeon: Tunde Slade MD;  [facility];  Service: Urology    DE CYSTO W/REMOVAL OF LESIONS SMALL N/A 1/3/2020    Procedure: TRANSURETHRAL RESECTION OF BLADDER TUMOR (TURBT); Surgeon: Tunde Slade MD;  [facility];  Service: Urology    DE CYSTOURETHROSCOPY WITH BIOPSY N/A 4/13/2021    Procedure: Kaur Ortiz;  Surgeon: Tunde Slade MD;  [facility];  Service: Urology    DE TRURL ELECTROSURG [address] COMPLETE N/A 4/13/2021    Procedure: TRANSURETHRAL RESECTION OF PROSTATE (TURP) BLADDER BIOPSY, FULGURATION;  Surgeon: Tunde Slade MD;  [facility];  Service: Urology        06/05/23 1258   PT Last Visit   PT Visit Date 06/05/23   Note Type   Note type Evaluation   Pain Assessment   Pain Assessment Tool 0-10   Pain Score 9   Pain Location/Orientation Orientation: Right;Location: [facility] Pain Intervention(s) Ambulation/increased activity;Repositioned   Restrictions/Precautions   Weight Bearing Precautions Per Order No   Braces or Orthoses   (none)   Other Precautions Contact/isolation; Bed Alarm;Multiple lines;Telemetry;O2;Fall Risk;Pain   Home Living   Type of [address] [place] [address] One level;Stairs to enter with rails  (3 carlo)   Bathroom Shower/Tub Walk-in shower   Bathroom Toilet Raised   Bathroom Equipment Shower chair;Grab bars around toilet   Home Equipment Walker;Cane;Wheelchair-manual   Additional Comments Prior to this admission patient resided with spouse (works) and son (currently away in school but should be home soon per patient) in a one level home (3 CARLO)  Patient states that at his baseline he is I with mobility (with and without RW) and ADLS  Family assists with iADLS   Prior Function   Level of [place] Independent with ADLs; Independent with functional mobility; Needs "assistance with IADLS   Lives With [facility] Help From Family   IADLs Independent with driving; Independent with meal prep; Independent with medication management   Falls in the last 6 months 1 to 4   Vocational Retired   [employer]   Additional Pertinent History 79 y o  male admitted [facility]/2023 with symptoms of: SOB and confusion  Diagnoses this admission include acute on chronic CHF and PNA  Family/Caregiver Present No   Cognition   Overall Cognitive Status WFL   Attention Attends with cues to redirect   Orientation Level Oriented X4   Memory Decreased recall of precautions   Following Commands Follows one step commands without difficulty   Comments cues to reduced speed of  mobility for improved pacing   Subjective   Subjective \"I just can't breathe\"   RUE Assessment   RUE Assessment WFL   LUE Assessment   LUE Assessment WFL   RLE Assessment   RLE Assessment WFL  (4-/5 grossly)   LLE Assessment   LLE Assessment WFL  (4-/5 grossly)   Bed Mobility   Supine to Sit 5  Supervision   Additional items HOB elevated   Sit to Supine 5  Supervision   Additional items Increased time required   Additional Comments post eval patient supine in bed with alarm active   Transfers   Sit to Stand 4  Minimal assistance   Additional items Assist x 1; Increased time required;Verbal cues   Stand to Sit 4  Minimal assistance   Additional items Assist x 1; Increased time required;Verbal cues   Additional Comments transfers with hand held assist   Ambulation/Elevation   Gait pattern Decreased foot clearance;Narrow CARLY   Gait Assistance 4  Minimal assist   Additional items Assist x 1   Assistive Device Other (Comment)  (HHA)   Distance 2 feet (lateral side steps EOB)   Balance   Static Sitting Fair   Static Standing Poor +   Ambulatory Poor +   Endurance Deficit   Endurance Deficit Yes   Endurance Deficit Description patient on HFNC  resting sat 93%   seated EOB returning to bed 90-93% " however than desaturate to 83% and RN present to increase HFNC  at end of session sats 90%   Activity Tolerance   Activity Tolerance Patient limited by fatigue;Treatment limited secondary to medical complications (Comment)  (respiratory status)   Medical Staff Made Aware This high complexity evaluation was performed with an occupational therapist due to the patient's co-morbidities, clinically unstable presentation, and present impairments which are a regression from the patient's baseline  Nurse Made Aware maurice to see per RN Vanessa Ruiz   Assessment   Prognosis Fair   Problem List Decreased strength;Decreased endurance; Impaired balance;Decreased mobility;Pain   Assessment PT completed evaluation of 79 y o  male admitted to [address] [place] [address] on 6/1/2023 with symptoms of: SOB and confusion  Diagnoses this admission include acute on chronic CHF and PNA  Patient is currently using HFNC  Patient's current status instabilities include HFNC, continuous O2/HR monitoring, falls risk, bed alarm, and a regression in function from baseline  PMH is significant for osteoporosis, frailty (101 lb), and COPD  Prior to this admission patient resided with spouse (works) and son (currently away in school but should be home soon per patient) in a one level home (3 CARLO)  Patient states that at his baseline he is I with mobility (with and without RW) and ADLS  Family assists with iADLS  Patient presents at time of PT evaluation functioning below baseline and currently w/ overall mobility deficits 2* to: SOB, impaired balance, decreased endurance, gait deviations, decreased activity tolerance and fall risk  During PT evaluation, patient performed supine-->sit transfer S level  He sat EOB x 7 minutes unsupported with S and performed sit<-->stand transfer and lateral side stepping EOB with min-AX1  Unsteady in stance  Evaluation limited today by respiratory status   D/c recommendation is for rehab verus home pending progress  Patient will continue to benefit from continued skilled PT this admission to achieve maximal function and safety  Goals   Patient Goals to  breathe better   LTG Expiration Date 06/19/23   Long Term Goal #1 1) Perform bed mobility mod-I to participate in frequent repositioning and improve skin integrity; 2) Perform functional transfers mod-I to promote I with toileting and OOB mobility; 3) Ambulate 100 feet mod-I with least restrictive device to participate in household and community level mobility; 4) Improve b/l LE strength by 1/2 grade in order to improve efficiency of tranfers; 5) Improve balance by 1 grade to reduce risk for falls; 6) Improve overall activity tolerance to 60 minutes in order to increase patient's ability to engage in mobility tasks; 7) Navigate 3 steps S level in order to safely navigate steps into home   PT Treatment Day 0   Plan   Treatment/Interventions Functional transfer training;LE strengthening/ROM; Therapeutic exercise; Endurance training;Elevations; Patient/family training;Equipment eval/education; Bed mobility;Gait training;OT;Spoke to nursing   PT Frequency 2-3x/wk  (will increase frequency pending improvement in respiratory status and ability to participate in skilled PT services)   Recommendation   PT Discharge Recommendation Post acute rehabilitation services  (versus home with HHPT - pending progress)   Equipment Recommended   (will cont to assess)   AM-PAC Basic Mobility Inpatient   Turning in Flat Bed Without Bedrails 4   Lying on Back to Sitting on Edge of Flat Bed Without Bedrails 4   Moving Bed to Chair 2   Standing Up From Chair Using Arms 2   Walk in Room 2   Climb 3-5 Stairs With Railing 1   Basic Mobility Inpatient Raw Score 15   Basic Mobility Standardized Score 36 97   Highest Level Of Mobility   -HLM Goal 4: Move to chair/commode   -HLM Achieved 4: Move to chair/commode     The patient's AM-PAC Basic Mobility Inpatient Standardized Score is less than 42 9, suggesting this patient may benefit from discharge to post-acute rehabilitation services  Please also refer to the recommendation of the Physical Therapist for safe discharge planning          Delroy Petersen, PT, DPT

## 2023-06-05 NOTE — PLAN OF CARE
Problem: OCCUPATIONAL THERAPY ADULT  Goal: Performs self-care activities at highest level of function for planned discharge setting  See evaluation for individualized goals  Description: Treatment Interventions: ADL retraining, Functional transfer training, UE strengthening/ROM, Endurance training, Patient/family training, Equipment evaluation/education, Compensatory technique education, Continued evaluation, Energy conservation, Activityengagement          See flowsheet documentation for full assessment, interventions and recommendations  Note: Limitation: Decreased ADL status, Decreased endurance, Decreased self-care trans, Decreased high-level ADLs  Prognosis: Fair  Assessment: Pt is a 78 y/o male seen for OT eval s/p adm to hospitals from his heart failure office for increasing respiratory distress and confusion  Pt is dx'd w/ acute exacerbation of congestive heart failure  Pt  has a past medical history of Acute metabolic encephalopathy (9/46/6055), Arthritis, Bladder mass, Cardiomyopathy ([facility] Chest pain, COPD (chronic obstructive pulmonary disease) ([facility] ), COVID-19 virus infection (2/23/2023), CPAP (continuous positive airway pressure) dependence, Emphysema of lung (HCC), Hypoxia, Left bundle branch block, Multiple pulmonary nodules, Pneumonia, Sleep apnea, Sleep apnea, obstructive, Smoker, and Weight loss (8/29/2019)  Pt with active OT orders and up and OOB as tolerated orders  Pt lives with his spouse and son in 3 SH, 3 CARLO  Pt was I w/ ADLS and has assist for IADLS, drove, & required occasional use of DME PTA  Pt is currently demonstrating the following occupational deficits: S UB ADLS, CGA LB ADLS, S bed mobility, Min A transfers and functional mobility w/ HHA   These deficits that are impacting pt's baseline areas of occupation are a result of the following impairments: pain, endurance, activity tolerance, functional mobility, forward functional reach, balance, functional standing tolerance, unsupportive home environment, decreased I w/ ADLS/IADLS, strength and decreased safety awareness  The following Occupational Performance Areas to address include: bathing/shower, toilet hygiene, dressing, medication management, socialization, health maintenance, functional mobility, community mobility, clothing management and household maintenance  Recommend return home w/ Home OT vs  inpt rehab, pending progress upon D/C   Pt to continue to benefit from acute immediate OT services to address the following goals 2-3x/week to  w/in 10-14 days:     OT Discharge Recommendation: Post acute rehabilitation services (vs  Home OT; pending progress)        Malika Booth MS, OTR/L

## 2023-06-05 NOTE — ASSESSMENT & PLAN NOTE
· Vancomycin, cefepime and doxy on board for DRIP score > 5  · Continue IV abx  · Pulmonology to assist with length of abx  · Speech therapy consulted - no change in diet recommended  · Blood cultures negative to date  · MRSA culture positive nares

## 2023-06-05 NOTE — CONSULTS
Podiatry - Consultation    Patient Information:   Amy Bridges  79 y o  male MRN: [id number]  Unit/Bed#: ICCU 205-01 Encounter: [id number]  PCP: Hi Welch DO  Date of Admission:  6/1/2023  Date of Consultation: 06/05/23  Requesting Physician: Dorian Greene DO      ASSESSMENT:    Amy Bridges  is a 79 y o  male with:    1  Bilateral heel early DTI  2  Right heel superficial fissures  3  Right heel pain    PLAN:    · Bilateral heels evaluated, no open lesions noted and no acute clinical signs of infection  · Right foot XRs pending  · Elevation on green foam wedges or pillows when non-ambulatory, Prevalon boots to be worn at all times while NWB  · Rest of care per primary team   · Will discuss this plan with my attending and update as needed  Weightbearing status: as tolerated    SUBJECTIVE:    History of Present Illness:    Amy Bridges  is a 79 y o  male who is originally admitted 6/1/2023 due to COPD exacerbation  Patient has a past medical history of COPD, KEVIN on CPAP, chronic HFrEF, osteoporosis, prostate CA s/p TURP, GERD, anemia, HLD  We are consulted for right heel pain  Patient states that recently his bilateral heels (R>L) have been bothering him  He denies any history of wounds on feet  He reports that he does spend a lot of time in bed and his heels swell up and feel irritated laying against the bed  Wife was in room for visit and agrees with patient's statements  He denies any history of smoking  Review of Systems:    Constitutional: Negative  HENT: Negative  Eyes: Negative  Respiratory: Negative  Cardiovascular: Negative  Gastrointestinal: Negative  Musculoskeletal: Bilateral heel pain, right worse than left  Skin: Superficial heel fissures right foot, otherwise no open lesions  Neurological: Negative  Psych: Negative       Past Medical and Surgical History:     Past Medical History:   Diagnosis Date   • Acute metabolic encephalopathy 7/05/4108   • Arthritis    • Bladder mass    • Cardiomyopathy [facility])    • Chest pain    • COPD (chronic obstructive pulmonary disease) ([facility])    • COVID-19 virus infection 2/23/2023   • CPAP (continuous positive airway pressure) dependence    • Emphysema of lung (HCC)    • Hypoxia     nocturnal   • Left bundle branch block    • Multiple pulmonary nodules     last assessed: 10/12/16   • Pneumonia    • Sleep apnea    • Sleep apnea, obstructive    • Smoker    • Weight loss 8/29/2019       Past Surgical History:   Procedure Laterality Date   • COLONOSCOPY     • CYSTOSCOPY     • CYSTOSCOPY  02/17/2021   • CO BLADDER INSTILLATION ANTICARCINOGENIC AGENT N/A 1/3/2020    Procedure: INSTILLATION MITOMYCIN;  Surgeon: Getachew Ybarra MD;  [facility];  Service: Urology   • CO CYSTO W/REMOVAL OF LESIONS SMALL N/A 1/3/2020    Procedure: TRANSURETHRAL RESECTION OF BLADDER TUMOR (TURBT);   Surgeon: Getachew Ybarra MD;  [facility];  Service: Urology   • CO CYSTOURETHROSCOPY WITH BIOPSY N/A 4/13/2021    Procedure: CYSTOSCOPY WITH BIOPSIES;  Surgeon: Getachew Ybarra MD;  [facility];  Service: Urology   • CO TRURL ELECTROSURG [facility] PROSTATE BLEED COMPLETE N/A 4/13/2021    Procedure: TRANSURETHRAL RESECTION OF PROSTATE (TURP) BLADDER BIOPSY, FULGURATION;  Surgeon: Getachew Ybarra MD;  Location: [facility];  Service: Urology       Meds/Allergies:    Medications Prior to Admission   Medication   • acetaminophen (TYLENOL) 325 mg tablet   • albuterol (PROVENTIL HFA,VENTOLIN HFA) 90 mcg/act inhaler   • aspirin 81 mg chewable tablet   • azithromycin (ZITHROMAX) 250 mg tablet   • budesonide (PULMICORT) 0 5 mg/2 mL nebulizer solution   • cyanocobalamin (VITAMIN B-12) 1000 MCG tablet   • Diclofenac Sodium (VOLTAREN) 1 %   • docusate sodium (COLACE) 100 mg capsule   • ergocalciferol (VITAMIN D2) 50,000 units   • formoterol (PERFOROMIST) 20 MCG/2ML nebulizer solution   • furosemide (LASIX) 20 mg tablet   • guaiFENesin ([place] [address]) 600 mg 12 hr tablet   • ipratropium (ATROVENT) 0 02 % nebulizer solution   • ipratropium-albuterol (DUO-NEB) 0 5-2 5 mg/3 mL nebulizer solution   • levalbuterol (XOPENEX) 1 25 mg/0 5 mL nebulizer solution   • Melatonin 5 MG TABS   • predniSONE 5 mg tablet   • rosuvastatin (CRESTOR) 10 MG tablet   • senna-docusate sodium (SENOKOT S) 8 6-50 mg per tablet   • tamsulosin (FLOMAX) 0 4 mg   • torsemide (DEMADEX) 20 mg tablet       No Known Allergies    Social History:     Marital Status: /Civil Union    Substance Use History:   Social History     Substance and Sexual Activity   Alcohol Use Not Currently    Comment: rarely     Social History     Tobacco Use   Smoking Status Former   • Packs/day: 2 50   • Years: 42 00   • Total pack years: 105 00   • Types: Cigarettes   • Start date:    • Quit date:    • Years since quittin 4   Smokeless Tobacco Former   Tobacco Comments    1 ppd for 37 years, 2010 down to 5 cigs a day, is around second hand smoke     Social History     Substance and Sexual Activity   Drug Use No       Family History:    Family History   Problem Relation Age of Onset   • Diabetes Mother          OBJECTIVE:    Vitals:   Blood Pressure: (!) 84/61 (23 1515)  Pulse: 80 (23 1515)  Temperature: 97 8 °F (36 6 °C) (23 1515)  Temp Source: Oral (23 1515)  Respirations: (!) 24 (23 0740)  Height: 5' (152 4 cm) (23 1620)  Weight - Scale: 46 kg (101 lb 6 4 oz) (23 0553)  SpO2: 95 % (23 1515)    Physical Exam:    General Appearance: Alert, cooperative, no distress  HEENT: Head normocephalic, atraumatic, without obvious abnormality  Heart: Normal rate and rhythm  Lungs: Non-labored breathing  No respiratory distress  Abdomen: Without distension  Psychiatric: AAOx3  Lower Extremity:  Vascular:   Right DP and PT pulses are present  Left DP and PT pulses are present  CRT < 3 seconds at the digits  +0/4 edema noted at bilateral lower extremities   Pedal "hair is absent  Skin temperature is WNL bilaterally  Musculoskeletal:  MMT is 5/5 in all muscle compartments bilaterally  ROM at the 1st MPJ and ankle joint are intact bilaterally with the leg extended  Pain on palpation of bilateral heels  No gross deformities noted  Dermatological:  Multiple small superficial fissures right heel  No open lesions bilateral lower extremities  Without signs of active infection: no purulence, no malodor, no ascending erythema, no crepitus, no fluctuance  Neurological:  Gross sensation is intact  Protective sensation is intact  Patient Denies numbness and/or paresthesias  Additional data:     Lab Results: I have personally reviewed pertinent labs including:    Results from last 7 days   Lab Units 06/05/23  0539   EOS PCT % 0   HEMATOCRIT % 32 5*   HEMOGLOBIN g/dL 10 8*   LYMPHS PCT % 4*   MONOS PCT % 7   NEUTROS PCT % 89*   PLATELETS Thousands/uL 175   WBC Thousand/uL 8 15     Results from last 7 days   Lab Units 06/05/23  0820   BUN mg/dL 23   CALCIUM mg/dL 8 6   CHLORIDE mmol/L 83*   CO2 mmol/L 42*   CREATININE mg/dL 0 86   POTASSIUM mmol/L 3 8     Results from last 7 days   Lab Units 06/01/23  1245   INR  0 84       Cultures: I have personally reviewed pertinent cultures including:    Results from last 7 days   Lab Units 06/01/23  1245   BLOOD CULTURE  No Growth at 72 hrs  No Growth at 72 hrs  Imaging: I have personally reviewed pertinent reports in PACS  EKG, Pathology, and Other Studies: I have personally reviewed pertinent reports  ** Please Note: Portions of the record may have been created with voice recognition software  Occasional wrong word or \"sound a like\" substitutions may have occurred due to the inherent limitations of voice recognition software  Read the chart carefully and recognize, using context, where substitutions have occurred   **  "

## 2023-06-05 NOTE — PROGRESS NOTES
Progress note - Palliative and Supportive Care   Sheba Heredia  79 y o  male [id number]    Patient Active Problem List   Diagnosis   • Nonobstructive atherosclerosis of coronary artery   • Nonischemic cardiomyopathy (HCC)   • Chronic obstructive pulmonary disease (HCC)   • Left bundle-branch block   • KEVIN and COPD overlap syndrome ([facility])   • Gastroesophageal reflux disease   • Impaired fasting glucose   • Osteoarthritis   • Osteoporosis   • Vitamin D deficiency   • Mood disorder ([facility])   • Other insomnia   • Macrocytosis without anemia   • Chronic respiratory failure with hypoxia and hypercapnia ([facility])   • Goals of care, counseling/discussion   • BPH with obstruction/lower urinary tract symptoms   • Prostate cancer ([facility] )   • Pulmonary nodules/lesions, multiple   • Protein-calorie malnutrition (HCC)   • Chronic HFrEF (heart failure with reduced ejection fraction) ([facility])   • Anemia   • Physical deconditioning   • Hyperglycemia   • COPD exacerbation ([facility])   • HLD (hyperlipidemia)   • Moderate protein-calorie malnutrition ([facility])   • Acute exacerbation of congestive heart failure ([facility] )     Active issues specifically addressed today include:   COPD exacerbation  Chronic respiratory failure with hypoxia and hypercapnia  Chronic HFrEF  PCM  NIC  Goals of care discussion    Plan:  1  Symptom management - per Primary team    # Psychosocial    -Support provided    2) Goals of Care / Advanced Care Planning     Code Status: Full Code - Level 1   -Patient alert and oriented this morning  Reviewed Code Status and goals with patient  He confirms that he is to remain FULL Code and wants all care measures to be taken including cardiac resuscitation and intubation if necessary    -He states his foot pain is controlled with his pain medications  Reviewed medication list and patient is on Acetaminophen 975mg q8 hours PRN with one dose in the past 24 hours  Discussed with primary team, plan is for Podiatry to evaluate for foot pain   Patient denies any other needs from a pain standpoint  Decisional apparatus:  Patient is competent on my exam today  If competence is lost, patient's substitute decision maker would default to wife [place]  Advance Directive / Living Will / POLST:  None on file    [address] [facility] DO Shayla  Palliative and Supportive Care  Clinic/Answering Service: [phone]  You can find me on TigerConnect  Chief Complaint  Chief Complaint   Patient presents with   • Altered Mental Status     With sob as of 0930  Evaluated by vascular and sent to ED  Hx CHF COPD MI       Subjective:  Patient seen and examined sitting up in bed without acute distress  He states he is doing better today comparably to the days prior  He understands he is needing antibiotics for further treatment  We reviewed code status today and he confirms he wishes to remain Full Code with all resuscitative measures to be performed (cardiac and intubation as needed)  He denies any other needs at this time  Review of Systems   Constitutional: Negative for chills and fever  Cardiovascular: Negative for chest pain and leg swelling  Respiratory: Negative for cough and shortness of breath  Skin: Negative  Musculoskeletal:        Admits to Right foot pain that has been chronic  Notable radiation up the right leg  Periods of stabbing pain that shoots up leg  States pain medications helping  Gastrointestinal: Negative for bloating, abdominal pain, constipation, diarrhea, nausea and vomiting  Genitourinary: Negative for dysuria  Neurological: Negative for headaches           MEDICATIONS / ALLERGIES:     all current active meds have been reviewed, current meds:   Current Facility-Administered Medications   Medication Dose Route Frequency   • acetaminophen (TYLENOL) tablet 975 mg  975 mg Oral Q8H PRN   • aspirin (ECOTRIN LOW STRENGTH) EC tablet 81 mg  81 mg Oral Daily   • atorvastatin (LIPITOR) tablet 40 mg  40 mg Oral Daily With Dinner   • budesonide (PULMICORT) inhalation solution 0 5 mg  0 5 mg Nebulization Daily   • cefepime (MAXIPIME) 2 g/50 mL dextrose IVPB  2,000 mg Intravenous Q8H   • chlorhexidine (PERIDEX) 0 12 % oral rinse 15 mL  15 mL Mouth/Throat Q12H GABE   • docusate sodium (COLACE) capsule 100 mg  100 mg Oral BID   • doxycycline hyclate (VIBRAMYCIN) capsule 100 mg  100 mg Oral Q12H [facility]   • enoxaparin (LOVENOX) subcutaneous injection 40 mg  40 mg Subcutaneous Daily   • formoterol (PERFOROMIST) nebulizer solution 20 mcg  20 mcg Nebulization Q12H   • guaiFENesin (MUCINEX) 12 hr tablet 1,200 mg  1,200 mg Oral Q12H [facility]   • ipratropium (ATROVENT) 0 02 % inhalation solution 0 5 mg  0 5 mg Nebulization TID   • levalbuterol (XOPENEX) inhalation solution 1 25 mg  1 25 mg Nebulization TID   • melatonin tablet 3 mg  3 mg Oral HS   • methylPREDNISolone sodium succinate (Solu-MEDROL) injection 40 mg  40 mg Intravenous Q12H [facility]   • sodium chloride (OCEAN) 0 65 % nasal spray 1 spray  1 spray Each Nare Q1H PRN   • tamsulosin (FLOMAX) capsule 0 4 mg  0 4 mg Oral Daily With Dinner   • torsemide (DEMADEX) tablet 20 mg  20 mg Oral Daily   • vancomycin (VANCOCIN) IVPB (premix in dextrose) 500 mg 100 mL  500 mg Intravenous Q12H    and PTA meds:   Prior to Admission Medications   Prescriptions Last Dose Informant Patient Reported? Taking?    Diclofenac Sodium (VOLTAREN) 1 %  Spouse/Significant Other, Self Yes No   Sig: APPLY 2 GRAMS 4 TIMES A DAY   Melatonin 5 MG TABS  Spouse/Significant Other, Self Yes No   Sig: Take 1 tablet by mouth daily at bedtime   acetaminophen (TYLENOL) 325 mg tablet  Spouse/Significant Other, Self No No   Sig: Take 3 tablets (975 mg total) by mouth every 8 (eight) hours as needed for mild pain or moderate pain   albuterol (PROVENTIL HFA,VENTOLIN HFA) 90 mcg/act inhaler  Spouse/Significant Other, Self No No   Sig: Inhale 2 puffs every 6 (six) hours as needed for wheezing   aspirin 81 mg chewable tablet  Self, Spouse/Significant Other No No   Sig: Chew 1 tablet (81 mg total) daily   azithromycin (ZITHROMAX) 250 mg tablet  Self, Spouse/Significant Other No No   Sig: Take 1 tablet (250 mg total) by mouth 3 (three) times a week   budesonide (PULMICORT) 0 5 mg/2 mL nebulizer solution  Self, Spouse/Significant Other No No   Sig: TAKE 2 ML (0 5 MG TOTAL) BY NEBULIZATION TWICE A DAY RINSE MOUTH AFTER USE   cyanocobalamin (VITAMIN B-12) 1000 MCG tablet  Self, Spouse/Significant Other No No   Sig: Take 1 tablet (1,000 mcg total) by mouth daily   docusate sodium (COLACE) 100 mg capsule  Spouse/Significant Other, Self Yes No   Sig: Take 100 mg by mouth 2 (two) times a day   ergocalciferol (VITAMIN D2) 50,000 units  Self, Spouse/Significant Other No No   Sig: TAKE 1 CAPSULE (50,000 UNITS TOTAL) BY MOUTH EVERY 28 DAYS   formoterol (PERFOROMIST) 20 MCG/2ML nebulizer solution  Self, Spouse/Significant Other Yes No   Sig: TAKE 2 ML (20 MCG TOTAL) BY NEBULIZATION 2 (TWO) TIMES A DAY   furosemide (LASIX) 20 mg tablet   Yes No   Sig: Take 10 mg by mouth daily   guaiFENesin (MUCINEX) 600 mg 12 hr tablet  Self, Spouse/Significant Other No No   Sig: Take 2 tablets (1,200 mg total) by mouth every 12 (twelve) hours   ipratropium (ATROVENT) 0 02 % nebulizer solution  Self, Spouse/Significant Other No No   Sig: Take 2 5 mL (0 5 mg total) by nebulization 3 (three) times a day   ipratropium-albuterol (DUO-NEB) 0 5-2 5 mg/3 mL nebulizer solution   No No   Sig: USE 1 VIAL VIA NEBULIZER 3 TIMES A DAY   levalbuterol (XOPENEX) 1 25 mg/0 5 mL nebulizer solution  Spouse/Significant Other, Self No No   Sig: Take 0 5 mL (1 25 mg total) by nebulization 3 (three) times a day   predniSONE 5 mg tablet  Spouse/Significant Other, Self No No   Sig: Take 1 tablet (5 mg total) by mouth daily Do not start before March 5, 2023     rosuvastatin (CRESTOR) 10 MG tablet  Spouse/Significant Other, Self No No   Sig: Take 1 tablet (10 mg total) by mouth daily   senna-docusate sodium (SENOKOT S) 8 6-50 mg per tablet  Spouse/Significant Other, Self No No   Sig: Take 1 tablet by mouth daily at bedtime   Patient not taking: Reported on 6/1/2023   tamsulosin (FLOMAX) 0 4 mg  Spouse/Significant Other, Self No No   Sig: Take 1 capsule (0 4 mg total) by mouth daily with dinner   torsemide (DEMADEX) 20 mg tablet  Self, Spouse/Significant Other No No   Sig: Take 1 tablet (20 mg total) by mouth daily      Facility-Administered Medications: None       No Known Allergies    OBJECTIVE:    Physical Exam  Physical Exam  Vitals reviewed  Constitutional:       General: He is not in acute distress  Appearance: He is ill-appearing  He is not toxic-appearing or diaphoretic  Comments: Thin, frail and cachetic   HENT:      Head: Normocephalic  Mouth/Throat:      Mouth: Mucous membranes are moist    Cardiovascular:      Rate and Rhythm: Normal rate  Pulmonary:      Comments: Tachypnea noted on supplemental oxygen  Abdominal:      General: There is no distension  Palpations: Abdomen is soft  Tenderness: There is no abdominal tenderness  Musculoskeletal:         General: No swelling or tenderness (no tenderness to palpation of the BLE )  Skin:     General: Skin is warm and dry  Coloration: Skin is pale  Skin is not cyanotic  Neurological:      Mental Status: He is alert  Psychiatric:         Mood and Affect: Mood normal  Mood is not anxious  Speech: Speech normal          Behavior: Behavior normal  Behavior is not agitated  Cognition and Memory: Memory is not impaired  Lab Results:   I have personally reviewed pertinent labs  , CBC:   Lab Results   Component Value Date    HCT 32 5 (L) 06/05/2023    HGB 10 8 (L) 06/05/2023    MCH 31 6 06/05/2023    MCHC 33 2 06/05/2023    MCV 95 06/05/2023    MPV 9 6 06/05/2023    NRBC 0 06/05/2023     06/05/2023    RBC 3 42 (L) 06/05/2023    RDW 12 4 06/05/2023    WBC 8 15 06/05/2023   , CMP:   Lab Results   Component Value Date    BUN 23 "06/05/2023    CALCIUM 8 6 06/05/2023    CL 83 (L) 06/05/2023    CO2 42 (H) 06/05/2023    CREATININE 0 86 06/05/2023    EGFR 90 06/05/2023    K 3 8 06/05/2023    SODIUM 126 (L) 06/05/2023   , BMP:  Lab Results   Component Value Date    AGAP 1 (L) 06/05/2023    BUN 23 06/05/2023    CALCIUM 8 6 06/05/2023    CL 83 (L) 06/05/2023    CO2 42 (H) 06/05/2023    CREATININE 0 86 06/05/2023    EGFR 90 06/05/2023    GLUC 160 (H) 06/05/2023    K 3 8 06/05/2023    SODIUM 126 (L) 06/05/2023   , PT/PTT:No results found for: \"PT\", \"PTT\"  Imaging Studies: Reviewed  EKG, Pathology, and Other Studies: Reviewed    Counseling / Coordination of Care  Total floor / unit time spent today 20 minutes  Greater than 50% of total time was spent with the patient and / or family counseling and / or coordination of care  A description of the counseling / coordination of care: symptom evaluation and goals  This note was shared with the patient  Jeyson Batista DO  Hospice and Palliative Care Fellow  Palliative & Supportive Care  Clinic/Answering Service: [phone]  You can find me on [employer]  Portions of this document may have been created using dictation software and as such some \"sound alike\" terms may have been generated by the system  Do not hesitate to contact me with any questions or clarifications      "

## 2023-06-05 NOTE — PLAN OF CARE
Problem: PHYSICAL THERAPY ADULT  Goal: Performs mobility at highest level of function for planned discharge setting  See evaluation for individualized goals  Description: Treatment/Interventions: Functional transfer training, LE strengthening/ROM, Therapeutic exercise, Endurance training, Elevations, Patient/family training, Equipment eval/education, Bed mobility, Gait training, OT, Spoke to nursing  Equipment Recommended:  (will cont to assess)       See flowsheet documentation for full assessment, interventions and recommendations  Note: Prognosis: Fair  Problem List: Decreased strength, Decreased endurance, Impaired balance, Decreased mobility, Pain  Assessment: PT completed evaluation of 79 y o  male admitted to [facility] on 6/1/2023 with symptoms of: SOB and confusion  Diagnoses this admission include acute on chronic CHF and PNA  Patient is currently using HFNC  Patient's current status instabilities include HFNC, continuous O2/HR monitoring, falls risk, bed alarm, and a regression in function from baseline  PMH is significant for osteoporosis, frailty (101 lb), and COPD  Prior to this admission patient resided with spouse (works) and son (currently away in school but should be home soon per patient) in a one level home (3 [address])  Patient states that at his baseline he is I with mobility (with and without RW) and ADLS  Family assists with iADLS  Patient presents at time of PT evaluation functioning below baseline and currently w/ overall mobility deficits 2* to: SOB, impaired balance, decreased endurance, gait deviations, decreased activity tolerance and fall risk  During PT evaluation, patient performed supine-->sit transfer S level  He sat EOB x 7 minutes unsupported with S and performed sit<-->stand transfer and lateral side stepping EOB with min-AX1  Unsteady in stance  Evaluation limited today by respiratory status   D/c recommendation is for rehab verus home pending progress  Patient will continue to benefit from continued skilled PT this admission to achieve maximal function and safety  PT Discharge Recommendation: Post acute rehabilitation services (versus home with HHPT - pending progress)    See flowsheet documentation for full assessment

## 2023-06-06 LAB
ANION GAP SERPL CALCULATED.3IONS-SCNC: 0 MMOL/L (ref 4–13)
ANION GAP SERPL CALCULATED.3IONS-SCNC: 1 MMOL/L (ref 4–13)
BACTERIA BLD CULT: NORMAL
BACTERIA BLD CULT: NORMAL
BUN SERPL-MCNC: 24 MG/DL (ref 5–25)
BUN SERPL-MCNC: 25 MG/DL (ref 5–25)
BUN SERPL-MCNC: 27 MG/DL (ref 5–25)
CALCIUM SERPL-MCNC: 8.5 MG/DL (ref 8.3–10.1)
CALCIUM SERPL-MCNC: 8.7 MG/DL (ref 8.3–10.1)
CALCIUM SERPL-MCNC: 8.9 MG/DL (ref 8.3–10.1)
CHLORIDE SERPL-SCNC: 81 MMOL/L (ref 96–108)
CHLORIDE SERPL-SCNC: 84 MMOL/L (ref 96–108)
CHLORIDE SERPL-SCNC: 85 MMOL/L (ref 96–108)
CO2 SERPL-SCNC: 43 MMOL/L (ref 21–32)
CO2 SERPL-SCNC: 44 MMOL/L (ref 21–32)
CO2 SERPL-SCNC: >45 MMOL/L (ref 21–32)
CORTIS SERPL-MCNC: 2.3 UG/DL
CREAT SERPL-MCNC: 0.79 MG/DL (ref 0.6–1.3)
CREAT SERPL-MCNC: 0.85 MG/DL (ref 0.6–1.3)
CREAT SERPL-MCNC: 1.05 MG/DL (ref 0.6–1.3)
GFR SERPL CREATININE-BSD FRML MDRD: 73 ML/MIN/1.73SQ M
GFR SERPL CREATININE-BSD FRML MDRD: 90 ML/MIN/1.73SQ M
GFR SERPL CREATININE-BSD FRML MDRD: 93 ML/MIN/1.73SQ M
GLUCOSE SERPL-MCNC: 116 MG/DL (ref 65–140)
GLUCOSE SERPL-MCNC: 159 MG/DL (ref 65–140)
GLUCOSE SERPL-MCNC: 293 MG/DL (ref 65–140)
POTASSIUM SERPL-SCNC: 3.4 MMOL/L (ref 3.5–5.3)
POTASSIUM SERPL-SCNC: 4 MMOL/L (ref 3.5–5.3)
POTASSIUM SERPL-SCNC: 4.2 MMOL/L (ref 3.5–5.3)
SODIUM SERPL-SCNC: 127 MMOL/L (ref 135–147)
SODIUM SERPL-SCNC: 128 MMOL/L (ref 135–147)
SODIUM SERPL-SCNC: 129 MMOL/L (ref 135–147)
TSH SERPL DL<=0.05 MIU/L-ACNC: 1.3 UIU/ML (ref 0.45–4.5)
URATE SERPL-MCNC: 2.3 MG/DL (ref 3.5–8.5)

## 2023-06-06 PROCEDURE — 94640 AIRWAY INHALATION TREATMENT: CPT

## 2023-06-06 PROCEDURE — 80048 BASIC METABOLIC PNL TOTAL CA: CPT

## 2023-06-06 PROCEDURE — 99232 SBSQ HOSP IP/OBS MODERATE 35: CPT | Performed by: INTERNAL MEDICINE

## 2023-06-06 PROCEDURE — 94760 N-INVAS EAR/PLS OXIMETRY 1: CPT

## 2023-06-06 PROCEDURE — 84443 ASSAY THYROID STIM HORMONE: CPT | Performed by: NURSE PRACTITIONER

## 2023-06-06 PROCEDURE — 99233 SBSQ HOSP IP/OBS HIGH 50: CPT | Performed by: HOSPITALIST

## 2023-06-06 PROCEDURE — 94003 VENT MGMT INPAT SUBQ DAY: CPT

## 2023-06-06 PROCEDURE — 84550 ASSAY OF BLOOD/URIC ACID: CPT | Performed by: NURSE PRACTITIONER

## 2023-06-06 PROCEDURE — 80048 BASIC METABOLIC PNL TOTAL CA: CPT | Performed by: INTERNAL MEDICINE

## 2023-06-06 PROCEDURE — 94664 DEMO&/EVAL PT USE INHALER: CPT

## 2023-06-06 PROCEDURE — 82533 TOTAL CORTISOL: CPT | Performed by: NURSE PRACTITIONER

## 2023-06-06 RX ADMIN — METHYLPREDNISOLONE SODIUM SUCCINATE 40 MG: 40 INJECTION, POWDER, FOR SOLUTION INTRAMUSCULAR; INTRAVENOUS at 08:40

## 2023-06-06 RX ADMIN — LEVALBUTEROL HYDROCHLORIDE 1.25 MG: 1.25 SOLUTION RESPIRATORY (INHALATION) at 07:25

## 2023-06-06 RX ADMIN — GUAIFENESIN 1200 MG: 600 TABLET, EXTENDED RELEASE ORAL at 20:23

## 2023-06-06 RX ADMIN — CHLORHEXIDINE GLUCONATE 15 ML: 1.2 SOLUTION ORAL at 08:40

## 2023-06-06 RX ADMIN — TORSEMIDE 20 MG: 20 TABLET ORAL at 08:40

## 2023-06-06 RX ADMIN — TAMSULOSIN HYDROCHLORIDE 0.4 MG: 0.4 CAPSULE ORAL at 17:34

## 2023-06-06 RX ADMIN — BUDESONIDE 0.5 MG: 0.5 INHALANT ORAL at 07:26

## 2023-06-06 RX ADMIN — ASPIRIN 81 MG: 81 TABLET, COATED ORAL at 08:40

## 2023-06-06 RX ADMIN — FORMOTEROL FUMARATE DIHYDRATE 20 MCG: 20 SOLUTION RESPIRATORY (INHALATION) at 07:25

## 2023-06-06 RX ADMIN — IPRATROPIUM BROMIDE 0.5 MG: 0.5 SOLUTION RESPIRATORY (INHALATION) at 07:26

## 2023-06-06 RX ADMIN — DOXYCYCLINE 100 MG: 100 CAPSULE ORAL at 08:40

## 2023-06-06 RX ADMIN — FORMOTEROL FUMARATE DIHYDRATE 20 MCG: 20 SOLUTION RESPIRATORY (INHALATION) at 19:48

## 2023-06-06 RX ADMIN — ENOXAPARIN SODIUM 40 MG: 40 INJECTION SUBCUTANEOUS at 08:40

## 2023-06-06 RX ADMIN — METHYLPREDNISOLONE SODIUM SUCCINATE 40 MG: 40 INJECTION, POWDER, FOR SOLUTION INTRAMUSCULAR; INTRAVENOUS at 20:23

## 2023-06-06 RX ADMIN — Medication 7.5 MG: at 11:00

## 2023-06-06 RX ADMIN — ATORVASTATIN CALCIUM 40 MG: 40 TABLET, FILM COATED ORAL at 17:34

## 2023-06-06 RX ADMIN — IPRATROPIUM BROMIDE 0.5 MG: 0.5 SOLUTION RESPIRATORY (INHALATION) at 19:48

## 2023-06-06 RX ADMIN — DOCUSATE SODIUM 100 MG: 100 CAPSULE, LIQUID FILLED ORAL at 08:40

## 2023-06-06 RX ADMIN — DOXYCYCLINE 100 MG: 100 CAPSULE ORAL at 20:23

## 2023-06-06 RX ADMIN — LEVALBUTEROL HYDROCHLORIDE 1.25 MG: 1.25 SOLUTION RESPIRATORY (INHALATION) at 19:48

## 2023-06-06 RX ADMIN — GUAIFENESIN 1200 MG: 600 TABLET, EXTENDED RELEASE ORAL at 08:40

## 2023-06-06 RX ADMIN — DOCUSATE SODIUM 100 MG: 100 CAPSULE, LIQUID FILLED ORAL at 17:35

## 2023-06-06 RX ADMIN — MELATONIN 3 MG: at 20:23

## 2023-06-06 RX ADMIN — CHLORHEXIDINE GLUCONATE 15 ML: 1.2 SOLUTION ORAL at 20:23

## 2023-06-06 NOTE — PROGRESS NOTES
PULMONOLOGY PROGRESS NOTE     Name: Patricia Sanabria  Age & Sex: 79 y o  male   MRN: [id number]  Unit/Bed#: [facility] 205-01   Encounter: [id number]    PATIENT INFORMATION     Name: Patricia Sanabria  Age & Sex: 79 y o  male   MRN: [id number]  Hospital Stay Days: 5    ASSESSMENT/PLAN     Assessment:   1  Acute on chronic respiratory failure with hypoxia and hypercapnia  2  Acute on chronic heart failure with reduced ejection fraction  3  Acute exacerbation of COPD  4  Very severe COPD, end-stage  5  KEVIN/COPD overlap  6  Concern for pneumonia    Plan:  • Continue supplemental oxygen to maintain SPO2 greater than 88%  Continue to quires low levels of high flow nasal cannula  Could potentially consider switching to mid flow nasal cannula  • We will continue with BiPAP at night due to likely susan mitten KEVIN/COPD with severe risk for hypercapnia and respiratory failure  • Currently he is on bronchodilators with Xopenex/Atrovent as an Perforomist/Pulmicort  • He additionally is on Solu-Medrol 40 mg 3 times daily  Consider de-escalation of Solu-Medrol tomorrow to 40 mg twice daily as he was not actively wheezing on examination today  • Continue with aggressive diuresis as likely his heart failure is contributing to his acute presentation  • He was started on broad-spectrum antibiotics on 6/3 due to elevated drip score  Procalcitonin has been grossly unremarkable  MRSA nares culture positive  Currently is on vancomycin/doxycycline/cefepime  Blood cultures are negative for 72 hours  Recommend discontinuation of vancomycin and cefepime  Can continue doxycycline for 5-day course for tracheobronchitis  • Multiple discussions previously with hospice/palliative care  Patient seems to be uncertain with his degree of acceptance of his current conditions and likelihood of meaningful recovery  Palliative care input is greatly appreciated  Hospice is certainly an option for him and appropriate         SUBJECTIVE Patient seen and examined  No acute events overnight  Reports shortness of breath  OBJECTIVE     Vitals:    23 0600 23 0726 23 0730 23 0829   BP:   98/60    BP Location:       Pulse:   86    Resp:       Temp:    98 1 °F (36 7 °C)   TempSrc:       SpO2:  93% 93%    Weight: 47 2 kg (104 lb)      Height:          Temperature:   Temp (24hrs), Av 6 °F (36 4 °C), Min:97 1 °F (36 2 °C), Max:98 1 °F (36 7 °C)    Temperature: 98 1 °F (36 7 °C)  Intake & Output:  I/O        07 07 0701   07 0701   07    P  O  400 120 180    I V  (mL/kg)       IV Piggyback 130      Total Intake(mL/kg) 530 (11 5) 120 (2 6) 180 (3 9)    Urine (mL/kg/hr) 1908 (1 7) 700 (0 6)     Stool  0     Total Output 1908 700     Net -1378 -580 +180           Unmeasured Urine Occurrence 1 x 3 x     Unmeasured Stool Occurrence  1 x         Weights:   IBW (Ideal Body Weight): 50 kg    Body mass index is 20 31 kg/m²  Weight (last 2 days)     Date/Time Weight    23 0600 47 2 (104)    23 0553 46 (101 4)    23 0600 45 9 (101 3)        Physical Exam  Vitals and nursing note reviewed  Constitutional:       General: He is not in acute distress  Appearance: He is well-developed  He is cachectic  He is ill-appearing  He is not toxic-appearing  Interventions: He is not intubated  HENT:      Head: Normocephalic and atraumatic  Right Ear: External ear normal       Left Ear: External ear normal       Nose: Nose normal       Mouth/Throat:      Mouth: Mucous membranes are moist       Pharynx: Oropharynx is clear  Eyes:      General: No scleral icterus  Conjunctiva/sclera: Conjunctivae normal    Cardiovascular:      Rate and Rhythm: Normal rate and regular rhythm  Pulses: Normal pulses  Heart sounds: Normal heart sounds  No murmur heard  No friction rub  No gallop     Pulmonary:      Effort: Tachypnea, accessory muscle usage and respiratory distress present  He is not intubated  Breath sounds: Decreased air movement present  Decreased breath sounds present  No wheezing, rhonchi or rales  Abdominal:      General: Abdomen is flat  Bowel sounds are normal       Palpations: Abdomen is soft  Musculoskeletal:         General: No swelling or tenderness  Cervical back: Neck supple  No tenderness  Skin:     General: Skin is warm and dry  Neurological:      General: No focal deficit present  Mental Status: He is alert and oriented to person, place, and time  Mental status is at baseline  LABORATORY DATA     Labs: I have personally reviewed pertinent reports  Results from last 7 days   Lab Units 06/05/23  0539 06/04/23  0454 06/03/23  0534 06/02/23  0606   EOS PCT % 0 0  --  0   HEMATOCRIT % 32 5* 32 8* 33 9* 33 5*   HEMOGLOBIN g/dL 10 8* 11 0* 11 3* 10 6*   MONOS PCT % 7 9  --  22*   NEUTROS PCT % 89* 85*  --  68   PLATELETS Thousands/uL 175 186 178 155   WBC Thousand/uL 8 15 5 19 3 07* 3 96*      Results from last 7 days   Lab Units 06/06/23  0548 06/05/23  1602 06/05/23  0820   BUN mg/dL 24 25 23   CALCIUM mg/dL 8 9 8 1* 8 6   CHLORIDE mmol/L 81* 80* 83*   CO2 mmol/L >45* 43* 42*   CREATININE mg/dL 0 79 0 77 0 86   POTASSIUM mmol/L 4 0 3 8 3 8     Results from last 7 days   Lab Units 06/05/23  0539 06/04/23  0454 06/02/23  1340   MAGNESIUM mg/dL 1 6 1 8 2 0     Results from last 7 days   Lab Units 06/05/23  0539 06/04/23  0454 06/02/23  1340   PHOSPHORUS mg/dL 2 9 3 0 3 6      Results from last 7 days   Lab Units 06/01/23  1245   INR  0 84   PTT seconds 26     Results from last 7 days   Lab Units 06/01/23  1245   LACTIC ACID mmol/L 1 7             ABG:       Micro:   Results from last 7 days   Lab Units 06/02/23  1120 06/01/23  1245   BLOOD CULTURE   --  No Growth After 4 Days  No Growth After 4 Days     MRSA CULTURE ONLY  Methicillin Resistant Staphylococcus aureus isolated*  --          IMAGING & DIAGNOSTIC TESTING "    Radiology Results: I have personally reviewed pertinent reports  XR chest 1 view portable    Result Date: 6/2/2023  Impression: 1  Mild right basilar airspace opacity concerning for pneumonia  2   Background emphysematous changes  Interpretation concordant with preliminary findings from the emergency department  Workstation performed: [id number]     Other Diagnostic Testing: I have personally reviewed pertinent reports  ACTIVE MEDICATIONS     Current Facility-Administered Medications   Medication Dose Route Frequency   • acetaminophen (TYLENOL) tablet 975 mg  975 mg Oral Q8H PRN   • aspirin (ECOTRIN LOW STRENGTH) EC tablet 81 mg  81 mg Oral Daily   • atorvastatin (LIPITOR) tablet 40 mg  40 mg Oral Daily With Dinner   • budesonide (PULMICORT) inhalation solution 0 5 mg  0 5 mg Nebulization Daily   • chlorhexidine (PERIDEX) 0 12 % oral rinse 15 mL  15 mL Mouth/Throat Q12H GABE   • docusate sodium (COLACE) capsule 100 mg  100 mg Oral BID   • doxycycline hyclate (VIBRAMYCIN) capsule 100 mg  100 mg Oral Q12H [facility]   • enoxaparin (LOVENOX) subcutaneous injection 40 mg  40 mg Subcutaneous Daily   • formoterol (PERFOROMIST) nebulizer solution 20 mcg  20 mcg Nebulization Q12H   • guaiFENesin (MUCINEX) 12 hr tablet 1,200 mg  1,200 mg Oral Q12H GABE   • ipratropium (ATROVENT) 0 02 % inhalation solution 0 5 mg  0 5 mg Nebulization TID   • levalbuterol (XOPENEX) inhalation solution 1 25 mg  1 25 mg Nebulization TID   • melatonin tablet 3 mg  3 mg Oral HS   • methylPREDNISolone sodium succinate (Solu-MEDROL) injection 40 mg  40 mg Intravenous Q12H GABE   • sodium chloride (OCEAN) 0 65 % nasal spray 1 spray  1 spray Each Nare Q1H PRN   • tamsulosin (FLOMAX) capsule 0 4 mg  0 4 mg Oral Daily With Dinner   • torsemide (DEMADEX) tablet 20 mg  20 mg Oral Daily         Disclaimer: Portions of the record may have been created with voice recognition software   Occasional wrong word or \"sound a like\" substitutions may have occurred " due to the inherent limitations of voice recognition software  Careful consideration should be taken to recognize, using context, where substitutions have occurred      Coco Santos,    Pulmonary and Critical Care Fellow, PGY-V  [facility]

## 2023-06-06 NOTE — PROGRESS NOTES
Vancomycin IV Pharmacy-to-Dose Consultation    Aditi Stein  is a 79 y o  male who was receiving Vancomycin IV with management by the Pharmacy Consult service for treatment of Pneumonia (goal -600, trough >10)    The patient’s Vancomycin therapy has been completed / discontinued  Thank you for allowing us to take part in this patient's care  Pharmacy will sign-off now; please call or re-consult if there are any questions  Kylie STARKEY  Ph

## 2023-06-06 NOTE — ASSESSMENT & PLAN NOTE
-CXR (reviewed by me) 6/1/23: Mild right basilar airspace opacity concerning for pneumonia  Background emphysematous changes     -pt was on Vancomycin/cefepime, now on Doxy (MRSA POS nares)  -BCxs NGTD  -pulm following  -Speech therapy consulted, no change in diet recommended

## 2023-06-06 NOTE — ASSESSMENT & PLAN NOTE
· Currently with acute exacerbation  · Pt reports he smoked 2-3ppd for approx 45 years  · History of severe COPD with multiple admissions for COPD exacerbation  · PFT on 9/16/2020 showed FEV1/FVC 30%, FVC 59% predicted and FEV1 22% predicted  · Presented with respiratory distress requiring BiPAP  · Currently on high flow (50L/47%) at daytime and BiPAP at night  · Continue Pulmicort, formoterol, Atrovent, Xopenex  · Continue Solu-Medrol 40 mg every 12 hours  Adjust as appropriate  · Monitor daily vitals  · Pulmonology following  · Patient has end-stage COPD and has been seen by palliative care  He continues to want aggressive measures and to remain full code

## 2023-06-06 NOTE — PROGRESS NOTES
Progress note - Palliative and Supportive Care   Santosh Pate  79 y o  male [id number]    Patient Active Problem List   Diagnosis   • Nonobstructive atherosclerosis of coronary artery   • Nonischemic cardiomyopathy (HCC)   • Chronic obstructive pulmonary disease (HCC)   • Left bundle-branch block   • KEVIN and COPD overlap syndrome (HCC)   • Gastroesophageal reflux disease   • Impaired fasting glucose   • Osteoarthritis   • Osteoporosis   • Vitamin D deficiency   • Mood disorder (HCC)   • Other insomnia   • Macrocytosis without anemia   • Chronic respiratory failure with hypoxia and hypercapnia ([facility])   • Goals of care, counseling/discussion   • BPH with obstruction/lower urinary tract symptoms   • Prostate cancer ([facility] )   • Pulmonary nodules/lesions, multiple   • Protein-calorie malnutrition (HCC)   • Chronic HFrEF (heart failure with reduced ejection fraction) ([facility])   • Anemia   • Hyponatremia   • Physical deconditioning   • Hyperglycemia   • COPD exacerbation (HCC)   • HLD (hyperlipidemia)   • Moderate protein-calorie malnutrition (HCC)   • Acute exacerbation of congestive heart failure (HCC)   • Pneumonia     Active issues specifically addressed today include:   COPD exacerbation  Chronic respiratory failure with hypoxia and hypercapnia  Chronic HFrEF  [facility]  Goals of care discussion      Plan:  1  Symptom management -   #Pain - maintained on Acetaminophen   -will re-evaluate should pain worsen    # Psychosocial    -Support provided    2) Goals of Care / Advanced Care Planning     Code Status: Full Code  - Level 1   -Patient still endorses difficulties with breathing, however, reconfirms Full code status  Patient denies any need for escalation of medications for pain at this time  On review, patient has only required acetaminophen for his pain  Will hold of escalation at this time     -pending his clinical course from a respiratory standpoint, will continue to follow      Decisional apparatus:  Patient is competent on my exam today  If competence is lost, patient's substitute decision maker would default to wife [place]  Advance Directive / Living Will / POLST:  None on file    [address] [facility] DO Shayla  Palliative and Supportive Care  Clinic/Answering Service: [phone]  You can find me on TigerConnect  Chief Complaint  Chief Complaint   Patient presents with   • Altered Mental Status     With sob as of 0930  Evaluated by vascular and sent to ED  Hx CHF COPD MI       Subjective:  Patient seen and examined with increased work of breathing requiring supplemental oxygen  He states his breathing is still labored and denies any change from yesterday  Admits to foot pain but states the pain is improved when he gets his pain medication (acetaminophen)  Denies any need for further escalation of his pain regimen  Despite his ongoing issues, he appears to be competent on my exam  Patient wishing full resuscitative efforts  Review of Systems   Constitutional: Negative for fever  Cardiovascular: Negative for chest pain, leg swelling and palpitations  Respiratory: Positive for shortness of breath  Negative for cough  Skin: Negative  Musculoskeletal: Negative for joint swelling and myalgias  Admits to right foot pain    Gastrointestinal: Negative for abdominal pain, nausea and vomiting  Genitourinary: Negative for dysuria  Neurological: Negative for headaches  Psychiatric/Behavioral: The patient does not have insomnia            MEDICATIONS / ALLERGIES:     all current active meds have been reviewed, current meds:   Current Facility-Administered Medications   Medication Dose Route Frequency   • acetaminophen (TYLENOL) tablet 975 mg  975 mg Oral Q8H PRN   • aspirin (ECOTRIN LOW STRENGTH) EC tablet 81 mg  81 mg Oral Daily   • atorvastatin (LIPITOR) tablet 40 mg  40 mg Oral Daily With Dinner   • budesonide (PULMICORT) inhalation solution 0 5 mg  0 5 mg Nebulization Daily   • chlorhexidine (PERIDEX) 0 12 % oral rinse 15 mL  15 mL Mouth/Throat Q12H [facility]   • docusate sodium (COLACE) capsule 100 mg  100 mg Oral BID   • doxycycline hyclate (VIBRAMYCIN) capsule 100 mg  100 mg Oral Q12H [facility]   • enoxaparin (LOVENOX) subcutaneous injection 40 mg  40 mg Subcutaneous Daily   • formoterol (PERFOROMIST) nebulizer solution 20 mcg  20 mcg Nebulization Q12H   • guaiFENesin (MUCINEX) 12 hr tablet 1,200 mg  1,200 mg Oral Q12H GABE   • ipratropium (ATROVENT) 0 02 % inhalation solution 0 5 mg  0 5 mg Nebulization TID   • levalbuterol (XOPENEX) inhalation solution 1 25 mg  1 25 mg Nebulization TID   • melatonin tablet 3 mg  3 mg Oral HS   • methylPREDNISolone sodium succinate (Solu-MEDROL) injection 40 mg  40 mg Intravenous Q12H [facility]   • sodium chloride (OCEAN) 0 65 % nasal spray 1 spray  1 spray Each Nare Q1H PRN   • tamsulosin (FLOMAX) capsule 0 4 mg  0 4 mg Oral Daily With Dinner   • torsemide (DEMADEX) tablet 20 mg  20 mg Oral Daily    and PTA meds:   Prior to Admission Medications   Prescriptions Last Dose Informant Patient Reported? Taking?    Diclofenac Sodium (VOLTAREN) 1 %  Spouse/Significant Other, Self Yes No   Sig: APPLY 2 GRAMS 4 TIMES A DAY   Melatonin 5 MG TABS  Spouse/Significant Other, Self Yes No   Sig: Take 1 tablet by mouth daily at bedtime   acetaminophen (TYLENOL) 325 mg tablet  Spouse/Significant Other, Self No No   Sig: Take 3 tablets (975 mg total) by mouth every 8 (eight) hours as needed for mild pain or moderate pain   albuterol (PROVENTIL HFA,VENTOLIN HFA) 90 mcg/act inhaler  Spouse/Significant Other, Self No No   Sig: Inhale 2 puffs every 6 (six) hours as needed for wheezing   aspirin 81 mg chewable tablet  Self, Spouse/Significant Other No No   Sig: Chew 1 tablet (81 mg total) daily   azithromycin (ZITHROMAX) 250 mg tablet  Self, Spouse/Significant Other No No   Sig: Take 1 tablet (250 mg total) by mouth 3 (three) times a week   budesonide (PULMICORT) 0 5 mg/2 mL nebulizer solution  Self, Spouse/Significant Other No No   Sig: TAKE 2 ML (0 5 MG TOTAL) BY NEBULIZATION TWICE A DAY RINSE MOUTH AFTER USE   cyanocobalamin (VITAMIN B-12) 1000 MCG tablet  Self, Spouse/Significant Other No No   Sig: Take 1 tablet (1,000 mcg total) by mouth daily   docusate sodium (COLACE) 100 mg capsule  Spouse/Significant Other, Self Yes No   Sig: Take 100 mg by mouth 2 (two) times a day   ergocalciferol (VITAMIN D2) 50,000 units  Self, Spouse/Significant Other No No   Sig: TAKE 1 CAPSULE (50,000 UNITS TOTAL) BY MOUTH EVERY 28 DAYS   formoterol (PERFOROMIST) 20 MCG/2ML nebulizer solution  Self, Spouse/Significant Other Yes No   Sig: TAKE 2 ML (20 MCG TOTAL) BY NEBULIZATION 2 (TWO) TIMES A DAY   furosemide (LASIX) 20 mg tablet   Yes No   Sig: Take 10 mg by mouth daily   guaiFENesin (MUCINEX) 600 mg 12 hr tablet  Self, Spouse/Significant Other No No   Sig: Take 2 tablets (1,200 mg total) by mouth every 12 (twelve) hours   ipratropium (ATROVENT) 0 02 % nebulizer solution  Self, Spouse/Significant Other No No   Sig: Take 2 5 mL (0 5 mg total) by nebulization 3 (three) times a day   ipratropium-albuterol (DUO-NEB) 0 5-2 5 mg/3 mL nebulizer solution   No No   Sig: USE 1 VIAL VIA NEBULIZER 3 TIMES A DAY   levalbuterol (XOPENEX) 1 25 mg/0 5 mL nebulizer solution  Spouse/Significant Other, Self No No   Sig: Take 0 5 mL (1 25 mg total) by nebulization 3 (three) times a day   predniSONE 5 mg tablet  Spouse/Significant Other, Self No No   Sig: Take 1 tablet (5 mg total) by mouth daily Do not start before March 5, 2023     rosuvastatin (CRESTOR) 10 MG tablet  Spouse/Significant Other, Self No No   Sig: Take 1 tablet (10 mg total) by mouth daily   senna-docusate sodium (SENOKOT S) 8 6-50 mg per tablet  Spouse/Significant Other, Self No No   Sig: Take 1 tablet by mouth daily at bedtime   Patient not taking: Reported on 6/1/2023   tamsulosin (FLOMAX) 0 4 mg  Spouse/Significant Other, Self No No   Sig: Take 1 capsule (0 4 mg total) by mouth "daily with dinner   torsemide (DEMADEX) 20 mg tablet  Self, Spouse/Significant Other No No   Sig: Take 1 tablet (20 mg total) by mouth daily      Facility-Administered Medications: None       No Known Allergies    OBJECTIVE:    Physical Exam  Physical Exam  Vitals reviewed  Constitutional:       Appearance: He is ill-appearing  He is not toxic-appearing or diaphoretic  HENT:      Head: Normocephalic and atraumatic  Mouth/Throat:      Pharynx: Oropharynx is clear  Cardiovascular:      Rate and Rhythm: Normal rate  Pulmonary:      Comments: Requiring supplemental oxygen  Barrel chest   Abdominal:      General: There is no distension  Palpations: Abdomen is soft  Musculoskeletal:         General: No swelling  Skin:     General: Skin is warm and dry  Coloration: Skin is pale  Skin is not cyanotic  Neurological:      Mental Status: He is alert and oriented to person, place, and time  Psychiatric:         Mood and Affect: Mood normal  Mood is not anxious  Speech: Speech normal          Behavior: Behavior normal  Behavior is not agitated  Cognition and Memory: Memory is not impaired  Lab Results:   I have personally reviewed pertinent labs  , CBC: No results found for: \"ADJUSTEDWBC\", \"HCT\", \"HGB\", \"MCH\", \"MCHC\", \"MCV\", \"MPV\", \"NRBC\", \"PLT\", \"RBC\", \"RDW\", \"WBC\", CMP:   Lab Results   Component Value Date    BUN 24 06/06/2023    CALCIUM 8 9 06/06/2023    CL 81 (L) 06/06/2023    CO2 >45 (HH) 06/06/2023    CREATININE 0 79 06/06/2023    EGFR 93 06/06/2023    K 4 0 06/06/2023    SODIUM 127 (L) 06/06/2023   , BMP:  Lab Results   Component Value Date    AGAP  06/06/2023      Comment:      Unable to calculate    BUN 24 06/06/2023    CALCIUM 8 9 06/06/2023    CL 81 (L) 06/06/2023    CO2 >45 (HH) 06/06/2023    CREATININE 0 79 06/06/2023    EGFR 93 06/06/2023    GLUC 159 (H) 06/06/2023    K 4 0 06/06/2023    SODIUM 127 (L) 06/06/2023   , PT/PTT:No results found for: \"PT\", " "\"PTT\"  Imaging Studies: Reviewed  EKG, Pathology, and Other Studies: Reviewed    Counseling / Coordination of Care  Total floor / unit time spent today 20 minutes  Greater than 50% of total time was spent with the patient and / or family counseling and / or coordination of care  A description of the counseling / coordination of care: goals of care    This note was shared with the patient  Jean Acevedo DO  Hospice and Palliative Care Fellow  Palliative & Supportive Care  Clinic/Answering Service: [phone]  You can find me on [employer]Connect  Portions of this document may have been created using dictation software and as such some \"sound alike\" terms may have been generated by the system  Do not hesitate to contact me with any questions or clarifications      "

## 2023-06-06 NOTE — ASSESSMENT & PLAN NOTE
Wt Readings from Last 3 Encounters:   06/06/23 47 2 kg (104 lb)   06/01/23 46 7 kg (103 lb)   05/19/23 46 8 kg (103 lb 2 8 oz)     · Echo on 5/1/2023 - LVEF 20%, severe global hypokinesis, abnormal mildly septal motion consistent with left bundle branch block and diastolic flattening of interventricular septum consistent with right ventricule volume overload  ·   · S/p 2 doses of IV Lasix 40 mg  · Continue home torsemide 20 mg daily  · Daily weights and strict ins and outs

## 2023-06-06 NOTE — QUICK NOTE
Podiatry Quick Note:  - R foot XR reviewed: no acute osseous abnormality noted  - Continue with strict use of prevalon boots when non-ambulatory for pressure and friction reduction    - Podiatry signing off at this time

## 2023-06-06 NOTE — RESPIRATORY THERAPY NOTE
Resp care   06/06/23 0726   Inhalation Therapy Tx   $ Inhalation Therapy Performed Yes   $ Pulse Oximetry Spot Check Charge Completed   SpO2 93 %   Pre-Treatment Pulse 89   Pre-Treatment Respirations 20   Duration 30   Breath Sounds Pre-Treatment Bilateral Expiratory wheeze; Inspiratory wheeze   Post-Treatment Pulse 90   Post-Treatment Respirations 18   Delivery Source [employer]   Position Semi [employer]   Treatment Tolerance Tolerated well   Resp Comments pt found on HFNC 50% 50L flow, spo2 is 93%, bs are inspiratory/exp wheeze, udn tx given, will continue to monitor per resp protocol

## 2023-06-06 NOTE — PROGRESS NOTES
[address] [facility]  Progress Note  Name: Elnoria Cranker  MRN: [id number]  Unit/Bed#: ICCU 205-01 I Date of Admission: 6/1/2023   Date of Service: 6/6/2023 I Hospital Day: 5    Assessment/Plan   Pneumonia  Assessment & Plan  -CXR (reviewed by me) 6/1/23: Mild right basilar airspace opacity concerning for pneumonia  Background emphysematous changes  -pt was on Vancomycin/cefepime, now on Doxy (MRSA POS nares)  -BCxs NGTD  -pulm following  -Speech therapy consulted, no change in diet recommended    Acute exacerbation of congestive heart failure (HCC)  Assessment & Plan  Wt Readings from Last 3 Encounters:   06/06/23 47 2 kg (104 lb)   06/01/23 46 7 kg (103 lb)   05/19/23 46 8 kg (103 lb 2 8 oz)     · Echo on 5/1/2023 - LVEF 20%, severe global hypokinesis, abnormal mildly septal motion consistent with left bundle branch block and diastolic flattening of interventricular septum consistent with right ventricule volume overload  ·   · S/p 2 doses of IV Lasix 40 mg  · Continue home torsemide 20 mg daily  · Daily weights and strict ins and outs  * Chronic obstructive pulmonary disease (HCC)  Assessment & Plan  · Currently with acute exacerbation  · Pt reports he smoked 2-3ppd for approx 45 years  · History of severe COPD with multiple admissions for COPD exacerbation  · PFT on 9/16/2020 showed FEV1/FVC 30%, FVC 59% predicted and FEV1 22% predicted  · Presented with respiratory distress requiring BiPAP  · Currently on high flow (50L/47%) at daytime and BiPAP at night  · Continue Pulmicort, formoterol, Atrovent, Xopenex  · Continue Solu-Medrol 40 mg every 12 hours  Adjust as appropriate  · Monitor daily vitals  · Pulmonology following  · Patient has end-stage COPD and has been seen by palliative care  He continues to want aggressive measures and to remain full code      Hyponatremia  Assessment & Plan  -chronic hyponatremia  -Currently Na 127  -Appreciate nephrology  -Continue increased solute intake, fluid restriction, diuretics  [facility]  Osteoporosis  Assessment & Plan  · DEXA scan on 2019 showed osteoporosis  · Not on any medications  Previously on bisphosphonate  · Follow-up outpatient with PCP  KEVIN and COPD overlap syndrome (HCC)  Assessment & Plan  · Continue BiPAP at night    Anemia  Assessment & Plan  · History of chronic anemia  · No active sign of bleeding  · Trend CBC  · B12 at home  Resume as needed  · Transfuse if hemoglobin less than 7  VTE Pharmacologic Prophylaxis:   Lovenox    Patient Centered Rounds: I performed bedside rounds with nursing staff today  Discussions with Specialists or Other Care Team Provider: Pulm    Total Time Spent on Date of Encounter in care of patient: 45 minutes This time was spent on one or more of the following: performing physical exam; counseling and coordination of care; obtaining or reviewing history; documenting in the medical record; reviewing/ordering tests, medications or procedures; communicating with other healthcare professionals and discussing with patient's family/caregivers  Current Length of Stay: 5 day(s)  Current Patient Status: Inpatient   Certification Statement: The patient will continue to require additional inpatient hospital stay due to end stage COPD  Discharge Plan: Anticipate discharge in >72 hrs to rehab facility  Code Status: Level 1 - Full Code    Subjective:   Pt reports waxing and waning SOB, currently moderate  Objective:     Vitals:   Temp (24hrs), Av 6 °F (36 4 °C), Min:97 1 °F (36 2 °C), Max:98 1 °F (36 7 °C)    Temp:  [97 1 °F (36 2 °C)-98 1 °F (36 7 °C)] 98 1 °F (36 7 °C)  HR:  [80-90] 86  Resp:  [20-23] 23  BP: ()/(59-66) 98/60  SpO2:  [92 %-100 %] 93 %  Body mass index is 20 31 kg/m²  Input and Output Summary (last 24 hours):      Intake/Output Summary (Last 24 hours) at 2023 1100  Last data filed at 2023 1030  Gross per 24 hour   Intake 680 ml   Output 1700 ml   Net -1020 ml       Physical Exam:   Gen: appears in moderate respiratory distress, using accessory muscles to breathe, AAOx3, appears chronically ill, cachectic, malnourished  Eyes: EOMI, PERRLA, no scleral icterus  ENMT:  no nasal discharge, no otic discharge, moist mucous membranes  Neck:  Supple  Cardiovascular:  Regular rate and rhythm, normal S1-S2, no murmurs, rubs, or gallops  Lungs:  Dec AE and mild wheezes B/L  Abdomen:  Positive bowel sounds, soft, nontender, nondistended   Skin:  Intact, no obvious lesions or rashes  Neuro: Cranial nerves 2-12 are intact, non-focal      Additional Data:     Labs:  Results from last 7 days   Lab Units 06/05/23  0539   EOS PCT % 0   HEMATOCRIT % 32 5*   HEMOGLOBIN g/dL 10 8*   LYMPHS PCT % 4*   MONOS PCT % 7   NEUTROS PCT % 89*   PLATELETS Thousands/uL 175   WBC Thousand/uL 8 15     Results from last 7 days   Lab Units 06/06/23  0548 06/05/23  1602   ANION GAP mmol/L  --  2*   BUN mg/dL 24 25   CALCIUM mg/dL 8 9 8 1*   CHLORIDE mmol/L 81* 80*   CO2 mmol/L >45* 43*   CREATININE mg/dL 0 79 0 77   GLUCOSE RANDOM mg/dL 159* 180*   POTASSIUM mmol/L 4 0 3 8   SODIUM mmol/L 127* 125*     Results from last 7 days   Lab Units 06/01/23  1245   INR  0 84             Results from last 7 days   Lab Units 06/03/23  0534 06/02/23  0606 06/01/23  1245 06/01/23  1220   LACTIC ACID mmol/L  --   --  1 7  --    PROCALCITONIN ng/ml 0 22 0 27*  --  0 07       Lines/Drains:  Invasive Devices     Peripheral Intravenous Line  Duration           Peripheral IV 06/05/23 Left;Proximal;Ventral (anterior) Forearm 1 day          Drain  Duration           External Urinary Catheter Small 4 days                Recent Cultures (last 7 days):   Results from last 7 days   Lab Units 06/01/23  1245   BLOOD CULTURE  No Growth After 4 Days  No Growth After 4 Days         Last 24 Hours Medication List:   Current Facility-Administered Medications   Medication Dose Route Frequency Provider Last Rate   • acetaminophen  975 mg Oral Q8H PRN Carol Núñez PA-C     • aspirin  81 mg Oral Daily Sergio Ayala MD     • atorvastatin  40 mg Oral Daily With Dinner Carol Núñez PA-C     • budesonide  0 5 mg Nebulization Daily Carol Núñez PA-C     • chlorhexidine  15 mL Mouth/Throat Q12H [address] Carol Núñez PA-C     • docusate sodium  100 mg Oral BID Carol Núñez PA-C     • doxycycline hyclate  100 mg Oral Q12H [address] Idalia Grimaldo MD     • enoxaparin  40 mg Subcutaneous Daily Carol Núñez PA-C     • formoterol  20 mcg Nebulization Q12H Carol Núñez PA-C     • guaiFENesin  1,200 mg Oral Q12H [address] Carol Núñez PA-C     • ipratropium  0 5 mg Nebulization TID Carol Núñez PA-C     • levalbuterol  1 25 mg Nebulization TID Carol Núñez PA-C     • melatonin  3 mg Oral HS Carol Núñez PA-C     • methylPREDNISolone sodium succinate  40 mg Intravenous Q12H [address] Ang Lestine Najjar, DO     • sodium chloride  1 spray Each Nare Q1H PRN Gary Rodriguez MD     • tamsulosin  0 4 mg Oral Daily With Dinner Carol Núñez PA-C     • tolvaptan  7 5 mg Oral Once Alanis Baig DO     • torsemide  20 mg Oral Daily Olga Rush DO          Today, Patient Was Seen By: Idalia Grimaldo MD    **Please Note: This note may have been constructed using a voice recognition system  **

## 2023-06-06 NOTE — CONSULTS
Vancomycin IV Pharmacy-to-Dose Consultation    Syl Adame  is a 79 y o  male who was receiving Vancomycin IV with management by the Pharmacy Consult service for treatment of Pneumonia (goal -600, trough >10)    The patient’s Vancomycin therapy has been completed / discontinued  Thank you for allowing us to take part in this patient's care  Pharmacy will sign-off now; please call or re-consult if there are any questions  Kylie STARKEY  Ph

## 2023-06-06 NOTE — CASE MANAGEMENT
Case Management Discharge Planning Note    Patient name Tamara Chicas    [facility]  MRN [id number]  : 1955 Date 2023       Current Admission Date: 2023  Current Admission Diagnosis:Chronic obstructive pulmonary disease [facility])   Patient Active Problem List    Diagnosis Date Noted   • Pneumonia 2023   • Acute exacerbation of congestive heart failure ([facility] ) 2023   • Moderate protein-calorie malnutrition ([facility] ) 2023   • HLD (hyperlipidemia) 2023   • COPD exacerbation ([place] [address] ) 2023   • Hyperglycemia 2023   • Physical deconditioning 2023   • Anemia 2023   • Hyponatremia 2023   • Chronic HFrEF (heart failure with reduced ejection fraction) ([place] [address] ) 2022   • Protein-calorie malnutrition ([place] [address] ) 2022   • Pulmonary nodules/lesions, multiple 2022   • Prostate cancer ([place] [address] ) 2021   • BPH with obstruction/lower urinary tract symptoms 2021   • Goals of care, counseling/discussion 2021   • Chronic respiratory failure with hypoxia and hypercapnia ([place] [address] ) 2020   • Macrocytosis without anemia 2019   • Other insomnia 2019   • Gastroesophageal reflux disease 2017   • Nonobstructive atherosclerosis of coronary artery 2016   • Mood disorder ([place] [address] ) 2015   • Impaired fasting glucose 2015   • Osteoporosis 10/14/2014   • Vitamin D deficiency 10/14/2014   • Nonischemic cardiomyopathy ([place] [address] ) 2014   • Left bundle-branch block 2013   • Osteoarthritis 2013   • KEVIN and COPD overlap syndrome ([place] [address] ) 2013   • Chronic obstructive pulmonary disease ([facility] ) 2012      LOS (days): 5  Geometric Mean LOS (GMLOS) (days): 4 00  Days to GMLOS:-0 8     OBJECTIVE:  Risk of Unplanned Readmission Score: 38 01         Current admission status: Inpatient   Preferred Pharmacy:   [facility]acy #[id number]  [facility] [address]  Phone: [phone] Fax: [phone]    Primary Care Provider: Scar Flores DO    Primary Insurance: MEDICARE  Secondary Insurance: BLUE CROSS    DISCHARGE DETAILS:    CM met with pt to discuss d/c planning  Pt lives with his family in a 1 story home which has 3STE  Pt was seen by OT/PT and recommended for IP rehab vs home with progress and support  Pt's preference is to return home with [facility]  Pt doesn't wish to attempt rehab  Pt aware of inherent risks of a home d/c when recommended for IP rehab

## 2023-06-06 NOTE — RESPIRATORY THERAPY NOTE
06/06/23 4779   Respiratory Assessment   Resp Comments Called due to pt needing breathing treatment  Upon my arrival, pt tachypniec, breathing shallow, sat 84%  Adjusted HFNC without improvement  Pt willing to go on BIPAP   Placed pt on BIPAP   O2 Device bipap   Non-Invasive Information   O2 Interface Device Face mask   Non-Invasive Ventilation Mode BiPAP   SpO2 91 %   Non-Invasive Settings   IPAP (cm) 22 cm   EPAP (cm) 10 cm   Rate (Set) 14   FiO2 (%) 65   Rise Time 3   Inspiratory Time (Set) 1   Temperature (Set) 31   Non-Invasive Readings   Skin Intervention Skin intact   Total Rate 32   Vt (mL) (Mech) 547   MV (Mech) 17 6   Peak Pressure (Obs) 23   Heater Temperature (Obs) 31   Leak (lpm) 31   Non-Invasive Alarms   Insp Pressure High (cm H20) 35   Insp Pressure Low (cm H20) 5   Low Insp Pressure Time (sec) 20 sec   MV Low (L/min) 3   Vt High (mL) 1000   Vt Low (mL) 250   High Resp Rate (BPM) 45 BPM   Low Resp Rate (BPM) 8 BPM   Apnea Interval (sec) 20   Maintenance   Water bag changed No

## 2023-06-06 NOTE — PROGRESS NOTES
NEPHROLOGY PROGRESS NOTE   Eugenia Pino  79 y o  male MRN: [id number]  Unit/Bed#: [facility] 205-01 Encounter: [id number]        ASSESSMENT & PLAN    1  Acute on chronic hyponatremia  • This is in the setting of low output congestive heart failure and prostate cancer which likely has high ADH release his sodium level is slowly rising maintained on oral torsemide and fluid restriction  • His urine studies show a high urine osmolality and a urine sodium of 33  • We will give him tolvaptan today 7 5 mg will increase the fluid restriction to 1 8 L we will repeat a BMP this afternoon and make further recommendations  2  Acute on chronic hypoxic respiratory failure  • Pulmonology seeing the patient less likely pneumonia, patient was treated for a COPD exacerbation, patient states still having difficulties breathing  3  Congestive heart failure with a reduced ejection fraction  • Continue diuresis, adding tolvaptan, monitor ins and outs and daily weights  • Making more urine, but bed scale weight did increase  • Chest x-ray shows mild right basilar airspace opacity and background emphysematous changes  • Would consider checking an echocardiogram  • Continue negative fluid balance  4  Frailty and cachexia  • Continue nutritional optimization    DISCUSSION:    Tolvaptan 7 5 mg once  Uptitrate fluid restriction  Repeat BMP this afternoon    SUBJECTIVE:    Patient was seen today  Still states work of breathing difficult  No chest pain  No nausea vomiting      12 point review of systems was otherwise negative besides what is mentioned above      Medications:    Current Facility-Administered Medications:   •  acetaminophen (TYLENOL) tablet 975 mg, 975 mg, Oral, Q8H PRN, Ang Shaan Servant, DO, 975 mg at 06/05/23 6263  •  aspirin (ECOTRIN LOW STRENGTH) EC tablet 81 mg, 81 mg, Oral, Daily, Ang Shaan Servant, DO, 81 mg at 06/06/23 0840  •  atorvastatin (LIPITOR) tablet 40 mg, 40 mg, Oral, Daily With Dinner, Ang Shaan Servant, DO, 40 mg at 06/05/23 1612  •  budesonide (PULMICORT) inhalation solution 0 5 mg, 0 5 mg, Nebulization, Daily, Ang Lestine Najjar, DO, 0 5 mg at 06/06/23 5489  •  chlorhexidine (PERIDEX) 0 12 % oral rinse 15 mL, 15 mL, Mouth/Throat, Q12H [address], Ang Lestine Najjar, DO, 15 mL at 06/06/23 0840  •  docusate sodium (COLACE) capsule 100 mg, 100 mg, Oral, BID, Ang Lestine Najjar, DO, 100 mg at 06/06/23 0840  •  doxycycline hyclate (VIBRAMYCIN) capsule 100 mg, 100 mg, Oral, Q12H [address], Ang Lestine Najjar, DO, 100 mg at 06/06/23 0840  •  enoxaparin (LOVENOX) subcutaneous injection 40 mg, 40 mg, Subcutaneous, Daily, Ang Lestine Najjar, DO, 40 mg at 06/06/23 0840  •  formoterol (PERFOROMIST) nebulizer solution 20 mcg, 20 mcg, Nebulization, Q12H, Ang Lestine Najjar, DO, 20 mcg at 06/06/23 0725  •  guaiFENesin (MUCINEX) 12 hr tablet 1,200 mg, 1,200 mg, Oral, Q12H [address], Ang Lestine Najjar, DO, 1,200 mg at 06/06/23 0840  •  ipratropium (ATROVENT) 0 02 % inhalation solution 0 5 mg, 0 5 mg, Nebulization, TID, Ang Lestine Najjar, DO, 0 5 mg at 06/06/23 1584  •  levalbuterol (XOPENEX) inhalation solution 1 25 mg, 1 25 mg, Nebulization, TID, Ang Lestine Najjar, DO, 1 25 mg at 06/06/23 0725  •  melatonin tablet 3 mg, 3 mg, Oral, HS, Ang Lestine Najjar, DO, 3 mg at 06/05/23 2121  •  methylPREDNISolone sodium succinate (Solu-MEDROL) injection 40 mg, 40 mg, Intravenous, Q12H [address], Ang Lestine Najjar, DO, 40 mg at 06/06/23 0840  •  sodium chloride (OCEAN) 0 65 % nasal spray 1 spray, 1 spray, Each Nare, Q1H PRN, Juan Carlos Mooretine Najjar, DO, 1 spray at 06/04/23 0654  •  tamsulosin (FLOMAX) capsule 0 4 mg, 0 4 mg, Oral, Daily With Dinner, Ang Lestine Najjar, DO, 0 4 mg at 06/05/23 1611  •  torsemide (DEMADEX) tablet 20 mg, 20 mg, Oral, Daily, Juan Carlos Linaresr, DO, 20 mg at 06/06/23 0840    OBJECTIVE:    Vitals:    06/06/23 0726 06/06/23 0730 06/06/23 0829 06/06/23 1210   BP:  98/60  102/58   BP Location:       Pulse:  86  80   Resp:    (!) 40   Temp:   98 1 °F (36 7 °C)    TempSrc: SpO2: 93% 93%  (!) 89%   Weight:       Height:            Temp:  [97 1 °F (36 2 °C)-98 1 °F (36 7 °C)] 98 1 °F (36 7 °C)  HR:  [80-90] 80  Resp:  [20-40] 40  BP: ()/(58-65) 102/58  SpO2:  [89 %-100 %] 89 %     Body mass index is 20 31 kg/m²  Weight (last 2 days)     Date/Time Weight    06/06/23 0600 47 2 (104)    06/05/23 0553 46 (101 4)    06/04/23 0600 45 9 (101 3)          I/O last 3 completed shifts: In: 560 [P O :560]  Out: 1750 [Urine:1750]    I/O this shift:  In: 120 [P O :120]  Out: 250 [Urine:250]      Physical exam:    General: Frail elderly cachectic  Eyes: conjunctivae pink, anicteric sclerae  ENT: Decreased breath sounds  Neck: ROM intact, no JVD  Chest: No respiratory distress, no accessory muscle use  CVS: normal rate, non pericardial friction rub  Abdomen: soft, non-tender, non-distended, normoactive bowel sounds  Extremities: no edema of both legs  Skin: no rash  Neuro: awake, alert, oriented, grossly intact  Psych:  Pleasant affect    Invasive Devices:      Lab Results:   Results from last 7 days   Lab Units 06/06/23  0548 06/05/23  1602 06/05/23  0820 06/05/23  0539 06/05/23  0032 06/04/23  1619 06/04/23  0929 06/04/23  0454 06/03/23  1111 06/03/23  0534 06/03/23  0057 06/02/23  1340 06/02/23  0853 06/02/23  0606 06/01/23  1827 06/01/23  1704 06/01/23  1245 06/01/23  1220   BLOOD CULTURE   --   --   --   --   --   --   --   --   --   --   --   --   --   --   --   --  No Growth After 4 Days  No Growth After 4 Days    --    BUN mg/dL 24 25 23  --  24 23   < >  --    < > 25   < > 23   < > 23   < >  --   --  19   CALCIUM mg/dL 8 9 8 1* 8 6  --  8 4 8 6   < >  --    < > 9 3   < > 9 1   < > 8 5   < >  --   --  8 9   CHLORIDE mmol/L 81* 80* 83*  --  82* 79*   < >  --    < > 83*   < > 85*   < > 84*   < >  --   --  84*   CO2 mmol/L >45* 43* 42*  --  42* 44*   < >  --    < > 43*   < > 41*   < > 42*   < >  --   --  40*   CREATININE mg/dL 0 79 0 77 0 86  --  0 74 0 81   < >  --    < > 0 61   < > "0 65   < > 0 66   < >  --   --  0 67   HEMATOCRIT %  --   --   --  32 5*  --   --   --  32 8*  --  33 9*  --   --   --  33 5*  --   --   --  37 7   HEMOGLOBIN g/dL  --   --   --  10 8*  --   --   --  11 0*  --  11 3*  --   --   --  10 6*  --   --   --  11 7*   POTASSIUM mmol/L 4 0 3 8 3 8  --  3 9 3 3*   < >  --    < > 4 0   < > 4 4   < > 4 3   < >  --   --  4 5   MAGNESIUM mg/dL  --   --   --  1 6  --   --   --  1 8  --   --   --  2 0  --  2 0  --   --   --   --    PHOSPHORUS mg/dL  --   --   --  2 9  --   --   --  3 0  --   --   --  3 6  --  3 5  --   --   --   --    PLATELETS Thousands/uL  --   --   --  175  --   --   --  186  --  178  --   --   --  155  --  173  --  178   WBC Thousand/uL  --   --   --  8 15  --   --   --  5 19  --  3 07*  --   --   --  3 96*  --   --   --  8 08    < > = values in this interval not displayed  Portions of the record may have been created with voice recognition software  Occasional wrong word or \"sound a like\" substitutions may have occurred due to the inherent limitations of voice recognition software  Read the chart carefully and recognize, using context, where substitutions have occurred  If you have any questions, please contact the dictating provider      "

## 2023-06-07 LAB
BUN SERPL-MCNC: 29 MG/DL (ref 5–25)
BUN SERPL-MCNC: 32 MG/DL (ref 5–25)
CALCIUM SERPL-MCNC: 8.6 MG/DL (ref 8.3–10.1)
CALCIUM SERPL-MCNC: 9.1 MG/DL (ref 8.3–10.1)
CHLORIDE SERPL-SCNC: 85 MMOL/L (ref 96–108)
CHLORIDE SERPL-SCNC: 87 MMOL/L (ref 96–108)
CO2 SERPL-SCNC: >45 MMOL/L (ref 21–32)
CO2 SERPL-SCNC: >45 MMOL/L (ref 21–32)
CREAT SERPL-MCNC: 0.87 MG/DL (ref 0.6–1.3)
CREAT SERPL-MCNC: 0.95 MG/DL (ref 0.6–1.3)
GFR SERPL CREATININE-BSD FRML MDRD: 83 ML/MIN/1.73SQ M
GFR SERPL CREATININE-BSD FRML MDRD: 89 ML/MIN/1.73SQ M
GLUCOSE SERPL-MCNC: 119 MG/DL (ref 65–140)
GLUCOSE SERPL-MCNC: 134 MG/DL (ref 65–140)
POTASSIUM SERPL-SCNC: 3.6 MMOL/L (ref 3.5–5.3)
POTASSIUM SERPL-SCNC: 3.7 MMOL/L (ref 3.5–5.3)
SODIUM SERPL-SCNC: 132 MMOL/L (ref 135–147)
SODIUM SERPL-SCNC: 133 MMOL/L (ref 135–147)

## 2023-06-07 PROCEDURE — 94760 N-INVAS EAR/PLS OXIMETRY 1: CPT

## 2023-06-07 PROCEDURE — 99232 SBSQ HOSP IP/OBS MODERATE 35: CPT | Performed by: INTERNAL MEDICINE

## 2023-06-07 PROCEDURE — 94640 AIRWAY INHALATION TREATMENT: CPT

## 2023-06-07 PROCEDURE — 94664 DEMO&/EVAL PT USE INHALER: CPT

## 2023-06-07 PROCEDURE — 94660 CPAP INITIATION&MGMT: CPT

## 2023-06-07 PROCEDURE — 94003 VENT MGMT INPAT SUBQ DAY: CPT

## 2023-06-07 PROCEDURE — 99233 SBSQ HOSP IP/OBS HIGH 50: CPT | Performed by: HOSPITALIST

## 2023-06-07 PROCEDURE — 80048 BASIC METABOLIC PNL TOTAL CA: CPT | Performed by: INTERNAL MEDICINE

## 2023-06-07 RX ORDER — POTASSIUM CHLORIDE 20 MEQ/1
40 TABLET, EXTENDED RELEASE ORAL ONCE
Status: COMPLETED | OUTPATIENT
Start: 2023-06-07 | End: 2023-06-07

## 2023-06-07 RX ADMIN — GUAIFENESIN 1200 MG: 600 TABLET, EXTENDED RELEASE ORAL at 09:24

## 2023-06-07 RX ADMIN — ASPIRIN 81 MG: 81 TABLET, COATED ORAL at 09:21

## 2023-06-07 RX ADMIN — METHYLPREDNISOLONE SODIUM SUCCINATE 40 MG: 40 INJECTION, POWDER, FOR SOLUTION INTRAMUSCULAR; INTRAVENOUS at 22:43

## 2023-06-07 RX ADMIN — ATORVASTATIN CALCIUM 40 MG: 40 TABLET, FILM COATED ORAL at 17:19

## 2023-06-07 RX ADMIN — DOXYCYCLINE 100 MG: 100 CAPSULE ORAL at 09:24

## 2023-06-07 RX ADMIN — LEVALBUTEROL HYDROCHLORIDE 1.25 MG: 1.25 SOLUTION RESPIRATORY (INHALATION) at 13:27

## 2023-06-07 RX ADMIN — DOCUSATE SODIUM 100 MG: 100 CAPSULE, LIQUID FILLED ORAL at 17:19

## 2023-06-07 RX ADMIN — LEVALBUTEROL HYDROCHLORIDE 1.25 MG: 1.25 SOLUTION RESPIRATORY (INHALATION) at 07:40

## 2023-06-07 RX ADMIN — LEVALBUTEROL HYDROCHLORIDE 1.25 MG: 1.25 SOLUTION RESPIRATORY (INHALATION) at 19:56

## 2023-06-07 RX ADMIN — MELATONIN 3 MG: at 22:43

## 2023-06-07 RX ADMIN — GUAIFENESIN 1200 MG: 600 TABLET, EXTENDED RELEASE ORAL at 22:43

## 2023-06-07 RX ADMIN — IPRATROPIUM BROMIDE 0.5 MG: 0.5 SOLUTION RESPIRATORY (INHALATION) at 07:40

## 2023-06-07 RX ADMIN — Medication 7.5 MG: at 13:51

## 2023-06-07 RX ADMIN — DOCUSATE SODIUM 100 MG: 100 CAPSULE, LIQUID FILLED ORAL at 09:24

## 2023-06-07 RX ADMIN — CHLORHEXIDINE GLUCONATE 15 ML: 1.2 SOLUTION ORAL at 09:22

## 2023-06-07 RX ADMIN — DOXYCYCLINE 100 MG: 100 CAPSULE ORAL at 22:43

## 2023-06-07 RX ADMIN — CHLORHEXIDINE GLUCONATE 15 ML: 1.2 SOLUTION ORAL at 22:43

## 2023-06-07 RX ADMIN — ENOXAPARIN SODIUM 40 MG: 40 INJECTION SUBCUTANEOUS at 09:21

## 2023-06-07 RX ADMIN — BUDESONIDE 0.5 MG: 0.5 INHALANT ORAL at 07:40

## 2023-06-07 RX ADMIN — FORMOTEROL FUMARATE DIHYDRATE 20 MCG: 20 SOLUTION RESPIRATORY (INHALATION) at 07:40

## 2023-06-07 RX ADMIN — TAMSULOSIN HYDROCHLORIDE 0.4 MG: 0.4 CAPSULE ORAL at 17:19

## 2023-06-07 RX ADMIN — CYANOCOBALAMIN TAB 500 MCG 1000 MCG: 500 TAB at 09:24

## 2023-06-07 RX ADMIN — FORMOTEROL FUMARATE DIHYDRATE 20 MCG: 20 SOLUTION RESPIRATORY (INHALATION) at 19:56

## 2023-06-07 RX ADMIN — IPRATROPIUM BROMIDE 0.5 MG: 0.5 SOLUTION RESPIRATORY (INHALATION) at 13:27

## 2023-06-07 RX ADMIN — TORSEMIDE 20 MG: 20 TABLET ORAL at 09:20

## 2023-06-07 RX ADMIN — POTASSIUM CHLORIDE 40 MEQ: 1500 TABLET, EXTENDED RELEASE ORAL at 09:20

## 2023-06-07 RX ADMIN — METHYLPREDNISOLONE SODIUM SUCCINATE 40 MG: 40 INJECTION, POWDER, FOR SOLUTION INTRAMUSCULAR; INTRAVENOUS at 09:21

## 2023-06-07 RX ADMIN — IPRATROPIUM BROMIDE 0.5 MG: 0.5 SOLUTION RESPIRATORY (INHALATION) at 19:56

## 2023-06-07 NOTE — ASSESSMENT & PLAN NOTE
-chronic hyponatremia due to chronic HFrEF and 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 CA  -Currently Na 129  -Appreciate nephrology  -Continue increased solute intake, fluid restriction, diuretics     -s/p 7 5mg Samsca on 6/6/23

## 2023-06-07 NOTE — PROGRESS NOTES
[facility]  Progress Note  Name: Patience Batres  MRN: [id number]  Unit/Bed#: [facility] 205-01 I Date of Admission: 6/1/2023   Date of Service: 6/7/2023 I Hospital Day: 6    Assessment/Plan   Pneumonia  Assessment & Plan  -CXR (reviewed by me) 6/1/23: Mild right basilar airspace opacity concerning for pneumonia  Background emphysematous changes  -pt was on Vancomycin/cefepime, now on Doxy (MRSA POS nares)  -BCxs NGTD  -pulm following  -Speech therapy consulted, no change in diet recommended    Acute exacerbation of congestive heart failure (HCC)  Assessment & Plan  Wt Readings from Last 3 Encounters:   06/06/23 47 2 kg (104 lb)   06/01/23 46 7 kg (103 lb)   05/19/23 46 8 kg (103 lb 2 8 oz)     · Echo on 5/1/2023 - LVEF 20%, severe global hypokinesis, abnormal mildly septal motion consistent with left bundle branch block and diastolic flattening of interventricular septum consistent with right ventricule volume overload  ·   · S/p 2 doses of IV Lasix 40 mg  · Continue home torsemide 20 mg daily  · Daily weights and strict ins and outs  * Chronic obstructive pulmonary disease (HCC)  Assessment & Plan  · Currently with acute exacerbation  · Pt reports he smoked 2-3ppd for approx 45 years  · History of severe COPD with multiple admissions for COPD exacerbation  · PFT on 9/16/2020 showed FEV1/FVC 30%, FVC 59% predicted and FEV1 22% predicted  · Presented with respiratory distress requiring BiPAP  · Currently on high flow (50L/57%) at daytime and BiPAP at night  · Continue Pulmicort, formoterol, Atrovent, Xopenex  · Continue Solu-Medrol 40 mg every 12 hours  Adjust as appropriate  · Pulmonology following  · Patient has end-stage COPD and has been seen by palliative care  He continues to want aggressive measures and to remain full code      Hyponatremia  Assessment & Plan  -chronic hyponatremia due to chronic HFrEF and 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 CA  -Currently Na [facility] nephrology  -Continue increased solute intake, fluid restriction, diuretics  -s/p 7 5mg Samsca on 23      Osteoporosis  Assessment & Plan  · DEXA scan on 2019 showed osteoporosis  · Not on any medications  Previously on bisphosphonate  · Follow-up outpatient with PCP  KEVIN and COPD overlap syndrome (HCC)  Assessment & Plan  · Continue BiPAP at night    Anemia  Assessment & Plan  · History of chronic anemia  · No active sign of bleeding  · Trend CBC  · Restart home B12   · Transfuse if hemoglobin less than 7  VTE Pharmacologic Prophylaxis:   Lovenox    Patient Centered Rounds: I performed bedside rounds with nursing staff today  Total Time Spent on Date of Encounter in care of patient: 45 minutes This time was spent on one or more of the following: performing physical exam; counseling and coordination of care; obtaining or reviewing history; documenting in the medical record; reviewing/ordering tests, medications or procedures; communicating with other healthcare professionals and discussing with patient's family/caregivers  Current Length of Stay: 6 day(s)  Current Patient Status: Inpatient   Certification Statement: The patient will continue to require additional inpatient hospital stay due to respiratory failure  Discharge Plan: Anticipate discharge in >72 hrs to rehab facility  Code Status: Level 1 - Full Code    Subjective:   Patient reports shortness of breath  Objective:     Vitals:   Temp (24hrs), Av 9 °F (36 6 °C), Min:97 3 °F (36 3 °C), Max:98 4 °F (36 9 °C)    Temp:  [97 3 °F (36 3 °C)-98 4 °F (36 9 °C)] 97 3 °F (36 3 °C)  HR:  [] 74  Resp:  [24-40] 24  BP: ()/(58-69) 91/64  SpO2:  [89 %-100 %] 100 %  Body mass index is 20 31 kg/m²  Input and Output Summary (last 24 hours):      Intake/Output Summary (Last 24 hours) at 2023 0936  Last data filed at 2023 0831  Gross per 24 hour   Intake 480 ml   Output 2768 ml   Net -2288 ml Physical Exam:   Gen: appears in mild-moderate respiratory distress, using accessory muscles to breathe, AAOx3, appears chronically ill, cachectic, malnourished  Eyes: EOMI, PERRLA, no scleral icterus  ENMT:  no nasal discharge, no otic discharge, moist mucous membranes  Neck:  Supple  Cardiovascular:  Regular rate and rhythm, normal S1-S2, no murmurs, rubs, or gallops  Lungs:  Dec AE and mod wheezes B/L  Abdomen: remains Positive bowel sounds, soft, nontender, nondistended   Skin:  Intact, no obvious lesions or rashes  Neuro: Cranial nerves 2-12 are intact, non-focal    Additional Data:     Labs:  Results from last 7 days   Lab Units 06/05/23  0539   EOS PCT % 0   HEMATOCRIT % 32 5*   HEMOGLOBIN g/dL 10 8*   LYMPHS PCT % 4*   MONOS PCT % 7   NEUTROS PCT % 89*   PLATELETS Thousands/uL 175   WBC Thousand/uL 8 15     Results from last 7 days   Lab Units 06/06/23  2100   ANION GAP mmol/L 1*   BUN mg/dL 27*   CALCIUM mg/dL 8 7   CHLORIDE mmol/L 85*   CO2 mmol/L 43*   CREATININE mg/dL 0 85   GLUCOSE RANDOM mg/dL 116   POTASSIUM mmol/L 3 4*   SODIUM mmol/L 129*     Results from last 7 days   Lab Units 06/01/23  1245   INR  0 84             Results from last 7 days   Lab Units 06/03/23  0534 06/02/23  0606 06/01/23  1245 06/01/23  1220   LACTIC ACID mmol/L  --   --  1 7  --    PROCALCITONIN ng/ml 0 22 0 27*  --  0 07       Lines/Drains:  Invasive Devices     Peripheral Intravenous Line  Duration           Peripheral IV 06/05/23 Left;Proximal;Ventral (anterior) Forearm 2 days          Drain  Duration           External Urinary Catheter Small 5 days                Recent Cultures (last 7 days):   Results from last 7 days   Lab Units 06/01/23  1245   BLOOD CULTURE  No Growth After 5 Days  No Growth After 5 Days         Last 24 Hours Medication List:   Current Facility-Administered Medications   Medication Dose Route Frequency Provider Last Rate   • acetaminophen  975 mg Oral Q8H PRN Ang Sofía Jobs, DO     • aspirin 81 mg Oral Daily Ang Remedios Kathya, DO     • atorvastatin  40 mg Oral Daily With Dinner Ang Remedios Kathya, DO     • budesonide  0 5 mg Nebulization Daily [facility]     • chlorhexidine  15 mL Mouth/Throat Q12H [address] [facility] Kathya, DO     • cyanocobalamin  1,000 mcg Oral Daily Sean Cox MD     • docusate sodium  100 mg Oral BID Ang Remedios Kathya, DO     • doxycycline hyclate  100 mg Oral Q12H [address] [facility]     • enoxaparin  40 mg Subcutaneous Daily Ang Remedios Kathya, DO     • formoterol  20 mcg Nebulization Q12H [facility]     • guaiFENesin  1,200 mg Oral Q12H [address] [facility] Kathya, DO     • ipratropium  0 5 mg Nebulization TID [facility], DO     • levalbuterol  1 25 mg Nebulization TID [facility], DO     • melatonin  3 mg Oral HS Ang Remedios Kathya, DO     • methylPREDNISolone sodium succinate  40 mg Intravenous Q12H [address] [facility]     • sodium chloride  1 spray Each Nare Q1H PRN [facility], DO     • tamsulosin  0 4 mg Oral Daily With Dinner Ang Remedios Kathya, DO     • torsemide  20 mg Oral Daily [facility], [facility]          Today, Patient Was Seen By: Sean Cox MD    **Please Note: This note may have been constructed using a voice recognition system  **

## 2023-06-07 NOTE — PROGRESS NOTES
NEPHROLOGY PROGRESS NOTE   Tracy Seth  79 y o  male MRN: [id number]  Unit/Bed#: [facility] 205-01 Encounter: [id number]        ASSESSMENT & PLAN     1   Acute on chronic hyponatremia  • This is in the setting of low output congestive heart failure and prostate cancer which likely has high ADH release his sodium level is slowly rising maintained on oral torsemide and fluid restriction  • His sodium level is improving up to 129 today, we will give him another dose of tolvaptan today and maintain a 1 8 L fluid restriction with a net negative fluid balance if there is a volume component to his shortness of breath will maintain a negative fluid balance given his poor pulmonary reserve in the setting of an advanced COPD    2  Acute on chronic hypoxic respiratory failure  • He is currently being treated for COPD exacerbation, he is currently also being diuresed  • Unfortunately his breathing remains poor    3  Congestive heart failure with a reduced ejection fraction  • Continue diuresis, adding tolvaptan, monitor ins and outs and daily weights  • Making more urine, but bed scale weight did increase  • Chest x-ray shows mild right basilar airspace opacity and background emphysematous changes  • Continue negative fluid balance    4  Frailty and cachexia  • Continue nutritional optimization  • Okay to take Ensure 3 times daily     DISCUSSION:    Ongoing evaluation by palliative care given advanced COPD and low output congestive heart failure    Sodium is improving we will give another dose of tolvaptan  And repeat a BMP tomorrow    Discussed plan of care with Dr Christal Kirk, palliative care, and nurse and we were in agreement with plan above regarding hyponatremia    SUBJECTIVE:    The patient was seen today  His shortness of breath continues to remain poor  Not improved remains on high flow nasal cannula    12 point review of systems was otherwise negative besides what is mentioned above      Medications:    Current Facility-Administered Medications:   •  acetaminophen (TYLENOL) tablet 975 mg, 975 mg, Oral, Q8H PRN, Ang Kassy Cera, DO, 975 mg at 06/05/23 8230  •  aspirin (ECOTRIN LOW STRENGTH) EC tablet 81 mg, 81 mg, Oral, Daily, Ang Kassy Cera, DO, 81 mg at 06/07/23 9123  •  atorvastatin (LIPITOR) tablet 40 mg, 40 mg, Oral, Daily With Dinner, Ang Kassy Cera, DO, 40 mg at 06/06/23 1734  •  budesonide (PULMICORT) inhalation solution 0 5 mg, 0 5 mg, Nebulization, Daily, Ang Kassy Cera, DO, 0 5 mg at 06/07/23 0740  •  chlorhexidine (PERIDEX) 0 12 % oral rinse 15 mL, 15 mL, Mouth/Throat, Q12H [facility], Ang Kassy Cera, DO, 15 mL at 06/07/23 6482  •  cyanocobalamin (VITAMIN B-12) tablet 1,000 mcg, 1,000 mcg, Oral, Daily, Roopa Vincent MD, 1,000 mcg at 06/07/23 1363  •  docusate sodium (COLACE) capsule 100 mg, 100 mg, Oral, BID, Ang Kassy Cera, DO, 100 mg at 06/07/23 4326  •  doxycycline hyclate (VIBRAMYCIN) capsule 100 mg, 100 mg, Oral, Q12H [facility], Ang Kassy Cera, DO, 100 mg at 06/07/23 4445  •  enoxaparin (LOVENOX) subcutaneous injection 40 mg, 40 mg, Subcutaneous, Daily, Ang Kassy Cera, DO, 40 mg at 06/07/23 9911  •  formoterol (PERFOROMIST) nebulizer solution 20 mcg, 20 mcg, Nebulization, Q12H, Ang Kassy Cera, DO, 20 mcg at 06/07/23 0740  •  guaiFENesin (MUCINEX) 12 hr tablet 1,200 mg, 1,200 mg, Oral, Q12H [facility], Ang Kassy Cera, DO, 1,200 mg at 06/07/23 4612  •  ipratropium (ATROVENT) 0 02 % inhalation solution 0 5 mg, 0 5 mg, Nebulization, TID, Ang Kassy Cera, DO, 0 5 mg at 06/07/23 0740  •  levalbuterol (Arva Tad) inhalation solution 1 25 mg, 1 25 mg, Nebulization, TID, Ang Kassy Cera, DO, 1 25 mg at 06/07/23 0740  •  melatonin tablet 3 mg, 3 mg, Oral, HS, Ang Kassy Cera, DO, 3 mg at 06/06/23 2023  •  methylPREDNISolone sodium succinate (Solu-MEDROL) injection 40 mg, 40 mg, Intravenous, Q12H [facility], Ang Kassy Cera, DO, 40 mg at 06/07/23 2138  •  sodium chloride (OCEAN) 0 65 % nasal spray 1 spray, 1 spray, Each Nare, Q1H PRN, Ang Didi Gordon, DO, 1 spray at 06/04/23 3436  •  tamsulosin (FLOMAX) capsule 0 4 mg, 0 4 mg, Oral, Daily With Dinner, Ang Didi Gordon, DO, 0 4 mg at 06/06/23 1734  •  tolvaptan (Samsca) split tablet 7 5 mg, 7 5 mg, Oral, Once, Starling Shangel, DO  •  torsemide [facility]) tablet 20 mg, 20 mg, Oral, Daily, Ang Didi Gordon, DO, 20 mg at 06/07/23 0920    OBJECTIVE:    Vitals:    06/07/23 0312 06/07/23 0646 06/07/23 0743 06/07/23 1000   BP: (!) 89/67 91/64     BP Location: Right arm      Pulse: 80 74     Resp: (!) 24      Temp: (!) 97 3 °F (36 3 °C)   98 1 °F (36 7 °C)   TempSrc: Axillary   Oral   SpO2: 100% 99% 100%    Weight:       Height:            Temp:  [97 3 °F (36 3 °C)-98 4 °F (36 9 °C)] 98 1 °F (36 7 °C)  HR:  [] 74  Resp:  [24-40] 24  BP: ()/(58-69) 91/64  SpO2:  [89 %-100 %] 100 %     Body mass index is 20 31 kg/m²  Weight (last 2 days)     Date/Time Weight    06/06/23 0600 47 2 (104)    06/05/23 0553 46 (101 4)          I/O last 3 completed shifts:   In: 900 [P O :900]  Out: 4100 [Urine:4100]    I/O this shift:  In: -   Out: 370 [Urine:370]      Physical exam:    General: no acute distress, cooperative  Eyes: conjunctivae pink, anicteric sclerae  ENT: lips and mucous membranes moist, no exudates, normal external ears  Neck: ROM intact, no JVD  Chest: Positive wheezing  CVS: normal rate, non pericardial friction rub  Abdomen: soft, non-tender, non-distended, normoactive bowel sounds  Extremities: no edema of both legs  Skin: no rash  Neuro: awake, alert, oriented, grossly intact  Psych:  Pleasant affect    Invasive Devices:      Lab Results:   Results from last 7 days   Lab Units 06/07/23  1018 06/06/23  2100 06/06/23  1545 06/06/23  0548 06/05/23  1602 06/05/23  0820 06/05/23  0539 06/04/23  0929 06/04/23  0454 06/03/23  1111 06/03/23  0534 06/03/23  0057 06/02/23  1340 06/02/23  0853 06/02/23  0606 06/01/23  1827 06/01/23  1704 06/01/23  1245 06/01/23  1220   BLOOD CULTURE   -- " --   --   --   --   --   --   --   --   --   --   --   --   --   --   --   --  No Growth After 5 Days  No Growth After 5 Days  --    BUN mg/dL 29* 27* 25 24 25   < >  --    < >  --    < > 25   < > 23   < > 23   < >  --   --  19   CALCIUM mg/dL 9 1 8 7 8 5 8 9 8 1*   < >  --    < >  --    < > 9 3   < > 9 1   < > 8 5   < >  --   --  8 9   CHLORIDE mmol/L 87* 85* 84* 81* 80*   < >  --    < >  --    < > 83*   < > 85*   < > 84*   < >  --   --  84*   CO2 mmol/L >45* 43* 44* >45* 43*   < >  --    < >  --    < > 43*   < > 41*   < > 42*   < >  --   --  40*   CREATININE mg/dL 0 95 0 85 1 05 0 79 0 77   < >  --    < >  --    < > 0 61   < > 0 65   < > 0 66   < >  --   --  0 67   HEMATOCRIT %  --   --   --   --   --   --  32 5*  --  32 8*  --  33 9*  --   --   --  33 5*  --   --   --  37 7   HEMOGLOBIN g/dL  --   --   --   --   --   --  10 8*  --  11 0*  --  11 3*  --   --   --  10 6*  --   --   --  11 7*   POTASSIUM mmol/L 3 7 3 4* 4 2 4 0 3 8   < >  --    < >  --    < > 4 0   < > 4 4   < > 4 3   < >  --   --  4 5   MAGNESIUM mg/dL  --   --   --   --   --   --  1 6  --  1 8  --   --   --  2 0  --  2 0  --   --   --   --    PHOSPHORUS mg/dL  --   --   --   --   --   --  2 9  --  3 0  --   --   --  3 6  --  3 5  --   --   --   --    PLATELETS Thousands/uL  --   --   --   --   --   --  175  --  186  --  178  --   --   --  155  --  173  --  178   WBC Thousand/uL  --   --   --   --   --   --  8 15  --  5 19  --  3 07*  --   --   --  3 96*  --   --   --  8 08    < > = values in this interval not displayed  Portions of the record may have been created with voice recognition software  Occasional wrong word or \"sound a like\" substitutions may have occurred due to the inherent limitations of voice recognition software  Read the chart carefully and recognize, using context, where substitutions have occurred  If you have any questions, please contact the dictating provider      "

## 2023-06-07 NOTE — ASSESSMENT & PLAN NOTE
· Currently with acute exacerbation  · Pt reports he smoked 2-3ppd for approx 45 years  · History of severe COPD with multiple admissions for COPD exacerbation  · PFT on 9/16/2020 showed FEV1/FVC 30%, FVC 59% predicted and FEV1 22% predicted  · Presented with respiratory distress requiring BiPAP  · Currently on high flow (50L/57%) at daytime and BiPAP at night  · Continue Pulmicort, formoterol, Atrovent, Xopenex  · Continue Solu-Medrol 40 mg every 12 hours  Adjust as appropriate  · Pulmonology following  · Patient has end-stage COPD and has been seen by palliative care  He continues to want aggressive measures and to remain full code

## 2023-06-07 NOTE — UTILIZATION REVIEW
Continued Stay Review    Date: 6-6-23                         Current Patient Class: inpatient  Current Level of Care: med surg    HPI:68 y o  male initially admitted on 6-1-23     Assessment/Plan:   Patient with acute on chronic CHF with reduced EF  Chest x ray shows right basilar airspace opacity and emphysematous  Patient  is making urine but bed scale weight has increased despite diuresis  Patient requires 50 L O2 Bipap and high flow nasal cannula  Though patient endorses difficulty breathing but wishes  to continue treatment and is full code   Consider Echo  Continue to wean oxygen  Continue  Iv solumedrol, po demadex, xopenex tid, atrovent tid        Vital Signs:     Date/  Time Temp Pulse Resp BP MAP  SpO2 O2 Flow Rate (L/min) Nasal Cannula O2 Flow Rate (L/min) O2 Device   06/06/23 2316 97 8 °F   (36 6 °C) 96 24   Abnormal  91/63 70 99 % -- -- BiPAP   06/06/23 2053 -- -- -- -- -- -- -- -- --   06/06/23 1948 -- -- -- -- -- -- -- -- --   06/06/23 1900 98 1 °F   (36 7 °C) 106   Abnormal  28   Abnormal  110/69 81 99 % -- -- BiPAP   06/06/23 1849 -- -- -- -- -- 91 % -- -- --   06/06/23 1830 -- -- -- -- -- 91 % 50 L/min 50 L/min --   06/06/23 1510 98 4 °F   (36 9 °C) 88 -- 112/61 76 94 % -- -- --   06/06/23 1210 -- 80 40   Abnormal  102/58 70 89 % Abnormal  -- -- --   06/06/23 0900 -- -- -- -- -- -- 50 L/min -- --   06/06/23 0829 98 1 °F   (36 7 °C) -- -- -- -- -- -- -- --   06/06/23 0730 -- 86 -- 98/60 72 93 % -- -- --   06/06/23 0726 -- -- -- -- -- 93 % 50 L/min -- High flow nasal cannula   06/06/23 0450 -- -- -- -- -- 97 % -- -- --   06/06/23 0300 97 3 °F   (36 3 °C)   Abnormal  90 23   Abnormal  95/65 73 97 % -- -- --           Pertinent Labs/Diagnostic Results:       Results from last 7 days   Lab Units 06/05/23  0539 06/04/23  0454 06/03/23  0534 06/02/23  0606 06/01/23  1704 06/01/23  1220   HEMATOCRIT % 32 5* 32 8* 33 9* 33 5*  --  37 7   HEMOGLOBIN g/dL 10 8* 11 0* 11 3* 10 6*  --  11 7*   NEUTROS ABS Thousands/µL 7 20 4 42  --  2 67  --  7 01   PLATELETS Thousands/uL 175 186 178 155   < > 178   WBC Thousand/uL 8 15 5 19 3 07* 3 96*  --  8 08    < > = values in this interval not displayed  Results from last 7 days   Lab Units 06/06/23  2100 06/06/23  1545 06/06/23  0548 06/05/23  1602 06/05/23  0820 06/05/23  0539 06/05/23  0032 06/04/23  7413 06/04/23  0454 06/03/23  0057 06/02/23  1409 06/02/23  1340 06/02/23  0853 06/02/23  0606   ANION GAP mmol/L 1* 0*  --  2* 1*  --  1*   < >  --    < >  --  -1*   < > 0*   BUN mg/dL 27* 25 24 25 23  --  24   < >  --    < >  --  23   < > 23   CALCIUM, IONIZED mmol/L  --   --   --   --   --   --   --   --   --   --  0 99*  --   --  1 06*   CALCIUM mg/dL 8 7 8 5 8 9 8 1* 8 6  --  8 4   < >  --    < >  --  9 1   < > 8 5   CHLORIDE mmol/L 85* 84* 81* 80* 83*  --  82*   < >  --    < >  --  85*   < > 84*   CO2 mmol/L 43* 44* >45* 43* 42*  --  42*   < >  --    < >  --  41*   < > 42*   CREATININE mg/dL 0 85 1 05 0 79 0 77 0 86  --  0 74   < >  --    < >  --  0 65   < > 0 66   EGFR ml/min/1 73sq m 90 73 93 94 90  --  96   < >  --    < >  --  101   < > 100   POTASSIUM mmol/L 3 4* 4 2 4 0 3 8 3 8  --  3 9   < >  --    < >  --  4 4   < > 4 3   MAGNESIUM mg/dL  --   --   --   --   --  1 6  --   --  1 8  --   --  2 0  --  2 0   PHOSPHORUS mg/dL  --   --   --   --   --  2 9  --   --  3 0  --   --  3 6  --  3 5   SODIUM mmol/L 129* 128* 127* 125* 126*  --  125*   < >  --    < >  --  125*   < > 126*    < > = values in this interval not displayed               Results from last 7 days   Lab Units 06/06/23  2100 06/06/23  1545 06/06/23  0548 06/05/23  1602 06/05/23  0820 06/05/23  0032 06/04/23  1619 06/04/23  9189 06/04/23  0006 06/03/23  1703 06/03/23  1111   GLUCOSE RANDOM mg/dL 116 293* 159* 180* 160* 148* 161* 241* 179* 203* 222*     Results from last 7 days   Lab Units 06/01/23  1704   OSMOLALITY, SERUM mmol/*       Results from last 7 days   Lab Units 06/02/23  0606 06/02/23  0001 06/01/23  1704   BASE EXC JHONATHAN mmol/L 14 5 12 8 8 9   HCO3 JHONATHAN mmol/L 43 3* 41 5* 39 0*   O2 CONTENT JHONATHAN ml/dL 14 3 12 4 14 8   O2 HGB, VENOUS % 85 9* 73 5 83 4*   PCO2 JHONATHAN mm Hg 79 1* 77 5* 87 0*   PH JHONATHAN  7 356 7 347 7 269*   PO2 JHONATHAN mm Hg 56 7* 41 9 55 0*             Results from last 7 days   Lab Units 06/01/23  1442 06/01/23  1220   HS TNI 0HR ng/L  --  28   HS TNI 2HR ng/L 27  --    HSTNI D2 ng/L -1  --          Results from last 7 days   Lab Units 06/01/23  1245   INR  0 84   PROTIME seconds 11 7   PTT seconds 26     Results from last 7 days   Lab Units 06/06/23  0548   TSH 3RD GENERATON uIU/mL 1 301     Results from last 7 days   Lab Units 06/03/23  0534 06/02/23  0606 06/01/23  1220   PROCALCITONIN ng/ml 0 22 0 27* 0 07     Results from last 7 days   Lab Units 06/01/23  1245   LACTIC ACID mmol/L 1 7             Results from last 7 days   Lab Units 06/01/23  1220   BNP pg/mL 814*       Results from last 7 days   Lab Units 06/01/23  2259 06/01/23  1704   OSMOLALITY, SERUM mmol/KG  --  272*   OSMO UR mmol/  --      Results from last 7 days   Lab Units 06/01/23  2259   SODIUM UR  33       Results from last 7 days   Lab Units 06/01/23  1245   BLOOD CULTURE  No Growth After 5 Days  No Growth After 5 Days               Results from last 7 days   Lab Units 06/03/23  0534   VANCOMYCIN RM ug/mL 18 7       Medications:   Scheduled Medications:  aspirin, 81 mg, Oral, Daily  atorvastatin, 40 mg, Oral, Daily With Dinner  budesonide, 0 5 mg, Nebulization, Daily  chlorhexidine, 15 mL, Mouth/Throat, Q12H GABE  cyanocobalamin, 1,000 mcg, Oral, Daily  docusate sodium, 100 mg, Oral, BID  doxycycline hyclate, 100 mg, Oral, Q12H GABE  enoxaparin, 40 mg, Subcutaneous, Daily  formoterol, 20 mcg, Nebulization, Q12H  guaiFENesin, 1,200 mg, Oral, Q12H GABE  ipratropium, 0 5 mg, Nebulization, TID  levalbuterol, 1 25 mg, Nebulization, TID  melatonin, 3 mg, Oral, HS  methylPREDNISolone sodium succinate, 40 mg, Intravenous, Q12H [facility]  tamsulosin, 0 4 mg, Oral, Daily With Dinner  torsemide, 20 mg, Oral, Daily      Continuous IV Infusions:     PRN Meds:  acetaminophen, 975 mg, Oral, Q8H PRN  sodium chloride, 1 spray, Each Nare, Q1H PRN        Discharge Plan: inpatient med surg    Network Utilization Review Department  ATTENTION: Please call with any questions or concerns to [phone] and carefully listen to the prompts so that you are directed to the right person  All voicemails are confidential   Gayathri Shaw all requests for admission clinical reviews, approved or denied determinations and any other requests to dedicated fax number below belonging to the campus where the patient is receiving treatment   List of dedicated fax numbers for the Facilities:  [address] DENIALS (Administrative/Medical Necessity) [phone]   [address] (Maternity/NICU/Pediatrics) [phone]   4 Nicole Hudson [phone]   [facility] [phone]   [address] [address] [address] [place] [place] [address] [facility] [phone]   [address] [place] [facility] [address] [phone]

## 2023-06-07 NOTE — PROGRESS NOTES
PULMONOLOGY PROGRESS NOTE     Name: Roxane Hester  Age & Sex: 79 y o  male   MRN: [id number]  Unit/Bed#: [facility] 205-01   Encounter: [id number]    PATIENT INFORMATION     Name: Roxane Hester  Age & Sex: 79 y o  male   MRN: [id number]  Hospital Stay Days: 6    ASSESSMENT/PLAN     Assessment:   1  Acute on chronic respiratory failure with hypoxia and hypercapnia  2  Acute on chronic heart failure with reduced ejection fraction  3  Acute exacerbation of COPD  4  Very severe COPD, end-stage  5  KEVIN/COPD overlap  6  Concern for pneumonia    Plan:  • Continue supplemental oxygen to maintain SPO2 greater than 88%  Patient continues to require high flow NC  Continue to monitor and see if patient can tolerate midflow  • Continue BiPAP at night due to likely KEVIN/COPD with severe risk for hypercapnia and respiratory failure  • Continue bronchodilators with Xopenex/Atrovent and Perforomist/Pulmicort  • On Solu-Medrol 40 mg twice daily as he was not actively wheezing on examination today  • Continue diuresis as likely his heart failure in combination with end stage COPD is likely causing his acute presentation  • He was started on broad-spectrum antibiotics on 6/3 due to elevated drip score  Procalcitonin has been grossly unremarkable  MRSA nares culture positive  Currently is on doxycycline  Blood cultures are negative for 5 days  Recommend discontinuation of vancomycin and cefepime  Can continue doxycycline for 5-day course for tracheobronchitis, today is day#6  • Multiple discussions previously with hospice/palliative care  Patient seems to be uncertain with his degree of acceptance of his current conditions and likelihood of meaningful recovery  Palliative care input is greatly appreciated  Palliative team has been consulted and currently ongoing goals of care discussion taking place  Plan is for wife Larry Lee to come to the hospital today afternoon to further discuss goals of care         SUBJECTIVE Patient seen and examined, laying in bed  Appears cachectic and deconditioned  No acute events overnight  Patient denies any fevers, chills, CP  Does have some shortness of breath and is on high flow 55% FiO2 and 50L on exam, saturating well  He feels that he is still feeling SOB despite the high flow  He feels frustrated about the current situation and being in the hospital      OBJECTIVE     Vitals:    23 0743 23 1000 23 1100 23 1120   BP:   95/62 106/62   BP Location:       Pulse:   86 86   Resp:       Temp:  98 1 °F (36 7 °C)  98 3 °F (36 8 °C)   TempSrc:  Oral  Oral   SpO2: 100%  99% 98%   Weight:       Height:          Temperature:   Temp (24hrs), Av °F (36 7 °C), Min:97 3 °F (36 3 °C), Max:98 4 °F (36 9 °C)    Temperature: 98 3 °F (36 8 °C)  Intake & Output:  I/O       / 0701   0700  0701  / 0700 / 0701  / 0700    P  O  560 600     Total Intake(mL/kg) 560 (11 9) 600 (12 7)     Urine (mL/kg/hr) 1600 (1 4) 2500 (2 2) 370 (1 3)    Stool  0     Total Output 1600 2500 370    Net -1040 -1900 -370           Unmeasured Urine Occurrence  1 x     Unmeasured Stool Occurrence  0 x         Weights:   IBW (Ideal Body Weight): 50 kg    Body mass index is 20 31 kg/m²  Weight (last 2 days)     Date/Time Weight    23 0600 47 2 (104)    23 0553 46 (101 4)        Physical Exam  Vitals reviewed  Constitutional:       Appearance: He is cachectic  He is ill-appearing  Eyes:      General:         Right eye: No discharge  Left eye: No discharge  Conjunctiva/sclera: Conjunctivae normal    Cardiovascular:      Rate and Rhythm: Normal rate and regular rhythm  Pulses: Normal pulses  Heart sounds: Normal heart sounds  No murmur heard  No gallop  Pulmonary:      Effort: Tachypnea and accessory muscle usage present  No respiratory distress  Breath sounds: Decreased air movement present        Comments: Decreased breath sounds bilaterally and use of accessory muscles noted  On high flow NC saturating well on exam  Abdominal:      General: Bowel sounds are normal  There is no distension  Palpations: Abdomen is soft  Musculoskeletal:      Right lower leg: No edema  Left lower leg: No edema  Skin:     General: Skin is warm and dry  Neurological:      Mental Status: He is oriented to person, place, and time  Psychiatric:         Attention and Perception: Attention normal       Comments: Upset, frustrated            LABORATORY DATA     Labs: I have personally reviewed pertinent reports  Results from last 7 days   Lab Units 06/05/23  0539 06/04/23  0454 06/03/23  0534 06/02/23  0606   EOS PCT % 0 0  --  0   HEMATOCRIT % 32 5* 32 8* 33 9* 33 5*   HEMOGLOBIN g/dL 10 8* 11 0* 11 3* 10 6*   MONOS PCT % 7 9  --  22*   NEUTROS PCT % 89* 85*  --  68   PLATELETS Thousands/uL 175 186 178 155   WBC Thousand/uL 8 15 5 19 3 07* 3 96*      Results from last 7 days   Lab Units 06/07/23  1018 06/06/23  2100 06/06/23  1545   BUN mg/dL 29* 27* 25   CALCIUM mg/dL 9 1 8 7 8 5   CHLORIDE mmol/L 87* 85* 84*   CO2 mmol/L >45* 43* 44*   CREATININE mg/dL 0 95 0 85 1 05   POTASSIUM mmol/L 3 7 3 4* 4 2     Results from last 7 days   Lab Units 06/05/23  0539 06/04/23  0454 06/02/23  1340   MAGNESIUM mg/dL 1 6 1 8 2 0     Results from last 7 days   Lab Units 06/05/23  0539 06/04/23  0454 06/02/23  1340   PHOSPHORUS mg/dL 2 9 3 0 3 6      Results from last 7 days   Lab Units 06/01/23  1245   INR  0 84   PTT seconds 26     Results from last 7 days   Lab Units 06/01/23  1245   LACTIC ACID mmol/L 1 7             ABG:       Micro:   Results from last 7 days   Lab Units 06/02/23  1120 06/01/23  1245   BLOOD CULTURE   --  No Growth After 5 Days  No Growth After 5 Days  MRSA CULTURE ONLY  Methicillin Resistant Staphylococcus aureus isolated*  --          IMAGING & DIAGNOSTIC TESTING     Radiology Results: I have personally reviewed pertinent reports    XR foot 3+ vw right    Result Date: 6/6/2023  Impression: No acute osseous abnormality  Workstation performed: [id number]     XR chest 1 view portable    Result Date: 6/2/2023  Impression: 1  Mild right basilar airspace opacity concerning for pneumonia  2   Background emphysematous changes  Interpretation concordant with preliminary findings from the emergency department  Workstation performed: [id number]     Other Diagnostic Testing: I have personally reviewed pertinent reports      ACTIVE MEDICATIONS     Current Facility-Administered Medications   Medication Dose Route Frequency   • acetaminophen (TYLENOL) tablet 975 mg  975 mg Oral Q8H PRN   • aspirin (ECOTRIN LOW STRENGTH) EC tablet 81 mg  81 mg Oral Daily   • atorvastatin (LIPITOR) tablet 40 mg  40 mg Oral Daily With Dinner   • budesonide (PULMICORT) inhalation solution 0 5 mg  0 5 mg Nebulization Daily   • chlorhexidine (PERIDEX) 0 12 % oral rinse 15 mL  15 mL Mouth/Throat Q12H GABE   • cyanocobalamin (VITAMIN B-12) tablet 1,000 mcg  1,000 mcg Oral Daily   • docusate sodium (COLACE) capsule 100 mg  100 mg Oral BID   • doxycycline hyclate (VIBRAMYCIN) capsule 100 mg  100 mg Oral Q12H [facility]   • enoxaparin (LOVENOX) subcutaneous injection 40 mg  40 mg Subcutaneous Daily   • formoterol (PERFOROMIST) nebulizer solution 20 mcg  20 mcg Nebulization Q12H   • guaiFENesin (MUCINEX) 12 hr tablet 1,200 mg  1,200 mg Oral Q12H GABE   • ipratropium (ATROVENT) 0 02 % inhalation solution 0 5 mg  0 5 mg Nebulization TID   • levalbuterol (XOPENEX) inhalation solution 1 25 mg  1 25 mg Nebulization TID   • melatonin tablet 3 mg  3 mg Oral HS   • methylPREDNISolone sodium succinate (Solu-MEDROL) injection 40 mg  40 mg Intravenous Q12H GABE   • sodium chloride (OCEAN) 0 65 % nasal spray 1 spray  1 spray Each Nare Q1H PRN   • tamsulosin (FLOMAX) capsule 0 4 mg  0 4 mg Oral Daily With Dinner   • tolvaptan (Samsca) split tablet 7 5 mg  7 5 mg Oral Once   • torsemide (DEMADEX) tablet 20 mg " 20 mg Oral Daily         Disclaimer: Portions of the record may have been created with voice recognition software  Occasional wrong word or \"sound a like\" substitutions may have occurred due to the inherent limitations of voice recognition software  Careful consideration should be taken to recognize, using context, where substitutions have occurred      Connie Everett MD   PGY-1, [facility]        "

## 2023-06-07 NOTE — PROGRESS NOTES
"Progress note - Palliative and Supportive Care   Ej Galaviz  79 y o  male [id number]    Patient Active Problem List   Diagnosis   • Nonobstructive atherosclerosis of coronary artery   • Nonischemic cardiomyopathy (HCC)   • Chronic obstructive pulmonary disease (HCC)   • Left bundle-branch block   • KEVIN and COPD overlap syndrome ([facility])   • Gastroesophageal reflux disease   • Impaired fasting glucose   • Osteoarthritis   • Osteoporosis   • Vitamin D deficiency   • Mood disorder ([facility])   • Other insomnia   • Macrocytosis without anemia   • Chronic respiratory failure with hypoxia and hypercapnia ([facility])   • Goals of care, counseling/discussion   • BPH with obstruction/lower urinary tract symptoms   • Prostate cancer ([facility] )   • Pulmonary nodules/lesions, multiple   • Protein-calorie malnutrition ([facility])   • Chronic HFrEF (heart failure with reduced ejection fraction) ([facility])   • Anemia   • Hyponatremia   • Physical deconditioning   • Hyperglycemia   • COPD exacerbation ([facility])   • HLD (hyperlipidemia)   • Moderate protein-calorie malnutrition ([facility])   • Acute exacerbation of congestive heart failure (HCC)   • Pneumonia     Active issues specifically addressed today include: pain, shortness of breath, goals of care      Plan:  1  Symptom management -   #Pain    - Acetaminophen 975mg q8hrs prn mild or moderate pain    - has not used since 6/5   - Prevalon boots helping with pain    # Shortness of Breath   - HFNC 50L at 55%   - Budesonide/fomoterol/ipratropium/levalbuterol nebs    # Psychosocial    - Support provided   - Patient has been making comments including that he just wants to go home, he is not sure if he wants to return to the hospital (but later said he would), would not want any \"pounding on his chest\"    - His wife, Karolina Choi, is coming to the hospital this afternoon and we will have a family meeting to further discuss goals of care      2) Goals of Care / Advanced Care Planning     Code Status: Full Code - Level " "1     Decisional apparatus:  Patient is competent on my exam today  If competence is lost, patient's substitute decision maker would default to spouse [place]  Advance Directive / Living Will / POLST:  None on file      Chief Complaint  Chief Complaint   Patient presents with   • Altered Mental Status     With sob as of 0930  Evaluated by vascular and sent to ED  Hx CHF COPD MI       Subjective:  Patient says not much has changed since yesterday  He is frustrated about being in the hospital and unsure if he wants to return or not  He says he wants to go home and spend time with family and be able to move around as he pleases  Review of Systems   Cardiovascular: Negative for chest pain  Respiratory: Positive for cough, shortness of breath and sputum production  Musculoskeletal: Negative for back pain, joint pain and neck pain  Gastrointestinal: Negative for abdominal pain  MEDICATIONS / ALLERGIES:     all current active meds have been reviewed    No Known Allergies    OBJECTIVE:    Physical Exam  Physical Exam  Constitutional:       General: He is not in acute distress  Appearance: He is ill-appearing  Comments: cachectic    HENT:      Mouth/Throat:      Mouth: Mucous membranes are moist    Pulmonary:      Comments: Labored breathing, barrel chested  Musculoskeletal:      Right lower leg: No edema  Left lower leg: No edema  Skin:     General: Skin is warm and dry  Neurological:      Mental Status: He is alert and oriented to person, place, and time  Mental status is at baseline  Psychiatric:         Mood and Affect: Mood normal              Counseling / Coordination of Care  Total floor / unit time spent today 30 minutes  Greater than 50% of total time was spent with the patient and / or family counseling and / or coordination of care       Portions of this document may have been created using dictation software and as such some \"sound alike\" terms may have been " generated by the system  Do not hesitate to contact me with any questions or clarifications

## 2023-06-07 NOTE — PLAN OF CARE
Problem: MOBILITY - ADULT  Goal: Maintain or return to baseline ADL function  Description: INTERVENTIONS:  -  Assess patient's ability to carry out ADLs; assess patient's baseline for ADL function and identify physical deficits which impact ability to perform ADLs (bathing, care of mouth/teeth, toileting, grooming, dressing, etc )  - Assess/evaluate cause of self-care deficits   - Assess range of motion  - Assess patient's mobility; develop plan if impaired  - Assess patient's need for assistive devices and provide as appropriate  - Encourage maximum independence but intervene and supervise when necessary  - Involve family in performance of ADLs  - Assess for home care needs following discharge   - Consider OT consult to assist with ADL evaluation and planning for discharge  - Provide patient education as appropriate  Outcome: Progressing  Goal: Maintains/Returns to pre admission functional level  Description: INTERVENTIONS:  - Perform BMAT or MOVE assessment daily    - Set and communicate daily mobility goal to care team and patient/family/caregiver  - Collaborate with rehabilitation services on mobility goals if consulted  - Perform Range of Motion  times a day  - Reposition patient every  hours    - Dangle patient  times a day  - Stand patient  times a day  - Ambulate patient  times a day  - Out of bed to chair  times a day   - Out of bed for samuel times a day  - Out of bed for toileting  - Record patient progress and toleration of activity level   Outcome: Progressing     Problem: Prexisting or High Potential for Compromised Skin Integrity  Goal: Skin integrity is maintained or improved  Description: INTERVENTIONS:  - Identify patients at risk for skin breakdown  - Assess and monitor skin integrity  - Assess and monitor nutrition and hydration status  - Monitor labs   - Assess for incontinence   - Turn and reposition patient  - Assist with mobility/ambulation  - Relieve pressure over bony prominences  - Avoid friction and shearing  - Provide appropriate hygiene as needed including keeping skin clean and dry  - Evaluate need for skin moisturizer/barrier cream  - Collaborate with interdisciplinary team   - Patient/family teaching  - Consider wound care consult   Outcome: Progressing     Problem: RESPIRATORY - ADULT  Goal: Achieves optimal ventilation and oxygenation  Description: INTERVENTIONS:  - Assess for changes in respiratory status  - Assess for changes in mentation and behavior  - Position to facilitate oxygenation and minimize respiratory effort  - Oxygen administered by appropriate delivery if ordered  - Initiate smoking cessation education as indicated  - Encourage broncho-pulmonary hygiene including cough, deep breathe, Incentive Spirometry  - Assess the need for suctioning and aspirate as needed  - Assess and instruct to report SOB or any respiratory difficulty  - Respiratory Therapy support as indicated  Outcome: Progressing

## 2023-06-07 NOTE — PROGRESS NOTES
Progress note - Palliative and Supportive Care   Kulwinder Fee  79 y o  male [id number]    Patient Active Problem List   Diagnosis   • Nonobstructive atherosclerosis of coronary artery   • Nonischemic cardiomyopathy (HCC)   • Chronic obstructive pulmonary disease (HCC)   • Left bundle-branch block   • KEVIN and COPD overlap syndrome (HCC)   • Gastroesophageal reflux disease   • Impaired fasting glucose   • Osteoarthritis   • Osteoporosis   • Vitamin D deficiency   • Mood disorder (HCC)   • Other insomnia   • Macrocytosis without anemia   • Chronic respiratory failure with hypoxia and hypercapnia ([facility])   • Goals of care, counseling/discussion   • BPH with obstruction/lower urinary tract symptoms   • Prostate cancer ([facility] [address] )   • Pulmonary nodules/lesions, multiple   • Protein-calorie malnutrition (HCC)   • Chronic HFrEF (heart failure with reduced ejection fraction) ([facility])   • Anemia   • Hyponatremia   • Physical deconditioning   • Hyperglycemia   • COPD exacerbation (HCC)   • HLD (hyperlipidemia)   • Moderate protein-calorie malnutrition (HCC)   • Acute exacerbation of congestive heart failure (HCC)   • Pneumonia     Active issues specifically addressed today include:   COPD exacerbation  Chronic respiratory failure with hypoxia and hypercapnia  Chronic HFrEF  PCM  NIC  Goals of care discussion      Plan:  1  Symptom management -   #Pain-per primary    #Psychosocial    -Support provided    2) Goals of Care / [facility] Proxy/Agent/ Power of : None     Code Status: Full code   - Level 1   -Lengthy discussion held today with wife Bere Martinez with patient  Discussed patient's ongoing clinical issues as well as the difficulties with his respiratory status and requiring high flow oxygen  Also discussed his heart failure with the last EF of 20%  Patient was visibly upset and stating that he is tired to have these conversations  He states he wants to go home and be able to move around    He "states he feels he can go back on his 3 L of oxygen and be back to his normal   We discussed the severity of his COPD  -However, he made a statement of \" I do not want anyone punching on my chest\"  I did ask for clarification in the if he was referring to his CODE STATUS/cardiac resuscitation  He states he does not know  He did look to his wife for support  At this time they would like to have more time and further evaluate their decisions  I will given some time to think about their goals and will revisit  He is to remain full code unless he informs us otherwise  Decisional apparatus:  Patient is competent on my exam today  If competence is lost, patient's substitute decision maker would default to wife [place]  Advance Directive / Living Will / POLST: Not on file    [address] DO Shayla  Palliative and Supportive Care  Clinic/Answering Service: [phone]  You can find me on TigerConnect  Chief Complaint  Chief Complaint   Patient presents with   • Altered Mental Status     With sob as of 0930  Evaluated by vascular and sent to ED  Hx CHF COPD MI       Subjective:  Patient seen and examined today still requiring supplemental oxygen  Patient with notable increased work of breathing with tachypnea  Patient did have a slight cough with some congestion  Otherwise, patient states his breathing was slightly better today  He is voicing frustrations that he is stuck in bed and that he would like to go home sooner than later  Review of Systems   Constitutional: Negative for fever  HENT: Positive for congestion  Cardiovascular: Negative for chest pain, leg swelling and palpitations  Respiratory: Positive for cough and shortness of breath  Skin: Negative  Musculoskeletal: Negative for joint swelling and myalgias  Right leg pain   Gastrointestinal: Negative for abdominal pain, constipation, diarrhea, nausea and vomiting  Genitourinary: Negative for dysuria     Neurological: " Negative for headaches  Psychiatric/Behavioral: Negative  MEDICATIONS / ALLERGIES:     all current active meds have been reviewed, current meds:   Current Facility-Administered Medications   Medication Dose Route Frequency   • acetaminophen (TYLENOL) tablet 975 mg  975 mg Oral Q8H PRN   • aspirin (ECOTRIN LOW STRENGTH) EC tablet 81 mg  81 mg Oral Daily   • atorvastatin (LIPITOR) tablet 40 mg  40 mg Oral Daily With Dinner   • budesonide (PULMICORT) inhalation solution 0 5 mg  0 5 mg Nebulization Daily   • chlorhexidine (PERIDEX) 0 12 % oral rinse 15 mL  15 mL Mouth/Throat Q12H GABE   • cyanocobalamin (VITAMIN B-12) tablet 1,000 mcg  1,000 mcg Oral Daily   • docusate sodium (COLACE) capsule 100 mg  100 mg Oral BID   • doxycycline hyclate (VIBRAMYCIN) capsule 100 mg  100 mg Oral Q12H [address]   • enoxaparin (LOVENOX) subcutaneous injection 40 mg  40 mg Subcutaneous Daily   • formoterol (PERFOROMIST) nebulizer solution 20 mcg  20 mcg Nebulization Q12H   • guaiFENesin (MUCINEX) 12 hr tablet 1,200 mg  1,200 mg Oral Q12H GABE   • ipratropium (ATROVENT) 0 02 % inhalation solution 0 5 mg  0 5 mg Nebulization TID   • levalbuterol (XOPENEX) inhalation solution 1 25 mg  1 25 mg Nebulization TID   • melatonin tablet 3 mg  3 mg Oral HS   • methylPREDNISolone sodium succinate (Solu-MEDROL) injection 40 mg  40 mg Intravenous Q12H GABE   • sodium chloride (OCEAN) 0 65 % nasal spray 1 spray  1 spray Each Nare Q1H PRN   • tamsulosin (FLOMAX) capsule 0 4 mg  0 4 mg Oral Daily With Dinner   • torsemide (DEMADEX) tablet 20 mg  20 mg Oral Daily    and PTA meds:   Prior to Admission Medications   Prescriptions Last Dose Informant Patient Reported? Taking?    Diclofenac Sodium (VOLTAREN) 1 %  Spouse/Significant Other, Self Yes No   Sig: APPLY 2 GRAMS 4 TIMES A DAY   Melatonin 5 MG TABS  Spouse/Significant Other, Self Yes No   Sig: Take 1 tablet by mouth daily at bedtime   acetaminophen (TYLENOL) 325 mg tablet  Spouse/Significant Other, Self No No   Sig: Take 3 tablets (975 mg total) by mouth every 8 (eight) hours as needed for mild pain or moderate pain   albuterol (PROVENTIL HFA,VENTOLIN HFA) 90 mcg/act inhaler  Spouse/Significant Other, Self No No   Sig: Inhale 2 puffs every 6 (six) hours as needed for wheezing   aspirin 81 mg chewable tablet  Self, Spouse/Significant Other No No   Sig: Chew 1 tablet (81 mg total) daily   azithromycin (ZITHROMAX) 250 mg tablet  Self, Spouse/Significant Other No No   Sig: Take 1 tablet (250 mg total) by mouth 3 (three) times a week   budesonide (PULMICORT) 0 5 mg/2 mL nebulizer solution  Self, Spouse/Significant Other No No   Sig: TAKE 2 ML (0 5 MG TOTAL) BY NEBULIZATION TWICE A DAY RINSE MOUTH AFTER USE   cyanocobalamin (VITAMIN B-12) 1000 MCG tablet  Self, Spouse/Significant Other No No   Sig: Take 1 tablet (1,000 mcg total) by mouth daily   docusate sodium (COLACE) 100 mg capsule  Spouse/Significant Other, Self Yes No   Sig: Take 100 mg by mouth 2 (two) times a day   ergocalciferol (VITAMIN D2) 50,000 units  Self, Spouse/Significant Other No No   Sig: TAKE 1 CAPSULE (50,000 UNITS TOTAL) BY MOUTH EVERY 28 DAYS   formoterol (PERFOROMIST) 20 MCG/2ML nebulizer solution  Self, Spouse/Significant Other Yes No   Sig: TAKE 2 ML (20 MCG TOTAL) BY NEBULIZATION 2 (TWO) TIMES A DAY   furosemide (LASIX) 20 mg tablet   Yes No   Sig: Take 10 mg by mouth daily   guaiFENesin (MUCINEX) 600 mg 12 hr tablet  Self, Spouse/Significant Other No No   Sig: Take 2 tablets (1,200 mg total) by mouth every 12 (twelve) hours   ipratropium (ATROVENT) 0 02 % nebulizer solution  Self, Spouse/Significant Other No No   Sig: Take 2 5 mL (0 5 mg total) by nebulization 3 (three) times a day   ipratropium-albuterol (DUO-NEB) 0 5-2 5 mg/3 mL nebulizer solution   No No   Sig: USE 1 VIAL VIA NEBULIZER 3 TIMES A DAY   levalbuterol (XOPENEX) 1 25 mg/0 5 mL nebulizer solution  Spouse/Significant Other, Self No No   Sig: Take 0 5 mL (1 25 mg total) by nebulization 3 (three) times a day   predniSONE 5 mg tablet  Spouse/Significant Other, Self No No   Sig: Take 1 tablet (5 mg total) by mouth daily Do not start before March 5, 2023  rosuvastatin (CRESTOR) 10 MG tablet  Spouse/Significant Other, Self No No   Sig: Take 1 tablet (10 mg total) by mouth daily   senna-docusate sodium (SENOKOT S) 8 6-50 mg per tablet  Spouse/Significant Other, Self No No   Sig: Take 1 tablet by mouth daily at bedtime   Patient not taking: Reported on 6/1/2023   tamsulosin (FLOMAX) 0 4 mg  Spouse/Significant Other, Self No No   Sig: Take 1 capsule (0 4 mg total) by mouth daily with dinner   torsemide (DEMADEX) 20 mg tablet  Self, Spouse/Significant Other No No   Sig: Take 1 tablet (20 mg total) by mouth daily      Facility-Administered Medications: None       No Known Allergies    OBJECTIVE:    Physical Exam  Physical Exam  Vitals reviewed  Constitutional:       General: He is not in acute distress  Appearance: He is ill-appearing  He is not diaphoretic  Comments: Thin, frail, cachectic  On nasal cannula for supplemental oxygen  Tachypnea noted at rest    HENT:      Head: Normocephalic and atraumatic  Cardiovascular:      Rate and Rhythm: Normal rate  Pulmonary:      Comments: Tachypnea noted  Abdominal:      General: There is no distension  Palpations: Abdomen is soft  Tenderness: There is no abdominal tenderness  Musculoskeletal:         General: Tenderness (Right leg) present  No swelling  Skin:     General: Skin is warm and dry  Coloration: Skin is pale  Neurological:      Mental Status: He is alert and oriented to person, place, and time  Psychiatric:         Mood and Affect: Mood normal  Mood is not anxious  Speech: Speech normal          Behavior: Behavior normal  Behavior is not agitated  Cognition and Memory: Cognition is not impaired  Memory is not impaired  Lab Results:   I have personally reviewed pertinent labs  , "CBC: No results found for: \"ADJUSTEDWBC\", \"HCT\", \"HGB\", \"MCH\", \"MCHC\", \"MCV\", \"MPV\", \"NRBC\", \"PLT\", \"RBC\", \"RDW\", \"WBC\", CMP:   Lab Results   Component Value Date    BUN 29 (H) 06/07/2023    CALCIUM 9 1 06/07/2023    CL 87 (L) 06/07/2023    CO2 >45 (HH) 06/07/2023    CREATININE 0 95 06/07/2023    EGFR 83 06/07/2023    K 3 7 06/07/2023    SODIUM 133 (L) 06/07/2023   , BMP:  Lab Results   Component Value Date    AGAP  06/07/2023      Comment:      Unable to calculate    BUN 29 (H) 06/07/2023    CALCIUM 9 1 06/07/2023    CL 87 (L) 06/07/2023    CO2 >45 (HH) 06/07/2023    CREATININE 0 95 06/07/2023    EGFR 83 06/07/2023    GLUC 119 06/07/2023    K 3 7 06/07/2023    SODIUM 133 (L) 06/07/2023   , PT/PTT:No results found for: \"PT\", \"PTT\"  Imaging Studies: Reviewed  EKG, Pathology, and Other Studies: Reviewed    Counseling / Coordination of Care  Total floor / unit time spent today 50 minutes  Greater than 50% of total time was spent with the patient and / or family counseling and / or coordination of care  A description of the counseling / coordination of care: Goals of care    This note was shared with the patient  Rach Murray DO  Hospice and Palliative Care Fellow  Palliative & Supportive Care  Clinic/Answering Service: [phone]  You can find me on TigerConnect  Portions of this document may have been created using dictation software and as such some \"sound alike\" terms may have been generated by the system  Do not hesitate to contact me with any questions or clarifications      "

## 2023-06-07 NOTE — ASSESSMENT & PLAN NOTE
· History of chronic anemia  · No active sign of bleeding  · Trend CBC  · Restart home B12   · Transfuse if hemoglobin less than 7

## 2023-06-08 LAB
BUN SERPL-MCNC: 29 MG/DL (ref 5–25)
BUN SERPL-MCNC: 30 MG/DL (ref 5–25)
CALCIUM SERPL-MCNC: 8.3 MG/DL (ref 8.3–10.1)
CALCIUM SERPL-MCNC: 8.8 MG/DL (ref 8.3–10.1)
CHLORIDE SERPL-SCNC: 82 MMOL/L (ref 96–108)
CHLORIDE SERPL-SCNC: 88 MMOL/L (ref 96–108)
CO2 SERPL-SCNC: >45 MMOL/L (ref 21–32)
CO2 SERPL-SCNC: >45 MMOL/L (ref 21–32)
CREAT SERPL-MCNC: 0.82 MG/DL (ref 0.6–1.3)
CREAT SERPL-MCNC: 0.83 MG/DL (ref 0.6–1.3)
ERYTHROCYTE [DISTWIDTH] IN BLOOD BY AUTOMATED COUNT: 12.3 % (ref 11.6–15.1)
GFR SERPL CREATININE-BSD FRML MDRD: 91 ML/MIN/1.73SQ M
GFR SERPL CREATININE-BSD FRML MDRD: 92 ML/MIN/1.73SQ M
GLUCOSE SERPL-MCNC: 108 MG/DL (ref 65–140)
GLUCOSE SERPL-MCNC: 230 MG/DL (ref 65–140)
HCT VFR BLD AUTO: 33 % (ref 36.5–49.3)
HGB BLD-MCNC: 9.9 G/DL (ref 12–17)
MCH RBC QN AUTO: 30.5 PG (ref 26.8–34.3)
MCHC RBC AUTO-ENTMCNC: 30 G/DL (ref 31.4–37.4)
MCV RBC AUTO: 102 FL (ref 82–98)
PLATELET # BLD AUTO: 200 THOUSANDS/UL (ref 149–390)
PMV BLD AUTO: 10 FL (ref 8.9–12.7)
POTASSIUM SERPL-SCNC: 3.1 MMOL/L (ref 3.5–5.3)
POTASSIUM SERPL-SCNC: 4.1 MMOL/L (ref 3.5–5.3)
RBC # BLD AUTO: 3.25 MILLION/UL (ref 3.88–5.62)
SODIUM SERPL-SCNC: 129 MMOL/L (ref 135–147)
SODIUM SERPL-SCNC: 132 MMOL/L (ref 135–147)
WBC # BLD AUTO: 9.72 THOUSAND/UL (ref 4.31–10.16)

## 2023-06-08 PROCEDURE — 94003 VENT MGMT INPAT SUBQ DAY: CPT

## 2023-06-08 PROCEDURE — 94760 N-INVAS EAR/PLS OXIMETRY 1: CPT

## 2023-06-08 PROCEDURE — 99232 SBSQ HOSP IP/OBS MODERATE 35: CPT | Performed by: INTERNAL MEDICINE

## 2023-06-08 PROCEDURE — 94640 AIRWAY INHALATION TREATMENT: CPT

## 2023-06-08 PROCEDURE — 99232 SBSQ HOSP IP/OBS MODERATE 35: CPT | Performed by: HOSPITALIST

## 2023-06-08 PROCEDURE — 80048 BASIC METABOLIC PNL TOTAL CA: CPT | Performed by: INTERNAL MEDICINE

## 2023-06-08 PROCEDURE — 85027 COMPLETE CBC AUTOMATED: CPT | Performed by: HOSPITALIST

## 2023-06-08 RX ORDER — PREDNISONE 20 MG/1
20 TABLET ORAL DAILY
Status: DISCONTINUED | OUTPATIENT
Start: 2023-06-15 | End: 2023-06-10

## 2023-06-08 RX ORDER — PREDNISONE 10 MG/1
10 TABLET ORAL DAILY
Status: DISCONTINUED | OUTPATIENT
Start: 2023-06-18 | End: 2023-06-10

## 2023-06-08 RX ORDER — PREDNISONE 20 MG/1
40 TABLET ORAL DAILY
Status: DISCONTINUED | OUTPATIENT
Start: 2023-06-09 | End: 2023-06-10

## 2023-06-08 RX ADMIN — ASPIRIN 81 MG: 81 TABLET, COATED ORAL at 09:15

## 2023-06-08 RX ADMIN — ENOXAPARIN SODIUM 40 MG: 40 INJECTION SUBCUTANEOUS at 09:15

## 2023-06-08 RX ADMIN — DOXYCYCLINE 100 MG: 100 CAPSULE ORAL at 21:51

## 2023-06-08 RX ADMIN — IPRATROPIUM BROMIDE 0.5 MG: 0.5 SOLUTION RESPIRATORY (INHALATION) at 14:02

## 2023-06-08 RX ADMIN — DOXYCYCLINE 100 MG: 100 CAPSULE ORAL at 09:15

## 2023-06-08 RX ADMIN — FORMOTEROL FUMARATE DIHYDRATE 20 MCG: 20 SOLUTION RESPIRATORY (INHALATION) at 08:25

## 2023-06-08 RX ADMIN — CHLORHEXIDINE GLUCONATE 15 ML: 1.2 SOLUTION ORAL at 09:14

## 2023-06-08 RX ADMIN — FORMOTEROL FUMARATE DIHYDRATE 20 MCG: 20 SOLUTION RESPIRATORY (INHALATION) at 19:43

## 2023-06-08 RX ADMIN — LEVALBUTEROL HYDROCHLORIDE 1.25 MG: 1.25 SOLUTION RESPIRATORY (INHALATION) at 08:23

## 2023-06-08 RX ADMIN — TORSEMIDE 20 MG: 20 TABLET ORAL at 09:15

## 2023-06-08 RX ADMIN — LEVALBUTEROL HYDROCHLORIDE 1.25 MG: 1.25 SOLUTION RESPIRATORY (INHALATION) at 14:02

## 2023-06-08 RX ADMIN — BUDESONIDE 0.5 MG: 0.5 INHALANT ORAL at 08:21

## 2023-06-08 RX ADMIN — ATORVASTATIN CALCIUM 40 MG: 40 TABLET, FILM COATED ORAL at 17:26

## 2023-06-08 RX ADMIN — CYANOCOBALAMIN TAB 500 MCG 1000 MCG: 500 TAB at 09:15

## 2023-06-08 RX ADMIN — DOCUSATE SODIUM 100 MG: 100 CAPSULE, LIQUID FILLED ORAL at 17:26

## 2023-06-08 RX ADMIN — CHLORHEXIDINE GLUCONATE 15 ML: 1.2 SOLUTION ORAL at 21:51

## 2023-06-08 RX ADMIN — GUAIFENESIN 1200 MG: 600 TABLET, EXTENDED RELEASE ORAL at 21:51

## 2023-06-08 RX ADMIN — IPRATROPIUM BROMIDE 0.5 MG: 0.5 SOLUTION RESPIRATORY (INHALATION) at 19:43

## 2023-06-08 RX ADMIN — MELATONIN 3 MG: at 21:51

## 2023-06-08 RX ADMIN — DOCUSATE SODIUM 100 MG: 100 CAPSULE, LIQUID FILLED ORAL at 09:15

## 2023-06-08 RX ADMIN — TAMSULOSIN HYDROCHLORIDE 0.4 MG: 0.4 CAPSULE ORAL at 17:26

## 2023-06-08 RX ADMIN — LEVALBUTEROL HYDROCHLORIDE 1.25 MG: 1.25 SOLUTION RESPIRATORY (INHALATION) at 19:43

## 2023-06-08 RX ADMIN — IPRATROPIUM BROMIDE 0.5 MG: 0.5 SOLUTION RESPIRATORY (INHALATION) at 08:21

## 2023-06-08 RX ADMIN — GUAIFENESIN 1200 MG: 600 TABLET, EXTENDED RELEASE ORAL at 09:15

## 2023-06-08 RX ADMIN — METHYLPREDNISOLONE SODIUM SUCCINATE 40 MG: 40 INJECTION, POWDER, FOR SOLUTION INTRAMUSCULAR; INTRAVENOUS at 09:14

## 2023-06-08 NOTE — ASSESSMENT & PLAN NOTE
-chronic hyponatremia due to chronic HFrEF and 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 CA  -Currently Na 132  -Appreciate nephrology  -Continue increased solute intake, fluid restriction, diuretics     -s/p 7 5mg Samsca on 6/6/23 and 6/7/23

## 2023-06-08 NOTE — PROGRESS NOTES
PULMONOLOGY PROGRESS NOTE     Name: Ej Galaviz  Age & Sex: 79 y o  male   MRN: [id number]  [facility]   Encounter: [id number]    PATIENT INFORMATION     Name: Ej Galaviz  Age & Sex: 79 y o  male   MRN: [id number]  Hospital Stay Days: 7    ASSESSMENT/PLAN     Assessment:   1  Acute on chronic respiratory failure with hypoxia and hypercapnia  2  Acute on chronic heart failure with reduced ejection fraction  3  Acute exacerbation of COPD  4  Very severe COPD, end-stage  5  KEVIN/COPD overlap  6  Concern for pneumonia     Plan:  • Continue supplemental oxygen to maintain SPO2 greater than 88%  Patient continues to require high flow NC but able to wean down to 40L at 40% this morning  Will wean and continue to bring down to midflow today  • Continue BiPAP at night due to likely KEVIN/COPD with severe risk for hypercapnia and respiratory failure  • Continue bronchodilators with Xopenex/Atrovent and Perforomist/Pulmicort     • On Solu-Medrol 40 mg twice daily as he was not actively wheezing on examination today  Will transition to prednisone 40 mg oral today and taper before discontinuing  • Continue diuresis as likely his heart failure in combination with end stage COPD is likely causing his acute presentation  • He was started on broad-spectrum antibiotics on 6/3 due to elevated drip score   Procalcitonin has been grossly unremarkable   MRSA nares culture positive   Currently is on doxycycline  Blood cultures are negative 5 days   Recommend discontinuation of vancomycin and cefepime   Can continue doxycycline for 5-day course for tracheobronchitis  • Multiple discussions previously with hospice/palliative care  Patient seems to be uncertain with his degree of acceptance of his current conditions and likelihood of meaningful recovery  Palliative care input is greatly appreciated  Palliative team has been consulted and currently ongoing goals of care discussion taking place   Continuing [facility] discussion with wife Karolina Choi and patient      SUBJECTIVE     Patient seen and examined, lying in bed   No acute events overnight  Still on high flow nasal canula but weaned this AM to 40L at 40%  Patient tolerated this and saturated 97-99%  Will plan to wean down to midflow as patient tolerates today  Otherwise patient is doing ok, he reiterates being frustrated with the high flow tubing  Optimized patient's position so this does not bother him  He expresses wanting to go home and be able to live at home with his family  Wife Oralia Mast is at bedside and states that he wants that too  She is still thinking about the options and discussed with palliative yesterday  Will continue to consider before making decisions  Appreciate palliatives assistance with ongoing goals of care discussions  Patient denies any fevers, chills, CP, lightheadedness, dizziness,   Has some shortness of breath  OBJECTIVE     Vitals:    23 0600 23 0826 23 0827 23 1155   BP:       BP Location:       Pulse:    82   Resp:       Temp:       TempSrc:       SpO2:  100% 100% 99%   Weight: 48 1 kg (106 lb 0 3 oz)      Height:          Temperature:   Temp (24hrs), Av 1 °F (36 7 °C), Min:97 7 °F (36 5 °C), Max:98 5 °F (36 9 °C)    Temperature: 97 7 °F (36 5 °C)  Intake & Output:  I/O       / 0701  06/07 0700 / 0701  / 0700 / 0701  / 0700    P  O  780 480 118    Total Intake(mL/kg) 780 (16 5) 480 (10) 118 (2 5)    Urine (mL/kg/hr) 2500 (2 2) 2050 (1 8)     Stool 0      Total Output 2500      Net -1720 -1570 +118           Unmeasured Urine Occurrence 1 x      Unmeasured Stool Occurrence 0 x          Weights:   IBW (Ideal Body Weight): 50 kg    Body mass index is 20 71 kg/m²  Weight (last 2 days)     Date/Time Weight    23 06 48 1 (106 02)    23 06 47 2 (104)        Physical Exam  Vitals reviewed  Constitutional:       General: He is not in acute distress       Appearance: He is cachectic  He is ill-appearing  Eyes:      General:         Right eye: No discharge  Left eye: No discharge  Conjunctiva/sclera: Conjunctivae normal    Cardiovascular:      Rate and Rhythm: Normal rate and regular rhythm  Pulses: Normal pulses  Heart sounds: Normal heart sounds  No murmur heard  No gallop  Pulmonary:      Effort: Tachypnea present  No respiratory distress  Breath sounds: Decreased air movement present  Comments: Decreased breath sounds bilaterally  On high flow NC saturating well on exam, weaned down to 40L at 40%  Abdominal:      General: Bowel sounds are normal  There is no distension  Palpations: Abdomen is soft  Musculoskeletal:      Right lower leg: No edema  Left lower leg: No edema  Skin:     General: Skin is warm and dry  Neurological:      Mental Status: He is oriented to person, place, and time  Psychiatric:         Attention and Perception: Attention normal       Comments: Upset, frustrated  Expressing wanting to go home and be with family           LABORATORY DATA     Labs: I have personally reviewed pertinent reports  Results from last 7 days   Lab Units 06/08/23  0530 06/05/23  0539 06/04/23  0454 06/03/23  0534 06/02/23  0606   EOS PCT %  --  0 0  --  0   HEMATOCRIT % 33 0* 32 5* 32 8*   < > 33 5*   HEMOGLOBIN g/dL 9 9* 10 8* 11 0*   < > 10 6*   MONOS PCT %  --  7 9  --  22*   NEUTROS PCT %  --  89* 85*  --  68   PLATELETS Thousands/uL 200 175 186   < > 155   WBC Thousand/uL 9 72 8 15 5 19   < > 3 96*    < > = values in this interval not displayed        Results from last 7 days   Lab Units 06/08/23  0852 06/07/23  2048 06/07/23  1018   BUN mg/dL 29* 32* 29*   CALCIUM mg/dL 8 8 8 6 9 1   CHLORIDE mmol/L 88* 85* 87*   CO2 mmol/L >45* >45* >45*   CREATININE mg/dL 0 82 0 87 0 95   POTASSIUM mmol/L 4 1 3 6 3 7     Results from last 7 days   Lab Units 06/05/23  0539 06/04/23  0454 06/02/23  1340   MAGNESIUM mg/dL 1 6 1 8 2 0 Results from last 7 days   Lab Units 06/05/23  0539 06/04/23  0454 06/02/23  1340   PHOSPHORUS mg/dL 2 9 3 0 3 6      Results from last 7 days   Lab Units 06/01/23  1245   INR  0 84   PTT seconds 26     Results from last 7 days   Lab Units 06/01/23  1245   LACTIC ACID mmol/L 1 7             ABG:       Micro:   Results from last 7 days   Lab Units 06/02/23  1120 06/01/23  1245   BLOOD CULTURE   --  No Growth After 5 Days  No Growth After 5 Days  MRSA CULTURE ONLY  Methicillin Resistant Staphylococcus aureus isolated*  --          IMAGING & DIAGNOSTIC TESTING     Radiology Results: I have personally reviewed pertinent reports  XR foot 3+ vw right    Result Date: 6/6/2023  Impression: No acute osseous abnormality  Workstation performed: [id number]     XR chest 1 view portable    Result Date: 6/2/2023  Impression: 1  Mild right basilar airspace opacity concerning for pneumonia  2   Background emphysematous changes  Interpretation concordant with preliminary findings from the emergency department  Workstation performed: [id number]     Other Diagnostic Testing: I have personally reviewed pertinent reports      ACTIVE MEDICATIONS     Current Facility-Administered Medications   Medication Dose Route Frequency   • acetaminophen (TYLENOL) tablet 975 mg  975 mg Oral Q8H PRN   • aspirin (ECOTRIN LOW STRENGTH) EC tablet 81 mg  81 mg Oral Daily   • atorvastatin (LIPITOR) tablet 40 mg  40 mg Oral Daily With Dinner   • budesonide (PULMICORT) inhalation solution 0 5 mg  0 5 mg Nebulization Daily   • chlorhexidine (PERIDEX) 0 12 % oral rinse 15 mL  15 mL Mouth/Throat Q12H GABE   • cyanocobalamin (VITAMIN B-12) tablet 1,000 mcg  1,000 mcg Oral Daily   • docusate sodium (COLACE) capsule 100 mg  100 mg Oral BID   • doxycycline hyclate (VIBRAMYCIN) capsule 100 mg  100 mg Oral Q12H [address]   • enoxaparin (LOVENOX) subcutaneous injection 40 mg  40 mg Subcutaneous Daily   • formoterol (PERFOROMIST) nebulizer solution 20 mcg  20 mcg "Nebulization Q12H   • guaiFENesin (MUCINEX) 12 hr tablet 1,200 mg  1,200 mg Oral Q12H GABE   • ipratropium (ATROVENT) 0 02 % inhalation solution 0 5 mg  0 5 mg Nebulization TID   • levalbuterol (XOPENEX) inhalation solution 1 25 mg  1 25 mg Nebulization TID   • melatonin tablet 3 mg  3 mg Oral HS   • [START ON 6/9/2023] predniSONE tablet 40 mg  40 mg Oral Daily    Followed by   • [START ON 6/12/2023] predniSONE tablet 30 mg  30 mg Oral Daily    Followed by   • [START ON 6/15/2023] predniSONE tablet 20 mg  20 mg Oral Daily    Followed by   • [START ON 6/18/2023] predniSONE tablet 10 mg  10 mg Oral Daily   • sodium chloride (OCEAN) 0 65 % nasal spray 1 spray  1 spray Each Nare Q1H PRN   • tamsulosin (FLOMAX) capsule 0 4 mg  0 4 mg Oral Daily With Dinner   • torsemide (DEMADEX) tablet 20 mg  20 mg Oral Daily         Disclaimer: Portions of the record may have been created with voice recognition software  Occasional wrong word or \"sound a like\" substitutions may have occurred due to the inherent limitations of voice recognition software  Careful consideration should be taken to recognize, using context, where substitutions have occurred      Jenn Mendez MD   PGY-1, [facility] Transitional [facility] Residency       "

## 2023-06-08 NOTE — ASSESSMENT & PLAN NOTE
· History of chronic anemia  · No active sign of bleeding  · Trend CBC  · cont home B12   · Transfuse if hemoglobin less than 7

## 2023-06-08 NOTE — CASE MANAGEMENT
Case Management Discharge Planning Note    Patient name Karlene Gan    [facility] - MRN [id number]  : 1955 Date 2023       Current Admission Date: 2023  Current Admission Diagnosis:Chronic obstructive pulmonary disease [facility])   Patient Active Problem List    Diagnosis Date Noted   • Pneumonia 2023   • Acute exacerbation of congestive [facility] failure ([facility] ) 2023   • Moderate protein-calorie malnutrition ([facility] ) 2023   • HLD (hyperlipidemia) 2023   • COPD exacerbation ([place] [address] ) 2023   • Hyperglycemia 2023   • Physical deconditioning 2023   • Anemia 2023   • Hyponatremia 2023   • Chronic HFrEF (heart failure with reduced ejection fraction) ([place] [address] ) 2022   • Protein-calorie malnutrition ([facility] ) 2022   • Pulmonary nodules/lesions, multiple 2022   • Prostate cancer ([place] [address] ) 2021   • BPH with obstruction/lower urinary tract symptoms 2021   • Goals of care, counseling/discussion 2021   • Chronic respiratory failure with hypoxia and hypercapnia ([place] [address] ) 2020   • Macrocytosis without anemia 2019   • Other insomnia 2019   • Gastroesophageal reflux disease 2017   • Nonobstructive atherosclerosis of coronary artery 2016   • Mood disorder ([place] [address] ) 2015   • Impaired fasting glucose 2015   • Osteoporosis 10/14/2014   • Vitamin D deficiency 10/14/2014   • Nonischemic cardiomyopathy ([place] [address] ) 2014   • Left bundle-branch block 2013   • Osteoarthritis 2013   • KEVIN and COPD overlap syndrome ([facility] ) 2013   • Chronic obstructive pulmonary disease ([facility] ) 2012      LOS (days): 7  Geometric Mean LOS (GMLOS) (days): 4 00  Days to GMLOS:-2 8     OBJECTIVE:  Risk of Unplanned Readmission Score: 34 16         Current admission status: Inpatient   Preferred Pharmacy:   [facility] #[id number] Galeazzi, [address] [place] [id number]  Phone: [phone] Fax: [phone]    Primary Care Provider: Sukumar Snowden DO    Primary Insurance: BLUE CROSS  Secondary Insurance: MEDICARE    DISCHARGE DETAILS:    Pt not medically stable at this time for d/c  Pt still having high oxygen requirements   Plan remains a home d/c with [facility]

## 2023-06-08 NOTE — ASSESSMENT & PLAN NOTE
Wt Readings from Last 3 Encounters:   06/08/23 48 1 kg (106 lb 0 3 oz)   06/01/23 46 7 kg (103 lb)   05/19/23 46 8 kg (103 lb 2 8 oz)     · Echo on 5/1/2023 - LVEF 20%, severe global hypokinesis, abnormal mildly septal motion consistent with left bundle branch block and diastolic flattening of interventricular septum consistent with right ventricule volume overload  ·   · S/p 2 doses of IV Lasix 40 mg  · Continue home torsemide 20 mg daily  · Daily weights and strict ins and outs

## 2023-06-08 NOTE — ASSESSMENT & PLAN NOTE
· Currently with acute exacerbation  · Pt reports he smoked 2-3ppd for approx 45 years  · History of severe COPD with multiple admissions for COPD exacerbation  · PFT on 9/16/2020 showed FEV1/FVC 30%, FVC 59% predicted and FEV1 22% predicted  · Presented with respiratory distress requiring BiPAP  · Currently on high flow (50L/57%) at daytime and BiPAP at night  · Continue Pulmicort, formoterol, Atrovent, Xopenex  · Was on Solu-Medrol 40 mg every 12 hours, now transitioned to Prednisone taper  · Pulmonology following  · Patient has end-stage COPD and has been seen by palliative care  He continues to want aggressive measures and to remain full code

## 2023-06-08 NOTE — PROGRESS NOTES
[address] [facility]  Progress Note  Name: Roxie Bone  MRN: [id number]  Unit/Bed#: [facility] 205-01 I Date of Admission: 6/1/2023   Date of Service: 6/8/2023 I Hospital Day: 7    Assessment/Plan   Pneumonia  Assessment & Plan  -CXR (reviewed by me) 6/1/23: Mild right basilar airspace opacity concerning for pneumonia  Background emphysematous changes  -pt was on Vancomycin/cefepime, now on Doxy (MRSA POS nares)  -BCxs NGTD  -pulm following  -Speech therapy consulted, no change in diet recommended    Acute exacerbation of congestive heart failure (HCC)  Assessment & Plan  Wt Readings from Last 3 Encounters:   06/08/23 48 1 kg (106 lb 0 3 oz)   06/01/23 46 7 kg (103 lb)   05/19/23 46 8 kg (103 lb 2 8 oz)     · Echo on 5/1/2023 - LVEF 20%, severe global hypokinesis, abnormal mildly septal motion consistent with left bundle branch block and diastolic flattening of interventricular septum consistent with right ventricule volume overload  ·   · S/p 2 doses of IV Lasix 40 mg  · Continue home torsemide 20 mg daily  · Daily weights and strict ins and outs  * Chronic obstructive pulmonary disease (HCC)  Assessment & Plan  · Currently with acute exacerbation  · Pt reports he smoked 2-3ppd for approx 45 years  · History of severe COPD with multiple admissions for COPD exacerbation  · PFT on 9/16/2020 showed FEV1/FVC 30%, FVC 59% predicted and FEV1 22% predicted  · Presented with respiratory distress requiring BiPAP  · Currently on high flow (50L/57%) at daytime and BiPAP at night  · Continue Pulmicort, formoterol, Atrovent, Xopenex  · Was on Solu-Medrol 40 mg every 12 hours, now transitioned to Prednisone taper  · Pulmonology following  · Patient has end-stage COPD and has been seen by palliative care  He continues to want aggressive measures and to remain full code      Hyponatremia  Assessment & Plan  -chronic hyponatremia due to chronic HFrEF and 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 CA  -Currently Na 132  -Appreciate nephrology  -Continue increased solute intake, fluid restriction, diuretics  -s/p 7 5mg Samsca on 23 and 23      Osteoporosis  Assessment & Plan  · DEXA scan on 2019 showed osteoporosis  · Not on any medications  Previously on bisphosphonate  · Follow-up outpatient with PCP  KEVIN and COPD overlap syndrome (HCC)  Assessment & Plan  · Continue BiPAP at night    Anemia  Assessment & Plan  · History of chronic anemia  · No active sign of bleeding  · Trend CBC  · cont home B12   · Transfuse if hemoglobin less than 7  VTE Pharmacologic Prophylaxis:   Lovenox    Patient Centered Rounds: I performed bedside rounds with nursing staff today  Total Time Spent on Date of Encounter in care of patient: 35 minutes This time was spent on one or more of the following: performing physical exam; counseling and coordination of care; obtaining or reviewing history; documenting in the medical record; reviewing/ordering tests, medications or procedures; communicating with other healthcare professionals and discussing with patient's family/caregivers  Current Length of Stay: 7 day(s)  Current Patient Status: Inpatient   Certification Statement: The patient will continue to require additional inpatient hospital stay due to COPD Exac  Discharge Plan: Anticipate discharge in 48-72 hrs to home with home services  Code Status: Level 1 - Full Code    Subjective:   Pt denies SOB at this moment  Objective:     Vitals:   Temp (24hrs), Av 1 °F (36 7 °C), Min:97 7 °F (36 5 °C), Max:98 5 °F (36 9 °C)    Temp:  [97 7 °F (36 5 °C)-98 5 °F (36 9 °C)] 97 7 °F (36 5 °C)  HR:  [82-94] 82  BP: ()/(60-73) 97/62  SpO2:  [96 %-100 %] 99 %  Body mass index is 20 71 kg/m²  Input and Output Summary (last 24 hours):      Intake/Output Summary (Last 24 hours) at 2023 1322  Last data filed at 2023 0938  Gross per 24 hour   Intake 598 ml   Output 1680 ml   Net -1082 ml Physical Exam:   Gen: appears in mildly SOB, minimally using accessory muscles to breathe, AAOx3, appears chronically ill, cachectic, malnourished  Eyes: EOMI, PERRLA, no scleral icterus  ENMT:  no nasal discharge, no otic discharge, moist mucous membranes  Neck:  Supple  Cardiovascular:  Regular rate and rhythm, normal S1-S2, no murmurs, rubs, or gallops  Lungs:  CTAB anteriorly with distant BS  Abdomen: continues to remain Positive bowel sounds, soft, nontender, nondistended   Skin:  Intact, no obvious lesions or rashes  Neuro: Cranial nerves 2-12 are intact, non-focal    Additional Data:     Labs:  Results from last 7 days   Lab Units 06/08/23  0530 06/05/23  0539   EOS PCT %  --  0   HEMATOCRIT % 33 0* 32 5*   HEMOGLOBIN g/dL 9 9* 10 8*   LYMPHS PCT %  --  4*   MONOS PCT %  --  7   NEUTROS PCT %  --  89*   PLATELETS Thousands/uL 200 175   WBC Thousand/uL 9 72 8 15     Results from last 7 days   Lab Units 06/08/23  0852 06/07/23  1018 06/06/23  2100   ANION GAP mmol/L  --   --  1*   BUN mg/dL 29*   < > 27*   CALCIUM mg/dL 8 8   < > 8 7   CHLORIDE mmol/L 88*   < > 85*   CO2 mmol/L >45*   < > 43*   CREATININE mg/dL 0 82   < > 0 85   GLUCOSE RANDOM mg/dL 108   < > 116   POTASSIUM mmol/L 4 1   < > 3 4*   SODIUM mmol/L 132*   < > 129*    < > = values in this interval not displayed                   Results from last 7 days   Lab Units 06/03/23  0534 06/02/23  0606   PROCALCITONIN ng/ml 0 22 0 27*       Lines/Drains:  Invasive Devices     Peripheral Intravenous Line  Duration           Peripheral IV 06/05/23 Left;Proximal;Ventral (anterior) Forearm 3 days          Drain  Duration           External Urinary Catheter Small 6 days                Recent Cultures (last 7 days):         Last 24 Hours Medication List:   Current Facility-Administered Medications   Medication Dose Route Frequency Provider Last Rate   • acetaminophen  975 mg Oral Q8H PRN Juan Carlos Barrera Reveal, DO     • aspirin  81 mg Oral Daily Ang Holly Reveal, DO     • atorvastatin  40 mg Oral Daily With [facility], DO     • budesonide  0 5 mg Nebulization Daily Ang Chintan Escobar, DO     • chlorhexidine  15 mL Mouth/Throat Q12H [address] Ang Chintan Loveon, DO     • cyanocobalamin  1,000 mcg Oral Daily Matthew Bailon MD     • docusate sodium  100 mg Oral BID Ang Darlys Boulder, DO     • doxycycline hyclate  100 mg Oral Q12H [address] Ang Chintan Escobar, DO     • enoxaparin  40 mg Subcutaneous Daily Ang Lopezlys Boulder, DO     • formoterol  20 mcg Nebulization Q12H Ang Darlys Escobar, DO     • guaiFENesin  1,200 mg Oral Q12H [address] Ang Chintan [place], DO     • ipratropium  0 5 mg Nebulization TID Juan Carlos Woodson Escobar, DO     • levalbuterol  1 25 mg Nebulization TID Ang Lopezlys Escobar, DO     • melatonin  3 mg Oral HS Ang Chintan Cody, DO     • [START ON 6/9/2023] predniSONE  40 mg Oral Daily Atha Men, DO      Followed by   • [START ON 6/12/2023] predniSONE  30 mg Oral Daily Atha Men, DO      Followed by   • [START ON 6/15/2023] predniSONE  20 mg Oral Daily Atha Men, DO      Followed by   • [START ON 6/18/2023] predniSONE  10 mg Oral Daily Atha Men, DO     • sodium chloride  1 spray Each Nare Q1H PRN Juan Carlos Cody, DO     • tamsulosin  0 4 mg Oral Daily With Dinner Juan Carlos Cody, DO     • torsemide  20 mg Oral Daily Juan Carlos Cody, DO          Today, Patient Was Seen By: Matthew Bailon MD    **Please Note: This note may have been constructed using a voice recognition system  **

## 2023-06-08 NOTE — PROGRESS NOTES
Progress note - Palliative and Supportive Care   Miriam Govea  79 y o  male [id number]    Patient Active Problem List   Diagnosis   • Nonobstructive atherosclerosis of coronary artery   • Nonischemic cardiomyopathy (HCC)   • Chronic obstructive pulmonary disease (HCC)   • Left bundle-branch block   • KEVIN and COPD overlap syndrome ([facility])   • Gastroesophageal reflux disease   • Impaired fasting glucose   • Osteoarthritis   • Osteoporosis   • Vitamin D deficiency   • Mood disorder ([facility])   • Other insomnia   • Macrocytosis without anemia   • Chronic respiratory failure with hypoxia and hypercapnia ([facility])   • Goals of care, counseling/discussion   • BPH with obstruction/lower urinary tract symptoms   • Prostate cancer ([facility] )   • Pulmonary nodules/lesions, multiple   • Protein-calorie malnutrition ([facility])   • Chronic HFrEF (heart failure with reduced ejection fraction) ([facility])   • Anemia   • Hyponatremia   • Physical deconditioning   • Hyperglycemia   • COPD exacerbation ([facility])   • HLD (hyperlipidemia)   • Moderate protein-calorie malnutrition ([facility])   • Acute exacerbation of congestive heart failure ([facility] )   • Pneumonia     Active issues specifically addressed today include: pain, emotional support      Plan:  1   Symptom management -   # Pain               - Acetaminophen 975mg q8hrs prn mild or moderate pain                          - has not used since 6/5              - Prevalon boots helping with pain when worn     # Shortness of Breath              - HFNC 50L at 55%    - Discussed with pulmonology, they are going to try and wean him to [facility] today              - Budesonide/Formoterol/Ipratropium/Levalbuterol nebs    - Solu-Medrol 40mg IV BID, doxycycline 100mg po q12hrs   - Mucinex 1200mg q12hrs     # Psychosocial               - Support provided   - Explained that he is on a high amount of oxygen and it would not be easy to send him home on this amount of oxygen as his equipment at home can not duplicate this high of an "oxygen demand     - Again discussed code status with patient and wife  He again states he doesn't \"want pounding on his chest,\" but after he talked with his wife he says he \"will give it one more chance\" and wants to remain a Level 1 Full Code as of today  2) Goals of Care / Advanced Care Planning      Code Status: Full Code - Level 1     Decisional apparatus:  Patient is competent on my exam today  If competence is lost, patient's substitute decision maker would default to wife [place]  Advance Directive / Living Will / POLST:  None on file    Chief Complaint  Chief Complaint   Patient presents with   • Altered Mental Status     With sob as of 0930  Evaluated by vascular and sent to ED  Hx CHF COPD MI       Subjective:  Patient resting in bed  He is frustrated with being in the hospital stuck in bed  He states he just wants to go home and be able to move around as he pleases, rather than being stuck in a small hospital bed  Says he is not sleeping very well as the BiPAP mask does not fit as well as his home mask  He denies any significant pain, he no longer has cramping in his calves as he is not wearing the prevalon boots this morning  Review of Systems   Respiratory: Positive for cough and shortness of breath  Musculoskeletal: Negative for back pain, joint pain and neck pain  MEDICATIONS / ALLERGIES:     all current active meds have been reviewed    No Known Allergies    OBJECTIVE:    Physical Exam  Physical Exam  Constitutional:       General: He is not in acute distress  Appearance: He is ill-appearing  Comments: cachectic    HENT:      Mouth/Throat:      Mouth: Mucous membranes are moist    Cardiovascular:      Rate and Rhythm: Normal rate  Pulmonary:      Comments: Labored breathing, barrel chested  Musculoskeletal:      Right lower leg: No edema  Left lower leg: No edema  Neurological:      Mental Status: He is alert  Mental status is at baseline   " "  Psychiatric:         Mood and Affect: Mood normal          Lab Results: I have personally reviewed pertinent labs  Counseling / Coordination of Care  Total floor / unit time spent today 30 minutes  Greater than 50% of total time was spent with the patient and / or family counseling and / or coordination of care  Portions of this document may have been created using dictation software and as such some \"sound alike\" terms may have been generated by the system  Do not hesitate to contact me with any questions or clarifications      "

## 2023-06-08 NOTE — PROGRESS NOTES
NEPHROLOGY PROGRESS NOTE   Kulwinder Sheldon  79 y o  male MRN: [id number]  Unit/Bed#: [facility] 205-01 Encounter: [id number]        ASSESSMENT & PLAN     1   Acute on chronic hyponatremia  • This is in the setting of low output congestive heart failure and prostate cancer which likely has high ADH release his sodium level is slowly rising maintained on oral torsemide and fluid restriction  • His sodium level improved to 132, his potassium has improved, his chloride is low, his bicarb is up to 45 but his pH is normal   At this point given that his sodium is above 132, he has a low chloride and I bicarb we will hold off on another dose of tolvaptan today and we will repeat a BMP tomorrow  2   Acute on chronic hypoxic respiratory failure  • He is currently being treated for COPD exacerbation, he is currently also being diuresed  • Unfortunately his breathing remains poor  • He states this is may be slightly better  • Palliative care is seeing him  3   Congestive heart failure with a reduced ejection fraction  • Continue diuresis,monitor ins and outs and daily weights  • Making more urine, but bed scale weight did increase  • Chest x-ray shows mild right basilar airspace opacity and background emphysematous changes  • Holding tolvaptan today  4   Frailty and cachexia  • Continue nutritional optimization  • Okay to take Ensure 3 times daily      SUBJECTIVE:    Ongoing goals of care discussions, patient remains a level 1 full code  His sodium level is improved although his symptoms from a respiratory standpoint have not  He states he has an appetite and is tolerating some p o  intake    12 point review of systems was otherwise negative besides what is mentioned above      Medications:    Current Facility-Administered Medications:   •  acetaminophen (TYLENOL) tablet 975 mg, 975 mg, Oral, Q8H PRN, Juan Carlos Delaney DO, 975 mg at 06/05/23 8840  •  aspirin (ECOTRIN LOW STRENGTH) EC tablet 81 mg, 81 mg, Oral, Daily, Ang Dianne Randall Erica, DO, 81 mg at 06/08/23 0915  •  atorvastatin (LIPITOR) tablet 40 mg, 40 mg, Oral, Daily With Dinner, Ang Ned Jennifer, DO, 40 mg at 06/07/23 1719  •  budesonide (PULMICORT) inhalation solution 0 5 mg, 0 5 mg, Nebulization, Daily, Ang Ned Jennifer, DO, 0 5 mg at 06/08/23 8359  •  chlorhexidine (PERIDEX) 0 12 % oral rinse 15 mL, 15 mL, Mouth/Throat, Q12H [address], Ang Ned Jennifer, DO, 15 mL at 06/08/23 7224  •  cyanocobalamin (VITAMIN B-12) tablet 1,000 mcg, 1,000 mcg, Oral, Daily, Sheela Le MD, 1,000 mcg at 06/08/23 0915  •  docusate sodium (COLACE) capsule 100 mg, 100 mg, Oral, BID, Ang Ned Jennifer, DO, 100 mg at 06/08/23 0915  •  doxycycline hyclate (VIBRAMYCIN) capsule 100 mg, 100 mg, Oral, Q12H [address], Ang Ned Jennifer, DO, 100 mg at 06/08/23 0915  •  enoxaparin (LOVENOX) subcutaneous injection 40 mg, 40 mg, Subcutaneous, Daily, Ang Ned Jennifer, DO, 40 mg at 06/08/23 0915  •  formoterol (PERFOROMIST) nebulizer solution 20 mcg, 20 mcg, Nebulization, Q12H, Ang Ned Jennifer, DO, 20 mcg at 06/08/23 0825  •  guaiFENesin (MUCINEX) 12 hr tablet 1,200 mg, 1,200 mg, Oral, Q12H [address], Ang Ned Jennifer, DO, 1,200 mg at 06/08/23 0915  •  ipratropium (ATROVENT) 0 02 % inhalation solution 0 5 mg, 0 5 mg, Nebulization, TID, Ang Ned Jennifer, DO, 0 5 mg at 06/08/23 0611  •  levalbuterol (XOPENEX) inhalation solution 1 25 mg, 1 25 mg, Nebulization, TID, Ang Ned Jennifer, DO, 1 25 mg at 06/08/23 4485  •  melatonin tablet 3 mg, 3 mg, Oral, HS, Juan Carlos Rodriguez DO, 3 mg at 06/07/23 2243  •  [START ON 6/9/2023] predniSONE tablet 40 mg, 40 mg, Oral, Daily **FOLLOWED BY** [START ON 6/12/2023] predniSONE tablet 30 mg, 30 mg, Oral, Daily **FOLLOWED BY** [START ON 6/15/2023] predniSONE tablet 20 mg, 20 mg, Oral, Daily **FOLLOWED BY** [START ON 6/18/2023] predniSONE tablet 10 mg, 10 mg, Oral, Daily, Vernadine Croon, DO  •  sodium chloride (OCEAN) 0 65 % nasal spray 1 spray, 1 spray, Each Nare, Q1H PRN, Juan Carlos Rodriguez DO, 1 spray at 06/04/23 0654  •  tamsulosin (FLOMAX) capsule 0 4 mg, 0 4 mg, Oral, Daily With Dinner, Ang Reggie Amass, DO, 0 4 mg at 06/07/23 1719  •  torsemide [facility]) tablet 20 mg, 20 mg, Oral, Daily, Ang Reggie Amass, DO, 20 mg at 06/08/23 0915    OBJECTIVE:    Vitals:    06/08/23 0258 06/08/23 0600 06/08/23 0826 06/08/23 0827   BP:       BP Location:       Pulse:       Resp:       Temp: 97 7 °F (36 5 °C)      TempSrc: Axillary      SpO2:   100% 100%   Weight:  48 1 kg (106 lb 0 3 oz)     Height:            Temp:  [97 7 °F (36 5 °C)-98 5 °F (36 9 °C)] 97 7 °F (36 5 °C)  HR:  [86-94] 86  BP: ()/(60-73) 97/62  SpO2:  [96 %-100 %] 100 %     Body mass index is 20 71 kg/m²  Weight (last 2 days)     Date/Time Weight    06/08/23 0600 48 1 (106 02)    06/06/23 0600 47 2 (104)          I/O last 3 completed shifts: In: 780 [P O :780]  Out: 2550 [Urine:2550]    I/O this shift:  In: 118 [P O :118]  Out: -       Physical exam:    General: no acute distress, cooperative  Eyes: conjunctivae pink, anicteric sclerae  ENT: lips and mucous membranes moist, no exudates, normal external ears  Neck: ROM intact, no JVD  Chest: Decreased breath sounds  CVS: normal rate, non pericardial friction rub  Abdomen: soft, non-tender, non-distended, normoactive bowel sounds  Extremities: no edema of both legs  Skin: no rash  Neuro: awake, alert, oriented, grossly intact  Psych:  Pleasant affect    Invasive Devices:      Lab Results:   Results from last 7 days   Lab Units 06/08/23  0852 06/08/23  0530 06/07/23  2048 06/07/23  1018 06/06/23  2100 06/06/23  1545 06/05/23  0820 06/05/23  0539 06/04/23  0929 06/04/23  0454 06/03/23  1111 06/03/23  0534 06/03/23  0057 06/02/23  1340 06/02/23  0853 06/02/23  0606 06/01/23  1704 06/01/23  1245   BLOOD CULTURE   --   --   --   --   --   --   --   --   --   --   --   --   --   --   --   --   --  No Growth After 5 Days  No Growth After 5 Days     BUN mg/dL 29*  --  32* 29* 27* 25   < >  --    < > "--    < > 25   < > 23   < > 23   < >  --    CALCIUM mg/dL 8 8  --  8 6 9 1 8 7 8 5   < >  --    < >  --    < > 9 3   < > 9 1   < > 8 5   < >  --    CHLORIDE mmol/L 88*  --  85* 87* 85* 84*   < >  --    < >  --    < > 83*   < > 85*   < > 84*   < >  --    CO2 mmol/L >45*  --  >45* >45* 43* 44*   < >  --    < >  --    < > 43*   < > 41*   < > 42*   < >  --    CREATININE mg/dL 0 82  --  0 87 0 95 0 85 1 05   < >  --    < >  --    < > 0 61   < > 0 65   < > 0 66   < >  --    HEMATOCRIT %  --  33 0*  --   --   --   --   --  32 5*  --  32 8*  --  33 9*  --   --   --  33 5*  --   --    HEMOGLOBIN g/dL  --  9 9*  --   --   --   --   --  10 8*  --  11 0*  --  11 3*  --   --   --  10 6*  --   --    POTASSIUM mmol/L 4 1  --  3 6 3 7 3 4* 4 2   < >  --    < >  --    < > 4 0   < > 4 4   < > 4 3   < >  --    MAGNESIUM mg/dL  --   --   --   --   --   --   --  1 6  --  1 8  --   --   --  2 0  --  2 0  --   --    PHOSPHORUS mg/dL  --   --   --   --   --   --   --  2 9  --  3 0  --   --   --  3 6  --  3 5  --   --    PLATELETS Thousands/uL  --  200  --   --   --   --   --  175  --  186  --  178  --   --   --  155   < >  --    WBC Thousand/uL  --  9 72  --   --   --   --   --  8 15  --  5 19  --  3 07*  --   --   --  3 96*  --   --     < > = values in this interval not displayed  Portions of the record may have been created with voice recognition software  Occasional wrong word or \"sound a like\" substitutions may have occurred due to the inherent limitations of voice recognition software  Read the chart carefully and recognize, using context, where substitutions have occurred  If you have any questions, please contact the dictating provider      "

## 2023-06-09 LAB
ANION GAP SERPL CALCULATED.3IONS-SCNC: 0 MMOL/L (ref 4–13)
ARTERIAL PATENCY WRIST A: YES
BASE EX.OXY STD BLDV CALC-SCNC: 78.5 % (ref 60–80)
BASE EXCESS BLDV CALC-SCNC: 9 MMOL/L
BASOPHILS # BLD AUTO: 0.01 THOUSANDS/ÂΜL (ref 0–0.1)
BASOPHILS NFR BLD AUTO: 0 % (ref 0–1)
BUN SERPL-MCNC: 27 MG/DL (ref 5–25)
CALCIUM SERPL-MCNC: 8.4 MG/DL (ref 8.3–10.1)
CALCIUM SERPL-MCNC: 8.4 MG/DL (ref 8.3–10.1)
CALCIUM SERPL-MCNC: 8.8 MG/DL (ref 8.3–10.1)
CHLORIDE SERPL-SCNC: 84 MMOL/L (ref 96–108)
CHLORIDE SERPL-SCNC: 86 MMOL/L (ref 96–108)
CHLORIDE SERPL-SCNC: 87 MMOL/L (ref 96–108)
CO2 SERPL-SCNC: 43 MMOL/L (ref 21–32)
CO2 SERPL-SCNC: >45 MMOL/L (ref 21–32)
CO2 SERPL-SCNC: >45 MMOL/L (ref 21–32)
CREAT SERPL-MCNC: 0.74 MG/DL (ref 0.6–1.3)
CREAT SERPL-MCNC: 0.75 MG/DL (ref 0.6–1.3)
CREAT SERPL-MCNC: 0.89 MG/DL (ref 0.6–1.3)
EOSINOPHIL # BLD AUTO: 0.03 THOUSAND/ÂΜL (ref 0–0.61)
EOSINOPHIL NFR BLD AUTO: 0 % (ref 0–6)
ERYTHROCYTE [DISTWIDTH] IN BLOOD BY AUTOMATED COUNT: 12.3 % (ref 11.6–15.1)
GFR SERPL CREATININE-BSD FRML MDRD: 89 ML/MIN/1.73SQ M
GFR SERPL CREATININE-BSD FRML MDRD: 95 ML/MIN/1.73SQ M
GFR SERPL CREATININE-BSD FRML MDRD: 96 ML/MIN/1.73SQ M
GLUCOSE SERPL-MCNC: 277 MG/DL (ref 65–140)
GLUCOSE SERPL-MCNC: 82 MG/DL (ref 65–140)
GLUCOSE SERPL-MCNC: 85 MG/DL (ref 65–140)
HCO3 BLDV-SCNC: 36.1 MMOL/L (ref 24–30)
HCT VFR BLD AUTO: 31.6 % (ref 36.5–49.3)
HGB BLD-MCNC: 9.7 G/DL (ref 12–17)
IMM GRANULOCYTES # BLD AUTO: 0.05 THOUSAND/UL (ref 0–0.2)
IMM GRANULOCYTES NFR BLD AUTO: 0 % (ref 0–2)
LYMPHOCYTES # BLD AUTO: 0.9 THOUSANDS/ÂΜL (ref 0.6–4.47)
LYMPHOCYTES NFR BLD AUTO: 8 % (ref 14–44)
MCH RBC QN AUTO: 30.6 PG (ref 26.8–34.3)
MCHC RBC AUTO-ENTMCNC: 30.7 G/DL (ref 31.4–37.4)
MCV RBC AUTO: 100 FL (ref 82–98)
MONOCYTES # BLD AUTO: 1.16 THOUSAND/ÂΜL (ref 0.17–1.22)
MONOCYTES NFR BLD AUTO: 10 % (ref 4–12)
NASAL CANNULA: 3
NEUTROPHILS # BLD AUTO: 9.26 THOUSANDS/ÂΜL (ref 1.85–7.62)
NEUTS SEG NFR BLD AUTO: 82 % (ref 43–75)
NON VENT- BIPAP: ABNORMAL
NRBC BLD AUTO-RTO: 0 /100 WBCS
O2 CT BLDV-SCNC: 12.8 ML/DL
PCO2 BLDV: 63.2 MM HG (ref 42–50)
PH BLDV: 7.38 [PH] (ref 7.3–7.4)
PLATELET # BLD AUTO: 210 THOUSANDS/UL (ref 149–390)
PMV BLD AUTO: 9.7 FL (ref 8.9–12.7)
PO2 BLDV: 46.2 MM HG (ref 35–45)
POTASSIUM SERPL-SCNC: 3.6 MMOL/L (ref 3.5–5.3)
POTASSIUM SERPL-SCNC: 3.7 MMOL/L (ref 3.5–5.3)
POTASSIUM SERPL-SCNC: 3.7 MMOL/L (ref 3.5–5.3)
RBC # BLD AUTO: 3.17 MILLION/UL (ref 3.88–5.62)
SODIUM SERPL-SCNC: 127 MMOL/L (ref 135–147)
SODIUM SERPL-SCNC: 131 MMOL/L (ref 135–147)
SODIUM SERPL-SCNC: 132 MMOL/L (ref 135–147)
WBC # BLD AUTO: 11.41 THOUSAND/UL (ref 4.31–10.16)

## 2023-06-09 PROCEDURE — 94760 N-INVAS EAR/PLS OXIMETRY 1: CPT

## 2023-06-09 PROCEDURE — 80048 BASIC METABOLIC PNL TOTAL CA: CPT | Performed by: INTERNAL MEDICINE

## 2023-06-09 PROCEDURE — 94660 CPAP INITIATION&MGMT: CPT

## 2023-06-09 PROCEDURE — 99232 SBSQ HOSP IP/OBS MODERATE 35: CPT | Performed by: INTERNAL MEDICINE

## 2023-06-09 PROCEDURE — 97530 THERAPEUTIC ACTIVITIES: CPT

## 2023-06-09 PROCEDURE — 94640 AIRWAY INHALATION TREATMENT: CPT

## 2023-06-09 PROCEDURE — 80048 BASIC METABOLIC PNL TOTAL CA: CPT

## 2023-06-09 PROCEDURE — 82805 BLOOD GASES W/O2 SATURATION: CPT

## 2023-06-09 PROCEDURE — 85025 COMPLETE CBC W/AUTO DIFF WBC: CPT

## 2023-06-09 PROCEDURE — 94664 DEMO&/EVAL PT USE INHALER: CPT

## 2023-06-09 PROCEDURE — 99232 SBSQ HOSP IP/OBS MODERATE 35: CPT | Performed by: HOSPITALIST

## 2023-06-09 RX ORDER — POTASSIUM CHLORIDE 14.9 MG/ML
20 INJECTION INTRAVENOUS ONCE
Status: COMPLETED | OUTPATIENT
Start: 2023-06-09 | End: 2023-06-09

## 2023-06-09 RX ADMIN — DOXYCYCLINE 100 MG: 100 CAPSULE ORAL at 09:46

## 2023-06-09 RX ADMIN — BUDESONIDE 0.5 MG: 0.5 INHALANT ORAL at 07:45

## 2023-06-09 RX ADMIN — IPRATROPIUM BROMIDE 0.5 MG: 0.5 SOLUTION RESPIRATORY (INHALATION) at 07:45

## 2023-06-09 RX ADMIN — LEVALBUTEROL HYDROCHLORIDE 1.25 MG: 1.25 SOLUTION RESPIRATORY (INHALATION) at 19:43

## 2023-06-09 RX ADMIN — IPRATROPIUM BROMIDE 0.5 MG: 0.5 SOLUTION RESPIRATORY (INHALATION) at 13:21

## 2023-06-09 RX ADMIN — DOCUSATE SODIUM 100 MG: 100 CAPSULE, LIQUID FILLED ORAL at 09:46

## 2023-06-09 RX ADMIN — GUAIFENESIN 1200 MG: 600 TABLET, EXTENDED RELEASE ORAL at 21:01

## 2023-06-09 RX ADMIN — POTASSIUM CHLORIDE 20 MEQ: 14.9 INJECTION, SOLUTION INTRAVENOUS at 02:58

## 2023-06-09 RX ADMIN — PREDNISONE 40 MG: 20 TABLET ORAL at 09:46

## 2023-06-09 RX ADMIN — GUAIFENESIN 1200 MG: 600 TABLET, EXTENDED RELEASE ORAL at 09:46

## 2023-06-09 RX ADMIN — CHLORHEXIDINE GLUCONATE 15 ML: 1.2 SOLUTION ORAL at 09:46

## 2023-06-09 RX ADMIN — FORMOTEROL FUMARATE DIHYDRATE 20 MCG: 20 SOLUTION RESPIRATORY (INHALATION) at 19:43

## 2023-06-09 RX ADMIN — Medication 7.5 MG: at 13:12

## 2023-06-09 RX ADMIN — MELATONIN 3 MG: at 21:01

## 2023-06-09 RX ADMIN — CYANOCOBALAMIN TAB 500 MCG 1000 MCG: 500 TAB at 09:46

## 2023-06-09 RX ADMIN — TORSEMIDE 20 MG: 20 TABLET ORAL at 09:47

## 2023-06-09 RX ADMIN — TAMSULOSIN HYDROCHLORIDE 0.4 MG: 0.4 CAPSULE ORAL at 18:06

## 2023-06-09 RX ADMIN — DOXYCYCLINE 100 MG: 100 CAPSULE ORAL at 21:01

## 2023-06-09 RX ADMIN — ATORVASTATIN CALCIUM 40 MG: 40 TABLET, FILM COATED ORAL at 18:07

## 2023-06-09 RX ADMIN — DOCUSATE SODIUM 100 MG: 100 CAPSULE, LIQUID FILLED ORAL at 18:06

## 2023-06-09 RX ADMIN — ASPIRIN 81 MG: 81 TABLET, COATED ORAL at 09:46

## 2023-06-09 RX ADMIN — ENOXAPARIN SODIUM 40 MG: 40 INJECTION SUBCUTANEOUS at 09:46

## 2023-06-09 RX ADMIN — LEVALBUTEROL HYDROCHLORIDE 1.25 MG: 1.25 SOLUTION RESPIRATORY (INHALATION) at 07:45

## 2023-06-09 RX ADMIN — FORMOTEROL FUMARATE DIHYDRATE 20 MCG: 20 SOLUTION RESPIRATORY (INHALATION) at 07:49

## 2023-06-09 RX ADMIN — IPRATROPIUM BROMIDE 0.5 MG: 0.5 SOLUTION RESPIRATORY (INHALATION) at 19:43

## 2023-06-09 RX ADMIN — CHLORHEXIDINE GLUCONATE 15 ML: 1.2 SOLUTION ORAL at 21:01

## 2023-06-09 RX ADMIN — LEVALBUTEROL HYDROCHLORIDE 1.25 MG: 1.25 SOLUTION RESPIRATORY (INHALATION) at 13:21

## 2023-06-09 NOTE — MALNUTRITION/BMI
This medical record reflects one or more clinical indicators suggestive of malnutrition  Malnutrition Findings:   Adult Malnutrition type: Chronic illness  Adult Degree of Malnutrition: Other severe protein calorie malnutrition  Malnutrition Characteristics: Muscle loss, Fat loss                  360 Statement: severe malnutrition r/t chronic medical conditions as evidenced by Moderate-severe muscle loss around clavicles and temples, mild fat loss buccal fat pad, moderate fat loss orbital fat pad  Treatment: oral diet and oral nutrition supplements  BMI Findings: Body mass index is 19 38 kg/m²  See Nutrition note dated 6/9/23 for additional details  Completed nutrition assessment is viewable in the nutrition documentation  Ordered low volume supplement Ensure Compact TID with all meals

## 2023-06-09 NOTE — PROGRESS NOTES
NEPHROLOGY PROGRESS NOTE   Yvette Gaspar  79 y o  male MRN: [id number]  [facility] En[facility]: [id number]        ASSESSMENT & PLAN    1   Acute on chronic hyponatremia  • This is in the setting of low output congestive heart failure and prostate cancer which likely has high ADH release his sodium level is slowly rising maintained on oral torsemide and fluid restriction  • Sodium levels have improved now to 131 we will give him an additional dose of tolvaptan today and repeat a BMP tomorrow  2   Acute on chronic hypoxic respiratory failure  • He is currently being treated for COPD exacerbation, he is currently also being diuresed  • Unfortunately his breathing remains poor  • He states this is may be slightly better  • Palliative care is seeing him  • He is now on 3 L nasal cannula his work of breathing is better and he is resting  3   Congestive heart failure with a reduced ejection fraction  • Continue diuresis,monitor ins and outs and daily weights  • Making more urine, but bed scale weight did increase  • Chest x-ray shows mild right basilar airspace opacity and background emphysematous changes  • Give an additional dose of tolvaptan today  4   Frailty and cachexia  • Continue nutritional optimization  • Okay to take Ensure 3 times daily    DISCUSSION:    Monitor serum sodium  As long as his sodium stays above 130 stable for discharge from a nephrology standpoint  Once overall goals of care are addressed    SUBJECTIVE:    The patient was seen today  Overall stable no chest pain  States his breathing is better    12 point review of systems was otherwise negative besides what is mentioned above      Medications:    Current Facility-Administered Medications:   •  acetaminophen (TYLENOL) tablet 975 mg, 975 mg, Oral, Q8H PRN, Ang Kassy Cera, DO, 975 mg at 06/05/23 3441  •  aspirin (ECOTRIN LOW STRENGTH) EC tablet 81 mg, 81 mg, Oral, Daily, Ang Kassy Cera, DO, 81 mg at 06/09/23 0946  •  atorvastatin (LIPITOR) tablet 40 mg, 40 mg, Oral, Daily With Dinner, Ang Prem Rich, DO, 40 mg at 06/08/23 1726  •  budesonide (PULMICORT) inhalation solution 0 5 mg, 0 5 mg, Nebulization, Daily, Ang Prem Rich, DO, 0 5 mg at 06/09/23 0745  •  chlorhexidine (PERIDEX) 0 12 % oral rinse 15 mL, 15 mL, Mouth/Throat, Q12H [address], Ang Prem Rich, DO, 15 mL at 06/09/23 0946  •  cyanocobalamin (VITAMIN B-12) tablet 1,000 mcg, 1,000 mcg, Oral, Daily, Clarice Cole MD, 1,000 mcg at 06/09/23 0946  •  docusate sodium (COLACE) capsule 100 mg, 100 mg, Oral, BID, Ang Prem Rich, DO, 100 mg at 06/09/23 0946  •  doxycycline hyclate (VIBRAMYCIN) capsule 100 mg, 100 mg, Oral, Q12H [address], Ang Prem Rich, DO, 100 mg at 06/09/23 0946  •  enoxaparin (LOVENOX) subcutaneous injection 40 mg, 40 mg, Subcutaneous, Daily, Ang Prem Rich, DO, 40 mg at 06/09/23 0946  •  formoterol (PERFOROMIST) nebulizer solution 20 mcg, 20 mcg, Nebulization, Q12H, Ang Prem Rich, DO, 20 mcg at 06/09/23 0749  •  guaiFENesin (MUCINEX) 12 hr tablet 1,200 mg, 1,200 mg, Oral, Q12H [address], Ang Prem Rich, DO, 1,200 mg at 06/09/23 5356  •  ipratropium (ATROVENT) 0 02 % inhalation solution 0 5 mg, 0 5 mg, Nebulization, TID, Ang Prem Rich, DO, 0 5 mg at 06/09/23 0745  •  levalbuterol (Carrolyn Xuan) inhalation solution 1 25 mg, 1 25 mg, Nebulization, TID, Ang Prem Rich, DO, 1 25 mg at 06/09/23 0745  •  melatonin tablet 3 mg, 3 mg, Oral, HS, Juan Carlos Rich DO, 3 mg at 06/08/23 2151  •  predniSONE tablet 40 mg, 40 mg, Oral, Daily, 40 mg at 06/09/23 0946 **FOLLOWED BY** [START ON 6/12/2023] predniSONE tablet 30 mg, 30 mg, Oral, Daily **FOLLOWED BY** [START ON 6/15/2023] predniSONE tablet 20 mg, 20 mg, Oral, Daily **FOLLOWED BY** [START ON 6/18/2023] predniSONE tablet 10 mg, 10 mg, Oral, Daily, Mynor Vásquez DO  •  sodium chloride (OCEAN) 0 65 % nasal spray 1 spray, 1 spray, Each Nare, Q1H PRN, Juan Carlos Rich DO, 1 spray at 06/04/23 0654  •  tamsulosin ([facility]) capsule 0 4 mg, 0 4 mg, Oral, Daily With Dinner, Juan Carlos Gore [employer], DO, 0 4 mg at 06/08/23 1726  •  tolvaptan (Samsca) split tablet 7 5 mg, 7 5 mg, Oral, Once, Elwood French [place]ick, DO  •  torsemide [facility]) tablet 20 mg, 20 mg, Oral, Daily, Juan Carlos [employer], DO, 20 mg at 06/09/23 0947    OBJECTIVE:    Vitals:    06/09/23 0350 06/09/23 0600 06/09/23 0715 06/09/23 0747   BP: 107/74  115/62    BP Location: Right arm      Pulse: 78  84    Resp: (!) 23      Temp:   97 9 °F (36 6 °C)    TempSrc:   Oral    SpO2: 100%  94% 93%   Weight:  45 kg (99 lb 3 3 oz)     Height:            Temp:  [97 5 °F (36 4 °C)-98 7 °F (37 1 °C)] 97 9 °F (36 6 °C)  HR:  [78-90] 84  Resp:  [23-24] 23  BP: (101-115)/(58-74) 115/62  SpO2:  [93 %-100 %] 93 %     Body mass index is 19 38 kg/m²  Weight (last 2 days)     Date/Time Weight    06/09/23 0600 45 (99 21)    06/08/23 0600 48 1 (106 02)          I/O last 3 completed shifts: In: 318 [P O :318]  Out: 7585 [Urine:3080]    No intake/output data recorded        Physical exam:    General: Frail and cachectic  Eyes: conjunctivae pink, anicteric sclerae  ENT: lips and mucous membranes moist, no exudates, normal external ears  Neck: ROM intact, no JVD  Chest: No respiratory distress, increased accessory muscle use  CVS: normal rate, non pericardial friction rub  Abdomen: soft, non-tender, non-distended, normoactive bowel sounds  Extremities: no edema of both legs  Skin: no rash  Neuro: awake, alert, oriented, grossly intact  Psych:  Pleasant affect    Invasive Devices:      Lab Results:   Results from last 7 days   Lab Units 06/09/23  0644 06/09/23  0627 06/08/23 2018 06/08/23  0852 06/08/23  0530 06/07/23 2048 06/05/23  0820 06/05/23  0539 06/04/23  0929 06/04/23  0454 06/03/23  1111 06/03/23  0534 06/03/23  0057 06/02/23  1340   BUN mg/dL 27* 27* 30* 29*  --  32*   < >  --    < >  --    < > 25   < > 23   CALCIUM mg/dL 8 8 8 4 8 3 8 8  --  8 6   < >  --    < >  --    < > 9 3   < > 9 1   CHLORIDE mmol/L "86* 87* 82* 88*  --  85*   < >  --    < >  --    < > 83*   < > 85*   CO2 mmol/L >45* >45* >45* >45*  --  >45*   < >  --    < >  --    < > 43*   < > 41*   CREATININE mg/dL 0 75 0 74 0 83 0 82  --  0 87   < >  --    < >  --    < > 0 61   < > 0 65   HEMATOCRIT %  --  31 6*  --   --  33 0*  --   --  32 5*  --  32 8*  --  33 9*  --   --    HEMOGLOBIN g/dL  --  9 7*  --   --  9 9*  --   --  10 8*  --  11 0*  --  11 3*  --   --    POTASSIUM mmol/L 3 7 3 6 3 1* 4 1  --  3 6   < >  --    < >  --    < > 4 0   < > 4 4   MAGNESIUM mg/dL  --   --   --   --   --   --   --  1 6  --  1 8  --   --   --  2 0   PHOSPHORUS mg/dL  --   --   --   --   --   --   --  2 9  --  3 0  --   --   --  3 6   PLATELETS Thousands/uL  --  210  --   --  200  --   --  175  --  186  --  178  --   --    WBC Thousand/uL  --  11 41*  --   --  9 72  --   --  8 15  --  5 19  --  3 07*  --   --     < > = values in this interval not displayed  Portions of the record may have been created with voice recognition software  Occasional wrong word or \"sound a like\" substitutions may have occurred due to the inherent limitations of voice recognition software  Read the chart carefully and recognize, using context, where substitutions have occurred  If you have any questions, please contact the dictating provider        "

## 2023-06-09 NOTE — ASSESSMENT & PLAN NOTE
Wt Readings from Last 3 Encounters:   06/09/23 45 kg (99 lb 3 3 oz)   06/01/23 46 7 kg (103 lb)   05/19/23 46 8 kg (103 lb 2 8 oz)     · Echo on 5/1/2023 - LVEF 20%, severe global hypokinesis, abnormal mildly septal motion consistent with left bundle branch block and diastolic flattening of interventricular septum consistent with right ventricule volume overload  ·   · S/p 2 doses of IV Lasix 40 mg  · Continue home torsemide 20 mg daily  · Daily weights and strict ins and outs

## 2023-06-09 NOTE — ASSESSMENT & PLAN NOTE
· Currently with acute exacerbation  · Pt reports he smoked 2-3ppd for approx 45 years  · History of severe COPD with multiple admissions for COPD exacerbation  · PFT on 9/16/2020 showed FEV1/FVC 30%, FVC 59% predicted and FEV1 22% predicted  · Presented with respiratory distress requiring BiPAP  · was on high flow, now weaned to 3L NC O2 (baseline) during daytime and BiPAP at night  · Continue Pulmicort, formoterol, Atrovent, Xopenex  · Was on Solu-Medrol 40 mg every 12 hours, now transitioned to Prednisone taper  · Pulmonology following  · Patient has end-stage COPD and has been seen by palliative care  He continues to want aggressive measures and to remain full code

## 2023-06-09 NOTE — PLAN OF CARE
Problem: PHYSICAL THERAPY ADULT  Goal: Performs mobility at highest level of function for planned discharge setting  See evaluation for individualized goals  Description: Treatment/Interventions: Functional transfer training, LE strengthening/ROM, Therapeutic exercise, Endurance training, Elevations, Patient/family training, Equipment eval/education, Bed mobility, Gait training, OT, Spoke to nursing  Equipment Recommended:  (will cont to assess)       See flowsheet documentation for full assessment, interventions and recommendations  Note: Prognosis: Fair  Problem List: Decreased strength, Decreased range of motion, Decreased endurance, Impaired balance, Decreased mobility, Decreased coordination, Decreased cognition, Decreased safety awareness, Pain  Assessment: Pt demonstrated good motivation to complete therapy despite feeling fatigued today  Pt required increased time and seated rest break post bed mobility to recover SpO2  Required increased A for transfers with balance and strength deficits  Ambulated with mildly unsteady gait  Additional ambulation limited by Spo2  Pt will benefit from continued inpt skilled PT and rehab to maximize functional mobility & safety  PT Discharge Recommendation: Post acute rehabilitation services    See flowsheet documentation for full assessment

## 2023-06-09 NOTE — PLAN OF CARE
Problem: MOBILITY - ADULT  Goal: Maintain or return to baseline ADL function  Description: INTERVENTIONS:  -  Assess patient's ability to carry out ADLs; assess patient's baseline for ADL function and identify physical deficits which impact ability to perform ADLs (bathing, care of mouth/teeth, toileting, grooming, dressing, etc )  - Assess/evaluate cause of self-care deficits   - Assess range of motion  - Assess patient's mobility; develop plan if impaired  - Assess patient's need for assistive devices and provide as appropriate  - Encourage maximum independence but intervene and supervise when necessary  - Involve family in performance of ADLs  - Assess for home care needs following discharge   - Consider OT consult to assist with ADL evaluation and planning for discharge  - Provide patient education as appropriate  Outcome: Progressing  Goal: Maintains/Returns to pre admission functional level  Description: INTERVENTIONS:  - Perform BMAT or MOVE assessment daily    - Set and communicate daily mobility goal to care team and patient/family/caregiver  - Collaborate with rehabilitation services on mobility goals if consulted  - Perform Range of Motion  times a day  - Reposition patient every  hours    - Dangle patient  times a day  - Stand patient  times a day  - Ambulate patient  times a day  - Out of bed to chair  times a day   - Out of bed for samuel times a day  - Out of bed for toileting  - Record patient progress and toleration of activity level   Outcome: Progressing

## 2023-06-09 NOTE — PROGRESS NOTES
[facility]  Progress Note  Name: Maribel Gilliland  MRN: [id number]  Unit/Bed#: [facility] 205-01 I Date of Admission: 6/1/2023   Date of Service: 6/9/2023 I Hospital Day: 8    Assessment/Plan   Pneumonia  Assessment & Plan  -CXR (reviewed by me) 6/1/23: Mild right basilar airspace opacity concerning for pneumonia  Background emphysematous changes  -pt was on Vancomycin/cefepime, now on Doxy (MRSA POS nares)  -BCxs NGTD  -pulm following  -Speech therapy consulted, no change in diet recommended    Acute exacerbation of congestive heart failure (HCC)  Assessment & Plan  Wt Readings from Last 3 Encounters:   06/09/23 45 kg (99 lb 3 3 oz)   06/01/23 46 7 kg (103 lb)   05/19/23 46 8 kg (103 lb 2 8 oz)     · Echo on 5/1/2023 - LVEF 20%, severe global hypokinesis, abnormal mildly septal motion consistent with left bundle branch block and diastolic flattening of interventricular septum consistent with right ventricule volume overload  ·   · S/p 2 doses of IV Lasix 40 mg  · Continue home torsemide 20 mg daily  · Daily weights and strict ins and outs  * Chronic obstructive pulmonary disease (HCC)  Assessment & Plan  · Currently with acute exacerbation  · Pt reports he smoked 2-3ppd for approx 45 years  · History of severe COPD with multiple admissions for COPD exacerbation  · PFT on 9/16/2020 showed FEV1/FVC 30%, FVC 59% predicted and FEV1 22% predicted  · Presented with respiratory distress requiring BiPAP  · was on high flow, now weaned to 3L NC O2 (baseline) during daytime and BiPAP at night  · Continue Pulmicort, formoterol, Atrovent, Xopenex  · Was on Solu-Medrol 40 mg every 12 hours, now transitioned to Prednisone taper  · Pulmonology following  · Patient has end-stage COPD and has been seen by palliative care  He continues to want aggressive measures and to remain full code      Hyponatremia  Assessment & Plan  -chronic hyponatremia due to chronic HFrEF and 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 CA  -Currently Na 131  -Appreciate nephrology  -Continue increased solute intake, fluid restriction, diuretics  -s/p 7 5mg Samsca on 23 and 23  Another dose order for today  Osteoporosis  Assessment & Plan  · DEXA scan on 2019 showed osteoporosis  · Not on any medications  Previously on bisphosphonate  · Follow-up outpatient with PCP  KEVIN and COPD overlap syndrome (HCC)  Assessment & Plan  · Continue BiPAP at night    Anemia  Assessment & Plan  · History of chronic anemia  · No active sign of bleeding  · Trend CBC  · cont home B12   · Transfuse if hemoglobin less than 7  VTE Pharmacologic Prophylaxis:   Lovenox    Patient Centered Rounds: I performed bedside rounds with nursing staff today  Education and Discussions with Family / Patient: family updated at bedside  Total Time Spent on Date of Encounter in care of patient: 35 minutes This time was spent on one or more of the following: performing physical exam; counseling and coordination of care; obtaining or reviewing history; documenting in the medical record; reviewing/ordering tests, medications or procedures; communicating with other healthcare professionals and discussing with patient's family/caregivers  Current Length of Stay: 8 day(s)  Current Patient Status: Inpatient   Certification Statement: The patient will continue to require additional inpatient hospital stay due to hyponatremia, end stage COPD  Discharge Plan: Anticipate discharge in 24-48 hrs to discharge location to be determined pending rehab evaluations  Code Status: Level 1 - Full Code    Subjective:   Pt denies SOB  Objective:     Vitals:   Temp (24hrs), Av °F (36 7 °C), Min:97 5 °F (36 4 °C), Max:98 7 °F (37 1 °C)    Temp:  [97 5 °F (36 4 °C)-98 7 °F (37 1 °C)] 98 1 °F (36 7 °C)  HR:  [78-90] 84  Resp:  [23-24] 23  BP: (101-115)/(58-74) 101/59  SpO2:  [92 %-100 %] 92 %  Body mass index is 19 38 kg/m²       Input and Output Summary (last 24 hours): Intake/Output Summary (Last 24 hours) at 6/9/2023 1300  Last data filed at 6/9/2023 1048  Gross per 24 hour   Intake 318 ml   Output 1400 ml   Net -1082 ml       Physical Exam:   Gen: NAD, minimally using accessory muscles to breathe, AAOx3, appears chronically ill, cachectic, malnourished  Eyes: EOMI, PERRLA, no scleral icterus  ENMT:  no nasal discharge, no otic discharge, moist mucous membranes  Neck:  Supple  Cardiovascular: remains Regular rate and rhythm, normal S1-S2, no murmurs, rubs, or gallops  Lungs:  CTAB with distant BS  Abdomen: Positive bowel sounds, soft, nontender, nondistended   Skin:  Intact, no obvious lesions or rashes  Neuro: Cranial nerves 2-12 are intact, non-focal    Additional Data:     Labs:  Results from last 7 days   Lab Units 06/09/23  0627   EOS PCT % 0   HEMATOCRIT % 31 6*   HEMOGLOBIN g/dL 9 7*   LYMPHS PCT % 8*   MONOS PCT % 10   NEUTROS PCT % 82*   PLATELETS Thousands/uL 210   WBC Thousand/uL 11 41*     Results from last 7 days   Lab Units 06/09/23  0644 06/07/23  1018 06/06/23  2100   ANION GAP mmol/L  --   --  1*   BUN mg/dL 27*   < > 27*   CALCIUM mg/dL 8 8   < > 8 7   CHLORIDE mmol/L 86*   < > 85*   CO2 mmol/L >45*   < > 43*   CREATININE mg/dL 0 75   < > 0 85   GLUCOSE RANDOM mg/dL 85   < > 116   POTASSIUM mmol/L 3 7   < > 3 4*   SODIUM mmol/L 131*   < > 129*    < > = values in this interval not displayed                   Results from last 7 days   Lab Units 06/03/23  0534   PROCALCITONIN ng/ml 0 22       Lines/Drains:  Invasive Devices     Peripheral Intravenous Line  Duration           Peripheral IV 06/09/23 Proximal;Right;Ventral (anterior) Forearm <1 day          Drain  Duration           External Urinary Catheter Small 7 days                Recent Cultures (last 7 days):         Last 24 Hours Medication List:   Current Facility-Administered Medications   Medication Dose Route Frequency Provider Last Rate   • acetaminophen  975 mg Oral Q8H PRN Juan Carlos Jc, DO     • aspirin  81 mg Oral Daily Juan Carlos Jc, DO     • atorvastatin  40 mg Oral Daily With Dinner Juan Carlos Jc, DO     • budesonide  0 5 mg Nebulization Daily Juan Carlos Jc, DO     • chlorhexidine  15 mL Mouth/Throat Q12H [address] [facility] Matt Tuckers, DO     • cyanocobalamin  1,000 mcg Oral Daily Albert Schmitt MD     • docusate sodium  100 mg Oral BID Juan Carlos Jc, DO     • doxycycline hyclate  100 mg Oral Q12H [address] [facility], [facility]     • enoxaparin  40 mg Subcutaneous Daily Juan Carlos Jc, DO     • formoterol  20 mcg Nebulization Q12H Juan Carlos Jc, DO     • guaiFENesin  1,200 mg Oral Q12H [address] [facility], [facility]     • ipratropium  0 5 mg Nebulization TID Juan Carlos Jc, DO     • levalbuterol  1 25 mg Nebulization TID Juan Carlos Jc, DO     • melatonin  3 mg Oral HS Juan Carlos Jc, DO     • predniSONE  40 mg Oral Daily Cassandra Lacey DO      Followed by   • [START ON 6/12/2023] predniSONE  30 mg Oral Daily Cassandra Lacey DO      Followed by   • [START ON 6/15/2023] predniSONE  20 mg Oral Daily Cassandra Lacey DO      Followed by   • [START ON 6/18/2023] predniSONE  10 mg Oral Daily Cassandra Lacey, DO     • sodium chloride  1 spray Each Nare Q1H PRN Juan Carlos Jc, DO     • tamsulosin  0 4 mg Oral Daily With Dinner Juan Carlos Jc, DO     • tolvaptan  7 5 mg Oral Once Chelsea Jones, DO     • torsemide  20 mg Oral Daily Juan Carlos Jc, DO          Today, Patient Was Seen By: Albert Schmitt MD    **Please Note: This note may have been constructed using a voice recognition system  **

## 2023-06-09 NOTE — PLAN OF CARE
Problem: MOBILITY - ADULT  Goal: Maintain or return to baseline ADL function  Description: INTERVENTIONS:  -  Assess patient's ability to carry out ADLs; assess patient's baseline for ADL function and identify physical deficits which impact ability to perform ADLs (bathing, care of mouth/teeth, toileting, grooming, dressing, etc )  - Assess/evaluate cause of self-care deficits   - Assess range of motion  - Assess patient's mobility; develop plan if impaired  - Assess patient's need for assistive devices and provide as appropriate  - Encourage maximum independence but intervene and supervise when necessary  - Involve family in performance of ADLs  - Assess for home care needs following discharge   - Consider OT consult to assist with ADL evaluation and planning for discharge  - Provide patient education as appropriate  Outcome: Progressing     Problem: Prexisting or High Potential for Compromised Skin Integrity  Goal: Skin integrity is maintained or improved  Description: INTERVENTIONS:  - Identify patients at risk for skin breakdown  - Assess and monitor skin integrity  - Assess and monitor nutrition and hydration status  - Monitor labs   - Assess for incontinence   - Turn and reposition patient  - Assist with mobility/ambulation  - Relieve pressure over bony prominences  - Avoid friction and shearing  - Provide appropriate hygiene as needed including keeping skin clean and dry  - Evaluate need for skin moisturizer/barrier cream  - Collaborate with interdisciplinary team   - Patient/family teaching  - Consider wound care consult   Outcome: Progressing     Problem: RESPIRATORY - ADULT  Goal: Achieves optimal ventilation and oxygenation  Description: INTERVENTIONS:  - Assess for changes in respiratory status  - Assess for changes in mentation and behavior  - Position to facilitate oxygenation and minimize respiratory effort  - Oxygen administered by appropriate delivery if ordered  - Initiate smoking cessation education as indicated  - Encourage broncho-pulmonary hygiene including cough, deep breathe, Incentive Spirometry  - Assess the need for suctioning and aspirate as needed  - Assess and instruct to report SOB or any respiratory difficulty  - Respiratory Therapy support as indicated  Outcome: Progressing     Problem: SAFETY ADULT  Goal: Maintain or return to baseline ADL function  Description: INTERVENTIONS:  -  Assess patient's ability to carry out ADLs; assess patient's baseline for ADL function and identify physical deficits which impact ability to perform ADLs (bathing, care of mouth/teeth, toileting, grooming, dressing, etc )  - Assess/evaluate cause of self-care deficits   - Assess range of motion  - Assess patient's mobility; develop plan if impaired  - Assess patient's need for assistive devices and provide as appropriate  - Encourage maximum independence but intervene and supervise when necessary  - Involve family in performance of ADLs  - Assess for home care needs following discharge   - Consider OT consult to assist with ADL evaluation and planning for discharge  - Provide patient education as appropriate  Outcome: Progressing

## 2023-06-09 NOTE — PHYSICAL THERAPY NOTE
"                                                                                  PHYSICAL THERAPY NOTE          Patient Name: Tammy Valenzuela  Today's Date: 6/9/2023 06/09/23 1523   PT Last Visit   PT Visit Date 06/09/23   Note Type   Note Type Treatment   Pain Assessment   Pain Assessment Tool 0-10   Pain Score No Pain   Pain Location/Orientation Orientation: Right   Patient's Stated Pain Goal No pain   Hospital Pain Intervention(s) Ambulation/increased activity   Restrictions/Precautions   Weight Bearing Precautions Per Order No   Other Precautions Pain; Fall Risk;Telemetry;Multiple lines; Bed Alarm; Chair Alarm; Impulsive;O2   General   Chart Reviewed Yes   Family/Caregiver Present No   Cognition   Orientation Level Oriented X4   Subjective   Subjective \"I am just tired today\"   Bed Mobility   Supine to Sit 5  Supervision   Additional items HOB elevated   Sit to Supine 5  Supervision   Additional items Increased time required   Transfers   Sit to Stand 4  Minimal assistance   Additional items Assist x 1; Increased time required   Stand to Sit 4  Minimal assistance   Additional items Assist x 1; Increased time required   Ambulation/Elevation   Gait pattern Excessively slow; Shuffling;Decreased foot clearance   Gait Assistance 4  Minimal assist   Additional items Assist x 1   Assistive Device   (HHA)   Distance 2'   Balance   Static Sitting Fair   Dynamic Sitting Fair -   Static Standing Poor +   Ambulatory Poor +   Endurance Deficit   Endurance Deficit Yes   Endurance Deficit Description Spo2, fatigue, weakness   Activity Tolerance   Activity Tolerance Patient limited by fatigue;Patient limited by pain   Nurse Made Aware yes, nsg gave clearance to work with pt   Assessment   Prognosis Fair   Problem List Decreased strength;Decreased range of motion;Decreased endurance; Impaired balance;Decreased mobility; Decreased coordination;Decreased cognition;Decreased safety awareness;Pain   Assessment Pt demonstrated good " motivation to complete therapy despite feeling fatigued today  Pt required increased time and seated rest break post bed mobility to recover SpO2  Required increased A for transfers with balance and strength deficits  Ambulated with mildly unsteady gait  Additional ambulation limited by Spo2  Pt will benefit from continued inpt skilled PT and rehab to maximize functional mobility & safety  Goals   Patient Goals To breathe better   LTG Expiration Date 06/19/23   Plan   Treatment/Interventions OT; Spoke to case management;Spoke to nursing;Gait training;Bed mobility; Patient/family training; Endurance training;LE strengthening/ROM; Functional transfer training   PT Frequency 2-3x/wk   Recommendation   PT Discharge Recommendation Post acute rehabilitation services   AM-PAC Basic Mobility Inpatient   Turning in Flat Bed Without Bedrails 4   Lying on Back to Sitting on Edge of Flat Bed Without Bedrails 4   Moving Bed to Chair 2   Standing Up From Chair Using Arms 2   Walk in Room 2   Climb 3-5 Stairs With Railing 1   Basic Mobility Inpatient Raw Score 15   Basic Mobility Standardized Score 36 97   Highest Level Of Mobility   JH-HLM Goal 4: Move to chair/commode   JH-HLM Achieved 4: Move to chair/commode     Waldemar Mascorro PT, DPT

## 2023-06-09 NOTE — ASSESSMENT & PLAN NOTE
-chronic hyponatremia due to chronic HFrEF and 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 CA  -Currently Na 131  -Appreciate nephrology  -Continue increased solute intake, fluid restriction, diuretics  -s/p 7 5mg Samsca on 6/6/23 and 6/7/23  Another dose order for today

## 2023-06-09 NOTE — PROGRESS NOTES
PULMONOLOGY PROGRESS NOTE     Name: Jasen Carrasco  Age & Sex: 79 y o  male   MRN: [id number]  Unit/Bed#: [facility] 205-01   Encounter: [id number]    PATIENT INFORMATION     Name: Jasen Carrasco  Age & Sex: 79 y o  male   MRN: [id number]  Hospital Stay Days: 8    ASSESSMENT/PLAN     Assessment:   1  Acute on chronic respiratory failure with hypoxia and hypercapnia  2  Acute on chronic heart failure with reduced ejection fraction  3  Acute exacerbation of COPD  4  Very severe COPD, end-stage  5  KEVIN/COPD overlap  6  Concern for pneumonia     Plan:  • Continue supplemental oxygen to maintain SPO2 greater than 88%  Able to wean patient back down to baseline of 3L NC O2 today  • Continue BiPAP at night due to likely KEVIN/COPD with severe risk for hypercapnia and respiratory failure  • Continue bronchodilators with Xopenex/Atrovent and Perforomist/Pulmicort     • Continue prednisone taper as ordered  • Continue diuresis as likely his heart failure in combination with end stage COPD is likely causing his acute presentation  • He was started on broad-spectrum antibiotics on 6/3 due to elevated drip score   Procalcitonin has been grossly unremarkable   MRSA nares culture positive   Currently is on doxycycline  Blood cultures are negative 5 days   Recommend discontinuation of vancomycin and cefepime   Can continue doxycycline for 5-day course for tracheobronchitis  • Multiple discussions previously with hospice/palliative care  Patient seems to be uncertain with his degree of acceptance of his current conditions and likelihood of meaningful recovery  Palliative care input is greatly appreciated  Palliative team has been consulted and currently ongoing goals of care discussion taking place  At this time, wife is still considering options and how to proceed  However, was able to wean patient from high flow to 3L NC O2, which is his baseline  SUBJECTIVE     Patient seen and examined, lying in bed and sleeping   No acute events overnight  Patient feels that his BiPAP is getting in the way of his sleep  Otherwise has no acute complaints  Denies any fevers, chills, chest pain, lightheadedness or dizziness, nausea or vomiting, abdominal pain  Was able to wean patient down to his baseline of 3 L nasal cannula and patient tolerated well with no desaturation  Patient wanted to go back to sleep after exam, was feeling tired due to little sleep last night from the bipap machine  OBJECTIVE     Vitals:    23 0715 23 0747 23 1230 23 1321   BP: 115/62  101/59    BP Location:       Pulse: 84  84    Resp:       Temp: 97 9 °F (36 6 °C)  98 1 °F (36 7 °C)    TempSrc: Oral  Axillary    SpO2: 94% 93% 92% 90%   Weight:       Height:          Temperature:   Temp (24hrs), Av °F (36 7 °C), Min:97 5 °F (36 4 °C), Max:98 7 °F (37 1 °C)    Temperature: 98 1 °F (36 7 °C)  Intake & Output:  I/O       / 0701  / 0700  0701   07/ 0701  06/10 0700    P  O  480 318 118    Total Intake(mL/kg) 480 (10) 318 (7 1) 118 (2 6)    Urine (mL/kg/hr) 2050 (1 8) 1400 (1 3)     Stool       Total Output  1400     Net -1570 -1082 +118           Unmeasured Urine Occurrence  1 x         Weights:   IBW (Ideal Body Weight): 50 kg    Body mass index is 19 38 kg/m²  Weight (last 2 days)     Date/Time Weight    23 0600 45 (99 21)    23 0600 48 1 (106 02)        Physical Exam  Vitals reviewed  Constitutional:       General: He is not in acute distress  Appearance: He is cachectic  He is ill-appearing  Comments: Sleeping on exam    Eyes:      General:         Right eye: No discharge  Left eye: No discharge  Conjunctiva/sclera: Conjunctivae normal    Cardiovascular:      Rate and Rhythm: Normal rate and regular rhythm  Pulses: Normal pulses  Heart sounds: Normal heart sounds  No murmur heard  No gallop  Pulmonary:      Effort: Tachypnea present   No respiratory distress  Breath sounds: Decreased air movement present  Comments: Decreased breath sounds bilaterally  On 3L NC O2, saturating appropriately  Abdominal:      General: Bowel sounds are normal  There is no distension  Palpations: Abdomen is soft  Musculoskeletal:      Right lower leg: No edema  Left lower leg: No edema  Skin:     General: Skin is warm and dry  Neurological:      Mental Status: He is oriented to person, place, and time  Psychiatric:         Behavior: Behavior is cooperative  LABORATORY DATA     Labs: I have personally reviewed pertinent reports  Results from last 7 days   Lab Units 06/09/23 0627 06/08/23  0530 06/05/23  0539 06/04/23  0454   EOS PCT % 0  --  0 0   HEMATOCRIT % 31 6* 33 0* 32 5* 32 8*   HEMOGLOBIN g/dL 9 7* 9 9* 10 8* 11 0*   MONOS PCT % 10  --  7 9   NEUTROS PCT % 82*  --  89* 85*   PLATELETS Thousands/uL 210 200 175 186   WBC Thousand/uL 11 41* 9 72 8 15 5 19      Results from last 7 days   Lab Units 06/09/23  0644 06/09/23 0627 06/08/23 2018   BUN mg/dL 27* 27* 30*   CALCIUM mg/dL 8 8 8 4 8 3   CHLORIDE mmol/L 86* 87* 82*   CO2 mmol/L >45* >45* >45*   CREATININE mg/dL 0 75 0 74 0 83   POTASSIUM mmol/L 3 7 3 6 3 1*     Results from last 7 days   Lab Units 06/05/23  0539 06/04/23  0454   MAGNESIUM mg/dL 1 6 1 8     Results from last 7 days   Lab Units 06/05/23  0539 06/04/23  0454   PHOSPHORUS mg/dL 2 9 3 0                      ABG:       Micro:         IMAGING & DIAGNOSTIC TESTING     Radiology Results: I have personally reviewed pertinent reports  XR foot 3+ vw right    Result Date: 6/6/2023  Impression: No acute osseous abnormality  Workstation performed: [id number]     XR chest 1 view portable    Result Date: 6/2/2023  Impression: 1  Mild right basilar airspace opacity concerning for pneumonia  2   Background emphysematous changes  Interpretation concordant with preliminary findings from the emergency department   Workstation performed: "[id number]     Other Diagnostic Testing: I have personally reviewed pertinent reports  ACTIVE MEDICATIONS     Current Facility-Administered Medications   Medication Dose Route Frequency   • acetaminophen (TYLENOL) tablet 975 mg  975 mg Oral Q8H PRN   • aspirin (ECOTRIN LOW STRENGTH) EC tablet 81 mg  81 mg Oral Daily   • atorvastatin (LIPITOR) tablet 40 mg  40 mg Oral Daily With Dinner   • budesonide (PULMICORT) inhalation solution 0 5 mg  0 5 mg Nebulization Daily   • chlorhexidine (PERIDEX) 0 12 % oral rinse 15 mL  15 mL Mouth/Throat Q12H GABE   • cyanocobalamin (VITAMIN B-12) tablet 1,000 mcg  1,000 mcg Oral Daily   • docusate sodium (COLACE) capsule 100 mg  100 mg Oral BID   • doxycycline hyclate (VIBRAMYCIN) capsule 100 mg  100 mg Oral Q12H [address]   • enoxaparin (LOVENOX) subcutaneous injection 40 mg  40 mg Subcutaneous Daily   • formoterol (PERFOROMIST) nebulizer solution 20 mcg  20 mcg Nebulization Q12H   • guaiFENesin (MUCINEX) 12 hr tablet 1,200 mg  1,200 mg Oral Q12H GABE   • ipratropium (ATROVENT) 0 02 % inhalation solution 0 5 mg  0 5 mg Nebulization TID   • levalbuterol (XOPENEX) inhalation solution 1 25 mg  1 25 mg Nebulization TID   • melatonin tablet 3 mg  3 mg Oral HS   • predniSONE tablet 40 mg  40 mg Oral Daily    Followed by   • [START ON 6/12/2023] predniSONE tablet 30 mg  30 mg Oral Daily    Followed by   • [START ON 6/15/2023] predniSONE tablet 20 mg  20 mg Oral Daily    Followed by   • [START ON 6/18/2023] predniSONE tablet 10 mg  10 mg Oral Daily   • sodium chloride (OCEAN) 0 65 % nasal spray 1 spray  1 spray Each Nare Q1H PRN   • tamsulosin (FLOMAX) capsule 0 4 mg  0 4 mg Oral Daily With Dinner   • torsemide (DEMADEX) tablet 20 mg  20 mg Oral Daily         Disclaimer: Portions of the record may have been created with voice recognition software  Occasional wrong word or \"sound a like\" substitutions may have occurred due to the inherent limitations of voice recognition software   Careful " consideration should be taken to recognize, using context, where substitutions have occurred      Jeff Segovia MD   PGY-1, [facility] Transitional [facility] Res[facility]

## 2023-06-10 ENCOUNTER — APPOINTMENT (INPATIENT)
Dept: RADIOLOGY | Facility: HOSPITAL | Age: 68
DRG: 193 | End: 2023-06-10
Payer: COMMERCIAL

## 2023-06-10 PROBLEM — I50.23 ACUTE ON CHRONIC SYSTOLIC CONGESTIVE HEART FAILURE (HCC): Status: ACTIVE | Noted: 2023-06-01

## 2023-06-10 PROBLEM — E43 SEVERE PROTEIN-CALORIE MALNUTRITION (HCC): Status: ACTIVE | Noted: 2023-06-10

## 2023-06-10 LAB
ANION GAP SERPL CALCULATED.3IONS-SCNC: 1 MMOL/L (ref 4–13)
ANION GAP SERPL CALCULATED.3IONS-SCNC: 4 MMOL/L (ref 4–13)
BUN SERPL-MCNC: 22 MG/DL (ref 5–25)
BUN SERPL-MCNC: 27 MG/DL (ref 5–25)
CALCIUM SERPL-MCNC: 8.6 MG/DL (ref 8.3–10.1)
CALCIUM SERPL-MCNC: 8.7 MG/DL (ref 8.3–10.1)
CHLORIDE SERPL-SCNC: 83 MMOL/L (ref 96–108)
CHLORIDE SERPL-SCNC: 87 MMOL/L (ref 96–108)
CO2 SERPL-SCNC: 43 MMOL/L (ref 21–32)
CO2 SERPL-SCNC: 45 MMOL/L (ref 21–32)
CREAT SERPL-MCNC: 0.75 MG/DL (ref 0.6–1.3)
CREAT SERPL-MCNC: 1.31 MG/DL (ref 0.6–1.3)
GFR SERPL CREATININE-BSD FRML MDRD: 56 ML/MIN/1.73SQ M
GFR SERPL CREATININE-BSD FRML MDRD: 95 ML/MIN/1.73SQ M
GLUCOSE SERPL-MCNC: 143 MG/DL (ref 65–140)
GLUCOSE SERPL-MCNC: 341 MG/DL (ref 65–140)
POTASSIUM SERPL-SCNC: 3.4 MMOL/L (ref 3.5–5.3)
POTASSIUM SERPL-SCNC: 4.2 MMOL/L (ref 3.5–5.3)
SODIUM SERPL-SCNC: 130 MMOL/L (ref 135–147)
SODIUM SERPL-SCNC: 133 MMOL/L (ref 135–147)

## 2023-06-10 PROCEDURE — 94664 DEMO&/EVAL PT USE INHALER: CPT

## 2023-06-10 PROCEDURE — 94760 N-INVAS EAR/PLS OXIMETRY 1: CPT

## 2023-06-10 PROCEDURE — 94640 AIRWAY INHALATION TREATMENT: CPT

## 2023-06-10 PROCEDURE — 80048 BASIC METABOLIC PNL TOTAL CA: CPT | Performed by: INTERNAL MEDICINE

## 2023-06-10 PROCEDURE — 80048 BASIC METABOLIC PNL TOTAL CA: CPT | Performed by: PHYSICIAN ASSISTANT

## 2023-06-10 PROCEDURE — 99232 SBSQ HOSP IP/OBS MODERATE 35: CPT | Performed by: INTERNAL MEDICINE

## 2023-06-10 PROCEDURE — 94003 VENT MGMT INPAT SUBQ DAY: CPT

## 2023-06-10 PROCEDURE — 71045 X-RAY EXAM CHEST 1 VIEW: CPT

## 2023-06-10 PROCEDURE — 99232 SBSQ HOSP IP/OBS MODERATE 35: CPT | Performed by: FAMILY MEDICINE

## 2023-06-10 RX ORDER — ALBUTEROL SULFATE 2.5 MG/3ML
2.5 SOLUTION RESPIRATORY (INHALATION) EVERY 4 HOURS PRN
Status: DISCONTINUED | OUTPATIENT
Start: 2023-06-10 | End: 2023-06-12 | Stop reason: HOSPADM

## 2023-06-10 RX ORDER — DOCUSATE SODIUM 100 MG/1
100 CAPSULE, LIQUID FILLED ORAL 2 TIMES DAILY
Status: DISCONTINUED | OUTPATIENT
Start: 2023-06-10 | End: 2023-06-12 | Stop reason: HOSPADM

## 2023-06-10 RX ORDER — HYDROXYZINE HYDROCHLORIDE 25 MG/1
25 TABLET, FILM COATED ORAL EVERY 6 HOURS PRN
Status: DISCONTINUED | OUTPATIENT
Start: 2023-06-10 | End: 2023-06-12 | Stop reason: HOSPADM

## 2023-06-10 RX ORDER — POTASSIUM CHLORIDE 20 MEQ/1
20 TABLET, EXTENDED RELEASE ORAL ONCE
Status: COMPLETED | OUTPATIENT
Start: 2023-06-10 | End: 2023-06-10

## 2023-06-10 RX ORDER — BISACODYL 5 MG/1
5 TABLET, DELAYED RELEASE ORAL ONCE
Status: COMPLETED | OUTPATIENT
Start: 2023-06-10 | End: 2023-06-10

## 2023-06-10 RX ORDER — METHYLPREDNISOLONE SODIUM SUCCINATE 40 MG/ML
40 INJECTION, POWDER, LYOPHILIZED, FOR SOLUTION INTRAMUSCULAR; INTRAVENOUS EVERY 12 HOURS SCHEDULED
Status: DISCONTINUED | OUTPATIENT
Start: 2023-06-10 | End: 2023-06-12

## 2023-06-10 RX ORDER — IPRATROPIUM BROMIDE AND ALBUTEROL SULFATE 2.5; .5 MG/3ML; MG/3ML
SOLUTION RESPIRATORY (INHALATION)
Status: COMPLETED
Start: 2023-06-10 | End: 2023-06-10

## 2023-06-10 RX ADMIN — IPRATROPIUM BROMIDE 0.5 MG: 0.5 SOLUTION RESPIRATORY (INHALATION) at 07:33

## 2023-06-10 RX ADMIN — ACETAMINOPHEN 975 MG: 325 TABLET, FILM COATED ORAL at 11:19

## 2023-06-10 RX ADMIN — ALBUTEROL SULFATE 2.5 MG: 2.5 SOLUTION RESPIRATORY (INHALATION) at 17:25

## 2023-06-10 RX ADMIN — FORMOTEROL FUMARATE DIHYDRATE 20 MCG: 20 SOLUTION RESPIRATORY (INHALATION) at 07:35

## 2023-06-10 RX ADMIN — METHYLPREDNISOLONE SODIUM SUCCINATE 40 MG: 40 INJECTION, POWDER, FOR SOLUTION INTRAMUSCULAR; INTRAVENOUS at 19:35

## 2023-06-10 RX ADMIN — POTASSIUM CHLORIDE 20 MEQ: 1500 TABLET, EXTENDED RELEASE ORAL at 15:34

## 2023-06-10 RX ADMIN — ENOXAPARIN SODIUM 40 MG: 40 INJECTION SUBCUTANEOUS at 11:16

## 2023-06-10 RX ADMIN — IPRATROPIUM BROMIDE 0.5 MG: 0.5 SOLUTION RESPIRATORY (INHALATION) at 19:56

## 2023-06-10 RX ADMIN — GUAIFENESIN 1200 MG: 600 TABLET, EXTENDED RELEASE ORAL at 21:35

## 2023-06-10 RX ADMIN — BUDESONIDE 0.5 MG: 0.5 INHALANT ORAL at 07:34

## 2023-06-10 RX ADMIN — TORSEMIDE 20 MG: 20 TABLET ORAL at 11:16

## 2023-06-10 RX ADMIN — LEVALBUTEROL HYDROCHLORIDE 1.25 MG: 1.25 SOLUTION RESPIRATORY (INHALATION) at 19:56

## 2023-06-10 RX ADMIN — BISACODYL 5 MG: 5 TABLET, COATED ORAL at 11:16

## 2023-06-10 RX ADMIN — HYDROXYZINE HYDROCHLORIDE 25 MG: 25 TABLET, FILM COATED ORAL at 22:18

## 2023-06-10 RX ADMIN — CHLORHEXIDINE GLUCONATE 15 ML: 1.2 SOLUTION ORAL at 11:16

## 2023-06-10 RX ADMIN — DOXYCYCLINE 100 MG: 100 CAPSULE ORAL at 21:35

## 2023-06-10 RX ADMIN — ASPIRIN 81 MG: 81 TABLET, COATED ORAL at 11:16

## 2023-06-10 RX ADMIN — IPRATROPIUM BROMIDE AND ALBUTEROL SULFATE 3 ML: 2.5; .5 SOLUTION RESPIRATORY (INHALATION) at 11:30

## 2023-06-10 RX ADMIN — LEVALBUTEROL HYDROCHLORIDE 1.25 MG: 1.25 SOLUTION RESPIRATORY (INHALATION) at 14:19

## 2023-06-10 RX ADMIN — TAMSULOSIN HYDROCHLORIDE 0.4 MG: 0.4 CAPSULE ORAL at 19:34

## 2023-06-10 RX ADMIN — LEVALBUTEROL HYDROCHLORIDE 1.25 MG: 1.25 SOLUTION RESPIRATORY (INHALATION) at 07:33

## 2023-06-10 RX ADMIN — DOCUSATE SODIUM 100 MG: 100 CAPSULE, LIQUID FILLED ORAL at 11:17

## 2023-06-10 RX ADMIN — ATORVASTATIN CALCIUM 40 MG: 40 TABLET, FILM COATED ORAL at 19:33

## 2023-06-10 RX ADMIN — HYDROXYZINE HYDROCHLORIDE 25 MG: 25 TABLET, FILM COATED ORAL at 15:34

## 2023-06-10 RX ADMIN — CYANOCOBALAMIN TAB 500 MCG 1000 MCG: 500 TAB at 11:16

## 2023-06-10 RX ADMIN — DOXYCYCLINE 100 MG: 100 CAPSULE ORAL at 11:16

## 2023-06-10 RX ADMIN — PREDNISONE 40 MG: 20 TABLET ORAL at 11:16

## 2023-06-10 RX ADMIN — Medication 7.5 MG: at 11:16

## 2023-06-10 RX ADMIN — CHLORHEXIDINE GLUCONATE 15 ML: 1.2 SOLUTION ORAL at 21:35

## 2023-06-10 RX ADMIN — DOCUSATE SODIUM 100 MG: 100 CAPSULE, LIQUID FILLED ORAL at 11:16

## 2023-06-10 RX ADMIN — DOCUSATE SODIUM 100 MG: 100 CAPSULE, LIQUID FILLED ORAL at 19:34

## 2023-06-10 RX ADMIN — MELATONIN 3 MG: at 22:18

## 2023-06-10 RX ADMIN — IPRATROPIUM BROMIDE 0.5 MG: 0.5 SOLUTION RESPIRATORY (INHALATION) at 14:18

## 2023-06-10 RX ADMIN — GUAIFENESIN 1200 MG: 600 TABLET, EXTENDED RELEASE ORAL at 11:16

## 2023-06-10 NOTE — PROGRESS NOTES
[facility]  Progress Note  Name: Deepa Rich  MRN: [id number]  Unit/Bed#: [facility] 205-01 I Date of Admission: 6/1/2023   Date of Service: 6/10/2023 I Hospital Day: 9    Assessment/Plan   * Chronic obstructive pulmonary disease with acute exacerbation ([facility] )  Assessment & Plan    · Pt reports he smoked 2-3ppd for approx 45 years  · History of severe COPD with multiple admissions for COPD exacerbation  · PFT on 9/16/2020 showed FEV1/FVC 30%, FVC 59% predicted and FEV1 22% predicted  · Presented with respiratory distress requiring BiPAP  · was on high flow, now weaned to 3L NC O2 (baseline) during daytime and BiPAP at night  · Continue Pulmicort, formoterol, Atrovent, Xopenex  · Was on Solu-Medrol 40 mg every 12 hours, now transitioned to Prednisone taper  · Pulmonology following  · Patient has end-stage COPD and has been seen by palliative care  He continues to want aggressive measures and to remain full code  · Downgrade to ms     Severe protein-calorie malnutrition ([facility] )  Assessment & Plan  Malnutrition Findings:   Adult Malnutrition type: Chronic illness  Adult Degree of Malnutrition: Other severe protein calorie malnutrition  Malnutrition Characteristics: Muscle loss, Fat loss                  360 Statement: severe malnutrition r/t chronic medical conditions as evidenced by Moderate-severe muscle loss around clavicles and temples, mild fat loss buccal fat pad, moderate fat loss orbital fat pad  Treatment: oral diet and oral nutrition supplements  BMI Findings: Body mass index is 19 38 kg/m²  Pneumonia  Assessment & Plan  -CXR (reviewed by me) 6/1/23: Mild right basilar airspace opacity concerning for pneumonia  Background emphysematous changes     -pt was on Vancomycin/cefepime, now on Doxy (MRSA POS nares)  -BCxs NGTD  -pulm following  -Speech therapy consulted, no change in diet recommended    Acute on chronic systolic congestive heart failure [facility])  Assessment & Plan  Wt Readings from Last 3 Encounters:   06/09/23 45 kg (99 lb 3 3 oz)   06/01/23 46 7 kg (103 lb)   05/19/23 46 8 kg (103 lb 2 8 oz)     · Echo on 5/1/2023 - LVEF 20%, severe global hypokinesis, abnormal mildly septal motion consistent with left bundle branch block and diastolic flattening of interventricular septum consistent with right ventricule volume overload  ·   · S/p 2 doses of IV Lasix 40 mg  · Continue home torsemide 20 mg daily  · Daily weights and strict ins and outs  · Neg 10 liters   · Weight down   · Will give one dose of kcl         Hyponatremia  Assessment & Plan  -chronic hyponatremia due to chronic HFrEF and 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 CA  -Currently Na 133 fell yesterday to 127   -Appreciate nephrology  -Continue increased solute intake, fluid restriction, diuretics  -s/p 7 5mg Samsca on 6/6/23 and 6/7/23 6/9 and today   Am labs   But sodium has been stable around 127-131      Chronic anemia  Assessment & Plan  · History of chronic anemia  · No active sign of bleeding  · Trend CBC  · cont home B12   · Transfuse if hemoglobin less than 7  Osteoporosis  Assessment & Plan  · DEXA scan on 2/11/2019 showed osteoporosis  · Not on any medications  Previously on bisphosphonate  · Follow-up outpatient with PCP  KEVIN and COPD overlap syndrome (HCC)  Assessment & Plan  · Continue BiPAP at night             VTE Pharmacologic Prophylaxis:   Moderate Risk (Score 3-4) - Pharmacological DVT Prophylaxis Ordered: enoxaparin (Lovenox)  Patient Centered Rounds: I performed bedside rounds with nursing staff today    Discussions with Specialists or Other Care Team Provider: will discuss with nephro    Education and Discussions with Family / Patient: pt will update family   Total Time Spent on Date of Encounter in care of patient: 35 minutes This time was spent on one or more of the following: performing physical exam; counseling and coordination of care; obtaining or reviewing history; documenting in the medical record; reviewing/ordering tests, medications or procedures; communicating with other healthcare professionals and discussing with patient's family/caregivers  Current Length of Stay: 9 day(s)  Current Patient Status: Inpatient   Certification Statement: The patient will continue to require additional inpatient hospital stay due to 2/ copd   Discharge Plan: Anticipate discharge in 48-72 hrs to rehab facility  Code Status: Level 1 - Full Code    Subjective:   Seen and examined no complaints     Objective:     Vitals:   Temp (24hrs), Av 1 °F (36 7 °C), Min:97 9 °F (36 6 °C), Max:98 3 °F (36 8 °C)    Temp:  [97 9 °F (36 6 °C)-98 3 °F (36 8 °C)] 97 9 °F (36 6 °C)  HR:  [74-86] 74  BP: ()/(59-68) 94/67  SpO2:  [90 %-100 %] 98 %  Body mass index is 19 38 kg/m²  Input and Output Summary (last 24 hours): Intake/Output Summary (Last 24 hours) at 6/10/2023 1128  Last data filed at 6/10/2023 0900  Gross per 24 hour   Intake 560 ml   Output 1900 ml   Net -1340 ml       Physical Exam:   Physical Exam  Vitals and nursing note reviewed  Constitutional:       General: He is not in acute distress  Appearance: He is well-developed  HENT:      Head: Normocephalic and atraumatic  Eyes:      Conjunctiva/sclera: Conjunctivae normal    Cardiovascular:      Rate and Rhythm: Normal rate and regular rhythm  Heart sounds: No murmur heard  Pulmonary:      Effort: Pulmonary effort is normal  No respiratory distress  Breath sounds: Wheezing present  Abdominal:      General: There is no distension  Palpations: Abdomen is soft  Tenderness: There is no abdominal tenderness  Musculoskeletal:         General: No swelling  Cervical back: Neck supple  Skin:     General: Skin is warm and dry  Capillary Refill: Capillary refill takes less than 2 seconds  Neurological:      Mental Status: He is alert  Mental status is at baseline     Psychiatric: Mood and Affect: Mood normal           Additional Data:     Labs:  Results from last 7 days   Lab Units 06/09/23  0627   EOS PCT % 0   HEMATOCRIT % 31 6*   HEMOGLOBIN g/dL 9 7*   LYMPHS PCT % 8*   MONOS PCT % 10   NEUTROS PCT % 82*   PLATELETS Thousands/uL 210   WBC Thousand/uL 11 41*     Results from last 7 days   Lab Units 06/10/23  0950   ANION GAP mmol/L 1*   BUN mg/dL 22   CALCIUM mg/dL 8 7   CHLORIDE mmol/L 87*   CO2 mmol/L 45*   CREATININE mg/dL 0 75   GLUCOSE RANDOM mg/dL 143*   POTASSIUM mmol/L 3 4*   SODIUM mmol/L 133*                       Lines/Drains:  Invasive Devices     Peripheral Intravenous Line  Duration           Peripheral IV 06/09/23 Proximal;Right;Ventral (anterior) Forearm 1 day          Drain  Duration           External Urinary Catheter Small 8 days                      Imaging: Reviewed radiology reports from this admission including: xray(s)    Recent Cultures (last 7 days):         Last 24 Hours Medication List:   Current Facility-Administered Medications   Medication Dose Route Frequency Provider Last Rate   • acetaminophen  975 mg Oral Q8H PRN Ang Lestine Najjar, DO     • aspirin  81 mg Oral Daily Ang Lestine Najjar, DO     • atorvastatin  40 mg Oral Daily With Dinner Ang Lestine Najjar, DO     • budesonide  0 5 mg Nebulization Daily Ang Lestine Najjar, DO     • chlorhexidine  15 mL Mouth/Throat Q12H [address] Ang Lestine Najjar, DO     • cyanocobalamin  1,000 mcg Oral Daily Idalia Grimaldo MD     • docusate sodium  100 mg Oral BID Ang Lestine Najjar, DO     • docusate sodium  100 mg Oral BID Donna Chamberlain MD     • doxycycline hyclate  100 mg Oral Q12H [address] Ang Lestine Najjar, DO     • enoxaparin  40 mg Subcutaneous Daily Ang Lestine Najjar, DO     • formoterol  20 mcg Nebulization Q12H Ang Lestine Najjar, DO     • guaiFENesin  1,200 mg Oral Q12H [address] Ang Lestine Najjar, DO     • ipratropium  0 5 mg Nebulization TID Ang Lestine Najjar, DO     • levalbuterol  1 25 mg Nebulization TID Ang Lestine Najjar, DO • melatonin  3 mg Oral HS Ang Oh Martinez, DO     • potassium chloride  20 mEq Oral Once Ninfa Mcguire MD     • predniSONE  40 mg Oral Daily Marigene Stare, DO      Followed by   • [START ON 6/12/2023] predniSONE  30 mg Oral Daily Marigene Stare, DO      Followed by   • [START ON 6/15/2023] predniSONE  20 mg Oral Daily Marigene Stare, DO      Followed by   • [START ON 6/18/2023] predniSONE  10 mg Oral Daily Marigene Stare, DO     • sodium chloride  1 spray Each Nare Q1H PRN Ang Oh Martinez, DO     • tamsulosin  0 4 mg Oral Daily With Dinner Ang Oh Martinez, DO     • torsemide  20 mg Oral Daily Ang Oh Martinez, DO          Today, Patient Was Seen By: Ninfa Mcguire MD    **Please Note: This note may have been constructed using a voice recognition system  **

## 2023-06-10 NOTE — ASSESSMENT & PLAN NOTE
-chronic hyponatremia due to chronic HFrEF and 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 CA  -Currently Na 133 fell yesterday to 127   -Appreciate nephrology  -Continue increased solute intake, fluid restriction, diuretics     -s/p 7 5mg Samsca on 6/6/23 and 6/7/23 6/9 and today   Am labs   But sodium has been stable around 127-131

## 2023-06-10 NOTE — ASSESSMENT & PLAN NOTE
Wt Readings from Last 3 Encounters:   06/09/23 45 kg (99 lb 3 3 oz)   06/01/23 46 7 kg (103 lb)   05/19/23 46 8 kg (103 lb 2 8 oz)     · Echo on 5/1/2023 - LVEF 20%, severe global hypokinesis, abnormal mildly septal motion consistent with left bundle branch block and diastolic flattening of interventricular septum consistent with right ventricule volume overload  ·   · S/p 2 doses of IV Lasix 40 mg  · Continue home torsemide 20 mg daily  · Daily weights and strict ins and outs    · Neg 10 liters   · Weight down   · Will give one dose of kcl

## 2023-06-10 NOTE — PROGRESS NOTES
NEPHROLOGY PROGRESS NOTE   Tammy Valenzuela  79 y o  male MRN: [id number]  Unit/Bed#: [facility] 205-01 Encounter: [id number]      ASSESSMENT & PLAN     1   Acute on chronic hyponatremia  • This is in the setting of low output congestive heart failure and prostate cancer which likely has high ADH release his sodium level is slowly rising maintained on oral torsemide and fluid restriction  • Sodium decreased a little bit to 127 additional dose of tolvaptan given this morning can repeat BMP in the morning  2   Acute on chronic hypoxic respiratory failure  • He is currently being treated for COPD exacerbation, he is currently also being diuresed  • Unfortunately his breathing remains poor  • He states this is may be slightly better  • Palliative care is seeing him  • He is currently on BiPAP but clinically feels better and maintained on 3 to 6 L nasal cannula  3   Congestive heart failure with a reduced ejection fraction  • Continue diuresis,monitor ins and outs and daily weights  • Making more urine, but bed scale weight did increase  • Chest x-ray shows mild right basilar airspace opacity and background emphysematous changes  4   Frailty and cachexia  • Continue nutritional optimization  • Okay to take Ensure 3 times daily    DISCUSSION:    Sodium did decrease a little bit he is stable around 127-131  Continue tolvaptan while he is in the hospital  No other acute changes at this time    SUBJECTIVE:    Patient was seen today  On BiPAP, which she wears overnight  Blood pressures have remained stable  Urine output and renal function is remained stable    12 point review of systems was otherwise negative besides what is mentioned above      Medications:    Current Facility-Administered Medications:   •  acetaminophen (TYLENOL) tablet 975 mg, 975 mg, Oral, Q8H PRN, Juan Carlos Robertson DO, 975 mg at 06/05/23 8566  •  aspirin (ECOTRIN LOW STRENGTH) EC tablet 81 mg, 81 mg, Oral, Daily, Juan Carlos Robertson DO, 81 mg at 06/09/23 0946  • atorvastatin (LIPITOR) tablet 40 mg, 40 mg, Oral, Daily With Dinner, Ang Thereasa Grates, DO, 40 mg at 06/09/23 1807  •  budesonide (PULMICORT) inhalation solution 0 5 mg, 0 5 mg, Nebulization, Daily, Ang Thereasa Grates, DO, 0 5 mg at 06/10/23 0734  •  chlorhexidine (PERIDEX) 0 12 % oral rinse 15 mL, 15 mL, Mouth/Throat, Q12H [facility], Ang Thereasa Grates, DO, 15 mL at 06/09/23 2101  •  cyanocobalamin (VITAMIN B-12) tablet 1,000 mcg, 1,000 mcg, Oral, Daily, Aime Zaragoza MD, 1,000 mcg at 06/09/23 0946  •  docusate sodium (COLACE) capsule 100 mg, 100 mg, Oral, BID, Ang Thereasa Grates, DO, 100 mg at 06/09/23 1806  •  doxycycline hyclate (VIBRAMYCIN) capsule 100 mg, 100 mg, Oral, Q12H [facility], Ang Thereasa Grates, DO, 100 mg at 06/09/23 2101  •  enoxaparin (LOVENOX) subcutaneous injection 40 mg, 40 mg, Subcutaneous, Daily, Ang Thereasa Grates, DO, 40 mg at 06/09/23 0946  •  formoterol (PERFOROMIST) nebulizer solution 20 mcg, 20 mcg, Nebulization, Q12H, Ang Thereasa Ingrids, DO, 20 mcg at 06/10/23 0735  •  guaiFENesin (MUCINEX) 12 hr tablet 1,200 mg, 1,200 mg, Oral, Q12H [facility], Ang Thereasa Grates, DO, 1,200 mg at 06/09/23 2101  •  ipratropium (ATROVENT) 0 02 % inhalation solution 0 5 mg, 0 5 mg, Nebulization, TID, Ang Thereasa Ingrids, DO, 0 5 mg at 06/10/23 9773  •  levalbuterol (XOPENEX) inhalation solution 1 25 mg, 1 25 mg, Nebulization, TID, Ang Oh Quinteros, DO, 1 25 mg at 06/10/23 2952  •  melatonin tablet 3 mg, 3 mg, Oral, HS, Ang Oh Grates, DO, 3 mg at 06/09/23 2101  •  predniSONE tablet 40 mg, 40 mg, Oral, Daily, 40 mg at 06/09/23 0946 **FOLLOWED BY** [START ON 6/12/2023] predniSONE tablet 30 mg, 30 mg, Oral, Daily **FOLLOWED BY** [START ON 6/15/2023] predniSONE tablet 20 mg, 20 mg, Oral, Daily **FOLLOWED BY** [START ON 6/18/2023] predniSONE tablet 10 mg, 10 mg, Oral, Daily, Sparkle Pete DO  •  sodium chloride (OCEAN) 0 65 % nasal spray 1 spray, 1 spray, Each Nare, Q1H PRN, Juan Carlos Martinez DO, 1 spray at 06/04/23 0654  •  tamsulosin [facility]) capsule 0 4 mg, 0 4 mg, Oral, Daily With Dinner, Ang Remedios Kathya, DO, 0 4 mg at 06/09/23 1806  •  tolvaptan (Samsca) split tablet 7 5 mg, 7 5 mg, Oral, Once, Bia Chisholm MD  •  torsemide [facility]) tablet 20 mg, 20 mg, Oral, Daily, Ang Remedios Kathya, DO, 20 mg at 06/09/23 0947    OBJECTIVE:    Vitals:    06/09/23 2005 06/09/23 2300 06/10/23 0010 06/10/23 0744   BP: 114/68  94/67    BP Location:       Pulse: 84  74    Resp:       Temp: 97 9 °F (36 6 °C) 97 9 °F (36 6 °C)     TempSrc: Oral Axillary     SpO2: 96%  100% 98%   Weight:       Height:            Temp:  [97 9 °F (36 6 °C)-98 3 °F (36 8 °C)] 97 9 °F (36 6 °C)  HR:  [74-86] 74  BP: ()/(59-68) 94/67  SpO2:  [90 %-100 %] 98 %     Body mass index is 19 38 kg/m²  Weight (last 2 days)     Date/Time Weight    06/09/23 0600 45 (99 21)    06/08/23 0600 48 1 (106 02)          I/O last 3 completed shifts: In: 438 [P O :438]  Out: 9931 [Urine:2788]    No intake/output data recorded        Physical exam:    General: Frail and elderly  Eyes: conjunctivae pink, anicteric sclerae  ENT: lips and mucous membranes moist, no exudates, normal external ears  Neck: ROM intact, no JVD  Chest: Coarse breath sounds  CVS: normal rate, non pericardial friction rub  Abdomen: soft, non-tender, non-distended, normoactive bowel sounds  Extremities: no edema of both legs  Skin: no rash  Neuro: awake, alert, oriented, grossly intact  Psych:  Pleasant affect    Invasive Devices:      Lab Results:   Results from last 7 days   Lab Units 06/09/23 2052 06/09/23 0644 06/09/23 0627 06/08/23 2018 06/08/23  0852 06/08/23  0530 06/05/23  0820 06/05/23  0539 06/04/23  0929 06/04/23  0454   BUN mg/dL 27* 27* 27* 30* 29*  --    < >  --    < >  --    CALCIUM mg/dL 8 4 8 8 8 4 8 3 8 8  --    < >  --    < >  --    CHLORIDE mmol/L 84* 86* 87* 82* 88*  --    < >  --    < >  --    CO2 mmol/L 43* >45* >45* >45* >45*  --    < >  --    < >  --    CREATININE mg/dL 0 89 0 75 0 74 0 83 0 82  -- " < >  --    < >  --    HEMATOCRIT %  --   --  31 6*  --   --  33 0*  --  32 5*  --  32 8*   HEMOGLOBIN g/dL  --   --  9 7*  --   --  9 9*  --  10 8*  --  11 0*   POTASSIUM mmol/L 3 7 3 7 3 6 3 1* 4 1  --    < >  --    < >  --    MAGNESIUM mg/dL  --   --   --   --   --   --   --  1 6  --  1 8   PHOSPHORUS mg/dL  --   --   --   --   --   --   --  2 9  --  3 0   PLATELETS Thousands/uL  --   --  210  --   --  200  --  175  --  186   WBC Thousand/uL  --   --  11 41*  --   --  9 72  --  8 15  --  5 19    < > = values in this interval not displayed  Portions of the record may have been created with voice recognition software  Occasional wrong word or \"sound a like\" substitutions may have occurred due to the inherent limitations of voice recognition software  Read the chart carefully and recognize, using context, where substitutions have occurred  If you have any questions, please contact the dictating provider      "

## 2023-06-10 NOTE — QUICK NOTE
Became sob had tx neb- cxr done I reviewed seems similar to prior due to exp wheezing dc prednisone taper and changed to iv solumedrol 40 mg Iv bid as reviewed previous notes on previous days was not wheezing   Wife updated over the phone

## 2023-06-10 NOTE — ASSESSMENT & PLAN NOTE
Malnutrition Findings:   Adult Malnutrition type: Chronic illness  Adult Degree of Malnutrition: Other severe protein calorie malnutrition  Malnutrition Characteristics: Muscle loss, Fat loss                  360 Statement: severe malnutrition r/t chronic medical conditions as evidenced by Moderate-severe muscle loss around clavicles and temples, mild fat loss buccal fat pad, moderate fat loss orbital fat pad  Treatment: oral diet and oral nutrition supplements  BMI Findings: Body mass index is 19 38 kg/m²

## 2023-06-10 NOTE — ASSESSMENT & PLAN NOTE
· Pt reports he smoked 2-3ppd for approx 45 years  · History of severe COPD with multiple admissions for COPD exacerbation  · PFT on 9/16/2020 showed FEV1/FVC 30%, FVC 59% predicted and FEV1 22% predicted  · Presented with respiratory distress requiring BiPAP  · was on high flow, now weaned to 3L NC O2 (baseline) during daytime and BiPAP at night  · Continue Pulmicort, formoterol, Atrovent, Xopenex  · Was on Solu-Medrol 40 mg every 12 hours, now transitioned to Prednisone taper  · Pulmonology following  · Patient has end-stage COPD and has been seen by palliative care  He continues to want aggressive measures and to remain full code    · Downgrade to ms

## 2023-06-11 LAB
BUN SERPL-MCNC: 25 MG/DL (ref 5–25)
CALCIUM SERPL-MCNC: 9.1 MG/DL (ref 8.3–10.1)
CHLORIDE SERPL-SCNC: 87 MMOL/L (ref 96–108)
CO2 SERPL-SCNC: >45 MMOL/L (ref 21–32)
CREAT SERPL-MCNC: 0.75 MG/DL (ref 0.6–1.3)
GFR SERPL CREATININE-BSD FRML MDRD: 95 ML/MIN/1.73SQ M
GLUCOSE SERPL-MCNC: 118 MG/DL (ref 65–140)
POTASSIUM SERPL-SCNC: 4.1 MMOL/L (ref 3.5–5.3)
SODIUM SERPL-SCNC: 131 MMOL/L (ref 135–147)

## 2023-06-11 PROCEDURE — 94640 AIRWAY INHALATION TREATMENT: CPT

## 2023-06-11 PROCEDURE — 99232 SBSQ HOSP IP/OBS MODERATE 35: CPT | Performed by: INTERNAL MEDICINE

## 2023-06-11 PROCEDURE — 94760 N-INVAS EAR/PLS OXIMETRY 1: CPT

## 2023-06-11 PROCEDURE — 99232 SBSQ HOSP IP/OBS MODERATE 35: CPT | Performed by: NURSE PRACTITIONER

## 2023-06-11 PROCEDURE — 80048 BASIC METABOLIC PNL TOTAL CA: CPT | Performed by: INTERNAL MEDICINE

## 2023-06-11 PROCEDURE — 94003 VENT MGMT INPAT SUBQ DAY: CPT

## 2023-06-11 RX ADMIN — TORSEMIDE 20 MG: 20 TABLET ORAL at 10:26

## 2023-06-11 RX ADMIN — ASPIRIN 81 MG: 81 TABLET, COATED ORAL at 10:26

## 2023-06-11 RX ADMIN — DOXYCYCLINE 100 MG: 100 CAPSULE ORAL at 10:26

## 2023-06-11 RX ADMIN — MELATONIN 3 MG: at 21:16

## 2023-06-11 RX ADMIN — LEVALBUTEROL HYDROCHLORIDE 1.25 MG: 1.25 SOLUTION RESPIRATORY (INHALATION) at 13:24

## 2023-06-11 RX ADMIN — CHLORHEXIDINE GLUCONATE 15 ML: 1.2 SOLUTION ORAL at 10:25

## 2023-06-11 RX ADMIN — ENOXAPARIN SODIUM 40 MG: 40 INJECTION SUBCUTANEOUS at 10:25

## 2023-06-11 RX ADMIN — HYDROXYZINE HYDROCHLORIDE 25 MG: 25 TABLET, FILM COATED ORAL at 21:16

## 2023-06-11 RX ADMIN — LEVALBUTEROL HYDROCHLORIDE 1.25 MG: 1.25 SOLUTION RESPIRATORY (INHALATION) at 08:16

## 2023-06-11 RX ADMIN — ATORVASTATIN CALCIUM 40 MG: 40 TABLET, FILM COATED ORAL at 17:19

## 2023-06-11 RX ADMIN — DOCUSATE SODIUM 100 MG: 100 CAPSULE, LIQUID FILLED ORAL at 10:26

## 2023-06-11 RX ADMIN — CHLORHEXIDINE GLUCONATE 15 ML: 1.2 SOLUTION ORAL at 21:15

## 2023-06-11 RX ADMIN — IPRATROPIUM BROMIDE 0.5 MG: 0.5 SOLUTION RESPIRATORY (INHALATION) at 13:24

## 2023-06-11 RX ADMIN — IPRATROPIUM BROMIDE 0.5 MG: 0.5 SOLUTION RESPIRATORY (INHALATION) at 19:52

## 2023-06-11 RX ADMIN — GUAIFENESIN 1200 MG: 600 TABLET, EXTENDED RELEASE ORAL at 10:26

## 2023-06-11 RX ADMIN — DOCUSATE SODIUM 100 MG: 100 CAPSULE, LIQUID FILLED ORAL at 17:19

## 2023-06-11 RX ADMIN — TAMSULOSIN HYDROCHLORIDE 0.4 MG: 0.4 CAPSULE ORAL at 17:19

## 2023-06-11 RX ADMIN — GUAIFENESIN 1200 MG: 600 TABLET, EXTENDED RELEASE ORAL at 21:16

## 2023-06-11 RX ADMIN — ALBUTEROL SULFATE 2.5 MG: 2.5 SOLUTION RESPIRATORY (INHALATION) at 13:25

## 2023-06-11 RX ADMIN — DOXYCYCLINE 100 MG: 100 CAPSULE ORAL at 21:15

## 2023-06-11 RX ADMIN — LEVALBUTEROL HYDROCHLORIDE 1.25 MG: 1.25 SOLUTION RESPIRATORY (INHALATION) at 19:52

## 2023-06-11 RX ADMIN — IPRATROPIUM BROMIDE 0.5 MG: 0.5 SOLUTION RESPIRATORY (INHALATION) at 08:16

## 2023-06-11 RX ADMIN — METHYLPREDNISOLONE SODIUM SUCCINATE 40 MG: 40 INJECTION, POWDER, FOR SOLUTION INTRAMUSCULAR; INTRAVENOUS at 21:15

## 2023-06-11 RX ADMIN — CYANOCOBALAMIN TAB 500 MCG 1000 MCG: 500 TAB at 10:26

## 2023-06-11 RX ADMIN — BUDESONIDE 0.5 MG: 0.5 INHALANT ORAL at 08:16

## 2023-06-11 RX ADMIN — METHYLPREDNISOLONE SODIUM SUCCINATE 40 MG: 40 INJECTION, POWDER, FOR SOLUTION INTRAMUSCULAR; INTRAVENOUS at 10:27

## 2023-06-11 RX ADMIN — FORMOTEROL FUMARATE DIHYDRATE 20 MCG: 20 SOLUTION RESPIRATORY (INHALATION) at 19:52

## 2023-06-11 NOTE — ASSESSMENT & PLAN NOTE
· Pt reports he smoked 2-3ppd for approx 45 years  · History of severe COPD with multiple admissions for COPD exacerbation  · PFT on 9/16/2020 showed FEV1/FVC 30%, FVC 59% predicted and FEV1 22% predicted  · Presented with respiratory distress requiring BiPAP  · was on high flow, now weaned to 3L NC O2 (baseline) during daytime and BiPAP at night  · Continue Pulmicort, formoterol, Atrovent, Xopenex  · Was on Solu-Medrol 40 mg every 12 hrs will transition to po prednisone tomorrow pt requesting to go home rather frustrated   · Pulmonology following  · Patient has end-stage COPD and has been seen by palliative care  He continues to want aggressive measures and to remain full code    · Downgrade to ms

## 2023-06-11 NOTE — ASSESSMENT & PLAN NOTE
Wt Readings from Last 3 Encounters:   06/11/23 45 8 kg (101 lb)   06/01/23 46 7 kg (103 lb)   05/19/23 46 8 kg (103 lb 2 8 oz)     · Echo on 5/1/2023 - LVEF 20%, severe global hypokinesis, abnormal mildly septal motion consistent with left bundle branch block and diastolic flattening of interventricular septum consistent with right ventricule volume overload  ·   · S/p 2 doses of IV Lasix 40 mg  · Continue home torsemide 20 mg daily  · Daily weights and strict ins and outs    · Weight down   · Will give one dose of kcl

## 2023-06-11 NOTE — PROGRESS NOTES
[address] [facility]  Progress Note  Name: Aries Pavon  MRN: [id number]  Unit/Bed#: -48 I Date of Admission: 6/1/2023   Date of Service: 6/11/2023 I Hospital Day: 10    Assessment/Plan   * Chronic obstructive pulmonary disease with acute exacerbation ([facility] )  Assessment & Plan    · Pt reports he smoked 2-3ppd for approx 45 years  · History of severe COPD with multiple admissions for COPD exacerbation  · PFT on 9/16/2020 showed FEV1/FVC 30%, FVC 59% predicted and FEV1 22% predicted  · Presented with respiratory distress requiring BiPAP  · was on high flow, now weaned to 3L NC O2 (baseline) during daytime and BiPAP at night  · Continue Pulmicort, formoterol, Atrovent, Xopenex  · Was on Solu-Medrol 40 mg every 12 hrs will transition to po prednisone tomorrow pt requesting to go home rather frustrated   · Pulmonology following  · Patient has end-stage COPD and has been seen by palliative care  He continues to want aggressive measures and to remain full code  · Downgrade to ms     Severe protein-calorie malnutrition ([facility] )  Assessment & Plan  Malnutrition Findings:   Adult Malnutrition type: Chronic illness  Adult Degree of Malnutrition: Other severe protein calorie malnutrition  Malnutrition Characteristics: Muscle loss, Fat loss                  360 Statement: severe malnutrition r/t chronic medical conditions as evidenced by Moderate-severe muscle loss around clavicles and temples, mild fat loss buccal fat pad, moderate fat loss orbital fat pad  Treatment: oral diet and oral nutrition supplements  BMI Findings: Body mass index is 19 73 kg/m²  Pneumonia  Assessment & Plan  · CXR (reviewed by me) 6/1/23: Mild right basilar airspace opacity concerning for pneumonia  Background emphysematous changes     · Recently on vancomycin/cefepime discontinued on 6/10/2023, now on Doxy (MRSA POS nares)  · -BCxs NGTD  · -pulm following  · -Speech therapy consulted, no change in diet recommended    Acute on chronic systolic congestive heart failure (HCC)  Assessment & Plan  Wt Readings from Last 3 Encounters:   06/11/23 45 8 kg (101 lb)   06/01/23 46 7 kg (103 lb)   05/19/23 46 8 kg (103 lb 2 8 oz)     · Echo on 5/1/2023 - LVEF 20%, severe global hypokinesis, abnormal mildly septal motion consistent with left bundle branch block and diastolic flattening of interventricular septum consistent with right ventricule volume overload  ·   · S/p 2 doses of IV Lasix 40 mg  · Continue home torsemide 20 mg daily  · Daily weights and strict ins and outs  · Weight down   · Will give one dose of kcl         Hyponatremia  Assessment & Plan  · chronic hyponatremia due to chronic HFrEF and 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 CA  · Currently Na 131 , discussed with nephrology will hold Samsca today  · Appreciate nephrology  · Continue increased solute intake, fluid restriction, diuretics  · Am labs   · But sodium has been stable around 127-131  · If sodium level remains stable and able to wean off of steroid patient requesting to go home tomorrow  Chronic anemia  Assessment & Plan  · History of chronic anemia  · No active sign of bleeding  · Trend CBC  · cont home B12   · Transfuse if hemoglobin less than 7  VTE Pharmacologic Prophylaxis:   High Risk (Score >/= 5) - Pharmacological DVT Prophylaxis Ordered: enoxaparin (Lovenox)  Sequential Compression Devices Ordered  Patient Centered Rounds: I performed bedside rounds with nursing staff today  Discussions with Specialists or Other Care Team Provider: nursing     Education and Discussions with Family / Patient: Updated  (wife and son) at bedside      Total Time Spent on Date of Encounter in care of patient: 55 minutes This time was spent on one or more of the following: performing physical exam; counseling and coordination of care; obtaining or reviewing history; documenting in the medical record; reviewing/ordering tests, medications or procedures; communicating with other healthcare professionals and discussing with patient's family/caregivers  Current Length of Stay: 10 day(s)  Current Patient Status: Inpatient   Certification Statement: The patient will continue to require additional inpatient hospital stay due to ongoing treatment and pt with little tolerance becomes sob very quickly   Discharge Plan: Anticipate discharge in 24-48 hrs to Extensive discussion had patient wife and son at bedside  Patient is adamant that he would like to return home  Code Status: Level 1 - Full Code    Subjective:   Patient is angry and upset yelling reports that he is tired of being here and would like to return home he does not know what the plan is  Extensive discussion had with patient in regards to his complexity and inability to wean off steroids and oxygen as he gets worse very quickly  Patient reports that he wants to walk he also reports he would like to go outside and get some fresh air  Discussion had with nursing staff who agree that they will attempt to ambulate patient even if for short distance with therapy and take down for a few minutes in wheelchair  On check back with nursing patient has refused multiple times after being offered    Objective:     Vitals:   Temp (24hrs), Av 9 °F (36 6 °C), Min:97 6 °F (36 4 °C), Max:98 2 °F (36 8 °C)    Temp:  [97 6 °F (36 4 °C)-98 2 °F (36 8 °C)] 97 6 °F (36 4 °C)  HR:  [] 91  Resp:  [17-24] 17  BP: ()/(62-69) 111/64  SpO2:  [89 %-100 %] 90 %  Body mass index is 19 73 kg/m²  Input and Output Summary (last 24 hours): Intake/Output Summary (Last 24 hours) at 2023 1514  Last data filed at 2023 1500  Gross per 24 hour   Intake 600 ml   Output 825 ml   Net -225 ml       Physical Exam:   Physical Exam  Constitutional:       General: He is not in acute distress  Appearance: He is ill-appearing   He is not toxic-appearing or diaphoretic  Comments: Cachectic barrel chest   HENT:      Head: Normocephalic and atraumatic  Mouth/Throat:      Pharynx: Oropharynx is clear  Eyes:      General:         Right eye: No discharge  Left eye: No discharge  Conjunctiva/sclera: Conjunctivae normal    Cardiovascular:      Rate and Rhythm: Normal rate  Heart sounds: Murmur heard  No friction rub  No gallop  Pulmonary:      Effort: No respiratory distress  Breath sounds: No stridor  Wheezing (Slight wheeze on right lobe) and rales present  No rhonchi  Chest:      Chest wall: No tenderness  Abdominal:      General: There is no distension  Palpations: There is no mass  Tenderness: There is no abdominal tenderness  There is no guarding or rebound  Hernia: No hernia is present  Musculoskeletal:         General: No swelling, tenderness, deformity or signs of injury  Right lower leg: No edema  Left lower leg: No edema  Skin:     Coloration: Skin is not jaundiced or pale  Findings: No bruising, erythema, lesion or rash  Neurological:      Mental Status: He is alert     Psychiatric:      Comments: Anxious angry          Additional Data:     Labs:  Results from last 7 days   Lab Units 06/09/23  0627   EOS PCT % 0   HEMATOCRIT % 31 6*   HEMOGLOBIN g/dL 9 7*   LYMPHS PCT % 8*   MONOS PCT % 10   NEUTROS PCT % 82*   PLATELETS Thousands/uL 210   WBC Thousand/uL 11 41*     Results from last 7 days   Lab Units 06/11/23  0753 06/10/23  2155   ANION GAP mmol/L  --  4   BUN mg/dL 25 27*   CALCIUM mg/dL 9 1 8 6   CHLORIDE mmol/L 87* 83*   CO2 mmol/L >45* 43*   CREATININE mg/dL 0 75 1 31*   GLUCOSE RANDOM mg/dL 118 341*   POTASSIUM mmol/L 4 1 4 2   SODIUM mmol/L 131* 130*                       Lines/Drains:  Invasive Devices     Peripheral Intravenous Line  Duration           Peripheral IV 06/09/23 Proximal;Right;Ventral (anterior) Forearm 2 days          Drain  Duration           External Urinary Catheter Small 9 days                      Imaging: Reviewed radiology reports from this admission including: chest xray    Recent Cultures (last 7 days):         Last 24 Hours Medication List:   Current Facility-Administered Medications   Medication Dose Route Frequency Provider Last Rate   • acetaminophen  975 mg Oral Q8H PRN Leane Irons, PA-C     • albuterol  2 5 mg Nebulization Q4H PRN Leane Irons, PA-C     • aspirin  81 mg Oral Daily Leane Irons, PA-C     • atorvastatin  40 mg Oral Daily With [address] , PA-C     • budesonide  0 5 mg Nebulization Daily Leane Irons, PA-C     • chlorhexidine  15 mL Mouth/Throat Q12H [address] Leane Irons, PA-C     • cyanocobalamin  1,000 mcg Oral Daily Leane Irons, PA-C     • docusate sodium  100 mg Oral BID Leane Irons, PA-C     • docusate sodium  100 mg Oral BID Leane Irons, PA-C     • doxycycline hyclate  100 mg Oral Q12H [address] Leane Irons, PA-C     • enoxaparin  40 mg Subcutaneous Daily Leane Irons, PA-C     • formoterol  20 mcg Nebulization Q12H Leane Irons, PA-C     • guaiFENesin  1,200 mg Oral Q12H [address] Leane Irons, PA-C     • hydrOXYzine HCL  25 mg Oral Q6H PRN Leane Irons, PA-C     • ipratropium  0 5 mg Nebulization TID Leane Irons, PA-C     • levalbuterol  1 25 mg Nebulization TID Leane Irons, PA-C     • melatonin  3 mg Oral HS Leane Irons, PA-C     • methylPREDNISolone sodium succinate  40 mg Intravenous Q12H [address] Leane Irons, PA-C     • sodium chloride  1 spray Each Nare Q1H PRN Leane Irons, PA-C     • tamsulosin  0 4 mg Oral Daily With Dinner Leane Irons, PA-C     • torsemide  20 mg Oral Daily Leane Irons, PA-C          Today, Patient Was Seen By: DELIA Torrez    **Please Note: This note may have been constructed using a voice recognition system  **

## 2023-06-11 NOTE — ASSESSMENT & PLAN NOTE
· chronic hyponatremia due to chronic HFrEF and 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 CA  · Currently Na 131 , discussed with nephrology will hold Samsca today  · Appreciate nephrology  · Continue increased solute intake, fluid restriction, diuretics  · Am labs   · But sodium has been stable around 127-131  · If sodium level remains stable and able to wean off of steroid patient requesting to go home tomorrow

## 2023-06-11 NOTE — ASSESSMENT & PLAN NOTE
· CXR (reviewed by me) 6/1/23: Mild right basilar airspace opacity concerning for pneumonia  Background emphysematous changes     · Recently on vancomycin/cefepime discontinued on 6/10/2023, now on Doxy (MRSA POS nares)  · -BCxs NGTD  · -pulm following  · -Speech therapy consulted, no change in diet recommended

## 2023-06-11 NOTE — PROGRESS NOTES
NEPHROLOGY PROGRESS NOTE   Mary Ramirez  79 y o  male MRN: [id number]  Unit/Bed#: -01 Encounter: [id number]        ASSESSMENT & PLAN     1   Acute on chronic hyponatremia  • This is in the setting of low output congestive heart failure and prostate cancer which likely has high ADH release his sodium level is slowly rising maintained on oral torsemide and fluid restriction  • Sodiums have remained around 130  • BMP is active in process today if sodium is less than 130 will consider giving an additional dose of tolvaptan today    2   Acute on chronic hypoxic respiratory failure  • He is currently being treated for COPD exacerbation, he is currently also being diuresed  • Unfortunately his breathing remains poor  • He states this is may be slightly better  • Palliative care is seeing him  • Now on 3 L nasal cannula    3   Congestive heart failure with a reduced ejection fraction  • Continue diuresis,monitor ins and outs and daily weights  • Making more urine, but bed scale weight did increase  • Chest x-ray shows mild right basilar airspace opacity and background emphysematous changes    4   Frailty and cachexia  • Continue nutritional optimization  • Okay to take Ensure 3 times daily    DISCUSSION:    Long discussion today regarding his sodium level  Patient did not have insight into why this continues to decrease  We discussed his overall clinical condition given his advanced comorbidities, decreased solute intake, frailty, his sodium level does drop he is tolvaptan responsive although this would be difficult to give him as an outpatient    We will need to find a regimen of medications that would allow this to be stable and if this cannot be achieved this is unfortunately a poor prognostic signal    SUBJECTIVE:    The patient was seen today  Now on 3 L nasal cannula  He is somewhat anxious about what to get out of bed ambulate    12 point review of systems was otherwise negative besides what is mentioned above     Medications:    Current Facility-Administered Medications:   •  acetaminophen (TYLENOL) tablet 975 mg, 975 mg, Oral, Q8H PRN, Ken Ramsey PA-C, 975 mg at 06/10/23 1119  •  albuterol inhalation solution 2 5 mg, 2 5 mg, Nebulization, Q4H PRN, Ken Ramsey PA-C, 2 5 mg at 06/10/23 1725  •  aspirin (ECOTRIN LOW STRENGTH) EC tablet 81 mg, 81 mg, Oral, Daily, Ken Ramsey PA-C, 81 mg at 06/10/23 1116  •  atorvastatin (LIPITOR) tablet 40 mg, 40 mg, Oral, Daily With Dinner, Ken Ramsey PA-C, 40 mg at 06/10/23 1933  •  budesonide (PULMICORT) inhalation solution 0 5 mg, 0 5 mg, Nebulization, Daily, Ken Ramsey PA-C, 0 5 mg at 06/11/23 0816  •  chlorhexidine (PERIDEX) 0 12 % oral rinse 15 mL, 15 mL, Mouth/Throat, Q12H [facility], Hannah Moore PA-C, 15 mL at 06/10/23 2135  •  cyanocobalamin (VITAMIN B-12) tablet 1,000 mcg, 1,000 mcg, Oral, Daily, Ken Ramsey PA-C, 1,000 mcg at 06/10/23 1116  •  docusate sodium (COLACE) capsule 100 mg, 100 mg, Oral, BID, Ken Ramsey PA-C, 100 mg at 06/10/23 1117  •  docusate sodium (COLACE) capsule 100 mg, 100 mg, Oral, BID, Ken Ramsey PA-C, 100 mg at 06/10/23 1934  •  doxycycline hyclate (VIBRAMYCIN) capsule 100 mg, 100 mg, Oral, Q12H [facility], Ken Ramsey PA-C, 100 mg at 06/10/23 2135  •  enoxaparin (LOVENOX) subcutaneous injection 40 mg, 40 mg, Subcutaneous, Daily, Ken Ramsey PA-C, 40 mg at 06/10/23 1116  •  formoterol (PERFOROMIST) nebulizer solution 20 mcg, 20 mcg, Nebulization, Q12H, Ken Ramsey PA-C, 20 mcg at 06/10/23 0735  •  guaiFENesin (MUCINEX) 12 hr tablet 1,200 mg, 1,200 mg, Oral, Q12H [facility], Ken Ramsey PA-C, 1,200 mg at 06/10/23 2135  •  hydrOXYzine HCL (ATARAX) tablet 25 mg, 25 mg, Oral, Q6H PRN, Ken Ramsey PA-C, 25 mg at 06/10/23 2218  •  ipratropium (ATROVENT) 0 02 % inhalation solution 0 5 mg, 0 5 mg, Nebulization, TID, Ken Ramsey PA-C, 0 5 mg at 06/11/23 0816  • levalbuterol (XOPENEX) inhalation solution 1 25 mg, 1 25 mg, Nebulization, TID, Anand Holmphilippe PA-C, 1 25 mg at 06/11/23 0816  •  melatonin tablet 3 mg, 3 mg, Oral, HS, Anand Holmnitzaor, PA-C, 3 mg at 06/10/23 2218  •  methylPREDNISolone sodium succinate (Solu-MEDROL) injection 40 mg, 40 mg, Intravenous, Q12H [facility], Anand ARTI Bass-C, 40 mg at 06/10/23 1935  •  sodium chloride (OCEAN) 0 65 % nasal spray 1 spray, 1 spray, Each Nare, Q1H PRN, Anandcarli Bass PA-C, 1 spray at 06/04/23 5667  •  tamsulosin (FLOMAX) capsule 0 4 mg, 0 4 mg, Oral, Daily With Dinner, Anandcarli Bass PA-C, 0 4 mg at 06/10/23 1934  •  torsemide (DEMADEX) tablet 20 mg, 20 mg, Oral, Daily, Anand Kali PA-C, 20 mg at 06/10/23 1116    OBJECTIVE:    Vitals:    06/10/23 2254 06/10/23 2256 06/11/23 0234 06/11/23 0600   BP:   120/69    BP Location:       Pulse:   73    Resp:       Temp:       TempSrc:       SpO2: 95% 98% 98%    Weight:    45 8 kg (101 lb)   Height:            Temp:  [97 6 °F (36 4 °C)-98 2 °F (36 8 °C)] 97 6 °F (36 4 °C)  HR:  [] 73  Resp:  [18-24] 18  BP: ()/(55-69) 120/69  SpO2:  [92 %-100 %] 98 %     Body mass index is 19 73 kg/m²  Weight (last 2 days)     Date/Time Weight    06/11/23 0600 45 8 (101)    06/09/23 0600 45 (99 21)          I/O last 3 completed shifts: In: 9986 [P O :1078]  Out: 6723 [Urine:1659]    No intake/output data recorded        Physical exam:    General: Frail, cachectic  Eyes: conjunctivae pink, anicteric sclerae  ENT: lips and mucous membranes moist, no exudates, normal external ears  Neck: ROM intact, no JVD  Chest: No respiratory distress, positive accessory muscle use  CVS: normal rate, non pericardial friction rub  Abdomen: soft, non-tender, non-distended, normoactive bowel sounds  Extremities: no edema of both legs  Skin: no rash  Neuro: Somewhat anxious       Lab Results:   Results from last 7 days   Lab Units 06/10/23  2155 06/10/23  0950 06/09/23 2052 "06/09/23  0644 06/09/23  0627 06/08/23  0852 06/08/23  0530 06/05/23  0820 06/05/23  0539   BUN mg/dL 27* 22 27* 27* 27*   < >  --    < >  --    CALCIUM mg/dL 8 6 8 7 8 4 8 8 8 4   < >  --    < >  --    CHLORIDE mmol/L 83* 87* 84* 86* 87*   < >  --    < >  --    CO2 mmol/L 43* 45* 43* >45* >45*   < >  --    < >  --    CREATININE mg/dL 1 31* 0 75 0 89 0 75 0 74   < >  --    < >  --    HEMATOCRIT %  --   --   --   --  31 6*  --  33 0*  --  32 5*   HEMOGLOBIN g/dL  --   --   --   --  9 7*  --  9 9*  --  10 8*   POTASSIUM mmol/L 4 2 3 4* 3 7 3 7 3 6   < >  --    < >  --    MAGNESIUM mg/dL  --   --   --   --   --   --   --   --  1 6   PHOSPHORUS mg/dL  --   --   --   --   --   --   --   --  2 9   PLATELETS Thousands/uL  --   --   --   --  210  --  200  --  175   WBC Thousand/uL  --   --   --   --  11 41*  --  9 72  --  8 15    < > = values in this interval not displayed  Portions of the record may have been created with voice recognition software  Occasional wrong word or \"sound a like\" substitutions may have occurred due to the inherent limitations of voice recognition software  Read the chart carefully and recognize, using context, where substitutions have occurred  If you have any questions, please contact the dictating provider      "

## 2023-06-11 NOTE — ASSESSMENT & PLAN NOTE
Malnutrition Findings:   Adult Malnutrition type: Chronic illness  Adult Degree of Malnutrition: Other severe protein calorie malnutrition  Malnutrition Characteristics: Muscle loss, Fat loss                  360 Statement: severe malnutrition r/t chronic medical conditions as evidenced by Moderate-severe muscle loss around clavicles and temples, mild fat loss buccal fat pad, moderate fat loss orbital fat pad  Treatment: oral diet and oral nutrition supplements  BMI Findings: Body mass index is 19 73 kg/m²

## 2023-06-11 NOTE — PLAN OF CARE
Problem: MOBILITY - ADULT  Goal: Maintain or return to baseline ADL function  Description: INTERVENTIONS:  -  Assess patient's ability to carry out ADLs; assess patient's baseline for ADL function and identify physical deficits which impact ability to perform ADLs (bathing, care of mouth/teeth, toileting, grooming, dressing, etc )  - Assess/evaluate cause of self-care deficits   - Assess range of motion  - Assess patient's mobility; develop plan if impaired  - Assess patient's need for assistive devices and provide as appropriate  - Encourage maximum independence but intervene and supervise when necessary  - Involve family in performance of ADLs  - Assess for home care needs following discharge   - Consider OT consult to assist with ADL evaluation and planning for discharge  - Provide patient education as appropriate  Outcome: Progressing       Problem: RESPIRATORY - ADULT  Goal: Achieves optimal ventilation and oxygenation  Description: INTERVENTIONS:  - Assess for changes in respiratory status  - Assess for changes in mentation and behavior  - Position to facilitate oxygenation and minimize respiratory effort  - Oxygen administered by appropriate delivery if ordered  - Initiate smoking cessation education as indicated  - Encourage broncho-pulmonary hygiene including cough, deep breathe, Incentive Spirometry  - Assess the need for suctioning and aspirate as needed  - Assess and instruct to report SOB or any respiratory difficulty  - Respiratory Therapy support as indicated  Outcome: Progressing

## 2023-06-12 ENCOUNTER — TELEPHONE (OUTPATIENT)
Dept: NEPHROLOGY | Facility: CLINIC | Age: 68
End: 2023-06-12

## 2023-06-12 VITALS
OXYGEN SATURATION: 93 % | WEIGHT: 105.3 LBS | SYSTOLIC BLOOD PRESSURE: 132 MMHG | HEART RATE: 100 BPM | RESPIRATION RATE: 18 BRPM | TEMPERATURE: 98.8 F | DIASTOLIC BLOOD PRESSURE: 78 MMHG | HEIGHT: 60 IN | BODY MASS INDEX: 20.67 KG/M2

## 2023-06-12 DIAGNOSIS — E87.1 HYPONATREMIA: Primary | ICD-10-CM

## 2023-06-12 PROBLEM — E87.3 ALKALOSIS: Status: ACTIVE | Noted: 2023-06-12

## 2023-06-12 LAB
ANION GAP SERPL CALCULATED.3IONS-SCNC: 2 MMOL/L (ref 4–13)
BASOPHILS # BLD MANUAL: 0 THOUSAND/UL (ref 0–0.1)
BASOPHILS NFR MAR MANUAL: 0 % (ref 0–1)
BUN SERPL-MCNC: 35 MG/DL (ref 5–25)
CALCIUM SERPL-MCNC: 9.2 MG/DL (ref 8.3–10.1)
CHLORIDE SERPL-SCNC: 89 MMOL/L (ref 96–108)
CO2 SERPL-SCNC: 41 MMOL/L (ref 21–32)
CREAT SERPL-MCNC: 0.91 MG/DL (ref 0.6–1.3)
EOSINOPHIL # BLD MANUAL: 0 THOUSAND/UL (ref 0–0.4)
EOSINOPHIL NFR BLD MANUAL: 0 % (ref 0–6)
ERYTHROCYTE [DISTWIDTH] IN BLOOD BY AUTOMATED COUNT: 12.4 % (ref 11.6–15.1)
GFR SERPL CREATININE-BSD FRML MDRD: 87 ML/MIN/1.73SQ M
GLUCOSE SERPL-MCNC: 170 MG/DL (ref 65–140)
HCT VFR BLD AUTO: 30.7 % (ref 36.5–49.3)
HGB BLD-MCNC: 9.8 G/DL (ref 12–17)
HYPERCHROMIA BLD QL SMEAR: PRESENT
LYMPHOCYTES # BLD AUTO: 0.46 THOUSAND/UL (ref 0.6–4.47)
LYMPHOCYTES # BLD AUTO: 4 % (ref 14–44)
MCH RBC QN AUTO: 31.6 PG (ref 26.8–34.3)
MCHC RBC AUTO-ENTMCNC: 31.9 G/DL (ref 31.4–37.4)
MCV RBC AUTO: 99 FL (ref 82–98)
MONOCYTES # BLD AUTO: 0.46 THOUSAND/UL (ref 0–1.22)
MONOCYTES NFR BLD: 4 % (ref 4–12)
NEUTROPHILS # BLD MANUAL: 10.47 THOUSAND/UL (ref 1.85–7.62)
NEUTS BAND NFR BLD MANUAL: 14 % (ref 0–8)
NEUTS SEG NFR BLD AUTO: 77 % (ref 43–75)
PLATELET # BLD AUTO: 294 THOUSANDS/UL (ref 149–390)
PLATELET BLD QL SMEAR: ADEQUATE
PMV BLD AUTO: 9.9 FL (ref 8.9–12.7)
POTASSIUM SERPL-SCNC: 4.3 MMOL/L (ref 3.5–5.3)
RBC # BLD AUTO: 3.1 MILLION/UL (ref 3.88–5.62)
RBC MORPH BLD: PRESENT
SODIUM SERPL-SCNC: 132 MMOL/L (ref 135–147)
VARIANT LYMPHS # BLD AUTO: 1 %
WBC # BLD AUTO: 11.5 THOUSAND/UL (ref 4.31–10.16)

## 2023-06-12 PROCEDURE — 80048 BASIC METABOLIC PNL TOTAL CA: CPT | Performed by: INTERNAL MEDICINE

## 2023-06-12 PROCEDURE — 85027 COMPLETE CBC AUTOMATED: CPT | Performed by: NURSE PRACTITIONER

## 2023-06-12 PROCEDURE — 97530 THERAPEUTIC ACTIVITIES: CPT

## 2023-06-12 PROCEDURE — 94760 N-INVAS EAR/PLS OXIMETRY 1: CPT

## 2023-06-12 PROCEDURE — 99232 SBSQ HOSP IP/OBS MODERATE 35: CPT | Performed by: INTERNAL MEDICINE

## 2023-06-12 PROCEDURE — 94664 DEMO&/EVAL PT USE INHALER: CPT

## 2023-06-12 PROCEDURE — 94640 AIRWAY INHALATION TREATMENT: CPT

## 2023-06-12 PROCEDURE — 94660 CPAP INITIATION&MGMT: CPT

## 2023-06-12 PROCEDURE — 97116 GAIT TRAINING THERAPY: CPT

## 2023-06-12 PROCEDURE — 97535 SELF CARE MNGMENT TRAINING: CPT

## 2023-06-12 PROCEDURE — 85007 BL SMEAR W/DIFF WBC COUNT: CPT | Performed by: NURSE PRACTITIONER

## 2023-06-12 PROCEDURE — 99239 HOSP IP/OBS DSCHRG MGMT >30: CPT | Performed by: NURSE PRACTITIONER

## 2023-06-12 RX ORDER — ECHINACEA PURPUREA EXTRACT 125 MG
1 TABLET ORAL
Qty: 30 ML | Refills: 0 | Status: SHIPPED | OUTPATIENT
Start: 2023-06-12

## 2023-06-12 RX ORDER — ALBUTEROL SULFATE 90 UG/1
2 AEROSOL, METERED RESPIRATORY (INHALATION) EVERY 6 HOURS PRN
Qty: 8.5 G | Refills: 2 | Status: SHIPPED | OUTPATIENT
Start: 2023-06-12

## 2023-06-12 RX ORDER — HYDROXYZINE HYDROCHLORIDE 25 MG/1
25 TABLET, FILM COATED ORAL EVERY 6 HOURS PRN
Qty: 30 TABLET | Refills: 0 | Status: SHIPPED | OUTPATIENT
Start: 2023-06-12

## 2023-06-12 RX ORDER — PREDNISONE 20 MG/1
40 TABLET ORAL DAILY
Status: DISCONTINUED | OUTPATIENT
Start: 2023-06-13 | End: 2023-06-12 | Stop reason: HOSPADM

## 2023-06-12 RX ORDER — CHLORHEXIDINE GLUCONATE 0.12 MG/ML
15 RINSE ORAL EVERY 12 HOURS SCHEDULED
Qty: 120 ML | Refills: 0 | Status: SHIPPED | OUTPATIENT
Start: 2023-06-12

## 2023-06-12 RX ORDER — PREDNISONE 10 MG/1
40 TABLET ORAL DAILY
Qty: 50 TABLET | Refills: 0 | Status: SHIPPED | OUTPATIENT
Start: 2023-06-13

## 2023-06-12 RX ADMIN — IPRATROPIUM BROMIDE 0.5 MG: 0.5 SOLUTION RESPIRATORY (INHALATION) at 07:39

## 2023-06-12 RX ADMIN — FORMOTEROL FUMARATE DIHYDRATE 20 MCG: 20 SOLUTION RESPIRATORY (INHALATION) at 07:39

## 2023-06-12 RX ADMIN — LEVALBUTEROL HYDROCHLORIDE 1.25 MG: 1.25 SOLUTION RESPIRATORY (INHALATION) at 13:30

## 2023-06-12 RX ADMIN — TORSEMIDE 20 MG: 20 TABLET ORAL at 09:35

## 2023-06-12 RX ADMIN — METHYLPREDNISOLONE SODIUM SUCCINATE 40 MG: 40 INJECTION, POWDER, FOR SOLUTION INTRAMUSCULAR; INTRAVENOUS at 09:36

## 2023-06-12 RX ADMIN — CYANOCOBALAMIN TAB 500 MCG 1000 MCG: 500 TAB at 09:39

## 2023-06-12 RX ADMIN — IPRATROPIUM BROMIDE 0.5 MG: 0.5 SOLUTION RESPIRATORY (INHALATION) at 13:30

## 2023-06-12 RX ADMIN — ASPIRIN 81 MG: 81 TABLET, COATED ORAL at 09:35

## 2023-06-12 RX ADMIN — LEVALBUTEROL HYDROCHLORIDE 1.25 MG: 1.25 SOLUTION RESPIRATORY (INHALATION) at 07:39

## 2023-06-12 RX ADMIN — CHLORHEXIDINE GLUCONATE 15 ML: 1.2 SOLUTION ORAL at 09:35

## 2023-06-12 RX ADMIN — BUDESONIDE 0.5 MG: 0.5 INHALANT ORAL at 07:39

## 2023-06-12 RX ADMIN — DOCUSATE SODIUM 100 MG: 100 CAPSULE, LIQUID FILLED ORAL at 09:40

## 2023-06-12 RX ADMIN — GUAIFENESIN 1200 MG: 600 TABLET, EXTENDED RELEASE ORAL at 09:35

## 2023-06-12 RX ADMIN — ENOXAPARIN SODIUM 40 MG: 40 INJECTION SUBCUTANEOUS at 09:35

## 2023-06-12 NOTE — TELEPHONE ENCOUNTER
----- Message from Roopa June PA-C sent at 6/12/2023 10:42 AM EDT -----  Patient discharged from B    Please call to schedule hospital follow-up and repeat BMP in 1 week

## 2023-06-12 NOTE — OCCUPATIONAL THERAPY NOTE
Occupational Therapy Progress Note     Patient Name: Doroteo Quinteros  Today's Date: 6/12/2023  Problem List  Principal Problem:    Chronic obstructive pulmonary disease with acute exacerbation (HCC)  Active Problems:    KEVIN and COPD overlap syndrome (HCC)    Osteoporosis    Chronic anemia    Hyponatremia    Acute on chronic systolic congestive heart failure (HCC)    Pneumonia    Severe protein-calorie malnutrition ([facility] )    Alkalosis            06/12/23 1111   OT Last Visit   OT Visit Date 06/12/23   Note Type   Note Type Treatment   Pain Assessment   Pain Assessment Tool 0-10   Pain Score No Pain   Restrictions/Precautions   Weight Bearing Precautions Per Order No   Other Precautions O2;Fall Risk;Telemetry;Multiple lines  (3 L NC)   ADL   LB Dressing Assistance 3  Moderate Assistance   LB Dressing Deficit Don/doff R sock; Don/doff L sock   Toileting Assistance  4  Minimal Assistance   Toileting Deficit Steadying; Increased time to complete;Clothing management up  (standing with urinal)   Transfers   Sit to Stand 4  Minimal assistance   Additional items Assist x 1; Increased time required   Stand to Sit 4  Minimal assistance   Additional items Assist x 1; Increased time required   Additional Comments RW   Functional Mobility   Functional Mobility 4  Minimal assistance   Additional Comments bed to bedside recliner, limited by SOB, fatigue   Additional items Rolling walker   Cognition   Overall Cognitive Status [facility]   Arousal/Participation Alert   Attention Attends with cues to redirect   Orientation Level Oriented X4   Memory Decreased recall of precautions   Following Commands Follows one step commands without difficulty   Activity Tolerance   Activity Tolerance Patient tolerated treatment well   Medical Staff Made Aware PT, RN   Assessment   Assessment Patient participated in Skilled OT session this date with interventions consisting of ADL re-training, energy conservation, standing balance   Patient agreeable to OT treatment session, upon arrival patient was found standing at bedside with PT present  In comparison to previous session, patient with improvements in activity tolerance  Pt wit SpO2 between 88-92% on 3 L NC  Educated on deep breathing and seated rest breaks  Pt requiring assist to don socks - reports he sometimes has difficulty with this activity d/t numbness in fingertips  Denies numbness/tingling in feet  Pt requiring MIN A to use urinal in standing position for steadying and clothing management  Patient continues to be functioning below baseline level, occupational performance remains limited secondary to factors listed above and increased risk for falls and injury  From OT standpoint, recommendation at time of d/c would be POST-ACUTE REHAB vs  Home OT pending progress  Patient to benefit from continued Occupational Therapy treatment while in the hospital to address deficits as defined above and maximize level of functional independence with ADLs and functional mobility  Plan   Treatment Interventions ADL retraining;Functional transfer training;Patient/family training;Equipment evaluation/education; Compensatory technique education; Energy conservation; Activityengagement   Goal Expiration Date 06/19/23   OT Treatment Day 1   OT Frequency 2-3x/wk   Recommendation   OT Discharge Recommendation Post acute rehabilitation services   AM-PAC Daily Activity Inpatient   Lower Body Dressing 3   Bathing 3   Toileting 3   Upper Body Dressing 3   Grooming 4   Eating 4   Daily Activity Raw Score 20   Daily Activity Standardized Score (Calc for Raw Score >=11) 42 03   AM-PAC Applied Cognition Inpatient   Following a Speech/Presentation 4   Understanding Ordinary Conversation 4   Taking Medications 4   Remembering Where Things Are Placed or Put Away 4   Remembering List of 4-5 Errands 4   Taking Care of Complicated Tasks 3   Applied Cognition Raw Score 23   Applied Cognition Standardized Score 53 08   End of Consult Patient Position at End of Consult Bedside chair;Bed/Chair alarm activated; All needs within reach   Nurse Communication Nurse aware of consult         The patient's raw score on the AM-PAC Daily Activity Inpatient Short Form is 20  A raw score of greater than or equal to 19 suggests the patient may benefit from discharge to home  Please refer to the recommendation of the Occupational Therapist for safe discharge planning            Diane Hsu OTR/L

## 2023-06-12 NOTE — PROGRESS NOTES
PULMONOLOGY PROGRESS NOTE     Name: Doroteo Quinteros  Age & Sex: 79 y o  male   MRN: [id number]  Unit/Bed#: -01   Encounter: [id number]    PATIENT INFORMATION     Name: Doroteo Quinteros  Age & Sex: 79 y o  male   MRN: [id number]  Hospital Stay Days: 11    ASSESSMENT/PLAN     Assessment:   1  Acute on chronic respiratory failure with hypoxia and hypercapnia  2  Acute on chronic heart failure with reduced ejection fraction  3  Acute exacerbation of COPD  4  Very severe COPD, end-stage  5  KEVIN/COPD overlap  6  Concern for pneumonia    Plan:  • Continue supplemental oxygen to maintain SPO2 greater than 88%  Able to wean patient back down to baseline of 3L NC O2 and tolerating  • Continue BiPAP at night due to likely KEVIN/COPD with severe risk for hypercapnia and respiratory failure  • Continue bronchodilators with Xopenex/Atrovent and Perforomist/Pulmicort     • Continue prednisone taper  • Completed doxycycline course for tracheobronchitis   • Continue diuresis as likely his heart failure in combination with end stage COPD is likely causing his acute presentation  • He was started on broad-spectrum antibiotics on 6/3 due to elevated drip score   Procalcitonin has been grossly unremarkable   MRSA nares culture positive   Currently is on doxycycline  Blood cultures are negative 5 days   Recommend discontinuation of vancomycin and cefepime   Can continue doxycycline for 5-day course for tracheobronchitis  • Multiple discussions previously with hospice/palliative care  Patient seems to be uncertain with his degree of acceptance of his current conditions and likelihood of meaningful recovery  Palliative care input is greatly appreciated  Palliative team has been consulted and after [address], wife andpatient want to continue full care  Was able to wean patient from high flow to 3L NC O2, which is his baseline  Able to stay at baseline    • Will need follow up with outpatient pulm provider, Dr Jeromy Gamez on discharge since patient follows up with her outpatient  SUBJECTIVE     Patient seen and examined, laying in bed  Awake and alert  Wife at bedside on exam  No acute events overnight  Patient denies any fevers, chills, CP, N+V, abdominal pain  Does not have any worsening SOB  At his baseline of 3L NC O2 saturating appropriately  States that he wants to go home after being in the hospital for a while  Wife and patient after discussions with palliative care last week, wish to continue full care  However, they understand that given his end stage COPD, high likelihood of coming back to the hospital/exacerbation due to severity of COPD in addition to heart failure  OBJECTIVE     Vitals:    23 0435 23 0546 23 0728 23 0740   BP:   133/75    BP Location:   Left arm    Pulse:   76 76   Resp:   18    Temp:       TempSrc:       SpO2: 96%  100% 98%   Weight:  47 8 kg (105 lb 4 8 oz)     Height:          Temperature:   Temp (24hrs), Av 3 °F (36 8 °C), Min:97 8 °F (36 6 °C), Max:98 6 °F (37 °C)    Temperature: 98 6 °F (37 °C)  Intake & Output:  I/O       06/10 0701   0700  0701   0700  0701   0700    P  O  1078 720 120    Total Intake(mL/kg) 1078 (23 5) 720 (15 1) 120 (2 5)    Urine (mL/kg/hr) 1059 (1) 975 (0 8) 875 (4 7)    Total Output 1059 975 875    Net +19 -255 -755           Unmeasured Urine Occurrence 1 x          Weights:   IBW (Ideal Body Weight): 50 kg    Body mass index is 20 56 kg/m²  Weight (last 2 days)     Date/Time Weight    23 0546 47 8 (105 3)    23 0600 45 8 (101)        Physical Exam  Vitals reviewed  Constitutional:       General: He is not in acute distress  Appearance: He is cachectic  He is ill-appearing  Eyes:      General:         Right eye: No discharge  Left eye: No discharge  Conjunctiva/sclera: Conjunctivae normal    Cardiovascular:      Rate and Rhythm: Normal rate and regular rhythm        Pulses: Normal pulses  Heart sounds: Normal heart sounds  No murmur heard  No gallop  Pulmonary:      Effort: Tachypnea present  No respiratory distress  Breath sounds: Decreased air movement present  Wheezing (minimal wheezing bilaterally) present  Comments: Decreased breath sounds bilaterally  On 3L NC O2, saturating appropriately  Abdominal:      General: Bowel sounds are normal  There is no distension  Palpations: Abdomen is soft  Musculoskeletal:      Right lower leg: No edema  Left lower leg: No edema  Skin:     General: Skin is warm and dry  Neurological:      Mental Status: He is alert  Psychiatric:         Mood and Affect: Mood normal          Behavior: Behavior normal  Behavior is cooperative  LABORATORY DATA     Labs: I have personally reviewed pertinent reports  Results from last 7 days   Lab Units 06/12/23  0510 06/09/23  0627 06/08/23  0530   EOS PCT % 0 0  --    HEMATOCRIT % 30 7* 31 6* 33 0*   HEMOGLOBIN g/dL 9 8* 9 7* 9 9*   MONOS PCT %  --  10  --    MONO PCT % 4  --   --    NEUTROS PCT %  --  82*  --    PLATELETS Thousands/uL 294 210 200   WBC Thousand/uL 11 50* 11 41* 9 72      Results from last 7 days   Lab Units 06/12/23  0510 06/11/23  0753 06/10/23  2155   BUN mg/dL 35* 25 27*   CALCIUM mg/dL 9 2 9 1 8 6   CHLORIDE mmol/L 89* 87* 83*   CO2 mmol/L 41* >45* 43*   CREATININE mg/dL 0 91 0 75 1 31*   POTASSIUM mmol/L 4 3 4 1 4 2                              ABG:       Micro:         IMAGING & DIAGNOSTIC TESTING     Radiology Results: I have personally reviewed pertinent reports  XR foot 3+ vw right    Result Date: 6/6/2023  Impression: No acute osseous abnormality  Workstation performed: [id number]     XR chest 1 view portable    Result Date: 6/2/2023  Impression: 1  Mild right basilar airspace opacity concerning for pneumonia  2   Background emphysematous changes  Interpretation concordant with preliminary findings from the emergency department   Workstation "performed: [id number]     Other Diagnostic Testing: I have personally reviewed pertinent reports  ACTIVE MEDICATIONS     Current Facility-Administered Medications   Medication Dose Route Frequency   • acetaminophen (TYLENOL) tablet 975 mg  975 mg Oral Q8H PRN   • albuterol inhalation solution 2 5 mg  2 5 mg Nebulization Q4H PRN   • aspirin (ECOTRIN LOW STRENGTH) EC tablet 81 mg  81 mg Oral Daily   • atorvastatin (LIPITOR) tablet 40 mg  40 mg Oral Daily With Dinner   • budesonide (PULMICORT) inhalation solution 0 5 mg  0 5 mg Nebulization Daily   • chlorhexidine (PERIDEX) 0 12 % oral rinse 15 mL  15 mL Mouth/Throat Q12H GABE   • cyanocobalamin (VITAMIN B-12) tablet 1,000 mcg  1,000 mcg Oral Daily   • docusate sodium (COLACE) capsule 100 mg  100 mg Oral BID   • docusate sodium (COLACE) capsule 100 mg  100 mg Oral BID   • enoxaparin (LOVENOX) subcutaneous injection 40 mg  40 mg Subcutaneous Daily   • formoterol (PERFOROMIST) nebulizer solution 20 mcg  20 mcg Nebulization Q12H   • guaiFENesin (MUCINEX) 12 hr tablet 1,200 mg  1,200 mg Oral Q12H GABE   • hydrOXYzine HCL (ATARAX) tablet 25 mg  25 mg Oral Q6H PRN   • ipratropium (ATROVENT) 0 02 % inhalation solution 0 5 mg  0 5 mg Nebulization TID   • levalbuterol (XOPENEX) inhalation solution 1 25 mg  1 25 mg Nebulization TID   • melatonin tablet 3 mg  3 mg Oral HS   • predniSONE tablet 40 mg  40 mg Oral Daily   • sodium chloride (OCEAN) 0 65 % nasal spray 1 spray  1 spray Each Nare Q1H PRN   • tamsulosin (FLOMAX) capsule 0 4 mg  0 4 mg Oral Daily With Dinner   • torsemide (DEMADEX) tablet 20 mg  20 mg Oral Daily         Disclaimer: Portions of the record may have been created with voice recognition software  Occasional wrong word or \"sound a like\" substitutions may have occurred due to the inherent limitations of voice recognition software  Careful consideration should be taken to recognize, using context, where substitutions have occurred      Mal King" MD   PGY-1, [facility]

## 2023-06-12 NOTE — PHYSICAL THERAPY NOTE
PHYSICAL THERAPY NOTE          Patient Name: Britton Brunson  Today's Date: 6/12/2023 06/12/23 1112   PT Last Visit   PT Visit Date 06/12/23   Note Type   Note Type Treatment   Pain Assessment   Pain Assessment Tool 0-10   Pain Score No Pain   Restrictions/Precautions   Weight Bearing Precautions Per Order No   Other Precautions O2;Fall Risk;Pain;Telemetry  (O2 3L via NC)   General   Chart Reviewed Yes   Family/Caregiver Present Yes   Cognition   Overall Cognitive Status WFL   Arousal/Participation Responsive; Cooperative   Attention Attends with cues to redirect   Orientation Level Oriented X4   Memory Decreased recall of precautions   Following Commands Follows one step commands without difficulty   Comments Patient cooperative  Patient agitated at inability to go home, but is able to be encouraged to participate  Subjective   Subjective I just want to go home  Bed Mobility   Supine to Sit 5  Supervision   Additional items HOB elevated   Sit to Supine Unable to assess   Additional Comments Patient in bed on arrival, but is left in chair following activity   Transfers   Sit to Stand   (CGA)   Additional items Assist x 1; Increased time required; Impulsive   Stand to Sit   (CGA)   Additional items Assist x 1; Increased time required;Verbal cues   Additional Comments at [facility] [place]   Ambulation/Elevation   Gait pattern Decreased foot clearance; Forward Flexion; Short stride   Gait Assistance 4  Minimal assist   Additional items Assist x 1;Verbal cues   Assistive Device Rolling walker   Distance 5' to chair   Balance   Static Sitting Fair   Dynamic Sitting Fair -   Static Standing Poor +   Dynamic Standing Poor +   Ambulatory Poor +   Endurance Deficit   Endurance Deficit Yes   Endurance Deficit Description Desats with activity ; O2 fluctuates between 85-95% on 3L via NC throughout session   Activity Tolerance   Activity Tolerance Patient limited by fatigue   Medical Staff Made Aware OT Joseluis   Nurse Made Aware RN cleared patient for therapy   Assessment   Prognosis Fair   Problem List Decreased strength;Decreased range of motion;Decreased endurance; Impaired balance;Decreased mobility; Decreased coordination;Decreased cognition;Decreased safety awareness;Pain   Assessment Patient received in bed  Patient agreeable to therapy  Increased time needed throughout session due to patient deconditioning and need for rest periods  Patient performs bed mobility with Supervision, but requires increased time to complete  Patient performs functional [place] with [place] [facility] [place] STS transfers performed throughout session  Patient performs ambulation with Min A using RW, 3' to chair  Patient defers further ambulation due to fatigue  Patient O2 fluctuates between 85-95% on 3L via NC throughout session  O2 sat improves with seated rest   Patient left in bedside chair with all needs met and call bell/personal items within reach  The patient's AM-PAC Basic Mobility Inpatient Short Form Raw Score is 15 showing further need for skilled PT services in order to improve functional mobility, decrease need for assistance, and return to PLOF  A Raw score of less than 16 suggests the patient may benefit from discharge to post-acute rehabilitation services  PT continues to recommend rehab on d/c  Will continue to follow as able  Goals   Patient Goals to have more energy   PT Treatment Day 1   Plan   Treatment/Interventions ADL retraining;Functional transfer training;LE strengthening/ROM; Therapeutic exercise; Endurance training;Elevations; Patient/family training;Equipment eval/education; Bed mobility;Gait training;Spoke to nursing;Spoke to case management;OT   Progress Slow progress, decreased activity tolerance   PT Frequency 2-3x/wk   Recommendation   PT Discharge Recommendation Post acute rehabilitation services  (versus home with HHPT - pending progress)   Equipment Recommended [employer] Package Recommended Wheeled walker   AM-PAC Basic Mobility Inpatient   Turning in [facility] Without Bedrails 4   Lying on Back to Sitting on Edge of Flat Bed Without Bedrails 4   Moving Bed to Chair 2   Standing Up From Chair Using Arms 2   Walk in Room 2   Climb 3-5 Stairs With Railing 1   Basic Mobility Inpatient Raw Score 15   Basic Mobility Standardized Score 36 97   Highest Level Of Mobility   JH-HLM Goal 4: Move to chair/commode   JH-HLM Achieved 4: Move to chair/commode   End of Consult   Patient Position at End of Consult Bedside chair; All needs within reach;Bed/Chair alarm activated     Papo Ser, PT, DPT

## 2023-06-12 NOTE — PLAN OF CARE
Problem: OCCUPATIONAL THERAPY ADULT  Goal: Performs self-care activities at highest level of function for planned discharge setting  See evaluation for individualized goals  Description: Treatment Interventions: ADL retraining, Functional transfer training, UE strengthening/ROM, Endurance training, Patient/family training, Equipment evaluation/education, Compensatory technique education, Continued evaluation, Energy conservation, Activityengagement          See flowsheet documentation for full assessment, interventions and recommendations  Outcome: Progressing  Note: Limitation: Decreased ADL status, Decreased endurance, Decreased self-care trans, Decreased high-level ADLs  Prognosis: Fair  Assessment: Patient participated in Skilled OT session this date with interventions consisting of ADL re-training, energy conservation, standing balance  Patient agreeable to OT treatment session, upon arrival patient was found standing at bedside with PT present  In comparison to previous session, patient with improvements in activity tolerance  Pt wit SpO2 between 88-92% on 3 L NC  Educated on deep breathing and seated rest breaks  Pt requiring assist to don socks - reports he sometimes has difficulty with this activity d/t numbness in fingertips  Denies numbness/tingling in feet  Pt requiring MIN A to use urinal in standing position for steadying and clothing management  Patient continues to be functioning below baseline level, occupational performance remains limited secondary to factors listed above and increased risk for falls and injury  From OT standpoint, recommendation at time of d/c would be POST-ACUTE REHAB vs  Home OT pending progress  Patient to benefit from continued Occupational Therapy treatment while in the hospital to address deficits as defined above and maximize level of functional independence with ADLs and functional mobility       OT Discharge Recommendation: Post acute rehabilitation services

## 2023-06-12 NOTE — PROGRESS NOTES
NEPHROLOGY PROGRESS NOTE   Tracy Seth  79 y o  male MRN: [id number]  Unit/Bed#: -01 Encounter: [id number]      ASSESSMENT/PLAN:  1  Acute on chronic hyponatremia: Due to CHF and possible 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 cancer  · Sodium 132 today which is stable  · Previously receiving tolvaptan, last dose 6/10  · Continue torsemide 20 mg daily and oral fluid restriction   2  Acute on chronic hypoxic respiratory failure: Due to COPD, pneumonia exacerbation possible CHF  · Pulmonology and palliative care on board  3  CHF with ejection fraction 20%: Currently appears euvolemic  Continue torsemide 20 mg daily  4  Anemia: Hemoglobin stable at 9 8 and would continue to monitor    Plan Summary:   • Okay to discharge from nephrology standpoint  • We will continue torsemide 20 mg daily and oral fluid restriction at home   • Message sent to office for repeat BMP in 1 week and hospital follow up appointment  • Discussed above plan with primary team who agree with the plan    SUBJECTIVE:  Feeling okay and hoping to go home today  He is currently on 3 L nasal cannula which is his baseline and he denies any shortness of breath      OBJECTIVE:  Current Weight: Weight - Scale: 47 8 kg (105 lb 4 8 oz)  Vitals:    06/12/23 0740   BP:    Pulse: 76   Resp:    Temp:    SpO2: 98%       Intake/Output Summary (Last 24 hours) at 6/12/2023 1036  Last data filed at 6/12/2023 1028  Gross per 24 hour   Intake 480 ml   Output 1625 ml   Net -1145 ml       General:  appears comfortable and in no acute distress   Skin:  No rash  Eyes:  Sclerae anicteric, no periorbital edema   ENT:  Moist mucous membranes  Neck:  Trachea midline, symmetric   Chest:  Coarse breath sounds   CVS:  Regular rate and rhythm  Abdomen:  Soft, nontender, nondistended  Neuro:  Awake and alert  Psych:  Appropriate affect  Extremities: no lower extremity edema     Medications:  Scheduled Meds:  Current Facility-Administered Medications   Medication Dose Route Frequency Provider Last Rate   • acetaminophen  975 mg Oral Q8H PRN Cottie Fast, PA-C     • albuterol  2 5 mg Nebulization Q4H PRN Cottie Fast, PA-C     • aspirin  81 mg Oral Daily Cottie Fast, PA-C     • atorvastatin  40 mg Oral Daily With [address] , PA-C     • budesonide  0 5 mg Nebulization Daily Cottie Fast, PA-C     • chlorhexidine  15 mL Mouth/Throat Q12H [address] Cottie Fast, PA-C     • cyanocobalamin  1,000 mcg Oral Daily Cottie Fast, PA-C     • docusate sodium  100 mg Oral BID Cottie Fast, PA-C     • docusate sodium  100 mg Oral BID Cottie Fast, PA-C     • enoxaparin  40 mg Subcutaneous Daily Cottie Fast, PA-C     • formoterol  20 mcg Nebulization Q12H Cottie Fast, PA-C     • guaiFENesin  1,200 mg Oral Q12H [address] Cottie Fast, PA-C     • hydrOXYzine HCL  25 mg Oral Q6H PRN Cottie Fast, PA-C     • ipratropium  0 5 mg Nebulization TID Cottie Fast, PA-C     • levalbuterol  1 25 mg Nebulization TID Cottie Fast, PA-C     • melatonin  3 mg Oral HS Cottie Fast, PA-C     • methylPREDNISolone sodium succinate  40 mg Intravenous Q12H [address] Cottie Fast, PA-C     • sodium chloride  1 spray Each Nare Q1H PRN Cottie Fast, PA-C     • tamsulosin  0 4 mg Oral Daily With Dinner Cottie Fast, PA-C     • torsemide  20 mg Oral Daily Cottie Fast, PA-C         PRN Meds: •  acetaminophen  •  albuterol  •  hydrOXYzine HCL  •  sodium chloride    Laboratory Results:  Results from last 7 days   Lab Units 06/12/23  0510 06/11/23  0753 06/10/23  2155 06/10/23  0950 06/09/23  2052 06/09/23  5899 06/09/23  0627 06/08/23  0852 06/08/23  0530   BUN mg/dL 35* 25 27* 22 27* 27* 27*   < >  --    CALCIUM mg/dL 9 2 9 1 8 6 8 7 8 4 8 8 8 4   < >  --    CHLORIDE mmol/L 89* 87* 83* 87* 84* 86* 87*   < >  --    CO2 mmol/L 41* >45* 43* 45* 43* >45* >45*   < >  --    CREATININE mg/dL 0 91 0 75 1 31* 0 75 0 89 0 75 0 74   < >  --    HEMATOCRIT % 30 7*  -- --   --   --   --  31 6*  --  33 0*   HEMOGLOBIN g/dL 9 8*  --   --   --   --   --  9 7*  --  9 9*   POTASSIUM mmol/L 4 3 4 1 4 2 3 4* 3 7 3 7 3 6   < >  --    PLATELETS Thousands/uL 294  --   --   --   --   --  210  --  200   SODIUM mmol/L 132* 131* 130* 133* 127* 131* 132*   < >  --    WBC Thousand/uL 11 50*  --   --   --   --   --  11 41*  --  9 72    < > = values in this interval not displayed

## 2023-06-12 NOTE — PLAN OF CARE
Problem: PHYSICAL THERAPY ADULT  Goal: Performs mobility at highest level of function for planned discharge setting  See evaluation for individualized goals  Description: Treatment/Interventions: Functional transfer training, LE strengthening/ROM, Therapeutic exercise, Endurance training, Elevations, Patient/family training, Equipment eval/education, Bed mobility, Gait training, OT, Spoke to nursing  Equipment Recommended:  (will cont to assess)       See flowsheet documentation for full assessment, interventions and recommendations  Outcome: Progressing  Note: Prognosis: Fair  Problem List: Decreased strength, Decreased range of motion, Decreased endurance, Impaired balance, Decreased mobility, Decreased coordination, Decreased cognition, Decreased safety awareness, Pain  Assessment: Patient received in bed  Patient agreeable to therapy  Increased time needed throughout session due to patient deconditioning and need for rest periods  Patient performs bed mobility with Supervision, but requires increased time to complete  Patient performs functional transfers with CGA at [facility] [place]  x3 STS transfers performed throughout session  Patient performs ambulation with Min A using RW, 3' to chair  Patient defers further ambulation due to fatigue  Patient O2 fluctuates between 85-95% on 3L via NC throughout session  O2 sat improves with seated rest   Patient left in bedside chair with all needs met and call bell/personal items within reach  The patient's AM-PAC Basic Mobility Inpatient Short Form Raw Score is 15 showing further need for skilled PT services in order to improve functional mobility, decrease need for assistance, and return to PLOF  A Raw score of less than 16 suggests the patient may benefit from discharge to post-acute rehabilitation services  PT continues to recommend rehab on d/c  Will continue to follow as able          PT Discharge Recommendation: Post acute rehabilitation services (versus home with HHPT - pending progress)    See flowsheet documentation for full assessment

## 2023-06-12 NOTE — PROGRESS NOTES
Order for d/c received  AVS reviewed, all questions answered  IV site removed  Pt awaiting transport from wife at this time

## 2023-06-13 ENCOUNTER — HOME CARE VISIT (OUTPATIENT)
Dept: HOME HEALTH SERVICES | Facility: HOME HEALTHCARE | Age: 68
End: 2023-06-13

## 2023-06-13 PROBLEM — Z51.5 PALLIATIVE CARE PATIENT: Status: ACTIVE | Noted: 2023-06-13

## 2023-06-13 NOTE — PROGRESS NOTES
"Outpatient Follow-Up - Palliative and Supportive Care   Karlene Gan  79 y o  male [id number]    Assessment & Plan  Problem List Items Addressed This Visit        Respiratory    Chronic obstructive pulmonary disease with acute exacerbation ([facility] ) - Primary    Chronic respiratory failure with hypoxia and hypercapnia (HCC)    Pulmonary nodules/lesions, multiple       Cardiovascular and Mediastinum    Acute on chronic systolic congestive heart failure (HCC)       Genitourinary    Prostate cancer (HCC) (Chronic)       Other    Goals of care, counseling/discussion    Protein-calorie malnutrition ([facility] )    Palliative care patient     Assessment/Plan:  1) Symptom Management  • Patient declines any additional changes to medication regimen  Discussed use of benzodiazepines for anxiety associated with air hunger  • Acetaminophen 975 mg PO q8 hrs PRN mild-moderate pain  • Voltaren gel 2 grams four times daily  • Sennokot-S 8 6-50 mg qHS  • Colace 100 mg PO BID  • Atarax 25 mg PO q6 hrs PRN anxiety - patient has not yet used  • Encouraged tobacco cessation and educated about dangers of smoking with home oxygen  Patient and wife express understanding  • Provided ER precautions     2) Goals of Care  • Patient had been referred to hospice, but did not pursue as he wishes to continue medical optimization and return for future hospitalizations  • At today's visit, he reiterates \"I am not ready to go\" and \"I am still here for a reason  \" He states he will do whatever it takes to [facility] here  \"  • Discussed hospice in depth and patient does not feel it is the right time for hospice  • Discussed code status and patient would like to continue full code with CPR and intubation  He would not wish for long term life support including tracheostomy or PEG    • Patient has been given advanced directives, but has not yet completed  • Goals of care discussions will be ongoing     3) Social Support  • Patient well-supported by his wife Shreyas Covert " and son Marlyn Nagel  • Being at home as much as possible is of importance  • Emotional support provided     4) Follow-Up  • Patient will follow-up in 4 weeks or sooner if needed  Please call with any questions or concerns  • Encouraged patient to continue to follow with specialists and other providers as his goals are medical optimization as much as possible    Medications adjusted this encounter:  Requested Prescriptions      No prescriptions requested or ordered in this encounter     No orders of the defined types were placed in this encounter  There are no discontinued medications  Macey Sevilla  was seen today for symptoms and planning cares related to above illnesses  I have reviewed the patient's controlled substance dispensing history in the Prescription Drug Monitoring Program in compliance with the [facility] regulations before prescribing any controlled substances  No refills    They are invited to continue to follow with us  If there are questions or concerns, please contact us through our clinic/answering service 24 hours a day, seven days a week  Belem Sosa PA-C  [facility]iative [facility]ive [facility]        Visit Information    Accompanied By: Significant other, son Cherie Talavera of History: Self, Significant other, Medical record    History Limitations: None    Contact Information: Name: Brandon Her (Spouse): [phone]    Chief Complaint  No chief complaint on file  History of Present Illness      Macey Sevilla  is a 79 y o  male who presents in follow up of symptoms related to acute on chronic hypercapnic and hypoxic respiratory failure, end-stage COPD, heart failure with reduced EF  He was recently hospitalized from 4/30-5/10 and 6/1-6/12 due to acute exacerbation of COPD  He has had numerous exacerbations and hospitalizations over the past several months   During goals of care conversations during these hospitalizations, hospice has been introduced and patient "does not feel it is the right time for hospice and wants to continue current level of medical optimization including return to hospital if indicated  Pertinent issues include: symptom management, assessment of goals of care, advance care planning    Since last visit, patient has been hospitalized at [facility] for acute exacerbation of COPD from 6/1-6/12  During this admission, he wished for \"ongoing medical optimization aimed towards efforts of functional recovery as able, without limits  \"      Upon my encounter today, patient presents with his wife and son  He appears comfortable and in no acute distress, but does have increased work of breathing and appears chronically ill  O2 requirements of 4L to keep O2 saturation at 86%  He reports that he has been hospitalized since last visit and discusses events of hospitalization  He has been feeling shortness of breath at rest and while eating which makes it difficult to eat full meal  He has been eating low sodium diet with fluid restriction  He is encouraged to use ensure shakes to supplement meals within his dietary and fluid restrictions  He is able to use bathroom and walk short distances  He denies any other symptoms at this time including pain, nausea, vomiting, constipation, diarrhea  Reflected on previous goals of care discussions  Patient's wife had additional questions regarding hospice and what this would look like  Thoroughly explained the focus of hospice and that patient would not return to hospital for future hospitalizations  Explained this in setting of pneumonia that patient would receive medications for comfort, but would not return to hospital for diagnostics including blood work, chest x-ray or treatments including antibiotics, IVF  Patient and his wife do not feel it is the right time for hospice   Patient does wish to remain at home as much as possible and appointments are burdensome, however if his breathing were to worsen, he wishes to go to " hospital  He would like to remain full code and have trial of ventilation  He does state that he would not wish for long term life support including tracheostomy and PEG  Patient and his family are appropriately emotional, but appreciative of conversation  They will call with any additional questions or concerns  They will follow up in 1 month with virtual appointment  Past medical, surgical, social, and family histories are reviewed and pertinent updates are made  Review of Systems   All other systems reviewed and are negative  Vital Signs    /50 (BP Location: Right arm, Patient Position: Sitting, Cuff Size: Standard)   Pulse 90   Temp 97 6 °F (36 4 °C) (Temporal)   Wt 43 1 kg (95 lb)   SpO2 (!) 86% Comment: 4 l NC     76 %  3 L NC  BMI 18 55 kg/m²     Physical Exam and Objective Data  Physical Exam  Vitals and nursing note reviewed  Constitutional:       General: He is not in acute distress  Appearance: He is well-developed  Comments: Appears chronically ill  Thin   HENT:      Head: Normocephalic and atraumatic  Eyes:      Conjunctiva/sclera: Conjunctivae normal    Cardiovascular:      Rate and Rhythm: Normal rate  Pulmonary:      Effort: No respiratory distress  Comments: Increased effort  Abdominal:      Palpations: Abdomen is soft  Tenderness: There is no abdominal tenderness  Musculoskeletal:         General: No swelling  Cervical back: Neck supple  Skin:     General: Skin is warm and dry  Capillary Refill: Capillary refill takes less than 2 seconds  Neurological:      General: No focal deficit present  Mental Status: He is alert and oriented to person, place, and time     Psychiatric:         Mood and Affect: Mood normal          Behavior: Behavior normal        Radiology and Laboratory:  I personally reviewed and interpreted the following results: CBC and BMP 6/12/23    40 minutes was spent face to face with Jesus daniel than 50% of the time spent in counseling or coordination of care including discussions of diagnostic results, impression, and recommendations, risks and benefits of treatment, instructions for disease self management, treatment instructions, follow up requirements, risk factors and risk reduction of disease, patient and family counseling/involvement in care and compliance with treatment regimen  All of the patient's or agent's questions were answered during this discussion

## 2023-06-13 NOTE — DISCHARGE SUMMARY
[address] [facility]  Discharge- Ancil Comas  1955, 79 y o  male MRN: [id number]  Unit/Bed#: -01 Encounter: [id number]  Primary Care Provider: Cong Bullard DO   Date and time admitted to hospital: 6/1/2023 12:07 PM    * Chronic obstructive pulmonary disease with acute exacerbation ([facility] [address] )  Assessment & Plan    · Pt reports he smoked 2-3ppd for approx 45 years  · History of severe COPD with multiple admissions for COPD exacerbation  · PFT on 9/16/2020 showed FEV1/FVC 30%, FVC 59% predicted and FEV1 22% predicted  · Presented with respiratory distress requiring BiPAP  · was on high flow, now weaned to 3L NC O2 (baseline) during daytime and BiPAP at night  · Continue Pulmicort, formoterol, Atrovent, Xopenex  · Was on Solu-Medrol 40 mg every 12 hrs will transition to po prednisone tomorrow pt requesting to go home rather frustrated   · Pulmonology following  · Patient has end-stage COPD and has been seen by palliative care  He continues to want aggressive measures and to remain full code  · Downgrade to ms     Severe protein-calorie malnutrition ([facility] )  Assessment & Plan  Malnutrition Findings:   Adult Malnutrition type: Chronic illness  Adult Degree of Malnutrition: Other severe protein calorie malnutrition  Malnutrition Characteristics: Muscle loss, Fat loss                  360 Statement: severe malnutrition r/t chronic medical conditions as evidenced by Moderate-severe muscle loss around clavicles and temples, mild fat loss buccal fat pad, moderate fat loss orbital fat pad  Treatment: oral diet and oral nutrition supplements  BMI Findings: Body mass index is 20 56 kg/m²  Pneumonia  Assessment & Plan  · CXR (reviewed by me) 6/1/23: Mild right basilar airspace opacity concerning for pneumonia  Background emphysematous changes     · Recently on vancomycin/cefepime discontinued on 6/10/2023, now on Doxy (MRSA POS nares)  · -BCxs NGTD  · -pulm following  · -Speech therapy consulted, no change in diet recommended    Acute on chronic systolic congestive heart failure (HCC)  Assessment & Plan  Wt Readings from Last 3 Encounters:   06/12/23 47 8 kg (105 lb 4 8 oz)   06/01/23 46 7 kg (103 lb)   05/19/23 46 8 kg (103 lb 2 8 oz)     · Echo on 5/1/2023 - LVEF 20%, severe global hypokinesis, abnormal mildly septal motion consistent with left bundle branch block and diastolic flattening of interventricular septum consistent with right ventricule volume overload  ·   · S/p 2 doses of IV Lasix 40 mg  · Continue home torsemide 20 mg daily  · Daily weights and strict ins and outs  · Weight down   · Will give one dose of kcl         Hyponatremia  Assessment & Plan  · chronic hyponatremia due to chronic HFrEF and 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 CA  · Currently Na 131 , discussed with nephrology will hold Samsca today  · Appreciate nephrology  · Continue increased solute intake, fluid restriction, diuretics  · Am labs   · But sodium has been stable around 127-131  · If sodium level remains stable and able to wean off of steroid patient requesting to go home tomorrow  Chronic anemia  Assessment & Plan  · History of chronic anemia  · No active sign of bleeding  · Trend CBC  · cont home B12   · Transfuse if hemoglobin less than 7  Osteoporosis  Assessment & Plan  · DEXA scan on 2/11/2019 showed osteoporosis  · Not on any medications  Previously on bisphosphonate  · Follow-up outpatient with PCP      KEVIN and COPD overlap syndrome [facility])  Assessment & Plan  · Continue BiPAP at night          Medical Problems     Resolved Problems  Date Reviewed: 6/13/2023   None       Discharging Physician / Practitioner: Cathy Henao  PCP: Yocasta Linda DO  Admission Date:   Admission Orders (From admission, onward)     Ordered        06/01/23 1539  Inpatient Admission  Once                      Discharge Date: 06/12/23    Consultations During [facility]:  · Palliative care - Dr Edu Conley  · Nephrology Dr Siobhan Lindsey  · Podiatry - Dr Natasha Jefferson  · [address]    Procedures Performed:   · Chest xray 6/2: 1   Mild right basilar airspace opacity concerning for pneumonia  2   Background emphysematous changes  · Right foot xray 6/6: No acute osseous abnormality  · Chest xray 6/10: Emphysema with mild pulmonary venous congestion  · Mrsa culture positive for MRSA 6/2/23  · Blood cultures no growth final   · Day of discharge pts sodium 132 with glucose of 170  · Creatinine 0 91      Significant Findings / Test Results:   · See above     Incidental Findings:   · None     Test Results Pending at Discharge (will require follow up): · None      Outpatient Tests Requested:  · Call to schedule follow up appointment     Complications:  None     Reason for Admission: respiratory distress     Hospital Course:   Ashlyn Herrera  is a 79 y o  male patient who originally presented to the hospital on 6/1/2023 due to respiratory distress  Patient has past medical history of COPD [facility] [place] [facility] did use, KEVIN on CPAP at night, chronic HFrEF with EF of 20%, osteoporosis, prostate cancer status post TURP, GERD, anemia, hyperlipidemia  Patient presented from his heart failure office for increasing respiratory distress and confusion morning of admission  Patient was given 125 mg of Solu-Medrol, albuterol and Atrovent on route he was immediately placed on BiPAP on arrival blood pressure is well from 132/77 to 86/53 after 30 minutes  BNP was elevated and chest x-ray demonstrated bilateral congestion  Patient received 250 cc bolus of normal saline and a dose of IV antibiotic for possible pneumonia he was seen by Palliative care in the ED after further discussion it was confirmed that patient would remain level 1 full code patient was admitted intubated in February for COPD exacerbation and extubated at that time in 3 days    Patient's wife reported that patient had beginning of shortness of breath and difficulty ambulating in the morning of admission she noted he was having some nonspecific shaking as if he were having chills but did not complain of those symptoms to her  She also reported that she felt that his legs were very swollen  Patient was treated with IV Lasix breathing treatments and started on IV steroids  In conjunction with patient's admitting diagnosis he is noted to have chronic hyponatremia with his low output congestive heart failure and prostate cancer  Patient was treated with diuretics fluid restriction and monitored over the last few days  Patient's discharge sodium level was 133 patient stable  Extensive discussion had with patient's wife and patient in regards to fluid restriction and maintaining sodium levels  Patient is rather frustrated with his condition but again wants to be a level 1  Patient also noted during this admission to have early DTI bilateral heels seen by podiatry noting no open lesions to continue monitoring to elevate feet all for bed  Final plan of care is that patient will be discharged on 40 mg of prednisone with taper every 5 days with follow-up visit with pulmonary and per care physician to determine final dosing  Patient had completed his doxycycline course for tracheobronchitis  Recommendations to continue diuresis and maintain fluid restriction  Patient will need to call and schedule follow-up with Dr George Jaramillo on discharge and continue palliative follow-up in regards to goals of care  At this time patient is at what he feels to be his baseline and really requesting to return home  Discussion had with wife prescription sent for refill inhaler as they had lost her inhaler  Patient is to call and schedule follow-up with PCP, palliative, nephrology, pulmonary    Please see above list of diagnoses and related plan for additional information       Condition at Discharge: fair    Discharge Day Visit / Exam:   Subjective:  Pt is eager to go home feels like he is doing a lot better  He has no n/v/d chronic sob with exertion  On 2 - 3 liters wife will bring in his oxygen   Vitals: Blood Pressure: 132/78 (06/12/23 1115)  Pulse: 100 (06/12/23 1330)  Temperature: 98 8 °F (37 1 °C) (06/12/23 1115)  Temp Source: Oral (06/11/23 1558)  Respirations: 18 (06/12/23 0728)  Height: 5' (152 4 cm) (06/01/23 1620)  Weight - Scale: 47 8 kg (105 lb 4 8 oz) (06/12/23 0546)  SpO2: 93 % (06/12/23 1330)  Exam:   Physical Exam  Constitutional:       General: He is not in acute distress  Appearance: He is not ill-appearing or toxic-appearing  HENT:      Head: Normocephalic and atraumatic  Mouth/Throat:      Pharynx: No oropharyngeal exudate  Eyes:      General:         Right eye: No discharge  Left eye: No discharge  Conjunctiva/sclera: Conjunctivae normal    Cardiovascular:      Rate and Rhythm: Normal rate  Heart sounds: No murmur heard  No friction rub  No gallop  Pulmonary:      Effort: No respiratory distress  Breath sounds: No stridor  Rhonchi and rales present  No wheezing  Chest:      Chest wall: No tenderness  Abdominal:      General: There is no distension  Palpations: Abdomen is soft  There is no mass  Tenderness: There is no abdominal tenderness  There is no guarding or rebound  Musculoskeletal:         General: No swelling, tenderness, deformity or signs of injury  Right lower leg: No edema  Left lower leg: No edema  Comments: Kyphosis barrel chest    Skin:     Coloration: Skin is not jaundiced or pale  Findings: No bruising, erythema, lesion or rash  Neurological:      Mental Status: He is alert and oriented to person, place, and time  Psychiatric:         Behavior: Behavior normal           Discussion with Family: Updated  (wife) via phone  Discharge instructions/Information to patient and family:   See after visit summary for information provided to patient and family  Provisions for Follow-Up Care:  See after visit summary for information related to follow-up care and any pertinent home health orders  Disposition:   Home with VNA Services (Reminder: Complete face to face encounter)    Planned Readmission: No plan for readmission      Discharge Statement:  I spent 45 minutes discharging the patient  This time was spent on the day of discharge  I had direct contact with the patient on the day of discharge  Greater than 50% of the total time was spent examining patient, answering all patient questions, arranging and discussing plan of care with patient as well as directly providing post-discharge instructions  Additional time then spent on discharge activities  Discharge Medications:  See after visit summary for reconciled discharge medications provided to patient and/or family        **Please Note: This note may have been constructed using a voice recognition system**

## 2023-06-13 NOTE — ASSESSMENT & PLAN NOTE
Malnutrition Findings:   Adult Malnutrition type: Chronic illness  Adult Degree of Malnutrition: Other severe protein calorie malnutrition  Malnutrition Characteristics: Muscle loss, Fat loss                  360 Statement: severe malnutrition r/t chronic medical conditions as evidenced by Moderate-severe muscle loss around clavicles and temples, mild fat loss buccal fat pad, moderate fat loss orbital fat pad  Treatment: oral diet and oral nutrition supplements  BMI Findings: Body mass index is 20 56 kg/m²

## 2023-06-13 NOTE — ASSESSMENT & PLAN NOTE
Wt Readings from Last 3 Encounters:   06/12/23 47 8 kg (105 lb 4 8 oz)   06/01/23 46 7 kg (103 lb)   05/19/23 46 8 kg (103 lb 2 8 oz)     · Echo on 5/1/2023 - LVEF 20%, severe global hypokinesis, abnormal mildly septal motion consistent with left bundle branch block and diastolic flattening of interventricular septum consistent with right ventricule volume overload  ·   · S/p 2 doses of IV Lasix 40 mg  · Continue home torsemide 20 mg daily  · Daily weights and strict ins and outs    · Weight down   · Will give one dose of kcl

## 2023-06-13 NOTE — UTILIZATION REVIEW
NOTIFICATION OF ADMISSION DISCHARGE   This is a Notification of Discharge from [address]  Please be advised that this patient has been discharge from our facility  Below you will find the admission and discharge date and time including the patient’s disposition  UTILIZATION REVIEW CONTACT:  Oleg De Leon  Utilization   Network Utilization Review Department  Phone: [phone] x carefully listen to the prompts  All voicemails are confidential   Email: [email or web address]     ADMISSION INFORMATION  PRESENTATION DATE: 6/1/2023 12:07 PM  OBERVATION ADMISSION DATE:   INPATIENT ADMISSION DATE: 6/1/23  3:39 PM   DISCHARGE DATE: 6/12/2023  3:17 PM   DISPOSITION:Home with Home Health Care    IMPORTANT INFORMATION:  Send all requests for admission clinical reviews, approved or denied determinations and any other requests to dedicated fax number below belonging to the campus where the patient is receiving treatment   List of dedicated fax numbers:  [address] DENIALS (Administrative/Medical Necessity) [phone]   [address] (Maternity/NICU/Pediatrics) [phone]   [facility] [phone]   [place] [address] [phone]   [address] [phone]   220 [facility] [phone]   90 [facility] [phone]   [address] [place], [place] [address] [phone]   [facility]  [phone]   [address] [facility] [phone]   [facility] [address] [facility] [phone]

## 2023-06-13 NOTE — CASE COMMUNICATION
Notification of Assess not Admit    [facility]’s VNA has assessed your patient for Home Health services and has determined the patient is not eligible for service due to the following needs beyond home care  SN arrived to home and patient presented at kitchen table labored breathing just finished nebilizer treatment and oxygen status only at 72 % on 3L O2 BP was 96/60 hr 128 lungs bl wheezing  Per patient he doesnt want to go back to  hospital he wants to stay home with his family and animals but wants to be a full code  SN educated on safetly and risk of staying home with sats so low and vitals being unstable but patient refuses  SN per protocol had to call 911 and have patient evaluated  When EMS arrived patient allowed them to evaluate but per patient he didnt want to go to hospital  Patient not home health appropriate since he is refusing emergency care when his  health status is very unstable  Wife and patient educated on reason for home health not signing on care due to needs beyond what we can provide verbalized understanding       Madelin Tovar RN A

## 2023-06-14 ENCOUNTER — TELEPHONE (OUTPATIENT)
Dept: INTERNAL MEDICINE CLINIC | Facility: CLINIC | Age: 68
End: 2023-06-14

## 2023-06-14 ENCOUNTER — TRANSITIONAL CARE MANAGEMENT (OUTPATIENT)
Dept: INTERNAL MEDICINE CLINIC | Facility: CLINIC | Age: 68
End: 2023-06-14

## 2023-06-14 DIAGNOSIS — R73.01 IMPAIRED FASTING GLUCOSE: Primary | Chronic | ICD-10-CM

## 2023-06-14 DIAGNOSIS — J44.1 COPD EXACERBATION (HCC): Primary | ICD-10-CM

## 2023-06-14 DIAGNOSIS — J44.1 CHRONIC OBSTRUCTIVE PULMONARY DISEASE WITH ACUTE EXACERBATION (HCC): ICD-10-CM

## 2023-06-14 RX ORDER — IPRATROPIUM BROMIDE AND ALBUTEROL SULFATE 2.5; .5 MG/3ML; MG/3ML
3 SOLUTION RESPIRATORY (INHALATION) 4 TIMES DAILY
Qty: 360 ML | Refills: 11 | Status: SHIPPED | OUTPATIENT
Start: 2023-06-14 | End: 2023-07-14

## 2023-06-14 NOTE — TELEPHONE ENCOUNTER
Received a call from Bertrum Heimlich stating the appointment for Friday 6/16/23 will not work due to another appointment  Rescheduled patient for 7/19/23   Patient is on wait list

## 2023-06-14 NOTE — TELEPHONE ENCOUNTER
Pt was in hospital   He is Seeing dr David Chandler for a tcm on 6/21  But the wife is asking for a slip to have his BS checked as it was high in hospital and at discharge  Its been in the 300s  He is on steroids

## 2023-06-14 NOTE — TELEPHONE ENCOUNTER
Please let them know that an order has been placed at the [place] [id number] laboratory for a basic metabolic profile    He should fast for 8 hours the night before thank you

## 2023-06-14 NOTE — TELEPHONE ENCOUNTER
Spoke with Bere Martinez patients wife and scheduled hospital follow up for 6/16 with Dr Horace Dominguez in the [place]

## 2023-06-15 NOTE — TELEPHONE ENCOUNTER
LM for patient that first [facility]  Informed that first available in [place] is 8/31  Scheduled in 7/27 spot to hold for patient  He is to call back to let us know

## 2023-06-16 ENCOUNTER — OFFICE VISIT (OUTPATIENT)
Dept: PALLIATIVE MEDICINE | Facility: CLINIC | Age: 68
End: 2023-06-16

## 2023-06-16 ENCOUNTER — TELEPHONE (OUTPATIENT)
Dept: CARDIOLOGY CLINIC | Facility: CLINIC | Age: 68
End: 2023-06-16

## 2023-06-16 ENCOUNTER — APPOINTMENT (OUTPATIENT)
Dept: LAB | Facility: CLINIC | Age: 68
End: 2023-06-16
Payer: MEDICARE

## 2023-06-16 VITALS
SYSTOLIC BLOOD PRESSURE: 100 MMHG | TEMPERATURE: 97.6 F | OXYGEN SATURATION: 86 % | WEIGHT: 95 LBS | DIASTOLIC BLOOD PRESSURE: 50 MMHG | HEART RATE: 90 BPM | BODY MASS INDEX: 18.55 KG/M2

## 2023-06-16 DIAGNOSIS — C61 PROSTATE CANCER (HCC): Chronic | ICD-10-CM

## 2023-06-16 DIAGNOSIS — I50.22 CHRONIC HFREF (HEART FAILURE WITH REDUCED EJECTION FRACTION) (HCC): ICD-10-CM

## 2023-06-16 DIAGNOSIS — J44.1 CHRONIC OBSTRUCTIVE PULMONARY DISEASE WITH ACUTE EXACERBATION (HCC): Primary | ICD-10-CM

## 2023-06-16 DIAGNOSIS — J96.12 CHRONIC RESPIRATORY FAILURE WITH HYPOXIA AND HYPERCAPNIA (HCC): ICD-10-CM

## 2023-06-16 DIAGNOSIS — Z71.89 GOALS OF CARE, COUNSELING/DISCUSSION: ICD-10-CM

## 2023-06-16 DIAGNOSIS — E87.1 HYPONATREMIA: ICD-10-CM

## 2023-06-16 DIAGNOSIS — E46 PROTEIN-CALORIE MALNUTRITION, UNSPECIFIED SEVERITY (HCC): ICD-10-CM

## 2023-06-16 DIAGNOSIS — R91.8 PULMONARY NODULES/LESIONS, MULTIPLE: ICD-10-CM

## 2023-06-16 DIAGNOSIS — Z51.5 PALLIATIVE CARE PATIENT: ICD-10-CM

## 2023-06-16 DIAGNOSIS — I50.23 ACUTE ON CHRONIC SYSTOLIC CONGESTIVE HEART FAILURE (HCC): ICD-10-CM

## 2023-06-16 DIAGNOSIS — J96.11 CHRONIC RESPIRATORY FAILURE WITH HYPOXIA AND HYPERCAPNIA (HCC): ICD-10-CM

## 2023-06-16 DIAGNOSIS — R73.01 IMPAIRED FASTING GLUCOSE: Chronic | ICD-10-CM

## 2023-06-16 NOTE — TELEPHONE ENCOUNTER
Co2 > 45 on 6/11/23  and then 41 on 6/12/23  Just have to let you know because it is a critical  He always runs high    Thank you Giselle Cordero

## 2023-06-16 NOTE — TELEPHONE ENCOUNTER
Called and spoke with patient's wife  Asked if they got our message and want to go to [address] [facility] [address] [facility]  Wife stated they'll wait for [facility] because it is hard to get patient to appointments  Rescheduled to 8/31 in [place]

## 2023-06-16 NOTE — PATIENT INSTRUCTIONS
PRESCRIPTION REFILL REMINDER:  All medication refills should be requested prior to [facility] on Friday  Any refill requests after noon on Friday would be addressed the following Monday  Please protect yourself from [place]   = Wash your hands  Soap and water, or hand  with at least 60% alcohol, are both effective at killing the virus  = Wear a mask  This will help protect others from any virus particles you might spread  Your mouth and nose BOTH need to be covered  = Keep the distance  Keep 6 feet of distance from other people, even if they seem healthy  Keeping distance protects you from the other person's virus spread     = Get a vaccine, and boost it  Three vaccines are approved for use in the [place] for all adults  These vaccines do provide protection against all known variants, and the new 'bi-valent' booster is predicted to be more protective against Omicron variants   + Pfizer is FDA approved for all persons age 11 and older   + Leartis Schillings may be given to all person age 25 and older   - We do NOT recommend [address] vaccine for our Palliative Care patients  - [facility] is no longer offering regular COVID vaccine clinics  You may ask your primary doctor for help and advice  = you may also visit the CDC's complete list of approved sites for new vaccines: Vaccines  gov - Vaccine Location Search Results   (You may also visit Vaccines  gov and search for 'Bi-valent booster' in your zip code )  = [address] ([facility]), [employer], [facility], [facility], [facility], and many [facility] locations ALL have shots   + The CDC recommends booster shots on ALL vaccines for ALL adults  = Test to treat  If you have symptoms, get tested, and get treatment!   New drugs like Paxlovid can prevent you from ever having to go to the hospital , and may be covered completely by your insurance or special government programs    - https://aspr hhs gov/TestToTreat/ Informacion en espanol sobre vacunas, de nos companeros de [facility] --  Alvarado mclain    Check out [employer] for Kevin data that are updated daily:    http://www Meizu/     Global Epidemics  Org, from [facility] (OUTPATIENT CAMPUS), will give you Effqcr-lq-Zyuhpj information on virus cases and vaccination rates:    Https://globalepidemics  org/    Frequently Asked Questions about COVID, answered by [facility]    [employer] es

## 2023-06-26 ENCOUNTER — OFFICE VISIT (OUTPATIENT)
Dept: CARDIOLOGY CLINIC | Facility: CLINIC | Age: 68
End: 2023-06-26
Payer: COMMERCIAL

## 2023-06-26 VITALS
WEIGHT: 96.5 LBS | HEIGHT: 60 IN | OXYGEN SATURATION: 97 % | DIASTOLIC BLOOD PRESSURE: 60 MMHG | HEART RATE: 89 BPM | SYSTOLIC BLOOD PRESSURE: 104 MMHG | BODY MASS INDEX: 18.94 KG/M2

## 2023-06-26 DIAGNOSIS — I50.22 CHRONIC SYSTOLIC HEART FAILURE (HCC): Primary | ICD-10-CM

## 2023-06-26 DIAGNOSIS — Z09 HOSPITAL DISCHARGE FOLLOW-UP: ICD-10-CM

## 2023-06-26 DIAGNOSIS — R73.9 ELEVATED BLOOD SUGAR LEVEL: ICD-10-CM

## 2023-06-26 PROCEDURE — 99214 OFFICE O/P EST MOD 30 MIN: CPT | Performed by: PHYSICIAN ASSISTANT

## 2023-06-26 NOTE — PROGRESS NOTES
Advanced Heart Failure / Pulmonary Hypertension Outpatient Visit    Wen Viramontes  79 y o  male   MRN: [id number]  Encounter: [id number]    Assessment:  Patient Active Problem List    Diagnosis Date Noted   • Palliative care patient 06/13/2023   • Alkalosis 06/12/2023   • Severe protein-calorie malnutrition ([facility] ) 06/10/2023   • Pneumonia 06/05/2023   • Acute on chronic systolic congestive heart failure ([facility] ) 06/01/2023   • Moderate protein-calorie malnutrition ([facility] ) 05/09/2023   • HLD (hyperlipidemia) 05/02/2023   • COPD exacerbation ([facility] ) 04/28/2023   • Hyperglycemia 03/30/2023   • Physical deconditioning 02/21/2023   • Chronic anemia 02/17/2023   • Hyponatremia 02/17/2023   • Chronic HFrEF (heart failure with reduced ejection fraction) ([facility] ) 09/12/2022   • Protein-calorie malnutrition ([facility] ) 08/27/2022   • Pulmonary nodules/lesions, multiple 03/27/2022   • Prostate cancer ([facility] ) 05/24/2021   • BPH with obstruction/lower urinary tract symptoms 04/13/2021   • Goals of care, counseling/discussion 02/25/2021   • Chronic respiratory failure with hypoxia and hypercapnia ([facility] ) 11/19/2020   • Macrocytosis without anemia 05/23/2019   • Other insomnia 02/18/2019   • Gastroesophageal reflux disease 01/05/2017   • Nonobstructive atherosclerosis of coronary artery 02/25/2016   • Mood disorder ([facility] ) 08/18/2015   • Impaired fasting glucose 02/19/2015   • Osteoporosis 10/14/2014   • Vitamin D deficiency 10/14/2014   • Nonischemic cardiomyopathy ([facility] ) 09/26/2014   • Left bundle-branch block 08/03/2013   • Osteoarthritis 08/03/2013   • KEVIN and COPD overlap syndrome ([facility] ) 07/29/2013   • Chronic obstructive pulmonary disease with acute exacerbation ([facility] ) 07/18/2012       Today's Plan:  • Sent to ED via ambulance from last clinic visit for respiratory distress  Doing much better since hospitalization  • Warm, reasonable volume status on exam  Continue torsemide 20 mg daily    • Patient and wife wondering about getting a motorized scooter  Will look into this  • Follow up with PCP, pulmonary, palliative care  Hgb A1c ordered  • 2-3 week follow up in office  6 week follow up with Dr Edilberto Kelly  • Still full code  Not looking to pursue invasive measures at this time  Plan:  Acute on Chronic HFrEF; LVEF 20%; NYHA III; ACC/AHA Stage C              Etiology: Nonischemic cardiomyopathy   Possible dyssynchrony from chronic LBBB   Despite QRS only being 120 ms, echo shows significant dyssynchrony  Recent drop in EF likely related to stress myopathy with acute exacerbation of COPD  Weighed 115 lb on 5/10, 105 lb on 5/16/23, 104 lb 5/18, 103 lbs  Today, weighs 96 lbs       TTE 5/1/23: LVEF 20%  Severe global hypokinesis with regional variation  RV cavity size normal, systolic function normal  Trace MR, TR    TTE 2/21/23: LVEF 30%  LVIDd 6 6cm  severe global hypokinesis  Grade I DD  RV cavity size and systolic function normal  Mild TR  TTE 2/12/2023: LVEF 20-25%   Severe global hypokinesis with regional variation   Grade 1 DD   Abnormal septal motion consistent with LBBB   RV cavity mildly dilated, normal systolic function   Mild MR, mild TR   RVSP 38   Dilated IVC    TTE 8/26/2022: LVEF 40%   RV cavity mildly dilated   Systolic function normal   Mild MR, TR   Normal IVC      Neurohormonal Blockade:  --Beta Blocker: no  --ARNi / ACEi / Tommie Rogue 12 5 mg QD, off   --Aldosterone Antagonist: no   --SGLT2 Inhibitor: no  --Home Diuretic: torsemide 20 mg QD     Sudden Cardiac Death Risk Reduction:  --ICD: LVEF newly reduced to 20-25%      Electrical Resynchronization:  --Candidacy for BiV device: QRSd 120ms, chronic LBBB with dyssynchrony on echocardiogram      Advanced Therapies (if appropriate): Not appropriate at this time, particularly considering advanced lung disease         COVID-19  Tested positive on 2/22/23    Nonobstructive CAD  Has coronary calcium on CT  On aspirin and statin  Hyperlipidemia  Continue statin  LDL 75 8/2021  COPD (end stage)  Former smoker; 105 pack years, quit in 2012  Severe disease, follows closely with pulm   On home oxygen, 3L  Multiple hospitalizations with acute COPD exacerbations   Management per pulm  KEVIN   On BiPAP nightly  GOC  Introduced to palliative care team this past admit  Has seen them recently outpatient  HPI:   Jared Infante Jr  is a 74year-old male who presented to [facility] on 2/12/2023 with an acute COPD exacerbation requiring intubation  Extubated 2/15/23   PMH includes nonischemic cardiomyopathy, chronic systolic and diastolic heart failure, nonobstructive CAD, hyperlipidemia, severe COPD, KEVIN   Was previously admitted in 8/2022 for acute decompensated heart failure and COPD exacerbation   LVEF 40% at that time, down from prior of 45 to 50%, though previously EF had been 35% for years   Started on Lasix at that time  Georgia Jaime been euvolemic, though blood pressures have made it difficult to start/titrate up on GDMT   Recently saw Dr Delmy Crouch in the office in December, at which time his Lasix was shifted to as needed and adequate oral intake and hydration were encouraged      He was reportedly short of breath for 2 days prior to presentation, with hallucinations   His wife called 911 and EMS intubated him in the field  Rainer Herman was found to have an elevated procalcitonin on admission and started on azithromycin and ceftriaxone   He was given IV Lasix with good urine output   He was weaned off the ventilator and extubated on 2/15   Maintained on BiPAP overnight and has been receiving scheduled nebulizers with aggressive pulmonary hygiene   TTE on admission with LVEF 20 to 25%, down from 40% in 8/2022       He is also followed by pulmonology, who recommended consideration for home hospice for severe Ryne Nuñez was seen by palliative care while inpatient here, and patient and family elected to make code status level 3 DNR/DNI   Pulmonology has been following him as well and has been managing steroids, bronchodilators, and nebulizer therapy  Per TH: 3/7/23  Presents for post hospital follow up with his wife  Just discharged from [address] [place] [address]  Feeling at his baseline  Gets SOB with minimal exertion  O2 sats in the low 80's on 3-4 L NC,  on initial assessment today  Has complaints that his feet are red and swollen today  Wants to keep fighting  Dreading another hospital admission  Diuretics increased after visit with Maldonado Liner (Lasix increased to BID x 3 days )    Recently hospitalized from 4/30-5/10/23 for acute respiratory failure in the setting of COPD  He was admitted initially to the ICU and treated with IV steroids, scheduled nebulized breathing treatments, and increased supplemental oxygen  He also required BiPAP  Ultimately he was stabilized and transition to the floor  Goals of care were discussed at length with the patient and his wife  He is currently a DNR/DNI  Plan per chart review per patient and his wife; plan with next exacerbation is to return to the hospital, but to transition to palliative care and comfort measures at that time  Referred to home hospice as of 5/12/23, wife expressed they would like to speak to palliative on Friday before making a final decision  5/17/23: presents today for follow up  Feels at his baseline, O2 sats mid to high 90s on home 3L NC  Some LE swelling, improves with elevating his legs  Able to complete ADLs and move around the house, SOB not increased from his baseline  Meeting with palliative care tomorrow  Denies chest pain, palpitations, dizziness, syncope  Feels like he's urinating much more with torsemide than he did with Lasix  Weighs himself daily, small fluctuations but overall stable  6/1/23: presents today for follow up  In respiratory distress, confused, tachypneic  Wife reports this has been worsening since this morning; no recent trigger, fevers, chills  +LE swelling   Confirmed at today's appointment that patient is currently FULL CODE  EMS called, transported via ambulance to ED, transfer order placed  6/26/23: presents today for follow up  Hospitalized at \[facility]"" from 6/1 to 6/12 for respiratory distress, treated for COPD exacerbation with steroids and breathing treatments, eventually weaned off BiPAP  Treated with IV Lasix as well  Reaffirmed full CODE STATUS  Doing better today, feeling improved since hospitalization  Appetite has been down, supplementing with Ensure  Minimal lower extremity swelling, shortness of breath at baseline  Urinating well with torsemide  EMS was called on 6/13 by [facility], as patient was hypoxic and short of breath on their arrival   EMS checked oxygen per wife, reports that was above 92%  Does report that they noticed elevated blood sugar, so she has been checking his sugars at home and noting numbers in the 200s  No further EMS calls, has been tolerating medications  Past Medical History:   Diagnosis Date   • Acute metabolic encephalopathy 7/01/6737   • Arthritis    • Bladder mass    • Cardiomyopathy [facility])    • Chest pain    • COPD (chronic obstructive pulmonary disease) ([facility]    • COVID-19 virus infection 2/23/2023   • CPAP (continuous positive airway pressure) dependence    • Emphysema of lung ([facility])    • Hypoxia     nocturnal   • Left bundle branch block    • Multiple pulmonary nodules     last assessed: 10/12/16   • Pneumonia    • Sleep apnea    • Sleep apnea, obstructive    • Smoker    • Weight loss 8/29/2019     Review of Systems   Constitutional: Negative for chills and fever  HENT: Negative for ear pain  Eyes: Negative for pain  Respiratory: Positive for shortness of breath (baseline)  Cardiovascular: Positive for leg swelling (baseline)  Negative for chest pain and palpitations  Gastrointestinal: Negative for abdominal pain and vomiting  Genitourinary: Negative for dysuria and hematuria  Musculoskeletal: Negative for arthralgias and back pain  Skin: Negative for rash     Neurological: Negative for seizures  All other systems reviewed and are negative  14-point ROS completed and negative except as stated above and/or in the HPI        No Known Allergies    Current Outpatient Medications:   •  acetaminophen (TYLENOL) 325 mg tablet, Take 3 tablets (975 mg total) by mouth every 8 (eight) hours as needed for mild pain or moderate pain, Disp: , Rfl: 0  •  albuterol (PROVENTIL HFA,VENTOLIN HFA) 90 mcg/act inhaler, Inhale 2 puffs every 6 (six) hours as needed for wheezing, Disp: 8 5 g, Rfl: 2  •  aspirin 81 mg chewable tablet, Chew 1 tablet (81 mg total) daily, Disp: 30 tablet, Rfl: 0  •  budesonide (PULMICORT) 0 5 mg/2 mL nebulizer solution, TAKE 2 ML (0 5 MG TOTAL) BY NEBULIZATION TWICE A DAY RINSE MOUTH AFTER USE, Disp: 360 mL, Rfl: 2  •  chlorhexidine (PERIDEX) 0 12 % solution, Apply 15 mL to the mouth or throat every 12 (twelve) hours, Disp: 120 mL, Rfl: 0  •  cyanocobalamin (VITAMIN B-12) 1000 MCG tablet, Take 1 tablet (1,000 mcg total) by mouth daily, Disp: 30 tablet, Rfl: 0  •  Diclofenac Sodium (VOLTAREN) 1 %, APPLY 2 GRAMS 4 TIMES A DAY, Disp: , Rfl:   •  ergocalciferol (VITAMIN D2) 50,000 units, TAKE 1 CAPSULE (50,000 UNITS TOTAL) BY MOUTH EVERY 28 DAYS, Disp: 3 capsule, Rfl: 1  •  formoterol (PERFOROMIST) 20 MCG/2ML nebulizer solution, TAKE 2 ML (20 MCG TOTAL) BY NEBULIZATION 2 (TWO) TIMES A DAY, Disp: , Rfl:   •  guaiFENesin (MUCINEX) 600 mg 12 hr tablet, Take 2 tablets (1,200 mg total) by mouth every 12 (twelve) hours, Disp: 60 tablet, Rfl: 6  •  ipratropium-albuterol (DUO-NEB) 0 5-2 5 mg/3 mL nebulizer solution, Take 3 mL by nebulization 4 (four) times a day, Disp: 360 mL, Rfl: 11  •  Melatonin 5 MG TABS, Take 1 tablet by mouth daily at bedtime, Disp: , Rfl:   •  predniSONE 10 mg tablet, Take 4 tablets (40 mg total) by mouth daily Reduce dose by 10 mg every 5 days  until you reach your regular dosing of steroids 5 mg daily per your lung doctro Do not start before June 13, 2023 , Disp: 48 tablet, Rfl: 0  •  rosuvastatin (CRESTOR) 10 MG tablet, Take 1 tablet (10 mg total) by mouth daily, Disp: 90 tablet, Rfl: 3  •  sodium chloride (OCEAN) 0 65 % nasal spray, 1 spray into each nostril every hour as needed for congestion, Disp: 30 mL, Rfl: 0  •  tamsulosin (FLOMAX) 0 4 mg, Take 1 capsule (0 4 mg total) by mouth daily with dinner, Disp: , Rfl: 0  •  torsemide (DEMADEX) 20 mg tablet, Take 1 tablet (20 mg total) by mouth daily, Disp: 90 tablet, Rfl: 3  •  docusate sodium (COLACE) 100 mg capsule, Take 100 mg by mouth 2 (two) times a day (Patient not taking: Reported on 2023), Disp: , Rfl:   •  hydrOXYzine HCL (ATARAX) 25 mg tablet, Take 1 tablet (25 mg total) by mouth every 6 (six) hours as needed for anxiety (Patient not taking: Reported on 2023), Disp: 30 tablet, Rfl: 0    Social History     Socioeconomic History   • Marital status: /Civil Union     Spouse name: Not on file   • Number of children: Not on file   • Years of education: Not on file   • Highest education level: Not on file   Occupational History   • Not on file   Tobacco Use   • Smoking status: Former     Packs/day: 2 50     Years: 42 00     Total pack years: 105 00     Types: Cigarettes     Start date: 5     Quit date:      Years since quittin 4   • Smokeless tobacco: Former   • Tobacco comments:     1 ppd for 37 years, 2010 down to 5 cigs a day, is around second hand smoke   Vaping Use   • Vaping Use: Never used   Substance and Sexual Activity   • Alcohol use: Not Currently     Comment: rarely   • Drug use: No   • Sexual activity: Not Currently     Partners: Female   Other Topics Concern   • Not on file   Social History Narrative    Daily coffee consumption: 8 or more cups a day        Used to work in [employer]  On disability       Social Determinants of Health     Financial Resource Strain: Not on file   Food Insecurity: No Food Insecurity (2023)    Hunger Vital Sign    • Worried About Running Out of Food in the Last Year: Never true    • Ran Out of Food in the Last Year: Never true   Transportation Needs: No Transportation Needs (6/2/2023)    PRAPARE - Transportation    • Lack of Transportation (Medical): No    • Lack of Transportation (Non-Medical): No   Physical Activity: Not on file   Stress: Not on file   Social Connections: Not on file   Intimate Partner Violence: Not on file   Housing Stability: Unknown (6/2/2023)    Housing Stability Vital Sign    • Unable to Pay for Housing in the Last Year: No    • Number of Places Lived in the Last Year: Not on file    • Unstable Housing in the Last Year: No     Family History   Problem Relation Age of Onset   • Diabetes Mother        Vitals:  Blood pressure 104/60, pulse 89, height 5' (1 524 m), weight 43 8 kg (96 lb 8 oz), SpO2 97 %  Body mass index is 18 85 kg/m²  Wt Readings from Last 10 Encounters:   06/26/23 43 8 kg (96 lb 8 oz)   06/16/23 43 1 kg (95 lb)   06/12/23 47 8 kg (105 lb 4 8 oz)   06/01/23 46 7 kg (103 lb)   05/19/23 46 8 kg (103 lb 2 8 oz)   05/18/23 47 2 kg (104 lb)   05/16/23 48 kg (105 lb 12 8 oz)   05/10/23 52 4 kg (115 lb 9 6 oz)   04/13/23 48 5 kg (107 lb)   03/27/23 46 3 kg (102 lb)     Vitals:    06/26/23 1549   BP: 104/60   BP Location: Left arm   Patient Position: Sitting   Cuff Size: Standard   Pulse: 89   SpO2: 97%   Weight: 43 8 kg (96 lb 8 oz)   Height: 5' (1 524 m)       Physical Exam  Constitutional:       General: He is not in acute distress  Appearance: He is ill-appearing  HENT:      Head: Normocephalic  Nose: Nose normal       Mouth/Throat:      Mouth: Mucous membranes are moist    Eyes:      Conjunctiva/sclera: Conjunctivae normal    Neck:      Vascular: No JVD  Cardiovascular:      Rate and Rhythm: Normal rate and regular rhythm  Comments: Pitting edema at ankles  Pulmonary:      Breath sounds: Wheezing (expiratory) present        Comments: decreased air movement  Musculoskeletal:      Cervical back: Neck supple  Right lower leg: Edema present  Left lower leg: Edema present  Skin:     General: Skin is dry  Neurological:      General: No focal deficit present  Mental Status: He is alert  Psychiatric:         Mood and Affect: Mood normal          Labs & Results:  Lab Results   Component Value Date    WBC 11 50 (H) 06/12/2023    HGB 9 8 (L) 06/12/2023    HCT 30 7 (L) 06/12/2023    MCV 99 (H) 06/12/2023     06/12/2023     Lab Results   Component Value Date    SODIUM 138 06/16/2023    K 3 8 06/16/2023    CL 87 (L) 06/16/2023    CO2 >45 (HH) 06/16/2023    BUN 24 06/16/2023    CREATININE 0 62 06/16/2023    GLUC 148 (H) 06/16/2023    CALCIUM 9 2 06/16/2023     Lab Results   Component Value Date    INR 0 84 06/01/2023    INR 0 93 02/12/2023    INR 0 95 03/27/2022    PROTIME 11 7 06/01/2023    PROTIME 12 6 02/12/2023    PROTIME 12 3 03/27/2022     Lab Results   Component Value Date     (H) 06/01/2023      Lab Results   Component Value Date    NTBNP 17,569 (H) 04/30/2023        Thank you for the opportunity to participate in the care of this patient      Mary Foster PA-C

## 2023-06-26 NOTE — PATIENT INSTRUCTIONS
Follow up with your pulmonologist and PCP  Get labs as ordered  Please weigh yourself every day (after emptying your bladder) and keep a detailed log of weights  Contact the Heart Failure program at [phone] if you gain 3 lbs overnight or 5 lbs in 5-7 days  Limit daily sodium/salt intake to 2000 mg daily to prevent fluid retention  Avoid canned foods, fast food/Chinese food, and processed meats (hot dogs, lunch meat, and sausage etc )  Caution with condiments  Limit fluid intake to 2000 mL or 2 liters (about 60-65 ounces) daily  Avoid electrolyte replacement drinks (such as Gatorade, Pedialyte, Propel, Liquid IV, etc )  Bring complete list of medications and log of daily weights to your follow-up appointment

## 2023-06-27 ENCOUNTER — PATIENT OUTREACH (OUTPATIENT)
Dept: CASE MANAGEMENT | Facility: HOSPITAL | Age: 68
End: 2023-06-27

## 2023-06-27 NOTE — PROGRESS NOTES
Outpatient Advanced Heart Failure LCSW received referral from HF PA, Juan M David that pt would like motorized scooter  LCSW left message for pt informing him to make appt with [facility] [phone]

## 2023-07-03 NOTE — ASSESSMENT & PLAN NOTE
Evaluated by PT/OT with recommendation for postacute rehab  Case management working on rehab options with family 
Patient tested positive for COVID on 2/26/2023  Patient with baseline oxygen requirements at 3 L nasal cannula      · Monitor WBC and fever curve  · Continue oxygen segmentation to maintain SPO2 >88%  · Mucinex ordered  · Chest x-ray ordered today, pending official results  · We will start azithromycin x 5 days today  · Procal AM
Sodium 128 today  Continue to monitor  If worsening, will place for further work-up 
Speech box speech box
· Chronically on 3 L at home  · Currently on 4 L supplemental O2  · Continue with treatment plan as above
· Chronically on 3 L at home  · Currently on 4 L supplemental O2  · Continue with treatment plan as above
· Chronically on 3 L at home  · Currently on baseline 3 L supplemental O2  · Continue with treatment plan as above
· Chronically on 3 L at home  · Currently weaned down to 3 to 4 L supplemental O2  · Continue with treatment plan as above
· Continue B12 and iron supplementation on discharge
· Continue PPI
· Coronary calcification seen on prior CT   · Follows with Dr Alfred Banerjee as an outpatient  · Continue with ASCVD prophylaxis with ASA and statin
· Coronary calcification seen on prior CT   · Follows with Dr Con Engle as an outpatient  · Continue with ASCVD prophylaxis with ASA and statin
· Coronary calcification seen on prior CT   · Follows with Dr Corinne Melia as an outpatient  · Continue with ASCVD prophylaxis with ASA and statin
· Coronary calcification seen on prior CT   · Follows with Dr Haleigh Goncalves as an outpatient  · Continue with ASCVD prophylaxis with ASA and statin
· Coronary calcification seen on prior CT   · Follows with Dr Jeanine Purcell as an outpatient  · Continue with ASCVD prophylaxis with ASA and statin
· Coronary calcification seen on prior CT   · Follows with Dr Jeff Luciano as an outpatient  · Continue with ASCVD prophylaxis with ASA and statin
· Coronary calcification seen on prior CT   · Follows with Dr Lakisha Hernández as an outpatient  · Continue with ASCVD prophylaxis with ASA and statin
· Coronary calcification seen on prior CT   · Follows with Dr Porsche Delatorre as an outpatient  · Continue with ASCVD prophylaxis with ASA and statin
· Coronary calcification seen on prior CT   · Follows with Dr Singh Gill as an outpatient  · Continue with ASCVD prophylaxis with ASA and statin
· Coronary calcification seen on prior CT   · Follows with Dr Waldemar Barry as an outpatient  · Continue with ASCVD prophylaxis with ASA and statin
· Evaluated by PT/OT with recommendation for postacute rehab    · Case management following, plan for [facility]
· Follows with Pulm as an outpatient  · Last FEV1 22%, consistent with very-severe COPD  · Home regimen:  · 3L O2 at home  · All nebs- Duo-Neb/Pulmicort/Perforomist  · Albuterol rescue inhaler  · Prednisone 5 daily  · Multiple prior exacerbations requiring admission  · Intubated 2/12, extubated 2/15    · Continue bronchodilators - scheduled Xopenx/Atrovent TID, perforomist/pulmicort BID on discharge  · Continue p o  prednisone taper on discharge  · Aggressive pulmonary hygiene- use of incentive spirometry, VEST/airway clearance, OOB to chair as able  · Palliative consulted, appreciate recommendations  · Goals of care discussed prior with patient with wife at bedside, patient to remain level 3 DNR/DNI   · Discharged to [facility] rehab for ongoing care
· Follows with Pulm as an outpatient  · Last FEV1 22%, consistent with very-severe COPD  · Home regimen:  · 3L O2 at home  · All nebs- Duo-Neb/Pulmicort/Perforomist  · Albuterol rescue inhaler  · Prednisone 5 daily  · Multiple prior exacerbations requiring admission  · Intubated 2/12, extubated 2/15  · Currently requiring 3 L supplemental O2  · Wean O2 as able  · Maintain O2 sats between 88-92%  · Continue bronchodilators - scheduled Xopenx/Atrovent TID, perforomist/pulmicort BID  · Status post Solu-medrol 40 mg TID; now continue PO taper as per Pulm recs  · Aggressive pulmonary hygiene- use of incentive spirometry, VEST/airway clearance, OOB to chair as able  · Palliative consulted, appreciate recommendations  · Goals of care discussed prior with patient with wife at bedside, patient to remain level 3 DNR/DNI
· Follows with Pulm as an outpatient  · Last FEV1 22%, consistent with very-severe COPD  · Home regimen:  · 3L O2 at home  · All nebs- Duo-Neb/Pulmicort/Perforomist  · Albuterol rescue inhaler  · Prednisone 5 daily  · Multiple prior exacerbations requiring admission  · Intubated 2/12, extubated 2/15  · Currently requiring 4 L supplemental O2  · Wean O2 as able  · Maintain O2 sats between 88-92%  · Continue bronchodilators - scheduled Xopenx/Atrovent TID, perforomist/pulmicort BID  · Status post Solu-medrol 40 mg TID; now continue PO taper as per Pulm recs  · Aggressive pulmonary hygiene- use of incentive spirometry, VEST/airway clearance, OOB to chair as able  · Palliative consulted, appreciate recommendations  · Goals of care discussed prior with patient with wife at bedside, patient to remain level 3 DNR/DNI
· Follows with Pulm as an outpatient  · Last FEV1 22%, consistent with very-severe COPD  · Home regimen:  · 3L O2 at home  · All nebs- Duo-Neb/Pulmicort/Perforomist  · Albuterol rescue inhaler  · Prednisone 5 daily  · Multiple prior exacerbations requiring admission  · Intubated 2/12, extubated 2/15  · Currently requiring 4 L supplemental O2  · Wean O2 as able  · Maintain O2 sats between 88-92%  · Continue bronchodilators - scheduled Xopenx/Atrovent TID, perforomist/pulmicort BID  · Status post Solu-medrol 40 mg TID; now continue PO taper as per Pulm recs  · Aggressive pulmonary hygiene- use of incentive spirometry, VEST/airway clearance, OOB to chair as able  · Palliative consulted, appreciate recommendations  · Goals of care discussed prior with patient with wife at bedside, patient to remain level 3 DNR/DNI
· Follows with Pulm as an outpatient  · Last FEV1 22%, consistent with very-severe COPD  · Home regimen:  · 3L O2 at home  · All nebs- Duo-Neb/Pulmicort/Perforomist  · Albuterol rescue inhaler  · Prednisone 5 daily  · Multiple prior exacerbations requiring admission  · Intubated 2/12, extubated 2/15  · Currently requiring 4 L supplemental O2  · Wean O2 as able  · Maintain O2 sats between 88-92%  · Continue bronchodilators - scheduled Xopenx/Atrovent TID, perforomist/pulmicort BID  · Status post Solu-medrol 40 mg TID; switched to PO taper  · Aggressive pulmonary hygiene- use of incentive spirometry, VEST/airway clearance, OOB to chair as able  · Palliative consulted, appreciate recommendations  · Goals of care discussed prior with patient with wife at bedside, patient to remain level 3 DNR/DNI
· Follows with pulm as an outpatient  · Last FEV1 22%, consistent with very-severe COPD  · Home regimen:  · 3L O2 at home  · All nebs- Duo-Neb/Pulmicort/Perforomist  · Albuterol rescue inhaler  · Prednisone 5 daily  · Multiple prior exacerbations requiring admission  · Intubated 2/12, extubated 2/15    · Currently requiring 3 to 4 L supplemental O2  · Wean O2 as able  · Maintain O2 sats between 88-92%  · Continue bronchodilators-- scheduled Xopenx/Atrovent QID, perforomist/pulmicort BID  · Status post Solu-medrol 40 mg TID; switched to prednisone for 40 mg daily with taper  · Aggressive pulmonary hygiene- use of incentive spirometry, VEST/airway clearance, OOB to chair as able  · Palliative consulted, appreciate recommendations  · Goals of care discussed with patient with wife at bedside, patient to remain level 3 DNR/DNI
· Follows with pulm as an outpatient  · Last FEV1 22%, consistent with very-severe COPD  · Home regiment:  · 3L O2 at home  · All nebs- Duo-Neb/Pulmicort/Perforomist  · Albuterol rescue inhaler  · Daily prednisone 5mg  · Multiple prior exacerbations requiring admission  · Intubated 2/12, extubated 2/15    · Currently requiring 3 to 4 L supplemental O2  · Wean O2 as able  · Maintain O2 sats between 88-92%  · Continue bronchodilators-- scheduled Xopenx/Atrovent QID, perforomist/pulmicort BID  · Continue Solu-medrol 40 mg TID; will need to wean over the next week or so  · Aggressive pulmonary hygiene- use of incentive spirometry, VEST/airway clearance, OOB to chair as able  · Palliative consulted, appreciate recommendations  · Goals of care discussed with patient with wife at bedside, patient to remain level 3 DNR/DNI
· Follows with pulm as an outpatient  · Last FEV1 22%, consistent with very-severe COPD  · Home regiment:  · 3L O2 at home  · All nebs- Duo-Neb/Pulmicort/Perforomist  · Albuterol rescue inhaler  · Prednisone 5 daily  · Multiple prior exacerbations requiring admission  · Intubated 2/12, extubated 2/15    · Currently requiring 3 to 4 L supplemental O2  · Wean O2 as able  · Maintain O2 sats between 88-92%  · Continue bronchodilators-- scheduled Xopenx/Atrovent QID, perforomist/pulmicort BID  · Continue Solu-medrol 40 mg TID; will start prednisone for 40 mg daily tomorrow with taper  · Aggressive pulmonary hygiene- use of incentive spirometry, VEST/airway clearance, OOB to chair as able  · Palliative consulted, appreciate recommendations  · Goals of care discussed with patient with wife at bedside, patient to remain level 3 DNR/DNI
· Follows with pulm as an outpatient  · Last FEV1 22%, consistent with very-severe COPD  · Home regiment:  · 3L O2 at home  · All nebs- Duo-Neb/Pulmicort/Perforomist  · Albuterol rescue inhaler  · Prednisone 5 daily  · Multiple prior exacerbations requiring admission  · Intubated 2/12, extubated 2/15    · Currently requiring 3 to 4 L supplemental O2  · Wean O2 as able  · Maintain O2 sats between 88-92%  · Continue bronchodilators-- scheduled Xopenx/Atrovent QID, perforomist/pulmicort BID  · Continue Solu-medrol 40 mg TID; will start prednisone for 40 mg daily tomorrow with taper  · Aggressive pulmonary hygiene- use of incentive spirometry, VEST/airway clearance, OOB to chair as able  · Palliative consulted, appreciate recommendations  · Goals of care discussed with patient with wife at bedside, patient to remain level 3 DNR/DNI
· Follows with pulm as an outpatient  · Last FEV1 22%, consistent with very-severe COPD  · Home regiment:  · 3L O2 at home  · All nebs- Duo-Neb/Pulmicort/Perforomist  · Albuterol rescue inhaler  · Prednisone 5 daily  · Multiple prior exacerbations requiring admission  · Intubated 2/12, extubated 2/15    · Currently requiring 3 to 4 L supplemental O2  · Wean O2 as able  · Maintain O2 sats between 88-92%  · Continue bronchodilators-- scheduled Xopenx/Atrovent QID, perforomist/pulmicort BID  · Status post Solu-medrol 40 mg TID; switched to prednisone for 40 mg daily with taper  · Aggressive pulmonary hygiene- use of incentive spirometry, VEST/airway clearance, OOB to chair as able  · Palliative consulted, appreciate recommendations  · Goals of care discussed with patient with wife at bedside, patient to remain level 3 DNR/DNI
· Hgb low but stable in 7's  No overt signs of GI bleed  · Iron panel suggesting mixed iron deficiency with likely anemia of chronic disease contribution  · Retic index   74 suggesting hypoproliferation  · Continue ferrous sulfate supplementation   · Trend CBC, transfuse if Hgb drops below 7  · On DVT ppx
· Hgb low but stable in 7's  No overt signs of GI bleed  · Iron panel suggesting mixed iron deficiency with likely anemia of chronic disease contribution  · Retic index   74 suggesting hypoproliferation  · Elevated MCV  · Continue ferrous sulfate supplementation   · Trend CBC, transfuse if Hgb drops below 7  · Given patient has signs of numbness/tingling on the tip of the fingers, was started on vitamin B12 supplementation  · On DVT ppx
· Hgb low but stable in 7's  No overt signs of GI bleed  · Iron panel suggesting mixed iron deficiency with likely anemia of chronic disease contribution  · Retic index   74 suggesting hypoproliferation  · Elevated MCV  · Continue ferrous sulfate supplementation   · Trend CBC, transfuse if Hgb drops below 7  · Given patient has signs of numbness/tingling on the tip of the fingers, will start patient on vitamin B12 supplementation  · On DVT ppx
· Hgb low but stable in 7's  No overt signs of GI bleed  · Iron panel suggesting mixed iron deficiency with likely anemia of chronic disease contribution  · Retic index   74 suggesting hypoproliferation  · Elevated MCV  · Continue ferrous sulfate supplementation   · Trend CBC, transfuse if Hgb drops below 7  · Given patient has signs of numbness/tingling on the tip of the fingers, will start patient on vitamin B12 supplementation  · On DVT ppx
· Known history  · Possibly contributing to non-ischemic cardiomyopathy
· Meet SIRS criteria on admission  · S/P 3-day course of azithromycin   · Continue ceftriaxone day 6/7; procalcitonin elevated on admission, now normalized  · Trend CBC and fever curve
· NYHA class: III  / ACC/AHA stage: C; in setting of very severe COPD (FEV1 22%)  · Follows with Dr Bahman Arshad as an outpatient  · EF 40% PTA on lasix 20mg qd at home  · TTE on admission showing EF 20-25% with abnormal septal motion in setting of chronic LBBB, RVSP 38, mildly dilated RV  · Clinical presentation on arrival consistent with biventricular heart failure in setting of AECOPD    · Cont po lasix 40mg daily  · Goal-directed medical therapy: Beta-blocker concern due to blood pressure and end-stage COPD  · Sudden cardiac death risk reduction:  · Not candidate for ICD/Life Vest candidacy due to DNR/DNI  · Of note, patient has known hx of LBBB with QRS around 120ms    · Cardiac diet, sodium restriction <2g, fluid restriction <1 5L  · Daily I&Os  · MAP goal >65   · Consulted heart failure service given newly decreased ejection fraction, appreciate recs  -Discontinued metoprolol, started losartan 12 5 mg  -Plan to start MRA today  -Echocardiogram ordered to assess for improvement in systolic function
· NYHA class: III  / ACC/AHA stage: C; in setting of very severe COPD (FEV1 22%)  · Follows with Dr Cecelia Nyhan as an outpatient  · EF 40% PTA on lasix 20mg qd at home  · TTE on admission showing EF 20-25% with abnormal septal motion in setting of chronic LBBB, RVSP 38, mildly dilated RV  · Clinical presentation on arrival consistent with biventricular heart failure in setting of AECOPD  · Repeated TTE on 2/21 to reassess EF showed EF 30%  · Continue PO Lasix 20mg daily  · Goal-directed medical therapy  · Sudden cardiac death risk reduction:  · Not candidate for ICD/Life Vest candidacy due to DNR/DNI  · Of note, patient has known hx of LBBB with QRS around 120ms    · Cardiac diet, sodium restriction <2g, fluid restriction <1 5L  · Daily I&Os  · MAP goal >65   · Consulted heart failure service given newly decreased ejection fraction, appreciate recs  -Discontinued metoprolol due to wheezing  -Continue losartan 12 5 mg  -Plan to start MRA in the future  -Outpatient cardiology/heart failure follow-up
· NYHA class: III  / ACC/AHA stage: C; in setting of very severe COPD (FEV1 22%)  · Follows with Dr Doria Schilder as an outpatient  · EF 40% PTA on lasix 20mg qd at home  · TTE on admission showing EF 20-25% with abnormal septal motion in setting of chronic LBBB, RVSP 38, mildly dilated RV  · Clinical presentation on arrival consistent with biventricular heart failure in setting of AECOPD    · Cont po lasix 40mg daily  · Goal-directed medical therapy: Beta-blocker concern due to blood pressure and end-stage COPD  · Sudden cardiac death risk reduction:  · ICD/Life Vest candidacy: can be a candidate if repeat TTE shows EF<35%  · Of note, patient has known hx of LBBB with QRS around 120ms    · Cardiac diet, sodium restriction <2g, fluid restriction <1 5L  · Daily I&Os  · Consider repeat TTE in future to assess for improvement in systolic function  · MAP goal >65
· NYHA class: III  / ACC/AHA stage: C; in setting of very severe COPD (FEV1 22%)  · Follows with Dr Harriet Bryan as an outpatient  · EF 40% PTA on lasix 20mg qd at home  · TTE on admission showing EF 20-25% with abnormal septal motion in setting of chronic LBBB, RVSP 38, mildly dilated RV  · Clinical presentation on arrival consistent with biventricular heart failure in setting of AECOPD  · Repeated TTE on 2/21 to reassess EF showed EF 30%  · Continue PO Lasix 20mg daily  · Goal-directed medical therapy  · Sudden cardiac death risk reduction:  · Not candidate for ICD/Life Vest candidacy due to DNR/DNI  · Of note, patient has known hx of LBBB with QRS around 120ms    · Cardiac diet, sodium restriction <2g, fluid restriction <1 5L  · Daily I&Os  · MAP goal >65   · Consulted heart failure service given newly decreased ejection fraction, appreciate recs  -Discontinued metoprolol due to wheezing  -Continue losartan 12 5 mg  -Plan to start MRA in the future  -Outpatient cardiology/heart failure follow-up
· NYHA class: III  / ACC/AHA stage: C; in setting of very severe COPD (FEV1 22%)  · Follows with Dr Janeen Mercedes as an outpatient  · EF 40% PTA on lasix 20mg qd at home  · TTE on admission showing EF 20-25% with abnormal septal motion in setting of chronic LBBB, RVSP 38, mildly dilated RV  · Clinical presentation on arrival consistent with biventricular heart failure in setting of AECOPD  · Repeated TTE on 2/21 to reassess EF showed EF 30%  · Continue PO Lasix 40mg daily  · Goal-directed medical therapy  · Sudden cardiac death risk reduction:  · Not candidate for ICD/Life Vest candidacy due to DNR/DNI  · Of note, patient has known hx of LBBB with QRS around 120ms    · Cardiac diet, sodium restriction <2g, fluid restriction <1 5L  · Daily I&Os  · MAP goal >65   · Consulted heart failure service given newly decreased ejection fraction, appreciate recs  -Discontinued metoprolol due to wheezing  -Continue losartan 12 5 mg  -Plan to start MRA in the future  -Outpatient cardiology/heart failure follow-up
· NYHA class: III  / ACC/AHA stage: C; in setting of very severe COPD (FEV1 22%)  · Follows with Dr Jeff Luciano as an outpatient  · EF 40% PTA on lasix 20mg qd at home  · TTE on admission showing EF 20-25% with abnormal septal motion in setting of chronic LBBB, RVSP 38, mildly dilated RV  · Clinical presentation on arrival consistent with biventricular heart failure in setting of AECOPD  · Repeated TTE on 2/21 to reassess EF showed EF 30%  · Heart Failure Team giving dose of IV Lasix today  · Goal-directed medical therapy  · Sudden cardiac death risk reduction:  · Not candidate for ICD/Life Vest candidacy due to DNR/DNI  · Of note, patient has known hx of LBBB with QRS around 120ms    · Cardiac diet, sodium restriction <2g, fluid restriction <1 5L  · Daily I&Os  · MAP goal >65   · Consulted heart failure service given newly decreased ejection fraction, appreciate recs  -Discontinued metoprolol due to wheezing  -Continue losartan 12 5 mg  -Plan to start MRA in the future  -Outpatient cardiology/heart failure follow-up
· NYHA class: III  / ACC/AHA stage: C; in setting of very severe COPD (FEV1 22%)  · Follows with Dr José Miguel Moreau as an outpatient  · EF 40% PTA on lasix 20mg qd at home  · TTE on admission showing EF 20-25% with abnormal septal motion in setting of chronic LBBB, RVSP 38, mildly dilated RV  · Clinical presentation on arrival consistent with biventricular heart failure in setting of AECOPD  · Repeated TTE on 2/21 to reassess EF showed EF 30%  · Goal-directed medical therapy on losartan; not on BB given severe end stage COPD  · Sudden cardiac death risk reduction:  · Not candidate for ICD/Life Vest candidacy due to DNR/DNI  · Of note, patient has known hx of LBBB with QRS around 120ms    · Cardiac diet, sodium restriction <2g, fluid restriction <1 5L
· NYHA class: III  / ACC/AHA stage: C; in setting of very severe COPD (FEV1 22%)  · Follows with Dr Juliano Yan as an outpatient  · EF 40% PTA on lasix 20mg qd at home  · TTE on admission showing EF 20-25% with abnormal septal motion in setting of chronic LBBB, RVSP 38, mildly dilated RV  · Clinical presentation on arrival consistent with biventricular heart failure in setting of AECOPD  · Repeated TTE on 2/21 to reassess EF showed EF 30%  · Continue PO Lasix 20mg daily  · Goal-directed medical therapy  · Sudden cardiac death risk reduction:  · Not candidate for ICD/Life Vest candidacy due to DNR/DNI  · Of note, patient has known hx of LBBB with QRS around 120ms    · Cardiac diet, sodium restriction <2g, fluid restriction <1 5L  · Daily I&Os  · MAP goal >65   · Consulted heart failure service given newly decreased ejection fraction, appreciate recs  -Discontinued metoprolol due to wheezing  -Continue losartan 12 5 mg  -Plan to start MRA in the future  -Outpatient cardiology/heart failure follow-up
· NYHA class: III  / ACC/AHA stage: C; in setting of very severe COPD (FEV1 22%)  · Follows with Dr Lakisha Hernández as an outpatient  · EF 40% PTA on lasix 20mg qd at home  · TTE on admission showing EF 20-25% with abnormal septal motion in setting of chronic LBBB, RVSP 38, mildly dilated RV  · Clinical presentation on arrival consistent with biventricular heart failure in setting of AECOPD  · Repeated TTE on 2/21 to reassess EF showed EF 30%  · Continue PO Lasix 40mg daily  · Goal-directed medical therapy  · Sudden cardiac death risk reduction:  · Not candidate for ICD/Life Vest candidacy due to DNR/DNI  · Of note, patient has known hx of LBBB with QRS around 120ms    · Cardiac diet, sodium restriction <2g, fluid restriction <1 5L  · Daily I&Os  · MAP goal >65   · Consulted heart failure service given newly decreased ejection fraction, appreciate recs  -Discontinued metoprolol due to wheezing  -Continue losartan 12 5 mg  -Plan to start MRA in the future  -Outpatient cardiology/heart failure follow-up
· NYHA class: III  / ACC/AHA stage: C; in setting of very severe COPD (FEV1 22%)  · Follows with Dr Maribell Perez as an outpatient  · EF 40% PTA on lasix 20mg qd at home  · TTE on admission showing EF 20-25% with abnormal septal motion in setting of chronic LBBB, RVSP 38, mildly dilated RV  · Clinical presentation on arrival consistent with biventricular heart failure in setting of AECOPD    · Cont po lasix 40mg daily  · Goal-directed medical therapy: Beta-blocker concern due to blood pressure and end-stage COPD  · Sudden cardiac death risk reduction:  · ICD/Life Vest candidacy: can be a candidate if repeat TTE shows EF<35%  · Of note, patient has known hx of LBBB with QRS around 120ms    · Cardiac diet, sodium restriction <2g, fluid restriction <1 5L  · Daily I&Os  · Consider repeat TTE in future to assess for improvement in systolic function  · MAP goal >65   · Consulted heart failure service given newly decreased ejection fraction, appreciate recs
· No overt signs of GI bleed  · Iron panel suggesting mixed iron deficiency with likely anemia of chronic disease contribution  · Retic index   74 suggesting hypoproliferation  · Elevated MCV  · Continue ferrous sulfate supplementation   · Trend CBC, transfuse if Hgb drops below 7  · Given patient has signs of numbness/tingling on the tip of the fingers, was started on vitamin B12 supplementation  · On DVT ppx
· No overt signs of GI bleed  · Iron panel suggesting mixed iron deficiency with likely anemia of chronic disease contribution  · Retic index   74 suggesting hypoproliferation  · Elevated MCV  · Continue ferrous sulfate supplementation   · Trend CBC, transfuse if Hgb drops below 7  · Given patient has signs of numbness/tingling on the tip of the fingers, was started on vitamin B12 supplementation  · On DVT ppx   · CBC in AM
· No overt signs of GI bleed  · Iron panel suggesting mixed iron deficiency with likely anemia of chronic disease contribution  · Retic index   74 suggesting hypoproliferation  · Elevated MCV  · Continue ferrous sulfate supplementation   · Trend CBC, transfuse if Hgb drops below 7  · Given patient has signs of numbness/tingling on the tip of the fingers, was started on vitamin B12 supplementation  · On DVT ppx   · CBC in AM
· Patient noted to be cachectic/ill-appearing    · 2/2 catabolic illness/COPD   · Continue Ensure supplementations with daily meals
· Patient tested positive for COVID on 2/22/2023  Patient on 3L nasal cannula  · Monitor WBC and fever curve  · Continue oxygen segmentation to maintain SPO2 >88%  · Pulm re-evaluated- they recommended to continue current steroid taper, consider antiviral (s/p remdesivir x 3 days per protocol given patient's risk factors: age, chronic heart and lung disease), no need for escalated anti-inflammatory per Pulm 
· Patient tested positive for COVID on 2/22/2023  Patient on 3L nasal cannula  · Monitor WBC and fever curve  · Continue oxygen segmentation to maintain SPO2 >88%  · Pulm re-evaluated- they recommended to continue current steroid taper, consider antiviral (s/p remdesivir x 3 days per protocol given patient's risk factors: age, chronic heart and lung disease), no need for escalated anti-inflammatory per Pulm  · CXR revealed new bibasilar opacity, L>R  · Procal negative x2  Abx discontinued per Pulm recs 
· Patient tested positive for COVID on 2/22/2023  Patient on 3L nasal cannula  · Monitor WBC and fever curve  · Continue oxygen segmentation to maintain SPO2 >88%  · Pulm re-evaluated- they recommended to continue current steroid taper, consider antiviral (started remdesivir x 3 days (today is day #3) per protocol given patient's risk factors: age, chronic heart and lung disease), no need for escalated anti-inflammatory per Pulm  · CXR revealed new bibasilar opacity, L>R  · Procal negative x2  Abx discontinued per Pulm recs 
· Patient tested positive for COVID on 2/22/2023  Patient on 4L nasal cannula  · Monitor WBC and fever curve  · Continue oxygen segmentation to maintain SPO2 >88%  · Pulm re-evaluated, I discussed with them today - they recommended to continue current steroid taper, consider antiviral (remdesivir ordered x 3 days per protocol given patient's risk factors: age, chronic heart and lung disease), no need for escalated anti-inflammatory per Pulm  · CXR revealed new bibasilar opacity, L>R  · Procal negative  Abx discontinued per Pulm recs 
· Patient tested positive for COVID on 2/22/2023  Patient on 4L nasal cannula  · Monitor WBC and fever curve  · Continue oxygen segmentation to maintain SPO2 >88%  · Pulm re-evaluated- they recommended to continue current steroid taper, consider antiviral (started remdesivir x 3 days per protocol given patient's risk factors: age, chronic heart and lung disease), no need for escalated anti-inflammatory per Pulm  · CXR revealed new bibasilar opacity, L>R  · Procal negative x2  Abx discontinued per Pulm recs 
· Uses BiPAP at home  · Continue BiPAP 12/6/40% qHs
no strength deficits were identified

## 2023-07-10 NOTE — PROGRESS NOTES
Outpatient Virtual Regular Visit - Follow-up with Palliative and [address] [place] [address]. 79 y.o. male [id number]    Intake:    Reason for virtual visit is chronic HFrEF, chronic obstructive pulmonary disease, chronic respiratory failure. The patient is unable to visit the clinic safely due to acuity of condition. After connecting through [employer], sound difficulties led to telephonevisit. The patient was identified by name and date of birth. [employer] Speak. or their agent was informed that this was a telemedicine visit and that the visit is being conducted through the [employer]. He agrees to proceed. My office door was closed. No one else was in the room. They acknowledged consent and understanding of privacy and security of the video platform. The patient or agent has agreed to participate and understands they can discontinue the visit at any time. Assessment & Plan  Problem List Items Addressed This Visit        Respiratory    Chronic obstructive pulmonary disease with acute exacerbation ([address]) - Primary    Chronic respiratory failure with hypoxia and hypercapnia (HCC)       Cardiovascular and Mediastinum    Chronic HFrEF (heart failure with reduced ejection fraction) (HCC)       Genitourinary    Prostate cancer (HCC) (Chronic)       Other    Goals of care, counseling/discussion    Protein-calorie malnutrition ([address])    Physical deconditioning    Palliative care patient        Assessment/Plan:  1) Symptom Management  • Patient has declined any additional changes to medication regimen. Have discussed use of benzodiazepines for anxiety associated with air hunger in the past and patient is not interested.   ? Acetaminophen 975 mg PO q8 hrs PRN mild-moderate pain  ? Do not exceed 3000 mg daily  ? Voltaren gel 2 grams four times daily  ? Sennokot-S 8.6-50 mg qHS  ? Colace 100 mg PO BID  ?  Atarax 25 mg PO q6 hrs PRN anxiety - patient not using  • Patient has been counseled on tobacco cessation and educated about dangers of smoking with home oxygen. • Provided ER precautions  • He reports O2 saturation 85% on 3L at today's visit. Advised patient to increase oxygen, but he reports he is "okay at 3L" and does not wish to increase. Discussed ER precautions and advised him to continue to monitor. Denies any shortness of breath, pain, nausea, vomiting, dizziness, syncope.    2) Goals of Care  • Patient had been referred to hospice in the past, but did not pursue as he wishes to continue medical optimization and return for future hospitalizations. • At today's visit, he confirms that his goals remain the same as last visit. • Patient has been given advanced directives, but has not yet completed  • Goals of care discussions will be ongoing     3) Social Support  • Patient well-supported by his wife Desirae Rose and son Aiden Jovel  • Being at home as much as possible is of importance  • He enjoys building models in his free time  • Emotional support provided     4) Follow-Up  • Patient will follow-up in 8 weeks or sooner if needed. Please call with any questions or concerns.   • Encouraged patient to continue to follow with specialists and other providers as his goals are medical optimization as much as possible  • His next appointment is with pulmonology on 7/19    Medications adjusted this encounter:  Requested Prescriptions      No prescriptions requested or ordered in this encounter     No orders of the defined types were placed in this encounter. There are no discontinued medications. Andrei Ugarte. was seen today for symptoms and planning cares related to above illnesses. I have reviewed the patient's controlled substance dispensing history in the Prescription Drug Monitoring Program in compliance with the [facility] regulations before prescribing any controlled substances. No refills    They are invited to continue to follow with us.   If there are questions or concerns, please contact us through our clinic/answering service 24 hours a day, seven days a week. Dori Barber PA-C  [facility]  [phone]        Visit Information    Accompanied By: Spouse    Source of History: Self, Spouse, Medical record    History Limitations: None    Contacts:Contact Information:  Name: Say Mcghee (Spouse) [phone]    Chief Complaint  No chief complaint on file. History of Present Illness      Felipa Bruno Jr. is a 79 y. o. male who presents in follow up of symptoms related to acute on chronic hypercapnic and hypoxic respiratory failure, end-stage COPD, heart failure with reduced EF. He was recently hospitalized from 4/30-5/10 and 6/1-6/12 due to acute exacerbation of COPD. He has had numerous exacerbations and hospitalizations over the past several months. During these admissions, he wished for "ongoing medical optimization aimed towards efforts of functional recovery as able, without limits." During goals of care conversations during these hospitalizations, hospice has been introduced and patient does not feel it is the right time for hospice and wants to continue current level of medical optimization including return to hospital if indicated. Pertinent issues include: symptom management, assessment of goals of care, advance care planning     Since last visit, patient has had follow up visit with cardiology. It was recommended that he continue to follow with cardiology and pulmonology, however he is not looking to pursue invasive measures. He remains full code. Upon my encounter today, patient presents with his son Brian Castrejon over televideo. Sound difficulties led to completing visit over telephone. Patient states he has been doing very well since last visit. He states his oxygen remains in the 85-91% range and he feels short of breath occasionally. He is able to walk short distances.  He has been trying to obtain a mobilized scooter and provided him with the phone number for Keri Felipe that was left in his voicemail by W. He states that he and his wife will call. He takes his pulse ox during visit which is 85% at 3L currently. Advised patient to increase oxygen and he states he is "okay at  3L." He is aware to continue to monitor and go to ER if pulse oxygenation drops further or he develops worsening symptoms. Discussed ER precautions with him. He denies any symptoms at this time including shortness of breath, pain, nausea, vomiting, dizziness, syncope, constipation, diarrhea, difficulty urinating. He does have frequency of urination due to diuretics. Appetite is poor, but stable. He has been focusing on increasing protein intake and drinks ensure shakes. Discussed goals of care and patient's goals remain clear that he wishes to pursue medical optimization and future hospitalizations if necessary. He also does not wish for any changes to his medications at today's visit. Denies any other questions or concerns. He will follow up with palliative medicine in approx 2 months or sooner if needed. Provided palliative medicine phone number if he has any additional questions or concerns. Patient is appreciative of call.       Past Medical History:   Diagnosis Date   • Acute metabolic encephalopathy 6/81/1391   • Arthritis    • Bladder mass    • Cardiomyopathy [facility])    • Chest pain    • COPD (chronic obstructive pulmonary disease) (HCC)    • COVID-19 virus infection 2/23/2023   • CPAP (continuous positive airway pressure) dependence    • Emphysema of lung (HCC)    • Hypoxia     nocturnal   • Left bundle branch block    • Multiple pulmonary nodules     last assessed: 10/12/16   • Pneumonia    • Sleep apnea    • Sleep apnea, obstructive    • Smoker    • Weight loss 8/29/2019     Past Surgical History:   Procedure Laterality Date   • COLONOSCOPY     • CYSTOSCOPY     • CYSTOSCOPY  02/17/2021   • MS BLADDER INSTILLATION ANTICARCINOGENIC AGENT N/A 1/3/2020    Procedure: INSTILLATION MITOMYCIN;  Surgeon: Helena Azevedo MD;  Location: [facility];  Service: Urology   • UT CYSTO W/REMOVAL OF LESIONS SMALL N/A 1/3/2020    Procedure: TRANSURETHRAL RESECTION OF BLADDER TUMOR (TURBT);   Surgeon: Helena Azevedo MD;  [facility];  Service: Urology   • UT CYSTOURETHROSCOPY WITH BIOPSY N/A 4/13/2021    Procedure: CYSTOSCOPY WITH BIOPSIES;  Surgeon: Helena Azevedo MD;  [facility];  Service: Urology   • UT TRURL ELECTROSURG [address] PROSTATE BLEED COMPLETE N/A 4/13/2021    Procedure: TRANSURETHRAL RESECTION OF PROSTATE (TURP) BLADDER BIOPSY, FULGURATION;  Surgeon: Helena Azevedo MD;  [facility];  Service: Urology     Current Outpatient Medications   Medication Sig Dispense Refill   • acetaminophen (TYLENOL) 325 mg tablet Take 3 tablets (975 mg total) by mouth every 8 (eight) hours as needed for mild pain or moderate pain  0   • albuterol (PROVENTIL HFA,VENTOLIN HFA) 90 mcg/act inhaler Inhale 2 puffs every 6 (six) hours as needed for wheezing 8.5 g 2   • aspirin 81 mg chewable tablet Chew 1 tablet (81 mg total) daily 30 tablet 0   • budesonide (PULMICORT) 0.5 mg/2 mL nebulizer solution TAKE 2 ML (0.5 MG TOTAL) BY NEBULIZATION TWICE A DAY RINSE MOUTH AFTER  mL 2   • chlorhexidine (PERIDEX) 0.12 % solution Apply 15 mL to the mouth or throat every 12 (twelve) hours 120 mL 0   • cyanocobalamin (VITAMIN B-12) 1000 MCG tablet Take 1 tablet (1,000 mcg total) by mouth daily 30 tablet 0   • Diclofenac Sodium (VOLTAREN) 1 % APPLY 2 GRAMS 4 TIMES A DAY     • docusate sodium (COLACE) 100 mg capsule Take 100 mg by mouth 2 (two) times a day (Patient not taking: Reported on 6/26/2023)     • ergocalciferol (VITAMIN D2) 50,000 units TAKE 1 CAPSULE (50,000 UNITS TOTAL) BY MOUTH EVERY 28 DAYS 3 capsule 1   • formoterol (PERFOROMIST) 20 MCG/2ML nebulizer solution TAKE 2 ML (20 MCG TOTAL) BY NEBULIZATION 2 (TWO) TIMES A DAY     • guaiFENesin (MUCINEX) 600 mg 12 hr tablet Take 2 tablets (1,200 mg total) by mouth every 12 (twelve) hours 60 tablet 6   • hydrOXYzine HCL (ATARAX) 25 mg tablet Take 1 tablet (25 mg total) by mouth every 6 (six) hours as needed for anxiety (Patient not taking: Reported on 6/16/2023) 30 tablet 0   • ipratropium-albuterol (DUO-NEB) 0.5-2.5 mg/3 mL nebulizer solution Take 3 mL by nebulization 4 (four) times a day 360 mL 11   • Melatonin 5 MG TABS Take 1 tablet by mouth daily at bedtime     • predniSONE 10 mg tablet Take 4 tablets (40 mg total) by mouth daily Reduce dose by 10 mg every 5 days  until you reach your regular dosing of steroids 5 mg daily per your lung doctro Do not start before June 13, 2023. 50 tablet 0   • rosuvastatin (CRESTOR) 10 MG tablet Take 1 tablet (10 mg total) by mouth daily 90 tablet 3   • sodium chloride (OCEAN) 0.65 % nasal spray 1 spray into each nostril every hour as needed for congestion 30 mL 0   • tamsulosin (FLOMAX) 0.4 mg Take 1 capsule (0.4 mg total) by mouth daily with dinner  0   • torsemide (DEMADEX) 20 mg tablet Take 1 tablet (20 mg total) by mouth daily 90 tablet 3     No current facility-administered medications for this visit. No Known Allergies    Review of Systems   All other systems reviewed and are negative. Video Exam  There were no vitals filed for this visit. Physical Exam  Constitutional:       General: He is not in acute distress. Appearance: Normal appearance. He is not diaphoretic. HENT:      Head: Normocephalic and atraumatic. Right Ear: External ear normal.      Left Ear: External ear normal.      Nose: Nose normal.      Mouth/Throat:      Mouth: Mucous membranes are moist.   Eyes:      Conjunctiva/sclera: Conjunctivae normal.   Pulmonary:      Effort: Pulmonary effort is normal. No respiratory distress. Neurological:      General: No focal deficit present. Mental Status: He is alert and oriented to person, place, and time.    Psychiatric:         Mood and Affect: Mood normal. Behavior: Behavior normal.            I spent 20+ minutes was spent during this virtual visit by PHONE TELECOMMUNICATIONS, face to face with Catina Perez. with greater than 50% of the time spent in counseling or coordination of care including discussions of diagnostic results, impression, and recommendations, risks and benefits of treatment, instructions for disease self management, treatment instructions, follow up requirements, risk factors and risk reduction of disease, patient and family counseling/involvement in care and compliance with treatment regimen . All of the patient's or agent's questions were answered during this discussion. Encounter provider Michelle Veliz PA-C, located at:  [address] [facility] [address] [address] [phone]    Recent Visits  No visits were found meeting these conditions. Showing recent visits within past 7 days and meeting all other requirements  Future Appointments  No visits were found meeting these conditions. Showing future appointments within next 150 days and meeting all other requirements       VIRTUAL VISIT DISCLAIMER    Catina Perez. or their agent has consented to an online visit or consultation. They understands that the online visit is based solely on information provided by the patient or agent, and that, in the absence of a face-to-face physical evaluation by the physician, the diagnosis they receive is both limited and provisional in terms of accuracy and completeness. This is not intended to replace a full medical face-to-face evaluation by the physician. Catina Perez. or their agent understands and accepts these terms. The patient or their agent was informed this is a billable service.

## 2023-07-14 ENCOUNTER — TELEMEDICINE (OUTPATIENT)
Dept: PALLIATIVE MEDICINE | Facility: CLINIC | Age: 68
End: 2023-07-14

## 2023-07-14 DIAGNOSIS — J96.12 CHRONIC RESPIRATORY FAILURE WITH HYPOXIA AND HYPERCAPNIA (HCC): ICD-10-CM

## 2023-07-14 DIAGNOSIS — C61 PROSTATE CANCER (HCC): Chronic | ICD-10-CM

## 2023-07-14 DIAGNOSIS — E46 PROTEIN-CALORIE MALNUTRITION, UNSPECIFIED SEVERITY (HCC): ICD-10-CM

## 2023-07-14 DIAGNOSIS — I50.22 CHRONIC HFREF (HEART FAILURE WITH REDUCED EJECTION FRACTION) (HCC): ICD-10-CM

## 2023-07-14 DIAGNOSIS — J96.11 CHRONIC RESPIRATORY FAILURE WITH HYPOXIA AND HYPERCAPNIA (HCC): ICD-10-CM

## 2023-07-14 DIAGNOSIS — J44.1 CHRONIC OBSTRUCTIVE PULMONARY DISEASE WITH ACUTE EXACERBATION (HCC): Primary | ICD-10-CM

## 2023-07-14 DIAGNOSIS — R53.81 PHYSICAL DECONDITIONING: ICD-10-CM

## 2023-07-14 DIAGNOSIS — Z71.89 GOALS OF CARE, COUNSELING/DISCUSSION: ICD-10-CM

## 2023-07-14 DIAGNOSIS — Z51.5 PALLIATIVE CARE PATIENT: ICD-10-CM

## 2023-07-19 ENCOUNTER — APPOINTMENT (EMERGENCY)
Dept: RADIOLOGY | Facility: HOSPITAL | Age: 68
DRG: 291 | End: 2023-07-19
Payer: COMMERCIAL

## 2023-07-19 ENCOUNTER — HOSPITAL ENCOUNTER (INPATIENT)
Facility: HOSPITAL | Age: 68
LOS: 2 days | Discharge: HOME | DRG: 291 | End: 2023-07-21
Attending: EMERGENCY MEDICINE | Admitting: INTERNAL MEDICINE
Payer: COMMERCIAL

## 2023-07-19 DIAGNOSIS — I50.43 ACUTE ON CHRONIC COMBINED SYSTOLIC AND DIASTOLIC HEART FAILURE (HCC): ICD-10-CM

## 2023-07-19 DIAGNOSIS — K59.00 CONSTIPATION: ICD-10-CM

## 2023-07-19 DIAGNOSIS — I50.23 ACUTE ON CHRONIC SYSTOLIC CONGESTIVE HEART FAILURE (HCC): ICD-10-CM

## 2023-07-19 DIAGNOSIS — J44.1 COPD WITH ACUTE EXACERBATION (HCC): ICD-10-CM

## 2023-07-19 DIAGNOSIS — R09.02 HYPOXIA: ICD-10-CM

## 2023-07-19 DIAGNOSIS — E43 SEVERE PROTEIN-CALORIE MALNUTRITION (HCC): ICD-10-CM

## 2023-07-19 DIAGNOSIS — J44.1 COPD WITH ACUTE EXACERBATION (HCC): Primary | ICD-10-CM

## 2023-07-19 DIAGNOSIS — J44.9 END STAGE COPD (HCC): ICD-10-CM

## 2023-07-19 DIAGNOSIS — E87.1 HYPONATREMIA: ICD-10-CM

## 2023-07-19 DIAGNOSIS — I50.23 ACUTE ON CHRONIC HFREF (HEART FAILURE WITH REDUCED EJECTION FRACTION) (HCC): ICD-10-CM

## 2023-07-19 DIAGNOSIS — Z51.5 PALLIATIVE CARE PATIENT: ICD-10-CM

## 2023-07-19 DIAGNOSIS — E87.3 ALKALOSIS: ICD-10-CM

## 2023-07-19 DIAGNOSIS — I50.22 CHRONIC HFREF (HEART FAILURE WITH REDUCED EJECTION FRACTION) (HCC): ICD-10-CM

## 2023-07-19 DIAGNOSIS — R06.02 SOB (SHORTNESS OF BREATH): ICD-10-CM

## 2023-07-19 DIAGNOSIS — J96.00 ACUTE RESPIRATORY FAILURE, UNSPECIFIED WHETHER WITH HYPOXIA OR HYPERCAPNIA (HCC): ICD-10-CM

## 2023-07-19 DIAGNOSIS — Z79.52 CURRENT USE OF STEROID MEDICATION: ICD-10-CM

## 2023-07-19 DIAGNOSIS — J44.1 ACUTE EXACERBATION OF CHRONIC OBSTRUCTIVE PULMONARY DISEASE (COPD) (HCC): ICD-10-CM

## 2023-07-19 PROBLEM — J96.02 ACUTE HYPERCAPNIC RESPIRATORY FAILURE (HCC): Status: ACTIVE | Noted: 2023-07-19

## 2023-07-19 LAB
2HR DELTA HS TROPONIN: -1 NG/L
4HR DELTA HS TROPONIN: 2 NG/L
ALBUMIN SERPL BCP-MCNC: 3.5 G/DL (ref 3.5–5)
ALP SERPL-CCNC: 64 U/L (ref 46–116)
ALT SERPL W P-5'-P-CCNC: 23 U/L (ref 12–78)
APTT PPP: 19 SECONDS (ref 23–37)
AST SERPL W P-5'-P-CCNC: 43 U/L (ref 5–45)
ATRIAL RATE: 98 BPM
BASE EX.OXY STD BLDV CALC-SCNC: 62.2 % (ref 60–80)
BASE EXCESS BLDV CALC-SCNC: 16.5 MMOL/L
BASOPHILS # BLD AUTO: 0 THOUSANDS/ÂΜL (ref 0–0.1)
BASOPHILS # BLD AUTO: 0.01 THOUSANDS/ÂΜL (ref 0–0.1)
BASOPHILS NFR BLD AUTO: 0 % (ref 0–1)
BASOPHILS NFR BLD AUTO: 0 % (ref 0–1)
BILIRUB SERPL-MCNC: 0.82 MG/DL (ref 0.2–1)
BNP SERPL-MCNC: 533 PG/ML (ref 0–100)
BUN SERPL-MCNC: 14 MG/DL (ref 5–25)
BUN SERPL-MCNC: 16 MG/DL (ref 5–25)
CALCIUM SERPL-MCNC: 8.8 MG/DL (ref 8.3–10.1)
CALCIUM SERPL-MCNC: 9 MG/DL (ref 8.3–10.1)
CARDIAC TROPONIN I PNL SERPL HS: 37 NG/L
CARDIAC TROPONIN I PNL SERPL HS: 38 NG/L
CARDIAC TROPONIN I PNL SERPL HS: 40 NG/L
CHLORIDE SERPL-SCNC: 70 MMOL/L (ref 96–108)
CHLORIDE SERPL-SCNC: 71 MMOL/L (ref 96–108)
CO2 SERPL-SCNC: >45 MMOL/L (ref 21–32)
CO2 SERPL-SCNC: >45 MMOL/L (ref 21–32)
CREAT SERPL-MCNC: 0.76 MG/DL (ref 0.6–1.3)
CREAT SERPL-MCNC: 0.77 MG/DL (ref 0.6–1.3)
EOSINOPHIL # BLD AUTO: 0 THOUSAND/ÂΜL (ref 0–0.61)
EOSINOPHIL # BLD AUTO: 0.01 THOUSAND/ÂΜL (ref 0–0.61)
EOSINOPHIL NFR BLD AUTO: 0 % (ref 0–6)
EOSINOPHIL NFR BLD AUTO: 0 % (ref 0–6)
ERYTHROCYTE [DISTWIDTH] IN BLOOD BY AUTOMATED COUNT: 13 % (ref 11.6–15.1)
ERYTHROCYTE [DISTWIDTH] IN BLOOD BY AUTOMATED COUNT: 13.2 % (ref 11.6–15.1)
FLUAV RNA RESP QL NAA+PROBE: NEGATIVE
FLUBV RNA RESP QL NAA+PROBE: NEGATIVE
GFR SERPL CREATININE-BSD FRML MDRD: 94 ML/MIN/1.73SQ M
GFR SERPL CREATININE-BSD FRML MDRD: 95 ML/MIN/1.73SQ M
GLUCOSE SERPL-MCNC: 144 MG/DL (ref 65–140)
GLUCOSE SERPL-MCNC: 190 MG/DL (ref 65–140)
GLUCOSE SERPL-MCNC: 213 MG/DL (ref 65–140)
GLUCOSE SERPL-MCNC: 243 MG/DL (ref 65–140)
GLUCOSE SERPL-MCNC: 246 MG/DL (ref 65–140)
GLUCOSE SERPL-MCNC: 273 MG/DL (ref 65–140)
HCO3 BLDV-SCNC: 46.5 MMOL/L (ref 24–30)
HCT VFR BLD AUTO: 36.1 % (ref 36.5–49.3)
HCT VFR BLD AUTO: 37.4 % (ref 36.5–49.3)
HGB BLD-MCNC: 12 G/DL (ref 12–17)
HGB BLD-MCNC: 12.6 G/DL (ref 12–17)
IMM GRANULOCYTES # BLD AUTO: 0.04 THOUSAND/UL (ref 0–0.2)
IMM GRANULOCYTES # BLD AUTO: 0.05 THOUSAND/UL (ref 0–0.2)
IMM GRANULOCYTES NFR BLD AUTO: 0 % (ref 0–2)
IMM GRANULOCYTES NFR BLD AUTO: 1 % (ref 0–2)
INR PPP: 0.78 (ref 0.84–1.19)
LYMPHOCYTES # BLD AUTO: 0.26 THOUSANDS/ÂΜL (ref 0.6–4.47)
LYMPHOCYTES # BLD AUTO: 0.89 THOUSANDS/ÂΜL (ref 0.6–4.47)
LYMPHOCYTES NFR BLD AUTO: 3 % (ref 14–44)
LYMPHOCYTES NFR BLD AUTO: 8 % (ref 14–44)
MCH RBC QN AUTO: 31.7 PG (ref 26.8–34.3)
MCH RBC QN AUTO: 32 PG (ref 26.8–34.3)
MCHC RBC AUTO-ENTMCNC: 33.2 G/DL (ref 31.4–37.4)
MCHC RBC AUTO-ENTMCNC: 33.7 G/DL (ref 31.4–37.4)
MCV RBC AUTO: 95 FL (ref 82–98)
MCV RBC AUTO: 96 FL (ref 82–98)
MONOCYTES # BLD AUTO: 0.28 THOUSAND/ÂΜL (ref 0.17–1.22)
MONOCYTES # BLD AUTO: 1.37 THOUSAND/ÂΜL (ref 0.17–1.22)
MONOCYTES NFR BLD AUTO: 12 % (ref 4–12)
MONOCYTES NFR BLD AUTO: 3 % (ref 4–12)
NEUTROPHILS # BLD AUTO: 8.75 THOUSANDS/ÂΜL (ref 1.85–7.62)
NEUTROPHILS # BLD AUTO: 9.87 THOUSANDS/ÂΜL (ref 1.85–7.62)
NEUTS SEG NFR BLD AUTO: 79 % (ref 43–75)
NEUTS SEG NFR BLD AUTO: 94 % (ref 43–75)
NRBC BLD AUTO-RTO: 0 /100 WBCS
NRBC BLD AUTO-RTO: 0 /100 WBCS
O2 CT BLDV-SCNC: 11.1 ML/DL
OSMOLALITY UR/SERPL-RTO: 273 MMOL/KG (ref 282–298)
OSMOLALITY UR: 261 MMOL/KG
P AXIS: 105 DEGREES
PCO2 BLDV: 88.3 MM HG (ref 42–50)
PH BLDV: 7.34 [PH] (ref 7.3–7.4)
PLATELET # BLD AUTO: 159 THOUSANDS/UL (ref 149–390)
PLATELET # BLD AUTO: 204 THOUSANDS/UL (ref 149–390)
PLATELET BLD QL SMEAR: ADEQUATE
PMV BLD AUTO: 12.7 FL (ref 8.9–12.7)
PMV BLD AUTO: 9.1 FL (ref 8.9–12.7)
PO2 BLDV: 35 MM HG (ref 35–45)
POTASSIUM SERPL-SCNC: 3.3 MMOL/L (ref 3.5–5.3)
POTASSIUM SERPL-SCNC: 3.3 MMOL/L (ref 3.5–5.3)
PR INTERVAL: 184 MS
PROCALCITONIN SERPL-MCNC: 0.05 NG/ML
PROT SERPL-MCNC: 6.3 G/DL (ref 6.4–8.4)
PROTHROMBIN TIME: 11.1 SECONDS (ref 11.6–14.5)
QRS AXIS: 91 DEGREES
QRSD INTERVAL: 142 MS
QT INTERVAL: 378 MS
QTC INTERVAL: 482 MS
RBC # BLD AUTO: 3.78 MILLION/UL (ref 3.88–5.62)
RBC # BLD AUTO: 3.94 MILLION/UL (ref 3.88–5.62)
RBC MORPH BLD: NORMAL
RSV RNA RESP QL NAA+PROBE: NEGATIVE
SARS-COV-2 RNA RESP QL NAA+PROBE: NEGATIVE
SODIUM 24H UR-SCNC: 38 MOL/L
SODIUM SERPL-SCNC: 123 MMOL/L (ref 135–147)
SODIUM SERPL-SCNC: 123 MMOL/L (ref 135–147)
T WAVE AXIS: 79 DEGREES
TSH SERPL DL<=0.05 MIU/L-ACNC: 1.39 UIU/ML (ref 0.45–4.5)
VENTRICULAR RATE: 98 BPM
WBC # BLD AUTO: 10.45 THOUSAND/UL (ref 4.31–10.16)
WBC # BLD AUTO: 11.08 THOUSAND/UL (ref 4.31–10.16)

## 2023-07-19 PROCEDURE — 80053 COMPREHEN METABOLIC PANEL: CPT

## 2023-07-19 PROCEDURE — 93005 ELECTROCARDIOGRAM TRACING: CPT

## 2023-07-19 PROCEDURE — 83880 ASSAY OF NATRIURETIC PEPTIDE: CPT | Performed by: NURSE PRACTITIONER

## 2023-07-19 PROCEDURE — 92610 EVALUATE SWALLOWING FUNCTION: CPT

## 2023-07-19 PROCEDURE — 94664 DEMO&/EVAL PT USE INHALER: CPT

## 2023-07-19 PROCEDURE — 84300 ASSAY OF URINE SODIUM: CPT | Performed by: INTERNAL MEDICINE

## 2023-07-19 PROCEDURE — 84145 PROCALCITONIN (PCT): CPT | Performed by: INTERNAL MEDICINE

## 2023-07-19 PROCEDURE — 99285 EMERGENCY DEPT VISIT HI MDM: CPT

## 2023-07-19 PROCEDURE — 0241U HB NFCT DS VIR RESP RNA 4 TRGT: CPT | Performed by: INTERNAL MEDICINE

## 2023-07-19 PROCEDURE — 80048 BASIC METABOLIC PNL TOTAL CA: CPT | Performed by: NURSE PRACTITIONER

## 2023-07-19 PROCEDURE — 99254 IP/OBS CNSLTJ NEW/EST MOD 60: CPT | Performed by: NURSE PRACTITIONER

## 2023-07-19 PROCEDURE — 96374 THER/PROPH/DIAG INJ IV PUSH: CPT

## 2023-07-19 PROCEDURE — 94760 N-INVAS EAR/PLS OXIMETRY 1: CPT

## 2023-07-19 PROCEDURE — 85730 THROMBOPLASTIN TIME PARTIAL: CPT | Performed by: INTERNAL MEDICINE

## 2023-07-19 PROCEDURE — 94640 AIRWAY INHALATION TREATMENT: CPT

## 2023-07-19 PROCEDURE — 85025 COMPLETE CBC W/AUTO DIFF WBC: CPT

## 2023-07-19 PROCEDURE — 94660 CPAP INITIATION&MGMT: CPT

## 2023-07-19 PROCEDURE — 97163 PT EVAL HIGH COMPLEX 45 MIN: CPT

## 2023-07-19 PROCEDURE — 83935 ASSAY OF URINE OSMOLALITY: CPT | Performed by: INTERNAL MEDICINE

## 2023-07-19 PROCEDURE — 82948 REAGENT STRIP/BLOOD GLUCOSE: CPT

## 2023-07-19 PROCEDURE — 84443 ASSAY THYROID STIM HORMONE: CPT | Performed by: INTERNAL MEDICINE

## 2023-07-19 PROCEDURE — 85610 PROTHROMBIN TIME: CPT | Performed by: INTERNAL MEDICINE

## 2023-07-19 PROCEDURE — 97167 OT EVAL HIGH COMPLEX 60 MIN: CPT

## 2023-07-19 PROCEDURE — 84484 ASSAY OF TROPONIN QUANT: CPT

## 2023-07-19 PROCEDURE — 71045 X-RAY EXAM CHEST 1 VIEW: CPT

## 2023-07-19 PROCEDURE — 83930 ASSAY OF BLOOD OSMOLALITY: CPT | Performed by: INTERNAL MEDICINE

## 2023-07-19 PROCEDURE — 99024 POSTOP FOLLOW-UP VISIT: CPT | Performed by: INTERNAL MEDICINE

## 2023-07-19 PROCEDURE — 36415 COLL VENOUS BLD VENIPUNCTURE: CPT

## 2023-07-19 PROCEDURE — 99223 1ST HOSP IP/OBS HIGH 75: CPT | Performed by: STUDENT IN AN ORGANIZED HEALTH CARE EDUCATION/TRAINING PROGRAM

## 2023-07-19 PROCEDURE — 99254 IP/OBS CNSLTJ NEW/EST MOD 60: CPT | Performed by: INTERNAL MEDICINE

## 2023-07-19 PROCEDURE — 99223 1ST HOSP IP/OBS HIGH 75: CPT | Performed by: INTERNAL MEDICINE

## 2023-07-19 PROCEDURE — 99291 CRITICAL CARE FIRST HOUR: CPT | Performed by: EMERGENCY MEDICINE

## 2023-07-19 PROCEDURE — 94644 CONT INHLJ TX 1ST HOUR: CPT

## 2023-07-19 PROCEDURE — 93010 ELECTROCARDIOGRAM REPORT: CPT | Performed by: INTERNAL MEDICINE

## 2023-07-19 PROCEDURE — 82805 BLOOD GASES W/O2 SATURATION: CPT

## 2023-07-19 PROCEDURE — 85025 COMPLETE CBC W/AUTO DIFF WBC: CPT | Performed by: INTERNAL MEDICINE

## 2023-07-19 RX ORDER — FUROSEMIDE 10 MG/ML
40 INJECTION INTRAMUSCULAR; INTRAVENOUS
Status: COMPLETED | OUTPATIENT
Start: 2023-07-19 | End: 2023-07-20

## 2023-07-19 RX ORDER — INSULIN LISPRO 100 [IU]/ML
1-5 INJECTION, SOLUTION INTRAVENOUS; SUBCUTANEOUS
Status: DISCONTINUED | OUTPATIENT
Start: 2023-07-19 | End: 2023-07-21 | Stop reason: HOSPADM

## 2023-07-19 RX ORDER — POTASSIUM CHLORIDE 14.9 MG/ML
20 INJECTION INTRAVENOUS ONCE
Status: COMPLETED | OUTPATIENT
Start: 2023-07-19 | End: 2023-07-19

## 2023-07-19 RX ORDER — ASPIRIN 81 MG/1
81 TABLET, CHEWABLE ORAL DAILY
Status: DISCONTINUED | OUTPATIENT
Start: 2023-07-19 | End: 2023-07-21 | Stop reason: HOSPADM

## 2023-07-19 RX ORDER — LEVALBUTEROL INHALATION SOLUTION 1.25 MG/3ML
1.25 SOLUTION RESPIRATORY (INHALATION)
Status: DISCONTINUED | OUTPATIENT
Start: 2023-07-19 | End: 2023-07-21 | Stop reason: HOSPADM

## 2023-07-19 RX ORDER — MAGNESIUM SULFATE 1 G/100ML
1 INJECTION INTRAVENOUS ONCE
Status: COMPLETED | OUTPATIENT
Start: 2023-07-19 | End: 2023-07-19

## 2023-07-19 RX ORDER — INSULIN LISPRO 100 [IU]/ML
1-5 INJECTION, SOLUTION INTRAVENOUS; SUBCUTANEOUS EVERY 6 HOURS SCHEDULED
Status: DISCONTINUED | OUTPATIENT
Start: 2023-07-19 | End: 2023-07-19

## 2023-07-19 RX ORDER — ERGOCALCIFEROL 1.25 MG/1
50000 CAPSULE ORAL
Status: DISCONTINUED | OUTPATIENT
Start: 2023-07-19 | End: 2023-07-21 | Stop reason: HOSPADM

## 2023-07-19 RX ORDER — METHYLPREDNISOLONE SODIUM SUCCINATE 40 MG/ML
40 INJECTION, POWDER, LYOPHILIZED, FOR SOLUTION INTRAMUSCULAR; INTRAVENOUS EVERY 8 HOURS SCHEDULED
Status: DISCONTINUED | OUTPATIENT
Start: 2023-07-19 | End: 2023-07-20

## 2023-07-19 RX ORDER — POTASSIUM CHLORIDE 20 MEQ/1
20 TABLET, EXTENDED RELEASE ORAL 2 TIMES DAILY
Status: DISCONTINUED | OUTPATIENT
Start: 2023-07-19 | End: 2023-07-21 | Stop reason: HOSPADM

## 2023-07-19 RX ORDER — ACETAMINOPHEN 325 MG/1
650 TABLET ORAL EVERY 6 HOURS PRN
Status: DISCONTINUED | OUTPATIENT
Start: 2023-07-19 | End: 2023-07-21 | Stop reason: HOSPADM

## 2023-07-19 RX ORDER — ALBUTEROL SULFATE 2.5 MG/3ML
SOLUTION RESPIRATORY (INHALATION)
Status: DISPENSED
Start: 2023-07-19 | End: 2023-07-19

## 2023-07-19 RX ORDER — BUDESONIDE 0.5 MG/2ML
0.5 INHALANT ORAL
Status: DISCONTINUED | OUTPATIENT
Start: 2023-07-19 | End: 2023-07-21 | Stop reason: HOSPADM

## 2023-07-19 RX ORDER — AZITHROMYCIN 250 MG/1
500 TABLET, FILM COATED ORAL EVERY 24 HOURS
Status: DISCONTINUED | OUTPATIENT
Start: 2023-07-19 | End: 2023-07-21 | Stop reason: HOSPADM

## 2023-07-19 RX ORDER — FUROSEMIDE 10 MG/ML
40 INJECTION INTRAMUSCULAR; INTRAVENOUS
Status: DISCONTINUED | OUTPATIENT
Start: 2023-07-19 | End: 2023-07-19

## 2023-07-19 RX ORDER — IPRATROPIUM BROMIDE AND ALBUTEROL SULFATE 2.5; .5 MG/3ML; MG/3ML
3 SOLUTION RESPIRATORY (INHALATION) 3 TIMES DAILY
Status: DISCONTINUED | OUTPATIENT
Start: 2023-07-19 | End: 2023-07-19

## 2023-07-19 RX ORDER — METHYLPREDNISOLONE SODIUM SUCCINATE 125 MG/2ML
80 INJECTION, POWDER, LYOPHILIZED, FOR SOLUTION INTRAMUSCULAR; INTRAVENOUS ONCE
Status: COMPLETED | OUTPATIENT
Start: 2023-07-19 | End: 2023-07-19

## 2023-07-19 RX ORDER — GUAIFENESIN 600 MG/1
1200 TABLET, EXTENDED RELEASE ORAL EVERY 12 HOURS SCHEDULED
Status: DISCONTINUED | OUTPATIENT
Start: 2023-07-19 | End: 2023-07-21 | Stop reason: HOSPADM

## 2023-07-19 RX ORDER — HEPARIN SODIUM 5000 [USP'U]/ML
5000 INJECTION, SOLUTION INTRAVENOUS; SUBCUTANEOUS EVERY 8 HOURS SCHEDULED
Status: DISCONTINUED | OUTPATIENT
Start: 2023-07-19 | End: 2023-07-21 | Stop reason: HOSPADM

## 2023-07-19 RX ORDER — SODIUM CHLORIDE FOR INHALATION 0.9 %
3 VIAL, NEBULIZER (ML) INHALATION ONCE
Status: COMPLETED | OUTPATIENT
Start: 2023-07-19 | End: 2023-07-19

## 2023-07-19 RX ADMIN — ACETAMINOPHEN 650 MG: 325 TABLET, FILM COATED ORAL at 20:28

## 2023-07-19 RX ADMIN — POTASSIUM CHLORIDE 20 MEQ: 1500 TABLET, EXTENDED RELEASE ORAL at 17:12

## 2023-07-19 RX ADMIN — INSULIN LISPRO 1 UNITS: 100 INJECTION, SOLUTION INTRAVENOUS; SUBCUTANEOUS at 22:58

## 2023-07-19 RX ADMIN — BUDESONIDE 0.5 MG: 0.5 INHALANT ORAL at 07:46

## 2023-07-19 RX ADMIN — FUROSEMIDE 40 MG: 10 INJECTION, SOLUTION INTRAMUSCULAR; INTRAVENOUS at 09:49

## 2023-07-19 RX ADMIN — IPRATROPIUM BROMIDE 0.5 MG: 0.5 SOLUTION RESPIRATORY (INHALATION) at 21:39

## 2023-07-19 RX ADMIN — IPRATROPIUM BROMIDE 0.5 MG: 0.5 SOLUTION RESPIRATORY (INHALATION) at 07:46

## 2023-07-19 RX ADMIN — METHYLPREDNISOLONE SODIUM SUCCINATE 80 MG: 125 INJECTION, POWDER, FOR SOLUTION INTRAMUSCULAR; INTRAVENOUS at 02:48

## 2023-07-19 RX ADMIN — METHYLPREDNISOLONE SODIUM SUCCINATE 40 MG: 40 INJECTION, POWDER, FOR SOLUTION INTRAMUSCULAR; INTRAVENOUS at 21:07

## 2023-07-19 RX ADMIN — POTASSIUM CHLORIDE 20 MEQ: 14.9 INJECTION, SOLUTION INTRAVENOUS at 06:23

## 2023-07-19 RX ADMIN — GUAIFENESIN 1200 MG: 600 TABLET, EXTENDED RELEASE ORAL at 20:28

## 2023-07-19 RX ADMIN — HEPARIN SODIUM 5000 UNITS: 5000 INJECTION INTRAVENOUS; SUBCUTANEOUS at 06:23

## 2023-07-19 RX ADMIN — INSULIN LISPRO 2 UNITS: 100 INJECTION, SOLUTION INTRAVENOUS; SUBCUTANEOUS at 18:16

## 2023-07-19 RX ADMIN — AZITHROMYCIN MONOHYDRATE 500 MG: 250 TABLET ORAL at 17:12

## 2023-07-19 RX ADMIN — FUROSEMIDE 40 MG: 10 INJECTION, SOLUTION INTRAMUSCULAR; INTRAVENOUS at 17:12

## 2023-07-19 RX ADMIN — METHYLPREDNISOLONE SODIUM SUCCINATE 40 MG: 40 INJECTION, POWDER, FOR SOLUTION INTRAMUSCULAR; INTRAVENOUS at 09:45

## 2023-07-19 RX ADMIN — ERGOCALCIFEROL 50000 UNITS: 1.25 CAPSULE ORAL at 09:50

## 2023-07-19 RX ADMIN — GUAIFENESIN 1200 MG: 600 TABLET, EXTENDED RELEASE ORAL at 09:50

## 2023-07-19 RX ADMIN — HEPARIN SODIUM 5000 UNITS: 5000 INJECTION INTRAVENOUS; SUBCUTANEOUS at 14:05

## 2023-07-19 RX ADMIN — ASPIRIN 81 MG CHEWABLE TABLET 81 MG: 81 TABLET CHEWABLE at 09:50

## 2023-07-19 RX ADMIN — LEVALBUTEROL HYDROCHLORIDE 1.25 MG: 1.25 SOLUTION RESPIRATORY (INHALATION) at 21:40

## 2023-07-19 RX ADMIN — DOXYCYCLINE 100 MG: 100 INJECTION, POWDER, LYOPHILIZED, FOR SOLUTION INTRAVENOUS at 06:42

## 2023-07-19 RX ADMIN — IPRATROPIUM BROMIDE 1 MG: 0.5 SOLUTION RESPIRATORY (INHALATION) at 03:05

## 2023-07-19 RX ADMIN — IPRATROPIUM BROMIDE 0.5 MG: 0.5 SOLUTION RESPIRATORY (INHALATION) at 13:43

## 2023-07-19 RX ADMIN — MAGNESIUM SULFATE HEPTAHYDRATE 1 G: 1 INJECTION, SOLUTION INTRAVENOUS at 09:49

## 2023-07-19 RX ADMIN — ISODIUM CHLORIDE 3 ML: 0.03 SOLUTION RESPIRATORY (INHALATION) at 03:05

## 2023-07-19 RX ADMIN — INSULIN LISPRO 2 UNITS: 100 INJECTION, SOLUTION INTRAVENOUS; SUBCUTANEOUS at 14:00

## 2023-07-19 RX ADMIN — BUDESONIDE 0.5 MG: 0.5 INHALANT ORAL at 21:40

## 2023-07-19 RX ADMIN — LEVALBUTEROL HYDROCHLORIDE 1.25 MG: 1.25 SOLUTION RESPIRATORY (INHALATION) at 07:45

## 2023-07-19 RX ADMIN — HEPARIN SODIUM 5000 UNITS: 5000 INJECTION INTRAVENOUS; SUBCUTANEOUS at 21:06

## 2023-07-19 RX ADMIN — LEVALBUTEROL HYDROCHLORIDE 1.25 MG: 1.25 SOLUTION RESPIRATORY (INHALATION) at 13:42

## 2023-07-19 RX ADMIN — ALBUTEROL SULFATE 10 MG: 2.5 SOLUTION RESPIRATORY (INHALATION) at 03:04

## 2023-07-19 NOTE — CONSULTS
[address]. 79 y.o. male MRN: [id number]  Unit/Bed#: [facility] 510-01 Encounter: [id number]    ASSESSMENT and PLAN:  Acute on chronic hyponatremia:  · Etiology: Volume mediated, +/- SIADH. · Generally managed with fluid restriction (home fluid restriction at 2 L) and loop diuretic. Nutrition drinks also suggested but he is not taking them at home according to his son. Received tolvaptan during recent hospitalization. · Acute decline in sodium level may also be related to poor oral intake/low solute intake  · Acute decline in sodium to 123 on admission 7/19. · Received 1 dose of Lasix 40 mg IV  · Hypokalemia. Received a dose of IV potassium  · Work-up: Cortisol pending (Recent level 2.3 but level was drawn at the incorrect time), TSH normal.  Recent uric acid 2.3 consistent with SIADH. Recent serum osmolality low 272. · Plan/recommendations:  · Check urine sodium and urine osmolality. Urine sodium results will likely be skewed due to IV Lasix  · Modest fluid restriction  · Continue IV Lasix  · Try to boost solute intake with use of nutrition drink as tolerated  · Check BMP now-it has been approximately 8 hours since last blood work  · Check labs every 8 hours at this time. · Goal sodium level 127-129 in 24 hours    Hypokalemia:  · Status post dose of IV magnesium and 20 of potassium chloride IV piggyback x1  · Recheck BMP    Acute on chronic hypoxic/hypercapnic respiratory failure/end-stage COPD:  · Oxygen dependent-son reports that home oxygen is generally at 3 L  · Initially on BiPAP support, currently on nasal cannula  · VBG: pH 7.33  · Chest x-ray: Reviewed. Awaiting final read. Possibly mild central vascular congestion. · Continue IV diuretic, supportive care    Acute on chronic HFrEF:  · Ejection fraction 20%  · Continue IV diuretic.   Monitor response  · Stanley catheter in place    Anemia:  · History of iron deficiency  · Hemoglobin acceptable, 12.6    End-stage disease:  · Palliative following  · Goals of care discussion endorsed  · Severe cachexia, FTT  · Patient awakens briefly to voice. Asking to be undisturbed and allow    HISTORY OF PRESENT ILLNESS:  Requesting Physician: Steve Jackson MD  Reason for Consult: Hyponatremia    Andrei Becker is a 79 y.o. male with a past medical history of end-stage COPD, CHF, CAD and hyponatremia who was admitted to [facility] after presenting with change in appetite, confusion, increased respiratory distress. Jackie Pelaez was recently seen by our service in June 2023 for management of chronic hyponatremia. This felt to be related to volume overload and possibly SIADH. His son is at bedside along with his wife. They report that he has been eating Jell-O and soup. He has been following a fluid restriction of 2 L a day. He has increasing lower extremity edema but tends to sit all day with his legs in a dependent position. He sleeps at night sitting up. He is extremely weak and somnolent. Awakens briefly. Asks to be left undisturbed at this time. Extremely tired. A renal consultation is requested today for assistance in the management of hyponatremia.     PAST MEDICAL HISTORY:  Past Medical History:   Diagnosis Date   • Acute metabolic encephalopathy 7/13/6327   • Arthritis    • Bladder mass    • Cardiomyopathy [facility])    • Chest pain    • COPD (chronic obstructive pulmonary disease) ([address])    • COVID-19 virus infection 2/23/2023   • CPAP (continuous positive airway pressure) dependence    • Emphysema of lung (HCC)    • Hypoxia     nocturnal   • Left bundle branch block    • Multiple pulmonary nodules     last assessed: 10/12/16   • Pneumonia    • Sleep apnea    • Sleep apnea, obstructive    • Smoker    • Weight loss 8/29/2019       PAST SURGICAL HISTORY:  Past Surgical History:   Procedure Laterality Date   • COLONOSCOPY     • CYSTOSCOPY     • CYSTOSCOPY  02/17/2021   • MT BLADDER INSTILLATION ANTICARCINOGENIC AGENT N/A 1/3/2020    Procedure: Alina Zaragoza;  Surgeon: Sulaiman Pepe MD;  [facility]: [facility];  Service: Urology   • NV CYSTO W/REMOVAL OF LESIONS SMALL N/A 1/3/2020    Procedure: TRANSURETHRAL RESECTION OF BLADDER TUMOR (TURBT);   Surgeon: Sulaiman Pepe MD;  [facility];  Service: Urology   • NV CYSTOURETHROSCOPY WITH BIOPSY N/A 2021    Procedure: Taylor Garcia;  Surgeon: Sulaiman Pepe MD;  [facility];  Service: Urology   • NV TRURL ELECTROSURG [address] PROSTATE BLEED COMPLETE N/A 2021    Procedure: TRANSURETHRAL RESECTION OF PROSTATE (TURP) BLADDER BIOPSY, FULGURATION;  Surgeon: Sulaiman Pepe MD;  [facility];  Service: Urology       ALLERGIES:  No Known Allergies    SOCIAL HISTORY:  Social History     Substance and Sexual Activity   Alcohol Use Not Currently    Comment: rarely     Social History     Substance and Sexual Activity   Drug Use No     Social History     Tobacco Use   Smoking Status Former   • Packs/day: 2.50   • Years: 42.00   • Total pack years: 105.00   • Types: Cigarettes   • Start date:    • Quit date:    • Years since quittin.5   Smokeless Tobacco Former   Tobacco Comments    1 ppd for 37 years, 2010 down to 5 cigs a day, is around second hand smoke       FAMILY HISTORY:  Family History   Problem Relation Age of Onset   • Diabetes Mother        MEDICATIONS:    Current Facility-Administered Medications:   •  acetaminophen (TYLENOL) tablet 650 mg, 650 mg, Oral, Q6H PRN, Anjelica Cadet DO  •  albuterol (2.5 mg/3 mL) 0.083 % inhalation solution **ADS Override Pull**, , , ,   •  aspirin chewable tablet 81 mg, 81 mg, Oral, Daily, Anjelica Cadet DO, 81 mg at 23 0950  •  budesonide (PULMICORT) inhalation solution 0.5 mg, 0.5 mg, Nebulization, Q12H, Anjelica Cadet DO, 0.5 mg at 23 0746  •  doxycycline (VIBRAMYCIN) 100 mg in sodium chloride 0.9 % 100 mL IVPB, 100 mg, Intravenous, Q12H, Anjelica Cadet DO, Last Rate: 100 mL/hr at 07/19/23 0642, 100 mg at 07/19/23 0243  •  ergocalciferol (VITAMIN D2) capsule 50,000 Units, 50,000 Units, Oral, Q28 Days, Elktontello Floresuse, DO, 50,000 Units at 07/19/23 2944  •  furosemide (LASIX) injection 40 mg, 40 mg, Intravenous, BID (diuretic), Elkton Meuse, DO, 40 mg at 07/19/23 0949  •  guaiFENesin (MUCINEX) 12 hr tablet 1,200 mg, 1,200 mg, Oral, Q12H [facility], Elktontello Floresuse, DO, 1,200 mg at 07/19/23 0950  •  heparin (porcine) subcutaneous injection 5,000 Units, 5,000 Units, Subcutaneous, Q8H [facility], Guillermo Meuse, DO, 5,000 Units at 07/19/23 3652  •  insulin lispro (HumaLOG) 100 units/mL subcutaneous injection 1-5 Units, 1-5 Units, Subcutaneous, Q6H [facility] **AND** Fingerstick Glucose (POCT), , , Q6H, Elktontello Floresuse, DO  •  ipratropium (ATROVENT) 0.02 % inhalation solution **ADS Override Pull**, , , ,   •  ipratropium (ATROVENT) 0.02 % inhalation solution 0.5 mg, 0.5 mg, Nebulization, TID, Elkton Meuse, DO, 0.5 mg at 07/19/23 0746  •  levalbuterol (XOPENEX) inhalation solution 1.25 mg, 1.25 mg, Nebulization, TID, Elkton Meuse, DO, 1.25 mg at 07/19/23 0745  •  methylPREDNISolone sodium succinate (Solu-MEDROL) injection 40 mg, 40 mg, Intravenous, Q8H [facility], Elkton Meuse, DO, 40 mg at 07/19/23 0945    REVIEW OF SYSTEMS:  Constitutional: Positive for fatigue  HENT: Negative for postnasal drip no difficulty swallowing  Eyes: Negative for visual disturbance. Respiratory: Positive for cough and shortness of breath  Cardiovascular: Positive for leg swelling  Gastrointestinal: Negative for abdominal pain, constipation, diarrhea, nausea and vomiting. Genitourinary: No dysuria, hematuria   Musculoskeletal: Negative for unusual arthralgias  Skin: Negative for rash. Neurological: Negative for focal weakness  Hematological: Negative for bleeding  Psychiatric/Behavioral: Positive for confusion  All the systems were reviewed and were negative except as documented on the HPI.     PHYSICAL EXAM:  Current Weight:    First Weight:    Vitals:    23 0500 23 0553 23 0700 23 0750   BP:  125/56 94/55    Pulse: 80 88 83 70   Resp: 18 (!) 29 20 (!) 34   Temp:  (!) 97.3 °F (36.3 °C) 98.2 °F (36.8 °C)    TempSrc:   Oral    SpO2: 96% 100% 100% 99%     No intake or output data in the 24 hours ending 23 1057  Physical Exam  Constitutional:       Appearance: He is cachectic. He is ill-appearing. HENT:      Head: Normocephalic and atraumatic. Nose: Nose normal.      Mouth/Throat:      Mouth: Mucous membranes are moist.   Eyes:      General: No scleral icterus. Right eye: No discharge. Left eye: No discharge. Extraocular Movements: Extraocular movements intact. Conjunctiva/sclera: Conjunctivae normal.   Neck:      Vascular: No carotid bruit or JVD. Trachea: No tracheal deviation. Cardiovascular:      Rate and Rhythm: Normal rate. Rhythm irregular. Frequent Extrasystoles are present. Heart sounds: No murmur heard. No friction rub. No gallop. Pulmonary:      Effort: Respiratory distress present. Breath sounds: Wheezing and rales present. Abdominal:      General: Bowel sounds are normal. There is no distension. Palpations: Abdomen is soft. There is no mass. Tenderness: There is no abdominal tenderness. There is no guarding or rebound. Musculoskeletal:         General: No tenderness or signs of injury. Normal range of motion. Cervical back: Normal range of motion and neck supple. No rigidity or tenderness. Right lower le+ Pitting Edema present. Left lower le+ Pitting Edema present. Skin:     General: Skin is warm and dry. Coloration: Skin is not jaundiced or pale. Findings: No erythema or rash. Neurological:      General: No focal deficit present. Mental Status: He is lethargic.    Psychiatric:         Mood and Affect: Mood normal.       Invasive Devices:      Lab Results:   Results from last 7 days   Lab Units 07/19/23  0618 07/19/23  0245   WBC Thousand/uL 10.45* 11.08*   HEMOGLOBIN g/dL 12.6 12.0   HEMATOCRIT % 37.4 36.1*   PLATELETS Thousands/uL 159 204   POTASSIUM mmol/L  --  3.3*   CHLORIDE mmol/L  --  71*   CO2 mmol/L  --  >45*   BUN mg/dL  --  16   CREATININE mg/dL  --  0.77   CALCIUM mg/dL  --  8.8   ALK PHOS U/L  --  64   ALT U/L  --  23   AST U/L  --  43     Other Studies:

## 2023-07-19 NOTE — ASSESSMENT & PLAN NOTE
· Shortness of breath, wheezing, somnolence  · History of end-stage COPD, acute on chronic combined systolic and diastolic heart failure  · Severe cachexia, diffuse x-ray wheezing on exam, somnolent but arousable at times and then falls back asleep, bilateral pitting lower extremity edema  · Chest x-ray with evidence of enlarged lung fields consistent with underlying COPD, no obvious infiltrate or effusion per my read official read  · Tachypneic in ER room, CO2 on metabolic panel greater than 45, awaiting VBG which was ordered  · Spoke with patient as well as wife and son at bedside regarding my concerns with his end-stage COPD and clinical condition. We also discussed the patient has not been eating much recently and concerned that his end-stage COPD as well as end-stage heart failure have progressed. Patient, daughter and son at bedside understanding of concerns and after prolonged conversation in the ER, code status switched from full code to DNR/DNI. We also spoke about also care to focus on comfort only but patient as well as wife and son at bedside would like to hold off on this for now and have further discussions. · Admit to medicine on telemetry with BiPAP overnight. Will treat multifactorial respiratory failure with IV steroids, scheduled nebulizers, IV Lasix 40 mg twice daily. With respect to infection, patient afebrile and WBC 11 K. Suspicion for infection is lower on differential currently but as part of COPD treatment we will add doxycycline and order procalcitonin. If procalcitonin elevated can consider add addition of ceftriaxone. · Given patient's severe cachexia and weakness, and on BiPAP, n.p.o. We will consult speech for swallow eval.  · Spoke with patient as well as wife and son at bedside regarding continued discussions of goals of care and as such we will consult our palliative colleagues for their assistance on case this patient has previously been following with palliative care.

## 2023-07-19 NOTE — PLAN OF CARE
Problem: OCCUPATIONAL THERAPY ADULT  Goal: Performs self-care activities at highest level of function for planned discharge setting. See evaluation for individualized goals. Description: Treatment Interventions: ADL retraining, Functional transfer training, UE strengthening/ROM, Endurance training, Cognitive reorientation, Patient/family training, Equipment evaluation/education, Compensatory technique education, Continued evaluation, Energy conservation, Activityengagement          See flowsheet documentation for full assessment, interventions and recommendations. Note: Limitation: Decreased ADL status, Decreased Safe judgement during ADL, Decreased cognition, Decreased endurance, Decreased self-care trans, Decreased high-level ADLs  Prognosis: Fair  Assessment: Pt is a 78 yo male who presented to [facility] with increased somnolence, decreased appetitive, and SOB in which pt dx w/ acute hypercapnic respiratory failure. Pt  has a past medical history of Acute metabolic encephalopathy (1/22/2478), Arthritis, Bladder mass, Cardiomyopathy ([address]), Chest pain, COPD (chronic obstructive pulmonary disease) ([address]), COVID-19 virus infection (2/23/2023), CPAP (continuous positive airway pressure) dependence, Emphysema of lung (HCC), Hypoxia, Left bundle branch block, Multiple pulmonary nodules, Pneumonia, Sleep apnea, Sleep apnea, obstructive, Smoker, and Weight loss (8/29/2019). Pt with active OT orders in which OT consulted to assess pt's functional status and occupational performance to determine safe d/c needs. Pt seen with PT to increase safety, decrease fall risk, and maximize functional/occupational performance 2* medical complexity which is a regression from pt's functional baseline. Pt lives with his wife and son in a 1 story ranch home with 3 CARLO. PTA, pt requires assistance w/ ADLs/IADLs and functional mobility w/ RW. (-) driving.  Currently, pt performing bed mobility @ supervision level, functional transfers/mobility w/ Min A x1 w/ RW, UB ADLs w/ Min A, and LB ADLs w/ Mod A however at baseline, pt has assistance with LB ADLs. Pt demonstrates the following limitations/impairments which impact the pt's ability to engage in valued occupations: cognition, balance, endurance/activity tolerance, standing tolerance, functional reach, postural/trunk control, strength, safety awareness, and pain. From an OT standpoint, recommend discharge to home with home health pending continued functional progress, once medically stable. The patient's raw score on the -PAC Daily Activity Inpatient Short Form is 17. A raw score of less than 19 suggests the patient may benefit from discharge to post-acute rehabilitation services however please refer to the recommendation of the Occupational Therapist for safe discharge planning. Pt would benefit from skilled OT services 2-3x/wk to address immediate acute care needs and underlying performance skills to promote safety, decrease fall risk, and enhance occupational performance to return to PLOF. Goals to be met within the next 10-14 days.      OT Discharge Recommendation: Home with home health rehabilitation (Pending availability/confirmation of support/assistance @ home; in unable to meet current functional needs, then would recommend rehab)

## 2023-07-19 NOTE — CONSULTS
Advanced Heart Failure/Pulmonary Hypertension Consult Note - Zander Vargas. 79 y.o. male MRN: [id number]    Unit/Bed#: [facility] 510-01 Encounter: [id number]      Assessment:    Principal Problem:    Acute hypercapnic respiratory failure (HCC)  Active Problems:    Impaired fasting glucose    Goals of care, counseling/discussion    Hyponatremia    Acute on chronic systolic congestive heart failure (HCC)    End stage COPD (HCC)      Plan:  Acute on chronic HFrEF; LVEF 20%; NYHA III; ACC/AHA Stage C Etiology: Nonischemic cardiomyopathy.  Possible dyssynchrony from chronic LBBB.  Despite QRS only being 120 ms, echo shows significant dyssynchrony. Recent drop in EF likely related to stress myopathy with acute exacerbation of COPD. Warm, JVP appears elevated. He has pitting lower extremity edema though some of which may be 2/2 poor nutrition, mild hypoalbuminemia with low oncotic pressures. Agree with IV diuresis. Assess response. Likely not a candidate for any device type therapies given high one year mortality risk and no advanced therapy options given end stage COPD. Will continue to optimize medically however will need ongoing [address] [place] [address] discussions with assistance of palliative care team.        TTE 5/1/23: LVEF 20%. LVIDd 5.4 cm, Severe global hypokinesis with regional variation. RV cavity size normal, systolic function normal. Trace MR, TR.   TTE 2/21/23: LVEF 30%. LVIDd 6.6cm. severe global hypokinesis. Grade I DD. RV cavity size and systolic function normal. Mild TR.   TTE 2/12/2023: LVEF 20-25%.  Severe global hypokinesis with regional variation.  Grade 1 DD.  Abnormal septal motion consistent with LBBB.  RV cavity mildly dilated, normal systolic function.  Mild MR, mild TR.  RVSP 38.  Dilated IVC. TTE 8/26/2022: LVEF 40%.  RV cavity mildly dilated.  Systolic function normal.  Mild MR, TR.  Normal IVC.   6/13/22: CT Chest w/o contrast: Scattered coronary artery calcifications     Neurohormonal Blockade:  --Beta Blocker: no  --ARNi / ACEi / Virgle Ronde 12.5 mg QD, off   --Aldosterone Antagonist: no   --SGLT2 Inhibitor: no   --Home Diuretic: torsemide 20 mg QD    --Inpatient diuretic: Lasix 40 mg IV BID    Sudden Cardiac Death Risk Reduction:  --ICD: LVEF newly reduced to 20-25%.     Electrical Resynchronization:  --Candidacy for BiV device: QRSd 120ms,  with dyssynchrony on echocardiogram.     Advanced Therapies (if appropriate): Not appropriate at this time, particularly considering advanced lung disease.          Hyponatremia. 123 on admit. Baseline around 130 with reported h/o SIADH. Close monitoring with diuresis. Appreciate nephrology recs. COVID-19 2/22/23.   Nonobstructive CAD  Has coronary calcium on CT  On aspirin and statin  Hyperlipidemia  Continue statin  LDL 75 8/2021  COPD (end stage)  Former smoker; 105 pack years, quit in 2012. Severe disease, follows closely with pulm.  On home oxygen, 3L. Multiple hospitalizations with acute COPD exacerbations   Management per pulm. KEVIN   On BiPAP nightly  Severe protein calorie malnutrition. BMI 18  GOC. Follows with palliative care. Appreciate involvement. HPI:   79year old with PMH as above who was transported to the ED by EMS last PM after being found on the floor next to his bed by his wife. Found to be somnolent, confused, tachypneic and in respiratory distress. Placed on bipap. Admission labs showed significant hyponatremia with Na of 123, bicarb of >45, with normal BUN/creat. CO2 of 88 on VBG. On my examination patient is resting in bed with family at bed side. He is somnolent though easily arousableOriented x 3. . Patient is ill appearing and cachectic. He is currently on 4 L NC with normal O2 sats and in no distress. In SR on telemetry. Hemodynamically stable. In discussion with patient and family, they are distraught over his code status and having difficulty making decisions on the terms of this.  Patient verbalizes if it were up to him, he would not pursue any aggressive interventions moving forward and would prefer to allow things to occur "naturally". States the reason he continues to fight is primarily for his family who is not ready for him to pass.    Past Medical History:   Diagnosis Date   • Acute metabolic encephalopathy 7/87/2800   • Arthritis    • Bladder mass    • Cardiomyopathy [facility])    • Chest pain    • COPD (chronic obstructive pulmonary disease) ([facility])    • COVID-19 virus infection 2/23/2023   • CPAP (continuous positive airway pressure) dependence    • Emphysema of lung ([facility])    • Hypoxia     nocturnal   • Left bundle branch block    • Multiple pulmonary nodules     last assessed: 10/12/16   • Pneumonia    • Sleep apnea    • Sleep apnea, obstructive    • Smoker    • Weight loss 8/29/2019       12 point ROS negative other than that stated in HPI    No Known Allergies      Current Facility-Administered Medications:   •  acetaminophen (TYLENOL) tablet 650 mg, 650 mg, Oral, Q6H PRN, Miguel Rack, DO  •  albuterol (2.5 mg/3 mL) 0.083 % inhalation solution **ADS Override Pull**, , , ,   •  aspirin chewable tablet 81 mg, 81 mg, Oral, Daily, Miguel Rack, DO  •  budesonide (PULMICORT) inhalation solution 0.5 mg, 0.5 mg, Nebulization, Q12H, Miguel Rack, DO, 0.5 mg at 07/19/23 0746  •  doxycycline (VIBRAMYCIN) 100 mg in sodium chloride 0.9 % 100 mL IVPB, 100 mg, Intravenous, Q12H, Miguel Rack, DO, Last Rate: 100 mL/hr at 07/19/23 0642, 100 mg at 07/19/23 9896  •  ergocalciferol (VITAMIN D2) capsule 50,000 Units, 50,000 Units, Oral, Q28 Days, Miguel Rack, DO  •  furosemide (LASIX) injection 40 mg, 40 mg, Intravenous, BID (diuretic), Miguel Rack, DO  •  guaiFENesin (MUCINEX) 12 hr tablet 1,200 mg, 1,200 mg, Oral, Q12H [address], Miguel Rack, DO  •  heparin (porcine) subcutaneous injection 5,000 Units, 5,000 Units, Subcutaneous, Q8H [address], Miguel Rack, DO, 5,000 Units at 07/19/23 8040  •  insulin lispro (HumaLOG) 100 units/mL subcutaneous injection 1-5 Units, 1-5 Units, Subcutaneous, Q6H [facility] **AND** Fingerstick Glucose (POCT), , , Q6H, Myriam Arrington DO  •  ipratropium (ATROVENT) 0.02 % inhalation solution **ADS Override Pull**, , , ,   •  ipratropium (ATROVENT) 0.02 % inhalation solution 0.5 mg, 0.5 mg, Nebulization, TID, Thernilda Singhte, DO, 0.5 mg at 23 0746  •  levalbuterol (XOPENEX) inhalation solution 1.25 mg, 1.25 mg, Nebulization, TID, Samiraa Franciscote, DO, 1.25 mg at 23 0745  •  magnesium sulfate IVPB (premix) SOLN 1 g, 1 g, Intravenous, Once, Myriam Arrington DO  •  methylPREDNISolone sodium succinate (Solu-MEDROL) injection 40 mg, 40 mg, Intravenous, Q8H [facility], Myriam Arrington DO    Social History     Socioeconomic History   • Marital status: /Civil Union     Spouse name: Not on file   • Number of children: Not on file   • Years of education: Not on file   • Highest education level: Not on file   Occupational History   • Not on file   Tobacco Use   • Smoking status: Former     Packs/day: 2.50     Years: 42.00     Total pack years: 105.00     Types: Cigarettes     Start date: 5     Quit date:      Years since quittin.5   • Smokeless tobacco: Former   • Tobacco comments:     1 ppd for 37 years, 2010 down to 5 cigs a day, is around second hand smoke   Vaping Use   • Vaping Use: Never used   Substance and Sexual Activity   • Alcohol use: Not Currently     Comment: rarely   • Drug use: No   • Sexual activity: Not Currently     Partners: Female   Other Topics Concern   • Not on file   Social History Narrative    Daily coffee consumption: 8 or more cups a day        Used to work in [employer]. On disability.      Social Determinants of Health     Financial Resource Strain: Not on file   Food Insecurity: No Food Insecurity (2023)    Hunger Vital Sign    • Worried About Running Out of Food in the Last Year: Never true    • Ran Out of Food in the Last Year: Never true   Transportation Needs: No Transportation Needs (2023) PRAPARE - Transportation    • Lack of Transportation (Medical): No    • Lack of Transportation (Non-Medical): No   Physical Activity: Not on file   Stress: Not on file   Social Connections: Not on file   Intimate Partner Violence: Not on file   Housing Stability: Unknown (6/2/2023)    Housing Stability Vital Sign    • Unable to Pay for Housing in the Last Year: No    • Number of Places Lived in the Last Year: Not on file    • Unstable Housing in the Last Year: No       Family History   Problem Relation Age of Onset   • Diabetes Mother        Physical Exam:  Central Line (day, reason): Stanley catheter (day, reason):    Vitals: Blood pressure 94/55, pulse 70, temperature 98.2 °F (36.8 °C), temperature source Oral, resp. rate (!) 34, SpO2 99 %. , There is no height or weight on file to calculate BMI., No intake/output data recorded. No intake/output data recorded. Wt Readings from Last 3 Encounters:   06/26/23 43.8 kg (96 lb 8 oz)   06/16/23 43.1 kg (95 lb)   06/12/23 47.8 kg (105 lb 4.8 oz)     No intake or output data in the 24 hours ending 07/19/23 0948  No intake/output data recorded. No significant arrhythmias seen on telemetry review. Vitals:    07/19/23 0500 07/19/23 0553 07/19/23 0700 07/19/23 0750   BP:  125/56 94/55    Pulse: 80 88 83 70   Resp: 18 (!) 29 20 (!) 34   Temp:  (!) 97.3 °F (36.3 °C) 98.2 °F (36.8 °C)    TempSrc:   Oral    SpO2: 96% 100% 100% 99%       GEN: Aria Hutching. Is frail, ill appearing, somnolent. HEENT: pupils equal, round, and reactive to light; extraocular muscles intact  NECK: supple, no carotid bruits   HEART: regular rhythm, normal S1 and S2, no murmurs, clicks, gallops or rubs, JVP is elevated   LUNGS: coarse, diminished. ABDOMEN: normal bowel sounds, soft, no tenderness, no distention  EXTREMITIES: peripheral pulses normal; no clubbing, cyanosis, +1-+2 BLLE edema to the knee.    NEURO: no focal findings   SKIN: normal without suspicious lesions on exposed skin    Labs & Results:        Results from last 7 days   Lab Units 07/19/23  0618 07/19/23  0245   WBC Thousand/uL 10.45* 11.08*   HEMOGLOBIN g/dL 12.6 12.0   HEMATOCRIT % 37.4 36.1*   PLATELETS Thousands/uL 159 204         Results from last 7 days   Lab Units 07/19/23  0245   POTASSIUM mmol/L 3.3*   CHLORIDE mmol/L 71*   CO2 mmol/L >45*   BUN mg/dL 16   CREATININE mg/dL 0.77   CALCIUM mg/dL 8.8   ALK PHOS U/L 64   ALT U/L 23   AST U/L 43     Results from last 7 days   Lab Units 07/19/23  0618   INR  0.78*       Chest X-Ray is obtained; result - reviewed. EKG personally reviewed by Dannielle Meckel, CRNP. Counseling / Coordination of Care  Total floor / unit time spent today 40 minutes. Greater than 50% of total time was spent with the patient and / or family counseling and / or coordination of care. A description of the counseling / coordination of care: 20. Thank you for the opportunity to participate in the care of this patient. Bren Aguirre

## 2023-07-19 NOTE — ED PROVIDER NOTES
History  Chief Complaint   Patient presents with   • Asif Carrillo out of bed approx 1 foot. Takes aspirin. No complaints just here for eval. Hx COPD     Patient is a 46-year-old male with past medical history significant for COPD on chronic O2, CHF, heart failure with EF 20%, CAD presenting to the emergency department for evaluation of increased shortness of breath. Patient of note also fell out of bed. Denies any head strike safe. Patient does take aspirin. Patient is currently complaining of shortness of breath. After speaking with him he notes that over the past couple weeks his shortness of breath has increased. He denies any chest pain nausea vomiting diarrhea or constipation denies any dysuria hematuria. Prior to Admission Medications   Prescriptions Last Dose Informant Patient Reported? Taking?    Diclofenac Sodium (VOLTAREN) 1 %  Spouse/Significant Other, Self Yes No   Sig: APPLY 2 GRAMS 4 TIMES A DAY   Melatonin 5 MG TABS  Spouse/Significant Other, Self Yes No   Sig: Take 1 tablet by mouth daily at bedtime   acetaminophen (TYLENOL) 325 mg tablet  Spouse/Significant Other, Self No No   Sig: Take 3 tablets (975 mg total) by mouth every 8 (eight) hours as needed for mild pain or moderate pain   albuterol (PROVENTIL HFA,VENTOLIN HFA) 90 mcg/act inhaler  Self, Spouse/Significant Other No No   Sig: Inhale 2 puffs every 6 (six) hours as needed for wheezing   aspirin 81 mg chewable tablet  Self, Spouse/Significant Other No No   Sig: Chew 1 tablet (81 mg total) daily   budesonide (PULMICORT) 0.5 mg/2 mL nebulizer solution  Self, Spouse/Significant Other No No   Sig: TAKE 2 ML (0.5 MG TOTAL) BY NEBULIZATION TWICE A DAY RINSE MOUTH AFTER USE   chlorhexidine (PERIDEX) 0.12 % solution  Self, Spouse/Significant Other No No   Sig: Apply 15 mL to the mouth or throat every 12 (twelve) hours   cyanocobalamin (VITAMIN B-12) 1000 MCG tablet  Self, Spouse/Significant Other No No   Sig: Take 1 tablet (1,000 mcg total) by mouth daily   docusate sodium (COLACE) 100 mg capsule  Spouse/Significant Other, Self Yes No   Sig: Take 100 mg by mouth 2 (two) times a day   Patient not taking: Reported on 2023   ergocalciferol (VITAMIN D2) 50,000 units  Self, Spouse/Significant Other No No   Sig: TAKE 1 CAPSULE (50,000 UNITS TOTAL) BY MOUTH EVERY 28 DAYS   formoterol (PERFOROMIST) 20 MCG/2ML nebulizer solution  Self, Spouse/Significant Other Yes No   Sig: TAKE 2 ML (20 MCG TOTAL) BY NEBULIZATION 2 (TWO) TIMES A DAY   guaiFENesin (MUCINEX) 600 mg 12 hr tablet  Self, Spouse/Significant Other No No   Sig: Take 2 tablets (1,200 mg total) by mouth every 12 (twelve) hours   hydrOXYzine HCL (ATARAX) 25 mg tablet  Self, Spouse/Significant Other No No   Sig: Take 1 tablet (25 mg total) by mouth every 6 (six) hours as needed for anxiety   Patient not taking: Reported on 2023   ipratropium-albuterol (DUO-NEB) 0.5-2.5 mg/3 mL nebulizer solution   Yes No   predniSONE 10 mg tablet  Self, Spouse/Significant Other No No   Sig: Take 4 tablets (40 mg total) by mouth daily Reduce dose by 10 mg every 5 days  until you reach your regular dosing of steroids 5 mg daily per your lung doctro Do not start before 2023.    rosuvastatin (CRESTOR) 10 MG tablet  Spouse/Significant Other, Self No No   Sig: Take 1 tablet (10 mg total) by mouth daily   sodium chloride (OCEAN) 0.65 % nasal spray  Self, Spouse/Significant Other No No   Si spray into each nostril every hour as needed for congestion   tamsulosin (FLOMAX) 0.4 mg  Spouse/Significant Other, Self No No   Sig: Take 1 capsule (0.4 mg total) by mouth daily with dinner   torsemide (DEMADEX) 20 mg tablet  Self, Spouse/Significant Other No No   Sig: Take 1 tablet (20 mg total) by mouth daily      Facility-Administered Medications: None       Past Medical History:   Diagnosis Date   • Acute metabolic encephalopathy    • Arthritis    • Bladder mass    • Cardiomyopathy [facility])    • Chest pain    • COPD (chronic obstructive pulmonary disease) ([facility])    • COVID-19 virus infection 2023   • CPAP (continuous positive airway pressure) dependence    • Emphysema of lung (HCC)    • Hypoxia     nocturnal   • Left bundle branch block    • Multiple pulmonary nodules     last assessed: 10/12/16   • Pneumonia    • Sleep apnea    • Sleep apnea, obstructive    • Smoker    • Weight loss 2019       Past Surgical History:   Procedure Laterality Date   • COLONOSCOPY     • CYSTOSCOPY     • CYSTOSCOPY  2021   • OH BLADDER INSTILLATION ANTICARCINOGENIC AGENT N/A 1/3/2020    Procedure: INSTILLATION MITOMYCIN;  Surgeon: Neha Walter MD;  [facility];  Service: Urology   • OH CYSTO W/REMOVAL OF LESIONS SMALL N/A 1/3/2020    Procedure: TRANSURETHRAL RESECTION OF BLADDER TUMOR (TURBT); Surgeon: Neha Walter MD;  [facility];  Service: Urology   • OH CYSTOURETHROSCOPY WITH BIOPSY N/A 2021    Procedure: Olu Ramirez;  Surgeon: Neha Walter MD;  [facility];  Service: Urology   • OH TRURL ELECTROSURG RESCJ PROSTATE BLEED COMPLETE N/A 2021    Procedure: TRANSURETHRAL RESECTION OF PROSTATE (TURP) BLADDER BIOPSY, FULGURATION;  Surgeon: Neha Walter MD;  [facility];  Service: Urology       Family History   Problem Relation Age of Onset   • Diabetes Mother      I have reviewed and agree with the history as documented.     E-Cigarette/Vaping   • E-Cigarette Use Never User      E-Cigarette/Vaping Substances   • Nicotine No    • THC No    • CBD No    • Flavoring No    • Other No    • Unknown No      Social History     Tobacco Use   • Smoking status: Former     Packs/day: 2.50     Years: 42.00     Total pack years: 105.00     Types: Cigarettes     Start date: 5     Quit date:      Years since quittin.5   • Smokeless tobacco: Former   • Tobacco comments:     1 ppd for 37 years, 2010 down to 5 cigs a day, is around second hand smoke Vaping Use   • Vaping Use: Never used   Substance Use Topics   • Alcohol use: Not Currently     Comment: rarely   • Drug use: No        Review of Systems   Constitutional: Positive for activity change and appetite change. Negative for fever. HENT: Negative for congestion. Respiratory: Positive for shortness of breath and wheezing. Negative for chest tightness. Gastrointestinal: Negative for abdominal distention, abdominal pain, diarrhea, nausea and vomiting. Musculoskeletal: Negative for arthralgias and back pain. Neurological: Negative for dizziness, weakness, light-headedness and headaches. Psychiatric/Behavioral: Negative for agitation. Physical Exam  ED Triage Vitals   Temperature Pulse Respirations Blood Pressure SpO2   07/19/23 0432 07/19/23 0230 07/19/23 0230 07/19/23 0230 07/19/23 0230   (!) 97.2 °F (36.2 °C) (!) 54 (!) 26 135/96 94 %      Temp Source Heart Rate Source Patient Position - Orthostatic VS BP Location FiO2 (%)   07/19/23 0432 07/19/23 0230 -- -- --   Oral Monitor         Pain Score       07/19/23 0230       No Pain             Orthostatic Vital Signs  Vitals:    07/19/23 0230 07/19/23 0330 07/19/23 0500 07/19/23 0553   BP: 135/96   125/56   Pulse: (!) 54 90 80 88       Physical Exam  Constitutional:       Appearance: He is ill-appearing. Comments: Chronically ill-appearing, cachectic, tripoding   HENT:      Head: Normocephalic. Right Ear: External ear normal.      Left Ear: External ear normal.      Nose: Nose normal.      Mouth/Throat:      Mouth: Mucous membranes are dry. Eyes:      Extraocular Movements: Extraocular movements intact. Cardiovascular:      Rate and Rhythm: Tachycardia present. Heart sounds: Normal heart sounds. Pulmonary:      Effort: Respiratory distress present. Breath sounds: Wheezing and rhonchi present. Comments: Increased work of breathing, tachypneic  Abdominal:      General: Abdomen is flat.    Musculoskeletal: General: Normal range of motion. Cervical back: Normal and normal range of motion. Thoracic back: Normal.      Lumbar back: Normal.      Right lower leg: No edema. Left lower leg: No edema. Comments: Patient spontaneously moving all extremities without any difficulty or pain   Skin:     Capillary Refill: Capillary refill takes 2 to 3 seconds. Neurological:      General: No focal deficit present. Mental Status: He is oriented to person, place, and time.    Psychiatric:         Behavior: Behavior normal.         ED Medications  Medications   albuterol (2.5 mg/3 mL) 0.083 % inhalation solution **ADS Override Pull** (  Not Given 7/19/23 0259)   ipratropium (ATROVENT) 0.02 % inhalation solution **ADS Override Pull** (  Not Given 7/19/23 0259)   acetaminophen (TYLENOL) tablet 650 mg (has no administration in time range)   aspirin chewable tablet 81 mg (has no administration in time range)   budesonide (PULMICORT) inhalation solution 0.5 mg (has no administration in time range)   ergocalciferol (VITAMIN D2) capsule 50,000 Units (has no administration in time range)   guaiFENesin (MUCINEX) 12 hr tablet 1,200 mg (has no administration in time range)   heparin (porcine) subcutaneous injection 5,000 Units (5,000 Units Subcutaneous Given 7/19/23 0623)   levalbuterol (XOPENEX) inhalation solution 1.25 mg (has no administration in time range)   methylPREDNISolone sodium succinate (Solu-MEDROL) injection 40 mg (has no administration in time range)   furosemide (LASIX) injection 40 mg (has no administration in time range)   magnesium sulfate IVPB (premix) SOLN 1 g (has no administration in time range)   potassium chloride 20 mEq IVPB (premix) (20 mEq Intravenous New Bag 7/19/23 0623)   doxycycline (VIBRAMYCIN) 100 mg in sodium chloride 0.9 % 100 mL IVPB (100 mg Intravenous New Bag 7/19/23 0642)   insulin lispro (HumaLOG) 100 units/mL subcutaneous injection 1-5 Units (has no administration in time range) ipratropium (ATROVENT) 0.02 % inhalation solution 0.5 mg (has no administration in time range)   methylPREDNISolone sodium succinate (Solu-MEDROL) injection 80 mg (80 mg Intravenous Given 7/19/23 0248)   albuterol inhalation solution 10 mg (10 mg Nebulization Given 7/19/23 0304)     And   ipratropium (ATROVENT) 0.02 % inhalation solution 1 mg (1 mg Nebulization Given 7/19/23 0305)     And   sodium chloride 0.9 % inhalation solution 3 mL (3 mL Nebulization Given 7/19/23 0305)       Diagnostic Studies  Results Reviewed     Procedure Component Value Units Date/Time    Procalcitonin [[id number]] Updated: 07/19/23 0736    Lab Status: No result Specimen: Blood from Arm, Right     TSH, 3rd generation with Free T4 reflex [[id number]] Updated: 07/19/23 0736    Lab Status: No result Specimen: Blood from Arm, Right     Cortisol [[id number]] Updated: 07/19/23 0736    Lab Status: No result Specimen: Blood from Arm, Right     APTT [[id number]]  (Abnormal) Collected: 07/19/23 0618    Lab Status: Final result Specimen: Blood from Arm, Right Updated: 07/19/23 0731     PTT 19 seconds     Protime-INR [[id number]]  (Abnormal) Collected: 07/19/23 0618    Lab Status: Final result Specimen: Blood from Arm, Right Updated: 07/19/23 0731     Protime 11.1 seconds      INR 0.78    COVID/FLU/RSV [[id number]]  (Normal) Collected: 07/19/23 0622    Lab Status: Final result Specimen: Nares from Nose Updated: 07/19/23 0724     SARS-CoV-2 Negative     INFLUENZA A PCR Negative     INFLUENZA B PCR Negative     RSV PCR Negative    Narrative:      FOR PEDIATRIC PATIENTS - copy/paste COVID Guidelines URL to browser: [email or web address]    SARS-CoV-2 assay is a Nucleic Acid Amplification assay intended for the  qualitative detection of nucleic acid from SARS-CoV-2 in nasopharyngeal  swabs. Results are for the presumptive identification of SARS-CoV-2 RNA.     Positive results are indicative of infection with SARS-CoV-2, the virus  causing COVID-19, but do not rule out bacterial infection or co-infection  with other viruses. Laboratories within the [facility] and its  territories are required to report all positive results to the appropriate  public health authorities. Negative results do not preclude SARS-CoV-2  infection and should not be used as the sole basis for treatment or other  patient management decisions. Negative results must be combined with  clinical observations, patient history, and epidemiological information. This test has not been FDA cleared or approved. This test has been authorized by FDA under an Emergency Use Authorization  (EUA). This test is only authorized for the duration of time the  declaration that circumstances exist justifying the authorization of the  emergency use of an in vitro diagnostic tests for detection of SARS-CoV-2  virus and/or diagnosis of COVID-19 infection under section 564(b)(1) of  the Act, 21 U. S.C. 385VNS-9(O)(6), unless the authorization is terminated  or revoked sooner. The test has been validated but independent review by FDA  and CLIA is pending. Test performed using [employer] Gene[employer]pert: This RT-PCR assay targets N2,  a region unique to SARS-CoV-2. A conserved region in the E-gene was chosen  for pan-Sarbecovirus detection which includes SARS-CoV-2. According to CMS-2020-01-R, this platform meets the definition of high-throughput technology. CBC and differential [[id number]]  (Abnormal) Collected: 07/19/23 0618    Lab Status: Preliminary result Specimen: Blood from Arm, Right Updated: 07/19/23 0647     WBC 10.45 Thousand/uL      RBC 3.94 Million/uL      Hemoglobin 12.6 g/dL      Hematocrit 37.4 %      MCV 95 fL      MCH 32.0 pg      MCHC 33.7 g/dL      RDW 13.2 %      MPV 12.7 fL      Platelets 725 Thousands/uL     HS Troponin I 4hr [[id number]] Collected: 07/19/23 0618    Lab Status:  In process Specimen: Blood from Arm, Right Updated: 07/19/23 0079 Osmolality-"If this is regarding a toxic alcohol, STOP. Test is not routinely indicated. Please consult medical  on call for further guidance." [[id number]] Collected: 07/19/23 0618    Lab Status:  In process Specimen: Blood from Arm, Right Updated: 07/19/23 0638    Blood gas, venous [[id number]]  (Abnormal) Collected: 07/19/23 0531    Lab Status: Final result Specimen: Blood from Arm, Right Updated: 07/19/23 0622     pH, Oscar 7.339     pCO2, Oscar 88.3 mm Hg      pO2, Oscar 35.0 mm Hg      HCO3, Oscar 46.5 mmol/L      Base Excess, Oscar 16.5 mmol/L      O2 Content, Oscar 11.1 ml/dL      O2 HGB, VENOUS 62.2 %     HS Troponin I 2hr [[id number]]  (Normal) Collected: 07/19/23 0449    Lab Status: Final result Specimen: Blood from Arm, Right Updated: 07/19/23 0603     hs TnI 2hr 37 ng/L      Delta 2hr hsTnI -1 ng/L     Sodium, urine, random [[id number]]     Lab Status: No result Specimen: Urine     Osmolality, urine [[id number]]     Lab Status: No result Specimen: Urine     Procalcitonin [[id number]]     Lab Status: No result Specimen: Blood     Comprehensive metabolic panel [[id number]]  (Abnormal) Collected: 07/19/23 0245    Lab Status: Final result Specimen: Blood from Arm, Right Updated: 07/19/23 0410     Sodium 123 mmol/L      Potassium 3.3 mmol/L      Chloride 71 mmol/L      CO2 >45 mmol/L      ANION GAP --     BUN 16 mg/dL      Creatinine 0.77 mg/dL      Glucose 144 mg/dL      Calcium 8.8 mg/dL      AST 43 U/L      ALT 23 U/L      Alkaline Phosphatase 64 U/L      Total Protein 6.3 g/dL      Albumin 3.5 g/dL      Total Bilirubin 0.82 mg/dL      eGFR 94 ml/min/1.73sq m     Narrative:      [place] guidelines for Chronic Kidney Disease (CKD):   •  Stage 1 with normal or high GFR (GFR > 90 mL/min/1.73 square meters)  •  Stage 2 Mild CKD (GFR = 60-89 mL/min/1.73 square meters)  •  Stage 3A Moderate CKD (GFR = 45-59 mL/min/1.73 square meters)  •  Stage 3B Moderate CKD (GFR = 30-44 mL/min/1.73 square meters)  •  Stage 4 Severe CKD (GFR = 15-29 mL/min/1.73 square meters)  •  Stage 5 End Stage CKD (GFR <15 mL/min/1.73 square meters)  Note: GFR calculation is accurate only with a steady state creatinine    HS Troponin 0hr (reflex protocol) [[id number]]  (Normal) Collected: 07/19/23 0245    Lab Status: Final result Specimen: Blood from Arm, Right Updated: 07/19/23 0328     hs TnI 0hr 38 ng/L     CBC and differential [[id number]]  (Abnormal) Collected: 07/19/23 0245    Lab Status: Final result Specimen: Blood from Arm, Right Updated: 07/19/23 0259     WBC 11.08 Thousand/uL      RBC 3.78 Million/uL      Hemoglobin 12.0 g/dL      Hematocrit 36.1 %      MCV 96 fL      MCH 31.7 pg      MCHC 33.2 g/dL      RDW 13.0 %      MPV 9.1 fL      Platelets 985 Thousands/uL      nRBC 0 /100 WBCs      Neutrophils Relative 79 %      Immat GRANS % 1 %      Lymphocytes Relative 8 %      Monocytes Relative 12 %      Eosinophils Relative 0 %      Basophils Relative 0 %      Neutrophils Absolute 8.75 Thousands/µL      Immature Grans Absolute 0.05 Thousand/uL      Lymphocytes Absolute 0.89 Thousands/µL      Monocytes Absolute 1.37 Thousand/µL      Eosinophils Absolute 0.01 Thousand/µL      Basophils Absolute 0.01 Thousands/µL                  XR chest 1 view portable   ED Interpretation by Izzy Horton DO (07/19 0344)   Consistent with copd; no definitive consolidations noted            Procedures  Procedures      ED Course  ED Course as of 07/19/23 0740   Wed Jul 19, 2023   0312 SLIM   0328 hs TnI 0hr: 38  Reached outto slim for admission   0412 Sodium(!): 123   0412 CO2(!!): >45                                       Medical Decision Making  Patient presented to the hospital via EMS after a fall at complaining of increased work of breathing. I was immediately brought to bedside to evaluate patient. He denies any head strike or loss of consciousness during the event does take aspirin no other blood thinners.   Patient states that over the past couple weeks he is becoming progressively worsening shortness of breath. Patient had bilateral rhonchorous lung sounds as well as was tachypneic, increased work of breathing, rib retractions, DuoNeb was given to the patient immediately which provided some relief. Also did steroids for COPD exacerbation. Cardiac work-up was initiated as well as chest x-ray was performed to rule out or in pneumothorax or pneumonia. Patient ultimately admitted to [facility]. Amount and/or Complexity of Data Reviewed  Labs: ordered. Decision-making details documented in ED Course. Radiology: ordered and independent interpretation performed. Risk  Prescription drug management. Decision regarding hospitalization.             Disposition  Final diagnoses:   COPD with acute exacerbation ([address])   SOB (shortness of breath)   Hypoxia   Acute exacerbation of chronic obstructive pulmonary disease (COPD) ([address])     Time reflects when diagnosis was documented in both MDM as applicable and the Disposition within this note     Time User Action Codes Description Comment    7/19/2023  3:12 AM Kari Rhea Add [J44.1] COPD with acute exacerbation ([address])     7/19/2023  3:35 AM Palladino, Samie Gaudy Add [R06.02] SOB (shortness of breath)     7/19/2023  4:18 AM Kari Rhea Add [R09.02] Hypoxia     7/19/2023  4:18 AM Vitaliy Wei Add [J44.1] Acute exacerbation of chronic obstructive pulmonary disease (COPD) ([address])     7/19/2023  5:30 AM Maya Began Add [Z51.5] Palliative care patient     7/19/2023  5:38 AM Maya Began Add [J44.9] End stage COPD ([address])     7/19/2023  5:38 AM Maya Began Add [I50.43] Acute on chronic combined systolic and diastolic heart failure ([address])     7/19/2023  5:38 AM Maya Began Add [E43] Severe protein-calorie malnutrition [facility])       ED Disposition     ED Disposition   Admit    Condition   Stable    Date/Time   Wed Jul 19, 2023  3:12 AM    Comment   Case was discussed           Follow-up Information    None         Current Discharge Medication List      CONTINUE these medications which have NOT CHANGED    Details   acetaminophen (TYLENOL) 325 mg tablet Take 3 tablets (975 mg total) by mouth every 8 (eight) hours as needed for mild pain or moderate pain  Refills: 0    Associated Diagnoses: COPD (chronic obstructive pulmonary disease) ([facility])      albuterol (PROVENTIL HFA,VENTOLIN HFA) 90 mcg/act inhaler Inhale 2 puffs every 6 (six) hours as needed for wheezing  Qty: 8.5 g, Refills: 2    Comments: DX Code Needed  .   Associated Diagnoses: Chronic obstructive pulmonary disease, unspecified COPD type ([address])      aspirin 81 mg chewable tablet Chew 1 tablet (81 mg total) daily  Qty: 30 tablet, Refills: 0    Associated Diagnoses: Acute respiratory failure with hypoxia and hypercapnia ([facility]); COPD with acute exacerbation ([facility])      budesonide (PULMICORT) 0.5 mg/2 mL nebulizer solution TAKE 2 ML (0.5 MG TOTAL) BY NEBULIZATION TWICE A DAY RINSE MOUTH AFTER USE  Qty: 360 mL, Refills: 2    Associated Diagnoses: Pulmonary emphysema, unspecified emphysema type ([facility])      chlorhexidine (PERIDEX) 0.12 % solution Apply 15 mL to the mouth or throat every 12 (twelve) hours  Qty: 120 mL, Refills: 0    Associated Diagnoses: Acute respiratory failure, unspecified whether with hypoxia or hypercapnia ([facility])      cyanocobalamin (VITAMIN B-12) 1000 MCG tablet Take 1 tablet (1,000 mcg total) by mouth daily  Qty: 30 tablet, Refills: 0    Associated Diagnoses: Anemia      Diclofenac Sodium (VOLTAREN) 1 % APPLY 2 GRAMS 4 TIMES A DAY      docusate sodium (COLACE) 100 mg capsule Take 100 mg by mouth 2 (two) times a day      ergocalciferol (VITAMIN D2) 50,000 units TAKE 1 CAPSULE (50,000 UNITS TOTAL) BY MOUTH EVERY 28 DAYS  Qty: 3 capsule, Refills: 1    Associated Diagnoses: Localized osteoporosis without current pathological fracture; Vitamin D deficiency      formoterol (PERFOROMIST) 20 MCG/2ML nebulizer solution TAKE 2 ML (20 MCG TOTAL) BY NEBULIZATION 2 (TWO) TIMES A DAY      guaiFENesin (MUCINEX) 600 mg 12 hr tablet Take 2 tablets (1,200 mg total) by mouth every 12 (twelve) hours  Qty: 60 tablet, Refills: 6    Associated Diagnoses: Pulmonary emphysema, unspecified emphysema type (HCC)      hydrOXYzine HCL (ATARAX) 25 mg tablet Take 1 tablet (25 mg total) by mouth every 6 (six) hours as needed for anxiety  Qty: 30 tablet, Refills: 0    Associated Diagnoses: Acute respiratory failure, unspecified whether with hypoxia or hypercapnia (HCC)      ipratropium-albuterol (DUO-NEB) 0.5-2.5 mg/3 mL nebulizer solution       Melatonin 5 MG TABS Take 1 tablet by mouth daily at bedtime      predniSONE 10 mg tablet Take 4 tablets (40 mg total) by mouth daily Reduce dose by 10 mg every 5 days  until you reach your regular dosing of steroids 5 mg daily per your lung doctro Do not start before June 13, 2023. Qty: 50 tablet, Refills: 0    Associated Diagnoses: Acute respiratory failure, unspecified whether with hypoxia or hypercapnia (HCC)      rosuvastatin (CRESTOR) 10 MG tablet Take 1 tablet (10 mg total) by mouth daily  Qty: 90 tablet, Refills: 3    Associated Diagnoses: Pulmonary emphysema, unspecified emphysema type (HCC)      sodium chloride (OCEAN) 0.65 % nasal spray 1 spray into each nostril every hour as needed for congestion  Qty: 30 mL, Refills: 0    Associated Diagnoses: Acute respiratory failure, unspecified whether with hypoxia or hypercapnia ([facility])      tamsulosin (FLOMAX) 0.4 mg Take 1 capsule (0.4 mg total) by mouth daily with dinner  Refills: 0    Associated Diagnoses: Urinary retention; BPH with obstruction/lower urinary tract symptoms      torsemide (DEMADEX) 20 mg tablet Take 1 tablet (20 mg total) by mouth daily  Qty: 90 tablet, Refills: 3    Associated Diagnoses: Chronic HFrEF (heart failure with reduced ejection fraction) ([facility])           No discharge procedures on file.     PDMP Review       Value Time User    PDMP Reviewed  Yes 7/14/2023 11:06 AM Patricia Scott           ED Provider  Attending physically available and evaluated Rose Mary Ramirez. . I managed the patient along with the ED Attending.     Electronically Signed by         Ceasar Ramirez DO  07/19/23 0793

## 2023-07-19 NOTE — CONSULTS
Consult Note - Pulmonary   Zander Vargas. 79 y.o. male MRN: [id number]  Unit/Bed#: [facility] 510-01 Encounter: [id number]      Reason for consultation: Acute hypercapnic respiratory failure in the setting of end-stage COPD    Requesting physician: Yara Jones DO    Assessment & Recommendations:   End-stage COPD  Stage 4 heart failure (systolic and diastolic dsfxn EF 26%)  VBG prior to similar so doubt acute respiratory decompensation  Discussed with patient the poor prognosis of condition and recommendation of working with palliative care to have access to more resources at home and focusing on keeping him home rather than recurrent admissions to hospital  Though WBCs 10.45, given negative procalcitonin and lack of fever chills low suspicion for pneumonia and thus recommend treating for COPD exacerbation with azithromycin  Continue albuterol Atrovent, Pulmicort, Mucinex, Xopenex, Solu-Medrol 40 mg every 8  Ensure patient education on BiPAP use at home  Use BiPAP in hospital intermittently while sleeping    History of Present Illness   HPI:  Zander Vargas. is a 79 y.o. male with stage IV heart failure with systolic and diastolic dysfunction  (EF 20%, echo May 2023), CAD, and end-stage COPD presenting to the ED with hypercapnic respiratory failure after a fall out of bed. Family wanted to come in to check his blood gases after his fall out of bed as a suspected the fall was partially due to hypercapnia. Of note he was recently here in June for similar respiratory distress. He was discharged on prednisone taper down to baseline 5 mg. Family reports over the past couple weeks he has had progressively worse shortening of breath, decreased oral intake and appetite, decreased exercise tolerance now unable to walk few steps without difficulty, and increased somnolence.   He reports having cough and sputum production however notes that sputum gets stuck in his throat and he never is able to clear it orally, this is a chronic problem. Patient reports sleeping completely upright in bed and using CPAP at night (of note all prior notes mention home BiPAP rather than CPAP though family says CPAP was given for sleep apnea), he uses 3 L oxygen constantly at home. He reports that sometimes he feels food go down the wrong pipe. No recent illnesses or sick contacts. He denies fever, chills, chest pain, palpitations, abdominal pain, dizziness, lightheadedness. Last PFTs 9/16/202: FEV1/FVC 30%, FVC 59% predicted and FEV1 22% predicted. Review of systems:  12 point review of systems was completed and was otherwise negative except as listed in HPI. Review of Systems   Constitutional: Positive for activity change, appetite change, chills and fatigue. Negative for diaphoresis and fever. Respiratory: Positive for cough, chest tightness, shortness of breath, wheezing and stridor. Negative for apnea. Cardiovascular: Positive for leg swelling. Negative for chest pain and palpitations. Gastrointestinal: Negative. Genitourinary: Negative. Neurological: Positive for weakness. Negative for dizziness and light-headedness.          Historical Information   Past Medical History:   Diagnosis Date   • Acute metabolic encephalopathy 8/82/7294   • Arthritis    • Bladder mass    • Cardiomyopathy [facility])    • Chest pain    • COPD (chronic obstructive pulmonary disease) (HCC)    • COVID-19 virus infection 2/23/2023   • CPAP (continuous positive airway pressure) dependence    • Emphysema of lung (HCC)    • Hypoxia     nocturnal   • Left bundle branch block    • Multiple pulmonary nodules     last assessed: 10/12/16   • Pneumonia    • Sleep apnea    • Sleep apnea, obstructive    • Smoker    • Weight loss 8/29/2019     Past Surgical History:   Procedure Laterality Date   • COLONOSCOPY     • CYSTOSCOPY     • CYSTOSCOPY  02/17/2021   • SC BLADDER INSTILLATION ANTICARCINOGENIC AGENT N/A 1/3/2020    Procedure: INSTILLATION MITOMYCIN; Surgeon: Bebeto Smith MD;  [facility];  Service: Urology   • CT CYSTO W/REMOVAL OF LESIONS SMALL N/A 1/3/2020    Procedure: TRANSURETHRAL RESECTION OF BLADDER TUMOR (TURBT);   Surgeon: Bebeto Smith MD;  [facility];  Service: Urology   • CT CYSTOURETHROSCOPY WITH BIOPSY N/A 4/13/2021    Procedure: CYSTOSCOPY WITH BIOPSIES;  Surgeon: Bebeto Smith MD;  [facility];  Service: Urology   • CT TRURL ELECTROSURG [address] PROSTATE BLEED COMPLETE N/A 4/13/2021    Procedure: TRANSURETHRAL RESECTION OF PROSTATE (TURP) BLADDER BIOPSY, FULGURATION;  Surgeon: Bebeto Smith MD;  [facility];  Service: Urology     Family History   Problem Relation Age of Onset   • Diabetes Mother        Meds/Allergies   Current Facility-Administered Medications   Medication Dose Route Frequency   • acetaminophen (TYLENOL) tablet 650 mg  650 mg Oral Q6H PRN   • albuterol (2.5 mg/3 mL) 0.083 % inhalation solution **ADS Override Pull**       • aspirin chewable tablet 81 mg  81 mg Oral Daily   • budesonide (PULMICORT) inhalation solution 0.5 mg  0.5 mg Nebulization Q12H   • doxycycline (VIBRAMYCIN) 100 mg in sodium chloride 0.9 % 100 mL IVPB  100 mg Intravenous Q12H   • ergocalciferol (VITAMIN D2) capsule 50,000 Units  50,000 Units Oral Q28 Days   • furosemide (LASIX) injection 40 mg  40 mg Intravenous BID (diuretic)   • guaiFENesin (MUCINEX) 12 hr tablet 1,200 mg  1,200 mg Oral Q12H [facility]   • heparin (porcine) subcutaneous injection 5,000 Units  5,000 Units Subcutaneous Q8H [facility]   • insulin lispro (HumaLOG) 100 units/mL subcutaneous injection 1-5 Units  1-5 Units Subcutaneous Q6H [facility]   • ipratropium (ATROVENT) 0.02 % inhalation solution **ADS Override Pull**       • ipratropium (ATROVENT) 0.02 % inhalation solution 0.5 mg  0.5 mg Nebulization TID   • levalbuterol (XOPENEX) inhalation solution 1.25 mg  1.25 mg Nebulization TID   • magnesium sulfate IVPB (premix) SOLN 1 g  1 g Intravenous Once   • methylPREDNISolone sodium succinate (Solu-MEDROL) injection 40 mg  40 mg Intravenous Q8H [facility]     Medications Prior to Admission   Medication   • acetaminophen (TYLENOL) 325 mg tablet   • albuterol (PROVENTIL HFA,VENTOLIN HFA) 90 mcg/act inhaler   • aspirin 81 mg chewable tablet   • budesonide (PULMICORT) 0.5 mg/2 mL nebulizer solution   • chlorhexidine (PERIDEX) 0.12 % solution   • cyanocobalamin (VITAMIN B-12) 1000 MCG tablet   • Diclofenac Sodium (VOLTAREN) 1 %   • docusate sodium (COLACE) 100 mg capsule   • ergocalciferol (VITAMIN D2) 50,000 units   • formoterol (PERFOROMIST) 20 MCG/2ML nebulizer solution   • guaiFENesin (MUCINEX) 600 mg 12 hr tablet   • hydrOXYzine HCL (ATARAX) 25 mg tablet   • ipratropium-albuterol (DUO-NEB) 0.5-2.5 mg/3 mL nebulizer solution   • Melatonin 5 MG TABS   • predniSONE 10 mg tablet   • rosuvastatin (CRESTOR) 10 MG tablet   • sodium chloride (OCEAN) 0.65 % nasal spray   • tamsulosin (FLOMAX) 0.4 mg   • torsemide (DEMADEX) 20 mg tablet     No Known Allergies    Vitals: Blood pressure 94/55, pulse 70, temperature 98.2 °F (36.8 °C), temperature source Oral, resp. rate (!) 34, SpO2 99 %. On 3 L nasal cannula, There is no height or weight on file to calculate BMI. No intake or output data in the 24 hours ending 07/19/23 1011    Physical Exam  Physical Exam  Constitutional:       Appearance: He is ill-appearing. He is not toxic-appearing or diaphoretic. Comments: Cachectic, somnolent   HENT:      Head: Normocephalic and atraumatic. Right Ear: External ear normal.      Left Ear: External ear normal.      Nose: No congestion or rhinorrhea. Mouth/Throat:      Mouth: Mucous membranes are moist.   Eyes:      Extraocular Movements: Extraocular movements intact. Pupils: Pupils are equal, round, and reactive to light. Cardiovascular:      Rate and Rhythm: Normal rate. Comments: Distant heart sounds  Pulmonary:      Effort: Respiratory distress present.       Breath sounds: Stridor present. Wheezing present. Comments: Barrel chest, diffuse wheezing bilaterally, breathing comfortably on 3 L  Chest:      Chest wall: No tenderness. Abdominal:      General: Abdomen is flat. Palpations: Abdomen is soft. Musculoskeletal:      Cervical back: Normal range of motion and neck supple. Right lower leg: Edema present. Left lower leg: Edema present. Comments: Edema to midshin bilaterally   Skin:     General: Skin is warm and dry. Capillary Refill: Capillary refill takes less than 2 seconds. Neurological:      General: No focal deficit present. Mental Status: He is oriented to person, place, and time. Psychiatric:         Mood and Affect: Mood normal.         Thought Content: Thought content normal.         Judgment: Judgment normal.         Labs: I have personally reviewed pertinent lab results.   Laboratory and Diagnostics  Results from last 7 days   Lab Units 23  0618 23  0245   WBC Thousand/uL 10.45* 11.08*   HEMOGLOBIN g/dL 12.6 12.0   HEMATOCRIT % 37.4 36.1*   PLATELETS Thousands/uL 159 204   NEUTROS PCT %  --  79*   MONOS PCT %  --  12   EOS PCT %  --  0     Results from last 7 days   Lab Units 23  0245   SODIUM mmol/L 123*   POTASSIUM mmol/L 3.3*   CHLORIDE mmol/L 71*   CO2 mmol/L >45*   BUN mg/dL 16   CREATININE mg/dL 0.77   CALCIUM mg/dL 8.8   GLUCOSE RANDOM mg/dL 144*   ALT U/L 23   AST U/L 43   ALK PHOS U/L 64   ALBUMIN g/dL 3.5   TOTAL BILIRUBIN mg/dL 0.82   Procalcitonin: negative (0.05)       Results from last 7 days   Lab Units 23  0618   INR  0.78*   PTT seconds 19*                              Results from last 7 days   Lab Units 23  0828   PROCALCITONIN ng/ml 0.05       VBG:       Imaging and other studies: CXR  pending final read though appears similar to CXR 6/10 which showed emphysema with mild pulmonary venous congestion    Pulmonary function testin: FEV1/FVC 30%, FVC 59% predicted and FEV1 22% predicted    EKG, Pathology, and Other Studies: I have personally reviewed pertinent reports. and I have personally reviewed pertinent films in PACS      Code Status: Level 3 - DNAR and DNI    Kevin Garzon MD  Internal Medicine Residency PGY-I  [address] [facility]: Portions of the record may have been created with voice recognition software. Occasional wrong word or "sound a like" substitutions may have occurred due to the inherent limitations of voice recognition software. Careful consideration should be taken to recognize, using context, where substitutions have occurred.

## 2023-07-19 NOTE — SPEECH THERAPY NOTE
Speech Language/Pathology  Speech-Language Pathology Bedside Swallow Evaluation        Patient Name: Latoya Rose. Today's Date: 7/19/2023     Problem List  Principal Problem:    Acute hypercapnic respiratory failure (HCC)  Active Problems:    Impaired fasting glucose    Goals of care, counseling/discussion    Hyponatremia    Acute on chronic systolic congestive heart failure (HCC)    End stage COPD ([address])         Past Medical History  Past Medical History:   Diagnosis Date   • Acute metabolic encephalopathy 6/03/9194   • Arthritis    • Bladder mass    • Cardiomyopathy [facility])    • Chest pain    • COPD (chronic obstructive pulmonary disease) (HCC)    • COVID-19 virus infection 2/23/2023   • CPAP (continuous positive airway pressure) dependence    • Emphysema of lung (HCC)    • Hypoxia     nocturnal   • Left bundle branch block    • Multiple pulmonary nodules     last assessed: 10/12/16   • Pneumonia    • Sleep apnea    • Sleep apnea, obstructive    • Smoker    • Weight loss 8/29/2019       Past Surgical History  Past Surgical History:   Procedure Laterality Date   • COLONOSCOPY     • CYSTOSCOPY     • CYSTOSCOPY  02/17/2021   • NM BLADDER INSTILLATION ANTICARCINOGENIC AGENT N/A 1/3/2020    Procedure: INSTILLATION MITOMYCIN;  Surgeon: Elba Minor MD;  [facility];  Service: Urology   • NM CYSTO W/REMOVAL OF LESIONS SMALL N/A 1/3/2020    Procedure: TRANSURETHRAL RESECTION OF BLADDER TUMOR (TURBT);   Surgeon: Elba Minor MD;  [facility];  Service: Urology   • NM CYSTOURETHROSCOPY WITH BIOPSY N/A 4/13/2021    Procedure: Tania Polanco;  Surgeon: Elba Minor MD;  [facility];  Service: Urology   • NM TRURL ELECTROSURG RESCJ PROSTATE BLEED COMPLETE N/A 4/13/2021    Procedure: TRANSURETHRAL RESECTION OF PROSTATE (TURP) BLADDER BIOPSY, FULGURATION;  Surgeon: Elba Minor MD;  [facility];  Service: Urology       Summary    Pt presents with oropharyngeal swallow that appears [place]. Mastication is prolonged due to edentulous state, but functional. There were no overt s/s of aspiration. On NC O2 was in the upper 90s during entire session. RR was elevated at times. Pt does typically eat slowly, and takes frequent breaks as needed due to respiratory compromise 2* advanced COPD. Encouraged pt to continue this, taking breaks as needed during meals, consider smaller, more frequent meals, and reviewed aspiration precautions with pt and family. All verbalized understanding. Pt appears appropriate to resume regular diet, while choosing softer foods. ST to follow up for tx. Recommendations:   Diet: regular diet and thin liquids   Meds: whole with liquid   Frequent Oral care: 2x/day  Independent for meals  Aspiration precautions and compensatory swallowing strategies: upright posture, slow rate of feeding, small bites/sips and take breaks as needed to rest, catch breath, smaller, more frequent meals if pt gets fatigued  Other Recommendations/ considerations: ST to see for dysphagia tx/monitor status and diet toleration. Recommend consult with dietician to maximize calorie intake and nutrition. Current Medical Status  Copied from admission/physician notes:  Ramila Alston. is a 79 y.o. male who has past medical history significant for end-stage COPD, stage IV heart failure, combined systolic and diastolic heart failure with LVEF 20%, coronary artery disease who presented to [facility] ER on the early morning hours of 7/19 with symptoms of respiratory distress as well as increased somnolence and decreased appetite over the last several weeks. History obtained from speaking with patient, patient's wife and son. Patient's family numbers were at bedside. They report patient has been increasingly short of breath and confused over the last several weeks. They report that he is also not eating much anymore and no longer very hungry.   They also report that he has lost significant amount of weight. They report that patient has previously had similar symptoms but that the fact that he is not eating much now is very concerning to them. They deny any fevers or chills. Denies any chest pain. Order received and chart reviewed. Discussed with RN. Pt was on bipap, but nurse messaged physician, who gave permission for bipap to come off for eval. Pt placed on NC. Family present for eval. They report pt has poor appetite, and takes breaks while eating due to respiratory issues, but no dysphagia. Pt denies dysphagia. Pt was seen for dysphagia eval by  last month; at that time a regular diet and thin liquids were recommended. Past medical history:   Please see H&P for details      Special Studies:  CXR-pending      Social/Education/Vocational Hx:  Pt lives with family    Swallow Information   Current Risks for Dysphagia & Aspiration: advanced COPD  Current Symptoms/Concerns: change in respiratory status  Current Diet: NPO   Baseline Diet: regular diet and thin liquids    Baseline Assessment   Behavior/Cognition: alert  Speech/Language Status: able to participate in basic conversation and able to follow commands  Patient Positioning: upright in bed     Swallow Mechanism Exam   Facial: symmetrical  Labial: WFL  Lingual: WFL  Velum: symmetrical  Mandible: adequate ROM  Dentition: edentulous  Vocal quality:clear/adequate   Volitional Cough: strong/productive   Respiratory: NC, sats in upper 90s, bipap as needed      Consistencies Assessed and Performance   Consistencies Administered: thin liquids, puree, soft solids and hard solids  -2 oz applesauce, whole banana, lionel crackers, water via straw    Oral Stage: Adequate bolus retrieval and draw from straw. Prolonged but functional mastication with soft and hard solids, due to edentulous state. No residue or pocketing. Pharyngeal Stage: Hyolaryngeal elevation observed and palpated. Swallows appeared prompt.  No overt s/s of aspiration. Esophageal Concerns: none reported      Results Reviewed with: patient, RN, MD and family   Dysphagia Goals: 1. Pt will tolerate regular diet and thin liquids with prompt oropharyngeal swallows and no overt s/s of aspiration. 2. Pt will tolerate LRD without s/s of aspiration.    Discharge recommendation: likely no follow up needed for swallowing    Speech Therapy Prognosis   Prognosis: fair    Prognosis Considerations: age, medical status and respiratory status

## 2023-07-19 NOTE — ASSESSMENT & PLAN NOTE
· See discussion above in respiratory failure section  · Code status deescalated to DNR/DNI  · Palliative consulted for further assistance with goals of care

## 2023-07-19 NOTE — PROGRESS NOTES
Post admit follow-up    1. Acute on chronic HFrEF - IV Lasix, monitor I/O, daily weights. Cardiology input appreciated  2. COPD with acute exacerbation -Solu-Medrol 40 mg IV every 8 hours, azithromycin, Atrovent/Pulmicort/Xopenex nebs. Pulm input appreciated  3. Acute on chronic hyponatremia -with volume overload and likely SIADH. Nephrology input appreciated. Monitor sodium. May get tolvaptan tomorrow  4. Goals of care discussion -discussed by palliative care -family aware of poor prognosis but want to continue DNR/DNI and current treatment  5. KEVIN -BiPAP at bedtime  6. Hyperlipidemia -continue statin  7. Nonobstructive CAD -continue ASA, statin  8. Hyperglycemia -SSI.   Check HbA1c    Discussed with wife at the bedside

## 2023-07-19 NOTE — PLAN OF CARE
Problem: PHYSICAL THERAPY ADULT  Goal: Performs mobility at highest level of function for planned discharge setting. See evaluation for individualized goals. Description: Treatment/Interventions: Functional transfer training, ADL retraining, LE strengthening/ROM, Elevations, Therapeutic exercise, Endurance training, Cognitive reorientation, Patient/family training, Equipment eval/education, Bed mobility, Gait training, Spoke to nursing, Spoke to case management, OT  Equipment Recommended: Maria G Downeye (owns)       See flowsheet documentation for full assessment, interventions and recommendations. Outcome: Progressing  Note: Prognosis: Fair  Problem List: Decreased strength, Impaired balance, Decreased endurance, Decreased mobility, Decreased cognition, Impaired judgement, Decreased safety awareness, Pain  Assessment: Pt is a 79 y.o. male seen for a high complexity PT evaluation s/p admit to [address] [place] [address] on 7/18/23 for acute hypercapnic respiratory failure. Patient  has a past medical history of Acute metabolic encephalopathy, Arthritis, Bladder mass, Cardiomyopathy ([address]), Chest pain, COPD (chronic obstructive pulmonary disease) ([address]), COVID-19 virus infection, CPAP (continuous positive airway pressure) dependence, Emphysema of lung ([address]), Hypoxia, Left bundle branch block, Multiple pulmonary nodules, Pneumonia, Sleep apnea, Sleep apnea, obstructive, Smoker, and Weight loss. .     PT now consulted to assess functional mobility and needs for safe d/c planning. Prior to admission, pt was I with functional mobility, ADLs, but requires assist for IADLs. Personal factors affecting status include limited support, 1SH with 3STE, medical status, O2 dependent, hx of falls. Currently pt requires Supervision for bed mobility, Min A for functional transfers with RW ; Min A for ambulation with RW. Patient limited by fatigue.  Pt presents functioning below baseline and w/ overall mobility deficits 2* to: decreased strength, decreased endurance, decreased mobility, impaired balance. These impairments place pt at risk for falls. Pt will continue to benefit from skilled PT interventions to address stated impairments; to maximize functional potential; for ongoing pt/family education; and DME needs. The patient's AM-PAC Basic Mobility Inpatient Short Form Raw Score Is 16. A Raw score of greater than or equal to 16 suggests the patient may benefit from discharge to home. PT is currently recommending home with HHPT pending adequate support at home on d/c from hospital. Will continue to follow as able. Barriers to Discharge: Inaccessible home environment, Decreased caregiver support     PT Discharge Recommendation: Home with home health rehabilitation (pending adequate support at home)    See flowsheet documentation for full assessment.

## 2023-07-19 NOTE — OCCUPATIONAL THERAPY NOTE
Occupational Therapy Evaluation     Patient Name: Catina Perez. Today's Date: 7/19/2023  Problem List  Principal Problem:    Acute hypercapnic respiratory failure (HCC)  Active Problems:    Impaired fasting glucose    Goals of care, counseling/discussion    Hyponatremia    Acute on chronic systolic congestive heart failure (HCC)    End stage COPD ([address])    Past Medical History  Past Medical History:   Diagnosis Date    Acute metabolic encephalopathy 7/00/1285    Arthritis     Bladder mass     Cardiomyopathy (HCC)     Chest pain     COPD (chronic obstructive pulmonary disease) ([address])     COVID-19 virus infection 2/23/2023    CPAP (continuous positive airway pressure) dependence     Emphysema of lung (HCC)     Hypoxia     nocturnal    Left bundle branch block     Multiple pulmonary nodules     last assessed: 10/12/16    Pneumonia     Sleep apnea     Sleep apnea, obstructive     Smoker     Weight loss 8/29/2019     Past Surgical History  Past Surgical History:   Procedure Laterality Date    COLONOSCOPY      CYSTOSCOPY      CYSTOSCOPY  02/17/2021    RI BLADDER INSTILLATION ANTICARCINOGENIC AGENT N/A 1/3/2020    Procedure: INSTILLATION MITOMYCIN;  Surgeon: Isaias Ybarra MD;  [facility]: [facility];  Service: Urology    RI CYSTO W/REMOVAL OF LESIONS SMALL N/A 1/3/2020    Procedure: TRANSURETHRAL RESECTION OF BLADDER TUMOR (TURBT);   Surgeon: Isaias Ybarra MD;  [facility];  Service: Urology    RI CYSTOURETHROSCOPY WITH BIOPSY N/A 4/13/2021    Procedure: Willam Olszewski;  Surgeon: Isaias Ybarra MD;  [facility];  Service: Urology    RI TRURL ELECTROSURG [place] COMPLETE N/A 4/13/2021    Procedure: TRANSURETHRAL RESECTION OF PROSTATE (TURP) BLADDER BIOPSY, FULGURATION;  Surgeon: Isaias Ybarra MD;  [facility];  Service: Urology           07/19/23 1020   OT Last Visit   OT Visit Date 07/19/23   Note Type   Note type Evaluation   Additional Comments Pt seen w/ PT to increase safety, decrease fall risk, and maximize functional/occupational performance 2* medical complexity which is a regression from pt's functional baseline. Pain Assessment   Pain Assessment Tool 0-10   Pain Score No Pain   Hospital Pain Intervention(s) Repositioned; Ambulation/increased activity; Emotional support   Restrictions/Precautions   Weight Bearing Precautions Per Order No   Other Precautions Cognitive;Multiple lines;Telemetry;O2;Fall Risk  (3 L O2 via NC, condom cath)   Home Living   Type of Home House  (1  with 3 CARLO)   Home Layout One level;Performs ADLs on one level; Able to live on main level with bedroom/bathroom; Access   Bathroom Shower/Tub Tub/shower unit   Bathroom Toilet Standard   Bathroom Equipment Grab bars in shower   Bathroom Accessibility Accessible   Home Equipment Walker;Cane   Additional Comments Pt reports living in a 1  with 3 CARLO with his wife and son; uses RW for functional mobility PTA   Prior Function   Level of [place] Needs assistance with ADLs; Needs assistance with functional mobility; Needs assistance with IADLS   Lives With Spouse; Family; Son   Yolanda Aranda Help From Family   IADLs Family/Friend/Other provides transportation; Family/Friend/Other provides meals; Family/Friend/Other provides medication management   Falls in the last 6 months 1 to 4   Vocational Retired   Comments (-) driving   Lifestyle   Autonomy PTA, pt requires assistance for ADLs/IADLs and uses RW for functional mobility; (-) driving   Reciprocal Relationships Lives with his wife and son   Service to Others Retired   Intrinsic Gratification Enjoys putting together tiny models   General   Family/Caregiver Present Yes   Additional General Comments Pt's wife and son present, supportive/interactive of pt   Subjective   Subjective "Yeah I''ll stand up."   ADL   Where Assessed Edge of bed   Eating Assistance 5  Supervision/Setup   Grooming Assistance 4  Minimal Assistance   [address] 4 Minimal Assistance   LB Bathing Assistance 3  Moderate Assistance   UB Dressing Assistance 4  Minimal Assistance   LB Dressing Assistance 3  Moderate Assistance   Toileting Assistance  3  Moderate Assistance   Functional Assistance 4  Minimal Assistance   Additional Comments Pt's wife reporting that she assists with LB dressing/ADLs prn @ baseline   Bed Mobility   Supine to Sit 5  Supervision   Additional items HOB elevated; Bedrails; Increased time required;Verbal cues   Sit to Supine 5  Supervision   Additional items HOB elevated; Bedrails; Increased time required;Verbal cues   Additional Comments At end of session, pt left lying supine in bed with all functional needs in reach with family present in room   Transfers   Sit to Stand 4  Minimal assistance   Additional items Assist x 1; Increased time required;Verbal cues   Stand to Sit 4  Minimal assistance   Additional items Assist x 1; Increased time required;Verbal cues   Additional Comments w/ RW for safety and support   Functional Mobility   Functional Mobility 4  Minimal assistance   Additional Comments Pt completed few small steps towards HOB with Min A x1 w/ RW for safety and support   Additional items Rolling walker   Balance   Static Sitting Fair   Dynamic Sitting Fair -   Static Standing Fair -   Dynamic Standing Poor +   Ambulatory Poor +   Activity Tolerance   Activity Tolerance Patient tolerated treatment well;Patient limited by fatigue   Medical Staff FIDEL Solis   Nurse Made Aware RN cleared and present during eval   RUE Assessment   RUE Assessment WFL   LUE Assessment   LUE Assessment WFL   Hand Function   Gross Motor Coordination Functional   Fine Motor Coordination Functional   Cognition   Overall Cognitive Status Impaired   Arousal/Participation Responsive;Arousable; Cooperative   Attention Attends with cues to redirect   Orientation Level Oriented X4   Memory Decreased recall of precautions   Following Commands Follows one step commands with increased time or repetition   Comments Pt presenting with increased impulsivity/agitation @ times however receptive with increased education; decreased safety awareness/insight @ times   Assessment   Limitation Decreased ADL status; Decreased Safe judgement during ADL;Decreased cognition;Decreased endurance;Decreased self-care trans;Decreased high-level ADLs   Prognosis Fair   Assessment Pt is a 80 yo male who presented to hospitals with increased somnolence, decreased appetitive, and SOB in which pt dx w/ acute hypercapnic respiratory failure. Pt  has a past medical history of Acute metabolic encephalopathy (7/57/7653), Arthritis, Bladder mass, Cardiomyopathy ([address]), Chest pain, COPD (chronic obstructive pulmonary disease) ([address]), COVID-19 virus infection (2/23/2023), CPAP (continuous positive airway pressure) dependence, Emphysema of lung (HCC), Hypoxia, Left bundle branch block, Multiple pulmonary nodules, Pneumonia, Sleep apnea, Sleep apnea, obstructive, Smoker, and Weight loss (8/29/2019). Pt with active OT orders in which OT consulted to assess pt's functional status and occupational performance to determine safe d/c needs. Pt seen with PT to increase safety, decrease fall risk, and maximize functional/occupational performance 2* medical complexity which is a regression from pt's functional baseline. Pt lives with his wife and son in a 1 story ranch home with 3 CARLO. PTA, pt requires assistance w/ ADLs/IADLs and functional mobility w/ RW. (-) driving. Currently, pt performing bed mobility @ supervision level, functional transfers/mobility w/ Min A x1 w/ RW, UB ADLs w/ Min A, and LB ADLs w/ Mod A however at baseline, pt has assistance with LB ADLs. Pt demonstrates the following limitations/impairments which impact the pt's ability to engage in valued occupations: cognition, balance, endurance/activity tolerance, standing tolerance, functional reach, postural/trunk control, strength, safety awareness, and pain.  From an OT standpoint, recommend discharge to home with home health pending continued functional progress, once medically stable. The patient's raw score on the -PAC Daily Activity Inpatient Short Form is 17. A raw score of less than 19 suggests the patient may benefit from discharge to post-acute rehabilitation services however please refer to the recommendation of the Occupational Therapist for safe discharge planning. Pt would benefit from skilled OT services 2-3x/wk to address immediate acute care needs and underlying performance skills to promote safety, decrease fall risk, and enhance occupational performance to return to [facility]. Goals to be met within the next 10-14 days. Goals   Patient Goals To lay down   LTG Time Frame 10-14   Long Term Goal #1 See OT goals listed below   Plan   Treatment Interventions ADL retraining;Functional transfer training;UE strengthening/ROM; Endurance training;Cognitive reorientation;Patient/family training;Equipment evaluation/education; Compensatory technique education;Continued evaluation; Energy conservation; Activityengagement   Goal Expiration Date 08/02/23   OT Frequency 2-3x/wk   Recommendation   OT Discharge Recommendation Home with home health rehabilitation  (Pending availability/confirmation of support/assistance @ home; in unable to meet current functional needs, then would recommend rehab)   [facility] Daily Activity Inpatient   Lower Body Dressing 2   Bathing 2   Toileting 2   Upper Body Dressing 3   Grooming 4   Eating 4   Daily Activity Raw Score 17   Daily Activity Standardized Score (Calc for Raw Score >=11) 37.26   AM-PAC Applied Cognition Inpatient   Following a Speech/Presentation 3   Understanding Ordinary Conversation 4   Taking Medications 3   Remembering Where Things Are Placed or Put Away 3   Remembering List of 4-5 Errands 3   Taking Care of Complicated Tasks 3   Applied Cognition Raw Score 19   Applied Cognition Standardized Score 39.77   End of Consult   Education Provided Yes;Family or social support of family present for education by provider   Patient Position at End of Consult Supine; All needs within reach   Nurse Communication Nurse aware of consult     OT GOALS:    Pt will improve functional mobility during ADL/IADL/leisure tasks with Mod I using AE/DME prn. Pt will improve activity tolerance/functional endurance during ADL/IADL/leisure tasks for at least 20 minutes to improve occupational performance and engagement in valued occupations using AE/DME prn. Pt will engage in ongoing functional/formal cognitive assessments to assist with safe d/c planning and increase safety during functional tasks. Pt will improve dynamic standing balance for at least 15 minutes with Mod I during functional tasks to decrease fall risk and improve independence and engagement in ADL/IADL/leisure activities. Pt will follow 100% of multi-step commands in ADL/IADL/leisure activities to improve functional cognition used in functional daily routines. Pt will complete functional transfers on and off all surfaces used in daily routines with Mod I for safety to maximize functional/occupational performance. Pt will complete all bed mobility tasks with Mod I to serve as a prerequisite for EOB/OOB ADL/IADL/leisure tasks, optimize positioning/comfort, and increase functional independence. Pt will independently demonstrate good carryover of safety precautions and education/training during ADL/IADL/leisure tasks with energy conservation techniques s/p skilled instruction without verbal cues. Pt will complete UB ADL tasks with Mod I using AE/DME prn to increase functional independence in ADL/IADL/leisure tasks. Pt will complete LB ADL tasks with Min A using AE/DME prn to increase functional independence in ADL/IADL/leisure tasks. Pt will complete toileting tasks with S and good hygiene/thoroughness using AE/DME prn to increase functional independence.     Pt will independently identify and utilize 2-3 positive coping strategies to enhance overall wellbeing and engagement in valued occupations.     Lenore Molina MS, OTR/L

## 2023-07-19 NOTE — ASSESSMENT & PLAN NOTE
· Sodium 123  · Previously had hyponatremia attributed to SIADH as well as heart failure exacerbation  · Patient strict n.p.o. as well as being IV diuresed.   · Monitor sodium

## 2023-07-19 NOTE — ASSESSMENT & PLAN NOTE
Wt Readings from Last 3 Encounters:   06/26/23 43.8 kg (96 lb 8 oz)   06/16/23 43.1 kg (95 lb)   06/12/23 47.8 kg (105 lb 4.8 oz)       · Bilateral lower extremity edema in setting of respiratory failure  · Echo in May 2023 with LVEF 20%   · On PTA torsemide  · Start Lasix IV 40 mg twice daily in setting of respiratory failure. Noted hyponatremia but given patient's severe shortness of breath, will monitor.

## 2023-07-19 NOTE — H&P
[address] [facility]  H&P  Name: Andrei Ugarte. 79 y.o. male I MRN: [id number]  Unit/Bed#: [facility] 510-01 I Date of Admission: 7/19/2023   Date of Service: 7/19/2023 I Hospital Day: 0      Assessment/Plan   * Acute hypercapnic respiratory failure (HCC)  Assessment & Plan  · Shortness of breath, wheezing, somnolence  · History of end-stage COPD, acute on chronic combined systolic and diastolic heart failure  · Severe cachexia, diffuse x-ray wheezing on exam, somnolent but arousable at times and then falls back asleep, bilateral pitting lower extremity edema  · Chest x-ray with evidence of enlarged lung fields consistent with underlying COPD, no obvious infiltrate or effusion per my read official read  · Tachypneic in ER room, CO2 on metabolic panel greater than 45, awaiting VBG which was ordered  · Spoke with patient as well as wife and son at bedside regarding my concerns with his end-stage COPD and clinical condition. We also discussed the patient has not been eating much recently and concerned that his end-stage COPD as well as end-stage heart failure have progressed. Patient, daughter and son at bedside understanding of concerns and after prolonged conversation in the ER, code status switched from full code to DNR/DNI. We also spoke about also care to focus on comfort only but patient as well as wife and son at bedside would like to hold off on this for now and have further discussions. · Admit to medicine on telemetry with BiPAP overnight. Will treat multifactorial respiratory failure with IV steroids, scheduled nebulizers, IV Lasix 40 mg twice daily. With respect to infection, patient afebrile and WBC 11 K. Suspicion for infection is lower on differential currently but as part of COPD treatment we will add doxycycline and order procalcitonin. If procalcitonin elevated can consider add addition of ceftriaxone.   · Given patient's severe cachexia and weakness, and on BiPAP, n.p.o. We will consult speech for swallow eval.  · Spoke with patient as well as wife and son at bedside regarding continued discussions of goals of care and as such we will consult our palliative colleagues for their assistance on case this patient has previously been following with palliative care. End stage COPD ([address])  Assessment & Plan  · See respiratory failure section above    Acute on chronic systolic congestive heart failure [facility])  Assessment & Plan  Wt Readings from Last 3 Encounters:   06/26/23 43.8 kg (96 lb 8 oz)   06/16/23 43.1 kg (95 lb)   06/12/23 47.8 kg (105 lb 4.8 oz)       · Bilateral lower extremity edema in setting of respiratory failure  · Echo in May 2023 with LVEF 20%   · On PTA torsemide  · Start Lasix IV 40 mg twice daily in setting of respiratory failure. Noted hyponatremia but given patient's severe shortness of breath, will monitor. Hyponatremia  Assessment & Plan  · Sodium 123  · Previously had hyponatremia attributed to SIADH as well as heart failure exacerbation  · Patient strict n.p.o. as well as being IV diuresed. · Monitor sodium    Goals of care, counseling/discussion  Assessment & Plan  · See discussion above in respiratory failure section  · Code status deescalated to DNR/DNI  · Palliative consulted for further assistance with goals of care    Impaired fasting glucose  Assessment & Plan  · Sliding scale insulin while hospitalized             VTE Prophylaxis: Heparin  / sequential compression device   Code Status: Level 3 - DNAR and DNI       Anticipated Length of Stay:  Patient will be admitted on an Inpatient basis with an anticipated length of stay of  > 2 midnights. Justification for Hospital Stay: Please see detailed plans noted above.     Chief Complaint:     Respiratory failure, cachexia, fatigue, somnolence  History of Present Illness:  Liliana Mckinney is a 79 y.o. male who has past medical history significant for end-stage COPD, stage IV heart failure, combined systolic and diastolic heart failure with LVEF 20%, coronary artery disease who presented to [address] [place] [address] ER on the early morning hours of 7/19 with symptoms of respiratory distress as well as increased somnolence and decreased appetite over the last several weeks. History obtained from speaking with patient, patient's wife and son. Patient's family numbers were at bedside. They report patient has been increasingly short of breath and confused over the last several weeks. They report that he is also not eating much anymore and no longer very hungry. They also report that he has lost significant amount of weight. They report that patient has previously had similar symptoms but that the fact that he is not eating much now is very concerning to them. They deny any fevers or chills. Denies any chest pain. Review of Systems:    Constitutional:  Denies fever or chills. Positive for fatigue. Positive for anorexia  Eyes:  Denies change in visual acuity   HENT:  Denies nasal congestion or sore throat   Respiratory: Positive for shortness of breath, wheezing  Cardiovascular:  Denies chest pain or edema   GI:  Denies abdominal pain or bloody stools  :  Denies dysuria   Musculoskeletal:  Denies back pain or joint pain   Integument:  Denies rash   Neurologic: Positive for confusion.   Denies headache or sensory changes   Endocrine:  Denies polyuria or polydipsia   Lymphatic:  Denies swollen glands   Psychiatric:  Denies depression or anxiety     Past Medical and Surgical History:   Past Medical History:   Diagnosis Date   • Acute metabolic encephalopathy 9/29/6507   • Arthritis    • Bladder mass    • Cardiomyopathy [facility])    • Chest pain    • COPD (chronic obstructive pulmonary disease) ([facility])    • COVID-19 virus infection 2/23/2023   • CPAP (continuous positive airway pressure) dependence    • Emphysema of lung ([facility])    • Hypoxia     nocturnal   • Left bundle branch block    • Multiple pulmonary nodules     last assessed: 10/12/16   • Pneumonia    • Sleep apnea    • Sleep apnea, obstructive    • Smoker    • Weight loss 8/29/2019     Past Surgical History:   Procedure Laterality Date   • COLONOSCOPY     • CYSTOSCOPY     • CYSTOSCOPY  02/17/2021   • CT BLADDER INSTILLATION ANTICARCINOGENIC AGENT N/A 1/3/2020    Procedure: INSTILLATION MITOMYCIN;  Surgeon: Santa Contreras MD;  Location: [facility];  Service: Urology   • CT CYSTO W/REMOVAL OF LESIONS SMALL N/A 1/3/2020    Procedure: TRANSURETHRAL RESECTION OF BLADDER TUMOR (TURBT);   Surgeon: Santa Contreras MD;  [facility];  Service: Urology   • CT CYSTOURETHROSCOPY WITH BIOPSY N/A 4/13/2021    Procedure: CYSTOSCOPY WITH BIOPSIES;  Surgeon: Santa Contreras MD;  [facility];  Service: Urology   • CT TRURL ELECTROSURG [address] PROSTATE BLEED COMPLETE N/A 4/13/2021    Procedure: TRANSURETHRAL RESECTION OF PROSTATE (TURP) BLADDER BIOPSY, FULGURATION;  Surgeon: Santa Contreras MD;  [facility];  Service: Urology       Meds/Allergies:  Medications Prior to Admission   Medication Sig Dispense Refill Last Dose   • acetaminophen (TYLENOL) 325 mg tablet Take 3 tablets (975 mg total) by mouth every 8 (eight) hours as needed for mild pain or moderate pain  0    • albuterol (PROVENTIL HFA,VENTOLIN HFA) 90 mcg/act inhaler Inhale 2 puffs every 6 (six) hours as needed for wheezing 8.5 g 2    • aspirin 81 mg chewable tablet Chew 1 tablet (81 mg total) daily 30 tablet 0    • budesonide (PULMICORT) 0.5 mg/2 mL nebulizer solution TAKE 2 ML (0.5 MG TOTAL) BY NEBULIZATION TWICE A DAY RINSE MOUTH AFTER  mL 2    • chlorhexidine (PERIDEX) 0.12 % solution Apply 15 mL to the mouth or throat every 12 (twelve) hours 120 mL 0    • cyanocobalamin (VITAMIN B-12) 1000 MCG tablet Take 1 tablet (1,000 mcg total) by mouth daily 30 tablet 0    • Diclofenac Sodium (VOLTAREN) 1 % APPLY 2 GRAMS 4 TIMES A DAY      • docusate sodium (COLACE) 100 mg capsule Take 100 mg by mouth 2 (two) times a day (Patient not taking: Reported on 2023)      • ergocalciferol (VITAMIN D2) 50,000 units TAKE 1 CAPSULE (50,000 UNITS TOTAL) BY MOUTH EVERY 28 DAYS 3 capsule 1    • formoterol (PERFOROMIST) 20 MCG/2ML nebulizer solution TAKE 2 ML (20 MCG TOTAL) BY NEBULIZATION 2 (TWO) TIMES A DAY      • guaiFENesin (MUCINEX) 600 mg 12 hr tablet Take 2 tablets (1,200 mg total) by mouth every 12 (twelve) hours 60 tablet 6    • hydrOXYzine HCL (ATARAX) 25 mg tablet Take 1 tablet (25 mg total) by mouth every 6 (six) hours as needed for anxiety (Patient not taking: Reported on 2023) 30 tablet 0    • ipratropium-albuterol (DUO-NEB) 0.5-2.5 mg/3 mL nebulizer solution       • Melatonin 5 MG TABS Take 1 tablet by mouth daily at bedtime      • predniSONE 10 mg tablet Take 4 tablets (40 mg total) by mouth daily Reduce dose by 10 mg every 5 days  until you reach your regular dosing of steroids 5 mg daily per your lung doctro Do not start before 2023. 50 tablet 0    • rosuvastatin (CRESTOR) 10 MG tablet Take 1 tablet (10 mg total) by mouth daily 90 tablet 3    • sodium chloride (OCEAN) 0.65 % nasal spray 1 spray into each nostril every hour as needed for congestion 30 mL 0    • tamsulosin (FLOMAX) 0.4 mg Take 1 capsule (0.4 mg total) by mouth daily with dinner  0    • torsemide (DEMADEX) 20 mg tablet Take 1 tablet (20 mg total) by mouth daily 90 tablet 3        Allergies: No Known Allergies    History:  Marital Status: /Civil Union     Substance Use History:   Social History     Substance and Sexual Activity   Alcohol Use Not Currently    Comment: rarely     Social History     Tobacco Use   Smoking Status Former   • Packs/day: 2.50   • Years: 42.00   • Total pack years: 105.00   • Types: Cigarettes   • Start date: 5   • Quit date:    • Years since quittin.5   Smokeless Tobacco Former   Tobacco Comments    1 ppd for 37 years, 2010 down to 5 cigs a day, is around second hand smoke     Social History     Substance and Sexual Activity   Drug Use No       Family History:  Family History   Problem Relation Age of Onset   • Diabetes Mother        Physical Exam:     Vitals:   Blood Pressure: 125/56 (07/19/23 0553)  Pulse: 88 (07/19/23 0553)  Temperature: (!) 97.3 °F (36.3 °C) (07/19/23 0553)  Temp Source: Oral (07/19/23 0432)  Respirations: (!) 29 (07/19/23 0553)  SpO2: 100 % (07/19/23 0553)    Constitutional:  Somnolent but arousable and oriented to person, place, time. Severe Cachexia  Eyes:  EOMI, No scleral icterus   HENT:   oropharynx dry, external ears normal, external nose normal   Respiratory: Tachypnea, severe expiratory wheezing  Cardiovascular:  Normal rate, no murmurs   GI:  Soft, nondistended, no guarding   :  No costovertebral angle tenderness   Musculoskeletal: Severe cachexia of the extremities  Integument:  no jaundice, no rash   Neurologic: Somnolent but arousable and when arousable oriented x3,, CN 2-12 normal,  no focal deficits noted         Lab Results: I have personally reviewed pertinent reports. Results from last 7 days   Lab Units 07/19/23  0245   WBC Thousand/uL 11.08*   HEMOGLOBIN g/dL 12.0   HEMATOCRIT % 36.1*   PLATELETS Thousands/uL 204   NEUTROS PCT % 79*   LYMPHS PCT % 8*   MONOS PCT % 12   EOS PCT % 0     Results from last 7 days   Lab Units 07/19/23  0245   POTASSIUM mmol/L 3.3*   CHLORIDE mmol/L 71*   CO2 mmol/L >45*   BUN mg/dL 16   CREATININE mg/dL 0.77   CALCIUM mg/dL 8.8   ALK PHOS U/L 64   ALT U/L 23   AST U/L 43             Imaging: I have personally reviewed pertinent reports. No results found. Total time for visit, including counseling/coordination of care: 60 minutes. Greater than 50% of this total time spent on direct patient counseling and coorination of care. Epic Records Reviewed as well as Records in Care Everywhere    ** Please Note: Dragon 360 Dictation voice to text software was used in the creation of this document.  **

## 2023-07-19 NOTE — PHYSICAL THERAPY NOTE
PHYSICAL THERAPY NOTE          Patient Name: Juan Rubio Today's Date: 7/19/2023 07/19/23 1040   PT Last Visit   PT Visit Date 07/19/23   Note Type   Note type Evaluation   Pain Assessment   Pain Assessment Tool 0-10   Pain Score No Pain   Restrictions/Precautions   Weight Bearing Precautions Per Order No   Other Precautions Cognitive;Multiple lines;Telemetry;O2;Fall Risk  (3 L O2 via NC, condom cath)   Home Living   Type of Home House  ([address])   Home Layout One level;Performs ADLs on one level; Able to live on main level with bedroom/bathroom; Access;Stairs to enter with rails   Bathroom Shower/Tub Tub/shower unit   Bathroom Toilet Standard   Bathroom Equipment Grab bars in shower   Bathroom Accessibility Accessible   Home Equipment Walker;Cane   Additional Comments Pt reports living in a 1 SH with [address] [place] with his wife and son; uses RW for functional mobility PTA   Prior Function   Level of [place] Needs assistance with ADLs; Needs assistance with functional mobility; Needs assistance with IADLS   Lives With Spouse; Family; Son   [address] Help From Family   IADLs Family/Friend/Other provides transportation; Family/Friend/Other provides meals; Family/Friend/Other provides medication management   Falls in the last 6 months 1 to 4   Vocational Retired   [employer]   Overall Cognitive Status Impaired   Arousal/Participation Alert   Attention Attends with cues to redirect   Orientation Level Oriented X4   Memory Decreased recall of precautions   Following Commands Follows one step commands with increased time or repetition   Comments Pt presenting with increased impulsivity/agitation @ times however receptive with increased education   Subjective   Subjective Patient agreeable to therapy with encouragement   RUE Assessment   RUE Assessment WFL   LUE Assessment   LUE Assessment WFL   RLE Assessment   RLE Assessment WFL  (4-/5 grossly)   LLE Assessment   LLE Assessment WFL  (4-/5 grossly)   Bed Mobility   Supine to Sit 5  Supervision   Additional items HOB elevated; Increased time required;Verbal cues; Bedrails   Sit to Supine 5  Supervision   Additional items HOB elevated; Bedrails; Increased time required;Verbal cues   Additional Comments In bed on arrival, remains in bed following activity. Refusal of OOB to chair   Transfers   Sit to Stand 4  Minimal assistance   Additional items Assist x 1; Increased time required;Verbal cues   Stand to Sit 4  Minimal assistance   Additional items Assist x 1; Increased time required;Verbal cues   Additional Comments RW   Ambulation/Elevation   Gait pattern Decreased foot clearance;Shuffling; Foward flexed; Short stride; Excessively slow;Knees flexed   Gait Assistance 4  Minimal assist   Additional items Assist x 1;Verbal cues   Assistive Device Rolling walker   Distance 2'  (to [facility])   Stair Management Assistance Not tested   Balance   Static Sitting Fair   Dynamic Sitting Fair -   Static Standing Fair -   Dynamic Standing Poor +   Ambulatory Poor +   Endurance Deficit   Endurance Deficit Yes   Endurance Deficit Description fluctuating O2 between 87-94% throughout rest and activity on supplementary O2 via NC   Activity Tolerance   Activity Tolerance Patient tolerated treatment well;Patient limited by fatigue   Medical Staff Made Aware MELONY Burns   Nurse Made Aware RN cleared   Assessment   Prognosis Fair   Problem List Decreased strength; Impaired balance;Decreased endurance;Decreased mobility; Decreased cognition; Impaired judgement;Decreased safety awareness;Pain   Assessment Pt is a 79 y.o. male seen for a high complexity PT evaluation s/p admit to [address] [place] [address] on 7/18/23 for acute hypercapnic respiratory failure.  Patient  has a past medical history of Acute metabolic encephalopathy, Arthritis, Bladder mass, Cardiomyopathy ([address]), Chest pain, COPD (chronic obstructive pulmonary disease) ([address]), COVID-19 virus infection, CPAP (continuous positive airway pressure) dependence, Emphysema of lung ([address]), Hypoxia, Left bundle branch block, Multiple pulmonary nodules, Pneumonia, Sleep apnea, Sleep apnea, obstructive, Smoker, and Weight loss. .     PT now consulted to assess functional mobility and needs for safe d/c planning. Prior to admission, pt was I with functional mobility, ADLs, but requires assist for IADLs. Personal factors affecting status include limited support, 1SH with 3STE, medical status, O2 dependent, hx of falls. Currently pt requires Supervision for bed mobility, Min A for functional transfers with RW ; Min A for ambulation with RW. Patient limited by fatigue. Pt presents functioning below baseline and w/ overall mobility deficits 2* to: decreased strength, decreased endurance, decreased mobility, impaired balance. These impairments place pt at risk for falls. Pt will continue to benefit from skilled PT interventions to address stated impairments; to maximize functional potential; for ongoing pt/family education; and DME needs. The patient's AM-PAC Basic Mobility Inpatient Short Form Raw Score Is 16. A Raw score of greater than or equal to 16 suggests the patient may benefit from discharge to home. PT is currently recommending home with HHPT pending adequate support at home on d/c from hospital. Will continue to follow as able.    Barriers to Discharge Inaccessible home environment;Decreased caregiver support   Goals   Patient Goals to rest   STG Expiration Date 08/02/23   Short Term Goal #1 In 14 days, patient will    1) increase strength in BUE/BLE by 1/2 to 1 full grade for increased strength and stability needed for functional mobility 2) improve bed mobility to MI for improved mobility and decreased need for assist 3) increase functional transfers to MI for improved safety and functional mobility 4) ambulate 100ft with MI using RW for increased endurance and safety ambulating home and community environments 5) improve balance by 1 grade for improved safety and stability and decreased risk for falls. 6) ascend/descend 3 stairs using HR with MI in order to safely enter home   PT Treatment Day 0   Plan   Treatment/Interventions Functional transfer training;ADL retraining;LE strengthening/ROM; Elevations; Therapeutic exercise; Endurance training;Cognitive reorientation;Patient/family training;Equipment eval/education; Bed mobility;Gait training;Spoke to nursing;Spoke to case management;OT   PT Frequency 3-5x/wk   Recommendation   PT Discharge Recommendation Home with home health rehabilitation  (pending adequate support at home)   Equipment Recommended Saira Clark  (owns)   [address] in Flat Bed Without Bedrails 3   Lying on Back to Sitting on Edge of Flat Bed Without Bedrails 3   Moving Bed to Chair 3   Standing Up From Chair Using Arms 3   Walk in Room 3   Climb 3-5 Stairs With Railing 1   Basic Mobility Inpatient Raw Score 16   Basic Mobility Standardized Score 38.32   Highest Level Of Mobility   JH-HLM Goal 5: Stand one or more mins   JH-HLM Achieved 4: Move to chair/commode   Modified Rosio Scale   Modified Holt Scale 4   End of Consult   Patient Position at End of Consult Supine; All needs within reach       Dilcia Hardin, PT, DPT

## 2023-07-20 PROBLEM — G93.41 METABOLIC ENCEPHALOPATHY: Status: ACTIVE | Noted: 2023-07-20

## 2023-07-20 PROBLEM — R06.89 HYPERCAPNIA: Chronic | Status: ACTIVE | Noted: 2023-07-20

## 2023-07-20 PROBLEM — J44.1 COPD WITH ACUTE EXACERBATION (HCC): Status: ACTIVE | Noted: 2023-07-19

## 2023-07-20 LAB
ALBUMIN SERPL BCP-MCNC: 3.2 G/DL (ref 3.5–5)
ALP SERPL-CCNC: 57 U/L (ref 46–116)
ALT SERPL W P-5'-P-CCNC: 20 U/L (ref 12–78)
ARTERIAL PATENCY WRIST A: NO
ARTERIAL PATENCY WRIST A: YES
AST SERPL W P-5'-P-CCNC: 31 U/L (ref 5–45)
BASE EX.OXY STD BLDV CALC-SCNC: 76.9 % (ref 60–80)
BASE EXCESS BLDA CALC-SCNC: 23.3 MMOL/L
BASE EXCESS BLDV CALC-SCNC: 17.4 MMOL/L
BILIRUB SERPL-MCNC: 0.75 MG/DL (ref 0.2–1)
BUN SERPL-MCNC: 19 MG/DL (ref 5–25)
CALCIUM ALBUM COR SERPL-MCNC: 9.6 MG/DL (ref 8.3–10.1)
CALCIUM SERPL-MCNC: 9 MG/DL (ref 8.3–10.1)
CHLORIDE SERPL-SCNC: 73 MMOL/L (ref 96–108)
CO2 SERPL-SCNC: >45 MMOL/L (ref 21–32)
CORTIS SERPL-MCNC: 4.9 UG/DL
CREAT SERPL-MCNC: 0.78 MG/DL (ref 0.6–1.3)
EST. AVERAGE GLUCOSE BLD GHB EST-MCNC: 117 MG/DL
GFR SERPL CREATININE-BSD FRML MDRD: 94 ML/MIN/1.73SQ M
GLUCOSE SERPL-MCNC: 116 MG/DL (ref 65–140)
GLUCOSE SERPL-MCNC: 132 MG/DL (ref 65–140)
GLUCOSE SERPL-MCNC: 144 MG/DL (ref 65–140)
GLUCOSE SERPL-MCNC: 160 MG/DL (ref 65–140)
GLUCOSE SERPL-MCNC: 288 MG/DL (ref 65–140)
HBA1C MFR BLD: 5.7 %
HCO3 BLDA-SCNC: 51.8 MMOL/L (ref 22–28)
HCO3 BLDV-SCNC: 48 MMOL/L (ref 24–30)
MAGNESIUM SERPL-MCNC: 2.3 MG/DL (ref 1.6–2.6)
NASAL CANNULA: ABNORMAL
O2 CT BLDA-SCNC: 15.8 ML/DL (ref 16–23)
O2 CT BLDV-SCNC: 13.8 ML/DL
OXYHGB MFR BLDA: 94.2 % (ref 94–97)
PCO2 BLDA: 78.4 MM HG (ref 36–44)
PCO2 BLDV: 95.4 MM HG (ref 42–50)
PH BLDA: 7.44 [PH] (ref 7.35–7.45)
PH BLDV: 7.32 [PH] (ref 7.3–7.4)
PO2 BLDA: 75.7 MM HG (ref 75–129)
PO2 BLDV: 47.4 MM HG (ref 35–45)
POTASSIUM SERPL-SCNC: 3.5 MMOL/L (ref 3.5–5.3)
PROCALCITONIN SERPL-MCNC: 0.06 NG/ML
PROT SERPL-MCNC: 6 G/DL (ref 6.4–8.4)
SODIUM SERPL-SCNC: 126 MMOL/L (ref 135–147)
SPECIMEN SOURCE: ABNORMAL

## 2023-07-20 PROCEDURE — 83036 HEMOGLOBIN GLYCOSYLATED A1C: CPT | Performed by: INTERNAL MEDICINE

## 2023-07-20 PROCEDURE — 84145 PROCALCITONIN (PCT): CPT | Performed by: INTERNAL MEDICINE

## 2023-07-20 PROCEDURE — 83735 ASSAY OF MAGNESIUM: CPT | Performed by: INTERNAL MEDICINE

## 2023-07-20 PROCEDURE — 99232 SBSQ HOSP IP/OBS MODERATE 35: CPT | Performed by: INTERNAL MEDICINE

## 2023-07-20 PROCEDURE — 99233 SBSQ HOSP IP/OBS HIGH 50: CPT | Performed by: INTERNAL MEDICINE

## 2023-07-20 PROCEDURE — 92526 ORAL FUNCTION THERAPY: CPT

## 2023-07-20 PROCEDURE — 94640 AIRWAY INHALATION TREATMENT: CPT

## 2023-07-20 PROCEDURE — 99232 SBSQ HOSP IP/OBS MODERATE 35: CPT | Performed by: STUDENT IN AN ORGANIZED HEALTH CARE EDUCATION/TRAINING PROGRAM

## 2023-07-20 PROCEDURE — 94660 CPAP INITIATION&MGMT: CPT

## 2023-07-20 PROCEDURE — 36600 WITHDRAWAL OF ARTERIAL BLOOD: CPT

## 2023-07-20 PROCEDURE — 82805 BLOOD GASES W/O2 SATURATION: CPT

## 2023-07-20 PROCEDURE — 94760 N-INVAS EAR/PLS OXIMETRY 1: CPT

## 2023-07-20 PROCEDURE — 80053 COMPREHEN METABOLIC PANEL: CPT | Performed by: INTERNAL MEDICINE

## 2023-07-20 PROCEDURE — 97530 THERAPEUTIC ACTIVITIES: CPT

## 2023-07-20 PROCEDURE — 82533 TOTAL CORTISOL: CPT | Performed by: INTERNAL MEDICINE

## 2023-07-20 PROCEDURE — 82948 REAGENT STRIP/BLOOD GLUCOSE: CPT

## 2023-07-20 PROCEDURE — 94664 DEMO&/EVAL PT USE INHALER: CPT

## 2023-07-20 PROCEDURE — 82805 BLOOD GASES W/O2 SATURATION: CPT | Performed by: INTERNAL MEDICINE

## 2023-07-20 RX ORDER — METHYLPREDNISOLONE SODIUM SUCCINATE 40 MG/ML
40 INJECTION, POWDER, LYOPHILIZED, FOR SOLUTION INTRAMUSCULAR; INTRAVENOUS EVERY 12 HOURS SCHEDULED
Status: COMPLETED | OUTPATIENT
Start: 2023-07-20 | End: 2023-07-20

## 2023-07-20 RX ORDER — PREDNISONE 20 MG/1
60 TABLET ORAL DAILY
Status: DISCONTINUED | OUTPATIENT
Start: 2023-07-21 | End: 2023-07-21 | Stop reason: HOSPADM

## 2023-07-20 RX ORDER — PREDNISONE 20 MG/1
20 TABLET ORAL DAILY
Status: DISCONTINUED | OUTPATIENT
Start: 2023-08-02 | End: 2023-07-21 | Stop reason: HOSPADM

## 2023-07-20 RX ORDER — POTASSIUM CHLORIDE 20 MEQ/1
20 TABLET, EXTENDED RELEASE ORAL ONCE
Status: COMPLETED | OUTPATIENT
Start: 2023-07-20 | End: 2023-07-20

## 2023-07-20 RX ORDER — PREDNISONE 10 MG/1
10 TABLET ORAL DAILY
Status: DISCONTINUED | OUTPATIENT
Start: 2023-08-05 | End: 2023-07-21 | Stop reason: HOSPADM

## 2023-07-20 RX ORDER — TOLVAPTAN 15 MG/1
15 TABLET ORAL ONCE
Status: COMPLETED | OUTPATIENT
Start: 2023-07-20 | End: 2023-07-20

## 2023-07-20 RX ORDER — PREDNISONE 20 MG/1
40 TABLET ORAL DAILY
Status: DISCONTINUED | OUTPATIENT
Start: 2023-07-27 | End: 2023-07-21 | Stop reason: HOSPADM

## 2023-07-20 RX ADMIN — POTASSIUM CHLORIDE 20 MEQ: 1500 TABLET, EXTENDED RELEASE ORAL at 09:16

## 2023-07-20 RX ADMIN — AZITHROMYCIN MONOHYDRATE 500 MG: 250 TABLET ORAL at 17:19

## 2023-07-20 RX ADMIN — IPRATROPIUM BROMIDE 0.5 MG: 0.5 SOLUTION RESPIRATORY (INHALATION) at 19:31

## 2023-07-20 RX ADMIN — INSULIN LISPRO 1 UNITS: 100 INJECTION, SOLUTION INTRAVENOUS; SUBCUTANEOUS at 22:14

## 2023-07-20 RX ADMIN — POTASSIUM CHLORIDE 20 MEQ: 1500 TABLET, EXTENDED RELEASE ORAL at 17:19

## 2023-07-20 RX ADMIN — FUROSEMIDE 40 MG: 10 INJECTION, SOLUTION INTRAMUSCULAR; INTRAVENOUS at 17:19

## 2023-07-20 RX ADMIN — LEVALBUTEROL HYDROCHLORIDE 1.25 MG: 1.25 SOLUTION RESPIRATORY (INHALATION) at 19:33

## 2023-07-20 RX ADMIN — BUDESONIDE 0.5 MG: 0.5 INHALANT ORAL at 07:15

## 2023-07-20 RX ADMIN — POTASSIUM CHLORIDE 20 MEQ: 1500 TABLET, EXTENDED RELEASE ORAL at 09:15

## 2023-07-20 RX ADMIN — ASPIRIN 81 MG CHEWABLE TABLET 81 MG: 81 TABLET CHEWABLE at 09:15

## 2023-07-20 RX ADMIN — METHYLPREDNISOLONE SODIUM SUCCINATE 40 MG: 40 INJECTION, POWDER, FOR SOLUTION INTRAMUSCULAR; INTRAVENOUS at 22:04

## 2023-07-20 RX ADMIN — FUROSEMIDE 40 MG: 10 INJECTION, SOLUTION INTRAMUSCULAR; INTRAVENOUS at 09:15

## 2023-07-20 RX ADMIN — HEPARIN SODIUM 5000 UNITS: 5000 INJECTION INTRAVENOUS; SUBCUTANEOUS at 06:06

## 2023-07-20 RX ADMIN — IPRATROPIUM BROMIDE 0.5 MG: 0.5 SOLUTION RESPIRATORY (INHALATION) at 13:36

## 2023-07-20 RX ADMIN — LEVALBUTEROL HYDROCHLORIDE 1.25 MG: 1.25 SOLUTION RESPIRATORY (INHALATION) at 07:15

## 2023-07-20 RX ADMIN — LEVALBUTEROL HYDROCHLORIDE 1.25 MG: 1.25 SOLUTION RESPIRATORY (INHALATION) at 13:36

## 2023-07-20 RX ADMIN — BUDESONIDE 0.5 MG: 0.5 INHALANT ORAL at 19:33

## 2023-07-20 RX ADMIN — GUAIFENESIN 1200 MG: 600 TABLET, EXTENDED RELEASE ORAL at 09:16

## 2023-07-20 RX ADMIN — HEPARIN SODIUM 5000 UNITS: 5000 INJECTION INTRAVENOUS; SUBCUTANEOUS at 22:12

## 2023-07-20 RX ADMIN — METHYLPREDNISOLONE SODIUM SUCCINATE 40 MG: 40 INJECTION, POWDER, FOR SOLUTION INTRAMUSCULAR; INTRAVENOUS at 06:06

## 2023-07-20 RX ADMIN — IPRATROPIUM BROMIDE 0.5 MG: 0.5 SOLUTION RESPIRATORY (INHALATION) at 07:15

## 2023-07-20 RX ADMIN — INSULIN LISPRO 2 UNITS: 100 INJECTION, SOLUTION INTRAVENOUS; SUBCUTANEOUS at 11:20

## 2023-07-20 RX ADMIN — TOLVAPTAN 15 MG: 15 TABLET ORAL at 22:04

## 2023-07-20 RX ADMIN — GUAIFENESIN 1200 MG: 600 TABLET, EXTENDED RELEASE ORAL at 22:04

## 2023-07-20 RX ADMIN — HEPARIN SODIUM 5000 UNITS: 5000 INJECTION INTRAVENOUS; SUBCUTANEOUS at 14:12

## 2023-07-20 NOTE — PROGRESS NOTES
NEPHROLOGY HOSPITAL PROGRESS NOTE   Latonia Dey 79 y.o. male MRN: [id number]  Unit/Bed#: [facility] 510-01 Encounter: [id number]  Reason for Consult: Hyponatremia    ASSESSMENT and PLAN:  Latonia Dey is a 79 y.o. male with a past medical history of end-stage COPD, CHF, CAD and hyponatremia who was admitted to [facility] after presenting with change in appetite, confusion, increased respiratory distress. Nephrology consulted for management of chronic hyponatremia. Acute on chronic hypoosmolar hyponatremia:  • Etiology: Volume mediated, +/- SIADH. ? Generally managed with fluid restriction (home fluid restriction at 2 L) and loop diuretic. Required higher than usual dose of tolvaptan during recent hospitalization. Unfortunately unable to maintain tolvaptan for home use due to cost.  ? Poor oral intake at home. Eating soup and Jell-O  • Acute decline in sodium to 123 on admission 7/19. • Management: Loop diuretic, fluid restriction, increase solute intake with the addition of nutrition drink. Treat hypokalemia  • Sodium level improving, up to 126. • Work-up:   • Serum osmolality 273, urine osmolality 261, urine sodium 38. Receiving loop diuretic. TSH normal  • Cortisol pending (Recent level 2.3 but level was drawn at the incorrect time), TSH normal.  Recent uric acid 2.3 consistent with SIADH. Recent serum osmolality low 272. • Plan/recommendations:  ? No changes at this time. ? Encourage oral intake. Encourage nutrition drink  ? Continue potassium replacement     Hypokalemia:  • Improved. On p.o. dose twice daily with diuretic     Acute on chronic hypoxic/hypercapnic respiratory failure/end-stage COPD:  • Oxygen dependent-son reports that home oxygen is generally at 3 L  • Elevated bicarb, compensatory likely. Last VBG pH 7.33 on 7/19  • On nasal cannula oxygen. Initially required BiPAP support.   • Chest x-ray: Mild pulmonary vascular congestion  • Continue IV diuretic, supportive care     Acute on chronic HFrEF:  • Ejection fraction 20%  • Continue IV diuretic twice daily  • Stanley catheter in place since 7/19.     Anemia:  • History of iron deficiency  • Hemoglobin acceptable, 12.6     End-stage disease:  • Palliative following  • DNR/DNI    DISPOSITION:  No changes at this time. Plan discussed with Dr. Geoff Mercado. SUBJECTIVE / 24H INTERVAL HISTORY:  Family in attendance. No complaints. Oral intake has improved. Less lethargic. OBJECTIVE:  Current Weight: Weight - Scale: 47.2 kg (104 lb)  Vitals:    07/20/23 0715 07/20/23 0717 07/20/23 0736 07/20/23 0753   BP:   102/63    BP Location:   Right arm    Pulse:   80    Resp:   20    Temp:   (!) 97.4 °F (36.3 °C)    TempSrc:   Axillary    SpO2: 95% 95% 94% 93%   Weight:           Intake/Output Summary (Last 24 hours) at 7/20/2023 1117  Last data filed at 7/20/2023 0815  Gross per 24 hour   Intake 540 ml   Output 1350 ml   Net -810 ml     General: Acutely and chronically ill-appearing, debilitated, cachectic  Skin: no rash, warm and dry  Eyes: anicteric sclera  ENT: moist mucous membrane  Neck: supple  Chest: Diffuse wheezes and crackles. Dyspnea at rest  CVS: s1s2, no murmur, no gallop, no rub.   Normal heart rate regular rhythm  Abdomen: soft, nontender, nl sounds, nondistended  Extremities: +1 edema LE b/l, improved  : Stanley catheter  Neuro: Oriented, no acute changes  Psych: normal affect  Medications:    Current Facility-Administered Medications:   •  acetaminophen (TYLENOL) tablet 650 mg, 650 mg, Oral, Q6H PRN, Rosamaria Lace, DO, 650 mg at 07/19/23 2028  •  aspirin chewable tablet 81 mg, 81 mg, Oral, Daily, Rosamaria Lace, DO, 81 mg at 07/20/23 0915  •  azithromycin (ZITHROMAX) tablet 500 mg, 500 mg, Oral, Q24H, Olga Stone MD, 500 mg at 07/19/23 1712  •  budesonide (PULMICORT) inhalation solution 0.5 mg, 0.5 mg, Nebulization, Q12H, Rosamaria Lace, DO, 0.5 mg at 07/20/23 0715  •  ergocalciferol (VITAMIN D2) capsule 50,000 Units, 50,000 Units, Oral, Q28 Days, Brody Anna DO, 50,000 Units at 07/19/23 0950  •  furosemide (LASIX) injection 40 mg, 40 mg, Intravenous, BID (diuretic), DELIA Avila, 40 mg at 07/20/23 0915  •  guaiFENesin (MUCINEX) 12 hr tablet 1,200 mg, 1,200 mg, Oral, Q12H [address], Brody Anna DO, 1,200 mg at 07/20/23 1821  •  heparin (porcine) subcutaneous injection 5,000 Units, 5,000 Units, Subcutaneous, Q8H [address], Brody Anna, DO, 5,000 Units at 07/20/23 0606  •  insulin lispro (HumaLOG) 100 units/mL subcutaneous injection 1-5 Units, 1-5 Units, Subcutaneous, TID AC, 2 Units at 07/19/23 1816 **AND** Fingerstick Glucose (POCT), , , TID AC, Zak Spencer, DO  •  insulin lispro (HumaLOG) 100 units/mL subcutaneous injection 1-5 Units, 1-5 Units, Subcutaneous, HS, Zak Spencer, DO, 1 Units at 07/19/23 2258  •  ipratropium (ATROVENT) 0.02 % inhalation solution 0.5 mg, 0.5 mg, Nebulization, TID, Brody Anna DO, 0.5 mg at 07/20/23 0715  •  levalbuterol (Roselynn Angelo) inhalation solution 1.25 mg, 1.25 mg, Nebulization, TID, Brody Anna DO, 1.25 mg at 07/20/23 0715  •  methylPREDNISolone sodium succinate (Solu-MEDROL) injection 40 mg, 40 mg, Intravenous, Q8H [address], Brody Anna DO, 40 mg at 07/20/23 0606  •  potassium chloride (K-DUR,KLOR-CON) CR tablet 20 mEq, 20 mEq, Oral, BID, DELIA Bowens, 20 mEq at 07/20/23 5121    Laboratory Results:  Results from last 7 days   Lab Units 07/20/23  0516 07/19/23  1225 07/19/23  0618 07/19/23  0245   WBC Thousand/uL  --   --  10.45* 11.08*   HEMOGLOBIN g/dL  --   --  12.6 12.0   HEMATOCRIT %  --   --  37.4 36.1*   PLATELETS Thousands/uL  --   --  159 204   POTASSIUM mmol/L 3.5 3.3*  --  3.3*   CHLORIDE mmol/L 73* 70*  --  71*   CO2 mmol/L >45* >45*  --  >45*   BUN mg/dL 19 14  --  16   CREATININE mg/dL 0.78 0.76  --  0.77   CALCIUM mg/dL 9.0 9.0  --  8.8   MAGNESIUM mg/dL 2.3  --   --   --

## 2023-07-20 NOTE — SPEECH THERAPY NOTE
Speech Language/Pathology    Speech/Language Pathology Progress Note    Patient Name: John Baeza. Today's Date: 7/20/2023     Problem List  Principal Problem:    Acute hypercapnic respiratory failure (HCC)  Active Problems:    Impaired fasting glucose    Goals of care, counseling/discussion    Hyponatremia    Acute on chronic systolic congestive heart failure (HCC)    End stage COPD ([address])       Past Medical History  Past Medical History:   Diagnosis Date   • Acute metabolic encephalopathy 7/33/5620   • Arthritis    • Bladder mass    • Cardiomyopathy [facility])    • Chest pain    • COPD (chronic obstructive pulmonary disease) (HCC)    • COVID-19 virus infection 2/23/2023   • CPAP (continuous positive airway pressure) dependence    • Emphysema of lung (HCC)    • Hypoxia     nocturnal   • Left bundle branch block    • Multiple pulmonary nodules     last assessed: 10/12/16   • Pneumonia    • Sleep apnea    • Sleep apnea, obstructive    • Smoker    • Weight loss 8/29/2019        Past Surgical History  Past Surgical History:   Procedure Laterality Date   • COLONOSCOPY     • CYSTOSCOPY     • CYSTOSCOPY  02/17/2021   • VT BLADDER INSTILLATION ANTICARCINOGENIC AGENT N/A 1/3/2020    Procedure: INSTILLATION MITOMYCIN;  Surgeon: Sulaiman Pepe MD;  Location: [facility];  Service: Urology   • VT CYSTO W/REMOVAL OF LESIONS SMALL N/A 1/3/2020    Procedure: TRANSURETHRAL RESECTION OF BLADDER TUMOR (TURBT); Surgeon: Sulamian Pepe MD;  Location: [facility];  Service: Urology   • VT CYSTOURETHROSCOPY WITH BIOPSY N/A 4/13/2021    Procedure: Taylor Whitewood;  Surgeon: Sulaiman Pepe MD;  Location: [facility];  Service: Urology   • VT TRURL ELECTROSURG RESCJ PROSTATE BLEED COMPLETE N/A 4/13/2021    Procedure: TRANSURETHRAL RESECTION OF PROSTATE (TURP) BLADDER BIOPSY, FULGURATION;  Surgeon: Sulaiman Pepe MD;  Location: [facility];  Service: Urology         Subjective:  Pt seen for dysphagia tx.  Pt was sitting upright in chair. Pt was alert, cooperative. Objective:  Family present for session. Pt and family report decent appetite since yesterday, and no s/s of aspiration. Pt denies dysphagia except for difficulty masticating due to edentulous state. During tx pt ate a snack of animal crackers and drank water via straw. Mastication was prolonged but functional. There were no overt s/s of aspiration with the crackers or water. Pt is on NC, sats in upper 90s. Wife reports pt must take frequent breaks during meals, and must eat slowly, due to fatigue and respiratory compromise. Reviewed recommendations to eat smaller, more frequent meals, and encouraged he continue taking breaks during meals as needed. Assessment:  Pt appears to be tolerating regular diet and thin liquids. He would benefit from follow up during a meal if possible, to ensure diet toleration with a larger sample. Plan/Recommendations:  Continue regular diet and thin liquids. Aspiration precautions. Smaller more frequent meals, take breaks as needed to catch breath or rest. Consider eating calorie dense foods. Recommend dietician consult. ST to follow up during a meal if possible.

## 2023-07-20 NOTE — PROGRESS NOTES
Advanced Heart Failure/Pulmonary Hypertension - Attending Note - Florencia Samuels. 79 y.o. male MRN: [id number]      Please see complete HF note documented by the resident/fellow/AP; this is attending attestation. Assessment:  79 y.o. male Hx per chart p/w worse SOB, fatigue, confusion x weeks.     Follows Dr. Rj Stubbs general cardiology  Has seen Dr. Jaime Bolivar for HF  Hyponatremia, Na 123 on arrival  COPD, end stage, (FEV1 22%, %, DLCO 45%),  former smoker and possible chemical exposures at work  NICM, abnl EF x years, remote 35%>45%>25%  stage C, NYHA III  Has LBBB,   Soft BP as outpt  Palliative care discussions ongoing  HLD  KEVIN on bipap  Weight loss  Cachexia, BMI 20, frail  Bladder cancer  COVID-19,Tested positive on 2/22/23      Reviewed all pertinent labs/imaging/data including:    Temp:  [97.4 °F (36.3 °C)-98.4 °F (36.9 °C)] 97.9 °F (36.6 °C)  HR:  [72-97] 97  Resp:  [16-20] 16  BP: ()/(52-72) 113/72     Weight (last 2 days)     Date/Time Weight    07/20/23 0600 47.2 (104)           Intake/Output Summary (Last 24 hours) at 7/20/2023 1321  Last data filed at 7/20/2023 1153  Gross per 24 hour   Intake 780 ml   Output 1350 ml   Net -570 ml       Drips:        Plan:  Warm, near euvolemic by exam  Feels betetr at his BL, anxious to return home  Frequent readmits recently    Ongoing palliative care discussions    End stage pt without reasonable options for escalation unfortunately    Switch to po diuretic tomorrow 7/21 - use torsemide 30 qd    gdmt below  Very limited    Neurohormonal Blockade:  --Beta Blocker: no  --ARNi / ACEi / ARB: losartan 12.5 mg QD - now off as outpt   --Aldosterone Antagonist: no   --SGLT2 Inhibitor: no    --prior Home Diuretic: torsemide 20 mg QD     Sudden Cardiac Death Risk Reduction:  --ICD: LVEF reduced to 20-25%.     Electrical Resynchronization:  --Candidacy for BiV device: QRSd 120ms, chronic LBBB with dyssynchrony on echocardiogram.  Advanced Therapies (if appropriate): Not a candidate unfortunately    Studies:  I have reviewed all pertinent patient data/labs/imaging where available, including but not limited to the below studies. Selected results may be displayed here but comprehensive listing is omitted for note clarity and can be found in the epic chart. ECG. Echo. Stress. Cath. Thank you for the opportunity to participate in the care of this patient.     James Almonte MD  Attending Physician  Advanced Heart Failure and Transplant Cardiology  [address] [facility]

## 2023-07-20 NOTE — QUICK NOTE
7/20/2023 10:54 AM -  Raj Prasad Jr.'s chart and case were reviewed by Dakota Martinez MD.  Mode of review included electronic chart check. Well known to our team for ongoing goals of care conversations. This admission has set limit of level 3 code status and appears to be improving per chart review. Patient and spouse had both previously expressed a desire to limit how often code status/goals are addressed. Accordingly we will follow peripherally and reengage as needed for support or for goals discussions should patient have a deterioration in clinical status. Patient is appropriate for outpatient follow-up. We will make arrangements through our office. For urgent issues or any questions/concerns, please notify on-call provider via [address]. You may also call our answering service 24/7 at [phone]. Dakota Martinez MD  Palliative and Supportive Care  Clinic/Answering Service: [phone]  You can find me on [employer]!

## 2023-07-20 NOTE — UTILIZATION REVIEW
Initial Clinical Review    Admission: Date/Time/Statement:   Admission Orders (From admission, onward)     Ordered        07/19/23 0419  INPATIENT ADMISSION  Once                      Orders Placed This Encounter   Procedures   • INPATIENT ADMISSION     Standing Status:   Standing     Number of Occurrences:   1     Order Specific Question:   Level of Care     Answer:   Level 2 Stepdown / HOT [14]     Order Specific Question:   Estimated length of stay     Answer:   More than 2 Midnights     Order Specific Question:   Certification     Answer:   I certify that inpatient services are medically necessary for this patient for a duration of greater than two midnights. See H&P and MD Progress Notes for additional information about the patient's course of treatment. ED Arrival Information     Expected   -    Arrival   7/19/2023 02:25    Acuity   Urgent            Means of arrival   Ambulance    Escorted by   [facility]    Service   Hospitalist    Admission type   Emergency            Arrival complaint   Fall           Chief Complaint   Patient presents with   • Syble Carbon out of bed approx 1 foot. Takes aspirin. No complaints just here for eval. Hx COPD       Initial Presentation: 79 y.o. male with PMH of end-stage COPD, CHF w/ LVEF 20%, CAD presented to the ED from home via EMS w/ resp distress, somnolence and decreased appetite over the last several weeks. Pt has been significantly SOB and confused over the last several weeks. Not eating much and lost significant amt of wt. In the ED, he is tachypneic, CO2 on metabolic panel >79. . WBC 11.08, K 3.3, Na 123. Initially placed on 4L NC then transitioned to BIPAP. Chest x-ray with evidence of enlarged lung fields consistent with underlying COPD, no obvious infiltrate or effusion. Changed from full code to DNR/DNI per family.   On exam, Somnolent but arousable and when arousable oriented, Tachypnea, severe expiratory wheezing,  Severe cachexia of the extremities. Given IV Solu Medrol and nebs. Admitted as Inpatient for acute hypercapnic res failure, acute on chronic systolic CHF, hyponatremia. Plan: Mon on telemetry. Cont BIPAP ovn. IV steroids. sched nebs. IV Lasix 40 mg bid. Check procal. Doxycycline. NPO. ST consult. HF consult. Palliative care consult. Mon Na.     07/19  HF Consult: Acute on chronic HFrEF; LVEF 20%; NYHA III; ACC/AHA Stage C: On exam, warm, elevated JVP, pitting LE edema. IV diuresis, assess response. Likely not a candidate for any device type therapies given high one year mortality risk and no advanced therapy options given end stage COPD. Will continue to optimize medically however will need ongoing [address] [place] discussions with assistance of palliative care team.    Pulmonology Consult: COPD exacerbation with acute on chronic hypoxemic respiratory failure: azithromycin, Continue albuterol, Atrovent, Pulmicort, Mucinex, Xopenex, Solu-Medrol 40 mg every 8. BiPAP qHS. Diuresis per primary team. Wean O2 as able for SPO2 >88%. Palliative care consulted. Nephrology Consult: Acute on chronic hyponatremia: Etiology: Volume mediated, +/- SIADH. Na 123 on admission. Plan: Check urine sodium and urine osmolality. Urine sodium results will likely be skewed due to IV Lasix. Modest fluid restriction. Continue IV Lasix. Try to boost solute intake with use of nutrition drink as tolerated. Check BMP now. Check labs every 8 hours. Goal sodium level 127-129 in 24 hours. Palliative Care Consult: Goals of care: Limits set at DNAR/DNI. Date: 07/20  Day 2:   Pulmonology Notes: Pt feeling improved. Feels more energetic, improved appetite, breathing is better. Cont solumedrol 40mg q8h, xopenex, ipratropium, two more doses of azithromycin. BiPAP when sleeping, wean O2 as able for SPO2 >88%.   PE: aaox3, diffuse wheezing bilaterally, rhonchi bl, breathing comfortably on 6.5L. mild BLE edema improved from yesterday    IM Note: Pt was able to transition from BIPAP to 6L NC. Reports still w/ SOB. Reports feeling cold and fatigued today. continue to monitor with VBG as needed. PT/OT. BIPAP at HS. Mon I/O. Daily wts. Cont IV Lasix. CMP in am. Mon Mag and K in am. Cont to mon POCT Glucose. Nephrology Notes: Sodium level improving, up to 126. Serum osmolality 273, urine osmolality 261, urine sodium 38. Cont Loop diuretic, fluid restriction, increase solute intake with the addition of nutrition drink. Treat hypokalemia.        ED Triage Vitals   Temperature Pulse Respirations Blood Pressure SpO2   07/19/23 0432 07/19/23 0230 07/19/23 0230 07/19/23 0230 07/19/23 0230   (!) 97.2 °F (36.2 °C) (!) 54 (!) 26 135/96 94 %      Temp Source Heart Rate Source Patient Position - Orthostatic VS BP Location FiO2 (%)   07/19/23 0432 07/19/23 0230 07/19/23 1519 07/19/23 1519 --   Oral Monitor Lying Right arm       Pain Score       07/19/23 0230       No Pain          Wt Readings from Last 1 Encounters:   07/20/23 47.2 kg (104 lb)     Additional Vital Signs:   Date/Time Temp Pulse Resp BP MAP (mmHg) SpO2 Calculated FIO2 (%) - Nasal Cannula Nasal Cannula O2 Flow Rate (L/min) O2 Device O2 Interface Device Patient Position - Orthostatic VS   07/20/23 0753 -- -- -- -- -- 93 % 32 3 L/min Nasal cannula -- --   07/20/23 07:36:41 97.4 °F (36.3 °C) Abnormal  80 20 102/63 -- 94 % -- -- -- -- Lying   07/20/23 0717 -- -- -- -- -- 95 % -- -- -- Face mask --   07/20/23 0715 -- -- -- -- -- 95 % -- -- -- -- --   07/20/23 0600 -- 79 -- 106/52 -- -- -- -- -- -- Lying   07/20/23 0421 -- -- -- -- -- -- -- -- -- Face mask --   07/20/23 0345 -- -- -- -- -- 96 % -- -- BiPAP -- --   07/20/23 02:53:27 98 °F (36.7 °C) 81 20 104/62 -- 95 % -- -- -- -- --   07/19/23 2334 -- -- -- 87/53 Abnormal   65 98 % -- -- -- Face mask --   BP: Iona Mckee made aware of SBP under 90 at 07/19/23 2334 07/19/23 23:28:18 97.7 °F (36.5 °C) 72 19 86/61 Abnormal  -- 98 % -- -- -- -- --   07/19/23 2140 -- -- -- -- -- 98 % 32 3 L/min -- -- --   07/19/23 2000 -- -- -- -- -- -- 36 4 L/min Nasal cannula -- --   07/19/23 18:54:22 98.3 °F (36.8 °C) -- 17 107/62 -- 98 % -- -- -- -- Lying   07/19/23 1600 -- -- -- -- -- 95 % 36 4 L/min Nasal cannula -- --   07/19/23 15:19:23 98.4 °F (36.9 °C) -- 17 100/56 -- 94 % -- -- -- -- Lying   07/19/23 1343 -- -- -- -- -- 100 % -- -- -- -- --   07/19/23 1200 -- -- -- -- -- 100 % 36 4 L/min Nasal cannula -- --   07/19/23 1141 -- -- -- -- -- 100 % 36 4 L/min Nasal cannula -- --   07/19/23 1100 98.9 °F (37.2 °C) 75 20 106/62 -- 100 % -- -- BiPAP -- --   07/19/23 0800 -- -- -- -- -- 100 % -- -- BiPAP -- --   07/19/23 0750 -- 70 34 Abnormal  -- -- 99 % -- -- -- Face mask --   07/19/23 0700 98.2 °F (36.8 °C) 83 20 94/55 -- 100 % -- -- BiPAP -- --   07/19/23 05:53:28 97.3 °F (36.3 °C) Abnormal  88 29 Abnormal  125/56 -- 100 % -- -- BiPAP -- --   07/19/23 0500 -- 80 18 -- -- 96 % -- -- BiPAP -- --   07/19/23 0456 -- -- -- -- -- 97 % -- -- BiPAP -- --   07/19/23 0440 -- -- -- -- -- 96 % -- -- -- Face mask --   07/19/23 0432 97.2 °F (36.2 °C) Abnormal  -- -- -- -- -- -- -- -- -- --   07/19/23 0330 -- 90 20 -- -- 100 % 36 4 L/min Nasal cannula -- --       Pertinent Labs/Diagnostic Test Results:   XR chest 1 view portable   ED Interpretation by Andrew Gordon DO (07/19 6221)   Consistent with copd; no definitive consolidations noted      Final Result by Reina Chandler MD (07/19 5001)      Emphysematous changes. No focal airspace consolidation. Mild pulmonary vascular congestion.             Workstation performed: [id number]           07/19 EKG result: Sinus rhythm with occasional Premature ventricular complexes and Possible Premature atrial complexes with Aberrant conduction  Rightward axis  Non-specific intra-ventricular conduction block    Results from last 7 days   Lab Units 07/19/23  0622   SARS-COV-2  Negative     Results from last 7 days   Lab Units 07/19/23  0618 07/19/23  0245   WBC Thousand/uL 10.45* 11.08*   HEMOGLOBIN g/dL 12.6 12.0   HEMATOCRIT % 37.4 36.1*   PLATELETS Thousands/uL 159 204   NEUTROS ABS Thousands/µL 9.87* 8.75*         Results from last 7 days   Lab Units 07/20/23  0516 07/19/23  1225 07/19/23  0245   SODIUM mmol/L 126* 123* 123*   POTASSIUM mmol/L 3.5 3.3* 3.3*   CHLORIDE mmol/L 73* 70* 71*   CO2 mmol/L >45* >45* >45*   BUN mg/dL 19 14 16   CREATININE mg/dL 0.78 0.76 0.77   EGFR ml/min/1.73sq m 94 95 94   CALCIUM mg/dL 9.0 9.0 8.8   MAGNESIUM mg/dL 2.3  --   --      Results from last 7 days   Lab Units 07/20/23  0516 07/19/23  0245   AST U/L 31 43   ALT U/L 20 23   ALK PHOS U/L 57 64   TOTAL PROTEIN g/dL 6.0* 6.3*   ALBUMIN g/dL 3.2* 3.5   TOTAL BILIRUBIN mg/dL 0.75 0.82     Results from last 7 days   Lab Units 07/20/23  0609 07/19/23  2256 07/19/23  1753 07/19/23  1318 07/19/23  0657   POC GLUCOSE mg/dl 132 213* 273* 246* 190*     Results from last 7 days   Lab Units 07/20/23  0516 07/19/23  1225 07/19/23  0245   GLUCOSE RANDOM mg/dL 116 243* 144*     Results from last 7 days   Lab Units 07/19/23  1225   OSMOLALITY, SERUM mmol/*     Results from last 7 days   Lab Units 07/20/23  0516   HEMOGLOBIN A1C % 5.7*   EAG mg/dl 117       Results from last 7 days   Lab Units 07/19/23  0531   PH JHONATHAN  7.339   PCO2 JHONATHAN mm Hg 88.3*   PO2 JHONATHAN mm Hg 35.0   HCO3 JHONATHAN mmol/L 46.5*   BASE EXC JHONATHAN mmol/L 16.5   O2 CONTENT JHONATHAN ml/dL 11.1   O2 HGB, VENOUS % 62.2             Results from last 7 days   Lab Units 07/19/23  0828 07/19/23  0449 07/19/23  0245   HS TNI 0HR ng/L  --   --  38   HS TNI 2HR ng/L  --  37  --    HSTNI D2 ng/L  --  -1  --    HS TNI 4HR ng/L 40  --   --    HSTNI D4 ng/L 2  --   --          Results from last 7 days   Lab Units 07/19/23  0618   PROTIME seconds 11.1*   INR  0.78*   PTT seconds 19*     Results from last 7 days   Lab Units 07/19/23  0828   TSH 3RD GENERATON uIU/mL 1.385     Results from last 7 days   Lab Units 07/20/23  0516 07/19/23  0828   PROCALCITONIN ng/ml 0.06 0.05                 Results from last 7 days   Lab Units 07/19/23  1225   BNP pg/mL 533*           Results from last 7 days   Lab Units 07/19/23  1225   OSMOLALITY, SERUM mmol/*   OSMO UR mmol/     Results from last 7 days   Lab Units 07/19/23  1225   SODIUM UR  38     Results from last 7 days   Lab Units 07/19/23  0622   INFLUENZA A PCR  Negative   INFLUENZA B PCR  Negative   RSV PCR  Negative            ED Treatment:   Medication Administration from 07/19/2023 0225 to 07/19/2023 0543       Date/Time Order Dose Route Action     07/19/2023 0248 EDT methylPREDNISolone sodium succinate (Solu-MEDROL) injection 80 mg 80 mg Intravenous Given     07/19/2023 0304 EDT albuterol inhalation solution 10 mg 10 mg Nebulization Given     07/19/2023 0305 EDT ipratropium (ATROVENT) 0.02 % inhalation solution 1 mg 1 mg Nebulization Given     07/19/2023 0305 EDT sodium chloride 0.9 % inhalation solution 3 mL 3 mL Nebulization Given        Past Medical History:   Diagnosis Date   • Acute metabolic encephalopathy 6/31/2849   • Arthritis    • Bladder mass    • Cardiomyopathy [facility])    • Chest pain    • COPD (chronic obstructive pulmonary disease) ([facility])    • COVID-19 virus infection 2/23/2023   • CPAP (continuous positive airway pressure) dependence    • Emphysema of lung ([facility])    • Hypoxia     nocturnal   • Left bundle branch block    • Multiple pulmonary nodules     last assessed: 10/12/16   • Pneumonia    • Sleep apnea    • Sleep apnea, obstructive    • Smoker    • Weight loss 8/29/2019     Present on Admission:  • Acute on chronic systolic congestive heart failure (HCC)  • Impaired fasting glucose  • Hyponatremia      Admitting Diagnosis: Acute on chronic combined systolic and diastolic heart failure (HCC) [I50.43]  Severe protein-calorie malnutrition (HCC) [E43]  SOB (shortness of breath) [R06.02]  Acute exacerbation of chronic obstructive pulmonary disease (COPD) ([address]) [J44.1]  Hypoxia [R09.02]  COPD with acute exacerbation ([facility]) [J44.1]  End stage COPD ([facility]) [J44.9]  Palliative care patient [Z51.5]  Unspecified multiple injuries, initial encounter [T07. XXXA]  Age/Sex: 79 y.o. male  Admission Orders:  SCD  PT/OT/ST  Daily wt  I/O  Telemetry Monitoring    Scheduled Medications:  aspirin, 81 mg, Oral, Daily  azithromycin, 500 mg, Oral, Q24H  budesonide, 0.5 mg, Nebulization, Q12H  ergocalciferol, 50,000 Units, Oral, Q28 Days  furosemide, 40 mg, Intravenous, BID (diuretic)  guaiFENesin, 1,200 mg, Oral, Q12H GABE  heparin (porcine), 5,000 Units, Subcutaneous, Q8H [facility]  insulin lispro, 1-5 Units, Subcutaneous, TID AC  insulin lispro, 1-5 Units, Subcutaneous, HS  ipratropium, 0.5 mg, Nebulization, TID  levalbuterol, 1.25 mg, Nebulization, TID  methylPREDNISolone sodium succinate, 40 mg, Intravenous, Q8H GABE  potassium chloride, 20 mEq, Oral, BID      Continuous IV Infusions: none     PRN Meds:  acetaminophen, 650 mg, Oral, Q6H PRN 07/19 x 1        IP CONSULT TO NUTRITION SERVICES  IP CONSULT TO PALLIATIVE CARE  IP CONSULT TO HEART FAILURE SERVICE  IP CONSULT TO PULMONOLOGY  IP CONSULT TO NEPHROLOGY    Network Utilization Review Department  ATTENTION: Please call with any questions or concerns to [phone] and carefully listen to the prompts so that you are directed to the right person. All voicemails are confidential.  Angie Meehan all requests for admission clinical reviews, approved or denied determinations and any other requests to dedicated fax number below belonging to the campus where the patient is receiving treatment.  List of dedicated fax numbers for the Facilities:  [place] DENIALS (Administrative/Medical Necessity) [phone]   [facility] (Maternity/NICU/Pediatrics) [address] [place] [address] [phone]   [facility] [phone]   [address] [phone]   [facility] [address] [address] [facility] [address] [address] [facility] [phone]

## 2023-07-20 NOTE — APP STUDENT NOTE
* Acute hypercapnic respiratory failure (HCC)  Assessment & Plan  • Patient presented to the ED with shortness of breath, wheezing, somnolence accompanied with his family   • History of end-stage COPD, acute on chronic combined systolic and diastolic heart failure  • Severe cachexia, diffuse x-ray wheezing on exam, somnolent but arousable at times and then falls back asleep, bilateral pitting lower extremity edema  • Chest x-ray with evidence of enlarged lung fields consistent with underlying COPD, no obvious infiltrate or effusion per my read official read  • Procalcitonin <0.06 therefore this is not an infectious cause, most likely secondary to COPD exacerbation due to increase in emphysematous changes  • CO2 >45, continue to monitor with VBG as needed and pulmnology is following  • Continue PT/OT  • BiPAP as needed at night and for levels that are less than 89% on room air with nasal cannula on highest flow     End stage COPD (HCC)  Assessment & Plan  • Solu-Medrol 40 mg IV every 8 hours, azithromycin, atrovent/ pulmicort/ xopenex nebs, mucinex  • Pulmonology following the patient      Acute on chronic systolic congestive heart failure (HCC)  Assessment & Plan      Wt Readings from Last 3 Encounters:   06/26/23 43.8 kg (96 lb 8 oz)   06/16/23 43.1 kg (95 lb)   06/12/23 47.8 kg (105 lb 4.8 oz)      • Bilateral lower extremity edema in setting of respiratory failure; continue to monitor on examination  • Echo in May 2023 with LVEF 20%   • On PTA torsemide  • Start Lasix IV 40 mg twice daily in setting of respiratory failure. Noted hyponatremia but given patient's severe shortness of breath, will monitor. • Monitor intake and output  • Daily weight readings     Hyponatremia  Assessment & Plan  • Sodium: 126  • Previously had hyponatremia attributed to SIADH as well as heart failure exacerbation  • Patient strict n.p.o. as well as being IV diuresed.   • Monitor sodium with CMP in the AM  • Lasix 40 mg IV 2x daily - tolvaptan potentially today depending on nephrology orders    Hypokalemia  Assessment & Plan  · Supplement with IV potassium and magnesium  · Continue to monitor Magnesium and potassium in the AM  · Nephrology monitoring and maintaining electrolytes    Goals of care, counseling/discussion  Assessment & Plan  • See discussion above in respiratory failure section  • Code status deescalated to DNR/DNI  • Palliative consulted for further assistance with goals of care     Pre- Diabetes  Assessment & Plan  • Sliding scale insulin while hospitalized  • Hypoglycemia protocol  • Low carbohydrate, low sugar diet (diabetic diet)  • HbA1C: 5.7 (7/20); continue to monitor POCT glucose    Hyperlipidemia  Assessment & Plan  · Continue Statin    Nonobstructive CAD  Assessment & Plan  ·  Continue ASA as well as statin      Subjective:   Hans Doherty is a 79 y.o. male who has past medical history significant for end-stage COPD, stage IV heart failure, combined systolic and diastolic heart failure with LVEF 20%, coronary artery disease who presented to [address] [place] [address] ER on the early morning hours of 7/19 with symptoms of respiratory distress as well as increased somnolence and decreased appetite over the last several weeks. History obtained from speaking with patient, patient's wife and son. Patient's family numbers were at bedside. They report patient has been increasingly short of breath and confused over the last several weeks. They report that he is also not eating much anymore and no longer very hungry. They also report that he has lost significant amount of weight. Today he states that his appetite is well and that he ate all of his food this morning. He was able to transition from BiPAP to nasal cannula and now he is 6L on a nasal cannula. Pulmonology was present upon my entry. He denies fever, chills but admits to continuing shortness of breathe.  He also states that he is always cold and he feels somewhat fatigued today. He is interested in when he will be discharged and admits that " he is not in any pain and does not need palliative care in his life". Objective:     Vitals:   Temp (24hrs), Av.1 °F (36.7 °C), Min:97.4 °F (36.3 °C), Max:98.9 °F (37.2 °C)    Temp:  [97.4 °F (36.3 °C)-98.9 °F (37.2 °C)] 97.4 °F (36.3 °C)  HR:  [72-81] 80  Resp:  [17-20] 20  BP: ()/(52-63) 102/63  SpO2:  [93 %-100 %] 93 %  Body mass index is 20.31 kg/m². Input and Output Summary (last 24 hours): Intake/Output Summary (Last 24 hours) at 2023 0902  Last data filed at 2023 0815  Gross per 24 hour   Intake 540 ml   Output 1350 ml   Net -810 ml       Physical Exam:   Physical Exam  Vitals and nursing note reviewed. Constitutional:       General: He is not in acute distress. Appearance: He is well-developed. He is ill-appearing. Comments: Patient is cachetic    HENT:      Head: Normocephalic and atraumatic. Nose:      Comments: Patient on 6L on nasal cannula   Eyes:      Extraocular Movements: Extraocular movements intact. Conjunctiva/sclera: Conjunctivae normal.   Neck:      Comments: No JVD present  Cardiovascular:      Rate and Rhythm: Normal rate and regular rhythm. Pulses: Normal pulses. Heart sounds: No murmur heard. No friction rub. No gallop. Pulmonary:      Effort: Respiratory distress present. Breath sounds: Wheezing and rales present. No rhonchi. Chest:      Chest wall: No tenderness. Abdominal:      General: Abdomen is flat. Bowel sounds are normal.      Palpations: Abdomen is soft. Tenderness: There is no abdominal tenderness. There is no right CVA tenderness or left CVA tenderness. Musculoskeletal:         General: No swelling. Cervical back: Neck supple. Right lower leg: Edema present. Left lower leg: Edema present.       Comments: Trace edema in the right lower extremity as well as the left lower extremity around the ankles Skin:     General: Skin is warm and dry. Capillary Refill: Capillary refill takes less than 2 seconds. Neurological:      Mental Status: He is alert and oriented to person, place, and time.    Psychiatric:         Mood and Affect: Mood normal.          Additional Data:     Labs:  Results from last 7 days   Lab Units 23  0618   WBC Thousand/uL 10.45*   HEMOGLOBIN g/dL 12.6   HEMATOCRIT % 37.4   PLATELETS Thousands/uL 159   NEUTROS PCT % 94*   LYMPHS PCT % 3*   MONOS PCT % 3*   EOS PCT % 0     Results from last 7 days   Lab Units 23  0516   SODIUM mmol/L 126*   POTASSIUM mmol/L 3.5   CHLORIDE mmol/L 73*   CO2 mmol/L >45*   BUN mg/dL 19   CREATININE mg/dL 0.78   CALCIUM mg/dL 9.0   ALBUMIN g/dL 3.2*   TOTAL BILIRUBIN mg/dL 0.75   ALK PHOS U/L 57   ALT U/L 20   AST U/L 31   GLUCOSE RANDOM mg/dL 116     Results from last 7 days   Lab Units 23  0618   INR  0.78*     Results from last 7 days   Lab Units 23  0609 23  2256 23  1753 23  1318 23  0657   POC GLUCOSE mg/dl 132 213* 273* 246* 190*     Results from last 7 days   Lab Units 23  0516   HEMOGLOBIN A1C % 5.7*     Results from last 7 days   Lab Units 23  0516 23  0828   PROCALCITONIN ng/ml 0.06 0.05       Lines/Drains:  Invasive Devices     Peripheral Intravenous Line  Duration           Peripheral IV 23 Right;Ventral (anterior) Forearm 1 day          Drain  Duration           External Urinary Catheter Small <1 day                  Telemetry:  Telemetry Orders (From admission, onward)             24 Hour Telemetry Monitoring  Continuous x 24 Hours (Telem)           Question:  Reason for 24 Hour Telemetry  Answer:  Acute respiratory failure on BiPAP                 Recent Cultures (last 7 days):         Last 24 Hours Medication List:   Current Facility-Administered Medications   Medication Dose Route Frequency Provider Last Rate   • acetaminophen  650 mg Oral Q6H PRN Ronda Herman DO • aspirin  81 mg Oral Daily [facility], DO     • azithromycin  500 mg Oral Q24H Kalyani Ruiz MD     • budesonide  0.5 mg Nebulization Q12H [facility], DO     • ergocalciferol  50,000 Units Oral Q28 Days [facility], DO     • furosemide  40 mg Intravenous BID (diuretic) DELIA Bhatt     • guaiFENesin  1,200 mg Oral Q12H [address] [facility], DO     • heparin (porcine)  5,000 Units Subcutaneous Q8H [address] [facility], DO     • insulin lispro  1-5 Units Subcutaneous TID [facility], [facility]     • insulin lispro  1-5 Units Subcutaneous [facility], DO     • ipratropium  0.5 mg Nebulization TID [facility], DO     • levalbuterol  1.25 mg Nebulization TID [facility], DO     • methylPREDNISolone sodium succinate  40 mg Intravenous Q8H [address] [facility], DO     • potassium chloride  20 mEq Oral BID DELIA Bowens     • potassium chloride  20 mEq Oral Once DELIA Gunderson          Today, Patient Was Seen By: Juan Jose Krueger    **Please Note: This note may have been constructed using a voice recognition system. **

## 2023-07-20 NOTE — CASE MANAGEMENT
Case Management Assessment    Patient name Ivan Cardenas.   Location [facility] 510/[facility] 510-01 MRN [id number]  : 1955 Date 2023       Current Admission Date: 2023  Current Admission Diagnosis:Acute hypercapnic respiratory failure [facility])   Patient Active Problem List    Diagnosis Date Noted   • Acute hypercapnic respiratory failure ([address]) 2023   • End stage COPD ([address]) 2023   • Palliative care patient 2023   • Alkalosis 2023   • Severe protein-calorie malnutrition ([address]) 06/10/2023   • Pneumonia 2023   • Acute on chronic systolic congestive heart failure ([address]) 2023   • Moderate protein-calorie malnutrition ([address]) 2023   • HLD (hyperlipidemia) 2023   • COPD exacerbation ([address]) 2023   • Hyperglycemia 2023   • Physical deconditioning 2023   • Chronic anemia 2023   • Hyponatremia 2023   • Chronic HFrEF (heart failure with reduced ejection fraction) ([address]) 2022   • Protein-calorie malnutrition ([address]) 2022   • Pulmonary nodules/lesions, multiple 2022   • Prostate cancer ([address]) 2021   • BPH with obstruction/lower urinary tract symptoms 2021   • Goals of care, counseling/discussion 2021   • Chronic respiratory failure with hypoxia and hypercapnia ([address]) 2020   • Macrocytosis without anemia 2019   • Other insomnia 2019   • Gastroesophageal reflux disease 2017   • Nonobstructive atherosclerosis of coronary artery 2016   • Mood disorder ([address]) 2015   • Impaired fasting glucose 2015   • Osteoporosis 10/14/2014   • Vitamin D deficiency 10/14/2014   • Nonischemic cardiomyopathy ([address]) 2014   • Left bundle-branch block 2013   • Osteoarthritis 2013   • KEVIN and COPD overlap syndrome ([address]) 2013   • Chronic obstructive pulmonary disease with acute exacerbation ([address] [facility]) 2012      LOS (days): 1  Geometric Mean LOS (GMLOS) (days):   Days to GMLOS: OBJECTIVE:    Risk of Unplanned Readmission Score: 33.92         Current admission status: Inpatient     Preferred Pharmacy:   [id number]  [address] [place] [address]  Phone: [phone] Fax: [phone]    Primary Care Provider: Ashely Russell DO    Primary Insurance: BLUE CROSS  Secondary Insurance: MEDICARE    ASSESSMENT:  [address], [address] Representative - Spouse   Primary Phone: [phone] [facility]  Home Phone: [phone]               Advance Directives  Does patient have a [address]?: No    Readmission Root Cause  30 Day Readmission: No    Patient Information  Admitted from[de-identified] Home  Mental Status: Alert  During Assessment patient was accompanied by: Son  Assessment information provided by[de-identified] Patient  Primary Caregiver: Spouse  Caregiver's Name[de-identified] Cali Murillo (spouse)  Caregiver's Relationship to Patient[de-identified] Family Member  Caregiver's Telephone Number[de-identified] [phone]  Support Systems: Spouse/significant other, Son  [place] of Residence: [address] [place] [address] do you live in?: [email or web address] entry access options.  Select all that apply.: Stairs  Number of steps to enter home.: 3  Do the steps have railings?: Yes  Type of Current Residence: [place]  In the last 12 months, was there a time when you were not able to pay the mortgage or rent on time?: No  In the last 12 months, how many places have you lived?: 1  In the last 12 months, was there a time when you did not have a steady place to sleep or slept in a shelter (including now)?: No  Homeless/housing insecurity resource given?: N/A  Living Arrangements: Lives w/ Spouse/significant other  Is patient a ?: No    Activities of Daily Living Prior to Admission  Functional Status: Assistance  Completes ADLs independently?: No  Level of ADL dependence: Assistance  Ambulates independently?: No  Level of ambulatory dependence: Assistance  Does patient use assisted devices?: Yes  Assisted Devices (DME) used: Yajaira Gonzalez  Does patient currently own DME?: Yes  What DME does the patient currently own?: Home Oxygen concentrator, Portable Oxygen tanks, Shower Chair, Walker, Wheelchair  Does patient have a history of Outpatient Therapy (PT/OT)?: No  Does the patient have a history of Short-Term Rehab?: Yes (Has been to [facility] in the past.)  Does patient have a history of HHC?: Yes ([facility] was arranged but declined services.)  Does patient currently have [facility]?: No    Patient Information Continued  Income Source: Pension/assisted  Does patient have prescription coverage?: Yes  Within the past 12 months, you worried that your food would run out before you got the money to buy more.: Never true  Within the past 12 months, the food you bought just didn't last and you didn't have money to get more.: Never true  Food insecurity resource given?: N/A  Does patient receive dialysis treatments?: No  Does patient have a history of substance abuse?: No  Does patient have a history of Mental Health Diagnosis?: No    Means of Transportation  Means of Transport to Appts[de-identified] Family transport  In the past 12 months, has lack of transportation kept you from medical appointments or from getting medications?: No  In the past 12 months, has lack of transportation kept you from meetings, work, or from getting things needed for daily living?: No  Was application for public transport provided?: N/A      Summary: Met with patient and patient's son at bedside. Lives with spouse. Family assists with ADLs and transport as needed. Has Home O2 at 3 L through [address]. Informed patient of therapy recommendations. Patient declined VNA. Patient requesting CM speak to spouse regarding need for hospital bed.  CM reviewed d/c planning process including the following: identifying help at home, patient preference for d/c planning needs,[facility] Meds to Bed program, availability of treatment team to discuss questions or concerns patient and/or family may have regarding understanding medications and recognizing signs and symptoms once discharged. CM also encouraged patient to follow up with all recommended appointments after discharge. Patient advised of importance for patient and family to participate in managing patient’s medical well being. Update at 1238: Met with patient's spouse at bedside. Spouse confirmed she would like a hospital bed. Spouse requesting it to be ordered through [address] [facility]. Ordered through [address] [facility] [address] [address].

## 2023-07-20 NOTE — PROGRESS NOTES
Progress Note - Pulmonary   Andrei Ugarte. 79 y.o. male MRN: [id number]  Unit/Bed#: [facility] 510-01 Encounter: [id number]      Assessment:  Failure to thrive, poor PO intake, weakness, dyspnea, increasing solmnolence in the setting of end-stage COPD and stage IV heart failure (EF 20%). Overall, we will treat for COPD exacerbation and encourage more discussions with palliative as the prognosis is poor and patient's baseline continues to decline. Patient has moved to DNR/DNI though family still wants him to receive treatment for his conditions. Plan:  COPD exacerbation with acute on chronic hypoxemic respiratory failure, continue current regimen: solumedrol 40mg q8h, xopenex, ipratropium, two more doses of azithromycin (EOT 7/21), BiPAP when sleeping, wean O2 as able for SPO2 >88%  Will decrease to IV solumedrol BID while inpatient and then switch to PO prednisone 60mg daily with taper by 10mg q3d on discharge until back to baseline 5mg prednisone   DNR/DNI  Patient's son to bring oxygen device in tomorrow to discuss CPAP vs BiPAP with RT, will attempt to get approval for trilogy, though notes from 3/2023 state he is unable to afford at this time    Subjective:   Mr. Jenna Nichols is feeling greatly improved. He feels more energetic, has an appetitive and reports eating all of his food. States that his breathing is better. No complains of cough at this time. Feels cold all the time. Wants to go home. Denies: fever, chills, chest pain, palpitations, abdominal pain, headache, dizziness    Objective:   Vitals: Blood pressure 102/63, pulse 80, temperature (!) 97.4 °F (36.3 °C), temperature source Axillary, resp. rate 20, weight 47.2 kg (104 lb), SpO2 93 %. 6.5L, Body mass index is 20.31 kg/m².       Intake/Output Summary (Last 24 hours) at 7/20/2023 0852  Last data filed at 7/20/2023 0815  Gross per 24 hour   Intake 540 ml   Output 1350 ml   Net -810 ml         Physical Exam    Gen: alert, cachectic   CV: Regular rate, mild BLE edema improved from yesterday  Lungs: diffuse wheezing bilaterally, rhonchi bl, breathing comfortably on 6.5L  Abdomen: soft, non-tender  Neuro: A&Ox3, no focal deficits noted    Labs: I have personally reviewed pertinent lab results. Laboratory and Diagnostics  Results from last 7 days   Lab Units 07/19/23  0618 07/19/23  0245   WBC Thousand/uL 10.45* 11.08*   HEMOGLOBIN g/dL 12.6 12.0   HEMATOCRIT % 37.4 36.1*   PLATELETS Thousands/uL 159 204   NEUTROS PCT % 94* 79*   MONOS PCT % 3* 12   EOS PCT % 0 0     Results from last 7 days   Lab Units 07/20/23  0516 07/19/23  1225 07/19/23  0245   SODIUM mmol/L 126* 123* 123*   POTASSIUM mmol/L 3.5 3.3* 3.3*   CHLORIDE mmol/L 73* 70* 71*   CO2 mmol/L >45* >45* >45*   BUN mg/dL 19 14 16   CREATININE mg/dL 0.78 0.76 0.77   CALCIUM mg/dL 9.0 9.0 8.8   GLUCOSE RANDOM mg/dL 116 243* 144*   ALT U/L 20  --  23   AST U/L 31  --  43   ALK PHOS U/L 57  --  64   ALBUMIN g/dL 3.2*  --  3.5   TOTAL BILIRUBIN mg/dL 0.75  --  0.82     Results from last 7 days   Lab Units 07/20/23  0516   MAGNESIUM mg/dL 2.3      Results from last 7 days   Lab Units 07/19/23  0618   INR  0.78*   PTT seconds 19*      Results from last 7 days   Lab Units 07/20/23  0516 07/19/23  0828   PROCALCITONIN ng/ml 0.06 0.05          Microbiology:  Covid/Flu/RSV 7/19/23: negative    Imaging and other studies: I have personally reviewed pertinent reports. CXR 7/19/23:  Emphysematous changes. No focal airspace consolidation. Mild pulmonary vascular congestion. __________  Minor MD Niranjan  Internal Medicine Residency PGY-I  19[address] [facility]: Portions of the record may have been created with voice recognition software. Occasional wrong word or "sound a like" substitutions may have occurred due to the inherent limitations of voice recognition software. Careful consideration should be taken to recognize, using context, where substitutions have occurred.

## 2023-07-20 NOTE — PLAN OF CARE
Problem: PHYSICAL THERAPY ADULT  Goal: Performs mobility at highest level of function for planned discharge setting. See evaluation for individualized goals. Description: Treatment/Interventions: Functional transfer training, ADL retraining, LE strengthening/ROM, Elevations, Therapeutic exercise, Endurance training, Cognitive reorientation, Patient/family training, Equipment eval/education, Bed mobility, Gait training, Spoke to nursing, Spoke to case management, OT  Equipment Recommended: Cynthia Members (owns)       See flowsheet documentation for full assessment, interventions and recommendations. 7/20/2023 1345 by Graeme Salgado  Note: Prognosis: Fair  Problem List: Decreased strength, Impaired balance, Decreased endurance, Decreased mobility, Decreased cognition, Impaired judgement, Decreased safety awareness, Pain  Assessment: Pt seen for skilled PT treatment session today. He was greeted supine in bed with his wife and son visiting. Pt continues to be severely limited by shortness of breath and dyspnea on exertion. He reported feeling out of breath upon sitting EOB and after performing 5 repetitions of LAQ on R leg. Pt's wife reports he is on 3L O2 at home, but pt is utilizing 5L O2 via NC this session. Pt reports difficulty with ambulating longer than short household distances due to impaired breathing. His wife reported that pt sleeps with many pillows at home; discussed with wife, PT, and CM regarding potential ability to get hospital bed for home to allow for improved breathing ability when sleeping at home. Pt's wife also stated that pt uses scooter when mobilizing around grocery store, and expressed interest in potentially obtaining one for home/community use. Pt required supervision level assist for bed mobility this session, and min Ax1 for sit<>stand and short-distance ambulation (3ft) from bed to chair with HHA. Pt excessively flexed forward during all movement, as he says it helps him breathe better.  The patient's AM-PAC Basic Mobility Inpatient Short Form Raw Score is 17. A Raw score of greater than or equal to 16 suggests the patient may benefit from discharge to home. Please also refer to the recommendation of the Physical Therapist for safe discharge planning. Pt would benefit from continued skilled PT intervention to address the aforementioned impairments to allow for optimal functional mobility, safety, and independence upon discharge. All current goals and d/c recommendation still appropriate at this time. Continue to recommend home with home health PT pending adequate support at home. At end of session, pt was left sitting comfortably in recliner with call bell in reach and all current needs met. Barriers to Discharge: Inaccessible home environment, Decreased caregiver support     PT Discharge Recommendation: Home with home health rehabilitation (pending adequate support at home)    See flowsheet documentation for full assessment.

## 2023-07-20 NOTE — PHYSICAL THERAPY NOTE
PHYSICAL THERAPY NOTE          Patient Name: Latonia Kerns. Today's Date: 7/20/2023 07/20/23 1033   PT Last Visit   PT Visit Date 07/20/23   Note Type   Note Type Treatment   Pain Assessment   Pain Assessment Tool 0-10   Pain Score No Pain   Restrictions/Precautions   Weight Bearing Precautions Per Order No   Other Precautions Cognitive; Chair Alarm; Bed Alarm;Multiple lines;Telemetry;O2;Fall Risk   General   Chart Reviewed Yes   Cognition   Overall Cognitive Status Impaired   Arousal/Participation Responsive;Arousable; Cooperative   Attention Attends with cues to redirect   Orientation Level Oriented X4   Memory Decreased recall of precautions   Following Commands Follows one step commands with increased time or repetition   Bed Mobility   Supine to Sit 5  Supervision (pt sat EOB approx. 7min with fair trunk control; noted extremely flexed trunk posture, as pt says it is easier to breathe in this manner)   Additional items HOB elevated; Increased time required   Sit to Supine Unable to assess  (pt left sitting in recliner with call bell in reach and all needs met)   Transfers   Sit to Stand 4  Minimal assistance   Additional items Assist x 1; Increased time required; Impulsive   Stand to Sit 4  Minimal assistance   Additional items Assist x 1; Increased time required; Impulsive   Ambulation/Elevation   Gait pattern Decreased foot clearance;Shuffling; Foward flexed; Short stride; Excessively slow;Knees flexed   Gait Assistance 4  Minimal assist   Additional items Assist x 1;Verbal cues   Assistive Device None   Distance 3ft bed to chair   Balance   Static Sitting Fair   Dynamic Sitting Fair -   Static Standing Fair -   Dynamic Standing Poor +   Ambulatory Poor +   Endurance Deficit   Endurance Deficit Yes   Endurance Deficit Description O2 fluctuated between 86-94% on 5L O2 via NC during session today   Activity Tolerance   Activity Tolerance Patient tolerated treatment well;Patient limited by fatigue   Medical Staff Made Aware PT Evette   Exercises   Knee AROM Long Arc Quad Sitting;5 reps;AROM; Bilateral   Assessment   Prognosis Fair   Problem List Decreased strength; Impaired balance;Decreased endurance;Decreased mobility; Decreased cognition; Impaired judgement;Decreased safety awareness;Pain   Assessment Pt seen for skilled PT treatment session today. He was greeted supine in bed with his wife and son visiting. Pt continues to be severely limited by shortness of breath and dyspnea on exertion. He reported feeling out of breath upon sitting EOB and after performing 5 repetitions of LAQ on R leg. Pt's wife reports he is on 3L O2 at home, but pt is utilizing 5L O2 via NC this session. Pt reports difficulty with ambulating longer than short household distances due to impaired breathing. His wife reported that pt sleeps with many pillows at home; discussed with wife, PT, and CM regarding potential ability to get hospital bed for home to allow for improved breathing ability when sleeping at home. Pt's wife also stated that pt uses scooter when mobilizing around grocery store, and expressed interest in potentially obtaining one for home/community use. Pt required supervision level assist for bed mobility this session, and min Ax1 for sit<>stand and short-distance ambulation (3ft) from bed to chair with HHA. Pt excessively flexed forward during all movement, as he says it helps him breathe better. The patient's AM-PAC Basic Mobility Inpatient Short Form Raw Score is 17. A Raw score of greater than or equal to 16 suggests the patient may benefit from discharge to home. Please also refer to the recommendation of the Physical Therapist for safe discharge planning. Pt would benefit from continued skilled PT intervention to address the aforementioned impairments to allow for optimal functional mobility, safety, and independence upon discharge.  All current goals and d/c recommendation still appropriate at this time. Continue to recommend home with home health PT pending adequate support at home. At end of session, pt was left sitting comfortably in recliner with call bell in reach and all current needs met. Goals   Patient Goals to breathe better; to go home   STG Expiration Date 08/02/23   Plan   Progress Slow progress, medical status limitations   Recommendation   Discharge Recommendation Home with home Pt services (pt is declining home PT)   Equipment Recommended   (hospital bed; pt sleeps in standard twin-size bed, propped up on multiple pillows due to respiratory issues. )   AM-PAC Basic Mobility Inpatient   Turning in Flat Bed Without Bedrails 4   Lying on Back to Sitting on Edge of Flat Bed Without Bedrails 3   Moving Bed to Chair 3   Standing Up From Chair Using Arms 3   Walk in Room 3   Climb 3-5 Stairs With Railing 1   Basic Mobility Inpatient Raw Score 17   Basic Mobility Standardized Score 39.67   Highest Level Of Mobility   JH-HLM Goal 5: Stand one or more mins   JH-HLM Achieved 4: Move to chair/commode

## 2023-07-20 NOTE — PROGRESS NOTES
Pt AAOx4, answering questions appropriately and watching TV. Bipap on. Pt has no complaints at this time.   Bed alarm on

## 2023-07-20 NOTE — PLAN OF CARE
Problem: Nutrition/Hydration-ADULT  Goal: Nutrient/Hydration intake appropriate for improving, restoring or maintaining nutritional needs  Description: Monitor and assess patient's nutrition/hydration status for malnutrition. Collaborate with interdisciplinary team and initiate plan and interventions as ordered. Monitor patient's weight and dietary intake as ordered or per policy. Utilize nutrition screening tool and intervene as necessary. Determine patient's food preferences and provide high-protein, high-caloric foods as appropriate.      INTERVENTIONS:  - Monitor oral intake, urinary output, labs, and treatment plans  - Assess nutrition and hydration status and recommend course of action  - Evaluate amount of meals eaten  - Assist patient with eating if necessary   - Allow adequate time for meals  - Recommend/ encourage appropriate diets, oral nutritional supplements, and vitamin/mineral supplements  - Order, calculate, and assess calorie counts as needed  - Recommend, monitor, and adjust tube feedings and TPN/PPN based on assessed needs  - Assess need for intravenous fluids  - Provide specific nutrition/hydration education as appropriate  - Include patient/family/caregiver in decisions related to nutrition  Outcome: Not Progressing

## 2023-07-21 VITALS
SYSTOLIC BLOOD PRESSURE: 103 MMHG | OXYGEN SATURATION: 95 % | TEMPERATURE: 96.3 F | WEIGHT: 98.2 LBS | HEART RATE: 103 BPM | RESPIRATION RATE: 20 BRPM | BODY MASS INDEX: 19.18 KG/M2 | DIASTOLIC BLOOD PRESSURE: 60 MMHG

## 2023-07-21 LAB
BUN SERPL-MCNC: 21 MG/DL (ref 5–25)
CALCIUM SERPL-MCNC: 9.3 MG/DL (ref 8.3–10.1)
CHLORIDE SERPL-SCNC: 80 MMOL/L (ref 96–108)
CO2 SERPL-SCNC: >45 MMOL/L (ref 21–32)
CREAT SERPL-MCNC: 0.7 MG/DL (ref 0.6–1.3)
DME PARACHUTE DELIVERY DATE ACTUAL: NORMAL
DME PARACHUTE DELIVERY DATE REQUESTED: NORMAL
DME PARACHUTE DELIVERY NOTE: NORMAL
DME PARACHUTE ITEM DESCRIPTION: NORMAL
DME PARACHUTE ORDER STATUS: NORMAL
DME PARACHUTE SUPPLIER NAME: NORMAL
DME PARACHUTE SUPPLIER PHONE: NORMAL
GFR SERPL CREATININE-BSD FRML MDRD: 98 ML/MIN/1.73SQ M
GLUCOSE SERPL-MCNC: 165 MG/DL (ref 65–140)
GLUCOSE SERPL-MCNC: 202 MG/DL (ref 65–140)
GLUCOSE SERPL-MCNC: 232 MG/DL (ref 65–140)
POTASSIUM SERPL-SCNC: 3.7 MMOL/L (ref 3.5–5.3)
SODIUM SERPL-SCNC: 131 MMOL/L (ref 135–147)

## 2023-07-21 PROCEDURE — 97116 GAIT TRAINING THERAPY: CPT

## 2023-07-21 PROCEDURE — 82948 REAGENT STRIP/BLOOD GLUCOSE: CPT

## 2023-07-21 PROCEDURE — 94660 CPAP INITIATION&MGMT: CPT | Performed by: SOCIAL WORKER

## 2023-07-21 PROCEDURE — 94640 AIRWAY INHALATION TREATMENT: CPT

## 2023-07-21 PROCEDURE — 94664 DEMO&/EVAL PT USE INHALER: CPT

## 2023-07-21 PROCEDURE — 99232 SBSQ HOSP IP/OBS MODERATE 35: CPT | Performed by: INTERNAL MEDICINE

## 2023-07-21 PROCEDURE — 94760 N-INVAS EAR/PLS OXIMETRY 1: CPT

## 2023-07-21 PROCEDURE — 92526 ORAL FUNCTION THERAPY: CPT | Performed by: SPEECH-LANGUAGE PATHOLOGIST

## 2023-07-21 PROCEDURE — 80048 BASIC METABOLIC PNL TOTAL CA: CPT | Performed by: NURSE PRACTITIONER

## 2023-07-21 PROCEDURE — 99239 HOSP IP/OBS DSCHRG MGMT >30: CPT | Performed by: INTERNAL MEDICINE

## 2023-07-21 RX ORDER — PREDNISONE 10 MG/1
60 TABLET ORAL DAILY
Qty: 45 TABLET | Refills: 0 | Status: SHIPPED | OUTPATIENT
Start: 2023-07-22 | End: 2023-07-29

## 2023-07-21 RX ORDER — DOCUSATE SODIUM 100 MG/1
100 CAPSULE, LIQUID FILLED ORAL 2 TIMES DAILY
Status: DISCONTINUED | OUTPATIENT
Start: 2023-07-21 | End: 2023-07-21 | Stop reason: HOSPADM

## 2023-07-21 RX ORDER — DOCUSATE SODIUM 100 MG/1
100 CAPSULE, LIQUID FILLED ORAL 2 TIMES DAILY
Qty: 60 CAPSULE | Refills: 0 | Status: SHIPPED | OUTPATIENT
Start: 2023-07-21

## 2023-07-21 RX ORDER — TORSEMIDE 10 MG/1
30 TABLET ORAL DAILY
Qty: 90 TABLET | Refills: 0 | Status: ON HOLD | OUTPATIENT
Start: 2023-07-21 | End: 2023-07-29 | Stop reason: SDUPTHER

## 2023-07-21 RX ORDER — TORSEMIDE 20 MG/1
20 TABLET ORAL DAILY
Status: DISCONTINUED | OUTPATIENT
Start: 2023-07-21 | End: 2023-07-21

## 2023-07-21 RX ORDER — AZITHROMYCIN 500 MG/1
500 TABLET, FILM COATED ORAL EVERY 24 HOURS
Qty: 1 TABLET | Refills: 0 | Status: ON HOLD | OUTPATIENT
Start: 2023-07-21 | End: 2023-07-29

## 2023-07-21 RX ORDER — PREDNISONE 5 MG/1
5 TABLET ORAL DAILY
Qty: 30 TABLET | Refills: 0 | Status: ON HOLD | OUTPATIENT
Start: 2023-08-05 | End: 2023-07-29 | Stop reason: SDUPTHER

## 2023-07-21 RX ORDER — SENNOSIDES 8.6 MG
8.6 TABLET ORAL
Qty: 30 TABLET | Refills: 0 | Status: SHIPPED | OUTPATIENT
Start: 2023-07-21

## 2023-07-21 RX ORDER — POTASSIUM CHLORIDE 20 MEQ/1
20 TABLET, EXTENDED RELEASE ORAL 2 TIMES DAILY
Qty: 60 TABLET | Refills: 0 | Status: SHIPPED | OUTPATIENT
Start: 2023-07-21

## 2023-07-21 RX ORDER — FAMOTIDINE 20 MG/1
20 TABLET, FILM COATED ORAL 2 TIMES DAILY
Qty: 28 TABLET | Refills: 0 | Status: SHIPPED | OUTPATIENT
Start: 2023-07-21 | End: 2023-08-14

## 2023-07-21 RX ORDER — POLYETHYLENE GLYCOL 3350 17 G/17G
17 POWDER, FOR SOLUTION ORAL ONCE
Status: COMPLETED | OUTPATIENT
Start: 2023-07-21 | End: 2023-07-21

## 2023-07-21 RX ADMIN — DOCUSATE SODIUM 100 MG: 100 CAPSULE ORAL at 13:52

## 2023-07-21 RX ADMIN — BUDESONIDE 0.5 MG: 0.5 INHALANT ORAL at 08:05

## 2023-07-21 RX ADMIN — INSULIN LISPRO 1 UNITS: 100 INJECTION, SOLUTION INTRAVENOUS; SUBCUTANEOUS at 08:43

## 2023-07-21 RX ADMIN — LEVALBUTEROL HYDROCHLORIDE 1.25 MG: 1.25 SOLUTION RESPIRATORY (INHALATION) at 08:05

## 2023-07-21 RX ADMIN — IPRATROPIUM BROMIDE 0.5 MG: 0.5 SOLUTION RESPIRATORY (INHALATION) at 08:09

## 2023-07-21 RX ADMIN — POTASSIUM CHLORIDE 20 MEQ: 1500 TABLET, EXTENDED RELEASE ORAL at 08:43

## 2023-07-21 RX ADMIN — HEPARIN SODIUM 5000 UNITS: 5000 INJECTION INTRAVENOUS; SUBCUTANEOUS at 05:00

## 2023-07-21 RX ADMIN — PREDNISONE 60 MG: 20 TABLET ORAL at 08:42

## 2023-07-21 RX ADMIN — GUAIFENESIN 1200 MG: 600 TABLET, EXTENDED RELEASE ORAL at 08:42

## 2023-07-21 RX ADMIN — TORSEMIDE 20 MG: 20 TABLET ORAL at 10:38

## 2023-07-21 RX ADMIN — POLYETHYLENE GLYCOL 3350 17 G: 17 POWDER, FOR SOLUTION ORAL at 13:52

## 2023-07-21 RX ADMIN — ASPIRIN 81 MG CHEWABLE TABLET 81 MG: 81 TABLET CHEWABLE at 08:42

## 2023-07-21 RX ADMIN — HEPARIN SODIUM 5000 UNITS: 5000 INJECTION INTRAVENOUS; SUBCUTANEOUS at 13:03

## 2023-07-21 RX ADMIN — INSULIN LISPRO 2 UNITS: 100 INJECTION, SOLUTION INTRAVENOUS; SUBCUTANEOUS at 13:03

## 2023-07-21 NOTE — QUICK NOTE
Patient has Chronic Hypercarbic Respiratory failure due to COPD. He has evidence for CO2 retention and requires noninvasive ventilation at home for optimal management. BiPAP was tried and failed due to the inability to provide noninvasive ventilation of adequate degree. Continuous alarm systems and backup are required for optimal management due to power outage. This patient is at high risk for respiratory failure without noninvasive ventilation at home. This could prevent repeated hospital admissions and improve the quality of life for the patient. Please provide Astral NIV-no substitution for remote monitoring access and auto epap.

## 2023-07-21 NOTE — SPEECH THERAPY NOTE
Speech Language/Pathology    Speech/Language Pathology Progress Note    Patient Name: John Baeza. Today's Date: 7/21/2023    Subjective:  Patient reports ongoing SOB with meals and that meals take a long time to eat- often does not complete meals due to "getting sick of eating." Patients wife also reports that he will try to snack throughout the day but breathing continues to be a major barrier for intake. He reportedly has lost at least 10lbs in the last two months which is how long this has been going on. Does not take any supplements currently. Objective:  Patient seen upright in recliner. He consumed a hamburger and thin liquids. SPO@ remained WFL throughout however some increase in RR often into th e 40s. He required a few minutes after each bite to reduce RR and feel ready to take the next bite. Mastication was prolonged ( as expected given edentulous status) but functional appearing. Oral processing was adequate. Swallow was prompt. HLE appeared adequate. No sxs of aspiration noted. Patient on 3L ( baseline) - this was increased to 5L with no benefit. Discussed concern for malnutrition with patient and family with close monitoring of weight as well as consideration for supplements. Also introduced the idea of potential for supplemental/alterntive means of nutrition if the future if this does not improve and he continues to lose. Reciprocal comprehension was verbally expressed. Reviewed with SLIM and pulmonary. Assessment:  Patient continues with increased SOB when eating- this appears to be impacting adequate nutrition intake. His oropharyngeal swallow continues to appear functional with no sxs of aspiration at this time. Plan/Recommendations:  Continue current diet.    Meds as tolerated  Consider supplements  Closely monitor weight     No further ST indicated at this time    Carmenza Sanchez M.S., [address] [facility]  Speech Language Pathologist   Available via [address] [place] [address] #[id number]  [place] #[id number]

## 2023-07-21 NOTE — ASSESSMENT & PLAN NOTE
Wt Readings from Last 3 Encounters:   07/20/23 47.2 kg (104 lb)   06/26/23 43.8 kg (96 lb 8 oz)   06/16/23 43.1 kg (95 lb)   · Presented to the ED on 7/19/2023 with shortness of breath and increased somnolence over a few weeks with a fall from bed on the day of presentation  · Stage IV CHF.  EF 20% on 5/1/23  · Home diuretic - torsemide 20 mg daily  · Mild pulmonary vascular congestion along with emphysema noted on chest x-ray  · On Furosemide 40 mg IV twice daily  · Cardiology following - plan to switch to torsemide 30 mg daily from a.m  · Monitor I/O, daily weights  · Cardiology input appreciated  · Family aware of poor prognosis

## 2023-07-21 NOTE — ASSESSMENT & PLAN NOTE
· Improved with diuresis from 123 to 126   · Baseline sodium 130 to 131  · Has received tolvaptan in the past  · Discussed with nephrology - Tolvaptan 15 mg ordered for today

## 2023-07-21 NOTE — PROGRESS NOTES
[address] [facility]  Progress Note  Name: Bennett Henriquez  MRN: [id number]  Unit/Bed#: [facility] 510-01 I Date of Admission: 7/19/2023   Date of Service: 7/20/2023 I Hospital Day: 1    Assessment/Plan   * Acute on chronic HFrEF (heart failure with reduced ejection fraction) (HCC)  Assessment & Plan  Wt Readings from Last 3 Encounters:   07/20/23 47.2 kg (104 lb)   06/26/23 43.8 kg (96 lb 8 oz)   06/16/23 43.1 kg (95 lb)   · Presented to the ED on 7/19/2023 with shortness of breath and increased somnolence over a few weeks with a fall from bed on the day of presentation  · Stage IV CHF.  EF 20% on 5/1/23  · Home diuretic - torsemide 20 mg daily  · Mild pulmonary vascular congestion along with emphysema noted on chest x-ray  · On Furosemide 40 mg IV twice daily  · Cardiology following - plan to switch to torsemide 30 mg daily from a.m  · Monitor I/O, daily weights  · Cardiology input appreciated  · Family aware of poor prognosis      End-stage COPD with acute exacerbation (HCC)  Assessment & Plan  · On Solumedrol (dose decreased today to 40 mg q 12h), Xopenex/Atrovent/Pulmicort nebs, azithromycin 500 mg daily for 3 days  · Pulm following -plan to transition to prednisone 60 mg daily on 7/21 with taper by 10 mg every 3 days  · Family aware of poor prognosis    Acute on chronic respiratory failure with hypoxia (HCC)  Assessment & Plan  · Due to above  · O2 requirements increased to 6 L  · Treat as above  · Wean O2 as able    Acute on chronic hyponatremia  Assessment & Plan  · Improved with diuresis from 123 to 126   · Baseline sodium 130 to 131  · Has received tolvaptan in the past  · Discussed with nephrology - Tolvaptan 15 mg ordered for today      Chronic hypercapnia/KEVIN  Assessment & Plan  · On CPAP vs Bipap hs at home   · Family to bring in home equipment tomorrow  · Ct Bipap hs at present  · Discussed with pulmonology - attempting to see if he can qualify for trilogy ventilator    Goals of care, counseling/discussion  Assessment & Plan  · Family aware of poor prognosis from end-stage COPD and advanced heart failure  · They want to continue with DNR/DNI status and current medications    Prediabetes  Assessment & Plan  · HbA1c 5.7  · Hyperglycemia in the setting of steroids  · SSI  · Monitor blood sugars    Nonobstructive atherosclerosis of coronary artery  Assessment & Plan  · Continue ASA    Metabolic encephalopathy  Assessment & Plan  · In the setting of above  · Improved  · Supportive care  · Treat above conditions    Prostate cancer [facility])  Assessment & Plan  · Outpatient follow-up    VTE Pharmacologic Prophylaxis: VTE Score: 5 High Risk (Score >/= 5) - Pharmacological DVT Prophylaxis Ordered: heparin. Sequential Compression Devices Ordered. Patient Centered Rounds: Discussed with RN  Discussions with Specialists or Other Care Team Provider: Discussed with nephrology and pulmonology    Education and Discussions with Family / Patient: Updated  (wife) at bedside. Total Time Spent on Date of Encounter in care of patient: 45 minutes This time was spent on one or more of the following: performing physical exam; counseling and coordination of care; obtaining or reviewing history; documenting in the medical record; reviewing/ordering tests, medications or procedures; communicating with other healthcare professionals and discussing with patient's family/caregivers. Current Length of Stay: 1 day(s)  Current Patient Status: Inpatient   Certification Statement: The patient will continue to require additional inpatient hospital stay due to COPD and CHF with acute exacerbation, hyponatremia    Code Status: Level 3 - DNAR and DNI    Subjective:   Shortness of breath improved. No fever.      Objective:     Vitals:   Temp (24hrs), Av.6 °F (36.4 °C), Min:96.7 °F (35.9 °C), Max:98.1 °F (36.7 °C)    Temp:  [96.7 °F (35.9 °C)-98.1 °F (36.7 °C)] 96.7 °F (35.9 °C)  HR:  [] 103  Resp: [16-20] 20  BP: ()/(52-78) 113/78  SpO2:  [93 %-98 %] 96 %  Body mass index is 20.31 kg/m². Physical Exam:   Physical Exam  Vitals reviewed. HENT:      Head: Normocephalic. Nose: Nose normal.      Mouth/Throat:      Mouth: Mucous membranes are moist.   Eyes:      Extraocular Movements: Extraocular movements intact. Cardiovascular:      Rate and Rhythm: Normal rate and regular rhythm. Pulmonary:      Effort: Pulmonary effort is normal.      Comments: Decreased breath sounds bilaterally  Abdominal:      General: Bowel sounds are normal. There is no distension. Palpations: Abdomen is soft. Tenderness: There is no abdominal tenderness. Musculoskeletal:      Cervical back: Neck supple. Right lower leg: Edema present. Left lower leg: Edema present. Skin:     General: Skin is dry. Neurological:      General: No focal deficit present. Mental Status: He is alert.           Additional Data:     Labs:  Results from last 7 days   Lab Units 07/19/23  0618   WBC Thousand/uL 10.45*   HEMOGLOBIN g/dL 12.6   HEMATOCRIT % 37.4   PLATELETS Thousands/uL 159   NEUTROS PCT % 94*   LYMPHS PCT % 3*   MONOS PCT % 3*   EOS PCT % 0     Results from last 7 days   Lab Units 07/20/23  0516   SODIUM mmol/L 126*   POTASSIUM mmol/L 3.5   CHLORIDE mmol/L 73*   CO2 mmol/L >45*   BUN mg/dL 19   CREATININE mg/dL 0.78   CALCIUM mg/dL 9.0   ALBUMIN g/dL 3.2*   TOTAL BILIRUBIN mg/dL 0.75   ALK PHOS U/L 57   ALT U/L 20   AST U/L 31   GLUCOSE RANDOM mg/dL 116     Results from last 7 days   Lab Units 07/19/23  0618   INR  0.78*     Results from last 7 days   Lab Units 07/20/23  2046 07/20/23  1548 07/20/23  1050 07/20/23  0609 07/19/23  2256 07/19/23  1753 07/19/23  1318 07/19/23  0657   POC GLUCOSE mg/dl 160* 144* 288* 132 213* 273* 246* 190*     Results from last 7 days   Lab Units 07/20/23  0516   HEMOGLOBIN A1C % 5.7*     Results from last 7 days   Lab Units 07/20/23  0516 07/19/23  0828   PROCALCITONIN ng/ml 0.06 0.05       Lines/Drains:  Invasive Devices     Peripheral Intravenous Line  Duration           Peripheral IV 07/19/23 Right;Ventral (anterior) Forearm 1 day          Drain  Duration           External Urinary Catheter Small 1 day                Last 24 Hours Medication List:   Current Facility-Administered Medications   Medication Dose Route Frequency Provider Last Rate   • acetaminophen  650 mg Oral Q6H PRN Milo More, DO     • aspirin  81 mg Oral Daily Milo More, DO     • azithromycin  500 mg Oral Q24H Flor Ceballos MD     • budesonide  0.5 mg Nebulization Q12H Milo More, DO     • ergocalciferol  50,000 Units Oral Q28 Days Milo More, DO     • guaiFENesin  1,200 mg Oral Q12H [facility] Milo More, DO     • heparin (porcine)  5,000 Units Subcutaneous Q8H [facility] Milo More, DO     • insulin lispro  1-5 Units Subcutaneous TID AC Meek Fajuliette Romeroinic, DO     • insulin lispro  1-5 Units Subcutaneous HS Meek Fajuliette Spencer, DO     • ipratropium  0.5 mg Nebulization TID Milo More, DO     • levalbuterol  1.25 mg Nebulization TID Milo More, DO     • methylPREDNISolone sodium succinate  40 mg Intravenous Q12H [facility] Taras Carr MD     • potassium chloride  20 mEq Oral BID DELIA Choudhury     • [START ON 7/21/2023] predniSONE  60 mg Oral Daily Taras Carr MD      Followed by   • [START ON 7/24/2023] predniSONE  50 mg Oral Daily Taras Carr MD      Followed by   • [START ON 7/27/2023] predniSONE  40 mg Oral Daily Taras Carr MD      Followed by   • [START ON 7/30/2023] predniSONE  30 mg Oral Daily Taras Carr MD      Followed by   • [START ON 8/2/2023] predniSONE  20 mg Oral Daily Taras Carr MD      Followed by   • [START ON 8/5/2023] predniSONE  10 mg Oral Daily Taras Carr MD     • tolvaptan  15 mg Oral Once Lamont Seen, DO          Today, Patient Was Seen By: Fermin Cook MD    **Please Note: This note may have been constructed using a voice recognition system. **

## 2023-07-21 NOTE — PROGRESS NOTES
Progress Note - Pulmonary   [place]. 79 y.o. male MRN: [id number]  Unit/Bed#: [facility] 510-01 Encounter: [id number]      Assessment:  Failure to thrive, poor PO intake, weakness, dyspnea, increasing solmnolence in the setting of end-stage COPD and stage IV heart failure (EF 20%). Given ABG results (compensated respiratory acidosis) and improvement with diuresis, suspect that his decline was mostly due to heart failure exacerbation more than COPD exacerbation. We encourage more discussions with palliative as the prognosis is poor and patient's baseline continues to decline. Patient has moved to DNR/DNI though family still wants him to receive treatment for his conditions. Plan:  Treated for COPD exacerbation with acute on chronic hypoxemic respiratory failure, continue current regimen: solumedrol 40mg q8h, xopenex, ipratropium, 3 doses of azithromycin with last dose today, BiPAP when sleeping, wean O2 as able for SPO2 >88%  Will decrease to IV solumedrol BID while inpatient and then switch to PO prednisone 60mg daily with taper by 10mg q3d on discharge until back to baseline 5mg prednisone   DNR/DNI  Patient's son brought oxygen device in today to discuss CPAP vs BiPAP with RT, will attempt to get approval for trilogy, though notes from 3/2023 state he is unable to afford at this time    Subjective:   He is feeling well today. Reports good energy though has no appetite. Feels breathing is slightly improved. Denies fever or chills, abdominal pain, changes to bladder or bowel function. Is eager to go home. Objective:   Vitals: Blood pressure 103/67, pulse 88, temperature 97.9 °F (36.6 °C), temperature source Axillary, resp. rate 22, weight 44.5 kg (98 lb 3.2 oz), SpO2 94 %. 3L, Body mass index is 19.18 kg/m².       Intake/Output Summary (Last 24 hours) at 7/21/2023 0855  Last data filed at 7/21/2023 0802  Gross per 24 hour   Intake 1122 ml   Output 2996 ml   Net -1874 ml       Physical Exam  Gen: alert, cachectic   CV: Regular rate, 2+ BLE edema L>R  worse than yesterday  Lungs: diffuse wheezing bilaterally, rhonchi bl, breathing comfortably on 3L  Abdomen: soft, non-tender  Neuro: A&Ox3, no focal deficits noted    Labs: I have personally reviewed pertinent lab results. Laboratory and Diagnostics  Results from last 7 days   Lab Units 07/19/23  0618 07/19/23  0245   WBC Thousand/uL 10.45* 11.08*   HEMOGLOBIN g/dL 12.6 12.0   HEMATOCRIT % 37.4 36.1*   PLATELETS Thousands/uL 159 204   NEUTROS PCT % 94* 79*   MONOS PCT % 3* 12   EOS PCT % 0 0     Results from last 7 days   Lab Units 07/21/23  0442 07/20/23  0516 07/19/23  1225 07/19/23  0245   SODIUM mmol/L 131* 126* 123* 123*   POTASSIUM mmol/L 3.7 3.5 3.3* 3.3*   CHLORIDE mmol/L 80* 73* 70* 71*   CO2 mmol/L >45* >45* >45* >45*   BUN mg/dL 21 19 14 16   CREATININE mg/dL 0.70 0.78 0.76 0.77   CALCIUM mg/dL 9.3 9.0 9.0 8.8   GLUCOSE RANDOM mg/dL 202* 116 243* 144*   ALT U/L  --  20  --  23   AST U/L  --  31  --  43   ALK PHOS U/L  --  57  --  64   ALBUMIN g/dL  --  3.2*  --  3.5   TOTAL BILIRUBIN mg/dL  --  0.75  --  0.82     Results from last 7 days   Lab Units 07/20/23  0516   MAGNESIUM mg/dL 2.3      Results from last 7 days   Lab Units 07/19/23  0618   INR  0.78*   PTT seconds 19*          Results from last 7 days   Lab Units 07/20/23  0516 07/19/23  0828   PROCALCITONIN ng/ml 0.06 0.05       ABG:   Results from last 7 days   Lab Units 07/20/23  1437   PH ART  7.438   PCO2 ART mm Hg 78.4*   PO2 ART mm Hg 75.7   HCO3 ART mmol/L 51.8*   BASE EXC ART mmol/L 23.3   ABG SOURCE  Radial, Left       Microbiology:  Covid/Flu/RSV 7/19/23: negative     Imaging and other studies: I have personally reviewed pertinent reports. CXR 7/19/23:  Emphysematous changes. No focal airspace consolidation. Mild pulmonary vascular congestion.    ______________________  Alba Ortiz MD  Internal Medicine Residency PGY-I  [address]: Portions of the record may have been created with voice recognition software. Occasional wrong word or "sound a like" substitutions may have occurred due to the inherent limitations of voice recognition software. Careful consideration should be taken to recognize, using context, where substitutions have occurred.

## 2023-07-21 NOTE — ASSESSMENT & PLAN NOTE
· On CPAP vs Bipap hs at home   · Family to bring in home equipment tomorrow  · Ct Bipap hs at present  · Discussed with pulmonology - attempting to see if he can qualify for trilogy ventilator

## 2023-07-21 NOTE — ASSESSMENT & PLAN NOTE
· On Solumedrol (dose decreased today to 40 mg q 12h), Xopenex/Atrovent/Pulmicort nebs, azithromycin 500 mg daily for 3 days  · Pulm following -plan to transition to prednisone 60 mg daily on 7/21 with taper by 10 mg every 3 days  · Family aware of poor prognosis

## 2023-07-21 NOTE — PROGRESS NOTES
NEPHROLOGY HOSPITAL PROGRESS NOTE   Herb Lewis. 79 y.o. male MRN: [id number]  Unit/Bed#: [facility] 510-01 Encounter: [id number]  Reason for Consult: Hyponatremia    ASSESSMENT and PLAN:    Felipa Bruno Jr. is a 79 y. o. male with a past medical history of end-stage COPD, CHF, CAD and hyponatremia who was admitted to [facility] presenting with change in appetite, confusion, increased respiratory distress.   Nephrology consulted for management of chronic hyponatremia.     Acute on chronic hypoosmolar hyponatremia:  • Etiology: Volume mediated, +/- SIADH. ? Generally managed with fluid restriction (home fluid restriction at 2 L) and loop diuretic.    Required higher than usual dose of tolvaptan during recent hospitalization. Unfortunately unable to maintain tolvaptan for home use due to cost.  ? Poor oral intake at home. Eating soup and Jell-O likely exacerbated underlying problem  • Sodium level 123 on admission improving with loop diuretic, tolvaptan 10  • Management: Loop diuretic, fluid restriction, increase solute intake with the addition of nutrition drink. Keep potassium level near 4.0  • Sodium level improving, up to 126. • Work-up:   ? Serum osmolality 273, urine osmolality 261, urine sodium 38. Receiving loop diuretic. TSH normal  ? Cortisol 4.9, TSH normal.  Recent uric acid 2.3 consistent with SIADH.  Recent serum osmolality low 272. • Plan/recommendations:  ? Continue loop diuretic  ? Discussed outpatient plan with patient, family members at length. Discussed use of diet supplement, eating small more frequent meals rather than large meals. Limiting fluid intake and use of electrolyte based fluids such as propel, Gatorade  ? Potassium supplement. Keep potassium level near 4.0     Hypokalemia:  • Improved.   On p.o. dose twice daily with diuretic  • Potassium acceptable     Acute on chronic hypoxic/hypercapnic respiratory failure/end-stage COPD:  • Oxygen dependent-son reports that home oxygen is generally at 3 L  • Elevated bicarb, compensatory likely. Last VBG pH 7.33 on 7/19  • On nasal cannula oxygen. Initially required BiPAP support. • Chest x-ray: Mild pulmonary vascular congestion  • Continue IV diuretic, supportive care     Acute on chronic HFrEF:  • Ejection fraction 20%  • Continue IV diuretic twice daily  • Tyler catheter in place since 7/19.     Anemia:  • History of iron deficiency  • Hemoglobin acceptable, 12.6     End-stage disease:  • Palliative following  • Poor prognosis with multiorgan system failure    DISPOSITION:  Stable from a renal standpoint    SUBJECTIVE / 24H INTERVAL HISTORY:  No acute complaints. Seems anxious to go home. Wife and son in the room. OBJECTIVE:  Current Weight: Weight - Scale: 44.5 kg (98 lb 3.2 oz)  Vitals:    07/21/23 0619 07/21/23 0800 07/21/23 0810 07/21/23 1103   BP: 103/67   103/60   BP Location:       Pulse: 88   103   Resp: 22   20   Temp: 97.9 °F (36.6 °C)   (!) 96.3 °F (35.7 °C)   TempSrc: Axillary   Axillary   SpO2: 90% 99% 94% 95%   Weight:           Intake/Output Summary (Last 24 hours) at 7/21/2023 1239  Last data filed at 7/21/2023 1238  Gross per 24 hour   Intake 1480 ml   Output 3046 ml   Net -1566 ml     General: NAD, comfortably sitting out of bed in the chair  Skin: no rash, skin warm and dry  Eyes: anicteric sclera  ENT: moist mucous membrane  Neck: supple  Chest: Diffuse crackles, expiratory wheezes, dyspnea, at baseline  CVS: s1s2, no murmur, no gallop, no rub. Mildly tachycardic  Abdomen: soft, nontender, nl sounds.   Nondistended  Extremities: +1 dependent edema LE b/l  : no tyler  Neuro: Oriented  Psych: normal affect  Medications:    Current Facility-Administered Medications:   •  acetaminophen (TYLENOL) tablet 650 mg, 650 mg, Oral, Q6H PRN, Brody Anna DO, 650 mg at 07/19/23 2028  •  aspirin chewable tablet 81 mg, 81 mg, Oral, Daily, Brody Anna DO, 81 mg at 07/21/23 0842  •  azithromycin (ZITHROMAX) tablet 500 mg, 500 mg, Oral, Q24H, Andres Briceño MD, 500 mg at 07/20/23 1719  •  budesonide (PULMICORT) inhalation solution 0.5 mg, 0.5 mg, Nebulization, Q12H, Brody Anna DO, 0.5 mg at 07/21/23 7242  •  ergocalciferol (VITAMIN D2) capsule 50,000 Units, 50,000 Units, Oral, Q28 Days, Brody Anna DO, 50,000 Units at 07/19/23 0950  •  guaiFENesin (MUCINEX) 12 hr tablet 1,200 mg, 1,200 mg, Oral, Q12H [address], Brody Anna DO, 1,200 mg at 07/21/23 3010  •  heparin (porcine) subcutaneous injection 5,000 Units, 5,000 Units, Subcutaneous, Q8H [address], Brody Anna DO, 5,000 Units at 07/21/23 0500  •  insulin lispro (HumaLOG) 100 units/mL subcutaneous injection 1-5 Units, 1-5 Units, Subcutaneous, TID AC, 1 Units at 07/21/23 0843 **AND** Fingerstick Glucose (POCT), , , TID AC, Zak Spencer DO  •  insulin lispro (HumaLOG) 100 units/mL subcutaneous injection 1-5 Units, 1-5 Units, Subcutaneous, HS, Zak Spencer DO, 1 Units at 07/20/23 2214  •  ipratropium (ATROVENT) 0.02 % inhalation solution 0.5 mg, 0.5 mg, Nebulization, TID, Brody Anna DO, 0.5 mg at 07/21/23 0809  •  levalbuterol (Roselynn Angelo) inhalation solution 1.25 mg, 1.25 mg, Nebulization, TID, Brody Anna DO, 1.25 mg at 07/21/23 0805  •  potassium chloride (K-DUR,KLOR-CON) CR tablet 20 mEq, 20 mEq, Oral, BID, DELIA Bowens, 20 mEq at 07/21/23 4754  •  predniSONE tablet 60 mg, 60 mg, Oral, Daily, 60 mg at 07/21/23 0842 **FOLLOWED BY** [START ON 7/24/2023] predniSONE tablet 50 mg, 50 mg, Oral, Daily **FOLLOWED BY** [START ON 7/27/2023] predniSONE tablet 40 mg, 40 mg, Oral, Daily **FOLLOWED BY** [START ON 7/30/2023] predniSONE tablet 30 mg, 30 mg, Oral, Daily **FOLLOWED BY** [START ON 8/2/2023] predniSONE tablet 20 mg, 20 mg, Oral, Daily **FOLLOWED BY** [START ON 8/5/2023] predniSONE tablet 10 mg, 10 mg, Oral, Daily, Austin Thurman MD  •  torsemide (DEMADEX) tablet 20 mg, 20 mg, Oral, Daily, DELIA Bowens, 20 mg at 07/21/23 1038    Laboratory Results:  Results from last 7 days   Lab Units 07/21/23  0442 07/20/23  0516 07/19/23  1225 07/19/23  0618 07/19/23  0245   WBC Thousand/uL  --   --   --  10.45* 11.08*   HEMOGLOBIN g/dL  --   --   --  12.6 12.0   HEMATOCRIT %  --   --   --  37.4 36.1*   PLATELETS Thousands/uL  --   --   --  159 204   POTASSIUM mmol/L 3.7 3.5 3.3*  --  3.3*   CHLORIDE mmol/L 80* 73* 70*  --  71*   CO2 mmol/L >45* >45* >45*  --  >45*   BUN mg/dL 21 19 14  --  16   CREATININE mg/dL 0.70 0.78 0.76  --  0.77   CALCIUM mg/dL 9.3 9.0 9.0  --  8.8   MAGNESIUM mg/dL  --  2.3  --   --   --

## 2023-07-21 NOTE — PHYSICAL THERAPY NOTE
PHYSICAL THERAPY NOTE          Patient Name: Meron Delacruz. Today's Date: 7/21/2023 07/21/23 1356   PT Last Visit   PT Visit Date 07/21/23   Note Type   Note Type Treatment   Pain Assessment   Pain Assessment Tool 0-10   Pain Score No Pain   Restrictions/Precautions   Weight Bearing Precautions Per Order No   Other Precautions Cognitive;Multiple lines;O2;Telemetry; Fall Risk  (3Lo2 via NC)   General   Chart Reviewed Yes   Family/Caregiver Present Yes   Cognition   Overall Cognitive Status Impaired   Arousal/Participation Responsive;Arousable; Cooperative   Attention Attends with cues to redirect   Orientation Level Oriented X4   Memory Decreased recall of precautions   Following Commands Follows one step commands with increased time or repetition   Comments Patient pleasant and cooperative. Subjective   Subjective Patient agreeable to therapy   Bed Mobility   Supine to Sit Unable to assess   Sit to Supine Unable to assess   Additional Comments OOB in chair on arrival, and left in chair following activity   Transfers   Sit to Stand   (CGA)   Additional items Assist x 1; Increased time required;Verbal cues   Stand to Sit   (CGA)   Additional items Assist x 1; Increased time required;Verbal cues   Additional Comments RW   Ambulation/Elevation   Gait pattern Forward Flexion;Decreased foot clearance; Short stride; Excessively slow;Knees flexed   Gait Assistance 4  Minimal assist   Additional items Assist x 1;Verbal cues   Assistive Device Rolling walker   Distance 40'x2   Stair Management Assistance Not tested   Ambulation/Elevation Additional Comments O2 managed by RN throughout ; patient resting at 92% on 3Lo2 via NC, upon standing and throughout inital 48' patient maintains 88-92% ; on second 50', patient O2 dips to 80% and is instructed to perform deep breathing.  RN increases O2 requirement to 6L to complete remainder of ambulation back to room. Patient returns to resting O2 on 3L following rest period in sitting. Balance   Static Sitting Fair   Dynamic Sitting Fair -   Static Standing Fair -   Dynamic Standing Poor +   Ambulatory Poor +   Endurance Deficit   Endurance Deficit Yes   Activity Tolerance   Activity Tolerance Patient tolerated treatment well;Patient limited by fatigue   Medical Staff Made Aware RN Joe   Nurse Made Aware RN cleared and assisted with O2 management   Assessment   Prognosis Fair   Problem List Decreased strength; Impaired balance;Decreased endurance;Decreased mobility; Decreased cognition; Impaired judgement;Decreased safety awareness;Pain   Assessment Patient received in bedside chair. Patient agreeable to therapy. Patient performs functional transfers with CGA using RW. Patient performs ambulation with CGA using RW, 50'x2. RN present throughout to observe O2 needs/management. Patient resting at 92% on 3Lo2 via NC, upon standing and throughout inital 48' patient maintains 88-92% ; on second 48', patient O2 dips to 80% and is instructed to perform deep breathing. RN increases O2 requirement to 6L to complete remainder of ambulation back to room. Patient returns to resting O2 on 3L following rest period in sitting. Patient left in bedside chair with all needs met and call bell/personal items within reach. The patient's AM-PAC Basic Mobility Inpatient Short Form Raw Score is 17 showing further need for skilled PT services in order to improve functional mobility, decrease need for assistance, and return to PLOF. A Raw score of greater than or equal to 16 suggests the patient may benefit from discharge to home. PT continues to recommend home with HHPT on d/c. Will continue to follow as able.    Barriers to Discharge Inaccessible home environment;Decreased caregiver support   Goals   Patient Goals to increase endurance   Plan   Treatment/Interventions ADL retraining;Functional transfer training;LE strengthening/ROM; Elevations; Therapeutic exercise; Endurance training;Patient/family training;Cognitive reorientation;Equipment eval/education; Bed mobility;Gait training;Spoke to nursing;Spoke to case management;OT   Progress Slow progress, medical status limitations   PT Frequency 3-5x/wk   Recommendation   PT Discharge Recommendation Home with home health rehabilitation   Equipment Recommended Other (Comment)  (hospital bed; pt sleeps in standard twin-size bed, propped up on multiple pillows due to respiratory issues. )   AM-PAC Basic Mobility Inpatient   Turning in Flat Bed Without Bedrails 4   Lying on Back to Sitting on Edge of Flat Bed Without Bedrails 3   Moving Bed to Chair 3   Standing Up From Chair Using Arms 3   Walk in Room 3   Climb 3-5 Stairs With Railing 1   Basic Mobility Inpatient Raw Score 17   Basic Mobility Standardized Score 39.67   Highest Level Of Mobility   JH-HLM Goal 5: Stand one or more mins   JH-HLM Achieved 7: Walk 25 feet or more   End of Consult   Patient Position at End of Consult Bedside chair; All needs within reach       Prem Robles, PT, DPT

## 2023-07-21 NOTE — PLAN OF CARE
Problem: PHYSICAL THERAPY ADULT  Goal: Performs mobility at highest level of function for planned discharge setting. See evaluation for individualized goals. Description: Treatment/Interventions: Functional transfer training, ADL retraining, LE strengthening/ROM, Elevations, Therapeutic exercise, Endurance training, Cognitive reorientation, Patient/family training, Equipment eval/education, Bed mobility, Gait training, Spoke to nursing, Spoke to case management, OT  Equipment Recommended: Saira Clark (owns)       See flowsheet documentation for full assessment, interventions and recommendations. Outcome: Progressing  Note: Prognosis: Fair  Problem List: Decreased strength, Impaired balance, Decreased endurance, Decreased mobility, Decreased cognition, Impaired judgement, Decreased safety awareness, Pain  Assessment: Patient received in bedside chair. Patient agreeable to therapy. Patient performs functional transfers with CGA using RW. Patient performs ambulation with CGA using RW, 50'x2. RN present throughout to observe O2 needs/management. Patient resting at 92% on 3Lo2 via NC, upon standing and throughout inital 48' patient maintains 88-92% ; on second 48', patient O2 dips to 80% and is instructed to perform deep breathing. RN increases O2 requirement to 6L to complete remainder of ambulation back to room. Patient returns to resting O2 on 3L following rest period in sitting. Patient left in bedside chair with all needs met and call bell/personal items within reach. The patient's AM-PAC Basic Mobility Inpatient Short Form Raw Score is 17 showing further need for skilled PT services in order to improve functional mobility, decrease need for assistance, and return to PLOF. A Raw score of greater than or equal to 16 suggests the patient may benefit from discharge to home. PT continues to recommend home with HHPT on d/c. Will continue to follow as able.   Barriers to Discharge: Inaccessible home environment, Decreased caregiver support     PT Discharge Recommendation: Home with home health rehabilitation    See flowsheet documentation for full assessment.

## 2023-07-21 NOTE — ASSESSMENT & PLAN NOTE
· Family aware of poor prognosis from end-stage COPD and advanced heart failure  · They want to continue with DNR/DNI status and current medications

## 2023-07-21 NOTE — APP STUDENT NOTE
* Acute hypercapnic respiratory failure (HCC)  Assessment & Plan  • Patient presented to the ED with shortness of breath, wheezing, somnolence accompanied with his family   • History of end-stage COPD, acute on chronic combined systolic and diastolic heart failure  • Severe cachexia, diffuse x-ray wheezing on exam, somnolent but arousable at times and then falls back asleep, bilateral pitting lower extremity edema  • Chest x-ray with evidence of enlarged lung fields consistent with underlying COPD, no obvious infiltrate or effusion per my read official read  • Procalcitonin <0.06 therefore this is not an infectious cause, most likely secondary to COPD exacerbation due to increase in emphysematous changes  • CO2 >45, continue to monitor with VBG as needed and pulmnology is following  • Continue PT/OT  • BiPAP as needed at night and for levels that are less than 89% on room air with nasal cannula on highest flow  • Consideration of trilogy ventilator for home through pulmonology     End stage COPD [facility])  Assessment & Plan  • Solu-Medrol 40 mg IV every 8 hours, azithromycin, atrovent/ pulmicort/ xopenex nebs, mucinex  • Pulmonology following the patient   • Transition to prednisone 60 mg daily on 7/21 with taper by 10 mg every 3 days     Acute on chronic systolic congestive heart failure (HCC)  Assessment & Plan        Wt Readings from Last 3 Encounters:   06/26/23 43.8 kg (96 lb 8 oz)   06/16/23 43.1 kg (95 lb)   06/12/23 47.8 kg (105 lb 4.8 oz)      • Bilateral lower extremity edema in setting of respiratory failure; continue to monitor on examination  • Echo in May 2023 with LVEF 20%   • On PTA torsemide  • Start Lasix IV 40 mg twice daily in setting of respiratory failure.  Noted hyponatremia but given patient's severe shortness of breath, will monitor.   • Monitor intake and output  • Daily weight readings     Hyponatremia  Assessment & Plan  • Sodium: 131  • Previously had hyponatremia attributed to SIADH as well as heart failure exacerbation  • Patient strict n.p.o. as well as being IV diuresed. • Monitor sodium with CMP in the AM  • Lasix 40 mg IV 2x daily - tolvaptan was given 7/20 ordered by nephrology     Hypokalemia  Assessment & Plan  • Supplement with IV potassium and magnesium  • Continue to monitor Magnesium and potassium in the AM  • Nephrology monitoring and maintaining electrolytes     Goals of care, counseling/discussion  Assessment & Plan  • See discussion above in respiratory failure section  • Code status deescalated to DNR/DNI  • Palliative consulted for further assistance with goals of care     Pre- Diabetes  Assessment & Plan  • Sliding scale insulin while hospitalized  • Hypoglycemia protocol  • Low carbohydrate, low sugar diet (diabetic diet)  • HbA1C: 5.7 (7/20); continue to monitor POCT glucose     Hyperlipidemia  Assessment & Plan  • Continue Statin     Nonobstructive CAD  Assessment & Plan  •  Continue ASA as well as statin      Prostate Cancer  Assessment & Plan  · Follow up outpatient, monitor PSA levels      Subjective:   Preeti Frances Jr. is a 79 y. o. male who has past medical history significant for end-stage COPD, stage IV heart failure, combined systolic and diastolic heart failure with LVEF 20%, coronary artery disease who presented to [address] [place] [address] ER on the early morning hours of 7/19 with symptoms of respiratory distress as well as increased somnolence and decreased appetite over the last several weeks.  History obtained from speaking with patient, patient's wife and son.  Patient's family numbers were at bedside. Alice Lorenzo report patient has been increasingly short of breath and confused over the last several weeks. Alice Lorenzo report that he is also not eating much anymore and no longer very hungry. Alice Lorenzo also report that he has lost significant amount of weight. Today his son and wife were present. He states that he is "sick of being here" and wants to know his discharge date.  He feels like he is ready to just go home and "get back to his daily life". His family had no questions or concerns. He states that he would like to move around more and walk the hallways with PT. He also denies a bowel movement since he has been in the hospital but denies any abdominal pain, nausea, or vomiting. He is urinating through his tyler catheter well. He denies fever, chills, changes in appetite, chest pain but admits to SOB. Objective:     Vitals:   Temp (24hrs), Av.6 °F (36.4 °C), Min:96.7 °F (35.9 °C), Max:98.1 °F (36.7 °C)    Temp:  [96.7 °F (35.9 °C)-98.1 °F (36.7 °C)] 97.9 °F (36.6 °C)  HR:  [] 88  Resp:  [16-22] 22  BP: ()/(57-78) 103/67  SpO2:  [90 %-98 %] 94 %  Body mass index is 19.18 kg/m². Input and Output Summary (last 24 hours): Intake/Output Summary (Last 24 hours) at 2023 0840  Last data filed at 2023 0802  Gross per 24 hour   Intake 1122 ml   Output 2996 ml   Net -1874 ml       Physical Exam:   Physical Exam  Vitals and nursing note reviewed. Constitutional:       General: He is not in acute distress. Appearance: He is well-developed. He is not ill-appearing or toxic-appearing. HENT:      Head: Normocephalic and atraumatic. Comments: Patient is wearing a nasal cannula  Eyes:      Extraocular Movements: Extraocular movements intact. Conjunctiva/sclera: Conjunctivae normal.   Cardiovascular:      Rate and Rhythm: Normal rate and regular rhythm. Pulses: Normal pulses. Heart sounds: No murmur heard. No friction rub. No gallop. Pulmonary:      Effort: No respiratory distress. Breath sounds: Wheezing and rhonchi present. Abdominal:      General: Bowel sounds are normal. There is distension. Tenderness: There is no abdominal tenderness. There is no guarding. Comments: Lower left quadrant is more full than the other quadrants. No ascites, patrick's signs, or grey saha sign.     No pain with light or deep palpitation Genitourinary:     Comments: Patient has tyler catheter   Urine was yellow with no hematuria  Musculoskeletal:         General: No swelling. Cervical back: Neck supple. Right lower leg: Edema present. Left lower leg: Edema present. Skin:     General: Skin is warm and dry. Capillary Refill: Capillary refill takes less than 2 seconds. Findings: Bruising present. Neurological:      Mental Status: He is alert and oriented to person, place, and time.    Psychiatric:         Mood and Affect: Mood normal.          Additional Data:     Labs:  Results from last 7 days   Lab Units 07/19/23  0618   WBC Thousand/uL 10.45*   HEMOGLOBIN g/dL 12.6   HEMATOCRIT % 37.4   PLATELETS Thousands/uL 159   NEUTROS PCT % 94*   LYMPHS PCT % 3*   MONOS PCT % 3*   EOS PCT % 0     Results from last 7 days   Lab Units 07/21/23  0442 07/20/23  0516   SODIUM mmol/L 131* 126*   POTASSIUM mmol/L 3.7 3.5   CHLORIDE mmol/L 80* 73*   CO2 mmol/L >45* >45*   BUN mg/dL 21 19   CREATININE mg/dL 0.70 0.78   CALCIUM mg/dL 9.3 9.0   ALBUMIN g/dL  --  3.2*   TOTAL BILIRUBIN mg/dL  --  0.75   ALK PHOS U/L  --  57   ALT U/L  --  20   AST U/L  --  31   GLUCOSE RANDOM mg/dL 202* 116     Results from last 7 days   Lab Units 07/19/23  0618   INR  0.78*     Results from last 7 days   Lab Units 07/21/23  0618 07/20/23  2046 07/20/23  1548 07/20/23  1050 07/20/23  0609 07/19/23  2256 07/19/23  1753 07/19/23  1318 07/19/23  0657   POC GLUCOSE mg/dl 165* 160* 144* 288* 132 213* 273* 246* 190*     Results from last 7 days   Lab Units 07/20/23  0516   HEMOGLOBIN A1C % 5.7*     Results from last 7 days   Lab Units 07/20/23  0516 07/19/23  0828   PROCALCITONIN ng/ml 0.06 0.05       Lines/Drains:  Invasive Devices     Peripheral Intravenous Line  Duration           Peripheral IV 07/19/23 Right;Ventral (anterior) Forearm 2 days          Drain  Duration           External Urinary Catheter Small 1 day                  Telemetry:  Telemetry Orders (From admission, onward)             24 Hour Telemetry Monitoring  Continuous x 24 Hours (Telem)        Question:  Reason for 24 Hour Telemetry  Answer:  Acute respiratory failure on BiPAP                   Recent Cultures (last 7 days):         Last 24 Hours Medication List:   Current Facility-Administered Medications   Medication Dose Route Frequency Provider Last Rate   • acetaminophen  650 mg Oral Q6H PRN Wilma Aschoff, DO     • aspirin  81 mg Oral Daily Wilma Aschoff, DO     • azithromycin  500 mg Oral Q24H Amanda Ware MD     • budesonide  0.5 mg Nebulization Q12H Wilma Aschoff, DO     • ergocalciferol  50,000 Units Oral Q28 Days Wilma Aschoff, DO     • guaiFENesin  1,200 mg Oral Q12H [address] Wilma Aschoff, DO     • heparin (porcine)  5,000 Units Subcutaneous Q8H [address] Wilma Aschoff, DO     • insulin lispro  1-5 Units Subcutaneous TID AC Lee Major Starsinic, DO     • insulin lispro  1-5 Units Subcutaneous HS Lee Major Starsinic, DO     • ipratropium  0.5 mg Nebulization TID Wilma Aschoff, DO     • levalbuterol  1.25 mg Nebulization TID Wilma Aschoff, DO     • potassium chloride  20 mEq Oral BID DELIA Bowens     • predniSONE  60 mg Oral Daily Sergio Norton MD      Followed by   • [START ON 7/24/2023] predniSONE  50 mg Oral Daily Sergio Norton MD      Followed by   • [START ON 7/27/2023] predniSONE  40 mg Oral Daily Sergio Norton MD      Followed by   • [START ON 7/30/2023] predniSONE  30 mg Oral Daily Sergio Nroton MD      Followed by   • [START ON 8/2/2023] predniSONE  20 mg Oral Daily Sergio Norton MD      Followed by   • [START ON 8/5/2023] predniSONE  10 mg Oral Daily Sergio Norton MD          Today, Patient Was Seen By: Edouard Isabel    **Please Note: This note may have been constructed using a voice recognition system. **

## 2023-07-21 NOTE — NURSING NOTE
AVS reviewed with PT, wife, and son. IV removed with no signs of bleeding.  PT was wheel chaired out with PCA and son accompanying him, he was on his home O2 machine on 3L

## 2023-07-22 ENCOUNTER — APPOINTMENT (EMERGENCY)
Dept: RADIOLOGY | Facility: HOSPITAL | Age: 68
DRG: 189 | End: 2023-07-22
Payer: COMMERCIAL

## 2023-07-22 ENCOUNTER — HOSPITAL ENCOUNTER (INPATIENT)
Facility: HOSPITAL | Age: 68
LOS: 7 days | Discharge: HOME HEALTH | DRG: 189 | End: 2023-07-29
Attending: EMERGENCY MEDICINE | Admitting: FAMILY MEDICINE
Payer: COMMERCIAL

## 2023-07-22 DIAGNOSIS — R09.02 HYPOXIA: ICD-10-CM

## 2023-07-22 DIAGNOSIS — J44.1 COPD EXACERBATION (HCC): Primary | ICD-10-CM

## 2023-07-22 DIAGNOSIS — E43 SEVERE PROTEIN-CALORIE MALNUTRITION (HCC): ICD-10-CM

## 2023-07-22 DIAGNOSIS — I50.814 RIGHT-SIDED CONGESTIVE HEART FAILURE SECONDARY TO LEFT-SIDED CONGESTIVE HEART FAILURE (HCC): ICD-10-CM

## 2023-07-22 DIAGNOSIS — J96.00 ACUTE RESPIRATORY FAILURE, UNSPECIFIED WHETHER WITH HYPOXIA OR HYPERCAPNIA (HCC): ICD-10-CM

## 2023-07-22 DIAGNOSIS — J44.1 COPD WITH ACUTE EXACERBATION (HCC): ICD-10-CM

## 2023-07-22 DIAGNOSIS — I50.23 ACUTE ON CHRONIC HFREF (HEART FAILURE WITH REDUCED EJECTION FRACTION) (HCC): ICD-10-CM

## 2023-07-22 DIAGNOSIS — E87.1 CHRONIC HYPONATREMIA: ICD-10-CM

## 2023-07-22 DIAGNOSIS — R06.89 HYPERCARBIA: ICD-10-CM

## 2023-07-22 DIAGNOSIS — I50.22 CHRONIC HFREF (HEART FAILURE WITH REDUCED EJECTION FRACTION) (HCC): ICD-10-CM

## 2023-07-22 LAB
2HR DELTA HS TROPONIN: 39 NG/L
4HR DELTA HS TROPONIN: 76 NG/L
ALBUMIN SERPL BCP-MCNC: 3.7 G/DL (ref 3.5–5)
ALP SERPL-CCNC: 65 U/L (ref 46–116)
ALT SERPL W P-5'-P-CCNC: 27 U/L (ref 12–78)
AST SERPL W P-5'-P-CCNC: 34 U/L (ref 5–45)
BASE EX.OXY STD BLDV CALC-SCNC: 72 % (ref 60–80)
BASE EXCESS BLDV CALC-SCNC: 22.3 MMOL/L
BASOPHILS # BLD AUTO: 0 THOUSANDS/ÂΜL (ref 0–0.1)
BASOPHILS NFR BLD AUTO: 0 % (ref 0–1)
BILIRUB SERPL-MCNC: 0.72 MG/DL (ref 0.2–1)
BNP SERPL-MCNC: 677 PG/ML (ref 0–100)
BUN SERPL-MCNC: 22 MG/DL (ref 5–25)
CALCIUM SERPL-MCNC: 9.5 MG/DL (ref 8.3–10.1)
CARDIAC TROPONIN I PNL SERPL HS: 112 NG/L
CARDIAC TROPONIN I PNL SERPL HS: 36 NG/L
CARDIAC TROPONIN I PNL SERPL HS: 75 NG/L
CHLORIDE SERPL-SCNC: 78 MMOL/L (ref 96–108)
CO2 SERPL-SCNC: >45 MMOL/L (ref 21–32)
CREAT SERPL-MCNC: 0.77 MG/DL (ref 0.6–1.3)
D DIMER PPP FEU-MCNC: <0.27 UG/ML FEU
EOSINOPHIL # BLD AUTO: 0 THOUSAND/ÂΜL (ref 0–0.61)
EOSINOPHIL NFR BLD AUTO: 0 % (ref 0–6)
ERYTHROCYTE [DISTWIDTH] IN BLOOD BY AUTOMATED COUNT: 13 % (ref 11.6–15.1)
GFR SERPL CREATININE-BSD FRML MDRD: 94 ML/MIN/1.73SQ M
GLUCOSE SERPL-MCNC: 119 MG/DL (ref 65–140)
GLUCOSE SERPL-MCNC: 191 MG/DL (ref 65–140)
GLUCOSE SERPL-MCNC: 292 MG/DL (ref 65–140)
GLUCOSE SERPL-MCNC: 304 MG/DL (ref 65–140)
HCO3 BLDV-SCNC: 53.5 MMOL/L (ref 24–30)
HCT VFR BLD AUTO: 39.1 % (ref 36.5–49.3)
HGB BLD-MCNC: 12 G/DL (ref 12–17)
IMM GRANULOCYTES # BLD AUTO: 0.07 THOUSAND/UL (ref 0–0.2)
IMM GRANULOCYTES NFR BLD AUTO: 1 % (ref 0–2)
LYMPHOCYTES # BLD AUTO: 0.23 THOUSANDS/ÂΜL (ref 0.6–4.47)
LYMPHOCYTES NFR BLD AUTO: 2 % (ref 14–44)
MCH RBC QN AUTO: 31.8 PG (ref 26.8–34.3)
MCHC RBC AUTO-ENTMCNC: 30.7 G/DL (ref 31.4–37.4)
MCV RBC AUTO: 104 FL (ref 82–98)
MONOCYTES # BLD AUTO: 0.3 THOUSAND/ÂΜL (ref 0.17–1.22)
MONOCYTES NFR BLD AUTO: 3 % (ref 4–12)
NEUTROPHILS # BLD AUTO: 10.94 THOUSANDS/ÂΜL (ref 1.85–7.62)
NEUTS SEG NFR BLD AUTO: 94 % (ref 43–75)
NRBC BLD AUTO-RTO: 0 /100 WBCS
O2 CT BLDV-SCNC: 12.1 ML/DL
PCO2 BLDV: 106.3 MM HG (ref 42–50)
PH BLDV: 7.32 [PH] (ref 7.3–7.4)
PLATELET # BLD AUTO: 182 THOUSANDS/UL (ref 149–390)
PLATELET # BLD AUTO: 216 THOUSANDS/UL (ref 149–390)
PMV BLD AUTO: 9.5 FL (ref 8.9–12.7)
PMV BLD AUTO: 9.8 FL (ref 8.9–12.7)
PO2 BLDV: 43.5 MM HG (ref 35–45)
POTASSIUM SERPL-SCNC: 5.2 MMOL/L (ref 3.5–5.3)
PROT SERPL-MCNC: 6.9 G/DL (ref 6.4–8.4)
RBC # BLD AUTO: 3.77 MILLION/UL (ref 3.88–5.62)
SODIUM SERPL-SCNC: 129 MMOL/L (ref 135–147)
WBC # BLD AUTO: 11.54 THOUSAND/UL (ref 4.31–10.16)

## 2023-07-22 PROCEDURE — 85049 AUTOMATED PLATELET COUNT: CPT | Performed by: FAMILY MEDICINE

## 2023-07-22 PROCEDURE — 5A09357 ASSISTANCE WITH RESPIRATORY VENTILATION, LESS THAN 24 CONSECUTIVE HOURS, CONTINUOUS POSITIVE AIRWAY PRESSURE: ICD-10-PCS | Performed by: FAMILY MEDICINE

## 2023-07-22 PROCEDURE — 82948 REAGENT STRIP/BLOOD GLUCOSE: CPT

## 2023-07-22 PROCEDURE — 5A0935A ASSISTANCE WITH RESPIRATORY VENTILATION, LESS THAN 24 CONSECUTIVE HOURS, HIGH NASAL FLOW/VELOCITY: ICD-10-PCS | Performed by: FAMILY MEDICINE

## 2023-07-22 PROCEDURE — 94760 N-INVAS EAR/PLS OXIMETRY 1: CPT

## 2023-07-22 PROCEDURE — 71045 X-RAY EXAM CHEST 1 VIEW: CPT

## 2023-07-22 PROCEDURE — 83880 ASSAY OF NATRIURETIC PEPTIDE: CPT

## 2023-07-22 PROCEDURE — 94002 VENT MGMT INPAT INIT DAY: CPT

## 2023-07-22 PROCEDURE — 93005 ELECTROCARDIOGRAM TRACING: CPT

## 2023-07-22 PROCEDURE — 94640 AIRWAY INHALATION TREATMENT: CPT

## 2023-07-22 PROCEDURE — 99291 CRITICAL CARE FIRST HOUR: CPT | Performed by: EMERGENCY MEDICINE

## 2023-07-22 PROCEDURE — 82805 BLOOD GASES W/O2 SATURATION: CPT

## 2023-07-22 PROCEDURE — 80053 COMPREHEN METABOLIC PANEL: CPT

## 2023-07-22 PROCEDURE — 99223 1ST HOSP IP/OBS HIGH 75: CPT | Performed by: FAMILY MEDICINE

## 2023-07-22 PROCEDURE — 85025 COMPLETE CBC W/AUTO DIFF WBC: CPT

## 2023-07-22 PROCEDURE — 99285 EMERGENCY DEPT VISIT HI MDM: CPT

## 2023-07-22 PROCEDURE — 85379 FIBRIN DEGRADATION QUANT: CPT

## 2023-07-22 PROCEDURE — 36415 COLL VENOUS BLD VENIPUNCTURE: CPT

## 2023-07-22 PROCEDURE — 84484 ASSAY OF TROPONIN QUANT: CPT

## 2023-07-22 RX ORDER — METHYLPREDNISOLONE SODIUM SUCCINATE 40 MG/ML
40 INJECTION, POWDER, LYOPHILIZED, FOR SOLUTION INTRAMUSCULAR; INTRAVENOUS EVERY 8 HOURS SCHEDULED
Status: DISCONTINUED | OUTPATIENT
Start: 2023-07-23 | End: 2023-07-24

## 2023-07-22 RX ORDER — INSULIN LISPRO 100 [IU]/ML
1-6 INJECTION, SOLUTION INTRAVENOUS; SUBCUTANEOUS EVERY 6 HOURS SCHEDULED
Status: DISCONTINUED | OUTPATIENT
Start: 2023-07-22 | End: 2023-07-23

## 2023-07-22 RX ORDER — FORMOTEROL FUMARATE 20 UG/2ML
20 SOLUTION RESPIRATORY (INHALATION)
Status: DISCONTINUED | OUTPATIENT
Start: 2023-07-22 | End: 2023-07-29 | Stop reason: HOSPADM

## 2023-07-22 RX ORDER — METHYLPREDNISOLONE SOD SUCC 125 MG
1 VIAL (EA) INJECTION ONCE
Status: COMPLETED | OUTPATIENT
Start: 2023-07-22 | End: 2023-07-22

## 2023-07-22 RX ORDER — PREDNISONE 20 MG/1
40 TABLET ORAL DAILY
Status: DISCONTINUED | OUTPATIENT
Start: 2023-07-23 | End: 2023-07-22

## 2023-07-22 RX ORDER — FUROSEMIDE 10 MG/ML
40 INJECTION INTRAMUSCULAR; INTRAVENOUS ONCE
Status: DISCONTINUED | OUTPATIENT
Start: 2023-07-22 | End: 2023-07-22

## 2023-07-22 RX ORDER — ONDANSETRON 2 MG/ML
4 INJECTION INTRAMUSCULAR; INTRAVENOUS EVERY 6 HOURS PRN
Status: DISCONTINUED | OUTPATIENT
Start: 2023-07-22 | End: 2023-07-29 | Stop reason: HOSPADM

## 2023-07-22 RX ORDER — ALBUTEROL SULFATE 2.5 MG/3ML
2 SOLUTION RESPIRATORY (INHALATION) ONCE
Status: COMPLETED | OUTPATIENT
Start: 2023-07-22 | End: 2023-07-22

## 2023-07-22 RX ORDER — HEPARIN SODIUM 5000 [USP'U]/ML
5000 INJECTION, SOLUTION INTRAVENOUS; SUBCUTANEOUS EVERY 8 HOURS SCHEDULED
Status: DISCONTINUED | OUTPATIENT
Start: 2023-07-22 | End: 2023-07-29 | Stop reason: HOSPADM

## 2023-07-22 RX ORDER — LEVALBUTEROL INHALATION SOLUTION 1.25 MG/3ML
1.25 SOLUTION RESPIRATORY (INHALATION)
Status: DISCONTINUED | OUTPATIENT
Start: 2023-07-22 | End: 2023-07-29 | Stop reason: HOSPADM

## 2023-07-22 RX ORDER — FUROSEMIDE 10 MG/ML
40 INJECTION INTRAMUSCULAR; INTRAVENOUS
Status: DISCONTINUED | OUTPATIENT
Start: 2023-07-23 | End: 2023-07-24

## 2023-07-22 RX ORDER — FUROSEMIDE 40 MG/1
40 TABLET ORAL 2 TIMES DAILY
Status: DISCONTINUED | OUTPATIENT
Start: 2023-07-22 | End: 2023-07-22

## 2023-07-22 RX ORDER — IPRATROPIUM BROMIDE AND ALBUTEROL SULFATE 2.5; .5 MG/3ML; MG/3ML
3 SOLUTION RESPIRATORY (INHALATION) 4 TIMES DAILY
Status: DISCONTINUED | OUTPATIENT
Start: 2023-07-22 | End: 2023-07-22

## 2023-07-22 RX ORDER — IPRATROPIUM BROMIDE AND ALBUTEROL SULFATE .5; 3 MG/3ML; MG/3ML
1 SOLUTION RESPIRATORY (INHALATION) ONCE
Status: COMPLETED | OUTPATIENT
Start: 2023-07-22 | End: 2023-07-22

## 2023-07-22 RX ORDER — BUDESONIDE 0.5 MG/2ML
0.5 INHALANT ORAL
Status: DISCONTINUED | OUTPATIENT
Start: 2023-07-22 | End: 2023-07-29 | Stop reason: HOSPADM

## 2023-07-22 RX ADMIN — IPRATROPIUM BROMIDE 0.5 MG: 0.5 SOLUTION RESPIRATORY (INHALATION) at 20:11

## 2023-07-22 RX ADMIN — FORMOTEROL FUMARATE DIHYDRATE 20 MCG: 20 SOLUTION RESPIRATORY (INHALATION) at 20:11

## 2023-07-22 RX ADMIN — INSULIN LISPRO 2 UNITS: 100 INJECTION, SOLUTION INTRAVENOUS; SUBCUTANEOUS at 19:53

## 2023-07-22 RX ADMIN — BUDESONIDE 0.5 MG: 0.5 INHALANT ORAL at 20:11

## 2023-07-22 RX ADMIN — LEVALBUTEROL HYDROCHLORIDE 1.25 MG: 1.25 SOLUTION RESPIRATORY (INHALATION) at 20:11

## 2023-07-22 NOTE — ED PROVIDER NOTES
History  Chief Complaint   Patient presents with   • Respiratory Distress     Pt arrived via EMS for respiratory distress. Per EMS RA O2 sats 60s. Pt arrived on bipap. B/L LE swelling. Patient is a 72-year-old male with past medical history of COPD presenting for evaluation of shortness of breath. Patient was recently admitted at [facility] for COPD exacerbation, and was discharged yesterday. Patient was feeling okay upon discharge and this morning, and suddenly became short of breath while he was sitting at his kitchen table. Patient on CPAP at home, but the machine has been malfunctioning. Pt also endorses BL feet and leg swelling which is new today. Pt states that his legs have swollen before but have never turned blue like they are today. Pt denies hemoptysis or coughing up sputum. Denies fever, chest pain, or any other symptoms at this time. Per EMS, patient O2 sat in the 60s on arrival, improved to 90s with bipap en route. Pt received solumedrol and duoneb prior to arrival.      History provided by:  Patient  History limited by:  Acuity of condition  Shortness of Breath  Severity:  Severe  Onset quality:  Sudden  Timing:  Constant  Progression:  Partially resolved  Chronicity:  Recurrent  Context: not activity, not emotional upset, not known allergens, not occupational exposure and not URI    Relieved by:   Inhaler and oxygen  Worsened by:  Nothing  Ineffective treatments:  None tried  Associated symptoms: cough and wheezing    Associated symptoms: no abdominal pain, no chest pain, no diaphoresis, no fever, no headaches, no hemoptysis, no sputum production, no syncope and no vomiting    Cough:     Cough characteristics:  Dry    Severity:  Moderate    Chronicity:  Chronic  Wheezing:     Severity:  Moderate    Onset quality:  Sudden    Timing:  Constant    Progression:  Partially resolved    Chronicity:  Recurrent  Risk factors: prolonged immobilization    Risk factors: no recent alcohol use, no family hx of DVT and no recent surgery        Prior to Admission Medications   Prescriptions Last Dose Informant Patient Reported? Taking?    Diclofenac Sodium (VOLTAREN) 1 %  Spouse/Significant Other, Self Yes No   Sig: APPLY 2 GRAMS 4 TIMES A DAY   Melatonin 5 MG TABS  Spouse/Significant Other, Self Yes No   Sig: Take 1 tablet by mouth daily at bedtime   acetaminophen (TYLENOL) 325 mg tablet  Spouse/Significant Other, Self No No   Sig: Take 3 tablets (975 mg total) by mouth every 8 (eight) hours as needed for mild pain or moderate pain   albuterol (PROVENTIL HFA,VENTOLIN HFA) 90 mcg/act inhaler  Self, Spouse/Significant Other No No   Sig: Inhale 2 puffs every 6 (six) hours as needed for wheezing   aspirin 81 mg chewable tablet  Self, Spouse/Significant Other No No   Sig: Chew 1 tablet (81 mg total) daily   azithromycin (ZITHROMAX) 500 MG tablet   No No   Sig: Take 1 tablet (500 mg total) by mouth every 24 hours for 1 dose   budesonide (PULMICORT) 0.5 mg/2 mL nebulizer solution  Self, Spouse/Significant Other No No   Sig: TAKE 2 ML (0.5 MG TOTAL) BY NEBULIZATION TWICE A DAY RINSE MOUTH AFTER USE   chlorhexidine (PERIDEX) 0.12 % solution  Self, Spouse/Significant Other No No   Sig: Apply 15 mL to the mouth or throat every 12 (twelve) hours   cyanocobalamin (VITAMIN B-12) 1000 MCG tablet  Self, Spouse/Significant Other No No   Sig: Take 1 tablet (1,000 mcg total) by mouth daily   docusate sodium (COLACE) 100 mg capsule   No No   Sig: Take 1 capsule (100 mg total) by mouth 2 (two) times a day   ergocalciferol (VITAMIN D2) 50,000 units  Self, Spouse/Significant Other No No   Sig: TAKE 1 CAPSULE (50,000 UNITS TOTAL) BY MOUTH EVERY 28 DAYS   famotidine (PEPCID) 20 mg tablet   No No   Sig: Take 1 tablet (20 mg total) by mouth 2 (two) times a day for 14 days   formoterol (PERFOROMIST) 20 MCG/2ML nebulizer solution  Self, Spouse/Significant Other Yes No   Sig: TAKE 2 ML (20 MCG TOTAL) BY NEBULIZATION 2 (TWO) TIMES A DAY guaiFENesin (MUCINEX) 600 mg 12 hr tablet  Self, Spouse/Significant Other No No   Sig: Take 2 tablets (1,200 mg total) by mouth every 12 (twelve) hours   ipratropium-albuterol (DUO-NEB) 0.5-2.5 mg/3 mL nebulizer solution   Yes No   Sig: Take 3 mL by nebulization 4 (four) times a day   potassium chloride (K-DUR,KLOR-CON) 20 mEq tablet   No No   Sig: Take 1 tablet (20 mEq total) by mouth 2 (two) times a day   predniSONE 10 mg tablet   No No   Sig: Take 6 tablets (60 mg total) by mouth daily for 2 days Then 5 tablets (50 mg total) by mouth daily for 3 days, then 4 tablets (40 mg total) by mouth daily for 3 days, then 3 tablets (30 mg total) by mouth daily for 3 days, then 2 tablets (20 mg total) by mouth daily for 3 days, then 1 tablet (10 mg total) by mouth daily for 3 days then continue with Prednisone 5 mg daily Do not start before 2023. predniSONE 5 mg tablet   No No   Sig: Take 1 tablet (5 mg total) by mouth daily Do not start before 2023.    rosuvastatin (CRESTOR) 10 MG tablet  Spouse/Significant Other, Self No No   Sig: Take 1 tablet (10 mg total) by mouth daily   senna (SENOKOT) 8.6 mg   No No   Sig: Take 1 tablet (8.6 mg total) by mouth daily at bedtime   sodium chloride (OCEAN) 0.65 % nasal spray  Self, Spouse/Significant Other No No   Si spray into each nostril every hour as needed for congestion   tamsulosin (FLOMAX) 0.4 mg  Spouse/Significant Other, Self No No   Sig: Take 1 capsule (0.4 mg total) by mouth daily with dinner   torsemide (DEMADEX) 10 mg tablet   No No   Sig: Take 3 tablets (30 mg total) by mouth daily      Facility-Administered Medications: None       Past Medical History:   Diagnosis Date   • Acute metabolic encephalopathy    • Arthritis    • Bladder mass    • Cardiomyopathy [facility])    • Chest pain    • COPD (chronic obstructive pulmonary disease) (HCC)    • COVID-19 virus infection 2023   • CPAP (continuous positive airway pressure) dependence    • Emphysema of lung ([address])    • Hypoxia     nocturnal   • Left bundle branch block    • Multiple pulmonary nodules     last assessed: 10/12/16   • Pneumonia    • Sleep apnea    • Sleep apnea, obstructive    • Smoker    • Weight loss 2019       Past Surgical History:   Procedure Laterality Date   • COLONOSCOPY     • CYSTOSCOPY     • CYSTOSCOPY  2021   • WA BLADDER INSTILLATION ANTICARCINOGENIC AGENT N/A 1/3/2020    Procedure: INSTILLATION MITOMYCIN;  Surgeon: Joanna Dangelo MD;  [facility];  Service: Urology   • WA CYSTO W/REMOVAL OF LESIONS SMALL N/A 1/3/2020    Procedure: TRANSURETHRAL RESECTION OF BLADDER TUMOR (TURBT); Surgeon: Joanna Dangelo MD;  [facility];  Service: Urology   • WA CYSTOURETHROSCOPY WITH BIOPSY N/A 2021    Procedure: Levon Kasper;  Surgeon: Joanna Dangelo MD;  [facility];  Service: Urology   • WA TRURL ELECTROSURG RESCJ PROSTATE BLEED COMPLETE N/A 2021    Procedure: TRANSURETHRAL RESECTION OF PROSTATE (TURP) BLADDER BIOPSY, FULGURATION;  Surgeon: Joanna Dangelo MD;  [facility];  Service: Urology       Family History   Problem Relation Age of Onset   • Diabetes Mother      I have reviewed and agree with the history as documented.     E-Cigarette/Vaping   • E-Cigarette Use Never User      E-Cigarette/Vaping Substances   • Nicotine No    • THC No    • CBD No    • Flavoring No    • Other No    • Unknown No      Social History     Tobacco Use   • Smoking status: Former     Packs/day: 2.50     Years: 42.00     Total pack years: 105.00     Types: Cigarettes     Start date: 5     Quit date:      Years since quittin.5   • Smokeless tobacco: Former   • Tobacco comments:     1 ppd for 37 years, 2010 down to 5 cigs a day, is around second hand smoke   Vaping Use   • Vaping Use: Never used   Substance Use Topics   • Alcohol use: Not Currently     Comment: rarely   • Drug use: No        Review of Systems Constitutional: Negative for chills, diaphoresis, fatigue and fever. HENT: Negative for congestion. Respiratory: Positive for cough, shortness of breath and wheezing. Negative for hemoptysis, sputum production and chest tightness. Cardiovascular: Positive for leg swelling. Negative for chest pain and syncope. Gastrointestinal: Negative for abdominal pain, diarrhea, nausea and vomiting. Genitourinary: Negative for difficulty urinating. Skin: Negative for color change. Neurological: Negative for dizziness, syncope, weakness, numbness and headaches. Physical Exam  ED Triage Vitals [07/22/23 1449]   Temperature Pulse Respirations Blood Pressure SpO2   97.9 °F (36.6 °C) (!) 110 (!) 25 116/77 98 %      Temp Source Heart Rate Source Patient Position - Orthostatic VS BP Location FiO2 (%)   Rectal Monitor -- Right arm --      Pain Score       No Pain             Orthostatic Vital Signs  Vitals:    07/22/23 1630 07/22/23 1645 07/22/23 1700 07/22/23 1745   BP: 93/57 92/58 93/59 137/81   Pulse: 78 75 75 87       Physical Exam  Vitals and nursing note reviewed. Constitutional:       General: He is in acute distress. Appearance: Normal appearance. He is ill-appearing. He is not toxic-appearing or diaphoretic. HENT:      Head: Normocephalic and atraumatic. Eyes:      General: No scleral icterus. Extraocular Movements: Extraocular movements intact. Cardiovascular:      Rate and Rhythm: Normal rate and regular rhythm. Pulses: Normal pulses. Heart sounds: Normal heart sounds. No murmur heard. Pulmonary:      Effort: Respiratory distress present. Breath sounds: Wheezing and rales present. Abdominal:      General: Abdomen is flat. There is no distension. Palpations: Abdomen is soft. Tenderness: There is no abdominal tenderness. Musculoskeletal:         General: Normal range of motion. Cervical back: Normal range of motion.       Right lower leg: Edema (4+ pitting edema to knees) present. Left lower leg: Edema (4+ pitting edema to knees) present. Skin:     General: Skin is warm. Capillary Refill: Capillary refill takes less than 2 seconds. Neurological:      General: No focal deficit present. Mental Status: He is alert. Cranial Nerves: No cranial nerve deficit. Sensory: No sensory deficit. Motor: No weakness.       Gait: Gait normal.   Psychiatric:         Mood and Affect: Mood normal.         Behavior: Behavior normal.         ED Medications  Medications   levalbuterol (XOPENEX) inhalation solution 1.25 mg (has no administration in time range)   ipratropium (ATROVENT) 0.02 % inhalation solution 0.5 mg (has no administration in time range)   furosemide (LASIX) injection 40 mg (40 mg Intravenous Not Given 7/22/23 1736)   methylPREDNISolone sodium succinate (FOR EMS ONLY) (Solu-MEDROL) 125 MG injection 125 mg (0 mg Does not apply Given to EMS 7/22/23 1500)   albuterol (FOR EMS ONLY) (2.5 mg/3 mL) 0.083 % inhalation solution 5 mg (0 mg Does not apply Given to EMS 7/22/23 1632)   ipratropium-albuterol (FOR EMS ONLY) (DUO-NEB) 0.5-2.5 mg/3 mL inhalation solution 3 mL (0 mL Does not apply Given to EMS 7/22/23 1632)       Diagnostic Studies  Results Reviewed     Procedure Component Value Units Date/Time    HS Troponin I 2hr [[id number]] Collected: 07/22/23 1751    Lab Status: No result Specimen: Blood from Arm, Left     B-Type Natriuretic Peptide(BNP) [[id number]]  (Abnormal) Collected: 07/22/23 1539    Lab Status: Final result Specimen: Blood from Arm, Left Updated: 07/22/23 1658      pg/mL     Comprehensive metabolic panel [[id number]]  (Abnormal) Collected: 07/22/23 1539    Lab Status: Final result Specimen: Blood from Arm, Left Updated: 07/22/23 1642     Sodium 129 mmol/L      Potassium 5.2 mmol/L      Chloride 78 mmol/L      CO2 >45 mmol/L      ANION GAP --     BUN 22 mg/dL      Creatinine 0.77 mg/dL      Glucose 292 mg/dL Calcium 9.5 mg/dL      AST 34 U/L      ALT 27 U/L      Alkaline Phosphatase 65 U/L      Total Protein 6.9 g/dL      Albumin 3.7 g/dL      Total Bilirubin 0.72 mg/dL      eGFR 94 ml/min/1.73sq m     Narrative:      National Kidney Disease Foundation guidelines for Chronic Kidney Disease (CKD):   •  Stage 1 with normal or high GFR (GFR > 90 mL/min/1.73 square meters)  •  Stage 2 Mild CKD (GFR = 60-89 mL/min/1.73 square meters)  •  Stage 3A Moderate CKD (GFR = 45-59 mL/min/1.73 square meters)  •  Stage 3B Moderate CKD (GFR = 30-44 mL/min/1.73 square meters)  •  Stage 4 Severe CKD (GFR = 15-29 mL/min/1.73 square meters)  •  Stage 5 End Stage CKD (GFR <15 mL/min/1.73 square meters)  Note: GFR calculation is accurate only with a steady state creatinine    Blood gas, venous [[id number]]  (Abnormal) Collected: 07/22/23 1633    Lab Status: Final result Specimen: Blood from Arm, Left Updated: 07/22/23 1641     pH, Oscar 7.320     pCO2, Oscar 106.3 mm Hg      pO2, Oscar 43.5 mm Hg      HCO3, Oscar 53.5 mmol/L      Base Excess, Oscar 22.3 mmol/L      O2 Content, Oscar 12.1 ml/dL      O2 HGB, VENOUS 72.0 %     CBC and differential [[id number]]  (Abnormal) Collected: 07/22/23 1539    Lab Status: Final result Specimen: Blood from Arm, Left Updated: 07/22/23 1640     WBC 11.54 Thousand/uL      RBC 3.77 Million/uL      Hemoglobin 12.0 g/dL      Hematocrit 39.1 %       fL      MCH 31.8 pg      MCHC 30.7 g/dL      RDW 13.0 %      MPV 9.8 fL      Platelets 490 Thousands/uL      nRBC 0 /100 WBCs      Neutrophils Relative 94 %      Immat GRANS % 1 %      Lymphocytes Relative 2 %      Monocytes Relative 3 %      Eosinophils Relative 0 %      Basophils Relative 0 %      Neutrophils Absolute 10.94 Thousands/µL      Immature Grans Absolute 0.07 Thousand/uL      Lymphocytes Absolute 0.23 Thousands/µL      Monocytes Absolute 0.30 Thousand/µL      Eosinophils Absolute 0.00 Thousand/µL      Basophils Absolute 0.00 Thousands/µL     Narrative:       This is an appended report. These results have been appended to a previously verified report. Fingerstick Glucose (POCT) [[id number]]  (Abnormal) Collected: 07/22/23 1626    Lab Status: Final result Updated: 07/22/23 1635     POC Glucose 304 mg/dl     HS Troponin I 4hr [[id number]]     Lab Status: No result Specimen: Blood     HS Troponin 0hr (reflex protocol) [[id number]]  (Normal) Collected: 07/22/23 1539    Lab Status: Final result Specimen: Blood from Arm, Left Updated: 07/22/23 1627     hs TnI 0hr 36 ng/L     D-dimer, quantitative [[id number]]  (Normal) Collected: 07/22/23 1539    Lab Status: Final result Specimen: Blood from Arm, Left Updated: 07/22/23 1613     D-Dimer, Quant <0.27 ug/ml FEU     Narrative: In the evaluation for possible pulmonary embolism, in the appropriate (Well's Score of 4 or less) patient, the age adjusted d-dimer cutoff for this patient can be calculated as:    Age x 0.01 (in ug/mL) for Age-adjusted D-dimer exclusion threshold for a patient over 50 years.                  XR chest portable   ED Interpretation by Britton Doshi MD (07/22 1644)   Trachea midline   Lung fields significant for prominent pulmonary markings with cephalization  No evidence of pneumothorax  Cardiac silhouette enlarged  Diaphragm and costophrenic angles easily visualized bilaterally            Procedures  Procedures   EKG done at 1459 interpreted by me   rate 108  rhythm sinus tachycardia  axis normal  QRS complexes are wide  Intervals are normal  ST segments showing no elevation or depression  Compared to ECG from 2 days ago, no changes      ED Course  ED Course as of 07/22/23 1753   Sat Jul 22, 2023   1446 Patient seen and evaluated   1614 D-dimer, quantitative  Negative   1615 Critical CO2 >>45, VBG ordered   1630 CBC and differential(!)  Leukocytosis similar to prior labs   1644 Comprehensive metabolic panel(!!)  Na 146, likely do to volume status   1647 HS Troponin 0hr (reflex protocol)  36   1648 CXR significant for increased vascular markings and cephalization   1745 IPAP increased from 12-->14 given increased somnolence   1748 Blood gas, venous(!)  CO2 106 noted   1748 Lasix ordered due to fluid overload, but held due to soft BP   1749 Patient admitted to [facility]' Criteria for PE    Flowsheet Row Most Recent Value   Natasha Criteria for PE    Clinical signs and symptoms of DVT 0 Filed at: 07/22/2023 1526   PE is primary diagnosis or equally likely 3 Filed at: 07/22/2023 1526   HR >100 1.5 Filed at: 07/22/2023 1526   Immobilization at least 3 days or Surgery in the previous 4 weeks 1.5 Filed at: 07/22/2023 1526   Previous, objectively diagnosed PE or DVT 0 Filed at: 07/22/2023 1526   Hemoptysis 0 Filed at: 07/22/2023 1526   Malignancy with treatment within 6 months or palliative 0 Filed at: 07/22/2023 1526   Jl' Criteria Total 6 Filed at: 07/22/2023 [address]  Patient is a 60-year-old male past medical history of COPD coming in for evaluation of shortness of breath. Given the patient's history, and improved with oxygen this is likely due to progression of COPD. Given tachycardia, and sudden onset shortness of breath, as well as recent hospitalization, PE is on the differential-will evaluate with a D-dimer. If D-dimer elevated, will send for CT scan. Also on the differential is a CHF exacerbation, the patient's amount of leg swelling, and dyspnea-will evaluate for pulmonary edema with chest x-ray though no B-lines seen POCUS. More likely chronic COPD is causing right heart failure, which would not be associated with pulmonary edema. Exacerbation could be result of ischemia, will evaluate with EKG and troponin. DDx: COPD exacerbation, PE, CHF exacerbation, ischemia    Update: D-dimer negative, no need for CT scan at this time.   Patient with softer pressures at this time, systolic in the 28R, reevaluated and patient breathing with less work, but is less responsive at this time. Blood glucose 304 hypoglycemia as cause of altered mental status. Critical value of CO2 greater than 45, VBG ordered. Edema noted on chest xray, will hold lasix at this time due to BP systolic <743. Patient appropriate for SD2, admitted to [address] for COPD exacerbation. COPD exacerbation ([address]): acute illness or injury  Hypoxia: acute illness or injury  Amount and/or Complexity of Data Reviewed  Labs: ordered. Decision-making details documented in ED Course. Radiology: ordered and independent interpretation performed. Risk  Prescription drug management. Decision regarding hospitalization. Disposition  Final diagnoses:   COPD exacerbation ([address])   Hypoxia   Right-sided congestive heart failure secondary to left-sided congestive heart failure ([address])     Time reflects when diagnosis was documented in both MDM as applicable and the Disposition within this note     Time User Action Codes Description Comment    7/22/2023  5:45 PM Aicha Civatte Add [J44.1] COPD exacerbation ([address])     7/22/2023  5:45 PM Aicha Civatte Add [R09.02] Hypoxia     7/22/2023  5:45 PM Aicha Civatte Add [I50.814] Right-sided congestive heart failure secondary to left-sided congestive heart failure [facility])       ED Disposition     ED Disposition   Admit    Condition   Stable    Date/Time   Sat Jul 22, 2023  5:46 PM    Comment   Case was discussed with Dr. [address] and the patient's admission status was agreed to be Admission Status: inpatient status to the service of Dr. [address] Follow-up Information    None         Patient's Medications   Discharge Prescriptions    No medications on file     No discharge procedures on file. PDMP Review       Value Time User    PDMP Reviewed  Yes 7/14/2023 11:06 AM Carmen Ann PA-C           ED Provider  Attending physically available and evaluated Loren Carvalho. . I managed the patient along with the ED Attending.     Electronically Signed by         Melissa Jung MD  07/22/23 4384

## 2023-07-22 NOTE — DISCHARGE SUMMARY
Discharging Physician / Practitioner: Elma Mckeon MD  PCP: Ronit Das DO  Admission Date:   Admission Orders (From admission, onward)     Ordered        07/19/23 0419  INPATIENT ADMISSION  Once                      Discharge Date: 07/21/23     Principal discharge diagnoses:  1. Acute on chronic heart failure with reduced ejection fraction  2. End-stage COPD with acute exacerbation  3. Acute on chronic hyponatremia  4. Acute on chronic respiratory failure with hypoxia and chronic hypercapnic respiratory failure  5. Metabolic encephalopathy -resolved    Secondary diagnoses  1. KEVIN  2. Prediabetes  3. Nonobstructive coronary artery disease  4. History of prostate cancer    Consultations During Hospital Stay:  Heart failure  Pulmonology  Nephrology  Palliative care    Procedures Performed:   Chest x-ray - Emphysematous changes. No focal airspace consolidation. Mild pulmonary vascular congestion. Hospital Course:   Isabell Ba is a 79 y.o. male patient who originally presented to the hospital on 7/19/2023 due to shortness of breath and increased somnolence over a few weeks with a fall from bed on the day of presentation. He was noted to be in acute on chronic systolic heart failure and COPD with acute exacerbation. He was begun on intravenous Lasix, Solu-Medrol and nebulizer treatments with improvement. He has end-stage COPD and stage IV CSF and follows with palliative care as an outpatient. He and his family are aware of his poor prognosis but did not wish to de-escalate care at present. He was placed on BiPAP initially which was continued at night later. Pulmonology have initiated the process for him to get a trilogy ventilator which will be delivered to his house on 7/25/2023. He was cleared for discharge by cardiology and pulmonology. His dose of torsemide was increased from 20 mg to 30 mg daily.   He was discharged on a prednisone taper beginning at 60 mg with recommendation to taper by 10 mg every 3 days till he reaches his home prednisone dose of 5 mg. His oxygen requirements had increased to 6 L from his baseline of 3 L. At the time of discharge his O2 requirements had improved to 3 L he also had acute on chronic hyponatremia and received a dose of Samsca. Sodium should be monitored as an outpatient. Condition at Discharge: guarded    Discharge Day Visit / Exam:   Subjective:    Vitals: Blood Pressure: 103/60 (07/21/23 1103)  Pulse: 103 (07/21/23 1103)  Temperature: (!) 96.3 °F (35.7 °C) (07/21/23 1103)  Temp Source: Axillary (07/21/23 1103)  Respirations: 20 (07/21/23 1103)  Weight - Scale: 44.5 kg (98 lb 3.2 oz) (07/21/23 0500)  SpO2: 95 % (07/21/23 1103)  Exam:   Physical Exam  Vitals reviewed. Constitutional:       Appearance: He is ill-appearing. HENT:      Head: Normocephalic. Nose: Nose normal.      Mouth/Throat:      Mouth: Mucous membranes are moist.   Eyes:      Extraocular Movements: Extraocular movements intact. Cardiovascular:      Rate and Rhythm: Normal rate and regular rhythm. Pulmonary:      Effort: Pulmonary effort is normal.      Comments: Decreased breath sounds bilaterally  Abdominal:      General: Bowel sounds are normal. There is no distension. Palpations: Abdomen is soft. Tenderness: There is no abdominal tenderness. Musculoskeletal:         General: No swelling. Cervical back: Neck supple. Skin:     General: Skin is warm and dry. Neurological:      General: No focal deficit present. Mental Status: He is oriented to person, place, and time. Psychiatric:         Mood and Affect: Mood normal.          Discussion with Family: Updated  (wife and son) at bedside. Discharge instructions/Information to patient and family:   See after visit summary for information provided to patient and family.       Provisions for Follow-Up Care:  See after visit summary for information related to follow-up care and any pertinent home health orders. Disposition:   Home    Planned Readmission: No     Discharge Statement:  I spent 40 minutes discharging the patient. This time was spent on the day of discharge. I had direct contact with the patient on the day of discharge. Greater than 50% of the total time was spent examining patient, answering all patient questions, arranging and discussing plan of care with patient as well as directly providing post-discharge instructions. Additional time then spent on discharge activities. Discharge Medications:  See after visit summary for reconciled discharge medications provided to patient and/or family.       **Please Note: This note may have been constructed using a voice recognition system**

## 2023-07-22 NOTE — H&P
[address] [facility]  H&P  Name: Catina Perez. 79 y.o. 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 · Per EMS, patient was hypoxic at 60%, placed on BiPAP on route, and was given Solu-Medrol and DuoNeb on route. · On my exam, patient is cachectic, on continuous BiPAP, somnolent, respiratory distress. Saturating at 98% on BiPAP. · Labs reveal CO2 >45 on BMP, VBG shows PCO2 106. · Discussed with ED attending, hadley for level 2 stepdown on DNR/DNI patient  · Continue IV Solu-Medrol 40 mg every 8 hours, Xopenex/Atrovent 3 times daily, Pulmicort nebulizer. · Low utility for antibiotics as patient completed azithromycin yesterday  · Start IV Lasix 40 mg twice daily  · Hold all oral meds while on BiPAP  · Prognosis poor  · Patient is currently DNR/DNI. Discussed with wife that if patient were to decline, will need comfort care. Currently wife is not ready for comfort care/hospice care. · Placed consult for pulmonology, discussed with on-call Dr. Carbone Given- continue current treatment. No other treatment recommended at this point.      Acute on chronic HFrEF (heart failure with reduced ejection fraction) (HCC)  Assessment & Plan  Wt Readings from Last 3 Encounters:   07/22/23 42.9 kg (94 lb 9.2 oz)   07/21/23 44.5 kg (98 lb 3.2 oz)   06/26/23 43.8 kg (96 lb 8 oz)     Last echocardiogram from 5/2023 shows LVEF 20%  Today proBNP mildly elevated  Hold home torsemide, start IV Lasix 40 mg twice daily        End-stage COPD with acute exacerbation [facility])  Assessment & Plan  Plan as above    Chronic hypercapnia/KEVIN  Assessment & Plan  Currently on continuous BiPAP    Severe protein-calorie malnutrition ([address])  Assessment & Plan  Malnutrition Findings:                                 BMI Findings: Body mass index is 18.47 kg/m². Hyperglycemia  Assessment & Plan  Last blood glucose 304   Will place on SSI   Does not have formal diagnosis of diabetes    Hyponatremia  Assessment & Plan  Chronic hyponatremia noted, sodium 129 which is at baseline       VTE Prophylaxis: Heparin  / sequential compression device   Code Status: DNR/DNI   Discussion with family: wife and son at bedside    Anticipated Length of Stay:  Patient will be admitted on an Inpatient basis with an anticipated length of stay of  > 2 midnights. Justification for Hospital Stay: COPD     Total Time for Visit, including Counseling / Coordination of Care: 60 minutes. Greater than 50% of this total time spent on direct patient counseling and coordination of care. Chief Complaint:   Acute respiratory failure, dyspnea    History of Present Illness:    Maria Del Carmen Pierce is a 79 y.o. male discharged on 3L oxygen yesterday, PMH of end-stage COPD, chronic respiratory failure with hypoxia and hypercapnia, KEVIN, combined CHF with LVEF 20%, return to hospital today due to sudden onset of dyspnea while sitting in the kitchen this morning. Wife reports there has been bilateral leg edema associated with it as well. Patient has been afebrile, no cough or hemoptysis reported. Per EMS, patient was hypoxic in 60s, and was placed on BiPAP on route, received Solu-Medrol and DuoNeb prior to arrival.  In ER, BMP reveals elevated CO2, D-dimer normal, elevated proBNP. VBG reveals patient is hypercapnic with PCO2 106.3. On my exam, patient is somnolent, on continuous BiPAP, severely ill appearing and in respiratory distress.   On BiPAP, saturating at 98%.  Wakes up with sternal rub however quickly falls back asleep, unable to answer any question. Review of Systems:    Review of Systems   Unable to perform ROS: Severe respiratory distress       Past Medical and Surgical History:     Past Medical History:   Diagnosis Date   • Acute metabolic encephalopathy 9/42/7292   • Arthritis    • Bladder mass    • Cardiomyopathy [facility])    • Chest pain    • COPD (chronic obstructive pulmonary disease) ([facility])    • COVID-19 virus infection 2/23/2023   • CPAP (continuous positive airway pressure) dependence    • Emphysema of lung (HCC)    • Hypoxia     nocturnal   • Left bundle branch block    • Multiple pulmonary nodules     last assessed: 10/12/16   • Pneumonia    • Sleep apnea    • Sleep apnea, obstructive    • Smoker    • Weight loss 8/29/2019       Past Surgical History:   Procedure Laterality Date   • COLONOSCOPY     • CYSTOSCOPY     • CYSTOSCOPY  02/17/2021   • NV BLADDER INSTILLATION ANTICARCINOGENIC AGENT N/A 1/3/2020    Procedure: INSTILLATION MITOMYCIN;  Surgeon: Andres Fernando MD;  Location: [facility];  Service: Urology   • NV CYSTO W/REMOVAL OF LESIONS SMALL N/A 1/3/2020    Procedure: TRANSURETHRAL RESECTION OF BLADDER TUMOR (TURBT); Surgeon: Andres Fernando MD;  Location[facility];  Service: Urology   • NV CYSTOURETHROSCOPY WITH BIOPSY N/A 4/13/2021    Procedure: Ziyad Duran;  Surgeon: Andres Fernando MD;  Location[facility];  Service: Urology   • NV TRURL ELECTROSURG RESCJ PROSTATE BLEED COMPLETE N/A 4/13/2021    Procedure: TRANSURETHRAL RESECTION OF PROSTATE (TURP) BLADDER BIOPSY, FULGURATION;  Surgeon: Andres Fernando MD;  Location[facility];  Service: Urology       Meds/Allergies:    Prior to Admission medications    Medication Sig Start Date End Date Taking?  Authorizing Provider   acetaminophen (TYLENOL) 325 mg tablet Take 3 tablets (975 mg total) by mouth every 8 (eight) hours as needed for mild pain or moderate pain 2/28/23 Rosa Spencer DO   albuterol (PROVENTIL HFA,VENTOLIN HFA) 90 mcg/act inhaler Inhale 2 puffs every 6 (six) hours as needed for wheezing 6/12/23   DELIA Pruitt   aspirin 81 mg chewable tablet Chew 1 tablet (81 mg total) daily 4/1/22 6/26/23  Hong Singh MD   azithromycin (ZITHROMAX) 500 MG tablet Take 1 tablet (500 mg total) by mouth every 24 hours for 1 dose 7/21/23 7/22/23  Jesus Manuel Marie MD   budesonide (PULMICORT) 0.5 mg/2 mL nebulizer solution TAKE 2 ML (0.5 MG TOTAL) BY NEBULIZATION TWICE A DAY RINSE MOUTH AFTER USE 5/10/23   Michael Burger MD   chlorhexidine (PERIDEX) 0.12 % solution Apply 15 mL to the mouth or throat every 12 (twelve) hours 6/12/23   DELIA Pruitt   cyanocobalamin (VITAMIN B-12) 1000 MCG tablet Take 1 tablet (1,000 mcg total) by mouth daily 5/10/23   Rosa Spencer DO   Diclofenac Sodium (VOLTAREN) 1 % APPLY 2 GRAMS 4 TIMES A DAY 3/13/23   Historical Provider, MD   docusate sodium (COLACE) 100 mg capsule Take 1 capsule (100 mg total) by mouth 2 (two) times a day 7/21/23   Jesus Manuel Marie MD   ergocalciferol (VITAMIN D2) 50,000 units TAKE 1 CAPSULE (50,000 UNITS TOTAL) BY MOUTH EVERY 28 DAYS 12/11/22   Fatmata Tirado DO   famotidine (PEPCID) 20 mg tablet Take 1 tablet (20 mg total) by mouth 2 (two) times a day for 14 days 7/21/23 8/4/23  Jesus Manuel Marie MD   formoterol (PERFOROMIST) 20 MCG/2ML nebulizer solution TAKE 2 ML (20 MCG TOTAL) BY NEBULIZATION 2 (TWO) TIMES A DAY 4/17/23   Historical Provider, MD   guaiFENesin (MUCINEX) 600 mg 12 hr tablet Take 2 tablets (1,200 mg total) by mouth every 12 (twelve) hours 9/27/22   Calvin Jiménez DO   ipratropium-albuterol (DUO-NEB) 0.5-2.5 mg/3 mL nebulizer solution Take 3 mL by nebulization 4 (four) times a day 7/14/23   Historical Provider, MD   Melatonin 5 MG TABS Take 1 tablet by mouth daily at bedtime 3/13/23   Historical Provider, MD   potassium chloride (K-DUR,KLOR-CON) 20 mEq tablet Take 1 tablet (20 mEq total) by mouth 2 (two) times a day 7/21/23   Mirella Suárez MD   predniSONE 10 mg tablet Take 6 tablets (60 mg total) by mouth daily for 2 days Then 5 tablets (50 mg total) by mouth daily for 3 days, then 4 tablets (40 mg total) by mouth daily for 3 days, then 3 tablets (30 mg total) by mouth daily for 3 days, then 2 tablets (20 mg total) by mouth daily for 3 days, then 1 tablet (10 mg total) by mouth daily for 3 days then continue with Prednisone 5 mg daily Do not start before July 22, 2023. 7/22/23 7/24/23  Mirella Suárez MD   predniSONE 5 mg tablet Take 1 tablet (5 mg total) by mouth daily Do not start before August 5, 2023. 8/5/23   Mirella Suárez MD   rosuvastatin (CRESTOR) 10 MG tablet Take 1 tablet (10 mg total) by mouth daily 3/27/23   DELIA Mesa   senna (SENOKOT) 8.6 mg Take 1 tablet (8.6 mg total) by mouth daily at bedtime 7/21/23   Mirella Suárez MD   sodium chloride (OCEAN) 0.65 % nasal spray 1 spray into each nostril every hour as needed for congestion 6/12/23   DELIA Rico   tamsulosin [facility]) 0.4 mg Take 1 capsule (0.4 mg total) by mouth daily with dinner 2/28/23   Alex Spencer DO   torsemide (DEMADEX) 10 mg tablet Take 3 tablets (30 mg total) by mouth daily 7/21/23   Mirella Suárez MD   budesonide-formoterol (Symbicort) 160-4.5 mcg/act inhaler Inhale 2 puffs 2 (two) times a day Rinse mouth after use. 8/29/22 8/29/22  Sheron Segura DO   mometasone-formoterol [facility]) 200-5 MCG/ACT inhaler Inhale 2 puffs 2 (two) times a day Rinse mouth after use. 8/29/22 8/29/22  Sheron Segura DO     I have reviewed home medications using allscripts.     Allergies: No Known Allergies    Social History:     Marital Status: /Civil Union     Substance Use History:   Social History     Substance and Sexual Activity   Alcohol Use Not Currently    Comment: rarely     Social History     Tobacco Use   Smoking Status Former   • Packs/day: 2.50   • Years: 42.00   • Total pack years: 105.00   • Types: Cigarettes   • Start date: 5   • Quit date:    • Years since quittin.5   Smokeless Tobacco Former   Tobacco Comments    1 ppd for 37 years,  down to 5 cigs a day, is around second hand smoke     Social History     Substance and Sexual Activity   Drug Use No       Family History:    Family History   Problem Relation Age of Onset   • Diabetes Mother        Physical Exam:     Vitals:   Blood Pressure: 105/69 (23 184)  Pulse: 82 (23)  Temperature: 97.9 °F (36.6 °C) (23 1449)  Temp Source: Rectal (23 1449)  Respirations: (!) 31 (23)  SpO2: 99 % (23)    Physical Exam  Vitals and nursing note reviewed. Constitutional:       General: He is in acute distress. Appearance: He is well-developed. He is cachectic. He is ill-appearing and toxic-appearing. Interventions: Face mask in place. Comments: On BiPAP   HENT:      Head: Normocephalic and atraumatic. Eyes:      Conjunctiva/sclera: Conjunctivae normal.   Cardiovascular:      Rate and Rhythm: Normal rate and regular rhythm. Heart sounds: No murmur heard. Pulmonary:      Effort: Respiratory distress present. Musculoskeletal:         General: No swelling. Cervical back: Neck supple. Skin:     General: Skin is warm and dry. Capillary Refill: Capillary refill takes less than 2 seconds. Neurological:      Mental Status: He is lethargic. Additional Data:     Lab Results: I have personally reviewed pertinent reports.       Results from last 7 days   Lab Units 23  1539   WBC Thousand/uL 11.54*   HEMOGLOBIN g/dL 12.0   HEMATOCRIT % 39.1   PLATELETS Thousands/uL 216   NEUTROS PCT % 94*   LYMPHS PCT % 2*   MONOS PCT % 3*   EOS PCT % 0     Results from last 7 days   Lab Units 23  1539   SODIUM mmol/L 129*   POTASSIUM mmol/L 5.2   CHLORIDE mmol/L 78*   CO2 mmol/L >45*   BUN mg/dL 22   CREATININE mg/dL 0.77   CALCIUM mg/dL 9.5   ALBUMIN g/dL 3.7   TOTAL BILIRUBIN mg/dL 0.72   ALK PHOS U/L 65   ALT U/L 27   AST U/L 34   GLUCOSE RANDOM mg/dL 292*     Results from last 7 days   Lab Units 07/19/23  0618   INR  0.78*     Results from last 7 days   Lab Units 07/22/23  1626 07/21/23  1059 07/21/23  0618 07/20/23  2046 07/20/23  1548 07/20/23  1050 07/20/23  0609 07/19/23  2256 07/19/23  1753 07/19/23  1318 07/19/23  0657   POC GLUCOSE mg/dl 304* 232* 165* 160* 144* 288* 132 213* 273* 246* 190*     Results from last 7 days   Lab Units 07/20/23  0516   HEMOGLOBIN A1C % 5.7*     Results from last 7 days   Lab Units 07/20/23  0516 07/19/23  0828   PROCALCITONIN ng/ml 0.06 0.05       Imaging: I have personally reviewed pertinent reports. XR chest portable   ED Interpretation by Yonis Clemons DO (07/22 1801)   Chest x-ray interpreted by me shows slight increased pulm vascular congestion when compared with July 19, 2023. No focal infiltrate or pneumothorax            ** Please Note: This note has been constructed using a voice recognition system.  **

## 2023-07-22 NOTE — RESPIRATORY THERAPY NOTE
RT Protocol Note  Herb Lewis. 79 y.o. male MRN: [id number]  Unit/Bed#: [facility] Encounter: [id number]    Assessment    Active Problems: There are no active Hospital Problems. Home Pulmonary Medications:    Home Devices/Therapy: BiPAP/CPAP    Past Medical History:   Diagnosis Date    Acute metabolic encephalopathy 8103    Arthritis     Bladder mass     Cardiomyopathy (HCC)     Chest pain     COPD (chronic obstructive pulmonary disease) (HCC)     COVID-19 virus infection 2023    CPAP (continuous positive airway pressure) dependence     Emphysema of lung (HCC)     Hypoxia     nocturnal    Left bundle branch block     Multiple pulmonary nodules     last assessed: 10/12/16    Pneumonia     Sleep apnea     Sleep apnea, obstructive     Smoker     Weight loss 2019     Social History     Socioeconomic History    Marital status: /Civil Union     Spouse name: None    Number of children: None    Years of education: None    Highest education level: None   Occupational History    None   Tobacco Use    Smoking status: Former     Packs/day: 2.50     Years: 42.00     Total pack years: 105.00     Types: Cigarettes     Start date:      Quit date:      Years since quittin.5    Smokeless tobacco: Former    Tobacco comments:     1 ppd for 37 years, 2010 down to 5 cigs a day, is around second hand smoke   Vaping Use    Vaping Use: Never used   Substance and Sexual Activity    Alcohol use: Not Currently     Comment: rarely    Drug use: No    Sexual activity: Not Currently     Partners: Female   Other Topics Concern    None   Social History Narrative    Daily coffee consumption: 8 or more cups a day        Used to work in [employer]. On disability.      Social Determinants of Health     Financial Resource Strain: Not on file   Food Insecurity: No Food Insecurity (2023)    Hunger Vital Sign     Worried About Running Out of Food in the Last Year: Never true     Ran Out of Food in the Last Year: Never true   Transportation Needs: No Transportation Needs (7/20/2023)    PRAPARE - Transportation     Lack of Transportation (Medical): No     Lack of Transportation (Non-Medical): No   Physical Activity: Not on file   Stress: Not on file   Social Connections: Not on file   Intimate Partner Violence: Not on file   Housing Stability: Low Risk  (7/20/2023)    Housing Stability Vital Sign     Unable to Pay for Housing in the Last Year: No     Number of Places Lived in the Last Year: 1     Unstable Housing in the Last Year: No       Subjective         Objective    Physical Exam:   Assessment Type: Assess only  General Appearance: Lethargic  Respiratory Pattern: Labored  Chest Assessment: Chest expansion symmetrical  Bilateral Breath Sounds: Diminished  O2 Device: (P) Bipap    Vitals:  Blood pressure 93/57, pulse 78, temperature 97.9 °F (36.6 °C), temperature source Rectal, resp. rate (!) 25, SpO2 100 %. Results from last 7 days   Lab Units 07/20/23  1437   PH ART  7.438   PCO2 ART mm Hg 78.4*   PO2 ART mm Hg 75.7   HCO3 ART mmol/L 51.8*   BASE EXC ART mmol/L 23.3   O2 CONTENT ART mL/dL 15.8*   O2 HGB, ARTERIAL % 94.2   ABG SOURCE  Radial, Left   CANELO TEST  Yes       Imaging and other studies: I have personally reviewed pertinent reports. O2 Device: (P) Bipap     Plan    Respiratory Plan: Moderate/Severe Distress pathway, Vent/NIV/HFNC        Resp Comments: (P) Pt assessed per resp protocol. Pt takes home UDN. Will order UDN TID. Pt has hx KEVIN, Copd

## 2023-07-22 NOTE — ASSESSMENT & PLAN NOTE
Wt Readings from Last 3 Encounters:   07/22/23 42.9 kg (94 lb 9.2 oz)   07/21/23 44.5 kg (98 lb 3.2 oz)   06/26/23 43.8 kg (96 lb 8 oz)     Last echocardiogram from 5/2023 shows LVEF 20%  Today proBNP mildly elevated  Hold home torsemide, start IV Lasix 40 mg twice daily

## 2023-07-22 NOTE — ED ATTENDING ATTESTATION
7/22/2023  IErick DO, saw and evaluated the patient. I have discussed the patient with the resident/non-physician practitioner and agree with the resident's/non-physician practitioner's findings, Plan of Care, and MDM as documented in the resident's/non-physician practitioner's note, except where noted. All available labs and Radiology studies were reviewed. I was present for key portions of any procedure(s) performed by the resident/non-physician practitioner and I was immediately available to provide assistance. At this point I agree with the current assessment done in the Emergency Department. I have conducted an independent evaluation of this patient a history and physical is as follows:    Patient is a 80-year-old male with a history of O2 dependent COPD, CHF, chronic hyponatremia, brought in by EMS. The patient was hospitalized July 19 through July 21 for shortness of breath and increased somnolence noted by his family after a fall in bed several weeks prior. He was noted to be in acute on chronic systolic heart failure and COPD with exacerbation, treated with Lasix, Solu-Medrol, nebulized treatments, noninvasive ventilation. He was discharged yesterday with a prednisone taper and his home oxyen. Patient says he was feeling okay yesterday but today he began feeling more short of breath, no chest pain, no palpitations, no fever or chills. Did notice that his feet were little more swollen. No weakness. His wife called 46. EMS indicated when they arrived they found that the patient's home oxygen was on but there appeared to be a defect in the either tubing or device so that there was not a significant mount of oxygen flowing to the patient and his room air saturation was 64%. His blood sugar was 457.   They placed him on noninvasive ventilation, administered 2 breathing treatments as well as 125 mg of Solu-Medrol, and his oxygen level improved significantly and his work of breathing decreased. Patient says he now feels better. General:  Patient appears in respiratory distress on CPAP  Head:  Atraumatic  Eyes:  Conjunctiva pink  ENT:  Mucous membranes are moist  Neck:  Supple  Cardiac:  S1-S2, without murmurs  Lungs: deCreased air movement, slight rales. Abdomen:  Soft, nontender, normal bowel sounds, no CVA tenderness, no tympany, no rigidity, no guarding  Extremities:  Normal range of motion, mild edema of his feet and ankles symmetric bilaterally, no calf erythema, swelling or asymmetry. Neurologic:  Awake, fluent speech, normal comprehension, AAOx3  Skin:  Pink warm and dry  Psychiatric:  Alert, pleasant, cooperative      ED Course  ED Course as of 07/22/23 1526   Sat Jul 22, 2023   1523 ECG interpreted by me, sinus rhythm, rate of 108, nonspecific intraventricular conduction delay, no acute ischemic or infarctive changes, no acute change from July 19, 2023     Patient arrived in respiratory distress, placed on BiPAP,    Patient's previous hospitalization he was level 3 DNR DNI, confirmed these wishes with the patient. XR chest portable   ED Interpretation   Chest x-ray interpreted by me shows slight increased pulm vascular congestion when compared with July 19, 2023. No focal infiltrate or pneumothorax        Labs Reviewed   CBC AND DIFFERENTIAL - Abnormal       Result Value Ref Range Status    WBC 11.54 (*) 4.31 - 10.16 Thousand/uL Final    RBC 3.77 (*) 3.88 - 5.62 Million/uL Final    Hemoglobin 12.0  12.0 - 17.0 g/dL Final    Hematocrit 39.1  36.5 - 49.3 % Final     (*) 82 - 98 fL Final    MCH 31.8  26.8 - 34.3 pg Final    MCHC 30.7 (*) 31.4 - 37.4 g/dL Final    RDW 13.0  11.6 - 15.1 % Final    MPV 9.8  8.9 - 12.7 fL Final    Platelets 701  888 - 390 Thousands/uL Final    nRBC 0  /100 WBCs Final    Comment: This is an appended report. These results have been appended to a previously preliminary verified report.     Neutrophils Relative 94 (*) 43 - 75 % Final    Immat GRANS % 1  0 - 2 % Final    Lymphocytes Relative 2 (*) 14 - 44 % Final    Monocytes Relative 3 (*) 4 - 12 % Final    Eosinophils Relative 0  0 - 6 % Final    Basophils Relative 0  0 - 1 % Final    Neutrophils Absolute 10.94 (*) 1.85 - 7.62 Thousands/µL Final    Immature Grans Absolute 0.07  0.00 - 0.20 Thousand/uL Final    Lymphocytes Absolute 0.23 (*) 0.60 - 4.47 Thousands/µL Final    Monocytes Absolute 0.30  0.17 - 1.22 Thousand/µL Final    Eosinophils Absolute 0.00  0.00 - 0.61 Thousand/µL Final    Basophils Absolute 0.00  0.00 - 0.10 Thousands/µL Final    Narrative: This is an appended report. These results have been appended to a previously verified report. COMPREHENSIVE METABOLIC PANEL - Abnormal    Sodium 129 (*) 135 - 147 mmol/L Final    Potassium 5.2  3.5 - 5.3 mmol/L Final    Chloride 78 (*) 96 - 108 mmol/L Final    CO2 >45 (*) 21 - 32 mmol/L Final    Comment: [id number]    ANION GAP     Final    Comment: Unable to calculate    BUN 22  5 - 25 mg/dL Final    Creatinine 0.77  0.60 - 1.30 mg/dL Final    Comment: Standardized to IDMS reference method    Glucose 292 (*) 65 - 140 mg/dL Final    Comment: If the patient is fasting, the ADA then defines impaired fasting glucose as > 100 mg/dL and diabetes as > or equal to 123 mg/dL. Specimen collection should occur prior to Sulfasalazine administration due to the potential for falsely depressed results. Specimen collection should occur prior to Sulfapyridine administration due to the potential for falsely elevated results. Calcium 9.5  8.3 - 10.1 mg/dL Final    AST 34  5 - 45 U/L Final    Comment: Specimen collection should occur prior to Sulfasalazine administration due to the potential for falsely depressed results. ALT 27  12 - 78 U/L Final    Comment: Specimen collection should occur prior to Sulfasalazine and/or Sulfapyridine administration due to the potential for falsely depressed results.      Alkaline Phosphatase 65  46 - 116 U/L Final    Total Protein 6.9  6.4 - 8.4 g/dL Final    Albumin 3.7  3.5 - 5.0 g/dL Final    Total Bilirubin 0.72  0.20 - 1.00 mg/dL Final    Comment: Use of this assay is not recommended for patients undergoing treatment with eltrombopag due to the potential for falsely elevated results. eGFR 94  ml/min/1.73sq m Final    Narrative:     National Kid[employer] guidelines for Chronic Kidney Disease (CKD):   •  Stage 1 with normal or high GFR (GFR > 90 mL/min/1.73 square meters)  •  Stage 2 Mild CKD (GFR = 60-89 mL/min/1.73 square meters)  •  Stage 3A Moderate CKD (GFR = 45-59 mL/min/1.73 square meters)  •  Stage 3B Moderate CKD (GFR = 30-44 mL/min/1.73 square meters)  •  Stage 4 Severe CKD (GFR = 15-29 mL/min/1.73 square meters)  •  Stage 5 End Stage CKD (GFR <15 mL/min/1.73 square meters)  Note: GFR calculation is accurate only with a steady state creatinine   B-TYPE NATRIURETIC PEPTIDE (BNP) - Abnormal     (*) 0 - 100 pg/mL Final   BLOOD GAS, VENOUS - Abnormal    pH, Oscar 7.320  7.300 - 7.400 Final    pCO2, Oscar 106.3 (*) 42.0 - 50.0 mm Hg Final    pO2, Oscar 43.5  35.0 - 45.0 mm Hg Final    HCO3, Oscar 53.5 (*) 24 - 30 mmol/L Final    Base Excess, Oscar 22.3  mmol/L Final    O2 Content, Oscar 12.1  ml/dL Final    O2 HGB, VENOUS 72.0  60.0 - 80.0 % Final   POCT GLUCOSE - Abnormal    POC Glucose 304 (*) 65 - 140 mg/dl Final   HS TROPONIN I 0HR - Normal    hs TnI 0hr 36  "Refer to ACS Flowchart"- see link ng/L Final    Comment:                                              Initial (time 0) result  If >=50 ng/L, Myocardial injury suggested ;  Type of myocardial injury and treatment strategy  to be determined. If 5-49 ng/L, a delta result at 2 hours and or 4 hours will be needed to further evaluate. If <4 ng/L, and chest pain has been >3 hours since onset, patient may qualify for discharge based on the HEART score in the ED.   If <5 ng/L and <3hours since onset of chest pain, a delta result at 2 hours will be needed to further evaluate. HS Troponin 99th Percentile URL of a Health Population=12 ng/L with a 95% Confidence Interval of 8-18 ng/L. Second Troponin (time 2 hours)  If calculated delta >= 20 ng/L,  Myocardial injury suggested ; Type of myocardial injury and treatment strategy to be determined. If 5-49 ng/L and the calculated delta is 5-19 ng/L, consult medical service for evaluation. Continue evaluation for ischemia on ecg and other possible etiology and repeat hs troponin at 4 hours. If delta is <5 ng/L at 2 hours, consider discharge based on risk stratification via the HEART score (if in ED), or MARK risk score in IP/Observation. HS Troponin 99th Percentile URL of a Health Population=12 ng/L with a 95% Confidence Interval of 8-18 ng/L. D-DIMER, QUANTITATIVE - Normal    D-Dimer, Quant <0.27  <0.50 ug/ml FEU Final    Comment: Reference and upper limits to exclude DVT and PE are the same. Do not use to exclude if clinical symptoms are present. Narrative: In the evaluation for possible pulmonary embolism, in the appropriate (Well's Score of 4 or less) patient, the age adjusted d-dimer cutoff for this patient can be calculated as:    Age x 0.01 (in ug/mL) for Age-adjusted D-dimer exclusion threshold for a patient over 50 years. HS TROPONIN I 2HR   HS TROPONIN I 4HR     Reassessed several times, seem to be more tired and somnolent. Noninvasive ventilation adjusted to increase IPAP. Patient's blood gas shows hypercarbia, increased from discharge. I do not believe this patient's complaints are from pulmonary embolism and I believe they would most likely be harmed through false positive test results and other complications of testing by further pursuing the diagnosis of pulmonary embolism. Given patient's goals of care, patient is admitted to level 2 stepdown. No indication for intubation given his goals of care desire.     MEDICAL DECISION MAKING CODING    The differential diagnosis before testing included (but is not limited to) acute COPD exacerbation, acute CHF exacerbation, acute coronary syndrome, and acute pulmonary embolism which is a medical condition that poses a threat to life/function. Patient presents with acute new problem with:  Threat to life or bodily function    Chronic conditions affecting care:  As per HPI    COLLECTION AND INTERPRETATION OF DATA  Additional history obtained from: EMS  I reviewed prior external notes, including July 19, 2023 chest x-ray    I ordered each unique test  Tests reviewed personally by me:  ECG: See my ED course  Labs: See above  Imaging: I independently reviewed the chest x-ray as noted above    Discussion with other providers: Admitting medicine physician    Tests considered but not ordered: See above    RISK    Treatment:  Patient admitted    Social Determinants of Health:  Presentation to ED outside of business hours or on night shift                  Critical Care Time  Procedures  45 minutes critical care

## 2023-07-22 NOTE — ASSESSMENT & PLAN NOTE
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 · On my exam, patient is cachectic, on continuous BiPAP, somnolent, respiratory distress. Saturating at 98% on BiPAP. · Labs reveal CO2 >45 on BMP, VBG shows PCO2 106. · Discussed with ED attending, hadley for level 2 stepdown on DNR/DNI patient  · Continue IV Solu-Medrol 40 mg every 8 hours, Xopenex/Atrovent 3 times daily, Pulmicort nebulizer. · Low utility for antibiotics as patient completed azithromycin yesterday  · Start IV Lasix 40 mg twice daily  · Hold all oral meds while on BiPAP  · Prognosis poor  · Patient is currently DNR/DNI. Discussed with wife that if patient were to decline, will need comfort care. Currently wife is not ready for comfort care/hospice care. · Consult palliative care  · Placed consult for pulmonology, discussed with on-call Dr. Jonathan Clark- continue current treatment. No other treatment recommended at this point.

## 2023-07-22 NOTE — ASSESSMENT & PLAN NOTE
Malnutrition Findings:                                 BMI Findings: Body mass index is 18.47 kg/m².

## 2023-07-22 NOTE — ED PROCEDURE NOTE
PROCEDURE  CriticalCare Time    Date/Time: 7/22/2023 6:03 PM    Performed by: Erick Bautista DO  Authorized by:  Erick Bautista DO    Critical care provider statement:     Critical care time (minutes):  45    Critical care time was exclusive of:  Separately billable procedures and treating other patients and teaching time    Critical care was necessary to treat or prevent imminent or life-threatening deterioration of the following conditions:  Respiratory failure    Critical care was time spent personally by me on the following activities:  Blood draw for specimens, obtaining history from patient or surrogate, development of treatment plan with patient or surrogate, discussions with consultants, evaluation of patient's response to treatment, examination of patient, ventilator management, review of old charts, re-evaluation of patient's condition, ordering and review of radiographic studies, ordering and review of laboratory studies and ordering and performing treatments and interventions    I assumed direction of critical care for this patient from another provider in my specialty: paul Bautista DO  07/22/23 2109

## 2023-07-22 NOTE — ASSESSMENT & PLAN NOTE
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 · Per EMS, patient was hypoxic at 60%, placed on BiPAP on route, and was given Solu-Medrol and DuoNeb on route. · On my exam, patient is cachectic, on continuous BiPAP, somnolent, respiratory distress. Saturating at 98% on BiPAP. · Labs reveal CO2 >45 on BMP, VBG shows PCO2 106. · Discussed with ED attending, hadley for level 2 stepdown on DNR/DNI patient  · Continue IV Solu-Medrol 40 mg every 8 hours, Xopenex/Atrovent 3 times daily, Pulmicort nebulizer. · Low utility for antibiotics as patient completed azithromycin yesterday  · Start IV Lasix 40 mg twice daily  · Hold all oral meds while on BiPAP  · Prognosis poor  · Patient is currently DNR/DNI. Discussed with wife that if patient were to decline, will need comfort care. Currently wife is not ready for comfort care/hospice care. · Placed consult for pulmonology, discussed with on-call Dr. Jonathan Clark- continue current treatment. No other treatment recommended at this point.

## 2023-07-22 NOTE — ED NOTES
RN called Dr. Christiano Lloyd and Dr. Shepherd Rued to bedside as BP 90s/50s, HR 70s, and pt hard to arouse. BG and VBG ordered as well as Bipap parameters adjusted by RT. Family at bedside and updated.       Azam Gilliland RN  07/22/23 1640

## 2023-07-22 NOTE — ED ATTENDING ATTESTATION
7/19/2023  I, Mirna Hernandez DO, saw and evaluated the patient. I have discussed the patient with the resident/non-physician practitioner and agree with the resident's/non-physician practitioner's findings, Plan of Care, and MDM as documented in the resident's/non-physician practitioner's note, except where noted. All available labs and Radiology studies were reviewed. I was present for key portions of any procedure(s) performed by the resident/non-physician practitioner and I was immediately available to provide assistance. At this point I agree with the current assessment done in the Emergency Department. I have conducted an independent evaluation of this patient a history and physical is as follows:    80-year-old male on 2 L nasal cannula at baseline for severe COPD history of CHF presents after falling out of bed. The patient did not strike his head he felt he was weak and fell around. He is short of breath. He is at 6 L nasal cannula which is more than his baseline he is tachypneic he has diffuse wheezing. He looks like he is in respiratory distress. He is contracted. Patient is very ill-appearing, concern for COPD exacerbation pneumonia metabolic range and respiratory acidosis, will order CT scans to rule out trauma, patient will require admission secondary to respiratory distress.   Continue oxygen supplementation    ED Course         Critical Care Time  CriticalCare Time    Date/Time: 7/22/2023 1:14 PM    Performed by: Mirna Hernandez DO  Authorized by: Mirna Hernandez DO    Critical care provider statement:     Critical care time (minutes):  30    Critical care time was exclusive of:  Separately billable procedures and treating other patients and teaching time    Critical care was necessary to treat or prevent imminent or life-threatening deterioration of the following conditions:  Respiratory failure    Critical care was time spent personally by me on the following activities: Blood draw for specimens, obtaining history from patient or surrogate, re-evaluation of patient's condition, review of old charts, evaluation of patient's response to treatment, examination of patient, ordering and review of laboratory studies, ordering and performing treatments and interventions, development of treatment plan with patient or surrogate and ordering and review of radiographic studies

## 2023-07-22 NOTE — RESPIRATORY THERAPY NOTE
07/22/23 1450   Respiratory Assessment   Assessment Type Assess only   General Appearance Awake; Alert   Respiratory Pattern Labored   Chest Assessment Chest expansion symmetrical   Bilateral Breath Sounds Diminished   Resp Comments Pt arrived via ambulance on Bipap, pt placed on our Bipap machine. Pt c/o feet swelling.    Non-Invasive Information   O2 Interface Device Face mask   Non-Invasive Ventilation Mode BiPAP   $ Continous NIV Initial   $ Pulse Oximetry Spot Check Charge Completed   Non-Invasive Settings   IPAP (cm) 12 cm   EPAP (cm) 6 cm   FiO2 (%) 40   Inspiratory Time (Set) 1   Non-Invasive Readings   Skin Intervention Skin intact   Total Rate 32   Vt (mL) (Mech) 299   MV (Mech) 8.1   Peak Pressure (Obs) 12   Non-Invasive Alarms   Insp Pressure High (cm H20) 40   Insp Pressure Low (cm H20) 4   Low Insp Pressure Time (sec) 20 sec   Vt High (mL) 1150   Vt Low (mL) 100   High Resp Rate (BPM) 40 BPM   Low Resp Rate (BPM) 8 BPM   Apnea Interval (sec) 20   Apnea Pressure 20

## 2023-07-23 LAB
ALBUMIN SERPL BCP-MCNC: 3.4 G/DL (ref 3.5–5)
ALP SERPL-CCNC: 56 U/L (ref 46–116)
ALT SERPL W P-5'-P-CCNC: 25 U/L (ref 12–78)
AST SERPL W P-5'-P-CCNC: 31 U/L (ref 5–45)
ATRIAL RATE: 108 BPM
BILIRUB SERPL-MCNC: 0.7 MG/DL (ref 0.2–1)
BUN SERPL-MCNC: 22 MG/DL (ref 5–25)
CALCIUM ALBUM COR SERPL-MCNC: 10 MG/DL (ref 8.3–10.1)
CALCIUM SERPL-MCNC: 9.5 MG/DL (ref 8.3–10.1)
CHLORIDE SERPL-SCNC: 81 MMOL/L (ref 96–108)
CO2 SERPL-SCNC: >45 MMOL/L (ref 21–32)
CREAT SERPL-MCNC: 0.67 MG/DL (ref 0.6–1.3)
ERYTHROCYTE [DISTWIDTH] IN BLOOD BY AUTOMATED COUNT: 12.8 % (ref 11.6–15.1)
GFR SERPL CREATININE-BSD FRML MDRD: 100 ML/MIN/1.73SQ M
GLUCOSE SERPL-MCNC: 125 MG/DL (ref 65–140)
GLUCOSE SERPL-MCNC: 137 MG/DL (ref 65–140)
GLUCOSE SERPL-MCNC: 193 MG/DL (ref 65–140)
GLUCOSE SERPL-MCNC: 305 MG/DL (ref 65–140)
GLUCOSE SERPL-MCNC: 306 MG/DL (ref 65–140)
HCT VFR BLD AUTO: 37.3 % (ref 36.5–49.3)
HGB BLD-MCNC: 11.5 G/DL (ref 12–17)
MCH RBC QN AUTO: 31.8 PG (ref 26.8–34.3)
MCHC RBC AUTO-ENTMCNC: 30.8 G/DL (ref 31.4–37.4)
MCV RBC AUTO: 103 FL (ref 82–98)
P AXIS: 78 DEGREES
PLATELET # BLD AUTO: 185 THOUSANDS/UL (ref 149–390)
PMV BLD AUTO: 9.7 FL (ref 8.9–12.7)
POTASSIUM SERPL-SCNC: 4.9 MMOL/L (ref 3.5–5.3)
PR INTERVAL: 158 MS
PROT SERPL-MCNC: 6.2 G/DL (ref 6.4–8.4)
QRS AXIS: 79 DEGREES
QRSD INTERVAL: 124 MS
QT INTERVAL: 344 MS
QTC INTERVAL: 460 MS
RBC # BLD AUTO: 3.62 MILLION/UL (ref 3.88–5.62)
SODIUM SERPL-SCNC: 131 MMOL/L (ref 135–147)
T WAVE AXIS: 80 DEGREES
VENTRICULAR RATE: 108 BPM
WBC # BLD AUTO: 6.08 THOUSAND/UL (ref 4.31–10.16)

## 2023-07-23 PROCEDURE — 94760 N-INVAS EAR/PLS OXIMETRY 1: CPT

## 2023-07-23 PROCEDURE — 94664 DEMO&/EVAL PT USE INHALER: CPT

## 2023-07-23 PROCEDURE — 82948 REAGENT STRIP/BLOOD GLUCOSE: CPT

## 2023-07-23 PROCEDURE — 94003 VENT MGMT INPAT SUBQ DAY: CPT

## 2023-07-23 PROCEDURE — 99223 1ST HOSP IP/OBS HIGH 75: CPT | Performed by: INTERNAL MEDICINE

## 2023-07-23 PROCEDURE — 80053 COMPREHEN METABOLIC PANEL: CPT | Performed by: FAMILY MEDICINE

## 2023-07-23 PROCEDURE — 93010 ELECTROCARDIOGRAM REPORT: CPT | Performed by: INTERNAL MEDICINE

## 2023-07-23 PROCEDURE — 94640 AIRWAY INHALATION TREATMENT: CPT

## 2023-07-23 PROCEDURE — 99232 SBSQ HOSP IP/OBS MODERATE 35: CPT | Performed by: INTERNAL MEDICINE

## 2023-07-23 PROCEDURE — 85027 COMPLETE CBC AUTOMATED: CPT | Performed by: FAMILY MEDICINE

## 2023-07-23 RX ORDER — INSULIN LISPRO 100 [IU]/ML
1-6 INJECTION, SOLUTION INTRAVENOUS; SUBCUTANEOUS
Status: DISCONTINUED | OUTPATIENT
Start: 2023-07-23 | End: 2023-07-29 | Stop reason: HOSPADM

## 2023-07-23 RX ADMIN — IPRATROPIUM BROMIDE 0.5 MG: 0.5 SOLUTION RESPIRATORY (INHALATION) at 14:41

## 2023-07-23 RX ADMIN — FORMOTEROL FUMARATE DIHYDRATE 20 MCG: 20 SOLUTION RESPIRATORY (INHALATION) at 20:01

## 2023-07-23 RX ADMIN — LEVALBUTEROL HYDROCHLORIDE 1.25 MG: 1.25 SOLUTION RESPIRATORY (INHALATION) at 14:41

## 2023-07-23 RX ADMIN — METHYLPREDNISOLONE SODIUM SUCCINATE 40 MG: 40 INJECTION, POWDER, FOR SOLUTION INTRAMUSCULAR; INTRAVENOUS at 21:36

## 2023-07-23 RX ADMIN — FUROSEMIDE 40 MG: 10 INJECTION, SOLUTION INTRAMUSCULAR; INTRAVENOUS at 08:17

## 2023-07-23 RX ADMIN — BUDESONIDE 0.5 MG: 0.5 INHALANT ORAL at 07:14

## 2023-07-23 RX ADMIN — HEPARIN SODIUM 5000 UNITS: 5000 INJECTION INTRAVENOUS; SUBCUTANEOUS at 13:35

## 2023-07-23 RX ADMIN — HEPARIN SODIUM 5000 UNITS: 5000 INJECTION INTRAVENOUS; SUBCUTANEOUS at 05:19

## 2023-07-23 RX ADMIN — IPRATROPIUM BROMIDE 0.5 MG: 0.5 SOLUTION RESPIRATORY (INHALATION) at 07:14

## 2023-07-23 RX ADMIN — HEPARIN SODIUM 5000 UNITS: 5000 INJECTION INTRAVENOUS; SUBCUTANEOUS at 21:35

## 2023-07-23 RX ADMIN — METHYLPREDNISOLONE SODIUM SUCCINATE 40 MG: 40 INJECTION, POWDER, FOR SOLUTION INTRAMUSCULAR; INTRAVENOUS at 05:18

## 2023-07-23 RX ADMIN — INSULIN LISPRO 4 UNITS: 100 INJECTION, SOLUTION INTRAVENOUS; SUBCUTANEOUS at 11:09

## 2023-07-23 RX ADMIN — FUROSEMIDE 40 MG: 10 INJECTION, SOLUTION INTRAMUSCULAR; INTRAVENOUS at 16:05

## 2023-07-23 RX ADMIN — IPRATROPIUM BROMIDE 0.5 MG: 0.5 SOLUTION RESPIRATORY (INHALATION) at 20:01

## 2023-07-23 RX ADMIN — FORMOTEROL FUMARATE DIHYDRATE 20 MCG: 20 SOLUTION RESPIRATORY (INHALATION) at 07:14

## 2023-07-23 RX ADMIN — METHYLPREDNISOLONE SODIUM SUCCINATE 40 MG: 40 INJECTION, POWDER, FOR SOLUTION INTRAMUSCULAR; INTRAVENOUS at 13:35

## 2023-07-23 RX ADMIN — INSULIN LISPRO 4 UNITS: 100 INJECTION, SOLUTION INTRAVENOUS; SUBCUTANEOUS at 16:05

## 2023-07-23 RX ADMIN — INSULIN LISPRO 2 UNITS: 100 INJECTION, SOLUTION INTRAVENOUS; SUBCUTANEOUS at 21:34

## 2023-07-23 RX ADMIN — LEVALBUTEROL HYDROCHLORIDE 1.25 MG: 1.25 SOLUTION RESPIRATORY (INHALATION) at 07:14

## 2023-07-23 RX ADMIN — LEVALBUTEROL HYDROCHLORIDE 1.25 MG: 1.25 SOLUTION RESPIRATORY (INHALATION) at 20:01

## 2023-07-23 RX ADMIN — BUDESONIDE 0.5 MG: 0.5 INHALANT ORAL at 20:01

## 2023-07-23 NOTE — PLAN OF CARE
Problem: Potential for Falls  Goal: Patient will remain free of falls  Description: INTERVENTIONS:  - Educate patient/family on patient safety including physical limitations  - Instruct patient to call for assistance with activity   - Consult OT/PT to assist with strengthening/mobility   - Keep Call bell within reach  - Keep bed low and locked with side rails adjusted as appropriate  - Keep care items and personal belongings within reach  - Initiate and maintain comfort rounds  - Make Fall Risk Sign visible to staff  - Apply yellow socks and bracelet for high fall risk patients  - Consider moving patient to room near nurses station  Outcome: Progressing     Problem: MOBILITY - ADULT  Goal: Maintain or return to baseline ADL function  Description: INTERVENTIONS:  -  Assess patient's ability to carry out ADLs; assess patient's baseline for ADL function and identify physical deficits which impact ability to perform ADLs (bathing, care of mouth/teeth, toileting, grooming, dressing, etc.)  - Assess/evaluate cause of self-care deficits   - Assess range of motion  - Assess patient's mobility; develop plan if impaired  - Assess patient's need for assistive devices and provide as appropriate  - Encourage maximum independence but intervene and supervise when necessary  - Involve family in performance of ADLs  - Assess for home care needs following discharge   - Consider OT consult to assist with ADL evaluation and planning for discharge  - Provide patient education as appropriate  Outcome: Progressing  Goal: Maintains/Returns to pre admission functional level  Description: INTERVENTIONS:  - Perform BMAT or MOVE assessment daily.   - Set and communicate daily mobility goal to care team and patient/family/caregiver. - Collaborate with rehabilitation services on mobility goals if consulted  - Perform Range of Motion 3 times a day. - Reposition patient every 3 hours.   - Dangle patient 3 times a day  - Stand patient 3 times a day  - Ambulate patient 3 times a day  - Out of bed to chair 3 times a day   - Out of bed for meals 3 times a day  - Out of bed for toileting  - Record patient progress and toleration of activity level   Outcome: Progressing     Problem: Prexisting or High Potential for Compromised Skin Integrity  Goal: Skin integrity is maintained or improved  Description: INTERVENTIONS:  - Identify patients at risk for skin breakdown  - Assess and monitor skin integrity  - Assess and monitor nutrition and hydration status  - Monitor labs   - Assess for incontinence   - Turn and reposition patient  - Assist with mobility/ambulation  - Relieve pressure over bony prominences  - Avoid friction and shearing  - Provide appropriate hygiene as needed including keeping skin clean and dry  - Evaluate need for skin moisturizer/barrier cream  - Collaborate with interdisciplinary team   - Patient/family teaching  - Consider wound care consult   Outcome: Progressing

## 2023-07-23 NOTE — PROGRESS NOTES
[address] [place] [address]  Progress Note  Name: Randa Gonzales  MRN: [id number]  Unit/Bed#: PPHP 525-01 I Date of Admission: 7/22/2023   Date of Service: 7/23/2023 I Hospital Day: 1    Assessment/Plan   * End-stage COPD with acute exacerbation [facility])  Assessment & Plan  · Admitted from 7/19 to 7/21 with acute exacerbation and discharged on steroid taper. On O2 at 3L and Prednisone 5 mg at baseline  · Developed acute shortness of breath on 7/22 while sitting in the kitchen  · Per EMS, patient was hypoxic at 60%, placed on BiPAP on route, and was given Solu-Medrol and DuoNeb on route. · Discussed with pulmonology - ct Solumedrol 40 mg IV q 8h, Xopenex/Atrovent/Pulmicort/Formoterol nebs  · Pulm input appreciated    Acute on chronic HFrEF (heart failure with reduced ejection fraction) ([address])  Assessment & Plan  Wt Readings from Last 3 Encounters:   07/23/23 42.1 kg (92 lb 13 oz)   07/21/23 44.5 kg (98 lb 3.2 oz)   06/26/23 43.8 kg (96 lb 8 oz)     · Last echocardiogram from 5/2023 shows LVEF 20%  · Discharged on 7/21 after episode of acute on chronic HFrEF with increase in Torsemide from 20 to 30 mg  · On IV Lasix 40 mg BID  · Cardiology consult  · Monitor I/O, daily weights          Acute on chronic respiratory failure with hypoxia (HCC)  Assessment & Plan  · Due to above  · On home O2 at 3 L  · Now on 6L    Hyponatremia  Assessment & Plan  · Chronic hyponatremia noted, sodium at baseline    Chronic hypercapnia/KEVIN  Assessment & Plan  · BiPAP hs and prn    Prediabetes  Assessment & Plan  · HbA1c 5.7 on 7/20/23  · Monitor blood sugars on steroids    Prostate cancer (HCC)  Assessment & Plan  · Outpatient follow-up      VTE Pharmacologic Prophylaxis: VTE Score: 6 High Risk (Score >/= 5) - Pharmacological DVT Prophylaxis Ordered: heparin. Sequential Compression Devices Ordered. Patient Centered Rounds: I performed bedside rounds with nursing staff today.   Discussions with Specialists or Other Care Team Provider: Discussed with pulmonology    Education and Discussions with Family / Patient: Updated  (wife and son) at bedside. Total Time Spent on Date of Encounter in care of patient: 35 minutes This time was spent on one or more of the following: performing physical exam; counseling and coordination of care; obtaining or reviewing history; documenting in the medical record; reviewing/ordering tests, medications or procedures; communicating with other healthcare professionals and discussing with patient's family/caregivers. Current Length of Stay: 1 day(s)  Current Patient Status: Inpatient   Certification Statement: The patient will continue to require additional inpatient hospital stay due to COPD and CHF exacerbation    Code Status: Level 2 - DNAR: but accepts endotracheal intubation    Subjective:   Somnolence resolved after period of continuous BiPap. Shortness of breath improved. Objective:     Vitals:   Temp (24hrs), Av.2 °F (36.8 °C), Min:97.6 °F (36.4 °C), Max:99.3 °F (37.4 °C)    Temp:  [97.6 °F (36.4 °C)-99.3 °F (37.4 °C)] 98.1 °F (36.7 °C)  HR:  [62-94] 87  Resp:  [17-31] 22  BP: ()/() 112/68  SpO2:  [96 %-100 %] 99 %  Body mass index is 14.54 kg/m². Physical Exam:   Physical Exam  Vitals reviewed. Constitutional:       Appearance: He is underweight. HENT:      Head: Normocephalic. Nose: Nose normal.      Mouth/Throat:      Mouth: Mucous membranes are moist.   Eyes:      Extraocular Movements: Extraocular movements intact. Cardiovascular:      Rate and Rhythm: Normal rate and regular rhythm. Pulmonary:      Comments: Decreased breath sounds bilaterally  Abdominal:      General: Bowel sounds are normal. There is no distension. Palpations: Abdomen is soft. Tenderness: There is no abdominal tenderness. Musculoskeletal:         General: No swelling. Cervical back: Neck supple. Skin:     General: Skin is warm and dry. Neurological:      General: No focal deficit present. Mental Status: He is alert and oriented to person, place, and time. Additional Data:     Labs:  Results from last 7 days   Lab Units 07/23/23  0524 07/22/23  1948 07/22/23  1539   WBC Thousand/uL 6.08  --  11.54*   HEMOGLOBIN g/dL 11.5*  --  12.0   HEMATOCRIT % 37.3  --  39.1   PLATELETS Thousands/uL 185   < > 216   NEUTROS PCT %  --   --  94*   LYMPHS PCT %  --   --  2*   MONOS PCT %  --   --  3*   EOS PCT %  --   --  0    < > = values in this interval not displayed. Results from last 7 days   Lab Units 07/23/23  0524   SODIUM mmol/L 131*   POTASSIUM mmol/L 4.9   CHLORIDE mmol/L 81*   CO2 mmol/L >45*   BUN mg/dL 22   CREATININE mg/dL 0.67   CALCIUM mg/dL 9.5   ALBUMIN g/dL 3.4*   TOTAL BILIRUBIN mg/dL 0.70   ALK PHOS U/L 56   ALT U/L 25   AST U/L 31   GLUCOSE RANDOM mg/dL 125     Results from last 7 days   Lab Units 07/19/23  0618   INR  0.78*     Results from last 7 days   Lab Units 07/23/23  1546 07/23/23  1038 07/23/23  0525 07/22/23  2356 07/22/23  1947 07/22/23  1626 07/21/23  1059 07/21/23  0618 07/20/23  2046 07/20/23  1548 07/20/23  1050 07/20/23  0609   POC GLUCOSE mg/dl 306* 305* 137 119 191* 304* 232* 165* 160* 144* 288* 132     Results from last 7 days   Lab Units 07/20/23  0516   HEMOGLOBIN A1C % 5.7*     Results from last 7 days   Lab Units 07/20/23  0516 07/19/23  0828   PROCALCITONIN ng/ml 0.06 0.05       Lines/Drains:  Invasive Devices     Peripheral Intravenous Line  Duration           Peripheral IV 07/22/23 Distal;Left;Upper;Ventral (anterior) Arm 1 day    Peripheral IV 07/22/23 Dorsal (posterior); Left Forearm 1 day          Drain  Duration           External Urinary Catheter Small <1 day                Last 24 Hours Medication List:   Current Facility-Administered Medications   Medication Dose Route Frequency Provider Last Rate   • budesonide  0.5 mg Nebulization Q12H Lucrecia Kelly MD     • formoterol  20 mcg Nebulization MD Vianney     • furosemide  40 mg Intravenous BID (diuretic) Hattie Weeks MD     • heparin (porcine)  5,000 Units Subcutaneous Q8H [address], MD     • insulin lispro  1-6 Units Subcutaneous 4x Daily (AC & HS) Tiffany Maya MD     • ipratropium  0.5 mg Nebulization TID Redia Fail, DO     • levalbuterol  1.25 mg Nebulization TID Redia Fail, DO     • methylPREDNISolone sodium succinate  40 mg Intravenous Q8H [address], MD     • ondansetron  4 mg Intravenous Q6H PRN Hattie Weeks MD          Today, Patient Was Seen By: Tiffany Maya MD    **Please Note: This note may have been constructed using a voice recognition system. **

## 2023-07-23 NOTE — ASSESSMENT & PLAN NOTE
· Admitted from 7/19 to 7/21 with acute exacerbation and discharged on steroid taper. On O2 at 3L and Prednisone 5 mg at baseline  · Developed acute shortness of breath on 7/22 while sitting in the kitchen  · Per EMS, patient was hypoxic at 60%, placed on BiPAP on route, and was given Solu-Medrol and DuoNeb on route.   · Discussed with pulmonology - ct Solumedrol 40 mg IV q 8h, Xopenex/Atrovent/Pulmicort/Formoterol nebs  · Pulm input appreciated

## 2023-07-23 NOTE — CONSULTS
Consultation - [address]  79 y.o.  male  PPHP 525/PPHP 525-01   MRN: [id number]  Encounter: [id number]    ASSESSMENT:    Patient Active Problem List   Diagnosis   • Nonobstructive atherosclerosis of coronary artery   • Nonischemic cardiomyopathy (HCC)   • Chronic obstructive pulmonary disease with acute exacerbation (HCC)   • Left bundle-branch block   • KEVIN and COPD overlap syndrome (HCC)   • Gastroesophageal reflux disease   • Prediabetes   • Osteoarthritis   • Osteoporosis   • Vitamin D deficiency   • Mood disorder (HCC)   • Other insomnia   • Macrocytosis without anemia   • Chronic respiratory failure with hypoxia and hypercapnia (HCC)   • Goals of care, counseling/discussion   • BPH with obstruction/lower urinary tract symptoms   • Prostate cancer ([address])   • Acute on chronic respiratory failure with hypoxia (HCC)   • Pulmonary nodules/lesions, multiple   • Protein-calorie malnutrition (HCC)   • Chronic HFrEF (heart failure with reduced ejection fraction) (HCC)   • Chronic anemia   • Hyponatremia   • Physical deconditioning   • Hyperglycemia   • COPD exacerbation (HCC)   • HLD (hyperlipidemia)   • Moderate protein-calorie malnutrition (HCC)   • Acute on chronic HFrEF (heart failure with reduced ejection fraction) (HCC)   • Pneumonia   • Severe protein-calorie malnutrition (HCC)   • Alkalosis   • Palliative care patient   • End-stage COPD with acute exacerbation (HCC)   • Chronic hypercapnia/KEVIN   • Metabolic encephalopathy     Active issues specifically addressed today include: goals of care, end-stage COPD, chronic KEVIN, chronic hypercapnia, chronic HFrEF, acute on chronic respiratory failure    68M w/ HFrEF, end-stage COPD, multiple admissions for acute respiratory failure. Ange Hudson consulted for goals. PLAN:    1. Goals:   • Patient has expressed his desire to not engage frequently in goals discussions, as he recognizes the severity of his illness.  Unfortunately, he also expressed his desire to have heroic resuscitative measures today so such discussions were necessary - including counseling on risks of harm for limited benefit of CPR, likelihood of needing to remain on a ventilator if intubated, etc.  • Patient accepting of discussion and clearly states that he is amenable to no-CPR status but wants intubation and ventilation if indicated. He would then want his wife to make any decisions about withdrawal of care (this causes his wife some emotional stress, but she states she will follow his wishes). • Palliative will follow for ongoing goals of care discussions as situation evolves. 2. Social Support:  • Supportive listening provided  • Provided anxiety containment  • Advocated for patient/family with interdisciplinary team    3. Symptom management:  • Per primary team.    4. Care Coordination  • Case discussed w/ IM, Pulmonology. 5. Follow-up  • We appreciate the opportunity to participate in this patient's care. • We will continue to follow. [address] Nesha Hudson to follow up Monday 7/24/23. • Please do not hesitate to contact our on-call provider through our clinic answering service at [phone] should there be an acute change or other symptom control concerns. Code status: Level 2 - DNAR: but accepts endotracheal intubation   Decisional apparatus:  Patient does have capacity to make medical decisions on my exam today. If such capacity is lost, patient's substitute decision maker would default to spouse by [place] Act 169. Advance Directive / Living Will / POLST:  Nothing on file. He was provided with the [facility] Advanced Directive at a prior visi. I have reviewed the patient's controlled substance dispensing history in the Prescription Drug Monitoring Program in compliance with the [facility] regulations before prescribing any controlled substances. IDENTIFICATION:  Inpatient consult to Palliative Care  Consult performed by: Sharon Montanez MD  Consult ordered by:  Lamont Reyes, MD      Reason for Consult / Principal Problem: "poor prognosis, hospice appropriate. end stage COPD on continous Bipap, discharged yesterday"    HISTORY OF PRESENT ILLNESS:    Irvin Turpin is a 79 y.o. male with chronic HFrEF (EF 20%), non-ischemic cardiomyopathy, end-stage COPD, chronic respiratory failure w/ hypoxia and hypercapnia on supplemental O2. Patient admitted yesterday after being discharged 7/21/23; admitted for acute on chronic respiratory failure requiring BIPAP. This is his third admission since 6/1/23, fifth admission in six months. Thankfully at time of visit today, he improved to the point he was breathing comfortably on 6LPM via NC.    Ange Crawfordjosefina consulted for goals of care. Our service has engaged with this patient and his wife on multiple occasions in recentmonths, and family has asked that discussions regarding code status and goals be limited (see Dr Fabiano Bob note from 7/20/23). They have been counseled re: patient's poor prognosis and ongoing physical decline. Patient states today he is aware that his prognosis is poor. He confirms today that he does not wish to consider hospice and comfort cares. His code status was changed to [facility] last week, after discussions with the primary team - he makes the decision today to change to LOC2, no CPR but accepts intubation and ventilation. He states he would want his wife to make any future decision about withdrawal of care once intubated (which causes his wife some visible emotional distress). Review of Systems   Constitutional: Positive for activity change, appetite change, fatigue and unexpected weight change. Respiratory: Positive for chest tightness and shortness of breath. Cardiovascular: Positive for leg swelling. Allergic/Immunologic: Positive for immunocompromised state. Psychiatric/Behavioral: Positive for dysphoric mood. The patient is nervous/anxious. All other systems reviewed and are negative.       Past Medical History: Diagnosis Date   • Acute metabolic encephalopathy    • Arthritis    • Bladder mass    • Cardiomyopathy [facility])    • Chest pain    • COPD (chronic obstructive pulmonary disease) (HCC)    • COVID-19 virus infection 2023   • CPAP (continuous positive airway pressure) dependence    • Emphysema of lung (HCC)    • Hypoxia     nocturnal   • Left bundle branch block    • Multiple pulmonary nodules     last assessed: 10/12/16   • Pneumonia    • Sleep apnea    • Sleep apnea, obstructive    • Smoker    • Weight loss 2019     Past Surgical History:   Procedure Laterality Date   • COLONOSCOPY     • CYSTOSCOPY     • CYSTOSCOPY  2021   • AL BLADDER INSTILLATION ANTICARCINOGENIC AGENT N/A 1/3/2020    Procedure: INSTILLATION MITOMYCIN;  Surgeon: Benjy Garber MD;  Location: [facility];  Service: Urology   • AL CYSTO W/REMOVAL OF LESIONS SMALL N/A 1/3/2020    Procedure: TRANSURETHRAL RESECTION OF BLADDER TUMOR (TURBT);   Surgeon: Benjy Garber MD;  Location[facility];  Service: Urology   • AL CYSTOURETHROSCOPY WITH BIOPSY N/A 2021    Procedure: Wilkendra Domingo;  Surgeon: Benjy Garber MD;  Location[facility];  Service: Urology   • AL TRURL ELECTROSURG RESCJ PROSTATE BLEED COMPLETE N/A 2021    Procedure: TRANSURETHRAL RESECTION OF PROSTATE (TURP) BLADDER BIOPSY, FULGURATION;  Surgeon: Benjy Garber MD;  Location: [facility];  Service: Urology     Social History     Socioeconomic History   • Marital status: /Civil Union     Spouse name: Not on file   • Number of children: Not on file   • Years of education: Not on file   • Highest education level: Not on file   Occupational History   • Not on file   Tobacco Use   • Smoking status: Former     Packs/day: 2.50     Years: 42.00     Total pack years: 105.00     Types: Cigarettes     Start date: 5     Quit date:      Years since quittin.5   • Smokeless tobacco: Former   • Tobacco comments:     1 ppd for 37 years, 2010 down to 5 cigs a day, is around second hand smoke   Vaping Use   • Vaping Use: Never used   Substance and Sexual Activity   • Alcohol use: Not Currently     Comment: rarely   • Drug use: No   • Sexual activity: Not Currently     Partners: Female   Other Topics Concern   • Not on file   Social History Narrative    Daily coffee consumption: 8 or more cups a day        Used to work in [employer]. On disability. Social Determinants of Health     Financial Resource Strain: Not on file   Food Insecurity: No Food Insecurity (7/20/2023)    Hunger Vital Sign    • Worried About Running Out of Food in the Last Year: Never true    • Ran Out of Food in the Last Year: Never true   Transportation Needs: No Transportation Needs (7/20/2023)    PRAPARE - Transportation    • Lack of Transportation (Medical): No    • Lack of Transportation (Non-Medical):  No   Physical Activity: Not on file   Stress: Not on file   Social Connections: Not on file   Intimate Partner Violence: Not on file   Housing Stability: Low Risk  (7/20/2023)    Housing Stability Vital Sign    • Unable to Pay for Housing in the Last Year: No    • Number of Places Lived in the Last Year: 1    • Unstable Housing in the Last Year: No     Family History   Problem Relation Age of Onset   • Diabetes Mother        MEDICATIONS / ALLERGIES:  all current active meds have been reviewed and current meds:   Current Facility-Administered Medications   Medication Dose Route Frequency   • budesonide (PULMICORT) inhalation solution 0.5 mg  0.5 mg Nebulization Q12H   • formoterol (PERFOROMIST) nebulizer solution 20 mcg  20 mcg Nebulization Q12H   • furosemide (LASIX) injection 40 mg  40 mg Intravenous BID (diuretic)   • heparin (porcine) subcutaneous injection 5,000 Units  5,000 Units Subcutaneous Q8H [address]   • insulin lispro (HumaLOG) 100 units/mL subcutaneous injection 1-6 Units  1-6 Units Subcutaneous 4x Daily (AC & HS)   • ipratropium (ATROVENT) 0.02 % inhalation solution 0.5 mg  0.5 mg Nebulization TID   • levalbuterol (XOPENEX) inhalation solution 1.25 mg  1.25 mg Nebulization TID   • methylPREDNISolone sodium succinate (Solu-MEDROL) injection 40 mg  40 mg Intravenous Q8H [facility]   • ondansetron (ZOFRAN) injection 4 mg  4 mg Intravenous Q6H PRN       No Known Allergies    OBJECTIVE:  BP 92/56 (BP Location: Right arm)   Pulse 81   Temp 99.3 °F (37.4 °C) (Axillary)   Resp 17   Ht 5' 7" (1.702 m)   Wt 42.1 kg (92 lb 13 oz)   SpO2 98%   BMI 14.54 kg/m²   Nursing notes reviewed. Physical Exam  Vitals reviewed. Constitutional:       General: He is not in acute distress. Appearance: He is cachectic. He is ill-appearing. Interventions: Nasal cannula in place. HENT:      Head: Normocephalic and atraumatic. Right Ear: External ear normal.      Left Ear: External ear normal.   Eyes:      General: No scleral icterus. Right eye: No discharge. Left eye: No discharge. Extraocular Movements: Extraocular movements intact. Conjunctiva/sclera: Conjunctivae normal.      Pupils: Pupils are equal, round, and reactive to light. Cardiovascular:      Rate and Rhythm: Normal rate. Pulmonary:      Effort: Pulmonary effort is normal. No tachypnea, bradypnea, accessory muscle usage or respiratory distress. Comments: Able to speak comfortably in short phrases while receiving supplemental O2 via NC at rest.  Abdominal:      General: Abdomen is flat. There is no distension. Tenderness: There is no guarding. Musculoskeletal:      Cervical back: Normal range of motion. Right lower leg: Edema present. Left lower leg: Edema present. Skin:     General: Skin is dry. Coloration: Skin is not pale. Neurological:      Mental Status: He is alert and oriented to person, place, and time. Cranial Nerves: No dysarthria or facial asymmetry. Psychiatric:         Attention and Perception: He is inattentive (at times). Mood and Affect: Mood is anxious and depressed. Speech: Speech normal.         Behavior: Behavior normal. Behavior is cooperative. Thought Content: Thought content normal.       Lab Results: I have personally reviewed pertinent labs. HEMATOLOGY PROFILE:  Results from last 7 days   Lab Units 07/23/23  0524 07/22/23  1948 07/22/23  1539 07/19/23  0618 07/19/23  0245   WBC Thousand/uL 6.08  --  11.54* 10.45* 11.08*   HEMOGLOBIN g/dL 11.5*  --  12.0 12.6 12.0   HEMATOCRIT % 37.3  --  39.1 37.4 36.1*   PLATELETS Thousands/uL 185 182 216 159 204   NEUTROS PCT %  --   --  94* 94* 79*   MONOS PCT %  --   --  3* 3* 12   EOS PCT %  --   --  0 0 0       CHEMISTRY PROFILE:  Results from last 7 days   Lab Units 07/23/23  0524 07/22/23  1539 07/21/23  0442 07/20/23  0516   SODIUM mmol/L 131* 129* 131* 126*   POTASSIUM mmol/L 4.9 5.2 3.7 3.5   CHLORIDE mmol/L 81* 78* 80* 73*   CO2 mmol/L >45* >45* >45* >45*   BUN mg/dL 22 22 21 19   CREATININE mg/dL 0.67 0.77 0.70 0.78   ALBUMIN g/dL 3.4* 3.7  --  3.2*   CALCIUM mg/dL 9.5 9.5 9.3 9.0   ALK PHOS U/L 56 65  --  57   ALT U/L 25 27  --  20   AST U/L 31 34  --  31       Albumin:  0   Lab Value Date/Time    ALB 3.4 (L) 07/23/2023 0524    ALB 3.9 08/17/2015 0756     Imaging Studies: I have personally reviewed pertinent reports. EKG, Pathology, and Other Studies: I have personally reviewed pertinent reports. Counseling / Coordination of Care: Total floor / unit time spent today 45 minutes. Greater than 50% of total time was spent with the patient and / or family counseling and / or coordination of care. A description of the counseling / coordination of care: symptom assessment and management, medication review, psychosocial support, chart review, imaging review, lab review, advanced directives, goals of care, supportive listening and anticipatory guidance.     Mary Ann Barry MD  [facility] Palliative and Supportive [facility]  [phone]    Portions of this document may have been created using dictation software and as such some "sound alike" terms may have been generated by the system. Do not hesitate to contact me with any questions or clarifications. This note was not shared with the patient due to privacy exception: note includes other individuals.

## 2023-07-23 NOTE — CONSULTS
PULMONOLOGY CONSULT NOTE     Name: Stanley Jiemnes Age & Sex: 79 y.o. 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 BiPAP qHS and PRN  OK to hold off on antibiotics given recent course. Diuresis per primary team. Consider adding diamox. Ongoing goals of care discussions. I discussed that hospice is most appropriate. I discussed that beyond his current therapy the only other measure to treat his dyspnea would require hospice care. Discussed with KARLENE via [employer]      History of Present Illness   HPI:  Stanley Jimenes is a 79 y.o. male with PMHx very severe COPD with chronic hypoxemic/hypercapnic respiratory failure, Severe chronic systolic heart failure NYHA 4, Pulmonary cachexia, underweight BMI 14.5, who presents with shortness of breath 1 day after being discharged. He had sudden worsening of his dyspnea yesterday. He was hypoxemic. He was brought back to the hospital. No fevers/chills. No productive cough. He was treated with oxygen, BiPAP, Steroids, bronchodilators, and diuretics. Today he is still short of breath with minimal exertion. He is on 6 lpm of O2 with SPO2 98%. This can be decreased. He reports he knows he is approaching the end of his life. He is struggling with a decision about transitioning to hospice. As of now despite counseling that it would not provide any long-term benefit he is accepting intubation if required.   Conversations have been had multiple times with the patient, his wife, pulmonary consult team, and palliative care over multiple admissions. Review of systems:  12 point review of systems was completed and was otherwise negative except as listed in HPI. Historical Information   Past Medical History:   Diagnosis Date   • Acute metabolic encephalopathy 4/70/6563   • Arthritis    • Bladder mass    • Cardiomyopathy [facility])    • Chest pain    • COPD (chronic obstructive pulmonary disease) ([facility])    • COVID-19 virus infection 2/23/2023   • CPAP (continuous positive airway pressure) dependence    • Emphysema of lung (HCC)    • Hypoxia     nocturnal   • Left bundle branch block    • Multiple pulmonary nodules     last assessed: 10/12/16   • Pneumonia    • Sleep apnea    • Sleep apnea, obstructive    • Smoker    • Weight loss 8/29/2019     Past Surgical History:   Procedure Laterality Date   • COLONOSCOPY     • CYSTOSCOPY     • CYSTOSCOPY  02/17/2021   • FL BLADDER INSTILLATION ANTICARCINOGENIC AGENT N/A 1/3/2020    Procedure: INSTILLATION MITOMYCIN;  Surgeon: Terry Kearns MD;  Location: [facility];  Service: Urology   • FL CYSTO W/REMOVAL OF LESIONS SMALL N/A 1/3/2020    Procedure: TRANSURETHRAL RESECTION OF BLADDER TUMOR (TURBT);   Surgeon: Terry Kearns MD;  [facility];  Service: Urology   • FL CYSTOURETHROSCOPY WITH BIOPSY N/A 4/13/2021    Procedure: Thera Plate;  Surgeon: Terry Kearns MD;  [facility];  Service: Urology   • FL TRURL ELECTROSURG RESCJ PROSTATE BLEED COMPLETE N/A 4/13/2021    Procedure: TRANSURETHRAL RESECTION OF PROSTATE (TURP) BLADDER BIOPSY, FULGURATION;  Surgeon: Terry Kearns MD;  [facility];  Service: Urology     Family History   Problem Relation Age of Onset   • Diabetes Mother          Social History:   Social History     Tobacco Use   Smoking Status Former   • Packs/day: 2.50   • Years: 42.00   • Total pack years: 105.00   • Types: Cigarettes   • Start date: 1970   • Quit date:    • Years since quittin.5   Smokeless Tobacco Former   Tobacco Comments    1 ppd for 37 years, 2010 down to 5 cigs a day, is around second hand smoke         Meds/Allergies   Current Facility-Administered Medications   Medication Dose Route Frequency   • budesonide (PULMICORT) inhalation solution 0.5 mg  0.5 mg Nebulization Q12H   • formoterol (PERFOROMIST) nebulizer solution 20 mcg  20 mcg Nebulization Q12H   • furosemide (LASIX) injection 40 mg  40 mg Intravenous BID (diuretic)   • heparin (porcine) subcutaneous injection 5,000 Units  5,000 Units Subcutaneous Q8H [address]   • insulin lispro (HumaLOG) 100 units/mL subcutaneous injection 1-6 Units  1-6 Units Subcutaneous 4x Daily (AC & HS)   • ipratropium (ATROVENT) 0.02 % inhalation solution 0.5 mg  0.5 mg Nebulization TID   • levalbuterol (XOPENEX) inhalation solution 1.25 mg  1.25 mg Nebulization TID   • methylPREDNISolone sodium succinate (Solu-MEDROL) injection 40 mg  40 mg Intravenous Q8H [address]   • ondansetron (ZOFRAN) injection 4 mg  4 mg Intravenous Q6H PRN     Medications Prior to Admission   Medication   • acetaminophen (TYLENOL) 325 mg tablet   • albuterol (PROVENTIL HFA,VENTOLIN HFA) 90 mcg/act inhaler   • aspirin 81 mg chewable tablet   • [] azithromycin (ZITHROMAX) 500 MG tablet   • budesonide (PULMICORT) 0.5 mg/2 mL nebulizer solution   • chlorhexidine (PERIDEX) 0.12 % solution   • cyanocobalamin (VITAMIN B-12) 1000 MCG tablet   • Diclofenac Sodium (VOLTAREN) 1 %   • docusate sodium (COLACE) 100 mg capsule   • ergocalciferol (VITAMIN D2) 50,000 units   • famotidine (PEPCID) 20 mg tablet   • formoterol (PERFOROMIST) 20 MCG/2ML nebulizer solution   • guaiFENesin (MUCINEX) 600 mg 12 hr tablet   • ipratropium-albuterol (DUO-NEB) 0.5-2.5 mg/3 mL nebulizer solution   • Melatonin 5 MG TABS   • potassium chloride (K-DUR,KLOR-CON) 20 mEq tablet   • predniSONE 10 mg tablet   • [START ON 2023] predniSONE 5 mg tablet   • rosuvastatin (CRESTOR) 10 MG tablet   • senna (SENOKOT) 8.6 mg   • sodium chloride (OCEAN) 0.65 % nasal spray   • tamsulosin (FLOMAX) 0.4 mg   • torsemide (DEMADEX) 10 mg tablet     No Known Allergies    Vitals: Blood pressure 92/56, pulse 81, temperature 99.3 °F (37.4 °C), temperature source Axillary, resp. rate 17, height 5' 7" (1.702 m), weight 42.1 kg (92 lb 13 oz), SpO2 98 %. , 6 lpm, Body mass index is 14.54 kg/m². Intake/Output Summary (Last 24 hours) at 7/23/2023 1225  Last data filed at 7/23/2023 1217  Gross per 24 hour   Intake 300 ml   Output 535 ml   Net -235 ml       Physical Exam  Vitals and nursing note reviewed. Constitutional:       General: He is in acute distress. Appearance: He is well-developed. He is ill-appearing. Comments: Cachectic, temporal wasting   HENT:      Head: Atraumatic. Nose: Nose normal. No congestion. Mouth/Throat:      Mouth: Mucous membranes are moist.   Eyes:      Conjunctiva/sclera: Conjunctivae normal.   Cardiovascular:      Rate and Rhythm: Normal rate and regular rhythm. Heart sounds: No murmur heard. Pulmonary:      Effort: Respiratory distress present. Comments: Accessory muscle use at rest  Tachypnic  Distant breath sounds  Abdominal:      Palpations: Abdomen is soft. Tenderness: There is no abdominal tenderness. Musculoskeletal:         General: No swelling. Cervical back: Neck supple. Right lower leg: No edema. Left lower leg: No edema. Skin:     General: Skin is warm and dry. Capillary Refill: Capillary refill takes less than 2 seconds. Neurological:      General: No focal deficit present. Mental Status: He is alert. Psychiatric:         Mood and Affect: Mood normal.         Labs: I have personally reviewed pertinent lab results.   Laboratory and Diagnostics  Results from last 7 days   Lab Units 07/23/23  0524 07/22/23  1948 07/22/23  1539 07/19/23  0618 07/19/23  0245   WBC Thousand/uL 6.08  --  11.54* 10.45* 11.08* HEMOGLOBIN g/dL 11.5*  --  12.0 12.6 12.0   HEMATOCRIT % 37.3  --  39.1 37.4 36.1*   PLATELETS Thousands/uL 185 182 216 159 204   NEUTROS PCT %  --   --  94* 94* 79*   MONOS PCT %  --   --  3* 3* 12   EOS PCT %  --   --  0 0 0     Results from last 7 days   Lab Units 07/23/23  0524 07/22/23  1539 07/21/23  0442 07/20/23  0516 07/19/23  1225 07/19/23  0245   SODIUM mmol/L 131* 129* 131* 126* 123* 123*   POTASSIUM mmol/L 4.9 5.2 3.7 3.5 3.3* 3.3*   CHLORIDE mmol/L 81* 78* 80* 73* 70* 71*   CO2 mmol/L >45* >45* >45* >45* >45* >45*   BUN mg/dL 22 22 21 19 14 16   CREATININE mg/dL 0.67 0.77 0.70 0.78 0.76 0.77   CALCIUM mg/dL 9.5 9.5 9.3 9.0 9.0 8.8   GLUCOSE RANDOM mg/dL 125 292* 202* 116 243* 144*   ALT U/L 25 27  --  20  --  23   AST U/L 31 34  --  31  --  43   ALK PHOS U/L 56 65  --  57  --  64   ALBUMIN g/dL 3.4* 3.7  --  3.2*  --  3.5   TOTAL BILIRUBIN mg/dL 0.70 0.72  --  0.75  --  0.82     Results from last 7 days   Lab Units 07/20/23  0516   MAGNESIUM mg/dL 2.3      Results from last 7 days   Lab Units 07/19/23  0618   INR  0.78*   PTT seconds 19*                          Results from last 7 days   Lab Units 07/22/23  1539   D-DIMER QUANTITATIVE ug/ml FEU <0.27     Results from last 7 days   Lab Units 07/20/23  0516 07/19/23  0828   PROCALCITONIN ng/ml 0.06 0.05       Microbiology:        ABG: No results found for: "PHART", "LCN5FXZ", "PO2ART", "TBH5IVS", "M1NCTMXU", "BEART", "SOURCE"      Imaging and other studies: I have personally reviewed pertinent reports. and I have personally reviewed pertinent films in PACS     XR chest portable  Result Date: 7/23/2023  Impression: Emphysema with mild pulmonary venous congestion. CT Chest 12/21/22  IMPRESSION:  1. Noncalcified nodules in the right upper and lower lobe without significant change. 2.  Mild interval improvement of the ill-defined area of nodularity/reticulation in the left lower lobe as compared to the prior study.   3.  Centrilobular and paraseptal emphysema. 4.  Possible worsening fibrotic changes in the central aspect of the right middle lobe with bronchiectasis. 5.  Increased loss of height of multiple thoracic vertebral bodies primarily involving the T6-T8 vertebral bodies. If there is pain and/or tenderness in this region, consider further assessment with MRI. Pulmonary function testing:   PFT 2020  Results:  FEV1/FVC Ratio: 30 %  Forced Vital Capacity: 2.00 L    59 % predicted  FEV1: 0.60 L     22 % predicted        After administration of bronchodilator   FVC: 2.43 L, 72 % predicted, 21 % change  FEV1: 0.66 L, 25 % predicted, 11 % change     Lung volumes by body plethysmography:   Total Lung Capacity 124 % predicted   Residual volume 243 % predicted     DLCO corrected for patients hemoglobin level: 45 %     Interpretation:     • Very severe obstructive airflow defect on spirometry     • Significant improvement in airflow or forced vital capacity in response to the administration of bronchodilator per ATS standards.     • Air trapping and hyperinflation as indicated by the lung volumes     • Moderate decrease in diffusion capacity     • Flow-volume loop consistent with obstruction        6 minute walk test   The patient was 95% on room air at rest and resting HR was 95bpm with dyspnea scale of 4/10. He completed 274m of walking with the lowest saturation at 87% requiring 2lpm of oxygen for which the lowest O2 saturation was 90%. End of test HR was 129bpm and dyspnea scale was 9/10.         EKG, Pathology, and Other Studies: I have personally reviewed pertinent reports. Echo 5/1/23  •  Left Ventricle: Left ventricular cavity size is mildly dilated. Wall thickness is normal. The left ventricular ejection fraction is 20%. Systolic function is severely reduced. There is severe global hypokinesis with regional variation. •  IVS: There is abnormal septal motion consistent with left bundle branch block.  There is diastolic flattening of the interventricular septum consistent with right ventricle volume overload. •  Right Ventricle: Right ventricular cavity size is normal. Systolic function is normal.  •  Aorta: The aortic root is mildly dilated. The aortic root is 3.60 cm. •  Prior TTE study available for comparison. Prior study date: 2/21/2023. Changes noted when compared to prior study.  Changes include: Compared to previous TTE, LVEF has now reduced to 20      Code Status: Level 2 - DNAR: but accepts endotracheal intubation        Vaughn Rose MD  Attending Physician  Pulmonary and [address]

## 2023-07-23 NOTE — MALNUTRITION/BMI
This medical record reflects one or more clinical indicators suggestive of malnutrition and/or morbid obesity. Malnutrition Findings:   Adult Malnutrition type: Chronic illness  Adult Degree of Malnutrition: Other severe protein calorie malnutrition  Malnutrition Characteristics: Fat loss, Muscle loss, Inadequate energy, Weight loss                  360 Statement: Severe protein/calorie malnutrition r/t catabolic illness as evidenced by 13% unplanned weight loss since 1/23/23, consuming < 75% energy intake compared to estimated energy needs, fat/muscle depletion of orbital/temple areas, BMI 14.5. Treated with Ensure Compact bid    BMI Findings:  Adult BMI Classifications: Underweight < 18.5        Body mass index is 14.54 kg/m². See Nutrition note dated 7/23/23 for additional details. Completed nutrition assessment is viewable in the nutrition documentation.

## 2023-07-23 NOTE — PLAN OF CARE
Problem: Potential for Falls  Goal: Patient will remain free of falls  Description: INTERVENTIONS:  - Educate patient/family on patient safety including physical limitations  - Instruct patient to call for assistance with activity   - Consult OT/PT to assist with strengthening/mobility   - Keep Call bell within reach  - Keep bed low and locked with side rails adjusted as appropriate  - Keep care items and personal belongings within reach  - Initiate and maintain comfort rounds  - Make Fall Risk Sign visible to staff  Problem: MOBILITY - ADULT  Goal: Maintain or return to baseline ADL function  Description: INTERVENTIONS:  -  Assess patient's ability to carry out ADLs; assess patient's baseline for ADL function and identify physical deficits which impact ability to perform ADLs (bathing, care of mouth/teeth, toileting, grooming, dressing, etc.)  - Assess/evaluate cause of self-care deficits   - Assess range of motion  - Assess patient's mobility; develop plan if impaired  - Assess patient's need for assistive devices and provide as appropriate  - Encourage maximum independence but intervene and supervise when necessary  - Involve family in performance of ADLs  - Assess for home care needs following discharge   - Consider OT consult to assist with ADL evaluation and planning for discharge  - Provide patient education as appropriate  Outcome: Progressing  Goal: Maintains/Returns to pre admission functional level  Description: INTERVENTIONS:  - Perform BMAT or MOVE assessment daily.   - Set and communicate daily mobility goal to care team and patient/family/caregiver.    - Collaborate with rehabilitation services on mobility goals if consulted  Problem: Prexisting or High Potential for Compromised Skin Integrity  Goal: Skin integrity is maintained or improved  Description: INTERVENTIONS:  - Identify patients at risk for skin breakdown  - Assess and monitor skin integrity  - Assess and monitor nutrition and hydration status  - Monitor labs   - Assess for incontinence   - Turn and reposition patient  - Assist with mobility/ambulation  - Relieve pressure over bony prominences  - Avoid friction and shearing  - Provide appropriate hygiene as needed including keeping skin clean and dry  - Evaluate need for skin moisturizer/barrier cream  - Collaborate with interdisciplinary team   - Patient/family teaching  - Consider wound care consult   Outcome: Progressing     - Out of bed for toileting  - Record patient progress and toleration of activity level   Outcome: Progressing     - Apply yellow socks and bracelet for high fall risk patients  - Consider moving patient to room near nurses station  Outcome: Progressing

## 2023-07-23 NOTE — ASSESSMENT & PLAN NOTE
Wt Readings from Last 3 Encounters:   07/23/23 42.1 kg (92 lb 13 oz)   07/21/23 44.5 kg (98 lb 3.2 oz)   06/26/23 43.8 kg (96 lb 8 oz)     · Last echocardiogram from 5/2023 shows LVEF 20%  · Discharged on 7/21 after episode of acute on chronic HFrEF with increase in Torsemide from 20 to 30 mg  · On IV Lasix 40 mg BID  · Cardiology consult  · Monitor I/O, daily weights

## 2023-07-23 NOTE — RESPIRATORY THERAPY NOTE
07/23/23 0716   Respiratory Assessment   Assessment Type During-treatment   General Appearance Sleeping   Respiratory Pattern Assisted; Tachypneic   Chest Assessment Chest expansion symmetrical   Bilateral Breath Sounds Diminished   Resp Comments Recieved on continuous BIPAP where pt remains. Bilateal BS diminshed.  Treatments given as sceduled   O2 Device bipap   Additional Assessments   Pulse 79   Respirations (!) 28   SpO2 96 %   Position Semi-[employer]

## 2023-07-24 ENCOUNTER — TELEPHONE (OUTPATIENT)
Dept: NEPHROLOGY | Facility: CLINIC | Age: 68
End: 2023-07-24

## 2023-07-24 ENCOUNTER — TRANSITIONAL CARE MANAGEMENT (OUTPATIENT)
Dept: INTERNAL MEDICINE CLINIC | Facility: CLINIC | Age: 68
End: 2023-07-24

## 2023-07-24 ENCOUNTER — PATIENT OUTREACH (OUTPATIENT)
Dept: CARDIOLOGY CLINIC | Facility: CLINIC | Age: 68
End: 2023-07-24

## 2023-07-24 ENCOUNTER — DOCUMENTATION (OUTPATIENT)
Dept: INTERNAL MEDICINE CLINIC | Facility: CLINIC | Age: 68
End: 2023-07-24

## 2023-07-24 DIAGNOSIS — E87.1 HYPONATREMIA: Primary | ICD-10-CM

## 2023-07-24 LAB
BUN SERPL-MCNC: 28 MG/DL (ref 5–25)
CALCIUM SERPL-MCNC: 9 MG/DL (ref 8.3–10.1)
CHLORIDE SERPL-SCNC: 79 MMOL/L (ref 96–108)
CO2 SERPL-SCNC: >45 MMOL/L (ref 21–32)
CREAT SERPL-MCNC: 0.78 MG/DL (ref 0.6–1.3)
GFR SERPL CREATININE-BSD FRML MDRD: 94 ML/MIN/1.73SQ M
GLUCOSE SERPL-MCNC: 119 MG/DL (ref 65–140)
GLUCOSE SERPL-MCNC: 146 MG/DL (ref 65–140)
GLUCOSE SERPL-MCNC: 232 MG/DL (ref 65–140)
GLUCOSE SERPL-MCNC: 289 MG/DL (ref 65–140)
GLUCOSE SERPL-MCNC: 319 MG/DL (ref 65–140)
MAGNESIUM SERPL-MCNC: 2.3 MG/DL (ref 1.6–2.6)
POTASSIUM SERPL-SCNC: 4 MMOL/L (ref 3.5–5.3)
SODIUM SERPL-SCNC: 131 MMOL/L (ref 135–147)

## 2023-07-24 PROCEDURE — 82948 REAGENT STRIP/BLOOD GLUCOSE: CPT

## 2023-07-24 PROCEDURE — 94640 AIRWAY INHALATION TREATMENT: CPT

## 2023-07-24 PROCEDURE — 94002 VENT MGMT INPAT INIT DAY: CPT

## 2023-07-24 PROCEDURE — 99233 SBSQ HOSP IP/OBS HIGH 50: CPT | Performed by: INTERNAL MEDICINE

## 2023-07-24 PROCEDURE — 99232 SBSQ HOSP IP/OBS MODERATE 35: CPT | Performed by: INTERNAL MEDICINE

## 2023-07-24 PROCEDURE — 94664 DEMO&/EVAL PT USE INHALER: CPT

## 2023-07-24 PROCEDURE — 83735 ASSAY OF MAGNESIUM: CPT | Performed by: INTERNAL MEDICINE

## 2023-07-24 PROCEDURE — 80048 BASIC METABOLIC PNL TOTAL CA: CPT | Performed by: INTERNAL MEDICINE

## 2023-07-24 PROCEDURE — 94760 N-INVAS EAR/PLS OXIMETRY 1: CPT

## 2023-07-24 PROCEDURE — 99223 1ST HOSP IP/OBS HIGH 75: CPT | Performed by: INTERNAL MEDICINE

## 2023-07-24 RX ORDER — TORSEMIDE 20 MG/1
40 TABLET ORAL DAILY
Status: DISCONTINUED | OUTPATIENT
Start: 2023-07-25 | End: 2023-07-29 | Stop reason: HOSPADM

## 2023-07-24 RX ORDER — BISACODYL 10 MG
10 SUPPOSITORY, RECTAL RECTAL ONCE
Status: COMPLETED | OUTPATIENT
Start: 2023-07-24 | End: 2023-07-24

## 2023-07-24 RX ORDER — SENNOSIDES 8.6 MG
1 TABLET ORAL
Status: DISCONTINUED | OUTPATIENT
Start: 2023-07-24 | End: 2023-07-29 | Stop reason: HOSPADM

## 2023-07-24 RX ORDER — METHYLPREDNISOLONE SODIUM SUCCINATE 40 MG/ML
40 INJECTION, POWDER, LYOPHILIZED, FOR SOLUTION INTRAMUSCULAR; INTRAVENOUS EVERY 12 HOURS SCHEDULED
Status: COMPLETED | OUTPATIENT
Start: 2023-07-24 | End: 2023-07-27

## 2023-07-24 RX ORDER — DOCUSATE SODIUM 100 MG/1
100 CAPSULE, LIQUID FILLED ORAL 2 TIMES DAILY
Status: DISCONTINUED | OUTPATIENT
Start: 2023-07-24 | End: 2023-07-29 | Stop reason: HOSPADM

## 2023-07-24 RX ADMIN — IPRATROPIUM BROMIDE 0.5 MG: 0.5 SOLUTION RESPIRATORY (INHALATION) at 13:57

## 2023-07-24 RX ADMIN — HEPARIN SODIUM 5000 UNITS: 5000 INJECTION INTRAVENOUS; SUBCUTANEOUS at 14:55

## 2023-07-24 RX ADMIN — INSULIN LISPRO 3 UNITS: 100 INJECTION, SOLUTION INTRAVENOUS; SUBCUTANEOUS at 08:07

## 2023-07-24 RX ADMIN — HEPARIN SODIUM 5000 UNITS: 5000 INJECTION INTRAVENOUS; SUBCUTANEOUS at 05:08

## 2023-07-24 RX ADMIN — BUDESONIDE 0.5 MG: 0.5 INHALANT ORAL at 19:35

## 2023-07-24 RX ADMIN — INSULIN LISPRO 4 UNITS: 100 INJECTION, SOLUTION INTRAVENOUS; SUBCUTANEOUS at 11:05

## 2023-07-24 RX ADMIN — ONDANSETRON 4 MG: 2 INJECTION INTRAMUSCULAR; INTRAVENOUS at 13:31

## 2023-07-24 RX ADMIN — DOCUSATE SODIUM 100 MG: 100 CAPSULE ORAL at 17:47

## 2023-07-24 RX ADMIN — IPRATROPIUM BROMIDE 0.5 MG: 0.5 SOLUTION RESPIRATORY (INHALATION) at 19:37

## 2023-07-24 RX ADMIN — LEVALBUTEROL HYDROCHLORIDE 1.25 MG: 1.25 SOLUTION RESPIRATORY (INHALATION) at 13:57

## 2023-07-24 RX ADMIN — BISACODYL 10 MG: 10 SUPPOSITORY RECTAL at 17:42

## 2023-07-24 RX ADMIN — HEPARIN SODIUM 5000 UNITS: 5000 INJECTION INTRAVENOUS; SUBCUTANEOUS at 22:48

## 2023-07-24 RX ADMIN — FORMOTEROL FUMARATE DIHYDRATE 20 MCG: 20 SOLUTION RESPIRATORY (INHALATION) at 19:35

## 2023-07-24 RX ADMIN — METHYLPREDNISOLONE SODIUM SUCCINATE 40 MG: 40 INJECTION, POWDER, FOR SOLUTION INTRAMUSCULAR; INTRAVENOUS at 22:30

## 2023-07-24 RX ADMIN — LEVALBUTEROL HYDROCHLORIDE 1.25 MG: 1.25 SOLUTION RESPIRATORY (INHALATION) at 19:37

## 2023-07-24 RX ADMIN — FUROSEMIDE 40 MG: 10 INJECTION, SOLUTION INTRAMUSCULAR; INTRAVENOUS at 08:07

## 2023-07-24 RX ADMIN — METHYLPREDNISOLONE SODIUM SUCCINATE 40 MG: 40 INJECTION, POWDER, FOR SOLUTION INTRAMUSCULAR; INTRAVENOUS at 05:08

## 2023-07-24 NOTE — QUICK NOTE
I was contacted by nursing staff due to patient's wife requesting transitioning patient back to BiPAP. He is currently on HFNC 50L/40% with SpO2 93%. Patient appears comfortable with this setting. He states he was feeling nauseous earlier but feels better now. He wants to remain on HFNC until later tonight. Chart reviewed. Discussed with nursing and RT as well as patient and wife. Will continue HFNC for now and switch to BiPAP at bedtime. Patient advised to inform staff if he becomes nauseous again. Patient and wife agreeable with the plan.      Cem Ann MD  Pulmonary and Critical Care Fellow, PGY-5  [facility]

## 2023-07-24 NOTE — PROGRESS NOTES
Progress note - Palliative and Supportive Care   Meron Delacruz. 79 y.o. male [id number]    Patient Active Problem List   Diagnosis   • Nonobstructive atherosclerosis of coronary artery   • Nonischemic cardiomyopathy [facility])   • Chronic obstructive pulmonary disease with acute exacerbation (HCC)   • Left bundle-branch block   • KEVIN and COPD overlap syndrome (HCC)   • Gastroesophageal reflux disease   • Prediabetes   • Osteoarthritis   • Osteoporosis   • Vitamin D deficiency   • Mood disorder (HCC)   • Other insomnia   • Macrocytosis without anemia   • Chronic respiratory failure with hypoxia and hypercapnia (HCC)   • Goals of care, counseling/discussion   • BPH with obstruction/lower urinary tract symptoms   • Prostate cancer ([address])   • Acute on chronic respiratory failure with hypoxia (HCC)   • Pulmonary nodules/lesions, multiple   • Protein-calorie malnutrition (HCC)   • Chronic HFrEF (heart failure with reduced ejection fraction) (HCC)   • Chronic anemia   • Hyponatremia   • Physical deconditioning   • Hyperglycemia   • COPD exacerbation (HCC)   • HLD (hyperlipidemia)   • Moderate protein-calorie malnutrition (HCC)   • Acute on chronic HFrEF (heart failure with reduced ejection fraction) (HCC)   • Pneumonia   • Severe protein-calorie malnutrition (HCC)   • Alkalosis   • Palliative care patient   • End-stage COPD with acute exacerbation (HCC)   • Chronic hypercapnia/KEVIN   • Metabolic encephalopathy     Active issues specifically addressed today include:   goals of care, end-stage COPD, chronic KEVIN, chronic hypercapnia, chronic HFrEF, acute on chronic respiratory failure    Plan:  1. Symptom management -    - per primary. 2. Goals - recurrent goals conversation held with patient, spouse and son (with their permission) at bedside. Though he repeatedly expresses anger, frustration, and a general desire for his life to come to an end he is not accepting of a comfort care plan.  This behavior is consistent with previous conversations. I discussed with bedside RN, RN mgmt, provider team that we should not belabor these conversations. Rather, as he is not accepting end of life cares, would work to medically optimize, but also clearly define limits of what we will offer. Medically I do believe that if he were intubated again he would likely not be liberated from the ventilator. Code Status: level 2   Decisional apparatus:  Patient is marginally competent on my exam today. If competence is lost, patient's substitute decision maker would default to spouse  by [place] Act 169. Interval history:       Amarilys Rangel reports feeling as well, or unwell, as he usually does. Endorses frustration with his declining functional status, endorses dyspnea, endorses frustration regarding being in the hospital.      [facility] / ALLERGIES:     all current active meds have been reviewed and current meds:   Current Facility-Administered Medications   Medication Dose Route Frequency   • budesonide (PULMICORT) inhalation solution 0.5 mg  0.5 mg Nebulization Q12H   • formoterol (PERFOROMIST) nebulizer solution 20 mcg  20 mcg Nebulization Q12H   • furosemide (LASIX) injection 40 mg  40 mg Intravenous BID (diuretic)   • heparin (porcine) subcutaneous injection 5,000 Units  5,000 Units Subcutaneous Q8H [facility]   • insulin lispro (HumaLOG) 100 units/mL subcutaneous injection 1-6 Units  1-6 Units Subcutaneous 4x Daily (AC & HS)   • ipratropium (ATROVENT) 0.02 % inhalation solution 0.5 mg  0.5 mg Nebulization TID   • levalbuterol (XOPENEX) inhalation solution 1.25 mg  1.25 mg Nebulization TID   • methylPREDNISolone sodium succinate (Solu-MEDROL) injection 40 mg  40 mg Intravenous Q8H [facility]   • ondansetron (ZOFRAN) injection 4 mg  4 mg Intravenous Q6H PRN       No Known Allergies    OBJECTIVE:    Physical Exam  Physical Exam  Vitals and nursing note reviewed. Constitutional:       General: He is in acute distress. Appearance: He is ill-appearing.  He is not toxic-appearing or diaphoretic. HENT:      Head: Atraumatic. Right Ear: External ear normal.      Left Ear: External ear normal.      Nose:      Comments: NC  Eyes:      General:         Right eye: No discharge. Left eye: No discharge. Cardiovascular:      Rate and Rhythm: Normal rate. Pulmonary:      Comments: Labored at rest  Abdominal:      General: There is no distension. Comments: scaphoid   Musculoskeletal:         General: Deformity (diffuse substantial muscle wasting) present. Skin:     General: Skin is dry. Coloration: Skin is pale. Neurological:      Mental Status: He is alert. Comments: Alert and oriented to self and overall situation   Psychiatric:      Comments: Frustrated, irritable,           Lab Results:   I have personally reviewed pertinent labs. , CBC: No results found for: "WBC", "HGB", "HCT", "MCV", "PLT", "ADJUSTEDWBC", "RBC", "MCH", "MCHC", "RDW", "MPV", "NRBC", CMP:   Lab Results   Component Value Date    SODIUM 131 (L) 07/24/2023    K 4.0 07/24/2023    CL 79 (L) 07/24/2023    CO2 >45 (HH) 07/24/2023    BUN 28 (H) 07/24/2023    CREATININE 0.78 07/24/2023    CALCIUM 9.0 07/24/2023    EGFR 94 07/24/2023   , BMP:  Lab Results   Component Value Date    SODIUM 131 (L) 07/24/2023    K 4.0 07/24/2023    CL 79 (L) 07/24/2023    CO2 >45 (HH) 07/24/2023    BUN 28 (H) 07/24/2023    CREATININE 0.78 07/24/2023    GLUC 319 (H) 07/24/2023    CALCIUM 9.0 07/24/2023    AGAP  07/24/2023      Comment:      Unable to calculate    EGFR 94 07/24/2023         Counseling / Coordination of Care    Total floor / unit time spent today 40 minutes (10:30-11:00). Greater than 50% of total time was spent with the patient and / or family counseling and / or coordination of care.  A description of the counseling / coordination of care: goals of care, code status confirmation, support, discussion with interdisciplinary team

## 2023-07-24 NOTE — TELEPHONE ENCOUNTER
----- Message from [id number]  89[facility] sent at 7/21/2023  3:07 PM EDT -----  Hospital follow up. BMP in 5-7 days. Follow up for hyponatremia. Thx. Nonurgent. May need telemed visit.

## 2023-07-24 NOTE — RESPIRATORY THERAPY NOTE
Resp Care Note         07/24/23 1424   Respiratory Assessment   Resp Comments Set up HFNC to replace [address] and BiPAP per Dr. Holley Bloom doing teaching rounds. Pt had nausea and vomiting issues post lunch. HFNC instituted for CO2 removal more than for FiO2 enrichment. NOTE:  Dr. Holley Bloom wants pt to try to use BiPAP HS and if not tolerated (still nauseated / vomiting) return to the HFNC with low FiO2 settings and high flow settings. Afternoon breathing Rx given through [employer] via BiPAP prior to HFNC conversion.    O2 Device HFNC   Non-Invasive Information   O2 Interface Device HFNC prongs   Non-Invasive Ventilation Mode HFNC (High flow)   $ Continous NIV Initial   SpO2 94 %   $ Pulse Oximetry Spot Check Charge Completed   Non-Invasive Settings   FiO2 (%) 31.8   Flow (lpm) 50   Humidification   (heater)   Temperature (Set) 31

## 2023-07-24 NOTE — PROGRESS NOTES
Patient is admitted to [address] Outpatient Advanced Heart Failure LCSW completed chart review and rounded with the HF Team. Spouse at pt's bedside. Pt expressed concern over medication co-pays. Pt has [facility] and Medicare A/B.     LCSW provided printed info about [employer] and spouse stated that they earn under the income limit. Spouse intends to call [address] [facility] and apply for medication co-pay assistance program.     LCSW available for outpatient resources.

## 2023-07-24 NOTE — CONSULTS
Consult received and appreciated. Clinical nutrition is currently following pt. Please refer to full nutrition assessment and malnutrition note added by RD on 7/23/23.

## 2023-07-24 NOTE — CONSULTS
Advanced Heart Failure / Pulmonary Hypertension Service Consultation    Cosme Suarez. 79 y.o. male  MRN: [id number]  Unit/Bed#: [facility] 525-01; Encounter: [id number]    Assessment:  Principal Problem:    End-stage COPD with acute exacerbation ([address])  Active Problems:    Prediabetes    Goals of care, counseling/discussion    Prostate cancer ([address])    Acute on chronic respiratory failure with hypoxia (HCC)    Chronic HFrEF (heart failure with reduced ejection fraction) (HCC)    Hyponatremia    Hyperglycemia    Severe protein-calorie malnutrition (HCC)    Chronic hypercapnia/KEVIN      HPI:   Cosme Bateman is a 70-year-old man with PMH as below who presented to [facility] on 07/22/2023 with SOB after being discharged >24 hours before (for admission for COPD and HF exacerbations). Patient seen and examined. Family at bedside. Patient's primary concern today is about cost of medications and require medication changes over past few weeks. Heart failure service was consulted for "acute on chronic HFrEF." Patient follows with Augusta Sorensen for outpatient cardiology. Objective: Intake/ Output:  Weight: 94 lbs (92 lbs on 07/23). Both bed weights. Telemetry: ST, rates in 100-110s. Tends to run bradycardic in PM (40-50s). Today's Plan:  • Ongoing [facility] discussions led by palliative and pulmonology teams. • Switch to PO torsemide starting tomorrow AM; not in acute HF exacerbation. Will continue to monitor volume status while inpatient. • Not a candidate for any advanced HF therapies. Plan:  End-stage COPD with acute exacerbation / chronic respiratory failure with hypoxia and hypercapnia   Management per pulmonology and primary teams. Chronic HFrEF; LVEF 20%; LVIDd 5.4 cm; NYHA III; ACC/AHA Stage C   Etiology: nonischemic. Verlena Minder Verlena Minder "Possible dysynchrony from the chronic LBBB. Despite QRS only being 120ms, the echo shows significant dysynchrony."   TTE 05/01/2023: LVEF 20%.  LVIDd 5.4 cm. Severe global hypokinesis. Normal RV. Trace MR and TR. Normal IVC. Pharmacotherapies / Neurohormonal Blockade:  --Beta Blocker: No, severe COPD.  --ARNi / ACEi / ARB: No.   --Aldosterone Antagonist: No.   --SGLT2 Inhibitor: No.  --Home Diuretic: torsemide 30 mg daily. --Inpatient Diuretic: PO torsemide 40 mg daily. Sudden Cardiac Death Risk Reduction:  --LVEF 20%. Electrical Resynchronization:  --Candidacy for BiV device: LBBB with  ms. Advanced Therapies (if appropriate): Not a candidate due to significant lung disease. Coronary artery disease, nonobstructive  Hyperlipidemia  Obstructive sleep apnea  History of tobacco abuse  Prediabetes  Chronic hyponatremia    Past Medical History:   Diagnosis Date   • Acute metabolic encephalopathy 10/11/6897   • Arthritis    • Bladder mass    • Cardiomyopathy [facility])    • Chest pain    • COPD (chronic obstructive pulmonary disease) ([facility])    • COVID-19 virus infection 02/23/2023   • CPAP (continuous positive airway pressure) dependence    • Emphysema of lung ([facility])    • Hypoxia     nocturnal   • Left bundle branch block    • Multiple pulmonary nodules     last assessed: 10/12/16   • Pneumonia    • Sleep apnea, obstructive    • Smoker    • Weight loss 08/29/2019       Review of Systems   Constitutional: Positive for appetite change, fatigue and unexpected weight change. Eyes: Negative. Respiratory: Positive for shortness of breath. Negative for cough and chest tightness. Cardiovascular: Negative for chest pain, palpitations and leg swelling. Gastrointestinal: Negative for abdominal distention, abdominal pain, diarrhea and nausea. Endocrine: Negative. Genitourinary: Negative for decreased urine volume, difficulty urinating, dysuria and urgency. Musculoskeletal: Negative. Skin: Negative. Allergic/Immunologic: Negative. Neurological: Positive for weakness. Negative for dizziness, syncope and light-headedness. Psychiatric/Behavioral: The patient is not nervous/anxious.         Current Facility-Administered Medications:   •  budesonide (PULMICORT) inhalation solution 0.5 mg, 0.5 mg, Nebulization, Q12H, Lucrecia Kelly MD, 0.5 mg at 23  •  formoterol (PERFOROMIST) nebulizer solution 20 mcg, 20 mcg, Nebulization, Q12H, Lucrecia Kelly MD, 20 mcg at 23  •  heparin (porcine) subcutaneous injection 5,000 Units, 5,000 Units, Subcutaneous, Q8H [facility] & long term, 5,000 Units at 23 1455 **AND** [COMPLETED] Platelet count, , , Once, Nati Abarca MD  •  insulin lispro (HumaLOG) 100 units/mL subcutaneous injection 1-6 Units, 1-6 Units, Subcutaneous, 4x Daily (AC & HS), 4 Units at 23 1105 **AND** Fingerstick Glucose (POCT), , , 4x Daily AC and at bedtime, Beena Figueroa MD  •  ipratropium (ATROVENT) 0.02 % inhalation solution 0.5 mg, 0.5 mg, Nebulization, TID, Johnna Hutton, DO, 0.5 mg at 23 1357  •  levalbuterol (XOPENEX) inhalation solution 1.25 mg, 1.25 mg, Nebulization, TID, Johnna Hutton, DO, 1.25 mg at 23 1357  •  methylPREDNISolone sodium succinate (Solu-MEDROL) injection 40 mg, 40 mg, Intravenous, Q12H [facility], Susan Beard MD  •  ondansetron (ZOFRAN) injection 4 mg, 4 mg, Intravenous, Q6H PRN, Nati Abarca MD, 4 mg at 23 1331  •  [START ON 2023] torsemide (DEMADEX) tablet 40 mg, 40 mg, Oral, Daily, Tennille Sharpe PA-C    No Known Allergies  Social History     Socioeconomic History   • Marital status: /Civil Union     Spouse name: Not on file   • Number of children: Not on file   • Years of education: Not on file   • Highest education level: Not on file   Occupational History   • Not on file   Tobacco Use   • Smoking status: Former     Packs/day: 2.50     Years: 42.00     Total pack years: 105.00     Types: Cigarettes     Start date: 5     Quit date:      Years since quittin.5   • Smokeless tobacco: Former   • Tobacco comments:     1 ppd for 37 years,  down to 5 cigs a day, is around second hand smoke   Vaping Use   • Vaping Use: Never used   Substance and Sexual Activity   • Alcohol use: Not Currently     Comment: rarely   • Drug use: No   • Sexual activity: Not Currently     Partners: Female   Other Topics Concern   • Not on file   Social History Narrative    Daily coffee consumption: 8 or more cups a day        Used to work in [employer]. On disability. Social Determinants of Health     Financial Resource Strain: Not on file   Food Insecurity: No Food Insecurity (7/20/2023)    Hunger Vital Sign    • Worried About Running Out of Food in the Last Year: Never true    • Ran Out of Food in the Last Year: Never true   Transportation Needs: No Transportation Needs (7/20/2023)    PRAPARE - Transportation    • Lack of Transportation (Medical): No    • Lack of Transportation (Non-Medical): No   Physical Activity: Not on file   Stress: Not on file   Social Connections: Not on file   Intimate Partner Violence: Not on file   Housing Stability: Low Risk  (7/20/2023)    Housing Stability Vital Sign    • Unable to Pay for Housing in the Last Year: No    • Number of Places Lived in the Last Year: 1    • Unstable Housing in the Last Year: No     Family History   Problem Relation Age of Onset   • Diabetes Mother        Vitals:  Blood pressure 129/81, pulse 102, temperature 97.9 °F (36.6 °C), temperature source Oral, resp. rate 22, height 5' 7" (1.702 m), weight 42.6 kg (94 lb), SpO2 92 %. I/O last 3 completed shifts:   In: 420 [P.O.:420]  Out: 1446 [Urine:1446]    Weight (last 2 days)     Date/Time Weight    07/24/23 0508 42.6 (94)    07/23/23 0501 42.1 (92.81)    07/22/23 1908 42.9 (94.58)        Wt Readings from Last 10 Encounters:   07/24/23 42.6 kg (94 lb)   07/21/23 44.5 kg (98 lb 3.2 oz)   06/26/23 43.8 kg (96 lb 8 oz)   06/16/23 43.1 kg (95 lb)   06/12/23 47.8 kg (105 lb 4.8 oz)   06/01/23 46.7 kg (103 lb)   05/19/23 46.8 kg (103 lb 2.8 oz)   05/18/23 47.2 kg (104 lb)   05/16/23 48 kg (105 lb 12.8 oz)   05/10/23 52.4 kg (115 lb 9.6 oz)       Vitals:    07/24/23 1356 07/24/23 1401 07/24/23 1424 07/24/23 1500   BP:    129/81   BP Location:       Pulse:  (!) 112 (!) 109 102   Resp:    22   Temp:    97.9 °F (36.6 °C)   TempSrc:    Oral   SpO2: 90%  94% 92%   Weight:       Height:           Physical Exam  Vitals reviewed. Constitutional:       General: He is awake. He is not in acute distress. Appearance: Normal appearance. He is well-developed. He is cachectic. He is ill-appearing. He is not toxic-appearing or diaphoretic. Interventions: Nasal cannula in place. HENT:      Head: Normocephalic. Nose: Nose normal.      Mouth/Throat:      Mouth: Mucous membranes are moist.   Eyes:      General: No scleral icterus. Conjunctiva/sclera: Conjunctivae normal.   Neck:      Vascular: No JVD. Trachea: No tracheal deviation. Cardiovascular:      Rate and Rhythm: Regular rhythm. Tachycardia present. No extrasystoles are present. Heart sounds: No murmur heard. Pulmonary:      Effort: Pulmonary effort is normal. Tachypnea present. No bradypnea or respiratory distress. Breath sounds: Decreased air movement present. Decreased breath sounds present. No rales. Abdominal:      General: Bowel sounds are normal. There is no distension. Palpations: Abdomen is soft. Tenderness: There is no abdominal tenderness. Musculoskeletal:      Cervical back: Neck supple. Right lower leg: No edema. Left lower leg: No edema. Skin:     General: Skin is warm and dry. Coloration: Skin is cyanotic. Skin is not jaundiced or pale. Neurological:      General: No focal deficit present. Mental Status: He is alert and oriented to person, place, and time. Psychiatric:         Mood and Affect: Affect is flat. Speech: Speech normal.         Behavior: Behavior is cooperative.        Lines/Drains/Airways     Active Status     None Labs & Results:      Results from last 7 days   Lab Units 07/23/23  0524 07/22/23  1948 07/22/23  1539 07/19/23  0618   WBC Thousand/uL 6.08  --  11.54* 10.45*   HEMOGLOBIN g/dL 11.5*  --  12.0 12.6   HEMATOCRIT % 37.3  --  39.1 37.4   PLATELETS Thousands/uL 185 182 216 159         Results from last 7 days   Lab Units 07/24/23  0437 07/23/23  0524 07/22/23  1539 07/21/23  0442 07/20/23  0516   POTASSIUM mmol/L 4.0 4.9 5.2   < > 3.5   CHLORIDE mmol/L 79* 81* 78*   < > 73*   CO2 mmol/L >45* >45* >45*   < > >45*   BUN mg/dL 28* 22 22   < > 19   CREATININE mg/dL 0.78 0.67 0.77   < > 0.78   CALCIUM mg/dL 9.0 9.5 9.5   < > 9.0   ALK PHOS U/L  --  56 65  --  57   ALT U/L  --  25 27  --  20   AST U/L  --  31 34  --  31    < > = values in this interval not displayed.      Results from last 7 days   Lab Units 07/19/23  0618   INR  0.78*     Cindy Claire

## 2023-07-24 NOTE — PLAN OF CARE
Problem: Potential for Falls  Goal: Patient will remain free of falls  Description: INTERVENTIONS:  - Educate patient/family on patient safety including physical limitations  - Instruct patient to call for assistance with activity   - Consult OT/PT to assist with strengthening/mobility   - Keep Call bell within reach  - Keep bed low and locked with side rails adjusted as appropriate  - Keep care items and personal belongings within reach  - Initiate and maintain comfort rounds  - Make Fall Risk Sign visible to staff  - Offer Toileting every  Hours, in advance of need  - Initiate/Maintain alarm  - Obtain necessary fall risk management equipment:   - Apply yellow socks and bracelet for high fall risk patients  - Consider moving patient to room near nurses station  Outcome: Progressing     Problem: MOBILITY - ADULT  Goal: Maintain or return to baseline ADL function  Description: INTERVENTIONS:  -  Assess patient's ability to carry out ADLs; assess patient's baseline for ADL function and identify physical deficits which impact ability to perform ADLs (bathing, care of mouth/teeth, toileting, grooming, dressing, etc.)  - Assess/evaluate cause of self-care deficits   - Assess range of motion  - Assess patient's mobility; develop plan if impaired  - Assess patient's need for assistive devices and provide as appropriate  - Encourage maximum independence but intervene and supervise when necessary  - Involve family in performance of ADLs  - Assess for home care needs following discharge   - Consider OT consult to assist with ADL evaluation and planning for discharge  - Provide patient education as appropriate  Outcome: Progressing  Goal: Maintains/Returns to pre admission functional level  Description: INTERVENTIONS:  - Perform BMAT or MOVE assessment daily.   - Set and communicate daily mobility goal to care team and patient/family/caregiver.    - Collaborate with rehabilitation services on mobility goals if consulted  - Perform Range of Motion times a day. - Reposition patient every  hours. - Dangle patient  times a day  - Stand patient  times a day  - Ambulate patient times a day  - Out of bed to chair  times a day   - Out of bed for meals times a day  - Out of bed for toileting  - Record patient progress and toleration of activity level   Outcome: Progressing     Problem: Prexisting or High Potential for Compromised Skin Integrity  Goal: Skin integrity is maintained or improved  Description: INTERVENTIONS:  - Identify patients at risk for skin breakdown  - Assess and monitor skin integrity  - Assess and monitor nutrition and hydration status  - Monitor labs   - Assess for incontinence   - Turn and reposition patient  - Assist with mobility/ambulation  - Relieve pressure over bony prominences  - Avoid friction and shearing  - Provide appropriate hygiene as needed including keeping skin clean and dry  - Evaluate need for skin moisturizer/barrier cream  - Collaborate with interdisciplinary team   - Patient/family teaching  - Consider wound care consult   Outcome: Progressing     Problem: Nutrition/Hydration-ADULT  Goal: Nutrient/Hydration intake appropriate for improving, restoring or maintaining nutritional needs  Description: Monitor and assess patient's nutrition/hydration status for malnutrition. Collaborate with interdisciplinary team and initiate plan and interventions as ordered. Monitor patient's weight and dietary intake as ordered or per policy. Utilize nutrition screening tool and intervene as necessary. Determine patient's food preferences and provide high-protein, high-caloric foods as appropriate.      INTERVENTIONS:  - Monitor oral intake, urinary output, labs, and treatment plans  - Assess nutrition and hydration status and recommend course of action  - Evaluate amount of meals eaten  - Assist patient with eating if necessary   - Allow adequate time for meals  - Recommend/ encourage appropriate diets, oral nutritional supplements, and vitamin/mineral supplements  - Order, calculate, and assess calorie counts as needed  - Recommend, monitor, and adjust tube feedings and TPN/PPN based on assessed needs  - Assess need for intravenous fluids  - Provide specific nutrition/hydration education as appropriate  - Include patient/family/caregiver in decisions related to nutrition  Outcome: Progressing

## 2023-07-24 NOTE — PROGRESS NOTES
Progress Note - Pulmonary   Alli Reece. 79 y.o. male MRN: [id number]  Unit/Bed#: [facility] 525-01 Encounter: [id number]      Assessment:  Acute on chronic hypoxemic/hypercapnic respiratory failure  COPD- very severe, end stage, in exacerbation  Acute on chronic combined systolic/diastolic heart failure  Pulmonary cachexia  Underweight- BMI 14.5  Protein-calorie malnutrition    Plan:  Xopenex/Atrovent TID  Budesonide/Formoterol q12  Solumedrol 40 mg IV q12  Wean O2 down for SPO2 goal >88%. Now on high flow and will try fan on face  BiPAP qHS and PRN  OK to hold off on antibiotics given recent course. Diuresis per primary team. Consider adding diamox. Ongoing goals of care discussions. I discussed that hospice is most appropriate. I discussed that beyond his current therapy the only other measure to treat his dyspnea would require hospice care. - Currently does not want chest compressions but wants intubation. Family meeting tomorrow to discuss GOC     Subjective:   Feels ok. Denies fever/chills/CP/palpitation/abdominal pain/changes to bladder or bowel function. +SOB. Feels better on BiPAP    Objective:   Vitals: Blood pressure 103/65, pulse 94, temperature 97.6 °F (36.4 °C), temperature source Oral, resp. rate (!) 28, height 5' 7" (1.702 m), weight 42.6 kg (94 lb), SpO2 98 %. on BiPAP, Body mass index is 14.72 kg/m². Intake/Output Summary (Last 24 hours) at 7/24/2023 7049  Last data filed at 7/24/2023 0804  Gross per 24 hour   Intake 720 ml   Output 1149 ml   Net -429 ml         Physical Exam  GEN: cachectic, ill-appearing  Heart: distant heart sounds difficult to auscultate in setting of loud wheezing, Bilateral LE edema 2+ to midshins, warm extremities  Lungs: diffuse expiratory wheezing, respiratory distress  Abdomen: soft, non-tender  Neuro: normal though content and mood    Labs: I have personally reviewed pertinent lab results.   Laboratory and Diagnostics  Results from last 7 days   Lab Units 07/23/23  0524 07/22/23  1948 07/22/23  1539 07/19/23  0618 07/19/23  0245   WBC Thousand/uL 6.08  --  11.54* 10.45* 11.08*   HEMOGLOBIN g/dL 11.5*  --  12.0 12.6 12.0   HEMATOCRIT % 37.3  --  39.1 37.4 36.1*   PLATELETS Thousands/uL 185 182 216 159 204   NEUTROS PCT %  --   --  94* 94* 79*   MONOS PCT %  --   --  3* 3* 12   EOS PCT %  --   --  0 0 0     Results from last 7 days   Lab Units 07/24/23  0437 07/23/23  0524 07/22/23  1539 07/21/23  0442 07/20/23  0516 07/19/23  1225 07/19/23  0245   SODIUM mmol/L 131* 131* 129* 131* 126* 123* 123*   POTASSIUM mmol/L 4.0 4.9 5.2 3.7 3.5 3.3* 3.3*   CHLORIDE mmol/L 79* 81* 78* 80* 73* 70* 71*   CO2 mmol/L >45* >45* >45* >45* >45* >45* >45*   BUN mg/dL 28* 22 22 21 19 14 16   CREATININE mg/dL 0.78 0.67 0.77 0.70 0.78 0.76 0.77   CALCIUM mg/dL 9.0 9.5 9.5 9.3 9.0 9.0 8.8   GLUCOSE RANDOM mg/dL 319* 125 292* 202* 116 243* 144*   ALT U/L  --  25 27  --  20  --  23   AST U/L  --  31 34  --  31  --  43   ALK PHOS U/L  --  56 65  --  57  --  64   ALBUMIN g/dL  --  3.4* 3.7  --  3.2*  --  3.5   TOTAL BILIRUBIN mg/dL  --  0.70 0.72  --  0.75  --  0.82     Results from last 7 days   Lab Units 07/24/23 0437 07/20/23  0516   MAGNESIUM mg/dL 2.3 2.3      Results from last 7 days   Lab Units 07/19/23  0618   INR  0.78*   PTT seconds 19*                          Results from last 7 days   Lab Units 07/22/23  1539   D-DIMER QUANTITATIVE ug/ml FEU <0.27     Results from last 7 days   Lab Units 07/20/23  0516 07/19/23  0828   PROCALCITONIN ng/ml 0.06 0.05       ABG:   Results from last 7 days   Lab Units 07/20/23  1437   PH ART  7.438   PCO2 ART mm Hg 78.4*   PO2 ART mm Hg 75.7   HCO3 ART mmol/L 51.8*   BASE EXC ART mmol/L 23.3   ABG SOURCE  Radial, Left       Microbiology:  COVID/Flu/RSV negative 7/19/23    Imaging and other studies: I have personally reviewed pertinent reports. CXR 7/23:  Emphysema with mild pulmonary venous congestion.    _________________  Neris June MD  Internal Medicine Residency PGY-I  [address] [facility],[address] B: Portions of the record may have been created with voice recognition software. Occasional wrong word or "sound a like" substitutions may have occurred due to the inherent limitations of voice recognition software. Careful consideration should be taken to recognize, using context, where substitutions have occurred.

## 2023-07-24 NOTE — RESPIRATORY THERAPY NOTE
Resp Care Note       07/24/23 1958   Respiratory Assessment   Resp Comments Called back to the pt's room by nrsg as SpO2 had fallen to 84%. Upon arrival FiO2 on HFNC had been increased from 32% to 40% and SpO2 arose to 91% . Pt tachypneic and c/o belly pain. RN contacted [facility], suppository ordered. Pt not on any stool softners. Unsure when he last had a b/m. Wife asked if pt could go back onto the BiPAP now. Will refer the question to Critical Care Fellow, Brie Castellon via the nurse. RN will contact Dr. Red Kay.  For now keeping at 40% and 50 lpm HFNC with V 60 in his room on s/b.    O2 Device HFNC   Non-Invasive Information   O2 Interface Device HFNC prongs   Non-Invasive Ventilation Mode HFNC (High flow)   SpO2 92 %   $ Pulse Oximetry Spot Check Charge Completed   Non-Invasive Settings   FiO2 (%) (S)  40   Flow (lpm) 50

## 2023-07-25 LAB
ATRIAL RATE: 96 BPM
GLUCOSE SERPL-MCNC: 192 MG/DL (ref 65–140)
GLUCOSE SERPL-MCNC: 248 MG/DL (ref 65–140)
GLUCOSE SERPL-MCNC: 262 MG/DL (ref 65–140)
GLUCOSE SERPL-MCNC: 371 MG/DL (ref 65–140)
P AXIS: 78 DEGREES
PR INTERVAL: 172 MS
QRS AXIS: 72 DEGREES
QRSD INTERVAL: 134 MS
QT INTERVAL: 384 MS
QTC INTERVAL: 485 MS
T WAVE AXIS: 258 DEGREES
VENTRICULAR RATE: 96 BPM

## 2023-07-25 PROCEDURE — 93010 ELECTROCARDIOGRAM REPORT: CPT | Performed by: INTERNAL MEDICINE

## 2023-07-25 PROCEDURE — 82948 REAGENT STRIP/BLOOD GLUCOSE: CPT

## 2023-07-25 PROCEDURE — 99232 SBSQ HOSP IP/OBS MODERATE 35: CPT | Performed by: FAMILY MEDICINE

## 2023-07-25 PROCEDURE — 94664 DEMO&/EVAL PT USE INHALER: CPT

## 2023-07-25 PROCEDURE — 99497 ADVNCD CARE PLAN 30 MIN: CPT | Performed by: INTERNAL MEDICINE

## 2023-07-25 PROCEDURE — 94760 N-INVAS EAR/PLS OXIMETRY 1: CPT

## 2023-07-25 PROCEDURE — 93005 ELECTROCARDIOGRAM TRACING: CPT

## 2023-07-25 PROCEDURE — 99232 SBSQ HOSP IP/OBS MODERATE 35: CPT | Performed by: INTERNAL MEDICINE

## 2023-07-25 PROCEDURE — 94660 CPAP INITIATION&MGMT: CPT

## 2023-07-25 PROCEDURE — 94640 AIRWAY INHALATION TREATMENT: CPT

## 2023-07-25 RX ORDER — AZITHROMYCIN 250 MG/1
500 TABLET, FILM COATED ORAL EVERY 24 HOURS
Status: COMPLETED | OUTPATIENT
Start: 2023-07-25 | End: 2023-07-27

## 2023-07-25 RX ADMIN — INSULIN LISPRO 3 UNITS: 100 INJECTION, SOLUTION INTRAVENOUS; SUBCUTANEOUS at 08:32

## 2023-07-25 RX ADMIN — INSULIN LISPRO 2 UNITS: 100 INJECTION, SOLUTION INTRAVENOUS; SUBCUTANEOUS at 21:21

## 2023-07-25 RX ADMIN — LEVALBUTEROL HYDROCHLORIDE 1.25 MG: 1.25 SOLUTION RESPIRATORY (INHALATION) at 13:39

## 2023-07-25 RX ADMIN — HEPARIN SODIUM 5000 UNITS: 5000 INJECTION INTRAVENOUS; SUBCUTANEOUS at 21:21

## 2023-07-25 RX ADMIN — LEVALBUTEROL HYDROCHLORIDE 1.25 MG: 1.25 SOLUTION RESPIRATORY (INHALATION) at 07:47

## 2023-07-25 RX ADMIN — METHYLPREDNISOLONE SODIUM SUCCINATE 40 MG: 40 INJECTION, POWDER, FOR SOLUTION INTRAMUSCULAR; INTRAVENOUS at 20:28

## 2023-07-25 RX ADMIN — FORMOTEROL FUMARATE DIHYDRATE 20 MCG: 20 SOLUTION RESPIRATORY (INHALATION) at 07:47

## 2023-07-25 RX ADMIN — DOCUSATE SODIUM 100 MG: 100 CAPSULE ORAL at 08:29

## 2023-07-25 RX ADMIN — IPRATROPIUM BROMIDE 0.5 MG: 0.5 SOLUTION RESPIRATORY (INHALATION) at 07:47

## 2023-07-25 RX ADMIN — LEVALBUTEROL HYDROCHLORIDE 1.25 MG: 1.25 SOLUTION RESPIRATORY (INHALATION) at 19:47

## 2023-07-25 RX ADMIN — BUDESONIDE 0.5 MG: 0.5 INHALANT ORAL at 19:47

## 2023-07-25 RX ADMIN — METHYLPREDNISOLONE SODIUM SUCCINATE 40 MG: 40 INJECTION, POWDER, FOR SOLUTION INTRAMUSCULAR; INTRAVENOUS at 08:34

## 2023-07-25 RX ADMIN — TORSEMIDE 40 MG: 20 TABLET ORAL at 08:29

## 2023-07-25 RX ADMIN — FORMOTEROL FUMARATE DIHYDRATE 20 MCG: 20 SOLUTION RESPIRATORY (INHALATION) at 19:47

## 2023-07-25 RX ADMIN — HEPARIN SODIUM 5000 UNITS: 5000 INJECTION INTRAVENOUS; SUBCUTANEOUS at 05:00

## 2023-07-25 RX ADMIN — INSULIN LISPRO 6 UNITS: 100 INJECTION, SOLUTION INTRAVENOUS; SUBCUTANEOUS at 12:05

## 2023-07-25 RX ADMIN — INSULIN LISPRO 3 UNITS: 100 INJECTION, SOLUTION INTRAVENOUS; SUBCUTANEOUS at 16:21

## 2023-07-25 RX ADMIN — SENNOSIDES 8.6 MG: 8.6 TABLET, FILM COATED ORAL at 19:53

## 2023-07-25 RX ADMIN — AZITHROMYCIN DIHYDRATE 500 MG: 250 TABLET, FILM COATED ORAL at 16:21

## 2023-07-25 RX ADMIN — DOCUSATE SODIUM 100 MG: 100 CAPSULE ORAL at 17:18

## 2023-07-25 RX ADMIN — IPRATROPIUM BROMIDE 0.5 MG: 0.5 SOLUTION RESPIRATORY (INHALATION) at 13:39

## 2023-07-25 RX ADMIN — IPRATROPIUM BROMIDE 0.5 MG: 0.5 SOLUTION RESPIRATORY (INHALATION) at 19:47

## 2023-07-25 RX ADMIN — HEPARIN SODIUM 5000 UNITS: 5000 INJECTION INTRAVENOUS; SUBCUTANEOUS at 13:24

## 2023-07-25 RX ADMIN — BUDESONIDE 0.5 MG: 0.5 INHALANT ORAL at 07:46

## 2023-07-25 NOTE — ASSESSMENT & PLAN NOTE
· Admitted from 7/19 to 7/21 with acute exacerbation and discharged on steroid taper. On O2 at 3L and Prednisone 5 mg at baseline  · Developed acute shortness of breath on 7/22 while sitting in the kitchen  · Per EMS, patient was hypoxic at 60%, placed on BiPAP on route, and was given Solu-Medrol and DuoNeb on route.   · Seen by pulm - Solumedrol decreased to 40 mg IV q 12 h,, on Xopenex/Atrovent/Pulmicort/Formoterol nebs  · Discussed with pulm - was taken off Bipap due to nausea and placed on HFNC for comfort, Bipap hs if not vomiting, family meeting needed  · Discussed with palliative care - family meeting at 10:00 am tomorrow after discussion with teams involved in his care

## 2023-07-25 NOTE — ACP (ADVANCE CARE PLANNING)
Record of Family Meeting - Palliative and Supportive Care   Maria Del Carmen Talley. 79 y.o. male [id number]      Assessment:  goals of care, end-stage COPD, chronic KEVIN, chronic hypercapnia, chronic HFrEF, acute on chronic respiratory failure, change in code status      Recommendations and Plan:  - level 3 code status  - ongoing medical optimization with the goal of going home, however brief  - Revisit goals pending clinical status  - Discussed with charge/nursing mgmt. Will attempt to get his dog in for a visit. - appreciate ongoing  support. A family meeting was held for Kevin. This meeting was necessary for determine the appropriate course of treatment. Time of Meeting: 10:30   Meeting Location: patient room  Participants: Dr. Iker Garcia, Dr. Sallie Sevilla, Dr. Leyla Cohn, patient, spouse, son. Chaka Flowers MD     Patient Participation: yes    Patient Support System: family     Advanced Directive of POLST available: no    Topics of Discussion:  Reviewed at length his medical condition including events of this hospital stay as well as the past several years. Discussed his ongoing worsening debility and increased frequency of hospitalizations. Discussed previously expressed wishes including never being dependent on a machine. Discussed likelihood of poor outcomes if he required intubation, while acknowledging uncertainty around his diagnosis  Discussed his goals - Magan Marquis expresses that being home is the most important thing, citing his family and his dog. Discussed his spouse's perception - Magan Marquis has surprised the team many times, is strong, is a fighter, and opal that God will support him in his illness. Magan Marquis eventually expresses frustration with his debility, weight loss, lack of muscle mass, state of constant hospital stays. Expresses a desire to be done with current treatment which is not concordant with wishes of his spouse.         Other Content of Meeting:  Significant support provided. Acknowledged and apologized for repeat goals meetings, while clearly identifying the need and relative urgency of these conversations. Family appropriately tearful. Time Involved in Meetin minutes, beginning at approximately  10:30 and ending at approximately 11:20. Jerrica Nelson MD   Palliative and Supportive Care  Clinic/Answering Service: [phone]  You can find me on [employer]!

## 2023-07-25 NOTE — PROGRESS NOTES
[address] [place] [address]  Progress Note  Name: Margarette Vigil  MRN: [id number]  Unit/Bed#: [facility] 525-01 I Date of Admission: 7/22/2023   Date of Service: 7/24/2023 I Hospital Day: 2    Assessment/Plan   * End-stage COPD with acute exacerbation [facility])  Assessment & Plan  · Admitted from 7/19 to 7/21 with acute exacerbation and discharged on steroid taper. On O2 at 3L and Prednisone 5 mg at baseline  · Developed acute shortness of breath on 7/22 while sitting in the kitchen  · Per EMS, patient was hypoxic at 60%, placed on BiPAP on route, and was given Solu-Medrol and DuoNeb on route.   · Seen by pulm - Solumedrol decreased to 40 mg IV q 12 h,, on Xopenex/Atrovent/Pulmicort/Formoterol nebs  · Discussed with pulm - was taken off Bipap due to nausea and placed on HFNC for comfort, Bipap hs if not vomiting, family meeting needed  · Discussed with palliative care - family meeting at 10:00 am tomorrow after discussion with teams involved in his care    Chronic HFrEF (heart failure with reduced ejection fraction) ([address])  Assessment & Plan  Wt Readings from Last 3 Encounters:   07/24/23 42.6 kg (94 lb)   07/21/23 44.5 kg (98 lb 3.2 oz)   06/26/23 43.8 kg (96 lb 8 oz)     · Was on IV Lasix - transitioned to Torsemide 40 mg daily by cardiology today  · Euvolemic at present  · Monitor I/O, daily weights        Acute on chronic respiratory failure with hypoxia ([address])  Assessment & Plan  · Due to above  · On home O2 at 3 L  · HFNC as above    Hyponatremia  Assessment & Plan  · Chronic hyponatremia noted, sodium at baseline    Chronic hypercapnia/KEVIN  Assessment & Plan  · BiPAP hs and prn    Prediabetes  Assessment & Plan  · HbA1c 5.7 on 7/20/23  · Monitor blood sugars on steroids    Severe protein-calorie malnutrition (HCC)  Assessment & Plan  Malnutrition Findings:   Adult Malnutrition type: Chronic illness  Adult Degree of Malnutrition: Other severe protein calorie malnutrition  Malnutrition Characteristics: Fat loss, Muscle loss, Inadequate energy, Weight loss                  360 Statement: Severe protein/calorie malnutrition r/t catabolic illness as evidenced by 13% unplanned weight loss since 23, consuming < 75% energy intake compared to estimated energy needs, fat/muscle depletion of orbital/temple areas, BMI 14.5. Treated with Ensure Compact bid    BMI Findings:  Adult BMI Classifications: Underweight < 18.5        Body mass index is 14.72 kg/m². Prostate cancer [facility])  Assessment & Plan  · Outpatient follow-up    VTE Pharmacologic Prophylaxis: VTE Score: 6 High Risk (Score >/= 5) - Pharmacological DVT Prophylaxis Ordered: heparin. Sequential Compression Devices Ordered. Patient Centered Rounds: I performed bedside rounds with nursing staff today. Discussions with Specialists or Other Care Team Provider: Discussed with palliative care and pulmonology    Education and Discussions with Family / Patient: Updated  (wife and son) at bedside. Total Time Spent on Date of Encounter in care of patient: 45 minutes This time was spent on one or more of the following: performing physical exam; counseling and coordination of care; obtaining or reviewing history; documenting in the medical record; reviewing/ordering tests, medications or procedures; communicating with other healthcare professionals and discussing with patient's family/caregivers. Current Length of Stay: 2 day(s)  Current Patient Status: Inpatient   Certification Statement: The patient will continue to require additional inpatient hospital stay due to End-stage COPD with acute exacerbation    Code Status: Level 2 - DNAR: but accepts endotracheal intubation    Subjective:   No change in shortness of breath. Had episodes of vomiting this morning.      Objective:     Vitals:   Temp (24hrs), Av.7 °F (36.5 °C), Min:96.2 °F (35.7 °C), Max:98.8 °F (37.1 °C)    Temp:  [96.2 °F (35.7 °C)-98.8 °F (37.1 °C)] 97.6 °F (36.4 °C)  HR:  [] 102  Resp:  [16-28] 22  BP: ()/(52-81) 103/73  SpO2:  [83 %-100 %] 94 %  Body mass index is 14.72 kg/m². Physical Exam:   Physical Exam  Constitutional:       Appearance: He is cachectic. He is ill-appearing. HENT:      Head: Normocephalic. Nose: Nose normal.      Mouth/Throat:      Mouth: Mucous membranes are moist.   Eyes:      Extraocular Movements: Extraocular movements intact. Cardiovascular:      Rate and Rhythm: Tachycardia present. Pulmonary:      Effort: Tachypnea present. Comments: Decreased breath sounds bilaterally  Abdominal:      General: Bowel sounds are normal.      Palpations: Abdomen is soft. Musculoskeletal:      Cervical back: Neck supple. Skin:     General: Skin is warm and dry. Neurological:      General: No focal deficit present. Additional Data:     Labs:  Results from last 7 days   Lab Units 07/23/23  0524 07/22/23  1948 07/22/23  1539   WBC Thousand/uL 6.08  --  11.54*   HEMOGLOBIN g/dL 11.5*  --  12.0   HEMATOCRIT % 37.3  --  39.1   PLATELETS Thousands/uL 185   < > 216   NEUTROS PCT %  --   --  94*   LYMPHS PCT %  --   --  2*   MONOS PCT %  --   --  3*   EOS PCT %  --   --  0    < > = values in this interval not displayed.      Results from last 7 days   Lab Units 07/24/23  0437 07/23/23  0524   SODIUM mmol/L 131* 131*   POTASSIUM mmol/L 4.0 4.9   CHLORIDE mmol/L 79* 81*   CO2 mmol/L >45* >45*   BUN mg/dL 28* 22   CREATININE mg/dL 0.78 0.67   CALCIUM mg/dL 9.0 9.5   ALBUMIN g/dL  --  3.4*   TOTAL BILIRUBIN mg/dL  --  0.70   ALK PHOS U/L  --  56   ALT U/L  --  25   AST U/L  --  31   GLUCOSE RANDOM mg/dL 319* 125     Results from last 7 days   Lab Units 07/19/23  0618   INR  0.78*     Results from last 7 days   Lab Units 07/24/23  1604 07/24/23  1051 07/24/23  0729 07/23/23  2102 07/23/23  1546 07/23/23  1038 07/23/23  0525 07/22/23  2356 07/22/23  1947 07/22/23  1626 07/21/23  1059 07/21/23  0618   POC GLUCOSE mg/dl 146* 289* 232* 193* 306* 305* 137 119 191* 304* 232* 165*     Results from last 7 days   Lab Units 07/20/23  0516   HEMOGLOBIN A1C % 5.7*     Results from last 7 days   Lab Units 07/20/23  0516 07/19/23  0828   PROCALCITONIN ng/ml 0.06 0.05       Lines/Drains:  Invasive Devices     Peripheral Intravenous Line  Duration           Peripheral IV 07/22/23 Distal;Left;Upper;Ventral (anterior) Arm 2 days    Peripheral IV 07/22/23 Dorsal (posterior); Left Forearm 2 days                Last 24 Hours Medication List:   Current Facility-Administered Medications   Medication Dose Route Frequency Provider Last Rate   • budesonide  0.5 mg Nebulization Q12H Lucrecia Kelly MD     • docusate sodium  100 mg Oral BID Colin Daley MD     • formoterol  20 mcg Nebulization Q12H Burnett Kussmaul, MD     • heparin (porcine)  5,000 Units Subcutaneous Q8H [address] Burnett Kussmaul, MD     • insulin lispro  1-6 Units Subcutaneous 4x Daily (AC & HS) Colin Daley MD     • ipratropium  0.5 mg Nebulization TID Jl Gonsalez DO     • levalbuterol  1.25 mg Nebulization TID Jl Gonsalez DO     • methylPREDNISolone sodium succinate  40 mg Intravenous Q12H Marco Wayne MD     • ondansetron  4 mg Intravenous Q6H PRN Burnett Kussmaul, MD     • senna  1 tablet Oral HS Colin Daley MD     • [START ON 7/25/2023] torsemide  40 mg Oral Daily Cindy Galarza          Today, Patient Was Seen By: Colin Daley MD    **Please Note: This note may have been constructed using a voice recognition system. **

## 2023-07-25 NOTE — ASSESSMENT & PLAN NOTE
Wt Readings from Last 3 Encounters:   07/25/23 42.5 kg (93 lb 9.6 oz)   07/21/23 44.5 kg (98 lb 3.2 oz)   06/26/23 43.8 kg (96 lb 8 oz)     · Was on IV Lasix - transitioned to Torsemide 40 mg daily by cardiology Monday  · Euvolemic at present  · Monitor I/O, daily weights

## 2023-07-25 NOTE — ASSESSMENT & PLAN NOTE
Malnutrition Findings:   Adult Malnutrition type: Chronic illness  Adult Degree of Malnutrition: Other severe protein calorie malnutrition  Malnutrition Characteristics: Fat loss, Muscle loss, Inadequate energy, Weight loss                  360 Statement: Severe protein/calorie malnutrition r/t catabolic illness as evidenced by 13% unplanned weight loss since 1/23/23, consuming < 75% energy intake compared to estimated energy needs, fat/muscle depletion of orbital/temple areas, BMI 14.5. Treated with Ensure Compact bid    BMI Findings:  Adult BMI Classifications: Underweight < 18.5        Body mass index is 14.72 kg/m².

## 2023-07-25 NOTE — ASSESSMENT & PLAN NOTE
· Admitted from 7/19 to 7/21 with acute exacerbation and discharged on steroid taper. On O2 at 3L and Prednisone 5 mg at baseline  · Developed acute shortness of breath on 7/22 while sitting in the kitchen  · Per EMS, patient was hypoxic at 60%, placed on BiPAP on route, and was given Solu-Medrol and DuoNeb on route. · Seen by pulm - Solumedrol decreased to 40 mg IV q 12 h,, on Xopenex/Atrovent/Pulmicort/Formoterol nebs  · Discussed with pulm - was taken off Bipap due to nausea and placed on HFNC for comfort, Bipap hs if not vomiting,   · Discussed with palliative care -had a family meeting today along with pulmonology.   Patient was made DNR/DNI  · Currently on high flow oxygen-needs to be weaned down before the patient can be discharged

## 2023-07-25 NOTE — ASSESSMENT & PLAN NOTE
Last blood glucose 304   Does not have formal diagnosis of diabetes  Likely secondary to steroids.   Hemoglobin A1c is 5.7

## 2023-07-25 NOTE — PROGRESS NOTES
Progress Note - Pulmonary   Delta Speak. 79 y.o. male MRN: [id number]  Unit/Bed#: [facility] 525-01 Encounter: [id number]      Assessment:  Acute on chronic hypoxemic/hypercapnic respiratory failure  COPD- very severe, end stage  Acute on chronic combined systolic/diastolic heart failure  Pulmonary cachexia  Underweight- BMI 14.5  Protein-calorie malnutrition  Clinical decline with decreased ability to eat due to energy/SOB/n/v    Plan:  Xopenex/Atrovent TID  Budesonide/Formoterol q12  Solumedrol 40 mg IV q12  SPO2 goal >88%. Placed on high flow yesterday for comfort, may use when awake and use BiPAP when sleeping as long as no nausea/vomiting. Will also try fan on face. Starting 3 day course of azithromycin 500mg (EOT 7/27), qtc 485  Diuresis per primary team. Has been diuresed 400mL since admission based on daily bed weights. Now switched to torsemide 40mg. Ongoing goals of care discussions.  Several discussions regarding hospice and code status. As of yesterday he voiced being tired and ready to stop everything, family meeting this morning. After family meeting today he is now DNR DNI. We will continue providing every treatment to try to get him home one more time and may reassess goals in the near future based on clinical response. Please see palliative care note for more details on discussion. Overnight Events: NAEO  Subjective:   Feels ok, much the same as yesterday. Denies fever/chills/chest pain/abdominal pain/n/v. Continues to feel SOB though High flow helps. He did vomit yesterday after eating lunch and a small amount after dinner as well. He used BiPAP overnight with no issue. Now feels comfortable on High flow and he was able to eat breakfast without issue, reports eating all his food. Does not remember last time he had bowel movement.     Today he reports that he wants to get well enough to go home, spend time at home and then the next time he has to come to the hospital he wants it to "all be over" and he "will be done then"    Objective:   Vitals: 97.6F, , RR 22, /68, SpO2 87% HFNC at 41% FiO2 at 53L, Body mass index is 14.66 kg/m². Intake/Output Summary (Last 24 hours) at 7/25/2023 0842  Last data filed at 7/25/2023 0438  Gross per 24 hour   Intake 360 ml   Output 475 ml   Net -115 ml         Physical Exam  GEN: cachectic, ill-appearing  Heart: distant heart sounds difficult to auscultate in setting of loud wheezing, Bilateral LE edema 2+ to midshins, warm extremities  Lungs: diffuse expiratory wheezing, respiratory distress  Abdomen: soft, non-tender  Neuro: normal though content and mood    Labs: I have personally reviewed pertinent lab results.   Laboratory and Diagnostics  Results from last 7 days   Lab Units 07/23/23  0524 07/22/23  1948 07/22/23  1539 07/19/23  0618 07/19/23  0245   WBC Thousand/uL 6.08  --  11.54* 10.45* 11.08*   HEMOGLOBIN g/dL 11.5*  --  12.0 12.6 12.0   HEMATOCRIT % 37.3  --  39.1 37.4 36.1*   PLATELETS Thousands/uL 185 182 216 159 204   NEUTROS PCT %  --   --  94* 94* 79*   MONOS PCT %  --   --  3* 3* 12   EOS PCT %  --   --  0 0 0     Results from last 7 days   Lab Units 07/24/23  0437 07/23/23  0524 07/22/23  1539 07/21/23  0442 07/20/23  0516 07/19/23  1225 07/19/23  0245   SODIUM mmol/L 131* 131* 129* 131* 126* 123* 123*   POTASSIUM mmol/L 4.0 4.9 5.2 3.7 3.5 3.3* 3.3*   CHLORIDE mmol/L 79* 81* 78* 80* 73* 70* 71*   CO2 mmol/L >45* >45* >45* >45* >45* >45* >45*   BUN mg/dL 28* 22 22 21 19 14 16   CREATININE mg/dL 0.78 0.67 0.77 0.70 0.78 0.76 0.77   CALCIUM mg/dL 9.0 9.5 9.5 9.3 9.0 9.0 8.8   GLUCOSE RANDOM mg/dL 319* 125 292* 202* 116 243* 144*   ALT U/L  --  25 27  --  20  --  23   AST U/L  --  31 34  --  31  --  43   ALK PHOS U/L  --  56 65  --  57  --  64   ALBUMIN g/dL  --  3.4* 3.7  --  3.2*  --  3.5   TOTAL BILIRUBIN mg/dL  --  0.70 0.72  --  0.75  --  0.82     Results from last 7 days   Lab Units 07/24/23  0437 07/20/23  0516   MAGNESIUM mg/dL 2.3 2.3      Results from last 7 days   Lab Units 07/19/23  0618   INR  0.78*   PTT seconds 19*                          Results from last 7 days   Lab Units 07/22/23  1539   D-DIMER QUANTITATIVE ug/ml FEU <0.27     Results from last 7 days   Lab Units 07/20/23  0516 07/19/23  0828   PROCALCITONIN ng/ml 0.06 0.05       ABG:   Results from last 7 days   Lab Units 07/20/23  1437   PH ART  7.438   PCO2 ART mm Hg 78.4*   PO2 ART mm Hg 75.7   HCO3 ART mmol/L 51.8*   BASE EXC ART mmol/L 23.3   ABG SOURCE  Radial, Left       Microbiology:  COVID/Flu/RSV negative 7/19/23    Imaging and other studies: I have personally reviewed pertinent reports. CXR 7/23:  Emphysema with mild pulmonary venous congestion. Tylor Aranda MD  Internal Medicine Residency PGY-I  [address] [facility]: Portions of the record may have been created with voice recognition software. Occasional wrong word or "sound a like" substitutions may have occurred due to the inherent limitations of voice recognition software. Careful consideration should be taken to recognize, using context, where substitutions have occurred.

## 2023-07-25 NOTE — PLAN OF CARE
Problem: Potential for Falls  Goal: Patient will remain free of falls  Description: INTERVENTIONS:  - Educate patient/family on patient safety including physical limitations  - Instruct patient to call for assistance with activity   - Consult OT/PT to assist with strengthening/mobility   - Keep Call bell within reach  - Keep bed low and locked with side rails adjusted as appropriate  - Keep care items and personal belongings within reach  - Initiate and maintain comfort rounds  - Make Fall Risk Sign visible to staff  - Apply yellow socks and bracelet for high fall risk patients  - Consider moving patient to room near nurses station  Outcome: Progressing     Problem: MOBILITY - ADULT  Goal: Maintain or return to baseline ADL function  Description: INTERVENTIONS:  -  Assess patient's ability to carry out ADLs; assess patient's baseline for ADL function and identify physical deficits which impact ability to perform ADLs (bathing, care of mouth/teeth, toileting, grooming, dressing, etc.)  - Assess/evaluate cause of self-care deficits   - Assess range of motion  - Assess patient's mobility; develop plan if impaired  - Assess patient's need for assistive devices and provide as appropriate  - Encourage maximum independence but intervene and supervise when necessary  - Involve family in performance of ADLs  - Assess for home care needs following discharge   - Consider OT consult to assist with ADL evaluation and planning for discharge  - Provide patient education as appropriate  Outcome: Progressing  Goal: Maintains/Returns to pre admission functional level  Description: INTERVENTIONS:  - Perform BMAT or MOVE assessment daily.   - Set and communicate daily mobility goal to care team and patient/family/caregiver. - Collaborate with rehabilitation services on mobility goals if consulted  - Perform Range of Motion 2 times a day. - Reposition patient every 2 hours.   - Dangle patient 2 times a day  - Stand patient 2 times a day  - Ambulate patient 2 times a day  - Out of bed to chair 2 times a day   - Out of bed for meals 2 times a day  - Out of bed for toileting  - Record patient progress and toleration of activity level   Outcome: Progressing     Problem: Prexisting or High Potential for Compromised Skin Integrity  Goal: Skin integrity is maintained or improved  Description: INTERVENTIONS:  - Identify patients at risk for skin breakdown  - Assess and monitor skin integrity  - Assess and monitor nutrition and hydration status  - Monitor labs   - Assess for incontinence   - Turn and reposition patient  - Assist with mobility/ambulation  - Relieve pressure over bony prominences  - Avoid friction and shearing  - Provide appropriate hygiene as needed including keeping skin clean and dry  - Evaluate need for skin moisturizer/barrier cream  - Collaborate with interdisciplinary team   - Patient/family teaching  - Consider wound care consult   Outcome: Progressing     Problem: PAIN - ADULT  Goal: Verbalizes/displays adequate comfort level or baseline comfort level  Description: Interventions:  - Encourage patient to monitor pain and request assistance  - Assess pain using appropriate pain scale  - Administer analgesics based on type and severity of pain and evaluate response  - Implement non-pharmacological measures as appropriate and evaluate response  - Consider cultural and social influences on pain and pain management  - Notify physician/advanced practitioner if interventions unsuccessful or patient reports new pain  Outcome: Progressing     Problem: INFECTION - ADULT  Goal: Absence or prevention of progression during hospitalization  Description: INTERVENTIONS:  - Assess and monitor for signs and symptoms of infection  - Monitor lab/diagnostic results  - Monitor all insertion sites, i.e. indwelling lines, tubes, and drains  - Monitor endotracheal if appropriate and nasal secretions for changes in amount and color  - [place] appropriate cooling/warming therapies per order  - Administer medications as ordered  - Instruct and encourage patient and family to use good hand hygiene technique  - Identify and instruct in appropriate isolation precautions for identified infection/condition  Outcome: Progressing     Problem: DISCHARGE PLANNING  Goal: Discharge to home or other facility with appropriate resources  Description: INTERVENTIONS:  - Identify barriers to discharge w/patient and caregiver  - Arrange for needed discharge resources and transportation as appropriate  - Identify discharge learning needs (meds, wound care, etc.)  - Arrange for interpretive services to assist at discharge as needed  - Refer to Case Management Department for coordinating discharge planning if the patient needs post-hospital services based on physician/advanced practitioner order or complex needs related to functional status, cognitive ability, or social support system  Outcome: Progressing     Problem: Knowledge Deficit  Goal: Patient/family/caregiver demonstrates understanding of disease process, treatment plan, medications, and discharge instructions  Description: Complete learning assessment and assess knowledge base. Interventions:  - Provide teaching at level of understanding  - Provide teaching via preferred learning methods  Outcome: Progressing     Problem: Nutrition/Hydration-ADULT  Goal: Nutrient/Hydration intake appropriate for improving, restoring or maintaining nutritional needs  Description: Monitor and assess patient's nutrition/hydration status for malnutrition. Collaborate with interdisciplinary team and initiate plan and interventions as ordered. Monitor patient's weight and dietary intake as ordered or per policy. Utilize nutrition screening tool and intervene as necessary. Determine patient's food preferences and provide high-protein, high-caloric foods as appropriate.      INTERVENTIONS:  - Monitor oral intake, urinary output, labs, and treatment plans  - Assess nutrition and hydration status and recommend course of action  - Evaluate amount of meals eaten  - Assist patient with eating if necessary   - Allow adequate time for meals  - Recommend/ encourage appropriate diets, oral nutritional supplements, and vitamin/mineral supplements  - Order, calculate, and assess calorie counts as needed  - Recommend, monitor, and adjust tube feedings and TPN/PPN based on assessed needs  - Assess need for intravenous fluids  - Provide specific nutrition/hydration education as appropriate  - Include patient/family/caregiver in decisions related to nutrition  Outcome: Not Progressing     Problem: RESPIRATORY - ADULT  Goal: Achieves optimal ventilation and oxygenation  Description: INTERVENTIONS:  - Assess for changes in respiratory status  - Assess for changes in mentation and behavior  - Position to facilitate oxygenation and minimize respiratory effort  - Oxygen administered by appropriate delivery if ordered  - Initiate smoking cessation education as indicated  - Encourage broncho-pulmonary hygiene including cough, deep breathe, Incentive Spirometry  - Assess the need for suctioning and aspirate as needed  - Assess and instruct to report SOB or any respiratory difficulty  - Respiratory Therapy support as indicated  Outcome: Not Progressing     Problem: INFECTION - ADULT  Goal: Absence of fever/infection during neutropenic period  Description: INTERVENTIONS:  - Monitor WBC    Outcome: Completed

## 2023-07-25 NOTE — ASSESSMENT & PLAN NOTE
Malnutrition Findings:   Adult Malnutrition type: Chronic illness  Adult Degree of Malnutrition: Other severe protein calorie malnutrition  Malnutrition Characteristics: Fat loss, Muscle loss, Inadequate energy, Weight loss      360 Statement: Severe protein/calorie malnutrition r/t catabolic illness as evidenced by 13% unplanned weight loss since 1/23/23, consuming < 75% energy intake compared to estimated energy needs, fat/muscle depletion of orbital/temple areas, BMI 14.5. Treated with Ensure Compact bid    BMI Findings:  Adult BMI Classifications: Underweight < 18.5       Body mass index is 14.66 kg/m².

## 2023-07-25 NOTE — ASSESSMENT & PLAN NOTE
· Had a goals of care discussion by palliative care and pulmonology today  · Patient and family present.   Patient is currently DNR/DNI  · Patient is interested in going home

## 2023-07-25 NOTE — ASSESSMENT & PLAN NOTE
Wt Readings from Last 3 Encounters:   07/24/23 42.6 kg (94 lb)   07/21/23 44.5 kg (98 lb 3.2 oz)   06/26/23 43.8 kg (96 lb 8 oz)     · Was on IV Lasix - transitioned to Torsemide 40 mg daily by cardiology today  · Euvolemic at present  · Monitor I/O, daily weights

## 2023-07-25 NOTE — PROGRESS NOTES
[facility]  Progress Note  Name: Bella Grover  MRN: [id number]  Unit/Bed#: [facility]PHP 525-01 I Date of Admission: 7/22/2023   Date of Service: 7/25/2023 I Hospital Day: 3    Assessment/Plan   * End-stage COPD with acute exacerbation [facility])  Assessment & Plan  · Admitted from 7/19 to 7/21 with acute exacerbation and discharged on steroid taper. On O2 at 3L and Prednisone 5 mg at baseline  · Developed acute shortness of breath on 7/22 while sitting in the kitchen  · Per EMS, patient was hypoxic at 60%, placed on BiPAP on route, and was given Solu-Medrol and DuoNeb on route. · Seen by pulm - Solumedrol decreased to 40 mg IV q 12 h,, on Xopenex/Atrovent/Pulmicort/Formoterol nebs  · Discussed with pulm - was taken off Bipap due to nausea and placed on HFNC for comfort, Bipap hs if not vomiting,   · Discussed with palliative care -had a family meeting today along with pulmonology. Patient was made DNR/DNI  · Currently on high flow oxygen-needs to be weaned down before the patient can be discharged    Chronic hypercapnia/KEVIN  Assessment & Plan  · BiPAP hs and prn  · Currently on high flow oxygen    Severe protein-calorie malnutrition ([address])  Assessment & Plan  Malnutrition Findings:   Adult Malnutrition type: Chronic illness  Adult Degree of Malnutrition: Other severe protein calorie malnutrition  Malnutrition Characteristics: Fat loss, Muscle loss, Inadequate energy, Weight loss      360 Statement: Severe protein/calorie malnutrition r/t catabolic illness as evidenced by 13% unplanned weight loss since 1/23/23, consuming < 75% energy intake compared to estimated energy needs, fat/muscle depletion of orbital/temple areas, BMI 14.5. Treated with Ensure Compact bid    BMI Findings:  Adult BMI Classifications: Underweight < 18.5       Body mass index is 14.66 kg/m².          Hyperglycemia  Assessment & Plan  Last blood glucose 304   Does not have formal diagnosis of diabetes  Likely secondary to steroids. Hemoglobin A1c is 5.7    Hyponatremia  Assessment & Plan  · Chronic hyponatremia noted, sodium at baseline    Chronic HFrEF (heart failure with reduced ejection fraction) ([facility])  Assessment & Plan  Wt Readings from Last 3 Encounters:   07/25/23 42.5 kg (93 lb 9.6 oz)   07/21/23 44.5 kg (98 lb 3.2 oz)   06/26/23 43.8 kg (96 lb 8 oz)     · Was on IV Lasix - transitioned to Torsemide 40 mg daily by cardiology Monday  · Euvolemic at present  · Monitor I/O, daily weights      Acute on chronic respiratory failure with hypoxia ([address])  Assessment & Plan  · Due to above  · On home O2 at 3 L  · HFNC as above-attempt to wean off oxygen as tolerated    Prostate cancer [facility])  Assessment & Plan  · Outpatient follow-up    Goals of care, counseling/discussion  Assessment & Plan  · Had a goals of care discussion by palliative care and pulmonology today  · Patient and family present. Patient is currently DNR/DNI  · Patient is interested in going home    Prediabetes  Assessment & Plan  · HbA1c 5.7 on 7/20/23  · Monitor blood sugars on steroids           VTE Pharmacologic Prophylaxis: VTE Score: 6 Moderate Risk (Score 3-4) - Pharmacological DVT Prophylaxis Ordered: heparin. Patient Centered Rounds: I performed bedside rounds with nursing staff today. Discussions with Specialists or Other Care Team Provider:     Education and Discussions with Family / Patient: Had a family meeting today. Total Time Spent on Date of Encounter in care of patient: 35 minutes This time was spent on one or more of the following: performing physical exam; counseling and coordination of care; obtaining or reviewing history; documenting in the medical record; reviewing/ordering tests, medications or procedures; communicating with other healthcare professionals and discussing with patient's family/caregivers.     Current Length of Stay: 3 day(s)  Current Patient Status: Inpatient   Certification Statement: The patient will continue to require additional inpatient hospital stay due to Acute hypoxic respiratory failure requiring high flow  Discharge Plan:    Code Status: Level 3 - DNAR and DNI    Subjective:   Patient seen and examined. Patient reported that he wants to go home. Informed the patient that he is on 50 L of oxygen and unable to send him home on the current level of oxygen. Objective:     Vitals:   Temp (24hrs), Av.8 °F (36.6 °C), Min:97.6 °F (36.4 °C), Max:98.6 °F (37 °C)    Temp:  [97.6 °F (36.4 °C)-98.6 °F (37 °C)] 98.6 °F (37 °C)  HR:  [] 96  Resp:  [20-22] 22  BP: ()/(60-73) 99/60  SpO2:  [92 %-98 %] 93 %  Body mass index is 14.66 kg/m². Input and Output Summary (last 24 hours): Intake/Output Summary (Last 24 hours) at 2023 1755  Last data filed at 2023 1601  Gross per 24 hour   Intake 880 ml   Output 1255 ml   Net -375 ml       Physical Exam:   Physical Exam  Constitutional:       Comments: Chronically ill and cachectic appearing male   HENT:      Head: Normocephalic. Nose: Nose normal.   Eyes:      General: No scleral icterus. Cardiovascular:      Rate and Rhythm: Regular rhythm. Pulmonary:      Breath sounds: No wheezing or rhonchi. Comments: Decreased breath sounds bilateral  Abdominal:      General: There is no distension. Palpations: There is no mass. Musculoskeletal:         General: Normal range of motion. Skin:     General: Skin is warm. Neurological:      Mental Status: He is alert and oriented to person, place, and time. Mental status is at baseline.           Additional Data:     Labs:  Results from last 7 days   Lab Units 23  0524 23  1948 23  1539   WBC Thousand/uL 6.08  --  11.54*   HEMOGLOBIN g/dL 11.5*  --  12.0   HEMATOCRIT % 37.3  --  39.1   PLATELETS Thousands/uL 185   < > 216   NEUTROS PCT %  --   --  94*   LYMPHS PCT %  --   --  2*   MONOS PCT %  --   --  3*   EOS PCT %  --   --  0    < > = values in this interval not displayed. Results from last 7 days   Lab Units 23  0437 23  0524   SODIUM mmol/L 131* 131*   POTASSIUM mmol/L 4.0 4.9   CHLORIDE mmol/L 79* 81*   CO2 mmol/L >45* >45*   BUN mg/dL 28* 22   CREATININE mg/dL 0.78 0.67   CALCIUM mg/dL 9.0 9.5   ALBUMIN g/dL  --  3.4*   TOTAL BILIRUBIN mg/dL  --  0.70   ALK PHOS U/L  --  56   ALT U/L  --  25   AST U/L  --  31   GLUCOSE RANDOM mg/dL 319* 125     Results from last 7 days   Lab Units 23  0618   INR  0.78*     Results from last 7 days   Lab Units 23  1552 23  1045 23  0611 23  2230 23  1604 23  1051 23  0729 23  2102 23  1546 23  1038 23  0525 23  2356   POC GLUCOSE mg/dl 262* 371* 248* 119 146* 289* 232* 193* 306* 305* 137 119     Results from last 7 days   Lab Units 23  0516   HEMOGLOBIN A1C % 5.7*     Results from last 7 days   Lab Units 23  0516 23  0828   PROCALCITONIN ng/ml 0.06 0.05       Lines/Drains:  Invasive Devices     Peripheral Intravenous Line  Duration           Peripheral IV 23 Distal;Left;Upper;Ventral (anterior) Arm 3 days    Peripheral IV 23 Dorsal (posterior); Left Forearm 3 days          Drain  Duration           External Urinary Catheter Medium <1 day                  Telemetry:  Telemetry Orders (From admission, onward)             24 Hour Telemetry Monitoring  Continuous x 24 Hours (Telem)           Question:  Reason for 24 Hour Telemetry  Answer:  Acute respiratory failure on BiPAP                 Telemetry Reviewed: Normal Sinus Rhythm  Indication for Continued Telemetry Use: No indication for continued use. Will discontinue.               Imaging: Reviewed radiology reports from this admission including: chest xray and chest CT scan    Recent Cultures (last 7 days):         Last 24 Hours Medication List:   Current Facility-Administered Medications   Medication Dose Route Frequency Provider Last Rate   • azithromycin 500 mg Oral Q24H Bear Younger MD     • budesonide  0.5 mg Nebulization Q12H Lakisha Chacon MD     • docusate sodium  100 mg Oral BID Natalie Cavanaugh MD     • formoterol  20 mcg Nebulization Q12H Lakisha Chacon MD     • heparin (porcine)  5,000 Units Subcutaneous Q8H Barrington Meigs, MD     • insulin lispro  1-6 Units Subcutaneous 4x Daily (AC & HS) Natalie Cavanaugh MD     • ipratropium  0.5 mg Nebulization TID Sherri Commander, DO     • levalbuterol  1.25 mg Nebulization TID Sherri Commander, DO     • methylPREDNISolone sodium succinate  40 mg Intravenous Q12H Chris Garibay MD     • ondansetron  4 mg Intravenous Q6H PRN Lakisha Chacon MD     • senna  1 tablet Oral HS Natalie Cavanaugh MD     • torsemide  40 mg Oral Daily Cindy Perez          Today, Patient Was Seen By: Jem Pineda MD    **Please Note: This note may have been constructed using a voice recognition system. **

## 2023-07-26 LAB
BUN SERPL-MCNC: 28 MG/DL (ref 5–25)
CALCIUM SERPL-MCNC: 9 MG/DL (ref 8.3–10.1)
CHLORIDE SERPL-SCNC: 78 MMOL/L (ref 96–108)
CO2 SERPL-SCNC: >45 MMOL/L (ref 21–32)
CREAT SERPL-MCNC: 0.81 MG/DL (ref 0.6–1.3)
ERYTHROCYTE [DISTWIDTH] IN BLOOD BY AUTOMATED COUNT: 12.8 % (ref 11.6–15.1)
GFR SERPL CREATININE-BSD FRML MDRD: 92 ML/MIN/1.73SQ M
GLUCOSE SERPL-MCNC: 180 MG/DL (ref 65–140)
GLUCOSE SERPL-MCNC: 237 MG/DL (ref 65–140)
GLUCOSE SERPL-MCNC: 242 MG/DL (ref 65–140)
GLUCOSE SERPL-MCNC: 250 MG/DL (ref 65–140)
GLUCOSE SERPL-MCNC: 277 MG/DL (ref 65–140)
HCT VFR BLD AUTO: 35.7 % (ref 36.5–49.3)
HGB BLD-MCNC: 10.9 G/DL (ref 12–17)
MCH RBC QN AUTO: 31 PG (ref 26.8–34.3)
MCHC RBC AUTO-ENTMCNC: 30.5 G/DL (ref 31.4–37.4)
MCV RBC AUTO: 101 FL (ref 82–98)
PLATELET # BLD AUTO: 182 THOUSANDS/UL (ref 149–390)
PMV BLD AUTO: 10 FL (ref 8.9–12.7)
POTASSIUM SERPL-SCNC: 3.4 MMOL/L (ref 3.5–5.3)
RBC # BLD AUTO: 3.52 MILLION/UL (ref 3.88–5.62)
SODIUM SERPL-SCNC: 131 MMOL/L (ref 135–147)
WBC # BLD AUTO: 8.94 THOUSAND/UL (ref 4.31–10.16)

## 2023-07-26 PROCEDURE — 99232 SBSQ HOSP IP/OBS MODERATE 35: CPT | Performed by: INTERNAL MEDICINE

## 2023-07-26 PROCEDURE — 82948 REAGENT STRIP/BLOOD GLUCOSE: CPT

## 2023-07-26 PROCEDURE — 85027 COMPLETE CBC AUTOMATED: CPT | Performed by: FAMILY MEDICINE

## 2023-07-26 PROCEDURE — 99232 SBSQ HOSP IP/OBS MODERATE 35: CPT | Performed by: FAMILY MEDICINE

## 2023-07-26 PROCEDURE — 94640 AIRWAY INHALATION TREATMENT: CPT

## 2023-07-26 PROCEDURE — 94660 CPAP INITIATION&MGMT: CPT

## 2023-07-26 PROCEDURE — 94003 VENT MGMT INPAT SUBQ DAY: CPT

## 2023-07-26 PROCEDURE — 80048 BASIC METABOLIC PNL TOTAL CA: CPT | Performed by: FAMILY MEDICINE

## 2023-07-26 PROCEDURE — 94760 N-INVAS EAR/PLS OXIMETRY 1: CPT

## 2023-07-26 RX ORDER — POTASSIUM CHLORIDE 20 MEQ/1
40 TABLET, EXTENDED RELEASE ORAL ONCE
Status: COMPLETED | OUTPATIENT
Start: 2023-07-26 | End: 2023-07-26

## 2023-07-26 RX ORDER — POTASSIUM CHLORIDE 20 MEQ/1
20 TABLET, EXTENDED RELEASE ORAL DAILY
Status: DISCONTINUED | OUTPATIENT
Start: 2023-07-26 | End: 2023-07-29 | Stop reason: HOSPADM

## 2023-07-26 RX ADMIN — BUDESONIDE 0.5 MG: 0.5 INHALANT ORAL at 20:03

## 2023-07-26 RX ADMIN — IPRATROPIUM BROMIDE 0.5 MG: 0.5 SOLUTION RESPIRATORY (INHALATION) at 08:10

## 2023-07-26 RX ADMIN — DOCUSATE SODIUM 100 MG: 100 CAPSULE ORAL at 08:32

## 2023-07-26 RX ADMIN — INSULIN LISPRO 1 UNITS: 100 INJECTION, SOLUTION INTRAVENOUS; SUBCUTANEOUS at 22:50

## 2023-07-26 RX ADMIN — HEPARIN SODIUM 5000 UNITS: 5000 INJECTION INTRAVENOUS; SUBCUTANEOUS at 14:26

## 2023-07-26 RX ADMIN — INSULIN LISPRO 4 UNITS: 100 INJECTION, SOLUTION INTRAVENOUS; SUBCUTANEOUS at 11:29

## 2023-07-26 RX ADMIN — AZITHROMYCIN DIHYDRATE 500 MG: 250 TABLET, FILM COATED ORAL at 15:57

## 2023-07-26 RX ADMIN — BUDESONIDE 0.5 MG: 0.5 INHALANT ORAL at 08:10

## 2023-07-26 RX ADMIN — POTASSIUM CHLORIDE 40 MEQ: 1500 TABLET, EXTENDED RELEASE ORAL at 09:38

## 2023-07-26 RX ADMIN — TORSEMIDE 40 MG: 20 TABLET ORAL at 08:32

## 2023-07-26 RX ADMIN — SENNOSIDES 8.6 MG: 8.6 TABLET, FILM COATED ORAL at 20:13

## 2023-07-26 RX ADMIN — METHYLPREDNISOLONE SODIUM SUCCINATE 40 MG: 40 INJECTION, POWDER, FOR SOLUTION INTRAMUSCULAR; INTRAVENOUS at 20:14

## 2023-07-26 RX ADMIN — HEPARIN SODIUM 5000 UNITS: 5000 INJECTION INTRAVENOUS; SUBCUTANEOUS at 05:10

## 2023-07-26 RX ADMIN — IPRATROPIUM BROMIDE 0.5 MG: 0.5 SOLUTION RESPIRATORY (INHALATION) at 14:21

## 2023-07-26 RX ADMIN — FORMOTEROL FUMARATE DIHYDRATE 20 MCG: 20 SOLUTION RESPIRATORY (INHALATION) at 08:10

## 2023-07-26 RX ADMIN — METHYLPREDNISOLONE SODIUM SUCCINATE 40 MG: 40 INJECTION, POWDER, FOR SOLUTION INTRAMUSCULAR; INTRAVENOUS at 08:32

## 2023-07-26 RX ADMIN — INSULIN LISPRO 3 UNITS: 100 INJECTION, SOLUTION INTRAVENOUS; SUBCUTANEOUS at 15:58

## 2023-07-26 RX ADMIN — HEPARIN SODIUM 5000 UNITS: 5000 INJECTION INTRAVENOUS; SUBCUTANEOUS at 22:51

## 2023-07-26 RX ADMIN — LEVALBUTEROL HYDROCHLORIDE 1.25 MG: 1.25 SOLUTION RESPIRATORY (INHALATION) at 14:21

## 2023-07-26 RX ADMIN — LEVALBUTEROL HYDROCHLORIDE 1.25 MG: 1.25 SOLUTION RESPIRATORY (INHALATION) at 20:03

## 2023-07-26 RX ADMIN — FORMOTEROL FUMARATE DIHYDRATE 20 MCG: 20 SOLUTION RESPIRATORY (INHALATION) at 20:03

## 2023-07-26 RX ADMIN — POTASSIUM CHLORIDE 20 MEQ: 1500 TABLET, EXTENDED RELEASE ORAL at 15:57

## 2023-07-26 RX ADMIN — INSULIN LISPRO 3 UNITS: 100 INJECTION, SOLUTION INTRAVENOUS; SUBCUTANEOUS at 07:36

## 2023-07-26 RX ADMIN — DOCUSATE SODIUM 100 MG: 100 CAPSULE ORAL at 17:22

## 2023-07-26 RX ADMIN — LEVALBUTEROL HYDROCHLORIDE 1.25 MG: 1.25 SOLUTION RESPIRATORY (INHALATION) at 08:10

## 2023-07-26 RX ADMIN — IPRATROPIUM BROMIDE 0.5 MG: 0.5 SOLUTION RESPIRATORY (INHALATION) at 20:03

## 2023-07-26 NOTE — ASSESSMENT & PLAN NOTE
Malnutrition Findings:   Adult Malnutrition type: Chronic illness  Adult Degree of Malnutrition: Other severe protein calorie malnutrition  Malnutrition Characteristics: Fat loss, Muscle loss, Inadequate energy, Weight loss      360 Statement: Severe protein/calorie malnutrition r/t catabolic illness as evidenced by 13% unplanned weight loss since 1/23/23, consuming < 75% energy intake compared to estimated energy needs, fat/muscle depletion of orbital/temple areas, BMI 14.5. Treated with Ensure Compact bid    BMI Findings:  Adult BMI Classifications: Underweight < 18.5       Body mass index is 14.78 kg/m².

## 2023-07-26 NOTE — ASSESSMENT & PLAN NOTE
Wt Readings from Last 3 Encounters:   07/26/23 42.8 kg (94 lb 5.7 oz)   07/21/23 44.5 kg (98 lb 3.2 oz)   06/26/23 43.8 kg (96 lb 8 oz)     · Was on IV Lasix - transitioned to Torsemide 40 mg daily by cardiology Monday  · Euvolemic at present  · Monitor I/O, daily weights  · Cardiology on board

## 2023-07-26 NOTE — ASSESSMENT & PLAN NOTE
· Had a goals of care discussion by palliative care and pulmonology today  · Patient and family present.   Patient is currently DNR/DNI  · Patient is interested in going home-currently on high flow-plan is to wean down oxygen as tolerated to mid flow to allow the patient to go home-May need Oxymizer

## 2023-07-26 NOTE — UTILIZATION REVIEW
Initial Clinical Review    Admission: Date/Time/Statement:   Admission Orders (From admission, onward)     Ordered        07/22/23 1747  INPATIENT ADMISSION  Once                      Orders Placed This Encounter   Procedures   • INPATIENT ADMISSION     Standing Status:   Standing     Number of Occurrences:   1     Order Specific Question:   Level of Care     Answer:   Level 2 Stepdown / HOT [14]     Order Specific Question:   Estimated length of stay     Answer:   More than 2 Midnights     Order Specific Question:   Certification     Answer:   I certify that inpatient services are medically necessary for this patient for a duration of greater than two midnights. See H&P and MD Progress Notes for additional information about the patient's course of treatment. ED Arrival Information     Expected   -    Arrival   7/22/2023 14:46    Acuity   Emergent            Means of arrival   Ambulance    Escorted by   [facility]    Service   Hospitalist    Admission type   Emergency            Arrival complaint   Respiratory Distress           Chief Complaint   Patient presents with   • Respiratory Distress     Pt arrived via EMS for respiratory distress. Per EMS RA O2 sats 60s. Pt arrived on bipap. B/L LE swelling. Initial Presentation: 79 y.o. male to ED, return to hospital today for sudden onset of dyspnea while sitting in the kitchen this am. Pt recent hospitalization from 7/19 to 7/21/23, discharge home on O2 3L NC. Wife also noted bilateral leg edema. In EMS, pt was hypoxic in 60s and placed on BIPAP on route, given Iv Solu-Medrol and Duoneb prior to arrival. In ED, BMP showed elevated CO2 and elevated BNP. VBG reveals patient is hypercapnic with PCO2 106.3. On exam; pt somnolent, on continuous BiPAP, severely ill appearing and in respiratory distress. On BiPAP, saturating at 98%. Wakes up with sternal rub however quickly falls back asleep.  PMH forend-stage COPD, chronic respiratory failure with hypoxia and hypercapnia, KEVIN, combined CHF. Admit Inpatient level of care for Acute on chronic respiratory failure with hypoxia, Acute on chronic HFrEF, End-stage COPD with acute exacerbation. Hyperglycemia and Hyponatremia. Continue Iv Solu-medrol 40 mg q8h, Xopenex/Atrovent 3 times daily, Pulmicort nebulizer. Start Iv Lasix 40 mg Bid. Hold all po meds while on BiPAP. Prognosis poor. Pulmonology consult. Las bld glucose 304, place on SSI. Chronic hyponatremia, na 129 at baseline. Pt currently DNI/DNI. Date: 7/23   Day 2:   Plumonology cons; Xopenex/Atrovent TID. Budesonide/Formoterol q12. Solumedrol 40 mg IV q8. Wean O2 down for SPO2 goal >88%. BiPAP qHS and PRN. OK to hold off on antibiotics given recent course. Consider adding diamox. Discussed that hospice is most appropriate. Palliative Care cons; Pt  has expressed his desire to not engage frequently in goals discussions, as he recognizes the severity of his illness. Unfortunately, he also expressed his desire to have heroic resuscitative measures today so such discussions were necessary - including counseling on risks of harm for limited benefit of CPR, likelihood of needing to remain on a ventilator if intubated, etc.  Patient accepting of discussion and clearly states that he is amenable to no-CPR status but wants intubation and ventilation if indicated. He would then want his wife to make any decisions about withdrawal of care (this causes his wife some emotional stress, but she states she will follow his wishes). Symptom management. Progress notes: Continue Iv Solu-medrol q8h. Xopenex/Atrovent/Pulmicort/Formoterol nebs. On Iv Lasix 40 mg bid. Cardiology consult. Currently on O2 6L NC. Somnolence resolved after period of continuous BiPap. Shortness of breath improved. On exam; decreased breath sounds bilaterally. 7/24  Heart Failure cons; End-stage COPD with acute exacerbation / chronic respiratory failure with hypoxia and hypercapnia. Transition to torsemide 40mg daily and monitor response  Ongoing [facility] discussions led by palliative and pulmonology teams.  Not a candidate for advanced heart failure therapies    ED Triage Vitals   Temperature Pulse Respirations Blood Pressure SpO2   07/22/23 1449 07/22/23 1449 07/22/23 1449 07/22/23 1449 07/22/23 1449   97.9 °F (36.6 °C) (!) 110 (!) 25 116/77 98 %      Temp Source Heart Rate Source Patient Position - Orthostatic VS BP Location FiO2 (%)   07/22/23 1449 07/22/23 1449 07/22/23 2100 07/22/23 1449 07/24/23 1725   Rectal Monitor Lying Right arm 30      Pain Score       07/22/23 1449       No Pain          Wt Readings from Last 1 Encounters:   07/26/23 42.8 kg (94 lb 5.7 oz)     Additional Vital Signs:    07/23/23 0800 07/23/23 07:49:15 07/23/23 0716 07/23/23 0501 07/23/23 0257   Vital Signs   Temp -- 98.1 °F (36.7 °C)  -DI -- -- --   Temp src -- Axillary  -CM -- -- --   Pulse -- 81  -CM 79  -JG -- 83  -NH   Heart Rate Source -- Monitor  -CM -- -- --   Resp -- 17  -DI 28 Abnormal   -JG -- --   BP -- 107/65  -DI -- -- 102/64  -NH   MAP (mmHg) -- -- -- -- 78  -NH   BP Location -- Right arm  -AFA -- -- Right arm  -NH   BP Method -- Automatic  -AFA -- -- Automatic  -NH   Patient Position - Orthostatic VS -- Lying  -AFA -- -- Lying  -NH   Oxygen Therapy   SpO2 -- 96 %  -DI 96 %  -JG -- 98 %  -NH   SpO2 Activity At Rest  -CM At Rest  -CM -- -- At Rest  -NH   O2 Device BiPAP  -CM BiPAP  -CM -- -- BiPAP  -NH      07/22/23 1545 07/22/23 1530 07/22/23 1515 07/22/23 1500 07/22/23 1449   Vital Signs   Temp -- -- -- -- 97.9 °F (36.6 °C)  -MO   Temp src -- -- -- -- Rectal  -MO   Pulse 88  -BL 90  -BL 99  - Abnormal   - Abnormal   -MO   Heart Rate Source Monitor  -BL Monitor  -BL Monitor  -BL Monitor  -BL Monitor  -MO   Resp 21  -BL 33 Abnormal   -BL 39 Abnormal   -BL 29 Abnormal   -BL 25 Abnormal   -MO   BP 95/61  -/72  -/65  -/73  -/77  -MO   MAP (mmHg) 74  -BL 87  -BL 86  -BL 92  -BL --   BP Location Right arm  -BL Right arm  -BL Right arm  -BL Right arm  -BL Right arm  -MO   BP Method Automatic  -BL Automatic  -BL Automatic  -BL Automatic  -BL Automatic  -MO   Oxygen Therapy   SpO2 96 %  - %  - %  -BL 99 %  -BL 98 %  -MO   SpO2 Activity At Rest  -BL At Rest  -BL At Rest  -BL At Rest  -BL At Rest  -MO   O2 Device BiPAP  -BL BiPAP  -BL BiPAP  -BL BiPAP  -BL BiPAP  -MO       Pertinent Labs/Diagnostic Test Results:   XR chest portable   ED Interpretation by Josh Kamara DO (07/22 1801)   Chest x-ray interpreted by me shows slight increased pulm vascular congestion when compared with July 19, 2023. No focal infiltrate or pneumothorax      Final Result by Dinorah Vilchis MD (07/23 1011)      Emphysema with mild pulmonary venous congestion.                Workstation performed: [id number]               Results from last 7 days   Lab Units 07/23/23  0524 07/22/23  1948 07/22/23  1539   WBC Thousand/uL 6.08  --  11.54*   HEMOGLOBIN g/dL 11.5*  --  12.0   HEMATOCRIT % 37.3  --  39.1   PLATELETS Thousands/uL 185 182 216   NEUTROS ABS Thousands/µL  --   --  10.94*         Lab Units 07/23/23  0524 07/22/23  1539   SODIUM mmol/L 131* 129*   POTASSIUM mmol/L 4.9 5.2   CHLORIDE mmol/L 81* 78*   CO2 mmol/L >45* >45*   BUN mg/dL 22 22   CREATININE mg/dL 0.67 0.77   EGFR ml/min/1.73sq m 100 94   CALCIUM mg/dL 9.5 9.5   MAGNESIUM mg/dL  --   --      Lab Units 07/23/23  0524 07/22/23  1539   AST U/L 31 34   ALT U/L 25 27   ALK PHOS U/L 56 65   TOTAL PROTEIN g/dL 6.2* 6.9   ALBUMIN g/dL 3.4* 3.7   TOTAL BILIRUBIN mg/dL 0.70 0.72     Results from last 7 days   Lab Units 07/23/23  2102 07/23/23  1546   POC GLUCOSE mg/dl 193* 306*     Lab Units 07/23/23  0524 07/22/23  1539   GLUCOSE RANDOM mg/dL 125 292*         Results from last 7 days   Lab Units 07/22/23  1948 07/22/23  1751 07/22/23  1539   HS TNI 0HR ng/L  --   --  36   HS TNI 2HR ng/L  --  75*  --    HSTNI D2 ng/L  --  39*  --    HS TNI 4HR ng/L 112*  --   --    HSTNI D4 ng/L 76*  --   --      Results from last 7 days   Lab Units 07/22/23  1539   D-DIMER QUANTITATIVE ug/ml FEU <0.27               Lab Units 07/22/23  1539   BNP pg/mL 677*         ED Treatment:   Medication Administration from 07/22/2023 1446 to 07/22/2023 1858    None     Past Medical History:   Diagnosis Date   • Acute metabolic encephalopathy 96/05/9901   • Arthritis    • Bladder mass    • Cardiomyopathy [facility])    • Chest pain    • COPD (chronic obstructive pulmonary disease) ([facility])    • COVID-19 virus infection 02/23/2023   • CPAP (continuous positive airway pressure) dependence    • Emphysema of lung ([facility])    • Hypoxia     nocturnal   • Left bundle branch block    • Multiple pulmonary nodules     last assessed: 10/12/16   • Pneumonia    • Sleep apnea, obstructive    • Smoker    • Weight loss 08/29/2019     Present on Admission:  • Acute on chronic respiratory failure with hypoxia (HCC)  • Chronic hypercapnia/KEVIN  • End-stage COPD with acute exacerbation ([facility])  • Severe protein-calorie malnutrition (HCC)  • Hyponatremia  • Hyperglycemia  • Prediabetes  • Prostate cancer (HCC)  • Chronic HFrEF (heart failure with reduced ejection fraction) ([facility])  • Pulmonary cachexia due to COPD [facility])      Admitting Diagnosis: Chronic hyponatremia [E87.1]  Hypercarbia [R06.89]  Hypoxia [R09.02]  COPD exacerbation (HCC) [J44.1]  Right-sided congestive heart failure secondary to left-sided congestive heart failure (HCC) [I50.814]  Age/Sex: 79 y.o. male     Admission Orders:  Scheduled Medications:  azithromycin, 500 mg, Oral, Q24H  budesonide, 0.5 mg, Nebulization, Q12H  docusate sodium, 100 mg, Oral, BID  formoterol, 20 mcg, Nebulization, Q12H  heparin (porcine), 5,000 Units, Subcutaneous, Q8H [address]  insulin lispro, 1-6 Units, Subcutaneous, 4x Daily (AC & HS)  ipratropium, 0.5 mg, Nebulization, TID  levalbuterol, 1.25 mg, Nebulization, TID  methylPREDNISolone sodium succinate, 40 mg, Intravenous, Q8H [address]  potassium chloride, 20 mEq, Oral, Daily  senna, 1 tablet, Oral, HS      furosemide (LASIX) injection 40 mg  Dose: 40 mg  Freq: 2 times daily (diuretic) Route: IV  Start: 07/23/23 0800       Continuous IV Infusions: None     PRN Meds:  ondansetron, 4 mg, Intravenous, Q6H PRN      Tele monitoring  IP CONSULT TO PULMONOLOGY  IP CONSULT TO PALLIATIVE CARE  IP CONSULT TO CARDIOLOGY  IP CONSULT TO NUTRITION SERVICES    Network Utilization Review Department  ATTENTION: Please call with any questions or concerns to [phone] and carefully listen to the prompts so that you are directed to the right person. All voicemails are confidential.  Angie Meehan all requests for admission clinical reviews, approved or denied determinations and any other requests to dedicated fax number below belonging to the campus where the patient is receiving treatment.  List of dedicated fax numbers for the Facilities:  [place] DENIALS (Administrative/Medical Necessity) [phone] [address] (Maternity/NICU/Pediatrics) [phone]   [address] [place] [address] [phone]   [facility] [address] [facility] [phone]   [address] [place] [address] [address] [address] [facility] [phone]   [address] [facility] [address] [facility] [address]  [phone]

## 2023-07-26 NOTE — UTILIZATION REVIEW
NOTIFICATION OF INPATIENT ADMISSION   AUTHORIZATION REQUEST   SERVICING FACILITY:   [address] [place] [address]  Address: [address] [place] [address] [address] [facility] [address] [address]  Tax ID: [id number]  NPI: [id number] ATTENDING PROVIDER:  Attending Name and NPI#: Елена James [place] [[id number]]  Address: [address] [place] [address] Place [address]  Phone: [phone]   ADMISSION INFORMATION:  [facility]: [facility]patient [address] [facility]   Place of Service Code: 21  Inpatient Admission Date/Time: 7/22/23  5:47 PM  Discharge Date/Time: No discharge date for patient encounter. Admitting Diagnosis Code/Description:  Chronic hyponatremia [E87.1]  Hypercarbia [R06.89]  Hypoxia [R09.02]  COPD exacerbation ([address]) [J44.1]  Right-sided congestive heart failure secondary to left-sided congestive heart failure ([address]) [I50.814]     UTILIZATION REVIEW CONTACT:  Vern Wiggins Utilization   [facility] Utilization Review Department  Phone: [phone]  Fax: [phone]  Email: [email or web address]  Contact for approvals/pending authorizations, clinical reviews, and discharge. PHYSICIAN ADVISORY SERVICES:  Medical Necessity Denial & Wndt-xz-Hgol Review  Phone: [phone]  Fax: [phone]  Email: [email or web address]

## 2023-07-26 NOTE — ASSESSMENT & PLAN NOTE
· Admitted from 7/19 to 7/21 with acute exacerbation and discharged on steroid taper. On O2 at 3L and Prednisone 5 mg at baseline  · Developed acute shortness of breath on 7/22 while sitting in the kitchen  · Per EMS, patient was hypoxic at 60%, placed on BiPAP on route, and was given Solu-Medrol and DuoNeb on route. · Seen by pulm - Solumedrol decreased to 40 mg IV q 12 h,, on Xopenex/Atrovent/Pulmicort/Formoterol nebs  · Discussed with pulm - was taken off Bipap due to nausea and placed on HFNC for comfort, Bipap hs if not vomiting,   · Discussed with palliative care -had a family meeting today along with pulmonology.   Patient was made DNR/DNI  · Currently on high flow oxygen-needs to be weaned down before the patient can be discharged-weaning down on the oxygen as tolerated

## 2023-07-26 NOTE — PROGRESS NOTES
Progress Note - Pulmonary   Rose Mary Ramirez. 79 y.o. 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 Placed on high flow for comfort, may use when awake and use BiPAP when sleeping as long as no nausea/vomiting. Fan on face helps his breathing sensation. Starting 3 day course of azithromycin 500mg (EOT 7/27), qtc 485  Diuresis per primary team. Weight has increased 0.3kg since yesterday based on bed weight reading though exam is euvolemic. Now switched to torsemide 40mg. Ongoing goals of care discussions.  Several discussions regarding hospice and code status. As of yesterday he voiced being tired and ready to stop everything, family meeting this morning. After family meeting today he is now DNR DNI. We will continue providing every treatment to try to get him home one more time and may reassess goals in the near future based on clinical response. Subjective:   Feels similar to yesterday. Slept well on BiPAP. No longer having n/v with meals, has good appetite. Denies CP/HA/fever/chills/abdominal pain. Continues to have SOB, though is comfortable on HFNC. Objective:   Vitals: Blood pressure 97/64, pulse 72, temperature 98 °F (36.7 °C), resp. rate 20, height 5' 7" (1.702 m), weight 42.8 kg (94 lb 5.7 oz), SpO2 95 %. In room was SpO2 93% on HFNC 36% FiO2 at 52L flow rate, Body mass index is 14.78 kg/m².       Intake/Output Summary (Last 24 hours) at 7/26/2023 0770  Last data filed at 7/26/2023 6514  Gross per 24 hour   Intake 1000 ml   Output 1630 ml   Net -630 ml Physical Exam  GEN: cachectic, ill-appearing  Heart: distant heart sounds difficult to auscultate in setting of loud wheezing, Bilateral LE edema 2+ to midshins, warm extremities  Lungs: diffuse expiratory wheezing, respiratory distress  Abdomen: soft, non-tender  Neuro: normal though content and mood    Labs: I have personally reviewed pertinent lab results.   Laboratory and Diagnostics  Results from last 7 days   Lab Units 07/26/23  0502 07/23/23  0524 07/22/23  1948 07/22/23  1539   WBC Thousand/uL 8.94 6.08  --  11.54*   HEMOGLOBIN g/dL 10.9* 11.5*  --  12.0   HEMATOCRIT % 35.7* 37.3  --  39.1   PLATELETS Thousands/uL 182 185 182 216   NEUTROS PCT %  --   --   --  94*   MONOS PCT %  --   --   --  3*   EOS PCT %  --   --   --  0     Results from last 7 days   Lab Units 07/26/23  0502 07/24/23  0437 07/23/23  0524 07/22/23  1539 07/21/23  0442 07/20/23  0516 07/19/23  1225   SODIUM mmol/L 131* 131* 131* 129* 131* 126* 123*   POTASSIUM mmol/L 3.4* 4.0 4.9 5.2 3.7 3.5 3.3*   CHLORIDE mmol/L 78* 79* 81* 78* 80* 73* 70*   CO2 mmol/L >45* >45* >45* >45* >45* >45* >45*   BUN mg/dL 28* 28* 22 22 21 19 14   CREATININE mg/dL 0.81 0.78 0.67 0.77 0.70 0.78 0.76   CALCIUM mg/dL 9.0 9.0 9.5 9.5 9.3 9.0 9.0   GLUCOSE RANDOM mg/dL 237* 319* 125 292* 202* 116 243*   ALT U/L  --   --  25 27  --  20  --    AST U/L  --   --  31 34  --  31  --    ALK PHOS U/L  --   --  56 65  --  57  --    ALBUMIN g/dL  --   --  3.4* 3.7  --  3.2*  --    TOTAL BILIRUBIN mg/dL  --   --  0.70 0.72  --  0.75  --      Results from last 7 days   Lab Units 07/24/23  0437 07/20/23  0516   MAGNESIUM mg/dL 2.3 2.3         Results from last 7 days   Lab Units 07/22/23  1539   D-DIMER QUANTITATIVE ug/ml FEU <0.27     Results from last 7 days   Lab Units 07/20/23  0516 07/19/23  0828   PROCALCITONIN ng/ml 0.06 0.05       ABG:   Results from last 7 days   Lab Units 07/20/23  1437   PH ART  7.438   PCO2 ART mm Hg 78.4*   PO2 ART mm Hg 75.7   HCO3 ART mmol/L 51.8*   BASE EXC ART mmol/L 23.3   ABG SOURCE  Radial, Left       Microbiology:  COVID/Flu/RSV negative 7/19/23    Imaging and other studies: I have personally reviewed pertinent reports. CXR 7/23:  Emphysema with mild pulmonary venous congestion. Eddie Ureña MD  Internal Medicine Residency PGY-I  19[address] [facility]: Portions of the record may have been created with voice recognition software. Occasional wrong word or "sound a like" substitutions may have occurred due to the inherent limitations of voice recognition software. Careful consideration should be taken to recognize, using context, where substitutions have occurred.

## 2023-07-26 NOTE — PROGRESS NOTES
[address] [facility]  Progress Note  Name: Aidan Chang  MRN: [id number]  Unit/Bed#: PPHP 525-01 I Date of Admission: 7/22/2023   Date of Service: 7/26/2023 I Hospital Day: 4    Assessment/Plan   * End-stage COPD with acute exacerbation [facility])  Assessment & Plan  · Admitted from 7/19 to 7/21 with acute exacerbation and discharged on steroid taper. On O2 at 3L and Prednisone 5 mg at baseline  · Developed acute shortness of breath on 7/22 while sitting in the kitchen  · Per EMS, patient was hypoxic at 60%, placed on BiPAP on route, and was given Solu-Medrol and DuoNeb on route. · Seen by pulm - Solumedrol decreased to 40 mg IV q 12 h,, on Xopenex/Atrovent/Pulmicort/Formoterol nebs  · Discussed with pulm - was taken off Bipap due to nausea and placed on HFNC for comfort, Bipap hs if not vomiting,   · Discussed with palliative care -had a family meeting today along with pulmonology. Patient was made DNR/DNI  · Currently on high flow oxygen-needs to be weaned down before the patient can be discharged-weaning down on the oxygen as tolerated    Pulmonary cachexia due to COPD [facility])  Assessment & Plan  Malnutrition Findings:   Adult Malnutrition type: Chronic illness  Adult Degree of Malnutrition: Other severe protein calorie malnutrition  Malnutrition Characteristics: Fat loss, Muscle loss, Inadequate energy, Weight loss         360 Statement: Severe protein/calorie malnutrition r/t catabolic illness as evidenced by 13% unplanned weight loss since 1/23/23, consuming < 75% energy intake compared to estimated energy needs, fat/muscle depletion of orbital/temple areas, BMI 14.5. Treated with Ensure Compact bid    BMI Findings:  Adult BMI Classifications: Underweight < 18.5        Body mass index is 14.78 kg/m².        Chronic hypercapnia/KEVIN  Assessment & Plan  · BiPAP hs and prn  · Currently on high flow oxygen-attempting weaning down on oxygen    Severe protein-calorie malnutrition [facility])  Assessment & Plan  Malnutrition Findings:   Adult Malnutrition type: Chronic illness  Adult Degree of Malnutrition: Other severe protein calorie malnutrition  Malnutrition Characteristics: Fat loss, Muscle loss, Inadequate energy, Weight loss      360 Statement: Severe protein/calorie malnutrition r/t catabolic illness as evidenced by 13% unplanned weight loss since 1/23/23, consuming < 75% energy intake compared to estimated energy needs, fat/muscle depletion of orbital/temple areas, BMI 14.5. Treated with Ensure Compact bid    BMI Findings:  Adult BMI Classifications: Underweight < 18.5       Body mass index is 14.78 kg/m². Hyperglycemia  Assessment & Plan  Last blood glucose 304   Does not have formal diagnosis of diabetes  Likely secondary to steroids. Hemoglobin A1c is 5.7    Hyponatremia  Assessment & Plan  · Chronic hyponatremia noted, sodium at baseline    Chronic HFrEF (heart failure with reduced ejection fraction) ([facility])  Assessment & Plan  Wt Readings from Last 3 Encounters:   07/26/23 42.8 kg (94 lb 5.7 oz)   07/21/23 44.5 kg (98 lb 3.2 oz)   06/26/23 43.8 kg (96 lb 8 oz)     · Was on IV Lasix - transitioned to Torsemide 40 mg daily by cardiology Monday  · Euvolemic at present  · Monitor I/O, daily weights  · Cardiology on board      Acute on chronic respiratory failure with hypoxia ([address])  Assessment & Plan  · Due to above  · On home O2 at 3 L  · HFNC as above-attempt to wean off oxygen as tolerated    Prostate cancer [facility])  Assessment & Plan  · Outpatient follow-up    Goals of care, counseling/discussion  Assessment & Plan  · Had a goals of care discussion by palliative care and pulmonology today  · Patient and family present.   Patient is currently DNR/DNI  · Patient is interested in going home-currently on high flow-plan is to wean down oxygen as tolerated to mid flow to allow the patient to go home-May need Oxymizer    Prediabetes  Assessment & Plan  · HbA1c 5.7 on 7/20/23  · Monitor blood sugars on steroids               VTE Pharmacologic Prophylaxis: VTE Score: 6 Moderate Risk (Score 3-4) - Pharmacological DVT Prophylaxis Ordered: heparin. Patient Centered Rounds: I performed bedside rounds with nursing staff today. Discussions with Specialists or Other Care Team Provider:    Education and Discussions with Family / Patient: Updated  (wife) at bedside. Total Time Spent on Date of Encounter in care of patient: 35 minutes This time was spent on one or more of the following: performing physical exam; counseling and coordination of care; obtaining or reviewing history; documenting in the medical record; reviewing/ordering tests, medications or procedures; communicating with other healthcare professionals and discussing with patient's family/caregivers. Current Length of Stay: 4 day(s)  Current Patient Status: Inpatient   Certification Statement: The patient will continue to require additional inpatient hospital stay due to COPD requiring high flow  Discharge Plan: Anticipate discharge in >72 hrs to home. Code Status: Level 3 - DNAR and DNI    Subjective:   Patient seen and examined. Patient still on high flow oxygen. Patient is interested in going home. Wife in the room. Family wants to bring the dog-    Objective:     Vitals:   Temp (24hrs), Av.9 °F (36.6 °C), Min:97 °F (36.1 °C), Max:98.5 °F (36.9 °C)    Temp:  [97 °F (36.1 °C)-98.5 °F (36.9 °C)] 98.5 °F (36.9 °C)  HR:  [] 90  Resp:  [18-22] 22  BP: ()/(59-68) 103/68  SpO2:  [90 %-97 %] 94 %  Body mass index is 14.78 kg/m². Input and Output Summary (last 24 hours): Intake/Output Summary (Last 24 hours) at 2023 1839  Last data filed at 2023 1738  Gross per 24 hour   Intake 600 ml   Output 700 ml   Net -100 ml       Physical Exam:   Physical Exam  Constitutional:       General: He is not in acute distress. Appearance: He is ill-appearing.       Comments: Chronically ill-appearing and cachectic male   HENT:      Head: Normocephalic. Nose: Nose normal.   Eyes:      General: No scleral icterus. Cardiovascular:      Rate and Rhythm: Normal rate and regular rhythm. Pulmonary:      Breath sounds: No wheezing. Comments: Decreased breath sounds bilateral  Abdominal:      General: There is no distension. Tenderness: There is no abdominal tenderness. Musculoskeletal:         General: Normal range of motion. Skin:     General: Skin is warm. Neurological:      Mental Status: Mental status is at baseline. Additional Data:     Labs:  Results from last 7 days   Lab Units 07/26/23  0502 07/22/23  1948 07/22/23  1539   WBC Thousand/uL 8.94   < > 11.54*   HEMOGLOBIN g/dL 10.9*   < > 12.0   HEMATOCRIT % 35.7*   < > 39.1   PLATELETS Thousands/uL 182   < > 216   NEUTROS PCT %  --   --  94*   LYMPHS PCT %  --   --  2*   MONOS PCT %  --   --  3*   EOS PCT %  --   --  0    < > = values in this interval not displayed. Results from last 7 days   Lab Units 07/26/23  0502 07/24/23  0437 07/23/23  0524   SODIUM mmol/L 131*   < > 131*   POTASSIUM mmol/L 3.4*   < > 4.9   CHLORIDE mmol/L 78*   < > 81*   CO2 mmol/L >45*   < > >45*   BUN mg/dL 28*   < > 22   CREATININE mg/dL 0.81   < > 0.67   CALCIUM mg/dL 9.0   < > 9.5   ALBUMIN g/dL  --   --  3.4*   TOTAL BILIRUBIN mg/dL  --   --  0.70   ALK PHOS U/L  --   --  56   ALT U/L  --   --  25   AST U/L  --   --  31   GLUCOSE RANDOM mg/dL 237*   < > 125    < > = values in this interval not displayed.          Results from last 7 days   Lab Units 07/26/23  1535 07/26/23  1042 07/26/23  0546 07/25/23  2044 07/25/23  1552 07/25/23  1045 07/25/23  0611 07/24/23  2230 07/24/23  1604 07/24/23  1051 07/24/23  0729 07/23/23  2102   POC GLUCOSE mg/dl 242* 277* 250* 192* 262* 371* 248* 119 146* 289* 232* 193*     Results from last 7 days   Lab Units 07/20/23  0516   HEMOGLOBIN A1C % 5.7*     Results from last 7 days   Lab Units 07/20/23  0516 PROCALCITONIN ng/ml 0.06       Lines/Drains:  Invasive Devices     Peripheral Intravenous Line  Duration           Peripheral IV 07/26/23 Right;Ventral (anterior) Forearm <1 day          Drain  Duration           External Urinary Catheter Medium 1 day                      Imaging: No pertinent imaging reviewed. Recent Cultures (last 7 days):         Last 24 Hours Medication List:   Current Facility-Administered Medications   Medication Dose Route Frequency Provider Last Rate   • azithromycin  500 mg Oral Q24H Eden Collet, MD     • budesonide  0.5 mg Nebulization Q12H Burnett Kussmaul, MD     • docusate sodium  100 mg Oral BID Colin Daley MD     • formoterol  20 mcg Nebulization Q12H Burnett Kussmaul, MD     • heparin (porcine)  5,000 Units Subcutaneous Q8H Jerrod Romero MD     • insulin lispro  1-6 Units Subcutaneous 4x Daily (AC & HS) Colin Daley MD     • ipratropium  0.5 mg Nebulization TID Jl Gonsalez DO     • levalbuterol  1.25 mg Nebulization TID Jl Gonsalez DO     • methylPREDNISolone sodium succinate  40 mg Intravenous Q12H Marco Wayne MD     • ondansetron  4 mg Intravenous Q6H PRN Burnett Kussmaul, MD     • potassium chloride  20 mEq Oral Daily John Gaines PA-C     • senna  1 tablet Oral HS Colin Daley MD     • torsemide  40 mg Oral Daily Cindy Galarza          Today, Patient Was Seen By: Aletha Kerr MD    **Please Note: This note may have been constructed using a voice recognition system. **

## 2023-07-26 NOTE — QUICK NOTE
7/26/2023 9:12 AM -  Morena Marks Jr.'s chart and case were reviewed by Sophia Traylor MD.  Mode of review included electronic chart check. See ACP documentation 7/25/23. Patient and family understandably find repeat goals conversations taxing. Given extended meeting yesterday, would not revisit goals unless patient significantly decompensates. Palliative Medicine will follow and remain available should he deteriorate or not improve. For urgent issues or any questions/concerns, please notify on-call provider via [facility] [place]. You may also call our answering service 24/7 at [phone]. Sophia Traylor MD  Palliative and Supportive Care  Clinic/Answering Service: [phone]  You can find me on [employer]!

## 2023-07-27 LAB
DME PARACHUTE DELIVERY DATE REQUESTED: NORMAL
DME PARACHUTE ITEM DESCRIPTION: NORMAL
DME PARACHUTE ORDER STATUS: NORMAL
DME PARACHUTE SUPPLIER NAME: NORMAL
DME PARACHUTE SUPPLIER PHONE: NORMAL
GLUCOSE SERPL-MCNC: 152 MG/DL (ref 65–140)
GLUCOSE SERPL-MCNC: 188 MG/DL (ref 65–140)
GLUCOSE SERPL-MCNC: 219 MG/DL (ref 65–140)
GLUCOSE SERPL-MCNC: 270 MG/DL (ref 65–140)
GLUCOSE SERPL-MCNC: 276 MG/DL (ref 65–140)

## 2023-07-27 PROCEDURE — 94640 AIRWAY INHALATION TREATMENT: CPT

## 2023-07-27 PROCEDURE — 94760 N-INVAS EAR/PLS OXIMETRY 1: CPT

## 2023-07-27 PROCEDURE — 99232 SBSQ HOSP IP/OBS MODERATE 35: CPT | Performed by: INTERNAL MEDICINE

## 2023-07-27 PROCEDURE — 94660 CPAP INITIATION&MGMT: CPT

## 2023-07-27 PROCEDURE — 82948 REAGENT STRIP/BLOOD GLUCOSE: CPT

## 2023-07-27 PROCEDURE — 94664 DEMO&/EVAL PT USE INHALER: CPT

## 2023-07-27 PROCEDURE — 99232 SBSQ HOSP IP/OBS MODERATE 35: CPT | Performed by: FAMILY MEDICINE

## 2023-07-27 RX ORDER — PREDNISONE 10 MG/1
10 TABLET ORAL DAILY
Status: DISCONTINUED | OUTPATIENT
Start: 2023-08-06 | End: 2023-07-29 | Stop reason: HOSPADM

## 2023-07-27 RX ORDER — PREDNISONE 20 MG/1
40 TABLET ORAL DAILY
Status: DISCONTINUED | OUTPATIENT
Start: 2023-07-28 | End: 2023-07-29 | Stop reason: HOSPADM

## 2023-07-27 RX ORDER — PREDNISONE 20 MG/1
20 TABLET ORAL DAILY
Status: DISCONTINUED | OUTPATIENT
Start: 2023-08-03 | End: 2023-07-29 | Stop reason: HOSPADM

## 2023-07-27 RX ADMIN — FORMOTEROL FUMARATE DIHYDRATE 20 MCG: 20 SOLUTION RESPIRATORY (INHALATION) at 19:45

## 2023-07-27 RX ADMIN — BUDESONIDE 0.5 MG: 0.5 INHALANT ORAL at 07:41

## 2023-07-27 RX ADMIN — IPRATROPIUM BROMIDE 0.5 MG: 0.5 SOLUTION RESPIRATORY (INHALATION) at 19:45

## 2023-07-27 RX ADMIN — METHYLPREDNISOLONE SODIUM SUCCINATE 40 MG: 40 INJECTION, POWDER, FOR SOLUTION INTRAMUSCULAR; INTRAVENOUS at 08:29

## 2023-07-27 RX ADMIN — LEVALBUTEROL HYDROCHLORIDE 1.25 MG: 1.25 SOLUTION RESPIRATORY (INHALATION) at 15:08

## 2023-07-27 RX ADMIN — INSULIN LISPRO 4 UNITS: 100 INJECTION, SOLUTION INTRAVENOUS; SUBCUTANEOUS at 11:35

## 2023-07-27 RX ADMIN — FORMOTEROL FUMARATE DIHYDRATE 20 MCG: 20 SOLUTION RESPIRATORY (INHALATION) at 07:41

## 2023-07-27 RX ADMIN — INSULIN LISPRO 1 UNITS: 100 INJECTION, SOLUTION INTRAVENOUS; SUBCUTANEOUS at 21:57

## 2023-07-27 RX ADMIN — IPRATROPIUM BROMIDE 0.5 MG: 0.5 SOLUTION RESPIRATORY (INHALATION) at 15:07

## 2023-07-27 RX ADMIN — BUDESONIDE 0.5 MG: 0.5 INHALANT ORAL at 19:45

## 2023-07-27 RX ADMIN — LEVALBUTEROL HYDROCHLORIDE 1.25 MG: 1.25 SOLUTION RESPIRATORY (INHALATION) at 19:45

## 2023-07-27 RX ADMIN — AZITHROMYCIN DIHYDRATE 500 MG: 250 TABLET, FILM COATED ORAL at 16:34

## 2023-07-27 RX ADMIN — IPRATROPIUM BROMIDE 0.5 MG: 0.5 SOLUTION RESPIRATORY (INHALATION) at 07:41

## 2023-07-27 RX ADMIN — INSULIN LISPRO 4 UNITS: 100 INJECTION, SOLUTION INTRAVENOUS; SUBCUTANEOUS at 16:34

## 2023-07-27 RX ADMIN — SENNOSIDES 8.6 MG: 8.6 TABLET, FILM COATED ORAL at 20:20

## 2023-07-27 RX ADMIN — POTASSIUM CHLORIDE 20 MEQ: 1500 TABLET, EXTENDED RELEASE ORAL at 08:29

## 2023-07-27 RX ADMIN — LEVALBUTEROL HYDROCHLORIDE 1.25 MG: 1.25 SOLUTION RESPIRATORY (INHALATION) at 07:41

## 2023-07-27 RX ADMIN — INSULIN LISPRO 1 UNITS: 100 INJECTION, SOLUTION INTRAVENOUS; SUBCUTANEOUS at 08:30

## 2023-07-27 RX ADMIN — HEPARIN SODIUM 5000 UNITS: 5000 INJECTION INTRAVENOUS; SUBCUTANEOUS at 05:23

## 2023-07-27 RX ADMIN — HEPARIN SODIUM 5000 UNITS: 5000 INJECTION INTRAVENOUS; SUBCUTANEOUS at 13:55

## 2023-07-27 RX ADMIN — DOCUSATE SODIUM 100 MG: 100 CAPSULE ORAL at 08:29

## 2023-07-27 RX ADMIN — HEPARIN SODIUM 5000 UNITS: 5000 INJECTION INTRAVENOUS; SUBCUTANEOUS at 20:20

## 2023-07-27 RX ADMIN — TORSEMIDE 40 MG: 20 TABLET ORAL at 08:29

## 2023-07-27 NOTE — ASSESSMENT & PLAN NOTE
· Admitted from 7/19 to 7/21 with acute exacerbation and discharged on steroid taper. On O2 at 3L and Prednisone 5 mg at baseline  · Developed acute shortness of breath on 7/22 while sitting in the kitchen  · Per EMS, patient was hypoxic at 60%, placed on BiPAP on route, and was given Solu-Medrol and DuoNeb on route. · Seen by pulm - Solumedrol decreased to 40 mg IV q 12 h,, on Xopenex/Atrovent/Pulmicort/Formoterol nebs  · Discussed with pulm - was taken off Bipap due to nausea and placed on HFNC for comfort, Bipap hs if not vomiting,   · Discussed with palliative care -had a family meeting Tuesday along with pulmonology.   Patient was made DNR/DNI  · Patient was able to be weaned down to mid flow-Case management arranging new concentrator so patient can be discharged home with higher oxygen requirement

## 2023-07-27 NOTE — PROGRESS NOTES
[address] [place] [address]  Progress Note  Name: Florence Milligan  MRN: [id number]  Unit/Bed#: PPHP 525-01 I Date of Admission: 7/22/2023   Date of Service: 7/27/2023 I Hospital Day: 5    Assessment/Plan   * End-stage COPD with acute exacerbation [facility])  Assessment & Plan  · Admitted from 7/19 to 7/21 with acute exacerbation and discharged on steroid taper. On O2 at 3L and Prednisone 5 mg at baseline  · Developed acute shortness of breath on 7/22 while sitting in the kitchen  · Per EMS, patient was hypoxic at 60%, placed on BiPAP on route, and was given Solu-Medrol and DuoNeb on route. · Seen by pulm - Solumedrol decreased to 40 mg IV q 12 h,, on Xopenex/Atrovent/Pulmicort/Formoterol nebs  · Discussed with pulm - was taken off Bipap due to nausea and placed on HFNC for comfort, Bipap hs if not vomiting,   · Discussed with palliative care -had a family meeting Tuesday along with pulmonology. Patient was made DNR/DNI  · Patient was able to be weaned down to mid flow-Case management arranging new concentrator so patient can be discharged home with higher oxygen requirement    Chronic hypercapnia/KEVIN  Assessment & Plan  · BiPAP hs and prn  · Currently on high flow oxygen-attempting weaning down on oxygen-patient weaned down to mid flow oxygen    Severe protein-calorie malnutrition ([address])  Assessment & Plan  Malnutrition Findings:   Adult Malnutrition type: Chronic illness  Adult Degree of Malnutrition: Other severe protein calorie malnutrition  Malnutrition Characteristics: Fat loss, Muscle loss, Inadequate energy, Weight loss      360 Statement: Severe protein/calorie malnutrition r/t catabolic illness as evidenced by 13% unplanned weight loss since 1/23/23, consuming < 75% energy intake compared to estimated energy needs, fat/muscle depletion of orbital/temple areas, BMI 14.5.  Treated with Ensure Compact bid    BMI Findings:  Adult BMI Classifications: Underweight < 18.5       Body mass index is 16.19 kg/m². Hyperglycemia  Assessment & Plan  Elevated blood sugar noted  Does not have formal diagnosis of diabetes  Likely secondary to steroids. Hemoglobin A1c is 5.7    Hyponatremia  Assessment & Plan  · Chronic hyponatremia noted, sodium at baseline    Chronic HFrEF (heart failure with reduced ejection fraction) ([facility])  Assessment & Plan  Wt Readings from Last 3 Encounters:   07/27/23 46.9 kg (103 lb 6.3 oz)   07/21/23 44.5 kg (98 lb 3.2 oz)   06/26/23 43.8 kg (96 lb 8 oz)     · Was on IV Lasix - transitioned to Torsemide 40 mg daily by cardiology Monday  · Euvolemic at present  · Monitor I/O, daily weights  · Cardiology on board and diuresis per cardio      Acute on chronic respiratory failure with hypoxia ([address])  Assessment & Plan  · Due to above  · On home O2 at 3 L  · Weaned  down to mid flow oxygen    Prostate cancer [facility])  Assessment & Plan  · Outpatient follow-up    Goals of care, counseling/discussion  Assessment & Plan  · Had a goals of care discussion by palliative care and pulmonology on Tuesday  · Patient and family present. Patient is currently DNR/DNI  · Patient's family in the room reported that they do not want hospice at the time of discharge. Plan is for discharge home tomorrow    Prediabetes  Assessment & Plan  · HbA1c 5.7 on 7/20/23  · Monitor blood sugars on steroids               VTE Pharmacologic Prophylaxis: VTE Score: 6 Moderate Risk (Score 3-4) - Pharmacological DVT Prophylaxis Ordered: heparin. Patient Centered Rounds: I performed bedside rounds with nursing staff today. Discussions with Specialists or Other Care Team Provider:     Education and Discussions with Family / Patient: Updated  (wife) at bedside.     Total Time Spent on Date of Encounter in care of patient: 35 minutes This time was spent on one or more of the following: performing physical exam; counseling and coordination of care; obtaining or reviewing history; documenting in the medical record; reviewing/ordering tests, medications or procedures; communicating with other healthcare professionals and discussing with patient's family/caregivers. Current Length of Stay: 5 day(s)  Current Patient Status: Inpatient   Certification Statement: The patient will continue to require additional inpatient hospital stay due to COPD exacerbation  Discharge Plan: Anticipate discharge tomorrow to home. Code Status: Level 3 - DNAR and DNI    Subjective:   Patient seen and examined. No events overnight. No specific complaints offered  Able to wean down to mid flow. Plan is to discharge home with mid flow oxygen. Case management is arranging home oxygen    Objective:     Vitals:   Temp (24hrs), Av.2 °F (36.8 °C), Min:98 °F (36.7 °C), Max:98.3 °F (36.8 °C)    Temp:  [98 °F (36.7 °C)-98.3 °F (36.8 °C)] 98.2 °F (36.8 °C)  HR:  [70-94] 90  Resp:  [16-22] 22  BP: ()/(54-68) 90/58  SpO2:  [95 %-99 %] 99 %  Body mass index is 16.19 kg/m². Input and Output Summary (last 24 hours): Intake/Output Summary (Last 24 hours) at 2023 1726  Last data filed at 2023 1600  Gross per 24 hour   Intake 761 ml   Output 2125 ml   Net -1364 ml       Physical Exam:   Physical Exam  Constitutional:       Comments: Chronically ill and cachectic appearing male   HENT:      Head: Normocephalic. Nose: Nose normal.   Eyes:      General: No scleral icterus. Cardiovascular:      Rate and Rhythm: Normal rate and regular rhythm. Pulmonary:      Comments: Decreased breath sounds bilateral  Abdominal:      General: Abdomen is flat. There is no distension. Musculoskeletal:         General: Normal range of motion. Skin:     General: Skin is warm. Neurological:      Mental Status: He is alert. Mental status is at baseline.           Additional Data:     Labs:  Results from last 7 days   Lab Units 23  0502 23  1948 23  1539   WBC Thousand/uL 8.94   < > 11.54*   HEMOGLOBIN g/dL 10.9*   < > 12.0   HEMATOCRIT % 35.7*   < > 39.1   PLATELETS Thousands/uL 182   < > 216   NEUTROS PCT %  --   --  94*   LYMPHS PCT %  --   --  2*   MONOS PCT %  --   --  3*   EOS PCT %  --   --  0    < > = values in this interval not displayed. Results from last 7 days   Lab Units 07/26/23  0502 07/24/23  0437 07/23/23  0524   SODIUM mmol/L 131*   < > 131*   POTASSIUM mmol/L 3.4*   < > 4.9   CHLORIDE mmol/L 78*   < > 81*   CO2 mmol/L >45*   < > >45*   BUN mg/dL 28*   < > 22   CREATININE mg/dL 0.81   < > 0.67   CALCIUM mg/dL 9.0   < > 9.5   ALBUMIN g/dL  --   --  3.4*   TOTAL BILIRUBIN mg/dL  --   --  0.70   ALK PHOS U/L  --   --  56   ALT U/L  --   --  25   AST U/L  --   --  31   GLUCOSE RANDOM mg/dL 237*   < > 125    < > = values in this interval not displayed. Results from last 7 days   Lab Units 07/27/23  1548 07/27/23  1123 07/27/23  0620 07/27/23  0601 07/26/23  2045 07/26/23  1535 07/26/23  1042 07/26/23  0546 07/25/23  2044 07/25/23  1552 07/25/23  1045 07/25/23  0611   POC GLUCOSE mg/dl 276* 270* 188* 219* 180* 242* 277* 250* 192* 262* 371* 248*               Lines/Drains:  Invasive Devices     Peripheral Intravenous Line  Duration           Peripheral IV 07/26/23 Right;Ventral (anterior) Forearm 1 day                      Imaging: No pertinent imaging reviewed.     Recent Cultures (last 7 days):         Last 24 Hours Medication List:   Current Facility-Administered Medications   Medication Dose Route Frequency Provider Last Rate   • budesonide  0.5 mg Nebulization Q12H Lucrecia Kelly MD     • docusate sodium  100 mg Oral BID Nilson Min MD     • formoterol  20 mcg Nebulization Q12H Travis Jackson MD     • heparin (porcine)  5,000 Units Subcutaneous Q8H [place] MD     • insulin lispro  1-6 Units Subcutaneous 4x Daily (AC & HS) Nilson Min MD     • ipratropium  0.5 mg Nebulization TID Sangita Ramp, DO     • levalbuterol  1.25 mg Nebulization TID Sangita Ramp, DO     • ondansetron  4 mg Intravenous Q6H PRN Carina Rodriguez MD     • potassium chloride  20 mEq Oral Daily Rupertconstance Mcneil Jeovany Guin, [place]     • [START ON 7/28/2023] predniSONE  40 mg Oral Daily Victorina Sagastume MD      Followed by   • [START ON 7/31/2023] predniSONE  30 mg Oral Daily Victorina Sagastume MD      Followed by   • [START ON 8/3/2023] predniSONE  20 mg Oral Daily Victorina Sagastume MD      Followed by   • [START ON 8/6/2023] predniSONE  10 mg Oral Daily Victorina Sagastume MD     • senna  1 tablet Oral HS Rickey Ceron MD     • torsemide  40 mg Oral Daily [facility]          Today, Patient Was Seen By: Tiara Roberts MD    **Please Note: This note may have been constructed using a voice recognition system. **

## 2023-07-27 NOTE — ASSESSMENT & PLAN NOTE
· Had a goals of care discussion by palliative care and pulmonology on Tuesday  · Patient and family present. Patient is currently DNR/DNI  · Patient's family in the room reported that they do not want hospice at the time of discharge.   Plan is for discharge home tomorrow

## 2023-07-27 NOTE — RESTORATIVE TECHNICIAN NOTE
Restorative Technician Note      Patient Name: Ivan Cardenas. Restorative Tech Visit Date: 07/27/23  Note Type: Mobility  Patient Position Upon Consult: Supine  Activity Performed: Transferred  Assistive Device: Roller walker  Patient Position at End of Consult: Bedside chair;  All needs within reach

## 2023-07-27 NOTE — ASSESSMENT & PLAN NOTE
Elevated blood sugar noted  Does not have formal diagnosis of diabetes  Likely secondary to steroids.   Hemoglobin A1c is 5.7

## 2023-07-27 NOTE — ASSESSMENT & PLAN NOTE
Malnutrition Findings:   Adult Malnutrition type: Chronic illness  Adult Degree of Malnutrition: Other severe protein calorie malnutrition  Malnutrition Characteristics: Fat loss, Muscle loss, Inadequate energy, Weight loss      360 Statement: Severe protein/calorie malnutrition r/t catabolic illness as evidenced by 13% unplanned weight loss since 1/23/23, consuming < 75% energy intake compared to estimated energy needs, fat/muscle depletion of orbital/temple areas, BMI 14.5. Treated with Ensure Compact bid    BMI Findings:  Adult BMI Classifications: Underweight < 18.5       Body mass index is 16.19 kg/m².

## 2023-07-27 NOTE — RESPIRATORY THERAPY NOTE
Home Oxygen Qualifying Test     Patient name: Yanci Villalta        : 1955   Date of Test:  2023  Diagnosis: End stage COPD   Home Oxygen Test:    **Medicare Guidelines require item(s) 1-5 on all ambulatory patients or 1 and 2 on non-ambulatory patients. 1. Baseline SPO2 on Room Air at rest 87 %   a. If <= 88% on Room Air add O2 via NC to obtain SpO2 >=88%. If LPM needed, document LPM 6 needed to reach =>88%    2. SPO2 during exertion on Room Air 75  %  a. During exertion monitor SPO2. If SPO2 increases >=89%, do not add supplemental oxygen    3. SPO2 on Oxygen at Rest 92 % at 6 LPM    4. SPO2 during exertion on Oxygen 91 % at 8 LPM    5. Test performed during exertion activity. [x]  Supplemental Home Oxygen is indicated. []  Client does not qualify for home oxygen. Respiratory Additional Notes- Pt is currently on 6lnc @ rest to maintain spo2 greater than 87%. With exertion, pt required 8lpm to keep spo2 above 87%. Pt does require home 02.     Alexandre Beckett, RT

## 2023-07-27 NOTE — ASSESSMENT & PLAN NOTE
· BiPAP hs and prn  · Currently on high flow oxygen-attempting weaning down on oxygen-patient weaned down to mid flow oxygen

## 2023-07-27 NOTE — ASSESSMENT & PLAN NOTE
Wt Readings from Last 3 Encounters:   07/27/23 46.9 kg (103 lb 6.3 oz)   07/21/23 44.5 kg (98 lb 3.2 oz)   06/26/23 43.8 kg (96 lb 8 oz)     · Was on IV Lasix - transitioned to Torsemide 40 mg daily by cardiology Monday  · Euvolemic at present  · Monitor I/O, daily weights  · Cardiology on board and diuresis per cardio

## 2023-07-27 NOTE — PROGRESS NOTES
Progress Note - Pulmonary   Segundo Ayo. 79 y.o. male MRN: [id number]  Unit/Bed#: [facility] 525-01 Encounter: [id number]      Assessment:  Acute on chronic hypoxemic/hypercapnic respiratory failure  COPD- very severe, end stage  Acute on chronic combined systolic/diastolic heart failure  Pulmonary cachexia  Underweight- BMI 14.5  Protein-calorie malnutrition  Now DNR/DNI with goal to go home    Plan:  Xopenex/Atrovent TID  Budesonide/Formoterol q12  Solumedrol 40 mg IV q12  SPO2 goal 88-92%. Weaned to midflow, use BiPAP when sleeping as long as no nausea/vomiting. Fan on face helps his breathing sensation. Given great saturation on mid-flow will prepare for discharge home with prednisone taper 60mg PO and decrease by 10mg every 3 days until at home dose of 5mg (these meds given on last discharge, patient says he has at home), using Trilogy at night and O2 by NC at home. Home O2 eval - make sure he has correct O2 concentrator. Will get ambulatory sats and get OOB    Starting 3 day course of azithromycin 500mg today he will get dose 3, qtc 485. Maintenance dose of azithromycin at home 3x/week    Diuresis per primary team. Weight has increased 4.1kg since yesterday based on bed weight reading, doubt patient has gained this weight and is likely due to objects on bed. Exam is euvolemic. Now switched to torsemide 40mg.     Subjective:   Feels ok, denies fever/chills/CP/abdominal pain. Has chronic SOB but feels comfortable on mid-flow NC and sleeps well with BiPAP at night. Objective:   Vitals: Blood pressure 95/54, pulse 91, temperature 98.1 °F (36.7 °C), temperature source Oral, resp. rate 20, height 5' 7" (1.702 m), weight 46.9 kg (103 lb 6.3 oz), SpO2 98 %. mid-flow 15L at 53%FiO2, Body mass index is 16.19 kg/m².       Intake/Output Summary (Last 24 hours) at 7/27/2023 0750  Last data filed at 7/27/2023 0716  Gross per 24 hour   Intake 240 ml   Output 1925 ml   Net -1685 ml         Physical Exam  GEN: cachectic, ill-appearing  Heart: distant heart sounds difficult to auscultate in setting of loud wheezing, Bilateral LE edema 2+ to midshins slightly improved from yesterday, warm extremities  Lungs: diffuse expiratory wheezing, respiratory distress  Abdomen: soft, non-tender  Neuro: normal though content and mood    Labs: I have personally reviewed pertinent lab results. Laboratory and Diagnostics  Results from last 7 days   Lab Units 07/26/23  0502 07/23/23  0524 07/22/23  1948 07/22/23  1539   WBC Thousand/uL 8.94 6.08  --  11.54*   HEMOGLOBIN g/dL 10.9* 11.5*  --  12.0   HEMATOCRIT % 35.7* 37.3  --  39.1   PLATELETS Thousands/uL 182 185 182 216   NEUTROS PCT %  --   --   --  94*   MONOS PCT %  --   --   --  3*   EOS PCT %  --   --   --  0     Results from last 7 days   Lab Units 07/26/23  0502 07/24/23  0437 07/23/23  0524 07/22/23  1539 07/21/23  0442   SODIUM mmol/L 131* 131* 131* 129* 131*   POTASSIUM mmol/L 3.4* 4.0 4.9 5.2 3.7   CHLORIDE mmol/L 78* 79* 81* 78* 80*   CO2 mmol/L >45* >45* >45* >45* >45*   BUN mg/dL 28* 28* 22 22 21   CREATININE mg/dL 0.81 0.78 0.67 0.77 0.70   CALCIUM mg/dL 9.0 9.0 9.5 9.5 9.3   GLUCOSE RANDOM mg/dL 237* 319* 125 292* 202*   ALT U/L  --   --  25 27  --    AST U/L  --   --  31 34  --    ALK PHOS U/L  --   --  56 65  --    ALBUMIN g/dL  --   --  3.4* 3.7  --    TOTAL BILIRUBIN mg/dL  --   --  0.70 0.72  --      Results from last 7 days   Lab Units 07/24/23  0437   MAGNESIUM mg/dL 2.3                               Results from last 7 days   Lab Units 07/22/23  1539   D-DIMER QUANTITATIVE ug/ml FEU <0.27           ABG:   Results from last 7 days   Lab Units 07/20/23  1437   PH ART  7.438   PCO2 ART mm Hg 78.4*   PO2 ART mm Hg 75.7   HCO3 ART mmol/L 51.8*   BASE EXC ART mmol/L 23.3   ABG SOURCE  Radial, Left       Microbiology:  COVID/Flu/RSV negative 7/19/23    Imaging and other studies: I have personally reviewed pertinent reports.     CXR 7/23:  Emphysema with mild pulmonary venous congestion.      Sandra Ruvalcaba MD  Internal Medicine Residency PGY-I  [address]: Portions of the record may have been created with voice recognition software. Occasional wrong word or "sound a like" substitutions may have occurred due to the inherent limitations of voice recognition software. Careful consideration should be taken to recognize, using context, where substitutions have occurred.

## 2023-07-28 ENCOUNTER — HOME HEALTH ADMISSION (OUTPATIENT)
Dept: HOME HEALTH SERVICES | Facility: HOME HEALTHCARE | Age: 68
End: 2023-07-28
Payer: COMMERCIAL

## 2023-07-28 LAB
GLUCOSE SERPL-MCNC: 172 MG/DL (ref 65–140)
GLUCOSE SERPL-MCNC: 236 MG/DL (ref 65–140)
GLUCOSE SERPL-MCNC: 314 MG/DL (ref 65–140)
GLUCOSE SERPL-MCNC: 314 MG/DL (ref 65–140)

## 2023-07-28 PROCEDURE — 94640 AIRWAY INHALATION TREATMENT: CPT

## 2023-07-28 PROCEDURE — 94660 CPAP INITIATION&MGMT: CPT

## 2023-07-28 PROCEDURE — 94760 N-INVAS EAR/PLS OXIMETRY 1: CPT

## 2023-07-28 PROCEDURE — 99232 SBSQ HOSP IP/OBS MODERATE 35: CPT | Performed by: FAMILY MEDICINE

## 2023-07-28 PROCEDURE — 94664 DEMO&/EVAL PT USE INHALER: CPT

## 2023-07-28 PROCEDURE — 99232 SBSQ HOSP IP/OBS MODERATE 35: CPT | Performed by: INTERNAL MEDICINE

## 2023-07-28 PROCEDURE — 82948 REAGENT STRIP/BLOOD GLUCOSE: CPT

## 2023-07-28 PROCEDURE — 94669 MECHANICAL CHEST WALL OSCILL: CPT

## 2023-07-28 RX ADMIN — BUDESONIDE 0.5 MG: 0.5 INHALANT ORAL at 19:10

## 2023-07-28 RX ADMIN — IPRATROPIUM BROMIDE 0.5 MG: 0.5 SOLUTION RESPIRATORY (INHALATION) at 13:17

## 2023-07-28 RX ADMIN — INSULIN LISPRO 5 UNITS: 100 INJECTION, SOLUTION INTRAVENOUS; SUBCUTANEOUS at 21:03

## 2023-07-28 RX ADMIN — PREDNISONE 40 MG: 20 TABLET ORAL at 08:30

## 2023-07-28 RX ADMIN — LEVALBUTEROL HYDROCHLORIDE 1.25 MG: 1.25 SOLUTION RESPIRATORY (INHALATION) at 19:10

## 2023-07-28 RX ADMIN — TORSEMIDE 40 MG: 20 TABLET ORAL at 08:30

## 2023-07-28 RX ADMIN — HEPARIN SODIUM 5000 UNITS: 5000 INJECTION INTRAVENOUS; SUBCUTANEOUS at 21:03

## 2023-07-28 RX ADMIN — LEVALBUTEROL HYDROCHLORIDE 1.25 MG: 1.25 SOLUTION RESPIRATORY (INHALATION) at 13:17

## 2023-07-28 RX ADMIN — SENNOSIDES 8.6 MG: 8.6 TABLET, FILM COATED ORAL at 21:03

## 2023-07-28 RX ADMIN — IPRATROPIUM BROMIDE 0.5 MG: 0.5 SOLUTION RESPIRATORY (INHALATION) at 19:10

## 2023-07-28 RX ADMIN — INSULIN LISPRO 3 UNITS: 100 INJECTION, SOLUTION INTRAVENOUS; SUBCUTANEOUS at 12:03

## 2023-07-28 RX ADMIN — POTASSIUM CHLORIDE 20 MEQ: 1500 TABLET, EXTENDED RELEASE ORAL at 08:30

## 2023-07-28 RX ADMIN — INSULIN LISPRO 1 UNITS: 100 INJECTION, SOLUTION INTRAVENOUS; SUBCUTANEOUS at 08:28

## 2023-07-28 RX ADMIN — LEVALBUTEROL HYDROCHLORIDE 1.25 MG: 1.25 SOLUTION RESPIRATORY (INHALATION) at 07:28

## 2023-07-28 RX ADMIN — FORMOTEROL FUMARATE DIHYDRATE 20 MCG: 20 SOLUTION RESPIRATORY (INHALATION) at 07:28

## 2023-07-28 RX ADMIN — HEPARIN SODIUM 5000 UNITS: 5000 INJECTION INTRAVENOUS; SUBCUTANEOUS at 05:07

## 2023-07-28 RX ADMIN — FORMOTEROL FUMARATE DIHYDRATE 20 MCG: 20 SOLUTION RESPIRATORY (INHALATION) at 19:10

## 2023-07-28 RX ADMIN — DOCUSATE SODIUM 100 MG: 100 CAPSULE ORAL at 17:04

## 2023-07-28 RX ADMIN — BUDESONIDE 0.5 MG: 0.5 INHALANT ORAL at 07:28

## 2023-07-28 RX ADMIN — INSULIN LISPRO 5 UNITS: 100 INJECTION, SOLUTION INTRAVENOUS; SUBCUTANEOUS at 17:05

## 2023-07-28 RX ADMIN — HEPARIN SODIUM 5000 UNITS: 5000 INJECTION INTRAVENOUS; SUBCUTANEOUS at 13:58

## 2023-07-28 RX ADMIN — IPRATROPIUM BROMIDE 0.5 MG: 0.5 SOLUTION RESPIRATORY (INHALATION) at 07:28

## 2023-07-28 RX ADMIN — DOCUSATE SODIUM 100 MG: 100 CAPSULE ORAL at 08:29

## 2023-07-28 NOTE — PROGRESS NOTES
Progress Note - Pulmonary   Ramila Alston. 79 y.o. male MRN: [id number]  Unit/Bed#: [facility] 525-01 Encounter: [id number]      Assessment:  No acute overnight patient has been weaned to mid flow nasal cannula with stable saturations. Acute on chronic hypoxemic/hypercapnic respiratory failure  COPD- very severe, end stage  Acute on chronic combined systolic/diastolic heart failure  Pulmonary cachexia  Underweight- BMI 14.5  Protein-calorie malnutrition  Now DNR/DNI with goal to go home    Plan:  Would not discharge until home oxygen and O2 concentrator are delivered. Continue to encourage getting up out of bed to simulate home environment. Please discontinue I's and O's, remove condom cath, do not use bedside urinal and instead stand up to use bathroom. We will keep at 6-8Lnc with fan on face, may adjust to symptoms and goal saturations >87%. Instructions for home:  · According to home oxygen test done by RT: Use 6 L nasal cannula at rest and 8 lpm with exertion to maintain SPO2 greater than 97%. Case management arranging for oxygen concentrator for patient to have at home if oxygen requirements increase. · Prednisone taper: Today starting prednisone steroid taper, receiving 40 mg for 3 days then dropping by 10 mg every 3 days until at home baseline 5 mg/day  · Maintenance dose of azithromycin  · Continue Xopenex/Atrovent/Perforomist/Pulmicort nebulizers  · Use trilogy BiPAP when sleeping and as needed    Subjective:   No acute events overnight, denies fever/chills/chest pain/abdominal pain. He reports feeling worse this morning though slept comfortably on BiPAP last night. Says that he was ok getting OOB, sitting in chair and going to bathroom yesterday, though son says this only happened once. He feels like he isn't getting enough O2 when at 3L, turned up to 6L and he says he is comfortable but feels a bit worse compared to yesterday and is not comfortable going home like this.       Objective:   Vitals: Blood pressure 99/68, pulse 70, temperature 98.2 °F (36.8 °C), temperature source Axillary, resp. rate (!) 27, height 5' 7" (1.702 m), weight 45.5 kg (100 lb 6.4 oz), SpO2 100 %. , In room 97% on 6L, Body mass index is 15.72 kg/m². Intake/Output Summary (Last 24 hours) at 7/28/2023 3836  Last data filed at 7/28/2023 0801  Gross per 24 hour   Intake 1061 ml   Output 875 ml   Net 186 ml         Physical Exam  GEN: cachectic, ill-appearing  Heart: distant heart sounds difficult to auscultate in setting of loud wheezing, no LE edema this morning, warm extremities  Lungs: diffuse expiratory wheezing, respiratory distress  Abdomen: soft, non-tender  Neuro: normal though content and mood    Labs: I have personally reviewed pertinent lab results.   Laboratory and Diagnostics  Results from last 7 days   Lab Units 07/26/23  0502 07/23/23  0524 07/22/23  1948 07/22/23  1539   WBC Thousand/uL 8.94 6.08  --  11.54*   HEMOGLOBIN g/dL 10.9* 11.5*  --  12.0   HEMATOCRIT % 35.7* 37.3  --  39.1   PLATELETS Thousands/uL 182 185 182 216   NEUTROS PCT %  --   --   --  94*   MONOS PCT %  --   --   --  3*   EOS PCT %  --   --   --  0     Results from last 7 days   Lab Units 07/26/23  0502 07/24/23  0437 07/23/23  0524 07/22/23  1539   SODIUM mmol/L 131* 131* 131* 129*   POTASSIUM mmol/L 3.4* 4.0 4.9 5.2   CHLORIDE mmol/L 78* 79* 81* 78*   CO2 mmol/L >45* >45* >45* >45*   BUN mg/dL 28* 28* 22 22   CREATININE mg/dL 0.81 0.78 0.67 0.77   CALCIUM mg/dL 9.0 9.0 9.5 9.5   GLUCOSE RANDOM mg/dL 237* 319* 125 292*   ALT U/L  --   --  25 27   AST U/L  --   --  31 34   ALK PHOS U/L  --   --  56 65   ALBUMIN g/dL  --   --  3.4* 3.7   TOTAL BILIRUBIN mg/dL  --   --  0.70 0.72     Results from last 7 days   Lab Units 07/24/23  0437   MAGNESIUM mg/dL 2.3                               Results from last 7 days   Lab Units 07/22/23  1539   D-DIMER QUANTITATIVE ug/ml FEU <0.27     Microbiology:  COVID/Flu/RSV negative 7/19/23    Imaging and other studies: I have personally reviewed pertinent reports. CXR 7/23:  Emphysema with mild pulmonary venous congestion. Forrest Liao MD  Internal Medicine Residency PGY-I  [address] [facility]: Portions of the record may have been created with voice recognition software. Occasional wrong word or "sound a like" substitutions may have occurred due to the inherent limitations of voice recognition software. Careful consideration should be taken to recognize, using context, where substitutions have occurred.

## 2023-07-28 NOTE — ASSESSMENT & PLAN NOTE
Malnutrition Findings:   Adult Malnutrition type: Chronic illness  Adult Degree of Malnutrition: Other severe protein calorie malnutrition  Malnutrition Characteristics: Fat loss, Muscle loss, Inadequate energy, Weight loss         360 Statement: Severe protein/calorie malnutrition r/t catabolic illness as evidenced by 13% unplanned weight loss since 1/23/23, consuming < 75% energy intake compared to estimated energy needs, fat/muscle depletion of orbital/temple areas, BMI 14.5. Treated with Ensure Compact bid    BMI Findings:  Adult BMI Classifications: Underweight < 18.5        Body mass index is 15.72 kg/m².

## 2023-07-28 NOTE — PROGRESS NOTES
[address] [place] [address]  Progress Note  Name: Margarette Vigil  MRN: [id number]  Unit/Bed#: [facility] 525-01 I Date of Admission: 7/22/2023   Date of Service: 7/28/2023 I Hospital Day: 6    Assessment/Plan   * End-stage COPD with acute exacerbation [facility])  Assessment & Plan  · Admitted from 7/19 to 7/21 with acute exacerbation and discharged on steroid taper. On O2 at 3L and Prednisone 5 mg at baseline  · Developed acute shortness of breath on 7/22 while sitting in the kitchen  · Per EMS, patient was hypoxic at 60%, placed on BiPAP on route, and was given Solu-Medrol and DuoNeb on route. · Seen by pulm - Solumedrol decreased to 40 mg IV q 12 h,, on Xopenex/Atrovent/Pulmicort/Formoterol nebs  · Discussed with pulm - was taken off Bipap due to nausea and placed on HFNC for comfort, Bipap hs if not vomiting,   · Discussed with palliative care -had a family meeting Tuesday along with pulmonology. Patient was made DNR/DNI  · Patient was able to be weaned down to mid flow-Case management arranging new concentrator so patient can be discharged home with higher oxygen requirement  · Currently on p.o. prednisone    Pulmonary cachexia due to COPD [facility])  Assessment & Plan  Malnutrition Findings:   Adult Malnutrition type: Chronic illness  Adult Degree of Malnutrition: Other severe protein calorie malnutrition  Malnutrition Characteristics: Fat loss, Muscle loss, Inadequate energy, Weight loss         360 Statement: Severe protein/calorie malnutrition r/t catabolic illness as evidenced by 13% unplanned weight loss since 1/23/23, consuming < 75% energy intake compared to estimated energy needs, fat/muscle depletion of orbital/temple areas, BMI 14.5. Treated with Ensure Compact bid    BMI Findings:  Adult BMI Classifications: Underweight < 18.5        Body mass index is 15.72 kg/m².        Chronic hypercapnia/KEVIN  Assessment & Plan  · BiPAP hs and prn  · Currently on high flow oxygen-attempting weaning down on oxygen-patient weaned down to mid flow oxygen    Severe protein-calorie malnutrition (HCC)  Assessment & Plan  Malnutrition Findings:   Adult Malnutrition type: Chronic illness  Adult Degree of Malnutrition: Other severe protein calorie malnutrition  Malnutrition Characteristics: Fat loss, Muscle loss, Inadequate energy, Weight loss      360 Statement: Severe protein/calorie malnutrition r/t catabolic illness as evidenced by 13% unplanned weight loss since 1/23/23, consuming < 75% energy intake compared to estimated energy needs, fat/muscle depletion of orbital/temple areas, BMI 14.5. Treated with Ensure Compact bid    BMI Findings:  Adult BMI Classifications: Underweight < 18.5       Body mass index is 15.72 kg/m². Hyperglycemia  Assessment & Plan  Elevated blood sugar noted  Does not have formal diagnosis of diabetes  Likely secondary to steroids. Hemoglobin A1c is 5.7    Hyponatremia  Assessment & Plan  · Chronic hyponatremia noted, sodium at baseline    Chronic HFrEF (heart failure with reduced ejection fraction) ([facility])  Assessment & Plan  Wt Readings from Last 3 Encounters:   07/28/23 45.5 kg (100 lb 6.4 oz)   07/21/23 44.5 kg (98 lb 3.2 oz)   06/26/23 43.8 kg (96 lb 8 oz)     · Was on IV Lasix - transitioned to Torsemide 40 mg daily by cardiology Monday  · Euvolemic at present  · Monitor I/O, daily weights  · Cardiology on board and diuresis per cardio      Acute on chronic respiratory failure with hypoxia ([address])  Assessment & Plan  · Due to above  · On home O2 at 3 L  · Weaned  down to mid flow oxygen  · Case management is arranging oxygen concentrator and Oxymizer for discharge home. Once the home acute pens are in place patient will be able to be discharged     Prostate cancer [facility])  Assessment & Plan  · Outpatient follow-up    Goals of care, counseling/discussion  Assessment & Plan  · Had a goals of care discussion by palliative care and pulmonology on Tuesday  · Patient and family present. Patient is currently DNR/DNI  · Patient's family in the room reported that they do not want hospice at the time of discharge. Plan is for discharge home tomorrow    Prediabetes  Assessment & Plan  · HbA1c 5.7 on 23  · Monitor blood sugars on steroids           VTE Pharmacologic Prophylaxis: VTE Score: 6 Moderate Risk (Score 3-4) - Pharmacological DVT Prophylaxis Ordered: heparin. Patient Centered Rounds: I performed bedside rounds with nursing staff today. Discussions with Specialists or Other Care Team Provider:     Education and Discussions with Family / Patient: Updated  (wife) at bedside. Total Time Spent on Date of Encounter in care of patient: 35 minutes This time was spent on one or more of the following: performing physical exam; counseling and coordination of care; obtaining or reviewing history; documenting in the medical record; reviewing/ordering tests, medications or procedures; communicating with other healthcare professionals and discussing with patient's family/caregivers. Current Length of Stay: 6 day(s)  Current Patient Status: Inpatient   Certification Statement: COPD exacerbation  Discharge Plan: Anticipate discharge tomorrow to home. Code Status: Level 3 - DNAR and DNI    Subjective:   Seen and examined. Sitting up in bed. Patient reported that his breathing is worse when compared to yesterday. Not Comfortable going home today    Objective:     Vitals:   Temp (24hrs), Av.1 °F (36.7 °C), Min:97.8 °F (36.6 °C), Max:98.2 °F (36.8 °C)    Temp:  [97.8 °F (36.6 °C)-98.2 °F (36.8 °C)] 97.8 °F (36.6 °C)  HR:  [] 103  Resp:  [18-27] 18  BP: ()/(62-68) 121/64  SpO2:  [98 %-100 %] 98 %  Body mass index is 15.72 kg/m². Input and Output Summary (last 24 hours):      Intake/Output Summary (Last 24 hours) at 2023 7770  Last data filed at 2023 1317  Gross per 24 hour   Intake 600 ml   Output 765 ml   Net -165 ml       Physical Exam:   Physical Exam  Constitutional:       General: He is not in acute distress. Comments: Delay ill-appearing and cachectic male   HENT:      Head: Normocephalic and atraumatic. Nose: Nose normal.   Eyes:      General: No scleral icterus. Cardiovascular:      Rate and Rhythm: Normal rate and regular rhythm. Pulmonary:      Effort: Pulmonary effort is normal.      Comments: Decreased breath sounds bilateral  Abdominal:      General: There is no distension. Palpations: There is no mass. Musculoskeletal:         General: Normal range of motion. Skin:     General: Skin is warm. Neurological:      Mental Status: He is alert. Additional Data:     Labs:  Results from last 7 days   Lab Units 07/26/23  0502 07/22/23  1948 07/22/23  1539   WBC Thousand/uL 8.94   < > 11.54*   HEMOGLOBIN g/dL 10.9*   < > 12.0   HEMATOCRIT % 35.7*   < > 39.1   PLATELETS Thousands/uL 182   < > 216   NEUTROS PCT %  --   --  94*   LYMPHS PCT %  --   --  2*   MONOS PCT %  --   --  3*   EOS PCT %  --   --  0    < > = values in this interval not displayed. Results from last 7 days   Lab Units 07/26/23  0502 07/24/23  0437 07/23/23  0524   SODIUM mmol/L 131*   < > 131*   POTASSIUM mmol/L 3.4*   < > 4.9   CHLORIDE mmol/L 78*   < > 81*   CO2 mmol/L >45*   < > >45*   BUN mg/dL 28*   < > 22   CREATININE mg/dL 0.81   < > 0.67   CALCIUM mg/dL 9.0   < > 9.5   ALBUMIN g/dL  --   --  3.4*   TOTAL BILIRUBIN mg/dL  --   --  0.70   ALK PHOS U/L  --   --  56   ALT U/L  --   --  25   AST U/L  --   --  31   GLUCOSE RANDOM mg/dL 237*   < > 125    < > = values in this interval not displayed.          Results from last 7 days   Lab Units 07/28/23  1537 07/28/23  1202 07/28/23  0643 07/27/23  2055 07/27/23  1548 07/27/23  1123 07/27/23  0620 07/27/23  0601 07/26/23  2045 07/26/23  1535 07/26/23  1042 07/26/23  0546   POC GLUCOSE mg/dl 314* 236* 172* 152* 276* 270* 188* 219* 180* 242* 277* 250*               Lines/Drains:  Invasive Devices Peripheral Intravenous Line  Duration           Peripheral IV 07/26/23 Right;Ventral (anterior) Forearm 2 days          Drain  Duration           External Urinary Catheter Medium <1 day                      Imaging: No pertinent imaging reviewed. Recent Cultures (last 7 days):         Last 24 Hours Medication List:   Current Facility-Administered Medications   Medication Dose Route Frequency Provider Last Rate   • budesonide  0.5 mg Nebulization Q12H Lucrecia Kelly MD     • docusate sodium  100 mg Oral BID Antwon Mcguire MD     • formoterol  20 mcg Nebulization Q12H Cherie Mauro MD     • heparin (porcine)  5,000 Units Subcutaneous Q8H [address] Cherie Mauro MD     • insulin lispro  1-6 Units Subcutaneous 4x Daily (AC & HS) Antwon Mcguire MD     • ipratropium  0.5 mg Nebulization TID Eulogio Mayers DO     • levalbuterol  1.25 mg Nebulization TID Eulogio Mayers DO     • ondansetron  4 mg Intravenous Q6H PRN Cherie Mauro MD     • potassium chloride  20 mEq Oral Daily Alli Duckworth PA-C     • predniSONE  40 mg Oral Daily Ina Rodriguez MD      Followed by   • [START ON 7/31/2023] predniSONE  30 mg Oral Daily Ina Rodriguez MD      Followed by   • [START ON 8/3/2023] predniSONE  20 mg Oral Daily Ina Rodriguez MD      Followed by   • [START ON 8/6/2023] predniSONE  10 mg Oral Daily Ina Rodriguez MD     • senna  1 tablet Oral HS Antwon Mcguire MD     • torsemide  40 mg Oral Daily Cindy Rene          Today, Patient Was Seen By: Brennen Burgess MD    **Please Note: This note may have been constructed using a voice recognition system. **

## 2023-07-28 NOTE — ASSESSMENT & PLAN NOTE
· Admitted from 7/19 to 7/21 with acute exacerbation and discharged on steroid taper. On O2 at 3L and Prednisone 5 mg at baseline  · Developed acute shortness of breath on 7/22 while sitting in the kitchen  · Per EMS, patient was hypoxic at 60%, placed on BiPAP on route, and was given Solu-Medrol and DuoNeb on route. · Seen by pulm - Solumedrol decreased to 40 mg IV q 12 h,, on Xopenex/Atrovent/Pulmicort/Formoterol nebs  · Discussed with pulm - was taken off Bipap due to nausea and placed on HFNC for comfort, Bipap hs if not vomiting,   · Discussed with palliative care -had a family meeting Tuesday along with pulmonology.   Patient was made DNR/DNI  · Patient was able to be weaned down to mid flow-Case management arranging new concentrator so patient can be discharged home with higher oxygen requirement  · Currently on p.o. prednisone

## 2023-07-28 NOTE — QUICK NOTE
7/28/2023 1:56 PM -  Anai Andrade Jr.'s chart and case were reviewed by Kaylyn Morales PA-C. Mode of review included electronic chart check. Per review, no events overnight. Primary and pulmonary teams continue to optimize pulmonary status. Most recent goals of care discussion took place on 7/25 (see ACP note). Unless patient has a substantial clinical change, patient and family have requested to defer repetitive goals of care discussions. Please page palliative should a clinical deterioration prompt need to revisit goals of care. For urgent issues or any questions/concerns, please notify on-call provider via [address]. You may also call our answering service 24/7 at [phone]. Kaylyn Morales PA-C  Palliative and Supportive Care  Clinic/Answering Service: [phone]  You can find me on Tracie!

## 2023-07-28 NOTE — ASSESSMENT & PLAN NOTE
Wt Readings from Last 3 Encounters:   07/28/23 45.5 kg (100 lb 6.4 oz)   07/21/23 44.5 kg (98 lb 3.2 oz)   06/26/23 43.8 kg (96 lb 8 oz)     · Was on IV Lasix - transitioned to Torsemide 40 mg daily by cardiology Monday  · Euvolemic at present  · Monitor I/O, daily weights  · Cardiology on board and diuresis per cardio

## 2023-07-28 NOTE — PROGRESS NOTES
-- Patient: Elsi Carr  -- MRN: [id number]  -- Aidin Request ID: [id number]  -- Level of care reserved: [address]  -- Partner Reserved: [address] [facility], [address] [phone]  -- Clinical needs requested:  -- Geography searched: [id number]  -- Start of Service:  -- Request sent: 10:54am EDT on 7/28/2023 by Valenitna Melgar  -- Partner reserved: 11:34am EDT on 7/28/2023 by Valentina Melgar  -- Choice list shared: 11:34am EDT on 7/28/2023 by Valentina Melgar

## 2023-07-28 NOTE — ASSESSMENT & PLAN NOTE
· Due to above  · On home O2 at 3 L  · Weaned  down to mid flow oxygen  · Case management is arranging oxygen concentrator and Oxymizer for discharge home.   Once the home acute pens are in place patient will be able to be discharged

## 2023-07-29 VITALS
OXYGEN SATURATION: 100 % | RESPIRATION RATE: 22 BRPM | SYSTOLIC BLOOD PRESSURE: 101 MMHG | HEIGHT: 67 IN | TEMPERATURE: 98.7 F | DIASTOLIC BLOOD PRESSURE: 58 MMHG | WEIGHT: 96.6 LBS | BODY MASS INDEX: 15.16 KG/M2 | HEART RATE: 82 BPM

## 2023-07-29 LAB
BUN SERPL-MCNC: 32 MG/DL (ref 5–25)
CALCIUM SERPL-MCNC: 9.1 MG/DL (ref 8.3–10.1)
CHLORIDE SERPL-SCNC: 86 MMOL/L (ref 96–108)
CO2 SERPL-SCNC: >45 MMOL/L (ref 21–32)
CREAT SERPL-MCNC: 0.68 MG/DL (ref 0.6–1.3)
ERYTHROCYTE [DISTWIDTH] IN BLOOD BY AUTOMATED COUNT: 12.8 % (ref 11.6–15.1)
GFR SERPL CREATININE-BSD FRML MDRD: 99 ML/MIN/1.73SQ M
GLUCOSE SERPL-MCNC: 151 MG/DL (ref 65–140)
GLUCOSE SERPL-MCNC: 161 MG/DL (ref 65–140)
GLUCOSE SERPL-MCNC: 163 MG/DL (ref 65–140)
HCT VFR BLD AUTO: 32 % (ref 36.5–49.3)
HGB BLD-MCNC: 9.9 G/DL (ref 12–17)
MCH RBC QN AUTO: 31.5 PG (ref 26.8–34.3)
MCHC RBC AUTO-ENTMCNC: 30.9 G/DL (ref 31.4–37.4)
MCV RBC AUTO: 102 FL (ref 82–98)
PLATELET # BLD AUTO: 177 THOUSANDS/UL (ref 149–390)
PMV BLD AUTO: 9.8 FL (ref 8.9–12.7)
POTASSIUM SERPL-SCNC: 3.8 MMOL/L (ref 3.5–5.3)
RBC # BLD AUTO: 3.14 MILLION/UL (ref 3.88–5.62)
SODIUM SERPL-SCNC: 137 MMOL/L (ref 135–147)
WBC # BLD AUTO: 6.75 THOUSAND/UL (ref 4.31–10.16)

## 2023-07-29 PROCEDURE — 80048 BASIC METABOLIC PNL TOTAL CA: CPT | Performed by: FAMILY MEDICINE

## 2023-07-29 PROCEDURE — 82948 REAGENT STRIP/BLOOD GLUCOSE: CPT

## 2023-07-29 PROCEDURE — 85027 COMPLETE CBC AUTOMATED: CPT | Performed by: FAMILY MEDICINE

## 2023-07-29 PROCEDURE — 94664 DEMO&/EVAL PT USE INHALER: CPT

## 2023-07-29 PROCEDURE — 94760 N-INVAS EAR/PLS OXIMETRY 1: CPT

## 2023-07-29 PROCEDURE — 99239 HOSP IP/OBS DSCHRG MGMT >30: CPT | Performed by: FAMILY MEDICINE

## 2023-07-29 PROCEDURE — 94660 CPAP INITIATION&MGMT: CPT

## 2023-07-29 PROCEDURE — 94640 AIRWAY INHALATION TREATMENT: CPT

## 2023-07-29 RX ORDER — AZITHROMYCIN 500 MG/1
500 TABLET, FILM COATED ORAL EVERY 24 HOURS
Qty: 18 TABLET | Refills: 0 | Status: SHIPPED | OUTPATIENT
Start: 2023-07-29 | End: 2023-08-03

## 2023-07-29 RX ORDER — PREDNISONE 10 MG/1
TABLET ORAL
Qty: 2 TABLET | Refills: 0 | Status: SHIPPED | OUTPATIENT
Start: 2023-07-30 | End: 2023-07-31 | Stop reason: SDUPTHER

## 2023-07-29 RX ORDER — TORSEMIDE 10 MG/1
40 TABLET ORAL DAILY
Qty: 90 TABLET | Refills: 0 | Status: SHIPPED | OUTPATIENT
Start: 2023-07-29

## 2023-07-29 RX ORDER — PREDNISONE 5 MG/1
5 TABLET ORAL DAILY
Qty: 30 TABLET | Refills: 0 | Status: ON HOLD | OUTPATIENT
Start: 2023-08-08 | End: 2023-08-03 | Stop reason: SDUPTHER

## 2023-07-29 RX ADMIN — POTASSIUM CHLORIDE 20 MEQ: 1500 TABLET, EXTENDED RELEASE ORAL at 08:47

## 2023-07-29 RX ADMIN — PREDNISONE 40 MG: 20 TABLET ORAL at 08:46

## 2023-07-29 RX ADMIN — IPRATROPIUM BROMIDE 0.5 MG: 0.5 SOLUTION RESPIRATORY (INHALATION) at 14:55

## 2023-07-29 RX ADMIN — LEVALBUTEROL HYDROCHLORIDE 1.25 MG: 1.25 SOLUTION RESPIRATORY (INHALATION) at 14:55

## 2023-07-29 RX ADMIN — BUDESONIDE 0.5 MG: 0.5 INHALANT ORAL at 07:24

## 2023-07-29 RX ADMIN — LEVALBUTEROL HYDROCHLORIDE 1.25 MG: 1.25 SOLUTION RESPIRATORY (INHALATION) at 07:24

## 2023-07-29 RX ADMIN — HEPARIN SODIUM 5000 UNITS: 5000 INJECTION INTRAVENOUS; SUBCUTANEOUS at 05:23

## 2023-07-29 RX ADMIN — INSULIN LISPRO 1 UNITS: 100 INJECTION, SOLUTION INTRAVENOUS; SUBCUTANEOUS at 07:40

## 2023-07-29 RX ADMIN — TORSEMIDE 40 MG: 20 TABLET ORAL at 08:47

## 2023-07-29 RX ADMIN — INSULIN LISPRO 1 UNITS: 100 INJECTION, SOLUTION INTRAVENOUS; SUBCUTANEOUS at 11:44

## 2023-07-29 RX ADMIN — IPRATROPIUM BROMIDE 0.5 MG: 0.5 SOLUTION RESPIRATORY (INHALATION) at 07:24

## 2023-07-29 RX ADMIN — FORMOTEROL FUMARATE DIHYDRATE 20 MCG: 20 SOLUTION RESPIRATORY (INHALATION) at 07:24

## 2023-07-29 NOTE — PLAN OF CARE
Problem: Potential for Falls  Goal: Patient will remain free of falls  Description: INTERVENTIONS:  - Educate patient/family on patient safety including physical limitations  - Instruct patient to call for assistance with activity   - Consult OT/PT to assist with strengthening/mobility   - Keep Call bell within reach  - Keep bed low and locked with side rails adjusted as appropriate  - Keep care items and personal belongings within reach  - Initiate and maintain comfort rounds  - Make Fall Risk Sign visible to staff  - Apply yellow socks and bracelet for high fall risk patients  - Consider moving patient to room near nurses station  Outcome: Progressing     Problem: MOBILITY - ADULT  Goal: Maintain or return to baseline ADL function  Description: INTERVENTIONS:  -  Assess patient's ability to carry out ADLs; assess patient's baseline for ADL function and identify physical deficits which impact ability to perform ADLs (bathing, care of mouth/teeth, toileting, grooming, dressing, etc.)  - Assess/evaluate cause of self-care deficits   - Assess range of motion  - Assess patient's mobility; develop plan if impaired  - Assess patient's need for assistive devices and provide as appropriate  - Encourage maximum independence but intervene and supervise when necessary  - Involve family in performance of ADLs  - Assess for home care needs following discharge   - Consider OT consult to assist with ADL evaluation and planning for discharge  - Provide patient education as appropriate  Outcome: Progressing  Goal: Maintains/Returns to pre admission functional level  Description: INTERVENTIONS:  - Perform BMAT or MOVE assessment daily.   - Set and communicate daily mobility goal to care team and patient/family/caregiver. - Collaborate with rehabilitation services on mobility goals if consulted  - Perform Range of Motion 3 times a day. - Reposition patient every 3 hours.   - Dangle patient 3 times a day  - Stand patient 3 times a day  - Ambulate patient 3 times a day  - Out of bed to chair 3 times a day   - Out of bed for meals 3 times a day  - Out of bed for toileting  - Record patient progress and toleration of activity level   Outcome: Progressing     Problem: Prexisting or High Potential for Compromised Skin Integrity  Goal: Skin integrity is maintained or improved  Description: INTERVENTIONS:  - Identify patients at risk for skin breakdown  - Assess and monitor skin integrity  - Assess and monitor nutrition and hydration status  - Monitor labs   - Assess for incontinence   - Turn and reposition patient  - Assist with mobility/ambulation  - Relieve pressure over bony prominences  - Avoid friction and shearing  - Provide appropriate hygiene as needed including keeping skin clean and dry  - Evaluate need for skin moisturizer/barrier cream  - Collaborate with interdisciplinary team   - Patient/family teaching  - Consider wound care consult   Outcome: Progressing     Problem: Nutrition/Hydration-ADULT  Goal: Nutrient/Hydration intake appropriate for improving, restoring or maintaining nutritional needs  Description: Monitor and assess patient's nutrition/hydration status for malnutrition. Collaborate with interdisciplinary team and initiate plan and interventions as ordered. Monitor patient's weight and dietary intake as ordered or per policy. Utilize nutrition screening tool and intervene as necessary. Determine patient's food preferences and provide high-protein, high-caloric foods as appropriate.      INTERVENTIONS:  - Monitor oral intake, urinary output, labs, and treatment plans  - Assess nutrition and hydration status and recommend course of action  - Evaluate amount of meals eaten  - Assist patient with eating if necessary   - Allow adequate time for meals  - Recommend/ encourage appropriate diets, oral nutritional supplements, and vitamin/mineral supplements  - Order, calculate, and assess calorie counts as needed  - Recommend, monitor, and adjust tube feedings and TPN/PPN based on assessed needs  - Assess need for intravenous fluids  - Provide specific nutrition/hydration education as appropriate  - Include patient/family/caregiver in decisions related to nutrition  Outcome: Progressing     Problem: PAIN - ADULT  Goal: Verbalizes/displays adequate comfort level or baseline comfort level  Description: Interventions:  - Encourage patient to monitor pain and request assistance  - Assess pain using appropriate pain scale  - Administer analgesics based on type and severity of pain and evaluate response  - Implement non-pharmacological measures as appropriate and evaluate response  - Consider cultural and social influences on pain and pain management  - Notify physician/advanced practitioner if interventions unsuccessful or patient reports new pain  Outcome: Progressing     Problem: INFECTION - ADULT  Goal: Absence or prevention of progression during hospitalization  Description: INTERVENTIONS:  - Assess and monitor for signs and symptoms of infection  - Monitor lab/diagnostic results  - Monitor all insertion sites, i.e. indwelling lines, tubes, and drains  - Monitor endotracheal if appropriate and nasal secretions for changes in amount and color  - Buhl appropriate cooling/warming therapies per order  - Administer medications as ordered  - Instruct and encourage patient and family to use good hand hygiene technique  - Identify and instruct in appropriate isolation precautions for identified infection/condition  Outcome: Progressing     Problem: DISCHARGE PLANNING  Goal: Discharge to home or other facility with appropriate resources  Description: INTERVENTIONS:  - Identify barriers to discharge w/patient and caregiver  - Arrange for needed discharge resources and transportation as appropriate  - Identify discharge learning needs (meds, wound care, etc.)  - Arrange for interpretive services to assist at discharge as needed  - Refer to Case Management Department for coordinating discharge planning if the patient needs post-hospital services based on physician/advanced practitioner order or complex needs related to functional status, cognitive ability, or social support system  Outcome: Progressing     Problem: Knowledge Deficit  Goal: Patient/family/caregiver demonstrates understanding of disease process, treatment plan, medications, and discharge instructions  Description: Complete learning assessment and assess knowledge base.   Interventions:  - Provide teaching at level of understanding  - Provide teaching via preferred learning methods  Outcome: Progressing     Problem: RESPIRATORY - ADULT  Goal: Achieves optimal ventilation and oxygenation  Description: INTERVENTIONS:  - Assess for changes in respiratory status  - Assess for changes in mentation and behavior  - Position to facilitate oxygenation and minimize respiratory effort  - Oxygen administered by appropriate delivery if ordered  - Initiate smoking cessation education as indicated  - Encourage broncho-pulmonary hygiene including cough, deep breathe, Incentive Spirometry  - Assess the need for suctioning and aspirate as needed  - Assess and instruct to report SOB or any respiratory difficulty  - Respiratory Therapy support as indicated  Outcome: Progressing

## 2023-07-29 NOTE — DISCHARGE INSTR - AVS FIRST PAGE
Please check your weight daily. Follow the low salt and fluid restriction. If the weight increases more than 3 pounds from baseline or shortness of breath or lower extremity edema please contact your cardiology  Take your inhalers as prescribed.   Contact pulmonology office for outpatient appointment or if you have worsening shortness of breath

## 2023-07-30 ENCOUNTER — HOME CARE VISIT (OUTPATIENT)
Dept: HOME HEALTH SERVICES | Facility: HOME HEALTHCARE | Age: 68
End: 2023-07-30
Payer: COMMERCIAL

## 2023-07-30 VITALS
SYSTOLIC BLOOD PRESSURE: 120 MMHG | TEMPERATURE: 98.1 F | OXYGEN SATURATION: 98 % | HEART RATE: 92 BPM | RESPIRATION RATE: 20 BRPM | BODY MASS INDEX: 17.67 KG/M2 | DIASTOLIC BLOOD PRESSURE: 58 MMHG | HEIGHT: 62 IN

## 2023-07-30 PROCEDURE — 10330081 VN NO-PAY CLAIM PROCEDURE

## 2023-07-30 PROCEDURE — 400013 VN SOC

## 2023-07-30 PROCEDURE — G0299 HHS/HOSPICE OF RN EA 15 MIN: HCPCS

## 2023-07-30 NOTE — CASE COMMUNICATION
[facility] has admitted your patient to [facility] service with the following disciplines:      SN, PT, OT and MSW  This report is informational only, no responses is needed  Primary focus of home health care: CP assess r/t COPD and CHF  Patient stated goals of care: be well enough to go out of house with wife  Anticipated visit pattern and next visit date: 3w3, 2w3, 1w3. next visit: 8/1/23. See medication list - meds in home d iffer from AVS: NA home meds reflect AVS. Please be advised, EMR (EPIC) identified drug interactions for this patient based on the current medication profile. 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 Potential barriers to goal achievement o2 dependent, comorbidities, complexity of care, SHARIF, mobility deficit. Thank you for allowing us to participate in the care of your patient.       Blanchie Paget Schimme l weekend RN with A

## 2023-07-31 ENCOUNTER — TRANSITIONAL CARE MANAGEMENT (OUTPATIENT)
Dept: INTERNAL MEDICINE CLINIC | Facility: CLINIC | Age: 68
End: 2023-07-31

## 2023-07-31 ENCOUNTER — APPOINTMENT (EMERGENCY)
Dept: RADIOLOGY | Facility: HOSPITAL | Age: 68
DRG: 189 | End: 2023-07-31
Payer: MEDICARE

## 2023-07-31 ENCOUNTER — HOSPITAL ENCOUNTER (INPATIENT)
Facility: HOSPITAL | Age: 68
LOS: 3 days | Discharge: HOME | DRG: 189 | End: 2023-08-03
Attending: EMERGENCY MEDICINE | Admitting: INTERNAL MEDICINE
Payer: MEDICARE

## 2023-07-31 ENCOUNTER — HOME CARE VISIT (OUTPATIENT)
Dept: HOME HEALTH SERVICES | Facility: HOME HEALTHCARE | Age: 68
End: 2023-07-31
Payer: COMMERCIAL

## 2023-07-31 DIAGNOSIS — R73.9 STEROID-INDUCED HYPERGLYCEMIA: ICD-10-CM

## 2023-07-31 DIAGNOSIS — R77.8 ELEVATED TROPONIN: ICD-10-CM

## 2023-07-31 DIAGNOSIS — I50.9 CHF (CONGESTIVE HEART FAILURE) (HCC): ICD-10-CM

## 2023-07-31 DIAGNOSIS — E44.0 MODERATE PROTEIN-CALORIE MALNUTRITION (HCC): ICD-10-CM

## 2023-07-31 DIAGNOSIS — J44.1 COPD EXACERBATION (HCC): Primary | ICD-10-CM

## 2023-07-31 DIAGNOSIS — J96.01 ACUTE RESPIRATORY FAILURE WITH HYPOXEMIA (HCC): ICD-10-CM

## 2023-07-31 DIAGNOSIS — J96.00 ACUTE RESPIRATORY FAILURE, UNSPECIFIED WHETHER WITH HYPOXIA OR HYPERCAPNIA (HCC): ICD-10-CM

## 2023-07-31 DIAGNOSIS — T38.0X5A STEROID-INDUCED HYPERGLYCEMIA: ICD-10-CM

## 2023-07-31 DIAGNOSIS — D72.829 LEUKOCYTOSIS: ICD-10-CM

## 2023-07-31 DIAGNOSIS — E87.0 HYPERNATREMIA: ICD-10-CM

## 2023-07-31 DIAGNOSIS — R06.89 HYPERCAPNIA: ICD-10-CM

## 2023-07-31 PROBLEM — R79.89 ELEVATED TROPONIN: Status: ACTIVE | Noted: 2023-07-31

## 2023-07-31 PROBLEM — Z85.51 HISTORY OF BLADDER CANCER: Status: ACTIVE | Noted: 2023-07-31

## 2023-07-31 LAB
2HR DELTA HS TROPONIN: -7 NG/L
4HR DELTA HS TROPONIN: -9 NG/L
ANION GAP SERPL CALCULATED.3IONS-SCNC: 4 MMOL/L
ATRIAL RATE: 214 BPM
BASE EX.OXY STD BLDV CALC-SCNC: 74.6 % (ref 60–80)
BASE EXCESS BLDV CALC-SCNC: 17 MMOL/L
BASOPHILS # BLD AUTO: 0.02 THOUSANDS/ÂΜL (ref 0–0.1)
BASOPHILS NFR BLD AUTO: 0 % (ref 0–1)
BUN SERPL-MCNC: 32 MG/DL (ref 5–25)
CALCIUM SERPL-MCNC: 9.8 MG/DL (ref 8.3–10.1)
CARDIAC TROPONIN I PNL SERPL HS: 41 NG/L
CARDIAC TROPONIN I PNL SERPL HS: 43 NG/L
CARDIAC TROPONIN I PNL SERPL HS: 50 NG/L
CHLORIDE SERPL-SCNC: 81 MMOL/L (ref 96–108)
CO2 SERPL-SCNC: 44 MMOL/L (ref 21–32)
CREAT SERPL-MCNC: 0.66 MG/DL (ref 0.6–1.3)
D DIMER PPP FEU-MCNC: 1.03 UG/ML FEU
EOSINOPHIL # BLD AUTO: 0 THOUSAND/ÂΜL (ref 0–0.61)
EOSINOPHIL NFR BLD AUTO: 0 % (ref 0–6)
ERYTHROCYTE [DISTWIDTH] IN BLOOD BY AUTOMATED COUNT: 12.6 % (ref 11.6–15.1)
GFR SERPL CREATININE-BSD FRML MDRD: 100 ML/MIN/1.73SQ M
GLUCOSE SERPL-MCNC: 151 MG/DL (ref 65–140)
HCO3 BLDV-SCNC: 45.4 MMOL/L (ref 24–30)
HCT VFR BLD AUTO: 40.2 % (ref 36.5–49.3)
HGB BLD-MCNC: 12.8 G/DL (ref 12–17)
IMM GRANULOCYTES # BLD AUTO: 0.1 THOUSAND/UL (ref 0–0.2)
IMM GRANULOCYTES NFR BLD AUTO: 1 % (ref 0–2)
LYMPHOCYTES # BLD AUTO: 0.2 THOUSANDS/ÂΜL (ref 0.6–4.47)
LYMPHOCYTES NFR BLD AUTO: 1 % (ref 14–44)
MCH RBC QN AUTO: 31.8 PG (ref 26.8–34.3)
MCHC RBC AUTO-ENTMCNC: 31.8 G/DL (ref 31.4–37.4)
MCV RBC AUTO: 100 FL (ref 82–98)
MONOCYTES # BLD AUTO: 0.93 THOUSAND/ÂΜL (ref 0.17–1.22)
MONOCYTES NFR BLD AUTO: 5 % (ref 4–12)
NEUTROPHILS # BLD AUTO: 18.84 THOUSANDS/ÂΜL (ref 1.85–7.62)
NEUTS SEG NFR BLD AUTO: 93 % (ref 43–75)
NRBC BLD AUTO-RTO: 0 /100 WBCS
O2 CT BLDV-SCNC: 12.3 ML/DL
PCO2 BLDV: 76.7 MM HG (ref 42–50)
PH BLDV: 7.39 [PH] (ref 7.3–7.4)
PLATELET # BLD AUTO: 239 THOUSANDS/UL (ref 149–390)
PMV BLD AUTO: 9.8 FL (ref 8.9–12.7)
PO2 BLDV: 42.4 MM HG (ref 35–45)
POTASSIUM SERPL-SCNC: 4.2 MMOL/L (ref 3.5–5.3)
PROCALCITONIN SERPL-MCNC: 0.65 NG/ML
QRS AXIS: 112 DEGREES
QRSD INTERVAL: 124 MS
QT INTERVAL: 346 MS
QTC INTERVAL: 450 MS
RBC # BLD AUTO: 4.02 MILLION/UL (ref 3.88–5.62)
SODIUM SERPL-SCNC: 129 MMOL/L (ref 135–147)
T WAVE AXIS: 98 DEGREES
VENTRICULAR RATE: 102 BPM
WBC # BLD AUTO: 20.09 THOUSAND/UL (ref 4.31–10.16)

## 2023-07-31 PROCEDURE — 36415 COLL VENOUS BLD VENIPUNCTURE: CPT

## 2023-07-31 PROCEDURE — 93005 ELECTROCARDIOGRAM TRACING: CPT

## 2023-07-31 PROCEDURE — 71045 X-RAY EXAM CHEST 1 VIEW: CPT

## 2023-07-31 PROCEDURE — 94002 VENT MGMT INPAT INIT DAY: CPT

## 2023-07-31 PROCEDURE — 84145 PROCALCITONIN (PCT): CPT | Performed by: INTERNAL MEDICINE

## 2023-07-31 PROCEDURE — 80048 BASIC METABOLIC PNL TOTAL CA: CPT

## 2023-07-31 PROCEDURE — 94760 N-INVAS EAR/PLS OXIMETRY 1: CPT

## 2023-07-31 PROCEDURE — 93010 ELECTROCARDIOGRAM REPORT: CPT | Performed by: INTERNAL MEDICINE

## 2023-07-31 PROCEDURE — 99291 CRITICAL CARE FIRST HOUR: CPT | Performed by: EMERGENCY MEDICINE

## 2023-07-31 PROCEDURE — 99223 1ST HOSP IP/OBS HIGH 75: CPT | Performed by: INTERNAL MEDICINE

## 2023-07-31 PROCEDURE — G1004 CDSM NDSC: HCPCS

## 2023-07-31 PROCEDURE — 94640 AIRWAY INHALATION TREATMENT: CPT

## 2023-07-31 PROCEDURE — G0321 AUDIO-ONLY HHS: HCPCS

## 2023-07-31 PROCEDURE — 71275 CT ANGIOGRAPHY CHEST: CPT

## 2023-07-31 PROCEDURE — 99285 EMERGENCY DEPT VISIT HI MDM: CPT

## 2023-07-31 PROCEDURE — 85379 FIBRIN DEGRADATION QUANT: CPT

## 2023-07-31 PROCEDURE — 85025 COMPLETE CBC W/AUTO DIFF WBC: CPT

## 2023-07-31 PROCEDURE — 84484 ASSAY OF TROPONIN QUANT: CPT

## 2023-07-31 PROCEDURE — 94760 N-INVAS EAR/PLS OXIMETRY 1: CPT | Performed by: SOCIAL WORKER

## 2023-07-31 PROCEDURE — 82805 BLOOD GASES W/O2 SATURATION: CPT

## 2023-07-31 RX ORDER — FORMOTEROL FUMARATE 20 UG/2ML
20 SOLUTION RESPIRATORY (INHALATION)
Status: DISCONTINUED | OUTPATIENT
Start: 2023-07-31 | End: 2023-08-03 | Stop reason: HOSPADM

## 2023-07-31 RX ORDER — HEPARIN SODIUM 5000 [USP'U]/ML
5000 INJECTION, SOLUTION INTRAVENOUS; SUBCUTANEOUS EVERY 12 HOURS SCHEDULED
Status: DISCONTINUED | OUTPATIENT
Start: 2023-07-31 | End: 2023-08-03 | Stop reason: HOSPADM

## 2023-07-31 RX ORDER — IPRATROPIUM BROMIDE AND ALBUTEROL SULFATE 2.5; .5 MG/3ML; MG/3ML
3 SOLUTION RESPIRATORY (INHALATION) 4 TIMES DAILY
Status: DISCONTINUED | OUTPATIENT
Start: 2023-07-31 | End: 2023-07-31

## 2023-07-31 RX ORDER — BUDESONIDE 0.5 MG/2ML
0.5 INHALANT ORAL
Status: DISCONTINUED | OUTPATIENT
Start: 2023-07-31 | End: 2023-08-03 | Stop reason: HOSPADM

## 2023-07-31 RX ORDER — PREDNISONE 10 MG/1
TABLET ORAL
Qty: 22 TABLET | Refills: 0 | Status: SHIPPED | OUTPATIENT
Start: 2023-07-31 | End: 2023-08-03

## 2023-07-31 RX ORDER — LEVALBUTEROL INHALATION SOLUTION 1.25 MG/3ML
1.25 SOLUTION RESPIRATORY (INHALATION)
Status: DISCONTINUED | OUTPATIENT
Start: 2023-07-31 | End: 2023-08-03 | Stop reason: HOSPADM

## 2023-07-31 RX ORDER — DOCUSATE SODIUM 100 MG/1
100 CAPSULE, LIQUID FILLED ORAL 2 TIMES DAILY
Status: DISCONTINUED | OUTPATIENT
Start: 2023-07-31 | End: 2023-08-03 | Stop reason: HOSPADM

## 2023-07-31 RX ORDER — POTASSIUM CHLORIDE 20 MEQ/1
20 TABLET, EXTENDED RELEASE ORAL 2 TIMES DAILY
Status: DISCONTINUED | OUTPATIENT
Start: 2023-07-31 | End: 2023-08-03 | Stop reason: HOSPADM

## 2023-07-31 RX ORDER — FAMOTIDINE 20 MG/1
20 TABLET, FILM COATED ORAL 2 TIMES DAILY
Status: DISCONTINUED | OUTPATIENT
Start: 2023-07-31 | End: 2023-08-03 | Stop reason: HOSPADM

## 2023-07-31 RX ORDER — TORSEMIDE 20 MG/1
40 TABLET ORAL DAILY
Status: DISCONTINUED | OUTPATIENT
Start: 2023-08-01 | End: 2023-08-03 | Stop reason: HOSPADM

## 2023-07-31 RX ORDER — TAMSULOSIN HYDROCHLORIDE 0.4 MG/1
0.4 CAPSULE ORAL
Status: DISCONTINUED | OUTPATIENT
Start: 2023-07-31 | End: 2023-08-03 | Stop reason: HOSPADM

## 2023-07-31 RX ORDER — HEPARIN SODIUM 5000 [USP'U]/ML
5000 INJECTION, SOLUTION INTRAVENOUS; SUBCUTANEOUS EVERY 8 HOURS SCHEDULED
Status: DISCONTINUED | OUTPATIENT
Start: 2023-07-31 | End: 2023-07-31

## 2023-07-31 RX ORDER — LANOLIN ALCOHOL/MO/W.PET/CERES
6 CREAM (GRAM) TOPICAL
Status: DISCONTINUED | OUTPATIENT
Start: 2023-07-31 | End: 2023-08-03 | Stop reason: HOSPADM

## 2023-07-31 RX ORDER — METHYLPREDNISOLONE SODIUM SUCCINATE 40 MG/ML
40 INJECTION, POWDER, LYOPHILIZED, FOR SOLUTION INTRAMUSCULAR; INTRAVENOUS EVERY 12 HOURS SCHEDULED
Status: DISCONTINUED | OUTPATIENT
Start: 2023-07-31 | End: 2023-08-03

## 2023-07-31 RX ORDER — LEVALBUTEROL INHALATION SOLUTION 1.25 MG/3ML
1.25 SOLUTION RESPIRATORY (INHALATION)
Status: DISCONTINUED | OUTPATIENT
Start: 2023-07-31 | End: 2023-07-31

## 2023-07-31 RX ORDER — ACETAMINOPHEN 325 MG/1
650 TABLET ORAL EVERY 6 HOURS PRN
Status: DISCONTINUED | OUTPATIENT
Start: 2023-07-31 | End: 2023-08-03 | Stop reason: HOSPADM

## 2023-07-31 RX ORDER — ATORVASTATIN CALCIUM 20 MG/1
20 TABLET, FILM COATED ORAL
Status: DISCONTINUED | OUTPATIENT
Start: 2023-07-31 | End: 2023-08-03 | Stop reason: HOSPADM

## 2023-07-31 RX ORDER — SODIUM CHLORIDE FOR INHALATION 0.9 %
12 VIAL, NEBULIZER (ML) INHALATION ONCE
Status: COMPLETED | OUTPATIENT
Start: 2023-07-31 | End: 2023-07-31

## 2023-07-31 RX ADMIN — METHYLPREDNISOLONE SODIUM SUCCINATE 40 MG: 40 INJECTION, POWDER, FOR SOLUTION INTRAMUSCULAR; INTRAVENOUS at 21:31

## 2023-07-31 RX ADMIN — LEVALBUTEROL HYDROCHLORIDE 1.25 MG: 1.25 SOLUTION RESPIRATORY (INHALATION) at 14:06

## 2023-07-31 RX ADMIN — IPRATROPIUM BROMIDE 0.5 MG: 0.5 SOLUTION RESPIRATORY (INHALATION) at 14:06

## 2023-07-31 RX ADMIN — TAMSULOSIN HYDROCHLORIDE 0.4 MG: 0.4 CAPSULE ORAL at 17:57

## 2023-07-31 RX ADMIN — BUDESONIDE 0.5 MG: 0.5 INHALANT ORAL at 19:18

## 2023-07-31 RX ADMIN — ALBUTEROL SULFATE 10 MG: 2.5 SOLUTION RESPIRATORY (INHALATION) at 11:34

## 2023-07-31 RX ADMIN — IPRATROPIUM BROMIDE 0.5 MG: 0.5 SOLUTION RESPIRATORY (INHALATION) at 19:18

## 2023-07-31 RX ADMIN — FAMOTIDINE 20 MG: 20 TABLET, FILM COATED ORAL at 17:57

## 2023-07-31 RX ADMIN — DOCUSATE SODIUM 100 MG: 100 CAPSULE ORAL at 17:57

## 2023-07-31 RX ADMIN — Medication 12 ML: at 11:34

## 2023-07-31 RX ADMIN — IOHEXOL 85 ML: 350 INJECTION, SOLUTION INTRAVENOUS at 13:57

## 2023-07-31 RX ADMIN — LEVALBUTEROL HYDROCHLORIDE 1.25 MG: 1.25 SOLUTION RESPIRATORY (INHALATION) at 19:18

## 2023-07-31 RX ADMIN — POTASSIUM CHLORIDE 20 MEQ: 1500 TABLET, EXTENDED RELEASE ORAL at 17:57

## 2023-07-31 RX ADMIN — IPRATROPIUM BROMIDE 1 MG: 0.5 SOLUTION RESPIRATORY (INHALATION) at 11:34

## 2023-07-31 RX ADMIN — ATORVASTATIN CALCIUM 20 MG: 20 TABLET, FILM COATED ORAL at 17:57

## 2023-07-31 RX ADMIN — HEPARIN SODIUM 5000 UNITS: 5000 INJECTION INTRAVENOUS; SUBCUTANEOUS at 21:32

## 2023-07-31 NOTE — RESPIRATORY THERAPY NOTE
RT Protocol Note  Thomas Romero. 79 y.o. male MRN: [id number]  Unit/Bed#: ED 28 Encounter: [id number]    Assessment    Active Problems: There are no active Hospital Problems. Home Pulmonary Medications:  Ventolin q6 PRN  Pulmicort BID  Performist BID  Duo Q4  Home Devices/Therapy: Home O2, BiPAP/CPAP    Past Medical History:   Diagnosis Date    Acute metabolic encephalopathy     Arthritis     Bladder mass     Cardiomyopathy (HCC)     Chest pain     COPD (chronic obstructive pulmonary disease) (HCC)     COVID-19 virus infection 2023    CPAP (continuous positive airway pressure) dependence     Emphysema of lung (HCC)     Hypoxia     nocturnal    Left bundle branch block     Multiple pulmonary nodules     last assessed: 10/12/16    Pneumonia     Sleep apnea, obstructive     Smoker     Weight loss 2019     Social History     Socioeconomic History    Marital status: /Civil Union     Spouse name: None    Number of children: None    Years of education: None    Highest education level: None   Occupational History    None   Tobacco Use    Smoking status: Former     Packs/day: 2.50     Years: 42.00     Total pack years: 105.00     Types: Cigarettes     Start date:      Quit date:      Years since quittin.5    Smokeless tobacco: Former    Tobacco comments:     1 ppd for 37 years, 2010 down to 5 cigs a day, is around second hand smoke   Vaping Use    Vaping Use: Never used   Substance and Sexual Activity    Alcohol use: Not Currently     Comment: rarely    Drug use: No    Sexual activity: Not Currently     Partners: Female   Other Topics Concern    None   Social History Narrative    Daily coffee consumption: 8 or more cups a day        Used to work in "[employer]tion. On disability.      Social Determinants of Health     Financial Resource Strain: Not on file   Food Insecurity: No Food Insecurity (2023)    Hunger Vital Sign     Worried About Running Out of Food in the Last Year: Never true     [address] in the Last Year: Never true   Transportation Needs: No Transportation Needs (7/20/2023)    PRAPARE - Transportation     Lack of Transportation (Medical): No     Lack of Transportation (Non-Medical): No   Physical Activity: Not on file   Stress: Not on file   Social Connections: Not on file   Intimate Partner Violence: Not on file   Housing Stability: Low Risk  (7/20/2023)    Housing Stability Vital Sign     Unable to Pay for Housing in the Last Year: No     Number of Places Lived in the Last Year: 1     Unstable Housing in the Last Year: No       Subjective         Objective    Physical Exam:   Assessment Type: During-treatment  General Appearance: Drowsy  Respiratory Pattern: Dyspnea at rest (abdominal breathing)  Chest Assessment: Chest expansion symmetrical  Bilateral Breath Sounds: Diminished  O2 Device: BIPAP    Vitals:  Blood pressure 108/71, pulse 100, temperature 98.8 °F (37.1 °C), temperature source Oral, resp. rate (!) 33, SpO2 95 %. Imaging and other studies: I have personally reviewed pertinent films in PACS    O2 Device: BIPAP     Plan    Respiratory Plan: Vent/NIV/HFNC, Home Bronchodilator Patient pathway        Resp Comments: Pt with increases abdominal breathing,complainng of feling tired. Place pt on BIPAP 14/6 30%. Immediate improvement noticed. Bilateral BS diminshed. Pt has hx of COPD, KEVIN. Pt uses BIPAP and Oxygen at home. Pt takes Ventolin,pulmicort, performist and Duonebs.  Will continue to monitor and wean as tolerated

## 2023-07-31 NOTE — ED ATTENDING ATTESTATION
7/31/2023  IMikhail MD, saw and evaluated the patient. I have discussed the patient with the resident/non-physician practitioner and agree with the resident's/non-physician practitioner's findings, Plan of Care, and MDM as documented in the resident's/non-physician practitioner's note, except where noted. All available labs and Radiology studies were reviewed. I was present for key portions of any procedure(s) performed by the resident/non-physician practitioner and I was immediately available to provide assistance. At this point I agree with the current assessment done in the Emergency Department. I have conducted an independent evaluation of this patient a history and physical is as follows: This is a 80-year-old male who presents to the emergency department shortness of breath. Patient has history of severe emphysema, recently admitted to the hospital for respiratory distress and discharged. Patient uses CPAP at home. Was having a difficult time with his CPAP machine, called EMS for respiratory distress. On their arrival, patient was hypoxic. Was placed on high flow at 15 L and given neb. Patient denies fevers. At baseline patient only is on 3 L at home. Patient denies any lateralizing limb swelling. He is not having significant chest pain. Review of systems otherwise -12 systems reviewed. On initial evaluation, patient is in considerable respiratory distress. He has a respiratory rate in the mid 30s. He is satting at 94%. He has adequate blood pressure. On HEENT exam, the patient's face is symmetric. His mucous membranes are little dry. The patient does not have any subcutaneous air in the neck. He is tachycardic without any murmurs, rubs, or gallops. His lungs are quiet, with poor air movement throughout. There are no lateralizing findings. His abdomen is soft and nontender. The patient's extremities are intact. He does not lateralizing edema.   He is neurologically nonfocal.   MEDICAL DECISION MAKING    Number and Complexity of Problems  • Differential diagnosis: Pulmonary embolus, COPD exacerbation, pneumonia, pneumothorax, respiratory failure    Medical Decision Making Data  • External documents reviewed: Hospital admissions from July 19 and July 22 reviewed, both for COPD  • My EKG interpretation:   • My CT interpretation: No large central PE  • My X-ray interpretation:   • My ultrasound interpretation:     CTA ED chest PE Study   Final Result      No pulmonary embolus. Moderate patchy bilateral groundglass opacity and consolidation, new from December 2022, infectious/inflammatory. New 6 mm right upper lobe nodule. Per 2017 Fleischner Society guidelines, recommend follow-up with a chest CT in 6 months. Moderate emphysema. This study was marked in Epic for immediate notification and follow-up. Workstation performed: [id number]         XR chest portable    (Results Pending)       Labs Reviewed   CBC AND DIFFERENTIAL - Abnormal       Result Value Ref Range Status    WBC 20.09 (*) 4.31 - 10.16 Thousand/uL Final    RBC 4.02  3.88 - 5.62 Million/uL Final    Hemoglobin 12.8  12.0 - 17.0 g/dL Final    Hematocrit 40.2  36.5 - 49.3 % Final     (*) 82 - 98 fL Final    MCH 31.8  26.8 - 34.3 pg Final    MCHC 31.8  31.4 - 37.4 g/dL Final    RDW 12.6  11.6 - 15.1 % Final    MPV 9.8  8.9 - 12.7 fL Final    Platelets 741  371 - 390 Thousands/uL Final    nRBC 0  /100 WBCs Final    Comment: This is an appended report. These results have been appended to a previously preliminary verified report.     Neutrophils Relative 93 (*) 43 - 75 % Final    Immat GRANS % 1  0 - 2 % Final    Lymphocytes Relative 1 (*) 14 - 44 % Final    Monocytes Relative 5  4 - 12 % Final    Eosinophils Relative 0  0 - 6 % Final    Basophils Relative 0  0 - 1 % Final    Neutrophils Absolute 18.84 (*) 1.85 - 7.62 Thousands/µL Final    Immature Grans Absolute 0.10  0.00 - 0.20 Thousand/uL Final    Lymphocytes Absolute 0.20 (*) 0.60 - 4.47 Thousands/µL Final    Monocytes Absolute 0.93  0.17 - 1.22 Thousand/µL Final    Eosinophils Absolute 0.00  0.00 - 0.61 Thousand/µL Final    Basophils Absolute 0.02  0.00 - 0.10 Thousands/µL Final    Narrative: This is an appended report. These results have been appended to a previously verified report. BASIC METABOLIC PANEL - Abnormal    Sodium 129 (*) 135 - 147 mmol/L Final    Potassium 4.2  3.5 - 5.3 mmol/L Final    Chloride 81 (*) 96 - 108 mmol/L Final    CO2 44 (*) 21 - 32 mmol/L Final    ANION GAP 4  mmol/L Final    BUN 32 (*) 5 - 25 mg/dL Final    Creatinine 0.66  0.60 - 1.30 mg/dL Final    Comment: Standardized to IDMS reference method    Glucose 151 (*) 65 - 140 mg/dL Final    Comment: Specimen collection should occur prior to Sulfasalazine administration due to the potential for falsely depressed results. Specimen collection should occur prior to Sulfapyridine administration due to the potential for falsely elevated results. If the patient is fasting, the ADA then defines impaired fasting glucose as > 100 mg/dL and diabetes as > or equal to 123 mg/dL.     Calcium 9.8  8.3 - 10.1 mg/dL Final    eGFR 100  ml/min/1.73sq m Final    Narrative:     [facility] guidelines for Chronic Kidney Disease (CKD):   •  Stage 1 with normal or high GFR (GFR > 90 mL/min/1.73 square meters)  •  Stage 2 Mild CKD (GFR = 60-89 mL/min/1.73 square meters)  •  Stage 3A Moderate CKD (GFR = 45-59 mL/min/1.73 square meters)  •  Stage 3B Moderate CKD (GFR = 30-44 mL/min/1.73 square meters)  •  Stage 4 Severe CKD (GFR = 15-29 mL/min/1.73 square meters)  •  Stage 5 End Stage CKD (GFR <15 mL/min/1.73 square meters)  Note: GFR calculation is accurate only with a steady state creatinine   HS TROPONIN I 0HR - Abnormal    hs TnI 0hr 50 (*) "Refer to ACS Flowchart"- see link ng/L Final    Comment:                                              Initial (time 0) result  If >=50 ng/L, Myocardial injury suggested ;  Type of myocardial injury and treatment strategy  to be determined. If 5-49 ng/L, a delta result at 2 hours and or 4 hours will be needed to further evaluate. If <4 ng/L, and chest pain has been >3 hours since onset, patient may qualify for discharge based on the HEART score in the ED. If <5 ng/L and <3hours since onset of chest pain, a delta result at 2 hours will be needed to further evaluate. HS Troponin 99th Percentile URL of a Health Population=12 ng/L with a 95% Confidence Interval of 8-18 ng/L. Second Troponin (time 2 hours)  If calculated delta >= 20 ng/L,  Myocardial injury suggested ; Type of myocardial injury and treatment strategy to be determined. If 5-49 ng/L and the calculated delta is 5-19 ng/L, consult medical service for evaluation. Continue evaluation for ischemia on ecg and other possible etiology and repeat hs troponin at 4 hours. If delta is <5 ng/L at 2 hours, consider discharge based on risk stratification via the HEART score (if in ED), or MARK risk score in IP/Observation. HS Troponin 99th Percentile URL of a Health Population=12 ng/L with a 95% Confidence Interval of 8-18 ng/L. D-DIMER, QUANTITATIVE - Abnormal    D-Dimer, Quant 1.03 (*) <0.50 ug/ml FEU Final    Comment: Reference and upper limits to exclude DVT and PE are the same. Do not use to exclude if clinical symptoms are present. Narrative: In the evaluation for possible pulmonary embolism, in the appropriate (Well's Score of 4 or less) patient, the age adjusted d-dimer cutoff for this patient can be calculated as:    Age x 0.01 (in ug/mL) for Age-adjusted D-dimer exclusion threshold for a patient over 50 years.    BLOOD GAS, VENOUS - Abnormal    pH, Oscar 7.390  7.300 - 7.400 Final    pCO2, Oscar 76.7 (*) 42.0 - 50.0 mm Hg Final    pO2, Oscar 42.4  35.0 - 45.0 mm Hg Final    HCO3, Oscar 45.4 (*) 24 - 30 mmol/L Final    Base Excess, Oscar 17.0  mmol/L Final O2 Content, Oscar 12.3  ml/dL Final    O2 HGB, VENOUS 74.6  60.0 - 80.0 % Final   HS TROPONIN I 2HR - Normal    hs TnI 2hr 43  "Refer to ACS Flowchart"- see link ng/L Final    Comment:                                              Initial (time 0) result  If >=50 ng/L, Myocardial injury suggested ;  Type of myocardial injury and treatment strategy  to be determined. If 5-49 ng/L, a delta result at 2 hours and or 4 hours will be needed to further evaluate. If <4 ng/L, and chest pain has been >3 hours since onset, patient may qualify for discharge based on the HEART score in the ED. If <5 ng/L and <3hours since onset of chest pain, a delta result at 2 hours will be needed to further evaluate. HS Troponin 99th Percentile URL of a Health Population=12 ng/L with a 95% Confidence Interval of 8-18 ng/L. Second Troponin (time 2 hours)  If calculated delta >= 20 ng/L,  Myocardial injury suggested ; Type of myocardial injury and treatment strategy to be determined. If 5-49 ng/L and the calculated delta is 5-19 ng/L, consult medical service for evaluation. Continue evaluation for ischemia on ecg and other possible etiology and repeat hs troponin at 4 hours. If delta is <5 ng/L at 2 hours, consider discharge based on risk stratification via the HEART score (if in ED), or MARK risk score in IP/Observation. HS Troponin 99th Percentile URL of a Health Population=12 ng/L with a 95% Confidence Interval of 8-18 ng/L. Delta 2hr hsTnI -7  <20 ng/L Final   HS TROPONIN I 4HR       • Labs reviewed by me are significant for: Elevated D-dimer    Clinical decision rules/scores are significant for: Moderate risk for PE, elevated D-dimer, will CT  • Discussed case with:  • Considered admission for: 1 hypoxic respiratory failure, 2 COPD exacerbation    Treatment and Disposition  ED course: Patient receiving nebs in the emergency department, still with ongoing increased work of breathing, retracting, paradoxical breathing. Patient placed on BiPAP, gas ordered  Shared decision making:  Code status:     ED Course         Critical Care Time  CriticalCare Time    Date/Time: 7/31/2023 1:06 PM    Performed by: Catie Cruz MD  Authorized by: Catie Cruz MD    Critical care provider statement:     Critical care time (minutes):  42    Critical care time was exclusive of:  Separately billable procedures and treating other patients and teaching time    Critical care was necessary to treat or prevent imminent or life-threatening deterioration of the following conditions:  Respiratory failure    Critical care was time spent personally by me on the following activities:  Blood draw for specimens, obtaining history from patient or surrogate, development of treatment plan with patient or surrogate, discussions with consultants, discussions with primary provider, evaluation of patient's response to treatment, examination of patient, review of old charts, re-evaluation of patient's condition, ordering and review of radiographic studies, ordering and review of laboratory studies and ordering and performing treatments and interventions

## 2023-07-31 NOTE — ASSESSMENT & PLAN NOTE
Presenting SIRS criteria include tachycardia/tachypnea coupled with leukocytosis  Monitor vitals and maintain hemodynamics  Likely reactive due to presenting medical history - currently afebrile  CXR negative for obvious obstructive processes, however, CT imaging did reveal some bilateral groundglass opacities possibly inflammatory vs infectious -> clinically doubt infection, however, will check procalcitonin, and if elevated, consider initiation of antibiotics

## 2023-07-31 NOTE — RESPIRATORY THERAPY NOTE
07/31/23 1141   Respiratory Assessment   Assessment Type During-treatment   General Appearance Sleeping   Respiratory Pattern Normal   Chest Assessment Chest expansion symmetrical   Bilateral Breath Sounds Diminished   Resp Comments Pt with abdomianl muscle use. Pt known to [facility]. Pt started on BISHOP neb. Bilateral BS diminshed. Will continiue to moniotr. Pt wears 6-8 L @ home.    O2 Device nc6   Additional Assessments   Pulse 98   Respirations (!) 26   SpO2 99 %   Position Semi-Shaffer's

## 2023-07-31 NOTE — ASSESSMENT & PLAN NOTE
Has been on an ongoing Prednisone tapering course outpatient from recent hospitalization/discharge earlier this month  Hold PO Prednisone -> initiate an IV Solu-Medrol course - continue Pulmicort/Perforomist - continue nebulization   Pulmonology to follow

## 2023-07-31 NOTE — ASSESSMENT & PLAN NOTE
Troponin peak of 50 with a subsequent downtrend  Clinicall likely a non-MI troponin elevation in the setting of above issues

## 2023-07-31 NOTE — CASE COMMUNICATION
telehealth phonecall to patient to assess how he is doing and provide education. Per spouse they contacted [facility] last night as the "bipap" was not working. The patient did not feel any pressure coming through. They were told to stay on o2 via concentrator and to call [employer] back at 830am. The wife called this morning and still did not receive a return phonecall. The patient is having SOB and his spo2 on 8L has been decreasing  from 91% to 84%. He also has a lot of swelling in his legs. She is going to call 911 and have him taken to [facility]. Sn called [facility] to follow up and notify of this issue and they state that the patient has a ventilator, not a bipap. If the wife would've said ventilator last night when calling in, they would've sent someone out. They will send the patient's info to the respiratory therapist and have her contact the patient.  ca lled spouse back and notified of above and that the patient has a ventilator. She was unaware and thought it was a bipap. She already called EMS and is awaiting for them to arrive.

## 2023-07-31 NOTE — RESPIRATORY THERAPY NOTE
Resp care   07/31/23 1737   Respiratory Assessment   Resp Comments UDN changed to q6 with xopenex and atrovent per resp protocol.

## 2023-07-31 NOTE — ED PROVIDER NOTES
History  Chief Complaint   Patient presents with   • Shortness of Breath     Patient was recently discharged for respiratory distress. Patient on 3L nasal cannula at home, reports feeling increased sob for two days. States since he has been set up with a new cpap machine, he has been having sob. HPI    80-year-old male with past medical history significant for end-stage COPD, chronic heart failure with reduced ejection fraction, prediabetes, prostate cancer presents to the ED for evaluation of increased shortness of breath x2 days. Patient was admitted for COPD exacerbation twice earlier in the month. Patient states that he recently received a new BiPAP machine that has not been functioning. Denies any fever, chills, increased cough, chest pain, abdominal pain, nausea, vomiting, diarrhea, dizziness. Patient arrived via EMS who placed the patient on a nonrebreather. States he is typically on 3L NC at home. Prior to Admission Medications   Prescriptions Last Dose Informant Patient Reported? Taking?    Diclofenac Sodium (VOLTAREN) 1 %  Spouse/Significant Other, Self Yes No   Sig: APPLY 2 GRAMS 4 TIMES A DAY   Melatonin 5 MG TABS  Spouse/Significant Other, Self Yes No   Sig: Take 1 tablet by mouth daily at bedtime   acetaminophen (TYLENOL) 325 mg tablet  Spouse/Significant Other, Self No No   Sig: Take 3 tablets (975 mg total) by mouth every 8 (eight) hours as needed for mild pain or moderate pain   albuterol (PROVENTIL HFA,VENTOLIN HFA) 90 mcg/act inhaler  Self, Spouse/Significant Other No No   Sig: Inhale 2 puffs every 6 (six) hours as needed for wheezing   aspirin 81 mg chewable tablet  Self, Spouse/Significant Other No No   Sig: Chew 1 tablet (81 mg total) daily   azithromycin (ZITHROMAX) 500 MG tablet   No No   Sig: Take 1 tablet (500 mg total) by mouth every 24 hours for 18 doses   budesonide (PULMICORT) 0.5 mg/2 mL nebulizer solution  Self, Spouse/Significant Other No No   Sig: TAKE 2 ML (0.5 MG TOTAL) BY NEBULIZATION TWICE A DAY RINSE MOUTH AFTER USE   cyanocobalamin (VITAMIN B-12) 1000 MCG tablet  Self, Spouse/Significant Other No No   Sig: Take 1 tablet (1,000 mcg total) by mouth daily   docusate sodium (COLACE) 100 mg capsule   No No   Sig: Take 1 capsule (100 mg total) by mouth 2 (two) times a day   famotidine (PEPCID) 20 mg tablet   No No   Sig: Take 1 tablet (20 mg total) by mouth 2 (two) times a day for 14 days   formoterol (PERFOROMIST) 20 MCG/2ML nebulizer solution  Self, Spouse/Significant Other Yes No   Sig: TAKE 2 ML (20 MCG TOTAL) BY NEBULIZATION 2 (TWO) TIMES A DAY   ipratropium-albuterol (DUO-NEB) 0.5-2.5 mg/3 mL nebulizer solution   Yes No   Sig: Take 3 mL by nebulization 4 (four) times a day   potassium chloride (K-DUR,KLOR-CON) 20 mEq tablet   No No   Sig: Take 1 tablet (20 mEq total) by mouth 2 (two) times a day   predniSONE 10 mg tablet   No No   Sig: Take 4 tablets (40 mg total) by mouth daily for 1 day, THEN 3 tablets (30 mg total) daily for 3 days, THEN 2 tablets (20 mg total) daily for 3 days, THEN 1 tablet (10 mg total) daily for 3 days. predniSONE 5 mg tablet   No No   Sig: Take 1 tablet (5 mg total) by mouth daily Do not start before 2023.    rosuvastatin (CRESTOR) 10 MG tablet  Spouse/Significant Other, Self No No   Sig: Take 1 tablet (10 mg total) by mouth daily   senna (SENOKOT) 8.6 mg   No No   Sig: Take 1 tablet (8.6 mg total) by mouth daily at bedtime   sodium chloride (OCEAN) 0.65 % nasal spray  Self, Spouse/Significant Other No No   Si spray into each nostril every hour as needed for congestion   tamsulosin (FLOMAX) 0.4 mg  Spouse/Significant Other, Self No No   Sig: Take 1 capsule (0.4 mg total) by mouth daily with dinner   torsemide (DEMADEX) 10 mg tablet   No No   Sig: Take 4 tablets (40 mg total) by mouth daily      Facility-Administered Medications: None       Past Medical History:   Diagnosis Date   • Acute metabolic encephalopathy    • Arthritis    • Bladder mass    • Cardiomyopathy [facility])    • Chest pain    • COPD (chronic obstructive pulmonary disease) ([facility])    • COVID-19 virus infection 2023   • CPAP (continuous positive airway pressure) dependence    • Emphysema of lung (HCC)    • Hypoxia     nocturnal   • Left bundle branch block    • Multiple pulmonary nodules     last assessed: 10/12/16   • Pneumonia    • Sleep apnea, obstructive    • Smoker    • Weight loss 2019       Past Surgical History:   Procedure Laterality Date   • COLONOSCOPY     • CYSTOSCOPY     • CYSTOSCOPY  2021   • NM BLADDER INSTILLATION ANTICARCINOGENIC AGENT N/A 1/3/2020    Procedure: INSTILLATION MITOMYCIN;  Surgeon: Jl Whitaker MD;  [facility];  Service: Urology   • NM CYSTO W/REMOVAL OF LESIONS SMALL N/A 1/3/2020    Procedure: TRANSURETHRAL RESECTION OF BLADDER TUMOR (TURBT); Surgeon: Jl Whitaker MD;  Location[facility];  Service: Urology   • NM CYSTOURETHROSCOPY WITH BIOPSY N/A 2021    Procedure: Bhaskar Vance;  Surgeon: Jl Whitaker MD;  [facility];  Service: Urology   • NM TRURL ELECTROSURG RESCJ PROSTATE BLEED COMPLETE N/A 2021    Procedure: TRANSURETHRAL RESECTION OF PROSTATE (TURP) BLADDER BIOPSY, FULGURATION;  Surgeon: Jl Whitaker MD;  [facility];  Service: Urology       Family History   Problem Relation Age of Onset   • Diabetes Mother      I have reviewed and agree with the history as documented.     E-Cigarette/Vaping   • E-Cigarette Use Never User      E-Cigarette/Vaping Substances   • Nicotine No    • THC No    • CBD No    • Flavoring No    • Other No    • Unknown No      Social History     Tobacco Use   • Smoking status: Former     Packs/day: 2.50     Years: 42.00     Total pack years: 105.00     Types: Cigarettes     Start date: 5     Quit date:      Years since quittin.5   • Smokeless tobacco: Former   • Tobacco comments:     1 ppd for 37 years,  down to 5 cigs a day, is around second hand smoke   Vaping Use   • Vaping Use: Never used   Substance Use Topics   • Alcohol use: Not Currently     Comment: rarely   • Drug use: No        Review of Systems   Constitutional: Negative for chills and fever. HENT: Negative for ear pain and sore throat. Eyes: Negative for pain and visual disturbance. Respiratory: Positive for cough and shortness of breath. Cardiovascular: Negative for chest pain and palpitations. Gastrointestinal: Negative for abdominal pain, diarrhea, nausea and vomiting. Genitourinary: Negative for dysuria and hematuria. Musculoskeletal: Negative for arthralgias and back pain. Skin: Negative for color change and rash. Neurological: Negative for dizziness, seizures, syncope, light-headedness and headaches. All other systems reviewed and are negative. Physical Exam  ED Triage Vitals   Temperature Pulse Respirations Blood Pressure SpO2   07/31/23 1103 07/31/23 1051 07/31/23 1051 07/31/23 1051 07/31/23 1051   98.8 °F (37.1 °C) 102 22 108/71 100 %      Temp Source Heart Rate Source Patient Position - Orthostatic VS BP Location FiO2 (%)   07/31/23 1103 07/31/23 1051 07/31/23 1051 07/31/23 1051 --   Oral Monitor; Apical Sitting Left arm       Pain Score       --                    Orthostatic Vital Signs  Vitals:    07/31/23 1230 07/31/23 1407 07/31/23 1415 07/31/23 1600   BP:   97/56 106/68   Pulse: 100 95 100 92   Patient Position - Orthostatic VS:           Physical Exam  Vitals and nursing note reviewed. Constitutional:       General: He is in acute distress. Appearance: He is well-developed. He is not ill-appearing. HENT:      Head: Normocephalic and atraumatic. Eyes:      Conjunctiva/sclera: Conjunctivae normal.   Cardiovascular:      Rate and Rhythm: Normal rate and regular rhythm. Heart sounds: No murmur heard. Pulmonary:      Effort: Tachypnea and accessory muscle usage present. No respiratory distress. Breath sounds: Decreased breath sounds and wheezing present. Abdominal:      Palpations: Abdomen is soft. Tenderness: There is no abdominal tenderness. Musculoskeletal:         General: No swelling. Cervical back: Neck supple. Skin:     General: Skin is warm and dry. Capillary Refill: Capillary refill takes less than 2 seconds. Neurological:      General: No focal deficit present. Mental Status: He is alert and oriented to person, place, and time.    Psychiatric:         Mood and Affect: Mood normal.         ED Medications  Medications   budesonide (PULMICORT) inhalation solution 0.5 mg (has no administration in time range)   formoterol (PERFOROMIST) nebulizer solution 20 mcg (has no administration in time range)   levalbuterol (XOPENEX) inhalation solution 1.25 mg (1.25 mg Nebulization Given 7/31/23 1406)   ipratropium (ATROVENT) 0.02 % inhalation solution 0.5 mg (0.5 mg Nebulization Given 7/31/23 1406)   heparin (porcine) subcutaneous injection 5,000 Units (has no administration in time range)   albuterol inhalation solution 10 mg (10 mg Nebulization Given 7/31/23 1134)   ipratropium (ATROVENT) 0.02 % inhalation solution 1 mg (1 mg Nebulization Given 7/31/23 1134)   sodium chloride 0.9 % inhalation solution 12 mL (12 mL Nebulization Given 7/31/23 1134)   iohexol (OMNIPAQUE) 350 MG/ML injection (SINGLE-DOSE) 85 mL (85 mL Intravenous Given 7/31/23 1357)       Diagnostic Studies  Results Reviewed     Procedure Component Value Units Date/Time    HS Troponin I 4hr [[id number]]  (Normal) Collected: 07/31/23 1601    Lab Status: Final result Specimen: Blood from Arm, Right Updated: 07/31/23 1633     hs TnI 4hr 41 ng/L      Delta 4hr hsTnI -9 ng/L     HS Troponin I 2hr [[id number]]  (Normal) Collected: 07/31/23 1321    Lab Status: Final result Specimen: Blood from Arm, Right Updated: 07/31/23 1404     hs TnI 2hr 43 ng/L      Delta 2hr hsTnI -7 ng/L     Blood gas, venous [[id number]]  (Abnormal) Collected: 07/31/23 1225    Lab Status: Final result Specimen: Blood from Arm, Right Updated: 07/31/23 1234     pH, Oscar 7.390     pCO2, Oscar 76.7 mm Hg      pO2, Oscar 42.4 mm Hg      HCO3, Oscar 45.4 mmol/L      Base Excess, Oscar 17.0 mmol/L      O2 Content, Oscar 12.3 ml/dL      O2 HGB, VENOUS 74.6 %     CBC and differential [[id number]]  (Abnormal) Collected: 07/31/23 1121    Lab Status: Final result Specimen: Blood from Arm, Right Updated: 07/31/23 1216     WBC 20.09 Thousand/uL      RBC 4.02 Million/uL      Hemoglobin 12.8 g/dL      Hematocrit 40.2 %       fL      MCH 31.8 pg      MCHC 31.8 g/dL      RDW 12.6 %      MPV 9.8 fL      Platelets 942 Thousands/uL      nRBC 0 /100 WBCs      Neutrophils Relative 93 %      Immat GRANS % 1 %      Lymphocytes Relative 1 %      Monocytes Relative 5 %      Eosinophils Relative 0 %      Basophils Relative 0 %      Neutrophils Absolute 18.84 Thousands/µL      Immature Grans Absolute 0.10 Thousand/uL      Lymphocytes Absolute 0.20 Thousands/µL      Monocytes Absolute 0.93 Thousand/µL      Eosinophils Absolute 0.00 Thousand/µL      Basophils Absolute 0.02 Thousands/µL     Narrative: This is an appended report. These results have been appended to a previously verified report.     Basic metabolic panel [[id number]]  (Abnormal) Collected: 07/31/23 1121    Lab Status: Final result Specimen: Blood from Arm, Right Updated: 07/31/23 1216     Sodium 129 mmol/L      Potassium 4.2 mmol/L      Chloride 81 mmol/L      CO2 44 mmol/L      ANION GAP 4 mmol/L      BUN 32 mg/dL      Creatinine 0.66 mg/dL      Glucose 151 mg/dL      Calcium 9.8 mg/dL      eGFR 100 ml/min/1.73sq m     Narrative:      [place] guidelines for Chronic Kidney Disease (CKD):   •  Stage 1 with normal or high GFR (GFR > 90 mL/min/1.73 square meters)  •  Stage 2 Mild CKD (GFR = 60-89 mL/min/1.73 square meters)  •  Stage 3A Moderate CKD (GFR = 45-59 mL/min/1.73 square meters)  •  Stage 3B Moderate CKD (GFR = 30-44 mL/min/1.73 square meters)  •  Stage 4 Severe CKD (GFR = 15-29 mL/min/1.73 square meters)  •  Stage 5 End Stage CKD (GFR <15 mL/min/1.73 square meters)  Note: GFR calculation is accurate only with a steady state creatinine    HS Troponin 0hr (reflex protocol) [[id number]]  (Abnormal) Collected: 07/31/23 1121    Lab Status: Final result Specimen: Blood from Arm, Right Updated: 07/31/23 1204     hs TnI 0hr 50 ng/L     D-Dimer [[id number]]  (Abnormal) Collected: 07/31/23 1121    Lab Status: Final result Specimen: Blood from Arm, Right Updated: 07/31/23 1202     D-Dimer, Quant 1.03 ug/ml FEU     Narrative: In the evaluation for possible pulmonary embolism, in the appropriate (Well's Score of 4 or less) patient, the age adjusted d-dimer cutoff for this patient can be calculated as:    Age x 0.01 (in ug/mL) for Age-adjusted D-dimer exclusion threshold for a patient over 50 years. CTA ED chest PE Study   Final Result by Richard Parekh MD (07/31 1424)      No pulmonary embolus. Moderate patchy bilateral groundglass opacity and consolidation, new from December 2022, infectious/inflammatory. New 6 mm right upper lobe nodule. Per 2017 Fleischner Society guidelines, recommend follow-up with a chest CT in 6 months. Moderate emphysema. This study was marked in Epic for immediate notification and follow-up. Workstation performed: [id number]         XR chest portable    (Results Pending)         Procedures  ECG 12 Lead Documentation Only    Date/Time: 7/31/2023 11:54 AM    Performed by: Lu Gonsales MD  Authorized by:  Lu Gonsales MD    Indications / Diagnosis:  Sob  ECG reviewed by me, the ED Provider: yes    Patient location:  ED  Previous ECG:     Previous ECG:  Compared to current    Comparison ECG info:  7/25/2023 1234  Quality:     Tracing quality:  Limited by artifact  Rate:     ECG rate:  102    ECG rate assessment: tachycardic    Rhythm: Rhythm: sinus tachycardia    Comments:      LBBB, present on previous EKG           ED Course  ED Course as of 07/31/23 1709   Mon Jul 31, 2023   1126 Blood Pressure: 108/71   1126 Temperature: 98.8 °F (37.1 °C)   1126 Temp Source: Oral   1126 Pulse: 102   1126 Respirations: 22   1126 SpO2: 99 %   1126 Transitioned from NRB to 6L NC    1135 WBC(!): 20.09   1218 Sodium(!): 129  137 two days ago    1219 CO2(!): 44   1219 hs TnI 0hr(!): 50   1222 D-Dimer, Quant(!): 1.03   1235 pH, Oscar: 7.390   1235 pCO2, Oscar(!): 76.7   1404 Delta 2hr hsTnI: -7   46 CTA ED chest PE Study  No pulmonary embolus.     Moderate patchy bilateral groundglass opacity and consolidation, new from December 2022, infectious/inflammatory.     New 6 mm right upper lobe nodule. Per 2017 Fleischner Society guidelines, recommend follow-up with a chest CT in 6 months.     Moderate emphysema. 18 Reached out to [facility]    1540 Admitted to [facility] for acute resp failure on BIPAP                                        Medical Decision Making  Amount and/or Complexity of Data Reviewed  Labs: ordered. Decision-making details documented in ED Course. Radiology: ordered. Decision-making details documented in ED Course. Risk  Prescription drug management. Decision regarding hospitalization. 80-year-old male with past medical history significant for end-stage COPD, chronic heart failure with reduced ejection fraction, prediabetes, prostate cancer presents to the ED for evaluation of increased shortness of breath x2 days. States he recently received a new BiPAP machine that has not been functioning. On 3 L nasal cannula at home. Hemodynamically stable. Tachypneic with respiratory rate in the 30s and using accessory respiratory muscles. Decreased breath sounds and wheezing throughout. EKG limited by artifact shows sinus tachycardia with , LBBB present on previous EKG, no acute ischemia. Leukocytosis at 20.09. Hyponatremic 129.  Elevated ddimer which prompted CT PE study which showed no PE, moderate patchy bilateral groundglass opacity and consolidation. BISHOP given and patient was placed on BIPAP. Discussed findings with patient as well as admission to hospital, patient agreeable. Patient admitted to [facility]. Disposition  Final diagnoses:   COPD exacerbation ([address])   Hypercapnia   Acute respiratory failure with hypoxemia (HCC)   CHF (congestive heart failure) (HCC)   Hypernatremia   Leukocytosis   Elevated troponin     Time reflects when diagnosis was documented in both MDM as applicable and the Disposition within this note     Time User Action Codes Description Comment    7/31/2023  3:37 PM Merilee Harrison T Add [J44.1] COPD exacerbation ([address])     7/31/2023  3:38 PM Merilee Harrison T Add [R06.89] Hypercapnia     7/31/2023  3:38 PM Merilee Harrison T Add [J96.01] Acute respiratory failure with hypoxemia ([address])     7/31/2023  3:38 PM Merilee Harrison T Add [I50.9] CHF (congestive heart failure) ([address])     7/31/2023  3:38 PM Merilee Harrison T Add [E87.0] Hypernatremia     7/31/2023  3:39 PM Merilee Harrison T Add [D72.829] Leukocytosis     7/31/2023  3:39 PM Merilee Harrison T Add [R77.8] Elevated troponin       ED Disposition     ED Disposition   Admit    Condition   Stable    Date/Time   Mon Jul 31, 2023  3:37 PM    Comment   Case was discussed with Dr. Leonardo Wright and the patient's admission status was agreed to be Admission Status: inpatient status to the service of Dr. Leonardo Wright.            Follow-up Information    None         Current Discharge Medication List      CONTINUE these medications which have NOT CHANGED    Details   acetaminophen (TYLENOL) 325 mg tablet Take 3 tablets (975 mg total) by mouth every 8 (eight) hours as needed for mild pain or moderate pain  Refills: 0    Associated Diagnoses: COPD (chronic obstructive pulmonary disease) ([facility])      albuterol (PROVENTIL HFA,VENTOLIN HFA) 90 mcg/act inhaler Inhale 2 puffs every 6 (six) hours as needed for wheezing  Qty: 8.5 g, Refills: 2    Comments: DX Code Needed  . Associated Diagnoses: Chronic obstructive pulmonary disease, unspecified COPD type ([address])      aspirin 81 mg chewable tablet Chew 1 tablet (81 mg total) daily  Qty: 30 tablet, Refills: 0    Associated Diagnoses: Acute respiratory failure with hypoxia and hypercapnia (HCC); COPD with acute exacerbation (HCC)      azithromycin (ZITHROMAX) 500 MG tablet Take 1 tablet (500 mg total) by mouth every 24 hours for 18 doses  Qty: 18 tablet, Refills: 0    Associated Diagnoses: COPD with acute exacerbation ([facility])      budesonide (PULMICORT) 0.5 mg/2 mL nebulizer solution TAKE 2 ML (0.5 MG TOTAL) BY NEBULIZATION TWICE A DAY RINSE MOUTH AFTER USE  Qty: 360 mL, Refills: 2    Associated Diagnoses: Pulmonary emphysema, unspecified emphysema type ([facility])      cyanocobalamin (VITAMIN B-12) 1000 MCG tablet Take 1 tablet (1,000 mcg total) by mouth daily  Qty: 30 tablet, Refills: 0    Associated Diagnoses: Anemia      Diclofenac Sodium (VOLTAREN) 1 % APPLY 2 GRAMS 4 TIMES A DAY      docusate sodium (COLACE) 100 mg capsule Take 1 capsule (100 mg total) by mouth 2 (two) times a day  Qty: 60 capsule, Refills: 0    Associated Diagnoses: Constipation      famotidine (PEPCID) 20 mg tablet Take 1 tablet (20 mg total) by mouth 2 (two) times a day for 14 days  Qty: 28 tablet, Refills: 0    Associated Diagnoses: Current use of steroid medication      formoterol (PERFOROMIST) 20 MCG/2ML nebulizer solution TAKE 2 ML (20 MCG TOTAL) BY NEBULIZATION 2 (TWO) TIMES A DAY      ipratropium-albuterol (DUO-NEB) 0.5-2.5 mg/3 mL nebulizer solution Take 3 mL by nebulization 4 (four) times a day      Melatonin 5 MG TABS Take 1 tablet by mouth daily at bedtime      potassium chloride (K-DUR,KLOR-CON) 20 mEq tablet Take 1 tablet (20 mEq total) by mouth 2 (two) times a day  Qty: 60 tablet, Refills: 0    Associated Diagnoses: Acute on chronic HFrEF (heart failure with reduced ejection fraction) ([facility])      ! ! predniSONE 10 mg tablet Take 4 tablets (40 mg total) by mouth daily for 1 day, THEN 3 tablets (30 mg total) daily for 3 days, THEN 2 tablets (20 mg total) daily for 3 days, THEN 1 tablet (10 mg total) daily for 3 days. Qty: 22 tablet, Refills: 0    Associated Diagnoses: COPD exacerbation ([address])      ! ! predniSONE 5 mg tablet Take 1 tablet (5 mg total) by mouth daily Do not start before August 8, 2023. Qty: 30 tablet, Refills: 0    Associated Diagnoses: Acute respiratory failure, unspecified whether with hypoxia or hypercapnia (HCC)      rosuvastatin (CRESTOR) 10 MG tablet Take 1 tablet (10 mg total) by mouth daily  Qty: 90 tablet, Refills: 3    Associated Diagnoses: Pulmonary emphysema, unspecified emphysema type (HCC)      senna (SENOKOT) 8.6 mg Take 1 tablet (8.6 mg total) by mouth daily at bedtime  Qty: 30 tablet, Refills: 0    Associated Diagnoses: Constipation      sodium chloride (OCEAN) 0.65 % nasal spray 1 spray into each nostril every hour as needed for congestion  Qty: 30 mL, Refills: 0    Associated Diagnoses: Acute respiratory failure, unspecified whether with hypoxia or hypercapnia (HCC)      tamsulosin (FLOMAX) 0.4 mg Take 1 capsule (0.4 mg total) by mouth daily with dinner  Refills: 0    Associated Diagnoses: Urinary retention; BPH with obstruction/lower urinary tract symptoms      torsemide (DEMADEX) 10 mg tablet Take 4 tablets (40 mg total) by mouth daily  Qty: 90 tablet, Refills: 0    Associated Diagnoses: Chronic HFrEF (heart failure with reduced ejection fraction) ([address])       ! ! - Potential duplicate medications found. Please discuss with provider. No discharge procedures on file. PDMP Review       Value Time User    PDMP Reviewed  Yes 7/23/2023  9:39 AM Gisela Sams MD           ED Provider  Attending physically available and evaluated Latoya Rose. . I managed the patient along with the ED Attending.     Electronically Signed by         Armond Vallejo MD  07/31/23 5127

## 2023-07-31 NOTE — ASSESSMENT & PLAN NOTE
Last EF of 20% in May 2023  Low-sodium diet with fluid restriction enforced  On diuresis chronically with Demadex (w/ potassium supplementation)  Previously deemed by cardiology as not a candidate for advanced heart failure therapies

## 2023-07-31 NOTE — RESPIRATORY THERAPY NOTE
07/31/23 1655   Respiratory Assessment   Resp Comments Trasported patient from ED 28 to ICCU 9 without icnident.  Report given to unit therapist   Transport   Patient Status Inpatient   Tolerated Well Yes

## 2023-07-31 NOTE — ASSESSMENT & PLAN NOTE
Per patient, BIPAP machine has been "broken" over the last few days -> per patient, he has made attempts to contact his outpatient PCP who urged him to contact the machine  to "fix" the problem (unverified with PCP at this time)  Placed on BIPAP in the ED over a few hours with patient reporting improvement in shortness of breath and wheezing (see plan for COPD below)  Await pulmonology input

## 2023-07-31 NOTE — H&P
[address] [facility]  H&P  Name: Brooke Jesus. 79 y.o. male I MRN: [id number]  Unit/Bed#: ICCU 209-01 I Date of Admission: 7/31/2023   Date of Service: 7/31/2023 I Hospital Day: 0      Assessment & Plan:    * Acute on chronic respiratory failure with hypoxia and hypercapnia   Assessment & Plan  Per patient, BIPAP machine has been "broken" over the last few days -> per patient, he has made attempts to contact his outpatient PCP who urged him to contact the machine  to "fix" the problem (unverified with PCP at this time)  Placed on BIPAP in the ED over a few hours with patient reporting improvement in shortness of breath and wheezing (see plan for COPD below)  Await pulmonology input    COPD exacerbation  Assessment & Plan  Has been on an ongoing Prednisone tapering course outpatient from recent hospitalization/discharge earlier this month  Hold PO Prednisone -> initiate an IV Solu-Medrol course - continue Pulmicort/Perforomist - continue nebulization   Pulmonology to follow    SIRS (systemic inflammatory response syndrome)  Assessment & Plan  Presenting SIRS criteria include tachycardia/tachypnea coupled with leukocytosis  Monitor vitals and maintain hemodynamics  Likely reactive due to presenting medical history - currently afebrile  CXR negative for obvious obstructive processes, however, CT imaging did reveal some bilateral groundglass opacities possibly inflammatory vs infectious -> clinically doubt infection, however, will check procalcitonin, and if elevated, consider initiation of antibiotics    Chronic HFrEF (heart failure with reduced ejection fraction)   Assessment & Plan  Last EF of 20% in May 2023  Low-sodium diet with fluid restriction enforced  On diuresis chronically with Demadex (w/ potassium supplementation)  Previously deemed by cardiology as not a candidate for advanced heart failure therapies    Severe protein-calorie malnutrition   Assessment & Plan  BMI of 17.67 in the setting of chronic illness coupled with decreased appetite/oral intake and evidence of muscle wasting/atrophy and exam  Will appreciate nutritionist input    Elevated troponin  Assessment & Plan  Troponin peak of 50 with a subsequent downtrend  Clinicall likely a non-MI troponin elevation in the setting of above issues    GERD (gastroesophageal reflux disease)  Assessment & Plan  Continue Pepcid    Obstructive sleep apnea  Assessment & Plan  BIPAP QHS    History of bladder cancer  Assessment & Plan  Status post TURBT and mitomycin instillation  Outpatient follow-up    Hyperlipidemia  Assessment & Plan  Continue statin      DVT Prophylaxis:  Heparin SC    Code Status:  Full code (verified multiple times with patient)    Discussion with:  Patient at bedside    Anticipated Length of Stay:  Patient will be admitted on an Inpatient basis with an anticipated length of stay of greater than 2 midnights. Justification for Hospital Stay: Acute on chronic respiratory failure with COPD exacerbation and underlying systolic CHF, in the setting of a broken BiPAP device at home. Total Time for Visit, including Counseling / Coordination of Care: 75 minutes. More than 50% of total time spent on counseling and coordination of care, on one or more of the following: performing physical exam; counseling and coordination of care; obtaining or reviewing history; documenting in the medical record; reviewing/ordering tests, medications or procedures; communicating with other healthcare professionals and discussing with patient's family/caregivers. Chief Complaint:  Shortness of breath and broken BiPAP      History of Present Illness:    Hans Doherty is a 79 y.o. male who presents with complaints of progressive worsening shortness of breath and weakness over the last few days. He states that his BiPAP machine "broke" at home.   He also states he tried to get hold of his PCP who apparently told him to call the machine  to get it fixed. In the ED, he was placed on BiPAP, and over a few hours, reported improvement in his shortness of breath and wheezing. Denies fever/chills at this time. Denies significant coughing at this time. Other than above-mentioned symptoms, denies other complaints currently. Review of Systems:    Review of Systems - A thorough 12 point review systems was conducted. Pertinent positives and negatives are mentioned in the history of present illness. Past Medical and Surgical History:     Past Medical History:   Diagnosis Date   • Acute metabolic encephalopathy 03/07/0541   • Arthritis    • Bladder mass    • Cardiomyopathy [facility])    • Chest pain    • COPD (chronic obstructive pulmonary disease) ([facility])    • COVID-19 virus infection 02/23/2023   • CPAP (continuous positive airway pressure) dependence    • Emphysema of lung (HCC)    • Hypoxia     nocturnal   • Left bundle branch block    • Multiple pulmonary nodules     last assessed: 10/12/16   • Pneumonia    • Sleep apnea, obstructive    • Smoker    • Weight loss 08/29/2019       Past Surgical History:   Procedure Laterality Date   • COLONOSCOPY     • CYSTOSCOPY     • CYSTOSCOPY  02/17/2021   • AR BLADDER INSTILLATION ANTICARCINOGENIC AGENT N/A 1/3/2020    Procedure: INSTILLATION MITOMYCIN;  Surgeon: Eliza Story MD;  Location: [facility];  Service: Urology   • AR CYSTO W/REMOVAL OF LESIONS SMALL N/A 1/3/2020    Procedure: TRANSURETHRAL RESECTION OF BLADDER TUMOR (TURBT);   Surgeon: Eliza Story MD;  Location[facility];  Service: Urology   • AR CYSTOURETHROSCOPY WITH BIOPSY N/A 4/13/2021    Procedure: Jimbo Bravo;  Surgeon: Eliza Story MD;  [facility];  Service: Urology   • AR TRURL ELECTROSURG RESCJ PROSTATE BLEED COMPLETE N/A 4/13/2021    Procedure: TRANSURETHRAL RESECTION OF PROSTATE (TURP) BLADDER BIOPSY, FULGURATION;  Surgeon: Eliza Story MD;  [facility];  Service: Urology         Medications & Allergies:    Prior to Admission medications    Medication Sig Start Date End Date Taking?  Authorizing Provider   acetaminophen (TYLENOL) 325 mg tablet Take 3 tablets (975 mg total) by mouth every 8 (eight) hours as needed for mild pain or moderate pain 2/28/23   Lexie Spencer DO   albuterol (PROVENTIL HFA,VENTOLIN HFA) 90 mcg/act inhaler Inhale 2 puffs every 6 (six) hours as needed for wheezing 6/12/23   DELIA Romero   aspirin 81 mg chewable tablet Chew 1 tablet (81 mg total) daily 4/1/22 6/26/23  Von Benitez MD   azithromycin (ZITHROMAX) 500 MG tablet Take 1 tablet (500 mg total) by mouth every 24 hours for 18 doses 7/29/23 8/16/23  Jd Vitale MD   budesonide (PULMICORT) 0.5 mg/2 mL nebulizer solution TAKE 2 ML (0.5 MG TOTAL) BY NEBULIZATION TWICE A DAY RINSE MOUTH AFTER USE 5/10/23   Kiya Richter MD   cyanocobalamin (VITAMIN B-12) 1000 MCG tablet Take 1 tablet (1,000 mcg total) by mouth daily 5/10/23   Lexie Spencer DO   Diclofenac Sodium (VOLTAREN) 1 % APPLY 2 GRAMS 4 TIMES A DAY 3/13/23   Historical Provider, MD   docusate sodium (COLACE) 100 mg capsule Take 1 capsule (100 mg total) by mouth 2 (two) times a day 7/21/23   Nilson Min MD   famotidine (PEPCID) 20 mg tablet Take 1 tablet (20 mg total) by mouth 2 (two) times a day for 14 days 7/21/23 8/4/23  Nilson Min MD   formoterol (PERFOROMIST) 20 MCG/2ML nebulizer solution TAKE 2 ML (20 MCG TOTAL) BY NEBULIZATION 2 (TWO) TIMES A DAY 4/17/23   Historical Provider, MD   ipratropium-albuterol (DUO-NEB) 0.5-2.5 mg/3 mL nebulizer solution Take 3 mL by nebulization 4 (four) times a day 7/14/23   Historical Provider, MD   Melatonin 5 MG TABS Take 1 tablet by mouth daily at bedtime 3/13/23   Historical Provider, MD   potassium chloride (K-DUR,KLOR-CON) 20 mEq tablet Take 1 tablet (20 mEq total) by mouth 2 (two) times a day 7/21/23   Nilson Min MD   predniSONE 10 mg tablet Take 4 tablets (40 mg total) by mouth daily for 1 day, THEN 3 tablets (30 mg total) daily for 3 days, THEN 2 tablets (20 mg total) daily for 3 days, THEN 1 tablet (10 mg total) daily for 3 days. 7/31/23 8/10/23  Magaly Membreno MD   predniSONE 5 mg tablet Take 1 tablet (5 mg total) by mouth daily Do not start before August 8, 2023. 8/8/23   Magaly Membreno MD   rosuvastatin (CRESTOR) 10 MG tablet Take 1 tablet (10 mg total) by mouth daily 3/27/23   DELIA Wilkinson   senna (SENOKOT) 8.6 mg Take 1 tablet (8.6 mg total) by mouth daily at bedtime 7/21/23   Aaron Poole MD   sodium chloride (OCEAN) 0.65 % nasal spray 1 spray into each nostril every hour as needed for congestion 6/12/23   DELIA Vicente   tamsulosin [facility]) 0.4 mg Take 1 capsule (0.4 mg total) by mouth daily with dinner 2/28/23   Sony Spencer DO   torsemide (DEMADEX) 10 mg tablet Take 4 tablets (40 mg total) by mouth daily 7/29/23   Magaly Membreno MD   budesonide-formoterol (Symbicort) 160-4.5 mcg/act inhaler Inhale 2 puffs 2 (two) times a day Rinse mouth after use. 8/29/22 8/29/22  Amando Stout DO   mometasone-formoterol [facility]) 200-5 MCG/ACT inhaler Inhale 2 puffs 2 (two) times a day Rinse mouth after use. 8/29/22 8/29/22  Amando Stout DO   predniSONE 10 mg tablet Take 4 tablets (40 mg total) by mouth daily for 1 day, THEN 3 tablets (30 mg total) daily for 3 days, THEN 2 tablets (20 mg total) daily for 3 days, THEN 1 tablet (10 mg total) daily for 3 days.  Do not start before July 30, 2023. 7/30/23 7/31/23  Magaly Membreno MD         Allergies: No Known Allergies      Social History:    Substance Use History:   Social History     Substance and Sexual Activity   Alcohol Use Not Currently    Comment: rarely     Social History     Tobacco Use   Smoking Status Former   • Packs/day: 2.50   • Years: 42.00   • Total pack years: 105.00   • Types: Cigarettes   • Start date: 5   • Quit date: 2012   • Years since quittin.5   Smokeless Tobacco Former   Tobacco Comments    1 ppd for 37 years, 2010 down to 5 cigs a day, is around second hand smoke     Social History     Substance and Sexual Activity   Drug Use No         Family History:    Per medical chart, significant diabetes mellitus in mother.       Physical Exam:     Vitals:   Blood Pressure: (!) 85/56 (23 1700)  Pulse: 96 (23 1700)  Temperature: 99.1 °F (37.3 °C) (23 1700)  Temp Source: Axillary (23 170)  Respirations: (!) 30 (23 1407)  SpO2: 93 % (23 170)      GENERAL  frail and cachectic - weak/fatigued   HEAD   Normocephalic - atraumatic - temporal wasting present   EYES  nonicteric   MOUTH   Mucosa moist   NECK   Supple - full range of motion - clavicular wasting present   CARDIAC  tachycardic - S1/S2 positive   PULMONARY  diminished bilateral breath sounds with expiratory wheezes - nonlabored respirations on BiPAP   ABDOMEN   Soft - nontender/nondistended - active bowel sounds   MUSCULOSKELETAL   Motor strength/range of motion deconditioned   NEUROLOGIC  at baseline   SKIN   Chronic wrinkles/blemishes    PSYCHIATRIC   Mood/affect stable         Additional Data:     Labs & Recent Cultures:    Results from last 7 days   Lab Units 23  1121   WBC Thousand/uL 20.09*   HEMOGLOBIN g/dL 12.8   HEMATOCRIT % 40.2   PLATELETS Thousands/uL 239   NEUTROS PCT % 93*   LYMPHS PCT % 1*   MONOS PCT % 5   EOS PCT % 0     Results from last 7 days   Lab Units 23  1121   SODIUM mmol/L 129*   POTASSIUM mmol/L 4.2   CHLORIDE mmol/L 81*   CO2 mmol/L 44*   BUN mg/dL 32*   CREATININE mg/dL 0.66   ANION GAP mmol/L 4   CALCIUM mg/dL 9.8   GLUCOSE RANDOM mg/dL 151*         Results from last 7 days   Lab Units 23  1103 23  0645 230 23  1537 23  1202 23  0643 23  1548 23  1123 23  0620 23  0601 23  2045   POC GLUCOSE mg/dl 163* 151* 314* 314* 236* 172* 152* 276* 270* 188* 219* 180*                         Lines/Drains:  Invasive Devices     Peripheral Intravenous Line  Duration           Peripheral IV 07/31/23 Right Antecubital <1 day                  Imaging:     CTA ED chest PE Study    Result Date: 7/31/2023  Narrative: CTA - CHEST WITH IV CONTRAST - PULMONARY ANGIOGRAM INDICATION:   worsening sob. elevated ddimer. Per my review of the medical record, history of severe emphysema. White count of 20,000. COMPARISON: CXR 7/31/2023, chest CT 12/21/2022. TECHNIQUE: CT angiogram timed for optimal opacification of the pulmonary arteries. Axial, sagittal, and coronal 2D reformats created from source data. Coronal 3D MIP postprocessing on the acquisition scanner. Radiation dose length product (DLP):  282.81 mGy-cm . Radiation dose exposure minimized using iterative reconstruction and automated exposure control. IV Contrast:  85 mL of iohexol (OMNIPAQUE) FINDINGS: PULMONARY ARTERIES:  No pulmonary embolus. LUNGS: Moderate patchy multifocal bilateral groundglass opacity and consolidation, new from December 2022. New 6 mm anterior paramediastinal right upper lobe nodule (3/55). Moderate emphysema. AIRWAYS: No significant filling defects. Mild diffuse bronchial wall thickening PLEURA:  Unremarkable. HEART/GREAT VESSELS: Normal heart size. Mild coronary artery calcification indicating atherosclerotic heart disease. MEDIASTINUM AND JES:  Unremarkable. CHEST WALL AND LOWER NECK: Unremarkable. UPPER ABDOMEN:  Unremarkable. OSSEOUS STRUCTURES: Moderate degenerative disease in the spine with moderate curvature. Several stable compression deformities. Impression: No pulmonary embolus. Moderate patchy bilateral groundglass opacity and consolidation, new from December 2022, infectious/inflammatory. New 6 mm right upper lobe nodule. Per 2017 Fleischner Society guidelines, recommend follow-up with a chest CT in 6 months. Moderate emphysema.  This study was marked in Epic for immediate notification and follow-up. Workstation performed: [id number]                   ** Please Note: This note is constructed using a voice recognition dictation system. An occasional wrong word/phrase or “sound-a-like” substitution may have been picked up by dictation device due to the inherent limitations of voice recognition software. Read the chart carefully and recognize, using reasonable context, where substitutions may have occurred. **

## 2023-07-31 NOTE — ASSESSMENT & PLAN NOTE
BMI of 17.67 in the setting of chronic illness coupled with decreased appetite/oral intake and evidence of muscle wasting/atrophy and exam  Will appreciate nutritionist input

## 2023-07-31 NOTE — UTILIZATION REVIEW
NOTIFICATION OF ADMISSION DISCHARGE   This is a Notification of Discharge from [facility]. Please be advised that this patient has been discharge from our facility. Below you will find the admission and discharge date and time including the patient’s disposition. UTILIZATION REVIEW CONTACT:  [place]  Utilization   Network Utilization Review Department  Phone: [phone] x carefully listen to the prompts. All voicemails are confidential.  Email: [email or web address]     ADMISSION INFORMATION  PRESENTATION DATE: 7/22/2023  2:46 PM  OBERVATION ADMISSION DATE:   INPATIENT ADMISSION DATE: 7/22/23  5:47 PM   DISCHARGE DATE: 7/29/2023  3:44 PM   DISPOSITION:Home with Home Health Care    IMPORTANT INFORMATION:  Send all requests for admission clinical reviews, approved or denied determinations and any other requests to dedicated fax number below belonging to the campus where the patient is receiving treatment.  List of dedicated fax numbers:  [place] DENIALS (Administrative/Medical Necessity) [phone] [address] [facility] (Maternity/NICU/Pediatrics) [phone]   [facility] [phone]   [facility] [phone] [address] [facility] [phone] [address] [facility] [phone]   [facility] [phone]   [address] [place], [place] [address] [address] [facility] [phone]   [address] [facility] [phone] 3[address] [facility] [phone]   [address] [facility] [address] [facility] [phone]

## 2023-08-01 ENCOUNTER — HOME CARE VISIT (OUTPATIENT)
Dept: HOME HEALTH SERVICES | Facility: HOME HEALTHCARE | Age: 68
End: 2023-08-01
Payer: COMMERCIAL

## 2023-08-01 PROBLEM — R91.1 LUNG NODULE: Status: ACTIVE | Noted: 2023-08-01

## 2023-08-01 PROBLEM — T38.0X5A STEROID-INDUCED HYPERGLYCEMIA: Status: ACTIVE | Noted: 2023-03-30

## 2023-08-01 LAB
ANION GAP SERPL CALCULATED.3IONS-SCNC: 3 MMOL/L
ANISOCYTOSIS BLD QL SMEAR: PRESENT
BASOPHILS # BLD MANUAL: 0 THOUSAND/UL (ref 0–0.1)
BASOPHILS NFR MAR MANUAL: 0 % (ref 0–1)
BUN SERPL-MCNC: 38 MG/DL (ref 5–25)
CALCIUM SERPL-MCNC: 9.6 MG/DL (ref 8.3–10.1)
CHLORIDE SERPL-SCNC: 85 MMOL/L (ref 96–108)
CO2 SERPL-SCNC: 45 MMOL/L (ref 21–32)
CREAT SERPL-MCNC: 0.77 MG/DL (ref 0.6–1.3)
DME PARACHUTE DELIVERY DATE ACTUAL: NORMAL
DME PARACHUTE DELIVERY DATE REQUESTED: NORMAL
DME PARACHUTE ITEM DESCRIPTION: NORMAL
DME PARACHUTE ORDER STATUS: NORMAL
DME PARACHUTE SUPPLIER NAME: NORMAL
DME PARACHUTE SUPPLIER PHONE: NORMAL
EOSINOPHIL # BLD MANUAL: 0 THOUSAND/UL (ref 0–0.4)
EOSINOPHIL NFR BLD MANUAL: 0 % (ref 0–6)
ERYTHROCYTE [DISTWIDTH] IN BLOOD BY AUTOMATED COUNT: 12.7 % (ref 11.6–15.1)
GFR SERPL CREATININE-BSD FRML MDRD: 94 ML/MIN/1.73SQ M
GLUCOSE SERPL-MCNC: 188 MG/DL (ref 65–140)
GLUCOSE SERPL-MCNC: 298 MG/DL (ref 65–140)
GLUCOSE SERPL-MCNC: 338 MG/DL (ref 65–140)
HCT VFR BLD AUTO: 30 % (ref 36.5–49.3)
HGB BLD-MCNC: 9.3 G/DL (ref 12–17)
LYMPHOCYTES # BLD AUTO: 0.12 THOUSAND/UL (ref 0.6–4.47)
LYMPHOCYTES # BLD AUTO: 1 % (ref 14–44)
MAGNESIUM SERPL-MCNC: 2.5 MG/DL (ref 1.6–2.6)
MCH RBC QN AUTO: 31.1 PG (ref 26.8–34.3)
MCHC RBC AUTO-ENTMCNC: 31 G/DL (ref 31.4–37.4)
MCV RBC AUTO: 100 FL (ref 82–98)
MONOCYTES # BLD AUTO: 0.12 THOUSAND/UL (ref 0–1.22)
MONOCYTES NFR BLD: 1 % (ref 4–12)
NEUTROPHILS # BLD MANUAL: 12.24 THOUSAND/UL (ref 1.85–7.62)
NEUTS SEG NFR BLD AUTO: 98 % (ref 43–75)
NRBC BLD AUTO-RTO: 1 /100 WBC (ref 0–2)
OVALOCYTES BLD QL SMEAR: PRESENT
PHOSPHATE SERPL-MCNC: 4.1 MG/DL (ref 2.3–4.1)
PLATELET # BLD AUTO: 212 THOUSANDS/UL (ref 149–390)
PLATELET BLD QL SMEAR: ADEQUATE
PMV BLD AUTO: 10.7 FL (ref 8.9–12.7)
POIKILOCYTOSIS BLD QL SMEAR: PRESENT
POTASSIUM SERPL-SCNC: 4.4 MMOL/L (ref 3.5–5.3)
RBC # BLD AUTO: 2.99 MILLION/UL (ref 3.88–5.62)
RBC MORPH BLD: PRESENT
SODIUM SERPL-SCNC: 133 MMOL/L (ref 135–147)
WBC # BLD AUTO: 12.49 THOUSAND/UL (ref 4.31–10.16)

## 2023-08-01 PROCEDURE — 94664 DEMO&/EVAL PT USE INHALER: CPT

## 2023-08-01 PROCEDURE — NC001 PR NO CHARGE: Performed by: INTERNAL MEDICINE

## 2023-08-01 PROCEDURE — 97167 OT EVAL HIGH COMPLEX 60 MIN: CPT

## 2023-08-01 PROCEDURE — 99232 SBSQ HOSP IP/OBS MODERATE 35: CPT | Performed by: NURSE PRACTITIONER

## 2023-08-01 PROCEDURE — 82948 REAGENT STRIP/BLOOD GLUCOSE: CPT

## 2023-08-01 PROCEDURE — 94003 VENT MGMT INPAT SUBQ DAY: CPT

## 2023-08-01 PROCEDURE — 85027 COMPLETE CBC AUTOMATED: CPT | Performed by: INTERNAL MEDICINE

## 2023-08-01 PROCEDURE — 84100 ASSAY OF PHOSPHORUS: CPT | Performed by: INTERNAL MEDICINE

## 2023-08-01 PROCEDURE — 85007 BL SMEAR W/DIFF WBC COUNT: CPT | Performed by: INTERNAL MEDICINE

## 2023-08-01 PROCEDURE — 99223 1ST HOSP IP/OBS HIGH 75: CPT | Performed by: INTERNAL MEDICINE

## 2023-08-01 PROCEDURE — 83735 ASSAY OF MAGNESIUM: CPT | Performed by: INTERNAL MEDICINE

## 2023-08-01 PROCEDURE — 97163 PT EVAL HIGH COMPLEX 45 MIN: CPT

## 2023-08-01 PROCEDURE — 80048 BASIC METABOLIC PNL TOTAL CA: CPT | Performed by: INTERNAL MEDICINE

## 2023-08-01 PROCEDURE — 94760 N-INVAS EAR/PLS OXIMETRY 1: CPT

## 2023-08-01 PROCEDURE — 94640 AIRWAY INHALATION TREATMENT: CPT

## 2023-08-01 RX ORDER — INSULIN LISPRO 100 [IU]/ML
1-5 INJECTION, SOLUTION INTRAVENOUS; SUBCUTANEOUS
Status: DISCONTINUED | OUTPATIENT
Start: 2023-08-01 | End: 2023-08-03 | Stop reason: HOSPADM

## 2023-08-01 RX ORDER — AZITHROMYCIN 250 MG/1
250 TABLET, FILM COATED ORAL
Status: DISCONTINUED | OUTPATIENT
Start: 2023-08-01 | End: 2023-08-03

## 2023-08-01 RX ADMIN — BUDESONIDE 0.5 MG: 0.5 INHALANT ORAL at 20:12

## 2023-08-01 RX ADMIN — INSULIN LISPRO 2 UNITS: 100 INJECTION, SOLUTION INTRAVENOUS; SUBCUTANEOUS at 22:13

## 2023-08-01 RX ADMIN — LEVALBUTEROL HYDROCHLORIDE 1.25 MG: 1.25 SOLUTION RESPIRATORY (INHALATION) at 20:12

## 2023-08-01 RX ADMIN — HEPARIN SODIUM 5000 UNITS: 5000 INJECTION INTRAVENOUS; SUBCUTANEOUS at 22:12

## 2023-08-01 RX ADMIN — FORMOTEROL FUMARATE DIHYDRATE 20 MCG: 20 SOLUTION RESPIRATORY (INHALATION) at 07:49

## 2023-08-01 RX ADMIN — INSULIN LISPRO 3 UNITS: 100 INJECTION, SOLUTION INTRAVENOUS; SUBCUTANEOUS at 18:25

## 2023-08-01 RX ADMIN — IPRATROPIUM BROMIDE 0.5 MG: 0.5 SOLUTION RESPIRATORY (INHALATION) at 07:49

## 2023-08-01 RX ADMIN — TAMSULOSIN HYDROCHLORIDE 0.4 MG: 0.4 CAPSULE ORAL at 18:26

## 2023-08-01 RX ADMIN — HEPARIN SODIUM 5000 UNITS: 5000 INJECTION INTRAVENOUS; SUBCUTANEOUS at 09:39

## 2023-08-01 RX ADMIN — FAMOTIDINE 20 MG: 20 TABLET, FILM COATED ORAL at 09:39

## 2023-08-01 RX ADMIN — BUDESONIDE 0.5 MG: 0.5 INHALANT ORAL at 07:49

## 2023-08-01 RX ADMIN — CYANOCOBALAMIN TAB 500 MCG 1000 MCG: 500 TAB at 09:39

## 2023-08-01 RX ADMIN — POTASSIUM CHLORIDE 20 MEQ: 1500 TABLET, EXTENDED RELEASE ORAL at 09:39

## 2023-08-01 RX ADMIN — FAMOTIDINE 20 MG: 20 TABLET, FILM COATED ORAL at 18:25

## 2023-08-01 RX ADMIN — DOCUSATE SODIUM 100 MG: 100 CAPSULE ORAL at 09:39

## 2023-08-01 RX ADMIN — TORSEMIDE 40 MG: 20 TABLET ORAL at 09:39

## 2023-08-01 RX ADMIN — LEVALBUTEROL HYDROCHLORIDE 1.25 MG: 1.25 SOLUTION RESPIRATORY (INHALATION) at 02:00

## 2023-08-01 RX ADMIN — METHYLPREDNISOLONE SODIUM SUCCINATE 40 MG: 40 INJECTION, POWDER, FOR SOLUTION INTRAMUSCULAR; INTRAVENOUS at 09:39

## 2023-08-01 RX ADMIN — IPRATROPIUM BROMIDE 0.5 MG: 0.5 SOLUTION RESPIRATORY (INHALATION) at 02:00

## 2023-08-01 RX ADMIN — ATORVASTATIN CALCIUM 20 MG: 20 TABLET, FILM COATED ORAL at 18:26

## 2023-08-01 RX ADMIN — METHYLPREDNISOLONE SODIUM SUCCINATE 40 MG: 40 INJECTION, POWDER, FOR SOLUTION INTRAMUSCULAR; INTRAVENOUS at 22:12

## 2023-08-01 RX ADMIN — FORMOTEROL FUMARATE DIHYDRATE 20 MCG: 20 SOLUTION RESPIRATORY (INHALATION) at 20:12

## 2023-08-01 RX ADMIN — DOCUSATE SODIUM 100 MG: 100 CAPSULE ORAL at 18:25

## 2023-08-01 RX ADMIN — POTASSIUM CHLORIDE 20 MEQ: 1500 TABLET, EXTENDED RELEASE ORAL at 18:25

## 2023-08-01 RX ADMIN — LEVALBUTEROL HYDROCHLORIDE 1.25 MG: 1.25 SOLUTION RESPIRATORY (INHALATION) at 07:49

## 2023-08-01 RX ADMIN — AZITHROMYCIN DIHYDRATE 250 MG: 250 TABLET, FILM COATED ORAL at 18:25

## 2023-08-01 RX ADMIN — IPRATROPIUM BROMIDE 0.5 MG: 0.5 SOLUTION RESPIRATORY (INHALATION) at 20:12

## 2023-08-01 NOTE — ASSESSMENT & PLAN NOTE
Patient with end-stage COPD. Has been on prednisone tapering course outpatient from recent hospitalization/discharge. · IV Solu-Medrol for now  · Continue Pulmicort/Perforomist  · Respiratory protocol  · Pulmonology to follow, appreciate input. · Goals of care have been discussed in the past given end-stage COPD with recurrent hospitalization and frequent exacerbations, most recently during hospitalization end of July however patient declines hospice.

## 2023-08-01 NOTE — ASSESSMENT & PLAN NOTE
Recent A1c 5.7. Mild hyperglycemia noted, likely steroid-induced.   · Initiate sliding scale insulin

## 2023-08-01 NOTE — CASE MANAGEMENT
Case Management Assessment    Patient name Sandra Villalta.   Location [facility] MRN [id number]  : 1955 Date 2023       Current Admission Date: 2023  Current Admission Diagnosis:Acute on chronic respiratory failure with hypoxia and hypercapnia    Patient Active Problem List    Diagnosis Date Noted   • Lung nodule 2023   • Elevated troponin 2023   • History of bladder cancer 2023   • Pulmonary cachexia due to COPD [facility])    • Chronic hypercapnia/KEVIN    • Metabolic encephalopathy    • End-stage COPD with acute exacerbation ([address]) 2023   • Palliative care patient 2023   • Alkalosis 2023   • Severe protein-calorie malnutrition  06/10/2023   • Pneumonia 2023   • Hyperlipidemia 2023   • COPD exacerbation ([address]) 2023   • Steroid-induced hyperglycemia 2023   • Physical deconditioning 2023   • Chronic anemia 2023   • SIRS (systemic inflammatory response syndrome) 2023   • Hyponatremia 2023   • Chronic HFrEF (heart failure with reduced ejection fraction)  2022   • Protein-calorie malnutrition ([address]) 2022   • Acute on chronic respiratory failure with hypoxia and hypercapnia  2022   • Pulmonary nodules/lesions, multiple 2022   • Prostate cancer ([address]) 2021   • BPH with obstruction/lower urinary tract symptoms 2021   • Goals of care, counseling/discussion 2021   • Chronic respiratory failure with hypoxia and hypercapnia ([address]) 2020   • Macrocytosis without anemia 2019   • Other insomnia 2019   • GERD (gastroesophageal reflux disease) 2017   • Nonobstructive atherosclerosis of coronary artery 2016   • Mood disorder ([address]) 2015   • Prediabetes 2015   • Osteoporosis 10/14/2014   • Vitamin D deficiency 10/14/2014   • Nonischemic cardiomyopathy ([address]) 2014   • Left bundle-branch block 2013   • Osteoarthritis 08/03/2013   • Obstructive sleep apnea 07/29/2013   • COPD exacerbation 07/18/2012      LOS (days): 1  Geometric Mean LOS (GMLOS) (days):   Days to GMLOS:     OBJECTIVE:  PATIENT READMITTED [address] of Unplanned Readmission Score: 51.36         Current admission status: Inpatient       Preferred Pharmacy:   [facility] [place], [place]  [address] [place] [address]  Phone: [phone] Fax: [phone]    Primary Care Provider: Guy Bailey DO    Primary Insurance: BLUE CROSS  Secondary Insurance: MEDICARE    ASSESSMENT:  [facility], [facility] Representative - Spouse   Primary Phone: [phone] (Mobile)  Home Phone: [phone]                         Readmission Root Cause  30 Day Readmission: Yes  Who directed you to return to the hospital?: Self  During previous admission, was a post-acute recommendation made?: Yes  What post-acute resources were offered?: [address]  Patient was readmitted due to: Acute on chronic respiratory failure with hypoxia and hypercapnia    Patient Information  Admitted from[de-identified] Home  Mental Status: Alert  During Assessment patient was accompanied by: Not accompanied during assessment  Assessment information provided by[de-identified] Patient  Primary Caregiver: Spouse  Caregiver's Name[de-identified] Natasha Ortiz (spouse)  Caregiver's Relationship to Patient[de-identified] Family Member  Support Systems: Spouse/significant other, Son  Home entry access options.  Select all that apply.: Stairs  Number of steps to enter home.: 3  Type of [place] Residence: [place]  Homeless/housing insecurity resource given?: N/A  Living Arrangements: Lives w/ Spouse/significant other    Activities of Daily Living Prior to Admission  Functional Status: Assistance  Completes ADLs independently?: No  Level of ADL dependence: Assistance  Does patient use assisted devices?: Yes  Assisted Devices (DME) used: [employer] [place] Bed, Home Oxygen concentrator, CPAP, Wheelchair, [facility]  DME Company Name (respiratory supplies): [employer]  O2 Rate(s): 3  Does patient currently own DME?: Yes  What DME does the patient currently own?: [place] Bed, Home Oxygen concentrator, Portable Oxygen concentrator, Wheelchair, Shower Chair, CPAP  Does patient have a history of Outpatient Therapy (PT/OT)?: No  Does the patient have a history of Short-Term Rehab?: Yes  Does patient have a history of HHC?: Yes  Does patient currently have [facility]?: No         Patient Information Continued  Income Source: Pension/care home  Food insecurity resource given?: N/A  Does patient have a history of substance abuse?: No  Does patient have a history of Mental Health Diagnosis?: No         Means of Transportation  Means of Transport to [facility][de-identified] Family transport  Was application for public transport provided?: N/A

## 2023-08-01 NOTE — PROGRESS NOTES
[address] [facility]  Progress Note  Name: Favio Pretty  MRN: [id number]  Unit/Bed#: [facility] 209-01 I Date of Admission: 7/31/2023   Date of Service: 8/1/2023 I Hospital Day: 1    Assessment/Plan   * Acute on chronic respiratory failure with hypoxia and hypercapnia   Assessment & Plan  Recently discharged 7/29 for COPD with acute exacerbation. At home, per patient, BiPAP machine has been "broken."  Placed on BiPAP in the ED for a few hours and patient reporting improvement in shortness of breath and wheezing. · Prior to most recent hospitalization was wearing 3L chronically. Upon discharge, he was told he would need 6 L nasal cannula at rest and 8 with ambulation/exertion at baseline as well as BiPAP nightly and as needed. His O2 company was able to provide appropriate equipment and he is able to go up to 10L if needed at home. · Currently requiring 3L at rest.   · Will repeat home O2 eval prior to d/c to clarify o2 requirements, patient/family report confusion regarding this. · Patient also with issues with BIPAP at home, wife brought in home unit for assessment  · Pulm consult pending  · CM consult  · Rest of plan as below    COPD exacerbation  Assessment & Plan  Patient with end-stage COPD. Has been on prednisone tapering course outpatient from recent hospitalization/discharge. · IV Solu-Medrol for now  · Continue Pulmicort/Perforomist  · Respiratory protocol  · Pulmonology to follow, appreciate input. · Goals of care have been discussed in the past given end-stage COPD with recurrent hospitalization and frequent exacerbations, most recently during hospitalization end of July however patient declines hospice. SIRS (systemic inflammatory response syndrome)  Assessment & Plan  SIRS versus sepsis POA as evidenced by leukocytosis, tachypnea, tachycardia.   · Likely reactive due to presenting acute/chronic issues- currently afebrile  · Chest x-ray with mild pulmonary vascular congestion, emphysematous changes. · CTA chest negative for PE. Patchy bilateral groundglass opacities and consolidation infectious versus inflammatory. · Procalcitonin is mildly elevated at 0.65, unclear significance. · Leukocytosis much improved today, will trend. · Hold off on antibiotics for now and monitor clinically    Elevated troponin  Assessment & Plan  · Troponin peak of 50 with a subsequent downtrend  · Likely non-MI troponin elevation in the setting of above issues  · No chest pain    Chronic HFrEF (heart failure with reduced ejection fraction)   Assessment & Plan  Last EF of 20% in May 2023  · Continue home dose of torsemide with potassium supplementation. · Does not appear to be on beta-blocker at baseline. · Low-sodium diet with fluid restriction  · Previously deemed by cardiology as not a candidate for advanced heart failure therapies    History of bladder cancer  Assessment & Plan  · No acute issues. · Status post TURBT and mitomycin instillation  · Outpatient follow-up    Steroid-induced hyperglycemia  Assessment & Plan  Recent A1c 5.7. Mild hyperglycemia noted, likely steroid-induced. · Initiate sliding scale insulin     Obstructive sleep apnea  Assessment & Plan  · BIPAP QHS    Hyperlipidemia  Assessment & Plan  · Continue statin    GERD (gastroesophageal reflux disease)  Assessment & Plan  · Continue Pepcid    Lung nodule  Assessment & Plan  · Noted on CTA--New 6 mm right upper lobe nodule. Per 2017 Fleischner Society guidelines, recommend follow-up with a chest CT in 6 months. Severe protein-calorie malnutrition   Assessment & Plan  BMI of 17.67 in the setting of chronic illness coupled with decreased appetite/oral intake and evidence of muscle wasting/atrophy and exam  · Nutrition follow          VTE Pharmacologic Prophylaxis: VTE Score: 2 Moderate Risk (Score 3-4) - Pharmacological DVT Prophylaxis Ordered: heparin.     Patient Centered Rounds: I performed bedside rounds with nursing staff today. Discussions with Specialists or Other Care Team Provider: nursing, case management     Education and Discussions with Family / Patient: Updated  (wife and son) at bedside. Total Time Spent on Date of Encounter in care of patient: 35 minutes This time was spent on one or more of the following: performing physical exam; counseling and coordination of care; obtaining or reviewing history; documenting in the medical record; reviewing/ordering tests, medications or procedures; communicating with other healthcare professionals and discussing with patient's family/caregivers. Current Length of Stay: 1 day(s)    Current Patient Status: Inpatient     Certification Statement: The patient will continue to require additional inpatient hospital stay due to IV steroids, pulm clearance, discharge planning regarding issues with BIPAP and O2     Discharge Plan: Anticipate discharge in 24-48 hrs to home with home services. Code Status: Level 1 - Full Code    Subjective:   Patient feels back to baseline. He is upset regarding home bipap unit and had his wife bring in his home unit so pulmonary can look at it. He also reports feeing confused over how much o2 he should be using. States that his o2 company was able to deliver appropriate equipment so that he can use up to 10L at home. Objective:     Vitals:   Temp (24hrs), Av.1 °F (36.7 °C), Min:96.8 °F (36 °C), Max:99.1 °F (37.3 °C)    Temp:  [96.8 °F (36 °C)-99.1 °F (37.3 °C)] 98.4 °F (36.9 °C)  HR:  [] 94  Resp:  [30-35] 35  BP: ()/(52-70) 103/70  SpO2:  [87 %-100 %] 95 %  There is no height or weight on file to calculate BMI. Input and Output Summary (last 24 hours): Intake/Output Summary (Last 24 hours) at 2023 1230  Last data filed at 2023 1030  Gross per 24 hour   Intake 538 ml   Output 950 ml   Net -412 ml       Physical Exam:   Physical Exam  Vitals and nursing note reviewed. Constitutional:       Appearance: He is cachectic. Interventions: Nasal cannula in place. Cardiovascular:      Rate and Rhythm: Normal rate. Pulmonary:      Breath sounds: Wheezing and rhonchi present. Comments: Mildly tachypenic   Musculoskeletal:         General: Swelling present. Skin:     General: Skin is warm. Neurological:      Mental Status: He is alert and oriented to person, place, and time. Mental status is at baseline.    Psychiatric:         Mood and Affect: Mood normal.          Additional Data:     Labs:  Results from last 7 days   Lab Units 08/01/23  0605 07/31/23  1121   WBC Thousand/uL 12.49* 20.09*   HEMOGLOBIN g/dL 9.3* 12.8   HEMATOCRIT % 30.0* 40.2   PLATELETS Thousands/uL 212 239   NEUTROS PCT %  --  93*   LYMPHS PCT %  --  1*   LYMPHO PCT % 1*  --    MONOS PCT %  --  5   MONO PCT % 1*  --    EOS PCT % 0 0     Results from last 7 days   Lab Units 08/01/23  0605   SODIUM mmol/L 133*   POTASSIUM mmol/L 4.4   CHLORIDE mmol/L 85*   CO2 mmol/L 45*   BUN mg/dL 38*   CREATININE mg/dL 0.77   ANION GAP mmol/L 3   CALCIUM mg/dL 9.6   GLUCOSE RANDOM mg/dL 188*         Results from last 7 days   Lab Units 07/29/23  1103 07/29/23  0645 07/28/23  2050 07/28/23  1537 07/28/23  1202 07/28/23  0643 07/27/23  2055 07/27/23  1548 07/27/23  1123 07/27/23  0620 07/27/23  0601 07/26/23  2045   POC GLUCOSE mg/dl 163* 151* 314* 314* 236* 172* 152* 276* 270* 188* 219* 180*         Results from last 7 days   Lab Units 07/31/23  1917   PROCALCITONIN ng/ml 0.65*       Lines/Drains:  Invasive Devices     Peripheral Intravenous Line  Duration           Peripheral IV 07/31/23 Right Antecubital 1 day          Drain  Duration           External Urinary Catheter 1 day                      Imaging: Reviewed radiology reports from this admission including: chest xray and chest CT scan    Recent Cultures (last 7 days):         Last 24 Hours Medication List:   Current Facility-Administered Medications Medication Dose Route Frequency Provider Last Rate   • acetaminophen  650 mg Oral Q6H PRN Emely Wynne MD     • atorvastatin  20 mg Oral Daily With Iván Mcmanus MD     • budesonide  0.5 mg Nebulization Q12H Shavon Sena MD     • cyanocobalamin  1,000 mcg Oral Daily Emely Wynne MD     • docusate sodium  100 mg Oral BID Emely Wynne MD     • famotidine  20 mg Oral BID Emely Wynne MD     • formoterol  20 mcg Nebulization Q12H Shavon Sena MD     • heparin (porcine)  5,000 Units Subcutaneous Q12H [address] Emely Wynne MD     • insulin lispro  1-5 Units Subcutaneous TID AC DELIA Lyn     • insulin lispro  1-5 Units Subcutaneous HS DELIA Lny     • ipratropium  0.5 mg Nebulization Q6H Emely Wynne MD     • levalbuterol  1.25 mg Nebulization Q6H Emely Wynne MD     • melatonin  6 mg Oral HS Emely Wynne MD     • methylPREDNISolone sodium succinate  40 mg Intravenous Q12H [address] Emely Wynne MD     • potassium chloride  20 mEq Oral BID Emely Wynne MD     • tamsulosin  0.4 mg Oral Daily With Iván Mcmanus MD     • torsemide  40 mg Oral Daily Emely Wynne MD          Today, Patient Was Seen By: DELIA Steinberg    **Please Note: This note may have been constructed using a voice recognition system. **

## 2023-08-01 NOTE — ASSESSMENT & PLAN NOTE
BMI of 17.67 in the setting of chronic illness coupled with decreased appetite/oral intake and evidence of muscle wasting/atrophy and exam  · Nutrition follow

## 2023-08-01 NOTE — ASSESSMENT & PLAN NOTE
Last EF of 20% in May 2023  · Continue home dose of torsemide with potassium supplementation. · Does not appear to be on beta-blocker at baseline.   · Low-sodium diet with fluid restriction  · Previously deemed by cardiology as not a candidate for advanced heart failure therapies

## 2023-08-01 NOTE — PROGRESS NOTES
[facility] STUDENT  Inpatient Progress Note for TRAINING ONLY  Not Part of Legal Medical Record     Progress Note - Ivan Cardenas. 79 y.o. male MRN: [id number]  Unit/Bed#: [facility] 209-01 Encounter: [id number]           Acute on chronic respiratory failure with hypoxia - Presents with hypoxia after difficulty with BiPAP at home. Improved in ED on BiPap CT: Moderate patchy bilateral groundglass opacity and consolidation. New 6 mm right upper lobe nodule. Per 2017 Fleischner Society guidelines, recommend follow-up with a chest CT in 6 months. Currently on 3L O2 at rest, was recently discharged to home with 6L at rest and 8L with activity. Patients O2 company provided change in equipment to facilitate this change with ability to titrate to 10. Patients family reporting confusion on O2 needs - will have PT perform O2 eval   Solumedrol 40 mg, will transition to PO steroids    Pulmonary consult     Home BiPAP unit at bedside, pulmonary to assess. COPD     Continue with home inhalers    Bipap as ordered    Respiratory protocol        Nonischemic cardiomyopathy - last EF 20%     Continue Torsemide    Low Na diet with FR - 1800cc      Obstructive sleep apnea -      BiPAP as ordered     SIRS - presented with leukocytosis, tachypnea, tachycardia in setting of acute/chronic COPD. Chest x-ray with mild pulmonary vascular congestion, emphysematous changes. Procalcitonin 0.65   Afebrile   Leukocytosis - trending down   CBC daily   Continue to monitor for s/s of infection - hold off on antibiotics for now     Elevated Troponin - peak of 50 in the setting of acute COPD exacerbation     Denies CP   Monitor    Lung nodule  Noted on CTA--New 6 mm right upper lobe nodule. recommend follow-up with a chest CT in 6 months.          VTE Pharmacologic Prophylaxis:   Pharmacologic: Heparin  Mechanical VTE Prophylaxis in Place: Yes    Patient Centered Rounds: I have performed bedside rounds with nursing staff today.    Discussions with Specialists or Other Care Team Provider: ARNP     Education and Discussions with Family / Patient: Discussion with family/patient at bedside       Time Spent for Care: 1 hour. More than 50% of total time spent on counseling and coordination of care as described above. Current Length of Stay: 1 day(s)    Current Patient Status: Inpatient   Certification Statement: The patient will continue to require additional inpatient hospital stay due to ongoing optimization of respiratory status     Discharge Plan: ***    Code Status: Level 1 - Full Code    Subjective:   "I couldn't get my BiPAP to work right"  Patient states he was feeling     Objective:     Vitals:   Temp (24hrs), Av.1 °F (36.7 °C), Min:96.8 °F (36 °C), Max:99.1 °F (37.3 °C)    Temp:  [96.8 °F (36 °C)-99.1 °F (37.3 °C)] 98.2 °F (36.8 °C)  HR:  [] 84  Resp:  [22-33] 30  BP: ()/(52-78) 103/69  SpO2:  [87 %-100 %] 100 %  There is no height or weight on file to calculate BMI. Input and Output Summary (last 24 hours): Intake/Output Summary (Last 24 hours) at 2023 0836  Last data filed at 2023 0442  Gross per 24 hour   Intake 240 ml   Output 700 ml   Net -460 ml       Physical Exam:     Physical Exam  HENT:      Mouth/Throat:      Mouth: Mucous membranes are moist.   Cardiovascular:      Rate and Rhythm: Regular rhythm. Tachycardia present. Pulses: Normal pulses. Heart sounds: Normal heart sounds. Pulmonary:      Effort: Tachypnea and accessory muscle usage present. No respiratory distress. Breath sounds: Decreased air movement present. Decreased breath sounds, wheezing and rhonchi present. No rales. Abdominal:      General: Abdomen is flat. Bowel sounds are normal.      Palpations: Abdomen is soft. Skin:     General: Skin is warm and dry. Capillary Refill: Capillary refill takes less than 2 seconds.    Neurological:      Mental Status: He is alert and oriented to person, place, and time. Historical Information   Past Medical History:   Diagnosis Date   • Acute metabolic encephalopathy    • Arthritis    • Bladder mass    • Cardiomyopathy [facility])    • Chest pain    • COPD (chronic obstructive pulmonary disease) (HCC)    • COVID-19 virus infection 2023   • CPAP (continuous positive airway pressure) dependence    • Emphysema of lung (HCC)    • Hypoxia     nocturnal   • Left bundle branch block    • Multiple pulmonary nodules     last assessed: 10/12/16   • Pneumonia    • Sleep apnea, obstructive    • Smoker    • Weight loss 2019     Past Surgical History:   Procedure Laterality Date   • COLONOSCOPY     • CYSTOSCOPY     • CYSTOSCOPY  2021   • NV BLADDER INSTILLATION ANTICARCINOGENIC AGENT N/A 1/3/2020    Procedure: INSTILLATION MITOMYCIN;  Surgeon: Bebeto Smith MD;  Location: [facility];  Service: Urology   • NV CYSTO W/REMOVAL OF LESIONS SMALL N/A 1/3/2020    Procedure: TRANSURETHRAL RESECTION OF BLADDER TUMOR (TURBT);   Surgeon: Bebeto Smith MD;  Location[facility];  Service: Urology   • NV CYSTOURETHROSCOPY WITH BIOPSY N/A 2021    Procedure: Gwendel Repress;  Surgeon: Bebeto Smith MD;  [facility]: [facility];  Service: Urology   • NV TRURL ELECTROSURG [address] PROSTATE BLEED COMPLETE N/A 2021    Procedure: TRANSURETHRAL RESECTION OF PROSTATE (TURP) BLADDER BIOPSY, FULGURATION;  Surgeon: Bebeto Smith MD;  Location: [facility];  Service: Urology     Social History   Social History     Substance and Sexual Activity   Alcohol Use Not Currently    Comment: rarely     Social History     Substance and Sexual Activity   Drug Use No     Social History     Tobacco Use   Smoking Status Former   • Packs/day: 2.50   • Years: 42.00   • Total pack years: 105.00   • Types: Cigarettes   • Start date: 5   • Quit date:    • Years since quittin.5   Smokeless Tobacco Former   Tobacco Comments    1 ppd for 37 years,  down to 5 cigs a day, is around second hand smoke     Family History: non-contributory    Meds/Allergies   all medications and allergies reviewed  No Known Allergies    Additional Data:     Labs:    Results from last 7 days   Lab Units 08/01/23  0605 07/31/23  1121   WBC Thousand/uL 12.49* 20.09*   HEMOGLOBIN g/dL 9.3* 12.8   HEMATOCRIT % 30.0* 40.2   PLATELETS Thousands/uL 212 239   NEUTROS PCT %  --  93*   LYMPHS PCT %  --  1*   MONOS PCT %  --  5   EOS PCT %  --  0     Results from last 7 days   Lab Units 08/01/23  0605   SODIUM mmol/L 133*   POTASSIUM mmol/L 4.4   CHLORIDE mmol/L 85*   CO2 mmol/L 45*   BUN mg/dL 38*   CREATININE mg/dL 0.77   ANION GAP mmol/L 3   CALCIUM mg/dL 9.6   GLUCOSE RANDOM mg/dL 188*         Results from last 7 days   Lab Units 07/29/23  1103 07/29/23  0645 07/28/23  2050 07/28/23  1537 07/28/23  1202 07/28/23  0643 07/27/23  2055 07/27/23  1548 07/27/23  1123 07/27/23  0620 07/27/23  0601 07/26/23  2045   POC GLUCOSE mg/dl 163* 151* 314* 314* 236* 172* 152* 276* 270* 188* 219* 180*         Results from last 7 days   Lab Units 07/31/23  1917   PROCALCITONIN ng/ml 0.65*         * I Have Reviewed All Lab Data Listed Above. * Additional Pertinent Lab Tests Reviewed:  [address] [place] [address] Admission Reviewed    Imaging:    Imaging Reports Reviewed Today Include: CTA, CXR   Imaging Personally Reviewed by Myself Includes:  CXR     Recent Cultures (last 7 days):           Last 24 Hours Medication List:   Current Facility-Administered Medications   Medication Dose Route Frequency Provider Last Rate   • acetaminophen  650 mg Oral Q6H PRN Colletta Sang, MD     • atorvastatin  20 mg Oral Daily With Rupesh Villarreal MD     • budesonide  0.5 mg Nebulization Q12H Jarad Oro MD     • cyanocobalamin  1,000 mcg Oral Daily Colletta Sang, MD     • docusate sodium  100 mg Oral BID Colletta Sang, MD     • famotidine  20 mg Oral BID Colletta Sang, MD     • formoterol  20 mcg Nebulization Q12H Ray Parra MD     • heparin (porcine)  5,000 Units Subcutaneous Q12H [facility] Carol Blancas MD     • ipratropium  0.5 mg Nebulization Q6H Carol Blancas MD     • levalbuterol  1.25 mg Nebulization Q6H Carol Blancas MD     • melatonin  6 mg Oral HS Carol Blancas MD     • methylPREDNISolone sodium succinate  40 mg Intravenous Q12H [facility] Carol Blancas MD     • potassium chloride  20 mEq Oral BID Carol Blancas MD     • tamsulosin  0.4 mg Oral Daily With Nichole Jones MD     • torsemide  40 mg Oral Daily Carol Blancas MD          Today, Patient Was Seen By: Beryl Malik    ** Please Note: Dictation voice to text software may have been used in the creation of this document.  **

## 2023-08-01 NOTE — ASSESSMENT & PLAN NOTE
· Troponin peak of 50 with a subsequent downtrend  · Likely non-MI troponin elevation in the setting of above issues  · No chest pain

## 2023-08-01 NOTE — DISCHARGE SUMMARY
Discharge Summary - [facility] Internal Medicine    Patient Information: Cachorro Augustin 79 y.o. male MRN: [id number]  Unit/Bed#: [facility] 525-01 Encounter: [id number]    Discharging Physician / Practitioner: Kelsey Nunes MD  PCP: Silvia Wilkerson DO  Admission Date: 7/22/2023  Discharge Date: 7/29/2023  Disposition:     Home with VNA Services (Reminder: Complete face to face encounter)    Reason for Admission: COPD exacerbation    Discharge Diagnoses:     Principal Problem:    End-stage COPD with acute exacerbation ([address])  Active Problems:    Prediabetes    Goals of care, counseling/discussion    Prostate cancer ([address])    Acute on chronic respiratory failure with hypoxia and hypercapnia     Chronic HFrEF (heart failure with reduced ejection fraction)     Hyponatremia    Hyperglycemia    Severe protein-calorie malnutrition     Chronic hypercapnia/KEVIN    Pulmonary cachexia due to COPD ([address])  Resolved Problems:    * No resolved hospital problems. *      Consultations During Hospital Stay:  · Pulmonology  · Cardiology-heart failure  · Palliative care  · Critical care    Procedures Performed:     ·     Significant Findings / Test Results:   Chest x ray-7/22-emphysema with mild pulmonary venous congestion  ·     Incidental Findings:   ·     Test Results Pending at Discharge (will require follow up):   ·      Outpatient Tests Requested:  ·     Complications:  none    Hospital Course:     Cachorro Augustin is a 79 y.o. male patient who originally presented to the hospital on 7/22/2023 due to shortness of breath. Patient has multiple admissions to the hospital recently due to COPD exacerbation. Patient is end-stage COPD on home oxygen came to the hospital with worsening shortness of breath. Patient was recently admitted to the hospital and was discharged on the day of admission he lasted less than 12 hours at home before coming to the hospital with worsening shortness of breath.   Patient was admitted to the hospital started on IV steroids. Pulmonology evaluated the patient. Required BiPAP nightly and as needed. Palliative care following the patient had ongoing goals of care requirement. At this time patient continued to want medical therapy. Patient was on high flow and was slowly weaned down to nasal cannula. Pulmonology recommended steroid taper. Patient will be discharged home with azithromycin for 3 weeks. Patient was evaluated by cardiology and managed diuretics. Chest x-ray was concerning for mild pulm vascular congestion. Patient will be discharged home on a higher dose of torsemide   Case management was contacted and patient had new oxygen concentrator and portable oxygen that can go up to 10 to 15 L as needed. Patient was feeling symptomatically well and was ready to be discharged. Patient was discharged in a stable condition on 7/29/2023. For details refer to the chart. Overall patient has very poor prognosis given his end-stage COPD and recurrent hospital stay    Condition at Discharge: good     Discharge Day Visit / Exam:     Subjective: Patient seen and examined. No events overnight. Patient reported that he is feeling well. And ready to be discharged. Family in the room  Vitals: Blood Pressure: 101/58 (07/29/23 1446)  Pulse: 82 (07/29/23 1446)  Temperature: 98.7 °F (37.1 °C) (07/29/23 1446)  Temp Source: Oral (07/29/23 1446)  Respirations: 22 (07/28/23 2213)  Height: 5' 7" (170.2 cm) (07/22/23 1908)  Weight - Scale: 43.8 kg (96 lb 9.6 oz) (07/29/23 0524)  SpO2: 100 % (07/29/23 1457)  Exam:   Physical Exam  Constitutional:       General: He is not in acute distress. Comments: Ill-appearing and cachectic male   HENT:      Head: Normocephalic and atraumatic. Nose: Nose normal.   Eyes:      General: No scleral icterus. Cardiovascular:      Rate and Rhythm: Normal rate and regular rhythm. Pulses: Normal pulses.    Pulmonary:      Effort: Pulmonary effort is normal.      Breath sounds: Normal breath sounds. Abdominal:      General: There is no distension. Palpations: There is no mass. Musculoskeletal:         General: No swelling. Normal range of motion. Neurological:      General: No focal deficit present. Mental Status: He is alert. Discussion with Family:  [place] the room    Discharge instructions/Information to patient and family:   See after visit summary for information provided to patient and family. Provisions for Follow-Up Care:  See after visit summary for information related to follow-up care and any pertinent home health orders. Planned Readmission: none     Discharge Statement:  I spent 45  minutes discharging the patient. This time was spent on the day of discharge. I had direct contact with the patient on the day of discharge. Greater than 50% of the total time was spent examining patient, answering all patient questions, arranging and discussing plan of care with patient as well as directly providing post-discharge instructions. Additional time then spent on discharge activities. Discharge Medications:  See after visit summary for reconciled discharge medications provided to patient and family.       ** Please Note: This note has been constructed using a voice recognition system **

## 2023-08-01 NOTE — CASE COMMUNICATION
Telephone communication with patient's spouse this AM who reported patient rehospitalized. He was admitted to [facility] 7/31/23.

## 2023-08-01 NOTE — CONSULTS
PULMONOLOGY CONSULT NOTE     Name: Meron Alvarenga Age & Sex: 79 y.o. male   MRN: [id number]  Unit/Bed#: [facility] 209-01   Encounter: [id number]        Reason for consultation: COPD Exacerbation    Requesting physician: Valery Nunez MD     Assessment:   1. COPD exacerbation  2. Chronic systolic heart failure  3. KEVIN w/BiPAP  4. GERD  5. History of Bladder Cancer s/p TURBT and mitomycin installation  6. Hyperlipidemia    Plan:  • Maintain oxygen saturation 88%-94%, titrate nasal cannula O2 to effect  • Trial BiPAP to assess settings that maximize comfort as it appear inability to tolerate current settings played a role in desaturation  • Continue solumedrol and transition to PO prednisone taper starting tomorrow   • Continue Atrovent, Pulmicort, and Xopenex   • Upon discharge, discussed with [facility] about providing home set-up of non-invasive ventilation to ensure proper use  • Plan to give azithromycin today and tomorrow and check EKG tomorrow to ensure there is no concern for QT prolongation, then azithromycin schedule should be Monday/wednesday/friday   • CXR final read pending, has bilateral pedal edema +1, but no JVD, continue Demadex at this time   • CTA identified bilateral ground glass opacities and new 6 mm right upper lobe nodule.  o Follow-up CT in 6 months to follow nodule  o Bilateral ground glass opacities with procalcitonin that is mildly elevated raises suspicion for pneumonia, however, his vitals are stable at this time and stable cough and sputum production, recommend monitoring at this time          History of Present Illness   HPI:  Meron Alvarenga is a 79 y.o. male who his presenting for COPD exacerbation. This is his 3rd admission for COPD exacerbation within a month. In the ED, they ruled out pulmonary embolism with CT and troponins were initially elevated but downtrended.  He was start on solumedrol and increased the number of nebulizers by adding xopenex and atrovent to home formoterol and budeosonide. He was also placed on BiPAP with improvement. He states that he is unsure of how his oxygen saturation continues to drop intermittently requiring readmission. He uses 3 L NC at home and is able to ambulate. However, he and his wife admit that he has been deconditioned, making it hard to ambulate like he used to. He did say that he was having difficulty with his BiPAP which is new, and the new setting requiring 6 L, he finds that he is short of breath on that when compared to CPAP which he utilized in the past. He states that it was malfunctioning and there hasn't been much improvement. He notes that he has a baseline cough and sputum production which has not worsened. According to his wife, he takes medications as prescribed. He denies having exposures to smoke by either smoking himself of second-hand smoke. The wife denies smoking as well. He states that he is not having fevers or chills. He is breathing well at this time with no chest pain or palpitations. He denies abdominal pain or difficulty with bowel movements or urination. He endorses having bilateral foot edema, which isn't as prominent as it is at home. He expressed that it is difficult to discuss his wishes of staying at home rather than continually going to doctor's visits and hospital stays so he can focus on his hobbies. According to prior note on 7/28, had goals of care discussion with palliative medicine where they decided he would be DNR/DNI but will continue treatment intervention. Review of systems:  12 point review of systems was completed and was otherwise negative except as listed in HPI.       Historical Information   Past Medical History:   Diagnosis Date   • Acute metabolic encephalopathy 52/05/5835   • Arthritis    • Bladder mass    • Cardiomyopathy [facility])    • Chest pain    • COPD (chronic obstructive pulmonary disease) (HCC)    • COVID-19 virus infection 02/23/2023   • CPAP (continuous positive airway pressure) dependence    • Emphysema of lung (HCC)    • Hypoxia     nocturnal   • Left bundle branch block    • Multiple pulmonary nodules     last assessed: 10/12/16   • Pneumonia    • Sleep apnea, obstructive    • Smoker    • Weight loss 2019     Past Surgical History:   Procedure Laterality Date   • COLONOSCOPY     • CYSTOSCOPY     • CYSTOSCOPY  2021   • AZ BLADDER INSTILLATION ANTICARCINOGENIC AGENT N/A 1/3/2020    Procedure: INSTILLATION MITOMYCIN;  Surgeon: Gage Holm MD;  [facility]: [facility];  Service: Urology   • AZ CYSTO W/REMOVAL OF LESIONS SMALL N/A 1/3/2020    Procedure: TRANSURETHRAL RESECTION OF BLADDER TUMOR (TURBT);   Surgeon: Gage Holm MD;  [facility];  Service: Urology   • AZ CYSTOURETHROSCOPY WITH BIOPSY N/A 2021    Procedure: Palak Whitman;  Surgeon: Gage Holm MD;  [facility];  Service: Urology   • AZ TRURL ELECTROSURG [place] COMPLETE N/A 2021    Procedure: TRANSURETHRAL RESECTION OF PROSTATE (TURP) BLADDER BIOPSY, FULGURATION;  Surgeon: Gage Holm MD;  [facility];  Service: Urology     Family History   Problem Relation Age of Onset   • Diabetes Mother        Social History    Social History:   Social History     Socioeconomic History   • Marital status: /Civil Union     Spouse name: Not on file   • Number of children: Not on file   • Years of education: Not on file   • Highest education level: Not on file   Occupational History   • Not on file   Tobacco Use   • Smoking status: Former     Packs/day: 2.50     Years: 42.00     Total pack years: 105.00     Types: Cigarettes     Start date: 5     Quit date:      Years since quittin.5   • Smokeless tobacco: Former   • Tobacco comments:     1 ppd for 37 years, 2010 down to 5 cigs a day, is around second hand smoke   Vaping Use   • Vaping Use: Never used   Substance and Sexual Activity   • Alcohol use: Not Currently     Comment: rarely   • Drug use: No   • Sexual activity: Not Currently     Partners: Female   Other Topics Concern   • Not on file   Social History Narrative    Daily coffee consumption: 8 or more cups a day        Used to work in [employer]. On disability. Social Determinants of Health     Financial Resource Strain: Not on file   Food Insecurity: No Food Insecurity (7/20/2023)    Hunger Vital Sign    • Worried About Running Out of Food in the Last Year: Never true    • Ran Out of Food in the Last Year: Never true   Transportation Needs: No Transportation Needs (7/20/2023)    PRAPARE - Transportation    • Lack of Transportation (Medical): No    • Lack of Transportation (Non-Medical):  No   Physical Activity: Not on file   Stress: Not on file   Social Connections: Not on file   Intimate Partner Violence: Not on file   Housing Stability: Low Risk  (7/20/2023)    Housing Stability Vital Sign    • Unable to Pay for Housing in the Last Year: No    • Number of Places Lived in the Last Year: 1    • Unstable Housing in the Last Year: No       Occupational History: Retired    Meds/Allergies   Current Facility-Administered Medications   Medication Dose Route Frequency   • acetaminophen (TYLENOL) tablet 650 mg  650 mg Oral Q6H PRN   • atorvastatin (LIPITOR) tablet 20 mg  20 mg Oral Daily With Dinner   • budesonide (PULMICORT) inhalation solution 0.5 mg  0.5 mg Nebulization Q12H   • cyanocobalamin (VITAMIN B-12) tablet 1,000 mcg  1,000 mcg Oral Daily   • docusate sodium (COLACE) capsule 100 mg  100 mg Oral BID   • famotidine (PEPCID) tablet 20 mg  20 mg Oral BID   • formoterol (PERFOROMIST) nebulizer solution 20 mcg  20 mcg Nebulization Q12H   • heparin (porcine) subcutaneous injection 5,000 Units  5,000 Units Subcutaneous Q12H [address]   • ipratropium (ATROVENT) 0.02 % inhalation solution 0.5 mg  0.5 mg Nebulization Q6H   • levalbuterol (XOPENEX) inhalation solution 1.25 mg  1.25 mg Nebulization Q6H   • melatonin tablet 6 mg  6 mg Oral HS   • methylPREDNISolone sodium succinate (Solu-MEDROL) injection 40 mg  40 mg Intravenous Q12H [address]   • potassium chloride (K-DUR,KLOR-CON) CR tablet 20 mEq  20 mEq Oral BID   • tamsulosin (FLOMAX) capsule 0.4 mg  0.4 mg Oral Daily With Dinner   • torsemide (DEMADEX) tablet 40 mg  40 mg Oral Daily     Medications Prior to Admission   Medication   • acetaminophen (TYLENOL) 325 mg tablet   • albuterol (PROVENTIL HFA,VENTOLIN HFA) 90 mcg/act inhaler   • aspirin 81 mg chewable tablet   • azithromycin (ZITHROMAX) 500 MG tablet   • budesonide (PULMICORT) 0.5 mg/2 mL nebulizer solution   • cyanocobalamin (VITAMIN B-12) 1000 MCG tablet   • Diclofenac Sodium (VOLTAREN) 1 %   • docusate sodium (COLACE) 100 mg capsule   • famotidine (PEPCID) 20 mg tablet   • formoterol (PERFOROMIST) 20 MCG/2ML nebulizer solution   • ipratropium-albuterol (DUO-NEB) 0.5-2.5 mg/3 mL nebulizer solution   • Melatonin 5 MG TABS   • potassium chloride (K-DUR,KLOR-CON) 20 mEq tablet   • predniSONE 10 mg tablet   • [START ON 8/8/2023] predniSONE 5 mg tablet   • rosuvastatin (CRESTOR) 10 MG tablet   • senna (SENOKOT) 8.6 mg   • sodium chloride (OCEAN) 0.65 % nasal spray   • tamsulosin (FLOMAX) 0.4 mg   • torsemide (DEMADEX) 10 mg tablet     No Known Allergies    Objective    Vitals: Blood pressure 92/60, pulse 74, temperature 97.7 °F (36.5 °C), temperature source Axillary, resp. rate (!) 30, SpO2 100 %. There is no height or weight on file to calculate BMI. Intake/Output Summary (Last 24 hours) at 8/1/2023 0801  Last data filed at 8/1/2023 0442  Gross per 24 hour   Intake 240 ml   Output 700 ml   Net -460 ml       Physical Exam  Constitutional:       General: He is awake. He is not in acute distress. Appearance: He is cachectic. He is ill-appearing. He is not toxic-appearing. HENT:      Head: Normocephalic. Mouth/Throat:      Mouth: Mucous membranes are moist.      Pharynx: Oropharynx is clear. Uvula midline.    Eyes:      General: Lids are normal.      Conjunctiva/sclera: Conjunctivae normal.   Cardiovascular:      Rate and Rhythm: Normal rate and regular rhythm. Pulses: Normal pulses. Heart sounds: S1 normal and S2 normal. Heart sounds are distant. Comments: Pitting edema of bilateral feet +1  Pulmonary:      Effort: No respiratory distress. Breath sounds: Wheezing present. Comments: Decreased breath sounds bilaterally  Bilateral wheezing  Abdominal:      General: Abdomen is flat. There is no distension. Palpations: Abdomen is soft. Tenderness: There is no abdominal tenderness. Musculoskeletal:      Right lower le+ Pitting Edema present. Left lower le+ Pitting Edema present. Skin:     General: Skin is warm and dry. Capillary Refill: Capillary refill takes less than 2 seconds. Findings: Bruising present. Neurological:      Mental Status: He is alert, oriented to person, place, and time and easily aroused. Psychiatric:         Behavior: Behavior is cooperative.          Labs:   CBC:   Lab Results   Component Value Date    WBC 12.49 (H) 2023    HGB 9.3 (L) 2023    HCT 30.0 (L) 2023     (H) 2023     2023    RBC 2.99 (L) 2023    MCH 31.1 2023    MCHC 31.0 (L) 2023    RDW 12.7 2023    MPV 10.7 2023    NRBC 0 2023   , CMP:   Lab Results   Component Value Date    SODIUM 133 (L) 2023    K 4.4 2023    CL 85 (L) 2023    CO2 45 (H) 2023    BUN 38 (H) 2023    CREATININE 0.77 2023    CALCIUM 9.6 2023    EGFR 94 2023     Laboratory and Diagnostics  Results from last 7 days   Lab Units 23  0605 23  1121 23  0759 23  0502   WBC Thousand/uL 12.49* 20.09* 6.75 8.94   HEMOGLOBIN g/dL 9.3* 12.8 9.9* 10.9*   HEMATOCRIT % 30.0* 40.2 32.0* 35.7*   PLATELETS Thousands/uL 212 239 177 182   NEUTROS PCT %  --  93*  --   --    MONOS PCT %  --  5  --   -- EOS PCT %  --  0  --   --      Results from last 7 days   Lab Units 23  0605 23  1121 23  0759 23  0502   SODIUM mmol/L 133* 129* 137 131*   POTASSIUM mmol/L 4.4 4.2 3.8 3.4*   CHLORIDE mmol/L 85* 81* 86* 78*   CO2 mmol/L 45* 44* >45* >45*   ANION GAP mmol/L 3 4  --   --    BUN mg/dL 38* 32* 32* 28*   CREATININE mg/dL 0.77 0.66 0.68 0.81   CALCIUM mg/dL 9.6 9.8 9.1 9.0   GLUCOSE RANDOM mg/dL 188* 151* 161* 237*     Results from last 7 days   Lab Units 23  0605   MAGNESIUM mg/dL 2.5   PHOSPHORUS mg/dL 4.1                               Results from last 7 days   Lab Units 23  1121   D-DIMER QUANTITATIVE ug/ml FEU 1.03*     Results from last 7 days   Lab Units 23  1917   PROCALCITONIN ng/ml 0.65*       ABG:       Micro:        Imaging and other studies: I have personally reviewed pertinent films in PACS  CTA   CXR     Pulmonary function testin/2020  Results:  FEV1/FVC Ratio: 30 %  Forced Vital Capacity: 2.00 L 59 % predicted  FEV1: 0.60 L 22 % predicted      After administration of bronchodilator   FVC: 2.43 L, 72 % predicted, 21 % change  FEV1: 0.66 L, 25 % predicted, 11 % change    Lung volumes by body plethysmography:   Total Lung Capacity 124 % predicted   Residual volume 243 % predicted    DLCO corrected for patients hemoglobin level: 45 %        EKG, Pathology, and Other Studies: I have personally reviewed pertinent reports.              Code Status: Level 1 - Full Code    VTE Pharmacologic Prophylaxis: Heparin  VTE Mechanical Prophylaxis: Foot pumps ordered      Darvin Hallr   MS-4

## 2023-08-01 NOTE — MALNUTRITION/BMI
This medical record reflects one or more clinical indicators suggestive of malnutrition. Malnutrition Findings:   Adult Malnutrition type: Chronic illness  Adult Degree of Malnutrition: Other severe protein calorie malnutrition  Malnutrition Characteristics: Fat loss, Muscle loss                  360 Statement: severe malnutrition r/t chronic medical conditions as evidenced by severe muscle loss clavicles and temples; moderate buccal fat pad loss. Treatment: oral diet and oral nutrition supplements. BMI Findings:  Adult BMI Classifications: Underweight < 18.5 - using weight from 7/29/23 and height documented 7/30/23        There is no height or weight on file to calculate BMI. See Nutrition note dated 8/1/23 for additional details. Completed nutrition assessment is viewable in the nutrition documentation. normal... Well appearing, awake, alert, oriented to person, place, time/situation and in no apparent distress.

## 2023-08-01 NOTE — UTILIZATION REVIEW
Initial Clinical Review    Admission: Date/Time/Statement:   Admission Orders (From admission, onward)     Ordered        23 Jamie  Once                      Orders Placed This Encounter   Procedures   • INPATIENT ADMISSION     Standing Status:   Standing     Number of Occurrences:   1     Order Specific Question:   Level of Care     Answer:   Level 2 Stepdown / HOT [14]     Order Specific Question:   Estimated length of stay     Answer:   More than 2 Midnights     Order Specific Question:   Certification     Answer:   I certify that inpatient services are medically necessary for this patient for a duration of greater than two midnights. See H&P and MD Progress Notes for additional information about the patient's course of treatment. ED Arrival Information     Expected   -    Arrival   2023 10:47    Acuity   Urgent            Means of arrival   Ambulance    Escorted by   [facility]    Admission type   Emergency            Arrival complaint   sob           Chief Complaint   Patient presents with   • Shortness of Breath     Patient was recently discharged for respiratory distress. Patient on 3L nasal cannula at home, reports feeling increased sob for two days. States since he has been set up with a new cpap machine, he has been having sob. Initial Presentation: 79 y.o. male presents to ed from home for evaluation and treatment of shortness of breath. Recently discharged for respiratory distress on 3L O2nc and CPAP. PMHX: COPD, CHF, PROSTATE CA. Clinical assessment significant for cachetic, hypotension 83/52 tachycardia 100-110, hypoxia 87% ra and tachypnea 26 to 33, accessory muscle usage, decreased breath sounds, wheezing. .  VB.390 / 76.6 / 42.4 / 45.4, WBC 20.09, , CO2 44, BUN 32, TROP 50, D-DIMER 1.03. Imaging shows BL ground glass opacities infectious / inflammatory.    Initially treated with albuterol x1, atrovent x2, xopenex x1.  6L O2 nc. Admit to inpatient med surg. Date: 8-1-23   Day 2: inpatient med surg  Tolerating 3L O2nc at rest.  Pulmonology consulted. Continue iv solumedrol. Patient reports difficulty with home bipap and O2 which can be increased to 10L. Wife is bring home unit into hospital. Repeat home O2 evaluation. Obtain PT/OT evaluations. Continue atrovent and levalbuterol q 6 hr.        ED Triage Vitals   07/31/23 1103 07/31/23 1051 07/31/23 1051 07/31/23 1051 07/31/23 1051   98.8 °F (37.1 °C) 102 22 108/71 100 %      Oral Monitor; Apical         No Pain          07/29/23 43.8 kg (96 lb 9.6 oz)     Additional Vital Signs:    Patient Vitals for the past 24 hrs:   BP Temp Pulse Resp SpO2   08/01/23 1140 103/70 98.4 °F (36.9 °C) 94 (!) 35 95 %   08/01/23 0800 103/69 98.2 °F (36.8 °C) 84 -- 100 %   08/01/23 0300 92/60 -- 74 -- 99 %   08/01/23 0230 -- 97.7 °F (36.5 °C) -- -- --   07/31/23 2359 106/63 (!) 96.8 °F (36 °C) 86 -- 98 %   07/31/23 2053 -- -- -- -- 92 %   07/31/23 2050 -- -- 86 -- (!) 87 %   07/31/23 1950 92/55 -- 90 -- 98 %   07/31/23 1925 (!) 83/52 98.2 °F (36.8 °C) 92 -- 99 %   07/31/23 1700 (!) 85/56 99.1 °F (37.3 °C) 96 -- 93 %   07/31/23 1654 -- -- -- -- 94 %   07/31/23 1600 106/68 -- 92 -- 98 %   07/31/23 1415 97/56 -- 100 -- 99 %   07/31/23 1407 -- -- 95 (!) 30 98 %      07/31 1230 07/31 1415 07/31 1600 07/31 1654 07/31 1925 07/31 2050 07/31 2053 08/01 0010 08/01 0201 08/01 0455   Non-Invasive Ventilation Mode    BiPAP   BiPAP BiPAP     IPAP (cm) (cm)    14   14   14   EPAP (cm) (cm)    6   6 6  6   FiO2 (%)    30   50 40  40   O2 Device BiPAP BiPAP BiPAP  Nasal c... BiPAP  BiPAP BiPAP    O2 Interface Device    Face mask   Face mask Face mask  Face mask   O2 Flow Rate (L/min) (L/min)     3            Pertinent Labs/Diagnostic Test Results:     EKG   7-31-23   Sinus tachycardia with occ PVC's   Left bundle branch block   Abnormal ECG       XR chest portable   Final  (08/01 0954)      1. Emphysematous changes with mild pulmonary vascular congestion. Patchy groundglass opacities and right upper lobe nodule identified on same day CT are not well seen on this exam. Please refer to that CT chest report of the same day for follow-up recommendations. CTA ED chest PE Study   Final  (07/31 1424)      No pulmonary embolus. Moderate patchy bilateral groundglass opacity and consolidation, new from December 2022, infectious/inflammatory. New 6 mm right upper lobe nodule. Per 2017 Fleischner Society guidelines, recommend follow-up with a chest CT in 6 months. Moderate emphysema.             Results from last 7 days   Lab Units 08/01/23  0605 07/31/23  1121 07/29/23  0759 07/26/23  0502   WBC Thousand/uL 12.49* 20.09* 6.75 8.94   HEMOGLOBIN g/dL 9.3* 12.8 9.9* 10.9*   HEMATOCRIT % 30.0* 40.2 32.0* 35.7*   PLATELETS Thousands/uL 212 239 177 182   NEUTROS ABS Thousands/µL  --  18.84*  --   --          Results from last 7 days   Lab Units 08/01/23  0605 07/31/23  1121 07/29/23 0759 07/26/23  0502   SODIUM mmol/L 133* 129* 137 131*   POTASSIUM mmol/L 4.4 4.2 3.8 3.4*   CHLORIDE mmol/L 85* 81* 86* 78*   CO2 mmol/L 45* 44* >45* >45*   ANION GAP mmol/L 3 4  --   --    BUN mg/dL 38* 32* 32* 28*   CREATININE mg/dL 0.77 0.66 0.68 0.81   EGFR ml/min/1.73sq m 94 100 99 92   CALCIUM mg/dL 9.6 9.8 9.1 9.0   MAGNESIUM mg/dL 2.5  --   --   --    PHOSPHORUS mg/dL 4.1  --   --   --          Results from last 7 days   Lab Units 07/29/23  1103 07/29/23  0645 07/28/23 2050 07/28/23  1537 07/28/23  1202 07/28/23  0643 07/27/23 2055 07/27/23  1548 07/27/23  1123 07/27/23  0620 07/27/23  0601 07/26/23  2045   POC GLUCOSE mg/dl 163* 151* 314* 314* 236* 172* 152* 276* 270* 188* 219* 180*     Results from last 7 days   Lab Units 08/01/23  0605 07/31/23  1121 07/29/23  0759 07/26/23  0502   GLUCOSE RANDOM mg/dL 188* 151* 161* 237*       Results from last 7 days   Lab Units 07/31/23  1225   PH JHONATHAN  7.390   PCO2 JHONATHAN mm Hg 76.7*   PO2 JHONATHAN mm Hg 42.4   HCO3 JHONATHAN mmol/L 45.4*   BASE EXC JHONATHAN mmol/L 17.0   O2 CONTENT JHONATHAN ml/dL 12.3   O2 HGB, VENOUS % 74.6             Results from last 7 days   Lab Units 07/31/23  1601 07/31/23  1321 07/31/23  1121   HS TNI 0HR ng/L  --   --  50*   HS TNI 2HR ng/L  --  43  --    HSTNI D2 ng/L  --  -7  --    HS TNI 4HR ng/L 41  --   --    HSTNI D4 ng/L -9  --   --      Results from last 7 days   Lab Units 07/31/23  1121   D-DIMER QUANTITATIVE ug/ml FEU 1.03*       Results from last 7 days   Lab Units 07/31/23  1917   PROCALCITONIN ng/ml 0.65*       ED Treatment:   Medication Administration from 07/31/2023 1047 to 07/31/2023 1648       Date/Time Order Dose Route Action     07/31/2023 1134 EDT albuterol inhalation solution 10 mg 10 mg Nebulization Given     07/31/2023 1134 EDT ipratropium (ATROVENT) 0.02 % inhalation solution 1 mg 1 mg Nebulization Given     07/31/2023 1134 EDT sodium chloride 0.9 % inhalation solution 12 mL 12 mL Nebulization Given     07/31/2023 1406 EDT levalbuterol (XOPENEX) inhalation solution 1.25 mg 1.25 mg Nebulization Given     07/31/2023 1406 EDT ipratropium (ATROVENT) 0.02 % inhalation solution 0.5 mg 0.5 mg Nebulization Given     Past Medical History:   Diagnosis Date   • Acute metabolic encephalopathy 15/52/9681   • Arthritis    • Bladder mass    • Cardiomyopathy [facility])    • Chest pain    • COPD (chronic obstructive pulmonary disease) ([address])    • COVID-19 virus infection 02/23/2023   • CPAP (continuous positive airway pressure) dependence    • Emphysema of lung (HCC)    • Hypoxia     nocturnal   • Left bundle branch block    • Multiple pulmonary nodules     last assessed: 10/12/16   • Pneumonia    • Sleep apnea, obstructive    • Smoker    • Weight loss 08/29/2019     Present on Admission:  • Acute on chronic respiratory failure with hypoxia and hypercapnia   • COPD exacerbation  • Chronic HFrEF (heart failure with reduced ejection fraction)   • Severe protein-calorie malnutrition       Admitting Diagnosis:     CHF (congestive heart failure) ([facility]) [I50.9]  Hypercapnia [R06.89]  Hypernatremia [E87.0]  Leukocytosis [D72.829]  SOB (shortness of breath) [R06.02]  Elevated troponin [R77.8]  COPD exacerbation ([facility]) [J44.1]  Acute respiratory failure with hypoxemia ([facility]) [J96.01]    Age/Sex: 79 y.o. male    Scheduled Medications:    atorvastatin, 20 mg, Oral, Daily With Dinner  budesonide, 0.5 mg, Nebulization, Q12H  cyanocobalamin, 1,000 mcg, Oral, Daily  docusate sodium, 100 mg, Oral, BID  famotidine, 20 mg, Oral, BID  formoterol, 20 mcg, Nebulization, Q12H  heparin (porcine), 5,000 Units, Subcutaneous, Q12H [facility]  insulin lispro, 1-5 Units, Subcutaneous, TID AC  insulin lispro, 1-5 Units, Subcutaneous, HS  ipratropium, 0.5 mg, Nebulization, Q6H  levalbuterol, 1.25 mg, Nebulization, Q6H  melatonin, 6 mg, Oral, HS  methylPREDNISolone sodium succinate, 40 mg, Intravenous, Q12H GABE  potassium chloride, 20 mEq, Oral, BID  tamsulosin, 0.4 mg, Oral, Daily With Dinner  torsemide, 40 mg, Oral, Daily      Continuous IV Infusions:     PRN Meds:  acetaminophen, 650 mg, Oral, Q6H PRN        IP CONSULT TO PULMONOLOGY  IP CONSULT TO NUTRITION SERVICES    Network Utilization Review Department  ATTENTION: Please call with any questions or concerns to [phone] and carefully listen to the prompts so that you are directed to the right person. All voicemails are confidential.  Ammy Bennett all requests for admission clinical reviews, approved or denied determinations and any other requests to dedicated fax number below belonging to the campus where the patient is receiving treatment.  List of dedicated fax numbers for the Facilities:  [place] DENIALS (Administrative/Medical Necessity) [phone] [facility] [address] [place] [address] [phone]   [facility] [phone]   [facility] [place] [address] [address] [facility] [address] [address] [address]  [phone]

## 2023-08-01 NOTE — ASSESSMENT & PLAN NOTE
· Noted on CTA--New 6 mm right upper lobe nodule. Per 2017 Fleischner Society guidelines, recommend follow-up with a chest CT in 6 months.

## 2023-08-01 NOTE — PHYSICAL THERAPY NOTE
Physical Therapy Eval    Patient Name: Andrei Ugarte. Today's Date: 8/1/2023     Problem List  Principal Problem:    Acute on chronic respiratory failure with hypoxia and hypercapnia   Active Problems:    COPD exacerbation    Obstructive sleep apnea    GERD (gastroesophageal reflux disease)    Chronic HFrEF (heart failure with reduced ejection fraction)     SIRS (systemic inflammatory response syndrome)    Steroid-induced hyperglycemia    Hyperlipidemia    Severe protein-calorie malnutrition     Elevated troponin    History of bladder cancer    Lung nodule       Past Medical History  Past Medical History:   Diagnosis Date    Acute metabolic encephalopathy 66/98/7447    Arthritis     Bladder mass     Cardiomyopathy (HCC)     Chest pain     COPD (chronic obstructive pulmonary disease) ([address])     COVID-19 virus infection 02/23/2023    CPAP (continuous positive airway pressure) dependence     Emphysema of lung (HCC)     Hypoxia     nocturnal    Left bundle branch block     Multiple pulmonary nodules     last assessed: 10/12/16    Pneumonia     Sleep apnea, obstructive     Smoker     Weight loss 08/29/2019        Past Surgical History  Past Surgical History:   Procedure Laterality Date    COLONOSCOPY      CYSTOSCOPY      CYSTOSCOPY  02/17/2021    UT BLADDER INSTILLATION ANTICARCINOGENIC AGENT N/A 1/3/2020    Procedure: INSTILLATION MITOMYCIN;  Surgeon: Santos Bell MD;  [facility]: [facility];  Service: Urology    UT CYSTO W/REMOVAL OF LESIONS SMALL N/A 1/3/2020    Procedure: TRANSURETHRAL RESECTION OF BLADDER TUMOR (TURBT);   Surgeon: Santos Bell MD;  [facility];  Service: Urology    UT CYSTOURETHROSCOPY WITH BIOPSY N/A 4/13/2021    Procedure: Leopoldo Deter;  Surgeon: Santos Bell MD;  [facility];  Service: Urology    UT TRURL ELECTROSURG [place] COMPLETE N/A 4/13/2021    Procedure: TRANSURETHRAL RESECTION OF PROSTATE (TURP) BLADDER BIOPSY, FULGURATION;  Surgeon: Jl Whitaker MD;  [facility];  Service: Urology           08/01/23 1418   PT Last Visit   PT Visit Date 08/01/23   Note Type   Note type Evaluation   Pain Assessment   Pain Assessment Tool 0-10   Pain Score No Pain   Restrictions/Precautions   Weight Bearing Precautions Per Order No   Other Precautions Multiple lines;Telemetry;O2;Fall Risk  (Pt received on BiPap and placed on 4L for therapy.)   Home Living   Type of [address] [place] [address] One level; Able to live on main level with bedroom/bathroom; Performs ADLs on one level;Stairs to enter with rails   Bathroom Shower/Tub Tub/shower unit   [employer] Grab bars in shower; Shower chair;Grab bars around toilet   [address] Walker;Cane;Wheelchair-manual  (pt reports using walker PTA)   Prior Function   Level of [place] Independent with ADLs; Independent with functional mobility; Needs assistance with IADLS   Lives With Spouse; Son   [place] Help From Family   IADLs Family/Friend/Other provides transportation; Family/Friend/Other provides meals; Family/Friend/Other provides medication management   Falls in the last 6 months 0   Vocational Unemployed   General   Family/Caregiver Present Yes  (spouse, son)   Cognition   Overall Cognitive Status WFL   Arousal/Participation Alert   Orientation Level Oriented X4   Memory Within functional limits   Following Commands Follows one step commands without difficulty   Subjective   Subjective pt pleasant and cooperative throughout therapy session. pt received supine in bed   RLE Assessment   RLE Assessment WFL   LLE Assessment   LLE Assessment WFL   Bed Mobility   Supine to Sit 5  Supervision   Additional items Increased time required;Verbal cues   Transfers   Sit to Stand 4  Minimal assistance   Additional items Assist x 1; Increased time required;Verbal cues   Stand to Sit 4  Minimal assistance   Additional items Assist x 1; Increased time required;Verbal cues   Additional Comments transfers w RW   Ambulation/Elevation   Gait pattern Forward Flexion; Wide CARLY; Decreased foot clearance;Shuffling; Short stride; Excessively slow  (significant forward flexion)   Gait Assistance 4  Minimal assist   Additional items Assist x 1;Verbal cues; Tactile cues   Assistive Device Rolling walker   Distance 50'   Stair Management Assistance Not tested   Balance   Static Sitting Fair +   Dynamic Sitting Fair +   Static Standing Fair   Dynamic Standing Fair   Ambulatory Fair -   Endurance Deficit   Endurance Deficit Yes   Endurance Deficit Description (S)  generalized weakness, decreased exercise tolerance. spO2 desat to low 90's with ambulation, O2 increased to 6L and spO2 returned to 97 after several minutes   Activity Tolerance   Activity Tolerance Patient limited by fatigue   Medical Staff Made Aware evelyn Todd due to medical complexity and multiple comorbidities   Nurse Made Aware RN cleared and updated   Assessment   Prognosis Fair   Problem List Decreased strength;Decreased endurance;Decreased range of motion; Impaired balance;Decreased mobility   Assessment PT orders received and acknowledged. Patient was seen today for high complexity PT evaluation. High complexity evaluation due to Ongoing medical management for primary dx, Decreased activity tolerance compared to baseline, Fall risk, Increased assistance needed from caregiver at current time, Ongoing telemetry monitoring, Increased O2 via NC from pts baseline, Continuous pulse oximetry monitoring  Patient is a 79 y.o. male who was [facility]23 with acute on chronic respiratory failure with hypoxia and hypercapnia . Pt presents to B with SOB after having difficulty with CPAP machine and recent discharge from hospital . Patients current diagnosis/problem list include lung nodule, elevated troponin, and hyperlipidemia . Patient performed functional mobility as described above. At baseline, pt resides with wife and son in house and was independent prior to hospital admission. Currently, upon initial examination, pt  is requiring supervision A for bed mobility skills; Stephen for functional transfers and Stephen for ambulation with RW. Patient currently presents below baseline with limitations in gait, balance, and transfers. Patient will benefit from continued PT services while in hospital in order to address remaining limitations. The patients AM-PAC Basic Mobility Inpatient Short From Raw Score is 20 . A Raw score of 20  suggests that the patient may benefit from discharge to home . PT recommendation at this time is for home with OPPT for pulmonary rehab . Please also refer to the recommendation of the Physical Therapist for safe discharge planning. Barriers to Discharge None   Goals   Patient Goals to go home   STG Expiration Date 08/15/23   Short Term Goal #1 Patient will be able to perform bed mobility tasks with modified independence in order to improve overall functional mobility and assist in safe d/c. STG 2 Patient will sit EOB for at least 25 minutes at modified independence level in order to strengthen abdominal musculature and assist in future transfers and ambulation. STG 3 Patient will be able to perform functional transfer with modified independence in order to improve overall functional mobility and assist in safe discharge. STG 4 Patient will be able to ambulate at least 300 feet with least restrictive device with modified independence assist in order to improve overall functional mobility and assist in safe discharge. STG 5 Patient will improve sitting/standing static/dynamic balance 1/2 grade in order to improve functional mobility and assist in safe discharge. STG 6 Patient will improve LE strength by 1/2 grade in order to improve functional mobility and assist in safe discharge.  STG 7 Patient will be able to negotiate at least 3 stairs with least restrictive device with modified independence A in order to improve overall functional mobility and assist in safe discharge   PT Treatment Day 0   Plan   Treatment/Interventions ADL retraining;Functional transfer training;LE strengthening/ROM; Elevations; Therapeutic exercise; Endurance training;Bed mobility;Gait training;Spoke to nursing;OT   PT Frequency 2-3x/wk   Recommendation   PT Discharge Recommendation (S)  Home with outpatient rehabilitation  (pulmonary rehab)   AM-PAC Basic Mobility Inpatient   Turning in Flat Bed Without Bedrails 4   Lying on Back to Sitting on Edge of Flat Bed Without Bedrails 4   Moving Bed to Chair 4   Standing Up From Chair Using Arms 3   Walk in Room 3   Climb 3-5 Stairs With Railing 2   Basic Mobility Inpatient Raw Score 20   Basic Mobility Standardized Score 43.99   Highest Level Of Mobility   JH-HLM Goal 6: Walk 10 steps or more   JH-HLM Achieved 7: Walk 25 feet or more   End of Consult   Patient Position at End of Consult Bedside chair;Bed/Chair alarm activated; All needs within reach       Joceline Richter PT, DPT

## 2023-08-01 NOTE — PLAN OF CARE
Problem: Nutrition/Hydration-ADULT  Goal: Nutrient/Hydration intake appropriate for improving, restoring or maintaining nutritional needs  Description: Monitor and assess patient's nutrition/hydration status for malnutrition. Collaborate with interdisciplinary team and initiate plan and interventions as ordered. Monitor patient's weight and dietary intake as ordered or per policy. Utilize nutrition screening tool and intervene as necessary. Determine patient's food preferences and provide high-protein, high-caloric foods as appropriate.      INTERVENTIONS:  - Monitor oral intake, urinary output, labs, and treatment plans  - Assess nutrition and hydration status and recommend course of action  - Evaluate amount of meals eaten  - Assist patient with eating if necessary   - Allow adequate time for meals  - Recommend/ encourage appropriate diets, oral nutritional supplements, and vitamin/mineral supplements  - Order, calculate, and assess calorie counts as needed  - Recommend, monitor, and adjust tube feedings and TPN/PPN based on assessed needs  - Assess need for intravenous fluids  - Provide specific nutrition/hydration education as appropriate  - Include patient/family/caregiver in decisions related to nutrition  Outcome: Progressing     Problem: SAFETY ADULT  Goal: Patient will remain free of falls  Description: INTERVENTIONS:  - Educate patient/family on patient safety including physical limitations  - Instruct patient to call for assistance with activity   - Consult OT/PT to assist with strengthening/mobility   - Keep Call bell within reach  - Keep bed low and locked with side rails adjusted as appropriate  - Keep care items and personal belongings within reach  - Initiate and maintain comfort rounds  - Make Fall Risk Sign visible to staff  - Offer Toileting every  Hours, in advance of need  - Initiate/Maintain alarm  - Obtain necessary fall risk management equipment:   - Apply yellow socks and bracelet for high fall risk patients  - Consider moving patient to room near nurses station  Outcome: Progressing  Goal: Maintain or return to baseline ADL function  Description: INTERVENTIONS:  -  Assess patient's ability to carry out ADLs; assess patient's baseline for ADL function and identify physical deficits which impact ability to perform ADLs (bathing, care of mouth/teeth, toileting, grooming, dressing, etc.)  - Assess/evaluate cause of self-care deficits   - Assess range of motion  - Assess patient's mobility; develop plan if impaired  - Assess patient's need for assistive devices and provide as appropriate  - Encourage maximum independence but intervene and supervise when necessary  - Involve family in performance of ADLs  - Assess for home care needs following discharge   - Consider OT consult to assist with ADL evaluation and planning for discharge  - Provide patient education as appropriate  Outcome: Progressing  Goal: Maintains/Returns to pre admission functional level  Description: INTERVENTIONS:  - Perform BMAT or MOVE assessment daily.   - Set and communicate daily mobility goal to care team and patient/family/caregiver. - Collaborate with rehabilitation services on mobility goals if consulted  - Perform Range of Motion  times a day. - Reposition patient every  hours.   - Dangle patient  times a day  - Stand patient  times a day  - Ambulate patient  times a day  - Out of bed to chair  times a day   - Out of bed for meals  times a day  - Out of bed for toileting  - Record patient progress and toleration of activity level   Outcome: Progressing     Problem: DISCHARGE PLANNING  Goal: Discharge to home or other facility with appropriate resources  Description: INTERVENTIONS:  - Identify barriers to discharge w/patient and caregiver  - Arrange for needed discharge resources and transportation as appropriate  - Identify discharge learning needs (meds, wound care, etc.)  - Arrange for interpretive services to assist at discharge as needed  - Refer to Case Management Department for coordinating discharge planning if the patient needs post-hospital services based on physician/advanced practitioner order or complex needs related to functional status, cognitive ability, or social support system  Outcome: Progressing     Problem: Knowledge Deficit  Goal: Patient/family/caregiver demonstrates understanding of disease process, treatment plan, medications, and discharge instructions  Description: Complete learning assessment and assess knowledge base. Interventions:  - Provide teaching at level of understanding  - Provide teaching via preferred learning methods  Outcome: Progressing     Problem: MOBILITY - ADULT  Goal: Maintain or return to baseline ADL function  Description: INTERVENTIONS:  -  Assess patient's ability to carry out ADLs; assess patient's baseline for ADL function and identify physical deficits which impact ability to perform ADLs (bathing, care of mouth/teeth, toileting, grooming, dressing, etc.)  - Assess/evaluate cause of self-care deficits   - Assess range of motion  - Assess patient's mobility; develop plan if impaired  - Assess patient's need for assistive devices and provide as appropriate  - Encourage maximum independence but intervene and supervise when necessary  - Involve family in performance of ADLs  - Assess for home care needs following discharge   - Consider OT consult to assist with ADL evaluation and planning for discharge  - Provide patient education as appropriate  Outcome: Progressing  Goal: Maintains/Returns to pre admission functional level  Description: INTERVENTIONS:  - Perform BMAT or MOVE assessment daily.   - Set and communicate daily mobility goal to care team and patient/family/caregiver. - Collaborate with rehabilitation services on mobility goals if consulted  - Perform Range of Motion  times a day. - Reposition patient every  hours.   - Dangle patient  times a day  - Stand patient  times a day  - Ambulate patient  times a day  - Out of bed to chair  times a day   - Out of bed for meal times a day  - Out of bed for toileting  - Record patient progress and toleration of activity level   Outcome: Progressing     Problem: Prexisting or High Potential for Compromised Skin Integrity  Goal: Skin integrity is maintained or improved  Description: INTERVENTIONS:  - Identify patients at risk for skin breakdown  - Assess and monitor skin integrity  - Assess and monitor nutrition and hydration status  - Monitor labs   - Assess for incontinence   - Turn and reposition patient  - Assist with mobility/ambulation  - Relieve pressure over bony prominences  - Avoid friction and shearing  - Provide appropriate hygiene as needed including keeping skin clean and dry  - Evaluate need for skin moisturizer/barrier cream  - Collaborate with interdisciplinary team   - Patient/family teaching  - Consider wound care consult   Outcome: Progressing

## 2023-08-01 NOTE — PLAN OF CARE
Problem: OCCUPATIONAL THERAPY ADULT  Goal: Performs self-care activities at highest level of function for planned discharge setting. See evaluation for individualized goals. Description: Treatment Interventions: ADL retraining, Functional transfer training, UE strengthening/ROM, Endurance training, Cognitive reorientation, Patient/family training, Equipment evaluation/education, Compensatory technique education, Continued evaluation, Energy conservation, Activityengagement          See flowsheet documentation for full assessment, interventions and recommendations. Note: Limitation: Decreased ADL status, Decreased UE strength, Decreased Safe judgement during ADL, Decreased cognition, Decreased endurance, Decreased self-care trans, Decreased high-level ADLs  Prognosis: Fair  Assessment: Pt is a 79 y.o. male seen for OT evaluation s/p admit to [facility] on 7/31/2023 w/ Acute on chronic respiratory failure with hypoxia and hypercapnia ([address]). Comorbidities affecting pt's functional performance at time of assessment include:  has a past medical history of Acute metabolic encephalopathy, Arthritis, Bladder mass, Cardiomyopathy ([address]), Chest pain, COPD (chronic obstructive pulmonary disease) ([address]), COVID-19 virus infection, CPAP (continuous positive airway pressure) dependence, Emphysema of lung ([address]), Hypoxia, Left bundle branch block, Multiple pulmonary nodules, Pneumonia, Sleep apnea, obstructive, Smoker, and Weight loss. . Personal factors affecting pt at time of IE include:steps to enter environment, difficulty performing ADLS, difficulty performing IADLS , limited insight into deficits, decreased initiation and engagement  and health management . Prior to admission, pt was I with ADLS and needed assist for IADLS. Upon evaluation: Pt presents supine on 3 L O2 via NC with the following vital signs: WNL.  Pt requires overall S- min A 2* the following deficits impacting occupational performance: weakness, decreased strength, decreased balance, decreased tolerance, decreased safety awareness and decreased coping skills. Pt resting in chair at end of session with all needs in reach, alarm on, all lines in place and SCD's on. Pt to benefit from continued skilled OT tx while in the hospital to address deficits as defined above and maximize level of functional independence w ADL's and functional mobility. Occupational Performance areas to address include: grooming, bathing/shower, toilet hygiene, dressing, health maintenance, functional mobility, community mobility and clothing management. The patient's raw score on the AM-PAC Daily Activity inpatient short form is 16  , standardized score is 35.96  , less than 39.4. Patients at this level are likely to benefit from discharge to post-acute rehabilitation services. However anticipate pt may progress to home with increased support and pulmonary rehab. Please refer to the recommendation of the Occupational Therapist for safe discharge planning.      OT Discharge Recommendation: Home with home health rehabilitation (vs STR pendng progress and avaialabe to support to assist with ADLS)

## 2023-08-01 NOTE — ASSESSMENT & PLAN NOTE
SIRS versus sepsis POA as evidenced by leukocytosis, tachypnea, tachycardia. · Likely reactive due to presenting acute/chronic issues- currently afebrile  · Chest x-ray with mild pulmonary vascular congestion, emphysematous changes. · CTA chest negative for PE. Patchy bilateral groundglass opacities and consolidation infectious versus inflammatory. · Procalcitonin is mildly elevated at 0.65, unclear significance. · Leukocytosis much improved today, will trend.    · Hold off on antibiotics for now and monitor clinically

## 2023-08-01 NOTE — UTILIZATION REVIEW
NOTIFICATION OF INPATIENT ADMISSION   AUTHORIZATION REQUEST   SERVICING FACILITY:   [address] [place] [address]  Address: [address] [place] [address], [address] [facility] [address] [address]  Tax ID: [id number]  NPI: [id number] ATTENDING PROVIDER:  Attending Name and NPI#: Homer Paiz Md [[id number]]  Address: [address] [place] [address]  Phone: [phone]   ADMISSION INFORMATION:  [facility] [address] [facility]   Place of Service Code: 21  Inpatient Admission Date/Time: 7/31/23  3:40 PM  Discharge Date/Time: No discharge date for patient encounter. Admitting Diagnosis Code/Description:  CHF (congestive heart failure) ([facility]) [I50.9]  Hypercapnia [R06.89]  Hypernatremia [E87.0]  Leukocytosis [D72.829]  SOB (shortness of breath) [R06.02]  Elevated troponin [R77.8]  COPD exacerbation ([facility]) [J44.1]  Acute respiratory failure with hypoxemia ([address]) [J96.01]     UTILIZATION REVIEW CONTACT:  Patience Ponce Utilization   Network Utilization Review Department  Phone: [phone]  Fax: [phone]  Email: [email or web address]  Contact for approvals/pending authorizations, clinical reviews, and discharge. PHYSICIAN ADVISORY SERVICES:  Medical Necessity Denial & Smzz-sl-Rtpa Review  Phone: [phone]  Fax: [phone]  Email: [email or web address]

## 2023-08-01 NOTE — PLAN OF CARE
Problem: PHYSICAL THERAPY ADULT  Goal: Performs mobility at highest level of function for planned discharge setting. See evaluation for individualized goals. Description: Treatment/Interventions: ADL retraining, Functional transfer training, LE strengthening/ROM, Elevations, Therapeutic exercise, Endurance training, Bed mobility, Gait training, Spoke to nursing, OT          See flowsheet documentation for full assessment, interventions and recommendations. Note: Prognosis: Fair  Problem List: Decreased strength, Decreased endurance, Decreased range of motion, Impaired balance, Decreased mobility  Assessment: PT orders received and acknowledged. Patient was seen today for high complexity PT evaluation. High complexity evaluation due to Ongoing medical management for primary dx, Decreased activity tolerance compared to baseline, Fall risk, Increased assistance needed from caregiver at current time, Ongoing telemetry monitoring, Increased O2 via NC from pts baseline, Continuous pulse oximetry monitoring  Patient is a 79 y.o. male who was admitted to [address] [place] [address] on 7/31/23 with acute on chronic respiratory failure with hypoxia and hypercapnia . Pt presents to [facility] with SOB after having difficulty with CPAP machine and recent discharge from hospital . Patients current diagnosis/problem list include lung nodule, elevated troponin, and hyperlipidemia . Patient performed functional mobility as described above. At baseline, pt resides with wife and son in house and was independent prior to hospital admission. Currently, upon initial examination, pt  is requiring supervision A for bed mobility skills; Stephen for functional transfers and Stephen for ambulation with RW. Patient currently presents below baseline with limitations in gait, balance, and transfers. Patient will benefit from continued PT services while in hospital in order to address remaining limitations.  The patients AM-PAC Basic Mobility Inpatient Short From Raw Score is 20 . A Raw score of 20  suggests that the patient may benefit from discharge to home . PT recommendation at this time is for home with OPPT for pulmonary rehab . Please also refer to the recommendation of the Physical Therapist for safe discharge planning. Barriers to Discharge: None     PT Discharge Recommendation: (S) Home with outpatient rehabilitation (pulmonary rehab)    See flowsheet documentation for full assessment.

## 2023-08-01 NOTE — ASSESSMENT & PLAN NOTE
Recently discharged 7/29 for COPD with acute exacerbation. At home, per patient, BiPAP machine has been "broken."  Placed on BiPAP in the ED for a few hours and patient reporting improvement in shortness of breath and wheezing. · Prior to most recent hospitalization was wearing 3L chronically. Upon discharge, he was told he would need 6 L nasal cannula at rest and 8 with ambulation/exertion at baseline as well as BiPAP nightly and as needed. His O2 company was able to provide appropriate equipment and he is able to go up to 10L if needed at home. · Currently requiring 3L at rest.   · Will repeat home O2 eval prior to d/c to clarify o2 requirements, patient/family report confusion regarding this.    · Patient also with issues with BIPAP at home, wife brought in home unit for assessment  · Pulm consult pending  · CM consult  · Rest of plan as below

## 2023-08-01 NOTE — OCCUPATIONAL THERAPY NOTE
Occupational Therapy Evaluation     Patient Name: Loren Carvalho. Today's Date: 8/1/2023  Problem List  Principal Problem:    Acute on chronic respiratory failure with hypoxia and hypercapnia   Active Problems:    COPD exacerbation    Obstructive sleep apnea    GERD (gastroesophageal reflux disease)    Chronic HFrEF (heart failure with reduced ejection fraction)     SIRS (systemic inflammatory response syndrome)    Steroid-induced hyperglycemia    Hyperlipidemia    Severe protein-calorie malnutrition     Elevated troponin    History of bladder cancer    Lung nodule    Past Medical History  Past Medical History:   Diagnosis Date    Acute metabolic encephalopathy 52/64/3310    Arthritis     Bladder mass     Cardiomyopathy (HCC)     Chest pain     COPD (chronic obstructive pulmonary disease) ([address])     COVID-19 virus infection 02/23/2023    CPAP (continuous positive airway pressure) dependence     Emphysema of lung (HCC)     Hypoxia     nocturnal    Left bundle branch block     Multiple pulmonary nodules     last assessed: 10/12/16    Pneumonia     Sleep apnea, obstructive     Smoker     Weight loss 08/29/2019     Past Surgical History  Past Surgical History:   Procedure Laterality Date    COLONOSCOPY      CYSTOSCOPY      CYSTOSCOPY  02/17/2021    MA BLADDER INSTILLATION ANTICARCINOGENIC AGENT N/A 1/3/2020    Procedure: INSTILLATION MITOMYCIN;  Surgeon: Kathy Cook MD;  [facility]: [facility];  Service: Urology    MA CYSTO W/REMOVAL OF LESIONS SMALL N/A 1/3/2020    Procedure: TRANSURETHRAL RESECTION OF BLADDER TUMOR (TURBT);   Surgeon: Kathy Cook MD;  [facility];  Service: Urology    MA CYSTOURETHROSCOPY WITH BIOPSY N/A 4/13/2021    Procedure: Vesnachel Fernando;  Surgeon: Kathy Cook MD;  [facility];  Service: Urology    MA TRURL ELECTROSURG [address] N/A 4/13/2021    Procedure: TRANSURETHRAL RESECTION OF PROSTATE (TURP) BLADDER BIOPSY, FULGURATION; Surgeon: Lucia Sahu MD;  Location:  MAIN OR;  Service: Urology             08/01/23 1420   OT Last Visit   OT Visit Date 08/01/23   Note Type   Note type Evaluation   Pain Assessment   Pain Score No Pain   Restrictions/Precautions   Weight Bearing Precautions Per Order No   Other Precautions Chair Alarm;Multiple lines;Telemetry;O2;Fall Risk   Home Living   Type of [address] [place] [address] One level; Able to live on main level with bedroom/bathroom; Performs ADLs on one level;Stairs to enter with rails   Bathroom Shower/Tub Tub/shower unit   [employer] Grab bars in shower; Shower chair;Grab bars around toilet   [address] Walker;Cane;Wheelchair-manual   Prior Function   Level of [place] Independent with ADLs; Independent with functional mobility; Needs assistance with IADLS   Lives With Spouse; Son   Yolanda Aranda Help From Family   IADLs Family/Friend/Other provides transportation; Family/Friend/Other provides meals; Family/Friend/Other provides medication management   Falls in the last 6 months 0   Vocational Unemployed   Lifestyle   Autonomy I with ADLS and needs assist with IADLs -    Reciprocal Relationships supportive wife and son   Service to Others not currently working   Intrinsic Gratification building model cars   Subjective   Subjective "I want to sit in a chair"   ADL   Where Assessed Edge of bed   Eating Assistance 5  Supervision/Setup   Grooming Assistance 5  Supervision/Setup   UB Bathing Assistance 4  Minimal Assistance   LB Bathing Assistance 2  Maximal [place] 4  [address] 2  Maximal [address] [place]  2  Maximal Assistance   Bed Mobility   Supine to Sit 5  Supervision   Additional items Increased time required   Additional Comments OOB at end of session   Transfers   Sit to Stand 4  Minimal assistance   Additional items Assist x 1   Stand to Sit 4  Minimal assistance   Additional items Assist x 1   Stand pivot 4  Minimal assistance   Additional items Assist x 1   Additional Comments RW   Functional Mobility   Functional Mobility 4  Minimal assistance   Additional Comments less than house hold distances   Additional items Rolling walker   Balance   Static Sitting Fair +   Dynamic Sitting Fair   Static Standing Fair -   Dynamic Standing Poor +   Ambulatory Poor +   Activity Tolerance   Activity Tolerance Patient limited by fatigue   Medical Staff Made Aware DPT, NSG Aware   Nurse Made Aware yes, cleared for OTIE   RUE Assessment   RUE Assessment WFL   LUE Assessment   LUE Assessment WFL   Psychosocial   Psychosocial (WDL) WDL   Cognition   Overall Cognitive Status Impaired   Arousal/Participation Responsive; Cooperative   Attention Attends with cues to redirect   Orientation Level Oriented X4   Memory Decreased recall of precautions   Following Commands Follows one step commands with increased time or repetition   Comments Pt found to be soiled of bowel and bladder and unaware of same, decreased isnight and safety awareness noted   Assessment   Limitation Decreased ADL status; Decreased UE strength;Decreased Safe judgement during ADL;Decreased cognition;Decreased endurance;Decreased self-care trans;Decreased high-level ADLs   Prognosis Fair   Assessment Pt is a 79 y.o. male seen for OT evaluation s/p admit to [facility] on 7/31/2023 w/ Acute on chronic respiratory failure with hypoxia and hypercapnia ([address]).   Comorbidities affecting pt's functional performance at time of assessment include:  has a past medical history of Acute metabolic encephalopathy, Arthritis, Bladder mass, Cardiomyopathy ([address]), Chest pain, COPD (chronic obstructive pulmonary disease) ([address]), COVID-19 virus infection, CPAP (continuous positive airway pressure) dependence, Emphysema of lung ([address]), Hypoxia, Left bundle branch block, Multiple pulmonary nodules, Pneumonia, Sleep apnea, obstructive, Smoker, and Weight loss. . Personal factors affecting pt at time of IE include:steps to enter environment, difficulty performing ADLS, difficulty performing IADLS , limited insight into deficits, decreased initiation and engagement  and health management . Prior to admission, pt was I with ADLS and needed assist for IADLS. Upon evaluation: Pt presents supine on 3 L O2 via NC with the following vital signs: WNL. Pt requires overall S- min A 2* the following deficits impacting occupational performance: weakness, decreased strength, decreased balance, decreased tolerance, decreased safety awareness and decreased coping skills. Pt resting in chair at end of session with all needs in reach, alarm on, all lines in place and SCD's on. Pt to benefit from continued skilled OT tx while in the hospital to address deficits as defined above and maximize level of functional independence w ADL's and functional mobility. Occupational Performance areas to address include: grooming, bathing/shower, toilet hygiene, dressing, health maintenance, functional mobility, community mobility and clothing management. The patient's raw score on the AM-PAC Daily Activity inpatient short form is 16  , standardized score is 35.96  , less than 39.4. Patients at this level are likely to benefit from discharge to post-acute rehabilitation services. However anticipate pt may progress to home with increased support and pulmonary rehab. Please refer to the recommendation of the Occupational Therapist for safe discharge planning. Goals   Patient Goals to go home   LTG Time Frame 10-14   Long Term Goal #1 see below   Plan   Treatment Interventions ADL retraining;Functional transfer training;UE strengthening/ROM; Endurance training;Cognitive reorientation;Patient/family training;Equipment evaluation/education; Compensatory technique education;Continued evaluation; Energy conservation; Activityengagement   Goal Expiration Date 08/15/23   OT Frequency 2-3x/wk   Recommendation   OT Discharge Recommendation Home with home health rehabilitation  (vs STR pendng progress and avaialabe to support to assist with ADLS)   AM-PAC Daily Activity Inpatient   Lower Body Dressing 2   Bathing 2   Toileting 2   Upper Body Dressing 3   Grooming 3   Eating 4   Daily Activity Raw Score 16   Daily Activity Standardized Score (Calc for Raw Score >=11) 35.96   AM-PAC Applied Cognition Inpatient   Following a Speech/Presentation 2   Understanding Ordinary Conversation 4   Taking Medications 4   Remembering Where Things Are Placed or Put Away 3   Remembering List of 4-5 Errands 2   Taking Care of Complicated Tasks 2   Applied Cognition Raw Score 17   Applied Cognition Standardized Score 36.52     OT goals to be addressed in the next 14 days:    Pt will increase activity tolerance to G for 30 min txment sessions    Pt will complete UB/LB self care w/ mod I using adaptive device and DME as needed    Pt will complete toileting w/ mod I w/ G hygiene/thoroughness using DME as needed    Pt will improve functional transfers to Mod I on/off all surfaces using DME as needed w/ G balance/safety     Pt will improve functional mobility during ADL/IADL/leisure tasks to Mod I using DME as needed w/ G balance/safety     Pt will demonstrate 100% carryover of energy conservation techniques t/o functional I/ADL/leisure tasks w/o cues s/p skilled education    Pt will independently identify and utilize 2-3 coping strategies to increase positive affect and promote overall well-being.     Pt will engage in ongoing cognitive assessment w/ G participation to assist w/ safe d/c planning/recommendations

## 2023-08-02 LAB
ANION GAP SERPL CALCULATED.3IONS-SCNC: 2 MMOL/L
ATRIAL RATE: 96 BPM
BUN SERPL-MCNC: 38 MG/DL (ref 5–25)
CALCIUM SERPL-MCNC: 9 MG/DL (ref 8.3–10.1)
CHLORIDE SERPL-SCNC: 89 MMOL/L (ref 96–108)
CO2 SERPL-SCNC: 43 MMOL/L (ref 21–32)
CREAT SERPL-MCNC: 0.79 MG/DL (ref 0.6–1.3)
ERYTHROCYTE [DISTWIDTH] IN BLOOD BY AUTOMATED COUNT: 12.5 % (ref 11.6–15.1)
GFR SERPL CREATININE-BSD FRML MDRD: 93 ML/MIN/1.73SQ M
GLUCOSE SERPL-MCNC: 234 MG/DL (ref 65–140)
GLUCOSE SERPL-MCNC: 273 MG/DL (ref 65–140)
GLUCOSE SERPL-MCNC: 304 MG/DL (ref 65–140)
GLUCOSE SERPL-MCNC: 321 MG/DL (ref 65–140)
GLUCOSE SERPL-MCNC: 342 MG/DL (ref 65–140)
GLUCOSE SERPL-MCNC: 380 MG/DL (ref 65–140)
HCT VFR BLD AUTO: 28.2 % (ref 36.5–49.3)
HGB BLD-MCNC: 8.8 G/DL (ref 12–17)
MAGNESIUM SERPL-MCNC: 2.3 MG/DL (ref 1.6–2.6)
MCH RBC QN AUTO: 31 PG (ref 26.8–34.3)
MCHC RBC AUTO-ENTMCNC: 31.2 G/DL (ref 31.4–37.4)
MCV RBC AUTO: 99 FL (ref 82–98)
P AXIS: 80 DEGREES
PHOSPHATE SERPL-MCNC: 2.9 MG/DL (ref 2.3–4.1)
PLATELET # BLD AUTO: 207 THOUSANDS/UL (ref 149–390)
PMV BLD AUTO: 10 FL (ref 8.9–12.7)
POTASSIUM SERPL-SCNC: 4 MMOL/L (ref 3.5–5.3)
PR INTERVAL: 158 MS
QRS AXIS: 71 DEGREES
QRSD INTERVAL: 129 MS
QT INTERVAL: 383 MS
QTC INTERVAL: 484 MS
RBC # BLD AUTO: 2.84 MILLION/UL (ref 3.88–5.62)
SODIUM SERPL-SCNC: 134 MMOL/L (ref 135–147)
T WAVE AXIS: 181 DEGREES
VENTRICULAR RATE: 96 BPM
WBC # BLD AUTO: 8.56 THOUSAND/UL (ref 4.31–10.16)

## 2023-08-02 PROCEDURE — 99232 SBSQ HOSP IP/OBS MODERATE 35: CPT | Performed by: INTERNAL MEDICINE

## 2023-08-02 PROCEDURE — 84100 ASSAY OF PHOSPHORUS: CPT | Performed by: INTERNAL MEDICINE

## 2023-08-02 PROCEDURE — 93005 ELECTROCARDIOGRAM TRACING: CPT

## 2023-08-02 PROCEDURE — 94760 N-INVAS EAR/PLS OXIMETRY 1: CPT

## 2023-08-02 PROCEDURE — 83735 ASSAY OF MAGNESIUM: CPT | Performed by: INTERNAL MEDICINE

## 2023-08-02 PROCEDURE — 94664 DEMO&/EVAL PT USE INHALER: CPT

## 2023-08-02 PROCEDURE — 85027 COMPLETE CBC AUTOMATED: CPT | Performed by: INTERNAL MEDICINE

## 2023-08-02 PROCEDURE — 94640 AIRWAY INHALATION TREATMENT: CPT

## 2023-08-02 PROCEDURE — 99232 SBSQ HOSP IP/OBS MODERATE 35: CPT | Performed by: NURSE PRACTITIONER

## 2023-08-02 PROCEDURE — 80048 BASIC METABOLIC PNL TOTAL CA: CPT | Performed by: INTERNAL MEDICINE

## 2023-08-02 PROCEDURE — 82948 REAGENT STRIP/BLOOD GLUCOSE: CPT

## 2023-08-02 PROCEDURE — 94660 CPAP INITIATION&MGMT: CPT

## 2023-08-02 PROCEDURE — 93010 ELECTROCARDIOGRAM REPORT: CPT | Performed by: INTERNAL MEDICINE

## 2023-08-02 RX ORDER — INSULIN LISPRO 100 [IU]/ML
3 INJECTION, SOLUTION INTRAVENOUS; SUBCUTANEOUS
Status: DISCONTINUED | OUTPATIENT
Start: 2023-08-02 | End: 2023-08-03 | Stop reason: HOSPADM

## 2023-08-02 RX ADMIN — DOCUSATE SODIUM 100 MG: 100 CAPSULE ORAL at 09:07

## 2023-08-02 RX ADMIN — IPRATROPIUM BROMIDE 0.5 MG: 0.5 SOLUTION RESPIRATORY (INHALATION) at 02:25

## 2023-08-02 RX ADMIN — INSULIN LISPRO 3 UNITS: 100 INJECTION, SOLUTION INTRAVENOUS; SUBCUTANEOUS at 06:06

## 2023-08-02 RX ADMIN — METHYLPREDNISOLONE SODIUM SUCCINATE 40 MG: 40 INJECTION, POWDER, FOR SOLUTION INTRAMUSCULAR; INTRAVENOUS at 21:39

## 2023-08-02 RX ADMIN — LEVALBUTEROL HYDROCHLORIDE 1.25 MG: 1.25 SOLUTION RESPIRATORY (INHALATION) at 20:52

## 2023-08-02 RX ADMIN — TORSEMIDE 40 MG: 20 TABLET ORAL at 09:06

## 2023-08-02 RX ADMIN — AZITHROMYCIN DIHYDRATE 250 MG: 250 TABLET, FILM COATED ORAL at 09:06

## 2023-08-02 RX ADMIN — INSULIN LISPRO 3 UNITS: 100 INJECTION, SOLUTION INTRAVENOUS; SUBCUTANEOUS at 17:40

## 2023-08-02 RX ADMIN — IPRATROPIUM BROMIDE 0.5 MG: 0.5 SOLUTION RESPIRATORY (INHALATION) at 14:10

## 2023-08-02 RX ADMIN — CYANOCOBALAMIN TAB 500 MCG 1000 MCG: 500 TAB at 09:06

## 2023-08-02 RX ADMIN — DOCUSATE SODIUM 100 MG: 100 CAPSULE ORAL at 17:40

## 2023-08-02 RX ADMIN — HEPARIN SODIUM 5000 UNITS: 5000 INJECTION INTRAVENOUS; SUBCUTANEOUS at 21:39

## 2023-08-02 RX ADMIN — POTASSIUM CHLORIDE 20 MEQ: 1500 TABLET, EXTENDED RELEASE ORAL at 09:06

## 2023-08-02 RX ADMIN — METHYLPREDNISOLONE SODIUM SUCCINATE 40 MG: 40 INJECTION, POWDER, FOR SOLUTION INTRAMUSCULAR; INTRAVENOUS at 09:06

## 2023-08-02 RX ADMIN — HEPARIN SODIUM 5000 UNITS: 5000 INJECTION INTRAVENOUS; SUBCUTANEOUS at 09:06

## 2023-08-02 RX ADMIN — Medication 6 MG: at 21:38

## 2023-08-02 RX ADMIN — IPRATROPIUM BROMIDE 0.5 MG: 0.5 SOLUTION RESPIRATORY (INHALATION) at 20:52

## 2023-08-02 RX ADMIN — LEVALBUTEROL HYDROCHLORIDE 1.25 MG: 1.25 SOLUTION RESPIRATORY (INHALATION) at 14:10

## 2023-08-02 RX ADMIN — POTASSIUM CHLORIDE 20 MEQ: 1500 TABLET, EXTENDED RELEASE ORAL at 17:40

## 2023-08-02 RX ADMIN — IPRATROPIUM BROMIDE 0.5 MG: 0.5 SOLUTION RESPIRATORY (INHALATION) at 07:17

## 2023-08-02 RX ADMIN — FORMOTEROL FUMARATE DIHYDRATE 20 MCG: 20 SOLUTION RESPIRATORY (INHALATION) at 20:52

## 2023-08-02 RX ADMIN — FAMOTIDINE 20 MG: 20 TABLET, FILM COATED ORAL at 09:06

## 2023-08-02 RX ADMIN — FORMOTEROL FUMARATE DIHYDRATE 20 MCG: 20 SOLUTION RESPIRATORY (INHALATION) at 07:17

## 2023-08-02 RX ADMIN — ATORVASTATIN CALCIUM 20 MG: 20 TABLET, FILM COATED ORAL at 17:42

## 2023-08-02 RX ADMIN — LEVALBUTEROL HYDROCHLORIDE 1.25 MG: 1.25 SOLUTION RESPIRATORY (INHALATION) at 07:17

## 2023-08-02 RX ADMIN — LEVALBUTEROL HYDROCHLORIDE 1.25 MG: 1.25 SOLUTION RESPIRATORY (INHALATION) at 02:25

## 2023-08-02 RX ADMIN — TAMSULOSIN HYDROCHLORIDE 0.4 MG: 0.4 CAPSULE ORAL at 17:42

## 2023-08-02 RX ADMIN — BUDESONIDE 0.5 MG: 0.5 INHALANT ORAL at 20:52

## 2023-08-02 RX ADMIN — FAMOTIDINE 20 MG: 20 TABLET, FILM COATED ORAL at 17:40

## 2023-08-02 RX ADMIN — INSULIN LISPRO 4 UNITS: 100 INJECTION, SOLUTION INTRAVENOUS; SUBCUTANEOUS at 21:40

## 2023-08-02 RX ADMIN — BUDESONIDE 0.5 MG: 0.5 INHALANT ORAL at 07:17

## 2023-08-02 RX ADMIN — INSULIN LISPRO 4 UNITS: 100 INJECTION, SOLUTION INTRAVENOUS; SUBCUTANEOUS at 12:17

## 2023-08-02 NOTE — ASSESSMENT & PLAN NOTE
Patient with end-stage COPD. Has been on prednisone tapering course outpatient from recent hospitalization/discharge. · IV Solu-Medrol for now with plan to transition to prednisone taper tomorrow. Shavon added, will continue on d/c MWF. · Continue Pulmicort/Perforomist  · Respiratory protocol  · Goals of care have been discussed in the past given end-stage COPD with recurrent hospitalization and frequent exacerbations, most recently during hospitalization end of July however patient declines hospice.

## 2023-08-02 NOTE — PROGRESS NOTES
PULMONOLOGY PROGRESS NOTE     Name: Zander Vargas. Age & Sex: 79 y.o. male   MRN: [id number]  [facility]   Encounter: [id number]    PATIENT INFORMATION     Name: Zander Vargas. Age & Sex: 79 y.o. male   MRN: [id number]  Hospital Stay Days: 2    ASSESSMENT/PLAN     Assessment:   1. End-Stage COPD  2. KEVIN w/BiPAP  3. Chronic hypoxemic/hypercapneic respiratory failure  4. Chronic systolic heart failure  5. GERD  6. Hyperlipidemia    Plan:  • Tentative discharge 8/3/2023 AM  • He has been stable with BiPAP qhs and home oxygen of 3L NC  • Will receive azithromycin 250 mg today, scheduled for 9 am and then check EKG for QT prolongation in the afternoon, if stable, will discharge on azithromycin Monday/Wednesday/Friday   • Can transition from IV solumedrol to prednisone taper    • Continue formoterol/budesonide nebs q12h with standing xopenex/atrovent  • Dr. Beverly Cardenas spoke with [employer] as previously stated in consult note. Patient and family will receive at-home set-up instructions. • Follow-up in outpatient setting for evaluation of 6 mm nodule with a CT scan in 6 months  • Systolic heart failure, GERD, and hyperilipidemia per primary      SUBJECTIVE     Patient seen and examined. No acute events overnight. He states that he slept well with BiPAP today. He has no acute concerns or symptoms at this time. He did express that he was SOB and had chest tightness at home with his new machine, but we discussed again that [employer] will be doing a home set-up to ensure it works well for him. Review of Systems   Constitutional: Negative for chills and fever. HENT: Negative for sore throat. Respiratory: Negative for cough, chest tightness and shortness of breath. Cardiovascular: Negative for chest pain and palpitations. Gastrointestinal: Negative for abdominal distention, abdominal pain, nausea and vomiting.          OBJECTIVE     Vitals:    08/02/23 9027 08/02/23 1825 08/02/23 0740 23 0741   BP:   107/73    Pulse:   84    Resp:   (!) 23    Temp:   97.9 °F (36.6 °C)    TempSrc:   Axillary    SpO2: 100% 100% 100% 99%      Temperature:   Temp (24hrs), Av.7 °F (36.5 °C), Min:95.7 °F (35.4 °C), Max:98.4 °F (36.9 °C)    Temperature: 97.9 °F (36.6 °C)  Intake & Output:  I/O        07 0700  07 07 07 0700    P. O. 240 878     Total Intake 240 878     Urine 700 1500     Stool 0      Total Output 700 1500     Net -460 -622            Unmeasured Urine Occurrence 2 x      Unmeasured Stool Occurrence 1 x          Weights: There is no height or weight on file to calculate BMI. Weight (last 2 days)     None        Physical Exam  Constitutional:       General: He is awake. Appearance: He is cachectic. HENT:      Head: Normocephalic. Mouth/Throat:      Mouth: Mucous membranes are moist.      Pharynx: Oropharynx is clear. Uvula midline. Eyes:      Conjunctiva/sclera: Conjunctivae normal.   Cardiovascular:      Pulses: Normal pulses. Heart sounds: Heart sounds are distant. Pulmonary:      Effort: Accessory muscle usage present. No respiratory distress. Breath sounds: Decreased breath sounds and wheezing present. Abdominal:      General: Abdomen is flat. Bowel sounds are normal.      Palpations: Abdomen is soft. Tenderness: There is no abdominal tenderness. Neurological:      Mental Status: He is alert and oriented to person, place, and time. GCS: GCS eye subscore is 4. GCS verbal subscore is 5. GCS motor subscore is 6. Psychiatric:         Behavior: Behavior is cooperative. LABORATORY DATA     Labs: I have personally reviewed pertinent reports.   Results from last 7 days   Lab Units 23  0602 23  0605 23  1121   WBC Thousand/uL 8.56 12.49* 20.09*   HEMOGLOBIN g/dL 8.8* 9.3* 12.8   HEMATOCRIT % 28.2* 30.0* 40.2   PLATELETS Thousands/uL 207 212 239   NEUTROS PCT %  --   --  93*   MONOS PCT %  --   --  5   MONO PCT %  --  1*  --    EOS PCT %  --  0 0      Results from last 7 days   Lab Units 08/02/23  0602 08/01/23  0605 07/31/23  1121   POTASSIUM mmol/L 4.0 4.4 4.2   CHLORIDE mmol/L 89* 85* 81*   CO2 mmol/L 43* 45* 44*   BUN mg/dL 38* 38* 32*   CREATININE mg/dL 0.79 0.77 0.66   CALCIUM mg/dL 9.0 9.6 9.8     Results from last 7 days   Lab Units 08/02/23  0602 08/01/23  0605   MAGNESIUM mg/dL 2.3 2.5     Results from last 7 days   Lab Units 08/02/23  0602 08/01/23  0605   PHOSPHORUS mg/dL 2.9 4.1                      ABG:       Micro:         IMAGING & DIAGNOSTIC TESTING     Radiology Results: I have personally reviewed pertinent reports. XR chest portable    Result Date: 8/1/2023  Impression: 1. Emphysematous changes with mild pulmonary vascular congestion. Patchy groundglass opacities and right upper lobe nodule identified on same day CT are not well seen on this exam. Please refer to that CT chest report of the same day for follow-up recommendations. Workstation performed: [id number]     CTA ED chest PE Study    Result Date: 7/31/2023  Impression: No pulmonary embolus. Moderate patchy bilateral groundglass opacity and consolidation, new from December 2022, infectious/inflammatory. New 6 mm right upper lobe nodule. Per 2017 Fleischner Society guidelines, recommend follow-up with a chest CT in 6 months. Moderate emphysema. This study was marked in Epic for immediate notification and follow-up. Workstation performed: [id number]     Other Diagnostic Testing: I have personally reviewed pertinent reports.     ACTIVE MEDICATIONS     Current Facility-Administered Medications   Medication Dose Route Frequency   • acetaminophen (TYLENOL) tablet 650 mg  650 mg Oral Q6H PRN   • atorvastatin (LIPITOR) tablet 20 mg  20 mg Oral Daily With Dinner   • azithromycin (ZITHROMAX) tablet 250 mg  250 mg Oral Q24H GABE   • budesonide (PULMICORT) inhalation solution 0.5 mg  0.5 mg Nebulization Q12H   • cyanocobalamin (VITAMIN B-12) tablet 1,000 mcg  1,000 mcg Oral Daily   • docusate sodium (COLACE) capsule 100 mg  100 mg Oral BID   • famotidine (PEPCID) tablet 20 mg  20 mg Oral BID   • formoterol (PERFOROMIST) nebulizer solution 20 mcg  20 mcg Nebulization Q12H   • heparin (porcine) subcutaneous injection 5,000 Units  5,000 Units Subcutaneous Q12H [address]   • insulin lispro (HumaLOG) 100 units/mL subcutaneous injection 1-5 Units  1-5 Units Subcutaneous TID AC   • insulin lispro (HumaLOG) 100 units/mL subcutaneous injection 1-5 Units  1-5 Units Subcutaneous HS   • ipratropium (ATROVENT) 0.02 % inhalation solution 0.5 mg  0.5 mg Nebulization Q6H   • levalbuterol (XOPENEX) inhalation solution 1.25 mg  1.25 mg Nebulization Q6H   • melatonin tablet 6 mg  6 mg Oral HS   • methylPREDNISolone sodium succinate (Solu-MEDROL) injection 40 mg  40 mg Intravenous Q12H [address]   • potassium chloride (K-DUR,KLOR-CON) CR tablet 20 mEq  20 mEq Oral BID   • tamsulosin (FLOMAX) capsule 0.4 mg  0.4 mg Oral Daily With Dinner   • torsemide (DEMADEX) tablet 40 mg  40 mg Oral Daily       Anibal Kinsey   MS-4

## 2023-08-02 NOTE — PROGRESS NOTES
FLOR STUDENT  Inpatient Progress Note for TRAINING ONLY  Not Part of Legal Medical Record     Progress Note - Sandra Villalta. 79 y.o. male MRN: [id number]  Unit/Bed#: [facility] 209-01 Encounter: [id number]           Acute on chronic respiratory failure with hypoxia - Presents with hypoxia after difficulty with BiPAP at home. Improved in ED on BiPap CT: Moderate patchy bilateral groundglass opacity and consolidation. New 6 mm right upper lobe nodule. Per 2017 Fleischner Society guidelines, recommend follow-up with a chest CT in 6 months.                  Currently on 3L O2 at rest, was recently discharged to home with 6L at rest and 8L with activity. Patients O2 company provided change in equipment to facilitate this change with ability to titrate to 10. Patients family reporting confusion on O2 needs - respiratory eval today to get at home needs               Solumedrol 40 mg,l transition to PO taper    Pulmonary following             Azithromycin maintenance therapy per pulmonary - 250mg today than Monday Wednesday Friday. EKG per pulmonary for QTC   - normal              COPD                 Continue with home inhalers               Bipap as ordered               Respiratory protocol    [facility] to f/u at discharge to ensure bipap is functioning appropriately                    Nonischemic cardiomyopathy - last EF 20%                 Continue Torsemide               Low Na diet with FR - 1800cc        Obstructive sleep apnea                  BiPAP as ordered      SIRS - presented with leukocytosis, tachypnea, tachycardia in setting of acute/chronic COPD.   Chest x-ray with mild pulmonary vascular congestion, emphysematous changes.                 Procalcitonin 0.65              Afebrile              Leukocytosis - trending down              CBC daily   Azithromycin maintenance therapy per pulmonary      Elevated Troponin - peak of 50 in the setting of acute COPD exacerbation    Denies CP              Monitor     Lung nodule  Noted on CTA--New 6 mm right upper lobe nodule.                 recommend follow-up with a chest CT in 6 months.          VTE Pharmacologic Prophylaxis:   Pharmacologic: Heparin  Mechanical VTE Prophylaxis in Place: Yes    Patient Centered Rounds: I have performed bedside rounds with nursing staff today. Discussions with Specialists or Other Care Team Provider: NEEMA, nursing     Education and Discussions with Family / Patient: Discussion on plan for discharge    Time Spent for Care: 1 hour. More than 50% of total time spent on counseling and coordination of care as described above. Current Length of Stay: 2 day(s)    Current Patient Status: Inpatient   Certification Statement: {Certification [id number]}    Discharge Plan: ***    Code Status: Level 1 - Full Code    Subjective:   Patient expressing desire to be discharged today. States he feels like his breathing is "normal". Discussion had with patient about possible discharge tomorrow, since [address] [place] [address] Rd is set up to meet him at home and make sure his home set up is adequate    Objective:     Vitals:   Temp (24hrs), Av.6 °F (36.4 °C), Min:95.7 °F (35.4 °C), Max:98.4 °F (36.9 °C)    Temp:  [95.7 °F (35.4 °C)-98.4 °F (36.9 °C)] 97.9 °F (36.6 °C)  HR:  [78-94] 84  Resp:  [23-35] 23  BP: (103-107)/(68-74) 107/73  SpO2:  [95 %-100 %] 99 %  There is no height or weight on file to calculate BMI. Input and Output Summary (last 24 hours): Intake/Output Summary (Last 24 hours) at 2023 0911  Last data filed at 2023 0500  Gross per 24 hour   Intake 878 ml   Output 1500 ml   Net -622 ml       Physical Exam:     Physical Exam  Vitals reviewed. HENT:      Mouth/Throat:      Mouth: Mucous membranes are moist.   Cardiovascular:      Rate and Rhythm: Tachycardia present. Pulses: Normal pulses. Pulmonary:      Effort: Tachypnea and accessory muscle usage present.       Breath sounds: Examination of the right-upper field reveals decreased breath sounds and wheezing. Examination of the left-upper field reveals decreased breath sounds and wheezing. Examination of the right-middle field reveals decreased breath sounds. Examination of the left-middle field reveals decreased breath sounds. Examination of the right-lower field reveals decreased breath sounds and rhonchi. Examination of the left-lower field reveals decreased breath sounds and rhonchi. Decreased breath sounds, wheezing and rhonchi present. Abdominal:      General: Abdomen is flat. Bowel sounds are normal.      Palpations: Abdomen is soft. Skin:     General: Skin is warm. Capillary Refill: Capillary refill takes less than 2 seconds. Neurological:      Mental Status: He is alert and oriented to person, place, and time. Historical Information   Past Medical History:   Diagnosis Date   • Acute metabolic encephalopathy 11/98/8122   • Arthritis    • Bladder mass    • Cardiomyopathy [facility])    • Chest pain    • COPD (chronic obstructive pulmonary disease) ([facility])    • COVID-19 virus infection 02/23/2023   • CPAP (continuous positive airway pressure) dependence    • Emphysema of lung (HCC)    • Hypoxia     nocturnal   • Left bundle branch block    • Multiple pulmonary nodules     last assessed: 10/12/16   • Pneumonia    • Sleep apnea, obstructive    • Smoker    • Weight loss 08/29/2019     Past Surgical History:   Procedure Laterality Date   • COLONOSCOPY     • CYSTOSCOPY     • CYSTOSCOPY  02/17/2021   • NC BLADDER INSTILLATION ANTICARCINOGENIC AGENT N/A 1/3/2020    Procedure: INSTILLATION MITOMYCIN;  Surgeon: Surendra Ayala MD;  [facility];  Service: Urology   • NC CYSTO W/REMOVAL OF LESIONS SMALL N/A 1/3/2020    Procedure: TRANSURETHRAL RESECTION OF BLADDER TUMOR (TURBT);   Surgeon: Surendra Ayala MD;  [facility];  Service: Urology   • NC CYSTOURETHROSCOPY WITH BIOPSY N/A 4/13/2021    Procedure: CYSTOSCOPY WITH BIOPSIES;  Surgeon: Elba Minor MD;  [facility];  Service: Urology   • VA TRURL ELECTROSURG [address] PROSTATE BLEED COMPLETE N/A 2021    Procedure: TRANSURETHRAL RESECTION OF PROSTATE (TURP) BLADDER BIOPSY, FULGURATION;  Surgeon: Elba Minor MD;  [facility];  Service: Urology     Social History   Social History     Substance and Sexual Activity   Alcohol Use Not Currently    Comment: rarely     Social History     Substance and Sexual Activity   Drug Use No     Social History     Tobacco Use   Smoking Status Former   • Packs/day: 2.50   • Years: 42.00   • Total pack years: 105.00   • Types: Cigarettes   • Start date:    • Quit date:    • Years since quittin.5   Smokeless Tobacco Former   Tobacco Comments    1 ppd for 37 years,  down to 5 cigs a day, is around second hand smoke     Family History: non-contributory    Meds/Allergies   all medications and allergies reviewed  No Known Allergies    Additional Data:     Labs:    Results from last 7 days   Lab Units 23  0602 23  0605 23  1121   WBC Thousand/uL 8.56 12.49* 20.09*   HEMOGLOBIN g/dL 8.8* 9.3* 12.8   HEMATOCRIT % 28.2* 30.0* 40.2   PLATELETS Thousands/uL 207 212 239   NEUTROS PCT %  --   --  93*   LYMPHS PCT %  --   --  1*   LYMPHO PCT %  --  1*  --    MONOS PCT %  --   --  5   MONO PCT %  --  1*  --    EOS PCT %  --  0 0     Results from last 7 days   Lab Units 23  0602   SODIUM mmol/L 134*   POTASSIUM mmol/L 4.0   CHLORIDE mmol/L 89*   CO2 mmol/L 43*   BUN mg/dL 38*   CREATININE mg/dL 0.79   ANION GAP mmol/L 2   CALCIUM mg/dL 9.0   GLUCOSE RANDOM mg/dL 273*         Results from last 7 days   Lab Units 23  0757 23  0601 23  2113 23  1640 23  1103 23  0645 230 23  1537 23  1202 23  0643 23  2055 23  1548   POC GLUCOSE mg/dl 234* 304* 298* 338* 163* 151* 314* 314* 236* 172* 152* 276* Results from last 7 days   Lab Units 07/31/23 1917   PROCALCITONIN ng/ml 0.65*         * I Have Reviewed All Lab Data Listed Above. * Additional Pertinent Lab Tests Reviewed: [address] Admission Reviewed    Imaging:    Imaging Reports Reviewed Today Include: none  Imaging Personally Reviewed by Myself Includes:  none    Recent Cultures (last 7 days):           Last 24 Hours Medication List:   Current Facility-Administered Medications   Medication Dose Route Frequency Provider Last Rate   • acetaminophen  650 mg Oral Q6H PRN Rocio Estes MD     • atorvastatin  20 mg Oral Daily With Tony Rasheed MD     • azithromycin  250 mg Oral Q24H Batool Ramey MD     • budesonide  0.5 mg Nebulization Q12H Ty Lim MD     • cyanocobalamin  1,000 mcg Oral Daily Rocio Estes MD     • docusate sodium  100 mg Oral BID Rocio Estes MD     • famotidine  20 mg Oral BID Rocio Estes MD     • formoterol  20 mcg Nebulization Q12H Ty Lim MD     • heparin (porcine)  5,000 Units Subcutaneous Q12H [address] Rocio Estes MD     • insulin lispro  1-5 Units Subcutaneous TID AC DELIA Lyn     • insulin lispro  1-5 Units Subcutaneous HS DELIA Lyn     • ipratropium  0.5 mg Nebulization Q6H Rocio Estes MD     • levalbuterol  1.25 mg Nebulization Q6H Rocio Estes MD     • melatonin  6 mg Oral HS Rocio Estes MD     • methylPREDNISolone sodium succinate  40 mg Intravenous Q12H [address] Rocio Estes MD     • potassium chloride  20 mEq Oral BID Rocio Estes MD     • tamsulosin  0.4 mg Oral Daily With Tony Rasheed MD     • torsemide  40 mg Oral Daily Rocio Estes MD          Today, Patient Was Seen By: Celso Dupont    ** Please Note: Dictation voice to text software may have been used in the creation of this document.  **

## 2023-08-02 NOTE — PROGRESS NOTES
[facility]  Progress Note  Name: Sanchez Boles  MRN: [id number]  Unit/Bed#: [facility] 209-01 I Date of Admission: 7/31/2023   Date of Service: 8/2/2023 I Hospital Day: 2    Assessment/Plan   * Acute on chronic respiratory failure with hypoxia and hypercapnia   Assessment & Plan  Recently discharged 7/29 for COPD with acute exacerbation. At home, per patient, BiPAP machine has been "broken."  Placed on BiPAP in the ED for a few hours and patient reporting improvement in shortness of breath and wheezing. · Prior to most recent hospitalization was wearing 3L chronically. Upon discharge, he was told he would need 6 L nasal cannula at rest and 8 with ambulation/exertion at baseline as well as BiPAP nightly and as needed. His O2 company was able to provide appropriate equipment and he is able to go up to 10L if needed at home. · This admission has been requiring 3L at rest.   · Repeat home O2 eval prior to d/c to clarify o2 requirements, spoke to RT. · Patient also with issues with BIPAP at home, [facility] to go to home tomorrow to review Bipap with patient and address any issues . · Pulm following, plan for d/c tomorrow. · CM following, plan for home with [facility] and outpatient pulm rehab (will need referral on d/c)  · Rest of plan as below    COPD exacerbation  Assessment & Plan  Patient with end-stage COPD. Has been on prednisone tapering course outpatient from recent hospitalization/discharge. · IV Solu-Medrol for now with plan to transition to prednisone taper tomorrow. Shavon added, will continue on d/c MWF. · Continue Pulmicort/Perforomist  · Respiratory protocol  · Goals of care have been discussed in the past given end-stage COPD with recurrent hospitalization and frequent exacerbations, most recently during hospitalization end of July however patient declines hospice.      SIRS (systemic inflammatory response syndrome)  Assessment & Plan  SIRS versus sepsis POA as evidenced by leukocytosis, tachypnea, tachycardia. · Likely reactive due to presenting acute/chronic issues- currently afebrile  · Chest x-ray with mild pulmonary vascular congestion, emphysematous changes. · CTA chest negative for PE. Patchy bilateral groundglass opacities and consolidation infectious versus inflammatory. · Procalcitonin is mildly elevated at 0.65, unclear significance. · Leukocytosis much improved today, will trend. · Hold off on antibiotics for now and monitor clinically    Elevated troponin  Assessment & Plan  · Troponin peak of 50 with a subsequent downtrend  · Likely non-MI troponin elevation in the setting of above issues  · No chest pain    Chronic HFrEF (heart failure with reduced ejection fraction)   Assessment & Plan  Last EF of 20% in May 2023  · Continue home dose of torsemide with potassium supplementation. · Does not appear to be on beta-blocker at baseline. · Low-sodium diet with fluid restriction  · Previously deemed by cardiology as not a candidate for advanced heart failure therapies    History of bladder cancer  Assessment & Plan  · No acute issues. · Status post TURBT and mitomycin instillation  · Outpatient follow-up    Steroid-induced hyperglycemia  Assessment & Plan  Recent A1c 5.7. Mild hyperglycemia noted, likely steroid-induced. · Add humalog with meals + SSI    Obstructive sleep apnea  Assessment & Plan  · BIPAP QHS    Hyperlipidemia  Assessment & Plan  · Continue statin    GERD (gastroesophageal reflux disease)  Assessment & Plan  · Continue Pepcid    Lung nodule  Assessment & Plan  · Noted on CTA--New 6 mm right upper lobe nodule. Per 2017 Fleischner Society guidelines, recommend follow-up with a chest CT in 6 months.     Severe protein-calorie malnutrition   Assessment & Plan  BMI of 17.67 in the setting of chronic illness coupled with decreased appetite/oral intake and evidence of muscle wasting/atrophy and exam  · Nutrition follow     Goals of care, counseling/discussion  Assessment & Plan  · Not interested in discussing further. Was made DNR/DNI on last admission however currently requests to be full code. VTE Pharmacologic Prophylaxis: VTE Score: 2 Moderate Risk (Score 3-4) - Pharmacological DVT Prophylaxis Ordered: heparin. Patient Centered Rounds: I performed bedside rounds with nursing staff today. Discussions with Specialists or Other Care Team Provider: nursing, case management     Education and Discussions with Family / Patient: Updated  (wife) at bedside. by my CRNP student. Total Time Spent on Date of Encounter in care of patient: 35 minutes This time was spent on one or more of the following: performing physical exam; counseling and coordination of care; obtaining or reviewing history; documenting in the medical record; reviewing/ordering tests, medications or procedures; communicating with other healthcare professionals and discussing with patient's family/caregivers. Current Length of Stay: 2 day(s)  Current Patient Status: Inpatient   Certification Statement: The patient will continue to require additional inpatient hospital stay due to IV steroids  Discharge Plan: Anticipate discharge tomorrow to home with home services. Code Status: Level 1 - Full Code    Subjective:   No acute complaints. Wants to go home. Wants us to clarify how much oxygen he should wear at home, informed of resp home o2 eval.     Objective:     Vitals:   Temp (24hrs), Av.7 °F (36.5 °C), Min:95.7 °F (35.4 °C), Max:98.9 °F (37.2 °C)    Temp:  [95.7 °F (35.4 °C)-98.9 °F (37.2 °C)] 98.9 °F (37.2 °C)  HR:  [76-92] 76  Resp:  [23-34] 33  BP: (103-108)/(68-74) 108/73  SpO2:  [97 %-100 %] 97 %  There is no height or weight on file to calculate BMI. Input and Output Summary (last 24 hours):      Intake/Output Summary (Last 24 hours) at 2023 1439  Last data filed at 2023 1341  Gross per 24 hour   Intake 866 ml   Output 1250 ml Net -384 ml       Physical Exam:   Physical Exam  Vitals and nursing note reviewed. Constitutional:       General: He is not in acute distress. Appearance: He is ill-appearing (chronically ill appearing). Interventions: Nasal cannula in place. Cardiovascular:      Rate and Rhythm: Normal rate. Pulmonary:      Breath sounds: Wheezing and rhonchi present. Comments: Intermittently tachypenic   Musculoskeletal:         General: No swelling. Skin:     General: Skin is warm. Neurological:      Mental Status: He is alert and oriented to person, place, and time. Mental status is at baseline.       Comments: forgetful   Psychiatric:         Mood and Affect: Mood normal.          Additional Data:     Labs:  Results from last 7 days   Lab Units 08/02/23  0602 08/01/23  0605 07/31/23  1121   WBC Thousand/uL 8.56 12.49* 20.09*   HEMOGLOBIN g/dL 8.8* 9.3* 12.8   HEMATOCRIT % 28.2* 30.0* 40.2   PLATELETS Thousands/uL 207 212 239   NEUTROS PCT %  --   --  93*   LYMPHS PCT %  --   --  1*   LYMPHO PCT %  --  1*  --    MONOS PCT %  --   --  5   MONO PCT %  --  1*  --    EOS PCT %  --  0 0     Results from last 7 days   Lab Units 08/02/23  0602   SODIUM mmol/L 134*   POTASSIUM mmol/L 4.0   CHLORIDE mmol/L 89*   CO2 mmol/L 43*   BUN mg/dL 38*   CREATININE mg/dL 0.79   ANION GAP mmol/L 2   CALCIUM mg/dL 9.0   GLUCOSE RANDOM mg/dL 273*         Results from last 7 days   Lab Units 08/02/23  1140 08/02/23  0757 08/02/23  0601 08/01/23  2113 08/01/23  1640 07/29/23  1103 07/29/23  0645 07/28/23  2050 07/28/23  1537 07/28/23  1202 07/28/23  0643 07/27/23  2055   POC GLUCOSE mg/dl 380* 234* 304* 298* 338* 163* 151* 314* 314* 236* 172* 152*         Results from last 7 days   Lab Units 07/31/23  1917   PROCALCITONIN ng/ml 0.65*       Lines/Drains:  Invasive Devices     Peripheral Intravenous Line  Duration           Peripheral IV 07/31/23 Right Antecubital 2 days          Drain  Duration           External Urinary Catheter 2 days                      Imaging: No pertinent imaging reviewed. Recent Cultures (last 7 days):         Last 24 Hours Medication List:   Current Facility-Administered Medications   Medication Dose Route Frequency Provider Last Rate   • acetaminophen  650 mg Oral Q6H PRN Carol Blancas MD     • atorvastatin  20 mg Oral Daily With Nichole Jones MD     • azithromycin  250 mg Oral Q24H Christen Gutierrez MD     • budesonide  0.5 mg Nebulization Q12H Ray Parra MD     • cyanocobalamin  1,000 mcg Oral Daily Carol Blancas MD     • docusate sodium  100 mg Oral BID Carol Blancas MD     • famotidine  20 mg Oral BID Carol Blancas MD     • formoterol  20 mcg Nebulization Q12H Ray Parra MD     • heparin (porcine)  5,000 Units Subcutaneous Q12H [address] Carol Blancas MD     • insulin lispro  1-5 Units Subcutaneous TID AC DELIA Lyn     • insulin lispro  1-5 Units Subcutaneous HS DELIA Lyn     • insulin lispro  3 Units Subcutaneous TID With Meals DELIA Lyn     • ipratropium  0.5 mg Nebulization Q6H Carol Blancas MD     • levalbuterol  1.25 mg Nebulization Q6H Carol Blancas MD     • melatonin  6 mg Oral HS Carol Blancas MD     • methylPREDNISolone sodium succinate  40 mg Intravenous Q12H [address] Carol Blancas MD     • potassium chloride  20 mEq Oral BID Carol Blancas MD     • tamsulosin  0.4 mg Oral Daily With Nichole Jones MD     • torsemide  40 mg Oral Daily Carol Blancas MD          Today, Patient Was Seen By: DELIA Albrecht    **Please Note: This note may have been constructed using a voice recognition system. **

## 2023-08-02 NOTE — RESPIRATORY THERAPY NOTE
Respiratory therapy note         Home Oxygen Qualifying Test     Patient name: Misha Johnson        : 1955   Date of Test:  2023  Diagnosis:    Home Oxygen Test:    **Medicare Guidelines require item(s) 1-5 on all ambulatory patients or 1 and 2 on non-ambulatory patients. 1. Baseline SPO2 on Room Air at rest 85 %   a. If <= 88% on Room Air add O2 via NC to obtain SpO2 >=88%. If LPM needed, document LPM 3 needed to reach =>88%    2. SPO2 during exertion on Room Air 82  %  a. During exertion monitor SPO2. If SPO2 increases >=89%, do not add supplemental oxygen    3. SPO2 on Oxygen at Rest 97 % at 3 LPM    4. SPO2 during exertion on Oxygen 94 % at 6 LPM    5. Test performed during exertion activity. [x]  Supplemental Home Oxygen is indicated. []  Client does not qualify for home oxygen.     Respiratory Additional Notes-   Lucrecia Palma, RT

## 2023-08-02 NOTE — RESPIRATORY THERAPY NOTE
Resp Care   08/02/23 0717   Inhalation Therapy Tx   $ Inhalation Therapy Performed Yes   $ Pulse Oximetry Spot Check Charge Completed   SpO2 100 %   Pre-Treatment Pulse 82   Pre-Treatment Respirations 25   Duration 30   Breath Sounds Pre-Treatment Bilateral Diminished   Breath Sounds Post-Treatment Bilateral Diminished   Post-Treatment Pulse 78   Post-Treatment Respirations 23   Delivery Source [employer]   Position Semi [employer]   Treatment Tolerance Tolerated well   Resp Comments pt found on bipap 14/6 40% fio2, spo2 is 99%, bs are diminished, udn tx given through bipap. will continue to monitor per resp protocol.

## 2023-08-02 NOTE — CASE MANAGEMENT
Case Management Discharge Planning Note    Patient name Arthur Duron.   Location [facility]  MRN [id number]  : 1955 Date 2023       Current Admission Date: 2023  Current Admission Diagnosis:Acute on chronic respiratory failure with hypoxia and hypercapnia    Patient Active Problem List    Diagnosis Date Noted   • Lung nodule 2023   • Elevated troponin 2023   • History of bladder cancer 2023   • Pulmonary cachexia due to COPD [facility])    • Chronic hypercapnia/KEVIN    • Metabolic encephalopathy    • End-stage COPD with acute exacerbation ([address]) 2023   • Palliative care patient 2023   • Alkalosis 2023   • Severe protein-calorie malnutrition  06/10/2023   • Pneumonia 2023   • Hyperlipidemia 2023   • COPD exacerbation ([address]) 2023   • Steroid-induced hyperglycemia 2023   • Physical deconditioning 2023   • Chronic anemia 2023   • SIRS (systemic inflammatory response syndrome) 2023   • Hyponatremia 2023   • Chronic HFrEF (heart failure with reduced ejection fraction)  2022   • Protein-calorie malnutrition ([address]) 2022   • Acute on chronic respiratory failure with hypoxia and hypercapnia  2022   • Pulmonary nodules/lesions, multiple 2022   • Prostate cancer ([address]) 2021   • BPH with obstruction/lower urinary tract symptoms 2021   • Goals of care, counseling/discussion 2021   • Chronic respiratory failure with hypoxia and hypercapnia ([address]) 2020   • Macrocytosis without anemia 2019   • Other insomnia 2019   • GERD (gastroesophageal reflux disease) 2017   • Nonobstructive atherosclerosis of coronary artery 2016   • Mood disorder ([address]) 2015   • Prediabetes 2015   • Osteoporosis 10/14/2014   • Vitamin D deficiency 10/14/2014   • Nonischemic cardiomyopathy ([address]) 2014   • Left bundle-branch block 2013   • Osteoarthritis 08/03/2013   • Obstructive sleep apnea 07/29/2013   • COPD exacerbation 07/18/2012      LOS (days): 2  Geometric Mean LOS (GMLOS) (days):   Days to GMLOS:     OBJECTIVE:  Risk of Unplanned Readmission Score: 51.8         Current admission status: Inpatient   Preferred Pharmacy:   [id number]  [address] [place] [address]  Phone: [phone] Fax: [phone]    Primary Care Provider: Sea Giron DO    Primary Insurance: BLUE CROSS  Secondary Insurance: MEDICARE    DISCHARGE DETAILS:    Pt recommended for a home d/c with [facility]. CM provided pt's s/o with a list of [facility] locations.

## 2023-08-02 NOTE — PHYSICAL THERAPY NOTE
PT cancel note       08/02/23 1030   PT Last Visit   PT Visit Date 08/02/23   Note Type   Note Type Cancelled Session   Cancel Reasons Medical status  (pt reported that he was having difficulty breathing and requested BiPaP started)     Sherren Barrio

## 2023-08-02 NOTE — ASSESSMENT & PLAN NOTE
Recently discharged 7/29 for COPD with acute exacerbation. At home, per patient, BiPAP machine has been "broken."  Placed on BiPAP in the ED for a few hours and patient reporting improvement in shortness of breath and wheezing. · Prior to most recent hospitalization was wearing 3L chronically. Upon discharge, he was told he would need 6 L nasal cannula at rest and 8 with ambulation/exertion at baseline as well as BiPAP nightly and as needed. His O2 company was able to provide appropriate equipment and he is able to go up to 10L if needed at home. · This admission has been requiring 3L at rest.   · Repeat home O2 eval prior to d/c to clarify o2 requirements, spoke to RT. · Patient also with issues with BIPAP at home, [facility] to go to home tomorrow to review Bipap with patient and address any issues . · Pulm following, plan for d/c tomorrow.    · CM following, plan for home with [facility] and outpatient pulm rehab (will need referral on d/c)  · Rest of plan as below

## 2023-08-02 NOTE — ASSESSMENT & PLAN NOTE
· Not interested in discussing further. Was made DNR/DNI on last admission however currently requests to be full code.

## 2023-08-03 ENCOUNTER — TELEPHONE (OUTPATIENT)
Dept: PULMONOLOGY | Facility: CLINIC | Age: 68
End: 2023-08-03

## 2023-08-03 VITALS
HEART RATE: 110 BPM | SYSTOLIC BLOOD PRESSURE: 122 MMHG | DIASTOLIC BLOOD PRESSURE: 79 MMHG | RESPIRATION RATE: 41 BRPM | OXYGEN SATURATION: 99 % | TEMPERATURE: 97.7 F

## 2023-08-03 DIAGNOSIS — J44.1 COPD EXACERBATION (HCC): Primary | ICD-10-CM

## 2023-08-03 LAB
ANION GAP SERPL CALCULATED.3IONS-SCNC: 4 MMOL/L
BUN SERPL-MCNC: 38 MG/DL (ref 5–25)
CALCIUM SERPL-MCNC: 9.1 MG/DL (ref 8.3–10.1)
CHLORIDE SERPL-SCNC: 87 MMOL/L (ref 96–108)
CO2 SERPL-SCNC: 42 MMOL/L (ref 21–32)
CREAT SERPL-MCNC: 0.97 MG/DL (ref 0.6–1.3)
ERYTHROCYTE [DISTWIDTH] IN BLOOD BY AUTOMATED COUNT: 12.5 % (ref 11.6–15.1)
GFR SERPL CREATININE-BSD FRML MDRD: 81 ML/MIN/1.73SQ M
GLUCOSE SERPL-MCNC: 338 MG/DL (ref 65–140)
GLUCOSE SERPL-MCNC: 349 MG/DL (ref 65–140)
GLUCOSE SERPL-MCNC: 399 MG/DL (ref 65–140)
HCT VFR BLD AUTO: 29.5 % (ref 36.5–49.3)
HGB BLD-MCNC: 9.1 G/DL (ref 12–17)
MAGNESIUM SERPL-MCNC: 2.1 MG/DL (ref 1.6–2.6)
MCH RBC QN AUTO: 31.4 PG (ref 26.8–34.3)
MCHC RBC AUTO-ENTMCNC: 30.8 G/DL (ref 31.4–37.4)
MCV RBC AUTO: 102 FL (ref 82–98)
PHOSPHATE SERPL-MCNC: 2.6 MG/DL (ref 2.3–4.1)
PLATELET # BLD AUTO: 226 THOUSANDS/UL (ref 149–390)
PMV BLD AUTO: 10 FL (ref 8.9–12.7)
POTASSIUM SERPL-SCNC: 4.3 MMOL/L (ref 3.5–5.3)
RBC # BLD AUTO: 2.9 MILLION/UL (ref 3.88–5.62)
SODIUM SERPL-SCNC: 133 MMOL/L (ref 135–147)
WBC # BLD AUTO: 8.69 THOUSAND/UL (ref 4.31–10.16)

## 2023-08-03 PROCEDURE — 94640 AIRWAY INHALATION TREATMENT: CPT

## 2023-08-03 PROCEDURE — 83735 ASSAY OF MAGNESIUM: CPT | Performed by: INTERNAL MEDICINE

## 2023-08-03 PROCEDURE — 94760 N-INVAS EAR/PLS OXIMETRY 1: CPT

## 2023-08-03 PROCEDURE — 82948 REAGENT STRIP/BLOOD GLUCOSE: CPT

## 2023-08-03 PROCEDURE — 99232 SBSQ HOSP IP/OBS MODERATE 35: CPT | Performed by: INTERNAL MEDICINE

## 2023-08-03 PROCEDURE — 80048 BASIC METABOLIC PNL TOTAL CA: CPT | Performed by: INTERNAL MEDICINE

## 2023-08-03 PROCEDURE — 99239 HOSP IP/OBS DSCHRG MGMT >30: CPT | Performed by: NURSE PRACTITIONER

## 2023-08-03 PROCEDURE — 94664 DEMO&/EVAL PT USE INHALER: CPT

## 2023-08-03 PROCEDURE — 85027 COMPLETE CBC AUTOMATED: CPT | Performed by: INTERNAL MEDICINE

## 2023-08-03 PROCEDURE — 97535 SELF CARE MNGMENT TRAINING: CPT

## 2023-08-03 PROCEDURE — 97530 THERAPEUTIC ACTIVITIES: CPT

## 2023-08-03 PROCEDURE — 84100 ASSAY OF PHOSPHORUS: CPT | Performed by: INTERNAL MEDICINE

## 2023-08-03 PROCEDURE — 94660 CPAP INITIATION&MGMT: CPT

## 2023-08-03 RX ORDER — PREDNISONE 20 MG/1
40 TABLET ORAL DAILY
Status: DISCONTINUED | OUTPATIENT
Start: 2023-08-03 | End: 2023-08-03 | Stop reason: HOSPADM

## 2023-08-03 RX ORDER — BLOOD SUGAR DIAGNOSTIC
STRIP MISCELLANEOUS
Qty: 100 EACH | Refills: 0 | Status: SHIPPED | OUTPATIENT
Start: 2023-08-03

## 2023-08-03 RX ORDER — BLOOD-GLUCOSE METER
KIT MISCELLANEOUS
Qty: 1 KIT | Refills: 0 | Status: SHIPPED | OUTPATIENT
Start: 2023-08-03

## 2023-08-03 RX ORDER — PEN NEEDLE, DIABETIC 32GX 5/32"
NEEDLE, DISPOSABLE MISCELLANEOUS
Qty: 100 EACH | Refills: 0 | Status: SHIPPED | OUTPATIENT
Start: 2023-08-03

## 2023-08-03 RX ORDER — INSULIN LISPRO 200 [IU]/ML
3 INJECTION, SOLUTION SUBCUTANEOUS
Qty: 6 ML | Refills: 0 | Status: SHIPPED | OUTPATIENT
Start: 2023-08-03

## 2023-08-03 RX ORDER — PREDNISONE 10 MG/1
TABLET ORAL
Qty: 26 TABLET | Refills: 0 | Status: SHIPPED | OUTPATIENT
Start: 2023-08-03

## 2023-08-03 RX ORDER — AZITHROMYCIN 250 MG/1
250 TABLET, FILM COATED ORAL 3 TIMES WEEKLY
Qty: 12 TABLET | Refills: 0 | Status: SHIPPED | OUTPATIENT
Start: 2023-08-07 | End: 2023-09-06

## 2023-08-03 RX ORDER — INSULIN GLARGINE 100 [IU]/ML
5 INJECTION, SOLUTION SUBCUTANEOUS EVERY MORNING
Status: DISCONTINUED | OUTPATIENT
Start: 2023-08-03 | End: 2023-08-03 | Stop reason: HOSPADM

## 2023-08-03 RX ORDER — PREDNISONE 5 MG/1
5 TABLET ORAL DAILY
Qty: 30 TABLET | Refills: 0
Start: 2023-08-08

## 2023-08-03 RX ORDER — GLUCOSAMINE HCL/CHONDROITIN SU 500-400 MG
CAPSULE ORAL
Qty: 100 EACH | Refills: 0 | Status: SHIPPED | OUTPATIENT
Start: 2023-08-03

## 2023-08-03 RX ORDER — LEVALBUTEROL INHALATION SOLUTION 1.25 MG/3ML
1.25 SOLUTION RESPIRATORY (INHALATION)
Qty: 360 ML | Refills: 0 | Status: SHIPPED | OUTPATIENT
Start: 2023-08-03

## 2023-08-03 RX ORDER — LANCETS 33 GAUGE
EACH MISCELLANEOUS
Qty: 100 EACH | Refills: 0 | Status: SHIPPED | OUTPATIENT
Start: 2023-08-03

## 2023-08-03 RX ADMIN — INSULIN LISPRO 4 UNITS: 100 INJECTION, SOLUTION INTRAVENOUS; SUBCUTANEOUS at 12:37

## 2023-08-03 RX ADMIN — IPRATROPIUM BROMIDE 0.5 MG: 0.5 SOLUTION RESPIRATORY (INHALATION) at 07:19

## 2023-08-03 RX ADMIN — FORMOTEROL FUMARATE DIHYDRATE 20 MCG: 20 SOLUTION RESPIRATORY (INHALATION) at 07:20

## 2023-08-03 RX ADMIN — IPRATROPIUM BROMIDE 0.5 MG: 0.5 SOLUTION RESPIRATORY (INHALATION) at 03:13

## 2023-08-03 RX ADMIN — DOCUSATE SODIUM 100 MG: 100 CAPSULE ORAL at 09:51

## 2023-08-03 RX ADMIN — POTASSIUM CHLORIDE 20 MEQ: 1500 TABLET, EXTENDED RELEASE ORAL at 09:51

## 2023-08-03 RX ADMIN — BUDESONIDE 0.5 MG: 0.5 INHALANT ORAL at 07:19

## 2023-08-03 RX ADMIN — LEVALBUTEROL HYDROCHLORIDE 1.25 MG: 1.25 SOLUTION RESPIRATORY (INHALATION) at 03:13

## 2023-08-03 RX ADMIN — LEVALBUTEROL HYDROCHLORIDE 1.25 MG: 1.25 SOLUTION RESPIRATORY (INHALATION) at 07:19

## 2023-08-03 RX ADMIN — TORSEMIDE 40 MG: 20 TABLET ORAL at 09:50

## 2023-08-03 RX ADMIN — CYANOCOBALAMIN TAB 500 MCG 1000 MCG: 500 TAB at 09:51

## 2023-08-03 RX ADMIN — INSULIN GLARGINE 5 UNITS: 100 INJECTION, SOLUTION SUBCUTANEOUS at 09:50

## 2023-08-03 RX ADMIN — FAMOTIDINE 20 MG: 20 TABLET, FILM COATED ORAL at 09:50

## 2023-08-03 RX ADMIN — PREDNISONE 40 MG: 20 TABLET ORAL at 09:50

## 2023-08-03 RX ADMIN — INSULIN LISPRO 4 UNITS: 100 INJECTION, SOLUTION INTRAVENOUS; SUBCUTANEOUS at 06:19

## 2023-08-03 RX ADMIN — INSULIN LISPRO 3 UNITS: 100 INJECTION, SOLUTION INTRAVENOUS; SUBCUTANEOUS at 09:51

## 2023-08-03 RX ADMIN — HEPARIN SODIUM 5000 UNITS: 5000 INJECTION INTRAVENOUS; SUBCUTANEOUS at 09:53

## 2023-08-03 RX ADMIN — INSULIN LISPRO 3 UNITS: 100 INJECTION, SOLUTION INTRAVENOUS; SUBCUTANEOUS at 12:37

## 2023-08-03 NOTE — OCCUPATIONAL THERAPY NOTE
Occupational Therapy Treatment Note      Herb Lewis.    8/3/2023    Principal Problem:    Acute on chronic respiratory failure with hypoxia and hypercapnia   Active Problems:    COPD exacerbation    Obstructive sleep apnea    GERD (gastroesophageal reflux disease)    Goals of care, counseling/discussion    Chronic HFrEF (heart failure with reduced ejection fraction)     SIRS (systemic inflammatory response syndrome)    Steroid-induced hyperglycemia    Hyperlipidemia    Severe protein-calorie malnutrition     Elevated troponin    History of bladder cancer    Lung nodule      Past Medical History:   Diagnosis Date    Acute metabolic encephalopathy 71/50/4930    Arthritis     Bladder mass     Cardiomyopathy (HCC)     Chest pain     COPD (chronic obstructive pulmonary disease) ([address])     COVID-19 virus infection 02/23/2023    CPAP (continuous positive airway pressure) dependence     Emphysema of lung (HCC)     Hypoxia     nocturnal    Left bundle branch block     Multiple pulmonary nodules     last assessed: 10/12/16    Pneumonia     Sleep apnea, obstructive     Smoker     Weight loss 08/29/2019       Past Surgical History:   Procedure Laterality Date    COLONOSCOPY      CYSTOSCOPY      CYSTOSCOPY  02/17/2021    AR BLADDER INSTILLATION ANTICARCINOGENIC AGENT N/A 1/3/2020    Procedure: INSTILLATION MITOMYCIN;  Surgeon: Mckenzie Aguilera MD;  [facility]: [facility];  Service: Urology    AR CYSTO W/REMOVAL OF LESIONS SMALL N/A 1/3/2020    Procedure: TRANSURETHRAL RESECTION OF BLADDER TUMOR (TURBT);   Surgeon: Mckenzie Aguilera MD;  [facility];  Service: Urology    AR CYSTOURETHROSCOPY WITH BIOPSY N/A 4/13/2021    Procedure: Shahnaz Bustillo;  Surgeon: Mckenzie Aguilera MD;  [facility];  Service: Urology    AR TRURL ELECTROSURG RESCJ PROSTATE BLEED COMPLETE N/A 4/13/2021    Procedure: TRANSURETHRAL RESECTION OF PROSTATE (TURP) BLADDER BIOPSY, FULGURATION;  Surgeon: Mckenzie Aguilera MD; [facility];  Service: Urology        08/03/23 0908   OT Last Visit   OT Visit Date 08/03/23   Note Type   Note Type Treatment   Pain Assessment   Pain Assessment Tool 0-10   Pain Score No Pain   Restrictions/Precautions   Weight Bearing Precautions Per Order No   Other Precautions Multiple lines;Telemetry;O2;Fall Risk  (5L O2)   Lifestyle   Autonomy I with ADLS and needs assist with IADLs -    Reciprocal Relationships supportive wife and son   Service to Others not currently working   Intrinsic Gratification building model cars   ADL   LB Dressing Assistance 4  Minimal Assistance   LB Dressing Deficit Setup;Steadying; Requires assistive device for steadying;Verbal cueing;Supervision/safety; Increased time to complete; Thread RLE into pants; Thread LLE into pants;Pull up over hips   LB Dressing Comments Attempted to don pants while seated EOB; S for threading over B/L LE's w/ Mod VC for encouragement. Pt then deferred pulling pants all the way up 2/2 external catheter in place and incontinence of bowels. Toileting Assistance  2  Maximal Assistance   Toileting Deficit Perineal hygiene   Toileting Comments Pt incontinent of bowel; required Max A for hygiene upon standing   Bed Mobility   Supine to Sit 5  Supervision   Additional items HOB elevated; Increased time required;Verbal cues   Sit to Supine Unable to assess   Additional Comments pt sat EOB w/ Fair sitting balance/trunk control. Pt needed increased time for seated rest break while EOB; reported feeling SOB after going from supine to sit; O2 remained on high 90s while on 5-6L O2   Transfers   Sit to Stand 4  Minimal assistance   Additional items Assist x 1; Increased time required;Verbal cues   Stand to Sit 4  Minimal assistance   Additional items Assist x 1; Increased time required;Verbal cues   Additional Comments RW for support; 2x   Functional Mobility   Functional Mobility 4  Minimal assistance   Additional Comments Pt took few small steps from EOB to chair w/ Min A x1; RW for support. Planned to go greater household distance however pt was fatigued after sitting EOB and standing to be cleaned from bowel mvmt. Additional items Rolling walker   Cognition   Overall Cognitive Status Impaired   Arousal/Participation Responsive; Cooperative   Attention Attends with cues to redirect   Orientation Level Oriented X4   Memory Decreased recall of precautions   Following Commands Follows one step commands without difficulty   Comments pt is cooperative; limited by fatigue. Family present by end of session (spouse and son)   Activity Tolerance   Activity Tolerance Patient limited by fatigue   Medical Staff Made Aware RN   Assessment   Assessment Patient participated in Skilled OT session 8/3/2023 with interventions consisting of ADL re training with the use of correct body mechnaics, Energy Conservation techniques, safety awareness and fall prevention techniques, therapeutic exercise to: increase functional use of BUEs, increase BUE muscle strength ,  therapeutic activities to: increase activity tolerance, increase standing tolerance time with unilateral UE support to complete sink level ADLs, increase dynamic sit/ stand balance during functional activity , increase postural control, increase trunk control and increase OOB/ sitting tolerance . Patient agreeable to OT treatment session, upon arrival patient was found supine in bed. In comparison to previous session, patient with improvements in EOB sitting tolerance, endurance, functional mobility, LB ADLS. Patient requiring verbal cues for correct technique, verbal cues for pacing thru activity steps and frequent rest periods. Patient continues to be functioning below baseline level, occupational performance remains limited secondary to factors listed above and increased risk for falls and injury. From OT standpoint, recommendation at time of d/c would be Short Term Rehab.    Patient to benefit from continued Occupational Therapy treatment while in the hospital to address deficits as defined above and maximize level of functional independence with ADLs and functional mobility. Plan   Treatment Interventions ADL retraining;Functional transfer training;UE strengthening/ROM; Endurance training;Cognitive reorientation;Patient/family training;Equipment evaluation/education; Compensatory technique education;Continued evaluation; Energy conservation; Activityengagement   Goal Expiration Date 08/15/23   OT Treatment Day 1   OT Frequency 2-3x/wk   Recommendation   OT Discharge Recommendation Post acute rehabilitation services   Additional Comments  The patient's raw score on the AM-PAC Daily Activity Inpatient Short Form is 19. A raw score of greater than or equal to 19 suggests the patient may benefit from discharge to home. Please refer to the recommendation of the Occupational Therapist for safe discharge planning   AM-PAC Daily Activity Inpatient   Lower Body Dressing 3   Bathing 3   Toileting 2   Upper Body Dressing 3   Grooming 4   Eating 4   Daily Activity Raw Score 19   Daily Activity Standardized Score (Calc for Raw Score >=11) 40.22   AM-PAC Applied Cognition Inpatient   Following a Speech/Presentation 3   Understanding Ordinary Conversation 4   Taking Medications 4   Remembering Where Things Are Placed or Put Away 4   Remembering List of 4-5 Errands 4   Taking Care of Complicated Tasks 3   Applied Cognition Raw Score 22   Applied Cognition Standardized Score 47.83   End of Consult   Education Provided Yes;Family or social support of family present for education by provider   Patient Position at End of Consult Bedside chair; All needs within reach   Nurse Communication Nurse aware of consult     Belkys Baker MS, OTR/L

## 2023-08-03 NOTE — PROGRESS NOTES
PULMONOLOGY PROGRESS NOTE     Name: Jaun Burciaga. Age & Sex: 79 y.o. male   MRN: [id number]  Unit/Bed#: [facility] 209-01   Encounter: [id number]    PATIENT INFORMATION     Name: Juan Burciaga. Age & Sex: 79 y.o. male   MRN: [id number]  Hospital Stay Days: 3    ASSESSMENT/PLAN     Assessment:   1. Very Severe COPD with Exacerbation  2. Chronic hypoxemic/hypercapnic respiratory failure- near baseline  3. KEVIN  4. 6mm right upper lung nodule  5. Malnutrition  6. HLD  7. GERD  8. HFrEF    Plan:  • He is near his baseline utilizing BPAP qhs and PRN for distress as well as 3L NC at rest.   • Stop solumedrol IV and Start prednisone taper of 40 mg and decrease by 10 mg every 3 days  • Was discharged on azithromycin m/w/f for anti-inflammatory effect in COPD  • Stable for discharge with adapt help home set up of non-invasive ventilator  • Would recommend providing him with food list that are low in salt to help improve his diet as he discusses eating baked chicken from [facility]. • Can follow-up on 6 mm right upper lung nodule in the outpatient setting, but he has overall poor medical prognosis for which hospice has been discussed extensively, but they are not interested at this time. SUBJECTIVE     Patient seen and examined. No acute events overnight. He states that he is breathing well without issue and is intent on going home at this time. He is urinating and having appropriate bowel movements. He denies significant respiratory distress. He discussed having pedal edema and asked why that is. We discussed given his various medical conditions, he is more prone to get swelling based on his diet. He explained that his diet consists of baked chicken at [employer]. I recommended that he monitor his salt and fluid intake per recommendation from his cardiologists. He is also concerned about his weight loss which we discussed is due in part to progression of his lung disease and poor dietary intake.  Recommended that he follow-up with his PCP and cardiologist to improve his dietary status. He has no further questions or concerns at this time. OBJECTIVE     Vitals:    23 2125 23 2334 23 0313 23 0345   BP:  104/76 (!) 84/69 110/70   BP Location:  Left arm     Pulse:  82 80 86   Resp:  (!) 23 22 (!) 23   Temp:  98.1 °F (36.7 °C) (!) 96.8 °F (36 °C)    TempSrc:  Axillary Axillary    SpO2: 99% 100% 100% 100%      Temperature:   Temp (24hrs), Av.9 °F (36.6 °C), Min:96.8 °F (36 °C), Max:98.9 °F (37.2 °C)    Temperature: (!) 96.8 °F (36 °C)  Intake & Output:  I/O        0701  / 0700  0701   0700  0701   0700    P. O. 878 746     Total Intake 878 746     Urine 1500 1100     Stool       Total Output 1500 1100     Net -[phone]            Unmeasured Urine Occurrence  1 x         Weights: There is no height or weight on file to calculate BMI. Weight (last 2 days)     None        Physical Exam  Constitutional:       General: He is awake. Appearance: He is cachectic. HENT:      Mouth/Throat:      Mouth: Mucous membranes are moist.      Pharynx: Oropharynx is clear. Uvula midline. Cardiovascular:      Rate and Rhythm: Normal rate and regular rhythm. Pulses: Normal pulses. Pulmonary:      Effort: Accessory muscle usage present. Breath sounds: Decreased breath sounds and wheezing present. Comments: Diffuse decreased breath sounds and expiratory wheezes  Abdominal:      General: Abdomen is flat. Palpations: Abdomen is soft. Tenderness: There is no abdominal tenderness. Musculoskeletal:      Right lower leg: No edema. Left lower leg: No edema. Neurological:      Mental Status: He is alert and oriented to person, place, and time. GCS: GCS eye subscore is 4. GCS verbal subscore is 5. GCS motor subscore is 6. Psychiatric:         Behavior: Behavior is cooperative. LABORATORY DATA     Labs:  I have personally reviewed pertinent reports. Results from last 7 days   Lab Units 08/03/23  0619 08/02/23  0602 08/01/23  0605 07/31/23  1121   WBC Thousand/uL 8.69 8.56 12.49* 20.09*   HEMOGLOBIN g/dL 9.1* 8.8* 9.3* 12.8   HEMATOCRIT % 29.5* 28.2* 30.0* 40.2   PLATELETS Thousands/uL 226 207 212 239   NEUTROS PCT %  --   --   --  93*   MONOS PCT %  --   --   --  5   MONO PCT %  --   --  1*  --    EOS PCT %  --   --  0 0      Results from last 7 days   Lab Units 08/03/23  0619 08/02/23  0602 08/01/23  0605   POTASSIUM mmol/L 4.3 4.0 4.4   CHLORIDE mmol/L 87* 89* 85*   CO2 mmol/L 42* 43* 45*   BUN mg/dL 38* 38* 38*   CREATININE mg/dL 0.97 0.79 0.77   CALCIUM mg/dL 9.1 9.0 9.6     Results from last 7 days   Lab Units 08/03/23  0619 08/02/23  0602 08/01/23  0605   MAGNESIUM mg/dL 2.1 2.3 2.5     Results from last 7 days   Lab Units 08/03/23  0619 08/02/23  0602 08/01/23  0605   PHOSPHORUS mg/dL 2.6 2.9 4.1                      ABG:       Micro:         IMAGING & DIAGNOSTIC TESTING     Radiology Results: I have personally reviewed pertinent reports. XR chest portable    Result Date: 8/1/2023  Impression: 1. Emphysematous changes with mild pulmonary vascular congestion. Patchy groundglass opacities and right upper lobe nodule identified on same day CT are not well seen on this exam. Please refer to that CT chest report of the same day for follow-up recommendations. Workstation performed: [id number]     CTA ED chest PE Study    Result Date: 7/31/2023  Impression: No pulmonary embolus. Moderate patchy bilateral groundglass opacity and consolidation, new from December 2022, infectious/inflammatory. New 6 mm right upper lobe nodule. Per 2017 Fleischner Society guidelines, recommend follow-up with a chest CT in 6 months. Moderate emphysema. This study was marked in Epic for immediate notification and follow-up. Workstation performed: [id number]     Other Diagnostic Testing: I have personally reviewed pertinent reports.     ACTIVE MEDICATIONS Current Facility-Administered Medications   Medication Dose Route Frequency   • acetaminophen (TYLENOL) tablet 650 mg  650 mg Oral Q6H PRN   • atorvastatin (LIPITOR) tablet 20 mg  20 mg Oral Daily With Dinner   • budesonide (PULMICORT) inhalation solution 0.5 mg  0.5 mg Nebulization Q12H   • cyanocobalamin (VITAMIN B-12) tablet 1,000 mcg  1,000 mcg Oral Daily   • docusate sodium (COLACE) capsule 100 mg  100 mg Oral BID   • famotidine (PEPCID) tablet 20 mg  20 mg Oral BID   • formoterol (PERFOROMIST) nebulizer solution 20 mcg  20 mcg Nebulization Q12H   • heparin (porcine) subcutaneous injection 5,000 Units  5,000 Units Subcutaneous Q12H [address]   • insulin lispro (HumaLOG) 100 units/mL subcutaneous injection 1-5 Units  1-5 Units Subcutaneous TID AC   • insulin lispro (HumaLOG) 100 units/mL subcutaneous injection 1-5 Units  1-5 Units Subcutaneous HS   • insulin lispro (HumaLOG) 100 units/mL subcutaneous injection 3 Units  3 Units Subcutaneous TID With Meals   • ipratropium (ATROVENT) 0.02 % inhalation solution 0.5 mg  0.5 mg Nebulization Q6H   • levalbuterol (XOPENEX) inhalation solution 1.25 mg  1.25 mg Nebulization Q6H   • melatonin tablet 6 mg  6 mg Oral HS   • methylPREDNISolone sodium succinate (Solu-MEDROL) injection 40 mg  40 mg Intravenous Q12H [address]   • potassium chloride (K-DUR,KLOR-CON) CR tablet 20 mEq  20 mEq Oral BID   • tamsulosin (FLOMAX) capsule 0.4 mg  0.4 mg Oral Daily With Dinner   • torsemide (DEMADEX) tablet 40 mg  40 mg Oral Daily           Chris Chen   MS-4

## 2023-08-03 NOTE — INCIDENTAL FINDINGS
The following findings require follow up:  Radiographic finding   Finding: New 6 mm right upper lobe nodule.  Per 2017 Fleischner Society guidelines, recommend follow-up with a chest CT in 6 months   Follow up required:  CT   Follow up should be done within 6 months     Please notify the following clinician to assist with the follow up:   Dr. Eveline Valentin

## 2023-08-03 NOTE — ASSESSMENT & PLAN NOTE
Recent A1c 5.7. Hyperglycemia noted with glucose in the 300s, steroid-induced. · Will give one time dose of lantus 5mg today sq  · Transition to oral steroids, d/c IV   · Suspect glucose to improve with transition to PO steroids today   · D/c home with humalog 3 units TID with meals for glucose > 200.    · Pt has a glucose monitor at home, instructed to monitor QID before meals and before bedtime   · F/u closely with PCP after discharge

## 2023-08-03 NOTE — CASE MANAGEMENT
Case Management Discharge Planning Note    Patient name Cachorro Rosales.   Location [facility]- MRN [id number]  : 1955 Date 8/3/2023       Current Admission Date: 2023  Current Admission Diagnosis:Acute on chronic respiratory failure with hypoxia and hypercapnia    Patient Active Problem List    Diagnosis Date Noted   • Lung nodule 2023   • Elevated troponin 2023   • History of bladder cancer 2023   • Pulmonary cachexia due to COPD [facility])    • Chronic hypercapnia/KEVIN    • Metabolic encephalopathy 3524   • End-stage COPD with acute exacerbation ([address]) 2023   • Palliative care patient 2023   • Alkalosis 2023   • Severe protein-calorie malnutrition  06/10/2023   • Pneumonia 2023   • Hyperlipidemia 2023   • COPD exacerbation ([address]) 2023   • Steroid-induced hyperglycemia 2023   • Physical deconditioning 2023   • Chronic anemia 2023   • SIRS (systemic inflammatory response syndrome) 2023   • Hyponatremia 2023   • Chronic HFrEF (heart failure with reduced ejection fraction)  2022   • Protein-calorie malnutrition ([address]) 2022   • Acute on chronic respiratory failure with hypoxia and hypercapnia  2022   • Pulmonary nodules/lesions, multiple 2022   • Prostate cancer ([address]) 2021   • BPH with obstruction/lower urinary tract symptoms 2021   • Goals of care, counseling/discussion 2021   • Chronic respiratory failure with hypoxia and hypercapnia ([address]) 2020   • Macrocytosis without anemia 2019   • Other insomnia 2019   • GERD (gastroesophageal reflux disease) 2017   • Nonobstructive atherosclerosis of coronary artery 2016   • Mood disorder ([address]) 2015   • Prediabetes 2015   • Osteoporosis 10/14/2014   • Vitamin D deficiency 10/14/2014   • Nonischemic cardiomyopathy ([address]) 2014   • Left bundle-branch block 2013   • Osteoarthritis 08/03/2013   • Obstructive sleep apnea 07/29/2013   • COPD exacerbation 07/18/2012      LOS (days): 3  Geometric Mean LOS (GMLOS) (days):   Days to GMLOS:     OBJECTIVE:  Risk of Unplanned Readmission Score: 55.69         Current admission status: Inpatient   Preferred Pharmacy:   [id number]  [address] [place] [id number]  Phone: [phone] Fax: [phone]    Primary Care Provider: Andrey Garcia DO    Primary Insurance: BLUE CROSS  Secondary Insurance: MEDICARE    DISCHARGE DETAILS:    Pt d/c home today with family.    Pt will follow up with OP Pulm rehab

## 2023-08-03 NOTE — ASSESSMENT & PLAN NOTE
· Noted on CTA--New 6 mm right upper lobe nodule. Per 2017 Fleischner Society guidelines, recommend follow-up with a chest CT in 6 months.   · F/u outpatient with pulm after discharge for survelliance

## 2023-08-03 NOTE — TELEPHONE ENCOUNTER
Patients wife called in regarding the patient stating that when he was in the hospital they took him off the duo-neb but the patient feels like that medication really helped him. The wife wants to know if he should stay off the duo-neb or can he continue taking it. Please advise.

## 2023-08-03 NOTE — PLAN OF CARE
Problem: Nutrition/Hydration-ADULT  Goal: Nutrient/Hydration intake appropriate for improving, restoring or maintaining nutritional needs  Description: Monitor and assess patient's nutrition/hydration status for malnutrition. Collaborate with interdisciplinary team and initiate plan and interventions as ordered. Monitor patient's weight and dietary intake as ordered or per policy. Utilize nutrition screening tool and intervene as necessary. Determine patient's food preferences and provide high-protein, high-caloric foods as appropriate.      INTERVENTIONS:  - Monitor oral intake, urinary output, labs, and treatment plans  - Assess nutrition and hydration status and recommend course of action  - Evaluate amount of meals eaten  - Assist patient with eating if necessary   - Allow adequate time for meals  - Recommend/ encourage appropriate diets, oral nutritional supplements, and vitamin/mineral supplements  - Order, calculate, and assess calorie counts as needed  - Recommend, monitor, and adjust tube feedings and TPN/PPN based on assessed needs  - Assess need for intravenous fluids  - Provide specific nutrition/hydration education as appropriate  - Include patient/family/caregiver in decisions related to nutrition  Outcome: Progressing     Problem: SAFETY ADULT  Goal: Patient will remain free of falls  Description: INTERVENTIONS:  - Educate patient/family on patient safety including physical limitations  - Instruct patient to call for assistance with activity   - Consult OT/PT to assist with strengthening/mobility   - Keep Call bell within reach  - Keep bed low and locked with side rails adjusted as appropriate  - Keep care items and personal belongings within reach  - Initiate and maintain comfort rounds  - Make Fall Risk Sign visible to staff  - Offer Toileting every 2 Hours, in advance of need  - Initiate/Maintain bed alarm  - Obtain necessary fall risk management equipment:   - Apply yellow socks and bracelet for high fall risk patients  - Consider moving patient to room near nurses station  Outcome: Progressing  Goal: Maintain or return to baseline ADL function  Description: INTERVENTIONS:  -  Assess patient's ability to carry out ADLs; assess patient's baseline for ADL function and identify physical deficits which impact ability to perform ADLs (bathing, care of mouth/teeth, toileting, grooming, dressing, etc.)  - Assess/evaluate cause of self-care deficits   - Assess range of motion  - Assess patient's mobility; develop plan if impaired  - Assess patient's need for assistive devices and provide as appropriate  - Encourage maximum independence but intervene and supervise when necessary  - Involve family in performance of ADLs  - Assess for home care needs following discharge   - Consider OT consult to assist with ADL evaluation and planning for discharge  - Provide patient education as appropriate  Outcome: Progressing     Problem: MOBILITY - ADULT  Goal: Maintain or return to baseline ADL function  Description: INTERVENTIONS:  -  Assess patient's ability to carry out ADLs; assess patient's baseline for ADL function and identify physical deficits which impact ability to perform ADLs (bathing, care of mouth/teeth, toileting, grooming, dressing, etc.)  - Assess/evaluate cause of self-care deficits   - Assess range of motion  - Assess patient's mobility; develop plan if impaired  - Assess patient's need for assistive devices and provide as appropriate  - Encourage maximum independence but intervene and supervise when necessary  - Involve family in performance of ADLs  - Assess for home care needs following discharge   - Consider OT consult to assist with ADL evaluation and planning for discharge  - Provide patient education as appropriate  Outcome: Progressing

## 2023-08-03 NOTE — ASSESSMENT & PLAN NOTE
SIRS versus sepsis POA as evidenced by leukocytosis, tachypnea, tachycardia. · Likely reactive due to presenting acute/chronic issues- currently afebrile  · Chest x-ray with mild pulmonary vascular congestion, emphysematous changes. · CTA chest negative for PE. Patchy bilateral groundglass opacities and consolidation infectious versus inflammatory. · Procalcitonin is mildly elevated at 0.65, unclear significance.   · Leukocytosis resolved  · Hold off on antibiotics for now and monitor clinically

## 2023-08-03 NOTE — PLAN OF CARE
Problem: OCCUPATIONAL THERAPY ADULT  Goal: Performs self-care activities at highest level of function for planned discharge setting. See evaluation for individualized goals. Description: Treatment Interventions: ADL retraining, Functional transfer training, UE strengthening/ROM, Endurance training, Cognitive reorientation, Patient/family training, Equipment evaluation/education, Compensatory technique education, Continued evaluation, Energy conservation, Activityengagement          See flowsheet documentation for full assessment, interventions and recommendations. Outcome: Progressing  Note: Limitation: Decreased ADL status, Decreased UE strength, Decreased Safe judgement during ADL, Decreased cognition, Decreased endurance, Decreased self-care trans, Decreased high-level ADLs  Prognosis: Fair  Assessment: Patient participated in Skilled OT session 8/3/2023 with interventions consisting of ADL re training with the use of correct body mechnaics, Energy Conservation techniques, safety awareness and fall prevention techniques, therapeutic exercise to: increase functional use of BUEs, increase BUE muscle strength ,  therapeutic activities to: increase activity tolerance, increase standing tolerance time with unilateral UE support to complete sink level ADLs, increase dynamic sit/ stand balance during functional activity , increase postural control, increase trunk control and increase OOB/ sitting tolerance . Patient agreeable to OT treatment session, upon arrival patient was found supine in bed. In comparison to previous session, patient with improvements in EOB sitting tolerance, endurance, functional mobility, LB ADLS. Patient requiring verbal cues for correct technique, verbal cues for pacing thru activity steps and frequent rest periods. Patient continues to be functioning below baseline level, occupational performance remains limited secondary to factors listed above and increased risk for falls and injury. From OT standpoint, recommendation at time of d/c would be Short Term Rehab. Patient to benefit from continued Occupational Therapy treatment while in the hospital to address deficits as defined above and maximize level of functional independence with ADLs and functional mobility. OT Discharge Recommendation: Post acute rehabilitation services          Problem: OCCUPATIONAL THERAPY ADULT  Goal: Performs self-care activities at highest level of function for planned discharge setting. See evaluation for individualized goals. Description: Treatment Interventions: ADL retraining, Functional transfer training, UE strengthening/ROM, Endurance training, Cognitive reorientation, Patient/family training, Equipment evaluation/education, Compensatory technique education, Continued evaluation, Energy conservation, Activityengagement          See flowsheet documentation for full assessment, interventions and recommendations. Outcome: Progressing  Note: Limitation: Decreased ADL status, Decreased UE strength, Decreased Safe judgement during ADL, Decreased cognition, Decreased endurance, Decreased self-care trans, Decreased high-level ADLs  Prognosis: Fair  Assessment: Patient participated in Skilled OT session 8/3/2023 with interventions consisting of ADL re training with the use of correct body mechnaics, Energy Conservation techniques, safety awareness and fall prevention techniques, therapeutic exercise to: increase functional use of BUEs, increase BUE muscle strength ,  therapeutic activities to: increase activity tolerance, increase standing tolerance time with unilateral UE support to complete sink level ADLs, increase dynamic sit/ stand balance during functional activity , increase postural control, increase trunk control and increase OOB/ sitting tolerance . Patient agreeable to OT treatment session, upon arrival patient was found supine in bed.   In comparison to previous session, patient with improvements in EOB sitting tolerance, endurance, functional mobility, LB ADLS. Patient requiring verbal cues for correct technique, verbal cues for pacing thru activity steps and frequent rest periods. Patient continues to be functioning below baseline level, occupational performance remains limited secondary to factors listed above and increased risk for falls and injury. From OT standpoint, recommendation at time of d/c would be Short Term Rehab. Patient to benefit from continued Occupational Therapy treatment while in the hospital to address deficits as defined above and maximize level of functional independence with ADLs and functional mobility.      OT Discharge Recommendation: Post acute rehabilitation services        Rufino Jefferson MS, OTR/L

## 2023-08-03 NOTE — ASSESSMENT & PLAN NOTE
Recently discharged 7/29 for COPD with acute exacerbation. At home, per patient, BiPAP machine has been "broken."  Placed on BiPAP in the ED for a few hours and patient reporting improvement in shortness of breath and wheezing. · Prior to most recent hospitalization was wearing 3L chronically. Upon discharge, he was told he would need 6 L nasal cannula at rest and 8 with ambulation/exertion at baseline as well as BiPAP nightly and as needed. His [employer] company was able to provide appropriate equipment and he is able to go up to 10L if needed at home. · This admission has been requiring 3L at rest and 6L with exertion per RT report   · Patient also with issues with BIPAP at home, [facility] to go to home today to review Bipap with patient and address any issues .    · Pulm following, plan for d/c today on steroid taper   · CM following, plan for home with [address] and outpatient pulm rehab (will need referral on d/c)  · Rest of plan as below

## 2023-08-03 NOTE — DISCHARGE SUMMARY
[address] [facility]  Discharge- Andrei Ugarte. 1955, 79 y.o. male MRN: [id number]  [facility]: [id number]  Primary Care Provider: Saloni La DO   Date and time admitted to hospital: 7/31/2023 10:50 AM    * Acute on chronic respiratory failure with hypoxia and hypercapnia   Assessment & Plan  Recently discharged 7/29 for COPD with acute exacerbation. At home, per patient, BiPAP machine has been "broken."  Placed on BiPAP in the ED for a few hours and patient reporting improvement in shortness of breath and wheezing. · Prior to most recent hospitalization was wearing 3L chronically. Upon discharge, he was told he would need 6 L nasal cannula at rest and 8 with ambulation/exertion at baseline as well as BiPAP nightly and as needed. His [employer] company was able to provide appropriate equipment and he is able to go up to 10L if needed at home. · This admission has been requiring 3L at rest and 6L with exertion per RT report   · Patient also with issues with BIPAP at home, [facility] to go to home today to review Bipap with patient and address any issues . · Pulm following, plan for d/c today on steroid taper   · CM following, plan for home with [facility] [address] [address] and outpatient pulm rehab (will need referral on d/c)  · Rest of plan as below    COPD exacerbation  Assessment & Plan  Patient with end-stage COPD. Has been on prednisone tapering course outpatient from recent hospitalization/discharge. · IV Solu-Medrol transitioned to PO prednisone taper today, taper by 10mg every three days and resume pred 5mg daily when finished. · Azithro added, will continue on d/c 250mg MWF. · Continue Pulmicort/Perforomist  · Respiratory protocol  · Goals of care have been discussed in the past given end-stage COPD with recurrent hospitalization and frequent exacerbations, most recently during hospitalization end of July however patient declines hospice.      Steroid-induced hyperglycemia  Assessment & Plan  Recent A1c 5.7. Hyperglycemia noted with glucose in the 300s, steroid-induced. · Will give one time dose of lantus 5mg today sq  · Transition to oral steroids, d/c IV   · Suspect glucose to improve with transition to PO steroids today   · D/c home with humalog 3 units TID with meals for glucose > 200. · Pt has a glucose monitor at home, instructed to monitor QID before meals and before bedtime   · F/u closely with PCP after discharge     Lung nodule  Assessment & Plan  · Noted on CTA--New 6 mm right upper lobe nodule. Per 2017 Fleischner Society guidelines, recommend follow-up with a chest CT in 6 months. · F/u outpatient with pulm after discharge for survelliance    History of bladder cancer  Assessment & Plan  · No acute issues. · Status post TURBT and mitomycin instillation  · Outpatient follow-up    Elevated troponin  Assessment & Plan  · Troponin peak of 50 with a subsequent downtrend  · Likely non-MI troponin elevation in the setting of above issues  · No chest pain    Severe protein-calorie malnutrition   Assessment & Plan  BMI of 17.67 in the setting of chronic illness coupled with decreased appetite/oral intake and evidence of muscle wasting/atrophy and exam  · Nutrition follow     Hyperlipidemia  Assessment & Plan  · Continue statin    SIRS (systemic inflammatory response syndrome)  Assessment & Plan  SIRS versus sepsis POA as evidenced by leukocytosis, tachypnea, tachycardia. · Likely reactive due to presenting acute/chronic issues- currently afebrile  · Chest x-ray with mild pulmonary vascular congestion, emphysematous changes. · CTA chest negative for PE. Patchy bilateral groundglass opacities and consolidation infectious versus inflammatory. · Procalcitonin is mildly elevated at 0.65, unclear significance.   · Leukocytosis resolved  · Hold off on antibiotics for now and monitor clinically    Chronic HFrEF (heart failure with reduced ejection fraction) Assessment & Plan  Last EF of 20% in May 2023  · Continue home dose of torsemide with potassium supplementation. · Does not appear to be on beta-blocker at baseline. · Low-sodium diet with fluid restriction  · Previously deemed by cardiology as not a candidate for advanced heart failure therapies    Goals of care, counseling/discussion  Assessment & Plan  · Not interested in discussing further. Was made DNR/DNI on last admission however currently requests to be full code. GERD (gastroesophageal reflux disease)  Assessment & Plan  · Continue Pepcid    Obstructive sleep apnea  Assessment & Plan  · BIPAP [facility]      Medical Problems     Resolved Problems  Date Reviewed: 8/3/2023   None       Discharging Physician / Practitioner: DELIA Lancaster  PCP: Stanley Moralez DO  Admission Date:   Admission Orders (From admission, onward)     Ordered        07/31/23 [address]  Once                      Discharge Date: 08/03/23    Consultations During Hospital Stay:  · Pulmonary     Procedures Performed:   · Chest xray 8/1/23- Emphysematous changes with mild pulmonary vascular congestion. Patchy groundglass opacities and right upper lobe nodule identified on same day CT are not well seen on this exam. Please refer to that CT chest report of the same day for follow-up recommendations. · 7/31/23 CT chest- No pulmonary embolus. Moderate patchy bilateral groundglass opacity and consolidation, new from December 2022, infectious/inflammatory. New 6 mm right upper lobe nodule. Per 2017 Fleischner Society guidelines, recommend follow-up with a chest CT in 6 months. Moderate emphysema. Significant Findings / Test Results:   · COPD exacerbation, pulmonary nodule, hypergylcemia     Incidental Findings:   · Pulmonary nodule    · I reviewed the above mentioned incidental findings with the patient and/or family and they expressed understanding.     Test Results Pending at Discharge (will require follow up):   · None      Outpatient Tests Requested:  · CT chest     Complications:  None     Reason for Admission: respiratory distress    Hospital Course:   Cosme Bateman is a 79 y.o. male patient who originally presented to the hospital on 7/31/2023 due to worsening shortness of breath and weakness over the last few days. The patient came to the emergency room reporting that his BiPAP machine was broke at home. The patient was placed on BiPAP in the emergency room and improved and was seen by pulmonary and started on IV steroids. Patient return to the emergency room less than 48 hours after he was discharged from previous admission. The patient will be discharged on a prednisone taper starting at 40 mg and taper by 10 mg every 3 days. The patient will continue with budesonide and formoterol nebs every 12 hours with standing Xopenex and Atrovent. The patient will start azithromycin 250 mg 3 times a week. Pulmonary reached out to [facility] health representative and they arranged for them to come to his home today to discuss his BiPAP and make sure that it is functioning appropriately. The patient was noted to have a lung nodule which can be followed as outpatient. The patient was noted to have steroid-induced hyperglycemia. His A1c was noted at 5.7 in July. The patient's blood sugars were elevated while on IV steroids and he was given a one-time dose of Lantus 5 units prior to discharge. Is recommended that the patient use mealtime coverage with Humalog 3 units for blood sugar greater than 200. I suspect that the patient's blood sugars will improve with titration of steroids. A new glucometer will be sent to his pharmacy and he will be provided with diabetic education prior to discharge. Please see above list of diagnoses and related plan for additional information. Condition at Discharge: fair    Discharge Day Visit / Exam:   Subjective:  Pt sitting in the chair.  Denies any complaints at this time and reports breathing is at his baseline. requesting discharge home. Vitals: Blood Pressure: 103/66 (08/03/23 0821)  Pulse: 84 (08/03/23 0821)  Temperature: 98 °F (36.7 °C) (08/03/23 0821)  Temp Source: Oral (08/03/23 8186)  Respirations: (!) 26 (08/03/23 0821)  SpO2: 100 % (08/03/23 0821)  Exam:   Physical Exam  Constitutional:       General: He is not in acute distress. Appearance: He is cachectic. He is ill-appearing. Cardiovascular:      Rate and Rhythm: Normal rate and regular rhythm. Pulses: Normal pulses. Heart sounds: Normal heart sounds. No murmur heard. Pulmonary:      Breath sounds: Wheezing present. Comments: Diminished. 6LNC  Abdominal:      General: Bowel sounds are normal. There is no distension. Palpations: Abdomen is soft. Tenderness: There is no abdominal tenderness. Musculoskeletal:         General: No swelling or tenderness. Skin:     General: Skin is warm and dry. Findings: No erythema or rash. Neurological:      General: No focal deficit present. Mental Status: He is alert. Mental status is at baseline. Psychiatric:         Attention and Perception: Attention normal.         Mood and Affect: Mood normal.        Discussion with Family: Updated  (wife and son) at bedside. Discharge instructions/Information to patient and family:   See after visit summary for information provided to patient and family. Provisions for Follow-Up Care:  See after visit summary for information related to follow-up care and any pertinent home health orders. Disposition:   Home    Planned Readmission: no but high risk for readmission      Discharge Statement:  I spent 45 minutes discharging the patient. This time was spent on the day of discharge. I had direct contact with the patient on the day of discharge.  Greater than 50% of the total time was spent examining patient, answering all patient questions, arranging and discussing plan of care with patient as well as directly providing post-discharge instructions. Additional time then spent on discharge activities. Discharge Medications:  See after visit summary for reconciled discharge medications provided to patient and/or family.       **Please Note: This note may have been constructed using a voice recognition system**

## 2023-08-03 NOTE — DISCHARGE INSTR - AVS FIRST PAGE
Dear Brooke Jesus.,     It was our pleasure to care for you here at [facility].  It is our hope that we were always able to meet and exceed the expected standards for your care during your stay. You were hospitalized due to COPD exacerbation . You were cared for on the 2nd floor under the service of DELIA Meadows with the [facility] who covers for your primary care physician (PCP), Josefina Ontiveros DO, while you were hospitalized. If you have any questions or concerns related to this hospitalization, you may contact us at [phone]. For follow up, we recommend that you follow up with your primary care physician. Please review this entire discharge summary as additional general instructions may be provided later as well. However, at this time we provide for you here, the most important instructions / recommendations at discharge:     As stated in the follow up appointments section of this After Visit Summary, Please call to arrange a follow up with the Pulmonologist (lung doctor) for outpatient follow-up, even if feeling better. Part of the plan of care will be to avoid additional flare ups of the COPD in the future. Continue to take the prednisone steroid taper as indicated on the discharge medication list.  Please note that the dose is a tapering dose that starts off higher and gets to smaller and smaller doses as you go along. Initially, you will need multiple pills to equal the daily dose recommended (for example if the pills are 10 mg pills and your initial dose is 40 mg, then 4 pills would be needed). Please look carefully at the medication instructions. Other Medications: you will be discharged home on insulin due to high blood sugars from the steroids. Check blood sugar before meals and if greater than 200 take 3 units of insulin. If less than 200 or if you will not be eating do not take insulin.  If blood sugar is greater than 300 call your PCP for directions on how to use insulin. I suspect you will only need the insulin for a few days until tapered off of steroids. Additional Outpatient Testing- repeat CT chest for surveillance on lung nodule  Oxygen: you will be discharged on LTAC, [facility] within [facility] with rest and 6LNC with ambulation. Your o2 needs may change and need to be monitored with  pulmonary after discharge. Smoking Cessation: no smoking or exposure to smoke is recommended  If you begin to notice wheezing or begin to get more short of breath again, it is important that you call the lung doctor (or your PCP) as soon as possible to avoid symptoms getting so bad that you would require a rehospitalization.     Sincerely,     DELIA Talavera

## 2023-08-03 NOTE — ASSESSMENT & PLAN NOTE
Patient with end-stage COPD. Has been on prednisone tapering course outpatient from recent hospitalization/discharge. · IV Solu-Medrol transitioned to PO prednisone taper today, taper by 10mg every three days and resume pred 5mg daily when finished. · Azithro added, will continue on d/c 250mg MWF. · Continue Pulmicort/Perforomist  · Respiratory protocol  · Goals of care have been discussed in the past given end-stage COPD with recurrent hospitalization and frequent exacerbations, most recently during hospitalization end of July however patient declines hospice.

## 2023-08-04 RX ORDER — ALBUTEROL SULFATE 90 UG/1
2 AEROSOL, METERED RESPIRATORY (INHALATION) EVERY 4 HOURS PRN
Qty: 18 G | Refills: 5 | Status: SHIPPED | OUTPATIENT
Start: 2023-08-04

## 2023-08-04 NOTE — UTILIZATION REVIEW
NOTIFICATION OF ADMISSION DISCHARGE   This is a Notification of Discharge from [address] [place] [address]. Please be advised that this patient has been discharge from our facility. Below you will find the admission and discharge date and time including the patient’s disposition. UTILIZATION REVIEW CONTACT:  [facility]  Utilization   Network Utilization Review Department  Phone: [phone] x carefully listen to the prompts. All voicemails are confidential.  Email: [email or web address]     ADMISSION INFORMATION  PRESENTATION DATE: 7/31/2023 10:50 AM  OBERVATION ADMISSION DATE:  INPATIENT ADMISSION DATE: 7/31/23  3:40 PM   DISCHARGE DATE: 8/3/2023  1:48 PM   DISPOSITION:Home/Self Care    IMPORTANT INFORMATION:  Send all requests for admission clinical reviews, approved or denied determinations and any other requests to dedicated fax number below belonging to the campus where the patient is receiving treatment.  List of dedicated fax numbers:  [place] DENIALS (Administrative/Medical Necessity) [phone] [address] [facility] (Maternity/NICU/Pediatrics) [phone]   [facility] [phone]   [facility] [phone] 16[address] [facility] [phone]   [address] [place] [address] [phone]   [facility] [phone]   [address] [place] [address] [address] [place] [address] [phone]   [address] [place] [address] [phone] 3[address] [facility] [phone]   [address] [facility] [address] [facility] [phone]

## 2023-08-04 NOTE — TELEPHONE ENCOUNTER
I spoke with Micheal Steel and his wife. They were sent home with Xopenex/Atrovent from the pharmacy after hospital D/C. I advised them to continue with Perforomist/budesonide BID and Xopenex/Atrovent 3-4 times daily given they have the supply. He can then return to his Duonebs. They are aware that Duonebs and Xopenex/Atrovent are interchangeable. I also sent albuterol MDI to his pharmacy. All questions answered.

## 2023-08-08 ENCOUNTER — RA CDI HCC (OUTPATIENT)
Dept: OTHER | Facility: HOSPITAL | Age: 68
End: 2023-08-08

## 2023-08-08 PROCEDURE — G0180 MD CERTIFICATION HHA PATIENT: HCPCS | Performed by: FAMILY MEDICINE

## 2023-08-08 NOTE — PROGRESS NOTES
[address] [facility] coding opportunities       Chart reviewed, no opportunity found: CHART REVIEWED, NO OPPORTUNITY FOUND        Patients Insurance        Commercial Insurance: Noe Samano

## 2023-08-10 NOTE — PROGRESS NOTES
Advanced Heart Failure / Pulmonary Hypertension Outpatient Visit    Sandra Villalta. 79 y.o. male   MRN: [id number]  Encounter: [id number]    Assessment:  Patient Active Problem List    Diagnosis Date Noted   • Lung nodule 08/01/2023   • Elevated troponin 07/31/2023   • History of bladder cancer 07/31/2023   • Pulmonary cachexia due to COPD [facility])    • Chronic hypercapnia/KEVIN 45/25/7577   • Metabolic encephalopathy 45/56/7419   • End-stage COPD with acute exacerbation ([address]) 07/19/2023   • Palliative care patient 06/13/2023   • Alkalosis 06/12/2023   • Severe protein-calorie malnutrition  06/10/2023   • Hyperlipidemia 05/02/2023   • COPD exacerbation ([address]) 04/28/2023   • Steroid-induced hyperglycemia 03/30/2023   • Physical deconditioning 02/21/2023   • Chronic anemia 02/17/2023   • SIRS (systemic inflammatory response syndrome) 02/17/2023   • Hyponatremia 02/17/2023   • Chronic HFrEF (heart failure with reduced ejection fraction)  09/12/2022   • Protein-calorie malnutrition ([address]) 08/27/2022   • Acute on chronic respiratory failure with hypoxia and hypercapnia  03/27/2022   • Pulmonary nodules/lesions, multiple 03/27/2022   • Prostate cancer ([address]) 05/24/2021   • BPH with obstruction/lower urinary tract symptoms 04/13/2021   • Goals of care, counseling/discussion 02/25/2021   • Chronic respiratory failure with hypoxia and hypercapnia ([address]) 11/19/2020   • Macrocytosis without anemia 05/23/2019   • Other insomnia 02/18/2019   • GERD (gastroesophageal reflux disease) 01/05/2017   • Nonobstructive atherosclerosis of coronary artery 02/25/2016   • Mood disorder ([address]) 08/18/2015   • Prediabetes 02/19/2015   • Osteoporosis 10/14/2014   • Vitamin D deficiency 10/14/2014   • Nonischemic cardiomyopathy ([address]) 09/26/2014   • Left bundle-branch block 08/03/2013   • Osteoarthritis 08/03/2013   • Obstructive sleep apnea 07/29/2013   • COPD exacerbation 07/18/2012       Today's Plan:  • Continue current medications.    • Reviewed sodium and fluid intake guidelines again today. Encouraged Ensure shakes for protein intake. • Multiple recent hospitalizations for COPD exacerbations. • Not a candidate for advanced heart failure therapies. Not looking to pursue invasive measures at this time. • 2 week follow up with HF AP, 4-6 weeks with Dr. Akila Lozano. Labs ordered to be done before next visit. Plan:  Chronic HFrEF; LVEF 20%; NYHA III; ACC/AHA Stage C              Etiology: Nonischemic cardiomyopathy.  Possible dyssynchrony from chronic LBBB.  Despite QRS only being 120 ms, echo shows significant dyssynchrony. Recent drop in EF likely related to stress myopathy with acute exacerbations of COPD. Weighed 115 lb on 5/10, 105 lb on 5/16/23, 104 lb 5/18, 103 lbs, 96 lbs. Today, not able to be weighed. Reports 92-93 lbs on home scale.      TTE 5/1/23: LVEF 20%. Severe global hypokinesis with regional variation. RV cavity size normal, systolic function normal. Trace MR, TR.   TTE 2/21/23: LVEF 30%. LVIDd 6.6cm. severe global hypokinesis. Grade I DD. RV cavity size and systolic function normal. Mild TR. TTE 2/12/2023: LVEF 20-25%.  Severe global hypokinesis with regional variation.  Grade 1 DD.  Abnormal septal motion consistent with LBBB.  RV cavity mildly dilated, normal systolic function.  Mild MR, mild TR.  RVSP 38.  Dilated IVC.   TTE 8/26/2022: LVEF 40%.  RV cavity mildly dilated.  Systolic function normal.  Mild MR, TR.  Normal IVC.     Neurohormonal Blockade:  --Beta Blocker: no  --ARNi / ACEi / Elenita Mires 12.5 mg QD, off   --Aldosterone Antagonist: no   --SGLT2 Inhibitor: no  --Home Diuretic: torsemide 40 mg QD     Sudden Cardiac Death Risk Reduction:  --ICD: LVEF newly reduced to 20-25%.     Electrical Resynchronization:  --Candidacy for BiV device: QRSd 120ms, chronic LBBB with dyssynchrony on echocardiogram.     Advanced Therapies (if appropriate): Not appropriate at this time, particularly considering advanced lung disease.        COPD (end stage)  Former smoker; 105 pack years, quit in 2012. Severe disease, follows closely with pulm.  On home oxygen. Multiple hospitalizations with acute COPD exacerbations   Management per pulm. COVID-19  Tested positive on 2/22/23.   Nonobstructive CAD  Has coronary calcium on CT  On aspirin and statin  Hyperlipidemia  Continue statin  LDL 75 8/2021  KEVIN   On BiPAP nightly  GOC. Introduced to palliative care team this past admit. Has seen them recently outpatient. HPI:   Dat Hairston Jr. is a 74year-old male who presented to [facility] on 2/12/2023 with an acute COPD exacerbation requiring intubation. Extubated 2/15/23.  PMH includes nonischemic cardiomyopathy, chronic systolic and diastolic heart failure, nonobstructive CAD, hyperlipidemia, severe COPD, KEVIN.  Was previously admitted in 8/2022 for acute decompensated heart failure and COPD exacerbation.  LVEF 40% at that time, down from prior of 45 to 50%, though previously EF had been 35% for years.  Started on Lasix at that time. Gray Hawleyson been euvolemic, though blood pressures have made it difficult to start/titrate up on GDMT.  Recently saw Dr. Cheryl Dudley in the office in December, at which time his Lasix was shifted to as needed and adequate oral intake and hydration were encouraged.     He was reportedly short of breath for 2 days prior to presentation, with hallucinations.  His wife called 911 and EMS intubated him in the field. [facility] was found to have an elevated procalcitonin on admission and started on azithromycin and ceftriaxone.  He was given IV Lasix with good urine output.  He was weaned off the ventilator and extubated on 2/15.  Maintained on BiPAP overnight and has been receiving scheduled nebulizers with aggressive pulmonary hygiene.  TTE on admission with LVEF 20 to 25%, down from 40% in 8/2022.      He is also followed by pulmonology, who recommended consideration for home hospice for severe Derrill Kate was seen by palliative care while inpatient here, and patient and family elected to make code status level 3 DNR/DNI.  Pulmonology has been following him as well and has been managing steroids, bronchodilators, and nebulizer therapy. Per TH: 3/7/23. Presents for post hospital follow up with his wife. Just discharged from [facility]. Feeling at his baseline. Gets SOB with minimal exertion. O2 sats in the low 80's on 3-4 L NC,  on initial assessment today. Has complaints that his feet are red and swollen today. Wants to keep fighting. Dreading another hospital admission. Diuretics increased after visit with María Pisano (Lasix increased to BID x 3 days.)    Recently hospitalized from 4/30-5/10/23 for acute respiratory failure in the setting of COPD. He was admitted initially to the ICU and treated with IV steroids, scheduled nebulized breathing treatments, and increased supplemental oxygen. He also required BiPAP. Ultimately he was stabilized and transition to the floor. Goals of care were discussed at length with the patient and his wife. He is currently a DNR/DNI. Plan per chart review per patient and his wife; plan with next exacerbation is to return to the hospital, but to transition to palliative care and comfort measures at that time. Referred to home hospice as of 5/12/23, wife expressed they would like to speak to palliative on Friday before making a final decision. 5/17/23: presents today for follow up. Feels at his baseline, O2 sats mid to high 90s on home 3L NC. Some LE swelling, improves with elevating his legs. Able to complete ADLs and move around the house, SOB not increased from his baseline. Meeting with palliative care tomorrow. Denies chest pain, palpitations, dizziness, syncope. Feels like he's urinating much more with torsemide than he did with Lasix. Weighs himself daily, small fluctuations but overall stable. 6/1/23: presents today for follow up. In respiratory distress, confused, tachypneic.  Wife reports this has been worsening since this morning; no recent trigger, fevers, chills. +LE swelling. Confirmed at today's appointment that patient is currently FULL CODE. EMS called, transported via ambulance to ED, transfer order placed. 6/26/23: presents today for follow up. Hospitalized at \[facility]"" from 6/1 to 6/12 for respiratory distress, treated for COPD exacerbation with steroids and breathing treatments, eventually weaned off BiPAP. Treated with IV Lasix as well. Reaffirmed full CODE STATUS. Doing better today, feeling improved since hospitalization. Appetite has been down, supplementing with Ensure. Minimal lower extremity swelling, shortness of breath at baseline. Urinating well with torsemide. EMS was called on 6/13 by [facility], as patient was hypoxic and short of breath on their arrival.  EMS checked oxygen per wife, reports that was above 92%. Does report that they noticed elevated blood sugar, so she has been checking his sugars at home and noting numbers in the 200s. No further EMS calls, has been tolerating medications. Hospitalized 7/19 through 7/21 and again 7/22 through 7/29 for COPD exacerbation. Hospitalized again at [facility] from 7/31 to 8/3, again for hypoxic respiratory failure in the setting of his home BiPAP not working. Home oxygen at 6L NC at rest and 8L with exertion, along with nightly BiPAP. Has appropriate supplies at home. 8/14/23: presents today for follow up. Breathing at baseline. On 3L home O2 at rest, 6L with exertion. Some swelling in feet and ankles. Urinating well with current dose of torsemide. Drinking Ensure, trying to gain caloric weight. Weight between 92-96 lbs at home. Would like to explore virtual visits when possible going forward.      Past Medical History:   Diagnosis Date   • Acute metabolic encephalopathy 22/27/4970   • Arthritis    • Bladder mass    • Cardiomyopathy [facility])    • Chest pain    • COPD (chronic obstructive pulmonary disease) ([address])    • COVID-19 virus infection 02/23/2023   • CPAP (continuous positive airway pressure) dependence    • Emphysema of lung (HCC)    • Hypoxia     nocturnal   • Left bundle branch block    • Multiple pulmonary nodules     last assessed: 10/12/16   • Pneumonia    • Sleep apnea, obstructive    • Smoker    • Weight loss 08/29/2019     Review of Systems   Constitutional: Negative for chills and fever. HENT: Negative for ear pain. Eyes: Negative for pain. Respiratory: Positive for shortness of breath (baseline). Cardiovascular: Positive for leg swelling (baseline). Negative for chest pain and palpitations. Gastrointestinal: Negative for abdominal pain and vomiting. Genitourinary: Negative for dysuria and hematuria. Musculoskeletal: Negative for arthralgias and back pain. Skin: Negative for rash. Neurological: Negative for seizures. All other systems reviewed and are negative. 14-point ROS completed and negative except as stated above and/or in the HPI. No Known Allergies    Current Outpatient Medications:   •  acetaminophen (TYLENOL) 325 mg tablet, Take 3 tablets (975 mg total) by mouth every 8 (eight) hours as needed for mild pain or moderate pain, Disp: , Rfl: 0  •  albuterol (PROVENTIL HFA,VENTOLIN HFA) 90 mcg/act inhaler, Inhale 2 puffs every 4 (four) hours as needed for wheezing or shortness of breath, Disp: 18 g, Rfl: 5  •  Alcohol Swabs 70 % PADS, May substitute brand based on insurance coverage. Check glucose TID., Disp: 100 each, Rfl: 0  •  azithromycin (ZITHROMAX) 250 mg tablet, Take 1 tablet (250 mg total) by mouth 3 (three) times a week Take 2 tablets today then 1 tablet daily x 4 days Do not start before August 7, 2023., Disp: 12 tablet, Rfl: 0  •  Blood Glucose Monitoring Suppl (OneTouch Verio Reflect) w/Device KIT, May substitute brand based on insurance coverage.  Check glucose TID., Disp: 1 kit, Rfl: 0  •  budesonide (PULMICORT) 0.5 mg/2 mL nebulizer solution, TAKE 2 ML (0.5 MG TOTAL) BY NEBULIZATION TWICE A DAY RINSE MOUTH AFTER USE, Disp: 360 mL, Rfl: 2  •  cyanocobalamin (VITAMIN B-12) 1000 MCG tablet, Take 1 tablet (1,000 mcg total) by mouth daily, Disp: 30 tablet, Rfl: 0  •  Diclofenac Sodium (VOLTAREN) 1 %, APPLY 2 GRAMS 4 TIMES A DAY, Disp: , Rfl:   •  docusate sodium (COLACE) 100 mg capsule, Take 1 capsule (100 mg total) by mouth 2 (two) times a day, Disp: 60 capsule, Rfl: 0  •  famotidine (PEPCID) 20 mg tablet, Take 1 tablet (20 mg total) by mouth 2 (two) times a day for 14 days, Disp: 28 tablet, Rfl: 0  •  formoterol (PERFOROMIST) 20 MCG/2ML nebulizer solution, TAKE 2 ML (20 MCG TOTAL) BY NEBULIZATION 2 (TWO) TIMES A DAY, Disp: , Rfl:   •  glucose blood (OneTouch Verio) test strip, May substitute brand based on insurance coverage. Check glucose TID., Disp: 100 each, Rfl: 0  •  Insulin Pen Needle (BD Pen Needle Cathie 2nd Gen) 32G X 4 MM MISC, For use with insulin pen. Pharmacy may dispense brand covered by insurance., Disp: 100 each, Rfl: 0  •  insuln lispro (HumaLOG KwikPen) 200 units/mL CONCENTRATED U-200 injection pen, Inject 3 Units under the skin 3 (three) times a day with meals, Disp: 6 mL, Rfl: 0  •  ipratropium (ATROVENT) 0.02 % nebulizer solution, Take 2.5 mL (0.5 mg total) by nebulization every 6 (six) hours, Disp: 300 mL, Rfl: 0  •  levalbuterol (XOPENEX) 1.25 mg/3 mL nebulizer solution, Take 3 mL (1.25 mg total) by nebulization every 6 (six) hours, Disp: 360 mL, Rfl: 0  •  Melatonin 5 MG TABS, Take 1 tablet by mouth daily at bedtime, Disp: , Rfl:   •  OneTouch Delica Lancets 99K MISC, May substitute brand based on insurance coverage.  Check glucose TID., Disp: 100 each, Rfl: 0  •  potassium chloride (K-DUR,KLOR-CON) 20 mEq tablet, Take 1 tablet (20 mEq total) by mouth 2 (two) times a day, Disp: 60 tablet, Rfl: 0  •  predniSONE 5 mg tablet, Take 1 tablet (5 mg total) by mouth daily Resume prednisone 5mg daily when taper is completed Do not start before August 8, 2023., Disp: 30 tablet, Rfl: 0  •  rosuvastatin (CRESTOR) 10 MG tablet, Take 1 tablet (10 mg total) by mouth daily, Disp: 90 tablet, Rfl: 3  •  senna (SENOKOT) 8.6 mg, Take 1 tablet (8.6 mg total) by mouth daily at bedtime, Disp: 30 tablet, Rfl: 0  •  sodium chloride (OCEAN) 0.65 % nasal spray, 1 spray into each nostril every hour as needed for congestion, Disp: 30 mL, Rfl: 0  •  tamsulosin (FLOMAX) 0.4 mg, Take 1 capsule (0.4 mg total) by mouth daily with dinner, Disp: , Rfl: 0  •  torsemide (DEMADEX) 10 mg tablet, Take 4 tablets (40 mg total) by mouth daily, Disp: 90 tablet, Rfl: 0  •  predniSONE 10 mg tablet, Prednisone 10mg tablets PO taper. Taper by 10mg every three days. 4 tablets for 2 days;  3 tablets for 3 days; 2 tablets for 3 days, 1 tablets for 3 days Then stop (Patient not taking: Reported on 2023), Disp: 26 tablet, Rfl: 0    Social History     Socioeconomic History   • Marital status: /Civil Union     Spouse name: Not on file   • Number of children: Not on file   • Years of education: Not on file   • Highest education level: Not on file   Occupational History   • Not on file   Tobacco Use   • Smoking status: Former     Packs/day: 2.50     Years: 42.00     Total pack years: 105.00     Types: Cigarettes     Start date: 5     Quit date:      Years since quittin.6   • Smokeless tobacco: Former   • Tobacco comments:     1 ppd for 37 years, 2010 down to 5 cigs a day, is around second hand smoke   Vaping Use   • Vaping Use: Never used   Substance and Sexual Activity   • Alcohol use: Not Currently     Comment: rarely   • Drug use: No   • Sexual activity: Not Currently     Partners: Female   Other Topics Concern   • Not on file   Social History Narrative    Daily coffee consumption: 8 or more cups a day        Used to work in [employer]. On disability.      Social Determinants of Health     Financial Resource Strain: Not on file   Food Insecurity: No Food Insecurity (2023)    Hunger Vital Sign    • Worried About Running Out of Food in the Last Year: Never true    • Ran Out of Food in the Last Year: Never true   Transportation Needs: No Transportation Needs (7/20/2023)    PRAPARE - Transportation    • Lack of Transportation (Medical): No    • Lack of Transportation (Non-Medical): No   Physical Activity: Not on file   Stress: Not on file   Social Connections: Not on file   Intimate Partner Violence: Not on file   Housing Stability: Low Risk  (7/20/2023)    Housing Stability Vital Sign    • Unable to Pay for Housing in the Last Year: No    • Number of Places Lived in the Last Year: 1    • Unstable Housing in the Last Year: No     Family History   Problem Relation Age of Onset   • Diabetes Mother        Vitals:  Blood pressure 100/58, pulse 90, SpO2 98 %. There is no height or weight on file to calculate BMI. Wt Readings from Last 10 Encounters:   07/29/23 43.8 kg (96 lb 9.6 oz)   07/21/23 44.5 kg (98 lb 3.2 oz)   06/26/23 43.8 kg (96 lb 8 oz)   06/16/23 43.1 kg (95 lb)   06/12/23 47.8 kg (105 lb 4.8 oz)   06/01/23 46.7 kg (103 lb)   05/19/23 46.8 kg (103 lb 2.8 oz)   05/18/23 47.2 kg (104 lb)   05/16/23 48 kg (105 lb 12.8 oz)   05/10/23 52.4 kg (115 lb 9.6 oz)     Vitals:    08/14/23 1114   BP: 100/58   BP Location: Left arm   Patient Position: Sitting   Cuff Size: Standard   Pulse: 90   SpO2: 98%       Physical Exam  Constitutional:       General: He is not in acute distress. Appearance: He is ill-appearing. HENT:      Head: Normocephalic. Nose: Nose normal.      Mouth/Throat:      Mouth: Mucous membranes are moist.   Eyes:      Conjunctiva/sclera: Conjunctivae normal.   Neck:      Vascular: No JVD. Cardiovascular:      Rate and Rhythm: Normal rate and regular rhythm. Comments: Pitting edema at ankles  Pulmonary:      Breath sounds: Wheezing (expiratory) present. Comments: decreased air movement  Musculoskeletal:      Cervical back: Neck supple.       Right lower leg: Edema present. Left lower leg: Edema present. Skin:     General: Skin is dry. Neurological:      General: No focal deficit present. Mental Status: He is alert. Psychiatric:         Mood and Affect: Mood normal.         Labs & Results:  Lab Results   Component Value Date    WBC 8.69 08/03/2023    HGB 9.1 (L) 08/03/2023    HCT 29.5 (L) 08/03/2023     (H) 08/03/2023     08/03/2023     Lab Results   Component Value Date    SODIUM 133 (L) 08/03/2023    K 4.3 08/03/2023    CL 87 (L) 08/03/2023    CO2 42 (H) 08/03/2023    BUN 38 (H) 08/03/2023    CREATININE 0.97 08/03/2023    GLUC 338 (H) 08/03/2023    CALCIUM 9.1 08/03/2023     Lab Results   Component Value Date    INR 0.78 (L) 07/19/2023    INR 0.84 06/01/2023    INR 0.93 02/12/2023    PROTIME 11.1 (L) 07/19/2023    PROTIME 11.7 06/01/2023    PROTIME 12.6 02/12/2023     Lab Results   Component Value Date     (H) 07/22/2023      Lab Results   Component Value Date    NTBNP 17,569 (H) 04/30/2023        Thank you for the opportunity to participate in the care of this patient.     Noah Wilkerson PA-C

## 2023-08-11 PROBLEM — J18.9 PNEUMONIA: Status: RESOLVED | Noted: 2023-06-05 | Resolved: 2023-08-11

## 2023-08-14 ENCOUNTER — OFFICE VISIT (OUTPATIENT)
Dept: CARDIOLOGY CLINIC | Facility: CLINIC | Age: 68
End: 2023-08-14
Payer: COMMERCIAL

## 2023-08-14 VITALS — SYSTOLIC BLOOD PRESSURE: 100 MMHG | HEART RATE: 90 BPM | OXYGEN SATURATION: 98 % | DIASTOLIC BLOOD PRESSURE: 58 MMHG

## 2023-08-14 DIAGNOSIS — I50.22 CHRONIC HFREF (HEART FAILURE WITH REDUCED EJECTION FRACTION) (HCC): ICD-10-CM

## 2023-08-14 DIAGNOSIS — J44.9 CHRONIC OBSTRUCTIVE PULMONARY DISEASE, UNSPECIFIED COPD TYPE (HCC): ICD-10-CM

## 2023-08-14 DIAGNOSIS — Z09 HOSPITAL DISCHARGE FOLLOW-UP: ICD-10-CM

## 2023-08-14 DIAGNOSIS — I50.23 ACUTE ON CHRONIC HFREF (HEART FAILURE WITH REDUCED EJECTION FRACTION) (HCC): Primary | ICD-10-CM

## 2023-08-14 PROCEDURE — 99214 OFFICE O/P EST MOD 30 MIN: CPT | Performed by: PHYSICIAN ASSISTANT

## 2023-08-14 NOTE — PATIENT INSTRUCTIONS
Continue current medications. Please weigh yourself every day (after emptying your bladder) and keep a detailed log of weights. Contact the Heart Failure program at [phone] if you gain 3+ lbs overnight or 5+ lbs in 5-7 days. Limit daily sodium/salt intake to 2000 mg daily to prevent fluid retention. Avoid canned foods, fast food/Chinese food, and processed meats (hot dogs, lunch meat, and sausage etc.). Caution with condiments. Limit fluid intake to 2000 mL or 2 liters (about 60-65 ounces) daily. Avoid electrolyte replacement drinks (such as Gatorade, Pedialyte, Propel, Liquid IV, etc.). Bring complete list of medications and log of daily weights to your follow-up appointment.

## 2023-08-16 ENCOUNTER — OFFICE VISIT (OUTPATIENT)
Dept: INTERNAL MEDICINE CLINIC | Facility: CLINIC | Age: 68
End: 2023-08-16
Payer: COMMERCIAL

## 2023-08-16 VITALS
HEART RATE: 97 BPM | OXYGEN SATURATION: 98 % | SYSTOLIC BLOOD PRESSURE: 98 MMHG | DIASTOLIC BLOOD PRESSURE: 58 MMHG | TEMPERATURE: 98.6 F

## 2023-08-16 DIAGNOSIS — R73.03 PREDIABETES: ICD-10-CM

## 2023-08-16 DIAGNOSIS — J96.22 ACUTE ON CHRONIC RESPIRATORY FAILURE WITH HYPOXIA AND HYPERCAPNIA (HCC): ICD-10-CM

## 2023-08-16 DIAGNOSIS — R91.1 LUNG NODULE: ICD-10-CM

## 2023-08-16 DIAGNOSIS — I50.22 CHRONIC HFREF (HEART FAILURE WITH REDUCED EJECTION FRACTION) (HCC): Primary | Chronic | ICD-10-CM

## 2023-08-16 DIAGNOSIS — J96.21 ACUTE ON CHRONIC RESPIRATORY FAILURE WITH HYPOXIA AND HYPERCAPNIA (HCC): ICD-10-CM

## 2023-08-16 PROCEDURE — 99495 TRANSJ CARE MGMT MOD F2F 14D: CPT | Performed by: INTERNAL MEDICINE

## 2023-08-16 NOTE — PROGRESS NOTES
Assessment/Plan:    Problem List Items Addressed This Visit        Respiratory    Acute on chronic respiratory failure with hypoxia and hypercapnia      -multiple exacerbations requiring hospitalizations, prednisone bursts  -now back to baseline prednisone at 5mg, uses continuous 3-4L of oxygen at rest, 6L with exertion and Bipap qhs and nightly  -on azithromycin MWF  -continue inhalers  -followed closely by Pulm         Lung nodule     -new RUL 6mm nodule noted on CTA 7/31/23  -surveillance CT recommended 1/2024            Cardiovascular and Mediastinum    Chronic HFrEF (heart failure with reduced ejection fraction)  - Primary (Chronic)     -noncompliant with low sodium diet  -has significant BLE pitting edema  -torsemide increased to 40mg daily since his first hospitalization in mid July                Other    Prediabetes     -A1c was 5.7 on 7/20/23  -secondary to  Prednisone use.   -was discharged on humalog but has not been compliant with instructions to check his blood sugars or insulin use            Subjective:      Patient ID: John Baeza. is a 79 y.o. male. HPI  TCM Call     Date and time call was made  6/29/2023  8:07 AM    Hospital care reviewed  Records reviewed    Patient was hospitialized at  [address] [place] [address]  rehab    Date of Admission  07/22/23    Date of discharge  07/29/23    Diagnosis  End-stage COPD with acute exacerbation ([facility])    Disposition  Home    Were the patients medications reviewed and updated  Yes    Current Symptoms  Fatigue    Shortness of breath severity  Mild    Fatigue severity  Mild      TCM Call     Post hospital issues  None    Should patient be enrolled in anticoag monitoring? No    Scheduled for follow up?   No    Did you obtain your prescribed medications  Yes    Do you need help managing your prescriptions or medications  No    Is transportation to your appointment needed  No    I have advised the patient to call PCP with any new or worsening symptoms  Lamont niall johnston MA    Living Arrangements  Spouse or Significiant other    Support System  None    The type of support provided  None    Do you have social support  Yes, as much as I need    Are you recieving any outpatient services  No    Are you recieving home care services  No  patient refused    Are you using any community resources  No    Current waiver services  No    Have you fallen in the last 12 months  No    Interperter language line needed  No    Counseling  Family    Counseling topics  Diagnostic results    Comments  appointment scheduled for 4/7        He is accompanied by his wife and son. He was hospitalized at [facility] 7/19-7/21/23 for SOB, treated for acute CHF and COPD exacerbation. Received IV lasix, solumedrol, nebs. Required bipap. Home torsemide was adjusted to 30mg daily and discharged on prednisone taper. He was rehospitalized at [facility] 7/22-7/29/23 with worsened SOB. In addition to continued treatment from his previous hospitalization, he was discharged on azithromycin for three weeks and torsemide was increased again. He was hospitalized again shortly after 7/29-8/3/23 for respiratory failure. Azithromycin was changed to MWF dosing. He was noted to have new RUL 6mm nodule. He remained full code. He was discharged on Humalog 3units TID with meals but report she has not been checking his blood sugars and has not given himself insulin. Has has poor appetite but eats. Takes ensure drinks three times per day. He is now at baseline prednisone 5mg daily, continuous azithromycin MWF.   Reports breathing is "ok"    The following portions of the patient's history were reviewed and updated as appropriate: allergies, current medications, past family history, past medical history, past social history, past surgical history and problem list.      Current Outpatient Medications:   •  acetaminophen (TYLENOL) 325 mg tablet, Take 3 tablets (975 mg total) by mouth every 8 (eight) hours as needed for mild pain or moderate pain, Disp: , Rfl: 0  •  albuterol (PROVENTIL HFA,VENTOLIN HFA) 90 mcg/act inhaler, Inhale 2 puffs every 4 (four) hours as needed for wheezing or shortness of breath, Disp: 18 g, Rfl: 5  •  Alcohol Swabs 70 % PADS, May substitute brand based on insurance coverage. Check glucose TID., Disp: 100 each, Rfl: 0  •  azithromycin (ZITHROMAX) 250 mg tablet, Take 1 tablet (250 mg total) by mouth 3 (three) times a week Take 2 tablets today then 1 tablet daily x 4 days Do not start before August 7, 2023., Disp: 12 tablet, Rfl: 0  •  Blood Glucose Monitoring Suppl (OneTouch Verio Reflect) w/Device KIT, May substitute brand based on insurance coverage. Check glucose TID., Disp: 1 kit, Rfl: 0  •  budesonide (PULMICORT) 0.5 mg/2 mL nebulizer solution, TAKE 2 ML (0.5 MG TOTAL) BY NEBULIZATION TWICE A DAY RINSE MOUTH AFTER USE, Disp: 360 mL, Rfl: 2  •  cyanocobalamin (VITAMIN B-12) 1000 MCG tablet, Take 1 tablet (1,000 mcg total) by mouth daily, Disp: 30 tablet, Rfl: 0  •  Diclofenac Sodium (VOLTAREN) 1 %, APPLY 2 GRAMS 4 TIMES A DAY, Disp: , Rfl:   •  docusate sodium (COLACE) 100 mg capsule, Take 1 capsule (100 mg total) by mouth 2 (two) times a day, Disp: 60 capsule, Rfl: 0  •  formoterol (PERFOROMIST) 20 MCG/2ML nebulizer solution, TAKE 2 ML (20 MCG TOTAL) BY NEBULIZATION 2 (TWO) TIMES A DAY, Disp: , Rfl:   •  glucose blood (OneTouch Verio) test strip, May substitute brand based on insurance coverage. Check glucose TID., Disp: 100 each, Rfl: 0  •  Insulin Pen Needle (BD Pen Needle Cathie 2nd Gen) 32G X 4 MM MISC, For use with insulin pen.  Pharmacy may dispense brand covered by insurance., Disp: 100 each, Rfl: 0  •  insuln lispro (HumaLOG KwikPen) 200 units/mL CONCENTRATED U-200 injection pen, Inject 3 Units under the skin 3 (three) times a day with meals, Disp: 6 mL, Rfl: 0  •  ipratropium (ATROVENT) 0.02 % nebulizer solution, Take 2.5 mL (0.5 mg total) by nebulization every 6 (six) hours, Disp: 300 mL, Rfl: 0  •  levalbuterol (XOPENEX) 1.25 mg/3 mL nebulizer solution, Take 3 mL (1.25 mg total) by nebulization every 6 (six) hours, Disp: 360 mL, Rfl: 0  •  Melatonin 5 MG TABS, Take 1 tablet by mouth daily at bedtime, Disp: , Rfl:   •  predniSONE 5 mg tablet, Take 1 tablet (5 mg total) by mouth daily Resume prednisone 5mg daily when taper is completed Do not start before August 8, 2023., Disp: 30 tablet, Rfl: 0  •  senna (SENOKOT) 8.6 mg, Take 1 tablet (8.6 mg total) by mouth daily at bedtime, Disp: 30 tablet, Rfl: 0  •  sodium chloride (OCEAN) 0.65 % nasal spray, 1 spray into each nostril every hour as needed for congestion, Disp: 30 mL, Rfl: 0  •  torsemide (DEMADEX) 10 mg tablet, Take 4 tablets (40 mg total) by mouth daily, Disp: 90 tablet, Rfl: 0  •  famotidine (PEPCID) 20 mg tablet, Take 1 tablet (20 mg total) by mouth 2 (two) times a day for 14 days, Disp: 28 tablet, Rfl: 0  •  OneTouch Delica Lancets 13J MISC, May substitute brand based on insurance coverage. Check glucose TID., Disp: 100 each, Rfl: 0  •  potassium chloride (K-DUR,KLOR-CON) 20 mEq tablet, Take 1 tablet (20 mEq total) by mouth 2 (two) times a day, Disp: 60 tablet, Rfl: 0  •  predniSONE 10 mg tablet, Prednisone 10mg tablets PO taper. Taper by 10mg every three days. 4 tablets for 2 days;  3 tablets for 3 days; 2 tablets for 3 days, 1 tablets for 3 days Then stop (Patient not taking: Reported on 8/14/2023), Disp: 26 tablet, Rfl: 0  •  rosuvastatin (CRESTOR) 10 MG tablet, Take 1 tablet (10 mg total) by mouth daily, Disp: 90 tablet, Rfl: 3  •  tamsulosin (FLOMAX) 0.4 mg, Take 1 capsule (0.4 mg total) by mouth daily with dinner, Disp: , Rfl: 0    Review of Systems   Constitutional: Positive for appetite change and chills. Negative for activity change and fever. HENT: Positive for congestion. Respiratory: Positive for cough, shortness of breath and wheezing.     Cardiovascular: Positive for palpitations (on occasion) and leg swelling. Negative for chest pain. Gastrointestinal: Negative for abdominal pain, constipation, diarrhea, nausea and vomiting. Genitourinary: Positive for frequency. Negative for hematuria. Musculoskeletal: Positive for arthralgias. Neurological: Negative for dizziness and headaches. Objective:    BP 98/58 (BP Location: Left arm, Patient Position: Sitting, Cuff Size: Standard)   Pulse 97   Temp 98.6 °F (37 °C) (Tympanic Core)   SpO2 98%      Physical Exam  Vitals reviewed. Constitutional:       General: He is not in acute distress. Appearance: He is cachectic. Comments: Sits in wheelchair   HENT:      Nose:      Comments: Wears nasal cannula  Cardiovascular:      Rate and Rhythm: Tachycardia present. Comments: Distant heart sounds  Pulmonary:      Comments: Decreased breath sounds  Mild conversational dyspnea  Musculoskeletal:      Right lower leg: Edema (3+ pitting edema) present. Left lower leg: Edema (3+ pitting edema) present. Neurological:      Mental Status: He is alert and oriented to person, place, and time.

## 2023-08-19 NOTE — ASSESSMENT & PLAN NOTE
-noncompliant with low sodium diet  -has significant BLE pitting edema  -torsemide increased to 40mg daily since his first hospitalization in mid July

## 2023-08-19 NOTE — ASSESSMENT & PLAN NOTE
-A1c was 5.7 on 7/20/23  -secondary to  Prednisone use.   -was discharged on humalog but has not been compliant with instructions to check his blood sugars or insulin use

## 2023-08-19 NOTE — ASSESSMENT & PLAN NOTE
-multiple exacerbations requiring hospitalizations, prednisone bursts  -now back to baseline prednisone at 5mg, uses continuous 3-4L of oxygen at rest, 6L with exertion and Bipap qhs and nightly  -on azithromycin MWF  -continue inhalers  -followed closely by [facility]

## 2023-08-25 RX ORDER — INSULIN ASPART 100 [IU]/ML
INJECTION, SOLUTION INTRAVENOUS; SUBCUTANEOUS
COMMUNITY
Start: 2023-08-03

## 2023-08-30 ENCOUNTER — OFFICE VISIT (OUTPATIENT)
Dept: PULMONOLOGY | Facility: CLINIC | Age: 68
End: 2023-08-30
Payer: COMMERCIAL

## 2023-08-30 ENCOUNTER — APPOINTMENT (OUTPATIENT)
Dept: LAB | Facility: CLINIC | Age: 68
End: 2023-08-30
Payer: COMMERCIAL

## 2023-08-30 ENCOUNTER — HOSPITAL ENCOUNTER (OUTPATIENT)
Dept: RADIOLOGY | Facility: HOSPITAL | Age: 68
Discharge: HOME | End: 2023-08-30
Payer: COMMERCIAL

## 2023-08-30 ENCOUNTER — TELEPHONE (OUTPATIENT)
Dept: NEPHROLOGY | Facility: CLINIC | Age: 68
End: 2023-08-30

## 2023-08-30 VITALS
DIASTOLIC BLOOD PRESSURE: 60 MMHG | TEMPERATURE: 97.1 F | OXYGEN SATURATION: 91 % | RESPIRATION RATE: 16 BRPM | HEART RATE: 123 BPM | SYSTOLIC BLOOD PRESSURE: 102 MMHG

## 2023-08-30 DIAGNOSIS — J44.1 COPD WITH ACUTE EXACERBATION (HCC): ICD-10-CM

## 2023-08-30 DIAGNOSIS — E87.1 HYPONATREMIA: ICD-10-CM

## 2023-08-30 DIAGNOSIS — J96.12 CHRONIC RESPIRATORY FAILURE WITH HYPOXIA AND HYPERCAPNIA (HCC): ICD-10-CM

## 2023-08-30 DIAGNOSIS — G47.33 OBSTRUCTIVE SLEEP APNEA: Primary | ICD-10-CM

## 2023-08-30 DIAGNOSIS — J96.11 CHRONIC RESPIRATORY FAILURE WITH HYPOXIA AND HYPERCAPNIA (HCC): ICD-10-CM

## 2023-08-30 DIAGNOSIS — I50.22 CHRONIC HFREF (HEART FAILURE WITH REDUCED EJECTION FRACTION) (HCC): ICD-10-CM

## 2023-08-30 LAB
BUN SERPL-MCNC: 16 MG/DL (ref 5–25)
CALCIUM SERPL-MCNC: 9.6 MG/DL (ref 8.4–10.2)
CHLORIDE SERPL-SCNC: 87 MMOL/L (ref 96–108)
CO2 SERPL-SCNC: >45 MMOL/L (ref 21–32)
CREAT SERPL-MCNC: 0.75 MG/DL (ref 0.6–1.3)
EST. AVERAGE GLUCOSE BLD GHB EST-MCNC: 140 MG/DL
GFR SERPL CREATININE-BSD FRML MDRD: 95 ML/MIN/1.73SQ M
GLUCOSE SERPL-MCNC: 143 MG/DL (ref 65–140)
HBA1C MFR BLD: 6.5 %
POTASSIUM SERPL-SCNC: 4.8 MMOL/L (ref 3.5–5.3)
SODIUM SERPL-SCNC: 136 MMOL/L (ref 135–147)

## 2023-08-30 PROCEDURE — 99215 OFFICE O/P EST HI 40 MIN: CPT | Performed by: NURSE PRACTITIONER

## 2023-08-30 PROCEDURE — 80048 BASIC METABOLIC PNL TOTAL CA: CPT

## 2023-08-30 PROCEDURE — 71046 X-RAY EXAM CHEST 2 VIEWS: CPT

## 2023-08-30 RX ORDER — DOXYCYCLINE 100 MG/1
100 TABLET ORAL 2 TIMES DAILY
Qty: 14 TABLET | Refills: 0 | Status: SHIPPED | OUTPATIENT
Start: 2023-08-30 | End: 2023-09-06

## 2023-08-30 NOTE — TELEPHONE ENCOUNTER
Left message with patient advising:Please call patient let him know I reviewed his most recent lab work, his sodium level is now normal at 136.   Continue current regimen and will follow-up with Dr. José Miguel Hoffmann in the office in September

## 2023-08-30 NOTE — TELEPHONE ENCOUNTER
----- Message from Valdemar Christianson, [address] [facility] sent at 8/30/2023 12:48 PM EDT -----  Please call patient let him know I reviewed his most recent lab work, his sodium level is now normal at 136.   Continue current regimen and will follow-up with Dr. Abhay Delaney in the office in September

## 2023-08-30 NOTE — PROGRESS NOTES
Pulmonary Follow-Up Note   Masha Score. 79 y.o. male MRN: [id number]  8/30/2023      Assessment/Plan:    Problem List Items Addressed This Visit        Respiratory    Obstructive sleep apnea - Primary    Chronic respiratory failure with hypoxia and hypercapnia (HCC)    End-stage COPD with acute exacerbation (HCC)    Relevant Medications    doxycycline (ADOXA) 100 MG tablet    Other Relevant Orders    XR chest pa & lateral       1. Very severe end-stage COPD        -Patient appeared very symptomatic with shortness of breath and increased sputum production and belly breathing        -Unfortunately given severity of disease I do feel we are close to patient's respiratory baseline        -Patient does not feel any significant benefit from increased prednisone        -We will continue home regimen: Budesonide/Perforomist twice daily, DuoNeb 3 times daily, prednisone 5 mg        -We will place order for Daliresp given severity of sputum production        -Patient trialed chronic azithromycin with no benefit        -Palliative care following    2. Chronic hypoxic and hypercapnic respiratory failure likely multifaceted as listed below        -Patient currently on 4 L at rest and 6 L upon ambulating        -We will continue to maintain saturations greater than 88%        -he monitors home pulse ox    3. Abnormal chest x-ray        -Some vascular congestion/ GGO        -We will give increased torsemide x 2 days        -We will give doxycycline x7 days        -Consider repeat chest x-ray in 4 to 6 weeks    4. KEVIN        -Compliance: 3/31-4/29        -Average usage 4 hours and 57 minutes        -IPAP 12 cm of H2O/EPAP 5 cm of H2O        -Median leak time 0.5mi        -AHI - 1.9        No follow-ups on file. History of Present Illness   Reason for Visit: Hospital follow-up  Chief Complaint: "I feel short of breath"  HPI: Masha Score. is a 79 y.o. male who presents to the office today for shortness of breath. Patient reports that since being discharged he has had some increasing sputum production that is difficult to expectorate. Patient reports that he has minimal activity at home and is significantly short of breath with minimal exertion. Today upon examination patient had extremely diminished aeration throughout lung fields. Patient did appear to have increased lower extremity edema. Given increased urine production along with increased lower extremity edema patient will initiate course of doxycycline and increase his torsemide 40 mg x 2 days. Review of Systems   Constitutional: Negative for chills and fever. HENT: Negative for ear pain and sore throat. Eyes: Negative for pain and visual disturbance. Respiratory: Positive for cough and shortness of breath. Negative for apnea, choking, chest tightness, wheezing and stridor. Cardiovascular: Negative for chest pain and palpitations. Gastrointestinal: Negative for abdominal pain and vomiting. Genitourinary: Negative for dysuria and hematuria. Musculoskeletal: Negative for arthralgias and back pain. Skin: Negative for color change and rash. Neurological: Negative for seizures and syncope. Psychiatric/Behavioral: Negative for agitation and behavioral problems. All other systems reviewed and are negative.       Historical Information   Past Medical History:   Diagnosis Date   • Acute metabolic encephalopathy 86/73/5750   • Arthritis    • Bladder mass    • Cardiomyopathy [facility])    • Chest pain    • COPD (chronic obstructive pulmonary disease) ([facility])    • COVID-19 virus infection 02/23/2023   • CPAP (continuous positive airway pressure) dependence    • Emphysema of lung (HCC)    • Hypoxia     nocturnal   • Left bundle branch block    • Multiple pulmonary nodules     last assessed: 10/12/16   • Pneumonia    • Sleep apnea, obstructive    • Smoker    • Weight loss 08/29/2019     Past Surgical History:   Procedure Laterality Date   • COLONOSCOPY     • CYSTOSCOPY     • CYSTOSCOPY  2021   • HI BLADDER INSTILLATION ANTICARCINOGENIC AGENT N/A 1/3/2020    Procedure: INSTILLATION MITOMYCIN;  Surgeon: Natalie Espitia MD;  Location: [facility];  Service: Urology   • HI CYSTO W/REMOVAL OF LESIONS SMALL N/A 1/3/2020    Procedure: TRANSURETHRAL RESECTION OF BLADDER TUMOR (TURBT);   Surgeon: Natalie Espitia MD;  [facility];  Service: Urology   • HI CYSTOURETHROSCOPY WITH BIOPSY N/A 2021    Procedure: Maryanne Merlin;  Surgeon: Natalie Espitia MD;  [facility];  Service: Urology   • HI TRURL ELECTROSURG [address] PROSTATE BLEED COMPLETE N/A 2021    Procedure: TRANSURETHRAL RESECTION OF PROSTATE (TURP) BLADDER BIOPSY, FULGURATION;  Surgeon: Natalie Espitia MD;  Location: [facility];  Service: Urology     Family History   Problem Relation Age of Onset   • Diabetes Mother      Social History   Social History     Substance and Sexual Activity   Alcohol Use Not Currently    Comment: rarely     Social History     Substance and Sexual Activity   Drug Use No     Social History     Tobacco Use   Smoking Status Former   • Packs/day: 2.50   • Years: 42.00   • Total pack years: 105.00   • Types: Cigarettes   • Start date: 5   • Quit date:    • Years since quittin.6   Smokeless Tobacco Former   Tobacco Comments    1 ppd for 37 years, 2010 down to 5 cigs a day, is around second hand smoke     E-Cigarette/Vaping   • E-Cigarette Use Never User      E-Cigarette/Vaping Substances   • Nicotine No    • THC No    • CBD No    • Flavoring No    • Other No    • Unknown No        Meds/Allergies     Current Outpatient Medications:   •  acetaminophen (TYLENOL) 325 mg tablet, Take 3 tablets (975 mg total) by mouth every 8 (eight) hours as needed for mild pain or moderate pain, Disp: , Rfl: 0  •  albuterol (PROVENTIL HFA,VENTOLIN HFA) 90 mcg/act inhaler, Inhale 2 puffs every 4 (four) hours as needed for wheezing or shortness of breath, Disp: 18 g, Rfl: 5  •  Alcohol Swabs 70 % PADS, May substitute brand based on insurance coverage. Check glucose TID., Disp: 100 each, Rfl: 0  •  azithromycin (ZITHROMAX) 250 mg tablet, Take 1 tablet (250 mg total) by mouth 3 (three) times a week Take 2 tablets today then 1 tablet daily x 4 days Do not start before August 7, 2023., Disp: 12 tablet, Rfl: 0  •  Blood Glucose Monitoring Suppl (OneTouch Verio Reflect) w/Device KIT, May substitute brand based on insurance coverage. Check glucose TID., Disp: 1 kit, Rfl: 0  •  budesonide (PULMICORT) 0.5 mg/2 mL nebulizer solution, TAKE 2 ML (0.5 MG TOTAL) BY NEBULIZATION TWICE A DAY RINSE MOUTH AFTER USE, Disp: 360 mL, Rfl: 2  •  cyanocobalamin (VITAMIN B-12) 1000 MCG tablet, Take 1 tablet (1,000 mcg total) by mouth daily, Disp: 30 tablet, Rfl: 0  •  Diclofenac Sodium (VOLTAREN) 1 %, APPLY 2 GRAMS 4 TIMES A DAY, Disp: , Rfl:   •  docusate sodium (COLACE) 100 mg capsule, Take 1 capsule (100 mg total) by mouth 2 (two) times a day, Disp: 60 capsule, Rfl: 0  •  doxycycline (ADOXA) 100 MG tablet, Take 1 tablet (100 mg total) by mouth 2 (two) times a day for 7 days, Disp: 14 tablet, Rfl: 0  •  formoterol (PERFOROMIST) 20 MCG/2ML nebulizer solution, TAKE 2 ML (20 MCG TOTAL) BY NEBULIZATION 2 (TWO) TIMES A DAY, Disp: , Rfl:   •  glucose blood (OneTouch Verio) test strip, May substitute brand based on insurance coverage. Check glucose TID., Disp: 100 each, Rfl: 0  •  Insulin Pen Needle (BD Pen Needle Cathie 2nd Gen) 32G X 4 MM MISC, For use with insulin pen.  Pharmacy may dispense brand covered by insurance., Disp: 100 each, Rfl: 0  •  insuln lispro (HumaLOG KwikPen) 200 units/mL CONCENTRATED U-200 injection pen, Inject 3 Units under the skin 3 (three) times a day with meals, Disp: 6 mL, Rfl: 0  •  ipratropium (ATROVENT) 0.02 % nebulizer solution, Take 2.5 mL (0.5 mg total) by nebulization every 6 (six) hours, Disp: 300 mL, Rfl: 0  •  levalbuterol (XOPENEX) 1.25 mg/3 mL nebulizer solution, Take 3 mL (1.25 mg total) by nebulization every 6 (six) hours, Disp: 360 mL, Rfl: 0  •  Melatonin 5 MG TABS, Take 1 tablet by mouth daily at bedtime, Disp: , Rfl:   •  NovoLOG FlexPen 100 units/mL injection pen, INJECT 3 UNITS UDNER THE SKIN 3 TIMES A DAY WITH MEALS, Disp: , Rfl:   •  OneTouch Delica Lancets 86L MISC, May substitute brand based on insurance coverage. Check glucose TID., Disp: 100 each, Rfl: 0  •  potassium chloride (K-DUR,KLOR-CON) 20 mEq tablet, Take 1 tablet (20 mEq total) by mouth 2 (two) times a day, Disp: 60 tablet, Rfl: 0  •  predniSONE 5 mg tablet, Take 1 tablet (5 mg total) by mouth daily Resume prednisone 5mg daily when taper is completed Do not start before August 8, 2023., Disp: 30 tablet, Rfl: 0  •  rosuvastatin (CRESTOR) 10 MG tablet, Take 1 tablet (10 mg total) by mouth daily, Disp: 90 tablet, Rfl: 3  •  senna (SENOKOT) 8.6 mg, Take 1 tablet (8.6 mg total) by mouth daily at bedtime, Disp: 30 tablet, Rfl: 0  •  sodium chloride (OCEAN) 0.65 % nasal spray, 1 spray into each nostril every hour as needed for congestion, Disp: 30 mL, Rfl: 0  •  tamsulosin (FLOMAX) 0.4 mg, Take 1 capsule (0.4 mg total) by mouth daily with dinner, Disp: , Rfl: 0  •  torsemide (DEMADEX) 10 mg tablet, Take 4 tablets (40 mg total) by mouth daily, Disp: 90 tablet, Rfl: 0  •  famotidine (PEPCID) 20 mg tablet, Take 1 tablet (20 mg total) by mouth 2 (two) times a day for 14 days, Disp: 28 tablet, Rfl: 0  •  predniSONE 10 mg tablet, Prednisone 10mg tablets PO taper. Taper by 10mg every three days. 4 tablets for 2 days;  3 tablets for 3 days; 2 tablets for 3 days, 1 tablets for 3 days Then stop (Patient not taking: Reported on 8/30/2023), Disp: 26 tablet, Rfl: 0  No Known Allergies    Vitals: Blood pressure 102/60, pulse (!) 123, temperature (!) 97.1 °F (36.2 °C), temperature source Tympanic, resp. rate 16, SpO2 91 %. There is no height or weight on file to calculate BMI.  Oxygen Therapy  SpO2: 91 %  Oxygen Therapy: Supplemental oxygen  O2 Delivery Method: Nasal cannula  O2 Flow Rate (L/min): 4 L/min    Physical Exam:  Physical Exam  Constitutional:       General: He is not in acute distress. Appearance: Normal appearance. He is normal weight. He is not ill-appearing. HENT:      Head: Normocephalic and atraumatic. Nose: No congestion or rhinorrhea. Mouth/Throat:      Mouth: Mucous membranes are dry. Pharynx: Oropharynx is clear. No oropharyngeal exudate or posterior oropharyngeal erythema. Cardiovascular:      Rate and Rhythm: Normal rate and regular rhythm. Pulses: Normal pulses. Heart sounds: Normal heart sounds. No murmur heard. No friction rub. No gallop. Pulmonary:      Effort: No tachypnea, bradypnea, accessory muscle usage or respiratory distress. Breath sounds: Decreased air movement present. No stridor. Decreased breath sounds present. No wheezing, rhonchi or rales. Comments: Significantly diminished aeration  Chest:      Chest wall: No tenderness. Abdominal:      General: Abdomen is flat. Bowel sounds are normal. There is no distension. Palpations: Abdomen is soft. There is no mass. Musculoskeletal:         General: No swelling or tenderness. Normal range of motion. Cervical back: Normal range of motion. No rigidity or tenderness. Skin:     General: Skin is warm and dry. Coloration: Skin is not jaundiced or pale. Neurological:      General: No focal deficit present. Mental Status: He is alert and oriented to person, place, and time. Mental status is at baseline.    Psychiatric:         Mood and Affect: Mood normal.         Behavior: Behavior normal.             Imaging and other studies: I have personally reviewed pertinent films in PACS     Chest x-ray-mild pulmonary vascular congestion      Pulmonary Results (PFTs, PSG): I have personally reviewed pertinent films in PACS     Results:  FEV1/FVC Ratio: 30 %  Forced Vital Capacity: 2.00 L    59 % predicted  FEV1: 0.60 L     22 % predicted        After administration of bronchodilator   FVC: 2.43 L, 72 % predicted, 21 % change  FEV1: 0.66 L, 25 % predicted, 11 % change     Lung volumes by body plethysmography:   Total Lung Capacity 124 % predicted   Residual volume 243 % predicted     DLCO corrected for patients hemoglobin level: 45 %     Interpretation:     • Very severe obstructive airflow defect on spirometry     • Significant improvement in airflow or forced vital capacity in response to the administration of bronchodilator per ATS standards.     • Air trapping and hyperinflation as indicated by the lung volumes     • Moderate decrease in diffusion capacity     • Flow-volume loop consistent with obstruction         DELIA Watts  [facility]        Portions of the record may have been created with voice recognition software. Occasional wrong word or "sound a like" substitutions may have occurred due to the inherent limitations of voice recognition software. Read the chart carefully and recognize, using context, where substitutions have occurred or contact the dictating provider.

## 2023-08-31 NOTE — PROGRESS NOTES
Outpatient Virtual Regular Visit - Follow-up with Palliative and [address] [place] [address]. 79 y.o. male [id number]    Intake:    Reason for virtual visit is chronic HFrEF, end-stage COPD. The patient is unable to visit the clinic safely due to acuity of condition. After connecting through [employer], the patient was identified by name and date of birth. Erick LeeBradley or their agent was informed that this was a telemedicine visit and that the visit is being conducted through the [employer]. He agrees to proceed. .  My office door was closed. No one else was in the room. They acknowledged consent and understanding of privacy and security of the video platform. The patient or agent has agreed to participate and understands they can discontinue the visit at any time. Assessment & Plan  Problem List Items Addressed This Visit        Respiratory    Acute on chronic respiratory failure with hypoxia and hypercapnia     End-stage COPD with acute exacerbation (HCC)       Cardiovascular and Mediastinum    Chronic HFrEF (heart failure with reduced ejection fraction)  - Primary (Chronic)       Other    Protein-calorie malnutrition (HCC)    Severe protein-calorie malnutrition     Palliative care patient     Assessment/Plan:  1) Symptom Management  • Patient continues to decline any additional changes to medication regimen.   • Have discussed use of benzodiazepines for anxiety associated with air hunger in the past and patient is not interested.   ? Acetaminophen 975 mg PO q8 hrs PRN mild-moderate pain  - Do not exceed 3000 mg daily  ? Voltaren gel 2 grams four times daily  ? Sennokot-S 8.6-50 mg qHS  ? Colace 100 mg PO BID  ? Atarax 25 mg PO q6 hrs PRN anxiety - patient not using  • Patient has been counseled on tobacco cessation and educated about dangers of smoking with home oxygen. • Provided ER precautions  • He reports O2 saturation 90% on 4L at today's visit.  Has some shortness of breath which is his baseline, but pain, nausea, vomiting, dizziness, syncope.    2) Goals of Care  • Patient had been referred to hospice in the past, but did not pursue as he wishes to continue medical optimization and return for future hospitalizations. • Confirms that his goals remain the same as last visit  • Goals of care discussions will be ongoing     3) Social Support  • Patient well-supported by his wife Dutch Ang son Kevin Andino  • Being at home as much as possible is of importance  • He enjoys building models in his free time and spending time doing crossword puzzles  • Emotional support provided     4) Follow-Up  • Patient will follow-up in 2 months or sooner if needed. Please call with any questions or concerns.   • Continued to encourage patient to continue to follow with specialists and other providers as his goals are medical optimization as much as possible  • His next appointment is with cardiology on 9/6/23    Medications adjusted this encounter:  Requested Prescriptions      No prescriptions requested or ordered in this encounter     No orders of the defined types were placed in this encounter. There are no discontinued medications. Warner Travis. was seen today for symptoms and planning cares related to above illnesses. I have reviewed the patient's controlled substance dispensing history in the Prescription Drug Monitoring Program in compliance with the [facility] regulations before prescribing any controlled substances. They are invited to continue to follow with us. If there are questions or concerns, please contact us through our clinic/answering service 24 hours a day, seven days a week. Ame Sheldon PA-C  [facility]  [phone]      Visit Information    Accompanied By: No one    Source of History: Self, Medical record    History Limitations: Family/guardian not available    Chief Complaint  No chief complaint on file.       History of Present Illness      Angella Lamb Jr. is a 79 y. o. male who presents in follow up of symptoms related to acute on chronic hypercapnic and hypoxic respiratory failure, end-stage COPD, heart failure with reduced EF. He has had numerous exacerbations leading to hospitalization over the past few months. Pertinent issues include: symptom management, assessment of goals of care, advance care planning    Since last visit, patient has had several further hospitalizations due to COPD exacerbation. He was admitted to [facility] from 7/19-7/21, 7/22-7/29, and 7/31-8/3. Patient's goals have remained clear throughout this time that he wishes for full cares and to return for future hospitalizations if indicated. However, he and his family at times have decided that DNR/DNI was the appropriate care for him at this time in his life. He declined hospice several times in the past. He has also had outpatient follow up appointments with cardiology, pulmonology, and internal medicine since last hospitalization. Upon my encounter today, patient presents to virtual visit by himself. His son Pennie Saeed is present "doing homework", but not actively involved in conversation. He appears short of breath which is his baseline, but in no acute distress. He reports his O2 has been ranging in the 90s-96% range. He denies any other symptoms including pain, nausea, vomiting, diarrhea, constipation, dizziness, syncope. His appetite has remained poor, but stable. He continues to utilize ensure shakes to supplement his nutrition. He discusses difficulties with his disease and states "I would never wish this on my worst enemy." He remains treatment focused and he wishes to return to hospital with exacerbations as he has done over past few months. He appears disheveled at today's visit which is similar to previous visits. He reports that he has his son and wife at home with him to help when needed.  Patient has struggled with having goals of care conversations without his wife present so did not specifically discuss code status today. Patient denies any other questions or concerns at today's visit. He is agreeable to virtual visit in approx 2 months with Jana LNI. He has palliative medicine phone number if any questions or concerns. Past Medical History:   Diagnosis Date   • Acute metabolic encephalopathy 16/37/1329   • Arthritis    • Bladder mass    • Cardiomyopathy [facility])    • Chest pain    • COPD (chronic obstructive pulmonary disease) ([facility])    • COVID-19 virus infection 02/23/2023   • CPAP (continuous positive airway pressure) dependence    • Emphysema of lung (HCC)    • Hypoxia     nocturnal   • Left bundle branch block    • Multiple pulmonary nodules     last assessed: 10/12/16   • Pneumonia    • Sleep apnea, obstructive    • Smoker    • Weight loss 08/29/2019     Past Surgical History:   Procedure Laterality Date   • COLONOSCOPY     • CYSTOSCOPY     • CYSTOSCOPY  02/17/2021   • MO BLADDER INSTILLATION ANTICARCINOGENIC AGENT N/A 1/3/2020    Procedure: INSTILLATION MITOMYCIN;  Surgeon: Natalie Espitia MD;  Location: [facility];  Service: Urology   • MO CYSTO W/REMOVAL OF LESIONS SMALL N/A 1/3/2020    Procedure: TRANSURETHRAL RESECTION OF BLADDER TUMOR (TURBT);   Surgeon: Natalie Espitia MD;  Location[facility];  Service: Urology   • MO CYSTOURETHROSCOPY WITH BIOPSY N/A 4/13/2021    Procedure: Maryanne Merlin;  Surgeon: Natalie Espitia MD;  [facility];  Service: Urology   • MO TRURL ELECTROSURG RESCJ PROSTATE BLEED COMPLETE N/A 4/13/2021    Procedure: TRANSURETHRAL RESECTION OF PROSTATE (TURP) BLADDER BIOPSY, FULGURATION;  Surgeon: Natalie Espitia MD;  [facility];  Service: Urology     Current Outpatient Medications   Medication Sig Dispense Refill   • acetaminophen (TYLENOL) 325 mg tablet Take 3 tablets (975 mg total) by mouth every 8 (eight) hours as needed for mild pain or moderate pain  0   • albuterol (PROVENTIL HFA,VENTOLIN HFA) 90 mcg/act inhaler Inhale 2 puffs every 4 (four) hours as needed for wheezing or shortness of breath 18 g 5   • Alcohol Swabs 70 % PADS May substitute brand based on insurance coverage. Check glucose TID. 100 each 0   • azithromycin (ZITHROMAX) 250 mg tablet Take 1 tablet (250 mg total) by mouth 3 (three) times a week Take 2 tablets today then 1 tablet daily x 4 days Do not start before August 7, 2023. 12 tablet 0   • Blood Glucose Monitoring Suppl (OneTouch Verio Reflect) w/Device KIT May substitute brand based on insurance coverage. Check glucose TID. 1 kit 0   • budesonide (PULMICORT) 0.5 mg/2 mL nebulizer solution TAKE 2 ML (0.5 MG TOTAL) BY NEBULIZATION TWICE A DAY RINSE MOUTH AFTER  mL 2   • cyanocobalamin (VITAMIN B-12) 1000 MCG tablet Take 1 tablet (1,000 mcg total) by mouth daily 30 tablet 0   • Diclofenac Sodium (VOLTAREN) 1 % APPLY 2 GRAMS 4 TIMES A DAY     • docusate sodium (COLACE) 100 mg capsule Take 1 capsule (100 mg total) by mouth 2 (two) times a day 60 capsule 0   • doxycycline (ADOXA) 100 MG tablet Take 1 tablet (100 mg total) by mouth 2 (two) times a day for 7 days 14 tablet 0   • famotidine (PEPCID) 20 mg tablet Take 1 tablet (20 mg total) by mouth 2 (two) times a day for 14 days 28 tablet 0   • formoterol (PERFOROMIST) 20 MCG/2ML nebulizer solution TAKE 2 ML (20 MCG TOTAL) BY NEBULIZATION 2 (TWO) TIMES A DAY     • glucose blood (OneTouch Verio) test strip May substitute brand based on insurance coverage. Check glucose TID. 100 each 0   • Insulin Pen Needle (BD Pen Needle Cathie 2nd Gen) 32G X 4 MM MISC For use with insulin pen. Pharmacy may dispense brand covered by insurance.  100 each 0   • insuln lispro (HumaLOG KwikPen) 200 units/mL CONCENTRATED U-200 injection pen Inject 3 Units under the skin 3 (three) times a day with meals 6 mL 0   • ipratropium (ATROVENT) 0.02 % nebulizer solution Take 2.5 mL (0.5 mg total) by nebulization every 6 (six) hours 300 mL 0   • levalbuterol (XOPENEX) 1.25 mg/3 mL nebulizer solution Take 3 mL (1.25 mg total) by nebulization every 6 (six) hours 360 mL 0   • Melatonin 5 MG TABS Take 1 tablet by mouth daily at bedtime     • NovoLOG FlexPen 100 units/mL injection pen INJECT 3 UNITS UDNER THE SKIN 3 TIMES A DAY WITH MEALS     • OneTouch Delica Lancets 15B MISC May substitute brand based on insurance coverage. Check glucose TID. 100 each 0   • potassium chloride (K-DUR,KLOR-CON) 20 mEq tablet Take 1 tablet (20 mEq total) by mouth 2 (two) times a day 60 tablet 0   • predniSONE 10 mg tablet Prednisone 10mg tablets PO taper. Taper by 10mg every three days. 4 tablets for 2 days;  3 tablets for 3 days; 2 tablets for 3 days, 1 tablets for 3 days Then stop (Patient not taking: Reported on 8/30/2023) 26 tablet 0   • predniSONE 5 mg tablet Take 1 tablet (5 mg total) by mouth daily Resume prednisone 5mg daily when taper is completed Do not start before August 8, 2023. 30 tablet 0   • roflumilast (Daliresp) 250 MCG tablet Take 1 tablet (250 mcg total) by mouth daily 30 tablet 4   • rosuvastatin (CRESTOR) 10 MG tablet Take 1 tablet (10 mg total) by mouth daily 90 tablet 3   • senna (SENOKOT) 8.6 mg Take 1 tablet (8.6 mg total) by mouth daily at bedtime 30 tablet 0   • sodium chloride (OCEAN) 0.65 % nasal spray 1 spray into each nostril every hour as needed for congestion 30 mL 0   • tamsulosin (FLOMAX) 0.4 mg Take 1 capsule (0.4 mg total) by mouth daily with dinner  0   • torsemide (DEMADEX) 10 mg tablet Take 4 tablets (40 mg total) by mouth daily 90 tablet 0     No current facility-administered medications for this visit. No Known Allergies    Review of Systems   All other systems reviewed and are negative. Video Exam  There were no vitals filed for this visit. Reports O2 sat is 90% on 4L    Physical Exam  Constitutional:       General: He is not in acute distress. Appearance: Normal appearance. He is ill-appearing. He is not diaphoretic. HENT:      Head: Normocephalic and atraumatic. Right Ear: External ear normal.      Left Ear: External ear normal.      Nose: Nose normal.   Eyes:      Conjunctiva/sclera: Conjunctivae normal.   Pulmonary:      Effort: No respiratory distress. Comments: Increased effort  Skin:     General: Skin is dry. Neurological:      General: No focal deficit present. Mental Status: He is alert and oriented to person, place, and time. Psychiatric:         Mood and Affect: Mood normal.         Behavior: Behavior normal.      Comments: Disheveled appearance           I spent 20+ minutes was spent during this virtual visit by Lynnette face to face with Marly France. with greater than 50% of the time spent in counseling or coordination of care including discussions of treatment instructions, follow up requirements, risk factors and risk reduction of disease, patient and family counseling/involvement in care and compliance with treatment regimen . All of the patient's or agent's questions were answered during this discussion. Encounter provider Myla Gutierrez PA-C, located at:  [address] [place] [address] [facility] [address] [place] [id number] [phone]    Recent Visits  No visits were found meeting these conditions. Showing recent visits within past 7 days and meeting all other requirements  Future Appointments  No visits were found meeting these conditions. Showing future appointments within next 150 days and meeting all other requirements       VIRTUAL VISIT DISCLAIMER    Marly France. or their agent has consented to an online visit or consultation. They understands that the online visit is based solely on information provided by the patient or agent, and that, in the absence of a face-to-face physical evaluation by the physician, the diagnosis they receive is both limited and provisional in terms of accuracy and completeness. This is not intended to replace a full medical face-to-face evaluation by the physician. Ramon Souza. or their agent understands and accepts these terms. The patient or their agent was informed this is a billable service.     This note was not shared with the patient due to privacy exception: note includes other individuals

## 2023-09-01 ENCOUNTER — TELEMEDICINE (OUTPATIENT)
Dept: PALLIATIVE MEDICINE | Facility: CLINIC | Age: 68
End: 2023-09-01
Payer: COMMERCIAL

## 2023-09-01 DIAGNOSIS — E43 SEVERE PROTEIN-CALORIE MALNUTRITION (HCC): ICD-10-CM

## 2023-09-01 DIAGNOSIS — Z51.5 PALLIATIVE CARE PATIENT: ICD-10-CM

## 2023-09-01 DIAGNOSIS — J96.22 ACUTE ON CHRONIC RESPIRATORY FAILURE WITH HYPOXIA AND HYPERCAPNIA (HCC): ICD-10-CM

## 2023-09-01 DIAGNOSIS — J44.1 COPD WITH ACUTE EXACERBATION (HCC): ICD-10-CM

## 2023-09-01 DIAGNOSIS — E46 PROTEIN-CALORIE MALNUTRITION, UNSPECIFIED SEVERITY (HCC): ICD-10-CM

## 2023-09-01 DIAGNOSIS — I50.22 CHRONIC HFREF (HEART FAILURE WITH REDUCED EJECTION FRACTION) (HCC): Primary | Chronic | ICD-10-CM

## 2023-09-01 DIAGNOSIS — J96.21 ACUTE ON CHRONIC RESPIRATORY FAILURE WITH HYPOXIA AND HYPERCAPNIA (HCC): ICD-10-CM

## 2023-09-01 PROCEDURE — 99213 OFFICE O/P EST LOW 20 MIN: CPT

## 2023-09-06 ENCOUNTER — OFFICE VISIT (OUTPATIENT)
Dept: CARDIOLOGY CLINIC | Facility: CLINIC | Age: 68
End: 2023-09-06
Payer: COMMERCIAL

## 2023-09-06 VITALS
BODY MASS INDEX: 17.8 KG/M2 | TEMPERATURE: 96.5 F | WEIGHT: 97.3 LBS | OXYGEN SATURATION: 95 % | HEART RATE: 96 BPM | SYSTOLIC BLOOD PRESSURE: 96 MMHG | DIASTOLIC BLOOD PRESSURE: 58 MMHG

## 2023-09-06 DIAGNOSIS — J44.9 COPD, VERY SEVERE (HCC): ICD-10-CM

## 2023-09-06 DIAGNOSIS — R05.1 ACUTE COUGH: ICD-10-CM

## 2023-09-06 DIAGNOSIS — I50.20 HFREF (HEART FAILURE WITH REDUCED EJECTION FRACTION) (HCC): Primary | ICD-10-CM

## 2023-09-06 PROCEDURE — 99214 OFFICE O/P EST MOD 30 MIN: CPT | Performed by: PHYSICIAN ASSISTANT

## 2023-09-06 NOTE — PATIENT INSTRUCTIONS
Increase torsemide to 40 mg twice a day for the next 3 days. Please weigh yourself every day (after emptying your bladder) and keep a detailed log of weights. Contact the Heart Failure program at [phone] if you gain 3+ lbs overnight or 5+ lbs in 5-7 days. Limit daily sodium/salt intake to 2000 mg daily to prevent fluid retention. Avoid canned foods, fast food/Chinese food, and processed meats (hot dogs, lunch meat, and sausage etc.). Caution with condiments. Limit fluid intake to 2000 mL or 2 liters (about 60-65 ounces) daily. Avoid electrolyte replacement drinks (such as Gatorade, Pedialyte, Propel, Liquid IV, etc.). Bring complete list of medications and log of daily weights to your follow-up appointment.

## 2023-09-06 NOTE — PROGRESS NOTES
Advanced Heart Failure / Pulmonary Hypertension Outpatient Visit    Nisreen Miller 79 y.o. male   MRN: [id number]  Encounter: [id number]    Assessment:  Patient Active Problem List    Diagnosis Date Noted   • Lung nodule 08/01/2023   • Elevated troponin 07/31/2023   • History of bladder cancer 07/31/2023   • Pulmonary cachexia due to COPD [facility])    • Chronic hypercapnia/KEVIN 99/72/3960   • Metabolic encephalopathy 60/19/3194   • End-stage COPD with acute exacerbation ([address]) 07/19/2023   • Palliative care patient 06/13/2023   • Alkalosis 06/12/2023   • Severe protein-calorie malnutrition  06/10/2023   • Hyperlipidemia 05/02/2023   • COPD exacerbation ([address]) 04/28/2023   • Steroid-induced hyperglycemia 03/30/2023   • Physical deconditioning 02/21/2023   • Chronic anemia 02/17/2023   • SIRS (systemic inflammatory response syndrome) 02/17/2023   • Hyponatremia 02/17/2023   • Chronic HFrEF (heart failure with reduced ejection fraction)  09/12/2022   • Protein-calorie malnutrition ([address]) 08/27/2022   • Acute on chronic respiratory failure with hypoxia and hypercapnia  03/27/2022   • Pulmonary nodules/lesions, multiple 03/27/2022   • Prostate cancer ([address]) 05/24/2021   • BPH with obstruction/lower urinary tract symptoms 04/13/2021   • Goals of care, counseling/discussion 02/25/2021   • Chronic respiratory failure with hypoxia and hypercapnia ([address]) 11/19/2020   • Macrocytosis without anemia 05/23/2019   • Other insomnia 02/18/2019   • GERD (gastroesophageal reflux disease) 01/05/2017   • Nonobstructive atherosclerosis of coronary artery 02/25/2016   • Mood disorder ([address]) 08/18/2015   • Prediabetes 02/19/2015   • Osteoporosis 10/14/2014   • Vitamin D deficiency 10/14/2014   • Nonischemic cardiomyopathy ([address]) 09/26/2014   • Left bundle-branch block 08/03/2013   • Osteoarthritis 08/03/2013   • Obstructive sleep apnea 07/29/2013   • COPD exacerbation 07/18/2012       Today's Plan:  • Volume up.  Increase torsemide to 40 mg BID x 3 days.   • Being treated for PNA with antibiotics per pulm. • Following with palliative. • Ensure shakes for protein intake. • Multiple recent hospitalizations for COPD exacerbations. • Not a candidate for advanced heart failure therapies. Not looking to pursue invasive measures at this time. • Virtual follow up 4 weeks. Plan:  Chronic HFrEF; LVEF 20%; NYHA III; ACC/AHA Stage C              Etiology: Nonischemic cardiomyopathy.  Possible dyssynchrony from chronic LBBB.  Despite QRS only being 120 ms, echo shows significant dyssynchrony. Recent drop in EF likely related to stress myopathy with acute exacerbations of COPD. Weighed 115 lb on 5/10, 105 lb on 5/16/23, 104 lb 5/18, 103 lbs, 96 lbs. Today, weighs 97 lbs.     TTE 5/1/23: LVEF 20%. Severe global hypokinesis with regional variation. RV cavity size normal, systolic function normal. Trace MR, TR.   TTE 2/21/23: LVEF 30%. LVIDd 6.6cm. severe global hypokinesis. Grade I DD. RV cavity size and systolic function normal. Mild TR. TTE 2/12/2023: LVEF 20-25%.  Severe global hypokinesis with regional variation.  Grade 1 DD.  Abnormal septal motion consistent with LBBB.  RV cavity mildly dilated, normal systolic function.  Mild MR, mild TR.  RVSP 38.  Dilated IVC. TTE 8/26/2022: LVEF 40%.  RV cavity mildly dilated.  Systolic function normal.  Mild MR, TR.  Normal IVC.     Neurohormonal Blockade:  --Beta Blocker: no  --ARNi / ACEi / [facility] 12.5 mg QD, off   --Aldosterone Antagonist: no   --SGLT2 Inhibitor: no  --Home Diuretic: torsemide 40 mg QD     Sudden Cardiac Death Risk Reduction:  --ICD: LVEF newly reduced to 20-25%.     Electrical Resynchronization:  --Candidacy for BiV device: QRSd 120ms, chronic LBBB with dyssynchrony on echocardiogram.     Advanced Therapies (if appropriate): Not appropriate at this time, particularly considering advanced lung disease.        COPD (end stage)  Former smoker; 105 pack years, quit in 2012.   Severe disease, follows closely with pulm.  On home oxygen. Multiple hospitalizations with acute COPD exacerbations   Management per pulm. COVID-19  Tested positive on 2/22/23.   Nonobstructive CAD  Has coronary calcium on CT  On aspirin and statin  Hyperlipidemia  Continue statin  LDL 75 8/2021  KEVIN   On BiPAP nightly  GOC. Introduced to palliative care team this past admit. Has seen them recently outpatient. HPI:   Saige Hanna Jr. is a 74year-old male who presented to [facility] on 2/12/2023 with an acute COPD exacerbation requiring intubation. Extubated 2/15/23.  PMH includes nonischemic cardiomyopathy, chronic systolic and diastolic heart failure, nonobstructive CAD, hyperlipidemia, severe COPD, KEVIN.  Was previously admitted in 8/2022 for acute decompensated heart failure and COPD exacerbation.  LVEF 40% at that time, down from prior of 45 to 50%, though previously EF had been 35% for years.  Started on Lasix at that time. Israel Salcedon been euvolemic, though blood pressures have made it difficult to start/titrate up on GDMT.  Recently saw Dr. Chandler Melissa in the office in December, at which time his Lasix was shifted to as needed and adequate oral intake and hydration were encouraged.     He was reportedly short of breath for 2 days prior to presentation, with hallucinations.  His wife called 911 and EMS intubated him in the field. [facility] was found to have an elevated procalcitonin on admission and started on azithromycin and ceftriaxone.  He was given IV Lasix with good urine output.  He was weaned off the ventilator and extubated on 2/15.  Maintained on BiPAP overnight and has been receiving scheduled nebulizers with aggressive pulmonary hygiene.  TTE on admission with LVEF 20 to 25%, down from 40% in 8/2022.      He is also followed by pulmonology, who recommended consideration for home hospice for severe Luis Eduardo Roxy was seen by palliative care while inpatient here, and patient and family elected to make code status level 3 DNR/DNI.  Pulmonology has been following him as well and has been managing steroids, bronchodilators, and nebulizer therapy. Per TH: 3/7/23. Presents for post hospital follow up with his wife. Just discharged from [facility]. Feeling at his baseline. Gets SOB with minimal exertion. O2 sats in the low 80's on 3-4 L NC,  on initial assessment today. Has complaints that his feet are red and swollen today. Wants to keep fighting. Dreading another hospital admission. Diuretics increased after visit with Ray Medina (Lasix increased to BID x 3 days.)    Recently hospitalized from 4/30-5/10/23 for acute respiratory failure in the setting of COPD. He was admitted initially to the ICU and treated with IV steroids, scheduled nebulized breathing treatments, and increased supplemental oxygen. He also required BiPAP. Ultimately he was stabilized and transition to the floor. Goals of care were discussed at length with the patient and his wife. He is currently a DNR/DNI. Plan per chart review per patient and his wife; plan with next exacerbation is to return to the hospital, but to transition to palliative care and comfort measures at that time. Referred to home hospice as of 5/12/23, wife expressed they would like to speak to palliative on Friday before making a final decision. 5/17/23: presents today for follow up. Feels at his baseline, O2 sats mid to high 90s on home 3L NC. Some LE swelling, improves with elevating his legs. Able to complete ADLs and move around the house, SOB not increased from his baseline. Meeting with palliative care tomorrow. Denies chest pain, palpitations, dizziness, syncope. Feels like he's urinating much more with torsemide than he did with Lasix. Weighs himself daily, small fluctuations but overall stable. 6/1/23: presents today for follow up. In respiratory distress, confused, tachypneic. Wife reports this has been worsening since this morning; no recent trigger, fevers, chills. +LE swelling. Confirmed at today's appointment that patient is currently FULL CODE. EMS called, transported via ambulance to ED, transfer order placed. 6/26/23: presents today for follow up. Hospitalized at [facility] from 6/1 to 6/12 for respiratory distress, treated for COPD exacerbation with steroids and breathing treatments, eventually weaned off BiPAP. Treated with IV Lasix as well. Reaffirmed full CODE STATUS. Doing better today, feeling improved since hospitalization. Appetite has been down, supplementing with Ensure. Minimal lower extremity swelling, shortness of breath at baseline. Urinating well with torsemide. EMS was called on 6/13 by [facility], as patient was hypoxic and short of breath on their arrival.  EMS checked oxygen per wife, reports that was above 92%. Does report that they noticed elevated blood sugar, so she has been checking his sugars at home and noting numbers in the 200s. No further EMS calls, has been tolerating medications. Hospitalized 7/19 through 7/21 and again 7/22 through 7/29 for COPD exacerbation. Hospitalized again at [facility] from 7/31 to 8/3, again for hypoxic respiratory failure in the setting of his home BiPAP not working. Home oxygen at 6L NC at rest and 8L with exertion, along with nightly BiPAP. Has appropriate supplies at home. 8/14/23: presents today for follow up. Breathing at baseline. On 3L home O2 at rest, 6L with exertion. Some swelling in feet and ankles. Urinating well with current dose of torsemide. Drinking Ensure, trying to gain caloric weight. Weight between 92-96 lbs at home. Would like to explore virtual visits when possible going forward. 9/6/23: Presents today for follow up. Being treated for pneumonia by pulm, occasional chills. Taking antibiotics. Increased his torsemide to 40 mg BID x 2 days with increased urination. Has +LE swelling, increased cough from baseline. Unable to fully cough up sputum. No fever. On consistent home O2.      Past Medical History:   Diagnosis Date   • Acute metabolic encephalopathy 27/30/2452   • Arthritis    • Bladder mass    • Cardiomyopathy [facility])    • Chest pain    • COPD (chronic obstructive pulmonary disease) ([facility])    • COVID-19 virus infection 02/23/2023   • CPAP (continuous positive airway pressure) dependence    • Emphysema of lung ([facility])    • Hypoxia     nocturnal   • Left bundle branch block    • Multiple pulmonary nodules     last assessed: 10/12/16   • Pneumonia    • Sleep apnea, obstructive    • Smoker    • Weight loss 08/29/2019     Review of Systems   Constitutional: Positive for chills. Negative for fever. HENT: Negative for ear pain. Eyes: Negative for pain. Respiratory: Positive for cough and shortness of breath (baseline). Cardiovascular: Positive for leg swelling. Negative for chest pain and palpitations. Gastrointestinal: Negative for abdominal pain and vomiting. Genitourinary: Negative for dysuria and hematuria. Musculoskeletal: Negative for arthralgias and back pain. Skin: Negative for rash. Neurological: Negative for seizures. All other systems reviewed and are negative. 14-point ROS completed and negative except as stated above and/or in the HPI. No Known Allergies    Current Outpatient Medications:   •  acetaminophen (TYLENOL) 325 mg tablet, Take 3 tablets (975 mg total) by mouth every 8 (eight) hours as needed for mild pain or moderate pain, Disp: , Rfl: 0  •  albuterol (PROVENTIL HFA,VENTOLIN HFA) 90 mcg/act inhaler, Inhale 2 puffs every 4 (four) hours as needed for wheezing or shortness of breath, Disp: 18 g, Rfl: 5  •  Alcohol Swabs 70 % PADS, May substitute brand based on insurance coverage.  Check glucose TID., Disp: 100 each, Rfl: 0  •  azithromycin (ZITHROMAX) 250 mg tablet, Take 1 tablet (250 mg total) by mouth 3 (three) times a week Take 2 tablets today then 1 tablet daily x 4 days Do not start before August 7, 2023., Disp: 12 tablet, Rfl: 0  •  Blood Glucose Monitoring Suppl (OneTouch Verio Reflect) w/Device KIT, May substitute brand based on insurance coverage. Check glucose TID., Disp: 1 kit, Rfl: 0  •  budesonide (PULMICORT) 0.5 mg/2 mL nebulizer solution, TAKE 2 ML (0.5 MG TOTAL) BY NEBULIZATION TWICE A DAY RINSE MOUTH AFTER USE, Disp: 360 mL, Rfl: 2  •  cyanocobalamin (VITAMIN B-12) 1000 MCG tablet, Take 1 tablet (1,000 mcg total) by mouth daily, Disp: 30 tablet, Rfl: 0  •  Diclofenac Sodium (VOLTAREN) 1 %, APPLY 2 GRAMS 4 TIMES A DAY, Disp: , Rfl:   •  docusate sodium (COLACE) 100 mg capsule, Take 1 capsule (100 mg total) by mouth 2 (two) times a day, Disp: 60 capsule, Rfl: 0  •  doxycycline (ADOXA) 100 MG tablet, Take 1 tablet (100 mg total) by mouth 2 (two) times a day for 7 days, Disp: 14 tablet, Rfl: 0  •  famotidine (PEPCID) 20 mg tablet, Take 1 tablet (20 mg total) by mouth 2 (two) times a day for 14 days, Disp: 28 tablet, Rfl: 0  •  formoterol (PERFOROMIST) 20 MCG/2ML nebulizer solution, TAKE 2 ML (20 MCG TOTAL) BY NEBULIZATION 2 (TWO) TIMES A DAY, Disp: , Rfl:   •  glucose blood (OneTouch Verio) test strip, May substitute brand based on insurance coverage. Check glucose TID., Disp: 100 each, Rfl: 0  •  Insulin Pen Needle (BD Pen Needle Cathie 2nd Gen) 32G X 4 MM MISC, For use with insulin pen.  Pharmacy may dispense brand covered by insurance., Disp: 100 each, Rfl: 0  •  insuln lispro (HumaLOG KwikPen) 200 units/mL CONCENTRATED U-200 injection pen, Inject 3 Units under the skin 3 (three) times a day with meals, Disp: 6 mL, Rfl: 0  •  ipratropium (ATROVENT) 0.02 % nebulizer solution, Take 2.5 mL (0.5 mg total) by nebulization every 6 (six) hours, Disp: 300 mL, Rfl: 0  •  levalbuterol (XOPENEX) 1.25 mg/3 mL nebulizer solution, Take 3 mL (1.25 mg total) by nebulization every 6 (six) hours, Disp: 360 mL, Rfl: 0  •  Melatonin 5 MG TABS, Take 1 tablet by mouth daily at bedtime, Disp: , Rfl:   •  NovoLOG FlexPen 100 units/mL injection pen, INJECT 3 UNITS UDNER THE SKIN 3 TIMES A DAY WITH MEALS, Disp: , Rfl:   •  OneTouch Delica Lancets 01Z MISC, May substitute brand based on insurance coverage. Check glucose TID., Disp: 100 each, Rfl: 0  •  potassium chloride (K-DUR,KLOR-CON) 20 mEq tablet, Take 1 tablet (20 mEq total) by mouth 2 (two) times a day, Disp: 60 tablet, Rfl: 0  •  predniSONE 5 mg tablet, Take 1 tablet (5 mg total) by mouth daily Resume prednisone 5mg daily when taper is completed Do not start before 2023., Disp: 30 tablet, Rfl: 0  •  roflumilast (Daliresp) 250 MCG tablet, Take 1 tablet (250 mcg total) by mouth daily, Disp: 30 tablet, Rfl: 4  •  rosuvastatin (CRESTOR) 10 MG tablet, Take 1 tablet (10 mg total) by mouth daily, Disp: 90 tablet, Rfl: 3  •  senna (SENOKOT) 8.6 mg, Take 1 tablet (8.6 mg total) by mouth daily at bedtime, Disp: 30 tablet, Rfl: 0  •  sodium chloride (OCEAN) 0.65 % nasal spray, 1 spray into each nostril every hour as needed for congestion, Disp: 30 mL, Rfl: 0  •  tamsulosin (FLOMAX) 0.4 mg, Take 1 capsule (0.4 mg total) by mouth daily with dinner, Disp: , Rfl: 0  •  torsemide (DEMADEX) 10 mg tablet, Take 4 tablets (40 mg total) by mouth daily, Disp: 90 tablet, Rfl: 0  •  predniSONE 10 mg tablet, Prednisone 10mg tablets PO taper. Taper by 10mg every three days.  4 tablets for 2 days;  3 tablets for 3 days; 2 tablets for 3 days, 1 tablets for 3 days Then stop (Patient not taking: Reported on 2023), Disp: 26 tablet, Rfl: 0    Social History     Socioeconomic History   • Marital status: /Civil Union     Spouse name: Not on file   • Number of children: Not on file   • Years of education: Not on file   • Highest education level: Not on file   Occupational History   • Not on file   Tobacco Use   • Smoking status: Former     Packs/day: 2.50     Years: 42.00     Total pack years: 105.00     Types: Cigarettes     Start date: 5     Quit date:      Years since quittin.6   • Smokeless tobacco: Former   • Tobacco comments: 1 ppd for 37 years, 2010 down to 5 cigs a day, is around second hand smoke   Vaping Use   • Vaping Use: Never used   Substance and Sexual Activity   • Alcohol use: Not Currently     Comment: rarely   • Drug use: No   • Sexual activity: Not Currently     Partners: Female   Other Topics Concern   • Not on file   Social History Narrative    Daily coffee consumption: 8 or more cups a day        Used to work in demolition. On disability. Social Determinants of Health     Financial Resource Strain: Not on file   Food Insecurity: No Food Insecurity (7/20/2023)    Hunger Vital Sign    • Worried About Running Out of Food in the Last Year: Never true    • Ran Out of Food in the Last Year: Never true   Transportation Needs: No Transportation Needs (7/20/2023)    PRAPARE - Transportation    • Lack of Transportation (Medical): No    • Lack of Transportation (Non-Medical): No   Physical Activity: Not on file   Stress: Not on file   Social Connections: Not on file   Intimate Partner Violence: Not on file   Housing Stability: Low Risk  (7/20/2023)    Housing Stability Vital Sign    • Unable to Pay for Housing in the Last Year: No    • Number of Places Lived in the Last Year: 1    • Unstable Housing in the Last Year: No     Family History   Problem Relation Age of Onset   • Diabetes Mother        Vitals:  Blood pressure 96/58, pulse 96, temperature (!) 96.5 °F (35.8 °C), temperature source Temporal, weight 44.1 kg (97 lb 4.8 oz), SpO2 95 %. Body mass index is 17.8 kg/m².   Wt Readings from Last 10 Encounters:   09/06/23 44.1 kg (97 lb 4.8 oz)   07/29/23 43.8 kg (96 lb 9.6 oz)   07/21/23 44.5 kg (98 lb 3.2 oz)   06/26/23 43.8 kg (96 lb 8 oz)   06/16/23 43.1 kg (95 lb)   06/12/23 47.8 kg (105 lb 4.8 oz)   06/01/23 46.7 kg (103 lb)   05/19/23 46.8 kg (103 lb 2.8 oz)   05/18/23 47.2 kg (104 lb)   05/16/23 48 kg (105 lb 12.8 oz)     Vitals:    09/06/23 1126   BP: 96/58   BP Location: Right arm   Patient Position: Sitting Cuff Size: Standard   Pulse: 96   Temp: (!) 96.5 °F (35.8 °C)   TempSrc: Temporal   SpO2: 95%   Weight: 44.1 kg (97 lb 4.8 oz)       Physical Exam  Constitutional:       General: He is not in acute distress. Appearance: He is ill-appearing. HENT:      Head: Normocephalic. Nose: Nose normal.      Mouth/Throat:      Mouth: Mucous membranes are moist.   Eyes:      Conjunctiva/sclera: Conjunctivae normal.   Neck:      Comments: JVP is up  Cardiovascular:      Rate and Rhythm: Normal rate and regular rhythm. Comments: Pitting edema distal LE   Pulmonary:      Breath sounds: Wheezing (expiratory) present. Comments: decreased air movement  Musculoskeletal:      Cervical back: Neck supple. Right lower leg: Edema present. Left lower leg: Edema present. Skin:     General: Skin is dry. Neurological:      General: No focal deficit present. Mental Status: He is alert. Psychiatric:         Mood and Affect: Mood normal.         Labs & Results:  Lab Results   Component Value Date    WBC 8.69 08/03/2023    HGB 9.1 (L) 08/03/2023    HCT 29.5 (L) 08/03/2023     (H) 08/03/2023     08/03/2023     Lab Results   Component Value Date    SODIUM 136 08/30/2023    K 4.8 08/30/2023    CL 87 (L) 08/30/2023    CO2 >45 (HH) 08/30/2023    BUN 16 08/30/2023    CREATININE 0.75 08/30/2023    GLUC 143 (H) 08/30/2023    CALCIUM 9.6 08/30/2023     Lab Results   Component Value Date    INR 0.78 (L) 07/19/2023    INR 0.84 06/01/2023    INR 0.93 02/12/2023    PROTIME 11.1 (L) 07/19/2023    PROTIME 11.7 06/01/2023    PROTIME 12.6 02/12/2023     Lab Results   Component Value Date     (H) 07/22/2023      Lab Results   Component Value Date    NTBNP 17,569 (H) 04/30/2023        Thank you for the opportunity to participate in the care of this patient.     Gustavo Cortez PA-C

## 2023-09-06 NOTE — PROGRESS NOTES
Virtual Regular Visit    Verification of patient location:    Patient is located at Home in the following state in which I hold an active license [place]      Assessment/Plan: Today's Plan:  • Continue current medications. • Being treated for PNA with antibiotics per pulm. • Following with palliative. • Reviewed sodium and fluid intake guidelines again today. Encouraged Ensure shakes for protein intake. • Multiple recent hospitalizations for COPD exacerbations. • Not a candidate for advanced heart failure therapies. Not looking to pursue invasive measures at this time. •      Plan:  Chronic HFrEF; LVEF 20%; NYHA III; ACC/AHA Stage C              Etiology: Nonischemic cardiomyopathy.  Possible dyssynchrony from chronic LBBB.  Despite QRS only being 120 ms, echo shows significant dyssynchrony. Recent drop in EF likely related to stress myopathy with acute exacerbations of COPD.     Weighed 115 lb on 5/10, 105 lb on 5/16/23, 104 lb 5/18, 103 lbs, 96 lbs. Reports *** lbs on home scale.      TTE 5/1/23: LVEF 20%. Severe global hypokinesis with regional variation. RV cavity size normal, systolic function normal. Trace MR, TR.   TTE 2/21/23: LVEF 30%. LVIDd 6.6cm. severe global hypokinesis. Grade I DD. RV cavity size and systolic function normal. Mild TR.   TTE 2/12/2023: LVEF 20-25%.  Severe global hypokinesis with regional variation.  Grade 1 DD.  Abnormal septal motion consistent with LBBB.  RV cavity mildly dilated, normal systolic function.  Mild MR, mild TR.  RVSP 38.  Dilated IVC.   TTE 8/26/2022: LVEF 40%.  RV cavity mildly dilated.  Systolic function normal.  Mild MR, TR.  Normal IVC.     Neurohormonal Blockade:  --Beta Blocker: no  --ARNi / ACEi / ARB: losartan 12.5 mg QD, off   --Aldosterone Antagonist: no   --SGLT2 Inhibitor: no  --Home Diuretic: torsemide 40 mg QD     Sudden Cardiac Death Risk Reduction:  --ICD: LVEF newly reduced to 20-25%.     Electrical Resynchronization:  --Candidacy for BiV device: QRSd 120ms, chronic LBBB with dyssynchrony on echocardiogram.     Advanced Therapies (if appropriate): Not appropriate at this time, particularly considering advanced lung disease.        COPD (end stage)  Former smoker; 105 pack years, quit in 2012. Severe disease, follows closely with pulm.  On home oxygen. Multiple hospitalizations with acute COPD exacerbations   Management per pulm. COVID-19  Tested positive on 2/22/23.   Nonobstructive CAD  Has coronary calcium on CT  On aspirin and statin  Hyperlipidemia  Continue statin  LDL 75 8/2021  KEVIN   On BiPAP nightly  GOC. Introduced to palliative care team this past admit. Has seen them recently outpatient.      HPI:   Prakash Miller Jr. is a 73-year-old male who presented to [facility] 2/12/2023 with an acute COPD exacerbation requiring intubation. Extubated 2/15/23.  PMH includes nonischemic cardiomyopathy, chronic systolic and diastolic heart failure, nonobstructive CAD, hyperlipidemia, severe COPD, KEVIN.  Was previously admitted in 8/2022 for acute decompensated heart failure and COPD exacerbation.  LVEF 40% at that time, down from prior of 45 to 50%, though previously EF had been 35% for years.  Started on Lasix at that time. Derek Mendezer been euvolemic, though blood pressures have made it difficult to start/titrate up on GDMT.  Recently saw Dr. Gregorio Hoyt in the office in December, at which time his Lasix was shifted to as needed and adequate oral intake and hydration were encouraged.     He was reportedly short of breath for 2 days prior to presentation, with hallucinations.  His wife called 911 and EMS intubated him in the field. [facility] was found to have an elevated procalcitonin on admission and started on azithromycin and ceftriaxone.  He was given IV Lasix with good urine output.  He was weaned off the ventilator and extubated on 2/15.  Maintained on BiPAP overnight and has been receiving scheduled nebulizers with aggressive pulmonary hygiene.  TTE on admission with LVEF 20 to 25%, down from 40% in 8/2022.      He is also followed by pulmonology, who recommended consideration for home hospice for severe Shaka Ulrich was seen by palliative care while inpatient here, and patient and family elected to make code status level 3 DNR/DNI.  Pulmonology has been following him as well and has been managing steroids, bronchodilators, and nebulizer therapy.     Per TH: 3/7/23. Presents for post hospital follow up with his wife. Just discharged from [facility]. Feeling at his baseline. Gets SOB with minimal exertion. O2 sats in the low 80's on 3-4 L NC,  on initial assessment today. Has complaints that his feet are red and swollen today. Wants to keep fighting. Dreading another hospital admission.      Diuretics increased after visit with Derrick Felix (Lasix increased to BID x 3 days.)     Recently hospitalized from 4/30-5/10/23 for acute respiratory failure in the setting of COPD. He was admitted initially to the ICU and treated with IV steroids, scheduled nebulized breathing treatments, and increased supplemental oxygen.  He also required BiPAP.  Ultimately he was stabilized and transition to the floor.  Goals of care were discussed at length with the patient and his wife. [facility] is currently a DNR/DNI. Plan per chart review per patient and his wife; plan with next exacerbation is to return to the hospital, but to transition to palliative care and comfort measures at that time. Referred to home hospice as of 5/12/23, wife expressed they would like to speak to palliative on Friday before making a final decision.      5/17/23: presents today for follow up. Feels at his baseline, O2 sats mid to high 90s on home 3L NC. Some LE swelling, improves with elevating his legs. Able to complete ADLs and move around the house, SOB not increased from his baseline. Meeting with palliative care tomorrow. Denies chest pain, palpitations, dizziness, syncope.  Feels like he's urinating much more with torsemide than he did with Lasix. Weighs himself daily, small fluctuations but overall stable.      6/1/23: presents today for follow up. In respiratory distress, confused, tachypneic. Wife reports this has been worsening since this morning; no recent trigger, fevers, chills. +LE swelling. Confirmed at today's appointment that patient is currently FULL CODE. EMS called, transported via ambulance to ED, transfer order placed.     6/26/23: presents today for follow up. Hospitalized at [facility] from 6/1 to 6/12 for respiratory distress, treated for COPD exacerbation with steroids and breathing treatments, eventually weaned off BiPAP. Treated with IV Lasix as well. Reaffirmed full CODE STATUS. Doing better today, feeling improved since hospitalization. Appetite has been down, supplementing with Ensure. Minimal lower extremity swelling, shortness of breath at baseline. Urinating well with torsemide. EMS was called on 6/13 by [facility], as patient was hypoxic and short of breath on their arrival.  EMS checked oxygen per wife, reports that was above 92%. Does report that they noticed elevated blood sugar, so she has been checking his sugars at home and noting numbers in the 200s. No further EMS calls, has been tolerating medications.      Hospitalized 7/19 through 7/21 and again 7/22 through 7/29 for COPD exacerbation.  Hospitalized again at [facility] from 7/31 to 8/3, again for hypoxic respiratory failure in the setting of his home BiPAP not working. Home oxygen at 6L NC at rest and 8L with exertion, along with nightly BiPAP. Has appropriate supplies at home.      8/14/23: presents today for follow up. Breathing at baseline. On 3L home O2 at rest, 6L with exertion. Some swelling in feet and ankles. Urinating well with current dose of torsemide. Drinking Ensure, trying to gain caloric weight. Weight between 92-96 lbs at home. Would like to explore virtual visits when possible going forward.      9/6/23: Presents today for virtual visit for follow up. Problem List Items Addressed This Visit    None           Reason for visit is No chief complaint on file. Encounter provider Vimal Alejandre PA-C    Provider located at [address] [id number]      Recent Visits  No visits were found meeting these conditions. Showing recent visits within past 7 days and meeting all other requirements  Future Appointments  No visits were found meeting these conditions. Showing future appointments within next 150 days and meeting all other requirements       The patient was identified by name and date of birth. Victor Manuel Haro was informed that this is a telemedicine visit and that the visit is being conducted through the [employer]. He agrees to proceed. .  My office door was closed. No one else was in the room. He acknowledged consent and understanding of privacy and security of the video platform. The patient has agreed to participate and understands they can discontinue the visit at any time. Patient is aware this is a billable service. Subjective  Victor Manuel Haro is a 79 y.o. male *** .       HPI     Past Medical History:   Diagnosis Date   • Acute metabolic encephalopathy 99/39/5620   • Arthritis    • Bladder mass    • Cardiomyopathy [facility])    • Chest pain    • COPD (chronic obstructive pulmonary disease) (HCC)    • COVID-19 virus infection 02/23/2023   • CPAP (continuous positive airway pressure) dependence    • Emphysema of lung (HCC)    • Hypoxia     nocturnal   • Left bundle branch block    • Multiple pulmonary nodules     last assessed: 10/12/16   • Pneumonia    • Sleep apnea, obstructive    • Smoker    • Weight loss 08/29/2019       Past Surgical History:   Procedure Laterality Date   • COLONOSCOPY     • CYSTOSCOPY     • CYSTOSCOPY  02/17/2021   • RI BLADDER INSTILLATION ANTICARCINOGENIC AGENT N/A 1/3/2020    Procedure: INSTILLATION MITOMYCIN;  Surgeon: Lo Abreu MD;  Location: [facility];  Service: Urology   • NE CYSTO W/REMOVAL OF LESIONS SMALL N/A 1/3/2020    Procedure: TRANSURETHRAL RESECTION OF BLADDER TUMOR (TURBT); Surgeon: Lo Abreu MD;  Location[facility];  Service: Urology   • NE CYSTOURETHROSCOPY WITH BIOPSY N/A 4/13/2021    Procedure: CYSTOSCOPY WITH BIOPSIES;  Surgeon: Lo Abreu MD;  [facility];  Service: Urology   • NE TRURL ELECTROSURG [address] PROSTATE BLEED COMPLETE N/A 4/13/2021    Procedure: TRANSURETHRAL RESECTION OF PROSTATE (TURP) BLADDER BIOPSY, FULGURATION;  Surgeon: Lo Abreu MD;  Location: [facility];  Service: Urology       Current Outpatient Medications   Medication Sig Dispense Refill   • acetaminophen (TYLENOL) 325 mg tablet Take 3 tablets (975 mg total) by mouth every 8 (eight) hours as needed for mild pain or moderate pain  0   • albuterol (PROVENTIL HFA,VENTOLIN HFA) 90 mcg/act inhaler Inhale 2 puffs every 4 (four) hours as needed for wheezing or shortness of breath 18 g 5   • Alcohol Swabs 70 % PADS May substitute brand based on insurance coverage. Check glucose TID. 100 each 0   • azithromycin (ZITHROMAX) 250 mg tablet Take 1 tablet (250 mg total) by mouth 3 (three) times a week Take 2 tablets today then 1 tablet daily x 4 days Do not start before August 7, 2023. 12 tablet 0   • Blood Glucose Monitoring Suppl (OneTouch Verio Reflect) w/Device KIT May substitute brand based on insurance coverage.  Check glucose TID. 1 kit 0   • budesonide (PULMICORT) 0.5 mg/2 mL nebulizer solution TAKE 2 ML (0.5 MG TOTAL) BY NEBULIZATION TWICE A DAY RINSE MOUTH AFTER  mL 2   • cyanocobalamin (VITAMIN B-12) 1000 MCG tablet Take 1 tablet (1,000 mcg total) by mouth daily 30 tablet 0   • Diclofenac Sodium (VOLTAREN) 1 % APPLY 2 GRAMS 4 TIMES A DAY     • docusate sodium (COLACE) 100 mg capsule Take 1 capsule (100 mg total) by mouth 2 (two) times a day 60 capsule 0   • doxycycline (ADOXA) 100 MG tablet Take 1 tablet (100 mg total) by mouth 2 (two) times a day for 7 days 14 tablet 0   • famotidine (PEPCID) 20 mg tablet Take 1 tablet (20 mg total) by mouth 2 (two) times a day for 14 days 28 tablet 0   • formoterol (PERFOROMIST) 20 MCG/2ML nebulizer solution TAKE 2 ML (20 MCG TOTAL) BY NEBULIZATION 2 (TWO) TIMES A DAY     • glucose blood (OneTouch Verio) test strip May substitute brand based on insurance coverage. Check glucose TID. 100 each 0   • Insulin Pen Needle (BD Pen Needle Cathie 2nd Gen) 32G X 4 MM MISC For use with insulin pen. Pharmacy may dispense brand covered by insurance. 100 each 0   • insuln lispro (HumaLOG KwikPen) 200 units/mL CONCENTRATED U-200 injection pen Inject 3 Units under the skin 3 (three) times a day with meals 6 mL 0   • ipratropium (ATROVENT) 0.02 % nebulizer solution Take 2.5 mL (0.5 mg total) by nebulization every 6 (six) hours 300 mL 0   • levalbuterol (XOPENEX) 1.25 mg/3 mL nebulizer solution Take 3 mL (1.25 mg total) by nebulization every 6 (six) hours 360 mL 0   • Melatonin 5 MG TABS Take 1 tablet by mouth daily at bedtime     • NovoLOG FlexPen 100 units/mL injection pen INJECT 3 UNITS UDNER THE SKIN 3 TIMES A DAY WITH MEALS     • OneTouch Delica Lancets 10D MISC May substitute brand based on insurance coverage. Check glucose TID. 100 each 0   • potassium chloride (K-DUR,KLOR-CON) 20 mEq tablet Take 1 tablet (20 mEq total) by mouth 2 (two) times a day 60 tablet 0   • predniSONE 10 mg tablet Prednisone 10mg tablets PO taper. Taper by 10mg every three days.  4 tablets for 2 days;  3 tablets for 3 days; 2 tablets for 3 days, 1 tablets for 3 days Then stop (Patient not taking: Reported on 8/30/2023) 26 tablet 0   • predniSONE 5 mg tablet Take 1 tablet (5 mg total) by mouth daily Resume prednisone 5mg daily when taper is completed Do not start before August 8, 2023. 30 tablet 0   • roflumilast (Daliresp) 250 MCG tablet Take 1 tablet (250 mcg total) by mouth daily 30 tablet 4   • rosuvastatin (CRESTOR) 10 MG tablet Take 1 tablet (10 mg total) by mouth daily 90 tablet 3   • senna (SENOKOT) 8.6 mg Take 1 tablet (8.6 mg total) by mouth daily at bedtime 30 tablet 0   • sodium chloride (OCEAN) 0.65 % nasal spray 1 spray into each nostril every hour as needed for congestion 30 mL 0   • tamsulosin (FLOMAX) 0.4 mg Take 1 capsule (0.4 mg total) by mouth daily with dinner  0   • torsemide (DEMADEX) 10 mg tablet Take 4 tablets (40 mg total) by mouth daily 90 tablet 0     No current facility-administered medications for this visit. No Known Allergies    Review of Systems    Video Exam    There were no vitals filed for this visit.     Physical Exam     Visit Time  Total Visit Duration: ***

## 2023-09-13 DIAGNOSIS — J43.9 EMPHYSEMA, UNSPECIFIED (HCC): ICD-10-CM

## 2023-09-14 RX ORDER — FORMOTEROL FUMARATE 20 UG/2ML
SOLUTION RESPIRATORY (INHALATION)
Qty: 120 ML | Refills: 5 | Status: SHIPPED | OUTPATIENT
Start: 2023-09-14

## 2023-09-18 RX ORDER — ERGOCALCIFEROL 1.25 MG/1
CAPSULE ORAL
COMMUNITY
Start: 2023-09-06

## 2023-09-19 ENCOUNTER — OFFICE VISIT (OUTPATIENT)
Dept: PULMONOLOGY | Facility: CLINIC | Age: 68
End: 2023-09-19
Payer: COMMERCIAL

## 2023-09-19 ENCOUNTER — TELEPHONE (OUTPATIENT)
Dept: PALLIATIVE MEDICINE | Facility: CLINIC | Age: 68
End: 2023-09-19

## 2023-09-19 ENCOUNTER — TELEPHONE (OUTPATIENT)
Dept: OTHER | Facility: HOSPITAL | Age: 68
End: 2023-09-19

## 2023-09-19 VITALS
TEMPERATURE: 97.5 F | DIASTOLIC BLOOD PRESSURE: 60 MMHG | OXYGEN SATURATION: 96 % | HEART RATE: 92 BPM | SYSTOLIC BLOOD PRESSURE: 112 MMHG

## 2023-09-19 DIAGNOSIS — J96.12 CHRONIC RESPIRATORY FAILURE WITH HYPOXIA AND HYPERCAPNIA (HCC): ICD-10-CM

## 2023-09-19 DIAGNOSIS — J96.11 CHRONIC RESPIRATORY FAILURE WITH HYPOXIA AND HYPERCAPNIA (HCC): ICD-10-CM

## 2023-09-19 DIAGNOSIS — J43.9 EMPHYSEMA, UNSPECIFIED (HCC): ICD-10-CM

## 2023-09-19 DIAGNOSIS — G47.33 OBSTRUCTIVE SLEEP APNEA: Primary | ICD-10-CM

## 2023-09-19 DIAGNOSIS — J44.9 PULMONARY CACHEXIA DUE TO COPD (HCC): ICD-10-CM

## 2023-09-19 DIAGNOSIS — R64 PULMONARY CACHEXIA DUE TO COPD (HCC): ICD-10-CM

## 2023-09-19 DIAGNOSIS — J44.1 CHRONIC OBSTRUCTIVE PULMONARY DISEASE WITH ACUTE EXACERBATION (HCC): ICD-10-CM

## 2023-09-19 PROCEDURE — 99215 OFFICE O/P EST HI 40 MIN: CPT | Performed by: NURSE PRACTITIONER

## 2023-09-19 RX ORDER — IPRATROPIUM BROMIDE AND ALBUTEROL SULFATE 2.5; .5 MG/3ML; MG/3ML
3 SOLUTION RESPIRATORY (INHALATION) 4 TIMES DAILY
Qty: 180 ML | Refills: 5 | Status: SHIPPED | OUTPATIENT
Start: 2023-09-19

## 2023-09-19 RX ORDER — BUDESONIDE 1 MG/2ML
1 INHALANT ORAL DAILY
Qty: 120 ML | Refills: 5 | Status: SHIPPED | OUTPATIENT
Start: 2023-09-19

## 2023-09-19 NOTE — PROGRESS NOTES
Progress Note - Pulmonary   Susan Patella. 79 y.o. male MRN: [id number]  Unit/Bed#:  Encounter: [id number]    Assessment/Plan:    1. Very severe end-stage COPD        -Patient has persistent shortness of breath, sputum production, dyspnea        -Unfortunately patient's treatment options moving forward are rather limited        -He reports no real benefit from prednisone or chronic azithromycin        -We will increase his budesonide to 1 mg twice daily        -Transition to DuoNeb 4 times daily        -Spoke with [facility]-patient is able to obtain Daliresp on 9/22- for 25$-to 50 mg daily        -We will continue budesonide/Perforomist twice daily, DuoNeb 4 times daily, prednisone 5 mg        -I spent a significant amount of time with patient's wife discussing goals of care and the likely trajectory of his diagnosis         -Patient's wife reports that he is having a difficult time accepting his current prognosis    2. Chronic hypoxic and hypercapnic respiratory failure        -Patient currently on 4 L at rest 6 L upon ambulating        -Patient reports hypoxic events when he wakes up in the morning with his BiPAP        -We will order overnight pulse ox        -Continue saturations greater than 88%    3. KEVIN          -IPAP 12 cm of H2O/EPAP 5 cm of H2O          -Patient reports 100% compliance with his BiPAP            4.  Pulmonary cachexia        -Patient continues to lose a significant amount of weight        -I have discussed with palliative will move up his appointment        -May consider appetite stimulant          Chief Complaint:    "I have have so much mucus that is difficult to expectorate"    Subjective:    Mynor Ireland is here for sick visit today. Patient reports that he is having some hypoxic events throughout the night when he wakes up in the morning he notices saturations are in the 80s. Have ordered overnight pulse ox.   Patient also reports that he is still having difficult time expectorating his sputum. I had a lengthy discussion with the patient that I do feel goals of care moving forward will be appropriate. I spoke with palliative care they will move up his appointment to this Friday. Also they are able to obtain their Daliresp September 22 failed chronic azithromycin. Finally we will transition to DuoNeb and increase his budesonide to 1 mg twice daily. No further imaging indicated at this point as he is already completed 2 courses of antibiotics within 30 days. Objective:    Vitals: Blood pressure 112/60, pulse 92, temperature 97.5 °F (36.4 °C), SpO2 96 %.ra,There is no height or weight on file to calculate BMI. [unfilled]    Invasive Devices     Drain  Duration           External Urinary Catheter 50 days                Physical Exam:   Physical Exam  Constitutional:       General: He is not in acute distress. Appearance: Normal appearance. He is normal weight. He is not ill-appearing. HENT:      Head: Normocephalic and atraumatic. Nose: Nose normal. No congestion or rhinorrhea. Mouth/Throat:      Mouth: Mucous membranes are dry. Pharynx: Oropharynx is clear. No oropharyngeal exudate or posterior oropharyngeal erythema. Cardiovascular:      Rate and Rhythm: Normal rate and regular rhythm. Pulses: Normal pulses. Heart sounds: Normal heart sounds. No murmur heard. No friction rub. No gallop. Pulmonary:      Effort: Pulmonary effort is normal. No tachypnea, bradypnea, accessory muscle usage or respiratory distress. Breath sounds: Decreased air movement present. No stridor. Decreased breath sounds present. No wheezing, rhonchi or rales. Comments: Diminished aeration at bases  Chest:      Chest wall: No tenderness. Abdominal:      General: Abdomen is flat. Bowel sounds are normal. There is no distension. Palpations: Abdomen is soft. There is no mass. Musculoskeletal:         General: No swelling or tenderness. Normal range of motion. Cervical back: Normal range of motion. No rigidity or tenderness. Skin:     General: Skin is warm and dry. Coloration: Skin is not jaundiced or pale. Neurological:      General: No focal deficit present. Mental Status: He is alert and oriented to person, place, and time. Mental status is at baseline. Psychiatric:         Mood and Affect: Mood normal.         Behavior: Behavior normal.         Labs: I have personally reviewed pertinent lab results. , BNP: No results found for: "BNP", CBC: No results found for: "WBC", "HGB", "HCT", "MCV", "PLT", "ADJUSTEDWBC", "RBC", "MCH", "MCHC", "RDW", "MPV", "NRBC", PT/INR: No results found for: "PT", "INR"      Imaging and other studies: I have personally reviewed pertinent films in PACS     Chest x-ray-mild patchy inflammatory opacity in right lung      Results: 8/31/2020  FEV1/FVC Ratio: 30 %  Forced Vital Capacity: 2.00 L    59 % predicted  FEV1: 0.60 L     22 % predicted        After administration of bronchodilator   FVC: 2.43 L, 72 % predicted, 21 % change  FEV1: 0.66 L, 25 % predicted, 11 % change     Lung volumes by body plethysmography:   Total Lung Capacity 124 % predicted   Residual volume 243 % predicted     DLCO corrected for patients hemoglobin level: 45 %     Interpretation:     • Very severe obstructive airflow defect on spirometry     • Significant improvement in airflow or forced vital capacity in response to the administration of bronchodilator per ATS standards.     • Air trapping and hyperinflation as indicated by the lung volumes     • Moderate decrease in diffusion capacity     • Flow-volume loop consistent with obstruction        6 minute walk test   The patient was 95% on room air at rest and resting HR was 95bpm with dyspnea scale of 4/10. He completed 274m of walking with the lowest saturation at 87% requiring 2lpm of oxygen for which the lowest O2 saturation was 90%.  End of test HR was 129bpm and dyspnea scale was 9/10.

## 2023-09-19 NOTE — TELEPHONE ENCOUNTER
Can you please let patient know that I was able to get a hold of palliative and they will be calling later today I believe they have appointment as early as this Friday

## 2023-09-25 ENCOUNTER — TELEPHONE (OUTPATIENT)
Dept: PULMONOLOGY | Facility: CLINIC | Age: 68
End: 2023-09-25

## 2023-09-25 DIAGNOSIS — I50.22 CHRONIC HFREF (HEART FAILURE WITH REDUCED EJECTION FRACTION) (HCC): ICD-10-CM

## 2023-09-25 RX ORDER — TORSEMIDE 10 MG/1
20 TABLET ORAL DAILY
Qty: 60 TABLET | Refills: 0 | Status: SHIPPED | OUTPATIENT
Start: 2023-09-25 | End: 2023-09-27 | Stop reason: SDUPTHER

## 2023-09-25 NOTE — TELEPHONE ENCOUNTER
Mellisa AGUILERA on behalf of pt requesting refills of pt's torsemide. Upon review our providers did not prescribe this medication. Are you able to send in refills for pt to pharmacy on file?

## 2023-09-27 ENCOUNTER — OFFICE VISIT (OUTPATIENT)
Dept: NEPHROLOGY | Facility: CLINIC | Age: 68
End: 2023-09-27
Payer: COMMERCIAL

## 2023-09-27 VITALS — SYSTOLIC BLOOD PRESSURE: 100 MMHG | DIASTOLIC BLOOD PRESSURE: 60 MMHG

## 2023-09-27 DIAGNOSIS — E87.1 HYPONATREMIA: Primary | ICD-10-CM

## 2023-09-27 DIAGNOSIS — R06.89 HYPERCAPNIA: ICD-10-CM

## 2023-09-27 DIAGNOSIS — Z79.4 TYPE 2 DIABETES MELLITUS WITHOUT COMPLICATION, WITH LONG-TERM CURRENT USE OF INSULIN (HCC): ICD-10-CM

## 2023-09-27 DIAGNOSIS — J96.11 CHRONIC RESPIRATORY FAILURE WITH HYPOXIA AND HYPERCAPNIA (HCC): ICD-10-CM

## 2023-09-27 DIAGNOSIS — E11.9 TYPE 2 DIABETES MELLITUS WITHOUT COMPLICATION, WITH LONG-TERM CURRENT USE OF INSULIN (HCC): ICD-10-CM

## 2023-09-27 DIAGNOSIS — E87.79 OTHER HYPERVOLEMIA: ICD-10-CM

## 2023-09-27 DIAGNOSIS — J96.12 CHRONIC RESPIRATORY FAILURE WITH HYPOXIA AND HYPERCAPNIA (HCC): ICD-10-CM

## 2023-09-27 DIAGNOSIS — E43 SEVERE PROTEIN-CALORIE MALNUTRITION (HCC): ICD-10-CM

## 2023-09-27 DIAGNOSIS — I50.22 CHRONIC HFREF (HEART FAILURE WITH REDUCED EJECTION FRACTION) (HCC): ICD-10-CM

## 2023-09-27 PROCEDURE — 99214 OFFICE O/P EST MOD 30 MIN: CPT | Performed by: STUDENT IN AN ORGANIZED HEALTH CARE EDUCATION/TRAINING PROGRAM

## 2023-09-27 RX ORDER — TORSEMIDE 10 MG/1
20 TABLET ORAL DAILY
Qty: 90 TABLET | Refills: 2 | Status: SHIPPED | OUTPATIENT
Start: 2023-09-27 | End: 2023-09-28

## 2023-09-27 NOTE — PROGRESS NOTES
NEPHROLOGY OUTPATIENT PROGRESS NOTE   Tamiko Duncan. 79 y.o. male MRN: [id number]  DATE: 10/1/2023    Reason for visit:   Chief Complaint   Patient presents with   • Hospital Follow-up        Patient Instructions   Thank you for coming to your visit today. As we discussed your sodium is stable, your sodium is 137 mEq/L. Please follow the recommendations below       • Continue fluid restriction 1.5 to 1.8 L    • Try to exercise at least 30 minutes 3 days a week to begin with with an ultimate goal of 5 days a week for at least 30 minutes  • Continue Torsemide 20mg a day. Take an extra 1/2 dose in the afternoon if weight gain or more fluid retention     Next Visit in 5-6 months with results   If you need to see us earlier we can change the appointment for you      Sho Pelaez MD  Nephrology Attending           Jessee Fleischer was seen today for hospital follow-up. Diagnoses and all orders for this visit:    Hyponatremia  -     Basic metabolic panel; Future    Chronic HFrEF (heart failure with reduced ejection fraction) (HCC)  -     Discontinue: torsemide (DEMADEX) 10 mg tablet; Take 2 tablets (20 mg total) by mouth daily    Chronic respiratory failure with hypoxia and hypercapnia (HCC)    Other hypervolemia    Type 2 diabetes mellitus without complication, with long-term current use of insulin (HCC)    Hypercapnia    Severe protein-calorie malnutrition (HCC)        Assessment/Plan:  79 y.o with PMH of end-stage COPD, CHF, CAD and hyponatremia. Patient was admitted with altered mental status, respiratory distress complicated with hyponatremia.    patient is here for follow-up after hospital discharge     PLAN:     #Hyposomolar Hyponatremia/SIADH  · Sodium on admission: 119  • Serum osm: 273  • Urine osm: 261   • Urine Sodium: 38  • Etiology: Multifactorial, secondary to fluid overload with possible component of SIADH   • Plan:  • Start torsemide 10 mg daily  • Fluid restriction 1.5 to 1.9 L  • No indication of sodium tablets at this time  • We will repeat BMP    #Volume status/hypertension:  • Volume: Hypervolemic  • Blood pressure: Normotensive, /60mmHg   • Recommend:  • Torsemide 10    #COPD/chronic respiratory failure  · Chronic respiratory failure    #Protein calorie malnutrition  · Cachectic  · BMI 17.8  · Protein supplements    #DM  · HbA1c 6.5  · insulin  · Advised to maintain a good DM control to prevent progression of CKD   • Maintain healthy diet (vegetables, fruits, whole grains, nonfat or low fat)  • Weight loss  • Physical activity (5 to 10 minutes to start the increase to 30 min a day)    # hypercapnia  ·  elevated PCO2  ·  severe COPD    #CHF  · Hypervolemic  · Torsemide as above    SUBJECTIVE / INTERVAL HISTORY:  79 y.o. male presents in follow up of hyponatremia. Patient was needed complicated with hyponatremia. Feels well, no chest pain    Kiki Acharya. denies any recent illness/hospitalizations/medication changes since last office visit. Review of Systems   Constitutional: Negative for activity change and appetite change. HENT: Negative for congestion and dental problem. Eyes: Negative for discharge. Respiratory: Negative for shortness of breath and stridor. Cardiovascular: Negative for chest pain and leg swelling. Gastrointestinal: Negative for abdominal distention and abdominal pain. Endocrine: Negative for cold intolerance. Genitourinary: Negative for dysuria. Musculoskeletal: Negative for arthralgias and back pain. Skin: Negative for color change and pallor. Neurological: Negative for dizziness. Psychiatric/Behavioral: Negative for agitation. OBJECTIVE:  /60 (BP Location: Left arm, Patient Position: Sitting, Cuff Size: Adult)  There is no height or weight on file to calculate BMI.     Physical exam:  Physical Exam   General:  no acute distress at this time cachectic  Skin:  No acute rash  Eyes:  No scleral icterus and noninjected  ENT:  mucous membranes moist  Neck:  no carotid bruits  Chest:  Clear to auscultation percussion, good respiratory effort, no use of accessory respiratory muscles  CVS:  Regular rate and rhythm without rub   Abdomen:  soft and nontender   Extremities: lower extremity edema  Neuro:  No gross focality  Psych:  Alert , cooperative       Medications:    Current Outpatient Medications:   •  albuterol (PROVENTIL HFA,VENTOLIN HFA) 90 mcg/act inhaler, Inhale 2 puffs every 4 (four) hours as needed for wheezing or shortness of breath, Disp: 18 g, Rfl: 5  •  budesonide (Pulmicort) 1 MG/2ML nebulizer solution, Take 2 mL (1 mg total) by nebulization daily Rinse mouth after use., Disp: 120 mL, Rfl: 5  •  cyanocobalamin (VITAMIN B-12) 1000 MCG tablet, Take 1 tablet (1,000 mcg total) by mouth daily, Disp: 30 tablet, Rfl: 0  •  Diclofenac Sodium (VOLTAREN) 1 %, APPLY 2 GRAMS 4 TIMES A DAY, Disp: , Rfl:   •  ergocalciferol (VITAMIN D2) 50,000 units, , Disp: , Rfl:   •  formoterol (PERFOROMIST) 20 MCG/2ML nebulizer solution, TAKE 2 ML (20 MCG TOTAL) BY NEBULIZATION 2 (TWO) TIMES A DAY, Disp: 120 mL, Rfl: 5  •  ipratropium-albuterol (DUO-NEB) 0.5-2.5 mg/3 mL nebulizer solution, Take 3 mL by nebulization 4 (four) times a day, Disp: 180 mL, Rfl: 5  •  Melatonin 5 MG TABS, Take 1 tablet by mouth daily at bedtime, Disp: , Rfl:   •  predniSONE 5 mg tablet, Take 1 tablet (5 mg total) by mouth daily Resume prednisone 5mg daily when taper is completed Do not start before August 8, 2023., Disp: 30 tablet, Rfl: 0  •  rosuvastatin (CRESTOR) 10 MG tablet, Take 1 tablet (10 mg total) by mouth daily, Disp: 90 tablet, Rfl: 3  •  sodium chloride (OCEAN) 0.65 % nasal spray, 1 spray into each nostril every hour as needed for congestion, Disp: 30 mL, Rfl: 0  •  Insulin Pen Needle (BD Pen Needle Cathie 2nd Gen) 32G X 4 MM MISC, For use with insulin pen. Pharmacy may dispense brand covered by insurance.  (Patient not taking: Reported on 9/19/2023), Disp: 100 each, Rfl: 0  •  insuln lispro (HumaLOG KwikPen) 200 units/mL CONCENTRATED U-200 injection pen, Inject 3 Units under the skin 3 (three) times a day with meals (Patient not taking: Reported on 9/19/2023), Disp: 6 mL, Rfl: 0  •  NovoLOG FlexPen 100 units/mL injection pen, INJECT 3 UNITS UDNER THE SKIN 3 TIMES A DAY WITH MEALS (Patient not taking: Reported on 9/19/2023), Disp: , Rfl:   •  OneTouch Delica Lancets [facility], May substitute brand based on insurance coverage. Check glucose TID. (Patient not taking: Reported on 9/19/2023), Disp: 100 each, Rfl: 0  •  predniSONE 10 mg tablet, Prednisone 10mg tablets PO taper. Taper by 10mg every three days. 4 tablets for 2 days;  3 tablets for 3 days; 2 tablets for 3 days, 1 tablets for 3 days Then stop (Patient not taking: Reported on 8/30/2023), Disp: 26 tablet, Rfl: 0  •  roflumilast (Daliresp) 250 MCG tablet, Take 1 tablet (250 mcg total) by mouth daily (Patient not taking: Reported on 9/19/2023), Disp: 30 tablet, Rfl: 4  •  tamsulosin (FLOMAX) 0.4 mg, Take 1 capsule (0.4 mg total) by mouth daily with dinner (Patient not taking: Reported on 9/19/2023), Disp: , Rfl: 0  •  torsemide (DEMADEX) 10 mg tablet, Take 4 tablets (40 mg total) by mouth daily, Disp: 120 tablet, Rfl: 3    Allergies: Allergies as of 09/27/2023   • (No Known Allergies)       The following portions of the patient's history were reviewed and updated as appropriate: past family history, past surgical history and problem list.    Laboratory Results:  Lab Results   Component Value Date    SODIUM 136 08/30/2023    K 4.8 08/30/2023    CL 87 (L) 08/30/2023    CO2 >45 (HH) 08/30/2023    BUN 16 08/30/2023    CREATININE 0.75 08/30/2023    GLUC 143 (H) 08/30/2023    CALCIUM 9.6 08/30/2023        Lab Results   Component Value Date    CALCIUM 9.6 08/30/2023    PHOS 2.6 08/03/2023       Portions of the record may have been created with voice recognition software.   Occasional wrong word or "sound a like" substitutions may have occurred due to the inherent limitations of voice recognition software. Read the chart carefully and recognize, using context, where substitutions have occurred.

## 2023-09-27 NOTE — PATIENT INSTRUCTIONS
Thank you for coming to your visit today. As we discussed your sodium is stable, your sodium is 137 mEq/L. Please follow the recommendations below       Continue fluid restriction 1.5 to 1.8 L    Try to exercise at least 30 minutes 3 days a week to begin with with an ultimate goal of 5 days a week for at least 30 minutes  Continue Torsemide 20mg a day.  Take an extra 1/2 dose in the afternoon if weight gain or more fluid retention     Next Visit in 5-6 months with results   If you need to see us earlier we can change the appointment for you      Germán Jc MD  Nephrology Attending

## 2023-09-28 ENCOUNTER — TELEPHONE (OUTPATIENT)
Dept: CARDIOLOGY CLINIC | Facility: CLINIC | Age: 68
End: 2023-09-28

## 2023-09-28 RX ORDER — TORSEMIDE 10 MG/1
40 TABLET ORAL DAILY
Qty: 120 TABLET | Refills: 3 | Status: SHIPPED | OUTPATIENT
Start: 2023-09-28

## 2023-09-28 NOTE — TELEPHONE ENCOUNTER
Nancy Dukes ,pt is unsure what his dose of torsemide should be -He has run out -I spoke with his wife and she said someone changed it to Torsemide 20 mg tab-bid   Wife number  -she wasn't really sure either -Dr Joslyn Holbrook is asking what dose -is current . I don't want to send the wrong dose

## 2023-09-28 NOTE — TELEPHONE ENCOUNTER
Script sent by Dr. Selena Jones. Pt will pick thatup. Per nephrology and pt he takes 20 mg torsemide daily and prn 10 mg daily if rapid wt gain or symptoms of CHF.   Thank you

## 2023-10-06 ENCOUNTER — TELEMEDICINE (OUTPATIENT)
Dept: CARDIOLOGY CLINIC | Facility: CLINIC | Age: 68
End: 2023-10-06
Payer: MEDICARE

## 2023-10-06 VITALS
SYSTOLIC BLOOD PRESSURE: 114 MMHG | BODY MASS INDEX: 17.92 KG/M2 | OXYGEN SATURATION: 93 % | HEART RATE: 118 BPM | WEIGHT: 98 LBS | DIASTOLIC BLOOD PRESSURE: 44 MMHG

## 2023-10-06 DIAGNOSIS — I50.20 HFREF (HEART FAILURE WITH REDUCED EJECTION FRACTION) (HCC): Primary | ICD-10-CM

## 2023-10-06 DIAGNOSIS — J44.9 COPD, SEVERE (HCC): ICD-10-CM

## 2023-10-06 PROCEDURE — 99442 PR PHYS/QHP TELEPHONE EVALUATION 11-20 MIN: CPT | Performed by: PHYSICIAN ASSISTANT

## 2023-10-06 NOTE — PROGRESS NOTES
Virtual Brief Visit    This Visit is being completed by telephone. The Patient is located at Home and in the following state in which I hold an active license [place]    The patient was identified by name and date of birth. Tamiko Miguels. was informed that this is a telemedicine visit and that the visit is being conducted through telephone. He agrees to proceed. .  My office door was closed. No one else was in the room. He acknowledged consent and understanding of privacy and security of the video platform. The patient has agreed to participate and understands they can discontinue the visit at any time. Patient is aware this is a billable service. Assessment/Plan:    Chronic HFrEF; LVEF 20%; NYHA III; ACC/AHA Stage C              Etiology: Nonischemic cardiomyopathy.  Possible dyssynchrony from chronic LBBB.  Despite QRS only being 120 ms, echo shows significant dyssynchrony. Recent drop in EF likely related to stress myopathy with acute exacerbations of COPD.     Weighed 115 lb on 5/10, 105 lb on 5/16/23, 104 lb 5/18, 103 lbs, 96 lbs, 97 lbs. Today, weighs 98 lbs at home.     TTE 5/1/23: LVEF 20%. Severe global hypokinesis with regional variation. RV cavity size normal, systolic function normal. Trace MR, TR.   TTE 2/21/23: LVEF 30%. LVIDd 6.6cm. severe global hypokinesis. Grade I DD. RV cavity size and systolic function normal. Mild TR.   TTE 2/12/2023: LVEF 20-25%.  Severe global hypokinesis with regional variation.  Grade 1 DD.  Abnormal septal motion consistent with LBBB.  RV cavity mildly dilated, normal systolic function.  Mild MR, mild TR.  RVSP 38.  Dilated IVC.   TTE 8/26/2022: LVEF 40%.  RV cavity mildly dilated.  Systolic function normal.  Mild MR, TR.  Normal IVC.     Neurohormonal Blockade:  --Beta Blocker: no  --ARNi / ACEi / ARB: losartan 12.5 mg QD, off   --Aldosterone Antagonist: no   --SGLT2 Inhibitor: no  --Home Diuretic: torsemide 20 mg BID     Sudden Cardiac Death Risk Reduction:  --ICD: LVEF 20%. Not interested in invasive therapies at this time.     Electrical Resynchronization:  --Candidacy for BiV device: QRSd 120ms, chronic LBBB with dyssynchrony on echocardiogram.     Advanced Therapies (if appropriate): Not appropriate at this time, particularly considering advanced lung disease.        COPD (end stage)  Former smoker; 105 pack years, quit in 2012. Severe disease, follows closely with pulm.  On home oxygen. Multiple hospitalizations with acute COPD exacerbations   Management per pulm. COVID-19  Tested positive on 2/22/23.   Nonobstructive CAD  Has coronary calcium on CT  On aspirin and statin  Hyperlipidemia  Continue statin  LDL 75 8/2021  KEVIN   On BiPAP nightly  GOC. Following with palliative care.      HPI:   Glendy Robbins Jr. is a 74year-old male who presented to [facility] on 2/12/2023 with an acute COPD exacerbation requiring intubation. Extubated 2/15/23.  PMH includes nonischemic cardiomyopathy, chronic systolic and diastolic heart failure, nonobstructive CAD, hyperlipidemia, severe COPD, KEVIN.  Was previously admitted in 8/2022 for acute decompensated heart failure and COPD exacerbation.   LVEF 40% at that time, down from prior of 45 to 50%, though previously EF had been 35% for years.  Started on Lasix at that time. Maxime Marrerost been euvolemic, though blood pressures have made it difficult to start/titrate up on GDMT.  Recently saw Dr. Yajaira Son in the office in December, at which time his Lasix was shifted to as needed and adequate oral intake and hydration were encouraged.     He was reportedly short of breath for 2 days prior to presentation, with hallucinations.  His wife called 911 and EMS intubated him in the field. [facility] was found to have an elevated procalcitonin on admission and started on azithromycin and ceftriaxone.  He was given IV Lasix with good urine output.  He was weaned off the ventilator and extubated on 2/15.  Maintained on BiPAP overnight and has been receiving scheduled nebulizers with aggressive pulmonary hygiene. TTE on admission with LVEF 20 to 25%, down from 40% in 8/2022.      He is also followed by pulmonology, who recommended consideration for home hospice for severe Leesa Moya was seen by palliative care while inpatient here, and patient and family elected to make code status level 3 DNR/DNI.  Pulmonology has been following him as well and has been managing steroids, bronchodilators, and nebulizer therapy.     Per TH: 3/7/23. Presents for post hospital follow up with his wife. Just discharged from [facility]. Feeling at his baseline. Gets SOB with minimal exertion. O2 sats in the low 80's on 3-4 L NC,  on initial assessment today. Has complaints that his feet are red and swollen today. Wants to keep fighting. Dreading another hospital admission.      Diuretics increased [facility] (Lasix increased to BID x 3 days.)     Recently hospitalized from 4/30-5/10/23 for acute respiratory failure in the setting of COPD. He was admitted initially to the ICU and treated with IV steroids, scheduled nebulized breathing treatments, and increased supplemental oxygen.  He also required BiPAP.  Ultimately he was stabilized and transition to the floor.  Goals of care were discussed at length with the patient and his wife. Heidi Hernandez is currently a DNR/DNI. Plan per chart review per patient and his wife; plan with next exacerbation is to return to the hospital, but to transition to palliative care and comfort measures at that time. Referred to home hospice as of 5/12/23, wife expressed they would like to speak to palliative on Friday before making a final decision.      5/17/23: presents today for follow up. Feels at his baseline, O2 sats mid to high 90s on home 3L NC. Some LE swelling, improves with elevating his legs. Able to complete ADLs and move around the house, SOB not increased from his baseline. Meeting with palliative care tomorrow.  Denies chest pain, palpitations, dizziness, syncope. Feels like he's urinating much more with torsemide than he did with Lasix. Weighs himself daily, small fluctuations but overall stable.      6/1/23: presents today for follow up. In respiratory distress, confused, tachypneic. Wife reports this has been worsening since this morning; no recent trigger, fevers, chills. +LE swelling. Confirmed at today's appointment that patient is currently FULL CODE. EMS called, transported via ambulance to ED, transfer order placed.     6/26/23: presents today for follow up. Hospitalized at [facility] from 6/1 to 6/12 for respiratory distress, treated for COPD exacerbation with steroids and breathing treatments, eventually weaned off BiPAP. Treated with IV Lasix as well. Reaffirmed full CODE STATUS. Doing better today, feeling improved since hospitalization. Appetite has been down, supplementing with Ensure. Minimal lower extremity swelling, shortness of breath at baseline. Urinating well with torsemide. EMS was called on 6/13 by [facility], as patient was hypoxic and short of breath on their arrival.  EMS checked oxygen per wife, reports that was above 92%. Does report that they noticed elevated blood sugar, so she has been checking his sugars at home and noting numbers in the 200s. No further EMS calls, has been tolerating medications.      Hospitalized 7/19 through 7/21 and again 7/22 through 7/29 for COPD exacerbation.  Hospitalized again at [facility] from 7/31 to 8/3, again for hypoxic respiratory failure in the setting of his home BiPAP not working. Home oxygen at 6L NC at rest and 8L with exertion, along with nightly BiPAP. Has appropriate supplies at home.      8/14/23: presents today for follow up. Breathing at baseline. On 3L home O2 at rest, 6L with exertion. Some swelling in feet and ankles. Urinating well with current dose of torsemide. Drinking Ensure, trying to gain caloric weight. Weight between 92-96 lbs at home.  Would like to explore virtual visits when possible going forward.      9/6/23: Presents today for follow up. Being treated for pneumonia by pulm, occasional chills. Taking antibiotics. Increased his torsemide to 40 mg BID x 2 days with increased urination. Has +LE swelling, increased cough from baseline. Unable to fully cough up sputum. No fever. On consistent home O2.     10/6/23: Presents for virtual visit today. Feeling at his baseline. Feels like his appetite is adequate but gets fatigued easily when eating. Some LE swelling, at baseline. Taking torsemide 20 mg  BID, urinating well with this. Breathing at baseline, coughing less since finishing antibiotic course. Feels like he needs to drink more water, reviewed hydration importance with him. Seeing palliative care and pulm next week. No cardiac complaints today. Problem List Items Addressed This Visit    None      Recent Visits  No visits were found meeting these conditions. Showing recent visits within past 7 days and meeting all other requirements  Today's Visits  Date Type Provider Dept   10/06/23 Telemedicine [facility] SILVERIO Soriano today's visits and meeting all other requirements  Future Appointments  No visits were found meeting these conditions. Showing future appointments within next 150 days and meeting all other requirements     Plan:   Continue torsemide 20 mg BID  2g sodium restriction, 2L fluid restriction, daily weights. Follow up as scheduled with Dr. Leyla Parker.     Visit Time  Total Visit Duration: 20 minutes

## 2023-10-06 NOTE — PATIENT INSTRUCTIONS
Continue current medications. Please weigh yourself every day (after emptying your bladder) and keep a detailed log of weights. Contact the Heart Failure program at [phone] if you gain 3+ lbs overnight or 5+ lbs in 5-7 days. Limit daily sodium/salt intake to 2000 mg daily to prevent fluid retention. Avoid canned foods, fast food/Chinese food, and processed meats (hot dogs, lunch meat, and sausage etc.). Caution with condiments. Limit fluid intake to 2000 mL or 2 liters (about 60-65 ounces) daily. Avoid electrolyte replacement drinks (such as Gatorade, Pedialyte, Propel, Liquid IV, etc.). Bring complete list of medications and log of daily weights to your follow-up appointment.

## 2023-10-11 ENCOUNTER — APPOINTMENT (EMERGENCY)
Dept: RADIOLOGY | Facility: HOSPITAL | Age: 68
End: 2023-10-11
Payer: COMMERCIAL

## 2023-10-11 ENCOUNTER — HOSPITAL ENCOUNTER (INPATIENT)
Facility: HOSPITAL | Age: 68
LOS: 3 days | Discharge: HOME | End: 2023-10-14
Attending: STUDENT IN AN ORGANIZED HEALTH CARE EDUCATION/TRAINING PROGRAM | Admitting: INTERNAL MEDICINE
Payer: COMMERCIAL

## 2023-10-11 ENCOUNTER — OFFICE VISIT (OUTPATIENT)
Dept: PULMONOLOGY | Facility: CLINIC | Age: 68
End: 2023-10-11
Payer: MEDICARE

## 2023-10-11 VITALS
SYSTOLIC BLOOD PRESSURE: 102 MMHG | RESPIRATION RATE: 18 BRPM | HEIGHT: 62 IN | WEIGHT: 98 LBS | BODY MASS INDEX: 18.03 KG/M2 | DIASTOLIC BLOOD PRESSURE: 60 MMHG | HEART RATE: 95 BPM | OXYGEN SATURATION: 94 % | TEMPERATURE: 96.4 F

## 2023-10-11 DIAGNOSIS — J43.9 PULMONARY EMPHYSEMA, UNSPECIFIED EMPHYSEMA TYPE (HCC): Primary | ICD-10-CM

## 2023-10-11 DIAGNOSIS — J44.9 PULMONARY CACHEXIA DUE TO COPD (HCC): ICD-10-CM

## 2023-10-11 DIAGNOSIS — R91.8 PULMONARY NODULES/LESIONS, MULTIPLE: ICD-10-CM

## 2023-10-11 DIAGNOSIS — J96.02 ACUTE RESPIRATORY FAILURE WITH HYPOXIA AND HYPERCAPNIA (HCC): ICD-10-CM

## 2023-10-11 DIAGNOSIS — E43 SEVERE PROTEIN-CALORIE MALNUTRITION (HCC): ICD-10-CM

## 2023-10-11 DIAGNOSIS — J96.01 ACUTE RESPIRATORY FAILURE WITH HYPOXIA AND HYPERCAPNIA (HCC): ICD-10-CM

## 2023-10-11 DIAGNOSIS — J96.12 CHRONIC RESPIRATORY FAILURE WITH HYPOXIA AND HYPERCAPNIA (HCC): ICD-10-CM

## 2023-10-11 DIAGNOSIS — E87.1 CHRONIC HYPONATREMIA: ICD-10-CM

## 2023-10-11 DIAGNOSIS — J44.1 COPD EXACERBATION (HCC): Primary | ICD-10-CM

## 2023-10-11 DIAGNOSIS — J96.92 HYPERCAPNIC RESPIRATORY FAILURE (HCC): ICD-10-CM

## 2023-10-11 DIAGNOSIS — J96.00 ACUTE RESPIRATORY FAILURE, UNSPECIFIED WHETHER WITH HYPOXIA OR HYPERCAPNIA (HCC): ICD-10-CM

## 2023-10-11 DIAGNOSIS — R64 PULMONARY CACHEXIA DUE TO COPD (HCC): ICD-10-CM

## 2023-10-11 DIAGNOSIS — J96.11 CHRONIC RESPIRATORY FAILURE WITH HYPOXIA AND HYPERCAPNIA (HCC): ICD-10-CM

## 2023-10-11 DIAGNOSIS — J44.1 CHRONIC OBSTRUCTIVE PULMONARY DISEASE WITH ACUTE EXACERBATION (HCC): ICD-10-CM

## 2023-10-11 LAB
ALBUMIN SERPL BCP-MCNC: 4.3 G/DL (ref 3.5–5)
ALP SERPL-CCNC: 62 U/L (ref 34–104)
ALT SERPL W P-5'-P-CCNC: 12 U/L (ref 7–52)
AST SERPL W P-5'-P-CCNC: 31 U/L (ref 13–39)
BASE EX.OXY STD BLDV CALC-SCNC: 45.2 % (ref 60–80)
BASE EX.OXY STD BLDV CALC-SCNC: 80.4 % (ref 60–80)
BASE EXCESS BLDV CALC-SCNC: 19.2 MMOL/L
BASE EXCESS BLDV CALC-SCNC: 22.1 MMOL/L
BASOPHILS # BLD AUTO: 0.03 THOUSANDS/ÂΜL (ref 0–0.1)
BASOPHILS NFR BLD AUTO: 1 % (ref 0–1)
BILIRUB SERPL-MCNC: 0.62 MG/DL (ref 0.2–1)
BNP SERPL-MCNC: 666 PG/ML (ref 0–100)
BUN SERPL-MCNC: 9 MG/DL (ref 5–25)
CALCIUM SERPL-MCNC: 9.6 MG/DL (ref 8.4–10.2)
CHLORIDE SERPL-SCNC: 74 MMOL/L (ref 96–108)
CO2 SERPL-SCNC: >45 MMOL/L (ref 21–32)
CREAT SERPL-MCNC: 0.62 MG/DL (ref 0.6–1.3)
EOSINOPHIL # BLD AUTO: 0.01 THOUSAND/ÂΜL (ref 0–0.61)
EOSINOPHIL NFR BLD AUTO: 0 % (ref 0–6)
ERYTHROCYTE [DISTWIDTH] IN BLOOD BY AUTOMATED COUNT: 12.2 % (ref 11.6–15.1)
GFR SERPL CREATININE-BSD FRML MDRD: 103 ML/MIN/1.73SQ M
GLUCOSE SERPL-MCNC: 256 MG/DL (ref 65–140)
GLUCOSE SERPL-MCNC: 91 MG/DL (ref 65–140)
HCO3 BLDV-SCNC: 50.9 MMOL/L (ref 24–30)
HCO3 BLDV-SCNC: 52.5 MMOL/L (ref 24–30)
HCT VFR BLD AUTO: 36.8 % (ref 36.5–49.3)
HGB BLD-MCNC: 11.4 G/DL (ref 12–17)
IMM GRANULOCYTES # BLD AUTO: 0.01 THOUSAND/UL (ref 0–0.2)
IMM GRANULOCYTES NFR BLD AUTO: 0 % (ref 0–2)
LYMPHOCYTES # BLD AUTO: 0.79 THOUSANDS/ÂΜL (ref 0.6–4.47)
LYMPHOCYTES NFR BLD AUTO: 12 % (ref 14–44)
MCH RBC QN AUTO: 30.3 PG (ref 26.8–34.3)
MCHC RBC AUTO-ENTMCNC: 31 G/DL (ref 31.4–37.4)
MCV RBC AUTO: 98 FL (ref 82–98)
MONOCYTES # BLD AUTO: 0.92 THOUSAND/ÂΜL (ref 0.17–1.22)
MONOCYTES NFR BLD AUTO: 14 % (ref 4–12)
NEUTROPHILS # BLD AUTO: 4.64 THOUSANDS/ÂΜL (ref 1.85–7.62)
NEUTS SEG NFR BLD AUTO: 73 % (ref 43–75)
NRBC BLD AUTO-RTO: 0 /100 WBCS
O2 CT BLDV-SCNC: 13.2 ML/DL
O2 CT BLDV-SCNC: 8.3 ML/DL
PCO2 BLDV: 106 MM HG (ref 42–50)
PCO2 BLDV: 97.7 MM HG (ref 42–50)
PH BLDV: 7.3 [PH] (ref 7.3–7.4)
PH BLDV: 7.35 [PH] (ref 7.3–7.4)
PLATELET # BLD AUTO: 253 THOUSANDS/UL (ref 149–390)
PMV BLD AUTO: 9.3 FL (ref 8.9–12.7)
PO2 BLDV: 27.6 MM HG (ref 35–45)
PO2 BLDV: 52.2 MM HG (ref 35–45)
POTASSIUM SERPL-SCNC: 3.2 MMOL/L (ref 3.5–5.3)
PROT SERPL-MCNC: 7.1 G/DL (ref 6.4–8.4)
RBC # BLD AUTO: 3.76 MILLION/UL (ref 3.88–5.62)
SODIUM SERPL-SCNC: 129 MMOL/L (ref 135–147)
WBC # BLD AUTO: 6.4 THOUSAND/UL (ref 4.31–10.16)

## 2023-10-11 PROCEDURE — 99223 1ST HOSP IP/OBS HIGH 75: CPT | Performed by: INTERNAL MEDICINE

## 2023-10-11 PROCEDURE — 94760 N-INVAS EAR/PLS OXIMETRY 1: CPT

## 2023-10-11 PROCEDURE — 99285 EMERGENCY DEPT VISIT HI MDM: CPT | Performed by: STUDENT IN AN ORGANIZED HEALTH CARE EDUCATION/TRAINING PROGRAM

## 2023-10-11 PROCEDURE — NC001 PR NO CHARGE

## 2023-10-11 PROCEDURE — 94640 AIRWAY INHALATION TREATMENT: CPT

## 2023-10-11 PROCEDURE — 93005 ELECTROCARDIOGRAM TRACING: CPT

## 2023-10-11 PROCEDURE — 96374 THER/PROPH/DIAG INJ IV PUSH: CPT

## 2023-10-11 PROCEDURE — 71045 X-RAY EXAM CHEST 1 VIEW: CPT

## 2023-10-11 PROCEDURE — 99285 EMERGENCY DEPT VISIT HI MDM: CPT

## 2023-10-11 PROCEDURE — 83880 ASSAY OF NATRIURETIC PEPTIDE: CPT

## 2023-10-11 PROCEDURE — 85025 COMPLETE CBC W/AUTO DIFF WBC: CPT

## 2023-10-11 PROCEDURE — 82948 REAGENT STRIP/BLOOD GLUCOSE: CPT

## 2023-10-11 PROCEDURE — 99215 OFFICE O/P EST HI 40 MIN: CPT | Performed by: INTERNAL MEDICINE

## 2023-10-11 PROCEDURE — 36415 COLL VENOUS BLD VENIPUNCTURE: CPT | Performed by: STUDENT IN AN ORGANIZED HEALTH CARE EDUCATION/TRAINING PROGRAM

## 2023-10-11 PROCEDURE — 80053 COMPREHEN METABOLIC PANEL: CPT

## 2023-10-11 PROCEDURE — 82805 BLOOD GASES W/O2 SATURATION: CPT | Performed by: STUDENT IN AN ORGANIZED HEALTH CARE EDUCATION/TRAINING PROGRAM

## 2023-10-11 RX ORDER — PRAVASTATIN SODIUM 80 MG/1
80 TABLET ORAL
Status: DISCONTINUED | OUTPATIENT
Start: 2023-10-11 | End: 2023-10-14 | Stop reason: HOSPADM

## 2023-10-11 RX ORDER — BUDESONIDE 0.5 MG/2ML
1 INHALANT ORAL
Status: DISCONTINUED | OUTPATIENT
Start: 2023-10-12 | End: 2023-10-14 | Stop reason: HOSPADM

## 2023-10-11 RX ORDER — ENOXAPARIN SODIUM 100 MG/ML
40 INJECTION SUBCUTANEOUS DAILY
Status: DISCONTINUED | OUTPATIENT
Start: 2023-10-12 | End: 2023-10-11

## 2023-10-11 RX ORDER — METHYLPREDNISOLONE SODIUM SUCCINATE 40 MG/ML
40 INJECTION, POWDER, LYOPHILIZED, FOR SOLUTION INTRAMUSCULAR; INTRAVENOUS EVERY 8 HOURS
Status: DISCONTINUED | OUTPATIENT
Start: 2023-10-11 | End: 2023-10-14

## 2023-10-11 RX ORDER — IPRATROPIUM BROMIDE AND ALBUTEROL SULFATE 2.5; .5 MG/3ML; MG/3ML
3 SOLUTION RESPIRATORY (INHALATION)
Status: DISCONTINUED | OUTPATIENT
Start: 2023-10-11 | End: 2023-10-11

## 2023-10-11 RX ORDER — IPRATROPIUM BROMIDE AND ALBUTEROL SULFATE 2.5; .5 MG/3ML; MG/3ML
3 SOLUTION RESPIRATORY (INHALATION) EVERY 20 MIN
Status: DISCONTINUED | OUTPATIENT
Start: 2023-10-11 | End: 2023-10-11

## 2023-10-11 RX ORDER — INSULIN LISPRO 100 [IU]/ML
1-5 INJECTION, SOLUTION INTRAVENOUS; SUBCUTANEOUS
Status: DISCONTINUED | OUTPATIENT
Start: 2023-10-11 | End: 2023-10-14 | Stop reason: HOSPADM

## 2023-10-11 RX ORDER — LANOLIN ALCOHOL/MO/W.PET/CERES
3 CREAM (GRAM) TOPICAL
Status: DISCONTINUED | OUTPATIENT
Start: 2023-10-11 | End: 2023-10-14 | Stop reason: HOSPADM

## 2023-10-11 RX ORDER — HEPARIN SODIUM 5000 [USP'U]/ML
5000 INJECTION, SOLUTION INTRAVENOUS; SUBCUTANEOUS EVERY 8 HOURS SCHEDULED
Status: DISCONTINUED | OUTPATIENT
Start: 2023-10-11 | End: 2023-10-14 | Stop reason: HOSPADM

## 2023-10-11 RX ORDER — BUDESONIDE 0.5 MG/2ML
1 INHALANT ORAL
Status: DISCONTINUED | OUTPATIENT
Start: 2023-10-11 | End: 2023-10-11

## 2023-10-11 RX ORDER — METHYLPREDNISOLONE SODIUM SUCCINATE 125 MG/2ML
80 INJECTION, POWDER, LYOPHILIZED, FOR SOLUTION INTRAMUSCULAR; INTRAVENOUS ONCE
Status: COMPLETED | OUTPATIENT
Start: 2023-10-11 | End: 2023-10-11

## 2023-10-11 RX ORDER — LEVALBUTEROL INHALATION SOLUTION 1.25 MG/3ML
1.25 SOLUTION RESPIRATORY (INHALATION)
Status: DISCONTINUED | OUTPATIENT
Start: 2023-10-11 | End: 2023-10-14 | Stop reason: HOSPADM

## 2023-10-11 RX ORDER — SODIUM CHLORIDE FOR INHALATION 0.9 %
3 VIAL, NEBULIZER (ML) INHALATION
Status: DISCONTINUED | OUTPATIENT
Start: 2023-10-11 | End: 2023-10-12 | Stop reason: CLARIF

## 2023-10-11 RX ORDER — CHOLECALCIFEROL (VITAMIN D3) 125 MCG
1 CAPSULE ORAL
Status: DISCONTINUED | OUTPATIENT
Start: 2023-10-11 | End: 2023-10-11

## 2023-10-11 RX ORDER — TORSEMIDE 20 MG/1
40 TABLET ORAL DAILY
Status: DISCONTINUED | OUTPATIENT
Start: 2023-10-12 | End: 2023-10-14 | Stop reason: HOSPADM

## 2023-10-11 RX ORDER — FORMOTEROL FUMARATE 20 UG/2ML
20 SOLUTION RESPIRATORY (INHALATION)
Status: DISCONTINUED | OUTPATIENT
Start: 2023-10-11 | End: 2023-10-14 | Stop reason: HOSPADM

## 2023-10-11 RX ORDER — POTASSIUM CHLORIDE 20 MEQ/1
40 TABLET, EXTENDED RELEASE ORAL ONCE
Status: COMPLETED | OUTPATIENT
Start: 2023-10-11 | End: 2023-10-11

## 2023-10-11 RX ADMIN — POTASSIUM CHLORIDE 40 MEQ: 1500 TABLET, EXTENDED RELEASE ORAL at 20:32

## 2023-10-11 RX ADMIN — INSULIN LISPRO 2 UNITS: 100 INJECTION, SOLUTION INTRAVENOUS; SUBCUTANEOUS at 21:25

## 2023-10-11 RX ADMIN — METHYLPREDNISOLONE SODIUM SUCCINATE 40 MG: 40 INJECTION, POWDER, FOR SOLUTION INTRAMUSCULAR; INTRAVENOUS at 20:32

## 2023-10-11 RX ADMIN — Medication 3 MG: at 21:17

## 2023-10-11 RX ADMIN — IPRATROPIUM BROMIDE 0.5 MG: 0.5 SOLUTION RESPIRATORY (INHALATION) at 20:50

## 2023-10-11 RX ADMIN — ISODIUM CHLORIDE 3 ML: 0.03 SOLUTION RESPIRATORY (INHALATION) at 20:50

## 2023-10-11 RX ADMIN — FORMOTEROL FUMARATE DIHYDRATE 20 MCG: 20 SOLUTION RESPIRATORY (INHALATION) at 20:50

## 2023-10-11 RX ADMIN — IPRATROPIUM BROMIDE AND ALBUTEROL SULFATE 3 ML: 2.5; .5 SOLUTION RESPIRATORY (INHALATION) at 11:37

## 2023-10-11 RX ADMIN — LEVALBUTEROL HYDROCHLORIDE 1.25 MG: 1.25 SOLUTION RESPIRATORY (INHALATION) at 20:50

## 2023-10-11 RX ADMIN — METHYLPREDNISOLONE SODIUM SUCCINATE 80 MG: 125 INJECTION, POWDER, FOR SOLUTION INTRAMUSCULAR; INTRAVENOUS at 10:10

## 2023-10-11 RX ADMIN — PRAVASTATIN SODIUM 80 MG: 80 TABLET ORAL at 20:32

## 2023-10-11 RX ADMIN — HEPARIN SODIUM 5000 UNITS: 5000 INJECTION INTRAVENOUS; SUBCUTANEOUS at 21:17

## 2023-10-11 RX ADMIN — IPRATROPIUM BROMIDE AND ALBUTEROL SULFATE 3 ML: 2.5; .5 SOLUTION RESPIRATORY (INHALATION) at 10:16

## 2023-10-11 RX ADMIN — IPRATROPIUM BROMIDE AND ALBUTEROL SULFATE 3 ML: 2.5; .5 SOLUTION RESPIRATORY (INHALATION) at 12:09

## 2023-10-11 RX ADMIN — IPRATROPIUM BROMIDE AND ALBUTEROL SULFATE 3 ML: 2.5; .5 SOLUTION RESPIRATORY (INHALATION) at 13:28

## 2023-10-11 NOTE — ASSESSMENT & PLAN NOTE
Wt Readings from Last 3 Encounters:   10/11/23 44.5 kg (98 lb)   10/11/23 44.5 kg (98 lb)   10/06/23 44.5 kg (98 lb)     Lab Results   Component Value Date    LVEF 20 05/01/2023     (H) 10/11/2023     (H) 07/22/2023       Presents with increased shortness of breath  NYHA Class III., Stage C  Patient appears euvolemic.     Plan:  GDMT: Diuretic: Torsemide 40 mg daily  CMP, magnesium tomorrow a.m; Goal Mg > 2 and K > 4; Replete prn  HOB > 30°, Daily standing weights, Measure I/O  Diet: Regular with fluid restriction of 1500 mL

## 2023-10-11 NOTE — PROGRESS NOTES
Progress Note - Triage Asssessment   Jessi Johnson. 79 y.o. male MRN: [id number]    Time Called ( Time): 0765  Date Called: 10/11/23  Room#: ED 43  Person requesting evaluation: Dr. Wilder Nichols    Situation:    80 yo M who presents from pulm office w/ SOB and wheezing requiring BiPAP, nebs and IV solumedrol in ED. PMHx includes very severe COPD on 3L NC at baseline and trilogy qHS, HFrEF w/ EF of 20%. Exam:  GENERAL APPEARANCE: Cachectic appearing gentleman  HEENT: NCAT; Mucous membranes moist  CV: Regular rate and rhythm; no murmur/gallops/rubs appreciated. CHEST / LUNGS: no wheezes, scattered rhonchi in b/l lung fields. ABD: soft; non-distended; non-tender. EXT: 3+ b/l LE edema   NEURO: GCS 15; no focal neurologic deficits; neurovascularly intact. SKIN: Warm, dry and well perfused; no rash; no jaundice. Interventions:   N/A         Triage Assessment:     Patient can be admitted to med-surg level of care    Recs:   Would benefit from pulm consult on floor and needs to continue on home PAP therapy  Continue home diuretics  and consider additional IV diuresis as it is unclear if there is contributing volume overload give LE edema and hx of HFrEF   Continue nebs and steroids for COPD exacerbation   Continue home nebs and copd controller medications        Recommendations discussed with ED attending Dr. Wynne Primrose and SLIM attending Dr. Wilder Nichols.

## 2023-10-11 NOTE — ASSESSMENT & PLAN NOTE
Wt Readings from Last 3 Encounters:   10/11/23 44.5 kg (98 lb)   10/11/23 44.5 kg (98 lb)   10/06/23 44.5 kg (98 lb)     Lab Results   Component Value Date    LVEF 20 05/01/2023     (H) 10/11/2023     (H) 07/22/2023       Presents with increased shortness of breath  NYHA Class III., Stage C  Patient appears euvolemic. Bed scale weight 102 today  Last Echo:  Left Ventricle: Left ventricular cavity size is mildly dilated. Wall thickness is normal. The left ventricular ejection fraction is 20%. Systolic function is severely reduced. There is severe global hypokinesis with regional variation.      Plan:  GDMT: Diuretic: Torsemide 40 mg daily  CMP, magnesium tomorrow a.m; Goal Mg > 2 and K > 4; Replete prn  HOB > 30°, Daily standing weights, Measure I/O  Diet: Regular with fluid restriction of 1200 mL  Telemetry

## 2023-10-11 NOTE — ASSESSMENT & PLAN NOTE
In the office today he continues to fall asleep and have jerking motions. I am concerned that he has worsening of his underlying hypercapnia despite using trilogy vent at night. Plan to send to ED for evaluation and possible admission.

## 2023-10-11 NOTE — ASSESSMENT & PLAN NOTE
Patient came in with shortness of breath. Was lethargic since past 2 days. Referred to ED by his pulmonologist.  In ED VBG showed acidosis with hypoxia and hypercapnia  Patient use trilogy vent at night, in daytime use 3 L O2 at rest and 4 L O2 on exertion  Last PFT(2020): FEV1/FVC-30%, spirometry showed airflow obstruction  CT chest(7/31/23): Moderate patchy bilateral groundglass opacity and consolidation, new from December 2022, infectious/inflammatory. New 6 mm right upper lobe nodule. Per 2017 Fleischner Society guidelines, recommend follow-up with a chest CT in 6 months. Moderate emphysema.   Etiology: COPD exacerbation vs component of heart failure    Plan:  Monitor SPO2  BiPAP at night  Continue home O2 at daytime  Respiratory protocol  Consider VBG a.m. if required

## 2023-10-11 NOTE — ED NOTES
Call wife pavithra and let her know what room patient is going to [address] [facility]  10/11/23 1400

## 2023-10-11 NOTE — ASSESSMENT & PLAN NOTE
Lab Results   Component Value Date    SARSCOV2 Negative 07/19/2023    INFLUA Negative 07/19/2023    INFLUB Negative 07/19/2023    RSV Negative 07/19/2023   Smoking Status: Former  Patient use Pulmicort/Perforomist twice daily nebulization at home and Proventil as needed    Plan:  Xopenex/Atrovent 3 times daily  IV Solu-Medrol: 40 mg every 8h; taper as appropriate  3-4 L NC, Goal O2 sat 88-92%  CBC, BMP  Monitor respiratory status, Respiratory protocol, Airway clearance protocol, IS

## 2023-10-11 NOTE — ASSESSMENT & PLAN NOTE
Lab Results   Component Value Date    HGBA1C 6.5 (H) 08/30/2023       No results for input(s): "POCGLU" in the last 72 hours. Blood Sugar Average: Last 72 hrs:    Patient A1c elevated to 6.5 from 5.7 secondary to steroid  Since his last admission patient was discharged on Humalog but is not compliant with it.   Hold humolog   Monitor BG daily  Sliding scale insulin  Hypoglycemia protocol

## 2023-10-11 NOTE — H&P
[id number]  H&P  Name: [place]. 79 y.o. male I MRN: [id number]  Unit/Bed#: ED-42 I Date of Admission: 10/11/2023   Date of Service: 10/11/2023 I Hospital Day: 0      Assessment/Plan   * Acute respiratory failure with hypoxia and hypercapnia [facility])  Assessment & Plan  Patient came in with shortness of breath. Was lethargic since past 2 days. Referred to ED by his pulmonologist.  In ED VBG showed acidosis with hypoxia and hypercapnia  Patient use trilogy vent at night, in daytime use 3 L O2 at rest and 4 L O2 on exertion  Last PFT(2020): FEV1/FVC-30%, spirometry showed airflow obstruction  CT chest(7/31/23): Moderate patchy bilateral groundglass opacity and consolidation, new from December 2022, infectious/inflammatory. New 6 mm right upper lobe nodule. Per 2017 Fleischner Society guidelines, recommend follow-up with a chest CT in 6 months. Moderate emphysema.   Etiology: COPD exacerbation vs component of heart failure    Plan:  Monitor SPO2  BiPAP at night  Continue home O2 at daytime  Respiratory protocol  Consider VBG a.m. if required    COPD exacerbation  Assessment & Plan  Lab Results   Component Value Date    SARSCOV2 Negative 07/19/2023    INFLUA Negative 07/19/2023    INFLUB Negative 07/19/2023    RSV Negative 07/19/2023   Smoking Status: Former  Patient use Pulmicort/Perforomist twice daily nebulization at home and Proventil as needed    Plan:  Xopenex/Atrovent 3 times daily  IV Solu-Medrol: 40 mg every 8h; taper as appropriate  3-4 L NC, Goal O2 sat 88-92%  CBC, BMP  Monitor respiratory status, Respiratory protocol, Airway clearance protocol, IS    Chronic HFrEF (heart failure with reduced ejection fraction)   Assessment & Plan  Wt Readings from Last 3 Encounters:   10/11/23 44.5 kg (98 lb)   10/11/23 44.5 kg (98 lb)   10/06/23 44.5 kg (98 lb)     Lab Results   Component Value Date    LVEF 20 05/01/2023     (H) 10/11/2023     (H) 07/22/2023       Presents with increased shortness of breath  NYHA Class III., Stage C  Patient appears euvolemic. Plan:  GDMT: Diuretic: Torsemide 40 mg daily  CMP, magnesium tomorrow a.m; Goal Mg > 2 and K > 4; Replete prn  HOB > 30°, Daily standing weights, Measure I/O  Diet: Regular with fluid restriction of 1500 mL            Chronic hyponatremia  Assessment & Plan  Lab Results   Component Value Date    SODIUM 129 (L) 10/11/2023    SODIUM 136 08/30/2023   Patient has a chronic hyponatremia. Baseline 125-135  Monitor BMP daily  Fluid restriction of 1500 mL    Prediabetes  Assessment & Plan  Lab Results   Component Value Date    HGBA1C 6.5 (H) 08/30/2023       No results for input(s): "POCGLU" in the last 72 hours. Blood Sugar Average: Last 72 hrs:    Patient A1c elevated to 6.5 from 5.7 secondary to steroid  Since his last admission patient was discharged on Humalog but is not compliant with it. Hold humolog   Monitor BG daily  Sliding scale insulin  Hypoglycemia protocol               VTE Pharmacologic Prophylaxis: VTE Score: 3 Moderate Risk (Score 3-4) - Pharmacological DVT Prophylaxis Ordered: enoxaparin (Lovenox). Code Status: Level 1 - Full Code   Discussion with family: Updated  (wife) via phone. Anticipated Length of Stay: Patient will be admitted on an inpatient basis with an anticipated length of stay of greater than 2 midnights secondary to acute respiratory failure with hypoxia and hypercapnia. Chief Complaint: Shortness of breath    History of Present Illness:  Sixto Michel. is a 79 y.o. male with a PMH of COPD, HFrEF, HLD, prediabetes, prostate cancer s/p TURP(2021) who presents with shortness of breath. As per wife patient was relatively more tired since past 2 days, they went for appointment with pulmonologist today and in the office pulmonologist assessed him and recommended him to go to ED if he requires admission or not.   His wife mentioned that lately he has been sitting more on the chair and sleeps on table like mat, patient uses trilogy vent at night but recently especially in the morning his SPO2 drops down to 88-89 and after using nebulizers it improves to 95-97, in the daytime he uses 3 L O2 at rest/4 L O2 on exertion, she also mentioned his appetite decreased over the past couple of months and patient also agreed on that he is not able to eat full meal.  His leg swelling got little worse which could be secondary to his noncompliance with leg elevation, other than that he has been compliant with all his medications except insulin. In ED, he was put on BiPAP considering his respiratory failure with hypoxia and hypercapnia which progressively improved and he is off BiPAP now received multiple nebulizers which improved his breathing. Review of Systems:  Review of Systems   Constitutional:  Positive for appetite change (Decrease). Negative for chills and fever. HENT:  Negative for ear pain and sore throat. Eyes:  Negative for pain and visual disturbance. Respiratory:  Positive for shortness of breath. Negative for cough. Cardiovascular:  Negative for chest pain and palpitations. Gastrointestinal:  Negative for abdominal pain and vomiting. Genitourinary:  Negative for dysuria and hematuria. Musculoskeletal:  Negative for arthralgias and back pain. Skin:  Negative for color change and rash. Neurological:  Negative for seizures and syncope. All other systems reviewed and are negative.       Past Medical and Surgical History:   Past Medical History:   Diagnosis Date    Acute metabolic encephalopathy 88/78/4319    Arthritis     Bladder mass     Cardiomyopathy (HCC)     Chest pain     COPD (chronic obstructive pulmonary disease) ([address])     COVID-19 virus infection 02/23/2023    CPAP (continuous positive airway pressure) dependence     Emphysema of lung (HCC)     Hypoxia     nocturnal    Left bundle branch block     Multiple pulmonary nodules     last assessed: 10/12/16 Pneumonia     Sleep apnea, obstructive     Smoker     Weight loss 08/29/2019       Past Surgical History:   Procedure Laterality Date    COLONOSCOPY      CYSTOSCOPY      CYSTOSCOPY  02/17/2021    MD BLADDER INSTILLATION ANTICARCINOGENIC AGENT N/A 1/3/2020    Procedure: INSTILLATION MITOMYCIN;  Surgeon: Gucci Ayers MD;  Location: [facility];  Service: Urology    MD CYSTO W/REMOVAL OF LESIONS SMALL N/A 1/3/2020    Procedure: TRANSURETHRAL RESECTION OF BLADDER TUMOR (TURBT); Surgeon: Gucci Ayers MD;  Location[facility];  Service: Urology    MD CYSTOURETHROSCOPY WITH BIOPSY N/A 4/13/2021    Procedure: Bony Vale;  Surgeon: Gucci Ayers MD;  [facility];  Service: Urology    MD TRURL ELECTROSURG [place] COMPLETE N/A 4/13/2021    Procedure: TRANSURETHRAL RESECTION OF PROSTATE (TURP) BLADDER BIOPSY, FULGURATION;  Surgeon: Gucci Ayers MD;  Location: [facility];  Service: Urology       Meds/Allergies:  Prior to Admission medications    Medication Sig Start Date End Date Taking? Authorizing Provider   albuterol (PROVENTIL HFA,VENTOLIN HFA) 90 mcg/act inhaler Inhale 2 puffs every 4 (four) hours as needed for wheezing or shortness of breath 8/4/23  Yes DELIA Rojas   budesonide (Pulmicort) 1 MG/2ML nebulizer solution Take 2 mL (1 mg total) by nebulization daily Rinse mouth after use.  9/19/23  Yes DELIA Harris   cyanocobalamin (VITAMIN B-12) 1000 MCG tablet Take 1 tablet (1,000 mcg total) by mouth daily 5/10/23  Yes Chalmers Fabry Starsinic, DO   Diclofenac Sodium (VOLTAREN) 1 % APPLY 2 GRAMS 4 TIMES A DAY 3/13/23  Yes Historical Provider, MD   ergocalciferol (VITAMIN D2) 50,000 units  9/6/23  Yes Historical Provider, MD   formoterol (PERFOROMIST) 20 MCG/2ML nebulizer solution TAKE 2 ML (20 MCG TOTAL) BY NEBULIZATION 2 (TWO) TIMES A DAY 9/14/23  Yes DELIA Harris   ipratropium-albuterol (DUO-NEB) 0.5-2.5 mg/3 mL nebulizer solution Take 3 mL by nebulization 4 (four) times a day 9/19/23  Yes DELIA Whitehead   Melatonin 5 MG TABS Take 1 tablet by mouth daily at bedtime 3/13/23  Yes Historical Provider, MD   predniSONE 5 mg tablet Take 1 tablet (5 mg total) by mouth daily Resume prednisone 5mg daily when taper is completed Do not start before August 8, 2023. 8/8/23  Yes DELIA Duke   rosuvastatin (CRESTOR) 10 MG tablet Take 1 tablet (10 mg total) by mouth daily 3/27/23  Yes DELIA Bowens   sodium chloride (OCEAN) 0.65 % nasal spray 1 spray into each nostril every hour as needed for congestion 6/12/23  Yes DELIA Whitten   torsemide (DEMADEX) 10 mg tablet Take 4 tablets (40 mg total) by mouth daily 9/28/23  Yes Delvin Gonzáles MD   Insulin Pen Needle (BD Pen Needle Cathie 2nd Gen) 32G X 4 MM MISC For use with insulin pen. Pharmacy may dispense brand covered by insurance. Patient not taking: Reported on 10/11/2023 8/3/23   DELIA Duke   insuln lispro (HumaLOG KwikPen) 200 units/mL CONCENTRATED U-200 injection pen Inject 3 Units under the skin 3 (three) times a day with meals  Patient not taking: Reported on 10/11/2023 8/3/23   DELIA Duke   NovoLOG FlexPen 100 units/mL injection pen INJECT 3 UNITS UDNER THE SKIN 3 TIMES A DAY WITH MEALS  Patient not taking: Reported on 9/19/2023 8/3/23   Historical Provider, MD   OneTouch Delica Lancets F MISC May substitute brand based on insurance coverage. Check glucose TID.   Patient not taking: Reported on 10/11/2023 8/3/23   DELIA Duke   tamsulosin [facility]) 0.4 mg Take 1 capsule (0.4 mg total) by mouth daily with dinner  Patient not taking: Reported on 9/19/2023 2/28/23   Stephany Spencer,    budesonide-formoterol (Symbicort) 160-4.5 mcg/act inhaler Inhale 2 puffs 2 (two) times a day Rinse mouth after use. 8/29/22 8/29/22  Veronica Ramos,    mometasone-formoterol [facility] 200-5 MCG/ACT inhaler Inhale 2 puffs 2 (two) times a day Rinse mouth after use. 22  Thea Cervantes, DO   predniSONE 10 mg tablet Prednisone 10mg tablets PO taper. Taper by 10mg every three days. 4 tablets for 2 days;  3 tablets for 3 days; 2 tablets for 3 days, 1 tablets for 3 days Then stop  Patient not taking: Reported on 2023 8/3/23 10/11/23  DELIA Olivera   roflumilast (Daliresp) 250 MCG tablet Take 1 tablet (250 mcg total) by mouth daily  Patient not taking: Reported on 2023 8/30/23 10/11/23  DELIA Castanon     I have reviewed home medications with patient personally. Allergies: No Known Allergies    Social History:  Marital Status: /Civil Union   Occupation: Unemployed  Patient Pre-hospital Living Situation: Home  Patient Pre-hospital Level of Mobility: unable to be assessed at time of evaluation  Patient Pre-hospital Diet Restrictions: None  Substance Use History:   Social History     Substance and Sexual Activity   Alcohol Use Not Currently    Comment: rarely     Social History     Tobacco Use   Smoking Status Former    Packs/day: 2.50    Years: 42.00    Total pack years: 105.00    Types: Cigarettes    Start date: 5    Quit date:     Years since quittin.7   Smokeless Tobacco Former   Tobacco Comments    1 ppd for 37 years, 2010 down to 5 cigs a day, is around second hand smoke     Social History     Substance and Sexual Activity   Drug Use No       Family History:  Family History   Problem Relation Age of Onset    Diabetes Mother        Physical Exam:     Vitals:   Blood Pressure: 102/65 (10/11/23 1730)  Pulse: 85 (10/11/23 1730)  Temperature: 97.8 °F (36.6 °C) (10/11/23 1252)  Temp Source: Axillary (10/11/23 1252)  Respirations: (!) 24 (10/11/23 1730)  Height: 5' 2" (157.5 cm) (10/11/23 0942)  Weight - Scale: 44.5 kg (98 lb) (10/11/23 0942)  SpO2: 98 % (10/11/23 1730)    Physical Exam  Vitals and nursing note reviewed. Constitutional:       General: He is in acute distress.       Appearance: He is well-developed. HENT:      Head: Normocephalic and atraumatic. Eyes:      Conjunctiva/sclera: Conjunctivae normal.   Cardiovascular:      Rate and Rhythm: Normal rate and regular rhythm. Heart sounds: No murmur heard. Pulmonary:      Breath sounds: Wheezing (All over the lungs) present. Abdominal:      Palpations: Abdomen is soft. Tenderness: There is no abdominal tenderness. Musculoskeletal:         General: No swelling. Cervical back: Neck supple. Right lower le+ Pitting Edema (Above ankle) present. Left lower le+ Pitting Edema (Above ankle) present. Skin:     General: Skin is warm and dry. Capillary Refill: Capillary refill takes less than 2 seconds. Neurological:      Mental Status: He is alert and oriented to person, place, and time.    Psychiatric:         Mood and Affect: Mood normal.          Additional Data:     Lab Results:  Results from last 7 days   Lab Units 10/11/23  1009   WBC Thousand/uL 6.40   HEMOGLOBIN g/dL 11.4*   HEMATOCRIT % 36.8   PLATELETS Thousands/uL 253   NEUTROS PCT % 73   LYMPHS PCT % 12*   MONOS PCT % 14*   EOS PCT % 0     Results from last 7 days   Lab Units 10/11/23  1009   SODIUM mmol/L 129*   POTASSIUM mmol/L 3.2*   CHLORIDE mmol/L 74*   CO2 mmol/L >45*   BUN mg/dL 9   CREATININE mg/dL 0.62   CALCIUM mg/dL 9.6   ALBUMIN g/dL 4.3   TOTAL BILIRUBIN mg/dL 0.62   ALK PHOS U/L 62   ALT U/L 12   AST U/L 31   GLUCOSE RANDOM mg/dL 91                       Lines/Drains:  Invasive Devices       Peripheral Intravenous Line  Duration             Peripheral IV 10/11/23 Distal;Left;Upper;Ventral (anterior) Arm <1 day    Peripheral IV 10/11/23 Proximal;Right;Ventral (anterior) Forearm <1 day              Drain  Duration             External Urinary Catheter 72 days                        Imaging: Reviewed radiology reports from this admission including: chest xray and Personally reviewed the following imaging: chest xray  XR chest 1 view portable   Final Result by Sultana Bryant MD (10/11 1213)      Emphysema with no definite acute disease. Workstation performed: [id number]             EKG and Other Studies Reviewed on Admission:   EKG: NSR. HR 87.    ** Please Note: This note has been constructed using a voice recognition system.  **

## 2023-10-11 NOTE — ED PROVIDER NOTES
History  Chief Complaint   Patient presents with    Shortness of Breath     Patient and family sent to ED for further evaluation of increased SOB from Dr. Merlinda Broach. Patient with significant history of COPD. Patient lives on O2 at home, baseline. HPI    29-year-old male with history of COPD, CHF presents with SOB. He was sent over from his pulmonologist due to increased lethargy and shaking. Per wife, he has been more lethargic over the past 2 days, and has been "jerking" more in the past day. At baseline, he ambulates with a walker, but has had decreased movement due to fatigue and lethargy. He has had to use his albuterol inhaler more often, and nebulizer treatments are only temporarily helping. He is on 3 L nasal cannula at home, but has had to be intermittently bumped up to 4 L due to hypoxia. Denies fever, chills, change to sputum, chest pain, nausea, vomiting. Prior to Admission Medications   Prescriptions Last Dose Informant Patient Reported? Taking? Diclofenac Sodium (VOLTAREN) 1 % 10/10/2023 Spouse/Significant Other Yes Yes   Sig: APPLY 2 GRAMS 4 TIMES A DAY   Insulin Pen Needle (BD Pen Needle Cathie 2nd Gen) 32G X 4 MM MISC Not Taking Spouse/Significant Other No No   Sig: For use with insulin pen. Pharmacy may dispense brand covered by insurance. Patient not taking: Reported on 10/11/2023   Melatonin 5 MG TABS Past Month Spouse/Significant Other Yes Yes   Sig: Take 1 tablet by mouth daily at bedtime   NovoLOG FlexPen 100 units/mL injection pen Not Taking Spouse/Significant Other Yes No   Sig: INJECT 3 UNITS UDNER THE SKIN 3 TIMES A DAY WITH MEALS   Patient not taking: Reported on 3/10/1030   OneTouch Delica Lancets 36Q MISC Not Taking Spouse/Significant Other No No   Sig: May substitute brand based on insurance coverage. Check glucose TID.    Patient not taking: Reported on 10/11/2023   albuterol (PROVENTIL HFA,VENTOLIN HFA) 90 mcg/act inhaler 10/10/2023 Spouse/Significant Other No Yes   Sig: Inhale 2 puffs every 4 (four) hours as needed for wheezing or shortness of breath   budesonide (Pulmicort) 1 MG/2ML nebulizer solution 10/10/2023  No Yes   Sig: Take 2 mL (1 mg total) by nebulization daily Rinse mouth after use. cyanocobalamin (VITAMIN B-12) 1000 MCG tablet 10/10/2023 Spouse/Significant Other No Yes   Sig: Take 1 tablet (1,000 mcg total) by mouth daily   ergocalciferol (VITAMIN D2) 50,000 units 10/10/2023 Spouse/Significant Other Yes Yes   formoterol (PERFOROMIST) 20 MCG/2ML nebulizer solution 10/10/2023 Spouse/Significant Other No Yes   Sig: TAKE 2 ML (20 MCG TOTAL) BY NEBULIZATION 2 (TWO) TIMES A DAY   insuln lispro (HumaLOG KwikPen) 200 units/mL CONCENTRATED U-200 injection pen Not Taking Spouse/Significant Other No No   Sig: Inject 3 Units under the skin 3 (three) times a day with meals   Patient not taking: Reported on 10/11/2023   ipratropium-albuterol (DUO-NEB) 0.5-2.5 mg/3 mL nebulizer solution 10/10/2023  No Yes   Sig: Take 3 mL by nebulization 4 (four) times a day   predniSONE 5 mg tablet 10/10/2023 Spouse/Significant Other No Yes   Sig: Take 1 tablet (5 mg total) by mouth daily Resume prednisone 5mg daily when taper is completed Do not start before 2023.    rosuvastatin (CRESTOR) 10 MG tablet 10/10/2023 Spouse/Significant Other No Yes   Sig: Take 1 tablet (10 mg total) by mouth daily   sodium chloride (OCEAN) 0.65 % nasal spray 10/10/2023 Spouse/Significant Other No Yes   Si spray into each nostril every hour as needed for congestion   tamsulosin (FLOMAX) 0.4 mg Not Taking Spouse/Significant Other No No   Sig: Take 1 capsule (0.4 mg total) by mouth daily with dinner   Patient not taking: Reported on 2023   torsemide (DEMADEX) 10 mg tablet 10/10/2023  No Yes   Sig: Take 4 tablets (40 mg total) by mouth daily      Facility-Administered Medications: None       Past Medical History:   Diagnosis Date    Acute metabolic encephalopathy     Arthritis     Bladder mass Cardiomyopathy ([address])     Chest pain     COPD (chronic obstructive pulmonary disease) ([address])     COVID-19 virus infection 2023    CPAP (continuous positive airway pressure) dependence     Emphysema of lung (HCC)     Hypoxia     nocturnal    Left bundle branch block     Multiple pulmonary nodules     last assessed: 10/12/16    Pneumonia     Sleep apnea, obstructive     Smoker     Weight loss 2019       Past Surgical History:   Procedure Laterality Date    COLONOSCOPY      CYSTOSCOPY      CYSTOSCOPY  2021    ID BLADDER INSTILLATION ANTICARCINOGENIC AGENT N/A 1/3/2020    Procedure: INSTILLATION MITOMYCIN;  Surgeon: Akila Londono MD;  Location: [facility];  Service: Urology    ID CYSTO W/REMOVAL OF LESIONS SMALL N/A 1/3/2020    Procedure: TRANSURETHRAL RESECTION OF BLADDER TUMOR (TURBT); Surgeon: Akila Londono MD;  Location[facility];  Service: Urology    ID CYSTOURETHROSCOPY WITH BIOPSY N/A 2021    Procedure: Yogesh Huang;  Surgeon: Akila Londono MD;  [facility];  Service: Urology    ID TRURL ELECTROSURG RESCJ PROSTATE BLEED COMPLETE N/A 2021    Procedure: TRANSURETHRAL RESECTION OF PROSTATE (TURP) BLADDER BIOPSY, FULGURATION;  Surgeon: Akila Londono MD;  Location[facility];  Service: Urology       Family History   Problem Relation Age of Onset    Diabetes Mother      I have reviewed and agree with the history as documented.     E-Cigarette/Vaping    E-Cigarette Use Never User      E-Cigarette/Vaping Substances    Nicotine No     THC No     CBD No     Flavoring No     Other No     Unknown No      Social History     Tobacco Use    Smoking status: Former     Packs/day: 2.50     Years: 42.00     Total pack years: 105.00     Types: Cigarettes     Start date: 5     Quit date:      Years since quittin.7    Smokeless tobacco: Former    Tobacco comments:     1 ppd for 37 years, 2010 down to 5 cigs a day, is around second hand smoke   Vaping Use Vaping Use: Never used   Substance Use Topics    Alcohol use: Not Currently     Comment: rarely    Drug use: No        Review of Systems   Constitutional:  Positive for fatigue. Negative for chills and fever. HENT:  Negative for rhinorrhea and sore throat. Respiratory:  Positive for shortness of breath and wheezing. Negative for cough. Cardiovascular:  Negative for chest pain. Gastrointestinal:  Negative for abdominal pain, nausea and vomiting. Skin:  Negative for color change. Neurological:  Negative for dizziness and headaches. Physical Exam  ED Triage Vitals   Temperature Pulse Respirations Blood Pressure SpO2   10/11/23 0944 10/11/23 0942 10/11/23 0942 10/11/23 0942 10/11/23 0942   98.1 °F (36.7 °C) 90 22 105/64 94 %      Temp Source Heart Rate Source Patient Position - Orthostatic VS BP Location FiO2 (%)   10/11/23 0944 10/11/23 0942 10/11/23 1030 10/11/23 1030 10/13/23 0710   Oral Monitor Lying Left arm 30      Pain Score       10/11/23 0942       No Pain             Orthostatic Vital Signs  Vitals:    10/13/23 0812 10/13/23 1508 10/13/23 2133 10/14/23 0633   BP: 107/65 101/56 108/65 108/70   Pulse: 104 95 96 82   Patient Position - Orthostatic VS:           Physical Exam  Vitals and nursing note reviewed. Constitutional:       General: He is not in acute distress. Appearance: He is well-developed. He is cachectic. He is ill-appearing. Comments: Somnolent, but easily arousable. HENT:      Head: Normocephalic and atraumatic. Eyes:      Conjunctiva/sclera: Conjunctivae normal.   Cardiovascular:      Rate and Rhythm: Normal rate and regular rhythm. Heart sounds: No murmur heard. Pulmonary:      Effort: Pulmonary effort is normal. No respiratory distress. Breath sounds: Wheezing present. Abdominal:      Palpations: Abdomen is soft. Tenderness: There is no abdominal tenderness. Musculoskeletal:         General: No swelling. Cervical back: Neck supple. Right lower leg: Edema present. Left lower leg: Edema present. Skin:     General: Skin is warm and dry. Capillary Refill: Capillary refill takes less than 2 seconds. Neurological:      Mental Status: He is alert.    Psychiatric:         Mood and Affect: Mood normal.         ED Medications  Medications   methylPREDNISolone sodium succinate (Solu-MEDROL) injection 80 mg (80 mg Intravenous Given 10/11/23 1010)   potassium chloride (K-DUR,KLOR-CON) CR tablet 40 mEq (40 mEq Oral Given 10/11/23 2032)   potassium chloride 20 mEq IVPB (premix) (20 mEq Intravenous New Bag 10/13/23 1801)       Diagnostic Studies  Results Reviewed       Procedure Component Value Units Date/Time    CBC (With Platelets) [[id number]]  (Abnormal) Collected: 10/12/23 0427    Lab Status: Final result Specimen: Blood from Arm, Left Updated: 10/12/23 0801     WBC 1.90 Thousand/uL      RBC 3.47 Million/uL      Hemoglobin 10.4 g/dL      Hematocrit 33.0 %      MCV 95 fL      MCH 30.0 pg      MCHC 31.5 g/dL      RDW 12.3 %      Platelets 911 Thousands/uL      MPV 9.4 fL     Basic metabolic panel [[id number]]  (Abnormal) Collected: 10/12/23 0427    Lab Status: Final result Specimen: Blood from Arm, Left Updated: 10/12/23 0618     Sodium 128 mmol/L      Potassium 3.9 mmol/L      Chloride 78 mmol/L      CO2 >45 mmol/L      ANION GAP --     BUN 14 mg/dL      Creatinine 0.57 mg/dL      Glucose 122 mg/dL      Calcium 9.2 mg/dL      eGFR 107 ml/min/1.73sq m     Narrative:      [place] guidelines for Chronic Kidney Disease (CKD):     Stage 1 with normal or high GFR (GFR > 90 mL/min/1.73 square meters)    Stage 2 Mild CKD (GFR = 60-89 mL/min/1.73 square meters)    Stage 3A Moderate CKD (GFR = 45-59 mL/min/1.73 square meters)    Stage 3B Moderate CKD (GFR = 30-44 mL/min/1.73 square meters)    Stage 4 Severe CKD (GFR = 15-29 mL/min/1.73 square meters)    Stage 5 End Stage CKD (GFR <15 mL/min/1.73 square meters)  Note: GFR calculation is accurate only with a steady state creatinine    Blood gas, venous [[id number]]  (Abnormal) Collected: 10/11/23 1247    Lab Status: Final result Specimen: Blood from Arm, Right Updated: 10/11/23 1259     pH, Oscar 7.348     pCO2, Oscar 97.7 mm Hg      pO2, Oscar 52.2 mm Hg      HCO3, Oscar 52.5 mmol/L      Base Excess, Oscar 22.1 mmol/L      O2 Content, Oscar 13.2 ml/dL      O2 HGB, VENOUS 80.4 %     B-Type Natriuretic Peptide(BNP) [[id number]]  (Abnormal) Collected: 10/11/23 1009    Lab Status: Final result Specimen: Blood from Arm, Left Updated: 10/11/23 1058      pg/mL     Comprehensive metabolic panel [[id number]]  (Abnormal) Collected: 10/11/23 1009    Lab Status: Final result Specimen: Blood from Arm, Left Updated: 10/11/23 1057     Sodium 129 mmol/L      Potassium 3.2 mmol/L      Chloride 74 mmol/L      CO2 >45 mmol/L      ANION GAP --     BUN 9 mg/dL      Creatinine 0.62 mg/dL      Glucose 91 mg/dL      Calcium 9.6 mg/dL      AST 31 U/L      ALT 12 U/L      Alkaline Phosphatase 62 U/L      Total Protein 7.1 g/dL      Albumin 4.3 g/dL      Total Bilirubin 0.62 mg/dL      eGFR 103 ml/min/1.73sq m     Narrative:      [facility] guidelines for Chronic Kidney Disease (CKD):     Stage 1 with normal or high GFR (GFR > 90 mL/min/1.73 square meters)    Stage 2 Mild CKD (GFR = 60-89 mL/min/1.73 square meters)    Stage 3A Moderate CKD (GFR = 45-59 mL/min/1.73 square meters)    Stage 3B Moderate CKD (GFR = 30-44 mL/min/1.73 square meters)    Stage 4 Severe CKD (GFR = 15-29 mL/min/1.73 square meters)    Stage 5 End Stage CKD (GFR <15 mL/min/1.73 square meters)  Note: GFR calculation is accurate only with a steady state creatinine    Blood gas, venous [[id number]]  (Abnormal) Collected: 10/11/23 1004    Lab Status: Final result Specimen: Blood from Arm, Left Updated: 10/11/23 1033     pH, Oscar 7.299     pCO2, Oscar 106.0 mm Hg      pO2, Oscar 27.6 mm Hg      HCO3, Oscar 50.9 mmol/L      Base Excess, Oscar 19.2 mmol/L      O2 Content, Oscar 8.3 ml/dL      O2 HGB, VENOUS 45.2 %     CBC and differential [[id number]]  (Abnormal) Collected: 10/11/23 1009    Lab Status: Final result Specimen: Blood from Arm, Left Updated: 10/11/23 1018     WBC 6.40 Thousand/uL      RBC 3.76 Million/uL      Hemoglobin 11.4 g/dL      Hematocrit 36.8 %      MCV 98 fL      MCH 30.3 pg      MCHC 31.0 g/dL      RDW 12.2 %      MPV 9.3 fL      Platelets 313 Thousands/uL      nRBC 0 /100 WBCs      Neutrophils Relative 73 %      Immat GRANS % 0 %      Lymphocytes Relative 12 %      Monocytes Relative 14 %      Eosinophils Relative 0 %      Basophils Relative 1 %      Neutrophils Absolute 4.64 Thousands/µL      Immature Grans Absolute 0.01 Thousand/uL      Lymphocytes Absolute 0.79 Thousands/µL      Monocytes Absolute 0.92 Thousand/µL      Eosinophils Absolute 0.01 Thousand/µL      Basophils Absolute 0.03 Thousands/µL                    XR chest 1 view portable   Final Result by Prince Aime MD (10/11 1213)      Emphysema with no definite acute disease. Workstation performed: [id number]               Procedures  ECG 12 Lead Documentation Only    Date/Time: 10/11/2023 10:15 AM    Performed by: Christine Mancera MD  Authorized by: Christine Mancera MD    Indications / Diagnosis:  SOB  ECG reviewed by me, the ED Provider: yes    Patient location:  ED  Previous ECG:     Previous ECG:  Compared to current    Comparison ECG info:  8/2/2023  Rate:     ECG rate:  87    ECG rate assessment: normal    Rhythm:     Rhythm: sinus rhythm    QRS:     QRS axis:  Normal  Comments:      No STEMI or ischemic changes. Compared to EKG of 8/2/2023, no T wave inversion on this EKG. ED Course  ED Course as of 10/16/23 0643   Wed Oct 11, 2023   1101 Blood gas, venous(!)  pH 7.299, PCO2 106, bicarb 50.9. Will start patient on BiPAP to help breathe of CO2.     After initial DuoNeb treatment for wheezing, patient still has expiratory wheezing, R>L.  2 additional DuoNebs ordered. [phone] Given patient's clinical presentation, respiratory acidosis, and hypercapnia patient will require admission at this time. Will reach out to critical care for evaluation. 1132 Discussed with critical care AP, and he recommends BiPAP trial, repeat VBG, and admission to floors if he is responsive to that management. 1301 Blood gas, venous(!)  Improved on BIPAP. Will reach out to [facility] for admission. SBIRT 22yo+      Flowsheet Row Most Recent Value   Initial Alcohol Screen: US AUDIT-C     1. How often do you have a drink containing alcohol? 0 Filed at: 10/11/2023 0946   2. How many drinks containing alcohol do you have on a typical day you are drinking? 0 Filed at: 10/11/2023 0946   3b. FEMALE Any Age, or MALE 65+: How often do you have 4 or more drinks on one occassion? 0 Filed at: 10/11/2023 0946   Audit-C Score 0 Filed at: 10/11/2023 8722   SAIMA: How many times in the past year have you. .. Used an illegal drug or used a prescription medication for non-medical reasons? Never Filed at: 10/11/2023 5788                  Medical Decision Making  See ED Course. Amount and/or Complexity of Data Reviewed  Labs: ordered. Decision-making details documented in ED Course. Radiology: ordered. Risk  Prescription drug management. Decision regarding hospitalization.           Disposition  Final diagnoses:   COPD exacerbation ([address])   Hypercapnic respiratory failure ([address])     Time reflects when diagnosis was documented in both MDM as applicable and the Disposition within this note       Time User Action Codes Description Comment    10/11/2023 11:36 AM [employer] Add [J44.1] COPD exacerbation ([address])     10/11/2023 11:36 AM [employer] Add [J96.92] Hypercapnic respiratory failure ([address])     10/12/2023 11:01 AM Tabitha Glez Add [E43] Severe protein-calorie malnutrition      10/13/2023  9:24 AM Emelina [facility] Add [J96.01,  J96.02] Acute respiratory failure with hypoxia and hypercapnia ([address])     10/13/2023  9:24 AM [facility] Add Magda.[email or web address],  J44.9] Pulmonary cachexia due to COPD ([address])     10/14/2023 12:12 PM [employer] MARIA Add [J96.00] Acute respiratory failure, unspecified whether with hypoxia or hypercapnia ([address])     10/14/2023 12:18 PM Olya Swan I Add [E87.1] Chronic hyponatremia           ED Disposition       ED Disposition   Admit    Condition   Stable    Date/Time   Wed Oct 11, 2023 1551    Comment   Case was discussed with Dr Shauna Saeed and the patient's admission status was agreed to be Admission Status: inpatient status to the service of Dr. Shauna Saeed. Follow-up Information    None         Discharge Medication List as of 10/14/2023  2:28 PM        START taking these medications    Details   sodium chloride 1 g tablet Take 1 tablet (1 g total) by mouth 2 (two) times a day with meals, Starting Sat 10/14/2023, Normal           CONTINUE these medications which have CHANGED    Details   ! ! predniSONE 10 mg tablet Multiple Dosages:Starting Sat 10/14/2023, Until Mon 10/16/2023 at 2359, THEN Starting Tue 10/17/2023, Until Thu 10/19/2023 at 2359, THEN Starting Fri 10/20/2023, Until Sun 10/22/2023 at 2359, THEN Starting Mon 10/23/2023, Until Wed 10/25/2023 at 2359 Take 5 tablets (50 mg total) by mouth daily for 3 days, THEN 4 tablets (40 mg total) daily for 3 days, THEN 2 tablets (20 mg total) daily for 3 days, THEN 1 tablet (10 mg total) daily for 3 days. , Normal      !! predniSONE 5 mg tablet Take 1 tablet (5 mg total) by mouth daily Resume prednisone 5mg daily when taper is completed, Starting Sat 10/14/2023, Normal       !! - Potential duplicate medications found. Please discuss with provider.         CONTINUE these medications which have NOT CHANGED    Details   albuterol (PROVENTIL HFA,VENTOLIN HFA) 90 mcg/act inhaler Inhale 2 puffs every 4 (four) hours as needed for wheezing or shortness of breath, Starting Fri 8/4/2023, Normal budesonide (Pulmicort) 1 MG/2ML nebulizer solution Take 2 mL (1 mg total) by nebulization daily Rinse mouth after use., Starting Tue 9/19/2023, Normal      cyanocobalamin (VITAMIN B-12) 1000 MCG tablet Take 1 tablet (1,000 mcg total) by mouth daily, Starting Wed 5/10/2023, Normal      Diclofenac Sodium (VOLTAREN) 1 % APPLY 2 GRAMS 4 TIMES A DAY, Historical Med      ergocalciferol (VITAMIN D2) 50,000 units Starting Wed 9/6/2023, Historical Med      formoterol (PERFOROMIST) 20 MCG/2ML nebulizer solution TAKE 2 ML (20 MCG TOTAL) BY NEBULIZATION 2 (TWO) TIMES A DAY, Normal      ipratropium-albuterol (DUO-NEB) 0.5-2.5 mg/3 mL nebulizer solution Take 3 mL by nebulization 4 (four) times a day, Starting Tue 9/19/2023, Normal      Melatonin 5 MG TABS Take 1 tablet by mouth daily at bedtime, Starting Mon 3/13/2023, Historical Med      rosuvastatin (CRESTOR) 10 MG tablet Take 1 tablet (10 mg total) by mouth daily, Starting Mon 3/27/2023, Normal      sodium chloride (OCEAN) 0.65 % nasal spray 1 spray into each nostril every hour as needed for congestion, Starting Mon 6/12/2023, Normal      torsemide (DEMADEX) 10 mg tablet Take 4 tablets (40 mg total) by mouth daily, Starting Thu 9/28/2023, Normal      Insulin Pen Needle (BD Pen Needle Cathie 2nd Gen) 32G X 4 MM MISC For use with insulin pen. Pharmacy may dispense brand covered by insurance., Normal      insuln lispro (HumaLOG KwikPen) 200 units/mL CONCENTRATED U-200 injection pen Inject 3 Units under the skin 3 (three) times a day with meals, Starting Thu 8/3/2023, Normal      NovoLOG FlexPen 100 units/mL injection pen INJECT 3 UNITS UDNER THE SKIN 3 TIMES A DAY WITH MEALS, Historical Med      OneTouch Delica Lancets 08P MISC May substitute brand based on insurance coverage. Check glucose TID., Normal      tamsulosin (FLOMAX) 0.4 mg Take 1 capsule (0.4 mg total) by mouth daily with dinner, Starting Tue 2/28/2023, No Print           No discharge procedures on file.     PDMP Review         Value Time User    PDMP Reviewed  Yes 9/1/2023  8:21 AM Ryan Kessler PA-C             ED Provider  Attending physically available and evaluated Susan Patella. . I managed the patient along with the ED Attending.     Electronically Signed by           Carrie Wilson MD  10/16/23 2802

## 2023-10-11 NOTE — ED ATTENDING ATTESTATION
10/11/2023  Sam Becker DO, saw and evaluated the patient. I have discussed the patient with the resident/non-physician practitioner and agree with the resident's/non-physician practitioner's findings, Plan of Care, and MDM as documented in the resident's/non-physician practitioner's note, except where noted. All available labs and Radiology studies were reviewed. I was present for key portions of any procedure(s) performed by the resident/non-physician practitioner and I was immediately available to provide assistance. At this point I agree with the current assessment done in the Emergency Department. I have conducted an independent evaluation of this patient a history and physical is as follows:    79-year-old male with past medical history significant for end-stage COPD on 3 L by nasal cannula at baseline, CHF who presents to the ED for evaluation of generalized fatigue, shortness of breath. History is provided by both patient and wife. Over the past 2 days, has been more somnolent than usual, decreased level of activity. He is using nebulizer treatments more frequently as well as intermittently increasing his oxygen supplementation due to hypoxia. Has been told to maintain saturation greater than 88%. Was initially seen in the pulmonary office earlier this morning but given signs/symptoms, was referred to the ED for further evaluation. Wife states that when he had this constellation of symptoms in the past, he ultimately required intubation and hospital stay as a result of elevated CO2 levels. On my exam, patient is somnolent but arousable, no complaints of pain, does have diffuse wheezing in all lung fields, no chest pain, abdomen soft nontender nondistended, pitting edema in bilateral lower extremities. Will obtain labs, EKG, chest x-ray. I spoke with the patient's physician, Dr. Denise Aguilar, which is where he was initially evaluated this morning prior to presenting to the ED.   She is equally as concerned for hypercapnia, also possible infectious process. Defers ultimate disposition to us here in the emergency department however requests that we reach out to her again if we are able to discharge the patient as she plans to make changes to his home regimen if he will be discharged today. Chest x-ray shows no overt changes compared to prior imaging. Labs show no leukocytosis, stable hemoglobin, elevated BNP which is similar to prior range, hyponatremia 129, hypokalemia 3.2, hypochloremia 74, significantly elevated bicarb, VBG with pH of 7.299, PCO2 of 106, bicarb of 50.9. He received nebulizer treatments and steroids. Results discussed. Recommended BiPAP to the patient to which he is agreeable. Plan will be for hospitalization to which he and family are agreeable. Dr. Elmer Ga updated. Critical care evaluated the patient, patient to be admitted to [facility] service. Accepted onto their service for further care and management.   Stable at the time of disposition        ED Course         Critical Care Time  Procedures

## 2023-10-11 NOTE — ASSESSMENT & PLAN NOTE
He does have some wheezing on exam today. He is likely having a COPD exacerbation, or just worsening of his baseline severe COPD. If he does not get admitted to the hospital, will do a outpatient prednisone taper. Continue all nebulized regimen.

## 2023-10-11 NOTE — PROGRESS NOTES
Assessment:    Chronic respiratory failure with hypoxia and hypercapnia (HCC)  In the office today he continues to fall asleep and have jerking motions. I am concerned that he has worsening of his underlying hypercapnia despite using trilogy vent at night. Plan to send to ED for evaluation and possible admission. COPD exacerbation  He does have some wheezing on exam today. He is likely having a COPD exacerbation, or just worsening of his baseline severe COPD. If he does not get admitted to the hospital, will do a outpatient prednisone taper. Continue all nebulized regimen. Pulmonary nodules/lesions, multiple  Most recent chest x-ray and imaging shows right lower lobe pulmonary opacities. He has been treated with antibiotics. We will continue to monitor. He likely will get repeat imaging in the emergency room today. Plan:    Diagnoses and all orders for this visit:    Pulmonary emphysema, unspecified emphysema type ([address])  -     Blood gas, arterial; Future    Chronic respiratory failure with hypoxia and hypercapnia (HCC)  -     Blood gas, arterial; Future    COPD exacerbation  -     Transfer to other facility    Pulmonary nodules/lesions, multiple      I spoke at length with the patient, his wife and son. We discussed that despite the most aggressive treatment, he continues to have worsening of his underlying health conditions. He is appropriate for hospice, but in the past has declined. This time he would like to be evaluated in the ED and is okay with admission. I recommended that at the very least, that he should be a level 3 CODE STATUS. Would continue ongoing conversations about palliative care. I did speak to Dr Kayleen Gastelum in the ED to discuss my concerns    Follow-up: To be determined after ED      HPI:  Overall the patient continues to have slow decline.   He was admitted last in August.  His wife and son noticed that over the past couple of weeks he has had increased fatigue, falling asleep, leaning to one side. This does not change between daytime or evening time. He is using trilogy vent at night. He notices that when he first comes off of that in the morning he feels like it is very difficult for him to breathe. He is currently on O2 3L - up to 5-7L with activity. He is using an all nebulized regimen. His prednisone has been decreased to 5 mg daily. Overall his wife reports that he is very weak. He is in a wheelchair today. He does not essentially leave the house except to come to these visits. Review of Systems   Constitutional:  Positive for activity change and fatigue. Negative for fever. Respiratory:  Positive for shortness of breath and wheezing. Cardiovascular:  Positive for leg swelling. Musculoskeletal:  Positive for gait problem. Neurological:  Positive for weakness. Psychiatric/Behavioral:  Positive for decreased concentration. Negative for sleep disturbance. All other systems reviewed and are negative. The following portions of the patient's history were reviewed and updated as appropriate: allergies, current medications, past family history, past medical history, past social history, past surgical history, and problem list.    VITALS:  Vitals:    10/11/23 0842 10/11/23 0845   BP: 102/60    BP Location: Left arm    Patient Position: Sitting    Cuff Size: Adult    Pulse: 95    Resp: 18    Temp: (!) 96.4 °F (35.8 °C)    TempSrc: Tympanic    SpO2: 94% 94%   Weight: 44.5 kg (98 lb)    Height: 5' 2" (1.575 m)        Physical Exam  General:  Patient is awake, but intermittently falling asleep  Neck: No JVD  CV:  Regular, +S1 and S2, No murmurs, gallops or rubs appreciated  Lungs: Greatly diminished breath sounds in all lung fields with expiratory wheeze bilateral  Abdomen: Soft, +BS, Non-tender, non-distended  Extremities: Positive lower extremity edema  Neuro:  In wheelchair        Diagnostic Testing:      CBC:  Lab Results   Component Value Date    WBC 6.40 10/11/2023    HGB 11.4 (L) 10/11/2023    HCT 36.8 10/11/2023    MCV 98 10/11/2023     10/11/2023         BMP:   Lab Results   Component Value Date     08/17/2015    K 3.2 (L) 10/11/2023    CL 74 (L) 10/11/2023    CO2 >45 (HH) 10/11/2023    ANIONGAP 6 08/17/2015    BUN 9 10/11/2023    CREATININE 0.62 10/11/2023    GLUCOSE 90 08/17/2015    GLUF 124 (H) 03/30/2023    CALCIUM 9.6 10/11/2023    CORRECTEDCA 10.0 07/23/2023    AST 31 10/11/2023    ALT 12 10/11/2023    ALKPHOS 62 10/11/2023    PROT 6.8 08/17/2015    BILITOT 0.60 08/17/2015    EGFR 103 10/11/2023     Most recent BMP from last month shows rising bicarb    Imaging studies:  I have personally reviewed pertinent reports.    and I have personally reviewed pertinent films in PACS  Chest x-ray August 30, 2023-persistent right pulmonary opacities, severe COPD      Delma Man DO

## 2023-10-11 NOTE — ASSESSMENT & PLAN NOTE
Patient came in with shortness of breath. Was lethargic since past 2 days. Referred to ED by his pulmonologist.  In ED VBG showed acidosis with hypoxia and hypercapnia  Patient use trilogy vent at night, in daytime use 3 L O2 at rest and 4 L O2 on exertion  Last PFT(2020): FEV1/FVC-30%, spirometry showed airflow obstruction  CT chest(7/31/23): Moderate patchy bilateral groundglass opacity and consolidation, new from December 2022, infectious/inflammatory. New 6 mm right upper lobe nodule. Per 2017 Fleischner Society guidelines, recommend follow-up with a chest CT in 6 months. Moderate emphysema. Etiology: COPD exacerbation vs component of heart failure  Patient was saturating well at 4 liter oxygen this morning but started to desatuarate in the 80s with 6L oxygen.      Plan:  Monitor SPO2  COVID 19 testing   Patient will be placed back on BiPAP  BiPAP at night  Continue home O2 at daytime  Respiratory protocol  VBG at noon today

## 2023-10-11 NOTE — ASSESSMENT & PLAN NOTE
Lab Results   Component Value Date    SODIUM 129 (L) 10/11/2023    SODIUM 136 08/30/2023   Patient has a chronic hyponatremia.   Baseline 125-135  Monitor BMP daily  Fluid restriction of 1500 mL

## 2023-10-11 NOTE — ASSESSMENT & PLAN NOTE
Lab Results   Component Value Date    HGBA1C 6.5 (H) 08/30/2023       No results for input(s): "POCGLU" in the last 72 hours. Blood Sugar Average: Last 72 hrs:    Patient A1c elevated to 6.5 from 5.7 secondary to steroid  Since his last admission patient was discharged on Humalog but is not compliant with it.   Monitor BG daily  Sliding scale insulin  Hypoglycemia protocol

## 2023-10-11 NOTE — ASSESSMENT & PLAN NOTE
Lab Results   Component Value Date    SODIUM 129 (L) 10/11/2023    SODIUM 136 08/30/2023   Patient has a chronic hyponatremia.   Baseline 125-135  Monitor BMP daily  Fluid restriction of 1200 mL  1 g Nacl tablets

## 2023-10-11 NOTE — ASSESSMENT & PLAN NOTE
Most recent chest x-ray and imaging shows right lower lobe pulmonary opacities. He has been treated with antibiotics. We will continue to monitor. He likely will get repeat imaging in the emergency room today.

## 2023-10-12 LAB
BASE EX.OXY STD BLDV CALC-SCNC: 92.4 % (ref 60–80)
BASE EXCESS BLDV CALC-SCNC: 16.6 MMOL/L
BASOPHILS # BLD AUTO: 0 THOUSANDS/ÂΜL (ref 0–0.1)
BASOPHILS NFR BLD AUTO: 0 % (ref 0–1)
BUN SERPL-MCNC: 14 MG/DL (ref 5–25)
CALCIUM SERPL-MCNC: 9.2 MG/DL (ref 8.4–10.2)
CHLORIDE SERPL-SCNC: 78 MMOL/L (ref 96–108)
CO2 SERPL-SCNC: >45 MMOL/L (ref 21–32)
CREAT SERPL-MCNC: 0.57 MG/DL (ref 0.6–1.3)
EOSINOPHIL # BLD AUTO: 0 THOUSAND/ÂΜL (ref 0–0.61)
EOSINOPHIL NFR BLD AUTO: 0 % (ref 0–6)
ERYTHROCYTE [DISTWIDTH] IN BLOOD BY AUTOMATED COUNT: 12.3 % (ref 11.6–15.1)
ERYTHROCYTE [DISTWIDTH] IN BLOOD BY AUTOMATED COUNT: 12.3 % (ref 11.6–15.1)
FLUAV RNA RESP QL NAA+PROBE: NEGATIVE
FLUBV RNA RESP QL NAA+PROBE: NEGATIVE
GFR SERPL CREATININE-BSD FRML MDRD: 107 ML/MIN/1.73SQ M
GLUCOSE SERPL-MCNC: 122 MG/DL (ref 65–140)
GLUCOSE SERPL-MCNC: 125 MG/DL (ref 65–140)
GLUCOSE SERPL-MCNC: 127 MG/DL (ref 65–140)
GLUCOSE SERPL-MCNC: 175 MG/DL (ref 65–140)
GLUCOSE SERPL-MCNC: 184 MG/DL (ref 65–140)
HCO3 BLDV-SCNC: 42.8 MMOL/L (ref 24–30)
HCT VFR BLD AUTO: 33 % (ref 36.5–49.3)
HCT VFR BLD AUTO: 33.7 % (ref 36.5–49.3)
HGB BLD-MCNC: 10.4 G/DL (ref 12–17)
HGB BLD-MCNC: 10.8 G/DL (ref 12–17)
IMM GRANULOCYTES # BLD AUTO: 0.01 THOUSAND/UL (ref 0–0.2)
IMM GRANULOCYTES NFR BLD AUTO: 0 % (ref 0–2)
LYMPHOCYTES # BLD AUTO: 0.25 THOUSANDS/ÂΜL (ref 0.6–4.47)
LYMPHOCYTES NFR BLD AUTO: 9 % (ref 14–44)
MCH RBC QN AUTO: 30 PG (ref 26.8–34.3)
MCH RBC QN AUTO: 31.2 PG (ref 26.8–34.3)
MCHC RBC AUTO-ENTMCNC: 31.5 G/DL (ref 31.4–37.4)
MCHC RBC AUTO-ENTMCNC: 32 G/DL (ref 31.4–37.4)
MCV RBC AUTO: 95 FL (ref 82–98)
MCV RBC AUTO: 97 FL (ref 82–98)
MONOCYTES # BLD AUTO: 0.15 THOUSAND/ÂΜL (ref 0.17–1.22)
MONOCYTES NFR BLD AUTO: 6 % (ref 4–12)
NEUTROPHILS # BLD AUTO: 2.34 THOUSANDS/ÂΜL (ref 1.85–7.62)
NEUTS SEG NFR BLD AUTO: 85 % (ref 43–75)
NRBC BLD AUTO-RTO: 0 /100 WBCS
O2 CT BLDV-SCNC: 15 ML/DL
PCO2 BLDV: 59.5 MM HG (ref 42–50)
PH BLDV: 7.47 [PH] (ref 7.3–7.4)
PLATELET # BLD AUTO: 217 THOUSANDS/UL (ref 149–390)
PLATELET # BLD AUTO: 232 THOUSANDS/UL (ref 149–390)
PMV BLD AUTO: 9 FL (ref 8.9–12.7)
PMV BLD AUTO: 9.4 FL (ref 8.9–12.7)
PO2 BLDV: 80.7 MM HG (ref 35–45)
POTASSIUM SERPL-SCNC: 3.9 MMOL/L (ref 3.5–5.3)
RBC # BLD AUTO: 3.46 MILLION/UL (ref 3.88–5.62)
RBC # BLD AUTO: 3.47 MILLION/UL (ref 3.88–5.62)
RSV RNA RESP QL NAA+PROBE: NEGATIVE
SARS-COV-2 RNA RESP QL NAA+PROBE: NEGATIVE
SODIUM SERPL-SCNC: 128 MMOL/L (ref 135–147)
WBC # BLD AUTO: 1.9 THOUSAND/UL (ref 4.31–10.16)
WBC # BLD AUTO: 2.75 THOUSAND/UL (ref 4.31–10.16)

## 2023-10-12 PROCEDURE — 94660 CPAP INITIATION&MGMT: CPT

## 2023-10-12 PROCEDURE — 85025 COMPLETE CBC W/AUTO DIFF WBC: CPT

## 2023-10-12 PROCEDURE — 94640 AIRWAY INHALATION TREATMENT: CPT

## 2023-10-12 PROCEDURE — 82805 BLOOD GASES W/O2 SATURATION: CPT

## 2023-10-12 PROCEDURE — 0241U HB NFCT DS VIR RESP RNA 4 TRGT: CPT

## 2023-10-12 PROCEDURE — 85027 COMPLETE CBC AUTOMATED: CPT

## 2023-10-12 PROCEDURE — 94760 N-INVAS EAR/PLS OXIMETRY 1: CPT

## 2023-10-12 PROCEDURE — 82948 REAGENT STRIP/BLOOD GLUCOSE: CPT

## 2023-10-12 PROCEDURE — 99232 SBSQ HOSP IP/OBS MODERATE 35: CPT | Performed by: INTERNAL MEDICINE

## 2023-10-12 PROCEDURE — 80048 BASIC METABOLIC PNL TOTAL CA: CPT

## 2023-10-12 RX ORDER — SODIUM CHLORIDE 1 G/1
1 TABLET ORAL 2 TIMES DAILY WITH MEALS
Status: DISCONTINUED | OUTPATIENT
Start: 2023-10-12 | End: 2023-10-14 | Stop reason: HOSPADM

## 2023-10-12 RX ORDER — SODIUM CHLORIDE 1 G/1
1 TABLET ORAL
Status: DISCONTINUED | OUTPATIENT
Start: 2023-10-12 | End: 2023-10-12

## 2023-10-12 RX ADMIN — LEVALBUTEROL HYDROCHLORIDE 1.25 MG: 1.25 SOLUTION RESPIRATORY (INHALATION) at 13:23

## 2023-10-12 RX ADMIN — PRAVASTATIN SODIUM 80 MG: 80 TABLET ORAL at 16:47

## 2023-10-12 RX ADMIN — FORMOTEROL FUMARATE DIHYDRATE 20 MCG: 20 SOLUTION RESPIRATORY (INHALATION) at 07:56

## 2023-10-12 RX ADMIN — FORMOTEROL FUMARATE DIHYDRATE 20 MCG: 20 SOLUTION RESPIRATORY (INHALATION) at 19:47

## 2023-10-12 RX ADMIN — BUDESONIDE 1 MG: 0.5 INHALANT ORAL at 07:56

## 2023-10-12 RX ADMIN — IPRATROPIUM BROMIDE 0.5 MG: 0.5 SOLUTION RESPIRATORY (INHALATION) at 07:58

## 2023-10-12 RX ADMIN — INSULIN LISPRO 1 UNITS: 100 INJECTION, SOLUTION INTRAVENOUS; SUBCUTANEOUS at 16:47

## 2023-10-12 RX ADMIN — METHYLPREDNISOLONE SODIUM SUCCINATE 40 MG: 40 INJECTION, POWDER, FOR SOLUTION INTRAMUSCULAR; INTRAVENOUS at 12:28

## 2023-10-12 RX ADMIN — IPRATROPIUM BROMIDE 0.5 MG: 0.5 SOLUTION RESPIRATORY (INHALATION) at 13:23

## 2023-10-12 RX ADMIN — HEPARIN SODIUM 5000 UNITS: 5000 INJECTION INTRAVENOUS; SUBCUTANEOUS at 05:18

## 2023-10-12 RX ADMIN — LEVALBUTEROL HYDROCHLORIDE 1.25 MG: 1.25 SOLUTION RESPIRATORY (INHALATION) at 19:47

## 2023-10-12 RX ADMIN — HEPARIN SODIUM 5000 UNITS: 5000 INJECTION INTRAVENOUS; SUBCUTANEOUS at 16:47

## 2023-10-12 RX ADMIN — LEVALBUTEROL HYDROCHLORIDE 1.25 MG: 1.25 SOLUTION RESPIRATORY (INHALATION) at 07:56

## 2023-10-12 RX ADMIN — INSULIN LISPRO 1 UNITS: 100 INJECTION, SOLUTION INTRAVENOUS; SUBCUTANEOUS at 12:28

## 2023-10-12 RX ADMIN — TORSEMIDE 40 MG: 20 TABLET ORAL at 08:38

## 2023-10-12 RX ADMIN — METHYLPREDNISOLONE SODIUM SUCCINATE 40 MG: 40 INJECTION, POWDER, FOR SOLUTION INTRAMUSCULAR; INTRAVENOUS at 21:24

## 2023-10-12 RX ADMIN — HEPARIN SODIUM 5000 UNITS: 5000 INJECTION INTRAVENOUS; SUBCUTANEOUS at 21:24

## 2023-10-12 RX ADMIN — METHYLPREDNISOLONE SODIUM SUCCINATE 40 MG: 40 INJECTION, POWDER, FOR SOLUTION INTRAMUSCULAR; INTRAVENOUS at 02:15

## 2023-10-12 RX ADMIN — IPRATROPIUM BROMIDE 0.5 MG: 0.5 SOLUTION RESPIRATORY (INHALATION) at 19:47

## 2023-10-12 RX ADMIN — SODIUM CHLORIDE 1 G: 1 TABLET ORAL at 16:47

## 2023-10-12 RX ADMIN — Medication 1000 MCG: at 08:38

## 2023-10-12 NOTE — UTILIZATION REVIEW
Initial Clinical Review    Admission: Date/Time/Statement:   Admission Orders (From admission, onward)       Ordered        10/11/23 1559  INPATIENT ADMISSION  Once                          Orders Placed This Encounter   Procedures    INPATIENT ADMISSION     Standing Status:   Standing     Number of Occurrences:   1     Order Specific Question:   Level of Care     Answer:   Med Surg [16]     Order Specific Question:   Estimated length of stay     Answer:   Not Applicable     ED Arrival Information       Expected   10/11/2023 09:22    Arrival   10/11/2023 09:31    Acuity   Emergent              Means of arrival   Walk-In    Escorted by   Self    Service   Hospitalist    Admission type   Emergency              Arrival complaint   emphysema             Chief Complaint   Patient presents with    Shortness of Breath     Patient and family sent to ED for further evaluation of increased SOB from Dr. Mike Angel. Patient with significant history of COPD. Patient lives on O2 at home, baseline. Initial Presentation: 79 y.o. male PMH of COPD on homer O2,  Last PFT(2020): FEV1/FVC-30%,   HFrEF-NYHA Class III., Stage C   HLD, prediabetes, chronic hyponatremia,  prostate cancer s/p [id number]) who presents to ED from pulmonology office with shortness of breath . Wife reports increased lethargy past 2 days, decreased appetite. Pt uses trilogy vent at night but recently especially in the morning his SPO2 drops down to 88-89 and after using nebulizers it improves to 95-97, in the daytime he uses 3 L O2 at rest/4 L O2 on exertion. Pt reports med compliance with all meds except insulin, not always elevating BLE . On exam, pt tachypneic, in acute distress ,all lung fields with wheezing, 2 + pitting BLE edema. Labs- elevated BNP, CO2 >45. VBG showed acidosis with hypoxia and hypercapnia  . Na 129 with baseline 125-135  . CXR shows emphysema . Pt put on BIPAP, given multiple Duonebs, IV steroids.  Resp status improved and pt able to be taken off BI PAP . Pt admitted as inpatient to telemetry with  acute resp failure w/ hypoxia and hypercapnia- etiology of COPD exac vs component heart failure . Roya Marvin PLan - Bi PAP at HS, O2 @ 3-4 L NC, Goal O2 sat 88-92% . IV Solumedrol. Xopenex/Atrovent nebs TID . Monitor resp status , O2 sats . Daily wt, I/O FR 1500 ml , reg diet. Torsemide daily . CMP,  Mag in am . Daily BMP . Date: 10/12   Day 2:    Pt cachectic, on O2 -was saturating well at 4 liter oxygen this morning but started to desatuarate in the 80s with 6L oxygen. Pt has decreased breath sounds , mild expir wheezes. No significant edema . Bed scale weight 102 today     Plan - continue IV steroids, neb tx. . F/U COVID testing. VBG today @ 1200 today . Pt placed back on BiPAP this afternoon . Nutrition supplements. PT/OT . Continue salt tabs and FR for chronic hyponatremia .      ED Triage Vitals   Temperature Pulse Respirations Blood Pressure SpO2   10/11/23 0944 10/11/23 0942 10/11/23 0942 10/11/23 0942 10/11/23 0942   98.1 °F (36.7 °C) 90 22 105/64 94 % on 3 L o2 nc      Temp Source Heart Rate Source Patient Position - Orthostatic VS BP Location FiO2 (%)   10/11/23 0944 10/11/23 0942 10/11/23 1030 10/11/23 1030 --   Oral Monitor Lying Left arm       Pain Score       10/11/23 0942       No Pain          Wt Readings from Last 1 Encounters:   10/12/23 46.3 kg (102 lb 1.2 oz)     10/11/23 44.5 kg (98 lb)   10/06/23 44.5 kg (98 lb)     Additional Vital Signs:   ate/Time Temp Pulse Resp BP MAP (mmHg) SpO2 Calculated FIO2 (%) - Nasal Cannula O2 Flow Rate (L/min) Nasal Cannula O2 Flow Rate (L/min) O2 Device O2 Interface Device   10/12/23 1354 -- -- -- -- -- 94 % -- -- -- -- Face mask   10/12/23 1323 -- -- -- -- -- 94 % -- -- -- -- --   10/12/23 0800 97.5 °F (36.4 °C) -- -- -- -- 94 % -- -- -- Nasal cannula --                   e/Time Temp Pulse Resp BP MAP (mmHg) SpO2 Calculated FIO2 (%) - Nasal Cannula O2 Flow Rate (L/min) Nasal Cannula O2 Flow Rate (L/min) O2 Device O2 Interface Device   10/12/23 0759 -- -- -- -- -- 94 % -- -- -- -- --   10/12/23 07:06:36 97.5 °F (36.4 °C) 76 18 110/71 84 95 % -- -- -- -- --   10/12/23 05:16:31 -- 88 -- 114/73 87 94 % -- -- -- -- --   10/12/23 0500 -- -- -- -- -- -- 32 -- 3 L/min Nasal cannula --   10/12/23 0215 -- -- -- -- -- -- -- -- -- BiPAP --   10/11/23 23:38:30 97.5 °F (36.4 °C) 79 15 103/63 76 94 % -- -- -- -- --   10/11/23 2200 -- -- -- -- -- 95 % -- -- -- -- Full face mask   10/11/23 2037 -- -- -- -- -- -- 32 -- 3 L/min Nasal cannula --   10/11/23 20:34:02 97.6 °F (36.4 °C) 101 20 117/62 80 91 % -- -- -- -- --   10/11/23 1730 -- 85 24 Abnormal  102/65 79 98 % 32 -- 3 L/min Nasal cannula --   10/11/23 1700 -- 86 24 Abnormal  113/65 85 97 % 32 -- 3 L/min Nasal cannula --   10/11/23 1530 -- 100 30 Abnormal  109/65 81 95 % 32 -- 3 L/min Nasal cannula --   10/11/23 1500 -- 89 32 Abnormal  108/66 79 93 % 32 -- 3 L/min Nasal cannula --   10/11/23 1430 -- 87 31 Abnormal  107/64 80 93 % -- -- -- BiPAP --   10/11/23 1328 -- -- -- -- -- 90 % -- -- -- -- --   10/11/23 1300 -- 86 26 Abnormal  103/61 77 90 % -- -- -- BiPAP --   10/11/23 1252 97.8 °F (36.6 °C) -- -- -- -- -- -- -- -- -- --   10/11/23 1200 -- 87 26 Abnormal  111/73 87 100 % -- -- -- BiPAP --   10/11/23 1139 -- -- -- -- -- -- -- -- -- -- Face mask   10/11/23 1100 -- 87 20 111/64 82 100 % 32 -- 3 L/min Nasal cannula --   10/11/23 1030 -- 88 20 113/69 86 95 % 32 -- 3 L/min Nasal cannula --   10/11/23 0958 -- -- -- -- -- -- -- 3 L/min -- Nasal cannula --   10/11/23 0947 -- -- -- -- -- 94 % -- -- -- -- --       Pertinent Labs/Diagnostic Test Results:    10/11 ECG- NSR. HR 87. XR chest 1 view portable   Final Result by Summer Farmer MD (10/11 1213)      Emphysema with no definite acute disease.                Workstation performed: [id number]               Results from last 7 days   Lab Units 10/12/23  0857 10/12/23  0427 10/11/23  1009   WBC Thousand/uL 2.75* 1.90* 6.40   HEMOGLOBIN g/dL 10.8* 10.4* 11.4*   HEMATOCRIT % 33.7* 33.0* 36.8   PLATELETS Thousands/uL 217 232 253   NEUTROS ABS Thousands/µL 2.34  --  4.64         Results from last 7 days   Lab Units 10/12/23  0427 10/11/23  1009   SODIUM mmol/L 128* 129*   POTASSIUM mmol/L 3.9 3.2*   CHLORIDE mmol/L 78* 74*   CO2 mmol/L >45* >45*   BUN mg/dL 14 9   CREATININE mg/dL 0.57* 0.62   EGFR ml/min/1.73sq m 107 103   CALCIUM mg/dL 9.2 9.6     Results from last 7 days   Lab Units 10/11/23  1009   AST U/L 31   ALT U/L 12   ALK PHOS U/L 62   TOTAL PROTEIN g/dL 7.1   ALBUMIN g/dL 4.3   TOTAL BILIRUBIN mg/dL 0.62     Results from last 7 days   Lab Units 10/12/23  0707 10/11/23  2119   POC GLUCOSE mg/dl 127 256*     Results from last 7 days   Lab Units 10/12/23  0427 10/11/23  1009   GLUCOSE RANDOM mg/dL 122 91              Results from last 7 days   Lab Units 10/12/23  0607 10/11/23  1247 10/11/23  1004   PH JHONATHAN  7.475* 7.348 7.299*   PCO2 JHONATHAN mm Hg 59.5* 97.7* 106.0*   PO2 JHONATHAN mm Hg 80.7* 52.2* 27.6*   HCO3 JHONATHAN mmol/L 42.8* 52.5* 50.9*   BASE EXC JHONATHAN mmol/L 16.6 22.1 19.2   O2 CONTENT JHONATHAN ml/dL 15.0 13.2 8.3   O2 HGB, VENOUS % 92.4* 80.4* 45.2*                   Results from last 7 days   Lab Units 10/11/23  1009   BNP pg/mL 666*                       ED Treatment:   Medication Administration from 10/11/2023 0921 to 10/11/2023 2019         Date/Time Order Dose Route Action     10/11/2023 1328 EDT ipratropium-albuterol (DUO-NEB) 0.5-2.5 mg/3 mL inhalation solution 3 mL 3 mL Nebulization Given     10/11/2023 1016 EDT ipratropium-albuterol (DUO-NEB) 0.5-2.5 mg/3 mL inhalation solution 3 mL 3 mL Nebulization Given     10/11/2023 1010 EDT methylPREDNISolone sodium succinate (Solu-MEDROL) injection 80 mg 80 mg Intravenous Given     10/11/2023 1209 EDT ipratropium-albuterol (DUO-NEB) 0.5-2.5 mg/3 mL inhalation solution 3 mL 3 mL Nebulization Given     10/11/2023 1137 EDT ipratropium-albuterol (DUO-NEB) 0.5-2.5 mg/3 mL inhalation solution 3 mL 3 mL Nebulization Given          Past Medical History:   Diagnosis Date    Acute metabolic encephalopathy 24/47/4443    Arthritis     Bladder mass     Cardiomyopathy (HCC)     Chest pain     COPD (chronic obstructive pulmonary disease) ([facility])     COVID-19 virus infection 02/23/2023    CPAP (continuous positive airway pressure) dependence     Emphysema of lung ([facility])     Hypoxia     nocturnal    Left bundle branch block     Multiple pulmonary nodules     last assessed: 10/12/16    Pneumonia     Sleep apnea, obstructive     Smoker     Weight loss 08/29/2019     Present on Admission:   Chronic HFrEF (heart failure with reduced ejection fraction)    COPD exacerbation   Prediabetes   Chronic hyponatremia      Admitting Diagnosis: SOB (shortness of breath) [R06.02]  COPD exacerbation ([facility]) [J44.1]  Hypercapnic respiratory failure ([facility]) [J96.92]  Age/Sex: 79 y.o. male  Admission Orders:  Scheduled Medications:  budesonide, 1 mg, Nebulization, Daily  cyanocobalamin, 1,000 mcg, Oral, Daily  formoterol, 20 mcg, Nebulization, Q12H  heparin (porcine), 5,000 Units, Subcutaneous, Q8H [address]  insulin lispro, 1-5 Units, Subcutaneous, 4x Daily (AC & HS)  ipratropium, 0.5 mg, Nebulization, TID  levalbuterol, 1.25 mg, Nebulization, TID  melatonin, 3 mg, Oral, HS  methylPREDNISolone sodium succinate, 40 mg, Intravenous, Q8H  pravastatin, 80 mg, Oral, Daily With Dinner  torsemide, 40 mg, Oral, Daily    potassium chloride (K-DUR,KLOR-CON) CR tablet 40 mEq  Dose: 40 mEq  Freq: Once Route: PO  Indications of Use: HYPOKALEMIA  Start: 10/11/23 1830 End: 10/11/23 2032   Continuous IV Infusions:     PRN Meds:       BI PAP at HS    Reg diet, FR 1200 ml   daily wt   I/O   Telemetry       Network Utilization Review Department  ATTENTION: Please call with any questions or concerns to [phone] and carefully listen to the prompts so that you are directed to the right person.  All voicemails are confidential.   For Discharge needs, contact Care Management DC Support Team at [phone] opt. 2  Send all requests for admission clinical reviews, approved or denied determinations and any other requests to dedicated fax number below belonging to the campus where the patient is receiving treatment.  List of dedicated fax numbers for the Facilities:  [place] DENIALS (Administrative/Medical Necessity) [phone]   DISCHARGE SUPPORT TEAM (NETWORK) [id number] [facility] (Maternity/NICU/Pediatrics) [phone]   [address] [place] [address] Drive [address] [facility] 1000 [facility] [phone]   [facility] [address] [place] [address] [address] [address] [facility] [address] [facility] [address] [phone]

## 2023-10-12 NOTE — PROGRESS NOTES
[address]  Progress Note  Name: Kobe Torres  MRN: [id number]  Unit/Bed#: S -22 I Date of Admission: 10/11/2023   Date of Service: 10/12/2023 I Hospital Day: 1    Assessment/Plan   * Acute respiratory failure with hypoxia and hypercapnia [facility])  Assessment & Plan  Patient came in with shortness of breath. Was lethargic since past 2 days. Referred to ED by his pulmonologist.  In ED VBG showed acidosis with hypoxia and hypercapnia  Patient use trilogy vent at night, in daytime use 3 L O2 at rest and 4 L O2 on exertion  Last PFT(2020): FEV1/FVC-30%, spirometry showed airflow obstruction  CT chest(7/31/23): Moderate patchy bilateral groundglass opacity and consolidation, new from December 2022, infectious/inflammatory. New 6 mm right upper lobe nodule. Per 2017 Fleischner Society guidelines, recommend follow-up with a chest CT in 6 months. Moderate emphysema. Etiology: COPD exacerbation vs component of heart failure  Patient was saturating well at 4 liter oxygen this morning but started to desatuarate in the 80s with 6L oxygen. Plan:  Monitor SPO2  COVID 19 testing   Patient will be placed back on BiPAP  BiPAP at night  Continue home O2 at daytime  Respiratory protocol  VBG at noon today     COPD exacerbation  Assessment & Plan  Lab Results   Component Value Date    SARSCOV2 Negative 07/19/2023    INFLUA Negative 07/19/2023    INFLUB Negative 07/19/2023    RSV Negative 07/19/2023   Smoking Status: Former  Patient use Pulmicort/Perforomist twice daily nebulization at home and Proventil as needed    Plan:  Xopenex/Atrovent 3 times daily  IV Solu-Medrol: 40 mg every 8h; taper as appropriate  3-4 L NC, Goal O2 sat 88-92%  CBC, BMP  Monitor respiratory status, Respiratory protocol, Airway clearance protocol, IS    Chronic hyponatremia  Assessment & Plan  Lab Results   Component Value Date    SODIUM 129 (L) 10/11/2023    SODIUM 136 08/30/2023   Patient has a chronic hyponatremia. Baseline 125-135  Monitor BMP daily  Fluid restriction of 1200 mL  1 g Nacl tablets BID    Chronic HFrEF (heart failure with reduced ejection fraction)   Assessment & Plan  Wt Readings from Last 3 Encounters:   10/11/23 44.5 kg (98 lb)   10/11/23 44.5 kg (98 lb)   10/06/23 44.5 kg (98 lb)     Lab Results   Component Value Date    LVEF 20 05/01/2023     (H) 10/11/2023     (H) 07/22/2023       Presents with increased shortness of breath  NYHA Class III., Stage C  Patient appears euvolemic. Bed scale weight 102 today    Plan:  GDMT: Diuretic: Torsemide 40 mg daily  CMP, magnesium tomorrow a.m; Goal Mg > 2 and K > 4; Replete prn  HOB > 30°, Daily standing weights, Measure I/O  Diet: Regular with fluid restriction of 1200 mL            Prediabetes  Assessment & Plan  Lab Results   Component Value Date    HGBA1C 6.5 (H) 08/30/2023       No results for input(s): "POCGLU" in the last 72 hours. Blood Sugar Average: Last 72 hrs:    Patient A1c elevated to 6.5 from 5.7 secondary to steroid  Since his last admission patient was discharged on Humalog but is not compliant with it. Hold humolog   Monitor BG daily  Sliding scale insulin  Hypoglycemia protocol                 VTE Pharmacologic Prophylaxis: VTE Score: 3 Moderate Risk (Score 3-4) - Pharmacological DVT Prophylaxis Ordered: heparin. Patient Centered Rounds: I performed bedside rounds with nursing staff today. Discussions with Specialists or Other Care Team Provider: Nutritional service     Education and Discussions with Family / Patient: Updated  (wife) via phone. Current Length of Stay: 1 day(s)  Current Patient Status: Inpatient   Discharge Plan: Anticipate discharge in 24-48 hrs to home. Code Status: Level 1 - Full Code    Subjective:   Overnight patient was placed on bipap. This morning he was wean off bipap and placed on 4L oxygent. Patient started to desaturate in the afternoon and required bipap.  He denied chest pain, headache, abdominal pain, fever and chill. Objective:     Vitals:   Temp (24hrs), Av.6 °F (36.4 °C), Min:97.5 °F (36.4 °C), Max:97.8 °F (36.6 °C)    Temp:  [97.5 °F (36.4 °C)-97.8 °F (36.6 °C)] 97.5 °F (36.4 °C)  HR:  [] 76  Resp:  [15-32] 18  BP: (102-117)/(61-73) 110/71  SpO2:  [90 %-98 %] 94 %  Body mass index is 18.67 kg/m². Input and Output Summary (last 24 hours): Intake/Output Summary (Last 24 hours) at 10/12/2023 1226  Last data filed at 10/11/2023 2043  Gross per 24 hour   Intake 120 ml   Output --   Net 120 ml       Physical Exam:   Physical Exam  Vitals and nursing note reviewed. Constitutional:       General: He is not in acute distress. Appearance: He is well-developed. He is ill-appearing. HENT:      Head: Normocephalic and atraumatic. Eyes:      Conjunctiva/sclera: Conjunctivae normal.   Cardiovascular:      Rate and Rhythm: Normal rate and regular rhythm. Heart sounds: No murmur heard. Pulmonary:      Effort: Pulmonary effort is normal. No respiratory distress. Breath sounds: Wheezing present. Abdominal:      General: Abdomen is flat. Bowel sounds are normal.      Palpations: Abdomen is soft. Tenderness: There is no abdominal tenderness. Musculoskeletal:         General: No swelling. Cervical back: Neck supple. Skin:     General: Skin is warm and dry. Capillary Refill: Capillary refill takes less than 2 seconds. Neurological:      Mental Status: He is alert and oriented to person, place, and time.    Psychiatric:         Mood and Affect: Mood normal.          Additional Data:     Labs:  Results from last 7 days   Lab Units 10/12/23  0857   WBC Thousand/uL 2.75*   HEMOGLOBIN g/dL 10.8*   HEMATOCRIT % 33.7*   PLATELETS Thousands/uL 217   NEUTROS PCT % 85*   LYMPHS PCT % 9*   MONOS PCT % 6   EOS PCT % 0     Results from last 7 days   Lab Units 10/12/23  0427 10/11/23  1009   SODIUM mmol/L 128* 129*   POTASSIUM mmol/L 3.9 3.2*   CHLORIDE mmol/L 78* 74*   CO2 mmol/L >45* >45*   BUN mg/dL 14 9   CREATININE mg/dL 0.57* 0.62   CALCIUM mg/dL 9.2 9.6   ALBUMIN g/dL  --  4.3   TOTAL BILIRUBIN mg/dL  --  0.62   ALK PHOS U/L  --  62   ALT U/L  --  12   AST U/L  --  31   GLUCOSE RANDOM mg/dL 122 91         Results from last 7 days   Lab Units 10/12/23  1047 10/12/23  0707 10/11/23  2119   POC GLUCOSE mg/dl 175* 127 256*               Lines/Drains:  Invasive Devices       Peripheral Intravenous Line  Duration             Peripheral IV 10/11/23 Distal;Left;Upper;Ventral (anterior) Arm 1 day    Peripheral IV 10/11/23 Proximal;Right;Ventral (anterior) Forearm <1 day              Drain  Duration             External Urinary Catheter 73 days                      Telemetry:  Telemetry Orders (From admission, onward)               24 Hour Telemetry Monitoring  Continuous x 24 Hours (Telem)        Question:  Reason for 24 Hour Telemetry  Answer:  Decompensated CHF- and any one of the following: continuous diuretic infusion or total diuretic dose >200 mg daily, associated electrolyte derangement (I.e. K < 3.0), ionotropic drip (continuous infusion), hx of ventricular arrhythmia, or new EF < 35%                     Telemetry Reviewed:  EF of 20%  Indication for Continued Telemetry Use: Acute respiratory failure on Bipap             Imaging: Reviewed radiology reports from this admission including: chest xray    Recent Cultures (last 7 days):         Last 24 Hours Medication List:   Current Facility-Administered Medications   Medication Dose Route Frequency Provider Last Rate    budesonide  1 mg Nebulization Daily Kayode Chester MD      cyanocobalamin  1,000 mcg Oral Daily Alli Olivares MD      formoterol  20 mcg Nebulization Q12H Alli Olivares MD      heparin (porcine)  5,000 Units Subcutaneous Q8H Courtney Howell MD      insulin lispro  1-5 Units Subcutaneous 4x Daily (AC & HS) Kayode Chester MD      ipratropium  0.5 mg Nebulization TID Alli Olivares MD levalbuterol  1.25 mg Nebulization TID Andrew Hall MD      melatonin  3 mg Oral HS Kayode Chester MD      methylPREDNISolone sodium succinate  40 mg Intravenous Alvaro Nava MD      pravastatin  80 mg Oral Daily With Franklyn Tan MD      sodium chloride  1 g Oral BID With Meals Bravo Kelly MD      torsemide  40 mg Oral Daily Andrew Hall MD          Today, Patient Was Seen By: Bravo Kelly MD    **Please Note: This note may have been constructed using a voice recognition system. **

## 2023-10-13 PROBLEM — E46 MALNUTRITION (HCC): Status: ACTIVE | Noted: 2023-06-10

## 2023-10-13 LAB
BASOPHILS # BLD AUTO: 0 THOUSANDS/ÂΜL (ref 0–0.1)
BASOPHILS NFR BLD AUTO: 0 % (ref 0–1)
BUN SERPL-MCNC: 25 MG/DL (ref 5–25)
CALCIUM SERPL-MCNC: 9.1 MG/DL (ref 8.4–10.2)
CHLORIDE SERPL-SCNC: 80 MMOL/L (ref 96–108)
CO2 SERPL-SCNC: >45 MMOL/L (ref 21–32)
CREAT SERPL-MCNC: 0.67 MG/DL (ref 0.6–1.3)
DME PARACHUTE DELIVERY DATE REQUESTED: NORMAL
DME PARACHUTE DELIVERY NOTE: NORMAL
DME PARACHUTE ITEM DESCRIPTION: NORMAL
DME PARACHUTE ORDER STATUS: NORMAL
DME PARACHUTE SUPPLIER NAME: NORMAL
DME PARACHUTE SUPPLIER PHONE: NORMAL
EOSINOPHIL # BLD AUTO: 0 THOUSAND/ÂΜL (ref 0–0.61)
EOSINOPHIL NFR BLD AUTO: 0 % (ref 0–6)
ERYTHROCYTE [DISTWIDTH] IN BLOOD BY AUTOMATED COUNT: 12.5 % (ref 11.6–15.1)
GFR SERPL CREATININE-BSD FRML MDRD: 100 ML/MIN/1.73SQ M
GLUCOSE SERPL-MCNC: 106 MG/DL (ref 65–140)
GLUCOSE SERPL-MCNC: 224 MG/DL (ref 65–140)
GLUCOSE SERPL-MCNC: 282 MG/DL (ref 65–140)
GLUCOSE SERPL-MCNC: 303 MG/DL (ref 65–140)
HCT VFR BLD AUTO: 32.6 % (ref 36.5–49.3)
HGB BLD-MCNC: 10.1 G/DL (ref 12–17)
IMM GRANULOCYTES # BLD AUTO: 0.03 THOUSAND/UL (ref 0–0.2)
IMM GRANULOCYTES NFR BLD AUTO: 1 % (ref 0–2)
LYMPHOCYTES # BLD AUTO: 0.55 THOUSANDS/ÂΜL (ref 0.6–4.47)
LYMPHOCYTES NFR BLD AUTO: 8 % (ref 14–44)
MCH RBC QN AUTO: 30.1 PG (ref 26.8–34.3)
MCHC RBC AUTO-ENTMCNC: 31 G/DL (ref 31.4–37.4)
MCV RBC AUTO: 97 FL (ref 82–98)
MONOCYTES # BLD AUTO: 1.03 THOUSAND/ÂΜL (ref 0.17–1.22)
MONOCYTES NFR BLD AUTO: 16 % (ref 4–12)
NEUTROPHILS # BLD AUTO: 5.01 THOUSANDS/ÂΜL (ref 1.85–7.62)
NEUTS SEG NFR BLD AUTO: 75 % (ref 43–75)
NRBC BLD AUTO-RTO: 0 /100 WBCS
PLATELET # BLD AUTO: 214 THOUSANDS/UL (ref 149–390)
PMV BLD AUTO: 9.6 FL (ref 8.9–12.7)
POTASSIUM SERPL-SCNC: 3.2 MMOL/L (ref 3.5–5.3)
RBC # BLD AUTO: 3.36 MILLION/UL (ref 3.88–5.62)
SODIUM SERPL-SCNC: 133 MMOL/L (ref 135–147)
WBC # BLD AUTO: 6.62 THOUSAND/UL (ref 4.31–10.16)

## 2023-10-13 PROCEDURE — 82948 REAGENT STRIP/BLOOD GLUCOSE: CPT

## 2023-10-13 PROCEDURE — 94761 N-INVAS EAR/PLS OXIMETRY MLT: CPT

## 2023-10-13 PROCEDURE — 99232 SBSQ HOSP IP/OBS MODERATE 35: CPT | Performed by: INTERNAL MEDICINE

## 2023-10-13 PROCEDURE — 94660 CPAP INITIATION&MGMT: CPT

## 2023-10-13 PROCEDURE — 94003 VENT MGMT INPAT SUBQ DAY: CPT

## 2023-10-13 PROCEDURE — 94640 AIRWAY INHALATION TREATMENT: CPT

## 2023-10-13 PROCEDURE — 94760 N-INVAS EAR/PLS OXIMETRY 1: CPT

## 2023-10-13 PROCEDURE — 80048 BASIC METABOLIC PNL TOTAL CA: CPT

## 2023-10-13 PROCEDURE — 94002 VENT MGMT INPAT INIT DAY: CPT

## 2023-10-13 PROCEDURE — 97167 OT EVAL HIGH COMPLEX 60 MIN: CPT

## 2023-10-13 PROCEDURE — 85025 COMPLETE CBC W/AUTO DIFF WBC: CPT

## 2023-10-13 RX ORDER — POTASSIUM CHLORIDE 14.9 MG/ML
20 INJECTION INTRAVENOUS
Status: COMPLETED | OUTPATIENT
Start: 2023-10-13 | End: 2023-10-13

## 2023-10-13 RX ADMIN — LEVALBUTEROL HYDROCHLORIDE 1.25 MG: 1.25 SOLUTION RESPIRATORY (INHALATION) at 19:30

## 2023-10-13 RX ADMIN — INSULIN LISPRO 3 UNITS: 100 INJECTION, SOLUTION INTRAVENOUS; SUBCUTANEOUS at 12:51

## 2023-10-13 RX ADMIN — HEPARIN SODIUM 5000 UNITS: 5000 INJECTION INTRAVENOUS; SUBCUTANEOUS at 21:55

## 2023-10-13 RX ADMIN — INSULIN LISPRO 1 UNITS: 100 INJECTION, SOLUTION INTRAVENOUS; SUBCUTANEOUS at 17:20

## 2023-10-13 RX ADMIN — LEVALBUTEROL HYDROCHLORIDE 1.25 MG: 1.25 SOLUTION RESPIRATORY (INHALATION) at 13:17

## 2023-10-13 RX ADMIN — HEPARIN SODIUM 5000 UNITS: 5000 INJECTION INTRAVENOUS; SUBCUTANEOUS at 15:43

## 2023-10-13 RX ADMIN — IPRATROPIUM BROMIDE 0.5 MG: 0.5 SOLUTION RESPIRATORY (INHALATION) at 13:17

## 2023-10-13 RX ADMIN — SODIUM CHLORIDE 1 G: 1 TABLET ORAL at 15:43

## 2023-10-13 RX ADMIN — PRAVASTATIN SODIUM 80 MG: 80 TABLET ORAL at 15:43

## 2023-10-13 RX ADMIN — Medication 1000 MCG: at 08:56

## 2023-10-13 RX ADMIN — POTASSIUM CHLORIDE 20 MEQ: 14.9 INJECTION, SOLUTION INTRAVENOUS at 15:43

## 2023-10-13 RX ADMIN — METHYLPREDNISOLONE SODIUM SUCCINATE 40 MG: 40 INJECTION, POWDER, FOR SOLUTION INTRAMUSCULAR; INTRAVENOUS at 05:20

## 2023-10-13 RX ADMIN — FORMOTEROL FUMARATE DIHYDRATE 20 MCG: 20 SOLUTION RESPIRATORY (INHALATION) at 07:04

## 2023-10-13 RX ADMIN — METHYLPREDNISOLONE SODIUM SUCCINATE 40 MG: 40 INJECTION, POWDER, FOR SOLUTION INTRAMUSCULAR; INTRAVENOUS at 21:58

## 2023-10-13 RX ADMIN — FORMOTEROL FUMARATE DIHYDRATE 20 MCG: 20 SOLUTION RESPIRATORY (INHALATION) at 19:30

## 2023-10-13 RX ADMIN — METHYLPREDNISOLONE SODIUM SUCCINATE 40 MG: 40 INJECTION, POWDER, FOR SOLUTION INTRAMUSCULAR; INTRAVENOUS at 12:54

## 2023-10-13 RX ADMIN — TORSEMIDE 40 MG: 20 TABLET ORAL at 08:56

## 2023-10-13 RX ADMIN — INSULIN LISPRO 2 UNITS: 100 INJECTION, SOLUTION INTRAVENOUS; SUBCUTANEOUS at 08:56

## 2023-10-13 RX ADMIN — SODIUM CHLORIDE 1 G: 1 TABLET ORAL at 08:56

## 2023-10-13 RX ADMIN — Medication 3 MG: at 21:55

## 2023-10-13 RX ADMIN — POTASSIUM CHLORIDE 20 MEQ: 14.9 INJECTION, SOLUTION INTRAVENOUS at 18:01

## 2023-10-13 RX ADMIN — HEPARIN SODIUM 5000 UNITS: 5000 INJECTION INTRAVENOUS; SUBCUTANEOUS at 05:20

## 2023-10-13 RX ADMIN — IPRATROPIUM BROMIDE 0.5 MG: 0.5 SOLUTION RESPIRATORY (INHALATION) at 19:30

## 2023-10-13 RX ADMIN — LEVALBUTEROL HYDROCHLORIDE 1.25 MG: 1.25 SOLUTION RESPIRATORY (INHALATION) at 07:04

## 2023-10-13 RX ADMIN — BUDESONIDE 1 MG: 0.5 INHALANT ORAL at 07:04

## 2023-10-13 RX ADMIN — IPRATROPIUM BROMIDE 0.5 MG: 0.5 SOLUTION RESPIRATORY (INHALATION) at 07:04

## 2023-10-13 NOTE — MALNUTRITION/BMI
This medical record reflects one or more clinical indicators suggestive of malnutrition and/or morbid obesity. Malnutrition Findings:   Adult Malnutrition type: Acute illness (in the setting of chronic illness)  Adult Degree of Malnutrition: Other severe protein calorie malnutrition  Malnutrition Characteristics: Fat loss, Muscle loss, Weight loss                  360 Statement: Severe malnutrition rleated to catabolic illness, inadequate oral intake as evidenced by severe loss of fat and muscle, scapula, extremities, 15.1% weight decrease x 14 months, BMI 18.47. Currently treated with oral supplementation. BMI Findings:  Adult BMI Classifications: Underweight < 18.5        Body mass index is 18.47 kg/m². See Nutrition note dated 10/13 2023 for additional details. Completed nutrition assessment is viewable in the nutrition documentation.

## 2023-10-13 NOTE — ASSESSMENT & PLAN NOTE
Wt Readings from Last 3 Encounters:   10/13/23 45.8 kg (100 lb 15.5 oz)   10/11/23 44.5 kg (98 lb)   10/06/23 44.5 kg (98 lb)     Lab Results   Component Value Date    LVEF 20 05/01/2023     (H) 10/11/2023     (H) 07/22/2023       Presents with increased shortness of breath  NYHA Class III., Stage C  Patient appears euvolemic. Bed scale weight 102 today  Last Echo:  Left Ventricle: Left ventricular cavity size is mildly dilated. Wall thickness is normal. The left ventricular ejection fraction is 20%. Systolic function is severely reduced. There is severe global hypokinesis with regional variation.      Plan:  GDMT: Diuretic: Torsemide 40 mg daily  CMP, magnesium tomorrow a.m; Goal Mg > 2 and K > 4; Replete prn  HOB > 30°, Daily standing weights, Measure I/O  Diet: Regular with fluid restriction of 1200 mL  Telemetry

## 2023-10-13 NOTE — ASSESSMENT & PLAN NOTE
Patient came in with shortness of breath. Was lethargic since past 2 days. Referred to ED by his pulmonologist.  In ED VBG showed acidosis with hypoxia and hypercapnia  Patient use trilogy vent at night, in daytime use 3 L O2 at rest and 4 L O2 on exertion  Last PFT(2020): FEV1/FVC-30%, spirometry showed airflow obstruction  CT chest(7/31/23): Moderate patchy bilateral groundglass opacity and consolidation, new from December 2022, infectious/inflammatory. New 6 mm right upper lobe nodule. Per 2017 Fleischner Society guidelines, recommend follow-up with a chest CT in 6 months. Moderate emphysema.   Etiology: COPD exacerbation vs component of heart failure  COVID 19 negative     Plan:  Home O2 evaluation  Monitor SPO2  BiPAP at night  Continue home O2 at daytime  Respiratory protocol

## 2023-10-13 NOTE — DISCHARGE SUMMARY
[facility]  Discharge- Андрей Dang. 1955, 79 y.o. male MRN: [id number]  Unit/Bed#: S -82 Encounter: [id number]  Primary Care Provider: Karthik Aguila DO   Date and time admitted to hospital: 10/11/2023  9:35 AM    * Acute respiratory failure with hypoxia and hypercapnia [facility])  Assessment & Plan  Patient came in with shortness of breath. Was lethargic since past 2 days. Referred to ED by his pulmonologist.  In ED VBG showed acidosis with hypoxia and hypercapnia  Patient use trilogy vent at night, in daytime use 3 L O2 at rest and 4 L O2 on exertion  Last PFT(2020): FEV1/FVC-30%, spirometry showed airflow obstruction  CT chest(7/31/23): Moderate patchy bilateral groundglass opacity and consolidation, new from December 2022, infectious/inflammatory. New 6 mm right upper lobe nodule. Per 2017 Fleischner Society guidelines, recommend follow-up with a chest CT in 6 months. Moderate emphysema. Etiology: COPD exacerbation vs component of heart failure  COVID 19 negative     Plan:  Stable for discharge on 5 L supplementary oxygen    Malnutrition ([address])  Assessment & Plan  Malnutrition Findings:   Adult Malnutrition type: Acute illness (in the setting of chronic illness)  Adult Degree of Malnutrition: Other severe protein calorie malnutrition  Malnutrition Characteristics: Fat loss, Muscle loss, Weight loss   Could be COPD related with increase use of muscle for breathing. BMI Findings:  Adult BMI Classifications: Underweight < 18.5     Body mass index is 18.47 kg/m²        Plan:   Ensure protein shakes recommended with meals  . Chronic hyponatremia  Assessment & Plan  Lab Results   Component Value Date    SODIUM 135 10/14/2023    SODIUM 133 (L) 10/13/2023   Patient has a chronic hyponatremia. Baseline 125-135  Normalized.   Continue salt tablets 1 g twice daily with meals  BMP in 1 week recommended    COPD exacerbation  Assessment & Plan  Lab Results   Component Value Date    SARSCOV2 Negative 10/12/2023    INFLUA Negative 10/12/2023    INFLUB Negative 10/12/2023    RSV Negative 10/12/2023   Smoking Status: Former  Patient use Pulmicort/Perforomist twice daily nebulization at home and Proventil as needed    Plan:  -Stable for discharge today  Supplementary oxygen 5 L  Continue home scheduled nebulizer treatments and inhalers  Will discharge on prednisone taper  Patient encouraged to stay up-to-date on pneumococcal, COVID and flu vaccinations. Also encouraged to avoid secondhand smoke. Medical Problems       Resolved Problems  Date Reviewed: 10/14/2023   None       Discharging Resident: Andrew Martinez MD  Discharging Attending: Jasson Walsh MD  PCP: Karthik Aguila DO  Admission Date:   Admission Orders (From admission, onward)       Ordered        10/11/23 1559  INPATIENT ADMISSION  Once                          Discharge Date: 10/14/23    Consultations During Hospital Stay:  None    Procedures Performed:   None    Significant Findings / Test Results:   VBG results showing elevated CO2 of 106     Incidental Findings:   None   Test Results Pending at Discharge (will require follow up): None     Outpatient Tests Requested:  None    Complications:  None    Reason for Admission: None    Hospital Course:   Андрей Godwin is a 79 y.o. male patient with past medical history of COPD, heart failure with reduced ejection fraction, hyperlipidemia, prediabetes, prostate cancer. Stroke 2021 who originally presented to the hospital on 10/11/2023 due to shortness of breath. Patient  WBC was WNL,  stable hemoglobin, elevated BNP and hyponatremia of 129 with hypokalemia of 3.2. VBG at that time  showed pH of 7.299, CO2 to of 106, bicarb 50.9. Critical care was consulted due to patient respiratory failure with hypoxia and hypercapnia which progressively improved with BiPAP. Patient was weaned off BiPAP and placed on nasal canula after after multiple nebulizer. Patient breathing improved and he was weaned off BiPAP but was later placed back on BiPAP after he desaturated with nasal cannula. It was suspected that patient acute respiratory failure with hypoxia and hypercapnia was secondary to COPD exacerbation in the setting of severe heart failure. Patient was started on Solu-Medrol 40 mg IV every 8-hour, nebulizers, and continue his home medication of torsemide 40 mg p.o. daily. Respiratory therapy was following patient as needed for BiPAP and breathing treatments. Patient was then placed back on BiPAP at night but eventually mid flow during the day due to increased work of breathing. After patient on returning to his baseline oxygen, he was discharged on steroid taper at 40 mg with decrease of 10 every 3 days. Patient will need to follow-up with his pulmonologist and primary doctor in 1 week. Please see above list of diagnoses and related plan for additional information. Condition at Discharge: stable    Discharge Day Visit / Exam:   Subjective:  No acute event overnight. This morning, respiratory therapist removed BiPAP, and placed patient on nasal cannula. Patient started to desaturate on nasal cannula and was placed on mid flow. Patient denies any chest pain, headache, abdominal pain, fever and chills. Vitals: Blood Pressure: 108/70 (10/14/23 5048)  Pulse: 82 (10/14/23 0633)  Temperature: 97.5 °F (36.4 °C) (10/14/23 0633)  Temp Source: Axillary (10/12/23 0800)  Respirations: 20 (10/14/23 4473)  Height: 5' 2" (157.5 cm) (10/11/23 0942)  Weight - Scale: 46.2 kg (101 lb 13.6 oz) (10/14/23 0600)  SpO2: 93 % (10/14/23 1408)  Exam:   Physical Exam  Vitals and nursing note reviewed. Constitutional:       General: He is not in acute distress. Appearance: He is well-developed. HENT:      Head: Normocephalic and atraumatic.       Mouth/Throat:      Mouth: Mucous membranes are moist.   Eyes:      Conjunctiva/sclera: Conjunctivae normal.   Cardiovascular: Rate and Rhythm: Normal rate and regular rhythm. Heart sounds: No murmur heard. Pulmonary:      Effort: Pulmonary effort is normal. No respiratory distress. Breath sounds: Wheezing and rhonchi present. Abdominal:      Palpations: Abdomen is soft. Tenderness: There is no abdominal tenderness. Musculoskeletal:         General: No swelling. Cervical back: Neck supple. Skin:     General: Skin is warm and dry. Capillary Refill: Capillary refill takes less than 2 seconds. Neurological:      Mental Status: He is alert and oriented to person, place, and time. Psychiatric:         Mood and Affect: Mood normal.          Discussion with Family: Updated  (wife) via phone. Discharge instructions/Information to patient and family:   See after visit summary for information provided to patient and family. Provisions for Follow-Up Care:  See after visit summary for information related to follow-up care and any pertinent home health orders. Disposition:   Home    Planned Readmission: None    Discharge Medications:  See after visit summary for reconciled discharge medications provided to patient and/or family.       **Please Note: This note may have been constructed using a voice recognition system**

## 2023-10-13 NOTE — ASSESSMENT & PLAN NOTE
Malnutrition Findings:   Adult Malnutrition type: Acute illness (in the setting of chronic illness)  Adult Degree of Malnutrition: Other severe protein calorie malnutrition  Malnutrition Characteristics: Fat loss, Muscle loss, Weight loss   Could be COPD related with increase use of muscle for breathing. BMI Findings:  Adult BMI Classifications: Underweight < 18.5     Body mass index is 18.47 kg/m²        Plan:   Chocolate Ensure with meals    .

## 2023-10-13 NOTE — ASSESSMENT & PLAN NOTE
Lab Results   Component Value Date    SODIUM 133 (L) 10/13/2023    SODIUM 128 (L) 10/12/2023   Patient has a chronic hyponatremia.   Baseline 125-135  Monitor BMP daily  Fluid restriction of 1200 mL  1 g Nacl tablets

## 2023-10-13 NOTE — ASSESSMENT & PLAN NOTE
Lab Results   Component Value Date    SARSCOV2 Negative 10/12/2023    INFLUA Negative 10/12/2023    INFLUB Negative 10/12/2023    RSV Negative 10/12/2023   Smoking Status: Former  Patient use Pulmicort/Perforomist twice daily nebulization at home and Proventil as needed    Plan:  Xopenex/Atrovent 3 times daily  IV Solu-Medrol: 40 mg every 8h; taper as appropriate  3-4 L NC, Goal O2 sat 88-92%  CBC, BMP  Monitor respiratory status, Respiratory protocol, Airway clearance protocol, IS

## 2023-10-13 NOTE — ASSESSMENT & PLAN NOTE
Lab Results   Component Value Date    HGBA1C 6.5 (H) 08/30/2023       Recent Labs     10/12/23  1621 10/12/23  2045 10/13/23  0810 10/13/23  1033   POCGLU 184* 125 282* 303*       Blood Sugar Average: Last 72 hrs:  (P) [id number]  Patient A1c elevated to 6.5 from 5.7 secondary to steroid  Since his last admission patient was discharged on Humalog but is not compliant with it.   Hold humolog   Monitor BG daily  Sliding scale insulin  Hypoglycemia protocol

## 2023-10-13 NOTE — PROGRESS NOTES
[facility]  Progress Note  Name: Meir Zarate  MRN: [id number]  Unit/Bed#: S -00 I Date of Admission: 10/11/2023   Date of Service: 10/13/2023 I Hospital Day: 2    Assessment/Plan   * Acute respiratory failure with hypoxia and hypercapnia [facility])  Assessment & Plan  Patient came in with shortness of breath. Was lethargic since past 2 days. Referred to ED by his pulmonologist.  In ED VBG showed acidosis with hypoxia and hypercapnia  Patient use trilogy vent at night, in daytime use 3 L O2 at rest and 4 L O2 on exertion  Last PFT(2020): FEV1/FVC-30%, spirometry showed airflow obstruction  CT chest(7/31/23): Moderate patchy bilateral groundglass opacity and consolidation, new from December 2022, infectious/inflammatory. New 6 mm right upper lobe nodule. Per 2017 Fleischner Society guidelines, recommend follow-up with a chest CT in 6 months. Moderate emphysema.   Etiology: COPD exacerbation vs component of heart failure  COVID 19 negative     Plan:  Home O2 evaluation  Monitor SPO2  BiPAP at night  Continue home O2 at daytime  Respiratory protocol    COPD exacerbation  Assessment & Plan  Lab Results   Component Value Date    SARSCOV2 Negative 10/12/2023    INFLUA Negative 10/12/2023    INFLUB Negative 10/12/2023    RSV Negative 10/12/2023   Smoking Status: Former  Patient use Pulmicort/Perforomist twice daily nebulization at home and Proventil as needed    Plan:  Xopenex/Atrovent 3 times daily  IV Solu-Medrol: 40 mg every 8h; taper as appropriate  3-4 L NC, Goal O2 sat 88-92%  CBC, BMP  Monitor respiratory status, Respiratory protocol, Airway clearance protocol, IS    Malnutrition (HCC)  Assessment & Plan  Malnutrition Findings:   Adult Malnutrition type: Acute illness (in the setting of chronic illness)  Adult Degree of Malnutrition: Other severe protein calorie malnutrition  Malnutrition Characteristics: Fat loss, Muscle loss, Weight loss   Could be COPD related with increase use of muscle for breathing. BMI Findings:  Adult BMI Classifications: Underweight < 18.5     Body mass index is 18.47 kg/m²        Plan:   Chocolate Ensure with meals    . Chronic hyponatremia  Assessment & Plan  Lab Results   Component Value Date    SODIUM 133 (L) 10/13/2023    SODIUM 128 (L) 10/12/2023   Patient has a chronic hyponatremia. Baseline 125-135  Monitor BMP daily  Fluid restriction of 1200 mL  1 g Nacl tablets    Chronic HFrEF (heart failure with reduced ejection fraction)   Assessment & Plan  Wt Readings from Last 3 Encounters:   10/13/23 45.8 kg (100 lb 15.5 oz)   10/11/23 44.5 kg (98 lb)   10/06/23 44.5 kg (98 lb)     Lab Results   Component Value Date    LVEF 20 05/01/2023     (H) 10/11/2023     (H) 07/22/2023       Presents with increased shortness of breath  NYHA Class III., Stage C  Patient appears euvolemic. Bed scale weight 102 today  Last Echo:  Left Ventricle: Left ventricular cavity size is mildly dilated. Wall thickness is normal. The left ventricular ejection fraction is 20%. Systolic function is severely reduced. There is severe global hypokinesis with regional variation. Plan:  GDMT: Diuretic: Torsemide 40 mg daily  CMP, magnesium tomorrow a.m; Goal Mg > 2 and K > 4; Replete prn  HOB > 30°, Daily standing weights, Measure I/O  Diet: Regular with fluid restriction of 1200 mL  Telemetry             Prediabetes  Assessment & Plan  Lab Results   Component Value Date    HGBA1C 6.5 (H) 08/30/2023       Recent Labs     10/12/23  1621 10/12/23  2045 10/13/23  0810 10/13/23  1033   POCGLU 184* 125 282* 303*       Blood Sugar Average: Last 72 hrs:  (P) [id number]  Patient A1c elevated to 6.5 from 5.7 secondary to steroid  Since his last admission patient was discharged on Humalog but is not compliant with it.   Hold humolog   Monitor BG daily  Sliding scale insulin  Hypoglycemia protocol                   VTE Pharmacologic Prophylaxis: VTE Score: 3 Moderate Risk (Score 3-4) - Pharmacological DVT Prophylaxis Ordered: heparin. Patient Centered Rounds: I performed bedside rounds with nursing staff today. Discussions with Specialists or Other Care Team Provider: Respiratory therapy    Education and Discussions with Family / Patient: Updated  (wife) via phone. Current Length of Stay: 2 day(s)  Current Patient Status: Inpatient   Discharge Plan: Anticipate discharge tomorrow to home. Code Status: Level 1 - Full Code    Subjective:   No acute event overnight. This morning, respiratory therapist removed BiPAP, and placed patient on nasal cannula. Patient started to desaturate on nasal cannula and was placed on mid flow. Patient denies any chest pain, headache, abdominal pain, fever and chills. Objective:     Vitals:   Temp (24hrs), Av.9 °F (36.6 °C), Min:97.5 °F (36.4 °C), Max:98.3 °F (36.8 °C)    Temp:  [97.5 °F (36.4 °C)-98.3 °F (36.8 °C)] 98.3 °F (36.8 °C)  HR:  [] 95  Resp:  [13-21] 13  BP: (101-113)/(56-79) 101/56  SpO2:  [88 %-100 %] 93 %  Body mass index is 18.47 kg/m². Input and Output Summary (last 24 hours): Intake/Output Summary (Last 24 hours) at 10/13/2023 1541  Last data filed at 10/13/2023 0540  Gross per 24 hour   Intake --   Output 700 ml   Net -700 ml       Physical Exam:   Physical Exam  Vitals and nursing note reviewed. Constitutional:       General: He is not in acute distress. Appearance: He is well-developed. HENT:      Head: Normocephalic and atraumatic. Mouth/Throat:      Mouth: Mucous membranes are moist.   Eyes:      Conjunctiva/sclera: Conjunctivae normal.   Cardiovascular:      Rate and Rhythm: Normal rate and regular rhythm. Heart sounds: No murmur heard. Pulmonary:      Effort: Pulmonary effort is normal. No respiratory distress. Breath sounds: Wheezing and rhonchi present. Abdominal:      Palpations: Abdomen is soft. Tenderness: There is no abdominal tenderness. Musculoskeletal:         General: No swelling. Cervical back: Neck supple. Skin:     General: Skin is warm and dry. Capillary Refill: Capillary refill takes less than 2 seconds. Neurological:      Mental Status: He is alert. Psychiatric:         Mood and Affect: Mood normal.          Additional Data:     Labs:  Results from last 7 days   Lab Units 10/13/23  0444   WBC Thousand/uL 6.62   HEMOGLOBIN g/dL 10.1*   HEMATOCRIT % 32.6*   PLATELETS Thousands/uL 214   NEUTROS PCT % 75   LYMPHS PCT % 8*   MONOS PCT % 16*   EOS PCT % 0     Results from last 7 days   Lab Units 10/13/23  0444 10/12/23  0427 10/11/23  1009   SODIUM mmol/L 133*   < > 129*   POTASSIUM mmol/L 3.2*   < > 3.2*   CHLORIDE mmol/L 80*   < > 74*   CO2 mmol/L >45*   < > >45*   BUN mg/dL 25   < > 9   CREATININE mg/dL 0.67   < > 0.62   CALCIUM mg/dL 9.1   < > 9.6   ALBUMIN g/dL  --   --  4.3   TOTAL BILIRUBIN mg/dL  --   --  0.62   ALK PHOS U/L  --   --  62   ALT U/L  --   --  12   AST U/L  --   --  31   GLUCOSE RANDOM mg/dL 106   < > 91    < > = values in this interval not displayed.          Results from last 7 days   Lab Units 10/13/23  1033 10/13/23  0810 10/12/23  2045 10/12/23  1621 10/12/23  1047 10/12/23  0707 10/11/23  2119   POC GLUCOSE mg/dl 303* 282* 125 184* 175* 127 256*               Lines/Drains:  Invasive Devices       Peripheral Intravenous Line  Duration             Peripheral IV 10/13/23 Right;Ventral (anterior) Forearm <1 day              Drain  Duration             External Urinary Catheter 74 days                          Imaging: Reviewed radiology reports from this admission including: chest xray    Recent Cultures (last 7 days):         Last 24 Hours Medication List:   Current Facility-Administered Medications   Medication Dose Route Frequency Provider Last Rate    budesonide  1 mg Nebulization Daily Kayode Chester MD      cyanocobalamin  1,000 mcg Oral Daily Kayode Chester MD      formoterol  20 mcg Nebulization Q12H Bob Bruno MD      heparin (porcine)  5,000 Units Subcutaneous Q8H Niki Tellez MD      insulin lispro  1-5 Units Subcutaneous 4x Daily (AC & HS) Bob Bruno MD      ipratropium  0.5 mg Nebulization TID Bob Bruno MD      levalbuterol  1.25 mg Nebulization TID Bob Bruno MD      melatonin  3 mg Oral HS Bob Bruno MD      methylPREDNISolone sodium succinate  40 mg Intravenous Rubi Guan MD      potassium chloride  20 mEq Intravenous Q2H Tere Choudhury MD      pravastatin  80 mg Oral Daily With Clayton Polanco MD      sodium chloride  1 g Oral BID With Meals Tere Choudhury MD      torsemide  40 mg Oral Daily Bob Bruno MD          Today, Patient Was Seen By: Tere Choudhury MD    **Please Note: This note may have been constructed using a voice recognition system. **

## 2023-10-13 NOTE — RESPIRATORY THERAPY NOTE
Home Oxygen Qualifying Test     Patient name: Gal Sky        : 1955   Date of Test:  2023  Diagnosis:    Home Oxygen Test:    **Medicare Guidelines require item(s) 1-5 on all ambulatory patients or 1 and 2 on non-ambulatory patients. 1. Baseline SPO2 on Room Air at rest 83%   If <= 88% on Room Air add O2 via NC to obtain SpO2 >=88%. If LPM needed, document LPM 5 needed to reach =>88%    SPO2 during exertion on Room Air 83  %  During exertion monitor SPO2. If SPO2 increases >=89%, do not add supplemental oxygen    SPO2 on Oxygen at Rest 97% at  10LPM    SPO2 during exertion on Oxygen 90 % at  10LPM    Test performed during exertion activity. [x]  Supplemental Home Oxygen is indicated. []  Client does not qualify for home oxygen.     Respiratory Additional Notes-pt qualifies for home 02 24hrs/day via oxyimizer  At liter flow of 8lpm    Arun Burrows

## 2023-10-13 NOTE — OCCUPATIONAL THERAPY NOTE
Occupational Therapy Evaluation     Patient Name: Marly France. Today's Date: 10/13/2023  Problem List  Principal Problem:    Acute respiratory failure with hypoxia and hypercapnia (HCC)  Active Problems:    COPD exacerbation    Prediabetes    Chronic HFrEF (heart failure with reduced ejection fraction)     Chronic hyponatremia    Malnutrition ([address])    Past Medical History  Past Medical History:   Diagnosis Date    Acute metabolic encephalopathy 52/18/8765    Arthritis     Bladder mass     Cardiomyopathy (HCC)     Chest pain     COPD (chronic obstructive pulmonary disease) ([address])     COVID-19 virus infection 02/23/2023    CPAP (continuous positive airway pressure) dependence     Emphysema of lung (HCC)     Hypoxia     nocturnal    Left bundle branch block     Multiple pulmonary nodules     last assessed: 10/12/16    Pneumonia     Sleep apnea, obstructive     Smoker     Weight loss 08/29/2019     Past Surgical History  Past Surgical History:   Procedure Laterality Date    COLONOSCOPY      CYSTOSCOPY      CYSTOSCOPY  02/17/2021    WI BLADDER INSTILLATION ANTICARCINOGENIC AGENT N/A 1/3/2020    Procedure: INSTILLATION MITOMYCIN;  Surgeon: Israel Martínez MD;  [facility];  Service: Urology    WI CYSTO W/REMOVAL OF LESIONS SMALL N/A 1/3/2020    Procedure: TRANSURETHRAL RESECTION OF BLADDER TUMOR (TURBT);   Surgeon: Israel Martínez MD;  [facility];  Service: Urology    WI CYSTOURETHROSCOPY WITH BIOPSY N/A 4/13/2021    Procedure: Ferdinand Roles;  Surgeon: Israel Martínez MD;  [facility];  Service: Urology    WI TRURL ELECTROSURG [place] COMPLETE N/A 4/13/2021    Procedure: TRANSURETHRAL RESECTION OF PROSTATE (TURP) BLADDER BIOPSY, FULGURATION;  Surgeon: Israel Martínez MD;  [facility];  Service: Urology         10/13/23 1348   OT Last Visit   OT Visit Date 10/13/23   Note Type   Note type Evaluation   Pain Assessment   Pain Assessment Tool 0-10 Restrictions/Precautions   Weight Bearing Precautions Per Order No   Other Precautions Chair Alarm; Bed Alarm; Fall Risk;O2  (4 L 02 VIA NC)   Home Living   Type of Home House  [facility])   Home Layout One level;Performs ADLs on one level;Stairs to enter with rails  (3 CARLO and landing)   Bathroom Shower/Tub Tub/shower unit   [employer] Grab bars in shower;Grab bars around toilet; Shower chair   Bathroom Accessibility Accessible   Home Equipment Walker;Cane  (mostly use the walker)   Prior Function   Level of [place] Independent with functional mobility; Independent with IADLS; Independent with ADLs   Lives With Spouse  (wife)   Zaida Galaviz Help From Family   IADLs Family/Friend/Other provides transportation; Family/Friend/Other provides medication management; Family/Friend/Other provides meals   Falls in the last 6 months 0   Vocational Retired  (demolition)   Lifestyle   Autonomy pta pt was (I) w ADLs and (a) for IADLs, (-) , lives with spouse, uses O2 at home, ranch level home   Reciprocal Relationships spouse   Service to Others retired   Intrinsic Gratification unable to assess at this time   General   Additional Pertinent History CHF and hypercapnia, prediabetes, GERD, osteoarthritis, mood disorder, COPD, metabolic encephalopathy   Family/Caregiver Present No   Additional General Comments Pt was identified by name and    ADL   Where Assessed Edge of bed   Eating Assistance 6  Modified independent   Grooming Assistance 5  Supervision/Setup   UB Bathing Assistance 5  Supervision/Setup   LB Bathing Assistance 5  Supervision/Setup   UB Dressing Assistance 5  Supervision/Setup   LB Dressing Assistance 5  Supervision/Setup   LB Dressing Deficit Don/doff R sock; Don/doff L sock; Thread RLE into underwear; Thread LLE into underwear;Pull up over hips   Toileting Assistance  5  Supervision/Setup   Additional Comments Did not observe eating, grooming, UB bathing, LB bathing, UB dressing, and toileting at time of evaluation, with use of clinical reasoning, pt's performance throughout evaluation indicates that pt may be able to perform these tasks at the levels listed above   Bed Mobility   Supine to Sit 5  Supervision   Additional items HOB elevated; Increased time required;LE management   Sit to Supine Unable to assess   Additional Comments At end of IE pt was OOB in recliner chair with alarm activated   Transfers   Sit to Stand 5  Supervision   Additional items Increased time required   Stand to Sit 5  Supervision  (benefits from VCs for trunk control)   Additional items Increased time required   Functional Mobility   Functional Mobility 5  Supervision   Additional Comments x1, short household distance d/t drop in 02 to 82%, MD was present during fnxl mobility and wanted to see how pt's 02 was   Additional items Rolling walker   Balance   Static Sitting Fair +   Dynamic Sitting Fair   Static Standing Fair   Dynamic Standing Fair   Ambulatory Fair   Activity Tolerance   Activity Tolerance Patient limited by fatigue  (limited by 02)   Medical Staff Made Aware Spoke to Jeffrey Duncan PT   Nurse [address] aware   RUE Assessment   RUE Assessment WFL   LUE Assessment   LUE Assessment WFL   Vision-Basic Assessment   Current Vision Wears glasses only for reading   Vision - Complex Assessment   Acuity Able to read employee name badge without difficulty   Psychosocial   Psychosocial (WDL) WDL   Cognition   Overall Cognitive Status [facility]   Arousal/Participation Alert; Cooperative   Attention Attends with cues to redirect  [facility])   Orientation Level Oriented X4   Memory Within functional limits   Following Commands Follows one step commands without difficulty   Assessment   Limitation Decreased endurance;Decreased self-care trans;Decreased high-level ADLs; Decreased ADL status   Prognosis Good   Assessment Pt is a 79 y.o. male seen for OT evaluation s/p [facility]11[facility]23 due to weakness.  Diagnosed with Acute respiratory failure with hypoxia and hypercapnia ([address] Personal and env factors supporting pt at time of IE include (I) PLOF, supportive wife, attitude towards recovery, and accessible home environment. Personal and env factors inhibiting engagement in occupations include difficulty completing ADLs, difficulty completing IADLs, and medical status . Performance deficits that affect the pt’s occupational performance can be seen above. Due to pt's current functional limitations and medical complications pt is functioning below baseline. Pt would benefit from continued skilled OT treatment in order to maximize safety, independence and overall performance with ADLs, IADLs, functional mobility, and functional transfers in order to achieve highest level of function. Goals   Patient Goals to go home   LTG Time Frame 10-14   Long Term Goal see below   Plan   Treatment Interventions ADL retraining;Functional transfer training; Endurance training;Equipment evaluation/education; Compensatory technique education; Activityengagement; Energy conservation   Goal Expiration Date 10/27/23   OT Treatment Day 0   OT Frequency 2-3x/wk   Discharge Recommendation   OT Discharge Recommendation Home with home health rehabilitation  (vs. no needs pending progress)   Additional Comments  The patient's raw score on the AM-PAC Daily Activity Inpatient Short Form is 19. A raw score of greater than or equal to 19 suggests the patient may benefit from discharge to home. Please refer to the recommendation of the Occupational Therapist for safe discharge planning.    AM-PAC Daily Activity Inpatient   Lower Body Dressing 3   Bathing 3   Toileting 3   Upper Body Dressing 3   Grooming 3   Eating 4   Daily Activity Raw Score 19   Daily Activity Standardized Score (Calc for Raw Score >=11) 40.22   AM-PAC Applied Cognition Inpatient   Following a Speech/Presentation 3   Understanding Ordinary Conversation 4   Taking Medications 3   Remembering Where Things Are Placed or Put Away 3   Remembering List of 4-5 Errands 3   Taking Care of Complicated Tasks 3   Applied Cognition Raw Score 19   Applied Cognition Standardized Score 39.77   End of Consult   Education Provided Yes   Patient Position at End of Consult Bedside chair; All needs within reach;Bed/Chair alarm activated   Nurse Communication Nurse aware of consult   End of Consult Comments Spoke w Charles Jordan PT and MD was present during IE     GOALS    Pt will improve activity tolerance to G for min 30 min txment sessions for increase engagement in functional tasks    Pt will complete bed mobility at a Mod I level w/ G balance/safety demonstrated to decrease caregiver assistance required     Pt will complete UB dressing/self care w/ mod I using adaptive device and DME as needed     Pt will complete LB dressing/self care w/ mod I using adaptive device and DME as needed    Pt will complete toileting w/ mod I w/ G hygiene/thoroughness using DME as needed    Pt will improve functional transfers to Mod I on/off all surfaces using DME as needed w/ G balance/safety     Pt will improve functional mobility during ADL/IADL/leisure tasks to Mod I using DME as needed w/ G balance/safety     Pt will demonstrate G carryover of pt/caregiver education and training as appropriate w/o cues w/ good tolerance to increase safety during functional tasks    Pt will demonstrate 100% carryover of energy conservation techniques t/o functional I/ADL/leisure tasks w/o cues s/p skilled education to increase endurance during functional tasks    Pt will be able to state 6/6 CHF management techniques and demonstrate ability to weigh oneself at a Mod I level after education from therapist to increase self management of CHF in home environment and reduce risk for readmission          The patient's raw score on the -PAC Daily Activity Inpatient Short Form is 19.  A raw score of greater than or equal to 19 suggests the patient may benefit from discharge to home. Please refer to the recommendation of the Occupational Therapist for safe discharge planning.     Nicole Bhardwaj, OTR/L

## 2023-10-13 NOTE — CASE MANAGEMENT
Case Management Discharge Planning Note    Patient name Eri Miranda.   Location S /S -01 MRN [id number]  : 1955 Date 10/13/2023       Current Admission Date: 10/11/2023  Current Admission Diagnosis:Acute respiratory failure with hypoxia and hypercapnia [facility])   Patient Active Problem List    Diagnosis Date Noted    Acute respiratory failure with hypoxia and hypercapnia ([address]) 10/11/2023    Lung nodule 2023    Elevated troponin 2023    History of bladder cancer 2023    Pulmonary cachexia due to COPD [facility])     Chronic hypercapnia/KEVIN     Metabolic encephalopathy 9368    End-stage COPD with acute exacerbation ([address]) 2023    Palliative care patient 2023    Alkalosis 2023    Malnutrition ([address]) 06/10/2023    Hyperlipidemia 2023    COPD exacerbation ([address]) 2023    Steroid-induced hyperglycemia 2023    Physical deconditioning 2023    Chronic anemia 2023    SIRS (systemic inflammatory response syndrome) 2023    Chronic hyponatremia 2023    Chronic HFrEF (heart failure with reduced ejection fraction)  2022    Protein-calorie malnutrition ([address]) 2022    Acute on chronic respiratory failure with hypoxia and hypercapnia  2022    Pulmonary nodules/lesions, multiple 2022    Prostate cancer ([address]) 2021    BPH with obstruction/lower urinary tract symptoms 2021    Goals of care, counseling/discussion 2021    Chronic respiratory failure with hypoxia and hypercapnia ([address]) 2020    Macrocytosis without anemia 2019    Other insomnia 2019    GERD (gastroesophageal reflux disease) 2017    Nonobstructive atherosclerosis of coronary artery 2016    Mood disorder ([address]) 2015    Prediabetes 2015    Osteoporosis 10/14/2014    Vitamin D deficiency 10/14/2014    Nonischemic cardiomyopathy ([address]) 2014    Left bundle-branch block 2013 Osteoarthritis 08/03/2013    Obstructive sleep apnea 07/29/2013    COPD exacerbation 07/18/2012      LOS (days): 2  Geometric Mean LOS (GMLOS) (days): 3.60  Days to GMLOS:1.6     OBJECTIVE:  Risk of Unplanned Readmission Score: 38.54         Current admission status: Inpatient   Preferred Pharmacy:   [id number]  [address] [place] [id number]  Phone: [phone] Fax: [phone]    Primary Care Provider: Marly Swartz DO    Primary Insurance: BLUE CROSS  Secondary Insurance: MEDICARE    DISCHARGE DETAILS:  CM spoke with Primo Pathak from [facility] at . Patient current with [facility] services. CM reviewed respiratory evaluation with Primo Pathak. Patient requiring 10 LPM at rest and exertion. Patient also qualifies for oxymizer at liter flow 8LPM. Primo Pathak stated patient does have a 10 L concentrator at home. Primo Pathak requested CM send referral via parachute to [facility]. CM sent referral via parachute to [facility]. CM attached documentation and testing. CM requested oxymizer to be delivered at hospital prior to d/c. Order pending. [facility] stated they will f/u Monday with patient to discuss financial hardship options.

## 2023-10-13 NOTE — PLAN OF CARE
Problem: OCCUPATIONAL THERAPY ADULT  Goal: Performs self-care activities at highest level of function for planned discharge setting. See evaluation for individualized goals. Description: Treatment Interventions: ADL retraining, Functional transfer training, Endurance training, Equipment evaluation/education, Compensatory technique education, Activityengagement, Energy conservation          See flowsheet documentation for full assessment, interventions and recommendations. Note: Limitation: Decreased endurance, Decreased self-care trans, Decreased high-level ADLs, Decreased ADL status  Prognosis: Good  Assessment: Pt is a 79 y.o. male seen for OT evaluation s/p admission to [facility] on 10/11/2023 due to weakness. Diagnosed with Acute respiratory failure with hypoxia and hypercapnia ([address]). Personal and env factors supporting pt at time of IE include (I) PLOF, supportive wife, attitude towards recovery, and accessible home environment. Personal and env factors inhibiting engagement in occupations include difficulty completing ADLs, difficulty completing IADLs, and medical status . Performance deficits that affect the pt’s occupational performance can be seen above. Due to pt's current functional limitations and medical complications pt is functioning below baseline. Pt would benefit from continued skilled OT treatment in order to maximize safety, independence and overall performance with ADLs, IADLs, functional mobility, and functional transfers in order to achieve highest level of function.      OT Discharge Recommendation: Home with home health rehabilitation (vs. no needs pending progress)

## 2023-10-14 VITALS
DIASTOLIC BLOOD PRESSURE: 70 MMHG | BODY MASS INDEX: 18.74 KG/M2 | RESPIRATION RATE: 20 BRPM | HEIGHT: 62 IN | WEIGHT: 101.85 LBS | OXYGEN SATURATION: 93 % | TEMPERATURE: 97.5 F | SYSTOLIC BLOOD PRESSURE: 108 MMHG | HEART RATE: 82 BPM

## 2023-10-14 PROBLEM — E43 SEVERE PROTEIN-CALORIE MALNUTRITION (HCC): Status: ACTIVE | Noted: 2023-10-14

## 2023-10-14 LAB
BASOPHILS # BLD AUTO: 0 THOUSANDS/ÂΜL (ref 0–0.1)
BASOPHILS NFR BLD AUTO: 0 % (ref 0–1)
BUN SERPL-MCNC: 31 MG/DL (ref 5–25)
CALCIUM SERPL-MCNC: 9 MG/DL (ref 8.4–10.2)
CHLORIDE SERPL-SCNC: 82 MMOL/L (ref 96–108)
CO2 SERPL-SCNC: >45 MMOL/L (ref 21–32)
CREAT SERPL-MCNC: 0.67 MG/DL (ref 0.6–1.3)
EOSINOPHIL # BLD AUTO: 0 THOUSAND/ÂΜL (ref 0–0.61)
EOSINOPHIL NFR BLD AUTO: 0 % (ref 0–6)
ERYTHROCYTE [DISTWIDTH] IN BLOOD BY AUTOMATED COUNT: 12.5 % (ref 11.6–15.1)
GFR SERPL CREATININE-BSD FRML MDRD: 100 ML/MIN/1.73SQ M
GLUCOSE SERPL-MCNC: 132 MG/DL (ref 65–140)
GLUCOSE SERPL-MCNC: 142 MG/DL (ref 65–140)
GLUCOSE SERPL-MCNC: 144 MG/DL (ref 65–140)
GLUCOSE SERPL-MCNC: 248 MG/DL (ref 65–140)
HCT VFR BLD AUTO: 33.6 % (ref 36.5–49.3)
HGB BLD-MCNC: 10.2 G/DL (ref 12–17)
IMM GRANULOCYTES # BLD AUTO: 0.03 THOUSAND/UL (ref 0–0.2)
IMM GRANULOCYTES NFR BLD AUTO: 0 % (ref 0–2)
LYMPHOCYTES # BLD AUTO: 0.24 THOUSANDS/ÂΜL (ref 0.6–4.47)
LYMPHOCYTES NFR BLD AUTO: 3 % (ref 14–44)
MCH RBC QN AUTO: 29.9 PG (ref 26.8–34.3)
MCHC RBC AUTO-ENTMCNC: 30.4 G/DL (ref 31.4–37.4)
MCV RBC AUTO: 99 FL (ref 82–98)
MONOCYTES # BLD AUTO: 0.59 THOUSAND/ÂΜL (ref 0.17–1.22)
MONOCYTES NFR BLD AUTO: 8 % (ref 4–12)
NEUTROPHILS # BLD AUTO: 6.43 THOUSANDS/ÂΜL (ref 1.85–7.62)
NEUTS SEG NFR BLD AUTO: 89 % (ref 43–75)
NRBC BLD AUTO-RTO: 0 /100 WBCS
PLATELET # BLD AUTO: 207 THOUSANDS/UL (ref 149–390)
PMV BLD AUTO: 9.4 FL (ref 8.9–12.7)
POTASSIUM SERPL-SCNC: 4 MMOL/L (ref 3.5–5.3)
RBC # BLD AUTO: 3.41 MILLION/UL (ref 3.88–5.62)
SODIUM SERPL-SCNC: 135 MMOL/L (ref 135–147)
WBC # BLD AUTO: 7.29 THOUSAND/UL (ref 4.31–10.16)

## 2023-10-14 PROCEDURE — 99239 HOSP IP/OBS DSCHRG MGMT >30: CPT | Performed by: INTERNAL MEDICINE

## 2023-10-14 PROCEDURE — 80048 BASIC METABOLIC PNL TOTAL CA: CPT

## 2023-10-14 PROCEDURE — 94660 CPAP INITIATION&MGMT: CPT

## 2023-10-14 PROCEDURE — 85025 COMPLETE CBC W/AUTO DIFF WBC: CPT

## 2023-10-14 PROCEDURE — 82948 REAGENT STRIP/BLOOD GLUCOSE: CPT

## 2023-10-14 PROCEDURE — 94760 N-INVAS EAR/PLS OXIMETRY 1: CPT

## 2023-10-14 PROCEDURE — 99497 ADVNCD CARE PLAN 30 MIN: CPT | Performed by: INTERNAL MEDICINE

## 2023-10-14 PROCEDURE — 94640 AIRWAY INHALATION TREATMENT: CPT

## 2023-10-14 RX ORDER — PREDNISONE 5 MG/1
5 TABLET ORAL DAILY
Qty: 30 TABLET | Refills: 0 | Status: SHIPPED | OUTPATIENT
Start: 2023-10-14

## 2023-10-14 RX ORDER — METHYLPREDNISOLONE SODIUM SUCCINATE 40 MG/ML
40 INJECTION, POWDER, LYOPHILIZED, FOR SOLUTION INTRAMUSCULAR; INTRAVENOUS EVERY 12 HOURS SCHEDULED
Status: DISCONTINUED | OUTPATIENT
Start: 2023-10-14 | End: 2023-10-14 | Stop reason: HOSPADM

## 2023-10-14 RX ORDER — LEVALBUTEROL INHALATION SOLUTION 1.25 MG/3ML
1.25 SOLUTION RESPIRATORY (INHALATION) EVERY 6 HOURS PRN
Status: DISCONTINUED | OUTPATIENT
Start: 2023-10-14 | End: 2023-10-14 | Stop reason: HOSPADM

## 2023-10-14 RX ORDER — SODIUM CHLORIDE 1 G/1
1 TABLET ORAL 2 TIMES DAILY WITH MEALS
Qty: 60 TABLET | Refills: 0 | Status: SHIPPED | OUTPATIENT
Start: 2023-10-14

## 2023-10-14 RX ORDER — PREDNISONE 10 MG/1
TABLET ORAL
Qty: 36 TABLET | Refills: 0 | Status: SHIPPED | OUTPATIENT
Start: 2023-10-14 | End: 2023-10-26

## 2023-10-14 RX ADMIN — Medication 1000 MCG: at 09:46

## 2023-10-14 RX ADMIN — BUDESONIDE 1 MG: 0.5 INHALANT ORAL at 07:09

## 2023-10-14 RX ADMIN — IPRATROPIUM BROMIDE 0.5 MG: 0.5 SOLUTION RESPIRATORY (INHALATION) at 07:09

## 2023-10-14 RX ADMIN — INSULIN LISPRO 2 UNITS: 100 INJECTION, SOLUTION INTRAVENOUS; SUBCUTANEOUS at 00:07

## 2023-10-14 RX ADMIN — LEVALBUTEROL HYDROCHLORIDE 1.25 MG: 1.25 SOLUTION RESPIRATORY (INHALATION) at 07:09

## 2023-10-14 RX ADMIN — TORSEMIDE 40 MG: 20 TABLET ORAL at 09:46

## 2023-10-14 RX ADMIN — LEVALBUTEROL HYDROCHLORIDE 1.25 MG: 1.25 SOLUTION RESPIRATORY (INHALATION) at 12:23

## 2023-10-14 RX ADMIN — METHYLPREDNISOLONE SODIUM SUCCINATE 40 MG: 40 INJECTION, POWDER, FOR SOLUTION INTRAMUSCULAR; INTRAVENOUS at 05:07

## 2023-10-14 RX ADMIN — SODIUM CHLORIDE 1 G: 1 TABLET ORAL at 09:46

## 2023-10-14 RX ADMIN — HEPARIN SODIUM 5000 UNITS: 5000 INJECTION INTRAVENOUS; SUBCUTANEOUS at 05:07

## 2023-10-14 RX ADMIN — FORMOTEROL FUMARATE DIHYDRATE 20 MCG: 20 SOLUTION RESPIRATORY (INHALATION) at 07:10

## 2023-10-14 NOTE — CASE MANAGEMENT
Case Management Discharge Planning Note    Patient name Meghann Corrales.   Location S /S -01 MRN [id number]  : 1955 Date 10/14/2023       Current Admission Date: 10/11/2023  Current Admission Diagnosis:Acute respiratory failure with hypoxia and hypercapnia [facility])   Patient Active Problem List    Diagnosis Date Noted    Severe protein-calorie malnutrition ([address]) 10/14/2023    Acute respiratory failure with hypoxia and hypercapnia ([address]) 10/11/2023    Lung nodule 2023    Elevated troponin 2023    History of bladder cancer 2023    Pulmonary cachexia due to COPD ([address])     Chronic hypercapnia/KEVIN     Metabolic encephalopathy     End-stage COPD with acute exacerbation ([address]) 2023    Palliative care patient 2023    Alkalosis 2023    Malnutrition ([address]) 06/10/2023    Hyperlipidemia 2023    COPD exacerbation ([address]) 2023    Steroid-induced hyperglycemia 2023    Physical deconditioning 2023    Chronic anemia 2023    SIRS (systemic inflammatory response syndrome) 2023    Chronic hyponatremia 2023    Chronic HFrEF (heart failure with reduced ejection fraction)  2022    Protein-calorie malnutrition ([address]) 2022    Acute on chronic respiratory failure with hypoxia and hypercapnia  2022    Pulmonary nodules/lesions, multiple 2022    Prostate cancer ([address]) 2021    BPH with obstruction/lower urinary tract symptoms 2021    Goals of care, counseling/discussion 2021    Chronic respiratory failure with hypoxia and hypercapnia ([address]) 2020    Macrocytosis without anemia 2019    Other insomnia 2019    GERD (gastroesophageal reflux disease) 2017    Nonobstructive atherosclerosis of coronary artery 2016    Mood disorder ([address]) 2015    Prediabetes 2015    Osteoporosis 10/14/2014    Vitamin D deficiency 10/14/2014    Nonischemic cardiomyopathy ([address]) 09/26/2014    Left bundle-branch block 08/03/2013    Osteoarthritis 08/03/2013    Obstructive sleep apnea 07/29/2013    COPD exacerbation 07/18/2012      LOS (days): 3  Geometric Mean LOS (GMLOS) (days): 3.60  Days to GMLOS:0.8     OBJECTIVE:  Risk of Unplanned Readmission Score: 42.72         Current admission status: Inpatient   Preferred Pharmacy:   [facility]/pharmacy #[id number] CastilloStephanie griffinregino  [address] [place] [address]  Phone: [phone] Fax: [phone]    Primary Care Provider: Phillip Ortiz DO    Primary Insurance: BLUE CROSS  Secondary Insurance: MEDICARE    DISCHARGE DETAILS:  CM updated per SLIM, patient is medically stable. CM sent updated referral to [facility] [place]. CM updated [facility] patient discharge home today. Patient recommended for oxymizer at home. CM spoke with [facility] [place] weekend on call at [phone]. CM waiting callback from [facility] to discuss delivery of oxymizer to home.

## 2023-10-14 NOTE — ACP (ADVANCE CARE PLANNING)
Advanced Care Planning Progress Note    Serious Illness Conversation    1. What is your understanding now of where you are with your illness? 2. How much information about what is likely to be ahead with your illness would you like to have? Information: patient wants to be informed of big picture, but not details  Patient understands poor prognosis due to his end-stage COPD. He agrees that his condition is getting worse with each exacerbation. He wished to spend his time at home. He does want to continue active treatment for COPD. Palliative care consultation was discussed but patient declined at this time     3. What did you (clinician) communicate to the patient? Prognostic Communication: Uncertain - It can be difficult to predict what will happen with your illness. I hope you will continue to live well for a long time but I’m worried that you could get sick quickly, and I think it is important to prepare for that possibility. 4. If your health situation worsens, what are your most important goals? Goals: be at home, be emotionally at peace, be physically comfortable     5. What are the biggest fears and worries about the future and your health? Fears/Worries: loss of mobility, loss of control, concerns about the meaning of life, ability to care for others - children, ill spouse     6. What abilities are so critical to your life that you cannot imagine living without them? Unacceptable Function: being chronically confused or not being myself, being in pain or very uncomfortable, being unconscious, being unable to talk, being unable to communicate effectively     7. What gives you strength as you think about the future with your illness? 8. If you become sicker, how much are you willing to go through for the possibility of gaining more time? Be in the hospital: Yes     Be uncomfortable: No    Undergo aggressive test and/or procedures: No   9.  How much does your proxy and family know about your priorities and wishes? Discussion Discussion: extensive discussion with family about goals and wishes     Hillary Joseph heard you say that being comfortable is really important to you. Keeping that in mind, and what we know about your illness, I recommend that we follow-up with palliative care in outpatient setting. This will help us make sure that your treatment plans reflect what’s important to you. How does this plan sound to you? I will do everything I can to help you through this.   Patient verbalized understanding of the plan     I have spent 35minutes speaking with my patient on advanced care planning today or during this visit     Advanced directives  Five Wishes: Patient does not have Five Wishes- would not like information         Ryne Nick MD

## 2023-10-14 NOTE — ASSESSMENT & PLAN NOTE
Lab Results   Component Value Date    SODIUM 135 10/14/2023    SODIUM 133 (L) 10/13/2023   Patient has a chronic hyponatremia. Baseline 125-135  Normalized.   Continue salt tablets 1 g twice daily with meals  BMP in 1 week recommended

## 2023-10-14 NOTE — ASSESSMENT & PLAN NOTE
Patient came in with shortness of breath. Was lethargic since past 2 days. Referred to ED by his pulmonologist.  In ED VBG showed acidosis with hypoxia and hypercapnia  Patient use trilogy vent at night, in daytime use 3 L O2 at rest and 4 L O2 on exertion  Last PFT(2020): FEV1/FVC-30%, spirometry showed airflow obstruction  CT chest(7/31/23): Moderate patchy bilateral groundglass opacity and consolidation, new from December 2022, infectious/inflammatory. New 6 mm right upper lobe nodule. Per 2017 Fleischner Society guidelines, recommend follow-up with a chest CT in 6 months. Moderate emphysema.   Etiology: COPD exacerbation vs component of heart failure  COVID 19 negative     Plan:  Stable for discharge on 5 L supplementary oxygen

## 2023-10-14 NOTE — ASSESSMENT & PLAN NOTE
Malnutrition Findings:   Adult Malnutrition type: Acute illness (in the setting of chronic illness)  Adult Degree of Malnutrition: Other severe protein calorie malnutrition  Malnutrition Characteristics: Fat loss, Muscle loss, Weight loss   Could be COPD related with increase use of muscle for breathing. BMI Findings:  Adult BMI Classifications: Underweight < 18.5     Body mass index is 18.47 kg/m²        Plan:   Ensure protein shakes recommended with meals  .

## 2023-10-14 NOTE — DISCHARGE INSTR - AVS FIRST PAGE
Dear Warner Rivera,     It was our pleasure to care for you here at [facility]. It is our hope that we were always able to exceed the expected standards for your care during your stay. You were hospitalized due to COPD exacerbation. You were cared for on the [facility] fourth floor by Jairo Huber MD under the service of Xiomy Ortiz MD with the [facility] who covers for your primary care physician (PCP), Marly Swartz DO, while you were hospitalized. If you have any questions or concerns related to this hospitalization, you may contact us at [phone]. For follow up as well as any medication refills, we recommend that you follow up with your primary care physician. A registered nurse will reach out to you by phone within a few days after your discharge to answer any additional questions that you may have after going home. However, at this time we provide for you here, the most important instructions / recommendations at discharge:     Notable Medication Adjustments -   You have been prescribed a prednisone taper. Please take 50 mg for 3 days followed by 40 mg for 3 days followed by 20 mg for 3 days followed by 10 mg for 3 days. Afterwards you may resume taking your normal 5 mg daily. Please start taking one 1 g tablet of sodium chloride 2 times daily with a meal.  Testing Required after Discharge -   Please ask your PCP to order a BMP to check your sodium levels  ** Please contact your PCP to request testing orders for any of the testing recommended here **  Important follow up information -   Please follow-up with your PCP within 1 to 2 weeks of discharge  Please follow-up with your pulmonologist as soon as possible.   Other Instructions -   If your shortness of breath returns, please call your PCP or 911 or return to the emergency department  Please review this entire after visit summary as additional general instructions including medication list, appointments, activity, diet, any pertinent wound care, and other additional recommendations from your care team that may be provided for you.       Sincerely,     Daryle Mink, MD

## 2023-10-14 NOTE — ASSESSMENT & PLAN NOTE
Lab Results   Component Value Date    SARSCOV2 Negative 10/12/2023    INFLUA Negative 10/12/2023    INFLUB Negative 10/12/2023    RSV Negative 10/12/2023   Smoking Status: Former  Patient use Pulmicort/Perforomist twice daily nebulization at home and Proventil as needed    Plan:  -Stable for discharge today  Supplementary oxygen 5 L  Continue home scheduled nebulizer treatments and inhalers  Will discharge on prednisone taper

## 2023-10-15 LAB
ATRIAL RATE: 87 BPM
P AXIS: 82 DEGREES
PR INTERVAL: 178 MS
QRS AXIS: 26 DEGREES
QRSD INTERVAL: 138 MS
QT INTERVAL: 424 MS
QTC INTERVAL: 510 MS
T WAVE AXIS: 92 DEGREES
VENTRICULAR RATE: 87 BPM

## 2023-10-15 PROCEDURE — 93010 ELECTROCARDIOGRAM REPORT: CPT | Performed by: INTERNAL MEDICINE

## 2023-10-16 ENCOUNTER — TRANSITIONAL CARE MANAGEMENT (OUTPATIENT)
Dept: INTERNAL MEDICINE CLINIC | Facility: CLINIC | Age: 68
End: 2023-10-16

## 2023-10-16 NOTE — UTILIZATION REVIEW
NOTIFICATION OF ADMISSION DISCHARGE   This is a Notification of Discharge from [address] [facility]. Please be advised that this patient has been discharge from our facility. Below you will find the admission and discharge date and time including the patient’s disposition. UTILIZATION REVIEW CONTACT:  Daljit Joseph  Utilization   Network Utilization Review Department  Phone: [phone] x carefully listen to the prompts. All voicemails are confidential.  Email: [email or web address]     ADMISSION INFORMATION  PRESENTATION DATE: 10/11/2023  9:35 AM  OBERVATION ADMISSION DATE:   INPATIENT ADMISSION DATE: 10/11/23  3:59 PM   DISCHARGE DATE: 10/14/2023  4:04 PM   DISPOSITION:Home/Self Care    Network Utilization Review Department  ATTENTION: Please call with any questions or concerns to [phone] and carefully listen to the prompts so that you are directed to the right person. All voicemails are confidential.   For Discharge needs, contact Care Management DC Support Team at [phone] opt. 2  Send all requests for admission clinical reviews, approved or denied determinations and any other requests to dedicated fax number below belonging to the campus where the patient is receiving treatment.  List of dedicated fax numbers for the Facilities:  [place] DENIALS (Administrative/Medical Necessity) [phone]   DISCHARGE SUPPORT TEAM (Network) [phone] [address] [facility] (Maternity/NICU/Pediatrics) [phone]   [address] [facility] [address] [phone] [address] [facility] [phone]   [address] [place] [address]  [address]  [phone]

## 2023-10-21 DIAGNOSIS — I50.22 CHRONIC HFREF (HEART FAILURE WITH REDUCED EJECTION FRACTION) (HCC): ICD-10-CM

## 2023-10-23 RX ORDER — TORSEMIDE 10 MG/1
40 TABLET ORAL DAILY
Qty: 360 TABLET | Refills: 2 | Status: SHIPPED | OUTPATIENT
Start: 2023-10-23

## 2023-10-27 ENCOUNTER — TELEPHONE (OUTPATIENT)
Dept: PALLIATIVE MEDICINE | Facility: CLINIC | Age: 68
End: 2023-10-27

## 2023-11-01 ENCOUNTER — TELEPHONE (OUTPATIENT)
Dept: PALLIATIVE MEDICINE | Facility: CLINIC | Age: 68
End: 2023-11-01

## 2023-11-06 ENCOUNTER — TELEMEDICINE (OUTPATIENT)
Dept: PALLIATIVE MEDICINE | Facility: CLINIC | Age: 68
End: 2023-11-06
Payer: COMMERCIAL

## 2023-11-06 DIAGNOSIS — E43 SEVERE PROTEIN-CALORIE MALNUTRITION (HCC): Primary | ICD-10-CM

## 2023-11-06 DIAGNOSIS — J96.11 CHRONIC RESPIRATORY FAILURE WITH HYPOXIA AND HYPERCAPNIA (HCC): ICD-10-CM

## 2023-11-06 DIAGNOSIS — I50.22 CHRONIC HFREF (HEART FAILURE WITH REDUCED EJECTION FRACTION) (HCC): Chronic | ICD-10-CM

## 2023-11-06 DIAGNOSIS — J44.1 COPD WITH ACUTE EXACERBATION (HCC): ICD-10-CM

## 2023-11-06 DIAGNOSIS — Z71.89 GOALS OF CARE, COUNSELING/DISCUSSION: ICD-10-CM

## 2023-11-06 DIAGNOSIS — J96.12 CHRONIC RESPIRATORY FAILURE WITH HYPOXIA AND HYPERCAPNIA (HCC): ICD-10-CM

## 2023-11-06 PROCEDURE — 99213 OFFICE O/P EST LOW 20 MIN: CPT | Performed by: NURSE PRACTITIONER

## 2023-11-06 NOTE — PROGRESS NOTES
Outpatient Virtual Regular Visit - Follow-up with Palliative and [address] [place] [address]. 79 y.o. male [phone]    Intake:    Reason for virtual visit is symptom review and confirmation of goals of care. The patient is unable to visit the clinic safely due to SOB with activity, oxygen demand. After connecting through [employer], the patient was identified by name and date of birth. Wilder Leslie or their agent was informed that this was a telemedicine visit and that the visit is being conducted through the [employer]. He agrees to proceed. .  My office door was closed. No one else was in the room. They acknowledged consent and understanding of privacy and security of the video platform. The patient or agent has agreed to participate and understands they can discontinue the visit at any time. Assessment & Plan  Problem List Items Addressed This Visit          Respiratory    Chronic respiratory failure with hypoxia and hypercapnia (HCC)    End-stage COPD with acute exacerbation (HCC)       Cardiovascular and Mediastinum    Chronic HFrEF (heart failure with reduced ejection fraction)  (Chronic)       Other    Goals of care, counseling/discussion    Severe protein-calorie malnutrition ([address]) - Primary       Medications adjusted this encounter:  Requested Prescriptions      No prescriptions requested or ordered in this encounter     No orders of the defined types were placed in this encounter. There are no discontinued medications. Wilder Leslie was seen today for symptoms and planning cares related to above illnesses. I have reviewed the patient's controlled substance dispensing history in the Prescription Drug Monitoring Program in compliance with the [facility] regulations before prescribing any controlled substances. They are invited to continue to follow with us.   If there are questions or concerns, please contact us through our clinic/answering service 24 hours a day, seven days a week. DELIA Gill  [facility]  [phone]        Visit Information    Accompanied By: No one    Source of History: Self    History Limitations: None      History of Present Illness      Sixto Hernandez is a 79 y.o. male who presents in follow up of symptoms related to severe COPD and Chronica congestive heart failure. Pertinent issues include: symptom management, breathlessness, assessment of goals of care. The patient is seen sitting in a chair with oxygen on. He reports overall he has been feeling "good". He reports his oxygen levels are variable depending on activity and are in the upper 70's with exertion. He reports his weight has been stable and his appetite is good. He does get SOB with eating and has a routine of taking his time for recovery while eating. He continues to take melatonin at night to increase quality of sleep which he reports is effective. He denies pain. He states seeing his son grow and watching him do his homework brings him ana every day. Goals of care have been clear, full disease focused cares without limits. Patient  confirms this to be his wishes.      Past Medical History:   Diagnosis Date    Acute metabolic encephalopathy 76/01/0712    Arthritis     Bladder mass     Cardiomyopathy (HCC)     Chest pain     COPD (chronic obstructive pulmonary disease) ([address])     COVID-19 virus infection 02/23/2023    CPAP (continuous positive airway pressure) dependence     Emphysema of lung (HCC)     Hypoxia     nocturnal    Left bundle branch block     Multiple pulmonary nodules     last assessed: 10/12/16    Pneumonia     Sleep apnea, obstructive     Smoker     Weight loss 08/29/2019     Past Surgical History:   Procedure Laterality Date    COLONOSCOPY      CYSTOSCOPY      CYSTOSCOPY  02/17/2021    NM BLADDER INSTILLATION ANTICARCINOGENIC AGENT N/A 1/3/2020    Procedure: INSTILLATION MITOMYCIN;  Surgeon: Mirella Jones MD;  [facility];  Service: Urology    OR CYSTO W/REMOVAL OF LESIONS SMALL N/A 1/3/2020    Procedure: TRANSURETHRAL RESECTION OF BLADDER TUMOR (TURBT); Surgeon: Rey Reyes MD;  [facility];  Service: Urology    OR CYSTOURETHROSCOPY WITH BIOPSY N/A 4/13/2021    Procedure: Johana Christophe;  Surgeon: Rey Reyes MD;  [facility];  Service: Urology    OR TRURL ELECTROSURG Imani [address] Dr N/A 4/13/2021    Procedure: TRANSURETHRAL RESECTION OF PROSTATE (TURP) BLADDER BIOPSY, FULGURATION;  Surgeon: Rey Reyes MD;  [facility];  Service: Urology     Current Outpatient Medications   Medication Sig Dispense Refill    albuterol (PROVENTIL HFA,VENTOLIN HFA) 90 mcg/act inhaler Inhale 2 puffs every 4 (four) hours as needed for wheezing or shortness of breath 18 g 5    budesonide (Pulmicort) 1 MG/2ML nebulizer solution Take 2 mL (1 mg total) by nebulization daily Rinse mouth after use. 120 mL 5    cyanocobalamin (VITAMIN B-12) 1000 MCG tablet Take 1 tablet (1,000 mcg total) by mouth daily 30 tablet 0    Diclofenac Sodium (VOLTAREN) 1 % APPLY 2 GRAMS 4 TIMES A DAY      ergocalciferol (VITAMIN D2) 50,000 units       formoterol (PERFOROMIST) 20 MCG/2ML nebulizer solution TAKE 2 ML (20 MCG TOTAL) BY NEBULIZATION 2 (TWO) TIMES A  mL 5    Insulin Pen Needle (BD Pen Needle Cathie 2nd Gen) 32G X 4 MM MISC For use with insulin pen. Pharmacy may dispense brand covered by insurance.  (Patient not taking: Reported on 10/11/2023) 100 each 0    insuln lispro (HumaLOG KwikPen) 200 units/mL CONCENTRATED U-200 injection pen Inject 3 Units under the skin 3 (three) times a day with meals (Patient not taking: Reported on 10/11/2023) 6 mL 0    ipratropium-albuterol (DUO-NEB) 0.5-2.5 mg/3 mL nebulizer solution Take 3 mL by nebulization 4 (four) times a day 180 mL 5    Melatonin 5 MG TABS Take 1 tablet by mouth daily at bedtime      NovoLOG FlexPen 100 units/mL injection pen INJECT 3 UNITS UDNER THE SKIN 3 TIMES A DAY WITH MEALS (Patient not taking: Reported on 9/19/2023)      OneTouch Delica Lancets 41R MISC May substitute brand based on insurance coverage. Check glucose TID. (Patient not taking: Reported on 10/11/2023) 100 each 0    predniSONE 5 mg tablet Take 1 tablet (5 mg total) by mouth daily Resume prednisone 5mg daily when taper is completed 30 tablet 0    rosuvastatin (CRESTOR) 10 MG tablet Take 1 tablet (10 mg total) by mouth daily 90 tablet 3    sodium chloride (OCEAN) 0.65 % nasal spray 1 spray into each nostril every hour as needed for congestion 30 mL 0    sodium chloride 1 g tablet Take 1 tablet (1 g total) by mouth 2 (two) times a day with meals 60 tablet 0    tamsulosin (FLOMAX) 0.4 mg Take 1 capsule (0.4 mg total) by mouth daily with dinner (Patient not taking: Reported on 9/19/2023)  0    torsemide (DEMADEX) 10 mg tablet TAKE 4 TABLETS BY MOUTH DAILY. 360 tablet 2     No current facility-administered medications for this visit. No Known Allergies    Review of Systems   Constitutional:  Positive for fatigue. Respiratory:  Positive for shortness of breath. All other systems reviewed and are negative. Video Exam  There were no vitals filed for this visit. Physical Exam  Vitals and nursing note reviewed. Constitutional:       General: He is awake. He is not in acute distress. Appearance: He is well-developed. He is ill-appearing. Interventions: Nasal cannula in place. HENT:      Head: Normocephalic and atraumatic. Eyes:      Conjunctiva/sclera: Conjunctivae normal.   Cardiovascular:      Rate and Rhythm: Normal rate and regular rhythm. Heart sounds: No murmur heard. Pulmonary:      Effort: Pulmonary effort is normal. No respiratory distress. Comments: No conversational dyspnea noted. Abdominal:      Palpations: Abdomen is soft. Tenderness: There is no abdominal tenderness. Musculoskeletal:         General: No swelling. Cervical back: Neck supple. Skin:     General: Skin is warm and dry. Neurological:      General: No focal deficit present. Mental Status: He is alert and oriented to person, place, and time. Psychiatric:         Attention and Perception: Attention normal.         Mood and Affect: Mood normal.         Behavior: Behavior is cooperative. Cognition and Memory: Cognition and memory normal.         I spent 20+ minutes was spent during this virtual visit by Lynnette, face to face with his family with greater than 50% of the time spent in counseling or coordination of care including discussions of risks and benefits of treatment, instructions for disease self management, treatment instructions, and follow up requirements . All of the patient's or agent's questions were answered during this discussion. Encounter provider Jana Jordan, located at:  [address]  [address] [place] [id number] [phone]    Recent Visits  Date Type Provider Dept   11/01/23 Telephone [address] [facility],5Th Floor   Showing recent visits within past 7 days and meeting all other requirements  Today's Visits  Date Type Provider Dept   11/06/23 Telemedicine April Olya Notice, [address] [facility]   Showing today's visits and meeting all other requirements  Future Appointments  No visits were found meeting these conditions. Showing future appointments within next 150 days and meeting all other requirements       VIRTUAL VISIT DISCLAIMER    Liza Bojorquez. or their agent has consented to an online visit or consultation. They understands that the online visit is based solely on information provided by the patient or agent, and that, in the absence of a face-to-face physical evaluation by the physician, the diagnosis they receive is both limited and provisional in terms of accuracy and completeness.   This is not intended to replace a full medical face-to-face evaluation by the physician. Tamie Means. or their agent understands and accepts these terms. The patient or their agent was informed this is a billable service.

## 2024-01-02 NOTE — UTILIZATION REVIEW
Continued Stay Review    Date:  5/9/23                 Current Patient Class: IP Current Level of Care: MS    HPI:68 y o  male initially admitted on 4/30    Assessment/Plan:   Continues on q 6 hr nebs RTC  Transitioned to oral Prednisone taper today  Remains on oral antibiotics, Torsemide, Lopressor  Has some SOB on ambulation today  BIPAP at HS  Pt is ? AICD pt  Cardio following  Stable today, poss d/c to home tomorrow if he remains stable overnight       Vital Signs:   05/09/23 1359 -- -- -- -- -- 94 % -- -- --   05/09/23 1003 -- -- -- -- -- -- 3 L/min -- --   05/09/23 08:10:48 98 4 °F (36 9 °C) 86 18 107/57 74 97 % -- -- --   05/09/23 0710 -- -- -- -- -- 98 % -- -- --   05/09/23 0319 -- -- -- -- -- -- -- -- Face mask   05/09/23 03:13:22 -- 83 -- 110/57 75 98 % -- -- --   05/09/23 0221 -- -- -- -- -- 94 % -- -- --   05/08/23 23:05:59 98 7 °F (37 1 °C) 86 18 91/50 64 Abnormal  96 % -- -- --   05/08/23 1952 -- -- -- -- -- 99 % -- -- Face mask   05/08/23 1951 -- -- -- -- -- -- 3 L/min Nasal cannula --   05/08/23 1911 -- -- -- -- -- 95 % -- -- --   05/08/23 15:32:15 98 °F (36 7 °C) 95 16 112/61 78 94 % -- -- --   05/08/23 1326 -- -- -- -- -- 96 % -- -- --   05/08/23 0817 -- -- -- -- -- -- 3 L/min -- --   05/08/23 0718 -- -- -- -- -- 96 % 3 L/min Nasal cannula --   05/08/23 07:16:17 98 2 °F (36 8 °C) 87 14 101/56 71 97 % -- -- --   05/08/23 0300 -- -- -- -- -- 96 % -- -- --   05/07/23 22:16:03 99 1 °F (37 3 °C) 99 12 112/67 82 95 % -- -- --   05/07/23 1954 -- -- -- -- -- -- -- None (Room air) --   05/07/23 1947 -- -- -- -- -- 98 % -- -- --   05/07/23 15:47:28 99 °F (37 2 °C) 88 20 110/62 78 97 % -- -- --   05/07/23 1334 -- -- -- -- -- 97 % -- -- --   05/07/23 0900 -- -- -- -- -- -- 3 L/min Nasal cannula --   05/07/23 0733 -- -- -- -- -- 98 % -- -- --   05/07/23 07:14:52 98 7 °F (37 1 °C) 89 18 108/57 74 98 % -- -- --   05/07/23 0250 -- -- -- -- -- 96 % -- -- --     Pertinent Labs/Diagnostic Results:       Results from last 7 days   Lab Units 05/05/23  0503 05/04/23  0443 05/03/23  0520   WBC Thousand/uL 7 06 6 54 6 70   HEMOGLOBIN g/dL 9 5* 10 0* 9 8*   HEMATOCRIT % 29 9* 32 8* 31 0*   PLATELETS Thousands/uL 213 235 224   NEUTROS ABS Thousands/µL 5 85 4 71 5 98         Results from last 7 days   Lab Units 05/05/23  0503 05/04/23  0443 05/03/23  0520   SODIUM mmol/L 132* 133* 130*   POTASSIUM mmol/L 3 5 3 6 4 2   CHLORIDE mmol/L 93* 90* 89*   CO2 mmol/L 40* 43* 39*   ANION GAP mmol/L -1* 0* 2*   BUN mg/dL 19 19 22   CREATININE mg/dL 0 54* 0 56* 0 55*   EGFR ml/min/1 73sq m 109 107 108   CALCIUM mg/dL 8 9 8 6 8 6             Results from last 7 days   Lab Units 05/05/23  0503 05/04/23  0443 05/03/23  0520   GLUCOSE RANDOM mg/dL 91 77 201*     Medications:   Scheduled Medications:  aspirin, 81 mg, Oral, Daily  azithromycin, 250 mg, Oral, Q24H  budesonide, 0 5 mg, Nebulization, Q12H  cyanocobalamin, 1,000 mcg, Oral, Daily  enoxaparin, 40 mg, Subcutaneous, Daily  ergocalciferol, 50,000 Units, Oral, Q28 Days  formoterol, 20 mcg, Nebulization, Q12H  guaiFENesin, 1,200 mg, Oral, Q12H GABE  ipratropium, 0 5 mg, Nebulization, Q6H  levalbuterol, 1 25 mg, Nebulization, Q6H  melatonin, 3 mg, Oral, HS  pravastatin, 80 mg, Oral, Daily With Dinner  [START ON 5/10/2023] predniSONE, 40 mg, Oral, Daily   Followed by  Tremayne Iraheta ON 5/13/2023] predniSONE, 30 mg, Oral, Daily   Followed by  Tremayne Iraheta ON 5/16/2023] predniSONE, 20 mg, Oral, Daily   Followed by  Tremayne Iraheta ON 5/19/2023] predniSONE, 10 mg, Oral, Daily  tamsulosin, 0 4 mg, Oral, Daily With Dinner  torsemide, 20 mg, Oral, Daily      Continuous IV Infusions:     PRN Meds:  levalbuterol, 1 25 mg, Nebulization, Q6H PRN    Discharge Plan: Home with [address] Utilization Review Department  ATTENTION: Please call with any questions or concerns to [phone] and carefully listen to the prompts so that you are directed to the right person   All voicemails are confidential   Chapito Samson all requests for admission clinical reviews, approved or denied determinations and any other requests to dedicated fax number below belonging to the campus where the patient is receiving treatment   List of dedicated fax numbers for the Facilities:  [address] DENIALS (Administrative/Medical Necessity) [phone]   [address] (Maternity/NICU/Pediatrics) [phone]   [address] [phone]   [facility] [address] [phone]   [address] [address] [phone]   [address] [facility] [address] [facility] [phone]

## 2024-01-09 ENCOUNTER — TELEPHONE (OUTPATIENT)
Age: 69
End: 2024-01-09

## 2024-01-09 DIAGNOSIS — J44.1 COPD EXACERBATION (HCC): Primary | ICD-10-CM

## 2024-01-09 DIAGNOSIS — J43.9 EMPHYSEMA, UNSPECIFIED (HCC): ICD-10-CM

## 2024-01-09 RX ORDER — IPRATROPIUM BROMIDE AND ALBUTEROL SULFATE 2.5; .5 MG/3ML; MG/3ML
3 SOLUTION RESPIRATORY (INHALATION) 4 TIMES DAILY
Qty: 360 ML | Refills: 5 | Status: SHIPPED | OUTPATIENT
Start: 2024-01-09 | End: 2024-01-10 | Stop reason: SDUPTHER

## 2024-01-10 ENCOUNTER — TELEPHONE (OUTPATIENT)
Age: 69
End: 2024-01-10

## 2024-01-10 ENCOUNTER — APPOINTMENT (OUTPATIENT)
Dept: LAB | Facility: CLINIC | Age: 69
End: 2024-01-10
Payer: MEDICARE

## 2024-01-10 ENCOUNTER — OFFICE VISIT (OUTPATIENT)
Dept: PULMONOLOGY | Facility: CLINIC | Age: 69
End: 2024-01-10
Payer: MEDICARE

## 2024-01-10 VITALS
DIASTOLIC BLOOD PRESSURE: 63 MMHG | BODY MASS INDEX: 17.66 KG/M2 | HEART RATE: 87 BPM | SYSTOLIC BLOOD PRESSURE: 108 MMHG | TEMPERATURE: 97 F | HEIGHT: 62 IN | WEIGHT: 96 LBS

## 2024-01-10 DIAGNOSIS — J96.11 CHRONIC RESPIRATORY FAILURE WITH HYPOXIA AND HYPERCAPNIA (HCC): ICD-10-CM

## 2024-01-10 DIAGNOSIS — J43.9 EMPHYSEMA, UNSPECIFIED (HCC): ICD-10-CM

## 2024-01-10 DIAGNOSIS — G47.33 OBSTRUCTIVE SLEEP APNEA: ICD-10-CM

## 2024-01-10 DIAGNOSIS — Z99.11 DEPENDENCE ON RESPIRATOR (VENTILATOR) STATUS (HCC): ICD-10-CM

## 2024-01-10 DIAGNOSIS — J44.1 COPD WITH ACUTE EXACERBATION (HCC): ICD-10-CM

## 2024-01-10 DIAGNOSIS — Z23 NEEDS FLU SHOT: ICD-10-CM

## 2024-01-10 DIAGNOSIS — E87.1 CHRONIC HYPONATREMIA: ICD-10-CM

## 2024-01-10 DIAGNOSIS — R64 PULMONARY CACHEXIA DUE TO COPD (HCC): ICD-10-CM

## 2024-01-10 DIAGNOSIS — E87.1 HYPONATREMIA: ICD-10-CM

## 2024-01-10 DIAGNOSIS — J96.11 CHRONIC RESPIRATORY FAILURE WITH HYPOXIA AND HYPERCAPNIA (HCC): Primary | ICD-10-CM

## 2024-01-10 DIAGNOSIS — J96.12 CHRONIC RESPIRATORY FAILURE WITH HYPOXIA AND HYPERCAPNIA (HCC): Primary | ICD-10-CM

## 2024-01-10 DIAGNOSIS — J96.12 CHRONIC RESPIRATORY FAILURE WITH HYPOXIA AND HYPERCAPNIA (HCC): ICD-10-CM

## 2024-01-10 DIAGNOSIS — J44.9 PULMONARY CACHEXIA DUE TO COPD (HCC): ICD-10-CM

## 2024-01-10 PROBLEM — J96.21 ACUTE ON CHRONIC RESPIRATORY FAILURE WITH HYPOXIA AND HYPERCAPNIA (HCC): Status: RESOLVED | Noted: 2022-03-27 | Resolved: 2024-01-10

## 2024-01-10 PROBLEM — J96.22 ACUTE ON CHRONIC RESPIRATORY FAILURE WITH HYPOXIA AND HYPERCAPNIA (HCC): Status: RESOLVED | Noted: 2022-03-27 | Resolved: 2024-01-10

## 2024-01-10 PROBLEM — J96.02 ACUTE RESPIRATORY FAILURE WITH HYPOXIA AND HYPERCAPNIA (HCC): Status: RESOLVED | Noted: 2023-10-11 | Resolved: 2024-01-10

## 2024-01-10 PROBLEM — J96.01 ACUTE RESPIRATORY FAILURE WITH HYPOXIA AND HYPERCAPNIA (HCC): Status: RESOLVED | Noted: 2023-10-11 | Resolved: 2024-01-10

## 2024-01-10 PROBLEM — R91.1 LUNG NODULE: Status: RESOLVED | Noted: 2023-08-01 | Resolved: 2024-01-10

## 2024-01-10 LAB
ANION GAP SERPL CALCULATED.3IONS-SCNC: 5 MMOL/L
BUN SERPL-MCNC: 14 MG/DL (ref 5–25)
CALCIUM SERPL-MCNC: 9.3 MG/DL (ref 8.4–10.2)
CHLORIDE SERPL-SCNC: 86 MMOL/L (ref 96–108)
CO2 SERPL-SCNC: 44 MMOL/L (ref 21–32)
CREAT SERPL-MCNC: 0.94 MG/DL (ref 0.6–1.3)
GFR SERPL CREATININE-BSD FRML MDRD: 84 ML/MIN/1.73SQ M
GLUCOSE SERPL-MCNC: 82 MG/DL (ref 65–140)
POTASSIUM SERPL-SCNC: 3.4 MMOL/L (ref 3.5–5.3)
SODIUM SERPL-SCNC: 135 MMOL/L (ref 135–147)

## 2024-01-10 PROCEDURE — 99214 OFFICE O/P EST MOD 30 MIN: CPT | Performed by: INTERNAL MEDICINE

## 2024-01-10 PROCEDURE — 90662 IIV NO PRSV INCREASED AG IM: CPT

## 2024-01-10 PROCEDURE — 80048 BASIC METABOLIC PNL TOTAL CA: CPT

## 2024-01-10 PROCEDURE — G0008 ADMIN INFLUENZA VIRUS VAC: HCPCS

## 2024-01-10 PROCEDURE — 36415 COLL VENOUS BLD VENIPUNCTURE: CPT

## 2024-01-10 RX ORDER — IPRATROPIUM BROMIDE AND ALBUTEROL SULFATE 2.5; .5 MG/3ML; MG/3ML
3 SOLUTION RESPIRATORY (INHALATION) 4 TIMES DAILY
Qty: 360 ML | Refills: 11 | Status: SHIPPED | OUTPATIENT
Start: 2024-01-10 | End: 2025-01-04

## 2024-01-10 NOTE — ASSESSMENT & PLAN NOTE
He continues to work on increasing his diet.  He has been using protein shakes to supplement.                             BMI Findings:           Body mass index is 17.56 kg/m².

## 2024-01-10 NOTE — ASSESSMENT & PLAN NOTE
His wife was requesting a refill on salt tabs.  Because he has not been taking them for 1 month, we will check a BMP today.  Based off of those results we will determine if we need further sodium replacement.

## 2024-01-10 NOTE — TELEPHONE ENCOUNTER
Called and spoke with pt. Reviewed lab results as indicated by Dr. Luque. Pt verbalized understanding of instructions.

## 2024-01-10 NOTE — PROGRESS NOTES
Assessment:    Obstructive sleep apnea  Overall doing well with sleeping at night.  Remains on trilogy vent at night with supplemental oxygen 4 L     Chronic respiratory failure with hypoxia and hypercapnia (HCC)  He continues to use supplemental oxygen 4-6 L.  He monitors his oxygen sats, with goals greater than 88%.  At times the level goes low, and they will increase up to 6 L.  He is on trilogy vent as per above.  I believe this has made the biggest difference in his overall functional status and has kept him out of the hospital.    End-stage COPD with acute exacerbation (HCC)  Last exacerbation was in October, which is the longest he has been out of the hospital.  He will continue on prednisone 5 mg daily.  He will continue on all nebulized regimen with budesonide/Perforomist twice daily, DuoNebs 4 times daily.  He will continue to increase activity as tolerated.    Pulmonary cachexia due to COPD (HCC)  He continues to work on increasing his diet.  He has been using protein shakes to supplement.                             BMI Findings:           Body mass index is 17.56 kg/m².       Chronic hyponatremia  His wife was requesting a refill on salt tabs.  Because he has not been taking them for 1 month, we will check a BMP today.  Based off of those results we will determine if we need further sodium replacement.      Plan:    Diagnoses and all orders for this visit:    Chronic respiratory failure with hypoxia and hypercapnia (HCC)  -     influenza vaccine, high-dose, PF 0.7 mL (FLUZONE HIGH-DOSE)  -     Basic metabolic panel; Future    End-stage COPD with acute exacerbation (HCC)    Obstructive sleep apnea    Pulmonary cachexia due to COPD (HCC)    Emphysema, unspecified (HCC)  -     ipratropium-albuterol (DUO-NEB) 0.5-2.5 mg/3 mL nebulizer solution; Take 3 mL by nebulization 4 (four) times a day    Hyponatremia  -     Basic metabolic panel; Future    Needs flu shot  -     influenza vaccine, high-dose, PF 0.7 mL  (FLUZONE HIGH-DOSE)    Dependence on respirator (ventilator) status (HCC)    Chronic hyponatremia    Follow-up: 3 months      HPI:  Overall he is doing better than he has in a while. Last admission to the hospital was in October    O2 sats are varying - seems to go up and down without reason - not associated with just exertion. He uses 4-6L to maintain sats >88%    On Trilogy at night with O2 at 4L    Baseline congestion - some mild increase but no other signs of acute viral illness.  No fevers or chills.  He has chronic shortness of breath with exertion, chest tightness with occasional wheezing, and chronic cough.  He is using budesonide/Perforomist twice daily, DuoNebs 4 times daily.  He is down to prednisone 5 mg daily and seems to have been doing well for that for a while.    While in the hospital he had hyponatremia and was discharged on sodium tabs.  Unfortunately he has been out for approximately 1 month.    He has not had any vaccines for viral pulmonary issues at this time.  Had Covid illness in 2/2023.    Review of Systems   Constitutional:  Negative for activity change, appetite change and fever.   HENT:  Positive for congestion. Negative for postnasal drip.    Respiratory:  Positive for cough, shortness of breath and wheezing.    Cardiovascular:  Negative for chest pain and leg swelling.   Gastrointestinal:  Negative for nausea and vomiting.   Musculoskeletal:  Positive for gait problem. Negative for arthralgias.   Neurological:  Positive for weakness.   Psychiatric/Behavioral:  Negative for sleep disturbance.        The following portions of the patient's history were reviewed and updated as appropriate: allergies, current medications, past family history, past medical history, past social history, past surgical history, and problem list.    VITALS:  Vitals:    01/10/24 0942   BP: 108/63   BP Location: Left arm   Patient Position: Sitting   Cuff Size: Standard   Pulse: 87   Temp: (!) 97 °F (36.1 °C)  "  Weight: 43.5 kg (96 lb)   Height: 5' 2\" (1.575 m)       Physical Exam  General:  Patient is awake, alert, non-toxic and in no acute respiratory distress on 4 L  CV:  Regular, +S1 and S2, No murmurs, gallops or rubs appreciated  Lungs: Greatly diminished breath sounds in all lung fields without wheeze, rales or rhonchi  Abdomen: Soft, +BS, Non-tender, non-distended  Extremities: Mild lower extremity edema  Neuro: No focal deficits, in wheelchair        Diagnostic Testing:    CBC:  Lab Results   Component Value Date    WBC 7.29 10/14/2023    HGB 10.2 (L) 10/14/2023    HCT 33.6 (L) 10/14/2023    MCV 99 (H) 10/14/2023     10/14/2023         BMP:   Lab Results   Component Value Date     08/17/2015    K 4.0 10/14/2023    CL 82 (L) 10/14/2023    CO2 >45 (HH) 10/14/2023    ANIONGAP 6 08/17/2015    BUN 31 (H) 10/14/2023    CREATININE 0.67 10/14/2023    GLUCOSE 90 08/17/2015    GLUF 124 (H) 03/30/2023    CALCIUM 9.0 10/14/2023    CORRECTEDCA 10.0 07/23/2023    AST 31 10/11/2023    ALT 12 10/11/2023    ALKPHOS 62 10/11/2023    PROT 6.8 08/17/2015    BILITOT 0.60 08/17/2015    EGFR 100 10/14/2023         Imaging studies:  I have personally reviewed pertinent reports.   and I have personally reviewed pertinent films in PACS  Ct chest 7/31/23  IMPRESSION:     No pulmonary embolus.     Moderate patchy bilateral groundglass opacity and consolidation, new from December 2022, infectious/inflammatory.     New 6 mm right upper lobe nodule. Per 2017 Fleischner Society guidelines, recommend follow-up with a chest CT in 6 months.     Moderate emphysema.      Malia Luque, DO  "

## 2024-01-10 NOTE — ASSESSMENT & PLAN NOTE
He continues to use supplemental oxygen 4-6 L.  He monitors his oxygen sats, with goals greater than 88%.  At times the level goes low, and they will increase up to 6 L.  He is on trilogy vent as per above.  I believe this has made the biggest difference in his overall functional status and has kept him out of the hospital.

## 2024-01-10 NOTE — ASSESSMENT & PLAN NOTE
Last exacerbation was in October, which is the longest he has been out of the hospital.  He will continue on prednisone 5 mg daily.  He will continue on all nebulized regimen with budesonide/Perforomist twice daily, DuoNebs 4 times daily.  He will continue to increase activity as tolerated.

## 2024-01-10 NOTE — ASSESSMENT & PLAN NOTE
Overall doing well with sleeping at night.  Remains on trilogy vent at night with supplemental oxygen 4 L

## 2024-01-22 ENCOUNTER — OFFICE VISIT (OUTPATIENT)
Dept: CARDIOLOGY CLINIC | Facility: CLINIC | Age: 69
End: 2024-01-22
Payer: COMMERCIAL

## 2024-01-22 VITALS
HEIGHT: 62 IN | OXYGEN SATURATION: 98 % | HEART RATE: 94 BPM | WEIGHT: 103 LBS | SYSTOLIC BLOOD PRESSURE: 100 MMHG | DIASTOLIC BLOOD PRESSURE: 58 MMHG | BODY MASS INDEX: 18.95 KG/M2

## 2024-01-22 DIAGNOSIS — J96.12 CHRONIC RESPIRATORY FAILURE WITH HYPOXIA AND HYPERCAPNIA (HCC): ICD-10-CM

## 2024-01-22 DIAGNOSIS — E78.5 HYPERLIPIDEMIA, UNSPECIFIED HYPERLIPIDEMIA TYPE: ICD-10-CM

## 2024-01-22 DIAGNOSIS — J96.11 CHRONIC RESPIRATORY FAILURE WITH HYPOXIA AND HYPERCAPNIA (HCC): ICD-10-CM

## 2024-01-22 DIAGNOSIS — J44.1 COPD WITH ACUTE EXACERBATION (HCC): ICD-10-CM

## 2024-01-22 DIAGNOSIS — I44.7 LEFT BUNDLE-BRANCH BLOCK: Chronic | ICD-10-CM

## 2024-01-22 DIAGNOSIS — I50.22 CHRONIC HFREF (HEART FAILURE WITH REDUCED EJECTION FRACTION) (HCC): Primary | Chronic | ICD-10-CM

## 2024-01-22 DIAGNOSIS — I42.8 NONISCHEMIC CARDIOMYOPATHY (HCC): ICD-10-CM

## 2024-01-22 PROCEDURE — 99214 OFFICE O/P EST MOD 30 MIN: CPT | Performed by: INTERNAL MEDICINE

## 2024-01-22 NOTE — PROGRESS NOTES
Cardiology Follow Up    Natalio Hartmann Jr.  1955  [id number]  HEART & VASCULAR [facility]  [address]  [place] [address]    1. Chronic HFrEF (heart failure with reduced ejection fraction)   Echo complete w/ contrast if indicated      2. Hyperlipidemia, unspecified hyperlipidemia type        3. Left bundle-branch block        4. Nonischemic cardiomyopathy (HCC)  Echo complete w/ contrast if indicated      5. End-stage COPD with acute exacerbation (HCC)        6. Chronic respiratory failure with hypoxia and hypercapnia (HCC)              DISCUSSION:     1.  Chronic hypoxic respiratory failure - This appears to be initially more pulmonary related, as his PFTs do show a significant obstructive pathology and his ejection fraction has improved.  He continues on home oxygen with 4 to 6 L chronically, with oxygen saturations in the high 80s.  Heart failure is now contributing to this as well given his severely reduced systolic function.  His overall long-term prognosis is poor, and pulmonary has had discussions with him regarding palliative care and hospice.  I discussed with him and his family today the appropriateness of palliative care and hospice, but he did not care to discuss or entertain it at this point.     2.  Chronic systolic CHF - This is secondary to a dilated cardiomyopathy and a left bundle branch block.  He is volume overloaded today and was not responding to Lasix.  We changed him to torsemide which she did respond to, but he does need periodic increases.  He is currently on 40 mg daily.  He should follow a low-sodium diet.  I am can have him back for follow-up with the heart failure service in 1 to 2 months.  His stage is difficult given his chronic respiratory failure associated with COPD.  But I am suspicious he is approaching stage D.     3.  Dilated cardiomyopathy - Ejection fractions have hovered around 40% for a  few years, but a recent hospitalization showed a drop down to 25 to 30%.  His most recent echocardiogram from May showed a drop down to 20%.  We have tried to goal-directed medical therapy but he has not tolerated this from either an acute kidney injury standpoint or hypotension.  We are very limited with this therapy and we will continue to follow him closely to see if we could really add any of these agents.  We did order an updated echocardiogram today.     4.  CAD - Nonobstructive by cardiac catheterization.  Continue medical management.  Patient is on rosuvastatin and ASA.        Interval History:    Natalio Hartmann Jr. is here for follow-up given his history of a dilated cardiomyopathy and chronic systolic congestive heart failure, at least partially associated with a chronic left bundle branch block.  Over the few years his ejection fraction has been hovering around 40%.  He has had, however, significant lung disease, and follows with Dr. Malia Luque of pulmonary.  Most of his symptoms appeared to be pulmonary related, but given his cardiomyopathy and the symptoms we entertained the idea of a biventricular ICD.  He declined, but I also felt it would provide little clinical benefit.  He follows with pulmonary closely who has been titrating steroid therapy for his lung disease.  Over multiple follow-ups he was not able to be on any goal-directed medical therapy because of some hypotension and his chronic lung disease.  We repeated an echocardiogram and his ejection fraction did improve to the low normal range transiently, at 50%.    In August 2022 Sathya was in the hospital with acute respiratory failure that appeared to be the combination of a COPD exacerbation and mild CHF.  He had an echocardiogram that again showed an ejection fraction around 40%.  He was started on Lasix 40 mg daily as he had not been on diuretics prior.  In follow-up goal-directed medical therapy was attempted with losartan 25 mg daily and  spironolactone 12.5 mg daily.  However he developed hypovolemia, acute kidney injury and hypotension.  These agents were stopped and his Lasix was made every other day.  That in February of this year Natalio was in the hospital with acute respiratory failure associated with pneumonia and a COPD exacerbation.  He did have some heart failure as well and echocardiogram showed a drop in his ejection fraction to 25%, likely stress-induced.  Later that month he repeated an echocardiogram that showed marginal improvement to 30%.  He had an extensive hospitalization and short-term rehabilitation.  Pulmonary recommended consideration for hospice given his severe COPD and respiratory failure.  He continues to follow with pulmonary closely.    After I last saw him we repeated an echocardiogram in May that showed his ejection fraction back down to 20%.  I last saw him in April but he has been following closely with our heart failure service.  At our last visit his legs were quite tight, and he was not responding to Lasix.  We changed him over to torsemide 20 mg daily.  His torsemide does transiently get increased when he shows more signs of volume overload and he is currently on 40 mg daily.  He follows with pulmonary closely for his end-stage COPD and chronic lung disease.  He is on 4 to 6 L of oxygen at home chronically with saturations around 88%, and noninvasive ventilation.  We have not been able to titrate medical therapy given low blood pressures and the need for diuretics.    His edema did improve on torsemide but it is still present and fluctuates.  He has chronic shortness of breath with the minimal amount of exertion, but also has shortness of breath at rest.  It is difficult to stage his heart failure because of his chronic lung disease but he appears to be end-stage from both a lung and cardiac standpoint.  He denies any chest pains or symptoms of angina..  He denies lightheadedness or any syncope.  No  palpitations.         Patient Active Problem List   Diagnosis    Nonobstructive atherosclerosis of coronary artery    Nonischemic cardiomyopathy (HCC)    Left bundle-branch block    Obstructive sleep apnea    GERD (gastroesophageal reflux disease)    Prediabetes    Osteoarthritis    Osteoporosis    Vitamin D deficiency    Mood disorder (HCC)    Other insomnia    Macrocytosis without anemia    Chronic respiratory failure with hypoxia and hypercapnia (HCC)    Goals of care, counseling/discussion    BPH with obstruction/lower urinary tract symptoms    Prostate cancer (HCC)    Pulmonary nodules/lesions, multiple    Protein-calorie malnutrition (HCC)    Chronic HFrEF (heart failure with reduced ejection fraction)     Chronic anemia    SIRS (systemic inflammatory response syndrome)    Chronic hyponatremia    Physical deconditioning    Steroid-induced hyperglycemia    Hyperlipidemia    Malnutrition (HCC)    Alkalosis    Palliative care patient    End-stage COPD with acute exacerbation (HCC)    Chronic hypercapnia/KEVIN    Metabolic encephalopathy    Pulmonary cachexia due to COPD (HCC)    Elevated troponin    History of bladder cancer    Severe protein-calorie malnutrition (HCC)    Dependence on respirator (ventilator) status (HCC)     Past Medical History:   Diagnosis Date    Acute metabolic encephalopathy 03/29/2022    Arthritis     Bladder mass     Cardiomyopathy (HCC)     Chest pain     COPD (chronic obstructive pulmonary disease) (HCC)     COVID-19 virus infection 02/23/2023    CPAP (continuous positive airway pressure) dependence     Emphysema of lung (HCC)     Hypoxia     nocturnal    Left bundle branch block     Multiple pulmonary nodules     last assessed: 10/12/16    Pneumonia     Sleep apnea, obstructive     Smoker     Weight loss 08/29/2019     Social History     Socioeconomic History    Marital status: /Civil Union     Spouse name: Not on file    Number of children: Not on file    Years of education: Not  on file    Highest education level: Not on file   Occupational History    Not on file   Tobacco Use    Smoking status: Former     Current packs/day: 0.00     Average packs/day: 2.5 packs/day for 42.0 years (105.0 ttl pk-yrs)     Types: Cigarettes     Start date:      Quit date: 2012     Years since quittin.0    Smokeless tobacco: Former    Tobacco comments:     1 ppd for 37 years, 2010 down to 5 cigs a day, is around second hand smoke   Vaping Use    Vaping status: Never Used   Substance and Sexual Activity    Alcohol use: Not Currently     Comment: rarely    Drug use: No    Sexual activity: Not Currently     Partners: Female   Other Topics Concern    Not on file   Social History Narrative    Daily coffee consumption: 8 or more cups a day        Used to work in [employer].  On disability.     Social Determinants of Health     Financial Resource Strain: Not on file   Food Insecurity: No Food Insecurity (2023)    Hunger Vital Sign     Worried About Running Out of Food in the Last Year: Never true     Ran Out of Food in the Last Year: Never true   Transportation Needs: No Transportation Needs (2023)    PRAPARE - Transportation     Lack of Transportation (Medical): No     Lack of Transportation (Non-Medical): No   Physical Activity: Not on file   Stress: Not on file   Social Connections: Not on file   Intimate Partner Violence: Not on file   Housing Stability: Low Risk  (2023)    Housing Stability Vital Sign     Unable to Pay for Housing in the Last Year: No     Number of Places Lived in the Last Year: 1     Unstable Housing in the Last Year: No      Family History   Problem Relation Age of Onset    Diabetes Mother      Past Surgical History:   Procedure Laterality Date    COLONOSCOPY      CYSTOSCOPY      CYSTOSCOPY  2021    IA BLADDER INSTILLATION ANTICARCINOGENIC AGENT N/A 1/3/2020    Procedure: INSTILLATION MITOMYCIN;  Surgeon: Sundeep Canchola MD;  Location: [facility];   Service: Urology    AR CYSTO W/REMOVAL OF LESIONS SMALL N/A 1/3/2020    Procedure: TRANSURETHRAL RESECTION OF BLADDER TUMOR (TURBT);  Surgeon: Sundeep Canchola MD;  Location: [facility];  Service: Urology    AR CYSTOURETHROSCOPY WITH BIOPSY N/A 4/13/2021    Procedure: CYSTOSCOPY WITH BIOPSIES;  Surgeon: Sundeep Canchola MD;  Location: [facility];  Service: Urology    AR TRURL ELECTROSURG RESCJ PROSTATE BLEED COMPLETE N/A 4/13/2021    Procedure: TRANSURETHRAL RESECTION OF PROSTATE (TURP) BLADDER BIOPSY, FULGURATION;  Surgeon: Sundeep Canchola MD;  Location: [facility];  Service: Urology       Current Outpatient Medications:     albuterol (PROVENTIL HFA,VENTOLIN HFA) 90 mcg/act inhaler, Inhale 2 puffs every 4 (four) hours as needed for wheezing or shortness of breath, Disp: 18 g, Rfl: 5    budesonide (Pulmicort) 1 MG/2ML nebulizer solution, Take 2 mL (1 mg total) by nebulization daily Rinse mouth after use., Disp: 120 mL, Rfl: 5    ergocalciferol (VITAMIN D2) 50,000 units, , Disp: , Rfl:     formoterol (PERFOROMIST) 20 MCG/2ML nebulizer solution, TAKE 2 ML (20 MCG TOTAL) BY NEBULIZATION 2 (TWO) TIMES A DAY, Disp: 120 mL, Rfl: 5    ipratropium-albuterol (DUO-NEB) 0.5-2.5 mg/3 mL nebulizer solution, Take 3 mL by nebulization 4 (four) times a day, Disp: 360 mL, Rfl: 11    Melatonin 5 MG TABS, Take 1 tablet by mouth daily at bedtime, Disp: , Rfl:     predniSONE 5 mg tablet, Take 1 tablet (5 mg total) by mouth daily Resume prednisone 5mg daily when taper is completed, Disp: 30 tablet, Rfl: 0    rosuvastatin (CRESTOR) 10 MG tablet, Take 1 tablet (10 mg total) by mouth daily, Disp: 90 tablet, Rfl: 3    sodium chloride (OCEAN) 0.65 % nasal spray, 1 spray into each nostril every hour as needed for congestion, Disp: 30 mL, Rfl: 0    torsemide (DEMADEX) 10 mg tablet, TAKE 4 TABLETS BY MOUTH DAILY., Disp: 360 tablet, Rfl: 2    cyanocobalamin (VITAMIN B-12) 1000 MCG tablet, Take 1 tablet (1,000 mcg total) by mouth daily  (Patient not taking: Reported on 1/22/2024), Disp: 30 tablet, Rfl: 0    Diclofenac Sodium (VOLTAREN) 1 %, APPLY 2 GRAMS 4 TIMES A DAY, Disp: , Rfl:     Insulin Pen Needle (BD Pen Needle Cathie 2nd Gen) 32G X 4 MM MISC, For use with insulin pen. Pharmacy may dispense brand covered by insurance. (Patient not taking: Reported on 10/11/2023), Disp: 100 each, Rfl: 0    insuln lispro (HumaLOG KwikPen) 200 units/mL CONCENTRATED U-200 injection pen, Inject 3 Units under the skin 3 (three) times a day with meals (Patient not taking: Reported on 10/11/2023), Disp: 6 mL, Rfl: 0    NovoLOG FlexPen 100 units/mL injection pen, INJECT 3 UNITS UDNER THE SKIN 3 TIMES A DAY WITH MEALS (Patient not taking: Reported on 9/19/2023), Disp: , Rfl:     OneTouch Delica Lancets 33G MISC, May substitute brand based on insurance coverage. Check glucose TID. (Patient not taking: Reported on 10/11/2023), Disp: 100 each, Rfl: 0    sodium chloride 1 g tablet, Take 1 tablet (1 g total) by mouth 2 (two) times a day with meals (Patient not taking: Reported on 1/22/2024), Disp: 60 tablet, Rfl: 0    tamsulosin (FLOMAX) 0.4 mg, Take 1 capsule (0.4 mg total) by mouth daily with dinner (Patient not taking: Reported on 9/19/2023), Disp: , Rfl: 0  No Known Allergies    Labs:  Lab Results   Component Value Date     08/17/2015    K 3.4 (L) 01/10/2024    K 3.9 08/17/2015    CL 86 (L) 01/10/2024     (H) 08/17/2015    CO2 44 (H) 01/10/2024    CO2 30 08/17/2015    BUN 14 01/10/2024    BUN 13 08/17/2015    CREATININE 0.94 01/10/2024    CREATININE 1.04 08/17/2015    GLUCOSE 90 08/17/2015    CALCIUM 9.3 01/10/2024    CALCIUM 8.8 08/17/2015     Imaging:  ECG today reveals sinus rhythm with an incomplete left bundle branch block.    ECHO (2/21/2023):    Left Ventricle: Left ventricular cavity size is dilated (LVIDd of 6.6 cm).. Wall thickness is normal. The left ventricular ejection fraction is 30%. Systolic function is severely reduced. There is severe  "global hypokinesis. Diastolic function is mildly abnormal, consistent with grade I (abnormal) relaxation.    Right Ventricle: Right ventricular cavity size is normal. Systolic function is normal.    Tricuspid Valve: There is mild regurgitation.      Review of Systems:  Review of Systems   Constitutional:  Positive for fatigue.   HENT: Negative.     Eyes: Negative.    Respiratory:  Positive for shortness of breath.    Cardiovascular:  Positive for palpitations.   Gastrointestinal: Negative.    Musculoskeletal: Negative.    Skin: Negative.    Allergic/Immunologic: Negative.    Neurological: Negative.    Hematological: Negative.    Psychiatric/Behavioral: Negative.     All other systems reviewed and are negative.    Vitals:    01/22/24 1319   BP: 100/58   BP Location: Right arm   Patient Position: Sitting   Cuff Size: Child   Pulse: 94   SpO2: 98%   Weight: 46.7 kg (103 lb)   Height: 5' 2\" (1.575 m)     Physical Exam  Vitals and nursing note reviewed.   Constitutional:       Appearance: He is well-developed.   HENT:      Head: Normocephalic and atraumatic.   Eyes:      General: No scleral icterus.        Right eye: No discharge.         Left eye: No discharge.      Pupils: Pupils are equal, round, and reactive to light.   Neck:      Thyroid: No thyromegaly.      Vascular: No JVD.   Cardiovascular:      Rate and Rhythm: Normal rate and regular rhythm. No extrasystoles are present.     Pulses: Normal pulses.      Heart sounds: S1 normal and S2 normal. Heart sounds are distant. No murmur heard.     No friction rub. No gallop.   Pulmonary:      Effort: Pulmonary effort is normal. No respiratory distress.      Breath sounds: Decreased breath sounds present. No wheezing or rales.   Abdominal:      General: Bowel sounds are normal. There is no distension.      Palpations: Abdomen is soft.      Tenderness: There is no abdominal tenderness.   Musculoskeletal:         General: No tenderness or deformity. Normal range of motion. "      Cervical back: Normal range of motion and neck supple.   Skin:     General: Skin is warm and dry.      Findings: No rash.   Neurological:      Mental Status: He is alert and oriented to person, place, and time.      Cranial Nerves: No cranial nerve deficit.   Psychiatric:         Thought Content: Thought content normal.         Judgment: Judgment normal.       Counseling / Coordination of Care  Total office time spent today 25 minutes.  Greater than 50% of total time was spent with the patient and / or family counseling and / or coordination of care.

## 2024-01-27 ENCOUNTER — APPOINTMENT (EMERGENCY)
Dept: RADIOLOGY | Facility: HOSPITAL | Age: 69
DRG: 189 | End: 2024-01-27
Payer: MEDICARE

## 2024-01-27 ENCOUNTER — HOSPITAL ENCOUNTER (INPATIENT)
Facility: HOSPITAL | Age: 69
LOS: 8 days | Discharge: HOME | DRG: 189 | End: 2024-02-04
Attending: EMERGENCY MEDICINE | Admitting: STUDENT IN AN ORGANIZED HEALTH CARE EDUCATION/TRAINING PROGRAM
Payer: MEDICARE

## 2024-01-27 DIAGNOSIS — E43 SEVERE PROTEIN-CALORIE MALNUTRITION (HCC): ICD-10-CM

## 2024-01-27 DIAGNOSIS — J44.1 COPD EXACERBATION (HCC): ICD-10-CM

## 2024-01-27 DIAGNOSIS — J18.9 RML PNEUMONIA: ICD-10-CM

## 2024-01-27 DIAGNOSIS — J44.1 COPD WITH ACUTE EXACERBATION (HCC): Primary | ICD-10-CM

## 2024-01-27 DIAGNOSIS — J43.9 EMPHYSEMA, UNSPECIFIED (HCC): ICD-10-CM

## 2024-01-27 PROBLEM — J96.21 ACUTE ON CHRONIC RESPIRATORY FAILURE WITH HYPOXIA AND HYPERCAPNIA (HCC): Status: ACTIVE | Noted: 2020-11-19

## 2024-01-27 LAB
2HR DELTA HS TROPONIN: -12 NG/L
4HR DELTA HS TROPONIN: -8 NG/L
ANION GAP SERPL CALCULATED.3IONS-SCNC: 7 MMOL/L
ATRIAL RATE: 114 BPM
ATRIAL RATE: 117 BPM
ATRIAL RATE: 163 BPM
BASE EX.OXY STD BLDV CALC-SCNC: 39.2 % (ref 60–80)
BASE EX.OXY STD BLDV CALC-SCNC: 76.4 % (ref 60–80)
BASE EX.OXY STD BLDV CALC-SCNC: 89.7 % (ref 60–80)
BASE EXCESS BLDV CALC-SCNC: 10.2 MMOL/L
BASE EXCESS BLDV CALC-SCNC: 10.5 MMOL/L
BASE EXCESS BLDV CALC-SCNC: 14.2 MMOL/L
BASOPHILS # BLD AUTO: 0.06 THOUSANDS/ÂΜL (ref 0–0.1)
BASOPHILS NFR BLD AUTO: 0 % (ref 0–1)
BNP SERPL-MCNC: 380 PG/ML (ref 0–100)
BUN SERPL-MCNC: 23 MG/DL (ref 5–25)
CALCIUM SERPL-MCNC: 9.4 MG/DL (ref 8.4–10.2)
CARDIAC TROPONIN I PNL SERPL HS: 41 NG/L (ref 8–18)
CARDIAC TROPONIN I PNL SERPL HS: 49 NG/L
CARDIAC TROPONIN I PNL SERPL HS: 53 NG/L
CARDIAC TROPONIN I PNL SERPL HS: 61 NG/L
CHLORIDE SERPL-SCNC: 85 MMOL/L (ref 96–108)
CO2 SERPL-SCNC: 41 MMOL/L (ref 21–32)
CREAT SERPL-MCNC: 1.06 MG/DL (ref 0.6–1.3)
EOSINOPHIL # BLD AUTO: 0.07 THOUSAND/ÂΜL (ref 0–0.61)
EOSINOPHIL NFR BLD AUTO: 0 % (ref 0–6)
ERYTHROCYTE [DISTWIDTH] IN BLOOD BY AUTOMATED COUNT: 13.1 % (ref 11.6–15.1)
FLUAV RNA RESP QL NAA+PROBE: NEGATIVE
FLUBV RNA RESP QL NAA+PROBE: NEGATIVE
GFR SERPL CREATININE-BSD FRML MDRD: 72 ML/MIN/1.73SQ M
GLUCOSE SERPL-MCNC: 120 MG/DL (ref 65–140)
HCO3 BLDV-SCNC: 39.6 MMOL/L (ref 24–30)
HCO3 BLDV-SCNC: 39.9 MMOL/L (ref 24–30)
HCO3 BLDV-SCNC: 43.6 MMOL/L (ref 24–30)
HCT VFR BLD AUTO: 35.2 % (ref 36.5–49.3)
HGB BLD-MCNC: 10.7 G/DL (ref 12–17)
IMM GRANULOCYTES # BLD AUTO: 0.13 THOUSAND/UL (ref 0–0.2)
IMM GRANULOCYTES NFR BLD AUTO: 1 % (ref 0–2)
LYMPHOCYTES # BLD AUTO: 0.66 THOUSANDS/ÂΜL (ref 0.6–4.47)
LYMPHOCYTES NFR BLD AUTO: 4 % (ref 14–44)
MCH RBC QN AUTO: 29.5 PG (ref 26.8–34.3)
MCHC RBC AUTO-ENTMCNC: 30.4 G/DL (ref 31.4–37.4)
MCV RBC AUTO: 97 FL (ref 82–98)
MONOCYTES # BLD AUTO: 1.59 THOUSAND/ÂΜL (ref 0.17–1.22)
MONOCYTES NFR BLD AUTO: 9 % (ref 4–12)
NEUTROPHILS # BLD AUTO: 14.45 THOUSANDS/ÂΜL (ref 1.85–7.62)
NEUTS SEG NFR BLD AUTO: 86 % (ref 43–75)
NRBC BLD AUTO-RTO: 0 /100 WBCS
O2 CT BLDV-SCNC: 11.6 ML/DL
O2 CT BLDV-SCNC: 13.1 ML/DL
O2 CT BLDV-SCNC: 6.6 ML/DL
P AXIS: 81 DEGREES
P AXIS: 82 DEGREES
PCO2 BLDV: 85.1 MM HG (ref 42–50)
PCO2 BLDV: 86.1 MM HG (ref 42–50)
PCO2 BLDV: 88.6 MM HG (ref 42–50)
PH BLDV: 7.28 [PH] (ref 7.3–7.4)
PH BLDV: 7.29 [PH] (ref 7.3–7.4)
PH BLDV: 7.31 [PH] (ref 7.3–7.4)
PLATELET # BLD AUTO: 270 THOUSANDS/UL (ref 149–390)
PMV BLD AUTO: 9.4 FL (ref 8.9–12.7)
PO2 BLDV: 27.8 MM HG (ref 35–45)
PO2 BLDV: 47.2 MM HG (ref 35–45)
PO2 BLDV: 69.6 MM HG (ref 35–45)
POTASSIUM SERPL-SCNC: 4.2 MMOL/L (ref 3.5–5.3)
PR INTERVAL: 124 MS
PR INTERVAL: 148 MS
PROCALCITONIN SERPL-MCNC: 0.16 NG/ML
QRS AXIS: 56 DEGREES
QRS AXIS: 56 DEGREES
QRS AXIS: 61 DEGREES
QRSD INTERVAL: 130 MS
QRSD INTERVAL: 130 MS
QRSD INTERVAL: 132 MS
QT INTERVAL: 322 MS
QT INTERVAL: 326 MS
QT INTERVAL: 328 MS
QTC INTERVAL: 452 MS
QTC INTERVAL: 452 MS
QTC INTERVAL: 453 MS
RBC # BLD AUTO: 3.63 MILLION/UL (ref 3.88–5.62)
RSV RNA RESP QL NAA+PROBE: NEGATIVE
SARS-COV-2 RNA RESP QL NAA+PROBE: NEGATIVE
SODIUM SERPL-SCNC: 133 MMOL/L (ref 135–147)
T WAVE AXIS: 198 DEGREES
T WAVE AXIS: 200 DEGREES
T WAVE AXIS: 203 DEGREES
VENTRICULAR RATE: 114 BPM
VENTRICULAR RATE: 116 BPM
VENTRICULAR RATE: 119 BPM
WBC # BLD AUTO: 16.96 THOUSAND/UL (ref 4.31–10.16)

## 2024-01-27 PROCEDURE — 94660 CPAP INITIATION&MGMT: CPT

## 2024-01-27 PROCEDURE — 94640 AIRWAY INHALATION TREATMENT: CPT

## 2024-01-27 PROCEDURE — 82805 BLOOD GASES W/O2 SATURATION: CPT

## 2024-01-27 PROCEDURE — 94002 VENT MGMT INPAT INIT DAY: CPT

## 2024-01-27 PROCEDURE — 87449 NOS EACH ORGANISM AG IA: CPT

## 2024-01-27 PROCEDURE — 80048 BASIC METABOLIC PNL TOTAL CA: CPT

## 2024-01-27 PROCEDURE — 99223 1ST HOSP IP/OBS HIGH 75: CPT | Performed by: STUDENT IN AN ORGANIZED HEALTH CARE EDUCATION/TRAINING PROGRAM

## 2024-01-27 PROCEDURE — 84145 PROCALCITONIN (PCT): CPT | Performed by: STUDENT IN AN ORGANIZED HEALTH CARE EDUCATION/TRAINING PROGRAM

## 2024-01-27 PROCEDURE — 96367 TX/PROPH/DG ADDL SEQ IV INF: CPT

## 2024-01-27 PROCEDURE — 82805 BLOOD GASES W/O2 SATURATION: CPT | Performed by: STUDENT IN AN ORGANIZED HEALTH CARE EDUCATION/TRAINING PROGRAM

## 2024-01-27 PROCEDURE — 94760 N-INVAS EAR/PLS OXIMETRY 1: CPT

## 2024-01-27 PROCEDURE — 84484 ASSAY OF TROPONIN QUANT: CPT

## 2024-01-27 PROCEDURE — 84484 ASSAY OF TROPONIN QUANT: CPT | Performed by: STUDENT IN AN ORGANIZED HEALTH CARE EDUCATION/TRAINING PROGRAM

## 2024-01-27 PROCEDURE — 99291 CRITICAL CARE FIRST HOUR: CPT | Performed by: EMERGENCY MEDICINE

## 2024-01-27 PROCEDURE — 94664 DEMO&/EVAL PT USE INHALER: CPT

## 2024-01-27 PROCEDURE — 94644 CONT INHLJ TX 1ST HOUR: CPT

## 2024-01-27 PROCEDURE — 87040 BLOOD CULTURE FOR BACTERIA: CPT | Performed by: STUDENT IN AN ORGANIZED HEALTH CARE EDUCATION/TRAINING PROGRAM

## 2024-01-27 PROCEDURE — 0241U HB NFCT DS VIR RESP RNA 4 TRGT: CPT | Performed by: STUDENT IN AN ORGANIZED HEALTH CARE EDUCATION/TRAINING PROGRAM

## 2024-01-27 PROCEDURE — 36415 COLL VENOUS BLD VENIPUNCTURE: CPT

## 2024-01-27 PROCEDURE — 83880 ASSAY OF NATRIURETIC PEPTIDE: CPT

## 2024-01-27 PROCEDURE — 96365 THER/PROPH/DIAG IV INF INIT: CPT

## 2024-01-27 PROCEDURE — 71045 X-RAY EXAM CHEST 1 VIEW: CPT

## 2024-01-27 PROCEDURE — 99285 EMERGENCY DEPT VISIT HI MDM: CPT

## 2024-01-27 PROCEDURE — 99223 1ST HOSP IP/OBS HIGH 75: CPT | Performed by: INTERNAL MEDICINE

## 2024-01-27 PROCEDURE — 93005 ELECTROCARDIOGRAM TRACING: CPT

## 2024-01-27 PROCEDURE — 85025 COMPLETE CBC W/AUTO DIFF WBC: CPT

## 2024-01-27 RX ORDER — ALBUTEROL SULFATE 2.5 MG/3ML
2 SOLUTION RESPIRATORY (INHALATION) ONCE
Status: COMPLETED | OUTPATIENT
Start: 2024-01-27 | End: 2024-01-27

## 2024-01-27 RX ORDER — ALBUMIN, HUMAN INJ 5% 5 %
25 SOLUTION INTRAVENOUS ONCE
Status: COMPLETED | OUTPATIENT
Start: 2024-01-27 | End: 2024-01-27

## 2024-01-27 RX ORDER — BUDESONIDE 0.5 MG/2ML
0.5 INHALANT ORAL
Status: DISCONTINUED | OUTPATIENT
Start: 2024-01-27 | End: 2024-01-27

## 2024-01-27 RX ORDER — SODIUM CHLORIDE FOR INHALATION 0.9 %
12 VIAL, NEBULIZER (ML) INHALATION ONCE
Status: COMPLETED | OUTPATIENT
Start: 2024-01-27 | End: 2024-01-27

## 2024-01-27 RX ORDER — LANOLIN ALCOHOL/MO/W.PET/CERES
6 CREAM (GRAM) TOPICAL
Status: DISCONTINUED | OUTPATIENT
Start: 2024-01-27 | End: 2024-02-04 | Stop reason: HOSPADM

## 2024-01-27 RX ORDER — METHYLPREDNISOLONE SODIUM SUCCINATE 40 MG/ML
40 INJECTION, POWDER, LYOPHILIZED, FOR SOLUTION INTRAMUSCULAR; INTRAVENOUS EVERY 8 HOURS
Status: DISCONTINUED | OUTPATIENT
Start: 2024-01-28 | End: 2024-01-29

## 2024-01-27 RX ORDER — LEVALBUTEROL INHALATION SOLUTION 1.25 MG/3ML
1.25 SOLUTION RESPIRATORY (INHALATION)
Status: DISCONTINUED | OUTPATIENT
Start: 2024-01-27 | End: 2024-01-27

## 2024-01-27 RX ORDER — CHLORHEXIDINE GLUCONATE ORAL RINSE 1.2 MG/ML
15 SOLUTION DENTAL EVERY 12 HOURS SCHEDULED
Status: DISCONTINUED | OUTPATIENT
Start: 2024-01-27 | End: 2024-02-04 | Stop reason: HOSPADM

## 2024-01-27 RX ORDER — IPRATROPIUM BROMIDE AND ALBUTEROL SULFATE 2.5; .5 MG/3ML; MG/3ML
3 SOLUTION RESPIRATORY (INHALATION) 4 TIMES DAILY
Status: DISCONTINUED | OUTPATIENT
Start: 2024-01-27 | End: 2024-01-27

## 2024-01-27 RX ORDER — SODIUM CHLORIDE FOR INHALATION 0.9 %
3 VIAL, NEBULIZER (ML) INHALATION
Status: DISCONTINUED | OUTPATIENT
Start: 2024-01-27 | End: 2024-01-27

## 2024-01-27 RX ORDER — IPRATROPIUM BROMIDE AND ALBUTEROL SULFATE .5; 3 MG/3ML; MG/3ML
1 SOLUTION RESPIRATORY (INHALATION) ONCE
Status: COMPLETED | OUTPATIENT
Start: 2024-01-27 | End: 2024-01-27

## 2024-01-27 RX ORDER — LEVALBUTEROL INHALATION SOLUTION 1.25 MG/3ML
1.25 SOLUTION RESPIRATORY (INHALATION)
Status: DISCONTINUED | OUTPATIENT
Start: 2024-01-27 | End: 2024-01-30

## 2024-01-27 RX ORDER — ECHINACEA PURPUREA EXTRACT 125 MG
1 TABLET ORAL
Status: DISCONTINUED | OUTPATIENT
Start: 2024-01-27 | End: 2024-02-04 | Stop reason: HOSPADM

## 2024-01-27 RX ORDER — FORMOTEROL FUMARATE DIHYDRATE 20 UG/2ML
20 SOLUTION RESPIRATORY (INHALATION)
Status: DISCONTINUED | OUTPATIENT
Start: 2024-01-27 | End: 2024-01-27

## 2024-01-27 RX ORDER — ERGOCALCIFEROL 1.25 MG/1
50000 CAPSULE ORAL WEEKLY
Status: DISCONTINUED | OUTPATIENT
Start: 2024-01-27 | End: 2024-02-04 | Stop reason: HOSPADM

## 2024-01-27 RX ORDER — TORSEMIDE 20 MG/1
40 TABLET ORAL DAILY
Status: DISCONTINUED | OUTPATIENT
Start: 2024-01-27 | End: 2024-01-29

## 2024-01-27 RX ORDER — PRAVASTATIN SODIUM 80 MG/1
80 TABLET ORAL
Status: DISCONTINUED | OUTPATIENT
Start: 2024-01-27 | End: 2024-02-04 | Stop reason: HOSPADM

## 2024-01-27 RX ORDER — ENOXAPARIN SODIUM 100 MG/ML
40 INJECTION SUBCUTANEOUS DAILY
Status: DISCONTINUED | OUTPATIENT
Start: 2024-01-27 | End: 2024-01-29

## 2024-01-27 RX ORDER — MAGNESIUM SULFATE HEPTAHYDRATE 40 MG/ML
1 INJECTION, SOLUTION INTRAVENOUS ONCE
Status: COMPLETED | OUTPATIENT
Start: 2024-01-27 | End: 2024-01-27

## 2024-01-27 RX ORDER — FORMOTEROL FUMARATE DIHYDRATE 20 UG/2ML
20 SOLUTION RESPIRATORY (INHALATION)
Status: DISCONTINUED | OUTPATIENT
Start: 2024-01-27 | End: 2024-01-29

## 2024-01-27 RX ORDER — METHYLPREDNISOLONE SODIUM SUCCINATE 40 MG/ML
40 INJECTION, POWDER, LYOPHILIZED, FOR SOLUTION INTRAMUSCULAR; INTRAVENOUS EVERY 8 HOURS
Status: DISCONTINUED | OUTPATIENT
Start: 2024-01-27 | End: 2024-01-27

## 2024-01-27 RX ORDER — BUDESONIDE 0.5 MG/2ML
1 INHALANT ORAL DAILY
Status: DISCONTINUED | OUTPATIENT
Start: 2024-01-27 | End: 2024-01-29

## 2024-01-27 RX ORDER — METHYLPREDNISOLONE SOD SUCC 125 MG
1 VIAL (EA) INJECTION ONCE
Status: COMPLETED | OUTPATIENT
Start: 2024-01-27 | End: 2024-01-27

## 2024-01-27 RX ADMIN — LEVALBUTEROL HYDROCHLORIDE 1.25 MG: 1.25 SOLUTION RESPIRATORY (INHALATION) at 15:24

## 2024-01-27 RX ADMIN — CHLORHEXIDINE GLUCONATE 15 ML: 1.2 SOLUTION ORAL at 21:09

## 2024-01-27 RX ADMIN — ALBUTEROL SULFATE 10 MG: 2.5 SOLUTION RESPIRATORY (INHALATION) at 08:00

## 2024-01-27 RX ADMIN — MELATONIN 6 MG: at 21:09

## 2024-01-27 RX ADMIN — IPRATROPIUM BROMIDE 1 MG: 0.5 SOLUTION RESPIRATORY (INHALATION) at 08:00

## 2024-01-27 RX ADMIN — Medication 12 ML: at 08:00

## 2024-01-27 RX ADMIN — SODIUM CHLORIDE 1000 ML: 0.9 INJECTION, SOLUTION INTRAVENOUS at 08:26

## 2024-01-27 RX ADMIN — CEFTRIAXONE SODIUM 1000 MG: 10 INJECTION, POWDER, FOR SOLUTION INTRAVENOUS at 07:46

## 2024-01-27 RX ADMIN — AZITHROMYCIN MONOHYDRATE 500 MG: 500 INJECTION, POWDER, LYOPHILIZED, FOR SOLUTION INTRAVENOUS at 08:26

## 2024-01-27 RX ADMIN — ALBUMIN (HUMAN) 25 G: 12.5 INJECTION, SOLUTION INTRAVENOUS at 15:54

## 2024-01-27 RX ADMIN — IPRATROPIUM BROMIDE 0.5 MG: 0.5 SOLUTION RESPIRATORY (INHALATION) at 19:51

## 2024-01-27 RX ADMIN — LEVALBUTEROL HYDROCHLORIDE 1.25 MG: 1.25 SOLUTION RESPIRATORY (INHALATION) at 19:51

## 2024-01-27 RX ADMIN — FORMOTEROL FUMARATE DIHYDRATE 20 MCG: 20 SOLUTION RESPIRATORY (INHALATION) at 19:51

## 2024-01-27 RX ADMIN — ENOXAPARIN SODIUM 40 MG: 40 INJECTION SUBCUTANEOUS at 10:35

## 2024-01-27 RX ADMIN — IPRATROPIUM BROMIDE 0.5 MG: 0.5 SOLUTION RESPIRATORY (INHALATION) at 15:24

## 2024-01-27 RX ADMIN — PRAVASTATIN SODIUM 80 MG: 80 TABLET ORAL at 18:47

## 2024-01-27 NOTE — ED ATTENDING ATTESTATION
1/27/2024  I, Jorge L Easton MD, saw and evaluated the patient. I have discussed the patient with the resident/non-physician practitioner and agree with the resident's/non-physician practitioner's findings, Plan of Care, and MDM as documented in the resident's/non-physician practitioner's note, except where noted. All available labs and Radiology studies were reviewed.  I was present for key portions of any procedure(s) performed by the resident/non-physician practitioner and I was immediately available to provide assistance.       At this point I agree with the current assessment done in the Emergency Department.  I have conducted an independent evaluation of this patient a history and physical is as follows:    68-year-old male with COPD and CHF presents to the emergency department in acute respiratory distress on CPAP.  When EMS initially arrived at his residence, he was saturating 70%, he was placed on CPAP with improvement in O2 saturation, but continue with increased work of breathing.  Has received a total of 2 breathing treatments, 125 Solu-Medrol, and 2 g of magnesium prior to arrival.  No reported fevers.  History limited secondary to acuity of condition.    On exam, patient is in acute respiratory distress on prehospital CPAP, continues to be tachypneic with poor air movement.  Head is normocephalic atraumatic, pupils equal round and reactive to light, neck is supple without meningismus signs, heart is tachycardic, regular rhythm with intact distal pulses.  Abdomen soft, nontender nondistended without rebound or guarding.    Suspect symptoms likely secondary to COPD exacerbation, differential diagnosis also includes but is not limited to arrhythmia, anemia, CHF exacerbation, pneumonia, pneumothorax.  Will give additional breathing treatments, transition to hospital BiPAP, and reassess.  Patient will require admission to the hospital for further management and evaluation.    ED Course         Critical  Care Time  CriticalCare Time    Date/Time: 1/27/2024 7:15 AM    Performed by: Jorge L Easton MD  Authorized by: Jorge L Easton MD    Critical care provider statement:     Critical care time (minutes):  32    Critical care time was exclusive of:  Separately billable procedures and treating other patients and teaching time    Critical care was necessary to treat or prevent imminent or life-threatening deterioration of the following conditions:  Respiratory failure    Critical care was time spent personally by me on the following activities:  Obtaining history from patient or surrogate, development of treatment plan with patient or surrogate, evaluation of patient's response to treatment, examination of patient, ordering and performing treatments and interventions, ordering and review of laboratory studies, ordering and review of radiographic studies, re-evaluation of patient's condition and review of old charts

## 2024-01-27 NOTE — CASE MANAGEMENT
Case Management Assessment & Discharge Planning Note    Patient name Natalio Hartmann Jr.  Location [facility]  MRN [id number]  : 1955 Date 2024       Current Admission Date: 2024  Current Admission Diagnosis:Acute on chronic respiratory failure with hypoxia and hypercapnia (HCC)   Patient Active Problem List    Diagnosis Date Noted    Dependence on respirator (ventilator) status ([facility]) 01/10/2024    Severe protein-calorie malnutrition (HCC) 10/14/2023    Elevated troponin 2023    History of bladder cancer 2023    Pulmonary cachexia due to COPD ([facility])     Chronic hypercapnia/KEVIN 2023    Metabolic encephalopathy 2023    End-stage COPD with acute exacerbation ([facility]) 2023    Palliative care patient 2023    Alkalosis 2023    Malnutrition ([facility]) 06/10/2023    Hyperlipidemia 2023    Steroid-induced hyperglycemia 2023    Physical deconditioning 2023    Chronic anemia 2023    SIRS (systemic inflammatory response syndrome) 2023    Chronic hyponatremia 2023    Chronic HFrEF (heart failure with reduced ejection fraction)  2022    Protein-calorie malnutrition (HCC) 2022    Pulmonary nodules/lesions, multiple 2022    Prostate cancer ([facility]) 2021    BPH with obstruction/lower urinary tract symptoms 2021    Goals of care, counseling/discussion 2021    Acute on chronic respiratory failure with hypoxia and hypercapnia ([facility]) 2020    Macrocytosis without anemia 2019    Other insomnia 2019    GERD (gastroesophageal reflux disease) 2017    Nonobstructive atherosclerosis of coronary artery 2016    Mood disorder ([facility]) 2015    Prediabetes 2015    Osteoporosis 10/14/2014    Vitamin D deficiency 10/14/2014    Nonischemic cardiomyopathy ([facility]) 2014    Left bundle-branch block 2013    Osteoarthritis 2013    Obstructive sleep apnea 2013      LOS  (days): 0  Geometric Mean LOS (GMLOS) (days):   Days to GMLOS:     OBJECTIVE:    Risk of Unplanned Readmission Score: 39.02         Current admission status: Inpatient       Preferred Pharmacy:   [id number] - [place], [place] - [address]  [address] [place]  [place] [address]  Phone: [phone] Fax: [phone]    Primary Care Provider: Fatmata Gustafson DO    Primary Insurance: MEDICARE  Secondary Insurance: BLUE CROSS    ASSESSMENT:  Active Health Care Proxies       Mellisa Hartmann [facility] Care Representative - Spouse   Primary Phone: [phone] (Mobile)  Home Phone: [phone]                                Patient Information  Admitted from:: Home  Mental Status: Alert  During Assessment patient was accompanied by: Not accompanied during assessment  Assessment information provided by:: Spouse  Primary Caregiver: Spouse  Caregiver's Name:: Mellisa Hartmann  Caregiver's Relationship to Patient:: Family Member  Caregiver's Telephone Number:: [phone]  Support Systems: Spouse/significant other, Son  What city do you live in?: [place]  Home entry access options. Select all that apply.: Stairs  Number of steps to enter home.: 3  Do the steps have railings?: Yes  Type of Current Residence: [facility]  Living Arrangements: Lives w/ Spouse/significant other, Lives w/ Son    Activities of Daily Living Prior to Admission  Functional Status: Assistance  Completes ADLs independently?: No  Level of ADL dependence: Assistance  Ambulates independently?: No  Level of ambulatory dependence: Assistance  Does patient use assisted devices?: Yes  Assisted Devices (DME) used: CPAP, Home Oxygen concentrator, Hospital Bed, Portable Oxygen tanks, Shower Chair, Walker, Wheelchair, Other (Comment) ([employer] through [employer])  DME Company Name (respiratory supplies): [employer]  O2 Rate(s): 3-6L  Does patient currently own DME?: Yes  What DME does the patient currently own?: CPAP, Home Oxygen concentrator, Hospital Bed,  Shower Chair, Walker, Wheelchair, Other (Comment) ([employer])  Does patient have a history of Outpatient Therapy (PT/OT)?: No  Does the patient have a history of Short-Term Rehab?: No  Does patient have a history of HHC?: Yes ( HHC)  Does patient currently have HHC?: No         Patient Information Continued  Does patient have prescription coverage?: Yes  Does patient receive dialysis treatments?: No         Means of Transportation  Means of Transport to [facility]:: Family transport      Housing Stability: Low Risk  (1/27/2024)    Housing Stability Vital Sign     Unable to Pay for Housing in the Last Year: No     Number of Places Lived in the Last Year: 1     Unstable Housing in the Last Year: No   Food Insecurity: No Food Insecurity (1/27/2024)    Hunger Vital Sign     Worried About Running Out of Food in the Last Year: Never true     Ran Out of Food in the Last Year: Never true   Transportation Needs: No Transportation Needs (1/27/2024)    PRAPARE - Transportation     Lack of Transportation (Medical): No     Lack of Transportation (Non-Medical): No   Utilities: Not At Risk (1/27/2024)    [facility] Utilities     Threatened with loss of utilities: No       DISCHARGE DETAILS:    Discharge planning discussed with:: Pt spouseMellisa  Freedom of Choice: Yes     CM contacted family/caregiver?: Yes             Contacts  Patient Contacts: Mellisa Hartmann, wife  Relationship to Patient:: Family  Contact Method: Phone  Phone Number: [phone]  Reason/Outcome: Continuity of Care, Emergency Contact, Discharge Planning                                                                     Additional Comments: Initial CM assessment completed with pt spouseMellisa. No questions for CM at this time.

## 2024-01-27 NOTE — ASSESSMENT & PLAN NOTE
Patient is chronically on home oxygen between 4 to 6 L/min  Presented with severe hypoxia and shortness of breath  Placed on BiPAP in ED  Chest x-ray with mild pulmonary edema. Focal opacity in the right midlung which could represent pneumonia.     Patient is afebrile with leukocytosis  Currently requiring continuous BiPAP  Patient is full code  Likely secondary to end-stage COPD exacerbation and possible pneumonia/pulmonary edema

## 2024-01-27 NOTE — ASSESSMENT & PLAN NOTE
Patient with end-stage COPD on chronic hypoxic respiratory failure on home oxygen, chronically on prednisone prednisone 5 mg daily, budesonide/Perforomist twice daily, DuoNebs 4 times daily.   Follow-up with pulmonology as an outpatient  Presented with COPD exacerbation, CO2 retention and respiratory acidosis

## 2024-01-27 NOTE — ED PROVIDER NOTES
History  Chief Complaint   Patient presents with    Shortness of Breath     SOB hx CHF COPD, tripoding at home, 70% upon ems arrival. Placed on CPAP 15L brought to 100%.      HPI    Patient is a 68-year-old male with past medical history of acute metabolic encephalopathy, cardiomyopathy, CHF, COPD, left bundle branch block, presenting to the ED for evaluation of respiratory distress at home. On 4-6L of home oxygen per most recent pulmonology visit. States that difficulty breathing has been increasingly worsening over the past few days, despite his regular COPD treatments.  EMS was called and patient was found tripoding at home, with initial oxygen saturation of 70%.  Patient was placed on CPAP, was given 2 albuterol treatments, 125 Solu-Medrol and 2 g of Mg prior to arrival by EMS with significant improvement of oxygen saturation, as well as work of breathing.  Patient denies fevers, chest pain, abdominal pain, nausea, vomiting, urinary complaints, peripheral edema.    Prior to Admission Medications   Prescriptions Last Dose Informant Patient Reported? Taking?   Diclofenac Sodium (VOLTAREN) 1 %  Spouse/Significant Other Yes No   Sig: APPLY 2 GRAMS 4 TIMES A DAY   Insulin Pen Needle (BD Pen Needle Cathie 2nd Gen) 32G X 4 MM MISC  Spouse/Significant Other No No   Sig: For use with insulin pen. Pharmacy may dispense brand covered by insurance.   Patient not taking: Reported on 10/11/2023   Melatonin 5 MG TABS  Spouse/Significant Other Yes No   Sig: Take 1 tablet by mouth daily at bedtime   NovoLOG FlexPen 100 units/mL injection pen  Spouse/Significant Other Yes No   Sig: INJECT 3 UNITS UDNER THE SKIN 3 TIMES A DAY WITH MEALS   Patient not taking: Reported on 9/19/2023   OneTouch Delica Lancets 33G MISC  Spouse/Significant Other No No   Sig: May substitute brand based on insurance coverage. Check glucose TID.   Patient not taking: Reported on 10/11/2023   albuterol (PROVENTIL HFA,VENTOLIN HFA) 90 mcg/act inhaler   Spouse/Significant Other No No   Sig: Inhale 2 puffs every 4 (four) hours as needed for wheezing or shortness of breath   budesonide (Pulmicort) 1 MG/2ML nebulizer solution  Spouse/Significant Other No No   Sig: Take 2 mL (1 mg total) by nebulization daily Rinse mouth after use.   cyanocobalamin (VITAMIN B-12) 1000 MCG tablet  Spouse/Significant Other No No   Sig: Take 1 tablet (1,000 mcg total) by mouth daily   Patient not taking: Reported on 2024   ergocalciferol (VITAMIN D2) 50,000 units  Spouse/Significant Other Yes No   formoterol (PERFOROMIST) 20 MCG/2ML nebulizer solution  Spouse/Significant Other No No   Sig: TAKE 2 ML (20 MCG TOTAL) BY NEBULIZATION 2 (TWO) TIMES A DAY   insuln lispro (HumaLOG KwikPen) 200 units/mL CONCENTRATED U-200 injection pen  Spouse/Significant Other No No   Sig: Inject 3 Units under the skin 3 (three) times a day with meals   Patient not taking: Reported on 10/11/2023   ipratropium-albuterol (DUO-NEB) 0.5-2.5 mg/3 mL nebulizer solution  Spouse/Significant Other No No   Sig: Take 3 mL by nebulization 4 (four) times a day   predniSONE 5 mg tablet  Spouse/Significant Other No No   Sig: Take 1 tablet (5 mg total) by mouth daily Resume prednisone 5mg daily when taper is completed   rosuvastatin (CRESTOR) 10 MG tablet  Spouse/Significant Other No No   Sig: Take 1 tablet (10 mg total) by mouth daily   sodium chloride (OCEAN) 0.65 % nasal spray  Spouse/Significant Other No No   Si spray into each nostril every hour as needed for congestion   sodium chloride 1 g tablet  Spouse/Significant Other No No   Sig: Take 1 tablet (1 g total) by mouth 2 (two) times a day with meals   Patient not taking: Reported on 2024   tamsulosin (FLOMAX) 0.4 mg  Spouse/Significant Other No No   Sig: Take 1 capsule (0.4 mg total) by mouth daily with dinner   Patient not taking: Reported on 2023   torsemide (DEMADEX) 10 mg tablet  Spouse/Significant Other No No   Sig: TAKE 4 TABLETS BY MOUTH DAILY.       Facility-Administered Medications: None       Past Medical History:   Diagnosis Date    Acute metabolic encephalopathy 03/29/2022    Arthritis     Bladder mass     Cardiomyopathy (HCC)     Chest pain     COPD (chronic obstructive pulmonary disease) (HCC)     COVID-19 virus infection 02/23/2023    CPAP (continuous positive airway pressure) dependence     Emphysema of lung (HCC)     Hypoxia     nocturnal    Left bundle branch block     Multiple pulmonary nodules     last assessed: 10/12/16    Pneumonia     Sleep apnea, obstructive     Smoker     Weight loss 08/29/2019       Past Surgical History:   Procedure Laterality Date    COLONOSCOPY      CYSTOSCOPY      CYSTOSCOPY  02/17/2021    RI BLADDER INSTILLATION ANTICARCINOGENIC AGENT N/A 1/3/2020    Procedure: INSTILLATION MITOMYCIN;  Surgeon: Sundeep Canchola MD;  Location: [facility];  Service: Urology    RI CYSTO W/REMOVAL OF LESIONS SMALL N/A 1/3/2020    Procedure: TRANSURETHRAL RESECTION OF BLADDER TUMOR (TURBT);  Surgeon: Sundeep Canchola MD;  Location: [facility];  Service: Urology    RI CYSTOURETHROSCOPY WITH BIOPSY N/A 4/13/2021    Procedure: CYSTOSCOPY WITH BIOPSIES;  Surgeon: Sundeep Canchola MD;  Location[facility];  Service: Urology    RI TRURL ELECTROSURG RESCJ PROSTATE BLEED COMPLETE N/A 4/13/2021    Procedure: TRANSURETHRAL RESECTION OF PROSTATE (TURP) BLADDER BIOPSY, FULGURATION;  Surgeon: Sundeep Canchola MD;  Location: [facility];  Service: Urology       Family History   Problem Relation Age of Onset    Diabetes Mother      I have reviewed and agree with the history as documented.    E-Cigarette/Vaping    E-Cigarette Use Never User      E-Cigarette/Vaping Substances    Nicotine No     THC No     CBD No     Flavoring No     Other No     Unknown No      Social History     Tobacco Use    Smoking status: Former     Current packs/day: 0.00     Average packs/day: 2.5 packs/day for 42.0 years (105.0 ttl pk-yrs)     Types: Cigarettes     Start date:  1970     Quit date:      Years since quittin.0    Smokeless tobacco: Former    Tobacco comments:     1 ppd for 37 years, 2010 down to 5 cigs a day, is around second hand smoke   Vaping Use    Vaping status: Never Used   Substance Use Topics    Alcohol use: Not Currently     Comment: rarely    Drug use: No        Review of Systems   Unable to perform ROS: Severe respiratory distress       Physical Exam  ED Triage Vitals   Temperature Pulse Respirations Blood Pressure SpO2   24 0644 24 0638 24 0638 24 0638 24 0638   98.7 °F (37.1 °C) (!) 118 (!) 26 118/63 99 %      Temp Source Heart Rate Source Patient Position - Orthostatic VS BP Location FiO2 (%)   24 0644 24 0638 24 1200 24 1200 24 0642   Axillary Monitor Sitting Left arm 70      Pain Score       24 1130       No Pain             Orthostatic Vital Signs  Vitals:    24 1130 24 1200 24 1300 24 1355   BP: 93/60 (!) 87/56 (!) 84/61 (!) 82/61   Pulse: 102 92 100 82   Patient Position - Orthostatic VS:  Sitting         Physical Exam  Vitals and nursing note reviewed.   Constitutional:       General: He is in acute distress (respiratory).      Appearance: He is ill-appearing.      Comments: Cachectic     HENT:      Head: Normocephalic and atraumatic.      Mouth/Throat:      Mouth: Mucous membranes are moist.   Eyes:      Conjunctiva/sclera: Conjunctivae normal.      Pupils: Pupils are equal, round, and reactive to light.   Neck:      Vascular: No JVD.   Cardiovascular:      Rate and Rhythm: Regular rhythm. Tachycardia present.      Pulses: Normal pulses.      Heart sounds: No murmur heard.  Pulmonary:      Effort: Tachypnea and accessory muscle usage present. No respiratory distress.      Breath sounds: Examination of the right-upper field reveals decreased breath sounds and wheezing. Examination of the left-upper field reveals decreased breath sounds and wheezing.  Examination of the right-middle field reveals decreased breath sounds and wheezing. Examination of the left-middle field reveals decreased breath sounds and wheezing. Examination of the right-lower field reveals decreased breath sounds and wheezing. Examination of the left-lower field reveals decreased breath sounds and wheezing. Decreased breath sounds and wheezing present. No rhonchi or rales.   Chest:      Chest wall: No mass or tenderness.   Abdominal:      Palpations: Abdomen is soft.      Tenderness: There is no abdominal tenderness.   Musculoskeletal:         General: No swelling. Normal range of motion.      Cervical back: Neck supple.      Right lower leg: No tenderness. No edema.      Left lower leg: No tenderness. No edema.   Skin:     General: Skin is warm and dry.      Capillary Refill: Capillary refill takes less than 2 seconds.      Findings: No erythema.   Neurological:      General: No focal deficit present.      Mental Status: He is alert and oriented to person, place, and time.   Psychiatric:         Mood and Affect: Mood normal.         ED Medications  Medications   chlorhexidine (PERIDEX) 0.12 % oral rinse 15 mL (0 mL Mouth/Throat Hold 1/27/24 0914)   enoxaparin (LOVENOX) subcutaneous injection 40 mg (40 mg Subcutaneous Given 1/27/24 1035)   formoterol (PERFOROMIST) nebulizer solution 20 mcg ( Nebulization Canceled Entry 1/27/24 0900)   methylPREDNISolone sodium succinate (Solu-MEDROL) injection 40 mg (has no administration in time range)   budesonide (PULMICORT) inhalation solution 1 mg ( Nebulization Canceled Entry 1/27/24 0900)   cyanocobalamin (VITAMIN B-12) tablet 1,000 mcg (0 mcg Oral Hold 1/27/24 0914)   ergocalciferol (VITAMIN D2) capsule 50,000 Units (0 Units Oral Hold 1/27/24 0914)   melatonin tablet 6 mg (has no administration in time range)   pravastatin (PRAVACHOL) tablet 80 mg (has no administration in time range)   sodium chloride (OCEAN) 0.65 % nasal spray 1 spray (has no  administration in time range)   torsemide (DEMADEX) tablet 40 mg (0 mg Oral Hold 1/27/24 0914)   levalbuterol (XOPENEX) inhalation solution 1.25 mg (has no administration in time range)   ipratropium (ATROVENT) 0.02 % inhalation solution 0.5 mg (has no administration in time range)   ceftriaxone (ROCEPHIN) 1 g/50 mL in dextrose IVPB (has no administration in time range)   albuterol inhalation solution 10 mg (10 mg Nebulization Given 1/27/24 0800)   ipratropium (ATROVENT) 0.02 % inhalation solution 1 mg (1 mg Nebulization Given 1/27/24 0800)   sodium chloride 0.9 % inhalation solution 12 mL (12 mL Nebulization Given 1/27/24 0800)   albuterol (FOR EMS ONLY) (2.5 mg/3 mL) 0.083 % inhalation solution 5 mg (0 mg Does not apply Given to EMS 1/27/24 0648)   ipratropium-albuterol (FOR EMS ONLY) (DUO-NEB) 0.5-2.5 mg/3 mL inhalation solution 3 mL (0 mL Does not apply Given to EMS 1/27/24 0648)   methylPREDNISolone sodium succinate (FOR EMS ONLY) (Solu-MEDROL) 125 MG injection 125 mg (0 mg Does not apply Given to EMS 1/27/24 0648)   magnesium sulfate (FOR EMS ONLY) 2 g/50 mL IVPB (premix) 2 g (0 g Does not apply Given to EMS 1/27/24 0648)   ceftriaxone (ROCEPHIN) 1 g/50 mL in dextrose IVPB (0 mg Intravenous Stopped 1/27/24 0820)   azithromycin (ZITHROMAX) 500 mg in sodium chloride 0.9% 250mL IVPB 500 mg (0 mg Intravenous Stopped 1/27/24 0926)   sodium chloride 0.9 % bolus 1,000 mL (0 mL Intravenous Stopped 1/27/24 0926)       Diagnostic Studies  Results Reviewed       Procedure Component Value Units Date/Time    Blood gas, venous [[id number]]  (Abnormal) Collected: 01/27/24 1135    Lab Status: Final result Specimen: Blood from Arm, Left Updated: 01/27/24 1149     pH, Oscar 7.286     pCO2, Oscar 85.1 mm Hg      pO2, Oscar 69.6 mm Hg      HCO3, Oscar 39.6 mmol/L      Base Excess, Oscar 10.5 mmol/L      O2 Content, Oscar 13.1 ml/dL      O2 HGB, VENOUS 89.7 %     HS Troponin I 4hr [[id number]]  (Abnormal) Collected: 01/27/24 1039    Lab Status:  Final result Specimen: Blood from Arm, Right Updated: 01/27/24 1117     hs TnI 4hr 53 ng/L      Delta 4hr hsTnI -8 ng/L     Strep Pneumoniae, Urine [[id number]]     Lab Status: No result Specimen: Urine     Legionella antigen, urine [[id number]]     Lab Status: No result Specimen: Urine     COVID/FLU/RSV [[id number]]  (Normal) Collected: 01/27/24 0909    Lab Status: Final result Specimen: Nares from Nose Updated: 01/27/24 1023     SARS-CoV-2 Negative     INFLUENZA A PCR Negative     INFLUENZA B PCR Negative     RSV PCR Negative    Narrative:      FOR PEDIATRIC PATIENTS - copy/paste COVID Guidelines URL to browser: [email or web address]    SARS-CoV-2 assay is a Nucleic Acid Amplification assay intended for the  qualitative detection of nucleic acid from SARS-CoV-2 in nasopharyngeal  swabs. Results are for the presumptive identification of SARS-CoV-2 RNA.    Positive results are indicative of infection with SARS-CoV-2, the virus  causing COVID-19, but do not rule out bacterial infection or co-infection  with other viruses. Laboratories within the [place] and its  territories are required to report all positive results to the appropriate  public health authorities. Negative results do not preclude SARS-CoV-2  infection and should not be used as the sole basis for treatment or other  patient management decisions. Negative results must be combined with  clinical observations, patient history, and epidemiological information.  This test has not been FDA cleared or approved.    This test has been authorized by FDA under an Emergency Use Authorization  (EUA). This test is only authorized for the duration of time the  declaration that circumstances exist justifying the authorization of the  emergency use of an in vitro diagnostic tests for detection of SARS-CoV-2  virus and/or diagnosis of COVID-19 infection under section 564(b)(1) of  the Act, 21 U.S.C. 360bbb-3(b)(1),  unless the authorization is terminated  or revoked sooner. The test has been validated but independent review by FDA  and CLIA is pending.    Test performed using [employer] GeneXpert: This RT-PCR assay targets N2,  a region unique to SARS-CoV-2. A conserved region in the E-gene was chosen  for pan-Sarbecovirus detection which includes SARS-CoV-2.    According to CMS-2020-01-R, this platform meets the definition of high-throughput technology.    HS Troponin I 2hr [[id number]]  (Normal) Collected: 01/27/24 0909    Lab Status: Final result Specimen: Blood from Arm, Right Updated: 01/27/24 0946     hs TnI 2hr 49 ng/L      Delta 2hr hsTnI -12 ng/L     Procalcitonin [[id number]]  (Normal) Collected: 01/27/24 0647    Lab Status: Final result Specimen: Blood from Arm, Right Updated: 01/27/24 0910     Procalcitonin 0.16 ng/ml     Sputum culture and Gram stain [[id number]]     Lab Status: No result Specimen: Sputum     Platelet count [[id number]]     Lab Status: No result Specimen: Blood     Blood gas, venous [[id number]]  (Abnormal) Collected: 01/27/24 0825    Lab Status: Final result Specimen: Blood from Arm, Left Updated: 01/27/24 0839     pH, Oscar 7.310     pCO2, Oscar 88.6 mm Hg      pO2, Oscar 47.2 mm Hg      HCO3, Oscar 43.6 mmol/L      Base Excess, Oscar 14.2 mmol/L      O2 Content, Oscar 11.6 ml/dL      O2 HGB, VENOUS 76.4 %     HS Troponin 0hr (reflex protocol) [[id number]]  (Abnormal) Collected: 01/27/24 0647    Lab Status: Final result Specimen: Blood from Arm, Right Updated: 01/27/24 0723     hs TnI 0hr 61 ng/L     B-Type Natriuretic Peptide(BNP) [[id number]]  (Abnormal) Collected: 01/27/24 0647    Lab Status: Final result Specimen: Blood from Arm, Right Updated: 01/27/24 0722      pg/mL     Basic metabolic panel [[id number]]  (Abnormal) Collected: 01/27/24 0647    Lab Status: Final result Specimen: Blood from Arm, Right Updated: 01/27/24 0716     Sodium 133 mmol/L      Potassium 4.2 mmol/L      Chloride 85 mmol/L      CO2 41  mmol/L      ANION GAP 7 mmol/L      BUN 23 mg/dL      Creatinine 1.06 mg/dL      Glucose 120 mg/dL      Calcium 9.4 mg/dL      eGFR 72 ml/min/1.73sq m     Narrative:      National Kid[employer] Disease Foundation guidelines for Chronic Kidney Disease (CKD):     Stage 1 with normal or high GFR (GFR > 90 mL/min/1.73 square meters)    Stage 2 Mild CKD (GFR = 60-89 mL/min/1.73 square meters)    Stage 3A Moderate CKD (GFR = 45-59 mL/min/1.73 square meters)    Stage 3B Moderate CKD (GFR = 30-44 mL/min/1.73 square meters)    Stage 4 Severe CKD (GFR = 15-29 mL/min/1.73 square meters)    Stage 5 End Stage CKD (GFR <15 mL/min/1.73 square meters)  Note: GFR calculation is accurate only with a steady state creatinine    CBC and differential [[id number]]  (Abnormal) Collected: 01/27/24 0647    Lab Status: Final result Specimen: Blood from Arm, Right Updated: 01/27/24 0656     WBC 16.96 Thousand/uL      RBC 3.63 Million/uL      Hemoglobin 10.7 g/dL      Hematocrit 35.2 %      MCV 97 fL      MCH 29.5 pg      MCHC 30.4 g/dL      RDW 13.1 %      MPV 9.4 fL      Platelets 270 Thousands/uL      nRBC 0 /100 WBCs      Neutrophils Relative 86 %      Immat GRANS % 1 %      Lymphocytes Relative 4 %      Monocytes Relative 9 %      Eosinophils Relative 0 %      Basophils Relative 0 %      Neutrophils Absolute 14.45 Thousands/µL      Immature Grans Absolute 0.13 Thousand/uL      Lymphocytes Absolute 0.66 Thousands/µL      Monocytes Absolute 1.59 Thousand/µL      Eosinophils Absolute 0.07 Thousand/µL      Basophils Absolute 0.06 Thousands/µL     Blood gas, venous [[id number]]  (Abnormal) Collected: 01/27/24 0647    Lab Status: Final result Specimen: Blood from Arm, Right Updated: 01/27/24 0656     pH, Oscar 7.284     pCO2, Oscar 86.1 mm Hg      pO2, Oscar 27.8 mm Hg      HCO3, Oscar 39.9 mmol/L      Base Excess, Oscar 10.2 mmol/L      O2 Content, Oscar 6.6 ml/dL      O2 HGB, VENOUS 39.2 %                    XR chest 1 view portable   ED Interpretation by John STARKEY  DO Jessica (01/27 0731)   Possible PNA RML as interpreted by me        Final Result by Alexandra Swift MD (01/27 1044)      Mild pulmonary edema. Focal opacity in the right midlung which could represent pneumonia.               Workstation performed: [id number]               Procedures  Procedures      ED Course  ED Course as of 01/27/24 1453   Sat Jan 27, 2024   0653 Patient required and received immediate medical attention due to respiratory distress.  CPAP was transitioned to BiPAP as patient arrived.  Respiratory at the bedside.  Patient mentating appropriately, states symptoms have been present for the past few days with worsening shortness of breath and wheezing consistent with COPD exacerbations.  Patient was given IV magnesium, IV Solu-Medrol, and 2 albuterol treatments prior to arrival.  Initial saturation of 70% per EMS, improved to above 95 on CPAP and with treatments.  Orders placed for BiPAP, beta-agonist therapy, chest x-ray, CBC, BMP, troponin, and a VBG to assess for hypercapnia.  Brief discussion with the patient at bedside about goals of care.  He requests full code measures if necessary.    Presentation is most concerning for acute COPD exacerbation, less likely CHF as patient does not appear to be hypervolemic.  Also considered ACS, pneumonia.   0657 pCO2, Oscar(!): 86.1  Currently on BiPAP; expected to improve. Patient feeling better with BiPAP. CO2 was as high as 106 on prior vbgs. Will monitor closely.     0658 WBC(!): 16.96  Likely due to stress reaction from respiratory distress; not sepsis as patient is afebrile.   0659 EKG shows sinus tachycardia at 116 bpm, left bundle branch block pattern as seen on prior EKG, no STEMI as interpreted by me.   0722 BNP(!): 380  Less suspicion for CHF exacerbation     0728 hs TnI 0hr(!): 61  Likely from increased work of breathing.  Will follow with serial troponin.       Patient tolerated the BiPAP well.  Case was discussed with critical care; due to  persistent need for BiPAP, arranges made for admission under stepdown 1 service.  Repeat VBG shows slight increase in pCO2, but patient is mentating appropriately.  On reassessment, patient borderline hypotensive with a pressure of 90 systolic, likely due to to positive pressure.  Will give patient a fluid bolus prior to admission to critical care.  Chest x-ray reviewed, showing possible infiltrate in the right lung.  Patient was given ceftriaxone and azithromycin for antibiotic coverage.          Medical Decision Making  Amount and/or Complexity of Data Reviewed  Labs: ordered. Decision-making details documented in ED Course.  Radiology: ordered and independent interpretation performed.    Risk  Prescription drug management.  Decision regarding hospitalization.          Disposition  Final diagnoses:   COPD with acute exacerbation (HCC)   RML pneumonia     Time reflects when diagnosis was documented in both MDM as applicable and the Disposition within this note       Time User Action Codes Description Comment    1/27/2024  6:47 AM John Lopez [J44.1] COPD exacerbation (HCC)     1/27/2024  6:47 AM Jonh Lopez Remove [J44.1] COPD exacerbation (HCC)     1/27/2024  6:47 AM John Lopez Add [J44.1] COPD with acute exacerbation (HCC)     1/27/2024  7:31 AM John Lopez Add [J18.9] RML pneumonia           ED Disposition       ED Disposition   Admit    Condition   Stable    Date/Time   Sat Jan 27, 2024  7:31 AM    Comment                  Follow-up Information    None         Current Discharge Medication List        CONTINUE these medications which have NOT CHANGED    Details   albuterol (PROVENTIL HFA,VENTOLIN HFA) 90 mcg/act inhaler Inhale 2 puffs every 4 (four) hours as needed for wheezing or shortness of breath  Qty: 18 g, Refills: 5    Associated Diagnoses: COPD exacerbation (HCC)      budesonide (Pulmicort) 1 MG/2ML nebulizer solution Take 2 mL (1 mg total) by nebulization daily Rinse mouth after use.  Qty:  120 mL, Refills: 5    Associated Diagnoses: Emphysema, unspecified (HCC)      cyanocobalamin (VITAMIN B-12) 1000 MCG tablet Take 1 tablet (1,000 mcg total) by mouth daily  Qty: 30 tablet, Refills: 0    Associated Diagnoses: Anemia      Diclofenac Sodium (VOLTAREN) 1 % APPLY 2 GRAMS 4 TIMES A DAY      ergocalciferol (VITAMIN D2) 50,000 units       formoterol (PERFOROMIST) 20 MCG/2ML nebulizer solution TAKE 2 ML (20 MCG TOTAL) BY NEBULIZATION 2 (TWO) TIMES A DAY  Qty: 120 mL, Refills: 5    Associated Diagnoses: Emphysema, unspecified (HCC)      Insulin Pen Needle (BD Pen Needle Cathie 2nd Gen) 32G X 4 MM MISC For use with insulin pen. Pharmacy may dispense brand covered by insurance.  Qty: 100 each, Refills: 0    Associated Diagnoses: Steroid-induced hyperglycemia      insuln lispro (HumaLOG KwikPen) 200 units/mL CONCENTRATED U-200 injection pen Inject 3 Units under the skin 3 (three) times a day with meals  Qty: 6 mL, Refills: 0    Associated Diagnoses: Steroid-induced hyperglycemia      ipratropium-albuterol (DUO-NEB) 0.5-2.5 mg/3 mL nebulizer solution Take 3 mL by nebulization 4 (four) times a day  Qty: 360 mL, Refills: 11    Associated Diagnoses: Emphysema, unspecified (HCC)      Melatonin 5 MG TABS Take 1 tablet by mouth daily at bedtime      NovoLOG FlexPen 100 units/mL injection pen INJECT 3 UNITS UDNER THE SKIN 3 TIMES A DAY WITH MEALS      OneTouch Delica Lancets 33G MISC May substitute brand based on insurance coverage. Check glucose TID.  Qty: 100 each, Refills: 0    Associated Diagnoses: Steroid-induced hyperglycemia      predniSONE 5 mg tablet Take 1 tablet (5 mg total) by mouth daily Resume prednisone 5mg daily when taper is completed  Qty: 30 tablet, Refills: 0    Associated Diagnoses: Acute respiratory failure, unspecified whether with hypoxia or hypercapnia (HCC)      rosuvastatin (CRESTOR) 10 MG tablet Take 1 tablet (10 mg total) by mouth daily  Qty: 90 tablet, Refills: 3    Associated Diagnoses:  Pulmonary emphysema, unspecified emphysema type ([facility])      sodium chloride (OCEAN) 0.65 % nasal spray 1 spray into each nostril every hour as needed for congestion  Qty: 30 mL, Refills: 0    Associated Diagnoses: Acute respiratory failure, unspecified whether with hypoxia or hypercapnia ([facility])      sodium chloride 1 g tablet Take 1 tablet (1 g total) by mouth 2 (two) times a day with meals  Qty: 60 tablet, Refills: 0    Associated Diagnoses: Chronic hyponatremia      tamsulosin (FLOMAX) 0.4 mg Take 1 capsule (0.4 mg total) by mouth daily with dinner  Refills: 0    Associated Diagnoses: Urinary retention; BPH with obstruction/lower urinary tract symptoms      torsemide (DEMADEX) 10 mg tablet TAKE 4 TABLETS BY MOUTH DAILY.  Qty: 360 tablet, Refills: 2    Associated Diagnoses: Chronic HFrEF (heart failure with reduced ejection fraction) (Formerly [facility])           No discharge procedures on file.    PDMP Review         Value Time User    PDMP Reviewed  Yes 9/1/2023  8:21 AM Yue Menon PA-C             ED Provider  Attending physically available and evaluated Natalio Hartmann Jr.. I managed the patient along with the ED Attending.    Electronically Signed by           John Lopez DO  01/27/24 6096

## 2024-01-27 NOTE — PROGRESS NOTES
01/27/24 1400   Clinical Encounter Type   Visited With Patient and family together   Routine Visit Introduction   Referral From Nurse   Referral To       Intern visited with pt based on ED nurse referral. Pt appeared to be sleeping.  Intern introduced themself to the pt's family and asked if they needed anything. The pt's family did not need anything at this time. Spiritual care remains available.

## 2024-01-27 NOTE — ASSESSMENT & PLAN NOTE
Wt Readings from Last 3 Encounters:   01/22/24 46.7 kg (103 lb)   01/10/24 43.5 kg (96 lb)   10/14/23 46.2 kg (101 lb 13.6 oz)     Previous cardiac echo on 5/1/2023 with EF 20%  Reviewing outpatient cardiology note this is secondary to dilated cardiomyopathy and left bundle branch block  Maintained on diuretics

## 2024-01-27 NOTE — CONSULTS
[facility]  Consult  Name: Natalio Hartmann Jr. 68 y.o. male I MRN: [id number]  Unit/Bed#: ICCU 202-01 I Date of Admission: 1/27/2024   Date of Service: 1/27/2024 I Hospital Day: 0    Consults    Assessment/Plan   * Acute on chronic respiratory failure with hypoxia and hypercapnia (HCC)  Assessment & Plan  Patient is chronically on home oxygen between 4 to 6 L/min  Presented with severe hypoxia and shortness of breath  Placed on BiPAP in ED  Chest x-ray with mild pulmonary edema. Focal opacity in the right midlung which could represent pneumonia.     Patient is afebrile with leukocytosis  Currently requiring continuous BiPAP  Patient is full code  Likely secondary to end-stage COPD exacerbation and possible pneumonia/pulmonary edema    End-stage COPD with acute exacerbation (HCC)  Assessment & Plan  Patient with end-stage COPD on chronic hypoxic respiratory failure on home oxygen, chronically on prednisone prednisone 5 mg daily, budesonide/Perforomist twice daily, DuoNebs 4 times daily.   Follow-up with pulmonology as an outpatient  Presented with COPD exacerbation, CO2 retention and respiratory acidosis    Chronic HFrEF (heart failure with reduced ejection fraction)   Assessment & Plan  Wt Readings from Last 3 Encounters:   01/22/24 46.7 kg (103 lb)   01/10/24 43.5 kg (96 lb)   10/14/23 46.2 kg (101 lb 13.6 oz)     Previous cardiac echo on 5/1/2023 with EF 20%  Reviewing outpatient cardiology note this is secondary to dilated cardiomyopathy and left bundle branch block  Maintained on diuretics          Chronic hypercapnia/KEVIN  Assessment & Plan  Patient on home BiPAP at bedtime    Chronic hyponatremia  Assessment & Plan  Per patient's wife, currently he is off sodium tablet    Prostate cancer (HCC)  Assessment & Plan  Status post TURP           VTE Prophylaxis:   High Risk (Score >/= 5) - Pharmacological DVT Prophylaxis Ordered: enoxaparin (Lovenox). Sequential Compression  Devices Ordered.    Mobility:      HLM Goal NOT achieved. Continue with multidisciplinary rounding and encourage appropriate mobility to improve upon HLM goals.    Recommendations for Discharge:  no    Total Time Spent on Date of Encounter in care of patient: 60 mins. This time was spent on one or more of the following: performing physical exam; counseling and coordination of care; obtaining or reviewing history; documenting in the medical record; reviewing/ordering tests, medications or procedures; communicating with other healthcare professionals and discussing with patient's family/caregivers.    Collaboration of Care: Were Recommendations Directly Discussed with Primary Treatment Team? Yes    History of Present Illness:  Natalio Hartmann Jr. is a 68 y.o. male with past medical history of end-stage COPD, chronic hypoxic respiratory failure on home oxygen 4 to 6 L minute, chronic HFrEF with EF 20%, prostate cancer status post TURP in 2021, chronic hyponatremia, GERD, hyperlipidemia, severe protein calorie malnutrition, chronic hypercapnia/KEVIN, pulmonary cachexia due to COPD who presented to ED due to worsening shortness of breath and generalized weakness started yesterday    Patient currently on BiPAP machine , lethargic unable to provide history, I spoke with patient's wife at bedside  Patient was found by EMS with oxygen saturation of 70%, he was placed on CPAP machine and was given 2 albuterol treatments, 125 mg Solu-Medrol IV and 2 g of magnesium prior to arrival by EMS    She was evaluated in the emergency room he is afebrile with significant leukocytosis with tachycardia and tachypnea, pH of 7.28, , negative procalcitonin, chest x-ray with pulmonary edema and possible right-sided pneumonia    Patient was placed on BiPAP machine in ED due to increased work of breathing  He also received IV Zithromax/ceftriaxone and normal sinus IV bolus    I was called by ED to admit him to the floor, however patient  continued to be acidotic while on BiPAP with increased work of breathing requiring continuous BiPAP  Patient is full code  Given patient critical illness and severe hypoxia on presentation above finding it is better to be served at ICU level      Patient will be admitted to ICU service for further management and treatment    Discussed with ED physician  Discussed with patient's wife at bedside    Review of Systems:  Review of Systems   Unable to perform ROS: Acuity of condition   Constitutional:  Positive for appetite change. Negative for chills and fever.   Respiratory:  Positive for cough, shortness of breath and wheezing.    Gastrointestinal:  Negative for vomiting.       Past Medical and Surgical History:   Past Medical History:   Diagnosis Date    Acute metabolic encephalopathy 03/29/2022    Arthritis     Bladder mass     Cardiomyopathy (HCC)     Chest pain     COPD (chronic obstructive pulmonary disease) (HCC)     COVID-19 virus infection 02/23/2023    CPAP (continuous positive airway pressure) dependence     Emphysema of lung (HCC)     Hypoxia     nocturnal    Left bundle branch block     Multiple pulmonary nodules     last assessed: 10/12/16    Pneumonia     Sleep apnea, obstructive     Smoker     Weight loss 08/29/2019       Past Surgical History:   Procedure Laterality Date    COLONOSCOPY      CYSTOSCOPY      CYSTOSCOPY  02/17/2021    CA BLADDER INSTILLATION ANTICARCINOGENIC AGENT N/A 1/3/2020    Procedure: INSTILLATION MITOMYCIN;  Surgeon: Sundeep Canchola MD;  [facility];  Service: Urology    CA CYSTO W/REMOVAL OF LESIONS SMALL N/A 1/3/2020    Procedure: TRANSURETHRAL RESECTION OF BLADDER TUMOR (TURBT);  Surgeon: Sundeep Canchola MD;  [facility];  Service: Urology    CA CYSTOURETHROSCOPY WITH BIOPSY N/A 4/13/2021    Procedure: CYSTOSCOPY WITH BIOPSIES;  Surgeon: Sundeep Canchola MD;  [facility];  Service: Urology    CA TRURL ELECTROSURG RESCJ PROSTATE BLEED COMPLETE N/A  2021    Procedure: TRANSURETHRAL RESECTION OF PROSTATE (TURP) BLADDER BIOPSY, FULGURATION;  Surgeon: Sundeep Canchola MD;  [facility];  Service: Urology       Meds/Allergies:  all medications and allergies reviewed    Allergies: No Known Allergies    Social History:  Marital Status: /Civil Union  Substance Use History:   Social History     Substance and Sexual Activity   Alcohol Use Not Currently    Comment: rarely     Social History     Tobacco Use   Smoking Status Former    Current packs/day: 0.00    Average packs/day: 2.5 packs/day for 42.0 years (105.0 ttl pk-yrs)    Types: Cigarettes    Start date:     Quit date:     Years since quittin.0   Smokeless Tobacco Former   Tobacco Comments    1 ppd for 37 years, 2010 down to 5 cigs a day, is around second hand smoke     Social History     Substance and Sexual Activity   Drug Use No       Family History:    Family History   Problem Relation Age of Onset    Diabetes Mother       Physical Exam:   Vitals:   Blood Pressure: (!) 87/56 (24 1200)  Pulse: 92 (24 1200)  Temperature: 99.3 °F (37.4 °C) (24 1130)  Temp Source: Rectal (24 1130)  Respirations: 20 (24 1200)  SpO2: 99 % (24 1200)    Physical Exam  Vitals reviewed.   Constitutional:       General: He is in acute distress.      Appearance: He is ill-appearing and toxic-appearing.   HENT:      Head: Normocephalic and atraumatic.      Nose:      Comments: On BiPAP machine  Eyes:      Extraocular Movements: Extraocular movements intact.   Cardiovascular:      Rate and Rhythm: Regular rhythm. Tachycardia present.      Pulses: Normal pulses.      Heart sounds: Normal heart sounds. No murmur heard.  Pulmonary:      Effort: Pulmonary effort is normal.      Breath sounds: Wheezing, rhonchi and rales present.   Abdominal:      General: Bowel sounds are normal. There is no distension.      Palpations: Abdomen is soft.      Tenderness: There is no abdominal  tenderness.   Musculoskeletal:      Cervical back: Normal range of motion and neck supple.      Comments: Trace bilateral venous stasis pedal edema   Skin:     General: Skin is warm and dry.   Neurological:      Comments: Sleeping well on BiPAP machine            Additional Data:   Lab Results:    Results from last 7 days   Lab Units 01/27/24  0647   WBC Thousand/uL 16.96*   HEMOGLOBIN g/dL 10.7*   HEMATOCRIT % 35.2*   PLATELETS Thousands/uL 270   NEUTROS PCT % 86*   LYMPHS PCT % 4*   MONOS PCT % 9   EOS PCT % 0     Results from last 7 days   Lab Units 01/27/24  0647   SODIUM mmol/L 133*   POTASSIUM mmol/L 4.2   CHLORIDE mmol/L 85*   CO2 mmol/L 41*   BUN mg/dL 23   CREATININE mg/dL 1.06   ANION GAP mmol/L 7   CALCIUM mg/dL 9.4   GLUCOSE RANDOM mg/dL 120             Lab Results   Component Value Date/Time    HGBA1C 6.5 (H) 08/30/2023 10:18 AM    HGBA1C 5.7 (H) 07/20/2023 05:16 AM    HGBA1C 5.4 02/14/2023 05:17 AM    HGBA1C 4.9 08/17/2015 07:56 AM         Results from last 7 days   Lab Units 01/27/24  0647   PROCALCITONIN ng/ml 0.16       Imaging: Chest x-ray with mild pulmonary edema and possible right midlung pneumonia  XR chest 1 view portable   ED Interpretation by John Lopez DO (01/27 0731)   Possible PNA RML as interpreted by me        Final Result by Alexandra Swift MD (01/27 1044)      Mild pulmonary edema. Focal opacity in the right midlung which could represent pneumonia.               Workstation performed: [id number]             EKG, Pathology, and Other Studies Reviewed on Admission:   EKG: Personally reviewed shows sinus tachycardia at 116 bpm with a chronic left bundle branch block    ** Please Note: This note may have been constructed using a voice recognition system. **

## 2024-01-27 NOTE — H&P
[facility]  H&P: Critical Care  Name: Natalio Hartmann Jr. 68 y.o. male I MRN: [id number]  Unit/Bed#: ED 27 I Date of Admission: 1/27/2024   Date of Service: 1/27/2024 I Hospital Day: 0      Assessment/Plan   Neuro:   No active issues    CV:   Diagnosis: HFpEF  5'23 TTE: Left ventricular ejection fraction is 20%. Systolic function is severely reduced. There is severe global hypokinesis with regional variation.   Follows with cardiology   Nonobstructive CAD by cardiac cath   Chronic systolic HF 2/2 LBBB and dilated CM  Unable to tolerate GDMT 2/2 ARAM and hypotension     Plan:   Weight appears to be elevated from office visits (1/10 9g lb, 1/22 103 lbs)  Fluid restriction, salt restriction   Daily weights   Accurate I/O   Continue statin   Would benefit from IV diuresis     Pulm:  Diagnosis: Acute on chronic hypoxemic and hypercapneic resp failure 2/2 excerebration of end stage COPD  Home O2 requirements range from 3-6 L depending on if at rest vs exertion   On Trilogy vent at night 4L O2  Requiring continous BiPAP on admission   S/P CTX, azithro, duonebs and solmedrol 125 in ED  Plan:   Budesnoide nebs BID  Periformist BID   Atrovent-xopenex TID  Solumedrol 40 Q8H  Resp protocol   Currently on BiPAP 14/6 100% - wean as able   Follow up VBG   Patient has required intubation in the past and is agreeable     GI:   No active issues    :   Diagnosis: ARAM   No hx of CKD, baseline Cr 0.6-0.75  POA, elevated Cr 1.06  Plan:   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   Patient would benefit from IV diuresis today   Diagnosis: Chronic hyponatremia   Follows with nephrology   Hyponatremia felt to multifactorial in the setting of volume overload and component of SIADH  Not on salt tablets  Plan:   Continue fluid restriction 1.5L  Continue demadex 40 qd   Monitor BMP daily  Diagnosis: Hx of prostate cancer s/p TURP(2021), BPH  Plan:   Continue home flomax     F/E/N:    Fluids: S/P 1 L NS bolus in ed    Electrolytes: monitor/replete PRN  Nutrition: po diet 1.5 fluid restriction, 2gm salt restrition when able to come off Bipap    Heme/Onc:   DVT ppx: lovenox   Diagnosis: Chronic anemia  Baseline Hgb around 10   Hgb 10.7 on admission   Plan:  HH stable and at baseline   Continue B12    Endo:   Diagnosis: DM, A1c 6.5  Patient prescribed novolog 3 U TID AC however reports he does not take this   Plan:  SSI will IP   Goal -180    ID:   Diagnosis: Leukocytosis  POA WBC 16  CXR wet read concerning for RML PNA   Patient afebrile  Procal negative  S/p CTX and azithro in ED for CPAP suspicion   Plan:  Continue azithro per COPD treatment   Continue CTX for CAP  Trend WBC and fever curve   Check procal in morning   COVID/Flu/RSV r/o   Check sputum culture     MSK/Skin:   Diagnosis: Cachexia   Plan:   PT/OT when able   OOB when able    Disposition: Stepdown Level 1       History of Present Illness     HPI: Natalio Hartmann Jr. is a 68 y.o.  M end stage COPD 3-6L w rest/ exertion, on Trilogy vent 4L O2 at night HFrEF 30%, G1DD, DM, BPH, chronic hyponatremia 2/2 SIADH, prostate cancer s/p TURP(2021), BIBA 2/2 SOB on CPAP 15L 100%. pH 7.284 w hypercapneia hypoxia and hypercarbia. Patient placed on BiPAP in ED 14/6 100%. Afebrile, leukocytosis of 16.96, . Initital trop elevation of 61. CXR concerning for RML PNA. Patient was given dubo nebs, solumedrol, CTX, azithro and 1L NS bolus. Patient admitted to [facility] for COPD exacerbation requiring continuous BiPAP    History obtained from chart review and the patient.  Review of Systems   Constitutional:  Negative for chills and fever.   HENT:  Negative for ear pain and sore throat.    Eyes:  Negative for pain and visual disturbance.   Respiratory:  Positive for shortness of breath. Negative for cough.    Cardiovascular:  Negative for chest pain and palpitations.   Gastrointestinal:  Negative for abdominal pain and vomiting.   Genitourinary:  Negative for dysuria and hematuria.    Musculoskeletal:  Negative for arthralgias and back pain.   Skin:  Negative for color change and rash.   Neurological:  Negative for seizures and syncope.   All other systems reviewed and are negative.     Historical Information   Past Medical History:  03/29/2022: Acute metabolic encephalopathy  No date: Arthritis  No date: Bladder mass  No date: Cardiomyopathy (HCC)  No date: Chest pain  No date: COPD (chronic obstructive pulmonary disease) (Formerly [facility])  02/23/2023: COVID-19 virus infection  No date: CPAP (continuous positive airway pressure) dependence  No date: Emphysema of lung (Formerly [facility])  No date: Hypoxia      Comment:  nocturnal  No date: Left bundle branch block  No date: Multiple pulmonary nodules      Comment:  last assessed: 10/12/16  No date: Pneumonia  No date: Sleep apnea, obstructive  No date: Smoker  08/29/2019: Weight loss Past Surgical History:  No date: COLONOSCOPY  No date: CYSTOSCOPY  02/17/2021: CYSTOSCOPY  1/3/2020: MS BLADDER INSTILLATION ANTICARCINOGENIC AGENT; N/A      Comment:  Procedure: INSTILLATION MITOMYCIN;  Surgeon: Sundeep Canchola MD;  [facility];  Service: Urology  1/3/2020: MS CYSTO W/REMOVAL OF LESIONS SMALL; N/A      Comment:  Procedure: TRANSURETHRAL RESECTION OF BLADDER TUMOR                (TURBT);  Surgeon: Sundeep Canchola MD;  [facility];  Service: Urology  4/13/2021: MS CYSTOURETHROSCOPY WITH BIOPSY; N/A      Comment:  Procedure: CYSTOSCOPY WITH BIOPSIES;  Surgeon: Sundeep Canchola MD;  [facility];  Service: Urology  4/13/2021: MS TRURL ELECTROSURG RESCJ PROSTATE BLEED COMPLETE; N/A      Comment:  Procedure: TRANSURETHRAL RESECTION OF PROSTATE (TURP)                BLADDER BIOPSY, FULGURATION;  Surgeon: Sundeep Canchola MD;  [facility];  Service: Urology   Current Outpatient Medications   Medication Instructions    albuterol (PROVENTIL HFA,VENTOLIN HFA) 90 mcg/act inhaler 2  puffs, Inhalation, Every 4 hours PRN    budesonide (PULMICORT) 1 mg, Nebulization, Daily, Rinse mouth after use.    cyanocobalamin (VITAMIN B-12) 1,000 mcg, Oral, Daily    Diclofenac Sodium (VOLTAREN) 1 % APPLY 2 GRAMS 4 TIMES A DAY    ergocalciferol (VITAMIN D2) 50,000 units No dose, route, or frequency recorded.    formoterol (PERFOROMIST) 20 MCG/2ML nebulizer solution TAKE 2 ML (20 MCG TOTAL) BY NEBULIZATION 2 (TWO) TIMES A DAY    HumaLOG KwikPen 3 Units, Subcutaneous, 3 times daily with meals    Insulin Pen Needle (BD Pen Needle Cathie 2nd Gen) 32G X 4 MM MISC For use with insulin pen. Pharmacy may dispense brand covered by insurance.    ipratropium-albuterol (DUO-NEB) 0.5-2.5 mg/3 mL nebulizer solution 3 mL, Nebulization, 4 times daily    Melatonin 5 MG TABS 1 tablet, Oral, Daily at bedtime    NovoLOG FlexPen 100 units/mL injection pen INJECT 3 UNITS UDNER THE SKIN 3 TIMES A DAY WITH MEALS    OneTouch Delica Lancets 33G MISC May substitute brand based on insurance coverage. Check glucose TID.    predniSONE 5 mg, Oral, Daily, Resume prednisone 5mg daily when taper is completed    rosuvastatin (CRESTOR) 10 mg, Oral, Daily    sodium chloride (OCEAN) 0.65 % nasal spray 1 spray, Nasal, Every 1 hour PRN    sodium chloride 1 g, Oral, 2 times daily with meals    tamsulosin (FLOMAX) 0.4 mg, Oral, Daily with dinner    torsemide (DEMADEX) 40 mg, Oral, Daily    No Known Allergies   Social History     Tobacco Use    Smoking status: Former     Current packs/day: 0.00     Average packs/day: 2.5 packs/day for 42.0 years (105.0 ttl pk-yrs)     Types: Cigarettes     Start date: 1970     Quit date: 2012     Years since quittin.0    Smokeless tobacco: Former    Tobacco comments:     1 ppd for 37 years, 2010 down to 5 cigs a day, is around second hand smoke   Vaping Use    Vaping status: Never Used   Substance Use Topics    Alcohol use: Not Currently     Comment: rarely    Drug use: No    Family History   Problem Relation Age of  Onset    Diabetes Mother           Objective                            Vitals I/O      Most Recent Min/Max in 24hrs   Temp 98.7 °F (37.1 °C) Temp  Min: 98.7 °F (37.1 °C)  Max: 98.7 °F (37.1 °C)   Pulse 100 Pulse  Min: 100  Max: 118   Resp (!) 30 Resp  Min: 26  Max: 30   /62 BP  Min: 106/62  Max: 118/63   O2 Sat 99 % SpO2  Min: 99 %  Max: 100 %    No intake or output data in the 24 hours ending 01/27/24 0915    Diet NPO    Invasive Monitoring           Physical Exam   Physical Exam  Eyes:      Extraocular Movements: Extraocular movements intact.      Pupils: Pupils are equal, round, and reactive to light.   Skin:     General: Skin is warm and dry.   HENT:      Head: Normocephalic and atraumatic.      Nose: No congestion.   Cardiovascular:      Rate and Rhythm: Normal rate and regular rhythm.   Musculoskeletal:         General: No swelling or deformity.      Right lower leg: No edema.      Left lower leg: No edema.   Abdominal: General: Bowel sounds are normal.      Palpations: Abdomen is soft.      Tenderness: There is no abdominal tenderness.   Constitutional:       Appearance: He is underweight. He is ill-appearing.   Pulmonary:      Effort: Tachypnea present.      Breath sounds: Wheezing present. No rales.   Neurological:      General: No focal deficit present.      Mental Status: He is oriented to person, place and time. He is calm.            Diagnostic Studies      EKG: LBBB  Imaging: I have personally reviewed pertinent reports.   and I have personally reviewed pertinent films in PACS     Medications:  Scheduled PRN   azithromycin, 500 mg, Once  budesonide, 1 mg, Daily  chlorhexidine, 15 mL, Q12H GABE  cyanocobalamin, 1,000 mcg, Daily  enoxaparin, 40 mg, Daily  ergocalciferol, 50,000 Units, Weekly  formoterol, 20 mcg, Q12H  ipratropium, 0.5 mg, TID  levalbuterol, 1.25 mg, TID  melatonin, 6 mg, HS  [START ON 1/28/2024] methylPREDNISolone sodium succinate, 40 mg, Q8H  pravastatin, 80 mg, Daily With  Dinner  sodium chloride, 1,000 mL, Once  torsemide, 40 mg, Daily      sodium chloride, 1 spray, Q1H PRN       Continuous          Labs:    CBC    Recent Labs     01/27/24  0647   WBC 16.96*   HGB 10.7*   HCT 35.2*        BMP    Recent Labs     01/27/24  0647   SODIUM 133*   K 4.2   CL 85*   CO2 41*   AGAP 7   BUN 23   CREATININE 1.06   CALCIUM 9.4       Coags    No recent results     Additional Electrolytes  No recent results       Blood Gas    No recent results  Recent Labs     01/27/24  0825   PHVEN 7.310   ZWA3AYO 88.6*   PO2VEN 47.2*   ODR3NUC 43.6*   BEVEN 14.2   [id number] 76.4    LFTs  No recent results    Infectious  Recent Labs     01/27/24  0647   PROCALCITONI 0.16     Glucose  Recent Labs     01/27/24  0647   GLUC 120           Olga Goodman,

## 2024-01-27 NOTE — ED PROCEDURE NOTE
Procedure  POC Cardiac US    Date/Time: 1/27/2024 7:29 AM    Performed by: Yann Reynoso MD  Authorized by: Yann Reynoso MD    Patient location:  ED  Procedure details:     Exam Type:  Educational    Indications: suspected volume depletion, dyspnea and respiratory distress      Assessment / Evaluation for: cardiac function, pericardial effusion, intravascular volume status and right heart strain (suspected pulmonary embolism)      Exam Type: initial exam      Image quality: diagnostic      Image availability:  Images available in PACS  Patient Details:     Cardiac Rhythm:  Regular    Mechanical ventilation: No (BiPap)    Cardiac findings:     Views obtained: parasternal long axis, parasternal short axis, subcostal and apical      Pericardial effusion: absent      Tamponade physiology: absent      Wall motion: hyperdynamic      LV systolic function: depressed      RV dilation: mild    IVC findings:     IVC Size: dilated      IVC Inspiratory Collapse: partial      IVC Variability (%):  22  Interpretation:     Fluid status: Difficult to assess (on BiPap)     Global hypokinesis (anteroseptal worse than posterior)                   Yann Reynoso MD  01/27/24 0736

## 2024-01-27 NOTE — RESPIRATORY THERAPY NOTE
RT Protocol Note  Natalio Hartmann Jr. 68 y.o. male MRN: [id number]  Unit/Bed#: ED 27 Encounter: [id number]    Assessment    Active Problems:  There are no active Hospital Problems.      Home Pulmonary Medications:  yes   01/27/24 0638   Respiratory Protocol   Protocol Initiated? Yes   Protocol Selection Respiratory   Language Barrier? No   Medical & Social History Reviewed? Yes   Diagnostic Studies Reviewed? Yes   Physical Assessment Performed? Yes   Home Devices/Therapy Home O2   Respiratory Plan Vent/NIV/HFNC   Respiratory Assessment   Assessment Type Assess only   General Appearance Alert;Awake   Respiratory Pattern Tachypneic   Bilateral Breath Sounds Diminished;Expiratory wheezes   Resp Comments Assessment of Respiratory protocol.  Pt came into the ED by ambulance via cpap in respiratory distress.  Placed on Bipap 14/6 100% and titrated FIO2 TO 70%.  100%. Hx of COPD,CHF. On O2 at home   Additional Assessments   Pulse (!) 118   Respirations (!) 26   SpO2 99 %       Home Devices/Therapy: (P) Home O2    Past Medical History:   Diagnosis Date    Acute metabolic encephalopathy 03/29/2022    Arthritis     Bladder mass     Cardiomyopathy (HCC)     Chest pain     COPD (chronic obstructive pulmonary disease) (HCC)     COVID-19 virus infection 02/23/2023    CPAP (continuous positive airway pressure) dependence     Emphysema of lung (HCC)     Hypoxia     nocturnal    Left bundle branch block     Multiple pulmonary nodules     last assessed: 10/12/16    Pneumonia     Sleep apnea, obstructive     Smoker     Weight loss 08/29/2019     Social History     Socioeconomic History    Marital status: /Civil Union     Spouse name: Not on file    Number of children: Not on file    Years of education: Not on file    Highest education level: Not on file   Occupational History    Not on file   Tobacco Use    Smoking status: Former     Current packs/day: 0.00     Average packs/day: 2.5 packs/day for 42.0 years (105.0 ttl pk-yrs)      Types: Cigarettes     Start date:      Quit date:      Years since quittin.0    Smokeless tobacco: Former    Tobacco comments:     1 ppd for 37 years, 2010 down to 5 cigs a day, is around second hand smoke   Vaping Use    Vaping status: Never Used   Substance and Sexual Activity    Alcohol use: Not Currently     Comment: rarely    Drug use: No    Sexual activity: Not Currently     Partners: Female   Other Topics Concern    Not on file   Social History Narrative    Daily coffee consumption: 8 or more cups a day        Used to work in [employer].  On disability.     Social Determinants of Health     Financial Resource Strain: Not on file   Food Insecurity: No Food Insecurity (2023)    Hunger Vital Sign     Worried About Running Out of Food in the Last Year: Never true     Ran Out of Food in the Last Year: Never true   Transportation Needs: No Transportation Needs (2023)    PRAPARE - Transportation     Lack of Transportation (Medical): No     Lack of Transportation (Non-Medical): No   Physical Activity: Not on file   Stress: Not on file   Social Connections: Not on file   Intimate Partner Violence: Not on file   Housing Stability: Low Risk  (2023)    Housing Stability Vital Sign     Unable to Pay for Housing in the Last Year: No     Number of Places Lived in the Last Year: 1     Unstable Housing in the Last Year: No       Subjective         Objective    Physical Exam:   Assessment Type: Assess only  General Appearance: Alert, Awake  Respiratory Pattern: Tachypneic  Bilateral Breath Sounds: Diminished, Expiratory wheezes    Vitals:  Blood pressure 118/63, pulse (!) 118, temperature 98.7 °F (37.1 °C), temperature source Axillary, resp. rate (!) 30, SpO2 99%.          Imaging and other studies:           Plan    Respiratory Plan: (P) Vent/NIV/HFNC        Resp Comments: (P) Assessment of Respiratory protocol.  Pt came into the ED by ambulance via cpap in respiratory distress.  Placed on  Bipap 14/6 100% and titrated FIO2 TO 70%.  100%. Hx of COPD,CHF. On O2 at home

## 2024-01-27 NOTE — ED NOTES
RN reassessed pt and found pt to be more lethargic and more difficult to awaken than before; pt's blood pressure hypotensive as documented; RN notified Dr. LEELA Goodman DO on change in pt condition; provider to bedside as well as additional provider to reassess pt; no new orders placed at this time     Magnus Martinez RN  01/27/24 7548

## 2024-01-27 NOTE — PLAN OF CARE
Problem: PAIN - ADULT  Goal: Verbalizes/displays adequate comfort level or baseline comfort level  Description: Interventions:  - Encourage patient to monitor pain and request assistance  - Assess pain using appropriate pain scale  - Administer analgesics based on type and severity of pain and evaluate response  - Implement non-pharmacological measures as appropriate and evaluate response  - Consider cultural and social influences on pain and pain management  - Notify physician/advanced practitioner if interventions unsuccessful or patient reports new pain  1/27/2024 1342 by Emma Chua RN  Outcome: Progressing  1/27/2024 1342 by Emma Chua RN  Outcome: Progressing     Problem: INFECTION - ADULT  Goal: Absence or prevention of progression during hospitalization  Description: INTERVENTIONS:  - Assess and monitor for signs and symptoms of infection  - Monitor lab/diagnostic results  - Monitor all insertion sites, i.e. indwelling lines, tubes, and drains  - Monitor endotracheal if appropriate and nasal secretions for changes in amount and color  - [place] appropriate cooling/warming therapies per order  - Administer medications as ordered  - Instruct and encourage patient and family to use good hand hygiene technique  - Identify and instruct in appropriate isolation precautions for identified infection/condition  1/27/2024 1342 by Emma Chua RN  Outcome: Progressing  1/27/2024 1342 by Emma Chua RN  Outcome: Progressing     Problem: SAFETY ADULT  Goal: Patient will remain free of falls  Description: INTERVENTIONS:  - Educate patient/family on patient safety including physical limitations  - Instruct patient to call for assistance with activity   - Consult OT/PT to assist with strengthening/mobility   - Keep Call bell within reach  - Keep bed low and locked with side rails adjusted as appropriate  - Keep care items and personal belongings within reach  - Initiate and maintain comfort rounds  -  Make Fall Risk Sign visible to staff  - Apply yellow socks and bracelet for high fall risk patients  - Consider moving patient to room near nurses station  1/27/2024 1342 by Emma Chua RN  Outcome: Progressing  1/27/2024 1342 by Emma Chua RN  Outcome: Progressing

## 2024-01-28 LAB
ALBUMIN SERPL BCP-MCNC: 3.4 G/DL (ref 3.5–5)
ALP SERPL-CCNC: 56 U/L (ref 34–104)
ALT SERPL W P-5'-P-CCNC: 7 U/L (ref 7–52)
ANION GAP SERPL CALCULATED.3IONS-SCNC: 5 MMOL/L
AST SERPL W P-5'-P-CCNC: 14 U/L (ref 13–39)
BASOPHILS # BLD MANUAL: 0 THOUSAND/UL (ref 0–0.1)
BASOPHILS NFR MAR MANUAL: 0 % (ref 0–1)
BILIRUB SERPL-MCNC: 0.49 MG/DL (ref 0.2–1)
BUN SERPL-MCNC: 32 MG/DL (ref 5–25)
CALCIUM ALBUM COR SERPL-MCNC: 9.2 MG/DL (ref 8.3–10.1)
CALCIUM SERPL-MCNC: 8.7 MG/DL (ref 8.4–10.2)
CHLORIDE SERPL-SCNC: 90 MMOL/L (ref 96–108)
CO2 SERPL-SCNC: 38 MMOL/L (ref 21–32)
CREAT SERPL-MCNC: 0.8 MG/DL (ref 0.6–1.3)
EOSINOPHIL # BLD MANUAL: 0 THOUSAND/UL (ref 0–0.4)
EOSINOPHIL NFR BLD MANUAL: 0 % (ref 0–6)
ERYTHROCYTE [DISTWIDTH] IN BLOOD BY AUTOMATED COUNT: 13.2 % (ref 11.6–15.1)
GFR SERPL CREATININE-BSD FRML MDRD: 93 ML/MIN/1.73SQ M
GLUCOSE SERPL-MCNC: 121 MG/DL (ref 65–140)
HCT VFR BLD AUTO: 27.4 % (ref 36.5–49.3)
HGB BLD-MCNC: 8.3 G/DL (ref 12–17)
L PNEUMO1 AG UR QL IA.RAPID: NEGATIVE
LYMPHOCYTES # BLD AUTO: 0.26 THOUSAND/UL (ref 0.6–4.47)
LYMPHOCYTES # BLD AUTO: 2 % (ref 14–44)
MAGNESIUM SERPL-MCNC: 2.3 MG/DL (ref 1.9–2.7)
MCH RBC QN AUTO: 28.7 PG (ref 26.8–34.3)
MCHC RBC AUTO-ENTMCNC: 30.3 G/DL (ref 31.4–37.4)
MCV RBC AUTO: 95 FL (ref 82–98)
MONOCYTES # BLD AUTO: 0.26 THOUSAND/UL (ref 0–1.22)
MONOCYTES NFR BLD: 2 % (ref 4–12)
NEUTROPHILS # BLD MANUAL: 12.37 THOUSAND/UL (ref 1.85–7.62)
NEUTS BAND NFR BLD MANUAL: 1 % (ref 0–8)
NEUTS SEG NFR BLD AUTO: 95 % (ref 43–75)
PHOSPHATE SERPL-MCNC: 3.8 MG/DL (ref 2.3–4.1)
PLATELET # BLD AUTO: 189 THOUSANDS/UL (ref 149–390)
PLATELET BLD QL SMEAR: ADEQUATE
PMV BLD AUTO: 9.3 FL (ref 8.9–12.7)
POTASSIUM SERPL-SCNC: 3.9 MMOL/L (ref 3.5–5.3)
PROT SERPL-MCNC: 5.7 G/DL (ref 6.4–8.4)
RBC # BLD AUTO: 2.89 MILLION/UL (ref 3.88–5.62)
RBC MORPH BLD: NORMAL
S PNEUM AG UR QL: NEGATIVE
SODIUM SERPL-SCNC: 133 MMOL/L (ref 135–147)
WBC # BLD AUTO: 12.89 THOUSAND/UL (ref 4.31–10.16)

## 2024-01-28 PROCEDURE — 85027 COMPLETE CBC AUTOMATED: CPT

## 2024-01-28 PROCEDURE — 85007 BL SMEAR W/DIFF WBC COUNT: CPT

## 2024-01-28 PROCEDURE — 83735 ASSAY OF MAGNESIUM: CPT | Performed by: STUDENT IN AN ORGANIZED HEALTH CARE EDUCATION/TRAINING PROGRAM

## 2024-01-28 PROCEDURE — 94640 AIRWAY INHALATION TREATMENT: CPT

## 2024-01-28 PROCEDURE — 84100 ASSAY OF PHOSPHORUS: CPT | Performed by: STUDENT IN AN ORGANIZED HEALTH CARE EDUCATION/TRAINING PROGRAM

## 2024-01-28 PROCEDURE — 80053 COMPREHEN METABOLIC PANEL: CPT | Performed by: STUDENT IN AN ORGANIZED HEALTH CARE EDUCATION/TRAINING PROGRAM

## 2024-01-28 PROCEDURE — 94660 CPAP INITIATION&MGMT: CPT

## 2024-01-28 PROCEDURE — 99233 SBSQ HOSP IP/OBS HIGH 50: CPT | Performed by: STUDENT IN AN ORGANIZED HEALTH CARE EDUCATION/TRAINING PROGRAM

## 2024-01-28 PROCEDURE — 94664 DEMO&/EVAL PT USE INHALER: CPT

## 2024-01-28 PROCEDURE — 94760 N-INVAS EAR/PLS OXIMETRY 1: CPT

## 2024-01-28 RX ADMIN — MELATONIN 6 MG: at 21:03

## 2024-01-28 RX ADMIN — BUDESONIDE 1 MG: 0.5 INHALANT RESPIRATORY (INHALATION) at 07:43

## 2024-01-28 RX ADMIN — CHLORHEXIDINE GLUCONATE 15 ML: 1.2 SOLUTION ORAL at 21:03

## 2024-01-28 RX ADMIN — IPRATROPIUM BROMIDE 0.5 MG: 0.5 SOLUTION RESPIRATORY (INHALATION) at 19:51

## 2024-01-28 RX ADMIN — LEVALBUTEROL HYDROCHLORIDE 1.25 MG: 1.25 SOLUTION RESPIRATORY (INHALATION) at 16:06

## 2024-01-28 RX ADMIN — LEVALBUTEROL HYDROCHLORIDE 1.25 MG: 1.25 SOLUTION RESPIRATORY (INHALATION) at 12:21

## 2024-01-28 RX ADMIN — CEFTRIAXONE SODIUM 1000 MG: 10 INJECTION, POWDER, FOR SOLUTION INTRAVENOUS at 05:01

## 2024-01-28 RX ADMIN — IPRATROPIUM BROMIDE 0.5 MG: 0.5 SOLUTION RESPIRATORY (INHALATION) at 16:06

## 2024-01-28 RX ADMIN — FORMOTEROL FUMARATE DIHYDRATE 20 MCG: 20 SOLUTION RESPIRATORY (INHALATION) at 19:51

## 2024-01-28 RX ADMIN — IPRATROPIUM BROMIDE 0.5 MG: 0.5 SOLUTION RESPIRATORY (INHALATION) at 07:43

## 2024-01-28 RX ADMIN — METHYLPREDNISOLONE SODIUM SUCCINATE 40 MG: 40 INJECTION, POWDER, FOR SOLUTION INTRAMUSCULAR; INTRAVENOUS at 09:50

## 2024-01-28 RX ADMIN — METHYLPREDNISOLONE SODIUM SUCCINATE 40 MG: 40 INJECTION, POWDER, FOR SOLUTION INTRAMUSCULAR; INTRAVENOUS at 00:01

## 2024-01-28 RX ADMIN — METHYLPREDNISOLONE SODIUM SUCCINATE 40 MG: 40 INJECTION, POWDER, FOR SOLUTION INTRAMUSCULAR; INTRAVENOUS at 18:02

## 2024-01-28 RX ADMIN — PRAVASTATIN SODIUM 80 MG: 80 TABLET ORAL at 18:02

## 2024-01-28 RX ADMIN — IPRATROPIUM BROMIDE 0.5 MG: 0.5 SOLUTION RESPIRATORY (INHALATION) at 12:21

## 2024-01-28 RX ADMIN — LEVALBUTEROL HYDROCHLORIDE 1.25 MG: 1.25 SOLUTION RESPIRATORY (INHALATION) at 19:51

## 2024-01-28 RX ADMIN — LEVALBUTEROL HYDROCHLORIDE 1.25 MG: 1.25 SOLUTION RESPIRATORY (INHALATION) at 07:43

## 2024-01-28 NOTE — PROGRESS NOTES
[facility]  Progress Note: Critical Care  Name: Natalio Hartmann Jr. 68 y.o. male I MRN: [id number]  Unit/Bed#: [facility]U 202-01 I Date of Admission: 1/27/2024   Date of Service: 1/28/2024 I Hospital Day: 1    Assessment/Plan   Neuro:   No active issues     CV:   Diagnosis: HFpEF  5'23 TTE: Left ventricular ejection fraction is 20%. Systolic function is severely reduced. There is severe global hypokinesis with regional variation.   Follows with cardiology   Nonobstructive CAD by cardiac cath   Chronic systolic HF 2/2 LBBB and dilated CM  Unable to tolerate GDMT 2/2 ARAM and hypotension     Plan:   Weight appears to be elevated from office visits (1/10 9g lb, 1/22 103 lbs)  Fluid restriction, salt restriction   Daily weights   Accurate I/O   Continue statin   Continue Torsemide.     Pulm:  Diagnosis: Acute on chronic hypoxemic and hypercapneic resp failure 2/2 excerebration of end stage COPD  Home O2 requirements range from 3-6 L depending on if at rest vs exertion   On Trilogy vent at night 4L O2  Requiring continous BiPAP on admission   S/P CTX, azithro, duonebs and solmedrol 125 in ED  Plan:   Budesnoide nebs BID  Periformist BID   Atrovent-xopenex TID  Solumedrol 40 Q8H  Resp protocol   Currently on BiPAP - wean as able   VBG - pH 7.286.  Patient has required intubation in the past and is agreeable      GI:   No active issues     :   Diagnosis: ARAM   No hx of CKD, baseline Cr 0.6-0.75  POA, elevated Cr 1.06  Plan:   Cr 1.06 -> 0.8.  Resolved.  Diagnosis: Chronic hyponatremia   Follows with nephrology   Hyponatremia felt to multifactorial in the setting of volume overload and component of SIADH  Not on salt tablets  Plan:   Continue fluid restriction 1.5L  Continue demadex 40 qd   Monitor BMP daily  Diagnosis: Hx of prostate cancer s/p TURP(2021), BPH  Plan:   Continue home flomax      F/E/N:    Fluids: S/P 1 L NS bolus in ed   Electrolytes: monitor/replete PRN  Nutrition: po  diet 1.5 fluid restriction, 2gm salt restrition when able to come off Bipap     Heme/Onc:   DVT ppx: lovenox   Diagnosis: Chronic anemia  Baseline Hgb around 10   Hgb 10.7 on admission   Plan:  HH stable and at baseline   Continue B12     Endo:   Diagnosis: DM, A1c 6.5  Patient prescribed novolog 3 U TID AC however reports he does not take this   Plan:  SSI while IP   Goal -180     ID:   Diagnosis: Leukocytosis  POA WBC 16  CXR wet read concerning for RML PNA   Patient afebrile  Procal negative  S/p CTX and azithro in ED for CPAP suspicion   Plan:  Continue azithro per COPD treatment -> Dced.  Continue CTX for CAP  Trend WBC and fever curve   COVID/Flu/RSV negative.  Check sputum culture      MSK/Skin:   Diagnosis: Cachexia   Plan:   PT/OT when able   OOB when able    Disposition: Critical care    ICU Core Measures     A: Assess, Prevent, and Manage Pain Has pain been assessed? Yes  Need for changes to pain regimen? No   B: Both SAT/SAT  N/A   C: Choice of Sedation RASS Goal: 0 Alert and Calm  Need for changes to sedation or analgesia regimen? No   D: Delirium CAM-ICU: Negative   E: Early Mobility  Plan for early mobility? Yes   F: Family Engagement Plan for family engagement today? Yes       Antibiotic Review: Patient on appropriate coverage based on culture data.       Prophylaxis:  VTE VTE covered by:  enoxaparin, Subcutaneous, 40 mg at 01/27/24 1035       Stress Ulcer  not ordered        Significant 24hr Events     24hr events: No acute events overnight. Patient seen and examined. He feels well overall without acute complaints aside from his continued shortness of breath.     Subjective     Review of Systems   Constitutional:  Negative for chills, fatigue and fever.   HENT:  Negative for sore throat.    Eyes:  Negative for visual disturbance.   Respiratory:  Positive for shortness of breath. Negative for cough.    Cardiovascular:  Negative for chest pain, palpitations and leg swelling.   Gastrointestinal:   Negative for abdominal distention, abdominal pain, constipation, diarrhea, nausea and vomiting.   Endocrine: Negative for polyuria.   Genitourinary:  Negative for hematuria.   Musculoskeletal:  Negative for arthralgias.   Neurological:  Negative for dizziness and headaches.        Objective                            Vitals I/O      Most Recent Min/Max in 24hrs   Temp 97.7 °F (36.5 °C) Temp  Min: 97.6 °F (36.4 °C)  Max: 99.3 °F (37.4 °C)   Pulse 80 Pulse  Min: 80  Max: 118   Resp 20 Resp  Min: 20  Max: 30   BP 91/63 BP  Min: 82/61  Max: 118/63   O2 Sat 97 % SpO2  Min: 95 %  Max: 100 %      Intake/Output Summary (Last 24 hours) at 1/28/2024 0616  Last data filed at 1/28/2024 0501  Gross per 24 hour   Intake 1000 ml   Output 650 ml   Net 350 ml       Diet Cardiovascular; Sodium 2 GM; Fluid Restriction 1500 ML    Invasive Monitoring           Physical Exam   Physical Exam  Vitals reviewed.   Eyes:      Pupils: Pupils are equal, round, and reactive to light.   Skin:     General: Skin is warm.      Capillary Refill: Capillary refill takes less than 2 seconds.   HENT:      Head: Normocephalic.      Mouth/Throat:      Mouth: Mucous membranes are moist.      Comments: On BIPAP  Neck:   no midline tenderness Cardiovascular:      Rate and Rhythm: Normal rate.      Pulses: Normal pulses.   Musculoskeletal:      Right lower leg: No edema.      Left lower leg: No edema.   Abdominal:      Palpations: Abdomen is soft.   Constitutional:       Appearance: He is well-developed.   Pulmonary:      Effort: Pulmonary effort is normal.      Breath sounds: Wheezing present.      Comments: B/L expiratory wheezing  Neurological:      General: No focal deficit present.      Mental Status: He is oriented to person, place and time.            Diagnostic Studies      EKG: Reviewed  Imaging: Reviewed I have personally reviewed pertinent reports.       Medications:  Scheduled PRN   budesonide, 1 mg, Daily  cefTRIAXone, 1,000 mg,  Q24H  chlorhexidine, 15 mL, Q12H GABE  cyanocobalamin, 1,000 mcg, Daily  enoxaparin, 40 mg, Daily  ergocalciferol, 50,000 Units, Weekly  formoterol, 20 mcg, Q12H  ipratropium, 0.5 mg, 4x Daily  levalbuterol, 1.25 mg, 4x Daily  melatonin, 6 mg, HS  methylPREDNISolone sodium succinate, 40 mg, Q8H  pravastatin, 80 mg, Daily With Dinner  torsemide, 40 mg, Daily      sodium chloride, 1 spray, Q1H PRN       Continuous          Labs:    CBC    Recent Labs     01/27/24  0647   WBC 16.96*   HGB 10.7*   HCT 35.2*        BMP    Recent Labs     01/27/24  0647 01/28/24  0421   SODIUM 133* 133*   K 4.2 3.9   CL 85* 90*   CO2 41* 38*   AGAP 7 5   BUN 23 32*   CREATININE 1.06 0.80   CALCIUM 9.4 8.7       Coags    No recent results     Additional Electrolytes  Recent Labs     01/28/24  0421   MG 2.3   PHOS 3.8          Blood Gas    No recent results  Recent Labs     01/27/24  1138   PHVEN 7.286*   JHE7ZCN 85.1*   PO2VEN 69.6*   BHY8GAM 39.6*   BEVEN 10.5   E3OECMP 89.7*    LFTs  Recent Labs     01/28/24  0421   ALT 7   AST 14   ALKPHOS 56   ALB 3.4*   TBILI 0.49       Infectious  Recent Labs     01/27/24  0647   PROCALCITONI 0.16     Glucose  Recent Labs     01/27/24  0647 01/28/24  0421   GLUC 120 121             Yann Rene MD

## 2024-01-28 NOTE — PLAN OF CARE
Problem: Potential for Falls  Goal: Patient will remain free of falls  Description: INTERVENTIONS:  - Educate patient/family on patient safety including physical limitations  - Instruct patient to call for assistance with activity   - Consult OT/PT to assist with strengthening/mobility   - Keep Call bell within reach  - Keep bed low and locked with side rails adjusted as appropriate  - Keep care items and personal belongings within reach  - Initiate and maintain comfort rounds  - Make Fall Risk Sign visible to staff  - Offer Toileting every 2 Hours, in advance of need  - Initiate/Maintain bed alarm  - Obtain necessary fall risk management equipment:   - Apply yellow socks and bracelet for high fall risk patients  - Consider moving patient to room near nurses station  Outcome: Progressing     Problem: PAIN - ADULT  Goal: Verbalizes/displays adequate comfort level or baseline comfort level  Description: Interventions:  - Encourage patient to monitor pain and request assistance  - Assess pain using appropriate pain scale  - Administer analgesics based on type and severity of pain and evaluate response  - Implement non-pharmacological measures as appropriate and evaluate response  - Consider cultural and social influences on pain and pain management  - Notify physician/advanced practitioner if interventions unsuccessful or patient reports new pain  Outcome: Progressing     Problem: Prexisting or High Potential for Compromised Skin Integrity  Goal: Skin integrity is maintained or improved  Description: INTERVENTIONS:  - Identify patients at risk for skin breakdown  - Assess and monitor skin integrity  - Assess and monitor nutrition and hydration status  - Monitor labs   - Assess for incontinence   - Turn and reposition patient  - Assist with mobility/ambulation  - Relieve pressure over bony prominences  - Avoid friction and shearing  - Provide appropriate hygiene as needed including keeping skin clean and dry  -  Evaluate need for skin moisturizer/barrier cream  - Collaborate with interdisciplinary team   - Patient/family teaching  - Consider wound care consult   Outcome: Progressing     Problem: Nutrition/Hydration-ADULT  Goal: Nutrient/Hydration intake appropriate for improving, restoring or maintaining nutritional needs  Description: Monitor and assess patient's nutrition/hydration status for malnutrition. Collaborate with interdisciplinary team and initiate plan and interventions as ordered.  Monitor patient's weight and dietary intake as ordered or per policy. Utilize nutrition screening tool and intervene as necessary. Determine patient's food preferences and provide high-protein, high-caloric foods as appropriate.     INTERVENTIONS:  - Monitor oral intake, urinary output, labs, and treatment plans  - Assess nutrition and hydration status and recommend course of action  - Evaluate amount of meals eaten  - Assist patient with eating if necessary   - Allow adequate time for meals  - Recommend/ encourage appropriate diets, oral nutritional supplements, and vitamin/mineral supplements  - Order, calculate, and assess calorie counts as needed  - Recommend, monitor, and adjust tube feedings and TPN/PPN based on assessed needs  - Assess need for intravenous fluids  - Provide specific nutrition/hydration education as appropriate  - Include patient/family/caregiver in decisions related to nutrition  Outcome: Progressing

## 2024-01-29 LAB
ALBUMIN SERPL BCP-MCNC: 3.7 G/DL (ref 3.5–5)
ALP SERPL-CCNC: 61 U/L (ref 34–104)
ALT SERPL W P-5'-P-CCNC: 19 U/L (ref 7–52)
ANION GAP SERPL CALCULATED.3IONS-SCNC: 7 MMOL/L
AST SERPL W P-5'-P-CCNC: 30 U/L (ref 13–39)
BILIRUB SERPL-MCNC: 0.35 MG/DL (ref 0.2–1)
BUN SERPL-MCNC: 34 MG/DL (ref 5–25)
CALCIUM SERPL-MCNC: 9.3 MG/DL (ref 8.4–10.2)
CHLORIDE SERPL-SCNC: 90 MMOL/L (ref 96–108)
CO2 SERPL-SCNC: 38 MMOL/L (ref 21–32)
CREAT SERPL-MCNC: 0.77 MG/DL (ref 0.6–1.3)
ERYTHROCYTE [DISTWIDTH] IN BLOOD BY AUTOMATED COUNT: 13.1 % (ref 11.6–15.1)
GFR SERPL CREATININE-BSD FRML MDRD: 94 ML/MIN/1.73SQ M
GLUCOSE SERPL-MCNC: 151 MG/DL (ref 65–140)
HCT VFR BLD AUTO: 31 % (ref 36.5–49.3)
HGB BLD-MCNC: 9.3 G/DL (ref 12–17)
MAGNESIUM SERPL-MCNC: 2.3 MG/DL (ref 1.9–2.7)
MCH RBC QN AUTO: 29.5 PG (ref 26.8–34.3)
MCHC RBC AUTO-ENTMCNC: 30 G/DL (ref 31.4–37.4)
MCV RBC AUTO: 98 FL (ref 82–98)
NRBC BLD AUTO-RTO: 0 /100 WBCS
PHOSPHATE SERPL-MCNC: 3.4 MG/DL (ref 2.3–4.1)
PLATELET # BLD AUTO: 220 THOUSANDS/UL (ref 149–390)
PMV BLD AUTO: 9.5 FL (ref 8.9–12.7)
POTASSIUM SERPL-SCNC: 4.5 MMOL/L (ref 3.5–5.3)
PROT SERPL-MCNC: 6.4 G/DL (ref 6.4–8.4)
RBC # BLD AUTO: 3.15 MILLION/UL (ref 3.88–5.62)
SODIUM SERPL-SCNC: 135 MMOL/L (ref 135–147)
WBC # BLD AUTO: 8.16 THOUSAND/UL (ref 4.31–10.16)

## 2024-01-29 PROCEDURE — 94640 AIRWAY INHALATION TREATMENT: CPT

## 2024-01-29 PROCEDURE — 94664 DEMO&/EVAL PT USE INHALER: CPT

## 2024-01-29 PROCEDURE — 94760 N-INVAS EAR/PLS OXIMETRY 1: CPT

## 2024-01-29 PROCEDURE — NC001 PR NO CHARGE: Performed by: INTERNAL MEDICINE

## 2024-01-29 PROCEDURE — 83735 ASSAY OF MAGNESIUM: CPT

## 2024-01-29 PROCEDURE — 80053 COMPREHEN METABOLIC PANEL: CPT

## 2024-01-29 PROCEDURE — 99233 SBSQ HOSP IP/OBS HIGH 50: CPT | Performed by: INTERNAL MEDICINE

## 2024-01-29 PROCEDURE — 94660 CPAP INITIATION&MGMT: CPT

## 2024-01-29 PROCEDURE — 85027 COMPLETE CBC AUTOMATED: CPT

## 2024-01-29 PROCEDURE — 84100 ASSAY OF PHOSPHORUS: CPT

## 2024-01-29 RX ORDER — HEPARIN SODIUM 5000 [USP'U]/ML
5000 INJECTION, SOLUTION INTRAVENOUS; SUBCUTANEOUS EVERY 8 HOURS SCHEDULED
Status: DISCONTINUED | OUTPATIENT
Start: 2024-01-30 | End: 2024-02-04 | Stop reason: HOSPADM

## 2024-01-29 RX ORDER — SENNOSIDES 8.6 MG
1 TABLET ORAL
Status: DISCONTINUED | OUTPATIENT
Start: 2024-01-29 | End: 2024-02-02

## 2024-01-29 RX ORDER — TAMSULOSIN HYDROCHLORIDE 0.4 MG/1
0.4 CAPSULE ORAL
Status: DISCONTINUED | OUTPATIENT
Start: 2024-01-29 | End: 2024-02-04 | Stop reason: HOSPADM

## 2024-01-29 RX ORDER — TORSEMIDE 20 MG/1
40 TABLET ORAL DAILY
Status: DISCONTINUED | OUTPATIENT
Start: 2024-01-29 | End: 2024-02-02

## 2024-01-29 RX ORDER — BUDESONIDE 0.5 MG/2ML
1 INHALANT ORAL
Status: DISCONTINUED | OUTPATIENT
Start: 2024-01-29 | End: 2024-02-04 | Stop reason: HOSPADM

## 2024-01-29 RX ORDER — METHYLPREDNISOLONE SODIUM SUCCINATE 40 MG/ML
40 INJECTION, POWDER, LYOPHILIZED, FOR SOLUTION INTRAMUSCULAR; INTRAVENOUS EVERY 12 HOURS SCHEDULED
Status: DISCONTINUED | OUTPATIENT
Start: 2024-01-29 | End: 2024-01-30

## 2024-01-29 RX ADMIN — BUDESONIDE 1 MG: 0.5 INHALANT RESPIRATORY (INHALATION) at 20:29

## 2024-01-29 RX ADMIN — CEFTRIAXONE SODIUM 1000 MG: 10 INJECTION, POWDER, FOR SOLUTION INTRAVENOUS at 06:42

## 2024-01-29 RX ADMIN — FORMOTEROL FUMARATE DIHYDRATE 20 MCG: 20 SOLUTION RESPIRATORY (INHALATION) at 07:07

## 2024-01-29 RX ADMIN — IPRATROPIUM BROMIDE 0.5 MG: 0.5 SOLUTION RESPIRATORY (INHALATION) at 07:07

## 2024-01-29 RX ADMIN — IPRATROPIUM BROMIDE 0.5 MG: 0.5 SOLUTION RESPIRATORY (INHALATION) at 16:47

## 2024-01-29 RX ADMIN — METHYLPREDNISOLONE SODIUM SUCCINATE 40 MG: 40 INJECTION, POWDER, FOR SOLUTION INTRAMUSCULAR; INTRAVENOUS at 01:00

## 2024-01-29 RX ADMIN — LEVALBUTEROL HYDROCHLORIDE 1.25 MG: 1.25 SOLUTION RESPIRATORY (INHALATION) at 13:42

## 2024-01-29 RX ADMIN — LEVALBUTEROL HYDROCHLORIDE 1.25 MG: 1.25 SOLUTION RESPIRATORY (INHALATION) at 07:07

## 2024-01-29 RX ADMIN — CYANOCOBALAMIN TAB 500 MCG 1000 MCG: 500 TAB at 08:57

## 2024-01-29 RX ADMIN — CHLORHEXIDINE GLUCONATE 15 ML: 1.2 SOLUTION ORAL at 08:57

## 2024-01-29 RX ADMIN — IPRATROPIUM BROMIDE 0.5 MG: 0.5 SOLUTION RESPIRATORY (INHALATION) at 20:29

## 2024-01-29 RX ADMIN — TAMSULOSIN HYDROCHLORIDE 0.4 MG: 0.4 CAPSULE ORAL at 16:40

## 2024-01-29 RX ADMIN — IPRATROPIUM BROMIDE 0.5 MG: 0.5 SOLUTION RESPIRATORY (INHALATION) at 13:42

## 2024-01-29 RX ADMIN — LEVALBUTEROL HYDROCHLORIDE 1.25 MG: 1.25 SOLUTION RESPIRATORY (INHALATION) at 16:47

## 2024-01-29 RX ADMIN — METHYLPREDNISOLONE SODIUM SUCCINATE 40 MG: 40 INJECTION, POWDER, FOR SOLUTION INTRAMUSCULAR; INTRAVENOUS at 20:49

## 2024-01-29 RX ADMIN — METHYLPREDNISOLONE SODIUM SUCCINATE 40 MG: 40 INJECTION, POWDER, FOR SOLUTION INTRAMUSCULAR; INTRAVENOUS at 08:57

## 2024-01-29 RX ADMIN — ENOXAPARIN SODIUM 40 MG: 40 INJECTION SUBCUTANEOUS at 08:57

## 2024-01-29 RX ADMIN — LEVALBUTEROL HYDROCHLORIDE 1.25 MG: 1.25 SOLUTION RESPIRATORY (INHALATION) at 20:29

## 2024-01-29 RX ADMIN — BUDESONIDE 1 MG: 0.5 INHALANT RESPIRATORY (INHALATION) at 07:07

## 2024-01-29 RX ADMIN — TORSEMIDE 40 MG: 20 TABLET ORAL at 08:57

## 2024-01-29 RX ADMIN — PRAVASTATIN SODIUM 80 MG: 80 TABLET ORAL at 16:40

## 2024-01-29 NOTE — TELEPHONE ENCOUNTER
Please Review. Refill must be reviewed and completed by the office or provider. The refill is unable to be approved / deny by the medication management team.

## 2024-01-29 NOTE — PROGRESS NOTES
Critical Care Interval Transfer Note:    Please refer to progress note from earlier today for full details.     Barriers to discharge:   Pending clinical improvement      Consults: IP CONSULT TO CASE MANAGEMENT  IP CONSULT TO PULMONOLOGY    Recommended to review admission imaging for incidental findings and document in discharge navigator: Chart reviewed, no known incidental findings noted at this time.      Discharge Plan: Anticipate discharge in 48-72 hrs to discharge location to be determined pending rehab evaluations.    PT Recommendations: Home with outpatient rehabilitation  OT Recommendations: Home with home health rehabilitation    Patient seen and evaluated by Critical Care today and deemed to be appropriate for transfer to Stepdown Level 2. Spoke to Dr. Remi Rodriguez from [facility] to accept transfer. Critical care can be contacted via [employer] with any questions or concerns.    HOSPITAL/ICU COURSE:   Natalio Hartmann is a 67 y/o M end stage COPD ON 3-6L at rest/exertion and Trilogy vent at night 4L, HFrEF 30%, G1DD, DM, BPH, chronic hyponatremia 2/2 SIADH, prostate cancer s/p TURP(2021), BIBA 2/2 SOB on CPAP 15L 100%. pH 7.284 w hypercapneia hypoxia and hypercarbia. Patient placed on BiPAP in ED 14/6 100%. Afebrile, leukocytosis of 16.96. CXR concerning for RML PNA. Patient was given dubo nebs, solumedrol, CTX, azithro and 1L NS bolus. Patient admitted to SD1 for COPD exacerbation and CAP requiring continuous BiPAP. Patient has been weaned to HFNC 50% 55L during day time and AVAPS at night. He continues on nebs, solumedrol 40 q12H and D3 of 3 of CTX for CAP. Blood and sputum cultures have been negative to date. He can be transitioned to MF O2 during the day and continue on AVAPS at night. Pulm consult service will continue to follow. AAOx3

## 2024-01-29 NOTE — PROGRESS NOTES
[facility]  Progress Note: Critical Care  Name: Natalio Hartmann Jr. 68 y.o. male I MRN: [id number]  Unit/Bed#: ICCU 202-01 I Date of Admission: 1/27/2024   Date of Service: 1/29/2024 I Hospital Day: 2    Assessment/Plan   Neuro:   No active issues     CV:   Diagnosis: HFpEF  5'23 TTE: Left ventricular ejection fraction is 20%. Systolic function is severely reduced. There is severe global hypokinesis with regional variation.   Follows with cardiology   Nonobstructive CAD by cardiac cath   Chronic systolic HF 2/2 LBBB and dilated CM  Unable to tolerate GDMT 2/2 ARAM and hypotension episodes in the past    (lower than previous)  Plan:   Fluid restriction, salt restriction   Daily weights   Accurate I/O   Continue statin   Continue Torsemide - patient has not received 2/2 BP hold parameters, changed to hold for SBP<100     Pulm:  Diagnosis: Acute on chronic hypoxemic and hypercapneic resp failure 2/2 excerebration of end stage COPD  Home O2 requirements range from 3-6 L depending on if at rest vs exertion   On Trilogy vent at night 4L O2  Requiring continous BiPAP on admission   S/P CTX, azithro, duonebs and solmedrol 125 in ED  Plan:   Budesnoide nebs QD  Periformist BID   Atrovent-xopenex QID  Day 3 of steriods: Solumedrol 40 Q8H -> wean to q12h today  HF/MF during day time, BiPAP qhs     GI:   No active issues     :   Diagnosis: ARAM   No hx of CKD, baseline Cr 0.6-0.75  POA, elevated Cr 1.06  Plan:   Cr 1.06 -> 0.8.  Resolved.  Diagnosis: Chronic hyponatremia   Follows with nephrology   Hyponatremia felt to multifactorial in the setting of volume overload and component of SIADH  Not on salt tablets  Plan:   Continue fluid restriction 1.5L  Continue demadex 40 qd   Monitor BMP daily  Diagnosis: Hx of prostate cancer s/p TURP(2021), BPH  Plan:   Restart home flomax      F/E/N:    Fluids: S/P 1 L NS bolus in ED - not on any IVF   Electrolytes: monitor/replete PRN  Nutrition: po  diet 1.5 fluid restriction, 2gm salt restrition      Heme/Onc:   DVT ppx: Lovenox   Diagnosis: Chronic anemia  Baseline Hgb around 10   Hgb 10.7 on admission   Plan:  HH stable and at baseline   Continue B12     Endo:   Diagnosis: DM, A1c 6.5  Patient prescribed novolog 3 U TID AC however reports he does not take this   Plan:  SSI while IP   Goal -180     ID:   Diagnosis: Community acquired Pneumonia RML  POA WBC 16  CXR w focal opacity concerning for for RML PNA   Patient afebrile  Procal negative  COVID/Flu/RSV negative  Strep pneumo and legionella urine Ag negative  S/p CTX and azithro in ED for CPAP suspicion   Plan:  Continue CTX for CAP (day 3 of 5)  Trend WBC and fever curve      MSK/Skin:   Diagnosis: Cachexia   Plan:   PT/OT when able   OOB when able  LDA:  Peripheral IV 01/27/24 Forearm   Peripheral IV 01/27/24 Left Antecubital   Peripheral IV 01/27/24 Right Antecubital      Disposition: Stepdown Level 1    ICU Core Measures     A: Assess, Prevent, and Manage Pain Has pain been assessed? NA  Need for changes to pain regimen? NA   B: Both SAT/SAT  N/A   C: Choice of Sedation RASS Goal: 0 Alert and Calm or N/A patient not on sedation  Need for changes to sedation or analgesia regimen? NA   D: Delirium CAM-ICU: Negative   E: Early Mobility  Plan for early mobility? Yes   F: Family Engagement Plan for family engagement today? Yes       Antibiotic Review: Patient on appropriate coverage based on culture data.       Prophylaxis:  VTE VTE covered by:  enoxaparin, Subcutaneous, 40 mg at 01/27/24 1035       Stress Ulcer  not ordered        Significant 24hr Events   Hospital Course:   67 y/o M end stage COPD ON 3-6L at rest/exertion and Trilogy vent at night 4L, HFrEF 30%, G1DD, DM, BPH, chronic hyponatremia 2/2 SIADH, prostate cancer s/p TURP(2021), BIBA ON 1/27 2/2 SOB on CPAP 15L 100%. pH 7.284 w hypercapneia hypoxia and hypercarbia. Patient placed on BiPAP in ED 14/6 100%. Afebrile, leukocytosis of 16.96,  . Initital trop elevation of 61. CXR concerning for RML PNA. Patient was given dubo nebs, solumedrol, CTX, azithro and 1L NS bolus. Patient admitted to SD1 for COPD exacerbation and CAP requiring continuous BiPAP. Patient has been weaned to HFNC during day time and BiPAP at night. He continues on nebs, solumedrol 40 q8H and D3 of CTX for CAP.     24hr events: no overnight events      Subjective     Review of Systems   Constitutional:  Negative for chills and fever.   HENT:  Negative for ear pain and sore throat.    Eyes:  Negative for pain and visual disturbance.   Respiratory:  Negative for cough and shortness of breath.    Cardiovascular:  Negative for chest pain and palpitations.   Gastrointestinal:  Negative for abdominal pain and vomiting.   Genitourinary:  Negative for dysuria and hematuria.   Musculoskeletal:  Negative for arthralgias and back pain.   Skin:  Negative for color change and rash.   Neurological:  Negative for seizures and syncope.   All other systems reviewed and are negative.       Objective                            Vitals I/O      Most Recent Min/Max in 24hrs   Temp (!) 97.4 °F (36.3 °C) Temp  Min: 97.4 °F (36.3 °C)  Max: 98.8 °F (37.1 °C)   Pulse 82 Pulse  Min: 82  Max: 106   Resp 19 Resp  Min: 19  Max: 21   /76 BP  Min: 94/67  Max: 108/71   O2 Sat 100 % SpO2  Min: 96 %  Max: 100 %      Intake/Output Summary (Last 24 hours) at 1/29/2024 0617  Last data filed at 1/29/2024 0453  Gross per 24 hour   Intake 272 ml   Output 825 ml   Net -553 ml       Diet Cardiovascular; Sodium 2 GM; Fluid Restriction 1500 ML    Invasive Monitoring           Physical Exam   Physical Exam  Eyes:      Extraocular Movements: Extraocular movements intact.      Pupils: Pupils are equal, round, and reactive to light.   Skin:     General: Skin is warm and dry.   HENT:      Head: Normocephalic and atraumatic.   Cardiovascular:      Rate and Rhythm: Normal rate and regular rhythm.      Pulses: Normal pulses.    Musculoskeletal:      Right lower leg: No edema.      Left lower leg: No edema.   Abdominal: General: Bowel sounds are normal.      Palpations: Abdomen is soft.      Tenderness: There is no abdominal tenderness.   Constitutional:       Appearance: He is underweight. He is ill-appearing.      Interventions: He is not intubated.  Pulmonary:      Effort: He is not intubated.      Breath sounds: Decreased air movement present. Decreased breath sounds and wheezing present.      Comments: Distant breath sounds  Neurological:      Mental Status: He is oriented to person, place and time.            Diagnostic Studies      EKG: LBBB  Imaging: I have personally reviewed pertinent reports.   and I have personally reviewed pertinent films in PACS     Medications:  Scheduled PRN   budesonide, 1 mg, Daily  cefTRIAXone, 1,000 mg, Q24H  chlorhexidine, 15 mL, Q12H GABE  cyanocobalamin, 1,000 mcg, Daily  enoxaparin, 40 mg, Daily  ergocalciferol, 50,000 Units, Weekly  formoterol, 20 mcg, Q12H  ipratropium, 0.5 mg, 4x Daily  levalbuterol, 1.25 mg, 4x Daily  melatonin, 6 mg, HS  methylPREDNISolone sodium succinate, 40 mg, Q8H  pravastatin, 80 mg, Daily With Dinner  torsemide, 40 mg, Daily      sodium chloride, 1 spray, Q1H PRN       Continuous          Labs:    CBC    Recent Labs     01/28/24  0821 01/29/24  0449   WBC 12.89* 8.16   HGB 8.3* 9.3*   HCT 27.4* 31.0*    220   BANDSPCT 1  --      BMP    Recent Labs     01/28/24 0421 01/29/24  0449   SODIUM 133* 135   K 3.9 4.5   CL 90* 90*   CO2 38* 38*   AGAP 5 7   BUN 32* 34*   CREATININE 0.80 0.77   CALCIUM 8.7 9.3       Coags    No recent results     Additional Electrolytes  Recent Labs     01/28/24  0421 01/29/24  0449   MG 2.3 2.3   PHOS 3.8 3.4          Blood Gas    No recent results  Recent Labs     01/27/24  1138   PHVEN 7.286*   [id number] 85.1*   PO2VEN 69.6*   [id number] 39.6*   BEVEN 10.5   [id number] 89.7*    LFTs  Recent Labs     01/28/24 0421 01/29/24  0449   ALT 7 19   AST  14 30   ALKPHOS 56 61   ALB 3.4* 3.7   TBILI 0.49 0.35       Infectious  Recent Labs     01/27/24  0647   PROCALCITONI 0.16     Glucose  Recent Labs     01/27/24  0647 01/28/24  0421 01/29/24  0449   GLUC 120 121 151*             Olga Goodman DO

## 2024-01-30 ENCOUNTER — APPOINTMENT (INPATIENT)
Dept: RADIOLOGY | Facility: HOSPITAL | Age: 69
DRG: 189 | End: 2024-01-30
Payer: MEDICARE

## 2024-01-30 LAB
BASOPHILS # BLD AUTO: 0 THOUSANDS/ÂΜL (ref 0–0.1)
BASOPHILS NFR BLD AUTO: 0 % (ref 0–1)
BUN SERPL-MCNC: 37 MG/DL (ref 5–25)
CALCIUM SERPL-MCNC: 9.1 MG/DL (ref 8.4–10.2)
CHLORIDE SERPL-SCNC: 87 MMOL/L (ref 96–108)
CO2 SERPL-SCNC: >45 MMOL/L (ref 21–32)
CREAT SERPL-MCNC: 0.98 MG/DL (ref 0.6–1.3)
EOSINOPHIL # BLD AUTO: 0.01 THOUSAND/ÂΜL (ref 0–0.61)
EOSINOPHIL NFR BLD AUTO: 0 % (ref 0–6)
ERYTHROCYTE [DISTWIDTH] IN BLOOD BY AUTOMATED COUNT: 12.8 % (ref 11.6–15.1)
GFR SERPL CREATININE-BSD FRML MDRD: 80 ML/MIN/1.73SQ M
GLUCOSE SERPL-MCNC: 146 MG/DL (ref 65–140)
HCT VFR BLD AUTO: 32.5 % (ref 36.5–49.3)
HGB BLD-MCNC: 9.5 G/DL (ref 12–17)
IMM GRANULOCYTES # BLD AUTO: 0.03 THOUSAND/UL (ref 0–0.2)
IMM GRANULOCYTES NFR BLD AUTO: 1 % (ref 0–2)
LYMPHOCYTES # BLD AUTO: 0.23 THOUSANDS/ÂΜL (ref 0.6–4.47)
LYMPHOCYTES NFR BLD AUTO: 4 % (ref 14–44)
MAGNESIUM SERPL-MCNC: 2.1 MG/DL (ref 1.9–2.7)
MCH RBC QN AUTO: 29.1 PG (ref 26.8–34.3)
MCHC RBC AUTO-ENTMCNC: 29.2 G/DL (ref 31.4–37.4)
MCV RBC AUTO: 100 FL (ref 82–98)
MONOCYTES # BLD AUTO: 0.39 THOUSAND/ÂΜL (ref 0.17–1.22)
MONOCYTES NFR BLD AUTO: 7 % (ref 4–12)
NEUTROPHILS # BLD AUTO: 4.97 THOUSANDS/ÂΜL (ref 1.85–7.62)
NEUTS SEG NFR BLD AUTO: 88 % (ref 43–75)
NRBC BLD AUTO-RTO: 0 /100 WBCS
PHOSPHATE SERPL-MCNC: 3.6 MG/DL (ref 2.3–4.1)
PLATELET # BLD AUTO: 220 THOUSANDS/UL (ref 149–390)
PMV BLD AUTO: 9.8 FL (ref 8.9–12.7)
POTASSIUM SERPL-SCNC: 3.7 MMOL/L (ref 3.5–5.3)
RBC # BLD AUTO: 3.26 MILLION/UL (ref 3.88–5.62)
SODIUM SERPL-SCNC: 139 MMOL/L (ref 135–147)
WBC # BLD AUTO: 5.63 THOUSAND/UL (ref 4.31–10.16)

## 2024-01-30 PROCEDURE — 94760 N-INVAS EAR/PLS OXIMETRY 1: CPT

## 2024-01-30 PROCEDURE — 99232 SBSQ HOSP IP/OBS MODERATE 35: CPT | Performed by: INTERNAL MEDICINE

## 2024-01-30 PROCEDURE — 83735 ASSAY OF MAGNESIUM: CPT | Performed by: STUDENT IN AN ORGANIZED HEALTH CARE EDUCATION/TRAINING PROGRAM

## 2024-01-30 PROCEDURE — 94760 N-INVAS EAR/PLS OXIMETRY 1: CPT | Performed by: SOCIAL WORKER

## 2024-01-30 PROCEDURE — 94640 AIRWAY INHALATION TREATMENT: CPT

## 2024-01-30 PROCEDURE — 71045 X-RAY EXAM CHEST 1 VIEW: CPT

## 2024-01-30 PROCEDURE — 80048 BASIC METABOLIC PNL TOTAL CA: CPT | Performed by: STUDENT IN AN ORGANIZED HEALTH CARE EDUCATION/TRAINING PROGRAM

## 2024-01-30 PROCEDURE — 84100 ASSAY OF PHOSPHORUS: CPT | Performed by: STUDENT IN AN ORGANIZED HEALTH CARE EDUCATION/TRAINING PROGRAM

## 2024-01-30 PROCEDURE — 94668 MNPJ CHEST WALL SBSQ: CPT

## 2024-01-30 PROCEDURE — 85025 COMPLETE CBC W/AUTO DIFF WBC: CPT | Performed by: STUDENT IN AN ORGANIZED HEALTH CARE EDUCATION/TRAINING PROGRAM

## 2024-01-30 PROCEDURE — 94660 CPAP INITIATION&MGMT: CPT

## 2024-01-30 PROCEDURE — 94664 DEMO&/EVAL PT USE INHALER: CPT

## 2024-01-30 RX ORDER — GUAIFENESIN 600 MG/1
1200 TABLET, EXTENDED RELEASE ORAL EVERY 12 HOURS SCHEDULED
Status: DISCONTINUED | OUTPATIENT
Start: 2024-01-30 | End: 2024-02-04 | Stop reason: HOSPADM

## 2024-01-30 RX ORDER — MAGNESIUM SULFATE HEPTAHYDRATE 40 MG/ML
2 INJECTION, SOLUTION INTRAVENOUS ONCE
Status: COMPLETED | OUTPATIENT
Start: 2024-01-30 | End: 2024-01-30

## 2024-01-30 RX ORDER — LEVALBUTEROL INHALATION SOLUTION 1.25 MG/3ML
1.25 SOLUTION RESPIRATORY (INHALATION)
Status: DISCONTINUED | OUTPATIENT
Start: 2024-01-30 | End: 2024-02-04 | Stop reason: HOSPADM

## 2024-01-30 RX ORDER — SODIUM CHLORIDE FOR INHALATION 3 %
4 VIAL, NEBULIZER (ML) INHALATION
Status: DISCONTINUED | OUTPATIENT
Start: 2024-01-30 | End: 2024-02-04 | Stop reason: HOSPADM

## 2024-01-30 RX ORDER — METHYLPREDNISOLONE SODIUM SUCCINATE 40 MG/ML
40 INJECTION, POWDER, LYOPHILIZED, FOR SOLUTION INTRAMUSCULAR; INTRAVENOUS EVERY 8 HOURS SCHEDULED
Status: DISCONTINUED | OUTPATIENT
Start: 2024-01-30 | End: 2024-02-01

## 2024-01-30 RX ORDER — SODIUM CHLORIDE FOR INHALATION 3 %
4 VIAL, NEBULIZER (ML) INHALATION
Status: DISCONTINUED | OUTPATIENT
Start: 2024-01-30 | End: 2024-01-30

## 2024-01-30 RX ADMIN — IPRATROPIUM BROMIDE 0.5 MG: 0.5 SOLUTION RESPIRATORY (INHALATION) at 08:12

## 2024-01-30 RX ADMIN — METHYLPREDNISOLONE SODIUM SUCCINATE 40 MG: 40 INJECTION, POWDER, FOR SOLUTION INTRAMUSCULAR; INTRAVENOUS at 09:18

## 2024-01-30 RX ADMIN — SODIUM CHLORIDE SOLN NEBU 3% 4 ML: 3 NEBU SOLN at 19:40

## 2024-01-30 RX ADMIN — GUAIFENESIN 1200 MG: 600 TABLET, EXTENDED RELEASE ORAL at 16:30

## 2024-01-30 RX ADMIN — SENNOSIDES 8.6 MG: 8.6 TABLET, FILM COATED ORAL at 21:26

## 2024-01-30 RX ADMIN — IPRATROPIUM BROMIDE 0.5 MG: 0.5 SOLUTION RESPIRATORY (INHALATION) at 11:59

## 2024-01-30 RX ADMIN — HEPARIN SODIUM 5000 UNITS: 5000 INJECTION INTRAVENOUS; SUBCUTANEOUS at 16:32

## 2024-01-30 RX ADMIN — BUDESONIDE 1 MG: 0.5 INHALANT RESPIRATORY (INHALATION) at 08:12

## 2024-01-30 RX ADMIN — METHYLPREDNISOLONE SODIUM SUCCINATE 40 MG: 40 INJECTION, POWDER, FOR SOLUTION INTRAMUSCULAR; INTRAVENOUS at 16:32

## 2024-01-30 RX ADMIN — CHLORHEXIDINE GLUCONATE 15 ML: 1.2 SOLUTION ORAL at 09:18

## 2024-01-30 RX ADMIN — HEPARIN SODIUM 5000 UNITS: 5000 INJECTION INTRAVENOUS; SUBCUTANEOUS at 05:07

## 2024-01-30 RX ADMIN — IPRATROPIUM BROMIDE 0.5 MG: 0.5 SOLUTION RESPIRATORY (INHALATION) at 16:06

## 2024-01-30 RX ADMIN — TAMSULOSIN HYDROCHLORIDE 0.4 MG: 0.4 CAPSULE ORAL at 16:30

## 2024-01-30 RX ADMIN — MELATONIN 6 MG: at 21:24

## 2024-01-30 RX ADMIN — LEVALBUTEROL HYDROCHLORIDE 1.25 MG: 1.25 SOLUTION RESPIRATORY (INHALATION) at 19:40

## 2024-01-30 RX ADMIN — LEVALBUTEROL HYDROCHLORIDE 1.25 MG: 1.25 SOLUTION RESPIRATORY (INHALATION) at 16:06

## 2024-01-30 RX ADMIN — LEVALBUTEROL HYDROCHLORIDE 1.25 MG: 1.25 SOLUTION RESPIRATORY (INHALATION) at 08:12

## 2024-01-30 RX ADMIN — TORSEMIDE 40 MG: 20 TABLET ORAL at 09:18

## 2024-01-30 RX ADMIN — CYANOCOBALAMIN TAB 500 MCG 1000 MCG: 500 TAB at 09:18

## 2024-01-30 RX ADMIN — HEPARIN SODIUM 5000 UNITS: 5000 INJECTION INTRAVENOUS; SUBCUTANEOUS at 21:24

## 2024-01-30 RX ADMIN — PRAVASTATIN SODIUM 80 MG: 80 TABLET ORAL at 16:30

## 2024-01-30 RX ADMIN — SODIUM CHLORIDE SOLN NEBU 3% 4 ML: 3 NEBU SOLN at 16:06

## 2024-01-30 RX ADMIN — METHYLPREDNISOLONE SODIUM SUCCINATE 40 MG: 40 INJECTION, POWDER, FOR SOLUTION INTRAMUSCULAR; INTRAVENOUS at 21:25

## 2024-01-30 RX ADMIN — MAGNESIUM SULFATE HEPTAHYDRATE 2 G: 40 INJECTION, SOLUTION INTRAVENOUS at 18:19

## 2024-01-30 RX ADMIN — LEVALBUTEROL HYDROCHLORIDE 1.25 MG: 1.25 SOLUTION RESPIRATORY (INHALATION) at 11:59

## 2024-01-30 RX ADMIN — IPRATROPIUM BROMIDE 0.5 MG: 0.5 SOLUTION RESPIRATORY (INHALATION) at 19:40

## 2024-01-30 RX ADMIN — CHLORHEXIDINE GLUCONATE 15 ML: 1.2 SOLUTION ORAL at 21:24

## 2024-01-30 RX ADMIN — BUDESONIDE 1 MG: 0.5 INHALANT RESPIRATORY (INHALATION) at 19:40

## 2024-01-30 NOTE — ASSESSMENT & PLAN NOTE
Malnutrition Findings:                                 BMI Findings:           Body mass index is 19.37 kg/m².

## 2024-01-30 NOTE — ASSESSMENT & PLAN NOTE
Malnutrition Findings:                        Nutritional and lifestyle changes discussed         BMI Findings:           Body mass index is 19.37 kg/m².

## 2024-01-30 NOTE — PROGRESS NOTES
[facility]  Progress Note  Name: Natalio Richardson I  MRN: [id number]  Unit/Bed#: ICCU 202-01 I Date of Admission: 1/27/2024   Date of Service: 1/30/2024 I Hospital Day: 3    Assessment/Plan   Pulmonary cachexia due to COPD (HCC)  Assessment & Plan  Malnutrition Findings:                                 BMI Findings:           Body mass index is 19.37 kg/m².       Chronic hypercapnia/KEVIN  Assessment & Plan  Patient on home BiPAP at bedtime    End-stage COPD with acute exacerbation (HCC)  Assessment & Plan  Patient with end-stage COPD on chronic hypoxic respiratory failure on home oxygen, chronically on prednisone prednisone 5 mg daily, budesonide/Perforomist twice daily, DuoNebs 4 times daily.   Follow-up with pulmonology as an outpatient  Presented with COPD exacerbation, CO2 retention and respiratory acidosis  Requiring high flow nasal cannula this morning    Hyperlipidemia  Assessment & Plan  Continue pravastatin 80 mg daily    Physical deconditioning  Assessment & Plan  Continue pt ot    Chronic hyponatremia  Assessment & Plan  Continue to monitor off salt tabs    Chronic anemia  Assessment & Plan  Patient with macrocytic anemia, continue on B12 supplementation    Chronic HFrEF (heart failure with reduced ejection fraction)   Assessment & Plan  Wt Readings from Last 3 Encounters:   01/30/24 48 kg (105 lb 14.4 oz)   01/22/24 46.7 kg (103 lb)   01/10/24 43.5 kg (96 lb)     Previous cardiac echo on 5/1/2023 with EF 20%  Reviewing outpatient cardiology note this is secondary to dilated cardiomyopathy and left bundle branch block  Maintained on diuretics continue torsemide 40 mg daily          Protein-calorie malnutrition (HCC)  Assessment & Plan  Malnutrition Findings:                        Nutritional and lifestyle changes discussed         BMI Findings:           Body mass index is 19.37 kg/m².       Pulmonary nodules/lesions, multiple  Assessment & Plan  Being followed by  pulmonary as an outpatient    Prostate cancer (HCC)  Assessment & Plan  Status post TURP    BPH with obstruction/lower urinary tract symptoms  Assessment & Plan  Continue on tamsulosin, patient with history of BPH status post TURP, monitor blood pressure    Goals of care, counseling/discussion  Assessment & Plan  Patient is a level 1 full code    Obstructive sleep apnea  Assessment & Plan  Patient with history of KEVIN, uses trilogy vent at bedtime with supplemental oxygen of 4 L    * Acute on chronic respiratory failure with hypoxia and hypercapnia (HCC)  Assessment & Plan  Patient is chronically on home oxygen between 4 to 6 L/min  Presented with severe hypoxia and shortness of breath  Placed on BiPAP in ED  Chest x-ray with mild pulmonary edema. Focal opacity in the right midlung which could represent pneumonia.     Patient is afebrile with leukocytosis  Patient currently on high flow nasal cannula this morning  Patient is full code  Likely secondary to end-stage COPD exacerbation              VTE Pharmacologic Prophylaxis:   Moderate Risk (Score 3-4) - Pharmacological DVT Prophylaxis Ordered: heparin.    Mobility:   Basic Mobility Inpatient Raw Score: 16  JH-HLM Goal: 5: Stand one or more mins  JH-HLM Achieved: 2: Bed activities/Dependent transfer  HLM Goal achieved. Continue to encourage appropriate mobility.    Patient Centered Rounds: I performed bedside rounds with nursing staff today.   Discussions with Specialists or Other Care Team Provider:     Education and Discussions with Family / Patient: Updated  (significant other) at bedside.    Total Time Spent on Date of Encounter in care of patient:  mins. This time was spent on one or more of the following: performing physical exam; counseling and coordination of care; obtaining or reviewing history; documenting in the medical record; reviewing/ordering tests, medications or procedures; communicating with other healthcare professionals and  discussing with patient's family/caregivers.    Current Length of Stay: 3 day(s)  Current Patient Status: Inpatient   Certification Statement: The patient will continue to require additional inpatient hospital stay due to resp failure  Discharge Plan: Anticipate discharge in >72 hrs to discharge location to be determined pending rehab evaluations.    Code Status: Level 1 - Full Code    Subjective:   Patient reports sob this morning otherwise denies acute complaints    Objective:     Vitals:   Temp (24hrs), Av.9 °F (36.6 °C), Min:97.4 °F (36.3 °C), Max:98.6 °F (37 °C)    Temp:  [97.4 °F (36.3 °C)-98.6 °F (37 °C)] 98.6 °F (37 °C)  HR:  [] 110  BP: ()/(58-75) 101/69  SpO2:  [100 %] 100 %  Body mass index is 19.37 kg/m².     Input and Output Summary (last 24 hours):     Intake/Output Summary (Last 24 hours) at 2024 1804  Last data filed at 2024 1600  Gross per 24 hour   Intake 940 ml   Output 1025 ml   Net -85 ml       Physical Exam:   Physical Exam  Vitals and nursing note reviewed.   Constitutional:       General: He is not in acute distress.     Appearance: He is well-developed. He is not toxic-appearing or diaphoretic.   HENT:      Head: Normocephalic and atraumatic.   Eyes:      General: No scleral icterus.     Conjunctiva/sclera: Conjunctivae normal.   Cardiovascular:      Rate and Rhythm: Normal rate and regular rhythm.      Heart sounds: No murmur heard.     No friction rub. No gallop.   Pulmonary:      Effort: Pulmonary effort is normal. No respiratory distress.      Breath sounds: No stridor. Wheezing and rhonchi present. No rales.   Chest:      Chest wall: No tenderness.   Abdominal:      General: There is no distension.      Palpations: Abdomen is soft. There is no mass.      Tenderness: There is no abdominal tenderness. There is no rebound.      Hernia: No hernia is present.   Musculoskeletal:         General: No swelling or tenderness.      Cervical back: Neck supple.   Skin:      General: Skin is warm and dry.      Capillary Refill: Capillary refill takes less than 2 seconds.   Neurological:      Mental Status: He is alert and oriented to person, place, and time.   Psychiatric:         Mood and Affect: Mood normal.          Additional Data:     Labs:  Results from last 7 days   Lab Units 01/30/24 0455 01/29/24 0449 01/28/24  0821   WBC Thousand/uL 5.63   < > 12.89*   HEMOGLOBIN g/dL 9.5*   < > 8.3*   HEMATOCRIT % 32.5*   < > 27.4*   PLATELETS Thousands/uL 220   < > 189   BANDS PCT %  --   --  1   NEUTROS PCT % 88*  --   --    LYMPHS PCT % 4*  --   --    LYMPHO PCT %  --   --  2*   MONOS PCT % 7  --   --    MONO PCT %  --   --  2*   EOS PCT % 0  --  0    < > = values in this interval not displayed.     Results from last 7 days   Lab Units 01/30/24 0455 01/29/24  0449   SODIUM mmol/L 139 135   POTASSIUM mmol/L 3.7 4.5   CHLORIDE mmol/L 87* 90*   CO2 mmol/L >45* 38*   BUN mg/dL 37* 34*   CREATININE mg/dL 0.98 0.77   ANION GAP mmol/L  --  7   CALCIUM mg/dL 9.1 9.3   ALBUMIN g/dL  --  3.7   TOTAL BILIRUBIN mg/dL  --  0.35   ALK PHOS U/L  --  61   ALT U/L  --  19   AST U/L  --  30   GLUCOSE RANDOM mg/dL 146* 151*                 Results from last 7 days   Lab Units 01/27/24  0647   PROCALCITONIN ng/ml 0.16       Lines/Drains:  Invasive Devices       Peripheral Intravenous Line  Duration             Peripheral IV 01/27/24 Left Antecubital 3 days    Peripheral IV 01/27/24 Right Antecubital 3 days              Drain  Duration             External Urinary Catheter Small 1 day                          Imaging: No pertinent imaging reviewed.    Recent Cultures (last 7 days):   Results from last 7 days   Lab Units 01/27/24  1919 01/27/24  1554   BLOOD CULTURE   --  No Growth at 48 hrs.  No Growth at 48 hrs.   LEGIONELLA URINARY ANTIGEN  Negative  --        Last 24 Hours Medication List:   Current Facility-Administered Medications   Medication Dose Route Frequency Provider Last Rate    budesonide  1  mg Nebulization Q12H Peg Junior MD      chlorhexidine  15 mL Mouth/Throat Q12H [facility] Aden Navarrete DO      cyanocobalamin  1,000 mcg Oral Daily Aden Navarrete DO      ergocalciferol  50,000 Units Oral Weekly Aden Navarrete DO      guaiFENesin  1,200 mg Oral Q12H [facility] Sameer Jackson DO      heparin (porcine)  5,000 Units Subcutaneous Q8H [facility] Olga Goodman DO      ipratropium  0.5 mg Nebulization 4x Daily Aden Navarrete DO      levalbuterol  1.25 mg Nebulization 4x Daily Sushant Hilliard DO      melatonin  6 mg Oral HS Aden Navarrete DO      methylPREDNISolone sodium succinate  40 mg Intravenous Q8H [facility] Sameer Jackson DO      pravastatin  80 mg Oral Daily With Dinner Aden Navarrete DO      senna  1 tablet Oral HS Olga Goodman DO      sodium chloride  1 spray Each Nare Q1H PRN Aden Navarrete DO      sodium chloride  4 mL Nebulization 4x Daily Sushant Hilliard DO      tamsulosin  0.4 mg Oral Daily With Dinner Peg Junior MD      torsemide  40 mg Oral Daily Olga Goodman DO          Today, Patient Was Seen By: Sushant Hilliard DO    **Please Note: This note may have been constructed using a voice recognition system.**

## 2024-01-30 NOTE — PHYSICAL THERAPY NOTE
Physical Therapy Cancellation Note       01/30/24 0930   PT Last Visit   PT Visit Date 01/30/24   Note Type   Note type Cancelled Session   Cancel Reasons Refusal       PT orders received and chart reviewed. Attempted to see pt at this time. Pt deferring therapy at this time, citing increased fatigue. PT explained the benefits of mobility and exercises. Pt continues to defer. Will continue to follow as appropriate.     Ron Grady PT, DPT

## 2024-01-30 NOTE — PROGRESS NOTES
Progress Note - Pulmonary   Natalio Hartmann Jr. 68 y.o. male MRN: [id number]  Unit/Bed#: ICCU 202-01 Encounter: [id number]    Assessment:  Acute on chronic hypoxic and hypercapnic respiratory failure   - 3 to 6L home oxygen  Very severe COPD  - FEV1 22% predicted; on chronic Prednisone  HFpEF  Pulmonary cachexia  CAD  Chronic hyponatremia  Hx of prostate CA, 2021  BPH  Chronic anemia  Type II DM  KEVIN  - nocturnal Trilogy  Nicotine dependence, cigarettes, in remission    Plan:  - Repeat chest x-ray to assess right mid lung opacity   - Continue AVAPS qHs and PRN   - Will increase methylpred to 40mg IV q8h today and consider decreasing pending improvement  - continue Pulmicort q12h, holding home formoterol   - continue Xopenex/Atrovent QID   - Continue airway clearance, will add flutter valve, guaifenesin, and 3% saline nebs for further mucus clearance       Subjective:   Patient seen and examined at bedside. Patient reports breathing is somewhat improved since admission. He is currently on HFNC this morning 60/60 saturating 100%. When discussing weaning his oxygen he asked for it to be left alone. He has continued cough with difficulty bringing up his secretions.     Objective:     Vitals: Blood pressure (!) 86/60, pulse 90, temperature (!) 97.4 °F (36.3 °C), temperature source Oral, resp. rate 19, weight 48 kg (105 lb 14.4 oz), SpO2 100%.,Body mass index is 19.37 kg/m².      Intake/Output Summary (Last 24 hours) at 1/30/2024 0826  Last data filed at 1/30/2024 0501  Gross per 24 hour   Intake 540 ml   Output 2750 ml   Net -2210 ml       Invasive Devices       Peripheral Intravenous Line  Duration             Peripheral IV 01/27/24 Left Antecubital 3 days    Peripheral IV 01/27/24 Right Antecubital 3 days              Drain  Duration             External Urinary Catheter Small <1 day                    Physical Exam:   General: No acute distress  HENT: Normocephalic, atraumatic.  PERRL, EOMI, Moist mucous membranes.  Neck:  Supple  Lungs: Distant breath sounds bilaterally, scattered rhonchi and wheezing.  On HFNC  Heart: Regular rate and rhythm, S1-S2 present, no murmurs rubs or gallops  Abdomen: Soft, nontender, nondistended  Extremities: Warm and well perfused, no cyanosis, clubbing, or edema  Skin: Warm, dry, no rashes or lesions  Neurologic: No focal neurologic deficits     Labs: CBC:   Lab Results   Component Value Date    WBC 5.63 01/30/2024    HGB 9.5 (L) 01/30/2024    HCT 32.5 (L) 01/30/2024     (H) 01/30/2024     01/30/2024    RBC 3.26 (L) 01/30/2024    MCH 29.1 01/30/2024    MCHC 29.2 (L) 01/30/2024    RDW 12.8 01/30/2024    MPV 9.8 01/30/2024    NRBC 0 01/30/2024   , CMP:   Lab Results   Component Value Date    SODIUM 139 01/30/2024    K 3.7 01/30/2024    CL 87 (L) 01/30/2024    CO2 >45 (HH) 01/30/2024    BUN 37 (H) 01/30/2024    CREATININE 0.98 01/30/2024    CALCIUM 9.1 01/30/2024    EGFR 80 01/30/2024     Imaging and other studies: I have personally reviewed pertinent films in PACS

## 2024-01-30 NOTE — ASSESSMENT & PLAN NOTE
Patient is chronically on home oxygen between 4 to 6 L/min  Presented with severe hypoxia and shortness of breath  Placed on BiPAP in ED  Chest x-ray with mild pulmonary edema. Focal opacity in the right midlung which could represent pneumonia.     Patient is afebrile with leukocytosis  Patient currently on high flow nasal cannula this morning  Patient is full code  Likely secondary to end-stage COPD exacerbation

## 2024-01-30 NOTE — OCCUPATIONAL THERAPY NOTE
"OT CANCEL NOTE:    Orders for OT evaluation received. Pt is currently refusing therapy evaluation due to difficulty breathing. Pt reports \"I can't do it now\". OT will attempt back later as time permits.   "

## 2024-01-30 NOTE — ASSESSMENT & PLAN NOTE
Wt Readings from Last 3 Encounters:   01/30/24 48 kg (105 lb 14.4 oz)   01/22/24 46.7 kg (103 lb)   01/10/24 43.5 kg (96 lb)     Previous cardiac echo on 5/1/2023 with EF 20%  Reviewing outpatient cardiology note this is secondary to dilated cardiomyopathy and left bundle branch block  Maintained on diuretics continue torsemide 40 mg daily

## 2024-01-30 NOTE — ASSESSMENT & PLAN NOTE
Patient with end-stage COPD on chronic hypoxic respiratory failure on home oxygen, chronically on prednisone prednisone 5 mg daily, budesonide/Perforomist twice daily, DuoNebs 4 times daily.   Follow-up with pulmonology as an outpatient  Presented with COPD exacerbation, CO2 retention and respiratory acidosis  Requiring high flow nasal cannula this morning

## 2024-01-31 ENCOUNTER — TELEPHONE (OUTPATIENT)
Dept: NEPHROLOGY | Facility: CLINIC | Age: 69
End: 2024-01-31

## 2024-01-31 LAB
ALBUMIN SERPL BCP-MCNC: 3.5 G/DL (ref 3.5–5)
ALP SERPL-CCNC: 60 U/L (ref 34–104)
ALT SERPL W P-5'-P-CCNC: 13 U/L (ref 7–52)
AST SERPL W P-5'-P-CCNC: 14 U/L (ref 13–39)
BASOPHILS # BLD AUTO: 0 THOUSANDS/ÂΜL (ref 0–0.1)
BASOPHILS NFR BLD AUTO: 0 % (ref 0–1)
BILIRUB SERPL-MCNC: 0.32 MG/DL (ref 0.2–1)
BUN SERPL-MCNC: 36 MG/DL (ref 5–25)
CALCIUM SERPL-MCNC: 8.8 MG/DL (ref 8.4–10.2)
CHLORIDE SERPL-SCNC: 85 MMOL/L (ref 96–108)
CO2 SERPL-SCNC: >45 MMOL/L (ref 21–32)
CREAT SERPL-MCNC: 0.85 MG/DL (ref 0.6–1.3)
EOSINOPHIL # BLD AUTO: 0 THOUSAND/ÂΜL (ref 0–0.61)
EOSINOPHIL NFR BLD AUTO: 0 % (ref 0–6)
ERYTHROCYTE [DISTWIDTH] IN BLOOD BY AUTOMATED COUNT: 12.6 % (ref 11.6–15.1)
GFR SERPL CREATININE-BSD FRML MDRD: 90 ML/MIN/1.73SQ M
GLUCOSE SERPL-MCNC: 142 MG/DL (ref 65–140)
GLUCOSE SERPL-MCNC: 174 MG/DL (ref 65–140)
GLUCOSE SERPL-MCNC: 216 MG/DL (ref 65–140)
GLUCOSE SERPL-MCNC: 218 MG/DL (ref 65–140)
GLUCOSE SERPL-MCNC: 267 MG/DL (ref 65–140)
GLUCOSE SERPL-MCNC: 345 MG/DL (ref 65–140)
HCT VFR BLD AUTO: 31.6 % (ref 36.5–49.3)
HGB BLD-MCNC: 9.7 G/DL (ref 12–17)
IMM GRANULOCYTES # BLD AUTO: 0.03 THOUSAND/UL (ref 0–0.2)
IMM GRANULOCYTES NFR BLD AUTO: 1 % (ref 0–2)
LYMPHOCYTES # BLD AUTO: 0.22 THOUSANDS/ÂΜL (ref 0.6–4.47)
LYMPHOCYTES NFR BLD AUTO: 5 % (ref 14–44)
MCH RBC QN AUTO: 29.3 PG (ref 26.8–34.3)
MCHC RBC AUTO-ENTMCNC: 30.7 G/DL (ref 31.4–37.4)
MCV RBC AUTO: 96 FL (ref 82–98)
MONOCYTES # BLD AUTO: 0.3 THOUSAND/ÂΜL (ref 0.17–1.22)
MONOCYTES NFR BLD AUTO: 7 % (ref 4–12)
NEUTROPHILS # BLD AUTO: 4.03 THOUSANDS/ÂΜL (ref 1.85–7.62)
NEUTS SEG NFR BLD AUTO: 87 % (ref 43–75)
NRBC BLD AUTO-RTO: 0 /100 WBCS
PLATELET # BLD AUTO: 228 THOUSANDS/UL (ref 149–390)
PMV BLD AUTO: 9.8 FL (ref 8.9–12.7)
POTASSIUM SERPL-SCNC: 3.8 MMOL/L (ref 3.5–5.3)
PROT SERPL-MCNC: 6 G/DL (ref 6.4–8.4)
RBC # BLD AUTO: 3.31 MILLION/UL (ref 3.88–5.62)
SODIUM SERPL-SCNC: 139 MMOL/L (ref 135–147)
WBC # BLD AUTO: 4.58 THOUSAND/UL (ref 4.31–10.16)

## 2024-01-31 PROCEDURE — 94660 CPAP INITIATION&MGMT: CPT

## 2024-01-31 PROCEDURE — 94664 DEMO&/EVAL PT USE INHALER: CPT

## 2024-01-31 PROCEDURE — 94760 N-INVAS EAR/PLS OXIMETRY 1: CPT

## 2024-01-31 PROCEDURE — 97167 OT EVAL HIGH COMPLEX 60 MIN: CPT

## 2024-01-31 PROCEDURE — 97163 PT EVAL HIGH COMPLEX 45 MIN: CPT

## 2024-01-31 PROCEDURE — 94640 AIRWAY INHALATION TREATMENT: CPT

## 2024-01-31 PROCEDURE — 80053 COMPREHEN METABOLIC PANEL: CPT | Performed by: INTERNAL MEDICINE

## 2024-01-31 PROCEDURE — 94668 MNPJ CHEST WALL SBSQ: CPT

## 2024-01-31 PROCEDURE — 82948 REAGENT STRIP/BLOOD GLUCOSE: CPT

## 2024-01-31 PROCEDURE — 99232 SBSQ HOSP IP/OBS MODERATE 35: CPT | Performed by: INTERNAL MEDICINE

## 2024-01-31 PROCEDURE — 85025 COMPLETE CBC W/AUTO DIFF WBC: CPT | Performed by: INTERNAL MEDICINE

## 2024-01-31 RX ORDER — POTASSIUM CHLORIDE 20 MEQ/1
20 TABLET, EXTENDED RELEASE ORAL DAILY
Status: DISCONTINUED | OUTPATIENT
Start: 2024-01-31 | End: 2024-02-04 | Stop reason: HOSPADM

## 2024-01-31 RX ORDER — INSULIN LISPRO 100 [IU]/ML
1-5 INJECTION, SOLUTION INTRAVENOUS; SUBCUTANEOUS
Status: DISCONTINUED | OUTPATIENT
Start: 2024-01-31 | End: 2024-02-04 | Stop reason: HOSPADM

## 2024-01-31 RX ORDER — INSULIN GLARGINE 100 [IU]/ML
10 INJECTION, SOLUTION SUBCUTANEOUS
Status: DISCONTINUED | OUTPATIENT
Start: 2024-01-31 | End: 2024-02-01

## 2024-01-31 RX ORDER — INSULIN LISPRO 100 [IU]/ML
3 INJECTION, SOLUTION INTRAVENOUS; SUBCUTANEOUS
Status: DISCONTINUED | OUTPATIENT
Start: 2024-01-31 | End: 2024-02-01

## 2024-01-31 RX ADMIN — INSULIN GLARGINE 10 UNITS: 100 INJECTION, SOLUTION SUBCUTANEOUS at 21:51

## 2024-01-31 RX ADMIN — HEPARIN SODIUM 5000 UNITS: 5000 INJECTION INTRAVENOUS; SUBCUTANEOUS at 21:51

## 2024-01-31 RX ADMIN — METHYLPREDNISOLONE SODIUM SUCCINATE 40 MG: 40 INJECTION, POWDER, FOR SOLUTION INTRAMUSCULAR; INTRAVENOUS at 06:07

## 2024-01-31 RX ADMIN — POTASSIUM CHLORIDE 20 MEQ: 1500 TABLET, EXTENDED RELEASE ORAL at 09:46

## 2024-01-31 RX ADMIN — TORSEMIDE 40 MG: 20 TABLET ORAL at 09:48

## 2024-01-31 RX ADMIN — INSULIN LISPRO 2 UNITS: 100 INJECTION, SOLUTION INTRAVENOUS; SUBCUTANEOUS at 15:17

## 2024-01-31 RX ADMIN — SODIUM CHLORIDE SOLN NEBU 3% 4 ML: 3 NEBU SOLN at 15:36

## 2024-01-31 RX ADMIN — LEVALBUTEROL HYDROCHLORIDE 1.25 MG: 1.25 SOLUTION RESPIRATORY (INHALATION) at 07:23

## 2024-01-31 RX ADMIN — BUDESONIDE 1 MG: 0.5 INHALANT RESPIRATORY (INHALATION) at 07:23

## 2024-01-31 RX ADMIN — MELATONIN 6 MG: at 21:51

## 2024-01-31 RX ADMIN — BUDESONIDE 1 MG: 0.5 INHALANT RESPIRATORY (INHALATION) at 20:46

## 2024-01-31 RX ADMIN — CHLORHEXIDINE GLUCONATE 15 ML: 1.2 SOLUTION ORAL at 21:51

## 2024-01-31 RX ADMIN — INSULIN LISPRO 3 UNITS: 100 INJECTION, SOLUTION INTRAVENOUS; SUBCUTANEOUS at 09:59

## 2024-01-31 RX ADMIN — METHYLPREDNISOLONE SODIUM SUCCINATE 40 MG: 40 INJECTION, POWDER, FOR SOLUTION INTRAMUSCULAR; INTRAVENOUS at 21:51

## 2024-01-31 RX ADMIN — LEVALBUTEROL HYDROCHLORIDE 1.25 MG: 1.25 SOLUTION RESPIRATORY (INHALATION) at 15:36

## 2024-01-31 RX ADMIN — INSULIN LISPRO 1 UNITS: 100 INJECTION, SOLUTION INTRAVENOUS; SUBCUTANEOUS at 17:14

## 2024-01-31 RX ADMIN — METHYLPREDNISOLONE SODIUM SUCCINATE 40 MG: 40 INJECTION, POWDER, FOR SOLUTION INTRAMUSCULAR; INTRAVENOUS at 15:15

## 2024-01-31 RX ADMIN — IPRATROPIUM BROMIDE 0.5 MG: 0.5 SOLUTION RESPIRATORY (INHALATION) at 15:36

## 2024-01-31 RX ADMIN — LEVALBUTEROL HYDROCHLORIDE 1.25 MG: 1.25 SOLUTION RESPIRATORY (INHALATION) at 20:46

## 2024-01-31 RX ADMIN — HEPARIN SODIUM 5000 UNITS: 5000 INJECTION INTRAVENOUS; SUBCUTANEOUS at 15:18

## 2024-01-31 RX ADMIN — LEVALBUTEROL HYDROCHLORIDE 1.25 MG: 1.25 SOLUTION RESPIRATORY (INHALATION) at 03:19

## 2024-01-31 RX ADMIN — IPRATROPIUM BROMIDE 0.5 MG: 0.5 SOLUTION RESPIRATORY (INHALATION) at 20:46

## 2024-01-31 RX ADMIN — INSULIN LISPRO 3 UNITS: 100 INJECTION, SOLUTION INTRAVENOUS; SUBCUTANEOUS at 19:13

## 2024-01-31 RX ADMIN — HEPARIN SODIUM 5000 UNITS: 5000 INJECTION INTRAVENOUS; SUBCUTANEOUS at 06:06

## 2024-01-31 RX ADMIN — SODIUM CHLORIDE SOLN NEBU 3% 4 ML: 3 NEBU SOLN at 07:26

## 2024-01-31 RX ADMIN — GUAIFENESIN 1200 MG: 600 TABLET, EXTENDED RELEASE ORAL at 04:30

## 2024-01-31 RX ADMIN — GUAIFENESIN 1200 MG: 600 TABLET, EXTENDED RELEASE ORAL at 17:13

## 2024-01-31 RX ADMIN — SENNOSIDES 8.6 MG: 8.6 TABLET, FILM COATED ORAL at 21:51

## 2024-01-31 RX ADMIN — CHLORHEXIDINE GLUCONATE 15 ML: 1.2 SOLUTION ORAL at 09:49

## 2024-01-31 RX ADMIN — CYANOCOBALAMIN TAB 500 MCG 1000 MCG: 500 TAB at 09:46

## 2024-01-31 RX ADMIN — PRAVASTATIN SODIUM 80 MG: 80 TABLET ORAL at 17:13

## 2024-01-31 RX ADMIN — TAMSULOSIN HYDROCHLORIDE 0.4 MG: 0.4 CAPSULE ORAL at 17:13

## 2024-01-31 RX ADMIN — IPRATROPIUM BROMIDE 0.5 MG: 0.5 SOLUTION RESPIRATORY (INHALATION) at 07:23

## 2024-01-31 RX ADMIN — SODIUM CHLORIDE SOLN NEBU 3% 4 ML: 3 NEBU SOLN at 20:46

## 2024-01-31 NOTE — ASSESSMENT & PLAN NOTE
Patient with end-stage COPD on chronic hypoxic respiratory failure on home oxygen, chronically on prednisone prednisone 5 mg daily, budesonide/Perforomist twice daily, DuoNebs 4 times daily.   Follow-up with pulmonology as an outpatient  Presented with COPD exacerbation, CO2 retention and respiratory acidosis  Continue prednisone taper

## 2024-01-31 NOTE — PROGRESS NOTES
Progress Note - Pulmonary   Natalio Hartmann Jr. 68 y.o. male MRN: [id number]  Unit/Bed#: [facility] 202-01 Encounter: [id number]    Assessment:  Acute on chronic hypoxic and hypercapnic respiratory failure   - 3 to 6L home oxygen  Very severe COPD  - FEV1 22% predicted; on chronic Prednisone  HFpEF  Pulmonary cachexia  CAD  Chronic hyponatremia  Hx of prostate CA, 2021  BPH  Chronic anemia  Type II DM  KEVIN  - nocturnal Trilogy  Nicotine dependence, cigarettes, in remission    Plan:  - Continue AVAPS qHs and PRN   - Continue methylpred to 40mg IV q8h, taper steroids to q12h tomorrow   - continue Pulmicort q12h, holding home formoterol   - continue Xopenex/Atrovent QID   - Continue airway clearance with flutter valve, guaifenesin, and 3% saline nebs for further mucus clearance   - Wean supplemental O2 to goal SpO2 >88%    Subjective:   Patient seen and examined at bedside. No acute events overnight. Wore BIPAP PRN for increased work of breathing. Repeat CXR with mild improvement of right mid lung opacity.     Objective:     Vitals: Blood pressure 102/76, pulse 98, temperature 97.8 °F (36.6 °C), temperature source Oral, resp. rate 21, weight 45.9 kg (101 lb 3.2 oz), SpO2 100%.,Body mass index is 18.51 kg/m².      Intake/Output Summary (Last 24 hours) at 1/31/2024 0717  Last data filed at 1/31/2024 0100  Gross per 24 hour   Intake 1412 ml   Output 2350 ml   Net -938 ml       Invasive Devices       Peripheral Intravenous Line  Duration             Peripheral IV 01/31/24 Proximal;Right;Ventral (anterior) Forearm <1 day              Drain  Duration             External Urinary Catheter Small 1 day                    Physical Exam:   General: No acute distress  HENT: Normocephalic, atraumatic.  PERRL, EOMI, Moist mucous membranes.  Neck: Supple  Lungs: Distant breath sounds bilaterally. Minimal expiratory wheeze.  On HFNC  Heart: Regular rate and rhythm, S1-S2 present, no murmurs rubs or gallops  Abdomen: Soft, nontender,  nondistended  Extremities: Warm and well perfused, no cyanosis, clubbing, or edema  Skin: Warm, dry, no rashes or lesions  Neurologic: No focal neurologic deficits     Labs: CBC:   Lab Results   Component Value Date    WBC 4.58 01/31/2024    HGB 9.7 (L) 01/31/2024    HCT 31.6 (L) 01/31/2024    MCV 96 01/31/2024     01/31/2024    RBC 3.31 (L) 01/31/2024    MCH 29.3 01/31/2024    MCHC 30.7 (L) 01/31/2024    RDW 12.6 01/31/2024    MPV 9.8 01/31/2024    NRBC 0 01/31/2024   , CMP:   Lab Results   Component Value Date    SODIUM 139 01/31/2024    K 3.8 01/31/2024    CL 85 (L) 01/31/2024    CO2 >45 (HH) 01/31/2024    BUN 36 (H) 01/31/2024    CREATININE 0.85 01/31/2024    CALCIUM 8.8 01/31/2024    AST 14 01/31/2024    ALT 13 01/31/2024    ALKPHOS 60 01/31/2024    EGFR 90 01/31/2024     Imaging and other studies: I have personally reviewed pertinent films in PACS

## 2024-01-31 NOTE — PLAN OF CARE
Problem: Potential for Falls  Goal: Patient will remain free of falls  Description: INTERVENTIONS:  - Educate patient/family on patient safety including physical limitations  - Instruct patient to call for assistance with activity   - Consult OT/PT to assist with strengthening/mobility   - Keep Call bell within reach  - Keep bed low and locked with side rails adjusted as appropriate  - Keep care items and personal belongings within reach  - Initiate and maintain comfort rounds  - Make Fall Risk Sign visible to staff  - Offer Toileting every 2 Hours, in advance of need  - Initiate/Maintain bed alarm  - Obtain necessary fall risk management equipment:   - Apply yellow socks and bracelet for high fall risk patients  - Consider moving patient to room near nurses station  Outcome: Progressing     Problem: PAIN - ADULT  Goal: Verbalizes/displays adequate comfort level or baseline comfort level  Description: Interventions:  - Encourage patient to monitor pain and request assistance  - Assess pain using appropriate pain scale  - Administer analgesics based on type and severity of pain and evaluate response  - Implement non-pharmacological measures as appropriate and evaluate response  - Consider cultural and social influences on pain and pain management  - Notify physician/advanced practitioner if interventions unsuccessful or patient reports new pain  Outcome: Progressing     Problem: INFECTION - ADULT  Goal: Absence or prevention of progression during hospitalization  Description: INTERVENTIONS:  - Assess and monitor for signs and symptoms of infection  - Monitor lab/diagnostic results  - Monitor all insertion sites, i.e. indwelling lines, tubes, and drains  - Monitor endotracheal if appropriate and nasal secretions for changes in amount and color  - Pruden appropriate cooling/warming therapies per order  - Administer medications as ordered  - Instruct and encourage patient and family to use good hand hygiene  technique  - Identify and instruct in appropriate isolation precautions for identified infection/condition  Outcome: Progressing    Problem: DISCHARGE PLANNING  Goal: Discharge to home or other facility with appropriate resources  Description: INTERVENTIONS:  - Identify barriers to discharge w/patient and caregiver  - Arrange for needed discharge resources and transportation as appropriate  - Identify discharge learning needs (meds, wound care, etc.)  - Arrange for interpretive services to assist at discharge as needed  - Refer to Case Management Department for coordinating discharge planning if the patient needs post-hospital services based on physician/advanced practitioner order or complex needs related to functional status, cognitive ability, or social support system  Outcome: Progressing

## 2024-01-31 NOTE — PLAN OF CARE
Problem: PHYSICAL THERAPY ADULT  Goal: Performs mobility at highest level of function for planned discharge setting.  See evaluation for individualized goals.  Description: Treatment/Interventions: Functional transfer training, LE strengthening/ROM, Therapeutic exercise, Elevations, Endurance training, Patient/family training, Equipment eval/education, Bed mobility, Gait training, Spoke to nursing, Spoke to case management, OT  Equipment Recommended: Walker (pt owns)       See flowsheet documentation for full assessment, interventions and recommendations.  Note: Prognosis: Fair  Problem List: Decreased strength, Decreased endurance, Impaired balance, Decreased mobility  Assessment: Pt is a 68 y.o. male seen for PT evaluation s/p admit to [facility]. Pt was admitted with a primary dx of: Acute on chronic respiratory failure with hypoxia and hypercapnia.  PT now consulted for assessment of mobility and d/c needs. Pt with Up and OOB as tolerated  orders.  Pts current comorbidities effecting treatment include: Pulmonary nodules, prostate CA, chronic HFrEF, COPD. Pts current clinical presentation is Unstable/ Unpredictable (high complexity) due to Ongoing medical management for primary dx, Decreased activity tolerance compared to baseline, Fall risk, Increased assistance needed from caregiver at current time, Ongoing telemetry monitoring, Increased O2 via NC from pts baseline, Continuous pulse oximetry monitoring . Prior to admission, pt was living in 1SH and was IND in mobility w/ RW. Upon evaluation, pt currently is requiring SUP for bed mobility; MIN A for transfers; MIN A for ambulation 10 ft w/ RW. Pt presents at PT eval functioning below baseline and currently w/ overall mobility deficits 2* to: BLE weakness, impaired balance, decreased endurance, gait deviations, decreased activity tolerance compared to baseline, decreased functional mobility tolerance compared to baseline, fall risk, SOB  upon exertion. Pt currently at a fall risk 2* to impairments listed above.  Pt will continue to benefit from skilled acute PT interventions to address stated impairments; to maximize functional mobility; for ongoing pt/ family training; and DME needs. At conclusion of PT session pt returned BTB, bed alarm engaged, and all needs in reach with phone and call bell within reach. Pt denies any further questions at this time. The patient's AM-PAC Basic Mobility Inpatient Short Form Raw Score is 19. A Raw score of greater than 16 suggests the patient may benefit from discharge to home. Please also refer to the recommendation of the Physical Therapist for safe discharge planning. Recommend home w/ HHPT upon hospital D/C.        Rehab Resource Intensity Level, PT: III (Minimum Resource Intensity)    See flowsheet documentation for full assessment.

## 2024-01-31 NOTE — ASSESSMENT & PLAN NOTE
Malnutrition Findings:                                 BMI Findings:           Body mass index is 18.51 kg/m².

## 2024-01-31 NOTE — PHYSICAL THERAPY NOTE
Physical Therapy Evaluation    Patient Name: Natalio Hartmann Jr.    Today's Date: 1/31/2024     Problem List  Principal Problem:    Acute on chronic respiratory failure with hypoxia and hypercapnia (HCC)  Active Problems:    Obstructive sleep apnea    Goals of care, counseling/discussion    BPH with obstruction/lower urinary tract symptoms    Prostate cancer (HCC)    Pulmonary nodules/lesions, multiple    Protein-calorie malnutrition (HCC)    Chronic HFrEF (heart failure with reduced ejection fraction)     Chronic anemia    Chronic hyponatremia    Physical deconditioning    Hyperlipidemia    End-stage COPD with acute exacerbation (HCC)    Chronic hypercapnia/KEVIN    Pulmonary cachexia due to COPD (HCC)    Severe protein-calorie malnutrition (HCC)       Past Medical History  Past Medical History:   Diagnosis Date    Acute metabolic encephalopathy 03/29/2022    Arthritis     Bladder mass     Cardiomyopathy (HCC)     Chest pain     COPD (chronic obstructive pulmonary disease) (HCC)     COVID-19 virus infection 02/23/2023    CPAP (continuous positive airway pressure) dependence     Emphysema of lung (HCC)     Hypoxia     nocturnal    Left bundle branch block     Multiple pulmonary nodules     last assessed: 10/12/16    Pneumonia     Sleep apnea, obstructive     Smoker     Weight loss 08/29/2019        Past Surgical History  Past Surgical History:   Procedure Laterality Date    COLONOSCOPY      CYSTOSCOPY      CYSTOSCOPY  02/17/2021    IL BLADDER INSTILLATION ANTICARCINOGENIC AGENT N/A 1/3/2020    Procedure: INSTILLATION MITOMYCIN;  Surgeon: Sundeep Canchola MD;  [facility]: [facility];  Service: Urology    IL CYSTO W/REMOVAL OF LESIONS SMALL N/A 1/3/2020    Procedure: TRANSURETHRAL RESECTION OF BLADDER TUMOR (TURBT);  Surgeon: Sundeep Canchola MD;  [facility];  Service: Urology    IL CYSTOURETHROSCOPY WITH BIOPSY N/A 4/13/2021    Procedure: CYSTOSCOPY WITH  BIOPSIES;  Surgeon: Sundeep Canchola MD;  [facility];  Service: Urology    NH TRURL ELECTROSURG RESCJ PROSTATE BLEED COMPLETE N/A 4/13/2021    Procedure: TRANSURETHRAL RESECTION OF PROSTATE (TURP) BLADDER BIOPSY, FULGURATION;  Surgeon: Sundeep Canchola MD;  [facility];  Service: Urology        01/31/24 1044   PT Last Visit   PT Visit Date 01/31/24   Note Type   Note type Evaluation   Pain Assessment   Pain Assessment Tool 0-10   Pain Score No Pain   Restrictions/Precautions   Weight Bearing Precautions Per Order No   Other Precautions O2;Fall Risk;Pain;Chair Alarm;Bed Alarm  (13L O2)   Home Living   Type of Home House   Home Layout One level;Stairs to enter with rails;Performs ADLs on one level;Able to live on main level with bedroom/bathroom  (2+1 CARLO)   Bathroom Shower/Tub Tub/shower unit   Bathroom Toilet Raised   Bathroom Equipment Grab bars in shower;Tub transfer bench;Grab bars around toilet   Bathroom Accessibility Accessible   Home Equipment Walker;Wheelchair-manual   Additional Comments Pt reports living in 1SH 2+1 CARLO and was using RW for short distances, WC for community   Prior Function   Level of Cotton Independent with functional mobility;Needs assistance with ADLs;Needs assistance with IADLS   Lives With Son;Spouse   Receives Help From Family   IADLs Family/Friend/Other provides transportation;Family/Friend/Other provides meals   Falls in the last 6 months 0   Vocational Retired   General   Family/Caregiver Present Yes  (wife + son)   Cognition   Overall Cognitive Status WFL   Arousal/Participation Alert   Orientation Level Oriented X4   Memory Decreased recall of precautions   Following Commands Follows one step commands with increased time or repetition   Comments Pt pleasant and cooperative t/o PT session   RLE Assessment   RLE Assessment WFL  (Grossly 3/5)   LLE Assessment   LLE Assessment   (Grossly 3/5)   Bed Mobility   Supine to Sit 5  Supervision   Additional items  HOB elevated;Increased time required;Verbal cues   Sit to Supine 5  Supervision   Additional items HOB elevated;Increased time required;Verbal cues   Additional Comments Upon arrival, pt supine in bed. Pt returned to bed w/ call bell and all needs following PT session   Transfers   Sit to Stand 4  Minimal assistance   Additional items Assist x 1;Increased time required   Stand to Sit 4  Minimal assistance   Additional items Assist x 1;Increased time required   Additional Comments transfers w/ RW   Ambulation/Elevation   Gait pattern Forward Flexion;Decreased foot clearance;Shuffling;Short stride;Excessively slow   Gait Assistance 4  Minimal assist   Additional items Assist x 1;Verbal cues   Assistive Device Rolling walker   Distance 10   Ambulation/Elevation Additional Comments SPO2 briefly dropped during ambulation trial, deferred further ambulation. Pt reports 8/10 difficulty on SHARIF scale   Balance   Static Sitting Fair +   Dynamic Sitting Fair   Static Standing Fair -   Dynamic Standing Poor +   Ambulatory Poor +   Endurance Deficit   Endurance Deficit Yes   Endurance Deficit Description Low SPO2, dec activity tolerance, deconditioning   Activity Tolerance   Activity Tolerance Patient limited by fatigue   Medical Staff Made Aware OT Lora- co-evaluation 2/2 medical complexity and co-morbidities   Nurse Made Aware RN cleared   Assessment   Prognosis Fair   Problem List Decreased strength;Decreased endurance;Impaired balance;Decreased mobility   Assessment Pt is a 68 y.o. male seen for PT evaluation s/p admit to [facility] on 1/27/2024. Pt was admitted with a primary dx of: Acute on chronic respiratory failure with hypoxia and hypercapnia.  PT now consulted for assessment of mobility and d/c needs. Pt with Up and OOB as tolerated  orders.  Pts current comorbidities effecting treatment include: Pulmonary nodules, prostate CA, chronic HFrEF, COPD. Pts current clinical presentation is Unstable/ Unpredictable  (high complexity) due to Ongoing medical management for primary dx, Decreased activity tolerance compared to baseline, Fall risk, Increased assistance needed from caregiver at current time, Ongoing telemetry monitoring, Increased O2 via NC from pts baseline, Continuous pulse oximetry monitoring . Prior to admission, pt was living in 1SH and was IND in mobility w/ RW. Upon evaluation, pt currently is requiring SUP for bed mobility; MIN A for transfers; MIN A for ambulation 10 ft w/ RW. Pt presents at PT eval functioning below baseline and currently w/ overall mobility deficits 2* to: BLE weakness, impaired balance, decreased endurance, gait deviations, decreased activity tolerance compared to baseline, decreased functional mobility tolerance compared to baseline, fall risk, SOB upon exertion. Pt currently at a fall risk 2* to impairments listed above.  Pt will continue to benefit from skilled acute PT interventions to address stated impairments; to maximize functional mobility; for ongoing pt/ family training; and DME needs. At conclusion of PT session pt returned BTB, bed alarm engaged, and all needs in reach with phone and call bell within reach. Pt denies any further questions at this time. The patient's AM-PAC Basic Mobility Inpatient Short Form Raw Score is 19. A Raw score of greater than 16 suggests the patient may benefit from discharge to home. Please also refer to the recommendation of the Physical Therapist for safe discharge planning. Recommend home w/ HHPT upon hospital D/C.   Goals   Patient Goals to go home   STG Expiration Date 02/14/24   Short Term Goal #1 STG 1. Pt will be able to perform bed mobility tasks with mod I in order to improve overall functional mobility and assist in safe d/c. STG 3. Pt will be able to perform functional transfer with mod I in order to improve overall functional mobility and assist in safe d/c. STG 4. Pt will be able to ambulate at least 100 feet with least restrictive  device with mod I A in order to improve overall functional mobility and assist in safe d/c. STG 5. Pt will improve sitting/standing static/dynamic balance 1/2 grade in order to improve functional mobility and assist in safe d/c. STG 6. Pt will improve LE strength by 1/2 grade in order to improve functional mobility and assist in safe d/c. STG 7. Pt will be able to negotiate at least 3 stairs with least restrictive device with mod I A in order to improve overall functional mobility and assist in safe d/c.   PT Treatment Day 0   Plan   Treatment/Interventions Functional transfer training;LE strengthening/ROM;Therapeutic exercise;Elevations;Endurance training;Patient/family training;Equipment eval/education;Bed mobility;Gait training;Spoke to nursing;Spoke to case management;OT   PT Frequency 2-3x/wk   Discharge Recommendation   Rehab Resource Intensity Level, PT III (Minimum Resource Intensity)   Equipment Recommended Walker  (pt owns)   AM-PAC Basic Mobility Inpatient   Turning in Flat Bed Without Bedrails 4   Lying on Back to Sitting on Edge of Flat Bed Without Bedrails 4   Moving Bed to Chair 3   Standing Up From Chair Using Arms 3   Walk in Room 3   Climb 3-5 Stairs With Railing 2   Basic Mobility Inpatient Raw Score 19   Basic Mobility Standardized Score 42.48   Highest Level Of Mobility   JH-HLM Goal 6: Walk 10 steps or more   JH-HLM Achieved 6: Walk 10 steps or more   Modified [place]io Scale   Modified [place] Scale 3   End of Consult   Patient Position at End of Consult Supine;Bed/Chair alarm activated;All needs within reach     Anabela Orozco PT, DPT

## 2024-01-31 NOTE — ASSESSMENT & PLAN NOTE
Malnutrition Findings:                        Nutritional and lifestyle changes discussed         BMI Findings:           Body mass index is 18.51 kg/m².

## 2024-01-31 NOTE — ASSESSMENT & PLAN NOTE
Patient is chronically on home oxygen between 4 to 6 L/min  Presented with severe hypoxia and shortness of breath  Placed on BiPAP in ED  Chest x-ray with mild pulmonary edema. Focal opacity in the right midlung which could represent pneumonia.     Patient is afebrile with leukocytosis  Patient currently on high flow nasal cannula this morning, weaned down to midlfow today  Continue to wean down oxygen as tolerated  Patient is full code  Likely secondary to end-stage COPD exacerbation

## 2024-01-31 NOTE — TELEPHONE ENCOUNTER
Left message for patient, that we need to reschedule their appointment with Dr. Reyes on 2/23/2024. This is the 1st attempt.

## 2024-01-31 NOTE — PLAN OF CARE
Problem: Potential for Falls  Goal: Patient will remain free of falls  Description: INTERVENTIONS:  - Educate patient/family on patient safety including physical limitations  - Instruct patient to call for assistance with activity   - Consult OT/PT to assist with strengthening/mobility   - Keep Call bell within reach  - Keep bed low and locked with side rails adjusted as appropriate  - Keep care items and personal belongings within reach  - Initiate and maintain comfort rounds  - Make Fall Risk Sign visible to staff  - Offer Toileting every  Hours, in advance of need  - Initiate/Maintain alarm  - Obtain necessary fall risk management equipment:   - Apply yellow socks and bracelet for high fall risk patients  - Consider moving patient to room near nurses station  Outcome: Progressing     Problem: PAIN - ADULT  Goal: Verbalizes/displays adequate comfort level or baseline comfort level  Description: Interventions:  - Encourage patient to monitor pain and request assistance  - Assess pain using appropriate pain scale  - Administer analgesics based on type and severity of pain and evaluate response  - Implement non-pharmacological measures as appropriate and evaluate response  - Consider cultural and social influences on pain and pain management  - Notify physician/advanced practitioner if interventions unsuccessful or patient reports new pain  Outcome: Progressing     Problem: SAFETY ADULT  Goal: Patient will remain free of falls  Description: INTERVENTIONS:  - Educate patient/family on patient safety including physical limitations  - Instruct patient to call for assistance with activity   - Consult OT/PT to assist with strengthening/mobility   - Keep Call bell within reach  - Keep bed low and locked with side rails adjusted as appropriate  - Keep care items and personal belongings within reach  - Initiate and maintain comfort rounds  - Make Fall Risk Sign visible to staff  - Offer Toileting every  Hours, in advance  of need  - Initiate/Maintain alarm  - Obtain necessary fall risk management equipment:   - Apply yellow socks and bracelet for high fall risk patients  - Consider moving patient to room near nurses station  Outcome: Progressing     Problem: RESPIRATORY - ADULT  Goal: Achieves optimal ventilation and oxygenation  Description: INTERVENTIONS:  - Assess for changes in respiratory status  - Assess for changes in mentation and behavior  - Position to facilitate oxygenation and minimize respiratory effort  - Oxygen administered by appropriate delivery if ordered  - Initiate smoking cessation education as indicated  - Encourage broncho-pulmonary hygiene including cough, deep breathe, Incentive Spirometry  - Assess the need for suctioning and aspirate as needed  - Assess and instruct to report SOB or any respiratory difficulty  - Respiratory Therapy support as indicated  Outcome: Progressing

## 2024-01-31 NOTE — ASSESSMENT & PLAN NOTE
Wt Readings from Last 3 Encounters:   01/31/24 45.9 kg (101 lb 3.2 oz)   01/22/24 46.7 kg (103 lb)   01/10/24 43.5 kg (96 lb)     Previous cardiac echo on 5/1/2023 with EF 20%  Reviewing outpatient cardiology note this is secondary to dilated cardiomyopathy and left bundle branch block  Maintained on diuretics continue torsemide 40 mg daily

## 2024-01-31 NOTE — PLAN OF CARE
Problem: OCCUPATIONAL THERAPY ADULT  Goal: Performs self-care activities at highest level of function for planned discharge setting.  See evaluation for individualized goals.  Note: Limitation: Decreased ADL status, Decreased endurance, Decreased self-care trans, Decreased high-level ADLs     Assessment: Pt is a 68 y.o. male seen for OT evaluation s/p admit to [facility] on 1/27/2024 w/ Acute on chronic respiratory failure with hypoxia and hypercapnia (HCC).  Pt is in the ICCU, requires 13L O2. Multiple lines.  has a past medical history of Acute metabolic encephalopathy (03/29/2022), Arthritis, Bladder mass, Cardiomyopathy ([facility]), Chest pain, COPD (chronic obstructive pulmonary disease) ([facility]), COVID-19 virus infection (02/23/2023), CPAP (continuous positive airway pressure) dependence, Emphysema of lung ([facility]), Hypoxia, Left bundle branch block, Multiple pulmonary nodules, Pneumonia, Sleep apnea, obstructive, Smoker, and Weight loss (08/29/2019).  Personal factors affecting pt at time of IE include:steps to enter environment, difficulty performing ADLS, and difficulty performing IADLS . Prior to admission, pt was living w family in a one story home, fairly independent w self care and mobility. Upon evaluation: Pt requires min assist and increased time for completion of self care, mobility 2* the following deficits impacting occupational performance: weakness, decreased strength, decreased balance, and decreased tolerance. Pt to benefit from continued skilled OT tx while in the hospital to address deficits as defined above and maximize level of functional independence w ADL's and functional mobility. Occupational Performance areas to address include: grooming, bathing/shower, toilet hygiene, dressing, functional mobility, and clothing management.  Based on findings from OT evaluation and functional performance deficits, pt has been identified as a  high complexity evaluation. The patient's raw score on the  AM-PAC Daily Activity inpatient short form is 19, standardized score is 40.22, greater than 39.4. Patients at this level are likely to benefit from discharge to home. From OT standpoint, recommendation at time of d/c would be home w family support.     Rehab Resource Intensity Level, OT: III (Minimum Resource Intensity)

## 2024-01-31 NOTE — OCCUPATIONAL THERAPY NOTE
Occupational Therapy Evaluation      Natalio Hartmann     1/31/2024    Principal Problem:    Acute on chronic respiratory failure with hypoxia and hypercapnia (HCC)  Active Problems:    Obstructive sleep apnea    Goals of care, counseling/discussion    BPH with obstruction/lower urinary tract symptoms    Prostate cancer (HCC)    Pulmonary nodules/lesions, multiple    Protein-calorie malnutrition (HCC)    Chronic HFrEF (heart failure with reduced ejection fraction)     Chronic anemia    Chronic hyponatremia    Physical deconditioning    Hyperlipidemia    End-stage COPD with acute exacerbation (HCC)    Chronic hypercapnia/KEVIN    Pulmonary cachexia due to COPD (HCC)    Severe protein-calorie malnutrition (HCC)      Past Medical History:   Diagnosis Date    Acute metabolic encephalopathy 03/29/2022    Arthritis     Bladder mass     Cardiomyopathy (HCC)     Chest pain     COPD (chronic obstructive pulmonary disease) (HCC)     COVID-19 virus infection 02/23/2023    CPAP (continuous positive airway pressure) dependence     Emphysema of lung (HCC)     Hypoxia     nocturnal    Left bundle branch block     Multiple pulmonary nodules     last assessed: 10/12/16    Pneumonia     Sleep apnea, obstructive     Smoker     Weight loss 08/29/2019       Past Surgical History:   Procedure Laterality Date    COLONOSCOPY      CYSTOSCOPY      CYSTOSCOPY  02/17/2021    AK BLADDER INSTILLATION ANTICARCINOGENIC AGENT N/A 1/3/2020    Procedure: INSTILLATION MITOMYCIN;  Surgeon: Sundeep Canchola MD;  [facility];  Service: Urology    AK CYSTO W/REMOVAL OF LESIONS SMALL N/A 1/3/2020    Procedure: TRANSURETHRAL RESECTION OF BLADDER TUMOR (TURBT);  Surgeon: Sundeep Canchola MD;  [facility];  Service: Urology    AK CYSTOURETHROSCOPY WITH BIOPSY N/A 4/13/2021    Procedure: CYSTOSCOPY WITH BIOPSIES;  Surgeon: Sundeep Canchola MD;  [facility];  Service: Urology    AK TRURL ELECTROSURG RESCJ PROSTATE BLEED COMPLETE N/A  "4/13/2021    Procedure: TRANSURETHRAL RESECTION OF PROSTATE (TURP) BLADDER BIOPSY, FULGURATION;  Surgeon: Sundeep Canchola MD;  [facility];  Service: Urology        01/31/24 1104   OT Last Visit   OT Visit Date 01/31/24   Note Type   Note type Evaluation   Pain Assessment   Pain Assessment Tool 0-10   Pain Score No Pain   Restrictions/Precautions   Weight Bearing Precautions Per Order No   Other Precautions O2;Fall Risk  (13 L O2)   Home Living   Type of Home House   Home Layout One level;Stairs to enter with rails  (2+1 CARLO)   Bathroom Shower/Tub Tub/shower unit   Bathroom Toilet Raised   Bathroom Equipment Grab bars in shower;Tub transfer bench   Home Equipment Walker;Wheelchair-manual  (home O2)   Additional Comments Lives in one story home w wife and 17 y/o son   Prior Function   Level of [place] Independent with functional mobility;Needs assistance with IADLS;Needs assistance with ADLs   Lives With Son;Spouse   Receives Help From Family   IADLs Family/Friend/Other provides transportation;Family/Friend/Other provides meals   Falls in the last 6 months 0   Vocational Retired   Lifestyle   Autonomy pt reports being mostly indpeendent w self care, showers and toileting.needs assist w LB when respiratory status is 'bad\"   Reciprocal Relationships supportive family. wife works 5-6 hrs per day   Intrinsic Gratification likes fishing, family   General   Additional Pertinent History pt is typically home alone when wife is at work. able to walk inside his home. not very active due to O2 needs   Family/Caregiver Present Yes   Additional General Comments wife and son present for session   Subjective   Subjective \"It feels good sitting up for a bit\"   ADL   Eating Assistance 7  Independent   Eating Deficit Setup   Grooming Assistance 5  Supervision/Setup   Grooming Deficit Setup;Increased time to complete;Wash/dry hands;Wash/dry face   UB Bathing Assistance 4  Minimal Assistance   UB Bathing Deficit " Increased time to complete   LB Bathing Assistance 4  Minimal Assistance   LB Bathing Deficit Increased time to complete   UB Dressing Assistance 4  Minimal Assistance   UB Dressing Deficit Increased time to complete   LB Dressing Assistance 3  Moderate Assistance   LB Dressing Deficit Don/doff R sock;Don/doff L sock   Bed Mobility   Supine to Sit 5  Supervision   Additional items HOB elevated;Increased time required   Sit to Supine 5  Supervision   Additional items Increased time required;Verbal cues   Transfers   Sit to Stand 4  Minimal assistance   Additional items Assist x 1;Increased time required   Stand to Sit 4  Minimal assistance   Additional items Assist x 1;Increased time required   Stand pivot 4  Minimal assistance   Additional items Assist x 1   Additional Comments use of RW   Functional Mobility   Functional Mobility 4  Minimal assistance   Additional items Rolling walker   Balance   Static Sitting Fair +   Dynamic Sitting Fair   Static Standing Fair -   Dynamic Standing Poor +   Activity Tolerance   Activity Tolerance Patient limited by fatigue   Medical Staff Made Aware seen for co-eval with PT Anabela due to medical complexities   Nurse Made Aware ok to see   RUE Assessment   RUE Assessment WFL   LUE Assessment   LUE Assessment WFL   Hand Function   Gross Motor Coordination Functional   Fine Motor Coordination Functional   Psychosocial   Psychosocial (WDL) X   Patient Behaviors/Mood Anxious;Cooperative   Cognition   Overall Cognitive Status WFL   Arousal/Participation Alert;Cooperative   Attention Within functional limits   Orientation Level Oriented X4   Memory Decreased recall of precautions   Following Commands Follows one step commands without difficulty   Assessment   Limitation Decreased ADL status;Decreased endurance;Decreased self-care trans;Decreased high-level ADLs   Assessment Pt is a 68 y.o. male seen for OT evaluation s/p admit to [facility] on 1/27/2024 w/ Acute on chronic  respiratory failure with hypoxia and hypercapnia (HCC).  Pt is in the ICCU, requires 13L O2. Multiple lines.  has a past medical history of Acute metabolic encephalopathy (03/29/2022), Arthritis, Bladder mass, Cardiomyopathy (HCC), Chest pain, COPD (chronic obstructive pulmonary disease) (HCC), COVID-19 virus infection (02/23/2023), CPAP (continuous positive airway pressure) dependence, Emphysema of lung (HCC), Hypoxia, Left bundle branch block, Multiple pulmonary nodules, Pneumonia, Sleep apnea, obstructive, Smoker, and Weight loss (08/29/2019).  Personal factors affecting pt at time of IE include:steps to enter environment, difficulty performing ADLS, and difficulty performing IADLS . Prior to admission, pt was living w family in a one story home, fairly independent w self care and mobility. Upon evaluation: Pt requires min assist and increased time for completion of self care, mobility 2* the following deficits impacting occupational performance: weakness, decreased strength, decreased balance, and decreased tolerance. Pt to benefit from continued skilled OT tx while in the hospital to address deficits as defined above and maximize level of functional independence w ADL's and functional mobility. Occupational Performance areas to address include: grooming, bathing/shower, toilet hygiene, dressing, functional mobility, and clothing management.  Based on findings from OT evaluation and functional performance deficits, pt has been identified as a  high complexity evaluation. The patient's raw score on the AM-PAC Daily Activity inpatient short form is 19, standardized score is 40.22, greater than 39.4. Patients at this level are likely to benefit from discharge to home. From OT standpoint, recommendation at time of d/c would be home w family support.   Goals   Patient Goals to go home   Plan   Treatment Interventions ADL retraining;Functional transfer training;Endurance training;Patient/family training;Compensatory  technique education;Energy conservation;Activityengagement   Goal Expiration Date 02/14/24   OT Frequency 2-3x/wk   Discharge Recommendation   Rehab Resource Intensity Level, OT III (Minimum Resource Intensity)   AM-PAC Daily Activity Inpatient   Lower Body Dressing 3   Bathing 3   Toileting 3   Upper Body Dressing 3   Grooming 3   Eating 4   Daily Activity Raw Score 19   Daily Activity Standardized Score (Calc for Raw Score >=11) 40.22     OT GOALS TO BE ACHIEVED IN 14 DAYS:    Patient will complete bed mobility mod I  With good safety     Pt will demonstrate good balance sitting at EOB x 10 min for increased safety w self care and in preparation for increased indpendence    Pt will complete grooming independently with set up     Pt will complete UB bathing and dressing independently    Pt will complete LB bathing and dressing with supervision    Pt will complete toileting w mod I and good safety     Pt will complete functional transfers with S/use of DME as needed demonstrating good safety     Pt will tolerate standing at sinkside x 5 min w F+ balance for increased safety w hygiene    Pt will complete functional mobility in room and bathroom w S and use of most appropriate DME    Pt will demonstrate good ECT/self pacing skills with all self care and functional mobility

## 2024-01-31 NOTE — PROGRESS NOTES
[facility]  Progress Note  Name: Natalio Richardson I  MRN: [id number]  Unit/Bed#: ICCU 202-01 I Date of Admission: 1/27/2024   Date of Service: 1/31/2024 I Hospital Day: 4    Assessment/Plan   * Acute on chronic respiratory failure with hypoxia and hypercapnia (HCC)  Assessment & Plan  Patient is chronically on home oxygen between 4 to 6 L/min  Presented with severe hypoxia and shortness of breath  Placed on BiPAP in ED  Chest x-ray with mild pulmonary edema. Focal opacity in the right midlung which could represent pneumonia.     Patient is afebrile with leukocytosis  Patient currently on high flow nasal cannula this morning, weaned down to midlfow today  Continue to wean down oxygen as tolerated  Patient is full code  Likely secondary to end-stage COPD exacerbation     Severe protein-calorie malnutrition (HCC)  Assessment & Plan  Malnutrition Findings:                                 BMI Findings:           Body mass index is 18.51 kg/m².       Pulmonary cachexia due to COPD (HCC)  Assessment & Plan  Malnutrition Findings:                                 BMI Findings:           Body mass index is 18.51 kg/m².       Chronic hypercapnia/KEVIN  Assessment & Plan  Patient on home BiPAP at bedtime    End-stage COPD with acute exacerbation (HCC)  Assessment & Plan  Patient with end-stage COPD on chronic hypoxic respiratory failure on home oxygen, chronically on prednisone prednisone 5 mg daily, budesonide/Perforomist twice daily, DuoNebs 4 times daily.   Follow-up with pulmonology as an outpatient  Presented with COPD exacerbation, CO2 retention and respiratory acidosis  Continue prednisone taper    Hyperlipidemia  Assessment & Plan  Continue pravastatin 80 mg daily    Physical deconditioning  Assessment & Plan  Continue pt ot    Chronic hyponatremia  Assessment & Plan  Continue to monitor off salt tabs    Chronic anemia  Assessment & Plan  Patient with macrocytic anemia,  continue on B12 supplementation    Chronic HFrEF (heart failure with reduced ejection fraction)   Assessment & Plan  Wt Readings from Last 3 Encounters:   01/31/24 45.9 kg (101 lb 3.2 oz)   01/22/24 46.7 kg (103 lb)   01/10/24 43.5 kg (96 lb)     Previous cardiac echo on 5/1/2023 with EF 20%  Reviewing outpatient cardiology note this is secondary to dilated cardiomyopathy and left bundle branch block  Maintained on diuretics continue torsemide 40 mg daily          Protein-calorie malnutrition (HCC)  Assessment & Plan  Malnutrition Findings:                        Nutritional and lifestyle changes discussed         BMI Findings:           Body mass index is 18.51 kg/m².       Pulmonary nodules/lesions, multiple  Assessment & Plan  Being followed by pulmonary as an outpatient    Prostate cancer (HCC)  Assessment & Plan  Status post TURP    BPH with obstruction/lower urinary tract symptoms  Assessment & Plan  Continue on tamsulosin, patient with history of BPH status post TURP, monitor blood pressure    Goals of care, counseling/discussion  Assessment & Plan  Patient is a level 1 full code    Obstructive sleep apnea  Assessment & Plan  Patient with history of KEVIN, uses trilogy vent at bedtime with supplemental oxygen of 4 L               VTE Pharmacologic Prophylaxis:   Moderate Risk (Score 3-4) - Pharmacological DVT Prophylaxis Ordered: heparin.    Mobility:   Basic Mobility Inpatient Raw Score: 19  JH-HLM Goal: 6: Walk 10 steps or more  JH-HLM Achieved: 6: Walk 10 steps or more  HLM Goal achieved. Continue to encourage appropriate mobility.    Patient Centered Rounds: I performed bedside rounds with nursing staff today.   Discussions with Specialists or Other Care Team Provider: nurse, CM    Education and Discussions with Family / Patient: Patient declined call to .     Total Time Spent on Date of Encounter in care of patient: 30 mins. This time was spent on one or more of the following: performing  physical exam; counseling and coordination of care; obtaining or reviewing history; documenting in the medical record; reviewing/ordering tests, medications or procedures; communicating with other healthcare professionals and discussing with patient's family/caregivers.    Current Length of Stay: 4 day(s)  Current Patient Status: Inpatient   Certification Statement: The patient will continue to require additional inpatient hospital stay due to wean down oxygen  Discharge Plan: Anticipate discharge in 48-72 hrs to home with home services.    Code Status: Level 1 - Full Code    Subjective:   Denies any new complaints. Tolerating diet, denies pain, constipation or diarrhea    Objective:     Vitals:   Temp (24hrs), Av.9 °F (36.6 °C), Min:97.7 °F (36.5 °C), Max:98.3 °F (36.8 °C)    Temp:  [97.7 °F (36.5 °C)-98.3 °F (36.8 °C)] 97.9 °F (36.6 °C)  HR:  [] 104  Resp:  [20-21] 21  BP: ()/(65-80) 104/65  SpO2:  [97 %-100 %] 100 %  Body mass index is 18.51 kg/m².     Input and Output Summary (last 24 hours):     Intake/Output Summary (Last 24 hours) at 2024 1829  Last data filed at 2024 1810  Gross per 24 hour   Intake 1010 ml   Output 3600 ml   Net -2590 ml       Physical Exam:   Physical Exam  Constitutional:       Comments: cachectic   HENT:      Head: Normocephalic and atraumatic.      Nose: Nose normal.   Eyes:      Extraocular Movements: Extraocular movements intact.   Cardiovascular:      Rate and Rhythm: Normal rate and regular rhythm.   Pulmonary:      Effort: Pulmonary effort is normal.      Breath sounds: No wheezing or rales.   Skin:     General: Skin is warm and dry.   Neurological:      Mental Status: He is alert and oriented to person, place, and time.   Psychiatric:         Mood and Affect: Mood normal.         Behavior: Behavior normal.          Additional Data:     Labs:  Results from last 7 days   Lab Units 24  0440 24  0449 24  0821   WBC Thousand/uL 4.58   < >  12.89*   HEMOGLOBIN g/dL 9.7*   < > 8.3*   HEMATOCRIT % 31.6*   < > 27.4*   PLATELETS Thousands/uL 228   < > 189   BANDS PCT %  --   --  1   NEUTROS PCT % 87*   < >  --    LYMPHS PCT % 5*   < >  --    LYMPHO PCT %  --   --  2*   MONOS PCT % 7   < >  --    MONO PCT %  --   --  2*   EOS PCT % 0   < > 0    < > = values in this interval not displayed.     Results from last 7 days   Lab Units 01/31/24  0440 01/30/24  0455 01/29/24  0449   SODIUM mmol/L 139   < > 135   POTASSIUM mmol/L 3.8   < > 4.5   CHLORIDE mmol/L 85*   < > 90*   CO2 mmol/L >45*   < > 38*   BUN mg/dL 36*   < > 34*   CREATININE mg/dL 0.85   < > 0.77   ANION GAP mmol/L  --   --  7   CALCIUM mg/dL 8.8   < > 9.3   ALBUMIN g/dL 3.5  --  3.7   TOTAL BILIRUBIN mg/dL 0.32  --  0.35   ALK PHOS U/L 60  --  61   ALT U/L 13  --  19   AST U/L 14  --  30   GLUCOSE RANDOM mg/dL 218*   < > 151*    < > = values in this interval not displayed.         Results from last 7 days   Lab Units 01/31/24  1638 01/31/24  1122 01/31/24  0957   POC GLUCOSE mg/dl 174* 267* 345*         Results from last 7 days   Lab Units 01/27/24  0647   PROCALCITONIN ng/ml 0.16       Lines/Drains:  Invasive Devices       Peripheral Intravenous Line  Duration             Peripheral IV 01/31/24 Proximal;Right;Ventral (anterior) Forearm <1 day              Drain  Duration             External Urinary Catheter Small 2 days                          Imaging: No pertinent imaging reviewed.    Recent Cultures (last 7 days):   Results from last 7 days   Lab Units 01/27/24  1919 01/27/24  1554   BLOOD CULTURE   --  No Growth at 72 hrs.  No Growth at 72 hrs.   LEGIONELLA URINARY ANTIGEN  Negative  --        Last 24 Hours Medication List:   Current Facility-Administered Medications   Medication Dose Route Frequency Provider Last Rate    budesonide  1 mg Nebulization Q12H Peg Junior MD      chlorhexidine  15 mL Mouth/Throat Q12H [facility] Aden Navarrete DO      cyanocobalamin  1,000 mcg Oral Daily  Aden Navarrete, DO      ergocalciferol  50,000 Units Oral Weekly Aden Navarrete DO      guaiFENesin  1,200 mg Oral Q12H [facility] Sameer Jackson, DO      heparin (porcine)  5,000 Units Subcutaneous Q8H [facility] Olga Goodman, DO      insulin lispro  1-5 Units Subcutaneous HS Bubba Velasquez MD      insulin lispro  1-5 Units Subcutaneous TID AC Bubba Velasquez MD      ipratropium  0.5 mg Nebulization 4x Daily Aden Navarrete, DO      levalbuterol  1.25 mg Nebulization 4x Daily Sushant Arminda, DO      melatonin  6 mg Oral HS Aden Navarrete, DO      methylPREDNISolone sodium succinate  40 mg Intravenous Q8H [facility] Sameer Jackson, DO      potassium chloride  20 mEq Oral Daily Bubba Velasquez MD      pravastatin  80 mg Oral Daily With Dinner Aden Navarrete DO      senna  1 tablet Oral HS [facility] DO Maxine      sodium chloride  1 spray Each Nare Q1H PRN Aden Navarrete DO      sodium chloride  4 mL Nebulization 4x Daily Sushant Banmeet, DO      tamsulosin  0.4 mg Oral Daily With Dinner Peg Junior MD      torsemide  40 mg Oral Daily [facility] DO Maxine          Today, Patient Was Seen By: Bubba Velasquez MD    **Please Note: This note may have been constructed using a voice recognition system.**

## 2024-02-01 LAB
BACTERIA BLD CULT: NORMAL
BACTERIA BLD CULT: NORMAL
BUN SERPL-MCNC: 37 MG/DL (ref 5–25)
CALCIUM SERPL-MCNC: 8.7 MG/DL (ref 8.4–10.2)
CHLORIDE SERPL-SCNC: 83 MMOL/L (ref 96–108)
CO2 SERPL-SCNC: >45 MMOL/L (ref 21–32)
CREAT SERPL-MCNC: 0.93 MG/DL (ref 0.6–1.3)
GFR SERPL CREATININE-BSD FRML MDRD: 85 ML/MIN/1.73SQ M
GLUCOSE SERPL-MCNC: 177 MG/DL (ref 65–140)
GLUCOSE SERPL-MCNC: 246 MG/DL (ref 65–140)
GLUCOSE SERPL-MCNC: 250 MG/DL (ref 65–140)
GLUCOSE SERPL-MCNC: 264 MG/DL (ref 65–140)
GLUCOSE SERPL-MCNC: 273 MG/DL (ref 65–140)
GLUCOSE SERPL-MCNC: 300 MG/DL (ref 65–140)
MAGNESIUM SERPL-MCNC: 1.9 MG/DL (ref 1.9–2.7)
PHOSPHATE SERPL-MCNC: 2.3 MG/DL (ref 2.3–4.1)
POTASSIUM SERPL-SCNC: 3.9 MMOL/L (ref 3.5–5.3)
SODIUM SERPL-SCNC: 136 MMOL/L (ref 135–147)

## 2024-02-01 PROCEDURE — 94640 AIRWAY INHALATION TREATMENT: CPT

## 2024-02-01 PROCEDURE — 83735 ASSAY OF MAGNESIUM: CPT | Performed by: INTERNAL MEDICINE

## 2024-02-01 PROCEDURE — 94664 DEMO&/EVAL PT USE INHALER: CPT

## 2024-02-01 PROCEDURE — 99221 1ST HOSP IP/OBS SF/LOW 40: CPT

## 2024-02-01 PROCEDURE — 94668 MNPJ CHEST WALL SBSQ: CPT

## 2024-02-01 PROCEDURE — 82948 REAGENT STRIP/BLOOD GLUCOSE: CPT

## 2024-02-01 PROCEDURE — 94660 CPAP INITIATION&MGMT: CPT

## 2024-02-01 PROCEDURE — 80048 BASIC METABOLIC PNL TOTAL CA: CPT | Performed by: INTERNAL MEDICINE

## 2024-02-01 PROCEDURE — 94760 N-INVAS EAR/PLS OXIMETRY 1: CPT

## 2024-02-01 PROCEDURE — 84100 ASSAY OF PHOSPHORUS: CPT | Performed by: INTERNAL MEDICINE

## 2024-02-01 PROCEDURE — 99232 SBSQ HOSP IP/OBS MODERATE 35: CPT | Performed by: INTERNAL MEDICINE

## 2024-02-01 RX ORDER — INSULIN LISPRO 100 [IU]/ML
4 INJECTION, SOLUTION INTRAVENOUS; SUBCUTANEOUS
Status: DISCONTINUED | OUTPATIENT
Start: 2024-02-01 | End: 2024-02-04 | Stop reason: HOSPADM

## 2024-02-01 RX ORDER — INSULIN GLARGINE 100 [IU]/ML
12 INJECTION, SOLUTION SUBCUTANEOUS
Status: DISCONTINUED | OUTPATIENT
Start: 2024-02-01 | End: 2024-02-03

## 2024-02-01 RX ORDER — METHYLPREDNISOLONE SODIUM SUCCINATE 40 MG/ML
40 INJECTION, POWDER, LYOPHILIZED, FOR SOLUTION INTRAMUSCULAR; INTRAVENOUS EVERY 8 HOURS SCHEDULED
Status: DISCONTINUED | OUTPATIENT
Start: 2024-02-01 | End: 2024-02-02

## 2024-02-01 RX ORDER — METHYLPREDNISOLONE SODIUM SUCCINATE 40 MG/ML
40 INJECTION, POWDER, LYOPHILIZED, FOR SOLUTION INTRAMUSCULAR; INTRAVENOUS EVERY 12 HOURS SCHEDULED
Status: DISCONTINUED | OUTPATIENT
Start: 2024-02-01 | End: 2024-02-01

## 2024-02-01 RX ADMIN — METHYLPREDNISOLONE SODIUM SUCCINATE 40 MG: 40 INJECTION, POWDER, FOR SOLUTION INTRAMUSCULAR; INTRAVENOUS at 13:15

## 2024-02-01 RX ADMIN — POTASSIUM CHLORIDE 20 MEQ: 1500 TABLET, EXTENDED RELEASE ORAL at 08:52

## 2024-02-01 RX ADMIN — INSULIN LISPRO 4 UNITS: 100 INJECTION, SOLUTION INTRAVENOUS; SUBCUTANEOUS at 17:58

## 2024-02-01 RX ADMIN — CHLORHEXIDINE GLUCONATE 15 ML: 1.2 SOLUTION ORAL at 08:51

## 2024-02-01 RX ADMIN — Medication 1 SPRAY: at 13:19

## 2024-02-01 RX ADMIN — LEVALBUTEROL HYDROCHLORIDE 1.25 MG: 1.25 SOLUTION RESPIRATORY (INHALATION) at 19:11

## 2024-02-01 RX ADMIN — HEPARIN SODIUM 5000 UNITS: 5000 INJECTION INTRAVENOUS; SUBCUTANEOUS at 21:00

## 2024-02-01 RX ADMIN — SODIUM CHLORIDE SOLN NEBU 3% 4 ML: 3 NEBU SOLN at 11:13

## 2024-02-01 RX ADMIN — INSULIN LISPRO 2 UNITS: 100 INJECTION, SOLUTION INTRAVENOUS; SUBCUTANEOUS at 08:57

## 2024-02-01 RX ADMIN — TORSEMIDE 40 MG: 20 TABLET ORAL at 08:52

## 2024-02-01 RX ADMIN — PRAVASTATIN SODIUM 80 MG: 80 TABLET ORAL at 17:57

## 2024-02-01 RX ADMIN — INSULIN LISPRO 4 UNITS: 100 INJECTION, SOLUTION INTRAVENOUS; SUBCUTANEOUS at 13:11

## 2024-02-01 RX ADMIN — IPRATROPIUM BROMIDE 0.5 MG: 0.5 SOLUTION RESPIRATORY (INHALATION) at 07:45

## 2024-02-01 RX ADMIN — BUDESONIDE 1 MG: 0.5 INHALANT RESPIRATORY (INHALATION) at 07:44

## 2024-02-01 RX ADMIN — INSULIN LISPRO 4 UNITS: 100 INJECTION, SOLUTION INTRAVENOUS; SUBCUTANEOUS at 08:59

## 2024-02-01 RX ADMIN — GUAIFENESIN 1200 MG: 600 TABLET, EXTENDED RELEASE ORAL at 17:56

## 2024-02-01 RX ADMIN — LEVALBUTEROL HYDROCHLORIDE 1.25 MG: 1.25 SOLUTION RESPIRATORY (INHALATION) at 16:24

## 2024-02-01 RX ADMIN — HEPARIN SODIUM 5000 UNITS: 5000 INJECTION INTRAVENOUS; SUBCUTANEOUS at 05:13

## 2024-02-01 RX ADMIN — MELATONIN 6 MG: at 21:00

## 2024-02-01 RX ADMIN — METHYLPREDNISOLONE SODIUM SUCCINATE 40 MG: 40 INJECTION, POWDER, FOR SOLUTION INTRAMUSCULAR; INTRAVENOUS at 05:12

## 2024-02-01 RX ADMIN — CYANOCOBALAMIN TAB 500 MCG 1000 MCG: 500 TAB at 08:52

## 2024-02-01 RX ADMIN — BUDESONIDE 1 MG: 0.5 INHALANT RESPIRATORY (INHALATION) at 19:11

## 2024-02-01 RX ADMIN — LEVALBUTEROL HYDROCHLORIDE 1.25 MG: 1.25 SOLUTION RESPIRATORY (INHALATION) at 07:45

## 2024-02-01 RX ADMIN — INSULIN LISPRO 3 UNITS: 100 INJECTION, SOLUTION INTRAVENOUS; SUBCUTANEOUS at 13:11

## 2024-02-01 RX ADMIN — SODIUM CHLORIDE SOLN NEBU 3% 4 ML: 3 NEBU SOLN at 07:45

## 2024-02-01 RX ADMIN — METHYLPREDNISOLONE SODIUM SUCCINATE 40 MG: 40 INJECTION, POWDER, FOR SOLUTION INTRAMUSCULAR; INTRAVENOUS at 21:00

## 2024-02-01 RX ADMIN — INSULIN LISPRO 2 UNITS: 100 INJECTION, SOLUTION INTRAVENOUS; SUBCUTANEOUS at 21:01

## 2024-02-01 RX ADMIN — IPRATROPIUM BROMIDE 0.5 MG: 0.5 SOLUTION RESPIRATORY (INHALATION) at 11:13

## 2024-02-01 RX ADMIN — SENNOSIDES 8.6 MG: 8.6 TABLET, FILM COATED ORAL at 21:00

## 2024-02-01 RX ADMIN — SODIUM CHLORIDE SOLN NEBU 3% 4 ML: 3 NEBU SOLN at 19:12

## 2024-02-01 RX ADMIN — IPRATROPIUM BROMIDE 0.5 MG: 0.5 SOLUTION RESPIRATORY (INHALATION) at 19:11

## 2024-02-01 RX ADMIN — TAMSULOSIN HYDROCHLORIDE 0.4 MG: 0.4 CAPSULE ORAL at 17:57

## 2024-02-01 RX ADMIN — GUAIFENESIN 1200 MG: 600 TABLET, EXTENDED RELEASE ORAL at 05:11

## 2024-02-01 RX ADMIN — CHLORHEXIDINE GLUCONATE 15 ML: 1.2 SOLUTION ORAL at 20:58

## 2024-02-01 RX ADMIN — IPRATROPIUM BROMIDE 0.5 MG: 0.5 SOLUTION RESPIRATORY (INHALATION) at 16:24

## 2024-02-01 RX ADMIN — SODIUM CHLORIDE SOLN NEBU 3% 4 ML: 3 NEBU SOLN at 16:30

## 2024-02-01 RX ADMIN — INSULIN LISPRO 1 UNITS: 100 INJECTION, SOLUTION INTRAVENOUS; SUBCUTANEOUS at 17:58

## 2024-02-01 RX ADMIN — HEPARIN SODIUM 5000 UNITS: 5000 INJECTION INTRAVENOUS; SUBCUTANEOUS at 13:16

## 2024-02-01 RX ADMIN — INSULIN GLARGINE 12 UNITS: 100 INJECTION, SOLUTION SUBCUTANEOUS at 21:01

## 2024-02-01 RX ADMIN — LEVALBUTEROL HYDROCHLORIDE 1.25 MG: 1.25 SOLUTION RESPIRATORY (INHALATION) at 11:13

## 2024-02-01 NOTE — PROGRESS NOTES
[facility]  Progress Note  Name: Natalio Richardson I  MRN: [id number]  Unit/Bed#: [facility] 202-01 I Date of Admission: 1/27/2024   Date of Service: 2/1/2024 I Hospital Day: 5    Assessment/Plan   * Acute on chronic respiratory failure with hypoxia and hypercapnia (HCC)  Assessment & Plan  Patient is chronically on home oxygen between 4 to 6 L/min  Presented with severe hypoxia and shortness of breath  Placed on BiPAP in ED  Chest x-ray with mild pulmonary edema. Focal opacity in the right midlung which could represent pneumonia.     Patient is afebrile with leukocytosis  Patient currently on high flow nasal cannula this morning, weaned down to midlfow today  Continue to wean down oxygen as tolerated  Patient is full code  Likely secondary to end-stage COPD exacerbation   Palliative care consulted to assist with symptom management    End-stage COPD with acute exacerbation (HCC)  Assessment & Plan  Patient with end-stage COPD on chronic hypoxic respiratory failure on home oxygen, chronically on prednisone prednisone 5 mg daily, budesonide/Perforomist twice daily, DuoNebs 4 times daily.   Follow-up with pulmonology as an outpatient  Presented with COPD exacerbation, CO2 retention and respiratory acidosis  Continue prednisone taper  Oxygen requirements are improved, continue to wean down    Severe protein-calorie malnutrition (HCC)  Assessment & Plan  Malnutrition Findings:                                 BMI Findings:           Body mass index is 18.13 kg/m².       Pulmonary cachexia due to COPD (HCC)  Assessment & Plan  Malnutrition Findings:                                 BMI Findings:           Body mass index is 18.13 kg/m².       Chronic hypercapnia/KEVIN  Assessment & Plan  Patient on home BiPAP at bedtime    Hyperlipidemia  Assessment & Plan  Continue pravastatin 80 mg daily    Physical deconditioning  Assessment & Plan  Continue pt ot    Chronic hyponatremia  Assessment &  Plan  Continue to monitor off salt tabs    Chronic anemia  Assessment & Plan  Patient with macrocytic anemia, continue on B12 supplementation    Chronic HFrEF (heart failure with reduced ejection fraction)   Assessment & Plan  Wt Readings from Last 3 Encounters:   02/01/24 45 kg (99 lb 1.6 oz)   01/22/24 46.7 kg (103 lb)   01/10/24 43.5 kg (96 lb)     Previous cardiac echo on 5/1/2023 with EF 20%  Reviewing outpatient cardiology note this is secondary to dilated cardiomyopathy and left bundle branch block  Maintained on diuretics continue torsemide 40 mg daily          Pulmonary nodules/lesions, multiple  Assessment & Plan  Being followed by pulmonary as an outpatient    Prostate cancer (HCC)  Assessment & Plan  Status post TURP    BPH with obstruction/lower urinary tract symptoms  Assessment & Plan  Continue on tamsulosin, patient with history of BPH status post TURP, monitor blood pressure    Goals of care, counseling/discussion  Assessment & Plan  Patient is a level 1 full code    Obstructive sleep apnea  Assessment & Plan  Patient with history of KEVIN, uses trilogy vent at bedtime with supplemental oxygen of 4 L               VTE Pharmacologic Prophylaxis: VTE Score: 3 Moderate Risk (Score 3-4) - Pharmacological DVT Prophylaxis Ordered: heparin.    Mobility:   Basic Mobility Inpatient Raw Score: 19  JH-HLM Goal: 6: Walk 10 steps or more  JH-HLM Achieved: 3: Sit at edge of bed  HLM Goal NOT achieved. Continue with multidisciplinary rounding and encourage appropriate mobility to improve upon HLM goals.    Patient Centered Rounds: I performed bedside rounds with nursing staff today.   Discussions with Specialists or Other Care Team Provider: nurse, CM, pulmonology, palliative care    Education and Discussions with Family / Patient: Updated  (wife) via phone.    Total Time Spent on Date of Encounter in care of patient: 40 mins. This time was spent on one or more of the following: performing physical  exam; counseling and coordination of care; obtaining or reviewing history; documenting in the medical record; reviewing/ordering tests, medications or procedures; communicating with other healthcare professionals and discussing with patient's family/caregivers.    Current Length of Stay: 5 day(s)  Current Patient Status: Inpatient   Certification Statement: The patient will continue to require additional inpatient hospital stay due to wean down oxygen, discharge planning  Discharge Plan: Anticipate discharge in 24-48 hrs to home with home services.    Code Status: Level 1 - Full Code    Subjective:   Denies any new complaints. Breathing is gradually improving    Objective:     Vitals:   Temp (24hrs), Av.9 °F (36.6 °C), Min:97.3 °F (36.3 °C), Max:98.2 °F (36.8 °C)    Temp:  [97.3 °F (36.3 °C)-98.2 °F (36.8 °C)] 98.2 °F (36.8 °C)  HR:  [] 106  Resp:  [22-24] 24  BP: ()/(56-77) 105/77  SpO2:  [97 %-100 %] 100 %  Body mass index is 18.13 kg/m².     Input and Output Summary (last 24 hours):     Intake/Output Summary (Last 24 hours) at 2024 1636  Last data filed at 2024 1320  Gross per 24 hour   Intake 570 ml   Output 1750 ml   Net -1180 ml       Physical Exam:   Physical Exam  Constitutional:       Comments: cachectic   HENT:      Head: Normocephalic and atraumatic.      Nose: Nose normal.      Mouth/Throat:      Mouth: Mucous membranes are moist.      Pharynx: Oropharynx is clear.   Eyes:      Extraocular Movements: Extraocular movements intact.   Cardiovascular:      Rate and Rhythm: Normal rate and regular rhythm.   Pulmonary:      Comments: Distant breath sounds, no audible wheeze  Abdominal:      Palpations: Abdomen is soft.   Musculoskeletal:      Right lower leg: No edema.      Left lower leg: No edema.   Skin:     General: Skin is warm and dry.   Neurological:      General: No focal deficit present.      Mental Status: He is alert and oriented to person, place, and time.   Psychiatric:          Mood and Affect: Mood normal.         Behavior: Behavior normal.          Additional Data:     Labs:  Results from last 7 days   Lab Units 01/31/24  0440 01/29/24  0449 01/28/24  0821   WBC Thousand/uL 4.58   < > 12.89*   HEMOGLOBIN g/dL 9.7*   < > 8.3*   HEMATOCRIT % 31.6*   < > 27.4*   PLATELETS Thousands/uL 228   < > 189   BANDS PCT %  --   --  1   NEUTROS PCT % 87*   < >  --    LYMPHS PCT % 5*   < >  --    LYMPHO PCT %  --   --  2*   MONOS PCT % 7   < >  --    MONO PCT %  --   --  2*   EOS PCT % 0   < > 0    < > = values in this interval not displayed.     Results from last 7 days   Lab Units 02/01/24  0507 01/31/24  0440 01/30/24  0455 01/29/24  0449   SODIUM mmol/L 136 139   < > 135   POTASSIUM mmol/L 3.9 3.8   < > 4.5   CHLORIDE mmol/L 83* 85*   < > 90*   CO2 mmol/L >45* >45*   < > 38*   BUN mg/dL 37* 36*   < > 34*   CREATININE mg/dL 0.93 0.85   < > 0.77   ANION GAP mmol/L  --   --   --  7   CALCIUM mg/dL 8.7 8.8   < > 9.3   ALBUMIN g/dL  --  3.5  --  3.7   TOTAL BILIRUBIN mg/dL  --  0.32  --  0.35   ALK PHOS U/L  --  60  --  61   ALT U/L  --  13  --  19   AST U/L  --  14  --  30   GLUCOSE RANDOM mg/dL 246* 218*   < > 151*    < > = values in this interval not displayed.         Results from last 7 days   Lab Units 02/01/24  1614 02/01/24  1150 02/01/24  0731 02/01/24  0506 01/31/24  2113 01/31/24  1909 01/31/24  1638 01/31/24  1122 01/31/24  0957   POC GLUCOSE mg/dl 177* 300* 264* 250* 142* 216* 174* 267* 345*         Results from last 7 days   Lab Units 01/27/24  0647   PROCALCITONIN ng/ml 0.16       Lines/Drains:  Invasive Devices       Peripheral Intravenous Line  Duration             Peripheral IV 01/31/24 Proximal;Right;Ventral (anterior) Forearm 1 day              Drain  Duration             External Urinary Catheter Small 3 days                          Imaging: No pertinent imaging reviewed.    Recent Cultures (last 7 days):   Results from last 7 days   Lab Units 01/27/24  1919 01/27/24  1550    BLOOD CULTURE   --  No Growth After 4 Days.  No Growth After 4 Days.   LEGIONELLA URINARY ANTIGEN  Negative  --        Last 24 Hours Medication List:   Current Facility-Administered Medications   Medication Dose Route Frequency Provider Last Rate    budesonide  1 mg Nebulization Q12H Peg Junior MD      chlorhexidine  15 mL Mouth/Throat Q12H [facility] Aden Navarrete, DO      cyanocobalamin  1,000 mcg Oral Daily Aedn Navarrete, DO      ergocalciferol  50,000 Units Oral Weekly Aden Navarrete DO      guaiFENesin  1,200 mg Oral Q12H [facility] Sameer Jackson, DO      heparin (porcine)  5,000 Units Subcutaneous Q8H [facility] Olga Goodman, DO      insulin glargine  12 Units Subcutaneous HS Bubba Velasquez MD      insulin lispro  1-5 Units Subcutaneous HS Bubba Velasquez MD      insulin lispro  1-5 Units Subcutaneous TID AC Bubba Velasquez MD      insulin lispro  4 Units Subcutaneous TID With Meals Bubba Velasquez MD      ipratropium  0.5 mg Nebulization 4x Daily Aden Navarrete, DO      levalbuterol  1.25 mg Nebulization 4x Daily Sushant Banmeet, DO      melatonin  6 mg Oral HS Aden Navarrete, DO      methylPREDNISolone sodium succinate  40 mg Intravenous Q8H [facility] Sameer Jackson, DO      potassium chloride  20 mEq Oral Daily Bubba Velasquez MD      pravastatin  80 mg Oral Daily With Dinner Aden Navarrete DO      senna  1 tablet Oral HS Olga Goodman DO      sodium chloride  1 spray Each Nare Q1H PRN Aden Navarrete DO      sodium chloride  4 mL Nebulization 4x Daily Sushant Banai, DO      tamsulosin  0.4 mg Oral Daily With Dinner Peg Junior MD      torsemide  40 mg Oral Daily Olga Goodman DO          Today, Patient Was Seen By: Bubba Velasquez MD    **Please Note: This note may have been constructed using a voice recognition system.**

## 2024-02-01 NOTE — PROGRESS NOTES
"Progress Note - Pulmonary   Natalio Hartmann Jr. 68 y.o. male MRN: [id number]  Unit/Bed#: [facility] 202-01 Encounter: [id number]    Assessment:  Acute on chronic hypoxic and hypercapnic respiratory failure   - 3 to 6L home oxygen  Very severe COPD  - FEV1 22% predicted; on chronic Prednisone  HFpEF  Pulmonary cachexia  CAD  Chronic hyponatremia  Hx of prostate CA, 2021  BPH  Chronic anemia  Type II DM  KEVIN  - nocturnal Trilogy  Nicotine dependence, cigarettes, in remission    Plan:  - Continue AVAPS qHs and PRN   - Continue methylpred to 40mg IV q8h today  - continue Pulmicort q12h, holding home formoterol   - continue Xopenex/Atrovent QID   - Continue airway clearance with flutter valve, guaifenesin, and 3% saline nebs for further mucus clearance   - Wean supplemental O2 to goal SpO2 >88%  - Palliative consult to discuss further therapies to improve air hunger in end-stage COPD       Subjective:   Patient seen and examined at bedside. Continues AVAPS at [facility]. Now on midflow during day. This morning, patient reports breathing is \"so so\" but is more tachypnic on exam. Wife at bedside states that he appears to be struggling more today.     Objective:     Vitals: Blood pressure 97/66, pulse 96, temperature 97.8 °F (36.6 °C), temperature source Axillary, resp. rate (!) 24, weight 45 kg (99 lb 1.6 oz), SpO2 100%.,Body mass index is 18.13 kg/m².      Intake/Output Summary (Last 24 hours) at 2/1/2024 0729  Last data filed at 2/1/2024 0501  Gross per 24 hour   Intake 538 ml   Output 1950 ml   Net -1412 ml       Invasive Devices       Peripheral Intravenous Line  Duration             Peripheral IV 01/31/24 Proximal;Right;Ventral (anterior) Forearm 1 day              Drain  Duration             External Urinary Catheter Small 2 days                    Physical Exam:    General: No acute distress  HENT: Normocephalic, atraumatic.  PERRL, EOMI, Moist mucous membranes.  Neck: Supple  Lungs: Distant breath sounds bilaterally with diffuse " "expiratory wheezing, on 10L midflow  Heart: Regular rate and rhythm, S1-S2 present, no murmurs rubs or gallops  Abdomen: Soft, nontender, nondistended  Extremities: Warm and well perfused, no cyanosis, clubbing, or edema  Skin: Warm, dry, no rashes or lesions  Neurologic: No focal neurologic deficits     Labs: CBC: No results found for: \"WBC\", \"HGB\", \"HCT\", \"MCV\", \"PLT\", \"ADJUSTEDWBC\", \"RBC\", \"MCH\", \"MCHC\", \"RDW\", \"MPV\", \"NRBC\", CMP:   Lab Results   Component Value Date    SODIUM 136 02/01/2024    K 3.9 02/01/2024    CL 83 (L) 02/01/2024    CO2 >45 (HH) 02/01/2024    BUN 37 (H) 02/01/2024    CREATININE 0.93 02/01/2024    CALCIUM 8.7 02/01/2024    EGFR 85 02/01/2024     Imaging and other studies: I have personally reviewed pertinent films in PACS    "

## 2024-02-01 NOTE — PLAN OF CARE
Problem: SAFETY ADULT  Goal: Patient will remain free of falls  Description: INTERVENTIONS:  - Educate patient/family on patient safety including physical limitations  - Instruct patient to call for assistance with activity   - Consult OT/PT to assist with strengthening/mobility   - Keep Call bell within reach  - Keep bed low and locked with side rails adjusted as appropriate  - Keep care items and personal belongings within reach  - Initiate and maintain comfort rounds  - Make Fall Risk Sign visible to staff  - Offer Toileting every 2 Hours, in advance of need  - Initiate/Maintain bed alarm  - Obtain necessary fall risk management equipment:   - Apply yellow socks and bracelet for high fall risk patients  - Consider moving patient to room near nurses station  Outcome: Progressing     Problem: RESPIRATORY - ADULT  Goal: Achieves optimal ventilation and oxygenation  Description: INTERVENTIONS:  - Assess for changes in respiratory status  - Assess for changes in mentation and behavior  - Position to facilitate oxygenation and minimize respiratory effort  - Oxygen administered by appropriate delivery if ordered  - Initiate smoking cessation education as indicated  - Encourage broncho-pulmonary hygiene including cough, deep breathe, Incentive Spirometry  - Assess the need for suctioning and aspirate as needed  - Assess and instruct to report SOB or any respiratory difficulty  - Respiratory Therapy support as indicated  Outcome: Progressing

## 2024-02-01 NOTE — ASSESSMENT & PLAN NOTE
Malnutrition Findings:                                 BMI Findings:           Body mass index is 18.13 kg/m².

## 2024-02-01 NOTE — ASSESSMENT & PLAN NOTE
Patient with end-stage COPD on chronic hypoxic respiratory failure on home oxygen, chronically on prednisone prednisone 5 mg daily, budesonide/Perforomist twice daily, DuoNebs 4 times daily.   Follow-up with pulmonology as an outpatient  Presented with COPD exacerbation, CO2 retention and respiratory acidosis  Continue prednisone taper  Oxygen requirements are improved, continue to wean down

## 2024-02-01 NOTE — ASSESSMENT & PLAN NOTE
Wt Readings from Last 3 Encounters:   02/01/24 45 kg (99 lb 1.6 oz)   01/22/24 46.7 kg (103 lb)   01/10/24 43.5 kg (96 lb)     Previous cardiac echo on 5/1/2023 with EF 20%  Reviewing outpatient cardiology note this is secondary to dilated cardiomyopathy and left bundle branch block  Maintained on diuretics continue torsemide 40 mg daily

## 2024-02-01 NOTE — RESPIRATORY THERAPY NOTE
Resp Care   02/01/24 1417   Respiratory Assessment   Resp Comments Decreased MFNC to 8L   Oxygen Therapy/Pulse Ox   O2 Device Mid flow nasal cannula   O2 Therapy Oxygen humidified   Nasal Cannula O2 Flow Rate (L/min) 8 L/min   Calculated FIO2 (%) - Nasal Cannula 52   SpO2 Activity At Rest   $ Pulse Oximetry Spot Check Charge Completed

## 2024-02-01 NOTE — CONSULTS
Consultation - Wound Care   Natalio Hartmann Jr. 68 y.o. male MRN: [id number]  [facility] Encounter: [id number]    Assessment:  Healed wound   Severe protein calorie malnutrition  Ambulatory dysfunction      Plan:  Mid sacrum with intact blanchable scar tissue.  No active pressure injury at time of the assessment however patient is at high risk for pressure injury development due to multiple factors including poor nutrition and history of previous pressure injury.  Will recommend preventative skin care plan with foam dressings to the bilateral heels and sacrum.  Educated patient and his wife on pressure relief  No clinical s/s of infection present   A1C results reviewed with the patient today.    Result of 6.5 noted on 8/30/2023  Managed by primary team   Will recommend preventative nursing skin care measures  Pressure relief- offloading of pressure with turning/repositioning as patient medically tolerates, heel elevation, foam wedges for offloading/repositioning, and waffle cushion to chair.  Nutrition consult placed  Patient and wife verbalized understanding of plan of care.  Wound care will sign off at this time, please re-consult if needed.        History of Present Illness:  Patient is a 68-year-old male who was admitted to the hospital with acute on chronic respiratory failure with hypoxia and hypercapnia.  Patient has a history of COPD, malnutrition, DM, heart failure, cachexia, and prostate cancer.  Wound care consulted for sacral pressure injury.  Patient is cooperative and agreeable for the assessment.  Wife is at bedside who provides wound history.  Patients wife reports history of wound to sacral area since a previous hospitalization.  Wife states that the area opens and closes often, and states that patient spends a lot of time sitting in the recliner chair.  Wife reports they have been putting barrier cream to the area. Denies fever, chills, or increased pain related to the  wound.    Subjective:    Review of Systems   Constitutional:  Positive for fatigue. Negative for chills and fever.   HENT:  Negative for congestion and sneezing.    Respiratory:  Negative for cough.    Musculoskeletal:  Positive for gait problem.   Skin:  Positive for wound.   Psychiatric/Behavioral:  Negative for agitation.        Historical Information   Past Medical History:   Diagnosis Date    Acute metabolic encephalopathy 03/29/2022    Arthritis     Bladder mass     Cardiomyopathy (HCC)     Chest pain     COPD (chronic obstructive pulmonary disease) (HCC)     COVID-19 virus infection 02/23/2023    CPAP (continuous positive airway pressure) dependence     Emphysema of lung (HCC)     Hypoxia     nocturnal    Left bundle branch block     Multiple pulmonary nodules     last assessed: 10/12/16    Pneumonia     Sleep apnea, obstructive     Smoker     Weight loss 08/29/2019     Past Surgical History:   Procedure Laterality Date    COLONOSCOPY      CYSTOSCOPY      CYSTOSCOPY  02/17/2021    MS BLADDER INSTILLATION ANTICARCINOGENIC AGENT N/A 1/3/2020    Procedure: INSTILLATION MITOMYCIN;  Surgeon: Sundeep Canchola MD;  [facility]: [facility];  Service: Urology    MS CYSTO W/REMOVAL OF LESIONS SMALL N/A 1/3/2020    Procedure: TRANSURETHRAL RESECTION OF BLADDER TUMOR (TURBT);  Surgeon: Sundeep Canchola MD;  [facility];  Service: Urology    MS CYSTOURETHROSCOPY WITH BIOPSY N/A 4/13/2021    Procedure: CYSTOSCOPY WITH BIOPSIES;  Surgeon: Sundeep Canchola MD;  [facility];  Service: Urology    MS TRURL ELECTROSURG RESCJ PROSTATE BLEED COMPLETE N/A 4/13/2021    Procedure: TRANSURETHRAL RESECTION OF PROSTATE (TURP) BLADDER BIOPSY, FULGURATION;  Surgeon: Sundeep Canchola MD;  [facility];  Service: Urology     Social History   Social History     Substance and Sexual Activity   Alcohol Use Not Currently    Comment: rarely     Social History     Substance and Sexual Activity   Drug Use No      E-Cigarette/Vaping    E-Cigarette Use Never User      E-Cigarette/Vaping Substances    Nicotine No     THC No     CBD No     Flavoring No     Other No     Unknown No      Social History     Tobacco Use   Smoking Status Former    Current packs/day: 0.00    Average packs/day: 2.5 packs/day for 42.0 years (105.0 ttl pk-yrs)    Types: Cigarettes    Start date:     Quit date:     Years since quittin.0   Smokeless Tobacco Former   Tobacco Comments    1 ppd for 37 years, 2010 down to 5 cigs a day, is around second hand smoke     Family History:   Family History   Problem Relation Age of Onset    Diabetes Mother        Meds/Allergies   current meds:   Current Facility-Administered Medications   Medication Dose Route Frequency    budesonide (PULMICORT) inhalation solution 1 mg  1 mg Nebulization Q12H    chlorhexidine (PERIDEX) 0.12 % oral rinse 15 mL  15 mL Mouth/Throat Q12H [facility]    cyanocobalamin (VITAMIN B-12) tablet 1,000 mcg  1,000 mcg Oral Daily    ergocalciferol (VITAMIN D2) capsule 50,000 Units  50,000 Units Oral Weekly    guaiFENesin (MUCINEX) 12 hr tablet 1,200 mg  1,200 mg Oral Q12H GABE    heparin (porcine) subcutaneous injection 5,000 Units  5,000 Units Subcutaneous Q8H [facility]    insulin glargine (LANTUS) subcutaneous injection 12 Units 0.12 mL  12 Units Subcutaneous HS    insulin lispro (HumaLOG) 100 units/mL subcutaneous injection 1-5 Units  1-5 Units Subcutaneous HS    insulin lispro (HumaLOG) 100 units/mL subcutaneous injection 1-5 Units  1-5 Units Subcutaneous TID AC    insulin lispro (HumaLOG) 100 units/mL subcutaneous injection 4 Units  4 Units Subcutaneous TID With Meals    ipratropium (ATROVENT) 0.02 % inhalation solution 0.5 mg  0.5 mg Nebulization 4x Daily    levalbuterol (XOPENEX) inhalation solution 1.25 mg  1.25 mg Nebulization 4x Daily    melatonin tablet 6 mg  6 mg Oral HS    methylPREDNISolone sodium succinate (Solu-MEDROL) injection 40 mg  40 mg Intravenous Q8H [facility]    potassium  chloride (Klor-Con M20) CR tablet 20 mEq  20 mEq Oral Daily    pravastatin (PRAVACHOL) tablet 80 mg  80 mg Oral Daily With Dinner    senna (SENOKOT) tablet 8.6 mg  1 tablet Oral HS    sodium chloride (OCEAN) 0.65 % nasal spray 1 spray  1 spray Each Nare Q1H PRN    sodium chloride 3 % inhalation solution 4 mL  4 mL Nebulization 4x Daily    tamsulosin (FLOMAX) capsule 0.4 mg  0.4 mg Oral Daily With Dinner    torsemide (DEMADEX) tablet 40 mg  40 mg Oral Daily     No Known Allergies    Objective   Vitals: Blood pressure 105/77, pulse (!) 106, temperature 98.2 °F (36.8 °C), temperature source Oral, resp. rate (!) 24, weight 45 kg (99 lb 1.6 oz), SpO2 100%.         Physical Exam  Constitutional:       General: He is awake. He is not in acute distress.     Appearance: He is cachectic. He is ill-appearing. He is not diaphoretic.      Interventions: Nasal cannula in place.   HENT:      Head: Normocephalic and atraumatic.      Right Ear: External ear normal.      Left Ear: External ear normal.   Eyes:      Conjunctiva/sclera: Conjunctivae normal.   Cardiovascular:      Rate and Rhythm: Tachycardia present.      Comments: Noted on cardiac monitor.  Pulmonary:      Effort: Pulmonary effort is normal. No respiratory distress.   Genitourinary:     Comments: Incontinent of bowel and bladder per nursing flowsheets, condom catheter in place for urinary management.  Musculoskeletal:      Comments: Patient seen in bed, dependent for care, moderate assist of 1 with turning for the assessment.  Foam wedges are used for repositioning.   Skin:     General: Skin is warm and dry.      Findings: Wound present.      Comments: 1.  B/l sacro-buttocks are dry and intact with blanchable hyperpigmented skin and hypopigmented scar tissue that is nontender with palpation on exam.  No open aspect, redness, or drainage at time of the assessment exam.  2. B/l heels are dry and intact without redness or wounds.       No induration, fluctuance, odor,  "warmth/temperature differences, redness, or purulence noted to the above mentioned skin areas assessed. New dressings applied as noted above. Patient tolerated assessment well- denies pain and no s/s of non-verbal pain or discomfort observed during the encounter.     Neurological:      Mental Status: He is alert.   Psychiatric:         Behavior: Behavior is cooperative.           Lab, Imaging and other studies: I have personally reviewed pertinent reports.      Code Status: Level 1 - Full Code      Counseling / Coordination of Care  Total time spent today:    Total time (face-to-face and non-face-to-face) spent on today's visit was 25 minutes. This includes preparation for the visits (H&P on 1/27/24 and SLIM note from 1/31/24) performance of a medically appropriate history and examination, and orders for medications/treatments or testing.  Discussed assessment findings, and plan of care/recommendations with patients RN.      DELIA Chaudhry, HIRAM-C, CRISTHIAN      Portions of the record may have been created with voice recognition software.  Occasional wrong word or \"sound a like\" substitutions may have occurred due to the inherent limitations of voice recognition software.  Read the chart carefully and recognize, using context, where substitutions have occurred      "

## 2024-02-01 NOTE — CASE MANAGEMENT
Case Management Discharge Planning Note    Patient name Natalio Hartmann Jr.  Location [facility]  MRN [id number]  : 1955 Date 2024       Current Admission Date: 2024  Current Admission Diagnosis:Acute on chronic respiratory failure with hypoxia and hypercapnia (HCC)   Patient Active Problem List    Diagnosis Date Noted    Dependence on respirator (ventilator) status ([facility]) 01/10/2024    Severe protein-calorie malnutrition (HCC) 10/14/2023    Elevated troponin 2023    History of bladder cancer 2023    Pulmonary cachexia due to COPD ([facility])     Chronic hypercapnia/KEVIN 2023    Metabolic encephalopathy 2023    End-stage COPD with acute exacerbation ([facility]) 2023    Palliative care patient 2023    Alkalosis 2023    Malnutrition ([facility]) 06/10/2023    Hyperlipidemia 2023    Steroid-induced hyperglycemia 2023    Physical deconditioning 2023    Chronic anemia 2023    SIRS (systemic inflammatory response syndrome) 2023    Chronic hyponatremia 2023    Chronic HFrEF (heart failure with reduced ejection fraction)  2022    Protein-calorie malnutrition (HCC) 2022    Pulmonary nodules/lesions, multiple 2022    Prostate cancer ([facility]) 2021    BPH with obstruction/lower urinary tract symptoms 2021    Goals of care, counseling/discussion 2021    Acute on chronic respiratory failure with hypoxia and hypercapnia ([facility]) 2020    Macrocytosis without anemia 2019    Other insomnia 2019    GERD (gastroesophageal reflux disease) 2017    Nonobstructive atherosclerosis of coronary artery 2016    Mood disorder ([facility]) 2015    Prediabetes 2015    Osteoporosis 10/14/2014    Vitamin D deficiency 10/14/2014    Nonischemic cardiomyopathy ([facility]) 2014    Left bundle-branch block 2013    Osteoarthritis 2013    Obstructive sleep apnea 2013      LOS (days):  5  Geometric Mean LOS (GMLOS) (days): 4.1  Days to GMLOS:-1.2     OBJECTIVE:  Risk of Unplanned Readmission Score: 43.96         Current admission status: Inpatient   Preferred Pharmacy:   [id number] - [place], [place] - [address]  [address]  [place] [address]  Phone: [phone] Fax: [phone]    Primary Care Provider: Fatmata Gustafson DO    Primary Insurance: MEDICARE  Secondary Insurance: BLUE CROSS    DISCHARGE DETAILS:    Discharge planning discussed with:: Patient  Freedom of Choice: Yes  Comments - Freedom of Choice: CM spoke to patient regarding home PT/OT recommendation. Patient declining services at this time.  CM contacted family/caregiver?: No- see comments (Patient alert and oriented)  Were Treatment Team discharge recommendations reviewed with patient/caregiver?: Yes  Did patient/caregiver verbalize understanding of patient care needs?: Yes  Were patient/caregiver advised of the risks associated with not following Treatment Team discharge recommendations?: Yes         Requested Home Health Care         Is the patient interested in HHC at discharge?: No                   Treatment Team Recommendation: Home with Home Health Care  Discharge Destination Plan:: Home  Transport at Discharge : Family

## 2024-02-01 NOTE — PLAN OF CARE
Problem: SAFETY ADULT  Goal: Maintain or return to baseline ADL function  Description: INTERVENTIONS:  -  Assess patient's ability to carry out ADLs; assess patient's baseline for ADL function and identify physical deficits which impact ability to perform ADLs (bathing, care of mouth/teeth, toileting, grooming, dressing, etc.)  - Assess/evaluate cause of self-care deficits   - Assess range of motion  - Assess patient's mobility; develop plan if impaired  - Assess patient's need for assistive devices and provide as appropriate  - Encourage maximum independence but intervene and supervise when necessary  - Involve family in performance of ADLs  - Assess for home care needs following discharge   - Consider OT consult to assist with ADL evaluation and planning for discharge  - Provide patient education as appropriate  Outcome: Progressing     Problem: MOBILITY - ADULT  Goal: Maintain or return to baseline ADL function  Description: INTERVENTIONS:  -  Assess patient's ability to carry out ADLs; assess patient's baseline for ADL function and identify physical deficits which impact ability to perform ADLs (bathing, care of mouth/teeth, toileting, grooming, dressing, etc.)  - Assess/evaluate cause of self-care deficits   - Assess range of motion  - Assess patient's mobility; develop plan if impaired  - Assess patient's need for assistive devices and provide as appropriate  - Encourage maximum independence but intervene and supervise when necessary  - Involve family in performance of ADLs  - Assess for home care needs following discharge   - Consider OT consult to assist with ADL evaluation and planning for discharge  - Provide patient education as appropriate  Outcome: Progressing     Problem: Nutrition/Hydration-ADULT  Goal: Nutrient/Hydration intake appropriate for improving, restoring or maintaining nutritional needs  Description: Monitor and assess patient's nutrition/hydration status for malnutrition. Collaborate  with interdisciplinary team and initiate plan and interventions as ordered.  Monitor patient's weight and dietary intake as ordered or per policy. Utilize nutrition screening tool and intervene as necessary. Determine patient's food preferences and provide high-protein, high-caloric foods as appropriate.     INTERVENTIONS:  - Monitor oral intake, urinary output, labs, and treatment plans  - Assess nutrition and hydration status and recommend course of action  - Evaluate amount of meals eaten  - Assist patient with eating if necessary   - Allow adequate time for meals  - Recommend/ encourage appropriate diets, oral nutritional supplements, and vitamin/mineral supplements  - Order, calculate, and assess calorie counts as needed  - Recommend, monitor, and adjust tube feedings and TPN/PPN based on assessed needs  - Assess need for intravenous fluids  - Provide specific nutrition/hydration education as appropriate  - Include patient/family/caregiver in decisions related to nutrition  Outcome: Progressing     Problem: Prexisting or High Potential for Compromised Skin Integrity  Goal: Skin integrity is maintained or improved  Description: INTERVENTIONS:  - Identify patients at risk for skin breakdown  - Assess and monitor skin integrity  - Assess and monitor nutrition and hydration status  - Monitor labs   - Assess for incontinence   - Turn and reposition patient  - Assist with mobility/ambulation  - Relieve pressure over bony prominences  - Avoid friction and shearing  - Provide appropriate hygiene as needed including keeping skin clean and dry  - Evaluate need for skin moisturizer/barrier cream  - Collaborate with interdisciplinary team   - Patient/family teaching  - Consider wound care consult   Outcome: Progressing

## 2024-02-01 NOTE — ASSESSMENT & PLAN NOTE
Patient is chronically on home oxygen between 4 to 6 L/min  Presented with severe hypoxia and shortness of breath  Placed on BiPAP in ED  Chest x-ray with mild pulmonary edema. Focal opacity in the right midlung which could represent pneumonia.     Patient is afebrile with leukocytosis  Patient currently on high flow nasal cannula this morning, weaned down to midlfow today  Continue to wean down oxygen as tolerated  Patient is full code  Likely secondary to end-stage COPD exacerbation   Palliative care consulted to assist with symptom management

## 2024-02-01 NOTE — ASSESSMENT & PLAN NOTE
Malnutrition Findings:                        Nutritional and lifestyle changes discussed         BMI Findings:           Body mass index is 18.13 kg/m².

## 2024-02-02 LAB
ARTERIAL PATENCY WRIST A: YES
BASE EX.OXY STD BLDV CALC-SCNC: 84.7 % (ref 60–80)
BASE EXCESS BLDV CALC-SCNC: 26 MMOL/L
BUN SERPL-MCNC: 34 MG/DL (ref 5–25)
CALCIUM SERPL-MCNC: 9 MG/DL (ref 8.4–10.2)
CHLORIDE SERPL-SCNC: 83 MMOL/L (ref 96–108)
CO2 SERPL-SCNC: >45 MMOL/L (ref 21–32)
CREAT SERPL-MCNC: 0.76 MG/DL (ref 0.6–1.3)
GFR SERPL CREATININE-BSD FRML MDRD: 95 ML/MIN/1.73SQ M
GLUCOSE SERPL-MCNC: 140 MG/DL (ref 65–140)
GLUCOSE SERPL-MCNC: 146 MG/DL (ref 65–140)
GLUCOSE SERPL-MCNC: 193 MG/DL (ref 65–140)
GLUCOSE SERPL-MCNC: 248 MG/DL (ref 65–140)
GLUCOSE SERPL-MCNC: 280 MG/DL (ref 65–140)
HCO3 BLDV-SCNC: 54.4 MMOL/L (ref 24–30)
NON VENT- BIPAP: ABNORMAL
O2 CT BLDV-SCNC: 12.3 ML/DL
PCO2 BLDV: 83.5 MM HG (ref 42–50)
PH BLDV: 7.43 [PH] (ref 7.3–7.4)
PO2 BLDV: 51.4 MM HG (ref 35–45)
POTASSIUM SERPL-SCNC: 3.9 MMOL/L (ref 3.5–5.3)
SODIUM SERPL-SCNC: 138 MMOL/L (ref 135–147)

## 2024-02-02 PROCEDURE — 94760 N-INVAS EAR/PLS OXIMETRY 1: CPT

## 2024-02-02 PROCEDURE — 82805 BLOOD GASES W/O2 SATURATION: CPT | Performed by: INTERNAL MEDICINE

## 2024-02-02 PROCEDURE — 99232 SBSQ HOSP IP/OBS MODERATE 35: CPT | Performed by: INTERNAL MEDICINE

## 2024-02-02 PROCEDURE — 97116 GAIT TRAINING THERAPY: CPT

## 2024-02-02 PROCEDURE — 94664 DEMO&/EVAL PT USE INHALER: CPT

## 2024-02-02 PROCEDURE — 94668 MNPJ CHEST WALL SBSQ: CPT

## 2024-02-02 PROCEDURE — 97535 SELF CARE MNGMENT TRAINING: CPT

## 2024-02-02 PROCEDURE — 94640 AIRWAY INHALATION TREATMENT: CPT

## 2024-02-02 PROCEDURE — 99223 1ST HOSP IP/OBS HIGH 75: CPT | Performed by: FAMILY MEDICINE

## 2024-02-02 PROCEDURE — 94640 AIRWAY INHALATION TREATMENT: CPT | Performed by: SOCIAL WORKER

## 2024-02-02 PROCEDURE — 80048 BASIC METABOLIC PNL TOTAL CA: CPT | Performed by: INTERNAL MEDICINE

## 2024-02-02 PROCEDURE — 94660 CPAP INITIATION&MGMT: CPT

## 2024-02-02 PROCEDURE — 82948 REAGENT STRIP/BLOOD GLUCOSE: CPT

## 2024-02-02 RX ORDER — ACETAZOLAMIDE 250 MG/1
250 TABLET ORAL DAILY
Status: DISCONTINUED | OUTPATIENT
Start: 2024-02-02 | End: 2024-02-04 | Stop reason: HOSPADM

## 2024-02-02 RX ORDER — TORSEMIDE 20 MG/1
20 TABLET ORAL DAILY
Status: DISCONTINUED | OUTPATIENT
Start: 2024-02-03 | End: 2024-02-03

## 2024-02-02 RX ORDER — POTASSIUM CHLORIDE 20 MEQ/1
20 TABLET, EXTENDED RELEASE ORAL
Status: COMPLETED | OUTPATIENT
Start: 2024-02-02 | End: 2024-02-02

## 2024-02-02 RX ORDER — METHYLPREDNISOLONE SODIUM SUCCINATE 40 MG/ML
40 INJECTION, POWDER, LYOPHILIZED, FOR SOLUTION INTRAMUSCULAR; INTRAVENOUS EVERY 12 HOURS SCHEDULED
Status: DISCONTINUED | OUTPATIENT
Start: 2024-02-02 | End: 2024-02-03

## 2024-02-02 RX ORDER — DOCUSATE SODIUM 100 MG/1
100 CAPSULE, LIQUID FILLED ORAL 2 TIMES DAILY
Status: DISCONTINUED | OUTPATIENT
Start: 2024-02-02 | End: 2024-02-04 | Stop reason: HOSPADM

## 2024-02-02 RX ORDER — SENNOSIDES 8.6 MG
2 TABLET ORAL DAILY
Status: DISCONTINUED | OUTPATIENT
Start: 2024-02-02 | End: 2024-02-04 | Stop reason: HOSPADM

## 2024-02-02 RX ADMIN — SENNOSIDES 17.2 MG: 8.6 TABLET, FILM COATED ORAL at 12:30

## 2024-02-02 RX ADMIN — INSULIN LISPRO 4 UNITS: 100 INJECTION, SOLUTION INTRAVENOUS; SUBCUTANEOUS at 17:54

## 2024-02-02 RX ADMIN — SODIUM CHLORIDE SOLN NEBU 3% 4 ML: 3 NEBU SOLN at 15:58

## 2024-02-02 RX ADMIN — INSULIN LISPRO 2 UNITS: 100 INJECTION, SOLUTION INTRAVENOUS; SUBCUTANEOUS at 12:30

## 2024-02-02 RX ADMIN — BUDESONIDE 1 MG: 0.5 INHALANT RESPIRATORY (INHALATION) at 07:21

## 2024-02-02 RX ADMIN — TAMSULOSIN HYDROCHLORIDE 0.4 MG: 0.4 CAPSULE ORAL at 17:55

## 2024-02-02 RX ADMIN — CHLORHEXIDINE GLUCONATE 15 ML: 1.2 SOLUTION ORAL at 08:49

## 2024-02-02 RX ADMIN — SODIUM CHLORIDE SOLN NEBU 3% 4 ML: 3 NEBU SOLN at 20:04

## 2024-02-02 RX ADMIN — LEVALBUTEROL HYDROCHLORIDE 1.25 MG: 1.25 SOLUTION RESPIRATORY (INHALATION) at 12:59

## 2024-02-02 RX ADMIN — POTASSIUM CHLORIDE 20 MEQ: 1500 TABLET, EXTENDED RELEASE ORAL at 17:54

## 2024-02-02 RX ADMIN — IPRATROPIUM BROMIDE 0.5 MG: 0.5 SOLUTION RESPIRATORY (INHALATION) at 07:22

## 2024-02-02 RX ADMIN — LEVALBUTEROL HYDROCHLORIDE 1.25 MG: 1.25 SOLUTION RESPIRATORY (INHALATION) at 15:57

## 2024-02-02 RX ADMIN — HEPARIN SODIUM 5000 UNITS: 5000 INJECTION INTRAVENOUS; SUBCUTANEOUS at 21:46

## 2024-02-02 RX ADMIN — GUAIFENESIN 1200 MG: 600 TABLET, EXTENDED RELEASE ORAL at 06:24

## 2024-02-02 RX ADMIN — INSULIN LISPRO 1 UNITS: 100 INJECTION, SOLUTION INTRAVENOUS; SUBCUTANEOUS at 21:46

## 2024-02-02 RX ADMIN — LEVALBUTEROL HYDROCHLORIDE 1.25 MG: 1.25 SOLUTION RESPIRATORY (INHALATION) at 07:21

## 2024-02-02 RX ADMIN — SODIUM CHLORIDE SOLN NEBU 3% 4 ML: 3 NEBU SOLN at 12:59

## 2024-02-02 RX ADMIN — DOCUSATE SODIUM 100 MG: 100 CAPSULE, LIQUID FILLED ORAL at 17:53

## 2024-02-02 RX ADMIN — MELATONIN 6 MG: at 20:08

## 2024-02-02 RX ADMIN — DOCUSATE SODIUM 100 MG: 100 CAPSULE, LIQUID FILLED ORAL at 12:30

## 2024-02-02 RX ADMIN — INSULIN LISPRO 4 UNITS: 100 INJECTION, SOLUTION INTRAVENOUS; SUBCUTANEOUS at 12:30

## 2024-02-02 RX ADMIN — BUDESONIDE 1 MG: 0.5 INHALANT RESPIRATORY (INHALATION) at 20:04

## 2024-02-02 RX ADMIN — ACETAZOLAMIDE 250 MG: 250 TABLET ORAL at 17:53

## 2024-02-02 RX ADMIN — INSULIN GLARGINE 12 UNITS: 100 INJECTION, SOLUTION SUBCUTANEOUS at 21:45

## 2024-02-02 RX ADMIN — HEPARIN SODIUM 5000 UNITS: 5000 INJECTION INTRAVENOUS; SUBCUTANEOUS at 14:49

## 2024-02-02 RX ADMIN — IPRATROPIUM BROMIDE 0.5 MG: 0.5 SOLUTION RESPIRATORY (INHALATION) at 20:04

## 2024-02-02 RX ADMIN — TORSEMIDE 40 MG: 20 TABLET ORAL at 08:49

## 2024-02-02 RX ADMIN — POTASSIUM CHLORIDE 20 MEQ: 1500 TABLET, EXTENDED RELEASE ORAL at 08:49

## 2024-02-02 RX ADMIN — IPRATROPIUM BROMIDE 0.5 MG: 0.5 SOLUTION RESPIRATORY (INHALATION) at 15:57

## 2024-02-02 RX ADMIN — INSULIN LISPRO 2 UNITS: 100 INJECTION, SOLUTION INTRAVENOUS; SUBCUTANEOUS at 17:54

## 2024-02-02 RX ADMIN — LEVALBUTEROL HYDROCHLORIDE 1.25 MG: 1.25 SOLUTION RESPIRATORY (INHALATION) at 20:04

## 2024-02-02 RX ADMIN — SODIUM CHLORIDE SOLN NEBU 3% 4 ML: 3 NEBU SOLN at 07:22

## 2024-02-02 RX ADMIN — CYANOCOBALAMIN TAB 500 MCG 1000 MCG: 500 TAB at 08:49

## 2024-02-02 RX ADMIN — INSULIN LISPRO 4 UNITS: 100 INJECTION, SOLUTION INTRAVENOUS; SUBCUTANEOUS at 08:49

## 2024-02-02 RX ADMIN — IPRATROPIUM BROMIDE 0.5 MG: 0.5 SOLUTION RESPIRATORY (INHALATION) at 12:59

## 2024-02-02 RX ADMIN — HEPARIN SODIUM 5000 UNITS: 5000 INJECTION INTRAVENOUS; SUBCUTANEOUS at 06:24

## 2024-02-02 RX ADMIN — METHYLPREDNISOLONE SODIUM SUCCINATE 40 MG: 40 INJECTION, POWDER, FOR SOLUTION INTRAMUSCULAR; INTRAVENOUS at 06:24

## 2024-02-02 RX ADMIN — GUAIFENESIN 1200 MG: 600 TABLET, EXTENDED RELEASE ORAL at 17:53

## 2024-02-02 RX ADMIN — METHYLPREDNISOLONE SODIUM SUCCINATE 40 MG: 40 INJECTION, POWDER, FOR SOLUTION INTRAMUSCULAR; INTRAVENOUS at 21:45

## 2024-02-02 RX ADMIN — PRAVASTATIN SODIUM 80 MG: 80 TABLET ORAL at 17:53

## 2024-02-02 NOTE — OCCUPATIONAL THERAPY NOTE
"  Occupational Therapy Progress Note     Patient Name: Natalio Hartmann Jr.  Today's Date: 2/2/2024  Problem List  Principal Problem:    Acute on chronic respiratory failure with hypoxia and hypercapnia (HCC)  Active Problems:    Obstructive sleep apnea    Goals of care, counseling/discussion    BPH with obstruction/lower urinary tract symptoms    Prostate cancer (HCC)    Pulmonary nodules/lesions, multiple    Chronic HFrEF (heart failure with reduced ejection fraction)     Chronic anemia    Chronic hyponatremia    Physical deconditioning    End-stage COPD with acute exacerbation (HCC)    Chronic hypercapnia/KEVIN    Pulmonary cachexia due to COPD (HCC)    Severe protein-calorie malnutrition (HCC)          02/02/24 1054   OT Last Visit   OT Visit Date 02/02/24   Note Type   Note Type Treatment for insurance authorization   Pain Assessment   Pain Assessment Tool 0-10   Pain Score No Pain   Restrictions/Precautions   Weight Bearing Precautions Per Order No   Other Precautions Chair Alarm;Multiple lines;O2;Fall Risk  (6L NC at rest, bumped up to 8L with functional mobility 2* desaturation w/ advisement from nursing)   Lifestyle   Autonomy pt reports being mostly indpeendent w self care, showers and toileting.needs assist w LB when respiratory status is 'bad\"   Reciprocal Relationships supportive family. wife works 5-6 hrs per day   Intrinsic Gratification likes fishing, family   ADL   Where Assessed Edge of bed   Grooming Assistance 3  Moderate Assistance   Grooming Deficit Wash/dry hands;Wash/dry face;Brushing hair   Grooming Comments increased fatigue, unable ot maintain arms above head   LB Dressing Assistance 3  Moderate Assistance   LB Dressing Deficit Don/doff R sock;Don/doff L sock   Bed Mobility   Supine to Sit 5  Supervision   Sit to Supine 5  Supervision   Transfers   Sit to Stand 4  Minimal assistance   Additional items Assist x 1;Increased time required   Stand to Sit 4  Minimal assistance   Additional items " "Assist x 1   Functional Mobility   Functional Mobility 4  Minimal assistance   Additional items Rolling walker   Subjective   Subjective \"I need to rest\"   Cognition   Overall Cognitive Status WFL   Arousal/Participation Cooperative   Attention Within functional limits   Orientation Level Oriented X4   Memory Decreased recall of precautions   Following Commands Follows one step commands without difficulty   Comments increased willingness to participate in therapy today   Activity Tolerance   Activity Tolerance Treatment limited secondary to medical complications (Comment)  (desaturations)   Medical Staff Made Aware PT Anabela 2* medical complexity   Assessment   Assessment Patient participated in Skilled OT session this date with interventions consisting of ADL re training with the use of correct body mechnaics, Energy Conservation techniques, and safety awareness and fall prevention techniques . Patient agreeable to OT treatment session, upon arrival patient was found supine in bed.  In comparison to previous session, patient with improvements in willingness to participate* . Patient requiring frequent rest periods and ocassional safety reminders. Patient continues to be functioning below baseline level, occupational performance remains limited secondary to factors listed above and increased risk for falls and injury.   From OT standpoint, recommendation at time of d/c would be Home OT.   Patient to benefit from continued Occupational Therapy treatment while in the hospital to address deficits as defined above and maximize level of functional independence with ADLs and functional mobility.   Plan   Treatment Interventions ADL retraining;Functional transfer training;Endurance training;Patient/family training;Compensatory technique education   Goal Expiration Date 02/14/24   OT Treatment Day 1   OT Frequency 2-3x/wk   Discharge Recommendation   Rehab Resource Intensity Level, OT III (Minimum Resource Intensity) "   AM-PAC Daily Activity Inpatient   Lower Body Dressing 2   Bathing 2   Toileting 2   Upper Body Dressing 2   Grooming 2   Eating 4   Daily Activity Raw Score 14   Daily Activity Standardized Score (Calc for Raw Score >=11) 33.39         Stephanie Kaba MS, OTR/L

## 2024-02-02 NOTE — CASE MANAGEMENT
Case Management Discharge Planning Note    Patient name Natalio Hartmann Jr.  Location [facility]  MRN [id number]  : 1955 Date 2024       Current Admission Date: 2024  Current Admission Diagnosis:Acute on chronic respiratory failure with hypoxia and hypercapnia (HCC)   Patient Active Problem List    Diagnosis Date Noted    Dependence on respirator (ventilator) status ([facility]) 01/10/2024    Severe protein-calorie malnutrition (HCC) 10/14/2023    Elevated troponin 2023    History of bladder cancer 2023    Pulmonary cachexia due to COPD ([facility])     Chronic hypercapnia/KEVIN 2023    Metabolic encephalopathy 2023    End-stage COPD with acute exacerbation ([facility]) 2023    Palliative care patient 2023    Alkalosis 2023    Malnutrition ([facility]) 06/10/2023    Hyperlipidemia 2023    Steroid-induced hyperglycemia 2023    Physical deconditioning 2023    Chronic anemia 2023    SIRS (systemic inflammatory response syndrome) 2023    Chronic hyponatremia 2023    Chronic HFrEF (heart failure with reduced ejection fraction)  2022    Pulmonary nodules/lesions, multiple 2022    Prostate cancer ([facility]) 2021    BPH with obstruction/lower urinary tract symptoms 2021    Goals of care, counseling/discussion 2021    Acute on chronic respiratory failure with hypoxia and hypercapnia ([facility]) 2020    Macrocytosis without anemia 2019    Other insomnia 2019    GERD (gastroesophageal reflux disease) 2017    Nonobstructive atherosclerosis of coronary artery 2016    Mood disorder ([facility]) 2015    Prediabetes 2015    Osteoporosis 10/14/2014    Vitamin D deficiency 10/14/2014    Nonischemic cardiomyopathy ([facility]) 2014    Left bundle-branch block 2013    Osteoarthritis 2013    Obstructive sleep apnea 2013      LOS (days): 6  Geometric Mean LOS (GMLOS) (days): 4.1  Days to GMLOS:-2      OBJECTIVE:  Risk of Unplanned Readmission Score: 44.25         Current admission status: Inpatient   Preferred Pharmacy:   [id number] - [place], [place] - [address]  [address]  [place] [address]  Phone: [phone] Fax: [phone]    Primary Care Provider: Fatmata Gustafson DO    Primary Insurance: MEDICARE  Secondary Insurance: BLUE CROSS    DISCHARGE DETAILS:          IMM Given (Date):: 02/02/24        Additional Comments: CM explained IMM to patient. Patient requested that wife sign on his behalf.

## 2024-02-02 NOTE — CONSULTS
Consultation - Palliative & Supportive Care   Natalio Hartmann Jr.  68 y.o.  male  [facility]   MRN: [id number]  Encounter: [id number]    ASSESSMENT:    Patient Active Problem List   Diagnosis    Nonobstructive atherosclerosis of coronary artery    Nonischemic cardiomyopathy (HCC)    Left bundle-branch block    Obstructive sleep apnea    GERD (gastroesophageal reflux disease)    Prediabetes    Osteoarthritis    Osteoporosis    Vitamin D deficiency    Mood disorder (HCC)    Other insomnia    Macrocytosis without anemia    Acute on chronic respiratory failure with hypoxia and hypercapnia (HCC)    Goals of care, counseling/discussion    BPH with obstruction/lower urinary tract symptoms    Prostate cancer (HCC)    Pulmonary nodules/lesions, multiple    Chronic HFrEF (heart failure with reduced ejection fraction)     Chronic anemia    SIRS (systemic inflammatory response syndrome)    Chronic hyponatremia    Physical deconditioning    Steroid-induced hyperglycemia    Hyperlipidemia    Malnutrition (HCC)    Alkalosis    Palliative care patient    End-stage COPD with acute exacerbation (HCC)    Chronic hypercapnia/KEVIN    Metabolic encephalopathy    Pulmonary cachexia due to COPD (HCC)    Elevated troponin    History of bladder cancer    Severe protein-calorie malnutrition (HCC)    Dependence on respirator (ventilator) status (HCC)     Active issues specifically addressed today include:   goals of care  end-stage COPD  chronic KEVIN  chronic hypercapnia  chronic HFrEF  acute on chronic hypercarbic respiratory failure    PLAN:    1. Goals - full code, full cares, no limits set in advance.   - Pt previously expressed wish for wife and son to review together de-escalation of cares if ever the pt was intubated.   - pt is dismissive of our team, and does not seem to wish to engage.    2. Symptom management:  - Opioids not safe with current level of hypercarbia  - Pt denies symptoms to me, aside from homesickness, and poor meal  "completion   = Meal completions likely limited by end-stage COPD.  Pulmonary cachexia is common symptom of end-stage and advanced COPD.  Opioids can reduce air hunger and improve meal completion, but are not safe for this patient, as noted above.    3 F/up - Pt may follow in our clinic at his discretion.  We will not return to his care inpatient unless specifically requested.  Please continue with full medical cares, without opioids.  If pt changes code status / goals of care spontaneously, we will reconsider opioid therapy.    Code status: Level 1 - Full Code   Decisional apparatus:  Patient does have capacity to make medical decisions on my exam today. If such capacity is lost, patient's substitute decision maker would default to spouse by [place] Act 169.   Advance Directive / Living Will / POLST:  none on file    I have reviewed the patient's controlled substance dispensing history in the Prescription Drug Monitoring Program in compliance with the [facility] regulations before prescribing any controlled substances.    IDENTIFICATION:  Inpatient consult to Palliative Care  Consult performed by: Nguyễn Ramon MD  Consult ordered by: Bubba Velasquez MD          HISTORY OF PRESENT ILLNESS:    Natalio Hartmann Jr. is a 68 y.o. male with chronic HFrEF (EF 20%), non-ischemic cardiomyopathy, end-stage COPD, chronic respiratory failure w/ hypoxia and hypercapnia on supplemental O2. Pt has been seen by our group in the past; from last visit in hospital by our partner Dr. Moise, 7/23/23:      \"Patient admitted yesterday after being discharged 7/21/23; admitted for acute on chronic respiratory failure requiring BIPAP. This is his third admission since 6/1/23, fifth admission in six months. Thankfully at time of visit today, he improved to the point he was breathing comfortably on 6LPM via NC.  PSC consulted for goals of care. Our service has engaged with this patient and his wife on multiple occasions in recentmonths, and family has " "asked that discussions regarding code status and goals be limited (see Dr Berg's note from 7/20/23). They have been counseled re: patient's poor prognosis and ongoing physical decline. Patient states today he is aware that his prognosis is poor. He confirms today that he does not wish to consider hospice and comfort cares. His code status was changed to LOC3 last week, after discussions with the primary team - he makes the decision today to change to LOC2, no CPR but accepts intubation and ventilation. He states he would want his wife to make any future decision about withdrawal of care once intubated (which causes his wife some visible emotional distress).\"      This stay, pt has been admitted now for a week for COPD exacerbation, but he appears -- with careful management -- to be improving slowly towards discharge.  We are consulted to address the symptom of air hunger.      Today at bedside, pt is woken from a restful sleep, and he rather forecefully closes his eyes again after I introduce myself as a palliative care consultant.  \"I met you all, I know the drill'.  I attempted to sit patiently and offer compassionate listening, and asked permission to mute his TV so that we could hear each other.  \"Why not? Everyone else does.\"    With television muted, pt would not make eye contact, and gave rather monosyllabic answers to queries.  His only symptoms are poor meal completion and homesickness.  He feels trapped and tethered by nurses and monitors in the hospital.  As far as his poor meal completion, he dismisses this problem: \"I was never a big eater.  I try to eat everything.\"        Pt was informed that air hunger is a common side effect of late-stage COPD, but opioids were not offered, as they are not safe in his case, as we have noted above.  No other treatments are effective in reducing air hunger without reducing consciousness, so no other meds for this symptom of pulmonary cachexia were offered.      " "Pt endorsed no desire to continue conversation, despite my multiple offers to engage him on anythign that we should discuss, or any way I could help him.  \"I just hope I go home tomorrow'.    Review of Systems   Unable to perform ROS: Severe respiratory distress (grunting after short sentences to regain breath)       Past Medical History:   Diagnosis Date    Acute metabolic encephalopathy 03/29/2022    Arthritis     Bladder mass     Cardiomyopathy (HCC)     Chest pain     COPD (chronic obstructive pulmonary disease) (HCC)     COVID-19 virus infection 02/23/2023    CPAP (continuous positive airway pressure) dependence     Emphysema of lung (HCC)     Hypoxia     nocturnal    Left bundle branch block     Multiple pulmonary nodules     last assessed: 10/12/16    Pneumonia     Sleep apnea, obstructive     Smoker     Weight loss 08/29/2019     Past Surgical History:   Procedure Laterality Date    COLONOSCOPY      CYSTOSCOPY      CYSTOSCOPY  02/17/2021    NM BLADDER INSTILLATION ANTICARCINOGENIC AGENT N/A 1/3/2020    Procedure: INSTILLATION MITOMYCIN;  Surgeon: Sundeep Canchola MD;  Location: [facility];  Service: Urology    NM CYSTO W/REMOVAL OF LESIONS SMALL N/A 1/3/2020    Procedure: TRANSURETHRAL RESECTION OF BLADDER TUMOR (TURBT);  Surgeon: Sundeep Canchola MD;  Location[facility];  Service: Urology    NM CYSTOURETHROSCOPY WITH BIOPSY N/A 4/13/2021    Procedure: CYSTOSCOPY WITH BIOPSIES;  Surgeon: Sundeep Canchola MD;  [facility];  Service: Urology    NM TRURL ELECTROSURG RESCJ PROSTATE BLEED COMPLETE N/A 4/13/2021    Procedure: TRANSURETHRAL RESECTION OF PROSTATE (TURP) BLADDER BIOPSY, FULGURATION;  Surgeon: Sundeep Canchola MD;  [facility];  Service: Urology     Social History     Socioeconomic History    Marital status: /Civil Union     Spouse name: Not on file    Number of children: Not on file    Years of education: Not on file    Highest education level: Not on file "   Occupational History    Not on file   Tobacco Use    Smoking status: Former     Current packs/day: 0.00     Average packs/day: 2.5 packs/day for 42.0 years (105.0 ttl pk-yrs)     Types: Cigarettes     Start date:      Quit date: 2012     Years since quittin.0    Smokeless tobacco: Former    Tobacco comments:     1 ppd for 37 years, 2010 down to 5 cigs a day, is around second hand smoke   Vaping Use    Vaping status: Never Used   Substance and Sexual Activity    Alcohol use: Not Currently     Comment: rarely    Drug use: No    Sexual activity: Not Currently     Partners: Female   Other Topics Concern    Not on file   Social History Narrative    Daily coffee consumption: 8 or more cups a day        Used to work in [employer].  On disability.     Social Determinants of Health     Financial Resource Strain: Not on file   Food Insecurity: No Food Insecurity (2024)    Hunger Vital Sign     Worried About Running Out of Food in the Last Year: Never true     Ran Out of Food in the Last Year: Never true   Transportation Needs: No Transportation Needs (2024)    PRAPARE - Transportation     Lack of Transportation (Medical): No     Lack of Transportation (Non-Medical): No   Physical Activity: Not on file   Stress: Not on file   Social Connections: Not on file   Intimate Partner Violence: Not on file   Housing Stability: Low Risk  (2024)    Housing Stability Vital Sign     Unable to Pay for Housing in the Last Year: No     Number of Places Lived in the Last Year: 1     Unstable Housing in the Last Year: No     Family History   Problem Relation Age of Onset    Diabetes Mother        MEDICATIONS / ALLERGIES:  all current active meds have been reviewed and current meds:   Current Facility-Administered Medications   Medication Dose Route Frequency    budesonide (PULMICORT) inhalation solution 1 mg  1 mg Nebulization Q12H    chlorhexidine (PERIDEX) 0.12 % oral rinse 15 mL  15 mL Mouth/Throat Q12H GABE     cyanocobalamin (VITAMIN B-12) tablet 1,000 mcg  1,000 mcg Oral Daily    ergocalciferol (VITAMIN D2) capsule 50,000 Units  50,000 Units Oral Weekly    guaiFENesin (MUCINEX) 12 hr tablet 1,200 mg  1,200 mg Oral Q12H [facility]    heparin (porcine) subcutaneous injection 5,000 Units  5,000 Units Subcutaneous Q8H [facility]    insulin glargine (LANTUS) subcutaneous injection 12 Units 0.12 mL  12 Units Subcutaneous HS    insulin lispro (HumaLOG) 100 units/mL subcutaneous injection 1-5 Units  1-5 Units Subcutaneous HS    insulin lispro (HumaLOG) 100 units/mL subcutaneous injection 1-5 Units  1-5 Units Subcutaneous TID AC    insulin lispro (HumaLOG) 100 units/mL subcutaneous injection 4 Units  4 Units Subcutaneous TID With Meals    ipratropium (ATROVENT) 0.02 % inhalation solution 0.5 mg  0.5 mg Nebulization 4x Daily    levalbuterol (XOPENEX) inhalation solution 1.25 mg  1.25 mg Nebulization 4x Daily    melatonin tablet 6 mg  6 mg Oral HS    methylPREDNISolone sodium succinate (Solu-MEDROL) injection 40 mg  40 mg Intravenous Q8H [facility]    potassium chloride (Klor-Con M20) CR tablet 20 mEq  20 mEq Oral Daily    pravastatin (PRAVACHOL) tablet 80 mg  80 mg Oral Daily With Dinner    senna (SENOKOT) tablet 8.6 mg  1 tablet Oral HS    sodium chloride (OCEAN) 0.65 % nasal spray 1 spray  1 spray Each Nare Q1H PRN    sodium chloride 3 % inhalation solution 4 mL  4 mL Nebulization 4x Daily    tamsulosin (FLOMAX) capsule 0.4 mg  0.4 mg Oral Daily With Dinner    torsemide (DEMADEX) tablet 40 mg  40 mg Oral Daily       No Known Allergies    OBJECTIVE:  /69   Pulse 92   Temp 98.5 °F (36.9 °C) (Oral)   Resp (!) 23   Wt 44.2 kg (97 lb 7.1 oz)   SpO2 98%   BMI 17.82 kg/m²     Physical Exam  Constitutional:       General: He is not in acute distress.     Appearance: He is ill-appearing. He is not toxic-appearing or diaphoretic.      Comments: Frail   HENT:      Head: Normocephalic and atraumatic.      Right Ear: External ear normal.      Left  Ear: External ear normal.   Eyes:      General:         Right eye: No discharge.         Left eye: No discharge.      Conjunctiva/sclera: Conjunctivae normal.      Pupils: Pupils are equal, round, and reactive to light.   Neck:      Trachea: No tracheal deviation.   Cardiovascular:      Rate and Rhythm: Regular rhythm. Tachycardia present.   Pulmonary:      Effort: Respiratory distress present.      Breath sounds: No stridor.      Comments: Poor air movement throughout.  Grunting on most expirations.  Tachy.  Abdominal:      General: There is no distension.      Palpations: Abdomen is soft.      Comments: scaphoid   Musculoskeletal:      Comments: Marked cachexia, with prominent ribs and knees and knobby knuckles, and generalized wasting of all muscle groups.   Skin:     General: Skin is warm and dry.      Coloration: Skin is not pale.      Findings: No erythema or rash.   Neurological:      General: No focal deficit present.      Mental Status: He is alert and oriented to person, place, and time. Mental status is at baseline.      Cranial Nerves: No cranial nerve deficit.   Psychiatric:         Mood and Affect: Mood normal.         Behavior: Behavior normal.         Thought Content: Thought content normal.         Judgment: Judgment normal.       Lab Results: I have personally reviewed pertinent labs.    HEMATOLOGY PROFILE:  Results from last 7 days   Lab Units 01/31/24 0440 01/30/24 0455 01/29/24 0449 01/28/24  0821 01/27/24  0647   WBC Thousand/uL 4.58 5.63 8.16 12.89* 16.96*   HEMOGLOBIN g/dL 9.7* 9.5* 9.3* 8.3* 10.7*   HEMATOCRIT % 31.6* 32.5* 31.0* 27.4* 35.2*   PLATELETS Thousands/uL 228 220 220 189 270   NEUTROS PCT % 87* 88*  --   --  86*   MONOS PCT % 7 7  --   --  9   MONO PCT %  --   --   --  2*  --    EOS PCT % 0 0  --  0 0       CHEMISTRY PROFILE:  Results from last 7 days   Lab Units 02/01/24  0507 01/31/24  0440 01/30/24 0455 01/29/24 0449 01/28/24  0421   SODIUM mmol/L 136 139 139 135 133*    POTASSIUM mmol/L 3.9 3.8 3.7 4.5 3.9   CHLORIDE mmol/L 83* 85* 87* 90* 90*   CO2 mmol/L >45* >45* >45* 38* 38*   BUN mg/dL 37* 36* 37* 34* 32*   CREATININE mg/dL 0.93 0.85 0.98 0.77 0.80   ALBUMIN g/dL  --  3.5  --  3.7 3.4*   CALCIUM mg/dL 8.7 8.8 9.1 9.3 8.7   ALK PHOS U/L  --  60  --  61 56   ALT U/L  --  13  --  19 7   AST U/L  --  14  --  30 14       Albumin:  0   Lab Value Date/Time    ALB 3.5 01/31/2024 0440    ALB 3.9 08/17/2015 0756   GFR acceptable for any opioid.  Given MARKED elevation in measured CO2, would AVOID ALL OPIOIDS at this time.    Imaging Studies: reviewed reports  EKG, Pathology, and Other Studies: reviewed reports    Counseling / Coordination of Care:  Total floor / unit time spent today 40+ minutes. Greater than 50% of total time was spent with the patient and / or family counseling and / or coordination of care. A description of the counseling / coordination of care: chart review; symptom pursuit; supportive listening; anticipatory guidance. review and assessment of decisional apparatus and capacity, applicable legal codes and extant documents;

## 2024-02-02 NOTE — PLAN OF CARE
Problem: PHYSICAL THERAPY ADULT  Goal: Performs mobility at highest level of function for planned discharge setting.  See evaluation for individualized goals.  Description: Treatment/Interventions: Functional transfer training, LE strengthening/ROM, Therapeutic exercise, Elevations, Endurance training, Patient/family training, Equipment eval/education, Bed mobility, Gait training, Spoke to nursing, Spoke to case management, OT  Equipment Recommended: Walker (pt owns)       See flowsheet documentation for full assessment, interventions and recommendations.  Outcome: Progressing  Note: Prognosis: Fair  Problem List: Decreased strength, Decreased endurance, Impaired balance, Decreased mobility  Assessment: Pt seen for PT treatment session this date. Therapy session focused on gait training and endurance training in order to improve overall mobility and independence. Pt requires min A for transfers and ambulation w/ RW. Ambulation limited by low endurance and low SPO2 reading and pt requires frequent, seated rest breaks and cueing for pursed lip breathing. Pt making good progress toward goals. Pt was left supine at the end of PT session with all needs in reach. Pt would benefit from continued PT services while in hospital to address remaining limitations. PT to continue treating pt and recommends home w/ HHPT. The patient's AM-PAC Basic Mobility Inpatient Short Form Raw Score is 20. A Raw score of greater than 16 suggests the patient may benefit from discharge to home. Please also refer to the recommendation of the Physical Therapist for safe discharge planning.        Rehab Resource Intensity Level, PT: III (Minimum Resource Intensity)    See flowsheet documentation for full assessment.

## 2024-02-02 NOTE — PLAN OF CARE
Problem: PAIN - ADULT  Goal: Verbalizes/displays adequate comfort level or baseline comfort level  Description: Interventions:  - Encourage patient to monitor pain and request assistance  - Assess pain using appropriate pain scale  - Administer analgesics based on type and severity of pain and evaluate response  - Implement non-pharmacological measures as appropriate and evaluate response  - Consider cultural and social influences on pain and pain management  - Notify physician/advanced practitioner if interventions unsuccessful or patient reports new pain  Outcome: Progressing     Problem: INFECTION - ADULT  Goal: Absence or prevention of progression during hospitalization  Description: INTERVENTIONS:  - Assess and monitor for signs and symptoms of infection  - Monitor lab/diagnostic results  - Monitor all insertion sites, i.e. indwelling lines, tubes, and drains  - Monitor endotracheal if appropriate and nasal secretions for changes in amount and color  - [place] appropriate cooling/warming therapies per order  - Administer medications as ordered  - Instruct and encourage patient and family to use good hand hygiene technique  - Identify and instruct in appropriate isolation precautions for identified infection/condition  Outcome: Progressing     Problem: Knowledge Deficit  Goal: Patient/family/caregiver demonstrates understanding of disease process, treatment plan, medications, and discharge instructions  Description: Complete learning assessment and assess knowledge base.  Interventions:  - Provide teaching at level of understanding  - Provide teaching via preferred learning methods  Outcome: Progressing     Problem: Nutrition/Hydration-ADULT  Goal: Nutrient/Hydration intake appropriate for improving, restoring or maintaining nutritional needs  Description: Monitor and assess patient's nutrition/hydration status for malnutrition. Collaborate with interdisciplinary team and initiate plan and interventions as  ordered.  Monitor patient's weight and dietary intake as ordered or per policy. Utilize nutrition screening tool and intervene as necessary. Determine patient's food preferences and provide high-protein, high-caloric foods as appropriate.     INTERVENTIONS:  - Monitor oral intake, urinary output, labs, and treatment plans  - Assess nutrition and hydration status and recommend course of action  - Evaluate amount of meals eaten  - Assist patient with eating if necessary   - Allow adequate time for meals  - Recommend/ encourage appropriate diets, oral nutritional supplements, and vitamin/mineral supplements  - Order, calculate, and assess calorie counts as needed  - Recommend, monitor, and adjust tube feedings and TPN/PPN based on assessed needs  - Assess need for intravenous fluids  - Provide specific nutrition/hydration education as appropriate  - Include patient/family/caregiver in decisions related to nutrition  Outcome: Progressing     Problem: RESPIRATORY - ADULT  Goal: Achieves optimal ventilation and oxygenation  Description: INTERVENTIONS:  - Assess for changes in respiratory status  - Assess for changes in mentation and behavior  - Position to facilitate oxygenation and minimize respiratory effort  - Oxygen administered by appropriate delivery if ordered  - Initiate smoking cessation education as indicated  - Encourage broncho-pulmonary hygiene including cough, deep breathe, Incentive Spirometry  - Assess the need for suctioning and aspirate as needed  - Assess and instruct to report SOB or any respiratory difficulty  - Respiratory Therapy support as indicated  Outcome: Progressing

## 2024-02-02 NOTE — PHYSICAL THERAPY NOTE
PHYSICAL THERAPY NOTE        Patient Name: Natalio Hartmann Jr.  Today's Date: 2/2/2024 02/02/24 1053   PT Last Visit   PT Visit Date 02/02/24   Note Type   Note Type Treatment   Pain Assessment   Pain Assessment Tool 0-10   Pain Score No Pain   Restrictions/Precautions   Weight Bearing Precautions Per Order No   Other Precautions Chair Alarm;Bed Alarm;Telemetry;Multiple lines;O2;Fall Risk  (6L O2 at rest)   General   Chart Reviewed Yes   Response to Previous Treatment Patient with no complaints from previous session.   Family/Caregiver Present Yes   Cognition   Overall Cognitive Status WFL   Arousal/Participation Alert;Cooperative   Attention Within functional limits   Orientation Level Oriented X4   Memory Decreased recall of precautions   Following Commands Follows one step commands without difficulty   Comments pt pleasant and cooperative t/o PT session   Bed Mobility   Supine to Sit 5  Supervision   Sit to Supine 5  Supervision   Additional Comments Upon arrival, pt supine in bed. pt returned to bed with call bell and all needs following PT session   Transfers   Sit to Stand 4  Minimal assistance   Additional items Assist x 1;Increased time required;Verbal cues   Stand to Sit 4  Minimal assistance   Additional items Assist x 1;Increased time required;Verbal cues   Additional Comments Transfers w/ RW   Ambulation/Elevation   Gait pattern Forward Flexion;Decreased foot clearance;Shuffling;Short stride;Step to;Excessively slow   Gait Assistance 4  Minimal assist   Additional items Assist x 1;Verbal cues   Assistive Device Rolling walker   Distance 40' x2   Ambulation/Elevation Additional Comments Pt initially on 6L O2. Following ambulation 40', SPO2 dropped to 85%. Pt took seated rest break, SPO2 slow to rise. O2 increased to 8L for recovery. SPO2 remained >90% on 8L   Balance   Static Sitting Fair +   Dynamic Sitting Fair   Static  Standing Fair -   Dynamic Standing Poor +   Ambulatory Poor +   Endurance Deficit   Endurance Deficit Yes   Endurance Deficit Description low SPO2, dec activity tolerance fatigue   Activity Tolerance   Activity Tolerance Patient limited by fatigue   Medical Staff Made Aware OT Ajay brown treat 2/2 low activity tolerance   Nurse Made Aware RN cleared and updated   Assessment   Prognosis Fair   Problem List Decreased strength;Decreased endurance;Impaired balance;Decreased mobility   Assessment Pt seen for PT treatment session this date. Therapy session focused on gait training and endurance training in order to improve overall mobility and independence. Pt requires min A for transfers and ambulation w/ RW. Ambulation limited by low endurance and low SPO2 reading and pt requires frequent, seated rest breaks and cueing for pursed lip breathing. Pt making good progress toward goals. Pt was left supine at the end of PT session with all needs in reach. Pt would benefit from continued PT services while in hospital to address remaining limitations. PT to continue treating pt and recommends home w/ HHPT. The patient's AM-PAC Basic Mobility Inpatient Short Form Raw Score is 20. A Raw score of greater than 16 suggests the patient may benefit from discharge to home. Please also refer to the recommendation of the Physical Therapist for safe discharge planning.   Goals   Patient Goals to get better   STG Expiration Date 02/14/24   PT Treatment Day 1   Plan   Treatment/Interventions Functional transfer training;LE strengthening/ROM;Therapeutic exercise;Endurance training;Patient/family training;Equipment eval/education;Bed mobility;Gait training;Spoke to case management;Spoke to nursing;OT   Progress Progressing toward goals   PT Frequency 2-3x/wk   Discharge Recommendation   Rehab Resource Intensity Level, PT III (Minimum Resource Intensity)   Equipment Recommended Walker   AM-PAC Basic Mobility Inpatient   Turning in Flat Bed  Without Bedrails 4   Lying on Back to Sitting on Edge of Flat Bed Without Bedrails 4   Moving Bed to Chair 3   Standing Up From Chair Using Arms 3   Walk in Room 3   Climb 3-5 Stairs With Railing 3   Basic Mobility Inpatient Raw Score 20   Basic Mobility Standardized Score 43.99   Highest Level Of Mobility   JH-HLM Goal 6: Walk 10 steps or more   JH-HLM Achieved 7: Walk 25 feet or more   Education   Education Provided Mobility training;Assistive device   Patient Demonstrates acceptance/verbal understanding   End of Consult   Patient Position at End of Consult Supine;All needs within reach     Anabela Orozco PT, DPT

## 2024-02-02 NOTE — PLAN OF CARE
Problem: OCCUPATIONAL THERAPY ADULT  Goal: Performs self-care activities at highest level of function for planned discharge setting.  See evaluation for individualized goals.  Outcome: Progressing  Note: Limitation: Decreased ADL status, Decreased endurance, Decreased self-care trans, Decreased high-level ADLs     Assessment: Patient participated in Skilled OT session this date with interventions consisting of ADL re training with the use of correct body mechnaics, Energy Conservation techniques, and safety awareness and fall prevention techniques . Patient agreeable to OT treatment session, upon arrival patient was found supine in bed.  In comparison to previous session, patient with improvements in willingness to participate* . Patient requiring frequent rest periods and ocassional safety reminders. Patient continues to be functioning below baseline level, occupational performance remains limited secondary to factors listed above and increased risk for falls and injury.   From OT standpoint, recommendation at time of d/c would be Home OT.   Patient to benefit from continued Occupational Therapy treatment while in the hospital to address deficits as defined above and maximize level of functional independence with ADLs and functional mobility.     Rehab Resource Intensity Level, OT: III (Minimum Resource Intensity)

## 2024-02-02 NOTE — PLAN OF CARE
Problem: Potential for Falls  Goal: Patient will remain free of falls  Description: INTERVENTIONS:  - Educate patient/family on patient safety including physical limitations  - Instruct patient to call for assistance with activity   - Consult OT/PT to assist with strengthening/mobility   - Keep Call bell within reach  - Keep bed low and locked with side rails adjusted as appropriate  - Keep care items and personal belongings within reach  - Initiate and maintain comfort rounds  - Make Fall Risk Sign visible to staff  - Offer Toileting every 2 Hours, in advance of need  - Initiate/Maintain  alarm  - Obtain necessary fall risk management equipment:    Problem: PAIN - ADULT  Goal: Verbalizes/displays adequate comfort level or baseline comfort level  Description: Interventions:  - Encourage patient to monitor pain and request assistance  - Assess pain using appropriate pain scale  - Administer analgesics based on type and severity of pain and evaluate response  - Implement non-pharmacological measures as appropriate and evaluate response  - Consider cultural and social influences on pain and pain management  - Notify physician/advanced practitioner if interventions unsuccessful or patient reports new pain  Outcome: Progressing     Problem: INFECTION - ADULT  Goal: Absence or prevention of progression during hospitalization  Description: INTERVENTIONS:  - Assess and monitor for signs and symptoms of infection  - Monitor lab/diagnostic results  - Monitor all insertion sites, i.e. indwelling lines, tubes, and drains  - Monitor endotracheal if appropriate and nasal secretions for changes in amount and color  - [place] appropriate cooling/warming therapies per order  - Administer medications as ordered  - Instruct and encourage patient and family to use good hand hygiene technique  - Identify and instruct in appropriate isolation precautions for identified infection/condition  Outcome: Progressing     Problem: INFECTION  - ADULT  Goal: Absence of fever/infection during neutropenic period  Description: INTERVENTIONS:  - Monitor WBC    Outcome: Progressing     Problem: SAFETY ADULT  Goal: Patient will remain free of falls  Description: INTERVENTIONS:  - Educate patient/family on patient safety including physical limitations  - Instruct patient to call for assistance with activity   - Consult OT/PT to assist with strengthening/mobility   - Keep Call bell within reach  - Keep bed low and locked with side rails adjusted as appropriate  - Keep care items and personal belongings within reach  - Initiate and maintain comfort rounds  - Make Fall Risk Sign visible to staff  - Offer Toileting every 2 Hours, in advance of need  - Initiate/Maintain  alarm  - Obtain necessary fall risk management equipment:    Problem: INFECTION - ADULT  Goal: Absence or prevention of progression during hospitalization  Description: INTERVENTIONS:  - Assess and monitor for signs and symptoms of infection  - Monitor lab/diagnostic results  - Monitor all insertion sites, i.e. indwelling lines, tubes, and drains  - Monitor endotracheal if appropriate and nasal secretions for changes in amount and color  - [place] appropriate cooling/warming therapies per order  - Administer medications as ordered  - Instruct and encourage patient and family to use good hand hygiene technique  - Identify and instruct in appropriate isolation precautions for identified infection/condition  Outcome: Progressing     Problem: INFECTION - ADULT  Goal: Absence of fever/infection during neutropenic period  Description: INTERVENTIONS:  - Monitor WBC    Outcome: Progressing     Problem: Knowledge Deficit  Goal: Patient/family/caregiver demonstrates understanding of disease process, treatment plan, medications, and discharge instructions  Description: Complete learning assessment and assess knowledge base.  Interventions:  - Provide teaching at level of understanding  - Provide teaching via  preferred learning methods  Outcome: Progressing     Problem: DISCHARGE PLANNING  Goal: Discharge to home or other facility with appropriate resources  Description: INTERVENTIONS:  - Identify barriers to discharge w/patient and caregiver  - Arrange for needed discharge resources and transportation as appropriate  - Identify discharge learning needs (meds, wound care, etc.)  - Arrange for interpretive services to assist at discharge as needed  - Refer to Case Management Department for coordinating discharge planning if the patient needs post-hospital services based on physician/advanced practitioner order or complex needs related to functional status, cognitive ability, or social support system  Outcome: Progressing     - Apply yellow socks and bracelet for high fall risk patients  - Consider moving patient to room near nurses station  Outcome: Progressing     - Apply yellow socks and bracelet for high fall risk patients  - Consider moving patient to room near nurses station  Outcome: Progressing

## 2024-02-02 NOTE — PROGRESS NOTES
Progress Note - Pulmonary   Natalio Hartmann Jr. 68 y.o. male MRN: [id number]  [facility]  Encounter: [id number]    Assessment:  Acute on chronic hypoxic and hypercapnic respiratory failure   - 3 to 6L home oxygen  Very severe COPD  - FEV1 22% predicted; on chronic Prednisone  HFpEF  Pulmonary cachexia  CAD  Chronic hyponatremia  Hx of prostate CA,   BPH  Chronic anemia  Type II DM  KEVIN  - nocturnal Trilogy  Nicotine dependence, cigarettes, in remission    Plan:  - Continue AVAPS qHs and PRN   - Will decreased methylpred to 40mg IV q8h to q12h today  - continue Pulmicort q12h, holding home formoterol   - continue Xopenex/Atrovent QID   - Continue airway clearance with flutter valve, guaifenesin, and 3% saline nebs for further mucus clearance as tolerates   - Wean supplemental O2 to goal SpO2 >88%  -  torsemide from 40mg daily to 20mg daily. Can give Diamox 250mg once       Subjective:   Patient seen and examined. Reports breathing is feeling better today, however, states that it comes in waves. He just wants to go outside. Labs continue to show elevated bicarb. VBG this morning 7.43/83.5/51.4/54.4.     Objective:     Vitals: Blood pressure 105/67, pulse 94, temperature 97.8 °F (36.6 °C), temperature source Oral, resp. rate (!) 23, weight 45 kg (99 lb 1.6 oz), SpO2 98%.,Body mass index is 18.13 kg/m².      Intake/Output Summary (Last 24 hours) at 2024 0754  Last data filed at 2024 0601  Gross per 24 hour   Intake 850 ml   Output 2150 ml   Net -1300 ml       Invasive Devices       Peripheral Intravenous Line  Duration             Peripheral IV 24 Proximal;Right;Ventral (anterior) Forearm 2 days              Drain  Duration             External Urinary Catheter Small 3 days                    Physical Exam:   General: No acute distress  HENT: Normocephalic, atraumatic.  PERRL, EOMI, Moist mucous membranes.  Neck: Supple  Lungs: Distant breath sounds bilaterally with diffuse expiratory  wheezing greater in bilateral upper lung fields, on 6L midflow  Heart: Regular rate and rhythm, S1-S2 present, no murmurs rubs or gallops  Abdomen: Soft, nontender, nondistended  Extremities: Warm and well perfused, no cyanosis, clubbing, or edema  Skin: Warm, dry, no rashes or lesions  Neurologic: No focal neurologic deficits     Labs: I have personally reviewed pertinent lab results.  Imaging and other studies: I have personally reviewed pertinent films in PACS

## 2024-02-02 NOTE — ASSESSMENT & PLAN NOTE
Patient is chronically on home oxygen between 4 to 6 L/min  Presented with severe hypoxia and shortness of breath  Placed on BiPAP in ED  Chest x-ray with mild pulmonary edema. Focal opacity in the right midlung which could represent pneumonia.     Patient is afebrile with leukocytosis  Patient currently on high flow nasal cannula this morning, weaned down to midlfow today  Continue to wean down oxygen as tolerated  Patient is full code  Likely secondary to end-stage COPD exacerbation   Pt with significant hypochloremic metabolic alkalosis, will add PO Kcl, diamox 250mg daily and decrease torsemide to 20mg daily

## 2024-02-02 NOTE — ASSESSMENT & PLAN NOTE
Wt Readings from Last 3 Encounters:   02/02/24 44.2 kg (97 lb 7.1 oz)   01/22/24 46.7 kg (103 lb)   01/10/24 43.5 kg (96 lb)     Previous cardiac echo on 5/1/2023 with EF 20%  Reviewing outpatient cardiology note this is secondary to dilated cardiomyopathy and left bundle branch block  Will decrease torsemide to 20mg daily due to contraction alkalosis

## 2024-02-02 NOTE — ASSESSMENT & PLAN NOTE
Malnutrition Findings:                                 BMI Findings:           Body mass index is 17.82 kg/m².

## 2024-02-02 NOTE — PROGRESS NOTES
[facility]  Progress Note  Name: Natalio Richardson I  MRN: [id number]  Unit/Bed#: ICCU 202-01 I Date of Admission: 1/27/2024   Date of Service: 2/2/2024 I Hospital Day: 6    Assessment/Plan   * Acute on chronic respiratory failure with hypoxia and hypercapnia (HCC)  Assessment & Plan  Patient is chronically on home oxygen between 4 to 6 L/min  Presented with severe hypoxia and shortness of breath  Placed on BiPAP in ED  Chest x-ray with mild pulmonary edema. Focal opacity in the right midlung which could represent pneumonia.     Patient is afebrile with leukocytosis  Patient currently on high flow nasal cannula this morning, weaned down to midlfow today  Continue to wean down oxygen as tolerated  Patient is full code  Likely secondary to end-stage COPD exacerbation   Pt with significant hypochloremic metabolic alkalosis, will add PO Kcl, diamox 250mg daily and decrease torsemide to 20mg daily    End-stage COPD with acute exacerbation (HCC)  Assessment & Plan  Patient with end-stage COPD on chronic hypoxic respiratory failure on home oxygen, chronically on prednisone prednisone 5 mg daily, budesonide/Perforomist twice daily, DuoNebs 4 times daily.   Follow-up with pulmonology as an outpatient  Presented with COPD exacerbation, CO2 retention and respiratory acidosis  Continue prednisone taper per pulm recommendation  Bipap at night and when napping    Severe protein-calorie malnutrition (HCC)  Assessment & Plan  Malnutrition Findings:                                 BMI Findings:           Body mass index is 17.82 kg/m².       Pulmonary cachexia due to COPD (HCC)  Assessment & Plan  Malnutrition Findings:                                 BMI Findings:           Body mass index is 17.82 kg/m².       Chronic hypercapnia/KEVIN  Assessment & Plan  Patient on home BiPAP at bedtime    Physical deconditioning  Assessment & Plan  Continue pt ot    Chronic hyponatremia  Assessment &  Plan  Continue to monitor off salt tabs    Chronic anemia  Assessment & Plan  Patient with macrocytic anemia, continue on B12 supplementation    Chronic HFrEF (heart failure with reduced ejection fraction)   Assessment & Plan  Wt Readings from Last 3 Encounters:   02/02/24 44.2 kg (97 lb 7.1 oz)   01/22/24 46.7 kg (103 lb)   01/10/24 43.5 kg (96 lb)     Previous cardiac echo on 5/1/2023 with EF 20%  Reviewing outpatient cardiology note this is secondary to dilated cardiomyopathy and left bundle branch block  Will decrease torsemide to 20mg daily due to contraction alkalosis          Pulmonary nodules/lesions, multiple  Assessment & Plan  Being followed by pulmonary as an outpatient    Prostate cancer (HCC)  Assessment & Plan  Status post TURP    BPH with obstruction/lower urinary tract symptoms  Assessment & Plan  Continue on tamsulosin, patient with history of BPH status post TURP, monitor blood pressure    Goals of care, counseling/discussion  Assessment & Plan  Patient is a level 1 full code    Obstructive sleep apnea  Assessment & Plan  Patient with history of KEVIN, uses trilogy vent at bedtime with supplemental oxygen of 4 L               VTE Pharmacologic Prophylaxis: VTE Score: 3 Moderate Risk (Score 3-4) - Pharmacological DVT Prophylaxis Ordered: heparin.    Mobility:   Basic Mobility Inpatient Raw Score: 20  JH-HLM Goal: 6: Walk 10 steps or more  JH-HLM Achieved: 7: Walk 25 feet or more  HLM Goal achieved. Continue to encourage appropriate mobility.    Patient Centered Rounds: I performed bedside rounds with nursing staff today.   Discussions with Specialists or Other Care Team Provider: nurse, CM    Education and Discussions with Family / Patient: Updated  (wife) via phone.    Total Time Spent on Date of Encounter in care of patient: 40 mins. This time was spent on one or more of the following: performing physical exam; counseling and coordination of care; obtaining or reviewing history;  documenting in the medical record; reviewing/ordering tests, medications or procedures; communicating with other healthcare professionals and discussing with patient's family/caregivers.    Current Length of Stay: 6 day(s)  Current Patient Status: Inpatient   Certification Statement: The patient will continue to require additional inpatient hospital stay due to metabolic alkalosis  Discharge Plan: Anticipate discharge in 48-72 hrs to home with home services.    Code Status: Level 1 - Full Code    Subjective:   Denies any new complaints.     Objective:     Vitals:   Temp (24hrs), Av.1 °F (36.7 °C), Min:97.5 °F (36.4 °C), Max:98.5 °F (36.9 °C)    Temp:  [97.5 °F (36.4 °C)-98.5 °F (36.9 °C)] 98.5 °F (36.9 °C)  HR:  [] 92  Resp:  [21-23] 23  BP: ()/(63-73) 101/69  SpO2:  [94 %-100 %] 98 %  Body mass index is 17.82 kg/m².     Input and Output Summary (last 24 hours):     Intake/Output Summary (Last 24 hours) at 2024 1508  Last data filed at 2024 1307  Gross per 24 hour   Intake 458 ml   Output 1250 ml   Net -792 ml       Physical Exam:   Physical Exam  Constitutional:       Comments: cachectic   HENT:      Head: Normocephalic and atraumatic.      Nose: Nose normal.   Eyes:      Extraocular Movements: Extraocular movements intact.   Cardiovascular:      Rate and Rhythm: Normal rate and regular rhythm.   Pulmonary:      Comments: Distant breath sounds  Musculoskeletal:      Right lower leg: No edema.      Left lower leg: No edema.   Skin:     General: Skin is warm and dry.   Neurological:      Mental Status: He is alert and oriented to person, place, and time.   Psychiatric:         Mood and Affect: Mood normal.         Behavior: Behavior normal.          Additional Data:     Labs:  Results from last 7 days   Lab Units 24  0440 24  0449 24  0821   WBC Thousand/uL 4.58   < > 12.89*   HEMOGLOBIN g/dL 9.7*   < > 8.3*   HEMATOCRIT % 31.6*   < > 27.4*   PLATELETS Thousands/uL 228   < >  189   BANDS PCT %  --   --  1   NEUTROS PCT % 87*   < >  --    LYMPHS PCT % 5*   < >  --    LYMPHO PCT %  --   --  2*   MONOS PCT % 7   < >  --    MONO PCT %  --   --  2*   EOS PCT % 0   < > 0    < > = values in this interval not displayed.     Results from last 7 days   Lab Units 02/02/24  0632 02/01/24  0507 01/31/24  0440 01/30/24  0455 01/29/24  0449   SODIUM mmol/L 138   < > 139   < > 135   POTASSIUM mmol/L 3.9   < > 3.8   < > 4.5   CHLORIDE mmol/L 83*   < > 85*   < > 90*   CO2 mmol/L >45*   < > >45*   < > 38*   BUN mg/dL 34*   < > 36*   < > 34*   CREATININE mg/dL 0.76   < > 0.85   < > 0.77   ANION GAP mmol/L  --   --   --   --  7   CALCIUM mg/dL 9.0   < > 8.8   < > 9.3   ALBUMIN g/dL  --   --  3.5  --  3.7   TOTAL BILIRUBIN mg/dL  --   --  0.32  --  0.35   ALK PHOS U/L  --   --  60  --  61   ALT U/L  --   --  13  --  19   AST U/L  --   --  14  --  30   GLUCOSE RANDOM mg/dL 140   < > 218*   < > 151*    < > = values in this interval not displayed.         Results from last 7 days   Lab Units 02/02/24  1141 02/02/24  0632 02/01/24  2056 02/01/24  1614 02/01/24  1150 02/01/24  0731 02/01/24  0506 01/31/24  2113 01/31/24  1909 01/31/24  1638 01/31/24  1122 01/31/24  0957   POC GLUCOSE mg/dl 248* 146* 273* 177* 300* 264* 250* 142* 216* 174* 267* 345*         Results from last 7 days   Lab Units 01/27/24  0647   PROCALCITONIN ng/ml 0.16       Lines/Drains:  Invasive Devices       Peripheral Intravenous Line  Duration             Peripheral IV 01/31/24 Proximal;Right;Ventral (anterior) Forearm 2 days              Drain  Duration             External Urinary Catheter Small 3 days                          Imaging: No pertinent imaging reviewed.    Recent Cultures (last 7 days):   Results from last 7 days   Lab Units 01/27/24  1919 01/27/24  1554   BLOOD CULTURE   --  No Growth After 5 Days.  No Growth After 5 Days.   LEGIONELLA URINARY ANTIGEN  Negative  --        Last 24 Hours Medication List:   Current  Facility-Administered Medications   Medication Dose Route Frequency Provider Last Rate    acetaZOLAMIDE  250 mg Oral Daily Bubba Velasquez MD      budesonide  1 mg Nebulization Q12H Peg Junior MD      chlorhexidine  15 mL Mouth/Throat Q12H [facility] Aden Navarrete, DO      cyanocobalamin  1,000 mcg Oral Daily Aden Navarrete, DO      docusate sodium  100 mg Oral BID Bubba Velasquez MD      ergocalciferol  50,000 Units Oral Weekly Aden Navarrete, DO      guaiFENesin  1,200 mg Oral Q12H [facility] Sameer Jackson, DO      heparin (porcine)  5,000 Units Subcutaneous Q8H [facility] Olga Goodman, DO      insulin glargine  12 Units Subcutaneous HS Bubba Velasquez MD      insulin lispro  1-5 Units Subcutaneous HS Bubba Velasquez MD      insulin lispro  1-5 Units Subcutaneous TID AC Bubba Velasquez MD      insulin lispro  4 Units Subcutaneous TID With Meals Bubba Velasquez MD      ipratropium  0.5 mg Nebulization 4x Daily Aden Navarrete, DO      levalbuterol  1.25 mg Nebulization 4x Daily Sushant Banmeet, DO      melatonin  6 mg Oral HS Aden Navarrete, DO      methylPREDNISolone sodium succinate  40 mg Intravenous Q12H [facility] Sameer Jackson, DO      potassium chloride  20 mEq Oral Daily Bubba Velasquez MD      potassium chloride  20 mEq Oral Daily With Dinner Bubba Velasquez MD      pravastatin  80 mg Oral Daily With Dinner Aden Navarrete DO      senna  2 tablet Oral Daily Bubba Velasquez MD      sodium chloride  1 spray Each Nare Q1H PRN Aden Navarrete DO      sodium chloride  4 mL Nebulization 4x Daily Sushant Banai, DO      tamsulosin  0.4 mg Oral Daily With Dinner Peg Junior MD      [START ON 2/3/2024] torsemide  20 mg Oral Daily Bubba Velasquez MD          Today, Patient Was Seen By: Bubba Velasquez MD    **Please Note: This note may have been constructed using a voice recognition system.**

## 2024-02-02 NOTE — ASSESSMENT & PLAN NOTE
Patient with end-stage COPD on chronic hypoxic respiratory failure on home oxygen, chronically on prednisone prednisone 5 mg daily, budesonide/Perforomist twice daily, DuoNebs 4 times daily.   Follow-up with pulmonology as an outpatient  Presented with COPD exacerbation, CO2 retention and respiratory acidosis  Continue prednisone taper per pulm recommendation  Bipap at night and when napping

## 2024-02-03 LAB
ARTERIAL PATENCY WRIST A: NO
BASE EX.OXY STD BLDV CALC-SCNC: 73.7 % (ref 60–80)
BASE EXCESS BLDV CALC-SCNC: 22.5 MMOL/L
BUN SERPL-MCNC: 37 MG/DL (ref 5–25)
BUN SERPL-MCNC: 37 MG/DL (ref 5–25)
CALCIUM SERPL-MCNC: 9.4 MG/DL (ref 8.4–10.2)
CALCIUM SERPL-MCNC: 9.5 MG/DL (ref 8.4–10.2)
CHLORIDE SERPL-SCNC: 83 MMOL/L (ref 96–108)
CHLORIDE SERPL-SCNC: 85 MMOL/L (ref 96–108)
CO2 SERPL-SCNC: >45 MMOL/L (ref 21–32)
CO2 SERPL-SCNC: >45 MMOL/L (ref 21–32)
CREAT SERPL-MCNC: 0.83 MG/DL (ref 0.6–1.3)
CREAT SERPL-MCNC: 0.94 MG/DL (ref 0.6–1.3)
GFR SERPL CREATININE-BSD FRML MDRD: 84 ML/MIN/1.73SQ M
GFR SERPL CREATININE-BSD FRML MDRD: 91 ML/MIN/1.73SQ M
GLUCOSE SERPL-MCNC: 105 MG/DL (ref 65–140)
GLUCOSE SERPL-MCNC: 143 MG/DL (ref 65–140)
GLUCOSE SERPL-MCNC: 160 MG/DL (ref 65–140)
GLUCOSE SERPL-MCNC: 174 MG/DL (ref 65–140)
GLUCOSE SERPL-MCNC: 355 MG/DL (ref 65–140)
GLUCOSE SERPL-MCNC: 83 MG/DL (ref 65–140)
HCO3 BLDV-SCNC: 51.4 MMOL/L (ref 24–30)
MAGNESIUM SERPL-MCNC: 2.1 MG/DL (ref 1.9–2.7)
MAGNESIUM SERPL-MCNC: 2.1 MG/DL (ref 1.9–2.7)
O2 CT BLDV-SCNC: 11.2 ML/DL
PCO2 BLDV: 85.9 MM HG (ref 42–50)
PH BLDV: 7.39 [PH] (ref 7.3–7.4)
PHOSPHATE SERPL-MCNC: 3.2 MG/DL (ref 2.3–4.1)
PO2 BLDV: 41 MM HG (ref 35–45)
POTASSIUM SERPL-SCNC: 4 MMOL/L (ref 3.5–5.3)
POTASSIUM SERPL-SCNC: 4.1 MMOL/L (ref 3.5–5.3)
SODIUM SERPL-SCNC: 135 MMOL/L (ref 135–147)
SODIUM SERPL-SCNC: 136 MMOL/L (ref 135–147)

## 2024-02-03 PROCEDURE — 82805 BLOOD GASES W/O2 SATURATION: CPT | Performed by: INTERNAL MEDICINE

## 2024-02-03 PROCEDURE — 99232 SBSQ HOSP IP/OBS MODERATE 35: CPT | Performed by: INTERNAL MEDICINE

## 2024-02-03 PROCEDURE — 83735 ASSAY OF MAGNESIUM: CPT | Performed by: INTERNAL MEDICINE

## 2024-02-03 PROCEDURE — 80048 BASIC METABOLIC PNL TOTAL CA: CPT | Performed by: INTERNAL MEDICINE

## 2024-02-03 PROCEDURE — 82948 REAGENT STRIP/BLOOD GLUCOSE: CPT

## 2024-02-03 PROCEDURE — 94640 AIRWAY INHALATION TREATMENT: CPT

## 2024-02-03 PROCEDURE — 94664 DEMO&/EVAL PT USE INHALER: CPT

## 2024-02-03 PROCEDURE — 94660 CPAP INITIATION&MGMT: CPT

## 2024-02-03 PROCEDURE — 84100 ASSAY OF PHOSPHORUS: CPT | Performed by: INTERNAL MEDICINE

## 2024-02-03 PROCEDURE — 94760 N-INVAS EAR/PLS OXIMETRY 1: CPT

## 2024-02-03 RX ORDER — PREDNISONE 20 MG/1
40 TABLET ORAL DAILY
Status: DISCONTINUED | OUTPATIENT
Start: 2024-02-04 | End: 2024-02-04 | Stop reason: HOSPADM

## 2024-02-03 RX ORDER — TORSEMIDE 20 MG/1
20 TABLET ORAL DAILY
Status: DISCONTINUED | OUTPATIENT
Start: 2024-02-04 | End: 2024-02-04 | Stop reason: HOSPADM

## 2024-02-03 RX ORDER — INSULIN GLARGINE 100 [IU]/ML
10 INJECTION, SOLUTION SUBCUTANEOUS
Status: DISCONTINUED | OUTPATIENT
Start: 2024-02-03 | End: 2024-02-04 | Stop reason: HOSPADM

## 2024-02-03 RX ORDER — MAGNESIUM SULFATE HEPTAHYDRATE 40 MG/ML
2 INJECTION, SOLUTION INTRAVENOUS ONCE
Status: DISCONTINUED | OUTPATIENT
Start: 2024-02-03 | End: 2024-02-04 | Stop reason: HOSPADM

## 2024-02-03 RX ADMIN — BUDESONIDE 1 MG: 0.5 INHALANT RESPIRATORY (INHALATION) at 08:11

## 2024-02-03 RX ADMIN — CYANOCOBALAMIN TAB 500 MCG 1000 MCG: 500 TAB at 10:00

## 2024-02-03 RX ADMIN — IPRATROPIUM BROMIDE 0.5 MG: 0.5 SOLUTION RESPIRATORY (INHALATION) at 08:11

## 2024-02-03 RX ADMIN — DOCUSATE SODIUM 100 MG: 100 CAPSULE, LIQUID FILLED ORAL at 17:17

## 2024-02-03 RX ADMIN — INSULIN LISPRO 4 UNITS: 100 INJECTION, SOLUTION INTRAVENOUS; SUBCUTANEOUS at 13:00

## 2024-02-03 RX ADMIN — CHLORHEXIDINE GLUCONATE 15 ML: 1.2 SOLUTION ORAL at 21:16

## 2024-02-03 RX ADMIN — IPRATROPIUM BROMIDE 0.5 MG: 0.5 SOLUTION RESPIRATORY (INHALATION) at 12:01

## 2024-02-03 RX ADMIN — INSULIN LISPRO 3 UNITS: 100 INJECTION, SOLUTION INTRAVENOUS; SUBCUTANEOUS at 17:17

## 2024-02-03 RX ADMIN — IPRATROPIUM BROMIDE 0.5 MG: 0.5 SOLUTION RESPIRATORY (INHALATION) at 21:31

## 2024-02-03 RX ADMIN — ACETAZOLAMIDE 250 MG: 250 TABLET ORAL at 10:00

## 2024-02-03 RX ADMIN — SODIUM CHLORIDE SOLN NEBU 3% 4 ML: 3 NEBU SOLN at 21:30

## 2024-02-03 RX ADMIN — METHYLPREDNISOLONE SODIUM SUCCINATE 40 MG: 40 INJECTION, POWDER, FOR SOLUTION INTRAMUSCULAR; INTRAVENOUS at 10:00

## 2024-02-03 RX ADMIN — HEPARIN SODIUM 5000 UNITS: 5000 INJECTION INTRAVENOUS; SUBCUTANEOUS at 04:48

## 2024-02-03 RX ADMIN — PRAVASTATIN SODIUM 80 MG: 80 TABLET ORAL at 17:16

## 2024-02-03 RX ADMIN — HEPARIN SODIUM 5000 UNITS: 5000 INJECTION INTRAVENOUS; SUBCUTANEOUS at 21:16

## 2024-02-03 RX ADMIN — SODIUM CHLORIDE SOLN NEBU 3% 4 ML: 3 NEBU SOLN at 12:01

## 2024-02-03 RX ADMIN — LEVALBUTEROL HYDROCHLORIDE 1.25 MG: 1.25 SOLUTION RESPIRATORY (INHALATION) at 08:11

## 2024-02-03 RX ADMIN — POTASSIUM CHLORIDE 20 MEQ: 1500 TABLET, EXTENDED RELEASE ORAL at 10:00

## 2024-02-03 RX ADMIN — GUAIFENESIN 1200 MG: 600 TABLET, EXTENDED RELEASE ORAL at 17:16

## 2024-02-03 RX ADMIN — LEVALBUTEROL HYDROCHLORIDE 1.25 MG: 1.25 SOLUTION RESPIRATORY (INHALATION) at 21:30

## 2024-02-03 RX ADMIN — INSULIN LISPRO 4 UNITS: 100 INJECTION, SOLUTION INTRAVENOUS; SUBCUTANEOUS at 10:36

## 2024-02-03 RX ADMIN — LEVALBUTEROL HYDROCHLORIDE 1.25 MG: 1.25 SOLUTION RESPIRATORY (INHALATION) at 16:12

## 2024-02-03 RX ADMIN — MELATONIN 6 MG: at 21:16

## 2024-02-03 RX ADMIN — SODIUM CHLORIDE SOLN NEBU 3% 4 ML: 3 NEBU SOLN at 08:12

## 2024-02-03 RX ADMIN — LEVALBUTEROL HYDROCHLORIDE 1.25 MG: 1.25 SOLUTION RESPIRATORY (INHALATION) at 12:01

## 2024-02-03 RX ADMIN — INSULIN LISPRO 4 UNITS: 100 INJECTION, SOLUTION INTRAVENOUS; SUBCUTANEOUS at 17:17

## 2024-02-03 RX ADMIN — SENNOSIDES 17.2 MG: 8.6 TABLET, FILM COATED ORAL at 10:00

## 2024-02-03 RX ADMIN — TAMSULOSIN HYDROCHLORIDE 0.4 MG: 0.4 CAPSULE ORAL at 17:17

## 2024-02-03 RX ADMIN — BUDESONIDE 1 MG: 0.5 INHALANT RESPIRATORY (INHALATION) at 21:30

## 2024-02-03 RX ADMIN — INSULIN LISPRO 1 UNITS: 100 INJECTION, SOLUTION INTRAVENOUS; SUBCUTANEOUS at 21:16

## 2024-02-03 RX ADMIN — IPRATROPIUM BROMIDE 0.5 MG: 0.5 SOLUTION RESPIRATORY (INHALATION) at 16:12

## 2024-02-03 RX ADMIN — DOCUSATE SODIUM 100 MG: 100 CAPSULE, LIQUID FILLED ORAL at 10:00

## 2024-02-03 RX ADMIN — CHLORHEXIDINE GLUCONATE 15 ML: 1.2 SOLUTION ORAL at 10:00

## 2024-02-03 RX ADMIN — INSULIN GLARGINE 10 UNITS: 100 INJECTION, SOLUTION SUBCUTANEOUS at 21:16

## 2024-02-03 RX ADMIN — ERGOCALCIFEROL 50000 UNITS: 1.25 CAPSULE ORAL at 10:00

## 2024-02-03 RX ADMIN — SODIUM CHLORIDE SOLN NEBU 3% 4 ML: 3 NEBU SOLN at 16:12

## 2024-02-03 RX ADMIN — INSULIN LISPRO 1 UNITS: 100 INJECTION, SOLUTION INTRAVENOUS; SUBCUTANEOUS at 13:00

## 2024-02-03 RX ADMIN — GUAIFENESIN 1200 MG: 600 TABLET, EXTENDED RELEASE ORAL at 04:48

## 2024-02-03 NOTE — ASSESSMENT & PLAN NOTE
Patient is chronically on home oxygen between 4 to 6 L/min  Presented with severe hypoxia and shortness of breath  Placed on BiPAP in ED  Chest x-ray with mild pulmonary edema. Focal opacity in the right midlung which could represent pneumonia.     Patient is afebrile with leukocytosis  Patient currently on high flow nasal cannula this morning, weaned down to midlfow today  Continue to wean down oxygen as tolerated  Patient is full code  Likely secondary to end-stage COPD exacerbation   Pt with significant hypochloremic metabolic alkalosis, hold torsemide, continue diamox

## 2024-02-03 NOTE — PROGRESS NOTES
Progress Note - Pulmonary   Natalio Hartmann Jr. 68 y.o. male MRN: [id number]  Unit/Bed#: ICCU 202-01 Encounter: [id number]      Assessment and Plan:  Acute on chronic hypoxic and hypercapnic respiratory failure - 3L home oxygen  Very severe COPD- FEV1 22% predicted; on chronic Prednisone  HFpEF  Pulmonary cachexia  Cad  Chronic hyponatremia  Hx of prostate CA, 2021  BPH  Chronic anemia  Type II DM  KEVIN - nocturnal Trilogy  Nicotine dependence, cigarettes, in remission    The patient is improved.  - Cont. Oxygen and wean as able  - change to oral steroids starting tomorrow AM and wean by 10 mg every 3 days.  - cont. Nebulized BDAs  - Optimize volume status  - AVAPS with sleep.  - Overall pt has end-stage COPD. Pulmonary rehab would be helpful after discharge.    Discussed with pt. Concerns addressed.  Will f/u with Dr. Luque after discharge.      Chief complaint:  Yeah, why?    Subjective:   Chart and history reviewed.    Found lying in bed. Awake and making jokes. States his breathing is improved. Ambulated in hallway and felt better. Feels close to baseline. Denies cough.    Objective:   Afebrile;     Vitals: Blood pressure 107/68, pulse 88, temperature 97.7 °F (36.5 °C), temperature source Oral, resp. rate (!) 26, weight 44.3 kg (97 lb 10.6 oz), SpO2 100%., 6L, Body mass index is 17.86 kg/m².      Intake/Output Summary (Last 24 hours) at 2/3/2024 1637  Last data filed at 2/3/2024 1309  Gross per 24 hour   Intake 510 ml   Output 2625 ml   Net -2115 ml         Physical Exam  Gen: WDWN, nad, comfortable  HEENT: NCAT; EOMI; anicteric sclera  NECK: supple  Cardiac: regular  Lungs: barrel chested, diminished b/l bs no wheezing  Abdomen: soft, nd  Extremities: no c/c/e  Skin: warm, dry    Labs: I have personally reviewed pertinent lab results.  Results from last 7 days   Lab Units 01/31/24  0440 01/30/24  0455 01/29/24  0449 01/28/24  0821   WBC Thousand/uL 4.58 5.63 8.16 12.89*   HEMOGLOBIN g/dL 9.7* 9.5* 9.3* 8.3*  "  HEMATOCRIT % 31.6* 32.5* 31.0* 27.4*   PLATELETS Thousands/uL 228 220 220 189   NEUTROS PCT % 87* 88*  --   --    MONOS PCT % 7 7  --   --    MONO PCT %  --   --   --  2*   EOS PCT % 0 0  --  0      Results from last 7 days   Lab Units 02/03/24 0449 02/02/24  0632 02/01/24  0507 01/31/24  0440 01/30/24 0455 01/29/24 0449 01/28/24  0421   POTASSIUM mmol/L 4.0 3.9 3.9 3.8   < > 4.5 3.9   CHLORIDE mmol/L 83* 83* 83* 85*   < > 90* 90*   CO2 mmol/L >45* >45* >45* >45*   < > 38* 38*   BUN mg/dL 37* 34* 37* 36*   < > 34* 32*   CREATININE mg/dL 0.94 0.76 0.93 0.85   < > 0.77 0.80   CALCIUM mg/dL 9.4 9.0 8.7 8.8   < > 9.3 8.7   ALK PHOS U/L  --   --   --  60  --  61 56   ALT U/L  --   --   --  13  --  19 7   AST U/L  --   --   --  14  --  30 14    < > = values in this interval not displayed.     Results from last 7 days   Lab Units 02/03/24 0449 02/01/24 0507 01/30/24  0455   MAGNESIUM mg/dL 2.1 1.9 2.1     Results from last 7 days   Lab Units 02/03/24 0449 02/01/24  0507 01/30/24  0455   PHOSPHORUS mg/dL 3.2 2.3 3.6              No results found for: \"TROPONINI\"    Labs per my review reveal metabolic alkalosis, hypochloremia, stable anemia    Esperanza Ta D.O.  [facility]   "

## 2024-02-03 NOTE — ASSESSMENT & PLAN NOTE
Wt Readings from Last 3 Encounters:   02/03/24 44.3 kg (97 lb 10.6 oz)   01/22/24 46.7 kg (103 lb)   01/10/24 43.5 kg (96 lb)     Previous cardiac echo on 5/1/2023 with EF 20%  Reviewing outpatient cardiology note this is secondary to dilated cardiomyopathy and left bundle branch block  Will decrease torsemide to 20mg daily when contraction alkalosis is resolved

## 2024-02-03 NOTE — PROGRESS NOTES
[facility]  Progress Note  Name: Natalio Richardson I  MRN: [id number]  Unit/Bed#: ICCU 202-01 I Date of Admission: 1/27/2024   Date of Service: 2/3/2024 I Hospital Day: 7    Assessment/Plan   * Acute on chronic respiratory failure with hypoxia and hypercapnia (HCC)  Assessment & Plan  Patient is chronically on home oxygen between 4 to 6 L/min  Presented with severe hypoxia and shortness of breath  Placed on BiPAP in ED  Chest x-ray with mild pulmonary edema. Focal opacity in the right midlung which could represent pneumonia.     Patient is afebrile with leukocytosis  Patient currently on high flow nasal cannula this morning, weaned down to midlfow today  Continue to wean down oxygen as tolerated  Patient is full code  Likely secondary to end-stage COPD exacerbation   Pt with significant hypochloremic metabolic alkalosis, hold torsemide, continue diamox    End-stage COPD with acute exacerbation (HCC)  Assessment & Plan  Patient with end-stage COPD on chronic hypoxic respiratory failure on home oxygen, chronically on prednisone prednisone 5 mg daily, budesonide/Perforomist twice daily, DuoNebs 4 times daily.   Follow-up with pulmonology as an outpatient  Presented with COPD exacerbation, CO2 retention and respiratory acidosis  Continue methylprednisone  per pulm recommendation  Bipap at night and when napping    Severe protein-calorie malnutrition (HCC)  Assessment & Plan  Malnutrition Findings:         BMI Findings:           Body mass index is 17.86 kg/m².       Pulmonary cachexia due to COPD (HCC)  Assessment & Plan  Malnutrition Findings:        BMI Findings:           Body mass index is 17.86 kg/m².       Chronic hypercapnia/KEVIN  Assessment & Plan  Patient on home BiPAP at bedtime    Physical deconditioning  Assessment & Plan  Continue pt ot    Chronic hyponatremia  Assessment & Plan  Continue to monitor off salt tabs    Chronic anemia  Assessment & Plan  Patient with  macrocytic anemia, continue on B12 supplementation    Chronic HFrEF (heart failure with reduced ejection fraction)   Assessment & Plan  Wt Readings from Last 3 Encounters:   02/03/24 44.3 kg (97 lb 10.6 oz)   01/22/24 46.7 kg (103 lb)   01/10/24 43.5 kg (96 lb)     Previous cardiac echo on 5/1/2023 with EF 20%  Reviewing outpatient cardiology note this is secondary to dilated cardiomyopathy and left bundle branch block  Will decrease torsemide to 20mg daily when contraction alkalosis is resolved          Pulmonary nodules/lesions, multiple  Assessment & Plan  Being followed by pulmonary as an outpatient    Prostate cancer (HCC)  Assessment & Plan  Status post TURP    BPH with obstruction/lower urinary tract symptoms  Assessment & Plan  Continue on tamsulosin, patient with history of BPH status post TURP, monitor blood pressure    Goals of care, counseling/discussion  Assessment & Plan  Patient is a level 1 full code    Obstructive sleep apnea  Assessment & Plan  Patient with history of KEVIN, uses trilogy vent at bedtime with supplemental oxygen of 4 L               VTE Pharmacologic Prophylaxis: VTE Score: 3 Moderate Risk (Score 3-4) - Pharmacological DVT Prophylaxis Ordered: heparin.    Mobility:   Basic Mobility Inpatient Raw Score: 11  -HLM Goal: 4: Move to chair/commode  JH-HLM Achieved: 2: Bed activities/Dependent transfer  HLM Goal achieved. Continue to encourage appropriate mobility.    Patient Centered Rounds: I performed bedside rounds with nursing staff today.   Discussions with Specialists or Other Care Team Provider: nurse, CE    Education and Discussions with Family / Patient:  will update family later today.     Total Time Spent on Date of Encounter in care of patient: 40 mins. This time was spent on one or more of the following: performing physical exam; counseling and coordination of care; obtaining or reviewing history; documenting in the medical record; reviewing/ordering tests, medications or  procedures; communicating with other healthcare professionals and discussing with patient's family/caregivers.    Current Length of Stay: 7 day(s)  Current Patient Status: Inpatient   Certification Statement: The patient will continue to require additional inpatient hospital stay due to contraction alkalosis  Discharge Plan: Anticipate discharge in 24-48 hrs to home with home services.    Code Status: Level 1 - Full Code    Subjective:   Denies any new complaints.     Objective:     Vitals:   Temp (24hrs), Av.8 °F (36.6 °C), Min:97 °F (36.1 °C), Max:98.5 °F (36.9 °C)    Temp:  [97 °F (36.1 °C)-98.5 °F (36.9 °C)] 97.6 °F (36.4 °C)  HR:  [] 80  Resp:  [20-28] 28  BP: ()/(60-74) 105/71  SpO2:  [98 %-100 %] 99 %  Body mass index is 17.86 kg/m².     Input and Output Summary (last 24 hours):     Intake/Output Summary (Last 24 hours) at 2/3/2024 1354  Last data filed at 2/3/2024 1309  Gross per 24 hour   Intake 710 ml   Output 2625 ml   Net -1915 ml       Physical Exam:   Physical Exam  Constitutional:       Comments: cachectic   HENT:      Head: Normocephalic.      Nose: Nose normal.   Eyes:      Extraocular Movements: Extraocular movements intact.   Cardiovascular:      Rate and Rhythm: Normal rate and regular rhythm.   Pulmonary:      Breath sounds: Wheezing present. No rales.   Skin:     General: Skin is warm and dry.   Neurological:      Mental Status: He is alert and oriented to person, place, and time.   Psychiatric:         Mood and Affect: Mood normal.         Behavior: Behavior normal.          Additional Data:     Labs:  Results from last 7 days   Lab Units 24  0440 24  0449 24  0821   WBC Thousand/uL 4.58   < > 12.89*   HEMOGLOBIN g/dL 9.7*   < > 8.3*   HEMATOCRIT % 31.6*   < > 27.4*   PLATELETS Thousands/uL 228   < > 189   BANDS PCT %  --   --  1   NEUTROS PCT % 87*   < >  --    LYMPHS PCT % 5*   < >  --    LYMPHO PCT %  --   --  2*   MONOS PCT % 7   < >  --    MONO PCT %  --    --  2*   EOS PCT % 0   < > 0    < > = values in this interval not displayed.     Results from last 7 days   Lab Units 02/03/24  0449 02/01/24  0507 01/31/24  0440 01/30/24  0455 01/29/24  0449   SODIUM mmol/L 136   < > 139   < > 135   POTASSIUM mmol/L 4.0   < > 3.8   < > 4.5   CHLORIDE mmol/L 83*   < > 85*   < > 90*   CO2 mmol/L >45*   < > >45*   < > 38*   BUN mg/dL 37*   < > 36*   < > 34*   CREATININE mg/dL 0.94   < > 0.85   < > 0.77   ANION GAP mmol/L  --   --   --   --  7   CALCIUM mg/dL 9.4   < > 8.8   < > 9.3   ALBUMIN g/dL  --   --  3.5  --  3.7   TOTAL BILIRUBIN mg/dL  --   --  0.32  --  0.35   ALK PHOS U/L  --   --  60  --  61   ALT U/L  --   --  13  --  19   AST U/L  --   --  14  --  30   GLUCOSE RANDOM mg/dL 83   < > 218*   < > 151*    < > = values in this interval not displayed.         Results from last 7 days   Lab Units 02/03/24  1045 02/03/24  0758 02/02/24  2110 02/02/24  1615 02/02/24  1141 02/02/24  0632 02/01/24  2056 02/01/24  1614 02/01/24  1150 02/01/24  0731 02/01/24  0506 01/31/24  2113   POC GLUCOSE mg/dl 174* 105 193* 280* 248* 146* 273* 177* 300* 264* 250* 142*               Lines/Drains:  Invasive Devices       Peripheral Intravenous Line  Duration             Peripheral IV 01/31/24 Proximal;Right;Ventral (anterior) Forearm 3 days              Drain  Duration             External Urinary Catheter Small 4 days                          Imaging: No pertinent imaging reviewed.    Recent Cultures (last 7 days):   Results from last 7 days   Lab Units 01/27/24  1919 01/27/24  1554   BLOOD CULTURE   --  No Growth After 5 Days.  No Growth After 5 Days.   LEGIONELLA URINARY ANTIGEN  Negative  --        Last 24 Hours Medication List:   Current Facility-Administered Medications   Medication Dose Route Frequency Provider Last Rate    acetaZOLAMIDE  250 mg Oral Daily Bubba Velasquez MD      budesonide  1 mg Nebulization Q12H Peg Junior MD      chlorhexidine  15 mL Mouth/Throat Q12H GABE Samaniego  ANDRÉS Navarrete, DO      cyanocobalamin  1,000 mcg Oral Daily Aden Navarrete, DO      docusate sodium  100 mg Oral BID Bubba Velasquez MD      ergocalciferol  50,000 Units Oral Weekly Aden Navarrete, DO      guaiFENesin  1,200 mg Oral Q12H [facility] Sameer Jackson, DO      heparin (porcine)  5,000 Units Subcutaneous Q8H [facility] Olga Goodman, DO      insulin glargine  10 Units Subcutaneous HS Bubba Velasquez MD      insulin lispro  1-5 Units Subcutaneous HS Bubba Velaqsuez MD      insulin lispro  1-5 Units Subcutaneous TID AC Bubba Velasquez MD      insulin lispro  4 Units Subcutaneous TID With Meals Bubba Velasquez MD      ipratropium  0.5 mg Nebulization 4x Daily Aden Navarrete, DO      levalbuterol  1.25 mg Nebulization 4x Daily Sushant Hilliard, DO      melatonin  6 mg Oral HS Aden Navarrete, DO      methylPREDNISolone sodium succinate  40 mg Intravenous Q12H [facility] Sameer Jackson, DO      potassium chloride  20 mEq Oral Daily Bubba Velasquez MD      pravastatin  80 mg Oral Daily With Dinner Aden Navarrete, DO      senna  2 tablet Oral Daily Bubba Velasquez MD      sodium chloride  1 spray Each Nare Q1H PRN Aden Navarrete DO      sodium chloride  4 mL Nebulization 4x Daily Sushant Banmeet, DO      tamsulosin  0.4 mg Oral Daily With Dinner Peg Junior MD      [START ON 2/4/2024] torsemide  20 mg Oral Daily Bubba Velasquez MD          Today, Patient Was Seen By: Bubba Velasquez MD    **Please Note: This note may have been constructed using a voice recognition system.**

## 2024-02-03 NOTE — ASSESSMENT & PLAN NOTE
Patient with end-stage COPD on chronic hypoxic respiratory failure on home oxygen, chronically on prednisone prednisone 5 mg daily, budesonide/Perforomist twice daily, DuoNebs 4 times daily.   Follow-up with pulmonology as an outpatient  Presented with COPD exacerbation, CO2 retention and respiratory acidosis  Continue methylprednisone  per pulm recommendation  Bipap at night and when napping

## 2024-02-04 VITALS
TEMPERATURE: 98.2 F | SYSTOLIC BLOOD PRESSURE: 99 MMHG | HEART RATE: 92 BPM | BODY MASS INDEX: 18.29 KG/M2 | WEIGHT: 100 LBS | DIASTOLIC BLOOD PRESSURE: 65 MMHG | OXYGEN SATURATION: 100 % | RESPIRATION RATE: 26 BRPM

## 2024-02-04 LAB
BASE EX.OXY STD BLDV CALC-SCNC: 75.9 % (ref 60–80)
BASE EXCESS BLDV CALC-SCNC: 15.2 MMOL/L
BUN SERPL-MCNC: 39 MG/DL (ref 5–25)
CALCIUM SERPL-MCNC: 9.1 MG/DL (ref 8.4–10.2)
CHLORIDE SERPL-SCNC: 91 MMOL/L (ref 96–108)
CO2 SERPL-SCNC: >45 MMOL/L (ref 21–32)
CREAT SERPL-MCNC: 0.88 MG/DL (ref 0.6–1.3)
GFR SERPL CREATININE-BSD FRML MDRD: 89 ML/MIN/1.73SQ M
GLUCOSE SERPL-MCNC: 112 MG/DL (ref 65–140)
GLUCOSE SERPL-MCNC: 123 MG/DL (ref 65–140)
GLUCOSE SERPL-MCNC: 87 MG/DL (ref 65–140)
HCO3 BLDV-SCNC: 44.5 MMOL/L (ref 24–30)
NON VENT- BIPAP: ABNORMAL
O2 CT BLDV-SCNC: 12 ML/DL
PCO2 BLDV: 86.8 MM HG (ref 42–50)
PH BLDV: 7.33 [PH] (ref 7.3–7.4)
PO2 BLDV: 46.9 MM HG (ref 35–45)
POTASSIUM SERPL-SCNC: 3.9 MMOL/L (ref 3.5–5.3)
SODIUM SERPL-SCNC: 136 MMOL/L (ref 135–147)

## 2024-02-04 PROCEDURE — 99239 HOSP IP/OBS DSCHRG MGMT >30: CPT | Performed by: INTERNAL MEDICINE

## 2024-02-04 PROCEDURE — 94760 N-INVAS EAR/PLS OXIMETRY 1: CPT

## 2024-02-04 PROCEDURE — 99232 SBSQ HOSP IP/OBS MODERATE 35: CPT | Performed by: INTERNAL MEDICINE

## 2024-02-04 PROCEDURE — 94660 CPAP INITIATION&MGMT: CPT

## 2024-02-04 PROCEDURE — 82948 REAGENT STRIP/BLOOD GLUCOSE: CPT

## 2024-02-04 PROCEDURE — 94640 AIRWAY INHALATION TREATMENT: CPT

## 2024-02-04 PROCEDURE — 80048 BASIC METABOLIC PNL TOTAL CA: CPT | Performed by: INTERNAL MEDICINE

## 2024-02-04 PROCEDURE — 94668 MNPJ CHEST WALL SBSQ: CPT

## 2024-02-04 PROCEDURE — 82805 BLOOD GASES W/O2 SATURATION: CPT | Performed by: INTERNAL MEDICINE

## 2024-02-04 PROCEDURE — 94664 DEMO&/EVAL PT USE INHALER: CPT

## 2024-02-04 RX ORDER — BISACODYL 10 MG
10 SUPPOSITORY, RECTAL RECTAL ONCE
Status: COMPLETED | OUTPATIENT
Start: 2024-02-04 | End: 2024-02-04

## 2024-02-04 RX ORDER — PREDNISONE 20 MG/1
20 TABLET ORAL DAILY
Status: DISCONTINUED | OUTPATIENT
Start: 2024-02-10 | End: 2024-02-04 | Stop reason: HOSPADM

## 2024-02-04 RX ORDER — BUDESONIDE 1 MG/2ML
1 INHALANT ORAL DAILY
Qty: 120 ML | Refills: 0 | Status: SHIPPED | OUTPATIENT
Start: 2024-02-04 | End: 2024-02-14

## 2024-02-04 RX ORDER — PREDNISONE 10 MG/1
TABLET ORAL DAILY
Qty: 30 TABLET | Refills: 0 | Status: SHIPPED | OUTPATIENT
Start: 2024-02-04 | End: 2024-02-14

## 2024-02-04 RX ORDER — PREDNISONE 10 MG/1
10 TABLET ORAL DAILY
Status: DISCONTINUED | OUTPATIENT
Start: 2024-02-13 | End: 2024-02-04 | Stop reason: HOSPADM

## 2024-02-04 RX ORDER — IPRATROPIUM BROMIDE AND ALBUTEROL SULFATE 2.5; .5 MG/3ML; MG/3ML
3 SOLUTION RESPIRATORY (INHALATION) 4 TIMES DAILY
Qty: 360 ML | Refills: 0 | Status: SHIPPED | OUTPATIENT
Start: 2024-02-04 | End: 2024-02-14

## 2024-02-04 RX ORDER — ALBUTEROL SULFATE 90 UG/1
2 AEROSOL, METERED RESPIRATORY (INHALATION) EVERY 4 HOURS PRN
Qty: 18 G | Refills: 0 | Status: SHIPPED | OUTPATIENT
Start: 2024-02-04 | End: 2024-02-14

## 2024-02-04 RX ADMIN — SODIUM CHLORIDE SOLN NEBU 3% 4 ML: 3 NEBU SOLN at 07:24

## 2024-02-04 RX ADMIN — IPRATROPIUM BROMIDE 0.5 MG: 0.5 SOLUTION RESPIRATORY (INHALATION) at 16:24

## 2024-02-04 RX ADMIN — IPRATROPIUM BROMIDE 0.5 MG: 0.5 SOLUTION RESPIRATORY (INHALATION) at 12:54

## 2024-02-04 RX ADMIN — LEVALBUTEROL HYDROCHLORIDE 1.25 MG: 1.25 SOLUTION RESPIRATORY (INHALATION) at 07:24

## 2024-02-04 RX ADMIN — TAMSULOSIN HYDROCHLORIDE 0.4 MG: 0.4 CAPSULE ORAL at 17:59

## 2024-02-04 RX ADMIN — GUAIFENESIN 1200 MG: 600 TABLET, EXTENDED RELEASE ORAL at 17:59

## 2024-02-04 RX ADMIN — PRAVASTATIN SODIUM 80 MG: 80 TABLET ORAL at 17:59

## 2024-02-04 RX ADMIN — ACETAZOLAMIDE 250 MG: 250 TABLET ORAL at 08:45

## 2024-02-04 RX ADMIN — BISACODYL 10 MG: 10 SUPPOSITORY RECTAL at 12:38

## 2024-02-04 RX ADMIN — BUDESONIDE 1 MG: 0.5 INHALANT RESPIRATORY (INHALATION) at 07:24

## 2024-02-04 RX ADMIN — IPRATROPIUM BROMIDE 0.5 MG: 0.5 SOLUTION RESPIRATORY (INHALATION) at 07:24

## 2024-02-04 RX ADMIN — INSULIN LISPRO 4 UNITS: 100 INJECTION, SOLUTION INTRAVENOUS; SUBCUTANEOUS at 12:38

## 2024-02-04 RX ADMIN — DOCUSATE SODIUM 100 MG: 100 CAPSULE, LIQUID FILLED ORAL at 08:45

## 2024-02-04 RX ADMIN — LEVALBUTEROL HYDROCHLORIDE 1.25 MG: 1.25 SOLUTION RESPIRATORY (INHALATION) at 16:24

## 2024-02-04 RX ADMIN — DOCUSATE SODIUM 100 MG: 100 CAPSULE, LIQUID FILLED ORAL at 17:59

## 2024-02-04 RX ADMIN — SODIUM CHLORIDE SOLN NEBU 3% 4 ML: 3 NEBU SOLN at 16:24

## 2024-02-04 RX ADMIN — PREDNISONE 40 MG: 20 TABLET ORAL at 08:45

## 2024-02-04 RX ADMIN — POTASSIUM CHLORIDE 20 MEQ: 1500 TABLET, EXTENDED RELEASE ORAL at 08:45

## 2024-02-04 RX ADMIN — CYANOCOBALAMIN TAB 500 MCG 1000 MCG: 500 TAB at 08:45

## 2024-02-04 RX ADMIN — CHLORHEXIDINE GLUCONATE 15 ML: 1.2 SOLUTION ORAL at 08:45

## 2024-02-04 RX ADMIN — SODIUM CHLORIDE SOLN NEBU 3% 4 ML: 3 NEBU SOLN at 12:54

## 2024-02-04 RX ADMIN — SENNOSIDES 17.2 MG: 8.6 TABLET, FILM COATED ORAL at 08:45

## 2024-02-04 RX ADMIN — LEVALBUTEROL HYDROCHLORIDE 1.25 MG: 1.25 SOLUTION RESPIRATORY (INHALATION) at 12:54

## 2024-02-04 RX ADMIN — GUAIFENESIN 1200 MG: 600 TABLET, EXTENDED RELEASE ORAL at 08:45

## 2024-02-04 RX ADMIN — INSULIN LISPRO 4 UNITS: 100 INJECTION, SOLUTION INTRAVENOUS; SUBCUTANEOUS at 08:49

## 2024-02-04 NOTE — ASSESSMENT & PLAN NOTE
Patient with end-stage COPD on chronic hypoxic respiratory failure on home oxygen, chronically on prednisone prednisone 5 mg daily, budesonide/Perforomist twice daily, DuoNebs 4 times daily.   Follow-up with pulmonology as an outpatient  Presented with COPD exacerbation, CO2 retention and respiratory acidosis  Bipap at night and when napping  Likely near his baseline now  Stable for discharge home by pulmonology today  Prednisone taper odered

## 2024-02-04 NOTE — DISCHARGE SUMMARY
[facility]  Discharge- Natalio Hartmann Jr. 1955, 68 y.o. male MRN: [id number]  Unit/Bed#: ICCU 202-01 Encounter: [id number]  Primary Care Provider: Fatmata Gustafson DO   Date and time admitted to hospital: 1/27/2024  6:32 AM    * Acute on chronic respiratory failure with hypoxia and hypercapnia (HCC)  Assessment & Plan  Patient is chronically on home oxygen between 4 to 6 L/min  Presented with severe hypoxia and shortness of breath  Placed on BiPAP in ED  Chest x-ray with mild pulmonary edema. Focal opacity in the right midlung which could represent pneumonia.     Patient is afebrile with leukocytosis  Patient currently on high flow nasal cannula this morning, weaned down to midlfow today  Continue to wean down oxygen as tolerated  Patient is full code  Likely secondary to end-stage COPD exacerbation   Alkalosis has resolved with diamox  Prednisone taper  D/w pulmonology today, cleared for discharge    End-stage COPD with acute exacerbation (HCC)  Assessment & Plan  Patient with end-stage COPD on chronic hypoxic respiratory failure on home oxygen, chronically on prednisone prednisone 5 mg daily, budesonide/Perforomist twice daily, DuoNebs 4 times daily.   Follow-up with pulmonology as an outpatient  Presented with COPD exacerbation, CO2 retention and respiratory acidosis  Bipap at night and when napping  Likely near his baseline now  Stable for discharge home by pulmonology today  Prednisone taper odered    Severe protein-calorie malnutrition (HCC)  Assessment & Plan  Malnutrition Findings:       Muscle atrophy and subcutaneous fat loss  BMI Findings:           Body mass index is 18.29 kg/m².       Pulmonary cachexia due to COPD (HCC)  Assessment & Plan  Malnutrition Findings:        BMI Findings:           Body mass index is 18.29 kg/m².       Chronic hypercapnia/KEVIN  Assessment & Plan  Patient on home BiPAP at bedtime    Physical deconditioning  Assessment &  Plan  Continue pt ot    Chronic hyponatremia  Assessment & Plan  Continue to monitor off salt tabs    Chronic anemia  Assessment & Plan  Patient with macrocytic anemia, continue on B12 supplementation    Chronic HFrEF (heart failure with reduced ejection fraction)   Assessment & Plan  Wt Readings from Last 3 Encounters:   02/04/24 45.4 kg (100 lb)   01/22/24 46.7 kg (103 lb)   01/10/24 43.5 kg (96 lb)     Previous cardiac echo on 5/1/2023 with EF 20%  Reviewing outpatient cardiology note this is secondary to dilated cardiomyopathy and left bundle branch block  Resume home dose of torsemide 10mg daily          Pulmonary nodules/lesions, multiple  Assessment & Plan  Being followed by pulmonary as an outpatient    Prostate cancer (HCC)  Assessment & Plan  Status post TURP    BPH with obstruction/lower urinary tract symptoms  Assessment & Plan  Continue on tamsulosin, patient with history of BPH status post TURP, monitor blood pressure    Goals of care, counseling/discussion  Assessment & Plan  Patient is a level 1 full code    Obstructive sleep apnea  Assessment & Plan  Patient with history of KEVIN, uses trilogy vent at bedtime with supplemental oxygen of 4 L      Medical Problems       Resolved Problems  Date Reviewed: 1/30/2024   None       Discharging Physician / Practitioner: Bubba Velasquez MD  PCP: Fatmata Gustafson DO  Admission Date:   Admission Orders (From admission, onward)       Ordered        01/27/24 0847  Inpatient Admission  Once                          Discharge Date: 02/04/24    Consultations During Hospital Stay:  Pulmonology  Palliative care  Critical care    Procedures Performed:   Bipap    Significant Findings / Test Results:   CXR mild pulmonary edema. Focal opacity in the right midlung which could represent pneumonia    Incidental Findings:   None       Test Results Pending at Discharge (will require follow up):   None     Outpatient Tests Requested:  None    Complications:  None    Reason  for Admission: shortness of breath.     Hospital Course:   Natalio Hartmann Jr. is a 68 y.o. male patient who originally presented to the hospital on 1/27/2024 due to shortness of breath. He was admitted to ICU and treated with bipap, bronchodilators, steroids, diuretics, and antibiotics. Over his hospital course his high flow oxygen was discontinued and he was weaned down to his baseline oxygen requirement. He stabilized and was discharged home. Due to the severity of his disease palliative care was consulted but he declined any end of life goals of care discussions.       Please see above list of diagnoses and related plan for additional information.     Condition at Discharge: stable    Discharge Day Visit / Exam:   Subjective:  denies any new complaints. Constipation has resolved  Vitals: Blood Pressure: (!) 85/57 (02/04/24 1110)  Pulse: 94 (02/04/24 1110)  Temperature: 98 °F (36.7 °C) (02/04/24 1110)  Temp Source: Oral (02/04/24 1110)  Respirations: (!) 26 (02/03/24 1540)  Weight - Scale: 45.4 kg (100 lb) (02/04/24 0600)  SpO2: 96 % (02/04/24 1110)  Exam:   Physical Exam  Constitutional:       Comments: cachectic   HENT:      Head: Normocephalic and atraumatic.      Nose: Nose normal.   Eyes:      Extraocular Movements: Extraocular movements intact.   Cardiovascular:      Rate and Rhythm: Normal rate and regular rhythm.   Pulmonary:      Breath sounds: No wheezing or rales.   Musculoskeletal:      Right lower leg: No edema.      Left lower leg: No edema.   Skin:     General: Skin is warm and dry.   Neurological:      Mental Status: He is alert and oriented to person, place, and time.   Psychiatric:         Mood and Affect: Mood normal.         Behavior: Behavior normal.          Discussion with Family: Updated  (wife) via phone.    Discharge instructions/Information to patient and family:   See after visit summary for information provided to patient and family.      Provisions for Follow-Up Care:  See  after visit summary for information related to follow-up care and any pertinent home health orders.      Mobility at time of Discharge:   Basic Mobility Inpatient Raw Score: 11  JH-HLM Goal: 4: Move to chair/commode  JH-HLM Achieved: 2: Bed activities/Dependent transfer  HLM Goal achieved. Continue to encourage appropriate mobility.     Disposition:   Home    Planned Readmission: No     Discharge Statement:  I spent 40 minutes discharging the patient. This time was spent on the day of discharge. I had direct contact with the patient on the day of discharge. Greater than 50% of the total time was spent examining patient, answering all patient questions, arranging and discussing plan of care with patient as well as directly providing post-discharge instructions.  Additional time then spent on discharge activities.    Discharge Medications:  See after visit summary for reconciled discharge medications provided to patient and/or family.      **Please Note: This note may have been constructed using a voice recognition system**

## 2024-02-04 NOTE — PROGRESS NOTES
Progress Note - Pulmonary   Natalio Hartmann Jr. 68 y.o. male MRN: [id number]  Unit/Bed#: ICCU 202-01 Encounter: [id number]      Assessment and Plan:  Acute on chronic hypoxic and hypercapnic respiratory failure - 3L home oxygen  Very severe COPD- FEV1 22% predicted; on chronic Prednisone  HFpEF  Pulmonary cachexia  Cad  Chronic hyponatremia  Hx of prostate CA, 2021  BPH  Chronic anemia  Type II DM  KEVIN - nocturnal Trilogy  Nicotine dependence, cigarettes, in remission    The patient is unchanged, remains on home oxygen needs.  - Start Prednisone 40mg and wean by 10 mg every 3 days until off.  - Cont. Nebulized BDAs  - Cont. BIPAP with sleep and resume Trilogy at discharge  - I advised pt of his elevated carbon dioxide level and risk of untreated hypercapnia including MI, arrhythmia, sudden death. I advised him not to drive or operate machinery when sleepy. I advised him to avoid Benadryl, alcohol, pain medications, or other medications that would make him sleepy.  - I discussed that his severe end-stage COPD will limit his life. I recommended consideration for hospice when he can no longer participate in the activities he enjoys. I advised that he is at risk of recurrent exacerbations and one of those may end his life.  The pt kept his eyes closed for the conversation. His wife participated and was appropriately sad.  - Ongoing palliative care f/u as o/p as already scheduled.     Ok for discharge from pulmonary standpoint.  F/U in our office in 1-2 weeks post discharge. They will call him to arrange the visit.     Discussed with nursing, pt and pt's wife.      I am signing off. Please recall us as needed.    Chief complaint:  I'm not constipated    Subjective:   Found lying in bed. Feels like he is at baseline. Denies new complaints. Has not moved bowels recently per nursing. Pt denies abdominal discomfort.  Wife at bedside, tearful.    Objective:   Afebrile.    Vitals: Blood pressure 96/59, pulse 97, temperature (!)  "97.4 °F (36.3 °C), temperature source Axillary, resp. rate (!) 26, weight 45.4 kg (100 lb), SpO2 96%., 6L, Body mass index is 18.29 kg/m².      Intake/Output Summary (Last 24 hours) at 2/4/2024 0928  Last data filed at 2/4/2024 0833  Gross per 24 hour   Intake 1078 ml   Output 2000 ml   Net -922 ml     Physical Exam  Gen: WDWN, nad, comfortable  HEENT: NCAT; EOMI; anicteric sclera  NECK: supple  Cardiac: Regular  Lungs: diminished diffusely, no wheezing +barrel chest  Abdomen: soft, nd, nt  Extremities: no c/c/e  Skin: warm, dry    Labs: I have personally reviewed pertinent lab results.  Results from last 7 days   Lab Units 01/31/24 0440 01/30/24 0455 01/29/24 0449   WBC Thousand/uL 4.58 5.63 8.16   HEMOGLOBIN g/dL 9.7* 9.5* 9.3*   HEMATOCRIT % 31.6* 32.5* 31.0*   PLATELETS Thousands/uL 228 220 220   NEUTROS PCT % 87* 88*  --    MONOS PCT % 7 7  --    EOS PCT % 0 0  --       Results from last 7 days   Lab Units 02/04/24 0453 02/03/24 2006 02/03/24 0449 02/01/24  0507 01/31/24 0440 01/30/24 0455 01/29/24 0449   POTASSIUM mmol/L 3.9 4.1 4.0   < > 3.8   < > 4.5   CHLORIDE mmol/L 91* 85* 83*   < > 85*   < > 90*   CO2 mmol/L >45* >45* >45*   < > >45*   < > 38*   BUN mg/dL 39* 37* 37*   < > 36*   < > 34*   CREATININE mg/dL 0.88 0.83 0.94   < > 0.85   < > 0.77   CALCIUM mg/dL 9.1 9.5 9.4   < > 8.8   < > 9.3   ALK PHOS U/L  --   --   --   --  60  --  61   ALT U/L  --   --   --   --  13  --  19   AST U/L  --   --   --   --  14  --  30    < > = values in this interval not displayed.     Results from last 7 days   Lab Units 02/03/24 2006 02/03/24 0449 02/01/24  0507   MAGNESIUM mg/dL 2.1 2.1 1.9     Results from last 7 days   Lab Units 02/03/24 0449 02/01/24 0507 01/30/24  0455   PHOSPHORUS mg/dL 3.2 2.3 3.6              No results found for: \"TROPONINI\"    Labs per my review reveal metabolic alkalosis, normal renal function, anemia stable    Esperanza Ta D.O.  [facility] [facility]

## 2024-02-04 NOTE — PLAN OF CARE
Problem: PAIN - ADULT  Goal: Verbalizes/displays adequate comfort level or baseline comfort level  Description: Interventions:  - Encourage patient to monitor pain and request assistance  - Assess pain using appropriate pain scale  - Administer analgesics based on type and severity of pain and evaluate response  - Implement non-pharmacological measures as appropriate and evaluate response  - Consider cultural and social influences on pain and pain management  - Notify physician/advanced practitioner if interventions unsuccessful or patient reports new pain  Outcome: Progressing     Problem: INFECTION - ADULT  Goal: Absence or prevention of progression during hospitalization  Description: INTERVENTIONS:  - Assess and monitor for signs and symptoms of infection  - Monitor lab/diagnostic results  - Monitor all insertion sites, i.e. indwelling lines, tubes, and drains  - Monitor endotracheal if appropriate and nasal secretions for changes in amount and color  - [place] appropriate cooling/warming therapies per order  - Administer medications as ordered  - Instruct and encourage patient and family to use good hand hygiene technique  - Identify and instruct in appropriate isolation precautions for identified infection/condition  Outcome: Progressing  Goal: Absence of fever/infection during neutropenic period  Description: INTERVENTIONS:  - Monitor WBC    Outcome: Progressing

## 2024-02-04 NOTE — ASSESSMENT & PLAN NOTE
Patient is chronically on home oxygen between 4 to 6 L/min  Presented with severe hypoxia and shortness of breath  Placed on BiPAP in ED  Chest x-ray with mild pulmonary edema. Focal opacity in the right midlung which could represent pneumonia.     Patient is afebrile with leukocytosis  Patient currently on high flow nasal cannula this morning, weaned down to midlfow today  Continue to wean down oxygen as tolerated  Patient is full code  Likely secondary to end-stage COPD exacerbation   Alkalosis has resolved with diamox  Prednisone taper  D/w pulmonology today, cleared for discharge

## 2024-02-04 NOTE — ASSESSMENT & PLAN NOTE
Wt Readings from Last 3 Encounters:   02/04/24 45.4 kg (100 lb)   01/22/24 46.7 kg (103 lb)   01/10/24 43.5 kg (96 lb)     Previous cardiac echo on 5/1/2023 with EF 20%  Reviewing outpatient cardiology note this is secondary to dilated cardiomyopathy and left bundle branch block  Resume home dose of torsemide 10mg daily

## 2024-02-04 NOTE — NURSING NOTE
Discharge information was reviewed with spouse. Patient's spouse was tearful through entire discussion- RN provided education and offered  multiple times. Offered to reach out to attending to come talk to wife as she was visibly distraught, to which she refused. Ensured patient received evening medications, and a breathing treatment before discharge. Removed patients IV, disconnected him monitors, and escorted patient to [facility] in wheelchair. Patient was transferred to wife's vehicle.

## 2024-02-05 ENCOUNTER — TELEPHONE (OUTPATIENT)
Age: 69
End: 2024-02-05

## 2024-02-05 ENCOUNTER — TRANSITIONAL CARE MANAGEMENT (OUTPATIENT)
Dept: INTERNAL MEDICINE CLINIC | Facility: CLINIC | Age: 69
End: 2024-02-05

## 2024-02-05 NOTE — TELEPHONE ENCOUNTER
PA for PERFORMIST    Submitted via  [x]CMM-KEY [id number]       Office notes sent, clinical questions answered. Awaiting determination

## 2024-02-05 NOTE — TELEPHONE ENCOUNTER
Reschedule Appointment   Person speaking to  Patient   Date of original appointment 2/23    New appointment date 5/15   Location [place]    Provider Dr. Reyes    Additional Information  Placed on wait list- rescheduled due to change in provider schedule

## 2024-02-06 RX ORDER — FORMOTEROL FUMARATE DIHYDRATE 20 UG/2ML
SOLUTION RESPIRATORY (INHALATION)
Qty: 120 ML | Refills: 5 | Status: SHIPPED | OUTPATIENT
Start: 2024-02-06 | End: 2024-02-14

## 2024-02-06 NOTE — TELEPHONE ENCOUNTER
Plan has denied performist. Must have documented trial and failure of formulary medications.

## 2024-02-07 ENCOUNTER — TELEPHONE (OUTPATIENT)
Age: 69
End: 2024-02-07

## 2024-02-07 NOTE — TELEPHONE ENCOUNTER
Pt's spouse Mellisa calling in stating at pts last visit on 1/10 they were going to be ordering a nebulizer for the pt but they have no received that yet.

## 2024-02-08 DIAGNOSIS — J44.9 END STAGE COPD (HCC): Primary | ICD-10-CM

## 2024-02-08 NOTE — TELEPHONE ENCOUNTER
I called the pt and he has already received his medication. Pt requested order for a nebulizer. I faxed over order to his pharmacy as requested

## 2024-02-12 ENCOUNTER — TRANSITIONAL CARE MANAGEMENT (OUTPATIENT)
Dept: INTERNAL MEDICINE CLINIC | Facility: CLINIC | Age: 69
End: 2024-02-12

## 2024-02-28 ENCOUNTER — OFFICE VISIT (OUTPATIENT)
Dept: PULMONOLOGY | Facility: CLINIC | Age: 69
End: 2024-02-28
Payer: COMMERCIAL

## 2024-02-28 VITALS
OXYGEN SATURATION: 98 % | DIASTOLIC BLOOD PRESSURE: 76 MMHG | TEMPERATURE: 98.8 F | HEART RATE: 93 BPM | SYSTOLIC BLOOD PRESSURE: 110 MMHG

## 2024-02-28 DIAGNOSIS — J44.9 END STAGE COPD (HCC): Primary | ICD-10-CM

## 2024-02-28 DIAGNOSIS — J43.9 PULMONARY EMPHYSEMA, UNSPECIFIED EMPHYSEMA TYPE (HCC): ICD-10-CM

## 2024-02-28 DIAGNOSIS — J44.1 COPD WITH ACUTE EXACERBATION (HCC): ICD-10-CM

## 2024-02-28 DIAGNOSIS — G47.33 OBSTRUCTIVE SLEEP APNEA: ICD-10-CM

## 2024-02-28 LAB
DME PARACHUTE DELIVERY DATE REQUESTED: NORMAL
DME PARACHUTE DELIVERY NOTE: NORMAL
DME PARACHUTE ITEM DESCRIPTION: NORMAL
DME PARACHUTE ITEM DESCRIPTION: NORMAL
DME PARACHUTE ORDER STATUS: NORMAL
DME PARACHUTE SUPPLIER NAME: NORMAL
DME PARACHUTE SUPPLIER PHONE: NORMAL

## 2024-02-28 PROCEDURE — 99215 OFFICE O/P EST HI 40 MIN: CPT | Performed by: INTERNAL MEDICINE

## 2024-02-28 RX ORDER — IPRATROPIUM BROMIDE AND ALBUTEROL SULFATE 2.5; .5 MG/3ML; MG/3ML
3 SOLUTION RESPIRATORY (INHALATION) 4 TIMES DAILY
Qty: 1080 ML | Refills: 0 | Status: SHIPPED | OUTPATIENT
Start: 2024-02-28 | End: 2024-05-28

## 2024-02-28 RX ORDER — PREDNISONE 10 MG/1
10 TABLET ORAL DAILY
COMMUNITY
End: 2024-02-28 | Stop reason: SDUPTHER

## 2024-02-28 RX ORDER — PREDNISONE 10 MG/1
10 TABLET ORAL DAILY
Qty: 30 TABLET | Refills: 2 | Status: SHIPPED | OUTPATIENT
Start: 2024-02-28 | End: 2024-05-28

## 2024-02-28 RX ORDER — BUDESONIDE 0.5 MG/2ML
0.5 INHALANT ORAL 2 TIMES DAILY
COMMUNITY

## 2024-02-28 RX ORDER — NEBULIZER AND COMPRESSOR
EACH MISCELLANEOUS
COMMUNITY
Start: 2024-02-08

## 2024-02-28 NOTE — PROGRESS NOTES
Follow Up - Pulmonary Medicine   Natalio Hartmann Jr. 68 y.o. male MRN: [id number]      Natalio Hartmann Jr. is a 68 y.o. male hx KEVIN, end stage COPD and chronic hypoxia on oxygen, cachexia who presents for post hospital follow up.    Severe COPD, end stage  Chronic hypoxia and Hypercapnia  FEV1 22%, mod gas transfer defect, hyperinflation. Frequent admissions (5x last 12 months) for COPD.   Recently admitted in Jan for COPD flare, treated with steroids and discharged on home O2. Chronically on 5mg prednisone daily, per pt recently changed to 10mg daily (though not seen in notes)    - continue home 4-5L O2  - continue trilogy at night  - due tfor yearly lung Ca screening in July  - continue duonebs + pulmicort  - continue prednisone 10mg daily  - discussed pall care referral, pt wants to hold off for now    KEVIN  On Trilogy ventilator at night with suplpemental 4L O2    Follow up in 2 months w Dr Luque    Vaccines    Immunization History   Administered Date(s) Administered    COVID-19 PFIZER VACCINE 0.3 ML IM 05/25/2021, 06/15/2021, 01/15/2022    INFLUENZA 09/27/2022    Influenza Quadrivalent Preservative Free 3 years and older IM 10/14/2014    Influenza Quadrivalent Preservative Free ID 12/18/2015, 10/12/2016    Influenza Recombinant Preservative Free Im 02/01/2022    Influenza Split High Dose Preservative Free IM 09/11/2017    Influenza, high dose seasonal 0.7 mL 08/31/2020, 09/27/2022, 01/10/2024    Influenza, recombinant, quadrivalent,injectable, preservative free 11/21/2019    Influenza, seasonal, injectable 04/30/1956    Pneumococcal Conjugate 13-Valent 02/25/2021    Pneumococcal Conjugate Vaccine 20-valent (Pcv20), Polysace 09/02/2022    Pneumococcal Polysaccharide PPV23 12/01/2013    Td (adult), adsorbed 01/01/2012    Tuberculin Skin Test-PPD Intradermal 01/11/2016, 01/17/2017        I have spent a total time of 40 minutes on 02/28/24 in caring for this patient including Importance of tx compliance, Risk factor  reductions, Impressions, Counseling / Coordination of care, Documenting in the medical record, Reviewing / ordering tests, medicine, procedures  , and Obtaining or reviewing history  .   ______________________________________________________________________    HPI:    Natalio Hartmann Jr. is a 68 y.o. male hx KEVIN, end stage COPD and chronic hypoxia on oxygen, cachexia who presents for post hospital follow up.    Former 100+ Py smoker, severe COPD  5 admissions for COPD flare this year  Recently admitted in jan, improved with steroids  Currently breathing closer to baseline now, though baseline is poor ET tolerance   Uses Trilogy vent at bedtime, 4L at rest 6L with exertion    Review of Systems:  Review of Systems  Aside from what is mentioned in the HPI, the review of systems otherwise negative.    Current Medications:    Current Outpatient Medications:     formoterol (PERFOROMIST) 20 MCG/2ML nebulizer solution, Take 2 mL (20 mcg total) by nebulization 2 (two) times a day, Disp: 120 mL, Rfl: 5    Nebulizers (InnoSpire Essence Nebulizer) MISC, Use as directed, Disp: , Rfl:     Historical Information   Past Medical History:   Diagnosis Date    Acute metabolic encephalopathy 03/29/2022    Arthritis     Bladder mass     Cardiomyopathy (HCC)     Chest pain     COPD (chronic obstructive pulmonary disease) (HCC)     COVID-19 virus infection 02/23/2023    CPAP (continuous positive airway pressure) dependence     Emphysema of lung (HCC)     Hypoxia     nocturnal    Left bundle branch block     Multiple pulmonary nodules     last assessed: 10/12/16    Pneumonia     Sleep apnea, obstructive     Smoker     Weight loss 08/29/2019     Past Surgical History:   Procedure Laterality Date    COLONOSCOPY      CYSTOSCOPY      CYSTOSCOPY  02/17/2021    [place] BLADDER INSTILLATION ANTICARCINOGENIC AGENT N/A 1/3/2020    Procedure: INSTILLATION MITOMYCIN;  Surgeon: Sundeep Canchola MD;  Location: [facility];  Service: Urology    AZ CYSTO W/REMOVAL  OF LESIONS SMALL N/A 1/3/2020    Procedure: TRANSURETHRAL RESECTION OF BLADDER TUMOR (TURBT);  Surgeon: Sundeep Canchola MD;  Location[facility];  Service: Urology    MN CYSTOURETHROSCOPY WITH BIOPSY N/A 2021    Procedure: CYSTOSCOPY WITH BIOPSIES;  Surgeon: Sundeep Canchola MD;  Location[facility];  Service: Urology    MN TRURL ELECTROSURG RESCJ PROSTATE BLEED COMPLETE N/A 2021    Procedure: TRANSURETHRAL RESECTION OF PROSTATE (TURP) BLADDER BIOPSY, FULGURATION;  Surgeon: Sundeep Canchola MD;  Location: [facility];  Service: Urology     Social History   Social History     Tobacco Use   Smoking Status Former    Current packs/day: 0.00    Average packs/day: 2.5 packs/day for 42.0 years (105.0 ttl pk-yrs)    Types: Cigarettes    Start date:     Quit date:     Years since quittin.1   Smokeless Tobacco Former   Tobacco Comments    1 ppd for 37 years,  down to 5 cigs a day, is around second hand smoke       Family History:   Family History   Problem Relation Age of Onset    Diabetes Mother          PhysicalExamination:  Vitals:   /76 (BP Location: Right arm, Patient Position: Sitting, Cuff Size: Standard)   Pulse 93   Temp 98.8 °F (37.1 °C) (Tympanic)   SpO2 98%   On 4L NC    Appearance -- cachectic  HEENT -- anicteric sclera, clear OP, MMM  Neck -- no JVD  Heart -- RRR, no murmurs  Lungs -- distant breath sounds, minimal RLL wheezing on end expiration  Abdomen -- soft, NTND, +bs  Extremities -- WWP, no LE edema  Skin -- no rash  Neuro -- A&Ox3, wnl  Psych -- no obvious depression or hallucination        Diagnostic Data:  Labs:  I personally reviewed the most recent laboratory data pertinent to today's visit    Lab Results   Component Value Date    WBC 4.58 2024    HGB 9.7 (L) 2024    HCT 31.6 (L) 2024    MCV 96 2024     2024     Lab Results   Component Value Date    GLUCOSE 90 2015    CALCIUM 9.1 2024     2015    K  "3.9 02/04/2024    CO2 >45 (HH) 02/04/2024    CL 91 (L) 02/04/2024    BUN 39 (H) 02/04/2024    CREATININE 0.88 02/04/2024     No results found for: \"IGE\"  Lab Results   Component Value Date    ALT 13 01/31/2024    AST 14 01/31/2024    ALKPHOS 60 01/31/2024    BILITOT 0.60 08/17/2015       PFT results:  The most recent pulmonary function tests were reviewed.  2023: Severe COPD FEV1 22%, hyperinflaion and air trapping, mod gas transfer defect    Imaging:  I personally reviewed the images on the PAC system pertinent to today's visit  CXR 1/30/24: Mild pulmonary edema. Improving right midlung opacity.         Vidhi Elizondo MD  [facility]  "

## 2024-03-08 ENCOUNTER — TELEPHONE (OUTPATIENT)
Age: 69
End: 2024-03-08

## 2024-03-08 DIAGNOSIS — J44.9 END STAGE COPD (HCC): Primary | ICD-10-CM

## 2024-03-11 ENCOUNTER — OFFICE VISIT (OUTPATIENT)
Dept: CARDIOLOGY CLINIC | Facility: CLINIC | Age: 69
End: 2024-03-11
Payer: MEDICARE

## 2024-03-11 VITALS
HEART RATE: 75 BPM | DIASTOLIC BLOOD PRESSURE: 80 MMHG | HEIGHT: 62 IN | BODY MASS INDEX: 18.27 KG/M2 | SYSTOLIC BLOOD PRESSURE: 92 MMHG | OXYGEN SATURATION: 96 % | WEIGHT: 99.3 LBS

## 2024-03-11 DIAGNOSIS — Z09 HOSPITAL DISCHARGE FOLLOW-UP: ICD-10-CM

## 2024-03-11 DIAGNOSIS — I50.22 CHRONIC HFREF (HEART FAILURE WITH REDUCED EJECTION FRACTION) (HCC): Primary | ICD-10-CM

## 2024-03-11 DIAGNOSIS — J44.9 COPD, VERY SEVERE (HCC): ICD-10-CM

## 2024-03-11 PROCEDURE — 99214 OFFICE O/P EST MOD 30 MIN: CPT | Performed by: PHYSICIAN ASSISTANT

## 2024-03-11 RX ORDER — ARFORMOTEROL TARTRATE 15 UG/2ML
15 SOLUTION RESPIRATORY (INHALATION) 2 TIMES DAILY
Qty: 180 ML | Refills: 5 | Status: SHIPPED | OUTPATIENT
Start: 2024-03-11

## 2024-03-11 NOTE — PROGRESS NOTES
Advanced Heart Failure / Pulmonary Hypertension Outpatient Visit    Natalio Hartmann Jr. 68 y.o. male   MRN: [id number]  Encounter: [id number]    Assessment:  Patient Active Problem List    Diagnosis Date Noted    End stage COPD ([facility]) 02/08/2024    Dependence on respirator (ventilator) status ([facility]) 01/10/2024    Severe protein-calorie malnutrition ([facility]) 10/14/2023    Elevated troponin 07/31/2023    History of bladder cancer 07/31/2023    Pulmonary cachexia due to COPD ([facility])     Chronic hypercapnia/KEVIN 07/20/2023    Metabolic encephalopathy 07/20/2023    End-stage COPD with acute exacerbation ([facility]) 07/19/2023    Palliative care patient 06/13/2023    Alkalosis 06/12/2023    Malnutrition ([facility]) 06/10/2023    Hyperlipidemia 05/02/2023    Steroid-induced hyperglycemia 03/30/2023    Physical deconditioning 02/21/2023    Chronic anemia 02/17/2023    SIRS (systemic inflammatory response syndrome) 02/17/2023    Chronic hyponatremia 02/17/2023    Chronic HFrEF (heart failure with reduced ejection fraction)  09/12/2022    Pulmonary nodules/lesions, multiple 03/27/2022    Prostate cancer ([facility]) 05/24/2021    BPH with obstruction/lower urinary tract symptoms 04/13/2021    Goals of care, counseling/discussion 02/25/2021    Acute on chronic respiratory failure with hypoxia and hypercapnia ([facility]) 11/19/2020    Macrocytosis without anemia 05/23/2019    Other insomnia 02/18/2019    GERD (gastroesophageal reflux disease) 01/05/2017    Nonobstructive atherosclerosis of coronary artery 02/25/2016    Mood disorder ([facility]) 08/18/2015    Prediabetes 02/19/2015    Osteoporosis 10/14/2014    Vitamin D deficiency 10/14/2014    Nonischemic cardiomyopathy ([facility]) 09/26/2014    Left bundle-branch block 08/03/2013    Osteoarthritis 08/03/2013    Obstructive sleep apnea 07/29/2013       Today's Plan:  Volume status stable on current dose of diuretic, however patient strongly desires a lower dose as he feels like he is constantly urinating.  Discussed trialing  alternating 40 mg daily with 20 mg daily of torsemide, and discussed in detail with patient and wife that he ultimately may need to continue 40 mg daily if he notices weight gain, swelling, dyspnea with the lower dose.  Ensure shakes for protein intake.  Multiple recent hospitalizations for COPD exacerbations.  Not a candidate for advanced heart failure therapies. Not looking to pursue invasive measures at this time.   Return to clinic 2 to 3 weeks.    Plan:  Chronic HFrEF; LVEF 20%; NYHA III; ACC/AHA Stage C/D              Etiology: Nonischemic cardiomyopathy.  Possible dyssynchrony from chronic LBBB.  Despite QRS only being 120 ms, echo shows significant dyssynchrony.     Weighed 115 lb on 5/10, 105 lb on 5/16/23, 104 lb 5/18, 103 lbs, 96 lbs, 97 lbs. Today, weighs 99 lbs.     TTE 5/1/23: LVEF 20%. Severe global hypokinesis with regional variation. RV cavity size normal, systolic function normal. Trace MR, TR.   TTE 2/21/23: LVEF 30%. LVIDd 6.6cm. severe global hypokinesis. Grade I DD. RV cavity size and systolic function normal. Mild TR.   TTE 2/12/2023: LVEF 20-25%.  Severe global hypokinesis with regional variation.  Grade 1 DD.  Abnormal septal motion consistent with LBBB.  RV cavity mildly dilated, normal systolic function.  Mild MR, mild TR.  RVSP 38.  Dilated IVC.  TTE 8/26/2022: LVEF 40%.  RV cavity mildly dilated.  Systolic function normal.  Mild MR, TR.  Normal IVC.     Neurohormonal Blockade: GDMT limited by hypotension  --Beta Blocker: no  --ARNi / ACEi / ARB: losartan 12.5 mg QD, off   --Aldosterone Antagonist: no   --SGLT2 Inhibitor: no  --Home Diuretic: torsemide 40 mg QD     Sudden Cardiac Death Risk Reduction:  --ICD: LVEF 20%.      Electrical Resynchronization:  --Candidacy for BiV device: QRSd 120ms, chronic LBBB with dyssynchrony on echocardiogram.     Advanced Therapies (if appropriate): Not appropriate at this time, particularly considering advanced lung disease.        COPD (end  stage)  Former smoker; 105 pack years, quit in 2012.  Severe disease, follows closely with pulm.  On home oxygen.  Multiple hospitalizations with acute COPD exacerbations   Management per pulm.  COVID-19  Tested positive on 2/22/23.   Nonobstructive CAD  Has coronary calcium on CT  On aspirin and statin  Hyperlipidemia  Continue statin  LDL 75 8/2021  KEVIN   On BiPAP nightly  GOC.    Follows with palliative care.  Has preferred not to discuss hospice in the past.    HPI:   Natalio Hartmann Jr. is a 67-year-old male who presented to [facility] on 2/12/2023 with an acute COPD exacerbation requiring intubation. Extubated 2/15/23.  PMH includes nonischemic cardiomyopathy, chronic systolic and diastolic heart failure, nonobstructive CAD, hyperlipidemia, severe COPD, KEVIN.  Was previously admitted in 8/2022 for acute decompensated heart failure and COPD exacerbation.  LVEF 40% at that time, down from prior of 45 to 50%, though previously EF had been 35% for years.  Started on Lasix at that time.  Has been euvolemic, though blood pressures have made it difficult to start/titrate up on GDMT.  Recently saw Dr. Kevin in the office in December, at which time his Lasix was shifted to as needed and adequate oral intake and hydration were encouraged.     He was reportedly short of breath for 2 days prior to presentation, with hallucinations.  His wife called 911 and EMS intubated him in the field.  He was found to have an elevated procalcitonin on admission and started on azithromycin and ceftriaxone.  He was given IV Lasix with good urine output.  He was weaned off the ventilator and extubated on 2/15.  Maintained on BiPAP overnight and has been receiving scheduled nebulizers with aggressive pulmonary hygiene. TTE on admission with LVEF 20 to 25%, down from 40% in 8/2022.      He is also followed by pulmonology, who recommended consideration for home hospice for severe COPD in January.  He was seen by palliative care while  inpatient here, and patient and family elected to make code status level 3 DNR/DNI.  Pulmonology has been following him as well and has been managing steroids, bronchodilators, and nebulizer therapy.    Per TH: 3/7/23. Presents for post hospital follow up with his wife. Just discharged from [facility]. Feeling at his baseline. Gets SOB with minimal exertion. O2 sats in the low 80's on 3-4 L NC,  on initial assessment today. Has complaints that his feet are red and swollen today. Wants to keep fighting. Dreading another hospital admission.     Diuretics increased after visit with Bren (Lasix increased to BID x 3 days.)    Recently hospitalized from 4/30-5/10/23 for acute respiratory failure in the setting of COPD. He was admitted initially to the ICU and treated with IV steroids, scheduled nebulized breathing treatments, and increased supplemental oxygen.  He also required BiPAP.  Ultimately he was stabilized and transition to the floor.  Goals of care were discussed at length with the patient and his wife.  He is currently a DNR/DNI. Plan per chart review per patient and his wife; plan with next exacerbation is to return to the hospital, but to transition to palliative care and comfort measures at that time. Referred to home hospice as of 5/12/23, wife expressed they would like to speak to palliative on Friday before making a final decision.     5/17/23: presents today for follow up. Feels at his baseline, O2 sats mid to high 90s on home 3L NC. Some LE swelling, improves with elevating his legs. Able to complete ADLs and move around the house, SOB not increased from his baseline. Meeting with palliative care tomorrow. Denies chest pain, palpitations, dizziness, syncope. Feels like he's urinating much more with torsemide than he did with Lasix. Weighs himself daily, small fluctuations but overall stable.     6/1/23: presents today for follow up. In respiratory distress, confused, tachypneic. Wife reports this has been  worsening since this morning; no recent trigger, fevers, chills. +LE swelling. Confirmed at today's appointment that patient is currently FULL CODE. EMS called, transported via ambulance to ED, transfer order placed.    6/26/23: presents today for follow up.  Hospitalized at \[facility]\"" from 6/1 to 6/12 for respiratory distress, treated for COPD exacerbation with steroids and breathing treatments, eventually weaned off BiPAP.  Treated with IV Lasix as well.  Reaffirmed full CODE STATUS.  Doing better today, feeling improved since hospitalization.  Appetite has been down, supplementing with Ensure.  Minimal lower extremity swelling, shortness of breath at baseline.  Urinating well with torsemide.  EMS was called on 6/13 by [facility], as patient was hypoxic and short of breath on their arrival.  EMS checked oxygen per wife, reports that was above 92%.  Does report that they noticed elevated blood sugar, so she has been checking his sugars at home and noting numbers in the 200s. No further EMS calls, has been tolerating medications.     Hospitalized 7/19 through 7/21 and again 7/22 through 7/29 for COPD exacerbation.  Hospitalized again at \[facility]\"" from 7/31 to 8/3, again for hypoxic respiratory failure in the setting of his home BiPAP not working. Home oxygen at 6L NC at rest and 8L with exertion, along with nightly BiPAP. Has appropriate supplies at home.     8/14/23: presents today for follow up. Breathing at baseline. On 3L home O2 at rest, 6L with exertion. Some swelling in feet and ankles. Urinating well with current dose of torsemide. Drinking Ensure, trying to gain caloric weight. Weight between 92-96 lbs at home. Would like to explore virtual visits when possible going forward.     9/6/23: Presents today for follow up. Being treated for pneumonia by pulm, occasional chills. Taking antibiotics. Increased his torsemide to 40 mg BID x 2 days with increased urination. Has +LE swelling, increased cough from baseline. Unable to fully  cough up sputum. No fever. On consistent home O2.     10/6/23: Presents for virtual visit today. Feeling at his baseline. Feels like his appetite is adequate but gets fatigued easily when eating. Some LE swelling, at baseline. Taking torsemide 20 mg BID, urinating well with this. Breathing at baseline, coughing less since finishing antibiotic course. Feels like he needs to drink more water, reviewed hydration importance with him. Seeing palliative care and pulm next week. No cardiac complaints today.     3/11/2024: Presents today for follow-up.  Saw Dr. Funez in January, who ordered a repeat echocardiogram, which has not yet been done.  Admitted to [facility] from 1/27 to 2/4/2024 for COPD exacerbation.  Admitted to ICU and treated with BiPAP, bronchodilators, steroids, diuretics, and antibiotics.  Weaned down to baseline oxygen requirement prior to discharge.  Palliative care again consulted inpatient, but declined any end-of-life goals of care discussions.    Feeling at his baseline today.  Weights have been stable, minimal lower extremity swelling, breathing at baseline.  Having some trouble with keeping his appetite up, has been drinking about 2 Ensure shakes a day for protein.  Advised small, frequent meals throughout the day as much as tolerated.  Trying to stick to fluid and sodium restriction.  Urinating quite a bit with current torsemide dose.  Notices that when his oxygen level drops, he sees his heart rate increase, which normalizes with rest/oxygenation.  No sensation of palpitations or skipped beats.  Denies dizziness, lightheadedness, syncope.    Past Medical History:   Diagnosis Date    Acute metabolic encephalopathy 03/29/2022    Arthritis     Bladder mass     Cardiomyopathy ([facility])     Chest pain     COPD (chronic obstructive pulmonary disease) ([facility])     COVID-19 virus infection 02/23/2023    CPAP (continuous positive airway pressure) dependence     Emphysema of lung ([facility])     Hypoxia     nocturnal    Left  bundle branch block     Multiple pulmonary nodules     last assessed: 10/12/16    Pneumonia     Sleep apnea, obstructive     Smoker     Weight loss 08/29/2019     Review of Systems   Constitutional:  Negative for chills and fever.   HENT:  Negative for ear pain.    Eyes:  Negative for pain.   Respiratory:  Positive for shortness of breath (baseline).    Cardiovascular:  Negative for chest pain, palpitations and leg swelling.   Gastrointestinal:  Negative for abdominal pain and vomiting.   Genitourinary:  Negative for dysuria and hematuria.   Musculoskeletal:  Negative for arthralgias and back pain.   Skin:  Negative for rash.   Neurological:  Negative for seizures.   All other systems reviewed and are negative.  14-point ROS completed and negative except as stated above and/or in the HPI.      No Known Allergies    Current Outpatient Medications:     budesonide (PULMICORT) 0.5 mg/2 mL nebulizer solution, Take 0.5 mg by nebulization 2 (two) times a day Rinse mouth after use., Disp: , Rfl:     CALCIUM PO, Take by mouth, Disp: , Rfl:     formoterol (PERFOROMIST) 20 MCG/2ML nebulizer solution, Take 2 mL (20 mcg total) by nebulization 2 (two) times a day, Disp: 120 mL, Rfl: 5    ipratropium-albuterol (DUO-NEB) 0.5-2.5 mg/3 mL nebulizer solution, Take 3 mL by nebulization 4 (four) times a day, Disp: 1080 mL, Rfl: 0    Nebulizers (InnoSpire Essence Nebulizer) MISC, Use as directed, Disp: , Rfl:     predniSONE 10 mg tablet, Take 1 tablet (10 mg total) by mouth daily, Disp: 30 tablet, Rfl: 2    Torsemide 40 MG TABS, Take 40 mg by mouth in the morning, Disp: , Rfl:     Social History     Socioeconomic History    Marital status: /Civil Union     Spouse name: Not on file    Number of children: Not on file    Years of education: Not on file    Highest education level: Not on file   Occupational History    Not on file   Tobacco Use    Smoking status: Former     Current packs/day: 0.00     Average packs/day: 2.5 packs/day  "for 42.0 years (105.0 ttl pk-yrs)     Types: Cigarettes     Start date:      Quit date: 2012     Years since quittin.2    Smokeless tobacco: Former    Tobacco comments:     1 ppd for 37 years, 2010 down to 5 cigs a day, is around second hand smoke   Vaping Use    Vaping status: Never Used   Substance and Sexual Activity    Alcohol use: Not Currently     Comment: rarely    Drug use: No    Sexual activity: Not Currently     Partners: Female   Other Topics Concern    Not on file   Social History Narrative    Daily coffee consumption: 8 or more cups a day        Used to work in [employer].  On disability.     Social Determinants of Health     Financial Resource Strain: Not on file   Food Insecurity: No Food Insecurity (2024)    Hunger Vital Sign     Worried About Running Out of Food in the Last Year: Never true     Ran Out of Food in the Last Year: Never true   Transportation Needs: No Transportation Needs (2024)    PRAPARE - Transportation     Lack of Transportation (Medical): No     Lack of Transportation (Non-Medical): No   Physical Activity: Not on file   Stress: Not on file   Social Connections: Not on file   Intimate Partner Violence: Not on file   Housing Stability: Low Risk  (2024)    Housing Stability Vital Sign     Unable to Pay for Housing in the Last Year: No     Number of Places Lived in the Last Year: 1     Unstable Housing in the Last Year: No     Family History   Problem Relation Age of Onset    Diabetes Mother        Vitals:  Blood pressure 92/80, pulse 75, height 5' 2\" (1.575 m), weight 45 kg (99 lb 4.8 oz), SpO2 96%.  Body mass index is 18.16 kg/m².  Wt Readings from Last 10 Encounters:   24 45 kg (99 lb 4.8 oz)   24 45.4 kg (100 lb)   24 46.7 kg (103 lb)   01/10/24 43.5 kg (96 lb)   10/14/23 46.2 kg (101 lb 13.6 oz)   10/11/23 44.5 kg (98 lb)   10/06/23 44.5 kg (98 lb)   23 44.1 kg (97 lb 4.8 oz)   23 43.8 kg (96 lb 9.6 oz)   23 44.5 " "kg (98 lb 3.2 oz)     Vitals:    03/11/24 1114   BP: 92/80   BP Location: Left arm   Patient Position: Sitting   Cuff Size: Standard   Pulse: 75   SpO2: 96%   Weight: 45 kg (99 lb 4.8 oz)   Height: 5' 2\" (1.575 m)         Physical Exam  Constitutional:       General: He is not in acute distress.     Appearance: He is ill-appearing.   HENT:      Head: Normocephalic.      Nose: Nose normal.      Mouth/Throat:      Mouth: Mucous membranes are moist.   Eyes:      Conjunctiva/sclera: Conjunctivae normal.   Neck:      Vascular: No JVD.   Cardiovascular:      Rate and Rhythm: Normal rate and regular rhythm.      Comments: Trace pitting edema distal LE   Pulmonary:      Effort: Pulmonary effort is normal.      Comments: decreased air movement  Musculoskeletal:      Cervical back: Neck supple.   Skin:     General: Skin is dry.   Neurological:      General: No focal deficit present.      Mental Status: He is alert.   Psychiatric:         Mood and Affect: Mood normal.         Labs & Results:  Lab Results   Component Value Date    WBC 4.58 01/31/2024    HGB 9.7 (L) 01/31/2024    HCT 31.6 (L) 01/31/2024    MCV 96 01/31/2024     01/31/2024     Lab Results   Component Value Date    SODIUM 136 02/04/2024    K 3.9 02/04/2024    CL 91 (L) 02/04/2024    CO2 >45 (HH) 02/04/2024    BUN 39 (H) 02/04/2024    CREATININE 0.88 02/04/2024    GLUC 87 02/04/2024    CALCIUM 9.1 02/04/2024     Lab Results   Component Value Date    INR 0.78 (L) 07/19/2023    INR 0.84 06/01/2023    INR 0.93 02/12/2023    PROTIME 11.1 (L) 07/19/2023    PROTIME 11.7 06/01/2023    PROTIME 12.6 02/12/2023     Lab Results   Component Value Date     (H) 01/27/2024      Lab Results   Component Value Date    NTBNP 17,569 (H) 04/30/2023        Thank you for the opportunity to participate in the care of this patient.    Chantel Ye PA-C   "

## 2024-03-11 NOTE — PATIENT INSTRUCTIONS
Let's try alternating torsemide 40 mg daily with 20 mg daily every other day. We'll need to go back to 40 mg if you notice more swelling, weight gain, or trouble breathing.    Please weigh yourself every day (after emptying your bladder) and keep a detailed log of weights.   Contact the Heart Failure program at [phone] if you gain 3+ lbs overnight or 5+ lbs in 5-7 days.  Limit daily sodium/salt intake to 2000 mg daily to prevent fluid retention.  Avoid canned foods, fast food/Chinese food, and processed meats (hot dogs, lunch meat, and sausage etc.). Caution with condiments.  Limit fluid intake to 2000 mL or 2 liters (about 60-65 ounces) daily.  Avoid electrolyte replacement drinks (such as Gatorade, Pedialyte, Propel, Liquid IV, etc.).  Bring complete list of medications and log of daily weights to your follow-up appointment.

## 2024-03-11 NOTE — TELEPHONE ENCOUNTER
Called patient's wife-budesonide can only be twice a day however DuoNebs can be used up to every 4 hours      I have ordered Brovana-see if this will be covered by insurance

## 2024-03-13 ENCOUNTER — TELEPHONE (OUTPATIENT)
Age: 69
End: 2024-03-13

## 2024-04-15 ENCOUNTER — OFFICE VISIT (OUTPATIENT)
Dept: CARDIOLOGY CLINIC | Facility: CLINIC | Age: 69
End: 2024-04-15
Payer: COMMERCIAL

## 2024-04-15 VITALS
TEMPERATURE: 98.2 F | SYSTOLIC BLOOD PRESSURE: 108 MMHG | HEART RATE: 98 BPM | BODY MASS INDEX: 18 KG/M2 | OXYGEN SATURATION: 98 % | WEIGHT: 97.8 LBS | HEIGHT: 62 IN | DIASTOLIC BLOOD PRESSURE: 52 MMHG

## 2024-04-15 DIAGNOSIS — I50.22 CHRONIC HFREF (HEART FAILURE WITH REDUCED EJECTION FRACTION) (HCC): Primary | ICD-10-CM

## 2024-04-15 DIAGNOSIS — I42.8 NONISCHEMIC CARDIOMYOPATHY (HCC): ICD-10-CM

## 2024-04-15 DIAGNOSIS — I50.23 ACUTE ON CHRONIC HFREF (HEART FAILURE WITH REDUCED EJECTION FRACTION) (HCC): ICD-10-CM

## 2024-04-15 DIAGNOSIS — J44.9 COPD, VERY SEVERE (HCC): ICD-10-CM

## 2024-04-15 PROCEDURE — 99214 OFFICE O/P EST MOD 30 MIN: CPT | Performed by: PHYSICIAN ASSISTANT

## 2024-04-15 NOTE — PATIENT INSTRUCTIONS
Continue taking torsemide 40 mg daily alternating with 20 mg daily.  Try to eat small amounts more frequently and drink a little more water.   Check labs in the next couple weeks.     Please weigh yourself every day (after emptying your bladder) and keep a detailed log of weights.   Contact the Heart Failure program at [phone] if you gain 3+ lbs overnight or 5+ lbs in 5-7 days.  Limit daily sodium/salt intake to 2000 mg daily to prevent fluid retention.  Avoid canned foods, fast food/Chinese food, and processed meats (hot dogs, lunch meat, and sausage etc.). Caution with condiments.  Limit fluid intake to 2000 mL or 2 liters (about 60-65 ounces) daily.  Avoid electrolyte replacement drinks (such as Gatorade, Pedialyte, Propel, Liquid IV, etc.).  Bring complete list of medications and log of daily weights to your follow-up appointment.

## 2024-04-15 NOTE — PROGRESS NOTES
Advanced Heart Failure / Pulmonary Hypertension Outpatient Visit    Natalio Hartmann Jr. 68 y.o. male   MRN: [id number]  Encounter: [id number]    Assessment:  Patient Active Problem List    Diagnosis Date Noted    End stage COPD ([facility]) 02/08/2024    Dependence on respirator (ventilator) status ([facility]) 01/10/2024    Severe protein-calorie malnutrition (HCC) 10/14/2023    Elevated troponin 07/31/2023    History of bladder cancer 07/31/2023    Pulmonary cachexia due to COPD ([facility])     Chronic hypercapnia/KEVIN 07/20/2023    Metabolic encephalopathy 07/20/2023    End-stage COPD with acute exacerbation ([facility]) 07/19/2023    Palliative care patient 06/13/2023    Alkalosis 06/12/2023    Malnutrition ([facility]) 06/10/2023    Hyperlipidemia 05/02/2023    Steroid-induced hyperglycemia 03/30/2023    Physical deconditioning 02/21/2023    Chronic anemia 02/17/2023    SIRS (systemic inflammatory response syndrome) 02/17/2023    Chronic hyponatremia 02/17/2023    Chronic HFrEF (heart failure with reduced ejection fraction)  09/12/2022    Pulmonary nodules/lesions, multiple 03/27/2022    Prostate cancer ([facility]) 05/24/2021    BPH with obstruction/lower urinary tract symptoms 04/13/2021    Goals of care, counseling/discussion 02/25/2021    Acute on chronic respiratory failure with hypoxia and hypercapnia ([facility]) 11/19/2020    Macrocytosis without anemia 05/23/2019    Other insomnia 02/18/2019    GERD (gastroesophageal reflux disease) 01/05/2017    Nonobstructive atherosclerosis of coronary artery 02/25/2016    Mood disorder ([facility]) 08/18/2015    Prediabetes 02/19/2015    Osteoporosis 10/14/2014    Vitamin D deficiency 10/14/2014    Nonischemic cardiomyopathy ([facility]) 09/26/2014    Left bundle-branch block 08/03/2013    Osteoarthritis 08/03/2013    Obstructive sleep apnea 07/29/2013       Today's Plan:  Volume status stable on torsemide 20mg QD alternating with 40 mg QD. Continue.   Ensure shakes for protein intake. Advised small, frequent meals and optimizing  water intake as much as possible.  BMP in the next 1-2 weeks.   Not a candidate for advanced heart failure therapies. Not looking to pursue invasive measures at this time.   Follow up with Dr. Funez in 3 months.     Plan:  Chronic HFrEF; LVEF 20%; NYHA III; ACC/AHA Stage C/D              Etiology: Nonischemic cardiomyopathy.  Possible dyssynchrony from chronic LBBB.  Despite QRS only being 120 ms, echo shows significant dyssynchrony.     Weighed 115 lb on 5/10, 105 lb on 5/16/23, 104 lb 5/18, 103 lbs, 96 lbs, 97 lbs, 99 lbs. Today, weighs 97 lbs.     TTE 5/1/23: LVEF 20%. Severe global hypokinesis with regional variation. RV cavity size normal, systolic function normal. Trace MR, TR.   TTE 2/21/23: LVEF 30%. LVIDd 6.6cm. severe global hypokinesis. Grade I DD. RV cavity size and systolic function normal. Mild TR.   TTE 2/12/2023: LVEF 20-25%.  Severe global hypokinesis with regional variation.  Grade 1 DD.  Abnormal septal motion consistent with LBBB.  RV cavity mildly dilated, normal systolic function.  Mild MR, mild TR.  RVSP 38.  Dilated IVC.  TTE 8/26/2022: LVEF 40%.  RV cavity mildly dilated.  Systolic function normal.  Mild MR, TR.  Normal IVC.     Neurohormonal Blockade: GDMT limited by hypotension  --Beta Blocker: no  --ARNi / ACEi / ARB: losartan 12.5 mg QD, off   --Aldosterone Antagonist: no   --SGLT2 Inhibitor: no  --Home Diuretic: torsemide 40 mg QD alternating with 20 mg QD     Sudden Cardiac Death Risk Reduction:  --ICD: LVEF 20%.      Electrical Resynchronization:  --Candidacy for BiV device: QRSd 120ms, chronic LBBB with dyssynchrony on echocardiogram.     Advanced Therapies (if appropriate): Not appropriate at this time, particularly considering advanced lung disease.        COPD (end stage)  Former smoker; 105 pack years, quit in 2012.  Severe disease, follows closely with pulm.  On home oxygen.  Multiple hospitalizations with acute COPD exacerbations   Management per pulm.  COVID-19  Tested  positive on 2/22/23.   Nonobstructive CAD  Has coronary calcium on CT  On aspirin and statin  Hyperlipidemia  Continue statin  LDL 75 8/2021  KEVIN   On BiPAP nightly  GOC.    Follows with palliative care.  Has preferred not to discuss hospice in the past.    HPI:   Natalio Hartmann Jr. is a 67-year-old male who presented to [facility] on 2/12/2023 with an acute COPD exacerbation requiring intubation. Extubated 2/15/23.  PMH includes nonischemic cardiomyopathy, chronic systolic and diastolic heart failure, nonobstructive CAD, hyperlipidemia, severe COPD, KEVIN.  Was previously admitted in 8/2022 for acute decompensated heart failure and COPD exacerbation.  LVEF 40% at that time, down from prior of 45 to 50%, though previously EF had been 35% for years.  Started on Lasix at that time.  Has been euvolemic, though blood pressures have made it difficult to start/titrate up on GDMT.  Recently saw Dr. Kevin in the office in December, at which time his Lasix was shifted to as needed and adequate oral intake and hydration were encouraged.     He was reportedly short of breath for 2 days prior to presentation, with hallucinations.  His wife called 911 and EMS intubated him in the field.  He was found to have an elevated procalcitonin on admission and started on azithromycin and ceftriaxone.  He was given IV Lasix with good urine output.  He was weaned off the ventilator and extubated on 2/15.  Maintained on BiPAP overnight and has been receiving scheduled nebulizers with aggressive pulmonary hygiene. TTE on admission with LVEF 20 to 25%, down from 40% in 8/2022.      He is also followed by pulmonology, who recommended consideration for home hospice for severe COPD in January.  He was seen by palliative care while inpatient here, and patient and family elected to make code status level 3 DNR/DNI.  Pulmonology has been following him as well and has been managing steroids, bronchodilators, and nebulizer therapy.    Per TH:  3/7/23. Presents for post hospital follow up with his wife. Just discharged from [facility]. Feeling at his baseline. Gets SOB with minimal exertion. O2 sats in the low 80's on 3-4 L NC,  on initial assessment today. Has complaints that his feet are red and swollen today. Wants to keep fighting. Dreading another hospital admission.     Diuretics increased after visit with Bren (Lasix increased to BID x 3 days.)    Recently hospitalized from 4/30-5/10/23 for acute respiratory failure in the setting of COPD. He was admitted initially to the ICU and treated with IV steroids, scheduled nebulized breathing treatments, and increased supplemental oxygen.  He also required BiPAP.  Ultimately he was stabilized and transition to the floor.  Goals of care were discussed at length with the patient and his wife.  He is currently a DNR/DNI. Plan per chart review per patient and his wife; plan with next exacerbation is to return to the hospital, but to transition to palliative care and comfort measures at that time. Referred to home hospice as of 5/12/23, wife expressed they would like to speak to palliative on Friday before making a final decision.     5/17/23: presents today for follow up. Feels at his baseline, O2 sats mid to high 90s on home 3L NC. Some LE swelling, improves with elevating his legs. Able to complete ADLs and move around the house, SOB not increased from his baseline. Meeting with palliative care tomorrow. Denies chest pain, palpitations, dizziness, syncope. Feels like he's urinating much more with torsemide than he did with Lasix. Weighs himself daily, small fluctuations but overall stable.     6/1/23: presents today for follow up. In respiratory distress, confused, tachypneic. Wife reports this has been worsening since this morning; no recent trigger, fevers, chills. +LE swelling. Confirmed at today's appointment that patient is currently FULL CODE. EMS called, transported via ambulance to ED, transfer order  placed.    6/26/23: presents today for follow up.  Hospitalized at [facility] from 6/1 to 6/12 for respiratory distress, treated for COPD exacerbation with steroids and breathing treatments, eventually weaned off BiPAP.  Treated with IV Lasix as well.  Reaffirmed full CODE STATUS.  Doing better today, feeling improved since hospitalization.  Appetite has been down, supplementing with Ensure.  Minimal lower extremity swelling, shortness of breath at baseline.  Urinating well with torsemide.  EMS was called on 6/13 by [facility], as patient was hypoxic and short of breath on their arrival.  EMS checked oxygen per wife, reports that was above 92%.  Does report that they noticed elevated blood sugar, so she has been checking his sugars at home and noting numbers in the 200s. No further EMS calls, has been tolerating medications.     Hospitalized 7/19 through 7/21 and again 7/22 through 7/29 for COPD exacerbation.  Hospitalized again at [facility] from 7/31 to 8/3, again for hypoxic respiratory failure in the setting of his home BiPAP not working. Home oxygen at 6L NC at rest and 8L with exertion, along with nightly BiPAP. Has appropriate supplies at home.     8/14/23: presents today for follow up. Breathing at baseline. On 3L home O2 at rest, 6L with exertion. Some swelling in feet and ankles. Urinating well with current dose of torsemide. Drinking Ensure, trying to gain caloric weight. Weight between 92-96 lbs at home. Would like to explore virtual visits when possible going forward.     9/6/23: Presents today for follow up. Being treated for pneumonia by pulm, occasional chills. Taking antibiotics. Increased his torsemide to 40 mg BID x 2 days with increased urination. Has +LE swelling, increased cough from baseline. Unable to fully cough up sputum. No fever. On consistent home O2.     10/6/23: Presents for virtual visit today. Feeling at his baseline. Feels like his appetite is adequate but gets fatigued easily when eating. Some LE  swelling, at baseline. Taking torsemide 20 mg BID, urinating well with this. Breathing at baseline, coughing less since finishing antibiotic course. Feels like he needs to drink more water, reviewed hydration importance with him. Seeing palliative care and pulm next week. No cardiac complaints today.     3/11/2024: Presents today for follow-up.  Saw Dr. Funez in January, who ordered a repeat echocardiogram, which has not yet been done.  Admitted to [facility] from 1/27 to 2/4/2024 for COPD exacerbation.  Admitted to ICU and treated with BiPAP, bronchodilators, steroids, diuretics, and antibiotics.  Weaned down to baseline oxygen requirement prior to discharge.  Palliative care again consulted inpatient, but declined any end-of-life goals of care discussions.    Feeling at his baseline today.  Weights have been stable, minimal lower extremity swelling, breathing at baseline.  Having some trouble with keeping his appetite up, has been drinking about 2 Ensure shakes a day for protein.  Advised small, frequent meals throughout the day as much as tolerated.  Trying to stick to fluid and sodium restriction.  Urinating quite a bit with current torsemide dose.  Notices that when his oxygen level drops, he sees his heart rate increase, which normalizes with rest/oxygenation.  No sensation of palpitations or skipped beats.  Denies dizziness, lightheadedness, syncope.    4/15/24: presents today for follow up. Thinks torsemide alternating dose is working. Thinks he may getting a cold. Coughs with CPAP mask at night, recently calibrated. Using baseline oxygen (between 4-5 L NC). Intermittent foot swelling. Limited appetite. Reiterated small, frequent meals throughout the day and optimizing water intake. Some foot swelling, mild compared to baseline. Wife notices higher heart rates when oxygen levels drop occasionally.     Past Medical History:   Diagnosis Date    Acute metabolic encephalopathy 03/29/2022    Arthritis     Bladder mass      Cardiomyopathy (HCC)     Chest pain     COPD (chronic obstructive pulmonary disease) (HCC)     COVID-19 virus infection 02/23/2023    CPAP (continuous positive airway pressure) dependence     Emphysema of lung (HCC)     Hypoxia     nocturnal    Left bundle branch block     Multiple pulmonary nodules     last assessed: 10/12/16    Pneumonia     Sleep apnea, obstructive     Smoker     Weight loss 08/29/2019     Review of Systems   Constitutional:  Negative for chills, fever and unexpected weight change.   HENT:  Negative for ear pain.    Eyes:  Negative for pain.   Respiratory:  Positive for shortness of breath (baseline).    Cardiovascular:  Negative for chest pain, palpitations and leg swelling.   Gastrointestinal:  Negative for abdominal pain and vomiting.   Genitourinary:  Negative for dysuria and hematuria.   Musculoskeletal:  Negative for arthralgias and back pain.   Skin:  Negative for rash.   Neurological:  Negative for seizures.   All other systems reviewed and are negative.  14-point ROS completed and negative except as stated above and/or in the HPI.      No Known Allergies    Current Outpatient Medications:     arformoterol (BROVANA) 15 mcg/2 mL nebulizer solution, Take 2 mL (15 mcg total) by nebulization 2 (two) times a day, Disp: 180 mL, Rfl: 5    budesonide (PULMICORT) 0.5 mg/2 mL nebulizer solution, Take 0.5 mg by nebulization 2 (two) times a day Rinse mouth after use., Disp: , Rfl:     CALCIUM PO, Take by mouth, Disp: , Rfl:     ipratropium-albuterol (DUO-NEB) 0.5-2.5 mg/3 mL nebulizer solution, Take 3 mL by nebulization 4 (four) times a day, Disp: 1080 mL, Rfl: 0    Nebulizers (InnoSpire Essence Nebulizer) MISC, Use as directed, Disp: , Rfl:     predniSONE 10 mg tablet, Take 1 tablet (10 mg total) by mouth daily, Disp: 30 tablet, Rfl: 2    Torsemide 40 MG TABS, Take 40 mg by mouth in the morning, Disp: , Rfl:     Social History     Socioeconomic History    Marital status: /Civil Union      "Spouse name: Not on file    Number of children: Not on file    Years of education: Not on file    Highest education level: Not on file   Occupational History    Not on file   Tobacco Use    Smoking status: Former     Current packs/day: 0.00     Average packs/day: 2.5 packs/day for 42.0 years (105.0 ttl pk-yrs)     Types: Cigarettes     Start date:      Quit date: 2012     Years since quittin.2    Smokeless tobacco: Former    Tobacco comments:     1 ppd for 37 years, 2010 down to 5 cigs a day, is around second hand smoke   Vaping Use    Vaping status: Never Used   Substance and Sexual Activity    Alcohol use: Not Currently     Comment: rarely    Drug use: No    Sexual activity: Not Currently     Partners: Female   Other Topics Concern    Not on file   Social History Narrative    Daily coffee consumption: 8 or more cups a day        Used to work in [employer].  On disability.     Social Determinants of Health     Financial Resource Strain: Not on file   Food Insecurity: No Food Insecurity (2024)    Hunger Vital Sign     Worried About Running Out of Food in the Last Year: Never true     Ran Out of Food in the Last Year: Never true   Transportation Needs: No Transportation Needs (2024)    PRAPARE - Transportation     Lack of Transportation (Medical): No     Lack of Transportation (Non-Medical): No   Physical Activity: Not on file   Stress: Not on file   Social Connections: Not on file   Intimate Partner Violence: Not on file   Housing Stability: Low Risk  (2024)    Housing Stability Vital Sign     Unable to Pay for Housing in the Last Year: No     Number of Places Lived in the Last Year: 1     Unstable Housing in the Last Year: No     Family History   Problem Relation Age of Onset    Diabetes Mother        Vitals:  Blood pressure 108/52, pulse 98, temperature 98.2 °F (36.8 °C), height 5' 2\" (1.575 m), weight 44.4 kg (97 lb 12.8 oz), SpO2 98%.  Body mass index is 17.89 kg/m².  Wt Readings " "from Last 10 Encounters:   04/15/24 44.4 kg (97 lb 12.8 oz)   03/11/24 45 kg (99 lb 4.8 oz)   02/04/24 45.4 kg (100 lb)   01/22/24 46.7 kg (103 lb)   01/10/24 43.5 kg (96 lb)   10/14/23 46.2 kg (101 lb 13.6 oz)   10/11/23 44.5 kg (98 lb)   10/06/23 44.5 kg (98 lb)   09/06/23 44.1 kg (97 lb 4.8 oz)   07/29/23 43.8 kg (96 lb 9.6 oz)     Vitals:    04/15/24 1251   BP: 108/52   BP Location: Left arm   Patient Position: Sitting   Cuff Size: Standard   Pulse: 98   Temp: 98.2 °F (36.8 °C)   SpO2: 98%   Weight: 44.4 kg (97 lb 12.8 oz)   Height: 5' 2\" (1.575 m)           Physical Exam  Constitutional:       General: He is not in acute distress.     Appearance: He is ill-appearing.   HENT:      Head: Normocephalic.      Nose: Nose normal.      Mouth/Throat:      Mouth: Mucous membranes are moist.   Eyes:      Conjunctiva/sclera: Conjunctivae normal.   Neck:      Vascular: No JVD.   Cardiovascular:      Rate and Rhythm: Normal rate and regular rhythm.      Comments: Trace pitting edema distal LE   Pulmonary:      Effort: Pulmonary effort is normal.      Comments: decreased air movement  Musculoskeletal:      Cervical back: Neck supple.   Skin:     General: Skin is dry.   Neurological:      General: No focal deficit present.      Mental Status: He is alert.   Psychiatric:         Mood and Affect: Mood normal.         Labs & Results:  Lab Results   Component Value Date    WBC 4.58 01/31/2024    HGB 9.7 (L) 01/31/2024    HCT 31.6 (L) 01/31/2024    MCV 96 01/31/2024     01/31/2024     Lab Results   Component Value Date    SODIUM 136 02/04/2024    K 3.9 02/04/2024    CL 91 (L) 02/04/2024    CO2 >45 (HH) 02/04/2024    BUN 39 (H) 02/04/2024    CREATININE 0.88 02/04/2024    GLUC 87 02/04/2024    CALCIUM 9.1 02/04/2024     Lab Results   Component Value Date    INR 0.78 (L) 07/19/2023    INR 0.84 06/01/2023    INR 0.93 02/12/2023    PROTIME 11.1 (L) 07/19/2023    PROTIME 11.7 06/01/2023    PROTIME 12.6 02/12/2023     Lab Results "   Component Value Date     (H) 01/27/2024      Lab Results   Component Value Date    NTBNP 17,569 (H) 04/30/2023        Thank you for the opportunity to participate in the care of this patient.    Chantel Ye PA-C

## 2024-04-16 ENCOUNTER — OFFICE VISIT (OUTPATIENT)
Dept: PULMONOLOGY | Facility: CLINIC | Age: 69
End: 2024-04-16
Payer: COMMERCIAL

## 2024-04-16 VITALS
HEART RATE: 95 BPM | DIASTOLIC BLOOD PRESSURE: 58 MMHG | SYSTOLIC BLOOD PRESSURE: 108 MMHG | TEMPERATURE: 98 F | OXYGEN SATURATION: 96 %

## 2024-04-16 DIAGNOSIS — J96.12 CHRONIC RESPIRATORY FAILURE WITH HYPOXIA AND HYPERCAPNIA (HCC): ICD-10-CM

## 2024-04-16 DIAGNOSIS — J44.9 PULMONARY CACHEXIA DUE TO COPD (HCC): ICD-10-CM

## 2024-04-16 DIAGNOSIS — J44.9 END STAGE COPD (HCC): Primary | ICD-10-CM

## 2024-04-16 DIAGNOSIS — G47.33 OBSTRUCTIVE SLEEP APNEA: ICD-10-CM

## 2024-04-16 DIAGNOSIS — R91.8 PULMONARY NODULES/LESIONS, MULTIPLE: ICD-10-CM

## 2024-04-16 DIAGNOSIS — R64 PULMONARY CACHEXIA DUE TO COPD (HCC): ICD-10-CM

## 2024-04-16 DIAGNOSIS — J96.11 CHRONIC RESPIRATORY FAILURE WITH HYPOXIA AND HYPERCAPNIA (HCC): ICD-10-CM

## 2024-04-16 PROBLEM — R06.89 HYPERCAPNIA: Chronic | Status: RESOLVED | Noted: 2023-07-20 | Resolved: 2024-04-16

## 2024-04-16 PROBLEM — R79.89 ELEVATED TROPONIN: Status: RESOLVED | Noted: 2023-07-31 | Resolved: 2024-04-16

## 2024-04-16 PROBLEM — J44.1 COPD WITH ACUTE EXACERBATION (HCC): Status: RESOLVED | Noted: 2023-07-19 | Resolved: 2024-04-16

## 2024-04-16 PROCEDURE — 99215 OFFICE O/P EST HI 40 MIN: CPT | Performed by: INTERNAL MEDICINE

## 2024-04-16 PROCEDURE — G2211 COMPLEX E/M VISIT ADD ON: HCPCS | Performed by: INTERNAL MEDICINE

## 2024-04-16 RX ORDER — ALBUTEROL SULFATE 90 UG/1
2 AEROSOL, METERED RESPIRATORY (INHALATION) EVERY 6 HOURS PRN
Qty: 18 G | Refills: 6 | Status: SHIPPED | OUTPATIENT
Start: 2024-04-16

## 2024-04-16 RX ORDER — FORMOTEROL FUMARATE DIHYDRATE 20 UG/2ML
20 SOLUTION RESPIRATORY (INHALATION) 2 TIMES DAILY
Qty: 120 ML | Refills: 6 | Status: SHIPPED | OUTPATIENT
Start: 2024-04-16 | End: 2024-11-12

## 2024-04-16 NOTE — ASSESSMENT & PLAN NOTE
He will continue on supplemental oxygen with trilogy noninvasive ventilation at night.  We discussed that during the day if he is having increased fatigue, that he can use the Trilogy with naps during the day.

## 2024-04-16 NOTE — ASSESSMENT & PLAN NOTE
We discussed using protein drinks during the day with dinner at night with his family.                                BMI Findings:           There is no height or weight on file to calculate BMI.

## 2024-04-16 NOTE — ASSESSMENT & PLAN NOTE
I reviewed his compliance report.  He uses trilogy vent nightly.  EPAP median 11 cm of water pressure, positive inspiratory pressure median 21.7 cm of water pressure.

## 2024-04-16 NOTE — PROGRESS NOTES
Assessment:    Chronic respiratory failure with hypoxia and hypercapnia (HCC)  He will continue on supplemental oxygen with trilogy noninvasive ventilation at night.  We discussed that during the day if he is having increased fatigue, that he can use the Trilogy with naps during the day.    End stage COPD (HCC)  Overall his condition has not changed.  Unfortunately Penelope was not covered by his insurance.  I ordered a prescription for Perforomist twice daily.  His respiratory status is so poor that he cannot use inhalers.  He will continue on Pulmicort twice daily, DuoNeb 4 times daily.  He needed a rescue inhaler for when he is out and about.  He will continue on chronic prednisone at 10 mg daily.  Unfortunately he is unable to participate in pulmonary rehabilitation.  Despite the severity of his condition, at this time he would elect to not see palliative care.  He states at this time he is not ready to discuss hospice.    Obstructive sleep apnea  I reviewed his compliance report.  He uses trilogy vent nightly.  EPAP median 11 cm of water pressure, positive inspiratory pressure median 21.7 cm of water pressure.    Pulmonary cachexia due to COPD (HCC)  We discussed using protein drinks during the day with dinner at night with his family.                                BMI Findings:           There is no height or weight on file to calculate BMI.       Plan:    Diagnoses and all orders for this visit:    End stage COPD (HCC)  -     albuterol (PROVENTIL HFA,VENTOLIN HFA) 90 mcg/act inhaler; Inhale 2 puffs every 6 (six) hours as needed for wheezing or shortness of breath  -     formoterol (PERFOROMIST) 20 MCG/2ML nebulizer solution; Take 2 mL (20 mcg total) by nebulization 2 (two) times a day    Pulmonary nodules/lesions, multiple  -     CT chest without contrast; Future    Chronic respiratory failure with hypoxia and hypercapnia (HCC)    Obstructive sleep apnea    Pulmonary cachexia due to COPD  ([facility])        Follow-up: 3 months with FLOR, then 6 months with me      HPI:    Hospitalized in Jan for COPD exacerbation.  Was full code during that admission.  Was able to be discharged home.  Overall he states that he has good days and bad days.  On bad days he does almost no activity.  On good days he says he is able to get around the house.  He has chronic dyspnea.  Overall significant fatigue.  Chronic cough with phlegm production.    He uses a trilogy vent at night.  He states that the mornings are very difficult for him when he transitions from the positive airway pressure to simple nasal cannula.  He states that he struggles to catch his breath for several hours.    Review of Systems   Constitutional:  Positive for appetite change and fatigue. Negative for activity change.   HENT:  Positive for congestion.    Respiratory:  Positive for cough and shortness of breath.    Cardiovascular:  Positive for leg swelling.   Gastrointestinal:  Negative for vomiting.   Genitourinary:  Positive for frequency.   Musculoskeletal:  Positive for gait problem.   Neurological:  Positive for weakness and light-headedness.   Psychiatric/Behavioral:  Positive for dysphoric mood. Negative for sleep disturbance.    All other systems reviewed and are negative.      The following portions of the patient's history were reviewed and updated as appropriate: allergies, current medications, past family history, past medical history, past social history, past surgical history, and problem list.    VITALS:  Vitals:    04/16/24 0858   BP: 108/58   Pulse: 95   Temp: 98 °F (36.7 °C)   TempSrc: Tympanic   SpO2: 96%       Physical Exam  General:  Patient is awake, alert, chronically ill-appearing but in no acute respiratory distress  Neck: No JVD  CV:  Regular, +S1 and S2, No murmurs, gallops or rubs appreciated  Lungs: Greatly diminished breath sounds throughout with diffuse expiratory wheeze  Abdomen: Soft, +BS, Non-tender,  non-distended  Extremities: Lower extremity edema  Neuro: No focal deficits        Diagnostic Testing:      CBC:  Lab Results   Component Value Date    WBC 4.58 01/31/2024    HGB 9.7 (L) 01/31/2024    HCT 31.6 (L) 01/31/2024    MCV 96 01/31/2024     01/31/2024         BMP:   Lab Results   Component Value Date     08/17/2015    K 3.9 02/04/2024    CL 91 (L) 02/04/2024    CO2 >45 (HH) 02/04/2024    ANIONGAP 6 08/17/2015    BUN 39 (H) 02/04/2024    CREATININE 0.88 02/04/2024    GLUCOSE 90 08/17/2015    GLUF 124 (H) 03/30/2023    CALCIUM 9.1 02/04/2024    CORRECTEDCA 9.2 01/28/2024    AST 14 01/31/2024    ALT 13 01/31/2024    ALKPHOS 60 01/31/2024    PROT 6.8 08/17/2015    BILITOT 0.60 08/17/2015    EGFR 89 02/04/2024         Imaging studies:  I have personally reviewed pertinent reports.   and I have personally reviewed pertinent films in PACS  Ct chest   IMPRESSION:     No pulmonary embolus.     Moderate patchy bilateral groundglass opacity and consolidation, new from December 2022, infectious/inflammatory.     New 6 mm right upper lobe nodule. Per 2017 Fleischner Society guidelines, recommend follow-up with a chest CT in 6 months.     Moderate emphysema.      Malia Luque DO

## 2024-04-16 NOTE — ASSESSMENT & PLAN NOTE
Overall his condition has not changed.  Unfortunately Ahsanvana was not covered by his insurance.  I ordered a prescription for Perforomist twice daily.  His respiratory status is so poor that he cannot use inhalers.  He will continue on Pulmicort twice daily, DuoNeb 4 times daily.  He needed a rescue inhaler for when he is out and about.  He will continue on chronic prednisone at 10 mg daily.  Unfortunately he is unable to participate in pulmonary rehabilitation.  Despite the severity of his condition, at this time he would elect to not see palliative care.  He states at this time he is not ready to discuss hospice.

## 2024-05-03 DIAGNOSIS — E87.1 HYPONATREMIA: Primary | ICD-10-CM

## 2024-05-06 ENCOUNTER — TELEPHONE (OUTPATIENT)
Dept: NEPHROLOGY | Facility: CLINIC | Age: 69
End: 2024-05-06

## 2024-05-06 NOTE — TELEPHONE ENCOUNTER
Left voicemail for the patient reminding to please complete labwork prior to 5/15 appointment with Dr. Reyes  Advised patient to call back with any questions or concerns.

## 2024-05-10 NOTE — TELEPHONE ENCOUNTER
Called patient and spoke with Mellisa reminding to please patient complete labwork prior to 5/13 appointment with Dr. Reyes.

## 2024-05-13 ENCOUNTER — APPOINTMENT (OUTPATIENT)
Dept: LAB | Facility: CLINIC | Age: 69
End: 2024-05-13
Payer: COMMERCIAL

## 2024-05-13 ENCOUNTER — HOSPITAL ENCOUNTER (OUTPATIENT)
Dept: RADIOLOGY | Facility: HOSPITAL | Age: 69
Discharge: HOME | End: 2024-05-13
Attending: INTERNAL MEDICINE
Payer: COMMERCIAL

## 2024-05-13 DIAGNOSIS — I50.22 CHRONIC HFREF (HEART FAILURE WITH REDUCED EJECTION FRACTION) (HCC): ICD-10-CM

## 2024-05-13 DIAGNOSIS — R91.8 PULMONARY NODULES/LESIONS, MULTIPLE: ICD-10-CM

## 2024-05-13 DIAGNOSIS — E87.1 HYPONATREMIA: ICD-10-CM

## 2024-05-13 LAB
BUN SERPL-MCNC: 24 MG/DL (ref 5–25)
CALCIUM SERPL-MCNC: 8.8 MG/DL (ref 8.4–10.2)
CHLORIDE SERPL-SCNC: 84 MMOL/L (ref 96–108)
CO2 SERPL-SCNC: >45 MMOL/L (ref 21–32)
CREAT SERPL-MCNC: 1.02 MG/DL (ref 0.6–1.3)
GFR SERPL CREATININE-BSD FRML MDRD: 75 ML/MIN/1.73SQ M
GLUCOSE SERPL-MCNC: 150 MG/DL (ref 65–140)
POTASSIUM SERPL-SCNC: 3.7 MMOL/L (ref 3.5–5.3)
SODIUM SERPL-SCNC: 139 MMOL/L (ref 135–147)

## 2024-05-13 PROCEDURE — 71250 CT THORAX DX C-: CPT

## 2024-05-13 PROCEDURE — 80048 BASIC METABOLIC PNL TOTAL CA: CPT

## 2024-05-13 PROCEDURE — 36415 COLL VENOUS BLD VENIPUNCTURE: CPT

## 2024-05-15 ENCOUNTER — TELEPHONE (OUTPATIENT)
Dept: NEPHROLOGY | Facility: CLINIC | Age: 69
End: 2024-05-15

## 2024-05-15 ENCOUNTER — OFFICE VISIT (OUTPATIENT)
Dept: NEPHROLOGY | Facility: CLINIC | Age: 69
End: 2024-05-15
Payer: COMMERCIAL

## 2024-05-15 VITALS — SYSTOLIC BLOOD PRESSURE: 120 MMHG | HEART RATE: 69 BPM | DIASTOLIC BLOOD PRESSURE: 70 MMHG | OXYGEN SATURATION: 99 %

## 2024-05-15 DIAGNOSIS — E22.2 SIADH (SYNDROME OF INAPPROPRIATE ADH PRODUCTION) (HCC): ICD-10-CM

## 2024-05-15 DIAGNOSIS — R06.89 HYPERCAPNIA: ICD-10-CM

## 2024-05-15 DIAGNOSIS — Z79.4 TYPE 2 DIABETES MELLITUS WITHOUT COMPLICATION, WITH LONG-TERM CURRENT USE OF INSULIN (HCC): ICD-10-CM

## 2024-05-15 DIAGNOSIS — E87.1 HYPONATREMIA: ICD-10-CM

## 2024-05-15 DIAGNOSIS — J96.11 CHRONIC RESPIRATORY FAILURE WITH HYPOXIA AND HYPERCAPNIA (HCC): ICD-10-CM

## 2024-05-15 DIAGNOSIS — E11.9 TYPE 2 DIABETES MELLITUS WITHOUT COMPLICATION, WITH LONG-TERM CURRENT USE OF INSULIN (HCC): ICD-10-CM

## 2024-05-15 DIAGNOSIS — D50.8 IRON DEFICIENCY ANEMIA SECONDARY TO INADEQUATE DIETARY IRON INTAKE: Primary | ICD-10-CM

## 2024-05-15 DIAGNOSIS — I50.22 CHRONIC HFREF (HEART FAILURE WITH REDUCED EJECTION FRACTION) (HCC): ICD-10-CM

## 2024-05-15 DIAGNOSIS — E43 SEVERE PROTEIN-CALORIE MALNUTRITION (HCC): ICD-10-CM

## 2024-05-15 DIAGNOSIS — J96.12 CHRONIC RESPIRATORY FAILURE WITH HYPOXIA AND HYPERCAPNIA (HCC): ICD-10-CM

## 2024-05-15 DIAGNOSIS — J44.1 COPD EXACERBATION (HCC): ICD-10-CM

## 2024-05-15 DIAGNOSIS — I50.23 ACUTE ON CHRONIC HFREF (HEART FAILURE WITH REDUCED EJECTION FRACTION) (HCC): ICD-10-CM

## 2024-05-15 PROCEDURE — 99214 OFFICE O/P EST MOD 30 MIN: CPT | Performed by: STUDENT IN AN ORGANIZED HEALTH CARE EDUCATION/TRAINING PROGRAM

## 2024-05-15 RX ORDER — LANOLIN ALCOHOL/MO/W.PET/CERES
400 CREAM (GRAM) TOPICAL DAILY
Qty: 90 TABLET | Refills: 1 | Status: SHIPPED | OUTPATIENT
Start: 2024-05-15

## 2024-05-15 RX ORDER — FERROUS SULFATE 324(65)MG
324 TABLET, DELAYED RELEASE (ENTERIC COATED) ORAL
Qty: 90 TABLET | Refills: 1 | Status: SHIPPED | OUTPATIENT
Start: 2024-05-15

## 2024-05-15 NOTE — PROGRESS NOTES
NEPHROLOGY OUTPATIENT PROGRESS NOTE   Natalio Hartmann Jr. 69 y.o. male MRN: [id number]  DATE: 5/17/2024    Reason for visit:   Chief Complaint   Patient presents with    Follow-up    Hyponatremia        Patient Instructions   Thank you for coming to your visit today. As we discussed  your kidney function and sodium are both normal. Please follow the recommendations below       Recommend low sodium (salt) food    Avoid nonsteroidal anti-inflammatory drugs such as Naprosyn, ibuprofen, Aleve, Advil, Celebrex, Meloxicam (Mobic) etc.  You can use Tylenol as needed if you do not have any liver condition to be concerned about    Try to exercise at least 30 minutes 3 days a week to begin with with an ultimate goal of 5 days a week for at least 30 minutes  Start iron tablets once a day. If no stomach issues you can increase to 1 tablet twice a day   Folic acid once a day     He can continue follow-up with her PCP      Joselyn Reyes Bahamonde, MD  Nephrology Attending         Natalio was seen today for follow-up and hyponatremia.    Diagnoses and all orders for this visit:    Iron deficiency anemia secondary to inadequate dietary iron intake  -     ferrous sulfate 324 (65 Fe) mg; Take 1 tablet (324 mg total) by mouth daily before breakfast  -     folic acid (FOLVITE) 400 mcg tablet; Take 1 tablet (400 mcg total) by mouth daily    Acute on chronic HFrEF (heart failure with reduced ejection fraction) ([facility])        Assessment/Plan:  68 y.o with PMH of end-stage COPD, CHF, CAD and hyponatremia.  Patient was admitted with altered mental status, respiratory distress complicated with hyponatremia.   patient is here for follow-up after hospital discharge      PLAN:      #Hyposomolar Hyponatremia/SIADH  Sodium on admission: 119  Serum osm: 273  Urine osm: 261   Urine Sodium: 38  Current sodium 139 mEq/L, back to normal  Etiology: Multifactorial, secondary to fluid overload with possible component of SIADH   Plan:  On torsemide 40mg   Fluid  restriction 1.5 to 1.9 L  Will monitor BMP     #Volume status/hypertension:  Volume: Euvolemic at this time  Blood pressure: Normotensive, /70mmHg   Recommend:  Torsemide increased to 40 mg     #COPD/chronic respiratory failure  Chronic respiratory failure     #Protein calorie malnutrition  Cachectic  BMI 17.8  Protein supplements     #DM  HbA1c 6.5  insulin  Advised to maintain a good DM control to prevent progression of CKD   Maintain healthy diet (vegetables, fruits, whole grains, nonfat or low fat)  Weight loss  Physical activity (5 to 10 minutes to start the increase to 30 min a day)     # hypercapnia   elevated PCO2   severe COPD     #CHF  Recent decompensation, EF 20%  Torsemide as above  Low-sodium diet, daily weight  Follow-up with cardiology    #Elevated bicarbonate   In the settings of COPD and diuretics       SUBJECTIVE / INTERVAL HISTORY:  69 y.o. male presents in follow up of hyponatremia .  Patient with recent CHF decompensation, on diuretics.  Patient complains of shortness of breath although it seems to be stable at this time.  With cardiology    Natalio Hartmann Jr. denies any recent illness/hospitalizations/medication changes since last office visit.    Review of Systems   Constitutional:  Negative for activity change and appetite change.   HENT:  Negative for congestion and dental problem.    Eyes:  Negative for discharge.   Respiratory:  Positive for shortness of breath. Negative for apnea.    Gastrointestinal:  Negative for abdominal distention.   Endocrine: Negative for cold intolerance.   Genitourinary:  Negative for dysuria.   Musculoskeletal:  Negative for arthralgias.   Skin:  Negative for color change.   Neurological:  Negative for dizziness.   Psychiatric/Behavioral:  Negative for agitation.        OBJECTIVE:  /70 (BP Location: Left arm, Patient Position: Sitting, Cuff Size: Adult)   Pulse 69   SpO2 99%  There is no height or weight on file to calculate BMI.    Physical  exam:  Physical Exam  General:  no acute distress at this time  Skin:  No acute rash  Eyes:  No scleral icterus and noninjected  ENT:  mucous membranes moist  Neck:  no carotid bruits  Chest: Crackles and wheezing , good respiratory effort, no use of accessory respiratory muscles  CVS:  Regular rate and rhythm without rub   Abdomen:  soft and nontender   Extremities: no significant lower extremity edema  Neuro:  No gross focality  Psych:  Alert , cooperative       Medications:    Current Outpatient Medications:     albuterol (PROVENTIL HFA,VENTOLIN HFA) 90 mcg/act inhaler, Inhale 2 puffs every 6 (six) hours as needed for wheezing or shortness of breath, Disp: 18 g, Rfl: 6    budesonide (PULMICORT) 0.5 mg/2 mL nebulizer solution, Take 0.5 mg by nebulization 2 (two) times a day Rinse mouth after use., Disp: , Rfl:     CALCIUM PO, Take by mouth, Disp: , Rfl:     ferrous sulfate 324 (65 Fe) mg, Take 1 tablet (324 mg total) by mouth daily before breakfast, Disp: 90 tablet, Rfl: 1    folic acid (FOLVITE) 400 mcg tablet, Take 1 tablet (400 mcg total) by mouth daily, Disp: 90 tablet, Rfl: 1    formoterol (PERFOROMIST) 20 MCG/2ML nebulizer solution, Take 2 mL (20 mcg total) by nebulization 2 (two) times a day, Disp: 120 mL, Rfl: 6    ipratropium-albuterol (DUO-NEB) 0.5-2.5 mg/3 mL nebulizer solution, Take 3 mL by nebulization 4 (four) times a day, Disp: 1080 mL, Rfl: 0    Nebulizers (InnoSpire Essence Nebulizer) MISC, Use as directed, Disp: , Rfl:     predniSONE 10 mg tablet, Take 1 tablet (10 mg total) by mouth daily, Disp: 30 tablet, Rfl: 2    Torsemide 40 MG TABS, Take 40 mg by mouth in the morning Take torsemide 20 mg daily alternating with 40 mg daily, Disp: , Rfl:     Allergies:  Allergies as of 05/15/2024    (No Known Allergies)       The following portions of the patient's history were reviewed and updated as appropriate: past family history, past surgical history and problem list.    Laboratory Results:  Lab Results  "  Component Value Date    SODIUM 139 05/13/2024    K 3.7 05/13/2024    CL 84 (L) 05/13/2024    CO2 >45 (H) 05/13/2024    BUN 24 05/13/2024    CREATININE 1.02 05/13/2024    GLUC 150 (H) 05/13/2024    CALCIUM 8.8 05/13/2024        Lab Results   Component Value Date    CALCIUM 8.8 05/13/2024    PHOS 3.2 02/03/2024       Portions of the record may have been created with voice recognition software.  Occasional wrong word or \"sound a like\" substitutions may have occurred due to the inherent limitations of voice recognition software.  Read the chart carefully and recognize, using context, where substitutions have occurred.    "

## 2024-05-15 NOTE — TELEPHONE ENCOUNTER
Per Dr. Reyes  Called patient and spoke with Mellisa asked if they would like to coming today early because Dr. Reyes have spot available at 12pm. Patient appointment is for today at 4pm. Mellisa stating that she will call our office to let us know if they can coming today at 12pm.

## 2024-05-15 NOTE — PATIENT INSTRUCTIONS
Thank you for coming to your visit today. As we discussed  your kidney function and sodium are both normal. Please follow the recommendations below       Recommend low sodium (salt) food    Avoid nonsteroidal anti-inflammatory drugs such as Naprosyn, ibuprofen, Aleve, Advil, Celebrex, Meloxicam (Mobic) etc.  You can use Tylenol as needed if you do not have any liver condition to be concerned about    Try to exercise at least 30 minutes 3 days a week to begin with with an ultimate goal of 5 days a week for at least 30 minutes  Start iron tablets once a day. If no stomach issues you can increase to 1 tablet twice a day   Folic acid once a day     He can continue follow-up with her PCP      Joselyn Reyes Bahamonde, MD  Nephrology Attending

## 2024-05-17 PROBLEM — E22.2 SIADH (SYNDROME OF INAPPROPRIATE ADH PRODUCTION) (HCC): Status: ACTIVE | Noted: 2024-05-17

## 2024-05-17 PROBLEM — E11.9 TYPE 2 DIABETES MELLITUS WITHOUT COMPLICATION, WITH LONG-TERM CURRENT USE OF INSULIN (HCC): Status: ACTIVE | Noted: 2024-05-17

## 2024-05-17 PROBLEM — D50.8 IRON DEFICIENCY ANEMIA SECONDARY TO INADEQUATE DIETARY IRON INTAKE: Status: ACTIVE | Noted: 2024-05-17

## 2024-05-17 PROBLEM — Z79.4 TYPE 2 DIABETES MELLITUS WITHOUT COMPLICATION, WITH LONG-TERM CURRENT USE OF INSULIN (HCC): Status: ACTIVE | Noted: 2024-05-17

## 2024-05-28 ENCOUNTER — TELEPHONE (OUTPATIENT)
Age: 69
End: 2024-05-28

## 2024-05-28 DIAGNOSIS — J44.9 END STAGE COPD (HCC): Primary | ICD-10-CM

## 2024-05-28 DIAGNOSIS — R91.8 ABNORMAL CT SCAN OF LUNG: Primary | ICD-10-CM

## 2024-05-28 RX ORDER — BUDESONIDE 0.5 MG/2ML
0.5 INHALANT ORAL 2 TIMES DAILY
Qty: 60 ML | Refills: 5 | Status: SHIPPED | OUTPATIENT
Start: 2024-05-28

## 2024-05-28 RX ORDER — AZITHROMYCIN 250 MG/1
TABLET, FILM COATED ORAL
Qty: 6 TABLET | Refills: 0 | Status: SHIPPED | OUTPATIENT
Start: 2024-05-28 | End: 2024-06-01

## 2024-05-28 NOTE — PROGRESS NOTES
I called and spoke with the patient's wife  He has had an increased cough over the past week  In light of CT chest showing new nodular opacity - will treat with antibiotics  I sent in Zithromax as he has tolerated in the past    If no improvement she will call the office    He is scheduled to see our team in July - plan to order CT at that time  Follow up imaging in 4-6 months depending on symptoms

## 2024-05-29 NOTE — TELEPHONE ENCOUNTER
Called spoke with patient Dr. Luque spoke to patient last night as well.  Patient initiated on antibiotic

## 2024-06-07 DIAGNOSIS — J44.9 END STAGE COPD (HCC): ICD-10-CM

## 2024-06-07 RX ORDER — PREDNISONE 10 MG/1
10 TABLET ORAL DAILY
Qty: 30 TABLET | Refills: 2 | Status: SHIPPED | OUTPATIENT
Start: 2024-06-07

## 2024-07-11 ENCOUNTER — TELEPHONE (OUTPATIENT)
Dept: PULMONOLOGY | Facility: CLINIC | Age: 69
End: 2024-07-11

## 2024-07-16 ENCOUNTER — TELEPHONE (OUTPATIENT)
Age: 69
End: 2024-07-16

## 2024-07-16 NOTE — TELEPHONE ENCOUNTER
Patient's wife calling asking if Dr. Luque received  the graduation invitations she dropped off for her.    Patient's wife asking for a call back

## 2024-07-18 NOTE — TELEPHONE ENCOUNTER
Patient called in and would like to confirm if  received the graduation invitations she dropped off . Please advise

## 2024-07-29 ENCOUNTER — OFFICE VISIT (OUTPATIENT)
Dept: PULMONOLOGY | Facility: CLINIC | Age: 69
End: 2024-07-29
Payer: MEDICARE

## 2024-07-29 VITALS
SYSTOLIC BLOOD PRESSURE: 112 MMHG | OXYGEN SATURATION: 97 % | WEIGHT: 98.6 LBS | TEMPERATURE: 97.7 F | BODY MASS INDEX: 18.14 KG/M2 | HEART RATE: 87 BPM | DIASTOLIC BLOOD PRESSURE: 60 MMHG | HEIGHT: 62 IN

## 2024-07-29 DIAGNOSIS — J44.9 END STAGE COPD (HCC): ICD-10-CM

## 2024-07-29 DIAGNOSIS — R91.8 PULMONARY NODULES/LESIONS, MULTIPLE: Primary | ICD-10-CM

## 2024-07-29 PROCEDURE — 99214 OFFICE O/P EST MOD 30 MIN: CPT | Performed by: NURSE PRACTITIONER

## 2024-07-29 RX ORDER — AMOXICILLIN AND CLAVULANATE POTASSIUM 875; 125 MG/1; MG/1
1 TABLET, FILM COATED ORAL EVERY 12 HOURS SCHEDULED
Qty: 20 TABLET | Refills: 0 | Status: SHIPPED | OUTPATIENT
Start: 2024-07-29 | End: 2024-08-08

## 2024-07-29 RX ORDER — PREDNISONE 10 MG/1
TABLET ORAL
Qty: 20 TABLET | Refills: 0 | Status: SHIPPED | OUTPATIENT
Start: 2024-07-29

## 2024-07-29 RX ORDER — BUDESONIDE 0.5 MG/2ML
0.5 INHALANT ORAL 2 TIMES DAILY
Qty: 180 ML | Refills: 5 | Status: SHIPPED | OUTPATIENT
Start: 2024-07-29 | End: 2024-10-27

## 2024-07-29 NOTE — PROGRESS NOTES
"  Progress Note - Pulmonary   Natalio Hartmann Jr. 69 y.o. male MRN: [id number]  Unit/Bed#:  Encounter: [id number]    Assessment/Plan:    1.  End-stage COPD with possible acute exacerbation        -Currently at respiratory baseline        -Some scattered wheezing noted upon upper lobes        -Will give 5-day prednisone burst        -Home regimen: Budesonide/Perforomist twice daily, Proventil 2 puffs every 6 as needed, prednisone 10 mg daily        -Previously seen by palliative care not interested in resuming    2.  KEVIN        -Maintained on Trilogy-minimum EPAP 10 cm H2O max EPAP 20 cm of H2O min PS 10 cm of H2O target patient rate 15 breaths target IV ALARA ventilation 5.4 L        -Compliance report: 6/28/2024-7/27/2024        -Average leak time 15 minutes        -Median IPAP 22 cm of H2O        -Median EPAP 11 cm of H2O             -Median tidal volume 486.9 mL        -Medium respiration rate 22    3.  Abnormal chest CT        -5/2024-2 cm left lower lobe focal opacity        -Etiology somewhat unclear: Infection vs neoplasm        -Previous 30 completed course of azithromycin        -Ordered 10-day course of Augmentin        -Will repeat imaging in 3 to 4 weeks        -Patient extremely high risk for any procedure that would quire biopsy                Chief Complaint:    \" My nose is running\"    HPI:    Natalio is here for 3-month follow-up.  He reports that overall he feels a little bit more short of breath.  Patient reports that he could possibly have a little infection as he feels like he is having some symptoms such as fatigue and increased nasal discharge.  Patient currently denying any fever night sweats, pleuritic chest pain, or palpitations.    Objective:    Vitals: There were no vitals taken for this visit.,There is no height or weight on file to calculate BMI./60 (BP Location: Left arm, Patient Position: Sitting, Cuff Size: Standard)   Pulse 87   Temp 97.7 °F (36.5 °C) (Temporal)   Ht 5' 2\" " (1.575 m)   Wt 44.7 kg (98 lb 9.6 oz)   SpO2 97%   BMI 18.03 kg/m²           Ros  SOB  cough    Physical Exam:     Physical Exam  Constitutional:       General: He is not in acute distress.     Appearance: Normal appearance. He is normal weight. He is not ill-appearing.   HENT:      Head: Normocephalic and atraumatic.      Nose: Nose normal. No congestion or rhinorrhea.   Cardiovascular:      Rate and Rhythm: Normal rate and regular rhythm.      Pulses: Normal pulses.      Heart sounds: Normal heart sounds. No murmur heard.     No friction rub. No gallop.   Pulmonary:      Effort: Pulmonary effort is normal. No respiratory distress.      Breath sounds: No stridor. No wheezing, rhonchi or rales.      Comments: Some diminished aeration at bases  Chest:      Chest wall: No tenderness.   Abdominal:      General: Abdomen is flat. Bowel sounds are normal. There is no distension.      Palpations: Abdomen is soft. There is no mass.   Musculoskeletal:         General: No swelling or tenderness. Normal range of motion.      Cervical back: Normal range of motion.   Skin:     General: Skin is warm and dry.      Coloration: Skin is not jaundiced or pale.   Neurological:      General: No focal deficit present.      Mental Status: He is alert and oriented to person, place, and time. Mental status is at baseline.   Psychiatric:         Mood and Affect: Mood normal.         Behavior: Behavior normal.             Imaging and other studies: I have personally reviewed pertinent films in PACS      Results: 8/31/2020  FEV1/FVC Ratio: 30 %  Forced Vital Capacity: 2.00 L    59 % predicted  FEV1: 0.60 L     22 % predicted        After administration of bronchodilator   FVC: 2.43 L, 72 % predicted, 21 % change  FEV1: 0.66 L, 25 % predicted, 11 % change     Lung volumes by body plethysmography:   Total Lung Capacity 124 % predicted   Residual volume 243 % predicted     DLCO corrected for patients hemoglobin level: 45 %      Interpretation:     Very severe obstructive airflow defect on spirometry     Significant improvement in airflow or forced vital capacity in response to the administration of bronchodilator per ATS standards.     Air trapping and hyperinflation as indicated by the lung volumes     Moderate decrease in diffusion capacity     Flow-volume loop consistent with obstruction        6 minute walk test   The patient was 95% on room air at rest and resting HR was 95bpm with dyspnea scale of 4/10. He completed 274m of walking with the lowest saturation at 87% requiring 2lpm of oxygen for which the lowest O2 saturation was 90%. End of test HR was 129bpm and dyspnea scale was 9/10.

## 2024-08-05 DIAGNOSIS — J44.9 END STAGE COPD (HCC): ICD-10-CM

## 2024-08-05 RX ORDER — BUDESONIDE 0.5 MG/2ML
INHALANT ORAL
Qty: 360 ML | Refills: 1 | Status: SHIPPED | OUTPATIENT
Start: 2024-08-05

## 2024-08-13 ENCOUNTER — OFFICE VISIT (OUTPATIENT)
Dept: CARDIOLOGY CLINIC | Facility: CLINIC | Age: 69
End: 2024-08-13
Payer: COMMERCIAL

## 2024-08-13 ENCOUNTER — TELEPHONE (OUTPATIENT)
Age: 69
End: 2024-08-13

## 2024-08-13 VITALS
DIASTOLIC BLOOD PRESSURE: 66 MMHG | WEIGHT: 99.6 LBS | HEART RATE: 87 BPM | OXYGEN SATURATION: 100 % | SYSTOLIC BLOOD PRESSURE: 112 MMHG | BODY MASS INDEX: 18.33 KG/M2 | HEIGHT: 62 IN

## 2024-08-13 DIAGNOSIS — J96.11 CHRONIC RESPIRATORY FAILURE WITH HYPOXIA AND HYPERCAPNIA (HCC): ICD-10-CM

## 2024-08-13 DIAGNOSIS — J44.9 END STAGE COPD (HCC): Primary | ICD-10-CM

## 2024-08-13 DIAGNOSIS — J96.12 CHRONIC RESPIRATORY FAILURE WITH HYPOXIA AND HYPERCAPNIA (HCC): ICD-10-CM

## 2024-08-13 DIAGNOSIS — J44.9 END STAGE COPD (HCC): ICD-10-CM

## 2024-08-13 DIAGNOSIS — I25.10 NONOBSTRUCTIVE ATHEROSCLEROSIS OF CORONARY ARTERY: ICD-10-CM

## 2024-08-13 DIAGNOSIS — I50.22 CHRONIC HFREF (HEART FAILURE WITH REDUCED EJECTION FRACTION) (HCC): Primary | ICD-10-CM

## 2024-08-13 DIAGNOSIS — I42.8 NONISCHEMIC CARDIOMYOPATHY (HCC): ICD-10-CM

## 2024-08-13 DIAGNOSIS — I44.7 LEFT BUNDLE-BRANCH BLOCK: Chronic | ICD-10-CM

## 2024-08-13 PROCEDURE — 99214 OFFICE O/P EST MOD 30 MIN: CPT | Performed by: INTERNAL MEDICINE

## 2024-08-13 RX ORDER — IPRATROPIUM BROMIDE AND ALBUTEROL SULFATE 2.5; .5 MG/3ML; MG/3ML
3 SOLUTION RESPIRATORY (INHALATION) 4 TIMES DAILY
Qty: 180 ML | Refills: 5 | Status: SHIPPED | OUTPATIENT
Start: 2024-08-13 | End: 2024-08-21 | Stop reason: SDUPTHER

## 2024-08-13 NOTE — TELEPHONE ENCOUNTER
"Mellisa called asking if we can please have a new script for \"ipratropium-albuterol duo-neb solution\" sent to his [facility] in [facility], as he is currently out. This medication had been taken off his active list when he was in the hospital earlier this year but he states he uses it daily. Please advise    "

## 2024-08-13 NOTE — PROGRESS NOTES
Cardiology Follow Up    Natalio Hartmann Jr.  1955  [id number]  HEART & VASCULAR [facility]  [address]  [place] [address]    1. Chronic HFrEF (heart failure with reduced ejection fraction) ([facility])  Echo complete w/ contrast if indicated      2. Nonischemic cardiomyopathy ([facility])  Echo complete w/ contrast if indicated      3. Left bundle-branch block        4. Nonobstructive atherosclerosis of coronary artery        5. End stage COPD ([facility])        6. Chronic respiratory failure with hypoxia and hypercapnia ([facility])            DISCUSSION:     1.  Chronic hypoxic respiratory failure - This appears to be initially more pulmonary related, as his PFTs do show a significant obstructive pathology and his ejection fraction has improved.  He continues on home oxygen with 4 to 6 L chronically, with oxygen saturations in the high 80s.  Heart failure is now contributing to this as well given his severely reduced systolic function.  His overall long-term prognosis is poor, and pulmonary has had discussions with him regarding palliative care and hospice.  We have discussed this with him in the past as well.     2.  Chronic systolic CHF - This is secondary to a dilated cardiomyopathy and a left bundle branch block.  He is volume overloaded today and was not responding to Lasix.  We changed him to torsemide which she did respond to, but he does need periodic increases.  He is currently on 40 mg daily.  He should follow a low-sodium diet.  I am can have him back for follow-up with the heart failure service in 1 to 2 months.  His stage is difficult given his chronic respiratory failure associated with COPD.  But I am suspicious he is approaching stage D.     3.  Dilated cardiomyopathy - Ejection fractions have hovered around 40% for a few years, but about then in showed a drop down to 25 to 30%.  His most recent echocardiogram from May 2023 showed a drop  down to 20%.  We have tried to goal-directed medical therapy but he has not tolerated this from either an acute kidney injury standpoint or hypotension.  We are very limited with this therapy and we will continue to follow him closely to see if we could really add any of these agents.  We did order an updated echocardiogram today.     4.  CAD - Nonobstructive by cardiac catheterization.  Continue medical management.  Patient is on rosuvastatin and ASA.        Interval History:    Natalio Hartmann Jr. is here for follow-up given his history of a dilated cardiomyopathy and chronic systolic congestive heart failure, at least partially associated with a chronic left bundle branch block.  Over the few years his ejection fraction has been hovering around 40%.  He has had, however, significant lung disease, and follows with Dr. Malia Luque of pulmonary.  Most of his symptoms appeared to be pulmonary related, but given his cardiomyopathy and the symptoms we entertained the idea of a biventricular ICD.  He declined, but I also felt it would provide little clinical benefit.  He follows with pulmonary closely who has been titrating steroid therapy for his lung disease.  Over multiple follow-ups he was not able to be on any goal-directed medical therapy because of some hypotension and his chronic lung disease.  We repeated an echocardiogram and his ejection fraction did improve to the low normal range transiently, at 50%.    In August 2022 Sathya was in the hospital with acute respiratory failure that appeared to be the combination of a COPD exacerbation and mild CHF.  He had an echocardiogram that again showed an ejection fraction around 40%.  He was started on Lasix 40 mg daily as he had not been on diuretics prior.  In follow-up goal-directed medical therapy was attempted with losartan 25 mg daily and spironolactone 12.5 mg daily.  However he developed hypovolemia, acute kidney injury and hypotension.  These agents were stopped  and his Lasix was made every other day.  That in February of this year Natalio was in the hospital with acute respiratory failure associated with pneumonia and a COPD exacerbation.  He did have some heart failure as well and echocardiogram showed a drop in his ejection fraction to 25%, likely stress-induced.  Later that month he repeated an echocardiogram that showed marginal improvement to 30%.  He had an extensive hospitalization and short-term rehabilitation.  Pulmonary recommended consideration for hospice given his severe COPD and respiratory failure.  He continues to follow with pulmonary closely.    Last year we repeated an echocardiogram in May that showed his ejection fraction back down to 20%.  He was following closely with the heart failure service.  In follow-up with he was showing signs of volume overload and significant lower extremity edema.  We changed him over to torsemide 20 mg daily.  His torsemide does transiently get increased when he shows more signs of volume overload and he is currently on 40 mg alternating with 20 mg daily.  He follows with pulmonary closely for his end-stage COPD and chronic lung disease.  He is on 4 to 6 L of oxygen at home chronically with saturations around 88%, and noninvasive ventilation.  We have not been able to titrate medical therapy given low blood pressures and the need for diuretics.  After our last visit I ordered an updated echocardiogram.  He did not get this done as he was in the hospital with respiratory failure associated with his chronic lung disease.    Natalio clinically remains about the same from a cardiac standpoint.  He has deconditioned and has been significantly affected by his chronic lung disease and the inability to do things.  He is essentially wheelchair-bound at this point.  He shows no signs of volume overload today.  No edema.  He has chronic shortness of breath which appears no different compared to the last time I saw him.  He recently had  a CT scan that showed a consolidation/opacity of the left lower lobe and he did go on empiric antibiotics.  He is a repeat CT scan tomorrow.         Patient Active Problem List   Diagnosis    Nonobstructive atherosclerosis of coronary artery    Nonischemic cardiomyopathy (HCC)    Left bundle-branch block    Obstructive sleep apnea    GERD (gastroesophageal reflux disease)    Prediabetes    Osteoarthritis    Osteoporosis    Vitamin D deficiency    Mood disorder (HCC)    Other insomnia    Macrocytosis without anemia    Chronic respiratory failure with hypoxia and hypercapnia ([facility])    Goals of care, counseling/discussion    BPH with obstruction/lower urinary tract symptoms    Prostate cancer (HCC)    Pulmonary nodules/lesions, multiple    Chronic HFrEF (heart failure with reduced ejection fraction) (HCC)    Chronic anemia    SIRS (systemic inflammatory response syndrome)    Hyponatremia    Physical deconditioning    Steroid-induced hyperglycemia    COPD exacerbation (HCC)    Hyperlipidemia    Malnutrition (HCC)    Alkalosis    Palliative care patient    Chronic hypercapnia/KEVIN    Metabolic encephalopathy    Pulmonary cachexia due to COPD (HCC)    History of bladder cancer    Severe protein-calorie malnutrition (HCC)    Dependence on respirator (ventilator) status (HCC)    End stage COPD (HCC)    Iron deficiency anemia secondary to inadequate dietary iron intake    SIADH (syndrome of inappropriate ADH production) (HCC)    Type 2 diabetes mellitus without complication, with long-term current use of insulin (HCC)     Past Medical History:   Diagnosis Date    Acute metabolic encephalopathy 03/29/2022    Arthritis     Bladder mass     Cardiomyopathy (HCC)     Chest pain     COPD (chronic obstructive pulmonary disease) ([facility])     COVID-19 virus infection 02/23/2023    CPAP (continuous positive airway pressure) dependence     Emphysema of lung (HCC)     Hypoxia     nocturnal    Left bundle branch block     Multiple pulmonary  nodules     last assessed: 10/12/16    Pneumonia     Sleep apnea, obstructive     Smoker     Weight loss 2019     Social History     Socioeconomic History    Marital status: /Civil Union     Spouse name: Not on file    Number of children: Not on file    Years of education: Not on file    Highest education level: Not on file   Occupational History    Not on file   Tobacco Use    Smoking status: Former     Current packs/day: 0.00     Average packs/day: 2.5 packs/day for 42.0 years (105.0 ttl pk-yrs)     Types: Cigarettes     Start date:      Quit date:      Years since quittin.6    Smokeless tobacco: Former    Tobacco comments:     1 ppd for 37 years, 2010 down to 5 cigs a day, is around second hand smoke   Vaping Use    Vaping status: Never Used   Substance and Sexual Activity    Alcohol use: Not Currently     Comment: rarely    Drug use: No    Sexual activity: Not Currently     Partners: Female   Other Topics Concern    Not on file   Social History Narrative    Daily coffee consumption: 8 or more cups a day        Used to work in [employer].  On disability.     Social Determinants of Health     Financial Resource Strain: Not on file   Food Insecurity: No Food Insecurity (2024)    Hunger Vital Sign     Worried About Running Out of Food in the Last Year: Never true     Ran Out of Food in the Last Year: Never true   Transportation Needs: No Transportation Needs (2024)    PRAPARE - Transportation     Lack of Transportation (Medical): No     Lack of Transportation (Non-Medical): No   Physical Activity: Not on file   Stress: Not on file   Social Connections: Not on file   Intimate Partner Violence: Not on file   Housing Stability: Low Risk  (2024)    Housing Stability Vital Sign     Unable to Pay for Housing in the Last Year: No     Number of Times Moved in the Last Year: 1     Homeless in the Last Year: No      Family History   Problem Relation Age of Onset    Diabetes  Mother      Past Surgical History:   Procedure Laterality Date    COLONOSCOPY      CYSTOSCOPY      CYSTOSCOPY  02/17/2021    WI BLADDER INSTILLATION ANTICARCINOGENIC AGENT N/A 1/3/2020    Procedure: INSTILLATION MITOMYCIN;  Surgeon: Sundeep Canchola MD;  Location: [facility];  Service: Urology    WI CYSTO W/REMOVAL OF LESIONS SMALL N/A 1/3/2020    Procedure: TRANSURETHRAL RESECTION OF BLADDER TUMOR (TURBT);  Surgeon: Sundeep Canchola MD;  Location: [facility];  Service: Urology    WI CYSTOURETHROSCOPY WITH BIOPSY N/A 4/13/2021    Procedure: CYSTOSCOPY WITH BIOPSIES;  Surgeon: Sundeep Canchola MD;  Location: [facility];  Service: Urology    WI TRURL ELECTROSURG RESCJ PROSTATE BLEED COMPLETE N/A 4/13/2021    Procedure: TRANSURETHRAL RESECTION OF PROSTATE (TURP) BLADDER BIOPSY, FULGURATION;  Surgeon: Sundeep Canchola MD;  Location: [facility];  Service: Urology       Current Outpatient Medications:     albuterol (PROVENTIL HFA,VENTOLIN HFA) 90 mcg/act inhaler, Inhale 2 puffs every 6 (six) hours as needed for wheezing or shortness of breath, Disp: 18 g, Rfl: 6    budesonide (PULMICORT) 0.5 mg/2 mL nebulizer solution, TAKE 2 ML (0.5 MG TOTAL) BY NEBULIZATION TWICE A DAY RINSE MOUTH AFTER USE, Disp: 360 mL, Rfl: 1    CALCIUM PO, Take by mouth, Disp: , Rfl:     ferrous sulfate 324 (65 Fe) mg, Take 1 tablet (324 mg total) by mouth daily before breakfast, Disp: 90 tablet, Rfl: 1    folic acid (FOLVITE) 400 mcg tablet, Take 1 tablet (400 mcg total) by mouth daily, Disp: 90 tablet, Rfl: 1    ipratropium-albuterol (DUO-NEB) 0.5-2.5 mg/3 mL nebulizer solution, Take 3 mL by nebulization 4 (four) times a day, Disp: 180 mL, Rfl: 5    Nebulizers (InnoSpire Essence Nebulizer) MISC, Use as directed, Disp: , Rfl:     predniSONE 10 mg tablet, TAKE 1 TABLET BY MOUTH EVERY DAY, Disp: 30 tablet, Rfl: 2    predniSONE 10 mg tablet, 4 tabsx 5 days, Disp: 20 tablet, Rfl: 0    Torsemide 40 MG TABS, Take 40 mg by mouth in the morning Take  "torsemide 20 mg daily alternating with 40 mg daily, Disp: , Rfl:     formoterol (PERFOROMIST) 20 MCG/2ML nebulizer solution, Take 2 mL (20 mcg total) by nebulization 2 (two) times a day (Patient not taking: Reported on 7/29/2024), Disp: 120 mL, Rfl: 6  No Known Allergies    Labs:  Lab Results   Component Value Date     08/17/2015    K 3.7 05/13/2024    K 3.9 08/17/2015    CL 84 (L) 05/13/2024     (H) 08/17/2015    CO2 >45 (H) 05/13/2024    CO2 30 08/17/2015    BUN 24 05/13/2024    BUN 13 08/17/2015    CREATININE 1.02 05/13/2024    CREATININE 1.04 08/17/2015    GLUCOSE 90 08/17/2015    CALCIUM 8.8 05/13/2024    CALCIUM 8.8 08/17/2015     Imaging:  ECG today reveals sinus rhythm with an incomplete left bundle branch block.    ECHO (2/21/2023):    Left Ventricle: Left ventricular cavity size is dilated (LVIDd of 6.6 cm).. Wall thickness is normal. The left ventricular ejection fraction is 30%. Systolic function is severely reduced. There is severe global hypokinesis. Diastolic function is mildly abnormal, consistent with grade I (abnormal) relaxation.    Right Ventricle: Right ventricular cavity size is normal. Systolic function is normal.    Tricuspid Valve: There is mild regurgitation.      Review of Systems:  Review of Systems   Constitutional:  Positive for fatigue.   HENT: Negative.     Eyes: Negative.    Respiratory:  Positive for shortness of breath.    Cardiovascular:  Positive for palpitations.   Gastrointestinal: Negative.    Musculoskeletal: Negative.    Skin: Negative.    Allergic/Immunologic: Negative.    Neurological: Negative.    Hematological: Negative.    Psychiatric/Behavioral: Negative.     All other systems reviewed and are negative.    Vitals:    08/13/24 1146   BP: 112/66   BP Location: Right arm   Patient Position: Sitting   Cuff Size: Standard   Pulse: 87   SpO2: 100%   Weight: 45.2 kg (99 lb 9.6 oz)   Height: 5' 2\" (1.575 m)     Physical Exam  Vitals and nursing note reviewed. "   Constitutional:       Appearance: He is well-developed.   HENT:      Head: Normocephalic and atraumatic.   Eyes:      General: No scleral icterus.        Right eye: No discharge.         Left eye: No discharge.      Pupils: Pupils are equal, round, and reactive to light.   Neck:      Thyroid: No thyromegaly.      Vascular: No JVD.   Cardiovascular:      Rate and Rhythm: Normal rate and regular rhythm. No extrasystoles are present.     Pulses: Normal pulses.      Heart sounds: S1 normal and S2 normal. Heart sounds are distant. No murmur heard.     No friction rub. No gallop.   Pulmonary:      Effort: Pulmonary effort is normal. No respiratory distress.      Breath sounds: Decreased breath sounds present. No wheezing or rales.   Abdominal:      General: Bowel sounds are normal. There is no distension.      Palpations: Abdomen is soft.      Tenderness: There is no abdominal tenderness.   Musculoskeletal:         General: No tenderness or deformity. Normal range of motion.      Cervical back: Normal range of motion and neck supple.   Skin:     General: Skin is warm and dry.      Findings: No rash.   Neurological:      Mental Status: He is alert and oriented to person, place, and time.      Cranial Nerves: No cranial nerve deficit.   Psychiatric:         Thought Content: Thought content normal.         Judgment: Judgment normal.       Counseling / Coordination of Care  Total office time spent today 25 minutes.  Greater than 50% of total time was spent with the patient and / or family counseling and / or coordination of care.

## 2024-08-13 NOTE — PATIENT INSTRUCTIONS
"Patient Education     Low-sodium diet   The Basics   Written by the doctors and editors at [facility]   What is sodium? -- This is the main ingredient in table salt. It is also found in lots of foods. The body needs a very small amount of sodium to work normally, but most people eat much more sodium than their body needs.  Who should eat less sodium? -- Nearly everyone eats too much sodium. The average American takes in 3400 milligrams of sodium each day. Experts say that most people should have no more than 2300 milligrams a day.  Some people with certain health conditions should follow a low-sodium diet. Ask your doctor how much sodium you should have.  Why should I eat less sodium? -- Reducing the amount of sodium you eat can have lots of health benefits:   It can lower your blood pressure. This means that it can help lower your risk of stroke, heart attack, kidney damage, and lots of other health problems.   It can reduce the amount of fluid in your body, which means that your heart doesn't have to work as hard.   It can keep the kidneys from having to work too hard. This is especially important in people who have kidney disease.   It can reduce swelling in the ankles and belly, which can be uncomfortable and make it hard to move.   It can lower the chances of forming kidney stones.   It can help keep your bones strong.  Which foods have the most sodium? -- Processed foods have the most sodium. These foods usually come in cans, boxes, jars, and bags. They tend to have a lot of sodium even if they don't taste salty. In fact, many sweet foods have a lot of sodium in them. The only way to know for sure how much sodium is in a food is to check the label (figure 1).  Here are some examples of foods that often have too much sodium:   Canned soups   Rice and noodle mixes   Sauces, dressings, and condiments (such as ketchup and mustard)   Pre-made frozen meals (also called \"TV dinners\")   Deli meats, hot dogs, and " "cheeses   Smoked, cured, or pickled foods   Salted snack foods and nuts   Restaurant meals  What should I do to reduce the amount of sodium in my diet? -- Many people think that eating a low-sodium diet just means not adding salt to their food. But this is not true. Not adding salt at the table or when cooking will help a little. But almost all of the sodium you eat is already in the food you buy at the grocery store or at restaurants (figure 2).  Here are some tips to help you eat less sodium:   Avoid processed foods when possible. This is the most important thing you can do to eat less sodium. Processed foods include most foods that are sold in cans, boxes, jars, and bags.   Instead of buying pre-made, processed foods, buy fresh or fresh-frozen fruits and vegetables. (\"Fresh-frozen\" means that the food is frozen without anything added to it.)   Buy meats, fish, chicken, and turkey that are fresh instead of canned or sold at the deli counter. (Meats sold at the deli counter are high in sodium.)   Try to eat at restaurants less often.   When possible, try to make meals from scratch at home using fresh ingredients.   If you do buy canned or packaged foods, choose ones that are labeled \"sodium free\" or \"very low sodium\" (table 1). Or choose foods that have less than 400 milligrams of sodium in each serving. The amount of sodium in each serving appears on the nutrition label (figure 1).  The table has some examples of foods to avoid and foods to choose instead (table 2).  Whatever changes you make, make them slowly. Choose 1 thing to do differently, and do that for a while. If you can keep doing that change easily, add another change. For instance, if you usually eat green beans from a can, try buying fresh or fresh-frozen green beans and cooking them at home without adding salt. If that works for you, keep doing it. Then, choose another thing to change.  If you try making a change and it doesn't work right away, don't " "give up. See if you can reduce sodium in other ways. The important thing is to take small steps and to keep doing the changes that work for you.  Can I still eat out at restaurants sometimes? -- One of best ways to limit your sodium is to only eat out at restaurants every once in a while. When you do eat out, try to choose places that offer healthier choices and fresh ingredients.  No matter where you eat, when choosing your food:   Ask your  if your meal can be made without salt.   Avoid foods that come with sauces or dips.   Choose plain grilled meats or fish and steamed vegetables.   Ask for oil and vinegar for your salad, rather than dressing.   If a meal you really want has more sodium than you should have, consider saving half of it to eat another day.  What if food just does not taste as good without sodium? -- Starting a low-sodium diet can be hard. The good news is that your taste buds can get used to having less sodium. But you have to give them some time to adjust.  It can also help to try other ways to add flavor to your foods. Try things like herbs, spices, lemon juice, and vinegar.  What about salt substitutes? -- Flavoring your food with a salt substitute is a good way to reduce how much salt you eat. But check with your doctor or nurse before trying this. Some salt substitutes can be dangerous if you have certain health problems or take certain medicines.  Do medicines have sodium? -- Yes, some medicines contain sodium. If you are buying medicines you can get without a prescription, look to see how much sodium they have. Avoid products that have \"sodium carbonate\" or \"sodium bicarbonate\" unless your doctor prescribes them. (Sodium bicarbonate is baking soda.)  All topics are updated as new evidence becomes available and our peer review process is complete.  This topic retrieved from [employer] on: Mar 22, 2024.  Topic 72198 Version 14.0  Release: 32.2.4 - C32.80  © 2024 Up[employer], Inc. and/or its " "affiliates. All rights reserved.  figure 1: Food labels can be tricky     To figure out how much sodium you are eating, check the label to find out how much sodium is in 1 serving. If you are having more than 1 serving, multiply that amount by the number of servings you plan to eat. For instance, if you are going to eat this whole can of soup, you should multiply 850 by 2. That means that you will be having 1700 milligrams of sodium. That's more sodium than many people are supposed to have in 1 day.  Graphic 43404 Version 8.0  figure 2: Sources of sodium in your diet     Graphic 79233 Version 2.0  table 1: A guide to common nutrient claims and what they mean  Salt/sodium-free  Less than 5 mg of sodium per serving   Very low sodium  35 mg or less of sodium per serving   Low sodium  140 mg or less of sodium per serving   Reduced sodium  At least 25% less sodium than the regular product   Light or lite in sodium  At least 50% less sodium than the regular product   No salt added or unsalted  No salt is added during processing, but these products may not be salt/sodium-free unless stated   mg: milligram; %: percent.  Graphic 25411 Version 7.0  table 2: Ways to cut down on salt (sodium)  Avoid these foods  Try these foods instead    Cured and smoked foods like jean, sausage, smoked fish and meats, hot dogs, ham, lunch meats, corned beef, and pickles Fresh turkey, chicken, and lean beef   Canned fish (tuna, sardines) Unsalted tuna or sardines   Canned meats Fresh unprocessed meats, vegetable protein, and fish  Frozen and canned meats, vegetable protein, and fish that are labeled \"low-sodium\"   Salted pretzels, crackers, potato chips, tortilla chips, and nuts Low-sodium and unsalted versions of these foods   Most cheeses Low-sodium cheeses (check label for actual sodium content)   Sauces (tomato and cream, etc), tomato juices Low-sodium versions of these foods, such as low-sodium tomato juice   Processed, instant, and " "convenience foods like frozen dinners, packaged meals, canned soups, and boxed pasta blends Cook and freeze your own low-sodium meals, soups, and broths    If you do need to use convenience or processed foods, read the labels. Choose items with 140 to 200 mg of sodium per serving.    For an entire convenience meal (frozen dinner), try to find options with less than 500 to 600 mg of sodium.    If you used canned foods, look for those labeled \"sodium-free.\" Or you can rinse the canned food under water to lower the sodium content.   Fast foods and foods prepared at restaurants (unless without cheese, sauces, or added salt) Fresh foods and foods with sauces on the side  Request that food be prepared without cheese or added salt   Graphic 59124 Version 10.0  Consumer Information Use and Disclaimer   Disclaimer: This generalized information is a limited summary of diagnosis, treatment, and/or medication information. It is not meant to be comprehensive and should be used as a tool to help the user understand and/or assess potential diagnostic and treatment options. It does NOT include all information about conditions, treatments, medications, side effects, or risks that may apply to a specific patient. It is not intended to be medical advice or a substitute for the medical advice, diagnosis, or treatment of a health care provider based on the health care provider's examination and assessment of a patient's specific and unique circumstances. Patients must speak with a health care provider for complete information about their health, medical questions, and treatment options, including any risks or benefits regarding use of medications. This information does not endorse any treatments or medications as safe, effective, or approved for treating a specific patient. UpToDate, Inc. and its affiliates disclaim any warranty or liability relating to this information or the use thereof.The use of this information is governed by the " Terms of Use, available at https://www.woltersPearltreesuwer.com/en/know/clinical-effectiveness-terms. 2024© [employer] and its affiliates and/or licensors. All rights reserved.  Copyright   © 2024 [employer] and/or its affiliates. All rights reserved.

## 2024-08-20 ENCOUNTER — HOSPITAL ENCOUNTER (OUTPATIENT)
Dept: RADIOLOGY | Facility: HOSPITAL | Age: 69
Discharge: HOME | End: 2024-08-20
Payer: COMMERCIAL

## 2024-08-20 DIAGNOSIS — R91.8 PULMONARY NODULES/LESIONS, MULTIPLE: ICD-10-CM

## 2024-08-20 PROCEDURE — 71250 CT THORAX DX C-: CPT

## 2024-08-20 NOTE — TELEPHONE ENCOUNTER
Patient's wife is calling because the patientdoes not have enough medication it was only enough for 15 days since the patient takes it 4xs a day the ipitropium duoneb and the rescue inhaler can we please place an order with the [facility] in [place] on [address] please advise wife when done thank you

## 2024-08-21 DIAGNOSIS — J44.9 END STAGE COPD (HCC): ICD-10-CM

## 2024-08-21 RX ORDER — IPRATROPIUM BROMIDE AND ALBUTEROL SULFATE 2.5; .5 MG/3ML; MG/3ML
3 SOLUTION RESPIRATORY (INHALATION) 4 TIMES DAILY
Qty: 360 ML | Refills: 5 | Status: SHIPPED | OUTPATIENT
Start: 2024-08-21

## 2024-08-26 ENCOUNTER — TELEPHONE (OUTPATIENT)
Age: 69
End: 2024-08-26

## 2024-08-26 RX ORDER — TORSEMIDE 20 MG/1
TABLET ORAL
Qty: 90 TABLET | Refills: 3 | Status: CANCELLED | OUTPATIENT
Start: 2024-08-26

## 2024-08-26 NOTE — TELEPHONE ENCOUNTER
Medication: Torsemide 40 MG TABS     Dose/Frequency: Take 40 mg by mouth in the morning Take torsemide 20 mg daily alternating with 40 mg daily     Quantity: 90     Pharmacy: [facility]/pharmacy #[id number] - [place], [place] - [address]     Office:   [] PCP/Provider -   [x] Speciality/Provider - Dr. Funez    Does the patient have enough for 3 days?   [] Yes   [x] No - Send as HP to POD       Patient would like a call based on medication change. Toresmide went from 40 mg to 20 mg it appears due to insurance. Patient is confused and would like explanation from clinical team.

## 2024-08-26 NOTE — TELEPHONE ENCOUNTER
Mellisa called in regards to her  CT . Mellisa has not heard back in regards to the results . Please advise Mellisa.

## 2024-08-26 NOTE — TELEPHONE ENCOUNTER
Spoke with patient's wife, has been trying to get a refill on Torsemide today, order still has not gone through and is very frustrated. Patient takes alternating doses of 20mg and 40mg; he is completley out at this time.    Please send a 90 day supply to [facility]. Refill request has also been submitted.

## 2024-08-26 NOTE — TELEPHONE ENCOUNTER
Requested medication(s) are due for refill today: y  Patient has already received a courtesy refill: n  Other reason request has been forwarded to provider:

## 2024-08-26 NOTE — TELEPHONE ENCOUNTER
Spoke with Mellisa to inform of Nazanin's message. Offered to schedule f/u appt. Pt would like to see Dr. Luque again. He is due for appt around end of October.

## 2024-08-26 NOTE — TELEPHONE ENCOUNTER
Please let Mellisa and Natalio know that the lung nodule that was concerning has resolved and was likely inflammatory. His other lung nodule is stable. A repeat CT chest can be considered for one year at his next visit. He should be seen three months from his last visit. Please also coordinate this appointment. Thank you.

## 2024-08-27 NOTE — TELEPHONE ENCOUNTER
Wife is calling to see if there is any update on this medication request? Please give her a call at your earliest convenience. Thanks    [phone]

## 2024-08-28 DIAGNOSIS — J44.9 END STAGE COPD (HCC): ICD-10-CM

## 2024-08-28 DIAGNOSIS — I50.22 CHRONIC HFREF (HEART FAILURE WITH REDUCED EJECTION FRACTION) (HCC): Primary | ICD-10-CM

## 2024-08-28 RX ORDER — TORSEMIDE 20 MG/1
40 TABLET ORAL DAILY
Qty: 90 TABLET | Refills: 3 | Status: SHIPPED | OUTPATIENT
Start: 2024-08-28

## 2024-08-28 RX ORDER — BUDESONIDE 0.5 MG/2ML
INHALANT ORAL
Qty: 1080 ML | Refills: 1 | Status: SHIPPED | OUTPATIENT
Start: 2024-08-28

## 2024-08-28 NOTE — TELEPHONE ENCOUNTER
Spoke to pts wife, put a new script for torsemide. Sent directly to Dr. CHELSEA Funez to fill. Advised pts wife.

## 2024-09-06 ENCOUNTER — HOSPITAL ENCOUNTER (OUTPATIENT)
Dept: NON INVASIVE DIAGNOSTICS | Facility: CLINIC | Age: 69
Discharge: HOME | End: 2024-09-06
Payer: COMMERCIAL

## 2024-09-06 VITALS
BODY MASS INDEX: 18.22 KG/M2 | WEIGHT: 99 LBS | HEIGHT: 62 IN | SYSTOLIC BLOOD PRESSURE: 112 MMHG | DIASTOLIC BLOOD PRESSURE: 66 MMHG | HEART RATE: 88 BPM

## 2024-09-06 DIAGNOSIS — I42.8 NONISCHEMIC CARDIOMYOPATHY (HCC): ICD-10-CM

## 2024-09-06 DIAGNOSIS — I50.22 CHRONIC HFREF (HEART FAILURE WITH REDUCED EJECTION FRACTION) (HCC): ICD-10-CM

## 2024-09-06 LAB
AORTIC ROOT: 3.8 CM
AORTIC VALVE MEAN VELOCITY: 8.4 M/S
APICAL FOUR CHAMBER EJECTION FRACTION: 32 %
ASCENDING AORTA: 3.6 CM
AV AREA BY CONTINUOUS VTI: 2.2 CM2
AV AREA PEAK VELOCITY: 2.4 CM2
AV LVOT MEAN GRADIENT: 1 MMHG
AV LVOT PEAK GRADIENT: 3 MMHG
AV MEAN GRADIENT: 3 MMHG
AV PEAK GRADIENT: 5 MMHG
AV VALVE AREA: 2.21 CM2
AV VELOCITY RATIO: 0.7
BSA FOR ECHO PROCEDURE: 1.42 M2
DOP CALC AO PEAK VEL: 1.16 M/S
DOP CALC AO VTI: 20.11 CM
DOP CALC LVOT AREA: 3.46 CM2
DOP CALC LVOT CARDIAC INDEX: 3.02 L/MIN/M2
DOP CALC LVOT CARDIAC OUTPUT: 4.29 L/MIN
DOP CALC LVOT DIAMETER: 2.1 CM
DOP CALC LVOT PEAK VEL VTI: 12.84 CM
DOP CALC LVOT PEAK VEL: 0.81 M/S
DOP CALC LVOT STROKE INDEX: 33.1 ML/M2
DOP CALC LVOT STROKE VOLUME: 47
FRACTIONAL SHORTENING: 5 (ref 28–44)
INTERVENTRICULAR SEPTUM IN DIASTOLE (PARASTERNAL SHORT AXIS VIEW): 1 CM
INTERVENTRICULAR SEPTUM: 1 CM (ref 0.6–1.1)
LAAS-AP2: 24.7 CM2
LAAS-AP4: 19.2 CM2
LEFT ATRIUM SIZE: 3.7 CM
LEFT ATRIUM VOLUME (MOD BIPLANE): 74 ML
LEFT ATRIUM VOLUME INDEX (MOD BIPLANE): 52.1 ML/M2
LEFT INTERNAL DIMENSION IN SYSTOLE: 5.4 CM (ref 2.1–4)
LEFT VENTRICLE DIASTOLIC VOLUME (MOD BIPLANE): 207 ML
LEFT VENTRICLE DIASTOLIC VOLUME INDEX (MOD BIPLANE): 145.8 ML/M2
LEFT VENTRICLE SYSTOLIC VOLUME (MOD BIPLANE): 144 ML
LEFT VENTRICLE SYSTOLIC VOLUME INDEX (MOD BIPLANE): 101.4 ML/M2
LEFT VENTRICULAR INTERNAL DIMENSION IN DIASTOLE: 5.7 CM (ref 3.5–6)
LEFT VENTRICULAR POSTERIOR WALL IN END DIASTOLE: 1 CM
LEFT VENTRICULAR STROKE VOLUME: 24 ML
LV EF: 30 %
LVSV (TEICH): 24 ML
RIGHT ATRIUM AREA SYSTOLE A4C: 14.8 CM2
RIGHT VENTRICLE ID DIMENSION: 4.5 CM
SINOTUBULAR JUNCTION: 3.3 CM
SL CV LEFT ATRIUM LENGTH A2C: 5.7 CM
SL CV LV EF: 25
SL CV PED ECHO LEFT VENTRICLE DIASTOLIC VOLUME (MOD BIPLANE) 2D: 163 ML
SL CV PED ECHO LEFT VENTRICLE SYSTOLIC VOLUME (MOD BIPLANE) 2D: 139 ML
SL CV SINUS OF VALSALVA 2D: 3.9 CM
STJ: 3.3 CM
TR MAX PG: 23 MMHG
TR PEAK VELOCITY: 2.4 M/S
TRICUSPID ANNULAR PLANE SYSTOLIC EXCURSION: 2 CM
TRICUSPID VALVE PEAK REGURGITATION VELOCITY: 2.41 M/S

## 2024-09-06 PROCEDURE — 93306 TTE W/DOPPLER COMPLETE: CPT

## 2024-09-06 PROCEDURE — 93306 TTE W/DOPPLER COMPLETE: CPT | Performed by: INTERNAL MEDICINE

## 2024-09-08 DIAGNOSIS — J44.9 END STAGE COPD (HCC): ICD-10-CM

## 2024-09-09 RX ORDER — PREDNISONE 10 MG/1
10 TABLET ORAL DAILY
Qty: 30 TABLET | Refills: 2 | Status: SHIPPED | OUTPATIENT
Start: 2024-09-09

## 2024-09-16 ENCOUNTER — TELEPHONE (OUTPATIENT)
Age: 69
End: 2024-09-16

## 2024-09-19 ENCOUNTER — TELEPHONE (OUTPATIENT)
Age: 69
End: 2024-09-19

## 2024-09-19 DIAGNOSIS — I50.22 CHRONIC HFREF (HEART FAILURE WITH REDUCED EJECTION FRACTION) (HCC): ICD-10-CM

## 2024-09-19 RX ORDER — TORSEMIDE 20 MG/1
40 TABLET ORAL DAILY
Qty: 180 TABLET | Refills: 1 | Status: SHIPPED | OUTPATIENT
Start: 2024-09-19

## 2024-09-19 NOTE — TELEPHONE ENCOUNTER
Spoke to pt's wife who stated she will talk to patient.  Patient is currently undergoing some health issues.  He will call if he wants to schedule.

## 2024-10-14 ENCOUNTER — TELEPHONE (OUTPATIENT)
Age: 69
End: 2024-10-14

## 2024-10-14 NOTE — TELEPHONE ENCOUNTER
[facility] - [place], [place] is calling requesting the last office note be faxed    (Fax) [phone]

## 2024-10-17 ENCOUNTER — TELEPHONE (OUTPATIENT)
Age: 69
End: 2024-10-17

## 2024-10-17 NOTE — TELEPHONE ENCOUNTER
Pt stated Pulm Provider: Dr. Luque    Actionable item: Asking for a blood gas test/advice    What is the reason for the call/chief complaint?    Started about 2 days ago, but oxygen wasn't dropping but yesterday on it has been.Oxygen when gets off BiPaP is low. He is breathing a bit harder.  Congested, but unable to bring up any phlegm. Very tired. When this has happened in the past before he had his arterial blood gases checked and it would be high with CO2. Once his Osat hit 73, but it has been around 93. No wheezing, no shortness of breath, no chest pain.     Priority:  Moderate

## 2024-10-18 DIAGNOSIS — J96.12 CHRONIC RESPIRATORY FAILURE WITH HYPOXIA AND HYPERCAPNIA (HCC): ICD-10-CM

## 2024-10-18 DIAGNOSIS — G47.33 OBSTRUCTIVE SLEEP APNEA: ICD-10-CM

## 2024-10-18 DIAGNOSIS — J96.11 CHRONIC RESPIRATORY FAILURE WITH HYPOXIA AND HYPERCAPNIA (HCC): ICD-10-CM

## 2024-10-18 DIAGNOSIS — J44.9 END STAGE COPD (HCC): Primary | ICD-10-CM

## 2024-10-18 RX ORDER — PREDNISONE 10 MG/1
TABLET ORAL
Qty: 30 TABLET | Refills: 0 | Status: SHIPPED | OUTPATIENT
Start: 2024-10-18 | End: 2024-10-28

## 2024-10-19 ENCOUNTER — APPOINTMENT (OUTPATIENT)
Dept: RADIOLOGY | Facility: HOSPITAL | Age: 69
End: 2024-10-19
Payer: COMMERCIAL

## 2024-10-19 ENCOUNTER — HOSPITAL ENCOUNTER (OUTPATIENT)
Dept: RADIOLOGY | Facility: HOSPITAL | Age: 69
Discharge: HOME | End: 2024-10-19
Payer: COMMERCIAL

## 2024-10-19 DIAGNOSIS — J44.9 END STAGE COPD (HCC): ICD-10-CM

## 2024-10-19 PROCEDURE — 71046 X-RAY EXAM CHEST 2 VIEWS: CPT

## 2024-10-23 ENCOUNTER — HOSPITAL ENCOUNTER (OUTPATIENT)
Dept: PULMONOLOGY | Facility: HOSPITAL | Age: 69
Discharge: HOME | End: 2024-10-23
Payer: COMMERCIAL

## 2024-10-23 ENCOUNTER — TELEPHONE (OUTPATIENT)
Age: 69
End: 2024-10-23

## 2024-10-23 DIAGNOSIS — G47.33 OBSTRUCTIVE SLEEP APNEA: ICD-10-CM

## 2024-10-23 DIAGNOSIS — J96.12 CHRONIC RESPIRATORY FAILURE WITH HYPOXIA AND HYPERCAPNIA (HCC): ICD-10-CM

## 2024-10-23 DIAGNOSIS — J96.11 CHRONIC RESPIRATORY FAILURE WITH HYPOXIA AND HYPERCAPNIA (HCC): ICD-10-CM

## 2024-10-23 LAB
BASE EXCESS BLDA CALC-SCNC: 16 MMOL/L (ref -2–3)
CA-I BLD-SCNC: 1.15 MMOL/L (ref 1.12–1.32)
FIO2 GAS DIL.REBREATH: 40 L
GLUCOSE SERPL-MCNC: 163 MG/DL (ref 65–140)
HCO3 BLDA-SCNC: 45.8 MMOL/L (ref 22–28)
HCT VFR BLD CALC: 39 % (ref 36.5–49.3)
HGB BLDA-MCNC: 13.3 G/DL (ref 12–17)
PCO2 BLD: 83.8 MM HG (ref 36–44)
PCO2 BLD: >45 MMOL/L (ref 21–32)
PH BLD: 7.34 [PH] (ref 7.35–7.45)
PO2 BLD: 97 MM HG (ref 75–129)
POTASSIUM BLD-SCNC: 3.5 MMOL/L (ref 3.5–5.3)
SAO2 % BLD FROM PO2: 97 % (ref 60–85)
SODIUM BLD-SCNC: 138 MMOL/L (ref 136–145)
SPECIMEN SOURCE: ABNORMAL

## 2024-10-23 PROCEDURE — 36600 WITHDRAWAL OF ARTERIAL BLOOD: CPT

## 2024-10-23 PROCEDURE — 82330 ASSAY OF CALCIUM: CPT

## 2024-10-23 PROCEDURE — 85014 HEMATOCRIT: CPT

## 2024-10-23 PROCEDURE — 84295 ASSAY OF SERUM SODIUM: CPT

## 2024-10-23 PROCEDURE — 84132 ASSAY OF SERUM POTASSIUM: CPT

## 2024-10-23 PROCEDURE — 82803 BLOOD GASES ANY COMBINATION: CPT

## 2024-10-23 PROCEDURE — 82947 ASSAY GLUCOSE BLOOD QUANT: CPT

## 2024-10-23 NOTE — TELEPHONE ENCOUNTER
Patient's wife calling requesting results of chest x-ray and blood gas.    Patient's wife states patient is congested and wants to make sure he doesn't need an antibiotic

## 2024-10-28 DIAGNOSIS — J44.9 END STAGE COPD (HCC): ICD-10-CM

## 2024-10-28 RX ORDER — PREDNISONE 10 MG/1
10 TABLET ORAL DAILY
Qty: 30 TABLET | Refills: 2 | Status: SHIPPED | OUTPATIENT
Start: 2024-10-28

## 2024-11-19 ENCOUNTER — OFFICE VISIT (OUTPATIENT)
Dept: PULMONOLOGY | Facility: CLINIC | Age: 69
End: 2024-11-19
Payer: COMMERCIAL

## 2024-11-19 VITALS
BODY MASS INDEX: 18.11 KG/M2 | HEART RATE: 56 BPM | HEIGHT: 62 IN | SYSTOLIC BLOOD PRESSURE: 110 MMHG | OXYGEN SATURATION: 99 % | DIASTOLIC BLOOD PRESSURE: 70 MMHG | TEMPERATURE: 98.8 F

## 2024-11-19 DIAGNOSIS — J96.12 CHRONIC RESPIRATORY FAILURE WITH HYPOXIA AND HYPERCAPNIA (HCC): ICD-10-CM

## 2024-11-19 DIAGNOSIS — R64 PULMONARY CACHEXIA DUE TO COPD (HCC): ICD-10-CM

## 2024-11-19 DIAGNOSIS — J44.9 PULMONARY CACHEXIA DUE TO COPD (HCC): ICD-10-CM

## 2024-11-19 DIAGNOSIS — J44.9 END STAGE COPD (HCC): ICD-10-CM

## 2024-11-19 DIAGNOSIS — I42.8 NONISCHEMIC CARDIOMYOPATHY (HCC): Primary | ICD-10-CM

## 2024-11-19 DIAGNOSIS — J96.11 CHRONIC RESPIRATORY FAILURE WITH HYPOXIA AND HYPERCAPNIA (HCC): ICD-10-CM

## 2024-11-19 PROCEDURE — 99214 OFFICE O/P EST MOD 30 MIN: CPT | Performed by: INTERNAL MEDICINE

## 2024-11-21 NOTE — ASSESSMENT & PLAN NOTE
Discussed the importance of adding protein drinks/calorie intake                                BMI Findings:           Body mass index is 18.11 kg/m².

## 2024-11-21 NOTE — ASSESSMENT & PLAN NOTE
He has had a decrease in EF on most recent echo  Does not appear to be volume overloaded on exam today  He should remain on demadex per cardiology recommendations

## 2024-11-21 NOTE — PROGRESS NOTES
"  Brief CVTS Progress Note  07/30/2022     Sofie Rodriguez is a 62 year old female with PMH significant for oxygen-dependent COPD, HF, HTN, HCV, osteopenia, and methamphetamine abuse in remission.  She is now s/p BSLT by Dr. Sunshine on 6/28/2022.  Her post-operative course has been complicated by LUE critical limb ischemia now s/p left radial thrombectomy on 7/1/2022, hypercapneic respiratory insufficiency, BACILIO, and dysphagia.     A/P:  S/p BLST on 6/28/2022, post op course c/b LUE critical limb ischemia now s/p left radial thrombectomy on 7/1/2022, hypercapneic respiratory insufficiency, BACILIO, and dysphagia.    -Hgb<7-- I would give one PRBC     - Chest tube relatively low, only 0-20 per shift from the right and less than that from the left, but.... the left apical tube may be helping keep the left lung up and the right tube has been draining some. She is not tolerating diuresis either. So I will leave both tubes today  - Agree with continued diuresis and monitoring pleural effusion.   - IR-placed left apical pigtail chest tube on 7/25; management per Pulmonology  - Per surgeon, lungs did not fill chest space  - Daily wound care per nursing:  Wound cleanser to clamshell incision, pat dry, may be left open to air; keep incision C/D/I  - Remainder of plan per Pulm Transplant/Medicine teams     CVTS will continue to follow for surgical chest tube and clamshell incision management.       Frantz Rivas MD  1:13 PM  7/30/2022         Interval History:      On the commode most of the morning          Physical Exam:     Vitals: /76   Pulse 101   Temp 98.4  F (36.9  C) (Oral)   Resp 15   Ht 1.575 m (5' 2\")   Wt 75.1 kg (165 lb 9.1 oz)   SpO2 97%   BMI 30.28 kg/m    BMI= Body mass index is 30.28 kg/m .    Date 07/30/22 0700 - 07/31/22 0659   Shift 9811-9377 3724-7527 2842-0341 24 Hour Total   INTAKE   NG/   150   Enteral 130   130   Blood Components 230   230   Shift Total(mL/kg) 510(6.79)   " Assessment:    Nonischemic cardiomyopathy (HCC)  He has had a decrease in EF on most recent echo  Does not appear to be volume overloaded on exam today  He should remain on demadex per cardiology recommendations    Chronic respiratory failure with hypoxia and hypercapnia (HCC)  He will continue supplemental oxygen during the day and Trilogy non-invasive ventilation at night.   He can use the Trilogy during the day if he is napping or having a particularly rough day with his breathing.  Goal is to maintain sats >88%  He is going to get a new pulse oximeter because his is reading quite sporadic at this time    End stage COPD (HCC)  We discussed that there is nothing else for us to add to help his breathing.  He will continue prednisone 10mg daily for now  If he appears to be starting with an exacerbation - we can do a short burst of prednisone at higher dose and possible need for antibiotics    I wanted him to have ongoing contact with palliative care team, however there is a significant copay.     Pulmonary cachexia due to COPD (HCC)  Discussed the importance of adding protein drinks/calorie intake      BMI Findings:  Body mass index is 18.11 kg/m².       Plan:    Diagnoses and all orders for this visit:    Nonischemic cardiomyopathy (HCC)    Chronic respiratory failure with hypoxia and hypercapnia (HCC)    End stage COPD (HCC)    Pulmonary cachexia due to COPD (HCC)        Follow-up: 3-4 months      HPI:  Overall he continues to have a slow decline in respiratory function and worsening baseline dyspnea.   He does not have a significant edema - he is on a diuretic.   No chest pain.  He has been losing weight. Doesn't have the appetite.   He uses Trilogy vent at night - would like to know if he can use it more    His wife states that at times he gets upset with his current state and limitations.    Review of Systems   Constitutional:  Positive for activity change, appetite change and unexpected weight change.  "  Respiratory:  Positive for shortness of breath. Negative for cough.    Cardiovascular:  Positive for leg swelling.   Musculoskeletal:  Positive for gait problem.   Psychiatric/Behavioral:  Negative for sleep disturbance.        The following portions of the patient's history were reviewed and updated as appropriate: allergies, current medications, past family history, past medical history, past social history, past surgical history, and problem list.    VITALS:  Vitals:    11/19/24 1411 11/19/24 1412   BP: 110/70    Pulse: 56    Temp: 98.8 °F (37.1 °C)    SpO2: 99% 99%   Height: 5' 2\" (1.575 m)        Physical Exam  General:  Patient is awake, non-toxic and in no acute respiratory distress  +cachexia  Neck: No JVD  CV:  Regular, +S1 and S2, No murmurs, gallops or rubs appreciated  Lungs: Greatly diminished breath sounds with a few expiratory wheezes  Abdomen: Soft, +BS, Non-tender, non-distended  Extremities: +ankle edema  Neuro: No focal deficits, in wheelchair        Diagnostic Testing:    CBC:  Lab Results   Component Value Date    WBC 4.58 01/31/2024    HGB 13.3 10/23/2024    HCT 39 10/23/2024    MCV 96 01/31/2024     01/31/2024         BMP:   Lab Results   Component Value Date     08/17/2015    K 3.7 05/13/2024    CL 84 (L) 05/13/2024    CO2 >45 (H) 10/23/2024    ANIONGAP 6 08/17/2015    BUN 24 05/13/2024    CREATININE 1.02 05/13/2024    GLUCOSE 163 (H) 10/23/2024    GLUF 124 (H) 03/30/2023    CALCIUM 8.8 05/13/2024    CORRECTEDCA 9.2 01/28/2024    AST 14 01/31/2024    ALT 13 01/31/2024    ALKPHOS 60 01/31/2024    PROT 6.8 08/17/2015    BILITOT 0.60 08/17/2015    EGFR 75 05/13/2024         Imaging studies:  Echo 9/6/24    Left Ventricle: Left ventricular cavity size is dilated. Wall thickness is normal. The left ventricular ejection fraction is 25%. Systolic function is severely reduced. There is severe global hypokinesis with regional variation.    IVS: There is abnormal septal motion consistent " 510(6.79)   OUTPUT   Urine 250   250   Chest Tube(mL/kg) 30(0.4)   30(0.4)   Shift Total(mL/kg) 280(3.73)   280(3.73)   Weight (kg) 75.1 75.1 75.1 75.1        Gen: A&Ox4, NAD  Neuro: no focal deficits   CV: NSR on tele.   Pulm: Breathing regular and non-labored            Data:    Imaging:  CXR reviewed    CBC RESULTS: Recent Labs   Lab Test 07/30/22  0350   WBC 5.9   RBC 2.08*   HGB 6.7*   HCT 22.4*   *   MCH 32.2   MCHC 29.9*   RDW 16.3*        Last Comprehensive Metabolic Panel:  Sodium   Date Value Ref Range Status   07/30/2022 142 136 - 145 mmol/L Final   06/30/2021 138 133 - 144 mmol/L Final     Potassium   Date Value Ref Range Status   07/30/2022 4.5 3.4 - 5.3 mmol/L Final   07/06/2022 5.2 3.4 - 5.3 mmol/L Final   06/30/2021 4.4 3.4 - 5.3 mmol/L Final     Chloride   Date Value Ref Range Status   07/30/2022 91 (L) 98 - 107 mmol/L Final   04/29/2022 95 94 - 109 mmol/L Final   06/30/2021 100 94 - 109 mmol/L Final     Carbon Dioxide   Date Value Ref Range Status   06/30/2021 38 (H) 20 - 32 mmol/L Final     Carbon Dioxide (CO2)   Date Value Ref Range Status   07/30/2022 49 (HH) 22 - 29 mmol/L Final   04/29/2022 42 (H) 20 - 32 mmol/L Final     Anion Gap   Date Value Ref Range Status   07/30/2022 2 (L) 7 - 15 mmol/L Final   04/29/2022 3 3 - 14 mmol/L Final   06/30/2021 <1 (L) 3 - 14 mmol/L Final     Glucose   Date Value Ref Range Status   04/29/2022 112 (H) 70 - 99 mg/dL Final   06/30/2021 117 (H) 70 - 99 mg/dL Final     GLUCOSE BY METER POCT   Date Value Ref Range Status   07/30/2022 114 (H) 70 - 99 mg/dL Final     Urea Nitrogen   Date Value Ref Range Status   07/30/2022 84.2 (H) 8.0 - 23.0 mg/dL Final   04/29/2022 21 7 - 30 mg/dL Final   06/30/2021 30 7 - 30 mg/dL Final     Creatinine   Date Value Ref Range Status   07/30/2022 1.61 (H) 0.51 - 0.95 mg/dL Final   06/30/2021 0.85 0.52 - 1.04 mg/dL Final     GFR Estimate   Date Value Ref Range Status   07/30/2022 36 (L) >60 mL/min/1.73m2 Final      Comment:     Effective December 21, 2021 eGFRcr in adults is calculated using the 2021 CKD-EPI creatinine equation which includes age and gender (Deejay et al., NEJM, DOI: 10.1056/AQDWaf3854160)   06/30/2021 75 >60 mL/min/[1.73_m2] Final     Comment:     Non  GFR Calc  Starting 12/18/2018, serum creatinine based estimated GFR (eGFR) will be   calculated using the Chronic Kidney Disease Epidemiology Collaboration   (CKD-EPI) equation.       Calcium   Date Value Ref Range Status   07/30/2022 9.1 8.8 - 10.2 mg/dL Final   06/30/2021 9.6 8.5 - 10.1 mg/dL Final             with left bundle branch block.    Left Atrium: The atrium is moderately dilated.    Tricuspid Valve: There is mild regurgitation.    Prior TTE study available for comparison. Prior study date: 5/1/2023. Changes noted when compared to prior study. Changes include: The left atrium is now dilated.      Malia Luque DO

## 2024-11-21 NOTE — ASSESSMENT & PLAN NOTE
We discussed that there is nothing else for us to add to help his breathing.  He will continue prednisone 10mg daily for now  If he appears to be starting with an exacerbation - we can do a short burst of prednisone at higher dose and possible need for antibiotics    I wanted him to have ongoing contact with palliative care team, however there is a significant copay.

## 2024-11-21 NOTE — ASSESSMENT & PLAN NOTE
He will continue supplemental oxygen during the day and Trilogy non-invasive ventilation at night.   He can use the Trilogy during the day if he is napping or having a particularly rough day with his breathing.  Goal is to maintain sats >88%  He is going to get a new pulse oximeter because his is reading quite sporadic at this time

## 2024-11-22 ENCOUNTER — TELEPHONE (OUTPATIENT)
Age: 69
End: 2024-11-22

## 2024-11-27 ENCOUNTER — TELEPHONE (OUTPATIENT)
Age: 69
End: 2024-11-27

## 2024-11-27 NOTE — TELEPHONE ENCOUNTER
Rosa from [facility] is available for any care management assistance at [phone] x [phone]

## 2024-12-23 ENCOUNTER — TELEPHONE (OUTPATIENT)
Dept: PULMONOLOGY | Facility: CLINIC | Age: 69
End: 2024-12-23

## 2024-12-23 DIAGNOSIS — I50.22 CHRONIC HFREF (HEART FAILURE WITH REDUCED EJECTION FRACTION) (HCC): ICD-10-CM

## 2024-12-23 NOTE — TELEPHONE ENCOUNTER
CALLED PATIENT LETTING HIM KNOW THAT DR GREWAL WONT BE IN THE OFFICE ON FRIDAY 12/27 AND I WAS TO PUT PATIENT ON THE RECALL LIST FOR 3 MONTHS. WITCH I HAVE DONE.

## 2024-12-23 NOTE — TELEPHONE ENCOUNTER
As per Dr. Galicia to reschedule Dr. Vaughn Patient, Called patient and spoke with spouse Mellisa and she stated patient just saw Dr. Luque back in November 19th 2024. No need for patient to come in. She stated to put patient on a recall list for next FU.

## 2024-12-24 RX ORDER — TORSEMIDE 20 MG/1
TABLET ORAL
Qty: 180 TABLET | Refills: 1 | Status: SHIPPED | OUTPATIENT
Start: 2024-12-24

## 2025-02-18 NOTE — PROGRESS NOTES
Heart Failure Outpatient Progress Note - Natalio Hartmann Jr. 69 y.o. male MRN: [id number]    @ Encounter: [id number]      Assessment/Plan:    Patient Active Problem List    Diagnosis Date Noted    Physical deconditioning 02/21/2023    Chronic anemia 02/17/2023    Chronic HFrEF (heart failure with reduced ejection fraction) ([facility]) 09/12/2022    Pulmonary nodules/lesions, multiple 03/27/2022    Prostate cancer ([facility]) 05/24/2021    BPH with obstruction/lower urinary tract symptoms 04/13/2021    Chronic respiratory failure with hypoxia and hypercapnia ([facility]) 11/19/2020    Macrocytosis without anemia 05/23/2019    Other insomnia 02/18/2019    GERD (gastroesophageal reflux disease) 01/05/2017    Nonobstructive atherosclerosis of coronary artery 02/25/2016    Mood disorder ([facility]) 08/18/2015    Prediabetes 02/19/2015    Osteoporosis 10/14/2014    Vitamin D deficiency 10/14/2014    Nonischemic cardiomyopathy ([facility]) 09/26/2014    Left bundle-branch block 08/03/2013    Osteoarthritis 08/03/2013    Obstructive sleep apnea 07/29/2013    Iron deficiency anemia secondary to inadequate dietary iron intake 05/17/2024    SIADH (syndrome of inappropriate ADH production) ([facility]) 05/17/2024    Type 2 diabetes mellitus without complication, with long-term current use of insulin ([facility]) 05/17/2024    End stage COPD ([facility]) 02/08/2024    Dependence on respirator (ventilator) status ([facility]) 01/10/2024    Severe protein-calorie malnutrition ([facility]) 10/14/2023    History of bladder cancer 07/31/2023    Pulmonary cachexia due to COPD ([facility])     Chronic hypercapnia/KEVIN 07/20/2023    Metabolic encephalopathy 07/20/2023    Palliative care patient 06/13/2023    Alkalosis 06/12/2023    Malnutrition ([facility]) 06/10/2023    Hyperlipidemia 05/02/2023    COPD exacerbation ([facility]) 04/28/2023    Steroid-induced hyperglycemia 03/30/2023    SIRS (systemic inflammatory response syndrome) 02/17/2023    Hyponatremia 02/17/2023    Goals of care, counseling/discussion 02/25/2021        Chronic HFrEF; LVEF 20-25%; NYHA III; ACC/AHA Stage C/D  -Etiology: Nonischemic cardiomyopathy.  Possible dyssynchrony from chronic LBBB.  Despite QRS only being 120 ms, echo shows significant dyssynchrony.  -warm on exam, hemodynamically stable. He is cachectic with end stage COPD, poor nutrition with mild lower leg edema  -can continue volume management with Torsemide 20 mg alternating with 40 mg QOD.  -discussed role of palliative care for symptom management, he remains uninterested.      Weighed 115 lb on 5/10, 105 lb on 5/16/23, 104 lb 5/18, 103 lbs, 96 lbs, 97 lbs, 99 lbs, 97 lbs, today 97 lbs.      TTE 9/6/24: LVEF 25%  TTE 5/1/23: LVEF 20%. Severe global hypokinesis with regional variation. RV cavity size normal, systolic function normal. Trace MR, TR.   TTE 2/21/23: LVEF 30%. LVIDd 6.6cm. severe global hypokinesis. Grade I DD. RV cavity size and systolic function normal. Mild TR.   TTE 2/12/2023: LVEF 20-25%.  Severe global hypokinesis with regional variation.  Grade 1 DD.  Abnormal septal motion consistent with LBBB.  RV cavity mildly dilated, normal systolic function.  Mild MR, mild TR.  RVSP 38.  Dilated IVC.  TTE 8/26/2022: LVEF 40%.  RV cavity mildly dilated.  Systolic function normal.  Mild MR, TR.  Normal IVC.     Neurohormonal Blockade: GDMT limited by hypotension  --Beta Blocker: no  --ARNi / ACEi / ARB: losartan 12.5 mg QD, off   --Aldosterone Antagonist: no   --SGLT2 Inhibitor: no  --Home Diuretic: torsemide 40 mg QD alternating with 20 mg QD     Sudden Cardiac Death Risk Reduction:  --ICD: LVEF 20%.      Electrical Resynchronization:  --Candidacy for BiV device: QRSd 120ms, chronic LBBB with dyssynchrony on echocardiogram.     Advanced Therapies (if appropriate): Not appropriate at this time, particularly considering advanced lung disease.        COPD (end stage)  Former smoker; 105 pack years, quit in 2012.  Severe disease, follows closely with pulm.  On home oxygen.  Multiple  hospitalizations with acute COPD exacerbations   Follows with pulm  H/O COVID-19  Nonobstructive CAD  Has coronary calcium on CT  On aspirin and statin  Hyperlipidemia  Lab Results   Component Value Date    LDLCALC 75 08/19/2021     Continue statin  KEVIN   On BiPAP nightly  GOC.    -palliative care discussed. He is not interested     HPI:   Natalio Hartmann Jr. is a 67-year-old male who presented to [facility] on 2/12/2023 with an acute COPD exacerbation requiring intubation. Extubated 2/15/23.  PMH includes nonischemic cardiomyopathy, chronic systolic and diastolic heart failure, nonobstructive CAD, hyperlipidemia, severe COPD, KEVIN.  Was previously admitted in 8/2022 for acute decompensated heart failure and COPD exacerbation.  LVEF 40% at that time, down from prior of 45 to 50%, though previously EF had been 35% for years.  Started on Lasix at that time.  Has been euvolemic, though blood pressures have made it difficult to start/titrate up on GDMT.  Recently saw Dr. Kevin in the office in December, at which time his Lasix was shifted to as needed and adequate oral intake and hydration were encouraged.     He was reportedly short of breath for 2 days prior to presentation, with hallucinations.  His wife called 911 and EMS intubated him in the field.  He was found to have an elevated procalcitonin on admission and started on azithromycin and ceftriaxone.  He was given IV Lasix with good urine output.  He was weaned off the ventilator and extubated on 2/15.  Maintained on BiPAP overnight and has been receiving scheduled nebulizers with aggressive pulmonary hygiene. TTE on admission with LVEF 20 to 25%, down from 40% in 8/2022.      He is also followed by pulmonology, who recommended consideration for home hospice for severe COPD in January.  He was seen by palliative care while inpatient here, and patient and family elected to make code status level 3 DNR/DNI.  Pulmonology has been following him as well and has been  managing steroids, bronchodilators, and nebulizer therapy.     Per TH: 3/7/23. Presents for post hospital follow up with his wife. Just discharged from [facility]. Feeling at his baseline. Gets SOB with minimal exertion. O2 sats in the low 80's on 3-4 L NC,  on initial assessment today. Has complaints that his feet are red and swollen today. Wants to keep fighting. Dreading another hospital admission.      Diuretics increased after visit with Bren (Lasix increased to BID x 3 days.)     Recently hospitalized from 4/30-5/10/23 for acute respiratory failure in the setting of COPD. He was admitted initially to the ICU and treated with IV steroids, scheduled nebulized breathing treatments, and increased supplemental oxygen.  He also required BiPAP.  Ultimately he was stabilized and transition to the floor.  Goals of care were discussed at length with the patient and his wife.  He is currently a DNR/DNI. Plan per chart review per patient and his wife; plan with next exacerbation is to return to the hospital, but to transition to palliative care and comfort measures at that time. Referred to home hospice as of 5/12/23, wife expressed they would like to speak to palliative on Friday before making a final decision.      5/17/23: presents today for follow up. Feels at his baseline, O2 sats mid to high 90s on home 3L NC. Some LE swelling, improves with elevating his legs. Able to complete ADLs and move around the house, SOB not increased from his baseline. Meeting with palliative care tomorrow. Denies chest pain, palpitations, dizziness, syncope. Feels like he's urinating much more with torsemide than he did with Lasix. Weighs himself daily, small fluctuations but overall stable.      6/1/23: presents today for follow up. In respiratory distress, confused, tachypneic. Wife reports this has been worsening since this morning; no recent trigger, fevers, chills. +LE swelling. Confirmed at today's appointment that patient is currently  FULL CODE. EMS called, transported via ambulance to ED, transfer order placed.     6/26/23: presents today for follow up.  Hospitalized at [facility] from 6/1 to 6/12 for respiratory distress, treated for COPD exacerbation with steroids and breathing treatments, eventually weaned off BiPAP.  Treated with IV Lasix as well.  Reaffirmed full CODE STATUS.  Doing better today, feeling improved since hospitalization.  Appetite has been down, supplementing with Ensure.  Minimal lower extremity swelling, shortness of breath at baseline.  Urinating well with torsemide.  EMS was called on 6/13 by [facility], as patient was hypoxic and short of breath on their arrival.  EMS checked oxygen per wife, reports that was above 92%.  Does report that they noticed elevated blood sugar, so she has been checking his sugars at home and noting numbers in the 200s. No further EMS calls, has been tolerating medications.      Hospitalized 7/19 through 7/21 and again 7/22 through 7/29 for COPD exacerbation.  Hospitalized again at [facility] from 7/31 to 8/3, again for hypoxic respiratory failure in the setting of his home BiPAP not working. Home oxygen at 6L NC at rest and 8L with exertion, along with nightly BiPAP. Has appropriate supplies at home.      8/14/23: presents today for follow up. Breathing at baseline. On 3L home O2 at rest, 6L with exertion. Some swelling in feet and ankles. Urinating well with current dose of torsemide. Drinking Ensure, trying to gain caloric weight. Weight between 92-96 lbs at home. Would like to explore virtual visits when possible going forward.      9/6/23: Presents today for follow up. Being treated for pneumonia by pulm, occasional chills. Taking antibiotics. Increased his torsemide to 40 mg BID x 2 days with increased urination. Has +LE swelling, increased cough from baseline. Unable to fully cough up sputum. No fever. On consistent home O2.      10/6/23: Presents for virtual visit today. Feeling at his baseline. Feels like  his appetite is adequate but gets fatigued easily when eating. Some LE swelling, at baseline. Taking torsemide 20 mg BID, urinating well with this. Breathing at baseline, coughing less since finishing antibiotic course. Feels like he needs to drink more water, reviewed hydration importance with him. Seeing palliative care and pulm next week. No cardiac complaints today.      3/11/2024: Presents today for follow-up.  Saw Dr. Funez in January, who ordered a repeat echocardiogram, which has not yet been done.  Admitted to [facility] from 1/27 to 2/4/2024 for COPD exacerbation.  Admitted to ICU and treated with BiPAP, bronchodilators, steroids, diuretics, and antibiotics.  Weaned down to baseline oxygen requirement prior to discharge.  Palliative care again consulted inpatient, but declined any end-of-life goals of care discussions.     Feeling at his baseline today.  Weights have been stable, minimal lower extremity swelling, breathing at baseline.  Having some trouble with keeping his appetite up, has been drinking about 2 Ensure shakes a day for protein.  Advised small, frequent meals throughout the day as much as tolerated.  Trying to stick to fluid and sodium restriction.  Urinating quite a bit with current torsemide dose.  Notices that when his oxygen level drops, he sees his heart rate increase, which normalizes with rest/oxygenation.  No sensation of palpitations or skipped beats.  Denies dizziness, lightheadedness, syncope.     4/15/24: presents today for follow up. Thinks torsemide alternating dose is working. Thinks he may getting a cold. Coughs with CPAP mask at night, recently calibrated. Using baseline oxygen (between 4-5 L NC). Intermittent foot swelling. Limited appetite. Reiterated small, frequent meals throughout the day and optimizing water intake. Some foot swelling, mild compared to baseline. Wife notices higher heart rates when oxygen levels drop occasionally.     8/13/24 CHELSEA Lance clinically  remains about the same from a cardiac standpoint. He has deconditioned and has been significantly affected by his chronic lung disease and the inability to do things. He is essentially wheelchair-bound at this point. He shows no signs of volume overload today. No edema. He has chronic shortness of breath which appears no different compared to the last time I saw him. He recently had a CT scan that showed a consolidation/opacity of the left lower lobe and he did go on empiric antibiotics. He is a repeat CT scan tomorrow.     2/20/25 Presents for follow up with his family.  Reports feeling at his baseline.  He is most frustrated with becoming short of breath with simple things such as eating.  Continues to lose weight.  He has some bilateral leg swelling which has been stable.  Denies chest pain, orthopnea or PND.  Past Medical History:   Diagnosis Date    Acute metabolic encephalopathy 03/29/2022    Arthritis     Bladder mass     Cardiomyopathy (HCC)     Chest pain     COPD (chronic obstructive pulmonary disease) ([facility])     COVID-19 virus infection 02/23/2023    CPAP (continuous positive airway pressure) dependence     Emphysema of lung ([facility])     Hypoxia     nocturnal    Left bundle branch block     Multiple pulmonary nodules     last assessed: 10/12/16    Pneumonia     Sleep apnea, obstructive     Smoker     Weight loss 08/29/2019       12 point ROS negative other than that stated in HPI    No Known Allergies  .    Current Outpatient Medications:     albuterol (PROVENTIL HFA,VENTOLIN HFA) 90 mcg/act inhaler, Inhale 2 puffs every 6 (six) hours as needed for wheezing or shortness of breath, Disp: 18 g, Rfl: 6    budesonide (PULMICORT) 0.5 mg/2 mL nebulizer solution, TAKE 2 ML (0.5 MG TOTAL) BY NEBULIZATION TWICE A DAY RINSE MOUTH AFTER USE, Disp: 1080 mL, Rfl: 1    CALCIUM PO, Take by mouth, Disp: , Rfl:     ferrous sulfate 324 (65 Fe) mg, Take 1 tablet (324 mg total) by mouth daily before breakfast, Disp: 90 tablet,  Rfl: 1    folic acid (FOLVITE) 400 mcg tablet, Take 1 tablet (400 mcg total) by mouth daily, Disp: 90 tablet, Rfl: 1    ipratropium-albuterol (DUO-NEB) 0.5-2.5 mg/3 mL nebulizer solution, Take 3 mL by nebulization 4 (four) times a day, Disp: 360 mL, Rfl: 5    Nebulizers (InnoSpire Essence Nebulizer) MISC, Use as directed, Disp: , Rfl:     predniSONE 10 mg tablet, TAKE 1 TABLET BY MOUTH EVERY DAY, Disp: 30 tablet, Rfl: 2    torsemide (DEMADEX) 20 mg tablet, TAKE 1 TABLET ONE DAY, ALTERNATING WITH 2 TABLETS THE NEXT, Disp: 180 tablet, Rfl: 1    Social History     Socioeconomic History    Marital status: /Civil Union     Spouse name: Not on file    Number of children: Not on file    Years of education: Not on file    Highest education level: Not on file   Occupational History    Not on file   Tobacco Use    Smoking status: Former     Current packs/day: 0.00     Average packs/day: 2.5 packs/day for 42.0 years (105.0 ttl pk-yrs)     Types: Cigarettes     Start date:      Quit date:      Years since quittin.1    Smokeless tobacco: Former    Tobacco comments:     1 ppd for 37 years, 2010 down to 5 cigs a day, is around second hand smoke   Vaping Use    Vaping status: Never Used   Substance and Sexual Activity    Alcohol use: Not Currently     Comment: rarely    Drug use: No    Sexual activity: Not Currently     Partners: Female   Other Topics Concern    Not on file   Social History Narrative    Daily coffee consumption: 8 or more cups a day        Used to work in [employer].  On disability.     Social Drivers of Health     Financial Resource Strain: Not on file   Food Insecurity: No Food Insecurity (2024)    Nursing - Inadequate Food Risk Classification     Worried About Running Out of Food in the Last Year: Never true     Ran Out of Food in the Last Year: Never true     Ran Out of Food in the Last Year: Not on file   Transportation Needs: No Transportation Needs (2024)    PRAPARE -  "Transportation     Lack of Transportation (Medical): No     Lack of Transportation (Non-Medical): No   Physical Activity: Not on file   Stress: Not on file   Social Connections: Not on file   Intimate Partner Violence: Not on file   Housing Stability: Low Risk  (1/27/2024)    Housing Stability Vital Sign     Unable to Pay for Housing in the Last Year: No     Number of Times Moved in the Last Year: 1     Homeless in the Last Year: No       Family History   Problem Relation Age of Onset    Diabetes Mother        Physical Exam:    Vitals: /60 (BP Location: Left arm, Patient Position: Sitting, Cuff Size: Standard)   Pulse 57   Ht 5' 2\" (1.575 m)   Wt 44.3 kg (97 lb 11.2 oz)   SpO2 100% Comment: 5L  BMI 17.87 kg/m²     Wt Readings from Last 3 Encounters:   09/06/24 44.9 kg (99 lb)   08/13/24 45.2 kg (99 lb 9.6 oz)   07/29/24 44.7 kg (98 lb 9.6 oz)         GEN: Natalio Hartmann Jr. appears well, alert and oriented x 3, pleasant and cooperative   HEENT: pupils equal, round, and reactive to light; extraocular muscles intact  NECK: supple, no carotid bruits   HEART: regular rhythm, normal S1 and S2, no murmurs, clicks, gallops or rubs, JVP is  flat  LUNGS:severely diminished, scattered wheezes.  ABDOMEN: normal bowel sounds, soft, no tenderness, no distention  EXTREMITIES: peripheral pulses normal; no clubbing, cyanosis, +1 BLLE edema  NEURO: no focal findings   SKIN: normal without suspicious lesions on exposed skin    Labs & Results:  Lab Results   Component Value Date     08/17/2015    SODIUM 139 05/13/2024    K 3.7 05/13/2024    CL 84 (L) 05/13/2024    CO2 >45 (H) 10/23/2024    ANIONGAP 6 08/17/2015    AGAP  05/13/2024      Comment:        Unable to calculate    BUN 24 05/13/2024    CREATININE 1.02 05/13/2024    GLUC 150 (H) 05/13/2024    GLUF 124 (H) 03/30/2023    CALCIUM 8.8 05/13/2024    AST 14 01/31/2024    ALT 13 01/31/2024    ALKPHOS 60 01/31/2024    PROT 6.8 08/17/2015    TP 6.0 (L) 01/31/2024    " BILITOT 0.60 08/17/2015    TBILI 0.32 01/31/2024    EGFR 75 05/13/2024     Lab Results   Component Value Date    WBC 4.58 01/31/2024    HGB 13.3 10/23/2024    HCT 39 10/23/2024    MCV 96 01/31/2024     01/31/2024     Lab Results   Component Value Date     (H) 01/27/2024      Lab Results   Component Value Date    LDLCALC 75 08/19/2021     Lab Results   Component Value Date    RYY8UHONNSBP 1.385 07/19/2023     Lab Results   Component Value Date    HGBA1C 6.5 (H) 08/30/2023         EKG personally reviewed by DELIA Bowens.   No results found for this visit on 02/21/25.     Counseling / Coordination of Care  Total face to face time spent with patient 15 minutes.  An additional 10 minutes was spent for chart/data review and visit preparation.       Thank you for the opportunity to participate in the care of this patient.    DELIA Bowens

## 2025-02-21 ENCOUNTER — OFFICE VISIT (OUTPATIENT)
Dept: CARDIOLOGY CLINIC | Facility: CLINIC | Age: 70
End: 2025-02-21
Payer: MEDICARE

## 2025-02-21 VITALS
OXYGEN SATURATION: 100 % | HEIGHT: 62 IN | BODY MASS INDEX: 17.98 KG/M2 | WEIGHT: 97.7 LBS | DIASTOLIC BLOOD PRESSURE: 60 MMHG | SYSTOLIC BLOOD PRESSURE: 130 MMHG | HEART RATE: 57 BPM

## 2025-02-21 DIAGNOSIS — I42.9 CARDIOMYOPATHY, UNSPECIFIED TYPE (HCC): Primary | ICD-10-CM

## 2025-02-21 PROCEDURE — 99214 OFFICE O/P EST MOD 30 MIN: CPT | Performed by: NURSE PRACTITIONER

## 2025-02-21 NOTE — PATIENT INSTRUCTIONS
Weigh yourself daily  If you gain 3 lbs in one day or 5 lbs in one week, please call the office at [phone] and ask for a nurse or the heart failure nurse  Keep your sodium intake to <2 grams, (2000 mg) per day, and fluids <2 Liters (2000 ml) per day. This is around 6-7, 8 oz glasses of fluid per day

## 2025-02-25 ENCOUNTER — TELEPHONE (OUTPATIENT)
Age: 70
End: 2025-02-25

## 2025-02-25 NOTE — TELEPHONE ENCOUNTER
Pts wife stating [facility] told her there is an issue w/ the prednisone refill    Asking if someone can call [facility] to discuss further/ Also schedule assist for a F/U appt w/ Dr. Luque

## 2025-02-25 NOTE — TELEPHONE ENCOUNTER
Pavithra is following up on her call this morning in regards to her  medication . Pavithra would like to know if the provider was able to  reach out to [facility] to figure our what is the issue with his medication prednisone . Please advise pavithra with an update.     Pavithra would also l like to schedule Natalio for his 3 month fu appointment with Dr.Yoder Pak number [phone]

## 2025-02-27 DIAGNOSIS — J44.9 END STAGE COPD (HCC): ICD-10-CM

## 2025-02-27 RX ORDER — PREDNISONE 10 MG/1
10 TABLET ORAL DAILY
Qty: 30 TABLET | Refills: 6 | Status: SHIPPED | OUTPATIENT
Start: 2025-02-27

## 2025-02-27 NOTE — PROGRESS NOTES
Patient's wife called concerned about prednisone refill  I called [facility] - needs additional refills  I placed electronically  Will call and let his wife know

## 2025-03-14 ENCOUNTER — HOSPITAL ENCOUNTER (INPATIENT)
Facility: HOSPITAL | Age: 70
LOS: 21 days | Discharge: HOME HEALTH | DRG: 291 | End: 2025-04-04
Attending: EMERGENCY MEDICINE | Admitting: STUDENT IN AN ORGANIZED HEALTH CARE EDUCATION/TRAINING PROGRAM
Payer: COMMERCIAL

## 2025-03-14 ENCOUNTER — APPOINTMENT (EMERGENCY)
Dept: RADIOLOGY | Facility: HOSPITAL | Age: 70
DRG: 291 | End: 2025-03-14
Payer: COMMERCIAL

## 2025-03-14 DIAGNOSIS — K59.00 CONSTIPATED: ICD-10-CM

## 2025-03-14 DIAGNOSIS — J44.9 END STAGE COPD (HCC): ICD-10-CM

## 2025-03-14 DIAGNOSIS — I25.10 CAD (CORONARY ARTERY DISEASE): ICD-10-CM

## 2025-03-14 DIAGNOSIS — J96.01 ACUTE RESPIRATORY FAILURE WITH HYPOXIA AND HYPERCAPNIA (HCC): Primary | ICD-10-CM

## 2025-03-14 DIAGNOSIS — J96.02 ACUTE RESPIRATORY FAILURE WITH HYPOXIA AND HYPERCAPNIA (HCC): Primary | ICD-10-CM

## 2025-03-14 DIAGNOSIS — I50.22 CHRONIC HFREF (HEART FAILURE WITH REDUCED EJECTION FRACTION) (HCC): ICD-10-CM

## 2025-03-14 DIAGNOSIS — J44.1 COPD EXACERBATION (HCC): ICD-10-CM

## 2025-03-14 DIAGNOSIS — I50.23 ACUTE ON CHRONIC SYSTOLIC CONGESTIVE HEART FAILURE (HCC): ICD-10-CM

## 2025-03-14 LAB
ALBUMIN SERPL BCG-MCNC: 4.2 G/DL (ref 3.5–5)
ALP SERPL-CCNC: 60 U/L (ref 34–104)
ALT SERPL W P-5'-P-CCNC: 57 U/L (ref 7–52)
APTT PPP: 22 SECONDS (ref 23–34)
AST SERPL W P-5'-P-CCNC: 88 U/L (ref 13–39)
BASE EX.OXY STD BLDV CALC-SCNC: 49.6 % (ref 60–80)
BASE EX.OXY STD BLDV CALC-SCNC: 49.9 % (ref 60–80)
BASE EXCESS BLDV CALC-SCNC: 14.2 MMOL/L
BASE EXCESS BLDV CALC-SCNC: 20 MMOL/L
BASOPHILS # BLD AUTO: 0.03 THOUSANDS/ÂΜL (ref 0–0.1)
BASOPHILS NFR BLD AUTO: 0 % (ref 0–1)
BILIRUB SERPL-MCNC: 0.71 MG/DL (ref 0.2–1)
BUN SERPL-MCNC: 38 MG/DL (ref 5–25)
CALCIUM SERPL-MCNC: 8.7 MG/DL (ref 8.4–10.2)
CHLORIDE SERPL-SCNC: 84 MMOL/L (ref 96–108)
CO2 SERPL-SCNC: >45 MMOL/L (ref 21–32)
CREAT SERPL-MCNC: 1.15 MG/DL (ref 0.6–1.3)
EOSINOPHIL # BLD AUTO: 0.01 THOUSAND/ÂΜL (ref 0–0.61)
EOSINOPHIL NFR BLD AUTO: 0 % (ref 0–6)
ERYTHROCYTE [DISTWIDTH] IN BLOOD BY AUTOMATED COUNT: 13.4 % (ref 11.6–15.1)
EST. AVERAGE GLUCOSE BLD GHB EST-MCNC: 128 MG/DL
GFR SERPL CREATININE-BSD FRML MDRD: 65 ML/MIN/1.73SQ M
GLUCOSE SERPL-MCNC: 132 MG/DL (ref 65–140)
HBA1C MFR BLD: 6.1 %
HCO3 BLDV-SCNC: 45.2 MMOL/L (ref 24–30)
HCO3 BLDV-SCNC: 54 MMOL/L (ref 24–30)
HCT VFR BLD AUTO: 40.6 % (ref 36.5–49.3)
HGB BLD-MCNC: 11.9 G/DL (ref 12–17)
IMM GRANULOCYTES # BLD AUTO: 0.03 THOUSAND/UL (ref 0–0.2)
IMM GRANULOCYTES NFR BLD AUTO: 0 % (ref 0–2)
INR PPP: 0.84 (ref 0.85–1.19)
LACTATE SERPL-SCNC: 1.4 MMOL/L (ref 0.5–2)
LYMPHOCYTES # BLD AUTO: 0.72 THOUSANDS/ÂΜL (ref 0.6–4.47)
LYMPHOCYTES NFR BLD AUTO: 9 % (ref 14–44)
MCH RBC QN AUTO: 30.7 PG (ref 26.8–34.3)
MCHC RBC AUTO-ENTMCNC: 29.3 G/DL (ref 31.4–37.4)
MCV RBC AUTO: 105 FL (ref 82–98)
MONOCYTES # BLD AUTO: 1.28 THOUSAND/ÂΜL (ref 0.17–1.22)
MONOCYTES NFR BLD AUTO: 16 % (ref 4–12)
NEUTROPHILS # BLD AUTO: 5.76 THOUSANDS/ÂΜL (ref 1.85–7.62)
NEUTS SEG NFR BLD AUTO: 75 % (ref 43–75)
NRBC BLD AUTO-RTO: 0 /100 WBCS
O2 CT BLDV-SCNC: 7.9 ML/DL
O2 CT BLDV-SCNC: 8.5 ML/DL
PCO2 BLDV: 104.6 MM HG (ref 42–50)
PCO2 BLDV: 141.7 MM HG (ref 42–50)
PH BLDV: 7.2 [PH] (ref 7.3–7.4)
PH BLDV: 7.25 [PH] (ref 7.3–7.4)
PLATELET # BLD AUTO: 174 THOUSANDS/UL (ref 149–390)
PLATELET # BLD AUTO: 207 THOUSANDS/UL (ref 149–390)
PMV BLD AUTO: 10 FL (ref 8.9–12.7)
PMV BLD AUTO: 10 FL (ref 8.9–12.7)
PO2 BLDV: 31.5 MM HG (ref 35–45)
PO2 BLDV: 32.4 MM HG (ref 35–45)
POTASSIUM SERPL-SCNC: 4.6 MMOL/L (ref 3.5–5.3)
PROCALCITONIN SERPL-MCNC: 0.21 NG/ML
PROT SERPL-MCNC: 7.2 G/DL (ref 6.4–8.4)
PROTHROMBIN TIME: 11.9 SECONDS (ref 12.3–15)
RBC # BLD AUTO: 3.87 MILLION/UL (ref 3.88–5.62)
SODIUM SERPL-SCNC: 136 MMOL/L (ref 135–147)
WBC # BLD AUTO: 7.83 THOUSAND/UL (ref 4.31–10.16)

## 2025-03-14 PROCEDURE — 71045 X-RAY EXAM CHEST 1 VIEW: CPT

## 2025-03-14 PROCEDURE — 83036 HEMOGLOBIN GLYCOSYLATED A1C: CPT | Performed by: INTERNAL MEDICINE

## 2025-03-14 PROCEDURE — 94760 N-INVAS EAR/PLS OXIMETRY 1: CPT

## 2025-03-14 PROCEDURE — 0241U HB NFCT DS VIR RESP RNA 4 TRGT: CPT | Performed by: INTERNAL MEDICINE

## 2025-03-14 PROCEDURE — 85025 COMPLETE CBC W/AUTO DIFF WBC: CPT

## 2025-03-14 PROCEDURE — 94002 VENT MGMT INPAT INIT DAY: CPT

## 2025-03-14 PROCEDURE — 80053 COMPREHEN METABOLIC PANEL: CPT

## 2025-03-14 PROCEDURE — 36415 COLL VENOUS BLD VENIPUNCTURE: CPT

## 2025-03-14 PROCEDURE — 84145 PROCALCITONIN (PCT): CPT

## 2025-03-14 PROCEDURE — 99223 1ST HOSP IP/OBS HIGH 75: CPT | Performed by: INTERNAL MEDICINE

## 2025-03-14 PROCEDURE — 82805 BLOOD GASES W/O2 SATURATION: CPT

## 2025-03-14 PROCEDURE — 96367 TX/PROPH/DG ADDL SEQ IV INF: CPT

## 2025-03-14 PROCEDURE — 87040 BLOOD CULTURE FOR BACTERIA: CPT

## 2025-03-14 PROCEDURE — 85049 AUTOMATED PLATELET COUNT: CPT | Performed by: INTERNAL MEDICINE

## 2025-03-14 PROCEDURE — 99285 EMERGENCY DEPT VISIT HI MDM: CPT

## 2025-03-14 PROCEDURE — 99291 CRITICAL CARE FIRST HOUR: CPT | Performed by: EMERGENCY MEDICINE

## 2025-03-14 PROCEDURE — 96365 THER/PROPH/DIAG IV INF INIT: CPT

## 2025-03-14 PROCEDURE — 94664 DEMO&/EVAL PT USE INHALER: CPT

## 2025-03-14 PROCEDURE — 83605 ASSAY OF LACTIC ACID: CPT

## 2025-03-14 PROCEDURE — 83880 ASSAY OF NATRIURETIC PEPTIDE: CPT | Performed by: INTERNAL MEDICINE

## 2025-03-14 PROCEDURE — 85730 THROMBOPLASTIN TIME PARTIAL: CPT

## 2025-03-14 PROCEDURE — 94640 AIRWAY INHALATION TREATMENT: CPT

## 2025-03-14 PROCEDURE — 85610 PROTHROMBIN TIME: CPT

## 2025-03-14 RX ORDER — SODIUM CHLORIDE FOR INHALATION 0.9 %
12 VIAL, NEBULIZER (ML) INHALATION ONCE
Status: COMPLETED | OUTPATIENT
Start: 2025-03-14 | End: 2025-03-14

## 2025-03-14 RX ORDER — BENZONATATE 100 MG/1
200 CAPSULE ORAL 3 TIMES DAILY
Status: DISCONTINUED | OUTPATIENT
Start: 2025-03-14 | End: 2025-03-19

## 2025-03-14 RX ORDER — GUAIFENESIN/DEXTROMETHORPHAN 100-10MG/5
10 SYRUP ORAL EVERY 4 HOURS PRN
Status: DISCONTINUED | OUTPATIENT
Start: 2025-03-14 | End: 2025-03-19

## 2025-03-14 RX ORDER — LEVALBUTEROL INHALATION SOLUTION 1.25 MG/3ML
1.25 SOLUTION RESPIRATORY (INHALATION)
Status: DISCONTINUED | OUTPATIENT
Start: 2025-03-14 | End: 2025-03-27

## 2025-03-14 RX ORDER — FORMOTEROL FUMARATE 20 UG/2ML
20 SOLUTION RESPIRATORY (INHALATION)
Status: DISCONTINUED | OUTPATIENT
Start: 2025-03-14 | End: 2025-04-04 | Stop reason: HOSPADM

## 2025-03-14 RX ORDER — IPRATROPIUM BROMIDE AND ALBUTEROL SULFATE .5; 3 MG/3ML; MG/3ML
1 SOLUTION RESPIRATORY (INHALATION) ONCE
Status: COMPLETED | OUTPATIENT
Start: 2025-03-14 | End: 2025-03-14

## 2025-03-14 RX ORDER — METHYLPREDNISOLONE SODIUM SUCCINATE 40 MG/ML
40 INJECTION, POWDER, LYOPHILIZED, FOR SOLUTION INTRAMUSCULAR; INTRAVENOUS EVERY 12 HOURS SCHEDULED
Status: DISCONTINUED | OUTPATIENT
Start: 2025-03-15 | End: 2025-03-18

## 2025-03-14 RX ORDER — METHYLPREDNISOLONE SODIUM SUCCINATE 40 MG/ML
40 INJECTION, POWDER, LYOPHILIZED, FOR SOLUTION INTRAMUSCULAR; INTRAVENOUS EVERY 8 HOURS SCHEDULED
Status: DISCONTINUED | OUTPATIENT
Start: 2025-03-14 | End: 2025-03-14

## 2025-03-14 RX ORDER — ONDANSETRON 2 MG/ML
4 INJECTION INTRAMUSCULAR; INTRAVENOUS EVERY 6 HOURS PRN
Status: DISCONTINUED | OUTPATIENT
Start: 2025-03-14 | End: 2025-04-04 | Stop reason: HOSPADM

## 2025-03-14 RX ORDER — ALBUTEROL SULFATE 2.5 MG/3ML
2 SOLUTION RESPIRATORY (INHALATION) ONCE
Status: COMPLETED | OUTPATIENT
Start: 2025-03-14 | End: 2025-03-14

## 2025-03-14 RX ORDER — AZITHROMYCIN 250 MG/1
250 TABLET, FILM COATED ORAL EVERY 24 HOURS
Status: DISCONTINUED | OUTPATIENT
Start: 2025-03-15 | End: 2025-03-16

## 2025-03-14 RX ORDER — ENOXAPARIN SODIUM 100 MG/ML
40 INJECTION SUBCUTANEOUS DAILY
Status: DISCONTINUED | OUTPATIENT
Start: 2025-03-14 | End: 2025-03-17

## 2025-03-14 RX ORDER — ACETAMINOPHEN 325 MG/1
650 TABLET ORAL EVERY 6 HOURS PRN
Status: DISCONTINUED | OUTPATIENT
Start: 2025-03-14 | End: 2025-04-04 | Stop reason: HOSPADM

## 2025-03-14 RX ORDER — METHYLPREDNISOLONE SOD SUCC 125 MG
1 VIAL (EA) INJECTION ONCE
Status: COMPLETED | OUTPATIENT
Start: 2025-03-14 | End: 2025-03-14

## 2025-03-14 RX ORDER — ALBUTEROL SULFATE 5 MG/ML
10 SOLUTION RESPIRATORY (INHALATION) ONCE
Status: COMPLETED | OUTPATIENT
Start: 2025-03-14 | End: 2025-03-14

## 2025-03-14 RX ORDER — ACETAMINOPHEN 10 MG/ML
1000 INJECTION, SOLUTION INTRAVENOUS ONCE
Status: COMPLETED | OUTPATIENT
Start: 2025-03-14 | End: 2025-03-14

## 2025-03-14 RX ORDER — POLYETHYLENE GLYCOL 3350 17 G/17G
17 POWDER, FOR SOLUTION ORAL DAILY
Status: DISCONTINUED | OUTPATIENT
Start: 2025-03-15 | End: 2025-04-04 | Stop reason: HOSPADM

## 2025-03-14 RX ORDER — FUROSEMIDE 10 MG/ML
40 INJECTION INTRAMUSCULAR; INTRAVENOUS 2 TIMES DAILY
Status: DISCONTINUED | OUTPATIENT
Start: 2025-03-14 | End: 2025-03-18

## 2025-03-14 RX ORDER — DOCUSATE SODIUM 100 MG/1
100 CAPSULE, LIQUID FILLED ORAL 2 TIMES DAILY
Status: DISCONTINUED | OUTPATIENT
Start: 2025-03-14 | End: 2025-04-04 | Stop reason: HOSPADM

## 2025-03-14 RX ORDER — MAGNESIUM HYDROXIDE/ALUMINUM HYDROXICE/SIMETHICONE 120; 1200; 1200 MG/30ML; MG/30ML; MG/30ML
30 SUSPENSION ORAL EVERY 6 HOURS PRN
Status: DISCONTINUED | OUTPATIENT
Start: 2025-03-14 | End: 2025-04-04 | Stop reason: HOSPADM

## 2025-03-14 RX ORDER — BUDESONIDE 0.5 MG/2ML
0.5 INHALANT ORAL
Status: DISCONTINUED | OUTPATIENT
Start: 2025-03-14 | End: 2025-04-04 | Stop reason: HOSPADM

## 2025-03-14 RX ADMIN — ISODIUM CHLORIDE 12 ML: 0.03 SOLUTION RESPIRATORY (INHALATION) at 10:22

## 2025-03-14 RX ADMIN — ACETAMINOPHEN 1000 MG: 10 INJECTION INTRAVENOUS at 12:12

## 2025-03-14 RX ADMIN — IPRATROPIUM BROMIDE 0.5 MG: 0.5 SOLUTION RESPIRATORY (INHALATION) at 21:45

## 2025-03-14 RX ADMIN — BUDESONIDE 0.5 MG: 0.5 INHALANT RESPIRATORY (INHALATION) at 21:44

## 2025-03-14 RX ADMIN — LEVALBUTEROL HYDROCHLORIDE 1.25 MG: 1.25 SOLUTION RESPIRATORY (INHALATION) at 21:45

## 2025-03-14 RX ADMIN — AZITHROMYCIN MONOHYDRATE 500 MG: 500 INJECTION, POWDER, LYOPHILIZED, FOR SOLUTION INTRAVENOUS at 12:17

## 2025-03-14 RX ADMIN — IPRATROPIUM BROMIDE 1 MG: 0.5 SOLUTION RESPIRATORY (INHALATION) at 10:23

## 2025-03-14 RX ADMIN — CEFTRIAXONE SODIUM 1000 MG: 10 INJECTION, POWDER, FOR SOLUTION INTRAVENOUS at 11:30

## 2025-03-14 RX ADMIN — FORMOTEROL FUMARATE DIHYDRATE 20 MCG: 20 SOLUTION RESPIRATORY (INHALATION) at 21:45

## 2025-03-14 RX ADMIN — ALBUTEROL SULFATE 10 MG: 2.5 SOLUTION RESPIRATORY (INHALATION) at 10:23

## 2025-03-14 NOTE — ASSESSMENT & PLAN NOTE
Malnutrition Findings:                                 BMI Findings:           Body mass index is 17.74 kg/m².

## 2025-03-14 NOTE — RESPIRATORY THERAPY NOTE
Resp care   03/14/25 1340   Respiratory Assessment   Resp Comments pt titrated to 5L nc at this time, spo2 is 100%, will cont to monitor per rcp.   Oxygen Therapy/Pulse Ox   O2 Device Nasal cannula   Nasal Cannula O2 Flow Rate (L/min) 5 L/min   Calculated FIO2 (%) - Nasal Cannula 40   SpO2 Activity At Rest   $ Pulse Oximetry Spot Check Charge Completed

## 2025-03-14 NOTE — SEPSIS NOTE
"  Sepsis Note   Natalio Hartmann Jr. 69 y.o. male MRN: [id number]  Unit/Bed#: QCE Encounter: [id number]       Initial Sepsis Screening       Row Name 03/14/25 1115                Is the patient's history suggestive of a new or worsening infection? Yes (Proceed)  -BM        Suspected source of infection pneumonia  -BM        Indicate SIRS criteria Hyperthemia > 38.3C (100.9F) OR Hypothermia <36C (96.8F);Tachycardia > 90 bpm;Tachypnea > 20 resp per min  -BM        Are two or more of the above signs & symptoms of infection both present and new to the patient? Yes (Proceed)  -BM        Assess for evidence of organ dysfunction: Are any of the below criteria present within 6 hours of suspected infection and SIRS criteria that are NOT considered to be chronic conditions? --                  User Key  (r) = Recorded By, (t) = Taken By, (c) = Cosigned By      Initials Name Provider Type     Jarad Conner MD Resident                    Default Flowsheet Data (Last 720 Hours)       Sepsis Reassess       Row Name 03/14/25 1505                   Repeat Volume Status and Tissue Perfusion Assessment Performed    Date of Reassessment: 03/14/25  -        Time of Reassessment: 1506  -BM        Sepsis Reassessment Note: Click \"NEXT\" below (NOT \"close\") to generate sepsis reassessment note. YES (proceed by clicking \"NEXT\")  -        Repeat Volume Status and Tissue Perfusion Assessment Performed --                  User Key  (r) = Recorded By, (t) = Taken By, (c) = Cosigned By      Initials Name Provider Type     Jarad Conner MD Resident                    Body mass index is 17.74 kg/m².  Wt Readings from Last 1 Encounters:   03/14/25 44 kg (97 lb)        Ideal body weight: 57.1 kg (125 lb 13.8 oz)    "

## 2025-03-14 NOTE — CONSULTS
Consultation - Pulmonology   Name: Natalio Hartmann Jr. 69 y.o. male I MRN: [id number]  Unit/Bed#: QCE I Date of Admission: 3/14/2025   Date of Service: 3/14/2025 I Hospital Day: 0   Inpatient consult to Pulmonology  Consult performed by: Yasmin Bennett MD  Consult ordered by: Sadie Sung MD      Physician Requesting Evaluation: Sadei Sung, *   Reason for Evaluation / Principal Problem: COPD Exacerbation    Assessment & Plan  Acute on chronic respiratory failure with hypoxia and hypercapnia (HCC)  69-year-old male with medical history of very severe COPD FEV1 22%, chronic hypoxic and hypercapnic respiratory failure on 4 L supplemental O2 + Trilogy NIV at night, chronic prednisone 10 mg, COPD cachexia nonischemic cardiomyopathy EF 25%, KEVIN on BiPAP who presents with worsening shortness of breath.   In ED patient found to be hypotensive, lethargic, hypercapnic 7.19/141.7 for which BiPAP placed 20/6 (baseline 7.3/86.8).  Meeting sepsis criteria given ceftriaxone in ED.Patient status post albuterol Atrovent nebs in ED Solu-Medrol 125.    Labs: Pro-Jorge Alberto negative BNP pending.   X-ray reviewed cardiomegaly, pulmonary congestion noted.   On examination patient down to 5 L via nasal cannula, crackles noted to bases JVD present, hepatomegaly, 2+ pedal edema    Presentation likely in setting of CHF exacerbation, with component of COPD.   Continue with diuresis per primary team for goal net negative, as blood pressure tolerates.  Would obtain repeat ABG and continue with BiPAP as needed and with naps and at night.  Follow-up BMP  Cardiology consulted appreciate recommendations  Daily weights strict RG monitoring,  Continue with management of COPD as below.  Recommend palliative care consult  COPD exacerbation (HCC)  Continue nebulized bronchodilators budesonide, formoterol  Pulmonary toileting  Continue with Solu-Medrol for today and will switch to prednisone tomorrow.   Obstructive sleep apnea  On Trilogy  NIV at night  C/W BIPAP as above  Hyperlipidemia    Malnutrition (HCC)  Malnutrition Findings:                                 BMI Findings:           Body mass index is 17.74 kg/m².   Consult nutrition  Metabolic encephalopathy  Improving likely in setting of acute on chronic hypercapnic respiratory failure  Patient Aox3,  lethargic but conversant and answering questions appropriately  Severe protein-calorie malnutrition (HCC)  Malnutrition Findings:                                 BMI Findings:           Body mass index is 17.74 kg/m².     Recommendations  Continue with diuresis per primary team for goal net negative, as blood pressure tolerates.  Would obtain repeat ABG and continue with BiPAP as needed and with naps and at night.  Follow-up BMP  Cardiology consulted appreciate recommendations  Daily weights strict RG monitoring,  Continue nebulized bronchodilators budesonide, formoterol, Atrovent/Xopenex  Pulmonary toileting  Continue with Solu-Medrol for today and will switch to prednisone tomorrow.   Recommend palliative care consult    I have discussed the above management plan in detail with the primary service.     History of Present Illness   Natalio Hartmann Jr. is a 69 y.o. male w/ medical history of very severe COPD FEV1 22%, pulmonary cachexia, chronic hypoxic and hypercapnic respiratory failure on 4 L supplemental O2 + Trilogy NIV at night, chronic prednisone 10 mg, nonischemic cardiomyopathy EF 25%, who presents with worsening shortness of breath.   In ED patient found to be hypotensive, lethargic, hypercapnic 7.19/141.7 for which BiPAP placed 20/6 (baseline 7.3/86.8).  Meeting sepsis criteria given ceftriaxone in ED.Patient status post albuterol Atrovent nebs in ED Solu-Medrol 125.    Labs: Pro-Jorge Alberto negative BNP pending.   X-ray reviewed cardiomegaly, pulmonary congestion noted.     Pulmonary consulted for concern for COPD exacerbation  On examination patient down to 5 L via nasal cannula, lethargic but  Aox3, answering questions appropriately.  Reports compliance with torsemide and nebulized bronchodilators. Crackles noted to bases JVD present, hepatomegaly, 2+ pedal edema.    Pertinent pulmonary history  Patient follows with Dr. Luque. Chronically on 10 mg for end-stage COPD.  Referred to palliative care however for co-pay is high.    Review of Systems   Reason unable to perform ROS: Patient lethargic.       Historical Information   Medical History Review: I have reviewed the patient's PMH, PSH, Social History, Family History, Meds, and Allergies   Tobacco History: 100-pack-year smoking history  Occupational History: Unemployed  Family History:non-contributory    Objective :  Temp:  [101 °F (38.3 °C)] 101 °F (38.3 °C)  HR:  [] 86  BP: ()/(55-75) 99/59  Resp:  [14-32] 24  SpO2:  [96 %-100 %] 98 %  O2 Device: EtCO2 nasal cannula  Nasal Cannula O2 Flow Rate (L/min):  [5 L/min] 5 L/min    Physical Exam  Constitutional:       General: He is in acute distress.      Appearance: He is ill-appearing.   HENT:      Head: Normocephalic and atraumatic.   Pulmonary:      Effort: Respiratory distress present.      Breath sounds: Rales present. No wheezing or rhonchi.   Chest:      Chest wall: No tenderness.   Abdominal:      General: There is distension.         Lab Results: I have reviewed the following results:  .     03/14/25  1039 03/14/25  1139   WBC 7.83  --    HGB 11.9*  --    HCT 40.6  --      --    SODIUM 136  --    K 4.6  --    CL 84*  --    CO2 >45*  --    BUN 38*  --    CREATININE 1.15  --    GLUC 132  --    AST 88*  --    ALT 57*  --    ALB 4.2  --    TBILI 0.71  --    ALKPHOS 60  --    PTT  --  22*   INR  --  0.84*   LACTICACID 1.4  --      ABG: No new results in last 24 hours.    Imaging Results Review: I personally reviewed the following image studies/reports in PACS and discussed pertinent findings with Radiology: chest xray. My interpretation of the radiology images/reports is: Chest x-ray  significant for pulmonary congestion bilaterally & cardiomegaly.  Other Study Results Review: No additional pertinent studies reviewed.  PFT Results Reviewed: reviewed    VTE Prophylaxis: Enoxaparin (Lovenox)

## 2025-03-14 NOTE — ASSESSMENT & PLAN NOTE
Encephalopathy present on admission  Hypercapnia hypoxic respiratory failure noted  Optimize metabolic parameters  Avoid neurotoxins  Monitor closely

## 2025-03-14 NOTE — ASSESSMENT & PLAN NOTE
SIRS present on admission  Received empirical ceftriaxone in the ED for possible pneumonia  Follow-up on culture studies  Monitor off antibiotics  Monitor counts temperatures procalcitonin levels

## 2025-03-14 NOTE — ASSESSMENT & PLAN NOTE
Malnutrition Findings:     Body mass index is 17.74 kg/m².   Encourage adequate protein and calorie intake

## 2025-03-14 NOTE — ASSESSMENT & PLAN NOTE
69-year-old male with medical history of very severe COPD FEV1 22%, chronic hypoxic and hypercapnic respiratory failure on 4 L supplemental O2 + Trilogy NIV at night, chronic prednisone 10 mg, COPD cachexia nonischemic cardiomyopathy EF 25%, KEVIN on BiPAP who presents with worsening shortness of breath.   In ED patient found to be hypotensive, lethargic, hypercapnic 7.19/141.7 for which BiPAP placed 20/6 (baseline 7.3/86.8).  Meeting sepsis criteria given ceftriaxone in ED.Patient status post albuterol Atrovent nebs in ED Solu-Medrol 125.    Labs: Pro-Jorge Alberto negative BNP pending.   X-ray reviewed cardiomegaly, pulmonary congestion noted.   On examination patient down to 5 L via nasal cannula, crackles noted to bases JVD present, hepatomegaly, 2+ pedal edema    Presentation likely in setting of CHF exacerbation, with component of COPD.   Continue with diuresis per primary team for goal net negative, as blood pressure tolerates.  Would obtain repeat ABG and continue with BiPAP as needed and with naps and at night.  Follow-up BMP  Cardiology consulted appreciate recommendations  Daily weights strict RG monitoring,  Continue with management of COPD as below.  Recommend palliative care consult

## 2025-03-14 NOTE — ASSESSMENT & PLAN NOTE
COPD with acute exacerbation  IV Solu-Medrol  Azithromycin  Continue respiratory treatments  Supportive cares  Pulmonary following  Communicated pulmonary secure chat

## 2025-03-14 NOTE — RESPIRATORY THERAPY NOTE
RT Protocol Note  Natalio Hartmann Jr. 69 y.o. male MRN: [id number]  Unit/Bed#: QCE Encounter: [id number]    Assessment    Active Problems:  There are no active Hospital Problems.      Home Pulmonary Medications:  albuterol       Past Medical History:   Diagnosis Date    Acute metabolic encephalopathy 2022    Arthritis     Bladder mass     Cardiomyopathy (HCC)     Chest pain     COPD (chronic obstructive pulmonary disease) (HCC)     COVID-19 virus infection 2023    CPAP (continuous positive airway pressure) dependence     Emphysema of lung (HCC)     Hypoxia     nocturnal    Left bundle branch block     Multiple pulmonary nodules     last assessed: 10/12/16    Pneumonia     Sleep apnea, obstructive     Smoker     Weight loss 2019     Social History     Socioeconomic History    Marital status: /Civil Union     Spouse name: None    Number of children: None    Years of education: None    Highest education level: None   Occupational History    None   Tobacco Use    Smoking status: Former     Current packs/day: 0.00     Average packs/day: 2.5 packs/day for 42.0 years (105.0 ttl pk-yrs)     Types: Cigarettes     Start date:      Quit date:      Years since quittin.2    Smokeless tobacco: Former    Tobacco comments:     1 ppd for 37 years, 2010 down to 5 cigs a day, is around second hand smoke   Vaping Use    Vaping status: Never Used   Substance and Sexual Activity    Alcohol use: Not Currently     Comment: rarely    Drug use: No    Sexual activity: Not Currently     Partners: Female   Other Topics Concern    None   Social History Narrative    Daily coffee consumption: 8 or more cups a day        Used to work in [employer].  On disability.     Social Drivers of Health     Financial Resource Strain: Not on file   Food Insecurity: No Food Insecurity (2024)    Nursing - Inadequate Food Risk Classification     Worried About Running Out of Food in the Last Year: Never true     Ran  Out of Food in the Last Year: Never true     Ran Out of Food in the Last Year: Not on file   Transportation Needs: No Transportation Needs (1/27/2024)    PRAPARE - Transportation     Lack of Transportation (Medical): No     Lack of Transportation (Non-Medical): No   Physical Activity: Not on file   Stress: Not on file   Social Connections: Not on file   Intimate Partner Violence: Not on file   Housing Stability: Low Risk  (1/27/2024)    Housing Stability Vital Sign     Unable to Pay for Housing in the Last Year: No     Number of Times Moved in the Last Year: 1     Homeless in the Last Year: No       Subjective         Objective    Physical Exam:   Assessment Type: Assess only  General Appearance: Drowsy  Respiratory Pattern: Dyspnea at rest, Accessory muscle use  Chest Assessment: Chest expansion asymmetrical  Bilateral Breath Sounds: Diminished  Cough: None    Vitals:  Blood pressure 108/67, pulse (!) 108, temperature (!) 101 °F (38.3 °C), temperature source Rectal, resp. rate (!) 24, weight 44 kg (97 lb), SpO2 100%.          Imaging and other studies: Results Review Statement: No pertinent imaging studies reviewed.          Plan    Respiratory Plan: Vent/NIV/HFNC        Resp Comments: pt admitted for resp distress, pt has a pulm hx of copd/KEVIN. pt takes albuterol for home resp, pt found on non rebreather in resp distress using accessory muscles, spo2 is 100%, pt placed on bipap 20/6 rr14, 40% fio2, so2 is 100%, bs are diminished, heart braulio tx given inline, will cont to monitor per resp protocol.

## 2025-03-14 NOTE — ASSESSMENT & PLAN NOTE
Malnutrition Findings:                                 BMI Findings:           Body mass index is 17.74 kg/m².   Consult nutrition

## 2025-03-14 NOTE — ED PROVIDER NOTES
Time reflects when diagnosis was documented in both MDM as applicable and the Disposition within this note       Time User Action Codes Description Comment    3/14/2025  2:16 PM Jarad Conner Add [J96.01,  J96.02] Acute respiratory failure with hypoxia and hypercapnia (HCC)     3/14/2025  2:16 PM Jarad Conner Add [J44.1] COPD exacerbation (HCC)     3/14/2025  3:40 PM Sadie Sung Add [I50.23] Acute on chronic systolic congestive heart failure (HCC)     3/15/2025  6:22 PM Yasmin Bennett Add [J44.9] End stage COPD (HCC)           ED Disposition       ED Disposition   Admit    Condition   Stable    Date/Time   Fri Mar 14, 2025  2:15 PM    Comment                  Assessment & Plan       Medical Decision Making  Patient is a 69-year-old male presenting with acute respiratory distress.    Differential includes but not limited to COPD exacerbation, CHF exacerbation, pneumonia.  Patient treated with bronchodilators with improvement.  Patient placed on BiPAP for respiratory distress.  Patient's VBG gradually improving as well as mental status.  Patient was transitioned off of BiPAP to nasal cannula with end-tidal CO2 which was appropriate.  Patient did have an episode of hypotension and was given a bolus of fluids with improvement of blood pressure.  Patient meeting SIRS criteria so antibiotics were given for possible pneumonia.    Patient admitted for further management and workup.    Amount and/or Complexity of Data Reviewed  Labs: ordered.  Radiology: ordered.    Risk  Prescription drug management.  Decision regarding hospitalization.             Medications   budesonide (PULMICORT) inhalation solution 0.5 mg (0.5 mg Nebulization Given 3/15/25 0906)   acetaminophen (TYLENOL) tablet 650 mg (has no administration in time range)   docusate sodium (COLACE) capsule 100 mg (0 mg Oral Hold 3/15/25 0837)   polyethylene glycol (MIRALAX) packet 17 g (0 g Oral Hold 3/15/25 0837)   ondansetron (ZOFRAN) injection 4  mg (has no administration in time range)   aluminum-magnesium hydroxide-simethicone (MAALOX) oral suspension 30 mL (has no administration in time range)   enoxaparin (LOVENOX) subcutaneous injection 40 mg (40 mg Subcutaneous Given 3/15/25 0852)   levalbuterol (XOPENEX) inhalation solution 1.25 mg (1.25 mg Nebulization Given 3/15/25 0906)   furosemide (LASIX) injection 40 mg (40 mg Intravenous Given 3/15/25 0849)   benzonatate (TESSALON PERLES) capsule 200 mg (0 mg Oral Hold 3/15/25 0836)   dextromethorphan-guaiFENesin (ROBITUSSIN DM) oral syrup 10 mL (has no administration in time range)   ipratropium (ATROVENT) 0.02 % inhalation solution 0.5 mg (0.5 mg Nebulization Given 3/15/25 0906)   formoterol (PERFOROMIST) nebulizer solution 20 mcg (20 mcg Nebulization Given 3/15/25 0907)   methylPREDNISolone sodium succinate (Solu-MEDROL) injection 40 mg (40 mg Intravenous Given 3/15/25 0846)   ceftriaxone (ROCEPHIN) 1 g/50 mL in dextrose IVPB (1,000 mg Intravenous New Bag 3/15/25 0843)   chlorhexidine (PERIDEX) 0.12 % oral rinse 15 mL (0 mL Mouth/Throat Hold 3/15/25 0921)   methylPREDNISolone sodium succinate (FOR EMS ONLY) (Solu-MEDROL) 125 MG injection 125 mg (0 mg Does not apply Given to EMS 3/14/25 1023)   albuterol (FOR EMS ONLY) (2.5 mg/3 mL) 0.083 % inhalation solution 5 mg (0 mg Does not apply Given to EMS 3/14/25 1023)   ipratropium-albuterol (FOR EMS ONLY) (DUO-NEB) 0.5-2.5 mg/3 mL inhalation solution 3 mL (0 mL Does not apply Given to EMS 3/14/25 1023)   albuterol inhalation solution 10 mg (10 mg Nebulization Given by Other 3/14/25 1023)   ipratropium (ATROVENT) 0.02 % inhalation solution 1 mg (1 mg Nebulization Given by Other 3/14/25 1023)   sodium chloride 0.9 % inhalation solution 12 mL (12 mL Nebulization Given by Other 3/14/25 1022)   ceftriaxone (ROCEPHIN) 1 g/50 mL in dextrose IVPB (0 mg Intravenous Stopped 3/14/25 1200)   acetaminophen (Ofirmev) injection 1,000 mg (0 mg Intravenous Stopped 3/14/25 1317)    azithromycin (ZITHROMAX) 500 mg in sodium chloride 0.9% 250mL IVPB 500 mg (0 mg Intravenous Stopped 3/14/25 1317)       ED Risk Strat Scores                    Identification of Seniors at Risk      Flowsheet Row Most Recent Value   (ISAR) Identification of Seniors at Risk    Before the illness or injury that brought you to the Emergency, did you need someone to help you on a regular basis? 0 Filed at: 03/14/2025 1014   In the last 24 hours, have you needed more help than usual? 1 Filed at: 03/14/2025 1014   Have you been hospitalized for one or more nights during the past 6 months? 0 Filed at: 03/14/2025 1014   In general, do you see well? 0 Filed at: 03/14/2025 1014   In general, do you have serious problems with your memory? 0 Filed at: 03/14/2025 1014   Do you take more than three different medications every day? 0 Filed at: 03/14/2025 1014   ISAR Score 1 Filed at: 03/14/2025 1014                                    History of Present Illness       Chief Complaint   Patient presents with    Respiratory Distress     COPD hx. From home with Resp. Distress starting this AM. EMS initial room air sat of 87% with increased WOB. GCS 14 upon arrival to ED.        Past Medical History:   Diagnosis Date    Acute metabolic encephalopathy 03/29/2022    Arthritis     Bladder mass     Cardiomyopathy (HCC)     Chest pain     COPD (chronic obstructive pulmonary disease) (HCC)     COVID-19 virus infection 02/23/2023    CPAP (continuous positive airway pressure) dependence     Emphysema of lung (HCC)     Hypoxia     nocturnal    Left bundle branch block     Multiple pulmonary nodules     last assessed: 10/12/16    Pneumonia     Sleep apnea, obstructive     Smoker     Weight loss 08/29/2019      Past Surgical History:   Procedure Laterality Date    COLONOSCOPY      CYSTOSCOPY      CYSTOSCOPY  02/17/2021    AK BLADDER INSTILLATION ANTICARCINOGENIC AGENT N/A 1/3/2020    Procedure: INSTILLATION MITOMYCIN;  Surgeon: Sundeep Canchola,  MD;  Location: [facility];  Service: Urology    VA CYSTO W/REMOVAL OF LESIONS SMALL N/A 1/3/2020    Procedure: TRANSURETHRAL RESECTION OF BLADDER TUMOR (TURBT);  Surgeon: Sundeep Canchola MD;  Location: [facility];  Service: Urology    VA CYSTOURETHROSCOPY WITH BIOPSY N/A 2021    Procedure: CYSTOSCOPY WITH BIOPSIES;  Surgeon: Sundeep Canchola MD;  Location: [facility];  Service: Urology    VA TRURL ELECTROSURG RESCJ PROSTATE BLEED COMPLETE N/A 2021    Procedure: TRANSURETHRAL RESECTION OF PROSTATE (TURP) BLADDER BIOPSY, FULGURATION;  Surgeon: Sundeep Canchola MD;  Location: [facility];  Service: Urology      Family History   Problem Relation Age of Onset    Diabetes Mother       Social History     Tobacco Use    Smoking status: Former     Current packs/day: 0.00     Average packs/day: 2.5 packs/day for 42.0 years (105.0 ttl pk-yrs)     Types: Cigarettes     Start date:      Quit date:      Years since quittin.2    Smokeless tobacco: Former    Tobacco comments:     1 ppd for 37 years, 2010 down to 5 cigs a day, is around second hand smoke   Vaping Use    Vaping status: Never Used   Substance Use Topics    Alcohol use: Not Currently     Comment: rarely    Drug use: No      E-Cigarette/Vaping    E-Cigarette Use Never User       E-Cigarette/Vaping Substances    Nicotine No     THC No     CBD No     Flavoring No     Other No     Unknown No       I have reviewed and agree with the history as documented.     Patient is a 69-year-old male with significant past medical history of COPD, CHF, pulmonary nodules presenting for acute respiratory distress.  Patient woke up this morning and had significant difficulty with breathing and tripoding, so his wife called EMS.  EMS gave a DuoNeb and 2 rounds of albuterol.  EMS states that when they first got to him he had no air movement at all and after the breathing treatments had diffuse wheezing but improved air movement.  Patient is partially oriented but  not able to give much for history.        Review of Systems        Objective       ED Triage Vitals   Temperature Pulse Blood Pressure Respirations SpO2 Patient Position - Orthostatic VS   03/14/25 1022 03/14/25 1013 03/14/25 1013 03/14/25 1013 03/14/25 1013 03/14/25 1115   (!) 101 °F (38.3 °C) (!) 113 152/75 (!) 30 100 % Lying      Temp Source Heart Rate Source BP Location FiO2 (%) Pain Score    03/14/25 1022 03/14/25 1013 03/14/25 1013 03/15/25 2000 03/15/25 0600    Rectal Monitor Left arm 40 No Pain      Vitals      Date and Time Temp Pulse SpO2 Resp BP Pain Score FACES Pain Rating User   03/17/25 0755 -- -- 99 % -- -- -- --    03/17/25 0600 -- 63 98 % 22 99/59 -- --    03/17/25 0500 -- 67 98 % 24 106/67 -- --    03/17/25 0400 97.4 °F (36.3 °C) 58 98 % 18 103/58 -- -- DG   03/17/25 0300 -- 59 99 % 19 100/62 -- -- DG   03/17/25 0200 -- 58 99 % 18 106/59 -- -- DG   03/17/25 0100 -- 57 99 % 19 98/59 -- -- DG   03/17/25 0000 97.4 °F (36.3 °C) 63 100 % 23 107/64 No Pain -- DG   03/16/25 2300 -- 58 99 % 20 110/64 -- -- DG   03/16/25 2200 -- 56 99 % 18 110/57 -- -- DG   03/16/25 2100 -- 78 99 % 32 107/61 -- -- DG   03/16/25 2000 98.2 °F (36.8 °C) 75 100 % 21 100/61 No Pain -- DG   03/16/25 1800 -- 73 99 % 21 102/61 -- -- NH   03/16/25 1700 -- 89 98 % 31 108/62 -- -- NH   03/16/25 1600 97.2 °F (36.2 °C) 94 98 % 43 117/76 No Pain -- NH   03/16/25 1500 -- 76 99 % 21 103/56 -- -- NH   03/16/25 1400 -- 82 100 % 39 109/63 -- -- NH   03/16/25 1351 -- -- 99 % -- -- -- --    03/16/25 1300 -- 63 98 % 27 122/59 -- -- NH   03/16/25 1200 -- 61 98 % 20 108/60 No Pain -- NH   03/16/25 1128 -- 60 -- -- 98/56 -- -- J   03/16/25 1100 -- 68 97 % 24 106/64 -- -- NH   03/16/25 1000 -- 82 98 % 27 113/67 -- -- NH   03/16/25 0900 -- 101 98 % 37 106/70 -- -- NH   03/16/25 0800 97.3 °F (36.3 °C) 70 98 % 26 109/58 No Pain -- NH   03/16/25 0725 -- -- 97 % -- -- -- --    03/16/25 0700 -- 60 98 % 17 99/54 -- -- NH   03/16/25 0600 -- 60 98  % 20 98/56 -- --    03/16/25 0500 -- 61 99 % 19 99/59 -- --    03/16/25 0400 97.5 °F (36.4 °C) 61 98 % 24 115/59 No Pain --    03/16/25 0300 -- 61 99 % 17 115/63 -- --    03/16/25 0200 -- 64 98 % 20 104/58 -- --    03/16/25 0100 -- 71 99 % 17 100/60 -- --    03/16/25 0000 97.9 °F (36.6 °C) 67 99 % 16 106/62 -- --    03/15/25 2300 -- 73 98 % 17 102/63 -- --    03/15/25 2200 -- 79 99 % 18 101/56 -- --    03/15/25 2100 -- 88 99 % 22 110/76 -- --    03/15/25 2000 98.3 °F (36.8 °C) 73 100 % 17 106/65 No Pain --    03/15/25 1900 -- 73 100 % 24 102/63 -- -- NH   03/15/25 1843 -- 76 99 % 24 -- -- -- NH   03/15/25 1800 -- 78 100 % 38 98/61 -- -- NH   03/15/25 1700 -- 70 100 % 26 93/60 -- -- NH   03/15/25 1650 -- -- 99 % -- -- -- --    03/15/25 1600 98.2 °F (36.8 °C) 75 99 % 28 97/62 -- -- NH   03/15/25 1543 -- -- 98 % -- -- -- --    03/15/25 1541 -- 80 -- 15 -- -- -- NH   03/15/25 1522 -- -- -- -- 90/61 -- -- NH   03/15/25 1500 -- 82 -- 28 89/57 -- -- NH   03/15/25 1406 -- -- 97 % -- -- -- --    03/15/25 1400 -- 81 97 % 27 108/61 -- -- NH   03/15/25 1331 -- -- 98 % -- -- -- --    03/15/25 1300 -- 81 98 % 27 99/61 -- -- NH   03/15/25 1246 -- -- 98 % -- -- -- --    03/15/25 1200 -- 78 99 % 27 98/62 No Pain -- NH   03/15/25 1100 -- 76 99 % 23 109/71 -- -- NH   03/15/25 1030 98.5 °F (36.9 °C) 85 97 % 24 103/62 -- -- NH   03/15/25 0900 -- 102 90 % 31 127/78 -- --    03/15/25 0846 96.5 °F (35.8 °C) -- -- -- -- -- -- AM   03/15/25 0838 -- -- 91 % -- -- -- -- AM   03/15/25 0830 -- 108 78 % 34 -- -- -- AM   03/15/25 0800 -- 88 91 % 27 110/67 -- -- AM   03/15/25 0700 -- 96 93 % 33 102/68 -- -- AM   03/15/25 0600 -- 84 95 % 22 100/63 No Pain -- KG   03/15/25 0300 -- 86 100 % 25 103/67 -- -- KR   03/15/25 0200 -- 88 99 % 32 103/70 -- -- KR   03/15/25 0000 -- 84 98 % 24 106/70 -- -- KR   03/14/25 2200 -- 94 99 % 24 117/63 -- -- KR   03/14/25 2015 -- 90 96 % 30 110/61 -- -- KR   03/14/25 1915 -- 90 96 %  22 102/66 -- -- KR   03/14/25 1700 -- 80 99 % 22 103/63 -- -- EL   03/14/25 1530 -- 86 98 % 24 99/59 -- --    03/14/25 1400 -- 92 Simultaneous filing. User may not have seen previous data. 96 % Simultaneous filing. User may not have seen previous data. 32 Simultaneous filing. User may not have seen previous data. 103/66 Simultaneous filing. User may not have seen previous data. -- --    03/14/25 1345 -- 86 96 % 25 87/57 -- --    03/14/25 1330 -- 84 100 % 14 86/57 -- --    03/14/25 1300 -- 84 100 % 14  85/56 Provider made aware -- --    03/14/25 1200 -- 94 100 % 23 95/56 -- --    03/14/25 1115 -- 86 100 % 14 90/55 -- --    03/14/25 1030 -- 108 100 % 24 108/67 -- --    03/14/25 1022 101 °F (38.3 °C) -- -- -- -- -- --    03/14/25 1013 -- 113 100 % 30 152/75 -- --             Physical Exam  Vitals and nursing note reviewed.   Constitutional:       General: He is in acute distress.      Appearance: He is ill-appearing.      Comments: Thin and frail   HENT:      Head: Normocephalic and atraumatic.   Eyes:      Extraocular Movements: Extraocular movements intact.      Pupils: Pupils are equal, round, and reactive to light.   Cardiovascular:      Rate and Rhythm: Normal rate and regular rhythm.   Pulmonary:      Effort: Respiratory distress present.      Breath sounds: Wheezing (Diffuse) present.   Abdominal:      General: Abdomen is flat.      Palpations: Abdomen is soft.      Tenderness: There is no abdominal tenderness.   Musculoskeletal:      Cervical back: Normal range of motion and neck supple.      Right lower leg: No edema.      Left lower leg: No edema.   Skin:     General: Skin is warm and dry.   Neurological:      Mental Status: He is alert. He is disoriented.         Results Reviewed       Procedure Component Value Units Date/Time    Blood culture #1 [[id number]] Collected: 03/14/25 1039    Lab Status: Preliminary result Specimen: Blood from Arm, Right Updated: 03/16/25 1501     Blood Culture  No Growth at 48 hrs.    Blood culture #2 [[id number]] Collected: 03/14/25 1039    Lab Status: Preliminary result Specimen: Blood from Arm, Right Updated: 03/16/25 1501     Blood Culture No Growth at 48 hrs.    Comprehensive metabolic panel [[id number]]  (Abnormal) Collected: 03/16/25 0509    Lab Status: Final result Specimen: Blood from Arm, Left Updated: 03/16/25 0653     Sodium 144 mmol/L      Potassium 3.9 mmol/L      Chloride 90 mmol/L      CO2 >45 mmol/L      ANION GAP --     BUN 49 mg/dL      Creatinine 0.85 mg/dL      Glucose 113 mg/dL      Calcium 8.2 mg/dL      Corrected Calcium 8.7 mg/dL      AST 63 U/L      ALT 64 U/L      Alkaline Phosphatase 51 U/L      Total Protein 5.8 g/dL      Albumin 3.4 g/dL      Total Bilirubin 0.46 mg/dL      eGFR 90 ml/min/1.73sq m     Narrative:      National Kid[employer] Disease Foundation guidelines for Chronic Kidney Disease (CKD):     Stage 1 with normal or high GFR (GFR > 90 mL/min/1.73 square meters)    Stage 2 Mild CKD (GFR = 60-89 mL/min/1.73 square meters)    Stage 3A Moderate CKD (GFR = 45-59 mL/min/1.73 square meters)    Stage 3B Moderate CKD (GFR = 30-44 mL/min/1.73 square meters)    Stage 4 Severe CKD (GFR = 15-29 mL/min/1.73 square meters)    Stage 5 End Stage CKD (GFR <15 mL/min/1.73 square meters)  Note: GFR calculation is accurate only with a steady state creatinine    Magnesium [[id number]]  (Normal) Collected: 03/16/25 0509    Lab Status: Final result Specimen: Blood from Arm, Left Updated: 03/16/25 0653     Magnesium 2.2 mg/dL     Phosphorus [[id number]]  (Normal) Collected: 03/16/25 0509    Lab Status: Final result Specimen: Blood from Arm, Left Updated: 03/16/25 0653     Phosphorus 3.7 mg/dL     CBC and differential [[id number]]  (Abnormal) Collected: 03/16/25 0509    Lab Status: Final result Specimen: Blood from Arm, Left Updated: 03/16/25 0550     WBC 2.81 Thousand/uL      RBC 3.35 Million/uL      Hemoglobin 10.3 g/dL      Hematocrit 34.8 %       fL       MCH 30.7 pg      MCHC 29.6 g/dL      RDW 13.5 %      MPV 10.1 fL      Platelets 178 Thousands/uL      nRBC 0 /100 WBCs      Segmented % 84 %      Immature Grans % 0 %      Lymphocytes % 8 %      Monocytes % 8 %      Eosinophils Relative 0 %      Basophils Relative 0 %      Absolute Neutrophils 2.38 Thousands/µL      Absolute Immature Grans 0.01 Thousand/uL      Absolute Lymphocytes 0.21 Thousands/µL      Absolute Monocytes 0.21 Thousand/µL      Eosinophils Absolute 0.00 Thousand/µL      Basophils Absolute 0.00 Thousands/µL     Blood gas, venous [[id number]]  (Abnormal) Collected: 03/15/25 0727    Lab Status: Final result Specimen: Blood from Line, Venous Updated: 03/15/25 0802     pH, Oscar 7.374     pCO2, Oscar 79.1 mm Hg      pO2, Oscar 131.5 mm Hg      HCO3, Oscar 45.1 mmol/L      Base Excess, Oscar 16.4 mmol/L      O2 Content, Oscar 16.2 ml/dL      O2 HGB, VENOUS 96.3 %     Procalcitonin [[id number]]  (Abnormal) Collected: 03/15/25 0414    Lab Status: Final result Specimen: Blood from Arm, Left Updated: 03/15/25 0452     Procalcitonin 0.42 ng/ml     Comprehensive metabolic panel [[id number]]  (Abnormal) Collected: 03/15/25 0414    Lab Status: Final result Specimen: Blood from Arm, Left Updated: 03/15/25 0440     Sodium 139 mmol/L      Potassium 4.4 mmol/L      Chloride 91 mmol/L      CO2 44 mmol/L      ANION GAP 4 mmol/L      BUN 36 mg/dL      Creatinine 0.75 mg/dL      Glucose 101 mg/dL      Calcium 8.2 mg/dL       U/L      ALT 88 U/L      Alkaline Phosphatase 52 U/L      Total Protein 5.9 g/dL      Albumin 3.6 g/dL      Total Bilirubin 0.41 mg/dL      eGFR 94 ml/min/1.73sq m     Narrative:      National Kidney Disease Foundation guidelines for Chronic Kidney Disease (CKD):     Stage 1 with normal or high GFR (GFR > 90 mL/min/1.73 square meters)    Stage 2 Mild CKD (GFR = 60-89 mL/min/1.73 square meters)    Stage 3A Moderate CKD (GFR = 45-59 mL/min/1.73 square meters)    Stage 3B Moderate CKD (GFR = 30-44 mL/min/1.73  square meters)    Stage 4 Severe CKD (GFR = 15-29 mL/min/1.73 square meters)    Stage 5 End Stage CKD (GFR <15 mL/min/1.73 square meters)  Note: GFR calculation is accurate only with a steady state creatinine    Magnesium [[id number]]  (Normal) Collected: 03/15/25 0414    Lab Status: Final result Specimen: Blood from Arm, Left Updated: 03/15/25 0440     Magnesium 2.1 mg/dL     Phosphorus [[id number]]  (Abnormal) Collected: 03/15/25 0414    Lab Status: Final result Specimen: Blood from Arm, Left Updated: 03/15/25 0440     Phosphorus 4.7 mg/dL     CBC and differential [[id number]]  (Abnormal) Collected: 03/15/25 0414    Lab Status: Final result Specimen: Blood from Arm, Left Updated: 03/15/25 0430     WBC 4.48 Thousand/uL      RBC 3.59 Million/uL      Hemoglobin 10.9 g/dL      Hematocrit 37.6 %       fL      MCH 30.4 pg      MCHC 29.0 g/dL      RDW 13.3 %      MPV 9.6 fL      Platelets 168 Thousands/uL      nRBC 0 /100 WBCs      Segmented % 76 %      Immature Grans % 0 %      Lymphocytes % 8 %      Monocytes % 16 %      Eosinophils Relative 0 %      Basophils Relative 0 %      Absolute Neutrophils 3.39 Thousands/µL      Absolute Immature Grans 0.02 Thousand/uL      Absolute Lymphocytes 0.34 Thousands/µL      Absolute Monocytes 0.72 Thousand/µL      Eosinophils Absolute 0.00 Thousand/µL      Basophils Absolute 0.01 Thousands/µL     FLU/RSV/COVID - if FLU/RSV clinically relevant [[id number]]  (Normal) Collected: 03/14/25 2384    Lab Status: Final result Specimen: Nares from Nose Updated: 03/15/25 0029     SARS-CoV-2 Negative     INFLUENZA A PCR Negative     INFLUENZA B PCR Negative     RSV PCR Negative    Narrative:      This test has been performed using the CoV-2/Flu/RSV plus assay on the [employer] GeneXpert platform. This test has been validated by the  and verified by the performing laboratory.     This test is designed to amplify and detect the following: nucleocapsid (N), envelope (E), and  RNA-dependent RNA polymerase (RdRP) genes of the SARS-CoV-2 genome; matrix (M), basic polymerase (PB2), and acidic protein (PA) segments of the influenza A genome; matrix (M) and non-structural protein (NS) segments of the influenza B genome, and the nucleocapsid genes of RSV A and RSV B.     Positive results are indicative of the presence of Flu A, Flu B, RSV, and/or SARS-CoV-2 RNA. Positive results for SARS-CoV-2 or suspected novel influenza should be reported to state, local, or federal health departments according to local reporting requirements.      All results should be assessed in conjunction with clinical presentation and other laboratory markers for clinical management.     FOR PEDIATRIC PATIENTS - copy/paste COVID Guidelines URL to browser: [email or web address]       B-Type Natriuretic Peptide(BNP) [[id number]]  (Abnormal) Collected: 03/14/25 2348    Lab Status: Final result Specimen: Blood from Arm, Right Updated: 03/15/25 0024     BNP 1,079 pg/mL     Platelet count [[id number]]  (Normal) Collected: 03/14/25 2348    Lab Status: Final result Specimen: Blood from Arm, Right Updated: 03/14/25 2358     Platelets 174 Thousands/uL      MPV 10.0 fL     Hemoglobin A1C [[id number]]  (Abnormal) Collected: 03/14/25 1039    Lab Status: Final result Specimen: Blood from Arm, Right Updated: 03/14/25 2149     Hemoglobin A1C 6.1 %       mg/dl     Blood gas, venous [[id number]]  (Abnormal) Collected: 03/14/25 1211    Lab Status: Final result Specimen: Blood from Arm, Right Updated: 03/14/25 1237     pH, Oscar 7.253     pCO2, Oscar 104.6 mm Hg      pO2, Oscar 31.5 mm Hg      HCO3, Oscar 45.2 mmol/L      Base Excess, Oscar 14.2 mmol/L      O2 Content, Oscar 7.9 ml/dL      O2 HGB, VENOUS 49.9 %     Protime-INR [[id number]]  (Abnormal) Collected: 03/14/25 1139    Lab Status: Final result Specimen: Blood from Arm, Right Updated: 03/14/25 1211     Protime 11.9 seconds      INR 0.84     Narrative:      INR Therapeutic Range    Indication                                             INR Range      Atrial Fibrillation                                               2.0-3.0  Hypercoagulable State                                    2.0.2.3  Left Ventricular Asist Device                            2.0-3.0  Mechanical Heart Valve                                  -    Aortic(with afib, MI, embolism, HF, LA enlargement,    and/or coagulopathy)                                     2.0-3.0 (2.5-3.5)     Mitral                                                             2.5-3.5  Prosthetic/Bioprosthetic Heart Valve               2.0-3.0  Venous thromboembolism (VTE: VT, PE        2.0-3.0    APTT [[id number]]  (Abnormal) Collected: 03/14/25 1139    Lab Status: Final result Specimen: Blood from Arm, Right Updated: 03/14/25 1211     PTT 22 seconds     Blood gas, venous [[id number]]  (Abnormal) Collected: 03/14/25 1043    Lab Status: Final result Specimen: Blood from Arm, Right Updated: 03/14/25 1129     pH, Oscar 7.199     pCO2, Oscar 141.7 mm Hg      pO2, Oscar 32.4 mm Hg      HCO3, Oscar 54.0 mmol/L      Base Excess, Oscar 20.0 mmol/L      O2 Content, Oscar 8.5 ml/dL      O2 HGB, VENOUS 49.6 %     Procalcitonin [[id number]]  (Normal) Collected: 03/14/25 1039    Lab Status: Final result Specimen: Blood from Arm, Right Updated: 03/14/25 1128     Procalcitonin 0.21 ng/ml     Comprehensive metabolic panel [[id number]]  (Abnormal) Collected: 03/14/25 1039    Lab Status: Final result Specimen: Blood from Arm, Right Updated: 03/14/25 1122     Sodium 136 mmol/L      Potassium 4.6 mmol/L      Chloride 84 mmol/L      CO2 >45 mmol/L      ANION GAP --     BUN 38 mg/dL      Creatinine 1.15 mg/dL      Glucose 132 mg/dL      Calcium 8.7 mg/dL      AST 88 U/L      ALT 57 U/L      Alkaline Phosphatase 60 U/L      Total Protein 7.2 g/dL      Albumin 4.2 g/dL      Total Bilirubin 0.71 mg/dL      eGFR 65 ml/min/1.73sq m     Narrative:       National Kidney Disease Foundation guidelines for Chronic Kidney Disease (CKD):     Stage 1 with normal or high GFR (GFR > 90 mL/min/1.73 square meters)    Stage 2 Mild CKD (GFR = 60-89 mL/min/1.73 square meters)    Stage 3A Moderate CKD (GFR = 45-59 mL/min/1.73 square meters)    Stage 3B Moderate CKD (GFR = 30-44 mL/min/1.73 square meters)    Stage 4 Severe CKD (GFR = 15-29 mL/min/1.73 square meters)    Stage 5 End Stage CKD (GFR <15 mL/min/1.73 square meters)  Note: GFR calculation is accurate only with a steady state creatinine    Lactic acid [[id number]]  (Normal) Collected: 03/14/25 1039    Lab Status: Final result Specimen: Blood from Arm, Right Updated: 03/14/25 1117     LACTIC ACID 1.4 mmol/L     Narrative:      Result may be elevated if tourniquet was used during collection.    CBC and differential [[id number]]  (Abnormal) Collected: 03/14/25 1039    Lab Status: Final result Specimen: Blood from Arm, Right Updated: 03/14/25 1059     WBC 7.83 Thousand/uL      RBC 3.87 Million/uL      Hemoglobin 11.9 g/dL      Hematocrit 40.6 %       fL      MCH 30.7 pg      MCHC 29.3 g/dL      RDW 13.4 %      MPV 10.0 fL      Platelets 207 Thousands/uL      nRBC 0 /100 WBCs      Segmented % 75 %      Immature Grans % 0 %      Lymphocytes % 9 %      Monocytes % 16 %      Eosinophils Relative 0 %      Basophils Relative 0 %      Absolute Neutrophils 5.76 Thousands/µL      Absolute Immature Grans 0.03 Thousand/uL      Absolute Lymphocytes 0.72 Thousands/µL      Absolute Monocytes 1.28 Thousand/µL      Eosinophils Absolute 0.01 Thousand/µL      Basophils Absolute 0.03 Thousands/µL             XR chest portable   Final Interpretation by Suhas Graham MD (03/14 1320)      Cardiomegaly and prominent vasculature, possibly related to positioning though pulmonary vascular congestion is not excluded. Clinical correlation for congestive heart failure recommended.            Workstation performed: [id number]              Procedures    ED Medication and Procedure Management   Prior to Admission Medications   Prescriptions Last Dose Informant Patient Reported? Taking?   CALCIUM PO  Spouse/Significant Other, Self Yes No   Sig: Take by mouth   Nebulizers (InnoSpire Essence Nebulizer) MISC  Self, Spouse/Significant Other Yes No   Sig: Use as directed   Patient not taking: Reported on 2/21/2025   albuterol (PROVENTIL HFA,VENTOLIN HFA) 90 mcg/act inhaler  Self, Spouse/Significant Other No No   Sig: Inhale 2 puffs every 6 (six) hours as needed for wheezing or shortness of breath   budesonide (PULMICORT) 0.5 mg/2 mL nebulizer solution   No No   Sig: TAKE 2 ML (0.5 MG TOTAL) BY NEBULIZATION TWICE A DAY RINSE MOUTH AFTER USE   ferrous sulfate 324 (65 Fe) mg  Self, Spouse/Significant Other No No   Sig: Take 1 tablet (324 mg total) by mouth daily before breakfast   Patient not taking: Reported on 2/21/2025   folic acid (FOLVITE) 400 mcg tablet  Self, Spouse/Significant Other No No   Sig: Take 1 tablet (400 mcg total) by mouth daily   Patient not taking: Reported on 2/21/2025   ipratropium-albuterol (DUO-NEB) 0.5-2.5 mg/3 mL nebulizer solution   No No   Sig: Take 3 mL by nebulization 4 (four) times a day   predniSONE 10 mg tablet   No No   Sig: Take 1 tablet (10 mg total) by mouth daily   torsemide (DEMADEX) 20 mg tablet   No No   Sig: TAKE 1 TABLET ONE DAY, ALTERNATING WITH 2 TABLETS THE NEXT      Facility-Administered Medications: None     Current Discharge Medication List        CONTINUE these medications which have NOT CHANGED    Details   albuterol (PROVENTIL HFA,VENTOLIN HFA) 90 mcg/act inhaler Inhale 2 puffs every 6 (six) hours as needed for wheezing or shortness of breath  Qty: 18 g, Refills: 6    Associated Diagnoses: End stage COPD (HCC)      budesonide (PULMICORT) 0.5 mg/2 mL nebulizer solution TAKE 2 ML (0.5 MG TOTAL) BY NEBULIZATION TWICE A DAY RINSE MOUTH AFTER USE  Qty: 1080 mL, Refills: 1    Associated Diagnoses: End stage  COPD (HCC)      CALCIUM PO Take by mouth      ferrous sulfate 324 (65 Fe) mg Take 1 tablet (324 mg total) by mouth daily before breakfast  Qty: 90 tablet, Refills: 1    Associated Diagnoses: Iron deficiency anemia secondary to inadequate dietary iron intake      folic acid (FOLVITE) 400 mcg tablet Take 1 tablet (400 mcg total) by mouth daily  Qty: 90 tablet, Refills: 1    Associated Diagnoses: Iron deficiency anemia secondary to inadequate dietary iron intake      ipratropium-albuterol (DUO-NEB) 0.5-2.5 mg/3 mL nebulizer solution Take 3 mL by nebulization 4 (four) times a day  Qty: 360 mL, Refills: 5    Associated Diagnoses: End stage COPD (HCC)      Nebulizers (InnoSpire Essence Nebulizer) MISC Use as directed      predniSONE 10 mg tablet Take 1 tablet (10 mg total) by mouth daily  Qty: 30 tablet, Refills: 6    Comments: DX Code Needed  .  Associated Diagnoses: End stage COPD (HCC)      torsemide (DEMADEX) 20 mg tablet TAKE 1 TABLET ONE DAY, ALTERNATING WITH 2 TABLETS THE NEXT  Qty: 180 tablet, Refills: 1    Comments: DX Code Needed  .  Associated Diagnoses: Chronic HFrEF (heart failure with reduced ejection fraction) ([facility])           No discharge procedures on file.  ED SEPSIS DOCUMENTATION   Time reflects when diagnosis was documented in both MDM as applicable and the Disposition within this note       Time User Action Codes Description Comment    3/14/2025  2:16 PM Jarad Conner Add [J96.01,  J96.02] Acute respiratory failure with hypoxia and hypercapnia (HCC)     3/14/2025  2:16 PM Jarad Conner Add [J44.1] COPD exacerbation (HCC)     3/14/2025  3:40 PM Sadie Sung Add [I50.23] Acute on chronic systolic congestive heart failure (HCC)     3/15/2025  6:22 PM Yasmin Bennett Add [J44.9] End stage COPD (HCC)            Initial Sepsis Screening       Row Name 03/14/25 5932                Is the patient's history suggestive of a new or worsening infection? Yes (Proceed)  -BM        Suspected source of  "infection pneumonia  -BM        Indicate SIRS criteria Hyperthemia > 38.3C (100.9F) OR Hypothermia <36C (96.8F);Tachycardia > 90 bpm;Tachypnea > 20 resp per min  -BM        Are two or more of the above signs & symptoms of infection both present and new to the patient? Yes (Proceed)  -BM        Assess for evidence of organ dysfunction: Are any of the below criteria present within 6 hours of suspected infection and SIRS criteria that are NOT considered to be chronic conditions? --                  User Key  (r) = Recorded By, (t) = Taken By, (c) = Cosigned By      Initials Name Provider Type     Jarad Conner MD Resident                  Default Flowsheet Data (Last 720 Hours)       Sepsis Reassess       Row Name 03/14/25 1505                   Repeat Volume Status and Tissue Perfusion Assessment Performed    Date of Reassessment: 03/14/25  -        Time of Reassessment: 1506  -BM        Sepsis Reassessment Note: Click \"NEXT\" below (NOT \"close\") to generate sepsis reassessment note. YES (proceed by clicking \"NEXT\")  -        Repeat Volume Status and Tissue Perfusion Assessment Performed --                  User Key  (r) = Recorded By, (t) = Taken By, (c) = Cosigned By      Initials Name Provider Type     Jarad Conner MD Resident                     Jarad Conner MD  03/17/25 0847    "

## 2025-03-14 NOTE — ASSESSMENT & PLAN NOTE
Acute on chronic hypoxic and hypercapnic respiratory failure  Required BiPAP in the ER  Multifactorial with COPD and CHF exacerbation contributing  BiPAP as needed and at night  Continue supplemental oxygen to keep at sats more than 88%  Encourage incentive spirometry as able  Pulmonary following

## 2025-03-14 NOTE — ASSESSMENT & PLAN NOTE
Continue nebulized bronchodilators budesonide, formoterol  Pulmonary toileting  Continue with Solu-Medrol for today and will switch to prednisone tomorrow.

## 2025-03-14 NOTE — H&P
H&P - Hospitalist   Name: Natalio Hartmann Jr. 69 y.o. male I MRN: [id number]  Unit/Bed#: QCE I Date of Admission: 3/14/2025   Date of Service: 3/14/2025 I Hospital Day: 0     Assessment & Plan  Acute on chronic respiratory failure with hypoxia and hypercapnia (HCC)  Acute on chronic hypoxic and hypercapnic respiratory failure  Required BiPAP in the ER  Multifactorial with COPD and CHF exacerbation contributing  BiPAP as needed and at night  Continue supplemental oxygen to keep at sats more than 88%  Encourage incentive spirometry as able  Pulmonary following  Acute on chronic systolic congestive heart failure (HCC)  Wt Readings from Last 3 Encounters:   03/14/25 44 kg (97 lb)   02/21/25 44.3 kg (97 lb 11.2 oz)   09/06/24 44.9 kg (99 lb)     Patient with history of nonischemic cardiomyopathy EF 25%  Presents with worsening shortness of breath volume load state noted  IV Lasix per heart failure treatment protocol  Low-salt diet  Cardiology following  COPD exacerbation (HCC)  COPD with acute exacerbation  IV Solu-Medrol  Azithromycin  Continue respiratory treatments  Supportive cares  Pulmonary following  Communicated pulmonary secure chat  SIRS (systemic inflammatory response syndrome)  SIRS present on admission  Received empirical ceftriaxone in the ED for possible pneumonia  Follow-up on culture studies  Monitor off antibiotics  Monitor counts temperatures procalcitonin levels  Obstructive sleep apnea  BiPAP as needed and at bedtime  Metabolic encephalopathy  Encephalopathy present on admission  Hypercapnia hypoxic respiratory failure noted  Optimize metabolic parameters  Avoid neurotoxins  Monitor closely  Severe protein-calorie malnutrition (HCC)  Malnutrition Findings:     Body mass index is 17.74 kg/m².   Encourage adequate protein and calorie intake  Goals of care, counseling/discussion  Patient with multiple comorbidities overall guarded prognosis discussed with spouse at bedside        VTE Pharmacologic  Prophylaxis: VTE Score: 8 High Risk (Score >/= 5) - Pharmacological DVT Prophylaxis Ordered: enoxaparin (Lovenox). Sequential Compression Devices Ordered.  Code Status: Level 1 - Full Code     Discussion with family:  Discussed with the patient, spouse at bedside updated in detail questions answered.  Overall guarded prognosis explained.     Anticipated Length of Stay: Patient will be admitted on an inpatient basis with an anticipated length of stay of greater than 2 midnights secondary to acute on chronic hypoxic hypercapnic respiratory failure, acute on chronic CHF, COPD exacerbation for management as outlined.    History of Present Illness   Chief Complaint:     Worsening shortness of breath    Natalio Hartmann Jr. is a 69 y.o. male with a PMH of severe COPD, chronic hypoxic and hypercapnic respiratory failure on 4 L supplemental oxygen, cachexia, nonischemic cardiomyopathy, chronic systolic CHF who presents with worsening shortness of breath.    Since yesterday patient is feeling increasingly lethargic feeling tired and fatigued.  Today he was visibly short of breath slumped in the chair noted by his wife and brought to the ER.  In the ED he is noted to be hypoxic and hypercapnic requiring BiPAP he is being admitted for further management.    Patient presently somnolent unable to contribute to history taking, history is obtained from his wife at bedside as well as review of chart.    Today he is increasingly lethargic with difficulty breathing, he is having cough unable to expectorate.  Wife reports respiratory treatments at home did not provide much relief.  She reports compliance with BiPAP.    Outpatient notes reviewed in [employer]  History chart labs medications reviewed    Review of Systems   Unable to perform ROS: Mental status change       Historical Information   Past Medical History:   Diagnosis Date    Acute metabolic encephalopathy 03/29/2022    Arthritis     Bladder mass     Cardiomyopathy (HCC)     Chest pain      COPD (chronic obstructive pulmonary disease) (HCC)     COVID-19 virus infection 2023    CPAP (continuous positive airway pressure) dependence     Emphysema of lung (HCC)     Hypoxia     nocturnal    Left bundle branch block     Multiple pulmonary nodules     last assessed: 10/12/16    Pneumonia     Sleep apnea, obstructive     Smoker     Weight loss 2019     Past Surgical History:   Procedure Laterality Date    COLONOSCOPY      CYSTOSCOPY      CYSTOSCOPY  2021    WI BLADDER INSTILLATION ANTICARCINOGENIC AGENT N/A 1/3/2020    Procedure: INSTILLATION MITOMYCIN;  Surgeon: Sundeep Canchola MD;  Location: [facility];  Service: Urology    WI CYSTO W/REMOVAL OF LESIONS SMALL N/A 1/3/2020    Procedure: TRANSURETHRAL RESECTION OF BLADDER TUMOR (TURBT);  Surgeon: Sundeep Canchola MD;  Location: [facility];  Service: Urology    WI CYSTOURETHROSCOPY WITH BIOPSY N/A 2021    Procedure: CYSTOSCOPY WITH BIOPSIES;  Surgeon: Sundeep Canchola MD;  Location: [facility];  Service: Urology    WI TRURL ELECTROSURG RESCJ PROSTATE BLEED COMPLETE N/A 2021    Procedure: TRANSURETHRAL RESECTION OF PROSTATE (TURP) BLADDER BIOPSY, FULGURATION;  Surgeon: Sundeep Canchola MD;  Location: [facility];  Service: Urology     Social History     Tobacco Use    Smoking status: Former     Current packs/day: 0.00     Average packs/day: 2.5 packs/day for 42.0 years (105.0 ttl pk-yrs)     Types: Cigarettes     Start date:      Quit date: 2012     Years since quittin.2    Smokeless tobacco: Former    Tobacco comments:     1 ppd for 37 years, 2010 down to 5 cigs a day, is around second hand smoke   Vaping Use    Vaping status: Never Used   Substance and Sexual Activity    Alcohol use: Not Currently     Comment: rarely    Drug use: No    Sexual activity: Not Currently     Partners: Female     E-Cigarette/Vaping    E-Cigarette Use Never User      E-Cigarette/Vaping Substances    Nicotine No     THC No     CBD No      Flavoring No     Other No     Unknown No      Family History   Problem Relation Age of Onset    Diabetes Mother      Social History:  Marital Status: /Civil Union   Occupation:   Patient Pre-hospital Living Situation: Home  Patient Pre-hospital Level of Mobility: Ambulatory dysfunction  Patient Pre-hospital Diet Restrictions: no    Meds/Allergies   I have reviewed home medications with patient family member.  Prior to Admission medications    Medication Sig Start Date End Date Taking? Authorizing Provider   albuterol (PROVENTIL HFA,VENTOLIN HFA) 90 mcg/act inhaler Inhale 2 puffs every 6 (six) hours as needed for wheezing or shortness of breath 4/16/24   Malia Luque DO   budesonide (PULMICORT) 0.5 mg/2 mL nebulizer solution TAKE 2 ML (0.5 MG TOTAL) BY NEBULIZATION TWICE A DAY RINSE MOUTH AFTER USE 8/28/24   DELIA Ackerman   CALCIUM PO Take by mouth    Historical Provider, MD   ferrous sulfate 324 (65 Fe) mg Take 1 tablet (324 mg total) by mouth daily before breakfast  Patient not taking: Reported on 2/21/2025 5/15/24   Joselyn Reyes Bahamonde, MD   folic acid (FOLVITE) 400 mcg tablet Take 1 tablet (400 mcg total) by mouth daily  Patient not taking: Reported on 2/21/2025 5/15/24   Joselyn Reyes Bahamonde, MD   ipratropium-albuterol (DUO-NEB) 0.5-2.5 mg/3 mL nebulizer solution Take 3 mL by nebulization 4 (four) times a day 8/21/24   DELIA Mazariegos   Nebulizers (InnoSpire Essence Nebulizer) MISC Use as directed  Patient not taking: Reported on 2/21/2025 2/8/24   Historical Provider, MD   predniSONE 10 mg tablet Take 1 tablet (10 mg total) by mouth daily 2/27/25   Malia Luque DO   torsemide (DEMADEX) 20 mg tablet TAKE 1 TABLET ONE DAY, ALTERNATING WITH 2 TABLETS THE NEXT 12/24/24   Nguyễn Funez MD   budesonide-formoterol (Symbicort) 160-4.5 mcg/act inhaler Inhale 2 puffs 2 (two) times a day Rinse mouth after use. 8/29/22 8/29/22  Rocky Lino DO   mometasone-formoterol  (Dulera) 200-5 MCG/ACT inhaler Inhale 2 puffs 2 (two) times a day Rinse mouth after use. 8/29/22 8/29/22  Rocky Lino, DO     No Known Allergies    Objective :  Temp:  [101 °F (38.3 °C)] 101 °F (38.3 °C)  HR:  [] 86  BP: ()/(55-75) 99/59  Resp:  [14-32] 24  SpO2:  [96 %-100 %] 98 %  O2 Device: EtCO2 nasal cannula  Nasal Cannula O2 Flow Rate (L/min):  [5 L/min] 5 L/min    Physical Exam     Patient appears lethargic  Tachypnea noted  Asthenia noted  Features of severe protein calorie malnutrition noted  Neck supple  JVD elevated  Lungs emphysematous  Diminished breath sounds  Crackles noted  Heart sounds S1-S2 noted  Abdomen soft nontender  Bilateral edema noted  No rash    Lines/Drains:            Lab Results: I have reviewed the following results:  Results from last 7 days   Lab Units 03/14/25  1039   WBC Thousand/uL 7.83   HEMOGLOBIN g/dL 11.9*   HEMATOCRIT % 40.6   PLATELETS Thousands/uL 207   SEGS PCT % 75   LYMPHO PCT % 9*   MONO PCT % 16*   EOS PCT % 0     Results from last 7 days   Lab Units 03/14/25  1039   SODIUM mmol/L 136   POTASSIUM mmol/L 4.6   CHLORIDE mmol/L 84*   CO2 mmol/L >45*   BUN mg/dL 38*   CREATININE mg/dL 1.15   CALCIUM mg/dL 8.7   ALBUMIN g/dL 4.2   TOTAL BILIRUBIN mg/dL 0.71   ALK PHOS U/L 60   ALT U/L 57*   AST U/L 88*   GLUCOSE RANDOM mg/dL 132     Results from last 7 days   Lab Units 03/14/25  1139   INR  0.84*         Lab Results   Component Value Date    HGBA1C 6.5 (H) 08/30/2023    HGBA1C 5.7 (H) 07/20/2023    HGBA1C 5.4 02/14/2023     Results from last 7 days   Lab Units 03/14/25  1039   LACTIC ACID mmol/L 1.4   PROCALCITONIN ng/ml 0.21       Imaging Results Review: I personally reviewed the following image studies/reports in PACS and discussed pertinent findings with Radiology: chest xray and Echocardiogram. My interpretation of the radiology images/reports is: Lab results reviewed.  EKG pending      ** Please Note: This note has been constructed using a voice  recognition system. **

## 2025-03-14 NOTE — ASSESSMENT & PLAN NOTE
Improving likely in setting of acute on chronic hypercapnic respiratory failure  Patient Aox3,  lethargic but conversant and answering questions appropriately

## 2025-03-14 NOTE — SEPSIS NOTE
Sepsis Note   Natalio Hartmann Jr. 69 y.o. male MRN: [id number]  Unit/Bed#: QCE Encounter: [id number]       Initial Sepsis Screening       Row Name 03/14/25 1115                Is the patient's history suggestive of a new or worsening infection? Yes (Proceed)  -BM        Suspected source of infection pneumonia  -BM        Indicate SIRS criteria Hyperthemia > 38.3C (100.9F) OR Hypothermia <36C (96.8F);Tachycardia > 90 bpm;Tachypnea > 20 resp per min  -BM        Are two or more of the above signs & symptoms of infection both present and new to the patient? Yes (Proceed)  -BM        Assess for evidence of organ dysfunction: Are any of the below criteria present within 6 hours of suspected infection and SIRS criteria that are NOT considered to be chronic conditions? --                  User Key  (r) = Recorded By, (t) = Taken By, (c) = Cosigned By      Initials Name Provider Type    BM Jarad Conner MD Resident                        Body mass index is 17.74 kg/m².  Wt Readings from Last 1 Encounters:   03/14/25 44 kg (97 lb)        Ideal body weight: 57.1 kg (125 lb 13.8 oz)

## 2025-03-14 NOTE — ASSESSMENT & PLAN NOTE
Wt Readings from Last 3 Encounters:   03/14/25 44 kg (97 lb)   02/21/25 44.3 kg (97 lb 11.2 oz)   09/06/24 44.9 kg (99 lb)     Patient with history of nonischemic cardiomyopathy EF 25%  Presents with worsening shortness of breath volume load state noted  IV Lasix per heart failure treatment protocol  Low-salt diet  Cardiology following

## 2025-03-15 LAB
ALBUMIN SERPL BCG-MCNC: 3.6 G/DL (ref 3.5–5)
ALP SERPL-CCNC: 52 U/L (ref 34–104)
ALT SERPL W P-5'-P-CCNC: 88 U/L (ref 7–52)
ANION GAP SERPL CALCULATED.3IONS-SCNC: 4 MMOL/L (ref 4–13)
AST SERPL W P-5'-P-CCNC: 118 U/L (ref 13–39)
ATRIAL RATE: 208 BPM
BASE EX.OXY STD BLDV CALC-SCNC: 83.8 % (ref 60–80)
BASE EX.OXY STD BLDV CALC-SCNC: 92.3 % (ref 60–80)
BASE EX.OXY STD BLDV CALC-SCNC: 96.3 % (ref 60–80)
BASE EXCESS BLDV CALC-SCNC: 16.2 MMOL/L
BASE EXCESS BLDV CALC-SCNC: 16.4 MMOL/L
BASE EXCESS BLDV CALC-SCNC: 19.3 MMOL/L
BASOPHILS # BLD AUTO: 0.01 THOUSANDS/ÂΜL (ref 0–0.1)
BASOPHILS NFR BLD AUTO: 0 % (ref 0–1)
BILIRUB SERPL-MCNC: 0.41 MG/DL (ref 0.2–1)
BNP SERPL-MCNC: 1079 PG/ML (ref 0–100)
BUN SERPL-MCNC: 36 MG/DL (ref 5–25)
CALCIUM SERPL-MCNC: 8.2 MG/DL (ref 8.4–10.2)
CHLORIDE SERPL-SCNC: 91 MMOL/L (ref 96–108)
CO2 SERPL-SCNC: 44 MMOL/L (ref 21–32)
CREAT SERPL-MCNC: 0.75 MG/DL (ref 0.6–1.3)
EOSINOPHIL # BLD AUTO: 0 THOUSAND/ÂΜL (ref 0–0.61)
EOSINOPHIL NFR BLD AUTO: 0 % (ref 0–6)
ERYTHROCYTE [DISTWIDTH] IN BLOOD BY AUTOMATED COUNT: 13.3 % (ref 11.6–15.1)
FLUAV RNA RESP QL NAA+PROBE: NEGATIVE
FLUBV RNA RESP QL NAA+PROBE: NEGATIVE
GFR SERPL CREATININE-BSD FRML MDRD: 94 ML/MIN/1.73SQ M
GLUCOSE SERPL-MCNC: 101 MG/DL (ref 65–140)
HCO3 BLDV-SCNC: 45.1 MMOL/L (ref 24–30)
HCO3 BLDV-SCNC: 46.1 MMOL/L (ref 24–30)
HCO3 BLDV-SCNC: 49.4 MMOL/L (ref 24–30)
HCT VFR BLD AUTO: 37.6 % (ref 36.5–49.3)
HGB BLD-MCNC: 10.9 G/DL (ref 12–17)
IMM GRANULOCYTES # BLD AUTO: 0.02 THOUSAND/UL (ref 0–0.2)
IMM GRANULOCYTES NFR BLD AUTO: 0 % (ref 0–2)
IPAP: 16
LYMPHOCYTES # BLD AUTO: 0.34 THOUSANDS/ÂΜL (ref 0.6–4.47)
LYMPHOCYTES NFR BLD AUTO: 8 % (ref 14–44)
MAGNESIUM SERPL-MCNC: 2.1 MG/DL (ref 1.9–2.7)
MCH RBC QN AUTO: 30.4 PG (ref 26.8–34.3)
MCHC RBC AUTO-ENTMCNC: 29 G/DL (ref 31.4–37.4)
MCV RBC AUTO: 105 FL (ref 82–98)
MONOCYTES # BLD AUTO: 0.72 THOUSAND/ÂΜL (ref 0.17–1.22)
MONOCYTES NFR BLD AUTO: 16 % (ref 4–12)
NEUTROPHILS # BLD AUTO: 3.39 THOUSANDS/ÂΜL (ref 1.85–7.62)
NEUTS SEG NFR BLD AUTO: 76 % (ref 43–75)
NON VENT- BIPAP: ABNORMAL
NRBC BLD AUTO-RTO: 0 /100 WBCS
O2 CT BLDV-SCNC: 13.7 ML/DL
O2 CT BLDV-SCNC: 15.2 ML/DL
O2 CT BLDV-SCNC: 16.2 ML/DL
PCO2 BLDV: 79.1 MM HG (ref 42–50)
PCO2 BLDV: 90.6 MM HG (ref 42–50)
PCO2 BLDV: 95 MM HG (ref 42–50)
PEEP MAX SETTING VENT: 6 CM[H2O]
PH BLDV: 7.32 [PH] (ref 7.3–7.4)
PH BLDV: 7.33 [PH] (ref 7.3–7.4)
PH BLDV: 7.37 [PH] (ref 7.3–7.4)
PHOSPHATE SERPL-MCNC: 4.7 MG/DL (ref 2.3–4.1)
PLATELET # BLD AUTO: 168 THOUSANDS/UL (ref 149–390)
PMV BLD AUTO: 9.6 FL (ref 8.9–12.7)
PO2 BLDV: 131.5 MM HG (ref 35–45)
PO2 BLDV: 53.7 MM HG (ref 35–45)
PO2 BLDV: 74.9 MM HG (ref 35–45)
POTASSIUM SERPL-SCNC: 4.4 MMOL/L (ref 3.5–5.3)
PROCALCITONIN SERPL-MCNC: 0.42 NG/ML
PROT SERPL-MCNC: 5.9 G/DL (ref 6.4–8.4)
QRS AXIS: 83 DEGREES
QRSD INTERVAL: 120 MS
QT INTERVAL: 374 MS
QTC INTERVAL: 460 MS
RBC # BLD AUTO: 3.59 MILLION/UL (ref 3.88–5.62)
RSV RNA RESP QL NAA+PROBE: NEGATIVE
SARS-COV-2 RNA RESP QL NAA+PROBE: NEGATIVE
SODIUM SERPL-SCNC: 139 MMOL/L (ref 135–147)
T WAVE AXIS: -89 DEGREES
VENT BIPAP FIO2: 40 %
VENTRICULAR RATE: 91 BPM
WBC # BLD AUTO: 4.48 THOUSAND/UL (ref 4.31–10.16)

## 2025-03-15 PROCEDURE — 94664 DEMO&/EVAL PT USE INHALER: CPT

## 2025-03-15 PROCEDURE — 84100 ASSAY OF PHOSPHORUS: CPT | Performed by: INTERNAL MEDICINE

## 2025-03-15 PROCEDURE — 93010 ELECTROCARDIOGRAM REPORT: CPT | Performed by: INTERNAL MEDICINE

## 2025-03-15 PROCEDURE — 99233 SBSQ HOSP IP/OBS HIGH 50: CPT | Performed by: INTERNAL MEDICINE

## 2025-03-15 PROCEDURE — 94644 CONT INHLJ TX 1ST HOUR: CPT

## 2025-03-15 PROCEDURE — 94760 N-INVAS EAR/PLS OXIMETRY 1: CPT

## 2025-03-15 PROCEDURE — 80053 COMPREHEN METABOLIC PANEL: CPT | Performed by: INTERNAL MEDICINE

## 2025-03-15 PROCEDURE — 94640 AIRWAY INHALATION TREATMENT: CPT

## 2025-03-15 PROCEDURE — 94003 VENT MGMT INPAT SUBQ DAY: CPT

## 2025-03-15 PROCEDURE — 36415 COLL VENOUS BLD VENIPUNCTURE: CPT | Performed by: INTERNAL MEDICINE

## 2025-03-15 PROCEDURE — 84145 PROCALCITONIN (PCT): CPT | Performed by: INTERNAL MEDICINE

## 2025-03-15 PROCEDURE — 93005 ELECTROCARDIOGRAM TRACING: CPT

## 2025-03-15 PROCEDURE — 94660 CPAP INITIATION&MGMT: CPT

## 2025-03-15 PROCEDURE — 82805 BLOOD GASES W/O2 SATURATION: CPT | Performed by: STUDENT IN AN ORGANIZED HEALTH CARE EDUCATION/TRAINING PROGRAM

## 2025-03-15 PROCEDURE — 82805 BLOOD GASES W/O2 SATURATION: CPT

## 2025-03-15 PROCEDURE — 83735 ASSAY OF MAGNESIUM: CPT | Performed by: INTERNAL MEDICINE

## 2025-03-15 PROCEDURE — 85025 COMPLETE CBC W/AUTO DIFF WBC: CPT | Performed by: INTERNAL MEDICINE

## 2025-03-15 RX ORDER — CHLORHEXIDINE GLUCONATE ORAL RINSE 1.2 MG/ML
15 SOLUTION DENTAL EVERY 12 HOURS SCHEDULED
Status: DISCONTINUED | OUTPATIENT
Start: 2025-03-15 | End: 2025-04-01

## 2025-03-15 RX ADMIN — LEVALBUTEROL HYDROCHLORIDE 1.25 MG: 1.25 SOLUTION RESPIRATORY (INHALATION) at 14:05

## 2025-03-15 RX ADMIN — METHYLPREDNISOLONE SODIUM SUCCINATE 40 MG: 40 INJECTION, POWDER, FOR SOLUTION INTRAMUSCULAR; INTRAVENOUS at 08:46

## 2025-03-15 RX ADMIN — CHLORHEXIDINE GLUCONATE 15 ML: 1.2 SOLUTION ORAL at 20:03

## 2025-03-15 RX ADMIN — IPRATROPIUM BROMIDE 0.5 MG: 0.5 SOLUTION RESPIRATORY (INHALATION) at 14:05

## 2025-03-15 RX ADMIN — IPRATROPIUM BROMIDE 0.5 MG: 0.5 SOLUTION RESPIRATORY (INHALATION) at 20:16

## 2025-03-15 RX ADMIN — FORMOTEROL FUMARATE DIHYDRATE 20 MCG: 20 SOLUTION RESPIRATORY (INHALATION) at 09:07

## 2025-03-15 RX ADMIN — LEVALBUTEROL HYDROCHLORIDE 1.25 MG: 1.25 SOLUTION RESPIRATORY (INHALATION) at 09:06

## 2025-03-15 RX ADMIN — CEFTRIAXONE SODIUM 1000 MG: 10 INJECTION, POWDER, FOR SOLUTION INTRAVENOUS at 08:43

## 2025-03-15 RX ADMIN — IPRATROPIUM BROMIDE 0.5 MG: 0.5 SOLUTION RESPIRATORY (INHALATION) at 09:06

## 2025-03-15 RX ADMIN — FUROSEMIDE 40 MG: 10 INJECTION, SOLUTION INTRAVENOUS at 08:49

## 2025-03-15 RX ADMIN — METHYLPREDNISOLONE SODIUM SUCCINATE 40 MG: 40 INJECTION, POWDER, FOR SOLUTION INTRAMUSCULAR; INTRAVENOUS at 20:03

## 2025-03-15 RX ADMIN — LEVALBUTEROL HYDROCHLORIDE 1.25 MG: 1.25 SOLUTION RESPIRATORY (INHALATION) at 20:16

## 2025-03-15 RX ADMIN — ALBUTEROL SULFATE 10 MG: 2.5 SOLUTION RESPIRATORY (INHALATION) at 16:48

## 2025-03-15 RX ADMIN — FORMOTEROL FUMARATE DIHYDRATE 20 MCG: 20 SOLUTION RESPIRATORY (INHALATION) at 20:17

## 2025-03-15 RX ADMIN — FUROSEMIDE 40 MG: 10 INJECTION, SOLUTION INTRAVENOUS at 18:32

## 2025-03-15 RX ADMIN — BUDESONIDE 0.5 MG: 0.5 INHALANT RESPIRATORY (INHALATION) at 09:06

## 2025-03-15 RX ADMIN — ENOXAPARIN SODIUM 40 MG: 40 INJECTION SUBCUTANEOUS at 08:52

## 2025-03-15 RX ADMIN — BUDESONIDE 0.5 MG: 0.5 INHALANT RESPIRATORY (INHALATION) at 20:16

## 2025-03-15 NOTE — CONSULTS
Advanced Heart Failure / Pulmonary Hypertension Service Progress Note    Natalio Hartmann Jr. 69 y.o. male   MRN: [id number]  Unit/Bed#: QCE; Encounter: [id number]    Assessment:  Principal Problem:    Acute on chronic respiratory failure with hypoxia and hypercapnia (HCC)  Active Problems:    Obstructive sleep apnea    Goals of care, counseling/discussion    Acute on chronic systolic congestive heart failure (HCC)    SIRS (systemic inflammatory response syndrome)    COPD exacerbation (HCC)    Hyperlipidemia    Malnutrition (HCC)    Metabolic encephalopathy    Severe protein-calorie malnutrition (HCC)      Natalio Hartmann Jr. is a 69 y.o. male with a PMH of severe COPD, chronic hypoxic and hypercapnic respiratory failure on 4 L supplemental oxygen, cachexia, nonischemic cardiomyopathy, HFrEF/Stage C/D who presents to the ER with worsening shortness of breath,  increasingly lethargic feeling, and fatigue.  On day of admit, he was visibly short of breath slumped in the chair noted by his wife and brought to the ER.      In the ED he is noted to be hypoxic and hypercapnic requiring BiPAP. BNP on admit>1000.  CXR shows pulmonary congestion. IV Lasix started in the ER.  Unable to wean off Bipap--admitted to MICU.      Pt well known to heart failure team.  Saw Bren last on 2/21/25 and recommended Palliative consult, but patient declined.    Objective:   Intake/ Output: 80/1275 (-1195)  Weight: Admit 97 lbs--> today 102 bed  Telemetry:  SR HR 60 with PVC, PAC's    Today's Plan:  IV Lasix 40 BID-- Not floridly volume up  on exam but difficult assessment with bipap, restlessness.  Will wait for ECHO read to help with volume status.    NO GDMT on board--limited by hypotension  ECHO done, final read pending  Bipap, IV Steroids, antibx--failed Bipap wean this morning.  Per Pulmonology note--pt agreeable to intubation if needed.   Discussed Palliative consult with wife--would like to follow pt's wishes to continue treatment,  but understands poor prognosis.     Plan:  Acute on Chronic Respiratory Failure with hypoxia and hypercapnia (HCC)  -Required BiPAP in the ER--CO2 on admit 140--> now 79  -Multifactorial with COPD and CHF exacerbation contributing  -IV steroids, IV Lasix    Acute on Chronic HFrEF; LVEF 20-25%; NYHA III; ACC/AHA Stage C/D (HCC)  -Etiology: Nonischemic cardiomyopathy.  Possible dyssynchrony from chronic LBBB.  Despite QRS only being 120 ms, echo shows significant dyssynchrony.     Weights: last OV 2/21/25: 97 lbs. --> on admit 3/14 : 97 lbs    Pt extremely cachectic on exam.  Remains on Bipap    Neurohormonal Blockade: GDMT limited by hypotension  --Beta Blocker: no  --ARNi / ACEi / ARB: losartan 12.5 mg QD, off   --Aldosterone Antagonist: no   --SGLT2 Inhibitor: no  --Home Diuretic: torsemide 40 mg QD alternating with 20 mg QD     Sudden Cardiac Death Risk Reduction:  --ICD: LVEF 20%.      Electrical Resynchronization:  --Candidacy for BiV device: QRSd 120ms, chronic LBBB with dyssynchrony on echocardiogram.     Advanced Therapies (if appropriate): Not appropriate at this time, particularly considering advanced lung disease.     TTE 9/6/24: LVEF 25%  TTE 5/1/23: LVEF 20%. Severe global hypokinesis with regional variation. RV cavity size normal, systolic function normal. Trace MR, TR.   TTE 2/21/23: LVEF 30%. LVIDd 6.6cm. severe global hypokinesis. Grade I DD. RV cavity size and systolic function normal. Mild TR.   TTE 2/12/2023: LVEF 20-25%.  Severe global hypokinesis with regional variation.  Grade 1 DD.  Abnormal septal motion consistent with LBBB.  RV cavity mildly dilated, normal systolic function.  Mild MR, mild TR.  RVSP 38.  Dilated IVC.  TTE 8/26/2022: LVEF 40%.  RV cavity mildly dilated.  Systolic function normal.  Mild MR, TR.  Normal IVC.        COPD (end stage) (HCC)  Continues on Bipap, IV steroids with improving CO2 levels but failed weaning this morning.    Former smoker; 105 pack years, quit in  .  Severe disease, follows closely with pulm.  On home oxygen.  Multiple hospitalizations with acute COPD exacerbations   Pulmonology following    Metabolic Encephalopathy (HCC)  - on admit likely d/t Hypercapnia, hypoxia  H/O COVID-19  Nonobstructive CAD  Has coronary calcium on CT  On aspirin and statin  Hyperlipidemia  Continue statin  KEVIN   On BiPAP nightly  Cachexia  -BMI 17.7  GOC.    -palliative care discussed. He is not interested      Vitals:   Blood pressure 127/78, pulse 102, temperature (!) 96.5 °F (35.8 °C), temperature source Axillary, resp. rate (!) 31, weight 44 kg (97 lb), SpO2 90%.    Body mass index is 17.74 kg/m².    I/O last 3 completed shifts:  In: 300 [IV Piggyback:300]  Out: -     Wt Readings from Last 10 Encounters:   25 44 kg (97 lb)   25 44.3 kg (97 lb 11.2 oz)   24 44.9 kg (99 lb)   24 45.2 kg (99 lb 9.6 oz)   24 44.7 kg (98 lb 9.6 oz)   04/15/24 44.4 kg (97 lb 12.8 oz)   24 45 kg (99 lb 4.8 oz)   24 45.4 kg (100 lb)   24 46.7 kg (103 lb)   01/10/24 43.5 kg (96 lb)     Vitals:    03/15/25 0830 03/15/25 0838 03/15/25 0846 03/15/25 0900   BP:    127/78   BP Location:    Left arm   Pulse: (!) 108   102   Resp: (!) 34   (!) 31   Temp:   (!) 96.5 °F (35.8 °C)    TempSrc:   Axillary    SpO2: (!) 78% 91%  90%   Weight:           Physical Exam  Constitutional:       General: He is in acute distress.      Appearance: He is ill-appearing.   Neck:      Comments: Difficult JVD assessment with restlessness, Bipap mask  Cardiovascular:      Rate and Rhythm: Normal rate and regular rhythm. Extrasystoles are present.  Pulmonary:      Effort: Respiratory distress present.      Breath sounds: Normal air entry. Decreased breath sounds present.   Musculoskeletal:      Right lower le+ Edema present.      Left lower le+ Edema present.   Skin:     General: Skin is warm and dry.   Neurological:      Comments: Restless, drowsy on BIpap           Current  Facility-Administered Medications:     acetaminophen (TYLENOL) tablet 650 mg, 650 mg, Oral, Q6H PRN, Sadie Sung MD    aluminum-magnesium hydroxide-simethicone (MAALOX) oral suspension 30 mL, 30 mL, Oral, Q6H PRN, Sadie Sung MD    azithromycin (ZITHROMAX) tablet 250 mg, 250 mg, Oral, Q24H, Sadie Sung MD    benzonatate (TESSALON PERLES) capsule 200 mg, 200 mg, Oral, TID, Sadie Sung MD    budesonide (PULMICORT) inhalation solution 0.5 mg, 0.5 mg, Nebulization, Q12H, Sadie Sung MD, 0.5 mg at 03/15/25 0906    ceftriaxone (ROCEPHIN) 1 g/50 mL in dextrose IVPB, 1,000 mg, Intravenous, Q24H, Yasmin Bennett MD, Last Rate: 100 mL/hr at 03/15/25 0843, 1,000 mg at 03/15/25 0843    chlorhexidine (PERIDEX) 0.12 % oral rinse 15 mL, 15 mL, Mouth/Throat, Q12H [facility], Nguyễn Liu DO    dextromethorphan-guaiFENesin (ROBITUSSIN DM) oral syrup 10 mL, 10 mL, Oral, Q4H PRN, Sadie Sung MD    docusate sodium (COLACE) capsule 100 mg, 100 mg, Oral, BID, Sadie Sung MD    enoxaparin (LOVENOX) subcutaneous injection 40 mg, 40 mg, Subcutaneous, Daily, Sadie Sung MD, 40 mg at 03/15/25 0852    formoterol (PERFOROMIST) nebulizer solution 20 mcg, 20 mcg, Nebulization, Q12H, Yasmin Bennett MD, 20 mcg at 03/15/25 0907    furosemide (LASIX) injection 40 mg, 40 mg, Intravenous, BID, Sadie Sung MD, 40 mg at 03/15/25 0849    ipratropium (ATROVENT) 0.02 % inhalation solution 0.5 mg, 0.5 mg, Nebulization, TID, Sadie Sung MD, 0.5 mg at 03/15/25 0906    levalbuterol (XOPENEX) inhalation solution 1.25 mg, 1.25 mg, Nebulization, TID, Sadie Sung MD, 1.25 mg at 03/15/25 0906    methylPREDNISolone sodium succinate (Solu-MEDROL) injection 40 mg, 40 mg, Intravenous, Q12H GABE, Yasmin Bennett MD, 40 mg at 03/15/25 0846    ondansetron (ZOFRAN) injection 4 mg, 4 mg, Intravenous, Q6H PRN, Sadie Sung MD    polyethylene  glycol (MIRALAX) packet 17 g, 17 g, Oral, Daily, Sadie Sung MD    Current Outpatient Medications:     albuterol (PROVENTIL HFA,VENTOLIN HFA) 90 mcg/act inhaler, Inhale 2 puffs every 6 (six) hours as needed for wheezing or shortness of breath, Disp: 18 g, Rfl: 6    budesonide (PULMICORT) 0.5 mg/2 mL nebulizer solution, TAKE 2 ML (0.5 MG TOTAL) BY NEBULIZATION TWICE A DAY RINSE MOUTH AFTER USE, Disp: 1080 mL, Rfl: 1    CALCIUM PO, Take by mouth, Disp: , Rfl:     ferrous sulfate 324 (65 Fe) mg, Take 1 tablet (324 mg total) by mouth daily before breakfast (Patient not taking: Reported on 2/21/2025), Disp: 90 tablet, Rfl: 1    folic acid (FOLVITE) 400 mcg tablet, Take 1 tablet (400 mcg total) by mouth daily (Patient not taking: Reported on 2/21/2025), Disp: 90 tablet, Rfl: 1    ipratropium-albuterol (DUO-NEB) 0.5-2.5 mg/3 mL nebulizer solution, Take 3 mL by nebulization 4 (four) times a day, Disp: 360 mL, Rfl: 5    Nebulizers (InnoSpire Essence Nebulizer) MISC, Use as directed (Patient not taking: Reported on 2/21/2025), Disp: , Rfl:     predniSONE 10 mg tablet, Take 1 tablet (10 mg total) by mouth daily, Disp: 30 tablet, Rfl: 6    torsemide (DEMADEX) 20 mg tablet, TAKE 1 TABLET ONE DAY, ALTERNATING WITH 2 TABLETS THE NEXT, Disp: 180 tablet, Rfl: 1    Labs & Results:      Results from last 7 days   Lab Units 03/15/25  0414 03/14/25  2348 03/14/25  1039   WBC Thousand/uL 4.48  --  7.83   HEMOGLOBIN g/dL 10.9*  --  11.9*   HEMATOCRIT % 37.6  --  40.6   PLATELETS Thousands/uL 168 174 207         Results from last 7 days   Lab Units 03/15/25  0414 03/14/25  1039   POTASSIUM mmol/L 4.4 4.6   CHLORIDE mmol/L 91* 84*   CO2 mmol/L 44* >45*   BUN mg/dL 36* 38*   CREATININE mg/dL 0.75 1.15   CALCIUM mg/dL 8.2* 8.7   ALK PHOS U/L 52 60   ALT U/L 88* 57*   AST U/L 118* 88*     Results from last 7 days   Lab Units 03/14/25  1139   INR  0.84*         Thank you for the opportunity to participate in the care of this  patient.    DELIA Betancourt  Advanced Heart Failure  [facility]

## 2025-03-15 NOTE — ASSESSMENT & PLAN NOTE
Continue nebulized bronchodilators budesonide, formoterol  Pulmonary toileting  Continue with Solu-Medrol 40 every 12  Azithromycin and ceftriaxone x 5D course  Unable to wean patient off BiPAP.  Patient will be transferred to the MICU for further monitoring  Threshold for intubation

## 2025-03-15 NOTE — RESPIRATORY THERAPY NOTE
Resp care   03/15/25 0913   Inhalation Therapy Tx   $ Inhalation Therapy Performed Yes   $ Pulse Oximetry Spot Check Charge Completed   Pre-Treatment Pulse 108   Pre-Treatment Respirations 25   Duration 30   Breath Sounds Pre-Treatment Bilateral Expiratory wheeze;Coarse   Breath Sounds Post-Treatment Bilateral Expiratory wheezes;Inspiratory wheezes   Post-Treatment Pulse 100   Post-Treatment Respirations 26   Delivery Source UDN;Air   Position Semi Shaffer's   Treatment Tolerance Tolerated well   Resp Comments pt found on bipap, 20/8 rr8, 30% increased wob/ accessory muscle use and 90% fio2, pt placed on documented bipap settings, spo2 is 99%, udn tx given inline, bs are exp wheeze/coarse, will cont to monitor per resp protocol.

## 2025-03-15 NOTE — PROGRESS NOTES
Progress Note - Pulmonology   Name: Natalio Hartmann Jr. 69 y.o. male I MRN: [id number]  Unit/Bed#: QCE I Date of Admission: 3/14/2025   Date of Service: 3/15/2025 I Hospital Day: 1     Assessment & Plan  Acute on chronic respiratory failure with hypoxia and hypercapnia (HCC)  69-year-old male with medical history of very severe COPD FEV1 22%, chronic hypoxic and hypercapnic respiratory failure on 4 L supplemental O2 + Trilogy NIV at night, chronic prednisone 10 mg, COPD cachexia nonischemic cardiomyopathy EF 25%, KEVIN on BiPAP who presents with worsening shortness of breath.   In ED patient found to be hypotensive, lethargic, hypercapnic 7.19/141.7 for which BiPAP placed 20/6 (baseline 7.3/86.8).  Meeting sepsis criteria given ceftriaxone in ED.Patient status post albuterol Atrovent nebs in ED Solu-Medrol 125.    Labs: Pro-Jorge Alberto negative BNP 1079  RSV COVID flu negative.  Pro-Jorge Alberto mildly elevated at 0.42  X-ray reviewed cardiomegaly, pulmonary congestion noted.   On examination patient down to 5 L via nasal cannula, crackles noted to bases JVD present, hepatomegaly, 2+ pedal edema    Presentation likely in setting of CHF exacerbation, with component of COPD.   Continue with diuresis per primary team for goal net negative, as blood pressure tolerates.  Continue on ceftriaxone azithromycin.  Hypercapnia improving 7.3/79.1/131.5/45.1 this morning however unable to wean patient off BiPAP.   Cardiology consulted appreciate recommendations  Daily weights strict RG monitoring,  Continue with management of COPD as below.  Recommend palliative care consult  Will transfer to MICU for closer monitoring  COPD exacerbation (HCC)  Continue nebulized bronchodilators budesonide, formoterol  Pulmonary toileting  Continue with Solu-Medrol 40 every 12  Azithromycin and ceftriaxone x 5D course  Unable to wean patient off BiPAP.  Patient will be transferred to the MICU for further monitoring  Threshold for intubation  Obstructive sleep  apnea  On Trilogy NIV at night  C/W BIPAP as above  Acute on chronic systolic congestive heart failure (HCC)  Wt Readings from Last 3 Encounters:   03/14/25 44 kg (97 lb)   02/21/25 44.3 kg (97 lb 11.2 oz)   09/06/24 44.9 kg (99 lb)   Acute decompensated CHF    BNP elevated 1079  2D echo 9/2024 EF 25% systolic function severely reduced severe global hypokinesis LA moderately dilated mild TR, no aortic stenosis, trace MR, mild TR normal RVSP, no pericardial effusion  Continue with Lasix 40 twice daily  Follow-up repeat echo  Hyperlipidemia    Malnutrition (HCC)  Malnutrition Findings:                                 BMI Findings:           Body mass index is 17.74 kg/m².   Consult nutrition  Metabolic encephalopathy  Improving likely in setting of acute on chronic hypercapnic respiratory failure  Patient Aox3,  lethargic but conversant and answering questions appropriately  Severe protein-calorie malnutrition (HCC)  Malnutrition Findings:                                 BMI Findings:         Recommend Nutrition Consult  Body mass index is 17.74 kg/m².   Goals of care, counseling/discussion  Palliative consult  SIRS (systemic inflammatory response syndrome)  C/W Ceftriaxone, Azithromycin    Recommendations  - Continue with diuresis per primary team for goal net negative, as blood pressure tolerates.  - Continue on ceftriaxone azithromycin.  - Hypercapnia improving 7.3/79.1/131.5/45.1 this morning however unable to wean patient off BiPAP.   - Cardiology consulted appreciate recommendations  - Daily weights strict RG monitoring,  - Continue with management of COPD as below.  - Recommend palliative care consult  - Will transfer to MICU for closer monitoring    24 Hour Events : He has remained on BiPAP for past 24 hours with improving hypercapnia.     Subjective : Patient seen and examined at bedside.  Patient has remained on BiPAP for past 24 hours.  Is asking if he can be removed off BiPAP.  Says that breathing is not  so good today.  Feels thirsty.  Asked if patient would want to be intubated if needed he says yes.     Objective :  Temp:  [101 °F (38.3 °C)] 101 °F (38.3 °C)  HR:  [] 86  BP: ()/(55-75) 103/67  Resp:  [14-32] 25  SpO2:  [96 %-100 %] 100 %  O2 Device: BiPAP  Nasal Cannula O2 Flow Rate (L/min):  [5 L/min] 5 L/min    Physical Exam  Constitutional:       General: He is in acute distress.      Appearance: He is ill-appearing and toxic-appearing.   HENT:      Head: Normocephalic and atraumatic.   Cardiovascular:      Rate and Rhythm: Tachycardia present.      Heart sounds: No murmur heard.     No gallop.   Pulmonary:      Breath sounds: No stridor. Wheezing and rales present. No rhonchi.   Abdominal:      General: Abdomen is flat. There is no distension.   Musculoskeletal:         General: Swelling present.      Right lower leg: Edema present.      Left lower leg: Edema present.   Skin:     General: Skin is warm and dry.      Capillary Refill: Capillary refill takes less than 2 seconds.   Neurological:      General: No focal deficit present.   Psychiatric:         Mood and Affect: Mood normal.         Lab Results: I have reviewed the following results:   .     03/14/25  1039 03/14/25  1139 03/14/25  2348 03/15/25  0414   WBC 7.83  --   --  4.48   HGB 11.9*  --   --  10.9*   HCT 40.6  --   --  37.6     --  174 168   SODIUM 136  --   --  139   K 4.6  --   --  4.4   CL 84*  --   --  91*   CO2 >45*  --   --  44*   BUN 38*  --   --  36*   CREATININE 1.15  --   --  0.75   GLUC 132  --   --  101   MG  --   --   --  2.1   PHOS  --   --   --  4.7*   AST 88*  --   --  118*   ALT 57*  --   --  88*   ALB 4.2  --   --  3.6   TBILI 0.71  --   --  0.41   ALKPHOS 60  --   --  52   PTT  --  22*  --   --    INR  --  0.84*  --   --    BNP  --   --  1,079*  --    LACTICACID 1.4  --   --   --      ABG: No new results in last 24 hours.    Imaging Results Review: I personally reviewed the following image studies/reports in  PACS and discussed pertinent findings with Radiology: chest xray. My interpretation of the radiology images/reports is: CXR shows pulmonary vascular congestion, cardiology.  Other Study Results Review: No additional pertinent studies reviewed.  PFT Results Reviewed: reviewed and interpreted

## 2025-03-15 NOTE — ASSESSMENT & PLAN NOTE
69-year-old male with medical history of very severe COPD FEV1 22%, chronic hypoxic and hypercapnic respiratory failure on 4 L supplemental O2 + Trilogy NIV at night, chronic prednisone 10 mg, COPD cachexia nonischemic cardiomyopathy EF 25%, KEVIN on BiPAP who presents with worsening shortness of breath.   In ED patient found to be hypotensive, lethargic, hypercapnic 7.19/141.7 for which BiPAP placed 20/6 (baseline 7.3/86.8).  Meeting sepsis criteria given ceftriaxone in ED.Patient status post albuterol Atrovent nebs in ED Solu-Medrol 125.    Labs: Pro-Jorge Alberto negative BNP 1079  RSV COVID flu negative.  Pro-Jorge Alberto mildly elevated at 0.42  X-ray reviewed cardiomegaly, pulmonary congestion noted.   On examination patient down to 5 L via nasal cannula, crackles noted to bases JVD present, hepatomegaly, 2+ pedal edema    Presentation likely in setting of CHF exacerbation, with component of COPD.   Continue with diuresis per primary team for goal net negative, as blood pressure tolerates.  Continue on ceftriaxone azithromycin.  Hypercapnia improving 7.3/79.1/131.5/45.1 this morning however unable to wean patient off BiPAP.   Cardiology consulted appreciate recommendations  Daily weights strict RG monitoring,  Continue with management of COPD as below.  Recommend palliative care consult  Will transfer to MICU for closer monitoring

## 2025-03-15 NOTE — ASSESSMENT & PLAN NOTE
ADMISSION HISTORY AND PHYSICAL        Patient: Yvon Larios   YOB: 1940   MR Number: [id number]   Today's Date: 2023   Date of Admission: 2023   Attending Physician: Madalyn Palma MD     Primary Care Provider:  Rachel Phipps NP    Impression and Plan:     Hyperkalemia:  Lokelma 10g BID  Telemetry monitor  S/P IV Bumex 1mg  x1 dose, calcium Chloride, 50% dextrose and insulin as well as sodium bicarbonate 1 amp dose   Nephrology on Consult  Hold Aldactone    Acute  kidney injury on CKD :  IV fluid-normal saline at 100cc/hr. Monitor closely due to history of EF of 27%  Avoid nephrotoxic drugs    Hypotension with history of hypertension: Monitor closely unclear etiology. Hold  antihypertensives    UTI: IV Rocephin and follow-up urine culture    Type 2 diabetes:  Insulin sliding scale monitor blood sugar    Chronic systolic heart failure with EF 27%: Hold Entresto, metoprolol and aldactone due to borderline low blood pressure    Regarding the rest of the patient's chronic medical conditions, they are monitored during this admission and prior to admission medications are continued as indicated except as otherwise highlighted above. DVT Prophylaxis - Lovenox prophylactic dosing (adjusted per renal function)  Nutrition status - appropriate  Body mass index is 22.22 kg/mÂ². - appropriate weight BMI 18.5-24    Consult orders:  IP CONSULT TO NEPHROLOGY    Admission requirements and anticipated length of stay:  The patient is expected to require a two midnight stay in the hospital.  Admission is required to provide service and treatment only available in the hospital.  Admission is supported by the followin. The documented complex medical factors and comorbidities. 2.  The severity of signs and symptoms. 3.  The current and anticipated ongoing medical needs. 4.  The potential risk for an adverse event or outcome.     Chief Complaint:  Hyperkalemia    HPI:  Yvon Larios is a 82 Malnutrition Findings:                                 BMI Findings:         Recommend Nutrition Consult  Body mass index is 17.74 kg/m².    year old female with past medical history of type 2 diabetes, hypertension, rheumatoid arthritis, hyperlipidemia, cardiomyopathy with EF of 27% was referred to the emergency room today due to potassium level 7.2. Patient repeated potassium level was 6 point C in the ED. Patient denies shortness of breadth chest pain dizziness or loss of consciousness. Following ed evaluation, patient was initiated on IV Bumex 1mg  x1 dose, calcium Chloride, 50% dextrose and insulin as well as sodium bicarbonate 1 amp dose following discussion with Nephrology, Dr. Eligio Marie. Treatment received in ER:  ED Medication Orders (From admission, onward)    Ordered Start     Status Ordering Provider    04/13/23 1654 04/13/23 1655  cefTRIAXone (ROCEPHIN) syringe 1,000 mg  ONCE         Ordered Esta Randa    04/13/23 1614 04/13/23 1615  bumetanide (BUMEX) injection 1 mg  ONCE         Last MAR action: Given Esta Randa    04/13/23 1556 04/13/23 1557  calcium gluconate 3 g in dextrose 5 % 100 mL total volume IVPB  ONCE         Last MAR action: New Bag HILARY MANN    04/13/23 1556 04/13/23 1557  sodium bicarbonate 8.4 % injection 50 mEq  ONCE         Last MAR action: Given Esta Randa    04/13/23 1556 04/13/23 1557  insulin regular human (HumuLIN R) (diluted) 1 unit/mL injection 5 Units  ONCE         Last MAR action: Given Esta Randa    04/13/23 1556 04/13/23 1557  dextrose 10 % bolus 250 mL  ONCE         Last MAR action: Completed Esta Randa    04/13/23 1556 04/13/23 1554  sodium chloride (NORMAL SALINE) 0.9 % bolus 1,000 mL  ONCE        See Jared for full Linked Orders Report.     Last MAR action: New Bag Esta Randa          Past Medical History:   Diagnosis Date   â¢ Cataract     bilateral   â¢ Diabetes mellitus type 2, uncontrolled, without complications    â¢ Essential hypertension, benign    â¢ Hyperlipemia    â¢ Osteopenia    â¢ Rheumatoid arthritis(714.0)      Past Surgical History:   Procedure Laterality Date "  â¢ Dexa bone density axial skeleton  07/13/2011   â¢ Oral surgery procedure     â¢ Pap smear  06/20/2012     (Not in a hospital admission)    ALLERGIES:  No Known Allergies  Social History     Tobacco Use   â¢ Smoking status: Never   â¢ Smokeless tobacco: Never   Vaping Use   â¢ Vaping Use: never used   Substance Use Topics   â¢ Alcohol use: No   â¢ Drug use: No     Family History   Problem Relation Age of Onset   â¢ High blood pressure Mother    â¢ Heart disease Mother    â¢ High blood pressure Father    â¢ Diabetes Father    â¢ Heart disease Father    â¢ Heart disease Brother        Code Status: full  Prior    ROS: A 12 point review of systems was complete and negative except as noted here or above in the History of Present Illness    Objective   OBJECTIVE:  Vitals 24-Hour Range Most Recent Value   Temperature Temp  Min: 98.1 Â°F (36.7 Â°C)  Max: 98.1 Â°F (36.7 Â°C) 98.1 Â°F (36.7 Â°C)   Pulse Pulse  Min: 60  Max: 66 63   Respiratory Resp  Min: 13  Max: 26 (!) 26   Blood Pressure BP  Min: 79/51  Max: 98/47 94/51   Pulse Oximetry SpO2  Min: 96 %  Max: 98 %    O2 No data recorded      Vitals Most Recent Value First Value   Weight 49.9 kg (110 lb 0.2 oz) Weight: 49.9 kg (110 lb 0.2 oz)   Height 4' 11"" (149.9 cm) Height: 4' 11\"" (149.9 cm)     Physical Examination:    General:  well developed, well nourished and no apparent distress  HEENT: normocephalic, atraumatic and normal conjunctivae and lids  Neck:  trachea midline and normal thyroid  CV: Normal S1, S2, No murmur or gallop  Resp: clear to auscultation bilaterally  Abd: soft, nontender, nondistended and no hepatosplenomegaly  Ext: no clubbing, cyanosis, or  edema  Neuro: CN 2-12 grossly intact, normal sensation  and no focal deficits noted  Psych: Normal mood and affect    Laboratory Results:  Labs:  The Laboratory values listed below have been reviewed and pertinent findings discussed in the Assessment and Plan.  lab last 48 hrs;   Recent Results (from the past 48 hour(s))   Renal " Panel    Collection Time: 04/13/23 10:57 AM   Result Value Ref Range    Fasting Status      Sodium 128 (L) 135 - 145 mmol/L    Potassium 7.2 (HH) 3.4 - 5.1 mmol/L    Chloride 94 (L) 97 - 110 mmol/L    Carbon Dioxide 24 21 - 32 mmol/L    Anion Gap 17 7 - 19 mmol/L    Calcium 8.7 8.4 - 10.2 mg/dL    Phosphorus 8.3 (H) 2.4 - 4.7 mg/dL    Albumin 2.3 (L) 3.6 - 5.1 g/dL    Glucose 246 (H) 70 - 99 mg/dL    BUN 48 (H) 6 - 20 mg/dL    Creatinine 1.79 (H) 0.51 - 0.95 mg/dL    Glomerular Filtration Rate 28 (L) >=60    BUN/Cr 27 (H) 7 - 25   Hemoglobin and Hematocrit    Collection Time: 04/13/23 10:57 AM   Result Value Ref Range    HGB 7.9 (L) 12.0 - 15.5 g/dL    HCT 29.1 (L) 36.0 - 46.5 %   Protein/Creatinine Ratio, Urine    Collection Time: 04/13/23 10:57 AM   Result Value Ref Range    Protein, Urine 49 (H) <12 mg/dL    Creatinine, Urine 126.25 mg/dL    Protein/ Creatinine Ratio 388 (H) <=199 mgPR/gCR   Urinalysis With Microscopy & Culture If Indicated    Collection Time: 04/13/23 10:57 AM   Result Value Ref Range    COLOR, URINALYSIS Yellow     APPEARANCE, URINALYSIS Cloudy     GLUCOSE, URINALYSIS 500 (A) Negative mg/dL    BILIRUBIN, URINALYSIS Negative Negative    KETONES, URINALYSIS Negative Negative mg/dL    SPECIFIC GRAVITY, URINALYSIS 1.024 1.005 - 1.030    OCCULT BLOOD, URINALYSIS Negative Negative    PH, URINALYSIS 6.0 5.0 - 7.0    PROTEIN, URINALYSIS 30 (A) Negative mg/dL    UROBILINOGEN, URINALYSIS 0.2 0.2, 1.0 mg/dL    NITRITE, URINALYSIS Positive (A) Negative    LEUKOCYTE ESTERASE, URINALYSIS Large (A) Negative    SQUAMOUS EPITHELIAL, URINALYSIS 1 to 5 None Seen, 1 to 5 /hpf    ERYTHROCYTES, URINALYSIS 3 to 5 (A) None Seen, 1 to 2 /hpf    LEUKOCYTES, URINALYSIS 26 to 100 (A) None Seen, 1 to 5 /hpf    BACTERIA, URINALYSIS Large (A) None Seen /hpf    HYALINE CASTS, URINALYSIS 26 to 100 (A) None Seen, 1 to 5 /lpf    TRANSITIONAL EPITHELIALS 1 to 5 1 to 5, None Seen /hpf    MUCUS Present    NT proBNP    Collection Time: 04/13/23 10:57 AM   Result Value Ref Range    NT-proBNP 21,505 (H) <=450 pg/mL   Magnesium    Collection Time: 04/13/23 10:57 AM   Result Value Ref Range    Magnesium 2.5 (H) 1.7 - 2.4 mg/dL   ISTAT CG8, VENOUS     +POC ONLY    Collection Time: 04/13/23  3:13 PM   Result Value Ref Range    PH, VENOUS - POINT OF CARE 7.31 (L) 7.35 - 7.45 Units    PCO2, VENOUS - POINT OF CARE 45 38 - 51 mm Hg    PO2, VENOUS - POINT OF CARE 31 (L) 35 - 42 mm Hg    BASE EXCESS / DEFICIT, VENOUS - POINT OF CARE -4 (L) -2 - 2 mmol/L    HCO3, VENOUS - POINT OF CARE 22 22 - 28 mmol/L    TCO2 - POINT OF CARE 24 19 - 24 mmol/L    O2 SATURATION, VENOUS - POINT OF CARE 54 (L) 60 - 80 %    SODIUM - POINT OF CARE 128 (L) 135 - 145 mmol/L    POTASSIUM - POINT OF CARE 6.6 (HH) 3.4 - 5.1 mmol/L    CALCIUM, IONIZED - POINT OF CARE 1.18 1.15 - 1.29 mmol/L    GLUCOSE - POINT OF CARE 131 (H) 70 - 99 mg/dL    HEMATOCRIT - POINT OF CARE 31.0 (L) 36.0 - 46.5 %    HEMOGLOBIN - POINT OF CARE 10.5 (L) 12.0 - 15.5 g/dL   Basic Metabolic Panel    Collection Time: 04/13/23  3:20 PM   Result Value Ref Range    Fasting Status      Sodium 128 (L) 135 - 145 mmol/L    Potassium 6.7 (HH) 3.4 - 5.1 mmol/L    Chloride 95 (L) 97 - 110 mmol/L    Carbon Dioxide 21 21 - 32 mmol/L    Anion Gap 19 7 - 19 mmol/L    Glucose 129 (H) 70 - 99 mg/dL    BUN 52 (H) 6 - 20 mg/dL    Creatinine 1.75 (H) 0.51 - 0.95 mg/dL    Glomerular Filtration Rate 29 (L) >=60    BUN/Cr 30 (H) 7 - 25    Calcium 8.7 8.4 - 10.2 mg/dL   Magnesium    Collection Time: 04/13/23  3:20 PM   Result Value Ref Range    Magnesium 2.6 (H) 1.7 - 2.4 mg/dL   CBC with Automated Differential (performable only)    Collection Time: 04/13/23  3:20 PM   Result Value Ref Range    WBC 14.0 (H) 4.2 - 11.0 K/mcL    RBC 3.39 (L) 4.00 - 5.20 mil/mcL    HGB 8.6 (L) 12.0 - 15.5 g/dL    HCT 29.8 (L) 36.0 - 46.5 %    MCV 87.9 78.0 - 100.0 fl    MCH 25.4 (L) 26.0 - 34.0 pg    MCHC 28.9 (L) 32.0 - 36.5 g/dL    RDW-CV 19.7 (H) 11.0 - 15.0 %    RDW-SD 62.6 (H) 39.0 - 50.0 fL     (H) 140 - 450 K/mcL    NRBC 0 <=0 /100 WBC    Neutrophil, Percent 84 %    Lymphocytes, Percent 9 %    Mono, Percent 5 %    Eosinophils, Percent 0 %    Basophils, Percent 1 %    Immature Granulocytes 1 %    Absolute Neutrophils 11.8 (H) 1.8 - 7.7 K/mcL    Absolute Lymphocytes 1.2 1.0 - 4.0 K/mcL    Absolute Monocytes 0.7 0.3 - 0.9 K/mcL    Absolute Eosinophils  0.0 0.0 - 0.5 K/mcL    Absolute Basophils 0.1 0.0 - 0.3 K/mcL    Absolute Immature Granulocytes 0.1 0.0 - 0.2 K/mcL   Procalcitonin    Collection Time: 04/13/23  3:20 PM   Result Value Ref Range    Procalcitonin 0.25 (H) <=0.09 ng/mL   LACTIC ACID VENOUS WITH REFLEX - POINT OF CARE    Collection Time: 04/13/23  3:58 PM   Result Value Ref Range    LACTIC ACID, VENOUS - POINT OF CARE 1.3 <2.0 mmol/L   Urinalysis & Reflex Microscopy With Culture If Indicated    Collection Time: 04/13/23  4:08 PM   Result Value Ref Range    COLOR, URINALYSIS Yellow     APPEARANCE, URINALYSIS Cloudy     GLUCOSE, URINALYSIS >1000 (A) Negative mg/dL    BILIRUBIN, URINALYSIS Negative Negative    KETONES, URINALYSIS Negative Negative mg/dL    SPECIFIC GRAVITY, URINALYSIS 1.018 1.005 - 1.030    OCCULT BLOOD, URINALYSIS Trace (A) Negative    PH, URINALYSIS 5.0 5.0 - 7.0    PROTEIN, URINALYSIS Trace (A) Negative mg/dL    UROBILINOGEN, URINALYSIS 0.2 0.2, 1.0 mg/dL    NITRITE, URINALYSIS Negative Negative    LEUKOCYTE ESTERASE, URINALYSIS Large (A) Negative    SQUAMOUS EPITHELIAL, URINALYSIS 11 to 25 (A) None Seen, 1 to 5 /hpf    ERYTHROCYTES, URINALYSIS 6 to 10 (A) None Seen, 1 to 2 /hpf    LEUKOCYTES, URINALYSIS 26 to 100 (A) None Seen, 1 to 5 /hpf    BACTERIA, URINALYSIS Moderate (A) None Seen /hpf    HYALINE CASTS, URINALYSIS 11 to 25 (A) None Seen, 1 to 5 /lpf    MUCUS Present    Renal Panel    Collection Time: 04/13/23  4:42 PM   Result Value Ref Range    Fasting Status      Sodium 130 (L) 135 - 145 mmol/L    Potassium 6.3 (HH) 3.4 - 5.1 mmol/L    Chloride 97 97 - 110 mmol/L    Carbon Dioxide 23 21 - 32 mmol/L    Anion Gap 16 7 - 19 mmol/L    Calcium 8.2 (L) 8.4 - 10.2 mg/dL    Phosphorus 7.6 (H) 2.4 - 4.7 mg/dL    Albumin 2.3 (L) 3.6 - 5.1 g/dL    Glucose 55 (L) 70 - 99 mg/dL    BUN 53 (H) 6 - 20 mg/dL    Creatinine 1.76 (H) 0.51 - 0.95 mg/dL    Glomerular Filtration Rate 29 (L) >=60    BUN/Cr 30 (H) 7 - 25     Radiology: Imaging studies have been reviewed and pertinent findings discussed in the Assessment and Plan. Results for orders placed or performed during the hospital encounter of 04/13/23 (from the past 48 hour(s))   XR CHEST AP OR PA - PORTABLE    Impression    IMPRESSION: Decreasing right effusion with minimal remaining right basilar  lung infiltrate or atelectasis    Continued cardiomegaly. Microbiology:  Microbiology Results     None        EKG:  No results found for this or any previous visit. Pending labs and procedures:  Unresulted Labs (From admission, onward)     Start     Ordered    04/13/23 1655  Blood Culture  EVERY 15 MIN,   STAT       04/13/23 1654    04/13/23 1626  Urine, Bacterial Culture  Once Routine,   STAT         04/13/23 1625    04/13/23 1616  Renal Panel  EVERY 4 HOURS,   TD       04/13/23 1615              Unresulted Procedure (From admission, onward)    None             Goals of care were discussed with the patient and/or family which included but not limited to: 1) Anticipated treatment course during the current hospitalization 2) Recovery from current event and 3) Discharge planning and transitions of care responsibilities and expected outcomes.     Madalyn Palma MD  Hospitalist  4/13/2023 5:34 PM

## 2025-03-15 NOTE — PLAN OF CARE
Problem: Potential for Falls  Goal: Patient will remain free of falls  Description: INTERVENTIONS:  - Educate patient/family on patient safety including physical limitations  - Instruct patient to call for assistance with activity   - Consult OT/PT to assist with strengthening/mobility   - Keep Call bell within reach  - Keep bed low and locked with side rails adjusted as appropriate  - Keep care items and personal belongings within reach  - Initiate and maintain comfort rounds  - Make Fall Risk Sign visible to staff  - Apply yellow socks and bracelet for high fall risk patients  - Consider moving patient to room near nurses station  Outcome: Not Progressing     Problem: PAIN - ADULT  Goal: Verbalizes/displays adequate comfort level or baseline comfort level  Description: Interventions:  - Encourage patient to monitor pain and request assistance  - Assess pain using appropriate pain scale  - Administer analgesics based on type and severity of pain and evaluate response  - Implement non-pharmacological measures as appropriate and evaluate response  - Consider cultural and social influences on pain and pain management  - Notify physician/advanced practitioner if interventions unsuccessful or patient reports new pain  Outcome: Not Progressing     Problem: INFECTION - ADULT  Goal: Absence or prevention of progression during hospitalization  Description: INTERVENTIONS:  - Assess and monitor for signs and symptoms of infection  - Monitor lab/diagnostic results  - Monitor all insertion sites, i.e. indwelling lines, tubes, and drains  - Monitor endotracheal if appropriate and nasal secretions for changes in amount and color  - [place] appropriate cooling/warming therapies per order  - Administer medications as ordered  - Instruct and encourage patient and family to use good hand hygiene technique  - Identify and instruct in appropriate isolation precautions for identified infection/condition  Outcome: Not  Progressing  Goal: Absence of fever/infection during neutropenic period  Description: INTERVENTIONS:  - Monitor WBC    Outcome: Not Progressing     Problem: SAFETY ADULT  Goal: Patient will remain free of falls  Description: INTERVENTIONS:  - Educate patient/family on patient safety including physical limitations  - Instruct patient to call for assistance with activity   - Consult OT/PT to assist with strengthening/mobility   - Keep Call bell within reach  - Keep bed low and locked with side rails adjusted as appropriate  - Keep care items and personal belongings within reach  - Initiate and maintain comfort rounds  - Make Fall Risk Sign visible to staff  - Apply yellow socks and bracelet for high fall risk patients  - Consider moving patient to room near nurses station  Outcome: Not Progressing  Goal: Maintain or return to baseline ADL function  Description: INTERVENTIONS:  -  Assess patient's ability to carry out ADLs; assess patient's baseline for ADL function and identify physical deficits which impact ability to perform ADLs (bathing, care of mouth/teeth, toileting, grooming, dressing, etc.)  - Assess/evaluate cause of self-care deficits   - Assess range of motion  - Assess patient's mobility; develop plan if impaired  - Assess patient's need for assistive devices and provide as appropriate  - Encourage maximum independence but intervene and supervise when necessary  - Involve family in performance of ADLs  - Assess for home care needs following discharge   - Consider OT consult to assist with ADL evaluation and planning for discharge  - Provide patient education as appropriate  Outcome: Not Progressing  Goal: Maintains/Returns to pre admission functional level  Description: INTERVENTIONS:  - Perform AM-PAC 6 Click Basic Mobility/ Daily Activity assessment daily.  - Set and communicate daily mobility goal to care team and patient/family/caregiver.   - Collaborate with rehabilitation services on mobility goals  if consulted  - Out of bed for toileting  - Record patient progress and toleration of activity level   Outcome: Not Progressing     Problem: DISCHARGE PLANNING  Goal: Discharge to home or other facility with appropriate resources  Description: INTERVENTIONS:  - Identify barriers to discharge w/patient and caregiver  - Arrange for needed discharge resources and transportation as appropriate  - Identify discharge learning needs (meds, wound care, etc.)  - Arrange for interpretive services to assist at discharge as needed  - Refer to Case Management Department for coordinating discharge planning if the patient needs post-hospital services based on physician/advanced practitioner order or complex needs related to functional status, cognitive ability, or social support system  Outcome: Not Progressing     Problem: Knowledge Deficit  Goal: Patient/family/caregiver demonstrates understanding of disease process, treatment plan, medications, and discharge instructions  Description: Complete learning assessment and assess knowledge base.  Interventions:  - Provide teaching at level of understanding  - Provide teaching via preferred learning methods  Outcome: Not Progressing     Problem: Nutrition/Hydration-ADULT  Goal: Nutrient/Hydration intake appropriate for improving, restoring or maintaining nutritional needs  Description: Monitor and assess patient's nutrition/hydration status for malnutrition. Collaborate with interdisciplinary team and initiate plan and interventions as ordered.  Monitor patient's weight and dietary intake as ordered or per policy. Utilize nutrition screening tool and intervene as necessary. Determine patient's food preferences and provide high-protein, high-caloric foods as appropriate.     INTERVENTIONS:  - Monitor oral intake, urinary output, labs, and treatment plans  - Assess nutrition and hydration status and recommend course of action  - Evaluate amount of meals eaten  - Assist patient with  eating if necessary   - Allow adequate time for meals  - Recommend/ encourage appropriate diets, oral nutritional supplements, and vitamin/mineral supplements  - Order, calculate, and assess calorie counts as needed  - Recommend, monitor, and adjust tube feedings and TPN/PPN based on assessed needs  - Assess need for intravenous fluids  - Provide specific nutrition/hydration education as appropriate  - Include patient/family/caregiver in decisions related to nutrition  Outcome: Not Progressing

## 2025-03-15 NOTE — ASSESSMENT & PLAN NOTE
Wt Readings from Last 3 Encounters:   03/14/25 44 kg (97 lb)   02/21/25 44.3 kg (97 lb 11.2 oz)   09/06/24 44.9 kg (99 lb)   Acute decompensated CHF    BNP elevated 1079  2D echo 9/2024 EF 25% systolic function severely reduced severe global hypokinesis LA moderately dilated mild TR, no aortic stenosis, trace MR, mild TR normal RVSP, no pericardial effusion  Continue with Lasix 40 twice daily  Follow-up repeat echo

## 2025-03-15 NOTE — RESPIRATORY THERAPY NOTE
Resp care   03/15/25 1000   Respiratory Assessment   Resp Comments pt transported to micu without incident   Non-Invasive Information   O2 Interface Device Face mask   Non-Invasive Ventilation Mode BiPAP   $ Continous NIV Subsequent   Non-Invasive Settings   IPAP (cm) 20 cm   EPAP (cm) 8 cm   Rate (Set) 14   FiO2 (%) 40   Pressure Support (cm H2O) 12   Rise Time 3   Inspiratory Time (Set) 0.7   Non-Invasive Readings   Total Rate 39   Vt (mL) (Mech) 395   MV (Mech) 13.3   Peak Pressure (Obs) 32   Leak (lpm) 23   Skin Intervention Skin intact   Non-Invasive Alarms   Insp Pressure High (cm H20) 40   Insp Pressure Low (cm H20) 3   MV Low (L/min) 2   Vt High (mL) 1100   Vt Low (mL) 200   High Resp Rate (BPM) 40 BPM   Low Resp Rate (BPM) 2 BPM   Apnea Interval (sec) 20   Apnea Rate 14   Maintenance   Water bag changed No        PAST MEDICAL HISTORY:  Bipolar disorder     CKD (chronic kidney disease)     Depression     Diabetes     DM (diabetes mellitus)     DVT, lower extremity     Glaucoma     Glaucoma     History of TIAs     HTN (hypertension)     Hyperlipidemia     Hyperlipidemia     Hypertension     Intracranial bleed     NPH (normal pressure hydrocephalus)     Osteoarthritis     Pneumonia, aspiration     Small bowel obstruction     UTI (urinary tract infection)

## 2025-03-16 ENCOUNTER — APPOINTMENT (INPATIENT)
Dept: NON INVASIVE DIAGNOSTICS | Facility: HOSPITAL | Age: 70
DRG: 291 | End: 2025-03-16
Payer: COMMERCIAL

## 2025-03-16 LAB
ALBUMIN SERPL BCG-MCNC: 3.4 G/DL (ref 3.5–5)
ALP SERPL-CCNC: 51 U/L (ref 34–104)
ALT SERPL W P-5'-P-CCNC: 64 U/L (ref 7–52)
AST SERPL W P-5'-P-CCNC: 63 U/L (ref 13–39)
BASE EX.OXY STD BLDV CALC-SCNC: 96.4 % (ref 60–80)
BASE EXCESS BLDV CALC-SCNC: 17.3 MMOL/L
BASOPHILS # BLD AUTO: 0 THOUSANDS/ÂΜL (ref 0–0.1)
BASOPHILS NFR BLD AUTO: 0 % (ref 0–1)
BILIRUB SERPL-MCNC: 0.46 MG/DL (ref 0.2–1)
BODY TEMPERATURE: 97.9 DEGREES FEHRENHEIT
BUN SERPL-MCNC: 49 MG/DL (ref 5–25)
CALCIUM ALBUM COR SERPL-MCNC: 8.7 MG/DL (ref 8.3–10.1)
CALCIUM SERPL-MCNC: 8.2 MG/DL (ref 8.4–10.2)
CHLORIDE SERPL-SCNC: 90 MMOL/L (ref 96–108)
CO2 SERPL-SCNC: >45 MMOL/L (ref 21–32)
CREAT SERPL-MCNC: 0.85 MG/DL (ref 0.6–1.3)
EOSINOPHIL # BLD AUTO: 0 THOUSAND/ÂΜL (ref 0–0.61)
EOSINOPHIL NFR BLD AUTO: 0 % (ref 0–6)
ERYTHROCYTE [DISTWIDTH] IN BLOOD BY AUTOMATED COUNT: 13.5 % (ref 11.6–15.1)
FOLATE SERPL-MCNC: >22.3 NG/ML
GFR SERPL CREATININE-BSD FRML MDRD: 90 ML/MIN/1.73SQ M
GLUCOSE SERPL-MCNC: 113 MG/DL (ref 65–140)
HCO3 BLDV-SCNC: 45.3 MMOL/L (ref 24–30)
HCT VFR BLD AUTO: 34.8 % (ref 36.5–49.3)
HGB BLD-MCNC: 10.3 G/DL (ref 12–17)
IMM GRANULOCYTES # BLD AUTO: 0.01 THOUSAND/UL (ref 0–0.2)
IMM GRANULOCYTES NFR BLD AUTO: 0 % (ref 0–2)
IPAP: 16
LYMPHOCYTES # BLD AUTO: 0.21 THOUSANDS/ÂΜL (ref 0.6–4.47)
LYMPHOCYTES NFR BLD AUTO: 8 % (ref 14–44)
MAGNESIUM SERPL-MCNC: 2.2 MG/DL (ref 1.9–2.7)
MCH RBC QN AUTO: 30.7 PG (ref 26.8–34.3)
MCHC RBC AUTO-ENTMCNC: 29.6 G/DL (ref 31.4–37.4)
MCV RBC AUTO: 104 FL (ref 82–98)
MONOCYTES # BLD AUTO: 0.21 THOUSAND/ÂΜL (ref 0.17–1.22)
MONOCYTES NFR BLD AUTO: 8 % (ref 4–12)
NEUTROPHILS # BLD AUTO: 2.38 THOUSANDS/ÂΜL (ref 1.85–7.62)
NEUTS SEG NFR BLD AUTO: 84 % (ref 43–75)
NON VENT- BIPAP: ABNORMAL
NRBC BLD AUTO-RTO: 0 /100 WBCS
O2 CT BLDV-SCNC: 15 ML/DL
PCO2 BLD: 73.5 MM HG (ref 42–50)
PCO2 BLDV: 74.8 MM HG (ref 42–50)
PEEP MAX SETTING VENT: 6 CM[H2O]
PH BLD: 7.41 [PH] (ref 7.3–7.4)
PH BLDV: 7.4 [PH] (ref 7.3–7.4)
PHOSPHATE SERPL-MCNC: 3.7 MG/DL (ref 2.3–4.1)
PLATELET # BLD AUTO: 178 THOUSANDS/UL (ref 149–390)
PMV BLD AUTO: 10.1 FL (ref 8.9–12.7)
PO2 BLDV: 93.3 MM HG (ref 35–45)
PO2 VENOUS TEMP CORRECTED: 91 MM HG (ref 35–45)
POTASSIUM SERPL-SCNC: 3.9 MMOL/L (ref 3.5–5.3)
PROT SERPL-MCNC: 5.8 G/DL (ref 6.4–8.4)
RBC # BLD AUTO: 3.35 MILLION/UL (ref 3.88–5.62)
SODIUM SERPL-SCNC: 144 MMOL/L (ref 135–147)
TSH SERPL DL<=0.05 MIU/L-ACNC: 0.53 UIU/ML (ref 0.45–4.5)
VENT BIPAP FIO2: 40 %
VIT B12 SERPL-MCNC: 286 PG/ML (ref 180–914)
WBC # BLD AUTO: 2.81 THOUSAND/UL (ref 4.31–10.16)

## 2025-03-16 PROCEDURE — 94003 VENT MGMT INPAT SUBQ DAY: CPT

## 2025-03-16 PROCEDURE — 93306 TTE W/DOPPLER COMPLETE: CPT

## 2025-03-16 PROCEDURE — 84100 ASSAY OF PHOSPHORUS: CPT

## 2025-03-16 PROCEDURE — 82746 ASSAY OF FOLIC ACID SERUM: CPT

## 2025-03-16 PROCEDURE — 94664 DEMO&/EVAL PT USE INHALER: CPT

## 2025-03-16 PROCEDURE — 94640 AIRWAY INHALATION TREATMENT: CPT

## 2025-03-16 PROCEDURE — 93306 TTE W/DOPPLER COMPLETE: CPT | Performed by: INTERNAL MEDICINE

## 2025-03-16 PROCEDURE — 82805 BLOOD GASES W/O2 SATURATION: CPT

## 2025-03-16 PROCEDURE — 84443 ASSAY THYROID STIM HORMONE: CPT

## 2025-03-16 PROCEDURE — 99223 1ST HOSP IP/OBS HIGH 75: CPT

## 2025-03-16 PROCEDURE — 82607 VITAMIN B-12: CPT

## 2025-03-16 PROCEDURE — 80053 COMPREHEN METABOLIC PANEL: CPT

## 2025-03-16 PROCEDURE — 85025 COMPLETE CBC W/AUTO DIFF WBC: CPT

## 2025-03-16 PROCEDURE — 93005 ELECTROCARDIOGRAM TRACING: CPT

## 2025-03-16 PROCEDURE — 94760 N-INVAS EAR/PLS OXIMETRY 1: CPT

## 2025-03-16 PROCEDURE — 99291 CRITICAL CARE FIRST HOUR: CPT | Performed by: INTERNAL MEDICINE

## 2025-03-16 PROCEDURE — 83735 ASSAY OF MAGNESIUM: CPT

## 2025-03-16 RX ORDER — ACETAZOLAMIDE 500 MG/5ML
250 INJECTION, POWDER, LYOPHILIZED, FOR SOLUTION INTRAVENOUS ONCE
Status: COMPLETED | OUTPATIENT
Start: 2025-03-17 | End: 2025-03-17

## 2025-03-16 RX ORDER — ACETAZOLAMIDE 500 MG/5ML
250 INJECTION, POWDER, LYOPHILIZED, FOR SOLUTION INTRAVENOUS ONCE
Status: COMPLETED | OUTPATIENT
Start: 2025-03-16 | End: 2025-03-16

## 2025-03-16 RX ORDER — WATER 10 ML/10ML
INJECTION INTRAMUSCULAR; INTRAVENOUS; SUBCUTANEOUS
Status: COMPLETED
Start: 2025-03-16 | End: 2025-03-16

## 2025-03-16 RX ADMIN — CEFTRIAXONE SODIUM 1000 MG: 10 INJECTION, POWDER, FOR SOLUTION INTRAVENOUS at 09:01

## 2025-03-16 RX ADMIN — AZITHROMYCIN MONOHYDRATE 500 MG: 500 INJECTION, POWDER, LYOPHILIZED, FOR SOLUTION INTRAVENOUS at 18:15

## 2025-03-16 RX ADMIN — IPRATROPIUM BROMIDE 0.5 MG: 0.5 SOLUTION RESPIRATORY (INHALATION) at 19:54

## 2025-03-16 RX ADMIN — WATER 10 ML: 1 INJECTION INTRAMUSCULAR; INTRAVENOUS; SUBCUTANEOUS at 18:10

## 2025-03-16 RX ADMIN — LEVALBUTEROL HYDROCHLORIDE 1.25 MG: 1.25 SOLUTION RESPIRATORY (INHALATION) at 07:26

## 2025-03-16 RX ADMIN — PERFLUTREN 0.6 ML/MIN: 6.52 INJECTION, SUSPENSION INTRAVENOUS at 11:30

## 2025-03-16 RX ADMIN — ACETAZOLAMIDE 250 MG: 500 INJECTION, POWDER, LYOPHILIZED, FOR SOLUTION INTRAVENOUS at 18:10

## 2025-03-16 RX ADMIN — IPRATROPIUM BROMIDE 0.5 MG: 0.5 SOLUTION RESPIRATORY (INHALATION) at 07:26

## 2025-03-16 RX ADMIN — FUROSEMIDE 40 MG: 10 INJECTION, SOLUTION INTRAVENOUS at 08:55

## 2025-03-16 RX ADMIN — METHYLPREDNISOLONE SODIUM SUCCINATE 40 MG: 40 INJECTION, POWDER, FOR SOLUTION INTRAMUSCULAR; INTRAVENOUS at 08:54

## 2025-03-16 RX ADMIN — IPRATROPIUM BROMIDE 0.5 MG: 0.5 SOLUTION RESPIRATORY (INHALATION) at 13:48

## 2025-03-16 RX ADMIN — BUDESONIDE 0.5 MG: 0.5 INHALANT RESPIRATORY (INHALATION) at 19:54

## 2025-03-16 RX ADMIN — CHLORHEXIDINE GLUCONATE 15 ML: 1.2 SOLUTION ORAL at 08:54

## 2025-03-16 RX ADMIN — BENZONATATE 200 MG: 100 CAPSULE ORAL at 21:01

## 2025-03-16 RX ADMIN — BUDESONIDE 0.5 MG: 0.5 INHALANT RESPIRATORY (INHALATION) at 07:26

## 2025-03-16 RX ADMIN — FORMOTEROL FUMARATE DIHYDRATE 20 MCG: 20 SOLUTION RESPIRATORY (INHALATION) at 19:54

## 2025-03-16 RX ADMIN — CHLORHEXIDINE GLUCONATE 15 ML: 1.2 SOLUTION ORAL at 21:01

## 2025-03-16 RX ADMIN — LEVALBUTEROL HYDROCHLORIDE 1.25 MG: 1.25 SOLUTION RESPIRATORY (INHALATION) at 19:54

## 2025-03-16 RX ADMIN — LEVALBUTEROL HYDROCHLORIDE 1.25 MG: 1.25 SOLUTION RESPIRATORY (INHALATION) at 13:48

## 2025-03-16 RX ADMIN — FORMOTEROL FUMARATE DIHYDRATE 20 MCG: 20 SOLUTION RESPIRATORY (INHALATION) at 07:28

## 2025-03-16 RX ADMIN — METHYLPREDNISOLONE SODIUM SUCCINATE 40 MG: 40 INJECTION, POWDER, FOR SOLUTION INTRAMUSCULAR; INTRAVENOUS at 21:00

## 2025-03-16 NOTE — CASE MANAGEMENT
Case Management Assessment & Discharge Planning Note    Patient name Natalio Hartmann Jr.  Location MICU 13/MICU 13 MRN [id number]  : 1955 Date 3/16/2025       Current Admission Date: 3/14/2025  Current Admission Diagnosis:Acute on chronic respiratory failure with hypoxia and hypercapnia ([facility])   Patient Active Problem List    Diagnosis Date Noted Date Diagnosed    Iron deficiency anemia secondary to inadequate dietary iron intake 2024     SIADH (syndrome of inappropriate ADH production) ([facility]) 2024     Type 2 diabetes mellitus without complication, with long-term current use of insulin ([facility]) 2024     End stage COPD ([facility]) 2024     Dependence on respirator (ventilator) status ([facility]) 01/10/2024     Severe protein-calorie malnutrition ([facility]) 10/14/2023     History of bladder cancer 2023     Pulmonary cachexia due to COPD ([facility])      Chronic hypercapnia/KEVIN 2023     Metabolic encephalopathy 2023     Palliative care patient 2023     Alkalosis 2023     Malnutrition ([facility]) 06/10/2023     Hyperlipidemia 2023     COPD exacerbation ([facility]) 2023     Steroid-induced hyperglycemia 2023     Physical deconditioning 2023     Chronic anemia 2023     SIRS (systemic inflammatory response syndrome) 2023     Hyponatremia 2023     Acute on chronic systolic congestive heart failure ([facility]) 2022     Pulmonary nodules/lesions, multiple 2022     Prostate cancer ([facility]) 2021     BPH with obstruction/lower urinary tract symptoms 2021     Goals of care, counseling/discussion 2021     Acute on chronic respiratory failure with hypoxia and hypercapnia ([facility]) 2020     Macrocytosis without anemia 2019     Other insomnia 2019     GERD (gastroesophageal reflux disease) 2017     Nonobstructive atherosclerosis of coronary artery 2016     Mood disorder ([facility]) 2015     Prediabetes 2015      Osteoporosis 10/14/2014     Vitamin D deficiency 10/14/2014     Nonischemic cardiomyopathy (HCC) 09/26/2014     Left bundle-branch block 08/03/2013     Osteoarthritis 08/03/2013     Obstructive sleep apnea 07/29/2013       LOS (days): 2  Geometric Mean LOS (GMLOS) (days):   Days to GMLOS:     OBJECTIVE:    Risk of Unplanned Readmission Score: 24.11         Current admission status: Inpatient       Preferred Pharmacy:   [id number] - [place], [place] - [address]  [address] [place]  [place] [address]  Phone: [phone] Fax: [phone]    Primary Care Provider: Fatmata Gustafson DO    Primary Insurance: BLUE CROSS  Secondary Insurance: MEDICARE    ASSESSMENT:  Active Health Care Proxies       Mellisa Hartmann [facility] Care Representative - Spouse   Primary Phone: [phone] (Mobile)  Home Phone: [phone]                                Patient Information  Admitted from:: Home  Mental Status: Alert  During Assessment patient was accompanied by: Not accompanied during assessment  Assessment information provided by:: Spouse  Primary Caregiver: Self  Support Systems: Spouse/significant other, Son  [place]  Type of Current Residence: [facility]  Living Arrangements: Lives w/ Spouse/significant other, Lives w/ Son    Activities of Daily Living Prior to Admission  Functional Status: Independent  Completes ADLs independently?: Yes  Ambulates independently?: Yes  Does patient use assisted devices?: Yes  Assisted Devices (DME) used: Home Oxygen concentrator, Portable Oxygen tanks, Wheelchair, Walker, Shower Chair, Hospital Bed, Other (Comment) (Trilogy through [employer])  DME Company Name (respiratory supplies): [email or web address]  O2 Rate(s): 4L NC  Does patient currently own DME?: Yes  What DME does the patient currently own?: Walker, Wheelchair, Shower Chair  Does patient have a history of Outpatient Therapy (PT/OT)?: No  Does the patient have a history of Short-Term Rehab?: No  Does  patient have a history of HHC?: No  Does patient currently have HHC?: No (recommended at last d/c from hospital in February, pt declined at time of d/c)         Patient Information Continued  Does patient have prescription coverage?: Yes  Can the patient afford their medications and any related supplies (such as glucometers or test strips)?: N/A  Does patient receive dialysis treatments?: No         Means of Transportation  Means of Transport to Appts:: Family transport          DISCHARGE DETAILS:    Discharge planning discussed with:: Pt spouseMellisa  Freedom of Choice: Yes  Comments - Freedom of Choice: Choice reviewed  CM contacted family/caregiver?: Yes             Contacts  Patient Contacts: [place]  Relationship to Patient:: Family  Contact Method: Phone  Phone Number: [phone]  Reason/Outcome: Continuity of Care, Emergency Contact, Discharge Planning                                                                     Additional Comments: Initial CM assessment completed with pt spouseMellisa. Pt resides at home w spouse and adult son. No services in place currently. No questions for CM at this time. PT/OT pending. CM following.

## 2025-03-16 NOTE — ED ATTENDING ATTESTATION
3/14/2025  I, Johnny Hall MD, saw and evaluated the patient. I have discussed the patient with the resident/non-physician practitioner and agree with the resident's/non-physician practitioner's findings, Plan of Care, and MDM as documented in the resident's/non-physician practitioner's note, except where noted. All available labs and Radiology studies were reviewed.  I was present for key portions of any procedure(s) performed by the resident/non-physician practitioner and I was immediately available to provide assistance.       At this point I agree with the current assessment done in the Emergency Department.  I have conducted an independent evaluation of this patient a history and physical is as follows:    ED Course     Patient presents for evaluation due to 1 day of worsening shortness of breath.  Patient has a history of severe COPD and has been hospitalized multiple times in the past.  Patient was at his usual state of health which typically requires 4 L of oxygen until this morning when he had significant difficulty breathing.  Upon EMS arrival, patient was tripoding and not moving much oxygen.  He was given 2 DuoNebs and steroids en route with slight improvement in that he was starting to move a small amount of oxygen.  On arrival, patient appears sleepy and in respiratory distress.  No additional history.  Exam: Sleepy but arousable, severe respiratory distress, RRR, diffuse wheezing bilaterally, S/NT/ND. A/P: Respiratory distress, COPD exacerbation.  Will check labs, chest x-ray to evaluate for pneumonia, and will give another breathing treatment.  Given the patient had some improvement with the first 2, will attempt to give an additional treatment.  Will place patient on BiPAP due to work of breathing.  If no improvement, will need to intubate.  Will monitor closely.    Critical Care Time Statement: Upon my evaluation, this patient had a high probability of imminent or life-threatening  deterioration due to respiratory distress, which required my direct attention, intervention, and personal management.  I spent a total of 48 minutes directly providing critical care services, including evaluating for the presence of life-threatening injuries or illnesses and complex medical decision making (to support/prevent further life-threatening deterioration).. This time is exclusive of procedures, teaching, treating other patients, family meetings, and any prior time recorded by providers other than myself.       Critical Care Time  Procedures

## 2025-03-16 NOTE — PLAN OF CARE
Problem: Potential for Falls  Goal: Patient will remain free of falls  Description: INTERVENTIONS:  - Educate patient/family on patient safety including physical limitations  - Instruct patient to call for assistance with activity   - Consult OT/PT to assist with strengthening/mobility   - Keep Call bell within reach  - Keep bed low and locked with side rails adjusted as appropriate  - Keep care items and personal belongings within reach  - Initiate and maintain comfort rounds  - Make Fall Risk Sign visible to staff  - Apply yellow socks and bracelet for high fall risk patients  - Consider moving patient to room near nurses station  Outcome: Not Progressing     Problem: PAIN - ADULT  Goal: Verbalizes/displays adequate comfort level or baseline comfort level  Description: Interventions:  - Encourage patient to monitor pain and request assistance  - Assess pain using appropriate pain scale  - Administer analgesics based on type and severity of pain and evaluate response  - Implement non-pharmacological measures as appropriate and evaluate response  - Consider cultural and social influences on pain and pain management  - Notify physician/advanced practitioner if interventions unsuccessful or patient reports new pain  Outcome: Not Progressing     Problem: INFECTION - ADULT  Goal: Absence or prevention of progression during hospitalization  Description: INTERVENTIONS:  - Assess and monitor for signs and symptoms of infection  - Monitor lab/diagnostic results  - Monitor all insertion sites, i.e. indwelling lines, tubes, and drains  - Monitor endotracheal if appropriate and nasal secretions for changes in amount and color  - [place] appropriate cooling/warming therapies per order  - Administer medications as ordered  - Instruct and encourage patient and family to use good hand hygiene technique  - Identify and instruct in appropriate isolation precautions for identified infection/condition  Outcome: Not  Progressing  Goal: Absence of fever/infection during neutropenic period  Description: INTERVENTIONS:  - Monitor WBC    Outcome: Not Progressing     Problem: SAFETY ADULT  Goal: Patient will remain free of falls  Description: INTERVENTIONS:  - Educate patient/family on patient safety including physical limitations  - Instruct patient to call for assistance with activity   - Consult OT/PT to assist with strengthening/mobility   - Keep Call bell within reach  - Keep bed low and locked with side rails adjusted as appropriate  - Keep care items and personal belongings within reach  - Initiate and maintain comfort rounds  - Make Fall Risk Sign visible to staff  - Apply yellow socks and bracelet for high fall risk patients  - Consider moving patient to room near nurses station  Outcome: Not Progressing  Goal: Maintain or return to baseline ADL function  Description: INTERVENTIONS:  -  Assess patient's ability to carry out ADLs; assess patient's baseline for ADL function and identify physical deficits which impact ability to perform ADLs (bathing, care of mouth/teeth, toileting, grooming, dressing, etc.)  - Assess/evaluate cause of self-care deficits   - Assess range of motion  - Assess patient's mobility; develop plan if impaired  - Assess patient's need for assistive devices and provide as appropriate  - Encourage maximum independence but intervene and supervise when necessary  - Involve family in performance of ADLs  - Assess for home care needs following discharge   - Consider OT consult to assist with ADL evaluation and planning for discharge  - Provide patient education as appropriate  Outcome: Not Progressing  Goal: Maintains/Returns to pre admission functional level  Description: INTERVENTIONS:  - Perform AM-PAC 6 Click Basic Mobility/ Daily Activity assessment daily.  - Set and communicate daily mobility goal to care team and patient/family/caregiver.   - Collaborate with rehabilitation services on mobility goals  if consulted  - Record patient progress and toleration of activity level   Outcome: Not Progressing     Problem: DISCHARGE PLANNING  Goal: Discharge to home or other facility with appropriate resources  Description: INTERVENTIONS:  - Identify barriers to discharge w/patient and caregiver  - Arrange for needed discharge resources and transportation as appropriate  - Identify discharge learning needs (meds, wound care, etc.)  - Arrange for interpretive services to assist at discharge as needed  - Refer to Case Management Department for coordinating discharge planning if the patient needs post-hospital services based on physician/advanced practitioner order or complex needs related to functional status, cognitive ability, or social support system  Outcome: Not Progressing     Problem: Knowledge Deficit  Goal: Patient/family/caregiver demonstrates understanding of disease process, treatment plan, medications, and discharge instructions  Description: Complete learning assessment and assess knowledge base.  Interventions:  - Provide teaching at level of understanding  - Provide teaching via preferred learning methods  Outcome: Not Progressing     Problem: Nutrition/Hydration-ADULT  Goal: Nutrient/Hydration intake appropriate for improving, restoring or maintaining nutritional needs  Description: Monitor and assess patient's nutrition/hydration status for malnutrition. Collaborate with interdisciplinary team and initiate plan and interventions as ordered.  Monitor patient's weight and dietary intake as ordered or per policy. Utilize nutrition screening tool and intervene as necessary. Determine patient's food preferences and provide high-protein, high-caloric foods as appropriate.     INTERVENTIONS:  - Monitor oral intake, urinary output, labs, and treatment plans  - Assess nutrition and hydration status and recommend course of action  - Evaluate amount of meals eaten  - Assist patient with eating if necessary   - Allow  adequate time for meals  - Recommend/ encourage appropriate diets, oral nutritional supplements, and vitamin/mineral supplements  - Order, calculate, and assess calorie counts as needed  - Recommend, monitor, and adjust tube feedings and TPN/PPN based on assessed needs  - Assess need for intravenous fluids  - Provide specific nutrition/hydration education as appropriate  - Include patient/family/caregiver in decisions related to nutrition  Outcome: Not Progressing     Problem: Prexisting or High Potential for Compromised Skin Integrity  Goal: Skin integrity is maintained or improved  Description: INTERVENTIONS:  - Identify patients at risk for skin breakdown  - Assess and monitor skin integrity  - Assess and monitor nutrition and hydration status  - Monitor labs   - Assess for incontinence   - Turn and reposition patient  - Assist with mobility/ambulation  - Relieve pressure over bony prominences  - Avoid friction and shearing  - Provide appropriate hygiene as needed including keeping skin clean and dry  - Evaluate need for skin moisturizer/barrier cream  - Collaborate with interdisciplinary team   - Patient/family teaching  - Consider wound care consult   Outcome: Not Progressing

## 2025-03-16 NOTE — PROGRESS NOTES
Progress Note - Critical Care/ICU   Name: Natalio Hartmann Jr. 69 y.o. male I MRN: [id number]  Unit/Bed#: MICU 13 I Date of Admission: 3/14/2025   Date of Service: 3/16/2025 I Hospital Day: 2       Assessment & Plan   Active Hospital Problems    Diagnosis Date Noted POA    Acute on chronic respiratory failure with hypoxia and hypercapnia (HCC) 11/19/2020 Yes    Severe protein-calorie malnutrition (HCC) 10/14/2023 Yes    Metabolic encephalopathy 07/20/2023 Yes    Malnutrition (HCC) 06/10/2023 Yes    Hyperlipidemia 05/02/2023 Yes    COPD exacerbation (HCC) 04/28/2023 Yes    SIRS (systemic inflammatory response syndrome) 02/17/2023 Yes    Acute on chronic systolic congestive heart failure (HCC) 09/12/2022 Yes    Goals of care, counseling/discussion 02/25/2021 Not Applicable    Obstructive sleep apnea 07/29/2013 Yes      Resolved Hospital Problems   No resolved problems to display.       Neuro:   Diagnosis: Acute metabolic encephalopathy  Most likely 2/2 hypoxic hypercarbia   Improving  Plan: CAM-ICU  Delirium precuations  Avoid neurotoxin  On bipap        CV:   Diagnosis: Acute on chronic HEFrEF  Nonischemic cardiomyopathy  BNP elevated 1079  Echo: EF 25% systolic function severely reduced severe global hypokinesis LA moderately dilated mild TR, no aortic stenosis, trace MR, mild TR normal RVSP, no pericardial effusion   Home diuretic regimen: Torsemide 20 mg alternating with 40 mg QOD  Outpatient cardiology notes review recommendation for palliative care   Plan:   IV Lasix 20 mg BID  Monitor U/O  Monitor daily  Monitor BMP and electrolytes   Cardiology reccs apreciated    Pulm:  Diagnosis: Acute on chronic respiratory failure with hypoxia and hypercarbia  Multifactorial with COPD and CHF exacerbation  Bipap required in the ED  VBG in ED: 7.1/ 146  NC at home baseline 4L   Pro-Jorge Alberto negative BNP pending.   X-ray reviewed cardiomegaly, pulmonary congestion noted  Plan:   Continue supplemental oxygen goal oxygen >88%  Bipap at  night and as needed during day  Encourage incentive spirometry  Diuretics for CHF exacerbation and respiratory treatment for COPD  Consider Diamox for diuresis.  IV solumderol and azithromycin      COPD exacerbation:  Vbg 7.1/146  Required Bipap in the ED   Plan:   Continue supplemental oxygen goal oxygen >88%  Bipap at night and as needed during day  Encourage incentive spirometry  respiratory treatment for COPD  IV solumderol and azithromycin      KEVIN:  On trilogy NIV at night  Bipap at night     GI:   Diagnosis: Severe protein-calorie malnutrition  Plan: Consult nutrition    :   No active issues    F/E/N:   F: None  E: Replete electrolytes as needed   N: NPO     Heme/Onc:   Diagnosis: Macrocytic anemia, chronic  Baseline around 9ish    Most likely nutritional deficiency  Plan: Monitor Hgb and transfuse <7  Continue DVT ppx with Lovenox 40 mg sq.    Endo:   No active issues    ID:   Diagnosis: SIRS Criteria  Met SIRS criteria upon admission   Sepsis workup done admission  S/p Empiric treatment with ceftraixone for PNA  Plan:   Continue CTX for PNA  Follow-up on cultures    MSK/Skin:   No active issues  Disposition: Critical care    ICU Core Measures     A: Assess, Prevent, and Manage Pain Has pain been assessed? NA  Need for changes to pain regimen? NA   B: Both SAT/SAT  N/A   C: Choice of Sedation RASS Goal: 0 Alert and Calm or N/A patient not on sedation  Need for changes to sedation or analgesia regimen? NA   D: Delirium CAM-ICU: Negative   E: Early Mobility  Plan for early mobility? Yes   F: Family Engagement Plan for family engagement today? Yes       Antibiotic Review: Awaiting culture results.       Prophylaxis:  VTE VTE covered by:  enoxaparin, Subcutaneous, 40 mg at 03/15/25 0852       Stress Ulcer  not ordered         24 Hour Events :   Subjective       Objective :                   Vitals I/O      Most Recent Min/Max in 24hrs   Temp 98.3 °F (36.8 °C) Temp  Min: 96.5 °F (35.8 °C)  Max: 98.5 °F  (36.9 °C)   Pulse 73 Pulse  Min: 70  Max: 108   Resp 17 Resp  Min: 15  Max: 38   /63 BP  Min: 89/57  Max: 127/78   O2 Sat 98 % SpO2  Min: 78 %  Max: 100 %      Intake/Output Summary (Last 24 hours) at 3/16/2025 0605  Last data filed at 3/15/2025 1901  Gross per 24 hour   Intake 80 ml   Output 425 ml   Net -345 ml       Diet NPO    Invasive Monitoring           Physical Exam   Physical Exam  Vitals reviewed.   Skin:     General: Skin is warm.   HENT:      Head: Normocephalic.   Cardiovascular:      Rate and Rhythm: Normal rate and regular rhythm.      Pulses: Normal pulses.   Musculoskeletal:         General: Swelling present.   Abdominal:      Palpations: Abdomen is soft.   Constitutional:       Appearance: He is ill-appearing.   Pulmonary:      Effort: Pulmonary effort is normal.      Breath sounds: Wheezing present.   Neurological:      Mental Status: He is oriented to person, place and time.          Diagnostic Studies        Lab Results: I have reviewed the following results:     Medications:  Scheduled PRN   azithromycin, 250 mg, Q24H  benzonatate, 200 mg, TID  budesonide, 0.5 mg, Q12H  cefTRIAXone, 1,000 mg, Q24H  chlorhexidine, 15 mL, Q12H GABE  docusate sodium, 100 mg, BID  enoxaparin, 40 mg, Daily  formoterol, 20 mcg, Q12H  furosemide, 40 mg, BID  ipratropium, 0.5 mg, TID  levalbuterol, 1.25 mg, TID  methylPREDNISolone sodium succinate, 40 mg, Q12H GABE  polyethylene glycol, 17 g, Daily      acetaminophen, 650 mg, Q6H PRN  aluminum-magnesium hydroxide-simethicone, 30 mL, Q6H PRN  dextromethorphan-guaiFENesin, 10 mL, Q4H PRN  ondansetron, 4 mg, Q6H PRN       Continuous          Labs:   CBC    Recent Labs     03/15/25  0414 03/16/25  0509   WBC 4.48 2.81*   HGB 10.9* 10.3*   HCT 37.6 34.8*    178     BMP    Recent Labs     03/14/25  1039 03/15/25  0414   SODIUM 136 139   K 4.6 4.4   CL 84* 91*   CO2 >45* 44*   AGAP  --  4   BUN 38* 36*   CREATININE 1.15 0.75   CALCIUM 8.7 8.2*       Coags    Recent  Labs     03/14/25  1139   INR 0.84*   PTT 22*        Additional Electrolytes  Recent Labs     03/15/25  0414   MG 2.1   PHOS 4.7*          Blood Gas    No recent results  Recent Labs     03/15/25  2005   PHVEN 7.334   XYO9DUY 95.0*   PO2VEN 53.7*   FXA2PMV 49.4*   BEVEN 19.3   I4YURDV 83.8*    LFTs  Recent Labs     03/14/25  1039 03/15/25  0414   ALT 57* 88*   AST 88* 118*   ALKPHOS 60 52   ALB 4.2 3.6   TBILI 0.71 0.41       Infectious  Recent Labs     03/14/25  1039 03/15/25  0414   PROCALCITONI 0.21 0.42*     Glucose  Recent Labs     03/14/25  1039 03/15/25  0414   GLUC 132 101

## 2025-03-16 NOTE — RESPIRATORY THERAPY NOTE
03/16/25 0725   Respiratory Assessment   Assessment Type Assess only   General Appearance Sleeping   Respiratory Pattern Assisted;Dyspnea with exertion   Chest Assessment Chest expansion symmetrical   Bilateral Breath Sounds Coarse;Expiratory wheezes;Inspiratory wheezes   Resp Comments weaned pressure to 16/6 40%   Non-Invasive Information   O2 Interface Device Face mask   Non-Invasive Ventilation Mode BiPAP   SpO2 97 %   $ Pulse Oximetry Spot Check Charge Completed   Non-Invasive Settings   IPAP (cm) 16 cm   EPAP (cm) 6 cm   Rate (Set) 16   FiO2 (%) 40   Pressure Support (cm H2O) 14   Rise Time 3   Inspiratory Time (Set) 0.75   Non-Invasive Readings   Total Rate 20   Vt (mL) (Mech) 385   MV (Mech) 6.8   Peak Pressure (Obs) 16   Leak (lpm) 30   Skin Intervention Skin intact   Non-Invasive Alarms   Insp Pressure High (cm H20) 40   Insp Pressure Low (cm H20) 3   MV Low (L/min) 2   Vt High (mL) 1100   Vt Low (mL) 200   High Resp Rate (BPM) 40 BPM   Low Resp Rate (BPM) 2 BPM   Apnea Interval (sec) 20   Apnea Rate 14

## 2025-03-17 ENCOUNTER — PATIENT OUTREACH (OUTPATIENT)
Dept: CARDIOLOGY CLINIC | Facility: CLINIC | Age: 70
End: 2025-03-17

## 2025-03-17 DIAGNOSIS — I50.9 HEART FAILURE CASE MANAGEMENT PATIENT (HCC): Primary | ICD-10-CM

## 2025-03-17 LAB
ALBUMIN SERPL BCG-MCNC: 3.8 G/DL (ref 3.5–5)
ALP SERPL-CCNC: 50 U/L (ref 34–104)
ALT SERPL W P-5'-P-CCNC: 59 U/L (ref 7–52)
AORTIC ROOT: 3.6 CM
AORTIC VALVE MEAN VELOCITY: 7.4 M/S
ASCENDING AORTA: 3.1 CM
AST SERPL W P-5'-P-CCNC: 46 U/L (ref 13–39)
ATRIAL RATE: 112 BPM
ATRIAL RATE: 61 BPM
ATRIAL RATE: 64 BPM
ATRIAL RATE: 66 BPM
ATRIAL RATE: 80 BPM
ATRIAL RATE: 82 BPM
ATRIAL RATE: 83 BPM
ATRIAL RATE: 89 BPM
ATRIAL RATE: 97 BPM
AV LVOT MEAN GRADIENT: 1 MMHG
AV LVOT PEAK GRADIENT: 2 MMHG
AV MEAN PRESS GRAD SYS DOP V1V2: 3 MMHG
AV PEAK GRADIENT: 5 MMHG
AV VELOCITY RATIO: 0.59
AV VMAX SYS DOP: 1.1 M/S
BASE EX.OXY STD BLDV CALC-SCNC: 85.6 % (ref 60–80)
BASE EX.OXY STD BLDV CALC-SCNC: 94.5 % (ref 60–80)
BASE EXCESS BLDV CALC-SCNC: 14 MMOL/L
BASE EXCESS BLDV CALC-SCNC: 16.9 MMOL/L
BASOPHILS # BLD AUTO: 0 THOUSANDS/ÂΜL (ref 0–0.1)
BASOPHILS NFR BLD AUTO: 0 % (ref 0–1)
BILIRUB SERPL-MCNC: 0.48 MG/DL (ref 0.2–1)
BSA FOR ECHO PROCEDURE: 1.44 M2
BUN SERPL-MCNC: 44 MG/DL (ref 5–25)
CALCIUM SERPL-MCNC: 8.5 MG/DL (ref 8.4–10.2)
CHLORIDE SERPL-SCNC: 88 MMOL/L (ref 96–108)
CO2 SERPL-SCNC: >45 MMOL/L (ref 21–32)
CREAT SERPL-MCNC: 0.68 MG/DL (ref 0.6–1.3)
DOP CALC AO VTI: 23.4 CM
DOP CALC LVOT PEAK VEL VTI: 13.77 CM
DOP CALC LVOT PEAK VEL: 0.67 M/S
E WAVE DECELERATION TIME: 84 MS
E/A RATIO: 0.6
EOSINOPHIL # BLD AUTO: 0 THOUSAND/ÂΜL (ref 0–0.61)
EOSINOPHIL NFR BLD AUTO: 0 % (ref 0–6)
ERYTHROCYTE [DISTWIDTH] IN BLOOD BY AUTOMATED COUNT: 13.6 % (ref 11.6–15.1)
FRACTIONAL SHORTENING: 13 (ref 28–44)
GFR SERPL CREATININE-BSD FRML MDRD: 98 ML/MIN/1.73SQ M
GLUCOSE SERPL-MCNC: 144 MG/DL (ref 65–140)
HCO3 BLDV-SCNC: 45.1 MMOL/L (ref 24–30)
HCO3 BLDV-SCNC: 45.9 MMOL/L (ref 24–30)
HCT VFR BLD AUTO: 35.8 % (ref 36.5–49.3)
HGB BLD-MCNC: 10.5 G/DL (ref 12–17)
IMM GRANULOCYTES # BLD AUTO: 0.02 THOUSAND/UL (ref 0–0.2)
IMM GRANULOCYTES NFR BLD AUTO: 1 % (ref 0–2)
INTERVENTRICULAR SEPTUM IN DIASTOLE (PARASTERNAL SHORT AXIS VIEW): 0.8 CM
INTERVENTRICULAR SEPTUM: 0.8 CM (ref 0.6–1.1)
LAAS-AP2: 16.3 CM2
LAAS-AP4: 17.9 CM2
LEFT ATRIUM SIZE: 3.9 CM
LEFT ATRIUM VOLUME (MOD BIPLANE): 55 ML
LEFT ATRIUM VOLUME INDEX (MOD BIPLANE): 38.2 ML/M2
LEFT INTERNAL DIMENSION IN SYSTOLE: 5.3 CM (ref 2.1–4)
LEFT VENTRICLE DIASTOLIC VOLUME (MOD BIPLANE): 188 ML
LEFT VENTRICLE DIASTOLIC VOLUME INDEX (MOD BIPLANE): 130.6 ML/M2
LEFT VENTRICLE SYSTOLIC VOLUME (MOD BIPLANE): 146 ML
LEFT VENTRICLE SYSTOLIC VOLUME INDEX (MOD BIPLANE): 101.4 ML/M2
LEFT VENTRICULAR INTERNAL DIMENSION IN DIASTOLE: 6.1 CM (ref 3.5–6)
LEFT VENTRICULAR POSTERIOR WALL IN END DIASTOLE: 0.8 CM
LEFT VENTRICULAR STROKE VOLUME: 49 ML
LV EF BIPLANE MOD: 23 %
LV EF US.2D.A4C+ESTIMATED: 9 %
LVSV (TEICH): 49 ML
LYMPHOCYTES # BLD AUTO: 0.18 THOUSANDS/ÂΜL (ref 0.6–4.47)
LYMPHOCYTES NFR BLD AUTO: 4 % (ref 14–44)
MAGNESIUM SERPL-MCNC: 2.3 MG/DL (ref 1.9–2.7)
MCH RBC QN AUTO: 30.4 PG (ref 26.8–34.3)
MCHC RBC AUTO-ENTMCNC: 29.3 G/DL (ref 31.4–37.4)
MCV RBC AUTO: 104 FL (ref 82–98)
MONOCYTES # BLD AUTO: 0.3 THOUSAND/ÂΜL (ref 0.17–1.22)
MONOCYTES NFR BLD AUTO: 7 % (ref 4–12)
MV E'TISSUE VEL-SEP: 5 CM/S
MV PEAK A VEL: 0.85 M/S
MV PEAK E VEL: 51 CM/S
MV STENOSIS PRESSURE HALF TIME: 24 MS
MV VALVE AREA P 1/2 METHOD: 9.2
NASAL CANNULA: 4
NEUTROPHILS # BLD AUTO: 3.67 THOUSANDS/ÂΜL (ref 1.85–7.62)
NEUTS SEG NFR BLD AUTO: 88 % (ref 43–75)
NRBC BLD AUTO-RTO: 0 /100 WBCS
O2 CT BLDV-SCNC: 14.1 ML/DL
O2 CT BLDV-SCNC: 16.2 ML/DL
P AXIS: 78 DEGREES
P AXIS: 79 DEGREES
P AXIS: 79 DEGREES
P AXIS: 80 DEGREES
P AXIS: 81 DEGREES
P AXIS: 83 DEGREES
P AXIS: 86 DEGREES
PCO2 BLDV: 103.1 MM HG (ref 42–50)
PCO2 BLDV: 81.9 MM HG (ref 42–50)
PH BLDV: 7.26 [PH] (ref 7.3–7.4)
PH BLDV: 7.37 [PH] (ref 7.3–7.4)
PHOSPHATE SERPL-MCNC: 3.4 MG/DL (ref 2.3–4.1)
PLATELET # BLD AUTO: 173 THOUSANDS/UL (ref 149–390)
PMV BLD AUTO: 9.8 FL (ref 8.9–12.7)
PO2 BLDV: 57 MM HG (ref 35–45)
PO2 BLDV: 86.6 MM HG (ref 35–45)
POTASSIUM SERPL-SCNC: 3.7 MMOL/L (ref 3.5–5.3)
PR INTERVAL: 152 MS
PR INTERVAL: 154 MS
PR INTERVAL: 160 MS
PR INTERVAL: 162 MS
PR INTERVAL: 168 MS
PR INTERVAL: 170 MS
PR INTERVAL: 170 MS
PR INTERVAL: 174 MS
PR INTERVAL: 178 MS
PROT SERPL-MCNC: 5.9 G/DL (ref 6.4–8.4)
QRS AXIS: 75 DEGREES
QRS AXIS: 77 DEGREES
QRS AXIS: 78 DEGREES
QRS AXIS: 81 DEGREES
QRS AXIS: 82 DEGREES
QRS AXIS: 84 DEGREES
QRS AXIS: 86 DEGREES
QRSD INTERVAL: 132 MS
QRSD INTERVAL: 136 MS
QRSD INTERVAL: 136 MS
QRSD INTERVAL: 140 MS
QRSD INTERVAL: 142 MS
QRSD INTERVAL: 146 MS
QRSD INTERVAL: 150 MS
QT INTERVAL: 360 MS
QT INTERVAL: 400 MS
QT INTERVAL: 420 MS
QT INTERVAL: 426 MS
QT INTERVAL: 440 MS
QT INTERVAL: 446 MS
QT INTERVAL: 460 MS
QT INTERVAL: 470 MS
QT INTERVAL: 486 MS
QTC INTERVAL: 474 MS
QTC INTERVAL: 486 MS
QTC INTERVAL: 489 MS
QTC INTERVAL: 491 MS
QTC INTERVAL: 491 MS
QTC INTERVAL: 492 MS
QTC INTERVAL: 517 MS
QTC INTERVAL: 521 MS
QTC INTERVAL: 533 MS
RA PRESSURE ESTIMATED: 15 MMHG
RBC # BLD AUTO: 3.45 MILLION/UL (ref 3.88–5.62)
RIGHT ATRIAL 2D VOLUME: 49 ML
RIGHT ATRIUM AREA SYSTOLE A4C: 17.1 CM2
RIGHT VENTRICLE ID DIMENSION: 4.4 CM
RV PSP: 42 MMHG
SL CV LEFT ATRIUM LENGTH A2C: 4.4 CM
SL CV PED ECHO LEFT VENTRICLE DIASTOLIC VOLUME (MOD BIPLANE) 2D: 187 ML
SL CV PED ECHO LEFT VENTRICLE SYSTOLIC VOLUME (MOD BIPLANE) 2D: 138 ML
SODIUM SERPL-SCNC: 141 MMOL/L (ref 135–147)
T WAVE AXIS: -67 DEGREES
T WAVE AXIS: -73 DEGREES
T WAVE AXIS: -76 DEGREES
T WAVE AXIS: -81 DEGREES
T WAVE AXIS: 141 DEGREES
T WAVE AXIS: 236 DEGREES
T WAVE AXIS: 256 DEGREES
T WAVE AXIS: 268 DEGREES
T WAVE AXIS: 76 DEGREES
TR MAX PG: 27 MMHG
TR PEAK VELOCITY: 2.6 M/S
TRICUSPID ANNULAR PLANE SYSTOLIC EXCURSION: 2.9 CM
TRICUSPID VALVE PEAK REGURGITATION VELOCITY: 2.62 M/S
VENTRICULAR RATE: 112 BPM
VENTRICULAR RATE: 61 BPM
VENTRICULAR RATE: 64 BPM
VENTRICULAR RATE: 66 BPM
VENTRICULAR RATE: 80 BPM
VENTRICULAR RATE: 82 BPM
VENTRICULAR RATE: 83 BPM
VENTRICULAR RATE: 89 BPM
VENTRICULAR RATE: 97 BPM
WBC # BLD AUTO: 4.17 THOUSAND/UL (ref 4.31–10.16)

## 2025-03-17 PROCEDURE — 93010 ELECTROCARDIOGRAM REPORT: CPT | Performed by: INTERNAL MEDICINE

## 2025-03-17 PROCEDURE — 84100 ASSAY OF PHOSPHORUS: CPT

## 2025-03-17 PROCEDURE — 82805 BLOOD GASES W/O2 SATURATION: CPT

## 2025-03-17 PROCEDURE — 94660 CPAP INITIATION&MGMT: CPT

## 2025-03-17 PROCEDURE — 94640 AIRWAY INHALATION TREATMENT: CPT

## 2025-03-17 PROCEDURE — 94760 N-INVAS EAR/PLS OXIMETRY 1: CPT

## 2025-03-17 PROCEDURE — 85025 COMPLETE CBC W/AUTO DIFF WBC: CPT

## 2025-03-17 PROCEDURE — 94664 DEMO&/EVAL PT USE INHALER: CPT

## 2025-03-17 PROCEDURE — 92610 EVALUATE SWALLOWING FUNCTION: CPT

## 2025-03-17 PROCEDURE — 80053 COMPREHEN METABOLIC PANEL: CPT

## 2025-03-17 PROCEDURE — 99291 CRITICAL CARE FIRST HOUR: CPT | Performed by: INTERNAL MEDICINE

## 2025-03-17 PROCEDURE — 83735 ASSAY OF MAGNESIUM: CPT

## 2025-03-17 PROCEDURE — 99232 SBSQ HOSP IP/OBS MODERATE 35: CPT | Performed by: INTERNAL MEDICINE

## 2025-03-17 PROCEDURE — 93005 ELECTROCARDIOGRAM TRACING: CPT

## 2025-03-17 RX ORDER — HEPARIN SODIUM 5000 [USP'U]/ML
5000 INJECTION, SOLUTION INTRAVENOUS; SUBCUTANEOUS EVERY 8 HOURS SCHEDULED
Status: DISCONTINUED | OUTPATIENT
Start: 2025-03-18 | End: 2025-04-04 | Stop reason: HOSPADM

## 2025-03-17 RX ORDER — WATER 10 ML/10ML
INJECTION INTRAMUSCULAR; INTRAVENOUS; SUBCUTANEOUS
Status: DISPENSED
Start: 2025-03-17 | End: 2025-03-17

## 2025-03-17 RX ORDER — ASPIRIN 81 MG/1
81 TABLET, CHEWABLE ORAL DAILY
Status: DISCONTINUED | OUTPATIENT
Start: 2025-03-17 | End: 2025-04-04 | Stop reason: HOSPADM

## 2025-03-17 RX ORDER — PRAVASTATIN SODIUM 80 MG/1
80 TABLET ORAL
Status: DISCONTINUED | OUTPATIENT
Start: 2025-03-17 | End: 2025-03-31

## 2025-03-17 RX ORDER — ALBUTEROL SULFATE 0.83 MG/ML
2.5 SOLUTION RESPIRATORY (INHALATION) ONCE
Status: COMPLETED | OUTPATIENT
Start: 2025-03-17 | End: 2025-03-17

## 2025-03-17 RX ADMIN — LEVALBUTEROL HYDROCHLORIDE 1.25 MG: 1.25 SOLUTION RESPIRATORY (INHALATION) at 13:44

## 2025-03-17 RX ADMIN — CEFTRIAXONE SODIUM 1000 MG: 10 INJECTION, POWDER, FOR SOLUTION INTRAVENOUS at 09:15

## 2025-03-17 RX ADMIN — IPRATROPIUM BROMIDE 0.5 MG: 0.5 SOLUTION RESPIRATORY (INHALATION) at 07:53

## 2025-03-17 RX ADMIN — IPRATROPIUM BROMIDE 0.5 MG: 0.5 SOLUTION RESPIRATORY (INHALATION) at 13:44

## 2025-03-17 RX ADMIN — METHYLPREDNISOLONE SODIUM SUCCINATE 40 MG: 40 INJECTION, POWDER, FOR SOLUTION INTRAMUSCULAR; INTRAVENOUS at 20:07

## 2025-03-17 RX ADMIN — ALBUTEROL SULFATE 2.5 MG: 2.5 SOLUTION RESPIRATORY (INHALATION) at 11:14

## 2025-03-17 RX ADMIN — CHLORHEXIDINE GLUCONATE 15 ML: 1.2 SOLUTION ORAL at 20:07

## 2025-03-17 RX ADMIN — BENZONATATE 200 MG: 100 CAPSULE ORAL at 09:38

## 2025-03-17 RX ADMIN — LEVALBUTEROL HYDROCHLORIDE 1.25 MG: 1.25 SOLUTION RESPIRATORY (INHALATION) at 07:53

## 2025-03-17 RX ADMIN — ASPIRIN 81 MG CHEWABLE TABLET 81 MG: 81 TABLET CHEWABLE at 16:21

## 2025-03-17 RX ADMIN — METHYLPREDNISOLONE SODIUM SUCCINATE 40 MG: 40 INJECTION, POWDER, FOR SOLUTION INTRAMUSCULAR; INTRAVENOUS at 09:38

## 2025-03-17 RX ADMIN — BENZONATATE 200 MG: 100 CAPSULE ORAL at 16:21

## 2025-03-17 RX ADMIN — FUROSEMIDE 40 MG: 10 INJECTION, SOLUTION INTRAVENOUS at 17:17

## 2025-03-17 RX ADMIN — IPRATROPIUM BROMIDE 0.5 MG: 0.5 SOLUTION RESPIRATORY (INHALATION) at 20:20

## 2025-03-17 RX ADMIN — PRAVASTATIN SODIUM 80 MG: 80 TABLET ORAL at 16:21

## 2025-03-17 RX ADMIN — BUDESONIDE 0.5 MG: 0.5 INHALANT RESPIRATORY (INHALATION) at 20:20

## 2025-03-17 RX ADMIN — FUROSEMIDE 40 MG: 10 INJECTION, SOLUTION INTRAVENOUS at 12:10

## 2025-03-17 RX ADMIN — ENOXAPARIN SODIUM 40 MG: 40 INJECTION SUBCUTANEOUS at 09:38

## 2025-03-17 RX ADMIN — CHLORHEXIDINE GLUCONATE 15 ML: 1.2 SOLUTION ORAL at 09:38

## 2025-03-17 RX ADMIN — BENZONATATE 200 MG: 100 CAPSULE ORAL at 20:07

## 2025-03-17 RX ADMIN — FORMOTEROL FUMARATE DIHYDRATE 20 MCG: 20 SOLUTION RESPIRATORY (INHALATION) at 20:20

## 2025-03-17 RX ADMIN — BUDESONIDE 0.5 MG: 0.5 INHALANT RESPIRATORY (INHALATION) at 07:53

## 2025-03-17 RX ADMIN — LEVALBUTEROL HYDROCHLORIDE 1.25 MG: 1.25 SOLUTION RESPIRATORY (INHALATION) at 20:20

## 2025-03-17 RX ADMIN — FORMOTEROL FUMARATE DIHYDRATE 20 MCG: 20 SOLUTION RESPIRATORY (INHALATION) at 07:55

## 2025-03-17 RX ADMIN — ACETAZOLAMIDE 250 MG: 500 INJECTION, POWDER, LYOPHILIZED, FOR SOLUTION INTRAVENOUS at 09:38

## 2025-03-17 RX ADMIN — AZITHROMYCIN MONOHYDRATE 500 MG: 500 INJECTION, POWDER, LYOPHILIZED, FOR SOLUTION INTRAVENOUS at 17:17

## 2025-03-17 NOTE — OCCUPATIONAL THERAPY NOTE
Occupational Therapy         Patient Name: Natalio Hartmann .  Today's Date: 3/17/2025       03/17/25 1300   OT Last Visit   OT Visit Date 03/17/25   Note Type   Note type Cancelled Session   Cancel Reasons Medical status   Additional Comments request to hold 2* respiratory status       Manjula Maria

## 2025-03-17 NOTE — SPEECH THERAPY NOTE
Speech Language/Pathology  Speech-Language Pathology Bedside Swallow Evaluation      Patient Name: Natalio Hartmann Jr.    Today's Date: 3/17/2025     Problem List  Principal Problem:    Acute on chronic respiratory failure with hypoxia and hypercapnia (HCC)  Active Problems:    Obstructive sleep apnea    Goals of care, counseling/discussion    Acute on chronic systolic congestive heart failure (HCC)    SIRS (systemic inflammatory response syndrome)    COPD exacerbation (HCC)    Hyperlipidemia    Malnutrition (HCC)    Metabolic encephalopathy    Severe protein-calorie malnutrition (HCC)      Past Medical History  Past Medical History:   Diagnosis Date    Acute metabolic encephalopathy 03/29/2022    Arthritis     Bladder mass     Cardiomyopathy (HCC)     Chest pain     COPD (chronic obstructive pulmonary disease) (HCC)     COVID-19 virus infection 02/23/2023    CPAP (continuous positive airway pressure) dependence     Emphysema of lung (HCC)     Hypoxia     nocturnal    Left bundle branch block     Multiple pulmonary nodules     last assessed: 10/12/16    Pneumonia     Sleep apnea, obstructive     Smoker     Weight loss 08/29/2019       Past Surgical History  Past Surgical History:   Procedure Laterality Date    COLONOSCOPY      CYSTOSCOPY      CYSTOSCOPY  02/17/2021    MT BLADDER INSTILLATION ANTICARCINOGENIC AGENT N/A 1/3/2020    Procedure: INSTILLATION MITOMYCIN;  Surgeon: Sundeep Canchola MD;  Location: [facility];  Service: Urology    MT CYSTO W/REMOVAL OF LESIONS SMALL N/A 1/3/2020    Procedure: TRANSURETHRAL RESECTION OF BLADDER TUMOR (TURBT);  Surgeon: Sundeep Canchola MD;  [facility];  Service: Urology    MT CYSTOURETHROSCOPY WITH BIOPSY N/A 4/13/2021    Procedure: CYSTOSCOPY WITH BIOPSIES;  Surgeon: Sundeep Canchola MD;  [facility];  Service: Urology    MT TRURL ELECTROSURG RESCJ PROSTATE BLEED COMPLETE N/A 4/13/2021    Procedure: TRANSURETHRAL RESECTION OF PROSTATE (TURP) BLADDER BIOPSY,  FULGURATION;  Surgeon: Sundeep Canchola MD;  [facility];  Service: Urology       Summary   Pt presented with s/s suggestive of minimal and mild oral and suspected minimal and mild pharyngeal dysphagia.  Symptoms or concerns included decreased bolus propulsion, decreased mastication, and decreased work of breathing and reduced energy with exertion for any movement .      Risk/s for Aspiration: work of breathing and coordination of breathing w/ swallowing     Recommended Diet: soft/level 3 diet and thin liquids   Recommended Form of Meds: crushed with puree   Aspiration precautions and swallowing strategies: upright posture, only feed when fully alert, slow rate of feeding, small bites/sips, alternating bites and sips, and pace pt.  May do better with several smaller meals a day  Other Recommendations: Continue frequent oral care, will follow as able.  Pace pt and only allow a small amount at a time.         Current Medical Status  Pt is a 69 y.o. male who presented to [facility] with respiratory distress.      Current Precautions:   Fall  Aspiration  Contact  AIrborne  C diff   Seizure  Delirium    Allergies:  No known food allergies    Past medical history:  Please see H&P for details   very severe COPD FEV1 22%, chronic hypoxic and hypercapnic respiratory failure on 4 L supplemental O2 + Trilogy NIV at night, chronic prednisone 10 mg, COPD cachexia nonischemic cardiomyopathy EF 25%, KEVIN on BiPAP   Special Studies:  CXR-Cardiomegaly and prominent vasculature, possibly related to positioning though pulmonary vascular congestion is not excluded. Clinical correlation for congestive heart failure recommended.       Social/Education/Vocational Hx:  Pt lives with family    Swallow Information   Current Risks for Dysphagia & Aspiration:  poor respiration  Current Symptoms/Concerns: change in respiratory status and need for O2 that has increased.   Current Diet: NPO   Baseline Diet: regular diet and  thin liquids-      Baseline Assessment   Behavior/Cognition: alert  Speech/Language Status: able to participate in basic conversation-limited output  Patient Positioning: upright in bed  Pain Status/Interventions/Response to Interventions:   No report of or nonverbal indications of pain.       Swallow Mechanism Exam  Facial: symmetrical  Labial: bilateral decreased ROM and decreased strength  Lingual: decreased ROM and bilateral decreased strength  Velum: unable to visualize  Mandible:  decreased ROM  Dentition: edentulous  Vocal quality:weak and breathy    Volitional Cough: unable to initiate volitional cough   Respiratory Status: on NC    Consistencies Assessed and Performance   Consistencies Administered: ice chips, thin liquids, and puree-toast.        Oral Stage: mild, decreased bolus propulsion, and decreased mastication  Mastication was adequate with the materials administered today.  Bolus formation and transfer were functional with no significant oral residue noted.  No overt s/s reduced oral control.    Pharyngeal Stage: suspected, mild, suspected pharyngeal swallow delay, suspected decreased hyolaryngeal elevation upon palpation, suspected pharyngeal residue, and multiple swallows  Swallow Mechanics:  Swallowing initiation appeared prompt.  Laryngeal rise was palpated and judged to be within functional limits.  No coughing, throat clearing, change in vocal quality or respiratory status noted today.     Esophageal Concerns: none reported    Strategies and Efficacy: slow intake, small amounts    Summary and Recommendations (see above)    Results Reviewed with: patient, RN, and family     Treatment Recommended: yes     Frequency of treatment: as able     Patient Stated Goal:    Dysphagia LTG  -Patient will demonstrate safe and effective oral intake (without overt s/s significant oral/pharyngeal dysphagia including s/s penetration or aspiration) for the highest appropriate diet level.     Short Term  Goals:  -Pt will tolerate Dysphagia 3/advanced (dental soft) diet and thin liquid with no significant s/s oral or pharyngeal dysphagia across 1-3 diagnostic session/s.    -Patient will tolerate trials of upgraded food and/or liquid texture with no significant s/s of oral or pharyngeal dysphagia including aspiration across 1-3 diagnostic sessions     -Patient will comply with a Video/Modified Barium Swallow study for more complete assessment of swallowing anatomy/physiology/aspiration risk and to assess efficacy of treatment techniques so as to best guide treatment plan    Speech Therapy Prognosis   Prognosis:  guarded    Prognosis Considerations: medical status

## 2025-03-17 NOTE — PHYSICAL THERAPY NOTE
Physical Therapy Cancellation Note    PT orders received chart review completed. Pt is currently on Bipap and not appropriate to participate in skilled PT at this time. PT will follow and eval as medically appropriate.     03/17/25 1100   Note Type   Note type Evaluation;Cancelled Session   Cancel Reasons Medical status       Andreina Mcdowell, PT

## 2025-03-17 NOTE — PROGRESS NOTES
Heart Failure/ Pulmonary Hypertension Progress Note - Natalio Hartmann Jr. 69 y.o. male MRN: [id number]    Unit/Bed#: MICU 13 Encounter: [id number]      Assessment:    Principal Problem:    Acute on chronic respiratory failure with hypoxia and hypercapnia (HCC)  Active Problems:    Obstructive sleep apnea    Acute on chronic systolic congestive heart failure (HCC)    Goals of care, counseling/discussion    SIRS (systemic inflammatory response syndrome)    COPD exacerbation (HCC)    Hyperlipidemia    Malnutrition (HCC)    Metabolic encephalopathy    Severe protein-calorie malnutrition (HCC)      Subjective:   Natalio Hartmann Jr. is a 69 y.o. male with a PMH of severe COPD, chronic hypoxic and hypercapnic respiratory failure on 4 L supplemental oxygen, cachexia, nonischemic cardiomyopathy, HFrEF/Stage C/D who presents to the ER with worsening shortness of breath,  increasingly lethargic feeling, and fatigue.  On day of admit, he was visibly short of breath slumped in the chair noted by his wife and brought to the ER.       In the ED he is noted to be hypoxic and hypercapnic requiring BiPAP. BNP on admit>1000.  CXR shows pulmonary congestion. IV Lasix started in the ER.  Unable to wean off Bipap--admitted to MICU.      Patient seen and examined.  No significant events overnight.     Objective:   Intake/ Output: 1130/925/+205  Weight: bed weights  Tele: SR/ST  MAPs: 75-80 mmHg    Acute on chronic HFrEF; LVEF 20-25%; NYHA III; ACC/AHA Stage C/D  -Etiology: Nonischemic cardiomyopathy.  Possible dyssynchrony from chronic LBBB.  Despite QRS only being 120 ms, echo shows significant dyssynchrony.  -warm on exam, hemodynamically stable. He is cachectic with end stage COPD. Difficult to assess JVP given significant respiratory variation  -TTE findings suggestive of volume overload state  -agree with restarting IV loop diuretics. Would change BP hold parameters to ensure dose administration.   --ongoing discussions with patient and  family regarding the appropriateness of palliative care involvement. They continue to refuse.      TTE 3/16/25:LVEF 25%, RV dilated, atria severely dilated, mild TR, est PASP 42 mmHg, IVC dilated, est RAP 15 mmHg   TTE 9/6/24: LVEF 25%  TTE 5/1/23: LVEF 20%. Severe global hypokinesis with regional variation. RV cavity size normal, systolic function normal. Trace MR, TR.   TTE 2/21/23: LVEF 30%. LVIDd 6.6cm. severe global hypokinesis. Grade I DD. RV cavity size and systolic function normal. Mild TR.   TTE 2/12/2023: LVEF 20-25%.  Severe global hypokinesis with regional variation.  Grade 1 DD.  Abnormal septal motion consistent with LBBB.  RV cavity mildly dilated, normal systolic function.  Mild MR, mild TR.  RVSP 38.  Dilated IVC.  TTE 8/26/2022: LVEF 40%.  RV cavity mildly dilated.  Systolic function normal.  Mild MR, TR.  Normal IVC.     Neurohormonal Blockade: GDMT limited by hypotension  --Beta Blocker: no  --ARNi / ACEi / ARB: losartan 12.5 mg QD, off   --Aldosterone Antagonist: no   --SGLT2 Inhibitor: no  --Home Diuretic: torsemide 40 mg QD alternating with 20 mg QD  --Inpatient diuretic : Lasix 40 mg IV BID, Diamox 250 mg IV x 2     Sudden Cardiac Death Risk Reduction:  --ICD: LVEF 20%.      Electrical Resynchronization:  --Candidacy for BiV device: QRSd 120ms, chronic LBBB with dyssynchrony on echocardiogram.     Advanced Therapies (if appropriate): Not appropriate at this time, particularly considering advanced lung disease.        Acute on Chronic Respiratory Failure with hypoxia and hypercapnia   -Required BiPAP in the ER--pCO2 on admit 140  -Multifactorial with COPD and CHF exacerbation contributing  -O2 requirements coming down.   -IV steroids, IV Lasix per CC team.     COPD (end stage)  Former smoker; 105 pack years, quit in 2012.  Severe disease,   On home oxygen.  Multiple hospitalizations with acute COPD exacerbations   Follows with pulm    H/O COVID-19    Nonobstructive CAD  Has coronary calcium on  "CT  Previously on Rosuvastatin 10 mg. Notes indicate ASA, but not listed in EMR    Hyperlipidemia        Lab Results   Component Value Date     LDLCALC 75 08/19/2021    Continue statin    KEVIN   On BiPAP nightly    GOC.    -palliative care discussed. He is not interested       Review of Systems   All other systems reviewed and are negative.       Central Line (day, reason):  Stanley catheter (day, reason):    Vitals: Blood pressure 99/59, pulse 63, temperature (!) 97.4 °F (36.3 °C), temperature source Axillary, resp. rate 22, height 5' 2\" (1.575 m), weight 46 kg (101 lb 6.6 oz), SpO2 99%., Body mass index is 18.55 kg/m²., I/O last 3 completed shifts:  In: 1130 [P.O.:800; I.V.:30; IV Piggyback:300]  Out: 2125 [Urine:2125]  I/O this shift:  In: -   Out: 500 [Urine:500]  Wt Readings from Last 3 Encounters:   03/17/25 46 kg (101 lb 6.6 oz)   02/21/25 44.3 kg (97 lb 11.2 oz)   09/06/24 44.9 kg (99 lb)       Intake/Output Summary (Last 24 hours) at 3/17/2025 0829  Last data filed at 3/17/2025 0701  Gross per 24 hour   Intake 1130 ml   Output 1425 ml   Net -295 ml     I/O last 3 completed shifts:  In: 1130 [P.O.:800; I.V.:30; IV Piggyback:300]  Out: 2125 [Urine:2125]        Physical Exam:  Vitals:    03/17/25 0400 03/17/25 0500 03/17/25 0600 03/17/25 0755   BP: 103/58 106/67 99/59    Pulse: 58 67 63    Resp: 18 (!) 24 22    Temp: (!) 97.4 °F (36.3 °C)      TempSrc: Axillary      SpO2: 98% 98% 98% 99%   Weight:   46 kg (101 lb 6.6 oz)    Height:           GEN: Natalio Hartmann Jr. Is ill appearing,cachectic,  tachypneic,   HEENT: pupils equal, round, and reactive to light; extraocular muscles intact  NECK: supple, no carotid bruits   HEART: regular rhythm, normal S1 and S2, no murmurs, clicks, gallops or rubs, JVP is  elevated  LUNGS:diminished, coarse througout  ABDOMEN: normal bowel sounds, soft, no tenderness, no distention  EXTREMITIES: peripheral pulses normal; no clubbing, cyanosis, or edema  NEURO: no focal findings   SKIN: " normal without suspicious lesions on exposed skin      Current Facility-Administered Medications:     acetaminophen (TYLENOL) tablet 650 mg, 650 mg, Oral, Q6H PRN, Sadie Sung MD    acetaZOLAMIDE (DIAMOX) injection 250 mg, 250 mg, Intravenous, Once, Zeina Lemon DO    aluminum-magnesium hydroxide-simethicone (MAALOX) oral suspension 30 mL, 30 mL, Oral, Q6H PRN, Sadie Sung MD    azithromycin (ZITHROMAX) 500 mg in sodium chloride 0.9 % 250 mL IVPB, 500 mg, Intravenous, Q24H, Zeina Lemon DO, Stopped at 03/17/25 0000    benzonatate (TESSALON PERLES) capsule 200 mg, 200 mg, Oral, TID, Sadie Sung MD, 200 mg at 03/16/25 2101    budesonide (PULMICORT) inhalation solution 0.5 mg, 0.5 mg, Nebulization, Q12H, Sadie Sung MD, 0.5 mg at 03/17/25 0753    ceftriaxone (ROCEPHIN) 1 g/50 mL in dextrose IVPB, 1,000 mg, Intravenous, Q24H, Yasmin Bennett MD, Stopped at 03/16/25 1100    chlorhexidine (PERIDEX) 0.12 % oral rinse 15 mL, 15 mL, Mouth/Throat, Q12H GABE, Nguyễn Liu DO, 15 mL at 03/16/25 2101    dextromethorphan-guaiFENesin (ROBITUSSIN DM) oral syrup 10 mL, 10 mL, Oral, Q4H PRN, Sadie Sung MD    docusate sodium (COLACE) capsule 100 mg, 100 mg, Oral, BID, Sadie Sung MD    enoxaparin (LOVENOX) subcutaneous injection 40 mg, 40 mg, Subcutaneous, Daily, Sadie Sung MD, 40 mg at 03/15/25 0852    formoterol (PERFOROMIST) nebulizer solution 20 mcg, 20 mcg, Nebulization, Q12H, Yasmin Bennett MD, 20 mcg at 03/17/25 0755    [Held by provider] furosemide (LASIX) injection 40 mg, 40 mg, Intravenous, BID, Sadie Sung MD, 40 mg at 03/16/25 0842    ipratropium (ATROVENT) 0.02 % inhalation solution 0.5 mg, 0.5 mg, Nebulization, TID, Sadie Sung MD, 0.5 mg at 03/17/25 0758    levalbuterol (XOPENEX) inhalation solution 1.25 mg, 1.25 mg, Nebulization, TID, Sadie Sung MD, 1.25 mg at 03/17/25 0759     methylPREDNISolone sodium succinate (Solu-MEDROL) injection 40 mg, 40 mg, Intravenous, Q12H GABE, Yasmin Bennett MD, 40 mg at 03/16/25 2100    ondansetron (ZOFRAN) injection 4 mg, 4 mg, Intravenous, Q6H PRN, Sadie Sung MD    polyethylene glycol (MIRALAX) packet 17 g, 17 g, Oral, Daily, Sadie Sung MD      Labs & Results:        Results from last 7 days   Lab Units 03/17/25  0502 03/16/25  0509 03/15/25  0414   WBC Thousand/uL 4.17* 2.81* 4.48   HEMOGLOBIN g/dL 10.5* 10.3* 10.9*   HEMATOCRIT % 35.8* 34.8* 37.6   PLATELETS Thousands/uL 173 178 168         Results from last 7 days   Lab Units 03/17/25  0502 03/16/25  0509 03/15/25  0414   POTASSIUM mmol/L 3.7 3.9 4.4   CHLORIDE mmol/L 88* 90* 91*   CO2 mmol/L >45* >45* 44*   BUN mg/dL 44* 49* 36*   CREATININE mg/dL 0.68 0.85 0.75   CALCIUM mg/dL 8.5 8.2* 8.2*   ALK PHOS U/L 50 51 52   ALT U/L 59* 64* 88*   AST U/L 46* 63* 118*     Results from last 7 days   Lab Units 03/14/25  1139   INR  0.84*         Bren LIN

## 2025-03-17 NOTE — PROGRESS NOTES
Patient listed on Advanced Heart Failure Census at [facility]. AMB Social Work Order has been entered for the purpose of chart review and rounds with the team.     Outpatient Advanced Heart Failure LCSW completed electronic chart review. Visited pt in MICU; [place]. Pt with severe COPD,  LVEF 25%; NYHA III; ACC/AHA Stage C/D  and Cachexia.  Heart Failure Team discussed Palliative Consult with pt's spouse and she is aware of poor prognosis but would like to follow pt's wishes for life saving measures.    LCSW plans to close social work referral once pt departs from hospital setting if no outpatient social work needs are identified by that time.

## 2025-03-17 NOTE — PROGRESS NOTES
Progress Note - Critical Care/ICU   Name: Natalio Hartmann Jr. 69 y.o. male I MRN: [id number]  Unit/Bed#: MICU 13 I Date of Admission: 3/14/2025   Date of Service: 3/17/2025 I Hospital Day: 3       Assessment & Plan   Active Hospital Problems    Diagnosis Date Noted POA    Acute on chronic respiratory failure with hypoxia and hypercapnia (HCC) 11/19/2020 Yes    Severe protein-calorie malnutrition (HCC) 10/14/2023 Yes    Metabolic encephalopathy 07/20/2023 Yes    Malnutrition (HCC) 06/10/2023 Yes    Hyperlipidemia 05/02/2023 Yes    COPD exacerbation (HCC) 04/28/2023 Yes    SIRS (systemic inflammatory response syndrome) 02/17/2023 Yes    Acute on chronic systolic congestive heart failure (HCC) 09/12/2022 Yes    Goals of care, counseling/discussion 02/25/2021 Not Applicable    Obstructive sleep apnea 07/29/2013 Yes      Resolved Hospital Problems   No resolved problems to display.       Neuro:   Diagnosis: Acute metabolic encephalopathy  Most likely 2/2 hypoxic hypercarbia   Improving  Plan: CAM-ICU  Delirium precuations  Avoid neurotoxin  On bipap           CV:   Diagnosis: Acute on chronic HEFrEF  Nonischemic cardiomyopathy  BNP elevated 1079  Echo: EF 25% systolic function severely reduced severe global hypokinesis LA moderately dilated mild TR, no aortic stenosis, trace MR, mild TR normal RVSP, no pericardial effusion   Home diuretic regimen: Torsemide 20 mg alternating with 40 mg QOD  Outpatient cardiology notes review recommendation for palliative care   Plan:   IV Lasix 40 mg BID  Monitor U/O: - 1L  Monitor daily weights  Monitor BMP and electrolytes   Cardiology reccs apreciated     Pulm:  Diagnosis: Acute on chronic respiratory failure with hypoxia and hypercarbia  Multifactorial with COPD and CHF exacerbation  Bipap required in the ED  VBG in ED: 7.1/ 146  NC at home baseline 4L   Pro-Jorge Alberto negative BNP pending.   X-ray reviewed cardiomegaly, pulmonary congestion noted  Plan:   Continue supplemental oxygen goal  oxygen >88%  Bipap at night and as needed during day  Encourage incentive spirometry  Diuretics for CHF exacerbation and respiratory treatment for COPD  S/p Diamox 2 doses  IV solumderol and azithromycin        COPD exacerbation:  Vbg 7.1/146  Required Bipap in the ED   Plan:   Continue supplemental oxygen goal oxygen >88%  Bipap at night and as needed during day  Encourage incentive spirometry  respiratory treatment for COPD  IV solumderol and azithromycin        KEVIN:  On trilogy NIV at night  Bipap at night      GI:   Diagnosis: Severe protein-calorie malnutrition  Plan: Consult nutrition      Diagnosis: Elevated transaminitis most likely secondary to congestive hepatopathy  Improving  Plan: Monitor CMP     :   No active issues     F/E/N:   F: None  E: Replete electrolytes as needed   N: rCLD      Heme/Onc:   Diagnosis: Macrocytic anemia, chronic  Baseline around 9ish    Most likely nutritional deficiency  Plan: Monitor Hgb and transfuse <7  Continue DVT ppx with Lovenox 40 mg sq.     Endo:   No active issues     ID:   Diagnosis: SIRS Criteria  Met SIRS criteria upon admission   Sepsis workup done admission  Procal 0.4  B cx: negative at 48 hours  S/p Empiric treatment with ceftraixone for PNA  Plan:   Continue CTX for PNA  Follow-up on cultures     MSK/Skin:   No active issues  Disposition: Med Surg with Telemetry    ICU Core Measures     A: Assess, Prevent, and Manage Pain Has pain been assessed? NA  Need for changes to pain regimen? NA   B: Both SAT/SAT  N/A   C: Choice of Sedation RASS Goal: 0 Alert and Calm or N/A patient not on sedation  Need for changes to sedation or analgesia regimen? NA   D: Delirium CAM-ICU: Negative   E: Early Mobility  Plan for early mobility? Yes   F: Family Engagement Plan for family engagement today? Yes       Antibiotic Review: Patient on appropriate coverage based on culture data.       Prophylaxis:  VTE VTE covered by:  enoxaparin, Subcutaneous, 40 mg at 03/15/25  0852       Stress Ulcer  not ordered         24 Hour Events : None  Subjective       Objective :                   Vitals I/O      Most Recent Min/Max in 24hrs   Temp (!) 97.4 °F (36.3 °C) Temp  Min: 97.2 °F (36.2 °C)  Max: 98.2 °F (36.8 °C)   Pulse 58 Pulse  Min: 56  Max: 101   Resp 18 Resp  Min: 18  Max: 43   /59 BP  Min: 98/59  Max: 122/59   O2 Sat 99 % SpO2  Min: 97 %  Max: 100 %      Intake/Output Summary (Last 24 hours) at 3/17/2025 0725  Last data filed at 3/17/2025 0000  Gross per 24 hour   Intake 1130 ml   Output 925 ml   Net 205 ml       Diet Clear Liquid    Invasive Monitoring           Physical Exam   Physical Exam  Vitals reviewed.   Eyes:      Pupils: Pupils are equal, round, and reactive to light.   Skin:     General: Skin is warm.   Cardiovascular:      Rate and Rhythm: Normal rate and regular rhythm.      Pulses: Normal pulses.   Musculoskeletal:      Right lower leg: No edema.      Left lower leg: No edema.   Abdominal:      Palpations: Abdomen is soft.   Constitutional:       Comments: cachectic   Pulmonary:      Effort: Pulmonary effort is normal.      Breath sounds: Normal breath sounds. No wheezing or rales.      Comments: Barrel chest  Neurological:      Mental Status: He is oriented to person, place and time.          Diagnostic Studies        Lab Results: I have reviewed the following results:     Medications:  Scheduled PRN   acetaZOLAMIDE, 250 mg, Once  azithromycin, 500 mg, Q24H  benzonatate, 200 mg, TID  budesonide, 0.5 mg, Q12H  cefTRIAXone, 1,000 mg, Q24H  chlorhexidine, 15 mL, Q12H GABE  docusate sodium, 100 mg, BID  enoxaparin, 40 mg, Daily  formoterol, 20 mcg, Q12H  [Held by provider] furosemide, 40 mg, BID  ipratropium, 0.5 mg, TID  levalbuterol, 1.25 mg, TID  methylPREDNISolone sodium succinate, 40 mg, Q12H GABE  polyethylene glycol, 17 g, Daily      acetaminophen, 650 mg, Q6H PRN  aluminum-magnesium hydroxide-simethicone, 30 mL, Q6H PRN  dextromethorphan-guaiFENesin, 10 mL,  Q4H PRN  ondansetron, 4 mg, Q6H PRN       Continuous          Labs:   CBC    Recent Labs     03/16/25  0509 03/17/25  0502   WBC 2.81* 4.17*   HGB 10.3* 10.5*   HCT 34.8* 35.8*    173     BMP    Recent Labs     03/16/25  0509 03/17/25  0502   SODIUM 144 141   K 3.9 3.7   CL 90* 88*   CO2 >45* >45*   BUN 49* 44*   CREATININE 0.85 0.68   CALCIUM 8.2* 8.5       Coags    No recent results     Additional Electrolytes  Recent Labs     03/16/25  0509 03/17/25  0502   MG 2.2 2.3   PHOS 3.7 3.4          Blood Gas    No recent results  Recent Labs     03/17/25  0502   PHVEN 7.366   [id number] 81.9*   PO2VEN 57.0*   [id number] 45.9*   BEVEN 16.9   [id number] 85.6*    LFTs  Recent Labs     03/16/25  0509 03/17/25  0502   ALT 64* 59*   AST 63* 46*   ALKPHOS 51 50   ALB 3.4* 3.8   TBILI 0.46 0.48       Infectious  No recent results  Glucose  Recent Labs     03/16/25  0509 03/17/25  0502   GLUC 113 144*

## 2025-03-17 NOTE — CASE MANAGEMENT
Case Management Discharge Planning Note    Patient name Natalio Hartmann Jr.  Location [facility]U 13/[facility]U 13 MRN [id number]  : 1955 Date 3/17/2025       Current Admission Date: 3/14/2025  Current Admission Diagnosis:Acute on chronic respiratory failure with hypoxia and hypercapnia ([facility])   Patient Active Problem List    Diagnosis Date Noted Date Diagnosed    Iron deficiency anemia secondary to inadequate dietary iron intake 2024     SIADH (syndrome of inappropriate ADH production) ([facility]) 2024     Type 2 diabetes mellitus without complication, with long-term current use of insulin ([facility]) 2024     End stage COPD ([facility]) 2024     Dependence on respirator (ventilator) status ([facility]) 01/10/2024     Severe protein-calorie malnutrition ([facility]) 10/14/2023     History of bladder cancer 2023     Pulmonary cachexia due to COPD ([facility])      Chronic hypercapnia/KEVIN 2023     Metabolic encephalopathy 2023     Palliative care patient 2023     Alkalosis 2023     Malnutrition ([facility]) 06/10/2023     Hyperlipidemia 2023     COPD exacerbation ([facility]) 2023     Steroid-induced hyperglycemia 2023     Physical deconditioning 2023     Chronic anemia 2023     SIRS (systemic inflammatory response syndrome) 2023     Hyponatremia 2023     Acute on chronic systolic congestive heart failure ([facility]) 2022     Pulmonary nodules/lesions, multiple 2022     Prostate cancer ([facility]) 2021     BPH with obstruction/lower urinary tract symptoms 2021     Goals of care, counseling/discussion 2021     Acute on chronic respiratory failure with hypoxia and hypercapnia ([facility]) 2020     Macrocytosis without anemia 2019     Other insomnia 2019     GERD (gastroesophageal reflux disease) 2017     Nonobstructive atherosclerosis of coronary artery 2016     Mood disorder ([facility]) 2015     Prediabetes 2015     Osteoporosis  10/14/2014     Vitamin D deficiency 10/14/2014     Nonischemic cardiomyopathy (HCC) 09/26/2014     Left bundle-branch block 08/03/2013     Osteoarthritis 08/03/2013     Obstructive sleep apnea 07/29/2013       LOS (days): 3  Geometric Mean LOS (GMLOS) (days):   Days to GMLOS:     OBJECTIVE:  Risk of Unplanned Readmission Score: 21.71         Current admission status: Inpatient   Preferred Pharmacy:   [id number] - [place], [place] - [address]  [place] [address]  Phone: [phone] Fax: [phone]    Primary Care Provider: Fatmata Gustafson DO    Primary Insurance: BLUE CROSS  Secondary Insurance: MEDICARE    DISCHARGE DETAILS:                                                                                                 Additional Comments: Case reviewed, pt is now on O2@4L nc, adm SOB, COPD, CHF. On Diamox IV, Ceftriaxone IV, Zithromax IV, Solumedrol IV. Afebrile now. PT/OT pending.  IS on O2@4L at home also. PT/OT pending for discharge needs.

## 2025-03-17 NOTE — UTILIZATION REVIEW
Initial Clinical Review    Admission: Date/Time/Statement:   Admission Orders (From admission, onward)       Ordered        03/14/25 1417  INPATIENT ADMISSION  Once                          Orders Placed This Encounter   Procedures    INPATIENT ADMISSION     Standing Status:   Standing     Number of Occurrences:   1     Level of Care:   Level 2 Stepdown / HOT [14]     Estimated length of stay:   More than 2 Midnights     Certification:   I certify that inpatient services are medically necessary for this patient for a duration of greater than two midnights. See H&P and MD Progress Notes for additional information about the patient's course of treatment.     ED Arrival Information       Expected   -    Arrival   3/14/2025 10:00    Acuity   Emergent              Means of arrival   Ambulance    Escorted by   [facility]    Service   Critical Care/ICU    Admission type   Emergency              Arrival complaint   Respiratory distress             Chief Complaint   Patient presents with    Respiratory Distress     COPD hx. From home with Resp. Distress starting this AM. EMS initial room air sat of 87% with increased WOB. GCS 14 upon arrival to ED.        Initial Presentation: 69 y.o. male  with a PMH of severe COPD, chronic hypoxic and hypercapnic respiratory failure on baseline 4L NC, cachexia, nonischemic cardiomyopathy, chronic systolic CHF presented to the ED from home via EMS w/ worsening SOB.  Pt reports feeling increasingly lethargic feeling tired and fatigued since yesterday. No relief w/ home txs. Noted by wife w/ worse SOB, slumped in the chair and took him to the ED.   In the ED, he was noted to be somnolent, hypoxic and hypercapnic requiring BiPAP   On exam, tachypneic, JVD elevated, crackles, Lungs emphysematous, Diminished breath sounds, b/l LE edema present.  Given nebs and IV Solu Medrol by EMS. Given nebs, IV Rocephin, IV Acetaminophen, IV Azithromycin in the ED.    Admit as Inpatient for  evaluation and treatment of acute on chronic resp failure w/ hypoxia and hypercapnia, acute on chronic systolic CHF, COPD exacerbation, SIRS, metabolic encephalopathy.  Plan: BiPAP as needed and at night. Continue supplemental O2 to keep sats >88%. IV Lasix. Low salt diet. Cardiology and pulmonology consulted. IV solu medrol. Azithromycin. Cont resp txs. F/u cxs. Mon off abx. Mon WBC, temps, procal. MOn MS closely.    Anticipated Length of Stay/Certification Statement: Patient will be admitted on an inpatient basis with an anticipated length of stay of greater than 2 midnights secondary to acute on chronic hypoxic hypercapnic respiratory failure, acute on chronic CHF, COPD exacerbation for management as outlined.     Pulmonology Consult: acute on chronic resp failure w/ hypoxia and hypercapnia:  Pt found to be hypotensive, lethargic, hypercapnic 7.19/141.7 for which BiPAP placed 20/6 (baseline 7.3/86.8).  X-ray showed cardiomegaly, pulmonary congestion noted.   On exam, down to 5 L via NC, crackles noted to bases JVD present, hepatomegaly, 2+ pedal edema. Presentation likely in setting of CHF exacerbation, with component of COPD.   Plan: cont diuresis for goal net neg, as BP tolerates. Obtain   repeat ABG and continue with BiPAP as needed and with naps and at night.Follow-up BMP. Cardiology consulted  Daily weights strict I/O monitoring. Recommend palliative care consult. Continue with Solu-Medrol for today and will switch to prednisone tomorrow. Cont nebulized bronchodilators budesonide, formoterol     Date: 03/15   Day 2: Pt has remained on BiPAP for past 24 hours. BNP 1079.  Hypercapnia improving 7.3/79.1/131.5/45.1 this morning however unable to wean patient off BiPAP. Will transfer to MICU for close monitoring. Cont BIPAP for now, currently on 20/6 w/ TV in the mid 300s. Titrate pulse Ox >88%. Continue with diuresis  for goal net negative, as BP tolerates. Continue on ceftriaxone azithromycin. Cardiology  consulted. Mon Daily wts, I/Os. Cont IV Solu Medrol. Cont budesonide, formoterol.       ED Treatment-Medication Administration from 03/14/2025 1000 to 03/15/2025 1006         Date/Time Order Dose Route Action     03/14/2025 1023 methylPREDNISolone sodium succinate (FOR EMS ONLY) (Solu-MEDROL) 125 MG injection 125 mg 0 mg Does not apply Given to EMS     03/14/2025 1023 albuterol (FOR EMS ONLY) (2.5 mg/3 mL) 0.083 % inhalation solution 5 mg 0 mg Does not apply Given to EMS     03/14/2025 1023 ipratropium-albuterol (FOR EMS ONLY) (DUO-NEB) 0.5-2.5 mg/3 mL inhalation solution 3 mL 0 mL Does not apply Given to EMS     03/14/2025 1023 albuterol inhalation solution 10 mg 10 mg Nebulization Given by Other     03/14/2025 1023 ipratropium (ATROVENT) 0.02 % inhalation solution 1 mg 1 mg Nebulization Given by Other     03/14/2025 1022 sodium chloride 0.9 % inhalation solution 12 mL 12 mL Nebulization Given by Other     03/14/2025 1130 ceftriaxone (ROCEPHIN) 1 g/50 mL in dextrose IVPB 1,000 mg Intravenous New Bag     03/14/2025 1212 acetaminophen (Ofirmev) injection 1,000 mg 1,000 mg Intravenous New Bag     03/14/2025 1217 azithromycin (ZITHROMAX) 500 mg in sodium chloride 0.9% 250mL IVPB 500 mg 500 mg Intravenous New Bag     03/14/2025 2144 budesonide (PULMICORT) inhalation solution 0.5 mg 0.5 mg Nebulization Given     03/15/2025 0906 budesonide (PULMICORT) inhalation solution 0.5 mg 0.5 mg Nebulization Given     03/15/2025 0852 enoxaparin (LOVENOX) subcutaneous injection 40 mg 40 mg Subcutaneous Given     03/14/2025 2145 levalbuterol (XOPENEX) inhalation solution 1.25 mg 1.25 mg Nebulization Given     03/15/2025 0906 levalbuterol (XOPENEX) inhalation solution 1.25 mg 1.25 mg Nebulization Given     03/15/2025 0849 furosemide (LASIX) injection 40 mg 40 mg Intravenous Given     03/14/2025 2145 ipratropium (ATROVENT) 0.02 % inhalation solution 0.5 mg 0.5 mg Nebulization Given     03/15/2025 0906 ipratropium (ATROVENT) 0.02 %  inhalation solution 0.5 mg 0.5 mg Nebulization Given     03/14/2025 2145 formoterol (PERFOROMIST) nebulizer solution 20 mcg 20 mcg Nebulization Given     03/15/2025 0907 formoterol (PERFOROMIST) nebulizer solution 20 mcg 20 mcg Nebulization Given     03/15/2025 0846 methylPREDNISolone sodium succinate (Solu-MEDROL) injection 40 mg 40 mg Intravenous Given     03/15/2025 0843 ceftriaxone (ROCEPHIN) 1 g/50 mL in dextrose IVPB 1,000 mg Intravenous New Bag            Scheduled Medications:  azithromycin, 500 mg, Intravenous, Q24H  benzonatate, 200 mg, Oral, TID  budesonide, 0.5 mg, Nebulization, Q12H  cefTRIAXone, 1,000 mg, Intravenous, Q24H  chlorhexidine, 15 mL, Mouth/Throat, Q12H GABE  docusate sodium, 100 mg, Oral, BID  enoxaparin, 40 mg, Subcutaneous, Daily  formoterol, 20 mcg, Nebulization, Q12H  [Held by provider] furosemide, 40 mg, Intravenous, BID  ipratropium, 0.5 mg, Nebulization, TID  levalbuterol, 1.25 mg, Nebulization, TID  methylPREDNISolone sodium succinate, 40 mg, Intravenous, Q12H GABE  polyethylene glycol, 17 g, Oral, Daily      Continuous IV Infusions: none     PRN Meds:  acetaminophen, 650 mg, Oral, Q6H PRN  aluminum-magnesium hydroxide-simethicone, 30 mL, Oral, Q6H PRN  dextromethorphan-guaiFENesin, 10 mL, Oral, Q4H PRN  ondansetron, 4 mg, Intravenous, Q6H PRN      ED Triage Vitals   Temperature Pulse Respirations Blood Pressure SpO2 Pain Score   03/14/25 1022 03/14/25 1013 03/14/25 1013 03/14/25 1013 03/14/25 1013 03/15/25 0600   (!) 101 °F (38.3 °C) (!) 113 (!) 30 152/75 100 % No Pain     Weight (last 2 days)       Date/Time Weight    03/17/25 0600 46 (101.41)    03/16/25 1128 46.3 (102)    03/16/25 0500 46.5 (102.51)            Vital Signs (last 3 days)       Date/Time Temp Pulse Resp BP MAP (mmHg) SpO2 FiO2 (%) Calculated FIO2 (%) - Nasal Cannula O2 Flow Rate (L/min) Nasal Cannula O2 Flow Rate (L/min) O2 Device O2 Interface Device Patient Position - Orthostatic VS Tessy Coma Scale Score Pain     03/17/25 0755 -- -- -- -- -- 99 % -- -- -- -- -- -- -- -- --    03/17/25 0600 -- 63 22 99/59 74 98 % -- -- -- -- -- -- -- -- --    03/17/25 0505 -- -- -- -- -- -- -- -- -- -- -- Face mask -- -- --    03/17/25 0500 -- 67 24 106/67 81 98 % -- -- -- -- -- -- -- -- --    03/17/25 0400 97.4 °F (36.3 °C) 58 18 103/58 74 98 % -- -- -- -- -- -- -- -- --    03/17/25 0300 -- 59 19 100/62 76 99 % -- -- -- -- -- -- -- -- --    03/17/25 0200 -- 58 18 106/59 75 99 % -- -- -- -- -- -- -- -- --    03/17/25 0100 -- 57 19 98/59 74 99 % -- -- -- -- -- -- -- -- --    03/17/25 0000 97.4 °F (36.3 °C) 63 23 107/64 81 100 % -- -- -- -- BiPAP -- -- 14 No Pain    03/16/25 2300 -- 58 20 110/64 80 99 % -- -- -- -- -- -- -- -- --    03/16/25 2200 -- 56 18 110/57 77 99 % -- -- -- -- -- -- -- -- --    03/16/25 2100 -- 78 32 107/61 79 99 % -- -- -- -- -- -- -- -- --    03/16/25 2000 98.2 °F (36.8 °C) 75 21 100/61 76 100 % -- 44 -- 6 L/min Nasal cannula -- -- 14 No Pain    03/16/25 1800 -- 73 21 102/61 77 99 % -- -- -- -- -- -- -- -- --    03/16/25 1700 -- 89 31 108/62 78 98 % -- -- -- -- -- -- -- -- --    03/16/25 1600 97.2 °F (36.2 °C) 94 43 117/76 92 98 % -- -- -- -- -- -- -- 13 No Pain    03/16/25 1500 -- 76 21 103/56 74 99 % -- -- -- -- -- -- -- -- --    03/16/25 1400 -- 82 39 109/63 81 100 % -- -- -- -- -- -- -- -- --    03/16/25 1351 -- -- -- -- -- 99 % -- -- 6 L/min -- Nasal cannula -- -- -- --    03/16/25 1300 -- 63 27 122/59 82 98 % -- -- 6 L/min -- Nasal cannula -- -- -- --    03/16/25 1200 -- 61 20 108/60 77 98 % -- -- -- -- -- -- -- 13 No Pain    03/16/25 1128 -- 60 -- 98/56 -- -- -- -- -- -- -- -- -- -- --    03/16/25 1100 -- 68 24 106/64 80 97 % -- -- -- -- -- -- -- -- --    03/16/25 1000 -- 82 27 113/67 85 98 % -- -- -- -- -- -- -- -- --    03/16/25 0945 -- -- -- -- -- -- -- -- -- -- BiPAP -- -- -- --    03/16/25 0900 -- 101 37 106/70 84 98 % -- 44 -- 6 L/min Nasal cannula -- -- -- --    03/16/25 0800 97.3 °F (36.3 °C) 70 26 109/58  80 98 % -- -- -- -- -- -- -- 13 No Pain    03/16/25 0725 -- -- -- -- -- 97 % -- -- -- -- -- Face mask -- -- --    03/16/25 0700 -- 60 17 99/54 70 98 % -- -- -- -- -- -- -- -- --    03/16/25 0600 -- 60 20 98/56 69 98 % -- -- -- -- -- -- -- -- --    03/16/25 0500 -- 61 19 99/59 75 99 % -- -- -- -- -- -- -- -- --    03/16/25 0400 97.5 °F (36.4 °C) 61 24 115/59 81 98 % -- -- -- -- -- -- -- 13 No Pain    03/16/25 0328 -- -- -- -- -- -- -- -- -- -- -- Face mask -- -- --    03/16/25 0300 -- 61 17 115/63 84 99 % -- -- -- -- -- -- -- -- --    03/16/25 0200 -- 64 20 104/58 76 98 % -- -- -- -- -- -- -- -- --    03/16/25 0100 -- 71 17 100/60 75 99 % -- -- -- -- -- -- -- -- --    03/16/25 0000 97.9 °F (36.6 °C) 67 16 106/62 79 99 % -- -- -- -- -- -- -- 13 --    03/15/25 2300 -- 73 17 102/63 77 98 % -- -- -- -- -- -- -- -- --    03/15/25 2200 -- 79 18 101/56 72 99 % -- -- -- -- -- -- -- -- --    03/15/25 2100 -- 88 22 110/76 88 99 % -- -- -- -- -- -- -- -- --    03/15/25 2021 -- -- -- -- -- -- -- -- -- -- -- Face mask -- -- --    03/15/25 2000 98.3 °F (36.8 °C) 73 17 106/65 80 100 % 40 -- -- -- BiPAP -- Lying 13 No Pain    03/15/25 1900 -- 73 24 102/63 77 100 % -- -- -- -- -- -- -- -- --    03/15/25 1843 -- 76 24 -- -- 99 % -- -- -- -- -- -- -- -- --    03/15/25 1800 -- 78 38 98/61 74 100 % -- -- -- -- -- -- -- -- --    03/15/25 1700 -- 70 26 93/60 72 100 % -- -- -- -- -- -- -- -- --    03/15/25 1650 -- -- -- -- -- 99 % -- -- -- -- -- -- -- -- --    03/15/25 1600 98.2 °F (36.8 °C) 75 28 97/62 75 99 % -- -- -- -- -- -- -- 14 --    03/15/25 1543 -- -- -- -- -- 98 % -- -- -- -- -- Face mask -- -- --    03/15/25 1541 -- 80 15 -- -- -- -- -- -- -- -- -- -- -- --    03/15/25 1522 -- -- -- 90/61 71 -- -- -- -- -- -- -- -- -- --    03/15/25 1500 -- 82 28 89/57 67 -- -- -- -- -- -- -- -- -- --    03/15/25 1406 -- -- -- -- -- 97 % -- -- -- -- -- -- -- -- --    03/15/25 1400 -- 81 27 108/61 80 97 % -- -- -- -- -- -- -- -- --    03/15/25  1331 -- -- -- -- -- 98 % -- -- -- -- -- Face mask -- -- --    03/15/25 1300 -- 81 27 99/61 75 98 % -- -- -- -- -- -- -- -- --    03/15/25 1246 -- -- -- -- -- 98 % -- -- -- -- -- Face mask -- -- --    03/15/25 1200 -- 78 27 98/62 75 99 % -- -- -- -- -- -- -- 14 No Pain    03/15/25 1100 -- 76 23 109/71 85 99 % -- -- -- -- -- -- -- 14 --    03/15/25 1030 98.5 °F (36.9 °C) 85 24 103/62 78 97 % -- -- -- -- -- -- -- -- --    03/15/25 1000 -- -- -- -- -- -- -- -- -- -- -- Face mask -- -- --    03/15/25 0913 -- -- -- -- -- -- -- -- -- -- -- Face mask -- -- --    03/15/25 0900 -- 102 31 127/78 93 90 % -- -- -- -- BiPAP -- Lying -- --    03/15/25 0846 96.5 °F (35.8 °C) -- -- -- -- -- -- -- -- -- -- -- -- -- --    03/15/25 0838 -- -- -- -- -- 91 % -- -- -- -- BiPAP -- -- -- --    03/15/25 0830 -- 108 34 -- -- 78 % -- 40 -- 5 L/min Nasal cannula -- -- -- --    03/15/25 0800 -- 88 27 110/67 81 91 % -- -- -- -- BiPAP -- Lying -- --    03/15/25 0700 -- 96 33 102/68 80 93 % -- -- -- -- BiPAP -- Lying -- --    03/15/25 0600 -- 84 22 100/63 78 95 % -- -- -- -- BiPAP -- -- -- No Pain    03/15/25 0400 -- -- -- -- -- -- -- -- -- -- -- Face mask -- -- --    03/15/25 0300 -- 86 25 103/67 79 100 % -- -- -- -- BiPAP -- Lying -- --    03/15/25 0200 -- 88 32 103/70 83 99 % -- -- -- -- BiPAP -- Lying -- --    03/15/25 0000 -- 84 24 106/70 84 98 % -- -- -- -- BiPAP -- Lying -- --    03/14/25 2241 -- -- -- -- -- -- -- -- -- -- -- Face mask -- -- --    03/14/25 2200 -- 94 24 117/63 83 99 % -- -- -- -- BiPAP -- Lying -- --    03/14/25 2015 -- 90 30 110/61 81 96 % -- 40 -- 5 L/min EtCO2 nasal cannula -- Lying -- --    03/14/25 1915 -- 90 22 102/66 78 96 % -- 40 -- 5 L/min EtCO2 nasal cannula -- Lying -- --    03/14/25 1700 -- 80 22 103/63 79 99 % -- -- -- -- -- -- Lying -- --    03/14/25 1530 -- 86 24 99/59 74 98 % -- -- -- -- -- -- Lying -- --    03/14/25 1400 -- 92 32 103/66 80 96 % -- -- -- -- EtCO2 nasal cannula -- Lying -- --    03/14/25 1345  -- 86 25 87/57 67 96 % -- 40 -- 5 L/min EtCO2 nasal cannula -- -- -- --    03/14/25 1340 -- -- -- -- -- -- -- 40 -- 5 L/min Nasal cannula -- -- -- --    03/14/25 1330 -- 84 14 86/57 67 100 % -- -- -- -- BiPAP -- Lying -- --    03/14/25 1300 -- 84 14 85/56  66 100 % -- -- -- -- -- -- -- -- --    03/14/25 1200 -- 94 23 95/56 69 100 % -- -- -- -- BiPAP -- Sitting -- --    03/14/25 1115 -- 86 14 90/55 68 100 % -- -- -- -- BiPAP -- Lying -- --    03/14/25 1040 -- -- -- -- -- -- -- -- -- -- -- Face mask -- -- --    03/14/25 1030 -- 108 24 108/67 82 100 % -- -- -- -- None (Room air) -- -- -- --    03/14/25 1022 101 °F (38.3 °C) -- -- -- -- -- -- -- -- -- -- -- -- -- --    03/14/25 1017 -- -- -- -- -- -- -- -- -- -- -- -- -- 13 --    03/14/25 1016 -- -- -- -- -- -- -- -- -- -- BiPAP -- -- -- --    03/14/25 1013 -- 113 30 152/75 -- 100 % -- -- -- -- BiPAP -- -- -- --              Pertinent Labs/Diagnostic Test Results:   Radiology:  XR chest portable   Final Interpretation by Suhas Graham MD (03/14 1320)      Cardiomegaly and prominent vasculature, possibly related to positioning though pulmonary vascular congestion is not excluded. Clinical correlation for congestive heart failure recommended.            Workstation performed: [id number]           Cardiology:  Echo complete w/ contrast if indicated   Final Result by Lewis Macias MD (03/17 0737)        Left Ventricle: Left ventricular cavity size is severely dilated. Wall    thickness is normal. The left ventricular ejection fraction is 25%.    Systolic function is severely reduced. There is global hypokinesis with    regional variation. Diastolic function is mildly abnormal, consistent with    grade I (abnormal) relaxation.     IVS: There is abnormal septal motion consistent with left bundle branch    block.     Right Ventricle: Right ventricular cavity size is moderately dilated.    Systolic function is low normal.     Left Atrium: The atrium is severely  dilated.     Tricuspid Valve: There is mild regurgitation. The right ventricular    systolic pressure is mildly elevated. The estimated right ventricular    systolic pressure is 42.00 mmHg.         ECG 12 lead   Final Result by Jae Martines MD (03/15 0727)   Poor data quality, interpretation may be adversely affected   Possible Sinus rhythm with occasional Premature ectopic complexes   Incomplete left bundle branch block   Baseline Artifact   Abnormal ECG   Confirmed by Jae Martines ([id number]) on 3/15/2025 7:27:37 AM        GI:  No orders to display       Results from last 7 days   Lab Units 03/14/25 2344   SARS-COV-2  Negative     Results from last 7 days   Lab Units 03/17/25  0502 03/16/25  0509 03/15/25  0414 03/14/25  2348 03/14/25  1039   WBC Thousand/uL 4.17* 2.81* 4.48  --  7.83   HEMOGLOBIN g/dL 10.5* 10.3* 10.9*  --  11.9*   HEMATOCRIT % 35.8* 34.8* 37.6  --  40.6   PLATELETS Thousands/uL 173 178 168   < > 207   TOTAL NEUT ABS Thousands/µL 3.67 2.38 3.39  --  5.76    < > = values in this interval not displayed.         Results from last 7 days   Lab Units 03/17/25  0502 03/16/25  0509 03/15/25  0414 03/14/25  1039   SODIUM mmol/L 141 144 139 136   POTASSIUM mmol/L 3.7 3.9 4.4 4.6   CHLORIDE mmol/L 88* 90* 91* 84*   CO2 mmol/L >45* >45* 44* >45*   ANION GAP mmol/L  --   --  4  --    BUN mg/dL 44* 49* 36* 38*   CREATININE mg/dL 0.68 0.85 0.75 1.15   EGFR ml/min/1.73sq m 98 90 94 65   CALCIUM mg/dL 8.5 8.2* 8.2* 8.7   MAGNESIUM mg/dL 2.3 2.2 2.1  --    PHOSPHORUS mg/dL 3.4 3.7 4.7*  --      Results from last 7 days   Lab Units 03/17/25  0502 03/16/25  0509 03/15/25  0414 03/14/25  1039   AST U/L 46* 63* 118* 88*   ALT U/L 59* 64* 88* 57*   ALK PHOS U/L 50 51 52 60   TOTAL PROTEIN g/dL 5.9* 5.8* 5.9* 7.2   ALBUMIN g/dL 3.8 3.4* 3.6 4.2   TOTAL BILIRUBIN mg/dL 0.48 0.46 0.41 0.71         Results from last 7 days   Lab Units 03/17/25  0502 03/16/25  0509 03/15/25  0414 03/14/25  1039   GLUCOSE RANDOM mg/dL 144* 113  "101 132         Results from last 7 days   Lab Units 03/14/25  1039   HEMOGLOBIN A1C % 6.1*   EAG mg/dl 128     No results found for: \"BETA-HYDROXYBUTYRATE\"       Results from last 7 days   Lab Units 03/17/25  0502 03/16/25  0831 03/15/25  2005   PH JHONATHAN  7.366 7.400 7.334   PCO2 JHONATHAN mm Hg 81.9* 74.8* 95.0*   PO2 JHONATHAN mm Hg 57.0* 93.3* 53.7*   HCO3 JHONATHAN mmol/L 45.9* 45.3* 49.4*   BASE EXC JHONATHAN mmol/L 16.9 17.3 19.3   O2 CONTENT JHONATHAN ml/dL 14.1 15.0 13.7   O2 HGB, VENOUS % 85.6* 96.4* 83.8*                     Results from last 7 days   Lab Units 03/14/25  1139   PROTIME seconds 11.9*   INR  0.84*   PTT seconds 22*     Results from last 7 days   Lab Units 03/16/25  0509   TSH 3RD GENERATON uIU/mL 0.534     Results from last 7 days   Lab Units 03/15/25  0414 03/14/25  1039   PROCALCITONIN ng/ml 0.42* 0.21     Results from last 7 days   Lab Units 03/14/25  1039   LACTIC ACID mmol/L 1.4             Results from last 7 days   Lab Units 03/14/25  2348   BNP pg/mL 1,079*                                         Results from last 7 days   Lab Units 03/14/25  2344   INFLUENZA A PCR  Negative   INFLUENZA B PCR  Negative   RSV PCR  Negative                             Results from last 7 days   Lab Units 03/14/25  1039   BLOOD CULTURE  No Growth at 48 hrs.  No Growth at 48 hrs.                   Past Medical History:   Diagnosis Date    Acute metabolic encephalopathy 03/29/2022    Arthritis     Bladder mass     Cardiomyopathy (HCC)     Chest pain     COPD (chronic obstructive pulmonary disease) ([facility])     COVID-19 virus infection 02/23/2023    CPAP (continuous positive airway pressure) dependence     Emphysema of lung ([facility])     Hypoxia     nocturnal    Left bundle branch block     Multiple pulmonary nodules     last assessed: 10/12/16    Pneumonia     Sleep apnea, obstructive     Smoker     Weight loss 08/29/2019     Present on Admission:   COPD exacerbation (HCC)   Acute on chronic respiratory failure with hypoxia and hypercapnia " (HCC)   Hyperlipidemia   Metabolic encephalopathy   Malnutrition (HCC)   Obstructive sleep apnea   Severe protein-calorie malnutrition (HCC)   Acute on chronic systolic congestive heart failure (HCC)   SIRS (systemic inflammatory response syndrome)      Admitting Diagnosis: SOB (shortness of breath) [R06.02]  COPD exacerbation (HCC) [J44.1]  Acute on chronic systolic congestive heart failure (HCC) [I50.23]  Acute respiratory failure with hypoxia and hypercapnia (HCC) [J96.01, J96.02]  Age/Sex: 69 y.o. male    Network Utilization Review Department  ATTENTION: Please call with any questions or concerns to [phone] and carefully listen to the prompts so that you are directed to the right person. All voicemails are confidential.   For Discharge needs, contact Care Management DC Support Team at [phone] opt. [phone]  Send all requests for admission clinical reviews, approved or denied determinations and any other requests to dedicated fax number below belonging to the campus where the patient is receiving treatment. List of dedicated fax numbers for the Facilities:  FACILITY NAME UR FAX NUMBER   ADMISSION DENIALS (Administrative/Medical Necessity) [phone]   DISCHARGE SUPPORT TEAM (NETWORK) [phone]   PARENT CHILD HEALTH (Maternity/NICU/Pediatrics) [phone]   [facility] [phone]   [facility] [phone]   [facility] [phone]   [facility] [phone]   [facility] [phone]   [facility] [phone]   [facility] [phone]   [facility] [phone]   [facility] [phone]   [facility] [phone]   [facility] [phone]   [facility] [phone]

## 2025-03-18 LAB
ALBUMIN SERPL BCG-MCNC: 3.4 G/DL (ref 3.5–5)
ALP SERPL-CCNC: 44 U/L (ref 34–104)
ALT SERPL W P-5'-P-CCNC: 42 U/L (ref 7–52)
ANION GAP SERPL CALCULATED.3IONS-SCNC: 3 MMOL/L (ref 4–13)
AST SERPL W P-5'-P-CCNC: 28 U/L (ref 13–39)
BASE EX.OXY STD BLDV CALC-SCNC: 94.8 % (ref 60–80)
BASE EXCESS BLDV CALC-SCNC: 16.9 MMOL/L
BASOPHILS # BLD AUTO: 0 THOUSANDS/ÂΜL (ref 0–0.1)
BASOPHILS NFR BLD AUTO: 0 % (ref 0–1)
BILIRUB SERPL-MCNC: 0.42 MG/DL (ref 0.2–1)
BUN SERPL-MCNC: 40 MG/DL (ref 5–25)
CALCIUM ALBUM COR SERPL-MCNC: 8.7 MG/DL (ref 8.3–10.1)
CALCIUM SERPL-MCNC: 8.2 MG/DL (ref 8.4–10.2)
CHLORIDE SERPL-SCNC: 89 MMOL/L (ref 96–108)
CO2 SERPL-SCNC: 45 MMOL/L (ref 21–32)
CREAT SERPL-MCNC: 0.68 MG/DL (ref 0.6–1.3)
EOSINOPHIL # BLD AUTO: 0 THOUSAND/ÂΜL (ref 0–0.61)
EOSINOPHIL NFR BLD AUTO: 0 % (ref 0–6)
ERYTHROCYTE [DISTWIDTH] IN BLOOD BY AUTOMATED COUNT: 13.3 % (ref 11.6–15.1)
GFR SERPL CREATININE-BSD FRML MDRD: 98 ML/MIN/1.73SQ M
GLUCOSE SERPL-MCNC: 153 MG/DL (ref 65–140)
HCO3 BLDV-SCNC: 46 MMOL/L (ref 24–30)
HCT VFR BLD AUTO: 37.1 % (ref 36.5–49.3)
HGB BLD-MCNC: 10.9 G/DL (ref 12–17)
IMM GRANULOCYTES # BLD AUTO: 0.01 THOUSAND/UL (ref 0–0.2)
IMM GRANULOCYTES NFR BLD AUTO: 0 % (ref 0–2)
IPAP: 16
LYMPHOCYTES # BLD AUTO: 0.2 THOUSANDS/ÂΜL (ref 0.6–4.47)
LYMPHOCYTES NFR BLD AUTO: 5 % (ref 14–44)
MAGNESIUM SERPL-MCNC: 2.1 MG/DL (ref 1.9–2.7)
MCH RBC QN AUTO: 30.4 PG (ref 26.8–34.3)
MCHC RBC AUTO-ENTMCNC: 29.4 G/DL (ref 31.4–37.4)
MCV RBC AUTO: 104 FL (ref 82–98)
MONOCYTES # BLD AUTO: 0.33 THOUSAND/ÂΜL (ref 0.17–1.22)
MONOCYTES NFR BLD AUTO: 8 % (ref 4–12)
NEUTROPHILS # BLD AUTO: 3.37 THOUSANDS/ÂΜL (ref 1.85–7.62)
NEUTS SEG NFR BLD AUTO: 87 % (ref 43–75)
NON VENT- BIPAP: ABNORMAL
NRBC BLD AUTO-RTO: 0 /100 WBCS
O2 CT BLDV-SCNC: 15.9 ML/DL
PCO2 BLDV: 82.8 MM HG (ref 42–50)
PEEP MAX SETTING VENT: 6 CM[H2O]
PH BLDV: 7.36 [PH] (ref 7.3–7.4)
PHOSPHATE SERPL-MCNC: 3 MG/DL (ref 2.3–4.1)
PLATELET # BLD AUTO: 160 THOUSANDS/UL (ref 149–390)
PMV BLD AUTO: 9.8 FL (ref 8.9–12.7)
PO2 BLDV: 79.1 MM HG (ref 35–45)
POTASSIUM SERPL-SCNC: 3.3 MMOL/L (ref 3.5–5.3)
PROT SERPL-MCNC: 5.7 G/DL (ref 6.4–8.4)
RBC # BLD AUTO: 3.58 MILLION/UL (ref 3.88–5.62)
SODIUM SERPL-SCNC: 137 MMOL/L (ref 135–147)
VENT BIPAP FIO2: 40 %
WBC # BLD AUTO: 3.91 THOUSAND/UL (ref 4.31–10.16)

## 2025-03-18 PROCEDURE — 82805 BLOOD GASES W/O2 SATURATION: CPT

## 2025-03-18 PROCEDURE — 94640 AIRWAY INHALATION TREATMENT: CPT

## 2025-03-18 PROCEDURE — 94760 N-INVAS EAR/PLS OXIMETRY 1: CPT

## 2025-03-18 PROCEDURE — 92526 ORAL FUNCTION THERAPY: CPT

## 2025-03-18 PROCEDURE — 99232 SBSQ HOSP IP/OBS MODERATE 35: CPT | Performed by: INTERNAL MEDICINE

## 2025-03-18 PROCEDURE — 80053 COMPREHEN METABOLIC PANEL: CPT

## 2025-03-18 PROCEDURE — 83735 ASSAY OF MAGNESIUM: CPT

## 2025-03-18 PROCEDURE — 94660 CPAP INITIATION&MGMT: CPT

## 2025-03-18 PROCEDURE — 94664 DEMO&/EVAL PT USE INHALER: CPT

## 2025-03-18 PROCEDURE — 84100 ASSAY OF PHOSPHORUS: CPT

## 2025-03-18 PROCEDURE — 99291 CRITICAL CARE FIRST HOUR: CPT | Performed by: INTERNAL MEDICINE

## 2025-03-18 PROCEDURE — 85025 COMPLETE CBC W/AUTO DIFF WBC: CPT

## 2025-03-18 RX ORDER — FUROSEMIDE 10 MG/ML
80 INJECTION INTRAMUSCULAR; INTRAVENOUS 2 TIMES DAILY
Status: DISCONTINUED | OUTPATIENT
Start: 2025-03-18 | End: 2025-03-19

## 2025-03-18 RX ORDER — PREDNISONE 20 MG/1
40 TABLET ORAL DAILY
Status: DISCONTINUED | OUTPATIENT
Start: 2025-03-19 | End: 2025-03-19

## 2025-03-18 RX ORDER — POTASSIUM CHLORIDE 14.9 MG/ML
20 INJECTION INTRAVENOUS
Status: COMPLETED | OUTPATIENT
Start: 2025-03-18 | End: 2025-03-18

## 2025-03-18 RX ADMIN — HEPARIN SODIUM 5000 UNITS: 5000 INJECTION INTRAVENOUS; SUBCUTANEOUS at 05:22

## 2025-03-18 RX ADMIN — CHLORHEXIDINE GLUCONATE 15 ML: 1.2 SOLUTION ORAL at 09:42

## 2025-03-18 RX ADMIN — BUDESONIDE 0.5 MG: 0.5 INHALANT RESPIRATORY (INHALATION) at 07:10

## 2025-03-18 RX ADMIN — METHYLPREDNISOLONE SODIUM SUCCINATE 40 MG: 40 INJECTION, POWDER, FOR SOLUTION INTRAMUSCULAR; INTRAVENOUS at 09:04

## 2025-03-18 RX ADMIN — FUROSEMIDE 40 MG: 10 INJECTION, SOLUTION INTRAVENOUS at 09:05

## 2025-03-18 RX ADMIN — FORMOTEROL FUMARATE DIHYDRATE 20 MCG: 20 SOLUTION RESPIRATORY (INHALATION) at 19:02

## 2025-03-18 RX ADMIN — DOCUSATE SODIUM 100 MG: 100 CAPSULE, LIQUID FILLED ORAL at 17:04

## 2025-03-18 RX ADMIN — FUROSEMIDE 80 MG: 10 INJECTION, SOLUTION INTRAMUSCULAR; INTRAVENOUS at 17:04

## 2025-03-18 RX ADMIN — LEVALBUTEROL HYDROCHLORIDE 1.25 MG: 1.25 SOLUTION RESPIRATORY (INHALATION) at 19:02

## 2025-03-18 RX ADMIN — CHLORHEXIDINE GLUCONATE 15 ML: 1.2 SOLUTION ORAL at 21:41

## 2025-03-18 RX ADMIN — LEVALBUTEROL HYDROCHLORIDE 1.25 MG: 1.25 SOLUTION RESPIRATORY (INHALATION) at 14:44

## 2025-03-18 RX ADMIN — POLYETHYLENE GLYCOL 3350 17 G: 17 POWDER, FOR SOLUTION ORAL at 09:52

## 2025-03-18 RX ADMIN — BENZONATATE 200 MG: 100 CAPSULE ORAL at 09:52

## 2025-03-18 RX ADMIN — LEVALBUTEROL HYDROCHLORIDE 1.25 MG: 1.25 SOLUTION RESPIRATORY (INHALATION) at 07:11

## 2025-03-18 RX ADMIN — POTASSIUM CHLORIDE 20 MEQ: 14.9 INJECTION, SOLUTION INTRAVENOUS at 11:08

## 2025-03-18 RX ADMIN — BENZONATATE 200 MG: 100 CAPSULE ORAL at 21:41

## 2025-03-18 RX ADMIN — BUDESONIDE 0.5 MG: 0.5 INHALANT RESPIRATORY (INHALATION) at 19:02

## 2025-03-18 RX ADMIN — IPRATROPIUM BROMIDE 0.5 MG: 0.5 SOLUTION RESPIRATORY (INHALATION) at 19:02

## 2025-03-18 RX ADMIN — HEPARIN SODIUM 5000 UNITS: 5000 INJECTION INTRAVENOUS; SUBCUTANEOUS at 14:28

## 2025-03-18 RX ADMIN — PRAVASTATIN SODIUM 80 MG: 80 TABLET ORAL at 17:04

## 2025-03-18 RX ADMIN — BENZONATATE 200 MG: 100 CAPSULE ORAL at 17:08

## 2025-03-18 RX ADMIN — FORMOTEROL FUMARATE DIHYDRATE 20 MCG: 20 SOLUTION RESPIRATORY (INHALATION) at 07:10

## 2025-03-18 RX ADMIN — IPRATROPIUM BROMIDE 0.5 MG: 0.5 SOLUTION RESPIRATORY (INHALATION) at 07:10

## 2025-03-18 RX ADMIN — IPRATROPIUM BROMIDE 0.5 MG: 0.5 SOLUTION RESPIRATORY (INHALATION) at 14:44

## 2025-03-18 RX ADMIN — CEFTRIAXONE SODIUM 1000 MG: 10 INJECTION, POWDER, FOR SOLUTION INTRAVENOUS at 09:00

## 2025-03-18 RX ADMIN — POTASSIUM CHLORIDE 20 MEQ: 14.9 INJECTION, SOLUTION INTRAVENOUS at 09:02

## 2025-03-18 RX ADMIN — ASPIRIN 81 MG CHEWABLE TABLET 81 MG: 81 TABLET CHEWABLE at 09:52

## 2025-03-18 RX ADMIN — HEPARIN SODIUM 5000 UNITS: 5000 INJECTION INTRAVENOUS; SUBCUTANEOUS at 21:41

## 2025-03-18 RX ADMIN — DOCUSATE SODIUM 100 MG: 100 CAPSULE, LIQUID FILLED ORAL at 09:52

## 2025-03-18 NOTE — SPEECH THERAPY NOTE
"Speech Language/Pathology  Speech-Language Pathology Progress Note      Patient Name: Natalio Hartmann Jr.    Today's Date: 3/18/2025      Subjective:  Pt was awake and alert. He was sitting upright in bed. Patient stated \"let's go keep feeding me faster\".    Objective:  Pt was seen today for dysphagia therapy. Current diet is dysphagia 3 dental soft with thin liquids. Pt was on mid flow O2. Oral care had already been completed. Focus of today's session was to maximize PO intake safety and improve pt's self monitoring. Textures offered today included soft solid, mech soft soft solid, and thin liquid via straw.  Some pieces of meat were larger.    Swallow function:   Pt is fed by his wife, he is hungry and wants to eat faster but tends to desat while he is eating.  Bolus retrieval and manipulation were  fair and quick . AP transfer was complete. Pharyngeal swallow initiation was present. Hyolaryngeal excursion was adequate. No overt coughing on any material.   The pt was given cues to slow down and not take so many bites in a row.  The pt stated the meat was too hard to get down.   Assessment:  The pt wants to eat rapidly but cannot breathe well if he goes too quickly.  No overt coughing or drop in O2.  Would benefit from a downgrade in diet to tolerate the solids.  Plan:  Change diet texture to dysphagia 2 mechanical soft. Continue ST follow up. Subsequent sessions to focus on improving pt's self monitoring and improving use of strategies to minimize risk of aspiration.  "

## 2025-03-18 NOTE — UTILIZATION REVIEW
Continued Stay Review    Date: 03/18                          Current Patient Class: Inpatient  Current Level of Care: Level 1 stepdown    HPI:69 y.o. male initially admitted on 03/14   Current Diagnosis:acute on chronic resp failure w/ hypoxia and hypercapnia, acute on chronic systolic CHF, COPD exacerbation    Assessment/Plan: VBG overnight 7.29/103. Pt on BIPAP overnight, placed on 5L NC this am. Cont BIPAP at night and prn. O2 sat goal >88%. continue IV diuresis for today. Mon UOP, goal 1L/24 hrs. Mon BMP/electrolytes. Cont IV Solu Medrol. Cont nebs.    Medications:   Scheduled Medications:  aspirin, 81 mg, Oral, Daily  benzonatate, 200 mg, Oral, TID  budesonide, 0.5 mg, Nebulization, Q12H  chlorhexidine, 15 mL, Mouth/Throat, Q12H GABE  docusate sodium, 100 mg, Oral, BID  formoterol, 20 mcg, Nebulization, Q12H  furosemide, 80 mg, Intravenous, BID  heparin (porcine), 5,000 Units, Subcutaneous, Q8H GABE  ipratropium, 0.5 mg, Nebulization, TID  levalbuterol, 1.25 mg, Nebulization, TID  methylPREDNISolone sodium succinate, 40 mg, Intravenous, Q12H GABE  polyethylene glycol, 17 g, Oral, Daily  pravastatin, 80 mg, Oral, Daily With Dinner      Continuous IV Infusions: none     PRN Meds:  acetaminophen, 650 mg, Oral, Q6H PRN  aluminum-magnesium hydroxide-simethicone, 30 mL, Oral, Q6H PRN  dextromethorphan-guaiFENesin, 10 mL, Oral, Q4H PRN  ondansetron, 4 mg, Intravenous, Q6H PRN      Discharge Plan: TBD    Vital Signs (last 3 days)       Date/Time Temp Pulse Resp BP MAP (mmHg) SpO2 FiO2 (%) Calculated FIO2 (%) - Nasal Cannula O2 Flow Rate (L/min) Nasal Cannula O2 Flow Rate (L/min) O2 Device O2 Interface Device Patient Position - Orthostatic VS Tessy Coma Scale Score Pain    03/18/25 1530 -- -- -- -- -- 98 % -- -- -- -- -- Face mask -- -- --    03/18/25 1528 -- -- -- -- -- -- 40 -- -- -- BiPAP -- -- -- --    03/18/25 1500 -- 96 25 115/70 87 99 % -- -- -- -- -- -- -- -- --    03/18/25 1446 -- -- -- -- -- 97 % -- 36 -- 4  L/min Nasal cannula -- -- -- --    03/18/25 1150 -- -- -- -- -- -- -- 40 -- 5 L/min Nasal cannula -- -- -- --    03/18/25 1100 -- 61 18 101/63 78 99 % -- -- -- -- -- -- -- -- --    03/18/25 0900 -- 61 23 97/60 74 98 % -- -- -- -- -- -- -- -- --    03/18/25 0800 97.8 °F (36.6 °C) 62 20 99/58 73 99 % -- -- -- -- BiPAP -- Lying -- --    03/18/25 0711 -- -- -- -- -- -- -- -- -- -- -- Face mask -- -- --    03/18/25 0600 -- 63 27 95/61 73 98 % -- -- -- -- -- -- -- -- --    03/18/25 0500 -- 62 20 99/62 76 98 % -- -- -- -- -- -- -- -- --    03/18/25 0411 -- -- -- -- -- -- -- -- -- -- -- -- -- 15 No Pain    03/18/25 0400 -- 64 21 98/62 76 99 % -- -- -- -- -- -- -- -- --    03/18/25 0300 -- 64 24 109/65 82 100 % -- -- -- -- -- -- -- -- --    03/18/25 0200 -- 62 28 100/65 78 100 % -- -- -- -- -- -- -- -- --    03/18/25 0146 -- -- -- -- -- -- -- -- -- -- -- Face mask -- -- --    03/18/25 0100 -- 62 20 102/59 75 100 % -- -- -- -- -- -- -- -- --    03/18/25 0015 -- -- -- -- -- -- -- -- -- -- -- -- -- 15 No Pain    03/18/25 0000 -- 61 21 99/61 75 99 % -- -- -- -- -- -- -- -- --    03/17/25 2300 -- 72 20 103/61 77 99 % -- -- -- -- -- -- -- -- --    03/17/25 2200 -- 62 19 102/61 75 99 % -- -- -- -- -- -- -- -- --    03/17/25 2100 -- 73 20 110/65 83 99 % -- -- -- -- BiPAP -- -- -- --    03/17/25 2020 -- -- -- -- -- -- -- -- -- -- -- Face mask -- -- --    03/17/25 2000 -- 95 28 104/64 79 97 % -- 36 -- 4 L/min Nasal cannula -- -- -- --    03/17/25 1946 98.6 °F (37 °C) -- -- -- -- -- -- -- -- -- -- -- -- -- --    03/17/25 1945 -- -- -- -- -- -- -- -- -- -- -- -- -- 15 No Pain    03/17/25 1800 -- 92 22 88/63 71 99 % -- -- -- -- -- -- -- -- --    03/17/25 1700 -- 76 18 93/58 70 98 % -- -- -- -- -- -- -- -- --    03/17/25 1600 97.7 °F (36.5 °C) 86 21 100/62 76 97 % -- 36 -- 4 L/min Nasal cannula -- Lying 15 No Pain    03/17/25 1500 -- 100 31 105/67 82 95 % -- -- -- -- -- -- -- -- --    03/17/25 1400 -- 102 26 128/68 90 100 % -- -- -- -- --  -- -- -- --    03/17/25 1347 -- -- -- -- -- 97 % -- -- -- -- -- -- -- -- --    03/17/25 1330 -- -- -- -- -- -- -- 36 -- 4 L/min Nasal cannula -- -- -- --    03/17/25 1300 -- 75 24 102/62 78 97 % -- -- -- -- -- -- -- -- --    03/17/25 1200 97.9 °F (36.6 °C) 94 26 119/71 90 98 % -- -- -- -- BiPAP -- Lying 15 No Pain    03/17/25 1130 -- -- -- -- -- 99 % -- -- -- -- BiPAP -- -- -- --    03/17/25 1100 -- 96 34 115/74 88 97 % -- -- -- -- -- -- -- -- --    03/17/25 1000 -- 95 24 109/70 85 97 % -- -- -- -- -- -- -- -- --    03/17/25 0900 -- 74 20 100/61 76 98 % -- -- -- -- -- -- -- -- --    03/17/25 0834 -- -- -- -- -- -- -- 36 4 L/min 4 L/min Nasal cannula Face mask -- -- --    03/17/25 0800 97.7 °F (36.5 °C) 60 23 104/67 81 98 % -- -- -- -- -- -- Lying 15 No Pain    03/17/25 0755 -- -- -- -- -- 99 % -- -- -- -- -- -- -- -- --    03/17/25 0600 -- 63 22 99/59 74 98 % -- -- -- -- -- -- -- -- --    03/17/25 0505 -- -- -- -- -- -- -- -- -- -- -- Face mask -- -- --    03/17/25 0500 -- 67 24 106/67 81 98 % -- -- -- -- -- -- -- -- --    03/17/25 0400 97.4 °F (36.3 °C) 58 18 103/58 74 98 % -- -- -- -- -- -- -- -- --    03/17/25 0300 -- 59 19 100/62 76 99 % -- -- -- -- -- -- -- -- --    03/17/25 0200 -- 58 18 106/59 75 99 % -- -- -- -- -- -- -- -- --    03/17/25 0100 -- 57 19 98/59 74 99 % -- -- -- -- -- -- -- -- --    03/17/25 0000 97.4 °F (36.3 °C) 63 23 107/64 81 100 % -- -- -- -- BiPAP -- -- 14 No Pain    03/16/25 2300 -- 58 20 110/64 80 99 % -- -- -- -- -- -- -- -- --    03/16/25 2200 -- 56 18 110/57 77 99 % -- -- -- -- -- -- -- -- --    03/16/25 2100 -- 78 32 107/61 79 99 % -- -- -- -- -- -- -- -- --    03/16/25 2000 98.2 °F (36.8 °C) 75 21 100/61 76 100 % -- 44 -- 6 L/min Nasal cannula -- -- 14 No Pain    03/16/25 1800 -- 73 21 102/61 77 99 % -- -- -- -- -- -- -- -- --    03/16/25 1700 -- 89 31 108/62 78 98 % -- -- -- -- -- -- -- -- --    03/16/25 1600 97.2 °F (36.2 °C) 94 43 117/76 92 98 % -- -- -- -- -- -- -- 13 No Pain     03/16/25 1500 -- 76 21 103/56 74 99 % -- -- -- -- -- -- -- -- --    03/16/25 1400 -- 82 39 109/63 81 100 % -- -- -- -- -- -- -- -- --    03/16/25 1351 -- -- -- -- -- 99 % -- -- 6 L/min -- Nasal cannula -- -- -- --    03/16/25 1300 -- 63 27 122/59 82 98 % -- -- 6 L/min -- Nasal cannula -- -- -- --    03/16/25 1200 -- 61 20 108/60 77 98 % -- -- -- -- -- -- -- 13 No Pain    03/16/25 1128 -- 60 -- 98/56 -- -- -- -- -- -- -- -- -- -- --    03/16/25 1100 -- 68 24 106/64 80 97 % -- -- -- -- -- -- -- -- --    03/16/25 1000 -- 82 27 113/67 85 98 % -- -- -- -- -- -- -- -- --    03/16/25 0945 -- -- -- -- -- -- -- -- -- -- BiPAP -- -- -- --    03/16/25 0900 -- 101 37 106/70 84 98 % -- 44 -- 6 L/min Nasal cannula -- -- -- --    03/16/25 0800 97.3 °F (36.3 °C) 70 26 109/58 80 98 % -- -- -- -- -- -- -- 13 No Pain    03/16/25 0725 -- -- -- -- -- 97 % -- -- -- -- -- Face mask -- -- --    03/16/25 0700 -- 60 17 99/54 70 98 % -- -- -- -- -- -- -- -- --    03/16/25 0600 -- 60 20 98/56 69 98 % -- -- -- -- -- -- -- -- --    03/16/25 0500 -- 61 19 99/59 75 99 % -- -- -- -- -- -- -- -- --    03/16/25 0400 97.5 °F (36.4 °C) 61 24 115/59 81 98 % -- -- -- -- -- -- -- 13 No Pain    03/16/25 0328 -- -- -- -- -- -- -- -- -- -- -- Face mask -- -- --    03/16/25 0300 -- 61 17 115/63 84 99 % -- -- -- -- -- -- -- -- --    03/16/25 0200 -- 64 20 104/58 76 98 % -- -- -- -- -- -- -- -- --    03/16/25 0100 -- 71 17 100/60 75 99 % -- -- -- -- -- -- -- -- --    03/16/25 0000 97.9 °F (36.6 °C) 67 16 106/62 79 99 % -- -- -- -- -- -- -- 13 --    03/15/25 2300 -- 73 17 102/63 77 98 % -- -- -- -- -- -- -- -- --    03/15/25 2200 -- 79 18 101/56 72 99 % -- -- -- -- -- -- -- -- --    03/15/25 2100 -- 88 22 110/76 88 99 % -- -- -- -- -- -- -- -- --    03/15/25 2021 -- -- -- -- -- -- -- -- -- -- -- Face mask -- -- --    03/15/25 2000 98.3 °F (36.8 °C) 73 17 106/65 80 100 % 40 -- -- -- BiPAP -- Lying 13 No Pain    03/15/25 1900 -- 73 24 102/63 77 100 % -- -- -- --  -- -- -- -- --    03/15/25 1843 -- 76 24 -- -- 99 % -- -- -- -- -- -- -- -- --    03/15/25 1800 -- 78 38 98/61 74 100 % -- -- -- -- -- -- -- -- --    03/15/25 1700 -- 70 26 93/60 72 100 % -- -- -- -- -- -- -- -- --    03/15/25 1650 -- -- -- -- -- 99 % -- -- -- -- -- -- -- -- --    03/15/25 1600 98.2 °F (36.8 °C) 75 28 97/62 75 99 % -- -- -- -- -- -- -- 14 --    03/15/25 1543 -- -- -- -- -- 98 % -- -- -- -- -- Face mask -- -- --    03/15/25 1541 -- 80 15 -- -- -- -- -- -- -- -- -- -- -- --    03/15/25 1522 -- -- -- 90/61 71 -- -- -- -- -- -- -- -- -- --    03/15/25 1500 -- 82 28 89/57 67 -- -- -- -- -- -- -- -- -- --    03/15/25 1406 -- -- -- -- -- 97 % -- -- -- -- -- -- -- -- --    03/15/25 1400 -- 81 27 108/61 80 97 % -- -- -- -- -- -- -- -- --    03/15/25 1331 -- -- -- -- -- 98 % -- -- -- -- -- Face mask -- -- --    03/15/25 1300 -- 81 27 99/61 75 98 % -- -- -- -- -- -- -- -- --    03/15/25 1246 -- -- -- -- -- 98 % -- -- -- -- -- Face mask -- -- --    03/15/25 1200 -- 78 27 98/62 75 99 % -- -- -- -- -- -- -- 14 No Pain    03/15/25 1100 -- 76 23 109/71 85 99 % -- -- -- -- -- -- -- 14 --    03/15/25 1030 98.5 °F (36.9 °C) 85 24 103/62 78 97 % -- -- -- -- -- -- -- -- --    03/15/25 1000 -- -- -- -- -- -- -- -- -- -- -- Face mask -- -- --    03/15/25 0913 -- -- -- -- -- -- -- -- -- -- -- Face mask -- -- --    03/15/25 0900 -- 102 31 127/78 93 90 % -- -- -- -- BiPAP -- Lying -- --    03/15/25 0846 96.5 °F (35.8 °C) -- -- -- -- -- -- -- -- -- -- -- -- -- --    03/15/25 0838 -- -- -- -- -- 91 % -- -- -- -- BiPAP -- -- -- --    03/15/25 0830 -- 108 34 -- -- 78 % -- 40 -- 5 L/min Nasal cannula -- -- -- --    03/15/25 0800 -- 88 27 110/67 81 91 % -- -- -- -- BiPAP -- Lying -- --    03/15/25 0700 -- 96 33 102/68 80 93 % -- -- -- -- BiPAP -- Lying -- --    03/15/25 0600 -- 84 22 100/63 78 95 % -- -- -- -- BiPAP -- -- -- No Pain    03/15/25 0400 -- -- -- -- -- -- -- -- -- -- -- Face mask -- -- --    03/15/25 0300 -- 86 25  103/67 79 100 % -- -- -- -- BiPAP -- Lying -- --    03/15/25 0200 -- 88 32 103/70 83 99 % -- -- -- -- BiPAP -- Lying -- --    03/15/25 0000 -- 84 24 106/70 84 98 % -- -- -- -- BiPAP -- Lying -- --          Weight (last 2 days)       Date/Time Weight    03/18/25 0500 44.8 (98.77)    03/17/25 0600 46 (101.41)    03/16/25 1128 46.3 (102)    03/16/25 0500 46.5 (102.51)            Pertinent Labs/Diagnostic Results:   Radiology:  XR chest portable   Final Interpretation by Suhas Graham MD (03/14 1320)      Cardiomegaly and prominent vasculature, possibly related to positioning though pulmonary vascular congestion is not excluded. Clinical correlation for congestive heart failure recommended.            Workstation performed: [id number]           Cardiology:  ECG 12 lead   Final Result by Nguyễn Funez MD (03/17 4804)   Normal sinus rhythm   Left bundle branch block   Abnormal ECG   When compared with ECG of 17-Mar-2025 13:37, (unconfirmed)   Premature ventricular complexes are no longer Present   Confirmed by Nguyễn Funez ([id number]) on 3/17/2025 1:54:31 PM      ECG 12 lead   Final Result by Nguyễn Funez MD (03/17 2664)   Sinus rhythm with occasional Premature ventricular complexes   Left bundle branch block   Abnormal ECG   No previous ECGs available   Confirmed by Nguyễn Funez ([id number]) on 3/17/2025 1:54:33 PM      ECG 12 lead   Final Result by Nguyễn Funez MD (03/17 6781)   Normal sinus rhythm   Left bundle branch block   Abnormal ECG   When compared with ECG of 17-Mar-2025 01:44, (unconfirmed)   Vent. rate has increased by  36 bpm   Confirmed by Nguyễn Funez ([id number]) on 3/17/2025 1:28:16 PM      ECG 12 lead   Final Result by Nguyễn Funez MD (03/17 6202)   Normal sinus rhythm   Left bundle branch block   Abnormal ECG   When compared with ECG of 16-Mar-2025 17:13, (unconfirmed)   No significant change was found   Confirmed by Nguyễn Funez ([id number]) on 3/17/2025 1:30:08 PM      ECG 12 lead   Final  Result by Nguyễn Funez MD (03/17 3112)   Normal sinus rhythm   Left bundle branch block   Abnormal ECG   When compared with ECG of 16-Mar-2025 15:45, (unconfirmed)   No significant change was found   Confirmed by Nguyễn Funez ([id number]) on 3/17/2025 1:32:17 PM      ECG 12 lead   Final Result by Nguyễn Funez MD (03/17 8872)   Sinus rhythm with marked sinus arrhythmia   Left bundle branch block   Abnormal ECG   When compared with ECG of 16-Mar-2025 09:21, (unconfirmed)   Vent. rate has decreased by  46 bpm   Confirmed by Nguyễn Funez ([id number]) on 3/17/2025 1:32:21 PM      Echo complete w/ contrast if indicated   Final Result by Lewis Macias MD (03/17 0157)        Left Ventricle: Left ventricular cavity size is severely dilated. Wall    thickness is normal. The left ventricular ejection fraction is 25%.    Systolic function is severely reduced. There is global hypokinesis with    regional variation. Diastolic function is mildly abnormal, consistent with    grade I (abnormal) relaxation.     IVS: There is abnormal septal motion consistent with left bundle branch    block.     Right Ventricle: Right ventricular cavity size is moderately dilated.    Systolic function is low normal.     Left Atrium: The atrium is severely dilated.     Tricuspid Valve: There is mild regurgitation. The right ventricular    systolic pressure is mildly elevated. The estimated right ventricular    systolic pressure is 42.00 mmHg.         ECG 12 lead   Final Result by Nguyễn Funez MD (03/17 8642)   Sinus tachycardia   Left bundle branch block   Abnormal ECG   When compared with ECG of 15-Mar-2025 19:01, (unconfirmed)   Vent. rate has increased by  48 bpm   Confirmed by Nguyễn Funez ([id number]) on 3/17/2025 1:32:35 PM      ECG 12 lead   Final Result by Nguyễn Funez MD (03/17 8474)   Sinus rhythm with blocked PACs   Left bundle branch block   Abnormal ECG   When compared with ECG of 15-Mar-2025 10:17, (unconfirmed)   No significant  change was found   Confirmed by Nguyễn Funez ([id number]) on 3/17/2025 1:39:27 PM      ECG 12 lead   Final Result by Nguyễn Funez MD (03/17 1342)   Sinus rhythm with Premature atrial complexes   Left bundle branch block   Abnormal ECG   No previous ECGs available   Confirmed by Nguyễn Funez ([id number]) on 3/17/2025 1:42:30 PM      ECG 12 lead   Final Result by Jae Martines MD (03/15 0727)   Poor data quality, interpretation may be adversely affected   Possible Sinus rhythm with occasional Premature ectopic complexes   Incomplete left bundle branch block   Baseline Artifact   Abnormal ECG   Confirmed by Jae Martines ([id number]) on 3/15/2025 7:27:37 AM        GI:  No orders to display       Results from last 7 days   Lab Units 03/14/25 2344   SARS-COV-2  Negative     Results from last 7 days   Lab Units 03/18/25  0521 03/17/25  0502 03/16/25  0509 03/15/25  0414 03/14/25  2348 03/14/25  1039   WBC Thousand/uL 3.91* 4.17* 2.81* 4.48  --  7.83   HEMOGLOBIN g/dL 10.9* 10.5* 10.3* 10.9*  --  11.9*   HEMATOCRIT % 37.1 35.8* 34.8* 37.6  --  40.6   PLATELETS Thousands/uL 160 173 178 168   < > 207   TOTAL NEUT ABS Thousands/µL 3.37 3.67 2.38 3.39  --  5.76    < > = values in this interval not displayed.         Results from last 7 days   Lab Units 03/18/25  0521 03/17/25  0502 03/16/25  0509 03/15/25  0414 03/14/25  1039   SODIUM mmol/L 137 141 144 139 136   POTASSIUM mmol/L 3.3* 3.7 3.9 4.4 4.6   CHLORIDE mmol/L 89* 88* 90* 91* 84*   CO2 mmol/L 45* >45* >45* 44* >45*   ANION GAP mmol/L 3*  --   --  4  --    BUN mg/dL 40* 44* 49* 36* 38*   CREATININE mg/dL 0.68 0.68 0.85 0.75 1.15   EGFR ml/min/1.73sq m 98 98 90 94 65   CALCIUM mg/dL 8.2* 8.5 8.2* 8.2* 8.7   MAGNESIUM mg/dL 2.1 2.3 2.2 2.1  --    PHOSPHORUS mg/dL 3.0 3.4 3.7 4.7*  --      Results from last 7 days   Lab Units 03/18/25  0521 03/17/25  0502 03/16/25  0509 03/15/25  0414 03/14/25  1039   AST U/L 28 46* 63* 118* 88*   ALT U/L 42 59* 64* 88* 57*   ALK PHOS U/L 44 50  "51 52 60   TOTAL PROTEIN g/dL 5.7* 5.9* 5.8* 5.9* 7.2   ALBUMIN g/dL 3.4* 3.8 3.4* 3.6 4.2   TOTAL BILIRUBIN mg/dL 0.42 0.48 0.46 0.41 0.71         Results from last 7 days   Lab Units 03/18/25  0521 03/17/25  0502 03/16/25  0509 03/15/25  0414 03/14/25  1039   GLUCOSE RANDOM mg/dL 153* 144* 113 101 132         Results from last 7 days   Lab Units 03/14/25  1039   HEMOGLOBIN A1C % 6.1*   EAG mg/dl 128     No results found for: \"BETA-HYDROXYBUTYRATE\"       Results from last 7 days   Lab Units 03/18/25  0725 03/17/25  1945 03/17/25  0502   PH JHONATHAN  7.363 7.259* 7.366   PCO2 JHONATHAN mm Hg 82.8* 103.1* 81.9*   PO2 JHONATHAN mm Hg 79.1* 86.6* 57.0*   HCO3 JHONATHAN mmol/L 46.0* 45.1* 45.9*   BASE EXC JHONATHAN mmol/L 16.9 14.0 16.9   O2 CONTENT JHONATHAN ml/dL 15.9 16.2 14.1   O2 HGB, VENOUS % 94.8* 94.5* 85.6*                     Results from last 7 days   Lab Units 03/14/25  1139   PROTIME seconds 11.9*   INR  0.84*   PTT seconds 22*     Results from last 7 days   Lab Units 03/16/25  0509   TSH 3RD GENERATON uIU/mL 0.534     Results from last 7 days   Lab Units 03/15/25  0414 03/14/25  1039   PROCALCITONIN ng/ml 0.42* 0.21     Results from last 7 days   Lab Units 03/14/25  1039   LACTIC ACID mmol/L 1.4             Results from last 7 days   Lab Units 03/14/25  2348   BNP pg/mL 1,079*                                         Results from last 7 days   Lab Units 03/14/25  2344   INFLUENZA A PCR  Negative   INFLUENZA B PCR  Negative   RSV PCR  Negative                             Results from last 7 days   Lab Units 03/14/25  1039   BLOOD CULTURE  No Growth After 4 Days.  No Growth After 4 Days.                   Network Utilization Review Department  ATTENTION: Please call with any questions or concerns to [phone] and carefully listen to the prompts so that you are directed to the right person. All voicemails are confidential.   For Discharge needs, contact Care Management DC Support Team at [phone] opt. 2  Send all requests for admission " clinical reviews, approved or denied determinations and any other requests to dedicated fax number below belonging to the campus where the patient is receiving treatment. List of dedicated fax numbers for the Facilities:  FACILITY NAME UR FAX NUMBER   ADMISSION DENIALS (Administrative/Medical Necessity) [phone]   DISCHARGE SUPPORT TEAM (NETWORK) [phone]   PARENT CHILD HEALTH (Maternity/NICU/Pediatrics) [phone]   General acute hospital [phone]   [facility] [phone]   [facility] [phone]   [facility] [phone]   [facility] [phone]   [facility] [phone]   [facility] [phone]   [facility] [phone]   [facility] [phone]   [facility] [phone]   [facility] [phone]   [facility] [phone]

## 2025-03-18 NOTE — OCCUPATIONAL THERAPY NOTE
"          Occupational Therapy         Patient Name: Natalio Hartmann Jr.  Today's Date: 3/18/2025       03/18/25 1500   OT Last Visit   OT Visit Date 03/18/25   Note Type   Note type Cancelled Session   Cancel Reasons Refusal   Additional Comments pt refused to participate despite encouragement from family and therapist stating \"why bother\" - agreeable to have therapy check with him again tomorrow         Manjula Maria                             "

## 2025-03-18 NOTE — PROGRESS NOTES
Progress Note - Critical Care/ICU   Name: Natalio Hartmann Jr. 69 y.o. male I MRN: [id number]  Unit/Bed#: MICU 13 I Date of Admission: 3/14/2025   Date of Service: 3/18/2025 I Hospital Day: 4       Assessment & Plan   Active Hospital Problems    Diagnosis Date Noted POA    Acute on chronic respiratory failure with hypoxia and hypercapnia (HCC) 11/19/2020 Yes    Severe protein-calorie malnutrition (HCC) 10/14/2023 Yes    Metabolic encephalopathy 07/20/2023 Yes    Malnutrition (HCC) 06/10/2023 Yes    Hyperlipidemia 05/02/2023 Yes    COPD exacerbation (HCC) 04/28/2023 Yes    SIRS (systemic inflammatory response syndrome) 02/17/2023 Yes    Acute on chronic systolic congestive heart failure (HCC) 09/12/2022 Yes    Goals of care, counseling/discussion 02/25/2021 Not Applicable    Obstructive sleep apnea 07/29/2013 Yes      Resolved Hospital Problems   No resolved problems to display.       Neuro:   Diagnosis: Acute metabolic encephalopathy  Most likely 2/2 hypoxic hypercarbia   Resolved  Plan: CAM-ICU  Delirium precuations  Avoid neurotoxin  On bipap           CV:   Diagnosis: Acute on chronic HEFrEF  Nonischemic cardiomyopathy  BNP elevated 1079  Echo 03/16: EF 25% systolic function severely reduced severe global hypokinesis LA moderately dilated mild TR, no aortic stenosis, trace MR, mild TR normal RVSP, no pericardial effusion   Home diuretic regimen: Torsemide 20 mg alternating with 40 mg QOD  Outpatient cardiology notes review recommendation for palliative care   Plan:   IV Lasix 40 mg BID  Monitor U/O: Goal - 1L in 24 hours  Monitor daily weights  Monitor BMP and electrolytes   Cardiology reccs apreciated     Pulm:  Diagnosis: Acute on chronic respiratory failure with hypoxia and hypercarbia  Multifactorial with COPD and CHF exacerbation  Bipap required in the ED  VBG in ED: 7.1/ 146  NC at home baseline 4L   Pro-Jorge Alberto negative BNP pending.   X-ray reviewed cardiomegaly, pulmonary congestion noted  Plan:   S/p IV  azithromycin  Continue supplemental oxygen goal oxygen >88%  Bipap at night and as needed during day  Encourage incentive spirometry  Diuretics for CHF exacerbation and respiratory treatment for COPD  S/p Diamox 2 doses  IV solumderol         COPD exacerbation:  Vbg 7.1/146  Required Bipap in the ED   Plan:   Continue supplemental oxygen goal oxygen >88%  Bipap at night and as needed during day  Encourage incentive spirometry  respiratory treatment for COPD  IV solumderol         KEVIN:  On trilogy NIV at night  Bipap at night      GI:   Diagnosis: Severe protein-calorie malnutrition  Plan: Consult nutrition        Diagnosis: Elevated transaminitis most likely secondary to congestive hepatopathy  Improving  Plan: Monitor CMP     :   No active issues     F/E/N:   F: None  E: Replete electrolytes as needed   N: rCLD      Heme/Onc:   Diagnosis: Macrocytic anemia, chronic  Baseline around 9ish    Most likely nutritional deficiency  Plan: Monitor Hgb and transfuse <7  Continue DVT ppx with Lovenox 40 mg sq.     Endo:   No active issues     ID:   Diagnosis: SIRS Criteria  Met SIRS criteria upon admission   Sepsis workup done admission  Procal 0.4  B cx: negative at 48 hours  S/p Empiric treatment with ceftraixone for PNA  Plan:   Continue CTX for PNA  Follow-up on cultures     MSK/Skin:   No active issues  Disposition: Critical care    ICU Core Measures     A: Assess, Prevent, and Manage Pain Has pain been assessed? NA  Need for changes to pain regimen? NA   B: Both SAT/SAT  N/A   C: Choice of Sedation RASS Goal: 0 Alert and Calm or N/A patient not on sedation  Need for changes to sedation or analgesia regimen? NA   D: Delirium CAM-ICU: Negative   E: Early Mobility  Plan for early mobility? Yes   F: Family Engagement Plan for family engagement today? Yes       Antibiotic Review: Patient on appropriate coverage based on culture data.       Prophylaxis:  VTE VTE covered by:  heparin (porcine), Subcutaneous, 5,000  Units at 03/18/25 0522       Stress Ulcer  not ordered         24 Hour Events : VBG overnight 7.29/103. Patient on Bipap  Subjective   Review of Systems: See HPI for Review of Systems    Objective :                   Vitals I/O      Most Recent Min/Max in 24hrs   Temp 98.6 °F (37 °C) Temp  Min: 97.7 °F (36.5 °C)  Max: 98.6 °F (37 °C)   Pulse 63 Pulse  Min: 60  Max: 102   Resp (!) 27 Resp  Min: 18  Max: 34   BP 95/61 BP  Min: 88/63  Max: 128/68   O2 Sat 98 % SpO2  Min: 95 %  Max: 100 %      Intake/Output Summary (Last 24 hours) at 3/18/2025 0707  Last data filed at 3/17/2025 1800  Gross per 24 hour   Intake 840 ml   Output 725 ml   Net 115 ml       Diet Dysphagia/Modified Consistency; Dysphagia 3-Dental Soft; Thin Liquid    Invasive Monitoring           Physical Exam   Physical Exam  Eyes:      Pupils: Pupils are equal, round, and reactive to light.   Skin:     General: Skin is warm.   HENT:      Head: Normocephalic.      Mouth/Throat:      Mouth: Mucous membranes are dry.   Cardiovascular:      Rate and Rhythm: Normal rate and regular rhythm.      Pulses: Normal pulses.   Musculoskeletal:      Right lower leg: No edema.      Left lower leg: No edema.   Abdominal:      Palpations: Abdomen is soft.   Constitutional:       Appearance: He is ill-appearing.      Comments: cachectic   Pulmonary:      Effort: Pulmonary effort is normal.      Breath sounds: Wheezing present.   Neurological:      Mental Status: He is oriented to person, place and time.          Diagnostic Studies        Lab Results: I have reviewed the following results:     Medications:  Scheduled PRN   aspirin, 81 mg, Daily  benzonatate, 200 mg, TID  budesonide, 0.5 mg, Q12H  cefTRIAXone, 1,000 mg, Q24H  chlorhexidine, 15 mL, Q12H GABE  docusate sodium, 100 mg, BID  formoterol, 20 mcg, Q12H  furosemide, 40 mg, BID  heparin (porcine), 5,000 Units, Q8H AGBE  ipratropium, 0.5 mg, TID  levalbuterol, 1.25 mg, TID  methylPREDNISolone sodium succinate, 40 mg, Q12H  GABE  polyethylene glycol, 17 g, Daily  potassium chloride, 20 mEq, Q2H  pravastatin, 80 mg, Daily With Dinner      acetaminophen, 650 mg, Q6H PRN  aluminum-magnesium hydroxide-simethicone, 30 mL, Q6H PRN  dextromethorphan-guaiFENesin, 10 mL, Q4H PRN  ondansetron, 4 mg, Q6H PRN       Continuous          Labs:   CBC    Recent Labs     03/17/25  0502 03/18/25  0521   WBC 4.17* 3.91*   HGB 10.5* 10.9*   HCT 35.8* 37.1    160     BMP    Recent Labs     03/17/25  0502 03/18/25  0521   SODIUM 141 137   K 3.7 3.3*   CL 88* 89*   CO2 >45* 45*   AGAP  --  3*   BUN 44* 40*   CREATININE 0.68 0.68   CALCIUM 8.5 8.2*       Coags    No recent results     Additional Electrolytes  Recent Labs     03/17/25  0502 03/18/25  0521   MG 2.3 2.1   PHOS 3.4 3.0          Blood Gas    No recent results  Recent Labs     03/17/25 1945   PHVEN 7.259*   XRQ5RZG 103.1*   PO2VEN 86.6*   [id number] 45.1*   BEVEN 14.0   [id number] 94.5*    LFTs  Recent Labs     03/17/25  0502 03/18/25  0521   ALT 59* 42   AST 46* 28   ALKPHOS 50 44   ALB 3.8 3.4*   TBILI 0.48 0.42       Infectious  No recent results  Glucose  Recent Labs     03/17/25  0502 03/18/25  0521   GLUC 144* 153*

## 2025-03-18 NOTE — PROGRESS NOTES
Heart Failure/ Pulmonary Hypertension Progress Note - Natalio Hartmann Jr. 69 y.o. male MRN: [id number]    Unit/Bed#: MICU 13 Encounter: [id number]      Assessment:    Principal Problem:    Acute on chronic respiratory failure with hypoxia and hypercapnia (HCC)  Active Problems:    Obstructive sleep apnea    Acute on chronic systolic congestive heart failure (HCC)    Goals of care, counseling/discussion    SIRS (systemic inflammatory response syndrome)    COPD exacerbation (HCC)    Hyperlipidemia    Malnutrition (HCC)    Metabolic encephalopathy    Severe protein-calorie malnutrition (HCC)      Subjective:   Natalio Hartmann Jr. is a 69 y.o. male with a PMH of severe COPD, chronic hypoxic and hypercapnic respiratory failure on 4 L supplemental oxygen, cachexia, nonischemic cardiomyopathy, HFrEF/Stage C/D who presents to the ER with worsening shortness of breath,  increasingly lethargic feeling, and fatigue.  On day of admit, he was visibly short of breath slumped in the chair noted by his wife and brought to the ER.       In the ED he is noted to be hypoxic and hypercapnic requiring BiPAP. BNP on admit>1000.  CXR shows pulmonary congestion. IV Lasix started in the ER.  Unable to wean off Bipap--admitted to MICU.      Patient seen and examined.  No significant events overnight.     Objective:   Intake/ Output: 840/1225/-385  Weight: bed weights  Tele: SR/ST  MAPs: 75-80 mmHg    Acute on chronic HFrEF; LVEF 20-25%; NYHA III; ACC/AHA Stage C/D  -Etiology: Nonischemic cardiomyopathy.  Possible dyssynchrony from chronic LBBB.  Despite QRS only being 120 ms, echo shows significant dyssynchrony.  -warm on exam, hemodynamically stable. He is cachectic with end stage COPD. Difficult to assess JVP given significant respiratory variation  -TTE findings suggestive of volume overload state  -replace K. Continue diuresis with IV Lasix. Would change BP hold parameters to ensure dose administration.   --ongoing discussions with patient and  family regarding the appropriateness of palliative care involvement. They continue to refuse.      TTE 3/16/25:LVEF 25%, RV dilated, atria severely dilated, mild TR, est PASP 42 mmHg, IVC dilated, est RAP 15 mmHg   TTE 9/6/24: LVEF 25%  TTE 5/1/23: LVEF 20%. Severe global hypokinesis with regional variation. RV cavity size normal, systolic function normal. Trace MR, TR.   TTE 2/21/23: LVEF 30%. LVIDd 6.6cm. severe global hypokinesis. Grade I DD. RV cavity size and systolic function normal. Mild TR.   TTE 2/12/2023: LVEF 20-25%.  Severe global hypokinesis with regional variation.  Grade 1 DD.  Abnormal septal motion consistent with LBBB.  RV cavity mildly dilated, normal systolic function.  Mild MR, mild TR.  RVSP 38.  Dilated IVC.  TTE 8/26/2022: LVEF 40%.  RV cavity mildly dilated.  Systolic function normal.  Mild MR, TR.  Normal IVC.     Neurohormonal Blockade: GDMT limited by hypotension  --Beta Blocker: no  --ARNi / ACEi / ARB: losartan 12.5 mg QD, off   --Aldosterone Antagonist: no   --SGLT2 Inhibitor: no  --Home Diuretic: torsemide 40 mg QD alternating with 20 mg QD  --Inpatient diuretic : Lasix 40 mg IV BID, Diamox 250 mg IV      Sudden Cardiac Death Risk Reduction:  --ICD: LVEF 20%.      Electrical Resynchronization:  --Candidacy for BiV device: QRSd 120ms, chronic LBBB with dyssynchrony on echocardiogram.     Advanced Therapies (if appropriate): Not appropriate at this time, particularly considering advanced lung disease.        Acute on Chronic Respiratory Failure with hypoxia and hypercapnia   -Required BiPAP in the ER--pCO2 on admit 140  -Multifactorial with COPD and CHF exacerbation contributing  -O2 requirements coming down.   -IV steroids, IV Lasix per CC team.     COPD (end stage)  Former smoker; 105 pack years, quit in 2012.  Severe disease,   On home oxygen.  Multiple hospitalizations with acute COPD exacerbations   Follows with pulm    H/O COVID-19    Nonobstructive CAD  Has coronary calcium on  "CT  Previously on Rosuvastatin 10 mg. Notes indicate ASA, but not listed in EMR    Hyperlipidemia        Lab Results   Component Value Date     LDLCALC 75 08/19/2021    Continue statin    KEVIN   On BiPAP nightly    GOC.    -palliative care discussed. He is not interested       Review of Systems   All other systems reviewed and are negative.       Central Line (day, reason):  Stanley catheter (day, reason):    Vitals: Blood pressure 95/61, pulse 63, temperature 98.6 °F (37 °C), temperature source Oral, resp. rate (!) 27, height 5' 2\" (1.575 m), weight 44.8 kg (98 lb 12.3 oz), SpO2 98%., Body mass index is 18.06 kg/m²., I/O last 3 completed shifts:  In: 1090 [P.O.:480; I.V.:60; IV Piggyback:550]  Out: 1375 [Urine:1375]  No intake/output data recorded.  Wt Readings from Last 3 Encounters:   03/18/25 44.8 kg (98 lb 12.3 oz)   02/21/25 44.3 kg (97 lb 11.2 oz)   09/06/24 44.9 kg (99 lb)       Intake/Output Summary (Last 24 hours) at 3/18/2025 0900  Last data filed at 3/17/2025 1800  Gross per 24 hour   Intake 720 ml   Output 725 ml   Net -5 ml     I/O last 3 completed shifts:  In: 1090 [P.O.:480; I.V.:60; IV Piggyback:550]  Out: 1375 [Urine:1375]        Physical Exam:  Vitals:    03/18/25 0300 03/18/25 0400 03/18/25 0500 03/18/25 0600   BP: 109/65 98/62 99/62 95/61   Pulse: 64 64 62 63   Resp: (!) 24 21 20 (!) 27   Temp:       TempSrc:       SpO2: 100% 99% 98% 98%   Weight:   44.8 kg (98 lb 12.3 oz)    Height:           GEN: Natalio Hartmann Jr. Is ill appearing,cachectic,  tachypneic,   HEENT: pupils equal, round, and reactive to light; extraocular muscles intact  NECK: supple, no carotid bruits   HEART: regular rhythm, normal S1 and S2, no murmurs, clicks, gallops or rubs, JVP is  elevated  LUNGS:diminished, coarse througout  ABDOMEN: normal bowel sounds, soft, no tenderness, no distention  EXTREMITIES: peripheral pulses normal; no clubbing, cyanosis, or edema  NEURO: no focal findings   SKIN: normal without suspicious lesions " on exposed skin      Current Facility-Administered Medications:     acetaminophen (TYLENOL) tablet 650 mg, 650 mg, Oral, Q6H PRN, Sadie Sung MD    aluminum-magnesium hydroxide-simethicone (MAALOX) oral suspension 30 mL, 30 mL, Oral, Q6H PRN, Sadie Sung MD    aspirin chewable tablet 81 mg, 81 mg, Oral, Daily, DELIA Bowens, 81 mg at 03/17/25 1621    benzonatate (TESSALON PERLES) capsule 200 mg, 200 mg, Oral, TID, Sadie Sung MD, 200 mg at 03/17/25 2007    budesonide (PULMICORT) inhalation solution 0.5 mg, 0.5 mg, Nebulization, Q12H, Sadie Sung MD, 0.5 mg at 03/18/25 0710    ceftriaxone (ROCEPHIN) 1 g/50 mL in dextrose IVPB, 1,000 mg, Intravenous, Q24H, Yasmin Bennett MD, Stopped at 03/17/25 1800    chlorhexidine (PERIDEX) 0.12 % oral rinse 15 mL, 15 mL, Mouth/Throat, Q12H [facility] hospital[facility]n DO, 15 mL at 03/17/25 2007    dextromethorphan-guaiFENesin (ROBITUSSIN DM) oral syrup 10 mL, 10 mL, Oral, Q4H PRN, Sadie Sung MD    docusate sodium (COLACE) capsule 100 mg, 100 mg, Oral, BID, Sadie Sung MD    formoterol (PERFOROMIST) nebulizer solution 20 mcg, 20 mcg, Nebulization, Q12H, Yasmin Bennett MD, 20 mcg at 03/18/25 0710    furosemide (LASIX) injection 40 mg, 40 mg, Intravenous, BID, Sadie Sung MD, 40 mg at 03/17/25 1717    heparin (porcine) subcutaneous injection 5,000 Units, 5,000 Units, Subcutaneous, Q8H GABENguyễn DO, 5,000 Units at 03/18/25 0522    ipratropium (ATROVENT) 0.02 % inhalation solution 0.5 mg, 0.5 mg, Nebulization, TID, Sadie Sung MD, 0.5 mg at 03/18/25 0710    levalbuterol (XOPENEX) inhalation solution 1.25 mg, 1.25 mg, Nebulization, TID, Sadie Sung MD, 1.25 mg at 03/18/25 0711    methylPREDNISolone sodium succinate (Solu-MEDROL) injection 40 mg, 40 mg, Intravenous, Q12H GABE, Yasmin Bennett MD, 40 mg at 03/17/25 2007    ondansetron (ZOFRAN) injection 4 mg, 4 mg,  Intravenous, Q6H PRN, Sadie Sung MD    polyethylene glycol (MIRALAX) packet 17 g, 17 g, Oral, Daily, Sadie Sung MD    potassium chloride 20 mEq IVPB (premix), 20 mEq, Intravenous, Q2H, Richelle Martines MD    pravastatin (PRAVACHOL) tablet 80 mg, 80 mg, Oral, Daily With Dinner, DELIA Bowens, 80 mg at 03/17/25 1621      Labs & Results:        Results from last 7 days   Lab Units 03/18/25  0521 03/17/25  0502 03/16/25  0509   WBC Thousand/uL 3.91* 4.17* 2.81*   HEMOGLOBIN g/dL 10.9* 10.5* 10.3*   HEMATOCRIT % 37.1 35.8* 34.8*   PLATELETS Thousands/uL 160 173 178         Results from last 7 days   Lab Units 03/18/25  0521 03/17/25  0502 03/16/25  0509   POTASSIUM mmol/L 3.3* 3.7 3.9   CHLORIDE mmol/L 89* 88* 90*   CO2 mmol/L 45* >45* >45*   BUN mg/dL 40* 44* 49*   CREATININE mg/dL 0.68 0.68 0.85   CALCIUM mg/dL 8.2* 8.5 8.2*   ALK PHOS U/L 44 50 51   ALT U/L 42 59* 64*   AST U/L 28 46* 63*     Results from last 7 days   Lab Units 03/14/25  1139   INR  0.84*         Bren LIN

## 2025-03-18 NOTE — CASE MANAGEMENT
Case Management Discharge Planning Note    Patient name Natalio Hartmann Jr.  Location MICU 13/MICU 13 MRN [id number]  : 1955 Date 3/18/2025       Current Admission Date: 3/14/2025  Current Admission Diagnosis:Acute on chronic respiratory failure with hypoxia and hypercapnia ([facility])   Patient Active Problem List    Diagnosis Date Noted Date Diagnosed    Iron deficiency anemia secondary to inadequate dietary iron intake 2024     SIADH (syndrome of inappropriate ADH production) ([facility]) 2024     Type 2 diabetes mellitus without complication, with long-term current use of insulin ([facility]) 2024     End stage COPD ([facility]) 2024     Dependence on respirator (ventilator) status ([facility]) 01/10/2024     Severe protein-calorie malnutrition ([facility]) 10/14/2023     History of bladder cancer 2023     Pulmonary cachexia due to COPD ([facility])      Chronic hypercapnia/KEVIN 2023     Metabolic encephalopathy 2023     Palliative care patient 2023     Alkalosis 2023     Malnutrition ([facility]) 06/10/2023     Hyperlipidemia 2023     COPD exacerbation ([facility]) 2023     Steroid-induced hyperglycemia 2023     Physical deconditioning 2023     Chronic anemia 2023     SIRS (systemic inflammatory response syndrome) 2023     Hyponatremia 2023     Acute on chronic systolic congestive heart failure ([facility]) 2022     Pulmonary nodules/lesions, multiple 2022     Prostate cancer ([facility]) 2021     BPH with obstruction/lower urinary tract symptoms 2021     Goals of care, counseling/discussion 2021     Acute on chronic respiratory failure with hypoxia and hypercapnia ([facility]) 2020     Macrocytosis without anemia 2019     Other insomnia 2019     GERD (gastroesophageal reflux disease) 2017     Nonobstructive atherosclerosis of coronary artery 2016     Mood disorder ([facility]) 2015     Prediabetes 2015     Osteoporosis  10/14/2014     Vitamin D deficiency 10/14/2014     Nonischemic cardiomyopathy (HCC) 09/26/2014     Left bundle-branch block 08/03/2013     Osteoarthritis 08/03/2013     Obstructive sleep apnea 07/29/2013       LOS (days): 4  Geometric Mean LOS (GMLOS) (days):   Days to GMLOS:     OBJECTIVE:  Risk of Unplanned Readmission Score: 24.53         Current admission status: Inpatient   Preferred Pharmacy:   [id number] - [place], [place] - [address] [place] [address]  [place] [address]  Phone: [phone] Fax: [phone]    Primary Care Provider: Fatmata Gustafson DO    Primary Insurance: BLUE CROSS  Secondary Insurance: MEDICARE    DISCHARGE DETAILS:                                                                                                 Additional Comments: CM reviewed case progress. On O2@4L nc, on Zithromax and Ceftriaxone IV. Eating with speech therapist. Refused PT. Pt not interested in palliative care at this time.

## 2025-03-18 NOTE — PHYSICAL THERAPY NOTE
Physical Therapy Cancellation Note    PT orders received chart review completed. Pt is currently refusing PT eval despite education for importance of early mobility and risk of immobility. PT will follow and eval as medically appropriate.     03/18/25 1400   Note Type   Note type Cancelled Session;Evaluation   Cancel Reasons Refusal       Andreina Mcdowell, PT

## 2025-03-19 LAB
BACTERIA BLD CULT: NORMAL
BACTERIA BLD CULT: NORMAL
BASE EX.OXY STD BLDV CALC-SCNC: 53.6 % (ref 60–80)
BASE EX.OXY STD BLDV CALC-SCNC: 87.4 % (ref 60–80)
BASE EX.OXY STD BLDV CALC-SCNC: 87.8 % (ref 60–80)
BASE EXCESS BLDV CALC-SCNC: 14.8 MMOL/L
BASE EXCESS BLDV CALC-SCNC: 16.6 MMOL/L
BASE EXCESS BLDV CALC-SCNC: 18.8 MMOL/L
BASOPHILS # BLD AUTO: 0 THOUSANDS/ÂΜL (ref 0–0.1)
BASOPHILS NFR BLD AUTO: 0 % (ref 0–1)
BODY TEMPERATURE: 97.6 DEGREES FEHRENHEIT
BUN SERPL-MCNC: 49 MG/DL (ref 5–25)
CALCIUM SERPL-MCNC: 8.7 MG/DL (ref 8.4–10.2)
CHLORIDE SERPL-SCNC: 86 MMOL/L (ref 96–108)
CO2 SERPL-SCNC: >45 MMOL/L (ref 21–32)
CREAT SERPL-MCNC: 0.79 MG/DL (ref 0.6–1.3)
EOSINOPHIL # BLD AUTO: 0 THOUSAND/ÂΜL (ref 0–0.61)
EOSINOPHIL NFR BLD AUTO: 0 % (ref 0–6)
ERYTHROCYTE [DISTWIDTH] IN BLOOD BY AUTOMATED COUNT: 13.2 % (ref 11.6–15.1)
GFR SERPL CREATININE-BSD FRML MDRD: 92 ML/MIN/1.73SQ M
GLUCOSE SERPL-MCNC: 107 MG/DL (ref 65–140)
HCO3 BLDV-SCNC: 44.4 MMOL/L (ref 24–30)
HCO3 BLDV-SCNC: 45.6 MMOL/L (ref 24–30)
HCO3 BLDV-SCNC: 47.8 MMOL/L (ref 24–30)
HCT VFR BLD AUTO: 35.7 % (ref 36.5–49.3)
HGB BLD-MCNC: 10.8 G/DL (ref 12–17)
IMM GRANULOCYTES # BLD AUTO: 0.03 THOUSAND/UL (ref 0–0.2)
IMM GRANULOCYTES NFR BLD AUTO: 1 % (ref 0–2)
LYMPHOCYTES # BLD AUTO: 0.54 THOUSANDS/ÂΜL (ref 0.6–4.47)
LYMPHOCYTES NFR BLD AUTO: 11 % (ref 14–44)
MAGNESIUM SERPL-MCNC: 2.1 MG/DL (ref 1.9–2.7)
MCH RBC QN AUTO: 30.4 PG (ref 26.8–34.3)
MCHC RBC AUTO-ENTMCNC: 30.3 G/DL (ref 31.4–37.4)
MCV RBC AUTO: 101 FL (ref 82–98)
MONOCYTES # BLD AUTO: 0.83 THOUSAND/ÂΜL (ref 0.17–1.22)
MONOCYTES NFR BLD AUTO: 16 % (ref 4–12)
NASAL CANNULA: 4.5
NASAL CANNULA: 5
NEUTROPHILS # BLD AUTO: 3.67 THOUSANDS/ÂΜL (ref 1.85–7.62)
NEUTS SEG NFR BLD AUTO: 72 % (ref 43–75)
NON VENT- BIPAP: ABNORMAL
NRBC BLD AUTO-RTO: 0 /100 WBCS
O2 CT BLDV-SCNC: 15.8 ML/DL
O2 CT BLDV-SCNC: 16 ML/DL
O2 CT BLDV-SCNC: 8.9 ML/DL
PCO2 BLD: 81.2 MM HG (ref 42–50)
PCO2 BLDV: 79.3 MM HG (ref 42–50)
PCO2 BLDV: 82.5 MM HG (ref 42–50)
PCO2 BLDV: 83.4 MM HG (ref 42–50)
PH BLD: 7.35 [PH] (ref 7.3–7.4)
PH BLDV: 7.34 [PH] (ref 7.3–7.4)
PH BLDV: 7.38 [PH] (ref 7.3–7.4)
PH BLDV: 7.38 [PH] (ref 7.3–7.4)
PHOSPHATE SERPL-MCNC: 1.8 MG/DL (ref 2.3–4.1)
PLATELET # BLD AUTO: 157 THOUSANDS/UL (ref 149–390)
PMV BLD AUTO: 10.1 FL (ref 8.9–12.7)
PO2 BLDV: 30.1 MM HG (ref 35–45)
PO2 BLDV: 58.3 MM HG (ref 35–45)
PO2 BLDV: 59.8 MM HG (ref 35–45)
PO2 VENOUS TEMP CORRECTED: 55.9 MM HG (ref 35–45)
POTASSIUM SERPL-SCNC: 3.4 MMOL/L (ref 3.5–5.3)
RBC # BLD AUTO: 3.55 MILLION/UL (ref 3.88–5.62)
SODIUM SERPL-SCNC: 139 MMOL/L (ref 135–147)
WBC # BLD AUTO: 5.07 THOUSAND/UL (ref 4.31–10.16)

## 2025-03-19 PROCEDURE — 94760 N-INVAS EAR/PLS OXIMETRY 1: CPT

## 2025-03-19 PROCEDURE — 94664 DEMO&/EVAL PT USE INHALER: CPT

## 2025-03-19 PROCEDURE — 82805 BLOOD GASES W/O2 SATURATION: CPT

## 2025-03-19 PROCEDURE — 94640 AIRWAY INHALATION TREATMENT: CPT

## 2025-03-19 PROCEDURE — 99232 SBSQ HOSP IP/OBS MODERATE 35: CPT | Performed by: INTERNAL MEDICINE

## 2025-03-19 PROCEDURE — 99291 CRITICAL CARE FIRST HOUR: CPT | Performed by: INTERNAL MEDICINE

## 2025-03-19 PROCEDURE — 85025 COMPLETE CBC W/AUTO DIFF WBC: CPT

## 2025-03-19 PROCEDURE — 84100 ASSAY OF PHOSPHORUS: CPT

## 2025-03-19 PROCEDURE — 83735 ASSAY OF MAGNESIUM: CPT

## 2025-03-19 PROCEDURE — 94660 CPAP INITIATION&MGMT: CPT

## 2025-03-19 PROCEDURE — 80048 BASIC METABOLIC PNL TOTAL CA: CPT

## 2025-03-19 RX ORDER — PREDNISONE 10 MG/1
10 TABLET ORAL DAILY
Status: DISCONTINUED | OUTPATIENT
Start: 2025-03-20 | End: 2025-04-02

## 2025-03-19 RX ORDER — FUROSEMIDE 10 MG/ML
80 INJECTION INTRAMUSCULAR; INTRAVENOUS DAILY
Status: DISCONTINUED | OUTPATIENT
Start: 2025-03-20 | End: 2025-03-24

## 2025-03-19 RX ADMIN — IPRATROPIUM BROMIDE 0.5 MG: 0.5 SOLUTION RESPIRATORY (INHALATION) at 20:19

## 2025-03-19 RX ADMIN — CHLORHEXIDINE GLUCONATE 15 ML: 1.2 SOLUTION ORAL at 08:28

## 2025-03-19 RX ADMIN — BUDESONIDE 0.5 MG: 0.5 INHALANT RESPIRATORY (INHALATION) at 20:19

## 2025-03-19 RX ADMIN — LEVALBUTEROL HYDROCHLORIDE 1.25 MG: 1.25 SOLUTION RESPIRATORY (INHALATION) at 07:04

## 2025-03-19 RX ADMIN — ASPIRIN 81 MG CHEWABLE TABLET 81 MG: 81 TABLET CHEWABLE at 08:27

## 2025-03-19 RX ADMIN — IPRATROPIUM BROMIDE 0.5 MG: 0.5 SOLUTION RESPIRATORY (INHALATION) at 07:04

## 2025-03-19 RX ADMIN — BENZONATATE 200 MG: 100 CAPSULE ORAL at 08:27

## 2025-03-19 RX ADMIN — FUROSEMIDE 80 MG: 10 INJECTION, SOLUTION INTRAMUSCULAR; INTRAVENOUS at 08:49

## 2025-03-19 RX ADMIN — HEPARIN SODIUM 5000 UNITS: 5000 INJECTION INTRAVENOUS; SUBCUTANEOUS at 21:15

## 2025-03-19 RX ADMIN — IPRATROPIUM BROMIDE 0.5 MG: 0.5 SOLUTION RESPIRATORY (INHALATION) at 14:18

## 2025-03-19 RX ADMIN — FORMOTEROL FUMARATE DIHYDRATE 20 MCG: 20 SOLUTION RESPIRATORY (INHALATION) at 20:19

## 2025-03-19 RX ADMIN — LEVALBUTEROL HYDROCHLORIDE 1.25 MG: 1.25 SOLUTION RESPIRATORY (INHALATION) at 14:18

## 2025-03-19 RX ADMIN — DOCUSATE SODIUM 100 MG: 100 CAPSULE, LIQUID FILLED ORAL at 17:24

## 2025-03-19 RX ADMIN — BUDESONIDE 0.5 MG: 0.5 INHALANT RESPIRATORY (INHALATION) at 07:04

## 2025-03-19 RX ADMIN — CHLORHEXIDINE GLUCONATE 15 ML: 1.2 SOLUTION ORAL at 21:15

## 2025-03-19 RX ADMIN — POLYETHYLENE GLYCOL 3350 17 G: 17 POWDER, FOR SOLUTION ORAL at 08:28

## 2025-03-19 RX ADMIN — PREDNISONE 40 MG: 20 TABLET ORAL at 08:28

## 2025-03-19 RX ADMIN — POTASSIUM PHOSPHATE, MONOBASIC POTASSIUM PHOSPHATE, DIBASIC 21 MMOL: 224; 236 INJECTION, SOLUTION, CONCENTRATE INTRAVENOUS at 08:57

## 2025-03-19 RX ADMIN — FORMOTEROL FUMARATE DIHYDRATE 20 MCG: 20 SOLUTION RESPIRATORY (INHALATION) at 07:04

## 2025-03-19 RX ADMIN — PRAVASTATIN SODIUM 80 MG: 80 TABLET ORAL at 17:11

## 2025-03-19 RX ADMIN — HEPARIN SODIUM 5000 UNITS: 5000 INJECTION INTRAVENOUS; SUBCUTANEOUS at 14:13

## 2025-03-19 RX ADMIN — HEPARIN SODIUM 5000 UNITS: 5000 INJECTION INTRAVENOUS; SUBCUTANEOUS at 05:29

## 2025-03-19 RX ADMIN — DOCUSATE SODIUM 100 MG: 100 CAPSULE, LIQUID FILLED ORAL at 08:28

## 2025-03-19 RX ADMIN — LEVALBUTEROL HYDROCHLORIDE 1.25 MG: 1.25 SOLUTION RESPIRATORY (INHALATION) at 20:19

## 2025-03-19 NOTE — MALNUTRITION/BMI
This medical record reflects one or more clinical indicators suggestive of malnutrition.    Malnutrition Findings:   Adult Malnutrition type: Chronic illness  Adult Degree of Malnutrition: Other severe protein calorie malnutrition  Malnutrition Characteristics: Fat loss, Muscle loss                360 Statement: severe malnutrition r/t chronic illness as evidenced by severe muscle loss around clavicles and shoulders, moderate-severe muscle loss in temples, severe buccal fat pad loss, apparent depression between ribs suggesting severe fat loss. Treatment: oral diet and oral nutrition supplements    BMI Findings:           Body mass index is 18.91 kg/m².     See Nutrition note dated 3/19/25 for additional details.  Completed nutrition assessment is viewable in the nutrition documentation.

## 2025-03-19 NOTE — PHYSICAL THERAPY NOTE
PT Cancellation:     03/19/25 1102   PT Last Visit   PT Visit Date 03/19/25   Note Type   Note type Evaluation;Cancelled Session   Cancel Reasons Medical status   Additional Comments on Bipap and not medically appropriate for participation in skilled PT eval at this time. will continue to follow.

## 2025-03-19 NOTE — WOUND OSTOMY CARE
Consult Note - Wound   Natalio Hartmann Jr. 69 y.o. male MRN: [id number]  Unit/Bed#: MICU 13 Encounter: [id number]        History and Present Illness:  Patient is a 68 yo male that was admitted to [facility] for treatment of acute on chronic respiratory failure with hypoxia and hypercapnia. Patient has a PMH of COPD, chronic hypoxic and hypercapnic respiratory failure on 4 L supplemental oxygen, cachexia, nonischemic cardiomyopathy, chronic systolic CHF. Patient is a mod assist for turning and repositioning. Patient is incontinent of bowel and bladder. On assessment, patient is seen lying on critical care low air loss mattress.  On continuous BIPAP.    Wound Care was consulted for wound on bridge of nose.      Assessment Findings:   B/L heels and sacro-buttocks are dry intact and hernán with no skin loss or wounds present. Recommend preventative Hydraguard Cream and proper offloading/ repositioning.      HA Bridge of Nose Evolving DTPI: irregular in shape area of partial thickness skin loss located on bridge of nose from BIPAP mask. Wound bed with mix of 10% dark purple non-blanchable tissue and 90% pink tissue. Dilcia-wound is fragile. Scant serosanguineous drainage noted. Recommend an application of a hydrocolloid dressing.     No induration, fluctuance, odor, warmth/temperature differences, redness, or purulence noted to the above noted wounds and skin areas assessed. New dressings applied per orders listed below. Patient tolerated well- no s/s of non-verbal pain or discomfort observed during the encounter. Bedside nurse aware of plan of care. See flow sheets for more detailed assessment findings.      Orders listed below and wound care will continue to follow, call or Secure Chat Message with questions.     Skin Care Plan:  1-Anterior Bridge of Nose Wound: cleanse with NSS and pat dry. Apply a hydrocolloid dressing to wound bed. Nicolás with T for Treatment and change every other day or PRN.   2-Turn/reposition q2h or  when medically stable for pressure re-distribution on skin.  3-Elevate heels to offload pressure.  4-Moisturize skin daily with skin nourishing cream  5-Ehob cushion in chair when out of bed.  6-Preventative Hydraguard to bilateral sacro-buttocks and heels BID and PRN.       Wounds:  Wound 03/18/25 Pressure Injury Nose (Active)   Wound Image   03/19/25 1125   Wound Description Non-blanchable erythema;Light purple;Pink 03/19/25 1500   Pressure Injury Stage DTPI 03/19/25 1500   Non-staged Wound Description Partial thickness 03/19/25 1500   Wound Length (cm) 1 cm 03/19/25 1125   Wound Width (cm) 1.5 cm 03/19/25 1125   Wound Depth (cm) 0.1 cm 03/19/25 1125   Wound Surface Area (cm^2) 1.5 cm^2 03/19/25 1125   Wound Volume (cm^3) 0.15 cm^3 03/19/25 1125   Calculated Wound Volume (cm^3) 0.15 cm^3 03/19/25 1125   Drainage Amount Scant 03/19/25 1500   Drainage Description Serosanguineous 03/19/25 1500   Dilcia-wound Assessment Fragile;Pink 03/19/25 1500   Treatments Cleansed;Irrigation with NSS;Site care 03/19/25 1500   Dressing Hydrocolloid 03/19/25 1500   Dressing Changed Changed 03/19/25 1500   Patient Tolerance Tolerated well 03/19/25 1500   Dressing Status Clean;Intact;Dry 03/19/25 1500               Malia Mcnulty RN, BSN, CWOCN

## 2025-03-19 NOTE — PROGRESS NOTES
Heart Failure/ Pulmonary Hypertension Progress Note - Natalio aHrtmann Jr. 69 y.o. male MRN: [id number]    Unit/Bed#: MICU 13 Encounter: [id number]      Assessment:    Principal Problem:    Acute on chronic respiratory failure with hypoxia and hypercapnia (HCC)  Active Problems:    Obstructive sleep apnea    Acute on chronic systolic congestive heart failure (HCC)    Goals of care, counseling/discussion    SIRS (systemic inflammatory response syndrome)    COPD exacerbation (HCC)    Hyperlipidemia    Malnutrition (HCC)    Metabolic encephalopathy    Severe protein-calorie malnutrition (HCC)      Subjective:   Natalio Hartmann Jr. is a 69 y.o. male with a PMH of severe COPD, chronic hypoxic and hypercapnic respiratory failure on 4 L supplemental oxygen, cachexia, nonischemic cardiomyopathy, HFrEF/Stage C/D who presents to the ER with worsening shortness of breath,  increasingly lethargic feeling, and fatigue.  On day of admit, he was visibly short of breath slumped in the chair noted by his wife and brought to the ER.       In the ED he is noted to be hypoxic and hypercapnic requiring BiPAP. BNP on admit>1000.  CXR shows pulmonary congestion. IV Lasix started in the ER.  Unable to wean off Bipap--admitted to MICU.      Patient seen and examined.  No significant events overnight.     Objective:   Intake/ Output: 1184/2875/-1.7L  Weight: bed weights  Tele: SR/ST, NSVT  MAPs: 75-80 mmHg    Acute on chronic HFrEF; LVEF 20-25%; NYHA III; ACC/AHA Stage C/D  -Etiology: Nonischemic cardiomyopathy.  Possible dyssynchrony from chronic LBBB.  Despite QRS only being 120 ms, echo shows significant dyssynchrony.  -warm on exam, hemodynamically stable. He is cachectic with end stage COPD. Difficult to assess JVP given significant respiratory variation  -TTE findings suggestive of volume overload state  -replace K. Continue diuresis with IV Lasix. Would change BP hold parameters to ensure dose administration.   --ongoing discussions with  patient and family regarding the appropriateness of palliative care involvement. They continue to refuse.      TTE 3/16/25:LVEF 25%, RV dilated, atria severely dilated, mild TR, est PASP 42 mmHg, IVC dilated, est RAP 15 mmHg   TTE 9/6/24: LVEF 25%  TTE 5/1/23: LVEF 20%. Severe global hypokinesis with regional variation. RV cavity size normal, systolic function normal. Trace MR, TR.   TTE 2/21/23: LVEF 30%. LVIDd 6.6cm. severe global hypokinesis. Grade I DD. RV cavity size and systolic function normal. Mild TR.   TTE 2/12/2023: LVEF 20-25%.  Severe global hypokinesis with regional variation.  Grade 1 DD.  Abnormal septal motion consistent with LBBB.  RV cavity mildly dilated, normal systolic function.  Mild MR, mild TR.  RVSP 38.  Dilated IVC.  TTE 8/26/2022: LVEF 40%.  RV cavity mildly dilated.  Systolic function normal.  Mild MR, TR.  Normal IVC.     Neurohormonal Blockade: GDMT limited by hypotension  --Beta Blocker: no  --ARNi / ACEi / ARB: losartan 12.5 mg QD, off   --Aldosterone Antagonist: no   --SGLT2 Inhibitor: no  --Home Diuretic: torsemide 40 mg QD alternating with 20 mg QD  --Inpatient diuretic : Lasix 80 mg IV BID, Diamox 250 mg IV      Sudden Cardiac Death Risk Reduction:  --ICD: LVEF 20%.      Electrical Resynchronization:  --Candidacy for BiV device: QRSd 120ms, chronic LBBB with dyssynchrony on echocardiogram.     Advanced Therapies (if appropriate): Not appropriate at this time, particularly considering advanced lung disease.        Acute on Chronic Respiratory Failure with hypoxia and hypercapnia   -Required BiPAP in the ER--pCO2 on admit 140  -Multifactorial with COPD and CHF exacerbation contributing  -O2 requirements coming down.   -IV steroids, IV Lasix per CC team.     COPD (end stage)  Former smoker; 105 pack years, quit in 2012.  Severe disease,   On home oxygen.  Multiple hospitalizations with acute COPD exacerbations   Follows with pulm    H/O COVID-19    Nonobstructive CAD  Has coronary  "calcium on CT  Previously on Rosuvastatin 10 mg. Notes indicate ASA, but not listed in EMR    Hyperlipidemia        Lab Results   Component Value Date     LDLCALC 75 08/19/2021    Continue statin    KEVIN   On BiPAP nightly    GOC.    -palliative care discussed. He is not interested       Review of Systems   All other systems reviewed and are negative.       Central Line (day, reason):  Stanley catheter (day, reason):    Vitals: Blood pressure 94/66, pulse 62, temperature 97.6 °F (36.4 °C), temperature source Oral, resp. rate (!) 26, height 5' 2\" (1.575 m), weight 46.9 kg (103 lb 6.3 oz), SpO2 98%., Body mass index is 18.91 kg/m²., I/O last 3 completed shifts:  In: 1184.2 [P.O.:930; IV Piggyback:254.2]  Out: 3375 [Urine:3375]  I/O this shift:  In: -   Out: 175 [Urine:175]  Wt Readings from Last 3 Encounters:   03/19/25 46.9 kg (103 lb 6.3 oz)   02/21/25 44.3 kg (97 lb 11.2 oz)   09/06/24 44.9 kg (99 lb)       Intake/Output Summary (Last 24 hours) at 3/19/2025 0903  Last data filed at 3/19/2025 0859  Gross per 24 hour   Intake 1184.17 ml   Output 2950 ml   Net -1765.83 ml     I/O last 3 completed shifts:  In: 1184.2 [P.O.:930; IV Piggyback:254.2]  Out: 3375 [Urine:3375]        Physical Exam:  Vitals:    03/19/25 0600 03/19/25 0700 03/19/25 0704 03/19/25 0800   BP: 99/69 94/60  94/66   BP Location:    Left arm   Pulse: 69 61  62   Resp: (!) 26 (!) 30  (!) 26   Temp:    97.6 °F (36.4 °C)   TempSrc:    Oral   SpO2: 98% 98% 98% 98%   Weight:       Height:           GEN: Natalio Hartmann Jr. Is ill appearing,cachectic,  tachypneic,   HEENT: pupils equal, round, and reactive to light; extraocular muscles intact  NECK: supple, no carotid bruits   HEART: regular rhythm, normal S1 and S2, no murmurs, clicks, gallops or rubs, JVP is  elevated  LUNGS:diminished, coarse througout  ABDOMEN: normal bowel sounds, soft, no tenderness, no distention  EXTREMITIES: peripheral pulses normal; no clubbing, cyanosis, or edema  NEURO: no focal " findings   SKIN: normal without suspicious lesions on exposed skin      Current Facility-Administered Medications:     acetaminophen (TYLENOL) tablet 650 mg, 650 mg, Oral, Q6H PRN, Sadie Sung MD    aluminum-magnesium hydroxide-simethicone (MAALOX) oral suspension 30 mL, 30 mL, Oral, Q6H PRN, Sadie Sung MD    aspirin chewable tablet 81 mg, 81 mg, Oral, Daily, DELIA Bowens, 81 mg at 03/19/25 0827    benzonatate (TESSALON PERLES) capsule 200 mg, 200 mg, Oral, TID, Sadie Sung MD, 200 mg at 03/19/25 0827    budesonide (PULMICORT) inhalation solution 0.5 mg, 0.5 mg, Nebulization, Q12H, Sadie Sung MD, 0.5 mg at 03/19/25 0704    chlorhexidine (PERIDEX) 0.12 % oral rinse 15 mL, 15 mL, Mouth/Throat, Q12H [facility] Health[facility]n DO, 15 mL at 03/19/25 0828    dextromethorphan-guaiFENesin (ROBITUSSIN DM) oral syrup 10 mL, 10 mL, Oral, Q4H PRN, Sadie Sung MD    docusate sodium (COLACE) capsule 100 mg, 100 mg, Oral, BID, Sadie Sung MD, 100 mg at 03/19/25 0828    formoterol (PERFOROMIST) nebulizer solution 20 mcg, 20 mcg, Nebulization, Q12H, Yasmin Bennett MD, 20 mcg at 03/19/25 0704    furosemide (LASIX) injection 80 mg, 80 mg, Intravenous, BID, Richelle Martines MD, 80 mg at 03/19/25 0849    heparin (porcine) subcutaneous injection 5,000 Units, 5,000 Units, Subcutaneous, Q8H Nguyễn BERNAL DO, 5,000 Units at 03/19/25 0529    ipratropium (ATROVENT) 0.02 % inhalation solution 0.5 mg, 0.5 mg, Nebulization, TID, Sadie Sung MD, 0.5 mg at 03/19/25 0704    levalbuterol (XOPENEX) inhalation solution 1.25 mg, 1.25 mg, Nebulization, TID, Sadie Sung MD, 1.25 mg at 03/19/25 0704    ondansetron (ZOFRAN) injection 4 mg, 4 mg, Intravenous, Q6H PRN, Sadie Sung MD    polyethylene glycol (MIRALAX) packet 17 g, 17 g, Oral, Daily, Sadie Sung MD, 17 g at 03/19/25 0828    potassium phosphates 21 mmol in sodium  chloride 0.9 % 250 mL infusion, 21 mmol, Intravenous, Once, Richelle Martines MD, Last Rate: 62.5 mL/hr at 03/19/25 0857, 21 mmol at 03/19/25 0857    pravastatin (PRAVACHOL) tablet 80 mg, 80 mg, Oral, Daily With Dinner, DELIA Bowens, 80 mg at 03/18/25 1704    predniSONE tablet 40 mg, 40 mg, Oral, Daily, Richelle Martines MD, 40 mg at 03/19/25 0828      Labs & Results:        Results from last 7 days   Lab Units 03/19/25  0529 03/18/25  0521 03/17/25  0502   WBC Thousand/uL 5.07 3.91* 4.17*   HEMOGLOBIN g/dL 10.8* 10.9* 10.5*   HEMATOCRIT % 35.7* 37.1 35.8*   PLATELETS Thousands/uL 157 160 173         Results from last 7 days   Lab Units 03/19/25  0529 03/18/25  0521 03/17/25  0502 03/16/25  0509   POTASSIUM mmol/L 3.4* 3.3* 3.7 3.9   CHLORIDE mmol/L 86* 89* 88* 90*   CO2 mmol/L >45* 45* >45* >45*   BUN mg/dL 49* 40* 44* 49*   CREATININE mg/dL 0.79 0.68 0.68 0.85   CALCIUM mg/dL 8.7 8.2* 8.5 8.2*   ALK PHOS U/L  --  44 50 51   ALT U/L  --  42 59* 64*   AST U/L  --  28 46* 63*     Results from last 7 days   Lab Units 03/14/25  1139   INR  0.84*         Bren LIN

## 2025-03-19 NOTE — DISCHARGE INSTR - OTHER ORDERS
Skin Care Plan:  1-Anterior Bridge of Nose Wound: If using smaller BIPAP mask without full face shield, please apply a hydrocolloid dressing to the bridge of the patient's nose for pressure relief. Change every pother day or PRN  2-Turn/reposition q2h or when medically stable for pressure re-distribution on skin.  3-Elevate heels to offload pressure.  4-Moisturize skin daily with skin nourishing cream  5-Ehob cushion in chair when out of bed.  6-Preventative Hydraguard to bilateral sacro-buttocks and heels BID and PRN.   7-B/L foot- Cleanse with soap and water, pat dry. Apply antifungal cream as ordered.

## 2025-03-19 NOTE — PROGRESS NOTES
Progress Note - Critical Care/ICU   Name: Natalio Hartmann Jr. 69 y.o. male I MRN: [id number]  Unit/Bed#: MICU 13 I Date of Admission: 3/14/2025   Date of Service: 3/19/2025 I Hospital Day: 5       Assessment & Plan   Active Hospital Problems    Diagnosis Date Noted POA    Acute on chronic respiratory failure with hypoxia and hypercapnia (HCC) 11/19/2020 Yes    Severe protein-calorie malnutrition (HCC) 10/14/2023 Yes    Metabolic encephalopathy 07/20/2023 Yes    Malnutrition (HCC) 06/10/2023 Yes    Hyperlipidemia 05/02/2023 Yes    COPD exacerbation (HCC) 04/28/2023 Yes    SIRS (systemic inflammatory response syndrome) 02/17/2023 Yes    Acute on chronic systolic congestive heart failure (HCC) 09/12/2022 Yes    Goals of care, counseling/discussion 02/25/2021 Not Applicable    Obstructive sleep apnea 07/29/2013 Yes      Resolved Hospital Problems   No resolved problems to display.       Neuro:   Diagnosis: Acute metabolic encephalopathy  Most likely 2/2 hypoxic hypercarbia   Resolved  Plan: CAM-ICU  Delirium precuations  Avoid neurotoxin  On bipap           CV:   Diagnosis: Acute on chronic HEFrEF  Nonischemic cardiomyopathy  BNP elevated 1079  Echo 03/16: EF 25% systolic function severely reduced severe global hypokinesis LA moderately dilated mild TR, no aortic stenosis, trace MR, mild TR normal RVSP, no pericardial effusion   Home diuretic regimen: Torsemide 20 mg alternating with 40 mg QOD  Outpatient cardiology notes review recommendation for palliative care   Plan:   IV Lasix 80 mg BID  Monitor U/O: Goal - 1L in 24 hours  Monitor daily weights  Monitor BMP and electrolytes   Cardiology reccs apreciated     Pulm:  Diagnosis: Acute on chronic respiratory failure with hypoxia and hypercarbia  Multifactorial with COPD and CHF exacerbation  Bipap required in the ED  VBG in ED: 7.1/ 146  NC at home baseline 4L   Pro-Jorge Alberto negative BNP pending.   X-ray reviewed cardiomegaly, pulmonary congestion noted  Plan:   S/p IV  azithromycin  Continue supplemental oxygen goal oxygen >88%  Bipap at night and as needed during day  Encourage incentive spirometry  Diuretics for CHF exacerbation and respiratory treatment for COPD  S/p Diamox 2 doses  PO Prednisone 40 mg         COPD exacerbation:  Vbg 7.1/146  Required Bipap in the ED   Plan:   Continue supplemental oxygen goal oxygen >88%  Bipap at night and as needed during day  Encourage incentive spirometry  respiratory treatment for COPD   PO Prednisone 40 mg         KEVIN:  On trilogy NIV at night  Bipap at night      GI:   Diagnosis: Severe protein-calorie malnutrition  Plan: Consult nutrition        Diagnosis: Elevated transaminitis most likely secondary to congestive hepatopathy  Improving  Plan: Monitor CMP     :   No active issues     F/E/N:   F: None  E: Replete electrolytes as needed   N: Dysphagia diet      Heme/Onc:   Diagnosis: Macrocytic anemia, chronic  Baseline around 9ish    Most likely nutritional deficiency  Plan: Monitor Hgb and transfuse <7  Continue DVT ppx with Lovenox 40 mg sq.     Endo:   No active issues     ID:   Diagnosis: SIRS Criteria  Met SIRS criteria upon admission   Sepsis workup done admission  Procal 0.4  B cx: negative 5 days  S/p Empiric treatment with ceftraixone for PNA  Plan:   Continue CTX for PNA       MSK/Skin:   No active issues  Disposition: Stepdown Level 2    ICU Core Measures     A: Assess, Prevent, and Manage Pain Has pain been assessed? NA  Need for changes to pain regimen? NA   B: Both SAT/SAT  N/A   C: Choice of Sedation RASS Goal: 0 Alert and Calm or N/A patient not on sedation  Need for changes to sedation or analgesia regimen? NA   D: Delirium CAM-ICU: Negative   E: Early Mobility  Plan for early mobility? NA   F: Family Engagement Plan for family engagement today? No         Prophylaxis:  VTE VTE covered by:  heparin (porcine), Subcutaneous, 5,000 Units at 03/19/25 0529       Stress Ulcer  not ordered         24 Hour Events : No  events overnight  Subjective   Review of Systems: Review of Systems   Constitutional:  Negative for chills, fatigue and fever.   HENT:  Negative for ear discharge, ear pain, sneezing and sore throat.    Eyes:  Negative for visual disturbance.   Respiratory:  Negative for apnea, cough, choking, chest tightness, shortness of breath, wheezing and stridor.    Cardiovascular:  Negative for chest pain, palpitations and leg swelling.   Gastrointestinal:  Negative for abdominal pain.   Neurological:  Negative for headaches.   Reason cannot obtain ROS:[id number]}    Objective :                   Vitals I/O      Most Recent Min/Max in 24hrs   Temp 97.9 °F (36.6 °C) Temp  Min: 97.8 °F (36.6 °C)  Max: 98.3 °F (36.8 °C)   Pulse 69 Pulse  Min: 61  Max: 96   Resp (!) 26 Resp  Min: 18  Max: 32   BP 99/69 BP  Min: 87/59  Max: 154/70   O2 Sat 98 % SpO2  Min: 81 %  Max: 99 %      Intake/Output Summary (Last 24 hours) at 3/19/2025 0736  Last data filed at 3/19/2025 0601  Gross per 24 hour   Intake 1184.17 ml   Output 2875 ml   Net -1690.83 ml       Diet Dysphagia/Modified Consistency; Dysphagia 2-Mechanical Soft; Thin Liquid    Invasive Monitoring           Physical Exam   Physical Exam  Vitals reviewed.   Eyes:      Pupils: Pupils are equal, round, and reactive to light.   Skin:     General: Skin is warm.   HENT:      Head: Normocephalic and atraumatic.      Mouth/Throat:      Mouth: Mucous membranes are moist.   Cardiovascular:      Rate and Rhythm: Normal rate and regular rhythm.      Pulses: Normal pulses.   Abdominal:      Palpations: Abdomen is soft.   Constitutional:       Comments: cachectic   Pulmonary:      Effort: Pulmonary effort is normal.      Breath sounds: Normal breath sounds.   Neurological:      Mental Status: He is oriented to person, place and time.          Diagnostic Studies        Lab Results: I have reviewed the following results:     Medications:  Scheduled PRN   aspirin, 81 mg, Daily  benzonatate, 200 mg,  TID  budesonide, 0.5 mg, Q12H  chlorhexidine, 15 mL, Q12H GABE  docusate sodium, 100 mg, BID  formoterol, 20 mcg, Q12H  furosemide, 80 mg, BID  heparin (porcine), 5,000 Units, Q8H GABE  ipratropium, 0.5 mg, TID  levalbuterol, 1.25 mg, TID  polyethylene glycol, 17 g, Daily  pravastatin, 80 mg, Daily With Dinner  predniSONE, 40 mg, Daily      acetaminophen, 650 mg, Q6H PRN  aluminum-magnesium hydroxide-simethicone, 30 mL, Q6H PRN  dextromethorphan-guaiFENesin, 10 mL, Q4H PRN  ondansetron, 4 mg, Q6H PRN       Continuous          Labs:   CBC    Recent Labs     03/18/25 0521 03/19/25  0529   WBC 3.91* 5.07   HGB 10.9* 10.8*   HCT 37.1 35.7*    157     BMP    Recent Labs     03/18/25 0521 03/19/25  0529   SODIUM 137 139   K 3.3* 3.4*   CL 89* 86*   CO2 45* >45*   AGAP 3*  --    BUN 40* 49*   CREATININE 0.68 0.79   CALCIUM 8.2* 8.7       Coags    No recent results     Additional Electrolytes  Recent Labs     03/18/25 0521 03/19/25  0529   MG 2.1 2.1   PHOS 3.0 1.8*          Blood Gas    No recent results  Recent Labs     03/19/25  0529   PHVEN 7.381   OCY3CHL 82.5*   PO2VEN 30.1*   QEV7BEA 47.8*   BEVEN 18.8   A9LVTFC 53.6*    LFTs  Recent Labs     03/18/25  0521   ALT 42   AST 28   ALKPHOS 44   ALB 3.4*   TBILI 0.42       Infectious  No recent results  Glucose  Recent Labs     03/18/25 0521 03/19/25  0529   GLUC 153* 107

## 2025-03-19 NOTE — CASE MANAGEMENT
Case Management Discharge Planning Note    Patient name Natalio Hartmann Jr.  Location MICU 13/MICU 13 MRN [id number]  : 1955 Date 3/19/2025       Current Admission Date: 3/14/2025  Current Admission Diagnosis:Acute on chronic respiratory failure with hypoxia and hypercapnia ([facility])   Patient Active Problem List    Diagnosis Date Noted Date Diagnosed    Iron deficiency anemia secondary to inadequate dietary iron intake 2024     SIADH (syndrome of inappropriate ADH production) ([facility]) 2024     Type 2 diabetes mellitus without complication, with long-term current use of insulin ([facility]) 2024     End stage COPD ([facility]) 2024     Dependence on respirator (ventilator) status ([facility]) 01/10/2024     Severe protein-calorie malnutrition ([facility]) 10/14/2023     History of bladder cancer 2023     Pulmonary cachexia due to COPD ([facility])      Chronic hypercapnia/KEVIN 2023     Metabolic encephalopathy 2023     Palliative care patient 2023     Alkalosis 2023     Malnutrition ([facility]) 06/10/2023     Hyperlipidemia 2023     COPD exacerbation ([facility]) 2023     Steroid-induced hyperglycemia 2023     Physical deconditioning 2023     Chronic anemia 2023     SIRS (systemic inflammatory response syndrome) 2023     Hyponatremia 2023     Acute on chronic systolic congestive heart failure ([facility]) 2022     Pulmonary nodules/lesions, multiple 2022     Prostate cancer ([facility]) 2021     BPH with obstruction/lower urinary tract symptoms 2021     Goals of care, counseling/discussion 2021     Acute on chronic respiratory failure with hypoxia and hypercapnia ([facility]) 2020     Macrocytosis without anemia 2019     Other insomnia 2019     GERD (gastroesophageal reflux disease) 2017     Nonobstructive atherosclerosis of coronary artery 2016     Mood disorder ([facility]) 2015     Prediabetes 2015     Osteoporosis  10/14/2014     Vitamin D deficiency 10/14/2014     Nonischemic cardiomyopathy (HCC) 09/26/2014     Left bundle-branch block 08/03/2013     Osteoarthritis 08/03/2013     Obstructive sleep apnea 07/29/2013       LOS (days): 5  Geometric Mean LOS (GMLOS) (days):   Days to GMLOS:     OBJECTIVE:  Risk of Unplanned Readmission Score: 21.67         Current admission status: Inpatient   Preferred Pharmacy:   [id number] - [place], [place] - [address]  [place] [address]  Phone: [phone] Fax: [phone]    Primary Care Provider: Fatmata Gustafson DO    Primary Insurance: BLUE CROSS  Secondary Insurance: MEDICARE    DISCHARGE DETAILS:                                                                                                 Additional Comments: CM reviewed patient, he is sleeping, on bipap now at 40% with face mask, alternates with O2@5L nc. On Lasix 80mg IV bid. CHF, EF 25%. Now off antibiotics. On po prednisone.  No family here at this time, CM following for discharge needs and available as needed. STR vs HHC.

## 2025-03-19 NOTE — OCCUPATIONAL THERAPY NOTE
OT CANCEL NOTE    OT orders received. Chart reviewed. Pt is on bipap and not appropriate to engage in skilled OT services at this time. Will hold initial OT evaluation. Will continue to follow pt on caseload and see pt when medically stable and as clinically appropriate.       03/19/25 1105   OT Last Visit   OT Visit Date 03/19/25   Note Type   Note type Evaluation;Cancelled Session   Cancel Reasons Medical status       Glenna Garay MS, OTR/L

## 2025-03-20 LAB
ANION GAP SERPL CALCULATED.3IONS-SCNC: 4 MMOL/L (ref 4–13)
BASOPHILS # BLD AUTO: 0 THOUSANDS/ÂΜL (ref 0–0.1)
BASOPHILS NFR BLD AUTO: 0 % (ref 0–1)
BUN SERPL-MCNC: 36 MG/DL (ref 5–25)
BUN SERPL-MCNC: 42 MG/DL (ref 5–25)
CALCIUM SERPL-MCNC: 8.3 MG/DL (ref 8.4–10.2)
CALCIUM SERPL-MCNC: 8.8 MG/DL (ref 8.4–10.2)
CHLORIDE SERPL-SCNC: 86 MMOL/L (ref 96–108)
CHLORIDE SERPL-SCNC: 91 MMOL/L (ref 96–108)
CO2 SERPL-SCNC: 43 MMOL/L (ref 21–32)
CO2 SERPL-SCNC: >45 MMOL/L (ref 21–32)
CREAT SERPL-MCNC: 0.55 MG/DL (ref 0.6–1.3)
CREAT SERPL-MCNC: 0.67 MG/DL (ref 0.6–1.3)
EOSINOPHIL # BLD AUTO: 0 THOUSAND/ÂΜL (ref 0–0.61)
EOSINOPHIL NFR BLD AUTO: 0 % (ref 0–6)
ERYTHROCYTE [DISTWIDTH] IN BLOOD BY AUTOMATED COUNT: 13.2 % (ref 11.6–15.1)
GFR SERPL CREATININE-BSD FRML MDRD: 107 ML/MIN/1.73SQ M
GFR SERPL CREATININE-BSD FRML MDRD: 99 ML/MIN/1.73SQ M
GLUCOSE SERPL-MCNC: 123 MG/DL (ref 65–140)
GLUCOSE SERPL-MCNC: 129 MG/DL (ref 65–140)
HCT VFR BLD AUTO: 36.3 % (ref 36.5–49.3)
HGB BLD-MCNC: 11.1 G/DL (ref 12–17)
IMM GRANULOCYTES # BLD AUTO: 0.02 THOUSAND/UL (ref 0–0.2)
IMM GRANULOCYTES NFR BLD AUTO: 0 % (ref 0–2)
LYMPHOCYTES # BLD AUTO: 0.55 THOUSANDS/ÂΜL (ref 0.6–4.47)
LYMPHOCYTES NFR BLD AUTO: 11 % (ref 14–44)
MAGNESIUM SERPL-MCNC: 2 MG/DL (ref 1.9–2.7)
MCH RBC QN AUTO: 30.4 PG (ref 26.8–34.3)
MCHC RBC AUTO-ENTMCNC: 30.6 G/DL (ref 31.4–37.4)
MCV RBC AUTO: 100 FL (ref 82–98)
MONOCYTES # BLD AUTO: 0.66 THOUSAND/ÂΜL (ref 0.17–1.22)
MONOCYTES NFR BLD AUTO: 13 % (ref 4–12)
NEUTROPHILS # BLD AUTO: 4.03 THOUSANDS/ÂΜL (ref 1.85–7.62)
NEUTS SEG NFR BLD AUTO: 76 % (ref 43–75)
NRBC BLD AUTO-RTO: 0 /100 WBCS
PHOSPHATE SERPL-MCNC: 1.9 MG/DL (ref 2.3–4.1)
PHOSPHATE SERPL-MCNC: 5.7 MG/DL (ref 2.3–4.1)
PLATELET # BLD AUTO: 157 THOUSANDS/UL (ref 149–390)
PMV BLD AUTO: 10.3 FL (ref 8.9–12.7)
POTASSIUM SERPL-SCNC: 3.3 MMOL/L (ref 3.5–5.3)
POTASSIUM SERPL-SCNC: 4.7 MMOL/L (ref 3.5–5.3)
RBC # BLD AUTO: 3.65 MILLION/UL (ref 3.88–5.62)
SODIUM SERPL-SCNC: 138 MMOL/L (ref 135–147)
SODIUM SERPL-SCNC: 138 MMOL/L (ref 135–147)
WBC # BLD AUTO: 5.26 THOUSAND/UL (ref 4.31–10.16)

## 2025-03-20 PROCEDURE — 94640 AIRWAY INHALATION TREATMENT: CPT

## 2025-03-20 PROCEDURE — 99232 SBSQ HOSP IP/OBS MODERATE 35: CPT | Performed by: INTERNAL MEDICINE

## 2025-03-20 PROCEDURE — 92526 ORAL FUNCTION THERAPY: CPT

## 2025-03-20 PROCEDURE — 85025 COMPLETE CBC W/AUTO DIFF WBC: CPT

## 2025-03-20 PROCEDURE — 80048 BASIC METABOLIC PNL TOTAL CA: CPT

## 2025-03-20 PROCEDURE — 94660 CPAP INITIATION&MGMT: CPT

## 2025-03-20 PROCEDURE — 97163 PT EVAL HIGH COMPLEX 45 MIN: CPT

## 2025-03-20 PROCEDURE — 94760 N-INVAS EAR/PLS OXIMETRY 1: CPT

## 2025-03-20 PROCEDURE — 83735 ASSAY OF MAGNESIUM: CPT

## 2025-03-20 PROCEDURE — 94664 DEMO&/EVAL PT USE INHALER: CPT

## 2025-03-20 PROCEDURE — NC001 PR NO CHARGE: Performed by: INTERNAL MEDICINE

## 2025-03-20 PROCEDURE — 84100 ASSAY OF PHOSPHORUS: CPT

## 2025-03-20 PROCEDURE — 97167 OT EVAL HIGH COMPLEX 60 MIN: CPT

## 2025-03-20 RX ADMIN — FUROSEMIDE 80 MG: 10 INJECTION, SOLUTION INTRAMUSCULAR; INTRAVENOUS at 09:37

## 2025-03-20 RX ADMIN — HEPARIN SODIUM 5000 UNITS: 5000 INJECTION INTRAVENOUS; SUBCUTANEOUS at 14:23

## 2025-03-20 RX ADMIN — IPRATROPIUM BROMIDE 0.5 MG: 0.5 SOLUTION RESPIRATORY (INHALATION) at 13:30

## 2025-03-20 RX ADMIN — DOCUSATE SODIUM 100 MG: 100 CAPSULE, LIQUID FILLED ORAL at 09:42

## 2025-03-20 RX ADMIN — CHLORHEXIDINE GLUCONATE 15 ML: 1.2 SOLUTION ORAL at 21:22

## 2025-03-20 RX ADMIN — CHLORHEXIDINE GLUCONATE 15 ML: 1.2 SOLUTION ORAL at 09:42

## 2025-03-20 RX ADMIN — HEPARIN SODIUM 5000 UNITS: 5000 INJECTION INTRAVENOUS; SUBCUTANEOUS at 21:22

## 2025-03-20 RX ADMIN — PREDNISONE 10 MG: 10 TABLET ORAL at 09:37

## 2025-03-20 RX ADMIN — POLYETHYLENE GLYCOL 3350 17 G: 17 POWDER, FOR SOLUTION ORAL at 09:42

## 2025-03-20 RX ADMIN — LEVALBUTEROL HYDROCHLORIDE 1.25 MG: 1.25 SOLUTION RESPIRATORY (INHALATION) at 19:10

## 2025-03-20 RX ADMIN — POTASSIUM PHOSPHATE, MONOBASIC POTASSIUM PHOSPHATE, DIBASIC 30 MMOL: 224; 236 INJECTION, SOLUTION, CONCENTRATE INTRAVENOUS at 07:04

## 2025-03-20 RX ADMIN — LEVALBUTEROL HYDROCHLORIDE 1.25 MG: 1.25 SOLUTION RESPIRATORY (INHALATION) at 13:29

## 2025-03-20 RX ADMIN — FORMOTEROL FUMARATE DIHYDRATE 20 MCG: 20 SOLUTION RESPIRATORY (INHALATION) at 07:14

## 2025-03-20 RX ADMIN — BUDESONIDE 0.5 MG: 0.5 INHALANT RESPIRATORY (INHALATION) at 07:14

## 2025-03-20 RX ADMIN — IPRATROPIUM BROMIDE 0.5 MG: 0.5 SOLUTION RESPIRATORY (INHALATION) at 19:10

## 2025-03-20 RX ADMIN — IPRATROPIUM BROMIDE 0.5 MG: 0.5 SOLUTION RESPIRATORY (INHALATION) at 07:14

## 2025-03-20 RX ADMIN — ASPIRIN 81 MG CHEWABLE TABLET 81 MG: 81 TABLET CHEWABLE at 09:38

## 2025-03-20 RX ADMIN — LEVALBUTEROL HYDROCHLORIDE 1.25 MG: 1.25 SOLUTION RESPIRATORY (INHALATION) at 07:14

## 2025-03-20 RX ADMIN — PRAVASTATIN SODIUM 80 MG: 80 TABLET ORAL at 18:12

## 2025-03-20 RX ADMIN — BUDESONIDE 0.5 MG: 0.5 INHALANT RESPIRATORY (INHALATION) at 19:10

## 2025-03-20 RX ADMIN — HEPARIN SODIUM 5000 UNITS: 5000 INJECTION INTRAVENOUS; SUBCUTANEOUS at 05:21

## 2025-03-20 NOTE — PHYSICAL THERAPY NOTE
PHYSICAL THERAPY EVALUATION  NAME:  Natalio Hartmann Jr.  DATE: 03/20/25    AGE:   69 y.o.  Mrn:   [id number]  ADMIT DX:  SOB (shortness of breath) [R06.02]  COPD exacerbation (HCC) [J44.1]  Acute on chronic systolic congestive heart failure (HCC) [I50.23]  Acute respiratory failure with hypoxia and hypercapnia (HCC) [J96.01, J96.02]    Past Medical History:   Diagnosis Date    Acute metabolic encephalopathy 03/29/2022    Arthritis     Bladder mass     Cardiomyopathy (HCC)     Chest pain     COPD (chronic obstructive pulmonary disease) (HCC)     COVID-19 virus infection 02/23/2023    CPAP (continuous positive airway pressure) dependence     Emphysema of lung (HCC)     Hypoxia     nocturnal    Left bundle branch block     Multiple pulmonary nodules     last assessed: 10/12/16    Pneumonia     Sleep apnea, obstructive     Smoker     Weight loss 08/29/2019     Past Surgical History:   Procedure Laterality Date    COLONOSCOPY      CYSTOSCOPY      CYSTOSCOPY  02/17/2021    KY BLADDER INSTILLATION ANTICARCINOGENIC AGENT N/A 1/3/2020    Procedure: INSTILLATION MITOMYCIN;  Surgeon: Sundeep Canchola MD;  [facility];  Service: Urology    KY CYSTO W/REMOVAL OF LESIONS SMALL N/A 1/3/2020    Procedure: TRANSURETHRAL RESECTION OF BLADDER TUMOR (TURBT);  Surgeon: Sundeep Canchola MD;  [facility];  Service: Urology    KY CYSTOURETHROSCOPY WITH BIOPSY N/A 4/13/2021    Procedure: CYSTOSCOPY WITH BIOPSIES;  Surgeon: Sundeep Canchola MD;  [facility];  Service: Urology    KY TRURL ELECTROSURG RESCJ PROSTATE BLEED COMPLETE N/A 4/13/2021    Procedure: TRANSURETHRAL RESECTION OF PROSTATE (TURP) BLADDER BIOPSY, FULGURATION;  Surgeon: Sundeep Canchola MD;  [facility];  Service: Urology       Length Of Stay: 6  PHYSICAL THERAPY EVALUATION :    03/20/25 1138   PT Last Visit   PT Visit Date 03/20/25   Note Type   Note type Evaluation   Pain Assessment   Pain Assessment Tool 0-10   Pain Score No Pain  "  Restrictions/Precautions   Weight Bearing Precautions Per Order No   Braces or Orthoses   (none)   Other Precautions Chair Alarm;Bed Alarm;Multiple lines;Telemetry;O2;Fall Risk  (5Lo2; tyler)   Home Living   Type of Home House   Home Layout One level;Stairs to enter with rails  (3STE)   Bathroom Equipment Grab bars in shower;Shower chair   Home Equipment Walker;Wheelchair-manual  (O2)   Prior Function   Level of [place] Needs assistance with IADLS;Independent with functional mobility   Lives With Spouse;Son   Receives Help From Family   IADLs Family/Friend/Other provides transportation;Family/Friend/Other provides meals;Family/Friend/Other provides medication management   Falls in the last 6 months 0   Comments At baseline, pt reports living w/ spouse and adult son [place] home w/ 3 CARLO; pt is on 4-5L home O2 and walks limited distances w/ RW at baseline; is indep w/ ADLs and has family assist w/ IADLs ; 0 drive; is alone at times while spouse and son work. .   General   Family/Caregiver Present No   Cognition   Overall Cognitive Status WFL   Arousal/Participation Arousable   Orientation Level Oriented X4   Memory Decreased recall of precautions;Decreased recall of recent events   Following Commands Follows one step commands without difficulty   Comments overall pleasant and cooperative- agreeable to PT ; inc time for processing and safety. Limited insight   Subjective   Subjective \"I just want to breathe better.\"   RLE Assessment   RLE Assessment X  (gross generalized weakness throughout at least 3+5 noted w/ functional mobility)   LLE Assessment   LLE Assessment X  (gross generalized weakness throughout at least 3+5 noted w/ functional mobility)   Coordination   Movements are Fluid and Coordinated 1   Sensation WFL   Light Touch   RLE Light Touch Grossly intact   LLE Light Touch Grossly intact   Sharp/Dull   RLE Sharp/Dull Grossly intact   LLE Sharp/Dull Grossly intact   Proprioception   RLE " Proprioception Grossly intact   LLE Proprioception Grossly Intact   Bed Mobility   Supine to Sit 4  Minimal assistance   Additional items Assist x 1;Increased time required;Verbal cues;LE management   Additional Comments pt sits EOB w/ F balance; endorses SOB on 5Lo2- Spo2 > 91% w/ mobility. 0 dizziness   Transfers   Sit to Stand 4  Minimal assistance   Additional items Increased time required;Verbal cues   Stand to Sit 4  Minimal assistance   Additional items Assist x 1;Increased time required;Verbal cues   Additional Comments w/ HHA   Ambulation/Elevation   Gait pattern Foward flexed;Shuffling;Decreased foot clearance;Forward Flexion;Improper Weight shift   Gait Assistance 4  Minimal assist   Additional items Assist x 1;Verbal cues;Tactile cues   Assistive Device None  (HHA)   Distance 4-5 steps to chair on 5Lo2 w/ further mobility limited by SOB/ SHARIF (without drop in Spo2).   Stair Management Assistance Not tested   Balance   Static Sitting Fair   Dynamic Sitting Fair -   Static Standing Poor +   Dynamic Standing Poor +   Ambulatory Poor  (HHA)   Activity Tolerance   Activity Tolerance Patient limited by fatigue   Medical Staff Made Aware OT and RN + CM for d/c planning   Nurse Made Aware yes   Assessment  Pt is 69 y.o. male seen for PT evaluation s/p admit to [facility] on 3/14/2025  w dx of Acute on chronic respiratory failure with hypoxia and hypercapnia (HCC); CHF; possible bacterial pneumonia; Hypophosphatemia/refeeding syndrome.   PT consulted for mobility and to assist w/ d/c planning recommendations.      Pt  has a past medical history of Acute metabolic encephalopathy (03/29/2022), Arthritis, Bladder mass, Cardiomyopathy ([facility]), Chest pain, COPD (chronic obstructive pulmonary disease) ([facility]), COVID-19 virus infection (02/23/2023), CPAP (continuous positive airway pressure) dependence, Emphysema of lung ([facility]), Hypoxia, Left bundle branch block, Multiple pulmonary nodules, Pneumonia, Sleep apnea,  obstructive, Smoker, and Weight loss (08/29/2019).  Personal factors affecting pt at time of IE include: steps to enter environment, limited home support,  past experience, inability to perform IADLs, inability to perform ADLs, inability to ambulate household distances, limited insight into impairments.   Due to acute medical issues, ongoing medical management for or primary dx; pain, fall risk, SOB SHARIF w/ minimal activity; increased reliance on more restrictive AD compared to baseline;  decreased activity tolerance compared to baseline, increased assistance needed from caregiver at current time, continuous monitoring, trending labs;  multiple lines, decline in overall functional mobility status; health management issues; note unstable clinical picture (high complexity).      At baseline, pt reports living w/ spouse and adult son [place] w/ 3 CAR[place]; pt is on 4-5L home O2 and walks limited distances w/ RW at baseline; is indep w/ ADLs and has family assist w/ IADLs ; 0 drive; is alone at times while spouse and son work. . Currently,  pt  is requiring Stephen A for bed skills; Stephen for functional transfers and Stephen for ambulation w/ HHA 4-5 steps to chair on 5Lo2 w/ further mobility limited by SOB/ SHARIF (without drop in Spo2).       Pt presents functioning below baseline and currently w/ overall mobility deficits 2* to: decreased LE strength/AROM; gross generalized weakness/ deconditioning; decreased endurance; decreased activity tolerance; impaired balance; gait deviations; decreased safety awareness; SOB/SHARIF; fatigue; impaired safety and judgement; limited insight into current deficits; bed/ chair alarms; multiple lines; frail;  Pt currently at risk for falls.  (Please find additional objective findings from PT assessment regarding body systems outlined above.)     Pt will continue to benefit from skilled PT interventions to address stated impairments; to maximize functional potential; for ongoing pt/ family  training; and DME needs.  PT is recommending PT Resource Intensity Level  2 (rehab)  on d/c.      PT will follow for STG as outlined on eval.    Prognosis Fair   Problem List Decreased strength;Decreased endurance;Impaired balance;Decreased mobility;Decreased coordination;Decreased cognition;Impaired judgement;Decreased safety awareness   Barriers to Discharge Inaccessible home environment;Decreased caregiver support   Barriers to Discharge Comments CARLO home; alone at times   Goals   Patient Goals to breathe better   STG Expiration Date 04/03/25   Short Term Goal #1 In 14 days pt will:  Complete bed mobility skills with MI to facilitate safe return to previous living environment and decrease burden on caregivers.  Perform all functional transfers with MI  to facilitate safe return to previous living environment.    Ambulate  with RW 50'x2' w/ S + without LOB and stable vitals for safe ambulation in home/ community environment.   Complete stair training up/ down 3 step/s w/  S for safe access to previous living environment and to increase community access.    Improve balance by 1 grade in order to decrease fall risk.    Improve LE strength grades by 1 to increase independence w/ all functional mobility, transfers and gait.  Actively participate w/ PT for ongoing pt and family education; DME needs and D/C planning to promote highest level of function in least restrictive environment.   PT Treatment Day 0   Plan   Treatment/Interventions ADL retraining;Functional transfer training;LE strengthening/ROM;Elevations;Therapeutic exercise;Endurance training;Cognitive reorientation;Patient/family training;Equipment eval/education;Gait training;Bed mobility;Spoke to MD;Spoke to nursing;Spoke to case management;OT  (Pt was seen in conjunction w/ OT 2* unstable presentation; medical complexity; new precautions/ limitations + limited activity tolerance and regression from baseline functional mobility.)   PT Frequency 3-5x/wk    Discharge Recommendation   Rehab Resource Intensity Level, PT II (Moderate Resource Intensity)   Equipment Recommended Walker   Walker Package Recommended Wheeled walker   AM-PAC Basic Mobility Inpatient   Turning in Flat Bed Without Bedrails 3   Lying on Back to Sitting on Edge of Flat Bed Without Bedrails 3   Moving Bed to Chair 2   Standing Up From Chair Using Arms 2   Walk in Room 2   Climb 3-5 Stairs With Railing 2   Basic Mobility Inpatient Raw Score 14   Basic Mobility Standardized Score 35.55   [facility] Level Of Mobility   [facility] Goal 4: Move to chair/commode   -[facility] Achieved 5: Stand (1 or more minutes)   End of Consult   Patient Position at End of Consult Bedside chair;Bed/Chair alarm activated;All needs within reach     The patient's AM-PAC Basic Mobility Inpatient Short Form Raw Score is 14. A Raw score of less than or equal to 16 suggests the patient may benefit from discharge to post-acute rehabilitation services. Please also refer to the recommendation of the Physical Therapist for safe discharge planning.

## 2025-03-20 NOTE — CASE MANAGEMENT
Case Management Discharge Planning Note    Patient name Natalio Hartmann Jr.  Location MICU 13/MICU 13 MRN [id number]  : 1955 Date 3/20/2025       Current Admission Date: 3/14/2025  Current Admission Diagnosis:Acute on chronic respiratory failure with hypoxia and hypercapnia ([facility])   Patient Active Problem List    Diagnosis Date Noted Date Diagnosed    Iron deficiency anemia secondary to inadequate dietary iron intake 2024     SIADH (syndrome of inappropriate ADH production) ([facility]) 2024     Type 2 diabetes mellitus without complication, with long-term current use of insulin ([facility]) 2024     End stage COPD ([facility]) 2024     Dependence on respirator (ventilator) status ([facility]) 01/10/2024     Severe protein-calorie malnutrition ([facility]) 10/14/2023     History of bladder cancer 2023     Pulmonary cachexia due to COPD ([facility])      Chronic hypercapnia/KEVIN 2023     Metabolic encephalopathy 2023     Palliative care patient 2023     Alkalosis 2023     Malnutrition ([facility]) 06/10/2023     Hyperlipidemia 2023     COPD exacerbation ([facility]) 2023     Steroid-induced hyperglycemia 2023     Physical deconditioning 2023     Chronic anemia 2023     SIRS (systemic inflammatory response syndrome) 2023     Hyponatremia 2023     Acute on chronic systolic congestive heart failure ([facility]) 2022     Pulmonary nodules/lesions, multiple 2022     Prostate cancer ([facility]) 2021     BPH with obstruction/lower urinary tract symptoms 2021     Goals of care, counseling/discussion 2021     Acute on chronic respiratory failure with hypoxia and hypercapnia ([facility]) 2020     Macrocytosis without anemia 2019     Other insomnia 2019     GERD (gastroesophageal reflux disease) 2017     Nonobstructive atherosclerosis of coronary artery 2016     Mood disorder ([facility]) 2015     Prediabetes 2015     Osteoporosis  10/14/2014     Vitamin D deficiency 10/14/2014     Nonischemic cardiomyopathy (HCC) 09/26/2014     Left bundle-branch block 08/03/2013     Osteoarthritis 08/03/2013     Obstructive sleep apnea 07/29/2013       LOS (days): 6  Geometric Mean LOS (GMLOS) (days):   Days to GMLOS:     OBJECTIVE:  Risk of Unplanned Readmission Score: 24.48         Current admission status: Inpatient   Preferred Pharmacy:   [id number] - [place], [place] - [address]  [place] [address]  Phone: [phone] Fax: [phone]    Primary Care Provider: Fatmata Gustafson DO    Primary Insurance: BLUE CROSS  Secondary Insurance: MEDICARE    DISCHARGE DETAILS:                                                                                                 Additional Comments: HOD#6 MICU:  COPD exacerbation, CHF, currently on and tolerating O2@4L nc. Continue diurese and nebulizer treatments. Finishing 7th day of Ceftriaxone IV today. Has external urine catheter. Pt without distress and is cleared for transfer from MICU to med-surg telemetry bed. Discharge planning:  cannot be determined until seen by  PT/OT. STR vs C. Lives wtih significant other Mellisa Jungs. If returning home anticipate going to her home which is all on one floor.

## 2025-03-20 NOTE — PLAN OF CARE
Problem: OCCUPATIONAL THERAPY ADULT  Goal: Performs self-care activities at highest level of function for planned discharge setting.  See evaluation for individualized goals.  Description: Treatment Interventions: ADL retraining, Functional transfer training, UE strengthening/ROM, Endurance training, Cognitive reorientation, Patient/family training, Equipment evaluation/education, Compensatory technique education, Continued evaluation, Energy conservation, Activityengagement          See flowsheet documentation for full assessment, interventions and recommendations.   Note: Limitation: Decreased ADL status, Decreased UE strength, Decreased Safe judgement during ADL, Decreased endurance, Decreased self-care trans, Decreased high-level ADLs  Prognosis: Fair  Assessment: Pt is a 70 y/o male seen for OT eval s/p adm to [facility] w/ worsening shortness of breath. Pt is dx'd w/ acute on chronic respiratory failure. Pt  has a past medical history of Acute metabolic encephalopathy (03/29/2022), Arthritis, Bladder mass, Cardiomyopathy ([facility]), Chest pain, COPD (chronic obstructive pulmonary disease) ([facility]), COVID-19 virus infection (02/23/2023), CPAP (continuous positive airway pressure) dependence, Emphysema of lung ([facility]), Hypoxia, Left bundle branch block, Multiple pulmonary nodules, Pneumonia, Sleep apnea, obstructive, Smoker, and Weight loss (08/29/2019). Pt with active OT orders and activity as tolerated orders. Pt [place] his spouse and [place] w/ 3 [place]. Pt was I w/  ADLS and has assist for IADLS, does not drive, & required use of DME PTA including RW, w/c and rollator. Pt is currently demonstrating the following occupational deficits: Min A UB/LB ADLS, Min A transfers and functional mobility w/ HHA. These deficits that are impacting pt's baseline areas of occupation are a result of the following impairments: pain, endurance, activity tolerance, functional mobility, forward functional reach, balance, trunk  control, functional standing tolerance, decreased I w/ ADLS/IADLS, strength, ROM, decreased safety awareness, and decreased insight into deficits.The following Occupational Performance Areas to address include: eating, grooming, bathing/shower, toilet hygiene, dressing, medication management, socialization, health maintenance, functional mobility, and clothing management. Recommend moderate resource intensity (level ll) upon D/C. Pt to continue to benefit from acute immediate OT services to address the following goals 2-3x/week to  w/in 10-14 days:     Rehab Resource Intensity Level, OT: II (Moderate Resource Intensity)        Glenna Garay MS, OTR/L

## 2025-03-20 NOTE — SPEECH THERAPY NOTE
"Speech Language/Pathology  Speech-Language Pathology Progress Note      Patient Name: Natalio Hartmann Jr.    Today's Date: 3/20/2025      Subjective:  Pt was awake and alert. He was sitting upright in bed. Patient stated \" Let's go, how much is left\".  The pt wants to be fed quicker.  His O2 is stable but is RR increases and as he asks to be fed faster he also asks to be put back on the bipap.      Objective:  Pt was seen today for dysphagia therapy. Current diet is dysphagia 2 mechanical soft with thin liquids. Pt was on 4L O2 via nasal cannula. Oral care had already been completed. Focus of today's session was to maximize PO intake safety, improve pt's self monitoring, and assess with an orange.  The pt eats a lot of oranges at home and would like to eat them here . Seen w/ orange, peeled and cut into pieces.  .  Swallow function:   Pt was fed- slow manipulation of the orange but is able to fully clear the oral cavity for all pieces.  Mult swallows but no overt coughing.  The pt feels SOB - but highly suspect from the act of moving the food and swallowing.      Assessment:  The pt is not able to tolerate the level 3 meat and needs level 2 however was able to tolerate and orange with assistance and having it cut.  He would like to eat faster but then expends increased energy and still needs pacing.     Plan:  Continue dysphagia 2 mechanical soft and thin liquids. Continue ST follow up. Subsequent sessions to focus on maximizing PO intake safety, improving pt's self monitoring, and improving use of strategies to minimize risk of aspiration.   Allow pt to have oranges at bedside with supervision  "

## 2025-03-20 NOTE — PLAN OF CARE
Problem: PHYSICAL THERAPY ADULT  Goal: Performs mobility at highest level of function for planned discharge setting.  See evaluation for individualized goals.  Description: Treatment/Interventions: ADL retraining, Functional transfer training, LE strengthening/ROM, Elevations, Therapeutic exercise, Endurance training, Cognitive reorientation, Patient/family training, Equipment eval/education, Gait training, Bed mobility, Spoke to MD, Spoke to nursing, Spoke to case management, OT (Pt was seen in conjunction w/ OT 2* unstable presentation; medical complexity; new precautions/ limitations + limited activity tolerance and regression from baseline functional mobility.)  Equipment Recommended: Walker       See flowsheet documentation for full assessment, interventions and recommendations.  Note: Prognosis: Fair  Problem List: Decreased strength, Decreased endurance, Impaired balance, Decreased mobility, Decreased coordination, Decreased cognition, Impaired judgement, Decreased safety awareness    Pt is 69 y.o. male seen for PT evaluation s/p admit to [facility] on 3/14/2025  w dx of Acute on chronic respiratory failure with hypoxia and hypercapnia (HCC); CHF; possible bacterial pneumonia; Hypophosphatemia/refeeding syndrome.    Pt  has a past medical history of Acute metabolic encephalopathy (03/29/2022), Arthritis, Bladder mass, Cardiomyopathy (HCC), Chest pain, COPD (chronic obstructive pulmonary disease) (HCC), COVID-19 virus infection (02/23/2023), CPAP (continuous positive airway pressure) dependence, Emphysema of lung (HCC), Hypoxia, Left bundle branch block, Multiple pulmonary nodules, Pneumonia, Sleep apnea, obstructive, Smoker, and Weight loss (08/29/2019).  Personal factors affecting pt at time of IE include: steps to enter environment, limited home support,  past experience, inability to perform IADLs, inability to perform ADLs, inability to ambulate household distances, limited insight into  impairments.   Due to acute medical issues, ongoing medical management for or primary dx; pain, fall risk, SOB SHARIF w/ minimal activity; increased reliance on more restrictive AD compared to baseline;  decreased activity tolerance compared to baseline, increased assistance needed from caregiver at current time, continuous monitoring, trending labs;  multiple lines, decline in overall functional mobility status; health management issues; note unstable clinical picture (high complexity).  At baseline, pt reports living w/ spouse and adult son in a [place] w/ 3 [place]; pt is on 4-5L home O2 and walks limited distances w/ RW at baseline; is indep w/ ADLs and has family assist w/ IADLs ; 0 drive; is alone at times while spouse and son work. . Currently,  pt  is requiring Stephen A for bed skills; Stephen for functional transfers and Stephen for ambulation w/ HHA 4-5 steps to chair on 5Lo2 w/ further mobility limited by SOB/ SHARIF (without drop in Spo2).   Pt presents functioning below baseline and currently w/ overall mobility deficits 2* to: decreased LE strength/AROM; gross generalized weakness/ deconditioning; decreased endurance; decreased activity tolerance; impaired balance; gait deviations; decreased safety awareness; SOB/SHARIF; fatigue; impaired safety and judgement; limited insight into current deficits; bed/ chair alarms; multiple lines; frail;  Pt currently at risk for falls.  (Please find additional objective findings from PT assessment regarding body systems outlined above.) Pt will continue to benefit from skilled PT interventions to address stated impairments; to maximize functional potential; for ongoing pt/ family training; and DME needs.  PT is recommending PT Resource Intensity Level  2 (rehab)  on d/c.      PT will follow for STG as outlined on eval.      Barriers to Discharge: Inaccessible home environment, Decreased caregiver support  Barriers to Discharge Comments: CARLO home; alone at times  Rehab Resource  Intensity Level, PT: II (Moderate Resource Intensity)    See flowsheet documentation for full assessment.

## 2025-03-20 NOTE — PROGRESS NOTES
Progress Note - Critical Care/ICU   Name: Natalio Hartamnn Jr. 69 y.o. male I MRN: [id number]  Unit/Bed#: MICU 13 I Date of Admission: 3/14/2025   Date of Service: 3/20/2025 I Hospital Day: 6       Assessment & Plan   Active Hospital Problems    Diagnosis Date Noted POA    Acute on chronic respiratory failure with hypoxia and hypercapnia (HCC) 11/19/2020 Yes    Severe protein-calorie malnutrition (HCC) 10/14/2023 Yes    Metabolic encephalopathy 07/20/2023 Yes    Malnutrition (HCC) 06/10/2023 Yes    Hyperlipidemia 05/02/2023 Yes    COPD exacerbation (HCC) 04/28/2023 Yes    SIRS (systemic inflammatory response syndrome) 02/17/2023 Yes    Acute on chronic systolic congestive heart failure (HCC) 09/12/2022 Yes    Goals of care, counseling/discussion 02/25/2021 Not Applicable    Obstructive sleep apnea 07/29/2013 Yes      Resolved Hospital Problems   No resolved problems to display.       Neuro:   Diagnosis: Acute metabolic encephalopathy  Most likely 2/2 hypoxic hypercarbia   Resolved  Plan: CAM-ICU  Delirium precuations  Avoid neurotoxin  On bipap           CV:   Diagnosis: Acute on chronic HEFrEF  Nonischemic cardiomyopathy  BNP elevated 1079  Echo 03/16: EF 25% systolic function severely reduced severe global hypokinesis LA moderately dilated mild TR, no aortic stenosis, trace MR, mild TR normal RVSP, no pericardial effusion   Home diuretic regimen: Torsemide 20 mg alternating with 40 mg QOD  Outpatient cardiology notes review recommendation for palliative care   Plan:   IV Lasix 80 mg once daily  Monitor U/O: Goal - 1L in 24 hours  Monitor daily weights  Monitor BMP and electrolytes   Cardiology reccs apreciated     Pulm:  Diagnosis: Acute on chronic respiratory failure with hypoxia and hypercarbia  Multifactorial with COPD and CHF exacerbation  Bipap required in the ED  VBG in ED: 7.1/ 146  NC at home baseline 4L   Pro-Jorge Alberto negative BNP pending.   X-ray reviewed cardiomegaly, pulmonary congestion noted  Plan:   S/p IV  azithromycin  Continue supplemental oxygen goal oxygen >88%  Bipap at night and as needed during day  Encourage incentive spirometry  Diuretics for CHF exacerbation and respiratory treatment for COPD  S/p Diamox 2 doses  PO Prednisone 40 mg         COPD exacerbation:  Vbg 7.1/146  Required Bipap in the ED   Plan:   Continue supplemental oxygen goal oxygen >88%  Bipap at night and as needed during day  Encourage incentive spirometry  respiratory treatment for COPD   PO Prednisone 40 mg         KEVIN:  On trilogy NIV at night  Bipap at night      GI:   Diagnosis: Severe protein-calorie malnutrition  Plan: Consult nutrition        Diagnosis: Elevated transaminitis most likely secondary to congestive hepatopathy  Improving  Plan: Monitor CMP     :   No active issues     F/E/N:   F: None  E: Replete electrolytes as needed   N: Dysphagia diet      Heme/Onc:   Diagnosis: Macrocytic anemia, chronic  Baseline around 9ish    Most likely nutritional deficiency  Plan: Monitor Hgb and transfuse <7  Continue DVT ppx with Lovenox 40 mg sq.     Endo:   No active issues     ID:   Diagnosis: SIRS Criteria  Met SIRS criteria upon admission   Sepsis workup done admission  Procal 0.4  B cx: negative 5 days  S/p Empiric treatment with ceftraixone for PNA  Plan:   Continue CTX for PNA        MSK/Skin:   No active issues  Disposition: Critical care    ICU Core Measures     A: Assess, Prevent, and Manage Pain Has pain been assessed? NA  Need for changes to pain regimen? NA   B: Both SAT/SAT  N/A   C: Choice of Sedation RASS Goal: 0 Alert and Calm or N/A patient not on sedation  Need for changes to sedation or analgesia regimen? NA   D: Delirium CAM-ICU: Negative   E: Early Mobility  Plan for early mobility? NA   F: Family Engagement Plan for family engagement today? Yes         Prophylaxis:  VTE VTE covered by:  heparin (porcine), Subcutaneous, 5,000 Units at 03/20/25 0521       Stress Ulcer  not ordered         24 Hour Events : No  events overnight  Subjective       Objective :                   Vitals I/O      Most Recent Min/Max in 24hrs   Temp 97.6 °F (36.4 °C) Temp  Min: 97.6 °F (36.4 °C)  Max: 97.8 °F (36.6 °C)   Pulse 64 Pulse  Min: 61  Max: 97   Resp 21 Resp  Min: 19  Max: 33   BP 99/69 BP  Min: 84/60  Max: 115/76   O2 Sat 97 % SpO2  Min: 94 %  Max: 99 %      Intake/Output Summary (Last 24 hours) at 3/20/2025 0619  Last data filed at 3/20/2025 0559  Gross per 24 hour   Intake 1090 ml   Output 2825 ml   Net -1735 ml       Diet Dysphagia/Modified Consistency; Dysphagia 2-Mechanical Soft; Thin Liquid    Invasive Monitoring           Physical Exam   Physical Exam  Skin:     General: Skin is warm.   HENT:      Mouth/Throat:      Mouth: Mucous membranes are moist.   Cardiovascular:      Rate and Rhythm: Normal rate and regular rhythm.      Pulses: Normal pulses.   Musculoskeletal:      Right lower leg: No edema.      Left lower leg: No edema.   Abdominal:      Palpations: Abdomen is soft.   Constitutional:       Appearance: He is ill-appearing.      Comments: cachectic   Pulmonary:      Effort: Pulmonary effort is normal.      Breath sounds: Wheezing (b/l lower lobe) present.   Neurological:      Mental Status: He is oriented to person, place and time.          Diagnostic Studies        Lab Results: I have reviewed the following results:     Medications:  Scheduled PRN   aspirin, 81 mg, Daily  budesonide, 0.5 mg, Q12H  chlorhexidine, 15 mL, Q12H GABE  docusate sodium, 100 mg, BID  formoterol, 20 mcg, Q12H  furosemide, 80 mg, Daily  heparin (porcine), 5,000 Units, Q8H GABE  ipratropium, 0.5 mg, TID  levalbuterol, 1.25 mg, TID  polyethylene glycol, 17 g, Daily  potassium phosphate, 30 mmol, Once  pravastatin, 80 mg, Daily With Dinner  predniSONE, 10 mg, Daily      acetaminophen, 650 mg, Q6H PRN  aluminum-magnesium hydroxide-simethicone, 30 mL, Q6H PRN  ondansetron, 4 mg, Q6H PRN       Continuous          Labs:   CBC    Recent Labs      03/19/25  0529 03/20/25  0520   WBC 5.07 5.26   HGB 10.8* 11.1*   HCT 35.7* 36.3*    157     BMP    Recent Labs     03/19/25  0529 03/20/25  0520   SODIUM 139 138   K 3.4* 3.3*   CL 86* 91*   CO2 >45* 43*   AGAP  --  4   BUN 49* 42*   CREATININE 0.79 0.55*   CALCIUM 8.7 8.3*       Coags    No recent results     Additional Electrolytes  Recent Labs     03/19/25  0529 03/20/25  0520   MG 2.1 2.0   PHOS 1.8* 1.9*          Blood Gas    No recent results  Recent Labs     03/19/25 2127   PHVEN 7.344   [id number] 83.4*   PO2VEN 58.3*   [id number] 44.4*   BEVEN 14.8   [id number] 87.4*    LFTs  No recent results    Infectious  No recent results  Glucose  Recent Labs     03/19/25  0529 03/20/25  0520   GLUC 107 123

## 2025-03-20 NOTE — PROGRESS NOTES
Progress Note - Critical Care/ICU   Name: Natalio Hartmann Jr. 69 y.o. male I MRN: [id number]  Unit/Bed#: MICU 13 I Date of Admission: 3/14/2025   Date of Service: 3/20/2025 I Hospital Day: 6       Critical Care Interval Transfer Note:    Brief Hospital Summary: 69-year-old male with medical history of very severe COPD FEV1 22%, chronic hypoxic and hypercapnic respiratory failure on 4 L supplemental O2 + Trilogy NIV at night, chronic prednisone 10 mg, COPD cachexia nonischemic cardiomyopathy EF 25%, KEVIN on BiPAP who presents with worsening shortness of breath. In ED patient found to be hypotensive, lethargic, hypercapnic 7.19/141.7 for which BiPAP placed 20/6 (baseline 7.3/86.8). Being treated for CHF exacerbation v/s COPD exacerbation so he is on IV Lasix 80 mg daily, Nebs, Bipap and steroids. Intially there was concern for PNA hence completed CTX. He has been using Bipap on/off during the day and  Bipap at night. Also I am repleting currently his phos and potassium     Follow-up:  -Need phos and potassium repletion  -Urine output goal net negative 1 L in 24 hours    Barriers to discharge:   Volume overload not resolved completely   Electrolyte repletion  PT/OT eval     Consults: IP CONSULT TO PULMONOLOGY  IP CONSULT TO HEART FAILURE SERVICE  IP CONSULT TO CASE MANAGEMENT    Recommended to review admission imaging for incidental findings and document in discharge navigator: Chart reviewed, no known incidental findings noted at this time.      Discharge Plan: Anticipate discharge in 48-72 hrs to discharge location to be determined pending rehab evaluations.       Patient seen and evaluated by Critical Care today and deemed to be appropriate for transfer to Stepdown Level 2. Spoke to Dr Amin from [facility] to accept transfer. Critical care can be contacted via SecureChat with any questions or concerns. Please use the Critical Care AP Role in Secure Chat for any provider inquires until the patient is transferred out of the ICU  or until tomorrow at 0600.

## 2025-03-20 NOTE — OCCUPATIONAL THERAPY NOTE
Occupational Therapy Evaluation     Patient Name: Natalio Hartmann Jr.  Today's Date: 3/20/2025  Problem List  Principal Problem:    Acute on chronic respiratory failure with hypoxia and hypercapnia (HCC)  Active Problems:    Obstructive sleep apnea    Goals of care, counseling/discussion    Acute on chronic systolic congestive heart failure (HCC)    SIRS (systemic inflammatory response syndrome)    COPD exacerbation (HCC)    Hyperlipidemia    Malnutrition (HCC)    Metabolic encephalopathy    Severe protein-calorie malnutrition (HCC)    Past Medical History  Past Medical History:   Diagnosis Date    Acute metabolic encephalopathy 03/29/2022    Arthritis     Bladder mass     Cardiomyopathy (HCC)     Chest pain     COPD (chronic obstructive pulmonary disease) (HCC)     COVID-19 virus infection 02/23/2023    CPAP (continuous positive airway pressure) dependence     Emphysema of lung (HCC)     Hypoxia     nocturnal    Left bundle branch block     Multiple pulmonary nodules     last assessed: 10/12/16    Pneumonia     Sleep apnea, obstructive     Smoker     Weight loss 08/29/2019     Past Surgical History  Past Surgical History:   Procedure Laterality Date    COLONOSCOPY      CYSTOSCOPY      CYSTOSCOPY  02/17/2021    WI BLADDER INSTILLATION ANTICARCINOGENIC AGENT N/A 1/3/2020    Procedure: INSTILLATION MITOMYCIN;  Surgeon: Sundeep Canchola MD;  Location: [facility];  Service: Urology    WI CYSTO W/REMOVAL OF LESIONS SMALL N/A 1/3/2020    Procedure: TRANSURETHRAL RESECTION OF BLADDER TUMOR (TURBT);  Surgeon: Sundeep Canchola MD;  Location[facility];  Service: Urology    WI CYSTOURETHROSCOPY WITH BIOPSY N/A 4/13/2021    Procedure: CYSTOSCOPY WITH BIOPSIES;  Surgeon: Sundeep Canchola MD;  [facility];  Service: Urology    WI TRURL ELECTROSURG RESCJ PROSTATE BLEED COMPLETE N/A 4/13/2021    Procedure: TRANSURETHRAL RESECTION OF PROSTATE (TURP) BLADDER BIOPSY, FULGURATION;  Surgeon: Sundeep Canchola MD;   Location[facility];  Service: Urology           03/20/25 1137   OT Last Visit   OT Visit Date 03/20/25   Note Type   Note type Evaluation   Pain Assessment   Pain Assessment Tool 0-10   Pain Score No Pain   Restrictions/Precautions   Weight Bearing Precautions Per Order No   Other Precautions Cognitive;Chair Alarm;Bed Alarm;Multiple lines;Telemetry;Fall Risk;O2;Pain   Home Living   Type of Home House   Home Layout One level  (3 CARLO w/ landing)   Bathroom Shower/Tub Tub/shower unit   Bathroom Toilet Standard   Bathroom Equipment Grab bars in shower;Grab bars around toilet;Commode;Shower chair   Home Equipment Walker;Wheelchair-manual;Hospital bed;Other (Comment)  (rollator)   Prior Function   Level of [place] Independent with ADLs;Independent with functional mobility;Needs assistance with IADLS   Lives With Spouse;Son   Receives Help From Family   IADLs Family/Friend/Other provides transportation;Family/Friend/Other provides meals;Family/Friend/Other provides medication management   Falls in the last 6 months 0   Vocational Retired   Lifestyle   Autonomy I w/ ADLS, assist IADLS, Mod I w/ transfers and functional mobility PTA   Reciprocal Relationships Pt lives w/ his spouse and son   Service to Others retired; demolition   Intrinsic Gratification building model cars   ADL   Eating Assistance 4  Minimal Assistance   Grooming Assistance 4  Minimal Assistance   UB Bathing Assistance 4  Minimal Assistance   LB Bathing Assistance 4  Minimal Assistance   UB Dressing Assistance 4  Minimal Assistance   LB Dressing Assistance 4  Minimal Assistance   Toileting Assistance  4  Minimal Assistance   Functional Assistance 4  Minimal Assistance   Functional Deficit Steadying;Verbal cueing;Supervision/safety;Increased time to complete   Bed Mobility   Supine to Sit 4  Minimal assistance   Additional items Assist x 1;HOB elevated;Increased time required;Verbal cues;LE management   Sit to Supine Unable to assess   Additional  Comments pt sat EOB w/ close S for sitting balance/trunk control   Transfers   Sit to Stand 4  Minimal assistance   Additional items Assist x 1;Increased time required;Verbal cues   Stand to Sit 4  Minimal assistance   Additional items Assist x 1;Increased time required;Verbal cues   Additional Comments w/ HHA   Functional Mobility   Functional Mobility 4  Minimal assistance   Additional Comments pt took few small steps from EOB to recliner w/ Min A x1; HHA used   Additional items Hand hold assistance   Balance   Static Sitting Good   Dynamic Sitting Fair +   Static Standing Fair   Dynamic Standing Fair -   Ambulatory Poor +   Activity Tolerance   Activity Tolerance Patient limited by fatigue;Patient limited by pain   Medical Staff Made Aware PT   Nurse Made Aware yes   RUE Assessment   RUE Assessment WFL  (generalized weakness)   LUE Assessment   LUE Assessment WFL  (generalized weakness)   Hand Function   Gross Motor Coordination Functional   Fine Motor Coordination Functional   Psychosocial   Psychosocial (WDL) X   Patient Behaviors/Mood Cooperative   Cognition   Overall Cognitive Status WFL   Arousal/Participation Responsive;Cooperative   Attention Attends with cues to redirect   Orientation Level Oriented X4   Memory Decreased recall of precautions   Following Commands Follows one step commands without difficulty   Comments pt is pleasant and cooperative; has decreased insight/safety awareness.   Assessment   Limitation Decreased ADL status;Decreased UE strength;Decreased Safe judgement during ADL;Decreased endurance;Decreased self-care trans;Decreased high-level ADLs   Prognosis Fair   Assessment Pt is a 70 y/o male seen for OT eval s/p adm to [facility] w/ worsening shortness of breath. Pt is dx'd w/ acute on chronic respiratory failure. Pt  has a past medical history of Acute metabolic encephalopathy (03/29/2022), Arthritis, Bladder mass, Cardiomyopathy (HCC), Chest pain, COPD (chronic obstructive pulmonary  disease) (HCC), COVID-19 virus infection (2023), CPAP (continuous positive airway pressure) dependence, Emphysema of lung (HCC), Hypoxia, Left bundle branch block, Multiple pulmonary nodules, Pneumonia, Sleep apnea, obstructive, Smoker, and Weight loss (2019). Pt with active OT orders and activity as tolerated orders. Pt lives with his spouse and son in ranch home w/ 3 CARLO. Pt was I w/  ADLS and has assist for IADLS, does not drive, & required use of DME PTA including RW, w/c and rollator. Pt is currently demonstrating the following occupational deficits: Min A UB/LB ADLS, Min A transfers and functional mobility w/ HHA. These deficits that are impacting pt's baseline areas of occupation are a result of the following impairments: pain, endurance, activity tolerance, functional mobility, forward functional reach, balance, trunk control, functional standing tolerance, decreased I w/ ADLS/IADLS, strength, ROM, decreased safety awareness, and decreased insight into deficits.The following Occupational Performance Areas to address include: eating, grooming, bathing/shower, toilet hygiene, dressing, medication management, socialization, health maintenance, functional mobility, and clothing management. Recommend moderate resource intensity (level ll) upon D/C. Pt to continue to benefit from acute immediate OT services to address the following goals 2-3x/week to  w/in 10-14 days:   Goals   Patient Goals to be more independent   LTG Time Frame 10-14   Long Term Goal #1 see below listed goals   Plan   Treatment Interventions ADL retraining;Functional transfer training;UE strengthening/ROM;Endurance training;Cognitive reorientation;Patient/family training;Equipment evaluation/education;Compensatory technique education;Continued evaluation;Energy conservation;Activityengagement   Goal Expiration Date 25   OT Frequency 2-3x/wk   Discharge Recommendation   Rehab Resource Intensity Level, OT II (Moderate  Resource Intensity)   Additional Comments  The patient's raw score on the AM-PAC Daily Activity Inpatient Short Form is 18. A raw score of less than 19 suggests the patient may benefit from discharge to post-acute rehabilitation services. Please refer to the recommendation of the Occupational Therapist for safe discharge planning.   Additional Comments 2 Pt seen as a co-session due to the patient's co-morbidities, clinically unstable presentation, and present impairments which are a regression from the patient's baseline.   AM-PAC Daily Activity Inpatient   Lower Body Dressing 2   Bathing 3   Toileting 3   Upper Body Dressing 3   Grooming 3   Eating 4   Daily Activity Raw Score 18   Daily Activity Standardized Score (Calc for Raw Score >=11) 38.66   AM-PAC Applied Cognition Inpatient   Following a Speech/Presentation 3   Understanding Ordinary Conversation 4   Taking Medications 4   Remembering Where Things Are Placed or Put Away 4   Remembering List of 4-5 Errands 3   Taking Care of Complicated Tasks 3   Applied Cognition Raw Score 21   Applied Cognition Standardized Score 44.3   End of Consult   Education Provided Yes   Patient Position at End of Consult Bedside chair;Bed/Chair alarm activated;All needs within reach   Nurse Communication Nurse aware of consult      GOALS    1) Pt will improve activity tolerance to G for 30 min txment sessions for increased engagement in functional tasks  2) Pt will complete UB/LB dressing/self care w/ mod I using adaptive device and DME as needed  3) Pt will complete bathing w/ Mod I w/ use of AE and DME as needed  4) Pt will complete toileting w/ mod I w/ G hygiene/thoroughness using DME as needed  5) Pt will improve functional transfers to Mod I on/off all surfaces using DME as needed w/ G balance/safety   6) Pt will improve functional mobility during ADL/IADL/leisure tasks to Mod I using DME as needed w/ G balance/safety   7) Pt will improve bed mobility to Mod I and sit EOB w/  G balance/trunk control as a prerequisite for further engagement in meaningful tasks  8) Pt will be attentive 100% of the time during ongoing cognitive assessment w/ G participation to assist w/ safe d/c planning/recommendations  9) Pt will demonstrate G carryover of pt/caregiver education and training as appropriate w/o cues w/ good tolerance to increase safety during functional tasks  10) Pt will demonstrate 100% carryover of energy conservation techniques t/o functional I/ADL/leisure tasks w/o cues s/p skilled education to increase endurance during functional tasks    Glenna Garay MS, OTR/L

## 2025-03-20 NOTE — PROGRESS NOTES
Heart Failure/ Pulmonary Hypertension Progress Note - Natalio Hartmann Jr. 69 y.o. male MRN: [id number]    Unit/Bed#: MICU 13 Encounter: [id number]      Assessment:    Principal Problem:    Acute on chronic respiratory failure with hypoxia and hypercapnia (HCC)  Active Problems:    Obstructive sleep apnea    Acute on chronic systolic congestive heart failure (HCC)    Goals of care, counseling/discussion    SIRS (systemic inflammatory response syndrome)    COPD exacerbation (HCC)    Hyperlipidemia    Malnutrition (HCC)    Metabolic encephalopathy    Severe protein-calorie malnutrition (HCC)      Subjective:   Natalio Hartmann Jr. is a 69 y.o. male with a PMH of severe COPD, chronic hypoxic and hypercapnic respiratory failure on 4 L supplemental oxygen, cachexia, nonischemic cardiomyopathy, HFrEF/Stage C/D who presents to the ER with worsening shortness of breath,  increasingly lethargic feeling, and fatigue.  On day of admit, he was visibly short of breath slumped in the chair noted by his wife and brought to the ER.       In the ED he is noted to be hypoxic and hypercapnic requiring BiPAP. BNP on admit>1000.  CXR shows pulmonary congestion. IV Lasix started in the ER.  Unable to wean off Bipap--admitted to MICU.      Patient seen and examined.  No significant events overnight. Patient asking to go home. Family at bedside    Objective:   Intake/ Output: 1090/2825/-1.7 L  Weight: bed weights  Tele: SR/ST, NSVT  MAPs: 75-80 mmHg    Acute on chronic HFrEF; LVEF 20-25%; NYHA III; ACC/AHA Stage C/D  -Etiology: Nonischemic cardiomyopathy.  Possible dyssynchrony from chronic LBBB.  Despite QRS only being 120 ms, echo shows significant dyssynchrony.  -warm on exam, hemodynamically stable. He is cachectic with end stage COPD. Appropriate for hospice   -Difficult to assess JVP given significant respiratory variation though TTE findings this admission suggestive of volume overload state  -replace K. Continue diuresis with IV Lasix.  Dosing per CC team.   -attempt OOB to chair with PT/OT today.  --ongoing discussions with patient and family regarding the appropriateness of palliative care involvement. More open to discussions today.       TTE 3/16/25:LVEF 25%, RV dilated, atria severely dilated, mild TR, est PASP 42 mmHg, IVC dilated, est RAP 15 mmHg   TTE 9/6/24: LVEF 25%  TTE 5/1/23: LVEF 20%. Severe global hypokinesis with regional variation. RV cavity size normal, systolic function normal. Trace MR, TR.   TTE 2/21/23: LVEF 30%. LVIDd 6.6cm. severe global hypokinesis. Grade I DD. RV cavity size and systolic function normal. Mild TR.   TTE 2/12/2023: LVEF 20-25%.  Severe global hypokinesis with regional variation.  Grade 1 DD.  Abnormal septal motion consistent with LBBB.  RV cavity mildly dilated, normal systolic function.  Mild MR, mild TR.  RVSP 38.  Dilated IVC.  TTE 8/26/2022: LVEF 40%.  RV cavity mildly dilated.  Systolic function normal.  Mild MR, TR.  Normal IVC.     Neurohormonal Blockade: GDMT limited by hypotension  --Beta Blocker: no  --ARNi / ACEi / ARB: losartan 12.5 mg QD, off   --Aldosterone Antagonist: no   --SGLT2 Inhibitor: no  --Home Diuretic: torsemide 40 mg QD alternating with 20 mg QD  --Inpatient diuretic : Lasix 80 mg IV daily     Sudden Cardiac Death Risk Reduction:  --ICD: LVEF 20%.      Electrical Resynchronization:  --Candidacy for BiV device: QRSd 120ms, chronic LBBB with dyssynchrony on echocardiogram.     Advanced Therapies (if appropriate): Not appropriate at this time, particularly considering advanced lung disease.        Acute on Chronic Respiratory Failure with hypoxia and hypercapnia   -Required BiPAP in the ER--pCO2 on admit 140  -Multifactorial with COPD and CHF exacerbation contributing  -O2 requirements coming down.   -IV steroids, IV Lasix per CC team.     COPD (end stage)  Former smoker; 105 pack years, quit in 2012.  Severe disease,   On home oxygen.  Multiple hospitalizations with acute COPD  "exacerbations   Follows with pulm    CAP  -S/P antibiotics  -management per CC team.     H/O COVID-19    Nonobstructive CAD  Has coronary calcium on CT  Previously on Rosuvastatin 10 mg. Notes indicate ASA, but not listed in EMR    Hyperlipidemia        Lab Results   Component Value Date     LDLCALC 75 08/19/2021    Continue statin    KEVIN   On BiPAP nightly    GOC.    -palliative care discussed. He is not interested       Review of Systems   All other systems reviewed and are negative.       Central Line (day, reason):  Stanley catheter (day, reason):    Vitals: Blood pressure 94/63, pulse 62, temperature 97.6 °F (36.4 °C), temperature source Oral, resp. rate (!) 27, height 5' 2\" (1.575 m), weight 49.8 kg (109 lb 12.6 oz), SpO2 98%., Body mass index is 20.08 kg/m²., I/O last 3 completed shifts:  In: 1570 [P.O.:1320; IV Piggyback:250]  Out: 4625 [Urine:4625]  No intake/output data recorded.  Wt Readings from Last 3 Encounters:   03/20/25 49.8 kg (109 lb 12.6 oz)   02/21/25 44.3 kg (97 lb 11.2 oz)   09/06/24 44.9 kg (99 lb)       Intake/Output Summary (Last 24 hours) at 3/20/2025 0851  Last data filed at 3/20/2025 0559  Gross per 24 hour   Intake 1090 ml   Output 2825 ml   Net -1735 ml     I/O last 3 completed shifts:  In: 1570 [P.O.:1320; IV Piggyback:250]  Out: 4625 [Urine:4625]        Physical Exam:  Vitals:    03/20/25 0559 03/20/25 0600 03/20/25 0700 03/20/25 0714   BP:  102/70 94/63    BP Location:       Pulse:  64 62    Resp:  (!) 25 (!) 27    Temp:       TempSrc:       SpO2:  99% 98% 98%   Weight: 49.8 kg (109 lb 12.6 oz)      Height:           GEN: Natalio Hartmann Jr. Is ill appearing,cachectic,  tachypneic,   HEENT: pupils equal, round, and reactive to light; extraocular muscles intact  NECK: supple, no carotid bruits   HEART: regular rhythm, normal S1 and S2, no murmurs, clicks, gallops or rubs, JVP is  elevated  LUNGS:diminished, coarse througout  ABDOMEN: normal bowel sounds, soft, no tenderness, no " distention  EXTREMITIES: peripheral pulses normal; no clubbing, cyanosis, or edema  NEURO: no focal findings   SKIN: normal without suspicious lesions on exposed skin      Current Facility-Administered Medications:     acetaminophen (TYLENOL) tablet 650 mg, 650 mg, Oral, Q6H PRN, Sadie Sung MD    aluminum-magnesium hydroxide-simethicone (MAALOX) oral suspension 30 mL, 30 mL, Oral, Q6H PRN, Sadie Sung MD    aspirin chewable tablet 81 mg, 81 mg, Oral, Daily, DELIA Bowens, 81 mg at 03/19/25 0827    budesonide (PULMICORT) inhalation solution 0.5 mg, 0.5 mg, Nebulization, Q12H, Sadie Sung MD, 0.5 mg at 03/20/25 0714    chlorhexidine (PERIDEX) 0.12 % oral rinse 15 mL, 15 mL, Mouth/Throat, Q12H Nguyễn BERNAL DO, 15 mL at 03/19/25 2115    docusate sodium (COLACE) capsule 100 mg, 100 mg, Oral, BID, Sadie Sung MD, 100 mg at 03/19/25 1724    formoterol (PERFOROMIST) nebulizer solution 20 mcg, 20 mcg, Nebulization, Q12H, Yasmin Bennett MD, 20 mcg at 03/20/25 0714    furosemide (LASIX) injection 80 mg, 80 mg, Intravenous, Daily, Richelle Martines MD    heparin (porcine) subcutaneous injection 5,000 Units, 5,000 Units, Subcutaneous, Q8H Nguyễn BERNAL DO, 5,000 Units at 03/20/25 0521    ipratropium (ATROVENT) 0.02 % inhalation solution 0.5 mg, 0.5 mg, Nebulization, TID, Sadie Sung MD, 0.5 mg at 03/20/25 0714    levalbuterol (XOPENEX) inhalation solution 1.25 mg, 1.25 mg, Nebulization, TID, Sadie Sung MD, 1.25 mg at 03/20/25 0714    ondansetron (ZOFRAN) injection 4 mg, 4 mg, Intravenous, Q6H PRN, Sadie Sung MD    polyethylene glycol (MIRALAX) packet 17 g, 17 g, Oral, Daily, Sadie Sung MD, 17 g at 03/19/25 0828    potassium phosphates 30 mmol in sodium chloride 0.9 % 250 mL infusion, 30 mmol, Intravenous, Once, Richelle Martines MD, Last Rate: 41.7 mL/hr at 03/20/25 0704, 30 mmol at 03/20/25 0704    pravastatin  (PRAVACHOL) tablet 80 mg, 80 mg, Oral, Daily With Dinner, DELIA Bowens, 80 mg at 03/19/25 1711    predniSONE tablet 10 mg, 10 mg, Oral, Daily, Nguyễn Liu DO      Labs & Results:        Results from last 7 days   Lab Units 03/20/25  0520 03/19/25  0529 03/18/25  0521   WBC Thousand/uL 5.26 5.07 3.91*   HEMOGLOBIN g/dL 11.1* 10.8* 10.9*   HEMATOCRIT % 36.3* 35.7* 37.1   PLATELETS Thousands/uL 157 157 160         Results from last 7 days   Lab Units 03/20/25  0520 03/19/25  0529 03/18/25  0521 03/17/25  0502 03/16/25  0509   POTASSIUM mmol/L 3.3* 3.4* 3.3* 3.7 3.9   CHLORIDE mmol/L 91* 86* 89* 88* 90*   CO2 mmol/L 43* >45* 45* >45* >45*   BUN mg/dL 42* 49* 40* 44* 49*   CREATININE mg/dL 0.55* 0.79 0.68 0.68 0.85   CALCIUM mg/dL 8.3* 8.7 8.2* 8.5 8.2*   ALK PHOS U/L  --   --  44 50 51   ALT U/L  --   --  42 59* 64*   AST U/L  --   --  28 46* 63*     Results from last 7 days   Lab Units 03/14/25  1139   INR  0.84*         Bren LIN

## 2025-03-21 LAB
ANION GAP SERPL CALCULATED.3IONS-SCNC: 5 MMOL/L (ref 4–13)
BASOPHILS # BLD AUTO: 0 THOUSANDS/ÂΜL (ref 0–0.1)
BASOPHILS NFR BLD AUTO: 0 % (ref 0–1)
BUN SERPL-MCNC: 34 MG/DL (ref 5–25)
CALCIUM SERPL-MCNC: 9.1 MG/DL (ref 8.4–10.2)
CHLORIDE SERPL-SCNC: 89 MMOL/L (ref 96–108)
CO2 SERPL-SCNC: 45 MMOL/L (ref 21–32)
CREAT SERPL-MCNC: 0.56 MG/DL (ref 0.6–1.3)
EOSINOPHIL # BLD AUTO: 0.05 THOUSAND/ÂΜL (ref 0–0.61)
EOSINOPHIL NFR BLD AUTO: 1 % (ref 0–6)
ERYTHROCYTE [DISTWIDTH] IN BLOOD BY AUTOMATED COUNT: 13.1 % (ref 11.6–15.1)
GFR SERPL CREATININE-BSD FRML MDRD: 107 ML/MIN/1.73SQ M
GLUCOSE SERPL-MCNC: 93 MG/DL (ref 65–140)
HCT VFR BLD AUTO: 38.6 % (ref 36.5–49.3)
HGB BLD-MCNC: 11.9 G/DL (ref 12–17)
IMM GRANULOCYTES # BLD AUTO: 0.02 THOUSAND/UL (ref 0–0.2)
IMM GRANULOCYTES NFR BLD AUTO: 0 % (ref 0–2)
LYMPHOCYTES # BLD AUTO: 0.79 THOUSANDS/ÂΜL (ref 0.6–4.47)
LYMPHOCYTES NFR BLD AUTO: 15 % (ref 14–44)
MAGNESIUM SERPL-MCNC: 2.2 MG/DL (ref 1.9–2.7)
MCH RBC QN AUTO: 30.9 PG (ref 26.8–34.3)
MCHC RBC AUTO-ENTMCNC: 30.8 G/DL (ref 31.4–37.4)
MCV RBC AUTO: 100 FL (ref 82–98)
MONOCYTES # BLD AUTO: 0.58 THOUSAND/ÂΜL (ref 0.17–1.22)
MONOCYTES NFR BLD AUTO: 11 % (ref 4–12)
NEUTROPHILS # BLD AUTO: 3.98 THOUSANDS/ÂΜL (ref 1.85–7.62)
NEUTS SEG NFR BLD AUTO: 73 % (ref 43–75)
NRBC BLD AUTO-RTO: 0 /100 WBCS
PHOSPHATE SERPL-MCNC: 3.6 MG/DL (ref 2.3–4.1)
PHOSPHATE SERPL-MCNC: 4.6 MG/DL (ref 2.3–4.1)
PLATELET # BLD AUTO: 158 THOUSANDS/UL (ref 149–390)
PMV BLD AUTO: 9.9 FL (ref 8.9–12.7)
POTASSIUM SERPL-SCNC: 3.8 MMOL/L (ref 3.5–5.3)
RBC # BLD AUTO: 3.85 MILLION/UL (ref 3.88–5.62)
SODIUM SERPL-SCNC: 139 MMOL/L (ref 135–147)
WBC # BLD AUTO: 5.42 THOUSAND/UL (ref 4.31–10.16)

## 2025-03-21 PROCEDURE — 94760 N-INVAS EAR/PLS OXIMETRY 1: CPT

## 2025-03-21 PROCEDURE — 85025 COMPLETE CBC W/AUTO DIFF WBC: CPT

## 2025-03-21 PROCEDURE — 94660 CPAP INITIATION&MGMT: CPT

## 2025-03-21 PROCEDURE — 94640 AIRWAY INHALATION TREATMENT: CPT

## 2025-03-21 PROCEDURE — 84100 ASSAY OF PHOSPHORUS: CPT

## 2025-03-21 PROCEDURE — 94664 DEMO&/EVAL PT USE INHALER: CPT

## 2025-03-21 PROCEDURE — 92526 ORAL FUNCTION THERAPY: CPT

## 2025-03-21 PROCEDURE — 83735 ASSAY OF MAGNESIUM: CPT

## 2025-03-21 PROCEDURE — 99232 SBSQ HOSP IP/OBS MODERATE 35: CPT | Performed by: INTERNAL MEDICINE

## 2025-03-21 PROCEDURE — 80048 BASIC METABOLIC PNL TOTAL CA: CPT

## 2025-03-21 RX ADMIN — BUDESONIDE 0.5 MG: 0.5 INHALANT RESPIRATORY (INHALATION) at 08:40

## 2025-03-21 RX ADMIN — IPRATROPIUM BROMIDE 0.5 MG: 0.5 SOLUTION RESPIRATORY (INHALATION) at 13:44

## 2025-03-21 RX ADMIN — IPRATROPIUM BROMIDE 0.5 MG: 0.5 SOLUTION RESPIRATORY (INHALATION) at 20:32

## 2025-03-21 RX ADMIN — CHLORHEXIDINE GLUCONATE 15 ML: 1.2 SOLUTION ORAL at 09:29

## 2025-03-21 RX ADMIN — LEVALBUTEROL HYDROCHLORIDE 1.25 MG: 1.25 SOLUTION RESPIRATORY (INHALATION) at 13:44

## 2025-03-21 RX ADMIN — ASPIRIN 81 MG CHEWABLE TABLET 81 MG: 81 TABLET CHEWABLE at 09:29

## 2025-03-21 RX ADMIN — HEPARIN SODIUM 5000 UNITS: 5000 INJECTION INTRAVENOUS; SUBCUTANEOUS at 05:14

## 2025-03-21 RX ADMIN — BUDESONIDE 0.5 MG: 0.5 INHALANT RESPIRATORY (INHALATION) at 20:32

## 2025-03-21 RX ADMIN — LEVALBUTEROL HYDROCHLORIDE 1.25 MG: 1.25 SOLUTION RESPIRATORY (INHALATION) at 20:32

## 2025-03-21 RX ADMIN — FUROSEMIDE 80 MG: 10 INJECTION, SOLUTION INTRAMUSCULAR; INTRAVENOUS at 09:29

## 2025-03-21 RX ADMIN — CHLORHEXIDINE GLUCONATE 15 ML: 1.2 SOLUTION ORAL at 20:38

## 2025-03-21 RX ADMIN — HEPARIN SODIUM 5000 UNITS: 5000 INJECTION INTRAVENOUS; SUBCUTANEOUS at 15:14

## 2025-03-21 RX ADMIN — IPRATROPIUM BROMIDE 0.5 MG: 0.5 SOLUTION RESPIRATORY (INHALATION) at 08:40

## 2025-03-21 RX ADMIN — LEVALBUTEROL HYDROCHLORIDE 1.25 MG: 1.25 SOLUTION RESPIRATORY (INHALATION) at 08:40

## 2025-03-21 RX ADMIN — PREDNISONE 10 MG: 10 TABLET ORAL at 09:29

## 2025-03-21 RX ADMIN — HEPARIN SODIUM 5000 UNITS: 5000 INJECTION INTRAVENOUS; SUBCUTANEOUS at 21:25

## 2025-03-21 NOTE — SPEECH THERAPY NOTE
"Speech Language/Pathology  Speech-Language Pathology Progress Note      Patient Name: Natalio Hartmann Jr.    Today's Date: 3/21/2025      Subjective:  Pt was awake and alert. He was sitting up in chair at bedside. Patient stated \" what about the catheter?\".    Objective:  Pt was seen today for dysphagia therapy. Current diet is dysphagia 2 mechanical soft with thin liquids. Pt was on 4L O2 via nasal cannula. Oral care had already been completed. Focus of today's session was to maximize PO intake safety, determine potential for diet texture advancement, and improve pt's self monitoring. The pt is c/o the diet and wants more solids that he previously stated he was unable to eat.  Today asked for a peanut butter and jelly sandwich.  Offered one w/ sips of ensure in between  Swallow function:   Pt self fed the material.  Bolus retrieval and manipulation were slow. AP transfer was WFL. He used mult swallows w/ all material.  Pharyngeal swallow initiation was present. Hyolaryngeal excursion was reduced. His head is back and he was cued to bring his head back to neutral.  No s/s aspiration occurred during session today.  His O2 remained at 96%    Assessment:  The pt tolerated the sandwich today, able to transfer and swallow without difficulty.      Plan:  Consider changing diet to a regular diet w/ chopped meat and veggies so the pt is able to have the material that he is able to tolerate at home  Oral care often  Will follow   "

## 2025-03-21 NOTE — PROGRESS NOTES
Heart Failure Cardiology - Inpatient Consult Follow up Note    Natalio Hartmann Jr.  MRN: [id number]  Unit/Bed#: MICU 13; Encounter: [id number]      Assessment / Plan:  69 y.o. male with a PMH of severe COPD, chronic hypoxic and hypercapnic respiratory failure on 4 L supplemental oxygen, cachexia, nonischemic cardiomyopathy, HFrEF --   who presents to the ER 3-14-25 with worsening shortness of breath,  increasingly lethargic feeling, and fatigue.  On day of admit, he was visibly short of breath slumped in the chair noted by his wife and brought to the ER.   In the ED he was noted to be hypoxic and hypercapnic requiring BiPAP.   BNP on admit>1000 (higher than priors).  CXR showed pulmonary congestion.  Unable to wean off Bipap--admitted to MICU.   Heart failure team consulted.      # Acute on Chronic Respiratory Failure with hypoxia and hypercapnia  # COPD exacerbation  -Former smoker; 105 pack years, quit in 2012.   -Multiple hospitalizations with acute COPD exacerbations   -Required BiPAP in the ER--CO2 on admit 140--> now 83  -Multifactorial with COPD and CHF exacerbation contributing (COPD > CHF)  -treatment of COPD exacerbation per pulmonary / primary team  -treatment of CHF as below     # HFrEF - acute on chronic     ETIOLOGY:  - LVEF 25%; NYHA III; ACC/AHA Stage C/D   - Nonischemic cardiomyopathy.  Possible dyssynchrony from chronic LBBB.  Despite QRS only being 120 ms, echo shows significant dyssynchrony.     VOLUME:  - home diuretic:  torsemide 40 mg QD alternating with 20 mg QD   - inpatient diuretic:  IVC dilated on echo.  BNP higher than priors.  Recommend continuing IV diuresis for net negative volume status.    GDMT:    (Limited by low BP)  - not currently on GDMT    DEVICE:  - he does not have an ICD.   Severe lung disease with life expectancy < 1 year.     # Metabolic Encephalopathy (HCC)  - on admit likely d/t Hypercapnia, hypoxia     # Nonobstructive CAD  Has coronary calcium on CT  -  on aspirin  - on pravastatin     # Hyperlipidemia  - on pravastatin     # KEVIN   On BiPAP nightly     # Cachexia  -BMI 17.7     # GOC.    -palliative care discussed. He is not interested at this time        Today's Plan Summary:  Recommend continuing IV diuresis for today  Management of COPD exacerbation and severe lung disease per primary team  Overall, short-term prognosis is concerning and long-term prognosis is guarded.  Recommend palliative care and discussions about home hospice.                     Interval Events:  The patient is somnolent so more history was obtained from the patients visitor at his bedside.  She thinks he wants to remain full code despite discussions yesterday about hospice.    Subjective:  Continued shortness of breath.  No chest pain.    Objective:  Vitals:        reviewed all vitals past 24 hours.  See epic.  I+O's:         900 / 2250.  Neg 1.3 L  Weights:    Significantly down if accurate (bed scale)  Telemetry:  sinus rhythm     Cardiac Imaging personally reviewed:  Echo 3-16-25  LVIDd 6.1cm.  EF 25%.  Grade 1 DD.  abnormal septal motion consistent with left bundle branch block.   RV is moderately dilated. Systolic function is low normal.   Mild TR.    RVSP 42.      Patient Active Problem List    Diagnosis Date Noted    Iron deficiency anemia secondary to inadequate dietary iron intake 05/17/2024    SIADH (syndrome of inappropriate ADH production) ([facility]) 05/17/2024    Type 2 diabetes mellitus without complication, with long-term current use of insulin ([facility]) 05/17/2024    End stage COPD ([facility]) 02/08/2024    Dependence on respirator (ventilator) status ([facility]) 01/10/2024    Severe protein-calorie malnutrition ([facility]) 10/14/2023    History of bladder cancer 07/31/2023    Pulmonary cachexia due to COPD ([facility])     Chronic hypercapnia/KEVIN 07/20/2023    Metabolic encephalopathy 07/20/2023    Palliative care patient 06/13/2023    Alkalosis 06/12/2023    Malnutrition ([facility]) 06/10/2023    Hyperlipidemia  05/02/2023    COPD exacerbation (HCC) 04/28/2023    Steroid-induced hyperglycemia 03/30/2023    Physical deconditioning 02/21/2023    Chronic anemia 02/17/2023    SIRS (systemic inflammatory response syndrome) 02/17/2023    Hyponatremia 02/17/2023    Acute on chronic systolic congestive heart failure (HCC) 09/12/2022    Pulmonary nodules/lesions, multiple 03/27/2022    Prostate cancer ([facility]) 05/24/2021    BPH with obstruction/lower urinary tract symptoms 04/13/2021    Goals of care, counseling/discussion 02/25/2021    Acute on chronic respiratory failure with hypoxia and hypercapnia ([facility]) 11/19/2020    Macrocytosis without anemia 05/23/2019    Other insomnia 02/18/2019    GERD (gastroesophageal reflux disease) 01/05/2017    Nonobstructive atherosclerosis of coronary artery 02/25/2016    Mood disorder ([facility]) 08/18/2015    Prediabetes 02/19/2015    Osteoporosis 10/14/2014    Vitamin D deficiency 10/14/2014    Nonischemic cardiomyopathy ([facility]) 09/26/2014    Left bundle-branch block 08/03/2013    Osteoarthritis 08/03/2013    Obstructive sleep apnea 07/29/2013       Past Medical History:   Diagnosis Date    Acute metabolic encephalopathy 03/29/2022    Arthritis     Bladder mass     Cardiomyopathy ([facility])     Chest pain     COPD (chronic obstructive pulmonary disease) ([facility])     COVID-19 virus infection 02/23/2023    CPAP (continuous positive airway pressure) dependence     Emphysema of lung ([facility])     Hypoxia     nocturnal    Left bundle branch block     Multiple pulmonary nodules     last assessed: 10/12/16    Pneumonia     Sleep apnea, obstructive     Smoker     Weight loss 08/29/2019       No Known Allergies      Current Facility-Administered Medications:     acetaminophen (TYLENOL) tablet 650 mg, 650 mg, Oral, Q6H PRN, Sadie Sung MD    aluminum-magnesium hydroxide-simethicone (MAALOX) oral suspension 30 mL, 30 mL, Oral, Q6H PRN, Sadie Sung MD    aspirin chewable tablet 81 mg, 81 mg, Oral, Daily,  DELIA Bowens, 81 mg at 03/21/25 0929    budesonide (PULMICORT) inhalation solution 0.5 mg, 0.5 mg, Nebulization, Q12H, Sadie Sung MD, 0.5 mg at 03/21/25 0840    chlorhexidine (PERIDEX) 0.12 % oral rinse 15 mL, 15 mL, Mouth/Throat, Q12H [facility], 15 mL at 03/21/25 0929    docusate sodium (COLACE) capsule 100 mg, 100 mg, Oral, BID, Sadie Sung MD, 100 mg at 03/20/25 0942    formoterol (PERFOROMIST) nebulizer solution 20 mcg, 20 mcg, Nebulization, Q12H, Yasmin Bennett MD, 20 mcg at 03/20/25 0714    furosemide (LASIX) injection 80 mg, 80 mg, Intravenous, Daily, Richelle Martines MD, 80 mg at 03/21/25 0929    heparin (porcine) subcutaneous injection 5,000 Units, 5,000 Units, Subcutaneous, Q8H [facility], Nguyễn Liu DO, 5,000 Units at 03/21/25 0514    ipratropium (ATROVENT) 0.02 % inhalation solution 0.5 mg, 0.5 mg, Nebulization, TID, Sadie Sung MD, 0.5 mg at 03/21/25 0840    levalbuterol (XOPENEX) inhalation solution 1.25 mg, 1.25 mg, Nebulization, TID, Sadie Sung MD, 1.25 mg at 03/21/25 0840    ondansetron (ZOFRAN) injection 4 mg, 4 mg, Intravenous, Q6H PRN, Sadie Sung MD    polyethylene glycol (MIRALAX) packet 17 g, 17 g, Oral, Daily, Sadie Sung MD, 17 g at 03/20/25 0942    pravastatin (PRAVACHOL) tablet 80 mg, 80 mg, Oral, Daily With Dinner, DELIA Bowens, 80 mg at 03/20/25 1812    predniSONE tablet 10 mg, 10 mg, Oral, Daily, Nguyễn Liu DO, 10 mg at 03/21/25 0929    Social History     Socioeconomic History    Marital status: /Civil Union     Spouse name: Not on file    Number of children: Not on file    Years of education: Not on file    Highest education level: Not on file   Occupational History    Not on file   Tobacco Use    Smoking status: Former     Current packs/day: 0.00     Average packs/day: 2.5 packs/day for 42.0 years (105.0 ttl pk-yrs)     Types: Cigarettes     Start date: 1970     Quit  "date:      Years since quittin.2    Smokeless tobacco: Former    Tobacco comments:     1 ppd for 37 years, 2010 down to 5 cigs a day, is around second hand smoke   Vaping Use    Vaping status: Never Used   Substance and Sexual Activity    Alcohol use: Not Currently     Comment: rarely    Drug use: No    Sexual activity: Not Currently     Partners: Female   Other Topics Concern    Not on file   Social History Narrative    Daily coffee consumption: 8 or more cups a day        Used to work in [employer].  On disability.     Social Drivers of Health     Financial Resource Strain: Not on file   Food Insecurity: No Food Insecurity (2024)    Nursing - Inadequate Food Risk Classification     Worried About Running Out of Food in the Last Year: Never true     Ran Out of Food in the Last Year: Never true     Ran Out of Food in the Last Year: Not on file   Transportation Needs: No Transportation Needs (2024)    PRAPARE - Transportation     Lack of Transportation (Medical): No     Lack of Transportation (Non-Medical): No   Physical Activity: Not on file   Stress: Not on file   Social Connections: Not on file   Intimate Partner Violence: Not on file   Housing Stability: Low Risk  (2024)    Housing Stability Vital Sign     Unable to Pay for Housing in the Last Year: No     Number of Times Moved in the Last Year: 1     Homeless in the Last Year: No       Family History   Problem Relation Age of Onset    Diabetes Mother        Physical Exam:  Blood pressure 106/62, pulse 68, temperature 98.6 °F (37 °C), temperature source Axillary, resp. rate 16, height 5' 2\" (1.575 m), weight 44 kg (97 lb), SpO2 100%.  Body mass index is 17.74 kg/m².  Wt Readings from Last 3 Encounters:   25 44 kg (97 lb)   25 44.3 kg (97 lb 11.2 oz)   24 44.9 kg (99 lb)     General: Chronically ill-appearing and in respiratory distress at baseline  Neck: JVP elevated  Cardiovascular: Regular rate and rhythm " difficult to hear for murmur or gallops in setting of wheezing anteriorly  Lung: Wheezing, prolonged expiratory phase  Abdomen: Soft nontender nondistended  Extremities: Thin, no edema, warm.    Labs & Results:      Results from last 7 days   Lab Units 03/21/25  0513 03/20/25  0520 03/19/25  0529   WBC Thousand/uL 5.42 5.26 5.07   HEMOGLOBIN g/dL 11.9* 11.1* 10.8*   HEMATOCRIT % 38.6 36.3* 35.7*   PLATELETS Thousands/uL 158 157 157         Results from last 7 days   Lab Units 03/21/25  0513 03/20/25  1417 03/20/25  0520 03/19/25  0529 03/18/25  0521 03/17/25  0502 03/16/25  0509   POTASSIUM mmol/L 3.8 4.7 3.3*   < > 3.3* 3.7 3.9   CHLORIDE mmol/L 89* 86* 91*   < > 89* 88* 90*   CO2 mmol/L 45* >45* 43*   < > 45* >45* >45*   BUN mg/dL 34* 36* 42*   < > 40* 44* 49*   CREATININE mg/dL 0.56* 0.67 0.55*   < > 0.68 0.68 0.85   CALCIUM mg/dL 9.1 8.8 8.3*   < > 8.2* 8.5 8.2*   ALK PHOS U/L  --   --   --   --  44 50 51   ALT U/L  --   --   --   --  42 59* 64*   AST U/L  --   --   --   --  28 46* 63*    < > = values in this interval not displayed.     Results from last 7 days   Lab Units 03/14/25  1139   INR  0.84*           ________________________________________    Nguyễn Kevin MD, [facility]  Advanced Heart Failure and Transplant Cardiologist  Director of Cardio-Oncology  [facility]

## 2025-03-21 NOTE — PLAN OF CARE
Problem: Nutrition/Hydration-ADULT  Goal: Nutrient/Hydration intake appropriate for improving, restoring or maintaining nutritional needs  Description: Monitor and assess patient's nutrition/hydration status for malnutrition. Collaborate with interdisciplinary team and initiate plan and interventions as ordered.  Monitor patient's weight and dietary intake as ordered or per policy. Utilize nutrition screening tool and intervene as necessary. Determine patient's food preferences and provide high-protein, high-caloric foods as appropriate.     INTERVENTIONS:  - Monitor oral intake, urinary output, labs, and treatment plans  - Assess nutrition and hydration status and recommend course of action  - Evaluate amount of meals eaten  - Assist patient with eating if necessary   - Allow adequate time for meals  - Recommend/ encourage appropriate diets, oral nutritional supplements, and vitamin/mineral supplements  - Order, calculate, and assess calorie counts as needed  - Recommend, monitor, and adjust tube feedings and TPN/PPN based on assessed needs  - Assess need for intravenous fluids  - Provide specific nutrition/hydration education as appropriate  - Include patient/family/caregiver in decisions related to nutrition  Outcome: Progressing     Problem: Prexisting or High Potential for Compromised Skin Integrity  Goal: Skin integrity is maintained or improved  Description: INTERVENTIONS:  - Identify patients at risk for skin breakdown  - Assess and monitor skin integrity  - Assess and monitor nutrition and hydration status  - Monitor labs   - Assess for incontinence   - Turn and reposition patient  - Assist with mobility/ambulation  - Relieve pressure over bony prominences  - Avoid friction and shearing  - Provide appropriate hygiene as needed including keeping skin clean and dry  - Evaluate need for skin moisturizer/barrier cream  - Collaborate with interdisciplinary team   - Patient/family teaching  - Consider wound  care consult   Outcome: Progressing     Problem: INFECTION - ADULT  Goal: Absence or prevention of progression during hospitalization  Description: INTERVENTIONS:  - Assess and monitor for signs and symptoms of infection  - Monitor lab/diagnostic results  - Monitor all insertion sites, i.e. indwelling lines, tubes, and drains  - Monitor endotracheal if appropriate and nasal secretions for changes in amount and color  - [place] appropriate cooling/warming therapies per order  - Administer medications as ordered  - Instruct and encourage patient and family to use good hand hygiene technique  - Identify and instruct in appropriate isolation precautions for identified infection/condition  Outcome: Progressing  Goal: Absence of fever/infection during neutropenic period  Description: INTERVENTIONS:  - Monitor WBC    Outcome: Progressing

## 2025-03-21 NOTE — PROGRESS NOTES
Progress Note - Critical Care/ICU   Name: Natalio Hartmann Jr. 69 y.o. male I MRN: [id number]  Unit/Bed#: MICU 13 I Date of Admission: 3/14/2025   Date of Service: 3/21/2025 I Hospital Day: 7       Assessment & Plan   Active Hospital Problems    Diagnosis Date Noted POA    Acute on chronic respiratory failure with hypoxia and hypercapnia (HCC) 11/19/2020 Yes    Severe protein-calorie malnutrition (HCC) 10/14/2023 Yes    Metabolic encephalopathy 07/20/2023 Yes    Malnutrition (HCC) 06/10/2023 Yes    Hyperlipidemia 05/02/2023 Yes    COPD exacerbation (HCC) 04/28/2023 Yes    SIRS (systemic inflammatory response syndrome) 02/17/2023 Yes    Acute on chronic systolic congestive heart failure (HCC) 09/12/2022 Yes    Goals of care, counseling/discussion 02/25/2021 Not Applicable    Obstructive sleep apnea 07/29/2013 Yes      Resolved Hospital Problems   No resolved problems to display.       Neuro:   Diagnosis: Acute metabolic encephalopathy  Most likely 2/2 hypoxic hypercarbia   Resolved  Plan: CAM-ICU  Delirium precuations  Avoid neurotoxin  On bipap           CV:   Diagnosis: Acute on chronic HEFrEF  Nonischemic cardiomyopathy  BNP elevated 1079  Echo 03/16: EF 25% systolic function severely reduced severe global hypokinesis LA moderately dilated mild TR, no aortic stenosis, trace MR, mild TR normal RVSP, no pericardial effusion   Home diuretic regimen: Torsemide 20 mg alternating with 40 mg QOD  Outpatient cardiology notes review recommendation for palliative care   Plan:   IV Lasix 80 mg once daily  Monitor U/O: Goal - 1L in 24 hours  Monitor daily weights  Monitor BMP and electrolytes   Cardiology reccs apreciated     Pulm:  Diagnosis: Acute on chronic respiratory failure with hypoxia and hypercarbia  Multifactorial with COPD and CHF exacerbation  Bipap required in the ED  VBG in ED: 7.1/ 146  NC at home baseline 4L   Pro-Jorge Alberto negative BNP pending.   X-ray reviewed cardiomegaly, pulmonary congestion noted  Plan:   S/p IV  azithromycin  Continue supplemental oxygen goal oxygen >88%  Bipap at night and as needed during day  Encourage incentive spirometry  Diuretics for CHF exacerbation and respiratory treatment for COPD  S/p Diamox 2 doses  PO Prednisone 10 mg         COPD exacerbation:  Vbg 7.1/146  Required Bipap in the ED   Plan:   Continue supplemental oxygen goal oxygen >88%  Bipap at night and as needed during day  Encourage incentive spirometry  respiratory treatment for COPD   PO Prednisone 40 mg         KEVIN:  On trilogy NIV at night  Bipap at night      GI:   Diagnosis: Severe protein-calorie malnutrition  Plan:Nutrition reccs appreciated        Diagnosis: Elevated transaminitis most likely secondary to congestive hepatopathy  Improving  Plan: Monitor CMP     :   No active issues     F/E/N:   F: None  E: Replete electrolytes as needed   N: Dysphagia diet      Heme/Onc:   Diagnosis: Macrocytic anemia, chronic  Baseline around 9ish    Most likely nutritional deficiency  Plan: Monitor Hgb and transfuse <7  Continue DVT ppx with heparin sq     Endo:   No active issues     ID:   Diagnosis: SIRS Criteria  Met SIRS criteria upon admission   Sepsis workup done admission  Procal 0.4  B cx: negative 5 days  S/p Empiric treatment with ceftraixone for PNA  Plan:   Continue CTX for PNA        MSK/Skin:   No active issues  Disposition: Stepdown Level 2    ICU Core Measures     A: Assess, Prevent, and Manage Pain Has pain been assessed? Yes  Need for changes to pain regimen? No   B: Both SAT/SAT  N/A   C: Choice of Sedation RASS Goal: 0 Alert and Calm or N/A patient not on sedation  Need for changes to sedation or analgesia regimen? NA   D: Delirium CAM-ICU: Negative   E: Early Mobility  Plan for early mobility? Yes   F: Family Engagement Plan for family engagement today? Yes         Prophylaxis:  VTE VTE covered by:  heparin (porcine), Subcutaneous, 5,000 Units at 03/21/25 0514       Stress Ulcer  not ordered         24 Hour Events  : No events overnight  Subjective       Objective :                   Vitals I/O      Most Recent Min/Max in 24hrs   Temp 98.6 °F (37 °C) Temp  Min: 97.7 °F (36.5 °C)  Max: 98.6 °F (37 °C)   Pulse 59 Pulse  Min: 59  Max: 88   Resp 20 Resp  Min: 18  Max: 36   BP 96/62 BP  Min: 91/65  Max: 128/78   O2 Sat 100 % SpO2  Min: 96 %  Max: 100 %      Intake/Output Summary (Last 24 hours) at 3/21/2025 0730  Last data filed at 3/21/2025 0516  Gross per 24 hour   Intake 900 ml   Output 2250 ml   Net -1350 ml       Diet Dysphagia/Modified Consistency; Dysphagia 2-Mechanical Soft; Thin Liquid    Invasive Monitoring           Physical Exam   Physical Exam  Eyes:      Pupils: Pupils are equal, round, and reactive to light.   Skin:     General: Skin is warm.   HENT:      Mouth/Throat:      Mouth: Mucous membranes are moist.   Cardiovascular:      Rate and Rhythm: Normal rate and regular rhythm.      Pulses: Normal pulses.   Musculoskeletal:      Right lower leg: No edema.      Left lower leg: No edema.   Abdominal:      Palpations: Abdomen is soft.   Constitutional:       Comments: Cachectic   Pulmonary:      Effort: Pulmonary effort is normal.      Breath sounds: Normal breath sounds. No wheezing.   Neurological:      Mental Status: He is oriented to person, place and time.          Diagnostic Studies        Lab Results: I have reviewed the following results:     Medications:  Scheduled PRN   aspirin, 81 mg, Daily  budesonide, 0.5 mg, Q12H  chlorhexidine, 15 mL, Q12H GABE  docusate sodium, 100 mg, BID  formoterol, 20 mcg, Q12H  furosemide, 80 mg, Daily  heparin (porcine), 5,000 Units, Q8H GABE  ipratropium, 0.5 mg, TID  levalbuterol, 1.25 mg, TID  polyethylene glycol, 17 g, Daily  pravastatin, 80 mg, Daily With Dinner  predniSONE, 10 mg, Daily      acetaminophen, 650 mg, Q6H PRN  aluminum-magnesium hydroxide-simethicone, 30 mL, Q6H PRN  ondansetron, 4 mg, Q6H PRN       Continuous          Labs:   CBC    Recent Labs     03/20/25  0520  03/21/25  0513   WBC 5.26 5.42   HGB 11.1* 11.9*   HCT 36.3* 38.6    158     BMP    Recent Labs     03/20/25 0520 03/20/25 1417 03/21/25  0513   SODIUM 138 138 139   K 3.3* 4.7 3.8   CL 91* 86* 89*   CO2 43* >45* 45*   AGAP 4  --  5   BUN 42* 36* 34*   CREATININE 0.55* 0.67 0.56*   CALCIUM 8.3* 8.8 9.1       Coags    No recent results     Additional Electrolytes  Recent Labs     03/20/25 0520 03/20/25 1417 03/21/25  0513   MG 2.0  --  2.2   PHOS 1.9* 5.7* 3.6          Blood Gas    No recent results  Recent Labs     03/19/25 2127   PHVEN 7.344   HPT7PIM 83.4*   PO2VEN 58.3*   LIV6SUV 44.4*   BEVEN 14.8   U8BMPUQ 87.4*    LFTs  No recent results    Infectious  No recent results  Glucose  Recent Labs     03/20/25  0520 03/20/25  1417 03/21/25  0513   GLUC 123 129 93

## 2025-03-22 LAB
ANION GAP SERPL CALCULATED.3IONS-SCNC: 5 MMOL/L (ref 4–13)
BASOPHILS # BLD AUTO: 0 THOUSANDS/ÂΜL (ref 0–0.1)
BASOPHILS NFR BLD AUTO: 0 % (ref 0–1)
BUN SERPL-MCNC: 35 MG/DL (ref 5–25)
CALCIUM SERPL-MCNC: 8.9 MG/DL (ref 8.4–10.2)
CHLORIDE SERPL-SCNC: 89 MMOL/L (ref 96–108)
CO2 SERPL-SCNC: 44 MMOL/L (ref 21–32)
CREAT SERPL-MCNC: 0.6 MG/DL (ref 0.6–1.3)
EOSINOPHIL # BLD AUTO: 0.05 THOUSAND/ÂΜL (ref 0–0.61)
EOSINOPHIL NFR BLD AUTO: 1 % (ref 0–6)
ERYTHROCYTE [DISTWIDTH] IN BLOOD BY AUTOMATED COUNT: 13.2 % (ref 11.6–15.1)
GFR SERPL CREATININE-BSD FRML MDRD: 104 ML/MIN/1.73SQ M
GLUCOSE SERPL-MCNC: 68 MG/DL (ref 65–140)
HCT VFR BLD AUTO: 39.9 % (ref 36.5–49.3)
HGB BLD-MCNC: 12.1 G/DL (ref 12–17)
IMM GRANULOCYTES # BLD AUTO: 0.03 THOUSAND/UL (ref 0–0.2)
IMM GRANULOCYTES NFR BLD AUTO: 0 % (ref 0–2)
LYMPHOCYTES # BLD AUTO: 0.72 THOUSANDS/ÂΜL (ref 0.6–4.47)
LYMPHOCYTES NFR BLD AUTO: 9 % (ref 14–44)
MAGNESIUM SERPL-MCNC: 2 MG/DL (ref 1.9–2.7)
MCH RBC QN AUTO: 30.1 PG (ref 26.8–34.3)
MCHC RBC AUTO-ENTMCNC: 30.3 G/DL (ref 31.4–37.4)
MCV RBC AUTO: 99 FL (ref 82–98)
MONOCYTES # BLD AUTO: 0.71 THOUSAND/ÂΜL (ref 0.17–1.22)
MONOCYTES NFR BLD AUTO: 9 % (ref 4–12)
NEUTROPHILS # BLD AUTO: 6.45 THOUSANDS/ÂΜL (ref 1.85–7.62)
NEUTS SEG NFR BLD AUTO: 81 % (ref 43–75)
NRBC BLD AUTO-RTO: 0 /100 WBCS
PHOSPHATE SERPL-MCNC: 2.8 MG/DL (ref 2.3–4.1)
PLATELET # BLD AUTO: 168 THOUSANDS/UL (ref 149–390)
PMV BLD AUTO: 10.5 FL (ref 8.9–12.7)
POTASSIUM SERPL-SCNC: 4 MMOL/L (ref 3.5–5.3)
RBC # BLD AUTO: 4.02 MILLION/UL (ref 3.88–5.62)
SODIUM SERPL-SCNC: 138 MMOL/L (ref 135–147)
WBC # BLD AUTO: 7.96 THOUSAND/UL (ref 4.31–10.16)

## 2025-03-22 PROCEDURE — 94640 AIRWAY INHALATION TREATMENT: CPT

## 2025-03-22 PROCEDURE — 84100 ASSAY OF PHOSPHORUS: CPT

## 2025-03-22 PROCEDURE — 99232 SBSQ HOSP IP/OBS MODERATE 35: CPT | Performed by: INTERNAL MEDICINE

## 2025-03-22 PROCEDURE — 83735 ASSAY OF MAGNESIUM: CPT

## 2025-03-22 PROCEDURE — 94760 N-INVAS EAR/PLS OXIMETRY 1: CPT

## 2025-03-22 PROCEDURE — 94660 CPAP INITIATION&MGMT: CPT

## 2025-03-22 PROCEDURE — 85025 COMPLETE CBC W/AUTO DIFF WBC: CPT

## 2025-03-22 PROCEDURE — 80048 BASIC METABOLIC PNL TOTAL CA: CPT

## 2025-03-22 RX ADMIN — IPRATROPIUM BROMIDE 0.5 MG: 0.5 SOLUTION RESPIRATORY (INHALATION) at 07:44

## 2025-03-22 RX ADMIN — HEPARIN SODIUM 5000 UNITS: 5000 INJECTION INTRAVENOUS; SUBCUTANEOUS at 06:20

## 2025-03-22 RX ADMIN — PRAVASTATIN SODIUM 80 MG: 80 TABLET ORAL at 17:09

## 2025-03-22 RX ADMIN — FUROSEMIDE 80 MG: 10 INJECTION, SOLUTION INTRAMUSCULAR; INTRAVENOUS at 08:22

## 2025-03-22 RX ADMIN — IPRATROPIUM BROMIDE 0.5 MG: 0.5 SOLUTION RESPIRATORY (INHALATION) at 14:27

## 2025-03-22 RX ADMIN — FORMOTEROL FUMARATE DIHYDRATE 20 MCG: 20 SOLUTION RESPIRATORY (INHALATION) at 08:02

## 2025-03-22 RX ADMIN — DOCUSATE SODIUM 100 MG: 100 CAPSULE, LIQUID FILLED ORAL at 17:08

## 2025-03-22 RX ADMIN — PREDNISONE 10 MG: 10 TABLET ORAL at 08:17

## 2025-03-22 RX ADMIN — LEVALBUTEROL HYDROCHLORIDE 1.25 MG: 1.25 SOLUTION RESPIRATORY (INHALATION) at 07:44

## 2025-03-22 RX ADMIN — ASPIRIN 81 MG CHEWABLE TABLET 81 MG: 81 TABLET CHEWABLE at 08:17

## 2025-03-22 RX ADMIN — POLYETHYLENE GLYCOL 3350 17 G: 17 POWDER, FOR SOLUTION ORAL at 08:17

## 2025-03-22 RX ADMIN — LEVALBUTEROL HYDROCHLORIDE 1.25 MG: 1.25 SOLUTION RESPIRATORY (INHALATION) at 20:02

## 2025-03-22 RX ADMIN — BUDESONIDE 0.5 MG: 0.5 INHALANT RESPIRATORY (INHALATION) at 20:02

## 2025-03-22 RX ADMIN — BUDESONIDE 0.5 MG: 0.5 INHALANT RESPIRATORY (INHALATION) at 07:44

## 2025-03-22 RX ADMIN — HEPARIN SODIUM 5000 UNITS: 5000 INJECTION INTRAVENOUS; SUBCUTANEOUS at 21:06

## 2025-03-22 RX ADMIN — CHLORHEXIDINE GLUCONATE 15 ML: 1.2 SOLUTION ORAL at 09:32

## 2025-03-22 RX ADMIN — DOCUSATE SODIUM 100 MG: 100 CAPSULE, LIQUID FILLED ORAL at 08:17

## 2025-03-22 RX ADMIN — LEVALBUTEROL HYDROCHLORIDE 1.25 MG: 1.25 SOLUTION RESPIRATORY (INHALATION) at 14:27

## 2025-03-22 RX ADMIN — FORMOTEROL FUMARATE DIHYDRATE 20 MCG: 20 SOLUTION RESPIRATORY (INHALATION) at 20:02

## 2025-03-22 RX ADMIN — HEPARIN SODIUM 5000 UNITS: 5000 INJECTION INTRAVENOUS; SUBCUTANEOUS at 13:07

## 2025-03-22 RX ADMIN — IPRATROPIUM BROMIDE 0.5 MG: 0.5 SOLUTION RESPIRATORY (INHALATION) at 20:02

## 2025-03-22 NOTE — NUTRITION
Nutrition Follow-Up:       03/21/25 7823   Recommendations/Interventions   Intervention Comments Adjusted Ensure Max to Ensure Plus High Protein, which provides more calories and is a lower volume. Varied flavors.   Recommendations to Provider Consider partial diet liberalization to 4g sodium diet/no added salt if medically able, to encourage PO intake.

## 2025-03-22 NOTE — PROGRESS NOTES
Heart Failure Cardiology - Inpatient Consult Follow up Note    Natalio Hartmann Jr.  MRN: [id number]  Unit/Bed#: MICU 13; Encounter: [id number]      Assessment / Plan:  69 y.o. male with a PMH of severe COPD, chronic hypoxic and hypercapnic respiratory failure on 4 L supplemental oxygen, cachexia, nonischemic cardiomyopathy, HFrEF --   who presents to the ER 3-14-25 with worsening shortness of breath,  increasingly lethargic feeling, and fatigue.  On day of admit, he was visibly short of breath slumped in the chair noted by his wife and brought to the ER.   In the ED he was noted to be hypoxic and hypercapnic requiring BiPAP.   BNP on admit>1000 (higher than priors).  CXR showed pulmonary congestion.  Unable to wean off Bipap--admitted to MICU.   Heart failure team consulted.      # Acute on Chronic Respiratory Failure with hypoxia and hypercapnia  # COPD exacerbation  -Former smoker; 105 pack years, quit in 2012.   -Multiple hospitalizations with acute COPD exacerbations   -Required BiPAP in the ER--CO2 on admit 140--> now 83  -Multifactorial with COPD and CHF exacerbation contributing (COPD > CHF) +/- PNA  -treatment of COPD exacerbation per pulmonary / primary team  -completed treatment for possible PNA with ceftriaxone x 7 days  -treatment of CHF as below     # HFrEF - acute on chronic     ETIOLOGY:  - LVEF 25%; NYHA III; ACC/AHA Stage C/D   - Nonischemic cardiomyopathy.  Possible dyssynchrony from chronic LBBB.  Despite QRS only being 120 ms, echo shows significant dyssynchrony.     VOLUME:  - home diuretic:  torsemide 40 mg QD alternating with 20 mg QD   - inpatient diuretic:  IVC dilated on echo.  BNP higher than priors.  Recommend continuing IV diuresis for net negative volume status.  Consider increasing Lasix from once daily to twice daily.    GDMT:    (Limited by low BP)  - not currently on GDMT    DEVICE:  - he does not have an ICD.   Severe lung disease with life expectancy < 1 year.      # Metabolic Encephalopathy (HCC)  - on admit likely d/t Hypercapnia, hypoxia     # Nonobstructive CAD  Has coronary calcium on CT  - on aspirin  - on pravastatin     # Hyperlipidemia  - on pravastatin     # KEVIN   On BiPAP nightly     # Cachexia  -BMI 17.7     # GOC.    -palliative care discussed. He is not interested at this time.  Recommend continued discussions.        Today's Plan Summary:  Recommend continuing IV diuresis for net negative volume status.  Consider increasing Lasix from once daily to twice daily.    Management of COPD exacerbation and severe lung disease per primary team    Overall, short-term prognosis is concerning and long-term prognosis is guarded.  Recommend palliative care and discussions about home hospice.                     Interval Events:  No overnight events, on BiPAP this morning.  Hemodynamically stable.  Slightly net negative.    Subjective:  Continued shortness of breath.  No chest pain. No syncope.    Objective:  Vitals:        reviewed all vitals past 24 hours.  See epic.  I+O's:         neg 445 cc  Weights:    98 lbs  Telemetry:  sinus rhythm with occasional atrial runs    Cardiac Imaging personally reviewed:  Echo 3-16-25  LVIDd 6.1cm.  EF 25%.  Grade 1 DD.  abnormal septal motion consistent with left bundle branch block.   RV is moderately dilated. Systolic function is low normal.   Mild TR.    RVSP 42.      Patient Active Problem List    Diagnosis Date Noted    Iron deficiency anemia secondary to inadequate dietary iron intake 05/17/2024    SIADH (syndrome of inappropriate ADH production) ([facility]) 05/17/2024    Type 2 diabetes mellitus without complication, with long-term current use of insulin ([facility]) 05/17/2024    End stage COPD ([facility]) 02/08/2024    Dependence on respirator (ventilator) status ([facility]) 01/10/2024    Severe protein-calorie malnutrition ([facility]) 10/14/2023    History of bladder cancer 07/31/2023    Pulmonary cachexia due to COPD ([facility])     Chronic hypercapnia/KEVIN  07/20/2023    Metabolic encephalopathy 07/20/2023    Palliative care patient 06/13/2023    Alkalosis 06/12/2023    Malnutrition (HCC) 06/10/2023    Hyperlipidemia 05/02/2023    COPD exacerbation ([facility]) 04/28/2023    Steroid-induced hyperglycemia 03/30/2023    Physical deconditioning 02/21/2023    Chronic anemia 02/17/2023    SIRS (systemic inflammatory response syndrome) 02/17/2023    Hyponatremia 02/17/2023    Acute on chronic systolic congestive heart failure (HCC) 09/12/2022    Pulmonary nodules/lesions, multiple 03/27/2022    Prostate cancer ([facility]) 05/24/2021    BPH with obstruction/lower urinary tract symptoms 04/13/2021    Goals of care, counseling/discussion 02/25/2021    Acute on chronic respiratory failure with hypoxia and hypercapnia ([facility]) 11/19/2020    Macrocytosis without anemia 05/23/2019    Other insomnia 02/18/2019    GERD (gastroesophageal reflux disease) 01/05/2017    Nonobstructive atherosclerosis of coronary artery 02/25/2016    Mood disorder ([facility]) 08/18/2015    Prediabetes 02/19/2015    Osteoporosis 10/14/2014    Vitamin D deficiency 10/14/2014    Nonischemic cardiomyopathy ([facility]) 09/26/2014    Left bundle-branch block 08/03/2013    Osteoarthritis 08/03/2013    Obstructive sleep apnea 07/29/2013       Past Medical History:   Diagnosis Date    Acute metabolic encephalopathy 03/29/2022    Arthritis     Bladder mass     Cardiomyopathy ([facility])     Chest pain     COPD (chronic obstructive pulmonary disease) ([facility])     COVID-19 virus infection 02/23/2023    CPAP (continuous positive airway pressure) dependence     Emphysema of lung ([facility])     Hypoxia     nocturnal    Left bundle branch block     Multiple pulmonary nodules     last assessed: 10/12/16    Pneumonia     Sleep apnea, obstructive     Smoker     Weight loss 08/29/2019       No Known Allergies      Current Facility-Administered Medications:     acetaminophen (TYLENOL) tablet 650 mg, 650 mg, Oral, Q6H PRN, Sadie Sung MD     aluminum-magnesium hydroxide-simethicone (MAALOX) oral suspension 30 mL, 30 mL, Oral, Q6H PRN, Sadie Sung MD    aspirin chewable tablet 81 mg, 81 mg, Oral, Daily, DELIA Bowens, 81 mg at 03/21/25 0929    budesonide (PULMICORT) inhalation solution 0.5 mg, 0.5 mg, Nebulization, Q12H, Sadie Sung MD, 0.5 mg at 03/22/25 0744    chlorhexidine (PERIDEX) 0.12 % oral rinse 15 mL, 15 mL, Mouth/Throat, Q12H [facility], 15 mL at 03/21/25 2038    docusate sodium (COLACE) capsule 100 mg, 100 mg, Oral, BID, Sadie Sung MD, 100 mg at 03/20/25 0942    formoterol (PERFOROMIST) nebulizer solution 20 mcg, 20 mcg, Nebulization, Q12H, Yasmin Bennett MD, 20 mcg at 03/22/25 0802    furosemide (LASIX) injection 80 mg, 80 mg, Intravenous, Daily, Richelle Martines MD, 80 mg at 03/21/25 0929    heparin (porcine) subcutaneous injection 5,000 Units, 5,000 Units, Subcutaneous, Q8H [facility], 5,000 Units at 03/22/25 0620    ipratropium (ATROVENT) 0.02 % inhalation solution 0.5 mg, 0.5 mg, Nebulization, TID, Sadie Sung MD, 0.5 mg at 03/22/25 0744    levalbuterol (XOPENEX) inhalation solution 1.25 mg, 1.25 mg, Nebulization, TID, Sadie Sung MD, 1.25 mg at 03/22/25 0744    ondansetron (ZOFRAN) injection 4 mg, 4 mg, Intravenous, Q6H PRN, Sadie Sung MD    polyethylene glycol (MIRALAX) packet 17 g, 17 g, Oral, Daily, Sadie Sung MD, 17 g at 03/20/25 0942    pravastatin (PRAVACHOL) tablet 80 mg, 80 mg, Oral, Daily With Dinner, DELIA Bowens, 80 mg at 03/20/25 1812    predniSONE tablet 10 mg, 10 mg, Oral, Daily, Nguyễn Liu DO, 10 mg at 03/21/25 0929    Social History     Socioeconomic History    Marital status: /Civil Union     Spouse name: Not on file    Number of children: Not on file    Years of education: Not on file    Highest education level: Not on file   Occupational History    Not on file   Tobacco Use     "Smoking status: Former     Current packs/day: 0.00     Average packs/day: 2.5 packs/day for 42.0 years (105.0 ttl pk-yrs)     Types: Cigarettes     Start date:      Quit date:      Years since quittin.2    Smokeless tobacco: Former    Tobacco comments:     1 ppd for 37 years, 2010 down to 5 cigs a day, is around second hand smoke   Vaping Use    Vaping status: Never Used   Substance and Sexual Activity    Alcohol use: Not Currently     Comment: rarely    Drug use: No    Sexual activity: Not Currently     Partners: Female   Other Topics Concern    Not on file   Social History Narrative    Daily coffee consumption: 8 or more cups a day        Used to work in [employer].  On disability.     Social Drivers of Health     Financial Resource Strain: Not on file   Food Insecurity: No Food Insecurity (2024)    Nursing - Inadequate Food Risk Classification     Worried About Running Out of Food in the Last Year: Never true     Ran Out of Food in the Last Year: Never true     Ran Out of Food in the Last Year: Not on file   Transportation Needs: No Transportation Needs (2024)    PRAPARE - Transportation     Lack of Transportation (Medical): No     Lack of Transportation (Non-Medical): No   Physical Activity: Not on file   Stress: Not on file   Social Connections: Not on file   Intimate Partner Violence: Not on file   Housing Stability: Low Risk  (2024)    Housing Stability Vital Sign     Unable to Pay for Housing in the Last Year: No     Number of Times Moved in the Last Year: 1     Homeless in the Last Year: No       Family History   Problem Relation Age of Onset    Diabetes Mother        Physical Exam:  Blood pressure 105/71, pulse 61, temperature (!) 97.4 °F (36.3 °C), temperature source Oral, resp. rate (!) 23, height 5' 2\" (1.575 m), weight 44.5 kg (98 lb 1.7 oz), SpO2 99%.  Body mass index is 17.94 kg/m².  Wt Readings from Last 3 Encounters:   25 44.5 kg (98 lb 1.7 oz)   25 " 44.3 kg (97 lb 11.2 oz)   09/06/24 44.9 kg (99 lb)     General: Chronically ill-appearing.  On BiPAP.  Neck: JVP elevated  Cardiovascular: Regular rate and rhythm difficult to hear for murmur or gallops in setting of wheezing anteriorly and bipap machine.  Lung: Wheezing, prolonged expiratory phase  Abdomen: Soft nontender nondistended  Extremities: Thin, no edema, warm.    Labs & Results:      Results from last 7 days   Lab Units 03/22/25  0448 03/21/25  0513 03/20/25  0520   WBC Thousand/uL 7.96 5.42 5.26   HEMOGLOBIN g/dL 12.1 11.9* 11.1*   HEMATOCRIT % 39.9 38.6 36.3*   PLATELETS Thousands/uL 168 158 157         Results from last 7 days   Lab Units 03/22/25  0448 03/21/25  0513 03/20/25  1417 03/19/25  0529 03/18/25  0521 03/17/25  0502 03/16/25  0509   POTASSIUM mmol/L 4.0 3.8 4.7   < > 3.3* 3.7 3.9   CHLORIDE mmol/L 89* 89* 86*   < > 89* 88* 90*   CO2 mmol/L 44* 45* >45*   < > 45* >45* >45*   BUN mg/dL 35* 34* 36*   < > 40* 44* 49*   CREATININE mg/dL 0.60 0.56* 0.67   < > 0.68 0.68 0.85   CALCIUM mg/dL 8.9 9.1 8.8   < > 8.2* 8.5 8.2*   ALK PHOS U/L  --   --   --   --  44 50 51   ALT U/L  --   --   --   --  42 59* 64*   AST U/L  --   --   --   --  28 46* 63*    < > = values in this interval not displayed.                 ________________________________________    Nguyễn Kevin MD, [facility]  Advanced Heart Failure and Transplant Cardiologist  Director of Cardio-Oncology  [facility]

## 2025-03-22 NOTE — PROGRESS NOTES
Progress Note - Critical Care/ICU   Name: Natalio Hartmann Jr. 69 y.o. male I MRN: [id number]  Unit/Bed#: MICU 13 I Date of Admission: 3/14/2025   Date of Service: 3/22/2025 I Hospital Day: 8       Assessment & Plan   Active Hospital Problems    Diagnosis Date Noted POA    Acute on chronic respiratory failure with hypoxia and hypercapnia (HCC) 11/19/2020 Yes    Severe protein-calorie malnutrition (HCC) 10/14/2023 Yes    Metabolic encephalopathy 07/20/2023 Yes    Malnutrition (HCC) 06/10/2023 Yes    Hyperlipidemia 05/02/2023 Yes    COPD exacerbation (HCC) 04/28/2023 Yes    SIRS (systemic inflammatory response syndrome) 02/17/2023 Yes    Acute on chronic systolic congestive heart failure (HCC) 09/12/2022 Yes    Goals of care, counseling/discussion 02/25/2021 Not Applicable    Obstructive sleep apnea 07/29/2013 Yes      Resolved Hospital Problems   No resolved problems to display.       Neuro:   Diagnosis: Acute metabolic encephalopathy  Most likely 2/2 hypoxic hypercarbia   Resolved  Plan: CAM-ICU  Delirium precuations  Avoid neurotoxin  On bipap           CV:   Diagnosis: Acute on chronic HEFrEF  Nonischemic cardiomyopathy  BNP elevated 1079  Echo 03/16: EF 25% systolic function severely reduced severe global hypokinesis LA moderately dilated mild TR, no aortic stenosis, trace MR, mild TR normal RVSP, no pericardial effusion   Home diuretic regimen: Torsemide 20 mg alternating with 40 mg QOD  Outpatient cardiology notes review recommendation for palliative care   Plan:   IV Lasix 80 mg once daily  Monitor U/O: Goal - 1L in 24 hours  Monitor daily weights  Monitor BMP and electrolytes   Cardiology reccs apreciated     Pulm:  Diagnosis: Acute on chronic respiratory failure with hypoxia and hypercarbia  Multifactorial with COPD and CHF exacerbation  Bipap required in the ED  VBG in ED: 7.1/ 146  NC at home baseline 4L   Pro-Jorge Alberto negative BNP pending.   X-ray reviewed cardiomegaly, pulmonary congestion noted  Plan:   S/p IV  azithromycin  Continue supplemental oxygen goal oxygen >88%  Bipap at night and as needed during day  Encourage incentive spirometry  Diuretics for CHF exacerbation and respiratory treatment for COPD  S/p Diamox 2 doses  PO Prednisone 10 mg         COPD exacerbation:  Vbg 7.1/146  Required Bipap in the ED   Plan:   Continue supplemental oxygen goal oxygen >88%  Bipap at night and as needed during day  Encourage incentive spirometry  respiratory treatment for COPD   PO Prednisone 10 mg         KEVIN:  On trilogy NIV at night  Bipap at night      GI:   Diagnosis: Severe protein-calorie malnutrition  Plan:Nutrition reccs appreciated        Diagnosis: Elevated transaminitis most likely secondary to congestive hepatopathy  Improving  Plan: Monitor CMP     :   No active issues     F/E/N:   F: None  E: Replete electrolytes as needed   N: Dysphagia diet      Heme/Onc:   Diagnosis: Macrocytic anemia, chronic  Baseline around 9ish    Most likely nutritional deficiency  Plan: Monitor Hgb and transfuse <7  Continue DVT ppx with heparin sq     Endo:   No active issues     ID:   Diagnosis: SIRS Criteria  Met SIRS criteria upon admission   Sepsis workup done admission  Procal 0.4  B cx: negative 5 days  S/p Empiric treatment with ceftraixone for PNA  Plan:   Completed CTX for PNA        MSK/Skin:   No active issues  Disposition: Med Surg with Telemetry    ICU Core Measures     A: Assess, Prevent, and Manage Pain Has pain been assessed? NA  Need for changes to pain regimen? NA   B: Both SAT/SAT  N/A   C: Choice of Sedation RASS Goal: N/A patient not on sedation  Need for changes to sedation or analgesia regimen? NA   D: Delirium CAM-ICU: Negative   E: Early Mobility  Plan for early mobility? Yes   F: Family Engagement Plan for family engagement today? Yes         Prophylaxis:  VTE VTE covered by:  heparin (porcine), Subcutaneous, 5,000 Units at 03/22/25 0620       Stress Ulcer  not ordered         24 Hour Events : No events  overnight  Subjective   Review of Systems: See HPI for Review of Systems    Objective :                   Vitals I/O      Most Recent Min/Max in 24hrs   Temp (!) 97.4 °F (36.3 °C) Temp  Min: 97.4 °F (36.3 °C)  Max: 97.7 °F (36.5 °C)   Pulse 61 Pulse  Min: 61  Max: 87   Resp (!) 23 Resp  Min: 16  Max: 32   /71 BP  Min: 91/65  Max: 126/74   O2 Sat 99 % SpO2  Min: 91 %  Max: 100 %      Intake/Output Summary (Last 24 hours) at 3/22/2025 0639  Last data filed at 3/22/2025 0504  Gross per 24 hour   Intake 780 ml   Output 1225 ml   Net -445 ml       Diet Regular; Regular House; Fluid Restriction 1500 ML, Sodium 2 GM    Invasive Monitoring           Physical Exam   Physical Exam  Vitals reviewed.   Eyes:      Pupils: Pupils are equal, round, and reactive to light.   HENT:      Head: Normocephalic.   Cardiovascular:      Rate and Rhythm: Normal rate and regular rhythm.      Pulses: Normal pulses.   Musculoskeletal:      Right lower leg: No edema.      Left lower leg: No edema.   Abdominal:      Palpations: Abdomen is soft.   Constitutional:       Appearance: He is ill-appearing.      Comments: cachectic   Pulmonary:      Effort: Pulmonary effort is normal.      Breath sounds: Normal breath sounds.   Neurological:      Mental Status: He is oriented to person, place and time.          Diagnostic Studies        Lab Results: I have reviewed the following results:     Medications:  Scheduled PRN   aspirin, 81 mg, Daily  budesonide, 0.5 mg, Q12H  chlorhexidine, 15 mL, Q12H GABE  docusate sodium, 100 mg, BID  formoterol, 20 mcg, Q12H  furosemide, 80 mg, Daily  heparin (porcine), 5,000 Units, Q8H GABE  ipratropium, 0.5 mg, TID  levalbuterol, 1.25 mg, TID  polyethylene glycol, 17 g, Daily  pravastatin, 80 mg, Daily With Dinner  predniSONE, 10 mg, Daily      acetaminophen, 650 mg, Q6H PRN  aluminum-magnesium hydroxide-simethicone, 30 mL, Q6H PRN  ondansetron, 4 mg, Q6H PRN       Continuous          Labs:   CBC    Recent Labs      03/21/25  0513 03/22/25  0448   WBC 5.42 7.96   HGB 11.9* 12.1   HCT 38.6 39.9    168     BMP    Recent Labs     03/21/25  0513 03/22/25  0448   SODIUM 139 138   K 3.8 4.0   CL 89* 89*   CO2 45* 44*   AGAP 5 5   BUN 34* 35*   CREATININE 0.56* 0.60   CALCIUM 9.1 8.9       Coags    No recent results     Additional Electrolytes  Recent Labs     03/21/25  0513 03/21/25  1310 03/22/25  0448   MG 2.2  --  2.0   PHOS 3.6 4.6* 2.8          Blood Gas    No recent results  No recent results LFTs  No recent results    Infectious  No recent results  Glucose  Recent Labs     03/20/25  1417 03/21/25  0513 03/22/25  0448   GLUC 129 93 68

## 2025-03-23 LAB
BASOPHILS # BLD AUTO: 0 THOUSANDS/ÂΜL (ref 0–0.1)
BASOPHILS NFR BLD AUTO: 0 % (ref 0–1)
BUN SERPL-MCNC: 33 MG/DL (ref 5–25)
CALCIUM SERPL-MCNC: 9 MG/DL (ref 8.4–10.2)
CHLORIDE SERPL-SCNC: 86 MMOL/L (ref 96–108)
CO2 SERPL-SCNC: >45 MMOL/L (ref 21–32)
CREAT SERPL-MCNC: 0.73 MG/DL (ref 0.6–1.3)
EOSINOPHIL # BLD AUTO: 0.1 THOUSAND/ÂΜL (ref 0–0.61)
EOSINOPHIL NFR BLD AUTO: 2 % (ref 0–6)
ERYTHROCYTE [DISTWIDTH] IN BLOOD BY AUTOMATED COUNT: 13.1 % (ref 11.6–15.1)
GFR SERPL CREATININE-BSD FRML MDRD: 95 ML/MIN/1.73SQ M
GLUCOSE SERPL-MCNC: 99 MG/DL (ref 65–140)
HCT VFR BLD AUTO: 36.8 % (ref 36.5–49.3)
HGB BLD-MCNC: 11.5 G/DL (ref 12–17)
IMM GRANULOCYTES # BLD AUTO: 0.02 THOUSAND/UL (ref 0–0.2)
IMM GRANULOCYTES NFR BLD AUTO: 0 % (ref 0–2)
LYMPHOCYTES # BLD AUTO: 0.88 THOUSANDS/ÂΜL (ref 0.6–4.47)
LYMPHOCYTES NFR BLD AUTO: 14 % (ref 14–44)
MAGNESIUM SERPL-MCNC: 2.1 MG/DL (ref 1.9–2.7)
MCH RBC QN AUTO: 30.4 PG (ref 26.8–34.3)
MCHC RBC AUTO-ENTMCNC: 31.3 G/DL (ref 31.4–37.4)
MCV RBC AUTO: 97 FL (ref 82–98)
MONOCYTES # BLD AUTO: 0.64 THOUSAND/ÂΜL (ref 0.17–1.22)
MONOCYTES NFR BLD AUTO: 11 % (ref 4–12)
NEUTROPHILS # BLD AUTO: 4.45 THOUSANDS/ÂΜL (ref 1.85–7.62)
NEUTS SEG NFR BLD AUTO: 73 % (ref 43–75)
NRBC BLD AUTO-RTO: 0 /100 WBCS
PHOSPHATE SERPL-MCNC: 2 MG/DL (ref 2.3–4.1)
PLATELET # BLD AUTO: 161 THOUSANDS/UL (ref 149–390)
PMV BLD AUTO: 10.5 FL (ref 8.9–12.7)
POTASSIUM SERPL-SCNC: 3.8 MMOL/L (ref 3.5–5.3)
RBC # BLD AUTO: 3.78 MILLION/UL (ref 3.88–5.62)
SODIUM SERPL-SCNC: 135 MMOL/L (ref 135–147)
WBC # BLD AUTO: 6.09 THOUSAND/UL (ref 4.31–10.16)

## 2025-03-23 PROCEDURE — 85025 COMPLETE CBC W/AUTO DIFF WBC: CPT

## 2025-03-23 PROCEDURE — 80048 BASIC METABOLIC PNL TOTAL CA: CPT

## 2025-03-23 PROCEDURE — 94003 VENT MGMT INPAT SUBQ DAY: CPT

## 2025-03-23 PROCEDURE — 83735 ASSAY OF MAGNESIUM: CPT

## 2025-03-23 PROCEDURE — 99232 SBSQ HOSP IP/OBS MODERATE 35: CPT | Performed by: STUDENT IN AN ORGANIZED HEALTH CARE EDUCATION/TRAINING PROGRAM

## 2025-03-23 PROCEDURE — 84100 ASSAY OF PHOSPHORUS: CPT

## 2025-03-23 PROCEDURE — 94660 CPAP INITIATION&MGMT: CPT

## 2025-03-23 PROCEDURE — 94760 N-INVAS EAR/PLS OXIMETRY 1: CPT

## 2025-03-23 PROCEDURE — 99232 SBSQ HOSP IP/OBS MODERATE 35: CPT | Performed by: INTERNAL MEDICINE

## 2025-03-23 RX ORDER — IPRATROPIUM BROMIDE AND ALBUTEROL SULFATE 2.5; .5 MG/3ML; MG/3ML
SOLUTION RESPIRATORY (INHALATION)
Status: COMPLETED
Start: 2025-03-23 | End: 2025-03-23

## 2025-03-23 RX ADMIN — ASPIRIN 81 MG CHEWABLE TABLET 81 MG: 81 TABLET CHEWABLE at 08:53

## 2025-03-23 RX ADMIN — PRAVASTATIN SODIUM 80 MG: 80 TABLET ORAL at 17:09

## 2025-03-23 RX ADMIN — FORMOTEROL FUMARATE DIHYDRATE 20 MCG: 20 SOLUTION RESPIRATORY (INHALATION) at 07:52

## 2025-03-23 RX ADMIN — IPRATROPIUM BROMIDE 0.5 MG: 0.5 SOLUTION RESPIRATORY (INHALATION) at 07:51

## 2025-03-23 RX ADMIN — DIBASIC SODIUM PHOSPHATE, MONOBASIC POTASSIUM PHOSPHATE AND MONOBASIC SODIUM PHOSPHATE 1 TABLET: 852; 155; 130 TABLET ORAL at 08:53

## 2025-03-23 RX ADMIN — FUROSEMIDE 80 MG: 10 INJECTION, SOLUTION INTRAMUSCULAR; INTRAVENOUS at 08:53

## 2025-03-23 RX ADMIN — DOCUSATE SODIUM 100 MG: 100 CAPSULE, LIQUID FILLED ORAL at 17:09

## 2025-03-23 RX ADMIN — DIBASIC SODIUM PHOSPHATE, MONOBASIC POTASSIUM PHOSPHATE AND MONOBASIC SODIUM PHOSPHATE 1 TABLET: 852; 155; 130 TABLET ORAL at 21:20

## 2025-03-23 RX ADMIN — HEPARIN SODIUM 5000 UNITS: 5000 INJECTION INTRAVENOUS; SUBCUTANEOUS at 13:56

## 2025-03-23 RX ADMIN — HEPARIN SODIUM 5000 UNITS: 5000 INJECTION INTRAVENOUS; SUBCUTANEOUS at 21:20

## 2025-03-23 RX ADMIN — POLYETHYLENE GLYCOL 3350 17 G: 17 POWDER, FOR SOLUTION ORAL at 08:53

## 2025-03-23 RX ADMIN — HEPARIN SODIUM 5000 UNITS: 5000 INJECTION INTRAVENOUS; SUBCUTANEOUS at 05:32

## 2025-03-23 RX ADMIN — DIBASIC SODIUM PHOSPHATE, MONOBASIC POTASSIUM PHOSPHATE AND MONOBASIC SODIUM PHOSPHATE 1 TABLET: 852; 155; 130 TABLET ORAL at 17:09

## 2025-03-23 RX ADMIN — BUDESONIDE 0.5 MG: 0.5 INHALANT RESPIRATORY (INHALATION) at 07:51

## 2025-03-23 RX ADMIN — BUDESONIDE 0.5 MG: 0.5 INHALANT RESPIRATORY (INHALATION) at 19:39

## 2025-03-23 RX ADMIN — IPRATROPIUM BROMIDE AND ALBUTEROL SULFATE: .5; 3 SOLUTION RESPIRATORY (INHALATION) at 12:51

## 2025-03-23 RX ADMIN — DOCUSATE SODIUM 100 MG: 100 CAPSULE, LIQUID FILLED ORAL at 08:53

## 2025-03-23 RX ADMIN — LEVALBUTEROL HYDROCHLORIDE 1.25 MG: 1.25 SOLUTION RESPIRATORY (INHALATION) at 16:40

## 2025-03-23 RX ADMIN — DIBASIC SODIUM PHOSPHATE, MONOBASIC POTASSIUM PHOSPHATE AND MONOBASIC SODIUM PHOSPHATE 1 TABLET: 852; 155; 130 TABLET ORAL at 13:56

## 2025-03-23 RX ADMIN — PREDNISONE 10 MG: 10 TABLET ORAL at 08:53

## 2025-03-23 RX ADMIN — LEVALBUTEROL HYDROCHLORIDE 1.25 MG: 1.25 SOLUTION RESPIRATORY (INHALATION) at 07:51

## 2025-03-23 RX ADMIN — IPRATROPIUM BROMIDE 0.5 MG: 0.5 SOLUTION RESPIRATORY (INHALATION) at 19:39

## 2025-03-23 RX ADMIN — LEVALBUTEROL HYDROCHLORIDE 1.25 MG: 1.25 SOLUTION RESPIRATORY (INHALATION) at 19:39

## 2025-03-23 RX ADMIN — IPRATROPIUM BROMIDE 0.5 MG: 0.5 SOLUTION RESPIRATORY (INHALATION) at 16:40

## 2025-03-23 RX ADMIN — FORMOTEROL FUMARATE DIHYDRATE 20 MCG: 20 SOLUTION RESPIRATORY (INHALATION) at 19:39

## 2025-03-23 NOTE — PROGRESS NOTES
Progress Note - Hospitalist   Name: Natalio Hartmann Jr. 69 y.o. male I MRN: [id number]  Unit/Bed#: [facility] 431-01 I Date of Admission: 3/14/2025   Date of Service: 3/23/2025 I Hospital Day: 9    Assessment & Plan  Acute on chronic respiratory failure with hypoxia and hypercapnia (HCC)  History of COPD and CHF, baseline O2 needs of 4 L via nasal cannula  Uses trilogy NIV at night and is on prednisone 10 mg daily chronically  Admitted to ICU for IV diuresis and respiratory support with BiPAP, steroids, nebulizers.  Now improved and tansferred to floor  Continue supplemental oxygen to keep at sats more than 88%  Encourage incentive spirometry as able  Pulmonary following  Acute on chronic systolic congestive heart failure (HCC)  Wt Readings from Last 3 Encounters:   03/23/25 41.8 kg (92 lb 2.4 oz)   02/21/25 44.3 kg (97 lb 11.2 oz)   09/06/24 44.9 kg (99 lb)   Known history of nonischemic cardiomyopathy with LVEF 25%  Presents with worsening shortness of breath volume overload state noted  Admitted to ICU and currently volume status is improving to the point where patient deemed appropriate for discharge to the floor  Mare on Lasix 80 mg IV daily  Follow I's/O, daily weight, and monitor  COPD exacerbation (HCC)  Monitored by pulmonology during hospitalization.  Low concern for acute exacerbation  Continue inhaler regimen and prednisone 10 mg daily  SIRS (systemic inflammatory response syndrome)  SIRS present on admission, patient initially covered for suspected pneumonia. Symptoms felt more likely to be related to CHF, and abx stopped after 4 days  Blood cultures negative  Monitor off antibiotics  Obstructive sleep apnea  BiPAP as needed and at bedtime, patient appears to be tolerating  Metabolic encephalopathy  Secondary to respiratory failure which is multifactorial related to COPD and CHF.  Probable hypoxia and hypercapnia component as well  Status appears to be improving with nocturnal BIPAP  Continue supportive measures  and monitor  Severe protein-calorie malnutrition (HCC)  Malnutrition Findings:     Body mass index is 16.85 kg/m².   Encourage adequate protein and calorie intake  Goals of care, counseling/discussion  Patient with multiple comorbidities overall guarded prognosis discussed with spouse at bedside  Continue Level 1 Full Code at this time  Hyperlipidemia  Resume Pravachol 80mg po qd  Malnutrition (HCC)  Malnutrition Findings:   Adult Malnutrition type: Chronic illness  Adult Degree of Malnutrition: Other severe protein calorie malnutrition  Malnutrition Characteristics: Fat loss, Muscle loss              360 Statement: severe malnutrition r/t chronic illness as evidenced by severe muscle loss around clavicles and shoulders, moderate-severe muscle loss in temples, severe buccal fat pad loss, apparent depression between ribs suggesting severe fat loss. Treatment: oral diet and oral nutrition supplements  BMI Findings:         Body mass index is 16.85 kg/m².   VTE Pharmacologic Prophylaxis: VTE Score: 8 Moderate Risk (Score 3-4) - Pharmacological DVT Prophylaxis Ordered: heparin.    Mobility:   Basic Mobility Inpatient Raw Score: 14  JH-HLM Goal: 4: Move to chair/commode  JH-HLM Achieved: 1: Laying in bed  JH-HLM Goal NOT achieved. Continue with multidisciplinary rounding and encourage appropriate mobility to improve upon JH-HLM goals.    Patient Centered Rounds: I performed bedside rounds with nursing staff today.   Discussions with Specialists or Other Care Team Provider: Cardiology    Education and Discussions with Family / Patient: Patient declined call to .     Current Length of Stay: 9 day(s)  Current Patient Status: Inpatient   Certification Statement: The patient will continue to require additional inpatient hospital stay due to CHF  Discharge Plan: Anticipate discharge in 48 hrs to discharge location to be determined pending rehab evaluations.    Code Status: Level 1 - Full Code    Subjective   Seen  and examined at bedside this morning.  Patient states that he feels well.  No new complaints at present    Objective :  Temp:  [97.6 °F (36.4 °C)-98.5 °F (36.9 °C)] 98 °F (36.7 °C)  HR:  [70-80] 70  BP: ()/(63-70) 106/67  Resp:  [16-57] 17  SpO2:  [95 %-100 %] 96 %  O2 Device: Nasal cannula  Nasal Cannula O2 Flow Rate (L/min):  [2 L/min-3 L/min] 3 L/min    Body mass index is 16.85 kg/m².     Input and Output Summary (last 24 hours):     Intake/Output Summary (Last 24 hours) at 3/23/2025 1652  Last data filed at 3/23/2025 1639  Gross per 24 hour   Intake 850 ml   Output 800 ml   Net 50 ml     Physical Exam  Vitals and nursing note reviewed.   Constitutional:       General: He is not in acute distress.     Appearance: He is well-developed. He is not diaphoretic.      Comments: frail   HENT:      Head: Normocephalic and atraumatic.      Mouth/Throat:      Mouth: Mucous membranes are moist.   Eyes:      General: No scleral icterus.     Conjunctiva/sclera: Conjunctivae normal.   Cardiovascular:      Rate and Rhythm: Normal rate and regular rhythm.      Heart sounds: No murmur heard.  Pulmonary:      Effort: Pulmonary effort is normal. No respiratory distress.      Breath sounds: Normal breath sounds. No rales.   Abdominal:      General: There is no distension.      Palpations: Abdomen is soft.      Tenderness: There is no abdominal tenderness. There is no guarding or rebound.   Musculoskeletal:         General: No swelling.      Cervical back: Neck supple.   Skin:     General: Skin is warm and dry.      Capillary Refill: Capillary refill takes less than 2 seconds.   Neurological:      General: No focal deficit present.      Mental Status: He is alert and oriented to person, place, and time.   Psychiatric:         Mood and Affect: Mood normal.       Lines/Drains:  Lines/Drains/Airways       Active Status       Name Placement date Placement time Site Days    External Urinary Catheter 03/21/25  1300  -- 2                       Lab Results: I have reviewed the following results:   Results from last 7 days   Lab Units 03/23/25  0552   WBC Thousand/uL 6.09   HEMOGLOBIN g/dL 11.5*   HEMATOCRIT % 36.8   PLATELETS Thousands/uL 161   SEGS PCT % 73   LYMPHO PCT % 14   MONO PCT % 11   EOS PCT % 2     Results from last 7 days   Lab Units 03/23/25  0552 03/22/25  0448 03/19/25  0529 03/18/25  0521   SODIUM mmol/L 135 138   < > 137   POTASSIUM mmol/L 3.8 4.0   < > 3.3*   CHLORIDE mmol/L 86* 89*   < > 89*   CO2 mmol/L >45* 44*   < > 45*   BUN mg/dL 33* 35*   < > 40*   CREATININE mg/dL 0.73 0.60   < > 0.68   ANION GAP mmol/L  --  5   < > 3*   CALCIUM mg/dL 9.0 8.9   < > 8.2*   ALBUMIN g/dL  --   --   --  3.4*   TOTAL BILIRUBIN mg/dL  --   --   --  0.42   ALK PHOS U/L  --   --   --  44   ALT U/L  --   --   --  42   AST U/L  --   --   --  28   GLUCOSE RANDOM mg/dL 99 68   < > 153*    < > = values in this interval not displayed.                       Recent Cultures (last 7 days):               Last 24 Hours Medication List:     Current Facility-Administered Medications:     acetaminophen (TYLENOL) tablet 650 mg, Q6H PRN    aluminum-magnesium hydroxide-simethicone (MAALOX) oral suspension 30 mL, Q6H PRN    aspirin chewable tablet 81 mg, Daily    budesonide (PULMICORT) inhalation solution 0.5 mg, Q12H    [Transfer Hold] chlorhexidine (PERIDEX) 0.12 % oral rinse 15 mL, Q12H GABE    docusate sodium (COLACE) capsule 100 mg, BID    formoterol (PERFOROMIST) nebulizer solution 20 mcg, Q12H    furosemide (LASIX) injection 80 mg, Daily    heparin (porcine) subcutaneous injection 5,000 Units, Q8H GABE    ipratropium (ATROVENT) 0.02 % inhalation solution 0.5 mg, TID    levalbuterol (XOPENEX) inhalation solution 1.25 mg, TID    ondansetron (ZOFRAN) injection 4 mg, Q6H PRN    polyethylene glycol (MIRALAX) packet 17 g, Daily    potassium-sodium phosphateS (K-PHOS,PHOSPHA 250) -250 mg tablet 1 tablet, 4x Daily (with meals and at bedtime)    pravastatin  (PRAVACHOL) tablet 80 mg, Daily With Dinner    predniSONE tablet 10 mg, Daily    Administrative Statements   Today, Patient Was Seen By: Daniel Amin      **Please Note: This note may have been constructed using a voice recognition system.**

## 2025-03-23 NOTE — ASSESSMENT & PLAN NOTE
Malnutrition Findings:     Body mass index is 16.85 kg/m².   Encourage adequate protein and calorie intake

## 2025-03-23 NOTE — ASSESSMENT & PLAN NOTE
Malnutrition Findings:   Adult Malnutrition type: Chronic illness  Adult Degree of Malnutrition: Other severe protein calorie malnutrition  Malnutrition Characteristics: Fat loss, Muscle loss              360 Statement: severe malnutrition r/t chronic illness as evidenced by severe muscle loss around clavicles and shoulders, moderate-severe muscle loss in temples, severe buccal fat pad loss, apparent depression between ribs suggesting severe fat loss. Treatment: oral diet and oral nutrition supplements  BMI Findings:         Body mass index is 16.85 kg/m².

## 2025-03-23 NOTE — ASSESSMENT & PLAN NOTE
Wt Readings from Last 3 Encounters:   03/23/25 41.8 kg (92 lb 2.4 oz)   02/21/25 44.3 kg (97 lb 11.2 oz)   09/06/24 44.9 kg (99 lb)   Known history of nonischemic cardiomyopathy with LVEF 25%  Presents with worsening shortness of breath volume overload state noted  Admitted to ICU and currently volume status is improving to the point where patient deemed appropriate for discharge to the floor  Mare on Lasix 80 mg IV daily  Follow I's/O, daily weight, and monitor

## 2025-03-23 NOTE — PROGRESS NOTES
Heart Failure Cardiology - Inpatient Consult Follow up Note    Natalio Hartmann Jr.  MRN: [id number]  Unit/Bed#: [facility] 431-01; Encounter: [id number]      Assessment / Plan:  69 y.o. male with a PMH of severe COPD, chronic hypoxic and hypercapnic respiratory failure on 4 L supplemental oxygen, cachexia, nonischemic cardiomyopathy, HFrEF --   who presents to the ER 3-14-25 with worsening shortness of breath,  increasingly lethargic feeling, and fatigue.  On day of admit, he was visibly short of breath slumped in the chair noted by his wife and brought to the ER.   In the ED he was noted to be hypoxic and hypercapnic requiring BiPAP.   BNP on admit>1000 (higher than priors).  CXR showed pulmonary congestion.  Unable to wean off Bipap--admitted to MICU.   Heart failure team consulted.   Transfered to floor 3-22-25.     # Acute on Chronic Respiratory Failure with hypoxia and hypercapnia  # COPD exacerbation  -Former smoker; 105 pack years, quit in 2012.   -Multiple hospitalizations with acute COPD exacerbations   -Required BiPAP in the ER--CO2 on admit 140--> now 83  -Multifactorial with COPD and CHF exacerbation contributing (COPD > CHF) +/- PNA  -treatment of COPD exacerbation per pulmonary / primary team  -completed treatment for possible PNA with ceftriaxone x 7 days  -treatment of CHF as below     # HFrEF - acute on chronic     ETIOLOGY:  - LVEF 25%; NYHA III; ACC/AHA Stage C/D   - Nonischemic cardiomyopathy.  Possible dyssynchrony from chronic LBBB.  Despite QRS only being 120 ms, echo shows significant dyssynchrony.     VOLUME:  - home diuretic:  torsemide 40 mg QD alternating with 20 mg QD   - inpatient diuretic:  On admission IVC dilated on echo and BNP higher than priors.  Has been getting IV lasix.   Would continue for today.   May be approaching euvolemia.     GDMT:    (Limited by low BP)  - not currently on GDMT    DEVICE:  - he does not have an ICD.   Severe lung disease with life expectancy  < 1 year.     # Metabolic Encephalopathy (HCC)  - on admit likely d/t Hypercapnia, hypoxia     # Nonobstructive CAD  Has coronary calcium on CT  - on aspirin  - on pravastatin     # Hyperlipidemia  - on pravastatin     # KEVIN   On BiPAP nightly     # Cachexia  -BMI 16     # GOC.    -palliative care discussed. He is not interested at this time.  Recommend continued discussions.        Today's Plan Summary:  On admission IVC dilated on echo and BNP higher than priors.  Has been getting IV lasix.   Would continue for today.   May be approaching euvolemia.     Management of COPD exacerbation and severe lung disease per primary team    Overall, short-term prognosis is concerning and long-term prognosis is guarded.  Recommend palliative care and discussions about home hospice.                       Interval Events:  Transferred to floor.  Currently off bipap.  Hemodynamically stable.     Subjective:  Reports SOB.  Denies chest pain.  Denies edema.  Denies syncope.    Objective:  Vitals:        reviewed all vitals past 24 hours.  See epic.  I+O's:         1770 in /  1050 out  Weights:    92 lbs  Telemetry:  sinus rhythm with some sinus bradycardia    Cardiac Imaging personally reviewed:  Echo 3-16-25  LVIDd 6.1cm.  EF 25%.  Grade 1 DD.  abnormal septal motion consistent with left bundle branch block.   RV is moderately dilated. Systolic function is low normal.   Mild TR.    RVSP 42.      Patient Active Problem List    Diagnosis Date Noted    Iron deficiency anemia secondary to inadequate dietary iron intake 05/17/2024    SIADH (syndrome of inappropriate ADH production) ([facility]) 05/17/2024    Type 2 diabetes mellitus without complication, with long-term current use of insulin ([facility]) 05/17/2024    End stage COPD ([facility]) 02/08/2024    Dependence on respirator (ventilator) status ([facility]) 01/10/2024    Severe protein-calorie malnutrition ([facility]) 10/14/2023    History of bladder cancer 07/31/2023    Pulmonary cachexia due to COPD ([facility])      Chronic hypercapnia/KEVIN 07/20/2023    Metabolic encephalopathy 07/20/2023    Palliative care patient 06/13/2023    Alkalosis 06/12/2023    Malnutrition ([facility]) 06/10/2023    Hyperlipidemia 05/02/2023    COPD exacerbation ([facility]) 04/28/2023    Steroid-induced hyperglycemia 03/30/2023    Physical deconditioning 02/21/2023    Chronic anemia 02/17/2023    SIRS (systemic inflammatory response syndrome) 02/17/2023    Hyponatremia 02/17/2023    Acute on chronic systolic congestive heart failure ([facility]) 09/12/2022    Pulmonary nodules/lesions, multiple 03/27/2022    Prostate cancer ([facility]) 05/24/2021    BPH with obstruction/lower urinary tract symptoms 04/13/2021    Goals of care, counseling/discussion 02/25/2021    Acute on chronic respiratory failure with hypoxia and hypercapnia ([facility]) 11/19/2020    Macrocytosis without anemia 05/23/2019    Other insomnia 02/18/2019    GERD (gastroesophageal reflux disease) 01/05/2017    Nonobstructive atherosclerosis of coronary artery 02/25/2016    Mood disorder ([facility]) 08/18/2015    Prediabetes 02/19/2015    Osteoporosis 10/14/2014    Vitamin D deficiency 10/14/2014    Nonischemic cardiomyopathy ([facility]) 09/26/2014    Left bundle-branch block 08/03/2013    Osteoarthritis 08/03/2013    Obstructive sleep apnea 07/29/2013       Past Medical History:   Diagnosis Date    Acute metabolic encephalopathy 03/29/2022    Arthritis     Bladder mass     Cardiomyopathy ([facility])     Chest pain     COPD (chronic obstructive pulmonary disease) ([facility])     COVID-19 virus infection 02/23/2023    CPAP (continuous positive airway pressure) dependence     Emphysema of lung ([facility])     Hypoxia     nocturnal    Left bundle branch block     Multiple pulmonary nodules     last assessed: 10/12/16    Pneumonia     Sleep apnea, obstructive     Smoker     Weight loss 08/29/2019       No Known Allergies      Current Facility-Administered Medications:     acetaminophen (TYLENOL) tablet 650 mg, 650 mg, Oral, Q6H PRN, Richelle Martines MD     aluminum-magnesium hydroxide-simethicone (MAALOX) oral suspension 30 mL, 30 mL, Oral, Q6H PRN, Richelle Martines MD    aspirin chewable tablet 81 mg, 81 mg, Oral, Daily, Richelle Martines MD, 81 mg at 03/23/25 0853    budesonide (PULMICORT) inhalation solution 0.5 mg, 0.5 mg, Nebulization, Q12H, Richelle Martines MD, 0.5 mg at 03/23/25 0751    [Transfer Hold] chlorhexidine (PERIDEX) 0.12 % oral rinse 15 mL, 15 mL, Mouth/Throat, Q12H GABE, Nguyễn Liu DO, 15 mL at 03/22/25 0932    docusate sodium (COLACE) capsule 100 mg, 100 mg, Oral, BID, Richelle Martines MD, 100 mg at 03/23/25 0853    formoterol (PERFOROMIST) nebulizer solution 20 mcg, 20 mcg, Nebulization, Q12H, Richelle Martines MD, 20 mcg at 03/23/25 0752    furosemide (LASIX) injection 80 mg, 80 mg, Intravenous, Daily, Richelle Martines MD, 80 mg at 03/23/25 0853    heparin (porcine) subcutaneous injection 5,000 Units, 5,000 Units, Subcutaneous, Q8H GABE, Richelle Martines MD, 5,000 Units at 03/23/25 0532    ipratropium (ATROVENT) 0.02 % inhalation solution 0.5 mg, 0.5 mg, Nebulization, TID, Richelle Martines MD, 0.5 mg at 03/23/25 0751    levalbuterol (XOPENEX) inhalation solution 1.25 mg, 1.25 mg, Nebulization, TID, Richelle Martines MD, 1.25 mg at 03/23/25 0751    ondansetron (ZOFRAN) injection 4 mg, 4 mg, Intravenous, Q6H PRN, Richelle Martines MD    polyethylene glycol (MIRALAX) packet 17 g, 17 g, Oral, Daily, Richelle Martines MD, 17 g at 03/23/25 0853    potassium-sodium phosphateS (K-PHOS,PHOSPHA 250) -250 mg tablet 1 tablet, 1 tablet, Oral, 4x Daily (with meals and at bedtime), Daniel Amin, 1 tablet at 03/23/25 0853    pravastatin (PRAVACHOL) tablet 80 mg, 80 mg, Oral, Daily With Dinner, Richelle Martines MD, 80 mg at 03/22/25 1709    predniSONE tablet 10 mg, 10 mg, Oral, Daily, Richelle Martines MD, 10 mg at 03/23/25 0853    Social History     Socioeconomic History    Marital status: /Civil Union     Spouse name: Not on file    Number of children: Not on file    Years  "of education: Not on file    Highest education level: Not on file   Occupational History    Not on file   Tobacco Use    Smoking status: Former     Current packs/day: 0.00     Average packs/day: 2.5 packs/day for 42.0 years (105.0 ttl pk-yrs)     Types: Cigarettes     Start date:      Quit date: 2012     Years since quittin.2    Smokeless tobacco: Former    Tobacco comments:     1 ppd for 37 years, 2010 down to 5 cigs a day, is around second hand smoke   Vaping Use    Vaping status: Never Used   Substance and Sexual Activity    Alcohol use: Not Currently     Comment: rarely    Drug use: No    Sexual activity: Not Currently     Partners: Female   Other Topics Concern    Not on file   Social History Narrative    Daily coffee consumption: 8 or more cups a day        Used to work in [employer].  On disability.     Social Drivers of Health     Financial Resource Strain: Not on file   Food Insecurity: No Food Insecurity (2024)    Nursing - Inadequate Food Risk Classification     Worried About Running Out of Food in the Last Year: Never true     Ran Out of Food in the Last Year: Never true     Ran Out of Food in the Last Year: Not on file   Transportation Needs: No Transportation Needs (2024)    PRAPARE - Transportation     Lack of Transportation (Medical): No     Lack of Transportation (Non-Medical): No   Physical Activity: Not on file   Stress: Not on file   Social Connections: Not on file   Intimate Partner Violence: Not on file   Housing Stability: Low Risk  (2024)    Housing Stability Vital Sign     Unable to Pay for Housing in the Last Year: No     Number of Times Moved in the Last Year: 1     Homeless in the Last Year: No       Family History   Problem Relation Age of Onset    Diabetes Mother        Physical Exam:  Blood pressure 105/69, pulse 76, temperature 97.6 °F (36.4 °C), resp. rate 16, height 5' 2\" (1.575 m), weight 41.8 kg (92 lb 2.4 oz), SpO2 99%.  Body mass index is 16.85 " kg/m².  Wt Readings from Last 3 Encounters:   03/23/25 41.8 kg (92 lb 2.4 oz)   02/21/25 44.3 kg (97 lb 11.2 oz)   09/06/24 44.9 kg (99 lb)     General:  Chronically ill-appearing.  Off BiPAP currently.  Neck: JVP borderline elevated  Cardiovascular: Regular rate and rhythm difficult to hear for murmur or gallops in setting of wheezing anteriorly and bipap machine.  Lung: Wheezing, prolonged expiratory phase  Abdomen: Soft nontender nondistended  Extremities: Thin, no edema, warm.    Labs & Results:      Results from last 7 days   Lab Units 03/23/25  0552 03/22/25  0448 03/21/25  0513   WBC Thousand/uL 6.09 7.96 5.42   HEMOGLOBIN g/dL 11.5* 12.1 11.9*   HEMATOCRIT % 36.8 39.9 38.6   PLATELETS Thousands/uL 161 168 158         Results from last 7 days   Lab Units 03/23/25  0552 03/22/25  0448 03/21/25  0513 03/19/25  0529 03/18/25  0521 03/17/25  0502   POTASSIUM mmol/L 3.8 4.0 3.8   < > 3.3* 3.7   CHLORIDE mmol/L 86* 89* 89*   < > 89* 88*   CO2 mmol/L >45* 44* 45*   < > 45* >45*   BUN mg/dL 33* 35* 34*   < > 40* 44*   CREATININE mg/dL 0.73 0.60 0.56*   < > 0.68 0.68   CALCIUM mg/dL 9.0 8.9 9.1   < > 8.2* 8.5   ALK PHOS U/L  --   --   --   --  44 50   ALT U/L  --   --   --   --  42 59*   AST U/L  --   --   --   --  28 46*    < > = values in this interval not displayed.                 ________________________________________    Nguyễn Kevin MD, [facility]  Advanced Heart Failure and Transplant Cardiologist  Director of Cardio-Oncology  [facility]

## 2025-03-23 NOTE — ASSESSMENT & PLAN NOTE
History of COPD and CHF, baseline O2 needs of 4 L via nasal cannula  Uses trilogy NIV at night and is on prednisone 10 mg daily chronically  Admitted to ICU for IV diuresis and respiratory support with BiPAP, steroids, nebulizers.  Now improved and tansferred to floor  Continue supplemental oxygen to keep at sats more than 88%  Encourage incentive spirometry as able  Pulmonary following

## 2025-03-23 NOTE — ASSESSMENT & PLAN NOTE
SIRS present on admission, patient initially covered for suspected pneumonia. Symptoms felt more likely to be related to CHF, and abx stopped after 4 days  Blood cultures negative  Monitor off antibiotics

## 2025-03-23 NOTE — ASSESSMENT & PLAN NOTE
Monitored by pulmonology during hospitalization.  Low concern for acute exacerbation  Continue inhaler regimen and prednisone 10 mg daily

## 2025-03-23 NOTE — ASSESSMENT & PLAN NOTE
Patient with multiple comorbidities overall guarded prognosis discussed with spouse at bedside  Continue Level 1 Full Code at this time

## 2025-03-23 NOTE — ASSESSMENT & PLAN NOTE
Secondary to respiratory failure which is multifactorial related to COPD and CHF.  Probable hypoxia and hypercapnia component as well  Status appears to be improving with nocturnal BIPAP  Continue supportive measures and monitor

## 2025-03-24 ENCOUNTER — PATIENT OUTREACH (OUTPATIENT)
Dept: CARDIOLOGY CLINIC | Facility: CLINIC | Age: 70
End: 2025-03-24

## 2025-03-24 LAB
ALBUMIN SERPL BCG-MCNC: 3.6 G/DL (ref 3.5–5)
ALP SERPL-CCNC: 40 U/L (ref 34–104)
ALT SERPL W P-5'-P-CCNC: 22 U/L (ref 7–52)
ANION GAP SERPL CALCULATED.3IONS-SCNC: 6 MMOL/L (ref 4–13)
AST SERPL W P-5'-P-CCNC: 23 U/L (ref 13–39)
BILIRUB SERPL-MCNC: 0.68 MG/DL (ref 0.2–1)
BUN SERPL-MCNC: 30 MG/DL (ref 5–25)
CALCIUM SERPL-MCNC: 8.7 MG/DL (ref 8.4–10.2)
CHLORIDE SERPL-SCNC: 87 MMOL/L (ref 96–108)
CO2 SERPL-SCNC: 44 MMOL/L (ref 21–32)
CREAT SERPL-MCNC: 0.7 MG/DL (ref 0.6–1.3)
ERYTHROCYTE [DISTWIDTH] IN BLOOD BY AUTOMATED COUNT: 13.1 % (ref 11.6–15.1)
GFR SERPL CREATININE-BSD FRML MDRD: 97 ML/MIN/1.73SQ M
GLUCOSE SERPL-MCNC: 116 MG/DL (ref 65–140)
HCT VFR BLD AUTO: 36.9 % (ref 36.5–49.3)
HGB BLD-MCNC: 11 G/DL (ref 12–17)
MAGNESIUM SERPL-MCNC: 2.3 MG/DL (ref 1.9–2.7)
MCH RBC QN AUTO: 29.8 PG (ref 26.8–34.3)
MCHC RBC AUTO-ENTMCNC: 29.8 G/DL (ref 31.4–37.4)
MCV RBC AUTO: 100 FL (ref 82–98)
PHOSPHATE SERPL-MCNC: 3.4 MG/DL (ref 2.3–4.1)
PLATELET # BLD AUTO: 164 THOUSANDS/UL (ref 149–390)
PMV BLD AUTO: 10.7 FL (ref 8.9–12.7)
POTASSIUM SERPL-SCNC: 3.1 MMOL/L (ref 3.5–5.3)
PROT SERPL-MCNC: 5.9 G/DL (ref 6.4–8.4)
RBC # BLD AUTO: 3.69 MILLION/UL (ref 3.88–5.62)
SODIUM SERPL-SCNC: 137 MMOL/L (ref 135–147)
WBC # BLD AUTO: 5.49 THOUSAND/UL (ref 4.31–10.16)

## 2025-03-24 PROCEDURE — 80053 COMPREHEN METABOLIC PANEL: CPT | Performed by: STUDENT IN AN ORGANIZED HEALTH CARE EDUCATION/TRAINING PROGRAM

## 2025-03-24 PROCEDURE — 83735 ASSAY OF MAGNESIUM: CPT | Performed by: STUDENT IN AN ORGANIZED HEALTH CARE EDUCATION/TRAINING PROGRAM

## 2025-03-24 PROCEDURE — 94003 VENT MGMT INPAT SUBQ DAY: CPT

## 2025-03-24 PROCEDURE — 94640 AIRWAY INHALATION TREATMENT: CPT

## 2025-03-24 PROCEDURE — 85027 COMPLETE CBC AUTOMATED: CPT | Performed by: STUDENT IN AN ORGANIZED HEALTH CARE EDUCATION/TRAINING PROGRAM

## 2025-03-24 PROCEDURE — 99232 SBSQ HOSP IP/OBS MODERATE 35: CPT | Performed by: INTERNAL MEDICINE

## 2025-03-24 PROCEDURE — 84100 ASSAY OF PHOSPHORUS: CPT | Performed by: STUDENT IN AN ORGANIZED HEALTH CARE EDUCATION/TRAINING PROGRAM

## 2025-03-24 PROCEDURE — 94760 N-INVAS EAR/PLS OXIMETRY 1: CPT

## 2025-03-24 PROCEDURE — 99232 SBSQ HOSP IP/OBS MODERATE 35: CPT | Performed by: STUDENT IN AN ORGANIZED HEALTH CARE EDUCATION/TRAINING PROGRAM

## 2025-03-24 RX ORDER — POTASSIUM CHLORIDE 1500 MG/1
40 TABLET, EXTENDED RELEASE ORAL EVERY 6 HOURS
Status: COMPLETED | OUTPATIENT
Start: 2025-03-24 | End: 2025-03-24

## 2025-03-24 RX ORDER — TORSEMIDE 20 MG/1
40 TABLET ORAL DAILY
Status: DISCONTINUED | OUTPATIENT
Start: 2025-03-25 | End: 2025-03-24

## 2025-03-24 RX ORDER — TORSEMIDE 20 MG/1
40 TABLET ORAL DAILY
Status: DISCONTINUED | OUTPATIENT
Start: 2025-03-24 | End: 2025-03-24

## 2025-03-24 RX ADMIN — IPRATROPIUM BROMIDE 0.5 MG: 0.5 SOLUTION RESPIRATORY (INHALATION) at 07:13

## 2025-03-24 RX ADMIN — FUROSEMIDE 80 MG: 10 INJECTION, SOLUTION INTRAMUSCULAR; INTRAVENOUS at 08:54

## 2025-03-24 RX ADMIN — HEPARIN SODIUM 5000 UNITS: 5000 INJECTION INTRAVENOUS; SUBCUTANEOUS at 05:28

## 2025-03-24 RX ADMIN — POLYETHYLENE GLYCOL 3350 17 G: 17 POWDER, FOR SOLUTION ORAL at 08:55

## 2025-03-24 RX ADMIN — BUDESONIDE 0.5 MG: 0.5 INHALANT RESPIRATORY (INHALATION) at 07:13

## 2025-03-24 RX ADMIN — PREDNISONE 10 MG: 10 TABLET ORAL at 08:55

## 2025-03-24 RX ADMIN — POTASSIUM CHLORIDE 40 MEQ: 1500 TABLET, EXTENDED RELEASE ORAL at 14:57

## 2025-03-24 RX ADMIN — POTASSIUM CHLORIDE 40 MEQ: 1500 TABLET, EXTENDED RELEASE ORAL at 08:55

## 2025-03-24 RX ADMIN — LEVALBUTEROL HYDROCHLORIDE 1.25 MG: 1.25 SOLUTION RESPIRATORY (INHALATION) at 07:13

## 2025-03-24 RX ADMIN — DOCUSATE SODIUM 100 MG: 100 CAPSULE, LIQUID FILLED ORAL at 08:55

## 2025-03-24 RX ADMIN — HEPARIN SODIUM 5000 UNITS: 5000 INJECTION INTRAVENOUS; SUBCUTANEOUS at 14:58

## 2025-03-24 RX ADMIN — IPRATROPIUM BROMIDE 0.5 MG: 0.5 SOLUTION RESPIRATORY (INHALATION) at 19:02

## 2025-03-24 RX ADMIN — HEPARIN SODIUM 5000 UNITS: 5000 INJECTION INTRAVENOUS; SUBCUTANEOUS at 22:56

## 2025-03-24 RX ADMIN — PRAVASTATIN SODIUM 80 MG: 80 TABLET ORAL at 17:31

## 2025-03-24 RX ADMIN — BUDESONIDE 0.5 MG: 0.5 INHALANT RESPIRATORY (INHALATION) at 19:02

## 2025-03-24 RX ADMIN — DOCUSATE SODIUM 100 MG: 100 CAPSULE, LIQUID FILLED ORAL at 17:31

## 2025-03-24 RX ADMIN — FORMOTEROL FUMARATE DIHYDRATE 20 MCG: 20 SOLUTION RESPIRATORY (INHALATION) at 19:02

## 2025-03-24 RX ADMIN — LEVALBUTEROL HYDROCHLORIDE 1.25 MG: 1.25 SOLUTION RESPIRATORY (INHALATION) at 19:02

## 2025-03-24 RX ADMIN — IPRATROPIUM BROMIDE 0.5 MG: 0.5 SOLUTION RESPIRATORY (INHALATION) at 13:58

## 2025-03-24 RX ADMIN — LEVALBUTEROL HYDROCHLORIDE 1.25 MG: 1.25 SOLUTION RESPIRATORY (INHALATION) at 13:58

## 2025-03-24 RX ADMIN — FORMOTEROL FUMARATE DIHYDRATE 20 MCG: 20 SOLUTION RESPIRATORY (INHALATION) at 07:13

## 2025-03-24 RX ADMIN — ASPIRIN 81 MG CHEWABLE TABLET 81 MG: 81 TABLET CHEWABLE at 08:55

## 2025-03-24 NOTE — ASSESSMENT & PLAN NOTE
Wt Readings from Last 3 Encounters:   03/24/25 42.5 kg (93 lb 12.8 oz)   02/21/25 44.3 kg (97 lb 11.2 oz)   09/06/24 44.9 kg (99 lb)   Known history of nonischemic cardiomyopathy with LVEF 25%  Presents with worsening shortness of breath volume overload state noted  Admitted to ICU and currently volume status is improving to the point where patient deemed appropriate for discharge to the floor  Received Lasix 80 mg IV today, likely switch to PO diuretics tomorrow  Follow I's/O, daily weight, and monitor

## 2025-03-24 NOTE — PROGRESS NOTES
Patient listed on Advanced Heart Failure Census at [facility]. AMB Social Work Order has been entered for the purpose of chart review and rounds with the team.     Outpatient Advanced Heart Failure LCSW completed electronic chart review. HF Team met with patient who was resting in bed. Dr. Bautista examined patient and discussed Today's Plan.     LCSW will close referral once pt is discharged from the hospital setting if no outpatient social work needs are identified by that time.

## 2025-03-24 NOTE — ASSESSMENT & PLAN NOTE
Malnutrition Findings:   Adult Malnutrition type: Chronic illness  Adult Degree of Malnutrition: Other severe protein calorie malnutrition  Malnutrition Characteristics: Fat loss, Muscle loss              360 Statement: severe malnutrition r/t chronic illness as evidenced by severe muscle loss around clavicles and shoulders, moderate-severe muscle loss in temples, severe buccal fat pad loss, apparent depression between ribs suggesting severe fat loss. Treatment: oral diet and oral nutrition supplements  BMI Findings:         Body mass index is 17.16 kg/m².

## 2025-03-24 NOTE — ASSESSMENT & PLAN NOTE
Continue nebulized bronchodilators budesonide, formoterol  Pulmonary toileting  Continue with prednisone 10 mg daily (baseline outpt steroid dosing)

## 2025-03-24 NOTE — ASSESSMENT & PLAN NOTE
Wt Readings from Last 3 Encounters:   03/24/25 42.5 kg (93 lb 12.8 oz)   02/21/25 44.3 kg (97 lb 11.2 oz)   09/06/24 44.9 kg (99 lb)   Acute decompensated CHF    BNP elevated 1079  2D echo 9/2024 EF 25% systolic function severely reduced,  severe global hypokinesis, grade 1 diastolic dysfunction LA moderately dilated mild TR, no aortic stenosis, trace MR, mild TR mildly elevated RVSP at 42, no pericardial effusion  Continue with Lasix 80 mg daily  Heart failure following appreciate recommendations

## 2025-03-24 NOTE — PROGRESS NOTES
Advanced Heart Failure / Pulmonary Hypertension Service Progress Note    Natalio Hartmann Jr. 69 y.o. male   MRN: [id number]  Unit/Bed#: [facility] 431-01; Encounter: [id number]    Assessment:  Principal Problem:    Acute on chronic respiratory failure with hypoxia and hypercapnia (HCC)  Active Problems:    Obstructive sleep apnea    Goals of care, counseling/discussion    Acute on chronic systolic congestive heart failure (HCC)    SIRS (systemic inflammatory response syndrome)    COPD exacerbation (HCC)    Hyperlipidemia    Malnutrition (HCC)    Metabolic encephalopathy    Severe protein-calorie malnutrition (HCC)    69 y.o. male with a PMH of severe COPD, chronic hypoxic and hypercapnic respiratory failure on 4 L supplemental oxygen, cachexia, nonischemic cardiomyopathy, HFrEF --   who presents to the ER 3-14-25 with worsening shortness of breath,  increasingly lethargic feeling, and fatigue.  On day of admit, he was visibly short of breath slumped in the chair noted by his wife and brought to the ER.   In the ED he was noted to be hypoxic and hypercapnic requiring BiPAP.   BNP on admit>1000 (higher than priors).  CXR showed pulmonary congestion.  Unable to wean off Bipap--admitted to MICU.   Heart failure team consulted.   Transfered to floor 3-22-25.     Subjective:   Patient seen and examined. No significant events overnight. Pt resting comfortably in bed.  Confused why he is still wheezing.      Objective:   Intake/ Output: 10/ 750 (-740)  Weight: admit 102 lbs--93--93 (same, 9 lbs total)  Telemetry: SR HR 60, SB at times HR 48-50's    Today's Plan:  Euvolemic on exam.  Will transition IV lasix  to Torsemide 40 mg PO tomorrow, pending assessment, BMP-- Creat, bicarb up.    No current GDMT d/t hypotn  S/p IV steroids--now on prednisone, s/p IV abx for PNA  Declines palliative care/hospice  Repeat BMP in a.m.    Plan:  Acute on Chronic Respiratory Failure with hypoxia and hypercapnia  # COPD exacerbation  -Former  "smoker; 105 pack years, quit in 2012.   -Multiple hospitalizations with acute COPD exacerbations   -Required BiPAP in the ER--CO2 on admit 140--> now 44  -Multifactorial with COPD and CHF exacerbation contributing (COPD > CHF) +/- PNA  -treatment of COPD exacerbation per pulmonary / primary team  -completed treatment for possible PNA with ceftriaxone x 7 days  -treatment of CHF as below     HFrEF - acute on chronic, LVEF 25%  ETIOLOGY: NICMP per [facility] in 2019.  Does have +coronary calcium on CT scan.  Possible dyssynchrony from chronic LBBB.  Despite QRS only being 120 ms, echo shows significant dyssynchrony.     VOLUME:  - home diuretic:  torsemide 40 mg QD alternating with 20 mg QD   - inpatient diuretic:  On admission IVC dilated on echo and BNP higher than priors.  Has been getting IV lasix.   Would continue for today.   May be approaching euvolemia.     GDMT:    (Limited by low BP)  - not currently on GDMT    DEVICE:  - he does not have an ICD.   Severe lung disease with life expectancy < 1 year.     Metabolic Encephalopathy (HCC)  - on admit likely d/t Hypercapnia, hypoxia     Nonobstructive CAD  Has coronary calcium on CT  [facility] done 2019  - on aspirin  - on pravastatin     Hyperlipidemia  - on pravastatin     KEVIN   On BiPAP nightly     Cachexia  -BMI 16     GOC.    -palliative care discussed. He is not interested at this time.  Recommend continued discussions.      Vitals:   Blood pressure 103/64, pulse 70, temperature 98.5 °F (36.9 °C), temperature source Axillary, resp. rate 18, height 5' 2\" (1.575 m), weight 42.5 kg (93 lb 12.8 oz), SpO2 99%.    Body mass index is 17.16 kg/m².    I/O last 3 completed shifts:  In: 490 [P.O.:480; I.V.:10]  Out: 1000 [Urine:1000]    Wt Readings from Last 10 Encounters:   03/24/25 42.5 kg (93 lb 12.8 oz)   02/21/25 44.3 kg (97 lb 11.2 oz)   09/06/24 44.9 kg (99 lb)   08/13/24 45.2 kg (99 lb 9.6 oz)   07/29/24 44.7 kg (98 lb 9.6 oz)   04/15/24 44.4 kg (97 lb 12.8 oz)   03/11/24 45 " kg (99 lb 4.8 oz)   02/04/24 45.4 kg (100 lb)   01/22/24 46.7 kg (103 lb)   01/10/24 43.5 kg (96 lb)     Vitals:    03/24/25 0500 03/24/25 0532 03/24/25 0715 03/24/25 0722   BP:    103/64   BP Location:    Right arm   Pulse:    70   Resp:    18   Temp:    98.5 °F (36.9 °C)   TempSrc:    Axillary   SpO2:   97% 99%   Weight: 42.5 kg (93 lb 12.8 oz) 42.5 kg (93 lb 12.8 oz)     Height:           Physical Exam  Constitutional:       Appearance: He is cachectic. He is ill-appearing.   Neck:      Vascular: No JVD.   Cardiovascular:      Rate and Rhythm: Normal rate and regular rhythm.      Heart sounds: Normal heart sounds, S1 normal and S2 normal.   Pulmonary:      Effort: Respiratory distress present.      Breath sounds: Normal air entry. Examination of the right-upper field reveals wheezing. Examination of the left-upper field reveals wheezing. Examination of the right-lower field reveals wheezing. Examination of the left-lower field reveals wheezing. Wheezing present.      Comments: Exp wheezes, scant  Musculoskeletal:      Right lower leg: No edema.      Left lower leg: No edema.   Skin:     General: Skin is warm and dry.   Neurological:      Mental Status: He is alert and oriented to person, place, and time. Mental status is at baseline.   Psychiatric:         Behavior: Behavior is cooperative.         Cognition and Memory: Cognition normal.           Current Facility-Administered Medications:     acetaminophen (TYLENOL) tablet 650 mg, 650 mg, Oral, Q6H PRN, Richelle Martines MD    aluminum-magnesium hydroxide-simethicone (MAALOX) oral suspension 30 mL, 30 mL, Oral, Q6H PRN, Richelle Martines MD    aspirin chewable tablet 81 mg, 81 mg, Oral, Daily, Richelle Martines MD, 81 mg at 03/24/25 0855    budesonide (PULMICORT) inhalation solution 0.5 mg, 0.5 mg, Nebulization, Q12H, Richelle Martines MD, 0.5 mg at 03/24/25 0713    [Transfer Hold] chlorhexidine (PERIDEX) 0.12 % oral rinse 15 mL, 15 mL, Mouth/Throat, Q12H GABE, Nguyễn  DO Noe, 15 mL at 03/22/25 0932    docusate sodium (COLACE) capsule 100 mg, 100 mg, Oral, BID, Richelle Martines MD, 100 mg at 03/24/25 0855    formoterol (PERFOROMIST) nebulizer solution 20 mcg, 20 mcg, Nebulization, Q12H, Richelle Martines MD, 20 mcg at 03/24/25 0713    furosemide (LASIX) injection 80 mg, 80 mg, Intravenous, Daily, Richelle Martines MD, 80 mg at 03/24/25 0854    heparin (porcine) subcutaneous injection 5,000 Units, 5,000 Units, Subcutaneous, Q8H GABE, Richelle Martines MD, 5,000 Units at 03/24/25 0528    ipratropium (ATROVENT) 0.02 % inhalation solution 0.5 mg, 0.5 mg, Nebulization, TID, Richelle Martines MD, 0.5 mg at 03/24/25 0713    levalbuterol (XOPENEX) inhalation solution 1.25 mg, 1.25 mg, Nebulization, TID, Richelle Martines MD, 1.25 mg at 03/24/25 0713    ondansetron (ZOFRAN) injection 4 mg, 4 mg, Intravenous, Q6H PRN, Richelle Martines MD    polyethylene glycol (MIRALAX) packet 17 g, 17 g, Oral, Daily, Richelle Martines MD, 17 g at 03/24/25 0855    potassium chloride (Klor-Con M20) CR tablet 40 mEq, 40 mEq, Oral, Q6H, Daniel Amin, 40 mEq at 03/24/25 0855    pravastatin (PRAVACHOL) tablet 80 mg, 80 mg, Oral, Daily With Dinner, Richelle Martines MD, 80 mg at 03/23/25 1709    predniSONE tablet 10 mg, 10 mg, Oral, Daily, Richelle Martines MD, 10 mg at 03/24/25 0855    Labs & Results:      Results from last 7 days   Lab Units 03/24/25  0528 03/23/25  0552 03/22/25  0448   WBC Thousand/uL 5.49 6.09 7.96   HEMOGLOBIN g/dL 11.0* 11.5* 12.1   HEMATOCRIT % 36.9 36.8 39.9   PLATELETS Thousands/uL 164 161 168         Results from last 7 days   Lab Units 03/24/25  0523 03/23/25  0552 03/22/25  0448 03/19/25  0529 03/18/25  0521   POTASSIUM mmol/L 3.1* 3.8 4.0   < > 3.3*   CHLORIDE mmol/L 87* 86* 89*   < > 89*   CO2 mmol/L 44* >45* 44*   < > 45*   BUN mg/dL 30* 33* 35*   < > 40*   CREATININE mg/dL 0.70 0.73 0.60   < > 0.68   CALCIUM mg/dL 8.7 9.0 8.9   < > 8.2*   ALK PHOS U/L 40  --   --   --  44   ALT U/L 22  --   --   --   42   AST U/L 23  --   --   --  28    < > = values in this interval not displayed.             Thank you for the opportunity to participate in the care of this patient.    DELIA Betancourt  Advanced Heart Failure  [facility]

## 2025-03-24 NOTE — ASSESSMENT & PLAN NOTE
Patient with multiple comorbidities and overall guarded prognosis.  Discussed with spouse at bedside  Continue Level 1 Full Code at this time

## 2025-03-24 NOTE — PROGRESS NOTES
Progress Note - Pulmonology   Name: Natalio Hartmann Jr. 69 y.o. male I MRN: [id number]  Unit/Bed#: [facility] 431-01 I Date of Admission: 3/14/2025   Date of Service: 3/24/2025 I Hospital Day: 10    Assessment & Plan  Acute on chronic respiratory failure with hypoxia and hypercapnia (HCC)  69-year-old male with medical history of very severe COPD FEV1 22%, chronic hypoxic and hypercapnic respiratory failure on 4 L supplemental O2 + Trilogy NIV at night, chronic prednisone 10 mg, COPD cachexia nonischemic cardiomyopathy EF 25%, KEVIN on BiPAP who presents with worsening shortness of breath admited for Acute on Chronic Hypoxic Hypercapnic respiratory failure 2/2 COPD/CHF exacerbation S/P MICU admission for IV diuresis, intermittent steroid dosing. Transferred to medical floor.     On 3L via NC, BIPAP 15/6 at night and as needed.  COPD exacerbation (HCC)  Continue nebulized bronchodilators budesonide, formoterol  Pulmonary toileting  Continue with prednisone 10 mg daily (baseline outpt steroid dosing)  Obstructive sleep apnea  On Trilogy NIV at night  C/W BIPAP as above  Acute on chronic systolic congestive heart failure (HCC)  Wt Readings from Last 3 Encounters:   03/24/25 42.5 kg (93 lb 12.8 oz)   02/21/25 44.3 kg (97 lb 11.2 oz)   09/06/24 44.9 kg (99 lb)   Acute decompensated CHF    BNP elevated 1079  2D echo 9/2024 EF 25% systolic function severely reduced,  severe global hypokinesis, grade 1 diastolic dysfunction LA moderately dilated mild TR, no aortic stenosis, trace MR, mild TR mildly elevated RVSP at 42, no pericardial effusion  Continue with Lasix 80 mg daily  Heart failure following appreciate recommendations  Hyperlipidemia    Malnutrition (HCC)  Malnutrition Findings:   Adult Malnutrition type: Chronic illness  Adult Degree of Malnutrition: Other severe protein calorie malnutrition  Malnutrition Characteristics: Fat loss, Muscle loss                  360 Statement: severe malnutrition r/t chronic illness as evidenced  by severe muscle loss around clavicles and shoulders, moderate-severe muscle loss in temples, severe buccal fat pad loss, apparent depression between ribs suggesting severe fat loss. Treatment: oral diet and oral nutrition supplements    BMI Findings:           Body mass index is 17.16 kg/m².     Metabolic encephalopathy  Improved likely in setting of acute on chronic hypercapnic respiratory failure  Patient Aox3  Severe protein-calorie malnutrition (HCC)  Malnutrition Findings:   Adult Malnutrition type: Chronic illness  Adult Degree of Malnutrition: Other severe protein calorie malnutrition  Malnutrition Characteristics: Fat loss, Muscle loss                  360 Statement: severe malnutrition r/t chronic illness as evidenced by severe muscle loss around clavicles and shoulders, moderate-severe muscle loss in temples, severe buccal fat pad loss, apparent depression between ribs suggesting severe fat loss. Treatment: oral diet and oral nutrition supplements    BMI Findings:         Recommend Nutrition Consult  Body mass index is 17.16 kg/m².   Goals of care, counseling/discussion  Palliative consulted during MICU course.   SIRS (systemic inflammatory response syndrome)  S/P Ceftriaxone, Azithromycin    Recommendations  - Monitor oxygen & supplement as needed for goal above 88% -currently on baseline O2 3 L via nasal cannula.   - Continue with Lasix 80 mg daily  - Heart failure following appreciate recommendations  - Continue nebulized bronchodilators budesonide, formoterol  - C/W BIPAP at night  - Pulmonary toileting  - Continue with prednisone 10 mg daily (baseline steroid dosing)  - PT OT, mobility as tolerated    24 Hour Events : Patient seen and examined at bedside.    Subjective : Patient seen and examined at bedside.  Reports that breathing is good today.  Denies chest tightness, chest pain, palpitations, tolerating BIPAP at night with new face mask.     Objective :  Temp:  [97.6 °F (36.4 °C)-98.5 °F (36.9  °C)] 98.5 °F (36.9 °C)  HR:  [66-77] 70  BP: (102-108)/(64-69) 103/64  Resp:  [17-18] 18  SpO2:  [96 %-100 %] 99 %  O2 Device: BiPAP  Nasal Cannula O2 Flow Rate (L/min):  [3 L/min] 3 L/min    Physical Exam  Constitutional:       General: He is not in acute distress.     Appearance: He is ill-appearing.   HENT:      Head: Normocephalic and atraumatic.   Cardiovascular:      Rate and Rhythm: Normal rate and regular rhythm.      Heart sounds: No murmur heard.  Pulmonary:      Effort: Pulmonary effort is normal.      Breath sounds: Wheezing present.   Neurological:      Mental Status: He is alert.         Lab Results: I have reviewed the following results:   .     03/24/25  0523 03/24/25  0528   WBC  --  5.49   HGB  --  11.0*   HCT  --  36.9   PLT  --  164   SODIUM 137  --    K 3.1*  --    CL 87*  --    CO2 44*  --    BUN 30*  --    CREATININE 0.70  --    GLUC 116  --    MG 2.3  --    PHOS 3.4  --    AST 23  --    ALT 22  --    ALB 3.6  --    TBILI 0.68  --    ALKPHOS 40  --      ABG: No new results in last 24 hours.    Imaging Results Review: I personally reviewed the following image studies/reports in PACS and discussed pertinent findings with Radiology: CT chest. My interpretation of the radiology images/reports is:  .  Chest x-ray significant for bilateral infiltrates concerning for pulmonary vascular congestion  Other Study Results Review: No additional pertinent studies reviewed.  PFT Results Reviewed: reviewed

## 2025-03-24 NOTE — CASE MANAGEMENT
Case Management Discharge Planning Note    Patient name Natalio Hartmann Jr.  [facility] MRN [id number]  : 1955 Date 3/24/2025       Current Admission Date: 3/14/2025  Current Admission Diagnosis:Acute on chronic respiratory failure with hypoxia and hypercapnia ([facility])   Patient Active Problem List    Diagnosis Date Noted Date Diagnosed    Iron deficiency anemia secondary to inadequate dietary iron intake 2024     SIADH (syndrome of inappropriate ADH production) ([facility]) 2024     Type 2 diabetes mellitus without complication, with long-term current use of insulin ([facility]) 2024     End stage COPD ([facility]) 2024     Dependence on respirator (ventilator) status ([facility]) 01/10/2024     Severe protein-calorie malnutrition ([facility]) 10/14/2023     History of bladder cancer 2023     Pulmonary cachexia due to COPD ([facility])      Chronic hypercapnia/KEVIN 2023     Metabolic encephalopathy 2023     Palliative care patient 2023     Alkalosis 2023     Malnutrition ([facility]) 06/10/2023     Hyperlipidemia 2023     COPD exacerbation ([facility]) 2023     Steroid-induced hyperglycemia 2023     Physical deconditioning 2023     Chronic anemia 2023     SIRS (systemic inflammatory response syndrome) 2023     Hyponatremia 2023     Acute on chronic systolic congestive heart failure ([facility]) 2022     Pulmonary nodules/lesions, multiple 2022     Prostate cancer ([facility]) 2021     BPH with obstruction/lower urinary tract symptoms 2021     Goals of care, counseling/discussion 2021     Acute on chronic respiratory failure with hypoxia and hypercapnia ([facility]) 2020     Macrocytosis without anemia 2019     Other insomnia 2019     GERD (gastroesophageal reflux disease) 2017     Nonobstructive atherosclerosis of coronary artery 2016     Mood disorder ([facility]) 2015     Prediabetes 2015     Osteoporosis  10/14/2014     Vitamin D deficiency 10/14/2014     Nonischemic cardiomyopathy (HCC) 09/26/2014     Left bundle-branch block 08/03/2013     Osteoarthritis 08/03/2013     Obstructive sleep apnea 07/29/2013       LOS (days): 10  Geometric Mean LOS (GMLOS) (days): 4.9  Days to GMLOS:-5     OBJECTIVE:  Risk of Unplanned Readmission Score: 22.69         Current admission status: Inpatient   Preferred Pharmacy:   [id number] - [place], [place] - [address]  [place] [address]  Phone: [phone] Fax: [phone]    Primary Care Provider: Fatmata Gustafson DO    Primary Insurance: BLUE CROSS  Secondary Insurance: MEDICARE    DISCHARGE DETAILS:    Discharge planning discussed with:: Patient and patient's spouse, Mellisa  Freedom of Choice: Yes  Comments - Freedom of Choice: CM met with patient at bedside to discuss PT/OT recommendations. Patient requested that CM call his wife, Mellisa, to discuss recommendations. CM called Mellisa and explained recommendations to her. She informed CM that would is coming to visit patient later today and would discuss recommendations with him and follow up with CM.

## 2025-03-24 NOTE — ASSESSMENT & PLAN NOTE
Malnutrition Findings:     Body mass index is 17.16 kg/m².   Encourage adequate protein and calorie intake

## 2025-03-24 NOTE — ASSESSMENT & PLAN NOTE
69-year-old male with medical history of very severe COPD FEV1 22%, chronic hypoxic and hypercapnic respiratory failure on 4 L supplemental O2 + Trilogy NIV at night, chronic prednisone 10 mg, COPD cachexia nonischemic cardiomyopathy EF 25%, KEVIN on BiPAP who presents with worsening shortness of breath admited for Acute on Chronic Hypoxic Hypercapnic respiratory failure 2/2 COPD/CHF exacerbation S/P MICU admission for IV diuresis, intermittent steroid dosing. Transferred to medical floor.     On 3L via NC, BIPAP 15/6 at night and as needed.

## 2025-03-24 NOTE — ASSESSMENT & PLAN NOTE
Malnutrition Findings:   Adult Malnutrition type: Chronic illness  Adult Degree of Malnutrition: Other severe protein calorie malnutrition  Malnutrition Characteristics: Fat loss, Muscle loss                  360 Statement: severe malnutrition r/t chronic illness as evidenced by severe muscle loss around clavicles and shoulders, moderate-severe muscle loss in temples, severe buccal fat pad loss, apparent depression between ribs suggesting severe fat loss. Treatment: oral diet and oral nutrition supplements    BMI Findings:         Recommend Nutrition Consult  Body mass index is 17.16 kg/m².

## 2025-03-25 LAB
ANION GAP SERPL CALCULATED.3IONS-SCNC: 5 MMOL/L (ref 4–13)
BASE EX.OXY STD BLDV CALC-SCNC: 96.2 % (ref 60–80)
BASE EXCESS BLDV CALC-SCNC: 15.3 MMOL/L
BUN SERPL-MCNC: 27 MG/DL (ref 5–25)
CALCIUM SERPL-MCNC: 8.6 MG/DL (ref 8.4–10.2)
CHLORIDE SERPL-SCNC: 88 MMOL/L (ref 96–108)
CO2 SERPL-SCNC: 42 MMOL/L (ref 21–32)
CREAT SERPL-MCNC: 0.64 MG/DL (ref 0.6–1.3)
ERYTHROCYTE [DISTWIDTH] IN BLOOD BY AUTOMATED COUNT: 13 % (ref 11.6–15.1)
GFR SERPL CREATININE-BSD FRML MDRD: 101 ML/MIN/1.73SQ M
GLUCOSE SERPL-MCNC: 82 MG/DL (ref 65–140)
HCO3 BLDV-SCNC: 42.6 MMOL/L (ref 24–30)
HCT VFR BLD AUTO: 37.4 % (ref 36.5–49.3)
HGB BLD-MCNC: 11.4 G/DL (ref 12–17)
MAGNESIUM SERPL-MCNC: 2.2 MG/DL (ref 1.9–2.7)
MCH RBC QN AUTO: 30.5 PG (ref 26.8–34.3)
MCHC RBC AUTO-ENTMCNC: 30.5 G/DL (ref 31.4–37.4)
MCV RBC AUTO: 100 FL (ref 82–98)
O2 CT BLDV-SCNC: 16.9 ML/DL
PCO2 BLDV: 66.3 MM HG (ref 42–50)
PH BLDV: 7.43 [PH] (ref 7.3–7.4)
PLATELET # BLD AUTO: 172 THOUSANDS/UL (ref 149–390)
PMV BLD AUTO: 10.4 FL (ref 8.9–12.7)
PO2 BLDV: 117.4 MM HG (ref 35–45)
POTASSIUM SERPL-SCNC: 4 MMOL/L (ref 3.5–5.3)
RBC # BLD AUTO: 3.74 MILLION/UL (ref 3.88–5.62)
SODIUM SERPL-SCNC: 135 MMOL/L (ref 135–147)
WBC # BLD AUTO: 7.17 THOUSAND/UL (ref 4.31–10.16)

## 2025-03-25 PROCEDURE — 85027 COMPLETE CBC AUTOMATED: CPT | Performed by: STUDENT IN AN ORGANIZED HEALTH CARE EDUCATION/TRAINING PROGRAM

## 2025-03-25 PROCEDURE — 94664 DEMO&/EVAL PT USE INHALER: CPT

## 2025-03-25 PROCEDURE — 99232 SBSQ HOSP IP/OBS MODERATE 35: CPT | Performed by: INTERNAL MEDICINE

## 2025-03-25 PROCEDURE — 82805 BLOOD GASES W/O2 SATURATION: CPT | Performed by: STUDENT IN AN ORGANIZED HEALTH CARE EDUCATION/TRAINING PROGRAM

## 2025-03-25 PROCEDURE — 94760 N-INVAS EAR/PLS OXIMETRY 1: CPT

## 2025-03-25 PROCEDURE — 94640 AIRWAY INHALATION TREATMENT: CPT

## 2025-03-25 PROCEDURE — 94003 VENT MGMT INPAT SUBQ DAY: CPT

## 2025-03-25 PROCEDURE — 83735 ASSAY OF MAGNESIUM: CPT | Performed by: STUDENT IN AN ORGANIZED HEALTH CARE EDUCATION/TRAINING PROGRAM

## 2025-03-25 PROCEDURE — 80048 BASIC METABOLIC PNL TOTAL CA: CPT

## 2025-03-25 PROCEDURE — 92526 ORAL FUNCTION THERAPY: CPT

## 2025-03-25 PROCEDURE — 99232 SBSQ HOSP IP/OBS MODERATE 35: CPT | Performed by: FAMILY MEDICINE

## 2025-03-25 PROCEDURE — 94660 CPAP INITIATION&MGMT: CPT

## 2025-03-25 RX ORDER — TORSEMIDE 20 MG/1
40 TABLET ORAL DAILY
Status: DISCONTINUED | OUTPATIENT
Start: 2025-03-25 | End: 2025-04-04 | Stop reason: HOSPADM

## 2025-03-25 RX ORDER — ALBUTEROL SULFATE 0.83 MG/ML
2.5 SOLUTION RESPIRATORY (INHALATION) EVERY 4 HOURS PRN
Status: DISCONTINUED | OUTPATIENT
Start: 2025-03-25 | End: 2025-04-04 | Stop reason: HOSPADM

## 2025-03-25 RX ORDER — CLOTRIMAZOLE 1 %
CREAM (GRAM) TOPICAL 2 TIMES DAILY
Status: DISCONTINUED | OUTPATIENT
Start: 2025-03-25 | End: 2025-04-04 | Stop reason: HOSPADM

## 2025-03-25 RX ADMIN — FORMOTEROL FUMARATE DIHYDRATE 20 MCG: 20 SOLUTION RESPIRATORY (INHALATION) at 19:00

## 2025-03-25 RX ADMIN — ALBUTEROL SULFATE 2.5 MG: 2.5 SOLUTION RESPIRATORY (INHALATION) at 09:12

## 2025-03-25 RX ADMIN — HEPARIN SODIUM 5000 UNITS: 5000 INJECTION INTRAVENOUS; SUBCUTANEOUS at 05:32

## 2025-03-25 RX ADMIN — PRAVASTATIN SODIUM 80 MG: 80 TABLET ORAL at 16:04

## 2025-03-25 RX ADMIN — LEVALBUTEROL HYDROCHLORIDE 1.25 MG: 1.25 SOLUTION RESPIRATORY (INHALATION) at 07:24

## 2025-03-25 RX ADMIN — LEVALBUTEROL HYDROCHLORIDE 1.25 MG: 1.25 SOLUTION RESPIRATORY (INHALATION) at 19:00

## 2025-03-25 RX ADMIN — FORMOTEROL FUMARATE DIHYDRATE 20 MCG: 20 SOLUTION RESPIRATORY (INHALATION) at 07:24

## 2025-03-25 RX ADMIN — CLOTRIMAZOLE: 1 CREAM TOPICAL at 17:39

## 2025-03-25 RX ADMIN — CLOTRIMAZOLE: 1 CREAM TOPICAL at 16:05

## 2025-03-25 RX ADMIN — IPRATROPIUM BROMIDE 0.5 MG: 0.5 SOLUTION RESPIRATORY (INHALATION) at 19:00

## 2025-03-25 RX ADMIN — PREDNISONE 10 MG: 10 TABLET ORAL at 11:53

## 2025-03-25 RX ADMIN — IPRATROPIUM BROMIDE 0.5 MG: 0.5 SOLUTION RESPIRATORY (INHALATION) at 07:24

## 2025-03-25 RX ADMIN — DOCUSATE SODIUM 100 MG: 100 CAPSULE, LIQUID FILLED ORAL at 11:53

## 2025-03-25 RX ADMIN — ASPIRIN 81 MG CHEWABLE TABLET 81 MG: 81 TABLET CHEWABLE at 11:53

## 2025-03-25 RX ADMIN — HEPARIN SODIUM 5000 UNITS: 5000 INJECTION INTRAVENOUS; SUBCUTANEOUS at 13:50

## 2025-03-25 RX ADMIN — HEPARIN SODIUM 5000 UNITS: 5000 INJECTION INTRAVENOUS; SUBCUTANEOUS at 21:55

## 2025-03-25 RX ADMIN — DOCUSATE SODIUM 100 MG: 100 CAPSULE, LIQUID FILLED ORAL at 17:39

## 2025-03-25 RX ADMIN — TORSEMIDE 40 MG: 20 TABLET ORAL at 16:05

## 2025-03-25 RX ADMIN — BUDESONIDE 0.5 MG: 0.5 INHALANT RESPIRATORY (INHALATION) at 07:24

## 2025-03-25 RX ADMIN — BUDESONIDE 0.5 MG: 0.5 INHALANT RESPIRATORY (INHALATION) at 19:00

## 2025-03-25 RX ADMIN — LEVALBUTEROL HYDROCHLORIDE 1.25 MG: 1.25 SOLUTION RESPIRATORY (INHALATION) at 13:12

## 2025-03-25 RX ADMIN — IPRATROPIUM BROMIDE 0.5 MG: 0.5 SOLUTION RESPIRATORY (INHALATION) at 13:12

## 2025-03-25 NOTE — RESPIRATORY THERAPY NOTE
Resp Therapy   03/25/25 0910   Respiratory Assessment   Bilateral Breath Sounds Rhonchi   Resp Comments Per pulmonary fellow, pt placed back on bipap for resp distress and given neb thru mask.   Non-Invasive Information   O2 Interface Device Full face mask   Non-Invasive Ventilation Mode BiPAP   $ Continous NIV Subsequent   Non-Invasive Settings   IPAP (cm) 15 cm   EPAP (cm) 6 cm   Rate (Set) 16   FiO2 (%) 30   Non-Invasive Readings   Total Rate 36   MV (Mech) 15.8   Peak Pressure (Obs) 17   Spontaneous Vt (mL) 438

## 2025-03-25 NOTE — ASSESSMENT & PLAN NOTE
Malnutrition Findings:   Adult Malnutrition type: Chronic illness  Adult Degree of Malnutrition: Other severe protein calorie malnutrition  Malnutrition Characteristics: Fat loss, Muscle loss                  360 Statement: severe malnutrition r/t chronic illness as evidenced by severe muscle loss around clavicles and shoulders, moderate-severe muscle loss in temples, severe buccal fat pad loss, apparent depression between ribs suggesting severe fat loss. Treatment: oral diet and oral nutrition supplements    BMI Findings:         Recommend Nutrition Consult  Body mass index is 16.09 kg/m².

## 2025-03-25 NOTE — PLAN OF CARE
Problem: RESPIRATORY - ADULT  Goal: Achieves optimal ventilation and oxygenation  Description: INTERVENTIONS:- Assess for changes in respiratory status- Assess for changes in mentation and behavior- Position to facilitate oxygenation and minimize respiratory effort- Oxygen administered by appropriate delivery if ordered- Initiate smoking cessation education as indicated- Encourage broncho-pulmonary hygiene including cough, deep breathe, Incentive Spirometry- Assess the need for suctioning and aspirate as needed- Assess and instruct to report SOB or any respiratory difficulty- Respiratory Therapy support as indicated  Outcome: Progressing     Problem: Nutrition/Hydration-ADULT  Goal: Nutrient/Hydration intake appropriate for improving, restoring or maintaining nutritional needs  Description: Monitor and assess patient's nutrition/hydration status for malnutrition. Collaborate with interdisciplinary team and initiate plan and interventions as ordered.  Monitor patient's weight and dietary intake as ordered or per policy. Utilize nutrition screening tool and intervene as necessary. Determine patient's food preferences and provide high-protein, high-caloric foods as appropriate.     INTERVENTIONS:  - Monitor oral intake, urinary output, labs, and treatment plans  - Assess nutrition and hydration status and recommend course of action  - Evaluate amount of meals eaten  - Assist patient with eating if necessary   - Allow adequate time for meals  - Recommend/ encourage appropriate diets, oral nutritional supplements, and vitamin/mineral supplements  - Order, calculate, and assess calorie counts as needed  - Recommend, monitor, and adjust tube feedings and TPN/PPN based on assessed needs  - Assess need for intravenous fluids  - Provide specific nutrition/hydration education as appropriate  - Include patient/family/caregiver in decisions related to nutrition  Outcome: Progressing     Problem: RESPIRATORY - ADULT  Goal:  ----- Message from JUANITO Altamirano sent at 9/21/2021 11:54 AM CDT -----  Please call with negative strep. Thank you    Achieves optimal ventilation and oxygenation  Description: INTERVENTIONS:- Assess for changes in respiratory status- Assess for changes in mentation and behavior- Position to facilitate oxygenation and minimize respiratory effort- Oxygen administered by appropriate delivery if ordered- Initiate smoking cessation education as indicated- Encourage broncho-pulmonary hygiene including cough, deep breathe, Incentive Spirometry- Assess the need for suctioning and aspirate as needed- Assess and instruct to report SOB or any respiratory difficulty- Respiratory Therapy support as indicated  Outcome: Progressing     Problem: MUSCULOSKELETAL - ADULT  Goal: Maintain or return mobility to safest level of function  Description: INTERVENTIONS:- Assess patient's ability to carry out ADLs; assess patient's baseline for ADL function and identify physical deficits which impact ability to perform ADLs (bathing, care of mouth/teeth, toileting, grooming, dressing, etc.)- Assess/evaluate cause of self-care deficits - Assess range of motion- Assess patient's mobility- Assess patient's need for assistive devices and provide as appropriate- Encourage maximum independence but intervene and supervise when necessary- Involve family in performance of ADLs- Assess for home care needs following discharge - Consider OT consult to assist with ADL evaluation and planning for discharge- Provide patient education as appropriate  Outcome: Progressing  Goal: Maintain proper alignment of affected body part  Description: INTERVENTIONS:- Support, maintain and protect limb and body alignment- Provide patient/ family with appropriate education  Outcome: Progressing     Problem: MUSCULOSKELETAL - ADULT  Goal: Maintain or return mobility to safest level of function  Description: INTERVENTIONS:- Assess patient's ability to carry out ADLs; assess patient's baseline for ADL function and identify physical deficits which impact ability to perform ADLs (bathing, care  of mouth/teeth, toileting, grooming, dressing, etc.)- Assess/evaluate cause of self-care deficits - Assess range of motion- Assess patient's mobility- Assess patient's need for assistive devices and provide as appropriate- Encourage maximum independence but intervene and supervise when necessary- Involve family in performance of ADLs- Assess for home care needs following discharge - Consider OT consult to assist with ADL evaluation and planning for discharge- Provide patient education as appropriate  Outcome: Progressing     Problem: MUSCULOSKELETAL - ADULT  Goal: Maintain proper alignment of affected body part  Description: INTERVENTIONS:- Support, maintain and protect limb and body alignment- Provide patient/ family with appropriate education  Outcome: Progressing

## 2025-03-25 NOTE — PROGRESS NOTES
Advanced Heart Failure / Pulmonary Hypertension Service Progress Note    Natalio Hartmann Jr. 69 y.o. male   MRN: [id number]  Unit/Bed#: [facility] 431-01; Encounter: [id number]    Assessment:  Principal Problem:    Acute on chronic respiratory failure with hypoxia and hypercapnia (HCC)  Active Problems:    Obstructive sleep apnea    Goals of care, counseling/discussion    Acute on chronic systolic congestive heart failure (HCC)    SIRS (systemic inflammatory response syndrome)    COPD exacerbation (HCC)    Hyperlipidemia    Malnutrition (HCC)    Metabolic encephalopathy    Severe protein-calorie malnutrition (HCC)    69 y.o. male with a PMH of severe COPD, chronic hypoxic and hypercapnic respiratory failure on 4 L supplemental oxygen, cachexia, nonischemic cardiomyopathy, HFrEF --   who presents to the ER 3-14-25 with worsening shortness of breath,  increasingly lethargic feeling, and fatigue.  On day of admit, he was visibly short of breath slumped in the chair noted by his wife and brought to the ER.   In the ED he was noted to be hypoxic and hypercapnic requiring BiPAP.   BNP on admit>1000 (higher than priors).  CXR showed pulmonary congestion.  Unable to wean off Bipap--admitted to MICU.   Heart failure team consulted.   Transfered to floor 3-22-25.     Subjective:   Patient seen and examined. No significant events overnight.     Objective:   Intake/ Output: 730/ 725 (+5)  Weight:  admit 102 lbs--93--93--87 bed (- 6 lbs, -10 lbs total)   Telemetry: SR    Today's Plan:  Euvolemic on exam. Will transition IV lasix to Torsemide 40 mg PO QD today.  Creatinine stable  No current GDMT d/t hypotn  Appreciate Pulmonology input--S/p IV steroids--now on prednisone, s/p IV abx for PNA  Declines palliative care/hospice- agree there should be GOC discussion with family  Repeat BMP in a.m.    Plan:  Acute on Chronic Respiratory Failure with hypoxia and hypercapnia  # COPD exacerbation  -Former smoker; 105 pack years, quit in  "2012.   -Multiple hospitalizations with acute COPD exacerbations   -Required BiPAP in the ER--CO2 on admit 140--> now 44  -Multifactorial with COPD and CHF exacerbation contributing (COPD > CHF) +/- PNA  -treatment of COPD exacerbation per pulmonary / primary team  -completed treatment for possible PNA with ceftriaxone x 7 days  -treatment of CHF as below     HFrEF - acute on chronic, LVEF 25%  ETIOLOGY: NICMP per [facility] in 2019.  Does have +coronary calcium on CT scan.  Possible dyssynchrony from chronic LBBB.  Despite QRS only being 120 ms, echo shows significant dyssynchrony.     VOLUME:  - home diuretic:  torsemide 40 mg QD alternating with 20 mg QD   - inpatient diuretic:  On admission IVC dilated on echo and BNP higher than priors.  Has been getting IV lasix-->Torsemide 40 mg QD today    GDMT:    (Limited by low BP)  - not currently on GDMT    DEVICE:  - he does not have an ICD.   Severe lung disease with life expectancy < 1 year.     Metabolic Encephalopathy (HCC)  - on admit likely d/t Hypercapnia, hypoxia     Nonobstructive CAD  Has coronary calcium on CT  [facility] done 2019  - on aspirin  - on pravastatin     Hyperlipidemia  - on pravastatin     KEVIN   On BiPAP nightly     Cachexia  -BMI 16     GOC.    -palliative care discussed. He is not interested at this time.  Recommend continued discussions.    Vitals:   Blood pressure 107/67, pulse 77, temperature 98 °F (36.7 °C), temperature source Axillary, resp. rate 18, height 5' 2\" (1.575 m), weight 39.9 kg (87 lb 15.4 oz), SpO2 91%.    Body mass index is 16.09 kg/m².    I/O last 3 completed shifts:  In: 730 [P.O.:720; I.V.:10]  Out: 925 [Urine:925]    Wt Readings from Last 10 Encounters:   03/25/25 39.9 kg (87 lb 15.4 oz)   02/21/25 44.3 kg (97 lb 11.2 oz)   09/06/24 44.9 kg (99 lb)   08/13/24 45.2 kg (99 lb 9.6 oz)   07/29/24 44.7 kg (98 lb 9.6 oz)   04/15/24 44.4 kg (97 lb 12.8 oz)   03/11/24 45 kg (99 lb 4.8 oz)   02/04/24 45.4 kg (100 lb)   01/22/24 46.7 kg (103 " lb)   01/10/24 43.5 kg (96 lb)     Vitals:    03/24/25 2237 03/25/25 0300 03/25/25 0724 03/25/25 0738   BP: 100/65   107/67   BP Location:    Right arm   Pulse: 65   77   Resp:    18   Temp: 98.1 °F (36.7 °C)   98 °F (36.7 °C)   TempSrc:    Axillary   SpO2: 100%  99% 91%   Weight:  39.9 kg (87 lb 15.4 oz)     Height:           Physical Exam  Constitutional:       Appearance: He is cachectic. He is ill-appearing.   Neck:      Vascular: No JVD.   Cardiovascular:      Rate and Rhythm: Normal rate and regular rhythm.      Heart sounds: Normal heart sounds, S1 normal and S2 normal.   Pulmonary:      Effort: Respiratory distress present.      Breath sounds: Examination of the right-upper field reveals wheezing. Examination of the left-upper field reveals wheezing. Examination of the right-lower field reveals wheezing. Examination of the left-lower field reveals wheezing. Wheezing present.   Musculoskeletal:      Right lower leg: No edema.      Left lower leg: No edema.   Skin:     General: Skin is warm and dry.   Neurological:      Mental Status: He is alert and oriented to person, place, and time.   Psychiatric:         Behavior: Behavior is cooperative.         Cognition and Memory: Cognition normal.           Current Facility-Administered Medications:     acetaminophen (TYLENOL) tablet 650 mg, 650 mg, Oral, Q6H PRN, Richelle Martines MD    aluminum-magnesium hydroxide-simethicone (MAALOX) oral suspension 30 mL, 30 mL, Oral, Q6H PRN, Richelle Martines MD    aspirin chewable tablet 81 mg, 81 mg, Oral, Daily, Richelle Martines MD, 81 mg at 03/24/25 0855    budesonide (PULMICORT) inhalation solution 0.5 mg, 0.5 mg, Nebulization, Q12H, Richelle Martines MD, 0.5 mg at 03/25/25 0724    [Transfer Hold] chlorhexidine (PERIDEX) 0.12 % oral rinse 15 mL, 15 mL, Mouth/Throat, Q12H GABE, Nguyễn Liu DO, 15 mL at 03/22/25 0932    docusate sodium (COLACE) capsule 100 mg, 100 mg, Oral, BID, Richelle Martines MD, 100 mg at 03/24/25 9534     formoterol (PERFOROMIST) nebulizer solution 20 mcg, 20 mcg, Nebulization, Q12H, Richelle Martines MD, 20 mcg at 03/25/25 0724    heparin (porcine) subcutaneous injection 5,000 Units, 5,000 Units, Subcutaneous, Q8H GABE, Richelle Martines MD, 5,000 Units at 03/25/25 0532    ipratropium (ATROVENT) 0.02 % inhalation solution 0.5 mg, 0.5 mg, Nebulization, TID, Richelle Martines MD, 0.5 mg at 03/25/25 0724    levalbuterol (XOPENEX) inhalation solution 1.25 mg, 1.25 mg, Nebulization, TID, Richelle Martines MD, 1.25 mg at 03/25/25 0724    ondansetron (ZOFRAN) injection 4 mg, 4 mg, Intravenous, Q6H PRN, Richelle Martines MD    polyethylene glycol (MIRALAX) packet 17 g, 17 g, Oral, Daily, Richelle Martines MD, 17 g at 03/24/25 0855    pravastatin (PRAVACHOL) tablet 80 mg, 80 mg, Oral, Daily With Dinner, Richelle Martines MD, 80 mg at 03/24/25 1731    predniSONE tablet 10 mg, 10 mg, Oral, Daily, Richelle Martines MD, 10 mg at 03/24/25 0855    Labs & Results:      Results from last 7 days   Lab Units 03/25/25  0458 03/24/25  0528 03/23/25  0552   WBC Thousand/uL 7.17 5.49 6.09   HEMOGLOBIN g/dL 11.4* 11.0* 11.5*   HEMATOCRIT % 37.4 36.9 36.8   PLATELETS Thousands/uL 172 164 161         Results from last 7 days   Lab Units 03/25/25  0458 03/24/25  0523 03/23/25  0552   POTASSIUM mmol/L 4.0 3.1* 3.8   CHLORIDE mmol/L 88* 87* 86*   CO2 mmol/L 42* 44* >45*   BUN mg/dL 27* 30* 33*   CREATININE mg/dL 0.64 0.70 0.73   CALCIUM mg/dL 8.6 8.7 9.0   ALK PHOS U/L  --  40  --    ALT U/L  --  22  --    AST U/L  --  23  --              Thank you for the opportunity to participate in the care of this patient.    DELIA Betancourt  Advanced Heart Failure  [facility]

## 2025-03-25 NOTE — WOUND OSTOMY CARE
Progress Note - Wound   Natalio Hartmann Jr. 69 y.o. male MRN: [id number]  Unit/Bed#: [facility] 431-01 Encounter: [id number]        Assessment:   Patient admitted to [facility] due to acute on chronic respiratory failure. History of COPD, CHF, cardiomyopathy. Wound care nurse follow-up for bridge of nose pressure injury. Patient is agreeable to assessment, alert and oriented x4, incontinent of bowel and bladder, external male urinary device in use for urine management, patient is assist of 2 to turn for assessment, [employer] system in use for offloading and repositioning, patient is an assist with care.     1. Pressure injury bridge of nose, unstageable-HA- Due to BiPAP mask, they have now started using the face mask to avoid pressure to the nose. Wound is round in shape, approx. 80% yellow tissue and 20% pink tissue, with scant amount of serous drainage noted. Dilcia-wound is dry, intact, scaly, blanchable.     2. Bilateral sacrum, buttock, hips, elbows, and heels- Skin is dry, intact, blanchable.    3. Left plantar aspect of foots- Wounds appear to be blisters  with yellow/white fluid vs purulent fluid. No open aspects or drainage noted. Dilcia-wound is dry, intact, red and scaly skin, appears to be a fungal rash- Primary nurse aware and is notifying physician.    Educated patient on importance of frequent offloading of pressure via turning, repositioning, and weight-shifting. Verbalized understanding of plan of care.    No induration, fluctuance, odor, warmth noted to the above noted wounds. New dressing applied. Patient tolerated well, reports mild to moderate pain to the wound. Primary nurse aware of plan of care. See flow sheets for more detailed assessment findings. Will follow along.    Skin Care Plan:  1-Anterior Bridge of Nose Wound: cleanse with NSS and pat dry. Apply a hydrocolloid dressing to wound bed. Nicolás with T for Treatment and change every other day or PRN.   2-Turn/reposition q2h or when medically stable for pressure  re-distribution on skin.  3-Elevate heels to offload pressure.  4-Moisturize skin daily with skin nourishing cream  5-Ehob cushion in chair when out of bed.  6-Preventative Hydraguard to bilateral sacro-buttocks and heels BID and PRN.   7-B/L foot- Cleanse with soap and water, pat dry. Apply antifungal cream as ordered.    Wound 03/18/25 Pressure Injury Nose (Active)   Wound Image   03/25/25 1128   Wound Description Pink;Yellow 03/25/25 1128   Pressure Injury Stage U 03/25/25 1128   Non-staged Wound Description Not applicable 03/25/25 1128   Wound Length (cm) 0.5 cm 03/25/25 1128   Wound Width (cm) 0.5 cm 03/25/25 1128   Wound Depth (cm) 0.1 cm 03/25/25 1128   Wound Surface Area (cm^2) 0.25 cm^2 03/25/25 1128   Wound Volume (cm^3) 0.025 cm^3 03/25/25 1128   Calculated Wound Volume (cm^3) 0.03 cm^3 03/25/25 1128   Change in Wound Size % 80 03/25/25 1128   Drainage Amount Scant 03/25/25 1128   Drainage Description Serous 03/25/25 1128   Dilcia-wound Assessment Dry;Intact;Scaly 03/25/25 1128   Treatments Cleansed;Site care 03/25/25 1128   Dressing Hydrocolloid 03/25/25 1128   Wound packed? No 03/25/25 1128   Dressing Changed Changed 03/25/25 1128   Patient Tolerance Tolerated well 03/25/25 1128   Dressing Status Clean;Dry;Intact 03/25/25 1128       Wound 03/25/25 Foot Left;Plantar (Active)   Wound Image   03/25/25 1130   Wound Description Dry;Intact;Other (Comment) 03/25/25 1130   Non-staged Wound Description Not applicable 03/25/25 1130   Wound Length (cm) 1 cm 03/25/25 1130   Wound Width (cm) 7 cm 03/25/25 1130   Wound Depth (cm) 0 cm 03/25/25 1130   Wound Surface Area (cm^2) 7 cm^2 03/25/25 1130   Wound Volume (cm^3) 0 cm^3 03/25/25 1130   Calculated Wound Volume (cm^3) 0 cm^3 03/25/25 1130   Drainage Amount None 03/25/25 1130   Treatments Cleansed;Site care 03/25/25 1130   Dressing Moisture barrier 03/25/25 1130   Wound packed? No 03/25/25 1130     Contact through [facility] Secure Chat with any questions  Wound Care will  continue to follow while inpatient    Aleta Rhoades BSN RN CWON  Wound and Ostomy care

## 2025-03-25 NOTE — PHYSICAL THERAPY NOTE
Physical Therapy Cancellation Note         03/25/25 7747   PT Last Visit   PT Visit Date 03/25/25   Note Type   Note Type Cancelled Session   Cancel Reasons Medical status     PT attempted treatment session, patient currently on BiPAP due to respiratory distress. Will hold PT treatment session at this time. PT will continue to follow as able and appropriate.     Ju Liu PT, DPT

## 2025-03-25 NOTE — UTILIZATION REVIEW
Continued Stay Review    Date: 3/25                          Current Patient Class: Inpatient  Current Level of Care: MS    HPI:69 y.o. male initially admitted on 3/14   Current Diagnosis: acute on chronic resp failure     Assessment/Plan:     3/25 IM Note   continues to need intermittent BiPAP due to dyspnea. C/ o intermittent SOB .   Continue diuresis as per cardiology. Transitioned to po torsemide today . Cont to monitor I&o , weights .  Diminished BS gabrielle . -360 ml net output last 24 hrs .  O2 sat 97 % on 3l .   Anticipate discharge in 48-72 hrs to discharge location to be determined pending rehab evaluations.     3/25 Pulm Note  States that breathing is worse   On 3L via NC, BIPAP 15/6 at night and as needed.  While at bedside noted to be tachypneic, placed on BIPAP. To continue for 2 hours and trial off. To place BIPAP as needed for increased work of breathing  Morphine PRN, C/W Fan therapy.  Cont nebs , formoterol , cont prednisone .  Cont lasix .     Medications:   Scheduled Medications:  aspirin, 81 mg, Oral, Daily  budesonide, 0.5 mg, Nebulization, Q12H  [Transfer Hold] chlorhexidine, 15 mL, Mouth/Throat, Q12H GABE  docusate sodium, 100 mg, Oral, BID  formoterol, 20 mcg, Nebulization, Q12H  heparin (porcine), 5,000 Units, Subcutaneous, Q8H GABE  ipratropium, 0.5 mg, Nebulization, TID  levalbuterol, 1.25 mg, Nebulization, TID  polyethylene glycol, 17 g, Oral, Daily  pravastatin, 80 mg, Oral, Daily With Dinner  predniSONE, 10 mg, Oral, Daily      Continuous IV Infusions:     PRN Meds:  acetaminophen, 650 mg, Oral, Q6H PRN  albuterol, 2.5 mg, Nebulization, Q4H PRN  aluminum-magnesium hydroxide-simethicone, 30 mL, Oral, Q6H PRN  ondansetron, 4 mg, Intravenous, Q6H PRN      Discharge Plan: TBD     Vital Signs (last 3 days)       Date/Time Temp Pulse Resp BP MAP (mmHg) SpO2 FiO2 (%) Calculated FIO2 (%) - Nasal Cannula Nasal Cannula O2 Flow Rate (L/min) O2 Device O2 Interface Device Patient Position - Orthostatic VS  Tessy Coma Scale Score Pain    03/25/25 0914 -- -- -- -- -- 93 % -- -- -- -- -- -- -- --    03/25/25 0910 -- -- -- -- -- -- -- -- -- -- Full face mask -- -- --    03/25/25 07:38:58 98 °F (36.7 °C) 77 18 107/67 80 91 % -- -- -- None (Room air) -- Lying -- --    03/25/25 0724 -- -- -- -- -- 99 % -- -- -- -- -- -- -- --    03/25/25 0337 -- -- -- -- -- -- -- -- -- -- Full face mask -- -- --    03/24/25 2355 -- -- -- -- -- -- -- -- -- -- Full face mask -- -- --    03/24/25 22:37:56 98.1 °F (36.7 °C) 65 -- 100/65 77 100 % -- -- -- -- -- -- -- --    03/24/25 2015 -- -- -- -- -- 98 % 35 -- -- -- -- -- 15 No Pain    03/24/25 1900 -- -- -- -- -- 100 % -- -- -- -- -- -- -- --    03/24/25 18:37:12 98 °F (36.7 °C) 84 -- 110/76 87 98 % -- -- -- -- -- -- -- --    03/24/25 1500 97.9 °F (36.6 °C) -- -- -- -- -- -- -- -- None (Room air) -- Lying -- --    03/24/25 14:56:56 97.9 °F (36.6 °C) 74 -- 103/62 76 93 % -- -- -- -- -- -- -- --    03/24/25 1400 -- -- -- -- -- 99 % -- -- -- -- -- -- -- --    03/24/25 11:09:42 98.2 °F (36.8 °C) 67 18 102/63 76 100 % -- -- -- -- -- -- -- --    03/24/25 0859 -- -- -- -- -- 98 % 35 32 3 L/min Nasal cannula -- -- 15 No Pain    03/24/25 07:22:33 98.5 °F (36.9 °C) 70 18 103/64 77 99 % -- -- -- BiPAP -- Lying -- --    03/24/25 0715 -- -- -- -- -- 97 % -- -- -- -- -- -- -- --    03/24/25 0336 -- -- -- -- -- -- -- -- -- -- Full face mask -- -- --    03/24/25 02:42:51 97.7 °F (36.5 °C) 66 18 102/64 77 99 % -- -- -- -- -- Lying -- --    03/23/25 23:29:16 -- 66 -- 103/66 78 100 % -- -- -- -- -- -- -- --    03/23/25 2100 -- -- -- -- -- -- -- 32 3 L/min Nasal cannula -- -- 15 No Pain    03/23/25 20:57:16 -- 72 -- 104/66 79 100 % -- -- -- -- -- -- -- --    03/23/25 2024 -- -- -- -- -- -- -- -- -- -- Full face mask -- -- --    03/23/25 19:16:15 97.6 °F (36.4 °C) 72 18 105/66 79 99 % -- -- -- Nasal cannula -- Lying -- --    03/23/25 15:18:47 98 °F (36.7 °C) 70 17 106/67 80 96 % -- -- -- Nasal cannula -- Lying  -- --    03/23/25 11:18:48 98.5 °F (36.9 °C) 77 -- 108/67 81 97 % -- -- -- Nasal cannula -- Lying -- --    03/23/25 0850 -- -- -- -- -- 96 % -- -- -- Nasal cannula -- -- 15 No Pain    03/23/25 0847 -- -- -- -- -- 99 % -- 32 3 L/min Nasal cannula -- -- -- --    03/23/25 08:05:55 97.6 °F (36.4 °C) 76 -- 105/69 81 99 % -- -- -- Nasal cannula -- Lying -- --    03/23/25 0746 -- -- -- -- -- -- -- -- -- -- Face mask -- -- --    03/23/25 0230 -- -- -- -- -- -- -- 32 3 L/min Nasal cannula -- -- -- --    03/23/25 02:13:06 -- 71 -- 107/70 82 100 % -- -- -- -- -- Lying -- --    03/22/25 22:17:23 97.8 °F (36.6 °C) 80 16 105/69 81 96 % -- -- -- -- -- Lying -- --    03/22/25 2106 -- -- -- -- -- -- -- -- -- -- Face mask -- -- --    03/22/25 2002 -- -- -- -- -- -- -- 28 2 L/min Nasal cannula -- -- -- --    03/22/25 2000 -- -- -- -- -- -- -- -- -- -- -- -- 15 No Pain    03/22/25 18:38:38 98 °F (36.7 °C) 75 -- 108/69 82 95 % -- -- -- -- -- -- -- --    03/22/25 1835 -- -- -- -- -- 95 % -- 28 2 L/min Nasal cannula -- -- 15 No Pain    03/22/25 1800 -- 73 42 108/63 82 98 % -- -- -- -- -- -- -- --    03/22/25 1700 -- 70 57 96/65 77 96 % -- -- -- -- -- -- -- --    03/22/25 1600 -- 74 41 98/69 78 95 % -- -- -- -- -- -- -- --    03/22/25 1529 98.7 °F (37.1 °C) -- -- -- -- -- -- -- -- -- -- -- -- --    03/22/25 1500 -- 83 28 109/72 83 96 % -- -- -- -- -- -- -- --    03/22/25 1430 -- -- -- -- -- 95 % -- -- -- -- Face mask -- -- --    03/22/25 1400 -- 83 31 102/72 82 97 % -- -- -- BiPAP -- -- -- --    03/22/25 1200 -- 84 41 121/67 87 92 % -- -- -- -- -- -- -- --    03/22/25 1100 -- 80 37 106/68 83 94 % -- -- -- -- -- -- -- --    03/22/25 1000 -- 74 39 114/73 90 96 % -- -- -- -- -- -- -- --    03/22/25 0900 97.9 °F (36.6 °C) 69 40 114/69 86 97 % -- 28 2 L/min Nasal cannula -- Lying -- --    03/22/25 0800 -- 63 25 102/55 74 98 % -- -- -- -- -- -- 15 No Pain    03/22/25 0746 -- -- -- -- -- -- -- -- -- -- Face mask -- -- --    03/22/25 0700 -- 67 29  107/69 82 97 % -- -- -- -- -- -- -- --    03/22/25 0600 -- 61 23 105/71 83 99 % -- -- -- -- -- -- -- --    03/22/25 0500 97.4 °F (36.3 °C) 66 32 126/74 94 100 % -- -- -- -- -- -- -- --    03/22/25 0423 -- -- -- -- -- -- -- -- -- -- Face mask -- -- --    03/22/25 0400 -- 64 18 102/67 81 98 % -- -- -- -- -- -- 15 No Pain    03/22/25 0300 -- 64 24 91/65 74 97 % -- -- -- -- -- -- -- --    03/22/25 0200 -- 72 27 113/79 93 98 % -- -- -- -- -- -- -- --    03/22/25 0100 -- 65 18 104/67 81 99 % -- -- -- -- -- -- -- --    03/22/25 0000 97.6 °F (36.4 °C) 69 23 106/68 83 100 % -- -- -- -- -- -- 15 No Pain          Weight (last 2 days)       Date/Time Weight    03/25/25 0300 39.9 (87.96)    03/24/25 0532 42.5 (93.8)    03/24/25 0500 42.5 (93.8)    03/23/25 0600 41.8 (92.15)            Pertinent Labs/Diagnostic Results:   Radiology:  XR chest portable   Final Interpretation by Suhas Graham MD (03/14 1320)      Cardiomegaly and prominent vasculature, possibly related to positioning though pulmonary vascular congestion is not excluded. Clinical correlation for congestive heart failure recommended.            Workstation performed: [id number]           Cardiology:  ECG 12 lead   Final Result by Nguyễn Funez MD (03/17 5388)   Normal sinus rhythm   Left bundle branch block   Abnormal ECG   When compared with ECG of 17-Mar-2025 13:37, (unconfirmed)   Premature ventricular complexes are no longer Present   Confirmed by Nguyễn Funez ([id number]) on 3/17/2025 1:54:31 PM      ECG 12 lead   Final Result by Nguyễn Funez MD (03/17 1354)   Sinus rhythm with occasional Premature ventricular complexes   Left bundle branch block   Abnormal ECG   No previous ECGs available   Confirmed by Nguyễn Funez ([id number]) on 3/17/2025 1:54:33 PM      ECG 12 lead   Final Result by Nguyễn Funez MD (03/17 1328)   Normal sinus rhythm   Left bundle branch block   Abnormal ECG   When compared with ECG of 17-Mar-2025 01:44, (unconfirmed)   Vent. rate  has increased by  36 bpm   Confirmed by Nguyễn Funez ([id number]) on 3/17/2025 1:28:16 PM      ECG 12 lead   Final Result by Nguyễn Funez MD (03/17 2100)   Normal sinus rhythm   Left bundle branch block   Abnormal ECG   When compared with ECG of 16-Mar-2025 17:13, (unconfirmed)   No significant change was found   Confirmed by Nguyễn Funez ([id number]) on 3/17/2025 1:30:08 PM      ECG 12 lead   Final Result by Nguyễn Funez MD (03/17 6462)   Normal sinus rhythm   Left bundle branch block   Abnormal ECG   When compared with ECG of 16-Mar-2025 15:45, (unconfirmed)   No significant change was found   Confirmed by Nguyễn Funez ([id number]) on 3/17/2025 1:32:17 PM      ECG 12 lead   Final Result by Nguynễ Funez MD (03/17 5622)   Sinus rhythm with marked sinus arrhythmia   Left bundle branch block   Abnormal ECG   When compared with ECG of 16-Mar-2025 09:21, (unconfirmed)   Vent. rate has decreased by  46 bpm   Confirmed by Nguyễn Funez ([id number]) on 3/17/2025 1:32:21 PM      Echo complete w/ contrast if indicated   Final Result by Lewis Macias MD (03/17 0157)        Left Ventricle: Left ventricular cavity size is severely dilated. Wall    thickness is normal. The left ventricular ejection fraction is 25%.    Systolic function is severely reduced. There is global hypokinesis with    regional variation. Diastolic function is mildly abnormal, consistent with    grade I (abnormal) relaxation.     IVS: There is abnormal septal motion consistent with left bundle branch    block.     Right Ventricle: Right ventricular cavity size is moderately dilated.    Systolic function is low normal.     Left Atrium: The atrium is severely dilated.     Tricuspid Valve: There is mild regurgitation. The right ventricular    systolic pressure is mildly elevated. The estimated right ventricular    systolic pressure is 42.00 mmHg.         ECG 12 lead   Final Result by Nguyễn Funez MD (03/17 8052)   Sinus tachycardia   Left bundle branch  block   Abnormal ECG   When compared with ECG of 15-Mar-2025 19:01, (unconfirmed)   Vent. rate has increased by  48 bpm   Confirmed by Nguyễn Funez ([id number]) on 3/17/2025 1:32:35 PM      ECG 12 lead   Final Result by Nguyễn Funez MD (03/17 1339)   Sinus rhythm with blocked PACs   Left bundle branch block   Abnormal ECG   When compared with ECG of 15-Mar-2025 10:17, (unconfirmed)   No significant change was found   Confirmed by Nguyễn Funez ([id number]) on 3/17/2025 1:39:27 PM      ECG 12 lead   Final Result by Nguyễn Funez MD (03/17 1342)   Sinus rhythm with Premature atrial complexes   Left bundle branch block   Abnormal ECG   No previous ECGs available   Confirmed by Nguyễn Funez ([id number]) on 3/17/2025 1:42:30 PM      ECG 12 lead   Final Result by Jae Martines MD (03/15 3801)   ** Poor data quality, interpretation may be adversely affected   Possible Sinus rhythm with occasional Premature ectopic complexes   Incomplete left bundle branch block   Baseline Artifact   Abnormal ECG   Confirmed by Jae Martines ([id number]) on 3/15/2025 7:27:37 AM        GI:  No orders to display           Results from last 7 days   Lab Units 03/25/25  0458 03/24/25  0528 03/23/25  0552 03/22/25  0448 03/21/25  0513   WBC Thousand/uL 7.17 5.49 6.09 7.96 5.42   HEMOGLOBIN g/dL 11.4* 11.0* 11.5* 12.1 11.9*   HEMATOCRIT % 37.4 36.9 36.8 39.9 38.6   PLATELETS Thousands/uL 172 164 161 168 158   TOTAL NEUT ABS Thousands/µL  --   --  4.45 6.45 3.98         Results from last 7 days   Lab Units 03/25/25  0458 03/24/25  0523 03/23/25  0552 03/22/25  0448 03/21/25  1310 03/21/25  0513 03/20/25  1417 03/20/25  0520   SODIUM mmol/L 135 137 135 138  --  139   < > 138   POTASSIUM mmol/L 4.0 3.1* 3.8 4.0  --  3.8   < > 3.3*   CHLORIDE mmol/L 88* 87* 86* 89*  --  89*   < > 91*   CO2 mmol/L 42* 44* >45* 44*  --  45*   < > 43*   ANION GAP mmol/L 5 6  --  5  --  5  --  4   BUN mg/dL 27* 30* 33* 35*  --  34*   < > 42*   CREATININE mg/dL 0.64 0.70 0.73 0.60   --  0.56*   < > 0.55*   EGFR ml/min/1.73sq m 101 97 95 104  --  107   < > 107   CALCIUM mg/dL 8.6 8.7 9.0 8.9  --  9.1   < > 8.3*   MAGNESIUM mg/dL 2.2 2.3 2.1 2.0  --  2.2  --  2.0   PHOSPHORUS mg/dL  --  3.4 2.0* 2.8 4.6* 3.6   < > 1.9*    < > = values in this interval not displayed.     Results from last 7 days   Lab Units 03/24/25  0523   AST U/L 23   ALT U/L 22   ALK PHOS U/L 40   TOTAL PROTEIN g/dL 5.9*   ALBUMIN g/dL 3.6   TOTAL BILIRUBIN mg/dL 0.68         Results from last 7 days   Lab Units 03/25/25  0458 03/24/25  0523 03/23/25  0552 03/22/25  0448 03/21/25  0513 03/20/25  1417 03/20/25  0520 03/19/25  0529   GLUCOSE RANDOM mg/dL 82 116 99 68 93 129 123 107         Results from last 7 days   Lab Units 03/25/25  0913 03/19/25  2127 03/19/25  1432   PH JHONATHAN  7.426* 7.344 7.378   PCO2 JHONATHAN mm Hg 66.3* 83.4* 79.3*   PO2 JHONATHAN mm Hg 117.4* 58.3* 59.8*   HCO3 JHONATHAN mmol/L 42.6* 44.4* 45.6*   BASE EXC JHONATHAN mmol/L 15.3 14.8 16.6   O2 CONTENT JHONATHAN ml/dL 16.9 16.0 15.8   O2 HGB, VENOUS % 96.2* 87.4* 87.8*       Network Utilization Review Department  ATTENTION: Please call with any questions or concerns to [phone] and carefully listen to the prompts so that you are directed to the right person. All voicemails are confidential.   For Discharge needs, contact Care Management DC Support Team at [phone] opt. 2  Send all requests for admission clinical reviews, approved or denied determinations and any other requests to dedicated fax number below belonging to the campus where the patient is receiving treatment. List of dedicated fax numbers for the Facilities:  FACILITY NAME UR FAX NUMBER   ADMISSION DENIALS (Administrative/Medical Necessity) [phone]   DISCHARGE SUPPORT TEAM (NETWORK) [phone]   PARENT CHILD HEALTH (Maternity/NICU/Pediatrics) [phone]   [facility] [phone]   [facility] [phone]   [facility]  [phone]   [facility] [phone]   [facility] [phone]   [facility] [phone]   [facility] [phone]   [facility] [phone]   [facility] [phone]   [facility] [phone]   [facility] [phone]   [facility] [phone]

## 2025-03-25 NOTE — OCCUPATIONAL THERAPY NOTE
Occupational Therapy Cancel        Patient Name: Natalio Hartmann Jr.  Today's Date: 3/25/2025         03/25/25 1129   OT Last Visit   OT Visit Date 03/25/25   Note Type   Note type Cancelled Session   Cancel Reasons Medical status   Additional Comments Pt on BiPAP, OT treatment not appropriate at this time. Will hold OT, OT will continue to follow as appropriate/able.     NOLAN Gusman, OTR/L

## 2025-03-25 NOTE — ASSESSMENT & PLAN NOTE
Malnutrition Findings:   Adult Malnutrition type: Chronic illness  Adult Degree of Malnutrition: Other severe protein calorie malnutrition  Malnutrition Characteristics: Fat loss, Muscle loss              360 Statement: severe malnutrition r/t chronic illness as evidenced by severe muscle loss around clavicles and shoulders, moderate-severe muscle loss in temples, severe buccal fat pad loss, apparent depression between ribs suggesting severe fat loss. Treatment: oral diet and oral nutrition supplements  BMI Findings:         Body mass index is 16.09 kg/m².

## 2025-03-25 NOTE — RESPIRATORY THERAPY NOTE
Resp Therapy   03/25/25 1312   Respiratory Assessment   Resp Comments Messaged by nurse that pt was asking to be placed on bipap for resp distress. Pt had been off since 1130. Pt placed on bipap and same settings resumed, pt given scheduled nebs thru mask, will continue to monitor pt.   Non-Invasive Information   O2 Interface Device Full face mask   SpO2 95 %   $ Pulse Oximetry Spot Check Charge Completed   Non-Invasive Settings   IPAP (cm) 15 cm   EPAP (cm) 6 cm   Rate (Set) 16   FiO2 (%) 30   Pressure Support (cm H2O) 9   Rise Time 3   Inspiratory Time (Set) 0.7   Non-Invasive Readings   Total Rate 32   MV (Mech) 11.9   Peak Pressure (Obs) 16   Spontaneous Vt (mL) 331   Leak (lpm) 49   Skin Intervention Skin barrier applied

## 2025-03-25 NOTE — PROGRESS NOTES
Progress Note - Pulmonology   Name: Natalio Hartmann Jr. 69 y.o. male I MRN: [id number]  Unit/Bed#: [facility] 431-01 I Date of Admission: 3/14/2025   Date of Service: 3/25/2025 I Hospital Day: 11    Assessment & Plan  Acute on chronic respiratory failure with hypoxia and hypercapnia (HCC)  69-year-old male with medical history of very severe COPD FEV1 22%, chronic hypoxic and hypercapnic respiratory failure on 4 L supplemental O2 + Trilogy NIV at night, chronic prednisone 10 mg, COPD cachexia nonischemic cardiomyopathy EF 25%, KEVIN on BiPAP who presents with worsening shortness of breath admited for Acute on Chronic Hypoxic Hypercapnic respiratory failure 2/2 COPD/CHF exacerbation S/P MICU admission for IV diuresis, intermittent steroid dosing. Transferred to medical floor.     On 3L via NC, BIPAP 15/6 at night and as needed.  While at bedside noted to be tachypneic, placed on BIPAP. To continue for 2 hours and trial off. To place BIPAP as needed for increased work of breathing  Morphine PRN, C/W Fan therapy  Re-engage Palliative team for [facility]  COPD exacerbation (HCC)  Continue nebulized bronchodilators budesonide, formoterol  Pulmonary toileting  Continue with prednisone 10 mg daily (baseline outpt steroid dosing)  Obstructive sleep apnea  On Trilogy NIV at night at home  C/W BIPAP as above  Acute on chronic systolic congestive heart failure (HCC)  Wt Readings from Last 3 Encounters:   03/25/25 39.9 kg (87 lb 15.4 oz)   02/21/25 44.3 kg (97 lb 11.2 oz)   09/06/24 44.9 kg (99 lb)   Acute decompensated CHF    BNP elevated 1079  2D echo 9/2024 EF 25% systolic function severely reduced,  severe global hypokinesis, grade 1 diastolic dysfunction LA moderately dilated mild TR, no aortic stenosis, trace MR, mild TR mildly elevated RVSP at 42, no pericardial effusion  Continue with Lasix 80 mg daily  Heart failure following appreciate recommendations  Hyperlipidemia    Malnutrition (HCC)  Malnutrition Findings:   Adult Malnutrition  type: Chronic illness  Adult Degree of Malnutrition: Other severe protein calorie malnutrition  Malnutrition Characteristics: Fat loss, Muscle loss                  360 Statement: severe malnutrition r/t chronic illness as evidenced by severe muscle loss around clavicles and shoulders, moderate-severe muscle loss in temples, severe buccal fat pad loss, apparent depression between ribs suggesting severe fat loss. Treatment: oral diet and oral nutrition supplements    BMI Findings:           Body mass index is 16.09 kg/m².     Metabolic encephalopathy  Improved likely in setting of acute on chronic hypercapnic respiratory failure  Patient Aox3  Severe protein-calorie malnutrition (HCC)  Malnutrition Findings:   Adult Malnutrition type: Chronic illness  Adult Degree of Malnutrition: Other severe protein calorie malnutrition  Malnutrition Characteristics: Fat loss, Muscle loss                  360 Statement: severe malnutrition r/t chronic illness as evidenced by severe muscle loss around clavicles and shoulders, moderate-severe muscle loss in temples, severe buccal fat pad loss, apparent depression between ribs suggesting severe fat loss. Treatment: oral diet and oral nutrition supplements    BMI Findings:         Recommend Nutrition Consult  Body mass index is 16.09 kg/m².   Goals of care, counseling/discussion  Palliative consulted during MICU course.   SIRS (systemic inflammatory response syndrome)  S/P Ceftriaxone, Azithromycin    Recommendations  - Monitor oxygen & supplement as needed for goal above 88%  -currently on baseline O2 3 L via nasal cannula.   - Place on BIPAP stat for Increased WOB  - Fan therapy, consider PRN Morphine for dyspnea  - Diuresis held today per primary/heart failure team  - Continue nebulized bronchodilators budesonide, formoterol  - C/W BIPAP at night and PRN  - D/W RT re proper mask size fitting  - Pulmonary toileting  - Continue with prednisone 10 mg daily (baseline steroid dosing)  -  PT OT, mobility as tolerated    24 Hour Events : No acute events over past 24 hours.   Subjective : Seen and examined at bedside.  States that breathing is worse today denies chest pain, palpitations cough fever chills.  States that he is having BiPAP mask, feels like it is too big for his face.     Objective :  Temp:  [97.9 °F (36.6 °C)-98.2 °F (36.8 °C)] 98 °F (36.7 °C)  HR:  [65-84] 77  BP: (100-110)/(62-76) 107/67  Resp:  [18] 18  SpO2:  [89 %-100 %] 89 %  O2 Device: None (Room air)  Nasal Cannula O2 Flow Rate (L/min):  [3 L/min] 3 L/min  FiO2 (%):  [35] 35    Physical Exam  Constitutional:       General: He is in acute distress.      Appearance: He is ill-appearing.   HENT:      Head: Normocephalic and atraumatic.      Mouth/Throat:      Mouth: Mucous membranes are dry.   Pulmonary:      Effort: Respiratory distress present.      Breath sounds: No wheezing, rhonchi or rales.      Comments: Breath sounds distant  Chest:      Chest wall: No tenderness.   Abdominal:      General: Abdomen is flat. There is no distension.      Palpations: Abdomen is soft.   Musculoskeletal:      Right lower leg: No edema.      Left lower leg: No edema.   Skin:     General: Skin is warm and dry.      Capillary Refill: Capillary refill takes less than 2 seconds.   Neurological:      Mental Status: He is oriented to person, place, and time.   Psychiatric:         Mood and Affect: Mood normal.           Lab Results: I have reviewed the following results:   .     03/25/25  0458   WBC 7.17   HGB 11.4*   HCT 37.4      SODIUM 135   K 4.0   CL 88*   CO2 42*   BUN 27*   CREATININE 0.64   GLUC 82   MG 2.2     ABG: No new results in last 24 hours.    Imaging Results Review: I personally reviewed the following image studies/reports in PACS and discussed pertinent findings with Radiology: chest xray. My interpretation of the radiology images/reports is:  . 3/14: CXR:significant for bilateral infiltrates concerning for pulmonary vascular  congestion   Other Study Results Review: No additional pertinent studies reviewed.  PFT Results Reviewed: reviewed

## 2025-03-25 NOTE — ASSESSMENT & PLAN NOTE
History of COPD and CHF, baseline O2 needs of 4 L via nasal cannula  Uses trilogy NIV at night and is on prednisone 10 mg daily chronically  Admitted to ICU for IV diuresis and respiratory support with BiPAP, steroids, nebulizers,  improved and tansferred to floor on 3/22  Continue supplemental oxygen to keep at sats more than 88%  Encourage incentive spirometry as able  Pulmonary following, continues to need intermittent BiPAP due to dyspnea  Continue diuresis as per cardiology

## 2025-03-25 NOTE — ASSESSMENT & PLAN NOTE
Malnutrition Findings:     Body mass index is 16.09 kg/m².   Encourage adequate protein and calorie intake

## 2025-03-25 NOTE — ASSESSMENT & PLAN NOTE
Wt Readings from Last 3 Encounters:   03/25/25 39.9 kg (87 lb 15.4 oz)   02/21/25 44.3 kg (97 lb 11.2 oz)   09/06/24 44.9 kg (99 lb)   Known history of nonischemic cardiomyopathy with LVEF 25%  Presents with worsening shortness of breath volume overload state noted  Admitted to ICU, volume status was improving and patient deemed appropriate for discharge to the floor on 3/22  transitioned to PO diuretics on 3/25- torsemide 40 mg daily  Follow I's/O, daily weight, and monitor

## 2025-03-25 NOTE — ASSESSMENT & PLAN NOTE
69-year-old male with medical history of very severe COPD FEV1 22%, chronic hypoxic and hypercapnic respiratory failure on 4 L supplemental O2 + Trilogy NIV at night, chronic prednisone 10 mg, COPD cachexia nonischemic cardiomyopathy EF 25%, KEVIN on BiPAP who presents with worsening shortness of breath admited for Acute on Chronic Hypoxic Hypercapnic respiratory failure 2/2 COPD/CHF exacerbation S/P MICU admission for IV diuresis, intermittent steroid dosing. Transferred to medical floor.     On 3L via NC, BIPAP 15/6 at night and as needed.  While at bedside noted to be tachypneic, placed on BIPAP. To continue for 2 hours and trial off. To place BIPAP as needed for increased work of breathing  Morphine PRN, C/W Fan therapy  Re-engage Palliative team for [facility]

## 2025-03-25 NOTE — SPEECH THERAPY NOTE
Speech Language/Pathology    Speech/Language Pathology Progress Note    Patient Name: Natalio Hartmann Jr.  Today's Date: 3/25/2025     Problem List  Principal Problem:    Acute on chronic respiratory failure with hypoxia and hypercapnia (HCC)  Active Problems:    Obstructive sleep apnea    Goals of care, counseling/discussion    Acute on chronic systolic congestive heart failure (HCC)    SIRS (systemic inflammatory response syndrome)    COPD exacerbation (HCC)    Hyperlipidemia    Malnutrition (HCC)    Metabolic encephalopathy    Severe protein-calorie malnutrition (HCC)       Past Medical History  Past Medical History:   Diagnosis Date    Acute metabolic encephalopathy 03/29/2022    Arthritis     Bladder mass     Cardiomyopathy (HCC)     Chest pain     COPD (chronic obstructive pulmonary disease) (HCC)     COVID-19 virus infection 02/23/2023    CPAP (continuous positive airway pressure) dependence     Emphysema of lung (HCC)     Hypoxia     nocturnal    Left bundle branch block     Multiple pulmonary nodules     last assessed: 10/12/16    Pneumonia     Sleep apnea, obstructive     Smoker     Weight loss 08/29/2019        Past Surgical History  Past Surgical History:   Procedure Laterality Date    COLONOSCOPY      CYSTOSCOPY      CYSTOSCOPY  02/17/2021    MA BLADDER INSTILLATION ANTICARCINOGENIC AGENT N/A 1/3/2020    Procedure: INSTILLATION MITOMYCIN;  Surgeon: Sundeep Canchola MD;  Location: [facility];  Service: Urology    MA CYSTO W/REMOVAL OF LESIONS SMALL N/A 1/3/2020    Procedure: TRANSURETHRAL RESECTION OF BLADDER TUMOR (TURBT);  Surgeon: uSndeep Canchola MD;  Location: [facility];  Service: Urology    MA CYSTOURETHROSCOPY WITH BIOPSY N/A 4/13/2021    Procedure: CYSTOSCOPY WITH BIOPSIES;  Surgeon: Sundeep Canchola MD;  Location: [facility];  Service: Urology    MA TRURL ELECTROSURG RESCJ PROSTATE BLEED COMPLETE N/A 4/13/2021    Procedure: TRANSURETHRAL RESECTION OF PROSTATE (TURP) BLADDER BIOPSY, FULGURATION;   "Surgeon: Sundeep Canchola MD;  Location: [facility];  Service: Urology         Subjective:  Pt seen for dysphagia tx. Pt alert, positioned mostly upright in bed.     Objective:  Reviewed chart, discussed with RN. Pt was reportedly more lethargic and on bipap longer than usual this morning. He is now alert, does appear fatigued. Pt was fed a few bites of lunch, including chopped chicken tenders, corn, and mashed potatoes. He drank sips of thin liquid via straw. Mastication was prolonged, but functional. No oral residue observed. Hyolaryngeal elevation observed during pharyngeal swallows. There were no overt s/s of aspiration. After a few bites/sips, pt asked to take a break, stating \"I don't want to push it\". He reported feeling somewhat SOB. SpO2 was stable in the 90s with NC. Educated pt on aspiration precautions, encouraged him to pace himself and take breaks as needed during meal.     Assessment:  Pt appears to be tolerating current diet, with prolonged but functional mastication with regular solids. Pt is aware of need to pace himself and take breaks as needed while eating.     Plan/Recommendations:  Continue regular diet and thin liquids. Chop food into small pieces. Feed slowly, small bites/sip. Take breaks as needed if pt becomes short of breath. ST to follow up.      "

## 2025-03-25 NOTE — ASSESSMENT & PLAN NOTE
Wt Readings from Last 3 Encounters:   03/25/25 39.9 kg (87 lb 15.4 oz)   02/21/25 44.3 kg (97 lb 11.2 oz)   09/06/24 44.9 kg (99 lb)   Acute decompensated CHF    BNP elevated 1079  2D echo 9/2024 EF 25% systolic function severely reduced,  severe global hypokinesis, grade 1 diastolic dysfunction LA moderately dilated mild TR, no aortic stenosis, trace MR, mild TR mildly elevated RVSP at 42, no pericardial effusion  Continue with Lasix 80 mg daily  Heart failure following appreciate recommendations

## 2025-03-25 NOTE — PROGRESS NOTES
Progress Note - Hospitalist   Name: Natalio Hartmann Jr. 69 y.o. male I MRN: [id number]  Unit/Bed#: [facility] 431-01 I Date of Admission: 3/14/2025   Date of Service: 3/25/2025 I Hospital Day: 11    Assessment & Plan  Acute on chronic respiratory failure with hypoxia and hypercapnia (HCC)  History of COPD and CHF, baseline O2 needs of 4 L via nasal cannula  Uses trilogy NIV at night and is on prednisone 10 mg daily chronically  Admitted to ICU for IV diuresis and respiratory support with BiPAP, steroids, nebulizers,  improved and tansferred to floor on 3/22  Continue supplemental oxygen to keep at sats more than 88%  Encourage incentive spirometry as able  Pulmonary following, continues to need intermittent BiPAP due to dyspnea  Continue diuresis as per cardiology  Acute on chronic systolic congestive heart failure (HCC)  Wt Readings from Last 3 Encounters:   03/25/25 39.9 kg (87 lb 15.4 oz)   02/21/25 44.3 kg (97 lb 11.2 oz)   09/06/24 44.9 kg (99 lb)   Known history of nonischemic cardiomyopathy with LVEF 25%  Presents with worsening shortness of breath volume overload state noted  Admitted to ICU, volume status was improving and patient deemed appropriate for discharge to the floor on 3/22  transitioned to PO diuretics on 3/25- torsemide 40 mg daily  Follow I's/O, daily weight, and monitor  COPD exacerbation (HCC)  Monitored by pulmonology during hospitalization.  Low concern for acute exacerbation  Continue inhaler regimen and prednisone 10 mg daily  SIRS (systemic inflammatory response syndrome)  SIRS present on admission, patient initially covered for suspected pneumonia. Symptoms felt more likely to be related to CHF, and abx stopped after 4 days  Blood cultures negative  Monitor off antibiotics  Obstructive sleep apnea  BiPAP as needed and at bedtime, patient appears to be tolerating  Metabolic encephalopathy  Secondary to respiratory failure which is multifactorial related to COPD and CHF.  Probable hypoxia and  hypercapnia component as well  Status appears to be improving with nocturnal BIPAP  Continue supportive measures and monitor  Severe protein-calorie malnutrition (HCC)  Malnutrition Findings:     Body mass index is 16.09 kg/m².   Encourage adequate protein and calorie intake  Goals of care, counseling/discussion  Patient with multiple comorbidities and overall guarded prognosis.  Discussed with spouse at bedside  Continue Level 1 Full Code at this time  Hyperlipidemia  Resume Pravachol 80mg po qd  Malnutrition (HCC)  Malnutrition Findings:   Adult Malnutrition type: Chronic illness  Adult Degree of Malnutrition: Other severe protein calorie malnutrition  Malnutrition Characteristics: Fat loss, Muscle loss              360 Statement: severe malnutrition r/t chronic illness as evidenced by severe muscle loss around clavicles and shoulders, moderate-severe muscle loss in temples, severe buccal fat pad loss, apparent depression between ribs suggesting severe fat loss. Treatment: oral diet and oral nutrition supplements  BMI Findings:         Body mass index is 16.09 kg/m².     VTE Pharmacologic Prophylaxis: VTE Score: 8 High Risk (Score >/= 5) - Pharmacological DVT Prophylaxis Ordered: heparin. Sequential Compression Devices Ordered.    Mobility:   Basic Mobility Inpatient Raw Score: 14  JH-HLM Goal: 4: Move to chair/commode  JH-HLM Achieved: 1: Laying in bed  JH-HLM Goal NOT achieved. Continue with multidisciplinary rounding and encourage appropriate mobility to improve upon JH-HLM goals.    Patient Centered Rounds: I performed bedside rounds with nursing staff today.   Discussions with Specialists or Other Care Team Provider: RN; cardiology noted reviewed    Education and Discussions with Family / Patient:  discussed with the patient.     Current Length of Stay: 11 day(s)  Current Patient Status: Inpatient   Certification Statement: The patient will continue to require additional inpatient hospital stay due to need  for diuresis, management of respiratory failure  Discharge Plan: Anticipate discharge in 48-72 hrs to discharge location to be determined pending rehab evaluations.    Code Status: Level 1 - Full Code    Subjective   Patient seen and examined.  He does report intermittent shortness of breath, denies chest pain    Objective :  Temp:  [98 °F (36.7 °C)-98.9 °F (37.2 °C)] 98.9 °F (37.2 °C)  HR:  [65-84] 70  BP: (100-113)/(65-76) 113/68  Resp:  [18] 18  SpO2:  [91 %-100 %] 97 %  O2 Device: Nasal cannula  Nasal Cannula O2 Flow Rate (L/min):  [3 L/min] 3 L/min  FiO2 (%):  [35] 35    Body mass index is 16.09 kg/m².     Input and Output Summary (last 24 hours):     Intake/Output Summary (Last 24 hours) at 3/25/2025 1555  Last data filed at 3/25/2025 1249  Gross per 24 hour   Intake 240 ml   Output 600 ml   Net -360 ml       Physical Exam  Constitutional:       General: He is in acute distress.      Appearance: He is ill-appearing.   HENT:      Head: Normocephalic and atraumatic.   Pulmonary:      Effort: Respiratory distress present.      Breath sounds: No wheezing, rhonchi or rales.      Comments: Diminished breath sounds b/l  Chest:      Chest wall: No tenderness.   Abdominal:      General: Abdomen is flat.      Palpations: Abdomen is soft.      Tenderness: There is no abdominal tenderness.   Musculoskeletal:      Right lower leg: No edema.      Left lower leg: No edema.   Skin:     General: Skin is warm and dry.      Capillary Refill: Capillary refill takes less than 2 seconds.   Neurological:      Mental Status: He is oriented to person, place, and time.           Lines/Drains:  Lines/Drains/Airways       Active Status       Name Placement date Placement time Site Days    External Urinary Catheter 03/21/25  1300  -- 4                      Telemetry:  Telemetry Orders (From admission, onward)               24 Hour Telemetry Monitoring  Continuous x 24 Hours (Telem)        Expiring   Question:  Reason for 24 Hour Telemetry   Answer:  Decompensated CHF- and any one of the following: continuous diuretic infusion or total diuretic dose >200 mg daily, associated electrolyte derangement (I.e. K < 3.0), inotropic drip (continuous infusion), hx of ventricular arrhythmia, or new EF < 35%                                Lab Results: I have reviewed the following results:   Results from last 7 days   Lab Units 03/25/25 0458 03/24/25  0528 03/23/25  0552   WBC Thousand/uL 7.17   < > 6.09   HEMOGLOBIN g/dL 11.4*   < > 11.5*   HEMATOCRIT % 37.4   < > 36.8   PLATELETS Thousands/uL 172   < > 161   SEGS PCT %  --   --  73   LYMPHO PCT %  --   --  14   MONO PCT %  --   --  11   EOS PCT %  --   --  2    < > = values in this interval not displayed.     Results from last 7 days   Lab Units 03/25/25 0458 03/24/25  0523   SODIUM mmol/L 135 137   POTASSIUM mmol/L 4.0 3.1*   CHLORIDE mmol/L 88* 87*   CO2 mmol/L 42* 44*   BUN mg/dL 27* 30*   CREATININE mg/dL 0.64 0.70   ANION GAP mmol/L 5 6   CALCIUM mg/dL 8.6 8.7   ALBUMIN g/dL  --  3.6   TOTAL BILIRUBIN mg/dL  --  0.68   ALK PHOS U/L  --  40   ALT U/L  --  22   AST U/L  --  23   GLUCOSE RANDOM mg/dL 82 116                       Recent Cultures (last 7 days):               Last 24 Hours Medication List:     Current Facility-Administered Medications:     acetaminophen (TYLENOL) tablet 650 mg, Q6H PRN    albuterol inhalation solution 2.5 mg, Q4H PRN    aluminum-magnesium hydroxide-simethicone (MAALOX) oral suspension 30 mL, Q6H PRN    aspirin chewable tablet 81 mg, Daily    budesonide (PULMICORT) inhalation solution 0.5 mg, Q12H    [Transfer Hold] chlorhexidine (PERIDEX) 0.12 % oral rinse 15 mL, Q12H GABE    clotrimazole (LOTRIMIN) 1 % cream, BID    docusate sodium (COLACE) capsule 100 mg, BID    formoterol (PERFOROMIST) nebulizer solution 20 mcg, Q12H    heparin (porcine) subcutaneous injection 5,000 Units, Q8H GABE    ipratropium (ATROVENT) 0.02 % inhalation solution 0.5 mg, TID    levalbuterol (XOPENEX)  inhalation solution 1.25 mg, TID    ondansetron (ZOFRAN) injection 4 mg, Q6H PRN    polyethylene glycol (MIRALAX) packet 17 g, Daily    pravastatin (PRAVACHOL) tablet 80 mg, Daily With Dinner    predniSONE tablet 10 mg, Daily    torsemide (DEMADEX) tablet 40 mg, Daily    Administrative Statements   Today, Patient Was Seen By: Alessia Gauthier MD      **Please Note: This note may have been constructed using a voice recognition system.**

## 2025-03-26 LAB
ANION GAP SERPL CALCULATED.3IONS-SCNC: 5 MMOL/L (ref 4–13)
BASOPHILS # BLD AUTO: 0.01 THOUSANDS/ÂΜL (ref 0–0.1)
BASOPHILS NFR BLD AUTO: 0 % (ref 0–1)
BUN SERPL-MCNC: 28 MG/DL (ref 5–25)
CALCIUM SERPL-MCNC: 8.5 MG/DL (ref 8.4–10.2)
CHLORIDE SERPL-SCNC: 88 MMOL/L (ref 96–108)
CO2 SERPL-SCNC: 44 MMOL/L (ref 21–32)
CREAT SERPL-MCNC: 0.63 MG/DL (ref 0.6–1.3)
EOSINOPHIL # BLD AUTO: 0.03 THOUSAND/ÂΜL (ref 0–0.61)
EOSINOPHIL NFR BLD AUTO: 0 % (ref 0–6)
ERYTHROCYTE [DISTWIDTH] IN BLOOD BY AUTOMATED COUNT: 13 % (ref 11.6–15.1)
GFR SERPL CREATININE-BSD FRML MDRD: 101 ML/MIN/1.73SQ M
GLUCOSE SERPL-MCNC: 217 MG/DL (ref 65–140)
GLUCOSE SERPL-MCNC: 77 MG/DL (ref 65–140)
HCT VFR BLD AUTO: 37.2 % (ref 36.5–49.3)
HGB BLD-MCNC: 11.6 G/DL (ref 12–17)
IMM GRANULOCYTES # BLD AUTO: 0.02 THOUSAND/UL (ref 0–0.2)
IMM GRANULOCYTES NFR BLD AUTO: 0 % (ref 0–2)
LYMPHOCYTES # BLD AUTO: 0.65 THOUSANDS/ÂΜL (ref 0.6–4.47)
LYMPHOCYTES NFR BLD AUTO: 9 % (ref 14–44)
MAGNESIUM SERPL-MCNC: 2.1 MG/DL (ref 1.9–2.7)
MCH RBC QN AUTO: 30.6 PG (ref 26.8–34.3)
MCHC RBC AUTO-ENTMCNC: 31.2 G/DL (ref 31.4–37.4)
MCV RBC AUTO: 98 FL (ref 82–98)
MONOCYTES # BLD AUTO: 0.89 THOUSAND/ÂΜL (ref 0.17–1.22)
MONOCYTES NFR BLD AUTO: 12 % (ref 4–12)
NEUTROPHILS # BLD AUTO: 5.76 THOUSANDS/ÂΜL (ref 1.85–7.62)
NEUTS SEG NFR BLD AUTO: 79 % (ref 43–75)
NRBC BLD AUTO-RTO: 0 /100 WBCS
PLATELET # BLD AUTO: 218 THOUSANDS/UL (ref 149–390)
PMV BLD AUTO: 11.4 FL (ref 8.9–12.7)
POTASSIUM SERPL-SCNC: 4.1 MMOL/L (ref 3.5–5.3)
RBC # BLD AUTO: 3.79 MILLION/UL (ref 3.88–5.62)
SODIUM SERPL-SCNC: 137 MMOL/L (ref 135–147)
WBC # BLD AUTO: 7.36 THOUSAND/UL (ref 4.31–10.16)

## 2025-03-26 PROCEDURE — 94640 AIRWAY INHALATION TREATMENT: CPT

## 2025-03-26 PROCEDURE — 94664 DEMO&/EVAL PT USE INHALER: CPT

## 2025-03-26 PROCEDURE — 99232 SBSQ HOSP IP/OBS MODERATE 35: CPT | Performed by: FAMILY MEDICINE

## 2025-03-26 PROCEDURE — 94660 CPAP INITIATION&MGMT: CPT

## 2025-03-26 PROCEDURE — 97530 THERAPEUTIC ACTIVITIES: CPT

## 2025-03-26 PROCEDURE — 80048 BASIC METABOLIC PNL TOTAL CA: CPT

## 2025-03-26 PROCEDURE — 82948 REAGENT STRIP/BLOOD GLUCOSE: CPT

## 2025-03-26 PROCEDURE — 94760 N-INVAS EAR/PLS OXIMETRY 1: CPT

## 2025-03-26 PROCEDURE — 99232 SBSQ HOSP IP/OBS MODERATE 35: CPT | Performed by: INTERNAL MEDICINE

## 2025-03-26 PROCEDURE — 97116 GAIT TRAINING THERAPY: CPT

## 2025-03-26 PROCEDURE — 85025 COMPLETE CBC W/AUTO DIFF WBC: CPT | Performed by: FAMILY MEDICINE

## 2025-03-26 PROCEDURE — 83735 ASSAY OF MAGNESIUM: CPT | Performed by: FAMILY MEDICINE

## 2025-03-26 RX ORDER — FLUTICASONE PROPIONATE 50 MCG
2 SPRAY, SUSPENSION (ML) NASAL DAILY
Status: DISCONTINUED | OUTPATIENT
Start: 2025-03-26 | End: 2025-04-04 | Stop reason: HOSPADM

## 2025-03-26 RX ADMIN — IPRATROPIUM BROMIDE 0.5 MG: 0.5 SOLUTION RESPIRATORY (INHALATION) at 07:39

## 2025-03-26 RX ADMIN — FORMOTEROL FUMARATE DIHYDRATE 20 MCG: 20 SOLUTION RESPIRATORY (INHALATION) at 07:39

## 2025-03-26 RX ADMIN — POLYETHYLENE GLYCOL 3350 17 G: 17 POWDER, FOR SOLUTION ORAL at 08:51

## 2025-03-26 RX ADMIN — DOCUSATE SODIUM 100 MG: 100 CAPSULE, LIQUID FILLED ORAL at 08:51

## 2025-03-26 RX ADMIN — PREDNISONE 10 MG: 10 TABLET ORAL at 08:50

## 2025-03-26 RX ADMIN — IPRATROPIUM BROMIDE 0.5 MG: 0.5 SOLUTION RESPIRATORY (INHALATION) at 13:24

## 2025-03-26 RX ADMIN — HEPARIN SODIUM 5000 UNITS: 5000 INJECTION INTRAVENOUS; SUBCUTANEOUS at 21:43

## 2025-03-26 RX ADMIN — HEPARIN SODIUM 5000 UNITS: 5000 INJECTION INTRAVENOUS; SUBCUTANEOUS at 05:19

## 2025-03-26 RX ADMIN — BUDESONIDE 0.5 MG: 0.5 INHALANT RESPIRATORY (INHALATION) at 19:56

## 2025-03-26 RX ADMIN — LEVALBUTEROL HYDROCHLORIDE 1.25 MG: 1.25 SOLUTION RESPIRATORY (INHALATION) at 19:56

## 2025-03-26 RX ADMIN — IPRATROPIUM BROMIDE 0.5 MG: 0.5 SOLUTION RESPIRATORY (INHALATION) at 19:56

## 2025-03-26 RX ADMIN — FLUTICASONE PROPIONATE 2 SPRAY: 50 SPRAY, METERED NASAL at 14:29

## 2025-03-26 RX ADMIN — CLOTRIMAZOLE: 1 CREAM TOPICAL at 18:25

## 2025-03-26 RX ADMIN — HEPARIN SODIUM 5000 UNITS: 5000 INJECTION INTRAVENOUS; SUBCUTANEOUS at 14:29

## 2025-03-26 RX ADMIN — TORSEMIDE 40 MG: 20 TABLET ORAL at 08:50

## 2025-03-26 RX ADMIN — BUDESONIDE 0.5 MG: 0.5 INHALANT RESPIRATORY (INHALATION) at 07:39

## 2025-03-26 RX ADMIN — CLOTRIMAZOLE: 1 CREAM TOPICAL at 08:51

## 2025-03-26 RX ADMIN — LEVALBUTEROL HYDROCHLORIDE 1.25 MG: 1.25 SOLUTION RESPIRATORY (INHALATION) at 07:39

## 2025-03-26 RX ADMIN — ASPIRIN 81 MG CHEWABLE TABLET 81 MG: 81 TABLET CHEWABLE at 08:51

## 2025-03-26 RX ADMIN — LEVALBUTEROL HYDROCHLORIDE 1.25 MG: 1.25 SOLUTION RESPIRATORY (INHALATION) at 13:24

## 2025-03-26 NOTE — ASSESSMENT & PLAN NOTE
Acute on Chronic Hypoxic Hypercapnic respiratory failure 2/2 COPD/CHF exacerbation S/P MICU admission for IV diuresis, intermittent steroid dosing. Transferred to medical floor.     Trial high flow / low FiO2 for air hunger today, patient reports mild improvement but too early to tell   Not a candidate for at-home My-Airvo 3 due to nocturnal Trilogy usage  BIPAP 15/6 at night and as needed.  Morphine PRN, C/W Fan therapy  Hold off on re-engaging Palliative Care until patient has tried PT and has had some time on high-flow

## 2025-03-26 NOTE — ASSESSMENT & PLAN NOTE
Malnutrition Findings:   Adult Malnutrition type: Chronic illness  Adult Degree of Malnutrition: Other severe protein calorie malnutrition  Malnutrition Characteristics: Fat loss, Muscle loss                  360 Statement: severe malnutrition r/t chronic illness as evidenced by severe muscle loss around clavicles and shoulders, moderate-severe muscle loss in temples, severe buccal fat pad loss, apparent depression between ribs suggesting severe fat loss. Treatment: oral diet and oral nutrition supplements    BMI Findings:         Recommend Nutrition Consult  Body mass index is 16.05 kg/m².

## 2025-03-26 NOTE — ASSESSMENT & PLAN NOTE
Wt Readings from Last 3 Encounters:   03/26/25 39.8 kg (87 lb 11.9 oz)   02/21/25 44.3 kg (97 lb 11.2 oz)   09/06/24 44.9 kg (99 lb)   Acute decompensated CHF    BNP elevated 1079  2D echo 9/2024 EF 25% systolic function severely reduced,  severe global hypokinesis, grade 1 diastolic dysfunction LA moderately dilated mild TR, no aortic stenosis, trace MR, mild TR mildly elevated RVSP at 42, no pericardial effusion  Continue with Lasix 80 mg daily  Heart failure following appreciate recommendations

## 2025-03-26 NOTE — CASE MANAGEMENT
Case Management Discharge Planning Note    Patient name Natalio Hartmann Jr.  [facility] MRN [id number]  : 1955 Date 3/26/2025       Current Admission Date: 3/14/2025  Current Admission Diagnosis:Acute on chronic respiratory failure with hypoxia and hypercapnia ([facility])   Patient Active Problem List    Diagnosis Date Noted Date Diagnosed    Iron deficiency anemia secondary to inadequate dietary iron intake 2024     SIADH (syndrome of inappropriate ADH production) ([facility]) 2024     Type 2 diabetes mellitus without complication, with long-term current use of insulin ([facility]) 2024     End stage COPD ([facility]) 2024     Dependence on respirator (ventilator) status ([facility]) 01/10/2024     Severe protein-calorie malnutrition ([facility]) 10/14/2023     History of bladder cancer 2023     Pulmonary cachexia due to COPD ([facility])      Chronic hypercapnia/KEVIN 2023     Metabolic encephalopathy 2023     Palliative care patient 2023     Alkalosis 2023     Malnutrition ([facility]) 06/10/2023     Hyperlipidemia 2023     COPD exacerbation ([facility]) 2023     Steroid-induced hyperglycemia 2023     Physical deconditioning 2023     Chronic anemia 2023     SIRS (systemic inflammatory response syndrome) 2023     Hyponatremia 2023     Acute on chronic systolic congestive heart failure ([facility]) 2022     Pulmonary nodules/lesions, multiple 2022     Prostate cancer ([facility]) 2021     BPH with obstruction/lower urinary tract symptoms 2021     Goals of care, counseling/discussion 2021     Acute on chronic respiratory failure with hypoxia and hypercapnia ([facility]) 2020     Macrocytosis without anemia 2019     Other insomnia 2019     GERD (gastroesophageal reflux disease) 2017     Nonobstructive atherosclerosis of coronary artery 2016     Mood disorder ([facility]) 2015     Prediabetes 2015     Osteoporosis  10/14/2014     Vitamin D deficiency 10/14/2014     Nonischemic cardiomyopathy (HCC) 09/26/2014     Left bundle-branch block 08/03/2013     Osteoarthritis 08/03/2013     Obstructive sleep apnea 07/29/2013       LOS (days): 12  Geometric Mean LOS (GMLOS) (days): 4.9  Days to GMLOS:-6.9     OBJECTIVE:  Risk of Unplanned Readmission Score: 23.31         Current admission status: Inpatient   Preferred Pharmacy:   [id number] - [place], [place] - [address]  [place] [address]  Phone: [phone] Fax: [phone]    Primary Care Provider: Fatmata Gustafson DO    Primary Insurance: BLUE CROSS  Secondary Insurance: MEDICARE    DISCHARGE DETAILS:    Late Note 03/25/26: CM spoke to patient's wife at bedside. They want to see how patient progresses before deciding if they want rehab. CM team will continue to follow for discharge planning needs.

## 2025-03-26 NOTE — PROGRESS NOTES
Advanced Heart Failure / Pulmonary Hypertension Service Progress Note    Natalio Hartmann Jr. 69 y.o. male   MRN: [id number]  Unit/Bed#: [facility] 431-01; Encounter: [id number]    Assessment:  Principal Problem:    Acute on chronic respiratory failure with hypoxia and hypercapnia (HCC)  Active Problems:    Obstructive sleep apnea    Goals of care, counseling/discussion    Acute on chronic systolic congestive heart failure (HCC)    SIRS (systemic inflammatory response syndrome)    COPD exacerbation (HCC)    Hyperlipidemia    Malnutrition (HCC)    Metabolic encephalopathy    Severe protein-calorie malnutrition (HCC)      69 y.o. male with a PMH of severe COPD, chronic hypoxic and hypercapnic respiratory failure on 4 L supplemental oxygen, cachexia, nonischemic cardiomyopathy, HFrEF --   who presents to the ER 3-14-25 with worsening shortness of breath,  increasingly lethargic feeling, and fatigue.  On day of admit, he was visibly short of breath slumped in the chair noted by his wife and brought to the ER.   In the ED he was noted to be hypoxic and hypercapnic requiring BiPAP.   BNP on admit>1000 (higher than priors).  CXR showed pulmonary congestion.  Unable to wean off Bipap--admitted to MICU.   Heart failure team consulted.   Transfered to floor 3-22-25.      Subjective:   Patient seen and examined. Wife at bedside. Patient states he wants to go home and is upset when STR is mentioned. Wie understands his weakened state and agrees she can't care for him at home.  Patient requesting to get out of bed to chair.     Objective:   Intake/ Output: 240/ 1500 (-1260)  Weight:  admit 102 lbs--93--93--87--87 bed (same, -10 lbs total)   Telemetry: SR, NSVT    Today's Plan:  Euvolemic on exam. Continue Torsemide 40 mg PO QD today.  Creatinine stable, weights stable  No current GDMT d/t hypotn.  BP's   Appreciate Pulmonology input--S/p IV steroids--now on prednisone, s/p IV abx for PNA  Discussed getting pt OOB with  "Physical Therapy, per wife's request  Declines palliative care/hospice- agree there should be [facility] discussion with family  Repeat BMP in a.m.    Plan:  Acute on Chronic Respiratory Failure with hypoxia and hypercapnia  # COPD exacerbation  -Former smoker; 105 pack years, quit in 2012.   -Multiple hospitalizations with acute COPD exacerbations   -Required BiPAP in the ER--CO2 on admit 140--> now 44  -Multifactorial with COPD and CHF exacerbation contributing (COPD > CHF) +/- PNA  -treatment of COPD exacerbation per pulmonary / primary team  -completed treatment for possible PNA with ceftriaxone x 7 days  -treatment of CHF as below     HFrEF - acute on chronic, LVEF 25%  ETIOLOGY: NICMP per [facility] in 2019.  Does have +coronary calcium on CT scan.  Possible dyssynchrony from chronic LBBB.  Despite QRS only being 120 ms, echo shows significant dyssynchrony.     VOLUME:  - home diuretic:  torsemide 40 mg QD alternating with 20 mg QD  - inpatient diuretic:  On admission IVC dilated on echo and BNP higher than priors.  Has been getting IV lasix-->Torsemide 40 mg QD     GDMT:    (Limited by low BP)  - not currently on GDMT    DEVICE:  - he does not have an ICD.   Severe lung disease with life expectancy < 1 year.     Metabolic Encephalopathy (HCC)  - on admit likely d/t Hypercapnia, hypoxia     Nonobstructive CAD  Has coronary calcium on CT  [facility] done 2019  - on aspirin  - on pravastatin     Hyperlipidemia  - on pravastatin     KEVIN   On BiPAP nightly     Cachexia  -BMI 16     GOC.    -palliative care discussed. He is not interested at this time.  Recommend continued discussions.    Vitals:   Blood pressure 96/62, pulse 66, temperature 97.6 °F (36.4 °C), temperature source Oral, resp. rate 20, height 5' 2\" (1.575 m), weight 39.8 kg (87 lb 11.9 oz), SpO2 98%.    Body mass index is 16.05 kg/m².    I/O last 3 completed shifts:  In: 360 [P.O.:360]  Out: 1700 [Urine:1700]    Wt Readings from Last 10 Encounters:   03/26/25 39.8 kg (87 " lb 11.9 oz)   02/21/25 44.3 kg (97 lb 11.2 oz)   09/06/24 44.9 kg (99 lb)   08/13/24 45.2 kg (99 lb 9.6 oz)   07/29/24 44.7 kg (98 lb 9.6 oz)   04/15/24 44.4 kg (97 lb 12.8 oz)   03/11/24 45 kg (99 lb 4.8 oz)   02/04/24 45.4 kg (100 lb)   01/22/24 46.7 kg (103 lb)   01/10/24 43.5 kg (96 lb)     Vitals:    03/26/25 0210 03/26/25 0245 03/26/25 0545 03/26/25 0726   BP: 95/62   96/62   BP Location:    Right arm   Pulse: 68   66   Resp:    20   Temp: 97.5 °F (36.4 °C)   97.6 °F (36.4 °C)   TempSrc: Oral   Oral   SpO2: 97% 97%  98%   Weight:   39.8 kg (87 lb 11.9 oz)    Height:           Physical Exam  Constitutional:       Appearance: He is cachectic. He is ill-appearing.   Neck:      Vascular: No JVD.   Cardiovascular:      Rate and Rhythm: Normal rate and regular rhythm. Extrasystoles are present.     Heart sounds: S1 normal and S2 normal.   Pulmonary:      Effort: Tachypnea and respiratory distress present.      Breath sounds: Normal air entry. Wheezing present.   Abdominal:      Palpations: Abdomen is soft.   Musculoskeletal:      Right lower leg: No edema.      Left lower leg: No edema.   Skin:     General: Skin is warm and dry.   Neurological:      Mental Status: He is alert and oriented to person, place, and time.   Psychiatric:         Behavior: Behavior is cooperative.         Cognition and Memory: Cognition normal.           Current Facility-Administered Medications:     acetaminophen (TYLENOL) tablet 650 mg, 650 mg, Oral, Q6H PRN, Richelle Martines MD    albuterol inhalation solution 2.5 mg, 2.5 mg, Nebulization, Q4H PRN, Alessia Gauthier MD, 2.5 mg at 03/25/25 0912    aluminum-magnesium hydroxide-simethicone (MAALOX) oral suspension 30 mL, 30 mL, Oral, Q6H PRN, Richelle Martines MD    aspirin chewable tablet 81 mg, 81 mg, Oral, Daily, Richelle Martines MD, 81 mg at 03/25/25 1153    budesonide (PULMICORT) inhalation solution 0.5 mg, 0.5 mg, Nebulization, Q12H, Richelle Martines MD, 0.5 mg at 03/26/25 0739    [Transfer Hold]  chlorhexidine (PERIDEX) 0.12 % oral rinse 15 mL, 15 mL, Mouth/Throat, Q12H GABE, Nguyễn Liu, , 15 mL at 03/22/25 0932    clotrimazole (LOTRIMIN) 1 % cream, , Topical, BID, Alessia Gauthier MD, Given at 03/25/25 1739    docusate sodium (COLACE) capsule 100 mg, 100 mg, Oral, BID, Richelle Martines MD, 100 mg at 03/25/25 1739    formoterol (PERFOROMIST) nebulizer solution 20 mcg, 20 mcg, Nebulization, Q12H, Richelle Martines MD, 20 mcg at 03/26/25 0739    heparin (porcine) subcutaneous injection 5,000 Units, 5,000 Units, Subcutaneous, Q8H [facility], Richelle Martines MD, 5,000 Units at 03/26/25 0519    ipratropium (ATROVENT) 0.02 % inhalation solution 0.5 mg, 0.5 mg, Nebulization, TID, Richelle Martines MD, 0.5 mg at 03/26/25 0739    levalbuterol (XOPENEX) inhalation solution 1.25 mg, 1.25 mg, Nebulization, TID, Richelle Martines MD, 1.25 mg at 03/26/25 0739    ondansetron (ZOFRAN) injection 4 mg, 4 mg, Intravenous, Q6H PRN, Richelle Martines MD    polyethylene glycol (MIRALAX) packet 17 g, 17 g, Oral, Daily, Richelle Martines MD, 17 g at 03/24/25 0855    pravastatin (PRAVACHOL) tablet 80 mg, 80 mg, Oral, Daily With Dinner, Richelle Martines MD, 80 mg at 03/25/25 1604    predniSONE tablet 10 mg, 10 mg, Oral, Daily, Richelle Martines MD, 10 mg at 03/25/25 1153    torsemide (DEMADEX) tablet 40 mg, 40 mg, Oral, Daily, DELIA Dickinson, 40 mg at 03/25/25 1605    Labs & Results:      Results from last 7 days   Lab Units 03/26/25  0546 03/25/25  0458 03/24/25  0528   WBC Thousand/uL 7.36 7.17 5.49   HEMOGLOBIN g/dL 11.6* 11.4* 11.0*   HEMATOCRIT % 37.2 37.4 36.9   PLATELETS Thousands/uL 218 172 164         Results from last 7 days   Lab Units 03/26/25  0546 03/25/25  0458 03/24/25  0523   POTASSIUM mmol/L 4.1 4.0 3.1*   CHLORIDE mmol/L 88* 88* 87*   CO2 mmol/L 44* 42* 44*   BUN mg/dL 28* 27* 30*   CREATININE mg/dL 0.63 0.64 0.70   CALCIUM mg/dL 8.5 8.6 8.7   ALK PHOS U/L  --   --  40   ALT U/L  --   --  22   AST U/L  --   --  23             Thank you  for the opportunity to participate in the care of this patient.    DELIA Betancourt  Advanced Heart Failure  [facility]

## 2025-03-26 NOTE — ASSESSMENT & PLAN NOTE
Malnutrition Findings:   Adult Malnutrition type: Chronic illness  Adult Degree of Malnutrition: Other severe protein calorie malnutrition  Malnutrition Characteristics: Fat loss, Muscle loss                  360 Statement: severe malnutrition r/t chronic illness as evidenced by severe muscle loss around clavicles and shoulders, moderate-severe muscle loss in temples, severe buccal fat pad loss, apparent depression between ribs suggesting severe fat loss. Treatment: oral diet and oral nutrition supplements    BMI Findings:           Body mass index is 16.05 kg/m².

## 2025-03-26 NOTE — PROGRESS NOTES
Progress Note - Hospitalist   Name: Natalio Hartmann Jr. 69 y.o. male I MRN: [id number]  Unit/Bed#: [facility] 431-01 I Date of Admission: 3/14/2025   Date of Service: 3/26/2025 I Hospital Day: 12    Assessment & Plan  Acute on chronic respiratory failure with hypoxia and hypercapnia (HCC)  History of COPD and CHF, baseline O2 needs of 4 L via nasal cannula  Uses trilogy NIV at night and is on prednisone 10 mg daily chronically  Admitted to ICU for IV diuresis and respiratory support with BiPAP, steroids, nebulizers,  improved and tansferred to floor on 3/22  Most likely due to severe COPD  Pulmonary following, continues to need intermittent BiPAP due to dyspnea and will trial high flow/low FiO2  Continue diuresis as per cardiology  Acute on chronic systolic congestive heart failure (HCC)  Wt Readings from Last 3 Encounters:   03/26/25 39.8 kg (87 lb 11.9 oz)   02/21/25 44.3 kg (97 lb 11.2 oz)   09/06/24 44.9 kg (99 lb)   Known history of nonischemic cardiomyopathy with LVEF 25%  Presents with worsening shortness of breath volume overload state noted  Admitted to ICU, volume status was improving and patient deemed appropriate for discharge to the floor on 3/22  transitioned to PO diuretics on 3/25- torsemide 40 mg daily  Follow I's/O, daily weight, and monitor  COPD exacerbation (HCC)  Monitored by pulmonology during hospitalization.  Low concern for acute exacerbation  Continue inhaler regimen and prednisone 10 mg daily  SIRS (systemic inflammatory response syndrome)  SIRS present on admission, patient initially covered for suspected pneumonia. Symptoms felt more likely to be related to CHF, and abx stopped after 4 days  Blood cultures negative  Monitor off antibiotics  Obstructive sleep apnea  BiPAP as needed and at bedtime, patient appears to be tolerating  Metabolic encephalopathy  Secondary to respiratory failure which is multifactorial related to COPD and CHF.  Probable hypoxia and hypercapnia component as well  Status  appears to be improving with nocturnal BIPAP  Continue supportive measures and monitor  Severe protein-calorie malnutrition (HCC)  Malnutrition Findings:     Body mass index is 16.05 kg/m².   Encourage adequate protein and calorie intake  Goals of care, counseling/discussion  Patient with multiple comorbidities and overall guarded prognosis.  Continue Level 1 Full Code at this time as per family wishes  Hyperlipidemia  Resume Pravachol 80mg po qd  Malnutrition (HCC)  Malnutrition Findings:   Adult Malnutrition type: Chronic illness  Adult Degree of Malnutrition: Other severe protein calorie malnutrition  Malnutrition Characteristics: Fat loss, Muscle loss              360 Statement: severe malnutrition r/t chronic illness as evidenced by severe muscle loss around clavicles and shoulders, moderate-severe muscle loss in temples, severe buccal fat pad loss, apparent depression between ribs suggesting severe fat loss. Treatment: oral diet and oral nutrition supplements  BMI Findings:         Body mass index is 16.05 kg/m².     VTE Pharmacologic Prophylaxis: VTE Score: 8 High Risk (Score >/= 5) - Pharmacological DVT Prophylaxis Ordered: heparin. Sequential Compression Devices Ordered.    Mobility:   Basic Mobility Inpatient Raw Score: 14  JH-HLM Goal: 4: Move to chair/commode  JH-HLM Achieved: 7: Walk 25 feet or more  JH-HLM Goal NOT achieved. Continue with multidisciplinary rounding and encourage appropriate mobility to improve upon JH-HLM goals.    Patient Centered Rounds: I performed bedside rounds with nursing staff today.   Discussions with Specialists or Other Care Team Provider: RN; cardiology notes reviewed    Education and Discussions with Family / Patient:  discussed with the patient.     Current Length of Stay: 12 day(s)  Current Patient Status: Inpatient   Certification Statement: The patient will continue to require additional inpatient hospital stay due to need for diuresis, management of respiratory  failure  Discharge Plan: Anticipate discharge in 48-72 hrs to discharge location to be determined pending rehab evaluations.    Code Status: Level 1 - Full Code    Subjective   Patient seen and examined, continues to report shortness of breath, feels better with BiPap on    Objective :  Temp:  [97.5 °F (36.4 °C)-98.5 °F (36.9 °C)] 97.6 °F (36.4 °C)  HR:  [66-78] 76  BP: ()/(62-76) 100/62  Resp:  [17-44] 17  SpO2:  [90 %-99 %] 93 %  O2 Device: BiPAP  Nasal Cannula O2 Flow Rate (L/min):  [3 L/min] 3 L/min  FiO2 (%):  [35] 35    Body mass index is 16.05 kg/m².     Input and Output Summary (last 24 hours):     Intake/Output Summary (Last 24 hours) at 3/26/2025 1554  Last data filed at 3/26/2025 1202  Gross per 24 hour   Intake 0 ml   Output 1700 ml   Net -1700 ml       Physical Exam  Constitutional:       General: He is in acute distress.      Appearance: He is ill-appearing.   HENT:      Head: Normocephalic and atraumatic.   Pulmonary:      Effort: Respiratory distress present.      Breath sounds: No wheezing, rhonchi or rales.      Comments: Diminished breath sounds b/l  Chest:      Chest wall: No tenderness.   Abdominal:      General: Abdomen is flat.      Palpations: Abdomen is soft.      Tenderness: There is no abdominal tenderness.   Musculoskeletal:      Right lower leg: No edema.      Left lower leg: No edema.   Skin:     General: Skin is warm and dry.      Capillary Refill: Capillary refill takes less than 2 seconds.   Neurological:      Mental Status: He is oriented to person, place, and time.           Lines/Drains:  Lines/Drains/Airways       Active Status       Name Placement date Placement time Site Days    External Urinary Catheter 03/21/25  1300  -- 5                      Telemetry:  Telemetry Orders (From admission, onward)               24 Hour Telemetry Monitoring  Continuous x 24 Hours (Telem)        Expiring   Question:  Reason for 24 Hour Telemetry  Answer:  Decompensated CHF- and any one of  the following: continuous diuretic infusion or total diuretic dose >200 mg daily, associated electrolyte derangement (I.e. K < 3.0), inotropic drip (continuous infusion), hx of ventricular arrhythmia, or new EF < 35%                                Lab Results: I have reviewed the following results:   Results from last 7 days   Lab Units 03/26/25  0546   WBC Thousand/uL 7.36   HEMOGLOBIN g/dL 11.6*   HEMATOCRIT % 37.2   PLATELETS Thousands/uL 218   SEGS PCT % 79*   LYMPHO PCT % 9*   MONO PCT % 12   EOS PCT % 0     Results from last 7 days   Lab Units 03/26/25  0546 03/25/25  0458 03/24/25  0523   SODIUM mmol/L 137   < > 137   POTASSIUM mmol/L 4.1   < > 3.1*   CHLORIDE mmol/L 88*   < > 87*   CO2 mmol/L 44*   < > 44*   BUN mg/dL 28*   < > 30*   CREATININE mg/dL 0.63   < > 0.70   ANION GAP mmol/L 5   < > 6   CALCIUM mg/dL 8.5   < > 8.7   ALBUMIN g/dL  --   --  3.6   TOTAL BILIRUBIN mg/dL  --   --  0.68   ALK PHOS U/L  --   --  40   ALT U/L  --   --  22   AST U/L  --   --  23   GLUCOSE RANDOM mg/dL 77   < > 116    < > = values in this interval not displayed.                       Recent Cultures (last 7 days):               Last 24 Hours Medication List:     Current Facility-Administered Medications:     acetaminophen (TYLENOL) tablet 650 mg, Q6H PRN    albuterol inhalation solution 2.5 mg, Q4H PRN    aluminum-magnesium hydroxide-simethicone (MAALOX) oral suspension 30 mL, Q6H PRN    aspirin chewable tablet 81 mg, Daily    budesonide (PULMICORT) inhalation solution 0.5 mg, Q12H    [Transfer Hold] chlorhexidine (PERIDEX) 0.12 % oral rinse 15 mL, Q12H GABE    clotrimazole (LOTRIMIN) 1 % cream, BID    docusate sodium (COLACE) capsule 100 mg, BID    fluticasone (FLONASE) 50 mcg/act nasal spray 2 spray, Daily    formoterol (PERFOROMIST) nebulizer solution 20 mcg, Q12H    heparin (porcine) subcutaneous injection 5,000 Units, Q8H GABE    ipratropium (ATROVENT) 0.02 % inhalation solution 0.5 mg, TID    levalbuterol (XOPENEX)  inhalation solution 1.25 mg, TID    ondansetron (ZOFRAN) injection 4 mg, Q6H PRN    polyethylene glycol (MIRALAX) packet 17 g, Daily    pravastatin (PRAVACHOL) tablet 80 mg, Daily With Dinner    predniSONE tablet 10 mg, Daily    torsemide (DEMADEX) tablet 40 mg, Daily    Administrative Statements   Today, Patient Was Seen By: Alessia Gauthier MD      **Please Note: This note may have been constructed using a voice recognition system.**

## 2025-03-26 NOTE — ASSESSMENT & PLAN NOTE
History of COPD and CHF, baseline O2 needs of 4 L via nasal cannula  Uses trilogy NIV at night and is on prednisone 10 mg daily chronically  Admitted to ICU for IV diuresis and respiratory support with BiPAP, steroids, nebulizers,  improved and tansferred to floor on 3/22  Most likely due to severe COPD  Pulmonary following, continues to need intermittent BiPAP due to dyspnea and will trial high flow/low FiO2  Continue diuresis as per cardiology

## 2025-03-26 NOTE — PLAN OF CARE
Problem: PHYSICAL THERAPY ADULT  Goal: Performs mobility at highest level of function for planned discharge setting.  See evaluation for individualized goals.  Description: Treatment/Interventions: ADL retraining, Functional transfer training, LE strengthening/ROM, Elevations, Therapeutic exercise, Endurance training, Cognitive reorientation, Patient/family training, Equipment eval/education, Gait training, Bed mobility, Spoke to MD, Spoke to nursing, Spoke to case management, OT (Pt was seen in conjunction w/ OT 2* unstable presentation; medical complexity; new precautions/ limitations + limited activity tolerance and regression from baseline functional mobility.)  Equipment Recommended: Walker       See flowsheet documentation for full assessment, interventions and recommendations.  Outcome: Progressing  Note: Prognosis: Fair  Problem List: Decreased strength, Decreased endurance, Impaired balance, Decreased mobility, Decreased coordination, Decreased cognition, Impaired judgement, Decreased safety awareness  Assessment: Patient was received supine in bed . Patient was agreeable to therapy services today. PT session focused on gait and transfers today in order to improve functional mobility and independence. Functional mobility was performed as described above.  Pt required increased encouragement to participate in therapy services today. Pt continues to require increased assist with all functional mobility today. Pt reports SOB with all mobility and requires seated rests to recover. SpO2 dropped to 88% on 6L during ambulation. Upon return to room, pt was seated in bedside recliner and left in care of RN and RT.  Patient will benefit from continued PT services while in hospital in order to address remaining limitations. The patients AM-PAC Basic Mobility Inpatient Short From Raw Score is 14 .  Based on AM-PAC scoring and patient presentation, PT currently recommending Level II (Moderate Resource Intensity).  Please also refer to the recommendation of the Physical Therapist for safe discharge planning.  Barriers to Discharge: Inaccessible home environment, Decreased caregiver support  Barriers to Discharge Comments: [place] home; alone at times  Rehab Resource Intensity Level, PT: II (Moderate Resource Intensity)    See flowsheet documentation for full assessment.

## 2025-03-26 NOTE — PHYSICAL THERAPY NOTE
PHYSICAL THERAPY NOTE          Patient Name: Natalio Hartmann Jr.  Today's Date: 3/26/2025     03/26/25 1328   PT Last Visit   PT Visit Date 03/26/25   Note Type   Note Type Treatment   Pain Assessment   Pain Assessment Tool 0-10   Pain Score No Pain   Restrictions/Precautions   Weight Bearing Precautions Per Order No   Other Precautions Cognitive;Chair Alarm;Bed Alarm;Multiple lines;Telemetry;O2;Fall Risk  (BiPap, 6L O2 when off)   General   Chart Reviewed Yes   Response to Previous Treatment Patient with no complaints from previous session.   Family/Caregiver Present Yes  (spouse)   Cognition   Overall Cognitive Status Impaired   Arousal/Participation Responsive;Cooperative   Attention Attends with cues to redirect   Orientation Level Oriented to person;Oriented to place;Oriented to situation   Memory Decreased recall of precautions;Decreased recall of recent events   Following Commands Follows one step commands without difficulty   Subjective   Subjective pt mostly cooperative throughout therapy session. pt received supine in bed   Bed Mobility   Supine to Sit 4  Minimal assistance   Additional items Assist x 1;Increased time required;Verbal cues;LE management   Transfers   Sit to Stand 3  Moderate assistance   Additional items Assist x 1;Increased time required;Verbal cues   Stand to Sit 3  Moderate assistance   Additional items Assist x 1;Increased time required;Verbal cues   Additional Comments transfers w HHA   Ambulation/Elevation   Gait pattern Improper Weight shift;Narrow CARLY;Forward Flexion;Decreased foot clearance;Shuffling;Inconsistent jonathan;Short stride   Gait Assistance 4  Minimal assist   Additional items Assist x 1;Verbal cues;Tactile cues   Assistive Device Rolling walker   Distance 30'+30'   Stair Management Assistance Not tested   Balance   Static Sitting Fair   Dynamic Sitting Fair -   Static Standing Poor +   Dynamic  Standing Poor +   Ambulatory Poor +   Endurance Deficit   Endurance Deficit Yes   Endurance Deficit Description generalized weakness, decreased exercise tolerance   Activity Tolerance   Activity Tolerance Patient limited by fatigue  (SOB)   Medical Staff Made Aware JORDAN Galease, RT, co-treat due to medical complexity and multiple comorbidities   Nurse Made Aware RN cleared and updated   Assessment   Prognosis Fair   Problem List Decreased strength;Decreased endurance;Impaired balance;Decreased mobility;Decreased coordination;Decreased cognition;Impaired judgement;Decreased safety awareness   Assessment Patient was received supine in bed . Patient was agreeable to therapy services today. PT session focused on gait and transfers today in order to improve functional mobility and independence. Functional mobility was performed as described above.  Pt required increased encouragement to participate in therapy services today. Pt continues to require increased assist with all functional mobility today. Pt reports SOB with all mobility and requires seated rests to recover. SpO2 dropped to 88% on 6L during ambulation. Upon return to room, pt was seated in bedside recliner and left in care of RN and RT.  Patient will benefit from continued PT services while in hospital in order to address remaining limitations. The patients AM-PAC Basic Mobility Inpatient Short From Raw Score is 14 .  Based on AM-PAC scoring and patient presentation, PT currently recommending Level II (Moderate Resource Intensity). Please also refer to the recommendation of the Physical Therapist for safe discharge planning.   Barriers to Discharge Inaccessible home environment;Decreased caregiver support   Goals   Patient Goals to go home   STG Expiration Date 04/03/25   PT Treatment Day 1   Plan   Treatment/Interventions ADL retraining;Functional transfer training;LE strengthening/ROM;Elevations;Therapeutic exercise;Endurance training;Bed  mobility;Gait training;Spoke to nursing;OT   Progress Slow progress, decreased activity tolerance   PT Frequency 3-5x/wk   Discharge Recommendation   Rehab Resource Intensity Level, PT II (Moderate Resource Intensity)   AM-PAC Basic Mobility Inpatient   Turning in Flat Bed Without Bedrails 3   Lying on Back to Sitting on Edge of Flat Bed Without Bedrails 3   Moving Bed to Chair 2   Standing Up From Chair Using Arms 2   Walk in Room 2   Climb 3-5 Stairs With Railing 2   Basic Mobility Inpatient Raw Score 14   Basic Mobility Standardized Score 35.55   [facility] Highest Level Of Mobility   -[facility] Goal 4: Move to chair/commode   -HLM Achieved 7: Walk 25 feet or more   Education   Education Provided Mobility training   Patient Reinforcement needed   End of Consult   Patient Position at End of Consult Bedside chair;Bed/Chair alarm activated;All needs within reach       Ron Grady PT, DPT

## 2025-03-26 NOTE — OCCUPATIONAL THERAPY NOTE
Occupational Therapy Progress Note     Patient Name: Natalio Hartmann Jr.  Today's Date: 3/26/2025  Problem List  Principal Problem:    Acute on chronic respiratory failure with hypoxia and hypercapnia (HCC)  Active Problems:    Obstructive sleep apnea    Goals of care, counseling/discussion    Acute on chronic systolic congestive heart failure (HCC)    SIRS (systemic inflammatory response syndrome)    COPD exacerbation (HCC)    Hyperlipidemia    Malnutrition (HCC)    Metabolic encephalopathy    Severe protein-calorie malnutrition (HCC)        03/26/25 1327   OT Last Visit   OT Visit Date 03/26/25   Note Type   Note Type Treatment   Pain Assessment   Pain Assessment Tool 0-10   Pain Score No Pain   Restrictions/Precautions   Weight Bearing Precautions Per Order No   Other Precautions Cognitive;Chair Alarm;Bed Alarm;Multiple lines;Telemetry;O2;Fall Risk  (6 L O2)   Lifestyle   Autonomy I w/ ADLS, assist IADLS, Mod I w/ transfers and functional mobility PTA   Reciprocal Relationships Pt lives w/ his spouse and son   Service to Others retired; demolition   Intrinsic Gratification building model cars   Bed Mobility   Supine to Sit 4  Minimal assistance   Additional items Assist x 1;Increased time required;Verbal cues;LE management   Transfers   Sit to Stand 3  Moderate assistance   Additional items Assist x 1;Increased time required;Verbal cues   Stand to Sit 3  Moderate assistance   Additional items Assist x 1;Increased time required;Verbal cues   Additional Comments with HHA   Functional Mobility   Functional Mobility 4  Minimal assistance   Additional Comments Pt requires MIN Ax1, rw and seated rest break to ambulate short distances. Pt O2 sats to 88 during ambulation. Pt educated on deep breathing during rest for recovery. RN and RT present, RT placed pt back on BiPAP after session   Additional items Rolling walker   Cognition   Overall Cognitive Status Impaired   Arousal/Participation Alert;Responsive;Cooperative    Attention Attends with cues to redirect   Orientation Level Oriented to person;Oriented to place;Oriented to situation   Memory Decreased recall of precautions;Decreased recall of recent events   Following Commands Follows one step commands without difficulty   Comments Pt agreeable to therapy. Pt requires verbal cues for safety throughout session. Pt with decreased safety awareness and insight to condition.   Activity Tolerance   Activity Tolerance Patient limited by fatigue   Medical Staff Made Aware PT, SPT, RN, and RT   Assessment   Assessment Pt was seen on 3/26/2025 to address ADL retraining, functional transfer training, and activity tolerance/endurance. Pt with active OT orders and activity orders. Pt demonstrating improvements and currently requires MIN A to complete bed mobility and ambulate short household distances with rw and seated rest break. Pt O2 to 88, educated on deep breathing. Pt requires MOD A to complete functional transfers. Pt is limited by decreased ADL status, functional transfers, functional mobility, and activity tolerance. Pt supine in bed at beginning of session and seated in bedside chair at end of session with alarm set and all items within reach. The patient's raw score on the AM-PAC Daily Activity Inpatient Short Form is 14. A raw score of less than 19 suggests the patient may benefit from discharge to post-acute rehabilitation services. Please refer to the recommendation of the Occupational Therapist for safe discharge planning. Recommend Level II moderate intensity OT services at d/c to maximize pt function.   Plan   Treatment Interventions ADL retraining;Functional transfer training;UE strengthening/ROM;Endurance training;Cognitive reorientation;Patient/family training;Equipment evaluation/education;Neuromuscular reeducation;Compensatory technique education;Energy conservation;Continued evaluation;Cardiac education;Activityengagement   Goal Expiration Date 04/03/25   OT  Treatment Day 1   OT Frequency 2-3x/wk   Discharge Recommendation   Rehab Resource Intensity Level, OT II (Moderate Resource Intensity)   AM-PAC Daily Activity Inpatient   Lower Body Dressing 2   Bathing 2   Toileting 2   Upper Body Dressing 2   Grooming 3   Eating 3   Daily Activity Raw Score 14   Daily Activity Standardized Score (Calc for Raw Score >=11) 33.39   AM-PAC Applied Cognition Inpatient   Following a Speech/Presentation 3   Understanding Ordinary Conversation 4   Taking Medications 3   Remembering Where Things Are Placed or Put Away 3   Remembering List of 4-5 Errands 3   Taking Care of Complicated Tasks 2   Applied Cognition Raw Score 18   Applied Cognition Standardized Score 38.07   End of Consult   Education Provided Yes;Family or social support of family present for education by provider   Patient Position at End of Consult Bedside chair;Bed/Chair alarm activated;All needs within reach   Nurse Communication Nurse aware of consult       NOLAN Gusman, OTR/L

## 2025-03-26 NOTE — PLAN OF CARE
Problem: OCCUPATIONAL THERAPY ADULT  Goal: Performs self-care activities at highest level of function for planned discharge setting.  See evaluation for individualized goals.  Description: Treatment Interventions: ADL retraining, Functional transfer training, UE strengthening/ROM, Endurance training, Cognitive reorientation, Patient/family training, Equipment evaluation/education, Compensatory technique education, Continued evaluation, Energy conservation, Activityengagement          See flowsheet documentation for full assessment, interventions and recommendations.   Outcome: Progressing  Note: Limitation: Decreased ADL status, Decreased UE strength, Decreased Safe judgement during ADL, Decreased endurance, Decreased self-care trans, Decreased high-level ADLs  Prognosis: Fair  Assessment: Pt was seen on 3/26/2025 to address ADL retraining, functional transfer training, and activity tolerance/endurance. Pt with active OT orders and activity orders. Pt demonstrating improvements and currently requires MIN A to complete bed mobility and ambulate short household distances with rw and seated rest break. Pt O2 to 88, educated on deep breathing. Pt requires MOD A to complete functional transfers. Pt is limited by decreased ADL status, functional transfers, functional mobility, and activity tolerance. Pt supine in bed at beginning of session and seated in bedside chair at end of session with alarm set and all items within reach. The patient's raw score on the AM-PAC Daily Activity Inpatient Short Form is 14. A raw score of less than 19 suggests the patient may benefit from discharge to post-acute rehabilitation services. Please refer to the recommendation of the Occupational Therapist for safe discharge planning. Recommend Level II moderate intensity OT services at d/c to maximize pt function.     Rehab Resource Intensity Level, OT: II (Moderate Resource Intensity)

## 2025-03-26 NOTE — ASSESSMENT & PLAN NOTE
Patient with multiple comorbidities and overall guarded prognosis.  Continue Level 1 Full Code at this time as per family wishes

## 2025-03-26 NOTE — ASSESSMENT & PLAN NOTE
Malnutrition Findings:   Adult Malnutrition type: Chronic illness  Adult Degree of Malnutrition: Other severe protein calorie malnutrition  Malnutrition Characteristics: Fat loss, Muscle loss     360 Statement: severe malnutrition r/t chronic illness as evidenced by severe muscle loss around clavicles and shoulders, moderate-severe muscle loss in temples, severe buccal fat pad loss, apparent depression between ribs suggesting severe fat loss. Treatment: oral diet and oral nutrition supplements  Body mass index is 16.05 kg/m².

## 2025-03-26 NOTE — ASSESSMENT & PLAN NOTE
Palliative consulted during MICU course  Re-addressed goals of care at bedside with wife 3/26/25 - patient currently still highly focused on getting home, did not engage in conversation regarding possibility of end-of-life.

## 2025-03-26 NOTE — ASSESSMENT & PLAN NOTE
Fully resolved at this point  Baseline FEV1 22%  Baseline O2 requirement 4L with nocutrnal Trilogy  Continue nebulized bronchodilators budesonide, formoterol  Pulmonary toileting  Continue with prednisone 10 mg daily (baseline outpt steroid dosing)

## 2025-03-26 NOTE — ASSESSMENT & PLAN NOTE
Malnutrition Findings:     Body mass index is 16.05 kg/m².   Encourage adequate protein and calorie intake

## 2025-03-26 NOTE — ASSESSMENT & PLAN NOTE
Wt Readings from Last 3 Encounters:   03/26/25 39.8 kg (87 lb 11.9 oz)   02/21/25 44.3 kg (97 lb 11.2 oz)   09/06/24 44.9 kg (99 lb)   Known history of nonischemic cardiomyopathy with LVEF 25%  Presents with worsening shortness of breath volume overload state noted  Admitted to ICU, volume status was improving and patient deemed appropriate for discharge to the floor on 3/22  transitioned to PO diuretics on 3/25- torsemide 40 mg daily  Follow I's/O, daily weight, and monitor

## 2025-03-26 NOTE — PROGRESS NOTES
Progress Note - Pulmonology   Name: Natalio Hartmann Jr. 69 y.o. male I MRN: [id number]  Unit/Bed#: [facility] 431-01 I Date of Admission: 3/14/2025   Date of Service: 3/26/2025 I Hospital Day: 12     Assessment & Plan  Acute on chronic respiratory failure with hypoxia and hypercapnia (HCC)  Acute on Chronic Hypoxic Hypercapnic respiratory failure 2/2 COPD/CHF exacerbation S/P MICU admission for IV diuresis, intermittent steroid dosing. Transferred to medical floor.     Trial high flow / low FiO2 for air hunger today, patient reports mild improvement but too early to tell   Not a candidate for at-home My-Airvo 3 due to nocturnal Trilogy usage  BIPAP 15/6 at night and as needed.  Morphine PRN, C/W Fan therapy  Hold off on re-engaging Palliative Care until patient has tried PT and has had some time on high-flow  COPD exacerbation (HCC)  Fully resolved at this point  Baseline FEV1 22%  Baseline O2 requirement 4L with nocutrnal Trilogy  Continue nebulized bronchodilators budesonide, formoterol  Pulmonary toileting  Continue with prednisone 10 mg daily (baseline outpt steroid dosing)  Obstructive sleep apnea  On Trilogy NIV at night at home  C/W BIPAP as above  Metabolic encephalopathy  Improved likely in setting of acute on chronic hypercapnic respiratory failure  Patient Aox3  Severe protein-calorie malnutrition (HCC)  Malnutrition Findings:   Adult Malnutrition type: Chronic illness  Adult Degree of Malnutrition: Other severe protein calorie malnutrition  Malnutrition Characteristics: Fat loss, Muscle loss     360 Statement: severe malnutrition r/t chronic illness as evidenced by severe muscle loss around clavicles and shoulders, moderate-severe muscle loss in temples, severe buccal fat pad loss, apparent depression between ribs suggesting severe fat loss. Treatment: oral diet and oral nutrition supplements  Body mass index is 16.05 kg/m².   Goals of care, counseling/discussion  Palliative consulted during MICU  course  Re-addressed goals of care at bedside with wife 3/26/25 - patient currently still highly focused on getting home, did not engage in conversation regarding possibility of end-of-life.    24 Hour Events : Remains off/on BIPAP.  Fully alert/oriented, but agitated, wanting to go home, likely without recognizing the severity of his weakness given his mostly bed-bound time the last 11 days.  Subjective : Was very dyspneic (60 BPM) on 3L.  Saturations were fine.  Complained of just feeling like he couldn't get enough air.  Requested flonase for nasal congestion.    Objective :  Temp:  [97.5 °F (36.4 °C)-98.9 °F (37.2 °C)] 97.6 °F (36.4 °C)  HR:  [66-82] 66  BP: ()/(62-76) 96/62  Resp:  [18-20] 20  SpO2:  [93 %-99 %] 98 %  O2 Device: BiPAP  Nasal Cannula O2 Flow Rate (L/min):  [3 L/min] 3 L/min  FiO2 (%):  [35] 35    Physical Exam  Vitals reviewed.   Constitutional:       General: He is in acute distress.      Appearance: He is well-developed. He is ill-appearing.   HENT:      Head: Normocephalic and atraumatic.      Mouth/Throat:      Mouth: Mucous membranes are moist.      Pharynx: Oropharynx is clear.   Eyes:      General: No scleral icterus.     Conjunctiva/sclera: Conjunctivae normal.   Neck:      Thyroid: No thyromegaly.      Vascular: No JVD.   Cardiovascular:      Rate and Rhythm: Regular rhythm. Tachycardia present.      Heart sounds: Normal heart sounds. No murmur heard.     No friction rub. No gallop.   Pulmonary:      Effort: Respiratory distress present.      Breath sounds: Normal breath sounds. No wheezing, rhonchi or rales.      Comments: RR 50s-60s.  Severe intercostal muscle wasting noted.    Musculoskeletal:      Cervical back: Neck supple.      Right lower leg: No edema.      Left lower leg: No edema.   Skin:     General: Skin is warm and dry.      Findings: No rash.   Neurological:      General: No focal deficit present.      Mental Status: He is alert and oriented to person, place, and  time. Mental status is at baseline.         Lab Results: I have reviewed the following results:   .     03/26/25  0546   WBC 7.36   HGB 11.6*   HCT 37.2      SODIUM 137   K 4.1   CL 88*   CO2 44*   BUN 28*   CREATININE 0.63   GLUC 77   MG 2.1     ABG: No new results in last 24 hours.    Imaging Results Review: I personally reviewed the following image studies in PACS and associated radiology reports: chest xray and CT chest. My interpretation of the radiology images/reports is: Agree with interpretations provided..  Other Study Results Review: No additional pertinent studies reviewed.  PFT Results Reviewed: reviewed and interpreted.

## 2025-03-26 NOTE — PLAN OF CARE
Problem: Potential for Falls  Goal: Patient will remain free of falls  Description: INTERVENTIONS:  - Educate patient/family on patient safety including physical limitations  - Instruct patient to call for assistance with activity   - Consult OT/PT to assist with strengthening/mobility   - Keep Call bell within reach  - Keep bed low and locked with side rails adjusted as appropriate  - Keep care items and personal belongings within reach  - Initiate and maintain comfort rounds  - Make Fall Risk Sign visible to staff  - Offer Toileting every 2 Hours, in advance of need  - Initiate/Maintain bed alarm  - Obtain necessary fall risk management equipment:  - Apply yellow socks and bracelet for high fall risk patients  - Consider moving patient to room near nurses station  Outcome: Progressing     Problem: PAIN - ADULT  Goal: Verbalizes/displays adequate comfort level or baseline comfort level  Description: Interventions:  - Encourage patient to monitor pain and request assistance  - Assess pain using appropriate pain scale  - Administer analgesics based on type and severity of pain and evaluate response  - Implement non-pharmacological measures as appropriate and evaluate response  - Consider cultural and social influences on pain and pain management  - Notify physician/advanced practitioner if interventions unsuccessful or patient reports new pain  Outcome: Progressing

## 2025-03-27 ENCOUNTER — PATIENT OUTREACH (OUTPATIENT)
Dept: CARDIOLOGY CLINIC | Facility: CLINIC | Age: 70
End: 2025-03-27

## 2025-03-27 LAB
ANION GAP SERPL CALCULATED.3IONS-SCNC: 5 MMOL/L (ref 4–13)
BASOPHILS # BLD AUTO: 0.01 THOUSANDS/ÂΜL (ref 0–0.1)
BASOPHILS NFR BLD AUTO: 0 % (ref 0–1)
BUN SERPL-MCNC: 30 MG/DL (ref 5–25)
CALCIUM SERPL-MCNC: 8.7 MG/DL (ref 8.4–10.2)
CHLORIDE SERPL-SCNC: 88 MMOL/L (ref 96–108)
CO2 SERPL-SCNC: 44 MMOL/L (ref 21–32)
CREAT SERPL-MCNC: 0.89 MG/DL (ref 0.6–1.3)
EOSINOPHIL # BLD AUTO: 0.04 THOUSAND/ÂΜL (ref 0–0.61)
EOSINOPHIL NFR BLD AUTO: 1 % (ref 0–6)
ERYTHROCYTE [DISTWIDTH] IN BLOOD BY AUTOMATED COUNT: 12.9 % (ref 11.6–15.1)
GFR SERPL CREATININE-BSD FRML MDRD: 88 ML/MIN/1.73SQ M
GLUCOSE SERPL-MCNC: 72 MG/DL (ref 65–140)
HCT VFR BLD AUTO: 36.5 % (ref 36.5–49.3)
HGB BLD-MCNC: 10.9 G/DL (ref 12–17)
IMM GRANULOCYTES # BLD AUTO: 0.03 THOUSAND/UL (ref 0–0.2)
IMM GRANULOCYTES NFR BLD AUTO: 0 % (ref 0–2)
LYMPHOCYTES # BLD AUTO: 0.62 THOUSANDS/ÂΜL (ref 0.6–4.47)
LYMPHOCYTES NFR BLD AUTO: 8 % (ref 14–44)
MCH RBC QN AUTO: 29.9 PG (ref 26.8–34.3)
MCHC RBC AUTO-ENTMCNC: 29.9 G/DL (ref 31.4–37.4)
MCV RBC AUTO: 100 FL (ref 82–98)
MONOCYTES # BLD AUTO: 1.05 THOUSAND/ÂΜL (ref 0.17–1.22)
MONOCYTES NFR BLD AUTO: 13 % (ref 4–12)
NEUTROPHILS # BLD AUTO: 6.18 THOUSANDS/ÂΜL (ref 1.85–7.62)
NEUTS SEG NFR BLD AUTO: 78 % (ref 43–75)
NRBC BLD AUTO-RTO: 0 /100 WBCS
PLATELET # BLD AUTO: 199 THOUSANDS/UL (ref 149–390)
PMV BLD AUTO: 10.8 FL (ref 8.9–12.7)
POTASSIUM SERPL-SCNC: 3.9 MMOL/L (ref 3.5–5.3)
RBC # BLD AUTO: 3.65 MILLION/UL (ref 3.88–5.62)
SODIUM SERPL-SCNC: 137 MMOL/L (ref 135–147)
WBC # BLD AUTO: 7.93 THOUSAND/UL (ref 4.31–10.16)

## 2025-03-27 PROCEDURE — NC001 PR NO CHARGE: Performed by: INTERNAL MEDICINE

## 2025-03-27 PROCEDURE — 80048 BASIC METABOLIC PNL TOTAL CA: CPT | Performed by: FAMILY MEDICINE

## 2025-03-27 PROCEDURE — 99232 SBSQ HOSP IP/OBS MODERATE 35: CPT | Performed by: INTERNAL MEDICINE

## 2025-03-27 PROCEDURE — 97530 THERAPEUTIC ACTIVITIES: CPT

## 2025-03-27 PROCEDURE — 99255 IP/OBS CONSLTJ NEW/EST HI 80: CPT | Performed by: INTERNAL MEDICINE

## 2025-03-27 PROCEDURE — 99232 SBSQ HOSP IP/OBS MODERATE 35: CPT | Performed by: FAMILY MEDICINE

## 2025-03-27 PROCEDURE — 92526 ORAL FUNCTION THERAPY: CPT

## 2025-03-27 PROCEDURE — 94640 AIRWAY INHALATION TREATMENT: CPT

## 2025-03-27 PROCEDURE — 85025 COMPLETE CBC W/AUTO DIFF WBC: CPT | Performed by: FAMILY MEDICINE

## 2025-03-27 PROCEDURE — 94664 DEMO&/EVAL PT USE INHALER: CPT

## 2025-03-27 PROCEDURE — 97112 NEUROMUSCULAR REEDUCATION: CPT

## 2025-03-27 PROCEDURE — 94760 N-INVAS EAR/PLS OXIMETRY 1: CPT

## 2025-03-27 RX ORDER — LEVALBUTEROL INHALATION SOLUTION 1.25 MG/3ML
1.25 SOLUTION RESPIRATORY (INHALATION)
Status: DISCONTINUED | OUTPATIENT
Start: 2025-03-27 | End: 2025-03-27

## 2025-03-27 RX ORDER — LEVALBUTEROL INHALATION SOLUTION 1.25 MG/3ML
1.25 SOLUTION RESPIRATORY (INHALATION)
Status: DISCONTINUED | OUTPATIENT
Start: 2025-03-27 | End: 2025-04-04 | Stop reason: HOSPADM

## 2025-03-27 RX ADMIN — CLOTRIMAZOLE: 1 CREAM TOPICAL at 17:12

## 2025-03-27 RX ADMIN — FLUTICASONE PROPIONATE 2 SPRAY: 50 SPRAY, METERED NASAL at 09:20

## 2025-03-27 RX ADMIN — HEPARIN SODIUM 5000 UNITS: 5000 INJECTION INTRAVENOUS; SUBCUTANEOUS at 15:13

## 2025-03-27 RX ADMIN — ALBUTEROL SULFATE 2.5 MG: 2.5 SOLUTION RESPIRATORY (INHALATION) at 22:40

## 2025-03-27 RX ADMIN — TORSEMIDE 40 MG: 20 TABLET ORAL at 09:19

## 2025-03-27 RX ADMIN — LEVALBUTEROL HYDROCHLORIDE 1.25 MG: 1.25 SOLUTION RESPIRATORY (INHALATION) at 07:40

## 2025-03-27 RX ADMIN — PREDNISONE 10 MG: 10 TABLET ORAL at 09:19

## 2025-03-27 RX ADMIN — PRAVASTATIN SODIUM 80 MG: 80 TABLET ORAL at 17:10

## 2025-03-27 RX ADMIN — FORMOTEROL FUMARATE DIHYDRATE 20 MCG: 20 SOLUTION RESPIRATORY (INHALATION) at 19:23

## 2025-03-27 RX ADMIN — DOCUSATE SODIUM 100 MG: 100 CAPSULE, LIQUID FILLED ORAL at 17:10

## 2025-03-27 RX ADMIN — IPRATROPIUM BROMIDE 0.5 MG: 0.5 SOLUTION RESPIRATORY (INHALATION) at 14:30

## 2025-03-27 RX ADMIN — BUDESONIDE 0.5 MG: 0.5 INHALANT RESPIRATORY (INHALATION) at 19:23

## 2025-03-27 RX ADMIN — HEPARIN SODIUM 5000 UNITS: 5000 INJECTION INTRAVENOUS; SUBCUTANEOUS at 22:10

## 2025-03-27 RX ADMIN — LEVALBUTEROL HYDROCHLORIDE 1.25 MG: 1.25 SOLUTION RESPIRATORY (INHALATION) at 19:23

## 2025-03-27 RX ADMIN — CLOTRIMAZOLE: 1 CREAM TOPICAL at 09:20

## 2025-03-27 RX ADMIN — IPRATROPIUM BROMIDE 0.5 MG: 0.5 SOLUTION RESPIRATORY (INHALATION) at 19:23

## 2025-03-27 RX ADMIN — BUDESONIDE 0.5 MG: 0.5 INHALANT RESPIRATORY (INHALATION) at 07:40

## 2025-03-27 RX ADMIN — FORMOTEROL FUMARATE DIHYDRATE 20 MCG: 20 SOLUTION RESPIRATORY (INHALATION) at 07:40

## 2025-03-27 RX ADMIN — LEVALBUTEROL HYDROCHLORIDE 1.25 MG: 1.25 SOLUTION RESPIRATORY (INHALATION) at 14:30

## 2025-03-27 RX ADMIN — HEPARIN SODIUM 5000 UNITS: 5000 INJECTION INTRAVENOUS; SUBCUTANEOUS at 05:15

## 2025-03-27 RX ADMIN — ASPIRIN 81 MG CHEWABLE TABLET 81 MG: 81 TABLET CHEWABLE at 09:19

## 2025-03-27 RX ADMIN — IPRATROPIUM BROMIDE 0.5 MG: 0.5 SOLUTION RESPIRATORY (INHALATION) at 07:40

## 2025-03-27 RX ADMIN — DOCUSATE SODIUM 100 MG: 100 CAPSULE, LIQUID FILLED ORAL at 09:19

## 2025-03-27 NOTE — SPEECH THERAPY NOTE
"Speech Language/Pathology  Speech-Language Pathology Progress Note      Patient Name: Natalio Hartmann Jr.    Today's Date: 3/27/2025      Subjective:  Pt was awake and alert. He was sitting upright in bed. Patient stated \"Now I like crab cakes \".    Objective:  Pt was seen today for dysphagia therapy. Current diet is regular with thin liquids-chopped meat and veggies though the veggies were whole today. Pt was on high flow O2. Oral care had already been completed. Focus of today's session was to maximize PO intake safety and continue to offer education on pacing and energy conservation . Textures offered today included crab cakes, carrots, mashed potatoes, soda & water by straw.    Swallow function:   Pt was fed by SLP-bolus manipulation and AP transfer were  mildly slow but he is able to take time and breakdown the material.  He is able to fully clear the oral cavity .  Pharyngeal swallow initiation was present. Hyolaryngeal excursion was adequate. He has no overt increase in work of breathing.  The pt stopped the meal and stated \"I don't want to push it, I should take a break but I am still hungry\"  He is able to self pace and will return to the meal when he is ready.  PCA is aware.   Assessment:  Swallow function is stable with current diet. The pt continues to benefit from pacing and conserving his energy.  Today he is able to pace himself.       Plan:  Continue regular and thin liquids. No additional ST f/u needed at this time. Please re consult with any new changes or concerns.   "

## 2025-03-27 NOTE — ASSESSMENT & PLAN NOTE
Acute on Chronic Hypoxic Hypercapnic respiratory failure 2/2 COPD/CHF exacerbation S/P MICU admission for IV diuresis, intermittent steroid dosing. Transferred to medical floor.     Trial high flow / low FiO2 for air hunger   Not a candidate for at-home My-Airvo 3 due to nocturnal Trilogy usage  BIPAP 15/6 at night and as needed.  Morphine PRN, C/W Fan therapy  Palliative care discussion for GOC.

## 2025-03-27 NOTE — PHYSICAL THERAPY NOTE
Physical Therapy Progress Note       03/27/25 1320   PT Last Visit   PT Visit Date 03/27/25   Note Type   Note Type Treatment   Pain Assessment   Pain Assessment Tool 0-10   Pain Score 4   Pain Location/Orientation Orientation: Right;Location: Rib Cage   Hospital Pain Intervention(s) Repositioned;Emotional support   Restrictions/Precautions   Other Precautions Bed Alarm;Chair Alarm;O2;Telemetry;Multiple lines;Pain;Fall Risk   Subjective   Subjective The patient was expressive about his situation, but he was very appreciative of the conversation and help.   Bed Mobility   Supine to Sit 4  Minimal assistance   Additional items Assist x 1;Increased time required;Verbal cues;LE management   Sit to Supine 4  Minimal assistance   Additional items Assist x 1;Increased time required;LE management;Verbal cues   Balance   Static Sitting Fair   Dynamic Sitting Fair -   Activity Tolerance   Activity Tolerance Other (Comment);Patient limited by fatigue  (Dyspnea.)   Exercises   Ankle Pumps Supine;Bilateral;AROM;10 reps   Assessment   Prognosis Fair   Problem List Decreased strength;Decreased endurance;Impaired balance;Decreased mobility;Decreased coordination;Decreased cognition;Impaired judgement;Decreased safety awareness   Assessment Today the patient is limited due to notable dyspnea and air hunger with seated activities. Deferred any out of bed activities due to this and his fatigue. Bed mobility was performed several times to improve his comfort, and he was ultimately positioned in the reclined chair position for lunch. Provided a significant amount of emotional support as well as just general conversation during the session which he repeatedly was very thankful for. He also noted pain from the telemetry box, and positioned this alongside him in the bed instead of the telemetry pocket of the gown. Also positioned the hi-flow tubing to provide him slack, but to also be supported so that he did not have constant tension  which was pulling it off of his face. He was very thankful for all of these interventions. Will continue to follow.   Barriers to Discharge Inaccessible home environment;Decreased caregiver support   Goals   Patient Goals To get better, and to get back home.   STG Expiration Date 04/03/25   PT Treatment Day 2   Plan   Treatment/Interventions Functional transfer training;LE strengthening/ROM;Therapeutic exercise;Endurance training;Patient/family training;Bed mobility;Gait training;Elevations   Progress Slow progress, medical status limitations   PT Frequency 3-5x/wk   Discharge Recommendation   Rehab Resource Intensity Level, PT II (Moderate Resource Intensity)   Equipment Recommended Walker   Walker Package Recommended Wheeled walker   AM-PAC Basic Mobility Inpatient   Turning in Flat Bed Without Bedrails 3   Lying on Back to Sitting on Edge of Flat Bed Without Bedrails 3   Moving Bed to Chair 2   Standing Up From Chair Using Arms 2   Walk in Room 2   Climb 3-5 Stairs With Railing 1   Basic Mobility Inpatient Raw Score 13   Basic Mobility Standardized Score 33.99   [facility] Highest Level Of Mobility   -[facility] Goal 4: Move to chair/commode   -[facility] Achieved 3: Sit at edge of bed         An AM-PAC Basic Mobility raw score less than 16 suggests the patient may benefit from discharge to post-acute rehab services.    Isaias Nixon, PTA

## 2025-03-27 NOTE — CASE MANAGEMENT
Case Management Discharge Planning Note    Patient name Natalio Hartmann Jr.  [facility] [id number]  : 1955 Date 3/27/2025       Current Admission Date: 3/14/2025  Current Admission Diagnosis:Acute on chronic respiratory failure with hypoxia and hypercapnia (HCC)   Patient Active Problem List    Diagnosis Date Noted Date Diagnosed    Iron deficiency anemia secondary to inadequate dietary iron intake 2024     SIADH (syndrome of inappropriate ADH production) ([facility]) 2024     Type 2 diabetes mellitus without complication, with long-term current use of insulin ([facility]) 2024     End stage COPD ([facility]) 2024     Dependence on respirator (ventilator) status ([facility]) 01/10/2024     Severe protein-calorie malnutrition ([facility]) 10/14/2023     History of bladder cancer 2023     Pulmonary cachexia due to COPD ([facility])      Chronic hypercapnia/KEVIN 2023     Metabolic encephalopathy 2023     Palliative care by specialist 2023     Alkalosis 2023     Malnutrition ([facility]) 06/10/2023     Hyperlipidemia 2023     COPD exacerbation ([facility]) 2023     Steroid-induced hyperglycemia 2023     Physical deconditioning 2023     Chronic anemia 2023     SIRS (systemic inflammatory response syndrome) 2023     Hyponatremia 2023     Acute on chronic systolic congestive heart failure ([facility]) 2022     Pulmonary nodules/lesions, multiple 2022     Prostate cancer ([facility]) 2021     BPH with obstruction/lower urinary tract symptoms 2021     Goals of care, counseling/discussion 2021     Acute on chronic respiratory failure with hypoxia and hypercapnia ([facility]) 2020     Macrocytosis without anemia 2019     Other insomnia 2019     GERD (gastroesophageal reflux disease) 2017     Nonobstructive atherosclerosis of coronary artery 2016     Mood disorder ([facility]) 2015     Prediabetes 2015      Osteoporosis 10/14/2014     Vitamin D deficiency 10/14/2014     Nonischemic cardiomyopathy (HCC) 09/26/2014     Left bundle-branch block 08/03/2013     Osteoarthritis 08/03/2013     Obstructive sleep apnea 07/29/2013       LOS (days): 13  Geometric Mean LOS (GMLOS) (days): 4.9  Days to GMLOS:-8.2     OBJECTIVE:  Risk of Unplanned Readmission Score: 22.04         Current admission status: Inpatient   Preferred Pharmacy:   [id number] - [place], [place] - [address] [place] [address]  [place] [address]  Phone: [phone] Fax: [phone]    Primary Care Provider: Fatmata Gustafson DO    Primary Insurance: BLUE CROSS  Secondary Insurance: MEDICARE    DISCHARGE DETAILS:    Per chart review, patient is not medically cleared at this time. CM team will continue to follow for discharge planning needs.

## 2025-03-27 NOTE — PLAN OF CARE
Problem: PHYSICAL THERAPY ADULT  Goal: Performs mobility at highest level of function for planned discharge setting.  See evaluation for individualized goals.  Description: Treatment/Interventions: ADL retraining, Functional transfer training, LE strengthening/ROM, Elevations, Therapeutic exercise, Endurance training, Cognitive reorientation, Patient/family training, Equipment eval/education, Gait training, Bed mobility, Spoke to MD, Spoke to nursing, Spoke to case management, OT (Pt was seen in conjunction w/ OT 2* unstable presentation; medical complexity; new precautions/ limitations + limited activity tolerance and regression from baseline functional mobility.)  Equipment Recommended: Walker       See flowsheet documentation for full assessment, interventions and recommendations.  Outcome: Progressing  Note: Prognosis: Fair  Problem List: Decreased strength, Decreased endurance, Impaired balance, Decreased mobility, Decreased coordination, Decreased cognition, Impaired judgement, Decreased safety awareness  Assessment: Today the patient is limited due to notable dyspnea and air hunger with seated activities. Deferred any out of bed activities due to this and his fatigue. Bed mobility was performed several times to improve his comfort, and he was ultimately positioned in the reclined chair position for lunch. Provided a significant amount of emotional support as well as just general conversation during the session which he repeatedly was very thankful for. He also noted pain from the telemetry box, and positioned this alongside him in the bed instead of the telemetry pocket of the gown. Also positioned the hi-flow tubing to provide him slack, but to also be supported so that he did not have constant tension which was pulling it off of his face. He was very thankful for all of these interventions. Will continue to follow.  Barriers to Discharge: Inaccessible home environment, Decreased caregiver  support  Barriers to Discharge Comments: [place] home; alone at times  Rehab Resource Intensity Level, PT: II (Moderate Resource Intensity)    See flowsheet documentation for full assessment.

## 2025-03-27 NOTE — CONSULTS
Consultation - Palliative Care   Name: Natalio Hartmann Jr. 69 y.o. male I MRN: [id number]  Unit/Bed#: [place] 431-01 I Date of Admission: 3/14/2025   Date of Service: 3/27/2025 I Hospital Day: 13   Consults  Physician Requesting Evaluation: Alessia Gauthier MD   Reason for Evaluation / Principal Problem: goals of care    Assessment & Plan  Acute on chronic respiratory failure with hypoxia and hypercapnia (HCC)    Obstructive sleep apnea    Goals of care, counseling/discussion    Acute on chronic systolic congestive heart failure (HCC)  Wt Readings from Last 3 Encounters:   03/27/25 40.3 kg (88 lb 13.5 oz)   02/21/25 44.3 kg (97 lb 11.2 oz)   09/06/24 44.9 kg (99 lb)             SIRS (systemic inflammatory response syndrome)    COPD exacerbation (HCC)    Hyperlipidemia    Malnutrition (HCC)  Malnutrition Findings:   Adult Malnutrition type: Chronic illness  Adult Degree of Malnutrition: Other severe protein calorie malnutrition  Malnutrition Characteristics: Fat loss, Muscle loss                  360 Statement: severe malnutrition r/t chronic illness as evidenced by severe muscle loss around clavicles and shoulders, moderate-severe muscle loss in temples, severe buccal fat pad loss, apparent depression between ribs suggesting severe fat loss. Treatment: oral diet and oral nutrition supplements    BMI Findings:           Body mass index is 16.25 kg/m².     Metabolic encephalopathy    Severe protein-calorie malnutrition (HCC)  Malnutrition Findings:   Adult Malnutrition type: Chronic illness  Adult Degree of Malnutrition: Other severe protein calorie malnutrition  Malnutrition Characteristics: Fat loss, Muscle loss                  360 Statement: severe malnutrition r/t chronic illness as evidenced by severe muscle loss around clavicles and shoulders, moderate-severe muscle loss in temples, severe buccal fat pad loss, apparent depression between ribs suggesting severe fat loss. Treatment: oral diet and oral nutrition  "supplements    BMI Findings:           Body mass index is 16.25 kg/m².     Palliative care by specialist  Palliative Diagnosis: end stage COPD    Goals:   Patient continues to have competing goals.  He does not find his quality of life in hospital to be acceptable, but also, does not wish to engage in any end of life cares or set any limits on cares.  Explicitly declines hospice.  This is similar to previous conversations our team (including myself) has had during his healthcare journey.  Nevertheless, he was more welcoming of our team than previously and if he continues to allow it I believe he would benefit from ongoing rapport building and Palliative Care support.  After discussion with IM provider, she will broach topic with patient's spouse this afternoon and we will proceed from there.  I also reached out to patient's primary pulmonologist, Dr. Luque, at patient's request, though I expect she has been following his situation closely.  Please do not hesitate to reach out should we be of assistance or should patient's clinical situation change.    Palliative will follow for ongoing goals of care discussions as situation evolves.    Social Support:  Patient's support system: spouse, son  Supportive listening provided  Normalized experience of patient  Advocated for patient/family with interdisciplinary team    Care Coordination  Case discussed with IM provider    Follow-up  We appreciate the opportunity to participate in this patient's care.   We will continue to follow while admitted.    Please do not hesitate to contact our on-call provider through EPIC Secure Chat or contact [phone] should there be an acute change or other symptom control concerns.    Symptom Mgmt:  Returned to talk to Mr. Hartmann about using low dose opioids for breathlessness.  Discusses how \"morphine killed [his] mother\" and he declines to take \"any of those medications.\"   Explored somewhat as able but will respect patient's " decision.    Please do not make any changes to symptom medications (opioid analgesics, nonopioid analgesics, antiemetics, etc) without first consulting the on-call Palliative Care provider for your specific campus; including after hours and on weekends. We are available 24/7, and can be contacted on Epic secure chat or at [phone].            PDMP Review: I have reviewed the patient's controlled substance dispensing history in the Prescription Drug Monitoring Program in compliance with the [facility] regulations before prescribing any controlled substances.    History of Present Illness   HPI: Natalio Hartmann Jr. is a 69 y.o. year old male with end-stage COPD well-known to Palliative Medicine who presents with hypoxic/hypercapneic respiratory failure on 3/14.  Prior to this, patient has not had an admission it appears in over a year, despite the severity of his disease.  Palliative Medicine has frequently engaged in his care, however patient and his spouse have been clear regarding goals and previously declined our involvement, including as recently as last week.  Today however patient agreeable to a visit from me.  We spent a good deal of time discussing his son and how his life has been at home.  He reports his biggest complaint is not being in his home environment with his air conditioning and fans.  He also misses spending time with his dog, a 6 year old yellow lab.  Has been getting regular visits from his family but does not enjoy being hospitalized.  This allowed for a discussion into goals including a care team meeting, and we even were able to discuss hospice. Ultimately his top priority is quantity of life, though he also is very dissatisfied with his quality of life when he is hospitalized.  Open to a care team meeting if his wife is in agreement.   Symptomatically, c/o conversational dyspnea and frustrated with his dietary restrictions.       Medical History Review: I have reviewed the patient's PMH, PSH,  Social History, Family History, Meds, and Allergies     Objective :  Temp:  [97.1 °F (36.2 °C)-98.7 °F (37.1 °C)] 98.7 °F (37.1 °C)  HR:  [61-78] 78  BP: ()/(52-68) 117/68  Resp:  [17-18] 18  SpO2:  [93 %-97 %] 96 %  O2 Device: High flow nasal cannula  FiO2 (%):  [30-35] 30    Physical Exam  Vitals and nursing note reviewed.   Constitutional:       General: He is not in acute distress.     Appearance: He is ill-appearing. He is not toxic-appearing or diaphoretic.   HENT:      Head: Atraumatic.      Right Ear: External ear normal.      Left Ear: External ear normal.      Nose:      Comments: HFNC in place.      Mouth/Throat:      Mouth: Mucous membranes are dry.   Eyes:      General:         Right eye: No discharge.         Left eye: No discharge.   Cardiovascular:      Rate and Rhythm: Normal rate.   Pulmonary:      Comments: Tachypneic and labored with conversation  Abdominal:      General: There is no distension.   Musculoskeletal:         General: Deformity (diffuse muscle wasting, appears frail and cachectic) present. No swelling.   Skin:     General: Skin is warm and dry.      Coloration: Skin is not jaundiced.      Findings: Bruising present.   Neurological:      General: No focal deficit present.      Mental Status: He is alert. Mental status is at baseline.   Psychiatric:         Mood and Affect: Mood normal.         Behavior: Behavior normal.            Lab Results: I have reviewed the following results:  Lab Results   Component Value Date/Time    SODIUM 137 03/27/2025 04:33 AM    K 3.9 03/27/2025 04:33 AM    K 3.9 08/17/2015 07:56 AM    BUN 30 (H) 03/27/2025 04:33 AM    BUN 13 08/17/2015 07:56 AM    CREATININE 0.89 03/27/2025 04:33 AM    CREATININE 1.04 08/17/2015 07:56 AM    GLUC 72 03/27/2025 04:33 AM    CALCIUM 8.7 03/27/2025 04:33 AM    CALCIUM 8.8 08/17/2015 07:56 AM    AST 23 03/24/2025 05:23 AM    AST 16 08/17/2015 07:56 AM    ALT 22 03/24/2025 05:23 AM    ALT 23 08/17/2015 07:56 AM    ALB 3.6  03/24/2025 05:23 AM    ALB 3.9 08/17/2015 07:56 AM    TP 5.9 (L) 03/24/2025 05:23 AM    EGFR 88 03/27/2025 04:33 AM     Lab Results   Component Value Date/Time    HGB 10.9 (L) 03/27/2025 04:33 AM    HGB 14.1 08/17/2015 07:56 AM    WBC 7.93 03/27/2025 04:33 AM    WBC 6.12 08/17/2015 07:56 AM     03/27/2025 04:33 AM     08/17/2015 07:56 AM    INR 0.84 (L) 03/14/2025 11:39 AM    PTT 22 (L) 03/14/2025 11:39 AM     Lab Results   Component Value Date/Time    NPH5SPSZGWPP 0.534 03/16/2025 05:09 AM    MKA7CLUMCHNL 2.708 08/17/2015 07:56 AM       Imaging Results Review: I reviewed radiology reports from this admission including: chest xray and CT chest.  Other Study Results Review: Other studies reviewed include: previous PFTs    Code Status: Level 1 - Full Code  Advance Directive and Living Will:      Power of :    POLST:      Administrative Statements   I have spent a total time of 58 minutes in caring for this patient on the day of the visit/encounter including Diagnostic results, Prognosis, Risks and benefits of tx options, Instructions for management, Patient and family education, Importance of tx compliance, Risk factor reductions, Impressions, and Counseling / Coordination of care.goals of care, code status, hospice discussion, supportive listening.

## 2025-03-27 NOTE — PROGRESS NOTES
Patient listed on Advanced Heart Failure Census at [facility]. AMB Social Work Order has been entered for the purpose of chart review and rounds with the team.     Outpatient Advanced Heart Failure LCSW completed electronic chart review.   HF Team met with patient. He expressed his wishes to return home and HF Team discussed that he's currently on 30L O2 which cannot be done at home. Team discussed going home with comfort measures. Pt stated that he wants to sit at his kitchen table again and see his wife. He also spoke lovingly of his 17 yo son and wants to take a car ride with him. Palliative Care has been asked to revisit GOC with patient and spouse again.   Appreciate Palliative Support.     LCSW scheduled to round again with HF team next week.

## 2025-03-27 NOTE — ASSESSMENT & PLAN NOTE
"Palliative Diagnosis: end stage COPD    Goals:   Patient continues to have competing goals.  He does not find his quality of life in hospital to be acceptable, but also, does not wish to engage in any end of life cares or set any limits on cares.  Explicitly declines hospice.  This is similar to previous conversations our team (including myself) has had during his healthcare journey.  Nevertheless, he was more welcoming of our team than previously and if he continues to allow it I believe he would benefit from ongoing rapport building and Palliative Care support.  After discussion with IM provider, she will broach topic with patient's spouse this afternoon and we will proceed from there.  I also reached out to patient's primary pulmonologist, Dr. Luque, at patient's request, though I expect she has been following his situation closely.  Please do not hesitate to reach out should we be of assistance or should patient's clinical situation change.    Palliative will follow for ongoing goals of care discussions as situation evolves.    Social Support:  Patient's support system: spouse, son  Supportive listening provided  Normalized experience of patient  Advocated for patient/family with interdisciplinary team    Care Coordination  Case discussed with IM provider    Follow-up  We appreciate the opportunity to participate in this patient's care.   We will continue to follow while admitted.    Please do not hesitate to contact our on-call provider through EPIC Secure Chat or contact [phone] should there be an acute change or other symptom control concerns.    Symptom Mgmt:  Returned to talk to Mr. Hartmann about using low dose opioids for breathlessness.  Discusses how \"morphine killed [his] mother\" and he declines to take \"any of those medications.\"   Explored somewhat as able but will respect patient's decision.    Please do not make any changes to symptom medications (opioid analgesics, nonopioid analgesics, " antiemetics, etc) without first consulting the on-call Palliative Care provider for your specific campus; including after hours and on weekends. We are available 24/7, and can be contacted on Epic secure chat or at [phone].

## 2025-03-27 NOTE — ASSESSMENT & PLAN NOTE
Wt Readings from Last 3 Encounters:   03/27/25 40.3 kg (88 lb 13.5 oz)   02/21/25 44.3 kg (97 lb 11.2 oz)   09/06/24 44.9 kg (99 lb)   Known history of nonischemic cardiomyopathy with LVEF 25%  Presents with worsening shortness of breath volume overload state noted  Admitted to ICU, volume status was improving and patient deemed appropriate for discharge to the floor on 3/22  transitioned to PO diuretics on 3/25- torsemide 40 mg daily  Follow I's/O, daily weight, and monitor

## 2025-03-27 NOTE — PROGRESS NOTES
Progress Note - Hospitalist   Name: Natalio Hartmann Jr. 69 y.o. male I MRN: [id number]  Unit/Bed#: [facility] 431-01 I Date of Admission: 3/14/2025   Date of Service: 3/27/2025 I Hospital Day: 13    Assessment & Plan  Acute on chronic respiratory failure with hypoxia and hypercapnia (HCC)  History of COPD and CHF, baseline O2 needs of 4 L via nasal cannula  Uses trilogy NIV at night and is on prednisone 10 mg daily chronically  Admitted to ICU for IV diuresis and respiratory support with BiPAP, steroids, nebulizers,  improved and tansferred to floor on 3/22  Most likely due to severe COPD  Pulmonology following  Pulmonary following, continues to need intermittent BiPAP versus high flow nasal cannula due to dyspnea   Plan for family meeting tomorrow at 2 PM with palliative care, cardiology, pulmonology and patient's wife for goals of care discussion  Acute on chronic systolic congestive heart failure (HCC)  Wt Readings from Last 3 Encounters:   03/27/25 40.3 kg (88 lb 13.5 oz)   02/21/25 44.3 kg (97 lb 11.2 oz)   09/06/24 44.9 kg (99 lb)   Known history of nonischemic cardiomyopathy with LVEF 25%  Presents with worsening shortness of breath volume overload state noted  Admitted to ICU, volume status was improving and patient deemed appropriate for discharge to the floor on 3/22  transitioned to PO diuretics on 3/25- torsemide 40 mg daily  Follow I's/O, daily weight, and monitor  COPD exacerbation (HCC)  Monitored by pulmonology during hospitalization.  Low concern for acute exacerbation  Continue inhaler regimen and prednisone 10 mg daily  SIRS (systemic inflammatory response syndrome)  SIRS present on admission, patient initially covered for suspected pneumonia. Symptoms felt more likely to be related to CHF, and abx stopped after 4 days  Blood cultures negative  Monitor off antibiotics  Obstructive sleep apnea  BiPAP as needed and at bedtime, patient appears to be tolerating  Metabolic encephalopathy  Improved  Secondary to  respiratory failure which is multifactorial related to COPD and CHF.  Probable hypoxia and hypercapnia component as well  Status appears to be improving with nocturnal BIPAP  Continue supportive measures and monitor  Severe protein-calorie malnutrition (HCC)  Malnutrition Findings:     Body mass index is 16.25 kg/m².   Encourage adequate protein and calorie intake  Goals of care, counseling/discussion  Patient with multiple comorbidities and overall guarded prognosis.  Continue Level 1 Full Code at this time as per family wishes  Hyperlipidemia  Resume Pravachol 80mg po qd  Malnutrition (HCC)  Malnutrition Findings:   Adult Malnutrition type: Chronic illness  Adult Degree of Malnutrition: Other severe protein calorie malnutrition  Malnutrition Characteristics: Fat loss, Muscle loss              360 Statement: severe malnutrition r/t chronic illness as evidenced by severe muscle loss around clavicles and shoulders, moderate-severe muscle loss in temples, severe buccal fat pad loss, apparent depression between ribs suggesting severe fat loss. Treatment: oral diet and oral nutrition supplements  BMI Findings:         Body mass index is 16.25 kg/m².   Palliative care by specialist      VTE Pharmacologic Prophylaxis: VTE Score: 8 High Risk (Score >/= 5) - Pharmacological DVT Prophylaxis Ordered: heparin. Sequential Compression Devices Ordered.    Mobility:   Basic Mobility Inpatient Raw Score: 13  JH-HLM Goal: 4: Move to chair/commode  JH-HLM Achieved: 2: Bed activities/Dependent transfer  JH-HLM Goal NOT achieved. Continue with multidisciplinary rounding and encourage appropriate mobility to improve upon JH-HLM goals.    Patient Centered Rounds: I performed bedside rounds with nursing staff today.   Discussions with Specialists or Other Care Team Provider: RN; cardiology notes reviewed    Education and Discussions with Family / Patient:  discussed with the patient and wife at bedside.     Current Length of Stay: 13  day(s)  Current Patient Status: Inpatient   Certification Statement: The patient will continue to require additional inpatient hospital stay due to need for diuresis, management of respiratory failure  Discharge Plan:pending discussion of goals of care, family meeting planned for 3/28    Code Status: Level 1 - Full Code    Subjective   Patient seen and examined, continues to report shortness of breath.  Wife was at bedside.  They both are concerned about patient's condition and open to have a care team meeting to discuss goals of care and next steps.    Objective :  Temp:  [97.1 °F (36.2 °C)-99.2 °F (37.3 °C)] 99.2 °F (37.3 °C)  HR:  [61-83] 83  BP: ()/(52-68) 112/67  Resp:  [17-18] 18  SpO2:  [95 %-97 %] 97 %  O2 Device: High flow nasal cannula  FiO2 (%):  [30-35] 30    Body mass index is 16.25 kg/m².     Input and Output Summary (last 24 hours):     Intake/Output Summary (Last 24 hours) at 3/27/2025 1725  Last data filed at 3/27/2025 1401  Gross per 24 hour   Intake 550 ml   Output 1450 ml   Net -900 ml       Physical Exam  Constitutional:       General: He is in acute distress.      Appearance: He is ill-appearing.   HENT:      Head: Normocephalic and atraumatic.   Pulmonary:      Effort: Respiratory distress present.      Breath sounds: No wheezing, rhonchi or rales.      Comments: Diminished breath sounds b/l  Chest:      Chest wall: No tenderness.   Abdominal:      General: Abdomen is flat.      Palpations: Abdomen is soft.      Tenderness: There is no abdominal tenderness.   Musculoskeletal:      Right lower leg: No edema.      Left lower leg: No edema.   Skin:     General: Skin is warm and dry.      Capillary Refill: Capillary refill takes less than 2 seconds.   Neurological:      Mental Status: He is oriented to person, place, and time.           Lines/Drains:  Lines/Drains/Airways       Active Status       Name Placement date Placement time Site Days    External Urinary Catheter 03/21/25  1300  -- 6                       Telemetry:  Telemetry Orders (From admission, onward)               24 Hour Telemetry Monitoring  Continuous x 24 Hours (Telem)        Expiring   Question:  Reason for 24 Hour Telemetry  Answer:  Decompensated CHF- and any one of the following: continuous diuretic infusion or total diuretic dose >200 mg daily, associated electrolyte derangement (I.e. K < 3.0), inotropic drip (continuous infusion), hx of ventricular arrhythmia, or new EF < 35%                                Lab Results: I have reviewed the following results:   Results from last 7 days   Lab Units 03/27/25  0433   WBC Thousand/uL 7.93   HEMOGLOBIN g/dL 10.9*   HEMATOCRIT % 36.5   PLATELETS Thousands/uL 199   SEGS PCT % 78*   LYMPHO PCT % 8*   MONO PCT % 13*   EOS PCT % 1     Results from last 7 days   Lab Units 03/27/25  0433 03/25/25  0458 03/24/25  0523   SODIUM mmol/L 137   < > 137   POTASSIUM mmol/L 3.9   < > 3.1*   CHLORIDE mmol/L 88*   < > 87*   CO2 mmol/L 44*   < > 44*   BUN mg/dL 30*   < > 30*   CREATININE mg/dL 0.89   < > 0.70   ANION GAP mmol/L 5   < > 6   CALCIUM mg/dL 8.7   < > 8.7   ALBUMIN g/dL  --   --  3.6   TOTAL BILIRUBIN mg/dL  --   --  0.68   ALK PHOS U/L  --   --  40   ALT U/L  --   --  22   AST U/L  --   --  23   GLUCOSE RANDOM mg/dL 72   < > 116    < > = values in this interval not displayed.         Results from last 7 days   Lab Units 03/26/25  1703   POC GLUCOSE mg/dl 217*               Recent Cultures (last 7 days):               Last 24 Hours Medication List:     Current Facility-Administered Medications:     acetaminophen (TYLENOL) tablet 650 mg, Q6H PRN    albuterol inhalation solution 2.5 mg, Q4H PRN    aluminum-magnesium hydroxide-simethicone (MAALOX) oral suspension 30 mL, Q6H PRN    aspirin chewable tablet 81 mg, Daily    budesonide (PULMICORT) inhalation solution 0.5 mg, Q12H    [Transfer Hold] chlorhexidine (PERIDEX) 0.12 % oral rinse 15 mL, Q12H GABE    clotrimazole (LOTRIMIN) 1 % cream, BID     docusate sodium (COLACE) capsule 100 mg, BID    fluticasone (FLONASE) 50 mcg/act nasal spray 2 spray, Daily    formoterol (PERFOROMIST) nebulizer solution 20 mcg, Q12H    heparin (porcine) subcutaneous injection 5,000 Units, Q8H GABE    ipratropium (ATROVENT) 0.02 % inhalation solution 0.5 mg, Q6H    levalbuterol (XOPENEX) inhalation solution 1.25 mg, Q6H    ondansetron (ZOFRAN) injection 4 mg, Q6H PRN    polyethylene glycol (MIRALAX) packet 17 g, Daily    pravastatin (PRAVACHOL) tablet 80 mg, Daily With Dinner    predniSONE tablet 10 mg, Daily    torsemide (DEMADEX) tablet 40 mg, Daily    Administrative Statements   Today, Patient Was Seen By: Alessia Gauthier MD      **Please Note: This note may have been constructed using a voice recognition system.**

## 2025-03-27 NOTE — ASSESSMENT & PLAN NOTE
Malnutrition Findings:   Adult Malnutrition type: Chronic illness  Adult Degree of Malnutrition: Other severe protein calorie malnutrition  Malnutrition Characteristics: Fat loss, Muscle loss              360 Statement: severe malnutrition r/t chronic illness as evidenced by severe muscle loss around clavicles and shoulders, moderate-severe muscle loss in temples, severe buccal fat pad loss, apparent depression between ribs suggesting severe fat loss. Treatment: oral diet and oral nutrition supplements  BMI Findings:         Body mass index is 16.25 kg/m².

## 2025-03-27 NOTE — PROGRESS NOTES
Progress Note - Pulmonology   Name: Natalio Hartmann Jr. 69 y.o. male I MRN: [id number]  Unit/Bed#: [facility] 431-01 I Date of Admission: 3/14/2025   Date of Service: 3/27/2025 I Hospital Day: 13    Assessment & Plan  Acute on chronic respiratory failure with hypoxia and hypercapnia (HCC)  Acute on Chronic Hypoxic Hypercapnic respiratory failure 2/2 COPD/CHF exacerbation S/P MICU admission for IV diuresis, intermittent steroid dosing. Transferred to medical floor.     Trial high flow / low FiO2 for air hunger   Not a candidate for at-home My-Airvo 3 due to nocturnal Trilogy usage  BIPAP 15/6 at night and as needed.  Morphine PRN, C/W Fan therapy  Palliative care discussion for GOC.   COPD exacerbation (HCC)  Fully resolved at this point  Baseline FEV1 22%  Baseline O2 requirement 4L with nocutrnal Trilogy  Continue nebulized bronchodilators budesonide, formoterol  Pulmonary toileting  Continue with prednisone 10 mg daily (baseline outpt steroid dosing)  Obstructive sleep apnea  On Trilogy NIV at night at home  C/W BIPAP as above  Metabolic encephalopathy  Improved likely in setting of acute on chronic hypercapnic respiratory failure  Patient Aox3  Severe protein-calorie malnutrition (HCC)  Malnutrition Findings:   Adult Malnutrition type: Chronic illness  Adult Degree of Malnutrition: Other severe protein calorie malnutrition  Malnutrition Characteristics: Fat loss, Muscle loss     360 Statement: severe malnutrition r/t chronic illness as evidenced by severe muscle loss around clavicles and shoulders, moderate-severe muscle loss in temples, severe buccal fat pad loss, apparent depression between ribs suggesting severe fat loss. Treatment: oral diet and oral nutrition supplements  Body mass index is 16.25 kg/m².   Goals of care, counseling/discussion  Palliative consulted during MICU course  Readdressed goals of care today while at bedside with patient's wife and patient. States that he wants to go home because he would be  "more comfortable in regards to the temperature (too hot in room trung) and being able to \"do more than he's doing now in bed\".  Ultimately he stated that he wants to continue current management and will continue working with physical therapy to get stronger.    Recommendations  - Monitor oxygen & supplement as needed for goal above 88%   - High flow alternating with BiPAP  - Fan therapy, consider PRN Morphine for dyspnea  - Continue with torsemide per primary team   - Continue nebulized bronchodilators budesonide, formoterol, Xopenex Atrovent nebs will increase frequency to 4 times daily  - Pulmonary toileting  - Continue with prednisone 10 mg daily (baseline steroid dosing)  - PT OT, mobility as tolerated  - Ongoing palliative care discussions    24 Hour Events : No acute events over past 24 hours.   Subjective : Seen and examined at bedside.  Met with patient's wife. Patient states that breathing is on and off.  Denies chest pain, palpitations    Objective :  Temp:  [97.1 °F (36.2 °C)-98.5 °F (36.9 °C)] 97.9 °F (36.6 °C)  HR:  [61-76] 67  BP: ()/(52-66) 110/66  Resp:  [17-44] 17  SpO2:  [90 %-97 %] 96 %  O2 Device: BiPAP  FiO2 (%):  [35] 35    Physical Exam  Constitutional:       General: He is in acute distress.      Appearance: He is ill-appearing.   HENT:      Head: Normocephalic and atraumatic.      Mouth/Throat:      Mouth: Mucous membranes are dry.   Pulmonary:      Effort: Respiratory distress present.      Breath sounds: No wheezing, rhonchi or rales.      Comments: Breath sounds distant  Chest:      Chest wall: No tenderness.   Abdominal:      General: Abdomen is flat. There is no distension.      Palpations: Abdomen is soft.   Musculoskeletal:      Right lower leg: No edema.      Left lower leg: No edema.   Skin:     General: Skin is warm and dry.      Capillary Refill: Capillary refill takes less than 2 seconds.   Neurological:      Mental Status: He is oriented to person, place, and time. "   Psychiatric:         Mood and Affect: Mood normal.           Lab Results: I have reviewed the following results:   .     03/27/25  0433   WBC 7.93   HGB 10.9*   HCT 36.5      SODIUM 137   K 3.9   CL 88*   CO2 44*   BUN 30*   CREATININE 0.89   GLUC 72     ABG: No new results in last 24 hours.    Imaging Results Review: I personally reviewed the following image studies/reports in PACS and discussed pertinent findings with Radiology: chest xray. My interpretation of the radiology images/reports is:  . 3/14: CXR:significant for bilateral infiltrates concerning for pulmonary vascular congestion   Other Study Results Review: No additional pertinent studies reviewed.  PFT Results Reviewed: reviewed

## 2025-03-27 NOTE — PLAN OF CARE
Problem: INFECTION - ADULT  Goal: Absence or prevention of progression during hospitalization  Description: INTERVENTIONS:  - Assess and monitor for signs and symptoms of infection  - Monitor lab/diagnostic results  - Monitor all insertion sites, i.e. indwelling lines, tubes, and drains  - Monitor endotracheal if appropriate and nasal secretions for changes in amount and color  - [place] appropriate cooling/warming therapies per order  - Administer medications as ordered  - Instruct and encourage patient and family to use good hand hygiene technique  - Identify and instruct in appropriate isolation precautions for identified infection/condition  Outcome: Progressing     Problem: INFECTION - ADULT  Goal: Absence of fever/infection during neutropenic period  Description: INTERVENTIONS:  - Monitor WBC    Outcome: Progressing     Problem: Knowledge Deficit  Goal: Patient/family/caregiver demonstrates understanding of disease process, treatment plan, medications, and discharge instructions  Description: Complete learning assessment and assess knowledge base.  Interventions:  - Provide teaching at level of understanding  - Provide teaching via preferred learning methods  Outcome: Progressing     Problem: Nutrition/Hydration-ADULT  Goal: Nutrient/Hydration intake appropriate for improving, restoring or maintaining nutritional needs  Description: Monitor and assess patient's nutrition/hydration status for malnutrition. Collaborate with interdisciplinary team and initiate plan and interventions as ordered.  Monitor patient's weight and dietary intake as ordered or per policy. Utilize nutrition screening tool and intervene as necessary. Determine patient's food preferences and provide high-protein, high-caloric foods as appropriate.     INTERVENTIONS:  - Monitor oral intake, urinary output, labs, and treatment plans  - Assess nutrition and hydration status and recommend course of action  - Evaluate amount of meals  eaten  - Assist patient with eating if necessary   - Allow adequate time for meals  - Recommend/ encourage appropriate diets, oral nutritional supplements, and vitamin/mineral supplements  - Order, calculate, and assess calorie counts as needed  - Recommend, monitor, and adjust tube feedings and TPN/PPN based on assessed needs  - Assess need for intravenous fluids  - Provide specific nutrition/hydration education as appropriate  - Include patient/family/caregiver in decisions related to nutrition  Outcome: Progressing

## 2025-03-27 NOTE — ASSESSMENT & PLAN NOTE
Malnutrition Findings:   Adult Malnutrition type: Chronic illness  Adult Degree of Malnutrition: Other severe protein calorie malnutrition  Malnutrition Characteristics: Fat loss, Muscle loss     360 Statement: severe malnutrition r/t chronic illness as evidenced by severe muscle loss around clavicles and shoulders, moderate-severe muscle loss in temples, severe buccal fat pad loss, apparent depression between ribs suggesting severe fat loss. Treatment: oral diet and oral nutrition supplements  Body mass index is 16.25 kg/m².

## 2025-03-27 NOTE — ASSESSMENT & PLAN NOTE
Improved  Secondary to respiratory failure which is multifactorial related to COPD and CHF.  Probable hypoxia and hypercapnia component as well  Status appears to be improving with nocturnal BIPAP  Continue supportive measures and monitor

## 2025-03-27 NOTE — ASSESSMENT & PLAN NOTE
Malnutrition Findings:     Body mass index is 16.25 kg/m².   Encourage adequate protein and calorie intake

## 2025-03-27 NOTE — ASSESSMENT & PLAN NOTE
"Palliative consulted during MICU course  Readdressed goals of care today while at bedside with patient's wife and patient. States that he wants to go home because he would be more comfortable in regards to the temperature (too hot in room trung) and being able to \"do more than he's doing now in bed\".  Ultimately he stated that he wants to continue current management and will continue working with physical therapy to get stronger.  "

## 2025-03-27 NOTE — ASSESSMENT & PLAN NOTE
History of COPD and CHF, baseline O2 needs of 4 L via nasal cannula  Uses trilogy NIV at night and is on prednisone 10 mg daily chronically  Admitted to ICU for IV diuresis and respiratory support with BiPAP, steroids, nebulizers,  improved and tansferred to floor on 3/22  Most likely due to severe COPD  Pulmonology following  Pulmonary following, continues to need intermittent BiPAP versus high flow nasal cannula due to dyspnea   Plan for family meeting tomorrow at 2 PM with palliative care, cardiology, pulmonology and patient's wife for goals of care discussion

## 2025-03-27 NOTE — PROGRESS NOTES
"          Advanced Heart Failure / Pulmonary Hypertension Service Progress Note    Natalio Hartmann Jr. 69 y.o. male   MRN: [id number]  Unit/Bed#: [facility] 431-01; Encounter: [id number]    Assessment:  Principal Problem:    Acute on chronic respiratory failure with hypoxia and hypercapnia (HCC)  Active Problems:    Obstructive sleep apnea    Goals of care, counseling/discussion    Acute on chronic systolic congestive heart failure (HCC)    SIRS (systemic inflammatory response syndrome)    COPD exacerbation (HCC)    Hyperlipidemia    Malnutrition (HCC)    Metabolic encephalopathy    Severe protein-calorie malnutrition (HCC)    69 y.o. male with a PMH of severe COPD, chronic hypoxic and hypercapnic respiratory failure on 4 L supplemental oxygen, cachexia, nonischemic cardiomyopathy, HFrEF -- who presents to the ER 3-14-25 with worsening shortness of breath, increasingly lethargic feeling, and fatigue. On day of admit, he was visibly short of breath slumped in the chair noted by his wife and brought to the ER. In the ED he was noted to be hypoxic and hypercapnic requiring BiPAP. BNP on admit>1000 (higher than priors). CXR showed pulmonary congestion. Unable to wean off Bipap--admitted to MICU. Heart failure team consulted. Transfered to floor 3-22-25.     Subjective:   Patient seen and examined. Although patient has poor insight, he does express that he just wants to go home and spend time with family.  \"My breathing issues will never get better and I would rather be at home than tied to this hospital bed\"    Objective:   Intake/ Output: 310/ 1650 (-1.3)  Weight:  admit 102 lbs--93--93--87--87--88 bed (+1 , -14 lbs total)   Telemetry: SR HR 70's    Today's Plan:  Euvolemic on exam. Continue Torsemide 40 mg PO QD today.  Creatinine stable, weights stable  No current GDMT d/t hypotn.  BP's   Appreciate Pulmonology input--S/p IV steroids--now on prednisone, s/p IV abx for PNA  Discussed getting pt OOB with Physical Therapy, " "per wife's request  Due to patient's worsening hypoxia, increased O2 need, will discuss Palliative consult and need for GOC discussion in hopes patient is agreeable to comfort measures.    Repeat BMP in a.m.    Plan:  Acute on Chronic Respiratory Failure with hypoxia and hypercapnia  # COPD exacerbation  -Former smoker; 105 pack years, quit in 2012.   -Multiple hospitalizations with acute COPD exacerbations   -Required BiPAP in the ER--CO2 on admit 140--> now 44  -Multifactorial with COPD and CHF exacerbation contributing (COPD > CHF) +/- PNA  -treatment of COPD exacerbation per pulmonary / primary team  -completed treatment for possible PNA with ceftriaxone x 7 days  -treatment of CHF as below     HFrEF - acute on chronic, LVEF 25%  ETIOLOGY: NICMP per [facility] in 2019.  Does have +coronary calcium on CT scan.  Possible dyssynchrony from chronic LBBB.  Despite QRS only being 120 ms, echo shows significant dyssynchrony.     VOLUME:  - home diuretic:  torsemide 40 mg QD alternating with 20 mg QD  - inpatient diuretic:  On admission IVC dilated on echo and BNP higher than priors.  Has been getting IV lasix-->Torsemide 40 mg QD     GDMT:    (Limited by low BP)  - not currently on GDMT    DEVICE:  - he does not have an ICD.   Severe lung disease with life expectancy < 1 year.     Metabolic Encephalopathy (HCC)  - on admit likely d/t Hypercapnia, hypoxia     Nonobstructive CAD  Has coronary calcium on CT  [facility] done 2019  - on aspirin  - on pravastatin     Hyperlipidemia  - on pravastatin     KEVIN   On BiPAP nightly     Cachexia  -BMI 16     GOC.    -palliative care discussed. He is not interested at this time.  Recommend continued discussions.      Vitals:   Blood pressure 110/66, pulse 67, temperature 97.9 °F (36.6 °C), temperature source Axillary, resp. rate 18, height 5' 2\" (1.575 m), weight 40.3 kg (88 lb 13.5 oz), SpO2 96%.    Body mass index is 16.25 kg/m².    I/O last 3 completed shifts:  In: 310 [P.O.:290; " I.V.:20]  Out: 2650 [Urine:2650]    Wt Readings from Last 10 Encounters:   03/27/25 40.3 kg (88 lb 13.5 oz)   02/21/25 44.3 kg (97 lb 11.2 oz)   09/06/24 44.9 kg (99 lb)   08/13/24 45.2 kg (99 lb 9.6 oz)   07/29/24 44.7 kg (98 lb 9.6 oz)   04/15/24 44.4 kg (97 lb 12.8 oz)   03/11/24 45 kg (99 lb 4.8 oz)   02/04/24 45.4 kg (100 lb)   01/22/24 46.7 kg (103 lb)   01/10/24 43.5 kg (96 lb)     Vitals:    03/27/25 0214 03/27/25 0336 03/27/25 0600 03/27/25 0736   BP: 110/59   110/66   BP Location: Right arm   Right arm   Pulse: 61   67   Resp:    18   Temp: (!) 97.1 °F (36.2 °C)   97.9 °F (36.6 °C)   TempSrc: Axillary   Axillary   SpO2: 97%   96%   Weight:  40.3 kg (88 lb 13.5 oz) 40.3 kg (88 lb 13.5 oz)    Height:           Physical Exam  Constitutional:       Appearance: He is cachectic. He is ill-appearing.   Neck:      Vascular: No JVD.   Cardiovascular:      Rate and Rhythm: Normal rate and regular rhythm.      Heart sounds: Normal heart sounds, S1 normal and S2 normal.   Pulmonary:      Effort: Tachypnea and respiratory distress present.      Breath sounds: Wheezing and rhonchi present.      Comments: Now requiring Hi Flow NC--30L /30%.  Home O2- 4 L NC  Musculoskeletal:      Right lower leg: No edema.      Left lower leg: No edema.   Skin:     General: Skin is warm and dry.   Neurological:      Mental Status: He is alert and oriented to person, place, and time.   Psychiatric:         Behavior: Behavior is cooperative.         Cognition and Memory: Cognition normal.           Current Facility-Administered Medications:     acetaminophen (TYLENOL) tablet 650 mg, 650 mg, Oral, Q6H PRN, Richelle Martines MD    albuterol inhalation solution 2.5 mg, 2.5 mg, Nebulization, Q4H PRN, Alessia Gauthier MD, 2.5 mg at 03/25/25 0912    aluminum-magnesium hydroxide-simethicone (MAALOX) oral suspension 30 mL, 30 mL, Oral, Q6H PRN, Richelle Martines MD    aspirin chewable tablet 81 mg, 81 mg, Oral, Daily, Richelle Martines MD, 81 mg at 03/26/25  0851    budesonide (PULMICORT) inhalation solution 0.5 mg, 0.5 mg, Nebulization, Q12H, Richelle Martines MD, 0.5 mg at 03/27/25 0740    [Transfer Hold] chlorhexidine (PERIDEX) 0.12 % oral rinse 15 mL, 15 mL, Mouth/Throat, Q12H [facility], Nguyễn Liu DO, 15 mL at 03/22/25 0932    clotrimazole (LOTRIMIN) 1 % cream, , Topical, BID, Alessia Gauthier MD, Given at 03/26/25 1825    docusate sodium (COLACE) capsule 100 mg, 100 mg, Oral, BID, Richelle Martines MD, 100 mg at 03/26/25 0851    fluticasone (FLONASE) 50 mcg/act nasal spray 2 spray, 2 spray, Each Nare, Daily, Constantin Moreira MD, 2 spray at 03/26/25 1429    formoterol (PERFOROMIST) nebulizer solution 20 mcg, 20 mcg, Nebulization, Q12H, Richelle Martines MD, 20 mcg at 03/27/25 0740    heparin (porcine) subcutaneous injection 5,000 Units, 5,000 Units, Subcutaneous, Q8H [facility], Richelle Martines MD, 5,000 Units at 03/27/25 0515    ipratropium (ATROVENT) 0.02 % inhalation solution 0.5 mg, 0.5 mg, Nebulization, TID, Richelle Martines MD, 0.5 mg at 03/27/25 0740    levalbuterol (XOPENEX) inhalation solution 1.25 mg, 1.25 mg, Nebulization, TID, Richelle Martines MD, 1.25 mg at 03/27/25 0740    ondansetron (ZOFRAN) injection 4 mg, 4 mg, Intravenous, Q6H PRN, Richelle Martines MD    polyethylene glycol (MIRALAX) packet 17 g, 17 g, Oral, Daily, Richelle Martines MD, 17 g at 03/26/25 0851    pravastatin (PRAVACHOL) tablet 80 mg, 80 mg, Oral, Daily With Dinner, Richelle Martines MD, 80 mg at 03/25/25 1604    predniSONE tablet 10 mg, 10 mg, Oral, Daily, Richelle Martines MD, 10 mg at 03/26/25 0850    torsemide (DEMADEX) tablet 40 mg, 40 mg, Oral, Daily, DELIA Dickinson, 40 mg at 03/26/25 0850    Labs & Results:      Results from last 7 days   Lab Units 03/27/25  0433 03/26/25  0546 03/25/25  0458   WBC Thousand/uL 7.93 7.36 7.17   HEMOGLOBIN g/dL 10.9* 11.6* 11.4*   HEMATOCRIT % 36.5 37.2 37.4   PLATELETS Thousands/uL 199 218 172         Results from last 7 days   Lab Units 03/27/25  0763  03/26/25  0546 03/25/25  0458 03/24/25  0523   POTASSIUM mmol/L 3.9 4.1 4.0 3.1*   CHLORIDE mmol/L 88* 88* 88* 87*   CO2 mmol/L 44* 44* 42* 44*   BUN mg/dL 30* 28* 27* 30*   CREATININE mg/dL 0.89 0.63 0.64 0.70   CALCIUM mg/dL 8.7 8.5 8.6 8.7   ALK PHOS U/L  --   --   --  40   ALT U/L  --   --   --  22   AST U/L  --   --   --  23             Thank you for the opportunity to participate in the care of this patient.    DELIA Betancourt  Advanced Heart Failure  [facility]

## 2025-03-27 NOTE — ASSESSMENT & PLAN NOTE
Wt Readings from Last 3 Encounters:   03/27/25 40.3 kg (88 lb 13.5 oz)   02/21/25 44.3 kg (97 lb 11.2 oz)   09/06/24 44.9 kg (99 lb)

## 2025-03-28 LAB
ANION GAP SERPL CALCULATED.3IONS-SCNC: 5 MMOL/L (ref 4–13)
BASOPHILS # BLD AUTO: 0.02 THOUSANDS/ÂΜL (ref 0–0.1)
BASOPHILS NFR BLD AUTO: 0 % (ref 0–1)
BUN SERPL-MCNC: 35 MG/DL (ref 5–25)
CALCIUM SERPL-MCNC: 8.6 MG/DL (ref 8.4–10.2)
CHLORIDE SERPL-SCNC: 87 MMOL/L (ref 96–108)
CO2 SERPL-SCNC: 44 MMOL/L (ref 21–32)
CREAT SERPL-MCNC: 0.78 MG/DL (ref 0.6–1.3)
EOSINOPHIL # BLD AUTO: 0.15 THOUSAND/ÂΜL (ref 0–0.61)
EOSINOPHIL NFR BLD AUTO: 2 % (ref 0–6)
ERYTHROCYTE [DISTWIDTH] IN BLOOD BY AUTOMATED COUNT: 12.9 % (ref 11.6–15.1)
GFR SERPL CREATININE-BSD FRML MDRD: 93 ML/MIN/1.73SQ M
GLUCOSE SERPL-MCNC: 92 MG/DL (ref 65–140)
HCT VFR BLD AUTO: 33.1 % (ref 36.5–49.3)
HGB BLD-MCNC: 10.2 G/DL (ref 12–17)
IMM GRANULOCYTES # BLD AUTO: 0.04 THOUSAND/UL (ref 0–0.2)
IMM GRANULOCYTES NFR BLD AUTO: 1 % (ref 0–2)
LYMPHOCYTES # BLD AUTO: 0.76 THOUSANDS/ÂΜL (ref 0.6–4.47)
LYMPHOCYTES NFR BLD AUTO: 11 % (ref 14–44)
MCH RBC QN AUTO: 30.5 PG (ref 26.8–34.3)
MCHC RBC AUTO-ENTMCNC: 30.8 G/DL (ref 31.4–37.4)
MCV RBC AUTO: 99 FL (ref 82–98)
MONOCYTES # BLD AUTO: 0.97 THOUSAND/ÂΜL (ref 0.17–1.22)
MONOCYTES NFR BLD AUTO: 14 % (ref 4–12)
NEUTROPHILS # BLD AUTO: 5.18 THOUSANDS/ÂΜL (ref 1.85–7.62)
NEUTS SEG NFR BLD AUTO: 72 % (ref 43–75)
NRBC BLD AUTO-RTO: 0 /100 WBCS
PLATELET # BLD AUTO: 189 THOUSANDS/UL (ref 149–390)
PMV BLD AUTO: 10.8 FL (ref 8.9–12.7)
POTASSIUM SERPL-SCNC: 3.4 MMOL/L (ref 3.5–5.3)
RBC # BLD AUTO: 3.34 MILLION/UL (ref 3.88–5.62)
SODIUM SERPL-SCNC: 136 MMOL/L (ref 135–147)
WBC # BLD AUTO: 7.12 THOUSAND/UL (ref 4.31–10.16)

## 2025-03-28 PROCEDURE — 85025 COMPLETE CBC W/AUTO DIFF WBC: CPT | Performed by: FAMILY MEDICINE

## 2025-03-28 PROCEDURE — 99497 ADVNCD CARE PLAN 30 MIN: CPT | Performed by: PHYSICIAN ASSISTANT

## 2025-03-28 PROCEDURE — 94660 CPAP INITIATION&MGMT: CPT

## 2025-03-28 PROCEDURE — 94664 DEMO&/EVAL PT USE INHALER: CPT

## 2025-03-28 PROCEDURE — 99232 SBSQ HOSP IP/OBS MODERATE 35: CPT | Performed by: FAMILY MEDICINE

## 2025-03-28 PROCEDURE — 94760 N-INVAS EAR/PLS OXIMETRY 1: CPT

## 2025-03-28 PROCEDURE — 99232 SBSQ HOSP IP/OBS MODERATE 35: CPT | Performed by: INTERNAL MEDICINE

## 2025-03-28 PROCEDURE — 80048 BASIC METABOLIC PNL TOTAL CA: CPT | Performed by: FAMILY MEDICINE

## 2025-03-28 PROCEDURE — 94640 AIRWAY INHALATION TREATMENT: CPT

## 2025-03-28 RX ORDER — POTASSIUM CHLORIDE 1500 MG/1
40 TABLET, EXTENDED RELEASE ORAL ONCE
Status: COMPLETED | OUTPATIENT
Start: 2025-03-28 | End: 2025-03-28

## 2025-03-28 RX ADMIN — HEPARIN SODIUM 5000 UNITS: 5000 INJECTION INTRAVENOUS; SUBCUTANEOUS at 15:38

## 2025-03-28 RX ADMIN — PREDNISONE 10 MG: 10 TABLET ORAL at 08:42

## 2025-03-28 RX ADMIN — LEVALBUTEROL HYDROCHLORIDE 1.25 MG: 1.25 SOLUTION RESPIRATORY (INHALATION) at 01:23

## 2025-03-28 RX ADMIN — POTASSIUM CHLORIDE 40 MEQ: 1500 TABLET, EXTENDED RELEASE ORAL at 08:42

## 2025-03-28 RX ADMIN — BUDESONIDE 0.5 MG: 0.5 INHALANT RESPIRATORY (INHALATION) at 19:04

## 2025-03-28 RX ADMIN — IPRATROPIUM BROMIDE 0.5 MG: 0.5 SOLUTION RESPIRATORY (INHALATION) at 07:06

## 2025-03-28 RX ADMIN — TORSEMIDE 40 MG: 20 TABLET ORAL at 08:42

## 2025-03-28 RX ADMIN — LEVALBUTEROL HYDROCHLORIDE 1.25 MG: 1.25 SOLUTION RESPIRATORY (INHALATION) at 13:32

## 2025-03-28 RX ADMIN — BUDESONIDE 0.5 MG: 0.5 INHALANT RESPIRATORY (INHALATION) at 07:06

## 2025-03-28 RX ADMIN — FLUTICASONE PROPIONATE 2 SPRAY: 50 SPRAY, METERED NASAL at 08:44

## 2025-03-28 RX ADMIN — PRAVASTATIN SODIUM 80 MG: 80 TABLET ORAL at 17:47

## 2025-03-28 RX ADMIN — IPRATROPIUM BROMIDE 0.5 MG: 0.5 SOLUTION RESPIRATORY (INHALATION) at 01:23

## 2025-03-28 RX ADMIN — IPRATROPIUM BROMIDE 0.5 MG: 0.5 SOLUTION RESPIRATORY (INHALATION) at 13:32

## 2025-03-28 RX ADMIN — CLOTRIMAZOLE: 1 CREAM TOPICAL at 17:48

## 2025-03-28 RX ADMIN — LEVALBUTEROL HYDROCHLORIDE 1.25 MG: 1.25 SOLUTION RESPIRATORY (INHALATION) at 07:06

## 2025-03-28 RX ADMIN — LEVALBUTEROL HYDROCHLORIDE 1.25 MG: 1.25 SOLUTION RESPIRATORY (INHALATION) at 19:04

## 2025-03-28 RX ADMIN — FORMOTEROL FUMARATE DIHYDRATE 20 MCG: 20 SOLUTION RESPIRATORY (INHALATION) at 19:04

## 2025-03-28 RX ADMIN — CLOTRIMAZOLE: 1 CREAM TOPICAL at 08:44

## 2025-03-28 RX ADMIN — DOCUSATE SODIUM 100 MG: 100 CAPSULE, LIQUID FILLED ORAL at 17:47

## 2025-03-28 RX ADMIN — HEPARIN SODIUM 5000 UNITS: 5000 INJECTION INTRAVENOUS; SUBCUTANEOUS at 05:36

## 2025-03-28 RX ADMIN — FORMOTEROL FUMARATE DIHYDRATE 20 MCG: 20 SOLUTION RESPIRATORY (INHALATION) at 07:06

## 2025-03-28 RX ADMIN — ASPIRIN 81 MG CHEWABLE TABLET 81 MG: 81 TABLET CHEWABLE at 08:42

## 2025-03-28 RX ADMIN — DOCUSATE SODIUM 100 MG: 100 CAPSULE, LIQUID FILLED ORAL at 08:42

## 2025-03-28 RX ADMIN — IPRATROPIUM BROMIDE 0.5 MG: 0.5 SOLUTION RESPIRATORY (INHALATION) at 19:04

## 2025-03-28 NOTE — PLAN OF CARE
Problem: Potential for Falls  Goal: Patient will remain free of falls  Description: INTERVENTIONS:  - Educate patient/family on patient safety including physical limitations  - Instruct patient to call for assistance with activity   - Consult OT/PT to assist with strengthening/mobility   - Keep Call bell within reach  - Keep bed low and locked with side rails adjusted as appropriate  - Keep care items and personal belongings within reach  - Initiate and maintain comfort rounds  - Make Fall Risk Sign visible to staff  - Offer Toileting every 2 Hours, in advance of need  - Initiate/Maintain bedalarm  - Obtain necessary fall risk management equipment:    - Apply yellow socks and bracelet for high fall risk patients  - Consider moving patient to room near nurses station  Outcome: Progressing     Problem: INFECTION - ADULT  Goal: Absence or prevention of progression during hospitalization  Description: INTERVENTIONS:  - Assess and monitor for signs and symptoms of infection  - Monitor lab/diagnostic results  - Monitor all insertion sites, i.e. indwelling lines, tubes, and drains  - Monitor endotracheal if appropriate and nasal secretions for changes in amount and color  - [place] appropriate cooling/warming therapies per order  - Administer medications as ordered  - Instruct and encourage patient and family to use good hand hygiene technique  - Identify and instruct in appropriate isolation precautions for identified infection/condition  Outcome: Progressing     Problem: SAFETY ADULT  Goal: Patient will remain free of falls  Description: INTERVENTIONS:  - Educate patient/family on patient safety including physical limitations  - Instruct patient to call for assistance with activity   - Consult OT/PT to assist with strengthening/mobility   - Keep Call bell within reach  - Keep bed low and locked with side rails adjusted as appropriate  - Keep care items and personal belongings within reach  - Initiate and maintain  comfort rounds  - Make Fall Risk Sign visible to staff  - Offer Toileting every 2 Hours, in advance of need  - Initiate/Maintain BED alarm  - Obtain necessary fall risk management equipment:    - Apply yellow socks and bracelet for high fall risk patients  - Consider moving patient to room near nurses station  Outcome: Progressing

## 2025-03-28 NOTE — RESPIRATORY THERAPY NOTE
03/28/25 0706   Respiratory Assessment   Assessment Type Pre-treatment   Bilateral Breath Sounds Diminished   Resp Comments Pt awake, appears comfortable on NC. RX given. Will continue to monitor pt.   Oxygen Therapy/Pulse Ox   O2 Device Nasal cannula   O2 Therapy Oxygen   SpO2 94 %   SpO2 Activity At Rest   $ Pulse Oximetry Spot Check Charge Completed

## 2025-03-28 NOTE — ASSESSMENT & PLAN NOTE
Patient with multiple comorbidities and overall guarded prognosis.  Still continues Level 1 Full Code at this time as per family wishes  Palliative care team following, involvement appreciated

## 2025-03-28 NOTE — PROGRESS NOTES
Advanced Heart Failure / Pulmonary Hypertension Service Progress Note    Natalio Hartmann Jr. 69 y.o. male   MRN: [id number]  Unit/Bed#: [facility] 431-01; Encounter: [id number]    Assessment:  Principal Problem:    Acute on chronic respiratory failure with hypoxia and hypercapnia (HCC)  Active Problems:    Obstructive sleep apnea    Goals of care, counseling/discussion    Acute on chronic systolic congestive heart failure (HCC)    SIRS (systemic inflammatory response syndrome)    COPD exacerbation (HCC)    Hyperlipidemia    Malnutrition (HCC)    Palliative care by specialist    Metabolic encephalopathy    Severe protein-calorie malnutrition (HCC)    69 y.o. male with a PMH of severe COPD, chronic hypoxic and hypercapnic respiratory failure on 4 L supplemental oxygen, cachexia, nonischemic cardiomyopathy, HFrEF -- who presents to the ER 3-14-25 with worsening shortness of breath, increasingly lethargic feeling, and fatigue. On day of admit, he was visibly short of breath slumped in the chair noted by his wife and brought to the ER. In the ED he was noted to be hypoxic and hypercapnic requiring BiPAP. BNP on admit>1000 (higher than priors). CXR showed pulmonary congestion. Unable to wean off Bipap--admitted to MICU. Heart failure team consulted. Transfered to floor 3-22-25.     Subjective:   Patient seen and examined.  States he is still feeling SOB and dissatisfied with room temperature.  Wife at bedside and inquiring about time of GOC discussion today    Objective:   Intake/ Output: 461/ 800 (-339)  Weight:  admit 102 lbs--93--93--87--87--88--88 lbs bed (same , -14 lbs total)   Telemetry:     Today's Plan:  Euvolemic on exam. Continue Torsemide 40 mg PO QD today.  Creatinine stable, weights stable  No current GDMT d/t hypotn.  BP's   Appreciate Pulmonology input--S/p IV steroids--now on prednisone, s/p IV abx for PNA  GOC meeting today 2 pm with SLIM, Pulmonology, Palliative team  S/p HI flow yesterday, return  "to 4 L NC today--remains tachypnic, dyspnea at rest.  Pulse ox stable in the 90's  Repeat BMP in a.m.    Plan:  Acute on Chronic Respiratory Failure with hypoxia and hypercapnia  # COPD exacerbation  -Former smoker; 105 pack years, quit in 2012.   -Multiple hospitalizations with acute COPD exacerbations   -Required BiPAP in the ER--CO2 on admit 140--> now 44  -Multifactorial with COPD and CHF exacerbation contributing (COPD > CHF) +/- PNA  -treatment of COPD exacerbation per pulmonary / primary team  -completed treatment for possible PNA with ceftriaxone x 7 days  -treatment of CHF as below     HFrEF - acute on chronic, LVEF 25%  ETIOLOGY: NICMP per [facility] in 2019.  Does have +coronary calcium on CT scan.  Possible dyssynchrony from chronic LBBB.  Despite QRS only being 120 ms, echo shows significant dyssynchrony.     VOLUME:  - home diuretic:  torsemide 40 mg QD alternating with 20 mg QD  - inpatient diuretic:  On admission IVC dilated on echo and BNP higher than priors.  Has been getting IV lasix-->Torsemide 40 mg QD     GDMT:    (Limited by low BP)  - not currently on GDMT    DEVICE:  - he does not have an ICD.   Severe lung disease with life expectancy < 1 year.     Metabolic Encephalopathy (HCC)  - on admit likely d/t Hypercapnia, hypoxia     Nonobstructive CAD  Has coronary calcium on CT  [facility] done 2019  - on aspirin  - on pravastatin     Hyperlipidemia  - on pravastatin     KEVIN   On BiPAP nightly     Cachexia  -BMI 16     GOC.    -palliative care discussed. He is not interested at this time.  Recommend continued discussions.        Vitals:   Blood pressure 106/65, pulse 76, temperature 98.1 °F (36.7 °C), resp. rate 18, height 5' 2\" (1.575 m), weight 40.3 kg (88 lb 13.5 oz), SpO2 94%.    Body mass index is 16.25 kg/m².    I/O last 3 completed shifts:  In: 771 [P.O.:751; I.V.:20]  Out: 1650 [Urine:1650]    Wt Readings from Last 10 Encounters:   03/28/25 40.3 kg (88 lb 13.5 oz)   02/21/25 44.3 kg (97 lb 11.2 oz) "   09/06/24 44.9 kg (99 lb)   08/13/24 45.2 kg (99 lb 9.6 oz)   07/29/24 44.7 kg (98 lb 9.6 oz)   04/15/24 44.4 kg (97 lb 12.8 oz)   03/11/24 45 kg (99 lb 4.8 oz)   02/04/24 45.4 kg (100 lb)   01/22/24 46.7 kg (103 lb)   01/10/24 43.5 kg (96 lb)     Vitals:    03/28/25 0312 03/28/25 0456 03/28/25 0704 03/28/25 0706   BP:   106/65    BP Location:       Pulse:   76    Resp:   18    Temp:   98.1 °F (36.7 °C)    TempSrc:       SpO2: 97%  95% 94%   Weight:  40.3 kg (88 lb 13.5 oz)     Height:           Physical Exam  Constitutional:       Appearance: He is cachectic. He is ill-appearing.   Neck:      Vascular: No JVD.   Cardiovascular:      Rate and Rhythm: Normal rate and regular rhythm.      Heart sounds: S1 normal and S2 normal.   Pulmonary:      Effort: Tachypnea, accessory muscle usage and respiratory distress present.      Breath sounds: Wheezing present.      Comments: S/p HI flow-->  now on 4 L NC  Musculoskeletal:      Right lower leg: No edema.      Left lower leg: No edema.   Skin:     General: Skin is warm and dry.   Neurological:      Mental Status: He is alert and oriented to person, place, and time.   Psychiatric:         Behavior: Behavior is cooperative.         Cognition and Memory: Cognition normal.         Current Facility-Administered Medications:     acetaminophen (TYLENOL) tablet 650 mg, 650 mg, Oral, Q6H PRN, Richelle Martines MD    albuterol inhalation solution 2.5 mg, 2.5 mg, Nebulization, Q4H PRN, Alessia Gauthier MD, 2.5 mg at 03/27/25 2240    aluminum-magnesium hydroxide-simethicone (MAALOX) oral suspension 30 mL, 30 mL, Oral, Q6H PRN, Richelle Martines MD    aspirin chewable tablet 81 mg, 81 mg, Oral, Daily, Richelle Martnies MD, 81 mg at 03/27/25 0919    budesonide (PULMICORT) inhalation solution 0.5 mg, 0.5 mg, Nebulization, Q12H, Richelle Martines MD, 0.5 mg at 03/28/25 0706    [Transfer Hold] chlorhexidine (PERIDEX) 0.12 % oral rinse 15 mL, 15 mL, Mouth/Throat, Q12H [facility], Nguyễn Liu DO, 15 mL at  03/22/25 0932    clotrimazole (LOTRIMIN) 1 % cream, , Topical, BID, Alessia Gauthier MD, Given at 03/27/25 1712    docusate sodium (COLACE) capsule 100 mg, 100 mg, Oral, BID, Richelle Martines MD, 100 mg at 03/27/25 1710    fluticasone (FLONASE) 50 mcg/act nasal spray 2 spray, 2 spray, Each Nare, Daily, Constantin Moreira MD, 2 spray at 03/27/25 0920    formoterol (PERFOROMIST) nebulizer solution 20 mcg, 20 mcg, Nebulization, Q12H, Richelle Martines MD, 20 mcg at 03/28/25 0706    heparin (porcine) subcutaneous injection 5,000 Units, 5,000 Units, Subcutaneous, Q8H [facility], Richelle Martines MD, 5,000 Units at 03/28/25 0536    ipratropium (ATROVENT) 0.02 % inhalation solution 0.5 mg, 0.5 mg, Nebulization, Q6H, Alessia Gauthier MD, 0.5 mg at 03/28/25 0706    levalbuterol (XOPENEX) inhalation solution 1.25 mg, 1.25 mg, Nebulization, Q6H, Alessia Gauthier MD, 1.25 mg at 03/28/25 0706    ondansetron (ZOFRAN) injection 4 mg, 4 mg, Intravenous, Q6H PRN, Richelle Martines MD    polyethylene glycol (MIRALAX) packet 17 g, 17 g, Oral, Daily, Richelle Martines MD, 17 g at 03/26/25 0851    pravastatin (PRAVACHOL) tablet 80 mg, 80 mg, Oral, Daily With Dinner, Richelle Martines MD, 80 mg at 03/27/25 1710    predniSONE tablet 10 mg, 10 mg, Oral, Daily, Richelle Martines MD, 10 mg at 03/27/25 0919    torsemide (DEMADEX) tablet 40 mg, 40 mg, Oral, Daily, DELIA Dickinson, 40 mg at 03/27/25 0919    Labs & Results:      Results from last 7 days   Lab Units 03/28/25  0447 03/27/25  0433 03/26/25  0546   WBC Thousand/uL 7.12 7.93 7.36   HEMOGLOBIN g/dL 10.2* 10.9* 11.6*   HEMATOCRIT % 33.1* 36.5 37.2   PLATELETS Thousands/uL 189 199 218         Results from last 7 days   Lab Units 03/28/25  0447 03/27/25  0433 03/26/25  0546 03/25/25  0458 03/24/25  0523   POTASSIUM mmol/L 3.4* 3.9 4.1   < > 3.1*   CHLORIDE mmol/L 87* 88* 88*   < > 87*   CO2 mmol/L 44* 44* 44*   < > 44*   BUN mg/dL 35* 30* 28*   < > 30*   CREATININE mg/dL 0.78 0.89 0.63   < > 0.70   CALCIUM  mg/dL 8.6 8.7 8.5   < > 8.7   ALK PHOS U/L  --   --   --   --  40   ALT U/L  --   --   --   --  22   AST U/L  --   --   --   --  23    < > = values in this interval not displayed.             Thank you for the opportunity to participate in the care of this patient.    DELIA Betancourt  Advanced Heart Failure  [facility]

## 2025-03-28 NOTE — PROGRESS NOTES
Progress Note - Hospitalist   Name: Natalio Hartmann Jr. 69 y.o. male I MRN: [id number]  Unit/Bed#: [facility] 431-01 I Date of Admission: 3/14/2025   Date of Service: 3/28/2025 I Hospital Day: 14    Assessment & Plan  Acute on chronic respiratory failure with hypoxia and hypercapnia (HCC)  History of COPD and CHF, baseline O2 needs of 4 L via nasal cannula  Uses trilogy NIV at night and is on prednisone 10 mg daily chronically  Admitted to ICU for IV diuresis and respiratory support with BiPAP, steroids, nebulizers,  improved and tansferred to floor on 3/22  Most likely due to severe COPD  Pulmonology following  Pulmonary following, continues to need intermittent BiPAP versus high flow nasal cannula due to dyspnea   family meeting with palliative care, cardiology, pulmonology and patient's wife done 3/28, patient's goal is to go home; he wants to be discharged as soon as possible; need to actively participate in PT over the weekend to improve endurance.  Plan for another meeting on Monday, 3/31  Acute on chronic systolic congestive heart failure (HCC)  Wt Readings from Last 3 Encounters:   03/28/25 40.3 kg (88 lb 13.5 oz)   02/21/25 44.3 kg (97 lb 11.2 oz)   09/06/24 44.9 kg (99 lb)   Known history of nonischemic cardiomyopathy with LVEF 25%  Presents with worsening shortness of breath volume overload state noted  Admitted to ICU, volume status was improving and patient deemed appropriate for discharge to the floor on 3/22  transitioned to PO diuretics on 3/25- torsemide 40 mg daily  Follow I's/O, daily weight, and monitor  COPD exacerbation (HCC)  Monitored by pulmonology during hospitalization.  Low concern for acute exacerbation  Continue inhaler regimen and prednisone 10 mg daily  SIRS (systemic inflammatory response syndrome)  SIRS present on admission, patient initially covered for suspected pneumonia. Symptoms felt more likely to be related to CHF, and abx stopped after 4 days  Blood cultures negative  Monitor off  antibiotics  Obstructive sleep apnea  BiPAP as needed and at bedtime, patient appears to be tolerating  Metabolic encephalopathy  Improved  Secondary to respiratory failure which is multifactorial related to COPD and CHF.  Probable hypoxia and hypercapnia component as well  Status appears to be improving with nocturnal BIPAP  Continue supportive measures and monitor  Severe protein-calorie malnutrition (HCC)  Malnutrition Findings:     Body mass index is 16.25 kg/m².   Encourage adequate protein and calorie intake  Goals of care, counseling/discussion  Patient with multiple comorbidities and overall guarded prognosis.  Still continues Level 1 Full Code at this time as per family wishes  Palliative care team following, involvement appreciated  Hyperlipidemia  Resume Pravachol 80mg po qd  Malnutrition (HCC)  Malnutrition Findings:   Adult Malnutrition type: Chronic illness  Adult Degree of Malnutrition: Other severe protein calorie malnutrition  Malnutrition Characteristics: Fat loss, Muscle loss              360 Statement: severe malnutrition r/t chronic illness as evidenced by severe muscle loss around clavicles and shoulders, moderate-severe muscle loss in temples, severe buccal fat pad loss, apparent depression between ribs suggesting severe fat loss. Treatment: oral diet and oral nutrition supplements  BMI Findings:         Body mass index is 16.25 kg/m².   Palliative care by specialist      VTE Pharmacologic Prophylaxis: VTE Score: 8 High Risk (Score >/= 5) - Pharmacological DVT Prophylaxis Ordered: heparin. Sequential Compression Devices Ordered.    Mobility:   Basic Mobility Inpatient Raw Score: 13  JH-HLM Goal: 4: Move to chair/commode  JH-HLM Achieved: 2: Bed activities/Dependent transfer  JH-HLM Goal NOT achieved. Continue with multidisciplinary rounding and encourage appropriate mobility to improve upon JH-HLM goals.    Patient Centered Rounds: I performed bedside rounds with nursing staff today.    Discussions with Specialists or Other Care Team Provider: RN; cardiology, pulmonology    Education and Discussions with Family / Patient:  discussed with the patient and wife at bedside.     Current Length of Stay: 14 day(s)  Current Patient Status: Inpatient   Certification Statement: The patient will continue to require additional inpatient hospital stay due to need for diuresis, management of respiratory failure  Discharge Plan:pending discussion of goals of care, family meeting planned again on 3/31    Code Status: Level 1 - Full Code    Subjective   Patient seen and examined, use of accessory muscles noted.  Continues to require intermittent BiPAP alternating with high flow/ low FiO2 nasal cannula    Objective :  Temp:  [98.1 °F (36.7 °C)-99.8 °F (37.7 °C)] 99.8 °F (37.7 °C)  HR:  [71-87] 87  BP: ()/(56-70) 98/62  Resp:  [18] 18  SpO2:  [94 %-98 %] 98 %  O2 Device: Nasal cannula  Nasal Cannula O2 Flow Rate (L/min):  [4 L/min] 4 L/min  FiO2 (%):  [30] 30    Body mass index is 16.25 kg/m².     Input and Output Summary (last 24 hours):     Intake/Output Summary (Last 24 hours) at 3/28/2025 1706  Last data filed at 3/28/2025 1547  Gross per 24 hour   Intake 441 ml   Output 1100 ml   Net -659 ml       Physical Exam  Constitutional:       General: He is in acute distress.      Appearance: He is ill-appearing.   HENT:      Head: Normocephalic and atraumatic.   Pulmonary:      Effort: Respiratory distress present.      Breath sounds: No wheezing, rhonchi or rales.      Comments: Diminished breath sounds b/l  Chest:      Chest wall: No tenderness.   Abdominal:      General: Abdomen is flat.      Palpations: Abdomen is soft.      Tenderness: There is no abdominal tenderness.   Musculoskeletal:      Right lower leg: No edema.      Left lower leg: No edema.   Skin:     General: Skin is warm and dry.      Capillary Refill: Capillary refill takes less than 2 seconds.   Neurological:      Mental Status: He is oriented  to person, place, and time.           Lines/Drains:  Lines/Drains/Airways       Active Status       Name Placement date Placement time Site Days    External Urinary Catheter 03/21/25  1300  -- 7                      Telemetry:  Telemetry Orders (From admission, onward)               24 Hour Telemetry Monitoring  Continuous x 24 Hours (Telem)        Expiring   Question:  Reason for 24 Hour Telemetry  Answer:  Decompensated CHF- and any one of the following: continuous diuretic infusion or total diuretic dose >200 mg daily, associated electrolyte derangement (I.e. K < 3.0), inotropic drip (continuous infusion), hx of ventricular arrhythmia, or new EF < 35%                                Lab Results: I have reviewed the following results:   Results from last 7 days   Lab Units 03/28/25  0447   WBC Thousand/uL 7.12   HEMOGLOBIN g/dL 10.2*   HEMATOCRIT % 33.1*   PLATELETS Thousands/uL 189   SEGS PCT % 72   LYMPHO PCT % 11*   MONO PCT % 14*   EOS PCT % 2     Results from last 7 days   Lab Units 03/28/25  0447 03/25/25  0458 03/24/25  0523   SODIUM mmol/L 136   < > 137   POTASSIUM mmol/L 3.4*   < > 3.1*   CHLORIDE mmol/L 87*   < > 87*   CO2 mmol/L 44*   < > 44*   BUN mg/dL 35*   < > 30*   CREATININE mg/dL 0.78   < > 0.70   ANION GAP mmol/L 5   < > 6   CALCIUM mg/dL 8.6   < > 8.7   ALBUMIN g/dL  --   --  3.6   TOTAL BILIRUBIN mg/dL  --   --  0.68   ALK PHOS U/L  --   --  40   ALT U/L  --   --  22   AST U/L  --   --  23   GLUCOSE RANDOM mg/dL 92   < > 116    < > = values in this interval not displayed.         Results from last 7 days   Lab Units 03/26/25  1703   POC GLUCOSE mg/dl 217*               Recent Cultures (last 7 days):               Last 24 Hours Medication List:     Current Facility-Administered Medications:     acetaminophen (TYLENOL) tablet 650 mg, Q6H PRN    albuterol inhalation solution 2.5 mg, Q4H PRN    aluminum-magnesium hydroxide-simethicone (MAALOX) oral suspension 30 mL, Q6H PRN    aspirin chewable  tablet 81 mg, Daily    budesonide (PULMICORT) inhalation solution 0.5 mg, Q12H    [Transfer Hold] chlorhexidine (PERIDEX) 0.12 % oral rinse 15 mL, Q12H GABE    clotrimazole (LOTRIMIN) 1 % cream, BID    docusate sodium (COLACE) capsule 100 mg, BID    fluticasone (FLONASE) 50 mcg/act nasal spray 2 spray, Daily    formoterol (PERFOROMIST) nebulizer solution 20 mcg, Q12H    heparin (porcine) subcutaneous injection 5,000 Units, Q8H GABE    ipratropium (ATROVENT) 0.02 % inhalation solution 0.5 mg, Q6H    levalbuterol (XOPENEX) inhalation solution 1.25 mg, Q6H    ondansetron (ZOFRAN) injection 4 mg, Q6H PRN    polyethylene glycol (MIRALAX) packet 17 g, Daily    pravastatin (PRAVACHOL) tablet 80 mg, Daily With Dinner    predniSONE tablet 10 mg, Daily    torsemide (DEMADEX) tablet 40 mg, Daily    Administrative Statements   Today, Patient Was Seen By: Alessia Gauthier MD      **Please Note: This note may have been constructed using a voice recognition system.**

## 2025-03-28 NOTE — PROGRESS NOTES
Progress Note - Pulmonology   Name: Natalio Hartmann Jr. 69 y.o. male I MRN: [id number]  Unit/Bed#: [facility] 431-01 I Date of Admission: 3/14/2025   Date of Service: 3/28/2025 I Hospital Day: 14    Assessment & Plan  Acute on chronic respiratory failure with hypoxia and hypercapnia (HCC)  Acute on Chronic Hypoxic Hypercapnic respiratory failure 2/2 COPD/CHF exacerbation S/P MICU admission for IV diuresis, intermittent steroid dosing. Transferred to medical floor.     Trial high flow / low FiO2 for air hunger   Not a candidate for at-home My-Airvo 3 due to nocturnal Trilogy usage  BIPAP 15/6 at night and as needed.  Morphine PRN, C/W Fan therapy  Family meeting today to discuss [facility]  COPD exacerbation (HCC)  Fully resolved at this point  Baseline FEV1 22%  Baseline O2 requirement 4L with nocutrnal Trilogy  Continue nebulized bronchodilators budesonide, formoterol  Pulmonary toileting  Continue with prednisone 10 mg daily (baseline outpt steroid dosing)  Obstructive sleep apnea  On Trilogy NIV at night at home  C/W BIPAP as above  Metabolic encephalopathy  Improved likely in setting of acute on chronic hypercapnic respiratory failure  Patient Aox3  Severe protein-calorie malnutrition (HCC)  Malnutrition Findings:   Adult Malnutrition type: Chronic illness  Adult Degree of Malnutrition: Other severe protein calorie malnutrition  Malnutrition Characteristics: Fat loss, Muscle loss     360 Statement: severe malnutrition r/t chronic illness as evidenced by severe muscle loss around clavicles and shoulders, moderate-severe muscle loss in temples, severe buccal fat pad loss, apparent depression between ribs suggesting severe fat loss. Treatment: oral diet and oral nutrition supplements  Body mass index is 16.25 kg/m².   Goals of care, counseling/discussion  Readdressed goals of care today while at bedside with patient's wife and patient. States that he wants to go home because he would be more comfortable in regards to the  "temperature (too hot in room trung) and being able to \"do more than he's doing now in bed\". Ultimately he stated that he wants to continue current management and will continue working with physical therapy to get stronger.    Family meeting today - concluded patient will work with therapy for 2 additional days but wants to go home on Monday morning 3/31.   Palliative care by specialist      Recommendations  - Monitor oxygen & supplement as needed for goal above 88%   - C/W N/C alternating with BiPAP as needed and at night  - Fan therapy, consider PRN Morphine for dyspnea  - Continue with torsemide 40 mg per primary team/ heart failure  - Continue nebulized bronchodilators budesonide, formoterol, Xopenex Atrovent nebs will increase frequency to 4 times daily  - Pulmonary toileting  - Continue with prednisone 10 mg daily (baseline steroid dosing)  - PT OT, mobility as tolerated  - Ongoing palliative care discussions    24 Hour Events : No acute events over past 24 hours.   Subjective : Seen and examined at bedside. Met with patient's wife. Patient states that breathing is on and off. Wants room cooler. Asked to be put on BIPAP while at bedside. Denies chest pain, palpitations    Objective :  Temp:  [98.1 °F (36.7 °C)-99.2 °F (37.3 °C)] 98.1 °F (36.7 °C)  HR:  [71-84] 76  BP: ()/(56-70) 106/65  Resp:  [18] 18  SpO2:  [94 %-98 %] 94 %  O2 Device: Nasal cannula  FiO2 (%):  [30] 30    Physical Exam  Constitutional:       General: He is in acute distress.      Appearance: He is ill-appearing.   HENT:      Head: Normocephalic and atraumatic.      Mouth/Throat:      Mouth: Mucous membranes are dry.   Pulmonary:      Effort: Respiratory distress present.      Breath sounds: No wheezing, rhonchi or rales.      Comments: Breath sounds distant  Chest:      Chest wall: No tenderness.   Abdominal:      General: Abdomen is flat. There is no distension.      Palpations: Abdomen is soft.   Musculoskeletal:      Right lower leg: " No edema.      Left lower leg: No edema.   Skin:     General: Skin is warm and dry.      Capillary Refill: Capillary refill takes less than 2 seconds.   Neurological:      Mental Status: He is oriented to person, place, and time.   Psychiatric:         Mood and Affect: Mood normal.           Lab Results: I have reviewed the following results:   .     03/28/25  0447   WBC 7.12   HGB 10.2*   HCT 33.1*      SODIUM 136   K 3.4*   CL 87*   CO2 44*   BUN 35*   CREATININE 0.78   GLUC 92     ABG: No new results in last 24 hours.    Imaging Results Review: I personally reviewed the following image studies/reports in PACS and discussed pertinent findings with Radiology: chest xray. My interpretation of the radiology images/reports is:  . 3/14: CXR:significant for bilateral infiltrates concerning for pulmonary vascular congestion   Other Study Results Review: No additional pertinent studies reviewed.  PFT Results Reviewed: reviewed   Localized Dermabrasion With Wire Brush Text: The patient was draped in routine manner.  Localized dermabrasion using 3 x 17 mm wire brush was performed in routine manner to papillary dermis. This spot dermabrasion is being performed to complete skin cancer reconstruction. It also will eliminate the other sun damaged precancerous cells that are known to be part of the regional effect of a lifetime's worth of sun exposure. This localized dermabrasion is therapeutic and should not be considered cosmetic in any regard. Localized Dermabrasion Text: The patient was draped in routine manner.  Localized dermabrasion using 3 x 17 mm wire brush was performed in routine manner to papillary dermis. This spot dermabrasion is being performed to complete skin cancer reconstruction. It also will eliminate the other sun damaged precancerous cells that are known to be part of the regional effect of a lifetime's worth of sun exposure. This localized dermabrasion is therapeutic and should not be considered cosmetic in any regard.

## 2025-03-28 NOTE — ASSESSMENT & PLAN NOTE
History of COPD and CHF, baseline O2 needs of 4 L via nasal cannula  Uses trilogy NIV at night and is on prednisone 10 mg daily chronically  Admitted to ICU for IV diuresis and respiratory support with BiPAP, steroids, nebulizers,  improved and tansferred to floor on 3/22  Most likely due to severe COPD  Pulmonology following  Pulmonary following, continues to need intermittent BiPAP versus high flow nasal cannula due to dyspnea   family meeting with palliative care, cardiology, pulmonology and patient's wife done 3/28, patient's goal is to go home; he wants to be discharged as soon as possible; need to actively participate in PT over the weekend to improve endurance.  Plan for another meeting on Monday, 3/31

## 2025-03-28 NOTE — ASSESSMENT & PLAN NOTE
"Readdressed goals of care today while at bedside with patient's wife and patient. States that he wants to go home because he would be more comfortable in regards to the temperature (too hot in room trung) and being able to \"do more than he's doing now in bed\". Ultimately he stated that he wants to continue current management and will continue working with physical therapy to get stronger.    Family meeting today - concluded patient will work with therapy for 2 additional days but wants to go home on Monday morning 3/31.   "

## 2025-03-28 NOTE — ASSESSMENT & PLAN NOTE
Acute on Chronic Hypoxic Hypercapnic respiratory failure 2/2 COPD/CHF exacerbation S/P MICU admission for IV diuresis, intermittent steroid dosing. Transferred to medical floor.     Trial high flow / low FiO2 for air hunger   Not a candidate for at-home My-Airvo 3 due to nocturnal Trilogy usage  BIPAP 15/6 at night and as needed.  Morphine PRN, C/W Fan therapy  Family meeting today to discuss GOC

## 2025-03-28 NOTE — NUTRITION
03/28/25 1617   Recommendations/Interventions   Malnutrition/BMI Present Yes  (as previously documented)   Summary Fair-good meal intake noted; feeling hungry and looking forward to dinner tonight; rx for 2g sodium, chopped meats/ vegetables and 1500mL fluid restriction; patient asking for increased food choices; patient offered ONS   Interventions/Recommendations Supplement adjust;Adjust diet order   Intervention Comments to continue Ensure Plus High Protein and Gelatein each once daily; will increase Magic Cup to BID (in place of high protein pudding since no longer available)   Recommendations to Provider Consider partial diet liberalization to 4g sodium/ no added salt if medically able (continue fluid restriction as rx/ per provider; continue chopped meals/ chopped vegetables)

## 2025-03-28 NOTE — ASSESSMENT & PLAN NOTE
Wt Readings from Last 3 Encounters:   03/28/25 40.3 kg (88 lb 13.5 oz)   02/21/25 44.3 kg (97 lb 11.2 oz)   09/06/24 44.9 kg (99 lb)   Known history of nonischemic cardiomyopathy with LVEF 25%  Presents with worsening shortness of breath volume overload state noted  Admitted to ICU, volume status was improving and patient deemed appropriate for discharge to the floor on 3/22  transitioned to PO diuretics on 3/25- torsemide 40 mg daily  Follow I's/O, daily weight, and monitor

## 2025-03-28 NOTE — ACP (ADVANCE CARE PLANNING)
Record of Family Meeting - Palliative and Supportive Care   Natalio Hartmann Jr. 69 y.o. male [id number]      Recommendations and Plan:  Ongoing efforts of medical optimization with goal to walk with PT/OT  PT/OT confirmed they will work with the patient Saturday and   Plan to regroup Monday to revisit GOC pending patient's progress  If patient remains medically stable for discharge and is walking, will proceed with his goal to return home with spouse's support.  If he has further medical setback and/or is unable to ambulate he is willing to entertain consideration of hospice as a means to get home and stay home        A family meeting was held for Natalio Hartmann. This meeting was necessary for determine the appropriate course of treatment.  Time of Meetinpm  Meeting Location: patient's room  Participants:   Dr. Gauthier, VANESSAIM  Dr. Moreira and Dr. Bennett , pulmonology  DELIA Pino, cardiology  Randa Martinez PA-C [facility]  Spouse, Adelita     Patient Participation: present, alert, and participating    Patient Support System: wife, Mellisa  Son, Serjio     Advanced Directive of POLST available: no    Topics of Discussion:  Dr. Moreira discussed advanced nature of COPD, challenge of pulmonary cachexia and deconditioning limiting discharge home  Discussed concerns with patient's ability to reside home, safely, in his condition  Patient made it very  clear that his primary goal is to go home and has poor insight into his weakness. Spouse, Adelita, appears more realistic regarding his deconditioning but nevertheless will support his wishes  Patient is approved for palliative home program in the event that he gains enough progress with PT/OT to be discharged. If not, will revisit hospice Monday.   Acknowledged that patient's symptom burden is high. However, he has previously declined opioids for symptom management support and therefor this was not  revisited at the present time  Patient clearly articulated  "expectation/hope to d/c Monday. Medical team offered to regroup Monday morning pending weekend progress.    Other Content of Meeting:  Time spent providing supportive listening  Returned to room to revisit code status following meeting, patient continues to express desire for full code. States \"I don't like to think about those things\". He expresses understanding that indecision means maximum intervention and at that time would defer to Adelita for decision making.     Time Involved in Meetin minutes, beginning at approximately  2pm and ending at approximately 2:45.     Randa Martinez PA-C   Palliative and Supportive Care  Clinic/Answering Service: [phone]  You can find me on Epic Secure Chat!   "

## 2025-03-29 LAB
ANION GAP SERPL CALCULATED.3IONS-SCNC: 5 MMOL/L (ref 4–13)
BASOPHILS # BLD AUTO: 0.01 THOUSANDS/ÂΜL (ref 0–0.1)
BASOPHILS NFR BLD AUTO: 0 % (ref 0–1)
BUN SERPL-MCNC: 31 MG/DL (ref 5–25)
CALCIUM SERPL-MCNC: 9 MG/DL (ref 8.4–10.2)
CHLORIDE SERPL-SCNC: 90 MMOL/L (ref 96–108)
CO2 SERPL-SCNC: 44 MMOL/L (ref 21–32)
CREAT SERPL-MCNC: 0.82 MG/DL (ref 0.6–1.3)
EOSINOPHIL # BLD AUTO: 0.03 THOUSAND/ÂΜL (ref 0–0.61)
EOSINOPHIL NFR BLD AUTO: 0 % (ref 0–6)
ERYTHROCYTE [DISTWIDTH] IN BLOOD BY AUTOMATED COUNT: 13 % (ref 11.6–15.1)
GFR SERPL CREATININE-BSD FRML MDRD: 91 ML/MIN/1.73SQ M
GLUCOSE SERPL-MCNC: 99 MG/DL (ref 65–140)
HCT VFR BLD AUTO: 34.5 % (ref 36.5–49.3)
HGB BLD-MCNC: 10.2 G/DL (ref 12–17)
IMM GRANULOCYTES # BLD AUTO: 0.02 THOUSAND/UL (ref 0–0.2)
IMM GRANULOCYTES NFR BLD AUTO: 0 % (ref 0–2)
LYMPHOCYTES # BLD AUTO: 0.75 THOUSANDS/ÂΜL (ref 0.6–4.47)
LYMPHOCYTES NFR BLD AUTO: 11 % (ref 14–44)
MCH RBC QN AUTO: 30 PG (ref 26.8–34.3)
MCHC RBC AUTO-ENTMCNC: 29.6 G/DL (ref 31.4–37.4)
MCV RBC AUTO: 102 FL (ref 82–98)
MONOCYTES # BLD AUTO: 0.94 THOUSAND/ÂΜL (ref 0.17–1.22)
MONOCYTES NFR BLD AUTO: 14 % (ref 4–12)
NEUTROPHILS # BLD AUTO: 5.14 THOUSANDS/ÂΜL (ref 1.85–7.62)
NEUTS SEG NFR BLD AUTO: 75 % (ref 43–75)
NRBC BLD AUTO-RTO: 0 /100 WBCS
PLATELET # BLD AUTO: 189 THOUSANDS/UL (ref 149–390)
PMV BLD AUTO: 10.5 FL (ref 8.9–12.7)
POTASSIUM SERPL-SCNC: 3.9 MMOL/L (ref 3.5–5.3)
RBC # BLD AUTO: 3.4 MILLION/UL (ref 3.88–5.62)
SODIUM SERPL-SCNC: 139 MMOL/L (ref 135–147)
WBC # BLD AUTO: 6.89 THOUSAND/UL (ref 4.31–10.16)

## 2025-03-29 PROCEDURE — 99232 SBSQ HOSP IP/OBS MODERATE 35: CPT | Performed by: FAMILY MEDICINE

## 2025-03-29 PROCEDURE — 85025 COMPLETE CBC W/AUTO DIFF WBC: CPT | Performed by: FAMILY MEDICINE

## 2025-03-29 PROCEDURE — 94660 CPAP INITIATION&MGMT: CPT

## 2025-03-29 PROCEDURE — 94760 N-INVAS EAR/PLS OXIMETRY 1: CPT

## 2025-03-29 PROCEDURE — 99232 SBSQ HOSP IP/OBS MODERATE 35: CPT | Performed by: INTERNAL MEDICINE

## 2025-03-29 PROCEDURE — 94640 AIRWAY INHALATION TREATMENT: CPT

## 2025-03-29 PROCEDURE — 80048 BASIC METABOLIC PNL TOTAL CA: CPT | Performed by: FAMILY MEDICINE

## 2025-03-29 PROCEDURE — 97530 THERAPEUTIC ACTIVITIES: CPT

## 2025-03-29 PROCEDURE — 97116 GAIT TRAINING THERAPY: CPT

## 2025-03-29 RX ADMIN — BUDESONIDE 0.5 MG: 0.5 INHALANT RESPIRATORY (INHALATION) at 07:36

## 2025-03-29 RX ADMIN — TORSEMIDE 40 MG: 20 TABLET ORAL at 08:33

## 2025-03-29 RX ADMIN — CLOTRIMAZOLE: 1 CREAM TOPICAL at 17:46

## 2025-03-29 RX ADMIN — FORMOTEROL FUMARATE DIHYDRATE 20 MCG: 20 SOLUTION RESPIRATORY (INHALATION) at 19:33

## 2025-03-29 RX ADMIN — CLOTRIMAZOLE: 1 CREAM TOPICAL at 08:33

## 2025-03-29 RX ADMIN — HEPARIN SODIUM 5000 UNITS: 5000 INJECTION INTRAVENOUS; SUBCUTANEOUS at 14:20

## 2025-03-29 RX ADMIN — PRAVASTATIN SODIUM 80 MG: 80 TABLET ORAL at 17:46

## 2025-03-29 RX ADMIN — HEPARIN SODIUM 5000 UNITS: 5000 INJECTION INTRAVENOUS; SUBCUTANEOUS at 22:20

## 2025-03-29 RX ADMIN — IPRATROPIUM BROMIDE 0.5 MG: 0.5 SOLUTION RESPIRATORY (INHALATION) at 13:23

## 2025-03-29 RX ADMIN — FLUTICASONE PROPIONATE 2 SPRAY: 50 SPRAY, METERED NASAL at 08:33

## 2025-03-29 RX ADMIN — HEPARIN SODIUM 5000 UNITS: 5000 INJECTION INTRAVENOUS; SUBCUTANEOUS at 05:46

## 2025-03-29 RX ADMIN — LEVALBUTEROL HYDROCHLORIDE 1.25 MG: 1.25 SOLUTION RESPIRATORY (INHALATION) at 07:03

## 2025-03-29 RX ADMIN — FORMOTEROL FUMARATE DIHYDRATE 20 MCG: 20 SOLUTION RESPIRATORY (INHALATION) at 07:36

## 2025-03-29 RX ADMIN — IPRATROPIUM BROMIDE 0.5 MG: 0.5 SOLUTION RESPIRATORY (INHALATION) at 01:20

## 2025-03-29 RX ADMIN — LEVALBUTEROL HYDROCHLORIDE 1.25 MG: 1.25 SOLUTION RESPIRATORY (INHALATION) at 01:20

## 2025-03-29 RX ADMIN — BUDESONIDE 0.5 MG: 0.5 INHALANT RESPIRATORY (INHALATION) at 19:33

## 2025-03-29 RX ADMIN — DOCUSATE SODIUM 100 MG: 100 CAPSULE, LIQUID FILLED ORAL at 08:33

## 2025-03-29 RX ADMIN — PREDNISONE 10 MG: 10 TABLET ORAL at 08:33

## 2025-03-29 RX ADMIN — POLYETHYLENE GLYCOL 3350 17 G: 17 POWDER, FOR SOLUTION ORAL at 08:33

## 2025-03-29 RX ADMIN — ASPIRIN 81 MG CHEWABLE TABLET 81 MG: 81 TABLET CHEWABLE at 08:33

## 2025-03-29 RX ADMIN — LEVALBUTEROL HYDROCHLORIDE 1.25 MG: 1.25 SOLUTION RESPIRATORY (INHALATION) at 13:23

## 2025-03-29 RX ADMIN — LEVALBUTEROL HYDROCHLORIDE 1.25 MG: 1.25 SOLUTION RESPIRATORY (INHALATION) at 19:33

## 2025-03-29 RX ADMIN — DOCUSATE SODIUM 100 MG: 100 CAPSULE, LIQUID FILLED ORAL at 17:46

## 2025-03-29 RX ADMIN — IPRATROPIUM BROMIDE 0.5 MG: 0.5 SOLUTION RESPIRATORY (INHALATION) at 19:33

## 2025-03-29 RX ADMIN — IPRATROPIUM BROMIDE 0.5 MG: 0.5 SOLUTION RESPIRATORY (INHALATION) at 07:03

## 2025-03-29 NOTE — ASSESSMENT & PLAN NOTE
resolved  Secondary to respiratory failure which is multifactorial related to COPD and CHF.  Probable hypoxia and hypercapnia component as well  Status appears to be improving with nocturnal BIPAP  Continue supportive measures and monitor

## 2025-03-29 NOTE — PLAN OF CARE
Problem: Potential for Falls  Goal: Patient will remain free of falls  Description: INTERVENTIONS:  - Educate patient/family on patient safety including physical limitations  - Instruct patient to call for assistance with activity   - Consult OT/PT to assist with strengthening/mobility   - Keep Call bell within reach  - Keep bed low and locked with side rails adjusted as appropriate  - Keep care items and personal belongings within reach  - Initiate and maintain comfort rounds  - Make Fall Risk Sign visible to staff  - Offer Toileting every  Hours, in advance of need  - Initiate/Maintain alarm  - Obtain necessary fall risk management equipment:   - Apply yellow socks and bracelet for high fall risk patients  - Consider moving patient to room near nurses station  Outcome: Progressing     Problem: PAIN - ADULT  Goal: Verbalizes/displays adequate comfort level or baseline comfort level  Description: Interventions:  - Encourage patient to monitor pain and request assistance  - Assess pain using appropriate pain scale  - Administer analgesics based on type and severity of pain and evaluate response  - Implement non-pharmacological measures as appropriate and evaluate response  - Consider cultural and social influences on pain and pain management  - Notify physician/advanced practitioner if interventions unsuccessful or patient reports new pain  Outcome: Progressing     Problem: INFECTION - ADULT  Goal: Absence or prevention of progression during hospitalization  Description: INTERVENTIONS:  - Assess and monitor for signs and symptoms of infection  - Monitor lab/diagnostic results  - Monitor all insertion sites, i.e. indwelling lines, tubes, and drains  - Monitor endotracheal if appropriate and nasal secretions for changes in amount and color  - [place] appropriate cooling/warming therapies per order  - Administer medications as ordered  - Instruct and encourage patient and family to use good hand hygiene technique  -  Identify and instruct in appropriate isolation precautions for identified infection/condition  Outcome: Progressing     Problem: SAFETY ADULT  Goal: Patient will remain free of falls  Description: INTERVENTIONS:  - Educate patient/family on patient safety including physical limitations  - Instruct patient to call for assistance with activity   - Consult OT/PT to assist with strengthening/mobility   - Keep Call bell within reach  - Keep bed low and locked with side rails adjusted as appropriate  - Keep care items and personal belongings within reach  - Initiate and maintain comfort rounds  - Make Fall Risk Sign visible to staff  - Offer Toileting every  Hours, in advance of need  - Initiate/Maintain alarm  - Obtain necessary fall risk management equipment:   - Apply yellow socks and bracelet for high fall risk patients  - Consider moving patient to room near nurses station  Outcome: Progressing

## 2025-03-29 NOTE — RESPIRATORY THERAPY NOTE
03/29/25 1415   Respiratory Assessment   Resp Comments Called to place pt on Bipap for a nap   Non-Invasive Information   O2 Interface Device Full face mask   Non-Invasive Ventilation Mode BiPAP   SpO2 99 %   $ Pulse Oximetry Spot Check Charge Completed   Non-Invasive Settings   IPAP (cm) 16 cm   EPAP (cm) 6 cm   Rate (Set) 16   FiO2 (%) 30   Non-Invasive Readings   Total Rate 22   Vt (mL) (Mech) 490   MV ([facility]) 11.8

## 2025-03-29 NOTE — PROGRESS NOTES
Heart Failure/ Pulmonary Hypertension Progress Note - Natalio Hartmann Jr. 69 y.o. male MRN: [id number]    Unit/Bed#: [facility] 431-01 Encounter: [id number]      Assessment/Plan:  69 y.o. male with a PMH of severe COPD, chronic hypoxic and hypercapnic respiratory failure on 4 L supplemental oxygen, cachexia, nonischemic cardiomyopathy, HFrEF --   who presents to the ER 3-14-25 with worsening shortness of breath,  increasingly lethargic feeling, and fatigue.  On day of admit, he was visibly short of breath slumped in the chair noted by his wife and brought to the ER.   In the ED he was noted to be hypoxic and hypercapnic requiring BiPAP.   BNP on admit>1000 (higher than priors).  CXR showed pulmonary congestion.  Unable to wean off Bipap--admitted to MICU.   Heart failure team consulted.   Transfered to floor 3-22-25.     # Acute on Chronic Respiratory Failure with hypoxia and hypercapnia  # COPD exacerbation  -Former smoker; 105 pack years, quit in 2012.   -Multiple hospitalizations with acute COPD exacerbations   -Required BiPAP in the ER--CO2 on admit 140--> down to 81 3/19   -Multifactorial with COPD and CHF exacerbation contributing (COPD > CHF) +/- PNA  -treatment of COPD exacerbation per pulmonary / primary team  -completed treatment for possible PNA with ceftriaxone x 7 days  -oxygen requirement down to 4L NC over the last 2 days     # HFrEF - acute on chronic, LVEF 25%, NYHA III, Stage C/D  Etiology:  Nonischemic cardiomyopathy.  Possible dyssynchrony from chronic LBBB.  Despite QRS only being 120 ms, echo shows significant dyssynchrony.      Studies- personally reviewed by me     TTE 3/16/2025: LVEF 25%.  LVIDD 6.1 cm.  RV moderately dilated with normal normal systolic function.  Severe left atrial enlargement.  Mild TR.  RVSP 42 mmHg.  Mild MR.  RAP 15 mmHg.  No pericardial effusion.  TTE 9/6/24: LVEF 25%  TTE 5/1/23: LVEF 20%. Severe global hypokinesis with regional variation. RV cavity size normal, systolic  function normal. Trace MR, TR.   TTE 2/21/23: LVEF 30%. LVIDd 6.6cm. severe global hypokinesis. Grade I DD. RV cavity size and systolic function normal. Mild TR.   TTE 2/12/2023: LVEF 20-25%.  Severe global hypokinesis with regional variation.  Grade 1 DD.  Abnormal septal motion consistent with LBBB.  RV cavity mildly dilated, normal systolic function.  Mild MR, mild TR.  RVSP 38.  Dilated IVC.  TTE 8/26/2022: LVEF 40%.  RV cavity mildly dilated.  Systolic function normal.  Mild MR, TR.  Normal IVC.     Neurohormonal Blockade: limited by low BP  --Beta-Blocker:   --ACEi, ARB or ARNi:    (or SVR reduction)  --Aldosterone Receptor Blocker:  --SGLT2-I:      Volume management:  --Home Diuretic: Torsemide 40 mg alternating with 20 mg daily  --Inpatient diuretic: Furosemide 80 mg IV daily - transitioned to torsemide 40mg daily 3/25     Sudden Cardiac Death Risk Reduction:  --ICD: he does not have an ICD.   Severe lung disease with life expectancy < 1 year.     # Metabolic Encephalopathy (HCC)  - on admit likely d/t Hypercapnia, hypoxia     # Nonobstructive CAD  Has coronary calcium on CT  - on aspirin  - on pravastatin     # Hyperlipidemia  - on pravastatin     # KEVIN   On BiPAP nightly     # Cachexia  -BMI 16     # GOC.    -family meeting held 3/29. Palliative care team input noted. Patient remains full code and wants to try to go home.      Today's Plan:  Euvolemic on exam today   Continue torsemide 40mg daily   GDMT limited by low blood pressure  PT/out of bed today and see how he does      Subjective:   Patient seen and examined.  No significant events overnight.      Review of Systems   Constitutional:  Negative for chills and fever.   Respiratory:  Positive for shortness of breath. Negative for chest tightness.    Cardiovascular:  Negative for chest pain and leg swelling.   Gastrointestinal:  Negative for abdominal distention, nausea and vomiting.   Neurological:  Negative for dizziness and light-headedness.     "    Objective:   Intake/ Output: 220/900  Weight: 99 lbs bedscale  Tele: sinus, NSVT up to 3 beats    Vitals: Blood pressure 117/68, pulse 77, temperature 98.5 °F (36.9 °C), temperature source Axillary, resp. rate 18, height 5' 2\" (1.575 m), weight 44.9 kg (99 lb), SpO2 96%., Body mass index is 18.11 kg/m²., I/O last 3 completed shifts:  In: 220 [P.O.:220]  Out: 1100 [Urine:1100]  No intake/output data recorded.  Wt Readings from Last 3 Encounters:   03/29/25 44.9 kg (99 lb)   02/21/25 44.3 kg (97 lb 11.2 oz)   09/06/24 44.9 kg (99 lb)       Intake/Output Summary (Last 24 hours) at 3/29/2025 0924  Last data filed at 3/28/2025 1905  Gross per 24 hour   Intake 120 ml   Output 900 ml   Net -780 ml     I/O last 3 completed shifts:  In: 220 [P.O.:220]  Out: 1100 [Urine:1100]      Physical Exam:  Vitals:    03/29/25 0355 03/29/25 0543 03/29/25 0703 03/29/25 0732   BP:    117/68   BP Location:    Right arm   Pulse:    77   Resp:    18   Temp:    98.5 °F (36.9 °C)   TempSrc:    Axillary   SpO2: 97%  97% 96%   Weight:  44.9 kg (99 lb)     Height:           GEN: Natalio Hartmann  ill-appearing, cachectic, in mild respiratory distress, pleasant and cooperative   HEENT: NC/AT, moist mucosa, anicteric sclerae; extraocular muscles intact  NECK: supple, no carotid bruits   HEART: regular rhythm, normal S1 and S2, no murmurs, clicks, gallops or rubs, JVP is nonelevated    LUNGS: Decreased breath sounds bilaterally; no wheezes, rales, or rhonchi   ABDOMEN: normal bowel sounds, soft, no tenderness, no distention  EXTREMITIES: peripheral pulses normal; no clubbing, cyanosis, or edema  NEURO: no focal findings   SKIN: normal without suspicious lesions on exposed skin      Current Facility-Administered Medications:     acetaminophen (TYLENOL) tablet 650 mg, 650 mg, Oral, Q6H PRN, Richelle Martines MD    albuterol inhalation solution 2.5 mg, 2.5 mg, Nebulization, Q4H PRN, Alessia Gauthier MD, 2.5 mg at 03/27/25 5596    aluminum-magnesium " hydroxide-simethicone (MAALOX) oral suspension 30 mL, 30 mL, Oral, Q6H PRN, Richelle Martines MD    aspirin chewable tablet 81 mg, 81 mg, Oral, Daily, Richelle Martines MD, 81 mg at 03/29/25 0833    budesonide (PULMICORT) inhalation solution 0.5 mg, 0.5 mg, Nebulization, Q12H, Richelle Martines MD, 0.5 mg at 03/29/25 0736    [Transfer Hold] chlorhexidine (PERIDEX) 0.12 % oral rinse 15 mL, 15 mL, Mouth/Throat, Q12H GABE, Nguyễn Liu DO, 15 mL at 03/22/25 0932    clotrimazole (LOTRIMIN) 1 % cream, , Topical, BID, Alessia Gauthier MD, Given at 03/29/25 0833    docusate sodium (COLACE) capsule 100 mg, 100 mg, Oral, BID, Richelle Martines MD, 100 mg at 03/29/25 0833    fluticasone (FLONASE) 50 mcg/act nasal spray 2 spray, 2 spray, Each Nare, Daily, Constantin Moreira MD, 2 spray at 03/29/25 0833    formoterol (PERFOROMIST) nebulizer solution 20 mcg, 20 mcg, Nebulization, Q12H, Richelle Martines MD, 20 mcg at 03/29/25 0736    heparin (porcine) subcutaneous injection 5,000 Units, 5,000 Units, Subcutaneous, Q8H GABE, Richelle Martines MD, 5,000 Units at 03/29/25 0546    ipratropium (ATROVENT) 0.02 % inhalation solution 0.5 mg, 0.5 mg, Nebulization, Q6H, Alessia Gauthier MD, 0.5 mg at 03/29/25 0703    levalbuterol (XOPENEX) inhalation solution 1.25 mg, 1.25 mg, Nebulization, Q6H, Alessia Gauthier MD, 1.25 mg at 03/29/25 0703    ondansetron (ZOFRAN) injection 4 mg, 4 mg, Intravenous, Q6H PRN, Richelle Martines MD    polyethylene glycol (MIRALAX) packet 17 g, 17 g, Oral, Daily, Richelle Martines MD, 17 g at 03/29/25 0833    pravastatin (PRAVACHOL) tablet 80 mg, 80 mg, Oral, Daily With Dinner, Richelle Martines MD, 80 mg at 03/28/25 1747    predniSONE tablet 10 mg, 10 mg, Oral, Daily, Richelle Martines MD, 10 mg at 03/29/25 0833    torsemide (DEMADEX) tablet 40 mg, 40 mg, Oral, Daily, DELIA Dickinson, 40 mg at 03/29/25 0833      Labs & Results:        Results from last 7 days   Lab Units 03/29/25  0534 03/28/25  0447 03/27/25  0433   WBC Thousand/uL  6.89 7.12 7.93   HEMOGLOBIN g/dL 10.2* 10.2* 10.9*   HEMATOCRIT % 34.5* 33.1* 36.5   PLATELETS Thousands/uL 189 189 199         Results from last 7 days   Lab Units 03/29/25  0534 03/28/25  0447 03/27/25  0433 03/25/25  0458 03/24/25  0523   POTASSIUM mmol/L 3.9 3.4* 3.9   < > 3.1*   CHLORIDE mmol/L 90* 87* 88*   < > 87*   CO2 mmol/L 44* 44* 44*   < > 44*   BUN mg/dL 31* 35* 30*   < > 30*   CREATININE mg/dL 0.82 0.78 0.89   < > 0.70   CALCIUM mg/dL 9.0 8.6 8.7   < > 8.7   ALK PHOS U/L  --   --   --   --  40   ALT U/L  --   --   --   --  22   AST U/L  --   --   --   --  23    < > = values in this interval not displayed.           Thank you for the opportunity to participate in the care of this patient.    An Bautista MD  Advanced Heart Failure and Mechanical Circulatory Support  [facility]

## 2025-03-29 NOTE — ASSESSMENT & PLAN NOTE
Wt Readings from Last 3 Encounters:   03/29/25 44.9 kg (99 lb)   02/21/25 44.3 kg (97 lb 11.2 oz)   09/06/24 44.9 kg (99 lb)   Known history of nonischemic cardiomyopathy with LVEF 25%  Presents with worsening shortness of breath volume overload state noted  Admitted to ICU, volume status was improving and patient deemed appropriate for discharge to the floor on 3/22  transitioned to PO diuretics on 3/25- torsemide 40 mg daily  Follow I's/O, daily weight, and monitor

## 2025-03-29 NOTE — RESPIRATORY THERAPY NOTE
03/29/25 0703   Respiratory Assessment   Bilateral Breath Sounds Diminished   Resp Comments No resp complaints.   Oxygen Therapy/Pulse Ox   O2 Device Nasal cannula   O2 Flow Rate (L/min) 4 L/min   SpO2 97 %   SpO2 Activity At Rest   $ Pulse Oximetry Spot Check Charge Completed

## 2025-03-29 NOTE — PHYSICAL THERAPY NOTE
"                                                                                  PHYSICAL THERAPY NOTE          Patient Name: Natalio Hartmann Jr.  Today's Date: 3/29/2025         03/29/25 1039   PT Last Visit   PT Visit Date 03/29/25   Note Type   Note Type Treatment   Pain Assessment   Pain Assessment Tool 0-10   Pain Score No Pain   Restrictions/Precautions   Weight Bearing Precautions Per Order No   Other Precautions Cognitive;Chair Alarm;Bed Alarm;Telemetry;Multiple lines;Fall Risk;Visual impairment  (\"sees shadows only\", 2L02 at rest, 4 L with mobiltiy)   General   Chart Reviewed Yes   Response to Previous Treatment Patient with no complaints from previous session.   Family/Caregiver Present Yes  (wife)   Cognition   Overall Cognitive Status Impaired   Arousal/Participation Cooperative   Attention Attends with cues to redirect   Orientation Level Oriented to person;Oriented to place;Oriented to situation;Disoriented to time   Following Commands Follows one step commands without difficulty   Comments overall pleasant   Subjective   Subjective pt states he wants to go home   Bed Mobility   Supine to Sit 5  Supervision   Additional items HOB elevated;Increased time required;Verbal cues   Sit to Supine Unable to assess   Transfers   Sit to Stand 5  Supervision   Additional items Increased time required;Verbal cues   Stand to Sit 5  Supervision   Additional items Increased time required;Verbal cues   Additional Comments c RW; x2   Ambulation/Elevation   Gait pattern Improper Weight shift;Decreased foot clearance;Short stride;Excessively slow   Gait Assistance 4  Minimal assist   Additional items Assist x 1;Verbal cues  (CGA; chair follow of 2nd for rest breaks)   Assistive Device Rolling walker   Distance 60'  (declined further ambulation)   Stair Management Assistance 4  Minimal assist   Additional items Assist x 1;Verbal cues;Increased time required  (CGA)   Stair Management Technique Two rails;Alternating " pattern;Foreward;Reciprocal   Number of Stairs 3  (limited by O2 line)   Ambulation/Elevation Additional Comments with ambulation on 3L, pt SPO2 decreased to 83%. increased seated rest time with focus on pursed lipped and diaphragmatic breathing. unable to improve without increasing O2 to 4L. pt wears 4L at home at baseline   Balance   Static Sitting Fair   Dynamic Sitting Fair -   Static Standing Fair -   Dynamic Standing Poor +   Ambulatory Poor +   Endurance Deficit   Endurance Deficit Yes   Endurance Deficit Description hypoxia   Activity Tolerance   Activity Tolerance Treatment limited secondary to medical complications (Comment)  (hypoxia)   Nurse Made Aware yes   Assessment   Prognosis Fair   Problem List Decreased strength;Decreased endurance;Impaired balance;Decreased mobility;Decreased cognition;Impaired judgement;Decreased safety awareness;Impaired vision   Assessment Pt agreeable to participate in PT session. Pt performed functional mobility as outlined above. Pt is able to mobilize with CGA at most while using RW. He has baseline vision impairments which hinder his mobility within a new environment. Extensive time spent with pt and wife education on breathing techniques, energy conservation, and dc planning. Pt expressed his desire to go home on several occasions. Educated him that rehab would help with his endurance as this is his main limitation however also discussed options of home dc. Educated pt and wife if he is to go home, he should not be alone, O2 use and monitoring, and HHPT. Wife works 3 hours a day/3 days a week but states she can quit her job as needed. Son (17yo) is also home and can likely stay with pt. Several times throughout session, pt asking therapist to increased O2 during session despite him satting >88. Educated on consequences of unnecessary O2 supplementation and risk with COPD.  Educated him on diaphragmatic and pursed lipped breathing-both which improved his SPO2 levels when  dropping with mobility. Pt left seated in chair with chair alarm, call bell, phone, and all personal needs within reach. Pt will continue to benefit from skilled acute care PT to further address their functional mobility limitations.   Barriers to Discharge Inaccessible home environment;Decreased caregiver support   Goals   Patient Goals to go home   STG Expiration Date 04/03/25   PT Treatment Day 3   Plan   Treatment/Interventions Functional transfer training;Elevations;LE strengthening/ROM;Therapeutic exercise;Endurance training;Cognitive reorientation;Patient/family training;Equipment eval/education;Bed mobility;Gait training;Spoke to nursing;Spoke to case management;OT;Family   Progress Slow progress, decreased activity tolerance   PT Frequency 3-5x/wk   Discharge Recommendation   Rehab Resource Intensity Level, PT II (Moderate Resource Intensity)  (please see assessment. if pt would like to go home, recommend he is not home alone, gets O2 monitoring, and HHPT)   Equipment Recommended Walker   Walker Package Recommended Wheeled walker   Additional Comments home 02-has PTA   AM-PAC Basic Mobility Inpatient   Turning in Flat Bed Without Bedrails 3   Lying on Back to Sitting on Edge of Flat Bed Without Bedrails 3   Moving Bed to Chair 3   Standing Up From Chair Using Arms 3   Walk in Room 3   Climb 3-5 Stairs With Railing 3   Basic Mobility Inpatient Raw Score 18   Basic Mobility Standardized Score 41.05   [facility] Highest Level Of Mobility   JH-HLM Goal 6: Walk 10 steps or more   JH-HLM Achieved 7: Walk 25 feet or more   Noe Zuñiga, PT, DPT, NCS

## 2025-03-29 NOTE — ASSESSMENT & PLAN NOTE
Malnutrition Findings:   Adult Malnutrition type: Chronic illness  Adult Degree of Malnutrition: Other severe protein calorie malnutrition  Malnutrition Characteristics: Fat loss, Muscle loss              360 Statement: severe malnutrition r/t chronic illness as evidenced by severe muscle loss around clavicles and shoulders, moderate-severe muscle loss in temples, severe buccal fat pad loss, apparent depression between ribs suggesting severe fat loss. Treatment: oral diet and oral nutrition supplements  BMI Findings:         Body mass index is 18.11 kg/m².

## 2025-03-29 NOTE — PLAN OF CARE
Problem: PHYSICAL THERAPY ADULT  Goal: Performs mobility at highest level of function for planned discharge setting.  See evaluation for individualized goals.  Description: Treatment/Interventions: ADL retraining, Functional transfer training, LE strengthening/ROM, Elevations, Therapeutic exercise, Endurance training, Cognitive reorientation, Patient/family training, Equipment eval/education, Gait training, Bed mobility, Spoke to MD, Spoke to nursing, Spoke to case management, OT (Pt was seen in conjunction w/ OT 2* unstable presentation; medical complexity; new precautions/ limitations + limited activity tolerance and regression from baseline functional mobility.)  Equipment Recommended: Walker       See flowsheet documentation for full assessment, interventions and recommendations.  Outcome: Progressing  Note: Prognosis: Fair  Problem List: Decreased strength, Decreased endurance, Impaired balance, Decreased mobility, Decreased cognition, Impaired judgement, Decreased safety awareness, Impaired vision  Assessment: Pt agreeable to participate in PT session. Pt performed functional mobility as outlined above. Pt is able to mobilize with CGA at most while using RW. He has baseline vision impairments which hinder his mobility within a new environment. Extensive time spent with pt and wife education on breathing techniques, energy conservation, and dc planning. Pt expressed his desire to go home on several occasions. Educated him that rehab would help with his endurance as this is his main limitation however also discussed options of home dc. Educated pt and wife if he is to go home, he should not be alone, O2 use and monitoring, and HHPT. Wife works 3 hours a day/3 days a week but states she can quit her job as needed. Son (19yo) is also home and can likely stay with pt. Several times throughout session, pt asking therapist to increased O2 during session despite him satting >88. Educated on consequences of  unnecessary O2 supplementation and risk with COPD.  Educated him on diaphragmatic and pursed lipped breathing-both which improved his SPO2 levels when dropping with mobility. Pt left seated in chair with chair alarm, call bell, phone, and all personal needs within reach. Pt will continue to benefit from skilled acute care PT to further address their functional mobility limitations.  Barriers to Discharge: Inaccessible home environment, Decreased caregiver support  Barriers to Discharge Comments: [place] home; alone at times  Rehab Resource Intensity Level, PT: II (Moderate Resource Intensity) (please see assessment. if pt would like to go home, recommend he is not home alone, gets O2 monitoring, and HHPT)    See flowsheet documentation for full assessment.

## 2025-03-29 NOTE — ASSESSMENT & PLAN NOTE
Malnutrition Findings:     Body mass index is 18.11 kg/m².   Encourage adequate protein and calorie intake

## 2025-03-29 NOTE — ASSESSMENT & PLAN NOTE
History of COPD and CHF, baseline O2 needs of 4 L via nasal cannula  Uses trilogy NIV at night and is on prednisone 10 mg daily chronically  Admitted to ICU for IV diuresis and respiratory support with BiPAP, steroids, nebulizers,  improved and tansferred to floor on 3/22  Most likely due to severe COPD  Pulmonology following  Pulmonary following, continues to need intermittent BiPAP versus high flow nasal cannula due to dyspnea   family meeting with palliative care, cardiology, pulmonology and patient's wife done 3/28, patient's goal is to go home; he wants to be discharged as soon as possible; need to actively participate in PT over the weekend to improve endurance.  Plan for another meeting on Monday, 3/31  Continue to encourage to work with PT

## 2025-03-29 NOTE — PROGRESS NOTES
Progress Note - Hospitalist   Name: Natalio Hartmann Jr. 69 y.o. male I MRN: [id number]  Unit/Bed#: [facility] 431-01 I Date of Admission: 3/14/2025   Date of Service: 3/29/2025 I Hospital Day: 15    Assessment & Plan  Acute on chronic respiratory failure with hypoxia and hypercapnia (HCC)  History of COPD and CHF, baseline O2 needs of 4 L via nasal cannula  Uses trilogy NIV at night and is on prednisone 10 mg daily chronically  Admitted to ICU for IV diuresis and respiratory support with BiPAP, steroids, nebulizers,  improved and tansferred to floor on 3/22  Most likely due to severe COPD  Pulmonology following  Pulmonary following, continues to need intermittent BiPAP versus high flow nasal cannula due to dyspnea   family meeting with palliative care, cardiology, pulmonology and patient's wife done 3/28, patient's goal is to go home; he wants to be discharged as soon as possible; need to actively participate in PT over the weekend to improve endurance.  Plan for another meeting on Monday, 3/31  Continue to encourage to work with PT  Acute on chronic systolic congestive heart failure (HCC)  Wt Readings from Last 3 Encounters:   03/29/25 44.9 kg (99 lb)   02/21/25 44.3 kg (97 lb 11.2 oz)   09/06/24 44.9 kg (99 lb)   Known history of nonischemic cardiomyopathy with LVEF 25%  Presents with worsening shortness of breath volume overload state noted  Admitted to ICU, volume status was improving and patient deemed appropriate for discharge to the floor on 3/22  transitioned to PO diuretics on 3/25- torsemide 40 mg daily  Follow I's/O, daily weight, and monitor  COPD exacerbation (HCC)  Monitored by pulmonology during hospitalization.  Low concern for acute exacerbation  Continue inhaler regimen and prednisone 10 mg daily  SIRS (systemic inflammatory response syndrome)  SIRS present on admission, patient initially covered for suspected pneumonia. Symptoms felt more likely to be related to CHF, and abx stopped after 4 days  Blood  cultures negative  Monitor off antibiotics  Obstructive sleep apnea  BiPAP as needed and at bedtime, patient appears to be tolerating  Metabolic encephalopathy  resolved  Secondary to respiratory failure which is multifactorial related to COPD and CHF.  Probable hypoxia and hypercapnia component as well  Status appears to be improving with nocturnal BIPAP  Continue supportive measures and monitor  Severe protein-calorie malnutrition (HCC)  Malnutrition Findings:     Body mass index is 18.11 kg/m².   Encourage adequate protein and calorie intake  Goals of care, counseling/discussion  Patient with multiple comorbidities and overall guarded prognosis.  Still continues Level 1 Full Code at this time as per family wishes  Palliative care team following, involvement appreciated  Hyperlipidemia  Resume Pravachol 80mg po qd  Malnutrition (HCC)  Malnutrition Findings:   Adult Malnutrition type: Chronic illness  Adult Degree of Malnutrition: Other severe protein calorie malnutrition  Malnutrition Characteristics: Fat loss, Muscle loss              360 Statement: severe malnutrition r/t chronic illness as evidenced by severe muscle loss around clavicles and shoulders, moderate-severe muscle loss in temples, severe buccal fat pad loss, apparent depression between ribs suggesting severe fat loss. Treatment: oral diet and oral nutrition supplements  BMI Findings:         Body mass index is 18.11 kg/m².   Palliative care by specialist      VTE Pharmacologic Prophylaxis: VTE Score: 8 High Risk (Score >/= 5) - Pharmacological DVT Prophylaxis Ordered: heparin. Sequential Compression Devices Ordered.    Mobility:   Basic Mobility Inpatient Raw Score: 17  JH-HLM Goal: 5: Stand one or more mins  JH-HLM Achieved: 7: Walk 25 feet or more  JH-HLM Goal NOT achieved. Continue with multidisciplinary rounding and encourage appropriate mobility to improve upon JH-HLM goals.    Patient Centered Rounds: I performed bedside rounds with nursing  staff today.   Discussions with Specialists or Other Care Team Provider: RN; cardiology    Education and Discussions with Family / Patient:  discussed with the patient and wife at bedside.     Current Length of Stay: 15 day(s)  Current Patient Status: Inpatient   Certification Statement: The patient will continue to require additional inpatient hospital stay due to need for diuresis, management of respiratory failure  Discharge Plan:pending discussion of goals of care, family meeting planned again on 3/31    Code Status: Level 1 - Full Code    Subjective   Patient seen and examined, he is in good mood today, eager to work with physical therapy.    Objective :  Temp:  [97.8 °F (36.6 °C)-99.2 °F (37.3 °C)] 98.3 °F (36.8 °C)  HR:  [71-84] 84  BP: (109-117)/(66-72) 117/69  Resp:  [17-18] 18  SpO2:  [95 %-99 %] 99 %  O2 Device: Nasal cannula  Nasal Cannula O2 Flow Rate (L/min):  [4 L/min] 4 L/min  FiO2 (%):  [35] 35    Body mass index is 18.11 kg/m².     Input and Output Summary (last 24 hours):     Intake/Output Summary (Last 24 hours) at 3/29/2025 1441  Last data filed at 3/29/2025 1323  Gross per 24 hour   Intake 360 ml   Output 1200 ml   Net -840 ml       Physical Exam  Constitutional:       General: He is in acute distress.      Appearance: He is ill-appearing.   HENT:      Head: Normocephalic and atraumatic.   Pulmonary:      Effort: Respiratory distress present.      Breath sounds: No wheezing, rhonchi or rales.      Comments: Diminished breath sounds b/l  Chest:      Chest wall: No tenderness.   Abdominal:      General: Abdomen is flat.      Palpations: Abdomen is soft.      Tenderness: There is no abdominal tenderness.   Musculoskeletal:      Right lower leg: No edema.      Left lower leg: No edema.   Skin:     General: Skin is warm and dry.      Capillary Refill: Capillary refill takes less than 2 seconds.   Neurological:      Mental Status: He is oriented to person, place, and time.            Lines/Drains:  Lines/Drains/Airways       Active Status       Name Placement date Placement time Site Days    External Urinary Catheter 03/21/25  1300  -- 8                      Telemetry:  Telemetry Orders (From admission, onward)               24 Hour Telemetry Monitoring  Continuous x 24 Hours (Telem)        Expiring   Question:  Reason for 24 Hour Telemetry  Answer:  Decompensated CHF- and any one of the following: continuous diuretic infusion or total diuretic dose >200 mg daily, associated electrolyte derangement (I.e. K < 3.0), inotropic drip (continuous infusion), hx of ventricular arrhythmia, or new EF < 35%                                Lab Results: I have reviewed the following results:   Results from last 7 days   Lab Units 03/29/25  0534   WBC Thousand/uL 6.89   HEMOGLOBIN g/dL 10.2*   HEMATOCRIT % 34.5*   PLATELETS Thousands/uL 189   SEGS PCT % 75   LYMPHO PCT % 11*   MONO PCT % 14*   EOS PCT % 0     Results from last 7 days   Lab Units 03/29/25  0534 03/25/25  0458 03/24/25  0523   SODIUM mmol/L 139   < > 137   POTASSIUM mmol/L 3.9   < > 3.1*   CHLORIDE mmol/L 90*   < > 87*   CO2 mmol/L 44*   < > 44*   BUN mg/dL 31*   < > 30*   CREATININE mg/dL 0.82   < > 0.70   ANION GAP mmol/L 5   < > 6   CALCIUM mg/dL 9.0   < > 8.7   ALBUMIN g/dL  --   --  3.6   TOTAL BILIRUBIN mg/dL  --   --  0.68   ALK PHOS U/L  --   --  40   ALT U/L  --   --  22   AST U/L  --   --  23   GLUCOSE RANDOM mg/dL 99   < > 116    < > = values in this interval not displayed.         Results from last 7 days   Lab Units 03/26/25  1703   POC GLUCOSE mg/dl 217*               Recent Cultures (last 7 days):               Last 24 Hours Medication List:     Current Facility-Administered Medications:     acetaminophen (TYLENOL) tablet 650 mg, Q6H PRN    albuterol inhalation solution 2.5 mg, Q4H PRN    aluminum-magnesium hydroxide-simethicone (MAALOX) oral suspension 30 mL, Q6H PRN    aspirin chewable tablet 81 mg, Daily    budesonide  (PULMICORT) inhalation solution 0.5 mg, Q12H    [Transfer Hold] chlorhexidine (PERIDEX) 0.12 % oral rinse 15 mL, Q12H GABE    clotrimazole (LOTRIMIN) 1 % cream, BID    docusate sodium (COLACE) capsule 100 mg, BID    fluticasone (FLONASE) 50 mcg/act nasal spray 2 spray, Daily    formoterol (PERFOROMIST) nebulizer solution 20 mcg, Q12H    heparin (porcine) subcutaneous injection 5,000 Units, Q8H GABE    ipratropium (ATROVENT) 0.02 % inhalation solution 0.5 mg, Q6H    levalbuterol (XOPENEX) inhalation solution 1.25 mg, Q6H    ondansetron (ZOFRAN) injection 4 mg, Q6H PRN    polyethylene glycol (MIRALAX) packet 17 g, Daily    pravastatin (PRAVACHOL) tablet 80 mg, Daily With Dinner    predniSONE tablet 10 mg, Daily    torsemide (DEMADEX) tablet 40 mg, Daily    Administrative Statements   Today, Patient Was Seen By: Alessia Gauthier MD      **Please Note: This note may have been constructed using a voice recognition system.**

## 2025-03-30 LAB
BASOPHILS # BLD AUTO: 0.02 THOUSANDS/ÂΜL (ref 0–0.1)
BASOPHILS NFR BLD AUTO: 0 % (ref 0–1)
BUN SERPL-MCNC: 27 MG/DL (ref 5–25)
CALCIUM SERPL-MCNC: 9 MG/DL (ref 8.4–10.2)
CHLORIDE SERPL-SCNC: 88 MMOL/L (ref 96–108)
CO2 SERPL-SCNC: >45 MMOL/L (ref 21–32)
CREAT SERPL-MCNC: 0.76 MG/DL (ref 0.6–1.3)
EOSINOPHIL # BLD AUTO: 0.04 THOUSAND/ÂΜL (ref 0–0.61)
EOSINOPHIL NFR BLD AUTO: 1 % (ref 0–6)
ERYTHROCYTE [DISTWIDTH] IN BLOOD BY AUTOMATED COUNT: 12.9 % (ref 11.6–15.1)
GFR SERPL CREATININE-BSD FRML MDRD: 94 ML/MIN/1.73SQ M
GLUCOSE SERPL-MCNC: 90 MG/DL (ref 65–140)
HCT VFR BLD AUTO: 32.8 % (ref 36.5–49.3)
HGB BLD-MCNC: 10.1 G/DL (ref 12–17)
IMM GRANULOCYTES # BLD AUTO: 0.01 THOUSAND/UL (ref 0–0.2)
IMM GRANULOCYTES NFR BLD AUTO: 0 % (ref 0–2)
LYMPHOCYTES # BLD AUTO: 0.78 THOUSANDS/ÂΜL (ref 0.6–4.47)
LYMPHOCYTES NFR BLD AUTO: 12 % (ref 14–44)
MAGNESIUM SERPL-MCNC: 2 MG/DL (ref 1.9–2.7)
MCH RBC QN AUTO: 30.9 PG (ref 26.8–34.3)
MCHC RBC AUTO-ENTMCNC: 30.8 G/DL (ref 31.4–37.4)
MCV RBC AUTO: 100 FL (ref 82–98)
MONOCYTES # BLD AUTO: 0.79 THOUSAND/ÂΜL (ref 0.17–1.22)
MONOCYTES NFR BLD AUTO: 12 % (ref 4–12)
NEUTROPHILS # BLD AUTO: 4.84 THOUSANDS/ÂΜL (ref 1.85–7.62)
NEUTS SEG NFR BLD AUTO: 75 % (ref 43–75)
NRBC BLD AUTO-RTO: 0 /100 WBCS
PLATELET # BLD AUTO: 187 THOUSANDS/UL (ref 149–390)
PMV BLD AUTO: 10.1 FL (ref 8.9–12.7)
POTASSIUM SERPL-SCNC: 4.2 MMOL/L (ref 3.5–5.3)
RBC # BLD AUTO: 3.27 MILLION/UL (ref 3.88–5.62)
SODIUM SERPL-SCNC: 138 MMOL/L (ref 135–147)
WBC # BLD AUTO: 6.48 THOUSAND/UL (ref 4.31–10.16)

## 2025-03-30 PROCEDURE — 80048 BASIC METABOLIC PNL TOTAL CA: CPT | Performed by: FAMILY MEDICINE

## 2025-03-30 PROCEDURE — 94640 AIRWAY INHALATION TREATMENT: CPT

## 2025-03-30 PROCEDURE — 99232 SBSQ HOSP IP/OBS MODERATE 35: CPT | Performed by: FAMILY MEDICINE

## 2025-03-30 PROCEDURE — 94660 CPAP INITIATION&MGMT: CPT

## 2025-03-30 PROCEDURE — 94664 DEMO&/EVAL PT USE INHALER: CPT

## 2025-03-30 PROCEDURE — 94760 N-INVAS EAR/PLS OXIMETRY 1: CPT

## 2025-03-30 PROCEDURE — 85025 COMPLETE CBC W/AUTO DIFF WBC: CPT | Performed by: FAMILY MEDICINE

## 2025-03-30 PROCEDURE — 99232 SBSQ HOSP IP/OBS MODERATE 35: CPT | Performed by: INTERNAL MEDICINE

## 2025-03-30 PROCEDURE — 83735 ASSAY OF MAGNESIUM: CPT | Performed by: FAMILY MEDICINE

## 2025-03-30 RX ORDER — LOSARTAN POTASSIUM 25 MG/1
12.5 TABLET ORAL DAILY
Status: DISCONTINUED | OUTPATIENT
Start: 2025-03-30 | End: 2025-03-30

## 2025-03-30 RX ORDER — LOSARTAN POTASSIUM 25 MG/1
12.5 TABLET ORAL DAILY
Status: DISCONTINUED | OUTPATIENT
Start: 2025-03-30 | End: 2025-04-04 | Stop reason: HOSPADM

## 2025-03-30 RX ADMIN — FLUTICASONE PROPIONATE 2 SPRAY: 50 SPRAY, METERED NASAL at 08:58

## 2025-03-30 RX ADMIN — LEVALBUTEROL HYDROCHLORIDE 1.25 MG: 1.25 SOLUTION RESPIRATORY (INHALATION) at 19:13

## 2025-03-30 RX ADMIN — PRAVASTATIN SODIUM 80 MG: 80 TABLET ORAL at 17:38

## 2025-03-30 RX ADMIN — IPRATROPIUM BROMIDE 0.5 MG: 0.5 SOLUTION RESPIRATORY (INHALATION) at 19:13

## 2025-03-30 RX ADMIN — DOCUSATE SODIUM 100 MG: 100 CAPSULE, LIQUID FILLED ORAL at 08:50

## 2025-03-30 RX ADMIN — TORSEMIDE 40 MG: 20 TABLET ORAL at 08:50

## 2025-03-30 RX ADMIN — HEPARIN SODIUM 5000 UNITS: 5000 INJECTION INTRAVENOUS; SUBCUTANEOUS at 21:16

## 2025-03-30 RX ADMIN — DOCUSATE SODIUM 100 MG: 100 CAPSULE, LIQUID FILLED ORAL at 17:38

## 2025-03-30 RX ADMIN — LEVALBUTEROL HYDROCHLORIDE 1.25 MG: 1.25 SOLUTION RESPIRATORY (INHALATION) at 13:16

## 2025-03-30 RX ADMIN — CLOTRIMAZOLE: 1 CREAM TOPICAL at 08:54

## 2025-03-30 RX ADMIN — BUDESONIDE 0.5 MG: 0.5 INHALANT RESPIRATORY (INHALATION) at 07:09

## 2025-03-30 RX ADMIN — LOSARTAN POTASSIUM 12.5 MG: 25 TABLET, FILM COATED ORAL at 12:52

## 2025-03-30 RX ADMIN — BUDESONIDE 0.5 MG: 0.5 INHALANT RESPIRATORY (INHALATION) at 19:13

## 2025-03-30 RX ADMIN — ASPIRIN 81 MG CHEWABLE TABLET 81 MG: 81 TABLET CHEWABLE at 08:50

## 2025-03-30 RX ADMIN — IPRATROPIUM BROMIDE 0.5 MG: 0.5 SOLUTION RESPIRATORY (INHALATION) at 07:09

## 2025-03-30 RX ADMIN — IPRATROPIUM BROMIDE 0.5 MG: 0.5 SOLUTION RESPIRATORY (INHALATION) at 02:28

## 2025-03-30 RX ADMIN — CLOTRIMAZOLE: 1 CREAM TOPICAL at 17:38

## 2025-03-30 RX ADMIN — HEPARIN SODIUM 5000 UNITS: 5000 INJECTION INTRAVENOUS; SUBCUTANEOUS at 06:01

## 2025-03-30 RX ADMIN — IPRATROPIUM BROMIDE 0.5 MG: 0.5 SOLUTION RESPIRATORY (INHALATION) at 13:16

## 2025-03-30 RX ADMIN — LEVALBUTEROL HYDROCHLORIDE 1.25 MG: 1.25 SOLUTION RESPIRATORY (INHALATION) at 02:28

## 2025-03-30 RX ADMIN — PREDNISONE 10 MG: 10 TABLET ORAL at 08:50

## 2025-03-30 RX ADMIN — HEPARIN SODIUM 5000 UNITS: 5000 INJECTION INTRAVENOUS; SUBCUTANEOUS at 13:36

## 2025-03-30 RX ADMIN — LEVALBUTEROL HYDROCHLORIDE 1.25 MG: 1.25 SOLUTION RESPIRATORY (INHALATION) at 07:09

## 2025-03-30 RX ADMIN — FORMOTEROL FUMARATE DIHYDRATE 20 MCG: 20 SOLUTION RESPIRATORY (INHALATION) at 07:59

## 2025-03-30 NOTE — RESTORATIVE TECHNICIAN NOTE
Restorative Technician Note      Patient Name: Natalio Hartmann JrBradley     Restorative Tech Visit Date: 03/30/25  Note Type: Mobility      Returned to ambulate the patient; pt's wife states he is too worn out to ambulate.

## 2025-03-30 NOTE — ASSESSMENT & PLAN NOTE
Malnutrition Findings:   Adult Malnutrition type: Chronic illness  Adult Degree of Malnutrition: Other severe protein calorie malnutrition  Malnutrition Characteristics: Fat loss, Muscle loss              360 Statement: severe malnutrition r/t chronic illness as evidenced by severe muscle loss around clavicles and shoulders, moderate-severe muscle loss in temples, severe buccal fat pad loss, apparent depression between ribs suggesting severe fat loss. Treatment: oral diet and oral nutrition supplements  BMI Findings:         Body mass index is 16.88 kg/m².

## 2025-03-30 NOTE — ASSESSMENT & PLAN NOTE
Wt Readings from Last 3 Encounters:   03/30/25 41.9 kg (92 lb 4.8 oz)   02/21/25 44.3 kg (97 lb 11.2 oz)   09/06/24 44.9 kg (99 lb)   Known history of nonischemic cardiomyopathy with LVEF 25%  Presents with worsening shortness of breath volume overload state noted  Admitted to ICU, volume status was improving and patient deemed appropriate for discharge to the floor on 3/22  transitioned to PO diuretics on 3/25- torsemide 40 mg daily  I's/O, daily weight

## 2025-03-30 NOTE — RESPIRATORY THERAPY NOTE
03/30/25 0709   Respiratory Assessment   Assessment Type Pre-treatment   General Appearance Alert;Awake   Bilateral Breath Sounds Diminished   Resp Comments Pt taken off Bipap, placed on nasal cannula   Oxygen Therapy/Pulse Ox   O2 Device Nasal cannula   O2 Therapy Oxygen humidified   Nasal Cannula O2 Flow Rate (L/min) 3 L/min   Calculated FIO2 (%) - Nasal Cannula 32   SpO2 99 %   SpO2 Activity At Rest   $ Pulse Oximetry Spot Check Charge Completed

## 2025-03-30 NOTE — ASSESSMENT & PLAN NOTE
History of COPD and CHF, baseline O2 needs of 4 L via nasal cannula  Uses trilogy NIV at night and is on prednisone 10 mg daily chronically  Admitted to ICU for IV diuresis and respiratory support with BiPAP, steroids, nebulizers,  improved and tansferred to floor on 3/22  Most likely due to severe COPD  Pulmonology following  Pulmonary following, continues to need intermittent BiPAP versus high flow nasal cannula due to dyspnea   family meeting with palliative care, cardiology, pulmonology and patient's wife done 3/28, patient's goal is to go home; he wants to be discharged as soon as possible; need to actively participate in PT over the weekend to improve endurance.  Plan for another meeting on Monday, 3/31  Continue to encourage to work with PT, he did have a session yesterday; he walked in the hallway, but noted decrease in SpO2 to 83%

## 2025-03-30 NOTE — PROGRESS NOTES
Heart Failure/ Pulmonary Hypertension Progress Note - Natalio Hartmann Jr. 69 y.o. male MRN: [id number]    Unit/Bed#: [facility] 431-01 Encounter: [id number]      Assessment/Plan:  69 y.o. male with a PMH of severe COPD, chronic hypoxic and hypercapnic respiratory failure on 4 L supplemental oxygen, cachexia, nonischemic cardiomyopathy, HFrEF --   who presents to the ER 3-14-25 with worsening shortness of breath,  increasingly lethargic feeling, and fatigue.  On day of admit, he was visibly short of breath slumped in the chair noted by his wife and brought to the ER.   In the ED he was noted to be hypoxic and hypercapnic requiring BiPAP.   BNP on admit>1000 (higher than priors).  CXR showed pulmonary congestion.  Unable to wean off Bipap--admitted to MICU.   Heart failure team consulted.   Transfered to floor 3-22-25.     # Acute on Chronic Respiratory Failure with hypoxia and hypercapnia  # COPD exacerbation  -Former smoker; 105 pack years, quit in 2012.   -Multiple hospitalizations with acute COPD exacerbations   -Required BiPAP in the ER--CO2 on admit 140--> down to 81 3/19   -Multifactorial with COPD and CHF exacerbation contributing (COPD > CHF) +/- PNA  -treatment of COPD exacerbation per pulmonary / primary team  -completed treatment for possible PNA with ceftriaxone x 7 days  -oxygen requirement down to 3-4L NC over the last 3 days     # HFrEF - acute on chronic, LVEF 25%, NYHA III, Stage C/D  Etiology:  Nonischemic cardiomyopathy.  Possible dyssynchrony from chronic LBBB.  Despite QRS only being 120 ms, echo shows significant dyssynchrony.      Studies- personally reviewed by me     TTE 3/16/2025: LVEF 25%.  LVIDD 6.1 cm.  RV moderately dilated with normal normal systolic function.  Severe left atrial enlargement.  Mild TR.  RVSP 42 mmHg.  Mild MR.  RAP 15 mmHg.  No pericardial effusion.  TTE 9/6/24: LVEF 25%  TTE 5/1/23: LVEF 20%. Severe global hypokinesis with regional variation. RV cavity size normal, systolic  function normal. Trace MR, TR.   TTE 2/21/23: LVEF 30%. LVIDd 6.6cm. severe global hypokinesis. Grade I DD. RV cavity size and systolic function normal. Mild TR.   TTE 2/12/2023: LVEF 20-25%.  Severe global hypokinesis with regional variation.  Grade 1 DD.  Abnormal septal motion consistent with LBBB.  RV cavity mildly dilated, normal systolic function.  Mild MR, mild TR.  RVSP 38.  Dilated IVC.  TTE 8/26/2022: LVEF 40%.  RV cavity mildly dilated.  Systolic function normal.  Mild MR, TR.  Normal IVC.     Neurohormonal Blockade: limited by low BP  --Beta-Blocker:   --ACEi, ARB or ARNi:    (or SVR reduction)  --Aldosterone Receptor Blocker:  --SGLT2-I:      Volume management:  --Home Diuretic: Torsemide 40 mg alternating with 20 mg daily  --Inpatient diuretic: Furosemide 80 mg IV daily - transitioned to torsemide 40mg daily 3/25     Sudden Cardiac Death Risk Reduction:  --ICD: he does not have an ICD.   Severe lung disease with life expectancy < 1 year.     # Metabolic Encephalopathy (HCC)  - on admit likely d/t Hypercapnia, hypoxia     # Nonobstructive CAD  Has coronary calcium on CT  - on aspirin  - on pravastatin     # Hyperlipidemia  - on pravastatin     # KEVIN   On BiPAP nightly     # Cachexia  -BMI 16     # GOC.    -family meeting held 3/29. Palliative care team input noted. Patient remains full code and wants to try to go home.      Today's Plan:  Euvolemic on exam today   Continue torsemide 40mg daily   GDMT has been limited low BP. Recent MAPs mostly in the 80s, will start low dose losartan and see how he tolerates.  PT/out of bed today and see how he does. Patient wishes to go home as of last family meeting 3/28.       Subjective:   Patient seen and examined.  No significant events overnight.      Review of Systems   Constitutional:  Negative for chills and fever.   Respiratory:  Positive for shortness of breath. Negative for chest tightness.    Cardiovascular:  Negative for chest pain and leg swelling.  "  Gastrointestinal:  Negative for abdominal distention, nausea and vomiting.   Neurological:  Negative for dizziness and light-headedness.        Objective:   Intake/ Output: 478/1100, -622  Weight: 92 lbs bedscale  Tele: sinus, NSVT    Vitals: Blood pressure 117/76, pulse 69, temperature 97.6 °F (36.4 °C), resp. rate 19, height 5' 2\" (1.575 m), weight 41.9 kg (92 lb 4.8 oz), SpO2 99%., Body mass index is 16.88 kg/m²., I/O last 3 completed shifts:  In: 478 [P.O.:478]  Out: 1100 [Urine:1100]  No intake/output data recorded.  Wt Readings from Last 3 Encounters:   03/30/25 41.9 kg (92 lb 4.8 oz)   02/21/25 44.3 kg (97 lb 11.2 oz)   09/06/24 44.9 kg (99 lb)       Intake/Output Summary (Last 24 hours) at 3/30/2025 0903  Last data filed at 3/29/2025 1921  Gross per 24 hour   Intake 118 ml   Output 1100 ml   Net -982 ml     I/O last 3 completed shifts:  In: 478 [P.O.:478]  Out: 1100 [Urine:1100]      Physical Exam:  Vitals:    03/30/25 0237 03/30/25 0600 03/30/25 0708 03/30/25 0709   BP: 106/66  117/76    BP Location:       Pulse: 70  69    Resp:   19    Temp: 98.1 °F (36.7 °C)  97.6 °F (36.4 °C)    TempSrc:       SpO2: 99%  100% 99%   Weight:  41.9 kg (92 lb 4.8 oz)     Height:           GEN: Natalio Hartmann Jr. ill-appearing, cachectic, in mild respiratory distress, pleasant and cooperative   HEENT: NC/AT, moist mucosa, anicteric sclerae; extraocular muscles intact  NECK: supple, no carotid bruits   HEART: regular rhythm, normal S1 and S2, no murmurs, clicks, gallops or rubs, JVP is nonelevated    LUNGS: Decreased breath sounds bilaterally; no wheezes, rales, or rhonchi   ABDOMEN: normal bowel sounds, soft, no tenderness, no distention  EXTREMITIES: peripheral pulses normal; no clubbing, cyanosis, or edema  NEURO: no focal findings   SKIN: normal without suspicious lesions on exposed skin      Current Facility-Administered Medications:     acetaminophen (TYLENOL) tablet 650 mg, 650 mg, Oral, Q6H PRN, Richelle Martines MD    " albuterol inhalation solution 2.5 mg, 2.5 mg, Nebulization, Q4H PRN, Alessia Gauthier MD, 2.5 mg at 03/27/25 2240    aluminum-magnesium hydroxide-simethicone (MAALOX) oral suspension 30 mL, 30 mL, Oral, Q6H PRN, Richelle Martines MD    aspirin chewable tablet 81 mg, 81 mg, Oral, Daily, Richelle Martines MD, 81 mg at 03/30/25 0850    budesonide (PULMICORT) inhalation solution 0.5 mg, 0.5 mg, Nebulization, Q12H, Richelle Martines MD, 0.5 mg at 03/30/25 0709    [Transfer Hold] chlorhexidine (PERIDEX) 0.12 % oral rinse 15 mL, 15 mL, Mouth/Throat, Q12H [facility], Nguyễn Liu DO, 15 mL at 03/22/25 0932    clotrimazole (LOTRIMIN) 1 % cream, , Topical, BID, Alessia Gauthier MD, Given at 03/30/25 0854    docusate sodium (COLACE) capsule 100 mg, 100 mg, Oral, BID, Richelle Martines MD, 100 mg at 03/30/25 0850    fluticasone (FLONASE) 50 mcg/act nasal spray 2 spray, 2 spray, Each Nare, Daily, Constantin Moreira MD, 2 spray at 03/30/25 0858    formoterol (PERFOROMIST) nebulizer solution 20 mcg, 20 mcg, Nebulization, Q12H, Richelle Martines MD, 20 mcg at 03/30/25 0759    heparin (porcine) subcutaneous injection 5,000 Units, 5,000 Units, Subcutaneous, Q8H [facility], Richelle Martines MD, 5,000 Units at 03/30/25 0601    ipratropium (ATROVENT) 0.02 % inhalation solution 0.5 mg, 0.5 mg, Nebulization, Q6H, Alessia Gauthier MD, 0.5 mg at 03/30/25 0709    levalbuterol (XOPENEX) inhalation solution 1.25 mg, 1.25 mg, Nebulization, Q6H, Alessia Gauthier MD, 1.25 mg at 03/30/25 0709    ondansetron (ZOFRAN) injection 4 mg, 4 mg, Intravenous, Q6H PRN, Richelle Martines MD    polyethylene glycol (MIRALAX) packet 17 g, 17 g, Oral, Daily, Richelle Martines MD, 17 g at 03/29/25 0833    pravastatin (PRAVACHOL) tablet 80 mg, 80 mg, Oral, Daily With Dinner, Richelle Martines MD, 80 mg at 03/29/25 1746    predniSONE tablet 10 mg, 10 mg, Oral, Daily, Richelle Martines MD, 10 mg at 03/30/25 0850    torsemide (DEMADEX) tablet 40 mg, 40 mg, Oral, Daily, DELIA Dickinson, 40 mg at 03/30/25  0850      Labs & Results:        Results from last 7 days   Lab Units 03/30/25  0607 03/29/25  0534 03/28/25  0447   WBC Thousand/uL 6.48 6.89 7.12   HEMOGLOBIN g/dL 10.1* 10.2* 10.2*   HEMATOCRIT % 32.8* 34.5* 33.1*   PLATELETS Thousands/uL 187 189 189         Results from last 7 days   Lab Units 03/30/25  0607 03/29/25  0534 03/28/25  0447 03/25/25  0458 03/24/25  0523   POTASSIUM mmol/L 4.2 3.9 3.4*   < > 3.1*   CHLORIDE mmol/L 88* 90* 87*   < > 87*   CO2 mmol/L >45* 44* 44*   < > 44*   BUN mg/dL 27* 31* 35*   < > 30*   CREATININE mg/dL 0.76 0.82 0.78   < > 0.70   CALCIUM mg/dL 9.0 9.0 8.6   < > 8.7   ALK PHOS U/L  --   --   --   --  40   ALT U/L  --   --   --   --  22   AST U/L  --   --   --   --  23    < > = values in this interval not displayed.           Thank you for the opportunity to participate in the care of this patient.    An Bautista MD  Advanced Heart Failure and Mechanical Circulatory Support  [facility]

## 2025-03-30 NOTE — RESTORATIVE TECHNICIAN NOTE
Restorative Technician Note      Patient Name: Natalio Hartmann JrBradley     Restorative Tech Visit Date: 03/30/25  Note Type: Mobility      Pt just finished eating and is now on a breathing treatment. Will delay ambulation for 30 minutes

## 2025-03-30 NOTE — PLAN OF CARE
Problem: Potential for Falls  Goal: Patient will remain free of falls  Description: INTERVENTIONS:  - Educate patient/family on patient safety including physical limitations  - Instruct patient to call for assistance with activity   - Consult OT/PT to assist with strengthening/mobility   - Keep Call bell within reach  - Keep bed low and locked with side rails adjusted as appropriate  - Keep care items and personal belongings within reach  - Initiate and maintain comfort rounds  - Make Fall Risk Sign visible to staff  - Offer Toileting every Hours, in advance of need  - Initiate/Maintain alarm  - Obtain necessary fall risk management equipment:   - Apply yellow socks and bracelet for high fall risk patients  - Consider moving patient to room near nurses station  Outcome: Progressing     Problem: PAIN - ADULT  Goal: Verbalizes/displays adequate comfort level or baseline comfort level  Description: Interventions:  - Encourage patient to monitor pain and request assistance  - Assess pain using appropriate pain scale  - Administer analgesics based on type and severity of pain and evaluate response  - Implement non-pharmacological measures as appropriate and evaluate response  - Consider cultural and social influences on pain and pain management  - Notify physician/advanced practitioner if interventions unsuccessful or patient reports new pain  Outcome: Progressing     Problem: INFECTION - ADULT  Goal: Absence or prevention of progression during hospitalization  Description: INTERVENTIONS:  - Assess and monitor for signs and symptoms of infection  - Monitor lab/diagnostic results  - Monitor all insertion sites, i.e. indwelling lines, tubes, and drains  - Monitor endotracheal if appropriate and nasal secretions for changes in amount and color  - [place] appropriate cooling/warming therapies per order  - Administer medications as ordered  - Instruct and encourage patient and family to use good hand hygiene technique  -  Identify and instruct in appropriate isolation precautions for identified infection/condition  Outcome: Progressing  Goal: Absence of fever/infection during neutropenic period  Description: INTERVENTIONS:  - Monitor WBC    Outcome: Progressing     Problem: SAFETY ADULT  Goal: Patient will remain free of falls  Description: INTERVENTIONS:  - Educate patient/family on patient safety including physical limitations  - Instruct patient to call for assistance with activity   - Consult OT/PT to assist with strengthening/mobility   - Keep Call bell within reach  - Keep bed low and locked with side rails adjusted as appropriate  - Keep care items and personal belongings within reach  - Initiate and maintain comfort rounds  - Make Fall Risk Sign visible to staff  - Offer Toileting every Hours, in advance of need  - Initiate/Maintain alarm  - Obtain necessary fall risk management equipment:   - Apply yellow socks and bracelet for high fall risk patients  - Consider moving patient to room near nurses station  Outcome: Progressing  Goal: Maintain or return to baseline ADL function  Description: INTERVENTIONS:  -  Assess patient's ability to carry out ADLs; assess patient's baseline for ADL function and identify physical deficits which impact ability to perform ADLs (bathing, care of mouth/teeth, toileting, grooming, dressing, etc.)  - Assess/evaluate cause of self-care deficits   - Assess range of motion  - Assess patient's mobility; develop plan if impaired  - Assess patient's need for assistive devices and provide as appropriate  - Encourage maximum independence but intervene and supervise when necessary  - Involve family in performance of ADLs  - Assess for home care needs following discharge   - Consider OT consult to assist with ADL evaluation and planning for discharge  - Provide patient education as appropriate  Outcome: Progressing  Goal: Maintains/Returns to pre admission functional level  Description: INTERVENTIONS:  -  Perform AM-PAC 6 Click Basic Mobility/ Daily Activity assessment daily.  - Set and communicate daily mobility goal to care team and patient/family/caregiver.   - Collaborate with rehabilitation services on mobility goals if consulted  - Perform Range of Motion  times a day.  - Reposition patient every  hours.  - Dangle patient  times a day  - Stand patient  times a day  - Ambulate patient  times a day  - Out of bed to chair  times a day   - Out of bed for meals  times a day  - Out of bed for toileting  - Record patient progress and toleration of activity level   Outcome: Progressing

## 2025-03-30 NOTE — PHYSICAL THERAPY NOTE
PHYSICAL THERAPY CANCEL NOTE          Patient Name: Natalio Hartmann Jr.  Today's Date: 3/30/2025       03/30/25 1300   PT Last Visit   PT Visit Date 03/30/25   Note Type   Note Type Cancelled Session   Cancel Reasons Other     Pt seen yesterday for PT. Mainly limited by endurance. Unable to see pt today due to time constraints. [facility] contacted and plans to mobilize pt this PM. Will continue to follow on PT caseload. Thank you.    Noe Zuñiga, PT, DPT, NCS

## 2025-03-30 NOTE — PROGRESS NOTES
Progress Note - Hospitalist   Name: Natalio Hartmann Jr. 69 y.o. male I MRN: [id number]  Unit/Bed#: [facility] 431-01 I Date of Admission: 3/14/2025   Date of Service: 3/30/2025 I Hospital Day: 16    Assessment & Plan  Acute on chronic respiratory failure with hypoxia and hypercapnia (HCC)  History of COPD and CHF, baseline O2 needs of 4 L via nasal cannula  Uses trilogy NIV at night and is on prednisone 10 mg daily chronically  Admitted to ICU for IV diuresis and respiratory support with BiPAP, steroids, nebulizers,  improved and tansferred to floor on 3/22  Most likely due to severe COPD  Pulmonology following  Pulmonary following, continues to need intermittent BiPAP versus high flow nasal cannula due to dyspnea   family meeting with palliative care, cardiology, pulmonology and patient's wife done 3/28, patient's goal is to go home; he wants to be discharged as soon as possible; need to actively participate in PT over the weekend to improve endurance.  Plan for another meeting on Monday, 3/31  Continue to encourage to work with PT, he did have a session yesterday; he walked in the hallway, but noted decrease in SpO2 to 83%  Acute on chronic systolic congestive heart failure (HCC)  Wt Readings from Last 3 Encounters:   03/30/25 41.9 kg (92 lb 4.8 oz)   02/21/25 44.3 kg (97 lb 11.2 oz)   09/06/24 44.9 kg (99 lb)   Known history of nonischemic cardiomyopathy with LVEF 25%  Presents with worsening shortness of breath volume overload state noted  Admitted to ICU, volume status was improving and patient deemed appropriate for discharge to the floor on 3/22  transitioned to PO diuretics on 3/25- torsemide 40 mg daily  I's/O, daily weight  COPD exacerbation (HCC)  Monitored by pulmonology during hospitalization.  Low concern for acute exacerbation  Continue inhaler regimen and prednisone 10 mg daily  SIRS (systemic inflammatory response syndrome)  SIRS present on admission, patient initially covered for suspected pneumonia.  Symptoms felt more likely to be related to CHF, and abx stopped after 4 days  Blood cultures negative  Monitor off antibiotics  Obstructive sleep apnea  BiPAP as needed and at bedtime, patient appears to be tolerating  Metabolic encephalopathy  resolved  Secondary to respiratory failure which is multifactorial related to COPD and CHF.  Probable hypoxia and hypercapnia component as well  Status appears to be improving with nocturnal BIPAP  Continue supportive measures and monitor  Severe protein-calorie malnutrition (HCC)  Malnutrition Findings:     Body mass index is 16.88 kg/m².   Encourage adequate protein and calorie intake  Goals of care, counseling/discussion  Patient with multiple comorbidities and overall guarded prognosis.  Still continues Level 1 Full Code at this time as per family wishes  Palliative care team following, involvement appreciated  Hyperlipidemia  Resume Pravachol 80mg po qd  Malnutrition (HCC)  Malnutrition Findings:   Adult Malnutrition type: Chronic illness  Adult Degree of Malnutrition: Other severe protein calorie malnutrition  Malnutrition Characteristics: Fat loss, Muscle loss              360 Statement: severe malnutrition r/t chronic illness as evidenced by severe muscle loss around clavicles and shoulders, moderate-severe muscle loss in temples, severe buccal fat pad loss, apparent depression between ribs suggesting severe fat loss. Treatment: oral diet and oral nutrition supplements  BMI Findings:         Body mass index is 16.88 kg/m².   Palliative care by specialist      VTE Pharmacologic Prophylaxis: VTE Score: 8 High Risk (Score >/= 5) - Pharmacological DVT Prophylaxis Ordered: heparin. Sequential Compression Devices Ordered.    Mobility:   Basic Mobility Inpatient Raw Score: 17  JH-HLM Goal: 5: Stand one or more mins  JH-HLM Achieved: 1: Laying in bed  JH-HLM Goal NOT achieved. Continue with multidisciplinary rounding and encourage appropriate mobility to improve upon JH-HLM  goals.    Patient Centered Rounds: I performed bedside rounds with nursing staff today.   Discussions with Specialists or Other Care Team Provider: RN; cardiology    Education and Discussions with Family / Patient:  discussed with the patient and wife at bedside.     Current Length of Stay: 16 day(s)  Current Patient Status: Inpatient   Certification Statement: The patient will continue to require additional inpatient hospital stay due to need for diuresis, management of respiratory failure  Discharge Plan:pending discussion of goals of care, family meeting planned again on 3/31    Code Status: Level 1 - Full Code    Subjective   Patient seen and examined, reports feeling tired and asking when can he go home    Objective :  Temp:  [97.1 °F (36.2 °C)-99.1 °F (37.3 °C)] 99.1 °F (37.3 °C)  HR:  [69-81] 81  BP: (106-117)/(66-76) 115/75  Resp:  [18-19] 18  SpO2:  [97 %-100 %] 99 %  O2 Device: Nasal cannula  Nasal Cannula O2 Flow Rate (L/min):  [3 L/min-4 L/min] 4 L/min    Body mass index is 16.88 kg/m².     Input and Output Summary (last 24 hours):     Intake/Output Summary (Last 24 hours) at 3/30/2025 1418  Last data filed at 3/30/2025 0900  Gross per 24 hour   Intake 298 ml   Output 300 ml   Net -2 ml       Physical Exam  Constitutional:       General: He is in acute distress.      Appearance: He is ill-appearing.   HENT:      Head: Normocephalic and atraumatic.   Pulmonary:      Effort: Respiratory distress present.      Breath sounds: No wheezing, rhonchi or rales.      Comments: Diminished breath sounds b/l  Chest:      Chest wall: No tenderness.   Abdominal:      General: Abdomen is flat.      Palpations: Abdomen is soft.      Tenderness: There is no abdominal tenderness.   Musculoskeletal:      Right lower leg: No edema.      Left lower leg: No edema.   Skin:     General: Skin is warm and dry.      Capillary Refill: Capillary refill takes less than 2 seconds.   Neurological:      Mental Status: He is oriented to  person, place, and time.           Lines/Drains:  Lines/Drains/Airways       Active Status       Name Placement date Placement time Site Days    External Urinary Catheter 03/21/25  1300  -- 9                      Telemetry:  Telemetry Orders (From admission, onward)               24 Hour Telemetry Monitoring  Continuous x 24 Hours (Telem)        Expiring   Question:  Reason for 24 Hour Telemetry  Answer:  Decompensated CHF- and any one of the following: continuous diuretic infusion or total diuretic dose >200 mg daily, associated electrolyte derangement (I.e. K < 3.0), inotropic drip (continuous infusion), hx of ventricular arrhythmia, or new EF < 35%                                Lab Results: I have reviewed the following results:   Results from last 7 days   Lab Units 03/30/25  0607   WBC Thousand/uL 6.48   HEMOGLOBIN g/dL 10.1*   HEMATOCRIT % 32.8*   PLATELETS Thousands/uL 187   SEGS PCT % 75   LYMPHO PCT % 12*   MONO PCT % 12   EOS PCT % 1     Results from last 7 days   Lab Units 03/30/25  0607 03/29/25  0534 03/25/25  0458 03/24/25  0523   SODIUM mmol/L 138 139   < > 137   POTASSIUM mmol/L 4.2 3.9   < > 3.1*   CHLORIDE mmol/L 88* 90*   < > 87*   CO2 mmol/L >45* 44*   < > 44*   BUN mg/dL 27* 31*   < > 30*   CREATININE mg/dL 0.76 0.82   < > 0.70   ANION GAP mmol/L  --  5   < > 6   CALCIUM mg/dL 9.0 9.0   < > 8.7   ALBUMIN g/dL  --   --   --  3.6   TOTAL BILIRUBIN mg/dL  --   --   --  0.68   ALK PHOS U/L  --   --   --  40   ALT U/L  --   --   --  22   AST U/L  --   --   --  23   GLUCOSE RANDOM mg/dL 90 99   < > 116    < > = values in this interval not displayed.         Results from last 7 days   Lab Units 03/26/25  1703   POC GLUCOSE mg/dl 217*               Recent Cultures (last 7 days):               Last 24 Hours Medication List:     Current Facility-Administered Medications:     acetaminophen (TYLENOL) tablet 650 mg, Q6H PRN    albuterol inhalation solution 2.5 mg, Q4H PRN    aluminum-magnesium  hydroxide-simethicone (MAALOX) oral suspension 30 mL, Q6H PRN    aspirin chewable tablet 81 mg, Daily    budesonide (PULMICORT) inhalation solution 0.5 mg, Q12H    [Transfer Hold] chlorhexidine (PERIDEX) 0.12 % oral rinse 15 mL, Q12H GABE    clotrimazole (LOTRIMIN) 1 % cream, BID    docusate sodium (COLACE) capsule 100 mg, BID    fluticasone (FLONASE) 50 mcg/act nasal spray 2 spray, Daily    formoterol (PERFOROMIST) nebulizer solution 20 mcg, Q12H    heparin (porcine) subcutaneous injection 5,000 Units, Q8H GABE    ipratropium (ATROVENT) 0.02 % inhalation solution 0.5 mg, Q6H    levalbuterol (XOPENEX) inhalation solution 1.25 mg, Q6H    losartan (COZAAR) tablet 12.5 mg, Daily    ondansetron (ZOFRAN) injection 4 mg, Q6H PRN    polyethylene glycol (MIRALAX) packet 17 g, Daily    pravastatin (PRAVACHOL) tablet 80 mg, Daily With Dinner    predniSONE tablet 10 mg, Daily    torsemide (DEMADEX) tablet 40 mg, Daily    Administrative Statements   Today, Patient Was Seen By: Alessia Gauthier MD      **Please Note: This note may have been constructed using a voice recognition system.**

## 2025-03-30 NOTE — ASSESSMENT & PLAN NOTE
Malnutrition Findings:     Body mass index is 16.88 kg/m².   Encourage adequate protein and calorie intake

## 2025-03-31 ENCOUNTER — PATIENT OUTREACH (OUTPATIENT)
Dept: CARDIOLOGY CLINIC | Facility: CLINIC | Age: 70
End: 2025-03-31

## 2025-03-31 LAB
BASOPHILS # BLD AUTO: 0.02 THOUSANDS/ÂΜL (ref 0–0.1)
BASOPHILS NFR BLD AUTO: 0 % (ref 0–1)
BUN SERPL-MCNC: 30 MG/DL (ref 5–25)
CALCIUM SERPL-MCNC: 9.1 MG/DL (ref 8.4–10.2)
CHLORIDE SERPL-SCNC: 86 MMOL/L (ref 96–108)
CO2 SERPL-SCNC: >45 MMOL/L (ref 21–32)
CREAT SERPL-MCNC: 0.87 MG/DL (ref 0.6–1.3)
EOSINOPHIL # BLD AUTO: 0.03 THOUSAND/ÂΜL (ref 0–0.61)
EOSINOPHIL NFR BLD AUTO: 1 % (ref 0–6)
ERYTHROCYTE [DISTWIDTH] IN BLOOD BY AUTOMATED COUNT: 12.8 % (ref 11.6–15.1)
GFR SERPL CREATININE-BSD FRML MDRD: 89 ML/MIN/1.73SQ M
GLUCOSE SERPL-MCNC: 177 MG/DL (ref 65–140)
HCT VFR BLD AUTO: 33.5 % (ref 36.5–49.3)
HGB BLD-MCNC: 9.8 G/DL (ref 12–17)
IMM GRANULOCYTES # BLD AUTO: 0.02 THOUSAND/UL (ref 0–0.2)
IMM GRANULOCYTES NFR BLD AUTO: 0 % (ref 0–2)
LYMPHOCYTES # BLD AUTO: 0.74 THOUSANDS/ÂΜL (ref 0.6–4.47)
LYMPHOCYTES NFR BLD AUTO: 12 % (ref 14–44)
MCH RBC QN AUTO: 30.2 PG (ref 26.8–34.3)
MCHC RBC AUTO-ENTMCNC: 29.3 G/DL (ref 31.4–37.4)
MCV RBC AUTO: 103 FL (ref 82–98)
MONOCYTES # BLD AUTO: 0.6 THOUSAND/ÂΜL (ref 0.17–1.22)
MONOCYTES NFR BLD AUTO: 10 % (ref 4–12)
NEUTROPHILS # BLD AUTO: 4.76 THOUSANDS/ÂΜL (ref 1.85–7.62)
NEUTS SEG NFR BLD AUTO: 77 % (ref 43–75)
NRBC BLD AUTO-RTO: 0 /100 WBCS
PLATELET # BLD AUTO: 173 THOUSANDS/UL (ref 149–390)
PMV BLD AUTO: 10.1 FL (ref 8.9–12.7)
POTASSIUM SERPL-SCNC: 4.1 MMOL/L (ref 3.5–5.3)
RBC # BLD AUTO: 3.24 MILLION/UL (ref 3.88–5.62)
SODIUM SERPL-SCNC: 137 MMOL/L (ref 135–147)
WBC # BLD AUTO: 6.17 THOUSAND/UL (ref 4.31–10.16)

## 2025-03-31 PROCEDURE — 99232 SBSQ HOSP IP/OBS MODERATE 35: CPT | Performed by: INTERNAL MEDICINE

## 2025-03-31 PROCEDURE — 94660 CPAP INITIATION&MGMT: CPT

## 2025-03-31 PROCEDURE — 80048 BASIC METABOLIC PNL TOTAL CA: CPT | Performed by: FAMILY MEDICINE

## 2025-03-31 PROCEDURE — 94640 AIRWAY INHALATION TREATMENT: CPT

## 2025-03-31 PROCEDURE — 97530 THERAPEUTIC ACTIVITIES: CPT

## 2025-03-31 PROCEDURE — 99232 SBSQ HOSP IP/OBS MODERATE 35: CPT | Performed by: PHYSICIAN ASSISTANT

## 2025-03-31 PROCEDURE — 85025 COMPLETE CBC W/AUTO DIFF WBC: CPT | Performed by: FAMILY MEDICINE

## 2025-03-31 PROCEDURE — 94760 N-INVAS EAR/PLS OXIMETRY 1: CPT

## 2025-03-31 PROCEDURE — 99232 SBSQ HOSP IP/OBS MODERATE 35: CPT | Performed by: FAMILY MEDICINE

## 2025-03-31 RX ORDER — ALBUMIN HUMAN 50 G/1000ML
25 SOLUTION INTRAVENOUS ONCE AS NEEDED
Status: DISCONTINUED | OUTPATIENT
Start: 2025-03-31 | End: 2025-04-04 | Stop reason: HOSPADM

## 2025-03-31 RX ORDER — LANOLIN ALCOHOL/MO/W.PET/CERES
1 CREAM (GRAM) TOPICAL
Status: DISCONTINUED | OUTPATIENT
Start: 2025-04-01 | End: 2025-04-04 | Stop reason: HOSPADM

## 2025-03-31 RX ORDER — ALBUMIN HUMAN 50 G/1000ML
12.5 SOLUTION INTRAVENOUS ONCE
Status: DISCONTINUED | OUTPATIENT
Start: 2025-03-31 | End: 2025-03-31

## 2025-03-31 RX ADMIN — POLYETHYLENE GLYCOL 3350 17 G: 17 POWDER, FOR SOLUTION ORAL at 08:45

## 2025-03-31 RX ADMIN — LOSARTAN POTASSIUM 12.5 MG: 25 TABLET, FILM COATED ORAL at 12:44

## 2025-03-31 RX ADMIN — CLOTRIMAZOLE: 1 CREAM TOPICAL at 17:46

## 2025-03-31 RX ADMIN — IPRATROPIUM BROMIDE 0.5 MG: 0.5 SOLUTION RESPIRATORY (INHALATION) at 19:44

## 2025-03-31 RX ADMIN — ASPIRIN 81 MG CHEWABLE TABLET 81 MG: 81 TABLET CHEWABLE at 08:45

## 2025-03-31 RX ADMIN — LEVALBUTEROL HYDROCHLORIDE 1.25 MG: 1.25 SOLUTION RESPIRATORY (INHALATION) at 08:05

## 2025-03-31 RX ADMIN — DOCUSATE SODIUM 100 MG: 100 CAPSULE, LIQUID FILLED ORAL at 17:46

## 2025-03-31 RX ADMIN — HEPARIN SODIUM 5000 UNITS: 5000 INJECTION INTRAVENOUS; SUBCUTANEOUS at 14:19

## 2025-03-31 RX ADMIN — IPRATROPIUM BROMIDE 0.5 MG: 0.5 SOLUTION RESPIRATORY (INHALATION) at 08:05

## 2025-03-31 RX ADMIN — BUDESONIDE 0.5 MG: 0.5 INHALANT RESPIRATORY (INHALATION) at 19:44

## 2025-03-31 RX ADMIN — PREDNISONE 10 MG: 10 TABLET ORAL at 08:45

## 2025-03-31 RX ADMIN — LEVALBUTEROL HYDROCHLORIDE 1.25 MG: 1.25 SOLUTION RESPIRATORY (INHALATION) at 19:44

## 2025-03-31 RX ADMIN — HEPARIN SODIUM 5000 UNITS: 5000 INJECTION INTRAVENOUS; SUBCUTANEOUS at 05:38

## 2025-03-31 RX ADMIN — HEPARIN SODIUM 5000 UNITS: 5000 INJECTION INTRAVENOUS; SUBCUTANEOUS at 21:15

## 2025-03-31 RX ADMIN — IPRATROPIUM BROMIDE 0.5 MG: 0.5 SOLUTION RESPIRATORY (INHALATION) at 03:07

## 2025-03-31 RX ADMIN — LEVALBUTEROL HYDROCHLORIDE 1.25 MG: 1.25 SOLUTION RESPIRATORY (INHALATION) at 13:56

## 2025-03-31 RX ADMIN — IPRATROPIUM BROMIDE 0.5 MG: 0.5 SOLUTION RESPIRATORY (INHALATION) at 13:56

## 2025-03-31 RX ADMIN — LEVALBUTEROL HYDROCHLORIDE 1.25 MG: 1.25 SOLUTION RESPIRATORY (INHALATION) at 03:07

## 2025-03-31 RX ADMIN — FLUTICASONE PROPIONATE 2 SPRAY: 50 SPRAY, METERED NASAL at 08:47

## 2025-03-31 RX ADMIN — CLOTRIMAZOLE: 1 CREAM TOPICAL at 08:48

## 2025-03-31 RX ADMIN — BUDESONIDE 0.5 MG: 0.5 INHALANT RESPIRATORY (INHALATION) at 08:05

## 2025-03-31 RX ADMIN — TORSEMIDE 40 MG: 20 TABLET ORAL at 08:45

## 2025-03-31 RX ADMIN — DOCUSATE SODIUM 100 MG: 100 CAPSULE, LIQUID FILLED ORAL at 08:45

## 2025-03-31 NOTE — PLAN OF CARE
Problem: Potential for Falls  Goal: Patient will remain free of falls  Description: INTERVENTIONS:  - Educate patient/family on patient safety including physical limitations  - Instruct patient to call for assistance with activity   - Consult OT/PT to assist with strengthening/mobility   - Keep Call bell within reach  - Keep bed low and locked with side rails adjusted as appropriate  - Keep care items and personal belongings within reach  - Initiate and maintain comfort rounds  - Make Fall Risk Sign visible to staff  - Offer Toileting every 2 Hours, in advance of need  - Initiate/Maintain bed/chair alarm  - Obtain necessary fall risk management equipment:   Problem: PAIN - ADULT  Goal: Verbalizes/displays adequate comfort level or baseline comfort level  Description: Interventions:  - Encourage patient to monitor pain and request assistance  - Assess pain using appropriate pain scale  - Administer analgesics based on type and severity of pain and evaluate response  - Implement non-pharmacological measures as appropriate and evaluate response  - Consider cultural and social influences on pain and pain management  - Notify physician/advanced practitioner if interventions unsuccessful or patient reports new pain  Outcome: Progressing     - Apply yellow socks and bracelet for high fall risk patients  - Consider moving patient to room near nurses station  Outcome: Progressing

## 2025-03-31 NOTE — PLAN OF CARE
Problem: OCCUPATIONAL THERAPY ADULT  Goal: Performs self-care activities at highest level of function for planned discharge setting.  See evaluation for individualized goals.  Description: Treatment Interventions: ADL retraining, Functional transfer training, UE strengthening/ROM, Endurance training, Cognitive reorientation, Patient/family training, Equipment evaluation/education, Compensatory technique education, Continued evaluation, Energy conservation, Activityengagement          See flowsheet documentation for full assessment, interventions and recommendations.   Outcome: Progressing  Note: Limitation: Decreased ADL status, Decreased UE strength, Decreased Safe judgement during ADL, Decreased endurance, Decreased self-care trans, Decreased high-level ADLs  Prognosis: Fair  Assessment: Pt was seen on 3/31/2025 to address ADL retraining, functional transfer training, and activity tolerance/endurance. Pt with active OT orders and activity orders. Pt deferring all mobility at this time due to fatigue, despite education and encouragement. Pt requires MAX A to brush hair supine in bed. Pt provided with and educated on HEP with red theraband. Pt instrucated to complete each exersice 3 times per day for UE strengthening for functional activity.  Pt is limited by decreased ADL status, functional transfers, functional mobility, and activity tolerance. Pt supine in bed at beginning of session and supine in bed at end of session with alarm set and all items within reach. The patient's raw score on the AM-PAC Daily Activity Inpatient Short Form is 12. A raw score of less than 19 suggests the patient may benefit from discharge to post-acute rehabilitation services. Please refer to the recommendation of the Occupational Therapist for safe discharge planning. Recommend Level II moderate intensity OT services at d/c to maximize pt function.     Rehab Resource Intensity Level, OT: II (Moderate Resource Intensity)

## 2025-03-31 NOTE — ASSESSMENT & PLAN NOTE
Acute on Chronic Hypoxic Hypercapnic respiratory failure 2/2 COPD/CHF exacerbation S/P MICU admission for IV diuresis, intermittent steroid dosing. Transferred to medical floor.     Not a candidate for at-home My-Airvo 3 due to nocturnal Trilogy usage  BIPAP 15/6 at night and as needed.  Morphine PRN, C/W Fan therapy  Family meeting today to discuss GOC, patient still wishes to go home but does not acknowledge risks associated with going home

## 2025-03-31 NOTE — PROGRESS NOTES
Patient listed on Advanced Heart Failure Census at [facility]. AMB Social Work Order has been entered for the purpose of chart review and rounds with the team.     Outpatient Advanced Heart Failure LCSW completed electronic chart review.   Heart Failure Team consulted with Palliative Care. Pt now on 4L O2 and plans to return home. Spouse at bedside, tearful and receiving support from Palliative Care.    LCSW plans to close referral once pt is discharged from the hospital setting if no outpatient social work needs are identified by that time.

## 2025-03-31 NOTE — ASSESSMENT & PLAN NOTE
Malnutrition Findings:     Body mass index is 17.01 kg/m².   Encourage adequate protein and calorie intake

## 2025-03-31 NOTE — PROGRESS NOTES
"Progress Note - Palliative Care   Name: Natalio Hartmann Jr. 69 y.o. male I MRN: [id number]  Unit/Bed#: [facility] 431-01 I Date of Admission: 3/14/2025   Date of Service: 3/31/2025 I Hospital Day: 17     Assessment & Plan  Palliative care by specialist  Palliative Diagnosis: end stage COPD    Goals:   Patient continues to have competing goals.  He does not find his quality of life in hospital to be acceptable, but also, does not wish to engage in any end of life cares or set any limits on cares.  Explicitly declines hospice.  This is similar to previous conversations our team (including myself) has had during his healthcare journey.  Nevertheless, he was more welcoming of our team than previously and if he continues to allow it I believe he would benefit from ongoing rapport building and Palliative Care support.    See ACP note dated 3/28 regarding family meeting  Palliative will follow for ongoing goals of care discussions as situation evolves.    Social Support:  Patient's support system: spouse, son  Supportive listening provided  Normalized experience of patient  Advocated for patient/family with interdisciplinary team    Care Coordination  Case discussed with IM provider, CM, HF, pulmonology    Follow-up  We appreciate the opportunity to participate in this patient's care.   We will continue to follow while admitted.  Patient is eligible for the palliative home program upon discharge  Please do not hesitate to contact our on-call provider through EPIC Secure Chat or contact [phone] should there be an acute change or other symptom control concerns.    Symptom Mgmt:  Returned to talk to Mr. Hartmann about using low dose opioids for breathlessness.  Discusses how \"morphine killed [his] mother\" and he declines to take \"any of those medications.\"   Explored somewhat as able but will respect patient's decision.    Please do not make any changes to symptom medications (opioid analgesics, nonopioid analgesics, antiemetics, etc) " "without first consulting the on-call Palliative Care provider for your specific campus; including after hours and on weekends. We are available 24/7, and can be contacted on Epic secure chat or at [phone].    Acute on chronic respiratory failure with hypoxia and hypercapnia (HCC)  Pulmonary following for end stage COPD  Patient continues to alternate between supplemental O2 and BiPAP, both of which he is accustomed to using at home  Experiencing pulmonary cachexia but has a good appetite  Continue fan for dyspnea  Patient has declined symptomatic treatment of dyspnea with opioids reporting \"morphine killed\" his mother  Goals of care, counseling/discussion  See ACP note dated 3/28.   Patient is not ready to forego further hospitalizations or medical treatment at this time  His primary goal is to return home, but willing to await rehab determinations. He is only open to [facility] or [facility] for STR.  Patient continues to request full code, full cares  Severe protein-calorie malnutrition (HCC)  Continues to have good appetite, picture consistent with pulmonary cachexia    Malnutrition Findings:   Adult Malnutrition type: Chronic illness  Adult Degree of Malnutrition: Other severe protein calorie malnutrition  Malnutrition Characteristics: Fat loss, Muscle loss                  360 Statement: severe malnutrition r/t chronic illness as evidenced by severe muscle loss around clavicles and shoulders, moderate-severe muscle loss in temples, severe buccal fat pad loss, apparent depression between ribs suggesting severe fat loss. Treatment: oral diet and oral nutrition supplements    BMI Findings:           Body mass index is 17.01 kg/m².     Acute on chronic systolic congestive heart failure (HCC)  Wt Readings from Last 3 Encounters:   03/31/25 42.2 kg (93 lb)   02/21/25 44.3 kg (97 lb 11.2 oz)   09/06/24 44.9 kg (99 lb)     Clear from heart failure perspective for discharge        Obstructive sleep apnea    COPD exacerbation " ([facility])    Interval history:       No events overnight. On Saturday patient was able to walk in the [place] and even do several steps which he enjoyed. No new complaints, eager to go home but willing to entertain idea of acute rehab if accepted.    MEDICATIONS / ALLERGIES:     all current active meds have been reviewed    No Known Allergies    OBJECTIVE:    Physical Exam  Physical Exam  Constitutional:       General: He is not in acute distress.     Appearance: He is ill-appearing.   HENT:      Head:      Comments: Temporal wasting  Eyes:      Conjunctiva/sclera: Conjunctivae normal.   Cardiovascular:      Rate and Rhythm: Normal rate.   Pulmonary:      Comments: Baseline increased work of breathing, on O2 via NC  Abdominal:      Tenderness: There is no guarding.   Musculoskeletal:         General: No swelling.   Skin:     General: Skin is warm and dry.   Neurological:      Mental Status: Mental status is at baseline.   Psychiatric:      Comments: Frustrated at times         Lab Results:   Results from last 7 days   Lab Units 03/31/25  0545 03/30/25  0607 03/29/25  0534   WBC Thousand/uL 6.17 6.48 6.89   HEMOGLOBIN g/dL 9.8* 10.1* 10.2*   HEMATOCRIT % 33.5* 32.8* 34.5*   PLATELETS Thousands/uL 173 187 189   SEGS PCT % 77* 75 75   MONO PCT % 10 12 14*   EOS PCT % 1 1 0     Results from last 7 days   Lab Units 03/31/25  0636 03/30/25  0607 03/29/25  0534   POTASSIUM mmol/L 4.1 4.2 3.9   CHLORIDE mmol/L 86* 88* 90*   CO2 mmol/L >45* >45* 44*   BUN mg/dL 30* 27* 31*   CREATININE mg/dL 0.87 0.76 0.82   CALCIUM mg/dL 9.1 9.0 9.0     Imaging Studies: reviewed pertinent studies   EKG, Pathology, and Other Studies: reviewed pertinent studies    Counseling / Coordination of Care    Total floor / unit time spent today 40 + minutes. Greater than 50% of total time was spent with the patient and / or family counseling and / or coordination of care. A description of the counseling / coordination of care: symptom assessment and  management, medication review, psychosocial support, chart review, lab review, goals of care, supportive listening, anticipatory guidance, and coordination with primary team, RN, CM, pulmonology, heart failure

## 2025-03-31 NOTE — PROGRESS NOTES
"Progress Note - Pulmonology   Name: Natalio Hartmann Jr. 69 y.o. male I MRN: [id number]  Unit/Bed#: [facility] 431-01 I Date of Admission: 3/14/2025   Date of Service: 3/31/2025 I Hospital Day: 17     Assessment & Plan  Acute on chronic respiratory failure with hypoxia and hypercapnia (HCC)  Acute on Chronic Hypoxic Hypercapnic respiratory failure 2/2 COPD/CHF exacerbation S/P MICU admission for IV diuresis, intermittent steroid dosing. Transferred to medical floor.     Not a candidate for at-home My-Airvo 3 due to nocturnal Trilogy usage  BIPAP 15/6 at night and as needed.  Morphine PRN, C/W Fan therapy  Family meeting today to discuss GOC, patient still wishes to go home but does not acknowledge risks associated with going home  COPD exacerbation (HCC)  Fully resolved at this point  Baseline FEV1 22%  Baseline O2 requirement 4L with nocutrnal Trilogy  Continue nebulized bronchodilators budesonide, formoterol  Pulmonary toileting  Continue with prednisone 10 mg daily (baseline outpt steroid dosing)  Obstructive sleep apnea  On Trilogy NIV at night at home  C/W BIPAP as above  Goals of care, counseling/discussion  Readdressed goals of care today while at bedside with patient's wife and patient. States that he wants to go home because he would be more comfortable in regards to the temperature (too hot in room trung) and being able to \"do more than he's doing now in bed\". Ultimately he stated that he wants to continue current management and will continue working with physical therapy to get stronger.    Family meeting 3/28 - concluded patient will work with therapy for 2 additional days but wants to go home on Monday morning 3/31.   Palliative care by specialist      24 Hour Events : No acute events overnight  Subjective : He says he is doing okay today and feels like he would be doing much better at home.  He continues to state that he wants to go home and is tired of being in the hospital.  He feels that he will do better at " home.  He does not believe he will be a fall risk at home because he does not get up to move very often.  He says he only gets up to go to the bathroom and he will be able to do that without difficulty at home.  He does not have any worsening respiratory symptoms at this time.    He worked with PT on Saturday and was able to ambulate with rolling walker and get up 3 steps.  He was too worn out to work with PT on Sunday.  His significant other was at bedside and agreed that it would be best to ensure he can get up and go to the bathroom on his own.    Objective :  Temp:  [98 °F (36.7 °C)-99.1 °F (37.3 °C)] 98 °F (36.7 °C)  HR:  [62-81] 62  BP: ()/(58-76) 93/58  Resp:  [18-20] 18  SpO2:  [97 %-100 %] 100 %  O2 Device: BiPAP  Nasal Cannula O2 Flow Rate (L/min):  [4 L/min] 4 L/min    Physical Exam  Vitals reviewed.   Constitutional:       Comments: Markedly cachectic   Cardiovascular:      Rate and Rhythm: Normal rate and regular rhythm.   Pulmonary:      Effort: Pulmonary effort is normal. No respiratory distress.      Comments: Diminished breath sounds throughout  Musculoskeletal:      Right lower leg: No edema.      Left lower leg: No edema.   Neurological:      Mental Status: He is alert.         Lab Results: I have reviewed the following results:   .     03/31/25  0545   WBC 6.17   HGB 9.8*   HCT 33.5*        ABG: No new results in last 24 hours.    Imaging Results Review: No pertinent imaging studies reviewed.  Other Study Results Review: No additional pertinent studies reviewed.  PFT Results Reviewed: reviewed

## 2025-03-31 NOTE — ASSESSMENT & PLAN NOTE
Malnutrition Findings:   Adult Malnutrition type: Chronic illness  Adult Degree of Malnutrition: Other severe protein calorie malnutrition  Malnutrition Characteristics: Fat loss, Muscle loss              360 Statement: severe malnutrition r/t chronic illness as evidenced by severe muscle loss around clavicles and shoulders, moderate-severe muscle loss in temples, severe buccal fat pad loss, apparent depression between ribs suggesting severe fat loss. Treatment: oral diet and oral nutrition supplements  BMI Findings:         Body mass index is 17.01 kg/m².

## 2025-03-31 NOTE — PROGRESS NOTES
Advanced Heart Failure / Pulmonary Hypertension Service Progress Note    Natalio Hartmann Jr. 69 y.o. male   MRN: [id number]  Unit/Bed#: [facility] 431-01; Encounter: [id number]    Assessment:  Principal Problem:    Acute on chronic respiratory failure with hypoxia and hypercapnia (HCC)  Active Problems:    Obstructive sleep apnea    Goals of care, counseling/discussion    Acute on chronic systolic congestive heart failure (HCC)    SIRS (systemic inflammatory response syndrome)    COPD exacerbation (HCC)    Hyperlipidemia    Malnutrition (HCC)    Palliative care by specialist    Metabolic encephalopathy    Severe protein-calorie malnutrition (HCC)    69 y.o. male with a PMH of severe COPD, chronic hypoxic and hypercapnic respiratory failure on 4 L supplemental oxygen, cachexia, nonischemic cardiomyopathy, HFrEF -- who presented to the ER 3-14-25 with worsening shortness of breath, increasingly lethargic feeling, and fatigue. On day of admit, he was visibly short of breath slumped in the chair noted by his wife and brought to the ER. In the ED he was noted to be hypoxic and hypercapnic requiring BiPAP. BNP on admit>1000 (higher than priors). CXR showed pulmonary congestion. Unable to wean off Bipap--admitted to MICU. Heart failure team consulted. Transfered to floor 3-22-25.     Subjective:   Patient seen and examined. No significant events overnight. Asking to go home.    Objective:   Intake/ Output: 600/950 (-350)  Weight: 93 lbs  Telemetry: reviewed  MAPs 70s    Today's Plan:  Continue torsemide 40 mg daily.  Euvolemic on exam today.  GDMT has been limited by low blood pressures.  Started on low-dose losartan yesterday, MAPs in the 70s.  Encourage ambulation with PT.  As of last family meeting on 3/28, patient wishes to go home and remain full code. Appreciate palliative care recommendations.   Will arrange for outpatient follow up when discharged. No contraindications for discharge from HF perspective.      Plan:  Acute on Chronic Respiratory Failure with hypoxia and hypercapnia  COPD exacerbation  -Former smoker; 105 pack years, quit in 2012.   -Multiple hospitalizations with acute COPD exacerbations   -Required BiPAP in the ER--CO2 on admit 140--> down to 81 3/19   -Multifactorial with COPD and CHF exacerbation contributing (COPD > CHF) +/- PNA  -treatment of COPD exacerbation per pulmonary / primary team  -completed treatment for possible PNA with ceftriaxone x 7 days  -oxygen requirement down to 3-4L NC over the last 3 days     HFrEF - acute on chronic, LVEF 25%, NYHA III, Stage C/D  Etiology:  Nonischemic cardiomyopathy.  Possible dyssynchrony from chronic LBBB.  Despite QRS only being 120 ms, echo shows significant dyssynchrony.      Studies     TTE 3/16/2025: LVEF 25%.  LVIDD 6.1 cm.  RV moderately dilated with normal normal systolic function.  Severe left atrial enlargement.  Mild TR.  RVSP 42 mmHg.  Mild MR.  RAP 15 mmHg.  No pericardial effusion.  TTE 9/6/24: LVEF 25%  TTE 5/1/23: LVEF 20%. Severe global hypokinesis with regional variation. RV cavity size normal, systolic function normal. Trace MR, TR.   TTE 2/21/23: LVEF 30%. LVIDd 6.6cm. severe global hypokinesis. Grade I DD. RV cavity size and systolic function normal. Mild TR.   TTE 2/12/2023: LVEF 20-25%.  Severe global hypokinesis with regional variation.  Grade 1 DD.  Abnormal septal motion consistent with LBBB.  RV cavity mildly dilated, normal systolic function.  Mild MR, mild TR.  RVSP 38.  Dilated IVC.  TTE 8/26/2022: LVEF 40%.  RV cavity mildly dilated.  Systolic function normal.  Mild MR, TR.  Normal IVC.     Neurohormonal Blockade: limited by low BP  --Beta-Blocker:   --ACEi, ARB or ARNi: losartan 12.5 mg QD    (or SVR reduction)  --Aldosterone Receptor Blocker:  --SGLT2-I:      Volume management:  --Home Diuretic: Torsemide 40 mg alternating with 20 mg daily  --Inpatient diuretic: Furosemide 80 mg IV daily - transitioned to torsemide 40mg  "daily 3/25     Sudden Cardiac Death Risk Reduction:  --ICD: he does not have an ICD.   Severe lung disease with life expectancy < 1 year.     # Metabolic Encephalopathy (HCC)  - on admit likely d/t Hypercapnia, hypoxia     # Nonobstructive CAD  Has coronary calcium on CT  - on aspirin  - on pravastatin     # Hyperlipidemia  - on pravastatin     # KEVIN   On BiPAP nightly     # Cachexia  -BMI 16     # GOC.    -family meeting held 3/29. Palliative care team input noted. Patient remains full code and wants to try to go home.     Vitals:   Blood pressure 114/59, pulse 74, temperature (!) 97.4 °F (36.3 °C), temperature source Oral, resp. rate 18, height 5' 2\" (1.575 m), weight 42.2 kg (93 lb), SpO2 99%.    Body mass index is 17.01 kg/m².    I/O last 3 completed shifts:  In: 718 [P.O.:718]  Out: 950 [Urine:950]    Wt Readings from Last 10 Encounters:   03/31/25 42.2 kg (93 lb)   02/21/25 44.3 kg (97 lb 11.2 oz)   09/06/24 44.9 kg (99 lb)   08/13/24 45.2 kg (99 lb 9.6 oz)   07/29/24 44.7 kg (98 lb 9.6 oz)   04/15/24 44.4 kg (97 lb 12.8 oz)   03/11/24 45 kg (99 lb 4.8 oz)   02/04/24 45.4 kg (100 lb)   01/22/24 46.7 kg (103 lb)   01/10/24 43.5 kg (96 lb)     Vitals:    03/31/25 0212 03/31/25 0600 03/31/25 0747 03/31/25 0805   BP: 93/58  114/59    BP Location:   Left arm    Pulse: 62  74    Resp: 18  18    Temp:   (!) 97.4 °F (36.3 °C)    TempSrc:   Oral    SpO2: 100%  99% 99%   Weight:  42.2 kg (93 lb)     Height:           Physical Exam  Constitutional:       Appearance: He is ill-appearing.   HENT:      Head: Normocephalic.      Nose: Nose normal.   Eyes:      Conjunctiva/sclera: Conjunctivae normal.   Neck:      Vascular: No JVD.   Cardiovascular:      Rate and Rhythm: Normal rate and regular rhythm.   Pulmonary:      Comments: Tachypneic   Musculoskeletal:      Cervical back: Neck supple.      Right lower leg: No edema.      Left lower leg: No edema.   Skin:     General: Skin is warm and dry.   Neurological:      General: " No focal deficit present.      Mental Status: He is alert and oriented to person, place, and time.   Psychiatric:         Mood and Affect: Mood normal.         Behavior: Behavior normal.             Current Facility-Administered Medications:     acetaminophen (TYLENOL) tablet 650 mg, 650 mg, Oral, Q6H PRN, Richelle Martines MD    albuterol inhalation solution 2.5 mg, 2.5 mg, Nebulization, Q4H PRN, Alessia Gauthier MD, 2.5 mg at 03/27/25 2240    aluminum-magnesium hydroxide-simethicone (MAALOX) oral suspension 30 mL, 30 mL, Oral, Q6H PRN, Richelle Martines MD    aspirin chewable tablet 81 mg, 81 mg, Oral, Daily, Richelle Martines MD, 81 mg at 03/31/25 0845    budesonide (PULMICORT) inhalation solution 0.5 mg, 0.5 mg, Nebulization, Q12H, Richelle Martines MD, 0.5 mg at 03/31/25 0805    [Transfer Hold] chlorhexidine (PERIDEX) 0.12 % oral rinse 15 mL, 15 mL, Mouth/Throat, Q12H GABE, Nguyễn Liu DO, 15 mL at 03/22/25 0932    clotrimazole (LOTRIMIN) 1 % cream, , Topical, BID, Alessia Gauthier MD, Given at 03/31/25 0848    docusate sodium (COLACE) capsule 100 mg, 100 mg, Oral, BID, Richelle Martines MD, 100 mg at 03/31/25 0845    fluticasone (FLONASE) 50 mcg/act nasal spray 2 spray, 2 spray, Each Nare, Daily, Constantin Moreira MD, 2 spray at 03/31/25 0847    formoterol (PERFOROMIST) nebulizer solution 20 mcg, 20 mcg, Nebulization, Q12H, Richelle Martines MD, 20 mcg at 03/30/25 0759    heparin (porcine) subcutaneous injection 5,000 Units, 5,000 Units, Subcutaneous, Q8H [facility], Richelle Martines MD, 5,000 Units at 03/31/25 0538    ipratropium (ATROVENT) 0.02 % inhalation solution 0.5 mg, 0.5 mg, Nebulization, Q6H, Alessia Gauthier MD, 0.5 mg at 03/31/25 0805    levalbuterol (XOPENEX) inhalation solution 1.25 mg, 1.25 mg, Nebulization, Q6H, Alessia Gauthier MD, 1.25 mg at 03/31/25 0805    losartan (COZAAR) tablet 12.5 mg, 12.5 mg, Oral, Daily, An Bautista MD, 12.5 mg at 03/30/25 1252    ondansetron (ZOFRAN) injection 4 mg, 4 mg, Intravenous,  Q6H PRN, Richelle Martines MD    polyethylene glycol (MIRALAX) packet 17 g, 17 g, Oral, Daily, Richelle Martines MD, 17 g at 03/31/25 0845    pravastatin (PRAVACHOL) tablet 80 mg, 80 mg, Oral, Daily With Dinner, Richelle Martines MD, 80 mg at 03/30/25 1738    predniSONE tablet 10 mg, 10 mg, Oral, Daily, Richelle Martines MD, 10 mg at 03/31/25 0845    torsemide (DEMADEX) tablet 40 mg, 40 mg, Oral, Daily, DELIA Dickinson, 40 mg at 03/31/25 0845    Labs & Results:      Results from last 7 days   Lab Units 03/31/25  0545 03/30/25  0607 03/29/25  0534   WBC Thousand/uL 6.17 6.48 6.89   HEMOGLOBIN g/dL 9.8* 10.1* 10.2*   HEMATOCRIT % 33.5* 32.8* 34.5*   PLATELETS Thousands/uL 173 187 189         Results from last 7 days   Lab Units 03/31/25  0636 03/30/25  0607 03/29/25  0534   POTASSIUM mmol/L 4.1 4.2 3.9   CHLORIDE mmol/L 86* 88* 90*   CO2 mmol/L >45* >45* 44*   BUN mg/dL 30* 27* 31*   CREATININE mg/dL 0.87 0.76 0.82   CALCIUM mg/dL 9.1 9.0 9.0             Thank you for the opportunity to participate in the care of this patient.    Chantel Ye PA-C  Advanced Heart Failure  [facility]

## 2025-03-31 NOTE — ASSESSMENT & PLAN NOTE
"Pulmonary following for end stage COPD  Patient continues to alternate between supplemental O2 and BiPAP, both of which he is accustomed to using at home  Experiencing pulmonary cachexia but has a good appetite  Continue fan for dyspnea  Patient has declined symptomatic treatment of dyspnea with opioids reporting \"morphine killed\" his mother  "

## 2025-03-31 NOTE — PROGRESS NOTES
Progress Note - Hospitalist   Name: Natalio Hartmann Jr. 69 y.o. male I MRN: [id number]  Unit/Bed#: [facility] 431-01 I Date of Admission: 3/14/2025   Date of Service: 3/31/2025 I Hospital Day: 17    Assessment & Plan  Acute on chronic respiratory failure with hypoxia and hypercapnia (HCC)  History of COPD and CHF, baseline O2 needs of 4 L via nasal cannula  Uses trilogy NIV at night and is on prednisone 10 mg daily chronically  Admitted to ICU for IV diuresis and respiratory support with BiPAP, steroids, nebulizers,  improved and tansferred to floor on 3/22  Most likely due to severe COPD  Pulmonology following  Pulmonary following, continues to need intermittent BiPAP versus high flow nasal cannula due to dyspnea   family meeting with palliative care, cardiology, pulmonology and patient's wife done 3/28, patient's goal is to go home; he wants to be discharged as soon as possible; need to actively participate in PT over the weekend to improve endurance.    Continue to encourage to work with PT  Patient and his wife are now open to [facility] or [facility]; referral requested by case management, still full code, and not open to hospice  Acute on chronic systolic congestive heart failure (HCC)  Wt Readings from Last 3 Encounters:   03/31/25 42.2 kg (93 lb)   02/21/25 44.3 kg (97 lb 11.2 oz)   09/06/24 44.9 kg (99 lb)   Known history of nonischemic cardiomyopathy with LVEF 25%  Presents with worsening shortness of breath volume overload state noted  Admitted to ICU, volume status was improving and patient deemed appropriate for discharge to the floor on 3/22  transitioned to PO diuretics on 3/25- torsemide 40 mg daily  I's/O, daily weight  COPD exacerbation (HCC)  Monitored by pulmonology during hospitalization.  Low concern for acute exacerbation  Continue inhaler regimen and prednisone 10 mg daily  SIRS (systemic inflammatory response syndrome)  SIRS present on admission, patient initially covered for suspected pneumonia. Symptoms  felt more likely to be related to CHF, and abx stopped after 4 days  Blood cultures negative  Monitor off antibiotics  Obstructive sleep apnea  BiPAP as needed and at bedtime, patient appears to be tolerating  Metabolic encephalopathy  resolved  Secondary to respiratory failure which is multifactorial related to COPD and CHF.  Probable hypoxia and hypercapnia component as well  Status appears to be improving with nocturnal BIPAP  Continue supportive measures and monitor  Severe protein-calorie malnutrition (HCC)  Malnutrition Findings:     Body mass index is 17.01 kg/m².   Encourage adequate protein and calorie intake  Goals of care, counseling/discussion  Patient with multiple comorbidities and overall guarded prognosis.  Still continues Level 1 Full Code at this time as per family wishes  Palliative care team following, involvement appreciated  Hyperlipidemia  Resume Pravachol 80mg po qd  Malnutrition (HCC)  Malnutrition Findings:   Adult Malnutrition type: Chronic illness  Adult Degree of Malnutrition: Other severe protein calorie malnutrition  Malnutrition Characteristics: Fat loss, Muscle loss              360 Statement: severe malnutrition r/t chronic illness as evidenced by severe muscle loss around clavicles and shoulders, moderate-severe muscle loss in temples, severe buccal fat pad loss, apparent depression between ribs suggesting severe fat loss. Treatment: oral diet and oral nutrition supplements  BMI Findings:         Body mass index is 17.01 kg/m².   Palliative care by specialist      VTE Pharmacologic Prophylaxis: VTE Score: 8 High Risk (Score >/= 5) - Pharmacological DVT Prophylaxis Ordered: heparin. Sequential Compression Devices Ordered.    Mobility:   Basic Mobility Inpatient Raw Score: 17  JH-HLM Goal: 5: Stand one or more mins  JH-HLM Achieved: 2: Bed activities/Dependent transfer  JH-HLM Goal NOT achieved. Continue with multidisciplinary rounding and encourage appropriate mobility to improve  upon [facility] goals.    Patient Centered Rounds: I performed bedside rounds with nursing staff today.   Discussions with Specialists or Other Care Team Provider: RN; case management, palliative care, pulmonology    Education and Discussions with Family / Patient:  discussed with the patient and wife at bedside.     Current Length of Stay: 17 day(s)  Current Patient Status: Inpatient   Certification Statement: The patient will continue to require additional inpatient hospital stay due to need for diuresis, management of respiratory failure  Discharge Plan: Safe discharge planning, case management involved  Code Status: Level 1 - Full Code    Subjective   Patient seen and examined, no new complains, reports feeling hot and fills that hot air makes his breathing worse    Objective :  Temp:  [97.4 °F (36.3 °C)-98.1 °F (36.7 °C)] 98.1 °F (36.7 °C)  HR:  [62-86] 85  BP: ()/(58-76) 105/63  Resp:  [18-20] 18  SpO2:  [93 %-100 %] 97 %  O2 Device: Nasal cannula  Nasal Cannula O2 Flow Rate (L/min):  [4 L/min] 4 L/min  FiO2 (%):  [35] 35    Body mass index is 17.01 kg/m².     Input and Output Summary (last 24 hours):     Intake/Output Summary (Last 24 hours) at 3/31/2025 1514  Last data filed at 3/31/2025 0900  Gross per 24 hour   Intake 420 ml   Output 950 ml   Net -530 ml       Physical Exam  Constitutional:       General: He is in acute distress.      Appearance: He is ill-appearing.   HENT:      Head: Normocephalic and atraumatic.   Pulmonary:      Effort: Respiratory distress present.      Breath sounds: No wheezing, rhonchi or rales.      Comments: Diminished breath sounds b/l  Chest:      Chest wall: No tenderness.   Abdominal:      General: Abdomen is flat.      Palpations: Abdomen is soft.      Tenderness: There is no abdominal tenderness.   Musculoskeletal:      Right lower leg: No edema.      Left lower leg: No edema.   Skin:     General: Skin is warm and dry.      Capillary Refill: Capillary refill takes less  than 2 seconds.   Neurological:      Mental Status: He is oriented to person, place, and time.           Lines/Drains:  Lines/Drains/Airways       Active Status       Name Placement date Placement time Site Days    External Urinary Catheter 03/21/25  1300  -- 10                      Telemetry:  Telemetry Orders (From admission, onward)               24 Hour Telemetry Monitoring  Continuous x 24 Hours (Telem)        Expiring   Question:  Reason for 24 Hour Telemetry  Answer:  Decompensated CHF- and any one of the following: continuous diuretic infusion or total diuretic dose >200 mg daily, associated electrolyte derangement (I.e. K < 3.0), inotropic drip (continuous infusion), hx of ventricular arrhythmia, or new EF < 35%                                Lab Results: I have reviewed the following results:   Results from last 7 days   Lab Units 03/31/25  0545   WBC Thousand/uL 6.17   HEMOGLOBIN g/dL 9.8*   HEMATOCRIT % 33.5*   PLATELETS Thousands/uL 173   SEGS PCT % 77*   LYMPHO PCT % 12*   MONO PCT % 10   EOS PCT % 1     Results from last 7 days   Lab Units 03/31/25  0636 03/30/25  0607 03/29/25  0534   SODIUM mmol/L 137   < > 139   POTASSIUM mmol/L 4.1   < > 3.9   CHLORIDE mmol/L 86*   < > 90*   CO2 mmol/L >45*   < > 44*   BUN mg/dL 30*   < > 31*   CREATININE mg/dL 0.87   < > 0.82   ANION GAP mmol/L  --   --  5   CALCIUM mg/dL 9.1   < > 9.0   GLUCOSE RANDOM mg/dL 177*   < > 99    < > = values in this interval not displayed.         Results from last 7 days   Lab Units 03/26/25  1703   POC GLUCOSE mg/dl 217*               Recent Cultures (last 7 days):               Last 24 Hours Medication List:     Current Facility-Administered Medications:     acetaminophen (TYLENOL) tablet 650 mg, Q6H PRN    albuterol inhalation solution 2.5 mg, Q4H PRN    aluminum-magnesium hydroxide-simethicone (MAALOX) oral suspension 30 mL, Q6H PRN    aspirin chewable tablet 81 mg, Daily    budesonide (PULMICORT) inhalation solution 0.5 mg,  Q12H    [START ON 4/1/2025] calcium carbonate-vitamin D 500 mg-5 mcg tablet 1 tablet, Daily With Breakfast    [Transfer Hold] chlorhexidine (PERIDEX) 0.12 % oral rinse 15 mL, Q12H GABE    clotrimazole (LOTRIMIN) 1 % cream, BID    docusate sodium (COLACE) capsule 100 mg, BID    fluticasone (FLONASE) 50 mcg/act nasal spray 2 spray, Daily    formoterol (PERFOROMIST) nebulizer solution 20 mcg, Q12H    heparin (porcine) subcutaneous injection 5,000 Units, Q8H GABE    ipratropium (ATROVENT) 0.02 % inhalation solution 0.5 mg, Q6H    levalbuterol (XOPENEX) inhalation solution 1.25 mg, Q6H    losartan (COZAAR) tablet 12.5 mg, Daily    ondansetron (ZOFRAN) injection 4 mg, Q6H PRN    polyethylene glycol (MIRALAX) packet 17 g, Daily    predniSONE tablet 10 mg, Daily    torsemide (DEMADEX) tablet 40 mg, Daily    Administrative Statements   Today, Patient Was Seen By: Alessia Gauthier MD      **Please Note: This note may have been constructed using a voice recognition system.**

## 2025-03-31 NOTE — ASSESSMENT & PLAN NOTE
Wt Readings from Last 3 Encounters:   03/31/25 42.2 kg (93 lb)   02/21/25 44.3 kg (97 lb 11.2 oz)   09/06/24 44.9 kg (99 lb)   Known history of nonischemic cardiomyopathy with LVEF 25%  Presents with worsening shortness of breath volume overload state noted  Admitted to ICU, volume status was improving and patient deemed appropriate for discharge to the floor on 3/22  transitioned to PO diuretics on 3/25- torsemide 40 mg daily  I's/O, daily weight

## 2025-03-31 NOTE — ASSESSMENT & PLAN NOTE
History of COPD and CHF, baseline O2 needs of 4 L via nasal cannula  Uses trilogy NIV at night and is on prednisone 10 mg daily chronically  Admitted to ICU for IV diuresis and respiratory support with BiPAP, steroids, nebulizers,  improved and tansferred to floor on 3/22  Most likely due to severe COPD  Pulmonology following  Pulmonary following, continues to need intermittent BiPAP versus high flow nasal cannula due to dyspnea   family meeting with palliative care, cardiology, pulmonology and patient's wife done 3/28, patient's goal is to go home; he wants to be discharged as soon as possible; need to actively participate in PT over the weekend to improve endurance.    Continue to encourage to work with PT  Patient and his wife are now open to [facility] or [facility]; referral requested by case management, still full code, and not open to hospice

## 2025-03-31 NOTE — ASSESSMENT & PLAN NOTE
Continues to have good appetite, picture consistent with pulmonary cachexia    Malnutrition Findings:   Adult Malnutrition type: Chronic illness  Adult Degree of Malnutrition: Other severe protein calorie malnutrition  Malnutrition Characteristics: Fat loss, Muscle loss                  360 Statement: severe malnutrition r/t chronic illness as evidenced by severe muscle loss around clavicles and shoulders, moderate-severe muscle loss in temples, severe buccal fat pad loss, apparent depression between ribs suggesting severe fat loss. Treatment: oral diet and oral nutrition supplements    BMI Findings:           Body mass index is 17.01 kg/m².

## 2025-03-31 NOTE — CASE MANAGEMENT
Case Management Discharge Planning Note    Patient name Natalio Hartmann Jr.  [facility]N [id number]  : 1955 Date 3/31/2025       Current Admission Date: 3/14/2025  Current Admission Diagnosis:Acute on chronic respiratory failure with hypoxia and hypercapnia (HCC)   Patient Active Problem List    Diagnosis Date Noted Date Diagnosed    Iron deficiency anemia secondary to inadequate dietary iron intake 2024     SIADH (syndrome of inappropriate ADH production) ([facility]) 2024     Type 2 diabetes mellitus without complication, with long-term current use of insulin ([facility]) 2024     End stage COPD ([facility]) 2024     Dependence on respirator (ventilator) status ([facility]) 01/10/2024     Severe protein-calorie malnutrition ([facility]) 10/14/2023     History of bladder cancer 2023     Pulmonary cachexia due to COPD ([facility])      Chronic hypercapnia/KEVIN 2023     Metabolic encephalopathy 2023     Palliative care by specialist 2023     Alkalosis 2023     Malnutrition ([facility]) 06/10/2023     Hyperlipidemia 2023     COPD exacerbation ([facility]) 2023     Steroid-induced hyperglycemia 2023     Physical deconditioning 2023     Chronic anemia 2023     SIRS (systemic inflammatory response syndrome) 2023     Hyponatremia 2023     Acute on chronic systolic congestive heart failure ([facility]) 2022     Pulmonary nodules/lesions, multiple 2022     Prostate cancer ([facility]) 2021     BPH with obstruction/lower urinary tract symptoms 2021     Goals of care, counseling/discussion 2021     Acute on chronic respiratory failure with hypoxia and hypercapnia ([facility]) 2020     Macrocytosis without anemia 2019     Other insomnia 2019     GERD (gastroesophageal reflux disease) 2017     Nonobstructive atherosclerosis of coronary artery 2016     Mood disorder ([facility]) 2015     Prediabetes 2015      Osteoporosis 10/14/2014     Vitamin D deficiency 10/14/2014     Nonischemic cardiomyopathy (HCC) 09/26/2014     Left bundle-branch block 08/03/2013     Osteoarthritis 08/03/2013     Obstructive sleep apnea 07/29/2013       LOS (days): 17  Geometric Mean LOS (GMLOS) (days): 3.9  Days to GMLOS:-13.2     OBJECTIVE:  Risk of Unplanned Readmission Score: 23.49         Current admission status: Inpatient   Preferred Pharmacy:   [id number] - [place], [place] - [address]  [address]  [place] [address]  Phone: [phone] Fax: [phone]    Primary Care Provider: Fatmata Gustafson DO    Primary Insurance: BLUE CROSS  Secondary Insurance: MEDICARE    DISCHARGE DETAILS:    Discharge planning discussed with:: Patient and patient's wife, Mellisa  Freedom of Choice: Yes  Comments - Freedom of Choice: CM discussed PT/OT recommendations with patient's wifeMellisa. Mellisa explained to CM that patient is agreeable to go to [facility] or [facility]. CM explained that is a different level of care than what patient is being recommended. CM spoke to her about STR and STR referrals. She declined STR referral at this time and only wants referral to [facility] and [facility].  CM contacted family/caregiver?: Yes  Were Treatment Team discharge recommendations reviewed with patient/caregiver?: Yes  Did patient/caregiver verbalize understanding of patient care needs?: N/A- going to facility  Were patient/caregiver advised of the risks associated with not following Treatment Team discharge recommendations?: Yes        Additional Comments: [facility] sent message in [facility] stating that they would need updated PT/OT notes for patient. CM met with patient at bedside and explained that [facility] was reviewing and would need updated notes and explained that PT and OT would work him tomorrow.

## 2025-03-31 NOTE — ASSESSMENT & PLAN NOTE
Wt Readings from Last 3 Encounters:   03/31/25 42.2 kg (93 lb)   02/21/25 44.3 kg (97 lb 11.2 oz)   09/06/24 44.9 kg (99 lb)     Clear from heart failure perspective for discharge

## 2025-03-31 NOTE — OCCUPATIONAL THERAPY NOTE
Occupational Therapy Progress Note     Patient Name: Natalio Hartmann Jr.  Today's Date: 3/31/2025  Problem List  Principal Problem:    Acute on chronic respiratory failure with hypoxia and hypercapnia (HCC)  Active Problems:    Obstructive sleep apnea    Goals of care, counseling/discussion    Acute on chronic systolic congestive heart failure (HCC)    SIRS (systemic inflammatory response syndrome)    COPD exacerbation (HCC)    Hyperlipidemia    Malnutrition (HCC)    Palliative care by specialist    Metabolic encephalopathy    Severe protein-calorie malnutrition (HCC)        03/31/25 1331   OT Last Visit   OT Visit Date 03/31/25   Note Type   Note Type Treatment   Pain Assessment   Pain Assessment Tool 0-10   Pain Score No Pain   Restrictions/Precautions   Weight Bearing Precautions Per Order No   Other Precautions Cognitive;Chair Alarm;Bed Alarm;Multiple lines;Telemetry;O2;Fall Risk;Pain   Lifestyle   Autonomy I w/ ADLS, assist IADLS, Mod I w/ transfers and functional mobility PTA   Reciprocal Relationships Pt lives w/ his spouse and son   Service to Others retired; demolition   Intrinsic Gratification building model cars   ADL   Where Assessed Supine, bed   Grooming Assistance 2  Maximal Assistance   Grooming Deficit Increased time to complete;Brushing hair   Grooming Comments Pt requires MAX A to comb hair supine in bed   Bed Mobility   Additional Comments Pt reporting fatigue and deferring all mobility at this time, despite education and encouragment   Therapeutic Excerise-Strength   UE Strength Yes   Right Upper Extremity- Strength   RUE Strength Comment Pt provided with and educated on HEP with red theraband. Pt instrucated to complete each exersice 3 times per day for UE strengthening for functional activity.   Left Upper Extremity-Strength   LUE Strength Comment Pt provided with and educated on HEP with red theraband. Pt instrucated to complete each exersice 3 times per day for UE strengthening for functional  activity.   Cognition   Overall Cognitive Status Impaired   Arousal/Participation Alert;Responsive   Attention Attends with cues to redirect   Orientation Level Oriented X4   Memory Decreased recall of precautions   Following Commands Follows one step commands without difficulty   Comments Pt agreeable to therapy. Pt with decreased insight to condition and safety awareness   Activity Tolerance   Activity Tolerance Patient limited by fatigue   Medical Staff Made Aware RN Cleared   Assessment   Assessment Pt was seen on 3/31/2025 to address ADL retraining, functional transfer training, and activity tolerance/endurance. Pt with active OT orders and activity orders. Pt deferring all mobility at this time due to fatigue, despite education and encouragement. Pt requires MAX A to brush hair supine in bed. Pt provided with and educated on HEP with red theraband. Pt instrucated to complete each exersice 3 times per day for UE strengthening for functional activity.  Pt is limited by decreased ADL status, functional transfers, functional mobility, and activity tolerance. Pt supine in bed at beginning of session and supine in bed at end of session with alarm set and all items within reach. The patient's raw score on the -PAC Daily Activity Inpatient Short Form is 12. A raw score of less than 19 suggests the patient may benefit from discharge to post-acute rehabilitation services. Please refer to the recommendation of the Occupational Therapist for safe discharge planning. Recommend Level II moderate intensity OT services at d/c to maximize pt function.   Plan   Treatment Interventions ADL retraining;UE strengthening/ROM;Endurance training;Cognitive reorientation;Patient/family training;Equipment evaluation/education;Compensatory technique education;Continued evaluation;Energy conservation;Activityengagement   Goal Expiration Date 04/03/25   OT Treatment Day 2   OT Frequency 2-3x/wk   Discharge Recommendation   Rehab Resource  Intensity Level, OT II (Moderate Resource Intensity)   AM-PAC Daily Activity Inpatient   Lower Body Dressing 2   Bathing 2   Toileting 2   Upper Body Dressing 2   Grooming 2   Eating 2   Daily Activity Raw Score 12   Daily Activity Standardized Score (Calc for Raw Score >=11) 30.6   AM-PAC Applied Cognition Inpatient   Following a Speech/Presentation 3   Understanding Ordinary Conversation 4   Taking Medications 3   Remembering Where Things Are Placed or Put Away 3   Remembering List of 4-5 Errands 2   Taking Care of Complicated Tasks 2   Applied Cognition Raw Score 17   Applied Cognition Standardized Score 36.52   End of Consult   Education Provided Family or social support of family present for education by provider;Yes   Patient Position at End of Consult Supine;Bed/Chair alarm activated;All needs within reach   Nurse Communication Nurse aware of consult     NOLAN Gusman, OTR/L

## 2025-03-31 NOTE — ASSESSMENT & PLAN NOTE
See ACP note dated 3/28.   Patient is not ready to forego further hospitalizations or medical treatment at this time  His primary goal is to return home, but willing to await rehab determinations. He is only open to [facility] or [facility] for STR.  Patient continues to request full code, full cares

## 2025-03-31 NOTE — ASSESSMENT & PLAN NOTE
"Palliative Diagnosis: end stage COPD    Goals:   Patient continues to have competing goals.  He does not find his quality of life in hospital to be acceptable, but also, does not wish to engage in any end of life cares or set any limits on cares.  Explicitly declines hospice.  This is similar to previous conversations our team (including myself) has had during his healthcare journey.  Nevertheless, he was more welcoming of our team than previously and if he continues to allow it I believe he would benefit from ongoing rapport building and Palliative Care support.    See ACP note dated 3/28 regarding family meeting  Palliative will follow for ongoing goals of care discussions as situation evolves.    Social Support:  Patient's support system: spouse, son  Supportive listening provided  Normalized experience of patient  Advocated for patient/family with interdisciplinary team    Care Coordination  Case discussed with IM provider, CM, HF, pulmonology    Follow-up  We appreciate the opportunity to participate in this patient's care.   We will continue to follow while admitted.  Patient is eligible for the palliative home program upon discharge  Please do not hesitate to contact our on-call provider through EPIC Secure Chat or contact [phone] should there be an acute change or other symptom control concerns.    Symptom Mgmt:  Returned to talk to Mr. Hartmann about using low dose opioids for breathlessness.  Discusses how \"morphine killed [his] mother\" and he declines to take \"any of those medications.\"   Explored somewhat as able but will respect patient's decision.    Please do not make any changes to symptom medications (opioid analgesics, nonopioid analgesics, antiemetics, etc) without first consulting the on-call Palliative Care provider for your specific campus; including after hours and on weekends. We are available 24/7, and can be contacted on Epic secure chat or at [phone].    "

## 2025-04-01 PROBLEM — G93.41 METABOLIC ENCEPHALOPATHY: Status: RESOLVED | Noted: 2023-07-20 | Resolved: 2025-04-01

## 2025-04-01 LAB
ANION GAP SERPL CALCULATED.3IONS-SCNC: 5 MMOL/L (ref 4–13)
BASOPHILS # BLD AUTO: 0.04 THOUSANDS/ÂΜL (ref 0–0.1)
BASOPHILS NFR BLD AUTO: 1 % (ref 0–1)
BUN SERPL-MCNC: 37 MG/DL (ref 5–25)
CALCIUM SERPL-MCNC: 9 MG/DL (ref 8.4–10.2)
CHLORIDE SERPL-SCNC: 88 MMOL/L (ref 96–108)
CO2 SERPL-SCNC: 43 MMOL/L (ref 21–32)
CREAT SERPL-MCNC: 0.96 MG/DL (ref 0.6–1.3)
EOSINOPHIL # BLD AUTO: 0.04 THOUSAND/ÂΜL (ref 0–0.61)
EOSINOPHIL NFR BLD AUTO: 1 % (ref 0–6)
ERYTHROCYTE [DISTWIDTH] IN BLOOD BY AUTOMATED COUNT: 13.1 % (ref 11.6–15.1)
GFR SERPL CREATININE-BSD FRML MDRD: 81 ML/MIN/1.73SQ M
GLUCOSE SERPL-MCNC: 91 MG/DL (ref 65–140)
HCT VFR BLD AUTO: 34.7 % (ref 36.5–49.3)
HGB BLD-MCNC: 10.5 G/DL (ref 12–17)
IMM GRANULOCYTES # BLD AUTO: 0.01 THOUSAND/UL (ref 0–0.2)
IMM GRANULOCYTES NFR BLD AUTO: 0 % (ref 0–2)
LYMPHOCYTES # BLD AUTO: 0.66 THOUSANDS/ÂΜL (ref 0.6–4.47)
LYMPHOCYTES NFR BLD AUTO: 11 % (ref 14–44)
MCH RBC QN AUTO: 30.7 PG (ref 26.8–34.3)
MCHC RBC AUTO-ENTMCNC: 30.3 G/DL (ref 31.4–37.4)
MCV RBC AUTO: 102 FL (ref 82–98)
MONOCYTES # BLD AUTO: 0.7 THOUSAND/ÂΜL (ref 0.17–1.22)
MONOCYTES NFR BLD AUTO: 12 % (ref 4–12)
NEUTROPHILS # BLD AUTO: 4.38 THOUSANDS/ÂΜL (ref 1.85–7.62)
NEUTS SEG NFR BLD AUTO: 75 % (ref 43–75)
NRBC BLD AUTO-RTO: 0 /100 WBCS
PLATELET # BLD AUTO: 173 THOUSANDS/UL (ref 149–390)
PMV BLD AUTO: 10.2 FL (ref 8.9–12.7)
POTASSIUM SERPL-SCNC: 4.4 MMOL/L (ref 3.5–5.3)
RBC # BLD AUTO: 3.42 MILLION/UL (ref 3.88–5.62)
SODIUM SERPL-SCNC: 136 MMOL/L (ref 135–147)
WBC # BLD AUTO: 5.83 THOUSAND/UL (ref 4.31–10.16)

## 2025-04-01 PROCEDURE — 94660 CPAP INITIATION&MGMT: CPT

## 2025-04-01 PROCEDURE — 97530 THERAPEUTIC ACTIVITIES: CPT

## 2025-04-01 PROCEDURE — 80048 BASIC METABOLIC PNL TOTAL CA: CPT | Performed by: FAMILY MEDICINE

## 2025-04-01 PROCEDURE — 94760 N-INVAS EAR/PLS OXIMETRY 1: CPT

## 2025-04-01 PROCEDURE — 99233 SBSQ HOSP IP/OBS HIGH 50: CPT | Performed by: PHYSICIAN ASSISTANT

## 2025-04-01 PROCEDURE — 94640 AIRWAY INHALATION TREATMENT: CPT

## 2025-04-01 PROCEDURE — 99232 SBSQ HOSP IP/OBS MODERATE 35: CPT | Performed by: INTERNAL MEDICINE

## 2025-04-01 PROCEDURE — 97116 GAIT TRAINING THERAPY: CPT

## 2025-04-01 PROCEDURE — 97535 SELF CARE MNGMENT TRAINING: CPT

## 2025-04-01 PROCEDURE — 85025 COMPLETE CBC W/AUTO DIFF WBC: CPT | Performed by: FAMILY MEDICINE

## 2025-04-01 PROCEDURE — 82652 VIT D 1 25-DIHYDROXY: CPT | Performed by: STUDENT IN AN ORGANIZED HEALTH CARE EDUCATION/TRAINING PROGRAM

## 2025-04-01 RX ADMIN — PREDNISONE 10 MG: 10 TABLET ORAL at 08:29

## 2025-04-01 RX ADMIN — LEVALBUTEROL HYDROCHLORIDE 1.25 MG: 1.25 SOLUTION RESPIRATORY (INHALATION) at 19:58

## 2025-04-01 RX ADMIN — BUDESONIDE 0.5 MG: 0.5 INHALANT RESPIRATORY (INHALATION) at 07:22

## 2025-04-01 RX ADMIN — IPRATROPIUM BROMIDE 0.5 MG: 0.5 SOLUTION RESPIRATORY (INHALATION) at 07:22

## 2025-04-01 RX ADMIN — FORMOTEROL FUMARATE DIHYDRATE 20 MCG: 20 SOLUTION RESPIRATORY (INHALATION) at 19:59

## 2025-04-01 RX ADMIN — FLUTICASONE PROPIONATE 2 SPRAY: 50 SPRAY, METERED NASAL at 08:37

## 2025-04-01 RX ADMIN — BUDESONIDE 0.5 MG: 0.5 INHALANT RESPIRATORY (INHALATION) at 19:58

## 2025-04-01 RX ADMIN — DOCUSATE SODIUM 100 MG: 100 CAPSULE, LIQUID FILLED ORAL at 17:44

## 2025-04-01 RX ADMIN — LEVALBUTEROL HYDROCHLORIDE 1.25 MG: 1.25 SOLUTION RESPIRATORY (INHALATION) at 07:22

## 2025-04-01 RX ADMIN — FORMOTEROL FUMARATE DIHYDRATE 20 MCG: 20 SOLUTION RESPIRATORY (INHALATION) at 07:22

## 2025-04-01 RX ADMIN — DOCUSATE SODIUM 100 MG: 100 CAPSULE, LIQUID FILLED ORAL at 08:29

## 2025-04-01 RX ADMIN — HEPARIN SODIUM 5000 UNITS: 5000 INJECTION INTRAVENOUS; SUBCUTANEOUS at 21:53

## 2025-04-01 RX ADMIN — Medication 1 TABLET: at 08:29

## 2025-04-01 RX ADMIN — LEVALBUTEROL HYDROCHLORIDE 1.25 MG: 1.25 SOLUTION RESPIRATORY (INHALATION) at 14:07

## 2025-04-01 RX ADMIN — IPRATROPIUM BROMIDE 0.5 MG: 0.5 SOLUTION RESPIRATORY (INHALATION) at 14:07

## 2025-04-01 RX ADMIN — IPRATROPIUM BROMIDE 0.5 MG: 0.5 SOLUTION RESPIRATORY (INHALATION) at 19:58

## 2025-04-01 RX ADMIN — HEPARIN SODIUM 5000 UNITS: 5000 INJECTION INTRAVENOUS; SUBCUTANEOUS at 15:13

## 2025-04-01 RX ADMIN — LEVALBUTEROL HYDROCHLORIDE 1.25 MG: 1.25 SOLUTION RESPIRATORY (INHALATION) at 02:16

## 2025-04-01 RX ADMIN — ASPIRIN 81 MG CHEWABLE TABLET 81 MG: 81 TABLET CHEWABLE at 08:29

## 2025-04-01 RX ADMIN — CLOTRIMAZOLE: 1 CREAM TOPICAL at 17:45

## 2025-04-01 RX ADMIN — HEPARIN SODIUM 5000 UNITS: 5000 INJECTION INTRAVENOUS; SUBCUTANEOUS at 05:32

## 2025-04-01 RX ADMIN — CLOTRIMAZOLE: 1 CREAM TOPICAL at 08:37

## 2025-04-01 RX ADMIN — LOSARTAN POTASSIUM 12.5 MG: 25 TABLET, FILM COATED ORAL at 12:12

## 2025-04-01 RX ADMIN — TORSEMIDE 40 MG: 20 TABLET ORAL at 08:29

## 2025-04-01 RX ADMIN — IPRATROPIUM BROMIDE 0.5 MG: 0.5 SOLUTION RESPIRATORY (INHALATION) at 02:16

## 2025-04-01 NOTE — ASSESSMENT & PLAN NOTE
Continues to have good appetite, picture consistent with pulmonary cachexia    Malnutrition Findings:   Adult Malnutrition type: Chronic illness  Adult Degree of Malnutrition: Other severe protein calorie malnutrition  Malnutrition Characteristics: Fat loss, Muscle loss                  360 Statement: severe malnutrition r/t chronic illness as evidenced by severe muscle loss around clavicles and shoulders, moderate-severe muscle loss in temples, severe buccal fat pad loss, apparent depression between ribs suggesting severe fat loss. Treatment: oral diet and oral nutrition supplements    BMI Findings:           Body mass index is 17.41 kg/m².

## 2025-04-01 NOTE — PHYSICAL THERAPY NOTE
"   Physical Therapy Treatment Note       04/01/25 1000   PT Last Visit   PT Visit Date 04/01/25   Note Type   Note Type Treatment   Pain Assessment   Pain Assessment Tool 0-10   Pain Score No Pain   Restrictions/Precautions   Weight Bearing Precautions Per Order No   Other Precautions Multiple lines;Telemetry;Fall Risk;Pain;Cognitive;Chair Alarm;Bed Alarm  (chair alarm armed post session)   General   Chart Reviewed Yes   Family/Caregiver Present Yes  (wife)   Cognition   Overall Cognitive Status Impaired   Arousal/Participation Responsive   Attention Attends with cues to redirect   Orientation Level Oriented to person;Oriented to place   Memory Unable to assess   Following Commands Follows one step commands inconsistently   Subjective   Subjective \"I need more air.\"   Bed Mobility   Supine to Sit 5  Supervision   Additional items Assist x 1;Increased time required;Impulsive;Verbal cues   Additional Comments sat EOB x several minutes prior to gait   Transfers   Sit to Stand 4  Minimal assistance   Additional items Assist x 1;Increased time required;Verbal cues;Impulsive   Stand to Sit 5  Supervision   Additional items Assist x 1;Increased time required;Impulsive;Verbal cues   Additional Comments stood at bed x several minutes prior to gait to clean pt.   Ambulation/Elevation   Gait pattern   (slow, ataxia, short step length, forward flexion, wide CARLY)   Gait Assistance 4  Minimal assist   Additional items Assist x 1   Assistive Device Rolling walker   Distance 60'+40' w/ extended seated rest.  time spent post session to reposition   Balance   Static Sitting Good   Dynamic Sitting Fair   Static Standing Fair -   Dynamic Standing Poor +   Ambulatory Poor +   Endurance Deficit   Endurance Deficit Yes   Endurance Deficit Description fatigue, weakness   Activity Tolerance   Activity Tolerance Patient tolerated treatment well;Patient limited by fatigue;Patient limited by pain;Treatment limited secondary to medical " complications (Comment)   Nurse Made Aware yes   Assessment   Prognosis Fair   Problem List Decreased strength;Decreased endurance;Impaired balance;Decreased mobility;Decreased coordination;Pain;Decreased cognition   Assessment Pt seen for session for setup, bed mob, time spent EOB, standing trials, gait w/ extended rest time, repositioning.  Pt cooperative w/ session w/ encouragement, c/o significant SOB and fatigue throughout.  Pt needed a bit less physical assist w/ mobility tasks, but most limited by endurance and mobility tolerance.  needs frequent rests throughout.  continue to lean towards Level II (moderate) post acute care therapy needs.   Goals   Patient Goals to rest   STG Expiration Date 04/03/25   PT Treatment Day 4   Plan   Treatment/Interventions Functional transfer training;LE strengthening/ROM;Endurance training;Elevations;Therapeutic exercise;Patient/family training;Equipment eval/education;Bed mobility;Gait training   Progress Slow progress, medical status limitations   PT Frequency 3-5x/wk   Discharge Recommendation   Rehab Resource Intensity Level, PT II (Moderate Resource Intensity)   Equipment Recommended Walker   Walker Package Recommended Wheeled walker   Change/add to Walker Package? No   AM-PAC Basic Mobility Inpatient   Turning in Flat Bed Without Bedrails 3   Lying on Back to Sitting on Edge of Flat Bed Without Bedrails 3   Moving Bed to Chair 3   Standing Up From Chair Using Arms 3   Walk in Room 2   Climb 3-5 Stairs With Railing 2   Basic Mobility Inpatient Raw Score 16   Basic Mobility Standardized Score 38.32   [facility] Highest Level Of Mobility   -HLM Goal 5: Stand one or more mins   JH-HLM Achieved 7: Walk 25 feet or more     Baldev Jaime PT, DPT, GCS, CSRS

## 2025-04-01 NOTE — ASSESSMENT & PLAN NOTE
Wt Readings from Last 3 Encounters:   04/01/25 43.2 kg (95 lb 3.2 oz)   02/21/25 44.3 kg (97 lb 11.2 oz)   09/06/24 44.9 kg (99 lb)     Clear from heart failure perspective for discharge

## 2025-04-01 NOTE — ARC ADMISSION
ARC  met with patient and spouse Mellisa at bedside. Reviewed ARC program, acute rehab criteria and approval process, insurance authorization process, as well as ARC locations and preferences. Patient's preferred ARC location is [facility] .  ARC Rehab folder left with patient and all questions answered. Patient was made aware that ARC Reviewer will keep their  updated regarding referral status.

## 2025-04-01 NOTE — ASSESSMENT & PLAN NOTE
Family meeting 3/28 - concluded patient will work with therapy for 2 additional days but wants to go home on Monday morning 3/31.

## 2025-04-01 NOTE — UTILIZATION REVIEW
Continued Stay Review    Date: 4/1/2025                          Current Patient Class: Inpatient  Current Level of Care: med/surg    HPI:69 y.o. male initially admitted on 3/14/2025   Current Diagnosis: Acute on Chronic Respiratory Failure with hypoxia and hypercapnia, COPD exacerbation    Assessment/Plan: No acute complaints. Breathing at baseline. Diminished breath sounds bilaterally. Plan for IP rehab on d/c. ARC currently reviewing for acceptance. Continue po meds, nebs. Supportive care.      Medications:   Scheduled Medications:  aspirin, 81 mg, Oral, Daily  budesonide, 0.5 mg, Nebulization, Q12H  calcium carbonate-vitamin D, 1 tablet, Oral, Daily With Breakfast  clotrimazole, , Topical, BID  docusate sodium, 100 mg, Oral, BID  fluticasone, 2 spray, Each Nare, Daily  formoterol, 20 mcg, Nebulization, Q12H  heparin (porcine), 5,000 Units, Subcutaneous, Q8H GABE  ipratropium, 0.5 mg, Nebulization, Q6H  levalbuterol, 1.25 mg, Nebulization, Q6H  losartan, 12.5 mg, Oral, Daily  polyethylene glycol, 17 g, Oral, Daily  predniSONE, 10 mg, Oral, Daily  torsemide, 40 mg, Oral, Daily    PRN Meds:  acetaminophen, 650 mg, Oral, Q6H PRN  Albumin 5%, 25 g, Intravenous, Once PRN  albuterol, 2.5 mg, Nebulization, Q4H PRN  aluminum-magnesium hydroxide-simethicone, 30 mL, Oral, Q6H PRN  ondansetron, 4 mg, Intravenous, Q6H PRN      Discharge Plan: TBD    Vital Signs (last 3 days)       Date/Time Temp Pulse Resp BP MAP (mmHg) SpO2 FiO2 (%) Calculated FIO2 (%) - Nasal Cannula O2 Flow Rate (L/min) Nasal Cannula O2 Flow Rate (L/min) O2 Device O2 Interface Device Patient Position - Orthostatic VS Tessy Coma Scale Score Pain    04/01/25 11:16:48 98 °F (36.7 °C) 74 -- 112/61 78 98 % -- -- -- -- -- -- -- -- --    04/01/25 1000 -- -- -- -- -- -- -- -- -- -- -- -- -- -- No Pain    04/01/25 0947 -- -- -- -- -- -- -- -- -- -- -- -- -- -- No Pain    04/01/25 07:44:36 97.4 °F (36.3 °C) 60 18 104/60 75 98 % -- -- -- -- BiPAP -- Lying -- --     04/01/25 0730 -- -- -- -- -- -- 35 -- -- -- -- -- -- 15 No Pain    04/01/25 0723 -- -- -- -- -- 99 % -- -- -- -- -- Full face mask -- -- --    04/01/25 0316 -- -- -- -- -- -- -- -- -- -- -- Full face mask -- -- --    04/01/25 02:47:27 -- 79 -- 101/61 74 97 % -- -- -- -- -- -- -- -- --    04/01/25 0008 -- -- -- -- -- -- -- -- -- -- -- Full face mask -- -- --    03/31/25 22:18:52 97.5 °F (36.4 °C) 76 -- 106/66 79 97 % -- -- -- -- -- -- Lying -- --    03/31/25 21:34:31 -- 67 -- 105/64 78 93 % -- -- -- -- -- -- -- -- --    03/31/25 2000 -- -- -- -- -- -- -- -- -- -- -- -- -- 15 No Pain    03/31/25 19:25:17 97.5 °F (36.4 °C) 69 -- 86/47 60 99 % -- -- -- -- -- -- Lying -- --    03/31/25 1619 -- -- -- 85/50 -- -- -- -- -- -- -- -- Lying -- --    03/31/25 16:13:27 -- 69 -- 86/48 61 97 % -- -- -- -- -- -- -- -- --    03/31/25 15:32:44 97.9 °F (36.6 °C) 54 -- 89/50 63 97 % -- 36 -- 4 L/min Nasal cannula -- Lying -- --    03/31/25 1356 -- -- -- -- -- 97 % -- -- -- -- -- -- -- -- --    03/31/25 1331 -- -- -- -- -- -- -- -- -- -- -- -- -- -- No Pain    03/31/25 11:06:01 98.1 °F (36.7 °C) 85 18 105/63 77 93 % -- -- -- -- -- -- -- -- --    03/31/25 11:05:37 98.1 °F (36.7 °C) 86 18 105/63 77 95 % -- -- -- -- -- -- -- -- --    03/31/25 0806 -- -- -- -- -- -- -- 36 -- 4 L/min Nasal cannula -- -- -- --    03/31/25 0805 -- -- -- -- -- 99 % -- -- -- -- -- -- -- -- --    03/31/25 07:47:19 97.4 °F (36.3 °C) 74 18 114/59 77 99 % -- -- -- -- Nasal cannula -- Lying -- --    03/31/25 0730 -- -- -- -- -- -- 35 -- -- -- -- -- -- 15 No Pain    03/31/25 0359 -- -- -- -- -- -- -- -- -- -- -- Full face mask -- -- --    03/31/25 02:12:31 -- 62 18 93/58 70 100 % -- -- -- -- -- -- -- -- --    03/30/25 22:33:15 -- 71 -- 96/61 73 99 % -- -- -- -- -- -- -- -- --    03/30/25 2045 -- -- -- -- -- -- -- -- -- -- -- -- -- 15 No Pain    03/30/25 19:39:09 -- 75 -- 108/73 85 97 % -- -- -- -- -- -- -- -- --    03/30/25 1930 -- -- -- -- -- 99 % -- -- -- -- -- Full  face mask -- -- --    03/30/25 16:10:35 98 °F (36.7 °C) 73 20 110/76 87 99 % -- -- -- -- BiPAP -- Lying -- --    03/30/25 1316 -- -- -- -- -- 99 % -- 36 -- 4 L/min Nasal cannula -- -- -- --    03/30/25 11:11:04 99.1 °F (37.3 °C) 81 18 115/75 88 97 % -- -- -- -- -- -- -- -- --    03/30/25 0900 -- -- -- -- -- 98 % -- -- -- -- BiPAP -- -- 15 No Pain    03/30/25 0709 -- -- -- -- -- 99 % -- 32 -- 3 L/min Nasal cannula -- -- -- --    03/30/25 07:08:04 97.6 °F (36.4 °C) 69 19 117/76 90 100 % -- -- -- -- Nasal cannula -- Lying -- --    03/30/25 0439 -- -- -- -- -- -- -- -- -- -- -- Full face mask -- -- --    03/30/25 02:37:17 98.1 °F (36.7 °C) 70 -- 106/66 79 99 % -- -- -- -- -- -- -- -- --    03/29/25 23:04:14 97.1 °F (36.2 °C) 74 -- 108/66 80 100 % -- -- -- -- BiPAP -- Lying -- --    03/29/25 2144 -- -- -- -- -- -- -- -- -- -- -- Full face mask -- -- --    03/29/25 2100 -- -- -- -- -- -- -- -- -- -- -- -- -- 15 No Pain    03/29/25 18:47:46 97.9 °F (36.6 °C) 71 -- 113/69 84 98 % -- -- -- -- -- -- Lying -- --    03/29/25 16:42:42 -- 77 -- 114/70 85 97 % -- -- -- -- -- -- -- -- --    03/29/25 1530 97.8 °F (36.6 °C) 80 -- 117/71 86 99 % -- -- -- -- BiPAP -- Lying -- --    03/29/25 1415 -- -- -- -- -- 99 % -- -- -- -- -- Full face mask -- -- --    03/29/25 1323 -- -- -- -- -- 98 % -- -- -- -- -- -- -- -- --    03/29/25 11:09:11 98.3 °F (36.8 °C) 84 18 117/69 85 95 % -- 36 -- 4 L/min Nasal cannula -- -- -- --    03/29/25 1039 -- -- -- -- -- -- -- -- -- -- -- -- -- -- No Pain    03/29/25 0745 -- -- -- -- -- -- 35 36 -- 4 L/min Nasal cannula -- -- 15 No Pain    03/29/25 07:32:09 98.5 °F (36.9 °C) 77 18 117/68 84 96 % -- -- -- -- Nasal cannula -- Lying -- --    03/29/25 0703 -- -- -- -- -- 97 % -- -- 4 L/min -- Nasal cannula -- -- -- --    03/29/25 0355 -- -- -- -- -- 97 % -- -- -- -- -- Full face mask -- -- --    03/29/25 02:18:19 97.8 °F (36.6 °C) 71 -- 109/66 80 98 % -- -- -- -- -- -- -- -- --    03/29/25 0120 -- -- -- -- -- 97  % -- -- -- -- -- -- -- -- --     Weight (last 2 days)       Date/Time Weight    04/01/25 0600 43.2 (95.2)    03/31/25 0600 42.2 (93)    03/30/25 0600 41.9 (92.3)            Pertinent Labs/Diagnostic Results:   Radiology:  XR chest portable   Final Interpretation by Suhas Graham MD (03/14 1320)      Cardiomegaly and prominent vasculature, possibly related to positioning though pulmonary vascular congestion is not excluded. Clinical correlation for congestive heart failure recommended.            Workstation performed: [id number]           Cardiology:  ECG 12 lead   Final Result by Nguyễn Funez MD (03/17 1354)   Normal sinus rhythm   Left bundle branch block   Abnormal ECG   When compared with ECG of 17-Mar-2025 13:37, (unconfirmed)   Premature ventricular complexes are no longer Present   Confirmed by Nguyễn Funez ([id number]) on 3/17/2025 1:54:31 PM      ECG 12 lead   Final Result by Nguyễn Funez MD (03/17 8343)   Sinus rhythm with occasional Premature ventricular complexes   Left bundle branch block   Abnormal ECG   No previous ECGs available   Confirmed by Nguyễn Funez ([id number]) on 3/17/2025 1:54:33 PM      ECG 12 lead   Final Result by Nguyễn Funez MD (03/17 7300)   Normal sinus rhythm   Left bundle branch block   Abnormal ECG   When compared with ECG of 17-Mar-2025 01:44, (unconfirmed)   Vent. rate has increased by  36 bpm   Confirmed by Nguyễn Funez ([id number]) on 3/17/2025 1:28:16 PM      ECG 12 lead   Final Result by Nguyễn Funez MD (03/17 7320)   Normal sinus rhythm   Left bundle branch block   Abnormal ECG   When compared with ECG of 16-Mar-2025 17:13, (unconfirmed)   No significant change was found   Confirmed by Nguyễn Funez ([id number]) on 3/17/2025 1:30:08 PM      ECG 12 lead   Final Result by Nguyễn Funez MD (03/17 1351)   Normal sinus rhythm   Left bundle branch block   Abnormal ECG   When compared with ECG of 16-Mar-2025 15:45, (unconfirmed)   No significant change was found    Confirmed by Nguyễn Funez ([id number]) on 3/17/2025 1:32:17 PM      ECG 12 lead   Final Result by Nguyễn Funez MD (03/17 5152)   Sinus rhythm with marked sinus arrhythmia   Left bundle branch block   Abnormal ECG   When compared with ECG of 16-Mar-2025 09:21, (unconfirmed)   Vent. rate has decreased by  46 bpm   Confirmed by Nguyễn Funez ([id number]) on 3/17/2025 1:32:21 PM      Echo complete w/ contrast if indicated   Final Result by Lewis Macias MD (03/17 0157)        Left Ventricle: Left ventricular cavity size is severely dilated. Wall    thickness is normal. The left ventricular ejection fraction is 25%.    Systolic function is severely reduced. There is global hypokinesis with    regional variation. Diastolic function is mildly abnormal, consistent with    grade I (abnormal) relaxation.     IVS: There is abnormal septal motion consistent with left bundle branch    block.     Right Ventricle: Right ventricular cavity size is moderately dilated.    Systolic function is low normal.     Left Atrium: The atrium is severely dilated.     Tricuspid Valve: There is mild regurgitation. The right ventricular    systolic pressure is mildly elevated. The estimated right ventricular    systolic pressure is 42.00 mmHg.         ECG 12 lead   Final Result by Nguyễn Funez MD (03/17 8802)   Sinus tachycardia   Left bundle branch block   Abnormal ECG   When compared with ECG of 15-Mar-2025 19:01, (unconfirmed)   Vent. rate has increased by  48 bpm   Confirmed by Nguyễn Funez ([id number]) on 3/17/2025 1:32:35 PM      ECG 12 lead   Final Result by Nguyễn Funez MD (03/17 3182)   Sinus rhythm with blocked PACs   Left bundle branch block   Abnormal ECG   When compared with ECG of 15-Mar-2025 10:17, (unconfirmed)   No significant change was found   Confirmed by Nguyễn Funez ([id number]) on 3/17/2025 1:39:27 PM      ECG 12 lead   Final Result by Nguyễn Funez MD (03/17 0992)   Sinus rhythm with Premature atrial complexes   Left  bundle branch block   Abnormal ECG   No previous ECGs available   Confirmed by Nguyễn Funez ([id number]) on 3/17/2025 1:42:30 PM      ECG 12 lead   Final Result by Jae Martines MD (03/15 0727)   Poor data quality, interpretation may be adversely affected   Possible Sinus rhythm with occasional Premature ectopic complexes   Incomplete left bundle branch block   Baseline Artifact   Abnormal ECG   Confirmed by Jae Martines ([id number]) on 3/15/2025 7:27:37 AM        Results from last 7 days   Lab Units 04/01/25  0547 03/31/25  0545 03/30/25  0607 03/29/25  0534 03/28/25  0447   WBC Thousand/uL 5.83 6.17 6.48 6.89 7.12   HEMOGLOBIN g/dL 10.5* 9.8* 10.1* 10.2* 10.2*   HEMATOCRIT % 34.7* 33.5* 32.8* 34.5* 33.1*   PLATELETS Thousands/uL 173 173 187 189 189   TOTAL NEUT ABS Thousands/µL 4.38 4.76 4.84 5.14 5.18       Results from last 7 days   Lab Units 04/01/25  0547 03/31/25  0636 03/30/25  0607 03/29/25  0534 03/28/25  0447 03/27/25  0433 03/26/25  0546   SODIUM mmol/L 136 137 138 139 136 137 137   POTASSIUM mmol/L 4.4 4.1 4.2 3.9 3.4* 3.9 4.1   CHLORIDE mmol/L 88* 86* 88* 90* 87* 88* 88*   CO2 mmol/L 43* >45* >45* 44* 44* 44* 44*   ANION GAP mmol/L 5  --   --  5 5 5 5   BUN mg/dL 37* 30* 27* 31* 35* 30* 28*   CREATININE mg/dL 0.96 0.87 0.76 0.82 0.78 0.89 0.63   EGFR ml/min/1.73sq m 81 89 94 91 93 88 101   CALCIUM mg/dL 9.0 9.1 9.0 9.0 8.6 8.7 8.5   MAGNESIUM mg/dL  --   --  2.0  --   --   --  2.1       Results from last 7 days   Lab Units 03/26/25  1703   POC GLUCOSE mg/dl 217*     Results from last 7 days   Lab Units 04/01/25  0547 03/31/25  0636 03/30/25  0607 03/29/25  0534 03/28/25  0447 03/27/25  0433 03/26/25  0546   GLUCOSE RANDOM mg/dL 91 177* 90 99 92 72 77                   Network Utilization Review Department  ATTENTION: Please call with any questions or concerns to [phone] and carefully listen to the prompts so that you are directed to the right person. All voicemails are confidential.   For Discharge needs,  contact Care Management DC Support Team at [phone] opt. 2  Send all requests for admission clinical reviews, approved or denied determinations and any other requests to dedicated fax number below belonging to the campus where the patient is receiving treatment. List of dedicated fax numbers for the Facilities:  FACILITY NAME UR FAX NUMBER   ADMISSION DENIALS (Administrative/Medical Necessity) [phone]   DISCHARGE SUPPORT TEAM (NETWORK) [phone]   PARENT CHILD HEALTH (Maternity/NICU/Pediatrics) [phone]   [facility] [phone]   [facility] [phone]   [facility] [phone]   [facility] [phone]   On license of [facility] [phone]   [facility] [phone]   [facility] [phone]   [facility] [phone]   [facility] [phone]   [facility] [phone]   [facility] [phone]   [facility] [phone]

## 2025-04-01 NOTE — ASSESSMENT & PLAN NOTE
Acute on Chronic Hypoxic Hypercapnic respiratory failure 2/2 COPD/CHF exacerbation S/P MICU admission for IV diuresis, intermittent steroid dosing. Transferred to medical floor.     Not a candidate for at-home My-Airvo 3 due to nocturnal Trilogy usage  BIPAP 15/6 at night and as needed.  Morphine PRN, C/W Fan therapy

## 2025-04-01 NOTE — PROGRESS NOTES
Advanced Heart Failure / Pulmonary Hypertension Service Progress Note    Natalio Hartmann Jr. 69 y.o. male   MRN: [id number]  Unit/Bed#: [facility] 431-01; Encounter: [id number]    Assessment:  Principal Problem:    Acute on chronic respiratory failure with hypoxia and hypercapnia (HCC)  Active Problems:    Obstructive sleep apnea    Goals of care, counseling/discussion    Acute on chronic systolic congestive heart failure (HCC)    SIRS (systemic inflammatory response syndrome)    COPD exacerbation (HCC)    Hyperlipidemia    Malnutrition (HCC)    Palliative care by specialist    Metabolic encephalopathy    Severe protein-calorie malnutrition (HCC)    69 y.o. male with a PMH of severe COPD, chronic hypoxic and hypercapnic respiratory failure on 4 L supplemental oxygen, cachexia, nonischemic cardiomyopathy, HFrEF -- who presented to the ER 3-14-25 with worsening shortness of breath, increasingly lethargic feeling, and fatigue. On day of admit, he was visibly short of breath slumped in the chair noted by his wife and brought to the ER. In the ED he was noted to be hypoxic and hypercapnic requiring BiPAP. BNP on admit>1000 (higher than priors). CXR showed pulmonary congestion. Unable to wean off Bipap--admitted to MICU. Heart failure team consulted. Transfered to floor 3-22-25.     Subjective:   Patient seen and examined. No significant events overnight. Worked with PT earlier today - he and his wife are happy with his progress.     Objective:   Intake/ Output: not kept  Weight: 93>95 lbs  Telemetry: reviewed  MAPs 70s    Today's Plan:  Continue torsemide 40 mg daily.  Euvolemic on exam today.  GDMT has been limited by low blood pressures.  Started on low-dose losartan, MAPs fluctuating but overall acceptable.  Encourage ambulation with PT.  As of last family meeting on 3/28, patient wishes to go home and remain full code. Appreciate palliative care recommendations.   Will arrange for outpatient follow up when  discharged. No contraindications for discharge from HF perspective. May be going to [facility] rehab per family.    Plan:  Acute on Chronic Respiratory Failure with hypoxia and hypercapnia  COPD exacerbation  -Former smoker; 105 pack years, quit in 2012.   -Multiple hospitalizations with acute COPD exacerbations   -Required BiPAP in the ER--CO2 on admit 140--> down to 81 3/19   -Multifactorial with COPD and CHF exacerbation contributing (COPD > CHF) +/- PNA  -treatment of COPD exacerbation per pulmonary / primary team  -completed treatment for possible PNA with ceftriaxone x 7 days  -oxygen requirement down to 3-4L NC over the last 3 days     HFrEF - acute on chronic, LVEF 25%, NYHA III, Stage C/D  Etiology:  Nonischemic cardiomyopathy.  Possible dyssynchrony from chronic LBBB.  Despite QRS only being 120 ms, echo shows significant dyssynchrony.      Studies     TTE 3/16/2025: LVEF 25%.  LVIDD 6.1 cm.  RV moderately dilated with normal normal systolic function.  Severe left atrial enlargement.  Mild TR.  RVSP 42 mmHg.  Mild MR.  RAP 15 mmHg.  No pericardial effusion.  TTE 9/6/24: LVEF 25%  TTE 5/1/23: LVEF 20%. Severe global hypokinesis with regional variation. RV cavity size normal, systolic function normal. Trace MR, TR.   TTE 2/21/23: LVEF 30%. LVIDd 6.6cm. severe global hypokinesis. Grade I DD. RV cavity size and systolic function normal. Mild TR.   TTE 2/12/2023: LVEF 20-25%.  Severe global hypokinesis with regional variation.  Grade 1 DD.  Abnormal septal motion consistent with LBBB.  RV cavity mildly dilated, normal systolic function.  Mild MR, mild TR.  RVSP 38.  Dilated IVC.  TTE 8/26/2022: LVEF 40%.  RV cavity mildly dilated.  Systolic function normal.  Mild MR, TR.  Normal IVC.     Neurohormonal Blockade: limited by low BP  --Beta-Blocker:   --ACEi, ARB or ARNi: losartan 12.5 mg QD    (or SVR reduction)  --Aldosterone Receptor Blocker:  --SGLT2-I:      Volume management:  --Home Diuretic: Torsemide 40  "mg alternating with 20 mg daily  --Inpatient diuretic: Furosemide 80 mg IV daily - transitioned to torsemide 40mg daily 3/25     Sudden Cardiac Death Risk Reduction:  --ICD: he does not have an ICD.   Severe lung disease with life expectancy < 1 year.     # Metabolic Encephalopathy (HCC)  - on admit likely d/t Hypercapnia, hypoxia     # Nonobstructive CAD  Has coronary calcium on CT  - on aspirin  - on pravastatin     # Hyperlipidemia  - on pravastatin     # KEVIN   On BiPAP nightly     # Cachexia  -BMI 16     # GOC.    -family meeting held 3/29. Palliative care team input noted. Patient remains full code and wants to try to go home.     Vitals:   Blood pressure 104/60, pulse 60, temperature (!) 97.4 °F (36.3 °C), temperature source Oral, resp. rate 18, height 5' 2\" (1.575 m), weight 43.2 kg (95 lb 3.2 oz), SpO2 98%.    Body mass index is 17.41 kg/m².    I/O last 3 completed shifts:  In: 540 [P.O.:540]  Out: 350 [Urine:350]    Wt Readings from Last 10 Encounters:   04/01/25 43.2 kg (95 lb 3.2 oz)   02/21/25 44.3 kg (97 lb 11.2 oz)   09/06/24 44.9 kg (99 lb)   08/13/24 45.2 kg (99 lb 9.6 oz)   07/29/24 44.7 kg (98 lb 9.6 oz)   04/15/24 44.4 kg (97 lb 12.8 oz)   03/11/24 45 kg (99 lb 4.8 oz)   02/04/24 45.4 kg (100 lb)   01/22/24 46.7 kg (103 lb)   01/10/24 43.5 kg (96 lb)     Vitals:    04/01/25 0247 04/01/25 0600 04/01/25 0723 04/01/25 0744   BP: 101/61   104/60   BP Location:    Right arm   Pulse: 79   60   Resp:    18   Temp:    (!) 97.4 °F (36.3 °C)   TempSrc:    Oral   SpO2: 97%  99% 98%   Weight:  43.2 kg (95 lb 3.2 oz)     Height:           Physical Exam  Constitutional:       Appearance: He is ill-appearing.   HENT:      Head: Normocephalic.      Nose: Nose normal.   Eyes:      Conjunctiva/sclera: Conjunctivae normal.   Neck:      Vascular: No JVD.   Cardiovascular:      Rate and Rhythm: Normal rate and regular rhythm.   Pulmonary:      Comments: Tachypneic   Musculoskeletal:      Cervical back: Neck supple.    "   Right lower leg: No edema.      Left lower leg: No edema.   Skin:     General: Skin is warm and dry.   Neurological:      General: No focal deficit present.      Mental Status: He is alert and oriented to person, place, and time.   Psychiatric:         Mood and Affect: Mood normal.         Behavior: Behavior normal.             Current Facility-Administered Medications:     acetaminophen (TYLENOL) tablet 650 mg, 650 mg, Oral, Q6H PRN, Richelle Martines MD    albumin human (FLEXBUMIN) 5 % injection 25 g, 25 g, Intravenous, Once PRN, Eliz Cm PA-C    albuterol inhalation solution 2.5 mg, 2.5 mg, Nebulization, Q4H PRN, Alessia Gauthier MD, 2.5 mg at 03/27/25 2240    aluminum-magnesium hydroxide-simethicone (MAALOX) oral suspension 30 mL, 30 mL, Oral, Q6H PRN, Richelle Martines MD    aspirin chewable tablet 81 mg, 81 mg, Oral, Daily, Richelle Martines MD, 81 mg at 04/01/25 0829    budesonide (PULMICORT) inhalation solution 0.5 mg, 0.5 mg, Nebulization, Q12H, Richelle Martines MD, 0.5 mg at 04/01/25 0722    calcium carbonate-vitamin D 500 mg-5 mcg tablet 1 tablet, 1 tablet, Oral, Daily With Breakfast, Jose Brown MD, 1 tablet at 04/01/25 0829    [Transfer Hold] chlorhexidine (PERIDEX) 0.12 % oral rinse 15 mL, 15 mL, Mouth/Throat, Q12H [facility], Nguyễn Liu DO, 15 mL at 03/22/25 0932    clotrimazole (LOTRIMIN) 1 % cream, , Topical, BID, Alessia Gauthier MD, Given at 04/01/25 0837    docusate sodium (COLACE) capsule 100 mg, 100 mg, Oral, BID, Richelle Martines MD, 100 mg at 04/01/25 0829    fluticasone (FLONASE) 50 mcg/act nasal spray 2 spray, 2 spray, Each Nare, Daily, Constantin Moreira MD, 2 spray at 04/01/25 0837    formoterol (PERFOROMIST) nebulizer solution 20 mcg, 20 mcg, Nebulization, Q12H, Richelle Martines MD, 20 mcg at 04/01/25 0722    heparin (porcine) subcutaneous injection 5,000 Units, 5,000 Units, Subcutaneous, Q8H [facility], Richelle Martines MD, 5,000 Units at 04/01/25 0532    ipratropium (ATROVENT) 0.02 % inhalation  solution 0.5 mg, 0.5 mg, Nebulization, Q6H, Alessia Gauthier MD, 0.5 mg at 04/01/25 0722    levalbuterol (XOPENEX) inhalation solution 1.25 mg, 1.25 mg, Nebulization, Q6H, Alessia Gauthier MD, 1.25 mg at 04/01/25 0722    losartan (COZAAR) tablet 12.5 mg, 12.5 mg, Oral, Daily, An Bautista MD, 12.5 mg at 03/31/25 1244    ondansetron (ZOFRAN) injection 4 mg, 4 mg, Intravenous, Q6H PRN, Richelle Martines MD    polyethylene glycol (MIRALAX) packet 17 g, 17 g, Oral, Daily, Richelle Martines MD, 17 g at 03/31/25 0845    predniSONE tablet 10 mg, 10 mg, Oral, Daily, Richelle Martines MD, 10 mg at 04/01/25 0829    torsemide (DEMADEX) tablet 40 mg, 40 mg, Oral, Daily, DELIA Dickinson, 40 mg at 04/01/25 0829    Labs & Results:      Results from last 7 days   Lab Units 04/01/25  0547 03/31/25  0545 03/30/25  0607   WBC Thousand/uL 5.83 6.17 6.48   HEMOGLOBIN g/dL 10.5* 9.8* 10.1*   HEMATOCRIT % 34.7* 33.5* 32.8*   PLATELETS Thousands/uL 173 173 187         Results from last 7 days   Lab Units 04/01/25  0547 03/31/25  0636 03/30/25  0607   POTASSIUM mmol/L 4.4 4.1 4.2   CHLORIDE mmol/L 88* 86* 88*   CO2 mmol/L 43* >45* >45*   BUN mg/dL 37* 30* 27*   CREATININE mg/dL 0.96 0.87 0.76   CALCIUM mg/dL 9.0 9.1 9.0             Thank you for the opportunity to participate in the care of this patient.    Chantel Ye PA-C  Advanced Heart Failure  [facility]

## 2025-04-01 NOTE — ARC ADMISSION
Patient appears appropriate for ARC pending medical readiness / continued functional need /insurance auth /  bed avail  -

## 2025-04-01 NOTE — PLAN OF CARE
Problem: OCCUPATIONAL THERAPY ADULT  Goal: Performs self-care activities at highest level of function for planned discharge setting.  See evaluation for individualized goals.  Description: Treatment Interventions: ADL retraining, Functional transfer training, UE strengthening/ROM, Endurance training, Cognitive reorientation, Patient/family training, Equipment evaluation/education, Compensatory technique education, Continued evaluation, Energy conservation, Activityengagement          See flowsheet documentation for full assessment, interventions and recommendations.   Outcome: Progressing  Note: Limitation: Decreased ADL status, Decreased UE strength, Decreased Safe judgement during ADL, Decreased endurance, Decreased self-care trans, Decreased high-level ADLs  Prognosis: Fair  Assessment: Pt was seen on 4/1/2025 to address ADL retraining, functional transfer training, and activity tolerance/endurance. Pt with active OT orders and activity orders. Pt demonstrating improvements and currently requires MIN A to complete functional transfers and ambulate short household distances with rw and seated break. Pt requires MOD A to don gown and complete humaira hygiene. Pt is limited by decreased ADL status, functional transfers, functional mobility, and activity tolerance. Pt supine in bed at beginning of session and seated in bedside chair at end of session with alarm set and all items within reach. The patient's raw score on the AM-PAC Daily Activity Inpatient Short Form is 14. A raw score of less than 19 suggests the patient may benefit from discharge to post-acute rehabilitation services. Please refer to the recommendation of the Occupational Therapist for safe discharge planning. Recommend Level II moderate intensity OT services at d/c to maximize pt function.     Rehab Resource Intensity Level, OT: II (Moderate Resource Intensity)

## 2025-04-01 NOTE — PROGRESS NOTES
Progress Note - Pulmonology   Name: Natalio Hartmann Jr. 69 y.o. male I MRN: [id number]  [facility] I Date of Admission: 3/14/2025   Date of Service: 3/31/2025 I Hospital Day: 17     Assessment & Plan  Acute on chronic respiratory failure with hypoxia and hypercapnia (HCC)  Acute on Chronic Hypoxic Hypercapnic respiratory failure 2/2 COPD/CHF exacerbation S/P MICU admission for IV diuresis, intermittent steroid dosing. Transferred to medical floor.     Not a candidate for at-home My-Airvo 3 due to nocturnal Trilogy usage  BIPAP 15/6 at night and as needed.  Morphine PRN, C/W Fan therapy  COPD exacerbation (HCC)  Fully resolved at this point  Baseline FEV1 22%  Baseline O2 requirement 4L with nocutrnal Trilogy  Continue nebulized bronchodilators budesonide, formoterol  Pulmonary toileting  Continue with prednisone 10 mg daily (baseline outpt steroid dosing)  Obstructive sleep apnea  On Trilogy NIV at night at home  C/W BIPAP as above  Goals of care, counseling/discussion  Family meeting 3/28 - concluded patient will work with therapy for 2 additional days but wants to go home on Monday morning 3/31.   Palliative care by specialist  Palliative team following    24 Hour Events : No acute events overnight  Subjective : Patient seen and examined at bedside.  Seen on BiPAP tolerating well. Did not work with PT/OT yesterday because he said that he felt tired will work with then today after having his breakfast. Feels hungry.    Objective :  Temp:  [97.4 °F (36.3 °C)-98.1 °F (36.7 °C)] 97.5 °F (36.4 °C)  HR:  [54-86] 69  BP: ()/(47-63) 86/47  Resp:  [18] 18  SpO2:  [93 %-100 %] 99 %  O2 Device: Nasal cannula  Nasal Cannula O2 Flow Rate (L/min):  [4 L/min] 4 L/min  FiO2 (%):  [35] 35    Physical Exam  Vitals reviewed.   Constitutional:       Comments: Markedly cachectic   HENT:      Head: Normocephalic and atraumatic.   Cardiovascular:      Rate and Rhythm: Normal rate and regular rhythm.   Pulmonary:       Effort: Pulmonary effort is normal. No respiratory distress.      Comments: Diminished breath sounds throughout  Musculoskeletal:      Right lower leg: No edema.      Left lower leg: No edema.   Neurological:      Mental Status: He is alert.         Lab Results: I have reviewed the following results:   .     03/31/25  0545 03/31/25  0636   WBC 6.17  --    HGB 9.8*  --    HCT 33.5*  --      --    SODIUM  --  137   K  --  4.1   CL  --  86*   CO2  --  >45*   BUN  --  30*   CREATININE  --  0.87   GLUC  --  177*     ABG: No new results in last 24 hours.    Imaging Results Review: No pertinent imaging studies reviewed.  Other Study Results Review: No additional pertinent studies reviewed.  PFT Results Reviewed: reviewed

## 2025-04-01 NOTE — ASSESSMENT & PLAN NOTE
SIRS present on admission, patient initially covered for suspected pneumonia. Symptoms felt more likely to be related to CHF, and abx stopped after 4 days  Blood cultures negative  Monitor off antibiotics  Resolved

## 2025-04-01 NOTE — PLAN OF CARE
Problem: PHYSICAL THERAPY ADULT  Goal: Performs mobility at highest level of function for planned discharge setting.  See evaluation for individualized goals.  Description: Treatment/Interventions: ADL retraining, Functional transfer training, LE strengthening/ROM, Elevations, Therapeutic exercise, Endurance training, Cognitive reorientation, Patient/family training, Equipment eval/education, Gait training, Bed mobility, Spoke to MD, Spoke to nursing, Spoke to case management, OT (Pt was seen in conjunction w/ OT 2* unstable presentation; medical complexity; new precautions/ limitations + limited activity tolerance and regression from baseline functional mobility.)  Equipment Recommended: Walker       See flowsheet documentation for full assessment, interventions and recommendations.  Outcome: Progressing  Note: Prognosis: Fair  Problem List: Decreased strength, Decreased endurance, Impaired balance, Decreased mobility, Decreased coordination, Pain, Decreased cognition  Assessment: Pt seen for session for setup, bed mob, time spent EOB, standing trials, gait w/ extended rest time, repositioning.  Pt cooperative w/ session w/ encouragement, c/o significant SOB and fatigue throughout.  Pt needed a bit less physical assist w/ mobility tasks, but most limited by endurance and mobility tolerance.  needs frequent rests throughout.  continue to lean towards Level II (moderate) post acute care therapy needs.  Barriers to Discharge: Inaccessible home environment, Decreased caregiver support  Barriers to Discharge Comments: CARLO home; alone at times  Rehab Resource Intensity Level, PT: II (Moderate Resource Intensity)    See flowsheet documentation for full assessment.

## 2025-04-01 NOTE — ASSESSMENT & PLAN NOTE
Malnutrition Findings:   Adult Malnutrition type: Chronic illness  Adult Degree of Malnutrition: Other severe protein calorie malnutrition  Malnutrition Characteristics: Fat loss, Muscle loss              360 Statement: severe malnutrition r/t chronic illness as evidenced by severe muscle loss around clavicles and shoulders, moderate-severe muscle loss in temples, severe buccal fat pad loss, apparent depression between ribs suggesting severe fat loss. Treatment: oral diet and oral nutrition supplements  BMI Findings:         Body mass index is 17.41 kg/m².

## 2025-04-01 NOTE — ASSESSMENT & PLAN NOTE
"Palliative Diagnosis: end stage COPD    Goals:   Patient continues to have competing goals.  He does not find his quality of life in hospital to be acceptable, but also, does not wish to engage in any end of life cares or set any limits on cares.  Explicitly declines hospice.  This is similar to previous conversations our team has had during his healthcare journey.  Nevertheless, he was more welcoming of our team than previously and if he continues to allow it I believe he would benefit from ongoing rapport building and Palliative Care support.    Patient agreeable to referrals for short term acute rehab at [facility] or [facility] (tentatively approved pending insurance auth)  Spouse, Adelita, shares patient's goals but is also insightful to poor prognosis and is open to discussing hospice in the future pending course.  See ACP note dated 3/28 regarding family meeting  Palliative will follow for ongoing goals of care discussions as situation evolves.    Social Support:  Patient's support system: spouse, son  Supportive listening provided specifically to spouse, Adelita, at bedside  Normalized experience of patient  Advocated for patient/family with interdisciplinary team    Care Coordination  Case discussed with CM    Follow-up  We appreciate the opportunity to participate in this patient's care.   We will continue to follow while admitted.  Patient is eligible for the palliative home program upon discharge  Please do not hesitate to contact our on-call provider through EPIC Secure Chat or contact [phone] should there be an acute change or other symptom control concerns.    Symptom Mgmt:  Patient declines symptomatic management on dyspnea on the basis of  \"morphine killed [his] mother\" and he declines to take \"any of those medications.\"       Please do not make any changes to symptom medications (opioid analgesics, nonopioid analgesics, antiemetics, etc) without first consulting the on-call Palliative Care provider for your " specific campus; including after hours and on weekends. We are available 24/7, and can be contacted on Epic secure chat or at [phone].

## 2025-04-01 NOTE — PROGRESS NOTES
"Progress Note - Palliative Care   Name: Natalio Hartmann Jr. 69 y.o. male I MRN: [id number]  [facility] I Date of Admission: 3/14/2025   Date of Service: 4/1/2025 I Hospital Day: 18     Assessment & Plan  Palliative care by specialist  Palliative Diagnosis: end stage COPD    Goals:   Patient continues to have competing goals.  He does not find his quality of life in hospital to be acceptable, but also, does not wish to engage in any end of life cares or set any limits on cares.  Explicitly declines hospice.  This is similar to previous conversations our team has had during his healthcare journey.  Nevertheless, he was more welcoming of our team than previously and if he continues to allow it I believe he would benefit from ongoing rapport building and Palliative Care support.    Patient agreeable to referrals for short term acute rehab at [facility] or [facility] (tentatively approved pending insurance auth)  Spouse, Adelita, shares patient's goals but is also insightful to poor prognosis and is open to discussing hospice in the future pending course.  See ACP note dated 3/28 regarding family meeting  Palliative will follow for ongoing goals of care discussions as situation evolves.    Social Support:  Patient's support system: spouse, son  Supportive listening provided specifically to spouse, Adelita, at bedside  Normalized experience of patient  Advocated for patient/family with interdisciplinary team    Care Coordination  Case discussed with CM    Follow-up  We appreciate the opportunity to participate in this patient's care.   We will continue to follow while admitted.  Patient is eligible for the palliative home program upon discharge  Please do not hesitate to contact our on-call provider through EPIC Secure Chat or contact [phone] should there be an acute change or other symptom control concerns.    Symptom Mgmt:  Patient declines symptomatic management on dyspnea on the basis of  \"morphine killed [his] mother\" " "and he declines to take \"any of those medications.\"       Please do not make any changes to symptom medications (opioid analgesics, nonopioid analgesics, antiemetics, etc) without first consulting the on-call Palliative Care provider for your specific campus; including after hours and on weekends. We are available 24/7, and can be contacted on Epic secure chat or at [phone].    Acute on chronic respiratory failure with hypoxia and hypercapnia (HCC)  Pulmonary following for end stage COPD  Patient continues to alternate between supplemental O2 and BiPAP, both of which he is accustomed to using at home  Experiencing pulmonary cachexia but has a good appetite  Continue fan for dyspnea  Patient has declined symptomatic treatment of dyspnea with opioids reporting \"morphine killed\" his mother  Goals of care, counseling/discussion  See ACP note dated 3/28.   Patient is not ready to forego further hospitalizations or medical treatment at this time  His primary goal is to return home, but willing to await rehab determinations. He is only open to [facility] or [facility] for STR.  Patient continues to request full code, full cares  Severe protein-calorie malnutrition (HCC)  Continues to have good appetite, picture consistent with pulmonary cachexia    Malnutrition Findings:   Adult Malnutrition type: Chronic illness  Adult Degree of Malnutrition: Other severe protein calorie malnutrition  Malnutrition Characteristics: Fat loss, Muscle loss                  360 Statement: severe malnutrition r/t chronic illness as evidenced by severe muscle loss around clavicles and shoulders, moderate-severe muscle loss in temples, severe buccal fat pad loss, apparent depression between ribs suggesting severe fat loss. Treatment: oral diet and oral nutrition supplements    BMI Findings:           Body mass index is 17.41 kg/m².     Acute on chronic systolic congestive heart failure (HCC)  Wt Readings from Last 3 Encounters:   04/01/25 43.2 kg (95 lb " 3.2 oz)   02/21/25 44.3 kg (97 lb 11.2 oz)   09/06/24 44.9 kg (99 lb)     Clear from heart failure perspective for discharge        Obstructive sleep apnea    COPD exacerbation (HCC)    Interval history:       No events overnight. Patient is tired during time of encounter following participation with PT/OT. He was able to walk in the [place] after breakfast. Currently on BiPAP. Provided support to spouse at bedside.    MEDICATIONS / ALLERGIES:     all current active meds have been reviewed    No Known Allergies    OBJECTIVE:    Physical Exam  Physical Exam  Constitutional:       General: He is not in acute distress.  HENT:      Head: Atraumatic.   Pulmonary:      Comments: Currently on BiPAP  Abdominal:      Tenderness: There is no guarding.   Musculoskeletal:         General: No swelling.   Skin:     General: Skin is warm and dry.   Neurological:      General: No focal deficit present.      Mental Status: Mental status is at baseline.   Psychiatric:      Comments: calm         Lab Results:   Results from last 7 days   Lab Units 04/01/25  0547 03/31/25  0545 03/30/25  0607   WBC Thousand/uL 5.83 6.17 6.48   HEMOGLOBIN g/dL 10.5* 9.8* 10.1*   HEMATOCRIT % 34.7* 33.5* 32.8*   PLATELETS Thousands/uL 173 173 187   SEGS PCT % 75 77* 75   MONO PCT % 12 10 12   EOS PCT % 1 1 1     Results from last 7 days   Lab Units 04/01/25  0547 03/31/25  0636 03/30/25  0607   POTASSIUM mmol/L 4.4 4.1 4.2   CHLORIDE mmol/L 88* 86* 88*   CO2 mmol/L 43* >45* >45*   BUN mg/dL 37* 30* 27*   CREATININE mg/dL 0.96 0.87 0.76   CALCIUM mg/dL 9.0 9.1 9.0       Imaging Studies: reviewed pertinent studies   EKG, Pathology, and Other Studies: reviewed pertinent studies    Counseling / Coordination of Care    Total floor / unit time spent today 35+ minutes. Greater than 50% of total time was spent with the patient and / or family counseling and / or coordination of care. A description of the counseling / coordination of care: medication review,  psychosocial support, chart review, lab review, goals of care, supportive listening, anticipatory guidance, and coordination with spouse at bedside and CM.

## 2025-04-01 NOTE — CASE MANAGEMENT
Case Management Discharge Planning Note    Patient name Natalio Hartmann Jr.  [facility] MRN [id number]  : 1955 Date 2025       Current Admission Date: 3/14/2025  Current Admission Diagnosis:Acute on chronic respiratory failure with hypoxia and hypercapnia (HCC)   Patient Active Problem List    Diagnosis Date Noted Date Diagnosed    Iron deficiency anemia secondary to inadequate dietary iron intake 2024     SIADH (syndrome of inappropriate ADH production) ([facility]) 2024     Type 2 diabetes mellitus without complication, with long-term current use of insulin ([facility]) 2024     End stage COPD ([facility]) 2024     Dependence on respirator (ventilator) status ([facility]) 01/10/2024     Severe protein-calorie malnutrition ([facility]) 10/14/2023     History of bladder cancer 2023     Pulmonary cachexia due to COPD ([facility])      Chronic hypercapnia/KEVIN 2023     Metabolic encephalopathy 2023     Palliative care by specialist 2023     Alkalosis 2023     Malnutrition ([facility]) 06/10/2023     Hyperlipidemia 2023     COPD exacerbation ([facility]) 2023     Steroid-induced hyperglycemia 2023     Physical deconditioning 2023     Chronic anemia 2023     SIRS (systemic inflammatory response syndrome) 2023     Hyponatremia 2023     Acute on chronic systolic congestive heart failure ([facility]) 2022     Pulmonary nodules/lesions, multiple 2022     Prostate cancer ([facility]) 2021     BPH with obstruction/lower urinary tract symptoms 2021     Goals of care, counseling/discussion 2021     Acute on chronic respiratory failure with hypoxia and hypercapnia ([facility]) 2020     Macrocytosis without anemia 2019     Other insomnia 2019     GERD (gastroesophageal reflux disease) 2017     Nonobstructive atherosclerosis of coronary artery 2016     Mood disorder ([facility]) 2015     Prediabetes 2015      Osteoporosis 10/14/2014     Vitamin D deficiency 10/14/2014     Nonischemic cardiomyopathy (HCC) 09/26/2014     Left bundle-branch block 08/03/2013     Osteoarthritis 08/03/2013     Obstructive sleep apnea 07/29/2013       LOS (days): 18  Geometric Mean LOS (GMLOS) (days): 3.9  Days to GMLOS:-14.1     OBJECTIVE:  Risk of Unplanned Readmission Score: 23.99         Current admission status: Inpatient   Preferred Pharmacy:   [id number] - [place], [place] - [address]  [address]  [place] [address]  Phone: [phone] Fax: [phone]    Primary Care Provider: Fatmata Gustafson DO    Primary Insurance: BLUE CROSS  Secondary Insurance: MEDICARE    DISCHARGE DETAILS:    Discharge planning discussed with:: Patient  Freedom of Choice: Yes  Comments - Freedom of Choice: CM met with patient at bedside and explained that [facility] and [facility] have accepted him. CM explained that his insurance will need to approve him to go to acute rehab. CM asked patient which facility he wanted to go to. Patient requested that CM call his wife to discuss options with her. CM called both numbers list for patient's spouse and left message for call back.  CM contacted family/caregiver?: No- see comments (CM left message for patient's spouse)

## 2025-04-01 NOTE — ASSESSMENT & PLAN NOTE
History of COPD and CHF, baseline O2 needs of 4 L via nasal cannula  Uses trilogy NIV at night and is on prednisone 10 mg daily chronically  Admitted to ICU for IV diuresis and respiratory support with BiPAP, steroids, nebulizers,  improved and tansferred to floor on 3/22  Most likely due to severe COPD  Pulmonology following  Pulmonary following, continues to need intermittent BiPAP versus high flow nasal cannula due to dyspnea   Continue to encourage to work with PT  Patient and his wife are now open to [facility] or [facility]; referral requested by case management, still full code, and not open to hospice

## 2025-04-01 NOTE — WOUND OSTOMY CARE
Progress Note - Wound   Natalio Hartmann Jr. 69 y.o. male MRN: [id number]  Unit/Bed#: [facility] 431-01 Encounter: [id number]        Assessment:   Patient is seen for wound care follow-up. Seen sitting OOB in recliner. Family member present at bedside. Bi Pap face mask in place on assistance- primary provided assistance with face shield/mask removal.     Findings:  B/L heels are dry intact and hernán with no skin loss or wounds present. Recommend preventative Hydraguard Cream and proper offloading/ repositioning.  Patient declined assessment of sacro-buttocks- he reports no skin loss or wounds to the area.     HA Bridge of Nose Unstageable: resolved. Area now with intact, pink epithelial tissue. No skin loss or drainage noted. Recommend leaving QUE.  Left Plantar Foot: resolved. Foot now is intact with pink in color fungal rash. Recommend continuing with fungal cream as ordered.           No induration, fluctuance, odor, warmth/temperature differences, redness, or purulence noted to the above noted wounds and skin areas assessed. New dressings applied per orders listed below. Patient tolerated well- no s/s of non-verbal pain or discomfort observed during the encounter. Bedside nurse aware of plan of care. See flow sheets for more detailed assessment findings.      Orders listed below and wound care will sign off, call or Secure Chat Message with questions.     Skin Care Plan:  1-Anterior Bridge of Nose Wound: If using smaller BIPAP mask without full face shield, please apply a hydrocolloid dressing to the bridge of the patient's nose for pressure relief. Change every pother day or PRN  2-Turn/reposition q2h or when medically stable for pressure re-distribution on skin.  3-Elevate heels to offload pressure.  4-Moisturize skin daily with skin nourishing cream  5-Ehob cushion in chair when out of bed.  6-Preventative Hydraguard to bilateral sacro-buttocks and heels BID and PRN.   7-B/L foot- Cleanse with soap and water, pat dry.  Apply antifungal cream as ordered.        WOUNDS:  Wound 03/18/25 Pressure Injury Nose (Active)   Wound Image   04/01/25 1057   Wound Description Intact;Pink 04/01/25 1400   Pressure Injury Stage O 04/01/25 1400   Non-staged Wound Description Not applicable 04/01/25 1400   Wound Length (cm) 0 cm 04/01/25 1400   Wound Width (cm) 0 cm 04/01/25 1400   Wound Depth (cm) 0 cm 04/01/25 1400   Wound Surface Area (cm^2) 0 cm^2 04/01/25 1400   Wound Volume (cm^3) 0 cm^3 04/01/25 1400   Calculated Wound Volume (cm^3) 0 cm^3 04/01/25 1400   Change in Wound Size % 100 04/01/25 1400   Drainage Amount None 04/01/25 1400   Drainage Description Other (Comment) 04/01/25 1400   Dilcia-wound Assessment Intact 04/01/25 1400   Treatments Cleansed;Site care 03/29/25 0745   Dressing Open to air 04/01/25 1400   Wound packed? No 04/01/25 1400   Dressing Changed Other (Comment) 04/01/25 1400   Patient Tolerance Tolerated well 04/01/25 1400   Dressing Status Other (Comment) 04/01/25 1400       Wound 03/25/25 Foot Left;Plantar (Active)   Wound Image   04/01/25 1058   Wound Description Intact;Dry 04/01/25 1058   Non-staged Wound Description Not applicable 04/01/25 1058   Wound Length (cm) 0 cm 04/01/25 1058   Wound Width (cm) 0 cm 04/01/25 1058   Wound Depth (cm) 0 cm 04/01/25 1058   Wound Surface Area (cm^2) 0 cm^2 04/01/25 1058   Wound Volume (cm^3) 0 cm^3 04/01/25 1058   Calculated Wound Volume (cm^3) 0 cm^3 04/01/25 1058   Drainage Amount None 04/01/25 1058   Dilcia-wound Assessment Intact;Rash 04/01/25 1058   Treatments Cleansed;Site care 04/01/25 1058   Wound Site Closure None 04/01/25 1058   Dressing Moisture barrier 04/01/25 1058   Wound packed? No 04/01/25 1058                Malia Mcnulty RN, BSN, CWOCN

## 2025-04-01 NOTE — PROGRESS NOTES
Progress Note - Hospitalist   Name: Natalio Hartmann Jr. 69 y.o. male I MRN: [id number]  Unit/Bed#: [facility] 431-01 I Date of Admission: 3/14/2025   Date of Service: 4/1/2025 I Hospital Day: 18    Assessment & Plan  Acute on chronic respiratory failure with hypoxia and hypercapnia (HCC)  History of COPD and CHF, baseline O2 needs of 4 L via nasal cannula  Uses trilogy NIV at night and is on prednisone 10 mg daily chronically  Admitted to ICU for IV diuresis and respiratory support with BiPAP, steroids, nebulizers,  improved and tansferred to floor on 3/22  Most likely due to severe COPD  Pulmonology following  Pulmonary following, continues to need intermittent BiPAP versus high flow nasal cannula due to dyspnea   Continue to encourage to work with PT  Patient and his wife are now open to [facility] or [facility]; referral requested by case management, still full code, and not open to hospice  Acute on chronic systolic congestive heart failure (HCC)  Wt Readings from Last 3 Encounters:   04/01/25 43.2 kg (95 lb 3.2 oz)   02/21/25 44.3 kg (97 lb 11.2 oz)   09/06/24 44.9 kg (99 lb)   Known history of nonischemic cardiomyopathy with LVEF 25%  Presents with worsening shortness of breath volume overload state noted  transitioned to PO diuretics on 3/25- torsemide 40 mg daily  I's/O, daily weight  COPD exacerbation (HCC)  Monitored by pulmonology during hospitalization.  Low concern for acute exacerbation  Continue inhaler regimen and prednisone 10 mg daily  SIRS (systemic inflammatory response syndrome)  SIRS present on admission, patient initially covered for suspected pneumonia. Symptoms felt more likely to be related to CHF, and abx stopped after 4 days  Blood cultures negative  Monitor off antibiotics  Resolved  Obstructive sleep apnea  BiPAP as needed and at bedtime, patient appears to be tolerating  Metabolic encephalopathy  resolved  Secondary to respiratory failure which is multifactorial related to COPD and CHF.  Probable  hypoxia and hypercapnia component as well  Status appears to be improving with nocturnal BIPAP  Continue supportive measures and monitor  Severe protein-calorie malnutrition (HCC)  Malnutrition Findings:     Body mass index is 17.41 kg/m².   Encourage adequate protein and calorie intake  Goals of care, counseling/discussion  Patient with multiple comorbidities and overall guarded prognosis.  Still continues Level 1 Full Code at this time as per family wishes  Palliative care team following, involvement appreciated  Hyperlipidemia  Resume Pravachol 80mg po qd  Malnutrition (HCC)  Malnutrition Findings:   Adult Malnutrition type: Chronic illness  Adult Degree of Malnutrition: Other severe protein calorie malnutrition  Malnutrition Characteristics: Fat loss, Muscle loss              360 Statement: severe malnutrition r/t chronic illness as evidenced by severe muscle loss around clavicles and shoulders, moderate-severe muscle loss in temples, severe buccal fat pad loss, apparent depression between ribs suggesting severe fat loss. Treatment: oral diet and oral nutrition supplements  BMI Findings:         Body mass index is 17.41 kg/m².   Palliative care by specialist  Appreciate palliative team input    VTE Pharmacologic Prophylaxis: VTE Score: 8  ordered    Mobility:   Basic Mobility Inpatient Raw Score: 17  JH-HLM Goal: 5: Stand one or more mins  JH-HLM Achieved: 6: Walk 10 steps or more  JH-HLM Goal achieved. Continue to encourage appropriate mobility.    Patient Centered Rounds: I performed bedside rounds with nursing staff today.   Discussions with Specialists or Other Care Team Provider: Case management    Education and Discussions with Family / Patient: Updated  (wife) at bedside.    Current Length of Stay: 18 day(s)  Current Patient Status: Inpatient   Certification Statement: The patient will continue to require additional inpatient hospital stay due to placement  Discharge Plan: Medically stable  for discharge    Code Status: Level 1 - Full Code    Subjective   Patient seen and examined.  No acute complaints.  Breathing at baseline.    Objective :  Temp:  [97.4 °F (36.3 °C)-98 °F (36.7 °C)] 98 °F (36.7 °C)  HR:  [54-79] 74  BP: ()/(47-66) 112/61  Resp:  [18] 18  SpO2:  [93 %-99 %] 98 %  O2 Device: BiPAP  Nasal Cannula O2 Flow Rate (L/min):  [4 L/min] 4 L/min  FiO2 (%):  [35] 35    Body mass index is 17.41 kg/m².     Input and Output Summary (last 24 hours):     Intake/Output Summary (Last 24 hours) at 4/1/2025 1211  Last data filed at 4/1/2025 0900  Gross per 24 hour   Intake 300 ml   Output 0 ml   Net 300 ml       Physical Exam  Vitals and nursing note reviewed.   Cardiovascular:      Rate and Rhythm: Normal rate and regular rhythm.   Pulmonary:      Effort: No respiratory distress.      Comments: Diminished breath sounds bilaterally  Abdominal:      General: Bowel sounds are normal.      Palpations: Abdomen is soft.   Neurological:      Mental Status: He is alert.           Lines/Drains:  Lines/Drains/Airways       Active Status       Name Placement date Placement time Site Days    External Urinary Catheter 03/21/25  1300  -- 10                      Telemetry:  Telemetry Orders (From admission, onward)               24 Hour Telemetry Monitoring  Continuous x 24 Hours (Telem)        Expiring   Question:  Reason for 24 Hour Telemetry  Answer:  Decompensated CHF- and any one of the following: continuous diuretic infusion or total diuretic dose >200 mg daily, associated electrolyte derangement (I.e. K < 3.0), inotropic drip (continuous infusion), hx of ventricular arrhythmia, or new EF < 35%                     Telemetry Reviewed:                Lab Results: I have reviewed the following results:   Results from last 7 days   Lab Units 04/01/25  0547   WBC Thousand/uL 5.83   HEMOGLOBIN g/dL 10.5*   HEMATOCRIT % 34.7*   PLATELETS Thousands/uL 173   SEGS PCT % 75   LYMPHO PCT % 11*   MONO PCT % 12   EOS PCT  % 1     Results from last 7 days   Lab Units 04/01/25  0547   SODIUM mmol/L 136   POTASSIUM mmol/L 4.4   CHLORIDE mmol/L 88*   CO2 mmol/L 43*   BUN mg/dL 37*   CREATININE mg/dL 0.96   ANION GAP mmol/L 5   CALCIUM mg/dL 9.0   GLUCOSE RANDOM mg/dL 91         Results from last 7 days   Lab Units 03/26/25  1703   POC GLUCOSE mg/dl 217*               Recent Cultures (last 7 days):         Imaging Results Review: I reviewed radiology reports from this admission including: chest xray.  Other Study Results Review: No additional pertinent studies reviewed.    Last 24 Hours Medication List:     Current Facility-Administered Medications:     acetaminophen (TYLENOL) tablet 650 mg, Q6H PRN    albumin human (FLEXBUMIN) 5 % injection 25 g, Once PRN    albuterol inhalation solution 2.5 mg, Q4H PRN    aluminum-magnesium hydroxide-simethicone (MAALOX) oral suspension 30 mL, Q6H PRN    aspirin chewable tablet 81 mg, Daily    budesonide (PULMICORT) inhalation solution 0.5 mg, Q12H    calcium carbonate-vitamin D 500 mg-5 mcg tablet 1 tablet, Daily With Breakfast    [Transfer Hold] chlorhexidine (PERIDEX) 0.12 % oral rinse 15 mL, Q12H GABE    clotrimazole (LOTRIMIN) 1 % cream, BID    docusate sodium (COLACE) capsule 100 mg, BID    fluticasone (FLONASE) 50 mcg/act nasal spray 2 spray, Daily    formoterol (PERFOROMIST) nebulizer solution 20 mcg, Q12H    heparin (porcine) subcutaneous injection 5,000 Units, Q8H GABE    ipratropium (ATROVENT) 0.02 % inhalation solution 0.5 mg, Q6H    levalbuterol (XOPENEX) inhalation solution 1.25 mg, Q6H    losartan (COZAAR) tablet 12.5 mg, Daily    ondansetron (ZOFRAN) injection 4 mg, Q6H PRN    polyethylene glycol (MIRALAX) packet 17 g, Daily    predniSONE tablet 10 mg, Daily    torsemide (DEMADEX) tablet 40 mg, Daily    Administrative Statements   Today, Patient Was Seen By: Remi Rodriguez MD  I have spent a total time of 35 minutes in caring for this patient on the day of the visit/encounter including  Diagnostic results, Documenting in the medical record, Reviewing/placing orders in the medical record (including tests, medications, and/or procedures), Obtaining or reviewing history  , and Communicating with other healthcare professionals .    **Please Note: This note may have been constructed using a voice recognition system.**

## 2025-04-01 NOTE — PHYSICAL THERAPY NOTE
"   Physical Therapy Treatment Note       04/01/25 1000   PT Last Visit   PT Visit Date 04/01/25   Note Type   Note Type Treatment for insurance authorization   Pain Assessment   Pain Assessment Tool 0-10   Pain Score No Pain   Restrictions/Precautions   Weight Bearing Precautions Per Order No   Other Precautions Multiple lines;Telemetry;Fall Risk;Pain;Cognitive;Chair Alarm;Bed Alarm  (chair alarm armed post session)   General   Chart Reviewed Yes   Family/Caregiver Present Yes  (wife)   Cognition   Overall Cognitive Status Impaired   Arousal/Participation Responsive   Attention Attends with cues to redirect   Orientation Level Oriented to person;Oriented to place   Memory Unable to assess   Following Commands Follows one step commands inconsistently   Subjective   Subjective \"I need more air.\"   Bed Mobility   Supine to Sit 5  Supervision   Additional items Assist x 1;Increased time required;Impulsive;Verbal cues   Additional Comments sat EOB x several minutes prior to gait   Transfers   Sit to Stand 4  Minimal assistance   Additional items Assist x 1;Increased time required;Verbal cues;Impulsive   Stand to Sit 5  Supervision   Additional items Assist x 1;Increased time required;Impulsive;Verbal cues   Additional Comments stood at bed x several minutes prior to gait to clean pt.   Ambulation/Elevation   Gait pattern   (slow, ataxia, short step length, forward flexion, wide CARLY)   Gait Assistance 4  Minimal assist   Additional items Assist x 1   Assistive Device Rolling walker   Distance 60'+40' w/ extended seated rest.  time spent post session to reposition   Balance   Static Sitting Good   Dynamic Sitting Fair   Static Standing Fair -   Dynamic Standing Poor +   Ambulatory Poor +   Endurance Deficit   Endurance Deficit Yes   Endurance Deficit Description fatigue, weakness   Activity Tolerance   Activity Tolerance Patient tolerated treatment well;Patient limited by fatigue;Patient limited by pain;Treatment limited " secondary to medical complications (Comment)   Nurse Made Aware yes   Assessment   Prognosis Fair   Problem List Decreased strength;Decreased endurance;Impaired balance;Decreased mobility;Decreased coordination;Pain;Decreased cognition   Assessment Pt seen for session for setup, bed mob, time spent EOB, standing trials, gait w/ extended rest time, repositioning.  Pt cooperative w/ session w/ encouragement, c/o significant SOB and fatigue throughout.  Pt needed a bit less physical assist w/ mobility tasks, but most limited by endurance and mobility tolerance.  needs frequent rests throughout.  continue to lean towards Level II (moderate) post acute care therapy needs.   Goals   Patient Goals to rest   STG Expiration Date 04/03/25   PT Treatment Day 4   Plan   Treatment/Interventions Functional transfer training;LE strengthening/ROM;Endurance training;Elevations;Therapeutic exercise;Patient/family training;Equipment eval/education;Bed mobility;Gait training   Progress Slow progress, medical status limitations   PT Frequency 3-5x/wk   Discharge Recommendation   Rehab Resource Intensity Level, PT II (Moderate Resource Intensity)   Equipment Recommended Walker   Walker Package Recommended Wheeled walker   Change/add to Walker Package? No   AM-PAC Basic Mobility Inpatient   Turning in Flat Bed Without Bedrails 3   Lying on Back to Sitting on Edge of Flat Bed Without Bedrails 3   Moving Bed to Chair 3   Standing Up From Chair Using Arms 3   Walk in Room 2   Climb 3-5 Stairs With Railing 2   Basic Mobility Inpatient Raw Score 16   Basic Mobility Standardized Score 38.32   [facility] Highest Level Of Mobility   -HLM Goal 5: Stand one or more mins   JH-HLM Achieved 7: Walk 25 feet or more     Baldev Jaime PT, DPT, GCS, CSRS

## 2025-04-01 NOTE — ASSESSMENT & PLAN NOTE
Wt Readings from Last 3 Encounters:   04/01/25 43.2 kg (95 lb 3.2 oz)   02/21/25 44.3 kg (97 lb 11.2 oz)   09/06/24 44.9 kg (99 lb)   Known history of nonischemic cardiomyopathy with LVEF 25%  Presents with worsening shortness of breath volume overload state noted  transitioned to PO diuretics on 3/25- torsemide 40 mg daily  I's/O, daily weight

## 2025-04-01 NOTE — OCCUPATIONAL THERAPY NOTE
Occupational Therapy Progress Note     Patient Name: Natalio Hartmann Jr.  Today's Date: 4/1/2025  Problem List  Principal Problem:    Acute on chronic respiratory failure with hypoxia and hypercapnia (HCC)  Active Problems:    Obstructive sleep apnea    Goals of care, counseling/discussion    Acute on chronic systolic congestive heart failure (HCC)    SIRS (systemic inflammatory response syndrome)    COPD exacerbation (HCC)    Hyperlipidemia    Malnutrition (HCC)    Palliative care by specialist    Metabolic encephalopathy    Severe protein-calorie malnutrition (HCC)        04/01/25 0947   OT Last Visit   OT Visit Date 04/01/25   Note Type   Note Type Treatment   Pain Assessment   Pain Assessment Tool 0-10   Pain Score No Pain   Restrictions/Precautions   Weight Bearing Precautions Per Order No   Other Precautions Cognitive;Chair Alarm;Bed Alarm;Telemetry;O2;Fall Risk   Lifestyle   Autonomy I w/ ADLS, assist IADLS, Mod I w/ transfers and functional mobility PTA   Reciprocal Relationships Pt lives w/ his spouse and son   Service to Others retired; demolition   Intrinsic Gratification building model cars   ADL   Where Assessed Edge of bed  (and standing)   Grooming Assistance 4  Minimal Assistance   UB Bathing Assistance 3  Moderate Assistance   LB Bathing Assistance 2  Maximal Assistance   UB Dressing Assistance 3  Moderate Assistance   UB Dressing Deficit Increased time to complete;Thread LUE;Thread RUE;Pull around back   UB Dressing Comments Pt requires MOD A to don robe seated EOB   LB Dressing Assistance 2  Maximal Assistance   Toileting Assistance  2  Maximal Assistance   Toileting Deficit Increased time to complete;Perineal hygiene   Toileting Comments Pt requires MAX A to complete humaira hygiene after BM   Bed Mobility   Supine to Sit 5  Supervision   Additional items Increased time required;Verbal cues   Transfers   Sit to Stand 4  Minimal assistance   Additional items Assist x 1;Increased time required;Verbal  cues   Stand to Sit 4  Minimal assistance   Additional items Assist x 1;Increased time required;Verbal cues   Additional Comments with rw   Functional Mobility   Functional Mobility 4  Minimal assistance   Additional Comments PT requires MIN A, rw, and seated rest break to ambulate short household distances   Additional items Rolling walker   Cognition   Overall Cognitive Status Impaired   Arousal/Participation Alert;Responsive   Attention Attends with cues to redirect   Orientation Level Oriented X4;Oriented to person;Oriented to time;Oriented to place;Oriented to situation   Memory Decreased recall of precautions;Decreased recall of recent events;Decreased short term memory   Following Commands Follows one step commands with increased time or repetition   Comments Pt agreeable to therapy, pt requires cues for safety and redirection to task   Activity Tolerance   Activity Tolerance Patient limited by fatigue   Medical Staff Made Aware PT, RN Cleared   Assessment   Assessment Pt was seen on 4/1/2025 to address ADL retraining, functional transfer training, and activity tolerance/endurance. Pt with active OT orders and activity orders. Pt demonstrating improvements and currently requires MIN A to complete functional transfers and ambulate short household distances with rw and seated break. Pt requires MOD A to don gown and complete humaira hygiene. Pt is limited by decreased ADL status, functional transfers, functional mobility, and activity tolerance. Pt supine in bed at beginning of session and seated in bedside chair at end of session with alarm set and all items within reach. The patient's raw score on the AM-PAC Daily Activity Inpatient Short Form is 14. A raw score of less than 19 suggests the patient may benefit from discharge to post-acute rehabilitation services. Please refer to the recommendation of the Occupational Therapist for safe discharge planning. Recommend Level II moderate intensity OT services at d/c  to maximize pt function.   Plan   Treatment Interventions ADL retraining;Functional transfer training;UE strengthening/ROM;Endurance training;Patient/family training;Cognitive reorientation;Equipment evaluation/education;Neuromuscular reeducation;Compensatory technique education;Continued evaluation;Energy conservation;Activityengagement   Goal Expiration Date 04/15/25   OT Treatment Day 3   OT Frequency 2-3x/wk   Discharge Recommendation   Rehab Resource Intensity Level, OT II (Moderate Resource Intensity)   AM-PAC Daily Activity Inpatient   Lower Body Dressing 2   Bathing 2   Toileting 2   Upper Body Dressing 2   Grooming 3   Eating 3   Daily Activity Raw Score 14   Daily Activity Standardized Score (Calc for Raw Score >=11) 33.39   AM-PAC Applied Cognition Inpatient   Following a Speech/Presentation 3   Understanding Ordinary Conversation 4   Taking Medications 3   Remembering Where Things Are Placed or Put Away 3   Remembering List of 4-5 Errands 2   Taking Care of Complicated Tasks 2   Applied Cognition Raw Score 17   Applied Cognition Standardized Score 36.52   End of Consult   Education Provided Yes;Family or social support of family present for education by provider   Patient Position at End of Consult Bedside chair;Bed/Chair alarm activated;All needs within reach   Nurse Communication Nurse aware of consult     NOLAN Gusman, OTR/L

## 2025-04-01 NOTE — ASSESSMENT & PLAN NOTE
Malnutrition Findings:     Body mass index is 17.41 kg/m².   Encourage adequate protein and calorie intake

## 2025-04-02 LAB — 1,25(OH)2D SERPL-MCNC: 12.3 PG/ML (ref 5–200)

## 2025-04-02 PROCEDURE — 94640 AIRWAY INHALATION TREATMENT: CPT

## 2025-04-02 PROCEDURE — 94760 N-INVAS EAR/PLS OXIMETRY 1: CPT

## 2025-04-02 PROCEDURE — 99232 SBSQ HOSP IP/OBS MODERATE 35: CPT | Performed by: INTERNAL MEDICINE

## 2025-04-02 PROCEDURE — 99255 IP/OBS CONSLTJ NEW/EST HI 80: CPT

## 2025-04-02 PROCEDURE — 94664 DEMO&/EVAL PT USE INHALER: CPT

## 2025-04-02 PROCEDURE — 94660 CPAP INITIATION&MGMT: CPT

## 2025-04-02 RX ORDER — PREDNISONE 5 MG/1
5 TABLET ORAL DAILY
Status: DISCONTINUED | OUTPATIENT
Start: 2025-04-03 | End: 2025-04-03

## 2025-04-02 RX ADMIN — TORSEMIDE 40 MG: 20 TABLET ORAL at 08:08

## 2025-04-02 RX ADMIN — LEVALBUTEROL HYDROCHLORIDE 1.25 MG: 1.25 SOLUTION RESPIRATORY (INHALATION) at 14:56

## 2025-04-02 RX ADMIN — PREDNISONE 10 MG: 10 TABLET ORAL at 08:08

## 2025-04-02 RX ADMIN — LEVALBUTEROL HYDROCHLORIDE 1.25 MG: 1.25 SOLUTION RESPIRATORY (INHALATION) at 02:50

## 2025-04-02 RX ADMIN — FORMOTEROL FUMARATE DIHYDRATE 20 MCG: 20 SOLUTION RESPIRATORY (INHALATION) at 20:14

## 2025-04-02 RX ADMIN — CLOTRIMAZOLE: 1 CREAM TOPICAL at 08:09

## 2025-04-02 RX ADMIN — IPRATROPIUM BROMIDE 0.5 MG: 0.5 SOLUTION RESPIRATORY (INHALATION) at 20:14

## 2025-04-02 RX ADMIN — LEVALBUTEROL HYDROCHLORIDE 1.25 MG: 1.25 SOLUTION RESPIRATORY (INHALATION) at 20:14

## 2025-04-02 RX ADMIN — IPRATROPIUM BROMIDE 0.5 MG: 0.5 SOLUTION RESPIRATORY (INHALATION) at 08:03

## 2025-04-02 RX ADMIN — FLUTICASONE PROPIONATE 2 SPRAY: 50 SPRAY, METERED NASAL at 08:09

## 2025-04-02 RX ADMIN — DOCUSATE SODIUM 100 MG: 100 CAPSULE, LIQUID FILLED ORAL at 08:08

## 2025-04-02 RX ADMIN — IPRATROPIUM BROMIDE 0.5 MG: 0.5 SOLUTION RESPIRATORY (INHALATION) at 14:56

## 2025-04-02 RX ADMIN — HEPARIN SODIUM 5000 UNITS: 5000 INJECTION INTRAVENOUS; SUBCUTANEOUS at 21:47

## 2025-04-02 RX ADMIN — HEPARIN SODIUM 5000 UNITS: 5000 INJECTION INTRAVENOUS; SUBCUTANEOUS at 14:18

## 2025-04-02 RX ADMIN — CLOTRIMAZOLE: 1 CREAM TOPICAL at 17:16

## 2025-04-02 RX ADMIN — FORMOTEROL FUMARATE DIHYDRATE 20 MCG: 20 SOLUTION RESPIRATORY (INHALATION) at 08:06

## 2025-04-02 RX ADMIN — LOSARTAN POTASSIUM 12.5 MG: 25 TABLET, FILM COATED ORAL at 14:18

## 2025-04-02 RX ADMIN — ASPIRIN 81 MG CHEWABLE TABLET 81 MG: 81 TABLET CHEWABLE at 08:08

## 2025-04-02 RX ADMIN — Medication 1 TABLET: at 08:08

## 2025-04-02 RX ADMIN — DOCUSATE SODIUM 100 MG: 100 CAPSULE, LIQUID FILLED ORAL at 17:16

## 2025-04-02 RX ADMIN — HEPARIN SODIUM 5000 UNITS: 5000 INJECTION INTRAVENOUS; SUBCUTANEOUS at 05:23

## 2025-04-02 RX ADMIN — LEVALBUTEROL HYDROCHLORIDE 1.25 MG: 1.25 SOLUTION RESPIRATORY (INHALATION) at 08:03

## 2025-04-02 RX ADMIN — BUDESONIDE 0.5 MG: 0.5 INHALANT RESPIRATORY (INHALATION) at 20:14

## 2025-04-02 RX ADMIN — BUDESONIDE 0.5 MG: 0.5 INHALANT RESPIRATORY (INHALATION) at 08:03

## 2025-04-02 RX ADMIN — POLYETHYLENE GLYCOL 3350 17 G: 17 POWDER, FOR SOLUTION ORAL at 08:08

## 2025-04-02 RX ADMIN — IPRATROPIUM BROMIDE 0.5 MG: 0.5 SOLUTION RESPIRATORY (INHALATION) at 02:50

## 2025-04-02 NOTE — PROGRESS NOTES
Progress Note - Pulmonology   Name: Natalio Hartmann Jr. 69 y.o. male I MRN: [id number]  [facility] Date of Admission: 3/14/2025   Date of Service: 4/2/2025 I Hospital Day: 19     Assessment & Plan  Acute on chronic respiratory failure with hypoxia and hypercapnia (HCC)  Acute on Chronic Hypoxic Hypercapnic respiratory failure 2/2 COPD/CHF exacerbation S/P MICU admission for IV diuresis, intermittent steroid dosing. Transferred to medical floor.     Not a candidate for at-home My-Airvo 3 due to nocturnal Trilogy usage  BIPAP 15/6 at night and as needed.  C/W Fan therapy  Patient awaiting insurance approval for [facility] to which he has been accepted.  COPD exacerbation (HCC)  Fully resolved at this point  Baseline FEV1 22%  Baseline O2 requirement 4L with nocutrnal Trilogy  Continue nebulized bronchodilators budesonide, formoterol  Pulmonary toileting  Continue with prednisone 10 mg daily (baseline outpt steroid dosing)  Obstructive sleep apnea  On Trilogy NIV at night at home  C/W BIPAP as above  Goals of care, counseling/discussion  Family meeting 3/28, patient to continue current management and is not willing to go to acute rehab.   Palliative care by specialist  Palliative team following    24 Hour Events : No acute events overnight  Subjective : Seen and examined at bedside.  States that he worked with physical therapy yesterday and tolerated it well.  Feels hungry    Objective :  Temp:  [96.9 °F (36.1 °C)-98.6 °F (37 °C)] 97.9 °F (36.6 °C)  HR:  [61-76] 61  BP: ()/(56-64) 103/62  Resp:  [18] 18  SpO2:  [96 %-100 %] 100 %  O2 Device: BiPAP    Physical Exam  Vitals reviewed.   Constitutional:       Comments: Markedly cachectic   HENT:      Head: Normocephalic and atraumatic.   Cardiovascular:      Rate and Rhythm: Normal rate and regular rhythm.   Pulmonary:      Effort: Pulmonary effort is normal. No respiratory distress.      Breath sounds: Wheezing present.      Comments: Diminished breath sounds  throughout  Musculoskeletal:      Right lower leg: No edema.      Left lower leg: No edema.   Neurological:      Mental Status: He is alert.         Lab Results: I have reviewed the following results:   No new results in last 24 hours.    ABG: No new results in last 24 hours.    Imaging Results Review: No pertinent imaging studies reviewed.  Other Study Results Review: No additional pertinent studies reviewed.  PFT Results Reviewed: reviewed

## 2025-04-02 NOTE — ASSESSMENT & PLAN NOTE
Felt to be due to respiratory failure in the setting of COPD and CHF with hypoxia and hypercapnia

## 2025-04-02 NOTE — OCCUPATIONAL THERAPY NOTE
Occupational Therapy Cancel        Patient Name: Natalio Hartmann Jr.  Today's Date: 4/2/2025 04/02/25 1049   OT Last Visit   OT Visit Date 04/02/25   Note Type   Note type Cancelled Session   Additional Comments Attempted to see pt for OT treatmet this AM. Pt reporting fatigue and requesitng OT return later today. Upon 2nd attempt RN reporting that pt is on BiPAP and not appropriate at this time.     NOLAN Gusman, OTR/L

## 2025-04-02 NOTE — ASSESSMENT & PLAN NOTE
Wt Readings from Last 3 Encounters:   04/02/25 44.3 kg (97 lb 10.6 oz)   02/21/25 44.3 kg (97 lb 11.2 oz)   09/06/24 44.9 kg (99 lb)   EF 25%  Transitioned to PO torsemide 40 mg daily

## 2025-04-02 NOTE — CASE MANAGEMENT
Case Management Discharge Planning Note    Patient name Natalio Hartmann Jr.  [facility] [id number]  : 1955 Date 2025       Current Admission Date: 3/14/2025  Current Admission Diagnosis:Acute on chronic respiratory failure with hypoxia and hypercapnia (HCC)   Patient Active Problem List    Diagnosis Date Noted Date Diagnosed    Iron deficiency anemia secondary to inadequate dietary iron intake 2024     SIADH (syndrome of inappropriate ADH production) ([facility]) 2024     Type 2 diabetes mellitus without complication, with long-term current use of insulin ([facility]) 2024     End stage COPD ([facility]) 2024     Dependence on respirator (ventilator) status ([facility]) 01/10/2024     Severe protein-calorie malnutrition ([facility]) 10/14/2023     History of bladder cancer 2023     Pulmonary cachexia due to COPD ([facility])      Chronic hypercapnia/KEVIN 2023     Metabolic encephalopathy 2023     Palliative care by specialist 2023     Alkalosis 2023     Malnutrition ([facility]) 06/10/2023     Hyperlipidemia 2023     COPD exacerbation ([facility]) 2023     Steroid-induced hyperglycemia 2023     Physical deconditioning 2023     Chronic anemia 2023     SIRS (systemic inflammatory response syndrome) 2023     Hyponatremia 2023     Acute on chronic systolic congestive heart failure ([facility]) 2022     Pulmonary nodules/lesions, multiple 2022     Prostate cancer ([facility]) 2021     BPH with obstruction/lower urinary tract symptoms 2021     Goals of care, counseling/discussion 2021     Acute on chronic respiratory failure with hypoxia and hypercapnia ([facility]) 2020     Macrocytosis without anemia 2019     Other insomnia 2019     GERD (gastroesophageal reflux disease) 2017     Nonobstructive atherosclerosis of coronary artery 2016     Mood disorder ([facility]) 2015     Prediabetes 2015      Osteoporosis 10/14/2014     Vitamin D deficiency 10/14/2014     Nonischemic cardiomyopathy (HCC) 09/26/2014     Left bundle-branch block 08/03/2013     Osteoarthritis 08/03/2013     Obstructive sleep apnea 07/29/2013       LOS (days): 19  Geometric Mean LOS (GMLOS) (days): 3.9  Days to GMLOS:-15     OBJECTIVE:  Risk of Unplanned Readmission Score: 24.02         Current admission status: Inpatient   Preferred Pharmacy:   [id number] - [place], [place] - [address]  [place] [address]  Phone: [phone] Fax: [phone]    Primary Care Provider: Fatmata Gustafson DO    Primary Insurance: BLUE CROSS  Secondary Insurance: MEDICARE    DISCHARGE DETAILS:    Patient and patient's spouse chose [facility]. CM reserved [facility] in [place] and submitted for auth.

## 2025-04-02 NOTE — ASSESSMENT & PLAN NOTE
Hx of end stage COPD and CHF on baseline 4 liters oxygen via nasal cannula   Trilogy NIV HS and daily prednisone 10 mg daily   Admitted to ICU for IV diuresis, BiPAP, steroids and nebulizers   Intermittent BiPAP versus high flow nasal cannula   The patient currently does not want to go to rehabilitation and would prefer to go home. Would recommend acute inpatient rehabilitation but would have to be willing and able to participate in 3 hours of therapy. If patient is not willing would recommend sub-acute inpatient rehabilitation.

## 2025-04-02 NOTE — DISCHARGE SUPPORT
Case Management Assessment & Discharge Planning Note    Patient name Natalio Hartmann Jr.  [facility] [id number]  : 1955 Date 2025       Current Admission Date: 3/14/2025  Current Admission Diagnosis:Acute on chronic respiratory failure with hypoxia and hypercapnia ([facility])   Patient Active Problem List    Diagnosis Date Noted Date Diagnosed    Iron deficiency anemia secondary to inadequate dietary iron intake 2024     SIADH (syndrome of inappropriate ADH production) ([facility]) 2024     Type 2 diabetes mellitus without complication, with long-term current use of insulin ([facility]) 2024     End stage COPD ([facility]) 2024     Dependence on respirator (ventilator) status ([facility]) 01/10/2024     Severe protein-calorie malnutrition ([facility]) 10/14/2023     History of bladder cancer 2023     Pulmonary cachexia due to COPD ([facility])      Chronic hypercapnia/KEVIN 2023     Metabolic encephalopathy 2023     Palliative care by specialist 2023     Alkalosis 2023     Malnutrition ([facility]) 06/10/2023     Hyperlipidemia 2023     COPD exacerbation ([facility]) 2023     Steroid-induced hyperglycemia 2023     Physical deconditioning 2023     Chronic anemia 2023     SIRS (systemic inflammatory response syndrome) 2023     Hyponatremia 2023     Acute on chronic systolic congestive heart failure ([facility]) 2022     Pulmonary nodules/lesions, multiple 2022     Prostate cancer ([facility]) 2021     BPH with obstruction/lower urinary tract symptoms 2021     Goals of care, counseling/discussion 2021     Acute on chronic respiratory failure with hypoxia and hypercapnia ([facility]) 2020     Macrocytosis without anemia 2019     Other insomnia 2019     GERD (gastroesophageal reflux disease) 2017     Nonobstructive atherosclerosis of coronary artery 2016     Mood disorder ([facility]) 2015     Prediabetes 2015      Osteoporosis 10/14/2014     Vitamin D deficiency 10/14/2014     Nonischemic cardiomyopathy (HCC) 09/26/2014     Left bundle-branch block 08/03/2013     Osteoarthritis 08/03/2013     Obstructive sleep apnea 07/29/2013       LOS (days): 19  Geometric Mean LOS (GMLOS) (days): 3.9  Days to GMLOS:-14.9   Facility Authorization Initiated  [facility] received request for auth from : Lisa Rogers  Authorization Request Submitted for: Acute Rehab  Requested Start of Care Date: 04/02/25  Facility Name: [facility] NPI: [id number]  Facility MD: Ashley DePadua  [facility] MD NPI: [id number]  Authorization initiated by contacting insurance: [facility]  Via: [email or web address]  Clinicals submitted via: Portal Attachment  Pending reference #: [id number]   notified: Lisa Rogers     Updates to authorization status will be noted in chart.    Please reach out to CM for updates on any clinical information.

## 2025-04-02 NOTE — ASSESSMENT & PLAN NOTE
Malnutrition Findings:     Body mass index is 17.86 kg/m².   Encourage adequate protein and calorie intake

## 2025-04-02 NOTE — DISCHARGE SUPPORT
Received call from Aleta @ [facility] regarding pended IRF auth (P#: [phone]). Stated a preadmission screening assessment signed off by [facility] is required showing patient is appropriate for IRF level and provides verification patient can tolerate 15hrs of services weekly. Stated note(s) can be sent to F#: [phone] and deadline is 04/03 by 12pm. CM notified.     04/02 @ 340pm - PMR consult faxed to insurance. CM notified.

## 2025-04-02 NOTE — NUTRITION
Nutrition Follow-Up:       04/02/25 1401   Recommendations/Interventions   Interventions/Recommendations Supplement continue;Adjust diet order   Intervention Comments Current supplement orders are Ensure Plus High Protein and Gelatein each once daily and Magic Cup BID. RD can adjust these at any point per pt request; RD available via [facility] Dietitian P4 role in secure chat.   Recommendations to Provider Consider partial diet liberalization from 2g sodium to 4g sodium (no added salt) if medically able to expand menu options. RD will continue to follow while inpatient; pt's meal intake has been consistently documented at %.

## 2025-04-02 NOTE — ASSESSMENT & PLAN NOTE
Wt Readings from Last 3 Encounters:   04/02/25 44.3 kg (97 lb 10.6 oz)   02/21/25 44.3 kg (97 lb 11.2 oz)   09/06/24 44.9 kg (99 lb)   Known history of nonischemic cardiomyopathy with LVEF 25%  Presents with worsening shortness of breath volume overload state noted  transitioned to PO diuretics on 3/25- torsemide 40 mg daily  I's/O, daily weight

## 2025-04-02 NOTE — PROGRESS NOTES
Progress Note - Hospitalist   Name: Natalio Harmtann Jr. 69 y.o. male I MRN: [id number]  Unit/Bed#: [facility] 431-01 I Date of Admission: 3/14/2025   Date of Service: 4/2/2025 I Hospital Day: 19    Assessment & Plan  Acute on chronic respiratory failure with hypoxia and hypercapnia (HCC)  History of COPD and CHF, baseline O2 needs of 4 L via nasal cannula  Uses trilogy NIV at night and is on prednisone 10 mg daily chronically  Admitted to ICU for IV diuresis and respiratory support with BiPAP, steroids, nebulizers,  improved and tansferred to floor on 3/22  Most likely due to severe COPD  Pulmonology following  Pulmonary following, continues to need intermittent BiPAP versus high flow nasal cannula due to dyspnea   Continue to encourage to work with PT  Patient and his wife are now open to [facility] or [facility]; referral requested by case management, still full code, and not open to hospice  Acute on chronic systolic congestive heart failure (HCC)  Wt Readings from Last 3 Encounters:   04/02/25 44.3 kg (97 lb 10.6 oz)   02/21/25 44.3 kg (97 lb 11.2 oz)   09/06/24 44.9 kg (99 lb)   Known history of nonischemic cardiomyopathy with LVEF 25%  Presents with worsening shortness of breath volume overload state noted  transitioned to PO diuretics on 3/25- torsemide 40 mg daily  I's/O, daily weight  COPD exacerbation (HCC)  Monitored by pulmonology during hospitalization.  Low concern for acute exacerbation  Continue inhaler regimen and prednisone 10 mg daily  SIRS (systemic inflammatory response syndrome)  SIRS present on admission, patient initially covered for suspected pneumonia. Symptoms felt more likely to be related to CHF, and abx stopped after 4 days  Blood cultures negative  Monitor off antibiotics  Resolved  Obstructive sleep apnea  BiPAP as needed and at bedtime, patient appears to be tolerating  Metabolic encephalopathy  resolved  Secondary to respiratory failure which is multifactorial related to COPD and CHF.  Probable  hypoxia and hypercapnia component as well  Status appears to be improving with nocturnal BIPAP  Continue supportive measures and monitor  Severe protein-calorie malnutrition (HCC)  Malnutrition Findings:     Body mass index is 17.86 kg/m².   Encourage adequate protein and calorie intake  Goals of care, counseling/discussion  Patient with multiple comorbidities and overall guarded prognosis.  Still continues Level 1 Full Code at this time as per family wishes  Palliative care team following, involvement appreciated  Hyperlipidemia  Resume Pravachol 80mg po qd  Malnutrition (HCC)  Malnutrition Findings:   Adult Malnutrition type: Chronic illness  Adult Degree of Malnutrition: Other severe protein calorie malnutrition  Malnutrition Characteristics: Fat loss, Muscle loss              360 Statement: severe malnutrition r/t chronic illness as evidenced by severe muscle loss around clavicles and shoulders, moderate-severe muscle loss in temples, severe buccal fat pad loss, apparent depression between ribs suggesting severe fat loss. Treatment: oral diet and oral nutrition supplements  BMI Findings:         Body mass index is 17.86 kg/m².   Palliative care by specialist  Appreciate palliative team input    VTE Pharmacologic Prophylaxis: VTE Score: 8  ordered    Mobility:   Basic Mobility Inpatient Raw Score: 16  JH-HLM Goal: 5: Stand one or more mins  JH-HLM Achieved: 4: Move to chair/commode  JH-HLM Goal NOT achieved. Continue with multidisciplinary rounding and encourage appropriate mobility to improve upon JH-HLM goals.    Patient Centered Rounds: I performed bedside rounds with nursing staff today.   Discussions with Specialists or Other Care Team Provider: PMR    Education and Discussions with Family / Patient: Updated  (wife) at bedside.    Current Length of Stay: 19 day(s)  Current Patient Status: Inpatient   Certification Statement: The patient will continue to require additional inpatient hospital  stay due to placement  Discharge Plan: Medically cleared.    Code Status: Level 1 - Full Code    Subjective   No acute events.    Objective :  Temp:  [97.9 °F (36.6 °C)-98.6 °F (37 °C)] 98 °F (36.7 °C)  HR:  [61-76] 76  BP: ()/(56-71) 115/71  Resp:  [17-18] 17  SpO2:  [97 %-100 %] 98 %  O2 Device: Nasal cannula  Nasal Cannula O2 Flow Rate (L/min):  [4 L/min] 4 L/min    Body mass index is 17.86 kg/m².     Input and Output Summary (last 24 hours):     Intake/Output Summary (Last 24 hours) at 4/2/2025 1634  Last data filed at 4/2/2025 0735  Gross per 24 hour   Intake 480 ml   Output 300 ml   Net 180 ml       Physical Exam  Cardiovascular:      Rate and Rhythm: Normal rate and regular rhythm.   Pulmonary:      Effort: No respiratory distress.      Comments: Diminished breath sounds on the bases bilaterally  Abdominal:      General: Bowel sounds are normal.      Palpations: Abdomen is soft.   Neurological:      Mental Status: He is alert.           Lines/Drains:  Lines/Drains/Airways       Active Status       Name Placement date Placement time Site Days    External Urinary Catheter 03/21/25  1300  -- 12                            Lab Results: I have reviewed the following results:   Results from last 7 days   Lab Units 04/01/25  0547   WBC Thousand/uL 5.83   HEMOGLOBIN g/dL 10.5*   HEMATOCRIT % 34.7*   PLATELETS Thousands/uL 173   SEGS PCT % 75   LYMPHO PCT % 11*   MONO PCT % 12   EOS PCT % 1     Results from last 7 days   Lab Units 04/01/25  0547   SODIUM mmol/L 136   POTASSIUM mmol/L 4.4   CHLORIDE mmol/L 88*   CO2 mmol/L 43*   BUN mg/dL 37*   CREATININE mg/dL 0.96   ANION GAP mmol/L 5   CALCIUM mg/dL 9.0   GLUCOSE RANDOM mg/dL 91         Results from last 7 days   Lab Units 03/26/25  1703   POC GLUCOSE mg/dl 217*               Recent Cultures (last 7 days):         Imaging Results Review: I reviewed radiology reports from this admission including: chest xray.  Other Study Results Review: No additional pertinent  studies reviewed.    Last 24 Hours Medication List:     Current Facility-Administered Medications:     acetaminophen (TYLENOL) tablet 650 mg, Q6H PRN    albumin human (FLEXBUMIN) 5 % injection 25 g, Once PRN    albuterol inhalation solution 2.5 mg, Q4H PRN    aluminum-magnesium hydroxide-simethicone (MAALOX) oral suspension 30 mL, Q6H PRN    aspirin chewable tablet 81 mg, Daily    budesonide (PULMICORT) inhalation solution 0.5 mg, Q12H    calcium carbonate-vitamin D 500 mg-5 mcg tablet 1 tablet, Daily With Breakfast    clotrimazole (LOTRIMIN) 1 % cream, BID    docusate sodium (COLACE) capsule 100 mg, BID    fluticasone (FLONASE) 50 mcg/act nasal spray 2 spray, Daily    formoterol (PERFOROMIST) nebulizer solution 20 mcg, Q12H    heparin (porcine) subcutaneous injection 5,000 Units, Q8H GABE    ipratropium (ATROVENT) 0.02 % inhalation solution 0.5 mg, Q6H    levalbuterol (XOPENEX) inhalation solution 1.25 mg, Q6H    losartan (COZAAR) tablet 12.5 mg, Daily    ondansetron (ZOFRAN) injection 4 mg, Q6H PRN    polyethylene glycol (MIRALAX) packet 17 g, Daily    [START ON 4/3/2025] predniSONE tablet 5 mg, Daily    torsemide (DEMADEX) tablet 40 mg, Daily    Administrative Statements   Today, Patient Was Seen By: Remi Rodriguez MD  I have spent a total time of 35 minutes in caring for this patient on the day of the visit/encounter including Diagnostic results, Reviewing/placing orders in the medical record (including tests, medications, and/or procedures), Obtaining or reviewing history  , and Communicating with other healthcare professionals .    **Please Note: This note may have been constructed using a voice recognition system.**

## 2025-04-02 NOTE — ASSESSMENT & PLAN NOTE
Acute on Chronic Hypoxic Hypercapnic respiratory failure 2/2 COPD/CHF exacerbation S/P MICU admission for IV diuresis, intermittent steroid dosing. Transferred to medical floor.     Not a candidate for at-home My-Airvo 3 due to nocturnal Trilogy usage  BIPAP 15/6 at night and as needed.  C/W Fan therapy  Patient awaiting insurance approval for [facility] to which he has been accepted.

## 2025-04-02 NOTE — CASE MANAGEMENT
Case Management Discharge Planning Note    Patient name Natalio Hartmann Jr.  [facility] [id number]  : 1955 Date 2025       Current Admission Date: 3/14/2025  Current Admission Diagnosis:Acute on chronic respiratory failure with hypoxia and hypercapnia (HCC)   Patient Active Problem List    Diagnosis Date Noted Date Diagnosed    Iron deficiency anemia secondary to inadequate dietary iron intake 2024     SIADH (syndrome of inappropriate ADH production) ([facility]) 2024     Type 2 diabetes mellitus without complication, with long-term current use of insulin ([facility]) 2024     End stage COPD ([facility]) 2024     Dependence on respirator (ventilator) status ([facility]) 01/10/2024     Severe protein-calorie malnutrition ([facility]) 10/14/2023     History of bladder cancer 2023     Pulmonary cachexia due to COPD ([facility])      Chronic hypercapnia/KEVIN 2023     Metabolic encephalopathy 2023     Palliative care by specialist 2023     Alkalosis 2023     Malnutrition ([facility]) 06/10/2023     Hyperlipidemia 2023     COPD exacerbation ([facility]) 2023     Steroid-induced hyperglycemia 2023     Physical deconditioning 2023     Chronic anemia 2023     SIRS (systemic inflammatory response syndrome) 2023     Hyponatremia 2023     Acute on chronic systolic congestive heart failure ([facility]) 2022     Pulmonary nodules/lesions, multiple 2022     Prostate cancer ([facility]) 2021     BPH with obstruction/lower urinary tract symptoms 2021     Goals of care, counseling/discussion 2021     Acute on chronic respiratory failure with hypoxia and hypercapnia ([facility]) 2020     Macrocytosis without anemia 2019     Other insomnia 2019     GERD (gastroesophageal reflux disease) 2017     Nonobstructive atherosclerosis of coronary artery 2016     Mood disorder ([facility]) 2015     Prediabetes 2015      Osteoporosis 10/14/2014     Vitamin D deficiency 10/14/2014     Nonischemic cardiomyopathy (HCC) 09/26/2014     Left bundle-branch block 08/03/2013     Osteoarthritis 08/03/2013     Obstructive sleep apnea 07/29/2013       LOS (days): 19  Geometric Mean LOS (GMLOS) (days): 3.9  Days to GMLOS:-15.2     OBJECTIVE:  Risk of Unplanned Readmission Score: 24.08         Current admission status: Inpatient   Preferred Pharmacy:   [id number] - [place], [place] - [address]  [place] [address]  Phone: [phone] Fax: [phone]    Primary Care Provider: Fatmata Gustafson DO    Primary Insurance: BLUE CROSS  Secondary Insurance: MEDICARE    DISCHARGE DETAILS:    CM informed by discharge support that insurance was requesting PMR consult. CM requested PMR consult.  PMR consult was completed and discharge support faxed to insurance company.      CM met with patient at bedside and explained that auth was still pending. Patient reports that he wants to go to rehab. CM informed patient that CM would keep him updated.

## 2025-04-02 NOTE — ASSESSMENT & PLAN NOTE
Malnutrition Findings:   Adult Malnutrition type: Chronic illness  Adult Degree of Malnutrition: Other severe protein calorie malnutrition  Malnutrition Characteristics: Fat loss, Muscle loss              360 Statement: severe malnutrition r/t chronic illness as evidenced by severe muscle loss around clavicles and shoulders, moderate-severe muscle loss in temples, severe buccal fat pad loss, apparent depression between ribs suggesting severe fat loss. Treatment: oral diet and oral nutrition supplements  BMI Findings:         Body mass index is 17.86 kg/m².

## 2025-04-02 NOTE — CONSULTS
PHYSICAL MEDICINE AND REHABILITATION CONSULT NOTE  Natalio Hartmann Jr. 69 y.o. male MRN: [id number]  Unit/Bed#: [facility] 431-01 Encounter: [id number]    Requested by (Physician/Service): Remi Rodriguez MD  Reason for Consultation:  Assessment of rehabilitation needs    Assessment & Plan  Acute on chronic respiratory failure with hypoxia and hypercapnia (HCC)  Hx of end stage COPD and CHF on baseline 4 liters oxygen via nasal cannula   Trilogy NIV HS and daily prednisone 10 mg daily   Admitted to ICU for IV diuresis, BiPAP, steroids and nebulizers   Intermittent BiPAP versus high flow nasal cannula   The patient currently does not want to go to rehabilitation and would prefer to go home. Would recommend acute inpatient rehabilitation but would have to be willing and able to participate in 3 hours of therapy. If patient is not willing would recommend sub-acute inpatient rehabilitation.   Obstructive sleep apnea    Goals of care, counseling/discussion  Palliative care following, currently not open to hospice.   Level 1 code and treatment focused  Acute on chronic systolic congestive heart failure (HCC)  Wt Readings from Last 3 Encounters:   04/02/25 44.3 kg (97 lb 10.6 oz)   02/21/25 44.3 kg (97 lb 11.2 oz)   09/06/24 44.9 kg (99 lb)   EF 25%  Transitioned to PO torsemide 40 mg daily  SIRS (systemic inflammatory response syndrome)    COPD exacerbation (HCC)  Inhaler and daily prednision   Hyperlipidemia    Malnutrition (HCC)  Malnutrition Findings:   Adult Malnutrition type: Chronic illness  Adult Degree of Malnutrition: Other severe protein calorie malnutrition  Malnutrition Characteristics: Fat loss, Muscle loss                  360 Statement: severe malnutrition r/t chronic illness as evidenced by severe muscle loss around clavicles and shoulders, moderate-severe muscle loss in temples, severe buccal fat pad loss, apparent depression between ribs suggesting severe fat loss. Treatment: oral diet and oral nutrition  supplements    BMI Findings:           Body mass index is 17.86 kg/m².     Metabolic encephalopathy  Felt to be due to respiratory failure in the setting of COPD and CHF with hypoxia and hypercapnia   Severe protein-calorie malnutrition (HCC)  Malnutrition Findings:   Adult Malnutrition type: Chronic illness  Adult Degree of Malnutrition: Other severe protein calorie malnutrition  Malnutrition Characteristics: Fat loss, Muscle loss                  360 Statement: severe malnutrition r/t chronic illness as evidenced by severe muscle loss around clavicles and shoulders, moderate-severe muscle loss in temples, severe buccal fat pad loss, apparent depression between ribs suggesting severe fat loss. Treatment: oral diet and oral nutrition supplements    BMI Findings:           Body mass index is 17.86 kg/m².       Thank you for this consultation.  Do not hesitate to contact service with further questions.      DELIA Nolasco  PM&R    I have spent a total time of 30 minutes on 04/02/25 in caring for this patient including Patient and family education, Counseling / Coordination of care, Documenting in the medical record, Reviewing/placing orders in the medical record (including tests, medications, and/or procedures), Obtaining or reviewing history  , and Communicating with other healthcare professionals .    History of Present Illness:  Natalio Hartmann Jr. is a 69 y.o. male with a PMH of severe COPD, chronic hypoxic and hypercapnic respiratory failure on 4 liters supplemental oxygen, cachexia, non-ischemic cardiomyopathy, chronic systolic CHF who [facility] on 3/14/2025 with worsening SOB. The day prior he was increasingly lethargic feeling tired and fatigued. He was placed on BiPAP in the ER. He was felt to have COPD and CHF exacerbation. He was given IV lasix and started on IV Solu-Medrol as well as Azithromycin. He uses trilogy NIV at  and prednisone 10 mg daily  "chronically. He was admitted to ICU for IV diuresis and respiratory support with BiPAP, steroids, and nebulizers. He improved and was transferred out of ICU on 3/22/2024. He has been transitioned to torsemide 40 mg daily. PM&R are consulted for rehabilitation recommendations.     The patient was seen in his room with his spouse at bedside. He reports poor eyesight. He is SOB at rest but spouse reports that is his baseline. He states he just sits at his table and does not ambulate much as he struggles to breath. He states repeatedly he wants to go home and feels he is being kept in the hospital. He denies any injury to his back or neck. Per spouse the patient would not be agreeable to in home therapy or HH if he is d/c to home.     Review of Systems: 10 point ROS negative except for what is noted in HPI    Function:  Prior level of function and living situation: The patient lives in a ranch home with spouse and son. He has home oxygen concentrator. He was independent.       Current level of function:  Physical Therapy: Supervision for bed mobility, minimal assist to supervision for transfers, minimal assist for ambulation   Occupational Therapy: Minimal assist for grooming, moderate assist for UB bathing/dressing, maximal assist for LB bathing/dressing and toileting     Physical Exam:  /59   Pulse 68   Temp 97.9 °F (36.6 °C) (Oral)   Resp 18   Ht 5' 2\" (1.575 m)   Wt 44.3 kg (97 lb 10.6 oz)   SpO2 99%   BMI 17.86 kg/m²        Intake/Output Summary (Last 24 hours) at 4/2/2025 1348  Last data filed at 4/2/2025 0735  Gross per 24 hour   Intake 780 ml   Output 300 ml   Net 480 ml       Body mass index is 17.86 kg/m².      Physical Exam  Constitutional:       Appearance: He is cachectic. He is ill-appearing.   HENT:      Head: Atraumatic.      Comments: Temporal wasting   Eyes:      Comments: Poor eyesight    Pulmonary:      Comments: SOB at rest, barrel chest   Musculoskeletal:      Comments: 5-/5 throughout "   Muscle atrophy bilateral hands    Skin:     Findings: Bruising present.   Neurological:      Mental Status: He is alert and oriented to person, place, and time.      Comments: Hyporeflexive bilateral LE, clonus bilateral LE, decreased sensation to touch and vibration distal > proximal LE.    Psychiatric:         Behavior: Behavior is agitated.      Comments: Patient agitated and frustrated stating he wants to go home.           Social History:    Social History     Socioeconomic History    Marital status: /Civil Union     Spouse name: None    Number of children: None    Years of education: None    Highest education level: None   Occupational History    None   Tobacco Use    Smoking status: Former     Current packs/day: 0.00     Average packs/day: 2.5 packs/day for 42.0 years (105.0 ttl pk-yrs)     Types: Cigarettes     Start date:      Quit date:      Years since quittin.2    Smokeless tobacco: Former    Tobacco comments:     1 ppd for 37 years, 2010 down to 5 cigs a day, is around second hand smoke   Vaping Use    Vaping status: Never Used   Substance and Sexual Activity    Alcohol use: Not Currently     Comment: rarely    Drug use: No    Sexual activity: Not Currently     Partners: Female   Other Topics Concern    None   Social History Narrative    Daily coffee consumption: 8 or more cups a day        Used to work in [employer].  On disability.     Social Drivers of Health     Financial Resource Strain: Not on file   Food Insecurity: No Food Insecurity (3/27/2025)    Nursing - Inadequate Food Risk Classification     Worried About Running Out of Food in the Last Year: Not on file     Ran Out of Food in the Last Year: Not on file     Ran Out of Food in the Last Year: Never true   Transportation Needs: No Transportation Needs (3/27/2025)    Nursing - Transportation Risk Classification     Lack of Transportation: Not on file     Lack of Transportation: No   Physical Activity: Not on file    Stress: Not on file   Social Connections: Not on file   Intimate Partner Violence: Unknown (3/27/2025)    Nursing IPS     Feels Physically and Emotionally Safe: Not on file     Physically Hurt by Someone: Not on file     Humiliated or Emotionally Abused by Someone: Not on file     Physically Hurt by Someone: No     Hurt or Threatened by Someone: No   Housing Stability: Unknown (3/27/2025)    Nursing: Inadequate Housing Risk Classification     Has Housing: Not on file     Worried About Losing Housing: Not on file     Unable to Get Utilities: Not on file     Unable to Pay for Housing in the Last Year: No     Has Housin        Family History:    Family History   Problem Relation Age of Onset    Diabetes Mother          Medications:     Current Facility-Administered Medications:     acetaminophen (TYLENOL) tablet 650 mg, 650 mg, Oral, Q6H PRN, Richelle Martines MD    albumin human (FLEXBUMIN) 5 % injection 25 g, 25 g, Intravenous, Once PRN, Eliz Cm PA-C    albuterol inhalation solution 2.5 mg, 2.5 mg, Nebulization, Q4H PRN, Alessia Gauthier MD, 2.5 mg at 25 2240    aluminum-magnesium hydroxide-simethicone (MAALOX) oral suspension 30 mL, 30 mL, Oral, Q6H PRN, Richelle Martines MD    aspirin chewable tablet 81 mg, 81 mg, Oral, Daily, Richelle Martines MD, 81 mg at 25 0808    budesonide (PULMICORT) inhalation solution 0.5 mg, 0.5 mg, Nebulization, Q12H, Richelle Martines MD, 0.5 mg at 25 0803    calcium carbonate-vitamin D 500 mg-5 mcg tablet 1 tablet, 1 tablet, Oral, Daily With Breakfast, Jose Brown MD, 1 tablet at 25 0808    clotrimazole (LOTRIMIN) 1 % cream, , Topical, BID, Alessia Gauthier MD, Given at 25 0809    docusate sodium (COLACE) capsule 100 mg, 100 mg, Oral, BID, Richelle Martines MD, 100 mg at 25 0808    fluticasone (FLONASE) 50 mcg/act nasal spray 2 spray, 2 spray, Each Nare, Daily, Constantin Moreira MD, 2 spray at 25 0809    formoterol (PERFOROMIST) nebulizer  solution 20 mcg, 20 mcg, Nebulization, Q12H, Richelle Martines MD, 20 mcg at 04/02/25 0806    heparin (porcine) subcutaneous injection 5,000 Units, 5,000 Units, Subcutaneous, Q8H GABE, Richelle Martines MD, 5,000 Units at 04/02/25 0523    ipratropium (ATROVENT) 0.02 % inhalation solution 0.5 mg, 0.5 mg, Nebulization, Q6H, Alessia Gauthier MD, 0.5 mg at 04/02/25 0803    levalbuterol (XOPENEX) inhalation solution 1.25 mg, 1.25 mg, Nebulization, Q6H, Alessia Gauthier MD, 1.25 mg at 04/02/25 0803    losartan (COZAAR) tablet 12.5 mg, 12.5 mg, Oral, Daily, An Bautista MD, 12.5 mg at 04/01/25 1212    ondansetron (ZOFRAN) injection 4 mg, 4 mg, Intravenous, Q6H PRN, Richelle Martines MD    polyethylene glycol (MIRALAX) packet 17 g, 17 g, Oral, Daily, Richelle Martines MD, 17 g at 04/02/25 0808    [START ON 4/3/2025] predniSONE tablet 5 mg, 5 mg, Oral, Daily, Yasmin Bennett MD    torsemide (DEMADEX) tablet 40 mg, 40 mg, Oral, Daily, DELIA Dickinson, 40 mg at 04/02/25 0808    Past Medical History:     Past Medical History:   Diagnosis Date    Acute metabolic encephalopathy 03/29/2022    Arthritis     Bladder mass     Cardiomyopathy (HCC)     Chest pain     COPD (chronic obstructive pulmonary disease) (HCC)     COVID-19 virus infection 02/23/2023    CPAP (continuous positive airway pressure) dependence     Emphysema of lung (HCC)     Hypoxia     nocturnal    Left bundle branch block     Multiple pulmonary nodules     last assessed: 10/12/16    Pneumonia     Sleep apnea, obstructive     Smoker     Weight loss 08/29/2019        Past Surgical History:     Past Surgical History:   Procedure Laterality Date    COLONOSCOPY      CYSTOSCOPY      CYSTOSCOPY  02/17/2021    KY BLADDER INSTILLATION ANTICARCINOGENIC AGENT N/A 1/3/2020    Procedure: INSTILLATION MITOMYCIN;  Surgeon: Sundeep Canchola MD;  Location: [facility];  Service: Urology    KY CYSTO W/REMOVAL OF LESIONS SMALL N/A 1/3/2020    Procedure: TRANSURETHRAL RESECTION OF BLADDER  TUMOR (TURBT);  Surgeon: Sundeep Canchola MD;  Location: [facility];  Service: Urology    MS CYSTOURETHROSCOPY WITH BIOPSY N/A 4/13/2021    Procedure: CYSTOSCOPY WITH BIOPSIES;  Surgeon: Sundeep Canchola MD;  Location: [facility];  Service: Urology    MS TRURL ELECTROSURG RESCJ PROSTATE BLEED COMPLETE N/A 4/13/2021    Procedure: TRANSURETHRAL RESECTION OF PROSTATE (TURP) BLADDER BIOPSY, FULGURATION;  Surgeon: Sundeep Canchola MD;  Location: [facility];  Service: Urology         Allergies:     No Known Allergies        LABORATORY RESULTS:      Lab Results   Component Value Date    HGB 10.5 (L) 04/01/2025    HGB 14.1 08/17/2015    HCT 34.7 (L) 04/01/2025    HCT 42.8 08/17/2015    WBC 5.83 04/01/2025    WBC 6.12 08/17/2015     Lab Results   Component Value Date    BUN 37 (H) 04/01/2025    BUN 13 08/17/2015     08/17/2015    K 4.4 04/01/2025    K 3.9 08/17/2015    CL 88 (L) 04/01/2025     (H) 08/17/2015    GLUCOSE 163 (H) 10/23/2024    GLUCOSE 90 08/17/2015    CREATININE 0.96 04/01/2025    CREATININE 1.04 08/17/2015     Lab Results   Component Value Date    PROTIME 11.9 (L) 03/14/2025    INR 0.84 (L) 03/14/2025        DIAGNOSTIC STUDIES: Reviewed  XR chest portable  Result Date: 3/14/2025  Impression: Cardiomegaly and prominent vasculature, possibly related to positioning though pulmonary vascular congestion is not excluded. Clinical correlation for congestive heart failure recommended. Workstation performed: [id number]

## 2025-04-02 NOTE — ASSESSMENT & PLAN NOTE
Family meeting 3/28, patient to continue current management and is not willing to go to acute rehab.

## 2025-04-02 NOTE — CASE MANAGEMENT
Received call from Aleta @ [facility] regarding pended IRF auth (P#: [phone]). Stated a preadmission screening assessment signed off by [facility] is required showing patient is appropriate for IRF level and provides verification patient can tolerate 15hrs of services weekly. Stated note(s) can be sent to F#: [phone] and deadline is 04/03 by 12pm. CM notified.

## 2025-04-02 NOTE — QUICK NOTE
Progress Note - Palliative & Supportive Care       4/2/2025 1:02 PM -  Natalio Junggino Richardson's chart and case were reviewed by Rosa Benedict DO.  Mode of review included electronic chart check.    Per review, symptoms remain controlled on current regimen and no changes are made at this time.  Please continue the regimen in place, and review our last note for details.  For dispo plan, please review Case Management notes.       Palliative care will return on 4/3/25.      For urgent issues or any questions/concerns, please notify on-call provider via [employer] Secure Chat.  You may also call our answering service 24/7 at [phone].    Rosa Benedict DO  Palliative and Supportive Care  Clinic/Answering Service: [phone]  You can find me on [employer]!

## 2025-04-02 NOTE — PLAN OF CARE
Problem: PAIN - ADULT  Goal: Verbalizes/displays adequate comfort level or baseline comfort level  Description: Interventions:  - Encourage patient to monitor pain and request assistance  - Assess pain using appropriate pain scale  - Administer analgesics based on type and severity of pain and evaluate response  - Implement non-pharmacological measures as appropriate and evaluate response  - Consider cultural and social influences on pain and pain management  - Notify physician/advanced practitioner if interventions unsuccessful or patient reports new pain  Outcome: Progressing     Problem: INFECTION - ADULT  Goal: Absence or prevention of progression during hospitalization  Description: INTERVENTIONS:  - Assess and monitor for signs and symptoms of infection  - Monitor lab/diagnostic results  - Monitor all insertion sites, i.e. indwelling lines, tubes, and drains  - Monitor endotracheal if appropriate and nasal secretions for changes in amount and color  - Shiro appropriate cooling/warming therapies per order  - Administer medications as ordered  - Instruct and encourage patient and family to use good hand hygiene technique  - Identify and instruct in appropriate isolation precautions for identified infection/condition  Outcome: Progressing  Goal: Absence of fever/infection during neutropenic period  Description: INTERVENTIONS:  - Monitor WBC    Outcome: Progressing    Problem: DISCHARGE PLANNING  Goal: Discharge to home or other facility with appropriate resources  Description: INTERVENTIONS:  - Identify barriers to discharge w/patient and caregiver  - Arrange for needed discharge resources and transportation as appropriate  - Identify discharge learning needs (meds, wound care, etc.)  - Arrange for interpretive services to assist at discharge as needed  - Refer to Case Management Department for coordinating discharge planning if the patient needs post-hospital services based on physician/advanced  practitioner order or complex needs related to functional status, cognitive ability, or social support system  Outcome: Progressing

## 2025-04-03 PROCEDURE — 99232 SBSQ HOSP IP/OBS MODERATE 35: CPT | Performed by: INTERNAL MEDICINE

## 2025-04-03 PROCEDURE — 97530 THERAPEUTIC ACTIVITIES: CPT

## 2025-04-03 PROCEDURE — 94760 N-INVAS EAR/PLS OXIMETRY 1: CPT

## 2025-04-03 PROCEDURE — 94664 DEMO&/EVAL PT USE INHALER: CPT | Performed by: SOCIAL WORKER

## 2025-04-03 PROCEDURE — 94640 AIRWAY INHALATION TREATMENT: CPT

## 2025-04-03 PROCEDURE — 97116 GAIT TRAINING THERAPY: CPT

## 2025-04-03 PROCEDURE — 99232 SBSQ HOSP IP/OBS MODERATE 35: CPT | Performed by: PHYSICIAN ASSISTANT

## 2025-04-03 PROCEDURE — 94660 CPAP INITIATION&MGMT: CPT | Performed by: SOCIAL WORKER

## 2025-04-03 RX ORDER — PREDNISONE 10 MG/1
10 TABLET ORAL DAILY
Status: DISCONTINUED | OUTPATIENT
Start: 2025-04-04 | End: 2025-04-04 | Stop reason: HOSPADM

## 2025-04-03 RX ADMIN — LEVALBUTEROL HYDROCHLORIDE 1.25 MG: 1.25 SOLUTION RESPIRATORY (INHALATION) at 08:07

## 2025-04-03 RX ADMIN — LEVALBUTEROL HYDROCHLORIDE 1.25 MG: 1.25 SOLUTION RESPIRATORY (INHALATION) at 14:22

## 2025-04-03 RX ADMIN — CLOTRIMAZOLE: 1 CREAM TOPICAL at 17:09

## 2025-04-03 RX ADMIN — LEVALBUTEROL HYDROCHLORIDE 1.25 MG: 1.25 SOLUTION RESPIRATORY (INHALATION) at 02:06

## 2025-04-03 RX ADMIN — IPRATROPIUM BROMIDE 0.5 MG: 0.5 SOLUTION RESPIRATORY (INHALATION) at 08:07

## 2025-04-03 RX ADMIN — DOCUSATE SODIUM 100 MG: 100 CAPSULE, LIQUID FILLED ORAL at 17:07

## 2025-04-03 RX ADMIN — LOSARTAN POTASSIUM 12.5 MG: 25 TABLET, FILM COATED ORAL at 13:37

## 2025-04-03 RX ADMIN — TORSEMIDE 40 MG: 20 TABLET ORAL at 09:28

## 2025-04-03 RX ADMIN — PREDNISONE 5 MG: 5 TABLET ORAL at 09:29

## 2025-04-03 RX ADMIN — IPRATROPIUM BROMIDE 0.5 MG: 0.5 SOLUTION RESPIRATORY (INHALATION) at 14:22

## 2025-04-03 RX ADMIN — LEVALBUTEROL HYDROCHLORIDE 1.25 MG: 1.25 SOLUTION RESPIRATORY (INHALATION) at 19:35

## 2025-04-03 RX ADMIN — BUDESONIDE 0.5 MG: 0.5 INHALANT RESPIRATORY (INHALATION) at 08:07

## 2025-04-03 RX ADMIN — HEPARIN SODIUM 5000 UNITS: 5000 INJECTION INTRAVENOUS; SUBCUTANEOUS at 05:46

## 2025-04-03 RX ADMIN — DOCUSATE SODIUM 100 MG: 100 CAPSULE, LIQUID FILLED ORAL at 09:28

## 2025-04-03 RX ADMIN — FLUTICASONE PROPIONATE 2 SPRAY: 50 SPRAY, METERED NASAL at 09:30

## 2025-04-03 RX ADMIN — Medication 1 TABLET: at 09:31

## 2025-04-03 RX ADMIN — IPRATROPIUM BROMIDE 0.5 MG: 0.5 SOLUTION RESPIRATORY (INHALATION) at 19:35

## 2025-04-03 RX ADMIN — IPRATROPIUM BROMIDE 0.5 MG: 0.5 SOLUTION RESPIRATORY (INHALATION) at 02:07

## 2025-04-03 RX ADMIN — ASPIRIN 81 MG CHEWABLE TABLET 81 MG: 81 TABLET CHEWABLE at 09:29

## 2025-04-03 RX ADMIN — CLOTRIMAZOLE: 1 CREAM TOPICAL at 09:30

## 2025-04-03 RX ADMIN — POLYETHYLENE GLYCOL 3350 17 G: 17 POWDER, FOR SOLUTION ORAL at 09:29

## 2025-04-03 RX ADMIN — BUDESONIDE 0.5 MG: 0.5 INHALANT RESPIRATORY (INHALATION) at 19:35

## 2025-04-03 RX ADMIN — HEPARIN SODIUM 5000 UNITS: 5000 INJECTION INTRAVENOUS; SUBCUTANEOUS at 23:00

## 2025-04-03 RX ADMIN — HEPARIN SODIUM 5000 UNITS: 5000 INJECTION INTRAVENOUS; SUBCUTANEOUS at 13:35

## 2025-04-03 RX ADMIN — FORMOTEROL FUMARATE DIHYDRATE 20 MCG: 20 SOLUTION RESPIRATORY (INHALATION) at 19:50

## 2025-04-03 NOTE — ASSESSMENT & PLAN NOTE
Wt Readings from Last 3 Encounters:   04/03/25 46.1 kg (101 lb 10.1 oz)   02/21/25 44.3 kg (97 lb 11.2 oz)   09/06/24 44.9 kg (99 lb)     Clear from heart failure perspective for discharge

## 2025-04-03 NOTE — PROGRESS NOTES
Progress Note - Pulmonology   Name: Natalio Hartmann Jr. 69 y.o. male I MRN: [id number]  Unit/Bed#: [facility] 431-01 I Date of Admission: 3/14/2025   Date of Service: 4/3/2025 I Hospital Day: 20     Assessment & Plan  Acute on chronic respiratory failure with hypoxia and hypercapnia (HCC)  Acute on Chronic Hypoxic Hypercapnic respiratory failure 2/2 COPD/CHF exacerbation S/P MICU admission for IV diuresis, intermittent steroid dosing. Transferred to medical floor.     Not a candidate for at-home My-Airvo 3 due to nocturnal Trilogy usage  BIPAP 15/6 at night and as needed.  C/W Fan therapy  Patient awaiting insurance approval for [facility] to which he has been accepted - now reporting no longer interested in [facility].  COPD exacerbation (HCC)  Fully resolved at this point  Baseline FEV1 22%  Baseline O2 requirement 4L with nocutrnal Trilogy  Continue nebulized bronchodilators budesonide, formoterol  Pulmonary toileting  Will C/W prednisone 10 mg daily (baseline outpt steroid dosing)  Obstructive sleep apnea  On Trilogy NIV at night at home  C/W BIPAP as above  Goals of care, counseling/discussion  Family meeting 3/28, patient to continue current management and is not willing to go to acute rehab.   Palliative care by specialist  Palliative team following    24 Hour Events : No acute events overnight  Subjective : Seen and examined at bedside asking to be placed on BiPAP due to worsening SOB after having breakfast.  Denies chest pain, reports chest tightness    Objective :  Temp:  [97.9 °F (36.6 °C)-98.3 °F (36.8 °C)] 97.9 °F (36.6 °C)  HR:  [67-79] 79  BP: ()/(58-71) 95/60  Resp:  [17-18] 18  SpO2:  [97 %-100 %] 100 %  O2 Device: Nasal cannula  Nasal Cannula O2 Flow Rate (L/min):  [4 L/min] 4 L/min    Physical Exam  Vitals reviewed.   Constitutional:       Comments: Markedly cachectic   HENT:      Head: Normocephalic and atraumatic.   Cardiovascular:      Rate and Rhythm: Normal rate and regular rhythm.   Pulmonary:      Effort:  Pulmonary effort is normal. No respiratory distress.      Breath sounds: Wheezing present.      Comments: Diminished breath sounds throughout  Musculoskeletal:      Right lower leg: No edema.      Left lower leg: No edema.   Neurological:      Mental Status: He is alert.         Lab Results: I have reviewed the following results:   No new results in last 24 hours.    ABG: No new results in last 24 hours.    Imaging Results Review: No pertinent imaging studies reviewed.  Other Study Results Review: No additional pertinent studies reviewed.  PFT Results Reviewed: reviewed

## 2025-04-03 NOTE — CASE MANAGEMENT
Case Management Discharge Planning Note    Patient name Natalio Hartmann Jr.  [facility]N [id number]  : 1955 Date 4/3/2025       Current Admission Date: 3/14/2025  Current Admission Diagnosis:Acute on chronic respiratory failure with hypoxia and hypercapnia (HCC)   Patient Active Problem List    Diagnosis Date Noted Date Diagnosed    Iron deficiency anemia secondary to inadequate dietary iron intake 2024     SIADH (syndrome of inappropriate ADH production) ([facility]) 2024     Type 2 diabetes mellitus without complication, with long-term current use of insulin ([facility]) 2024     End stage COPD ([facility]) 2024     Dependence on respirator (ventilator) status ([facility]) 01/10/2024     Severe protein-calorie malnutrition ([facility]) 10/14/2023     History of bladder cancer 2023     Pulmonary cachexia due to COPD ([facility])      Chronic hypercapnia/KEVIN 2023     Metabolic encephalopathy 2023     Palliative care by specialist 2023     Alkalosis 2023     Malnutrition ([facility]) 06/10/2023     Hyperlipidemia 2023     COPD exacerbation ([facility]) 2023     Steroid-induced hyperglycemia 2023     Physical deconditioning 2023     Chronic anemia 2023     SIRS (systemic inflammatory response syndrome) 2023     Hyponatremia 2023     Acute on chronic systolic congestive heart failure ([facility]) 2022     Pulmonary nodules/lesions, multiple 2022     Prostate cancer ([facility]) 2021     BPH with obstruction/lower urinary tract symptoms 2021     Goals of care, counseling/discussion 2021     Acute on chronic respiratory failure with hypoxia and hypercapnia ([facility]) 2020     Macrocytosis without anemia 2019     Other insomnia 2019     GERD (gastroesophageal reflux disease) 2017     Nonobstructive atherosclerosis of coronary artery 2016     Mood disorder ([facility]) 2015     Prediabetes 2015      Osteoporosis 10/14/2014     Vitamin D deficiency 10/14/2014     Nonischemic cardiomyopathy (HCC) 09/26/2014     Left bundle-branch block 08/03/2013     Osteoarthritis 08/03/2013     Obstructive sleep apnea 07/29/2013       LOS (days): 20  Geometric Mean LOS (GMLOS) (days): 3.9  Days to GMLOS:-16.2     OBJECTIVE:  Risk of Unplanned Readmission Score: 24.45         Current admission status: Inpatient   Preferred Pharmacy:   [id number] - [place], [place] - [address]  [place] [address]  Phone: [phone] Fax: [phone]    Primary Care Provider: Fatmata Gustafson DO    Primary Insurance: BLUE CROSS  Secondary Insurance: MEDICARE    DISCHARGE DETAILS:    CM received update that auth was going to P2P review. CM team will continue to follow.

## 2025-04-03 NOTE — ASSESSMENT & PLAN NOTE
"Palliative Diagnosis: end stage COPD    Goals:   Patient continues to have competing goals.  He does not find his quality of life in hospital to be acceptable, but also, does not wish to engage in any end of life cares or set any limits on cares.  Explicitly declines hospice.  This is similar to previous conversations our team has had during his healthcare journey.  Nevertheless, he was more welcoming of our team than previously and if he continues to allow it I believe he would benefit from ongoing rapport building and Palliative Care support.    Patient remains hopeful to be approved for acute rehab, pending insurance auth for [facility].   Spouse, Adelita, shares patient's goals but is also insightful to poor prognosis and is open to discussing hospice in the future pending course.  See ACP note dated 3/28 regarding family meeting  Palliative will follow for ongoing goals of care discussions as situation evolves.    Social Support:  Patient's support system: spouse, son  Supportive listening provided specifically to spouse, Adelita, at bedside  Normalized experience of patient  Advocated for patient/family with interdisciplinary team      Follow-up  We appreciate the opportunity to participate in this patient's care.   We will continue to follow while admitted.  Patient is eligible for the palliative home program upon discharge  Please do not hesitate to contact our on-call provider through EPIC Secure Chat or contact [phone] should there be an acute change or other symptom control concerns.    Symptom Mgmt:  Patient declines symptomatic management on dyspnea on the basis of  \"morphine killed [his] mother\" and he declines to take \"any of those medications.\"       Please do not make any changes to symptom medications (opioid analgesics, nonopioid analgesics, antiemetics, etc) without first consulting the on-call Palliative Care provider for your specific campus; including after hours and on weekends. We are available " 24/7, and can be contacted on Epic secure chat or at [phone].

## 2025-04-03 NOTE — ASSESSMENT & PLAN NOTE
Malnutrition Findings:     Body mass index is 18.59 kg/m².   Encourage adequate protein and calorie intake

## 2025-04-03 NOTE — ASSESSMENT & PLAN NOTE
Acute on Chronic Hypoxic Hypercapnic respiratory failure 2/2 COPD/CHF exacerbation S/P MICU admission for IV diuresis, intermittent steroid dosing. Transferred to medical floor.     Not a candidate for at-home My-Airvo 3 due to nocturnal Trilogy usage  BIPAP 15/6 at night and as needed.  C/W Fan therapy  Patient awaiting insurance approval for [facility] to which he has been accepted - now reporting no longer interested in [facility].

## 2025-04-03 NOTE — PLAN OF CARE
Problem: PHYSICAL THERAPY ADULT  Goal: Performs mobility at highest level of function for planned discharge setting.  See evaluation for individualized goals.  Description: Treatment/Interventions: ADL retraining, Functional transfer training, LE strengthening/ROM, Elevations, Therapeutic exercise, Endurance training, Cognitive reorientation, Patient/family training, Equipment eval/education, Gait training, Bed mobility, Spoke to MD, Spoke to nursing, Spoke to case management, OT (Pt was seen in conjunction w/ OT 2* unstable presentation; medical complexity; new precautions/ limitations + limited activity tolerance and regression from baseline functional mobility.)  Equipment Recommended: Walker       See flowsheet documentation for full assessment, interventions and recommendations.  Outcome: Progressing  Note: Prognosis: Fair  Problem List: Decreased strength, Decreased endurance, Impaired balance, Decreased mobility, Decreased coordination, Pain, Decreased cognition  Assessment: Patient presents pleasantly and agreeable to therapy session today. Session consisted of functional mobility and ambulation. Patient requires supervision for bed mobility, min assist x 1 for sit to stand transfers, and CGA x 1 for ambulation with RW. Patient reports 0/10 pain, but feels like he can't get enough air with exertion- Sp02 read no lower than 93% throughout session while on 6 L NC O2. Patient demonstrates limited endurance, decreased LE strength, decreased functional mobility, and limited prolonged ambulation. Therefore, pt will benefit from skilled therapy in order to improve overall endurance levels, increase BLE strength and allow for greater safety during ADLs. Based on these findings and the pt's AM-PAC score of 17, we recommend level II to allow for optimal recovery and return to baseline function ADLs. As long as pt is admitted, therapy will follow for 3-5 days/week to address the aforementioned deficits to allow for  optimal re-integration into household and community ADLs and improved quality of life.  Barriers to Discharge: Inaccessible home environment, Decreased caregiver support  Barriers to Discharge Comments: CARLO home; alone at times  Rehab Resource Intensity Level, PT: II (Moderate Resource Intensity)    See flowsheet documentation for full assessment.

## 2025-04-03 NOTE — PLAN OF CARE
Problem: Potential for Falls  Goal: Patient will remain free of falls  Description: INTERVENTIONS:  - Educate patient/family on patient safety including physical limitations  - Instruct patient to call for assistance with activity   - Consult OT/PT to assist with strengthening/mobility   - Keep Call bell within reach  - Keep bed low and locked with side rails adjusted as appropriate  - Keep care items and personal belongings within reach  - Initiate and maintain comfort rounds  - Make Fall Risk Sign visible to staff  - Offer Toileting every 2 Hours, in advance of need  - Initiate/Maintain bed/chair alarm  - Obtain necessary fall risk management equipment:   - Apply yellow socks and bracelet for high fall risk patients  - Consider moving patient to room near nurses station  Outcome: Progressing     Problem: PAIN - ADULT  Goal: Verbalizes/displays adequate comfort level or baseline comfort level  Description: Interventions:  - Encourage patient to monitor pain and request assistance  - Assess pain using appropriate pain scale  - Administer analgesics based on type and severity of pain and evaluate response  - Implement non-pharmacological measures as appropriate and evaluate response  - Consider cultural and social influences on pain and pain management  - Notify physician/advanced practitioner if interventions unsuccessful or patient reports new pain  Outcome: Progressing     Problem: INFECTION - ADULT  Goal: Absence or prevention of progression during hospitalization  Description: INTERVENTIONS:  - Assess and monitor for signs and symptoms of infection  - Monitor lab/diagnostic results  - Monitor all insertion sites, i.e. indwelling lines, tubes, and drains  - Monitor endotracheal if appropriate and nasal secretions for changes in amount and color  - Brayton appropriate cooling/warming therapies per order  - Administer medications as ordered  - Instruct and encourage patient and family to use good hand hygiene  technique  - Identify and instruct in appropriate isolation precautions for identified infection/condition  Outcome: Progressing  Goal: Absence of fever/infection during neutropenic period  Description: INTERVENTIONS:  - Monitor WBC    Outcome: Progressing     Problem: SAFETY ADULT  Goal: Patient will remain free of falls  Description: INTERVENTIONS:  - Educate patient/family on patient safety including physical limitations  - Instruct patient to call for assistance with activity   - Consult OT/PT to assist with strengthening/mobility   - Keep Call bell within reach  - Keep bed low and locked with side rails adjusted as appropriate  - Keep care items and personal belongings within reach  - Initiate and maintain comfort rounds  - Make Fall Risk Sign visible to staff  - Offer Toileting every 2 Hours, in advance of need  - Initiate/Maintain bed/chair alarm  - Obtain necessary fall risk management equipment:   - Apply yellow socks and bracelet for high fall risk patients  - Consider moving patient to room near nurses station  Outcome: Progressing  Goal: Maintain or return to baseline ADL function  Description: INTERVENTIONS:  -  Assess patient's ability to carry out ADLs; assess patient's baseline for ADL function and identify physical deficits which impact ability to perform ADLs (bathing, care of mouth/teeth, toileting, grooming, dressing, etc.)  - Assess/evaluate cause of self-care deficits   - Assess range of motion  - Assess patient's mobility; develop plan if impaired  - Assess patient's need for assistive devices and provide as appropriate  - Encourage maximum independence but intervene and supervise when necessary  - Involve family in performance of ADLs  - Assess for home care needs following discharge   - Consider OT consult to assist with ADL evaluation and planning for discharge  - Provide patient education as appropriate  Outcome: Progressing  Goal: Maintains/Returns to pre admission functional  level  Description: INTERVENTIONS:  - Perform AM-PAC 6 Click Basic Mobility/ Daily Activity assessment daily.  - Set and communicate daily mobility goal to care team and patient/family/caregiver.   - Collaborate with rehabilitation services on mobility goals if consulted  - Perform Range of Motion 3 times a day.  - Reposition patient every 2 hours.  - Dangle patient 3 times a day  - Stand patient 3 times a day  - Ambulate patient 3 times a day  - Out of bed to chair 3 times a day   - Out of bed for meals 3 times a day  - Out of bed for toileting  - Record patient progress and toleration of activity level   Outcome: Progressing     Problem: DISCHARGE PLANNING  Goal: Discharge to home or other facility with appropriate resources  Description: INTERVENTIONS:  - Identify barriers to discharge w/patient and caregiver  - Arrange for needed discharge resources and transportation as appropriate  - Identify discharge learning needs (meds, wound care, etc.)  - Arrange for interpretive services to assist at discharge as needed  - Refer to Case Management Department for coordinating discharge planning if the patient needs post-hospital services based on physician/advanced practitioner order or complex needs related to functional status, cognitive ability, or social support system  Outcome: Progressing     Problem: Knowledge Deficit  Goal: Patient/family/caregiver demonstrates understanding of disease process, treatment plan, medications, and discharge instructions  Description: Complete learning assessment and assess knowledge base.  Interventions:  - Provide teaching at level of understanding  - Provide teaching via preferred learning methods  Outcome: Progressing     Problem: Nutrition/Hydration-ADULT  Goal: Nutrient/Hydration intake appropriate for improving, restoring or maintaining nutritional needs  Description: Monitor and assess patient's nutrition/hydration status for malnutrition. Collaborate with interdisciplinary  team and initiate plan and interventions as ordered.  Monitor patient's weight and dietary intake as ordered or per policy. Utilize nutrition screening tool and intervene as necessary. Determine patient's food preferences and provide high-protein, high-caloric foods as appropriate.     INTERVENTIONS:  - Monitor oral intake, urinary output, labs, and treatment plans  - Assess nutrition and hydration status and recommend course of action  - Evaluate amount of meals eaten  - Assist patient with eating if necessary   - Allow adequate time for meals  - Recommend/ encourage appropriate diets, oral nutritional supplements, and vitamin/mineral supplements  - Order, calculate, and assess calorie counts as needed  - Recommend, monitor, and adjust tube feedings and TPN/PPN based on assessed needs  - Assess need for intravenous fluids  - Provide specific nutrition/hydration education as appropriate  - Include patient/family/caregiver in decisions related to nutrition  Outcome: Progressing     Problem: Prexisting or High Potential for Compromised Skin Integrity  Goal: Skin integrity is maintained or improved  Description: INTERVENTIONS:  - Identify patients at risk for skin breakdown  - Assess and monitor skin integrity  - Assess and monitor nutrition and hydration status  - Monitor labs   - Assess for incontinence   - Turn and reposition patient  - Assist with mobility/ambulation  - Relieve pressure over bony prominences  - Avoid friction and shearing  - Provide appropriate hygiene as needed including keeping skin clean and dry  - Evaluate need for skin moisturizer/barrier cream  - Collaborate with interdisciplinary team   - Patient/family teaching  - Consider wound care consult   Outcome: Progressing     Problem: RESPIRATORY - ADULT  Goal: Achieves optimal ventilation and oxygenation  Description: INTERVENTIONS:- Assess for changes in respiratory status- Assess for changes in mentation and behavior- Position to facilitate  oxygenation and minimize respiratory effort- Oxygen administered by appropriate delivery if ordered- Initiate smoking cessation education as indicated- Encourage broncho-pulmonary hygiene including cough, deep breathe, Incentive Spirometry- Assess the need for suctioning and aspirate as needed- Assess and instruct to report SOB or any respiratory difficulty- Respiratory Therapy support as indicated  Outcome: Progressing

## 2025-04-03 NOTE — PHYSICAL THERAPY NOTE
PHYSICAL THERAPY NOTE          Patient Name: Natalio Hartmann Jr.  Today's Date: 4/3/2025        04/03/25 1408   PT Last Visit   PT Visit Date 04/03/25   Note Type   Note Type Treatment   Pain Assessment   Pain Assessment Tool 0-10   Pain Score No Pain   Restrictions/Precautions   Weight Bearing Precautions Per Order No   Other Precautions Chair Alarm;Bed Alarm;Multiple lines;Telemetry;O2;Fall Risk;Pain;Cognitive   General   Chart Reviewed Yes   Response to Previous Treatment Patient with no complaints from previous session.   Family/Caregiver Present Yes  (wife)   Cognition   Overall Cognitive Status Impaired   Arousal/Participation Responsive   Attention Attends with cues to redirect   Orientation Level Oriented X4   Memory Within functional limits   Following Commands Follows one step commands with increased time or repetition   Subjective   Subjective pt pleasant and cooperative throughout therapy session. pt recieved supine in bed.   Bed Mobility   Supine to Sit 5  Supervision   Additional items Verbal cues   Sit to Supine 5  Supervision   Additional items Verbal cues   Transfers   Sit to Stand 4  Minimal assistance   Additional items Assist x 1;Verbal cues;Increased time required   Stand to Sit 4  Minimal assistance   Additional items Assist x 1;Verbal cues;Increased time required   Additional Comments Transfers with RW   Ambulation/Elevation   Gait pattern Improper Weight shift;Narrow CARLY;Forward Flexion;Decreased foot clearance;Shuffling;Inconsistent jonathan;Foward flexed;Knees flexed   Gait Assistance 4  Minimal assist   Additional items Assist x 1;Verbal cues;Tactile cues  (CGA)   Assistive Device Rolling walker   Distance 50' + 50'  (Prolonged seated rest breaks)   Stair Management Assistance Not tested   Ambulation/Elevation Additional Comments (S)  Chair follow. No decrease in Sp02 during ambulation, stayed within mid-low 90s.    Balance   Static Sitting Good   Dynamic Sitting Fair +   Static Standing Fair   Dynamic Standing Poor +   Ambulatory Poor +   Endurance Deficit   Endurance Deficit Yes   Endurance Deficit Description Generalized weakness & decondtioning   Activity Tolerance   Activity Tolerance Patient tolerated treatment well;Patient limited by fatigue   Medical Staff Made Aware PT MELONY Velasquez, co-eval due to medical complexity   Nurse Made Aware RN cleared & updated   Assessment   Prognosis Fair   Problem List Decreased strength;Decreased endurance;Impaired balance;Decreased mobility;Decreased coordination;Pain;Decreased cognition   Assessment Patient presents pleasantly and agreeable to therapy session today. Session consisted of functional mobility and ambulation. Patient requires supervision for bed mobility, min assist x 1 for sit to stand transfers, and CGA x 1 for ambulation with RW. Patient reports 0/10 pain, but feels like he can't get enough air with exertion- Sp02 read no lower than 93% throughout session while on 6 L NC O2. Patient demonstrates limited endurance, decreased LE strength, decreased functional mobility, and limited prolonged ambulation. Therefore, pt will benefit from skilled therapy in order to improve overall endurance levels, increase BLE strength and allow for greater safety during ADLs. Based on these findings and the pt's AM-PAC score of 17, we recommend level II to allow for optimal recovery and return to baseline function ADLs. As long as pt is admitted, therapy will follow for 3-5 days/week to address the aforementioned deficits to allow for optimal re-integration into household and community ADLs and improved quality of life.   Barriers to Discharge Inaccessible home environment;Decreased caregiver support   Goals   Patient Goals to eat   STG Expiration Date 04/17/25   PT Treatment Day 5   Plan   Treatment/Interventions ADL retraining;Functional transfer training;LE  strengthening/ROM;Elevations;Therapeutic exercise;Endurance training;Bed mobility;Gait training;Spoke to nursing;OT   Progress Slow progress, decreased activity tolerance   PT Frequency 3-5x/wk   Discharge Recommendation   Rehab Resource Intensity Level, PT II (Moderate Resource Intensity)   AM-PAC Basic Mobility Inpatient   Turning in Flat Bed Without Bedrails 3   Lying on Back to Sitting on Edge of Flat Bed Without Bedrails 3   Moving Bed to Chair 3   Standing Up From Chair Using Arms 3   Walk in Room 3   Climb 3-5 Stairs With Railing 2   Basic Mobility Inpatient Raw Score 17   Basic Mobility Standardized Score 39.67   [facility] Highest Level Of Mobility   -HLM Goal 5: Stand one or more mins   -HLM Achieved 7: Walk 25 feet or more   Education   Education Provided Mobility training   Patient Reinforcement needed   End of Consult   Patient Position at End of Consult Bedside chair;Bed/Chair alarm activated;All needs within reach     Anant Quintero, SPT

## 2025-04-03 NOTE — DISCHARGE SUPPORT
Case Management Assessment & Discharge Planning Note    Patient name Natalio Hartmann Jr.  [facility] [id number]  : 1955 Date 4/3/2025       Current Admission Date: 3/14/2025  Current Admission Diagnosis:Acute on chronic respiratory failure with hypoxia and hypercapnia ([facility])   Patient Active Problem List    Diagnosis Date Noted Date Diagnosed    Iron deficiency anemia secondary to inadequate dietary iron intake 2024     SIADH (syndrome of inappropriate ADH production) ([facility]) 2024     Type 2 diabetes mellitus without complication, with long-term current use of insulin ([facility]) 2024     End stage COPD ([facility]) 2024     Dependence on respirator (ventilator) status ([facility]) 01/10/2024     Severe protein-calorie malnutrition ([facility]) 10/14/2023     History of bladder cancer 2023     Pulmonary cachexia due to COPD ([facility])      Chronic hypercapnia/KEVIN 2023     Metabolic encephalopathy 2023     Palliative care by specialist 2023     Alkalosis 2023     Malnutrition ([facility]) 06/10/2023     Hyperlipidemia 2023     COPD exacerbation ([facility]) 2023     Steroid-induced hyperglycemia 2023     Physical deconditioning 2023     Chronic anemia 2023     SIRS (systemic inflammatory response syndrome) 2023     Hyponatremia 2023     Acute on chronic systolic congestive heart failure ([facility]) 2022     Pulmonary nodules/lesions, multiple 2022     Prostate cancer ([facility]) 2021     BPH with obstruction/lower urinary tract symptoms 2021     Goals of care, counseling/discussion 2021     Acute on chronic respiratory failure with hypoxia and hypercapnia ([facility]) 2020     Macrocytosis without anemia 2019     Other insomnia 2019     GERD (gastroesophageal reflux disease) 2017     Nonobstructive atherosclerosis of coronary artery 2016     Mood disorder ([facility]) 2015     Prediabetes 2015      Osteoporosis 10/14/2014     Vitamin D deficiency 10/14/2014     Nonischemic cardiomyopathy ([facility]) 09/26/2014     Left bundle-branch block 08/03/2013     Osteoarthritis 08/03/2013     Obstructive sleep apnea 07/29/2013       LOS (days): 20  Geometric Mean LOS (GMLOS) (days): 3.9  Days to GMLOS:-16   Facility Auth Adverse Decision  DC Support Center has received: Denial  For Level of Care: Acute Rehab  Insurance: [facility]  Information obtained via : Phone  Name/Phone # of Rep who called in information: Aleta QURESHI#:[phone]  Reason for Adverse Decision: Does not meet criteria  Reference Number: [id number]  Facility [facility]: [facility] (formerly [facility]  Peer to Peer?: Offered  Peer to Peer Phone #: [phone]  P2P Deadline: N/A  For P2P Completion:: Information sent to Physician advisor group to complete P2P   notified: Lisa Rogers     Updates to authorization status will be noted in chart.    Please reach out to CM for updates on any clinical information.

## 2025-04-03 NOTE — PLAN OF CARE
Problem: OCCUPATIONAL THERAPY ADULT  Goal: Performs self-care activities at highest level of function for planned discharge setting.  See evaluation for individualized goals.  Description: Treatment Interventions: ADL retraining, Functional transfer training, UE strengthening/ROM, Endurance training, Cognitive reorientation, Patient/family training, Equipment evaluation/education, Compensatory technique education, Continued evaluation, Energy conservation, Activityengagement          See flowsheet documentation for full assessment, interventions and recommendations.   Outcome: Progressing  Note: Limitation: Decreased ADL status, Decreased UE strength, Decreased Safe judgement during ADL, Decreased endurance, Decreased self-care trans, Decreased high-level ADLs  Prognosis: Fair  Assessment: Pt was seen on 4/3/2025 to address ADL retraining, functional transfer training, and activity tolerance/endurance. Pt with active OT orders and activity orders. Pt demonstrating improvements and currently requires CGA to ambulate short household distances with rw and seated rest break, pt O2 stayed in 90s with 6 L O2. Pt requires MIN A to complete functional transfers and MAX A to complete humaira hygiene after BM. Pt is limited by decreased ADL status, functional transfers, functional mobility, and activity tolerance. Pt supine in bed at beginning of session and seated in bedside chair at end of session with alarm set and all items within reach. The patient's raw score on the AM-PAC Daily Activity Inpatient Short Form is 14. A raw score of less than 19 suggests the patient may benefit from discharge to post-acute rehabilitation services. Please refer to the recommendation of the Occupational Therapist for safe discharge planning. Recommend Level II moderate intensity OT services at d/c to maximize pt function.     Rehab Resource Intensity Level, OT: II (Moderate Resource Intensity)

## 2025-04-03 NOTE — DISCHARGE SUPPORT
Per Availity, auth still pended at this time. VM left for Aleta looking to check status of auth and if additional clinical was received/reviewed. CM notified.

## 2025-04-03 NOTE — ASSESSMENT & PLAN NOTE
Wt Readings from Last 3 Encounters:   04/03/25 46.1 kg (101 lb 10.1 oz)   02/21/25 44.3 kg (97 lb 11.2 oz)   09/06/24 44.9 kg (99 lb)   Known history of nonischemic cardiomyopathy with LVEF 25%  Presents with worsening shortness of breath volume overload state noted  transitioned to PO diuretics on 3/25- torsemide 40 mg daily  I's/O, daily weight

## 2025-04-03 NOTE — OCCUPATIONAL THERAPY NOTE
Occupational Therapy Progress Note     Patient Name: Natalio Hartmann Jr.  Today's Date: 4/3/2025  Problem List  Principal Problem:    Acute on chronic respiratory failure with hypoxia and hypercapnia (HCC)  Active Problems:    Obstructive sleep apnea    Goals of care, counseling/discussion    Acute on chronic systolic congestive heart failure (HCC)    SIRS (systemic inflammatory response syndrome)    COPD exacerbation (HCC)    Hyperlipidemia    Malnutrition (HCC)    Palliative care by specialist    Metabolic encephalopathy    Severe protein-calorie malnutrition (HCC)        04/03/25 1409   OT Last Visit   OT Visit Date 04/03/25   Note Type   Note Type Treatment   Pain Assessment   Pain Assessment Tool 0-10   Pain Score No Pain   Restrictions/Precautions   Weight Bearing Precautions Per Order No   Other Precautions Chair Alarm;Bed Alarm;Cognitive;Multiple lines;Telemetry;O2;Fall Risk   Lifestyle   Autonomy I w/ ADLS, assist IADLS, Mod I w/ transfers and functional mobility PTA   Reciprocal Relationships Pt lives w/ his spouse and son   Service to Others retired; demolition   Intrinsic Gratification building model cars   ADL   Where Assessed   (standing EOB)   Grooming Assistance 4  Minimal Assistance   UB Bathing Assistance 3  Moderate Assistance   LB Bathing Assistance 2  Maximal Assistance   UB Dressing Assistance 3  Moderate Assistance   LB Dressing Assistance 2  Maximal Assistance   Toileting Assistance  2  Maximal Assistance   Toileting Deficit Steadying;Increased time to complete;Perineal hygiene   Toileting Comments Pt requires MAX A to complete humaira hygiene after BM   Bed Mobility   Supine to Sit 5  Supervision   Additional items Increased time required;Verbal cues   Transfers   Sit to Stand 4  Minimal assistance   Additional items Assist x 1;Increased time required;Verbal cues   Stand to Sit 4  Minimal assistance   Additional items Assist x 1;Increased time required;Verbal cues   Additional Comments with rw    Functional Mobility   Functional Mobility 4  Minimal assistance  (CGA)   Additional Comments Pt requires CGA to ambulate short household distances with rw and seated rest break. Pt O2 remained in 90s during functional mobilty on 6 L O2   Additional items Rolling walker   Cognition   Overall Cognitive Status Impaired   Arousal/Participation Responsive;Alert;Cooperative   Attention Attends with cues to redirect   Orientation Level Oriented X4   Memory Within functional limits   Following Commands Follows one step commands with increased time or repetition   Comments Pt agreeable to therapy. Pt requires cues for safety   Activity Tolerance   Activity Tolerance Patient limited by fatigue   Medical Staff Made Aware SPT, PT, RN Cleared   Assessment   Assessment Pt was seen on 4/3/2025 to address ADL retraining, functional transfer training, and activity tolerance/endurance. Pt with active OT orders and activity orders. Pt demonstrating improvements and currently requires CGA to ambulate short household distances with rw and seated rest break, pt O2 stayed in 90s with 6 L O2. Pt requires MIN A to complete functional transfers and MAX A to complete humaira hygiene after BM. Pt is limited by decreased ADL status, functional transfers, functional mobility, and activity tolerance. Pt supine in bed at beginning of session and seated in bedside chair at end of session with alarm set and all items within reach. The patient's raw score on the AM-PAC Daily Activity Inpatient Short Form is 14. A raw score of less than 19 suggests the patient may benefit from discharge to post-acute rehabilitation services. Please refer to the recommendation of the Occupational Therapist for safe discharge planning. Recommend Level II moderate intensity OT services at d/c to maximize pt function.   Plan   Treatment Interventions ADL retraining;Functional transfer training;UE strengthening/ROM;Endurance training;Cognitive reorientation;Patient/family  training;Equipment evaluation/education;Neuromuscular reeducation;Fine motor coordination activities;Compensatory technique education;Continued evaluation;Activityengagement;Energy conservation   Goal Expiration Date 04/15/25   OT Treatment Day 4   OT Frequency 2-3x/wk   Discharge Recommendation   Rehab Resource Intensity Level, OT II (Moderate Resource Intensity)   AM-PAC Daily Activity Inpatient   Lower Body Dressing 2   Bathing 2   Toileting 2   Upper Body Dressing 2   Grooming 3   Eating 3   Daily Activity Raw Score 14   Daily Activity Standardized Score (Calc for Raw Score >=11) 33.39   AM-PAC Applied Cognition Inpatient   Following a Speech/Presentation 3   Understanding Ordinary Conversation 4   Taking Medications 3   Remembering Where Things Are Placed or Put Away 3   Remembering List of 4-5 Errands 2   Taking Care of Complicated Tasks 2   Applied Cognition Raw Score 17   Applied Cognition Standardized Score 36.52   End of Consult   Education Provided Yes;Family or social support of family present for education by provider   Patient Position at End of Consult Bedside chair;Bed/Chair alarm activated;All needs within reach   Nurse Communication Nurse aware of consult     NOLAN Gusman, OTR/L

## 2025-04-03 NOTE — ASSESSMENT & PLAN NOTE
Malnutrition Findings:   Adult Malnutrition type: Chronic illness  Adult Degree of Malnutrition: Other severe protein calorie malnutrition  Malnutrition Characteristics: Fat loss, Muscle loss              360 Statement: severe malnutrition r/t chronic illness as evidenced by severe muscle loss around clavicles and shoulders, moderate-severe muscle loss in temples, severe buccal fat pad loss, apparent depression between ribs suggesting severe fat loss. Treatment: oral diet and oral nutrition supplements  BMI Findings:         Body mass index is 18.59 kg/m².

## 2025-04-03 NOTE — ASSESSMENT & PLAN NOTE
History of COPD and CHF, baseline O2 needs of 4 L via nasal cannula  Uses trilogy NIV at night and is on prednisone 10 mg daily chronically  Admitted to ICU for IV diuresis and respiratory support with BiPAP, steroids, nebulizers,  improved and tansferred to floor on 3/22  Most likely due to severe COPD  Pulmonology following  Currently back to baseline of 4 L nasal cannula with need of BiPAP at night and occasional through the day.  Continue to encourage to work with PT  Patient and his wife are now open to [facility] or [facility]; referral requested by case management, still full code, and not open to hospice

## 2025-04-03 NOTE — ASSESSMENT & PLAN NOTE
Continues to have good appetite, picture consistent with pulmonary cachexia    Malnutrition Findings:   Adult Malnutrition type: Chronic illness  Adult Degree of Malnutrition: Other severe protein calorie malnutrition  Malnutrition Characteristics: Fat loss, Muscle loss                  360 Statement: severe malnutrition r/t chronic illness as evidenced by severe muscle loss around clavicles and shoulders, moderate-severe muscle loss in temples, severe buccal fat pad loss, apparent depression between ribs suggesting severe fat loss. Treatment: oral diet and oral nutrition supplements    BMI Findings:           Body mass index is 18.59 kg/m².

## 2025-04-03 NOTE — ASSESSMENT & PLAN NOTE
See ACP note dated 3/28.   Patient is not ready to forego further hospitalizations or medical treatment at this time  His primary goal is to return home, but hopeful to go to [facility] where he is accepted pending insurance auth.  Patient continues to request full code, full cares. If clinical course changes, please engage palliative medicine for assistance.

## 2025-04-03 NOTE — PROGRESS NOTES
Progress Note - Hospitalist   Name: Natalio Hartmann Jr. 69 y.o. male I MRN: [id number]  Unit/Bed#: [facility] 431-01 I Date of Admission: 3/14/2025   Date of Service: 4/3/2025 I Hospital Day: 20    Assessment & Plan  Acute on chronic respiratory failure with hypoxia and hypercapnia (HCC)  History of COPD and CHF, baseline O2 needs of 4 L via nasal cannula  Uses trilogy NIV at night and is on prednisone 10 mg daily chronically  Admitted to ICU for IV diuresis and respiratory support with BiPAP, steroids, nebulizers,  improved and tansferred to floor on 3/22  Most likely due to severe COPD  Pulmonology following  Currently back to baseline of 4 L nasal cannula with need of BiPAP at night and occasional through the day.  Continue to encourage to work with PT  Patient and his wife are now open to [facility]; referral requested by case management, still full code, and not open to hospice  Acute on chronic systolic congestive heart failure (HCC)  Wt Readings from Last 3 Encounters:   04/03/25 46.1 kg (101 lb 10.1 oz)   02/21/25 44.3 kg (97 lb 11.2 oz)   09/06/24 44.9 kg (99 lb)   Known history of nonischemic cardiomyopathy with LVEF 25%  Presents with worsening shortness of breath volume overload state noted  transitioned to PO diuretics on 3/25- torsemide 40 mg daily  I's/O, daily weight  COPD exacerbation (HCC)  Monitored by pulmonology during hospitalization.  Low concern for acute exacerbation  Continue inhaler regimen and prednisone 10 mg daily  SIRS (systemic inflammatory response syndrome)  SIRS present on admission, patient initially covered for suspected pneumonia. Symptoms felt more likely to be related to CHF, and abx stopped after 4 days  Blood cultures negative  Monitor off antibiotics  Resolved  Obstructive sleep apnea  BiPAP as needed and at bedtime, patient appears to be tolerating  Metabolic encephalopathy  resolved  Secondary to respiratory failure which is multifactorial related to COPD and CHF.   Probable hypoxia and hypercapnia component as well  Status appears to be improving with nocturnal BIPAP  Continue supportive measures and monitor  Severe protein-calorie malnutrition (HCC)  Malnutrition Findings:     Body mass index is 18.59 kg/m².   Encourage adequate protein and calorie intake  Goals of care, counseling/discussion  Patient with multiple comorbidities and overall guarded prognosis.  Still continues Level 1 Full Code at this time as per family wishes  Palliative care team following, involvement appreciated  Hyperlipidemia  Resume Pravachol 80mg po qd  Malnutrition (HCC)  Malnutrition Findings:   Adult Malnutrition type: Chronic illness  Adult Degree of Malnutrition: Other severe protein calorie malnutrition  Malnutrition Characteristics: Fat loss, Muscle loss              360 Statement: severe malnutrition r/t chronic illness as evidenced by severe muscle loss around clavicles and shoulders, moderate-severe muscle loss in temples, severe buccal fat pad loss, apparent depression between ribs suggesting severe fat loss. Treatment: oral diet and oral nutrition supplements  BMI Findings:         Body mass index is 18.59 kg/m².   Palliative care by specialist  Appreciate palliative team input    VTE Pharmacologic Prophylaxis: VTE Score: 8 Moderate Risk (Score 3-4) - Pharmacological DVT Prophylaxis Ordered: ordered .    Mobility:   Basic Mobility Inpatient Raw Score: 16  JH-HLM Goal: 5: Stand one or more mins  JH-HLM Achieved: 1: Laying in bed  JH-HLM Goal NOT achieved. Continue with multidisciplinary rounding and encourage appropriate mobility to improve upon JH-HLM goals.    Patient Centered Rounds: I performed bedside rounds with nursing staff today.   Discussions with Specialists or Other Care Team Provider: CE    Education and Discussions with Family / Patient:  wife.     Current Length of Stay: 20 day(s)  Current Patient Status: Inpatient   Certification Statement: The patient will continue to  require additional inpatient hospital stay due to placement  Discharge Plan:  TBD    Code Status: Level 1 - Full Code    Subjective   No new complaints. Patient is considering to go home and doesn't want inpatient rehab anymore.     Objective :  Temp:  [97.9 °F (36.6 °C)-98.3 °F (36.8 °C)] 97.9 °F (36.6 °C)  HR:  [67-79] 79  BP: ()/(58-71) 95/60  Resp:  [17-18] 18  SpO2:  [97 %-100 %] 100 %  O2 Device: Nasal cannula  Nasal Cannula O2 Flow Rate (L/min):  [4 L/min] 4 L/min    Body mass index is 18.59 kg/m².     Input and Output Summary (last 24 hours):     Intake/Output Summary (Last 24 hours) at 4/3/2025 1312  Last data filed at 4/3/2025 0807  Gross per 24 hour   Intake 820 ml   Output 1200 ml   Net -380 ml       Physical Exam  Vitals and nursing note reviewed.   Cardiovascular:      Rate and Rhythm: Normal rate and regular rhythm.   Pulmonary:      Comments: Diminished breath sounds bilaterally  Abdominal:      General: Bowel sounds are normal.      Palpations: Abdomen is soft.   Neurological:      Mental Status: He is alert and oriented to person, place, and time.           Lines/Drains:  Lines/Drains/Airways       Active Status       Name Placement date Placement time Site Days    External Urinary Catheter 03/21/25  1300  -- 13                            Lab Results: I have reviewed the following results:   Results from last 7 days   Lab Units 04/01/25  0547   WBC Thousand/uL 5.83   HEMOGLOBIN g/dL 10.5*   HEMATOCRIT % 34.7*   PLATELETS Thousands/uL 173   SEGS PCT % 75   LYMPHO PCT % 11*   MONO PCT % 12   EOS PCT % 1     Results from last 7 days   Lab Units 04/01/25  0547   SODIUM mmol/L 136   POTASSIUM mmol/L 4.4   CHLORIDE mmol/L 88*   CO2 mmol/L 43*   BUN mg/dL 37*   CREATININE mg/dL 0.96   ANION GAP mmol/L 5   CALCIUM mg/dL 9.0   GLUCOSE RANDOM mg/dL 91                       Recent Cultures (last 7 days):         Imaging Results Review: No pertinent imaging studies reviewed.  Other Study Results Review:  No additional pertinent studies reviewed.    Last 24 Hours Medication List:     Current Facility-Administered Medications:     acetaminophen (TYLENOL) tablet 650 mg, Q6H PRN    albumin human (FLEXBUMIN) 5 % injection 25 g, Once PRN    albuterol inhalation solution 2.5 mg, Q4H PRN    aluminum-magnesium hydroxide-simethicone (MAALOX) oral suspension 30 mL, Q6H PRN    aspirin chewable tablet 81 mg, Daily    budesonide (PULMICORT) inhalation solution 0.5 mg, Q12H    calcium carbonate-vitamin D 500 mg-5 mcg tablet 1 tablet, Daily With Breakfast    clotrimazole (LOTRIMIN) 1 % cream, BID    docusate sodium (COLACE) capsule 100 mg, BID    fluticasone (FLONASE) 50 mcg/act nasal spray 2 spray, Daily    formoterol (PERFOROMIST) nebulizer solution 20 mcg, Q12H    heparin (porcine) subcutaneous injection 5,000 Units, Q8H GABE    ipratropium (ATROVENT) 0.02 % inhalation solution 0.5 mg, Q6H    levalbuterol (XOPENEX) inhalation solution 1.25 mg, Q6H    losartan (COZAAR) tablet 12.5 mg, Daily    ondansetron (ZOFRAN) injection 4 mg, Q6H PRN    polyethylene glycol (MIRALAX) packet 17 g, Daily    [START ON 4/4/2025] predniSONE tablet 10 mg, Daily    torsemide (DEMADEX) tablet 40 mg, Daily    Administrative Statements   Today, Patient Was Seen By: Remi Rodriguez MD  I have spent a total time of 35 minutes in caring for this patient on the day of the visit/encounter including Diagnostic results, Reviewing/placing orders in the medical record (including tests, medications, and/or procedures), Obtaining or reviewing history  , and Communicating with other healthcare professionals .    **Please Note: This note may have been constructed using a voice recognition system.**

## 2025-04-03 NOTE — PROGRESS NOTES
"Progress Note - Palliative Care   Name: Natalio Hartmann Jr. 69 y.o. male I MRN: [id number]  [facility] I Date of Admission: 3/14/2025   Date of Service: 4/3/2025 I Hospital Day: 20     Assessment & Plan  Palliative care by specialist  Palliative Diagnosis: end stage COPD    Goals:   Patient continues to have competing goals.  He does not find his quality of life in hospital to be acceptable, but also, does not wish to engage in any end of life cares or set any limits on cares.  Explicitly declines hospice.  This is similar to previous conversations our team has had during his healthcare journey.  Nevertheless, he was more welcoming of our team than previously and if he continues to allow it I believe he would benefit from ongoing rapport building and Palliative Care support.    Patient remains hopeful to be approved for acute rehab, pending insurance auth for [facility].   Spouse, Adelita, shares patient's goals but is also insightful to poor prognosis and is open to discussing hospice in the future pending course.  See ACP note dated 3/28 regarding family meeting  Palliative will follow for ongoing goals of care discussions as situation evolves.    Social Support:  Patient's support system: spouse, son  Supportive listening provided specifically to spouse, Adelita, at bedside  Normalized experience of patient  Advocated for patient/family with interdisciplinary team      Follow-up  We appreciate the opportunity to participate in this patient's care.   We will continue to follow while admitted.  Patient is eligible for the palliative home program upon discharge  Please do not hesitate to contact our on-call provider through EPIC Secure Chat or contact [phone] should there be an acute change or other symptom control concerns.    Symptom Mgmt:  Patient declines symptomatic management on dyspnea on the basis of  \"morphine killed [his] mother\" and he declines to take \"any of those medications.\"       Please do not " "make any changes to symptom medications (opioid analgesics, nonopioid analgesics, antiemetics, etc) without first consulting the on-call Palliative Care provider for your specific campus; including after hours and on weekends. We are available 24/7, and can be contacted on Epic secure chat or at [phone].    Acute on chronic respiratory failure with hypoxia and hypercapnia (HCC)  Pulmonary following for end stage COPD  Patient continues to alternate between supplemental O2 and BiPAP, both of which he is accustomed to using at home  Experiencing pulmonary cachexia but has a good appetite  Continue fan for dyspnea  Patient has declined symptomatic treatment of dyspnea with opioids reporting \"morphine killed\" his mother  Goals of care, counseling/discussion  See ACP note dated 3/28.   Patient is not ready to forego further hospitalizations or medical treatment at this time  His primary goal is to return home, but hopeful to go to [facility] where he is accepted pending insurance auth.  Patient continues to request full code, full cares. If clinical course changes, please engage palliative medicine for assistance.   Severe protein-calorie malnutrition (HCC)  Continues to have good appetite, picture consistent with pulmonary cachexia    Malnutrition Findings:   Adult Malnutrition type: Chronic illness  Adult Degree of Malnutrition: Other severe protein calorie malnutrition  Malnutrition Characteristics: Fat loss, Muscle loss                  360 Statement: severe malnutrition r/t chronic illness as evidenced by severe muscle loss around clavicles and shoulders, moderate-severe muscle loss in temples, severe buccal fat pad loss, apparent depression between ribs suggesting severe fat loss. Treatment: oral diet and oral nutrition supplements    BMI Findings:           Body mass index is 18.59 kg/m².     Acute on chronic systolic congestive heart failure (HCC)  Wt Readings from Last 3 Encounters:   04/03/25 46.1 kg (101 lb 10.1 " oz)   02/21/25 44.3 kg (97 lb 11.2 oz)   09/06/24 44.9 kg (99 lb)     Clear from heart failure perspective for discharge        Obstructive sleep apnea    COPD exacerbation (HCC)    Interval history:       No events overnight. Pending insurance approval for [facility]. Patient denies complaints. He expresses interest in working with PT/OT when available and expresses understanding of expectations of acute rehab. Continues to await for insurance auth. States his wife was at bedside earlier today but left.     MEDICATIONS / ALLERGIES:     all current active meds have been reviewed    No Known Allergies    OBJECTIVE:    Physical Exam  Physical Exam  Constitutional:       General: He is not in acute distress.  HENT:      Head: Atraumatic.   Eyes:      Conjunctiva/sclera: Conjunctivae normal.   Cardiovascular:      Rate and Rhythm: Normal rate.   Pulmonary:      Comments: On BiPAP face mask  Abdominal:      Tenderness: There is no guarding.   Musculoskeletal:         General: No swelling.   Skin:     General: Skin is warm and dry.   Neurological:      General: No focal deficit present.      Mental Status: Mental status is at baseline.   Psychiatric:         Mood and Affect: Mood normal.         Behavior: Behavior normal.         Thought Content: Thought content normal.         Judgment: Judgment normal.         Lab Results:   Results from last 7 days   Lab Units 04/01/25  0547 03/31/25  0545 03/30/25  0607   WBC Thousand/uL 5.83 6.17 6.48   HEMOGLOBIN g/dL 10.5* 9.8* 10.1*   HEMATOCRIT % 34.7* 33.5* 32.8*   PLATELETS Thousands/uL 173 173 187   SEGS PCT % 75 77* 75   MONO PCT % 12 10 12   EOS PCT % 1 1 1     Results from last 7 days   Lab Units 04/01/25  0547 03/31/25  0636 03/30/25  0607   POTASSIUM mmol/L 4.4 4.1 4.2   CHLORIDE mmol/L 88* 86* 88*   CO2 mmol/L 43* >45* >45*   BUN mg/dL 37* 30* 27*   CREATININE mg/dL 0.96 0.87 0.76   CALCIUM mg/dL 9.0 9.1 9.0       Imaging Studies: reviewed pertinent studies   EKG, Pathology,  and Other Studies: reviewed pertinent studies    Counseling / Coordination of Care    Total floor / unit time spent today 20+ minutes. Greater than 50% of total time was spent with the patient and / or family counseling and / or coordination of care. A description of the counseling / coordination of care: psychosocial support, chart review, lab review, goals of care, supportive listening, and anticipatory guidance

## 2025-04-04 VITALS
TEMPERATURE: 97.7 F | HEIGHT: 62 IN | BODY MASS INDEX: 17.47 KG/M2 | SYSTOLIC BLOOD PRESSURE: 113 MMHG | DIASTOLIC BLOOD PRESSURE: 62 MMHG | RESPIRATION RATE: 18 BRPM | HEART RATE: 71 BPM | OXYGEN SATURATION: 100 % | WEIGHT: 94.9 LBS

## 2025-04-04 PROBLEM — R65.10 SIRS (SYSTEMIC INFLAMMATORY RESPONSE SYNDROME) (HCC): Status: RESOLVED | Noted: 2023-02-17 | Resolved: 2025-04-04

## 2025-04-04 PROBLEM — G93.41 METABOLIC ENCEPHALOPATHY: Status: RESOLVED | Noted: 2023-07-20 | Resolved: 2025-04-04

## 2025-04-04 PROCEDURE — 94664 DEMO&/EVAL PT USE INHALER: CPT

## 2025-04-04 PROCEDURE — 94640 AIRWAY INHALATION TREATMENT: CPT

## 2025-04-04 PROCEDURE — 99239 HOSP IP/OBS DSCHRG MGMT >30: CPT | Performed by: INTERNAL MEDICINE

## 2025-04-04 PROCEDURE — 94660 CPAP INITIATION&MGMT: CPT

## 2025-04-04 PROCEDURE — 94760 N-INVAS EAR/PLS OXIMETRY 1: CPT

## 2025-04-04 RX ORDER — ASPIRIN 81 MG/1
81 TABLET, CHEWABLE ORAL DAILY
Qty: 30 TABLET | Refills: 2 | Status: SHIPPED | OUTPATIENT
Start: 2025-04-05

## 2025-04-04 RX ORDER — FORMOTEROL FUMARATE 20 UG/2ML
20 SOLUTION RESPIRATORY (INHALATION)
Qty: 120 ML | Refills: 0 | Status: SHIPPED | OUTPATIENT
Start: 2025-04-04 | End: 2025-04-04

## 2025-04-04 RX ORDER — LOSARTAN POTASSIUM 25 MG/1
12.5 TABLET ORAL DAILY
Qty: 30 TABLET | Refills: 0 | Status: SHIPPED | OUTPATIENT
Start: 2025-04-04

## 2025-04-04 RX ORDER — TORSEMIDE 20 MG/1
40 TABLET ORAL DAILY
Qty: 60 TABLET | Refills: 0 | Status: SHIPPED | OUTPATIENT
Start: 2025-04-05

## 2025-04-04 RX ORDER — POLYETHYLENE GLYCOL 3350 17 G/17G
17 POWDER, FOR SOLUTION ORAL DAILY PRN
Qty: 10 EACH | Refills: 0 | Status: SHIPPED | OUTPATIENT
Start: 2025-04-04 | End: 2025-04-11

## 2025-04-04 RX ADMIN — TORSEMIDE 40 MG: 20 TABLET ORAL at 09:18

## 2025-04-04 RX ADMIN — HEPARIN SODIUM 5000 UNITS: 5000 INJECTION INTRAVENOUS; SUBCUTANEOUS at 05:39

## 2025-04-04 RX ADMIN — HEPARIN SODIUM 5000 UNITS: 5000 INJECTION INTRAVENOUS; SUBCUTANEOUS at 13:46

## 2025-04-04 RX ADMIN — LOSARTAN POTASSIUM 12.5 MG: 25 TABLET, FILM COATED ORAL at 13:41

## 2025-04-04 RX ADMIN — IPRATROPIUM BROMIDE 0.5 MG: 0.5 SOLUTION RESPIRATORY (INHALATION) at 01:34

## 2025-04-04 RX ADMIN — BUDESONIDE 0.5 MG: 0.5 INHALANT RESPIRATORY (INHALATION) at 07:22

## 2025-04-04 RX ADMIN — DOCUSATE SODIUM 100 MG: 100 CAPSULE, LIQUID FILLED ORAL at 09:18

## 2025-04-04 RX ADMIN — IPRATROPIUM BROMIDE 0.5 MG: 0.5 SOLUTION RESPIRATORY (INHALATION) at 14:12

## 2025-04-04 RX ADMIN — CLOTRIMAZOLE: 1 CREAM TOPICAL at 09:19

## 2025-04-04 RX ADMIN — Medication 1 TABLET: at 09:18

## 2025-04-04 RX ADMIN — IPRATROPIUM BROMIDE 0.5 MG: 0.5 SOLUTION RESPIRATORY (INHALATION) at 07:22

## 2025-04-04 RX ADMIN — POLYETHYLENE GLYCOL 3350 17 G: 17 POWDER, FOR SOLUTION ORAL at 09:18

## 2025-04-04 RX ADMIN — LEVALBUTEROL HYDROCHLORIDE 1.25 MG: 1.25 SOLUTION RESPIRATORY (INHALATION) at 01:34

## 2025-04-04 RX ADMIN — LEVALBUTEROL HYDROCHLORIDE 1.25 MG: 1.25 SOLUTION RESPIRATORY (INHALATION) at 14:12

## 2025-04-04 RX ADMIN — ASPIRIN 81 MG CHEWABLE TABLET 81 MG: 81 TABLET CHEWABLE at 09:18

## 2025-04-04 RX ADMIN — CLOTRIMAZOLE: 1 CREAM TOPICAL at 17:24

## 2025-04-04 RX ADMIN — PREDNISONE 10 MG: 10 TABLET ORAL at 09:18

## 2025-04-04 RX ADMIN — DOCUSATE SODIUM 100 MG: 100 CAPSULE, LIQUID FILLED ORAL at 17:24

## 2025-04-04 RX ADMIN — FLUTICASONE PROPIONATE 2 SPRAY: 50 SPRAY, METERED NASAL at 09:19

## 2025-04-04 RX ADMIN — LEVALBUTEROL HYDROCHLORIDE 1.25 MG: 1.25 SOLUTION RESPIRATORY (INHALATION) at 07:22

## 2025-04-04 NOTE — CASE MANAGEMENT
Case Management Discharge Planning Note    Patient name Natalio Hartmann Jr.  [facility]N [id number]  : 1955 Date 2025       Current Admission Date: 3/14/2025  Current Admission Diagnosis:Acute on chronic respiratory failure with hypoxia and hypercapnia (HCC)   Patient Active Problem List    Diagnosis Date Noted Date Diagnosed    Iron deficiency anemia secondary to inadequate dietary iron intake 2024     SIADH (syndrome of inappropriate ADH production) ([facility]) 2024     Type 2 diabetes mellitus without complication, with long-term current use of insulin ([facility]) 2024     End stage COPD ([facility]) 2024     Dependence on respirator (ventilator) status ([facility]) 01/10/2024     Severe protein-calorie malnutrition ([facility]) 10/14/2023     History of bladder cancer 2023     Pulmonary cachexia due to COPD ([facility])      Chronic hypercapnia/KEVIN 2023     Metabolic encephalopathy 2023     Palliative care by specialist 2023     Alkalosis 2023     Malnutrition ([facility]) 06/10/2023     Hyperlipidemia 2023     COPD exacerbation ([facility]) 2023     Steroid-induced hyperglycemia 2023     Physical deconditioning 2023     Chronic anemia 2023     SIRS (systemic inflammatory response syndrome) 2023     Hyponatremia 2023     Acute on chronic systolic congestive heart failure ([facility]) 2022     Pulmonary nodules/lesions, multiple 2022     Prostate cancer ([facility]) 2021     BPH with obstruction/lower urinary tract symptoms 2021     Goals of care, counseling/discussion 2021     Acute on chronic respiratory failure with hypoxia and hypercapnia ([facility]) 2020     Macrocytosis without anemia 2019     Other insomnia 2019     GERD (gastroesophageal reflux disease) 2017     Nonobstructive atherosclerosis of coronary artery 2016     Mood disorder ([facility]) 2015     Prediabetes 2015      Osteoporosis 10/14/2014     Vitamin D deficiency 10/14/2014     Nonischemic cardiomyopathy (HCC) 09/26/2014     Left bundle-branch block 08/03/2013     Osteoarthritis 08/03/2013     Obstructive sleep apnea 07/29/2013       LOS (days): 21  Geometric Mean LOS (GMLOS) (days): 3.9  Days to GMLOS:-17.1     OBJECTIVE:  Risk of Unplanned Readmission Score: 20.33         Current admission status: Inpatient   Preferred Pharmacy:   [id number] - [place], [place] - [address]  [address]  [place] [address]  Phone: [phone] Fax: [phone]    Primary Care Provider: Fatmata Gustafson DO    Primary Insurance: BLUE CROSS  Secondary Insurance: MEDICARE    DISCHARGE DETAILS:    Discharge planning discussed with:: Patient and patient's spouse, Mellisa  Freedom of Choice: Yes  Comments - Freedom of Choice: P2P review is still pending. Patient has decided that he wants to discharge home with HHC. CM met with patient's spouse at bedside and she is agreeable to this. CM provided choice list to patient's wife and she chose [facility]. [facility] reserved in [place].  CM contacted family/caregiver?: Yes  Were Treatment Team discharge recommendations reviewed with patient/caregiver?: Yes  Did patient/caregiver verbalize understanding of patient care needs?: Yes  Were patient/caregiver advised of the risks associated with not following Treatment Team discharge recommendations?: Yes         Requested Home Health Care         Is the patient interested in HHC at discharge?: Yes  Home Health Discipline requested:: Occupational Therapy, Physical Therapy, Nursing  Home Health Agency Name:: [facility]  Home Health Follow-Up Provider:: PCP  Home Health Services Needed:: Strengthening/Theraputic Exercises to Improve Function, Restore Joint Function Post Joint Replacement, Gait/ADL Training, Heart Failure Management  Homebound Criteria Met:: Uses an Assist Device (i.e. cane, walker, etc)  Supporting Clincal  Findings:: Requires Oxygen, Limited Endurance         Other Referral/Resources/Interventions Provided:  Interventions: [facility]  Referral Comments: [facility] reserved         Treatment Team Recommendation: Acute Rehab  Discharge Destination Plan:: Home with Home Health Care       Provider requested price check for perfomist. CM called patient's pharmacy and was informed that it is $194.08. CE updated provider. CM called patient's wife, Mellisa, to discuss rick. She informed that they are unable to afford that amount. CM updated provider.

## 2025-04-04 NOTE — DISCHARGE SUMMARY
Discharge Summary - Hospitalist   Name: Natalio Hartmann Jr. 69 y.o. male I MRN: [id number]  Unit/Bed#: [facility] 431-01 I Date of Admission: 3/14/2025   Date of Service: 4/4/2025 I Hospital Day: 21     Assessment & Plan  Acute on chronic respiratory failure with hypoxia and hypercapnia (HCC)  History of COPD and CHF, baseline O2 needs of 4 L via nasal cannula  Uses trilogy NIV at night and is on prednisone 10 mg daily chronically  Admitted to ICU for IV diuresis and respiratory support with BiPAP, steroids, nebulizers,  improved and tansferred to floor on 3/22  Most likely due to severe COPD  Pulmonology following  Patient is currently back to his baseline of 4 L nasal cannula  Continue to encourage to work with PT  Initially was agreeable for acute rehab however eventually reported that he wants to go home and does not want to go to acute or short-term rehab anymore.  Wife at bedside supportive of his decision.  Case management to arrange for C.  Case discussed with pulmonology on day of discharge.  Plan to continue with budesonide twice daily, DuoNebs 4 times daily and as needed albuterol in addition to his chronic prednisone 10 mg daily.  Performist is not affordable by the patient and pulmonology is okay with the patient not being discharged with performist,   Also discussed with pulmonology.  They are okay with patient being continued with his Trilogy NIV on discharge.  No need for new BiPAP per pulmonology.  Acute on chronic systolic congestive heart failure (HCC)  Wt Readings from Last 3 Encounters:   04/04/25 43 kg (94 lb 14.4 oz)   02/21/25 44.3 kg (97 lb 11.2 oz)   09/06/24 44.9 kg (99 lb)   Known history of nonischemic cardiomyopathy with LVEF 25%  Presents with worsening shortness of breath volume overload state noted  Now euvolemic.  Transitioned to PO diuretics on 3/25- torsemide 40 mg daily  Also started on losartan 12.5 mg daily  Close follow-up with heart failure  COPD exacerbation (HCC)  Monitored by  pulmonology during hospitalization.   Continue inhaler regimen and prednisone 10 mg daily  SIRS (systemic inflammatory response syndrome) (Resolved: 4/4/2025)  SIRS present on admission, patient initially covered for suspected pneumonia. Symptoms felt more likely to be related to CHF, and abx stopped after 4 days  Blood cultures negative  Monitor off antibiotics  Resolved  Obstructive sleep apnea  BiPAP as needed and at bedtime, patient appears to be tolerating  Metabolic encephalopathy (Resolved: 4/4/2025)  resolved  Secondary to respiratory failure which is multifactorial related to COPD and CHF.  Probable hypoxia and hypercapnia component as well  Status appears to be improving with nocturnal BIPAP  Continue supportive measures and monitor  Severe protein-calorie malnutrition (HCC)  Malnutrition Findings:     Body mass index is 17.36 kg/m².   Encourage adequate protein and calorie intake  Goals of care, counseling/discussion  Patient with multiple comorbidities and overall guarded prognosis.  Still continues Level 1 Full Code at this time as per family wishes  Palliative care team following, involvement appreciated  Hyperlipidemia  Resume Pravachol 80mg po qd  Malnutrition (HCC)  Malnutrition Findings:   Adult Malnutrition type: Chronic illness  Adult Degree of Malnutrition: Other severe protein calorie malnutrition  Malnutrition Characteristics: Fat loss, Muscle loss              360 Statement: severe malnutrition r/t chronic illness as evidenced by severe muscle loss around clavicles and shoulders, moderate-severe muscle loss in temples, severe buccal fat pad loss, apparent depression between ribs suggesting severe fat loss. Treatment: oral diet and oral nutrition supplements  BMI Findings:         Body mass index is 17.36 kg/m².   Palliative care by specialist  Appreciate palliative team input       Nonobstructive CAD.  Started on aspirin 81 mg daily.  Per heart failure can stop pravastatin..     Medical  Problems       Resolved Problems  Date Reviewed: 3/14/2025          Resolved    SIRS (systemic inflammatory response syndrome) 4/4/2025     Resolved by  Remi Rodriguez MD    Overview Deleted 2/17/2023  2:35 PM by Sugey Barker DO          Metabolic encephalopathy 4/4/2025     Resolved by  Remi Rodriguez MD        Discharging Physician / Practitioner: Remi Rodriguez MD  PCP: Fatmata Gustafson DO  Admission Date:   Admission Orders (From admission, onward)       Ordered        03/14/25 1417  INPATIENT ADMISSION  Once                          Discharge Date: 04/04/25    Consultations During Hospital Stay:  Heart failure  Pulmonology  Palliative  PMR    Complications:  none    Reason for Admission: sob    Hospital Course:   Natalio Hartmann Jr. is a 69 y.o. male patient who originally presented to the hospital on 3/14/2025 due to acute respiratory distress.  He was admitted under diagnosis of acute on chronic hypoxic and hypercapnic respiratory failure secondary to COPD and CHF exacerbation requiring ICU admission, IV diuretics, BiPAP, steroid therapy and scheduled nebulized treatments.  He was eventually been able to wean off to his baseline of 4 L nasal cannula and was transitioned to oral diuretics and prednisone with continue scheduled nebulized bronchodilators.  His overall prognosis is poor and he was evaluated by palliative care and had multiple goals of care discussions however patient was not open to hospice and was continued as full code.  His symptoms overall improved and he was able to work with PT OT and was recommended for rehab.  He was initially agreeable to this and decision of acute rehab was pending P2P however patient then changed his mind and reported that he does not want to go to any rehab and wants to go home.  Multiple discussions with the patient and his wife regard to the importance of going to acute rehab versus short-term rehab however patient continued to decline and his wife was supportive of his  decision.  Patient is currently maintained on his baseline of 4 L oxygen and requiring BiPAP at night and intermittently through the day.  He was deemed medically stable for discharge by pulmonology and cardiology.  Per discussion with pulmonology he can be discharged with his Trilogy NIV without need for any new BiPAP.  He is currently stable for discharge home however at high risk of readmission given his severe end-stage COPD and heart failure ejection fraction for which patient and family are aware about this fact.          Please see above list of diagnoses and related plan for additional information.     Condition at Discharge: stable    Discharge Day Visit / Exam:   * Please refer to separate progress note for these details *    Discussion with Family: Updated  (wife) at bedside.    Discharge instructions/Information to patient and family:   See after visit summary for information provided to patient and family.      Provisions for Follow-Up Care:  See after visit summary for information related to follow-up care and any pertinent home health orders.      Mobility at time of Discharge:   Basic Mobility Inpatient Raw Score: 17  JH-HLM Goal: 5: Stand one or more mins  JH-HLM Achieved: 7: Walk 25 feet or more  HLM Goal achieved. Continue to encourage appropriate mobility.     Disposition:   Home with VNA Services (Reminder: Complete face to face encounter)    Planned Readmission: no     Discharge Medications:  See after visit summary for reconciled discharge medications provided to patient and/or family.      Administrative Statements   Discharge Statement:  I have spent a total time of 41 minutes in caring for this patient on the day of the visit/encounter. .    **Please Note: This note may have been constructed using a voice recognition system**

## 2025-04-04 NOTE — ASSESSMENT & PLAN NOTE
Malnutrition Findings:   Adult Malnutrition type: Chronic illness  Adult Degree of Malnutrition: Other severe protein calorie malnutrition  Malnutrition Characteristics: Fat loss, Muscle loss              360 Statement: severe malnutrition r/t chronic illness as evidenced by severe muscle loss around clavicles and shoulders, moderate-severe muscle loss in temples, severe buccal fat pad loss, apparent depression between ribs suggesting severe fat loss. Treatment: oral diet and oral nutrition supplements  BMI Findings:         Body mass index is 17.36 kg/m².

## 2025-04-04 NOTE — ASSESSMENT & PLAN NOTE
Malnutrition Findings:     Body mass index is 17.36 kg/m².   Encourage adequate protein and calorie intake

## 2025-04-04 NOTE — ASSESSMENT & PLAN NOTE
"Palliative Diagnosis: end stage COPD    Goals:   Patient continues to have competing goals.  He does not find his quality of life in hospital to be acceptable, but also, does not wish to engage in any end of life cares or set any limits on cares.  Explicitly declines hospice.  This is similar to previous conversations our team has had during his healthcare journey.  Nevertheless, he was more welcoming of our team than previously and if he continues to allow it I believe he would benefit from ongoing rapport building and Palliative Care support.    Patient remains hopeful to be approved for acute rehab, pending insurance auth for [facility].   Spouse, Adelita, shares patient's goals but is also insightful to poor prognosis and is open to discussing hospice in the future pending course.  See ACP note dated 3/28 regarding family meeting  Palliative will follow for ongoing goals of care discussions as situation evolves.    Social Support:  Patient's support system: spouse, son  Supportive listening provided specifically to spouse, Adelita, at bedside  Normalized experience of patient  Advocated for patient/family with interdisciplinary team      Follow-up  We appreciate the opportunity to participate in this patient's care.   We will continue to follow while admitted.  Patient is eligible for the palliative home program upon discharge  Please do not hesitate to contact our on-call provider through EPIC Secure Chat or contact [phone] should there be an acute change or other symptom control concerns.    Symptom Mgmt:  Patient declines symptomatic management on dyspnea on the basis of  \"morphine killed [his] mother\" and he declines to take \"any of those medications.\"       Please do not make any changes to symptom medications (opioid analgesics, nonopioid analgesics, antiemetics, etc) without first consulting the on-call Palliative Care provider for your specific campus; including after hours and on weekends. We are available " 24/7, and can be contacted on Epic secure chat or at [phone].

## 2025-04-04 NOTE — ASSESSMENT & PLAN NOTE
Continues to have good appetite, picture consistent with pulmonary cachexia    Malnutrition Findings:   Adult Malnutrition type: Chronic illness  Adult Degree of Malnutrition: Other severe protein calorie malnutrition  Malnutrition Characteristics: Fat loss, Muscle loss                  360 Statement: severe malnutrition r/t chronic illness as evidenced by severe muscle loss around clavicles and shoulders, moderate-severe muscle loss in temples, severe buccal fat pad loss, apparent depression between ribs suggesting severe fat loss. Treatment: oral diet and oral nutrition supplements    BMI Findings:           Body mass index is 17.36 kg/m².

## 2025-04-04 NOTE — ASSESSMENT & PLAN NOTE
History of COPD and CHF, baseline O2 needs of 4 L via nasal cannula  Uses trilogy NIV at night and is on prednisone 10 mg daily chronically  Admitted to ICU for IV diuresis and respiratory support with BiPAP, steroids, nebulizers,  improved and tansferred to floor on 3/22  Most likely due to severe COPD  Pulmonology following  Patient is currently back to his baseline of 4 L nasal cannula  Continue to encourage to work with PT  Initially was agreeable for acute rehab however eventually reported that he wants to go home and does not want to go to acute or short-term rehab anymore.  Wife at bedside supportive of his decision.  Case management to arrange for [facility].  Case discussed with pulmonology on day of discharge.  Plan to continue with budesonide twice daily, DuoNebs 4 times daily and as needed albuterol in addition to his chronic prednisone 10 mg daily.  Performist is not affordable by the patient and pulmonology is okay with the patient not being discharged with performist,   Also discussed with pulmonology.  They are okay with patient being continued with his Trilogy NIV on discharge.  No need for new BiPAP per pulmonology.

## 2025-04-04 NOTE — PROGRESS NOTES
Patient:  RUDDY CRUMP    MRN:  [id number]    [place] Request ID:  [id number]    Level of care reserved:  Home Health Agency    Partner Reserved:  [facility], [place] [address] [phone]    Clinical needs requested:    Geography searched:  [phone]    Start of Service:    Request sent:  1:07pm EDT on 4/3/2025 by Lisa Rogers    Partner reserved:  12:29pm EDT on 4/4/2025 by Lisa Rogers    Choice list shared:  12:15pm EDT on 4/4/2025 by Lisa Rogers

## 2025-04-04 NOTE — ASSESSMENT & PLAN NOTE
Wt Readings from Last 3 Encounters:   04/04/25 43 kg (94 lb 14.4 oz)   02/21/25 44.3 kg (97 lb 11.2 oz)   09/06/24 44.9 kg (99 lb)   Known history of nonischemic cardiomyopathy with LVEF 25%  Presents with worsening shortness of breath volume overload state noted  Now euvolemic.  Transitioned to PO diuretics on 3/25- torsemide 40 mg daily  Also started on losartan 12.5 mg daily  Close follow-up with heart failure

## 2025-04-04 NOTE — ASSESSMENT & PLAN NOTE
See ACP note dated 3/28.   Patient is not ready to forego further hospitalizations or medical treatment at this time  His primary goal is to return home, but hopeful to go to [facility] where he is accepted pending insurance auth.  Patient continues to request full code, full cares. If clinical course changes, please engage palliative medicine for assistance.

## 2025-04-04 NOTE — PROGRESS NOTES
"Progress Note - Palliative Care   Name: Natalio Hartmann Jr. 69 y.o. male I MRN: [id number]  Unit/Bed#: [facility] 431-01 I Date of Admission: 3/14/2025   Date of Service: 4/4/2025 I Hospital Day: 21   { ?Quick Links I Problem List I JHONY SIFUENTES Billing Tip:82470}  Assessment & Plan  Palliative care by specialist  Palliative Diagnosis: end stage COPD    Goals:   Patient continues to have competing goals.  He does not find his quality of life in hospital to be acceptable, but also, does not wish to engage in any end of life cares or set any limits on cares.  Explicitly declines hospice.  This is similar to previous conversations our team has had during his healthcare journey.  Nevertheless, he was more welcoming of our team than previously and if he continues to allow it I believe he would benefit from ongoing rapport building and Palliative Care support.    Patient remains hopeful to be approved for acute rehab, pending insurance auth for [facility].   Spouse, Adelita, shares patient's goals but is also insightful to poor prognosis and is open to discussing hospice in the future pending course.  See ACP note dated 3/28 regarding family meeting  Palliative will follow for ongoing goals of care discussions as situation evolves.    Social Support:  Patient's support system: spouse, son  Supportive listening provided specifically to spouse, Adelita, at bedside  Normalized experience of patient  Advocated for patient/family with interdisciplinary team      Follow-up  We appreciate the opportunity to participate in this patient's care.   We will continue to follow while admitted.  Patient is eligible for the palliative home program upon discharge  Please do not hesitate to contact our on-call provider through EPIC Secure Chat or contact [phone] should there be an acute change or other symptom control concerns.    Symptom Mgmt:  Patient declines symptomatic management on dyspnea on the basis of  \"morphine killed [his] mother\" and he declines " "to take \"any of those medications.\"       Please do not make any changes to symptom medications (opioid analgesics, nonopioid analgesics, antiemetics, etc) without first consulting the on-call Palliative Care provider for your specific campus; including after hours and on weekends. We are available 24/7, and can be contacted on Epic secure chat or at [phone].    Acute on chronic respiratory failure with hypoxia and hypercapnia (HCC)  Pulmonary following for end stage COPD  Patient continues to alternate between supplemental O2 and BiPAP, both of which he is accustomed to using at home  Experiencing pulmonary cachexia but has a good appetite  Continue fan for dyspnea  Patient has declined symptomatic treatment of dyspnea with opioids reporting \"morphine killed\" his mother  Goals of care, counseling/discussion  See ACP note dated 3/28.   Patient is not ready to forego further hospitalizations or medical treatment at this time  His primary goal is to return home, but hopeful to go to [facility] where he is accepted pending insurance auth.  Patient continues to request full code, full cares. If clinical course changes, please engage palliative medicine for assistance.   Severe protein-calorie malnutrition (HCC)  Continues to have good appetite, picture consistent with pulmonary cachexia    Malnutrition Findings:   Adult Malnutrition type: Chronic illness  Adult Degree of Malnutrition: Other severe protein calorie malnutrition  Malnutrition Characteristics: Fat loss, Muscle loss                  360 Statement: severe malnutrition r/t chronic illness as evidenced by severe muscle loss around clavicles and shoulders, moderate-severe muscle loss in temples, severe buccal fat pad loss, apparent depression between ribs suggesting severe fat loss. Treatment: oral diet and oral nutrition supplements    BMI Findings:           Body mass index is 17.36 kg/m².     Acute on chronic systolic congestive heart failure (HCC)  Wt Readings " from Last 3 Encounters:   04/04/25 43 kg (94 lb 14.4 oz)   02/21/25 44.3 kg (97 lb 11.2 oz)   09/06/24 44.9 kg (99 lb)     Clear from heart failure perspective for discharge        Obstructive sleep apnea    COPD exacerbation (HCC)      Interval history:       ***    MEDICATIONS / ALLERGIES:     all current active meds have been reviewed    No Known Allergies    OBJECTIVE:    Physical Exam  Physical Exam    Lab Results:   Results from last 7 days   Lab Units 04/01/25  0547 03/31/25  0545 03/30/25  0607   WBC Thousand/uL 5.83 6.17 6.48   HEMOGLOBIN g/dL 10.5* 9.8* 10.1*   HEMATOCRIT % 34.7* 33.5* 32.8*   PLATELETS Thousands/uL 173 173 187   SEGS PCT % 75 77* 75   MONO PCT % 12 10 12   EOS PCT % 1 1 1     Results from last 7 days   Lab Units 04/01/25  0547 03/31/25  0636 03/30/25  0607   POTASSIUM mmol/L 4.4 4.1 4.2   CHLORIDE mmol/L 88* 86* 88*   CO2 mmol/L 43* >45* >45*   BUN mg/dL 37* 30* 27*   CREATININE mg/dL 0.96 0.87 0.76   CALCIUM mg/dL 9.0 9.1 9.0       Imaging Studies: reviewed pertinent studies   EKG, Pathology, and Other Studies: reviewed pertinent studies    Counseling / Coordination of Care    Total floor / unit time spent today *** minutes. Greater than 50% of total time was spent with the patient and / or family counseling and / or coordination of care. A description of the counseling / coordination of care: {LKTOPICSDISCUSSED:87889}

## 2025-04-04 NOTE — QUICK NOTE
4/4/2025 4:24 PM -  Natalio Hartmann Jr.'s chart and case were reviewed by Randa Martinez PA-C.  Mode of review included electronic chart check, and communication with CM.    Awaiting peer to peer. In the interim, patient considering going home due to concern of being denied rehab. CM has initiated planning for home d/c as alternate plan. Should patient be discharged home, will notify [facility] to initiate services.           For urgent issues or any questions/concerns, please notify on-call provider via [employer] Secure Chat.  You may also call our answering service 24/7 at [phone].    Randa Martinez PA-C  Palliative and Supportive Care  Clinic/Answering Service: [phone]  You can find me on [employer]!

## 2025-04-04 NOTE — ASSESSMENT & PLAN NOTE
Wt Readings from Last 3 Encounters:   04/04/25 43 kg (94 lb 14.4 oz)   02/21/25 44.3 kg (97 lb 11.2 oz)   09/06/24 44.9 kg (99 lb)     Clear from heart failure perspective for discharge

## 2025-04-04 NOTE — ASSESSMENT & PLAN NOTE
Monitored by pulmonology during hospitalization.   Continue inhaler regimen and prednisone 10 mg daily

## 2025-04-06 ENCOUNTER — HOME CARE VISIT (OUTPATIENT)
Dept: HOME HEALTH SERVICES | Facility: HOME HEALTHCARE | Age: 70
End: 2025-04-06

## 2025-04-06 NOTE — Clinical Note
SN arrived at home of pt. Sat down at kitchen table with pt and his wife, and while going over the home health handbook, pt stated he did not want anyone to help him. He stated he didn't need help, he gets around just fine, and takes his medications so he doesn't need anyone to come help him. Asked wife if she is agreeable to pt's decision and she said yes she is.

## 2025-04-07 ENCOUNTER — TELEPHONE (OUTPATIENT)
Dept: PALLIATIVE MEDICINE | Facility: CLINIC | Age: 70
End: 2025-04-07

## 2025-04-07 ENCOUNTER — TRANSITIONAL CARE MANAGEMENT (OUTPATIENT)
Age: 70
End: 2025-04-07

## 2025-04-07 NOTE — UTILIZATION REVIEW
NOTIFICATION OF ADMISSION DISCHARGE   This is a Notification of Discharge from [facility]. Please be advised that this patient has been discharge from our facility. Below you will find the admission and discharge date and time including the patient’s disposition.   UTILIZATION REVIEW CONTACT:  Utilization Review Assistants  Network Utilization Review Department  Phone: [phone] x carefully listen to the prompts. All voicemails are confidential.  Email: [email or web address]     ADMISSION INFORMATION  PRESENTATION DATE: 3/14/2025 10:00 AM  OBERVATION ADMISSION DATE: N/A  INPATIENT ADMISSION DATE: 3/14/25  2:17 PM   DISCHARGE DATE: 4/4/2025  7:42 PM   DISPOSITION:Home with Home Health Care    [facility] Utilization Review Department  ATTENTION: Please call with any questions or concerns to [phone] and carefully listen to the prompts so that you are directed to the right person. All voicemails are confidential.   For Discharge needs, contact Care Management DC Support Team at [phone] opt. 2  Send all requests for admission clinical reviews, approved or denied determinations and any other requests to dedicated fax number below belonging to the campus where the patient is receiving treatment. List of dedicated fax numbers for the Facilities:  FACILITY NAME UR FAX NUMBER   ADMISSION DENIALS (Administrative/Medical Necessity) [phone]   DISCHARGE SUPPORT TEAM ([facility]) [phone]   PARENT CHILD HEALTH (Maternity/NICU/Pediatrics) [phone]   [facility] [phone]   [facility] [phone]   [facility] [phone]   [facility] [phone]   [facility] [phone]   [facility] [phone]   [facility] [phone]   [facility] [phone]   [facility]  [facility] [phone]   [facility] [phone]   [facility] [phone]   [facility] [phone]

## 2025-04-07 NOTE — TELEPHONE ENCOUNTER
1st call.   Left a message for patient to call office back to schedule an appointment with Palliative Care.

## 2025-04-09 ENCOUNTER — PATIENT OUTREACH (OUTPATIENT)
Dept: CARDIOLOGY CLINIC | Facility: CLINIC | Age: 70
End: 2025-04-09

## 2025-04-10 ENCOUNTER — TELEPHONE (OUTPATIENT)
Age: 70
End: 2025-04-10

## 2025-04-10 NOTE — TELEPHONE ENCOUNTER
Left voicemail for patient's spouse, Mellisa introducing [facility]. Provided contact information.

## 2025-04-11 ENCOUNTER — OFFICE VISIT (OUTPATIENT)
Age: 70
End: 2025-04-11

## 2025-04-11 VITALS
RESPIRATION RATE: 19 BRPM | BODY MASS INDEX: 17.36 KG/M2 | SYSTOLIC BLOOD PRESSURE: 92 MMHG | TEMPERATURE: 96 F | DIASTOLIC BLOOD PRESSURE: 60 MMHG | HEIGHT: 62 IN | HEART RATE: 84 BPM

## 2025-04-11 DIAGNOSIS — G47.33 OBSTRUCTIVE SLEEP APNEA: ICD-10-CM

## 2025-04-11 DIAGNOSIS — I42.8 NONISCHEMIC CARDIOMYOPATHY (HCC): Primary | ICD-10-CM

## 2025-04-11 DIAGNOSIS — J44.9 END STAGE COPD (HCC): ICD-10-CM

## 2025-04-11 DIAGNOSIS — Z79.4 TYPE 2 DIABETES MELLITUS WITHOUT COMPLICATION, WITH LONG-TERM CURRENT USE OF INSULIN (HCC): ICD-10-CM

## 2025-04-11 DIAGNOSIS — E11.9 TYPE 2 DIABETES MELLITUS WITHOUT COMPLICATION, WITH LONG-TERM CURRENT USE OF INSULIN (HCC): ICD-10-CM

## 2025-04-11 DIAGNOSIS — Z71.89 GOALS OF CARE, COUNSELING/DISCUSSION: ICD-10-CM

## 2025-04-11 PROBLEM — T38.0X5A STEROID-INDUCED HYPERGLYCEMIA: Status: RESOLVED | Noted: 2023-03-30 | Resolved: 2025-04-11

## 2025-04-11 PROBLEM — R73.9 STEROID-INDUCED HYPERGLYCEMIA: Status: RESOLVED | Noted: 2023-03-30 | Resolved: 2025-04-11

## 2025-04-11 PROBLEM — J44.1 COPD EXACERBATION (HCC): Status: RESOLVED | Noted: 2023-04-28 | Resolved: 2025-04-11

## 2025-04-11 PROBLEM — Z99.11 DEPENDENCE ON RESPIRATOR (VENTILATOR) STATUS (HCC): Status: RESOLVED | Noted: 2024-01-10 | Resolved: 2025-04-11

## 2025-04-11 PROBLEM — D75.89 MACROCYTOSIS WITHOUT ANEMIA: Status: RESOLVED | Noted: 2019-05-23 | Resolved: 2025-04-11

## 2025-04-11 NOTE — PROGRESS NOTES
Transition of Care Visit:  Name: Natalio Hartmann Jr.      : 1955      MRN: [id number]  Encounter Provider: Fatmata Gustafson DO  Encounter Date: 2025   Encounter department: [facility]    Assessment & Plan  Nonischemic cardiomyopathy (HCC)  -LVEF 25%  -continue demadex 40mg daily and low dose ACEI  -follow up with Cardio       End stage COPD (HCC)  -multiple exacerbations/hospitalizations  -on chronic prednisone 10mg daily, 4L 02, budesonide twice daily, duonebs QID   -follow up with Pulm       Obstructive sleep apnea  -compliant with bipap qhs       Type 2 diabetes mellitus without complication, with long-term current use of insulin (HCC)  -DM2, steroid induced  Lab Results   Component Value Date    HGBA1C 6.1 (H) 2025   -A1c is controlled  -on low dose ACEI for CHF  -refuses foot exam         Goals of care, counseling/discussion  -end stage COPD and CHF with poor/guarded prognosis  -he remains full code per his wishes            History of Present Illness     Transitional Care Management Review:   Natalio Hartmann Jr. is a 69 y.o. male here for TCM follow up.     During the TCM phone call patient stated:  TCM Call (since 3/28/2025)     Date and time call was made  2025  7:27 AM    Date of Admission  25    Date of discharge  25    Diagnosis  Acute on chronic respiratory failure with hypoxia and hypercapnia (HCC)    Disposition  Home health services; Home      TCM Call (since 3/28/2025)     Scheduled for follow up?  Yes    I have advised the patient to call PCP with any new or worsening symptoms  Lamont Avilez m.a        HPI  He is accompanied by his wife and his son.    He was hospitalized at 3/14-25 for acute respiratory distress secondary to COPD and CHF exacerbations.  He required ICU care, received diuretics, bipap, nebs and steroid therapy.   His was weaned down to his baseline regimen of prednisone, budesonide, duonebs and bipap use.  On  "chronic 4L oxygen.  He was started on low dose ACEI.  BP soft but denies dizziness.  Gets HA.  Continues with productive cough, SOB.  He declined HHA.  He remains on full code per his wishes.    Tries to eat, takes in Ensure 2-3 cans/d.    Review of Systems   Constitutional:  Positive for activity change and appetite change. Negative for fever.   Respiratory:  Positive for cough (productive) and shortness of breath. Negative for wheezing.    Cardiovascular:  Positive for leg swelling. Negative for chest pain and palpitations.   Gastrointestinal:  Negative for abdominal pain, constipation, diarrhea, nausea and vomiting.   Genitourinary:  Positive for frequency.   Neurological:  Positive for headaches. Negative for dizziness.     Objective   BP 92/60 (BP Location: Left arm, Patient Position: Sitting, Cuff Size: Standard)   Pulse 84   Temp (!) 96 °F (35.6 °C) (Tympanic Core)   Resp 19   Ht 5' 2\" (1.575 m)   BMI 17.36 kg/m²     Physical Exam  Vitals reviewed.   Constitutional:       Appearance: He is not ill-appearing.   HENT:      Nose:      Comments: Wears nasal cannula     Mouth/Throat:      Mouth: Mucous membranes are dry.   Cardiovascular:      Rate and Rhythm: Normal rate. Occasional Extrasystoles are present.     Pulses: Normal pulses.      Heart sounds: Normal heart sounds.   Pulmonary:      Breath sounds: No wheezing.      Comments: Tachypneic  Decreased BS  Musculoskeletal:      Right lower leg: Edema (trace) present.      Left lower leg: Edema (trace) present.   Neurological:      Mental Status: He is alert and oriented to person, place, and time.   Psychiatric:         Attention and Perception: Attention normal.         Behavior: Behavior is agitated.         Medications have been reviewed by provider in current encounter      "

## 2025-04-11 NOTE — ASSESSMENT & PLAN NOTE
-DM2, steroid induced  Lab Results   Component Value Date    HGBA1C 6.1 (H) 03/14/2025   -A1c is controlled  -on low dose ACEI for CHF  -refuses foot exam

## 2025-04-11 NOTE — ASSESSMENT & PLAN NOTE
-multiple exacerbations/hospitalizations  -on chronic prednisone 10mg daily, 4L 02, budesonide twice daily, duonebs QID   -follow up with Pulm

## 2025-04-22 ENCOUNTER — OFFICE VISIT (OUTPATIENT)
Dept: PULMONOLOGY | Facility: CLINIC | Age: 70
End: 2025-04-22
Payer: COMMERCIAL

## 2025-04-22 VITALS
HEART RATE: 91 BPM | TEMPERATURE: 97.2 F | OXYGEN SATURATION: 97 % | SYSTOLIC BLOOD PRESSURE: 102 MMHG | DIASTOLIC BLOOD PRESSURE: 70 MMHG

## 2025-04-22 DIAGNOSIS — Z71.89 GOALS OF CARE, COUNSELING/DISCUSSION: ICD-10-CM

## 2025-04-22 DIAGNOSIS — J44.9 PULMONARY CACHEXIA DUE TO COPD (HCC): ICD-10-CM

## 2025-04-22 DIAGNOSIS — R64 PULMONARY CACHEXIA DUE TO COPD (HCC): ICD-10-CM

## 2025-04-22 DIAGNOSIS — J96.22 ACUTE ON CHRONIC RESPIRATORY FAILURE WITH HYPOXIA AND HYPERCAPNIA (HCC): Primary | ICD-10-CM

## 2025-04-22 DIAGNOSIS — J44.9 END STAGE COPD (HCC): ICD-10-CM

## 2025-04-22 DIAGNOSIS — J96.21 ACUTE ON CHRONIC RESPIRATORY FAILURE WITH HYPOXIA AND HYPERCAPNIA (HCC): Primary | ICD-10-CM

## 2025-04-22 DIAGNOSIS — R91.8 PULMONARY NODULES/LESIONS, MULTIPLE: ICD-10-CM

## 2025-04-22 DIAGNOSIS — G47.33 OBSTRUCTIVE SLEEP APNEA: ICD-10-CM

## 2025-04-22 PROCEDURE — 99497 ADVNCD CARE PLAN 30 MIN: CPT | Performed by: INTERNAL MEDICINE

## 2025-04-22 RX ORDER — ALBUTEROL SULFATE 90 UG/1
2 INHALANT RESPIRATORY (INHALATION) EVERY 6 HOURS PRN
Qty: 18 G | Refills: 11 | Status: SHIPPED | OUTPATIENT
Start: 2025-04-22

## 2025-04-23 ENCOUNTER — TELEPHONE (OUTPATIENT)
Dept: OTHER | Facility: HOSPITAL | Age: 70
End: 2025-04-23

## 2025-04-23 ENCOUNTER — TELEPHONE (OUTPATIENT)
Age: 70
End: 2025-04-23

## 2025-04-23 NOTE — TELEPHONE ENCOUNTER
Spoke with patient wife: She just wanted to update you that unfortunately patient is now losing control of both bowel and bladder requiring briefs    Also she stated that they were giving her  some sort of cream or moisturizer for his legs when they get swollen just wondering if you had any recommendations I do not see anything in the hospital notes for his med list

## 2025-04-23 NOTE — TELEPHONE ENCOUNTER
Pts wife requesting a call back from Dr. Luque / she has some questions she forgot to ask at the pts visit yesterday    She declined speaking with a nurse/ and did not want to say what exact questions were/ stating Dr. Luque knows  case.

## 2025-04-23 NOTE — TELEPHONE ENCOUNTER
Spoke with Dr. Luque--recommend follow-up with PCP given loss of bowel and bladder--unclear on cream that was used on his lower extremities also recommended PCP could be evaluated until they are in any lotion would likely be beneficial

## 2025-04-26 NOTE — ASSESSMENT & PLAN NOTE
"We spoke at length about his condition and that he is not going to improve from a respiratory standpoint.  He repeatedly has conflicting thoughts:   He wants to be left alone at home, and have his family not call 911 or go back to the hospital  He wants everything to be done including full code as he continues to survive his hospital stays  \"I dont want to see any more Doctors\"    When we discuss his quality of life - he states that as long as he can be at home, \"independent\" and talking with his family that is enough for him.     He continues to refuse help at home. I tried to convince him to enroll in the palliative care/hospice at home program - he is not interested in anyone coming to help him at home   "

## 2025-04-26 NOTE — ASSESSMENT & PLAN NOTE
Unfortunately he continues to have significant debilitation in regards to his breathing  This is compounded by his heart failure    He will maintain on all nebulized regimen  He needed a refill for albuterol MDI    Unfortunately he is too debilitated to get to pulmonary rehab on a regular basis and he doesn't want others to come into his house for help with nursing/therapy

## 2025-04-26 NOTE — PROGRESS NOTES
"Assessment:    End stage COPD (HCC)  Unfortunately he continues to have significant debilitation in regards to his breathing  This is compounded by his heart failure    He will maintain on all nebulized regimen  He needed a refill for albuterol MDI    Unfortunately he is too debilitated to get to pulmonary rehab on a regular basis and he doesn't want others to come into his house for help with nursing/therapy    Obstructive sleep apnea  On trilogy vent at night with supplemental oxygen 4 L    Acute on chronic respiratory failure with hypoxia and hypercapnia (HCC)  Maintain on supplemental oxygen during the day with Trilogy vent at night    Goals of care, counseling/discussion  We spoke at length about his condition and that he is not going to improve from a respiratory standpoint.  He repeatedly has conflicting thoughts:   He wants to be left alone at home, and have his family not call 911 or go back to the hospital  He wants everything to be done including full code as he continues to survive his hospital stays  \"I dont want to see any more Doctors\"    When we discuss his quality of life - he states that as long as he can be at home, \"independent\" and talking with his family that is enough for him.     He continues to refuse help at home. I tried to convince him to enroll in the palliative care/hospice at home program - he is not interested in anyone coming to help him at home     We spent 40 minutes discussing end of life/goals    Plan:    Diagnoses and all orders for this visit:    Acute on chronic respiratory failure with hypoxia and hypercapnia (HCC)    End stage COPD (HCC)  -     albuterol (PROVENTIL HFA,VENTOLIN HFA) 90 mcg/act inhaler; Inhale 2 puffs every 6 (six) hours as needed for wheezing or shortness of breath    Pulmonary cachexia due to COPD (HCC)    Pulmonary nodules/lesions, multiple    Obstructive sleep apnea    Goals of care, counseling/discussion        Follow-up: 6-8 weeks       HPI:  He was " hospitalized again last month  He had developed respiratory failure/required bipap secondary to profound volume overload/COPD  He has had progressive issues with his vision and became more debilitated but didn't qualify for acute rehab so is now at home  Unfortunately he has refused home health aides/nurses or rehab  His family is taking care of him    He is back to his home nebulizer regimen  On 4-5 L oxygen  Using trilogy vent at night      Review of Systems   Constitutional:  Positive for activity change. Negative for fever.   Respiratory:  Positive for cough, shortness of breath and wheezing.    Cardiovascular:  Positive for leg swelling.   Skin:  Negative for rash.   Psychiatric/Behavioral:  Positive for sleep disturbance.        The following portions of the patient's history were reviewed and updated as appropriate: allergies, current medications, past family history, past medical history, past social history, past surgical history, and problem list.    VITALS:  Vitals:    04/22/25 1313   BP: 102/70   BP Location: Left arm   Patient Position: Sitting   Cuff Size: Standard   Pulse: 91   Temp: (!) 97.2 °F (36.2 °C)   TempSrc: Temporal   SpO2: 97%       Physical Exam  General:  Patient is awake, chronically ill appearing but in no acute respiratory distress  CV:  Regular, +S1 and S2, No murmurs, gallops or rubs appreciated  Lungs: Diminished breath sounds throughout with expiratory wheeze  Abdomen: Soft, +BS, Non-tender, non-distended  Extremities: B/L LE foot edema  Neuro: No focal deficits        Diagnostic Testing:        CBC:  Lab Results   Component Value Date    WBC 5.83 04/01/2025    HGB 10.5 (L) 04/01/2025    HCT 34.7 (L) 04/01/2025     (H) 04/01/2025     04/01/2025         BMP:   Lab Results   Component Value Date     08/17/2015    K 4.4 04/01/2025    CL 88 (L) 04/01/2025    CO2 43 (H) 04/01/2025    ANIONGAP 6 08/17/2015    BUN 37 (H) 04/01/2025    CREATININE 0.96 04/01/2025     GLUCOSE 163 (H) 10/23/2024    GLUF 124 (H) 03/30/2023    CALCIUM 9.0 04/01/2025    CORRECTEDCA 8.7 03/18/2025    AST 23 03/24/2025    ALT 22 03/24/2025    ALKPHOS 40 03/24/2025    PROT 6.8 08/17/2015    BILITOT 0.60 08/17/2015    EGFR 81 04/01/2025         Malia Luque DO

## 2025-04-27 DIAGNOSIS — I50.23 ACUTE ON CHRONIC SYSTOLIC CONGESTIVE HEART FAILURE (HCC): ICD-10-CM

## 2025-04-27 DIAGNOSIS — I25.10 CAD (CORONARY ARTERY DISEASE): ICD-10-CM

## 2025-04-28 RX ORDER — ASPIRIN 81 MG
81 TABLET,CHEWABLE ORAL DAILY
Qty: 90 TABLET | Refills: 1 | Status: SHIPPED | OUTPATIENT
Start: 2025-04-28

## 2025-04-28 RX ORDER — LOSARTAN POTASSIUM 25 MG/1
12.5 TABLET ORAL DAILY
Qty: 45 TABLET | Refills: 1 | Status: SHIPPED | OUTPATIENT
Start: 2025-04-28

## 2025-04-28 NOTE — TELEPHONE ENCOUNTER
Requested medication(s) are due for refill today: Yes  Patient has already received a courtesy refill: No  Other reason request has been forwarded to provider:   
Anxiety

## 2025-05-09 ENCOUNTER — NURSE TRIAGE (OUTPATIENT)
Age: 70
End: 2025-05-09

## 2025-05-09 NOTE — TELEPHONE ENCOUNTER
Advised Wife. Verbally understood.she will continue to monitor wt and BP. She will let us know if he needs anything else.

## 2025-05-09 NOTE — TELEPHONE ENCOUNTER
"FOLLOW UP: please call pt/wife back w/ recommendations    REASON FOR CONVERSATION: Foot Swelling    SYMPTOMS: wife states they noticed Wednesday pt had some swelling bilateral feet, it improved w/ elevation and was better Thursday am but then returned. This am feet remain swollen, he also has some swelling L ankle. There is redness on top of feet.  He denies sob.      OTHER: His wt is down to 92.4 lbs.  Pulse ox readings 94-97%, P 87-96.  There BP monitor is broken.       DISPOSITION: Discuss with Provider and Call Back Patient      Answer Assessment - Initial Assessment Questions  1. ONSET: \"When did the swelling start?\" (e.g., minutes, hours, days)      Noticed Wednesday but improved w/ elevation but returned yesterday   2. LOCATION: \"What part of the leg is swollen?\"  \"Are both legs swollen or just one leg?\"      Feet L >R, some swelling L ankle  3. SEVERITY: \"How bad is the swelling?\" (e.g., localized; mild, moderate, severe)      Severe to wife  4. REDNESS: \"Does the swelling look red or infected?\"      Feet appear red on top, they are warm to touch but not hot  5. PAIN: \"Is the swelling painful to touch?\" If Yes, ask: \"How painful is it?\"   (Scale 1-10; mild, moderate or severe)      No pain  6. FEVER: \"Do you have a fever?\" If Yes, ask: \"What is it, how was it measured, and when did it start?\"       no  7. CAUSE: \"What do you think is causing the leg swelling?\"      CHF  8. MEDICAL HISTORY: \"Do you have a history of blood clots (e.g., DVT), cancer, heart failure, kidney disease, or liver failure?\"      Heart failure, denies kidney or liver disease, no hx DVT, bladder cancer many years ago  9. RECURRENT SYMPTOM: \"Have you had leg swelling before?\" If Yes, ask: \"When was the last time?\" \"What happened that time?\"      Yes, has been in and out of hospital  10. OTHER SYMPTOMS: \"Do you have any other symptoms?\" (e.g., chest pain, difficulty breathing)        No chest pain or sob    Protocols used: Leg Swelling and " Edema-Adult-[place]

## 2025-05-13 ENCOUNTER — APPOINTMENT (EMERGENCY)
Dept: RADIOLOGY | Facility: HOSPITAL | Age: 70
End: 2025-05-13
Payer: COMMERCIAL

## 2025-05-13 ENCOUNTER — HOSPITAL ENCOUNTER (OUTPATIENT)
Facility: HOSPITAL | Age: 70
Setting detail: OBSERVATION
Discharge: HOME | End: 2025-05-14
Attending: EMERGENCY MEDICINE | Admitting: STUDENT IN AN ORGANIZED HEALTH CARE EDUCATION/TRAINING PROGRAM
Payer: COMMERCIAL

## 2025-05-13 DIAGNOSIS — J44.9 COPD (CHRONIC OBSTRUCTIVE PULMONARY DISEASE) (HCC): ICD-10-CM

## 2025-05-13 DIAGNOSIS — I50.9 CHF EXACERBATION (HCC): Primary | ICD-10-CM

## 2025-05-13 LAB
2HR DELTA HS TROPONIN: 1 NG/L
4HR DELTA HS TROPONIN: 7 NG/L
ALBUMIN SERPL BCG-MCNC: 3.9 G/DL (ref 3.5–5)
ALP SERPL-CCNC: 55 U/L (ref 34–104)
ALT SERPL W P-5'-P-CCNC: 10 U/L (ref 7–52)
AST SERPL W P-5'-P-CCNC: 18 U/L (ref 13–39)
BASOPHILS # BLD AUTO: 0.02 THOUSANDS/ÂΜL (ref 0–0.1)
BASOPHILS NFR BLD AUTO: 0 % (ref 0–1)
BILIRUB SERPL-MCNC: 0.45 MG/DL (ref 0.2–1)
BNP SERPL-MCNC: 295 PG/ML (ref 0–100)
BUN SERPL-MCNC: 26 MG/DL (ref 5–25)
CALCIUM SERPL-MCNC: 8.9 MG/DL (ref 8.4–10.2)
CARDIAC TROPONIN I PNL SERPL HS: 30 NG/L (ref ?–50)
CARDIAC TROPONIN I PNL SERPL HS: 31 NG/L (ref ?–50)
CARDIAC TROPONIN I PNL SERPL HS: 37 NG/L (ref ?–50)
CHLORIDE SERPL-SCNC: 84 MMOL/L (ref 96–108)
CO2 SERPL-SCNC: >45 MMOL/L (ref 21–32)
CREAT SERPL-MCNC: 0.83 MG/DL (ref 0.6–1.3)
EOSINOPHIL # BLD AUTO: 0.02 THOUSAND/ÂΜL (ref 0–0.61)
EOSINOPHIL NFR BLD AUTO: 0 % (ref 0–6)
ERYTHROCYTE [DISTWIDTH] IN BLOOD BY AUTOMATED COUNT: 12.6 % (ref 11.6–15.1)
GFR SERPL CREATININE-BSD FRML MDRD: 90 ML/MIN/1.73SQ M
GLUCOSE SERPL-MCNC: 110 MG/DL (ref 65–140)
GLUCOSE SERPL-MCNC: 156 MG/DL (ref 65–140)
HCT VFR BLD AUTO: 34.4 % (ref 36.5–49.3)
HGB BLD-MCNC: 10.2 G/DL (ref 12–17)
IMM GRANULOCYTES # BLD AUTO: 0.04 THOUSAND/UL (ref 0–0.2)
IMM GRANULOCYTES NFR BLD AUTO: 0 % (ref 0–2)
LYMPHOCYTES # BLD AUTO: 0.24 THOUSANDS/ÂΜL (ref 0.6–4.47)
LYMPHOCYTES NFR BLD AUTO: 2 % (ref 14–44)
MCH RBC QN AUTO: 30.5 PG (ref 26.8–34.3)
MCHC RBC AUTO-ENTMCNC: 29.7 G/DL (ref 31.4–37.4)
MCV RBC AUTO: 103 FL (ref 82–98)
MONOCYTES # BLD AUTO: 0.58 THOUSAND/ÂΜL (ref 0.17–1.22)
MONOCYTES NFR BLD AUTO: 5 % (ref 4–12)
NEUTROPHILS # BLD AUTO: 10.28 THOUSANDS/ÂΜL (ref 1.85–7.62)
NEUTS SEG NFR BLD AUTO: 93 % (ref 43–75)
NRBC BLD AUTO-RTO: 0 /100 WBCS
PLATELET # BLD AUTO: 265 THOUSANDS/UL (ref 149–390)
PMV BLD AUTO: 9.5 FL (ref 8.9–12.7)
POTASSIUM SERPL-SCNC: 3.8 MMOL/L (ref 3.5–5.3)
PROCALCITONIN SERPL-MCNC: 0.07 NG/ML
PROT SERPL-MCNC: 6.3 G/DL (ref 6.4–8.4)
RBC # BLD AUTO: 3.34 MILLION/UL (ref 3.88–5.62)
SODIUM SERPL-SCNC: 137 MMOL/L (ref 135–147)
WBC # BLD AUTO: 11.18 THOUSAND/UL (ref 4.31–10.16)

## 2025-05-13 PROCEDURE — 99285 EMERGENCY DEPT VISIT HI MDM: CPT | Performed by: EMERGENCY MEDICINE

## 2025-05-13 PROCEDURE — 94760 N-INVAS EAR/PLS OXIMETRY 1: CPT

## 2025-05-13 PROCEDURE — 71046 X-RAY EXAM CHEST 2 VIEWS: CPT

## 2025-05-13 PROCEDURE — 82948 REAGENT STRIP/BLOOD GLUCOSE: CPT

## 2025-05-13 PROCEDURE — 94640 AIRWAY INHALATION TREATMENT: CPT

## 2025-05-13 PROCEDURE — 85025 COMPLETE CBC W/AUTO DIFF WBC: CPT

## 2025-05-13 PROCEDURE — 94660 CPAP INITIATION&MGMT: CPT

## 2025-05-13 PROCEDURE — 94664 DEMO&/EVAL PT USE INHALER: CPT

## 2025-05-13 PROCEDURE — 99285 EMERGENCY DEPT VISIT HI MDM: CPT

## 2025-05-13 PROCEDURE — 84145 PROCALCITONIN (PCT): CPT | Performed by: STUDENT IN AN ORGANIZED HEALTH CARE EDUCATION/TRAINING PROGRAM

## 2025-05-13 PROCEDURE — 99223 1ST HOSP IP/OBS HIGH 75: CPT | Performed by: STUDENT IN AN ORGANIZED HEALTH CARE EDUCATION/TRAINING PROGRAM

## 2025-05-13 PROCEDURE — 84484 ASSAY OF TROPONIN QUANT: CPT

## 2025-05-13 PROCEDURE — 80053 COMPREHEN METABOLIC PANEL: CPT

## 2025-05-13 PROCEDURE — 96374 THER/PROPH/DIAG INJ IV PUSH: CPT

## 2025-05-13 PROCEDURE — 83880 ASSAY OF NATRIURETIC PEPTIDE: CPT

## 2025-05-13 PROCEDURE — 36415 COLL VENOUS BLD VENIPUNCTURE: CPT

## 2025-05-13 RX ORDER — HEPARIN SODIUM 5000 [USP'U]/ML
5000 INJECTION, SOLUTION INTRAVENOUS; SUBCUTANEOUS EVERY 8 HOURS SCHEDULED
Status: DISCONTINUED | OUTPATIENT
Start: 2025-05-13 | End: 2025-05-14 | Stop reason: HOSPADM

## 2025-05-13 RX ORDER — LOSARTAN POTASSIUM 25 MG/1
12.5 TABLET ORAL DAILY
Status: DISCONTINUED | OUTPATIENT
Start: 2025-05-14 | End: 2025-05-14 | Stop reason: HOSPADM

## 2025-05-13 RX ORDER — PREDNISONE 10 MG/1
10 TABLET ORAL DAILY
Status: DISCONTINUED | OUTPATIENT
Start: 2025-05-14 | End: 2025-05-14 | Stop reason: HOSPADM

## 2025-05-13 RX ORDER — INSULIN LISPRO 100 [IU]/ML
1-5 INJECTION, SOLUTION INTRAVENOUS; SUBCUTANEOUS
Status: DISCONTINUED | OUTPATIENT
Start: 2025-05-13 | End: 2025-05-14 | Stop reason: HOSPADM

## 2025-05-13 RX ORDER — LEVALBUTEROL INHALATION SOLUTION 1.25 MG/3ML
1.25 SOLUTION RESPIRATORY (INHALATION)
Status: DISCONTINUED | OUTPATIENT
Start: 2025-05-13 | End: 2025-05-14 | Stop reason: HOSPADM

## 2025-05-13 RX ORDER — ASPIRIN 81 MG/1
81 TABLET, CHEWABLE ORAL DAILY
Status: DISCONTINUED | OUTPATIENT
Start: 2025-05-14 | End: 2025-05-14 | Stop reason: HOSPADM

## 2025-05-13 RX ORDER — FUROSEMIDE 10 MG/ML
60 INJECTION INTRAMUSCULAR; INTRAVENOUS ONCE
Status: COMPLETED | OUTPATIENT
Start: 2025-05-13 | End: 2025-05-13

## 2025-05-13 RX ORDER — TORSEMIDE 20 MG/1
40 TABLET ORAL DAILY
Status: DISCONTINUED | OUTPATIENT
Start: 2025-05-14 | End: 2025-05-13

## 2025-05-13 RX ORDER — FUROSEMIDE 10 MG/ML
40 INJECTION INTRAMUSCULAR; INTRAVENOUS
Status: DISCONTINUED | OUTPATIENT
Start: 2025-05-14 | End: 2025-05-14

## 2025-05-13 RX ORDER — BUDESONIDE 0.5 MG/2ML
0.5 INHALANT ORAL
Status: DISCONTINUED | OUTPATIENT
Start: 2025-05-13 | End: 2025-05-14 | Stop reason: HOSPADM

## 2025-05-13 RX ORDER — ALBUTEROL SULFATE 90 UG/1
2 INHALANT RESPIRATORY (INHALATION) EVERY 6 HOURS PRN
Status: DISCONTINUED | OUTPATIENT
Start: 2025-05-13 | End: 2025-05-14 | Stop reason: HOSPADM

## 2025-05-13 RX ORDER — IPRATROPIUM BROMIDE AND ALBUTEROL SULFATE 2.5; .5 MG/3ML; MG/3ML
3 SOLUTION RESPIRATORY (INHALATION) 4 TIMES DAILY
Status: DISCONTINUED | OUTPATIENT
Start: 2025-05-13 | End: 2025-05-13

## 2025-05-13 RX ADMIN — BUDESONIDE 0.5 MG: 0.5 INHALANT RESPIRATORY (INHALATION) at 20:29

## 2025-05-13 RX ADMIN — IPRATROPIUM BROMIDE 0.5 MG: 0.5 SOLUTION RESPIRATORY (INHALATION) at 20:29

## 2025-05-13 RX ADMIN — FUROSEMIDE 60 MG: 10 INJECTION, SOLUTION INTRAMUSCULAR; INTRAVENOUS at 13:23

## 2025-05-13 RX ADMIN — LEVALBUTEROL HYDROCHLORIDE 1.25 MG: 1.25 SOLUTION RESPIRATORY (INHALATION) at 20:29

## 2025-05-13 RX ADMIN — HEPARIN SODIUM 5000 UNITS: 5000 INJECTION INTRAVENOUS; SUBCUTANEOUS at 22:26

## 2025-05-13 NOTE — ED ATTENDING ATTESTATION
I saw and evaluated the patient. I have discussed the patient with the resident physician and agree with the resident's findings, assessment and plan as documented in the resident physician's note, unless otherwise documented below. All available laboratory and imaging studies were reviewed by myself.  I was present for key portions of any procedure(s) performed by the resident and I was immediately available to provide assistance.     I agree with the current assessment done in the Emergency Department. I have conducted an independent evaluation of this patient    Final Diagnosis:  1. CHF exacerbation ([facility])    2. COPD (chronic obstructive pulmonary disease) ([facility])            Chief Complaint   Patient presents with    Leg Swelling     For a couple days due to CHF     This is a 70 y.o. male with a history of CHF, COPD on 4L O2 via NC at baseline, presenting for evaluation of bilateral lower extremity swelling. Says swelling has been worsening x 1 week. Recently increased Lasix from 40 mg to 60 mg qd. Feels like abdomen is slightly distended as well but not having abdominal pain. Has chronic SOB but says he is at his baseline. Denies fever, chills, cough, chest pain,  n/v/d, headache, any other complaints.     PMH:   has a past medical history of Acute metabolic encephalopathy (03/29/2022), Arthritis, Bladder mass, Cardiomyopathy ([facility]), Chest pain, COPD (chronic obstructive pulmonary disease) ([facility]), COPD exacerbation ([facility]) (04/28/2023), COVID-19 virus infection (02/23/2023), CPAP (continuous positive airway pressure) dependence, Dependence on respirator (ventilator) status ([facility]) (01/10/2024), Emphysema of lung ([facility]), Hypoxia, Left bundle branch block, Macrocytosis without anemia (05/23/2019), Multiple pulmonary nodules, Osteoarthritis (08/03/2013), Pneumonia, Sleep apnea, obstructive, Smoker, Steroid-induced hyperglycemia (03/30/2023), and Weight loss (08/29/2019).    PSH:   has a past surgical history that includes pr  cysto w/removal of lesions small (N/A, 1/3/2020); pr bladder instillation anticarcinogenic agent (N/A, 1/3/2020); Cystoscopy; Cystoscopy (2021); Colonoscopy; pr trurl electrosurg rescj prostate bleed complete (N/A, 2021); and pr cystourethroscopy with biopsy (N/A, 2021).    Social:  Social History     Substance and Sexual Activity   Alcohol Use Not Currently    Comment: rarely     Social History     Tobacco Use   Smoking Status Former    Current packs/day: 0.00    Average packs/day: 2.5 packs/day for 42.0 years (105.0 ttl pk-yrs)    Types: Cigarettes    Start date:     Quit date:     Years since quittin.3   Smokeless Tobacco Former   Tobacco Comments    1 ppd for 37 years, 2010 down to 5 cigs a day, is around second hand smoke     Social History     Substance and Sexual Activity   Drug Use No     PE:  Vitals:    25 1500 25 1630 25 1700 25 1740   BP: 122/65 135/58 143/73 105/61   BP Location: Left arm Left arm Left arm Left arm   Pulse: 80 84 100 70   Resp: 20 20 20    Temp:    98.5 °F (36.9 °C)   TempSrc:    Oral   SpO2: 98% 100% 98% 96%         Physical exam:  GENERAL APPEARANCE: Chronically ill appearing  NEURO: GCS 15, no gross focal deficits, cranial nerves grossly intact, clear fluent speech, no facial asymmetry   HEENT: Normocephalic, atraumatic, moist mucous membranes   Neck: Supple, full ROM  CV: RRR, no murmurs, rubs, or gallops  LUNGS: Bilateral end expiratory wheezing and decreased breath sounds bilaterally. No rales or rhonchi. +Tachypnea. No accessory muscle use.   GI: Abdomen soft, non-tender, no rebound or guarding   MSK: Bilateral pretibial 2+ symmetric pitting edema. Extremities non-tender, no erythema, no crepitus, no induration  SKIN: Warm and dry      Assessment and plan: This is a 70 y.o. male with a history of CHF, COPD on 4L O2 via NC at baseline, presenting for evaluation of bilateral lower extremity swelling. Also has somewhat labored  breathing, although he feels this is his baseline. Within the ddx consider CHF exacerbation, Within pneumonia, CHF, pneumothorax, PE, ACS, COPD. Will obtain workup to assess for etiologies, give IV lasix.          Final assessment: CXR shows vascular congestion and BNP is elevated. Likely CHF exacerbation. Admitting to medicine for further management.     Code Status: Level 1 - Full Code  Advance Directive and Living Will:      Power of :    POLST:      Medications   heparin (porcine) subcutaneous injection 5,000 Units (has no administration in time range)   insulin lispro (HumALOG/ADMELOG) 100 units/mL subcutaneous injection 1-5 Units (has no administration in time range)   insulin lispro (HumALOG/ADMELOG) 100 units/mL subcutaneous injection 1-5 Units (has no administration in time range)   albuterol (PROVENTIL HFA,VENTOLIN HFA) inhaler 2 puff (has no administration in time range)   aspirin chewable tablet 81 mg (has no administration in time range)   budesonide (PULMICORT) inhalation solution 0.5 mg (has no administration in time range)   ipratropium-albuterol (DUO-NEB) 0.5-2.5 mg/3 mL inhalation solution 3 mL (has no administration in time range)   losartan (COZAAR) tablet 12.5 mg (has no administration in time range)   predniSONE tablet 10 mg (has no administration in time range)   furosemide (LASIX) injection 40 mg (has no administration in time range)   furosemide (LASIX) injection 60 mg (60 mg Intravenous Given 5/13/25 1323)     XR chest 2 views   Final Result      Emphysematous changes. Mild pulmonary vascular congestion. No focal airspace disease in the left upper lobe may be due to focal edema versus pneumonia.            Workstation performed: [id number]           Orders Placed This Encounter   Procedures    POC Cardiac US    Sputum culture and Gram stain    COVID/FLU/RSV    XR chest 2 views    CBC and differential    Comprehensive metabolic panel    HS Troponin 0hr (reflex protocol)    B-Type  Natriuretic Peptide(BNP)    HS Troponin I 2hr    HS Troponin I 4hr    Procalcitonin    Basic metabolic panel    CBC and Platelet    Procalcitonin    Diet Cardiovascular; Sodium 2 GM; Consistent Carbohydrate Diet Level 2 (5 carb servings/75 grams CHO/meal)    Nursing communication Continue IV as ordered.    Notify admitting physician    Notify admitting physician on arrival    Insulin Subcutaneous Notify Physician    Insulin Subcutaneous Instruction    Hypoglycemia Protocol    Vital Signs per unit routine    I/O    Pulse Oximetry,Spot    Activity as Tolerated    Out of Bed to Chair    Apply SCD or Foot pumps    Fingerstick Glucose (POCT)    Daily weights    Intake and Output    24 Hour Telemetry Monitoring    Level 1-Full Code: all life saving measures are indicated    Bipap    Respiratory Protocol    Place in Observation     Labs Reviewed   CBC AND DIFFERENTIAL - Abnormal       Result Value Ref Range Status    WBC 11.18 (*) 4.31 - 10.16 Thousand/uL Final    RBC 3.34 (*) 3.88 - 5.62 Million/uL Final    Hemoglobin 10.2 (*) 12.0 - 17.0 g/dL Final    Hematocrit 34.4 (*) 36.5 - 49.3 % Final     (*) 82 - 98 fL Final    MCH 30.5  26.8 - 34.3 pg Final    MCHC 29.7 (*) 31.4 - 37.4 g/dL Final    RDW 12.6  11.6 - 15.1 % Final    MPV 9.5  8.9 - 12.7 fL Final    Platelets 265  149 - 390 Thousands/uL Final    nRBC 0  /100 WBCs Final    Comment: This is an appended report.  These results have been appended to a previously preliminary verified report.    Segmented % 93 (*) 43 - 75 % Final    Immature Grans % 0  0 - 2 % Final    Lymphocytes % 2 (*) 14 - 44 % Final    Monocytes % 5  4 - 12 % Final    Eosinophils Relative 0  0 - 6 % Final    Basophils Relative 0  0 - 1 % Final    Absolute Neutrophils 10.28 (*) 1.85 - 7.62 Thousands/µL Final    Absolute Immature Grans 0.04  0.00 - 0.20 Thousand/uL Final    Absolute Lymphocytes 0.24 (*) 0.60 - 4.47 Thousands/µL Final    Absolute Monocytes 0.58  0.17 - 1.22 Thousand/µL Final     Eosinophils Absolute 0.02  0.00 - 0.61 Thousand/µL Final    Basophils Absolute 0.02  0.00 - 0.10 Thousands/µL Final    Narrative:     This is an appended report.  These results have been appended to a previously verified report.   COMPREHENSIVE METABOLIC PANEL - Abnormal    Sodium 137  135 - 147 mmol/L Final    Potassium 3.8  3.5 - 5.3 mmol/L Final    Chloride 84 (*) 96 - 108 mmol/L Final    CO2 >45 (*) 21 - 32 mmol/L Final    ANION GAP     Final    Comment: Unable to calculate    BUN 26 (*) 5 - 25 mg/dL Final    Creatinine 0.83  0.60 - 1.30 mg/dL Final    Comment: Standardized to IDMS reference method    Glucose 110  65 - 140 mg/dL Final    Comment: If the patient is fasting, the ADA then defines impaired fasting glucose as > 100 mg/dL and diabetes as > or equal to 123 mg/dL.    Calcium 8.9  8.4 - 10.2 mg/dL Final    AST 18  13 - 39 U/L Final    ALT 10  7 - 52 U/L Final    Comment: Specimen collection should occur prior to Sulfasalazine administration due to the potential for falsely depressed results.     Alkaline Phosphatase 55  34 - 104 U/L Final    Total Protein 6.3 (*) 6.4 - 8.4 g/dL Final    Albumin 3.9  3.5 - 5.0 g/dL Final    Total Bilirubin 0.45  0.20 - 1.00 mg/dL Final    Comment: Use of this assay is not recommended for patients undergoing treatment with eltrombopag due to the potential for falsely elevated results.  N-acetyl-p-benzoquinone imine (metabolite of Acetaminophen) will generate erroneously low results in samples for patients that have taken an overdose of Acetaminophen.    eGFR 90  ml/min/1.73sq m Final    Narrative:     National Kid[employer] guidelines for Chronic Kidney Disease (CKD):     Stage 1 with normal or high GFR (GFR > 90 mL/min/1.73 square meters)    Stage 2 Mild CKD (GFR = 60-89 mL/min/1.73 square meters)    Stage 3A Moderate CKD (GFR = 45-59 mL/min/1.73 square meters)    Stage 3B Moderate CKD (GFR = 30-44 mL/min/1.73 square meters)    Stage 4 Severe CKD (GFR = 15-29  "mL/min/1.73 square meters)    Stage 5 End Stage CKD (GFR <15 mL/min/1.73 square meters)  Note: GFR calculation is accurate only with a steady state creatinine   B-TYPE NATRIURETIC PEPTIDE (BNP) - Abnormal     (*) 0 - 100 pg/mL Final   HS TROPONIN I 0HR - Normal    hs TnI 0hr 30  \"Refer to ACS Flowchart\"- see link ng/L Final    Comment:                                              Initial (time 0) result  If >=50 ng/L, Myocardial injury suggested ;  Type of myocardial injury and treatment strategy  to be determined.  If 5-49 ng/L, a delta result at 2 hours will be needed to further evaluate.  If <4 ng/L, and chest pain has been >3 hours since onset, patient may qualify for discharge based on the HEART score in the ED.  If <5 ng/L and <3hours since onset of chest pain, a delta result at 2 hours will be needed to further evaluate.    HS Troponin 99th Percentile URL of a Health Population=12 ng/L with a 95% Confidence Interval of 8-18 ng/L.    Second Troponin (time 2 hours)  If calculated delta >= 20 ng/L,  Myocardial injury suggested ; Type of myocardial injury and treatment strategy to be determined.  If 5-49 ng/L and the calculated delta is 5-19 ng/L, consult medical service for evaluation.  Continue evaluation for ischemia on ecg and other possible etiology and repeat hs troponin at 4 hours.  If delta is <5 ng/L at 2 hours, consider discharge based on risk stratification via the HEART score (if in ED), or MARK risk score in IP/Observation.    HS Troponin 99th Percentile URL of a Health Population=12 ng/L with a 95% Confidence Interval of 8-18 ng/L.   HS TROPONIN I 2HR - Normal    hs TnI 2hr 31  \"Refer to ACS Flowchart\"- see link ng/L Final    Comment:                                              Initial (time 0) result  If >=50 ng/L, Myocardial injury suggested ;  Type of myocardial injury and treatment strategy  to be determined.  If 5-49 ng/L, a delta result at 2 hours will be needed to further " evaluate.  If <4 ng/L, and chest pain has been >3 hours since onset, patient may qualify for discharge based on the HEART score in the ED.  If <5 ng/L and <3hours since onset of chest pain, a delta result at 2 hours will be needed to further evaluate.    HS Troponin 99th Percentile URL of a Health Population=12 ng/L with a 95% Confidence Interval of 8-18 ng/L.    Second Troponin (time 2 hours)  If calculated delta >= 20 ng/L,  Myocardial injury suggested ; Type of myocardial injury and treatment strategy to be determined.  If 5-49 ng/L and the calculated delta is 5-19 ng/L, consult medical service for evaluation.  Continue evaluation for ischemia on ecg and other possible etiology and repeat hs troponin at 4 hours.  If delta is <5 ng/L at 2 hours, consider discharge based on risk stratification via the HEART score (if in ED), or MARK risk score in IP/Observation.    HS Troponin 99th Percentile URL of a Health Population=12 ng/L with a 95% Confidence Interval of 8-18 ng/L.    Delta 2hr hsTnI 1  <20 ng/L Final   SPUTUM CULTURE AND GRAM STAIN   COVID19, INFLUENZA A/B, RSV PCR, [facility]   HS TROPONIN I 4HR         Time reflects when diagnosis was documented in both MDM as applicable and the Disposition within this note       Time User Action Codes Description Comment    5/13/2025  4:19 PM Starr Mckeon Add [I50.9] CHF exacerbation ([facility])     5/13/2025  4:19 PM Starr Mckeon Add [J44.9] COPD (chronic obstructive pulmonary disease) ([facility])           ED Disposition       ED Disposition   Admit    Condition   Stable    Date/Time   Tue May 13, 2025  4:01 PM    Comment   .               Follow-up Information    None       Current Discharge Medication List        CONTINUE these medications which have NOT CHANGED    Details   albuterol (PROVENTIL HFA,VENTOLIN HFA) 90 mcg/act inhaler Inhale 2 puffs every 6 (six) hours as needed for wheezing or shortness of breath  Qty: 18 g, Refills: 11    Comments: Substitution to a  formulary equivalent within the same pharmaceutical class is authorized.  Associated Diagnoses: End stage COPD (HCC)      Aspirin Low Dose 81 MG chewable tablet CHEW 1 TABLET BY MOUTH DAILY  Qty: 90 tablet, Refills: 1    Associated Diagnoses: CAD (coronary artery disease)      budesonide (PULMICORT) 0.5 mg/2 mL nebulizer solution TAKE 2 ML (0.5 MG TOTAL) BY NEBULIZATION TWICE A DAY RINSE MOUTH AFTER USE  Qty: 1080 mL, Refills: 1    Associated Diagnoses: End stage COPD (HCC)      ipratropium-albuterol (DUO-NEB) 0.5-2.5 mg/3 mL nebulizer solution Take 3 mL by nebulization 4 (four) times a day  Qty: 360 mL, Refills: 5    Associated Diagnoses: End stage COPD (HCC)      losartan (COZAAR) 25 mg tablet TAKE 1/2 TABLET BY MOUTH EVERY DAY  Qty: 45 tablet, Refills: 1    Associated Diagnoses: Acute on chronic systolic congestive heart failure (HCC)      predniSONE 10 mg tablet Take 1 tablet (10 mg total) by mouth daily  Qty: 30 tablet, Refills: 6    Comments: DX Code Needed  .  Associated Diagnoses: End stage COPD (HCC)      torsemide (DEMADEX) 20 mg tablet Take 2 tablets (40 mg total) by mouth daily  Qty: 60 tablet, Refills: 0    Associated Diagnoses: Acute on chronic systolic congestive heart failure (HCC)           No discharge procedures on file.  Prior to Admission Medications   Prescriptions Last Dose Informant Patient Reported? Taking?   Aspirin Low Dose 81 MG chewable tablet   No No   Sig: CHEW 1 TABLET BY MOUTH DAILY   albuterol (PROVENTIL HFA,VENTOLIN HFA) 90 mcg/act inhaler   No No   Sig: Inhale 2 puffs every 6 (six) hours as needed for wheezing or shortness of breath   budesonide (PULMICORT) 0.5 mg/2 mL nebulizer solution   No No   Sig: TAKE 2 ML (0.5 MG TOTAL) BY NEBULIZATION TWICE A DAY RINSE MOUTH AFTER USE   ipratropium-albuterol (DUO-NEB) 0.5-2.5 mg/3 mL nebulizer solution   No No   Sig: Take 3 mL by nebulization 4 (four) times a day   losartan (COZAAR) 25 mg tablet   No No   Sig: TAKE 1/2 TABLET BY MOUTH  "EVERY DAY   predniSONE 10 mg tablet   No No   Sig: Take 1 tablet (10 mg total) by mouth daily   torsemide (DEMADEX) 20 mg tablet   No No   Sig: Take 2 tablets (40 mg total) by mouth daily      Facility-Administered Medications: None         Portions of the record may have been created with voice recognition software. Occasional wrong word or \"sound a like\" substitutions may have occurred due to the inherent limitations of voice recognition software. Read the chart carefully and recognize, using context, where substitutions have occurred.    Electronically signed by:  Dian Morgan      "

## 2025-05-13 NOTE — ASSESSMENT & PLAN NOTE
Lab Results   Component Value Date    HGBA1C 6.1 (H) 03/14/2025     Recent Labs     05/13/25  1759   POCGLU 156*     Blood Sugar Average: Last 72 hrs:  (P) 156  Continue ISS and carb controlled diet

## 2025-05-13 NOTE — ED PROCEDURE NOTE
Procedure  POC Cardiac US    Date/Time: 5/13/2025 2:50 PM    Performed by: Yogesh Tate DO  Authorized by: Yogesh Tate DO    Patient location:  ED  Other Assisting Provider: No    Procedure details:     Exam Type:  Diagnostic    Indications: dyspnea      Assessment / Evaluation for: cardiac function, pericardial effusion and right heart strain (suspected pulmonary embolism)      Exam Type: initial exam      Image quality: diagnostic      Image availability:  Images available in PACS  Patient Details:     Cardiac Rhythm:  Regular  Cardiac findings:     Echo technique: limited 2D and M-mode      Views obtained: subcostal and apical      Pericardial effusion: absent      Tamponade physiology: absent      Wall motion: hypodynamic      LV systolic function: depressed      RV dilation: none    Pulmonary findings:     Left Lung Findings: left lung sliding      Right lung findings: right lung sliding      B-lines: more than 3    Interpretation:      Parasternal views not obtained due to lung hyperinflation.  Good quality apical and subxiphoid views reveal a dilated and hypokinetic left ventricle with lateral wall motion hypokinesis. VTI 19. MAPSE 10. No RV dilation, No pericardial effusion.     Lung views reveal abundant A-lines and B-lines throughout all lung fields with intact lung sliding and no pleural effusion.                   Yogesh Tate DO  05/13/25 1516

## 2025-05-13 NOTE — ASSESSMENT & PLAN NOTE
Known nonischemic cardiomyopathy with LVEF 25% and GIDD in 3/16/25  Presented to the ED for increased swelling of the feet.  Remains on his baseline oxygen but does endorse some mild dyspnea  He took an additional dose of torsemide in the outpatient setting with no improvement in swelling  Will place on observation and diurese with IV Lasix at 40 mg every 12 hour  I/O and daily weight ordered  , appears lower than patient's baseline  X-ray with mild pulmonary vascular congestion  Will check respiratory viral panel for alternative cause of patient's dyspnea given leukocytosis at 11.18 and possible left upper lobe infiltrate.  Pro-Jorge Alberto added on and will repeat Pro-Jorge Alberto level in the morning

## 2025-05-13 NOTE — ASSESSMENT & PLAN NOTE
Remains on 4 L O2 via nasal cannula which is his baseline oxygen need  Continue home medications including DuoNeb, Pulmicort, albuterol, prednisone 10 mg p.o. daily  Emphysematous changes noted on x-ray, possible left upper lobe infiltrate, will add procalcitonin and repeat Pro-Jorge Alberto in the morning  Respiratory protocol ordered

## 2025-05-13 NOTE — ED PROVIDER NOTES
Time reflects when diagnosis was documented in both MDM as applicable and the Disposition within this note       Time User Action Codes Description Comment    5/13/2025  4:19 PM MindyNehemias javedhi Add [I50.9] CHF exacerbation (HCC)     5/13/2025  4:19 PM JorgejuarezStarr javed Add [J44.9] COPD (chronic obstructive pulmonary disease) ([facility])           ED Disposition       ED Disposition   Admit    Condition   Stable    Date/Time   Tue May 13, 2025  4:01 PM    Comment   .               Assessment & Plan       Medical Decision Making  Amount and/or Complexity of Data Reviewed  Labs: ordered.  Radiology: ordered.    Risk  Prescription drug management.  Decision regarding hospitalization.    Pt is a 70-year-old male with history of CHF, COPD, baseline oxygen of 4 L, diuretic use at home, presents for evaluation of bilateral lower extremity swelling for the last 1 week.  Patient's Lasix dose has been increased from 40 to 60 mg not much improvement.  Patient and family also thinks his abdomen is mildly distended.  Has chronic dyspnea.  Patient denies any chest pain, urinary symptoms, nausea, vomiting, diarrhea, headache, or any other complaints.    Initial presentation pt is chronically ill-appearing, no acute distress.    On exam   General: Chronically ill-appearing, no acute distress, [no 4 L oxygen.  Head: Normocephalic, atraumatic, nontender.  Eyes: PERRL, EOM-I. No diplopia.   No hyphema.   No subconjunctival hemorrhages.  Symmetrical lids.   ENT: Atraumatic external nose and ears.    MMM  No malocclusion. No stridor. Normal phonation. No drooling. Normal swallowing.   Neck: Symmetric, trachea midline. No JVD.  CV: RRR. +S1/S2  No murmurs or gallops  Peripheral pulses +2 throughout. No chest wall tenderness.   Lungs:   Bilateral expiratory wheezing,  tachypnea, mildly decreased breath sounds bilaterally.  No focal Rales or rhonchi, .  No accessory muscle use.  Abd: +BS, soft, NT/ND.   MSK:   FROM   Back:   No  rashes  Skin: Dry, intact.   Neuro: AAOx3, GCS 15, CN II-XII grossly intact.   Motor grossly intact.  Psychiatric/Behavioral: Appropriate mood and affect   Exam: deferred      Ddx: CHF exacerbation, COPD exacerbation, pneumonia, pneumothorax, ACS    Plan: Patient was evaluated with cardiac workup, BNP, viral swab, chest x-ray.  Given a dose of Lasix in the ED.  Admitted for further evaluation management likely CHF exacerbation/volume overload requiring IV Lasix.          ED Course as of 05/22/25 0251   Tue May 13, 2025   1620 Texted SLIM       Medications   furosemide (LASIX) injection 60 mg (60 mg Intravenous Given 5/13/25 1323)       ED Risk Strat Scores                    No data recorded                            History of Present Illness       Chief Complaint   Patient presents with    Leg Swelling     For a couple days due to CHF       Past Medical History:   Diagnosis Date    Acute metabolic encephalopathy 03/29/2022    Arthritis     Bladder mass     Cardiomyopathy (HCC)     Chest pain     COPD (chronic obstructive pulmonary disease) ([facility])     COPD exacerbation (HCC) 04/28/2023    COVID-19 virus infection 02/23/2023    CPAP (continuous positive airway pressure) dependence     Dependence on respirator (ventilator) status ([facility]) 01/10/2024    Emphysema of lung (HCC)     Hypoxia     nocturnal    Left bundle branch block     Macrocytosis without anemia 05/23/2019    Multiple pulmonary nodules     last assessed: 10/12/16    Osteoarthritis 08/03/2013    Pneumonia     Sleep apnea, obstructive     Smoker     Steroid-induced hyperglycemia 03/30/2023    Weight loss 08/29/2019      Past Surgical History:   Procedure Laterality Date    COLONOSCOPY      CYSTOSCOPY      CYSTOSCOPY  02/17/2021    [place] BLADDER INSTILLATION ANTICARCINOGENIC AGENT N/A 1/3/2020    Procedure: INSTILLATION MITOMYCIN;  Surgeon: Sundeep Canchola MD;  Location: [facility];  Service: Urology    [place] CYSTO W/REMOVAL OF LESIONS SMALL N/A 1/3/2020     Procedure: TRANSURETHRAL RESECTION OF BLADDER TUMOR (TURBT);  Surgeon: Sundeep Canchola MD;  Location: [facility];  Service: Urology    [place] CYSTOURETHROSCOPY WITH BIOPSY N/A 2021    Procedure: CYSTOSCOPY WITH BIOPSIES;  Surgeon: Sundeep Canchola MD;  Location: [facility];  Service: Urology    [place] TRURL ELECTROSURG RESCJ PROSTATE BLEED COMPLETE N/A 2021    Procedure: TRANSURETHRAL RESECTION OF PROSTATE (TURP) BLADDER BIOPSY, FULGURATION;  Surgeon: Sundeep Canchola MD;  Location: [facility];  Service: Urology      Family History   Problem Relation Name Age of Onset    Diabetes Mother        Social History     Tobacco Use    Smoking status: Former     Current packs/day: 0.00     Average packs/day: 2.5 packs/day for 42.0 years (105.0 ttl pk-yrs)     Types: Cigarettes     Start date:      Quit date:      Years since quittin.3    Smokeless tobacco: Former    Tobacco comments:     1 ppd for 37 years,  down to 5 cigs a day, is around second hand smoke   Vaping Use    Vaping status: Never Used   Substance Use Topics    Alcohol use: Not Currently     Comment: rarely    Drug use: No      E-Cigarette/Vaping    E-Cigarette Use Never User       E-Cigarette/Vaping Substances    Nicotine No     THC No     CBD No     Flavoring No     Other No     Unknown No       I have reviewed and agree with the history as documented.     HPI    Review of Systems        Objective       ED Triage Vitals   Temperature Pulse Blood Pressure Respirations SpO2 Patient Position - Orthostatic VS   25 1215 25 1205 25 1207 25 1205 25 1205 25 1205   97.8 °F (36.6 °C) 81 108/70 22 100 % Sitting      Temp Source Heart Rate Source BP Location FiO2 (%) Pain Score    25 1215 25 1205 25 1205 -- 25    Oral Monitor Left arm  No Pain      Vitals      Date and Time Temp Pulse SpO2 Resp BP Pain Score FACES Pain Rating User   25 1259 -- -- 97 % -- -- -- -- AT   25  1135 98.8 °F (37.1 °C) 89 95 % 16 117/67 -- -- DII   05/14/25 0800 -- -- 98 % -- -- No Pain -- BS   05/14/25 0758 -- 82 100 % -- 100/60 -- -- DII   05/14/25 0721 -- -- 95 % -- -- -- -- AT   05/14/25 0711 -- -- -- 18 -- -- -- MF   05/14/25 0711 97.9 °F (36.6 °C) 85 80 % -- 105/61 -- -- DII   05/14/25 0215 98.8 °F (37.1 °C) 48 99 % -- 98/58 -- -- DII   05/13/25 2340 -- -- -- 20 -- -- -- CS   05/13/25 2300 -- -- -- 20 -- -- -- CS   05/13/25 2236 -- -- -- 18 -- No Pain -- NP   05/13/25 2236 98.4 °F (36.9 °C) 76 99 % -- 105/61 -- -- DII   05/13/25 2000 -- -- -- -- -- No Pain -- NP   05/13/25 1740 -- -- -- 18 -- -- -- NP   05/13/25 1740 98.5 °F (36.9 °C) 70 96 % -- 105/61 -- -- DII   05/13/25 1700 -- 100 98 % 20 143/73 -- -- KH   05/13/25 1630 -- 84 100 % 20 135/58 -- -- KH   05/13/25 1500 -- 80 98 % 20 122/65 -- -- KH   05/13/25 1330 -- 84 98 % 20 117/62 -- --    05/13/25 1300 -- 52 99 % 20 113/58 -- --    05/13/25 1215 97.8 °F (36.6 °C) -- -- -- -- -- -- AS   05/13/25 1207 -- -- -- -- 108/70 -- --    05/13/25 1205 -- 81 100 % 22 -- -- --             Physical Exam    Results Reviewed       Procedure Component Value Units Date/Time    Basic metabolic panel [[id number]]  (Abnormal) Collected: 05/14/25 0633    Lab Status: Final result Specimen: Blood from Arm, Right Updated: 05/14/25 0749     Sodium 139 mmol/L      Potassium 3.5 mmol/L      Chloride 87 mmol/L      CO2 >45 mmol/L      ANION GAP --     BUN 21 mg/dL      Creatinine 0.76 mg/dL      Glucose 81 mg/dL      Glucose, Fasting 81 mg/dL      Calcium 8.6 mg/dL      eGFR 93 ml/min/1.73sq m     Narrative:      National Kidney Disease Foundation guidelines for Chronic Kidney Disease (CKD):     Stage 1 with normal or high GFR (GFR > 90 mL/min/1.73 square meters)    Stage 2 Mild CKD (GFR = 60-89 mL/min/1.73 square meters)    Stage 3A Moderate CKD (GFR = 45-59 mL/min/1.73 square meters)    Stage 3B Moderate CKD (GFR = 30-44 mL/min/1.73 square meters)    Stage 4 Severe  CKD (GFR = 15-29 mL/min/1.73 square meters)    Stage 5 End Stage CKD (GFR <15 mL/min/1.73 square meters)  Note: GFR calculation is accurate only with a steady state creatinine    COVID/FLU/RSV [[id number]]  (Normal) Collected: 05/14/25 0633    Lab Status: Final result Specimen: Nares from Nose Updated: 05/14/25 0736     SARS-CoV-2 Negative     INFLUENZA A PCR Negative     INFLUENZA B PCR Negative     RSV PCR Negative    Narrative:      This test has been performed using the CoV-2/Flu/RSV plus assay on the [employer] platform. This test has been validated by the  and verified by the performing laboratory.     This test is designed to amplify and detect the following: nucleocapsid (N), envelope (E), and RNA-dependent RNA polymerase (RdRP) genes of the SARS-CoV-2 genome; matrix (M), basic polymerase (PB2), and acidic protein (PA) segments of the influenza A genome; matrix (M) and non-structural protein (NS) segments of the influenza B genome, and the nucleocapsid genes of RSV A and RSV B.     Positive results are indicative of the presence of Flu A, Flu B, RSV, and/or SARS-CoV-2 RNA. Positive results for SARS-CoV-2 or suspected novel influenza should be reported to state, local, or federal health departments according to local reporting requirements.      All results should be assessed in conjunction with clinical presentation and other laboratory markers for clinical management.     FOR PEDIATRIC PATIENTS - copy/paste COVID Guidelines URL to browser: [email or web address]       Procalcitonin [[id number]]  (Normal) Collected: 05/14/25 0633    Lab Status: Final result Specimen: Blood from Arm, Right Updated: 05/14/25 0733     Procalcitonin 0.08 ng/ml     CBC and Platelet [[id number]]  (Abnormal) Collected: 05/14/25 0633    Lab Status: Final result Specimen: Blood from Arm, Right Updated: 05/14/25 0723     WBC 7.41 Thousand/uL      RBC 3.08 Million/uL       Hemoglobin 9.5 g/dL      Hematocrit 32.3 %       fL      MCH 30.8 pg      MCHC 29.4 g/dL      RDW 12.8 %      Platelets 248 Thousands/uL      MPV 9.7 fL     HS Troponin I 4hr [[id number]]  (Normal) Collected: 05/13/25 1824    Lab Status: Final result Specimen: Blood from Arm, Left Updated: 05/13/25 1908     hs TnI 4hr 37 ng/L      Delta 4hr hsTnI 7 ng/L     Procalcitonin [[id number]]  (Normal) Collected: 05/13/25 1529    Lab Status: Final result Specimen: Blood from Arm, Right Updated: 05/13/25 1803     Procalcitonin 0.07 ng/ml     Sputum culture and Gram stain [[id number]]     Lab Status: No result Specimen: Expectorated Sputum     HS Troponin I 2hr [[id number]]  (Normal) Collected: 05/13/25 1529    Lab Status: Final result Specimen: Blood from Arm, Right Updated: 05/13/25 1627     hs TnI 2hr 31 ng/L      Delta 2hr hsTnI 1 ng/L     Comprehensive metabolic panel [[id number]]  (Abnormal) Collected: 05/13/25 1252    Lab Status: Final result Specimen: Blood from Arm, Right Updated: 05/13/25 1447     Sodium 137 mmol/L      Potassium 3.8 mmol/L      Chloride 84 mmol/L      CO2 >45 mmol/L      ANION GAP --     BUN 26 mg/dL      Creatinine 0.83 mg/dL      Glucose 110 mg/dL      Calcium 8.9 mg/dL      AST 18 U/L      ALT 10 U/L      Alkaline Phosphatase 55 U/L      Total Protein 6.3 g/dL      Albumin 3.9 g/dL      Total Bilirubin 0.45 mg/dL      eGFR 90 ml/min/1.73sq m     Narrative:      National Kidney Disease Foundation guidelines for Chronic Kidney Disease (CKD):     Stage 1 with normal or high GFR (GFR > 90 mL/min/1.73 square meters)    Stage 2 Mild CKD (GFR = 60-89 mL/min/1.73 square meters)    Stage 3A Moderate CKD (GFR = 45-59 mL/min/1.73 square meters)    Stage 3B Moderate CKD (GFR = 30-44 mL/min/1.73 square meters)    Stage 4 Severe CKD (GFR = 15-29 mL/min/1.73 square meters)    Stage 5 End Stage CKD (GFR <15 mL/min/1.73 square meters)  Note: GFR calculation is accurate only with a steady state creatinine     CBC and differential [[id number]]  (Abnormal) Collected: 05/13/25 1252    Lab Status: Final result Specimen: Blood from Arm, Right Updated: 05/13/25 1345     WBC 11.18 Thousand/uL      RBC 3.34 Million/uL      Hemoglobin 10.2 g/dL      Hematocrit 34.4 %       fL      MCH 30.5 pg      MCHC 29.7 g/dL      RDW 12.6 %      MPV 9.5 fL      Platelets 265 Thousands/uL      nRBC 0 /100 WBCs      Segmented % 93 %      Immature Grans % 0 %      Lymphocytes % 2 %      Monocytes % 5 %      Eosinophils Relative 0 %      Basophils Relative 0 %      Absolute Neutrophils 10.28 Thousands/µL      Absolute Immature Grans 0.04 Thousand/uL      Absolute Lymphocytes 0.24 Thousands/µL      Absolute Monocytes 0.58 Thousand/µL      Eosinophils Absolute 0.02 Thousand/µL      Basophils Absolute 0.02 Thousands/µL     Narrative:      This is an appended report.  These results have been appended to a previously verified report.    B-Type Natriuretic Peptide(BNP) [[id number]]  (Abnormal) Collected: 05/13/25 1252    Lab Status: Final result Specimen: Blood from Arm, Right Updated: 05/13/25 1337      pg/mL     HS Troponin 0hr (reflex protocol) [[id number]]  (Normal) Collected: 05/13/25 1252    Lab Status: Final result Specimen: Blood from Arm, Right Updated: 05/13/25 1331     hs TnI 0hr 30 ng/L             XR chest 2 views   Final Interpretation by Prachi Carlos MD (05/13 1550)      Emphysematous changes. Mild pulmonary vascular congestion. No focal airspace disease in the left upper lobe may be due to focal edema versus pneumonia.            Workstation performed: [id number]             Procedures    ED Medication and Procedure Management   Prior to Admission Medications   Prescriptions Last Dose Informant Patient Reported? Taking?   Aspirin Low Dose 81 MG chewable tablet   No No   Sig: CHEW 1 TABLET BY MOUTH DAILY   albuterol (PROVENTIL HFA,VENTOLIN HFA) 90 mcg/act inhaler   No No   Sig: Inhale 2 puffs every 6 (six) hours as needed  for wheezing or shortness of breath   budesonide (PULMICORT) 0.5 mg/2 mL nebulizer solution   No No   Sig: TAKE 2 ML (0.5 MG TOTAL) BY NEBULIZATION TWICE A DAY RINSE MOUTH AFTER USE   ipratropium-albuterol (DUO-NEB) 0.5-2.5 mg/3 mL nebulizer solution   No No   Sig: Take 3 mL by nebulization 4 (four) times a day   losartan (COZAAR) 25 mg tablet   No No   Sig: TAKE 1/2 TABLET BY MOUTH EVERY DAY   predniSONE 10 mg tablet   No No   Sig: Take 1 tablet (10 mg total) by mouth daily   torsemide (DEMADEX) 20 mg tablet   No No   Sig: Take 2 tablets (40 mg total) by mouth daily      Facility-Administered Medications: None     Discharge Medication List as of 5/14/2025 12:14 PM        CONTINUE these medications which have NOT CHANGED    Details   albuterol (PROVENTIL HFA,VENTOLIN HFA) 90 mcg/act inhaler Inhale 2 puffs every 6 (six) hours as needed for wheezing or shortness of breath, Starting Tue 4/22/2025, Normal      Aspirin Low Dose 81 MG chewable tablet CHEW 1 TABLET BY MOUTH DAILY, Starting Mon 4/28/2025, Normal      budesonide (PULMICORT) 0.5 mg/2 mL nebulizer solution TAKE 2 ML (0.5 MG TOTAL) BY NEBULIZATION TWICE A DAY RINSE MOUTH AFTER USE, Normal      ipratropium-albuterol (DUO-NEB) 0.5-2.5 mg/3 mL nebulizer solution Take 3 mL by nebulization 4 (four) times a day, Starting Wed 8/21/2024, Normal      losartan (COZAAR) 25 mg tablet TAKE 1/2 TABLET BY MOUTH EVERY DAY, Starting Mon 4/28/2025, Normal      predniSONE 10 mg tablet Take 1 tablet (10 mg total) by mouth daily, Starting Thu 2/27/2025, Normal      torsemide (DEMADEX) 20 mg tablet Take 2 tablets (40 mg total) by mouth daily, Starting Sat 4/5/2025, Normal           Outpatient Discharge Orders   Discharge Diet     Activity as tolerated     ED SEPSIS DOCUMENTATION   Time reflects when diagnosis was documented in both MDM as applicable and the Disposition within this note       Time User Action Codes Description Comment    5/13/2025  4:19 PM Starr Mckeon Add  [I50.9] CHF exacerbation ([facility])     5/13/2025  4:19 PM Starr Mckeon Add [J44.9] COPD (chronic obstructive pulmonary disease) ([facility])                  Starr Mckeon DO  05/22/25 0254

## 2025-05-13 NOTE — H&P
H&P - Hospitalist   Name: Natalio Hartmann Jr. 70 y.o. male I MRN: [id number]  Unit/Bed#: [facility] 402-01 I Date of Admission: 5/13/2025   Date of Service: 5/13/2025 I Hospital Day: 0     Assessment & Plan  Nonischemic cardiomyopathy (HCC)  Known nonischemic cardiomyopathy with LVEF 25% and GIDD in 3/16/25  Presented to the ED for increased swelling of the feet.  Remains on his baseline oxygen but does endorse some mild dyspnea  He took an additional dose of torsemide in the outpatient setting with no improvement in swelling  Will place on observation and diurese with IV Lasix at 40 mg every 12 hour  I/O and daily weight ordered  , appears lower than patient's baseline  X-ray with mild pulmonary vascular congestion  Will check respiratory viral panel for alternative cause of patient's dyspnea given leukocytosis at 11.18 and possible left upper lobe infiltrate.  Pro-Jorge Alberto added on and will repeat Pro-Jorge Alberto level in the morning  Obstructive sleep apnea  Uses trilogy vent at night, BiPAP at bedtime will be ordered  Goals of care, counseling/discussion  Patient with end-stage COPD, has discussed with palliative care and multiple providers in the past.  He wishes to be full code at this time  Chronic anemia  Hemoglobin 10.2 appears within baseline, no acute bleed suspected  Chronic hypercapnia/KEVIN  Continue chronic oxygen at his home 4 L.  BiPAP nightly ordered  End stage COPD (HCC)  Remains on 4 L O2 via nasal cannula which is his baseline oxygen need  Continue home medications including DuoNeb, Pulmicort, albuterol, prednisone 10 mg p.o. daily  Emphysematous changes noted on x-ray, possible left upper lobe infiltrate, will add procalcitonin and repeat Pro-Jorge Alberto in the morning  Respiratory protocol ordered  Type 2 diabetes mellitus without complication, with long-term current use of insulin (HCC)  Lab Results   Component Value Date    HGBA1C 6.1 (H) 03/14/2025     Recent Labs     05/13/25  1759   POCGLU 156*     Blood Sugar  Average: Last 72 hrs:  (P) 156  Continue ISS and carb controlled diet    VTE Pharmacologic Prophylaxis:   Moderate Risk (Score 3-4) - Pharmacological DVT Prophylaxis Ordered: heparin.  Code Status: Level 1 - Full Code   Discussion with family: Updated  (wife) at bedside.    Anticipated Length of Stay: Patient will be admitted on an observation basis with an anticipated length of stay of less than 2 midnights secondary to foot swelling.    History of Present Illness   Chief Complaint: Dyspnea and foot swelling    Natalio Hartmann Jr. is a 70 y.o. male with a PMH of nonischemic cardiomyopathy, end-stage COPD, hypertension among others who presents with foot swelling and dyspnea.  He states he has been in a pretty good state of health and has been adherent to his Demadex, however since last Friday 5/9 patient and significant other at bedside have been noting increasing swelling of the feet.  He took additional doses of Demadex without improvement and so decided to come to the ED.  As far as his dyspnea goes, he remains on his baseline 4 L but does feel little more dyspneic than normal.  He feels like he has to cough, but nothing is coming up.  Denies fever, chills, or any other concerns at present.  He is hopeful to go home tomorrow.    Review of Systems   Constitutional:  Negative for chills and fever.   Eyes:  Negative for visual disturbance.   Respiratory:  Positive for cough and shortness of breath.    Cardiovascular:  Positive for leg swelling (foot). Negative for chest pain and palpitations.   Gastrointestinal:  Negative for abdominal pain and vomiting.   Neurological:  Negative for syncope and weakness.   All other systems reviewed and are negative.      Historical Information   Past Medical History:   Diagnosis Date    Acute metabolic encephalopathy 03/29/2022    Arthritis     Bladder mass     Cardiomyopathy (HCC)     Chest pain     COPD (chronic obstructive pulmonary disease) (HCC)     COPD  exacerbation (HCC) 2023    COVID-19 virus infection 2023    CPAP (continuous positive airway pressure) dependence     Dependence on respirator (ventilator) status (Former[facility]) 01/10/2024    Emphysema of lung ([facility])     Hypoxia     nocturnal    Left bundle branch block     Macrocytosis without anemia 2019    Multiple pulmonary nodules     last assessed: 10/12/16    Osteoarthritis 2013    Pneumonia     Sleep apnea, obstructive     Smoker     Steroid-induced hyperglycemia 2023    Weight loss 2019     Past Surgical History:   Procedure Laterality Date    COLONOSCOPY      CYSTOSCOPY      CYSTOSCOPY  2021    VT BLADDER INSTILLATION ANTICARCINOGENIC AGENT N/A 1/3/2020    Procedure: INSTILLATION MITOMYCIN;  Surgeon: Sundeep Canchola MD;  Location: [facility];  Service: Urology    VT CYSTO W/REMOVAL OF LESIONS SMALL N/A 1/3/2020    Procedure: TRANSURETHRAL RESECTION OF BLADDER TUMOR (TURBT);  Surgeon: Sundeep Canchola MD;  Location: [facility];  Service: Urology    VT CYSTOURETHROSCOPY WITH BIOPSY N/A 2021    Procedure: CYSTOSCOPY WITH BIOPSIES;  Surgeon: Sundeep Canchola MD;  Location: [facility];  Service: Urology    VT TRURL ELECTROSURG RESCJ PROSTATE BLEED COMPLETE N/A 2021    Procedure: TRANSURETHRAL RESECTION OF PROSTATE (TURP) BLADDER BIOPSY, FULGURATION;  Surgeon: Sundeep Canchola MD;  Location: [facility];  Service: Urology     Social History     Tobacco Use    Smoking status: Former     Current packs/day: 0.00     Average packs/day: 2.5 packs/day for 42.0 years (105.0 ttl pk-yrs)     Types: Cigarettes     Start date:      Quit date: 2012     Years since quittin.3    Smokeless tobacco: Former    Tobacco comments:     1 ppd for 37 years, 2010 down to 5 cigs a day, is around second hand smoke   Vaping Use    Vaping status: Never Used   Substance and Sexual Activity    Alcohol use: Not Currently     Comment: rarely    Drug use: No    Sexual activity: Not  Currently     Partners: Female     E-Cigarette/Vaping    E-Cigarette Use Never User      E-Cigarette/Vaping Substances    Nicotine No     THC No     CBD No     Flavoring No     Other No     Unknown No      Family History   Problem Relation Age of Onset    Diabetes Mother      Social History:  Marital Status: /Civil Union   Occupation: Not on file  Patient Pre-hospital Living Situation: Home  Patient Pre-hospital Level of Mobility: walks  Patient Pre-hospital Diet Restrictions: N/A    Meds/Allergies   I have reviewed home medications with patient personally.  Prior to Admission medications    Medication Sig Start Date End Date Taking? Authorizing Provider   albuterol (PROVENTIL HFA,VENTOLIN HFA) 90 mcg/act inhaler Inhale 2 puffs every 6 (six) hours as needed for wheezing or shortness of breath 4/22/25   Malia Luque DO   Aspirin Low Dose 81 MG chewable tablet CHEW 1 TABLET BY MOUTH DAILY 4/28/25   Fatmata Gustafson DO   budesonide (PULMICORT) 0.5 mg/2 mL nebulizer solution TAKE 2 ML (0.5 MG TOTAL) BY NEBULIZATION TWICE A DAY RINSE MOUTH AFTER USE 8/28/24   DELIA Ackerman   ipratropium-albuterol (DUO-NEB) 0.5-2.5 mg/3 mL nebulizer solution Take 3 mL by nebulization 4 (four) times a day 8/21/24   DELIA Mazariegos   losartan (COZAAR) 25 mg tablet TAKE 1/2 TABLET BY MOUTH EVERY DAY 4/28/25   Fatmata Gustafson DO   predniSONE 10 mg tablet Take 1 tablet (10 mg total) by mouth daily 2/27/25   Malia Luque DO   torsemide (DEMADEX) 20 mg tablet Take 2 tablets (40 mg total) by mouth daily 4/5/25   Remi Rodriguez MD   budesonide-formoterol (Symbicort) 160-4.5 mcg/act inhaler Inhale 2 puffs 2 (two) times a day Rinse mouth after use. 8/29/22 8/29/22  Rocky Lino DO   mometasone-formoterol (Dulera) 200-5 MCG/ACT inhaler Inhale 2 puffs 2 (two) times a day Rinse mouth after use. 8/29/22 8/29/22  Rocky Lino DO     No Known Allergies    Objective :  Temp:  [97.8 °F (36.6  °C)-98.5 °F (36.9 °C)] 98.5 °F (36.9 °C)  HR:  [] 70  BP: (105-143)/(58-73) 105/61  Resp:  [20-22] 20  SpO2:  [96 %-100 %] 96 %  O2 Device: Nasal cannula  Nasal Cannula O2 Flow Rate (L/min):  [4 L/min] 4 L/min    Physical Exam  Vitals and nursing note reviewed.   Constitutional:       General: He is not in acute distress.     Appearance: He is well-developed.   HENT:      Head: Normocephalic and atraumatic.      Mouth/Throat:      Mouth: Mucous membranes are moist.   Eyes:      General: No scleral icterus.     Conjunctiva/sclera: Conjunctivae normal.   Cardiovascular:      Rate and Rhythm: Normal rate and regular rhythm.      Heart sounds: No murmur heard.  Pulmonary:      Effort: Pulmonary effort is normal. No respiratory distress.      Breath sounds: Normal breath sounds. No stridor. No wheezing.   Abdominal:      General: Abdomen is flat. Bowel sounds are normal.      Palpations: Abdomen is soft.      Tenderness: There is no abdominal tenderness.   Musculoskeletal:         General: No swelling.      Cervical back: Neck supple.      Right lower leg: No edema.      Left lower leg: No edema.      Comments: Mild foot erythema and swelling   Skin:     General: Skin is warm and dry.      Capillary Refill: Capillary refill takes less than 2 seconds.   Neurological:      General: No focal deficit present.      Mental Status: He is alert and oriented to person, place, and time.   Psychiatric:         Mood and Affect: Mood normal.            Lab Results: I have reviewed the following results:  Results from last 7 days   Lab Units 05/13/25  1252   WBC Thousand/uL 11.18*   HEMOGLOBIN g/dL 10.2*   HEMATOCRIT % 34.4*   PLATELETS Thousands/uL 265   SEGS PCT % 93*   LYMPHO PCT % 2*   MONO PCT % 5   EOS PCT % 0     Results from last 7 days   Lab Units 05/13/25  1252   SODIUM mmol/L 137   POTASSIUM mmol/L 3.8   CHLORIDE mmol/L 84*   CO2 mmol/L >45*   BUN mg/dL 26*   CREATININE mg/dL 0.83   CALCIUM mg/dL 8.9   ALBUMIN g/dL  3.9   TOTAL BILIRUBIN mg/dL 0.45   ALK PHOS U/L 55   ALT U/L 10   AST U/L 18   GLUCOSE RANDOM mg/dL 110         Results from last 7 days   Lab Units 05/13/25  1759   POC GLUCOSE mg/dl 156*     Lab Results   Component Value Date    HGBA1C 6.1 (H) 03/14/2025    HGBA1C 6.5 (H) 08/30/2023    HGBA1C 5.7 (H) 07/20/2023     Results from last 7 days   Lab Units 05/13/25  1529   PROCALCITONIN ng/ml 0.07       Imaging Results Review: I personally reviewed the following image studies in PACS and associated radiology reports: chest xray. My interpretation of the radiology images/reports is: pulm vascular congestion, emphysematous changes noted.      Administrative Statements       ** Please Note: This note has been constructed using a voice recognition system. **

## 2025-05-14 VITALS
TEMPERATURE: 98.8 F | BODY MASS INDEX: 17.44 KG/M2 | HEART RATE: 89 BPM | HEIGHT: 62 IN | OXYGEN SATURATION: 97 % | WEIGHT: 94.8 LBS | RESPIRATION RATE: 16 BRPM | DIASTOLIC BLOOD PRESSURE: 67 MMHG | SYSTOLIC BLOOD PRESSURE: 117 MMHG

## 2025-05-14 LAB
BUN SERPL-MCNC: 21 MG/DL (ref 5–25)
CALCIUM SERPL-MCNC: 8.6 MG/DL (ref 8.4–10.2)
CHLORIDE SERPL-SCNC: 87 MMOL/L (ref 96–108)
CO2 SERPL-SCNC: >45 MMOL/L (ref 21–32)
CREAT SERPL-MCNC: 0.76 MG/DL (ref 0.6–1.3)
ERYTHROCYTE [DISTWIDTH] IN BLOOD BY AUTOMATED COUNT: 12.8 % (ref 11.6–15.1)
FLUAV RNA RESP QL NAA+PROBE: NEGATIVE
FLUBV RNA RESP QL NAA+PROBE: NEGATIVE
GFR SERPL CREATININE-BSD FRML MDRD: 93 ML/MIN/1.73SQ M
GLUCOSE P FAST SERPL-MCNC: 81 MG/DL (ref 65–99)
GLUCOSE SERPL-MCNC: 100 MG/DL (ref 65–140)
GLUCOSE SERPL-MCNC: 182 MG/DL (ref 65–140)
GLUCOSE SERPL-MCNC: 81 MG/DL (ref 65–140)
HCT VFR BLD AUTO: 32.3 % (ref 36.5–49.3)
HGB BLD-MCNC: 9.5 G/DL (ref 12–17)
MCH RBC QN AUTO: 30.8 PG (ref 26.8–34.3)
MCHC RBC AUTO-ENTMCNC: 29.4 G/DL (ref 31.4–37.4)
MCV RBC AUTO: 105 FL (ref 82–98)
PLATELET # BLD AUTO: 248 THOUSANDS/UL (ref 149–390)
PMV BLD AUTO: 9.7 FL (ref 8.9–12.7)
POTASSIUM SERPL-SCNC: 3.5 MMOL/L (ref 3.5–5.3)
PROCALCITONIN SERPL-MCNC: 0.08 NG/ML
RBC # BLD AUTO: 3.08 MILLION/UL (ref 3.88–5.62)
RSV RNA RESP QL NAA+PROBE: NEGATIVE
SARS-COV-2 RNA RESP QL NAA+PROBE: NEGATIVE
SODIUM SERPL-SCNC: 139 MMOL/L (ref 135–147)
WBC # BLD AUTO: 7.41 THOUSAND/UL (ref 4.31–10.16)

## 2025-05-14 PROCEDURE — 94760 N-INVAS EAR/PLS OXIMETRY 1: CPT

## 2025-05-14 PROCEDURE — 94640 AIRWAY INHALATION TREATMENT: CPT

## 2025-05-14 PROCEDURE — 94664 DEMO&/EVAL PT USE INHALER: CPT

## 2025-05-14 PROCEDURE — 85027 COMPLETE CBC AUTOMATED: CPT | Performed by: STUDENT IN AN ORGANIZED HEALTH CARE EDUCATION/TRAINING PROGRAM

## 2025-05-14 PROCEDURE — 0241U HB NFCT DS VIR RESP RNA 4 TRGT: CPT | Performed by: STUDENT IN AN ORGANIZED HEALTH CARE EDUCATION/TRAINING PROGRAM

## 2025-05-14 PROCEDURE — 82948 REAGENT STRIP/BLOOD GLUCOSE: CPT

## 2025-05-14 PROCEDURE — 80048 BASIC METABOLIC PNL TOTAL CA: CPT | Performed by: STUDENT IN AN ORGANIZED HEALTH CARE EDUCATION/TRAINING PROGRAM

## 2025-05-14 PROCEDURE — 84145 PROCALCITONIN (PCT): CPT | Performed by: STUDENT IN AN ORGANIZED HEALTH CARE EDUCATION/TRAINING PROGRAM

## 2025-05-14 PROCEDURE — 99239 HOSP IP/OBS DSCHRG MGMT >30: CPT | Performed by: PHYSICIAN ASSISTANT

## 2025-05-14 RX ORDER — TORSEMIDE 20 MG/1
40 TABLET ORAL DAILY
Status: DISCONTINUED | OUTPATIENT
Start: 2025-05-14 | End: 2025-05-14 | Stop reason: HOSPADM

## 2025-05-14 RX ADMIN — INSULIN LISPRO 1 UNITS: 100 INJECTION, SOLUTION INTRAVENOUS; SUBCUTANEOUS at 12:09

## 2025-05-14 RX ADMIN — IPRATROPIUM BROMIDE 0.5 MG: 0.5 SOLUTION RESPIRATORY (INHALATION) at 07:17

## 2025-05-14 RX ADMIN — HEPARIN SODIUM 5000 UNITS: 5000 INJECTION INTRAVENOUS; SUBCUTANEOUS at 06:40

## 2025-05-14 RX ADMIN — LEVALBUTEROL HYDROCHLORIDE 1.25 MG: 1.25 SOLUTION RESPIRATORY (INHALATION) at 07:17

## 2025-05-14 RX ADMIN — BUDESONIDE 0.5 MG: 0.5 INHALANT RESPIRATORY (INHALATION) at 07:17

## 2025-05-14 RX ADMIN — TORSEMIDE 40 MG: 20 TABLET ORAL at 09:19

## 2025-05-14 RX ADMIN — PREDNISONE 10 MG: 10 TABLET ORAL at 08:02

## 2025-05-14 RX ADMIN — ASPIRIN 81 MG CHEWABLE TABLET 81 MG: 81 TABLET CHEWABLE at 08:01

## 2025-05-14 RX ADMIN — LEVALBUTEROL HYDROCHLORIDE 1.25 MG: 1.25 SOLUTION RESPIRATORY (INHALATION) at 12:58

## 2025-05-14 RX ADMIN — IPRATROPIUM BROMIDE 0.5 MG: 0.5 SOLUTION RESPIRATORY (INHALATION) at 12:56

## 2025-05-14 NOTE — PLAN OF CARE
Problem: Potential for Falls  Goal: Patient will remain free of falls  Description: INTERVENTIONS:- Educate patient/family on patient safety including physical limitations- Instruct patient to call for assistance with activity - Consult OT/PT to assist with strengthening/mobility - Keep Call bell within reach- Keep bed low and locked with side rails adjusted as appropriate- Keep care items and personal belongings within reach- Initiate and maintain comfort rounds- Make Fall Risk Sign visible to staff- Offer Toileting every  Hours, in advance of need- Initiate/Maintain alarm- Obtain necessary fall risk management equipment: - Apply yellow socks and bracelet for high fall risk patients- Consider moving patient to room near nurses station  INTERVENTIONS:- Educate patient/family on patient safety including physical limitations- Instruct patient to call for assistance with activity - Consult OT/PT to assist with strengthening/mobility - Keep Call bell within reach- Keep bed low and locked with side rails adjusted as appropriate- Keep care items and personal belongings within reach- Initiate and maintain comfort rounds- Make Fall Risk Sign visible to staff- Offer Toileting every  Hours, in advance of need- Initiate/Maintain alarm- Obtain necessary fall risk management equipment: - Apply yellow socks and bracelet for high fall risk patients- Consider moving patient to room near nurses station  Outcome: Progressing     Problem: PAIN - ADULT  Goal: Verbalizes/displays adequate comfort level or baseline comfort level  Description: Interventions:- Encourage patient to monitor pain and request assistance- Assess pain using appropriate pain scale- Administer analgesics based on type and severity of pain and evaluate response- Implement non-pharmacological measures as appropriate and evaluate response- Consider cultural and social influences on pain and pain management- Notify physician/advanced practitioner if interventions  unsuccessful or patient reports new pain  Outcome: Progressing     Problem: INFECTION - ADULT  Goal: Absence or prevention of progression during hospitalization  Description: INTERVENTIONS:- Assess and monitor for signs and symptoms of infection- Monitor lab/diagnostic results- Monitor all insertion sites, i.e. indwelling lines, tubes, and drains- Monitor endotracheal if appropriate and nasal secretions for changes in amount and color- [place] appropriate cooling/warming therapies per order- Administer medications as ordered- Instruct and encourage patient and family to use good hand hygiene technique- Identify and instruct in appropriate isolation precautions for identified infection/condition  Outcome: Progressing  Goal: Absence of fever/infection during neutropenic period  Description: INTERVENTIONS:- Monitor WBC  Outcome: Progressing     Problem: SAFETY ADULT  Goal: Patient will remain free of falls  Description: INTERVENTIONS:- Educate patient/family on patient safety including physical limitations- Instruct patient to call for assistance with activity - Consult OT/PT to assist with strengthening/mobility - Keep Call bell within reach- Keep bed low and locked with side rails adjusted as appropriate- Keep care items and personal belongings within reach- Initiate and maintain comfort rounds- Make Fall Risk Sign visible to staff- Offer Toileting every  Hours, in advance of need- Initiate/Maintain alarm- Obtain necessary fall risk management equipment:- Apply yellow socks and bracelet for high fall risk patients- Consider moving patient to room near nurses station  INTERVENTIONS:- Educate patient/family on patient safety including physical limitations- Instruct patient to call for assistance with activity - Consult OT/PT to assist with strengthening/mobility - Keep Call bell within reach- Keep bed low and locked with side rails adjusted as appropriate- Keep care items and personal belongings within reach-  Initiate and maintain comfort rounds- Make Fall Risk Sign visible to staff- Offer Toileting every  Hours, in advance of need- Initiate/Maintain alarm- Obtain necessary fall risk management equipment: - Apply yellow socks and bracelet for high fall risk patients- Consider moving patient to room near nurses station  Outcome: Progressing  Goal: Maintain or return to baseline ADL function  Description: INTERVENTIONS:-  Assess patient's ability to carry out ADLs; assess patient's baseline for ADL function and identify physical deficits which impact ability to perform ADLs (bathing, care of mouth/teeth, toileting, grooming, dressing, etc.)- Assess/evaluate cause of self-care deficits - Assess range of motion- Assess patient's mobility; develop plan if impaired- Assess patient's need for assistive devices and provide as appropriate- Encourage maximum independence but intervene and supervise when necessary- Involve family in performance of ADLs- Assess for home care needs following discharge - Consider OT consult to assist with ADL evaluation and planning for discharge- Provide patient education as appropriate  Outcome: Progressing  Goal: Maintains/Returns to pre admission functional level  Description: INTERVENTIONS:- Perform AM-PAC 6 Click Basic Mobility/ Daily Activity assessment daily.- Set and communicate daily mobility goal to care team and patient/family/caregiver. - Collaborate with rehabilitation services on mobility goals if consulted- Perform Range of Motion  times a day.- Reposition patient every  hours.- Dangle patient  times a day- Stand patient  times a day- Ambulate patient  times a day- Out of bed to chair  times a day - Out of bed for meals  times a day- Out of bed for toileting- Record patient progress and toleration of activity level   Outcome: Progressing

## 2025-05-14 NOTE — ASSESSMENT & PLAN NOTE
Lab Results   Component Value Date    HGBA1C 6.1 (H) 03/14/2025     Recent Labs     05/13/25  1759 05/14/25  0707   POCGLU 156* 100     Blood Sugar Average: Last 72 hrs:  (P) 128  Continue ISS and carb controlled diet

## 2025-05-14 NOTE — ASSESSMENT & PLAN NOTE
Patient with end-stage COPD, has discussed with palliative care and multiple providers in the past.  He wishes to be full code at this time

## 2025-05-14 NOTE — ASSESSMENT & PLAN NOTE
Remains on 4 L O2 via nasal cannula which is his baseline oxygen need  Continue home medications including DuoNeb, Pulmicort, albuterol, prednisone 10 mg p.o. daily  Emphysematous changes noted on x-ray, possible left upper lobe infiltrate, Pro-Jorge Alberto negative x 2

## 2025-05-14 NOTE — DISCHARGE SUMMARY
Discharge Summary - Hospitalist   Name: Natalio Hartmann Jr. 70 y.o. male I MRN: [id number]  Unit[facility]#: [facility] I Date of Admission: 5/13/2025   Date of Service: 5/14/2025 I Hospital Day: 0     Assessment & Plan  Nonischemic cardiomyopathy (HCC)  Patient presents with some mild volume overload in the setting of dietary indiscretion.  Known nonischemic cardiomyopathy with LVEF 25% and GIDD in 3/16/25  Presented to the ED for increased swelling of the feet.  Remains on his baseline oxygen but does endorse some mild dyspnea  He took an additional dose of torsemide in the outpatient setting with no improvement in swelling  Patient reports that he is no longer following a low-salt diet.  Encouraged decreasing his sodium intake.  , appears lower than patient's baseline  Flu/COVID/RSV negative.  Pro-Jorge Alberto negative x 2  X-ray with mild pulmonary vascular congestion  Improved with 2 doses of Lasix 40 mg IV.  Placed back on Demadex 40 mg daily.  Outpatient cardiology follow-up scheduled for 5/21  Obstructive sleep apnea  Uses trilogy vent at night, BiPAP at bedtime will be ordered  Goals of care, counseling/discussion  Patient with end-stage COPD, has discussed with palliative care and multiple providers in the past.  He wishes to be full code at this time  Chronic anemia  Hemoglobin 10.2 appears within baseline, no acute bleed suspected  Chronic hypercapnia/KEVIN  Continue chronic oxygen at his home 4 L.  BiPAP nightly ordered  End stage COPD (HCC)  Remains on 4 L O2 via nasal cannula which is his baseline oxygen need  Continue home medications including DuoNeb, Pulmicort, albuterol, prednisone 10 mg p.o. daily  Emphysematous changes noted on x-ray, possible left upper lobe infiltrate, Pro-Jorge Alberto negative x 2  Type 2 diabetes mellitus without complication, with long-term current use of insulin (HCC)  Lab Results   Component Value Date    HGBA1C 6.1 (H) 03/14/2025     Recent Labs     05/13/25  1759 05/14/25  0707   POCGLU 156*  "100     Blood Sugar Average: Last 72 hrs:  (P) 128  Continue ISS and carb controlled diet     Medical Problems       Resolved Problems  Date Reviewed: 4/11/2025   None       Discharging Physician / Practitioner: Orquidea Sykes PA-C  PCP: Fatmata Gustafson DO  Admission Date:   Admission Orders (From admission, onward)       Ordered        05/13/25 1629  Place in Observation  Once                          Discharge Date: 05/14/25    Consultations During Hospital Stay:  none    Procedures Performed:     CXR  Emphysematous changes. Mild pulmonary vascular congestion. No focal airspace disease in the left upper lobe may be due to focal edema versus pneumonia.     Significant Findings / Test Results:   See above    Incidental Findings:   none     Test Results Pending at Discharge (will require follow up):   none     Outpatient Tests Requested:  none    Complications:  none    Reason for Admission: edema    Hospital Course:   Natalio Hartmann Jr. is a 70 y.o. male patient who originally presented to the hospital on 5/13/2025 due to increased edema.  He took an extra torsemide at home with no relief.  Patient also endorsed some mild dyspnea.  Patient found to have mild volume overload in the setting of dietary indiscretion and increased salt intake.  Volume overload resolved with Lasix 40 mg IV x 2 doses.  Discussed salt restriction at length with patient.  Arranged for outpatient cardiology follow-up on 5/21.  Patient discharged home in stable condition.    Please see above list of diagnoses and related plan for additional information.     Condition at Discharge: good    Discharge Day Visit / Exam:   Subjective:  Feels better edema resolved  Vitals: Blood Pressure: 100/60 (05/14/25 0758)  Pulse: 82 (05/14/25 0758)  Temperature: 97.9 °F (36.6 °C) (05/14/25 0711)  Temp Source: Oral (05/14/25 0711)  Respirations: 18 (05/14/25 0711)  Height: 5' 2\" (157.5 cm) (05/13/25 3276)  Weight - Scale: 43 kg (94 lb 12.8 oz) (05/13/25 " 2236)  SpO2: 98 % (05/14/25 0800)  Physical Exam  Constitutional:       General: He is not in acute distress.     Appearance: He is well-developed.      Comments: thin   HENT:      Head: Normocephalic and atraumatic.     Cardiovascular:      Rate and Rhythm: Normal rate and regular rhythm.      Heart sounds: No murmur heard.  Pulmonary:      Effort: Pulmonary effort is normal. No respiratory distress.      Breath sounds: No wheezing or rales.      Comments: Decreased breath sounds bilaterally  Abdominal:      General: Bowel sounds are normal. There is no distension.      Palpations: Abdomen is soft.     Musculoskeletal:      Cervical back: Normal range of motion and neck supple.     Skin:     General: Skin is warm and dry.      Findings: No rash.     Neurological:      Mental Status: He is alert and oriented to person, place, and time.      Cranial Nerves: No cranial nerve deficit.          Discussion with Family: Updated  (wife) via phone.    Discharge instructions/Information to patient and family:   See after visit summary for information provided to patient and family.      Provisions for Follow-Up Care:  See after visit summary for information related to follow-up care and any pertinent home health orders.      Mobility at time of Discharge:   Basic Mobility Inpatient Raw Score: 17  JH-HLM Goal: 5: Stand one or more mins  JH-HLM Achieved: 5: Stand (1 or more minutes)  HLM Goal achieved. Continue to encourage appropriate mobility.     Disposition:   Home    Planned Readmission: none    Discharge Medications:  See after visit summary for reconciled discharge medications provided to patient and/or family.      Administrative Statements   Discharge Statement:  I have spent a total time of 45 minutes in caring for this patient on the day of the visit/encounter.     **Please Note: This note may have been constructed using a voice recognition system**

## 2025-05-14 NOTE — PLAN OF CARE
Problem: SAFETY ADULT  Goal: Patient will remain free of falls  Description: INTERVENTIONS:- Educate patient/family on patient safety including physical limitations- Instruct patient to call for assistance with activity - Consult OT/PT to assist with strengthening/mobility - Keep Call bell within reach- Keep bed low and locked with side rails adjusted as appropriate- Keep care items and personal belongings within reach- Initiate and maintain comfort rounds- Make Fall Risk Sign visible to staff- Offer Toileting every 2 Hours, in advance of need- Initiate/Maintain bed alarm- Obtain necessary fall risk management equipment: - Apply yellow socks and bracelet for high fall risk patients- Consider moving patient to room near nurses station  INTERVENTIONS:- Educate patient/family on patient safety including physical limitations- Instruct patient to call for assistance with activity - Consult OT/PT to assist with strengthening/mobility - Keep Call bell within reach- Keep bed low and locked with side rails adjusted as appropriate- Keep care items and personal belongings within reach- Initiate and maintain comfort rounds- Make Fall Risk Sign visible to staff- - Apply yellow socks and bracelet for high fall risk patients- Consider moving patient to room near nurses station  Outcome: Progressing     Problem: Knowledge Deficit  Goal: Patient/family/caregiver demonstrates understanding of disease process, treatment plan, medications, and discharge instructions  Description: Complete learning assessment and assess knowledge base.Interventions:- Provide teaching at level of understanding- Provide teaching via preferred learning methods  Outcome: Progressing

## 2025-05-14 NOTE — ASSESSMENT & PLAN NOTE
Patient presents with some mild volume overload in the setting of dietary indiscretion.  Known nonischemic cardiomyopathy with LVEF 25% and GIDD in 3/16/25  Presented to the ED for increased swelling of the feet.  Remains on his baseline oxygen but does endorse some mild dyspnea  He took an additional dose of torsemide in the outpatient setting with no improvement in swelling  Patient reports that he is no longer following a low-salt diet.  Encouraged decreasing his sodium intake.  , appears lower than patient's baseline  Flu/COVID/RSV negative.  Pro-Jorge Alberto negative x 2  X-ray with mild pulmonary vascular congestion  Improved with 2 doses of Lasix 40 mg IV.  Placed back on Demadex 40 mg daily.  Outpatient cardiology follow-up scheduled for 5/21

## 2025-05-15 ENCOUNTER — TRANSITIONAL CARE MANAGEMENT (OUTPATIENT)
Age: 70
End: 2025-05-15

## 2025-05-27 DIAGNOSIS — J44.9 END STAGE COPD (HCC): ICD-10-CM

## 2025-05-27 NOTE — TELEPHONE ENCOUNTER
Medication : budesonide  and ipratropium -albuterol     Pharmacy - [facility] #[id number]

## 2025-05-28 RX ORDER — IPRATROPIUM BROMIDE AND ALBUTEROL SULFATE 2.5; .5 MG/3ML; MG/3ML
3 SOLUTION RESPIRATORY (INHALATION) 4 TIMES DAILY
Qty: 360 ML | Refills: 3 | Status: ON HOLD | OUTPATIENT
Start: 2025-05-28 | End: 2025-06-19

## 2025-05-28 NOTE — TELEPHONE ENCOUNTER
Patient's wife called requesting updated on prescription request and appointment . I advised that there has not been an update as of yet and I will address this again to the doctor. Please advise

## 2025-06-02 ENCOUNTER — OFFICE VISIT (OUTPATIENT)
Dept: CARDIOLOGY CLINIC | Facility: CLINIC | Age: 70
End: 2025-06-02
Payer: COMMERCIAL

## 2025-06-02 VITALS — HEART RATE: 95 BPM | OXYGEN SATURATION: 96 % | SYSTOLIC BLOOD PRESSURE: 114 MMHG | DIASTOLIC BLOOD PRESSURE: 60 MMHG

## 2025-06-02 DIAGNOSIS — Z09 HOSPITAL DISCHARGE FOLLOW-UP: Primary | ICD-10-CM

## 2025-06-02 DIAGNOSIS — J44.9 END STAGE COPD (HCC): ICD-10-CM

## 2025-06-02 DIAGNOSIS — I50.22 CHRONIC HFREF (HEART FAILURE WITH REDUCED EJECTION FRACTION) (HCC): ICD-10-CM

## 2025-06-02 PROCEDURE — 99214 OFFICE O/P EST MOD 30 MIN: CPT | Performed by: PHYSICIAN ASSISTANT

## 2025-06-02 RX ORDER — TORSEMIDE 20 MG/1
TABLET ORAL
Start: 2025-06-02 | End: 2025-09-04

## 2025-06-02 NOTE — PROGRESS NOTES
"General Cardiology Hospital Follow-up Visit  Admission Dx: \"Nonischemic cardiomyopathy\"     Natalio Hartmann Jr. 70 y.o. male   MRN: [id number]  Encounter: [id number]    Assessment:  Patient Active Problem List    Diagnosis Date Noted    Pulmonary cachexia due to COPD ([facility])     Severe protein-calorie malnutrition ([facility]) 10/14/2023    History of bladder cancer 07/31/2023    Chronic hypercapnia/KEVIN 07/20/2023    Palliative care by specialist 06/13/2023    Alkalosis 06/12/2023    Malnutrition ([facility]) 06/10/2023    Hyperlipidemia 05/02/2023    Physical deconditioning 02/21/2023    Chronic anemia 02/17/2023    Hyponatremia 02/17/2023    Acute on chronic systolic congestive heart failure ([facility]) 09/12/2022    Pulmonary nodules/lesions, multiple 03/27/2022    Prostate cancer ([facility]) 05/24/2021    BPH with obstruction/lower urinary tract symptoms 04/13/2021    Goals of care, counseling/discussion 02/25/2021    Acute on chronic respiratory failure with hypoxia and hypercapnia ([facility]) 11/19/2020    Other insomnia 02/18/2019    GERD (gastroesophageal reflux disease) 01/05/2017    Nonobstructive atherosclerosis of coronary artery 02/25/2016    Mood disorder ([facility]) 08/18/2015    Prediabetes 02/19/2015    Osteoporosis 10/14/2014    Vitamin D deficiency 10/14/2014    Nonischemic cardiomyopathy ([facility]) 09/26/2014    Left bundle-branch block 08/03/2013    Obstructive sleep apnea 07/29/2013    Iron deficiency anemia secondary to inadequate dietary iron intake 05/17/2024    SIADH (syndrome of inappropriate ADH production) ([facility]) 05/17/2024    Type 2 diabetes mellitus without complication, with long-term current use of insulin ([facility]) 05/17/2024    End stage COPD ([facility]) 02/08/2024     Today's Plan:  Volume up on exam. Advised to increase torsemide to total of 60 mg daily for next 3-4 days.  Will ask office RN to touch base with patient later this week for clinical update. If no improvement, can consider in-office IV Lasix.  RTC in 2 weeks as previously " "scheduled.    Plan:  Chronic HFrEF; LVEF 25%   Etiology: nonischemic...\"Possible dysynchrony from the chronic LBBB.  Despite QRS only being 120ms, the echo shows significant dysynchrony.\"    Weight of ? lbs on 05/14 (day of discharge, no weight documented). Today, weighs ? lbs.    Most recent BMP from 05/14/2025: sodium 139; potassium 3.5; BUN 21; creatinine 0.76; eGFR 93.    Guideline-Directed Medical Therapy:  --Beta Blocker: No, end-stage COPD.   --ARNi / ACEi / ARB: losartan 12.5 mg daily.   --Aldosterone Antagonist: No.   --SGLT2 Inhibitor: No.     Volume Management:  --Diuretic: torsemide 40 mg daily -->60 mg total daily.    Sudden Cardiac Death Risk Reduction:  --LVEF 25%. Full code during 05/2025 admission.   --Candidacy for BiV device: LBBB with  ms.     Advanced Therapies: Not a candidate due to significant lung disease.     End-stage COPD  Palliative care patient  Chronic respiratory failure with hypoxia and hypercapnia   Coronary artery disease, nonobstructive  Hyperlipidemia  Obstructive sleep apnea  History of tobacco abuse  Prediabetes  Chronic hyponatremia    HPI:   Natalio Hartmann Jr. is a 70-year-old man with a PMH as above who presents to the office for hospital follow-up. Follows with Dr. CHELSEA Funez.     \"Presented to the hospital on 5/13/2025 due to increased edema.  He took an extra torsemide at home with no relief.  Patient also endorsed some mild dyspnea.  Patient found to have mild volume overload in the setting of dietary indiscretion and increased salt intake.  Volume overload resolved with Lasix 40 mg IV x 2 doses.  Discussed salt restriction at length with patient.  Arranged for outpatient cardiology follow-up on 5/21.  Patient discharged home in stable condition\"    06/02/2025: Patient presents with his spouse and son for hospital follow-up. Feeling okay today. Continues with ongoing fatigue, decreased appetite, and SHARIF. Patient's wife reports worsening lower extremity swelling and " abdominal distention since returning home. Patient reports robust urinary response to torsemide. Continues to eat frozen/microwavable breakfast bowls as well as eggs, oatmeal, Ensure shakes, and 4-6 bananas a day.    Past Medical History[1]    Review of Systems   Constitutional:  Positive for appetite change and fatigue. Negative for activity change.   Respiratory:  Positive for shortness of breath. Negative for cough and chest tightness.    Cardiovascular:  Positive for leg swelling. Negative for chest pain and palpitations.   Gastrointestinal:  Positive for abdominal distention. Negative for abdominal pain.   Genitourinary:  Negative for decreased urine volume.   Neurological:  Positive for weakness. Negative for dizziness, syncope and light-headedness.   Psychiatric/Behavioral:  Negative for confusion and sleep disturbance. The patient is not nervous/anxious.        Allergies[2]    Current Medications[3]    Social History     Socioeconomic History    Marital status: /Civil Union     Spouse name: Not on file    Number of children: Not on file    Years of education: Not on file    Highest education level: Not on file   Occupational History    Not on file   Tobacco Use    Smoking status: Former     Current packs/day: 0.00     Average packs/day: 2.5 packs/day for 42.0 years (105.0 ttl pk-yrs)     Types: Cigarettes     Start date:      Quit date:      Years since quittin.4    Smokeless tobacco: Former    Tobacco comments:     1 ppd for 37 years,  down to 5 cigs a day, is around second hand smoke   Vaping Use    Vaping status: Never Used   Substance and Sexual Activity    Alcohol use: Not Currently     Comment: rarely    Drug use: No    Sexual activity: Not Currently     Partners: Female   Other Topics Concern    Not on file   Social History Narrative    Daily coffee consumption: 8 or more cups a day        Used to work in [employer].  On disability.     Social Drivers of Health      Financial Resource Strain: Not on file   Food Insecurity: No Food Insecurity (2025)    Nursing - Inadequate Food Risk Classification     Worried About Running Out of Food in the Last Year: Not on file     Ran Out of Food in the Last Year: Not on file     Ran Out of Food in the Last Year: Never true   Transportation Needs: No Transportation Needs (2025)    Nursing - Transportation Risk Classification     Lack of Transportation: Not on file     Lack of Transportation: No   Physical Activity: Not on file   Stress: Not on file   Social Connections: Not on file   Intimate Partner Violence: Unknown (2025)    Nursing IPS     Feels Physically and Emotionally Safe: Not on file     Physically Hurt by Someone: Not on file     Humiliated or Emotionally Abused by Someone: Not on file     Physically Hurt by Someone: No     Hurt or Threatened by Someone: No   Housing Stability: Unknown (2025)    Nursing: Inadequate Housing Risk Classification     Has Housing: Not on file     Worried About Losing Housing: Not on file     Unable to Get Utilities: Not on file     Unable to Pay for Housing in the Last Year: No     Has Housin     Family History[4]    Vitals:   Blood pressure 114/60, pulse 95, SpO2 96%.    Wt Readings from Last 10 Encounters:   25 43 kg (94 lb 12.8 oz)   25 43 kg (94 lb 14.4 oz)   25 44.3 kg (97 lb 11.2 oz)   24 44.9 kg (99 lb)   24 45.2 kg (99 lb 9.6 oz)   24 44.7 kg (98 lb 9.6 oz)   04/15/24 44.4 kg (97 lb 12.8 oz)   24 45 kg (99 lb 4.8 oz)   24 45.4 kg (100 lb)   24 46.7 kg (103 lb)     Vitals:    25 1257   BP: 114/60   BP Location: Right arm   Patient Position: Sitting   Cuff Size: Standard   Pulse: 95   SpO2: 96%       Physical Exam  Vitals reviewed.   Constitutional:       General: He is awake. He is not in acute distress.     Appearance: Normal appearance. He is well-developed. He is cachectic. He is ill-appearing. He is not  diaphoretic.      Interventions: Nasal cannula in place.      Comments: Seated in wheelchair   HENT:      Head: Normocephalic.      Nose: Nose normal.     Cardiovascular:      Rate and Rhythm: Normal rate and regular rhythm. No extrasystoles are present.  Pulmonary:      Effort: Pulmonary effort is normal. No bradypnea.      Breath sounds: Decreased air movement present. Decreased breath sounds and wheezing present.   Abdominal:      General: There is distension.     Musculoskeletal:      Cervical back: Neck supple.      Right lower le+ Pitting Edema (hitting above ankle) present.      Left lower le+ Pitting Edema (hitting above ankle) present.     Skin:     General: Skin is dry.      Coloration: Skin is pale. Skin is not jaundiced.      Comments: Cooler distal lower extremities bilaterally     Neurological:      Mental Status: He is alert and oriented to person, place, and time.     Psychiatric:         Mood and Affect: Mood and affect normal.         Speech: Speech normal.         Behavior: Behavior is cooperative.       Labs & Results:  Lab Results   Component Value Date    WBC 7.41 2025    HGB 9.5 (L) 2025    HCT 32.3 (L) 2025     (H) 2025     2025     Lab Results   Component Value Date    SODIUM 139 2025    K 3.5 2025    CL 87 (L) 2025    CO2 >45 (H) 2025    BUN 21 2025    CREATININE 0.76 2025    GLUC 81 2025    CALCIUM 8.6 2025     Lab Results   Component Value Date    INR 0.84 (L) 2025    INR 0.78 (L) 2023    INR 0.84 2023    PROTIME 11.9 (L) 2025    PROTIME 11.1 (L) 2023    PROTIME 11.7 2023     Lab Results   Component Value Date     (H) 2025      Dottie Medrano PA-C       [1]   Past Medical History:  Diagnosis Date    Acute metabolic encephalopathy 2022    Arthritis     Bladder mass     Cardiomyopathy (HCC)     Chest pain     COPD (chronic obstructive  pulmonary disease) ([facility])     COPD exacerbation ([facility]) 04/28/2023    COVID-19 virus infection 02/23/2023    CPAP (continuous positive airway pressure) dependence     Dependence on respirator (ventilator) status ([facility]) 01/10/2024    Emphysema of lung ([facility])     Hypoxia     nocturnal    Left bundle branch block     Macrocytosis without anemia 05/23/2019    Multiple pulmonary nodules     last assessed: 10/12/16    Osteoarthritis 08/03/2013    Pneumonia     Sleep apnea, obstructive     Smoker     Steroid-induced hyperglycemia 03/30/2023    Weight loss 08/29/2019   [2] No Known Allergies  [3]   Current Outpatient Medications:     albuterol (PROVENTIL HFA,VENTOLIN HFA) 90 mcg/act inhaler, Inhale 2 puffs every 6 (six) hours as needed for wheezing or shortness of breath, Disp: 18 g, Rfl: 11    Aspirin Low Dose 81 MG chewable tablet, CHEW 1 TABLET BY MOUTH DAILY, Disp: 90 tablet, Rfl: 1    budesonide (PULMICORT) 0.5 mg/2 mL nebulizer solution, TAKE 2 ML (0.5 MG TOTAL) BY NEBULIZATION TWICE A DAY RINSE MOUTH AFTER USE, Disp: 1080 mL, Rfl: 1    ipratropium-albuterol (DUO-NEB) 0.5-2.5 mg/3 mL nebulizer solution, Take 3 mL by nebulization 4 (four) times a day, Disp: 360 mL, Rfl: 3    losartan (COZAAR) 25 mg tablet, TAKE 1/2 TABLET BY MOUTH EVERY DAY, Disp: 45 tablet, Rfl: 1    predniSONE 10 mg tablet, Take 1 tablet (10 mg total) by mouth daily, Disp: 30 tablet, Rfl: 6    torsemide (DEMADEX) 20 mg tablet, Take 2 tablets (40 mg total) by mouth daily, Disp: 60 tablet, Rfl: 0  [4]   Family History  Problem Relation Name Age of Onset    Diabetes Mother

## 2025-06-02 NOTE — PATIENT INSTRUCTIONS
Increase torsemide to 60 mg total daily for next 3-4 days.   Will touch base later this week for update on symptoms.     Please weigh yourself every day (after emptying your bladder) and keep a detailed log of weights.   Contact Cardiology at [phone] if you gain 3+ lbs overnight or 5+ lbs in 5-7 days.  Limit daily sodium/salt intake to 2000 mg daily to prevent fluid retention.  Avoid canned foods, fast food/Chinese food, and processed meats (hot dogs, lunch meat, and sausage etc.). Caution with condiments.  Limit fluid intake to 2000 mL or 2 liters (about 60-65 ounces) daily.  Avoid electrolyte replacement drinks (such as Gatorade, Pedialyte, Propel, Liquid IV, etc.).  Bring complete list of medications and log of daily weights to your follow-up appointment.

## 2025-06-05 ENCOUNTER — TELEPHONE (OUTPATIENT)
Dept: CARDIOLOGY CLINIC | Facility: CLINIC | Age: 70
End: 2025-06-05

## 2025-06-05 NOTE — TELEPHONE ENCOUNTER
Called pt, no answer, LMOM for pt to call back.  We need to know if abd distention, blle edema, sob are improving since increase in torsemide.  If not we need to bring him in for IV lasix.

## 2025-06-13 ENCOUNTER — OFFICE VISIT (OUTPATIENT)
Dept: PULMONOLOGY | Facility: CLINIC | Age: 70
End: 2025-06-13
Payer: COMMERCIAL

## 2025-06-13 VITALS
OXYGEN SATURATION: 91 % | RESPIRATION RATE: 12 BRPM | DIASTOLIC BLOOD PRESSURE: 52 MMHG | SYSTOLIC BLOOD PRESSURE: 112 MMHG | TEMPERATURE: 97.6 F | HEART RATE: 91 BPM

## 2025-06-13 DIAGNOSIS — Z71.89 GOALS OF CARE, COUNSELING/DISCUSSION: ICD-10-CM

## 2025-06-13 DIAGNOSIS — R64 PULMONARY CACHEXIA DUE TO COPD (HCC): Primary | ICD-10-CM

## 2025-06-13 DIAGNOSIS — J44.9 END STAGE COPD (HCC): ICD-10-CM

## 2025-06-13 DIAGNOSIS — J96.11 CHRONIC RESPIRATORY FAILURE WITH HYPOXIA AND HYPERCAPNIA (HCC): ICD-10-CM

## 2025-06-13 DIAGNOSIS — J44.9 PULMONARY CACHEXIA DUE TO COPD (HCC): Primary | ICD-10-CM

## 2025-06-13 DIAGNOSIS — J96.12 CHRONIC RESPIRATORY FAILURE WITH HYPOXIA AND HYPERCAPNIA (HCC): ICD-10-CM

## 2025-06-13 PROBLEM — J96.22 ACUTE ON CHRONIC RESPIRATORY FAILURE WITH HYPOXIA AND HYPERCAPNIA (HCC): Status: RESOLVED | Noted: 2020-11-19 | Resolved: 2025-06-13

## 2025-06-13 PROBLEM — J96.21 ACUTE ON CHRONIC RESPIRATORY FAILURE WITH HYPOXIA AND HYPERCAPNIA (HCC): Status: RESOLVED | Noted: 2020-11-19 | Resolved: 2025-06-13

## 2025-06-13 PROCEDURE — 99215 OFFICE O/P EST HI 40 MIN: CPT | Performed by: INTERNAL MEDICINE

## 2025-06-13 NOTE — ASSESSMENT & PLAN NOTE
He maintains on supplemental oxygen 24/7.  He has trilogy noninvasive ventilator to use at night.  He does need to require this sometimes during the day.

## 2025-06-13 NOTE — PROGRESS NOTES
"Follow-up  Visit - Pulmonary Medicine   Name: Natalio Hartmann Jr.      : 1955      MRN: [id number]  Encounter Provider: Malia Luque DO  Encounter Date: 2025   Encounter department: [facility]  :  Assessment & Plan  Pulmonary cachexia due to COPD (HCC)  Malnutrition Findings:    Cachexia with BMI of 17  He states that unfortunately he just does not have an appetite like he used to  He does protein shakes when able    Chronic respiratory failure with hypoxia and hypercapnia (HCC)  He maintains on supplemental oxygen .  He has trilogy noninvasive ventilator to use at night.  He does need to require this sometimes during the day.  End stage COPD (HCC)  Overall his clinical picture shows ongoing decline.  At this point I would not increase his prednisone as it may result in worsening edema.  He also has poor vision and compression fractures as a result of chronic steroid use.  Hold his wife that during the summer hot humid months, we may need to increase his prednisone if he is having trouble breathing.  He will otherwise remain on all nebulized regimen  He is not a candidate for pulmonary rehab       Goals of care, counseling/discussion  We continue to have ongoing conversations.  He is clearly in the end-stage of COPD.  While he states that his wishes are to let things happen naturally, and that he wants to die at home, he does not want hospice or any help at home.  He states \"just leave me alone \".         No follow-ups on file.    History of Present Illness   Natalio Hartmann Jr. is a 70 y.o. male who presents for follow-up from his most recent hospitalization.  He was hospitalized with volume overload, diuretics were adjusted and he was discharged home.  His wife and son are present.  He has had significant decline in his appetite.  He does not eat nearly as much as he used to.  He does try to use some protein shakes.  He has chronic dyspnea even at rest.  He is frustrated that " his oxygen saturations sometimes drops even when he is just sitting still.  He does almost no activity.  He has severe lower extremity edema.  He has some abdominal distention.  The cardiology team has been working with his diuretics.    Review of Systems   Constitutional:  Positive for activity change, appetite change, fatigue and unexpected weight change.   Eyes:  Positive for visual disturbance.   Respiratory:  Positive for cough, shortness of breath and wheezing.    Cardiovascular:  Positive for leg swelling. Negative for chest pain.   Gastrointestinal:  Positive for diarrhea. Negative for nausea.   Genitourinary:  Positive for frequency.   Musculoskeletal:  Positive for gait problem.   Skin:  Positive for color change.   Neurological:  Positive for weakness.   Psychiatric/Behavioral:  Negative for sleep disturbance.    All other systems reviewed and are negative.    Aside from what is mentioned in the HPI, ROS is otherwise negative    Medical History Reviewed by provider this encounter:     .    Objective   /52 (BP Location: Left arm, Patient Position: Sitting, Cuff Size: Standard)   Pulse 91   Temp 97.6 °F (36.4 °C) (Temporal)   Resp 12   SpO2 91%   PF (!) 4 L/min     Physical Exam    General:  Patient is awake, cachectic, chronically ill-appearing in no acute respiratory distress but with mild-moderate conversational dyspnea  Eyes: no scleral icterus  CV:  Regular, +S1 and S2, No murmurs, gallops or rubs appreciated  Lungs: Greatly diminished breath sounds in all lung fields with few scattered expiratory wheezes  Abdomen: Soft, +BS, Non-tender, +distended  Extremities: No clubbing, cyanosis or edema  Neuro: No focal motor/sensory deficits  Skin: Warm, No rashes or ulcerations      Diagnostic Data:  Radiology results:  Radiology Results Review: I personally reviewed the following image studies in PACS and associated radiology reports: chest xray. My interpretation of the radiology images/reports is:  Hyperinflation, kyphosis, pulmonary edema.      Malia Luque, DO

## 2025-06-13 NOTE — ASSESSMENT & PLAN NOTE
Overall his clinical picture shows ongoing decline.  At this point I would not increase his prednisone as it may result in worsening edema.  He also has poor vision and compression fractures as a result of chronic steroid use.  Hold his wife that during the summer hot humid months, we may need to increase his prednisone if he is having trouble breathing.  He will otherwise remain on all nebulized regimen  He is not a candidate for pulmonary rehab

## 2025-06-13 NOTE — ASSESSMENT & PLAN NOTE
"We continue to have ongoing conversations.  He is clearly in the end-stage of COPD.  While he states that his wishes are to let things happen naturally, and that he wants to die at home, he does not want hospice or any help at home.  He states \"just leave me alone \".       "

## 2025-06-13 NOTE — ASSESSMENT & PLAN NOTE
Malnutrition Findings:    Cachexia with BMI of 17  He states that unfortunately he just does not have an appetite like he used to  He does protein shakes when able

## 2025-06-15 ENCOUNTER — HOSPITAL ENCOUNTER (INPATIENT)
Facility: HOSPITAL | Age: 70
LOS: 4 days | Discharge: HOME | DRG: 189 | End: 2025-06-19
Attending: EMERGENCY MEDICINE | Admitting: INTERNAL MEDICINE
Payer: COMMERCIAL

## 2025-06-15 ENCOUNTER — APPOINTMENT (EMERGENCY)
Dept: RADIOLOGY | Facility: HOSPITAL | Age: 70
DRG: 189 | End: 2025-06-15
Payer: COMMERCIAL

## 2025-06-15 DIAGNOSIS — J44.9 COPD (CHRONIC OBSTRUCTIVE PULMONARY DISEASE) (HCC): ICD-10-CM

## 2025-06-15 DIAGNOSIS — R41.82 ALTERED MENTAL STATUS: ICD-10-CM

## 2025-06-15 DIAGNOSIS — Z71.89 GOALS OF CARE, COUNSELING/DISCUSSION: ICD-10-CM

## 2025-06-15 DIAGNOSIS — R06.89 HYPERCARBIA: Primary | ICD-10-CM

## 2025-06-15 DIAGNOSIS — R06.03 RESPIRATORY DISTRESS: ICD-10-CM

## 2025-06-15 DIAGNOSIS — J44.9 END STAGE COPD (HCC): ICD-10-CM

## 2025-06-15 LAB
2HR DELTA HS TROPONIN: -5 NG/L
4HR DELTA HS TROPONIN: -7 NG/L
ALBUMIN SERPL BCG-MCNC: 4.3 G/DL (ref 3.5–5)
ALP SERPL-CCNC: 70 U/L (ref 34–104)
ALT SERPL W P-5'-P-CCNC: 14 U/L (ref 7–52)
AST SERPL W P-5'-P-CCNC: 24 U/L (ref 13–39)
ATRIAL RATE: 82 BPM
BASE EX.OXY STD BLDV CALC-SCNC: 38.3 % (ref 60–80)
BASE EX.OXY STD BLDV CALC-SCNC: 49.9 % (ref 60–80)
BASE EXCESS BLDV CALC-SCNC: 21.4 MMOL/L
BASE EXCESS BLDV CALC-SCNC: 24.9 MMOL/L
BASOPHILS # BLD MANUAL: 0 THOUSAND/UL (ref 0–0.1)
BASOPHILS NFR MAR MANUAL: 0 % (ref 0–1)
BILIRUB SERPL-MCNC: 0.54 MG/DL (ref 0.2–1)
BUN SERPL-MCNC: 25 MG/DL (ref 5–25)
CALCIUM SERPL-MCNC: 9.1 MG/DL (ref 8.4–10.2)
CARDIAC TROPONIN I PNL SERPL HS: 38 NG/L (ref ?–50)
CARDIAC TROPONIN I PNL SERPL HS: 40 NG/L (ref ?–50)
CARDIAC TROPONIN I PNL SERPL HS: 45 NG/L (ref ?–50)
CHLORIDE SERPL-SCNC: 80 MMOL/L (ref 96–108)
CO2 SERPL-SCNC: >45 MMOL/L (ref 21–32)
CREAT SERPL-MCNC: 0.87 MG/DL (ref 0.6–1.3)
EOSINOPHIL # BLD MANUAL: 0 THOUSAND/UL (ref 0–0.4)
EOSINOPHIL NFR BLD MANUAL: 0 % (ref 0–6)
ERYTHROCYTE [DISTWIDTH] IN BLOOD BY AUTOMATED COUNT: 12 % (ref 11.6–15.1)
GFR SERPL CREATININE-BSD FRML MDRD: 88 ML/MIN/1.73SQ M
GLUCOSE SERPL-MCNC: 113 MG/DL (ref 65–140)
HCO3 BLDV-SCNC: 53.5 MMOL/L (ref 24–30)
HCO3 BLDV-SCNC: 58.5 MMOL/L (ref 24–30)
HCT VFR BLD AUTO: 39.1 % (ref 36.5–49.3)
HGB BLD-MCNC: 11.6 G/DL (ref 12–17)
LYMPHOCYTES # BLD AUTO: 0.22 THOUSAND/UL (ref 0.6–4.47)
LYMPHOCYTES # BLD AUTO: 2 % (ref 14–44)
MACROCYTES BLD QL AUTO: PRESENT
MCH RBC QN AUTO: 30.9 PG (ref 26.8–34.3)
MCHC RBC AUTO-ENTMCNC: 29.7 G/DL (ref 31.4–37.4)
MCV RBC AUTO: 104 FL (ref 82–98)
MONOCYTES # BLD AUTO: 0.43 THOUSAND/UL (ref 0–1.22)
MONOCYTES NFR BLD: 4 % (ref 4–12)
NEUTROPHILS # BLD MANUAL: 10.22 THOUSAND/UL (ref 1.85–7.62)
NEUTS SEG NFR BLD AUTO: 94 % (ref 43–75)
O2 CT BLDV-SCNC: 6.1 ML/DL
O2 CT BLDV-SCNC: 8.5 ML/DL
OVALOCYTES BLD QL SMEAR: PRESENT
P AXIS: 84 DEGREES
PCO2 BLDV: 121.2 MM HG (ref 42–50)
PCO2 BLDV: 137.2 MM HG (ref 42–50)
PH BLDV: 7.25 [PH] (ref 7.3–7.4)
PH BLDV: 7.26 [PH] (ref 7.3–7.4)
PLATELET # BLD AUTO: 246 THOUSANDS/UL (ref 149–390)
PLATELET BLD QL SMEAR: ADEQUATE
PLATELET CLUMP BLD QL SMEAR: PRESENT
PMV BLD AUTO: 9.4 FL (ref 8.9–12.7)
PO2 BLDV: 25.5 MM HG (ref 35–45)
PO2 BLDV: 30.9 MM HG (ref 35–45)
POIKILOCYTOSIS BLD QL SMEAR: PRESENT
POTASSIUM SERPL-SCNC: 3.9 MMOL/L (ref 3.5–5.3)
PR INTERVAL: 168 MS
PROT SERPL-MCNC: 7.1 G/DL (ref 6.4–8.4)
QRS AXIS: 84 DEGREES
QRSD INTERVAL: 134 MS
QT INTERVAL: 394 MS
QTC INTERVAL: 460 MS
RBC # BLD AUTO: 3.76 MILLION/UL (ref 3.88–5.62)
RBC MORPH BLD: PRESENT
SODIUM SERPL-SCNC: 137 MMOL/L (ref 135–147)
T WAVE AXIS: -65 DEGREES
VENTRICULAR RATE: 82 BPM
WBC # BLD AUTO: 10.87 THOUSAND/UL (ref 4.31–10.16)

## 2025-06-15 PROCEDURE — 94644 CONT INHLJ TX 1ST HOUR: CPT

## 2025-06-15 PROCEDURE — 99291 CRITICAL CARE FIRST HOUR: CPT | Performed by: EMERGENCY MEDICINE

## 2025-06-15 PROCEDURE — 82805 BLOOD GASES W/O2 SATURATION: CPT

## 2025-06-15 PROCEDURE — 94640 AIRWAY INHALATION TREATMENT: CPT

## 2025-06-15 PROCEDURE — 96360 HYDRATION IV INFUSION INIT: CPT

## 2025-06-15 PROCEDURE — 93005 ELECTROCARDIOGRAM TRACING: CPT

## 2025-06-15 PROCEDURE — 71045 X-RAY EXAM CHEST 1 VIEW: CPT

## 2025-06-15 PROCEDURE — 85007 BL SMEAR W/DIFF WBC COUNT: CPT

## 2025-06-15 PROCEDURE — 80053 COMPREHEN METABOLIC PANEL: CPT

## 2025-06-15 PROCEDURE — 94002 VENT MGMT INPAT INIT DAY: CPT

## 2025-06-15 PROCEDURE — 36415 COLL VENOUS BLD VENIPUNCTURE: CPT

## 2025-06-15 PROCEDURE — 84484 ASSAY OF TROPONIN QUANT: CPT

## 2025-06-15 PROCEDURE — 93010 ELECTROCARDIOGRAM REPORT: CPT | Performed by: INTERNAL MEDICINE

## 2025-06-15 PROCEDURE — 99285 EMERGENCY DEPT VISIT HI MDM: CPT

## 2025-06-15 PROCEDURE — 85027 COMPLETE CBC AUTOMATED: CPT

## 2025-06-15 PROCEDURE — 94760 N-INVAS EAR/PLS OXIMETRY 1: CPT

## 2025-06-15 RX ORDER — ALBUTEROL SULFATE 5 MG/ML
10 SOLUTION RESPIRATORY (INHALATION) ONCE
Status: COMPLETED | OUTPATIENT
Start: 2025-06-15 | End: 2025-06-15

## 2025-06-15 RX ORDER — SODIUM CHLORIDE FOR INHALATION 0.9 %
12 VIAL, NEBULIZER (ML) INHALATION ONCE
Status: COMPLETED | OUTPATIENT
Start: 2025-06-15 | End: 2025-06-15

## 2025-06-15 RX ORDER — BUDESONIDE 0.5 MG/2ML
0.5 INHALANT ORAL
Status: DISCONTINUED | OUTPATIENT
Start: 2025-06-16 | End: 2025-06-20 | Stop reason: HOSPADM

## 2025-06-15 RX ORDER — METHYLPREDNISOLONE SODIUM SUCCINATE 40 MG/ML
40 INJECTION, POWDER, LYOPHILIZED, FOR SOLUTION INTRAMUSCULAR; INTRAVENOUS EVERY 12 HOURS SCHEDULED
Status: COMPLETED | OUTPATIENT
Start: 2025-06-15 | End: 2025-06-17

## 2025-06-15 RX ORDER — LEVALBUTEROL INHALATION SOLUTION 1.25 MG/3ML
1.25 SOLUTION RESPIRATORY (INHALATION)
Status: DISCONTINUED | OUTPATIENT
Start: 2025-06-16 | End: 2025-06-20 | Stop reason: HOSPADM

## 2025-06-15 RX ORDER — ASPIRIN 81 MG/1
81 TABLET, CHEWABLE ORAL DAILY
Status: DISCONTINUED | OUTPATIENT
Start: 2025-06-16 | End: 2025-06-20 | Stop reason: HOSPADM

## 2025-06-15 RX ADMIN — METHYLPREDNISOLONE SODIUM SUCCINATE 40 MG: 40 INJECTION, POWDER, FOR SOLUTION INTRAMUSCULAR; INTRAVENOUS at 23:06

## 2025-06-15 RX ADMIN — AZITHROMYCIN MONOHYDRATE 500 MG: 500 INJECTION, POWDER, LYOPHILIZED, FOR SOLUTION INTRAVENOUS at 23:08

## 2025-06-15 RX ADMIN — ISODIUM CHLORIDE 12 ML: 0.03 SOLUTION RESPIRATORY (INHALATION) at 19:41

## 2025-06-15 RX ADMIN — SODIUM CHLORIDE 250 ML: 0.9 INJECTION, SOLUTION INTRAVENOUS at 20:22

## 2025-06-15 RX ADMIN — ALBUTEROL SULFATE 10 MG: 2.5 SOLUTION RESPIRATORY (INHALATION) at 19:41

## 2025-06-15 RX ADMIN — IPRATROPIUM BROMIDE 1 MG: 0.5 SOLUTION RESPIRATORY (INHALATION) at 19:41

## 2025-06-16 PROBLEM — J96.22 ACUTE ON CHRONIC RESPIRATORY FAILURE WITH HYPOXIA AND HYPERCAPNIA (HCC): Status: ACTIVE | Noted: 2025-06-16

## 2025-06-16 PROBLEM — J96.21 ACUTE ON CHRONIC RESPIRATORY FAILURE WITH HYPOXIA AND HYPERCAPNIA (HCC): Status: ACTIVE | Noted: 2025-06-16

## 2025-06-16 PROBLEM — I95.9 HYPOTENSION: Status: ACTIVE | Noted: 2025-06-16

## 2025-06-16 LAB
ALBUMIN SERPL BCG-MCNC: 3.6 G/DL (ref 3.5–5)
ALP SERPL-CCNC: 57 U/L (ref 34–104)
ALT SERPL W P-5'-P-CCNC: 11 U/L (ref 7–52)
ANISOCYTOSIS BLD QL SMEAR: PRESENT
APTT PPP: 25 SECONDS (ref 23–34)
AST SERPL W P-5'-P-CCNC: 20 U/L (ref 13–39)
BASE EX.OXY STD BLDV CALC-SCNC: 40.3 % (ref 60–80)
BASE EX.OXY STD BLDV CALC-SCNC: 61.7 % (ref 60–80)
BASE EXCESS BLDV CALC-SCNC: 20.6 MMOL/L
BASE EXCESS BLDV CALC-SCNC: 24.8 MMOL/L
BASOPHILS # BLD MANUAL: 0 THOUSAND/UL (ref 0–0.1)
BASOPHILS NFR MAR MANUAL: 0 % (ref 0–1)
BILIRUB SERPL-MCNC: 0.59 MG/DL (ref 0.2–1)
BUN SERPL-MCNC: 25 MG/DL (ref 5–25)
CALCIUM SERPL-MCNC: 8.4 MG/DL (ref 8.4–10.2)
CHLORIDE SERPL-SCNC: 86 MMOL/L (ref 96–108)
CO2 SERPL-SCNC: >45 MMOL/L (ref 21–32)
CREAT SERPL-MCNC: 0.76 MG/DL (ref 0.6–1.3)
EOSINOPHIL # BLD MANUAL: 0 THOUSAND/UL (ref 0–0.4)
EOSINOPHIL NFR BLD MANUAL: 0 % (ref 0–6)
ERYTHROCYTE [DISTWIDTH] IN BLOOD BY AUTOMATED COUNT: 12.1 % (ref 11.6–15.1)
GFR SERPL CREATININE-BSD FRML MDRD: 93 ML/MIN/1.73SQ M
GLUCOSE SERPL-MCNC: 112 MG/DL (ref 65–140)
GLUCOSE SERPL-MCNC: 127 MG/DL (ref 65–140)
GLUCOSE SERPL-MCNC: 135 MG/DL (ref 65–140)
GLUCOSE SERPL-MCNC: 145 MG/DL (ref 65–140)
GLUCOSE SERPL-MCNC: 147 MG/DL (ref 65–140)
HCO3 BLDV-SCNC: 52.6 MMOL/L (ref 24–30)
HCO3 BLDV-SCNC: 55.4 MMOL/L (ref 24–30)
HCT VFR BLD AUTO: 35.1 % (ref 36.5–49.3)
HGB BLD-MCNC: 10.3 G/DL (ref 12–17)
HYPERCHROMIA BLD QL SMEAR: PRESENT
INR PPP: 0.79 (ref 0.85–1.19)
LYMPHOCYTES # BLD AUTO: 0.27 THOUSAND/UL (ref 0.6–4.47)
LYMPHOCYTES # BLD AUTO: 3 % (ref 14–44)
MAGNESIUM SERPL-MCNC: 2 MG/DL (ref 1.9–2.7)
MCH RBC QN AUTO: 30.4 PG (ref 26.8–34.3)
MCHC RBC AUTO-ENTMCNC: 29.3 G/DL (ref 31.4–37.4)
MCV RBC AUTO: 104 FL (ref 82–98)
MONOCYTES # BLD AUTO: 0 THOUSAND/UL (ref 0–1.22)
MONOCYTES NFR BLD: 0 % (ref 4–12)
NEUTROPHILS # BLD MANUAL: 5.12 THOUSAND/UL (ref 1.85–7.62)
NEUTS SEG NFR BLD AUTO: 95 % (ref 43–75)
O2 CT BLDV-SCNC: 10 ML/DL
O2 CT BLDV-SCNC: 6.3 ML/DL
PCO2 BLDV: 101.8 MM HG (ref 42–50)
PCO2 BLDV: 122.2 MM HG (ref 42–50)
PH BLDV: 7.25 [PH] (ref 7.3–7.4)
PH BLDV: 7.35 [PH] (ref 7.3–7.4)
PLATELET # BLD AUTO: 200 THOUSANDS/UL (ref 149–390)
PLATELET BLD QL SMEAR: ADEQUATE
PMV BLD AUTO: 9.4 FL (ref 8.9–12.7)
PO2 BLDV: 26.6 MM HG (ref 35–45)
PO2 BLDV: 34 MM HG (ref 35–45)
POIKILOCYTOSIS BLD QL SMEAR: PRESENT
POTASSIUM SERPL-SCNC: 4.2 MMOL/L (ref 3.5–5.3)
PROT SERPL-MCNC: 6 G/DL (ref 6.4–8.4)
PROTHROMBIN TIME: 11.3 SECONDS (ref 12.3–15)
RBC # BLD AUTO: 3.39 MILLION/UL (ref 3.88–5.62)
RBC MORPH BLD: PRESENT
SODIUM SERPL-SCNC: 139 MMOL/L (ref 135–147)
VARIANT LYMPHS # BLD AUTO: 2 %
WBC # BLD AUTO: 5.39 THOUSAND/UL (ref 4.31–10.16)

## 2025-06-16 PROCEDURE — 99255 IP/OBS CONSLTJ NEW/EST HI 80: CPT | Performed by: INTERNAL MEDICINE

## 2025-06-16 PROCEDURE — 82805 BLOOD GASES W/O2 SATURATION: CPT | Performed by: INTERNAL MEDICINE

## 2025-06-16 PROCEDURE — 82948 REAGENT STRIP/BLOOD GLUCOSE: CPT

## 2025-06-16 PROCEDURE — 99223 1ST HOSP IP/OBS HIGH 75: CPT | Performed by: INTERNAL MEDICINE

## 2025-06-16 PROCEDURE — 85027 COMPLETE CBC AUTOMATED: CPT | Performed by: INTERNAL MEDICINE

## 2025-06-16 PROCEDURE — 94003 VENT MGMT INPAT SUBQ DAY: CPT

## 2025-06-16 PROCEDURE — 94640 AIRWAY INHALATION TREATMENT: CPT

## 2025-06-16 PROCEDURE — 80053 COMPREHEN METABOLIC PANEL: CPT | Performed by: INTERNAL MEDICINE

## 2025-06-16 PROCEDURE — 85610 PROTHROMBIN TIME: CPT | Performed by: INTERNAL MEDICINE

## 2025-06-16 PROCEDURE — 94760 N-INVAS EAR/PLS OXIMETRY 1: CPT

## 2025-06-16 PROCEDURE — 36415 COLL VENOUS BLD VENIPUNCTURE: CPT | Performed by: INTERNAL MEDICINE

## 2025-06-16 PROCEDURE — 94664 DEMO&/EVAL PT USE INHALER: CPT

## 2025-06-16 PROCEDURE — 85730 THROMBOPLASTIN TIME PARTIAL: CPT | Performed by: INTERNAL MEDICINE

## 2025-06-16 PROCEDURE — 85007 BL SMEAR W/DIFF WBC COUNT: CPT | Performed by: INTERNAL MEDICINE

## 2025-06-16 PROCEDURE — 83735 ASSAY OF MAGNESIUM: CPT | Performed by: INTERNAL MEDICINE

## 2025-06-16 RX ADMIN — AZITHROMYCIN MONOHYDRATE 500 MG: 500 INJECTION, POWDER, LYOPHILIZED, FOR SOLUTION INTRAVENOUS at 22:21

## 2025-06-16 RX ADMIN — BUDESONIDE 0.5 MG: 0.5 INHALANT RESPIRATORY (INHALATION) at 19:38

## 2025-06-16 RX ADMIN — SODIUM CHLORIDE 500 ML: 0.9 INJECTION, SOLUTION INTRAVENOUS at 02:25

## 2025-06-16 RX ADMIN — IPRATROPIUM BROMIDE 0.5 MG: 0.5 SOLUTION RESPIRATORY (INHALATION) at 07:58

## 2025-06-16 RX ADMIN — BUDESONIDE 0.5 MG: 0.5 INHALANT RESPIRATORY (INHALATION) at 07:58

## 2025-06-16 RX ADMIN — LEVALBUTEROL HYDROCHLORIDE 1.25 MG: 1.25 SOLUTION RESPIRATORY (INHALATION) at 13:39

## 2025-06-16 RX ADMIN — METHYLPREDNISOLONE SODIUM SUCCINATE 40 MG: 40 INJECTION, POWDER, FOR SOLUTION INTRAMUSCULAR; INTRAVENOUS at 10:34

## 2025-06-16 RX ADMIN — LEVALBUTEROL HYDROCHLORIDE 1.25 MG: 1.25 SOLUTION RESPIRATORY (INHALATION) at 07:58

## 2025-06-16 RX ADMIN — METHYLPREDNISOLONE SODIUM SUCCINATE 40 MG: 40 INJECTION, POWDER, FOR SOLUTION INTRAMUSCULAR; INTRAVENOUS at 20:20

## 2025-06-16 RX ADMIN — IPRATROPIUM BROMIDE 0.5 MG: 0.5 SOLUTION RESPIRATORY (INHALATION) at 13:39

## 2025-06-16 RX ADMIN — IPRATROPIUM BROMIDE 0.5 MG: 0.5 SOLUTION RESPIRATORY (INHALATION) at 19:38

## 2025-06-16 RX ADMIN — LEVALBUTEROL HYDROCHLORIDE 1.25 MG: 1.25 SOLUTION RESPIRATORY (INHALATION) at 19:38

## 2025-06-16 NOTE — ASSESSMENT & PLAN NOTE
Presented to the ER with respiratory distress and placed on BiPAP. Patient with chronic hypoxic and hypercapnic respiratory failure related primarily to end-stage COPD but also has nonischemic cardiomyopathy with EF 25%; Requires supplemental O2 24/7 as well as BiPAP at night as well as additionally has been needing to use it throughout the day at times.  Extensive conversation was had with the patient and his wife, they are aware that he is at end-stage disease  Patient himself is adamant that he wants to go home  Palliative care following, will meet with the hospice team  Emotional support given to the wife  Continue BiPAP with naps and at night, CO2 rising despite BiPAP  Continue with Solu-Medrol and around-the-clock nebulizers for now

## 2025-06-16 NOTE — ASSESSMENT & PLAN NOTE
BP at baseline appears in the 100-110 systolic   BP overnight in the 90's range   Severe Cachexia   Given 750 cc overnight as opposed to more aggressive IVF given concurrent respiratory failure

## 2025-06-16 NOTE — H&P
H&P - Hospitalist   Name: Natalio Hartmann Jr. 70 y.o. male I MRN: [id number]  Unit/Bed#: QCF I Date of Admission: 6/15/2025   Date of Service: 6/16/2025 I Hospital Day: 1     Assessment & Plan  Acute on chronic respiratory failure with hypoxia and hypercapnia (HCC)  Presented to the ER with respiratory distress and placed on BiPAP  Chest x-ray reviewed with evidence of underlying severe emphysema possible volume overload more noted of the right side on my read awaiting official read   Patient with chronic hypoxic and hypercapnic respiratory failure related primarily to end-stage COPD but also has nonischemic cardiomyopathy with EF 25%; Requires supplemental O2 24/7 as well as BiPAP at night as well as additionally has been needing to use it throughout the day at times  VBG 7.24/137/30/58 initially before BIPAP in the ER.   Extensive discussion with patient's wife as well as patient in ER Room regarding goals of care in setting of end stage COPD who presents with hypercapnic respiratory failure. Medical recommendation of hospice was made given patient's critically ill status. Patient and wife at bedside aware of recommendation. Patient and wife would like to continue bipap overnight despite minimal improvements in gas and my concerns with patient's overall comfort. We did discuss the concerns with further escalation of care to a ventilator/cpr given patient's frail state/end stage copd and patient as well as wife understanding of this. As such both report that patients code status should be DNR/DNI with that limit set.   Patient additionally with soft Bps with SBP in the 90's overnight  Patient given nebulizers, IV Solu-Medrol as well as IV azithromycin and continuous BiPAP overnight with repeat VBG this AM 7.35/101/34/55  Admit to medicine on telemetry stepdown level 2 and continue BiPAP.  Continue scheduled nebulizers, IV Solu-Medrol and IV azithromycin.  Given patient's systolic blood pressure in the 90s, unable to  trial a dose of IV diuretic currently as may additionally have component of CHF but if pressures trend up may trial.  As afebrile, without leukocytosis apart from azithromycin for patient's underlying COPD additional antimicrobials were held for now.  Pulmonary consultation appreciate their assistance  Palliative care consultation for continued goals of care discussion as patient appears in the end stages of life   Nonischemic cardiomyopathy (HCC)  Echo in March 2025 with LVEF 25%  On PTA torsemide 40 mg daily and losartan 12.5 mg daily  Due to patient's low blood pressure at baseline and noted systolic blood pressures in the high 90s, holding PTA torsemide as well as losartan.   Patient may additionally have a component of CHF going on for which IV diuresis was considered but unable currently given SBP in the 90's.     Goals of care, counseling/discussion  Extensive discussion with patient's wife as well as patient in ER Room regarding goals of care in setting of end stage COPD who presents with hypercapnic respiratory failure. Medical recommendation of hospice was made given patient's critically ill status. Patient and wife at bedside aware of recommendation. Patient and wife would like to continue bipap overnight despite minimal improvements in gas and my concerns with patient's overall comfort. We did discuss the concerns with further escalation of care to a ventilator/cpr given patient's frail state/end stage copd and patient as well as wife understanding of this. As such both report that patients code status should be DNR/DNI with that limit set.   Consult palliative care team for further assistance with continued goals of care discussions as patient with end-stage COPD as well as severe cachexia and highly concerned patient is in the end stages of life  Pulmonary cachexia due to COPD (HCC)  Malnutrition Findings: BMI 17, severe cachexia                                BMI Findings:           There is no height  or weight on file to calculate BMI.     End stage COPD (HCC)  Follows with Dr. Luque with pulmonology outpatient and reviewed her note from just 2 days ago which makes this diagnosis quite clear  On supplemental O2 24/7 as well as trilogy noninvasive ventilator at night as well as additionally it appears needing to use this trilogy sometimes during the day  Thought to be main driving force behind his admission for respiratory failure today with possible additional component of CHF  Treating with scheduled nebulizers as well as IV Solu-Medrol and azithromycin  BiPAP continuous  Pulmonary consultation  Hypotension  BP at baseline appears in the 100-110 systolic   BP overnight in the 90's range   Severe Cachexia   Given 750 cc overnight as opposed to more aggressive IVF given concurrent respiratory failure         Code Status: Level 3 - DNAR and DNI Patient's wife at bedside in agreement   Discussion with family: Updated  (wife) at bedside.    Anticipated Length of Stay: Patient will be admitted on an inpatient basis with an anticipated length of stay of greater than 2 midnights secondary to Respiratory Failure, End Stage COPD.    History of Present Illness   Chief Complaint: Shortness of breath    Natalio Hartmann Jr. is a 70 y.o. male with a PMH of chronic hypoxic and hypercapnic respiratory failure, end-stage COPD, nonischemic cardiomyopathy with EF 25% who presented to the ER with respiratory distress.  Please see assessment and plan sections above for details.    Review of Systems   Respiratory:  Positive for shortness of breath.        Historical Information   Past Medical History[1]  Past Surgical History[2]  Social History[3]  E-Cigarette/Vaping    E-Cigarette Use Never User      E-Cigarette/Vaping Substances    Nicotine No     THC No     CBD No     Flavoring No     Other No     Unknown No      Family history non-contributory  Social History:  Marital Status: /Civil Union       Meds/Allergies    I have reviewed home medications using recent Epic encounter.  Prior to Admission medications    Medication Sig Start Date End Date Taking? Authorizing Provider   albuterol (PROVENTIL HFA,VENTOLIN HFA) 90 mcg/act inhaler Inhale 2 puffs every 6 (six) hours as needed for wheezing or shortness of breath 4/22/25   Malia Luque,    Aspirin Low Dose 81 MG chewable tablet CHEW 1 TABLET BY MOUTH DAILY 4/28/25   Fatmata Gustafson DO   budesonide (PULMICORT) 0.5 mg/2 mL nebulizer solution TAKE 2 ML (0.5 MG TOTAL) BY NEBULIZATION TWICE A DAY RINSE MOUTH AFTER USE 8/28/24   DELIA Ackerman   ipratropium-albuterol (DUO-NEB) 0.5-2.5 mg/3 mL nebulizer solution Take 3 mL by nebulization 4 (four) times a day 5/28/25   DELIA Ackerman   losartan (COZAAR) 25 mg tablet TAKE 1/2 TABLET BY MOUTH EVERY DAY 4/28/25   Fatmata Gustafson DO   predniSONE 10 mg tablet Take 1 tablet (10 mg total) by mouth daily 2/27/25   Malia Luque DO   torsemide (DEMADEX) 20 mg tablet Take 3 tablets (60 mg total) by mouth daily for 4 days, THEN 2 tablets (40 mg total) daily. 6/2/25 9/4/25  Dottie Medrano PA-C   budesonide-formoterol (Symbicort) 160-4.5 mcg/act inhaler Inhale 2 puffs 2 (two) times a day Rinse mouth after use. 8/29/22 8/29/22  Rocky Lino DO   mometasone-formoterol (Dulera) 200-5 MCG/ACT inhaler Inhale 2 puffs 2 (two) times a day Rinse mouth after use. 8/29/22 8/29/22  Rocky Lino,      No Known Allergies    Objective :  Temp:  [98.6 °F (37 °C)] 98.6 °F (37 °C)  HR:  [62-86] 62  BP: ()/(47-72) 92/52  Resp:  [16-40] 24  SpO2:  [92 %-100 %] 98 %  O2 Device: BiPAP  Nasal Cannula O2 Flow Rate (L/min):  [4 L/min] 4 L/min    Physical Exam  Vitals reviewed.   Constitutional:       General: He is in acute distress.      Appearance: He is ill-appearing.      Comments: Severely cachectic   HENT:      Right Ear: External ear normal.      Left Ear: External ear normal.     Eyes:       "Extraocular Movements: Extraocular movements intact.       Cardiovascular:      Rate and Rhythm: Normal rate.   Pulmonary:      Effort: Respiratory distress present.      Breath sounds: Wheezing present.     Musculoskeletal:      Comments: Severe cachexia     Skin:     Capillary Refill: Capillary refill takes less than 2 seconds.     Neurological:      Comments: Somnolent but arousable to sternal rub and able to answer questions with me saying them loudly at bedside.  Reported after my extensive discussion with him as well as his wife at bedside to \" I get what your sayingn and understand. just leave me alone and let me be\".  Wife present when conversation had.                Lab Results: I have reviewed the following results:  Results from last 7 days   Lab Units 06/16/25  0612 06/15/25  1856   WBC Thousand/uL 5.39 10.87*   HEMOGLOBIN g/dL 10.3* 11.6*   HEMATOCRIT % 35.1* 39.1   PLATELETS Thousands/uL 200 246   LYMPHO PCT %  --  2*   MONO PCT %  --  4   EOS PCT %  --  0     Results from last 7 days   Lab Units 06/15/25  1856   SODIUM mmol/L 137   POTASSIUM mmol/L 3.9   CHLORIDE mmol/L 80*   CO2 mmol/L >45*   BUN mg/dL 25   CREATININE mg/dL 0.87   CALCIUM mg/dL 9.1   ALBUMIN g/dL 4.3   TOTAL BILIRUBIN mg/dL 0.54   ALK PHOS U/L 70   ALT U/L 14   AST U/L 24   GLUCOSE RANDOM mg/dL 113             Lab Results   Component Value Date    HGBA1C 6.1 (H) 03/14/2025    HGBA1C 6.5 (H) 08/30/2023    HGBA1C 5.7 (H) 07/20/2023           Administrative Statements   I have spent a total time of 75 minutes in caring for this patient on the day of the visit/encounter including Diagnostic results, Prognosis, and Risks and benefits of tx options.    ** Please Note: This note has been constructed using a voice recognition system. **         [1]   Past Medical History:  Diagnosis Date    Acute metabolic encephalopathy 03/29/2022    Arthritis     Bladder mass     Cardiomyopathy (HCC)     Chest pain     COPD (chronic obstructive pulmonary " disease) ([facility])     COPD exacerbation ([facility]) 2023    COVID-19 virus infection 2023    CPAP (continuous positive airway pressure) dependence     Dependence on respirator (ventilator) status ([facility]) 01/10/2024    Emphysema of lung ([facility])     Hypoxia     nocturnal    Left bundle branch block     Macrocytosis without anemia 2019    Multiple pulmonary nodules     last assessed: 10/12/16    Osteoarthritis 2013    Pneumonia     Sleep apnea, obstructive     Smoker     Steroid-induced hyperglycemia 2023    Weight loss 2019   [2]   Past Surgical History:  Procedure Laterality Date    COLONOSCOPY      CYSTOSCOPY      CYSTOSCOPY  2021    [place] BLADDER INSTILLATION ANTICARCINOGENIC AGENT N/A 1/3/2020    Procedure: INSTILLATION MITOMYCIN;  Surgeon: Sundeep Canchola MD;  [facility]: [facility];  Service: Urology    AZ CYSTO W/REMOVAL OF LESIONS SMALL N/A 1/3/2020    Procedure: TRANSURETHRAL RESECTION OF BLADDER TUMOR (TURBT);  Surgeon: Sundeep Canchola MD;  Location[facility];  Service: Urology    AZ CYSTOURETHROSCOPY WITH BIOPSY N/A 2021    Procedure: CYSTOSCOPY WITH BIOPSIES;  Surgeon: Sundeep Canchola MD;  Location: [facility];  Service: Urology    AZ TRURL ELECTROSURG RESCJ PROSTATE BLEED COMPLETE N/A 2021    Procedure: TRANSURETHRAL RESECTION OF PROSTATE (TURP) BLADDER BIOPSY, FULGURATION;  Surgeon: Sundeep Canchola MD;  Location: [facility];  Service: Urology   [3]   Social History  Tobacco Use    Smoking status: Former     Current packs/day: 0.00     Average packs/day: 2.5 packs/day for 42.0 years (105.0 ttl pk-yrs)     Types: Cigarettes     Start date:      Quit date: 2012     Years since quittin.4    Smokeless tobacco: Former    Tobacco comments:     1 ppd for 37 years, 2010 down to 5 cigs a day, is around second hand smoke   Vaping Use    Vaping status: Never Used   Substance and Sexual Activity    Alcohol use: Not Currently     Comment: rarely    Drug use: No     Sexual activity: Not Currently     Partners: Female

## 2025-06-16 NOTE — RESPIRATORY THERAPY NOTE
RT Protocol Note  Natalio Hartmann Jr. 70 y.o. male MRN: [id number]  Unit/Bed#: QCF Encounter: [id number]    Assessment    Active Problems:  There are no active Hospital Problems.      Home Pulmonary Medications:  On File  Home Devices/Therapy: Home O2, BiPAP/CPAP    Past Medical History[1]  Social History[2]    Subjective         Objective    Physical Exam:   Assessment Type: During-treatment  General Appearance: Drowsy  Respiratory Pattern: Dyspnea with exertion, Labored, Tachypneic  Chest Assessment: Chest expansion symmetrical  Bilateral Breath Sounds: Coarse, Diminished  Location Specific: No  Cough: None  O2 Device: BiPAP or 4 lpm NC    Vitals:  Blood pressure (!) 82/53, pulse 70, temperature 98.6 °F (37 °C), temperature source Oral, resp. rate (!) 31, SpO2 99%.          Imaging and other studies: Results Review Statement: No pertinent imaging studies reviewed.    O2 Device: BiPAP or 4 lpm NC     Plan    Respiratory Plan: Moderate/Severe Distress pathway, Vent/NIV/HFNC        Resp Comments: 71 y/o male well known to this RT dept, appearing in ED for dyspnea realted issues.  BiPAP and Mathews Neb ordered by Dr. Back and administered while pt in ED QCF.  Medium sized mask applied w/o complications.  Pt sleeping throughout the experience unless shaken by his son to talk to the RN or me.  Wife also present in room.  No continuing Rxs ordered at this time.        [1]   Past Medical History:  Diagnosis Date    Acute metabolic encephalopathy 03/29/2022    Arthritis     Bladder mass     Cardiomyopathy (HCC)     Chest pain     COPD (chronic obstructive pulmonary disease) ([facility])     COPD exacerbation ([facility]) 04/28/2023    COVID-19 virus infection 02/23/2023    CPAP (continuous positive airway pressure) dependence     Dependence on respirator (ventilator) status ([facility]) 01/10/2024    Emphysema of lung ([facility])     Hypoxia     nocturnal    Left bundle branch block     Macrocytosis without anemia 05/23/2019    Multiple pulmonary nodules      last assessed: 10/12/16    Osteoarthritis 2013    Pneumonia     Sleep apnea, obstructive     Smoker     Steroid-induced hyperglycemia 2023    Weight loss 2019   [2]   Social History  Socioeconomic History    Marital status: /Civil Union   Tobacco Use    Smoking status: Former     Current packs/day: 0.00     Average packs/day: 2.5 packs/day for 42.0 years (105.0 ttl pk-yrs)     Types: Cigarettes     Start date:      Quit date:      Years since quittin.4    Smokeless tobacco: Former    Tobacco comments:     1 ppd for 37 years, 2010 down to 5 cigs a day, is around second hand smoke   Vaping Use    Vaping status: Never Used   Substance and Sexual Activity    Alcohol use: Not Currently     Comment: rarely    Drug use: No    Sexual activity: Not Currently     Partners: Female   Social History Narrative    Daily coffee consumption: 8 or more cups a day        Used to work in [employer].  On disability.     Social Drivers of Health     Food Insecurity: No Food Insecurity (2025)    Nursing - Inadequate Food Risk Classification     Ran Out of Food in the Last Year: Never true   Transportation Needs: No Transportation Needs (2025)    Nursing - Transportation Risk Classification     Lack of Transportation: No   Intimate Partner Violence: Unknown (2025)    Nursing IPS     Physically Hurt by Someone: No     Hurt or Threatened by Someone: No   Housing Stability: Unknown (2025)    Nursing: Inadequate Housing Risk Classification     Unable to Pay for Housing in the Last Year: No     Has Housing: No

## 2025-06-16 NOTE — CONSULTS
Consultation - Palliative & Supportive Care   Natalio Hartmann Jr.  70 y.o.  male  PPHP [id number]/PPHP [phone]   MRN: [id number]  Encounter: [id number]    ASSESSMENT & PLAN:      End stage COPD (HCC)  Assessment & Plan  Appreciate pulmonology input  Patient will continue to use BiPAP at bedtime and as needed during the day.  Per pulmonary will use as needed wakefulness in setting of CO2 retention.  Continued nebulized therapy and treatment of COPD exacerbation  Discussed nature of readmissions increasing in frequency and severity    Palliative care by specialist  Assessment & Plan  Palliative Diagnosis: end stage COPD, CHF    Goals:   Level 3 DNR  See goals of care  Palliative will follow for ongoing goals of care discussions as situation evolves.    Social Support:  Patient's support system: spouse, Sarai, and son, Serjio. Patient also has a daughter who lives in [place]  Supportive listening provided  Normalized experience of patient    Care Coordination  Case discussed with primary team, CM, RN, pulmonology, family at bedside    Follow-up  We appreciate the opportunity to participate in this patient's care.   We will continue to follow while admitted.    Please do not hesitate to contact our on-call provider through [employer] Secure Chat or contact [phone] should there be an acute change or other symptom control concerns.    Please do not make any changes to symptom medications (opioid analgesics, nonopioid analgesics, antiemetics, etc) without first consulting the on-call Palliative Care provider for your specific campus; including after hours and on weekends. We are available 24/7, and can be contacted on [employer] chat or at [phone].      Acute on chronic systolic congestive heart failure (HCC)  Assessment & Plan  Wt Readings from Last 3 Encounters:   06/16/25 42.9 kg (94 lb 8 oz)   05/13/25 43 kg (94 lb 12.8 oz)   04/04/25 43 kg (94 lb 14.4 oz)               Goals of care, counseling/discussion  Assessment &  Plan  Code Status: DNR - Level 3  Continue current level of care without escalation at this time.  Patient may continue to use BiPAP as indicated.  Hospice consult placed, pending.  Patient is as wife would like to learn more about services.  Specifically how hospice would manage BiPAP and medications.  Discussed basics.  Patient has historically desired ongoing disease directed therapies and readmissions. However, for the first time today expressed desire NOT to return to the hospital for future exacerbations.   Decisional apparatus:  Patient is marginally competent on my exam today.  If competence is lost, patient's substitute decision maker would default to spouse [place].  Advance Directive / Living Will / POLST:  none completed                     IDENTIFICATION:  Inpatient consult to Palliative Care  Consult performed by: Randa Martinez PA-C  Consult ordered by: Jorge Luis Ford DO        Reason for Consult / Principal Problem: goals of care    HISTORY OF PRESENT ILLNESS:    Natalio Hartmann Jr. is a 70 y.o. male with end-stage COPD well-known to palliative medicine, presented to [facility] on 6/15 with decreased responsiveness and hypoxia.  Patient was placed on BiPAP in the ER and treatment initiated for COPD exacerbation    Patient is well-known to Dr. Luque of [facility] pulmonology.  He has been using BiPAP at bedtime and as needed during the day for quite some time.  He has had many hospitalizations for COPD and CHF exacerbations at which time goals of care have been repeatedly discussed.  Patient has consistently expressed desire to continue disease directed treatment in the past and welcomed future hospitalizations.  Palliative care consulted to address goals of care.    Patient had BiPAP in place all night.  This morning CO2 increasingly elevated.  Pulmonology consult appreciated.  Plan to use BiPAP as needed wakefulness and CO2 retention.    Spouse, Mellisa, and son, Serjio, both present  during time of encounter.  They report that day up to admission he was less responsive and was checking pulse ox at home for which they brought him in.  Patient has been minimally responsive this morning, but upon second attempt during time of encounter he awoke.  Patient is oriented to the basics of his situation though disoriented to time.  However, he does have insight to being in the hospital approximately 1 day.    Natalio expresses frustration with his current condition and burdens of being in the hospital.  For the first time he expresses desire to return home and not ever presented back to the hospital.  Discussed consideration of hospice given his stated goals.  Patient is somewhat resistant to additional support in the home but does welcome the idea of end-of-life care.  Spouse, Mellisa, has previously and also presently been receptive to additional support.  Her priority is to honor Natalio's wishes but recognizes that his care needs continue to rise.  She continues to work part-time.  Patient's son, Serjio, also works.  And daughter lives out of state.     Interview and exam limited by: n/a    Past Medical History[1]  Past Surgical History[2]  Social History     Socioeconomic History    Marital status: /Civil Union     Spouse name: Not on file    Number of children: Not on file    Years of education: Not on file    Highest education level: Not on file   Occupational History    Not on file   Tobacco Use    Smoking status: Former     Current packs/day: 0.00     Average packs/day: 2.5 packs/day for 42.0 years (105.0 ttl pk-yrs)     Types: Cigarettes     Start date:      Quit date: 2012     Years since quittin.4    Smokeless tobacco: Former    Tobacco comments:     1 ppd for 37 years, 2010 down to 5 cigs a day, is around second hand smoke   Vaping Use    Vaping status: Never Used   Substance and Sexual Activity    Alcohol use: Not Currently     Comment: rarely    Drug use: No    Sexual  "activity: Not Currently     Partners: Female   Other Topics Concern    Not on file   Social History Narrative    Daily coffee consumption: 8 or more cups a day        Used to work in demolition.  On disability.     Social Drivers of Health     Financial Resource Strain: Not on file   Food Insecurity: No Food Insecurity (5/13/2025)    Nursing - Inadequate Food Risk Classification     Worried About Running Out of Food in the Last Year: Not on file     Ran Out of Food in the Last Year: Not on file     Ran Out of Food in the Last Year: Never true   Transportation Needs: No Transportation Needs (5/13/2025)    Nursing - Transportation Risk Classification     Lack of Transportation: Not on file     Lack of Transportation: No   Physical Activity: Not on file   Stress: Not on file   Social Connections: Not on file   Intimate Partner Violence: Unknown (5/13/2025)    Nursing IPS     Feels Physically and Emotionally Safe: Not on file     Physically Hurt by Someone: Not on file     Humiliated or Emotionally Abused by Someone: Not on file     Physically Hurt by Someone: No     Hurt or Threatened by Someone: No   Housing Stability: Unknown (5/13/2025)    Nursing: Inadequate Housing Risk Classification     Has Housing: Not on file     Worried About Losing Housing: Not on file     Unable to Get Utilities: Not on file     Unable to Pay for Housing in the Last Year: No     Has Housing: No     Family History[3]    MEDICATIONS / ALLERGIES:  all current active meds have been reviewed    Allergies[4]    OBJECTIVE:  /64   Pulse 66   Temp (!) 97.4 °F (36.3 °C)   Resp 14   Ht 5' 7\" (1.702 m)   Wt 42.9 kg (94 lb 8 oz)   SpO2 98%   BMI 14.80 kg/m²   Physical Exam  HENT:      Head: Normocephalic and atraumatic.      Comments: Temporal wasting    Eyes:      Conjunctiva/sclera: Conjunctivae normal.       Cardiovascular:      Rate and Rhythm: Normal rate.   Pulmonary:      Comments: O2 via NC  Abdominal:      Tenderness: There " is no guarding.     Musculoskeletal:      Comments: Finger clubbing     Skin:     General: Skin is warm and dry.     Neurological:      Mental Status: He is alert. Mental status is at baseline.      Comments: Oriented to self, place, relative time but not month or year. He is oriented to basics of his situation   Psychiatric:      Comments: irritable         Lab Results: I have personally reviewed pertinent labs.    HEMATOLOGY PROFILE:  Results from last 7 days   Lab Units 06/16/25  0612 06/15/25  1856   WBC Thousand/uL 5.39 10.87*   HEMOGLOBIN g/dL 10.3* 11.6*   HEMATOCRIT % 35.1* 39.1   PLATELETS Thousands/uL 200 246   MONO PCT % 0* 4   EOS PCT % 0 0       CHEMISTRY PROFILE:  Results from last 7 days   Lab Units 06/16/25  0612 06/15/25  1856   SODIUM mmol/L 139 137   POTASSIUM mmol/L 4.2 3.9   CHLORIDE mmol/L 86* 80*   CO2 mmol/L >45* >45*   BUN mg/dL 25 25   CREATININE mg/dL 0.76 0.87   ALBUMIN g/dL 3.6 4.3   CALCIUM mg/dL 8.4 9.1   ALK PHOS U/L 57 70   ALT U/L 11 14   AST U/L 20 24       Albumin:  0   Lab Value Date/Time    ALB 3.6 06/16/2025 0612    ALB 3.9 08/17/2015 0756     Imaging Studies: I have personally reviewed pertinent reports.  No results found.  EKG, Pathology, and Other Studies: I have personally reviewed pertinent reports.    Counseling / Coordination of Care:  60+ minutes total time spent on 06/16/25 in caring for this patient including obtaining/reviewing history, symptom assessment and management, medication review / adjustment, psychosocial support, reviewing / ordering tests, medicines, imaging, procedures, goals of care, hospice services, supportive listening, anticipatory guidance, patient/family education, risks/benefits of treatment(s), risk factor reduction, and documenting in the medical record. All patient/family questions were answered during this discussion.    Randa Martinez PA-C  [facility]  [phone]    Portions of this document may have been  "created using dictation software and as such some \"sound alike\" terms may have been generated by the system. Do not hesitate to contact me with any questions or clarifications.         [1]   Past Medical History:  Diagnosis Date    Acute metabolic encephalopathy 03/29/2022    Arthritis     Bladder mass     Cardiomyopathy (HCC)     Chest pain     COPD (chronic obstructive pulmonary disease) ([facility])     COPD exacerbation ([facility]) 04/28/2023    COVID-19 virus infection 02/23/2023    CPAP (continuous positive airway pressure) dependence     Dependence on respirator (ventilator) status ([facility]) 01/10/2024    Emphysema of lung ([facility])     Hypoxia     nocturnal    Left bundle branch block     Macrocytosis without anemia 05/23/2019    Multiple pulmonary nodules     last assessed: 10/12/16    Osteoarthritis 08/03/2013    Pneumonia     Sleep apnea, obstructive     Smoker     Steroid-induced hyperglycemia 03/30/2023    Weight loss 08/29/2019   [2]   Past Surgical History:  Procedure Laterality Date    COLONOSCOPY      CYSTOSCOPY      CYSTOSCOPY  02/17/2021    SC BLADDER INSTILLATION ANTICARCINOGENIC AGENT N/A 1/3/2020    Procedure: INSTILLATION MITOMYCIN;  Surgeon: Sundeep Canchola MD;  Location: [facility];  Service: Urology    SC CYSTO W/REMOVAL OF LESIONS SMALL N/A 1/3/2020    Procedure: TRANSURETHRAL RESECTION OF BLADDER TUMOR (TURBT);  Surgeon: Sundeep Canchola MD;  [facility];  Service: Urology    SC CYSTOURETHROSCOPY WITH BIOPSY N/A 4/13/2021    Procedure: CYSTOSCOPY WITH BIOPSIES;  Surgeon: Sundeep Canchola MD;  [facility];  Service: Urology    SC TRURL ELECTROSURG RESCJ PROSTATE BLEED COMPLETE N/A 4/13/2021    Procedure: TRANSURETHRAL RESECTION OF PROSTATE (TURP) BLADDER BIOPSY, FULGURATION;  Surgeon: Sundeep Canchola MD;  Location[facility];  Service: Urology   [3]   Family History  Problem Relation Name Age of Onset    Diabetes Mother     [4] No Known Allergies    "

## 2025-06-16 NOTE — ASSESSMENT & PLAN NOTE
Palliative Diagnosis: end stage COPD, CHF    Goals:   Level 3 DNR  See goals of care  Palliative will follow for ongoing goals of care discussions as situation evolves.    Social Support:  Patient's support system: spouse, Sarai, and son, Serjio. Patient also has a daughter who lives in [place]  Supportive listening provided  Normalized experience of patient  Palliative  to follow-up.  Spouse inquires about completion of advance directive.    Care Coordination  Case discussed with primary team, CM, RN, hospice, family at bedside    Follow-up  We appreciate the opportunity to participate in this patient's care.   We will continue to follow while admitted.    Please do not hesitate to contact our on-call provider through [employer] Secure Chat or contact [phone] should there be an acute change or other symptom control concerns.    Please do not make any changes to symptom medications (opioid analgesics, nonopioid analgesics, antiemetics, etc) without first consulting the on-call Palliative Care provider for your specific campus; including after hours and on weekends. We are available 24/7, and can be contacted on [employer] chat or at [phone].

## 2025-06-16 NOTE — ASSESSMENT & PLAN NOTE
Echo in March 2025 with LVEF 25%.  On PTA torsemide 40 mg daily and losartan 12.5 mg daily  On hold given borderline blood pressures  Per primary

## 2025-06-16 NOTE — ASSESSMENT & PLAN NOTE
Presented to the ER with respiratory distress and placed on BiPAP  Chest x-ray reviewed with evidence of underlying severe emphysema possible volume overload more noted of the right side on my read awaiting official read   Patient with chronic hypoxic and hypercapnic respiratory failure related primarily to end-stage COPD but also has nonischemic cardiomyopathy with EF 25%; Requires supplemental O2 24/7 as well as BiPAP at night as well as additionally has been needing to use it throughout the day at times  VBG 7.24/137/30/58 initially before BIPAP in the ER.   Extensive discussion with patient's wife as well as patient in ER Room regarding goals of care in setting of end stage COPD who presents with hypercapnic respiratory failure. Medical recommendation of hospice was made given patient's critically ill status. Patient and wife at bedside aware of recommendation. Patient and wife would like to continue bipap overnight despite minimal improvements in gas and my concerns with patient's overall comfort. We did discuss the concerns with further escalation of care to a ventilator/cpr given patient's frail state/end stage copd and patient as well as wife understanding of this. As such both report that patients code status should be DNR/DNI with that limit set.   Patient additionally with soft Bps with SBP in the 90's overnight  Patient given nebulizers, IV Solu-Medrol as well as IV azithromycin and continuous BiPAP overnight with repeat VBG this AM 7.35/101/34/55  Admit to medicine on telemetry stepdown level 2 and continue BiPAP.  Continue scheduled nebulizers, IV Solu-Medrol and IV azithromycin.  Given patient's systolic blood pressure in the 90s, unable to trial a dose of IV diuretic currently as may additionally have component of CHF but if pressures trend up may trial.  As afebrile, without leukocytosis apart from azithromycin for patient's underlying COPD additional antimicrobials were held for now.  Pulmonary  consultation appreciate their assistance  Palliative care consultation for continued goals of care discussion as patient appears in the end stages of life

## 2025-06-16 NOTE — ASSESSMENT & PLAN NOTE
Extensive discussion with patient's wife as well as patient in ER Room regarding goals of care in setting of end stage COPD who presents with hypercapnic respiratory failure. Medical recommendation of hospice was made given patient's critically ill status. Patient and wife at bedside aware of recommendation. Patient and wife would like to continue bipap overnight despite minimal improvements in gas and my concerns with patient's overall comfort. We did discuss the concerns with further escalation of care to a ventilator/cpr given patient's frail state/end stage copd and patient as well as wife understanding of this. As such both report that patients code status should be DNR/DNI with that limit set.   Consult palliative care team for further assistance with continued goals of care discussions as patient with end-stage COPD as well as severe cachexia and highly concerned patient is in the end stages of life

## 2025-06-16 NOTE — ASSESSMENT & PLAN NOTE
Code Status: DNR - Level 3  Continue current level of care without escalation at this time.  Patient may continue to use BiPAP as indicated.  Hospice consult placed, pending.  Patient is as wife would like to learn more about services.  Specifically how hospice would manage BiPAP and medications.  Discussed basics.  Patient has historically desired ongoing disease directed therapies and readmissions. However, for the first time yesterday he expressed desire NOT to return to the hospital for future exacerbations.   Decisional apparatus:  Patient is marginally competent on my exam today.  If competence is lost, patient's substitute decision maker would default to spouse by [place] 169.  Advance Directive / Living Will / POLST:  none completed

## 2025-06-16 NOTE — ASSESSMENT & PLAN NOTE
Wt Readings from Last 3 Encounters:   06/16/25 42.9 kg (94 lb 8 oz)   05/13/25 43 kg (94 lb 12.8 oz)   04/04/25 43 kg (94 lb 14.4 oz)

## 2025-06-16 NOTE — PHYSICAL THERAPY NOTE
PHYSICAL THERAPY NOTE          Patient Name: Natalio Hartmann Jr.  Today's Date: 6/16/2025    PT orders received, pt chart reviewed. Per RN, pt on continues bi-pap, increased lethargy- not medically appropriate to participate in therapy session. Per CM notes, GOC/ hospice discussion ongoing. PT to sign off at this time. Please re-consult if needed/ appropriate. Thank you     Malia Victor, PT, DPT

## 2025-06-16 NOTE — UTILIZATION REVIEW
NOTIFICATION OF INPATIENT ADMISSION   AUTHORIZATION REQUEST   SERVICING FACILITY:   [facility]  Address: [address] [place] [address]  Tax ID: [id number]  NPI: [id number] ATTENDING PROVIDER:  Attending Name and NPI#: Eric Stanton Md [[id number]]  Address: [address] [place] [address]  Phone: [phone]   ADMISSION INFORMATION:  Place of Service: Inpatient [facility]  Place of Service Code: 21  Inpatient Admission Date/Time: 6/15/25 10:21 PM  Discharge Date/Time: No discharge date for patient encounter.  Admitting Diagnosis Code/Description:  COPD (chronic obstructive pulmonary disease) ([facility]) [J44.9]  Hypercarbia [R06.89]  Altered mental status [R41.82]  Lethargy [R53.83]  Goals of care, counseling/discussion [Z71.89]     UTILIZATION REVIEW CONTACT:  Michael Cardoza, Utilization   Network Utilization Review Department  Phone: [phone]  Fax: [phone]  Email: [email or web address]  Contact for approvals/pending authorizations, clinical reviews, and discharge.     PHYSICIAN ADVISORY SERVICES:  Medical Necessity Denial & Sbnk-pu-Bfrp Review  Phone: [phone]  Fax: [phone]  Email: [email or web address]     DISCHARGE SUPPORT TEAM:  For Patients Discharge Needs & Updates  Phone: [phone] opt. 2 Fax: [phone]  Email: [email or web address]

## 2025-06-16 NOTE — ED PROVIDER NOTES
Time reflects when diagnosis was documented in both MDM as applicable and the Disposition within this note       Time User Action Codes Description Comment    6/15/2025 10:20 PM Connor Ambrocio Add [R06.89] Hypercarbia     6/15/2025 10:20 PM Connor Ambrocio Add [R41.82] Altered mental status     6/15/2025 10:20 PM Connor Ambrocio Add [J44.9] COPD (chronic obstructive pulmonary disease) (HCC)     6/15/2025 11:05 PM Jorge Luis Ford Add [Z71.89] Goals of care, counseling/discussion     6/17/2025  7:16 PM Jorge Luis Back Add [R06.03] Respiratory distress     6/19/2025  2:09 PM Bubba Velasquez Add [J44.9] End stage COPD (HCC)           ED Disposition       ED Disposition   Admit    Condition   Stable    Date/Time   Sun Rory 15, 2025 10:20 PM    Comment   Case was discussed with Dr. Ford and the patient's admission status was agreed to be Admission Status: inpatient status to the service of Dr. Ford .               Assessment & Plan       Medical Decision Making  Patient extremely ill-appearing.  Will place on BiPAP give DuoNeb get basic labs will require admission.    Patient continues to require oxygen above his baseline in addition to his failure to thrive.  Will admit to medicine at this time given that the patient is now a level 3 DNR/DNI.    Amount and/or Complexity of Data Reviewed  Labs: ordered. Decision-making details documented in ED Course.  Radiology: independent interpretation performed.    Risk  Prescription drug management.  Decision regarding hospitalization.        ED Course as of 06/22/25 1428   Sun Rory 15, 2025   1931 hs TnI 0hr: 45       Medications   albuterol inhalation solution 10 mg (10 mg Nebulization Given 6/15/25 1941)   ipratropium (ATROVENT) 0.02 % inhalation solution 1 mg (1 mg Nebulization Given 6/15/25 1941)   sodium chloride 0.9 % inhalation solution 12 mL (12 mL Nebulization Given 6/15/25 1941)   sodium chloride 0.9 % bolus 250 mL (0 mL Intravenous Stopped 6/15/25 2122)   methylPREDNISolone  sodium succinate (Solu-MEDROL) injection 40 mg (40 mg Intravenous Given 6/15/25 6336)   sodium chloride 0.9 % bolus 500 mL (0 mL Intravenous Stopped 6/16/25 0325)       ED Risk Strat Scores                    No data recorded        SBIRT 22yo+      Flowsheet Row Most Recent Value   Initial Alcohol Screen: US AUDIT-C     1. How often do you have a drink containing alcohol? 0 Filed at: 06/15/2025 1945   2. How many drinks containing alcohol do you have on a typical day you are drinking?  0 Filed at: 06/15/2025 1945   3a. Male UNDER 65: How often do you have five or more drinks on one occasion? 0 Filed at: 06/15/2025 1945   3b. FEMALE Any Age, or MALE 65+: How often do you have 4 or more drinks on one occassion? 0 Filed at: 06/15/2025 1945   Audit-C Score 0 Filed at: 06/15/2025 1945   SAIMA: How many times in the past year have you...    Used an illegal drug or used a prescription medication for non-medical reasons? Never Filed at: 06/15/2025 1945                            History of Present Illness       Chief Complaint   Patient presents with    Lethargy     Pt presents from home, waxing and waning LOC, hx of COPD, CHF, when aroused pt offers no complaints, denies chest pain, denies SOB. 4L at baseline. Abdominal distention, congested cough       Past Medical History[1]   Past Surgical History[2]   Family History[3]   Social History[4]   E-Cigarette/Vaping    E-Cigarette Use Never User       E-Cigarette/Vaping Substances    Nicotine No     THC No     CBD No     Flavoring No     Other No     Unknown No       I have reviewed and agree with the history as documented.     70-year-old male past medical history of COPD, prostate cancer, CHF presenting the emergency department for difficulty breathing.  Patient has had a lethargy for the last several days and has been having a cough.  Patient has had increased difficulty breathing and wife has had a hard time maintaining his O2 sats even on BiPAP at home even with  "increasing the oxygen amount.  States that he did not want to come in but she brought him in today regardless.  Patient does require 4 L nasal cannula during the day and BiPAP at night.  Patient unable to provide any useful history.  Wife says that he has not been eating or drinking either.          Review of Systems        Objective       ED Triage Vitals   Temperature Pulse Blood Pressure Respirations SpO2 Patient Position - Orthostatic VS   06/15/25 1847 06/15/25 1846 06/15/25 1846 06/15/25 1846 06/15/25 1847 06/15/25 1846   98.6 °F (37 °C) 86 112/67 (!) 40 96 % Sitting      Temp Source Heart Rate Source BP Location FiO2 (%) Pain Score    06/15/25 1847 06/15/25 1846 06/15/25 1846 -- 06/15/25 1846    Oral Monitor Right arm  No Pain      Vitals      Date and Time Temp Pulse SpO2 Resp BP Pain Score FACES Pain Rating User   06/19/25 1951 97.4 °F (36.3 °C) 85 98 % 14 125/69 -- -- DII   06/19/25 1903 -- -- 98 % -- -- -- -- KW   06/19/25 1502 97.7 °F (36.5 °C) 69 95 % 30 106/64 -- -- DII   06/19/25 1323 -- -- -- -- -- No Pain -- BA   06/19/25 1323 97.6 °F (36.4 °C) 73 95 % 18 99/60 -- -- DII   06/19/25 1051 -- -- -- -- -- No Pain -- NM   06/19/25 1050 -- -- -- -- -- No Pain -- ML   06/19/25 0736 -- -- -- 18 -- No Pain -- BA   06/19/25 0736 97.5 °F (36.4 °C) 60 94 % -- 100/60 -- -- DII   06/19/25 0239 97.6 °F (36.4 °C) 66 96 % -- 109/62 -- -- DII   06/19/25 0215 -- -- 98 % -- -- -- -- AS   06/18/25 2300 -- -- -- -- -- No Pain -- AS   06/18/25 2141 97.3 °F (36.3 °C) 67 100 % 16 111/63 -- -- DII   06/18/25 1915 -- -- 99 % -- -- -- -- AS   06/18/25 1905 97.2 °F (36.2 °C) 68 100 % -- 110/63 -- -- DII   06/18/25 1523 97.6 °F (36.4 °C) 69 99 % 20 113/65 -- -- DII   06/18/25 1400 -- -- 99 % -- -- No Pain -- KN   06/18/25 1145 -- -- 96 % -- -- -- -- KN   06/18/25 1137 98.1 °F (36.7 °C) 91 95 % -- 115/61 -- -- DII   06/18/25 0845 -- -- 98 % -- -- No Pain at rest. pt states he's \"uncomfortable with movement but unable to give a " number. No painful behaviors observed. -- KN   06/18/25 0739 97.5 °F (36.4 °C) 70 100 % -- 107/65 -- -- DII   06/18/25 0232 97.5 °F (36.4 °C) 72 98 % 16 108/66 -- -- DII   06/18/25 0215 -- -- 97 % -- -- -- -- AS   06/17/25 1930 -- -- -- -- -- No Pain -- AS   06/17/25 1910 98 °F (36.7 °C) 82 97 % -- 107/62 -- -- DII   06/17/25 1540 98.3 °F (36.8 °C) 87 96 % 15 111/64 -- -- DII   06/17/25 1434 -- -- 94 % -- -- -- -- PMG   06/17/25 1430 -- -- -- -- -- No Pain -- QUEENIE   06/17/25 1136 98.7 °F (37.1 °C) 84 99 % 16 112/64 -- -- DII   06/17/25 0928 -- 97 -- 36 -- -- -- QUEENIE   06/17/25 0910 -- -- -- -- -- No Pain -- QUEENIE   06/17/25 0757 98.9 °F (37.2 °C) 90 100 % -- 111/69 -- -- DII   06/17/25 0237 98 °F (36.7 °C) 78 98 % -- 114/69 -- -- DII   06/17/25 0200 -- -- 99 % -- -- -- -- AS   06/16/25 2143 97.6 °F (36.4 °C) 75 100 % 20 106/61 -- -- DII   06/16/25 1938 -- -- 100 % -- -- -- -- KW   06/16/25 1933 97.4 °F (36.3 °C) 63 98 % 16 110/66 -- -- DII   06/16/25 1915 -- -- 100 % -- -- No Pain -- AS   06/16/25 1339 -- -- 98 % -- -- -- -- ML   06/16/25 1300 -- -- -- -- -- No Pain -- ML   06/16/25 1123 97.4 °F (36.3 °C) 66 99 % 14 107/64 -- -- DII   06/16/25 0900 -- -- -- -- -- No Pain -- ML   06/16/25 0844 97.3 °F (36.3 °C) 72 99 % 17 99/59 -- -- DII   06/16/25 0800 -- 74 100 % 22 101/58 -- -- KM   06/16/25 0730 -- 62 100 % 22 91/54 -- -- KM   06/16/25 0700 -- -- -- -- -- No Pain -- KM   06/16/25 0600 -- 62 98 % 24 92/52 -- -- [facility]   06/16/25 0415 -- 68 99 % 17 94/51 -- -- [facility]   06/16/25 0339 -- 72 -- 18 105/55 -- -- [facility]   06/16/25 0330 -- 72 -- 17  84/52 -- -- [facility]   06/16/25 0300 -- 72 -- 18 90/52 -- -- [facility]   06/16/25 0215 -- 84 -- 16  87/55 provider notified and fluid bolus ordered -- -- [facility]   06/16/25 0130 -- 82 -- 17 89/54 -- -- [facility]   06/16/25 0100 -- 76 -- 18 94/51 -- -- [facility]   06/16/25 0045 -- 72 -- 18  77/47 provider notified -- -- [facility]   06/16/25 0019 -- 86 -- 18 93/60 -- -- [facility]   06/16/25 0015 -- 80 -- 20 89/57 -- -- [facility]    06/15/25 2230 -- 68 92 % 23 97/57 -- -- CB   06/15/25 2215 -- 72 93 % 27 98/60 -- -- CB   06/15/25 2200 -- 72 98 % 34 112/58 -- -- CB   06/15/25 2145 -- 84 -- 33 106/65 -- -- CB   06/15/25 2115 -- 76 97 % 32 109/63 -- -- CB   06/15/25 2045 -- 72 97 % 33 105/72 -- -- CB   06/15/25 2030 -- 70 98 % 30 100/66 -- --    06/15/25 2023 -- 70 99 % 30 99/61 -- --    06/15/25 2015 -- 70 99 % 31 82/53 -- -- CB   06/15/25 2000 -- 76 100 % 37 106/62 No Pain -- MD   06/15/25 1847 98.6 °F (37 °C) -- 96 % -- -- -- --    06/15/25 1846 -- 86 -- 40 112/67 No Pain -- CB            Physical Exam  Vitals and nursing note reviewed.   Constitutional:       Appearance: He is well-developed. He is ill-appearing.      Comments: Cachectic   HENT:      Head: Normocephalic and atraumatic.      Nose: Nose normal.      Mouth/Throat:      Mouth: Mucous membranes are dry.      Pharynx: No oropharyngeal exudate or posterior oropharyngeal erythema.     Eyes:      Extraocular Movements: Extraocular movements intact.      Pupils: Pupils are equal, round, and reactive to light.       Cardiovascular:      Rate and Rhythm: Normal rate and regular rhythm.      Pulses: Normal pulses.      Heart sounds: Normal heart sounds.   Pulmonary:      Effort: Respiratory distress present.   Abdominal:      General: Bowel sounds are normal.      Palpations: Abdomen is soft.     Musculoskeletal:         General: Normal range of motion.      Cervical back: Normal range of motion.     Skin:     General: Skin is warm and dry.     Neurological:      Mental Status: He is alert.         Results Reviewed       Procedure Component Value Units Date/Time    Blood gas, venous [[id number]]  (Abnormal) Collected: 06/16/25 0759    Lab Status: Final result Specimen: Blood from Arm, Right Updated: 06/16/25 0830     pH, Oscar 7.252     pCO2, Oscar 122.2 mm Hg      pO2, Oscar 26.6 mm Hg      HCO3, Oscar 52.6 mmol/L      Base Excess, Oscar 20.6 mmol/L      O2 Content, Oscar 6.3 ml/dL      O2  HGB, VENOUS 40.3 %     RBC Morphology Reflex Test [[id number]] Collected: 06/16/25 0612    Lab Status: Final result Specimen: Blood from Arm, Right Updated: 06/16/25 0801    Fingerstick Glucose (POCT) [[id number]]  (Abnormal) Collected: 06/16/25 0754    Lab Status: Final result Specimen: Blood Updated: 06/16/25 0757     POC Glucose 145 mg/dl     Manual Differential(PHLEBS Do Not Order) [[id number]]  (Abnormal) Collected: 06/16/25 0612    Lab Status: Final result Specimen: Blood from Arm, Right Updated: 06/16/25 0721     Segmented % 95 %      Lymphocytes % 3 %      Monocytes % 0 %      Eosinophils % 0 %      Basophils % 0 %      Atypical Lymphocytes % 2 %      Absolute Neutrophils 5.12 Thousand/uL      Absolute Lymphocytes 0.27 Thousand/uL      Absolute Monocytes 0.00 Thousand/uL      Absolute Eosinophils 0.00 Thousand/uL      Absolute Basophils 0.00 Thousand/uL      Total Counted --     RBC Morphology Present     Platelet Estimate Adequate     Anisocytosis Present     Hypochromia Present     Poikilocytes Present    CBC and differential [[id number]]  (Abnormal) Collected: 06/16/25 0612    Lab Status: Final result Specimen: Blood from Arm, Right Updated: 06/16/25 0721     WBC 5.39 Thousand/uL      RBC 3.39 Million/uL      Hemoglobin 10.3 g/dL      Hematocrit 35.1 %       fL      MCH 30.4 pg      MCHC 29.3 g/dL      RDW 12.1 %      MPV 9.4 fL      Platelets 200 Thousands/uL     Narrative:      This is an appended report.  These results have been appended to a previously verified report.    Protime-INR [[id number]]  (Abnormal) Collected: 06/16/25 0612    Lab Status: Final result Specimen: Blood from Arm, Right Updated: 06/16/25 0715     Protime 11.3 seconds      INR 0.79    Narrative:      INR Therapeutic Range    Indication                                             INR Range      Atrial Fibrillation                                               2.0-3.0  Hypercoagulable State                                     2.0.2.3  Left Ventricular Asist Device                            2.0-3.0  Mechanical Heart Valve                                  -    Aortic(with afib, MI, embolism, HF, LA enlargement,    and/or coagulopathy)                                     2.0-3.0 (2.5-3.5)     Mitral                                                             2.5-3.5  Prosthetic/Bioprosthetic Heart Valve               2.0-3.0  Venous thromboembolism (VTE: VT, PE        2.0-3.0    APTT [[id number]]  (Normal) Collected: 06/16/25 0612    Lab Status: Final result Specimen: Blood from Arm, Right Updated: 06/16/25 0715     PTT 25 seconds     Comprehensive metabolic panel [[id number]]  (Abnormal) Collected: 06/16/25 0612    Lab Status: Final result Specimen: Blood from Arm, Right Updated: 06/16/25 0710     Sodium 139 mmol/L      Potassium 4.2 mmol/L      Chloride 86 mmol/L      CO2 >45 mmol/L      ANION GAP --     BUN 25 mg/dL      Creatinine 0.76 mg/dL      Glucose 147 mg/dL      Calcium 8.4 mg/dL      AST 20 U/L      ALT 11 U/L      Alkaline Phosphatase 57 U/L      Total Protein 6.0 g/dL      Albumin 3.6 g/dL      Total Bilirubin 0.59 mg/dL      eGFR 93 ml/min/1.73sq m     Narrative:      National Kidney Disease Foundation guidelines for Chronic Kidney Disease (CKD):     Stage 1 with normal or high GFR (GFR > 90 mL/min/1.73 square meters)    Stage 2 Mild CKD (GFR = 60-89 mL/min/1.73 square meters)    Stage 3A Moderate CKD (GFR = 45-59 mL/min/1.73 square meters)    Stage 3B Moderate CKD (GFR = 30-44 mL/min/1.73 square meters)    Stage 4 Severe CKD (GFR = 15-29 mL/min/1.73 square meters)    Stage 5 End Stage CKD (GFR <15 mL/min/1.73 square meters)  Note: GFR calculation is accurate only with a steady state creatinine    Magnesium [[id number]]  (Normal) Collected: 06/16/25 0612    Lab Status: Final result Specimen: Blood from Arm, Right Updated: 06/16/25 0710     Magnesium 2.0 mg/dL     Blood gas, venous [[id number]]  (Abnormal) Collected: 06/16/25  0215    Lab Status: Final result Specimen: Blood from Arm, Right Updated: 06/16/25 0231     pH, Oscar 7.354     pCO2, Oscar 101.8 mm Hg      pO2, Oscar 34.0 mm Hg      HCO3, Oscar 55.4 mmol/L      Base Excess, Oscar 24.8 mmol/L      O2 Content, Oscar 10.0 ml/dL      O2 HGB, VENOUS 61.7 %     HS Troponin I 4hr [[id number]]  (Normal) Collected: 06/15/25 2255    Lab Status: Final result Specimen: Blood from Arm, Right Updated: 06/15/25 2324     hs TnI 4hr 38 ng/L      Delta 4hr hsTnI -7 ng/L     Blood gas, venous [[id number]]  (Abnormal) Collected: 06/15/25 2155    Lab Status: Final result Specimen: Blood from Arm, Right Updated: 06/15/25 2213     pH, Oscar 7.263     pCO2, Oscar 121.2 mm Hg      pO2, Oscar 25.5 mm Hg      HCO3, Oscar 53.5 mmol/L      Base Excess, Oscar 21.4 mmol/L      O2 Content, Oscar 6.1 ml/dL      O2 HGB, VENOUS 38.3 %     HS Troponin I 2hr [[id number]]  (Normal) Collected: 06/15/25 2046    Lab Status: Final result Specimen: Blood from Arm, Right Updated: 06/15/25 2113     hs TnI 2hr 40 ng/L      Delta 2hr hsTnI -5 ng/L     RBC Morphology Reflex Test [[id number]] Collected: 06/15/25 1856    Lab Status: Final result Specimen: Blood from Arm, Right Updated: 06/15/25 2001    HS Troponin 0hr (reflex protocol) [[id number]]  (Normal) Collected: 06/15/25 1856    Lab Status: Final result Specimen: Blood from Arm, Right Updated: 06/15/25 1931     hs TnI 0hr 45 ng/L     Comprehensive metabolic panel [[id number]]  (Abnormal) Collected: 06/15/25 1856    Lab Status: Final result Specimen: Blood from Arm, Right Updated: 06/15/25 1925     Sodium 137 mmol/L      Potassium 3.9 mmol/L      Chloride 80 mmol/L      CO2 >45 mmol/L      ANION GAP --     BUN 25 mg/dL      Creatinine 0.87 mg/dL      Glucose 113 mg/dL      Calcium 9.1 mg/dL      AST 24 U/L      ALT 14 U/L      Alkaline Phosphatase 70 U/L      Total Protein 7.1 g/dL      Albumin 4.3 g/dL      Total Bilirubin 0.54 mg/dL      eGFR 88 ml/min/1.73sq m     Narrative:      National Kidney  Disease Foundation guidelines for Chronic Kidney Disease (CKD):     Stage 1 with normal or high GFR (GFR > 90 mL/min/1.73 square meters)    Stage 2 Mild CKD (GFR = 60-89 mL/min/1.73 square meters)    Stage 3A Moderate CKD (GFR = 45-59 mL/min/1.73 square meters)    Stage 3B Moderate CKD (GFR = 30-44 mL/min/1.73 square meters)    Stage 4 Severe CKD (GFR = 15-29 mL/min/1.73 square meters)    Stage 5 End Stage CKD (GFR <15 mL/min/1.73 square meters)  Note: GFR calculation is accurate only with a steady state creatinine    Manual Differential(PHLEBS Do Not Order) [[id number]]  (Abnormal) Collected: 06/15/25 1856    Lab Status: Final result Specimen: Blood from Arm, Right Updated: 06/15/25 1924     Segmented % 94 %      Lymphocytes % 2 %      Monocytes % 4 %      Eosinophils % 0 %      Basophils % 0 %      Absolute Neutrophils 10.22 Thousand/uL      Absolute Lymphocytes 0.22 Thousand/uL      Absolute Monocytes 0.43 Thousand/uL      Absolute Eosinophils 0.00 Thousand/uL      Absolute Basophils 0.00 Thousand/uL      Total Counted --     RBC Morphology Present     Platelet Estimate Adequate     Clumped Platelets Present     Macrocytes Present     Ovalocytes Present     Poikilocytes Present    CBC and differential [[id number]]  (Abnormal) Collected: 06/15/25 1856    Lab Status: Final result Specimen: Blood from Arm, Right Updated: 06/15/25 1924     WBC 10.87 Thousand/uL      RBC 3.76 Million/uL      Hemoglobin 11.6 g/dL      Hematocrit 39.1 %       fL      MCH 30.9 pg      MCHC 29.7 g/dL      RDW 12.0 %      MPV 9.4 fL      Platelets 246 Thousands/uL     Narrative:      This is an appended report.  These results have been appended to a previously verified report.    Blood gas, venous [[id number]]  (Abnormal) Collected: 06/15/25 1910    Lab Status: Final result Specimen: Blood from Arm, Right Updated: 06/15/25 1919     pH, Oscar 7.248     pCO2, Oscar 137.2 mm Hg      pO2, Oscar 30.9 mm Hg      HCO3, Oscar 58.5 mmol/L      Base  Excess, Oscar 24.9 mmol/L      O2 Content, Oscar 8.5 ml/dL      O2 HGB, VENOUS 49.9 %             XR chest portable   Final Interpretation by Prachi Carlos MD (06/19 1534)      No acute cardiopulmonary disease.            Workstation performed: [id number]         XR chest portable   ED Interpretation by Yogesh Ward MD (06/15 2020)   Redemonstrated pulmonary vascular congestion.      Final Interpretation by Alexandra Swift MD (06/16 0931)      Mild pulmonary venous congestion.            Workstation performed: [id number]             Procedures    ED Medication and Procedure Management   Prior to Admission Medications   Prescriptions Last Dose Informant Patient Reported? Taking?   Aspirin Low Dose 81 MG chewable tablet  Self No No   Sig: CHEW 1 TABLET BY MOUTH DAILY   albuterol (PROVENTIL HFA,VENTOLIN HFA) 90 mcg/act inhaler  Self No No   Sig: Inhale 2 puffs every 6 (six) hours as needed for wheezing or shortness of breath   budesonide (PULMICORT) 0.5 mg/2 mL nebulizer solution  Self No No   Sig: TAKE 2 ML (0.5 MG TOTAL) BY NEBULIZATION TWICE A DAY RINSE MOUTH AFTER USE   ipratropium-albuterol (DUO-NEB) 0.5-2.5 mg/3 mL nebulizer solution  Self No No   Sig: Take 3 mL by nebulization 4 (four) times a day   losartan (COZAAR) 25 mg tablet  Self No No   Sig: TAKE 1/2 TABLET BY MOUTH EVERY DAY   predniSONE 10 mg tablet  Self No No   Sig: Take 1 tablet (10 mg total) by mouth daily   torsemide (DEMADEX) 20 mg tablet  Self No No   Sig: Take 3 tablets (60 mg total) by mouth daily for 4 days, THEN 2 tablets (40 mg total) daily.      Facility-Administered Medications: None     Discharge Medication List as of 6/19/2025 10:12 PM        START taking these medications    Details   azithromycin (ZITHROMAX) 500 MG tablet Take 1 tablet (500 mg total) by mouth every 24 hours for 3 days Do not start before June 20, 2025., Starting Fri 6/20/2025, Until Mon 6/23/2025, Normal           CONTINUE these medications which have CHANGED     Details   ipratropium-albuterol (DUO-NEB) 0.5-2.5 mg/3 mL nebulizer solution Take 3 mL by nebulization 4 (four) times a day, Starting Thu 6/19/2025, Normal      predniSONE 10 mg tablet Multiple Dosages:Starting Thu 6/19/2025, Until Sat 6/21/2025 at 2359, THEN Starting Sun 6/22/2025, Until Tue 6/24/2025 at 2359, THEN Starting Wed 6/25/2025, Until Fri 6/27/2025 at 2359, THEN Starting Sat 6/28/2025, Until Mon 6/30/2025 at 2359Take 4  tablets (40 mg total) by mouth daily for 3 days, THEN 3 tablets (30 mg total) daily for 3 days, THEN 2 tablets (20 mg total) daily for 3 days, THEN 1 tablet (10 mg total) daily for 3 days., Normal           CONTINUE these medications which have NOT CHANGED    Details   albuterol (PROVENTIL HFA,VENTOLIN HFA) 90 mcg/act inhaler Inhale 2 puffs every 6 (six) hours as needed for wheezing or shortness of breath, Starting Tue 4/22/2025, Normal      Aspirin Low Dose 81 MG chewable tablet CHEW 1 TABLET BY MOUTH DAILY, Starting Mon 4/28/2025, Normal      budesonide (PULMICORT) 0.5 mg/2 mL nebulizer solution TAKE 2 ML (0.5 MG TOTAL) BY NEBULIZATION TWICE A DAY RINSE MOUTH AFTER USE, Normal      losartan (COZAAR) 25 mg tablet TAKE 1/2 TABLET BY MOUTH EVERY DAY, Starting Mon 4/28/2025, NormalPaused since today until manually resumed           STOP taking these medications       torsemide (DEMADEX) 20 mg tablet Comments:   Reason for Stopping:             Outpatient Discharge Orders   Discharge Diet     ED SEPSIS DOCUMENTATION   Time reflects when diagnosis was documented in both MDM as applicable and the Disposition within this note       Time User Action Codes Description Comment    6/15/2025 10:20 PM Connor Ambrocio [R06.89] Hypercarbia     6/15/2025 10:20 PM Connor Ambrocio [R41.82] Altered mental status     6/15/2025 10:20 PM Connor Ambrocio [J44.9] COPD (chronic obstructive pulmonary disease) (HCC)     6/15/2025 11:05 PM Jorge Luis Ford Add [Z71.89] Goals of care, counseling/discussion      6/17/2025  7:16 PM Jorge Luis Back Add [R06.03] Respiratory distress     6/19/2025  2:09 PM Bubba Velasquez [J44.9] End stage COPD (HCC)            Initial Sepsis Screening       Row Name 06/17/25 0943                Is the patient's history suggestive of a new or worsening infection? No  -JJ                  User Key  (r) = Recorded By, (t) = Taken By, (c) = Cosigned By      Initials Name Provider Type    DARYL Velasquez MD Physician                           [1]   Past Medical History:  Diagnosis Date    Acute metabolic encephalopathy 03/29/2022    Arthritis     Bladder mass     Cardiomyopathy (HCC)     Chest pain     COPD (chronic obstructive pulmonary disease) ([facility])     COPD exacerbation ([facility]) 04/28/2023    COVID-19 virus infection 02/23/2023    CPAP (continuous positive airway pressure) dependence     Dependence on respirator (ventilator) status ([facility]) 01/10/2024    Emphysema of lung ([facility])     Hypoxia     nocturnal    Left bundle branch block     Macrocytosis without anemia 05/23/2019    Multiple pulmonary nodules     last assessed: 10/12/16    Osteoarthritis 08/03/2013    Pneumonia     Sleep apnea, obstructive     Smoker     Steroid-induced hyperglycemia 03/30/2023    Weight loss 08/29/2019   [2]   Past Surgical History:  Procedure Laterality Date    COLONOSCOPY      CYSTOSCOPY      CYSTOSCOPY  02/17/2021    WA BLADDER INSTILLATION ANTICARCINOGENIC AGENT N/A 1/3/2020    Procedure: INSTILLATION MITOMYCIN;  Surgeon: Sundeep Canchola MD;  Location: [facility];  Service: Urology    WA CYSTO W/REMOVAL OF LESIONS SMALL N/A 1/3/2020    Procedure: TRANSURETHRAL RESECTION OF BLADDER TUMOR (TURBT);  Surgeon: Sundeep Canchola MD;  Location: [facility];  Service: Urology    WA CYSTOURETHROSCOPY WITH BIOPSY N/A 4/13/2021    Procedure: CYSTOSCOPY WITH BIOPSIES;  Surgeon: Sundeep Canchola MD;  Location: [facility];  Service: Urology    WA TRURL ELECTROSURG RESCJ PROSTATE BLEED COMPLETE N/A 4/13/2021    Procedure:  TRANSURETHRAL RESECTION OF PROSTATE (TURP) BLADDER BIOPSY, FULGURATION;  Surgeon: Sundeep Canchola MD;  Location: [facility];  Service: Urology   [3]   Family History  Problem Relation Name Age of Onset    Diabetes Mother     [4]   Social History  Tobacco Use    Smoking status: Former     Current packs/day: 0.00     Average packs/day: 2.5 packs/day for 42.0 years (105.0 ttl pk-yrs)     Types: Cigarettes     Start date:      Quit date:      Years since quittin.4    Smokeless tobacco: Former    Tobacco comments:     1 ppd for 37 years, 2010 down to 5 cigs a day, is around second hand smoke   Vaping Use    Vaping status: Never Used   Substance Use Topics    Alcohol use: Not Currently     Comment: rarely    Drug use: No        Yogesh Ward MD  [phone]

## 2025-06-16 NOTE — UTILIZATION REVIEW
Initial Clinical Review    Admission: Date/Time/Statement:   Admission Orders (From admission, onward)       Ordered        06/15/25 2220  INPATIENT ADMISSION  Once                          Orders Placed This Encounter   Procedures    INPATIENT ADMISSION     Standing Status:   Standing     Number of Occurrences:   1     Level of Care:   Level 2 Stepdown / HOT [14]     Estimated length of stay:   More than 2 Midnights     Certification:   I certify that inpatient services are medically necessary for this patient for a duration of greater than two midnights. See H&P and MD Progress Notes for additional information about the patient's course of treatment.     ED Arrival Information       Expected   6/15/2025     Arrival   6/15/2025 18:37    Acuity   Emergent              Means of arrival   Ambulance    Escorted by   [facility]    Service   Hospitalist    Admission type   Emergency              Arrival complaint   -             Chief Complaint   Patient presents with    Lethargy     Pt presents from home, waxing and waning LOC, hx of COPD, CHF, when aroused pt offers no complaints, denies chest pain, denies SOB. 4L at baseline. Abdominal distention, congested cough       Initial Presentation: 70 y.o. male with Pmhx of  chronic hypoxic and hypercapnic respiratory failure, end-stage COPD, nonischemic cardiomyopathy with EF 25% to ED by ems with respiratory distress.    On presentation pt in acute distress, ill appearing. Severely cachectic. + respiratory distress, wheezing. Somnolent but arousable to sternal rub and able to answer questions with me saying them loudly at bedside. VBG 7.24/137/30/58. Placed on Bipap in ED.  WBC 10.87, Hgb 11.6. CXR with evidence of underlying severe emphysema possible volume overload more noted of the right side. Official read pending.  Patient given nebulizers, IV Solu-Medrol as well as IV azithromycin and continuous BiPAP overnight with repeat VBG this AM  7.35/101/34/55.  Admitted Inpatient to [facility] 2 Stepdown Unit with Acute on chronic respiratory failure with hypoxia and hypercapnia. -- continue BiPAP. Walter nebs, IV steroids and IV azithromycin. Unable to trial a dose of IV diuretic currently as may additionally have component of CHF but if pressures trend up may trial. Consult pulmonology and palliative care. Continue PTA po meds.     Anticipated Length of Stay/Certification Statement: Patient will be admitted on an inpatient basis with an anticipated length of stay of greater than 2 midnights secondary to Respiratory Failure, End Stage COPD.     Date: 6/16   Day 2:   Pulmonology consult -- Very severe chronic obstructive pulmonary disease.  With declining pulmonary status lately and recurrent admissions to the hospital. Pulmonary cachexia d/t COPD.  Plan: - GOC discussion as the pt fails to recover.  The pt and his wife are agreeable to see hospice and what options of care entail again gone down around. Continue steroids. Bronchodilators. BiPAP prn. O2 to maintain O2 sat > 88%.       ED Treatment-Medication Administration from 06/15/2025 1837 to 06/16/2025 0828         Date/Time Order Dose Route Action     06/15/2025 1941 albuterol inhalation solution 10 mg 10 mg Nebulization Given     06/15/2025 1941 ipratropium (ATROVENT) 0.02 % inhalation solution 1 mg 1 mg Nebulization Given     06/15/2025 1941 sodium chloride 0.9 % inhalation solution 12 mL 12 mL Nebulization Given     06/15/2025 2022 sodium chloride 0.9 % bolus 250 mL 250 mL Intravenous New Bag     06/16/2025 0758 budesonide (PULMICORT) inhalation solution 0.5 mg 0.5 mg Nebulization Given     06/16/2025 0758 levalbuterol (XOPENEX) inhalation solution 1.25 mg 1.25 mg Nebulization Given     06/16/2025 0758 ipratropium (ATROVENT) 0.02 % inhalation solution 0.5 mg 0.5 mg Nebulization Given     06/15/2025 2306 methylPREDNISolone sodium succinate (Solu-MEDROL) injection 40 mg 40 mg Intravenous Given     06/15/2025  2308 azithromycin (ZITHROMAX) 500 mg in sodium chloride 0.9% 250mL IVPB 500 mg 500 mg Intravenous New Bag     06/16/2025 0225 sodium chloride 0.9 % bolus 500 mL 500 mL Intravenous New Bag       Scheduled Medications:  aspirin, 81 mg, Oral, Daily  azithromycin, 500 mg, Intravenous, Q24H  budesonide, 0.5 mg, Nebulization, Q12H  ipratropium, 0.5 mg, Nebulization, TID  levalbuterol, 1.25 mg, Nebulization, TID  methylPREDNISolone sodium succinate, 40 mg, Intravenous, Q12H [facility]         ED Triage Vitals   Temperature Pulse Respirations Blood Pressure SpO2 Pain Score   06/15/25 1847 06/15/25 1846 06/15/25 1846 06/15/25 1846 06/15/25 1847 06/15/25 1846   98.6 °F (37 °C) 86 (!) 40 112/67 96 % No Pain     Weight (last 2 days)       Date/Time Weight    06/16/25 08:44:58 42.9 (94.5)            Vital Signs (last 3 days)       Date/Time Temp Pulse Resp BP MAP (mmHg) SpO2 Calculated FIO2 (%) - Nasal Cannula Nasal Cannula O2 Flow Rate (L/min) O2 Device O2 Interface Device Patient Position - Orthostatic VS Pain    06/16/25 1339 -- -- -- -- -- 98 % -- -- -- Face mask -- --    06/16/25 1300 -- -- -- -- -- -- 44 6 L/min Nasal cannula -- -- No Pain    06/16/25 1206 -- -- -- -- -- -- 44 6 L/min Nasal cannula -- -- --    06/16/25 11:23:54 97.4 °F (36.3 °C) 66 14 107/64 78 99 % -- -- -- -- -- --    06/16/25 0900 -- -- -- -- -- -- -- -- BiPAP -- -- No Pain    06/16/25 08:44:58 97.3 °F (36.3 °C) 72 17 99/59 72 99 % -- -- -- -- -- --    06/16/25 0800 -- 74 22 101/58 72 100 % -- -- BiPAP -- -- --    06/16/25 0758 -- -- -- -- -- -- -- -- -- Face mask -- --    06/16/25 0730 -- 62 22 91/54 67 100 % -- -- BiPAP -- -- --    06/16/25 0700 -- -- -- -- -- -- -- -- -- -- -- No Pain    06/16/25 0600 -- 62 24 92/52 67 98 % -- -- BiPAP -- Lying --    06/16/25 0415 -- 68 17 94/51 67 99 % -- -- BiPAP -- -- --    06/16/25 0339 -- 72 18 105/55 72 -- -- -- -- -- Lying --    06/16/25 0330 -- 72 17 84/52  63  -- -- -- -- -- -- --    06/16/25 0300 -- 72 18 90/52 68  -- -- -- -- -- Lying --    06/16/25 0215 -- 84 16 87/55  67 -- -- -- -- -- -- --    06/16/25 0130 -- 82 17 89/54 66 -- -- -- -- -- Lying --    06/16/25 0100 -- 76 18 94/51 66 -- -- -- -- -- Lying --    06/16/25 0045 -- 72 18 77/47  59  -- -- -- -- -- -- --    06/16/25 0019 -- 86 18 93/60 72 -- -- -- -- -- Lying --    06/16/25 0015 -- 80 20 89/57 69 -- -- -- BiPAP -- Lying --    06/15/25 2230 -- 68 23 97/57 72 92 % -- -- -- -- -- --    06/15/25 2215 -- 72 27 98/60 75 93 % -- -- -- -- -- --    06/15/25 2200 -- 72 34 112/58 77 98 % -- -- BiPAP -- -- --    06/15/25 2145 -- 84 33 106/65 78 -- -- -- -- -- -- --    06/15/25 2115 -- 76 32 109/63 82 97 % -- -- BiPAP -- -- --    06/15/25 2045 -- 72 33 105/72 84 97 % -- -- -- -- -- --    06/15/25 2030 -- 70 30 100/66 79 98 % -- -- -- -- -- --    06/15/25 2023 -- 70 30 99/61 74 99 % -- -- BiPAP -- -- --    06/15/25 2015 -- 70 31 82/53 64 99 % -- -- BiPAP -- -- --    06/15/25 2014 -- -- -- -- -- -- -- -- -- Face mask -- --    06/15/25 2000 -- 76 37 106/62 78 100 % -- -- BiPAP -- Sitting No Pain    06/15/25 1847 98.6 °F (37 °C) -- -- -- -- 96 % 36 4 L/min Nasal cannula -- -- --    06/15/25 1846 -- 86 40 112/67 -- -- -- -- -- -- Sitting No Pain            Pertinent Labs/Diagnostic Test Results:   Radiology:  XR chest portable   ED Interpretation by Yogesh Ward MD (06/15 2020)   Redemonstrated pulmonary vascular congestion.      Final Interpretation by Alexandra Swift MD (06/16 0931)      Mild pulmonary venous congestion.            Workstation performed: [id number]           Cardiology:  ECG 12 lead   Final Result by Noy Melendez MD (06/15 6523)   Sinus rhythm with occasional Premature ventricular complexes   Left bundle branch block   Abnormal ECG   When compared with ECG of 17-Mar-2025 13:37,   Premature ventricular complexes are now Present   Confirmed by Noy Melendez ([id number]) on 6/15/2025 10:57:20 PM        Results from last 7 days   Lab Units 06/16/25  0612  06/15/25  1856   WBC Thousand/uL 5.39 10.87*   HEMOGLOBIN g/dL 10.3* 11.6*   HEMATOCRIT % 35.1* 39.1   PLATELETS Thousands/uL 200 246     Results from last 7 days   Lab Units 06/16/25  0612 06/15/25  1856   SODIUM mmol/L 139 137   POTASSIUM mmol/L 4.2 3.9   CHLORIDE mmol/L 86* 80*   CO2 mmol/L >45* >45*   BUN mg/dL 25 25   CREATININE mg/dL 0.76 0.87   EGFR ml/min/1.73sq m 93 88   CALCIUM mg/dL 8.4 9.1   MAGNESIUM mg/dL 2.0  --      Results from last 7 days   Lab Units 06/16/25  0612 06/15/25  1856   AST U/L 20 24   ALT U/L 11 14   ALK PHOS U/L 57 70   TOTAL PROTEIN g/dL 6.0* 7.1   ALBUMIN g/dL 3.6 4.3   TOTAL BILIRUBIN mg/dL 0.59 0.54     Results from last 7 days   Lab Units 06/16/25  1124 06/16/25  0754   POC GLUCOSE mg/dl 127 145*     Results from last 7 days   Lab Units 06/16/25  0612 06/15/25  1856   GLUCOSE RANDOM mg/dL 147* 113     Results from last 7 days   Lab Units 06/16/25  0754 06/16/25 0215 06/15/25  2155   PH JHONATHAN  7.252* 7.354 7.263*   PCO2 JHONATHAN mm Hg 122.2* 101.8* 121.2*   PO2 JHONATHAN mm Hg 26.6* 34.0* 25.5*   HCO3 JHONATHAN mmol/L 52.6* 55.4* 53.5*   BASE EXC JHONATHAN mmol/L 20.6 24.8 21.4   O2 CONTENT JHONATHAN ml/dL 6.3 10.0 6.1   O2 HGB, VENOUS % 40.3* 61.7 38.3*       Results from last 7 days   Lab Units 06/15/25  2255 06/15/25  2046 06/15/25  1856   HS TNI 0HR ng/L  --   --  45   HS TNI 2HR ng/L  --  40  --    HSTNI D2 ng/L  --  -5  --    HS TNI 4HR ng/L 38  --   --    HSTNI D4 ng/L -7  --   --          Results from last 7 days   Lab Units 06/16/25  0612   PROTIME seconds 11.3*   INR  0.79*   PTT seconds 25       Past Medical History[1]  Present on Admission:   Acute on chronic respiratory failure with hypoxia and hypercapnia (HCC)   End stage COPD (HCC)   Nonischemic cardiomyopathy (HCC)   Pulmonary cachexia due to COPD (HCC)      Admitting Diagnosis: COPD (chronic obstructive pulmonary disease) (HCC) [J44.9]  Hypercarbia [R06.89]  Altered mental status [R41.82]  Lethargy [R53.83]  Goals of care,  counseling/discussion [Z71.89]  Age/Sex: 70 y.o. male    Network Utilization Review Department  ATTENTION: Please call with any questions or concerns to [phone] and carefully listen to the prompts so that you are directed to the right person. All voicemails are confidential.   For Discharge needs, contact Care Management DC Support Team at [phone] opt. [phone]  Send all requests for admission clinical reviews, approved or denied determinations and any other requests to dedicated fax number below belonging to the [facility] where the patient is receiving treatment. List of dedicated fax numbers for the Facilities:  FACILITY NAME UR FAX NUMBER   ADMISSION DENIALS (Administrative/Medical Necessity) [phone]   DISCHARGE SUPPORT TEAM (NETWORK) [phone]   PARENT CHILD HEALTH (Maternity/NICU/Pediatrics) [phone]   General acute hospital [phone]   [facility] [phone]   [facility] [phone]   [facility] [phone]   [facility] [phone]   [facility] [phone]   General acute hospital [phone]   [facility] [phone]   [facility] [phone]   [facility] [phone]   [facility] [phone]   [facility] [phone]              [1]   Past Medical History:  Diagnosis Date    Acute metabolic encephalopathy 03/29/2022    Arthritis     Bladder mass     Cardiomyopathy (HCC)     Chest pain     COPD (chronic obstructive pulmonary disease) ([facility])     COPD exacerbation ([facility]) 04/28/2023    COVID-19 virus infection 02/23/2023    CPAP (continuous positive airway pressure) dependence     Dependence on respirator (ventilator) status ([facility]) 01/10/2024    Emphysema of lung (HCC)      Hypoxia     nocturnal    Left bundle branch block     Macrocytosis without anemia 05/23/2019    Multiple pulmonary nodules     last assessed: 10/12/16    Osteoarthritis 08/03/2013    Pneumonia     Sleep apnea, obstructive     Smoker     Steroid-induced hyperglycemia 03/30/2023    Weight loss 08/29/2019

## 2025-06-16 NOTE — ASSESSMENT & PLAN NOTE
Appreciate pulmonology input  Patient will continue to use BiPAP at bedtime and as needed during the day.  Per pulmonary will use as needed wakefulness in setting of CO2 retention.  Continued nebulized therapy and treatment of COPD exacerbation  Discussed nature of readmissions increasing in frequency and severity

## 2025-06-16 NOTE — CASE MANAGEMENT
Case Management Assessment & Discharge Planning Note    Patient name Natalio Hartmann Jr.  Location [facility] 911/[facility] 911-01 MRN [id number]  : 1955 Date 2025       Current Admission Date: 6/15/2025  Current Admission Diagnosis:Acute on chronic respiratory failure with hypoxia and hypercapnia ([facility])   Patient Active Problem List    Diagnosis Date Noted    Acute on chronic respiratory failure with hypoxia and hypercapnia ([facility]) 2025    Hypotension 2025    Chronic respiratory failure with hypoxia and hypercapnia ([facility]) 2025    Iron deficiency anemia secondary to inadequate dietary iron intake 2024    SIADH (syndrome of inappropriate ADH production) ([facility]) 2024    Type 2 diabetes mellitus without complication, with long-term current use of insulin ([facility]) 2024    End stage COPD ([facility]) 2024    Severe protein-calorie malnutrition ([facility]) 10/14/2023    History of bladder cancer 2023    Pulmonary cachexia due to COPD ([facility])     Chronic hypercapnia/KEVIN 2023    Palliative care by specialist 2023    Alkalosis 2023    Malnutrition ([facility]) 06/10/2023    Hyperlipidemia 2023    Physical deconditioning 2023    Chronic anemia 2023    Hyponatremia 2023    Acute on chronic systolic congestive heart failure ([facility]) 2022    Pulmonary nodules/lesions, multiple 2022    Prostate cancer ([facility]) 2021    BPH with obstruction/lower urinary tract symptoms 2021    Goals of care, counseling/discussion 2021    Other insomnia 2019    GERD (gastroesophageal reflux disease) 2017    Nonobstructive atherosclerosis of coronary artery 2016    Mood disorder ([facility]) 2015    Prediabetes 2015    Osteoporosis 10/14/2014    Vitamin D deficiency 10/14/2014    Nonischemic cardiomyopathy ([facility]) 2014    Left bundle-branch block 2013    Obstructive sleep apnea 2013      LOS (days): 1  Geometric Mean LOS  (GMLOS) (days):   Days to GMLOS:     OBJECTIVE:    Risk of Unplanned Readmission Score: 19.94         Current admission status: Inpatient       Preferred Pharmacy:   [id number] - [place], [place] - [address]  [address]  [place] [address]  Phone: [phone] Fax: [phone]    Primary Care Provider: Fatmata Gustafson DO    Primary Insurance: BLUE CROSS  Secondary Insurance: MEDICARE    ASSESSMENT:  Active Health Care Proxies       [facility]ell[facility] Care Representative - Spouse   Primary Phone: [phone] (Mobile)  Home Phone: [phone]                 Patient Information  Admitted from:: Home  Mental Status: Other (Comment) (lethargic)  During Assessment patient was accompanied by: Spouse  Assessment information provided by:: Spouse  Primary Caregiver: Spouse  Support Systems: Spouse/significant other, Son  What [place] you [place]  Home entry access options. Select all that apply.: Stairs  Number of steps to enter home.: 1  Type of Current Residence: [facility]  Living Arrangements: Lives w/ Spouse/significant other, Lives w/ Son  Is patient a ?: No    Activities of Daily Living Prior to Admission  Functional Status: Assistance  Completes ADLs independently?: No  Level of ADL dependence: Assistance  Ambulates independently?: No  Level of ambulatory dependence: Assistance  Does patient use assisted devices?: Yes  Assisted Devices (DME) used: Home Oxygen concentrator, Portable Oxygen tanks, Walker, Wheelchair, Shower Chair, Hospital Bed, Other (Comment) (trilogy through [employer], uses every night)  DME Company Name (respiratory supplies): [employer]  O2 Rate(s): 4 L during day, trilogy at night  Does patient have a history of Outpatient Therapy (PT/OT)?: No  Does the patient have a history of Short-Term Rehab?: No  Does patient have a history of HHC?: No  Does patient currently have HHC?: No    Patient Information Continued  Income Source: SSI/SSD  Does patient  have prescription coverage?: Yes  Can the patient afford their medications and any related supplies (such as glucometers or test strips)?: Yes  Does patient receive dialysis treatments?: No  Does patient have a history of substance abuse?: No  Does patient have a history of Mental Health Diagnosis?: No    Means of Transportation  Means of Transport to Appts:: Family transport      DISCHARGE DETAILS:    Discharge planning discussed with:: Spouse Mellisa at bedside  Freedom of Choice: Yes  Comments - Freedom of Choice: Discussed FOC  CM contacted family/caregiver?: Yes (spouse at bedside)      Contacts  Patient Contacts: [place]  Relationship to Patient:: Family  Contact Method: In Person  Reason/Outcome: Continuity of Care, Emergency Contact, Discharge Planning    Other Referral/Resources/Interventions Provided:  Referral Comments: This CM introduced self and role to spouse, Mellisa at bedside, as patient was sleeping during time of assessment.  Patient resides with wife and son in [facility] style home, [address].  Wife and son assist patient with all ADLS, including bathing, dressing, chores, medications, etc.  Patient uses 4 L O2 during the day, and trilogy non invasive ventilator at night during sleep (has home concentrator and portable tanks at home).  Patient also has WC, walker, shower chair, and hospital bed at home.  Wife states patient can do 1 step into the home, but uses the railings, and has to take his time.  No history noted of STR, HH or OP therapy.

## 2025-06-16 NOTE — ASSESSMENT & PLAN NOTE
Last PFTs in 2020 revealing an FEV1 to FVC ratio of 30% with an FEV1 of 0.6 L, which is 22% predicted.  Patient is on all nebulized regimen at home.  Continue nebulizers for now  Continue steroids  Patient is aware he is at end-stage disease

## 2025-06-16 NOTE — ASSESSMENT & PLAN NOTE
Met with the patient, his wife, palliative care, his primary nurse to discuss his goals of care.  The patient became inflamed and irritated at her questioning, stated he would just like to go home.  His wife was visibly upset.  Continue with emotional support for his family  Patient is agreeable to meet with hospice  Palliative care following, appreciate recommendations

## 2025-06-16 NOTE — QUICK NOTE
-Extensive discussion with patient's wife as well as patient in ER Room regarding goals of care in setting of end stage COPD who presents with hypercapnic respiratory failure. Medical recommendation of hospice was made given patient's critically ill status. Patient and wife at bedside aware of recommendation. Patient and wife would like to continue bipap overnight despite minimal improvements in gas and my concerns with patient's overall comfort. We did discuss the concerns with further escalation of care to a ventilator/cpr given patient's frail state/end stage copd and patient as well as wife understanding of this. As such both report that patients code status should be DNR/DNI with that limit set.

## 2025-06-16 NOTE — SOCIAL WORK
Inpatient Social Work visit- Palliative Care:  Attempt        Palliative SW attempted to meet with patient today. SW appreciates the opportunity to provide Patient with emotional support and guidance while Patient continues to receive supportive care from the Palliative Team.     SW was not able to engage with Patient due to: Pt receiving bi-pap treatment.     Identified areas of need include: Support to family. Palliative SW assessment to identify needs, concerns, strengths, goals.     Revisit Patient to Provide:   Supportive listening, Emotional Support.      Support to spouse and family.       Palliative SW remains available to provide ongoing support and care coordination as needed and/or requested.

## 2025-06-16 NOTE — CONSULTS
PULMONOLOGY CONSULT NOTE     Name: Natalio Hartmnan Jr.   Age & Sex: 70 y.o. male   MRN: [id number]  Unit/Bed#: [facility] 911-01   Encounter: [id number]        Reason for consultation: Acute on chronic hypoxic respiratory failure    Requesting physician: Jorge Luis Ford DO     Assessment/plan:    Hypotension  Assessment & Plan  In the setting of nonischemic cardiomyopathy and other comorbidities.  Management per primary  Patient is agreeable to no further escalation in care    End stage COPD (HCC)  Assessment & Plan  Last PFTs in 2020 revealing an FEV1 to FVC ratio of 30% with an FEV1 of 0.6 L, which is 22% predicted.  Patient is on all nebulized regimen at home.  Continue nebulizers for now  Continue steroids  Patient is aware he is at end-stage disease    Pulmonary cachexia due to COPD (HCC)  Assessment & Plan  Malnutrition Findings:     BMI Findings:           Body mass index is 14.8 kg/m².     In the setting of very severe COPD.  Nutrition consult  Caution with feeding the patient given alterations in mentation and needing to wear BiPAP    Goals of care, counseling/discussion  Assessment & Plan  Met with the patient, his wife, palliative care, his primary nurse to discuss his goals of care.  The patient became inflamed and irritated at her questioning, stated he would just like to go home.  His wife was visibly upset.  Continue with emotional support for his family  Patient is agreeable to meet with hospice  Palliative care following, appreciate recommendations    Nonischemic cardiomyopathy (HCC)  Assessment & Plan  Echo in March 2025 with LVEF 25%.  On PTA torsemide 40 mg daily and losartan 12.5 mg daily  On hold given borderline blood pressures  Per primary    * Acute on chronic respiratory failure with hypoxia and hypercapnia (HCC)  Assessment & Plan  Presented to the ER with respiratory distress and placed on BiPAP. Patient with chronic hypoxic and hypercapnic respiratory failure related primarily to end-stage COPD  but also has nonischemic cardiomyopathy with EF 25%; Requires supplemental O2 24/7 as well as BiPAP at night as well as additionally has been needing to use it throughout the day at times.  Extensive conversation was had with the patient and his wife, they are aware that he is at end-stage disease  Patient himself is adamant that he wants to go home  Palliative care following, will meet with the hospice team  Emotional support given to the wife  Continue BiPAP with naps and at night, CO2 rising despite BiPAP  Continue with Solu-Medrol and around-the-clock nebulizers for now          History of Present Illness   HPI:  Nataloi Hartmann Jr. is a 70 y.o. male with a PMHx of     Labs personally reviewed.    Imaging personally reviewed.    PCP note reviewed.    Review of systems:  12 point review of systems was completed and was otherwise negative except as listed in HPI.      Historical Information   Past Medical History[1]  Past Surgical History[1]  Family History[1]    Social History    Social History:   Social History     Socioeconomic History    Marital status: /Civil Union     Spouse name: Not on file    Number of children: Not on file    Years of education: Not on file    Highest education level: Not on file   Occupational History    Not on file   Tobacco Use    Smoking status: Former     Current packs/day: 0.00     Average packs/day: 2.5 packs/day for 42.0 years (105.0 ttl pk-yrs)     Types: Cigarettes     Start date:      Quit date: 2012     Years since quittin.4    Smokeless tobacco: Former    Tobacco comments:     1 ppd for 37 years, 2010 down to 5 cigs a day, is around second hand smoke   Vaping Use    Vaping status: Never Used   Substance and Sexual Activity    Alcohol use: Not Currently     Comment: rarely    Drug use: No    Sexual activity: Not Currently     Partners: Female   Other Topics Concern    Not on file   Social History Narrative    Daily coffee consumption: 8 or more cups a day         Used to work in [employer].  On disability.     Social Drivers of Health     Financial Resource Strain: Not on file   Food Insecurity: No Food Insecurity (5/13/2025)    Nursing - Inadequate Food Risk Classification     Worried About Running Out of Food in the Last Year: Not on file     Ran Out of Food in the Last Year: Not on file     Ran Out of Food in the Last Year: Never true   Transportation Needs: No Transportation Needs (5/13/2025)    Nursing - Transportation Risk Classification     Lack of Transportation: Not on file     Lack of Transportation: No   Physical Activity: Not on file   Stress: Not on file   Social Connections: Not on file   Intimate Partner Violence: Unknown (5/13/2025)    Nursing IPS     Feels Physically and Emotionally Safe: Not on file     Physically Hurt by Someone: Not on file     Humiliated or Emotionally Abused by Someone: Not on file     Physically Hurt by Someone: No     Hurt or Threatened by Someone: No   Housing Stability: Unknown (5/13/2025)    Nursing: Inadequate Housing Risk Classification     Has Housing: Not on file     Worried About Losing Housing: Not on file     Unable to Get Utilities: Not on file     Unable to Pay for Housing in the Last Year: No     Has Housing: No       Occupational History: Noncontributory    Meds/Allergies     Current Facility-Administered Medications:     aspirin chewable tablet 81 mg, Daily    azithromycin (ZITHROMAX) 500 mg in sodium chloride 0.9% 250mL IVPB 500 mg, Q24H, Last Rate: Stopped (06/16/25 0008)    budesonide (PULMICORT) inhalation solution 0.5 mg, Q12H    ipratropium (ATROVENT) 0.02 % inhalation solution 0.5 mg, TID    levalbuterol (XOPENEX) inhalation solution 1.25 mg, TID    methylPREDNISolone sodium succinate (Solu-MEDROL) injection 40 mg, Q12H GABE    Medications Prior to Admission:     albuterol (PROVENTIL HFA,VENTOLIN HFA) 90 mcg/act inhaler    Aspirin Low Dose 81 MG chewable tablet    budesonide (PULMICORT) 0.5 mg/2 mL  "nebulizer solution    ipratropium-albuterol (DUO-NEB) 0.5-2.5 mg/3 mL nebulizer solution    losartan (COZAAR) 25 mg tablet    predniSONE 10 mg tablet    torsemide (DEMADEX) 20 mg tablet  Allergies[2]    Objective    Vitals: Blood pressure 99/59, pulse 72, temperature (!) 97.3 °F (36.3 °C), resp. rate 17, height 5' 7\" (1.702 m), weight 42.9 kg (94 lb 8 oz), SpO2 99%., Body mass index is 14.8 kg/m².    No intake or output data in the 24 hours ending 06/16/25 1116    Physical Exam  Vitals reviewed.   Constitutional:       General: He is not in acute distress.     Comments: Cachectic   HENT:      Head: Normocephalic and atraumatic.      Right Ear: External ear normal.      Left Ear: External ear normal.      Nose: Nose normal.      Mouth/Throat:      Mouth: Mucous membranes are moist.      Pharynx: Oropharynx is clear.     Eyes:      Extraocular Movements: Extraocular movements intact.      Pupils: Pupils are equal, round, and reactive to light.       Cardiovascular:      Rate and Rhythm: Normal rate and regular rhythm.      Pulses: Normal pulses.      Heart sounds: Normal heart sounds.   Pulmonary:      Effort: Pulmonary effort is normal.      Breath sounds: Rhonchi present.   Abdominal:      General: Abdomen is flat. There is no distension.      Palpations: Abdomen is soft.      Tenderness: There is no abdominal tenderness.     Musculoskeletal:      Right lower leg: No edema.      Left lower leg: No edema.     Skin:     General: Skin is warm and dry.      Capillary Refill: Capillary refill takes less than 2 seconds.     Neurological:      General: No focal deficit present.      Mental Status: He is alert and oriented to person, place, and time.     Psychiatric:         Mood and Affect: Mood normal.         Behavior: Behavior normal.         Labs: I have personally reviewed pertinent lab results.  Laboratory and Diagnostics  Results from last 7 days   Lab Units 06/16/25  0612 06/15/25  1856   WBC Thousand/uL 5.39 " 10.87*   HEMOGLOBIN g/dL 10.3* 11.6*   HEMATOCRIT % 35.1* 39.1   PLATELETS Thousands/uL 200 246   MONO PCT % 0* 4   EOS PCT % 0 0     Results from last 7 days   Lab Units 06/16/25  0612 06/15/25  1856   SODIUM mmol/L 139 137   POTASSIUM mmol/L 4.2 3.9   CHLORIDE mmol/L 86* 80*   CO2 mmol/L >45* >45*   BUN mg/dL 25 25   CREATININE mg/dL 0.76 0.87   CALCIUM mg/dL 8.4 9.1   GLUCOSE RANDOM mg/dL 147* 113   ALT U/L 11 14   AST U/L 20 24   ALK PHOS U/L 57 70   ALBUMIN g/dL 3.6 4.3   TOTAL BILIRUBIN mg/dL 0.59 0.54     Results from last 7 days   Lab Units 06/16/25  0612   MAGNESIUM mg/dL 2.0      Results from last 7 days   Lab Units 06/16/25  0612   INR  0.79*   PTT seconds 25      Imaging and other studies: Results Review Statement: I reviewed radiology reports from this admission including: chest xray.    Pulmonary function testing:     Date test performed: 9/16/2020      Date of test interpretation: 9/17/2020     Requesting Physician: Dr. Babin      Reason for Testing: COPD      Reference set for interpretation: [id number]     Procedure: The patient was taken to pulmonary function testing laboratory.  The patient demonstrated good effort and cooperation.  The results of this test meet ATS standards for acceptability and repeatability.       DLCO was corrected for patient's Hb     Results:  FEV1/FVC Ratio: 30 %  Forced Vital Capacity: 2.00 L    59 % predicted  FEV1: 0.60 L22 % predicted        After administration of bronchodilator   FVC: 2.43 L, 72 % predicted, 21 % change  FEV1: 0.66 L, 25 % predicted, 11 % change     Lung volumes by body plethysmography:   Total Lung Capacity 124 % predicted   Residual volume 243 % predicted     DLCO corrected for patients hemoglobin level: 45 %     Interpretation:     Very severe obstructive airflow defect on spirometry     Significant improvement in airflow or forced vital capacity in response to the administration of bronchodilator per ATS standards.     Air trapping and  "hyperinflation as indicated by the lung volumes     Moderate decrease in diffusion capacity     Flow-volume loop consistent with obstruction        6 minute walk test   The patient was 95% on room air at rest and resting HR was 95bpm with dyspnea scale of 4/10. He completed 274m of walking with the lowest saturation at 87% requiring 2lpm of oxygen for which the lowest O2 saturation was 90%. End of test HR was 129bpm and dyspnea scale was 9/10.      EKG, Pathology, and Other Studies: Results Review Statement: I reviewed radiology reports from this admission including: chest xray.    Code Status: Level 3 - DNAR and DNI    VTE Pharmacologic Prophylaxis: VTE covered by:    None     VTE Mechanical Prophylaxis: sequential compression device    Disclaimer: Portions of the record may have been created with voice recognition software. Occasional wrong word or \"sound a like\" substitutions may have occurred due to the inherent limitations of voice recognition software. Careful consideration should be taken to recognize, using context, where substitutions have occurred.    Kamron Ye DO, MS  Pulmonary/Critical Care Fellowship PGY-V  [facility]         [1]   Past Medical History:  Diagnosis Date    Acute metabolic encephalopathy 03/29/2022    Arthritis     Bladder mass     Cardiomyopathy (HCC)     Chest pain     COPD (chronic obstructive pulmonary disease) ([facility])     COPD exacerbation ([facility]) 04/28/2023    COVID-19 virus infection 02/23/2023    CPAP (continuous positive airway pressure) dependence     Dependence on respirator (ventilator) status ([facility]) 01/10/2024    Emphysema of lung ([facility])     Hypoxia     nocturnal    Left bundle branch block     Macrocytosis without anemia 05/23/2019    Multiple pulmonary nodules     last assessed: 10/12/16    Osteoarthritis 08/03/2013    Pneumonia     Sleep apnea, obstructive     Smoker     Steroid-induced hyperglycemia 03/30/2023    Weight loss " 08/29/2019   [1]   Past Surgical History:  Procedure Laterality Date    COLONOSCOPY      CYSTOSCOPY      CYSTOSCOPY  02/17/2021    ID BLADDER INSTILLATION ANTICARCINOGENIC AGENT N/A 1/3/2020    Procedure: INSTILLATION MITOMYCIN;  Surgeon: Sundeep Canchola MD;  Location: [facility];  Service: Urology    ID CYSTO W/REMOVAL OF LESIONS SMALL N/A 1/3/2020    Procedure: TRANSURETHRAL RESECTION OF BLADDER TUMOR (TURBT);  Surgeon: Sundeep Canchola MD;  Location: [facility];  Service: Urology    ID CYSTOURETHROSCOPY WITH BIOPSY N/A 4/13/2021    Procedure: CYSTOSCOPY WITH BIOPSIES;  Surgeon: Sundeep Canchola MD;  Location: [facility];  Service: Urology    ID TRURL ELECTROSURG RESCJ PROSTATE BLEED COMPLETE N/A 4/13/2021    Procedure: TRANSURETHRAL RESECTION OF PROSTATE (TURP) BLADDER BIOPSY, FULGURATION;  Surgeon: Sundeep Canchola MD;  Location: [facility];  Service: Urology   [1]   Family History  Problem Relation Name Age of Onset    Diabetes Mother     [2] No Known Allergies

## 2025-06-16 NOTE — ED NOTES
Pt cleaned, changed, transferred to hospital bed, provided warm blankets and put music on the TV per his request. Pt now sleeping comfortably in bed. Respirations noted.     Hortensia Mcclelland RN  06/16/25 8024

## 2025-06-16 NOTE — ASSESSMENT & PLAN NOTE
Follows with Dr. Luque with pulmonology outpatient and reviewed her note from just 2 days ago which makes this diagnosis quite clear  On supplemental O2 24/7 as well as trilogy noninvasive ventilator at night as well as additionally it appears needing to use this trilogy sometimes during the day  Thought to be main driving force behind his admission for respiratory failure today with possible additional component of CHF  Treating with scheduled nebulizers as well as IV Solu-Medrol and azithromycin  BiPAP continuous  Pulmonary consultation   Message sent to Dr Cruz to notify her that a new H/P is needed for the surgery on 10/22/24.

## 2025-06-16 NOTE — ASSESSMENT & PLAN NOTE
Malnutrition Findings: BMI 17, severe cachexia                                BMI Findings:           There is no height or weight on file to calculate BMI.

## 2025-06-16 NOTE — ASSESSMENT & PLAN NOTE
Malnutrition Findings:     BMI Findings:           Body mass index is 14.8 kg/m².     In the setting of very severe COPD.  Nutrition consult  Caution with feeding the patient given alterations in mentation and needing to wear BiPAP

## 2025-06-16 NOTE — ASSESSMENT & PLAN NOTE
Echo in March 2025 with LVEF 25%  On PTA torsemide 40 mg daily and losartan 12.5 mg daily  Due to patient's low blood pressure at baseline and noted systolic blood pressures in the high 90s, holding PTA torsemide as well as losartan.   Patient may additionally have a component of CHF going on for which IV diuresis was considered but unable currently given SBP in the 90's.

## 2025-06-16 NOTE — ASSESSMENT & PLAN NOTE
In the setting of nonischemic cardiomyopathy and other comorbidities.  Management per primary  Patient is agreeable to no further escalation in care

## 2025-06-17 ENCOUNTER — HOME CARE VISIT (OUTPATIENT)
Dept: HOME HEALTH SERVICES | Facility: HOME HEALTHCARE | Age: 70
End: 2025-06-17

## 2025-06-17 LAB
BASE EX.OXY STD BLDV CALC-SCNC: 97.1 % (ref 60–80)
BASE EXCESS BLDV CALC-SCNC: 17.9 MMOL/L
GLUCOSE SERPL-MCNC: 101 MG/DL (ref 65–140)
GLUCOSE SERPL-MCNC: 119 MG/DL (ref 65–140)
GLUCOSE SERPL-MCNC: 186 MG/DL (ref 65–140)
GLUCOSE SERPL-MCNC: 219 MG/DL (ref 65–140)
GLUCOSE SERPL-MCNC: 229 MG/DL (ref 65–140)
HCO3 BLDV-SCNC: 46.8 MMOL/L (ref 24–30)
O2 CT BLDV-SCNC: 15.4 ML/DL
PCO2 BLDV: 84.2 MM HG (ref 42–50)
PH BLDV: 7.36 [PH] (ref 7.3–7.4)
PO2 BLDV: 204.1 MM HG (ref 35–45)

## 2025-06-17 PROCEDURE — 94640 AIRWAY INHALATION TREATMENT: CPT

## 2025-06-17 PROCEDURE — 99232 SBSQ HOSP IP/OBS MODERATE 35: CPT | Performed by: INTERNAL MEDICINE

## 2025-06-17 PROCEDURE — 94664 DEMO&/EVAL PT USE INHALER: CPT

## 2025-06-17 PROCEDURE — 99232 SBSQ HOSP IP/OBS MODERATE 35: CPT | Performed by: PHYSICIAN ASSISTANT

## 2025-06-17 PROCEDURE — 82805 BLOOD GASES W/O2 SATURATION: CPT | Performed by: STUDENT IN AN ORGANIZED HEALTH CARE EDUCATION/TRAINING PROGRAM

## 2025-06-17 PROCEDURE — 94003 VENT MGMT INPAT SUBQ DAY: CPT

## 2025-06-17 PROCEDURE — 94760 N-INVAS EAR/PLS OXIMETRY 1: CPT

## 2025-06-17 PROCEDURE — 82948 REAGENT STRIP/BLOOD GLUCOSE: CPT

## 2025-06-17 PROCEDURE — 99233 SBSQ HOSP IP/OBS HIGH 50: CPT | Performed by: INTERNAL MEDICINE

## 2025-06-17 RX ORDER — PREDNISONE 20 MG/1
40 TABLET ORAL DAILY
Status: DISCONTINUED | OUTPATIENT
Start: 2025-06-18 | End: 2025-06-20 | Stop reason: HOSPADM

## 2025-06-17 RX ORDER — SIMETHICONE 80 MG
80 TABLET,CHEWABLE ORAL
Status: DISCONTINUED | OUTPATIENT
Start: 2025-06-17 | End: 2025-06-20 | Stop reason: HOSPADM

## 2025-06-17 RX ORDER — AZITHROMYCIN 250 MG/1
500 TABLET, FILM COATED ORAL EVERY 24 HOURS
Status: DISCONTINUED | OUTPATIENT
Start: 2025-06-17 | End: 2025-06-20 | Stop reason: HOSPADM

## 2025-06-17 RX ORDER — PREDNISONE 20 MG/1
20 TABLET ORAL DAILY
Status: DISCONTINUED | OUTPATIENT
Start: 2025-06-24 | End: 2025-06-20 | Stop reason: HOSPADM

## 2025-06-17 RX ORDER — SENNOSIDES 8.6 MG
2 TABLET ORAL ONCE
Status: DISCONTINUED | OUTPATIENT
Start: 2025-06-17 | End: 2025-06-20 | Stop reason: HOSPADM

## 2025-06-17 RX ORDER — PREDNISONE 10 MG/1
10 TABLET ORAL DAILY
Status: DISCONTINUED | OUTPATIENT
Start: 2025-06-27 | End: 2025-06-20 | Stop reason: HOSPADM

## 2025-06-17 RX ORDER — ENOXAPARIN SODIUM 100 MG/ML
40 INJECTION SUBCUTANEOUS
Status: DISCONTINUED | OUTPATIENT
Start: 2025-06-18 | End: 2025-06-20 | Stop reason: HOSPADM

## 2025-06-17 RX ADMIN — SIMETHICONE 80 MG: 80 TABLET, CHEWABLE ORAL at 21:43

## 2025-06-17 RX ADMIN — BUDESONIDE 0.5 MG: 0.5 INHALANT RESPIRATORY (INHALATION) at 07:44

## 2025-06-17 RX ADMIN — BUDESONIDE 0.5 MG: 0.5 INHALANT RESPIRATORY (INHALATION) at 20:56

## 2025-06-17 RX ADMIN — SIMETHICONE 80 MG: 80 TABLET, CHEWABLE ORAL at 15:05

## 2025-06-17 RX ADMIN — IPRATROPIUM BROMIDE 0.5 MG: 0.5 SOLUTION RESPIRATORY (INHALATION) at 14:34

## 2025-06-17 RX ADMIN — IPRATROPIUM BROMIDE 0.5 MG: 0.5 SOLUTION RESPIRATORY (INHALATION) at 07:44

## 2025-06-17 RX ADMIN — LEVALBUTEROL HYDROCHLORIDE 1.25 MG: 1.25 SOLUTION RESPIRATORY (INHALATION) at 07:44

## 2025-06-17 RX ADMIN — METHYLPREDNISOLONE SODIUM SUCCINATE 40 MG: 40 INJECTION, POWDER, FOR SOLUTION INTRAMUSCULAR; INTRAVENOUS at 08:57

## 2025-06-17 RX ADMIN — SIMETHICONE 80 MG: 80 TABLET, CHEWABLE ORAL at 11:44

## 2025-06-17 RX ADMIN — AZITHROMYCIN DIHYDRATE 500 MG: 250 TABLET ORAL at 21:43

## 2025-06-17 RX ADMIN — LEVALBUTEROL HYDROCHLORIDE 1.25 MG: 1.25 SOLUTION RESPIRATORY (INHALATION) at 14:34

## 2025-06-17 RX ADMIN — METHYLPREDNISOLONE SODIUM SUCCINATE 40 MG: 40 INJECTION, POWDER, FOR SOLUTION INTRAMUSCULAR; INTRAVENOUS at 21:43

## 2025-06-17 RX ADMIN — ASPIRIN 81 MG CHEWABLE TABLET 81 MG: 81 TABLET CHEWABLE at 08:57

## 2025-06-17 RX ADMIN — IPRATROPIUM BROMIDE 0.5 MG: 0.5 SOLUTION RESPIRATORY (INHALATION) at 20:56

## 2025-06-17 RX ADMIN — LEVALBUTEROL HYDROCHLORIDE 1.25 MG: 1.25 SOLUTION RESPIRATORY (INHALATION) at 20:56

## 2025-06-17 NOTE — HOSPICE NOTE
PC to pt wife who requests call back in 20 minutes.  pt approved RLOC which can be done at home or snf.  per Dr. Soni bipap machine is not covered under hospice services and it pt wants to continue it would need to be out of pocket.  PC to pt wife Mellisa.  Discussed hospice services and benefits by  guidelines.  Notified that hospice does not cover the bipap machine and would be out of pocket.  Wife requested a follow up visit to discuss.  Questions answered.  Plan to meet at pt bedside 6/18/25 at 3pm. Secure message sent to Randa Edgar Pa-C and Xiomy ZULUAGA

## 2025-06-17 NOTE — PROGRESS NOTES
Progress Note - Pulmonology   Name: Natalio Hartmann Jr. 70 y.o. male I MRN: [id number]  [facility] I Date of Admission: 6/15/2025   Date of Service: 6/17/2025 I Hospital Day: 2     Assessment & Plan  Acute on chronic respiratory failure with hypoxia and hypercapnia (HCC)  Presented to the ER with respiratory distress and placed on BiPAP. Patient with chronic hypoxic and hypercapnic respiratory failure related primarily to end-stage COPD but also has nonischemic cardiomyopathy with EF 25%; Requires supplemental O2 24/7 as well as BiPAP at night as well as additionally has been needing to use it throughout the day at times.  Extensive conversation was had with the patient and his wife, they are aware that he is at end-stage disease  Patient himself is adamant that he wants to go home  Palliative care following, will meet with the hospice team  Emotional support given to the wife  Continue BiPAP with naps and at night, CO2 rising despite BiPAP  Continue with Solu-Medrol and around-the-clock nebulizers for now  Nonischemic cardiomyopathy (HCC)  Echo in March 2025 with LVEF 25%.  On PTA torsemide 40 mg daily and losartan 12.5 mg daily  On hold given borderline blood pressures  Per primary  Goals of care, counseling/discussion  Met with the patient, his wife, palliative care, his primary nurse to discuss his goals of care.  The patient became inflamed and irritated at her questioning, stated he would just like to go home.  His wife was visibly upset.  Continue with emotional support for his family  Patient is agreeable to meet with hospice  Palliative care following, appreciate recommendations  Pulmonary cachexia due to COPD (HCC)  Malnutrition Findings:     BMI Findings:           Body mass index is 14.8 kg/m².     In the setting of very severe COPD.  Nutrition consult  Caution with feeding the patient given alterations in mentation and needing to wear BiPAP  End stage COPD (HCC)  Last PFTs in 2020 revealing an  FEV1 to FVC ratio of 30% with an FEV1 of 0.6 L, which is 22% predicted.  Patient is on all nebulized regimen at home.  Continue nebulizers for now  Continue steroids  Patient is aware he is at end-stage disease  Hypotension  In the setting of nonischemic cardiomyopathy and other comorbidities.  Management per primary  Patient is agreeable to no further escalation in care    24 Hour Events : Patient was able to wear BiPAP overnight.  Subjective : Patient feels closer to baseline this morning.  Respiratory status has improved.    Objective :  Temp:  [97.4 °F (36.3 °C)-98.9 °F (37.2 °C)] 98.9 °F (37.2 °C)  HR:  [63-97] 97  BP: (106-114)/(61-69) 111/69  Resp:  [14-36] 36  SpO2:  [98 %-100 %] 100 %  O2 Device: Nasal cannula  Nasal Cannula O2 Flow Rate (L/min):  [5 L/min-6 L/min] 5 L/min    Physical Exam  Vitals reviewed.   Constitutional:       Appearance: He is ill-appearing.   HENT:      Head: Normocephalic and atraumatic.      Right Ear: External ear normal.      Left Ear: External ear normal.      Nose: Nose normal.      Mouth/Throat:      Mouth: Mucous membranes are dry.     Cardiovascular:      Rate and Rhythm: Normal rate and regular rhythm.   Pulmonary:      Effort: Pulmonary effort is normal.      Comments: Decreased breath sounds bilaterally  Abdominal:      General: Abdomen is flat. There is no distension.      Palpations: Abdomen is soft.     Musculoskeletal:      Right lower leg: No edema.      Left lower leg: No edema.     Skin:     General: Skin is warm and dry.      Capillary Refill: Capillary refill takes less than 2 seconds.     Neurological:      General: No focal deficit present.      Mental Status: He is alert and oriented to person, place, and time.     Psychiatric:         Mood and Affect: Mood normal.         Behavior: Behavior normal.           Lab Results: I have reviewed the following results:   No new results in last 24 hours.  ABG: No new results in last 24 hours.    Imaging Results Review: I  reviewed radiology reports from this admission including: chest xray.  Other Study Results Review: EKG was reviewed.   PFT Results Reviewed: reviewed.    Kamron Ye D.O.  PGY-5, Pulmonary/Critical Care  [facility]

## 2025-06-17 NOTE — ASSESSMENT & PLAN NOTE
Presented to the ER with respiratory distress and placed on BiPAP  Chest x-ray reviewed with evidence of underlying severe emphysema possible volume overload more noted of the right side on my read awaiting official read   Patient with chronic hypoxic and hypercapnic respiratory failure related primarily to end-stage COPD but also has nonischemic cardiomyopathy with EF 25%; Requires supplemental O2 24/7 as well as BiPAP at night as well as additionally has been needing to use it throughout the day at times  VBG 7.24/137/30/58 initially before BIPAP in the ER.   VBG improved this morning but pco2 remains high resume bipap  Continue bronchodilators and IV steroids, start steroid taper when he improves  Pulmonology following

## 2025-06-17 NOTE — ASSESSMENT & PLAN NOTE
BP at baseline appears in the 100-110 systolic   Severe Cachexia   Given 750 cc overnight as opposed to more aggressive IVF given concurrent respiratory failure

## 2025-06-17 NOTE — SOCIAL WORK
[facility] SW met with patient/family to introduce the roll of [facility] SW in their care/treatment. Patient/family were given the opportunity to have their questions/concerns addressed. [facility] SW encouraged patient/family to reach out to SW as needed for support and/or resources as needed.      The [facility] SW met with the pt at bedside.  The pt wife was also present for the visit.  The [facility] SW reviewed the POA paperwork with the pt and his wife.  The pt wife questions were answered.  She shared that they would need to review the document and will complete it during another visit.  The pt wife shared that he may have some challenges with writing and she was informed the pt can receive some assistance.    The pt wife became tearful when discussing the document.  She was provided with some emotional support.  The pt wife was thankful for the visit.    I have spent 20 minutes with Patient and family today in which greater than 50% of this time was spent in counseling/coordination of care     Palliative  will follow-up as requested by patient, family, and primary team.  Please contact with any specific requests

## 2025-06-17 NOTE — PLAN OF CARE
Problem: Potential for Falls  Goal: Patient will remain free of falls  Description: INTERVENTIONS:  - Educate patient/family on patient safety including physical limitations  - Instruct patient to call for assistance with activity   - Consider consulting OT/PT to assist with strengthening/mobility based on AM PAC & -HLM score  - Consult OT/PT to assist with strengthening/mobility   - Keep Call bell within reach  - Keep bed low and locked with side rails adjusted as appropriate  - Keep care items and personal belongings within reach  - Initiate and maintain comfort rounds  - Make Fall Risk Sign visible to staff  - Offer Toileting every  Hours, in advance of need  - Initiate/Maintain alarm  - Obtain necessary fall risk management equipment:   - Apply yellow socks and bracelet for high fall risk patients  - Consider moving patient to room near nurses station  Outcome: Progressing     Problem: Prexisting or High Potential for Compromised Skin Integrity  Goal: Skin integrity is maintained or improved  Description: INTERVENTIONS:  - Identify patients at risk for skin breakdown  - Assess and monitor skin integrity including under and around medical devices   - Assess and monitor nutrition and hydration status  - Monitor labs  - Assess for incontinence   - Turn and reposition patient  - Assist with mobility/ambulation  - Relieve pressure over chuyita prominences   - Avoid friction and shearing  - Provide appropriate hygiene as needed including keeping skin clean and dry  - Evaluate need for skin moisturizer/barrier cream  - Collaborate with interdisciplinary team  - Patient/family teaching  - Consider wound care consult    Assess:  - Review Jose scale daily  - Clean and moisturize skin every   - Inspect skin when repositioning, toileting, and assisting with ADLS  - Assess under medical devices such as  every   - Assess extremities for adequate circulation and sensation     Bed Management:  - Have minimal linens on bed &  keep smooth, unwrinkled  - Change linens as needed when moist or perspiring  - Avoid sitting or lying in one position for more than  hours while in bed?Keep HOB at degrees   - Toileting:  - Offer bedside commode  - Assess for incontinence every   - Use incontinent care products after each incontinent episode such as     Activity:  - Mobilize patient  times a day  - Encourage activity and walks on unit  - Encourage or provide ROM exercises   - Turn and reposition patient every  Hours  - Use appropriate equipment to lift or move patient in bed  - Instruct/ Assist with weight shifting every  when out of bed in chair  - Consider limitation of chair time 1 hour intervals    Skin Care:  - Avoid use of baby powder, tape, friction and shearing, hot water or constrictive clothing  - Relieve pressure over bony prominences using   - Do not massage red bony areas    Next Steps:  - Teach patient strategies to minimize risks such as   - Consider consults to  interdisciplinary teams such as palliative    Outcome: Progressing      No

## 2025-06-17 NOTE — OCCUPATIONAL THERAPY NOTE
Occupational Therapy Cancellation Note        Patient Name: Natalio Hartmann Jr.  Today's Date: 6/17/2025    OT orders received, and chart reviewed. Per RN, pt is not medically appropriate to participate in therapy at this time. Pt w/ ongoing GoC and/or hospice discussion at this time. Will d/c pt from OT caseload. Please re-consult if needed/appropriate. Thank you.    NOLAN Walton, OTR/L

## 2025-06-17 NOTE — ASSESSMENT & PLAN NOTE
Follows with Dr. Luque with pulmonology outpatient and reviewed her note from just 2 days ago which makes this diagnosis quite clear  On supplemental O2 24/7 as well as trilogy noninvasive ventilator at night as well as additionally it appears needing to use this trilogy sometimes during the day  Thought to be main driving force behind his admission for respiratory failure today with possible additional component of CHF  Treating with scheduled nebulizers as well as IV Solu-Medrol and azithromycin  BiPAP PRN and HS  Pulmonary consultation

## 2025-06-17 NOTE — ED ATTENDING ATTESTATION
6/15/2025  I, Jorge Luis Back DO, saw and evaluated the patient. I have discussed the patient with the resident/non-physician practitioner and agree with the resident's/non-physician practitioner's findings, Plan of Care, and MDM as documented in the resident's/non-physician practitioner's note, except where noted. All available labs and Radiology studies were reviewed.  I was present for key portions of any procedure(s) performed by the resident/non-physician practitioner and I was immediately available to provide assistance.       At this point I agree with the current assessment done in the Emergency Department.  I have conducted an independent evaluation of this patient a history and physical is as follows:    70-year-old male presents for respiratory distress.  End-stage COPD.  Patient is accompanied by his family who provides most of the history.  Patient has been confused and lethargic today.  On arrival patient is tachypneic with accessory muscle use is frail and cachectic has wheezing.  Is somnolent.  No lower extremity edema noted pain anywhere as far as I can tell    Differential includes COPD exacerbation pneumonia doubt PE doubt overdose    Plan start BiPAP immediately check VBG check basic labs chest x-ray pneumonia continue to supply positive pressure ventilation for likely hypercarbia    ED Course  ED Course as of 06/17/25 1916   Sun Rory 15, 2025   2116 Patient resp effort decreased after being on bipap, will admit. Code status level 1         Critical Care Time  Procedures  Critical Care Time Statement: Upon my evaluation, this patient had a high probability of imminent or life-threatening deterioration due to hypoxia, which required my direct attention, intervention, and personal management.  I spent a total of 35 minutes directly providing critical care services, including ventilator management. This time is exclusive of procedures, teaching, treating other patients, family meetings, and any  prior time recorded by providers other than myself.

## 2025-06-17 NOTE — ASSESSMENT & PLAN NOTE
Malnutrition Findings: BMI 17, severe cachexia     BMI Findings:      Body mass index is 14.8 kg/m².

## 2025-06-17 NOTE — ASSESSMENT & PLAN NOTE
Echo in March 2025 with LVEF 25%  On PTA torsemide 40 mg daily and losartan 12.5 mg daily  Due to patient's low blood pressure at baseline and noted systolic blood pressures in the high 90s, holding PTA torsemide as well as losartan.   Appears to be hypovolemic at this time, holding torsemide for now

## 2025-06-17 NOTE — ASSESSMENT & PLAN NOTE
Appetite is good, tolerated breakfast during time of encounter.    Malnutrition Findings:                                 BMI Findings:           Body mass index is 14.8 kg/m².

## 2025-06-17 NOTE — PLAN OF CARE
Problem: Potential for Falls  Goal: Patient will remain free of falls  Description: INTERVENTIONS:  - Educate patient/family on patient safety including physical limitations  - Instruct patient to call for assistance with activity   - Consider consulting OT/PT to assist with strengthening/mobility based on AM PAC & -HLM score  - Consult OT/PT to assist with strengthening/mobility   - Keep Call bell within reach  - Keep bed low and locked with side rails adjusted as appropriate  - Keep care items and personal belongings within reach  - Initiate and maintain comfort rounds  - Make Fall Risk Sign visible to staff  - Offer Toileting every  Hours, in advance of need  - Initiate/Maintain alarm  - Obtain necessary fall risk management equipment:   - Apply yellow socks and bracelet for high fall risk patients  - Consider moving patient to room near nurses station  Outcome: Progressing     Problem: Prexisting or High Potential for Compromised Skin Integrity  Goal: Skin integrity is maintained or improved  Description: INTERVENTIONS:  - Identify patients at risk for skin breakdown  - Assess and monitor skin integrity including under and around medical devices   - Assess and monitor nutrition and hydration status  - Monitor labs  - Assess for incontinence   - Turn and reposition patient  - Assist with mobility/ambulation  - Relieve pressure over chuyita prominences   - Avoid friction and shearing  - Provide appropriate hygiene as needed including keeping skin clean and dry  - Evaluate need for skin moisturizer/barrier cream  - Collaborate with interdisciplinary team  - Patient/family teaching  - Consider wound care consult    Assess:  - Review Jose scale daily  - Clean and moisturize skin every   - Inspect skin when repositioning, toileting, and assisting with ADLS  - Assess under medical devices such as  every   - Assess extremities for adequate circulation and sensation     Bed Management:  - Have minimal linens on bed &  keep smooth, unwrinkled  - Change linens as needed when moist or perspiring  - Avoid sitting or lying in one position for more than  hours while in bed?Keep HOB at degrees   - Toileting:  - Offer bedside commode  - Assess for incontinence every   - Use incontinent care products after each incontinent episode such as     Activity:  - Mobilize patient  times a day  - Encourage activity and walks on unit  - Encourage or provide ROM exercises   - Turn and reposition patient every  Hours  - Use appropriate equipment to lift or move patient in bed  - Instruct/ Assist with weight shifting every  when out of bed in chair  - Consider limitation of chair time  hour intervals    Skin Care:  - Avoid use of baby powder, tape, friction and shearing, hot water or constrictive clothing  - Relieve pressure over bony prominences using   - Do not massage red bony areas    Next Steps:  - Teach patient strategies to minimize risks such as   - Consider consults to  interdisciplinary teams such as   Outcome: Progressing

## 2025-06-17 NOTE — PROGRESS NOTES
Progress Note - Hospitalist   Name: Natalio Hartmann Jr. 70 y.o. male I MRN: [id number]  Unit/Bed#: [facility] 911-01 I Date of Admission: 6/15/2025   Date of Service: 6/17/2025 I Hospital Day: 2    Assessment & Plan  Acute on chronic respiratory failure with hypoxia and hypercapnia (HCC)  Presented to the ER with respiratory distress and placed on BiPAP  Chest x-ray reviewed with evidence of underlying severe emphysema possible volume overload more noted of the right side on my read awaiting official read   Patient with chronic hypoxic and hypercapnic respiratory failure related primarily to end-stage COPD but also has nonischemic cardiomyopathy with EF 25%; Requires supplemental O2 24/7 as well as BiPAP at night as well as additionally has been needing to use it throughout the day at times  VBG 7.24/137/30/58 initially before BIPAP in the ER.   VBG improved this morning but pco2 remains high resume bipap  Continue bronchodilators and IV steroids, start steroid taper when he improves  Pulmonology following  Nonischemic cardiomyopathy (HCC)  Echo in March 2025 with LVEF 25%  On PTA torsemide 40 mg daily and losartan 12.5 mg daily  Due to patient's low blood pressure at baseline and noted systolic blood pressures in the high 90s, holding PTA torsemide as well as losartan.   Appears to be hypovolemic at this time, holding torsemide for now    Goals of care, counseling/discussion  Extensive discussion with patient's wife as well as patient in ER Room regarding goals of care in setting of end stage COPD who presents with hypercapnic respiratory failure. Medical recommendation of hospice was made given patient's critically ill status. Patient and wife at bedside aware of recommendation. Patient and wife would like to continue bipap overnight despite minimal improvements in gas and my concerns with patient's overall comfort. We did discuss the concerns with further escalation of care to a ventilator/cpr given patient's frail  state/end stage copd and patient as well as wife understanding of this. As such both report that patients code status should be DNR/DNI with that limit set.   Consult palliative care team for further assistance with continued goals of care discussions as patient with end-stage COPD as well as severe cachexia and highly concerned patient is in the end stages of life  Pulmonary cachexia due to COPD (HCC)  Malnutrition Findings: BMI 17, severe cachexia     BMI Findings:      Body mass index is 14.8 kg/m².     End stage COPD (HCC)  Follows with Dr. Luque with pulmonology outpatient and reviewed her note from just 2 days ago which makes this diagnosis quite clear  On supplemental O2 24/7 as well as trilogy noninvasive ventilator at night as well as additionally it appears needing to use this trilogy sometimes during the day  Thought to be main driving force behind his admission for respiratory failure today with possible additional component of CHF  Treating with scheduled nebulizers as well as IV Solu-Medrol and azithromycin  BiPAP PRN and HS  Pulmonary consultation  Hypotension  BP at baseline appears in the 100-110 systolic   Severe Cachexia   Given 750 cc overnight as opposed to more aggressive IVF given concurrent respiratory failure   Palliative care by specialist  As above    VTE Pharmacologic Prophylaxis:   Moderate Risk (Score 3-4) - Pharmacological DVT Prophylaxis Ordered: enoxaparin (Lovenox).    Mobility:   Basic Mobility Inpatient Raw Score: 11  JH-HLM Goal: 4: Move to chair/commode  JH-HLM Achieved: 2: Bed activities/Dependent transfer  JH-HLM Goal NOT achieved. Continue with multidisciplinary rounding and encourage appropriate mobility to improve upon JH-HLM goals.    Patient Centered Rounds: I performed bedside rounds with nursing staff today.   Discussions with Specialists or Other Care Team Provider: nurse, CM, pulmonology    Education and Discussions with Family / Patient: Updated  (wife)  at bedside.    Current Length of Stay: 2 day(s)  Current Patient Status: Inpatient   Certification Statement: The patient will continue to require additional inpatient hospital stay due to respiratory failure  Discharge Plan: Anticipate discharge in >72 hrs to home with home services.    Code Status: Level 3 - DNAR and DNI    Subjective   Denies any new complaints but continues to have shortness of breath    Objective :  Temp:  [97.4 °F (36.3 °C)-98.9 °F (37.2 °C)] 98.7 °F (37.1 °C)  HR:  [63-97] 84  BP: (106-114)/(61-69) 112/64  Resp:  [16-36] 16  SpO2:  [98 %-100 %] 99 %  O2 Device: Nasal cannula  Nasal Cannula O2 Flow Rate (L/min):  [5 L/min] 5 L/min    Body mass index is 14.8 kg/m².     Input and Output Summary (last 24 hours):     Intake/Output Summary (Last 24 hours) at 6/17/2025 1400  Last data filed at 6/16/2025 1848  Gross per 24 hour   Intake 250 ml   Output --   Net 250 ml       Physical Exam  Constitutional:       Appearance: He is ill-appearing.   HENT:      Head: Normocephalic and atraumatic.      Nose: Nose normal.     Eyes:      Extraocular Movements: Extraocular movements intact.       Cardiovascular:      Rate and Rhythm: Normal rate and regular rhythm.   Pulmonary:      Breath sounds: Wheezing present.   Abdominal:      General: There is no distension.      Palpations: Abdomen is soft.      Tenderness: There is no abdominal tenderness.     Musculoskeletal:      Right lower leg: No edema.      Left lower leg: No edema.     Skin:     General: Skin is warm and dry.     Neurological:      Mental Status: He is alert and oriented to person, place, and time.     Psychiatric:         Mood and Affect: Mood normal.         Behavior: Behavior normal.           Lines/Drains:  Lines/Drains/Airways       Active Status       Name Placement date Placement time Site Days    External Urinary Catheter 06/16/25  --  -- 1                      Telemetry:  Telemetry Orders (From admission, onward)               24 Hour  Telemetry Monitoring  Continuous x 24 Hours (Telem)        Expiring   Question:  Reason for 24 Hour Telemetry  Answer:  Acute respiratory failure on BiPAP                     Telemetry Reviewed: Normal Sinus Rhythm  Indication for Continued Telemetry Use: Acute respiratory failure on Bipap               Lab Results: I have reviewed the following results:   Results from last 7 days   Lab Units 06/16/25  0612   WBC Thousand/uL 5.39   HEMOGLOBIN g/dL 10.3*   HEMATOCRIT % 35.1*   PLATELETS Thousands/uL 200   LYMPHO PCT % 3*   MONO PCT % 0*   EOS PCT % 0     Results from last 7 days   Lab Units 06/16/25  0612   SODIUM mmol/L 139   POTASSIUM mmol/L 4.2   CHLORIDE mmol/L 86*   CO2 mmol/L >45*   BUN mg/dL 25   CREATININE mg/dL 0.76   CALCIUM mg/dL 8.4   ALBUMIN g/dL 3.6   TOTAL BILIRUBIN mg/dL 0.59   ALK PHOS U/L 57   ALT U/L 11   AST U/L 20   GLUCOSE RANDOM mg/dL 147*     Results from last 7 days   Lab Units 06/16/25  0612   INR  0.79*     Results from last 7 days   Lab Units 06/17/25  1147 06/17/25  0755 06/16/25  2028 06/16/25  1652 06/16/25  1124 06/16/25  0754   POC GLUCOSE mg/dl 229* 101 135 112 127 145*               Recent Cultures (last 7 days):         Imaging Results Review: No pertinent imaging studies reviewed.  Other Study Results Review: No additional pertinent studies reviewed.    Last 24 Hours Medication List:     Current Facility-Administered Medications:     aspirin chewable tablet 81 mg, Daily    azithromycin (ZITHROMAX) tablet 500 mg, Q24H    budesonide (PULMICORT) inhalation solution 0.5 mg, Q12H    ipratropium (ATROVENT) 0.02 % inhalation solution 0.5 mg, TID    levalbuterol (XOPENEX) inhalation solution 1.25 mg, TID    methylPREDNISolone sodium succinate (Solu-MEDROL) injection 40 mg, Q12H GABE    [START ON 6/18/2025] predniSONE tablet 40 mg, Daily **FOLLOWED BY** [START ON 6/21/2025] predniSONE tablet 30 mg, Daily **FOLLOWED BY** [START ON 6/24/2025] predniSONE tablet 20 mg, Daily **FOLLOWED BY**  [START ON 6/27/2025] predniSONE tablet 10 mg, Daily    senna (SENOKOT) tablet 17.2 mg, Once    simethicone (MYLICON) chewable tablet 80 mg, 4x Daily (with meals and at bedtime)    Administrative Statements   Today, Patient Was Seen By: Bubba Velasquez MD  I have spent a total time of 50 minutes in caring for this patient on the day of the visit/encounter including Diagnostic results, Instructions for management, Patient and family education, Impressions, Counseling / Coordination of care, Documenting in the medical record, Reviewing/placing orders in the medical record (including tests, medications, and/or procedures), Obtaining or reviewing history  , and Communicating with other healthcare professionals .    **Please Note: This note may have been constructed using a voice recognition system.**

## 2025-06-17 NOTE — PROGRESS NOTES
Progress Note - Palliative Care   Name: Natalio Hartmann Jr. 70 y.o. male I MRN: [id number]  Unit/Bed#: [facility] 911-01 I Date of Admission: 6/15/2025   Date of Service: 6/17/2025 I Hospital Day: 2     Assessment & Plan  Goals of care, counseling/discussion  Code Status: DNR - Level 3  Continue current level of care without escalation at this time.  Patient may continue to use BiPAP as indicated.  Hospice consult placed, pending.  Patient is as wife would like to learn more about services.  Specifically how hospice would manage BiPAP and medications.  Discussed basics.  Patient has historically desired ongoing disease directed therapies and readmissions. However, for the first time yesterday he expressed desire NOT to return to the hospital for future exacerbations.   Decisional apparatus:  Patient is marginally competent on my exam today.  If competence is lost, patient's substitute decision maker would default to spouse by [place] Act 169.  Advance Directive / Living Will / POLST:  none completed               Palliative care by specialist  Palliative Diagnosis: end stage COPD, CHF    Goals:   Level 3 DNR  See goals of care  Palliative will follow for ongoing goals of care discussions as situation evolves.    Social Support:  Patient's support system: spouse, Sarai, and son, Serjio. Patient also has a daughter who lives in [place]  Supportive listening provided  Normalized experience of patient  Palliative  to follow-up.  Spouse inquires about completion of advance directive.    Care Coordination  Case discussed with primary team, CM, RN, hospice, family at bedside    Follow-up  We appreciate the opportunity to participate in this patient's care.   We will continue to follow while admitted.    Please do not hesitate to contact our on-call provider through EPIC Secure Chat or contact [phone] should there be an acute change or other symptom control concerns.    Please do not make any changes to symptom  medications (opioid analgesics, nonopioid analgesics, antiemetics, etc) without first consulting the on-call Palliative Care provider for your specific campus; including after hours and on weekends. We are available 24/7, and can be contacted on Epic secure chat or at [phone].    Pulmonary cachexia due to COPD (HCC)  Appetite is good, tolerated breakfast during time of encounter.    Malnutrition Findings:                                 BMI Findings:           Body mass index is 14.8 kg/m².     End stage COPD (HCC)  Appreciate pulmonology input  Patient will continue to use BiPAP at bedtime and as needed during the day.  Per pulmonary will use as needed wakefulness in setting of CO2 retention.  Continued nebulized therapy and treatment of COPD exacerbation  Discussed nature of readmissions increasing in frequency and severity  Acute on chronic respiratory failure with hypoxia and hypercapnia (HCC)    Nonischemic cardiomyopathy (HCC)    Interval history:       Patient was on BiPAP overnight.  Tolerated well.  Baseline mental status this morning.  Patient denies any complaints with exception to dry skin.  He is eating breakfast during time of encounter and his wife is at bedside.  Patient is eager to return home as soon as possible.  Revisited consideration of goals of care and whether he wishes to return with hospice or return to hospital as indicated in the future.  Hospice consult pending.  If they do not decide to proceed with hospice would recommend consideration of palliative care at home.    MEDICATIONS / ALLERGIES:     all current active meds have been reviewed    Allergies[1]    OBJECTIVE:    Physical Exam  Physical Exam  HENT:      Head: Normocephalic and atraumatic.     Eyes:      Conjunctiva/sclera: Conjunctivae normal.       Cardiovascular:      Rate and Rhythm: Normal rate.   Pulmonary:      Effort: No respiratory distress.      Comments: On O2 via NC  Abdominal:      Tenderness: There is no  guarding.     Musculoskeletal:         General: No swelling.      Comments: Clubbing in fingers     Skin:     General: Skin is dry.     Neurological:      Mental Status: He is alert. Mental status is at baseline.      Comments: Oriented to self, place, and situation but disoriented to time (this is baseline for him)   Psychiatric:         Mood and Affect: Mood normal.         Thought Content: Thought content normal.         Lab Results:   Results from last 7 days   Lab Units 06/16/25  0612 06/15/25  1856   WBC Thousand/uL 5.39 10.87*   HEMOGLOBIN g/dL 10.3* 11.6*   HEMATOCRIT % 35.1* 39.1   PLATELETS Thousands/uL 200 246   MONO PCT % 0* 4   EOS PCT % 0 0     Results from last 7 days   Lab Units 06/16/25  0612 06/15/25  1856   POTASSIUM mmol/L 4.2 3.9   CHLORIDE mmol/L 86* 80*   CO2 mmol/L >45* >45*   BUN mg/dL 25 25   CREATININE mg/dL 0.76 0.87   CALCIUM mg/dL 8.4 9.1   ALK PHOS U/L 57 70   ALT U/L 11 14   AST U/L 20 24       Imaging Studies: reviewed pertinent studies   EKG, Pathology, and Other Studies: reviewed pertinent studies    Counseling / Coordination of Care    Total floor / unit time spent today 30+minutes. Greater than 50% of total time was spent with the patient and / or family counseling and / or coordination of care. A description of the counseling / coordination of care: medication review, psychosocial support, chart review, lab review, advanced directives, goals of care, hospice services, supportive listening, anticipatory guidance, and coordination with RN and hospice liaison                [1] No Known Allergies

## 2025-06-18 PROBLEM — I95.9 HYPOTENSION: Status: RESOLVED | Noted: 2025-06-16 | Resolved: 2025-06-18

## 2025-06-18 PROBLEM — G93.41 METABOLIC ENCEPHALOPATHY: Status: ACTIVE | Noted: 2025-06-18

## 2025-06-18 PROBLEM — R41.82 AMS (ALTERED MENTAL STATUS): Status: ACTIVE | Noted: 2025-06-18

## 2025-06-18 LAB
BASE EX.OXY STD BLDV CALC-SCNC: 96.9 % (ref 60–80)
BASE EXCESS BLDV CALC-SCNC: 19.5 MMOL/L
BUN SERPL-MCNC: 28 MG/DL (ref 5–25)
CALCIUM SERPL-MCNC: 8.8 MG/DL (ref 8.4–10.2)
CHLORIDE SERPL-SCNC: 86 MMOL/L (ref 96–108)
CO2 SERPL-SCNC: >45 MMOL/L (ref 21–32)
CREAT SERPL-MCNC: 0.71 MG/DL (ref 0.6–1.3)
ERYTHROCYTE [DISTWIDTH] IN BLOOD BY AUTOMATED COUNT: 12.2 % (ref 11.6–15.1)
EST. AVERAGE GLUCOSE BLD GHB EST-MCNC: 117 MG/DL
GFR SERPL CREATININE-BSD FRML MDRD: 96 ML/MIN/1.73SQ M
GLUCOSE SERPL-MCNC: 103 MG/DL (ref 65–140)
GLUCOSE SERPL-MCNC: 115 MG/DL (ref 65–140)
GLUCOSE SERPL-MCNC: 121 MG/DL (ref 65–140)
GLUCOSE SERPL-MCNC: 139 MG/DL (ref 65–140)
GLUCOSE SERPL-MCNC: 327 MG/DL (ref 65–140)
HBA1C MFR BLD: 5.7 %
HCO3 BLDV-SCNC: 47.6 MMOL/L (ref 24–30)
HCT VFR BLD AUTO: 32 % (ref 36.5–49.3)
HGB BLD-MCNC: 9.5 G/DL (ref 12–17)
MAGNESIUM SERPL-MCNC: 2.1 MG/DL (ref 1.9–2.7)
MCH RBC QN AUTO: 30.2 PG (ref 26.8–34.3)
MCHC RBC AUTO-ENTMCNC: 29.7 G/DL (ref 31.4–37.4)
MCV RBC AUTO: 102 FL (ref 82–98)
O2 CT BLDV-SCNC: 14.9 ML/DL
PCO2 BLDV: 77.5 MM HG (ref 42–50)
PH BLDV: 7.41 [PH] (ref 7.3–7.4)
PHOSPHATE SERPL-MCNC: 3.3 MG/DL (ref 2.3–4.1)
PLATELET # BLD AUTO: 205 THOUSANDS/UL (ref 149–390)
PMV BLD AUTO: 9.6 FL (ref 8.9–12.7)
PO2 BLDV: 159.8 MM HG (ref 35–45)
POTASSIUM SERPL-SCNC: 4.1 MMOL/L (ref 3.5–5.3)
RBC # BLD AUTO: 3.15 MILLION/UL (ref 3.88–5.62)
SODIUM SERPL-SCNC: 137 MMOL/L (ref 135–147)
WBC # BLD AUTO: 6.1 THOUSAND/UL (ref 4.31–10.16)

## 2025-06-18 PROCEDURE — 80048 BASIC METABOLIC PNL TOTAL CA: CPT | Performed by: INTERNAL MEDICINE

## 2025-06-18 PROCEDURE — 85027 COMPLETE CBC AUTOMATED: CPT | Performed by: INTERNAL MEDICINE

## 2025-06-18 PROCEDURE — 99233 SBSQ HOSP IP/OBS HIGH 50: CPT | Performed by: INTERNAL MEDICINE

## 2025-06-18 PROCEDURE — 84100 ASSAY OF PHOSPHORUS: CPT | Performed by: INTERNAL MEDICINE

## 2025-06-18 PROCEDURE — 94640 AIRWAY INHALATION TREATMENT: CPT

## 2025-06-18 PROCEDURE — 83036 HEMOGLOBIN GLYCOSYLATED A1C: CPT | Performed by: INTERNAL MEDICINE

## 2025-06-18 PROCEDURE — 83735 ASSAY OF MAGNESIUM: CPT | Performed by: INTERNAL MEDICINE

## 2025-06-18 PROCEDURE — 94760 N-INVAS EAR/PLS OXIMETRY 1: CPT

## 2025-06-18 PROCEDURE — 99233 SBSQ HOSP IP/OBS HIGH 50: CPT

## 2025-06-18 PROCEDURE — 82948 REAGENT STRIP/BLOOD GLUCOSE: CPT

## 2025-06-18 PROCEDURE — 99232 SBSQ HOSP IP/OBS MODERATE 35: CPT | Performed by: INTERNAL MEDICINE

## 2025-06-18 PROCEDURE — 82805 BLOOD GASES W/O2 SATURATION: CPT | Performed by: INTERNAL MEDICINE

## 2025-06-18 PROCEDURE — 94664 DEMO&/EVAL PT USE INHALER: CPT

## 2025-06-18 RX ORDER — INSULIN LISPRO 100 [IU]/ML
1-5 INJECTION, SOLUTION INTRAVENOUS; SUBCUTANEOUS
Status: DISCONTINUED | OUTPATIENT
Start: 2025-06-18 | End: 2025-06-20 | Stop reason: HOSPADM

## 2025-06-18 RX ADMIN — PREDNISONE 40 MG: 20 TABLET ORAL at 08:29

## 2025-06-18 RX ADMIN — ASPIRIN 81 MG CHEWABLE TABLET 81 MG: 81 TABLET CHEWABLE at 08:29

## 2025-06-18 RX ADMIN — INSULIN LISPRO 3 UNITS: 100 INJECTION, SOLUTION INTRAVENOUS; SUBCUTANEOUS at 12:22

## 2025-06-18 RX ADMIN — LEVALBUTEROL HYDROCHLORIDE 1.25 MG: 1.25 SOLUTION RESPIRATORY (INHALATION) at 07:15

## 2025-06-18 RX ADMIN — LEVALBUTEROL HYDROCHLORIDE 1.25 MG: 1.25 SOLUTION RESPIRATORY (INHALATION) at 20:12

## 2025-06-18 RX ADMIN — BUDESONIDE 0.5 MG: 0.5 INHALANT RESPIRATORY (INHALATION) at 07:15

## 2025-06-18 RX ADMIN — IPRATROPIUM BROMIDE 0.5 MG: 0.5 SOLUTION RESPIRATORY (INHALATION) at 13:13

## 2025-06-18 RX ADMIN — AZITHROMYCIN DIHYDRATE 500 MG: 250 TABLET ORAL at 22:00

## 2025-06-18 RX ADMIN — BUDESONIDE 0.5 MG: 0.5 INHALANT RESPIRATORY (INHALATION) at 20:12

## 2025-06-18 RX ADMIN — LEVALBUTEROL HYDROCHLORIDE 1.25 MG: 1.25 SOLUTION RESPIRATORY (INHALATION) at 13:13

## 2025-06-18 RX ADMIN — SIMETHICONE 80 MG: 80 TABLET, CHEWABLE ORAL at 08:29

## 2025-06-18 RX ADMIN — SIMETHICONE 80 MG: 80 TABLET, CHEWABLE ORAL at 22:01

## 2025-06-18 RX ADMIN — ENOXAPARIN SODIUM 40 MG: 40 INJECTION SUBCUTANEOUS at 08:29

## 2025-06-18 RX ADMIN — IPRATROPIUM BROMIDE 0.5 MG: 0.5 SOLUTION RESPIRATORY (INHALATION) at 07:15

## 2025-06-18 RX ADMIN — IPRATROPIUM BROMIDE 0.5 MG: 0.5 SOLUTION RESPIRATORY (INHALATION) at 20:12

## 2025-06-18 NOTE — OCCUPATIONAL THERAPY NOTE
Occupational Therapy Cancellation Note        Patient Name: Natalio Hartmann Jr.  Today's Date: 6/18/2025 06/18/25 1145   OT Last Visit   OT Visit Date 06/18/25   Note Type   Note type Cancelled Session;Evaluation   Cancel Reasons Medical status   Additional Comments OT re-consulted at request of MD - unclear status of hospice/comfort care decision. Attempted to see pt this morning. However, per RN, pt about to be placed on BiPAP. Will hold OT evaluation at this time. Will attempt it as appropriate/able.     NOLAN Walton, OTR/L

## 2025-06-18 NOTE — ASSESSMENT & PLAN NOTE
Malnutrition Findings:     BMI Findings:  Adult BMI Classifications: Underweight < 18.5        Body mass index is 14.88 kg/m².     In the setting of very severe COPD.  Nutrition consult  Caution with feeding the patient given alterations in mentation and needing to wear BiPAP

## 2025-06-18 NOTE — PROGRESS NOTES
Progress Note - Pulmonology   Name: Natalio Hartmann Jr. 70 y.o. male I MRN: [id number]  [facility] I Date of Admission: 6/15/2025   Date of Service: 6/18/2025 I Hospital Day: 3     Assessment & Plan  Acute on chronic respiratory failure with hypoxia and hypercapnia (HCC)  Presented to the ER with respiratory distress and placed on BiPAP. Patient with chronic hypoxic and hypercapnic respiratory failure related primarily to end-stage COPD but also has nonischemic cardiomyopathy with EF 25%; Requires supplemental O2 24/7 as well as BiPAP at night as well as additionally has been needing to use it throughout the day at times.  Extensive conversation was had with the patient and his wife, they are aware that he is at end-stage disease  Patient himself is adamant that he wants to go home  Palliative care following, will meet with the hospice team -meeting today at 3 PM  Emotional support given to the wife  Continue BiPAP with naps and at night, CO2 plateauing around 80  Continue with around-the-clock nebulizers for now -start prednisone taper  Nonischemic cardiomyopathy (HCC)  Echo in March 2025 with LVEF 25%.  On PTA torsemide 40 mg daily and losartan 12.5 mg daily  On hold given borderline blood pressures  Per primary  Goals of care, counseling/discussion  Met with the patient, his wife, palliative care, his primary nurse to discuss his goals of care.  The patient became inflamed and irritated at her questioning, stated he would just like to go home.  His wife was visibly upset.  Continue with emotional support for his family  Patient is agreeable to meet with hospice  Palliative care following, appreciate recommendations  Pulmonary cachexia due to COPD (HCC)  Malnutrition Findings:     BMI Findings:  Adult BMI Classifications: Underweight < 18.5        Body mass index is 14.88 kg/m².     In the setting of very severe COPD.  Nutrition consult  Caution with feeding the patient given alterations in mentation and  needing to wear BiPAP  End stage COPD (HCC)  Last PFTs in 2020 revealing an FEV1 to FVC ratio of 30% with an FEV1 of 0.6 L, which is 22% predicted.  Patient is on all nebulized regimen at home.  Continue nebulizers for now  Continue steroids  Patient is aware he is at end-stage disease  Hypotension  In the setting of nonischemic cardiomyopathy and other comorbidities.  Management per primary  Patient is agreeable to no further escalation in care    24 Hour Events : Patient CO2 responded to BiPAP, plateauing around 80.  Subjective : Adamant that he wants to go home.    Objective :  Temp:  [97.5 °F (36.4 °C)-98.7 °F (37.1 °C)] 97.5 °F (36.4 °C)  HR:  [70-97] 70  BP: (107-112)/(62-66) 107/65  Resp:  [15-36] 16  SpO2:  [94 %-100 %] 100 %  O2 Device: BiPAP  Nasal Cannula O2 Flow Rate (L/min):  [2 L/min-5 L/min] 2 L/min    Physical Exam  Vitals reviewed.   Constitutional:       General: He is not in acute distress.     Appearance: He is ill-appearing.   HENT:      Head: Normocephalic and atraumatic.      Right Ear: External ear normal.      Left Ear: External ear normal.      Nose: Nose normal.      Mouth/Throat:      Mouth: Mucous membranes are dry.     Eyes:      General: No scleral icterus.      Cardiovascular:      Rate and Rhythm: Normal rate and regular rhythm.   Pulmonary:      Effort: Pulmonary effort is normal.      Comments: Decreased breath sounds globally  Abdominal:      General: Abdomen is flat. There is no distension.      Palpations: Abdomen is soft.     Musculoskeletal:      Right lower leg: Edema present.      Left lower leg: Edema present.     Skin:     General: Skin is warm and dry.      Capillary Refill: Capillary refill takes less than 2 seconds.      Findings: Bruising present.     Neurological:      Mental Status: He is alert. Mental status is at baseline.           Lab Results: I have reviewed the following results:   .     06/18/25  0614   WBC 6.10   HGB 9.5*   HCT 32.0*      SODIUM 137    K 4.1   CL 86*   CO2 >45*   BUN 28*   CREATININE 0.71   GLUC 139   MG 2.1   PHOS 3.3     ABG: No new results in last 24 hours.    Imaging Results Review: I reviewed radiology reports from this admission including: chest xray.  Other Study Results Review: EKG was reviewed.   PFT Results Reviewed: reviewed.    Kamrno Ye D.O.  PGY-5, Pulmonary/Critical Care  [facility]

## 2025-06-18 NOTE — MALNUTRITION/BMI
This medical record reflects one or more clinical indicators suggestive of malnutrition.    Malnutrition Findings:   Adult Malnutrition type: Chronic illness  Adult Degree of Malnutrition: Other severe protein calorie malnutrition  Malnutrition Characteristics: Fat loss, Muscle loss    360 Statement: Severe protein calorie malnutrition of chronic illness related to increased energy needs as evidenced by hollow orbits, depression at the temples and protruding clavicles.  Treated with Regular diet and supplements    BMI Findings:  Adult BMI Classifications: Underweight < 18.5        Body mass index is 14.8 kg/m².     See Nutrition note dated 6/17/25 for additional details.  Completed nutrition assessment is viewable in the nutrition documentation.

## 2025-06-18 NOTE — ASSESSMENT & PLAN NOTE
Presented to the ER with respiratory distress and placed on BiPAP. Patient with chronic hypoxic and hypercapnic respiratory failure related primarily to end-stage COPD but also has nonischemic cardiomyopathy with EF 25%; Requires supplemental O2 24/7 as well as BiPAP at night as well as additionally has been needing to use it throughout the day at times.  Extensive conversation was had with the patient and his wife, they are aware that he is at end-stage disease  Patient himself is adamant that he wants to go home  Palliative care following, will meet with the hospice team -meeting today at 3 PM  Emotional support given to the wife  Continue BiPAP with naps and at night, CO2 plateauing around 80  Continue with around-the-clock nebulizers for now -start prednisone taper

## 2025-06-18 NOTE — ASSESSMENT & PLAN NOTE
Likely due to contaction alkalosis from OP torsemide use  D/w with pulmonology earlier today, they do not recommend diamox  Continue bipap at HS and PRN  Will supplement chloride with PO KCl

## 2025-06-18 NOTE — ASSESSMENT & PLAN NOTE
Suspect this is due to hypercarbic respiratory failure as mentioned above, corticosteroid use may also be contributing  Continue bipap  Will also check a B12 and ammonia level with tomorrow's labs

## 2025-06-18 NOTE — PLAN OF CARE
Problem: Potential for Falls  Goal: Patient will remain free of falls  Description: INTERVENTIONS:  - Educate patient/family on patient safety including physical limitations  - Instruct patient to call for assistance with activity   - Consider consulting OT/PT to assist with strengthening/mobility based on AM PAC & JH-HLM score  - Consult OT/PT to assist with strengthening/mobility   - Keep Call bell within reach  - Keep bed low and locked with side rails adjusted as appropriate  - Keep care items and personal belongings within reach  - Initiate and maintain comfort rounds  - Make Fall Risk Sign visible to staff  - Offer Toileting every 2 Hours, in advance of need  - Initiate/Maintain bed alarm  - Obtain necessary fall risk management equipment:   - Apply yellow socks and bracelet for high fall risk patients  - Consider moving patient to room near nurses station  Outcome: Progressing     Problem: Prexisting or High Potential for Compromised Skin Integrity  Goal: Skin integrity is maintained or improved  Description: INTERVENTIONS:  - Identify patients at risk for skin breakdown  - Assess and monitor skin integrity including under and around medical devices   - Assess and monitor nutrition and hydration status  - Monitor labs  - Assess for incontinence   - Turn and reposition patient  - Assist with mobility/ambulation  - Relieve pressure over chuyita prominences   - Avoid friction and shearing  - Provide appropriate hygiene as needed including keeping skin clean and dry  - Evaluate need for skin moisturizer/barrier cream  - Collaborate with interdisciplinary team  - Patient/family teaching  - Consider wound care consult    Assess:  - Review Jose scale daily  - Clean and moisturize skin every 8 hours  - Inspect skin when repositioning, toileting, and assisting with ADLS  - Assess under medical devices such as mossimo every 2  - Assess extremities for adequate circulation and sensation     Bed Management:  - Have  minimal linens on bed & keep smooth, unwrinkled  - Change linens as needed when moist or perspiring  - Avoid sitting or lying in one position for more than 4 hours while in bed?Keep HOB at 30degrees   - Toileting:  - Offer bedside commode  - Assess for incontinence every 2  - Use incontinent care products after each incontinent episode such as     Activity:  - Mobilize patient 3 times a day  - Encourage activity and walks on unit  - Encourage or provide ROM exercises   - Turn and reposition patient every 2 Hours  - Use appropriate equipment to lift or move patient in bed  - Instruct/ Assist with weight shifting every 2 when out of bed in chair  - Consider limitation of chair time 4 hour intervals    Skin Care:  - Avoid use of baby powder, tape, friction and shearing, hot water or constrictive clothing  - Relieve pressure over bony prominences using   - Do not massage red bony areas    Next Steps:  - Teach patient strategies to minimize risks such as   - Consider consults to  interdisciplinary teams such as   Outcome: Progressing     Problem: Nutrition/Hydration-ADULT  Goal: Nutrient/Hydration intake appropriate for improving, restoring or maintaining nutritional needs  Description: Monitor and assess patient's nutrition/hydration status for malnutrition. Collaborate with interdisciplinary team and initiate plan and interventions as ordered.  Monitor patient's weight and dietary intake as ordered or per policy. Utilize nutrition screening tool and intervene as necessary. Determine patient's food preferences and provide high-protein, high-caloric foods as appropriate.     INTERVENTIONS:  - Monitor oral intake, urinary output, labs, and treatment plans  - Assess nutrition and hydration status and recommend course of action  - Evaluate amount of meals eaten  - Assist patient with eating if necessary   - Allow adequate time for meals  - Recommend/ encourage appropriate diets, oral nutritional supplements, and  vitamin/mineral supplements  - Order, calculate, and assess calorie counts as needed  - Recommend, monitor, and adjust tube feedings and TPN/PPN based on assessed needs  - Assess need for intravenous fluids  - Provide specific nutrition/hydration education as appropriate  - Include patient/family/caregiver in decisions related to nutrition  Outcome: Progressing

## 2025-06-18 NOTE — PHYSICAL THERAPY NOTE
Physical Therapy Cancellation Note         06/18/25 1142   PT Last Visit   PT Visit Date 06/18/25   Note Type   Note type Cancelled Session   Cancel Reasons Medical status   Additional Comments PT reconsulted at request of MD - unclear status of hospice decision.   Attempted to see pt, however, per RN, respiratory is on th eway to place pt on BiPAP.  PT to continue to follow and see as appropriate and able.       Kaylee Kennedy, PT, DPT

## 2025-06-18 NOTE — ASSESSMENT & PLAN NOTE
Follows with Dr. Luque with pulmonology outpatient and reviewed her note from just 2 days ago which makes this diagnosis quite clear  On supplemental O2 24/7 as well as trilogy noninvasive ventilator at night as well as additionally it appears needing to use this trilogy sometimes during the day  Thought to be main driving force behind his admission for respiratory failure today with possible additional component of CHF  Treating with scheduled nebulizers as well as IV Solu-Medrol and azithromycin  BiPAP PRN and HS  Seen by hospice service today, not yet ready to start home hospice and wants readmission

## 2025-06-18 NOTE — PROGRESS NOTES
Progress Note - Palliative Care   Name: Natalio Hartmann Jr. 70 y.o. male I MRN: [id number]  [facility] Date of Admission: 6/15/2025   Date of Service: 6/18/2025 I Hospital Day: 3     Assessment & Plan  Goals of care, counseling/discussion  Code Status: DNR/DNI - Level 3  Continue current level of care without escalation at this time.  Patient may continue to use BiPAP as indicated.  Patient and spouse seen today with hospice liaison to further discuss consideration of hospice services upon discharge. Patient is lethargic, per spouse more confused than this morning. Unfortunately BiPAP would not be covered on hospice, desire to continue BiPAP and they cannot afford oop cost.  Furthermore, patient states today on several separate occasions to spouse/SLIM provider/myself that he wants to go home and if he decompensated at home that he would want to return to the hospital. They are not ready for hospice at this time.  Offered outpatient f/u and reconsideration for home-based palliative care services. Wife expressed that patient firmly declined outpatient palliative services after last admission. She has our contact and if he reconsiders will reach out to schedule a visit.  Sarai is struggling with Natalio's continued decline and more frequent admissions. Expressed feelings of guilt about calling EMS on his behalf, though feels she has no other options when he declines at home. Consideration for POLST on discharge as accepted by patient.  Sarai had questions about how they will know when the time is right for hospice. Discussed that at this point he qualifies for hospice/is appropriate for hospice care, though ultimately the time is right when they are ready. She reports Natalio has expressed to her concerns about use of morphine and other opioid medications for comfort. Provided education with Sarai today regarding appropriate use of opioids to relieve air hunger/dyspnea at EOL, proportional dosing and  titration for symptom relief, goal of comfort medications is never to hasten death. She expressed understanding, feels that if/when Natalio is accepting of hospice he will struggle to accept medications for symptom relief.    Decisional apparatus:  Patient is marginally competent on my exam today.  If competence is lost, patient's substitute decision maker would default to spouse by [place] Act 169.  Advance Directive / Living Will / POLST:  none completed  Palliative care by specialist  Palliative Diagnosis: end stage COPD, CHF    Goals:   Level 3 DNR/DNI  See goals of care  Palliative will follow for ongoing goals of care discussions as situation evolves.    Social Support:  Patient's support system: spouse, Sarai, and son, Serjio. Patient also has a daughter who lives in [place]  Supportive listening provided  Normalized experience of patient  Palliative  to follow-up.  Spouse inquires about completion of advance directive.    Care Coordination  Case discussed with primary team, CM, RN, hospice, family at bedside    Follow-up  We appreciate the opportunity to participate in this patient's care.   We will continue to follow while admitted.    Please do not hesitate to contact our on-call provider through [employer] Secure Chat or contact [phone] should there be an acute change or other symptom control concerns.  Pulmonary cachexia due to COPD (HCC)    Malnutrition Findings:   Adult Malnutrition type: Chronic illness  Adult Degree of Malnutrition: Other severe protein calorie malnutrition  Malnutrition Characteristics: Fat loss, Muscle loss        360 Statement: Severe protein calorie malnutrition of chronic illness related to increased energy needs as evidenced by hollow orbits, depression at the temples and protruding clavicles.  Treated with Regular diet and supplements    BMI Findings:  Adult BMI Classifications: Underweight < 18.5     Body mass index is 14.88 kg/m².   Placed back on BiPAP per pt request  "prior to lunch, did not eat  End stage COPD (HCC)  Appreciate pulmonology input  Patient will continue to use BiPAP at bedtime and as needed during the day.  Per pulmonary will use as needed wakefulness in setting of CO2 retention.  Continued nebulized therapy and treatment of COPD exacerbation  Discussed nature of readmissions increasing in frequency and severity    VBG improved this morning, CO2 plateauing  Was off BiPAP this morning, though patient requested to be placed back on BiPAP prior to lunch  Discussed with nursing, no hypoxia or increased work of breathing prior to request to re-initiate BiPAP  On exam, patient denies dyspnea.  Acute on chronic respiratory failure with hypoxia and hypercapnia (HCC)    Nonischemic cardiomyopathy (HCC)      24 Hour History  Chart reviewed before visit.  No acute overnight events.  VBG with improvement this morning, was on NC though elected to be placed back on BiPAP prior to lunch.  On exam, he is confused, per spouse this is a change in mentation from this morning. SLIM / RN notified.  Goals of care discussion today with hospice liaison at bedside. Not ready for hospice at this time. Continued medical optimization with level 3 DNR/DNI limits in place.    Medications  Current Medications[1]    Objective  /65   Pulse 69   Temp 97.6 °F (36.4 °C)   Resp 20   Ht 5' 7\" (1.702 m)   Wt 43.1 kg (95 lb 0.3 oz)   SpO2 99%   BMI 14.88 kg/m²   Physical Exam  Vitals reviewed.   Constitutional:       General: He is not in acute distress.     Appearance: He is cachectic. He is ill-appearing.   HENT:      Head: Normocephalic and atraumatic.     Cardiovascular:      Rate and Rhythm: Normal rate.   Pulmonary:      Effort: No respiratory distress.      Comments: On BiPAP  Abdominal:      Palpations: Abdomen is soft.     Neurological:      Mental Status: He is lethargic and confused.       Lab Results: I have personally reviewed pertinent labs.  Imaging Studies: I have personally " "reviewed pertinent reports.  EKG, Pathology, and Other Studies: I have personally reviewed pertinent reports.    Counseling / Coordination of Care  Total floor / unit time spent today 45+ minutes. Greater than 50% of total time was spent with the patient and / or family counseling and / or coordinating of care. A description of the counseling / coordination of care: psychosocial support, chart review, lab review, goals of care, hospice services, supportive listening, anticipatory guidance, and coordination with RN, SLIM, CM, hospice liaison      DELIA Diego  Palliative & Supportive Care    Portions of this document may have been created using dictation software and as such some \"sound alike\" terms may have been generated by the system. Do not hesitate to contact me with any questions or clarifications.       [1]   Current Facility-Administered Medications:     aspirin chewable tablet 81 mg, 81 mg, Oral, Daily, Jorge Luis Ford DO, 81 mg at 06/18/25 0829    azithromycin (ZITHROMAX) tablet 500 mg, 500 mg, Oral, Q24H, Bubba Velasquez MD, 500 mg at 06/17/25 2143    budesonide (PULMICORT) inhalation solution 0.5 mg, 0.5 mg, Nebulization, Q12H, Jorge Luis Ford DO, 0.5 mg at 06/18/25 0715    enoxaparin (LOVENOX) subcutaneous injection 40 mg, 40 mg, Subcutaneous, Q24H [facility], Bubba Velasquez MD, 40 mg at 06/18/25 0829    insulin lispro (HumALOG/ADMELOG) 100 units/mL subcutaneous injection 1-5 Units, 1-5 Units, Subcutaneous, TID AC, 3 Units at 06/18/25 1222 **AND** Fingerstick Glucose (POCT), , , TID AC, Bubba Velasquez MD    insulin lispro (HumALOG/ADMELOG) 100 units/mL subcutaneous injection 1-5 Units, 1-5 Units, Subcutaneous, HS, Bubba Velasquez MD    ipratropium (ATROVENT) 0.02 % inhalation solution 0.5 mg, 0.5 mg, Nebulization, TID, Jorge Luis Ford DO, 0.5 mg at 06/18/25 1313    levalbuterol (XOPENEX) inhalation solution 1.25 mg, 1.25 mg, Nebulization, TID, Jorge Luis Ford DO, 1.25 mg at 06/18/25 1313    predniSONE tablet " 40 mg, 40 mg, Oral, Daily, 40 mg at 06/18/25 0829 **FOLLOWED BY** [START ON 6/21/2025] predniSONE tablet 30 mg, 30 mg, Oral, Daily **FOLLOWED BY** [START ON 6/24/2025] predniSONE tablet 20 mg, 20 mg, Oral, Daily **FOLLOWED BY** [START ON 6/27/2025] predniSONE tablet 10 mg, 10 mg, Oral, Daily, Kamron Ye DO    senna (SENOKOT) tablet 17.2 mg, 2 tablet, Oral, Once, Bubba Velasquez MD    simethicone (MYLICON) chewable tablet 80 mg, 80 mg, Oral, 4x Daily (with meals and at bedtime), Bubba Velasquez MD, 80 mg at 06/18/25 0829

## 2025-06-18 NOTE — ASSESSMENT & PLAN NOTE
Malnutrition Findings: BMI 17, severe cachexia  Adult Malnutrition type: Chronic illness  BMI Findings:  Adult BMI Classifications: Underweight < 18.5   Body mass index is 14.88 kg/m².

## 2025-06-18 NOTE — ASSESSMENT & PLAN NOTE
Presented to the ER with respiratory distress and placed on BiPAP  Chest x-ray reviewed with evidence of underlying severe emphysema possible volume overload more noted of the right side on my read awaiting official read   Patient with chronic hypoxic and hypercapnic respiratory failure related primarily to end-stage COPD but also has nonischemic cardiomyopathy with EF 25%; Requires supplemental O2 24/7 as well as BiPAP at night as well as additionally has been needing to use it throughout the day at times  VBG 7.24/137/30/58 initially before BIPAP in the ER.   VBG improved this morning but pco2 remains high resume bipap  Continue bronchodilators  Change IV to PO prednisone  Pulmonology following  Bipap at HS and Prn during the day

## 2025-06-18 NOTE — PROGRESS NOTES
Progress Note - Hospitalist   Name: Natalio aHrtmann Jr. 70 y.o. male I MRN: [id number]  Unit/Bed#: [facility] 911-01 I Date of Admission: 6/15/2025   Date of Service: 6/18/2025 I Hospital Day: 3    Assessment & Plan  Acute on chronic respiratory failure with hypoxia and hypercapnia (HCC)  Presented to the ER with respiratory distress and placed on BiPAP  Chest x-ray reviewed with evidence of underlying severe emphysema possible volume overload more noted of the right side on my read awaiting official read   Patient with chronic hypoxic and hypercapnic respiratory failure related primarily to end-stage COPD but also has nonischemic cardiomyopathy with EF 25%; Requires supplemental O2 24/7 as well as BiPAP at night as well as additionally has been needing to use it throughout the day at times  VBG 7.24/137/30/58 initially before BIPAP in the ER.   VBG improved this morning but pco2 remains high resume bipap  Continue bronchodilators  Change IV to PO prednisone  Pulmonology following  Bipap at HS and Prn during the day  Nonischemic cardiomyopathy (HCC)  Echo in March 2025 with LVEF 25%  On PTA torsemide 40 mg daily and losartan 12.5 mg daily  Due to patient's low blood pressure at baseline and noted systolic blood pressures in the high 90s, holding PTA torsemide as well as losartan.   Appears to be hypovolemic at this time, holding torsemide for now    Goals of care, counseling/discussion  Extensive discussion with patient's wife as well as patient in ER Room regarding goals of care in setting of end stage COPD who presents with hypercapnic respiratory failure. Medical recommendation of hospice was made given patient's critically ill status. Patient and wife at bedside aware of recommendation. Patient and wife would like to continue bipap overnight despite minimal improvements in gas and my concerns with patient's overall comfort. We did discuss the concerns with further escalation of care to a ventilator/cpr given patient's  frail state/end stage copd and patient as well as wife understanding of this. As such both report that patients code status should be DNR/DNI with that limit set.   Consult palliative care team for further assistance with continued goals of care discussions as patient with end-stage COPD as well as severe cachexia and highly concerned patient is in the end stages of life  Pulmonary cachexia due to COPD (HCC)  Malnutrition Findings: BMI 17, severe cachexia  Adult Malnutrition type: Chronic illness  BMI Findings:  Adult BMI Classifications: Underweight < 18.5   Body mass index is 14.88 kg/m².     End stage COPD (HCC)  Follows with Dr. Luque with pulmonology outpatient and reviewed her note from just 2 days ago which makes this diagnosis quite clear  On supplemental O2 24/7 as well as trilogy noninvasive ventilator at night as well as additionally it appears needing to use this trilogy sometimes during the day  Thought to be main driving force behind his admission for respiratory failure today with possible additional component of CHF  Treating with scheduled nebulizers as well as IV Solu-Medrol and azithromycin  BiPAP PRN and HS  Seen by hospice service today, not yet ready to start home hospice and wants readmission  Palliative care by specialist  As above  Hypochloremic alkalosis  Likely due to contaction alkalosis from OP torsemide use  D/w with pulmonology earlier today, they do not recommend diamox  Continue bipap at HS and PRN  Will supplement chloride with PO KCl  Metabolic encephalopathy  Suspect this is due to hypercarbic respiratory failure as mentioned above, corticosteroid use may also be contributing  Continue bipap  Will also check a B12 and ammonia level with tomorrow's labs    VTE Pharmacologic Prophylaxis: VTE Score: 3 Moderate Risk (Score 3-4) - Pharmacological DVT Prophylaxis Ordered: heparin.    Mobility:   Basic Mobility Inpatient Raw Score: 11  -[facility] Goal: 4: Move to chair/commode  [facility]  Achieved: 2: Bed activities/Dependent transfer  JH-HLM Goal NOT achieved. Continue with multidisciplinary rounding and encourage appropriate mobility to improve upon JH-HLM goals.    Patient Centered Rounds: I performed bedside rounds with nursing staff today.   Discussions with Specialists or Other Care Team Provider: nurse, CM, pulmonology, palliative care    Education and Discussions with Family / Patient: Updated  (wife) at bedside.    Current Length of Stay: 3 day(s)  Current Patient Status: Inpatient   Certification Statement: The patient will continue to require additional inpatient hospital stay due to respiratory failure  Discharge Plan: Anticipate discharge in 24-48 hrs to home with home services.    Code Status: Level 3 - DNAR and DNI    Subjective   Reports that his breathing is starting to improve. Wife at beside reports that he appears confused    Objective :  Temp:  [97.5 °F (36.4 °C)-98.1 °F (36.7 °C)] 97.6 °F (36.4 °C)  HR:  [69-91] 69  BP: (107-115)/(61-66) 113/65  Resp:  [16-20] 20  SpO2:  [95 %-100 %] 99 %  O2 Device: BiPAP  Nasal Cannula O2 Flow Rate (L/min):  [2 L/min-6 L/min] 6 L/min    Body mass index is 14.88 kg/m².     Input and Output Summary (last 24 hours):     Intake/Output Summary (Last 24 hours) at 6/18/2025 1631  Last data filed at 6/18/2025 1401  Gross per 24 hour   Intake 100 ml   Output 1250 ml   Net -1150 ml       Physical Exam  Constitutional:       Appearance: He is ill-appearing.   HENT:      Head: Normocephalic and atraumatic.      Nose: Nose normal.     Eyes:      Extraocular Movements: Extraocular movements intact.       Cardiovascular:      Rate and Rhythm: Normal rate and regular rhythm.   Pulmonary:      Breath sounds: No wheezing or rales.     Musculoskeletal:      Right lower leg: No edema.      Left lower leg: No edema.     Skin:     General: Skin is warm and dry.     Neurological:      Mental Status: He is alert. He is disoriented.     Psychiatric:          Mood and Affect: Mood normal.         Behavior: Behavior normal.           Lines/Drains:  Lines/Drains/Airways       Active Status       Name Placement date Placement time Site Days    External Urinary Catheter 06/16/25  --  -- 2                            Lab Results: I have reviewed the following results:   Results from last 7 days   Lab Units 06/18/25  0614 06/16/25  0612   WBC Thousand/uL 6.10 5.39   HEMOGLOBIN g/dL 9.5* 10.3*   HEMATOCRIT % 32.0* 35.1*   PLATELETS Thousands/uL 205 200   LYMPHO PCT %  --  3*   MONO PCT %  --  0*   EOS PCT %  --  0     Results from last 7 days   Lab Units 06/18/25  0614 06/16/25  0612   SODIUM mmol/L 137 139   POTASSIUM mmol/L 4.1 4.2   CHLORIDE mmol/L 86* 86*   CO2 mmol/L >45* >45*   BUN mg/dL 28* 25   CREATININE mg/dL 0.71 0.76   CALCIUM mg/dL 8.8 8.4   ALBUMIN g/dL  --  3.6   TOTAL BILIRUBIN mg/dL  --  0.59   ALK PHOS U/L  --  57   ALT U/L  --  11   AST U/L  --  20   GLUCOSE RANDOM mg/dL 139 147*     Results from last 7 days   Lab Units 06/16/25  0612   INR  0.79*     Results from last 7 days   Lab Units 06/18/25  1626 06/18/25  1135 06/18/25  0802 06/17/25  2159 06/17/25  1601 06/17/25  1549 06/17/25  1147 06/17/25  0755 06/16/25  2028 06/16/25  1652 06/16/25  1124 06/16/25  0754   POC GLUCOSE mg/dl 103 327* 121 119 186* 219* 229* 101 135 112 127 145*     Results from last 7 days   Lab Units 06/18/25  1232   HEMOGLOBIN A1C % 5.7*           Recent Cultures (last 7 days):         Imaging Results Review: No pertinent imaging studies reviewed.  Other Study Results Review: No additional pertinent studies reviewed.    Last 24 Hours Medication List:     Current Facility-Administered Medications:     aspirin chewable tablet 81 mg, Daily    azithromycin (ZITHROMAX) tablet 500 mg, Q24H    budesonide (PULMICORT) inhalation solution 0.5 mg, Q12H    enoxaparin (LOVENOX) subcutaneous injection 40 mg, Q24H Counts include 234 beds at the [facility]    insulin lispro (HumALOG/ADMELOG) 100 units/mL subcutaneous injection 1-5  Units, TID AC **AND** Fingerstick Glucose (POCT), TID AC    insulin lispro (HumALOG/ADMELOG) 100 units/mL subcutaneous injection 1-5 Units, HS    ipratropium (ATROVENT) 0.02 % inhalation solution 0.5 mg, TID    levalbuterol (XOPENEX) inhalation solution 1.25 mg, TID    predniSONE tablet 40 mg, Daily **FOLLOWED BY** [START ON 6/21/2025] predniSONE tablet 30 mg, Daily **FOLLOWED BY** [START ON 6/24/2025] predniSONE tablet 20 mg, Daily **FOLLOWED BY** [START ON 6/27/2025] predniSONE tablet 10 mg, Daily    senna (SENOKOT) tablet 17.2 mg, Once    simethicone (MYLICON) chewable tablet 80 mg, 4x Daily (with meals and at bedtime)    Administrative Statements   Today, Patient Was Seen By: Bubba Velasquez MD  I have spent a total time of 50 minutes in caring for this patient on the day of the visit/encounter including Diagnostic results, Prognosis, Risks and benefits of tx options, Instructions for management, Patient and family education, Importance of tx compliance, Risk factor reductions, Impressions, Counseling / Coordination of care, Documenting in the medical record, Reviewing/placing orders in the medical record (including tests, medications, and/or procedures), Obtaining or reviewing history  , and Communicating with other healthcare professionals .    **Please Note: This note may have been constructed using a voice recognition system.**

## 2025-06-19 ENCOUNTER — APPOINTMENT (INPATIENT)
Dept: RADIOLOGY | Facility: HOSPITAL | Age: 70
DRG: 189 | End: 2025-06-19
Attending: INTERNAL MEDICINE
Payer: COMMERCIAL

## 2025-06-19 VITALS
OXYGEN SATURATION: 98 % | HEART RATE: 85 BPM | SYSTOLIC BLOOD PRESSURE: 125 MMHG | BODY MASS INDEX: 17.14 KG/M2 | HEIGHT: 67 IN | DIASTOLIC BLOOD PRESSURE: 69 MMHG | TEMPERATURE: 97.4 F | RESPIRATION RATE: 14 BRPM | WEIGHT: 109.2 LBS

## 2025-06-19 PROBLEM — E53.8 B12 DEFICIENCY: Status: ACTIVE | Noted: 2025-06-19

## 2025-06-19 LAB
AMMONIA PLAS-SCNC: 33 UMOL/L (ref 18–72)
BASE EX.OXY STD BLDV CALC-SCNC: 88.5 % (ref 60–80)
BASE EXCESS BLDV CALC-SCNC: 22.8 MMOL/L
BUN SERPL-MCNC: 26 MG/DL (ref 5–25)
CALCIUM SERPL-MCNC: 9 MG/DL (ref 8.4–10.2)
CHLORIDE SERPL-SCNC: 89 MMOL/L (ref 96–108)
CO2 SERPL-SCNC: >45 MMOL/L (ref 21–32)
CREAT SERPL-MCNC: 0.71 MG/DL (ref 0.6–1.3)
ERYTHROCYTE [DISTWIDTH] IN BLOOD BY AUTOMATED COUNT: 12.1 % (ref 11.6–15.1)
GFR SERPL CREATININE-BSD FRML MDRD: 96 ML/MIN/1.73SQ M
GLUCOSE SERPL-MCNC: 174 MG/DL (ref 65–140)
GLUCOSE SERPL-MCNC: 182 MG/DL (ref 65–140)
GLUCOSE SERPL-MCNC: 83 MG/DL (ref 65–140)
GLUCOSE SERPL-MCNC: 83 MG/DL (ref 65–140)
HCO3 BLDV-SCNC: 51.3 MMOL/L (ref 24–30)
HCT VFR BLD AUTO: 32.4 % (ref 36.5–49.3)
HGB BLD-MCNC: 9.6 G/DL (ref 12–17)
MAGNESIUM SERPL-MCNC: 2 MG/DL (ref 1.9–2.7)
MCH RBC QN AUTO: 30.4 PG (ref 26.8–34.3)
MCHC RBC AUTO-ENTMCNC: 29.6 G/DL (ref 31.4–37.4)
MCV RBC AUTO: 103 FL (ref 82–98)
O2 CT BLDV-SCNC: 13.5 ML/DL
PCO2 BLDV: 82.1 MM HG (ref 42–50)
PH BLDV: 7.41 [PH] (ref 7.3–7.4)
PHOSPHATE SERPL-MCNC: 2.7 MG/DL (ref 2.3–4.1)
PLATELET # BLD AUTO: 204 THOUSANDS/UL (ref 149–390)
PMV BLD AUTO: 9.6 FL (ref 8.9–12.7)
PO2 BLDV: 57.7 MM HG (ref 35–45)
POTASSIUM SERPL-SCNC: 3.9 MMOL/L (ref 3.5–5.3)
RBC # BLD AUTO: 3.16 MILLION/UL (ref 3.88–5.62)
SODIUM SERPL-SCNC: 138 MMOL/L (ref 135–147)
VIT B12 SERPL-MCNC: 247 PG/ML (ref 180–914)
WBC # BLD AUTO: 6.73 THOUSAND/UL (ref 4.31–10.16)

## 2025-06-19 PROCEDURE — 82607 VITAMIN B-12: CPT | Performed by: INTERNAL MEDICINE

## 2025-06-19 PROCEDURE — 94664 DEMO&/EVAL PT USE INHALER: CPT

## 2025-06-19 PROCEDURE — 94640 AIRWAY INHALATION TREATMENT: CPT

## 2025-06-19 PROCEDURE — 97163 PT EVAL HIGH COMPLEX 45 MIN: CPT

## 2025-06-19 PROCEDURE — 82805 BLOOD GASES W/O2 SATURATION: CPT | Performed by: INTERNAL MEDICINE

## 2025-06-19 PROCEDURE — 82140 ASSAY OF AMMONIA: CPT | Performed by: INTERNAL MEDICINE

## 2025-06-19 PROCEDURE — 85027 COMPLETE CBC AUTOMATED: CPT | Performed by: INTERNAL MEDICINE

## 2025-06-19 PROCEDURE — 83735 ASSAY OF MAGNESIUM: CPT | Performed by: INTERNAL MEDICINE

## 2025-06-19 PROCEDURE — 84100 ASSAY OF PHOSPHORUS: CPT | Performed by: INTERNAL MEDICINE

## 2025-06-19 PROCEDURE — 99233 SBSQ HOSP IP/OBS HIGH 50: CPT | Performed by: PHYSICIAN ASSISTANT

## 2025-06-19 PROCEDURE — 82948 REAGENT STRIP/BLOOD GLUCOSE: CPT

## 2025-06-19 PROCEDURE — 71045 X-RAY EXAM CHEST 1 VIEW: CPT

## 2025-06-19 PROCEDURE — 94760 N-INVAS EAR/PLS OXIMETRY 1: CPT

## 2025-06-19 PROCEDURE — 80048 BASIC METABOLIC PNL TOTAL CA: CPT | Performed by: INTERNAL MEDICINE

## 2025-06-19 PROCEDURE — 97167 OT EVAL HIGH COMPLEX 60 MIN: CPT

## 2025-06-19 PROCEDURE — 99239 HOSP IP/OBS DSCHRG MGMT >30: CPT | Performed by: INTERNAL MEDICINE

## 2025-06-19 RX ORDER — AZITHROMYCIN 500 MG/1
500 TABLET, FILM COATED ORAL EVERY 24 HOURS
Qty: 3 TABLET | Refills: 0 | Status: SHIPPED | OUTPATIENT
Start: 2025-06-20 | End: 2025-06-23

## 2025-06-19 RX ORDER — POTASSIUM CHLORIDE 1500 MG/1
20 TABLET, EXTENDED RELEASE ORAL ONCE
Status: COMPLETED | OUTPATIENT
Start: 2025-06-19 | End: 2025-06-19

## 2025-06-19 RX ORDER — CYANOCOBALAMIN 1000 UG/ML
1000 INJECTION, SOLUTION INTRAMUSCULAR; SUBCUTANEOUS DAILY
Status: DISCONTINUED | OUTPATIENT
Start: 2025-06-19 | End: 2025-06-20 | Stop reason: HOSPADM

## 2025-06-19 RX ORDER — IPRATROPIUM BROMIDE AND ALBUTEROL SULFATE 2.5; .5 MG/3ML; MG/3ML
3 SOLUTION RESPIRATORY (INHALATION) 4 TIMES DAILY
Qty: 360 ML | Refills: 0 | Status: SHIPPED | OUTPATIENT
Start: 2025-06-19

## 2025-06-19 RX ORDER — PREDNISONE 10 MG/1
TABLET ORAL
Qty: 30 TABLET | Refills: 0 | Status: SHIPPED | OUTPATIENT
Start: 2025-06-19 | End: 2025-07-01

## 2025-06-19 RX ADMIN — IPRATROPIUM BROMIDE 0.5 MG: 0.5 SOLUTION RESPIRATORY (INHALATION) at 13:33

## 2025-06-19 RX ADMIN — BUDESONIDE 0.5 MG: 0.5 INHALANT RESPIRATORY (INHALATION) at 07:19

## 2025-06-19 RX ADMIN — INSULIN LISPRO 1 UNITS: 100 INJECTION, SOLUTION INTRAVENOUS; SUBCUTANEOUS at 17:23

## 2025-06-19 RX ADMIN — SIMETHICONE 80 MG: 80 TABLET, CHEWABLE ORAL at 12:09

## 2025-06-19 RX ADMIN — POTASSIUM CHLORIDE 20 MEQ: 1500 TABLET, EXTENDED RELEASE ORAL at 09:13

## 2025-06-19 RX ADMIN — ASPIRIN 81 MG CHEWABLE TABLET 81 MG: 81 TABLET CHEWABLE at 09:12

## 2025-06-19 RX ADMIN — BUDESONIDE 0.5 MG: 0.5 INHALANT RESPIRATORY (INHALATION) at 19:03

## 2025-06-19 RX ADMIN — AZITHROMYCIN DIHYDRATE 500 MG: 250 TABLET ORAL at 20:26

## 2025-06-19 RX ADMIN — IPRATROPIUM BROMIDE 0.5 MG: 0.5 SOLUTION RESPIRATORY (INHALATION) at 07:19

## 2025-06-19 RX ADMIN — CYANOCOBALAMIN 1000 MCG: 1000 INJECTION, SOLUTION INTRAMUSCULAR at 09:19

## 2025-06-19 RX ADMIN — ENOXAPARIN SODIUM 40 MG: 40 INJECTION SUBCUTANEOUS at 09:13

## 2025-06-19 RX ADMIN — IPRATROPIUM BROMIDE 0.5 MG: 0.5 SOLUTION RESPIRATORY (INHALATION) at 19:03

## 2025-06-19 RX ADMIN — LEVALBUTEROL HYDROCHLORIDE 1.25 MG: 1.25 SOLUTION RESPIRATORY (INHALATION) at 19:03

## 2025-06-19 RX ADMIN — PREDNISONE 40 MG: 20 TABLET ORAL at 09:13

## 2025-06-19 RX ADMIN — SIMETHICONE 80 MG: 80 TABLET, CHEWABLE ORAL at 09:12

## 2025-06-19 RX ADMIN — LEVALBUTEROL HYDROCHLORIDE 1.25 MG: 1.25 SOLUTION RESPIRATORY (INHALATION) at 13:33

## 2025-06-19 RX ADMIN — INSULIN LISPRO 1 UNITS: 100 INJECTION, SOLUTION INTRAVENOUS; SUBCUTANEOUS at 12:09

## 2025-06-19 RX ADMIN — LEVALBUTEROL HYDROCHLORIDE 1.25 MG: 1.25 SOLUTION RESPIRATORY (INHALATION) at 07:19

## 2025-06-19 NOTE — ASSESSMENT & PLAN NOTE
Suspect this is due to hypercarbic respiratory failure as mentioned above, corticosteroid use may also be contributing  Continue bipap

## 2025-06-19 NOTE — SOCIAL WORK
"  Palliative LSW saw patient at the bedside today. LSW appreciates the opportunity to provider patient/family with inpatient emotional support and guidance while patient continues to receive medical attention from the medical team     Topics discussed: The [facility] SW met with the pt at bedside.  He was on bipap.  The pt wife and son were also by the bedside.  The pt wife stated sh reviewed the POA paperwork but the pt has been confused.  The pt wife and son both stated they were doing \"okay.\"  The [facility] SW inquired about whether they were eating and sleeping.  The pt wife stated they would be going to the cafeteria after the pt crab cakes arrived.  She states these are the only crab cakes the pt likes and will eat.  She was hesitant to leave the pt side.    The pt wife stated that she and the son support one another.  They stated both were fine and had no needs at this time.  They were thankful for the visit.    Areas that need follow-up:ongoing emotional support    Resources given:support    Others present:  The pt son, Serjio    I have spent 15 minutes with Patient and family today in which greater than 50% of this time was spent in counseling/coordination of care regarding Counseling / Coordination of care.       LSW will continue to follow as requested by the medical team, patient, or family   "

## 2025-06-19 NOTE — PLAN OF CARE
Problem: Potential for Falls  Goal: Patient will remain free of falls  Description: INTERVENTIONS:  - Educate patient/family on patient safety including physical limitations  - Instruct patient to call for assistance with activity   - Consider consulting OT/PT to assist with strengthening/mobility based on AM PAC & -HLM score  - Consult OT/PT to assist with strengthening/mobility   - Keep Call bell within reach  - Keep bed low and locked with side rails adjusted as appropriate  - Keep care items and personal belongings within reach  - Initiate and maintain comfort rounds  - Make Fall Risk Sign visible to staff  - Offer Toileting every  Hours, in advance of need  - Initiate/Maintain alarm  - Obtain necessary fall risk management equipment:   - Apply yellow socks and bracelet for high fall risk patients  - Consider moving patient to room near nurses station  Outcome: Progressing     Problem: Prexisting or High Potential for Compromised Skin Integrity  Goal: Skin integrity is maintained or improved  Description: INTERVENTIONS:  - Identify patients at risk for skin breakdown  - Assess and monitor skin integrity including under and around medical devices   - Assess and monitor nutrition and hydration status  - Monitor labs  - Assess for incontinence   - Turn and reposition patient  - Assist with mobility/ambulation  - Relieve pressure over chuyita prominences   - Avoid friction and shearing  - Provide appropriate hygiene as needed including keeping skin clean and dry  - Evaluate need for skin moisturizer/barrier cream  - Collaborate with interdisciplinary team  - Patient/family teaching  - Consider wound care consult    Assess:  - Review Jose scale daily  - Clean and moisturize skin every   - Inspect skin when repositioning, toileting, and assisting with ADLS  - Assess under medical devices such as  every   - Assess extremities for adequate circulation and sensation     Bed Management:  - Have minimal linens on bed &  keep smooth, unwrinkled  - Change linens as needed when moist or perspiring  - Avoid sitting or lying in one position for more than  hours while in bed?Keep HOB adegrees   - Toileting:  - Offer bedside commode  - Assess for incontinence every   - Use incontinent care products after each incontinent episode such as     Activity:  - Mobilize patient  times a day  - Encourage activity and walks on unit  - Encourage or provide ROM exercises   - Turn and reposition patient every  Hours  - Use appropriate equipment to lift or move patient in bed  - Instruct/ Assist with weight shifting every  when out of bed in chair  - Consider limitation of chair time  hour intervals    Skin Care:  - Avoid use of baby powder, tape, friction and shearing, hot water or constrictive clothing  - Relieve pressure over bony prominences using   - Do not massage red bony areas    Next Steps:  - Teach patient strategies to minimize risks such as   - Consider consults to  interdisciplinary teams such as   Outcome: Progressing     Problem: Nutrition/Hydration-ADULT  Goal: Nutrient/Hydration intake appropriate for improving, restoring or maintaining nutritional needs  Description: Monitor and assess patient's nutrition/hydration status for malnutrition. Collaborate with interdisciplinary team and initiate plan and interventions as ordered.  Monitor patient's weight and dietary intake as ordered or per policy. Utilize nutrition screening tool and intervene as necessary. Determine patient's food preferences and provide high-protein, high-caloric foods as appropriate.     INTERVENTIONS:  - Monitor oral intake, urinary output, labs, and treatment plans  - Assess nutrition and hydration status and recommend course of action  - Evaluate amount of meals eaten  - Assist patient with eating if necessary   - Allow adequate time for meals  - Recommend/ encourage appropriate diets, oral nutritional supplements, and vitamin/mineral supplements  - Order,  calculate, and assess calorie counts as needed  - Recommend, monitor, and adjust tube feedings and TPN/PPN based on assessed needs  - Assess need for intravenous fluids  - Provide specific nutrition/hydration education as appropriate  - Include patient/family/caregiver in decisions related to nutrition  Outcome: Progressing

## 2025-06-19 NOTE — ASSESSMENT & PLAN NOTE
Malnutrition Findings: BMI 17, severe cachexia  Adult Malnutrition type: Chronic illness  BMI Findings:  Adult BMI Classifications: Underweight < 18.5   Body mass index is 17.1 kg/m².

## 2025-06-19 NOTE — HOSPICE NOTE
Met pt at the bedside with palliative care DELIA Carter.  Pt on bipap and wife Mellisa at the bedside.  Discussed hospice services and benefits by  guidelines.  Questions answered and support provided.  Natalio and wife Mellisa not ready for hospice.  Pt not ready to stop bipap and verbalized he would return to the hospital.  Reviewed with pt wife how to contact hospice if services are needed in the future.  Hospice will follow until discharge.  IPCM updated

## 2025-06-19 NOTE — ASSESSMENT & PLAN NOTE
Extensive discussion with patient's wife as well as patient in ER Room regarding goals of care in setting of end stage COPD who presents with hypercapnic respiratory failure. Medical recommendation of hospice was made given patient's critically ill status. Patient and wife at bedside aware of recommendation. Patient and wife would like to continue bipap overnight despite minimal improvements in gas and my concerns with patient's overall comfort. We did discuss the concerns with further escalation of care to a ventilator/cpr given patient's frail state/end stage copd and patient as well as wife understanding of this. As such both report that patients code status should be DNR/DNI with that limit set.   Consult palliative care team for further assistance with continued goals of care discussions as patient with end-stage COPD as well as severe cachexia and highly concerned patient is in the end stages of life  Pt has decided against hospice this admission

## 2025-06-19 NOTE — PHYSICAL THERAPY NOTE
"   Physical Therapy Evaluation     Patient's Name: Natalio Hartmann Jr.    Admitting Diagnosis  COPD (chronic obstructive pulmonary disease) ([facility]) [J44.9]  Hypercarbia [R06.89]  Altered mental status [R41.82]  Lethargy [R53.83]  Goals of care, counseling/discussion [Z71.89]    Problem List  Problem List[1]    Past Medical History  Past Medical History[2]    Past Surgical History  Past Surgical History[3]       06/19/25 1051   PT Last Visit   PT Visit Date 06/19/25   Note Type   Note type Evaluation   Pain Assessment   Pain Assessment Tool 0-10   Pain Score No Pain   Restrictions/Precautions   Weight Bearing Precautions Per Order No   Other Precautions Cognitive;Chair Alarm;Bed Alarm;Multiple lines;O2;Fall Risk; visual impairement  (5L O2 to start session, ends session on Bi-PAP with RN present/ aware.)   Home Living   Type of Home House   Home Layout One level;Performs ADLs on one level;Able to live on main level with bedroom/bathroom;Stairs to enter with rails   Bathroom Shower/Tub Walk-in shower   Bathroom Equipment Grab bars in shower;Shower chair;Commode   Home Equipment Walker  (home O2; pt reports he has a WC however it is too wide and unable to fit within home)   Prior Function   Level of [place] Independent with ADLs;Independent with functional mobility;Needs assistance with IADLS   Lives With Spouse;Son   Receives Help From Family   IADLs Family/Friend/Other provides transportation;Family/Friend/Other provides meals;Family/Friend/Other provides medication management   Falls in the last 6 months 0   Vocational Retired   General   Family/Caregiver Present Yes   Cognition   Arousal/Participation Cooperative   Orientation Level Oriented to person;Oriented to place  Oriented to month, reports year as \"2026\"   Following Commands Follows one step commands with increased time or repetition   Comments pt pleasant and cooperative   RLE Assessment   RLE Assessment   (functionally 3+/5)   LLE Assessment   LLE " Assessment   (functionally 3+/5)   Bed Mobility   Supine to Sit 5  Supervision   Additional items HOB elevated;Bedrails;Increased time required   Sit to Supine 5  Supervision   Additional items HOB elevated;Bedrails;Increased time required   Additional Comments pt found/ left supine in bed with all needs within reach- RN present @ end of session   Transfers   Sit to Stand 4  Minimal assistance   Additional items Assist x 1;Increased time required;Verbal cues   Stand to Sit 4  Minimal assistance   Additional items Assist x 1;Increased time required;Verbal cues   Additional Comments transfers with RW, cues for hand placement and sequencing  (pt found to be soiled- standing ~90 sec for hygiene tasks/ bed change. SpO2 dropping to mid 70s on 5L O2, increased SpO2 to 10L, slowly recovers to low 80s- reported to RN who puts pt on Bi-PAP with improved saturations with time)   Ambulation/Elevation   Gait pattern Excessively slow;Short stride;Foward flexed;Inconsistent jonathan;Decreased foot clearance;Improper Weight shift   Gait Assistance 4  Minimal assist   Additional items Assist x 1;Verbal cues;Tactile cues   Assistive Device Rolling walker   Distance 2-3 steps toward HOB   Stair Management Assistance Not tested   Ambulation/Elevation Additional Comments unsteady   Balance   Static Sitting Fair   Dynamic Sitting Fair -   Static Standing Fair -   Dynamic Standing Poor +   Ambulatory Poor   Endurance Deficit   Endurance Deficit Yes   Endurance Deficit Description generalized weakness, fatigue, decreased activity tolerance, SOB/SHARIF   Activity Tolerance   Activity Tolerance Patient limited by fatigue;Other (Comment)  (SOB/SHARIF)   Medical Staff Made Aware MELONY dominguez; co-session completed this date 2* increased medical complexity and multiple co-morbidities   Nurse Made Aware RN cleared   Assessment   Prognosis Fair   Problem List Decreased strength;Decreased endurance;Impaired balance;Decreased mobility   Assessment Pt is a 70  y.o. male seen for PT evaluation s/p admit to [facility] on 6/15/2025. Pt was admitted with a primary dx of: acute on chronic respiratory failure with hypoxia and hypercapnia.  PT now consulted for assessment of mobility and d/c needs. Pt with Activity as tolerated orders.  Pts current comorbidities effecting treatment include: metabolic encephalopathy. Pt  has a past medical history of Acute metabolic encephalopathy (03/29/2022), Arthritis, Bladder mass, Cardiomyopathy ([facility]), Chest pain, COPD (chronic obstructive pulmonary disease) ([facility]), COPD exacerbation ([facility]) (04/28/2023), COVID-19 virus infection (02/23/2023), CPAP (continuous positive airway pressure) dependence, Dependence on respirator (ventilator) status ([facility]) (01/10/2024), Emphysema of lung ([facility]), Hypoxia, Left bundle branch block, Macrocytosis without anemia (05/23/2019), Multiple pulmonary nodules, Osteoarthritis (08/03/2013), Pneumonia, Sleep apnea, obstructive, Smoker, Steroid-induced hyperglycemia (03/30/2023), and Weight loss (08/29/2019). Pts current clinical presentation is Unstable/ Unpredictable (high complexity) due to Ongoing medical management for primary dx, Increased reliance on more restrictive AD compared to baseline, Decreased activity tolerance compared to baseline, Fall risk, Increased assistance needed from caregiver at current time, Cog status, Trending lab values, Continuous pulse oximetry monitoring . Prior to admission, pt was residing with family in 1  with 3 CARLO and was mod I using RW. Upon evaluation, pt currently is requiring S level for bed mobility; min A for transfers; min A for ambulation. Pt presents at PT eval functioning below baseline and currently w/ overall mobility deficits 2* to: BLE weakness, impaired balance, decreased endurance, gait deviations, decreased activity tolerance compared to baseline, decreased functional mobility tolerance compared to baseline, fall risk, SOB upon exertion. Pt currently at  a fall risk 2* to impairments listed above.  Pt will continue to benefit from skilled acute PT interventions to address stated impairments; to maximize functional mobility; for ongoing pt/ family training; and DME needs. At conclusion of PT session pt returned BTB and bed alarm engaged with phone and call bell within reach. Pt denies any further questions at this time. The patient's AM-PAC Basic Mobility Inpatient Short Form Raw Score is 15. A Raw score of less than or equal to 16 suggests the patient may benefit from discharge to post-acute rehabilitation services. Please also refer to the recommendation of the Physical Therapist for safe discharge planning. PT to continue to follow pt t/o hospital stay, recommend level 2 resource intensity upon hospital D/C.   Barriers to Discharge Inaccessible home environment   Goals   Patient Goals to feel better   STG Expiration Date 07/03/25   Short Term Goal #1 STG 1. Pt will be able to perform bed mobility tasks with mod I level in order to improve overall functional mobility and assist in safe d/c. STG 2. Pt with sit EOB for at least 25 minutes at mod I level in order to strengthen abdominal musculature and assist in future transfers/ ambulation. STG 3. Pt will be able to perform functional transfer with mod I level in order to improve overall functional mobility and assist in safe d/c. STG 4. Pt will be able to ambulate at least 150 feet with least restrictive device with mod I level in order to improve overall functional mobility and assist in safe d/c. STG 5. Pt will improve sitting/standing static/dynamic balance 1/2 grade in order to improve functional mobility and assist in safe d/c. STG 6. Pt will improve LE strength by 1/2 grade in order to improve functional mobility and assist in safe d/c. STG 7. Pt will be able to negotiate at least 3 stairs with least restrictive device with min A in order to improve overall functional mobility and assist in safe d/c.   PT  Treatment Day 0   Plan   Treatment/Interventions ADL retraining;Functional transfer training;LE strengthening/ROM;Therapeutic exercise;Endurance training;Patient/family training;Equipment eval/education;Bed mobility;Gait training;Spoke to nursing;Spoke to case management;OT   PT Frequency 2-3x/wk   Discharge Recommendation   Rehab Resource Intensity Level, PT II (Moderate Resource Intensity)   Equipment Recommended Wheelchair   Wheelchair Package Recommended Standard   AM-PAC Basic Mobility Inpatient   Turning in Flat Bed Without Bedrails 3   Lying on Back to Sitting on Edge of Flat Bed Without Bedrails 3   Moving Bed to Chair 3   Standing Up From Chair Using Arms 3   Walk in Room 2   Climb 3-5 Stairs With Railing 1   Basic Mobility Inpatient Raw Score 15   Basic Mobility Standardized Score 36.97   [facility] Highest Level Of Mobility   -HL Goal 4: Move to chair/commode   -[facility] Achieved 5: Stand (1 or more minutes)   Modified [place] Scale   Modified [place] Scale 4         Malia Victor, PT DPT          [1]   Patient Active Problem List  Diagnosis    Nonobstructive atherosclerosis of coronary artery    Nonischemic cardiomyopathy (HCC)    Left bundle-branch block    Obstructive sleep apnea    GERD (gastroesophageal reflux disease)    Prediabetes    Osteoporosis    Vitamin D deficiency    Mood disorder (HCC)    Other insomnia    Goals of care, counseling/discussion    BPH with obstruction/lower urinary tract symptoms    Prostate cancer (HCC)    Pulmonary nodules/lesions, multiple    Acute on chronic systolic congestive heart failure (HCC)    Chronic anemia    Hyponatremia    Physical deconditioning    Hyperlipidemia    Malnutrition (HCC)    Hypochloremic alkalosis    Palliative care by specialist    Chronic hypercapnia/KEVIN    Pulmonary cachexia due to COPD (HCC)    History of bladder cancer    Severe protein-calorie malnutrition (HCC)    End stage COPD (HCC)    Iron deficiency anemia secondary to inadequate  dietary iron intake    SIADH (syndrome of inappropriate ADH production) (HCC)    Type 2 diabetes mellitus without complication, with long-term current use of insulin (HCC)    Chronic respiratory failure with hypoxia and hypercapnia (HCC)    Acute on chronic respiratory failure with hypoxia and hypercapnia (HCC)    Metabolic encephalopathy   [2]   Past Medical History:  Diagnosis Date    Acute metabolic encephalopathy 03/29/2022    Arthritis     Bladder mass     Cardiomyopathy (HCC)     Chest pain     COPD (chronic obstructive pulmonary disease) (HCC)     COPD exacerbation (HCC) 04/28/2023    COVID-19 virus infection 02/23/2023    CPAP (continuous positive airway pressure) dependence     Dependence on respirator (ventilator) status (HCC) 01/10/2024    Emphysema of lung (HCC)     Hypoxia     nocturnal    Left bundle branch block     Macrocytosis without anemia 05/23/2019    Multiple pulmonary nodules     last assessed: 10/12/16    Osteoarthritis 08/03/2013    Pneumonia     Sleep apnea, obstructive     Smoker     Steroid-induced hyperglycemia 03/30/2023    Weight loss 08/29/2019   [3]   Past Surgical History:  Procedure Laterality Date    COLONOSCOPY      CYSTOSCOPY      CYSTOSCOPY  02/17/2021    NE BLADDER INSTILLATION ANTICARCINOGENIC AGENT N/A 1/3/2020    Procedure: INSTILLATION MITOMYCIN;  Surgeon: Sundeep Canchola MD;  [facility]: [facility];  Service: Urology    NE CYSTO W/REMOVAL OF LESIONS SMALL N/A 1/3/2020    Procedure: TRANSURETHRAL RESECTION OF BLADDER TUMOR (TURBT);  Surgeon: Sundeep Canchola MD;  [facility];  Service: Urology    NE CYSTOURETHROSCOPY WITH BIOPSY N/A 4/13/2021    Procedure: CYSTOSCOPY WITH BIOPSIES;  Surgeon: Sundeep Canchola MD;  [facility];  Service: Urology    NE TRURL ELECTROSURG RESCJ PROSTATE BLEED COMPLETE N/A 4/13/2021    Procedure: TRANSURETHRAL RESECTION OF PROSTATE (TURP) BLADDER BIOPSY, FULGURATION;  Surgeon: Sundeep Canchola MD;  [facility];   Service: Urology

## 2025-06-19 NOTE — ASSESSMENT & PLAN NOTE
Appreciate pulmonology input  Patient will continue to use BiPAP at bedtime and as needed during the day.  Per pulmonary will use as needed wakefulness in setting of CO2 retention.  Continued nebulized therapy and treatment of COPD exacerbation  Discussed nature of readmissions increasing in frequency and severity  Consideration of initiating a high utilizer plan

## 2025-06-19 NOTE — ASSESSMENT & PLAN NOTE
Echo in March 2025 with LVEF 25%  On PTA torsemide 40 mg daily and losartan 12.5 mg daily  Due to patient's low blood pressure at baseline and noted systolic blood pressures in the high 90s, holding PTA torsemide as well as losartan.   Appears to be hypovolemic at this time, holding torsemide for now due to contraction alkalosis  Family will monitor his weight outpatient and resume if he develops edema and/or begins gaining weight

## 2025-06-19 NOTE — PROGRESS NOTES
Progress Note - Palliative Care   Name: Natalio Hartmann Jr. 70 y.o. male I MRN: [id number]  Unit/Bed#: [facility] 911-01 I Date of Admission: 6/15/2025   Date of Service: 6/19/2025 I Hospital Day: 4     Assessment & Plan  Goals of care, counseling/discussion  Code Status: DNR - Level 3  Continue current level of care without escalation at this time.  Patient may continue to use BiPAP as indicated.  Hospice consult placed, met with [facility] and hospice on 6/18. Patient and his wife do not desire hospice nor home palliative visits at this time but know both are available should they choose  Of note: patient would need to discontinue or pay privately for BiPAP at home on hospice as device is rented, not owned.  Decisional apparatus:  Patient is  marginally competent on my exam today.  If competence is lost, patient's substitute decision maker would default to spouse by [place] Act [place].  Advance Directive / Living Will / POLST:  none completed    Palliative care by specialist  Palliative Diagnosis: end stage COPD, CHF    Goals:   Level 3 DNR  See goals of care  Palliative will follow for ongoing goals of care discussions as situation evolves.    Social Support:  Patient's support system: spouse, Sarai, and son, Serjio. Patient also has a daughter who lives in [place]  Supportive listening provided  Normalized experience of patient  Palliative  following  Advanced directive provided, patient and his wife will consider completion upon return home  Also introduced and encouraged completion of POLST but they are not ready to complete and patient continues to make conflicting statements about whether or not he wants to return to the hospital    Care Coordination  Case discussed with primary team, family at bedside    Follow-up  We appreciate the opportunity to participate in this patient's care.   We will continue to follow while admitted.   Patient and his wife have declined outpatient and home palliative follow-up at this time  but were encouraged to call should they change their mind and/or if they wish to complete advanced directive or POLST. Alternatively, they can bring these documents to any provider appointment to be scanned to file.  Please do not hesitate to contact our on-call provider through EPIC Secure Chat or contact [phone] should there be an acute change or other symptom control concerns.    Please do not make any changes to symptom medications (opioid analgesics, nonopioid analgesics, antiemetics, etc) without first consulting the on-call Palliative Care provider for your specific campus; including after hours and on weekends. We are available 24/7, and can be contacted on Epic secure chat or at [phone].    Pulmonary cachexia due to COPD (HCC)  Appetite is good, tolerated breakfast and looking forward to lunch  Primary team to prescribe B12 with concern for deficiency    Malnutrition Findings:   Adult Malnutrition type: Chronic illness  Adult Degree of Malnutrition: Other severe protein calorie malnutrition  Malnutrition Characteristics: Fat loss, Muscle loss                  360 Statement: Severe protein calorie malnutrition of chronic illness related to increased energy needs as evidenced by hollow orbits, depression at the temples and protruding clavicles.  Treated with Regular diet and supplements    BMI Findings:  Adult BMI Classifications: Underweight < 18.5        Body mass index is 17.1 kg/m².     End stage COPD (HCC)  Appreciate pulmonology input  Patient will continue to use BiPAP at bedtime and as needed during the day.  Per pulmonary will use as needed wakefulness in setting of CO2 retention.  Continued nebulized therapy and treatment of COPD exacerbation  Discussed nature of readmissions increasing in frequency and severity  Consideration of initiating a high utilizer plan  Acute on chronic respiratory failure with hypoxia and hypercapnia (HCC)    Nonischemic cardiomyopathy (HCC)    Hypochloremic  alkalosis    Metabolic encephalopathy    Interval history:       No events overnight. Patient tolerated BiPAP overnight. Spouse and son at bedside report he had BiPAP removed for breakfast, tolerated well, was at baseline mental status. Desaturated to 70s when working with PT/OT at which time BiPAP was replaced and remains on. He eagerly awaits lunch which he is looking forward to. Otherwise, per family plan for d/c home today. Revisited consideration of POLST today as above, but not completed.     MEDICATIONS / ALLERGIES:     all current active meds have been reviewed    Allergies[1]    OBJECTIVE:    Physical Exam  Physical Exam  Constitutional:       General: He is not in acute distress.     Appearance: He is ill-appearing.   HENT:      Head: Atraumatic.      Comments: Temporal wasting    Cardiovascular:      Rate and Rhythm: Normal rate.   Pulmonary:      Comments: On BiPAP  Abdominal:      Tenderness: There is no guarding.     Musculoskeletal:         General: No swelling.      Comments: Finger glubbing     Skin:     General: Skin is warm and dry.     Neurological:      Mental Status: He is alert.      Comments: Responds appropriately to simple questions but keeps eyes closed. Sleeping when not stimulated   Psychiatric:      Comments: Calm, no agitation         Lab Results:   Results from last 7 days   Lab Units 06/19/25  0524 06/18/25  0614 06/16/25  0612 06/15/25  1856   WBC Thousand/uL 6.73 6.10 5.39 10.87*   HEMOGLOBIN g/dL 9.6* 9.5* 10.3* 11.6*   HEMATOCRIT % 32.4* 32.0* 35.1* 39.1   PLATELETS Thousands/uL 204 205 200 246   MONO PCT %  --   --  0* 4   EOS PCT %  --   --  0 0     Results from last 7 days   Lab Units 06/19/25  0524 06/18/25  0614 06/16/25  0612 06/15/25  1856   POTASSIUM mmol/L 3.9 4.1 4.2 3.9   CHLORIDE mmol/L 89* 86* 86* 80*   CO2 mmol/L >45* >45* >45* >45*   BUN mg/dL 26* 28* 25 25   CREATININE mg/dL 0.71 0.71 0.76 0.87   CALCIUM mg/dL 9.0 8.8 8.4 9.1   ALK PHOS U/L  --   --  57 70   ALT  U/L  --   --  11 14   AST U/L  --   --  20 24       Imaging Studies: reviewed pertinent studies   EKG, Pathology, and Other Studies: reviewed pertinent studies    Counseling / Coordination of Care    Total floor / unit time spent today 45+ minutes. Greater than 50% of total time was spent with the patient and / or family counseling and / or coordination of care. A description of the counseling / coordination of care: psychosocial support, chart review, advanced directives, goals of care, supportive listening, anticipatory guidance, and coordination with primary team, spouse at bedside.                    [1] No Known Allergies

## 2025-06-19 NOTE — DISCHARGE SUMMARY
Discharge Summary - Hospitalist   Name: Natalio Hartmann Jr. 70 y.o. male I MRN: [id number]  Unit/Bed#: [facility] 911-01 I Date of Admission: 6/15/2025   Date of Service: 6/19/2025 I Hospital Day: 4     Assessment & Plan  Acute on chronic respiratory failure with hypoxia and hypercapnia (HCC)  Presented to the ER with respiratory distress and placed on BiPAP  Chest x-ray reviewed with evidence of underlying severe emphysema possible volume overload more noted of the right side on my read awaiting official read   Patient with chronic hypoxic and hypercapnic respiratory failure related primarily to end-stage COPD but also has nonischemic cardiomyopathy with EF 25%; Requires supplemental O2 24/7 as well as BiPAP at night as well as additionally has been needing to use it throughout the day at times  VBG 7.24/137/30/58 initially before BIPAP in the ER.   Continue bronchodilators  PO prednisone taper  Pulmonology following  Bipap at HS and Prn during the day  Nonischemic cardiomyopathy (HCC)  Echo in March 2025 with LVEF 25%  On PTA torsemide 40 mg daily and losartan 12.5 mg daily  Due to patient's low blood pressure at baseline and noted systolic blood pressures in the high 90s, holding PTA torsemide as well as losartan.   Appears to be hypovolemic at this time, holding torsemide for now due to contraction alkalosis  Family will monitor his weight outpatient and resume if he develops edema and/or begins gaining weight  Goals of care, counseling/discussion  Extensive discussion with patient's wife as well as patient in ER Room regarding goals of care in setting of end stage COPD who presents with hypercapnic respiratory failure. Medical recommendation of hospice was made given patient's critically ill status. Patient and wife at bedside aware of recommendation. Patient and wife would like to continue bipap overnight despite minimal improvements in gas and my concerns with patient's overall comfort. We did discuss the concerns with  further escalation of care to a ventilator/cpr given patient's frail state/end stage copd and patient as well as wife understanding of this. As such both report that patients code status should be DNR/DNI with that limit set.   Consult palliative care team for further assistance with continued goals of care discussions as patient with end-stage COPD as well as severe cachexia and highly concerned patient is in the end stages of life  Pt has decided against hospice this admission  Pulmonary cachexia due to COPD (HCC)  Malnutrition Findings: BMI 17, severe cachexia  Adult Malnutrition type: Chronic illness  BMI Findings:  Adult BMI Classifications: Underweight < 18.5   Body mass index is 17.1 kg/m².     End stage COPD (HCC)  Follows with Dr. Luque with pulmonology outpatient and reviewed her note from just 2 days ago which makes this diagnosis quite clear  On supplemental O2 24/7 as well as trilogy noninvasive ventilator at night as well as additionally it appears needing to use this trilogy sometimes during the day  Thought to be main driving force behind his admission for respiratory failure today with possible additional component of CHF  Treating with scheduled nebulizers as well as IV Solu-Medrol and azithromycin  BiPAP PRN and HS  Plan as above  Declining hospice  Palliative care by specialist  As above  Hypochloremic alkalosis  Likely due to contaction alkalosis from OP torsemide use  D/w with pulmonology earlier today, they do not recommend diamox  Continue bipap at HS and PRN  Will supplement chloride with PO KCl  Metabolic encephalopathy  Suspect this is due to hypercarbic respiratory failure as mentioned above, corticosteroid use may also be contributing  Continue bipap  B12 deficiency  Start supplementation     Medical Problems       Resolved Problems  Date Reviewed: 4/11/2025          Resolved    Hypotension 6/18/2025     Resolved by  Bubba Velasquez MD        Discharging Physician / Practitioner: Bubba AQUINO  "MD Eva  PCP: Fatmata Gustafson DO  Admission Date:   Admission Orders (From admission, onward)       Ordered        06/15/25 2220  INPATIENT ADMISSION  Once                          Discharge Date: 06/19/25    Next Steps for Physician/AP Assuming Care:  Monitor BMP and weights  Resume torsemide when euvolemic  Resume losartan if BP increases    Test Results Pending at Discharge (will require follow up):  None    Medication Changes for Discharge & Rationale:   As mentioned above  See after visit summary for reconciled discharge medications provided to patient and/or family.     Consultations During Hospital Stay:  Pulmonology  Palliative care  Hospice service    Procedures Performed:   None    Significant Findings / Test Results:   Respiratory acidosis  Contraction alkalosis    Incidental Findings:   None       Hospital Course:   Natalio Hartmann Jr. is a 70 y.o. male patient who originally presented to the hospital on 6/15/2025 due to shortness of breath. He was admitted and placed on bipap with IV steroids. He was also noted to have contraction alkalosis so torsemide and losartan were held. During his hospital course he had a pulmonology evaluation who recommended palliative care and hospice for end stage COPD. Due to his wish to continue bipap he did not agree to hospice. He duoneb was refill. Prednisone taper restarted. He was counseled on proper bipap use and discharged home. He was noted to be severely malnourished and deconditioned, IP rehabilitation was recommended but declined as well.    Please see above list of diagnoses and related plan for additional information.     Discharge Day Visit / Exam:   Subjective:  denies any complaints and feels ready to go home.  Vitals: Blood Pressure: 99/60 (06/19/25 1323)  Pulse: 73 (06/19/25 1323)  Temperature: 97.6 °F (36.4 °C) (06/19/25 1323)  Temp Source: Oral (06/19/25 1323)  Respirations: 18 (06/19/25 1323)  Height: 5' 7\" (170.2 cm) (06/16/25 0844)  Weight - " Scale: 49.5 kg (109 lb 3.2 oz) (06/19/25 0540)  SpO2: 95 % (06/19/25 1323)  Physical Exam  Constitutional:       Appearance: Normal appearance.   HENT:      Head: Normocephalic and atraumatic.      Nose: Nose normal.     Eyes:      Extraocular Movements: Extraocular movements intact.       Cardiovascular:      Rate and Rhythm: Normal rate and regular rhythm.   Pulmonary:      Breath sounds: No wheezing or rales.     Musculoskeletal:      Right lower leg: No edema.      Left lower leg: No edema.     Skin:     General: Skin is warm and dry.     Neurological:      Mental Status: He is alert and oriented to person, place, and time.     Psychiatric:         Mood and Affect: Mood normal.         Behavior: Behavior normal.          Discussion with Family: Updated  (wife and son) at bedside.    Discharge instructions/Information to patient and family:   See after visit summary for information provided to patient and family.      Provisions for Follow-Up Care:  See after visit summary for information related to follow-up care and any pertinent home health orders.      Mobility at time of Discharge:   Basic Mobility Inpatient Raw Score: 15  -HLM Goal: 4: Move to chair/commode  JH-HLM Achieved: 5: Stand (1 or more minutes)  HLM Goal NOT achieved. Continue to encourage mobility in post discharge setting.     Disposition:   Home    Planned Readmission: No    Administrative Statements   Discharge Statement:  I have spent a total time of 45 minutes in caring for this patient on the day of the visit/encounter. >30 minutes of time was spent on: Diagnostic results, Prognosis, Risks and benefits of tx options, Instructions for management, Patient and family education, Importance of tx compliance, Impressions, Counseling / Coordination of care, Documenting in the medical record, Reviewing / ordering tests, medicine, procedures  , and Communicating with other healthcare professionals .    **Please Note: This note may  have been constructed using a voice recognition system**

## 2025-06-19 NOTE — ASSESSMENT & PLAN NOTE
Presented to the ER with respiratory distress and placed on BiPAP  Chest x-ray reviewed with evidence of underlying severe emphysema possible volume overload more noted of the right side on my read awaiting official read   Patient with chronic hypoxic and hypercapnic respiratory failure related primarily to end-stage COPD but also has nonischemic cardiomyopathy with EF 25%; Requires supplemental O2 24/7 as well as BiPAP at night as well as additionally has been needing to use it throughout the day at times  VBG 7.24/137/30/58 initially before BIPAP in the ER.   Continue bronchodilators  PO prednisone taper  Pulmonology following  Bipap at HS and Prn during the day

## 2025-06-19 NOTE — ASSESSMENT & PLAN NOTE
Follows with Dr. Luque with pulmonology outpatient and reviewed her note from just 2 days ago which makes this diagnosis quite clear  On supplemental O2 24/7 as well as trilogy noninvasive ventilator at night as well as additionally it appears needing to use this trilogy sometimes during the day  Thought to be main driving force behind his admission for respiratory failure today with possible additional component of CHF  Treating with scheduled nebulizers as well as IV Solu-Medrol and azithromycin  BiPAP PRN and HS  Plan as above  Declining hospice

## 2025-06-19 NOTE — ASSESSMENT & PLAN NOTE
Palliative Diagnosis: end stage COPD, CHF    Goals:   Level 3 DNR  See goals of care  Palliative will follow for ongoing goals of care discussions as situation evolves.    Social Support:  Patient's support system: spouse, Sarai, and son, Serjio. Patient also has a daughter who lives in [place]  Supportive listening provided  Normalized experience of patient  Palliative  following  Advanced directive provided, patient and his wife will consider completion upon return home  Also introduced and encouraged completion of POLST but they are not ready to complete and patient continues to make conflicting statements about whether or not he wants to return to the hospital    Care Coordination  Case discussed with primary team, family at bedside    Follow-up  We appreciate the opportunity to participate in this patient's care.   We will continue to follow while admitted.   Patient and his wife have declined outpatient and home palliative follow-up at this time but were encouraged to call should they change their mind and/or if they wish to complete advanced directive or POLST. Alternatively, they can bring these documents to any provider appointment to be scanned to file.  Please do not hesitate to contact our on-call provider through [employer] Secure Chat or contact [phone] should there be an acute change or other symptom control concerns.    Please do not make any changes to symptom medications (opioid analgesics, nonopioid analgesics, antiemetics, etc) without first consulting the on-call Palliative Care provider for your specific campus; including after hours and on weekends. We are available 24/7, and can be contacted on [place] chat or at [phone].

## 2025-06-19 NOTE — CASE MANAGEMENT
Case Management Discharge Planning Note    Patient name Natalio Hartmann Jr.  Location [facility] 911/[facility] 911- MRN [id number]  : 1955 Date 2025       Current Admission Date: 6/15/2025  Current Admission Diagnosis:Acute on chronic respiratory failure with hypoxia and hypercapnia (HCC)   Patient Active Problem List    Diagnosis Date Noted    Metabolic encephalopathy 2025    Acute on chronic respiratory failure with hypoxia and hypercapnia (HCC) 2025    Chronic respiratory failure with hypoxia and hypercapnia ([facility]) 2025    Iron deficiency anemia secondary to inadequate dietary iron intake 2024    SIADH (syndrome of inappropriate ADH production) ([facility]) 2024    Type 2 diabetes mellitus without complication, with long-term current use of insulin ([facility]) 2024    End stage COPD ([facility]) 2024    Severe protein-calorie malnutrition ([facility]) 10/14/2023    History of bladder cancer 2023    Pulmonary cachexia due to COPD ([facility])     Chronic hypercapnia/KEVIN 2023    Palliative care by specialist 2023    Hypochloremic alkalosis 2023    Malnutrition ([facility]) 06/10/2023    Hyperlipidemia 2023    Physical deconditioning 2023    Chronic anemia 2023    Hyponatremia 2023    Acute on chronic systolic congestive heart failure ([facility]) 2022    Pulmonary nodules/lesions, multiple 2022    Prostate cancer ([facility]) 2021    BPH with obstruction/lower urinary tract symptoms 2021    Goals of care, counseling/discussion 2021    Other insomnia 2019    GERD (gastroesophageal reflux disease) 2017    Nonobstructive atherosclerosis of coronary artery 2016    Mood disorder ([facility]) 2015    Prediabetes 2015    Osteoporosis 10/14/2014    Vitamin D deficiency 10/14/2014    Nonischemic cardiomyopathy ([facility]) 2014    Left bundle-branch block 2013    Obstructive sleep apnea 2013      LOS (days): 4  Geometric  Mean LOS (GMLOS) (days): 3.5  Days to GMLOS:-0.1     OBJECTIVE:  Risk of Unplanned Readmission Score: 28.07         Current admission status: Inpatient   Preferred Pharmacy:   [id number] - [place], [place] - [address]  [address]  [place] [address]  Phone: [phone] Fax: [phone]    Primary Care Provider: Fatmata Gustafson,     Primary Insurance: BLUE CROSS  Secondary Insurance: MEDICARE    DISCHARGE DETAILS:    DME Referral Provided  Referral made for DME?: Yes  DME referral completed for the following items:: Wheelchair  DME Supplier Name:: [employer]    Other Referral/Resources/Interventions Provided:  Referral Comments: Pt and wife are declining SNF and HHC for therapies. They would like a compact WC, pt WC is too big to navigate. Referral sent in [place].    Call made to Mellisa to confirm, received her VM, left message for call back.     CM spoke with Mellisa at bedside. Confirmed they are declining services, they did want Acute but pt is not appropriate. They are declining HHC and SNF.  They do want the lightweight WC delivered to hospital to ensure it is the correct size for their home.  They are okay with [employer], but if they do not cover it , they will want referral sent to [facility] to compare.      CM following

## 2025-06-19 NOTE — ASSESSMENT & PLAN NOTE
Code Status: DNR - Level 3  Continue current level of care without escalation at this time.  Patient may continue to use BiPAP as indicated.  Hospice consult placed, met with [facility] and hospice on 6/18. Patient and his wife do not desire hospice nor home palliative visits at this time but know both are available should they choose  Of note: patient would need to discontinue or pay privately for BiPAP at home on hospice as device is rented, not owned.  Decisional apparatus:  Patient is  marginally competent on my exam today.  If competence is lost, patient's substitute decision maker would default to spouse by [place] 169.  Advance Directive / Living Will / POLST:  none completed

## 2025-06-19 NOTE — PLAN OF CARE
Problem: OCCUPATIONAL THERAPY ADULT  Goal: Performs self-care activities at highest level of function for planned discharge setting.  See evaluation for individualized goals.  Description: Treatment Interventions: ADL retraining, Functional transfer training, Endurance training, Patient/family training, Equipment evaluation/education, Compensatory technique education, Continued evaluation, Energy conservation, Activityengagement          See flowsheet documentation for full assessment, interventions and recommendations.   Note: Limitation: Decreased ADL status, Decreased endurance, Decreased self-care trans, Decreased high-level ADLs     Assessment: Pt is a 70 y.o. male seen for OT evaluation s/p admission to [facility] on 6/15/2025. Pt diagnosed with Acute on chronic respiratory failure with hypoxia and hypercapnia ([facility]). Pt has a significant PMH impacting occupational performance including: Acute metabolic encephalopathy, Arthritis, Bladder mass, Cardiomyopathy ([facility]), Chest pain, COPD (chronic obstructive pulmonary disease) ([facility]), COPD exacerbation ([facility]), COVID-19 virus infection, CPAP (continuous positive airway pressure) dependence, Dependence on respirator (ventilator) status ([facility]), Emphysema of lung ([facility]), Hypoxia, Left bundle branch block, Macrocytosis without anemia, Multiple pulmonary nodules, Osteoarthritis, Pneumonia, Sleep apnea, obstructive, Smoker, Steroid-induced hyperglycemia, and Weight loss. Pt with active OT evaluation and treatment orders and activity orders. PTA, pt living with his spouse and son in a 1 SH w/ 3 CARLO. Pt reports I w/ ADLs and fxnl mobility and requires A w/ IADLs at baseline; - driving. Pt agreeable and willing to participate in OT evaluation. During evaluation, pt was S for eating, min A for grooming and UB ADLs, mod-max A for LB ADLs, and max A for toileting. Pt also required S for bed mobility and min Ax1 for transfer and fxnl mobility w/ RW. Performance deficits  that affect the pt’s occupational performance during the initial evaluation include decreased ADL status, decreased activity tolerance, decreased endurance, decreased sitting tolerance, decreased sitting balance, decreased standing tolerance, decreased standing balance, decreased transfer skills, decreased fxnl mobility, generalized weakness , and SHARIF . Based on pt’s functional performance and deficits the following occupations will be addressed in OT treatments in order to maximize pt’s independence and overall occupational performance: grooming, bathing/showering, toileting and toilet hygiene, dressing, and functional mobility. Goals are listed below.  From OT perspective, recommend Level II (Moderate Resource Intensity) upon d/c when pt medically stable to d/c from acute care. Will continue to follow.     Rehab Resource Intensity Level, OT: II (Moderate Resource Intensity)

## 2025-06-19 NOTE — DISCHARGE INSTR - AVS FIRST PAGE
Weight yourself every day, if you see weight gain of 3-4 pounds restart taking torsemide.  Use your trilogy machine every night while sleeping and during the day if tired or napping.  Stop taking losartan and follow up with your cardiologist to determine when then can be resumed.

## 2025-06-19 NOTE — PLAN OF CARE
Problem: PHYSICAL THERAPY ADULT  Goal: Performs mobility at highest level of function for planned discharge setting.  See evaluation for individualized goals.  Description: Treatment/Interventions: ADL retraining, Functional transfer training, LE strengthening/ROM, Therapeutic exercise, Endurance training, Patient/family training, Equipment eval/education, Bed mobility, Gait training, Spoke to nursing, Spoke to case management, OT  Equipment Recommended: Wheelchair       See flowsheet documentation for full assessment, interventions and recommendations.  6/19/2025 1238 by Malia Victor PT  Note: Prognosis: Fair  Problem List: Decreased strength, Decreased endurance, Impaired balance, Decreased mobility  Assessment: Pt is a 70 y.o. male seen for PT evaluation s/p admit to [facility] on 6/15/2025. Pt was admitted with a primary dx of: acute on chronic respiratory failure with hypoxia and hypercapnia.  PT now consulted for assessment of mobility and d/c needs. Pt with Activity as tolerated orders.  Pts current comorbidities effecting treatment include: metabolic encephalopathy. Pt  has a past medical history of Acute metabolic encephalopathy (03/29/2022), Arthritis, Bladder mass, Cardiomyopathy ([facility]), Chest pain, COPD (chronic obstructive pulmonary disease) ([facility]), COPD exacerbation ([facility]) (04/28/2023), COVID-19 virus infection (02/23/2023), CPAP (continuous positive airway pressure) dependence, Dependence on respirator (ventilator) status ([facility]) (01/10/2024), Emphysema of lung ([facility]), Hypoxia, Left bundle branch block, Macrocytosis without anemia (05/23/2019), Multiple pulmonary nodules, Osteoarthritis (08/03/2013), Pneumonia, Sleep apnea, obstructive, Smoker, Steroid-induced hyperglycemia (03/30/2023), and Weight loss (08/29/2019). Pts current clinical presentation is Unstable/ Unpredictable (high complexity) due to Ongoing medical management for primary dx, Increased reliance on more restrictive AD compared  to baseline, Decreased activity tolerance compared to baseline, Fall risk, Increased assistance needed from caregiver at current time, Cog status, Trending lab values, Continuous pulse oximetry monitoring . Prior to admission, pt was residing with family in 1  with 3 CARLO and was mod I using RW. Upon evaluation, pt currently is requiring S level for bed mobility; min A for transfers; min A for ambulation. Pt presents at PT eval functioning below baseline and currently w/ overall mobility deficits 2* to: BLE weakness, impaired balance, decreased endurance, gait deviations, decreased activity tolerance compared to baseline, decreased functional mobility tolerance compared to baseline, fall risk, SOB upon exertion. Pt currently at a fall risk 2* to impairments listed above.  Pt will continue to benefit from skilled acute PT interventions to address stated impairments; to maximize functional mobility; for ongoing pt/ family training; and DME needs. At conclusion of PT session pt returned BTB and bed alarm engaged with phone and call bell within reach. Pt denies any further questions at this time. The patient's AM-PAC Basic Mobility Inpatient Short Form Raw Score is 15. A Raw score of less than or equal to 16 suggests the patient may benefit from discharge to post-acute rehabilitation services. Please also refer to the recommendation of the Physical Therapist for safe discharge planning. PT to continue to follow pt t/o hospital stay, recommend level 2 resource intensity upon hospital D/C.  Barriers to Discharge: Inaccessible home environment     Rehab Resource Intensity Level, PT: II (Moderate Resource Intensity)    See flowsheet documentation for full assessment.

## 2025-06-19 NOTE — ASSESSMENT & PLAN NOTE
Appetite is good, tolerated breakfast and looking forward to lunch  Primary team to prescribe B12 with concern for deficiency    Malnutrition Findings:   Adult Malnutrition type: Chronic illness  Adult Degree of Malnutrition: Other severe protein calorie malnutrition  Malnutrition Characteristics: Fat loss, Muscle loss                  360 Statement: Severe protein calorie malnutrition of chronic illness related to increased energy needs as evidenced by hollow orbits, depression at the temples and protruding clavicles.  Treated with Regular diet and supplements    BMI Findings:  Adult BMI Classifications: Underweight < 18.5        Body mass index is 17.1 kg/m².

## 2025-06-19 NOTE — OCCUPATIONAL THERAPY NOTE
Occupational Therapy Evaluation     Patient Name: Natalio Hartmann Jr.  Today's Date: 6/19/2025  Problem List  Principal Problem:    Acute on chronic respiratory failure with hypoxia and hypercapnia (HCC)  Active Problems:    Nonischemic cardiomyopathy (HCC)    Goals of care, counseling/discussion    Hypochloremic alkalosis    Palliative care by specialist    Pulmonary cachexia due to COPD (HCC)    End stage COPD (HCC)    Metabolic encephalopathy    Past Medical History  Past Medical History[1]  Past Surgical History  Past Surgical History[2]        06/19/25 1050   OT Last Visit   OT Visit Date 06/19/25   Note Type   Note type Evaluation   Pain Assessment   Pain Assessment Tool 0-10   Pain Score No Pain   Restrictions/Precautions   Weight Bearing Precautions Per Order No   Other Precautions Cognitive;Chair Alarm;Bed Alarm;Multiple lines;O2;Fall Risk  (5LO2 at start of session and on bi-pap at end of session)   Home Living   Type of Home House   Home Layout One level;Performs ADLs on one level;Able to live on main level with bedroom/bathroom  (3 CARLO)   Bathroom Shower/Tub Walk-in shower   Bathroom Toilet Standard   Bathroom Equipment Grab bars in shower;Shower chair;Commode;Grab bars around toilet   Home Equipment Walker;Wheelchair-manual;Hospital bed   Prior Function   Level of [place] Independent with ADLs;Independent with functional mobility;Needs assistance with IADLS   Lives With Spouse;Son   Receives Help From Family   IADLs Family/Friend/Other provides transportation;Family/Friend/Other provides meals;Family/Friend/Other provides medication management   Falls in the last 6 months 0   Vocational Retired   Lifestyle   Autonomy Pt reports I w/ ADLs and fxnl mobility and requires A w/ IADLs at baseline; - driving.   Reciprocal Relationships Pt lives w/ his spouse and son.   Service to Others Pt is retired.   Intrinsic Gratification Pt enjoys building model cars.   General   Family/Caregiver Present No   ADL    Where Assessed Edge of bed   Eating Assistance 5  Supervision/Setup   Grooming Assistance 4  Minimal Assistance   UB Bathing Assistance 4  Minimal Assistance   LB Bathing Assistance 3  Moderate Assistance   UB Dressing Assistance 4  Minimal Assistance   LB Dressing Assistance 2  Maximal Assistance   Toileting Assistance  2  Maximal Assistance   Toileting Deficit Setup;Verbal cueing;Supervison/safety;Increased time to complete;Perineal hygiene  (Pt incontinent of bowel upon therapist's arrival; required A w/ hygiene while standing w/ Ax1.)   Bed Mobility   Supine to Sit 5  Supervision   Additional items HOB elevated;Bedrails;Increased time required;Verbal cues   Sit to Supine 5  Supervision   Additional items HOB elevated;Bedrails;Increased time required;Verbal cues   Additional Comments Pt supine in bed at end of OT evaluation w/ alarm activated and all needs within reach.   Transfers   Sit to Stand 4  Minimal assistance   Additional items Assist x 1;Increased time required;Verbal cues   Stand to Sit 4  Minimal assistance   Additional items Assist x 1;Increased time required;Verbal cues   Additional Comments w/ RW for support   Functional Mobility   Functional Mobility 4  Minimal assistance   Additional Comments Pt took few steps along EOB w/ min Ax1 using RW.  (SpO2 in low 70s after fxnl mobility; increased to low 80's w/ increased O2. RN placed pt on bi-pap at this time.)   Additional items Rolling walker   Balance   Static Sitting Fair +   Dynamic Sitting Fair   Static Standing Fair -   Dynamic Standing Poor +   Ambulatory Poor +   Activity Tolerance   Activity Tolerance Patient limited by fatigue;Treatment limited secondary to medical complications (Comment)  (SOB/SHARIF)   Medical Staff Made Aware PTMalia, due to pt's medical complexity and multiple comorbidities   Nurse Made Aware RN clearance prior to session   RUE Assessment   RUE Assessment WFL   LUE Assessment   LUE Assessment WFL   Hand Function   Gross  Motor Coordination Functional   Fine Motor Coordination Functional   Cognition   Arousal/Participation Alert;Responsive;Cooperative   Attention Attends with cues to redirect   Orientation Level Oriented to person;Oriented to place;Disoriented to time  (Pt generally oriented to time as he was able to recall month but not year.)   Following Commands Follows one step commands with increased time or repetition   Comments Pt was identified by name and . Pt was pleasant, cooperative, and willing to participate in OT evaluation.   Assessment   Limitation Decreased ADL status;Decreased endurance;Decreased self-care trans;Decreased high-level ADLs   Assessment Pt is a 70 y.o. male seen for OT evaluation s/p admission to [facility] on 6/15/2025. Pt diagnosed with Acute on chronic respiratory failure with hypoxia and hypercapnia ([facility]). Pt has a significant PMH impacting occupational performance including: Acute metabolic encephalopathy, Arthritis, Bladder mass, Cardiomyopathy ([facility]), Chest pain, COPD (chronic obstructive pulmonary disease) ([facility]), COPD exacerbation ([facility]), COVID-19 virus infection, CPAP (continuous positive airway pressure) dependence, Dependence on respirator (ventilator) status ([facility]), Emphysema of lung ([facility]), Hypoxia, Left bundle branch block, Macrocytosis without anemia, Multiple pulmonary nodules, Osteoarthritis, Pneumonia, Sleep apnea, obstructive, Smoker, Steroid-induced hyperglycemia, and Weight loss. Pt with active OT evaluation and treatment orders and activity orders. PTA, pt living with his spouse and son in a 1 SH w/ 3 CARLO. Pt reports I w/ ADLs and fxnl mobility and requires A w/ IADLs at baseline; - driving. Pt agreeable and willing to participate in OT evaluation. During evaluation, pt was S for eating, min A for grooming and UB ADLs, mod-max A for LB ADLs, and max A for toileting. Pt also required S for bed mobility and min Ax1 for transfer and fxnl mobility w/ RW. Performance deficits  that affect the pt’s occupational performance during the initial evaluation include decreased ADL status, decreased activity tolerance, decreased endurance, decreased sitting tolerance, decreased sitting balance, decreased standing tolerance, decreased standing balance, decreased transfer skills, decreased fxnl mobility, generalized weakness , and SHARIF . Based on pt’s functional performance and deficits the following occupations will be addressed in OT treatments in order to maximize pt’s independence and overall occupational performance: grooming, bathing/showering, toileting and toilet hygiene, dressing, and functional mobility. Goals are listed below.  From OT perspective, recommend Level II (Moderate Resource Intensity) upon d/c when pt medically stable to d/c from acute care. Will continue to follow.   Goals   Patient Goals to feel better   LTG Time Frame 10-14   Plan   Treatment Interventions ADL retraining;Functional transfer training;Endurance training;Patient/family training;Equipment evaluation/education;Compensatory technique education;Continued evaluation;Energy conservation;Activityengagement   Goal Expiration Date 07/03/25   OT Treatment Day 0   OT Frequency 2-3x/wk   Discharge Recommendation   Rehab Resource Intensity Level, OT II (Moderate Resource Intensity)   -PAC Daily Activity Inpatient   Lower Body Dressing 2   Bathing 2   Toileting 2   Upper Body Dressing 3   Grooming 3   Eating 3   Daily Activity Raw Score 15   Daily Activity Standardized Score (Calc for Raw Score >=11) 34.69   -PAC Applied Cognition Inpatient   Following a Speech/Presentation 3   Understanding Ordinary Conversation 4   Taking Medications 3   Remembering Where Things Are Placed or Put Away 4   Remembering List of 4-5 Errands 3   Taking Care of Complicated Tasks 3   Applied Cognition Raw Score 20   Applied Cognition Standardized Score 41.76       The patient's raw score on the -PAC Daily Activity Inpatient Short Form is  15. A raw score of less than 19 suggests the patient may benefit from discharge to post-acute rehabilitation services. Please refer to the recommendation of the Occupational Therapist for safe discharge planning.    Goals:      - Pt will complete UB ADLs w/ S to maximize independence and return home.    - Pt will complete LB ADLs w/ mod A to maximize independence and return home.     - Pt will complete toileting routine (transfers, hygiene, and clothing management) w/ mod A to maximize independence and return to prior level of function.    - Pt will complete bed mobility supine >< sit w/ mod I to maximize independence and return home.    - Pt will transfer to bed, chair, and toilet w/ S using AD / DME as needed to maximize independence and reduce burden of care.     - Pt will short ambulate household distances w/ S using least restrictive device to maximize independence and return home.     - Pt will increase activity tolerance (and sitting tolerance) by eating all meals OOB in the chair.      - Pt will tolerate therapeutic activities for greater than 30 minutes in order to increase tolerance for functional activities.     - Pt will participate in ongoing OT evaluation of cognitive skills to assist with safe d/c planning/recommendations.      NOLAN Walton, OTR/L              [1]   Past Medical History:  Diagnosis Date    Acute metabolic encephalopathy 03/29/2022    Arthritis     Bladder mass     Cardiomyopathy ([facility])     Chest pain     COPD (chronic obstructive pulmonary disease) ([facility])     COPD exacerbation ([facility]) 04/28/2023    COVID-19 virus infection 02/23/2023    CPAP (continuous positive airway pressure) dependence     Dependence on respirator (ventilator) status ([facility]) 01/10/2024    Emphysema of lung ([facility])     Hypoxia     nocturnal    Left bundle branch block     Macrocytosis without anemia 05/23/2019    Multiple pulmonary nodules     last assessed: 10/12/16    Osteoarthritis 08/03/2013    Pneumonia      Sleep apnea, obstructive     Smoker     Steroid-induced hyperglycemia 03/30/2023    Weight loss 08/29/2019   [2]   Past Surgical History:  Procedure Laterality Date    COLONOSCOPY      CYSTOSCOPY      CYSTOSCOPY  02/17/2021    WA BLADDER INSTILLATION ANTICARCINOGENIC AGENT N/A 1/3/2020    Procedure: INSTILLATION MITOMYCIN;  Surgeon: Sundeep Canchola MD;  [facility];  Service: Urology    WA CYSTO W/REMOVAL OF LESIONS SMALL N/A 1/3/2020    Procedure: TRANSURETHRAL RESECTION OF BLADDER TUMOR (TURBT);  Surgeon: Sundeep Canchola MD;  [facility];  Service: Urology    WA CYSTOURETHROSCOPY WITH BIOPSY N/A 4/13/2021    Procedure: CYSTOSCOPY WITH BIOPSIES;  Surgeon: Sundeep Canchola MD;  [facility];  Service: Urology    WA TRURL ELECTROSURG RESCJ PROSTATE BLEED COMPLETE N/A 4/13/2021    Procedure: TRANSURETHRAL RESECTION OF PROSTATE (TURP) BLADDER BIOPSY, FULGURATION;  Surgeon: Sundeep Canchola MD;  [facility];  Service: Urology

## 2025-06-20 ENCOUNTER — TRANSITIONAL CARE MANAGEMENT (OUTPATIENT)
Age: 70
End: 2025-06-20

## 2025-06-20 LAB

## 2025-06-20 NOTE — PLAN OF CARE
Problem: Potential for Falls  Goal: Patient will remain free of falls  Description: INTERVENTIONS:  - Educate patient/family on patient safety including physical limitations  - Instruct patient to call for assistance with activity   - Consider consulting OT/PT to assist with strengthening/mobility based on AM PAC & -HLM score  - Consult OT/PT to assist with strengthening/mobility   - Keep Call bell within reach  - Keep bed low and locked with side rails adjusted as appropriate  - Keep care items and personal belongings within reach  - Initiate and maintain comfort rounds  - Make Fall Risk Sign visible to staff  - Offer Toileting every 2 Hours, in advance of need  - Initiate/Maintain bed/chair alarm  - Apply yellow socks and bracelet for high fall risk patients  - Consider moving patient to room near nurses station  Outcome: Adequate for Discharge     Problem: Prexisting or High Potential for Compromised Skin Integrity  Goal: Skin integrity is maintained or improved  Description: INTERVENTIONS:  - Identify patients at risk for skin breakdown  - Assess and monitor skin integrity including under and around medical devices   - Assess and monitor nutrition and hydration status  - Monitor labs  - Assess for incontinence   - Turn and reposition patient  - Assist with mobility/ambulation  - Relieve pressure over chuyita prominences   - Avoid friction and shearing  - Provide appropriate hygiene as needed including keeping skin clean and dry  - Evaluate need for skin moisturizer/barrier cream  - Collaborate with interdisciplinary team  - Patient/family teaching  - Consider wound care consult    Assess:  - Review Jose scale daily  - Inspect skin when repositioning, toileting, and assisting with ADLS  - Assess under medical devices such as bipap every 2 hours  - Assess extremities for adequate circulation and sensation     Bed Management:  - Have minimal linens on bed & keep smooth, unwrinkled  - Change linens as needed  when moist or perspiring  - Toileting:  - Offer bedside commode    Activity:  - Mobilize patient 3 times a day  - Encourage activity and walks on unit  - Encourage or provide ROM exercises   - Use appropriate equipment to lift or move patient in bed    Skin Care:  - Avoid use of baby powder, tape, friction and shearing, hot water or constrictive clothing  - Do not massage red bony areas  Outcome: Adequate for Discharge

## 2025-06-20 NOTE — UTILIZATION REVIEW
NOTIFICATION OF ADMISSION DISCHARGE   This is a Notification of Discharge from [facility]. Please be advised that this patient has been discharge from our facility. Below you will find the admission and discharge date and time including the patient’s disposition.   UTILIZATION REVIEW CONTACT:  Utilization Review Assistants  Network Utilization Review Department  Phone: [phone] x carefully listen to the prompts. All voicemails are confidential.  Email: [email or web address]     ADMISSION INFORMATION  PRESENTATION DATE: 6/15/2025  6:43 PM  OBERVATION ADMISSION DATE: N/A  INPATIENT ADMISSION DATE: 6/15/25 10:21 PM   DISCHARGE DATE: 6/19/2025 10:35 PM   DISPOSITION:Home/Self Care    Network Utilization Review Department  ATTENTION: Please call with any questions or concerns to [phone] and carefully listen to the prompts so that you are directed to the right person. All voicemails are confidential.   For Discharge needs, contact Care Management DC Support Team at [phone] opt. [phone]  Send all requests for admission clinical reviews, approved or denied determinations and any other requests to dedicated fax number below belonging to the campus where the patient is receiving treatment. List of dedicated fax numbers for the Facilities:  FACILITY NAME UR FAX NUMBER   ADMISSION DENIALS (Administrative/Medical Necessity) [phone]   DISCHARGE SUPPORT TEAM ([facility]) [phone]   PARENT CHILD HEALTH (Maternity/NICU/Pediatrics) [phone]   General acute hospital [phone]   [facility] [phone]   [facility] [phone]   [facility] [phone]   [facility] [phone]   [facility] [phone]   [facility] [phone]   [facility] [phone]   [facility]  [facility] [phone]   [facility] [phone]   [facility] [phone]   [facility] [phone]

## 2025-06-23 RX ORDER — BUDESONIDE 0.5 MG/2ML
0.5 INHALANT ORAL 2 TIMES DAILY
Qty: 1080 ML | Refills: 1 | Status: SHIPPED | OUTPATIENT
Start: 2025-06-23

## 2025-06-24 ENCOUNTER — TELEPHONE (OUTPATIENT)
Age: 70
End: 2025-06-24

## 2025-06-24 NOTE — TELEPHONE ENCOUNTER
Cyndi a nurse with [employer] called inquirng if provider was aware of discharge from hospital recently, advised he has follow up appointment scheduled for 6/30, stated she will be faxing over discharge paperwork incase it was needed.     Also left her contact information should Natalio need any care coordination services.    Cyndi [phone] ex [phone]

## 2025-07-07 ENCOUNTER — NURSE TRIAGE (OUTPATIENT)
Age: 70
End: 2025-07-07

## 2025-07-07 NOTE — TELEPHONE ENCOUNTER
"REASON FOR CONVERSATION: Leg Swelling    SYMPTOMS: swelling ankles/feet    OTHER HEALTH INFORMATION:     PROTOCOL DISPOSITION: See Within 3 Days in Office    CARE ADVICE PROVIDED:   Elevate feet bilaterally    PRACTICE FOLLOW-UP:   Contact HF- spoke to [place] and warm transferred call      Reason for Disposition   MILD swelling of both ankles (i.e., pedal edema) AND new-onset or getting worse    Answer Assessment - Initial Assessment Questions  1. ONSET: \"When did the swelling start?\" (e.g., minutes, hours, days)     ongoing  2. LOCATION: \"What part of the leg is swollen?\"  \"Are both legs swollen or just one leg?\"      Ankles/feet  3. SEVERITY: \"How bad is the swelling?\" (e.g., localized; mild, moderate, severe)      Mild-moderate  4. REDNESS: \"Does the swelling look red or infected?\"      Redness slightly on feet   5. PAIN: \"Is the swelling painful to touch?\" If Yes, ask: \"How painful is it?\"   (Scale 1-10; mild, moderate or severe)      Denies   6. FEVER: \"Do you have a fever?\" If Yes, ask: \"What is it, how was it measured, and when did it start?\"       Denies   7. CAUSE: \"What do you think is causing the leg swelling?\"      chf  8. MEDICAL HISTORY: \"Do you have a history of blood clots (e.g., DVT), cancer, heart failure, kidney disease, or liver failure?\"      chf  9. RECURRENT SYMPTOM: \"Have you had leg swelling before?\" If Yes, ask: \"When was the last time?\" \"What happened that time?\"      Ongoing   10. OTHER SYMPTOMS: \"Do you have any other symptoms?\" (e.g., chest pain, difficulty breathing)        Denies  Always has the SOB- new symptom  Weight yesterday 101.9 LBS, weight is down    Protocols used: Leg Swelling and Edema-Adult-[place]    "

## 2025-07-07 NOTE — TELEPHONE ENCOUNTER
Returned call to Mellisa, as she had requested for her to be called rather than patient, who does not answer his phone. Left a detailed message reading Dottie Medrano PA-C, orders & instructions. Left c/b number with request for her to return call to nurse.    As per Dottie, recommended patient come into office tomorrow for a 10:40 AM urgent office visit with Chantel Shetty PA-C, for further assessment.    When Mellisa returns a call, if I am unable to get the call, please confirm the above.    Thank you.

## 2025-07-07 NOTE — TELEPHONE ENCOUNTER
Mellisa returned call. Read her Dottie Medrano PA-C orders & instructions for patient.  Mellisa verbalized understanding & stated patient will take another Torsemide 20 mg today.  Booked patient for OV tomorrow with Chantel Shetty PA-C for further assessment.

## 2025-07-07 NOTE — TELEPHONE ENCOUNTER
Received transfer call to patient's wife Mellisa who stated patient's feet & ankles are extremely swollen & feet are reddened for the last several days. Patient is having difficulty walking in their home due to swelling.   Stated patient is always SOB & remains @ baseline SOB currently.    Yesterday's Wt - 101.8 lb. Home scale  6/15 [facility] Wt - 109 lb 3.2 oz    Mellisa stated patient was told to start back on Torsemide 20 mg daily for feet swelling. Does not know who stated this, but patient has been taking daily Torsemide with poor results in helping swelling.     Stated patient is trying to follow a low sodium diet but not always compliant. Not on fluid restrictions.  Patient is Palliative care for end-stage COPD.

## 2025-07-08 ENCOUNTER — DOCUMENTATION (OUTPATIENT)
Dept: CARDIOLOGY CLINIC | Facility: CLINIC | Age: 70
End: 2025-07-08

## 2025-07-08 ENCOUNTER — OFFICE VISIT (OUTPATIENT)
Dept: CARDIOLOGY CLINIC | Facility: CLINIC | Age: 70
End: 2025-07-08
Payer: COMMERCIAL

## 2025-07-08 VITALS
HEART RATE: 85 BPM | DIASTOLIC BLOOD PRESSURE: 68 MMHG | OXYGEN SATURATION: 99 % | SYSTOLIC BLOOD PRESSURE: 112 MMHG | BODY MASS INDEX: 17.1 KG/M2 | HEIGHT: 67 IN

## 2025-07-08 DIAGNOSIS — I42.8 NONISCHEMIC CARDIOMYOPATHY (HCC): ICD-10-CM

## 2025-07-08 DIAGNOSIS — J44.9 END STAGE COPD (HCC): ICD-10-CM

## 2025-07-08 DIAGNOSIS — I50.22 CHRONIC HFREF (HEART FAILURE WITH REDUCED EJECTION FRACTION) (HCC): Primary | ICD-10-CM

## 2025-07-08 DIAGNOSIS — Z09 HOSPITAL DISCHARGE FOLLOW-UP: ICD-10-CM

## 2025-07-08 PROCEDURE — 99214 OFFICE O/P EST MOD 30 MIN: CPT | Performed by: PHYSICIAN ASSISTANT

## 2025-07-08 PROCEDURE — 96374 THER/PROPH/DIAG INJ IV PUSH: CPT

## 2025-07-08 RX ORDER — FUROSEMIDE 10 MG/ML
60 INJECTION INTRAMUSCULAR; INTRAVENOUS ONCE
Status: COMPLETED | OUTPATIENT
Start: 2025-07-08 | End: 2025-07-08

## 2025-07-08 RX ORDER — POTASSIUM CHLORIDE 750 MG/1
10 TABLET, EXTENDED RELEASE ORAL DAILY
Qty: 30 TABLET | Refills: 3 | Status: SHIPPED | OUTPATIENT
Start: 2025-07-08 | End: 2025-07-17 | Stop reason: SDUPTHER

## 2025-07-08 RX ORDER — TORSEMIDE 20 MG/1
40 TABLET ORAL DAILY
Qty: 60 TABLET | Refills: 3 | Status: SHIPPED | OUTPATIENT
Start: 2025-07-08 | End: 2025-07-17 | Stop reason: SDUPTHER

## 2025-07-08 RX ADMIN — FUROSEMIDE 60 MG: 10 INJECTION INTRAMUSCULAR; INTRAVENOUS at 11:07

## 2025-07-08 NOTE — PROGRESS NOTES
Advanced Heart Failure / Pulmonary Hypertension Outpatient Visit    Natalio Hartmann Jr. 70 y.o. male   MRN: [id number]  Encounter: [id number]    Assessment:  Patient Active Problem List    Diagnosis Date Noted    Iron deficiency anemia secondary to inadequate dietary iron intake 05/17/2024    SIADH (syndrome of inappropriate ADH production) ([facility]) 05/17/2024    Type 2 diabetes mellitus without complication, with long-term current use of insulin ([facility]) 05/17/2024    End stage COPD ([facility]) 02/08/2024    Severe protein-calorie malnutrition ([facility]) 10/14/2023    History of bladder cancer 07/31/2023    Pulmonary cachexia due to COPD ([facility])     Chronic hypercapnia/KEVIN 07/20/2023    Palliative care by specialist 06/13/2023    Hypochloremic alkalosis 06/12/2023    Malnutrition ([facility]) 06/10/2023    Hyperlipidemia 05/02/2023    Physical deconditioning 02/21/2023    Chronic anemia 02/17/2023    Hyponatremia 02/17/2023    Acute on chronic systolic congestive heart failure ([facility]) 09/12/2022    Pulmonary nodules/lesions, multiple 03/27/2022    Prostate cancer ([facility]) 05/24/2021    BPH with obstruction/lower urinary tract symptoms 04/13/2021    Goals of care, counseling/discussion 02/25/2021    Other insomnia 02/18/2019    GERD (gastroesophageal reflux disease) 01/05/2017    Nonobstructive atherosclerosis of coronary artery 02/25/2016    Mood disorder ([facility]) 08/18/2015    Prediabetes 02/19/2015    Osteoporosis 10/14/2014    Vitamin D deficiency 10/14/2014    Nonischemic cardiomyopathy ([facility]) 09/26/2014    Left bundle-branch block 08/03/2013    Obstructive sleep apnea 07/29/2013    B12 deficiency 06/19/2025    Metabolic encephalopathy 06/18/2025    Acute on chronic respiratory failure with hypoxia and hypercapnia ([facility]) 06/16/2025    Chronic respiratory failure with hypoxia and hypercapnia ([facility]) 06/13/2025       Today's Plan:  Diuretics held on discharge from recent admission. Torsemide resumed earlier this week, minimal response per patient's wife.  "Will give 60 mg IV Lasix in clinic today and resume torsemide 40 mg QD tomorrow with potassium supplementation.  BMP ordered today.  RTC one week.    Plan:  Chronic HFrEF; LVEF 25%  Etiology: nonischemic...\"Possible dysynchrony from the chronic LBBB.  Despite QRS only being 120ms, the echo shows significant dysynchrony.\"     Weighed 109 lbs 6/19. Today, not able to be weighed in clinic, reports 106 lbs at home yesterday.       Guideline-Directed Medical Therapy:  --Beta Blocker: No, end-stage COPD.   --ARNi / ACEi / ARB: losartan 12.5 mg daily, held after recent admission due to hypotension.  --Aldosterone Antagonist: No.   --SGLT2 Inhibitor: No.      Volume Management:  --Diuretic: torsemide 40 mg daily     Sudden Cardiac Death Risk Reduction:  --LVEF 25%. Full code during 05/2025 admission.   --Candidacy for BiV device: LBBB with  ms.      Advanced Therapies: Not a candidate due to significant lung disease.      End-stage COPD  Palliative care patient  Chronic respiratory failure with hypoxia and hypercapnia   Coronary artery disease, nonobstructive  Hyperlipidemia  Obstructive sleep apnea  History of tobacco abuse  Prediabetes  Chronic hyponatremia     HPI:   Natalio Hartmann Jr. is a 70-year-old man with a PMH as above who presents to the office for hospital follow-up. Follows with Dr. CHELSEA Funez.      \"Presented to the hospital on 5/13/2025 due to increased edema.  He took an extra torsemide at home with no relief.  Patient also endorsed some mild dyspnea.  Patient found to have mild volume overload in the setting of dietary indiscretion and increased salt intake.  Volume overload resolved with Lasix 40 mg IV x 2 doses.  Discussed salt restriction at length with patient.  Arranged for outpatient cardiology follow-up on 5/21.  Patient discharged home in stable condition\"     06/02/2025 with MJ: Patient presents with his spouse and son for hospital follow-up. Feeling okay today. Continues with ongoing fatigue, " decreased appetite, and SHARIF. Patient's wife reports worsening lower extremity swelling and abdominal distention since returning home. Patient reports robust urinary response to torsemide. Continues to eat frozen/microwavable breakfast bowls as well as eggs, oatmeal, Ensure shakes, and 4-6 bananas a day.    Admitted to [facility] from 6/15 to 6/19 for acute on chronic respiratory failure with hypoxia and hypercapnia.  Multiple conversations with inpatient team and hospice team about transitioning to hospice, but patient reportedly not ready given that BiPAP is not covered under hospice.  Torsemide and home dose losartan held during admission due to hypotension and contraction alkalosis.    7/8/2025: Presents today for follow-up.  Noted swelling in his lower extremities worsening over the past several days.  Chronically short of breath due to end-stage COPD, remains on 4 L nasal cannula.  No change.  Torsemide had been held on discharge, so initially had resumed 20 mg daily yesterday due to swelling, but then took an additional 20 mg when instructed to do so over the phone.  Struggling with adequate oral intake at home, drinks protein shakes.    Past Medical History[1]  Review of Systems   Constitutional:  Positive for unexpected weight change. Negative for chills and fever.   HENT:  Negative for ear pain and sore throat.    Eyes:  Negative for pain and visual disturbance.   Respiratory:  Positive for shortness of breath. Negative for cough.    Cardiovascular:  Positive for leg swelling. Negative for chest pain and palpitations.   Gastrointestinal:  Negative for abdominal pain and vomiting.   Genitourinary:  Negative for dysuria and hematuria.   Musculoskeletal:  Negative for arthralgias and back pain.   Skin:  Negative for color change and rash.   Neurological:  Negative for seizures and syncope.   All other systems reviewed and are negative.  14-point ROS completed and negative except as stated above and/or in the  HPI.      Allergies[2]  Current Medications[3]    Social History     Socioeconomic History    Marital status: /Civil Union     Spouse name: Not on file    Number of children: Not on file    Years of education: Not on file    Highest education level: Not on file   Occupational History    Not on file   Tobacco Use    Smoking status: Former     Current packs/day: 0.00     Average packs/day: 2.5 packs/day for 42.0 years (105.0 ttl pk-yrs)     Types: Cigarettes     Start date:      Quit date:      Years since quittin.5    Smokeless tobacco: Former    Tobacco comments:     1 ppd for 37 years, 2010 down to 5 cigs a day, is around second hand smoke   Vaping Use    Vaping status: Never Used   Substance and Sexual Activity    Alcohol use: Not Currently     Comment: rarely    Drug use: No    Sexual activity: Not Currently     Partners: Female   Other Topics Concern    Not on file   Social History Narrative    Daily coffee consumption: 8 or more cups a day        Used to work in [employer].  On disability.     Social Drivers of Health     Financial Resource Strain: Not on file   Food Insecurity: No Food Insecurity (2025)    Nursing - Inadequate Food Risk Classification     Worried About Running Out of Food in the Last Year: Not on file     Ran Out of Food in the Last Year: Not on file     Ran Out of Food in the Last Year: Never true   Transportation Needs: No Transportation Needs (2025)    Nursing - Transportation Risk Classification     Lack of Transportation: Not on file     Lack of Transportation: No   Physical Activity: Not on file   Stress: Not on file   Social Connections: Not on file   Intimate Partner Violence: Unknown (2025)    Nursing IPS     Feels Physically and Emotionally Safe: Not on file     Physically Hurt by Someone: Not on file     Humiliated or Emotionally Abused by Someone: Not on file     Physically Hurt by Someone: No     Hurt or Threatened by Someone: No   Housing  "Stability: Unknown (6/19/2025)    Nursing: Inadequate Housing Risk Classification     Has Housing: Not on file     Worried About Losing Housing: Not on file     Unable to Get Utilities: Not on file     Unable to Pay for Housing in the Last Year: No     Has Housing: No     Family History[4]    Vitals:  Blood pressure 112/68, pulse 85, height 5' 7\" (1.702 m), SpO2 99%.  Body mass index is 17.1 kg/m².  Wt Readings from Last 10 Encounters:   06/19/25 49.5 kg (109 lb 3.2 oz)   05/13/25 43 kg (94 lb 12.8 oz)   04/04/25 43 kg (94 lb 14.4 oz)   02/21/25 44.3 kg (97 lb 11.2 oz)   09/06/24 44.9 kg (99 lb)   08/13/24 45.2 kg (99 lb 9.6 oz)   07/29/24 44.7 kg (98 lb 9.6 oz)   04/15/24 44.4 kg (97 lb 12.8 oz)   03/11/24 45 kg (99 lb 4.8 oz)   02/04/24 45.4 kg (100 lb)     Vitals:    07/08/25 1037   BP: 112/68   BP Location: Left arm   Patient Position: Sitting   Cuff Size: Child   Pulse: 85   SpO2: 99%   Height: 5' 7\" (1.702 m)       Physical Exam  Constitutional:       General: He is not in acute distress.     Appearance: Normal appearance. He is ill-appearing.   HENT:      Head: Normocephalic.      Nose: Nose normal.     Eyes:      Conjunctiva/sclera: Conjunctivae normal.     Neck:      Vascular: JVD present.     Cardiovascular:      Rate and Rhythm: Normal rate and regular rhythm.      Comments: 2+ pitting edema distal lower extremities  Pulmonary:      Effort: Pulmonary effort is normal.     Musculoskeletal:      Cervical back: Neck supple.     Skin:     General: Skin is dry.     Neurological:      General: No focal deficit present.      Mental Status: He is alert and oriented to person, place, and time.     Psychiatric:         Mood and Affect: Mood normal.         Behavior: Behavior normal.         Labs & Results:  Lab Results   Component Value Date    WBC 6.73 06/19/2025    HGB 9.6 (L) 06/19/2025    HCT 32.4 (L) 06/19/2025     (H) 06/19/2025     06/19/2025     Lab Results   Component Value Date    SODIUM 138 " 06/19/2025    K 3.9 06/19/2025    CL 89 (L) 06/19/2025    CO2 >45 (H) 06/19/2025    BUN 26 (H) 06/19/2025    CREATININE 0.71 06/19/2025    GLUC 83 06/19/2025    CALCIUM 9.0 06/19/2025     Lab Results   Component Value Date    INR 0.79 (L) 06/16/2025    INR 0.84 (L) 03/14/2025    INR 0.78 (L) 07/19/2023    PROTIME 11.3 (L) 06/16/2025    PROTIME 11.9 (L) 03/14/2025    PROTIME 11.1 (L) 07/19/2023     Lab Results   Component Value Date     (H) 05/13/2025      Lab Results   Component Value Date    NTBNP 17,569 (H) 04/30/2023        Thank you for the opportunity to participate in the care of this patient.    Chantel Ye PA-C          [1]   Past Medical History:  Diagnosis Date    Acute metabolic encephalopathy 03/29/2022    Arthritis     Bladder mass     Cardiomyopathy ([facility])     Chest pain     COPD (chronic obstructive pulmonary disease) ([facility])     COPD exacerbation ([facility]) 04/28/2023    COVID-19 virus infection 02/23/2023    CPAP (continuous positive airway pressure) dependence     Dependence on respirator (ventilator) status ([facility]) 01/10/2024    Emphysema of lung ([facility])     Hypoxia     nocturnal    Left bundle branch block     Macrocytosis without anemia 05/23/2019    Multiple pulmonary nodules     last assessed: 10/12/16    Osteoarthritis 08/03/2013    Pneumonia     Sleep apnea, obstructive     Smoker     Steroid-induced hyperglycemia 03/30/2023    Weight loss 08/29/2019   [2] No Known Allergies  [3]   Current Outpatient Medications:     albuterol (PROVENTIL HFA,VENTOLIN HFA) 90 mcg/act inhaler, Inhale 2 puffs every 6 (six) hours as needed for wheezing or shortness of breath, Disp: 18 g, Rfl: 11    Aspirin Low Dose 81 MG chewable tablet, CHEW 1 TABLET BY MOUTH DAILY, Disp: 90 tablet, Rfl: 1    budesonide (PULMICORT) 0.5 mg/2 mL nebulizer solution, Take 2 mL (0.5 mg total) by nebulization 2 (two) times a day Rinse mouth after use., Disp: 1080 mL, Rfl: 1    ipratropium-albuterol (DUO-NEB) 0.5-2.5 mg/3 mL nebulizer  solution, Take 3 mL by nebulization 4 (four) times a day, Disp: 360 mL, Rfl: 0    potassium chloride (Klor-Con M10) 10 mEq tablet, Take 1 tablet (10 mEq total) by mouth daily, Disp: 30 tablet, Rfl: 3    torsemide (DEMADEX) 20 mg tablet, Take 2 tablets (40 mg total) by mouth daily, Disp: 60 tablet, Rfl: 3    [Paused] losartan (COZAAR) 25 mg tablet, TAKE 1/2 TABLET BY MOUTH EVERY DAY, Disp: 45 tablet, Rfl: 1  No current facility-administered medications for this visit.  [4]   Family History  Problem Relation Name Age of Onset    Diabetes Mother

## 2025-07-08 NOTE — PATIENT INSTRUCTIONS
History   Chief Complaint:  Generalized Weakness     The history is limited by the condition of the patient.      Jesus Alberto Porras is a 79 year old male with history of metastatic neuroendocrine malignancy, diagnosed on liver biopsy on 5/13/2021 who presents via EMS from hospice with generalized weakness. He was admitted to the hospital on 5/30/2021 for shortness of breath and a worsening cough, diagnosed with pneumonia. He was discharged to hospice on 6/4. Today his significant other noticed he was weaker and more lethargic. He states it is not hard for him to breathe and he is not complaining of any pain.     Review of Systems   Unable to perform ROS: Mental status change (ROS supplemented by patient's significant other)         Allergies:  Dilantin [Phenytoin]  Amoxicillin  Carbamazepine    Medications:  Oxycodone   Klonopin  Singulair   Compazine    Past Medical History:    Metastatic neuroendocrine malignancy  CSF leak from ear  GERD  Seizures   Hypoxia   Encephalocele     Past Surgical History:    Cholecystectomy   Craniotomy   Eye surgery   Drain lumbar insertion   Liver biopsy     Social History:  Presented alone via EMS but significant other was in transit.   Former smoker but quit in 2005.   Slight alcohol use of 2-3 beers a week.     Physical Exam     Patient Vitals for the past 24 hrs:   BP Temp Temp src Pulse Resp SpO2   06/09/21 1401 -- -- -- -- -- 95 %   06/09/21 1400 127/77 -- -- 85 (!) 34 95 %   06/09/21 1345 126/82 -- -- 80 (!) 39 96 %   06/09/21 1343 -- -- -- -- -- 95 %   06/09/21 1300 (!) 127/91 -- -- 86 24 --   06/09/21 1215 130/53 -- -- 79 26 100 %   06/09/21 1200 (!) 160/63 -- -- 91 (!) 31 100 %   06/09/21 1119 (!) 154/94 98.4  F (36.9  C) Oral 75 18 97 %       Physical Exam  Constitutional: Vital signs reviewed as above.   Head: No external signs of trauma noted.  Eyes: Pupils are equal, round, and reactive to light.   Oropharynx: Tacky MM  Neck: No JVD noted  Cardiovascular: Normal rate,  You received IV Lasix in the office today.    potassium pills from your pharmacy. Begin taking 10 mEq daily potassium tonight.  Our nurse will call you tomorrow to see how you're doing.   Have blood work completed later this week.  We will see you again in the office early next week.    Please weigh yourself every day (after emptying your bladder) and keep a detailed log of weights.   Contact Cardiology at [phone] if you gain 3+ lbs overnight or 5+ lbs in 5-7 days.  Limit daily sodium/salt intake to 2000 mg daily to prevent fluid retention.  Avoid canned foods, fast food/Chinese food, and processed meats (hot dogs, lunch meat, and sausage etc.). Caution with condiments.  Limit fluid intake to 2000 mL or 2 liters (about 60-65 ounces) daily.  Avoid electrolyte replacement drinks (such as Gatorade, Pedialyte, Propel, Liquid IV, etc.).  Bring complete list of medications and log of daily weights to your follow-up appointment.    Restart torsemide at 40 mg daily tomorrow with 10 mEq of potassium daily.   iregular rhythm and normal heart sounds.  No murmur heard. Equal B/L peripheral pulses.  Pulmonary/Chest: Mild respiratory distress. Patient has no wheezes.  Decreased breath sounds in the left lower lung fields.  Gastrointestinal: Soft. There is no tenderness.   Musculoskeletal/Extremities: B/L ankle/foot swelling. Normal tone.  Neurological: Patient is lethargic. He answers some simple yes/no questions.   Skin: Skin is warm and dry. There is no diaphoresis noted.   Psychiatric: The patient appears calm.    Emergency Department Course   ECG  ECG taken at 1156, ECG read at 1159  NSR with sinus arrhythmia   No significant change as compared to prior, dated 5/30/21.  Rate 78 bpm. TN interval 120 ms. QRS duration 92 ms. QT/QTc 366/417 ms. P-R-T axes 43 23 23    Imaging:  XR Chest Port View 1:  Stable elevated right hemidiaphragm. Minimal pleural fluid   bilaterally. Question some patchy areas of ground-glass infiltrate   bilaterally, as per radiology.     Laboratory:  CBC: WBC 17.3 (H), HGB 12.3 (L),   BMP: Glucose 100 (H), Chloride: 92 (L), CO2 >45 (HH), o/w WNL (Creatinine: 0.45 (L))    ISTAT gases lactate janeen POCT: Ph Venous 7.43, PCO2 Venous 85 (HH), PO2 Venous 95 (H), Bicarbonate Venous 57 (HH), O2 Sat Venous 97, Lactic Acid 1.8    Asymptomatic COVID PCR: Pending     Emergency Department Course:    Reviewed:  I reviewed nursing notes, vitals, past medical history and care everywhere    Assessments:  ED Course as of Jun 09 1439   Wed Jun 09, 2021   1125 Evaluated the patient and performed an exam as above.         1342 Updated the patient on results and discussed plan of care.      1422 D/W Leann Perez PA-C, accepting for Dr. Luciano      1427 Patient indicated to nursing that his back hurt. Patient's significant other notes that he takes oral morphine at home, but is unsure of dose.          Interventions:  Medications   morphine (PF) injection 4 mg (4 mg Intravenous Given 6/9/21 1439)  "        Disposition:  The patient was admitted to the hospital under the care of Dr. Luciano.     Impression & Plan     CMS Diagnoses: None    Medical Decision Making:  This 79-year-old male patient presents to the ED due to mental status change and weakness.  Please see the HPI and exam for specifics.  Initially, the patient arrived alone and I did not have any corroborating information.  The patient's significant other arrived later and noted that after his discharge from this facility on June 4, they were set up with home hospice.  She notes that yesterday the patient was able to converse with some friends for about an hour.  Sometime later, he began to get more altered.  She notes that he would try to struggle to get out of his recliner and she would have to consistently remind him to get back in.  At one point he did slide out and someone fall though she was able to get him back in with great difficulty.     The patient significant other notes that she reached out to somebody (it sounds like from the hospice service) about some other kind of living situation and she was told that they could look into it and potentially have a different place for him in \"a couple of days\".  I wonder if this means some kind of hospice facility rather than being at home.  Because the significant other was concerned for her ability to care for the patient, she presents to the ED.     The patient's POLST specifically states DO NOT RESUSCITATE and focus on comfort measures only (no CPAP/BiPAP, or intubation).  Based on family's concern for their ability to care for the patient, we will plan on admitting the patient and that hospice can be involved in the hospital.    Covid-19  Jesus Alberto Porras was evaluated during a global COVID-19 pandemic, which necessitated consideration that the patient might be at risk for infection with the SARS-CoV-2 virus that causes COVID-19.   Applicable protocols for evaluation were followed during the " patient's care.   COVID-19 was considered as part of the patient's evaluation. The plan for testing is:  a test was obtained during this visit.    Diagnosis:    ICD-10-CM    1. Altered mental status, unspecified altered mental status type  R41.82 Asymptomatic SARS-CoV-2 COVID-19 Virus (Coronavirus) by PCR       Discharge Medications:  New Prescriptions    No medications on file       Scribe Disclosure:  IGala, am serving as a scribe trainee under Maria Del Carmen Horner at 11:19 AM on 6/9/2021 to document services personally performed by Bello Bermudez DO based on my observations and the provider's statements to me.        Bello Bermudez DO  06/09/21 [id number]     99

## 2025-07-08 NOTE — PROGRESS NOTES
IV Lasix 60 mg given in Left FA without difficulty. IV needle d/c'd intact.    Post BP- 110/50    UO -100 ml clear yellow urine    Talked about low sodium diet and fluid restriction.  Wife and pt aware I will call tomorrow for update.

## 2025-07-09 ENCOUNTER — TELEPHONE (OUTPATIENT)
Dept: CARDIOLOGY CLINIC | Facility: CLINIC | Age: 70
End: 2025-07-09

## 2025-07-09 ENCOUNTER — TELEPHONE (OUTPATIENT)
Age: 70
End: 2025-07-09

## 2025-07-09 NOTE — TELEPHONE ENCOUNTER
Spoke with wife. She di not get his wt yet today, but edema has improved.    Advised her to call office if symptoms are getting worse or has rapid wt gain.  Verbally understood.

## 2025-07-16 NOTE — PROGRESS NOTES
Heart Failure Outpatient Progress Note - Natalio Hartmann Jr. 70 y.o. male MRN: [id number]    @ Encounter: [id number]      Assessment/Plan:    Patient Active Problem List    Diagnosis Date Noted    Physical deconditioning 02/21/2023    Chronic anemia 02/17/2023    Acute on chronic systolic congestive heart failure (HCC) 09/12/2022    Pulmonary nodules/lesions, multiple 03/27/2022    Prostate cancer (Formerly [facility]) 05/24/2021    BPH with obstruction/lower urinary tract symptoms 04/13/2021    Other insomnia 02/18/2019    GERD (gastroesophageal reflux disease) 01/05/2017    Nonobstructive atherosclerosis of coronary artery 02/25/2016    Mood disorder (Formerly [facility]) 08/18/2015    Prediabetes 02/19/2015    Osteoporosis 10/14/2014    Vitamin D deficiency 10/14/2014    Nonischemic cardiomyopathy (Formerly [facility]) 09/26/2014    Left bundle-branch block 08/03/2013    Obstructive sleep apnea 07/29/2013    B12 deficiency 06/19/2025    Metabolic encephalopathy 06/18/2025    Acute on chronic respiratory failure with hypoxia and hypercapnia (Formerly [facility]) 06/16/2025    Chronic respiratory failure with hypoxia and hypercapnia (Formerly [facility]) 06/13/2025    Iron deficiency anemia secondary to inadequate dietary iron intake 05/17/2024    SIADH (syndrome of inappropriate ADH production) (Formerly [facility]) 05/17/2024    Type 2 diabetes mellitus without complication, with long-term current use of insulin (Formerly [facility]) 05/17/2024    End stage COPD ([facility]) 02/08/2024    Severe protein-calorie malnutrition (Formerly [facility]) 10/14/2023    History of bladder cancer 07/31/2023    Pulmonary cachexia due to COPD (Former[facility])     Chronic hypercapnia/KEVIN 07/20/2023    Palliative care by specialist 06/13/2023    Hypochloremic alkalosis 06/12/2023    Malnutrition ([facility]) 06/10/2023    Hyperlipidemia 05/02/2023    Hyponatremia 02/17/2023    Goals of care, counseling/discussion 02/25/2021       Chronic HFrEF; LVEF 20-25%; NYHA III; ACC/AHA Stage C/D  -Etiology: Nonischemic cardiomyopathy.  Possible dyssynchrony from chronic LBBB.  Despite QRS  only being 120 ms, echo shows significant dyssynchrony.  -examines volume overloaded, mildly hypoxic. Would not get on the scale today  -will increase Torsemide to 40 mg BID x 4 days with 10 meq of Kdur BID x 4 days, then go back to Torsemide 40 mg daily  -Will have HF RN check in with patient next week.   -discussed role of palliative care for symptom management, he remains uninterested.        Weighed 115 lb on 5/10, 105 lb on 5/16/23, 104 lb 5/18, 103 lbs, 96 lbs, 97 lbs, 99 lbs, 97 lbs, 97 lbs.      TTE 9/6/24: LVEF 25%  TTE 5/1/23: LVEF 20%. Severe global hypokinesis with regional variation. RV cavity size normal, systolic function normal. Trace MR, TR.   TTE 2/21/23: LVEF 30%. LVIDd 6.6cm. severe global hypokinesis. Grade I DD. RV cavity size and systolic function normal. Mild TR.   TTE 2/12/2023: LVEF 20-25%.  Severe global hypokinesis with regional variation.  Grade 1 DD.  Abnormal septal motion consistent with LBBB.  RV cavity mildly dilated, normal systolic function.  Mild MR, mild TR.  RVSP 38.  Dilated IVC.  TTE 8/26/2022: LVEF 40%.  RV cavity mildly dilated.  Systolic function normal.  Mild MR, TR.  Normal IVC.     Neurohormonal Blockade: GDMT limited by hypotension  --Beta Blocker: no  --ARNi / ACEi / ARB: losartan 12.5 mg QD, off   --Aldosterone Antagonist: no   --SGLT2 Inhibitor: no  --Home Diuretic: torsemide 40 mg QD ---increase to BID x 4 days.      Sudden Cardiac Death Risk Reduction:  --ICD: LVEF 20%.      Electrical Resynchronization:  --Candidacy for BiV device: QRSd 120ms, chronic LBBB with dyssynchrony on echocardiogram.     Advanced Therapies (if appropriate): Not appropriate at this time, particularly considering advanced lung disease.        COPD (end stage)  Former smoker; 105 pack years, quit in 2012.  Severe disease,  On home oxygen.  Multiple hospitalizations with acute COPD exacerbations   Follows with pulm  H/O COVID-19  Nonobstructive CAD  Has coronary calcium on CT  On aspirin and  statin  Hyperlipidemia  Lab Results   Component Value Date    LDLCALC 75 08/19/2021     Continue statin  KEVIN   On BiPAP nightly  GOC.    -palliative care discussed. He is not interested     HPI:   Natalio Hartamnn Jr. is a 67-year-old male who presented to [facility] on 2/12/2023 with an acute COPD exacerbation requiring intubation. Extubated 2/15/23.  PMH includes nonischemic cardiomyopathy, chronic systolic and diastolic heart failure, nonobstructive CAD, hyperlipidemia, severe COPD, KEVIN.  Was previously admitted in 8/2022 for acute decompensated heart failure and COPD exacerbation.  LVEF 40% at that time, down from prior of 45 to 50%, though previously EF had been 35% for years.  Started on Lasix at that time.  Has been euvolemic, though blood pressures have made it difficult to start/titrate up on GDMT.  Recently saw Dr. Kevin in the office in December, at which time his Lasix was shifted to as needed and adequate oral intake and hydration were encouraged.     He was reportedly short of breath for 2 days prior to presentation, with hallucinations.  His wife called 911 and EMS intubated him in the field.  He was found to have an elevated procalcitonin on admission and started on azithromycin and ceftriaxone.  He was given IV Lasix with good urine output.  He was weaned off the ventilator and extubated on 2/15.  Maintained on BiPAP overnight and has been receiving scheduled nebulizers with aggressive pulmonary hygiene. TTE on admission with LVEF 20 to 25%, down from 40% in 8/2022.      He is also followed by pulmonology, who recommended consideration for home hospice for severe COPD in January.  He was seen by palliative care while inpatient here, and patient and family elected to make code status level 3 DNR/DNI.  Pulmonology has been following him as well and has been managing steroids, bronchodilators, and nebulizer therapy.     Per TH: 3/7/23. Presents for post hospital follow up with his wife. Just  discharged from [facility]. Feeling at his baseline. Gets SOB with minimal exertion. O2 sats in the low 80's on 3-4 L NC,  on initial assessment today. Has complaints that his feet are red and swollen today. Wants to keep fighting. Dreading another hospital admission.      Diuretics increased after visit with Bren (Lasix increased to BID x 3 days.)     Recently hospitalized from 4/30-5/10/23 for acute respiratory failure in the setting of COPD. He was admitted initially to the ICU and treated with IV steroids, scheduled nebulized breathing treatments, and increased supplemental oxygen.  He also required BiPAP.  Ultimately he was stabilized and transition to the floor.  Goals of care were discussed at length with the patient and his wife.  He is currently a DNR/DNI. Plan per chart review per patient and his wife; plan with next exacerbation is to return to the hospital, but to transition to palliative care and comfort measures at that time. Referred to home hospice as of 5/12/23, wife expressed they would like to speak to palliative on Friday before making a final decision.      5/17/23: presents today for follow up. Feels at his baseline, O2 sats mid to high 90s on home 3L NC. Some LE swelling, improves with elevating his legs. Able to complete ADLs and move around the house, SOB not increased from his baseline. Meeting with palliative care tomorrow. Denies chest pain, palpitations, dizziness, syncope. Feels like he's urinating much more with torsemide than he did with Lasix. Weighs himself daily, small fluctuations but overall stable.      6/1/23: presents today for follow up. In respiratory distress, confused, tachypneic. Wife reports this has been worsening since this morning; no recent trigger, fevers, chills. +LE swelling. Confirmed at today's appointment that patient is currently FULL CODE. EMS called, transported via ambulance to ED, transfer order placed.     6/26/23: presents today for follow up.  Hospitalized at  [facility] from 6/1 to 6/12 for respiratory distress, treated for COPD exacerbation with steroids and breathing treatments, eventually weaned off BiPAP.  Treated with IV Lasix as well.  Reaffirmed full CODE STATUS.  Doing better today, feeling improved since hospitalization.  Appetite has been down, supplementing with Ensure.  Minimal lower extremity swelling, shortness of breath at baseline.  Urinating well with torsemide.  EMS was called on 6/13 by [facility], as patient was hypoxic and short of breath on their arrival.  EMS checked oxygen per wife, reports that was above 92%.  Does report that they noticed elevated blood sugar, so she has been checking his sugars at home and noting numbers in the 200s. No further EMS calls, has been tolerating medications.      Hospitalized 7/19 through 7/21 and again 7/22 through 7/29 for COPD exacerbation.  Hospitalized again at [facility] from 7/31 to 8/3, again for hypoxic respiratory failure in the setting of his home BiPAP not working. Home oxygen at 6L NC at rest and 8L with exertion, along with nightly BiPAP. Has appropriate supplies at home.      8/14/23: presents today for follow up. Breathing at baseline. On 3L home O2 at rest, 6L with exertion. Some swelling in feet and ankles. Urinating well with current dose of torsemide. Drinking Ensure, trying to gain caloric weight. Weight between 92-96 lbs at home. Would like to explore virtual visits when possible going forward.      9/6/23: Presents today for follow up. Being treated for pneumonia by pulm, occasional chills. Taking antibiotics. Increased his torsemide to 40 mg BID x 2 days with increased urination. Has +LE swelling, increased cough from baseline. Unable to fully cough up sputum. No fever. On consistent home O2.      10/6/23: Presents for virtual visit today. Feeling at his baseline. Feels like his appetite is adequate but gets fatigued easily when eating. Some LE swelling, at baseline. Taking torsemide 20 mg BID, urinating well  with this. Breathing at baseline, coughing less since finishing antibiotic course. Feels like he needs to drink more water, reviewed hydration importance with him. Seeing palliative care and pulm next week. No cardiac complaints today.      3/11/2024: Presents today for follow-up.  Saw Dr. Funez in January, who ordered a repeat echocardiogram, which has not yet been done.  Admitted to [facility] from 1/27 to 2/4/2024 for COPD exacerbation.  Admitted to ICU and treated with BiPAP, bronchodilators, steroids, diuretics, and antibiotics.  Weaned down to baseline oxygen requirement prior to discharge.  Palliative care again consulted inpatient, but declined any end-of-life goals of care discussions.     Feeling at his baseline today.  Weights have been stable, minimal lower extremity swelling, breathing at baseline.  Having some trouble with keeping his appetite up, has been drinking about 2 Ensure shakes a day for protein.  Advised small, frequent meals throughout the day as much as tolerated.  Trying to stick to fluid and sodium restriction.  Urinating quite a bit with current torsemide dose.  Notices that when his oxygen level drops, he sees his heart rate increase, which normalizes with rest/oxygenation.  No sensation of palpitations or skipped beats.  Denies dizziness, lightheadedness, syncope.     4/15/24: presents today for follow up. Thinks torsemide alternating dose is working. Thinks he may getting a cold. Coughs with CPAP mask at night, recently calibrated. Using baseline oxygen (between 4-5 L NC). Intermittent foot swelling. Limited appetite. Reiterated small, frequent meals throughout the day and optimizing water intake. Some foot swelling, mild compared to baseline. Wife notices higher heart rates when oxygen levels drop occasionally.     8/13/24 CHELSEA Lance clinically remains about the same from a cardiac standpoint. He has deconditioned and has been significantly affected by his chronic lung disease and  "the inability to do things. He is essentially wheelchair-bound at this point. He shows no signs of volume overload today. No edema. He has chronic shortness of breath which appears no different compared to the last time I saw him. He recently had a CT scan that showed a consolidation/opacity of the left lower lobe and he did go on empiric antibiotics. He is a repeat CT scan tomorrow.     2/20/25 Presents for follow up with his family.  Reports feeling at his baseline.  He is most frustrated with becoming short of breath with simple things such as eating.  Continues to lose weight.  He has some bilateral leg swelling which has been stable.  Denies chest pain, orthopnea or PND.    Presented to the hospital on 5/13/2025 due to increased edema.  He took an extra torsemide at home with no relief.  Patient also endorsed some mild dyspnea.  Patient found to have mild volume overload in the setting of dietary indiscretion and increased salt intake.  Volume overload resolved with Lasix 40 mg IV x 2 doses.  Discussed salt restriction at length with patient.  Arranged for outpatient cardiology follow-up on 5/21.  Patient discharged home in stable condition\"     06/02/2025 with MJ: Patient presents with his spouse and son for hospital follow-up. Feeling okay today. Continues with ongoing fatigue, decreased appetite, and SHARIF. Patient's wife reports worsening lower extremity swelling and abdominal distention since returning home. Patient reports robust urinary response to torsemide. Continues to eat frozen/microwavable breakfast bowls as well as eggs, oatmeal, Ensure shakes, and 4-6 bananas a day.     Admitted to [facility] from 6/15 to 6/19 for acute on chronic respiratory failure with hypoxia and hypercapnia.  Multiple conversations with inpatient team and hospice team about transitioning to hospice, but patient reportedly not ready given that BiPAP is not covered under hospice.  Torsemide and home dose losartan held during admission " due to hypotension and contraction alkalosis.     7/8/2025: Presents today for follow-up.  Noted swelling in his lower extremities worsening over the past several days.  Chronically short of breath due to end-stage COPD, remains on 4 L nasal cannula.  No change.  Torsemide had been held on discharge, so initially had resumed 20 mg daily yesterday due to swelling, but then took an additional 20 mg when instructed to do so over the phone.  Struggling with adequate oral intake at home, drinks protein shakes.    7/17/25 Comes in for one week follow up. Would not get on the scale. Frustrated with frequent doctor's appointments and taking so many medications. Despite this he feels there are no other options for care. He does not want palliative care or hospice. His O2 sats are low today. His appetite is poor.   Past Medical History:   Diagnosis Date    Acute metabolic encephalopathy 03/29/2022    Arthritis     Bladder mass     Cardiomyopathy ([facility])     Chest pain     COPD (chronic obstructive pulmonary disease) ([facility])     COPD exacerbation ([facility]) 04/28/2023    COVID-19 virus infection 02/23/2023    CPAP (continuous positive airway pressure) dependence     Dependence on respirator (ventilator) status ([facility]) 01/10/2024    Emphysema of lung ([facility])     Hypoxia     nocturnal    Left bundle branch block     Macrocytosis without anemia 05/23/2019    Multiple pulmonary nodules     last assessed: 10/12/16    Osteoarthritis 08/03/2013    Pneumonia     Sleep apnea, obstructive     Smoker     Steroid-induced hyperglycemia 03/30/2023    Weight loss 08/29/2019       12 point ROS negative other than that stated in HPI    No Known Allergies  .    Current Outpatient Medications:     albuterol (PROVENTIL HFA,VENTOLIN HFA) 90 mcg/act inhaler, Inhale 2 puffs every 6 (six) hours as needed for wheezing or shortness of breath, Disp: 18 g, Rfl: 11    Aspirin Low Dose 81 MG chewable tablet, CHEW 1 TABLET BY MOUTH DAILY, Disp: 90 tablet, Rfl: 1     budesonide (PULMICORT) 0.5 mg/2 mL nebulizer solution, Take 2 mL (0.5 mg total) by nebulization 2 (two) times a day Rinse mouth after use., Disp: 1080 mL, Rfl: 1    ipratropium-albuterol (DUO-NEB) 0.5-2.5 mg/3 mL nebulizer solution, Take 3 mL by nebulization 4 (four) times a day, Disp: 360 mL, Rfl: 0    [Paused] losartan (COZAAR) 25 mg tablet, TAKE 1/2 TABLET BY MOUTH EVERY DAY, Disp: 45 tablet, Rfl: 1    potassium chloride (Klor-Con M10) 10 mEq tablet, Take 1 tablet (10 mEq total) by mouth daily, Disp: 30 tablet, Rfl: 3    torsemide (DEMADEX) 20 mg tablet, Take 2 tablets (40 mg total) by mouth daily, Disp: 60 tablet, Rfl: 3    Social History     Socioeconomic History    Marital status: /Civil Union     Spouse name: Not on file    Number of children: Not on file    Years of education: Not on file    Highest education level: Not on file   Occupational History    Not on file   Tobacco Use    Smoking status: Former     Current packs/day: 0.00     Average packs/day: 2.5 packs/day for 42.0 years (105.0 ttl pk-yrs)     Types: Cigarettes     Start date:      Quit date:      Years since quittin.5    Smokeless tobacco: Former    Tobacco comments:     1 ppd for 37 years, 2010 down to 5 cigs a day, is around second hand smoke   Vaping Use    Vaping status: Never Used   Substance and Sexual Activity    Alcohol use: Not Currently     Comment: rarely    Drug use: No    Sexual activity: Not Currently     Partners: Female   Other Topics Concern    Not on file   Social History Narrative    Daily coffee consumption: 8 or more cups a day        Used to work in [employer].  On disability.     Social Drivers of Health     Financial Resource Strain: Not on file   Food Insecurity: No Food Insecurity (2025)    Nursing - Inadequate Food Risk Classification     Worried About Running Out of Food in the Last Year: Not on file     Ran Out of Food in the Last Year: Not on file     Ran Out of Food in the Last Year:  "Never true   Transportation Needs: No Transportation Needs (6/19/2025)    Nursing - Transportation Risk Classification     Lack of Transportation: Not on file     Lack of Transportation: No   Physical Activity: Not on file   Stress: Not on file   Social Connections: Not on file   Intimate Partner Violence: Unknown (6/19/2025)    Nursing IPS     Feels Physically and Emotionally Safe: Not on file     Physically Hurt by Someone: Not on file     Humiliated or Emotionally Abused by Someone: Not on file     Physically Hurt by Someone: No     Hurt or Threatened by Someone: No   Housing Stability: Unknown (6/19/2025)    Nursing: Inadequate Housing Risk Classification     Has Housing: Not on file     Worried About Losing Housing: Not on file     Unable to Get Utilities: Not on file     Unable to Pay for Housing in the Last Year: No     Has Housing: No       Family History   Problem Relation Name Age of Onset    Diabetes Mother         Physical Exam:    Vitals: /78 (BP Location: Left arm, Patient Position: Sitting, Cuff Size: Standard)   Pulse 90   Ht 5' 7\" (1.702 m)   SpO2 (!) 86%   BMI 17.10 kg/m²     Wt Readings from Last 3 Encounters:   06/19/25 49.5 kg (109 lb 3.2 oz)   05/13/25 43 kg (94 lb 12.8 oz)   04/04/25 43 kg (94 lb 14.4 oz)         GEN: Natalio Hartmann Jr. appears well, alert and oriented x 3, pleasant and cooperative   HEENT: pupils equal, round, and reactive to light; extraocular muscles intact  NECK: supple, no carotid bruits   HEART: regular rhythm, normal S1 and S2, no murmurs, clicks, gallops or rubs, JVP is elevated  LUNGS:severely diminished, scattered wheezes.  ABDOMEN: normal bowel sounds, soft, no tenderness, no distention  EXTREMITIES: peripheral pulses normal; no clubbing, cyanosis, +2 BLLE edema to lower shin  NEURO: no focal findings   SKIN: normal without suspicious lesions on exposed skin    Labs & Results:  Lab Results   Component Value Date     08/17/2015    SODIUM 138 06/19/2025 "    K 3.9 06/19/2025    CL 89 (L) 06/19/2025    CO2 >45 (H) 06/19/2025    ANIONGAP 6 08/17/2015    AGAP  06/19/2025      Comment:      Unable to calculate    BUN 26 (H) 06/19/2025    CREATININE 0.71 06/19/2025    GLUC 83 06/19/2025    GLUF 81 05/14/2025    CALCIUM 9.0 06/19/2025    AST 20 06/16/2025    ALT 11 06/16/2025    ALKPHOS 57 06/16/2025    PROT 6.8 08/17/2015    TP 6.0 (L) 06/16/2025    BILITOT 0.60 08/17/2015    TBILI 0.59 06/16/2025    EGFR 96 06/19/2025     Lab Results   Component Value Date    WBC 6.73 06/19/2025    HGB 9.6 (L) 06/19/2025    HCT 32.4 (L) 06/19/2025     (H) 06/19/2025     06/19/2025     Lab Results   Component Value Date     (H) 05/13/2025      Lab Results   Component Value Date    LDLCALC 75 08/19/2021     Lab Results   Component Value Date    FCQ4QEYYLQLA 0.534 03/16/2025     Lab Results   Component Value Date    HGBA1C 5.7 (H) 06/18/2025         EKG personally reviewed by DELIA Bowens.   No results found for this visit on 07/17/25.     Counseling / Coordination of Care  Total face to face time spent with patient 20 minutes.  An additional 10 minutes was spent for chart/data review and visit preparation.       Thank you for the opportunity to participate in the care of this patient.    DELIA Bowens

## 2025-07-17 ENCOUNTER — OFFICE VISIT (OUTPATIENT)
Dept: CARDIOLOGY CLINIC | Facility: CLINIC | Age: 70
End: 2025-07-17
Payer: COMMERCIAL

## 2025-07-17 VITALS
BODY MASS INDEX: 17.1 KG/M2 | OXYGEN SATURATION: 86 % | SYSTOLIC BLOOD PRESSURE: 140 MMHG | DIASTOLIC BLOOD PRESSURE: 78 MMHG | HEIGHT: 67 IN | HEART RATE: 90 BPM

## 2025-07-17 DIAGNOSIS — I50.22 CHRONIC HFREF (HEART FAILURE WITH REDUCED EJECTION FRACTION) (HCC): ICD-10-CM

## 2025-07-17 PROCEDURE — 99214 OFFICE O/P EST MOD 30 MIN: CPT | Performed by: NURSE PRACTITIONER

## 2025-07-17 RX ORDER — POTASSIUM CHLORIDE 750 MG/1
TABLET, EXTENDED RELEASE ORAL
Qty: 368 TABLET | Refills: 0 | Status: ON HOLD | OUTPATIENT
Start: 2025-07-17 | End: 2025-07-28

## 2025-07-17 RX ORDER — TORSEMIDE 20 MG/1
TABLET ORAL
Qty: 736 TABLET | Refills: 0 | Status: ON HOLD | OUTPATIENT
Start: 2025-07-17 | End: 2025-07-28

## 2025-07-17 NOTE — PATIENT INSTRUCTIONS
Weigh yourself daily  If you gain 3 lbs in one day or 5 lbs in one week, please call the office at [phone] and ask for a nurse or the heart failure nurse  Keep your sodium intake to <2 grams, (2000 mg) per day, and fluids <2 Liters (2000 ml) per day. This is around 6-7, 8 oz glasses of fluid per day    Increase the Torsemide to 40 mg twice daily for 4 days  Increase the Potassium to 10 meq twice daily for 4 days

## 2025-07-22 ENCOUNTER — TELEPHONE (OUTPATIENT)
Age: 70
End: 2025-07-22

## 2025-07-22 NOTE — TELEPHONE ENCOUNTER
There are no open slots available at [facility], please advise on best dates and time to offer to patient.

## 2025-07-23 ENCOUNTER — TELEPHONE (OUTPATIENT)
Dept: LAB | Facility: HOSPITAL | Age: 70
End: 2025-07-23

## 2025-07-23 ENCOUNTER — NURSE TRIAGE (OUTPATIENT)
Age: 70
End: 2025-07-23

## 2025-07-23 NOTE — TELEPHONE ENCOUNTER
"REASON FOR CONVERSATION: Leg Swelling (feet) and Shortness of Breath    SYMPTOMS: bilat feet swelling, discolored, cool; baseline SOB    OTHER HEALTH INFORMATION: Monday wt 98.2lbs; no VS available.  Torsemide 40mg daily; last OV 7/17, Torsemide increased to 40mg 2x/day for 4 days.  Natalio refuses ED, go for blood work    PROTOCOL DISPOSITION: Discuss With PCP and Callback by Nurse Today (overriding Callback by PCP Today)    CARE ADVICE PROVIDED: will discuss with provider; ED evaluation if worsens (patient refuses); connect Mellisa with HF team, declined at time of call.    PRACTICE FOLLOW-UP: call Mellisa at [phone] with recommendations; set up mobile lab        Reason for Disposition   MILD to MODERATE leg swelling (e.g., ankle swelling; swelling extends to knees) and new-onset or worse than normal (patient's baseline)    Answer Assessment - Initial Assessment Questions  1. MAIN CONCERN OR SYMPTOM: \"What is your main concern right now?\" \"What's the main symptom you're worried about?\" (e.g., breathing difficulty, ankle swelling, weight gain).      Feet swelling, discolored  2. ONSET: \"When did the  feet swelling, discoloration  start?\"      Last 2 days  3. BREATHING DIFFICULTY: \"Are you having any difficulty breathing?\" If Yes, ask: \"How bad is it?\"  (e.g., none, mild, moderate, severe)  \"Is this worse than usual for you?\"      Baseline SOB due to COPD emphysema  4. EDEMA - FOOT-LEG SWELLING: \"Do you have swelling of your ankles, feet or legs?\" If Yes, ask: \"How bad is the swelling?\" (e.g., localized; mild, moderate, severe)      feet  5. WEIGHT - CURRENT: \"What is your weight today?\"      98.2lbs on Monday  6. WEIGHT - TARGET RANGE: \"Do you try to keep your weight in a target (goal) range?\" If Yes, ask: \"What is that range?\"      ~100lbs but losing weight  7. WEIGHT - CHANGE: \"Have you gained (or lost) weight in the past 24 hours? Past week (7 days)?\" If Yes, ask:  \"How much weight?\"      NA  8. OTHER " "SYMPTOMS: \"Do you have any other symptoms?\" (e.g., depression, weakness or fatigue, abdomen bloating, hacky cough)      depression  9. DIURETICS: \"Are you currently taking water pills?\" (e.g., furosemide [Lasix], hydrochlorothiazide [HCTZ], bumetanide [Bumex], metolazone [Zaroxolyn]) If Yes, ask: \"What medicine are you taking, and how often?\"  \"Any recent change in dose?\"       Torsemide 40mg daily  10. O2 SATURATION MONITOR: \"Do you use an oxygen saturation monitor (pulse oximeter) at home?\" If Yes, ask: \"What is your reading (oxygen level) today?\" \"What is your usual oxygen saturation reading?\" (e.g., 95%)        Unable to obtain  11. HEART FAILURE HCP: \"Who treats your heart failure?\"  (e.g., cardiologist or heart specialist, heart failure clinic or center, primary care doctor)        Nguyễn Herzog  12. FLUID and SODIUM RESTRICTIONS: \"Have you been instructed to restrict your daily fluid intake or sodium (salt) intake?\" If Yes, ask: \"What are your daily limits?\"        2L/2G    Protocols used: Heart Failure on Treatment Follow-up Call-Adult-[place]    "

## 2025-07-23 NOTE — TELEPHONE ENCOUNTER
Holland Luque,     Would we be able to schedule this patient with you, or should we schedule with an AP?     Thank you!   Osvaldo

## 2025-07-23 NOTE — TELEPHONE ENCOUNTER
Caller: Mellisa/ wife     Doctor: Dr. Funez     Reason for call: pt's wife called back and stated that mobile lab called but is unable to get to them until 7/29. She thought it would be sooner due to how bad is feet are. She is requesting a andria back when available.     Call back#: [phone]

## 2025-07-23 NOTE — TELEPHONE ENCOUNTER
Spoke with wife and advised on torsemide instructions.  The feet are red / purple in color ( purple color and shiny skin is new).  They can not get compression socks on due to edema.. Advised to elevate, pt refuses. The edema is mostly his feet with crease david at the ankle and then slightly above ankle.    He did eat today, but not drinking much. Wife states she will call 911 if he gets worse even though the pt does not want to go to ER.    BP monitor not working. She needs to purchase a new one but does not want to leave him right now.    She is encouraging fluids, but pt not interested in drinking more.

## 2025-07-25 ENCOUNTER — HOSPITAL ENCOUNTER (INPATIENT)
Facility: HOSPITAL | Age: 70
LOS: 3 days | Discharge: HOME | DRG: 871 | End: 2025-07-28
Attending: EMERGENCY MEDICINE | Admitting: INTERNAL MEDICINE
Payer: COMMERCIAL

## 2025-07-25 ENCOUNTER — APPOINTMENT (EMERGENCY)
Dept: RADIOLOGY | Facility: HOSPITAL | Age: 70
DRG: 871 | End: 2025-07-25
Payer: COMMERCIAL

## 2025-07-25 DIAGNOSIS — A41.9 SEPSIS DUE TO CELLULITIS (HCC): ICD-10-CM

## 2025-07-25 DIAGNOSIS — J96.02 ACUTE RESPIRATORY FAILURE WITH HYPERCAPNIA (HCC): Primary | ICD-10-CM

## 2025-07-25 DIAGNOSIS — L03.115 CELLULITIS OF RIGHT LOWER EXTREMITY: ICD-10-CM

## 2025-07-25 DIAGNOSIS — I50.22 CHRONIC HFREF (HEART FAILURE WITH REDUCED EJECTION FRACTION) (HCC): ICD-10-CM

## 2025-07-25 DIAGNOSIS — L03.90 SEPSIS DUE TO CELLULITIS (HCC): ICD-10-CM

## 2025-07-25 DIAGNOSIS — J44.1 COPD EXACERBATION (HCC): ICD-10-CM

## 2025-07-25 LAB
2HR DELTA HS TROPONIN: 3 NG/L
4HR DELTA HS TROPONIN: -2 NG/L
ALBUMIN SERPL BCG-MCNC: 3.9 G/DL (ref 3.5–5)
ALP SERPL-CCNC: 65 U/L (ref 34–104)
ALT SERPL W P-5'-P-CCNC: 13 U/L (ref 7–52)
AST SERPL W P-5'-P-CCNC: 19 U/L (ref 13–39)
ATRIAL RATE: 81 BPM
ATRIAL RATE: 91 BPM
BASE EX.OXY STD BLDV CALC-SCNC: 35.7 % (ref 60–80)
BASE EX.OXY STD BLDV CALC-SCNC: 60.5 % (ref 60–80)
BASE EXCESS BLDV CALC-SCNC: 19.1 MMOL/L
BASE EXCESS BLDV CALC-SCNC: 22.7 MMOL/L
BASOPHILS # BLD AUTO: 0.01 THOUSANDS/ÂΜL (ref 0–0.1)
BASOPHILS NFR BLD AUTO: 0 % (ref 0–1)
BILIRUB SERPL-MCNC: 0.29 MG/DL (ref 0.2–1)
BNP SERPL-MCNC: 536 PG/ML (ref 0–100)
BUN SERPL-MCNC: 26 MG/DL (ref 5–25)
CALCIUM SERPL-MCNC: 8.8 MG/DL (ref 8.4–10.2)
CARDIAC TROPONIN I PNL SERPL HS: 25 NG/L (ref ?–50)
CARDIAC TROPONIN I PNL SERPL HS: 27 NG/L (ref ?–50)
CARDIAC TROPONIN I PNL SERPL HS: 30 NG/L (ref ?–50)
CHLORIDE SERPL-SCNC: 84 MMOL/L (ref 96–108)
CO2 SERPL-SCNC: >45 MMOL/L (ref 21–32)
CREAT SERPL-MCNC: 0.87 MG/DL (ref 0.6–1.3)
EOSINOPHIL # BLD AUTO: 0.01 THOUSAND/ÂΜL (ref 0–0.61)
EOSINOPHIL NFR BLD AUTO: 0 % (ref 0–6)
ERYTHROCYTE [DISTWIDTH] IN BLOOD BY AUTOMATED COUNT: 12.3 % (ref 11.6–15.1)
FLUAV RNA RESP QL NAA+PROBE: NEGATIVE
FLUBV RNA RESP QL NAA+PROBE: NEGATIVE
GFR SERPL CREATININE-BSD FRML MDRD: 88 ML/MIN/1.73SQ M
GLUCOSE SERPL-MCNC: 228 MG/DL (ref 65–140)
HCO3 BLDV-SCNC: 51 MMOL/L (ref 24–30)
HCO3 BLDV-SCNC: 55.6 MMOL/L (ref 24–30)
HCT VFR BLD AUTO: 34.9 % (ref 36.5–49.3)
HGB BLD-MCNC: 10.3 G/DL (ref 12–17)
IMM GRANULOCYTES # BLD AUTO: 0.01 THOUSAND/UL (ref 0–0.2)
IMM GRANULOCYTES NFR BLD AUTO: 0 % (ref 0–2)
LACTATE SERPL-SCNC: 1.4 MMOL/L (ref 0.5–2)
LYMPHOCYTES # BLD AUTO: 0.38 THOUSANDS/ÂΜL (ref 0.6–4.47)
LYMPHOCYTES NFR BLD AUTO: 6 % (ref 14–44)
MCH RBC QN AUTO: 30.1 PG (ref 26.8–34.3)
MCHC RBC AUTO-ENTMCNC: 29.5 G/DL (ref 31.4–37.4)
MCV RBC AUTO: 102 FL (ref 82–98)
MONOCYTES # BLD AUTO: 0.45 THOUSAND/ÂΜL (ref 0.17–1.22)
MONOCYTES NFR BLD AUTO: 8 % (ref 4–12)
NEUTROPHILS # BLD AUTO: 5.04 THOUSANDS/ÂΜL (ref 1.85–7.62)
NEUTS SEG NFR BLD AUTO: 86 % (ref 43–75)
NRBC BLD AUTO-RTO: 0 /100 WBCS
O2 CT BLDV-SCNC: 5.8 ML/DL
O2 CT BLDV-SCNC: 9.5 ML/DL
P AXIS: 79 DEGREES
P AXIS: 84 DEGREES
PCO2 BLDV: 119 MM HG (ref 42–50)
PCO2 BLDV: 129.9 MM HG (ref 42–50)
PH BLDV: 7.25 [PH] (ref 7.3–7.4)
PH BLDV: 7.25 [PH] (ref 7.3–7.4)
PLATELET # BLD AUTO: 278 THOUSANDS/UL (ref 149–390)
PMV BLD AUTO: 9.5 FL (ref 8.9–12.7)
PO2 BLDV: 24.9 MM HG (ref 35–45)
PO2 BLDV: 37.6 MM HG (ref 35–45)
POTASSIUM SERPL-SCNC: 3.8 MMOL/L (ref 3.5–5.3)
PR INTERVAL: 154 MS
PR INTERVAL: 168 MS
PROT SERPL-MCNC: 6.3 G/DL (ref 6.4–8.4)
QRS AXIS: 67 DEGREES
QRS AXIS: 92 DEGREES
QRSD INTERVAL: 130 MS
QRSD INTERVAL: 136 MS
QT INTERVAL: 386 MS
QT INTERVAL: 408 MS
QTC INTERVAL: 473 MS
QTC INTERVAL: 475 MS
RBC # BLD AUTO: 3.42 MILLION/UL (ref 3.88–5.62)
RSV RNA RESP QL NAA+PROBE: NEGATIVE
SARS-COV-2 RNA RESP QL NAA+PROBE: NEGATIVE
SODIUM SERPL-SCNC: 137 MMOL/L (ref 135–147)
T WAVE AXIS: -64 DEGREES
T WAVE AXIS: 89 DEGREES
VENTRICULAR RATE: 81 BPM
VENTRICULAR RATE: 91 BPM
WBC # BLD AUTO: 5.9 THOUSAND/UL (ref 4.31–10.16)

## 2025-07-25 PROCEDURE — 94644 CONT INHLJ TX 1ST HOUR: CPT

## 2025-07-25 PROCEDURE — 99223 1ST HOSP IP/OBS HIGH 75: CPT | Performed by: INTERNAL MEDICINE

## 2025-07-25 PROCEDURE — 83880 ASSAY OF NATRIURETIC PEPTIDE: CPT

## 2025-07-25 PROCEDURE — 93010 ELECTROCARDIOGRAM REPORT: CPT | Performed by: INTERNAL MEDICINE

## 2025-07-25 PROCEDURE — 83605 ASSAY OF LACTIC ACID: CPT

## 2025-07-25 PROCEDURE — 94640 AIRWAY INHALATION TREATMENT: CPT

## 2025-07-25 PROCEDURE — 96375 TX/PRO/DX INJ NEW DRUG ADDON: CPT

## 2025-07-25 PROCEDURE — 85025 COMPLETE CBC W/AUTO DIFF WBC: CPT

## 2025-07-25 PROCEDURE — 94760 N-INVAS EAR/PLS OXIMETRY 1: CPT

## 2025-07-25 PROCEDURE — 87040 BLOOD CULTURE FOR BACTERIA: CPT

## 2025-07-25 PROCEDURE — 99285 EMERGENCY DEPT VISIT HI MDM: CPT

## 2025-07-25 PROCEDURE — 99291 CRITICAL CARE FIRST HOUR: CPT | Performed by: EMERGENCY MEDICINE

## 2025-07-25 PROCEDURE — 82805 BLOOD GASES W/O2 SATURATION: CPT

## 2025-07-25 PROCEDURE — 84484 ASSAY OF TROPONIN QUANT: CPT

## 2025-07-25 PROCEDURE — 96365 THER/PROPH/DIAG IV INF INIT: CPT

## 2025-07-25 PROCEDURE — 93005 ELECTROCARDIOGRAM TRACING: CPT

## 2025-07-25 PROCEDURE — 82805 BLOOD GASES W/O2 SATURATION: CPT | Performed by: INTERNAL MEDICINE

## 2025-07-25 PROCEDURE — 80053 COMPREHEN METABOLIC PANEL: CPT

## 2025-07-25 PROCEDURE — 71045 X-RAY EXAM CHEST 1 VIEW: CPT

## 2025-07-25 PROCEDURE — 94002 VENT MGMT INPAT INIT DAY: CPT

## 2025-07-25 PROCEDURE — 36415 COLL VENOUS BLD VENIPUNCTURE: CPT

## 2025-07-25 PROCEDURE — 87637 SARSCOV2&INF A&B&RSV AMP PRB: CPT | Performed by: PSYCHIATRY & NEUROLOGY

## 2025-07-25 RX ORDER — HEPARIN SODIUM 5000 [USP'U]/ML
5000 INJECTION, SOLUTION INTRAVENOUS; SUBCUTANEOUS EVERY 8 HOURS SCHEDULED
Status: DISCONTINUED | OUTPATIENT
Start: 2025-07-25 | End: 2025-07-28 | Stop reason: HOSPADM

## 2025-07-25 RX ORDER — ALBUTEROL SULFATE 5 MG/ML
10 SOLUTION RESPIRATORY (INHALATION) ONCE
Status: COMPLETED | OUTPATIENT
Start: 2025-07-25 | End: 2025-07-25

## 2025-07-25 RX ORDER — CEFAZOLIN SODIUM 1 G/50ML
1000 SOLUTION INTRAVENOUS EVERY 8 HOURS
Status: DISCONTINUED | OUTPATIENT
Start: 2025-07-26 | End: 2025-07-28 | Stop reason: HOSPADM

## 2025-07-25 RX ORDER — ALBUTEROL SULFATE 0.83 MG/ML
2.5 SOLUTION RESPIRATORY (INHALATION) EVERY 4 HOURS PRN
Status: DISCONTINUED | OUTPATIENT
Start: 2025-07-25 | End: 2025-07-28 | Stop reason: HOSPADM

## 2025-07-25 RX ORDER — ONDANSETRON 2 MG/ML
4 INJECTION INTRAMUSCULAR; INTRAVENOUS EVERY 6 HOURS PRN
Status: DISCONTINUED | OUTPATIENT
Start: 2025-07-25 | End: 2025-07-28 | Stop reason: HOSPADM

## 2025-07-25 RX ORDER — SODIUM CHLORIDE, SODIUM GLUCONATE, SODIUM ACETATE, POTASSIUM CHLORIDE, MAGNESIUM CHLORIDE, SODIUM PHOSPHATE, DIBASIC, AND POTASSIUM PHOSPHATE .53; .5; .37; .037; .03; .012; .00082 G/100ML; G/100ML; G/100ML; G/100ML; G/100ML; G/100ML; G/100ML
500 INJECTION, SOLUTION INTRAVENOUS ONCE
Status: COMPLETED | OUTPATIENT
Start: 2025-07-25 | End: 2025-07-25

## 2025-07-25 RX ORDER — BUDESONIDE 0.5 MG/2ML
0.5 INHALANT ORAL 2 TIMES DAILY
Status: DISCONTINUED | OUTPATIENT
Start: 2025-07-25 | End: 2025-07-26

## 2025-07-25 RX ORDER — LEVALBUTEROL INHALATION SOLUTION 1.25 MG/3ML
1.25 SOLUTION RESPIRATORY (INHALATION) EVERY 6 HOURS PRN
Status: DISCONTINUED | OUTPATIENT
Start: 2025-07-25 | End: 2025-07-26

## 2025-07-25 RX ORDER — FORMOTEROL FUMARATE 20 UG/2ML
20 SOLUTION RESPIRATORY (INHALATION)
Status: DISCONTINUED | OUTPATIENT
Start: 2025-07-25 | End: 2025-07-28 | Stop reason: HOSPADM

## 2025-07-25 RX ORDER — SODIUM CHLORIDE FOR INHALATION 0.9 %
3 VIAL, NEBULIZER (ML) INHALATION EVERY 6 HOURS PRN
Status: DISCONTINUED | OUTPATIENT
Start: 2025-07-25 | End: 2025-07-26

## 2025-07-25 RX ORDER — METHYLPREDNISOLONE SODIUM SUCCINATE 125 MG/2ML
100 INJECTION, POWDER, LYOPHILIZED, FOR SOLUTION INTRAMUSCULAR; INTRAVENOUS ONCE
Status: COMPLETED | OUTPATIENT
Start: 2025-07-25 | End: 2025-07-25

## 2025-07-25 RX ORDER — SODIUM CHLORIDE FOR INHALATION 0.9 %
12 VIAL, NEBULIZER (ML) INHALATION ONCE
Status: COMPLETED | OUTPATIENT
Start: 2025-07-25 | End: 2025-07-25

## 2025-07-25 RX ORDER — METHYLPREDNISOLONE SODIUM SUCCINATE 40 MG/ML
40 INJECTION, POWDER, LYOPHILIZED, FOR SOLUTION INTRAMUSCULAR; INTRAVENOUS EVERY 8 HOURS SCHEDULED
Status: DISCONTINUED | OUTPATIENT
Start: 2025-07-26 | End: 2025-07-27

## 2025-07-25 RX ADMIN — IPRATROPIUM BROMIDE 1 MG: 0.5 SOLUTION RESPIRATORY (INHALATION) at 16:06

## 2025-07-25 RX ADMIN — METHYLPREDNISOLONE SODIUM SUCCINATE 100 MG: 125 INJECTION, POWDER, FOR SOLUTION INTRAMUSCULAR; INTRAVENOUS at 16:01

## 2025-07-25 RX ADMIN — VANCOMYCIN HYDROCHLORIDE 750 MG: 1 INJECTION, POWDER, LYOPHILIZED, FOR SOLUTION INTRAVENOUS at 23:20

## 2025-07-25 RX ADMIN — SODIUM CHLORIDE, SODIUM GLUCONATE, SODIUM ACETATE, POTASSIUM CHLORIDE, MAGNESIUM CHLORIDE, SODIUM PHOSPHATE, DIBASIC, AND POTASSIUM PHOSPHATE 500 ML: .53; .5; .37; .037; .03; .012; .00082 INJECTION, SOLUTION INTRAVENOUS at 16:01

## 2025-07-25 RX ADMIN — ALBUTEROL SULFATE 10 MG: 2.5 SOLUTION RESPIRATORY (INHALATION) at 16:06

## 2025-07-25 RX ADMIN — CEFTRIAXONE SODIUM 1000 MG: 10 INJECTION, POWDER, FOR SOLUTION INTRAVENOUS at 22:28

## 2025-07-25 RX ADMIN — ISODIUM CHLORIDE 12 ML: 0.03 SOLUTION RESPIRATORY (INHALATION) at 16:06

## 2025-07-25 RX ADMIN — LEVALBUTEROL HYDROCHLORIDE 1.25 MG: 1.25 SOLUTION RESPIRATORY (INHALATION) at 22:39

## 2025-07-25 RX ADMIN — IPRATROPIUM BROMIDE 0.5 MG: 0.5 SOLUTION RESPIRATORY (INHALATION) at 22:39

## 2025-07-25 RX ADMIN — BUDESONIDE 0.5 MG: 0.5 INHALANT RESPIRATORY (INHALATION) at 22:39

## 2025-07-26 ENCOUNTER — APPOINTMENT (INPATIENT)
Dept: NON INVASIVE DIAGNOSTICS | Facility: HOSPITAL | Age: 70
DRG: 871 | End: 2025-07-26
Payer: COMMERCIAL

## 2025-07-26 LAB
ALBUMIN SERPL BCG-MCNC: 3.4 G/DL (ref 3.5–5)
ALP SERPL-CCNC: 50 U/L (ref 34–104)
ALT SERPL W P-5'-P-CCNC: 11 U/L (ref 7–52)
AST SERPL W P-5'-P-CCNC: 17 U/L (ref 13–39)
BASE EX.OXY STD BLDV CALC-SCNC: 91.3 % (ref 60–80)
BASE EXCESS BLDV CALC-SCNC: 20.2 MMOL/L
BILIRUB SERPL-MCNC: 0.32 MG/DL (ref 0.2–1)
BUN SERPL-MCNC: 26 MG/DL (ref 5–25)
CALCIUM ALBUM COR SERPL-MCNC: 9.1 MG/DL (ref 8.3–10.1)
CALCIUM SERPL-MCNC: 8.6 MG/DL (ref 8.4–10.2)
CHLORIDE SERPL-SCNC: 88 MMOL/L (ref 96–108)
CO2 SERPL-SCNC: >45 MMOL/L (ref 21–32)
CREAT SERPL-MCNC: 0.71 MG/DL (ref 0.6–1.3)
ERYTHROCYTE [DISTWIDTH] IN BLOOD BY AUTOMATED COUNT: 12.4 % (ref 11.6–15.1)
GFR SERPL CREATININE-BSD FRML MDRD: 96 ML/MIN/1.73SQ M
GLUCOSE SERPL-MCNC: 108 MG/DL (ref 65–140)
HCO3 BLDV-SCNC: 49.3 MMOL/L (ref 24–30)
HCT VFR BLD AUTO: 30.6 % (ref 36.5–49.3)
HGB BLD-MCNC: 9 G/DL (ref 12–17)
MCH RBC QN AUTO: 29.5 PG (ref 26.8–34.3)
MCHC RBC AUTO-ENTMCNC: 29.4 G/DL (ref 31.4–37.4)
MCV RBC AUTO: 100 FL (ref 82–98)
O2 CT BLDV-SCNC: 14.1 ML/DL
PCO2 BLDV: 86.2 MM HG (ref 42–50)
PH BLDV: 7.38 [PH] (ref 7.3–7.4)
PLATELET # BLD AUTO: 253 THOUSANDS/UL (ref 149–390)
PMV BLD AUTO: 9.8 FL (ref 8.9–12.7)
PO2 BLDV: 71.7 MM HG (ref 35–45)
POTASSIUM SERPL-SCNC: 3.7 MMOL/L (ref 3.5–5.3)
PROCALCITONIN SERPL-MCNC: <0.05 NG/ML
PROT SERPL-MCNC: 5.7 G/DL (ref 6.4–8.4)
RBC # BLD AUTO: 3.05 MILLION/UL (ref 3.88–5.62)
SODIUM SERPL-SCNC: 140 MMOL/L (ref 135–147)
WBC # BLD AUTO: 3.44 THOUSAND/UL (ref 4.31–10.16)

## 2025-07-26 PROCEDURE — 99233 SBSQ HOSP IP/OBS HIGH 50: CPT | Performed by: INTERNAL MEDICINE

## 2025-07-26 PROCEDURE — 84145 PROCALCITONIN (PCT): CPT | Performed by: INTERNAL MEDICINE

## 2025-07-26 PROCEDURE — 94760 N-INVAS EAR/PLS OXIMETRY 1: CPT

## 2025-07-26 PROCEDURE — 94003 VENT MGMT INPAT SUBQ DAY: CPT

## 2025-07-26 PROCEDURE — 85027 COMPLETE CBC AUTOMATED: CPT | Performed by: INTERNAL MEDICINE

## 2025-07-26 PROCEDURE — 93971 EXTREMITY STUDY: CPT | Performed by: SURGERY

## 2025-07-26 PROCEDURE — 99223 1ST HOSP IP/OBS HIGH 75: CPT | Performed by: INTERNAL MEDICINE

## 2025-07-26 PROCEDURE — 93971 EXTREMITY STUDY: CPT

## 2025-07-26 PROCEDURE — 94640 AIRWAY INHALATION TREATMENT: CPT

## 2025-07-26 PROCEDURE — 80053 COMPREHEN METABOLIC PANEL: CPT | Performed by: INTERNAL MEDICINE

## 2025-07-26 RX ORDER — POTASSIUM CHLORIDE 1500 MG/1
40 TABLET, EXTENDED RELEASE ORAL ONCE
Status: COMPLETED | OUTPATIENT
Start: 2025-07-26 | End: 2025-07-26

## 2025-07-26 RX ORDER — LEVALBUTEROL INHALATION SOLUTION 1.25 MG/3ML
1.25 SOLUTION RESPIRATORY (INHALATION)
Status: DISCONTINUED | OUTPATIENT
Start: 2025-07-26 | End: 2025-07-28 | Stop reason: HOSPADM

## 2025-07-26 RX ORDER — TORSEMIDE 20 MG/1
40 TABLET ORAL DAILY
Status: DISCONTINUED | OUTPATIENT
Start: 2025-07-26 | End: 2025-07-28 | Stop reason: HOSPADM

## 2025-07-26 RX ORDER — BUDESONIDE 0.5 MG/2ML
0.5 INHALANT ORAL
Status: DISCONTINUED | OUTPATIENT
Start: 2025-07-26 | End: 2025-07-28 | Stop reason: HOSPADM

## 2025-07-26 RX ADMIN — IPRATROPIUM BROMIDE 0.5 MG: 0.5 SOLUTION RESPIRATORY (INHALATION) at 08:53

## 2025-07-26 RX ADMIN — AZITHROMYCIN MONOHYDRATE 500 MG: 500 INJECTION, POWDER, LYOPHILIZED, FOR SOLUTION INTRAVENOUS at 02:05

## 2025-07-26 RX ADMIN — LEVALBUTEROL HYDROCHLORIDE 1.25 MG: 1.25 SOLUTION RESPIRATORY (INHALATION) at 14:59

## 2025-07-26 RX ADMIN — CEFAZOLIN SODIUM 1000 MG: 1 SOLUTION INTRAVENOUS at 15:21

## 2025-07-26 RX ADMIN — IPRATROPIUM BROMIDE 0.5 MG: 0.5 SOLUTION RESPIRATORY (INHALATION) at 14:59

## 2025-07-26 RX ADMIN — HEPARIN SODIUM 5000 UNITS: 5000 INJECTION, SOLUTION INTRAVENOUS; SUBCUTANEOUS at 15:25

## 2025-07-26 RX ADMIN — LEVALBUTEROL HYDROCHLORIDE 1.25 MG: 1.25 SOLUTION RESPIRATORY (INHALATION) at 08:53

## 2025-07-26 RX ADMIN — METHYLPREDNISOLONE SODIUM SUCCINATE 40 MG: 40 INJECTION, POWDER, FOR SOLUTION INTRAMUSCULAR; INTRAVENOUS at 20:14

## 2025-07-26 RX ADMIN — LEVALBUTEROL HYDROCHLORIDE 1.25 MG: 1.25 SOLUTION RESPIRATORY (INHALATION) at 19:18

## 2025-07-26 RX ADMIN — FORMOTEROL FUMARATE DIHYDRATE 20 MCG: 20 SOLUTION RESPIRATORY (INHALATION) at 19:18

## 2025-07-26 RX ADMIN — IPRATROPIUM BROMIDE 0.5 MG: 0.5 SOLUTION RESPIRATORY (INHALATION) at 19:18

## 2025-07-26 RX ADMIN — BUDESONIDE 0.5 MG: 0.5 INHALANT RESPIRATORY (INHALATION) at 19:18

## 2025-07-26 RX ADMIN — METHYLPREDNISOLONE SODIUM SUCCINATE 40 MG: 40 INJECTION, POWDER, FOR SOLUTION INTRAMUSCULAR; INTRAVENOUS at 15:25

## 2025-07-26 RX ADMIN — CEFAZOLIN SODIUM 1000 MG: 1 SOLUTION INTRAVENOUS at 05:28

## 2025-07-26 RX ADMIN — HEPARIN SODIUM 5000 UNITS: 5000 INJECTION, SOLUTION INTRAVENOUS; SUBCUTANEOUS at 20:15

## 2025-07-26 RX ADMIN — CEFAZOLIN SODIUM 1000 MG: 1 SOLUTION INTRAVENOUS at 20:14

## 2025-07-26 RX ADMIN — AZITHROMYCIN MONOHYDRATE 500 MG: 500 INJECTION, POWDER, LYOPHILIZED, FOR SOLUTION INTRAVENOUS at 21:52

## 2025-07-26 RX ADMIN — BUDESONIDE 0.5 MG: 0.5 INHALANT RESPIRATORY (INHALATION) at 08:53

## 2025-07-26 RX ADMIN — METHYLPREDNISOLONE SODIUM SUCCINATE 40 MG: 40 INJECTION, POWDER, FOR SOLUTION INTRAMUSCULAR; INTRAVENOUS at 05:29

## 2025-07-26 RX ADMIN — POTASSIUM CHLORIDE 40 MEQ: 1500 TABLET, EXTENDED RELEASE ORAL at 15:19

## 2025-07-27 LAB
BUN SERPL-MCNC: 25 MG/DL (ref 5–25)
CALCIUM SERPL-MCNC: 8.7 MG/DL (ref 8.4–10.2)
CHLORIDE SERPL-SCNC: 88 MMOL/L (ref 96–108)
CO2 SERPL-SCNC: >45 MMOL/L (ref 21–32)
CREAT SERPL-MCNC: 0.73 MG/DL (ref 0.6–1.3)
ERYTHROCYTE [DISTWIDTH] IN BLOOD BY AUTOMATED COUNT: 12.4 % (ref 11.6–15.1)
GFR SERPL CREATININE-BSD FRML MDRD: 95 ML/MIN/1.73SQ M
GLUCOSE SERPL-MCNC: 129 MG/DL (ref 65–140)
HCT VFR BLD AUTO: 30.2 % (ref 36.5–49.3)
HGB BLD-MCNC: 8.9 G/DL (ref 12–17)
MCH RBC QN AUTO: 29.3 PG (ref 26.8–34.3)
MCHC RBC AUTO-ENTMCNC: 29.5 G/DL (ref 31.4–37.4)
MCV RBC AUTO: 99 FL (ref 82–98)
PLATELET # BLD AUTO: 264 THOUSANDS/UL (ref 149–390)
PMV BLD AUTO: 9.7 FL (ref 8.9–12.7)
POTASSIUM SERPL-SCNC: 3.9 MMOL/L (ref 3.5–5.3)
RBC # BLD AUTO: 3.04 MILLION/UL (ref 3.88–5.62)
SODIUM SERPL-SCNC: 135 MMOL/L (ref 135–147)
WBC # BLD AUTO: 5.05 THOUSAND/UL (ref 4.31–10.16)

## 2025-07-27 PROCEDURE — 94760 N-INVAS EAR/PLS OXIMETRY 1: CPT

## 2025-07-27 PROCEDURE — 94003 VENT MGMT INPAT SUBQ DAY: CPT

## 2025-07-27 PROCEDURE — 99232 SBSQ HOSP IP/OBS MODERATE 35: CPT | Performed by: INTERNAL MEDICINE

## 2025-07-27 PROCEDURE — 80048 BASIC METABOLIC PNL TOTAL CA: CPT | Performed by: INTERNAL MEDICINE

## 2025-07-27 PROCEDURE — 94640 AIRWAY INHALATION TREATMENT: CPT

## 2025-07-27 PROCEDURE — 94660 CPAP INITIATION&MGMT: CPT

## 2025-07-27 PROCEDURE — 85027 COMPLETE CBC AUTOMATED: CPT | Performed by: INTERNAL MEDICINE

## 2025-07-27 RX ORDER — AZITHROMYCIN 250 MG/1
250 TABLET, FILM COATED ORAL EVERY OTHER DAY
Status: DISCONTINUED | OUTPATIENT
Start: 2025-07-29 | End: 2025-07-28 | Stop reason: HOSPADM

## 2025-07-27 RX ORDER — AZITHROMYCIN 250 MG/1
500 TABLET, FILM COATED ORAL EVERY OTHER DAY
Status: DISCONTINUED | OUTPATIENT
Start: 2025-07-29 | End: 2025-07-27

## 2025-07-27 RX ORDER — POTASSIUM CHLORIDE 1500 MG/1
20 TABLET, EXTENDED RELEASE ORAL ONCE
Status: COMPLETED | OUTPATIENT
Start: 2025-07-27 | End: 2025-07-27

## 2025-07-27 RX ORDER — METHYLPREDNISOLONE SODIUM SUCCINATE 40 MG/ML
40 INJECTION, POWDER, LYOPHILIZED, FOR SOLUTION INTRAMUSCULAR; INTRAVENOUS EVERY 12 HOURS SCHEDULED
Status: COMPLETED | OUTPATIENT
Start: 2025-07-27 | End: 2025-07-27

## 2025-07-27 RX ORDER — METHYLPREDNISOLONE SODIUM SUCCINATE 40 MG/ML
40 INJECTION, POWDER, LYOPHILIZED, FOR SOLUTION INTRAMUSCULAR; INTRAVENOUS EVERY 12 HOURS SCHEDULED
Status: DISCONTINUED | OUTPATIENT
Start: 2025-07-27 | End: 2025-07-27

## 2025-07-27 RX ADMIN — HEPARIN SODIUM 5000 UNITS: 5000 INJECTION, SOLUTION INTRAVENOUS; SUBCUTANEOUS at 21:28

## 2025-07-27 RX ADMIN — LEVALBUTEROL HYDROCHLORIDE 1.25 MG: 1.25 SOLUTION RESPIRATORY (INHALATION) at 18:55

## 2025-07-27 RX ADMIN — CEFAZOLIN SODIUM 1000 MG: 1 SOLUTION INTRAVENOUS at 21:28

## 2025-07-27 RX ADMIN — CEFAZOLIN SODIUM 1000 MG: 1 SOLUTION INTRAVENOUS at 14:16

## 2025-07-27 RX ADMIN — FORMOTEROL FUMARATE DIHYDRATE 20 MCG: 20 SOLUTION RESPIRATORY (INHALATION) at 18:54

## 2025-07-27 RX ADMIN — IPRATROPIUM BROMIDE 0.5 MG: 0.5 SOLUTION RESPIRATORY (INHALATION) at 13:57

## 2025-07-27 RX ADMIN — IPRATROPIUM BROMIDE 0.5 MG: 0.5 SOLUTION RESPIRATORY (INHALATION) at 18:55

## 2025-07-27 RX ADMIN — HEPARIN SODIUM 5000 UNITS: 5000 INJECTION, SOLUTION INTRAVENOUS; SUBCUTANEOUS at 05:20

## 2025-07-27 RX ADMIN — LEVALBUTEROL HYDROCHLORIDE 1.25 MG: 1.25 SOLUTION RESPIRATORY (INHALATION) at 09:07

## 2025-07-27 RX ADMIN — HEPARIN SODIUM 5000 UNITS: 5000 INJECTION, SOLUTION INTRAVENOUS; SUBCUTANEOUS at 14:15

## 2025-07-27 RX ADMIN — METHYLPREDNISOLONE SODIUM SUCCINATE 40 MG: 40 INJECTION, POWDER, FOR SOLUTION INTRAMUSCULAR; INTRAVENOUS at 05:20

## 2025-07-27 RX ADMIN — LEVALBUTEROL HYDROCHLORIDE 1.25 MG: 1.25 SOLUTION RESPIRATORY (INHALATION) at 13:57

## 2025-07-27 RX ADMIN — AZITHROMYCIN MONOHYDRATE 500 MG: 500 INJECTION, POWDER, LYOPHILIZED, FOR SOLUTION INTRAVENOUS at 22:20

## 2025-07-27 RX ADMIN — ALBUTEROL SULFATE 2.5 MG: 2.5 SOLUTION RESPIRATORY (INHALATION) at 18:42

## 2025-07-27 RX ADMIN — ALBUTEROL SULFATE 2.5 MG: 2.5 SOLUTION RESPIRATORY (INHALATION) at 12:38

## 2025-07-27 RX ADMIN — POTASSIUM CHLORIDE 20 MEQ: 1500 TABLET, EXTENDED RELEASE ORAL at 09:34

## 2025-07-27 RX ADMIN — METHYLPREDNISOLONE SODIUM SUCCINATE 40 MG: 40 INJECTION, POWDER, FOR SOLUTION INTRAMUSCULAR; INTRAVENOUS at 21:28

## 2025-07-27 RX ADMIN — BUDESONIDE 0.5 MG: 0.5 INHALANT RESPIRATORY (INHALATION) at 18:55

## 2025-07-27 RX ADMIN — CEFAZOLIN SODIUM 1000 MG: 1 SOLUTION INTRAVENOUS at 05:20

## 2025-07-27 RX ADMIN — TORSEMIDE 40 MG: 20 TABLET ORAL at 09:35

## 2025-07-27 RX ADMIN — IPRATROPIUM BROMIDE 0.5 MG: 0.5 SOLUTION RESPIRATORY (INHALATION) at 09:07

## 2025-07-27 RX ADMIN — BUDESONIDE 0.5 MG: 0.5 INHALANT RESPIRATORY (INHALATION) at 09:07

## 2025-07-28 VITALS
SYSTOLIC BLOOD PRESSURE: 105 MMHG | RESPIRATION RATE: 18 BRPM | OXYGEN SATURATION: 96 % | HEART RATE: 81 BPM | TEMPERATURE: 98.4 F | DIASTOLIC BLOOD PRESSURE: 67 MMHG

## 2025-07-28 PROCEDURE — 94640 AIRWAY INHALATION TREATMENT: CPT

## 2025-07-28 PROCEDURE — 99232 SBSQ HOSP IP/OBS MODERATE 35: CPT | Performed by: INTERNAL MEDICINE

## 2025-07-28 PROCEDURE — 94664 DEMO&/EVAL PT USE INHALER: CPT

## 2025-07-28 PROCEDURE — 94003 VENT MGMT INPAT SUBQ DAY: CPT

## 2025-07-28 PROCEDURE — NC001 PR NO CHARGE: Performed by: INTERNAL MEDICINE

## 2025-07-28 PROCEDURE — 94660 CPAP INITIATION&MGMT: CPT

## 2025-07-28 PROCEDURE — 94760 N-INVAS EAR/PLS OXIMETRY 1: CPT

## 2025-07-28 PROCEDURE — 99239 HOSP IP/OBS DSCHRG MGMT >30: CPT | Performed by: INTERNAL MEDICINE

## 2025-07-28 RX ORDER — POTASSIUM CHLORIDE 750 MG/1
10 TABLET, EXTENDED RELEASE ORAL DAILY
Start: 2025-07-28 | End: 2025-08-04 | Stop reason: SDUPTHER

## 2025-07-28 RX ORDER — PREDNISONE 10 MG/1
TABLET ORAL
Qty: 60 TABLET | Refills: 0 | Status: SHIPPED | OUTPATIENT
Start: 2025-07-28 | End: 2025-08-27

## 2025-07-28 RX ORDER — CEPHALEXIN 500 MG/1
500 CAPSULE ORAL EVERY 6 HOURS SCHEDULED
Qty: 16 CAPSULE | Refills: 0 | Status: SHIPPED | OUTPATIENT
Start: 2025-07-28 | End: 2025-08-01

## 2025-07-28 RX ORDER — TORSEMIDE 20 MG/1
40 TABLET ORAL DAILY
Start: 2025-07-28 | End: 2025-08-04 | Stop reason: SDUPTHER

## 2025-07-28 RX ADMIN — FORMOTEROL FUMARATE DIHYDRATE 20 MCG: 20 SOLUTION RESPIRATORY (INHALATION) at 19:44

## 2025-07-28 RX ADMIN — BUDESONIDE 0.5 MG: 0.5 INHALANT RESPIRATORY (INHALATION) at 07:45

## 2025-07-28 RX ADMIN — LEVALBUTEROL HYDROCHLORIDE 1.25 MG: 1.25 SOLUTION RESPIRATORY (INHALATION) at 19:44

## 2025-07-28 RX ADMIN — IPRATROPIUM BROMIDE 0.5 MG: 0.5 SOLUTION RESPIRATORY (INHALATION) at 19:44

## 2025-07-28 RX ADMIN — PREDNISONE 50 MG: 20 TABLET ORAL at 10:47

## 2025-07-28 RX ADMIN — LEVALBUTEROL HYDROCHLORIDE 1.25 MG: 1.25 SOLUTION RESPIRATORY (INHALATION) at 14:53

## 2025-07-28 RX ADMIN — HEPARIN SODIUM 5000 UNITS: 5000 INJECTION, SOLUTION INTRAVENOUS; SUBCUTANEOUS at 15:14

## 2025-07-28 RX ADMIN — FORMOTEROL FUMARATE DIHYDRATE 20 MCG: 20 SOLUTION RESPIRATORY (INHALATION) at 07:45

## 2025-07-28 RX ADMIN — LEVALBUTEROL HYDROCHLORIDE 1.25 MG: 1.25 SOLUTION RESPIRATORY (INHALATION) at 07:45

## 2025-07-28 RX ADMIN — CEFAZOLIN SODIUM 1000 MG: 1 SOLUTION INTRAVENOUS at 05:08

## 2025-07-28 RX ADMIN — HEPARIN SODIUM 5000 UNITS: 5000 INJECTION, SOLUTION INTRAVENOUS; SUBCUTANEOUS at 05:08

## 2025-07-28 RX ADMIN — IPRATROPIUM BROMIDE 0.5 MG: 0.5 SOLUTION RESPIRATORY (INHALATION) at 07:45

## 2025-07-28 RX ADMIN — CEFAZOLIN SODIUM 1000 MG: 1 SOLUTION INTRAVENOUS at 15:13

## 2025-07-28 RX ADMIN — IPRATROPIUM BROMIDE 0.5 MG: 0.5 SOLUTION RESPIRATORY (INHALATION) at 14:53

## 2025-07-28 RX ADMIN — TORSEMIDE 40 MG: 20 TABLET ORAL at 10:47

## 2025-07-28 RX ADMIN — ALBUTEROL SULFATE 2.5 MG: 2.5 SOLUTION RESPIRATORY (INHALATION) at 12:10

## 2025-07-28 RX ADMIN — BUDESONIDE 0.5 MG: 0.5 INHALANT RESPIRATORY (INHALATION) at 19:44

## 2025-07-29 ENCOUNTER — TELEPHONE (OUTPATIENT)
Dept: LAB | Facility: HOSPITAL | Age: 70
End: 2025-07-29

## 2025-07-29 ENCOUNTER — TELEPHONE (OUTPATIENT)
Age: 70
End: 2025-07-29

## 2025-07-29 ENCOUNTER — TRANSITIONAL CARE MANAGEMENT (OUTPATIENT)
Age: 70
End: 2025-07-29

## 2025-07-30 ENCOUNTER — TELEPHONE (OUTPATIENT)
Age: 70
End: 2025-07-30

## 2025-07-30 LAB
BACTERIA BLD CULT: NORMAL
BACTERIA BLD CULT: NORMAL

## 2025-08-04 ENCOUNTER — OFFICE VISIT (OUTPATIENT)
Dept: CARDIOLOGY CLINIC | Facility: CLINIC | Age: 70
End: 2025-08-04
Payer: COMMERCIAL

## 2025-08-04 VITALS
OXYGEN SATURATION: 94 % | SYSTOLIC BLOOD PRESSURE: 100 MMHG | HEART RATE: 103 BPM | BODY MASS INDEX: 15.85 KG/M2 | HEIGHT: 67 IN | WEIGHT: 101 LBS | DIASTOLIC BLOOD PRESSURE: 64 MMHG

## 2025-08-04 DIAGNOSIS — I50.22 CHRONIC HFREF (HEART FAILURE WITH REDUCED EJECTION FRACTION) (HCC): ICD-10-CM

## 2025-08-04 PROCEDURE — 99214 OFFICE O/P EST MOD 30 MIN: CPT | Performed by: NURSE PRACTITIONER

## 2025-08-04 RX ORDER — POTASSIUM CHLORIDE 750 MG/1
10 TABLET, EXTENDED RELEASE ORAL 2 TIMES DAILY
Qty: 60 TABLET | Refills: 3 | Status: SHIPPED | OUTPATIENT
Start: 2025-08-04

## 2025-08-04 RX ORDER — TORSEMIDE 20 MG/1
40 TABLET ORAL 2 TIMES DAILY
Qty: 60 TABLET | Refills: 3 | Status: SHIPPED | OUTPATIENT
Start: 2025-08-04

## (undated) DEVICE — CYSTO TUBING SINGLE IRRIGATION

## (undated) DEVICE — BASIC SINGLE BASIN 2-LF: Brand: MEDLINE INDUSTRIES, INC.

## (undated) DEVICE — CATH FOLEY 18FR 5ML 2 WAY SILICONE ELASTIMER

## (undated) DEVICE — STERILE CYSTO PACK: Brand: CARDINAL HEALTH

## (undated) DEVICE — PAD GROUNDING ADULT

## (undated) DEVICE — CHEMOTHERAPY CONTAINER,HINGED LID, YELLOW: Brand: SHARPSAFETY

## (undated) DEVICE — PACK TUR

## (undated) DEVICE — CATHETER ADAPTER: Brand: ADDTO

## (undated) DEVICE — TUBING SUCTION 5MM X 12 FT

## (undated) DEVICE — EVACUATOR BLADDER ELLIK DISP STRL

## (undated) DEVICE — SPECIMEN CONTAINER STERILE PEEL PACK

## (undated) DEVICE — Device: Brand: OLYMPUS

## (undated) DEVICE — LUBRICANT SURGILUBE TUBE 4 OZ  FLIP TOP

## (undated) DEVICE — GLOVE SRG BIOGEL ORTHOPEDIC 7.5

## (undated) DEVICE — BAG URINE DRAINAGE 4000ML CONTINUOUS IRR

## (undated) DEVICE — CATHETER PLUG WITH CAP: Brand: DOVER